# Patient Record
Sex: FEMALE | Race: WHITE | Employment: OTHER | URBAN - METROPOLITAN AREA
[De-identification: names, ages, dates, MRNs, and addresses within clinical notes are randomized per-mention and may not be internally consistent; named-entity substitution may affect disease eponyms.]

---

## 2017-01-12 ENCOUNTER — OFFICE VISIT (OUTPATIENT)
Dept: DERMATOLOGY | Facility: CLINIC | Age: 62
End: 2017-01-12
Payer: COMMERCIAL

## 2017-01-12 VITALS — SYSTOLIC BLOOD PRESSURE: 137 MMHG | HEIGHT: 69 IN | HEART RATE: 77 BPM | DIASTOLIC BLOOD PRESSURE: 80 MMHG

## 2017-01-12 DIAGNOSIS — L82.1 SK (SEBORRHEIC KERATOSIS): ICD-10-CM

## 2017-01-12 DIAGNOSIS — L81.4 LENTIGO: ICD-10-CM

## 2017-01-12 DIAGNOSIS — C44.719 BASAL CELL CARCINOMA OF LEFT LOWER EXTREMITY: Primary | ICD-10-CM

## 2017-01-12 DIAGNOSIS — Z85.828 HISTORY OF SKIN CANCER: ICD-10-CM

## 2017-01-12 PROCEDURE — 17313 MOHS 1 STAGE T/A/L: CPT | Performed by: DERMATOLOGY

## 2017-01-12 PROCEDURE — 11100 ZZHC BIOPSY SKIN/SUBQ/MUC MEM, SINGLE LESION: CPT | Mod: 59 | Performed by: DERMATOLOGY

## 2017-01-12 PROCEDURE — 99213 OFFICE O/P EST LOW 20 MIN: CPT | Mod: 25 | Performed by: DERMATOLOGY

## 2017-01-12 PROCEDURE — 88331 PATH CONSLTJ SURG 1 BLK 1SPC: CPT | Mod: 59 | Performed by: DERMATOLOGY

## 2017-01-12 NOTE — MR AVS SNAPSHOT
After Visit Summary   1/12/2017    Jolynn Haji    MRN: [id number]           Patient Information     Date Of Birth          1955        Visit Information        Provider Department      1/12/2017 1:00 PM Sheldon Rogers MD [facility]        Care Instructions          Wound Care Instructions     FOR SUPERFICIAL WOUNDS     [facility] [phone]    [facility] [phone]                       AFTER 24 HOURS YOU SHOULD REMOVE THE BANDAGE AND BEGIN DAILY DRESSING CHANGES AS FOLLOWS:     1) Remove Dressing.     2) Clean and dry the area with tap water using a Q-tip or sterile gauze pad.     3) Apply Vaseline, Aquaphor, Polysporin ointment or Bacitracin ointment over entire wound.  Do NOT use Neosporin ointment.     4) Cover the wound with a band-aid, or a sterile non-stick gauze pad and micropore paper tape      REPEAT THESE INSTRUCTIONS AT LEAST ONCE A DAY UNTIL THE WOUND HAS COMPLETELY HEALED.    It is an old wives tale that a wound heals better when it is exposed to air and allowed to dry out. The wound will heal faster with a better cosmetic result if it is kept moist with ointment and covered with a bandage.    **Do not let the wound dry out.**      Supplies Needed:      *Cotton tipped applicators (Q-tips)    *Polysporin Ointment or Bacitracin Ointment (NOT NEOSPORIN)    *Band-aids or non-stick gauze pads and micropore paper tape.      PATIENT INFORMATION:    During the healing process you will notice a number of changes. All wounds develop a small halo of redness surrounding the wound.  This means healing is occurring. Severe itching with extensive redness usually indicates sensitivity to the ointment or bandage tape used to dress the wound.  You should call our office if this develops.      Swelling  and/or discoloration around your surgical site is common, particularly when performed around the eye.    All wounds normally drain.  The  larger the wound the more drainage there will be.  After 7-10 days, you will notice the wound beginning to shrink and new skin will begin to grow.  The wound is healed when you can see skin has formed over the entire area.  A healed wound has a healthy, shiny look to the surface and is red to dark pink in color to normalize.  Wounds may take approximately 4-6 weeks to heal.  Larger wounds may take 6-8 weeks.  After the wound is healed you may discontinue dressing changes.    You may experience a sensation of tightness as your wound heals. This is normal and will gradually subside.    Your healed wound may be sensitive to temperature changes. This sensitivity improves with time, but if you re having a lot of discomfort, try to avoid temperature extremes.    Patients frequently experience itching after their wound appears to have healed because of the continue healing under the skin.  Plain Vaseline will help relieve the itching.        POSSIBLE COMPLICATIONS    BLEEDIN. Leave the bandage in place.  2. Use tightly rolled up gauze or a cloth to apply direct pressure over the bandage for 30  minutes.  3. Reapply pressure for an additional 30 minutes if necessary  4. Use additional gauze and tape to maintain pressure once the bleeding has stopped.  5.         Follow-ups after your visit        Who to contact     If you have questions or need follow up information about today's clinic visit or your schedule please contact [facility] directly at [phone].  Normal or non-critical lab and imaging results will be communicated to you by [employer], letter or phone within 4 business days after the clinic has received the results. If you do not hear from us within 7 days, please contact the clinic through [employer] or phone. If you have a critical or abnormal lab result, we will notify you by phone as soon as possible.  Submit refill requests through [employer] [place] or call your pharmacy and they will  "forward the refill request to us. Please allow 3 business days for your refill to be completed.          Additional Information About Your Visit        MyChart Information     [employer] lets you send messages to your doctor, view your test results, renew your prescriptions, schedule appointments and more. To sign up, go to [email or web address] . Click on \"Log in\" on the left side of the screen, which will take you to the Welcome page. Then click on \"Sign up Now\" on the right side of the page.     You will be asked to enter the access code listed below, as well as some personal information. Please follow the directions to create your username and password.     Your access code is: [email or web address]  Expires: 2017  1:20 PM     Your access code will  in 90 days. If you need help or a new code, please call your [place] clinic or [phone].        Care EveryWhere ID     This is your Care EveryWhere ID. This could be used by other organizations to access your [place] medical records  [id number]        Your Vitals Were     Pulse Height Last Period             77 1.753 m (5' 9\") 2001          Blood Pressure from Last 3 Encounters:   17 137/80   16 128/79   16 120/80    Weight from Last 3 Encounters:   16 74.299 kg (163 lb 12.8 oz)   06/11/15 71.668 kg (158 lb)   14 70.931 kg (156 lb 6 oz)              Today, you had the following     No orders found for display       Primary Care Provider Office Phone # Fax #    Helga Ibarra [phone]       [address] [address]        Thank you!     Thank you for choosing [facility]  for your care. Our goal is always to provide you with excellent care. Hearing back from our patients is one way we can continue to improve our services. Please take a few minutes to complete the written survey that you may receive in the mail after your visit " with us. Thank you!             Your Updated Medication List - Protect others around you: Learn how to safely use, store and throw away your medicines at www.disposemymeds.org.          This list is accurate as of: 1/12/17  1:20 PM.  Always use your most recent med list.                   Brand Name Dispense Instructions for use    albuterol 108 (90 BASE) MCG/ACT Inhaler    PROAIR HFA/PROVENTIL HFA/VENTOLIN HFA    1 Inhaler    Inhale 2 puffs into the lungs every 6 hours as needed for shortness of breath / dyspnea       fluticasone 110 MCG/ACT Inhaler    FLOVENT HFA    1 Inhaler    Inhale 2 puffs into the lungs 2 times daily 2 puffs       hydroquinone 4 % Crea     30 g    Apply to face q hs       lisinopril 2.5 MG tablet    PRINIVIL/Zestril    90 tablet    TAKE ONE TABLET BY MOUTH EVERY DAY       loratadine 10 MG capsule     30 capsule    Take 10 mg by mouth daily       metroNIDAZOLE 0.75 % cream    METROCREAM    45 g    APPLY TOPICALLY TWO TIMES A DAY TO FACE

## 2017-01-12 NOTE — PATIENT INSTRUCTIONS
Wound Care Instructions     FOR SUPERFICIAL WOUNDS     [facility] [phone]    [facility] [phone]                       AFTER 24 HOURS YOU SHOULD REMOVE THE BANDAGE AND BEGIN DAILY DRESSING CHANGES AS FOLLOWS:     1) Remove Dressing.     2) Clean and dry the area with tap water using a Q-tip or sterile gauze pad.     3) Apply Vaseline, Aquaphor, Polysporin ointment or Bacitracin ointment over entire wound.  Do NOT use Neosporin ointment.     4) Cover the wound with a band-aid, or a sterile non-stick gauze pad and micropore paper tape      REPEAT THESE INSTRUCTIONS AT LEAST ONCE A DAY UNTIL THE WOUND HAS COMPLETELY HEALED.    It is an old wives tale that a wound heals better when it is exposed to air and allowed to dry out. The wound will heal faster with a better cosmetic result if it is kept moist with ointment and covered with a bandage.    **Do not let the wound dry out.**      Supplies Needed:      *Cotton tipped applicators (Q-tips)    *Polysporin Ointment or Bacitracin Ointment (NOT NEOSPORIN)    *Band-aids or non-stick gauze pads and micropore paper tape.      PATIENT INFORMATION:    During the healing process you will notice a number of changes. All wounds develop a small halo of redness surrounding the wound.  This means healing is occurring. Severe itching with extensive redness usually indicates sensitivity to the ointment or bandage tape used to dress the wound.  You should call our office if this develops.      Swelling  and/or discoloration around your surgical site is common, particularly when performed around the eye.    All wounds normally drain.  The larger the wound the more drainage there will be.  After 7-10 days, you will notice the wound beginning to shrink and new skin will begin to grow.  The wound is healed when you can see skin has formed over the entire area.  A healed wound has a healthy, shiny look to the surface and is red to dark pink in color  to normalize.  Wounds may take approximately 4-6 weeks to heal.  Larger wounds may take 6-8 weeks.  After the wound is healed you may discontinue dressing changes.    You may experience a sensation of tightness as your wound heals. This is normal and will gradually subside.    Your healed wound may be sensitive to temperature changes. This sensitivity improves with time, but if you re having a lot of discomfort, try to avoid temperature extremes.    Patients frequently experience itching after their wound appears to have healed because of the continue healing under the skin.  Plain Vaseline will help relieve the itching.        POSSIBLE COMPLICATIONS    BLEEDIN. Leave the bandage in place.  2. Use tightly rolled up gauze or a cloth to apply direct pressure over the bandage for 30  minutes.  3. Reapply pressure for an additional 30 minutes if necessary  4. Use additional gauze and tape to maintain pressure once the bleeding has stopped.  5.

## 2017-01-12 NOTE — NURSING NOTE
"Initial /80 mmHg  Pulse 77  Ht 1.753 m (5' 9\")  LMP 06/28/2001 Estimated body mass index is 24.18 kg/(m^2) as calculated from the following:    Height as of this encounter: 1.753 m (5' 9\").    Weight as of 4/21/16: 74.299 kg (163 lb 12.8 oz). .      "

## 2017-01-12 NOTE — PROGRESS NOTES
Jolynn Haji is a 61 year old year old female patient here today for f/u hx of non-melanoma skin cancer.  She notes a bleeding spot on left shin.   .  Patient states this has been present for ?.  Patient reports the following symptoms:  bleeding.  Patient reports the following previous treatments none.  Patient reports the following modifying factors none.  Associated symptoms: none.  Patient has no other skin complaints today.  Remainder of the HPI, Meds, PMH, Allergies, FH, and SH was reviewed in chart.    Pertinent Hx:   Non-melanoma skin cancer    Past Medical History   Diagnosis Date     Abnormal glandular Papanicolaou smear of cervix      Hidradenitis      left  groin     Synovial cyst of popliteal space      Hypertension goal BP (blood pressure) < 140/90      Postmenopausal since 2008      OA (osteoarthritis) of knee      moderate     Shingles 2010     left      HSIL (high grade squamous intraepithelial lesion) on Pap smear of cervix 2000     Normal paps from  to /     Abnormal glandular Papanicolaou smear of cervix- ASCUS in 2003     ASC H-      Basal cell carcinoma of leg, right 2016     Basal cell carcinoma        Past Surgical History   Procedure Laterality Date     C nonspecific procedure       Colposcopy        Family History   Problem Relation Age of Onset     Thyroid Disease Mother      Graves disease     HEART DISEASE Father      MV problem with CHF  at 62     CANCER Maternal Uncle      type ??     CANCER Maternal Uncle      liver     DIABETES Daughter      Hypertension Mother      Hypertension Father      DIABETES Mother      type 2     HEART DISEASE Father 62      from MI     Cardiovascular Father      Blood Disease Mother       from leukemia at age 78       Social History     Social History     Marital Status:      Spouse Name: Zach     Number of Children: 2     Years of Education: 16     Occupational History     teacher - first  grade - retired       [employer]     Social History Main Topics     Smoking status: Never Smoker      Smokeless tobacco: Never Used     Alcohol Use: 12.0 oz/week      Comment: rarely      Drug Use: No      Comment: no herbal meds either     Sexual Activity:     Partners: Male     Birth Control/ Protection: Surgical      Comment: -was on DepoProvera since 2000-2/2008 -  had vasectomy     Other Topics Concern      Service No     Blood Transfusions No     Caffeine Concern No     Occupational Exposure No     Hobby Hazards No     Sleep Concern No     Stress Concern No     Weight Concern No     Special Diet No     Back Care No     Exercise Yes     regular      Bike Helmet No     Seat Belt Yes     always     Self-Exams Yes     SBE  encouraged monthly     Parent/Sibling W/ Cabg, Mi Or Angioplasty Before 65f 55m? No     Social History Narrative    calcium - 3 +dairy servings/day and/or calcium supplement daily     flex sig/colonoscopy -referred 11/05 - was postponed by [facility] - will pt another referral  3/14/06, didn't go - referred again 6/27/07 = normal.     sun precautions - discussed    mammogram - yearly     Td booster -1995, 2/14/05     pneumovax -at age 59-65    DEXA -normal 6/2005- recheck this year 2008 - referral given 2/2008    stool hemoccults - every year after age 40    ASA- start 81mg asa qday    mulvitamin - encouraged    Last menses about 7/08.         no hot flashes- 2005 - was on Depoprovera 2000- 2/2008        Outpatient Encounter Prescriptions as of 1/12/2017   Medication Sig Dispense Refill     lisinopril (PRINIVIL,ZESTRIL) 2.5 MG tablet TAKE ONE TABLET BY MOUTH EVERY DAY 90 tablet 1     hydroquinone 4 % CREA Apply to face q hs 30 g 0     loratadine 10 MG capsule Take 10 mg by mouth daily 30 capsule      albuterol (PROAIR HFA, PROVENTIL HFA, VENTOLIN HFA) 108 (90 BASE) MCG/ACT inhaler Inhale 2 puffs into the lungs every 6 hours as  "needed for shortness of breath / dyspnea 1 Inhaler 5     fluticasone (FLOVENT HFA) 110 MCG/ACT inhaler Inhale 2 puffs into the lungs 2 times daily 2 puffs 1 Inhaler 12     metroNIDAZOLE (METROCREAM) 0.75 % cream APPLY TOPICALLY TWO TIMES A DAY TO FACE 45 g 5     No facility-administered encounter medications on file as of 1/12/2017.             Review Of Systems  Skin: As above  Eyes: negative  Ears/Nose/Throat: negative  Respiratory: No shortness of breath, dyspnea on exertion, cough, or hemoptysis  Cardiovascular: negative  Gastrointestinal: negative  Genitourinary: negative  Musculoskeletal: negative  Neurologic: negative  Psychiatric: negative  Hematologic/Lymphatic/Immunologic: negative  Endocrine: negative      O:   NAD, WDWN, Alert & Oriented, Mood & Affect wnl, Vitals stable   Here today alone   /80 mmHg  Pulse 77  Ht 1.753 m (5' 9\")  LMP 06/28/2001   General appearance normal   Vitals stable   Alert, oriented and in no acute distress      Following lymph nodes palpated: Occipital, Cervical, Supraclavicular no lad   Stuck on papules and brown macules on trunk and ext    L shin with 1cm pink pearly papule         The remainder of the full exam was unremarkable; the following areas were examined:  conjunctiva/lids, oral mucosa, neck, peripheral vascular system, abdomen, lymph nodes, digits/nails, eccrine and apocrine glands, scalp/hair, face, neck, chest, abdomen, buttocks, back, RUE, LUE, RLE, LLE       Eyes: Conjunctivae/lids:Normal     ENT: Lips, buccal mucosa, tongue: normal    MSK:Normal    Cardiovascular: peripheral edema none    Pulm: Breathing Normal    Lymph Nodes: No Head and Neck Lymphadenopathy     Neuro/Psych: Orientation:Normal; Mood/Affect:Normal      MICRO:   L shin:Orthokeratosis of epidermis with a proliferation of nests of basaloid cells, with peripheral palisading and a haphazard arrangement in the center extending into the dermis, forming nodules.  The tumor cells have " hyperchromatic nuclei. Poor cytoplasm and intercellular bridging.    A/P:  1. L shin r/o basal cell carcinoma   TANGENTIAL BIOPSY IN HOUSE:  After consent, anesthesia with LEC and prep, tangential excision performed and dx above confirmed with frozen section histology.  No complications and routine wound care.  Patient told result basal cell carcinoma     2. Seborrheic keratosis, lentigo, hx of basal cell carcinoma   .      BENIGN LESIONS DISCUSSED WITH PATIENT:  I discussed the specifics of tumor, prognosis, and genetics of benign lesions.  I explained that treatment of these lesions would be purely cosmetic and not medically neccessary.  I discussed with patient different removal options including excision, cautery and /or laser.      Nature and genetics of benign skin lesions dicussed with patient.  Signs and Symptoms of skin cancer discussed with patient.  Patient encouraged to perform monthly skin exams.  UV precautions reviewed with patient.  Skin care regimen reviewed with patient: Eliminate harsh soaps, i.e. Dial, zest, irsih spring; Mild soaps such as Cetaphil or Dove sensitive skin, avoid hot or cold showers, aggressive use of emollients including vanicream, cetaphil or cerave discussed with patient.    Risks of non-melanoma skin cancer discussed with patient   Return to clinic 6 months      PROCEDURE NOTe  L shin basal cell carcinoma   MOHS:   Location    After PGACAC discussed with patient, decision for Mohs surgery was made. Indication for Mohs was Location. Patient confirmed the site with Dr. Rogers.  After anesthesia with LEC, the tumor was excised using standard Mohs technique in 1 stages(s).  CLEAR MARGINS OBTAINED and Final defect size was 1.3 cm.       REPAIR SECOND INTENT: We discussed the options for wound management in full with the patient including risks/benefits/possible outcomes. Decision made to allow the wound to heal by second intention. EBL minimal; complications none; wound care  routine.  The patient was discharged in good condition and will return in one month or prn for wound evaluation.

## 2017-03-09 ENCOUNTER — OFFICE VISIT (OUTPATIENT)
Dept: FAMILY MEDICINE | Facility: CLINIC | Age: 62
End: 2017-03-09
Payer: COMMERCIAL

## 2017-03-09 VITALS
HEART RATE: 79 BPM | TEMPERATURE: 98.1 F | SYSTOLIC BLOOD PRESSURE: 132 MMHG | HEIGHT: 69 IN | BODY MASS INDEX: 24.26 KG/M2 | DIASTOLIC BLOOD PRESSURE: 80 MMHG | WEIGHT: 163.8 LBS | OXYGEN SATURATION: 99 %

## 2017-03-09 DIAGNOSIS — J20.9 ACUTE BRONCHITIS, UNSPECIFIED ORGANISM: ICD-10-CM

## 2017-03-09 DIAGNOSIS — J01.01 ACUTE RECURRENT MAXILLARY SINUSITIS: Primary | ICD-10-CM

## 2017-03-09 DIAGNOSIS — I10 ESSENTIAL HYPERTENSION WITH GOAL BLOOD PRESSURE LESS THAN 140/90: ICD-10-CM

## 2017-03-09 DIAGNOSIS — H66.001 ACUTE SUPPURATIVE OTITIS MEDIA OF RIGHT EAR WITHOUT SPONTANEOUS RUPTURE OF TYMPANIC MEMBRANE, RECURRENCE NOT SPECIFIED: ICD-10-CM

## 2017-03-09 PROCEDURE — 99214 OFFICE O/P EST MOD 30 MIN: CPT | Performed by: FAMILY MEDICINE

## 2017-03-09 RX ORDER — LISINOPRIL 2.5 MG/1
2.5 TABLET ORAL DAILY
Qty: 90 TABLET | Refills: 1 | Status: CANCELLED | OUTPATIENT
Start: 2017-03-09

## 2017-03-09 RX ORDER — CEFPROZIL 500 MG/1
500 TABLET, FILM COATED ORAL 2 TIMES DAILY
Qty: 20 TABLET | Refills: 0 | Status: SHIPPED | OUTPATIENT
Start: 2017-03-09 | End: 2017-03-22

## 2017-03-09 RX ORDER — LISINOPRIL 5 MG/1
5 TABLET ORAL DAILY
Qty: 30 TABLET | Refills: 1 | Status: SHIPPED | OUTPATIENT
Start: 2017-03-09 | End: 2017-05-12

## 2017-03-09 RX ORDER — FLUTICASONE PROPIONATE 50 MCG
1-2 SPRAY, SUSPENSION (ML) NASAL DAILY
Qty: 1 BOTTLE | Refills: 11 | Status: SHIPPED | OUTPATIENT
Start: 2017-03-09 | End: 2019-12-11

## 2017-03-09 NOTE — NURSING NOTE
"Chief Complaint   Patient presents with     URI       Initial /86 (BP Location: Right arm, Patient Position: Chair, Cuff Size: Adult Regular)  Pulse 79  Temp 98.1  F (36.7  C) (Oral)  Ht 5' 9\" (1.753 m)  Wt 163 lb 12.8 oz (74.3 kg)  LMP 06/28/2001  SpO2 99%  BMI 24.19 kg/m2 Estimated body mass index is 24.19 kg/(m^2) as calculated from the following:    Height as of this encounter: 5' 9\" (1.753 m).    Weight as of this encounter: 163 lb 12.8 oz (74.3 kg).  Medication Reconciliation: complete   Liane Escobedo CMA    "

## 2017-03-09 NOTE — PROGRESS NOTES
SUBJECTIVE:                                                    Jolynn Haji is a 61 year old female who presents to clinic today for the following health issues:    Acute Illness   Acute illness concerns: URI - started with bad cold - stuffy nose, then cough.   Onset: x3 weeks    Fever: no    Chills/Sweats: YES- chills    Headache (location?): YES- frontal    Sinus Pressure:YES- tender and facial pain    Conjunctivitis:  no    Ear Pain: YES: right - pressure and pain- started yesterday.     Rhinorrhea: YES- green    Congestion: YES- chest and head    Sore Throat: YES- when sx first started      Cough: YES-productive of yellow sputum, productive of green sputum    Wheeze: no    Decreased Appetite: no    Nausea: no    Vomiting: no    Diarrhea:  no    Dysuria/Freq.: no    Fatigue/Achiness: YES- both    Sick/Strep Exposure: no     Therapies Tried and outcome: Mucinex - minimal relief, Robtiussin DM - helps with sleep, Tylenol, and Advil - helps with ear pain.        Problem list and histories reviewed & adjusted, as indicated.  Additional history: as documented    Reviewed and updated as needed this visit by clinical staff  Tobacco  Allergies  Meds  Med Hx  Surg Hx  Fam Hx  Soc Hx      Reviewed and updated as needed this visit by Provider       Patient Active Problem List   Diagnosis     Hypertension goal BP (blood pressure) < 140/90     Hidradenitis     CELIAC DISEASE- family hx of      Localized osteoarthritis of hand     Synovial cyst of popliteal space     DERMATOPHYTOSIS OF NAIL- toenails      Lipidosis     Osteopenia     Postmenopausal     NUMBNESS AND TINGLING OF FOOT - bilateral -mild      CARDIOVASCULAR SCREENING; LDL GOAL LESS THAN 160     Numbness of feet- distal feet R>L      Advanced directives, counseling/discussion     Stress incontinence, female - mild     Cough     Acute bronchitis- recurrent- ? related to mold in school building- pt has since retired and no further bronchitis     Rosacea      "History of migraine headaches- assoc. with nausea/vomiting - resolved around menopause - were  premenstrual      Dupuytren's disease of palm - right 4th digit flexor tendon     Sun-damaged skin     Seasonal allergic rhinitis     Basal cell carcinoma of leg, right       Current Outpatient Prescriptions   Medication Sig Dispense Refill     lisinopril (PRINIVIL,ZESTRIL) 2.5 MG tablet TAKE ONE TABLET BY MOUTH EVERY DAY 90 tablet 1     loratadine 10 MG capsule Take 10 mg by mouth daily 30 capsule      albuterol (PROAIR HFA, PROVENTIL HFA, VENTOLIN HFA) 108 (90 BASE) MCG/ACT inhaler Inhale 2 puffs into the lungs every 6 hours as needed for shortness of breath / dyspnea 1 Inhaler 5     fluticasone (FLOVENT HFA) 110 MCG/ACT inhaler Inhale 2 puffs into the lungs 2 times daily 2 puffs 1 Inhaler 12     metroNIDAZOLE (METROCREAM) 0.75 % cream APPLY TOPICALLY TWO TIMES A DAY TO FACE 45 g 5          Allergies   Allergen Reactions     Augmentin      itching, rash     Fosamax Diarrhea     Diarrhea, stomach cramps, jaw pain           ROS:   ROS: 12 point ROS neg other than the symptoms noted above.  Has noticed bp up recently.   BP Readings from Last 6 Encounters:   03/09/17 150/86   01/12/17 137/80   07/14/16 128/79   04/21/16 120/80   02/08/16 118/72   11/05/15 108/64   Pt hasn't been taking  Any decongestants in the last 2 weeks at all .       OBJECTIVE:                                                    /80  Pulse 79  Temp 98.1  F (36.7  C) (Oral)  Ht 5' 9\" (1.753 m)  Wt 163 lb 12.8 oz (74.3 kg)  LMP 06/28/2001  SpO2 99%  BMI 24.19 kg/m2  Body mass index is 24.19 kg/(m^2).   Initial bp = 152/86.   GENERAL: healthy, alert, well nourished, well hydrated, no distress  HENT: ear canals- normal; left TM is- normal; the right TM is dull, red, and bulging; Nose-congested; with bilateral maxillary sinus tenderness to palpation;  Mouth- no ulcers, no lesions  NECK: no tenderness, no adenopathy, no asymmetry, no masses, no " stiffness; thyroid- normal to palpation  RESP: lungs clear to auscultation - no rales, no rhonchi, no wheezes,  with deep breathing, but with cough has coarse moist rhonchi and wheezes  at the mid posterior fields that radiate throughout the lungs and don't clear with continued coughing.     CV: regular rates and rhythm, normal S1 S2, no S3 or S4 and no murmur, no click or rub -  ABDOMEN: soft, no tenderness, no  hepatosplenomegaly, no masses, normal bowel sounds  MS: extremities- no gross deformities noted, no edema    Diagnostic test results:  none      ASSESSMENT/PLAN:                                                        ICD-10-CM    1. Acute recurrent maxillary sinusitis J01.01 cefPROZIL (CEFZIL) 500 MG tablet     fluticasone (FLONASE) 50 MCG/ACT spray   2. Acute bronchitis, unspecified organism J20.9 cefPROZIL (CEFZIL) 500 MG tablet   3. Essential hypertension with goal blood pressure less than 140/90 - increased from previous- not controlled I10 lisinopril (PRINIVIL/ZESTRIL) 5 MG tablet   4. Acute suppurative otitis media of right ear without spontaneous rupture of tympanic membrane, recurrence not specified H66.001 antipyrine-benzocaine (AURODEX) 54-14 MG/ML SOLN otic solution     Will increase lisinopril to 5mg daily - take at night. Recheck bp with me again in 1month.   Please, call or return to clinic or go to the ER immediately if signs or symptoms worsen or fail to improve as anticipated.    See Patient Instructions.          Helga Ibarra MD    [facility] ([facility]]- [place]

## 2017-03-09 NOTE — PATIENT INSTRUCTIONS
Increase your lisinopril to 5mg daily and recheck bp with me next month as scheduled.                     Sinusitis           What is sinusitis?   Sinusitis is swollen, infected linings of the sinuses. The sinuses are hollow spaces in the bones of your face and skull. They connect with the nose through small openings. Like the nose, their linings make mucus.   How does it occur?   Sinusitis occurs when the sinus linings become infected. The passageways from the sinuses to the nose are very narrow. Swelling and mucus may block the passageways. This leads to pressure changes in the sinuses that can be painful.   A number of things can cause swelling and sinusitis. Most often it's allergens (things that cause allergies, like pollen and mold) and viruses, such as viruses that cause the common cold. Whether the cause is allergies or a virus, the sinus linings can swell. When swelling causes the sinus passageway to swell shut, bacteria, viruses, and even fungus can be trapped in the sinuses and cause a sinus infection.   If your nasal bones have been injured or are deformed, causing partial blockage of the sinus openings, you are more likely to get sinusitis.   What are the symptoms?   Symptoms include:   feeling of fullness or pressure in your head   a headache that is most painful when you first wake up in the morning or when you bend your head down or forward   pain above or below your eyes   aching in the upper jaw and teeth   runny or stuffy nose   cough, especially at night   fluid draining down the back of your throat (postnasal drainage)   sore throat in the morning or evening.   How is it diagnosed?   Your healthcare provider will ask about your symptoms and will examine you. You may have an X-ray to look for swelling, fluid, or small benign growths (polyps) in the sinuses.   How is it treated?   Decongestants may help. They may be nonprescription or prescription. They are available as liquids, pills, and nose  sprays.   Your healthcare provider may prescribe an antibiotic. In some cases you may need to take decongestants and antibiotics for several weeks.   You may need nonprescription medicine for pain, such as acetaminophen or ibuprofen. Check with your healthcare provider before you give any medicine that contains aspirin or salicylates to a child or teen. This includes medicines like baby aspirin, some cold medicines, and Pepto Bismol. Children and teens who take aspirin are at risk for a serious illness called Reye's syndrome. Ibuprofen is an NSAID. Nonsteroidal anti-inflammatory medicines (NSAIDs) may cause stomach bleeding and other problems. These risks increase with age. Read the label and take as directed. Unless recommended by your healthcare provider, do not take NSAIDs for more than 10 days for any reason.   If you have chronic or repeated sinus infections, allergies may be the cause. Your healthcare provider may prescribe antihistamine tablets or prescription nasal sprays (steroids or cromolyn) to treat the allergies.   If you have chronic, severe sinusitis that does not respond to treatment with medicines, surgery may be done. The surgeon can create an extra or enlarged passageway in the wall of the sinus cavity. This allows the sinuses to drain more easily through the nasal passages. This should help them stay free of infection.   How long will the effects last?   Symptoms may get better gradually over 3 to 10 days. Depending on what caused the sinusitis and how severe it is, it may last for days or weeks. The symptoms may come back if you do not finish all of your antibiotic.   How can I take care of myself?   Follow your healthcare provider's instructions.   If you are taking an antibiotic, take all of it as directed by your provider. If you stop taking the medicine when your symptoms are gone but before you have taken all of the medicine, symptoms may come back.   Avoid tobacco smoke.   If you have  allergies, take care to avoid the things you are allergic to, such as animal dander.   Add moisture to the air with a humidifier or a vaporizer, unless you have mold allergy (mold may grow in your vaporizer).   Inhale steam from a basin of hot water or shower to help open your sinuses and relieve pain.   Use saline nasal sprays to help wash out nasal passages and clear some mucus from the airways.   Use decongestants as directed on the label or by your provider.   If you are using a nonprescription nasal-spray decongestant, generally you should not use it for more than 3 days. After 3 days it may cause your symptoms to get worse. Ask your healthcare provider if it is OK for you to use a nasal spray decongestant longer than this.   Get plenty of rest.   Drink more fluids to keep the mucus as thin as possible so your sinuses can drain more easily.   Put warm compresses on painful areas.   Take antibiotics as prescribed. Use all of the medicine, even after you feel better. Some sinus infections require 2 to 4 weeks of antibiotic treatment.   See your healthcare provider if the pain lasts for several days or gets worse.   If the sinus areas above or below your eyes are swollen or bulging, see your healthcare provider right away. This symptom may mean that the infection is spreading. A spreading infection can affect other parts of your body--even the brain--and needs to be treated promptly.   How can I help prevent sinusitis?   Treat your colds and allergies promptly. Use decongestants as soon as you start having symptoms.   Do not smoke and stay away from secondhand smoke.   Drink lots of fluids to keep the mucus thin.   Humidify your home if the air is particularly dry.   If you have sinus infections often, consider having allergy tests.   If sinusitis continues to be a problem despite treatment, you might need an exam by an ear, nose, and throat doctor (called an ENT or otolaryngologist). The specialist will check for  polyps or a deformed bone that may be blocking your sinuses.     Published by "[employer]  This content is reviewed periodically and is subject to change as new health information becomes available. The information is intended to inform and educate and is not a replacement for medical evaluation, advice, diagnosis or treatment by a healthcare professional.   Developed by "[employer]   ? 2010 [facility] and/or its affiliates. All Rights Reserved.   Copyright   [employer] 2011  Adult Health Advisor                           Acute Bronchitis                  What is acute bronchitis?   Bronchitis is an infection of the air passages--that is, the tubes that connect the windpipe to the lungs. It causes swelling and irritation of the airways. With acute bronchitis you usually have a cough that produces phlegm and pain behind the breastbone when you breathe deeply or cough.   How does it occur?   Bronchitis often occurs with viral infections of the respiratory tract, such as colds and flu. Bronchitis may also be caused by bacterial infections. It may occur with childhood illnesses such as measles and whooping cough.   Attacks are most frequent during the winter or when the level of air pollution is high.   Infants, young children, older adults, smokers, and people with heart disease or lung disease (including asthma and allergies) are most likely to get acute bronchitis.   What are the symptoms?   Symptoms may include:   a deep cough that produces yellowish or greenish phlegm   pain behind the breastbone when you breathe deeply or cough   wheezing   feeling short of breath   fever   chills   headache   sore muscles.   How is it diagnosed?   Your healthcare provider will ask about your symptoms and examine you. You may have tests, such as:   a test of phlegm to look for bacteria   chest X-ray   blood tests.   How is it treated?   Acute bronchitis often does not require medical treatment. Resting at home  and drinking plenty of fluids to keep the mucus loose may be all you need to do to get better in a few days. If your symptoms are severe or you have other health problems (such as heart or lung disease or diabetes), you may need to take antibiotics.   How long will the effects last?   Most of the time acute bronchitis clears up in a few days. Your cough may slowly get better in 1 to 2 weeks.   It may take you longer to recover if:   You are a smoker.   You live in an area where air pollution is a problem.   You have a heart or lung disease.   You have any other continuing health problems.   How can I take care of myself?   You can help yourself by:   following the full treatment your healthcare provider recommends   using a vaporizer, humidifier, or steam from hot water to add moisture to the air   drinking plenty of liquids   taking cough medicine if recommended by your healthcare provider   resting in bed   taking aspirin or acetaminophen to reduce fever and relieve headache and muscle pain (Check with your healthcare provider before you give any medicine that contains aspirin or salicylates to a child or teen. This includes medicines like baby aspirin, some cold medicines, and Pepto Bismol. Children and teens who take aspirin are at risk for a serious illness called Reye's syndrome.)   eating healthy meals.   Call your healthcare provider if:   You have trouble breathing.   You have a fever of 101.5?F (38.6?C) or higher.   You cough up blood.   Your symptoms are getting worse instead of better.   You don't start to feel better after 3 days of treatment.   You have any symptoms that concern you.   How can I help prevent acute bronchitis?   To reduce your risk of getting a respiratory infection:   Do not smoke.   Wash your hands often, especially when you are around people with colds (upper respiratory infections).   If you have asthma or allergies, keep your symptoms under good control.   Get regular exercise.    Eat healthy foods.       Published by [employer].  This content is reviewed periodically and is subject to change as new health information becomes available. The information is intended to inform and educate and is not a replacement for medical evaluation, advice, diagnosis or treatment by a healthcare professional.   Developed by [employer].   ? 2010 [facility] and/or its affiliates. All Rights Reserved.   Copyright   [employer] 2011                   Thank you for choosing [facility]  for your Health Care. It was a pleasure seeing you at your visit today. Please contact us with any questions or concerns you may have.                   Helga Ibarra MD                                  To reach your [facility] care team after hours call:   [phone]    Our clinic hours are:     Monday- 7:30 am - 7:00 pm                             Tuesday through Friday- 7:30 am - 5:00 pm                                        Saturday- 8:00 am - 12:00 pm                  Phone:  [phone]    Our pharmacy hours are:     Monday  8:00 am to 7:00 pm      Tuesday through Friday 8:00am to 6:00pm                        Saturday - 9:00 am to 1:00 pm      Sunday : Closed.              Phone:  [phone]      There is also information available at our web site:  [email or web address]    If your provider ordered any lab tests and you do not receive the results within 10 business days, please call the clinic.    If you need a medication refill please contact your pharmacy.  Please allow 2 business days for your refill to be completed.    Our clinic offers telephone visits and e visits.  Please ask one of your team members to explain more.      Use [employer] (secure email communication and access to your chart) to send your primary care provider a message or make an appointment. Ask someone on your Team how to sign up for [employer].

## 2017-03-09 NOTE — MR AVS SNAPSHOT
After Visit Summary   3/9/2017    Jolynn Haji    MRN: [id number]           Patient Information     Date Of Birth          1955        Visit Information        Provider Department      3/9/2017 9:30 AM Helga Ibarra MD [facility] [place]        Today's Diagnoses     Acute recurrent maxillary sinusitis    -  1    Essential hypertension with goal blood pressure less than 140/90 - increased from previous- not controlled        Acute bronchitis, unspecified organism          Care Instructions    Increase your lisinopril to 5mg daily and recheck bp with me next month as scheduled.                     Sinusitis           What is sinusitis?   Sinusitis is swollen, infected linings of the sinuses. The sinuses are hollow spaces in the bones of your face and skull. They connect with the nose through small openings. Like the nose, their linings make mucus.   How does it occur?   Sinusitis occurs when the sinus linings become infected. The passageways from the sinuses to the nose are very narrow. Swelling and mucus may block the passageways. This leads to pressure changes in the sinuses that can be painful.   A number of things can cause swelling and sinusitis. Most often it's allergens (things that cause allergies, like pollen and mold) and viruses, such as viruses that cause the common cold. Whether the cause is allergies or a virus, the sinus linings can swell. When swelling causes the sinus passageway to swell shut, bacteria, viruses, and even fungus can be trapped in the sinuses and cause a sinus infection.   If your nasal bones have been injured or are deformed, causing partial blockage of the sinus openings, you are more likely to get sinusitis.   What are the symptoms?   Symptoms include:   feeling of fullness or pressure in your head   a headache that is most painful when you first wake up in the morning or when you bend your head down or forward   pain above or below your eyes    aching in the upper jaw and teeth   runny or stuffy nose   cough, especially at night   fluid draining down the back of your throat (postnasal drainage)   sore throat in the morning or evening.   How is it diagnosed?   Your healthcare provider will ask about your symptoms and will examine you. You may have an X-ray to look for swelling, fluid, or small benign growths (polyps) in the sinuses.   How is it treated?   Decongestants may help. They may be nonprescription or prescription. They are available as liquids, pills, and nose sprays.   Your healthcare provider may prescribe an antibiotic. In some cases you may need to take decongestants and antibiotics for several weeks.   You may need nonprescription medicine for pain, such as acetaminophen or ibuprofen. Check with your healthcare provider before you give any medicine that contains aspirin or salicylates to a child or teen. This includes medicines like baby aspirin, some cold medicines, and Pepto Bismol. Children and teens who take aspirin are at risk for a serious illness called Reye's syndrome. Ibuprofen is an NSAID. Nonsteroidal anti-inflammatory medicines (NSAIDs) may cause stomach bleeding and other problems. These risks increase with age. Read the label and take as directed. Unless recommended by your healthcare provider, do not take NSAIDs for more than 10 days for any reason.   If you have chronic or repeated sinus infections, allergies may be the cause. Your healthcare provider may prescribe antihistamine tablets or prescription nasal sprays (steroids or cromolyn) to treat the allergies.   If you have chronic, severe sinusitis that does not respond to treatment with medicines, surgery may be done. The surgeon can create an extra or enlarged passageway in the wall of the sinus cavity. This allows the sinuses to drain more easily through the nasal passages. This should help them stay free of infection.   How long will the effects last?   Symptoms may get  better gradually over 3 to 10 days. Depending on what caused the sinusitis and how severe it is, it may last for days or weeks. The symptoms may come back if you do not finish all of your antibiotic.   How can I take care of myself?   Follow your healthcare provider's instructions.   If you are taking an antibiotic, take all of it as directed by your provider. If you stop taking the medicine when your symptoms are gone but before you have taken all of the medicine, symptoms may come back.   Avoid tobacco smoke.   If you have allergies, take care to avoid the things you are allergic to, such as animal dander.   Add moisture to the air with a humidifier or a vaporizer, unless you have mold allergy (mold may grow in your vaporizer).   Inhale steam from a basin of hot water or shower to help open your sinuses and relieve pain.   Use saline nasal sprays to help wash out nasal passages and clear some mucus from the airways.   Use decongestants as directed on the label or by your provider.   If you are using a nonprescription nasal-spray decongestant, generally you should not use it for more than 3 days. After 3 days it may cause your symptoms to get worse. Ask your healthcare provider if it is OK for you to use a nasal spray decongestant longer than this.   Get plenty of rest.   Drink more fluids to keep the mucus as thin as possible so your sinuses can drain more easily.   Put warm compresses on painful areas.   Take antibiotics as prescribed. Use all of the medicine, even after you feel better. Some sinus infections require 2 to 4 weeks of antibiotic treatment.   See your healthcare provider if the pain lasts for several days or gets worse.   If the sinus areas above or below your eyes are swollen or bulging, see your healthcare provider right away. This symptom may mean that the infection is spreading. A spreading infection can affect other parts of your body--even the brain--and needs to be treated promptly.   How can  I help prevent sinusitis?   Treat your colds and allergies promptly. Use decongestants as soon as you start having symptoms.   Do not smoke and stay away from secondhand smoke.   Drink lots of fluids to keep the mucus thin.   Humidify your home if the air is particularly dry.   If you have sinus infections often, consider having allergy tests.   If sinusitis continues to be a problem despite treatment, you might need an exam by an ear, nose, and throat doctor (called an ENT or otolaryngologist). The specialist will check for polyps or a deformed bone that may be blocking your sinuses.     Published by [employer].  This content is reviewed periodically and is subject to change as new health information becomes available. The information is intended to inform and educate and is not a replacement for medical evaluation, advice, diagnosis or treatment by a healthcare professional.   Developed by [employer].   ? 2010 [employer] and/or its affiliates. All Rights Reserved.   Copyright   [employer] 2011  Adult Health Advisor                           Acute Bronchitis                  What is acute bronchitis?   Bronchitis is an infection of the air passages--that is, the tubes that connect the windpipe to the lungs. It causes swelling and irritation of the airways. With acute bronchitis you usually have a cough that produces phlegm and pain behind the breastbone when you breathe deeply or cough.   How does it occur?   Bronchitis often occurs with viral infections of the respiratory tract, such as colds and flu. Bronchitis may also be caused by bacterial infections. It may occur with childhood illnesses such as measles and whooping cough.   Attacks are most frequent during the winter or when the level of air pollution is high.   Infants, young children, older adults, smokers, and people with heart disease or lung disease (including asthma and allergies) are most likely to get acute bronchitis.   What are  the symptoms?   Symptoms may include:   a deep cough that produces yellowish or greenish phlegm   pain behind the breastbone when you breathe deeply or cough   wheezing   feeling short of breath   fever   chills   headache   sore muscles.   How is it diagnosed?   Your healthcare provider will ask about your symptoms and examine you. You may have tests, such as:   a test of phlegm to look for bacteria   chest X-ray   blood tests.   How is it treated?   Acute bronchitis often does not require medical treatment. Resting at home and drinking plenty of fluids to keep the mucus loose may be all you need to do to get better in a few days. If your symptoms are severe or you have other health problems (such as heart or lung disease or diabetes), you may need to take antibiotics.   How long will the effects last?   Most of the time acute bronchitis clears up in a few days. Your cough may slowly get better in 1 to 2 weeks.   It may take you longer to recover if:   You are a smoker.   You live in an area where air pollution is a problem.   You have a heart or lung disease.   You have any other continuing health problems.   How can I take care of myself?   You can help yourself by:   following the full treatment your healthcare provider recommends   using a vaporizer, humidifier, or steam from hot water to add moisture to the air   drinking plenty of liquids   taking cough medicine if recommended by your healthcare provider   resting in bed   taking aspirin or acetaminophen to reduce fever and relieve headache and muscle pain (Check with your healthcare provider before you give any medicine that contains aspirin or salicylates to a child or teen. This includes medicines like baby aspirin, some cold medicines, and Pepto Bismol. Children and teens who take aspirin are at risk for a serious illness called Reye's syndrome.)   eating healthy meals.   Call your healthcare provider if:   You have trouble breathing.   You have a fever  of 101.5?F (38.6?C) or higher.   You cough up blood.   Your symptoms are getting worse instead of better.   You don't start to feel better after 3 days of treatment.   You have any symptoms that concern you.   How can I help prevent acute bronchitis?   To reduce your risk of getting a respiratory infection:   Do not smoke.   Wash your hands often, especially when you are around people with colds (upper respiratory infections).   If you have asthma or allergies, keep your symptoms under good control.   Get regular exercise.   Eat healthy foods.       Published by [employer].  This content is reviewed periodically and is subject to change as new health information becomes available. The information is intended to inform and educate and is not a replacement for medical evaluation, advice, diagnosis or treatment by a healthcare professional.   Developed by [employer].   ? 2010 [employer] and/or its affiliates. All Rights Reserved.   Copyright   Clinical Reference Systems 2011                   Thank you for choosing [facility]  for your Health Care. It was a pleasure seeing you at your visit today. Please contact us with any questions or concerns you may have.                   Helga Ibarra MD                                  To reach your [facility] care team after hours call:   [phone]    Our clinic hours are:     Monday- 7:30 am - 7:00 pm                             Tuesday through Friday- 7:30 am - 5:00 pm                                        Saturday- 8:00 am - 12:00 pm                  Phone:  [phone]    Our pharmacy hours are:     Monday  8:00 am to 7:00 pm      Tuesday through Friday 8:00am to 6:00pm                        Saturday - 9:00 am to 1:00 pm      Sunday : Closed.              Phone:  [phone]      There is also information available at our web site:  [email or web address]    If your provider ordered any lab tests and you do not receive the  "results within 10 business days, please call the clinic.    If you need a medication refill please contact your pharmacy.  Please allow 2 business days for your refill to be completed.    Our clinic offers telephone visits and e visits.  Please ask one of your team members to explain more.      Use [employer] (secure email communication and access to your chart) to send your primary care provider a message or make an appointment. Ask someone on your Team how to sign up for [employer].                     Follow-ups after your visit        Your next 10 appointments already scheduled     Apr 27, 2017  8:15 AM CDT   [facility] with Helga [facility]barra [facility]   [facility]    [address] [place] [id number] [phone]              Who to contact     If you have questions or need follow up information about today's clinic visit or your schedule please contact [facility] directly at [phone].  Normal or non-critical lab and imaging results will be communicated to you by MyChart, letter or phone within 4 business days after the clinic has received the results. If you do not hear from us within 7 days, please contact the clinic through [employer] or phone. If you have a critical or abnormal lab result, we will notify you by phone as soon as possible.  Submit refill requests through [employer] or call your pharmacy and they will forward the refill request to us. Please allow 3 business days for your refill to be completed.          Additional Information About Your Visit        [employer] Information     [employer] lets you send messages to your doctor, view your test results, renew your prescriptions, schedule appointments and more. To sign up, go to [email or web address] . Click on \"Log in\" on the left side of the screen, which will take you to the Welcome page. Then click on \"Sign up Now\" on the right side of the page.     You will be asked to " "enter the access code listed below, as well as some personal information. Please follow the directions to create your username and password.     Your access code is: [email or web address]  Expires: 2017  1:20 PM     Your access code will  in 90 days. If you need help or a new code, please call your [facility] clinic or [phone].        Care EveryWhere ID     This is your Care EveryWhere ID. This could be used by other organizations to access your [facility] medical records  [id number]        Your Vitals Were     Pulse Temperature Height Last Period Pulse Oximetry BMI (Body Mass Index)    79 98.1  F (36.7  C) (Oral) 5' 9\" (1.753 m) 2001 99% 24.19 kg/m2       Blood Pressure from Last 3 Encounters:   17 132/80   17 137/80   16 128/79    Weight from Last 3 Encounters:   17 163 lb 12.8 oz (74.3 kg)   16 163 lb 12.8 oz (74.3 kg)   06/11/15 158 lb (71.7 kg)              Today, you had the following     No orders found for display         Today's Medication Changes          These changes are accurate as of: 3/9/17 10:14 AM.  If you have any questions, ask your nurse or doctor.               Start taking these medicines.        Dose/Directions    cefPROZIL 500 MG tablet   Commonly known as:  CEFZIL   Used for:  Acute recurrent maxillary sinusitis, Acute bronchitis, unspecified organism   Started by:  Helga Ibarra MD        Dose:  500 mg   Take 1 tablet (500 mg) by mouth 2 times daily   Quantity:  20 tablet   Refills:  0       fluticasone 50 MCG/ACT spray   Commonly known as:  FLONASE   Used for:  Acute recurrent maxillary sinusitis   Started by:  Helga Ibarra MD        Dose:  1-2 spray   Spray 1-2 sprays into both nostrils daily   Quantity:  1 Bottle   Refills:  11         These medicines have changed or have updated prescriptions.        Dose/Directions    * lisinopril 2.5 MG tablet   Commonly known as:  PRINIVIL/Zestril   This may have changed:  Another " medication with the same name was added. Make sure you understand how and when to take each.   Used for:  Hypertension goal BP (blood pressure) < 140/90   Changed by:  Helga Ibarra MD        TAKE ONE TABLET BY MOUTH EVERY DAY   Quantity:  90 tablet   Refills:  1       * lisinopril 5 MG tablet   Commonly known as:  PRINIVIL/ZESTRIL   This may have changed:  You were already taking a medication with the same name, and this prescription was added. Make sure you understand how and when to take each.   Used for:  Essential hypertension with goal blood pressure less than 140/90   Changed by:  Helga Ibarra MD        Dose:  5 mg   Take 1 tablet (5 mg) by mouth daily   Quantity:  30 tablet   Refills:  1       * Notice:  This list has 2 medication(s) that are the same as other medications prescribed for you. Read the directions carefully, and ask your doctor or other care provider to review them with you.         Where to get your medicines      These medications were sent to [facility] [place] [address]     Phone:  [phone]     cefPROZIL 500 MG tablet    fluticasone 50 MCG/ACT spray    lisinopril 5 MG tablet                Primary Care Provider Office Phone # Fax #    Helga Ibarra [phone]       [place] [id number]        Thank you!     Thank you for choosing [facility]  for your care. Our goal is always to provide you with excellent care. Hearing back from our patients is one way we can continue to improve our services. Please take a few minutes to complete the written survey that you may receive in the mail after your visit with us. Thank you!             Your Updated Medication List - Protect others around you: Learn how to safely use, store and throw away your medicines at www.disposemymeds.org.          This  list is accurate as of: 3/9/17 10:14 AM.  Always use your most recent med list.                   Brand Name Dispense Instructions for use    albuterol 108 (90 BASE) MCG/ACT Inhaler    PROAIR HFA/PROVENTIL HFA/VENTOLIN HFA    1 Inhaler    Inhale 2 puffs into the lungs every 6 hours as needed for shortness of breath / dyspnea       cefPROZIL 500 MG tablet    CEFZIL    20 tablet    Take 1 tablet (500 mg) by mouth 2 times daily       fluticasone 110 MCG/ACT Inhaler    FLOVENT HFA    1 Inhaler    Inhale 2 puffs into the lungs 2 times daily 2 puffs       fluticasone 50 MCG/ACT spray    FLONASE    1 Bottle    Spray 1-2 sprays into both nostrils daily       * lisinopril 2.5 MG tablet    PRINIVIL/Zestril    90 tablet    TAKE ONE TABLET BY MOUTH EVERY DAY       * lisinopril 5 MG tablet    PRINIVIL/ZESTRIL    30 tablet    Take 1 tablet (5 mg) by mouth daily       loratadine 10 MG capsule     30 capsule    Take 10 mg by mouth daily       metroNIDAZOLE 0.75 % cream    METROCREAM    45 g    APPLY TOPICALLY TWO TIMES A DAY TO FACE       * Notice:  This list has 2 medication(s) that are the same as other medications prescribed for you. Read the directions carefully, and ask your doctor or other care provider to review them with you.

## 2017-03-13 ENCOUNTER — OFFICE VISIT (OUTPATIENT)
Dept: FAMILY MEDICINE | Facility: CLINIC | Age: 62
End: 2017-03-13
Payer: COMMERCIAL

## 2017-03-13 ENCOUNTER — TELEPHONE (OUTPATIENT)
Dept: FAMILY MEDICINE | Facility: CLINIC | Age: 62
End: 2017-03-13

## 2017-03-13 VITALS
OXYGEN SATURATION: 97 % | SYSTOLIC BLOOD PRESSURE: 126 MMHG | BODY MASS INDEX: 24.26 KG/M2 | HEART RATE: 82 BPM | WEIGHT: 163.81 LBS | HEIGHT: 69 IN | DIASTOLIC BLOOD PRESSURE: 86 MMHG | TEMPERATURE: 97.7 F

## 2017-03-13 DIAGNOSIS — H65.191 ACUTE MUCOID OTITIS MEDIA OF RIGHT EAR: Primary | ICD-10-CM

## 2017-03-13 PROCEDURE — 99213 OFFICE O/P EST LOW 20 MIN: CPT | Performed by: PHYSICIAN ASSISTANT

## 2017-03-13 RX ORDER — AZITHROMYCIN 250 MG/1
TABLET, FILM COATED ORAL
Qty: 6 TABLET | Refills: 0 | Status: SHIPPED | OUTPATIENT
Start: 2017-03-13 | End: 2017-03-22

## 2017-03-13 NOTE — TELEPHONE ENCOUNTER
Acute Illness   Acute illness concerns: recent bronchitis, ear infection, seen 5 days ago again with [facility]  Onset: about 1.5 months    Fever: no    Chills/Sweats: no    Headache (location?): YES    Sinus Pressure:YES- post-nasal drainage    Conjunctivitis:  no    Ear Pain: YES- and plugged up ear right    Rhinorrhea: YES    Congestion: YES    Sore Throat: no     Cough: YES-productive of yellow sputum, productive of green sputum    Wheeze: no    Decreased Appetite: no    Nausea: no    Vomiting: no    Diarrhea:  no    Dysuria/Freq.: no    Fatigue/Achiness: YES- fatigue    Sick/Strep Exposure: no     Therapies Tried and outcome: cefprozil for 5 days, mucinex- minimally effective     Advised to continue medication and be seen today. Pt reports they feel miserable. Advised it can take some time for antibiotics to fully kick in. The patient indicates understanding of these issues and agrees with the plan.     Home Care Instructions for cold symptoms:  Take a pain reliever of choice for fever and discomfort.  Do not give Aspirin to child.  Avoid acetaminophen if liver disease is present.  Avoid ibuprofen if kidney disease or stomach problems exist or with pregnancy.  Follow the directions on the label.    Rest  Drink 6-8 glasses of liquids daily, especially warm liquids such as tea with lemon and honey.  Take a decongestant of choice for congestion (unless history of hypertension)  Take expectorant of choice for cough.    Use saline nose drops as needed for nasal congestion.    Use a vaporizer or humidifier to keep air moist, especially at night, and change the water daily.  If throat is sore, gargle several times a day with warm water.  Use frozen cough drops or hard candy, or sip warm chicken broth for additional relief.  Avoid smoking and exposure to second-hand smoke.    Please report the following to the clinic:  Persistent fever greater than 3 days or temperature of 105 degrees F  Nasal discharge greater than 10  days  Persistent earache, sinus pain, or yellow eye drainage  Productive cough or fever  Condition persists or worsens    Seek Emergency care Immediately:  Difficulty breathing for reasons other than nasal congestion  Severe chest pain.    Please let us know if you have any further questions.  You can contact our clinic at [phone]    Eileen Somers RN

## 2017-03-13 NOTE — MR AVS SNAPSHOT
"              After Visit Summary   3/13/2017    Jolynn Haji    MRN: [id number]           Patient Information     Date Of Birth          1955        Visit Information        Provider Department      3/13/2017 2:00 PM Theresa Poe PA-C [facility]        Today's Diagnoses     Acute mucoid otitis media of right ear    -  1       Follow-ups after your visit        Your next 10 appointments already scheduled     Apr 27, 2017  8:15 AM CDT   PHYSICAL with Helga Ibarra MD   [facility] ([facility]    [address] [place] [id number] [phone]              Who to contact     If you have questions or need follow up information about today's clinic visit or your schedule please contact [facility] directly at [phone].  Normal or non-critical lab and imaging results will be communicated to you by MyChart, letter or phone within 4 business days after the clinic has received the results. If you do not hear from us within 7 days, please contact the clinic through MyChart or phone. If you have a critical or abnormal lab result, we will notify you by phone as soon as possible.  Submit refill requests through [facility] or call your pharmacy and they will forward the refill request to us. Please allow 3 business days for your refill to be completed.          Additional Information About Your Visit        MyChart Information     [facility] lets you send messages to your doctor, view your test results, renew your prescriptions, schedule appointments and more. To sign up, go to [email or web address] . Click on \"Log in\" on the left side of the screen, which will take you to the Welcome page. Then click on \"Sign up Now\" on the right side of the page.     You will be asked to enter the access code listed below, as well as some personal information. Please follow the directions to create your username and password.     Your " "access code is: [email or web address]  Expires: 2017  2:20 PM     Your access code will  in 90 days. If you need help or a new code, please call your [facility] clinic or [phone].        Care EveryWhere ID     This is your Care EveryWhere ID. This could be used by other organizations to access your [facility] medical records  [id number]        Your Vitals Were     Pulse Temperature Height Last Period Pulse Oximetry BMI (Body Mass Index)    82 97.7  F (36.5  C) (Tympanic) 5' 9\" (1.753 m) 2001 97% 24.19 kg/m2       Blood Pressure from Last 3 Encounters:   17 126/86   17 132/80   17 137/80    Weight from Last 3 Encounters:   17 163 lb 13 oz (74.3 kg)   17 163 lb 12.8 oz (74.3 kg)   16 163 lb 12.8 oz (74.3 kg)              Today, you had the following     No orders found for display         Today's Medication Changes          These changes are accurate as of: 3/13/17  2:29 PM.  If you have any questions, ask your nurse or doctor.               Start taking these medicines.        Dose/Directions    azithromycin 250 MG tablet   Commonly known as:  ZITHROMAX   Used for:  Acute mucoid otitis media of right ear   Started by:  Theresa Poe PA-C        Two tablets first day, then one tablet daily for four days.   Quantity:  6 tablet   Refills:  0            Where to get your medicines      These medications were sent to [facility] [place] - [address] [address]     Phone:  [phone]     azithromycin 250 MG tablet                Primary Care Provider Office Phone # Fax #    Helga Ibarra [phone]       [address] [address]        Thank you!     Thank you for choosing [facility]  for your care. Our goal is always to provide you with excellent care. Hearing back from our patients is one way we can " continue to improve our services. Please take a few minutes to complete the written survey that you may receive in the mail after your visit with us. Thank you!             Your Updated Medication List - Protect others around you: Learn how to safely use, store and throw away your medicines at www.disposemymeds.org.          This list is accurate as of: 3/13/17  2:29 PM.  Always use your most recent med list.                   Brand Name Dispense Instructions for use    albuterol 108 (90 BASE) MCG/ACT Inhaler    PROAIR HFA/PROVENTIL HFA/VENTOLIN HFA    1 Inhaler    Inhale 2 puffs into the lungs every 6 hours as needed for shortness of breath / dyspnea       azithromycin 250 MG tablet    ZITHROMAX    6 tablet    Two tablets first day, then one tablet daily for four days.       cefPROZIL 500 MG tablet    CEFZIL    20 tablet    Take 1 tablet (500 mg) by mouth 2 times daily       fluticasone 110 MCG/ACT Inhaler    FLOVENT HFA    1 Inhaler    Inhale 2 puffs into the lungs 2 times daily 2 puffs       fluticasone 50 MCG/ACT spray    FLONASE    1 Bottle    Spray 1-2 sprays into both nostrils daily       * lisinopril 2.5 MG tablet    PRINIVIL/Zestril    90 tablet    TAKE ONE TABLET BY MOUTH EVERY DAY       * lisinopril 5 MG tablet    PRINIVIL/ZESTRIL    30 tablet    Take 1 tablet (5 mg) by mouth daily       loratadine 10 MG capsule     30 capsule    Take 10 mg by mouth daily       metroNIDAZOLE 0.75 % cream    METROCREAM    45 g    APPLY TOPICALLY TWO TIMES A DAY TO FACE       * Notice:  This list has 2 medication(s) that are the same as other medications prescribed for you. Read the directions carefully, and ask your doctor or other care provider to review them with you.

## 2017-03-13 NOTE — PROGRESS NOTES
SUBJECTIVE:                                                    Jolynn Haji is a 61 year old female who presents to clinic today for the following health issues:    Cough  Patient presents to clinic today with chief complaint of cough. Onset of symptoms was ~1 month ago. She was last seen on 3/9/17 by Dr. Ibarra for sinusitis, bronchitis, and otitis of the right. She was started on antipyrine-benzocaine drops, cefprozil 500 mg, and fluticasone nasal spray. Today she complains of no improvement of ear pain and pressure. Sinus symptoms have very mildly improved. Cough has been constant and consistent. States that right ear pain has been so severe she has been taking OTC ibuprofen, acetaminophen, and mucinex at the same time to try to better manage pain. Has also tried warm compress to manage pain.    Problem list and histories reviewed & adjusted, as indicated.  Additional history: as documented    Patient Active Problem List   Diagnosis     Essential hypertension with goal blood pressure less than 140/90     Hidradenitis     CELIAC DISEASE- family hx of      Localized osteoarthritis of hand     Synovial cyst of popliteal space     DERMATOPHYTOSIS OF NAIL- toenails      Lipidosis     Osteopenia     Postmenopausal     NUMBNESS AND TINGLING OF FOOT - bilateral -mild      CARDIOVASCULAR SCREENING; LDL GOAL LESS THAN 160     Numbness of feet- distal feet R>L      Advanced directives, counseling/discussion     Stress incontinence, female - mild     Cough     Acute bronchitis- recurrent- ? related to mold in school building- pt has since retired and no further bronchitis     Rosacea     History of migraine headaches- assoc. with nausea/vomiting - resolved around menopause - were  premenstrual      Dupuytren's disease of palm - right 4th digit flexor tendon     Sun-damaged skin     Seasonal allergic rhinitis     Basal cell carcinoma of leg, right     Past Surgical History   Procedure Laterality Date     C nonspecific  procedure  2000     Colposcopy       Social History   Substance Use Topics     Smoking status: Never Smoker     Smokeless tobacco: Never Used     Alcohol use 12.0 oz/week      Comment: rarely      Family History   Problem Relation Age of Onset     Thyroid Disease Mother      Graves disease     Hypertension Mother      DIABETES Mother      type 2     Blood Disease Mother       from leukemia at age 78     DIABETES Daughter      HEART DISEASE Father 62     MV problem with CHF  at 62/ from MI     Hypertension Father      Cardiovascular Father      CANCER Maternal Uncle      type ??     CANCER Maternal Uncle      liver         Current Outpatient Prescriptions   Medication Sig Dispense Refill     lisinopril (PRINIVIL/ZESTRIL) 5 MG tablet Take 1 tablet (5 mg) by mouth daily 30 tablet 1     cefPROZIL (CEFZIL) 500 MG tablet Take 1 tablet (500 mg) by mouth 2 times daily 20 tablet 0     fluticasone (FLONASE) 50 MCG/ACT spray Spray 1-2 sprays into both nostrils daily 1 Bottle 11     lisinopril (PRINIVIL,ZESTRIL) 2.5 MG tablet TAKE ONE TABLET BY MOUTH EVERY DAY 90 tablet 1     loratadine 10 MG capsule Take 10 mg by mouth daily 30 capsule      albuterol (PROAIR HFA, PROVENTIL HFA, VENTOLIN HFA) 108 (90 BASE) MCG/ACT inhaler Inhale 2 puffs into the lungs every 6 hours as needed for shortness of breath / dyspnea 1 Inhaler 5     fluticasone (FLOVENT HFA) 110 MCG/ACT inhaler Inhale 2 puffs into the lungs 2 times daily 2 puffs 1 Inhaler 12     metroNIDAZOLE (METROCREAM) 0.75 % cream APPLY TOPICALLY TWO TIMES A DAY TO FACE 45 g 5     Allergies   Allergen Reactions     Augmentin Itching     itching, rash     Fosamax Diarrhea     Diarrhea, stomach cramps, jaw pain        Reviewed and updated as needed this visit by clinical staff  Tobacco  Allergies  Meds       Reviewed and updated as needed this visit by Provider         ROS:  Constitutional, HEENT, cardiovascular, pulmonary, GI, , musculoskeletal, neuro, skin,  "endocrine and psych systems are negative, except as otherwise noted.    This document serves as a record of the services and decisions personally performed and made by Theresa Poe PA-C. It was created on her behalf by Kandi Lora, a trained medical scribe. The creation of this document is based the provider's statements to the medical scribe.  Kandi Lora, March 13, 2017 2:08 PM    OBJECTIVE:                                                    /86  Pulse 82  Temp 97.7  F (36.5  C) (Tympanic)  Ht 5' 9\" (1.753 m)  Wt 163 lb 13 oz (74.3 kg)  LMP 06/28/2001  SpO2 97%  BMI 24.19 kg/m2  Body mass index is 24.19 kg/(m^2).     GENERAL: healthy, alert and no distress  HENT: dull erythema w/ bullae of right TM and air fluid level evident, left canal clear, nose and mouth without ulcers or lesions  NECK: no adenopathy, no asymmetry, masses, or scars and thyroid normal to palpation  RESP: lungs clear to auscultation - no rales, rhonchi or wheezes  CV: regular rate and rhythm, normal S1 S2, no S3 or S4, no murmur, click or rub, no peripheral edema and peripheral pulses strong  NEURO: Normal strength and tone, mentation intact and speech normal  PSYCH: mentation appears normal, affect normal/bright    Diagnostic Test Results:  none      ASSESSMENT/PLAN:                                                    Jolynn was seen today for cough.    Diagnoses and all orders for this visit:    Acute mucoid otitis media of right ear  Advised patient to continue OTC mucinex, alternating acetaminophen/ ibuprofen, continue fluticasone nasal spray, and finish 10 day course of cefprozil. Begin taking azithromycin as prescribed for 5 days to improve ear symptoms due to presumtive mycoplasma involvement  -     azithromycin (ZITHROMAX) 250 MG tablet; Two tablets first day, then one tablet daily for four days.    The information in this document, created by the medical scribe for me, accurately reflects the services I personally performed and " the decisions made by me. I have reviewed and approved this document for accuracy prior to leaving the patient care area. Theresa Poe PA-C March 13, 2017 2:08 PM    Theresa Poe PA-C  [facility] [place]

## 2017-03-17 ENCOUNTER — HEALTH MAINTENANCE LETTER (OUTPATIENT)
Age: 62
End: 2017-03-17

## 2017-03-22 ENCOUNTER — OFFICE VISIT (OUTPATIENT)
Dept: FAMILY MEDICINE | Facility: CLINIC | Age: 62
End: 2017-03-22
Payer: COMMERCIAL

## 2017-03-22 ENCOUNTER — RADIANT APPOINTMENT (OUTPATIENT)
Dept: GENERAL RADIOLOGY | Facility: CLINIC | Age: 62
End: 2017-03-22
Attending: FAMILY MEDICINE
Payer: COMMERCIAL

## 2017-03-22 VITALS
TEMPERATURE: 98.3 F | SYSTOLIC BLOOD PRESSURE: 136 MMHG | HEART RATE: 89 BPM | HEIGHT: 69 IN | OXYGEN SATURATION: 98 % | BODY MASS INDEX: 24.14 KG/M2 | DIASTOLIC BLOOD PRESSURE: 70 MMHG | WEIGHT: 163 LBS

## 2017-03-22 DIAGNOSIS — J01.11 ACUTE RECURRENT FRONTAL SINUSITIS: ICD-10-CM

## 2017-03-22 DIAGNOSIS — J01.01 ACUTE RECURRENT MAXILLARY SINUSITIS: ICD-10-CM

## 2017-03-22 DIAGNOSIS — H65.191 ACUTE OTITIS MEDIA WITH EFFUSION OF RIGHT EAR: ICD-10-CM

## 2017-03-22 DIAGNOSIS — J01.11 ACUTE RECURRENT FRONTAL SINUSITIS: Primary | ICD-10-CM

## 2017-03-22 PROCEDURE — 70220 X-RAY EXAM OF SINUSES: CPT

## 2017-03-22 PROCEDURE — 99214 OFFICE O/P EST MOD 30 MIN: CPT | Performed by: FAMILY MEDICINE

## 2017-03-22 RX ORDER — LEVOFLOXACIN 500 MG/1
500 TABLET, FILM COATED ORAL DAILY
Qty: 21 TABLET | Refills: 0 | Status: SHIPPED | OUTPATIENT
Start: 2017-03-22 | End: 2017-04-24

## 2017-03-22 NOTE — MR AVS SNAPSHOT
After Visit Summary   3/22/2017    Jolynn Haji    MRN: [id number]           Patient Information     Date Of Birth          1955        Visit Information        Provider Department      3/22/2017 2:30 PM Helga Ibarra MD [facility]        Today's Diagnoses     Acute recurrent frontal sinusitis    -  1    Acute recurrent maxillary sinusitis        Acute otitis media with effusion of right ear          Care Instructions    Continue with flonase, but 2 sprays each nostril  twice daily for 21 days.  Will treat with levaquin 500mg daily for 21 days as well.    try eat one yogurt ( with active cultures) daily or oral probiotic otc such as Culturelle or Align to help restore your natural gut bacteria to hopefully prevent yeast infections and/or diarrhea sometimes assoc. with this antibiotic.       Consider sinus CT scan and/or ENT referral if symptoms not fully clearing in the next 2-3 weeks.  Ok for tylenol 650mg every 4 hours ( max tylenol dose = 3000mg in 24 hours) and Ibuprofen with food 4-200mg tabs every 8 hours - max 2400mg in a 24 hr period     See Patient Instructions                    Sinusitis           What is sinusitis?   Sinusitis is swollen, infected linings of the sinuses. The sinuses are hollow spaces in the bones of your face and skull. They connect with the nose through small openings. Like the nose, their linings make mucus.   How does it occur?   Sinusitis occurs when the sinus linings become infected. The passageways from the sinuses to the nose are very narrow. Swelling and mucus may block the passageways. This leads to pressure changes in the sinuses that can be painful.   A number of things can cause swelling and sinusitis. Most often it's allergens (things that cause allergies, like pollen and mold) and viruses, such as viruses that cause the common cold. Whether the cause is allergies or a virus, the sinus linings can swell. When swelling causes the  sinus passageway to swell shut, bacteria, viruses, and even fungus can be trapped in the sinuses and cause a sinus infection.   If your nasal bones have been injured or are deformed, causing partial blockage of the sinus openings, you are more likely to get sinusitis.   What are the symptoms?   Symptoms include:   feeling of fullness or pressure in your head   a headache that is most painful when you first wake up in the morning or when you bend your head down or forward   pain above or below your eyes   aching in the upper jaw and teeth   runny or stuffy nose   cough, especially at night   fluid draining down the back of your throat (postnasal drainage)   sore throat in the morning or evening.   How is it diagnosed?   Your healthcare provider will ask about your symptoms and will examine you. You may have an X-ray to look for swelling, fluid, or small benign growths (polyps) in the sinuses.   How is it treated?   Decongestants may help. They may be nonprescription or prescription. They are available as liquids, pills, and nose sprays.   Your healthcare provider may prescribe an antibiotic. In some cases you may need to take decongestants and antibiotics for several weeks.   You may need nonprescription medicine for pain, such as acetaminophen or ibuprofen. Check with your healthcare provider before you give any medicine that contains aspirin or salicylates to a child or teen. This includes medicines like baby aspirin, some cold medicines, and Pepto Bismol. Children and teens who take aspirin are at risk for a serious illness called Reye's syndrome. Ibuprofen is an NSAID. Nonsteroidal anti-inflammatory medicines (NSAIDs) may cause stomach bleeding and other problems. These risks increase with age. Read the label and take as directed. Unless recommended by your healthcare provider, do not take NSAIDs for more than 10 days for any reason.   If you have chronic or repeated sinus infections, allergies may be the cause.  Your healthcare provider may prescribe antihistamine tablets or prescription nasal sprays (steroids or cromolyn) to treat the allergies.   If you have chronic, severe sinusitis that does not respond to treatment with medicines, surgery may be done. The surgeon can create an extra or enlarged passageway in the wall of the sinus cavity. This allows the sinuses to drain more easily through the nasal passages. This should help them stay free of infection.   How long will the effects last?   Symptoms may get better gradually over 3 to 10 days. Depending on what caused the sinusitis and how severe it is, it may last for days or weeks. The symptoms may come back if you do not finish all of your antibiotic.   How can I take care of myself?   Follow your healthcare provider's instructions.   If you are taking an antibiotic, take all of it as directed by your provider. If you stop taking the medicine when your symptoms are gone but before you have taken all of the medicine, symptoms may come back.   Avoid tobacco smoke.   If you have allergies, take care to avoid the things you are allergic to, such as animal dander.   Add moisture to the air with a humidifier or a vaporizer, unless you have mold allergy (mold may grow in your vaporizer).   Inhale steam from a basin of hot water or shower to help open your sinuses and relieve pain.   Use saline nasal sprays to help wash out nasal passages and clear some mucus from the airways.   Use decongestants as directed on the label or by your provider.   If you are using a nonprescription nasal-spray decongestant, generally you should not use it for more than 3 days. After 3 days it may cause your symptoms to get worse. Ask your healthcare provider if it is OK for you to use a nasal spray decongestant longer than this.   Get plenty of rest.   Drink more fluids to keep the mucus as thin as possible so your sinuses can drain more easily.   Put warm compresses on painful areas.   Take  antibiotics as prescribed. Use all of the medicine, even after you feel better. Some sinus infections require 2 to 4 weeks of antibiotic treatment.   See your healthcare provider if the pain lasts for several days or gets worse.   If the sinus areas above or below your eyes are swollen or bulging, see your healthcare provider right away. This symptom may mean that the infection is spreading. A spreading infection can affect other parts of your body--even the brain--and needs to be treated promptly.   How can I help prevent sinusitis?   Treat your colds and allergies promptly. Use decongestants as soon as you start having symptoms.   Do not smoke and stay away from secondhand smoke.   Drink lots of fluids to keep the mucus thin.   Humidify your home if the air is particularly dry.   If you have sinus infections often, consider having allergy tests.   If sinusitis continues to be a problem despite treatment, you might need an exam by an ear, nose, and throat doctor (called an ENT or otolaryngologist). The specialist will check for polyps or a deformed bone that may be blocking your sinuses.     Published by [employer].  This content is reviewed periodically and is subject to change as new health information becomes available. The information is intended to inform and educate and is not a replacement for medical evaluation, advice, diagnosis or treatment by a healthcare professional.   Developed by [employer].   ? 2010 [employer] and/or its affiliates. All Rights Reserved.   Copyright   [employer] 2011  Adult Health Advisor                   Thank you for choosing [facility]  for your Health Care. It was a pleasure seeing you at your visit today. Please contact us with any questions or concerns you may have.                   Helga Ibarra MD                                  To reach your [facility] care team after hours call:   [phone]    Our clinic  hours are:     Monday- 7:30 am - 7:00 pm                             Tuesday through Friday- 7:30 am - 5:00 pm                                        Saturday- 8:00 am - 12:00 pm                  Phone:  [phone]    Our pharmacy hours are:     Monday  8:00 am to 7:00 pm      Tuesday through Friday 8:00am to 6:00pm                        Saturday - 9:00 am to 1:00 pm      Sunday : Closed.              Phone:  [phone]      There is also information available at our web site:  [email or web address]    If your provider ordered any lab tests and you do not receive the results within 10 business days, please call the clinic.    If you need a medication refill please contact your pharmacy.  Please allow 2 business days for your refill to be completed.    Our clinic offers telephone visits and e visits.  Please ask one of your team members to explain more.      Use [employer] (secure email communication and access to your chart) to send your primary care provider a message or make an appointment. Ask someone on your Team how to sign up for [employer].                     Follow-ups after your visit        Your next 10 appointments already scheduled     Apr 27, 2017  8:15 AM CDT   PHYSICAL with Helga Ibarra MD   [facility]    [address] [place] [id number] [phone]              Who to contact     If you have questions or need follow up information about today's clinic visit or your schedule please contact [facility] directly at [phone].  Normal or non-critical lab and imaging results will be communicated to you by MyChart, letter or phone within 4 business days after the clinic has received the results. If you do not hear from us within 7 days, please contact the clinic through MyChart or phone. If you have a critical or abnormal lab result, we will notify you by phone as soon as possible.  Submit refill requests  "through [employer] or call your pharmacy and they will forward the refill request to us. Please allow 3 business days for your refill to be completed.          Additional Information About Your Visit        payworkshart Information     [employer] lets you send messages to your doctor, view your test results, renew your prescriptions, schedule appointments and more. To sign up, go to [email or web address] . Click on \"Log in\" on the left side of the screen, which will take you to the Welcome page. Then click on \"Sign up Now\" on the right side of the page.     You will be asked to enter the access code listed below, as well as some personal information. Please follow the directions to create your username and password.     Your access code is: [email or web address]  Expires: 2017  2:20 PM     Your access code will  in 90 days. If you need help or a new code, please call your [facility] clinic or [phone].        Care EveryWhere ID     This is your Care EveryWhere ID. This could be used by other organizations to access your [facility] medical records  [id number]        Your Vitals Were     Pulse Temperature Height Last Period Pulse Oximetry BMI (Body Mass Index)    89 98.3  F (36.8  C) (Oral) 5' 9\" (1.753 m) 2001 98% 24.07 kg/m2       Blood Pressure from Last 3 Encounters:   17 136/70   17 126/86   17 132/80    Weight from Last 3 Encounters:   17 163 lb (73.9 kg)   17 163 lb 13 oz (74.3 kg)   17 163 lb 12.8 oz (74.3 kg)                 Today's Medication Changes          These changes are accurate as of: 3/22/17  3:33 PM.  If you have any questions, ask your nurse or doctor.               Start taking these medicines.        Dose/Directions    levofloxacin 500 MG tablet   Commonly known as:  LEVAQUIN   Used for:  Acute recurrent frontal sinusitis   Started by:  Helga Ibarra MD        Dose:  500 mg   Take 1 tablet (500 mg) by mouth daily   Quantity:  21 tablet   Refills:  0 "         Stop taking these medicines if you haven't already. Please contact your care team if you have questions.     azithromycin 250 MG tablet   Commonly known as:  ZITHROMAX   Stopped by:  Helga Ibarra MD           cefPROZIL 500 MG tablet   Commonly known as:  CEFZIL   Stopped by:  Helga Ibarra MD                Where to get your medicines      These medications were sent to [facility] [address] [facility]  [address] [place] [address]     Phone:  [phone]     levofloxacin 500 MG tablet                Primary Care Provider Office Phone # Fax #    Helga Ibarra [phone]       [address] [address]        Thank you!     Thank you for choosing [facility]  for your care. Our goal is always to provide you with excellent care. Hearing back from our patients is one way we can continue to improve our services. Please take a few minutes to complete the written survey that you may receive in the mail after your visit with us. Thank you!             Your Updated Medication List - Protect others around you: Learn how to safely use, store and throw away your medicines at www.disposemymeds.org.          This list is accurate as of: 3/22/17  3:33 PM.  Always use your most recent med list.                   Brand Name Dispense Instructions for use    albuterol 108 (90 BASE) MCG/ACT Inhaler    PROAIR HFA/PROVENTIL HFA/VENTOLIN HFA    1 Inhaler    Inhale 2 puffs into the lungs every 6 hours as needed for shortness of breath / dyspnea       fluticasone 110 MCG/ACT Inhaler    FLOVENT HFA    1 Inhaler    Inhale 2 puffs into the lungs 2 times daily 2 puffs       fluticasone 50 MCG/ACT spray    FLONASE    1 Bottle    Spray 1-2 sprays into both nostrils daily       levofloxacin 500 MG tablet    LEVAQUIN    21 tablet    Take 1 tablet (500 mg) by mouth daily        * lisinopril 2.5 MG tablet    PRINIVIL/Zestril    90 tablet    TAKE ONE TABLET BY MOUTH EVERY DAY       * lisinopril 5 MG tablet    PRINIVIL/ZESTRIL    30 tablet    Take 1 tablet (5 mg) by mouth daily       loratadine 10 MG capsule     30 capsule    Take 10 mg by mouth daily       metroNIDAZOLE 0.75 % cream    METROCREAM    45 g    APPLY TOPICALLY TWO TIMES A DAY TO FACE       * Notice:  This list has 2 medication(s) that are the same as other medications prescribed for you. Read the directions carefully, and ask your doctor or other care provider to review them with you.

## 2017-03-22 NOTE — PROGRESS NOTES
"  SUBJECTIVE:                                                    Jolynn Haji is a 61 year old female who presents to clinic today for the following health issues:    Acute Illness - Follow up   Acute illness concerns: seen on 03/13/17 for cough and right ear pain x1 month    Fever: no    Chills/Sweats: no    Headache (location?): YES- frontal    Sinus Pressure:YES- facial pain    Conjunctivitis:  no    Ear Pain: YES: right, feels plugged    Rhinorrhea: YES- clear    Congestion: no    Sore Throat: no     Cough: no - resolved fully.     Wheeze: no    Decreased Appetite: no    Nausea: YES off and on, from headache    Vomiting: no    Diarrhea:  no    Dysuria/Freq.: no    Fatigue/Achiness: YES- fatigue    Sick/Strep Exposure: no     Therapies Tried and outcome:for sinuses =  Cefzil (finished on 3/18) - didn't help, Azithromycin (finished on 03/16) - didn't help, Flonase - didn't help.      Still having signif. Frontal sinus pressure and getting some nausea with it as well and right ear plugged still.      Problem list and histories reviewed & adjusted, as indicated.  Additional history: as documented    Reviewed and updated as needed this visit by clinical staff  Tobacco  Allergies  Meds  Med Hx  Surg Hx  Fam Hx  Soc Hx      Reviewed and updated as needed this visit by Provider       ROS:   ROS: 12 point ROS neg other than the symptoms noted above.     OBJECTIVE:                                                    /70 (BP Location: Right arm, Patient Position: Chair, Cuff Size: Adult Regular)  Pulse 89  Temp 98.3  F (36.8  C) (Oral)  Ht 5' 9\" (1.753 m)  Wt 163 lb (73.9 kg)  LMP 06/28/2001  SpO2 98%  BMI 24.07 kg/m2  Body mass index is 24.07 kg/(m^2).   GENERAL: healthy, alert, well nourished, well hydrated, no distress  HENT: ear canals- normal; left TMs is normal; right TM is cloudy and bulging, but not red, Nose-congested with bilateral maxillary and frontal  sinus tenderness to palpation;  ; Mouth- " no ulcers, no lesions  NECK: no tenderness, no adenopathy, no asymmetry, no masses, no stiffness; thyroid- normal to palpation.   RESP: lungs clear to auscultation - no rales, no rhonchi, no wheezes  CV: regular rates and rhythm, normal S1 S2, no S3 or S4 and no murmur, no click or rub -  ABDOMEN: soft, no tenderness, no  hepatosplenomegaly, no masses, normal bowel sounds  MS: extremities- no gross deformities noted, no edema.     Diagnostic test results:  Sinus xrays = frontal sinus cloudiness and air fluid levels in maxillary sinuses as well.      ASSESSMENT/PLAN:                                                        ICD-10-CM    1. Acute recurrent frontal sinusitis J01.11 XR Sinus Complete G/E 3 Views     levofloxacin (LEVAQUIN) 500 MG tablet   2. Acute recurrent maxillary sinusitis J01.01 XR Sinus Complete G/E 3 Views     levofloxacin (LEVAQUIN) 500 MG tablet   3. Acute otitis media with effusion of right ear H65.191 levofloxacin (LEVAQUIN) 500 MG tablet        Continue with flonase, but 2 sprays each nostril  twice daily for 21 days.  Will treat with levaquin 500mg daily for 21 days as well.    try eat one yogurt ( with active cultures) daily or oral probiotic otc such as Culturelle or Align to help restore your natural gut bacteria to hopefully prevent yeast infections and/or diarrhea sometimes assoc. with this antibiotic.       Consider sinus CT scan and/or ENT referral if symptoms not fully clearing in the next 2-3 weeks.  Ok for tylenol 650mg every 4 hours ( max tylenol dose = 3000mg in 24 hours) and Ibuprofen with food 4-200mg tabs every 8 hours - max 2400mg in a 24 hr period     See Patient Instructions         Helga Ibarra MD    [facility]

## 2017-03-22 NOTE — PATIENT INSTRUCTIONS
Continue with flonase, but 2 sprays each nostril  twice daily for 21 days.  Will treat with levaquin 500mg daily for 21 days as well.    try eat one yogurt ( with active cultures) daily or oral probiotic otc such as Culturelle or Align to help restore your natural gut bacteria to hopefully prevent yeast infections and/or diarrhea sometimes assoc. with this antibiotic.       Consider sinus CT scan and/or ENT referral if symptoms not fully clearing in the next 2-3 weeks.  Ok for tylenol 650mg every 4 hours ( max tylenol dose = 3000mg in 24 hours) and Ibuprofen with food 4-200mg tabs every 8 hours - max 2400mg in a 24 hr period     See Patient Instructions                    Sinusitis           What is sinusitis?   Sinusitis is swollen, infected linings of the sinuses. The sinuses are hollow spaces in the bones of your face and skull. They connect with the nose through small openings. Like the nose, their linings make mucus.   How does it occur?   Sinusitis occurs when the sinus linings become infected. The passageways from the sinuses to the nose are very narrow. Swelling and mucus may block the passageways. This leads to pressure changes in the sinuses that can be painful.   A number of things can cause swelling and sinusitis. Most often it's allergens (things that cause allergies, like pollen and mold) and viruses, such as viruses that cause the common cold. Whether the cause is allergies or a virus, the sinus linings can swell. When swelling causes the sinus passageway to swell shut, bacteria, viruses, and even fungus can be trapped in the sinuses and cause a sinus infection.   If your nasal bones have been injured or are deformed, causing partial blockage of the sinus openings, you are more likely to get sinusitis.   What are the symptoms?   Symptoms include:   feeling of fullness or pressure in your head   a headache that is most painful when you first wake up in the morning or when you bend your head down or  forward   pain above or below your eyes   aching in the upper jaw and teeth   runny or stuffy nose   cough, especially at night   fluid draining down the back of your throat (postnasal drainage)   sore throat in the morning or evening.   How is it diagnosed?   Your healthcare provider will ask about your symptoms and will examine you. You may have an X-ray to look for swelling, fluid, or small benign growths (polyps) in the sinuses.   How is it treated?   Decongestants may help. They may be nonprescription or prescription. They are available as liquids, pills, and nose sprays.   Your healthcare provider may prescribe an antibiotic. In some cases you may need to take decongestants and antibiotics for several weeks.   You may need nonprescription medicine for pain, such as acetaminophen or ibuprofen. Check with your healthcare provider before you give any medicine that contains aspirin or salicylates to a child or teen. This includes medicines like baby aspirin, some cold medicines, and Pepto Bismol. Children and teens who take aspirin are at risk for a serious illness called Reye's syndrome. Ibuprofen is an NSAID. Nonsteroidal anti-inflammatory medicines (NSAIDs) may cause stomach bleeding and other problems. These risks increase with age. Read the label and take as directed. Unless recommended by your healthcare provider, do not take NSAIDs for more than 10 days for any reason.   If you have chronic or repeated sinus infections, allergies may be the cause. Your healthcare provider may prescribe antihistamine tablets or prescription nasal sprays (steroids or cromolyn) to treat the allergies.   If you have chronic, severe sinusitis that does not respond to treatment with medicines, surgery may be done. The surgeon can create an extra or enlarged passageway in the wall of the sinus cavity. This allows the sinuses to drain more easily through the nasal passages. This should help them stay free of infection.   How long  will the effects last?   Symptoms may get better gradually over 3 to 10 days. Depending on what caused the sinusitis and how severe it is, it may last for days or weeks. The symptoms may come back if you do not finish all of your antibiotic.   How can I take care of myself?   Follow your healthcare provider's instructions.   If you are taking an antibiotic, take all of it as directed by your provider. If you stop taking the medicine when your symptoms are gone but before you have taken all of the medicine, symptoms may come back.   Avoid tobacco smoke.   If you have allergies, take care to avoid the things you are allergic to, such as animal dander.   Add moisture to the air with a humidifier or a vaporizer, unless you have mold allergy (mold may grow in your vaporizer).   Inhale steam from a basin of hot water or shower to help open your sinuses and relieve pain.   Use saline nasal sprays to help wash out nasal passages and clear some mucus from the airways.   Use decongestants as directed on the label or by your provider.   If you are using a nonprescription nasal-spray decongestant, generally you should not use it for more than 3 days. After 3 days it may cause your symptoms to get worse. Ask your healthcare provider if it is OK for you to use a nasal spray decongestant longer than this.   Get plenty of rest.   Drink more fluids to keep the mucus as thin as possible so your sinuses can drain more easily.   Put warm compresses on painful areas.   Take antibiotics as prescribed. Use all of the medicine, even after you feel better. Some sinus infections require 2 to 4 weeks of antibiotic treatment.   See your healthcare provider if the pain lasts for several days or gets worse.   If the sinus areas above or below your eyes are swollen or bulging, see your healthcare provider right away. This symptom may mean that the infection is spreading. A spreading infection can affect other parts of your body--even the  brain--and needs to be treated promptly.   How can I help prevent sinusitis?   Treat your colds and allergies promptly. Use decongestants as soon as you start having symptoms.   Do not smoke and stay away from secondhand smoke.   Drink lots of fluids to keep the mucus thin.   Humidify your home if the air is particularly dry.   If you have sinus infections often, consider having allergy tests.   If sinusitis continues to be a problem despite treatment, you might need an exam by an ear, nose, and throat doctor (called an ENT or otolaryngologist). The specialist will check for polyps or a deformed bone that may be blocking your sinuses.     Published by [employer].  This content is reviewed periodically and is subject to change as new health information becomes available. The information is intended to inform and educate and is not a replacement for medical evaluation, advice, diagnosis or treatment by a healthcare professional.   Developed by [employer].   ? 2010 [facility] and/or its affiliates. All Rights Reserved.   Copyright   [employer] 2011  Adult Health Advisor                   Thank you for choosing [facility]  for your Health Care. It was a pleasure seeing you at your visit today. Please contact us with any questions or concerns you may have.                   Helga Ibarra MD                                  To reach your [facility] care team after hours call:   [phone]    Our clinic hours are:     Monday- 7:30 am - 7:00 pm                             Tuesday through Friday- 7:30 am - 5:00 pm                                        Saturday- 8:00 am - 12:00 pm                  Phone:  [phone]    Our pharmacy hours are:     Monday  8:00 am to 7:00 pm      Tuesday through Friday 8:00am to 6:00pm                        Saturday - 9:00 am to 1:00 pm      Sunday : Closed.              Phone:  [phone]      There is also information  available at our web site:  [email or web address]    If your provider ordered any lab tests and you do not receive the results within 10 business days, please call the clinic.    If you need a medication refill please contact your pharmacy.  Please allow 2 business days for your refill to be completed.    Our clinic offers telephone visits and e visits.  Please ask one of your team members to explain more.      Use [facility] (secure email communication and access to your chart) to send your primary care provider a message or make an appointment. Ask someone on your Team how to sign up for [facility].

## 2017-03-22 NOTE — NURSING NOTE
"Chief Complaint   Patient presents with     RECHECK     URI       Initial /70 (BP Location: Right arm, Patient Position: Chair, Cuff Size: Adult Regular)  Pulse 89  Temp 98.3  F (36.8  C) (Oral)  Ht 5' 9\" (1.753 m)  Wt 163 lb (73.9 kg)  LMP 06/28/2001  SpO2 98%  BMI 24.07 kg/m2 Estimated body mass index is 24.07 kg/(m^2) as calculated from the following:    Height as of this encounter: 5' 9\" (1.753 m).    Weight as of this encounter: 163 lb (73.9 kg).  Medication Reconciliation: complete   Liane Escobedo CMA  "

## 2017-04-10 ENCOUNTER — TELEPHONE (OUTPATIENT)
Dept: FAMILY MEDICINE | Facility: CLINIC | Age: 62
End: 2017-04-10

## 2017-04-10 NOTE — TELEPHONE ENCOUNTER
Reason for Call:  Medication question     Detailed comments: pt is sick with flu and nausea. She is wondering if she can discontinue her levofloxacin (LEVAQUIN) 500 MG tablet early. She is on day 18. Wants to know if that is enough.     Phone Number Patient can be reached at: Cell number on file:    Telephone Information:   Mobile [phone]       Best Time: anytime     Can we leave a detailed message on this number? YES    Call taken on 4/10/2017 at 12:38 PM by Indigo Agosto

## 2017-04-10 NOTE — TELEPHONE ENCOUNTER
Noted script is for 1 tablet daily for 21 days. Per LOV:   Dx:   Acute recurrent frontal sinusitis [J01.11]  - Primary       Acute recurrent maxillary sinusitis [J01.01]       Acute otitis media with effusion of right ear [H65.191]         Continue with flonase, but 2 sprays each nostril twice daily for 21 days. Will treat with levaquin 500mg daily for 21 days as well.   try eat one yogurt ( with active cultures) daily or oral probiotic otc such as Culturelle or Align to help restore your natural gut bacteria to hopefully prevent yeast infections and/or diarrhea sometimes assoc. with this antibiotic.      Consider sinus CT scan and/or ENT referral if symptoms not fully clearing in the next 2-3 weeks. Ok for tylenol 650mg every 4 hours ( max tylenol dose = 3000mg in 24 hours) and Ibuprofen with food 4-200mg tabs every 8 hours - max 2400mg in a 24 hr period     Pt started flu sx at 3am Saturday night, diarrhea, throwing up, fell down yesterday due to exhaustion. Family member was at their house and is positive for flu.  Pt reports sinus symptoms have been fine and much better.  Pt feels good today, drinking fluids and having jello and chicken broth.  Pt reports they don't want to take the last 3 days due to nausea and upset stomach already. Pt advised they have almost their complete dosing, and due to side effects and recent illness, this is ok, the medication will still be in the pts system for days afterward.     The patient indicates understanding of these issues and agrees with the plan.  Eileen Somers RN

## 2017-04-12 ENCOUNTER — OFFICE VISIT (OUTPATIENT)
Dept: FAMILY MEDICINE | Facility: CLINIC | Age: 62
End: 2017-04-12
Payer: COMMERCIAL

## 2017-04-12 ENCOUNTER — TELEPHONE (OUTPATIENT)
Dept: FAMILY MEDICINE | Facility: CLINIC | Age: 62
End: 2017-04-12

## 2017-04-12 VITALS
HEART RATE: 114 BPM | WEIGHT: 152.8 LBS | BODY MASS INDEX: 22.56 KG/M2 | DIASTOLIC BLOOD PRESSURE: 70 MMHG | TEMPERATURE: 97.6 F | OXYGEN SATURATION: 99 % | SYSTOLIC BLOOD PRESSURE: 110 MMHG

## 2017-04-12 DIAGNOSIS — R19.7 DIARRHEA, UNSPECIFIED TYPE: Primary | ICD-10-CM

## 2017-04-12 DIAGNOSIS — I10 ESSENTIAL HYPERTENSION WITH GOAL BLOOD PRESSURE LESS THAN 140/90: ICD-10-CM

## 2017-04-12 DIAGNOSIS — E86.0 DEHYDRATION: ICD-10-CM

## 2017-04-12 DIAGNOSIS — R55 SYNCOPE, UNSPECIFIED SYNCOPE TYPE: ICD-10-CM

## 2017-04-12 DIAGNOSIS — S06.0X9A CONCUSSION WITH LOSS OF CONSCIOUSNESS, UNSPECIFIED DURATION, INITIAL ENCOUNTER: ICD-10-CM

## 2017-04-12 PROCEDURE — 99214 OFFICE O/P EST MOD 30 MIN: CPT | Performed by: FAMILY MEDICINE

## 2017-04-12 NOTE — MR AVS SNAPSHOT
After Visit Summary   4/12/2017    Jolynn Haji    MRN: [id number]           Patient Information     Date Of Birth          1955        Visit Information        Provider Department      4/12/2017 2:30 PM Helga Ibarra MD [facility] [place]        Today's Diagnoses     Diarrhea, unspecified type    -  1    Syncope, unspecified syncope type        Concussion with loss of consciousness, unspecified duration, initial encounter        Dehydration          Care Instructions                   The 'BRAT' diet (bananas, rice, applesauce, toast (no butter)  is suggested, then progress to diet as tolerated as symptoms michelle. Please , call if bloody stools, persistent diarrhea, vomiting, fever or abdominal pain.      For really loose stools w/ abdominal cramping : try imodium AD per over the counter box directions.     For increased abdominal gas/bloating : try simethicone (generic for Gas-x) per over the counter box directions.     Please, call or return to clinic or go to the ER immediately if signs or symptoms worsen or fail to improve as anticipated.       Concussion  What is a concussion?   A concussion is an injury to the brain caused by a blow to the head. A concussion may cause you to become temporarily confused or disoriented, have memory loss (amnesia), or become unconscious.   Concussions are the most common head injuries in sports.   How does it occur?   A concussion occurs when a blow to the head causes shaking, jarring, stretching, swelling, or tearing of brain tissue and delicate nerve fibers.   What are the symptoms?   If you have had a concussion you may have any of the following symptoms:   headache   confusion   memory loss (amnesia)   loss of consciousness   sleepiness   nausea or vomiting   trouble thinking   dizziness   weakness   seizures   loss of balance   blurred vision   sensitivity to light   You may have these symptoms for several days, weeks, or longer after the  injury. This is called post-concussive syndrome.   If your neck hurts after a head injury, it is best to try not to move more than is necessary until it is checked by a healthcare provider. Anyone with a possibly serious neck injury should not move at all and an ambulance should be called.   How is it diagnosed?   Your healthcare provider will examine you and find out what happened. If you can't remember what happened, he or she may need to get this information from other people saw the accident. Your healthcare provider will:   do a neurologic exam   test your strength   test your memory   test your sensation, balance, and reflexes   check your vision   look at your eyes with a flashlight to see if your pupils are the same size   You may be tested again several times during the next hour to check for any worsening of brain function, which might occur if you have any bleeding or swelling in the brain.   Your provider may do a CT scan or an MRI of your head to be sure there is no damage to your brain. Depending on how your head injury occurred, you may have neck X-rays to check your spine.   How is it treated?   The treatment for a concussion is rest. This means you may need to miss school, work, or other activities. Exercising too soon will make your symptoms last longer and may cause more problems. Limit activities that require thinking and concentration, such as, working on a computer or playing video games. Your brain needs to rest.   Headache may be treated with a mild pain reliever, such as acetaminophen. Nausea may be treated with a prescription medicine. Clear liquids or bland foods may be helpful.   If you have had a concussion, you need to be watched by a friend or relative for 8 to 12 hours. You should be awakened and checked every 2 to 4 hours while sleeping. Symptoms to report to your healthcare provider include:   confusion   seizures   unequal pupil sizes   restlessness or irritability   trouble using  your legs or arms   worsening vomiting   headache that will not go away after taking acetaminophen (Tylenol)   garbled speech   bleeding from the ears or nose   decreasing alertness   unusual sleepiness or hard to wake up   a change in personality   If you are stable and recovering during the next 24 hours, you should rest for an another day or two. As your symptoms go away, you can begin to go back to your usual daily routine. However, you should stay away from any activities that would risk reinjury. A second concussion before the first one has healed could be very serious. Your healthcare provider will tell you when it is safe to return to sports and other activities.   How can I prevent a concussion?   It is very important to understand that receiving a second blow to the head before the first injury is fully healed can be fatal, even if the second injury seems minor.   Using proper equipment (such as helmets and seat belts) and following proper techniques in sports such as football and soccer are the best ways to prevent concussions.     Published by [employer].  This content is reviewed periodically and is subject to change as new health information becomes available. The information is intended to inform and educate and is not a replacement for medical evaluation, advice, diagnosis or treatment by a healthcare professional.   Written by Orion Aleman MD, and Tamanna Mark MD, for [employer].   ? 2010 [facility] and/or its affiliates. All Rights Reserved.   Copyright   Clinical Reference Systems 2011    Graduated return to play protocol after concussion  Rehabilitation stage  Functional exercise at each stage of rehabilitation  Objective of each stage    1. No activity Complete physical and cognitive rest Recovery   2. Light aerobic exercise Walking, swimming or stationary cycling keeping intensity <70 percent HR; no resistance training Increase HR   3. Sport-specific exercise Skating drills in ice  hockey, running drills in soccer; no head impact activities Add movement   4. Non-contact training drills Progression to more complex training drills, eg, passing drills in football and ice hockey; may start progressive resistance training Exercise, coordination, and cognitive load   5. Full contact practice Following medical clearance, participate in normal training activities Restore confidence and assess functional skills by coaching staff   6. Return to play Normal game play    Six-day return to play protocol. Each day the athlete makes a stepwise increase in functional activity, is evaluated for symptoms, and is allowed to progress to the next stage each successive day if asymptomatic.   Clin J Sport Med 2009; 19:185. Copyright   2009 [employer].      DIARRHEA, uncertain cause (Adult, Report Pending)    Diarrhea has several possible causes. Common  stomach flu  is caused by a virus. Food poisoning, bacteria or parasites are other causes for diarrhea. Only diarrhea caused by bacteria or parasites requires treatment with an antibiotic. Diarrhea from a virus or food poisoning improves with simple home treatment.  A stool sample is needed to make the diagnosis of an infection with bacteria or parasites. Up to three stool specimens may be required to diagnose This may take up to two days to get the result. It may be necessary to wait until the stool test is complete to make the diagnosis and select the best antibiotic to prescribe.  HOME CARE:    If symptoms are severe, rest at home for the next 24 hours or until you are feeling better.    You may use acetaminophen (Tylenol) or ibuprofen (Motrin, Advil) to control fever, unless another medicine was prescribed. [NOTE: If you have chronic liver or kidney disease or ever had a stomach ulcer or GI bleeding, talk with your doctor before using these medicines.] (Aspirin should never be used in anyone under 18 years of age who is ill with a fever. It may  cause severe liver damage.)    Avoid tobacco, caffeine and alcohol, which may worsen your symptoms.    If anti-diarrhea medicine was prescribed, take this only as directed. Sometimes anti-diarrhea medicine can make your condition worse if the cause is an infectious diarrhea. Therefore, anti-diarrhea medicine should not be taken for this condition unless advised by your doctor.  DURING THE FIRST 12-24 HOURS follow the diet below:    BEVERAGES: Sport drinks like Gatorade, soft drinks without caffeine; ginger ale, mineral water (plain or flavored), decaffeinated tea and coffee.    SOUPS: Clear broth, consommé and bouillon    DESSERTS: Plain gelatin (Jell-O), popsicles and fruit juice bars.  DURING THE NEXT 24 HOURS you may add the following to the above:    Hot cereal, plain toast, bread, rolls, crackers    Plain noodles, rice, mashed potatoes, chicken noodle or rice soup    Unsweetened canned fruit (avoid pineapple), bananas    Limit fat intake to less than 15 grams per day by avoiding margarine, butter, oils, mayonnaise, sauces, gravies, fried foods, peanut butter, meat, poultry and fish.    Limit fiber; avoid raw or cooked vegetables, fresh fruits (except bananas) and bran cereals.    Limit caffeine and chocolate. No spices or seasonings except salt.  DURING THE NEXT 24 HOURS  Gradually resume a normal diet, as you feel better and your symptoms lessen.  FOLLOW UP with your doctor or as advised if you are not improving over the next two days. If you were asked to bring a specimen from home, bring the sample on the day of collection. You may call in 2 days (or as directed) for the results.  GET PROMPT MEDICAL ATTENTION if any of the following occur:    Increasing abdominal pain or constant lower right abdominal pain    Continued vomiting (unable to keep liquids down)    Frequent diarrhea (more than 5 times a day)    Blood in vomit or stool (black or red color)    Reduced oral intake    Dark urine, reduced urine  output    Weakness, dizziness, fainting    Drowsiness, confusion, stiff neck or seizure    Fever of 100.4 F (38 C) oral or higher, not better with fever medication    New rash    [id number] Annmarie Hercules, [address] [place] [address], [place] [address]. All rights reserved. This information is not intended as a substitute for professional medical care. Always follow your healthcare professional's instructions.    Please, call or return to clinic or go to the ER immediately if signs or symptoms worsen or fail to improve as anticipated.                Thank you for choosing [facility]  for your Health Care. It was a pleasure seeing you at your visit today. Please contact us with any questions or concerns you may have.                   Helga Ibarra MD                                  To reach your [facility] care team after hours call:   [phone]    Our clinic hours are:     Monday- 7:30 am - 7:00 pm                             Tuesday through Friday- 7:30 am - 5:00 pm                                        Saturday- 8:00 am - 12:00 pm                  Phone:  [phone]    Our pharmacy hours are:     Monday  8:00 am to 7:00 pm      Tuesday through Friday 8:00am to 6:00pm                        Saturday - 9:00 am to 1:00 pm      Sunday : Closed.              Phone:  [phone]      There is also information available at our web site:  [email or web address]    If your provider ordered any lab tests and you do not receive the results within 10 business days, please call the clinic.    If you need a medication refill please contact your pharmacy.  Please allow 2 business days for your refill to be completed.    Our clinic offers telephone visits and e visits.  Please ask one of your team members to explain more.      Use [employer] (secure email communication and access to your chart) to send your primary care provider a message or make an appointment. Ask someone on your Team  "how to sign up for [employer].                       Follow-ups after your visit        Your next 10 appointments already scheduled     Apr 27, 2017  8:15 AM CDT   PHYSICAL with Helga Ibarra MD   [facility]    [address] [place] [id number]   [phone]              Future tests that were ordered for you today     Open Future Orders        Priority Expected Expires Ordered    Enteric Bacteria and Virus Panel by [employer]ON[employer]IO Stool Routine  4/12/2018 4/12/2017    Fecal Lactoferrin Routine  4/12/2018 4/12/2017    Giardia antigen Routine  4/12/2018 4/12/2017    Clostridium difficile toxin B PCR Routine 4/13/2017 5/12/2017 4/12/2017            Who to contact     If you have questions or need follow up information about today's clinic visit or your schedule please contact [facility] directly at [phone].  Normal or non-critical lab and imaging results will be communicated to you by MyChart, letter or phone within 4 business days after the clinic has received the results. If you do not hear from us within 7 days, please contact the clinic through [employer] or phone. If you have a critical or abnormal lab result, we will notify you by phone as soon as possible.  Submit refill requests through [employer] or call your pharmacy and they will forward the refill request to us. Please allow 3 business days for your refill to be completed.          Additional Information About Your Visit        [email or web address] Information     [employer] lets you send messages to your doctor, view your test results, renew your prescriptions, schedule appointments and more. To sign up, go to [email or web address] . Click on \"Log in\" on the left side of the screen, which will take you to the Welcome page. Then click on \"Sign up Now\" on the right side of the page.     You will be asked to enter the access code listed below, as well as some personal information. Please " follow the directions to create your username and password.     Your access code is: [email or web address]  Expires: 2017  3:30 PM     Your access code will  in 90 days. If you need help or a new code, please call your [place] clinic or [phone].        Care EveryWhere ID     This is your Care EveryWhere ID. This could be used by other organizations to access your [facility] medical records  [id number]        Your Vitals Were     Pulse Temperature Last Period Pulse Oximetry BMI (Body Mass Index)       114 97.6  F (36.4  C) (Oral) 2001 99% 22.56 kg/m2        Blood Pressure from Last 3 Encounters:   17 150/84   17 136/70   17 126/86    Weight from Last 3 Encounters:   17 152 lb 12.8 oz (69.3 kg)   17 163 lb (73.9 kg)   17 163 lb 13 oz (74.3 kg)               Primary Care Provider Office Phone # Fax #    Helga Ibarra [phone]       [address] [address]        Thank you!     Thank you for choosing [facility]  for your care. Our goal is always to provide you with excellent care. Hearing back from our patients is one way we can continue to improve our services. Please take a few minutes to complete the written survey that you may receive in the mail after your visit with us. Thank you!             Your Updated Medication List - Protect others around you: Learn how to safely use, store and throw away your medicines at www.disposemymeds.org.          This list is accurate as of: 17  3:35 PM.  Always use your most recent med list.                   Brand Name Dispense Instructions for use    albuterol 108 (90 BASE) MCG/ACT Inhaler    PROAIR HFA/PROVENTIL HFA/VENTOLIN HFA    1 Inhaler    Inhale 2 puffs into the lungs every 6 hours as needed for shortness of breath / dyspnea       fluticasone 110 MCG/ACT Inhaler    FLOVENT HFA    1 Inhaler    Inhale 2 puffs into the lungs 2 times  daily 2 puffs       fluticasone 50 MCG/ACT spray    FLONASE    1 Bottle    Spray 1-2 sprays into both nostrils daily       levofloxacin 500 MG tablet    LEVAQUIN    21 tablet    Take 1 tablet (500 mg) by mouth daily       * lisinopril 2.5 MG tablet    PRINIVIL/Zestril    90 tablet    TAKE ONE TABLET BY MOUTH EVERY DAY       * lisinopril 5 MG tablet    PRINIVIL/ZESTRIL    30 tablet    Take 1 tablet (5 mg) by mouth daily       loratadine 10 MG capsule     30 capsule    Take 10 mg by mouth daily       metroNIDAZOLE 0.75 % cream    METROCREAM    45 g    APPLY TOPICALLY TWO TIMES A DAY TO FACE       * Notice:  This list has 2 medication(s) that are the same as other medications prescribed for you. Read the directions carefully, and ask your doctor or other care provider to review them with you.

## 2017-04-12 NOTE — NURSING NOTE
"Chief Complaint   Patient presents with     Diarrhea       Initial /84 (BP Location: Right arm, Patient Position: Chair, Cuff Size: Adult Regular)  Pulse 114  Temp 97.6  F (36.4  C) (Oral)  Wt 152 lb 12.8 oz (69.3 kg)  LMP 06/28/2001  SpO2 99%  BMI 22.56 kg/m2 Estimated body mass index is 22.56 kg/(m^2) as calculated from the following:    Height as of 3/22/17: 5' 9\" (1.753 m).    Weight as of this encounter: 152 lb 12.8 oz (69.3 kg).  Medication Reconciliation: complete   Liane Escobedo CMA  "

## 2017-04-12 NOTE — LETTER
"             Clostridium Difficile Infection  What is Clostridium difficile infection?   Clostridium difficile is a type of bacteria that lives in the intestines. Healthy people can have some of these bacteria in their colon without getting sick. However, if you have too many clostridium difficile bacteria, they can damage the colon and cause a serious, even life threatening, infection.   These bacteria are also called C. difficile or C. diff.   How does it occur?   You may get a C. diff infection if you have been taking antibiotics for a while. Different antibiotics kill different kinds of bacteria. Antibiotics can upset the natural balance of \"good\" and \"bad\" bacteria in your gut. When an antibiotic kills 1 type of bacteria, this may let too many other bacteria grow in your gut. If you have too many C. difficile bacteria you may develop an infection.   You are also more likely to get a C. diff infection if:   You have a health problem that weakens your immune system, such as diabetes or cancer.   You are 65 or older.   You live in a long-term care facility.   You have a history of colon problems, such as colorectal cancer or colitis.   What are the symptoms?   The most common symptoms of C. diff infection are   watery diarrhea, which may be bloody   fever   loss of appetite   nausea   belly pain and tenderness.   How is it diagnosed?   The infection is diagnosed with tests of bowel movement samples. The tests can show the kind of bacteria causing the infection. Tests called cultures can show which antibiotics are best to treat the infection.   You may have the following tests to see if the infection is hurting your colon:   special X-rays, such as a CT scan of your belly   sigmoidoscopy or colonoscopy, which uses a thin, flexible lighted tube to look at the lining inside the colon.   How is it treated?   Even though antibiotics may have caused the C. diff infection, you will need more antibiotics to get rid of it. " C. diff infections are usually treated with metronidazole or vancomycin. However, some strains of C. difficile are getting resistant to these antibiotics. This means that the medicines no longer kill the bacteria. Your provider will use culture test results to find an antibiotic that will work.   If the lining of your colon has been badly damaged by the infection, you may need surgery to remove the injured part of the colon.   If you are hospitalized, you may need to stay by yourself in a single room. This is to help make sure the bacteria stay in your room and don't contaminate or infect other people.   What are the complications?   A C. diff infection can cause your body to lose too much fluid (dehydration). The bacteria can also cause:   colitis, which is inflammation of the colon   pseudomembranous colitis, which is damage to the lining of the colon.   How can I help take care of myself?   Follow your healthcare provider's recommendations carefully. Take your medicine exactly as it is prescribed. Do not take half doses and do not stop before you have taken all of the medicine.   If you have diarrhea:   Eat starchy foods, such as potatoes, bread, pasta, bananas, and boiled vegetables.   Avoid caffeine.   Drink apple juice or other nonacidic juices. Avoid citrus drinks.   Ask your provider if you should take acetaminophen for your fever and stomach pain.   If your diarrhea is bloody, don't take aspirin or other anti-inflammatory medicine, such as ibuprofen and naproxen. These medicines can cause stomach problems, as well as other problems. They can make the bleeding worse. These risks increase with age. If you are taking these medicines, read the label and take as directed. Unless recommended by your healthcare provider, do not take for more than 10 days for any reason.   Be sure you drink plenty of liquids, especially if you have diarrhea.   Clean your hands often. When you are at home, the people who live with  you should also clean their hands often.   If you are at home, you should call your healthcare provider right away if:   Your fever is getting higher.   Your fever stays at 100.5?F (38?C) or higher for more than 2 days after you start treatment.   Your diarrhea is getting more frequent.   You have more blood in your diarrhea.   Your stomach cramps and pain are getting worse instead of better.   You are too sick to drink enough liquids.   You are making very little urine and the urine is dark yellow.   If you have any questions about your symptoms, call your healthcare provider.   How can I prevent C. difficile infection?   If you have C. difficile, you can avoid passing it to others by cleaning your hands well with soap and water or an alcohol-based hand rub. Everyone who comes into your room must also clean their hands before and after seeing you. This includes doctors, nurses, other staff, and visitors.   If you are being treated at the hospital for C. diff infection, you may need to stay in your room. If you are allowed to leave your room, you should not go to common areas, such as the gift shop or cafeteria. You can go to other areas of the hospital for treatments and tests. Usually the staff caring for you will wear protective gowns and gloves, which they will take off before leaving your room. Visitors may also need to wear gloves and a gown over their clothing. The housekeeping staff must also use gowns and gloves when entering your room and should clean all surfaces in the hospital room, including the bedrails and the telephone.   If you do not have C. difficile but want to avoid getting it, you should:   Wash your hands every time you use the bathroom and every time before you have a snack or eat a meal.   Take antibiotics only when necessary and as prescribed by your healthcare provider.   If you are being treated at a hospital, make sure that all doctors, nurses, and other healthcare providers clean their  hands with soap and water or an alcohol-based hand rub before and after caring for you. If you do not see your providers clean their hands, ask them to do so.   If you are at high risk for infection, you may be given medicine that may help you have a healthy balance of bacteria in your colon.     Published by [employer].  This content is reviewed periodically and is subject to change as new health information becomes available. The information is intended to inform and educate and is not a replacement for medical evaluation, advice, diagnosis or treatment by a healthcare professional.   Developed by [employer].   ? 2010 [facility] and/or its affiliates. All Rights Reserved.   Copyright   Clinical Reference Systems 2011  Adult Health Advisor

## 2017-04-12 NOTE — TELEPHONE ENCOUNTER
Diarrhea     Onset: 3 days    Description:   Consistency of stool: watery and runny  Blood in stool: no   Number of loose stools in past 24 hours: 10    Progression of Symptoms:  improving    Accompanying Signs & Symptoms:  Fever: no   Nausea or vomiting; no   Abdominal pain: no   Episodes of constipation: no   Weight loss: no    History:   Ill contacts: no   Recent use of antibiotics: YES   Recent travels: no          Recent medication-new or changes(Rx or OTC): no     Precipitating factors:   Was on levaquin    Alleviating factors:   none  Pt had passed out on sunday     Therapies Tried and outcome:  None    Pt advised if any dizziness or increased diarrhea to go to ER.    If not pt to be seen here.    The patient indicates understanding of these issues and agrees with the plan.    Paula Bertrand RN    [facility]

## 2017-04-12 NOTE — PROGRESS NOTES
SUBJECTIVE:                                                    Jolynn Haji is a 61 year old female who presents to clinic today for the following health issues: here with her , Zach today.     Diarrhea     Onset: 3 days    Description:   Consistency of stool: watery and runny  Blood in stool: no   Number of loose stools in past 24 hours: 10    Progression of Symptoms: improving    Accompanying Signs & Symptoms:  Fever: no   Nausea or vomiting; no   Abdominal pain: no   Episodes of constipation: no   Weight loss: no    History:   Ill contacts: no   Recent use of antibiotics: YES - on Levaquin for 21 days starting on 3/22/2107.   Recent travels: no    Recent medication-new or changes(Rx or OTC): no     Precipitating factors:   Was on levaquin    Alleviating factors:   none  Pt had passed out on Sunday 4/9/2017 pm  after 10 diarrheal stools the previous 1.5 days.     Lost consciousness for 1 minute or so., hit her head on the carpet hit ight shoulder , then right temple area. Did vomit just after that when she got back to bed. Has had a mild headache since she hit her head  = back of head as well.     The next day , Monday, felt really weak , stayed in bed. Then yesterday, Tuesday 4/11/2017, started to have really watery stools again.      Stopped levaquin 3 days ago. Was taking a probiotic with it.  Ate yogurt with it as well.     Has been trying liquids - mostly water - by mouth.  Had some toast today.     Some cramping and gurgly feeling.     Has lost 11 pounds in the last 2 weeks.   Wt Readings from Last 5 Encounters:   04/12/17 152 lb 12.8 oz (69.3 kg)   03/22/17 163 lb (73.9 kg)   03/13/17 163 lb 13 oz (74.3 kg)   03/09/17 163 lb 12.8 oz (74.3 kg)   04/21/16 163 lb 12.8 oz (74.3 kg)            Therapies Tried and outcome:  None.     Patient Active Problem List   Diagnosis     Essential hypertension with goal blood pressure less than 140/90     Hidradenitis     CELIAC DISEASE- family hx of      Localized  osteoarthritis of hand     Synovial cyst of popliteal space     DERMATOPHYTOSIS OF NAIL- toenails      Lipidosis     Osteopenia     Postmenopausal     NUMBNESS AND TINGLING OF FOOT - bilateral -mild      CARDIOVASCULAR SCREENING; LDL GOAL LESS THAN 160     Numbness of feet- distal feet R>L      Advanced directives, counseling/discussion     Stress incontinence, female - mild     Cough     Acute bronchitis- recurrent- ? related to mold in school building- pt has since retired and no further bronchitis     Rosacea     History of migraine headaches- assoc. with nausea/vomiting - resolved around menopause - were  premenstrual      Dupuytren's disease of palm - right 4th digit flexor tendon     Sun-damaged skin     Seasonal allergic rhinitis     Basal cell carcinoma of leg, right       Current Outpatient Prescriptions   Medication Sig Dispense Refill     levofloxacin (LEVAQUIN) 500 MG tablet Take 1 tablet (500 mg) by mouth daily 21 tablet 0     lisinopril (PRINIVIL/ZESTRIL) 5 MG tablet Take 1 tablet (5 mg) by mouth daily 30 tablet 1     fluticasone (FLONASE) 50 MCG/ACT spray Spray 1-2 sprays into both nostrils daily 1 Bottle 11     lisinopril (PRINIVIL,ZESTRIL) 2.5 MG tablet TAKE ONE TABLET BY MOUTH EVERY DAY 90 tablet 1     loratadine 10 MG capsule Take 10 mg by mouth daily 30 capsule      albuterol (PROAIR HFA, PROVENTIL HFA, VENTOLIN HFA) 108 (90 BASE) MCG/ACT inhaler Inhale 2 puffs into the lungs every 6 hours as needed for shortness of breath / dyspnea 1 Inhaler 5     fluticasone (FLOVENT HFA) 110 MCG/ACT inhaler Inhale 2 puffs into the lungs 2 times daily 2 puffs 1 Inhaler 12     metroNIDAZOLE (METROCREAM) 0.75 % cream APPLY TOPICALLY TWO TIMES A DAY TO FACE 45 g 5          Allergies   Allergen Reactions     Augmentin Itching     itching, rash     Fosamax Diarrhea     Diarrhea, stomach cramps, jaw pain        Problem list and histories reviewed & adjusted, as indicated.  Additional history: as documented.      Reviewed and updated as needed this visit by clinical staff  Tobacco  Allergies  Meds  Med Hx  Surg Hx  Fam Hx  Soc Hx      Reviewed and updated as needed this visit by Provider         ROS:   ROS: 12 point ROS neg other than the symptoms noted above    OBJECTIVE:                                                    /84 (BP Location: Right arm, Patient Position: Chair, Cuff Size: Adult Regular)  Pulse 114  Temp 97.6  F (36.4  C) (Oral)  Wt 152 lb 12.8 oz (69.3 kg)  LMP 06/28/2001  SpO2 99%  BMI 22.56 kg/m2  Body mass index is 22.56 kg/(m^2).   GENERAL: healthy, alert, well nourished, well hydrated, no distress  HENT: ear canals- normal; TMs- normal; Nose- normal; Mouth- no ulcers, no lesions  NECK: no tenderness, no adenopathy, no asymmetry, no masses, no stiffness; thyroid- normal to palpation  RESP: lungs clear to auscultation - no rales, no rhonchi, no wheezes  CV: regular rates and rhythm, normal S1 S2, no S3 or S4 and no murmur, no click or rub -  ABDOMEN: soft, no tenderness, no  hepatosplenomegaly, no masses, mildly hyperactive bowel sounds.   MS: extremities- no gross deformities noted, no edema.     Diagnostic test results:  See [facility] orders.      ASSESSMENT/PLAN:                                                        ICD-10-CM    1. Diarrhea, unspecified type R19.7 Enteric Bacteria and Virus Panel by [facility] Stool     Fecal Lactoferrin     Giardia antigen     Clostridium difficile toxin B PCR   2. Syncope, unspecified syncope type R55    3. Concussion with loss of consciousness, unspecified duration, initial encounter S06.0X9A    4. Dehydration E86.0    5. Essential hypertension with goal blood pressure less than 140/90 I10      Rest, rest, rest.     See Patient Instructions. Now chronic sinusitis pain gone with the above levaquin.     See letters section for C diff information as well.     The 'BRAT' diet (bananas, rice, applesauce, toast (no butter)  is suggested, then progress to diet  as tolerated as symptoms michelle. Please , call if bloody stools, persistent diarrhea, vomiting, fever or abdominal pain.      For really loose stools w/ abdominal cramping : try imodium AD per over the counter box directions.     For increased abdominal gas/bloating : try simethicone (generic for Gas-x) per over the counter box directions.     Please, call or return to clinic or go to the ER immediately if signs or symptoms worsen or fail to improve as anticipated.             Helga Ibarra MD    [facility]

## 2017-04-12 NOTE — PATIENT INSTRUCTIONS
The 'BRAT' diet (bananas, rice, applesauce, toast (no butter)  is suggested, then progress to diet as tolerated as symptoms michelle. Please , call if bloody stools, persistent diarrhea, vomiting, fever or abdominal pain.      For really loose stools w/ abdominal cramping : try imodium AD per over the counter box directions.     For increased abdominal gas/bloating : try simethicone (generic for Gas-x) per over the counter box directions.     Please, call or return to clinic or go to the ER immediately if signs or symptoms worsen or fail to improve as anticipated.       Concussion  What is a concussion?   A concussion is an injury to the brain caused by a blow to the head. A concussion may cause you to become temporarily confused or disoriented, have memory loss (amnesia), or become unconscious.   Concussions are the most common head injuries in sports.   How does it occur?   A concussion occurs when a blow to the head causes shaking, jarring, stretching, swelling, or tearing of brain tissue and delicate nerve fibers.   What are the symptoms?   If you have had a concussion you may have any of the following symptoms:   headache   confusion   memory loss (amnesia)   loss of consciousness   sleepiness   nausea or vomiting   trouble thinking   dizziness   weakness   seizures   loss of balance   blurred vision   sensitivity to light   You may have these symptoms for several days, weeks, or longer after the injury. This is called post-concussive syndrome.   If your neck hurts after a head injury, it is best to try not to move more than is necessary until it is checked by a healthcare provider. Anyone with a possibly serious neck injury should not move at all and an ambulance should be called.   How is it diagnosed?   Your healthcare provider will examine you and find out what happened. If you can't remember what happened, he or she may need to get this information from other people saw the accident. Your  healthcare provider will:   do a neurologic exam   test your strength   test your memory   test your sensation, balance, and reflexes   check your vision   look at your eyes with a flashlight to see if your pupils are the same size   You may be tested again several times during the next hour to check for any worsening of brain function, which might occur if you have any bleeding or swelling in the brain.   Your provider may do a CT scan or an MRI of your head to be sure there is no damage to your brain. Depending on how your head injury occurred, you may have neck X-rays to check your spine.   How is it treated?   The treatment for a concussion is rest. This means you may need to miss school, work, or other activities. Exercising too soon will make your symptoms last longer and may cause more problems. Limit activities that require thinking and concentration, such as, working on a computer or playing video games. Your brain needs to rest.   Headache may be treated with a mild pain reliever, such as acetaminophen. Nausea may be treated with a prescription medicine. Clear liquids or bland foods may be helpful.   If you have had a concussion, you need to be watched by a friend or relative for 8 to 12 hours. You should be awakened and checked every 2 to 4 hours while sleeping. Symptoms to report to your healthcare provider include:   confusion   seizures   unequal pupil sizes   restlessness or irritability   trouble using your legs or arms   worsening vomiting   headache that will not go away after taking acetaminophen (Tylenol)   garbled speech   bleeding from the ears or nose   decreasing alertness   unusual sleepiness or hard to wake up   a change in personality   If you are stable and recovering during the next 24 hours, you should rest for an another day or two. As your symptoms go away, you can begin to go back to your usual daily routine. However, you should stay away from any activities that would risk reinjury.  A second concussion before the first one has healed could be very serious. Your healthcare provider will tell you when it is safe to return to sports and other activities.   How can I prevent a concussion?   It is very important to understand that receiving a second blow to the head before the first injury is fully healed can be fatal, even if the second injury seems minor.   Using proper equipment (such as helmets and seat belts) and following proper techniques in sports such as football and soccer are the best ways to prevent concussions.     Published by [employer].  This content is reviewed periodically and is subject to change as new health information becomes available. The information is intended to inform and educate and is not a replacement for medical evaluation, advice, diagnosis or treatment by a healthcare professional.   Written by Orion Aleman MD, and Tamanna Mark MD, for [employer].   ? 2010 [facility] and/or its affiliates. All Rights Reserved.   Copyright   [employer] 2011    Graduated return to play protocol after concussion  Rehabilitation stage  Functional exercise at each stage of rehabilitation  Objective of each stage    1. No activity Complete physical and cognitive rest Recovery   2. Light aerobic exercise Walking, swimming or stationary cycling keeping intensity <70 percent HR; no resistance training Increase HR   3. Sport-specific exercise Skating drills in ice hockey, running drills in soccer; no head impact activities Add movement   4. Non-contact training drills Progression to more complex training drills, eg, passing drills in football and ice hockey; may start progressive resistance training Exercise, coordination, and cognitive load   5. Full contact practice Following medical clearance, participate in normal training activities Restore confidence and assess functional skills by coaching staff   6. Return to play Normal game play    Six-day return to play  protocol. Each day the athlete makes a stepwise increase in functional activity, is evaluated for symptoms, and is allowed to progress to the next stage each successive day if asymptomatic.   Clin J Sport Med 2009; 19:185. Copyright   2009 [employer].      DIARRHEA, uncertain cause (Adult, Report Pending)    Diarrhea has several possible causes. Common  stomach flu  is caused by a virus. Food poisoning, bacteria or parasites are other causes for diarrhea. Only diarrhea caused by bacteria or parasites requires treatment with an antibiotic. Diarrhea from a virus or food poisoning improves with simple home treatment.  A stool sample is needed to make the diagnosis of an infection with bacteria or parasites. Up to three stool specimens may be required to diagnose This may take up to two days to get the result. It may be necessary to wait until the stool test is complete to make the diagnosis and select the best antibiotic to prescribe.  HOME CARE:    If symptoms are severe, rest at home for the next 24 hours or until you are feeling better.    You may use acetaminophen (Tylenol) or ibuprofen (Motrin, Advil) to control fever, unless another medicine was prescribed. [NOTE: If you have chronic liver or kidney disease or ever had a stomach ulcer or GI bleeding, talk with your doctor before using these medicines.] (Aspirin should never be used in anyone under 18 years of age who is ill with a fever. It may cause severe liver damage.)    Avoid tobacco, caffeine and alcohol, which may worsen your symptoms.    If anti-diarrhea medicine was prescribed, take this only as directed. Sometimes anti-diarrhea medicine can make your condition worse if the cause is an infectious diarrhea. Therefore, anti-diarrhea medicine should not be taken for this condition unless advised by your doctor.  DURING THE FIRST 12-24 HOURS follow the diet below:    BEVERAGES: Sport drinks like Gatorade, soft drinks without caffeine;  ginger ale, mineral water (plain or flavored), decaffeinated tea and coffee.    SOUPS: Clear broth, consommé and bouillon    DESSERTS: Plain gelatin (Jell-O), popsicles and fruit juice bars.  DURING THE NEXT 24 HOURS you may add the following to the above:    Hot cereal, plain toast, bread, rolls, crackers    Plain noodles, rice, mashed potatoes, chicken noodle or rice soup    Unsweetened canned fruit (avoid pineapple), bananas    Limit fat intake to less than 15 grams per day by avoiding margarine, butter, oils, mayonnaise, sauces, gravies, fried foods, peanut butter, meat, poultry and fish.    Limit fiber; avoid raw or cooked vegetables, fresh fruits (except bananas) and bran cereals.    Limit caffeine and chocolate. No spices or seasonings except salt.  DURING THE NEXT 24 HOURS  Gradually resume a normal diet, as you feel better and your symptoms lessen.  FOLLOW UP with your doctor or as advised if you are not improving over the next two days. If you were asked to bring a specimen from home, bring the sample on the day of collection. You may call in 2 days (or as directed) for the results.  GET PROMPT MEDICAL ATTENTION if any of the following occur:    Increasing abdominal pain or constant lower right abdominal pain    Continued vomiting (unable to keep liquids down)    Frequent diarrhea (more than 5 times a day)    Blood in vomit or stool (black or red color)    Reduced oral intake    Dark urine, reduced urine output    Weakness, dizziness, fainting    Drowsiness, confusion, stiff neck or seizure    Fever of 100.4 F (38 C) oral or higher, not better with fever medication    [employer]    [id number] [facility], [address] [place] [address], [place] [address]. All rights reserved. This information is not intended as a substitute for professional medical care. Always follow your healthcare professional's instructions.    Please, call or return to clinic or go to the ER immediately if signs or symptoms worsen or fail to  improve as anticipated.                Thank you for choosing [facility]  for your Health Care. It was a pleasure seeing you at your visit today. Please contact us with any questions or concerns you may have.                   Helga Ibarra MD                                  To reach your [facility] care team after hours call:   [phone]    Our clinic hours are:     Monday- 7:30 am - 7:00 pm                             Tuesday through Friday- 7:30 am - 5:00 pm                                        Saturday- 8:00 am - 12:00 pm                  Phone:  [phone]    Our pharmacy hours are:     Monday  8:00 am to 7:00 pm      Tuesday through Friday 8:00am to 6:00pm                        Saturday - 9:00 am to 1:00 pm      Sunday : Closed.              Phone:  [phone]      There is also information available at our web site:  [email or web address]    If your provider ordered any lab tests and you do not receive the results within 10 business days, please call the clinic.    If you need a medication refill please contact your pharmacy.  Please allow 2 business days for your refill to be completed.    Our clinic offers telephone visits and e visits.  Please ask one of your team members to explain more.      Use [email or web address] (secure email communication and access to your chart) to send your primary care provider a message or make an appointment. Ask someone on your Team how to sign up for [employer].

## 2017-04-14 ENCOUNTER — HOSPITAL ENCOUNTER (EMERGENCY)
Facility: CLINIC | Age: 62
Discharge: HOME | End: 2017-04-14
Attending: EMERGENCY MEDICINE | Admitting: EMERGENCY MEDICINE
Payer: COMMERCIAL

## 2017-04-14 ENCOUNTER — TELEPHONE (OUTPATIENT)
Dept: FAMILY MEDICINE | Facility: CLINIC | Age: 62
End: 2017-04-14

## 2017-04-14 ENCOUNTER — APPOINTMENT (OUTPATIENT)
Dept: CT IMAGING | Facility: CLINIC | Age: 62
End: 2017-04-14
Attending: EMERGENCY MEDICINE
Payer: COMMERCIAL

## 2017-04-14 VITALS
TEMPERATURE: 99.1 F | OXYGEN SATURATION: 99 % | HEART RATE: 83 BPM | RESPIRATION RATE: 16 BRPM | DIASTOLIC BLOOD PRESSURE: 94 MMHG | SYSTOLIC BLOOD PRESSURE: 141 MMHG

## 2017-04-14 DIAGNOSIS — S09.90XA CLOSED HEAD INJURY, INITIAL ENCOUNTER: ICD-10-CM

## 2017-04-14 PROCEDURE — 99284 EMERGENCY DEPT VISIT MOD MDM: CPT | Mod: 25

## 2017-04-14 PROCEDURE — 70450 CT HEAD/BRAIN W/O DYE: CPT

## 2017-04-14 ASSESSMENT — ENCOUNTER SYMPTOMS: HEADACHES: 1

## 2017-04-14 NOTE — TELEPHONE ENCOUNTER
Pt calling     Stating she has no longer had diarrhea and is much better  Her stools are formed now - RN advised pt that if stools are formed we do not need to get a sample     Pt then asked about her concussion - she has been have flashes of lights out of her peripheral site and been having on and off severe headaches the last few days ( hit her head on Sunday and LOC) she has been having increased nausea     Rn advised that she get a  and go to the ER     Patient stated an understanding and agreed with plan.    Shani Bunch RN, BSN  [facility]

## 2017-04-14 NOTE — ED NOTES
"Patient passed out on Sunday (5 days ago) when she had diarrhea and hit her head on the carpet.  Seen at the clinic on Wednesday for diarrhea and possible concussion.  Today with continued concussion symptoms, \"seeing some light from the side and it happened last night. A bright light that goes in a rectangular pattern around my face and nausea\". Dizzy if she gets up too fast. Headaches.  "

## 2017-04-14 NOTE — ED AVS SNAPSHOT
[facility] Emergency Department    [address]    [facility] [address]    Phone:  [phone]    Fax:  [phone]                                       Jolynn Haji   MRN: [id number]    Department:  [facility] Emergency Department   Date of Visit:  4/14/2017           After Visit Summary Signature Page     I have received my discharge instructions, and my questions have been answered. I have discussed any challenges I see with this plan with the nurse or doctor.    ..........................................................................................................................................  Patient/Patient Representative Signature      ..........................................................................................................................................  Patient Representative Print Name and Relationship to Patient    ..................................................               ................................................  Date                                            Time    ..........................................................................................................................................  Reviewed by Signature/Title    ...................................................              ..............................................  Date                                                            Time

## 2017-04-14 NOTE — DISCHARGE INSTRUCTIONS
Concussion Discharge Instructions:  You were seen today for symptoms of a concussion. The symptoms and severity of a concussion are variable, depending on the nature of your injury and your health status.  Symptoms may include: headache, confusion, nausea, vomiting, memory or concentration issues, and problems with sleep. You may feel dizzy, irritable, and tired. Children and teens may need help from their parents, teachers, coaches and others to monitor their symptoms and recovery.     Follow-up  It is very important you have follow up for your concussion to assess your recovery.  Please see your primary doctor within the next 5-7 days for re-evaluation. You may have also been referred to the Concussion  service who will contact you and arrange an appropriate follow up appointment if you do not have a primary provider or have other needs.  If you need assistance sooner, you may call them directly at [phone].Warning signs  Call your doctor or come back to the Emergency Department if you suddenly have any   of these symptoms:    Headaches that get worse    Feeling more and more drowsy    You keep repeating yourself    Strange behavior    Seizures    Repeat vomiting (throwing up)    Trouble walking    Growing confusion    Feeling more irritable    Neck pain that gets worse    Slurred speech    Weakness or numbness    Loss of consciousness    Fluid or blood coming from ears/nose          Self-care    Get lots of rest. Be sure to get enough sleep at night.  Take daytime naps or rest if you feel tired.    Limit physical activity and  thinking  activities. These can make symptoms worse.  - Physical activity includes gym, sports, weight training, running, exercise and heavy lifting.  - Thinking activities include homework, class work, job-related work, and screen time (phone, computer, tablet and TV use, especially video games)    Maintain a healthy diet and drink lots of fluids.      As symptoms improve,  you may slowly return to your daily activities. If symptoms get worse   or return, reduce your activities.     Know that it is normal to feel sad and frustrated when you do not feel right and are less active.    Going back to work    Your care team will tell you when to return to work based on your symptoms.   Limit the amount of work you do soon after your injury. This may speed healing.   It is important to get a lot of rest and take breaks if your symptoms get worse. You should also avoid physical activity as well as activities that require a lot of thinking or concentration until you see your doctor.  You may need shorter work days, a reduced workload and responsibilities.  Avoid heavy lifting, working with machinery, driving and working at heights until your symptoms resolve or you are cleared by a doctor.Returning to sports    Never return to play if you have any symptoms. A full recovery will reduce the chances of getting hurt again. Remember, it is better to miss one or two games than a whole season.     You should rest from all physical activity until you see your doctor. Generally, if all symptoms have completely cleared, your doctor can help guide you to slowly resume activities.  If symptoms return or worsen in any way, discontinue the activity and see your doctor*    *Important: If you are in an organized sport and under age 18, you will need written clearance by an appropriate healthcare provider prior to returning to sports; this will typically be your primary care and/or sports medicine physician.  Please make an appointment.    Going back to school    If you are still having symptoms, you may need extra help at school.    Tell your teachers and school nurse about your injury and symptoms. Ask them to watch for problems with learning, memory and concentration. Symptoms may get worse when you do schoolwork, and you may become more irritable.  You may need shorter school days, a reduced workload, and  to postpone school testing.  No driving or gym class (physical activity) until cleared by a doctor.

## 2017-04-14 NOTE — ED PROVIDER NOTES
"  History     Chief Complaint:  Head Injury    HPI   Jolynn Haji is a 61 year old female who presents to the emergency department today for evaluation of head injury. The patient suffered a syncopal episode on Sunday 4/9 late at night when she was going to the restroom. The  found the patient on the carpet in their closet. She had hit her head at this time on carpet but did not come into the ED. She has no other injuries except for some minor bruises. On Wednesday she went to see her PCP who diagnosed her with a concussion. Last night she started seeing some lights in her right visual field. These \"flickering light\" symptoms continued today so she called her PCP who recommended she come into the ED for a CT scan. Of note, the patient states she has lost 10 pounds recently. She had been having diarrhea ever since starting Levaquin but has not had any episodes since Wednesday.     Allergies:  Augmentin  Fosamax     Medications:    Levaquin  Lisinopril  Flonase  Loratadine  Albuterol   Flovent  Metronidazole     Past Medical History:    Abnormal glandular papanicolaou smear of cervix  Basal cell carcinoma  Hidradenitis  High grade squamous intraepithelial lesion   Hypertension   Osteoarthritis  Shingles  Synovial cyst      Past Surgical History:    Colposcopy     Family History:    Mother-thyroid disease, hypertension, type 2 diabetes, leukemia  Father-heart disease, hypertension  Daughter-diabetes     Social History:  The patient was accompanied to the ED by .  Smoking Status: never smoker  Alcohol Use: positive, rarely    Marital Status:   [2]    Review of Systems   Eyes: Positive for visual disturbance.   Neurological: Positive for syncope and headaches.   All other systems reviewed and are negative.    Physical Exam   Vitals:  Patient Vitals for the past 24 hrs:   BP Temp Temp src Pulse Heart Rate Resp SpO2   04/14/17 1349 (!) 141/94 99.1  F (37.3  C) Temporal 83 83 16 99 %      Physical " Exam  General: Patient is alert and cooperative.  HENT: No external sign of trauma.   Eyes: EOMI, PERRLA.  Normal conjunctiva.  Neck: Normal range of motion. No tenderness.   Cardiovascular: Normal rate.  Pulmonary/Chest: Effort normal.   Abdominal: There is no tenderness.        Musculoskeletal: Normal range of motion.  No tenderness or sign of trauma.   Neurological: Patient  is alert and oriented.  Skin: Skin is warm and dry. No bruising.   Psychiatric: Patient has a normal mood and affect. Normal behavior and judgement.    Emergency Department Course     Imaging:  Radiology findings were communicated with the patient who voiced understanding of the findings.    Head CT w/o contrast   IMPRESSION:   1. No evidence of acute trauma.   2. Mild generalized atrophy of the brain.         TIEN JOHNS MD      Emergency Department Course:  Nursing notes and vitals reviewed.  I performed an exam of the patient as documented above.   The patient was sent for imaging per above while in the emergency department, results above.   I discussed the treatment plan with the patient. They expressed understanding of this plan and consented to discharge. They will be discharged home with instructions for care and follow up. In addition, the patient will return to the emergency department if their symptoms persist, worsen, if new symptoms arise or if there is any concern.  All questions were answered.   I personally reviewed the imaging results with the patient and answered all related questions prior to discharge.    Impression & Plan      Medical Decision Making:  Jolynn Haji is a 61 year old female referred by nurse line for CT scan head. She suffered a syncopal event 5 days ago during which time she did apparently experience a mild head injury. She was seen in a clinic a couple days later and clinically diagnosed with a concussion. Due to continuing symptoms and new developmental of episodic vision changes, she contacted a nurse  line and was directed here. She has normal neurologic examination and cervical spine is cleared clinically. CT head without contrast shows no acute abnormalities. Reassurance is provided. She can be safely discharged home and follow up with primary clinic should symptoms not gradually resolve.     Diagnosis:    ICD-10-CM    1. Closed head injury, initial encounter S09.90XA      Disposition:   Discharge     Scribe Disclosure:  I, Froy Solis, am serving as a scribe at 2:10 PM on 4/14/2017 to document services personally performed by Frank Arriaga MD, based on my observations and the provider's statements to me.   4/14/2017   [facility] EMERGENCY DEPARTMENT       Frank Arriaga MD  04/15/17 1046

## 2017-04-14 NOTE — ED AVS SNAPSHOT
[facility] Emergency Department    [address]    [facility] [address]    Phone:  [phone]    Fax:  [phone]                                       Jolynn Haji   MRN: [id number]    Department:  [facility] Emergency Department   Date of Visit:  4/14/2017           Patient Information     Date Of Birth          1955        Your diagnoses for this visit were:     Closed head injury, initial encounter        You were seen by Frank Arriaga MD.      Follow-up Information     Follow up with Helga Ibarra MD.    Specialty:  Family Practice    Why:  As needed    Contact information:    [facility]  [address] [facility] [address] [facility] [address] [phone]          Discharge Instructions       Concussion Discharge Instructions:  You were seen today for symptoms of a concussion. The symptoms and severity of a concussion are variable, depending on the nature of your injury and your health status.  Symptoms may include: headache, confusion, nausea, vomiting, memory or concentration issues, and problems with sleep. You may feel dizzy, irritable, and tired. Children and teens may need help from their parents, teachers, coaches and others to monitor their symptoms and recovery.     Follow-up  It is very important you have follow up for your concussion to assess your recovery.  Please see your primary doctor within the next 5-7 days for re-evaluation. You may have also been referred to the Concussion  service who will contact you and arrange an appropriate follow up appointment if you do not have a primary provider or have other needs.  If you need assistance sooner, you may call them directly at [phone].Warning signs  Call your doctor or come back to the Emergency Department if you suddenly have any   of these symptoms:    Headaches that get worse    Feeling more and more drowsy    You keep repeating yourself    Strange  behavior    Seizures    Repeat vomiting (throwing up)    Trouble walking    Growing confusion    Feeling more irritable    Neck pain that gets worse    Slurred speech    Weakness or numbness    Loss of consciousness    Fluid or blood coming from ears/nose          Self-care    Get lots of rest. Be sure to get enough sleep at night.  Take daytime naps or rest if you feel tired.    Limit physical activity and  thinking  activities. These can make symptoms worse.  - Physical activity includes gym, sports, weight training, running, exercise and heavy lifting.  - Thinking activities include homework, class work, job-related work, and screen time (phone, computer, tablet and TV use, especially video games)    Maintain a healthy diet and drink lots of fluids.      As symptoms improve, you may slowly return to your daily activities. If symptoms get worse   or return, reduce your activities.     Know that it is normal to feel sad and frustrated when you do not feel right and are less active.    Going back to work    Your care team will tell you when to return to work based on your symptoms.   Limit the amount of work you do soon after your injury. This may speed healing.   It is important to get a lot of rest and take breaks if your symptoms get worse. You should also avoid physical activity as well as activities that require a lot of thinking or concentration until you see your doctor.  You may need shorter work days, a reduced workload and responsibilities.  Avoid heavy lifting, working with machinery, driving and working at heights until your symptoms resolve or you are cleared by a doctor.Returning to sports    Never return to play if you have any symptoms. A full recovery will reduce the chances of getting hurt again. Remember, it is better to miss one or two games than a whole season.     You should rest from all physical activity until you see your doctor. Generally, if all symptoms have completely cleared, your  doctor can help guide you to slowly resume activities.  If symptoms return or worsen in any way, discontinue the activity and see your doctor*    *Important: If you are in an organized sport and under age 18, you will need written clearance by an appropriate healthcare provider prior to returning to sports; this will typically be your primary care and/or sports medicine physician.  Please make an appointment.    Going back to school    If you are still having symptoms, you may need extra help at school.    Tell your teachers and school nurse about your injury and symptoms. Ask them to watch for problems with learning, memory and concentration. Symptoms may get worse when you do schoolwork, and you may become more irritable.  You may need shorter school days, a reduced workload, and to postpone school testing.  No driving or gym class (physical activity) until cleared by a doctor.      Future Appointments        Provider Department Dept Phone Center    4/27/2017 8:15 AM Helga Ibarra MD [facility] [phone]       24 Hour Appointment Hotline       To make an appointment at any [facility], call [phone] ([phone]). If you don't have a family doctor or clinic, we will help you find one. [place] clinics are conveniently located to serve the needs of you and your family.             Review of your medicines      Our records show that you are taking the medicines listed below. If these are incorrect, please call your family doctor or clinic.        Dose / Directions Last dose taken    albuterol 108 (90 BASE) MCG/ACT Inhaler   Commonly known as:  PROAIR HFA/PROVENTIL HFA/VENTOLIN HFA   Dose:  2 puff   Quantity:  1 Inhaler        Inhale 2 puffs into the lungs every 6 hours as needed for shortness of breath / dyspnea   Refills:  5        fluticasone 110 MCG/ACT Inhaler   Commonly known as:  FLOVENT HFA   Dose:  2 puff   Quantity:  1 Inhaler        Inhale 2 puffs into the lungs  2 times daily 2 puffs   Refills:  12        fluticasone 50 MCG/ACT spray   Commonly known as:  FLONASE   Dose:  1-2 spray   Quantity:  1 Bottle        Spray 1-2 sprays into both nostrils daily   Refills:  11        levofloxacin 500 MG tablet   Commonly known as:  LEVAQUIN   Dose:  500 mg   Quantity:  21 tablet        Take 1 tablet (500 mg) by mouth daily   Refills:  0        * lisinopril 2.5 MG tablet   Commonly known as:  PRINIVIL/Zestril   Quantity:  90 tablet        TAKE ONE TABLET BY MOUTH EVERY DAY   Refills:  1        * lisinopril 5 MG tablet   Commonly known as:  PRINIVIL/ZESTRIL   Dose:  5 mg   Quantity:  30 tablet        Take 1 tablet (5 mg) by mouth daily   Refills:  1        loratadine 10 MG capsule   Dose:  10 mg   Quantity:  30 capsule        Take 10 mg by mouth daily   Refills:  0        metroNIDAZOLE 0.75 % cream   Commonly known as:  METROCREAM   Quantity:  45 g        APPLY TOPICALLY TWO TIMES A DAY TO FACE   Refills:  5        * Notice:  This list has 2 medication(s) that are the same as other medications prescribed for you. Read the directions carefully, and ask your doctor or other care provider to review them with you.            Procedures and tests performed during your visit     Head CT w/o contrast      Orders Needing Specimen Collection     None      Pending Results     No orders found from 4/12/2017 to 4/15/2017.            Pending Culture Results     No orders found from 4/12/2017 to 4/15/2017.            Test Results From Your Hospital Stay        4/14/2017  2:40 PM      Narrative     CT SCAN OF THE HEAD WITHOUT CONTRAST   4/14/2017 2:37 PM     HISTORY: Trauma.    TECHNIQUE:  Axial images of the head and coronal reformations without  IV contrast material. Radiation dose for this scan was reduced using  automated exposure control, adjustment of the mA and/or kV according  to patient size, or iterative reconstruction technique.    COMPARISON: None.    FINDINGS:  There is mild generalized  atrophy of the brain. There is no  evidence of intracranial hemorrhage, mass, acute infarct or anomaly.     The visualized portions of the sinuses and mastoids appear normal.  There is no evidence of trauma.        Impression     IMPRESSION:  1. No evidence of acute trauma.  2. Mild generalized atrophy of the brain.      TIEN JOHNS MD                Clinical Quality Measure: Blood Pressure Screening     Your blood pressure was checked while you were in the emergency department today. The last reading we obtained was  BP: (!) 141/94 . Please read the guidelines below about what these numbers mean and what you should do about them.  If your systolic blood pressure (the top number) is less than 120 and your diastolic blood pressure (the bottom number) is less than 80, then your blood pressure is normal. There is nothing more that you need to do about it.  If your systolic blood pressure (the top number) is 120-139 or your diastolic blood pressure (the bottom number) is 80-89, your blood pressure may be higher than it should be. You should have your blood pressure rechecked within a year by a primary care provider.  If your systolic blood pressure (the top number) is 140 or greater or your diastolic blood pressure (the bottom number) is 90 or greater, you may have high blood pressure. High blood pressure is treatable, but if left untreated over time it can put you at risk for heart attack, stroke, or kidney failure. You should have your blood pressure rechecked by a primary care provider within the next 4 weeks.  If your provider in the emergency department today gave you specific instructions to follow-up with your doctor or provider even sooner than that, you should follow that instruction and not wait for up to 4 weeks for your follow-up visit.        Thank you for choosing [facility]       Thank you for choosing [facility] for your care. Our goal is always to provide you with excellent care. Hearing back from our  "patients is one way we can continue to improve our services. Please take a few minutes to complete the written survey that you may receive in the mail after you visit with us. Thank you!        [employer] Information     [employer] lets you send messages to your doctor, view your test results, renew your prescriptions, schedule appointments and more. To sign up, go to [email or web address] . Click on \"Log in\" on the left side of the screen, which will take you to the Welcome page. Then click on \"Sign up Now\" on the right side of the page.     You will be asked to enter the access code listed below, as well as some personal information. Please follow the directions to create your username and password.     Your access code is: [email or web address]  Expires: 2017  3:30 PM     Your access code will  in 90 days. If you need help or a new code, please call your [facility] clinic or [phone].        Care EveryWhere ID     This is your Care EveryWhere ID. This could be used by other organizations to access your [facility] medical records  [id number]        After Visit Summary       This is your record. Keep this with you and show to your community pharmacist(s) and doctor(s) at your next visit.                  "

## 2017-04-23 ENCOUNTER — TELEPHONE (OUTPATIENT)
Dept: FAMILY MEDICINE | Facility: CLINIC | Age: 62
End: 2017-04-23

## 2017-04-23 NOTE — TELEPHONE ENCOUNTER
Patient's spouse called stating that they would like a call back. Did not specify as to why.    Yuniel MARIE  Central Scheduling

## 2017-04-24 ENCOUNTER — OFFICE VISIT (OUTPATIENT)
Dept: FAMILY MEDICINE | Facility: CLINIC | Age: 62
End: 2017-04-24
Payer: COMMERCIAL

## 2017-04-24 VITALS
TEMPERATURE: 98 F | WEIGHT: 153 LBS | DIASTOLIC BLOOD PRESSURE: 88 MMHG | SYSTOLIC BLOOD PRESSURE: 150 MMHG | HEIGHT: 69 IN | BODY MASS INDEX: 22.66 KG/M2 | OXYGEN SATURATION: 98 % | HEART RATE: 99 BPM

## 2017-04-24 DIAGNOSIS — R19.7 DIARRHEA, UNSPECIFIED TYPE: ICD-10-CM

## 2017-04-24 DIAGNOSIS — Z51.81 MEDICATION MONITORING ENCOUNTER: ICD-10-CM

## 2017-04-24 DIAGNOSIS — R63.1 ALWAYS THIRSTY: ICD-10-CM

## 2017-04-24 DIAGNOSIS — S06.0X1D CONCUSSION, WITH LOSS OF CONSCIOUSNESS OF 30 MINUTES OR LESS, SUBSEQUENT ENCOUNTER: Primary | ICD-10-CM

## 2017-04-24 DIAGNOSIS — E86.0 DEHYDRATION: ICD-10-CM

## 2017-04-24 DIAGNOSIS — I10 ESSENTIAL HYPERTENSION WITH GOAL BLOOD PRESSURE LESS THAN 140/90: ICD-10-CM

## 2017-04-24 LAB
ALBUMIN UR-MCNC: NEGATIVE MG/DL
APPEARANCE UR: CLEAR
BILIRUB UR QL STRIP: NEGATIVE
COLOR UR AUTO: YELLOW
ERYTHROCYTE [DISTWIDTH] IN BLOOD BY AUTOMATED COUNT: 13.1 % (ref 10–15)
GLUCOSE BLD-MCNC: 100 MG/DL (ref 70–99)
GLUCOSE UR STRIP-MCNC: NEGATIVE MG/DL
HBA1C MFR BLD: 5.5 % (ref 4.3–6)
HCT VFR BLD AUTO: 40.3 % (ref 35–47)
HGB BLD-MCNC: 13.4 G/DL (ref 11.7–15.7)
HGB UR QL STRIP: NEGATIVE
KETONES UR STRIP-MCNC: NEGATIVE MG/DL
LEUKOCYTE ESTERASE UR QL STRIP: NEGATIVE
MCH RBC QN AUTO: 29.4 PG (ref 26.5–33)
MCHC RBC AUTO-ENTMCNC: 33.3 G/DL (ref 31.5–36.5)
MCV RBC AUTO: 88 FL (ref 78–100)
NITRATE UR QL: NEGATIVE
PH UR STRIP: 6.5 PH (ref 5–7)
PLATELET # BLD AUTO: 317 10E9/L (ref 150–450)
RBC # BLD AUTO: 4.56 10E12/L (ref 3.8–5.2)
SP GR UR STRIP: <=1.005 (ref 1–1.03)
URN SPEC COLLECT METH UR: NORMAL
UROBILINOGEN UR STRIP-ACNC: 0.2 EU/DL (ref 0.2–1)
WBC # BLD AUTO: 8.4 10E9/L (ref 4–11)

## 2017-04-24 PROCEDURE — 80053 COMPREHEN METABOLIC PANEL: CPT | Performed by: FAMILY MEDICINE

## 2017-04-24 PROCEDURE — 83036 HEMOGLOBIN GLYCOSYLATED A1C: CPT | Performed by: FAMILY MEDICINE

## 2017-04-24 PROCEDURE — 81003 URINALYSIS AUTO W/O SCOPE: CPT | Performed by: FAMILY MEDICINE

## 2017-04-24 PROCEDURE — 36415 COLL VENOUS BLD VENIPUNCTURE: CPT | Performed by: FAMILY MEDICINE

## 2017-04-24 PROCEDURE — 85027 COMPLETE CBC AUTOMATED: CPT | Performed by: FAMILY MEDICINE

## 2017-04-24 PROCEDURE — 82947 ASSAY GLUCOSE BLOOD QUANT: CPT | Mod: 59 | Performed by: FAMILY MEDICINE

## 2017-04-24 PROCEDURE — 99214 OFFICE O/P EST MOD 30 MIN: CPT | Performed by: FAMILY MEDICINE

## 2017-04-24 RX ORDER — ONDANSETRON 4 MG/1
4-8 TABLET, ORALLY DISINTEGRATING ORAL EVERY 8 HOURS PRN
Qty: 20 TABLET | Refills: 1 | Status: SHIPPED | OUTPATIENT
Start: 2017-04-24 | End: 2017-09-14

## 2017-04-24 NOTE — NURSING NOTE
"Chief Complaint   Patient presents with     LAB REQUEST       Initial /88  Pulse 99  Temp 98  F (36.7  C) (Oral)  Ht 5' 9\" (1.753 m)  Wt 153 lb (69.4 kg)  LMP 06/28/2001  SpO2 98%  BMI 22.59 kg/m2 Estimated body mass index is 22.59 kg/(m^2) as calculated from the following:    Height as of this encounter: 5' 9\" (1.753 m).    Weight as of this encounter: 153 lb (69.4 kg)..  BP completed using cuff size: rosangela Stokes MA  "

## 2017-04-24 NOTE — PATIENT INSTRUCTIONS
Labs pending -- will follow up with results by Wednessday.    Prescribed zofran for nausea.     Concussion specialist referral - they will call to schedule          [facility]                        To reach your care team during and after hours:   [phone]  To reach our pharmacy:        [phone]    Clinic Hours                        Our clinic hours are:    Monday   7:30 am to 7:00 pm                  Tuesday through Friday 7:30 am to 5:00 pm                             Saturday   8:00 am to 12:00 pm      Sunday   Closed      Pharmacy Hours                        Our pharmacy hours are:    Monday   8:30 am to 7:00 pm       Tuesday to Friday  8:30 am to 6:00 pm                       Saturday    9:00 am to 1:00 pm              Sunday    Closed              There is also information available at our web site:  [email or web address]    If your provider ordered any lab tests and you do not receive the results within 10 business days, please call the clinic.    If you need a medication refill please contact your pharmacy.  Please allow 2-3 business days for your refill to be completed.    Our clinic offers telephone visits and e visits.  Please ask one of your team members to explain more.      Use [employer] (secure email communication and access to your chart) to send your primary care provider a message or make an appointment. Ask someone on your Team how to sign up for [employer].  Immunizations                      Immunization History   Administered Date(s) Administered     Influenza (H1N1) 12/28/2009     Influenza (IIV3) 11/15/2001, 11/16/2004, 10/30/2009, 10/03/2012, 10/02/2013, 10/08/2014     Influenza Intranasal Vaccine 4 valent 10/25/2016     Pneumococcal 23 valent 10/23/2012     TD (ADULT, 7+) 04/11/1995, 02/14/2005     TDAP Vaccine (Boostrix) 10/11/2011     Zoster vaccine, live 10/11/2011        Health Maintenance                         Health Maintenance Due   Topic Date Due     Hepatitis  C Screening  08/18/1972     Discuss Advance Directive Planning  10/11/2016     Osteoporosis Screening (Dexa) - every 3 years   11/20/2016     Mammogram - yearly  01/14/2017     Basic Metabolic Lab - yearly  04/21/2017     Microalbumin Lab - yearly  04/21/2017     Wellness Visit with your Primary Provider - yearly  04/21/2017

## 2017-04-24 NOTE — PROGRESS NOTES
SUBJECTIVE:                                                    Jolynn Haji is a 61 year old female who presents to clinic today for the following health issues:    Pt has a concussion, day 15. Pt was on Levaquin for sinus infection. Prior to this, used a zpack. When pt started Levaquin, was thirsty and is continuing to be thirsty. Pt stopped taking this medication on April 9th.      Pt reports they know they were dehydrated and wonders if this is why they were so thirsty. Pt has headaches in the morning.      Advised pt to make sure they are getting well balanced meals and snacks, broths, foods, etc. Pt reported they have not had as much sodium, mainly water lately. Pt declined to be triaged, only wanted to report symptoms.      Pt wanted to see JV today, advised that they would not be able to be worked in today, then pt requested TS for later today. Advised pt to monitor symptoms, report any change in symptoms, go to UC or ER with any increase or change, The patient indicates understanding of these issues and agrees with the plan.     Reports dizziness started 15 minutes ago.    Reports that diarrhea has improved with a bland diet. Sinus infection symptoms are gone and Azalea notes increased fluid intake with increased urination - decreased sleep due to waking up to use the bathroom. CT scan at the hospital came back normal. Azalea reports constant thirst since Levaquin with no improvement. She notes upset stomach, nausea, headaches, difficulty with concentration and focus - worse in the morning with increased dizziness.     Glucose   Date Value Ref Range Status   04/21/2016 87 70 - 99 mg/dL Final     Hypertension -- lisinopril.     BP Readings from Last 3 Encounters:   04/24/17 150/88   04/14/17 (!) 141/94   04/12/17 110/70     Problem list and histories reviewed & adjusted, as indicated.  Additional history: as documented    Reviewed and updated as needed this visit by clinical staff  Tobacco  Allergies  Meds   Med Hx  Surg Hx  Fam Hx  Soc Hx      Reviewed and updated as needed this visit by Provider         Wt Readings from Last 4 Encounters:   04/24/17 153 lb (69.4 kg)   04/12/17 152 lb 12.8 oz (69.3 kg)   03/22/17 163 lb (73.9 kg)   03/13/17 163 lb 13 oz (74.3 kg)       Health Maintenance    Health Maintenance Due   Topic Date Due     HEPATITIS C SCREENING  08/18/1972     ADVANCE DIRECTIVE PLANNING Q5 YRS (NO INBASKET)  10/11/2016     DEXA Q3 YR  11/20/2016     MAMMO Q1 YR INBASKET MESSAGE  01/14/2017     BMP Q1 YR (NO INBASKET)  04/21/2017     MICROALBUMIN Q1 YEAR( NO INBASKET)  04/21/2017     WELLNESS VISIT Q1 YR (NO INBASKET)  04/21/2017       Current Problem List    Patient Active Problem List   Diagnosis     Essential hypertension with goal blood pressure less than 140/90     Hidradenitis     CELIAC DISEASE- family hx of      Localized osteoarthritis of hand     Synovial cyst of popliteal space     DERMATOPHYTOSIS OF NAIL- toenails      Lipidosis     Osteopenia     Postmenopausal     NUMBNESS AND TINGLING OF FOOT - bilateral -mild      CARDIOVASCULAR SCREENING; LDL GOAL LESS THAN 160     Numbness of feet- distal feet R>L      Advanced directives, counseling/discussion     Stress incontinence, female - mild     Cough     Acute bronchitis- recurrent- ? related to mold in school building- pt has since retired and no further bronchitis     Rosacea     History of migraine headaches- assoc. with nausea/vomiting - resolved around menopause - were  premenstrual      Dupuytren's disease of palm - right 4th digit flexor tendon     Sun-damaged skin     Seasonal allergic rhinitis     Basal cell carcinoma of leg, right     Concussion with loss of consciousness, unspecified duration, initial encounter       Past Medical History    Past Medical History:   Diagnosis Date     Abnormal glandular Papanicolaou smear of cervix      Abnormal glandular Papanicolaou smear of cervix- ASCUS in 2000 12/5/2003    ASC H- 2000     Basal cell  carcinoma      Basal cell carcinoma of leg, right 5/2016     Hidradenitis     left  groin     HSIL (high grade squamous intraepithelial lesion) on Pap smear of cervix 7/5/2000    Normal paps from 2001 to 2016/cc     Hypertension goal BP (blood pressure) < 140/90      OA (osteoarthritis) of knee     moderate     Postmenopausal since 7/2008      Shingles 7/23/2010    left      Synovial cyst of popliteal space        Past Surgical History    Past Surgical History:   Procedure Laterality Date     C NONSPECIFIC PROCEDURE  2000    Colposcopy       Current Medications    Current Outpatient Prescriptions   Medication Sig Dispense Refill     ondansetron (ZOFRAN ODT) 4 MG ODT tab Take 1-2 tablets (4-8 mg) by mouth every 8 hours as needed for nausea 20 tablet 1     lisinopril (PRINIVIL/ZESTRIL) 5 MG tablet Take 1 tablet (5 mg) by mouth daily 30 tablet 1     fluticasone (FLONASE) 50 MCG/ACT spray Spray 1-2 sprays into both nostrils daily 1 Bottle 11     loratadine 10 MG capsule Take 10 mg by mouth daily 30 capsule      albuterol (PROAIR HFA, PROVENTIL HFA, VENTOLIN HFA) 108 (90 BASE) MCG/ACT inhaler Inhale 2 puffs into the lungs every 6 hours as needed for shortness of breath / dyspnea 1 Inhaler 5     fluticasone (FLOVENT HFA) 110 MCG/ACT inhaler Inhale 2 puffs into the lungs 2 times daily 2 puffs 1 Inhaler 12     metroNIDAZOLE (METROCREAM) 0.75 % cream APPLY TOPICALLY TWO TIMES A DAY TO FACE 45 g 5     [DISCONTINUED] lisinopril (PRINIVIL,ZESTRIL) 2.5 MG tablet TAKE ONE TABLET BY MOUTH EVERY DAY 90 tablet 1       Allergies    Allergies   Allergen Reactions     Augmentin Itching     itching, rash     Fosamax Diarrhea     Diarrhea, stomach cramps, jaw pain        Immunizations    Immunization History   Administered Date(s) Administered     Influenza (H1N1) 12/28/2009     Influenza (IIV3) 11/15/2001, 11/16/2004, 10/30/2009, 10/03/2012, 10/02/2013, 10/08/2014     Influenza Intranasal Vaccine 4 valent 10/25/2016     Pneumococcal 23  valent 10/23/2012     TD (ADULT, 7+) 1995, 2005     TDAP Vaccine (Boostrix) 10/11/2011     Zoster vaccine, live 10/11/2011       Family History    Family History   Problem Relation Age of Onset     Thyroid Disease Mother      Graves disease     Hypertension Mother      DIABETES Mother      type 2     Blood Disease Mother       from leukemia at age 78     DIABETES Daughter      HEART DISEASE Father 62     MV problem with CHF  at 62/ from MI     Hypertension Father      Cardiovascular Father      CANCER Maternal Uncle      type ??     CANCER Maternal Uncle      liver       Social History    Social History     Social History     Marital status:      Spouse name: Zach     Number of children: 2     Years of education: 16     Occupational History     teacher - first grade - retired       [email or web address]     Social History Main Topics     Smoking status: Never Smoker     Smokeless tobacco: Never Used     Alcohol use 12.0 oz/week      Comment: 1 per month     Drug use: No      Comment: no herbal meds either     Sexual activity: Yes     Partners: Male     Birth control/ protection: Surgical      Comment: -was on DepoProvera since -2008 -  had vasectomy     Other Topics Concern      Service No     Blood Transfusions No     Caffeine Concern No     Occupational Exposure No     Hobby Hazards No     Sleep Concern No     Stress Concern No     Weight Concern No     Special Diet No     Back Care No     Exercise Yes     regular      Bike Helmet No     Seat Belt Yes     always     Self-Exams Yes     SBE  encouraged monthly     Parent/Sibling W/ Cabg, Mi Or Angioplasty Before 65f 55m? No     Social History Narrative    calcium - 3 +dairy servings/day and/or calcium supplement daily     flex sig/colonoscopy -referred  - was postponed by [facility] - will pt another referral  3/14/06, didn't go - referred again 07 =  "normal.     sun precautions - discussed    mammogram - yearly     Td booster -1995, 2/14/05     pneumovax -at age 59-65    DEXA -normal 6/2005- recheck this year 2008 - referral given 2/2008    stool hemoccults - every year after age 40    ASA- start 81mg asa qday    mulvitamin - encouraged    Last menses about 7/08.         no hot flashes- 2005 - was on Depoprovera 2000- 2/2008        All above reviewed and updated, all stable unless otherwise noted    Recent labs reviewed    ROS:  10 point ROS of systems including Constitutional, Eyes, Respiratory, Cardiovascular, Gastroenterology, Genitourinary, Integumentary, Muscularskeletal, Psychiatric were all negative except for pertinent positives noted in my HPI.    OBJECTIVE:                                                    /88  Pulse 99  Temp 98  F (36.7  C) (Oral)  Ht 5' 9\" (1.753 m)  Wt 153 lb (69.4 kg)  LMP 06/28/2001  SpO2 98%  BMI 22.59 kg/m2  Body mass index is 22.59 kg/(m^2).  GENERAL: healthy, alert and no distress  EYES: Eyes grossly normal to inspection, extraocular movements - intact, and PERRL  HENT: ear canals- normal; TMs- normal; Nose- normal; Mouth- no ulcers, no lesions  NECK: no tenderness, no adenopathy, no asymmetry, no masses, no stiffness; thyroid- normal to palpation  RESP: lungs clear to auscultation - no rales, no rhonchi, no wheezes  CV: regular rates and rhythm, normal S1 S2, no S3 or S4 and no murmur, no click or rub -  ABDOMEN: soft, no tenderness, no  hepatosplenomegaly, no masses, normal bowel sounds  MS: extremities- no gross deformities noted, no edema  SKIN: no suspicious lesions, no rashes  NEURO: mentation intact and speech normal  BACK: no CVA tenderness, no paralumbar tenderness  PSYCH: Alert and oriented times 3; speech- coherent , normal rate and volume; able to articulate logical thoughts, able to abstract reason, no tangential thoughts, no hallucinations or delusions, affect- normal    DIAGNOSTICS/PROCEDURES:      "                                                 Results for orders placed or performed in visit on 04/24/17 (from the past 24 hour(s))   Hemoglobin A1c   Result Value Ref Range    Hemoglobin A1C 5.5 4.3 - 6.0 %   CBC with platelets   Result Value Ref Range    WBC 8.4 4.0 - 11.0 10e9/L    RBC Count 4.56 3.8 - 5.2 10e12/L    Hemoglobin 13.4 11.7 - 15.7 g/dL    Hematocrit 40.3 35.0 - 47.0 %    MCV 88 78 - 100 fl    MCH 29.4 26.5 - 33.0 pg    MCHC 33.3 31.5 - 36.5 g/dL    RDW 13.1 10.0 - 15.0 %    Platelet Count 317 150 - 450 10e9/L   *UA reflex to Microscopic and Culture ([facility] (except [place] and [place])   Result Value Ref Range    Color Urine Yellow     Appearance Urine Clear     Glucose Urine Negative NEG mg/dL    Bilirubin Urine Negative NEG    Ketones Urine Negative NEG mg/dL    Specific Gravity Urine <=1.005 1.003 - 1.035    Blood Urine Negative NEG    pH Urine 6.5 5.0 - 7.0 pH    Protein Albumin Urine Negative NEG mg/dL    Urobilinogen Urine 0.2 0.2 - 1.0 EU/dL    Nitrite Urine Negative NEG    Leukocyte Esterase Urine Negative NEG    Source Midstream Urine    Glucose, whole blood   Result Value Ref Range    Glucose Whole Blood 100 (H) 70 - 99 mg/dL        ASSESSMENT/PLAN:                                                        ICD-10-CM    1. Concussion, with loss of consciousness of 30 minutes or less, subsequent encounter S06.0X1D Comprehensive metabolic panel     Hemoglobin A1c     CBC with platelets     ondansetron (ZOFRAN ODT) 4 MG ODT tab     CONCUSSION  REFERRAL   2. Always thirsty R63.1 Comprehensive metabolic panel     Hemoglobin A1c     CBC with platelets     Glucose, whole blood     CANCELED: Glucose by meter   3. Dehydration E86.0 Comprehensive metabolic panel     Hemoglobin A1c     CBC with platelets     ondansetron (ZOFRAN ODT) 4 MG ODT tab     *UA reflex to Microscopic and Culture ([facility] (except [place] and [place])   4. Diarrhea,  unspecified type R19.7    5. Essential hypertension with goal blood pressure less than 140/90 I10 Comprehensive metabolic panel   6. Medication monitoring encounter Z51.81 Comprehensive metabolic panel     Hemoglobin A1c     CBC with platelets     *UA reflex to Microscopic and Culture ([facility] (except [place] and [place])     Discussed treatment/modality options, including risk and benefits, she desires further health care maintenance, further labs, new medication(zofran) and referral(concussion specialist). All diagnosis above reviewed and noted above, otherwise stable.  [facility] orders for further details.  Follow up as needed.    Labs pending.     Consider PT, sports medicine, neurology referral.     Prescribed zofran.     Concussion specialist referral.     Health Maintenance Due   Topic Date Due     HEPATITIS C SCREENING  08/18/1972     ADVANCE DIRECTIVE PLANNING Q5 YRS (NO INBASKET)  10/11/2016     DEXA Q3 YR  11/20/2016     MAMMO Q1 YR INBASKET MESSAGE  01/14/2017     BMP Q1 YR (NO INBASKET)  04/21/2017     MICROALBUMIN Q1 YEAR( NO INBASKET)  04/21/2017     WELLNESS VISIT Q1 YR (NO INBASKET)  04/21/2017     See Patient Instructions    This document serves as a record of the services and decisions personally performed and made by Patrick Melgar MD [facility]. It was created on their behalf by Suzanne Sandy, a trained medical scribe. The creation of this document is based the provider's statements to the medical scribe. Suzanne Sandy April 24, 2017 3:35 PM              Patrick Melgar MD [address]  [address]  [phone] Fax

## 2017-04-24 NOTE — TELEPHONE ENCOUNTER
Pt has a concussion, day 15.  Pt was on Levaquin for sinus infection. Prior to this, used a zpack.  When pt started Levaquin, was thirsty and is continuing to be thirsty. Pt stopped taking this medication on April 9th.      Pt reports they know they were dehydrated and wonders if this is why they were so thirsty.  Pt has headaches in the morning.      Advised pt to make sure they are getting well balanced meals and snacks, broths, foods, etc. Pt reported they have not had as much sodium, mainly water lately.  Pt declined to be triaged, only wanted to report symptoms.     Pt wanted to see JV today, advised that they would not be able to be worked in today, then pt requested TS for later today.  Advised pt to monitor symptoms, report any change in symptoms, go to UC or ER with any increase or change, The patient indicates understanding of these issues and agrees with the plan.    Scheduled for 3:20.    Eileen Somers RN

## 2017-04-24 NOTE — MR AVS SNAPSHOT
After Visit Summary   4/24/2017    Jolynn Haji    MRN: [id number]           Patient Information     Date Of Birth          1955        Visit Information        Provider Department      4/24/2017 3:20 PM Patrick Melgar MD [employer]        Today's Diagnoses     Concussion, with loss of consciousness of 30 minutes or less, subsequent encounter    -  1    Dehydration        Diarrhea, unspecified type        Always thirsty        Essential hypertension with goal blood pressure less than 140/90        Medication monitoring encounter          Care Instructions    Labs pending -- will follow up with results by Wednessday.    Prescribed zofran for nausea.     Concussion specialist referral - they will call to schedule          [facility]                        To reach your care team during and after hours:   [phone]  To reach our pharmacy:        [phone]    Clinic Hours                        Our clinic hours are:    Monday   7:30 am to 7:00 pm                  Tuesday through Friday 7:30 am to 5:00 pm                             Saturday   8:00 am to 12:00 pm      Sunday   Closed      Pharmacy Hours                        Our pharmacy hours are:    Monday   8:30 am to 7:00 pm       Tuesday to Friday  8:30 am to 6:00 pm                       Saturday    9:00 am to 1:00 pm              Sunday    Closed              There is also information available at our web site:  [email or web address]    If your provider ordered any lab tests and you do not receive the results within 10 business days, please call the clinic.    If you need a medication refill please contact your pharmacy.  Please allow 2-3 business days for your refill to be completed.    Our clinic offers telephone visits and e visits.  Please ask one of your team members to explain more.      Use [email or web address] (secure email communication and access to your chart) to send your primary care provider a message or make  an appointment. Ask someone on your Team how to sign up for [employer].  Immunizations                      Immunization History   Administered Date(s) Administered     Influenza (H1N1) 12/28/2009     Influenza (IIV3) 11/15/2001, 11/16/2004, 10/30/2009, 10/03/2012, 10/02/2013, 10/08/2014     Influenza Intranasal Vaccine 4 valent 10/25/2016     Pneumococcal 23 valent 10/23/2012     TD (ADULT, 7+) 04/11/1995, 02/14/2005     TDAP Vaccine (Boostrix) 10/11/2011     Zoster vaccine, live 10/11/2011        Health Maintenance                         Health Maintenance Due   Topic Date Due     Hepatitis C Screening  08/18/1972     Discuss Advance Directive Planning  10/11/2016     Osteoporosis Screening (Dexa) - every 3 years   11/20/2016     Mammogram - yearly  01/14/2017     Basic Metabolic Lab - yearly  04/21/2017     Microalbumin Lab - yearly  04/21/2017     Wellness Visit with your Primary Provider - yearly  04/21/2017             Follow-ups after your visit        Additional Services     CONCUSSION  REFERRAL       [facility] is referring you to the Concussion  service at [facility].      The  Representative will assist you in the coordination of your concussion care as prescribed by your physician.    The  Representative will contact you within one business day, or you may contact the  Representative at [phone].    Referral Options:  Non-Sports related concussion management    Coverage of these services are subject to the terms and limitations of your health insurance plan.  Please call member services at your health plan with any benefit or coverage questions.     If X-rays, CT or MRI's have been performed, please contact the facility where they were done, to arrange for  prior to your scheduled appointment.  Please bring this referral request to your appointment and present it to your specialist.                  Who to  "contact     If you have questions or need follow up information about today's clinic visit or your schedule please contact [facility] directly at [phone].  Normal or non-critical lab and imaging results will be communicated to you by MyChart, letter or phone within 4 business days after the clinic has received the results. If you do not hear from us within 7 days, please contact the clinic through MyChart or phone. If you have a critical or abnormal lab result, we will notify you by phone as soon as possible.  Submit refill requests through [employer] or call your pharmacy and they will forward the refill request to us. Please allow 3 business days for your refill to be completed.          Additional Information About Your Visit        [employer] Information     [employer] lets you send messages to your doctor, view your test results, renew your prescriptions, schedule appointments and more. To sign up, go to [email or web address] . Click on \"Log in\" on the left side of the screen, which will take you to the Welcome page. Then click on \"Sign up Now\" on the right side of the page.     You will be asked to enter the access code listed below, as well as some personal information. Please follow the directions to create your username and password.     Your access code is: [email or web address]  Expires: 2017  3:30 PM     Your access code will  in 90 days. If you need help or a new code, please call your [facility] clinic or [phone].        Care EveryWhere ID     This is your Care EveryWhere ID. This could be used by other organizations to access your [facility] medical records  [id number]        Your Vitals Were     Pulse Temperature Height Last Period Pulse Oximetry BMI (Body Mass Index)    99 98  F (36.7  C) (Oral) 5' 9\" (1.753 m) 2001 98% 22.59 kg/m2       Blood Pressure from Last 3 Encounters:   17 150/88   17 (!) 141/94   17 110/70    Weight from Last 3 Encounters: "   04/24/17 153 lb (69.4 kg)   04/12/17 152 lb 12.8 oz (69.3 kg)   03/22/17 163 lb (73.9 kg)              We Performed the Following     *UA reflex to Microscopic and Culture ([facility] (except [place] and [place])     CBC with platelets     Comprehensive metabolic panel     CONCUSSION  REFERRAL     Glucose by meter     Hemoglobin A1c          Today's Medication Changes          These changes are accurate as of: 4/24/17  4:02 PM.  If you have any questions, ask your nurse or doctor.               Start taking these medicines.        Dose/Directions    ondansetron 4 MG ODT tab   Commonly known as:  ZOFRAN ODT   Used for:  Dehydration, Concussion, with loss of consciousness of 30 minutes or less, subsequent encounter   Started by:  Patrick Melgar MD        Dose:  4-8 mg   Take 1-2 tablets (4-8 mg) by mouth every 8 hours as needed for nausea   Quantity:  20 tablet   Refills:  1         These medicines have changed or have updated prescriptions.        Dose/Directions    lisinopril 5 MG tablet   Commonly known as:  PRINIVIL/ZESTRIL   This may have changed:  Another medication with the same name was removed. Continue taking this medication, and follow the directions you see here.   Used for:  Essential hypertension with goal blood pressure less than 140/90   Changed by:  Helga Ibarra MD        Dose:  5 mg   Take 1 tablet (5 mg) by mouth daily   Quantity:  30 tablet   Refills:  1            Where to get your medicines      These medications were sent to [facility] [address] [address]     Phone:  [phone]     ondansetron 4 MG ODT tab                Primary Care Provider Office Phone # Fax #    Helga Ibarra [phone]       [address] [address]        Thank you!     Thank you for choosing [facility]   for your care. Our goal is always to provide you with excellent care. Hearing back from our patients is one way we can continue to improve our services. Please take a few minutes to complete the written survey that you may receive in the mail after your visit with us. Thank you!             Your Updated Medication List - Protect others around you: Learn how to safely use, store and throw away your medicines at www.disposemymeds.org.          This list is accurate as of: 4/24/17  4:02 PM.  Always use your most recent med list.                   Brand Name Dispense Instructions for use    albuterol 108 (90 BASE) MCG/ACT Inhaler    PROAIR HFA/PROVENTIL HFA/VENTOLIN HFA    1 Inhaler    Inhale 2 puffs into the lungs every 6 hours as needed for shortness of breath / dyspnea       fluticasone 110 MCG/ACT Inhaler    FLOVENT HFA    1 Inhaler    Inhale 2 puffs into the lungs 2 times daily 2 puffs       fluticasone 50 MCG/ACT spray    FLONASE    1 Bottle    Spray 1-2 sprays into both nostrils daily       lisinopril 5 MG tablet    PRINIVIL/ZESTRIL    30 tablet    Take 1 tablet (5 mg) by mouth daily       loratadine 10 MG capsule     30 capsule    Take 10 mg by mouth daily       metroNIDAZOLE 0.75 % cream    METROCREAM    45 g    APPLY TOPICALLY TWO TIMES A DAY TO FACE       ondansetron 4 MG ODT tab    ZOFRAN ODT    20 tablet    Take 1-2 tablets (4-8 mg) by mouth every 8 hours as needed for nausea

## 2017-04-26 LAB
ALBUMIN SERPL-MCNC: 4.1 G/DL (ref 3.4–5)
ALP SERPL-CCNC: 79 U/L (ref 40–150)
ALT SERPL W P-5'-P-CCNC: 22 U/L (ref 0–50)
ANION GAP SERPL CALCULATED.3IONS-SCNC: 11 MMOL/L (ref 3–14)
AST SERPL W P-5'-P-CCNC: 14 U/L (ref 0–45)
BILIRUB SERPL-MCNC: 0.6 MG/DL (ref 0.2–1.3)
BUN SERPL-MCNC: 8 MG/DL (ref 7–30)
CALCIUM SERPL-MCNC: 9.1 MG/DL (ref 8.5–10.1)
CHLORIDE SERPL-SCNC: 106 MMOL/L (ref 94–109)
CO2 SERPL-SCNC: 24 MMOL/L (ref 20–32)
CREAT SERPL-MCNC: 0.61 MG/DL (ref 0.52–1.04)
GFR SERPL CREATININE-BSD FRML MDRD: NORMAL ML/MIN/1.7M2
GLUCOSE SERPL-MCNC: 90 MG/DL (ref 70–99)
POTASSIUM SERPL-SCNC: 3.9 MMOL/L (ref 3.4–5.3)
PROT SERPL-MCNC: 7.2 G/DL (ref 6.8–8.8)
SODIUM SERPL-SCNC: 141 MMOL/L (ref 133–144)

## 2017-04-29 ENCOUNTER — OFFICE VISIT (OUTPATIENT)
Dept: FAMILY MEDICINE | Facility: CLINIC | Age: 62
End: 2017-04-29
Payer: COMMERCIAL

## 2017-04-29 ENCOUNTER — TELEPHONE (OUTPATIENT)
Dept: FAMILY MEDICINE | Facility: CLINIC | Age: 62
End: 2017-04-29

## 2017-04-29 VITALS
DIASTOLIC BLOOD PRESSURE: 72 MMHG | WEIGHT: 150 LBS | HEART RATE: 125 BPM | SYSTOLIC BLOOD PRESSURE: 104 MMHG | BODY MASS INDEX: 22.22 KG/M2 | HEIGHT: 69 IN | OXYGEN SATURATION: 98 % | TEMPERATURE: 98.6 F

## 2017-04-29 DIAGNOSIS — G47.00 INSOMNIA, UNSPECIFIED TYPE: ICD-10-CM

## 2017-04-29 DIAGNOSIS — S06.0X9D CONCUSSION WITH LOSS OF CONSCIOUSNESS, UNSPECIFIED DURATION, SUBSEQUENT ENCOUNTER: Primary | ICD-10-CM

## 2017-04-29 PROCEDURE — 99213 OFFICE O/P EST LOW 20 MIN: CPT | Performed by: NURSE PRACTITIONER

## 2017-04-29 NOTE — MR AVS SNAPSHOT
After Visit Summary   4/29/2017    Jolynn Haji    MRN: [id number]           Patient Information     Date Of Birth          1955        Visit Information        Provider Department      4/29/2017 10:00 AM Lakeisha Pascual APRN CNP [facility]        Today's Diagnoses     Concussion with loss of consciousness, unspecified duration, subsequent encounter    -  1    Insomnia, unspecified type           Follow-ups after your visit        Your next 10 appointments already scheduled     May 05, 2017  2:15 PM CDT   (Arrive by 2:00 PM)   NEW CONCUSSION with FORTINO Edmond CNP   [facility] Concussion ([facility])    [address]  4th Floor  [facility] [address] [phone]              Who to contact     If you have questions or need follow up information about today's clinic visit or your schedule please contact [facility] directly at [phone].  Normal or non-critical lab and imaging results will be communicated to you by MyChart, letter or phone within 4 business days after the clinic has received the results. If you do not hear from us within 7 days, please contact the clinic through [employer] or phone. If you have a critical or abnormal lab result, we will notify you by phone as soon as possible.  Submit refill requests through [employer] or call your pharmacy and they will forward the refill request to us. Please allow 3 business days for your refill to be completed.          Additional Information About Your Visit        [employer] Information     [employer] gives you secure access to your electronic health record. If you see a primary care provider, you can also send messages to your care team and make appointments. If you have questions, please call your primary care clinic.  If you do not have a primary care provider, please call [phone] and they will assist you.        Care EveryWhere ID     This is your Care  "EveryWhere ID. This could be used by other organizations to access your [facility] medical records  [id number]        Your Vitals Were     Pulse Temperature Height Last Period Pulse Oximetry BMI (Body Mass Index)    125 98.6  F (37  C) (Oral) 5' 9\" (1.753 m) 06/28/2001 98% 22.15 kg/m2       Blood Pressure from Last 3 Encounters:   04/29/17 104/72   04/24/17 150/88   04/14/17 (!) 141/94    Weight from Last 3 Encounters:   04/29/17 150 lb (68 kg)   04/24/17 153 lb (69.4 kg)   04/12/17 152 lb 12.8 oz (69.3 kg)              Today, you had the following     No orders found for display         Today's Medication Changes          These changes are accurate as of: 4/29/17 11:59 PM.  If you have any questions, ask your nurse or doctor.               Start taking these medicines.        Dose/Directions    melatonin 5 MG Caps   Used for:  Insomnia, unspecified type   Started by:  Lakeisha Pascual APRN CNP        Dose:  5 mg   Take 5 mg by mouth daily as needed Take 4 hours prior to desired sleep   Refills:  0            Where to get your medicines      Some of these will need a paper prescription and others can be bought over the counter.  Ask your nurse if you have questions.     You don't need a prescription for these medications     melatonin 5 MG Caps                Primary Care Provider Office Phone # Fax #    Helga Ibarra [phone]       [address] [address]        Thank you!     Thank you for choosing [facility]  for your care. Our goal is always to provide you with excellent care. Hearing back from our patients is one way we can continue to improve our services. Please take a few minutes to complete the written survey that you may receive in the mail after your visit with us. Thank you!             Your Updated Medication List - Protect others around you: Learn how to safely use, store and throw away your medicines at " www.disposemymeds.org.          This list is accurate as of: 4/29/17 11:59 PM.  Always use your most recent med list.                   Brand Name Dispense Instructions for use    albuterol 108 (90 BASE) MCG/ACT Inhaler    PROAIR HFA/PROVENTIL HFA/VENTOLIN HFA    1 Inhaler    Inhale 2 puffs into the lungs every 6 hours as needed for shortness of breath / dyspnea       fluticasone 110 MCG/ACT Inhaler    FLOVENT HFA    1 Inhaler    Inhale 2 puffs into the lungs 2 times daily 2 puffs       fluticasone 50 MCG/ACT spray    FLONASE    1 Bottle    Spray 1-2 sprays into both nostrils daily       lisinopril 5 MG tablet    PRINIVIL/ZESTRIL    30 tablet    Take 1 tablet (5 mg) by mouth daily       loratadine 10 MG capsule     30 capsule    Take 10 mg by mouth daily       melatonin 5 MG Caps      Take 5 mg by mouth daily as needed Take 4 hours prior to desired sleep       metroNIDAZOLE 0.75 % cream    METROCREAM    45 g    APPLY TOPICALLY TWO TIMES A DAY TO FACE       ondansetron 4 MG ODT tab    ZOFRAN ODT    20 tablet    Take 1-2 tablets (4-8 mg) by mouth every 8 hours as needed for nausea

## 2017-04-29 NOTE — PROGRESS NOTES
SUBJECTIVE:                                                    Jolynn Haji is a 61 year old female who presents to clinic today for the following health issues:    Concern - sleep      Onset:  x3 weeks     Description:   Seen in clinic on 04/12  Was taking Levaquin and I developed diarrhea and I fell and ended up having a concussion   Sleeping problems started since   Before this I occasionally had a sleeping problem when i got very tired and exhausted , if I did not get enough rest I would feel nausea and vomit - this is happening every day I cannot sleep or night   At night I fall asleep for about 1 hour and half   Sometimes I get another hour or 2 by morning       Intensity: severe    Progression of Symptoms:  worsening    Accompanying Signs & Symptoms:  Nausea   Losing weight   No appetite   Headache - left side        Previous history of similar problem:     Yes only when i got really tired , last episode was 4-5 years ago     Precipitating factors:   Worsened by: none    Alleviating factors:  Improved by: none       Therapies Tried and outcome: melatonin - slept from 10 pm to 2.15 AM  ( last night)     Patient history of a concussion 20 days ago.   Patient has a follow-up with the concussion clinic on May 5th, 2017. Has not been able to sleep well.  Still has headaches, fatigue.  Many of her other symptoms have started to improve.  She believes her lasting symptoms are due to her inability to sleep.  She did take 1 tablet of melatonin last night - 10mg - she did sleep longer.      Problem list and histories reviewed & adjusted, as indicated.  Additional history: as documented    Patient Active Problem List   Diagnosis     Essential hypertension with goal blood pressure less than 140/90     Hidradenitis     CELIAC DISEASE- family hx of      Localized osteoarthritis of hand     Synovial cyst of popliteal space     DERMATOPHYTOSIS OF NAIL- toenails      Lipidosis     Osteopenia     Postmenopausal     NUMBNESS  AND TINGLING OF FOOT - bilateral -mild      CARDIOVASCULAR SCREENING; LDL GOAL LESS THAN 160     Numbness of feet- distal feet R>L      Advanced directives, counseling/discussion     Stress incontinence, female - mild     Cough     Acute bronchitis- recurrent- ? related to mold in school building- pt has since retired and no further bronchitis     Rosacea     History of migraine headaches- assoc. with nausea/vomiting - resolved around menopause - were  premenstrual      Dupuytren's disease of palm - right 4th digit flexor tendon     Sun-damaged skin     Seasonal allergic rhinitis     Basal cell carcinoma of leg, right     Concussion with loss of consciousness, unspecified duration, initial encounter     Past Surgical History:   Procedure Laterality Date     C NONSPECIFIC PROCEDURE      Colposcopy       Social History   Substance Use Topics     Smoking status: Never Smoker     Smokeless tobacco: Never Used     Alcohol use 12.0 oz/week      Comment: 1 per month     Family History   Problem Relation Age of Onset     Thyroid Disease Mother      Graves disease     Hypertension Mother      DIABETES Mother      type 2     Blood Disease Mother       from leukemia at age 78     DIABETES Daughter      HEART DISEASE Father 62     MV problem with CHF  at 62/ from MI     Hypertension Father      Cardiovascular Father      CANCER Maternal Uncle      type ??     CANCER Maternal Uncle      liver         Current Outpatient Prescriptions   Medication Sig Dispense Refill     melatonin 5 MG CAPS Take 5 mg by mouth daily as needed Take 4 hours prior to desired sleep       lisinopril (PRINIVIL/ZESTRIL) 5 MG tablet Take 1 tablet (5 mg) by mouth daily 30 tablet 1     fluticasone (FLONASE) 50 MCG/ACT spray Spray 1-2 sprays into both nostrils daily 1 Bottle 11     loratadine 10 MG capsule Take 10 mg by mouth daily 30 capsule      albuterol (PROAIR HFA, PROVENTIL HFA, VENTOLIN HFA) 108 (90 BASE) MCG/ACT inhaler Inhale  2 puffs into the lungs every 6 hours as needed for shortness of breath / dyspnea 1 Inhaler 5     fluticasone (FLOVENT HFA) 110 MCG/ACT inhaler Inhale 2 puffs into the lungs 2 times daily 2 puffs 1 Inhaler 12     metroNIDAZOLE (METROCREAM) 0.75 % cream APPLY TOPICALLY TWO TIMES A DAY TO FACE 45 g 5     ondansetron (ZOFRAN ODT) 4 MG ODT tab Take 1-2 tablets (4-8 mg) by mouth every 8 hours as needed for nausea 20 tablet 1     Allergies   Allergen Reactions     Augmentin Itching     itching, rash     Fosamax Diarrhea     Diarrhea, stomach cramps, jaw pain      Recent Labs   Lab Test  04/24/17   1604  04/21/16   1009   11/04/14   0853  10/28/13   0932   10/11/11   0941   A1C  5.5   --    --    --    --    --    --    LDL   --   108*   --   106  115   < >  104   HDL   --   76   --   70  68   < >  63   TRIG   --   59   --   77  73   < >  72   ALT  22  24   --   17   --    --   15   CR  0.61  0.66   < >  0.76  0.78   < >  0.74   GFRESTIMATED  >90  Non  GFR Calc    >90  Non  GFR Calc     < >  78  76   < >  81   GFRESTBLACK  >90   GFR Calc    >90   GFR Calc     < >  >90   GFR Calc    >90   < >  >90   POTASSIUM  3.9  4.0   < >  4.0  4.3   < >  4.9   TSH   --    --    --    --    --    --   1.48    < > = values in this interval not displayed.      BP Readings from Last 3 Encounters:   04/29/17 104/72   04/24/17 150/88   04/14/17 (!) 141/94    Wt Readings from Last 3 Encounters:   04/29/17 150 lb (68 kg)   04/24/17 153 lb (69.4 kg)   04/12/17 152 lb 12.8 oz (69.3 kg)         Labs reviewed in EPIC    Reviewed and updated as needed this visit by clinical staff  Tobacco  Allergies  Meds  Med Hx  Surg Hx  Fam Hx  Soc Hx      Reviewed and updated as needed this visit by Provider       ROS:  Constitutional, HEENT, cardiovascular, pulmonary, gi and gu systems are negative, except as otherwise noted.    OBJECTIVE:                                         "            /72  Pulse 125  Temp 98.6  F (37  C) (Oral)  Ht 5' 9\" (1.753 m)  Wt 150 lb (68 kg)  LMP 06/28/2001  SpO2 98%  BMI 22.15 kg/m2  Body mass index is 22.15 kg/(m^2).  GENERAL: healthy, alert and no distress  NECK: no adenopathy, no asymmetry, masses, or scars and thyroid normal to palpation  RESP: lungs clear to auscultation - no rales, rhonchi or wheezes  CV: regular rate and rhythm, normal S1 S2, no S3 or S4, no murmur, click or rub, no peripheral edema and peripheral pulses strong  ABDOMEN: soft, nontender, no hepatosplenomegaly, no masses and bowel sounds normal  MS: no gross musculoskeletal defects noted, no edema  NEURO: strength and tone- normal, sensory exam- grossly normal, mentation- intact, speech- normal, reflexes- symmetric. CN II-XII intact. Normal tone and strength in all four extremities. Normal sensation to light touch in all extremities  PSYCH: mentation appears normal, affect normal/bright  LYMPH: no cervical, supraclavicular, axillary, or inguinal adenopathy    Diagnostic Test Results:  No results found for this or any previous visit (from the past 24 hour(s)).     ASSESSMENT/PLAN:                                                    Jolynn was seen today for insomnia.    Diagnoses and all orders for this visit:    Concussion with loss of consciousness, unspecified duration, subsequent encounter    Insomnia, unspecified type  -     melatonin 5 MG CAPS; Take 5 mg by mouth daily as needed Take 4 hours prior to desired sleep      See Patient Instructions  Discussed following-up with concussion clinic for further evaluation and assistant with sleep. Discussed sleep hygeine.  Can continue with melatonin, max 10mg.  Discussed s/s requiring urgent evaluation.  Return to clinic if no improvement or symptoms worsen.  Patient and  verbalized understanding & agreed with plan of care.    FORTINO Vaughn, CNP  [facility]  "

## 2017-04-29 NOTE — NURSING NOTE
"Chief Complaint   Patient presents with     Insomnia       Initial /72  Pulse 125  Temp 98.6  F (37  C) (Oral)  Ht 5' 9\" (1.753 m)  Wt 150 lb (68 kg)  LMP 06/28/2001  SpO2 98%  BMI 22.15 kg/m2 Estimated body mass index is 22.15 kg/(m^2) as calculated from the following:    Height as of this encounter: 5' 9\" (1.753 m).    Weight as of this encounter: 150 lb (68 kg).  Medication Reconciliation: complete   Irene Serrano Medical Assistant      "

## 2017-05-01 NOTE — TELEPHONE ENCOUNTER
Called # [phone]    Left a non detailed VM     Shani Bunch RN, BSN  [facility]

## 2017-05-01 NOTE — TELEPHONE ENCOUNTER
Pt reports they did not sleep at all on Saturday night.  Pt was strung out with horrible headache. Last night pt took melatonin and one benadryl.  Pt then slept most of the night.    Pt is on a waiting list, they are not open M or Tuesday.    Pt is taking tylenol and ibuprofen at the same time per concussion provider.      Pt will call us back with any further symptoms or concerns.    The patient indicates understanding of these issues and agrees with the plan.  Eileen Somers RN

## 2017-05-01 NOTE — TELEPHONE ENCOUNTER
Please follow-up with patient Monday to see how she slept over the weekend.  Patient has appointment with the concussion clinic on Friday, could we see if she could get in any earlier?   I saw her on Saturday in the clinic.      rom Sign In Desk: [facility] - 4th Floor   4K      Department Address: [address] [place] [address]   Department Phone: [phone]     Thank you,  Lakeisha

## 2017-05-05 ENCOUNTER — OFFICE VISIT (OUTPATIENT)
Dept: SURGERY | Facility: CLINIC | Age: 62
End: 2017-05-05

## 2017-05-05 VITALS
BODY MASS INDEX: 23.04 KG/M2 | SYSTOLIC BLOOD PRESSURE: 128 MMHG | DIASTOLIC BLOOD PRESSURE: 83 MMHG | TEMPERATURE: 98.2 F | HEART RATE: 120 BPM | WEIGHT: 152 LBS | OXYGEN SATURATION: 99 % | HEIGHT: 68 IN

## 2017-05-05 DIAGNOSIS — S06.0X1A CONCUSSION, WITH LOSS OF CONSCIOUSNESS OF 30 MINUTES OR LESS, INITIAL ENCOUNTER: Primary | ICD-10-CM

## 2017-05-05 DIAGNOSIS — G47.00 INSOMNIA, UNSPECIFIED TYPE: ICD-10-CM

## 2017-05-05 RX ORDER — ACETAMINOPHEN 500 MG
1000 TABLET ORAL EVERY 6 HOURS PRN
Qty: 100 TABLET | Refills: 0 | Status: SHIPPED | OUTPATIENT
Start: 2017-05-05 | End: 2019-07-24

## 2017-05-05 RX ORDER — AMITRIPTYLINE HYDROCHLORIDE 10 MG/1
TABLET ORAL
Qty: 45 TABLET | Refills: 1 | Status: SHIPPED | OUTPATIENT
Start: 2017-05-05 | End: 2017-05-11

## 2017-05-05 RX ORDER — NAPROXEN SODIUM 220 MG
TABLET ORAL
Qty: 60 TABLET | COMMUNITY
Start: 2017-05-05 | End: 2017-09-14

## 2017-05-05 ASSESSMENT — PAIN SCALES - GENERAL: PAINLEVEL: NO PAIN (0)

## 2017-05-05 NOTE — PATIENT INSTRUCTIONS
"~ You WILL get better~    GENERAL ADVICE  ~~ Drink plenty of water throughout the day (8-10 glasses per day)  ~~ Avoid alcohol and caffeine  ~~ Write things down.  At home, at work, whenever there is something that you should remember, even it is simple.    HEADACHE  ~ Take your tylenol (1000 mg) three times a day as needed headache  ~ Take your aleve 1-2 tablets as needed for headache  Light sensitivities:   ~~ Avoid florescent lighting when possible  ~~ Always wear sunglasses outside and even wear them indoors if it is helpful.  ~~ No night driving  Sound sensitivities:  ~ Avoid crowded areas  ~ Avoid multiple conversations at the same time around you  ~ Avoid loud music or loud sounds    Continue with using the Calm lorena  Dont watch TV immediately before bed  Keep hydrated.  Continue eating your probiotic          Sleep Hygiene     What is it?    \"Sleep hygiene\" means having good sleep habits. Follow the tips below to sleep better at night.      Get on a schedule. Go to bed and get up at about the same time every day.    Listen to your body. Only try to sleep when you actually feel tired or sleepy.    Be patient. If you haven't been able to get to sleep after about 20 minutes or more, get up and do something calming or boring until you feel sleepy. Then, return to bed and try again.      Avoid caffeine (coffee, tea, cola drinks, chocolate and some medicines) for at least 4 to 6 hours before going to bed. We also suggest you don't use alcohol or nicotine (cigarettes) during this time. Both can make it harder for you to fall asleep and stay asleep.    Use your bed for sleeping only. That means no TV, computer or homework in bed!    Don't nap during the day. If you do nap, make sure it is for less than an hour and before 3 p.m.    Create sleep rituals that remind your body that it is time to sleep. Examples include breathing exercises, stretching, or reading a book.     Try a bath or shower before bed. Having a hot " "bath 1 to 2 hours before bedtime can help you feel sleepy.    Don't watch the clock. Checking the clock during the night can wake you up. It can also lead to negative thoughts such as \"I will never fall asleep.\"    Use a sleep diary. Track your sleep schedule to know your sleep patterns and to see where you can improve.    Get regular exercise. But try not to do heavy exercise in the 4 hours before bedtime.      Eat a healthy, balanced diet. Try eating a light, healthy snack before bed, but avoid eating a heavy meal.    Create the right sleeping area. A cool, dark, quiet room is best. If needed, try earplugs, fans and blackout curtains.      Keep your daytime routine the same even if you have a bad night sleep. Avoiding activities the next day can make it harder to sleep.          For informational purposes only. Not to replace the advice of your health care provider. Copyright   2013 [facility]. All rights reserved.    If you are still not sleeping any better by next week call me.   Or Abine message          "

## 2017-05-05 NOTE — MR AVS SNAPSHOT
"              After Visit Summary   5/5/2017    Jolynn Haji    MRN: [id number]           Patient Information     Date Of Birth          1955        Visit Information        Provider Department      5/5/2017 2:15 PM Magdalena Santana APRN CNP [facility]ussion        Today's Diagnoses     Concussion, with loss of consciousness of 30 minutes or less, initial encounter    -  1    Insomnia, unspecified type          Care Instructions    ~ You WILL get better~    GENERAL ADVICE  ~~ Drink plenty of water throughout the day (8-10 glasses per day)  ~~ Avoid alcohol and caffeine  ~~ Write things down.  At home, at work, whenever there is something that you should remember, even it is simple.    HEADACHE  ~ Take your tylenol (1000 mg) three times a day as needed headache  ~ Take your aleve 1-2 tablets as needed for headache  Light sensitivities:   ~~ Avoid florescent lighting when possible  ~~ Always wear sunglasses outside and even wear them indoors if it is helpful.  ~~ No night driving  Sound sensitivities:  ~ Avoid crowded areas  ~ Avoid multiple conversations at the same time around you  ~ Avoid loud music or loud sounds    Continue with using the Calm lorena  Dont watch TV immediately before bed  Keep hydrated.  Continue eating your probiotic          Sleep Hygiene     What is it?    \"Sleep hygiene\" means having good sleep habits. Follow the tips below to sleep better at night.      Get on a schedule. Go to bed and get up at about the same time every day.    Listen to your body. Only try to sleep when you actually feel tired or sleepy.    Be patient. If you haven't been able to get to sleep after about 20 minutes or more, get up and do something calming or boring until you feel sleepy. Then, return to bed and try again.      Avoid caffeine (coffee, tea, cola drinks, chocolate and some medicines) for at least 4 to 6 hours before going to bed. We also suggest you don't use alcohol or nicotine (cigarettes) during " "this time. Both can make it harder for you to fall asleep and stay asleep.    Use your bed for sleeping only. That means no TV, computer or homework in bed!    Don't nap during the day. If you do nap, make sure it is for less than an hour and before 3 p.m.    Create sleep rituals that remind your body that it is time to sleep. Examples include breathing exercises, stretching, or reading a book.     Try a bath or shower before bed. Having a hot bath 1 to 2 hours before bedtime can help you feel sleepy.    Don't watch the clock. Checking the clock during the night can wake you up. It can also lead to negative thoughts such as \"I will never fall asleep.\"    Use a sleep diary. Track your sleep schedule to know your sleep patterns and to see where you can improve.    Get regular exercise. But try not to do heavy exercise in the 4 hours before bedtime.      Eat a healthy, balanced diet. Try eating a light, healthy snack before bed, but avoid eating a heavy meal.    Create the right sleeping area. A cool, dark, quiet room is best. If needed, try earplugs, fans and blackout curtains.      Keep your daytime routine the same even if you have a bad night sleep. Avoiding activities the next day can make it harder to sleep.          For informational purposes only. Not to replace the advice of your health care provider. Copyright   2013 [facility]. All rights reserved.    If you are still not sleeping any better by next week call me.   Or Smartfieldt message                Follow-ups after your visit        Additional Services     CONCUSSION  REFERRAL       [facility] is referring you to the Concussion  service at [facility].      The  Representative will assist you in the coordination of your concussion care as prescribed by your physician.    The  Representative will contact you within one business day, or you may contact the  " Representative at [phone].    Referral Options:  Non-Sports related concussion management    Coverage of these services are subject to the terms and limitations of your health insurance plan.  Please call member services at your health plan with any benefit or coverage questions.     If X-rays, CT or MRI's have been performed, please contact the facility where they were done, to arrange for  prior to your scheduled appointment.  Please bring this referral request to your appointment and present it to your specialist.                  Follow-up notes from your care team     Return in about 4 weeks (around 6/2/2017).      Who to contact     Please call your clinic at [phone] to:    Ask questions about your health    Make or cancel appointments    Discuss your medicines    Learn about your test results    Speak to your doctor   If you have compliments or concerns about an experience at your clinic, or if you wish to file a complaint, please contact [facility] Patient Relations at [phone] or email us at [email or web address]         Additional Information About Your Visit        Zumper Information     Zumper gives you secure access to your electronic health record. If you see a primary care provider, you can also send messages to your care team and make appointments. If you have questions, please call your primary care clinic.  If you do not have a primary care provider, please call [phone] and they will assist you.      Zumper is an electronic gateway that provides easy, online access to your medical records. With Zumper, you can request a clinic appointment, read your test results, renew a prescription or communicate with your care team.     To access your existing account, please contact your [facility] or call [phone] for assistance.        Care EveryWhere ID     This is your Care EveryWhere ID. This could be  "used by other organizations to access your [facility] medical records  [id number]        Your Vitals Were     Pulse Temperature Height Last Period Pulse Oximetry BMI (Body Mass Index)    120 98.2  F (36.8  C) 5' 8.11\" 06/28/2001 99% 23.04 kg/m2       Blood Pressure from Last 3 Encounters:   05/05/17 128/83   04/29/17 104/72   04/24/17 150/88    Weight from Last 3 Encounters:   05/05/17 152 lb   04/29/17 150 lb   04/24/17 153 lb              We Performed the Following     CONCUSSION  REFERRAL          Today's Medication Changes          These changes are accurate as of: 5/5/17  4:02 PM.  If you have any questions, ask your nurse or doctor.               Start taking these medicines.        Dose/Directions    acetaminophen 500 MG tablet   Commonly known as:  TYLENOL   Used for:  Concussion, with loss of consciousness of 30 minutes or less, initial encounter   Started by:  Magdalena Santana APRN CNP        Dose:  1000 mg   Take 2 tablets (1,000 mg) by mouth every 6 hours as needed for mild pain   Quantity:  100 tablet   Refills:  0       amitriptyline 10 MG tablet   Commonly known as:  ELAVIL   Used for:  Concussion, with loss of consciousness of 30 minutes or less, initial encounter, Insomnia, unspecified type   Started by:  Magdalena Santana APRN CNP        Start at 1 tab at bedtime for 3 days, then take 2 tabs at bedtime   Quantity:  45 tablet   Refills:  1       naproxen sodium 220 MG tablet   Commonly known as:  ANAPROX   Used for:  Concussion, with loss of consciousness of 30 minutes or less, initial encounter   Started by:  Magdalena Santana APRN CNP        Take 1-2 tablets twice a day as needed for headache   Quantity:  60 tablet   Refills:  0            Where to get your medicines      These medications were sent to [facility] - [place], [place] - [address]  [address] [address]     Phone:  [phone]     acetaminophen 500 MG " tablet    amitriptyline 10 MG tablet         Some of these will need a paper prescription and others can be bought over the counter.  Ask your nurse if you have questions.     You don't need a prescription for these medications     naproxen sodium 220 MG tablet                Primary Care Provider Office Phone # Fax #    Helga Ibarra [phone]       [address] [address]        Thank you!     Thank you for choosing [facility]  for your care. Our goal is always to provide you with excellent care. Hearing back from our patients is one way we can continue to improve our services. Please take a few minutes to complete the written survey that you may receive in the mail after your visit with us. Thank you!             Your Updated Medication List - Protect others around you: Learn how to safely use, store and throw away your medicines at www.disposemymeds.org.          This list is accurate as of: 5/5/17  4:02 PM.  Always use your most recent med list.                   Brand Name Dispense Instructions for use    acetaminophen 500 MG tablet    TYLENOL    100 tablet    Take 2 tablets (1,000 mg) by mouth every 6 hours as needed for mild pain       albuterol 108 (90 BASE) MCG/ACT Inhaler    PROAIR HFA/PROVENTIL HFA/VENTOLIN HFA    1 Inhaler    Inhale 2 puffs into the lungs every 6 hours as needed for shortness of breath / dyspnea       amitriptyline 10 MG tablet    ELAVIL    45 tablet    Start at 1 tab at bedtime for 3 days, then take 2 tabs at bedtime       fluticasone 110 MCG/ACT Inhaler    FLOVENT HFA    1 Inhaler    Inhale 2 puffs into the lungs 2 times daily 2 puffs       fluticasone 50 MCG/ACT spray    FLONASE    1 Bottle    Spray 1-2 sprays into both nostrils daily       lisinopril 5 MG tablet    PRINIVIL/ZESTRIL    30 tablet    Take 1 tablet (5 mg) by mouth daily       loratadine 10 MG capsule     30 capsule    Take 10 mg by mouth  daily       melatonin 5 MG Caps      Take 5 mg by mouth daily as needed Take 4 hours prior to desired sleep       metroNIDAZOLE 0.75 % cream    METROCREAM    45 g    APPLY TOPICALLY TWO TIMES A DAY TO FACE       naproxen sodium 220 MG tablet    ANAPROX    60 tablet    Take 1-2 tablets twice a day as needed for headache       ondansetron 4 MG ODT tab    ZOFRAN ODT    20 tablet    Take 1-2 tablets (4-8 mg) by mouth every 8 hours as needed for nausea

## 2017-05-05 NOTE — LETTER
5/5/2017       RE: Jolynn Haji  [address] [place] [address]     Dear Colleague,    Thank you for referring your patient, Jolynn Haji, to the [facility] CONCUSSION at [facility]. Please see a copy of my visit note below.    [facility]cussion Clinic Admission  May 5, 2017        Assessment:  Jolynn Haji is a 61 year old yo female who presents for evaluation of a concussion which happened about a month ago.  The history is ear infection>>extended abx with ongoing diarrhea>> dehydration and syncope>> concussion>> ongoing GI issues, 10 lb weight loss, concussion symptoms worsened by gross insomnia    Plan:  Biggest concern of pt addressed: sleep  Symptoms most affecting pt: HA, Nausea, Insomnia  1. Dont watch tv right before bed  2. Do use the calm lorena right before bed  3. Continue melatonin  4. Take zofran for nausea  5. Start amitriptyline now.  10 mg Qhs,., increase dose to 20 mg qhs in 3 days.   6. Tylenol for HA  7. Return here in 3 weeks for re-eval, mychart if needs assistance earlier.      HPI  Time/date of injury:  4/9/17  Mechanism:  Fall from standing    Was walking, and had syncopal event.  Day 18 of abx after prior failed treatment for ear infection.  had horrible diarrhea, LOC 1-2 minutes.  Went to ED 4/14, CT neg     First couple weeks, slept ok, but now isnt sleeping much at all.  Several nights this week no sleep.  Sometimes sleeps very hard for about an hour, but then awake the rest of the night.  Historically has slept fine.  Goes to sleep, sleeps 8 hours.  No appetite, but started when was on abx, lost 11 lbs in 30 days.     HA nausea common although not currently (1-2 times per day).  HA not every day, when gets one is long and lasts for hours.  Patient has not been to therapy evaluation       Current details of HA:  Improved: no Typical Duration: up to 24 hours  Pre-injury frequency of headaches:  1/mo    Injury specifics:  1. Direct or indirect injury  --  2. Evidence of intracranial injury or skull fracture? --   3. Location of Impact: (Frontal, left temporal, right temporal, left parietal, right parietal, occipital, neck, indirect force)  4. Any retrograde amnesia and how long: no  5. Any Anterograde amnesia? No  6. LOC:  + 1-2 min    Concussion history  Prior concussion history:  No    Headache history  Prior HA history: when overtired get headache left temple, chronically, same place as HA now  Prior treatment for HA:  Hx of migraines: during menopause    Learning disability history-- (NONE of these unless otherwise noted)  Hx learning disabilities  Hx ADHD:  Hx other developmental disorder:    Mental health history (NONE of these unless otherwise noted)  Anxiety  Depression  Sleep disorder  Borderline   PTSD  Substance abuse  Bipolar    Past Medical History:   Diagnosis Date     Abnormal glandular Papanicolaou smear of cervix      Abnormal glandular Papanicolaou smear of cervix- ASCUS in 2000 12/5/2003    ASC H- 2000     Basal cell carcinoma      Basal cell carcinoma of leg, right 5/2016     Hidradenitis     left  groin     HSIL (high grade squamous intraepithelial lesion) on Pap smear of cervix 7/5/2000    Normal paps from 2001 to 2016/cc     Hypertension goal BP (blood pressure) < 140/90      OA (osteoarthritis) of knee     moderate     Postmenopausal since 7/2008      Shingles 7/23/2010    left      Synovial cyst of popliteal space        Patient Active Problem List   Diagnosis     Essential hypertension with goal blood pressure less than 140/90     Hidradenitis     CELIAC DISEASE- family hx of      Localized osteoarthritis of hand     Synovial cyst of popliteal space     DERMATOPHYTOSIS OF NAIL- toenails      Lipidosis     Osteopenia     Postmenopausal     NUMBNESS AND TINGLING OF FOOT - bilateral -mild      CARDIOVASCULAR SCREENING; LDL GOAL LESS THAN 160     Numbness of feet- distal feet R>L      Advanced directives, counseling/discussion     Stress  incontinence, female - mild     Cough     Acute bronchitis- recurrent- ? related to mold in school building- pt has since retired and no further bronchitis     Rosacea     History of migraine headaches- assoc. with nausea/vomiting - resolved around menopause - were  premenstrual      Dupuytren's disease of palm - right 4th digit flexor tendon     Sun-damaged skin     Seasonal allergic rhinitis     Basal cell carcinoma of leg, right     Concussion with loss of consciousness, unspecified duration, initial encounter         Pertinent social history:  Currently using alcohol: bottle of beer /month  Currently using nicotine: no  Currently using caffeine: 1 cup in am, prior to fall drank lots of diet coke  Currently Working: retired   Currently Living at and with: , the dog  Involved in what sports or activities: goes to [email or web address] , plays with grandson, lots of family and friends      Pertinent family history:  Was anyone in your family also injured: no  Family history of migraines?  no    Current medications:  Reconciled in chart today by clinic staff and reviewed by me.    REVIEW OF SYSTEMS:  Refer to DocFlowsheets:  Concussion symptoms  CONSTITUTIONAL:  see Concussion symptoms  EYES: see Concussion symptoms  ENT: see Concussion symptoms    RESPIRATORY: no shortness of breath, no cough  CARDIOVASCULAR:, no chest pain or pressure, + palpitations with anxiety  GASTROINTESTINAL: no nausea or vomiting, ++ occasional abdominal pain , + constipated  GENITOURINARY: no dysuria, frequency or urgency or hematuria  MUSCULOSKELETAL: no weakness, no pain  SKIN: no rashes, ecchymosis, abrasions or lacerations  NEUROLOGIC: no numbness or tingling of hands + chronic tingling of feet, no syncope, no tremors or weakness, no balance issues or gait disturbances  PSYCHIATRIC: see PHQ-9 and TIFFANY, Sleep: Not sleeping at all, none for 3 days    OBJECTIVE:   /83 (BP Location: Left arm, Patient Position: Chair, Cuff Size:  "Adult Regular)  Pulse 120  Temp 98.2  F (36.8  C)  Ht 5' 8.11\"  Wt 152 lb  LMP 06/28/2001  SpO2 99%  BMI 23.04 kg/m2    Wt Readings from Last 4 Encounters:   05/05/17 152 lb   04/29/17 150 lb   04/24/17 153 lb   04/12/17 152 lb 12.8 oz       EXAM:  GENERAL: alert, oriented to person, place, time  HEAD: atraumatic, normocephalic, trachea midline  EYES: PERRL, EOMI, corneas and conjunctivae clear  CHEST/PULMONARY: normal respiratory rate and rhythm   CARDIOVASCULAR: extremities warm with good peripheral pulses  GI: soft, non-tender, no guarding, no rebound tenderness and no tenderness to palpation  MUSCULOSKELETAL / EXTREMITIES: normal extremities,  SKIN: no rashes, laceration, ecchymosis, skin warm and dry.   PSYCHIATRIC: affect/mood normal, cooperative, normal judgement/insight and memory intact.  Anxious appearing.   Neuro:  Alert and oriented  Strength 5/5 extremities  Sensation 4/4 extremities    Shoulder shrug (C5):5/5  Bicep (C6):5/5  Tricep (C7):5/5  Neurologic/Visual:  TOAN: yes  EOMI: yes  Nystagmus: no  Painful eye movements: no  Convergence testing: Normal (</= 6 cm)  Coordination:       - Finger to Nose: normal       - Rapid Alternating Movements: normal  Balance Testing:       - Romberg: normal  Gait:  Walk in hallway at normal speed: Able   Cognitive:  Immediate object recall: 4/4  Recall 4 objects at 5 minutes:4/4  Reverse months of the year: Yes    Spell world backwards: Yes    Backwards number string:  Yes  With great delay    Time spent in one-on-one evaluation and discussion with patient regarding nature of problem, course, prior treatments, and therapeutic options, 75% of this 60 minute visit  was spent in counseling including this patients personal symptom triggers and education thereof.      Again, thank you for allowing me to participate in the care of your patient.      Sincerely,    FORTINO Edmond CNP      "

## 2017-05-05 NOTE — PROGRESS NOTES
[facility] Concussion Clinic Admission  May 5, 2017        Assessment:  Jolynn Haji is a 61 year old yo female who presents for evaluation of a concussion which happened about a month ago.  The history is ear infection>>extended abx with ongoing diarrhea>> dehydration and syncope>> concussion>> ongoing GI issues, 10 lb weight loss, concussion symptoms worsened by gross insomnia    Plan:  Biggest concern of pt addressed: sleep  Symptoms most affecting pt: HA, Nausea, Insomnia  1. Dont watch tv right before bed  2. Do use the calm lorena right before bed  3. Continue melatonin  4. Take zofran for nausea  5. Start amitriptyline now.  10 mg Qhs,., increase dose to 20 mg qhs in 3 days.   6. Tylenol for HA  7. Return here in 3 weeks for re-eval, mychart if needs assistance earlier.      HPI  Time/date of injury:  4/9/17  Mechanism:  Fall from standing    Was walking, and had syncopal event.  Day 18 of abx after prior failed treatment for ear infection.  had horrible diarrhea, LOC 1-2 minutes.  Went to ED 4/14, CT neg     First couple weeks, slept ok, but now isnt sleeping much at all.  Several nights this week no sleep.  Sometimes sleeps very hard for about an hour, but then awake the rest of the night.  Historically has slept fine.  Goes to sleep, sleeps 8 hours.  No appetite, but started when was on abx, lost 11 lbs in 30 days.     HA nausea common although not currently (1-2 times per day).  HA not every day, when gets one is long and lasts for hours.  Patient has not been to therapy evaluation       Current details of HA:  Improved: no Typical Duration: up to 24 hours  Pre-injury frequency of headaches:  1/mo    Injury specifics:  1. Direct or indirect injury --  2. Evidence of intracranial injury or skull fracture? --   3. Location of Impact: (Frontal, left temporal, right temporal, left parietal, right parietal, occipital, neck, indirect force)  4. Any retrograde amnesia and how long: no  5. Any Anterograde amnesia?  No  6. LOC:  + 1-2 min    Concussion history  Prior concussion history:  No    Headache history  Prior HA history: when overtired get headache left temple, chronically, same place as HA now  Prior treatment for HA:  Hx of migraines: during menopause    Learning disability history-- (NONE of these unless otherwise noted)  Hx learning disabilities  Hx ADHD:  Hx other developmental disorder:    Mental health history (NONE of these unless otherwise noted)  Anxiety  Depression  Sleep disorder  Borderline   PTSD  Substance abuse  Bipolar    Past Medical History:   Diagnosis Date     Abnormal glandular Papanicolaou smear of cervix      Abnormal glandular Papanicolaou smear of cervix- ASCUS in 2000 12/5/2003    ASC H- 2000     Basal cell carcinoma      Basal cell carcinoma of leg, right 5/2016     Hidradenitis     left  groin     HSIL (high grade squamous intraepithelial lesion) on Pap smear of cervix 7/5/2000    Normal paps from 2001 to 2016/cc     Hypertension goal BP (blood pressure) < 140/90      OA (osteoarthritis) of knee     moderate     Postmenopausal since 7/2008      Shingles 7/23/2010    left      Synovial cyst of popliteal space        Patient Active Problem List   Diagnosis     Essential hypertension with goal blood pressure less than 140/90     Hidradenitis     CELIAC DISEASE- family hx of      Localized osteoarthritis of hand     Synovial cyst of popliteal space     DERMATOPHYTOSIS OF NAIL- toenails      Lipidosis     Osteopenia     Postmenopausal     NUMBNESS AND TINGLING OF FOOT - bilateral -mild      CARDIOVASCULAR SCREENING; LDL GOAL LESS THAN 160     Numbness of feet- distal feet R>L      Advanced directives, counseling/discussion     Stress incontinence, female - mild     Cough     Acute bronchitis- recurrent- ? related to mold in school building- pt has since retired and no further bronchitis     Rosacea     History of migraine headaches- assoc. with nausea/vomiting - resolved around menopause - were   "premenstrual      Dupuytren's disease of palm - right 4th digit flexor tendon     Sun-damaged skin     Seasonal allergic rhinitis     Basal cell carcinoma of leg, right     Concussion with loss of consciousness, unspecified duration, initial encounter         Pertinent social history:  Currently using alcohol: bottle of beer /month  Currently using nicotine: no  Currently using caffeine: 1 cup in am, prior to fall drank lots of diet coke  Currently Working: retired   Currently Living at and with: , the dog  Involved in what sports or activities: goes to [employer] , plays with grandson, lots of family and friends      Pertinent family history:  Was anyone in your family also injured: no  Family history of migraines?  no    Current medications:  Reconciled in chart today by clinic staff and reviewed by me.    REVIEW OF SYSTEMS:  Refer to DocFlowsheets:  Concussion symptoms  CONSTITUTIONAL:  see Concussion symptoms  EYES: see Concussion symptoms  ENT: see Concussion symptoms    RESPIRATORY: no shortness of breath, no cough  CARDIOVASCULAR:, no chest pain or pressure, + palpitations with anxiety  GASTROINTESTINAL: no nausea or vomiting, ++ occasional abdominal pain , + constipated  GENITOURINARY: no dysuria, frequency or urgency or hematuria  MUSCULOSKELETAL: no weakness, no pain  SKIN: no rashes, ecchymosis, abrasions or lacerations  NEUROLOGIC: no numbness or tingling of hands + chronic tingling of feet, no syncope, no tremors or weakness, no balance issues or gait disturbances  PSYCHIATRIC: see PHQ-9 and TIFFANY, Sleep: Not sleeping at all, none for 3 days    OBJECTIVE:   /83 (BP Location: Left arm, Patient Position: Chair, Cuff Size: Adult Regular)  Pulse 120  Temp 98.2  F (36.8  C)  Ht 5' 8.11\"  Wt 152 lb  LMP 06/28/2001  SpO2 99%  BMI 23.04 kg/m2    Wt Readings from Last 4 Encounters:   05/05/17 152 lb   04/29/17 150 lb   04/24/17 153 lb   04/12/17 152 lb 12.8 oz       EXAM:  GENERAL: " alert, oriented to person, place, time  HEAD: atraumatic, normocephalic, trachea midline  EYES: PERRL, EOMI, corneas and conjunctivae clear  CHEST/PULMONARY: normal respiratory rate and rhythm   CARDIOVASCULAR: extremities warm with good peripheral pulses  GI: soft, non-tender, no guarding, no rebound tenderness and no tenderness to palpation  MUSCULOSKELETAL / EXTREMITIES: normal extremities,  SKIN: no rashes, laceration, ecchymosis, skin warm and dry.   PSYCHIATRIC: affect/mood normal, cooperative, normal judgement/insight and memory intact.  Anxious appearing.   Neuro:  Alert and oriented  Strength 5/5 extremities  Sensation 4/4 extremities    Shoulder shrug (C5):5/5  Bicep (C6):5/5  Tricep (C7):5/5  Neurologic/Visual:  TOAN: yes  EOMI: yes  Nystagmus: no  Painful eye movements: no  Convergence testing: Normal (</= 6 cm)  Coordination:       - Finger to Nose: normal       - Rapid Alternating Movements: normal  Balance Testing:       - Romberg: normal  Gait:  Walk in hallway at normal speed: Able   Cognitive:  Immediate object recall: 4/4  Recall 4 objects at 5 minutes:4/4  Reverse months of the year: Yes    Spell world backwards: Yes    Backwards number string:  Yes  With great delay    Time spent in one-on-one evaluation and discussion with patient regarding nature of problem, course, prior treatments, and therapeutic options, 75% of this 60 minute visit  was spent in counseling including this patients personal symptom triggers and education thereof.

## 2017-05-05 NOTE — NURSING NOTE
"No chief complaint on file.      Vitals:    05/05/17 1417   BP: 128/83   BP Location: Left arm   Patient Position: Chair   Cuff Size: Adult Regular   Pulse: 120   Temp: 98.2  F (36.8  C)   SpO2: 99%   Weight: 152 lb   Height: 5' 8.11\"       Body mass index is 23.04 kg/(m^2).    Celeste Herrera MA                          "

## 2017-05-06 ASSESSMENT — PATIENT HEALTH QUESTIONNAIRE - PHQ9: SUM OF ALL RESPONSES TO PHQ QUESTIONS 1-9: 15

## 2017-05-11 ENCOUNTER — OFFICE VISIT (OUTPATIENT)
Dept: FAMILY MEDICINE | Facility: CLINIC | Age: 62
End: 2017-05-11
Payer: COMMERCIAL

## 2017-05-11 ENCOUNTER — TELEPHONE (OUTPATIENT)
Dept: SURGERY | Facility: CLINIC | Age: 62
End: 2017-05-11

## 2017-05-11 VITALS
DIASTOLIC BLOOD PRESSURE: 84 MMHG | BODY MASS INDEX: 23.01 KG/M2 | TEMPERATURE: 98 F | SYSTOLIC BLOOD PRESSURE: 148 MMHG | WEIGHT: 151.8 LBS | OXYGEN SATURATION: 99 % | HEART RATE: 110 BPM

## 2017-05-11 DIAGNOSIS — Z12.31 VISIT FOR SCREENING MAMMOGRAM: ICD-10-CM

## 2017-05-11 DIAGNOSIS — F07.81 POSTCONCUSSION SYNDROME: Primary | ICD-10-CM

## 2017-05-11 DIAGNOSIS — F43.23 ADJUSTMENT DISORDER WITH MIXED ANXIETY AND DEPRESSED MOOD: ICD-10-CM

## 2017-05-11 DIAGNOSIS — S06.0X1A CONCUSSION, WITH LOSS OF CONSCIOUSNESS OF 30 MINUTES OR LESS, INITIAL ENCOUNTER: ICD-10-CM

## 2017-05-11 DIAGNOSIS — I10 ESSENTIAL HYPERTENSION WITH GOAL BLOOD PRESSURE LESS THAN 140/90: ICD-10-CM

## 2017-05-11 DIAGNOSIS — Z83.79 FAMILY HISTORY OF CELIAC DISEASE: ICD-10-CM

## 2017-05-11 DIAGNOSIS — Z78.0 ASYMPTOMATIC POSTMENOPAUSAL STATUS: ICD-10-CM

## 2017-05-11 DIAGNOSIS — S09.90XA CLOSED HEAD INJURY, INITIAL ENCOUNTER: ICD-10-CM

## 2017-05-11 DIAGNOSIS — R10.32 ABDOMINAL PAIN, LEFT LOWER QUADRANT: ICD-10-CM

## 2017-05-11 DIAGNOSIS — S06.0X9A CONCUSSION WITH LOSS OF CONSCIOUSNESS, UNSPECIFIED DURATION, INITIAL ENCOUNTER: ICD-10-CM

## 2017-05-11 DIAGNOSIS — K58.2 IRRITABLE BOWEL SYNDROME WITH BOTH CONSTIPATION AND DIARRHEA: ICD-10-CM

## 2017-05-11 DIAGNOSIS — Z11.59 NEED FOR HEPATITIS C SCREENING TEST: ICD-10-CM

## 2017-05-11 DIAGNOSIS — G47.00 INSOMNIA, UNSPECIFIED TYPE: ICD-10-CM

## 2017-05-11 LAB
ALBUMIN UR-MCNC: NEGATIVE MG/DL
APPEARANCE UR: CLEAR
BASOPHILS # BLD AUTO: 0 10E9/L (ref 0–0.2)
BASOPHILS NFR BLD AUTO: 0.5 %
BILIRUB UR QL STRIP: NEGATIVE
COLOR UR AUTO: YELLOW
DIFFERENTIAL METHOD BLD: NORMAL
EOSINOPHIL # BLD AUTO: 0.2 10E9/L (ref 0–0.7)
EOSINOPHIL NFR BLD AUTO: 2.1 %
ERYTHROCYTE [DISTWIDTH] IN BLOOD BY AUTOMATED COUNT: 13.6 % (ref 10–15)
GLUCOSE UR STRIP-MCNC: NEGATIVE MG/DL
HCT VFR BLD AUTO: 42.1 % (ref 35–47)
HGB BLD-MCNC: 14.1 G/DL (ref 11.7–15.7)
HGB UR QL STRIP: NEGATIVE
KETONES UR STRIP-MCNC: NEGATIVE MG/DL
LEUKOCYTE ESTERASE UR QL STRIP: NEGATIVE
LYMPHOCYTES # BLD AUTO: 1.7 10E9/L (ref 0.8–5.3)
LYMPHOCYTES NFR BLD AUTO: 19.4 %
MCH RBC QN AUTO: 29.7 PG (ref 26.5–33)
MCHC RBC AUTO-ENTMCNC: 33.5 G/DL (ref 31.5–36.5)
MCV RBC AUTO: 89 FL (ref 78–100)
MONOCYTES # BLD AUTO: 0.7 10E9/L (ref 0–1.3)
MONOCYTES NFR BLD AUTO: 8.5 %
NEUTROPHILS # BLD AUTO: 6 10E9/L (ref 1.6–8.3)
NEUTROPHILS NFR BLD AUTO: 69.5 %
NITRATE UR QL: NEGATIVE
PH UR STRIP: 7.5 PH (ref 5–7)
PLATELET # BLD AUTO: 315 10E9/L (ref 150–450)
RBC # BLD AUTO: 4.74 10E12/L (ref 3.8–5.2)
SP GR UR STRIP: 1.01 (ref 1–1.03)
URN SPEC COLLECT METH UR: ABNORMAL
UROBILINOGEN UR STRIP-ACNC: 0.2 EU/DL (ref 0.2–1)
WBC # BLD AUTO: 8.7 10E9/L (ref 4–11)

## 2017-05-11 PROCEDURE — 82043 UR ALBUMIN QUANTITATIVE: CPT | Performed by: FAMILY MEDICINE

## 2017-05-11 PROCEDURE — 99215 OFFICE O/P EST HI 40 MIN: CPT | Performed by: FAMILY MEDICINE

## 2017-05-11 PROCEDURE — 86803 HEPATITIS C AB TEST: CPT | Performed by: FAMILY MEDICINE

## 2017-05-11 PROCEDURE — 81003 URINALYSIS AUTO W/O SCOPE: CPT | Performed by: FAMILY MEDICINE

## 2017-05-11 PROCEDURE — 80053 COMPREHEN METABOLIC PANEL: CPT | Performed by: FAMILY MEDICINE

## 2017-05-11 PROCEDURE — 85025 COMPLETE CBC W/AUTO DIFF WBC: CPT | Performed by: FAMILY MEDICINE

## 2017-05-11 PROCEDURE — 36415 COLL VENOUS BLD VENIPUNCTURE: CPT | Performed by: FAMILY MEDICINE

## 2017-05-11 RX ORDER — AMITRIPTYLINE HYDROCHLORIDE 10 MG/1
TABLET ORAL
Qty: 60 TABLET | Refills: 1 | Status: SHIPPED | OUTPATIENT
Start: 2017-05-11 | End: 2017-05-19 | Stop reason: SINTOL

## 2017-05-11 NOTE — TELEPHONE ENCOUNTER
Please see message below     Please advise   Thank you     Shani Bunch RN, BSN  [facility]

## 2017-05-11 NOTE — PATIENT INSTRUCTIONS
Back off on your fiber intake right now - go down on your benefiber to 1 or 2x/day and see how you feel. If better, then add back your cheerios or other whole grain cereal.  Try over the counter Beano with meals to help digest vegetable and fruit fibers.  Start adding back in fruits and veggies in small amounts and just very gradually increase amounts.      Decrease culturelle to once a day.     Since Concussion clinic recommended not increasing amitryptiline to 30mg nightly,please call them to see what they would recommend for sleep.     Try senekot or generic senna laxative at night as needed if you get constipated. Avoid strong stimulant laxatives right now.     Look in pharmacy for breath-assure or mint-assure capsules  - also available through amazon.com - 2 capsules for stomach upset - has parsley oil in it.     Please, call or return to clinic or go to the ER immediately if signs or symptoms worsen or fail to improve as anticipated.     If your symptoms aren't improving or are worse, please call me [phone].                         Concussion  What is a concussion?   A concussion is an injury to the brain caused by a blow to the head. A concussion may cause you to become temporarily confused or disoriented, have memory loss (amnesia), or become unconscious.   Concussions are the most common head injuries in sports.   How does it occur?   A concussion occurs when a blow to the head causes shaking, jarring, stretching, swelling, or tearing of brain tissue and delicate nerve fibers.   What are the symptoms?   If you have had a concussion you may have any of the following symptoms:   headache   confusion   memory loss (amnesia)   loss of consciousness   sleepiness   nausea or vomiting   trouble thinking   dizziness   weakness   seizures   loss of balance   blurred vision   sensitivity to light   You may have these symptoms for several days, weeks, or longer after the injury. This is called post-concussive  syndrome.   If your neck hurts after a head injury, it is best to try not to move more than is necessary until it is checked by a healthcare provider. Anyone with a possibly serious neck injury should not move at all and an ambulance should be called.   How is it diagnosed?   Your healthcare provider will examine you and find out what happened. If you can't remember what happened, he or she may need to get this information from other people saw the accident. Your healthcare provider will:   do a neurologic exam   test your strength   test your memory   test your sensation, balance, and reflexes   check your vision   look at your eyes with a flashlight to see if your pupils are the same size   You may be tested again several times during the next hour to check for any worsening of brain function, which might occur if you have any bleeding or swelling in the brain.   Your provider may do a CT scan or an MRI of your head to be sure there is no damage to your brain. Depending on how your head injury occurred, you may have neck X-rays to check your spine.   How is it treated?   The treatment for a concussion is rest. This means you may need to miss school, work, or other activities. Exercising too soon will make your symptoms last longer and may cause more problems. Limit activities that require thinking and concentration, such as, working on a computer or playing video games. Your brain needs to rest.   Headache may be treated with a mild pain reliever, such as acetaminophen. Nausea may be treated with a prescription medicine. Clear liquids or bland foods may be helpful.   If you have had a concussion, you need to be watched by a friend or relative for 8 to 12 hours. You should be awakened and checked every 2 to 4 hours while sleeping. Symptoms to report to your healthcare provider include:   confusion   seizures   unequal pupil sizes   restlessness or irritability   trouble using your legs or arms   worsening vomiting    headache that will not go away after taking acetaminophen (Tylenol)   garbled speech   bleeding from the ears or nose   decreasing alertness   unusual sleepiness or hard to wake up   a change in personality   If you are stable and recovering during the next 24 hours, you should rest for an another day or two. As your symptoms go away, you can begin to go back to your usual daily routine. However, you should stay away from any activities that would risk reinjury. A second concussion before the first one has healed could be very serious. Your healthcare provider will tell you when it is safe to return to sports and other activities.   How can I prevent a concussion?   It is very important to understand that receiving a second blow to the head before the first injury is fully healed can be fatal, even if the second injury seems minor.   Using proper equipment (such as helmets and seat belts) and following proper techniques in sports such as football and soccer are the best ways to prevent concussions.     Published by [employer].  This content is reviewed periodically and is subject to change as new health information becomes available. The information is intended to inform and educate and is not a replacement for medical evaluation, advice, diagnosis or treatment by a healthcare professional.   Written by Orion Aleman MD, and Tamanna Mark MD, for [employer].   ? 2010 [facility] and/or its affiliates. All Rights Reserved.   Copyright   [employer] 2011    Graduated return to play protocol after concussion  Rehabilitation stage  Functional exercise at each stage of rehabilitation  Objective of each stage    1. No activity Complete physical and cognitive rest Recovery   2. Light aerobic exercise Walking, swimming or stationary cycling keeping intensity <70 percent HR; no resistance training Increase HR   3. Sport-specific exercise Skating drills in ice hockey, running drills in soccer; no head  impact activities Add movement   4. Non-contact training drills Progression to more complex training drills, eg, passing drills in football and ice hockey; may start progressive resistance training Exercise, coordination, and cognitive load   5. Full contact practice Following medical clearance, participate in normal training activities Restore confidence and assess functional skills by coaching staff   6. Return to play Normal game play    Six-day return to play protocol. Each day the athlete makes a stepwise increase in functional activity, is evaluated for symptoms, and is allowed to progress to the next stage each successive day if asymptomatic.   Clin J Sport Med 2009; 19:185. Copyright   2009 [employer].    Follow up with concussion clinic as scheduled in early June.     Please, call or return to clinic or go to the ER immediately if signs or symptoms worsen or fail to improve as anticipated.                Thank you for choosing [facility]  for your Health Care. It was a pleasure seeing you at your visit today. Please contact us with any questions or concerns you may have.                   Helga Ibarra MD                                  To reach your [facility] care team after hours call:   [phone]    Our clinic hours are:     Monday- 7:30 am - 7:00 pm                             Tuesday through Friday- 7:30 am - 5:00 pm                                        Saturday- 8:00 am - 12:00 pm                  Phone:  [phone]    Our pharmacy hours are:     Monday  8:00 am to 7:00 pm      Tuesday through Friday 8:00am to 6:00pm                        Saturday - 9:00 am to 1:00 pm      Sunday : Closed.              Phone:  [phone]      There is also information available at our web site:  [email or web address]    If your provider ordered any lab tests and you do not receive the results within 10 business days, please call the clinic.    If  you need a medication refill please contact your pharmacy.  Please allow 2 business days for your refill to be completed.    Our clinic offers telephone visits and e visits.  Please ask one of your team members to explain more.      Use [employer] (secure email communication and access to your chart) to send your primary care provider a message or make an appointment. Ask someone on your Team how to sign up for [employer].

## 2017-05-11 NOTE — MR AVS SNAPSHOT
After Visit Summary   5/11/2017    Jolynn Haji    MRN: [id number]           Patient Information     Date Of Birth          1955        Visit Information        Provider Department      5/11/2017 10:15 AM Helga Ibarra MD [facility]        Today's Diagnoses     Postconcussion syndrome    -  1    Irritable bowel syndrome with both constipation and diarrhea- since diarrheal illness mid 4/2017        Closed head injury, initial encounter        Abdominal pain, left lower quadrant        Visit for screening mammogram        Need for hepatitis C screening test        Essential hypertension with goal blood pressure less than 140/90        Asymptomatic postmenopausal status          Care Instructions    Back off on your fiber intake right now - go down on your benefiber to 1 or 2x/day and see how you feel. If better, then add back your cheerios or other whole grain cereal.  Try over the counter Beano with meals to help digest vegetable and fruit fibers.  Start adding back in fruits and veggies in small amounts and just very gradually increase amounts.      Decrease culturelle to once a day.     Since Concussion clinic recommended not increasing amitryptiline to 30mg nightly,please call them to see what they would recommend for sleep.     Try senekot or generic senna laxative at night as needed if you get constipated. Avoid strong stimulant laxatives right now.     Look in pharmacy for breath-assure or mint-assure capsules  - also available through amazon.com - 2 capsules for stomach upset - has parsley oil in it.     Please, call or return to clinic or go to the ER immediately if signs or symptoms worsen or fail to improve as anticipated.     If your symptoms aren't improving or are worse, please call me [phone].                         Concussion  What is a concussion?   A concussion is an injury to the brain caused by a blow to the head. A concussion may cause you to  become temporarily confused or disoriented, have memory loss (amnesia), or become unconscious.   Concussions are the most common head injuries in sports.   How does it occur?   A concussion occurs when a blow to the head causes shaking, jarring, stretching, swelling, or tearing of brain tissue and delicate nerve fibers.   What are the symptoms?   If you have had a concussion you may have any of the following symptoms:   headache   confusion   memory loss (amnesia)   loss of consciousness   sleepiness   nausea or vomiting   trouble thinking   dizziness   weakness   seizures   loss of balance   blurred vision   sensitivity to light   You may have these symptoms for several days, weeks, or longer after the injury. This is called post-concussive syndrome.   If your neck hurts after a head injury, it is best to try not to move more than is necessary until it is checked by a healthcare provider. Anyone with a possibly serious neck injury should not move at all and an ambulance should be called.   How is it diagnosed?   Your healthcare provider will examine you and find out what happened. If you can't remember what happened, he or she may need to get this information from other people saw the accident. Your healthcare provider will:   do a neurologic exam   test your strength   test your memory   test your sensation, balance, and reflexes   check your vision   look at your eyes with a flashlight to see if your pupils are the same size   You may be tested again several times during the next hour to check for any worsening of brain function, which might occur if you have any bleeding or swelling in the brain.   Your provider may do a CT scan or an MRI of your head to be sure there is no damage to your brain. Depending on how your head injury occurred, you may have neck X-rays to check your spine.   How is it treated?   The treatment for a concussion is rest. This means you may need to miss school, work, or other activities.  Exercising too soon will make your symptoms last longer and may cause more problems. Limit activities that require thinking and concentration, such as, working on a computer or playing video games. Your brain needs to rest.   Headache may be treated with a mild pain reliever, such as acetaminophen. Nausea may be treated with a prescription medicine. Clear liquids or bland foods may be helpful.   If you have had a concussion, you need to be watched by a friend or relative for 8 to 12 hours. You should be awakened and checked every 2 to 4 hours while sleeping. Symptoms to report to your healthcare provider include:   confusion   seizures   unequal pupil sizes   restlessness or irritability   trouble using your legs or arms   worsening vomiting   headache that will not go away after taking acetaminophen (Tylenol)   garbled speech   bleeding from the ears or nose   decreasing alertness   unusual sleepiness or hard to wake up   a change in personality   If you are stable and recovering during the next 24 hours, you should rest for an another day or two. As your symptoms go away, you can begin to go back to your usual daily routine. However, you should stay away from any activities that would risk reinjury. A second concussion before the first one has healed could be very serious. Your healthcare provider will tell you when it is safe to return to sports and other activities.   How can I prevent a concussion?   It is very important to understand that receiving a second blow to the head before the first injury is fully healed can be fatal, even if the second injury seems minor.   Using proper equipment (such as helmets and seat belts) and following proper techniques in sports such as football and soccer are the best ways to prevent concussions.     Published by [employer].  This content is reviewed periodically and is subject to change as new health information becomes available. The information is intended to inform and  educate and is not a replacement for medical evaluation, advice, diagnosis or treatment by a healthcare professional.   Written by Orion Aleman MD, and Tamanna Mark MD, for [facility].   ? 2010 [facility] and/or its affiliates. All Rights Reserved.   Copyright   Clinical Reference Systems 2011    Graduated return to play protocol after concussion  Rehabilitation stage  Functional exercise at each stage of rehabilitation  Objective of each stage    1. No activity Complete physical and cognitive rest Recovery   2. Light aerobic exercise Walking, swimming or stationary cycling keeping intensity <70 percent HR; no resistance training Increase HR   3. Sport-specific exercise Skating drills in ice hockey, running drills in soccer; no head impact activities Add movement   4. Non-contact training drills Progression to more complex training drills, eg, passing drills in football and ice hockey; may start progressive resistance training Exercise, coordination, and cognitive load   5. Full contact practice Following medical clearance, participate in normal training activities Restore confidence and assess functional skills by coaching staff   6. Return to play Normal game play    Six-day return to play protocol. Each day the athlete makes a stepwise increase in functional activity, is evaluated for symptoms, and is allowed to progress to the next stage each successive day if asymptomatic.   Clin J Sport Med 2009; 19:185. Copyright   2009 [employer].    Follow up with concussion clinic as scheduled in early June.     Please, call or return to clinic or go to the ER immediately if signs or symptoms worsen or fail to improve as anticipated.                Thank you for choosing [facility]  for your Health Care. It was a pleasure seeing you at your visit today. Please contact us with any questions or concerns you may have.                   Helga Ibarra MD                                   To reach your [facility] care team after hours call:   [phone]    Our clinic hours are:     Monday- 7:30 am - 7:00 pm                             Tuesday through Friday- 7:30 am - 5:00 pm                                        Saturday- 8:00 am - 12:00 pm                  Phone:  [phone]    Our pharmacy hours are:     Monday  8:00 am to 7:00 pm      Tuesday through Friday 8:00am to 6:00pm                        Saturday - 9:00 am to 1:00 pm      Sunday : Closed.              Phone:  [phone]      There is also information available at our web site:  [email or web address]    If your provider ordered any lab tests and you do not receive the results within 10 business days, please call the clinic.    If you need a medication refill please contact your pharmacy.  Please allow 2 business days for your refill to be completed.    Our clinic offers telephone visits and e visits.  Please ask one of your team members to explain more.      Use [employer] (secure email communication and access to your chart) to send your primary care provider a message or make an appointment. Ask someone on your Team how to sign up for [email or web address].                           Follow-ups after your visit        Your next 10 appointments already scheduled     Jun 09, 2017  1:30 PM CDT   (Arrive by 1:15 PM)   RETURN CONCUSSION with FORTINO Edmond CNP [facility]cussion ([facility])    [address] [place] [id number] [phone]              Who to contact     If you have questions or need follow up information about today's clinic visit or your schedule please contact [facility] directly at [phone].  Normal or non-critical lab and imaging results will be communicated to you by MyChart, letter or phone within 4 business days after the clinic has received the results. If you do not hear from us within 7 days, please contact the  clinic through [facility] or phone. If you have a critical or abnormal lab result, we will notify you by phone as soon as possible.  Submit refill requests through [employer]IA or call your pharmacy and they will forward the refill request to us. Please allow 3 business days for your refill to be completed.          Additional Information About Your Visit        [employer] System Information     [employer]IA gives you secure access to your electronic health record. If you see a primary care provider, you can also send messages to your care team and make appointments. If you have questions, please call your primary care clinic.  If you do not have a primary care provider, please call [phone] and they will assist you.        Care EveryWhere ID     This is your Care EveryWhere ID. This could be used by other organizations to access your [facility] medical records  [id number]        Your Vitals Were     Pulse Temperature Last Period Pulse Oximetry BMI (Body Mass Index)       110 98  F (36.7  C) (Oral) 06/28/2001 99% 23.01 kg/m2        Blood Pressure from Last 3 Encounters:   05/11/17 148/84   05/05/17 128/83   04/29/17 104/72    Weight from Last 3 Encounters:   05/11/17 151 lb 12.8 oz (68.9 kg)   05/05/17 152 lb (68.9 kg)   04/29/17 150 lb (68 kg)              Today, you had the following     No orders found for display         Today's Medication Changes          These changes are accurate as of: 5/11/17 12:17 PM.  If you have any questions, ask your nurse or doctor.               These medicines have changed or have updated prescriptions.        Dose/Directions    lisinopril 5 MG tablet   Commonly known as:  PRINIVIL/ZESTRIL   This may have changed:  how much to take   Used for:  Essential hypertension with goal blood pressure less than 140/90        Dose:  5 mg   Take 1 tablet (5 mg) by mouth daily   Quantity:  30 tablet   Refills:  1                Primary Care Provider Office Phone # Fax #    Helga Ibarra [phone]  [phone]       [facility] [address] [address]        Thank you!     Thank you for choosing [facility]  for your care. Our goal is always to provide you with excellent care. Hearing back from our patients is one way we can continue to improve our services. Please take a few minutes to complete the written survey that you may receive in the mail after your visit with us. Thank you!             Your Updated Medication List - Protect others around you: Learn how to safely use, store and throw away your medicines at www.disposemymeds.org.          This list is accurate as of: 5/11/17 12:17 PM.  Always use your most recent med list.                   Brand Name Dispense Instructions for use    acetaminophen 500 MG tablet    TYLENOL    100 tablet    Take 2 tablets (1,000 mg) by mouth every 6 hours as needed for mild pain       albuterol 108 (90 BASE) MCG/ACT Inhaler    PROAIR HFA/PROVENTIL HFA/VENTOLIN HFA    1 Inhaler    Inhale 2 puffs into the lungs every 6 hours as needed for shortness of breath / dyspnea       amitriptyline 10 MG tablet    ELAVIL    45 tablet    Start at 1 tab at bedtime for 3 days, then take 2 tabs at bedtime       fluticasone 110 MCG/ACT Inhaler    FLOVENT HFA    1 Inhaler    Inhale 2 puffs into the lungs 2 times daily 2 puffs       fluticasone 50 MCG/ACT spray    FLONASE    1 Bottle    Spray 1-2 sprays into both nostrils daily       lisinopril 5 MG tablet    PRINIVIL/ZESTRIL    30 tablet    Take 1 tablet (5 mg) by mouth daily       loratadine 10 MG capsule     30 capsule    Take 10 mg by mouth daily       melatonin 5 MG Caps      Take 5 mg by mouth daily as needed Take 4 hours prior to desired sleep       metroNIDAZOLE 0.75 % cream    METROCREAM    45 g    APPLY TOPICALLY TWO TIMES A DAY TO FACE       naproxen sodium 220 MG tablet    ANAPROX    60 tablet    Take 1-2 tablets twice a day as needed for headache       ondansetron 4 MG ODT tab     ZOFRAN ODT    20 tablet    Take 1-2 tablets (4-8 mg) by mouth every 8 hours as needed for nausea

## 2017-05-11 NOTE — PROGRESS NOTES
SUBJECTIVE:                                                    Jolynn Haji is a 61 year old female who presents to clinic today for the following health issues:intoday with her , Zach Haji.     ABDOMINAL PAIN     Onset: x1 month - getting worse - since starting to Take  culturelle and taking benefiber on Tuesday.     Description:   Character: Dull ache  Location: left lower quadrant - only when she needs to pass gas - doesn't hurt in the am's, but does in the evening.   Radiation: None    Intensity: moderate    Progression of Symptoms:  worsening    Accompanying Signs & Symptoms:  Fever/Chills?: no   Gas/Bloating: YES - pain in lower left abdomen  Nausea: no   Vomitting: no   Diarrhea?: YES- only once and it was induced  Constipation:YES  Dysuria or Hematuria: no    History:   Trauma: no   Previous similar pain: YES - most recently when having diarrhea  Previous tests done: none    Precipitating factors:   Does the pain change with:   Always comes late afternoon into the evening    Food: no      BM: no     Urination: no   Doesn't really have an appetite.  Forcing herself to eat. Eating bland foods - chicken noodle soup, mashed potatoes, toast, crackers.   Had beef and chicken and pork and peanut butter. Eating applesauce and bananas . Was eating yogurt until concussion specialist recommended no dairy.     Alleviating factors:  Gas-x helps.     Therapies Tried and outcome: Gas-x - unsure of how much relief it is providing. Also decreased dairy intake.    LMP:  not applicable   Had a concussion after falling in the bathroom after a diarrheal episode on 4/14/2017. Getting left temporal headaches now - has on and off for many years when she is overtired - pt not sleeping well since hitting her head on  4/14/2017 - - hit right occipital area when she fell in her bathroom.  Can't sleep well since the fall.  Not taking naps at all during the day.  Saw Dr. Patrick Melgar here in our office - on 4/24/2017 after being  "seen in the ED for the fall/concussion on 4/14/2017., then saw [facility] concussion specialist GABRIELLA , Magdalena Santana RN, CNP at [facility] on 5/5/2017- that note is not finished as yet- pt was rx'd Amitryptiline 10mg for sleep - still wasn't sleeping well - then increased to 2 -10mg tabs nightly - now falling asleep fairly well and sleeping from 4-6 hrs /night.      Also mentioned to the concussion specialist that her stomach has been gurgling a lot with a lot of gas since her 2 week episode of diarrhea last month - passes gas and feels a bit better.  Eliminated dairy and that didn't seem to help.  Diarrhea is gone. Hadn't been able to collect stool samples for diarrhea prior to that.  Now has hemorrhoids secondary to constipation on and off alternating with  the diarrhea.     Hasn't been able to exercise secondary to her headaches and postconcussion syndrome. That is very frustrating as well.  Tried to walk the dog longer and then felt really tired and had a headache . Has felt down and frustrated and a bit anxious with all the above- \"I feel like I'm a burden to my family.\"     Problem list and histories reviewed & adjusted, as indicated.  Additional history: as documented    Reviewed and updated as needed this visit by clinical staff  Tobacco  Allergies  Meds  Med Hx  Surg Hx  Fam Hx  Soc Hx      Reviewed and updated as needed this visit by Provider       Patient Active Problem List   Diagnosis     Essential hypertension with goal blood pressure less than 140/90     Hidradenitis     CELIAC DISEASE- family hx of      Localized osteoarthritis of hand     Synovial cyst of popliteal space     DERMATOPHYTOSIS OF NAIL- toenails      Lipidosis     Osteopenia     Postmenopausal     NUMBNESS AND TINGLING OF FOOT - bilateral -mild      CARDIOVASCULAR SCREENING; LDL GOAL LESS THAN 160     Numbness of feet- distal feet R>L      Advanced directives, counseling/discussion     Stress incontinence, female - mild     Cough     Acute " bronchitis- recurrent- ? related to mold in school building- pt has since retired and no further bronchitis     Rosacea     History of migraine headaches- assoc. with nausea/vomiting - resolved around menopause - were  premenstrual      Dupuytren's disease of palm - right 4th digit flexor tendon     Sun-damaged skin     Seasonal allergic rhinitis     Basal cell carcinoma of leg, right     Concussion with loss of consciousness, unspecified duration, initial encounter       Current Outpatient Prescriptions   Medication Sig Dispense Refill     amitriptyline (ELAVIL) 10 MG tablet Start at 1 tab at bedtime for 3 days, then take 2 tabs at bedtime 45 tablet 1     acetaminophen (TYLENOL) 500 MG tablet Take 2 tablets (1,000 mg) by mouth every 6 hours as needed for mild pain 100 tablet 0     naproxen sodium (ANAPROX) 220 MG tablet Take 1-2 tablets twice a day as needed for headache 60 tablet      melatonin 5 MG CAPS Take 5 mg by mouth daily as needed Take 4 hours prior to desired sleep       ondansetron (ZOFRAN ODT) 4 MG ODT tab Take 1-2 tablets (4-8 mg) by mouth every 8 hours as needed for nausea 20 tablet 1     lisinopril (PRINIVIL/ZESTRIL) 5 MG tablet Take 1 tablet (5 mg) by mouth daily (Patient taking differently: Take 10 mg by mouth daily ) 30 tablet 1     fluticasone (FLONASE) 50 MCG/ACT spray Spray 1-2 sprays into both nostrils daily 1 Bottle 11     loratadine 10 MG capsule Take 10 mg by mouth daily 30 capsule      albuterol (PROAIR HFA, PROVENTIL HFA, VENTOLIN HFA) 108 (90 BASE) MCG/ACT inhaler Inhale 2 puffs into the lungs every 6 hours as needed for shortness of breath / dyspnea 1 Inhaler 5     fluticasone (FLOVENT HFA) 110 MCG/ACT inhaler Inhale 2 puffs into the lungs 2 times daily 2 puffs 1 Inhaler 12     metroNIDAZOLE (METROCREAM) 0.75 % cream APPLY TOPICALLY TWO TIMES A DAY TO FACE 45 g 5          Allergies   Allergen Reactions     Augmentin Itching     itching, rash     Fosamax Diarrhea     Diarrhea, stomach  cramps, jaw pain           ROS:   ROS: 12 point ROS neg other than the symptoms noted above    OBJECTIVE:                                                    /84 (BP Location: Right arm, Patient Position: Chair, Cuff Size: Adult Regular)  Pulse 110  Temp 98  F (36.7  C) (Oral)  Wt 151 lb 12.8 oz (68.9 kg)  LMP 06/28/2001  SpO2 99%  BMI 23.01 kg/m2  Body mass index is 23.01 kg/(m^2).   GENERAL: healthy, alert, well nourished, well hydrated, no distress  HENT: ear canals- normal; TMs- normal; Nose- normal; Mouth- no ulcers, no lesions  NECK: no tenderness, no adenopathy, no asymmetry, no masses, no stiffness; thyroid- normal to palpation  RESP: lungs clear to auscultation - no rales, no rhonchi, no wheezes  CV: regular rates and rhythm, normal S1 S2, no S3 or S4 and no murmur, no click or rub -  ABDOMEN: soft, no tenderness, no  hepatosplenomegaly, no masses, normal bowel sounds  MS: extremities- no gross deformities noted, no edema  NEURO: strength and tone- normal, sensory exam- grossly normal, mentation- intact, speech- normal, reflexes- symmetric  Alert and oriented. No acute distress. Appears well-groomed and casually dressed. Affect is normal, not particularly depressed. In good humor and laughs appropriately. Not particularly anxious. No evidence of psychosis.   Diagnostic test results:  Results for orders placed or performed in visit on 05/11/17 (from the past 24 hour(s))   UA reflex to Microscopic   Result Value Ref Range    Color Urine Yellow     Appearance Urine Clear     Glucose Urine Negative NEG mg/dL    Bilirubin Urine Negative NEG    Ketones Urine Negative NEG mg/dL    Specific Gravity Urine 1.015 1.003 - 1.035    Blood Urine Negative NEG    pH Urine 7.5 (H) 5.0 - 7.0 pH    Protein Albumin Urine Negative NEG mg/dL    Urobilinogen Urine 0.2 0.2 - 1.0 EU/dL    Nitrite Urine Negative NEG    Leukocyte Esterase Urine Negative NEG    Source Midstream Urine    CBC with platelets differential    Result Value Ref Range    WBC 8.7 4.0 - 11.0 10e9/L    RBC Count 4.74 3.8 - 5.2 10e12/L    Hemoglobin 14.1 11.7 - 15.7 g/dL    Hematocrit 42.1 35.0 - 47.0 %    MCV 89 78 - 100 fl    MCH 29.7 26.5 - 33.0 pg    MCHC 33.5 31.5 - 36.5 g/dL    RDW 13.6 10.0 - 15.0 %    Platelet Count 315 150 - 450 10e9/L    Diff Method Automated Method     % Neutrophils 69.5 %    % Lymphocytes 19.4 %    % Monocytes 8.5 %    % Eosinophils 2.1 %    % Basophils 0.5 %    Absolute Neutrophil 6.0 1.6 - 8.3 10e9/L    Absolute Lymphocytes 1.7 0.8 - 5.3 10e9/L    Absolute Monocytes 0.7 0.0 - 1.3 10e9/L    Absolute Eosinophils 0.2 0.0 - 0.7 10e9/L    Absolute Basophils 0.0 0.0 - 0.2 10e9/L      Called pt on her cell phone  with the above results.   ASSESSMENT/PLAN:                                                        ICD-10-CM    1. Postconcussion syndrome F07.81    2. Concussion with loss of consciousness, unspecified duration, initial encounter S06.0X9A    3. Irritable bowel syndrome with both constipation and diarrhea- since diarrheal illness mid 4/2017 K58.2 GASTROENTEROLOGY ADULT REF CONSULT ONLY     Deamidated Gliadin Peptide Gaurang IgA IgG     ENDOMYSIAL ANTIBODY TITER     Gastrin     IgA     Tissue transglutaminase gaurang IgA and IgG     Vasoactive intestinal peptide   4. Closed head injury, initial encounter S09.90XA    5. Abdominal pain, left lower quadrant R10.32 UA reflex to Microscopic     CBC with platelets differential     GASTROENTEROLOGY ADULT REF CONSULT ONLY     CT Abdomen Pelvis w Contrast     Comprehensive metabolic panel     Deamidated Gliadin Peptide Gaurang IgA IgG     ENDOMYSIAL ANTIBODY TITER     Gastrin     IgA     Tissue transglutaminase gaurang IgA and IgG     Vasoactive intestinal peptide   6. Visit for screening mammogram Z12.31    7. Need for hepatitis C screening test Z11.59 Hepatitis C Screen Reflex to HCV RNA Quant and Genotype   8. Essential hypertension with goal blood pressure less than 140/90 I10 Albumin Random Urine  Quantitative     UA reflex to Microscopic   9. Asymptomatic postmenopausal status Z78.0    10. Adjustment disorder with mixed anxiety and depressed mood- mild at this time F43.23    11. Family history of celiac disease Z83.79 Deamidated Gliadin Peptide Gaurang IgA IgG     ENDOMYSIAL ANTIBODY TITER     Gastrin     IgA     Tissue transglutaminase gaurang IgA and IgG     Vasoactive intestinal peptide     Had lost 13 pounds since onset of diarrhea in early 4/2017. Wt now stable.     Per [facility]'s suggestion, will get celiac labs as well.   Wt Readings from Last 5 Encounters:   05/11/17 151 lb 12.8 oz (68.9 kg)   05/05/17 152 lb (68.9 kg)   04/29/17 150 lb (68 kg)   04/24/17 153 lb (69.4 kg)   04/12/17 152 lb 12.8 oz (69.3 kg)     Back off on your high fiber intake right now - pt went from a very low fiber diet to high fiber diet in a short period of time - go down on your benefiber to 1 or 2x/day and see how you feel. If better, then add back your cheerios or other whole grain cereal.  Try over the counter Beano with meals to help digest vegetable and fruit fibers.  Start adding back in fruits and veggies in small amounts and just very gradually increase amounts.      Decrease culturelle to once a day. Didn't discuss FODMAPS foods as yet today, will do so at next visit , if still having problems.     Since Concussion clinic recommended not increasing amitryptiline to 30mg nightly,please call them to see what they would recommend for your sleep.      Pt declines a colonoscopy right now secondary to nausea and feeling like she cannot tolerate the prep right now. She's due for 10 year colonoscopy 8/2007.  She will consider doing it sooner , if directed to by [facility].     CBC RESULTS:   Recent Labs   Lab Test  04/24/17   1604   WBC  8.4   RBC  4.56   HGB  13.4   HCT  40.3   MCV  88   MCH  29.4   MCHC  33.3   RDW  13.1   PLT  317     Spent 50 minutes on pt care today. All face to face time from 11:30am  to 12:19pm .  Greater  than 50% of time spent in coordination of care/counseling today re:   1. Postconcussion syndrome    2. Concussion with loss of consciousness, unspecified duration, initial encounter    3. Irritable bowel syndrome with both constipation and diarrhea- since diarrheal illness mid 4/2017    4. Closed head injury, initial encounter    5. Abdominal pain, left lower quadrant    6. Visit for screening mammogram    7. Need for hepatitis C screening test    8. Essential hypertension with goal blood pressure less than 140/90    9. Asymptomatic postmenopausal status    10. Adjustment disorder with mixed anxiety and depressed mood- mild at this time    11. Family history of celiac disease      Pt has had a hard time getting a hold of me/our clinic to talk with a physician or other provider when she needs to.  Gave pt my cell phone number to call , if needed.     We discussed her mild adjustment disorder symptoms, including anxiety and mild depression symptoms right now, roger since she's been really healthy her whole adult life. She declines any medication or counseling for this at this time, but will consider both. Discussed in detail the frustrating nature or both intestinal issues and concussion syndrome issues.  She declines referral for PT/OT and neuropsychology right now as well.     Please, call or return to clinic or go to the ER immediately if signs or symptoms worsen or fail to improve as anticipated.    See Patient Instructions. Follow up with me in          Helga Ibarra MD    [facility] [place]

## 2017-05-11 NOTE — TELEPHONE ENCOUNTER
Telephone follow up..  HA the same.  Still very anxious.   Sleep slightly improved although still super anxious about it.  Saw PCP for abdomen complaints.  Starting workup with CT. I noted she does have a family hx of celiac dz which may be worth reviewing between she and the PCP.    -- Increase amitriptyline to 30 mg q hs  -- continue the calm meditation be fore bed  -- Minimize drinking any fluids after suppertime  -- Exercise daily, (walking dog) even if it is only 5 or 10 minutes.  -- My chart me on Mon or Tues and lets see where we are at.    -Magdalena Santana

## 2017-05-11 NOTE — TELEPHONE ENCOUNTER
Reason for Call:  Medication or medication refill:    Do you use a [facility]?  Name of the pharmacy and phone number for the current request:  [facility] - [phone]    Name of the medication requested: lisinopril 5 MG     Other request:  Please keep it at 5 MG . Pt said she told JV it was 10 MG, but it's 5 MG    Can we leave a detailed message on this number? YES    Phone number patient can be reached at: Home number on file [phone] (home)    Best Time: any    Call taken on 5/11/2017 at 3:28 PM by Yazmin Da Silva

## 2017-05-11 NOTE — NURSING NOTE
"Chief Complaint   Patient presents with     GI Problem       Initial /84 (BP Location: Right arm, Patient Position: Chair, Cuff Size: Adult Regular)  Pulse 110  Temp 98  F (36.7  C) (Oral)  Wt 151 lb 12.8 oz (68.9 kg)  LMP 06/28/2001  SpO2 99%  BMI 23.01 kg/m2 Estimated body mass index is 23.01 kg/(m^2) as calculated from the following:    Height as of 5/5/17: 5' 8.11\" (1.73 m).    Weight as of this encounter: 151 lb 12.8 oz (68.9 kg).  Medication Reconciliation: complete   Liane Escobedo CMA  "

## 2017-05-12 LAB
ALBUMIN SERPL-MCNC: 4.4 G/DL (ref 3.4–5)
ALP SERPL-CCNC: 76 U/L (ref 40–150)
ALT SERPL W P-5'-P-CCNC: 20 U/L (ref 0–50)
ANION GAP SERPL CALCULATED.3IONS-SCNC: 4 MMOL/L (ref 3–14)
AST SERPL W P-5'-P-CCNC: 11 U/L (ref 0–45)
BILIRUB SERPL-MCNC: 0.5 MG/DL (ref 0.2–1.3)
BUN SERPL-MCNC: 8 MG/DL (ref 7–30)
CALCIUM SERPL-MCNC: 9.3 MG/DL (ref 8.5–10.1)
CHLORIDE SERPL-SCNC: 99 MMOL/L (ref 94–109)
CO2 SERPL-SCNC: 30 MMOL/L (ref 20–32)
CREAT SERPL-MCNC: 0.68 MG/DL (ref 0.52–1.04)
CREAT UR-MCNC: 39 MG/DL
GFR SERPL CREATININE-BSD FRML MDRD: 88 ML/MIN/1.7M2
GLUCOSE SERPL-MCNC: 100 MG/DL (ref 70–99)
HCV AB SERPL QL IA: NORMAL
MICROALBUMIN UR-MCNC: <5 MG/L
MICROALBUMIN/CREAT UR: NORMAL MG/G CR (ref 0–25)
POTASSIUM SERPL-SCNC: 4.2 MMOL/L (ref 3.4–5.3)
PROT SERPL-MCNC: 7.4 G/DL (ref 6.8–8.8)
SODIUM SERPL-SCNC: 133 MMOL/L (ref 133–144)

## 2017-05-12 NOTE — TELEPHONE ENCOUNTER
RN spoke with patient and she said is taking Lisinopril 5 mg per day.  She said she would be willing to increase dose to 10 mg but not until she is feeling better from her concussion.  She said she gets dizzy from the Lisinopril.  She said she would maybe be okay with increasing at the end of next week.    Note routed to PCP for review and advise.    MALIHA Walker, RN, PHN  [facility]

## 2017-05-12 NOTE — TELEPHONE ENCOUNTER
Please call pt to clarify - is she taking 5mg daily or 10mg daily?      bp was elevated a bit at last visit. Perhaps she should be taking 10mg daily.     BP Readings from Last 3 Encounters:   05/11/17 148/84   05/05/17 128/83   04/29/17 104/72

## 2017-05-14 RX ORDER — LISINOPRIL 5 MG/1
10 TABLET ORAL DAILY
Qty: 60 TABLET | Refills: 1 | Status: SHIPPED | OUTPATIENT
Start: 2017-05-14 | End: 2017-07-14

## 2017-05-15 ENCOUNTER — TRANSFERRED RECORDS (OUTPATIENT)
Dept: HEALTH INFORMATION MANAGEMENT | Facility: CLINIC | Age: 62
End: 2017-05-15

## 2017-05-15 DIAGNOSIS — Z53.9 ERRONEOUS ENCOUNTER--DISREGARD: Primary | ICD-10-CM

## 2017-05-15 NOTE — TELEPHONE ENCOUNTER
BP Readings from Last 3 Encounters:   05/11/17 148/84   05/05/17 128/83   04/29/17 104/72     Please have pt increase her dose of lisinopril to 10mg nightly at the end of next week, then please.   Recheck your blood pressure in our pharmacy in 1 week or sooner if needed.  Have pharmacy send me their note.    If getting too dizzy on the increased dose, then go back to 5mg daily and call to let us know.

## 2017-05-16 ENCOUNTER — TELEPHONE (OUTPATIENT)
Dept: SURGERY | Facility: CLINIC | Age: 62
End: 2017-05-16

## 2017-05-16 ENCOUNTER — HOSPITAL ENCOUNTER (OUTPATIENT)
Dept: CT IMAGING | Facility: CLINIC | Age: 62
Discharge: HOME | End: 2017-05-16
Attending: FAMILY MEDICINE | Admitting: FAMILY MEDICINE
Payer: COMMERCIAL

## 2017-05-16 DIAGNOSIS — F06.4 ANXIETY DISORDER DUE TO BRAIN INJURY: Primary | ICD-10-CM

## 2017-05-16 DIAGNOSIS — R10.32 ABDOMINAL PAIN, LEFT LOWER QUADRANT: ICD-10-CM

## 2017-05-16 PROCEDURE — 25000128 H RX IP 250 OP 636: Performed by: RADIOLOGY

## 2017-05-16 PROCEDURE — 74177 CT ABD & PELVIS W/CONTRAST: CPT

## 2017-05-16 RX ORDER — IOPAMIDOL 755 MG/ML
500 INJECTION, SOLUTION INTRAVASCULAR ONCE
Status: COMPLETED | OUTPATIENT
Start: 2017-05-16 | End: 2017-05-16

## 2017-05-16 RX ORDER — LORAZEPAM 1 MG/1
1-2 TABLET ORAL AT BEDTIME
Qty: 20 TABLET | Refills: 0 | Status: SHIPPED | OUTPATIENT
Start: 2017-05-16 | End: 2017-09-14

## 2017-05-16 RX ADMIN — IOPAMIDOL 75 ML: 755 INJECTION, SOLUTION INTRAVENOUS at 13:51

## 2017-05-16 RX ADMIN — SODIUM CHLORIDE 49 ML: 9 INJECTION, SOLUTION INTRAVENOUS at 13:51

## 2017-05-16 NOTE — TELEPHONE ENCOUNTER
RN spoke with Azalea and advised her of PCP note below.  She said her BP was 115/78 yesterday at Dr. Mitchell's office (eye doctor).  She said she will increase her Lisinopril to 10 mg at the end of this week and will recheck BP at the end of next week in the pharmacy.  RN advised her to contact us if she experiences increased dizziness with the increased dose or if she has questions or concerns.  She verbalized understanding.      Patient also asked if the lab was able to get in the tubes needed for the celiac tests that were ordered.      Plan: Note forwarded to PCP for review.  RN will check with lab and call patient back.    Paula Rosenbaum, MALIHA, RN, PHN  [facility]

## 2017-05-16 NOTE — TELEPHONE ENCOUNTER
----- Message from Yudith White RN sent at 5/16/2017  8:45 AM CDT -----  Regarding: Concussion Patient  Magdalena-    Patient would like you to call her regarding changing medications due to extreme thrist. She says that she is drinking 15-16 glasses of water a day.  She has had more sleepless nights.    She is hoping that you can call her back this morning.    Patients legal name is Jolynn but she goes by Azalea.  Please call home home first if it is morning.  If returning her call in the afternoon please call cell phone.    Thanks  Yudith

## 2017-05-16 NOTE — TELEPHONE ENCOUNTER
Spoke to Azalea after she has called the office requesting help. This is the third time we have talked since her appointment 10 days ago.  She is very stressed and anxious.  Not sleeping well still between 0-5 hrs more towards the 0 than the 5.  We have discussed sleep hygiene, using meditation prior to bed, melatonin without any changes in her sleep.  She also has very dry mouth, drinking lots of water and urinating clear.  I will put in an urgent referral to mental health now, as I think her anxiety is impeding our ability to treat her insomnia.     I would like to get her off the amitriptyline.  I discussed with Christopher Gibson PharmD.  Will stop amitriptyline now.  Start ativan q hs temporarily until she is able to follow up with psychiatry with new recommendations.    Magdalena Santana

## 2017-05-16 NOTE — TELEPHONE ENCOUNTER
Patient advised that lab does have tubes to draw Celiac labs and that she needs to fast for 12 hours prior to the tests.  Patient verbalized understanding.      Paula Rosenbaum, MALIHA, RN, PHN  [facility]

## 2017-05-17 ENCOUNTER — TELEPHONE (OUTPATIENT)
Dept: FAMILY MEDICINE | Facility: CLINIC | Age: 62
End: 2017-05-17

## 2017-05-17 NOTE — TELEPHONE ENCOUNTER
Pt has labs scheduled for 9:45 this Friday. Pt reports they slept 8 hours last night and feel great today.     Pt wondered if they could drink water before their labs. Triage noted yes, they can drink water. Pt also asked about recent CT results. These weren't reviewed yet, but noted that this was unremarkable and no obstructions noted.    Eileen Somers RN

## 2017-05-19 ENCOUNTER — MYC MEDICAL ADVICE (OUTPATIENT)
Dept: SURGERY | Facility: CLINIC | Age: 62
End: 2017-05-19

## 2017-05-19 DIAGNOSIS — S06.0X1A CONCUSSION, WITH LOSS OF CONSCIOUSNESS OF 30 MINUTES OR LESS, INITIAL ENCOUNTER: ICD-10-CM

## 2017-05-19 DIAGNOSIS — R10.32 ABDOMINAL PAIN, LEFT LOWER QUADRANT: ICD-10-CM

## 2017-05-19 DIAGNOSIS — F41.9 ANXIETY DISORDER, UNSPECIFIED TYPE: Primary | ICD-10-CM

## 2017-05-19 DIAGNOSIS — K58.2 IRRITABLE BOWEL SYNDROME WITH BOTH CONSTIPATION AND DIARRHEA: ICD-10-CM

## 2017-05-19 DIAGNOSIS — G47.00 INSOMNIA, UNSPECIFIED TYPE: ICD-10-CM

## 2017-05-19 PROCEDURE — 83516 IMMUNOASSAY NONANTIBODY: CPT | Mod: 59 | Performed by: FAMILY MEDICINE

## 2017-05-19 PROCEDURE — 83516 IMMUNOASSAY NONANTIBODY: CPT | Performed by: FAMILY MEDICINE

## 2017-05-19 PROCEDURE — 82941 ASSAY OF GASTRIN: CPT | Mod: 90 | Performed by: FAMILY MEDICINE

## 2017-05-19 PROCEDURE — 99000 SPECIMEN HANDLING OFFICE-LAB: CPT | Performed by: FAMILY MEDICINE

## 2017-05-19 PROCEDURE — 84586 ASSAY OF VIP: CPT | Mod: 90 | Performed by: FAMILY MEDICINE

## 2017-05-19 PROCEDURE — 36415 COLL VENOUS BLD VENIPUNCTURE: CPT | Performed by: FAMILY MEDICINE

## 2017-05-19 PROCEDURE — 82784 ASSAY IGA/IGD/IGG/IGM EACH: CPT | Performed by: FAMILY MEDICINE

## 2017-05-19 PROCEDURE — 86256 FLUORESCENT ANTIBODY TITER: CPT | Mod: 90 | Performed by: FAMILY MEDICINE

## 2017-05-19 RX ORDER — QUETIAPINE FUMARATE 25 MG/1
12.5-25 TABLET, FILM COATED ORAL AT BEDTIME
Qty: 45 TABLET | Refills: 1 | Status: SHIPPED | OUTPATIENT
Start: 2017-05-19 | End: 2017-05-23

## 2017-05-20 LAB — GASTRIN SERPL-MCNC: 24 PG/ML

## 2017-05-21 LAB
ENDOMYSIUM IGA TITR SER IF: NORMAL {TITER}
IGA SERPL-MCNC: 155 MG/DL (ref 70–380)

## 2017-05-22 ENCOUNTER — TELEPHONE (OUTPATIENT)
Dept: FAMILY MEDICINE | Facility: CLINIC | Age: 62
End: 2017-05-22

## 2017-05-22 LAB
GLIADIN IGA SER-ACNC: 1 U/ML
GLIADIN IGG SER-ACNC: 2 U/ML
TTG IGA SER-ACNC: NORMAL U/ML
TTG IGG SER-ACNC: NORMAL U/ML

## 2017-05-22 NOTE — TELEPHONE ENCOUNTER
Pt is on Lisinopril and increased to 10mg two nights ago.  Both of the last two nights after the pt has taken this, their face gets red, bridge of nose and cheeks, arms get red as well, warm feeling in stomach. This has noted to be going on previously as well, but not as extreme.     This redness is cleared up mostly by the morning. Pt denies SOB or itching, no rash, denies itchy throat, tongue swelling, trouble swallowing.  Pt denies palpitations, dizziness. Pt drinks plenty of water through the day.    [phone], can leave a detailed vm. Pt uses [facility] pharmacy.    Huddle with JV. Asked if this has increased since taking the Seroquel. Called pt and pt noted that they have noticed this symptom increasing since starting the Seroquel. Previous medications were Amitriptyline and lorazepam.    Advised pt to follow up per mychart to the provider who prescribed this medication.  Pt reported they are expecting a call today.  Advised they may need an adjustment of dose, hold or change of medication all together. Routing to prescriber per pts chart notes as well.     The patient indicates understanding of these issues and agrees with the plan.  Eileen Somers RN

## 2017-05-23 ENCOUNTER — MYC MEDICAL ADVICE (OUTPATIENT)
Dept: FAMILY MEDICINE | Facility: CLINIC | Age: 62
End: 2017-05-23

## 2017-05-23 ENCOUNTER — OFFICE VISIT (OUTPATIENT)
Dept: PSYCHIATRY | Facility: CLINIC | Age: 62
End: 2017-05-23
Attending: CLINICAL NURSE SPECIALIST
Payer: COMMERCIAL

## 2017-05-23 VITALS
DIASTOLIC BLOOD PRESSURE: 82 MMHG | HEART RATE: 97 BPM | WEIGHT: 150 LBS | SYSTOLIC BLOOD PRESSURE: 141 MMHG | BODY MASS INDEX: 22.73 KG/M2

## 2017-05-23 DIAGNOSIS — F41.1 GAD (GENERALIZED ANXIETY DISORDER): Primary | ICD-10-CM

## 2017-05-23 DIAGNOSIS — G47.00 INSOMNIA, UNSPECIFIED TYPE: ICD-10-CM

## 2017-05-23 PROCEDURE — 99212 OFFICE O/P EST SF 10 MIN: CPT | Mod: ZF

## 2017-05-23 RX ORDER — MIRTAZAPINE 15 MG/1
15 TABLET, FILM COATED ORAL AT BEDTIME
Qty: 30 TABLET | Refills: 0
Start: 2017-05-23 | End: 2017-05-25 | Stop reason: ALTCHOICE

## 2017-05-23 RX ORDER — ESCITALOPRAM OXALATE 10 MG/1
10 TABLET ORAL DAILY
Qty: 30 TABLET | Refills: 0
Start: 2017-05-23 | End: 2017-05-25

## 2017-05-23 NOTE — TELEPHONE ENCOUNTER
Forwarded to Dr. Ibarra.  Please review patient's Mychart message and advise.  Paula Rosenbaum, RN, BS, PHN

## 2017-05-23 NOTE — MR AVS SNAPSHOT
After Visit Summary   5/23/2017    Jolynn Haji    MRN: [id number]           Patient Information     Date Of Birth          1955        Visit Information        Provider Department      5/23/2017 10:45 AM Kim Brantley APRN CNS [facility]        Today's Diagnoses     TIFFANY (generalized anxiety disorder)    -  1    Insomnia, unspecified type           Follow-ups after your visit        Follow-up notes from your care team     Return in about 3 weeks (around 6/13/2017).      Your next 10 appointments already scheduled     Jun 14, 2017  9:15 AM CDT   Adult General Eval with FORTINO Reaves CNS   Psychiatry Clinic ([facility])    [address]  [address] [facility] [address] [phone]            Jul 05, 2017  6:00 PM CDT   (Arrive by 5:45 PM)   RETURN CONCUSSION with FORTINO Edmond [facility] ([facility])    [address] [facility]  [address] [facility] [address] [phone]              Who to contact     Please call your clinic at [phone] to:    Ask questions about your health    Make or cancel appointments    Discuss your medicines    Learn about your test results    Speak to your doctor   If you have compliments or concerns about an experience at your clinic, or if you wish to file a complaint, please contact [facility] Patient Relations at [phone] or email us at [email or web address]         Additional Information About Your Visit        MyChart Information     [employer] gives you secure access to your electronic health record. If you see a primary care provider, you can also send messages to your care team and make appointments. If you have questions, please call your primary care clinic.  If you do not have a primary care provider, please call [phone] and they will assist you.      [employer] is an electronic gateway  that provides easy, online access to your medical records. With [employer], you can request a clinic appointment, read your test results, renew a prescription or communicate with your care team.     To access your existing account, please contact your [facility] or call [phone] for assistance.        Care EveryWhere ID     This is your Care EveryWhere ID. This could be used by other organizations to access your [place] medical records  [id number]        Your Vitals Were     Pulse Last Period BMI (Body Mass Index)             97 06/28/2001 22.73 kg/m2          Blood Pressure from Last 3 Encounters:   06/02/17 157/84   06/01/17 138/80   05/23/17 141/82    Weight from Last 3 Encounters:   06/02/17 66.9 kg (147 lb 8 oz)   05/23/17 68 kg (150 lb)   05/11/17 68.9 kg (151 lb 12.8 oz)              Today, you had the following     No orders found for display         Today's Medication Changes          These changes are accurate as of: 5/23/17 11:59 PM.  If you have any questions, ask your nurse or doctor.               Start taking these medicines.        Dose/Directions    escitalopram 10 MG tablet   Commonly known as:  LEXAPRO   Used for:  TIFFANY (generalized anxiety disorder)   Started by:  Kim Brantley APRN CNS        Dose:  10 mg   Take 1 tablet (10 mg) by mouth daily   Quantity:  30 tablet   Refills:  0       mirtazapine 15 MG tablet   Commonly known as:  REMERON   Used for:  Insomnia, unspecified type   Started by:  Kim Brantley APRN CNS        Dose:  15 mg   Take 1 tablet (15 mg) by mouth At Bedtime   Quantity:  30 tablet   Refills:  0         Stop taking these medicines if you haven't already. Please contact your care team if you have questions.     melatonin 5 MG Caps   Stopped by:  Kim Brantley APRN CNS           QUEtiapine 25 MG tablet   Commonly known as:  SEROQUEL   Stopped by:  Kim Brantley APRN CNS                Where to get your medicines       Some of these will need a paper prescription and others can be bought over the counter.  Ask your nurse if you have questions.     You don't need a prescription for these medications     escitalopram 10 MG tablet    mirtazapine 15 MG tablet                Primary Care Provider Office Phone # Fax #    Helga Ibarra [phone]       [facility] [address] [facility] [address]        Thank you!     Thank you for choosing PSY[facility]ATRY CLINIC  for your care. Our goal is always to provide you with excellent care. Hearing back from our patients is one way we can continue to improve our services. Please take a few minutes to complete the written survey that you may receive in the mail after your visit with us. Thank you!             Your Updated Medication List - Protect others around you: Learn how to safely use, store and throw away your medicines at www.disposemymeds.org.          This list is accurate as of: 5/23/17 11:59 PM.  Always use your most recent med list.                   Brand Name Dispense Instructions for use    acetaminophen 500 MG tablet    TYLENOL    100 tablet    Take 2 tablets (1,000 mg) by mouth every 6 hours as needed for mild pain       albuterol 108 (90 BASE) MCG/ACT Inhaler    PROAIR HFA/PROVENTIL HFA/VENTOLIN HFA    1 Inhaler    Inhale 2 puffs into the lungs every 6 hours as needed for shortness of breath / dyspnea       escitalopram 10 MG tablet    LEXAPRO    30 tablet    Take 1 tablet (10 mg) by mouth daily       fluticasone 110 MCG/ACT Inhaler    FLOVENT HFA    1 Inhaler    Inhale 2 puffs into the lungs 2 times daily 2 puffs       fluticasone 50 MCG/ACT spray    FLONASE    1 Bottle    Spray 1-2 sprays into both nostrils daily       lisinopril 5 MG tablet    PRINIVIL/ZESTRIL    60 tablet    Take 2 tablets (10 mg) by mouth daily       loratadine 10 MG capsule     30 capsule    Take 10 mg by mouth daily       LORazepam 1 MG tablet    ATIVAN     20 tablet    Take 1-2 tablets (1-2 mg) by mouth At Bedtime       metroNIDAZOLE 0.75 % cream    METROCREAM    45 g    APPLY TOPICALLY TWO TIMES A DAY TO FACE       mirtazapine 15 MG tablet    REMERON    30 tablet    Take 1 tablet (15 mg) by mouth At Bedtime       naproxen sodium 220 MG tablet    ANAPROX    60 tablet    Take 1-2 tablets twice a day as needed for headache       ondansetron 4 MG ODT tab    ZOFRAN ODT    20 tablet    Take 1-2 tablets (4-8 mg) by mouth every 8 hours as needed for nausea

## 2017-05-23 NOTE — Clinical Note
Jose, I say Azalea today and started Lexapro and Mirtazapine. Had her stop the Seroquel.  Both patient and her  have been stressed with all the medication changes so hoping you will be ok with letting me manage for sleep and anxiety until things get better.  If possible we could talk by phone.  My direct office number is [phone]. I have most day but Monday between 12 and 1:15 open time. Kim Brantley CNS, APRN

## 2017-05-23 NOTE — NURSING NOTE
Chief Complaint   Patient presents with     Eval/Assessment     Reviewed allergies, smoking status, and pharmacy preference  Administered abuse screening questions   Obtained weight, blood pressure and heart rate   Darcy Patton LPN

## 2017-05-24 ENCOUNTER — MYC MEDICAL ADVICE (OUTPATIENT)
Dept: FAMILY MEDICINE | Facility: CLINIC | Age: 62
End: 2017-05-24

## 2017-05-24 ENCOUNTER — TELEPHONE (OUTPATIENT)
Dept: PSYCHIATRY | Facility: CLINIC | Age: 62
End: 2017-05-24

## 2017-05-24 DIAGNOSIS — F41.9 ANXIETY DISORDER, UNSPECIFIED TYPE: ICD-10-CM

## 2017-05-24 RX ORDER — QUETIAPINE FUMARATE 25 MG/1
50 TABLET, FILM COATED ORAL AT BEDTIME
Start: 2017-05-24 | End: 2017-05-25

## 2017-05-24 NOTE — TELEPHONE ENCOUNTER
TC to patient. Reviewed recommendations made yesterday and Seroquel. Patient would like to go back to Seroquel 50mg for sleep. Discussed that this is fine with me and that the goal is make her comfortable and get her better.  She will restart Seroquel 50mg at bedtime and also start Lexapro 10mg and try taking it with food. Discussed that it is possible that Seroquel might not work as well after awhile and at that point could consider retrial of Mirtazapine at 30mg or increase in Seroquel.  Reviewed importance of being patient and giving medication time.  Kim Brantley CNS, APRN

## 2017-05-24 NOTE — TELEPHONE ENCOUNTER
No lab results noted.  Please see My Chart message below and advise as appropriate.    Анна Peace RN  Triage [employer]

## 2017-05-25 ENCOUNTER — MYC MEDICAL ADVICE (OUTPATIENT)
Dept: FAMILY MEDICINE | Facility: CLINIC | Age: 62
End: 2017-05-25

## 2017-05-25 ENCOUNTER — CARE COORDINATION (OUTPATIENT)
Dept: PSYCHIATRY | Facility: CLINIC | Age: 62
End: 2017-05-25

## 2017-05-25 NOTE — PROGRESS NOTES
" Patient Call - Side Effects  Received: Today       Paula Mars, Sindy DIANE RN       Phone Number: [phone]                     Pritesh DiazJolynn peter is calling because she has been experiencing some side effects with the medications she took yesterday.  She said that she took her dose of Lexapro yesterday afternoon (later clarified that she took this in the morning) and starting experiencing nausea, shaking hands, blurred vision, and dizziness.  That evening she took her dose of Seroquel before bed and says she saw \"psychadelic animals.\"  When she woke up this morning she was still nauseous and had loose stool.  She says that her hands were clammy and her fingers kept \"spreading left and right.\"  She had a slight \"rocking\" in her body.  Her stomach also felt \"hot\" inside.       She is supposed to take the Lexapro again this morning (with breakfast) but doesn't want to until she speaks with Kim.  She did sleep about 8 and half hours last night.     Jolynn would like a call back as soon as possible at [phone].  It is ok to leave a detailed message.     ThanksPaula         "

## 2017-05-25 NOTE — TELEPHONE ENCOUNTER
She called back and said she does not have questions.  And she is having phone issues.  Arabella Jensen RN- Triage [employer]

## 2017-05-25 NOTE — PROGRESS NOTES
"Provider aware of call and MyChart messages.  Recommends that pt try reducing Lexapro to 5 mg daily.    Call to pt.      Yesterday  AM:  -took Lexapro 10 mg w/o food (first dose)  -felt nauseated and lightheaded  1:00 PM:   -stomach \"felt hot inside\"  -nauseated  -dizzy  -blurred vision  -hands shaky  Before bedtime:   -felt \"extremely relaxed\"  -felt as if she was floating  - then took Seroquel 50 mg   Bedtime:   -room was dark  -saw brightly colored creatures in her room (never occurred before)  -slept 8.5 hours w/o waking    Today:   -did not take Lexapro  -nausea continues  -had loose stool x1  -clammy  -rocking sensation as if she is on a boat  -shaky but feels this is d/t symptoms she has been experiencing    Current meds include:   -Lexapro 10 mg started yesterday  -Seroquel 50 mg qhs since 5/22 prior to this was taking different dose; feels it may be causing stomachache but was not able to say if this worsened with dose increase  -Lisinopril 10 mg qhs (dose increased from 5 mg on 5/20)  -Ativan - not using  -Beano  -GasX    Reports ongoing \"stomach issues\" of bloating and gas which she has been seeing PCP for and all testing has come back negative (celiac disease, parasites).  Is willing to try decreasing Lexapro to 5 mg daily and is encouraged to try taking w/ food to see if this helps with nausea.  Pt aware that provider would be calling her later today and requests call to her home phone [phone].  If pt does not answer with first call, as it only rings 4 times, asks that provider call back shortly after.    "

## 2017-05-26 ENCOUNTER — MYC MEDICAL ADVICE (OUTPATIENT)
Dept: PSYCHIATRY | Facility: CLINIC | Age: 62
End: 2017-05-26

## 2017-05-26 ENCOUNTER — TELEPHONE (OUTPATIENT)
Dept: SURGERY | Facility: CLINIC | Age: 62
End: 2017-05-26

## 2017-05-26 LAB — VIP SERPL-MCNC: NORMAL NG/L

## 2017-05-26 NOTE — TELEPHONE ENCOUNTER
Call to patient. Reviewed sleep last night and effect of Lexapro. Reports with 5 mg dose of Lexapro no side effects.  Only slept one and 1/2 hours last night. Additional anxiety since  was seen for medical appointment and has to have neuro work up.  Reviewed importance of trying not to let worries and anxiety feed symptoms and that sleep and anxiety will vary as she is on the medication and is expected.  Recommended that she continue Seroquel and Lexapro at current dose for longer trial and to avoid increase anxiety if medication changes are made.  She agrees with this plan and to have me call her next Tuesday.  Kim Brantley CNS, APRN

## 2017-05-30 ENCOUNTER — TELEPHONE (OUTPATIENT)
Dept: PSYCHIATRY | Facility: CLINIC | Age: 62
End: 2017-05-30

## 2017-05-30 DIAGNOSIS — F41.1 GAD (GENERALIZED ANXIETY DISORDER): ICD-10-CM

## 2017-05-30 DIAGNOSIS — G47.00 INSOMNIA, UNSPECIFIED TYPE: ICD-10-CM

## 2017-05-31 RX ORDER — ESCITALOPRAM OXALATE 10 MG/1
10 TABLET ORAL DAILY
Qty: 30 TABLET | Refills: 0
Start: 2017-05-23 | End: 2017-06-14

## 2017-06-01 ENCOUNTER — ALLIED HEALTH/NURSE VISIT (OUTPATIENT)
Dept: FAMILY MEDICINE | Facility: CLINIC | Age: 62
End: 2017-06-01
Payer: COMMERCIAL

## 2017-06-01 VITALS — SYSTOLIC BLOOD PRESSURE: 138 MMHG | DIASTOLIC BLOOD PRESSURE: 80 MMHG

## 2017-06-01 DIAGNOSIS — I10 ESSENTIAL HYPERTENSION WITH GOAL BLOOD PRESSURE LESS THAN 140/90: Primary | ICD-10-CM

## 2017-06-01 PROCEDURE — 99207 ZZC NO CHARGE NURSE ONLY: CPT | Performed by: FAMILY MEDICINE

## 2017-06-01 NOTE — NURSING NOTE
Jolynn Haji is enrolled/participating in the retail pharmacy Blood Pressure Goals Achievement Program (BPGAP).  Jolynn Haji was evaluated at [facility] on June 1, 2017 at which time her blood pressure was:    BP Readings from Last 3 Encounters:   06/01/17 138/80   05/11/17 148/84   05/05/17 128/83     Reviewed lifestyle modifications for blood pressure control and reduction: including making healthy food choices, managing weight, getting regular exercise, smoking cessation, reducing alcohol consumption, monitoring blood pressure regularly.     Jolynn Haji is not experiencing symptoms.    Follow-Up: BP is at goal of < 140/90mmHg (patient 18+ years of age with or without diabetes).  Recommended follow-up at pharmacy in 1 month to make certain still at goal    Recommendation to Provider: Continue current dosing on lisinopril.  Pt is very anxious at this time.      Jolynn Haji was evaluated for enrollment into the PGEN study today.    Patient eligible for enrollment:  No  Patient interested in enrollment:  No    Completed by: Thank you,  Pallavi Bustos [facility], Mgr [facility] [place] [phone]

## 2017-06-01 NOTE — MR AVS SNAPSHOT
After Visit Summary   6/1/2017    Jolynn Haji    MRN: [id number]           Patient Information     Date Of Birth          1955        Visit Information        Provider Department      6/1/2017 1:11 PM Helga Ibarra MD [facility]        Today's Diagnoses     Essential hypertension with goal blood pressure less than 140/90    -  1       Follow-ups after your visit        Your next 10 appointments already scheduled     Jun 02, 2017  2:15 PM CDT   (Arrive by 2:00 PM)   NEW CONCUSSION with FORTINO Edmond CNP [facility] ([facility])    [address] [address]   [phone]            Jun 09, 2017  1:30 PM CDT   (Arrive by 1:15 PM)   RETURN CONCUSSION with FORTINO Edmond CNP [facility] ([facility])    [address] [address]   [phone]            Jun 14, 2017  9:15 AM CDT   Adult General Eval with FORTINO Reaves CNS   Psychiatry Clinic ([facility])    [address]  [address] [facility] [address]   [phone]            Jun 30, 2017 12:45 PM CDT   (Arrive by 12:30 PM)   RETURN CONCUSSION with Thomas Mo NP   [facility] ([facility])    [address] [place] [address]   [phone]              Who to contact     If you have questions or need follow up information about today's clinic visit or your schedule please contact [facility] directly at [phone].  Normal or non-critical lab and imaging results will be communicated to you by MyChart, letter or phone within 4 business days after the clinic has received the results. If you do not hear from us within 7 days, please contact the clinic through MyChart or phone. If you have a critical or abnormal lab result, we  will notify you by phone as soon as possible.  Submit refill requests through [employer] or call your pharmacy and they will forward the refill request to us. Please allow 3 business days for your refill to be completed.          Additional Information About Your Visit        [employer] Information     [employer] gives you secure access to your electronic health record. If you see a primary care provider, you can also send messages to your care team and make appointments. If you have questions, please call your primary care clinic.  If you do not have a primary care provider, please call [phone] and they will assist you.        Care EveryWhere ID     This is your Care EveryWhere ID. This could be used by other organizations to access your [place] medical records  [id number]        Your Vitals Were     Last Period                   06/28/2001            Blood Pressure from Last 3 Encounters:   06/01/17 138/80   05/11/17 148/84   05/05/17 128/83    Weight from Last 3 Encounters:   05/11/17 151 lb 12.8 oz (68.9 kg)   05/05/17 152 lb (68.9 kg)   04/29/17 150 lb (68 kg)              Today, you had the following     No orders found for display       Primary Care Provider Office Phone # Fax #    Helga Ibarra [phone]       [address] [address]        Thank you!     Thank you for choosing [facility]  for your care. Our goal is always to provide you with excellent care. Hearing back from our patients is one way we can continue to improve our services. Please take a few minutes to complete the written survey that you may receive in the mail after your visit with us. Thank you!             Your Updated Medication List - Protect others around you: Learn how to safely use, store and throw away your medicines at www.disposemymeds.org.          This list is accurate as of: 6/1/17  1:27 PM.  Always use your most recent med list.                    Brand Name Dispense Instructions for use    acetaminophen 500 MG tablet    TYLENOL    100 tablet    Take 2 tablets (1,000 mg) by mouth every 6 hours as needed for mild pain       albuterol 108 (90 BASE) MCG/ACT Inhaler    PROAIR HFA/PROVENTIL HFA/VENTOLIN HFA    1 Inhaler    Inhale 2 puffs into the lungs every 6 hours as needed for shortness of breath / dyspnea       * escitalopram 10 MG tablet    LEXAPRO    30 tablet    Take 1 tablet (10 mg) by mouth daily       * escitalopram 10 MG tablet    LEXAPRO     Take 0.5 tablets (5 mg) by mouth daily       fluticasone 110 MCG/ACT Inhaler    FLOVENT HFA    1 Inhaler    Inhale 2 puffs into the lungs 2 times daily 2 puffs       fluticasone 50 MCG/ACT spray    FLONASE    1 Bottle    Spray 1-2 sprays into both nostrils daily       lisinopril 5 MG tablet    PRINIVIL/ZESTRIL    60 tablet    Take 2 tablets (10 mg) by mouth daily       loratadine 10 MG capsule     30 capsule    Take 10 mg by mouth daily       LORazepam 1 MG tablet    ATIVAN    20 tablet    Take 1-2 tablets (1-2 mg) by mouth At Bedtime       metroNIDAZOLE 0.75 % cream    METROCREAM    45 g    APPLY TOPICALLY TWO TIMES A DAY TO FACE       naproxen sodium 220 MG tablet    ANAPROX    60 tablet    Take 1-2 tablets twice a day as needed for headache       ondansetron 4 MG ODT tab    ZOFRAN ODT    20 tablet    Take 1-2 tablets (4-8 mg) by mouth every 8 hours as needed for nausea       QUEtiapine 25 MG tablet    SEROQUEL    60 tablet    Take 2 tablets (50 mg) by mouth At Bedtime       * Notice:  This list has 2 medication(s) that are the same as other medications prescribed for you. Read the directions carefully, and ask your doctor or other care provider to review them with you.

## 2017-06-02 ENCOUNTER — OFFICE VISIT (OUTPATIENT)
Dept: SURGERY | Facility: CLINIC | Age: 62
End: 2017-06-02

## 2017-06-02 VITALS
DIASTOLIC BLOOD PRESSURE: 84 MMHG | OXYGEN SATURATION: 96 % | HEIGHT: 68 IN | BODY MASS INDEX: 22.35 KG/M2 | WEIGHT: 147.5 LBS | HEART RATE: 84 BPM | SYSTOLIC BLOOD PRESSURE: 157 MMHG | TEMPERATURE: 98.7 F

## 2017-06-02 DIAGNOSIS — S06.0X0D CONCUSSION, WITHOUT LOSS OF CONSCIOUSNESS, SUBSEQUENT ENCOUNTER: Primary | ICD-10-CM

## 2017-06-02 RX ORDER — MIRTAZAPINE 15 MG/1
15 TABLET, FILM COATED ORAL AT BEDTIME
Start: 2017-06-02 | End: 2017-06-14 | Stop reason: DRUGHIGH

## 2017-06-02 RX ORDER — LISINOPRIL 10 MG/1
TABLET ORAL
COMMUNITY
Start: 2017-05-20 | End: 2017-06-02

## 2017-06-02 ASSESSMENT — PAIN SCALES - GENERAL: PAINLEVEL: MODERATE PAIN (4)

## 2017-06-02 NOTE — LETTER
"6/2/2017       RE: Jolynn Haji  [address] [place] [address]     Dear Colleague,    Thank you for referring your patient, Jolynn Haji, to the [facility] CONCUSSION at [facility]. Please see a copy of my visit note below.    [facility] Follow up June 2, 2017      Time/date of injury:  4/9/17  Mechanism:  Fall from standing    Assessment/Plan:  Pt presents today for follow up for concussion happened in April.  She may or may not be still having symptoms related to her TBI but her severe anxiety and panic disorder are dwarfing any more subtle deliniation between anxiety and concussion in symptom etiology.    She reports that she is so torn with worry and anxiety that she is almost non-functional, doesn't eat well, anxious about absolutely everything, doesn't leave the house much, doesn't walk her dog.  She wakes up at night with panic attacks, still sleeping 2.3-3 hours a night despite our medication adjustments we have done in conjunction with  Kim Brantley NP from the psychiatry clinic.      She was very teary throughout the interview.  Wringing her hands, turing to her  saying \"do you think I can do that?  I can't do that!\" pretty much regarding every thing we talked about.  The most problematic thing seems to be decision making of any sort.  In attempts to minimize this we came up with a daily schedule (below).  We also spoke at length about trying the gluten free diet.  This may be a critical component of getting through this spell which has now been almost 2 months and appears worse now than when I started seeing her.  100% of this visit was in education.  It was difficult getting her to leave as she was too scared to leave my presence.  I ended up walking her out to the waiting room and her  took her by hand to get her to leave.  I very much appreciate Kim's assistance with this case.  I would take myself out of the picture, and defer " "to the psychiatry specialists if I didn't think that would send her into a horrible tail spin.  I continue to talk to Kim through [employer] to assure we are giving her the same information as ANY contradictions are very hard for her.  I also spent time explaining how the directions we give WILL change, as her condition changes so she should try to do the things we most recently suggest not what was necessarily suggested 6 weeks ago.     Since pulling her out of activity has not helped, I now am encouraging her to get into a more active routine so that she is more mobile, and trying to do things even when she thinks she \"can't\".  \  Follow up with Kim is next week, and I will schedule her next appointment with me in 4 weeks. With great hope that any of this will help her, this is the directions I gave.    Try the gluten free diet-- PLEASE    CALM lorena-- HOME PAGE  (If you dont see the home page with the word Calm at the top, Push the back or close buttons until you do.  )    The \"breath\" button on the bottom left will take you to the breathing exercises    The \"meditation\" button on the bottom middle will take you to the meditation exercises.      Ignore ALL of the mediation exercises EXCEPT    7 Days of calm  21 Days of calm  7 Days of sleep    DAILY ROUTINE  1. When you wake up eat breakfast and then go walk your dog.   2. Do light housework during the day.  3. No TV the last hour of your day (9-10PM?), other times is ok  4. In the middle of the day when you are panicking do the Meditation exercises on Calm lorena  5. Before you go to bed do  \"7 days of sleep\" meditation on your Calm lorena  6. After the \"7 days of sleep\", then start he  Unguided \"Timed Meditation\" set the time for 8 hours and plug your phone in  7. When you wake up at night do the \"breathing\" exercise on your Calm lorena  Try to do it EVERY time you wake up worried or anxious    MEDICATIONS:  Start taking the Mirtazapine (15 mg) every night at the same " "time as the seroquel.   Take your full tablet of lexapro (10 mg) every morning when you wake up.     The doses on your medications will change as we see how effective they are        Magdalena Santana     Current medications:  Reconciled in chart today by clinic staff and reviewed by me.    REVIEW OF SYSTEMS:  Refer to DocFlowsheets:  Concussion symptoms  CONSTITUTIONAL:  see Concussion symptoms  EYES: see Concussion symptoms  ENT: see Concussion symptoms  PSYCHIATRIC: see PHQ-9 and TIFFANY, Sleep:    RESPIRATORY: no shortness of breath, no cough  CARDIOVASCULAR:, no chest pain or pressure or palpitations  GASTROINTESTINAL: no nausea or vomiting, or abdominal pain   MUSCULOSKELETAL: no weakness, no pain  NEUROLOGIC: no numbness or tingling of hands or feet, no syncope, no tremors or weakness, no balance issues or gait disturbances    OBJECTIVE:   /84 (BP Location: Left arm, Patient Position: Chair, Cuff Size: Adult Regular)  Pulse 84  Temp 98.7  F (37.1  C)  Ht 5' 8\"  Wt 147 lb 8 oz  LMP 06/28/2001  SpO2 96%  BMI 22.43 kg/m2    Wt Readings from Last 4 Encounters:   06/02/17 147 lb 8 oz   05/23/17 150 lb   05/11/17 151 lb 12.8 oz   05/05/17 152 lb       EXAM:  GENERAL: alert, oriented to person, place, time  HEAD: atraumatic, normocephalic, trachea midline  EYES: PERRL, EOMI, corneas and conjunctivae red  CHEST/ PULMONARY: normal respiratory rate and rhythm   CARDIOVASCULAR: Warm extremities with good pulses  GI: soft, non-tender, no guarding  MUSCULOSKELETAL / EXTREMITIES: normal extremities, no discernable edema on legs.   SKIN:  diaphoretic  PSYCHIATRIC: affect/mood normal, cooperative, normal judgement/insight and memory intact.  Anxious appearing.  Wringing hands, unable to follow conversation most of the time. Gets hung up on one thing and cant get past it.     Sincerely,    Magdalena Santana, FORTINO CNP  "

## 2017-06-02 NOTE — Clinical Note
Cancel appointment with Thomas.   Only schedule her with me from here forward Please put her in on July 5th at 6PM

## 2017-06-02 NOTE — PROGRESS NOTES
"[facility] Concussion Clinic Follow up June 2, 2017      Time/date of injury:  4/9/17  Mechanism:  Fall from standing    Assessment/Plan:  Pt presents today for follow up for concussion happened in April.  She may or may not be still having symptoms related to her TBI but her severe anxiety and panic disorder are dwarfing any more subtle deliniation between anxiety and concussion in symptom etiology.    She reports that she is so torn with worry and anxiety that she is almost non-functional, doesn't eat well, anxious about absolutely everything, doesn't leave the house much, doesn't walk her dog.  She wakes up at night with panic attacks, still sleeping 2.3-3 hours a night despite our medication adjustments we have done in conjunction with  Kim Brantley NP from the psychiatry clinic.      She was very teary throughout the interview.  Wringing her hands, turing to her  saying \"do you think I can do that?  I can't do that!\" pretty much regarding every thing we talked about.  The most problematic thing seems to be decision making of any sort.  In attempts to minimize this we came up with a daily schedule (below).  We also spoke at length about trying the gluten free diet.  This may be a critical component of getting through this spell which has now been almost 2 months and appears worse now than when I started seeing her.  100% of this visit was in education.  It was difficult getting her to leave as she was too scared to leave my presence.  I ended up walking her out to the waiting room and her  took her by hand to get her to leave.  I very much appreciate Kim's assistance with this case.  I would take myself out of the picture, and defer to the psychiatry specialists if I didn't think that would send her into a horrible tail spin.  I continue to talk to Kim through [employer] to assure we are giving her the same information as ANY contradictions are very hard for her.  I also spent time explaining how the " "directions we give WILL change, as her condition changes so she should try to do the things we most recently suggest not what was necessarily suggested 6 weeks ago.     Since pulling her out of activity has not helped, I now am encouraging her to get into a more active routine so that she is more mobile, and trying to do things even when she thinks she \"can't\".  \  Follow up with Kim is next week, and I will schedule her next appointment with me in 4 weeks. With great hope that any of this will help her, this is the directions I gave.    Try the gluten free diet-- PLEASE    CALM lorena-- HOME PAGE  (If you dont see the home page with the word Calm at the top, Push the back or close buttons until you do.  )    The \"breath\" button on the bottom left will take you to the breathing exercises    The \"meditation\" button on the bottom middle will take you to the meditation exercises.      Ignore ALL of the mediation exercises EXCEPT    7 Days of calm  21 Days of calm  7 Days of sleep    DAILY ROUTINE  1. When you wake up eat breakfast and then go walk your dog.   2. Do light housework during the day.  3. No TV the last hour of your day (9-10PM?), other times is ok  4. In the middle of the day when you are panicking do the Meditation exercises on Calm lorena  5. Before you go to bed do  \"7 days of sleep\" meditation on your Calm lorena  6. After the \"7 days of sleep\", then start he  Unguided \"Timed Meditation\" set the time for 8 hours and plug your phone in  7. When you wake up at night do the \"breathing\" exercise on your Calm lorena  Try to do it EVERY time you wake up worried or anxious    MEDICATIONS:  Start taking the Mirtazapine (15 mg) every night at the same time as the seroquel.   Take your full tablet of lexapro (10 mg) every morning when you wake up.     The doses on your medications will change as we see how effective they are        Magdalena Santana       Current medications:  Reconciled in chart today by clinic staff " "and reviewed by me.    REVIEW OF SYSTEMS:  Refer to DocFlowsheets:  Concussion symptoms  CONSTITUTIONAL:  see Concussion symptoms  EYES: see Concussion symptoms  ENT: see Concussion symptoms  PSYCHIATRIC: see PHQ-9 and TIFFANY, Sleep:    RESPIRATORY: no shortness of breath, no cough  CARDIOVASCULAR:, no chest pain or pressure or palpitations  GASTROINTESTINAL: no nausea or vomiting, or abdominal pain   MUSCULOSKELETAL: no weakness, no pain  NEUROLOGIC: no numbness or tingling of hands or feet, no syncope, no tremors or weakness, no balance issues or gait disturbances    OBJECTIVE:   /84 (BP Location: Left arm, Patient Position: Chair, Cuff Size: Adult Regular)  Pulse 84  Temp 98.7  F (37.1  C)  Ht 5' 8\"  Wt 147 lb 8 oz  LMP 06/28/2001  SpO2 96%  BMI 22.43 kg/m2    Wt Readings from Last 4 Encounters:   06/02/17 147 lb 8 oz   05/23/17 150 lb   05/11/17 151 lb 12.8 oz   05/05/17 152 lb       EXAM:  GENERAL: alert, oriented to person, place, time  HEAD: atraumatic, normocephalic, trachea midline  EYES: PERRL, EOMI, corneas and conjunctivae red  CHEST/ PULMONARY: normal respiratory rate and rhythm   CARDIOVASCULAR: Warm extremities with good pulses  GI: soft, non-tender, no guarding  MUSCULOSKELETAL / EXTREMITIES: normal extremities, no discernable edema on legs.   SKIN:  diaphoretic  PSYCHIATRIC: affect/mood normal, cooperative, normal judgement/insight and memory intact.  Anxious appearing.  Wringing hands, unable to follow conversation most of the time. Gets hung up on one thing and cant get past it.       "

## 2017-06-02 NOTE — NURSING NOTE
"No chief complaint on file.      Vitals:    06/02/17 1346   BP: 157/84   BP Location: Left arm   Patient Position: Chair   Cuff Size: Adult Regular   Pulse: 84   Temp: 98.7  F (37.1  C)   SpO2: 96%   Weight: 147 lb 8 oz   Height: 5' 8\"       Body mass index is 22.43 kg/(m^2).      Gabriella Ling                            "

## 2017-06-02 NOTE — MR AVS SNAPSHOT
"              After Visit Summary   6/2/2017    Jolynn Haji    MRN: [id number]           Patient Information     Date Of Birth          1955        Visit Information        Provider Department      6/2/2017 2:15 PM Magdalena Santana APRN CNP [facility] Concussion        Care Instructions    Try the gluten free diet-- PLEASE    CALM lorena-- HOME PAGE  (If you dont see the home page with the word Calm at the top, Push the back or close buttons until you do.  )    The \"breath\" button on the bottom left will take you to the breathing exercises    The \"meditation\" button on the bottom middle will take you to the meditation exercises.      Ignore ALL of the mediation exercises EXCEPT    7 Days of calm  21 Days of calm  7 Days of sleep    DAILY ROUTINE  1. When you wake up eat breakfast and then go walk your dog.   2. Do light housework during the day.  3. No TV the last hour of your day (9-10PM?), other times is ok  4. In the middle of the day when you are panicking do the Meditation exercises on Calm lorena  5. Before you go to bed do  \"7 days of sleep\" meditation on your Calm lorena  6. After the \"7 days of sleep\", then start he  Unguided \"Timed Meditation\" set the time for 8 hours and plug your phone in  7. When you wake up at night do the \"breathing\" exercise on your Calm lorena  Try to do it EVERY time you wake up worried or anxious    MEDICATIONS:  Start taking the Mirtazapine (15 mg) every night at the same time as the seroquel.   Take your full tablet of lexapro (10 mg) every morning when you wake up.     The doses on your medications will change as we see how effective they are                Follow-ups after your visit        Your next 10 appointments already scheduled     Jun 09, 2017  1:30 PM CDT   (Arrive by 1:15 PM)   RETURN CONCUSSION with FORTINO Edmond CNP [facility] ([facility])    [address] [place] [address] [phone]         "    Jun 14, 2017  9:15 AM CDT   Adult General Eval with FORTINO Reaves CNS   Psychiatry Clinic ([facility])    [address]  [address] [facility] [address] [phone]            Jun 30, 2017 12:45 PM CDT   (Arrive by 12:30 PM)   RETURN CONCUSSION with Thomas Mo NP   [facility] ([facility])    [address]  [address]  [facility] [address] [phone]              Who to contact     Please call your clinic at [phone] to:    Ask questions about your health    Make or cancel appointments    Discuss your medicines    Learn about your test results    Speak to your doctor   If you have compliments or concerns about an experience at your clinic, or if you wish to file a complaint, please contact [facility] Patient Relations at [phone] or email us at [email or web address]         Additional Information About Your Visit        [employer] Information     [employer] gives you secure access to your electronic health record. If you see a primary care provider, you can also send messages to your care team and make appointments. If you have questions, please call your primary care clinic.  If you do not have a primary care provider, please call [phone] and they will assist you.      [employer] is an electronic gateway that provides easy, online access to your medical records. With [employer], you can request a clinic appointment, read your test results, renew a prescription or communicate with your care team.     To access your existing account, please contact your [facility] or call [phone] for assistance.        Care EveryWhere ID     This is your Care EveryWhere ID. This could be used by other organizations to access your [facility] medical records  [id number]        Your Vitals Were     Pulse Temperature Height Last Period Pulse  "Oximetry BMI (Body Mass Index)    84 98.7  F (37.1  C) 5' 8\" 06/28/2001 96% 22.43 kg/m2       Blood Pressure from Last 3 Encounters:   06/02/17 157/84   06/01/17 138/80   05/23/17 141/82    Weight from Last 3 Encounters:   06/02/17 147 lb 8 oz   05/23/17 150 lb   05/11/17 151 lb 12.8 oz              Today, you had the following     No orders found for display         Today's Medication Changes          These changes are accurate as of: 6/2/17  2:51 PM.  If you have any questions, ask your nurse or doctor.               These medicines have changed or have updated prescriptions.        Dose/Directions    lisinopril 5 MG tablet   Commonly known as:  PRINIVIL/ZESTRIL   This may have changed:  Another medication with the same name was removed. Continue taking this medication, and follow the directions you see here.   Used for:  Essential hypertension with goal blood pressure less than 140/90   Changed by:  Helga Ibarra MD        Dose:  10 mg   Take 2 tablets (10 mg) by mouth daily   Quantity:  60 tablet   Refills:  1                Primary Care Provider Office Phone # Fax #    Helga Ibarra [phone]       [place] [id number]        Thank you!     Thank you for choosing [facility]  for your care. Our goal is always to provide you with excellent care. Hearing back from our patients is one way we can continue to improve our services. Please take a few minutes to complete the written survey that you may receive in the mail after your visit with us. Thank you!             Your Updated Medication List - Protect others around you: Learn how to safely use, store and throw away your medicines at www.disposemymeds.org.          This list is accurate as of: 6/2/17  2:51 PM.  Always use your most recent med list.                   Brand Name Dispense Instructions for use    acetaminophen 500 MG tablet    TYLENOL    100 tablet    Take 2 " tablets (1,000 mg) by mouth every 6 hours as needed for mild pain       albuterol 108 (90 BASE) MCG/ACT Inhaler    PROAIR HFA/PROVENTIL HFA/VENTOLIN HFA    1 Inhaler    Inhale 2 puffs into the lungs every 6 hours as needed for shortness of breath / dyspnea       escitalopram 10 MG tablet    LEXAPRO    30 tablet    Take 1 tablet (10 mg) by mouth daily       fluticasone 110 MCG/ACT Inhaler    FLOVENT HFA    1 Inhaler    Inhale 2 puffs into the lungs 2 times daily 2 puffs       fluticasone 50 MCG/ACT spray    FLONASE    1 Bottle    Spray 1-2 sprays into both nostrils daily       lisinopril 5 MG tablet    PRINIVIL/ZESTRIL    60 tablet    Take 2 tablets (10 mg) by mouth daily       loratadine 10 MG capsule     30 capsule    Take 10 mg by mouth daily       LORazepam 1 MG tablet    ATIVAN    20 tablet    Take 1-2 tablets (1-2 mg) by mouth At Bedtime       metroNIDAZOLE 0.75 % cream    METROCREAM    45 g    APPLY TOPICALLY TWO TIMES A DAY TO FACE       mirtazapine 15 MG tablet    REMERON     Take 1 tablet (15 mg) by mouth At Bedtime Will try adding to Seroquel at bedtime but may stop if not tolerated after the first night.       naproxen sodium 220 MG tablet    ANAPROX    60 tablet    Take 1-2 tablets twice a day as needed for headache       ondansetron 4 MG ODT tab    ZOFRAN ODT    20 tablet    Take 1-2 tablets (4-8 mg) by mouth every 8 hours as needed for nausea       QUEtiapine 25 MG tablet    SEROQUEL    60 tablet    Take 2 tablets (50 mg) by mouth At Bedtime

## 2017-06-02 NOTE — PATIENT INSTRUCTIONS
"Try the gluten free diet-- PLEASE    CALM lorena-- HOME PAGE  (If you dont see the home page with the word Calm at the top, Push the back or close buttons until you do.  )    The \"breath\" button on the bottom left will take you to the breathing exercises    The \"meditation\" button on the bottom middle will take you to the meditation exercises.      Ignore ALL of the mediation exercises EXCEPT    7 Days of calm  21 Days of calm  7 Days of sleep    DAILY ROUTINE  1. When you wake up eat breakfast and then go walk your dog.   2. Do light housework during the day.  3. No TV the last hour of your day (9-10PM?), other times is ok  4. In the middle of the day when you are panicking do the Meditation exercises on Calm lorena  5. Before you go to bed do  \"7 days of sleep\" meditation on your Calm lorena  6. After the \"7 days of sleep\", then start he  Unguided \"Timed Meditation\" set the time for 8 hours and plug your phone in  7. When you wake up at night do the \"breathing\" exercise on your Calm lorena  Try to do it EVERY time you wake up worried or anxious    MEDICATIONS:  Start taking the Mirtazapine (15 mg) every night at the same time as the seroquel.   Take your full tablet of lexapro (10 mg) every morning when you wake up.     The doses on your medications will change as we see how effective they are        "

## 2017-06-02 NOTE — TELEPHONE ENCOUNTER
Phone calls to patient on 5/30, 5/31/17 and 6/2/17 with summary of calls as follows: Sleep varied from 5/26 to 5/30 with range of a few hours to 6 hours.  No complaints of dizziness when getting up or faintness. New stress of health insurance letter reporting she had maxed out some coverage and also with her  getting multiple referrals at a medical appointment last week.  On 5/30/17 I recommended she increase Lexapro back to 10mg and she has not reported as severe side effects as the first time she took 10mg on 5/23/17.  She does report decreased appetite and further weight loss. We discussed that this is a normal side effect of Lexapro or could be anxiety but that I think it will improve over time.  Labs for GI work up were normal. She continues to have the same symptoms without worsening with medications except constipation attributed to Seroquel which is at 50mg.  On 6/2/17 she reports continued anxiety throughout the day and in the am feeling thick headed and can't get much done in the am.  Reports she goes to bed around 10:30pm and gets up at 7 am although wakes earlier and stays in bed.  During the day she is trying to do normal routine things - laundry, vacuum, meal prep, care of her dog but has some anxiety she is doing more than recommended for recovery from concussion. She will talk with that provider Magdalena Santana at appointment today and I recommended that she follow her advice on activity limitations.  In addition patient has noticed leg swelling and will talk with Magdalena about that.  If it appears due to Seroquel it is ok for Magdalena to change the dose or discontinue it.  I had discussed today retrial of Mirtazapine since she is still not sleeping more than a few hours generally, feels anxious through the night and has decreased appetite.  Azalea is concerned about constipation with Seroquel but also anxious about medication changes so I advised that unless Magdalena thinks that the water retention  is a problem and a result of Seroquel that Azalea, for now just add Mirtazapine 15mg and I will call her next Tuesday to check in on the effect and consider further increase in Mirtazapine and taper off Seroquel.  If she finds the addition of Mirtazapine too distressing or has intolerable side effects discussed that she should not take it again until we talk on 6/6/17.  Also discussed that if her insurance allows it would be best to see an therapist for optimal improvement in anxiety. I will look into therapist in our health system close to her home.  Kim Brantley CNS, APRN

## 2017-06-03 ASSESSMENT — PATIENT HEALTH QUESTIONNAIRE - PHQ9: SUM OF ALL RESPONSES TO PHQ QUESTIONS 1-9: 23

## 2017-06-06 ENCOUNTER — TELEPHONE (OUTPATIENT)
Dept: PSYCHIATRY | Facility: CLINIC | Age: 62
End: 2017-06-06

## 2017-06-06 NOTE — TELEPHONE ENCOUNTER
Call to patient to check on status.  Has had a couple panic attacks during the night. Taken Mirtazapine about 4 nights with Seroquel. Sleeps 10:30 pm to 12:30 am then wakes but falls back to sleep until about 3:30 or 4 am. When she wakes up at that time.   Sunday able to spend 3 hours at her daughter's and say had a friend over for awhile one day. It is a struggle to do all these and takes great effort.  Worried that her memory is worse, such as forgetting to turn on the dryer, if she is took her medications, difficutly forming thoughts.   Still worried about insurance and her .   Continue current medications at this time and I will check back on Thursday.  Kim Brantley CNS, APRN

## 2017-06-07 ASSESSMENT — PATIENT HEALTH QUESTIONNAIRE - PHQ9: SUM OF ALL RESPONSES TO PHQ QUESTIONS 1-9: 16

## 2017-06-08 ENCOUNTER — ALLIED HEALTH/NURSE VISIT (OUTPATIENT)
Dept: FAMILY MEDICINE | Facility: CLINIC | Age: 62
End: 2017-06-08
Payer: COMMERCIAL

## 2017-06-08 ENCOUNTER — TELEPHONE (OUTPATIENT)
Dept: PSYCHIATRY | Facility: CLINIC | Age: 62
End: 2017-06-08

## 2017-06-08 VITALS — DIASTOLIC BLOOD PRESSURE: 78 MMHG | SYSTOLIC BLOOD PRESSURE: 132 MMHG

## 2017-06-08 DIAGNOSIS — I10 ESSENTIAL HYPERTENSION WITH GOAL BLOOD PRESSURE LESS THAN 140/90: Primary | ICD-10-CM

## 2017-06-08 PROCEDURE — 99207 ZZC NO CHARGE NURSE ONLY: CPT | Performed by: FAMILY MEDICINE

## 2017-06-08 NOTE — PROGRESS NOTES
Jolynn Haji is enrolled/participating in the retail pharmacy Blood Pressure Goals Achievement Program (BPGAP).  Jolynn Haji was evaluated at [facility] on June 8, 2017 at which time her blood pressure was:    BP Readings from Last 3 Encounters:   06/08/17 132/78   06/02/17 157/84   06/01/17 138/80     Reviewed lifestyle modifications for blood pressure control and reduction: including making healthy food choices, managing weight, getting regular exercise, smoking cessation, reducing alcohol consumption, monitoring blood pressure regularly.     Jolynn Haji is not experiencing symptoms.    Follow-Up: BP is at goal of < 140/90mmHg (patient 18+ years of age with or without diabetes).  Recommended follow-up at pharmacy in 6 months.     Recommendation to Provider: None    Jolynn Haji was evaluated for enrollment into the PGEN study today.    Patient eligible for enrollment:  No  Patient interested in enrollment:  No    Completed by:   Jefferson Blackburn [facility] [place]  [phone]

## 2017-06-08 NOTE — TELEPHONE ENCOUNTER
Reports very anxious today, worse than on Tuesday.  Maybe due to health problems with her  who has swelling in his legs but he wants to put off to take care of her.   Friends have been over and she has to work hard to hold in her anxiety.  If she was feeling normal she would be happy and excited to see them.   Able to fall asleep right away, sleeps till  Midnight, falls back to sleep for at least a few hours.  Appetite is the same. Drinking more than  8 to 10 glasses of watering.   Walking her dog.    When asked if she is a nervous wreck 24/7 her  says it is up and down. She is better around people and able to put her game face on.   thinks that she is worse when idle.  Patient is not sure about this.  Reviewed with Azalea that she has been on Lexapro 10mg only a few weeks and addition of Mirtazapine since then.  Often there might be some increase in anxiety and sleep in the first few weeks of psychotropic medications. Encouraged her that things will get better and I realize how difficult it is for her to see that right now.  Encouraged that she not let her anxiety about things escalate. Imagine a balloon being blown up as the anxiety rises and stop her thoughts and image the balloon slowly losing air.  Agreed to check in with Azalea on Tuesday June 13.  She will be coming in on June 14th for an appointment.  Also checked in if she feels that she would be better if hospitalized to monitor anxiety and medication response.  This alarmed her and made her more anxious. Explained that I only wanted to make sure she was not thinking hospitalization was something she was considering.  Kim KHAN, APRN

## 2017-06-08 NOTE — MR AVS SNAPSHOT
After Visit Summary   6/8/2017    Jolynn Haji    MRN: [id number]           Patient Information     Date Of Birth          1955        Visit Information        Provider Department      6/8/2017 5:28 PM Helga Ibarra MD [facility]        Today's Diagnoses     Essential hypertension with goal blood pressure less than 140/90    -  1       Follow-ups after your visit        Your next 10 appointments already scheduled     Jun 14, 2017  9:15 AM CDT   Adult General Eval with FORTION Reaves CNS   Psychiatry Clinic ([facility])    [address]  [address] [facility] [id number] [phone]            Jul 05, 2017  6:00 PM CDT   (Arrive by 5:45 PM)   RETURN CONCUSSION with FORTINO Edmond [facility] ([facility])    [address]  [address] [facility] [id number] [phone]              Who to contact     If you have questions or need follow up information about today's clinic visit or your schedule please contact [facility] directly at [phone].  Normal or non-critical lab and imaging results will be communicated to you by MyChart, letter or phone within 4 business days after the clinic has received the results. If you do not hear from us within 7 days, please contact the clinic through MyChart or phone. If you have a critical or abnormal lab result, we will notify you by phone as soon as possible.  Submit refill requests through [employer] or call your pharmacy and they will forward the refill request to us. Please allow 3 business days for your refill to be completed.          Additional Information About Your Visit        MyChart Information     [employer] gives you secure access to your electronic health record. If you see a primary care provider, you can also send messages to your care team and make appointments. If you have questions,  please call your primary care clinic.  If you do not have a primary care provider, please call [phone] and they will assist you.        Care EveryWhere ID     This is your Care EveryWhere ID. This could be used by other organizations to access your [place] medical records  [id number]        Your Vitals Were     Last Period                   06/28/2001            Blood Pressure from Last 3 Encounters:   06/08/17 132/78   06/02/17 157/84   06/01/17 138/80    Weight from Last 3 Encounters:   06/02/17 147 lb 8 oz (66.9 kg)   05/11/17 151 lb 12.8 oz (68.9 kg)   05/05/17 152 lb (68.9 kg)              Today, you had the following     No orders found for display       Primary Care Provider Office Phone # Fax #    Helga Ibarra [phone]       [facility] [address] [place] [address]        Thank you!     Thank you for choosing [facility]  for your care. Our goal is always to provide you with excellent care. Hearing back from our patients is one way we can continue to improve our services. Please take a few minutes to complete the written survey that you may receive in the mail after your visit with us. Thank you!             Your Updated Medication List - Protect others around you: Learn how to safely use, store and throw away your medicines at www.disposemymeds.org.          This list is accurate as of: 6/8/17  5:30 PM.  Always use your most recent med list.                   Brand Name Dispense Instructions for use    acetaminophen 500 MG tablet    TYLENOL    100 tablet    Take 2 tablets (1,000 mg) by mouth every 6 hours as needed for mild pain       albuterol 108 (90 BASE) MCG/ACT Inhaler    PROAIR HFA/PROVENTIL HFA/VENTOLIN HFA    1 Inhaler    Inhale 2 puffs into the lungs every 6 hours as needed for shortness of breath / dyspnea       escitalopram 10 MG tablet    LEXAPRO    30 tablet    Take 1 tablet (10 mg) by mouth daily        fluticasone 110 MCG/ACT Inhaler    FLOVENT HFA    1 Inhaler    Inhale 2 puffs into the lungs 2 times daily 2 puffs       fluticasone 50 MCG/ACT spray    FLONASE    1 Bottle    Spray 1-2 sprays into both nostrils daily       lisinopril 5 MG tablet    PRINIVIL/ZESTRIL    60 tablet    Take 2 tablets (10 mg) by mouth daily       loratadine 10 MG capsule     30 capsule    Take 10 mg by mouth daily       LORazepam 1 MG tablet    ATIVAN    20 tablet    Take 1-2 tablets (1-2 mg) by mouth At Bedtime       metroNIDAZOLE 0.75 % cream    METROCREAM    45 g    APPLY TOPICALLY TWO TIMES A DAY TO FACE       mirtazapine 15 MG tablet    REMERON     Take 1 tablet (15 mg) by mouth At Bedtime Will try adding to Seroquel at bedtime but may stop if not tolerated after the first night.       naproxen sodium 220 MG tablet    ANAPROX    60 tablet    Take 1-2 tablets twice a day as needed for headache       ondansetron 4 MG ODT tab    ZOFRAN ODT    20 tablet    Take 1-2 tablets (4-8 mg) by mouth every 8 hours as needed for nausea       QUEtiapine 25 MG tablet    SEROQUEL    60 tablet    Take 2 tablets (50 mg) by mouth At Bedtime

## 2017-06-09 ENCOUNTER — TELEPHONE (OUTPATIENT)
Dept: SURGERY | Facility: CLINIC | Age: 62
End: 2017-06-09

## 2017-06-09 NOTE — TELEPHONE ENCOUNTER
"Having a good day today.  Yesterday was terrible.  Does ok with friends and family.  [place].      Happy can fall asleep but still wakes up at times in panic attack.  Knows she has to settle it down.  Gets up and sits in chair and does deep breathing and try to get mind off of the panic thing.    Still stressed about the calm lorena meditation, but does use the nature sounds to help sleep at night.    Walking dog Casper more, walking seems to calm her down.  She is doing the gluten free diet but still do disorganized in thought to do any cooking.      She was very stressed re conversation she had earlier in the week about inpatient hospitalization for her anxiety.  We discussed this at length and that she doesn't need to perseverate on this as it is currently a hypothetical situation.  Also, discussed that Kim and ANNY communicate regularly, and if we decide she needs to go to the hospital, she needs to trust us and go, that she does not need to know what her \"limits\" are or how \"too far out of control\" is defined, but that Kim and ANNY will tell her.    "

## 2017-06-13 ENCOUNTER — TELEPHONE (OUTPATIENT)
Dept: PSYCHIATRY | Facility: CLINIC | Age: 62
End: 2017-06-13

## 2017-06-14 ENCOUNTER — OFFICE VISIT (OUTPATIENT)
Dept: PSYCHIATRY | Facility: CLINIC | Age: 62
End: 2017-06-14
Attending: CLINICAL NURSE SPECIALIST
Payer: COMMERCIAL

## 2017-06-14 VITALS
SYSTOLIC BLOOD PRESSURE: 126 MMHG | DIASTOLIC BLOOD PRESSURE: 82 MMHG | HEART RATE: 103 BPM | WEIGHT: 142.4 LBS | BODY MASS INDEX: 21.65 KG/M2

## 2017-06-14 DIAGNOSIS — F41.9 ANXIETY DISORDER, UNSPECIFIED TYPE: ICD-10-CM

## 2017-06-14 DIAGNOSIS — F43.23 ADJUSTMENT DISORDER WITH MIXED ANXIETY AND DEPRESSED MOOD: ICD-10-CM

## 2017-06-14 DIAGNOSIS — Z79.899 ENCOUNTER FOR LONG-TERM (CURRENT) USE OF MEDICATIONS: ICD-10-CM

## 2017-06-14 DIAGNOSIS — F41.1 GAD (GENERALIZED ANXIETY DISORDER): Primary | ICD-10-CM

## 2017-06-14 PROCEDURE — 99212 OFFICE O/P EST SF 10 MIN: CPT | Mod: ZF

## 2017-06-14 RX ORDER — ESCITALOPRAM OXALATE 10 MG/1
10 TABLET ORAL DAILY
Qty: 30 TABLET | Refills: 5 | Status: SHIPPED | OUTPATIENT
Start: 2017-06-14 | End: 2017-10-25

## 2017-06-14 RX ORDER — QUETIAPINE FUMARATE 25 MG/1
50 TABLET, FILM COATED ORAL AT BEDTIME
Qty: 60 TABLET | Refills: 5 | Status: SHIPPED | OUTPATIENT
Start: 2017-06-14 | End: 2017-10-25

## 2017-06-14 RX ORDER — MIRTAZAPINE 30 MG/1
30 TABLET, FILM COATED ORAL AT BEDTIME
Qty: 30 TABLET | Refills: 5 | Status: SHIPPED | OUTPATIENT
Start: 2017-06-14 | End: 2017-10-25

## 2017-06-14 ASSESSMENT — PAIN SCALES - GENERAL: PAINLEVEL: NO PAIN (0)

## 2017-06-14 NOTE — MR AVS SNAPSHOT
After Visit Summary   6/14/2017    Jolynn Haji    MRN: [id number]           Patient Information     Date Of Birth          1955        Visit Information        Provider Department      6/14/2017 9:15 AM Kim Brantley APRN [facility]ry [facility]        Today's Diagnoses     TIFFANY (generalized anxiety disorder)    -  1    Adjustment disorder with mixed anxiety and depressed mood- mild at this time        Anxiety disorder, unspecified type        Encounter for long-term (current) use of medications           Follow-ups after your visit        Follow-up notes from your care team     Return in about 4 weeks (around 7/12/2017).      Your next 10 appointments already scheduled     Jul 05, 2017  6:00 PM CDT   (Arrive by 5:45 PM)   RETURN CONCUSSION with FORTINO Edmond [facility] ([facility] [place])    [address]  [address] [facility] [address] [phone]            Jul 25, 2017  8:45 AM CDT   Adult Med Follow UP with FORTINO Reaves CNS   Psychiatry Clinic ([facility])    [address]  [address] [facility] [id number] [phone]            Aug 07, 2017 10:00 AM CDT   New Visit with Ashley Redmond PsyD   [facility] [facility]    [address] [facility] [address] [phone]              Future tests that were ordered for you today     Open Future Orders        Priority Expected Expires Ordered    TSH Routine  6/15/2018 6/15/2017    T4 FREE Routine  6/15/2018 6/15/2017    Comprehensive metabolic panel Routine  6/15/2018 6/15/2017            Who to contact     Please call your clinic at [phone] to:    Ask questions about your health    Make or cancel appointments    Discuss your medicines    Learn about your test results    Speak to your doctor   If you have compliments or concerns about an experience at your clinic,  or if you wish to file a complaint, please contact [facility] Patient Relations at [phone] or email us at [email or web address]         Additional Information About Your Visit        [employer] Information     [employer] gives you secure access to your electronic health record. If you see a primary care provider, you can also send messages to your care team and make appointments. If you have questions, please call your primary care clinic.  If you do not have a primary care provider, please call [phone] and they will assist you.      [employer] is an electronic gateway that provides easy, online access to your medical records. With [employer], you can request a clinic appointment, read your test results, renew a prescription or communicate with your care team.     To access your existing account, please contact your [facility] or call [phone] for assistance.        Care EveryWhere ID     This is your Care EveryWhere ID. This could be used by other organizations to access your [place] medical records  [id number]        Your Vitals Were     Pulse Last Period BMI (Body Mass Index)             103 06/28/2001 21.65 kg/m2          Blood Pressure from Last 3 Encounters:   06/14/17 126/82   06/08/17 132/78   06/02/17 157/84    Weight from Last 3 Encounters:   06/14/17 64.6 kg (142 lb 6.4 oz)   06/02/17 66.9 kg (147 lb 8 oz)   05/23/17 68 kg (150 lb)                 Today's Medication Changes          These changes are accurate as of: 6/14/17 11:59 PM.  If you have any questions, ask your nurse or doctor.               These medicines have changed or have updated prescriptions.        Dose/Directions    mirtazapine 30 MG tablet   Commonly known as:  REMERON   This may have changed:    - medication strength  - how much to take  - additional instructions   Used for:  TIFFANY (generalized anxiety disorder), Adjustment disorder with mixed anxiety and depressed  mood   Changed by:  Kim Brantley APRN CNS        Dose:  30 mg   Take 1 tablet (30 mg) by mouth At Bedtime   Quantity:  30 tablet   Refills:  5            Where to get your medicines      These medications were sent to [facility] [address] [facility]  [address] [facility] [address]     Phone:  [phone]     escitalopram 10 MG tablet    mirtazapine 30 MG tablet    QUEtiapine 25 MG tablet                Primary Care Provider Office Phone # Fax #    Helga Ibarra [phone]       [facility] [address] [place] [address]        Thank you!     Thank you for choosing PSYCHIATRY CLINIC  for your care. Our goal is always to provide you with excellent care. Hearing back from our patients is one way we can continue to improve our services. Please take a few minutes to complete the written survey that you may receive in the mail after your visit with us. Thank you!             Your Updated Medication List - Protect others around you: Learn how to safely use, store and throw away your medicines at www.disposemymeds.org.          This list is accurate as of: 6/14/17 11:59 PM.  Always use your most recent med list.                   Brand Name Dispense Instructions for use    acetaminophen 500 MG tablet    TYLENOL    100 tablet    Take 2 tablets (1,000 mg) by mouth every 6 hours as needed for mild pain       albuterol 108 (90 BASE) MCG/ACT Inhaler    PROAIR HFA/PROVENTIL HFA/VENTOLIN HFA    1 Inhaler    Inhale 2 puffs into the lungs every 6 hours as needed for shortness of breath / dyspnea       escitalopram 10 MG tablet    LEXAPRO    30 tablet    Take 1 tablet (10 mg) by mouth daily       fluticasone 110 MCG/ACT Inhaler    FLOVENT HFA    1 Inhaler    Inhale 2 puffs into the lungs 2 times daily 2 puffs       fluticasone 50 MCG/ACT spray    FLONASE    1 Bottle    Spray 1-2 sprays into both nostrils daily        lisinopril 5 MG tablet    PRINIVIL/ZESTRIL    60 tablet    Take 2 tablets (10 mg) by mouth daily       loratadine 10 MG capsule     30 capsule    Take 10 mg by mouth daily       LORazepam 1 MG tablet    ATIVAN    20 tablet    Take 1-2 tablets (1-2 mg) by mouth At Bedtime       metroNIDAZOLE 0.75 % cream    METROCREAM    45 g    APPLY TOPICALLY TWO TIMES A DAY TO FACE       mirtazapine 30 MG tablet    REMERON    30 tablet    Take 1 tablet (30 mg) by mouth At Bedtime       naproxen sodium 220 MG tablet    ANAPROX    60 tablet    Take 1-2 tablets twice a day as needed for headache       ondansetron 4 MG ODT tab    ZOFRAN ODT    20 tablet    Take 1-2 tablets (4-8 mg) by mouth every 8 hours as needed for nausea       QUEtiapine 25 MG tablet    SEROQUEL    60 tablet    Take 2 tablets (50 mg) by mouth At Bedtime

## 2017-06-14 NOTE — NURSING NOTE
Chief Complaint   Patient presents with     Recheck Medication     TIFFANY (generalized anxiety disorder)      Reviewed allergies, smoking status, and pharmacy preference  Obtained weight, blood pressure and heart rate     Cuba Rowe LPN

## 2017-06-14 NOTE — PROGRESS NOTES
Outpatient Psychiatry Progress Note     Provider: FORTINO Reaves CNS  Date: 2017  Service:  Medication follow up with counseling.   Patient Identification: Jolynn Haji  : 1955   MRN: [id number]    Jolynn Haji is a 61 year old year old female who presents for ongoing psychiatric care.  Jolynn Haji was last seen in clinic on 17 for intake appointment   At that time,   Assessment & Plan      Assessment:   Jolynn Haji is a 61 year old female who presents for significant anxiety about her health after concussion and insomnia though pt reported some anxiety and insomnia prior to concussion when increased stress.  However, symptoms of anxiety seemed to be exacerbated significantly after concussion on 2017.  Pt had tried multiple medications in last 1 month for anxiety and sleep that did not seemed to help though pt reported one day improvement of insomnia with Seroquel 50 mg HS PRN last night.  We reviewed current medication and determined addition of Lexapro may be beneficial for the treatment of anxiety and Remeron for insomnia.  We discussed Lexapro may take up to 4 weeks to see improvement in anxiety.  Pt was ruminating and perseverant about her sleep and reassured that if she does not feel any improvement in insomnia, she should contact this writer in 3 days.  But will check in with her tomorrow via MyChart and phone calls (as MyChart increases anxiety).  We also discussed that Remeron may also be beneficial in increasing her appetite and anxiety.  It was also determined that Seroquel will be discontinued at this time due to possible cardiac and metabolic effects in the future.  Also encouraged pt to continue current medication regimen and avoid Ativan to avoid fall at night.     Diagnosis  Axis 1: general anxiety disorder vs anxiety due to medical condition, insomnia  Axis 2: deferred  Axis 3: See problem list in the medical record  Axis 4: recent health problem  Axis 5: 41-50   Serious symptoms, impaired functioning     Plan:    Medication: Start Lexapro 10 mg daily for anxiety and Remeron 15 mg HS for insomnia. Discontinue Seroquel. May use Lorazepam 1mg PRN  OTC Recommendations: Continue Tylenol as need for headache  Lab Orders:  none  Referrals: none  Release of Information: none  Future Treatment Considerations: per symptoms  Return for Follow Up: return in 3 weeks      ____________________________________________________________________________________________________________________________________________    06/14/2017   Patient is seen with her  and sister.  They report that she has the hardest times in the am.  Tough time at 7 am and hangs until the afternoon.   Azalea continues to be very concerned that she is decompensating and is not succeeding in getting better.  Discussed with her  and sister what patient's functioning was prior to head injury.  Her  reported that overall anxiety did not cause problems but sometimes they both worried about their children who have diabetes and when a daughter had a difficult pregnancy.  Azalea's sister noted though that their mother had Grave's disease and did not sleep much. There home growing up was always immaculate and things always done ahead of time. Her sister reports that Azalea is much like this - always hosts the family get togethers, keeps her house up with everything in place and organized and plans things out way ahead of time. We discussed that with the head injury for the first time in her life Azalea is not able to manage all these things and this escalates her anxiety.  Patient reports continued difficulty organizing cooking meals, making phone calls, using My Chart, shopping. Her  and sister report that she is able to do all these things but has great anticipatory anxiety. Azalea worries that her weight loss will cause her brain to shrink and that medication or nutritional deficiency has caused a dark  line on her right thumb that looks to me like it might be ink from a pen or marker.   She is worried that her insurance will not cover her medical care and they will spend all there hard earned savings on health care.  Her  is not worried about this and has encouraged Azalea not to worry about it.   Patient was able to acknowledge that she her thinking goes from one worry to another, almost as if can't feel relaxed or comforted by thinking realistically.  She is now able to use the anxiety lorena recommended by Magdalena Yee NP and does find this helpful.  Side effects of medication include: constipation. She is taking several mild stool softeners  Psychiatric Review of Systems:  The patient endorses symptoms of depression: In the last 2 weeks per PH 9 several days feeling depressed, feelings of failure. More than 1/2 days anhedonia, fidgety, thoughts of being better off dead, denies SI.  Nearly everyday sleep disturbance, fatigue, appetite problems, concentration problems  She  patient endorses symptoms of anxiety : see subjective  She endorses symptoms of reginald including none.    She endorses symptoms of psychosis including no psychotic symptoms.       Review of Medical Systems:  Sleep: getting on average 4 to 5 hours of sleep before waking, usually able to fall back to sleep.  Goes to bed around 10 pm and up at 7 am.  Energy: low  Concentration: continued difficulty especially with planning ahead and thinking through steps of an activity.  Appetite: continued weight lose but is not underweight. Has been on gluten free diet and  reports both of them have lost weight and meal planning is more difficult. She has not noticed improvement in GI symptoms, still taking beano and gas x.  GI Concerns: see appetite  Cardiac concerns: none  Neurological concerns: no new concerns  Other medical concerns: no new concerns  Current Substance Use:  Alcohol:not reviewed, no history of abuse or misuse  Other  drugs:none  Caffeine:not reviewed  Nicotine: none  Past Medical History:   Past Medical History:   Diagnosis Date     Abnormal glandular Papanicolaou smear of cervix      Abnormal glandular Papanicolaou smear of cervix- ASCUS in 2000 12/5/2003    ASC H- 2000     Basal cell carcinoma      Basal cell carcinoma of leg, right 5/2016     Hidradenitis     left  groin     HSIL (high grade squamous intraepithelial lesion) on Pap smear of cervix 7/5/2000    Normal paps from 2001 to 2016/cc     Hypertension goal BP (blood pressure) < 140/90      OA (osteoarthritis) of knee     moderate     Postmenopausal since 7/2008      Shingles 7/23/2010    left      Synovial cyst of popliteal space      Patient Active Problem List   Diagnosis     Essential hypertension with goal blood pressure less than 140/90     Hidradenitis     Localized osteoarthritis of hand     Synovial cyst of popliteal space     DERMATOPHYTOSIS OF NAIL- toenails      Lipidosis     Osteopenia     Postmenopausal     NUMBNESS AND TINGLING OF FOOT - bilateral -mild      CARDIOVASCULAR SCREENING; LDL GOAL LESS THAN 160     Numbness of feet- distal feet R>L      Advanced directives, counseling/discussion     Stress incontinence, female - mild     Cough     Acute bronchitis- recurrent- ? related to mold in school building- pt has since retired and no further bronchitis     Rosacea     History of migraine headaches- assoc. with nausea/vomiting - resolved around menopause - were  premenstrual      Dupuytren's disease of palm - right 4th digit flexor tendon     Sun-damaged skin     Seasonal allergic rhinitis     Basal cell carcinoma of leg, right     Concussion with loss of consciousness, unspecified duration, initial encounter     Adjustment disorder with mixed anxiety and depressed mood- mild at this time     Family history of celiac disease     TIFFANY (generalized anxiety disorder)     Insomnia       Allergies:   Allergies   Allergen Reactions     Augmentin Itching      itching, rash     Fosamax Diarrhea     Diarrhea, stomach cramps, jaw pain           Current Medications     Current Outpatient Prescriptions Ordered in [facility]   Medication Sig Dispense Refill     mirtazapine (REMERON) 15 MG tablet Take 1 tablet (15 mg) by mouth At Bedtime Will try adding to Seroquel at bedtime but may stop if not tolerated after the first night.       escitalopram (LEXAPRO) 10 MG tablet Take 1 tablet (10 mg) by mouth daily 30 tablet 0     QUEtiapine (SEROQUEL) 25 MG tablet Take 2 tablets (50 mg) by mouth At Bedtime 60 tablet 1     LORazepam (ATIVAN) 1 MG tablet Take 1-2 tablets (1-2 mg) by mouth At Bedtime 20 tablet 0     lisinopril (PRINIVIL/ZESTRIL) 5 MG tablet Take 2 tablets (10 mg) by mouth daily 60 tablet 1     acetaminophen (TYLENOL) 500 MG tablet Take 2 tablets (1,000 mg) by mouth every 6 hours as needed for mild pain 100 tablet 0     naproxen sodium (ANAPROX) 220 MG tablet Take 1-2 tablets twice a day as needed for headache 60 tablet      ondansetron (ZOFRAN ODT) 4 MG ODT tab Take 1-2 tablets (4-8 mg) by mouth every 8 hours as needed for nausea 20 tablet 1     fluticasone (FLONASE) 50 MCG/ACT spray Spray 1-2 sprays into both nostrils daily 1 Bottle 11     loratadine 10 MG capsule Take 10 mg by mouth daily 30 capsule      albuterol (PROAIR HFA, PROVENTIL HFA, VENTOLIN HFA) 108 (90 BASE) MCG/ACT inhaler Inhale 2 puffs into the lungs every 6 hours as needed for shortness of breath / dyspnea 1 Inhaler 5     fluticasone (FLOVENT HFA) 110 MCG/ACT inhaler Inhale 2 puffs into the lungs 2 times daily 2 puffs 1 Inhaler 12     metroNIDAZOLE (METROCREAM) 0.75 % cream APPLY TOPICALLY TWO TIMES A DAY TO FACE 45 g 5     No current [facility]-ordered facility-administered medications on file.         Mental Status Exam     Appearance:  Casually dressed and Well groomed  Behavior/relationship to examiner/demeanor:  Cooperative, nervous, at times distraught  Orientation: Oriented to person, place, time and  "situation  Psychomotor: normal  Speech Rate:  Normal  Speech Spontaneity:  Normal  Mood:  \"I'm not getting better\"  Affect:  Anxious/Nervous  Thought Process (Associations):  Perseverative  Thought Content:  no overt psychosis, denies suicidal ideation, intent or thoughts and patient does not appear to be responding to internal stimuli  Abnormal Perception:  Due to anxiety unable to see things realistically  Attention/Concentration:  Fair  Language:  Intact  Insight:  Adequate  Judgment:  Good      Results     Vital signs: /82  Pulse 103  Wt 64.6 kg (142 lb 6.4 oz)  LMP 06/28/2001  BMI 21.65 kg/m2    Laboratory Data:  no new data    Assessment & Plan      Jolynn Haji is seen today for follow up and reports she thinks she continues to decompensate with concerns about memory, cognition, weight loss due to decrease in appetite.  Azalea was accompanied by her sister and her . This was helpful to gain information about functioning prior to the head injury.  Although Azalea continues to have great difficulty with anxiety she has only been on Lexapro 10 mg for 2 weeks and Mirtazapine 15 mg for about 12 days.  I reviewed with patient and her family that at this point with these medications it is normal that there is only mild improvement and maybe periods of increased anxiety. I reviewed improvements I have noticed- side effects of medication have subsided except for constipation, she is sleeping better. She is able to engage in activities once they have started.   We did some work on changing her thoughts from negative to positive. For example - she is anxious that her family is not always available to her and that she is a burden to them.  In reality her family will be there for her if she needs them and she can easily contact them. Her family feels privileged, as she would feel toward them, to be able to help her through a difficult time.  Instead of fear that she will run out of medication if she doesn't " have at least 10 pills left, to know that she will and has in the past been able to get medications so that she does not run out at the last minute.   Discussed thinking in terms of thought stopping when she thinks of things with negative outcome and changing to a positive and realistic outcome. Acknowledged that this is a difficult task for her but will become easier as she works at it.    Diagnosis  Axis 1: TIFFANY, Adjustment disorder with mixed anxiety and depression  Axis 2: none  Axis 3: See problem list in the medical record    Plan:  Medication: Increase Mirtazapine to 30 mg, continue Lexapro 10 mg and Seroquel 50 mg  OTC Recommendations: none  Lab Orders:  Consider TSH,Free ST.  Referrals: Ashley Amor or other therapist at [facility]  Release of Information: none  Future Treatment Considerations:per response to increase in Mirtazapine. May consider further increase in Lexapro and or increase or decrease in Seroquel.  Return for Follow Up:4 weeks   The risks, benefits, alternatives and side effects have been discussed and are understood by the patient. The patient understands the risks of using street drugs or alcohol. There are no medical contraindications, the patient agrees to treatment, and has the capacity to do so. The patient understands to call 911 or come to the nearest ED if life threatening or urgent symptoms present.  Over 50% of this time was spent counseling the patient and/or coordinating care regarding review of social and occupational functioning.  In addition patient was counseled on health and wellness practices to augment medication treatment of symptoms. See note for details.    Kim Brantley, APRN CNS 6/14/2017

## 2017-06-19 ENCOUNTER — TELEPHONE (OUTPATIENT)
Dept: FAMILY MEDICINE | Facility: CLINIC | Age: 62
End: 2017-06-19

## 2017-06-19 ENCOUNTER — ALLIED HEALTH/NURSE VISIT (OUTPATIENT)
Dept: FAMILY MEDICINE | Facility: CLINIC | Age: 62
End: 2017-06-19
Payer: COMMERCIAL

## 2017-06-19 VITALS — DIASTOLIC BLOOD PRESSURE: 54 MMHG | SYSTOLIC BLOOD PRESSURE: 100 MMHG

## 2017-06-19 DIAGNOSIS — Z79.899 ENCOUNTER FOR LONG-TERM (CURRENT) USE OF MEDICATIONS: ICD-10-CM

## 2017-06-19 DIAGNOSIS — I10 ESSENTIAL HYPERTENSION WITH GOAL BLOOD PRESSURE LESS THAN 140/90: Primary | ICD-10-CM

## 2017-06-19 LAB
ALBUMIN SERPL-MCNC: 3.7 G/DL (ref 3.4–5)
ALP SERPL-CCNC: 60 U/L (ref 40–150)
ALT SERPL W P-5'-P-CCNC: 23 U/L (ref 0–50)
ANION GAP SERPL CALCULATED.3IONS-SCNC: 11 MMOL/L (ref 3–14)
AST SERPL W P-5'-P-CCNC: 12 U/L (ref 0–45)
BILIRUB SERPL-MCNC: 0.4 MG/DL (ref 0.2–1.3)
BUN SERPL-MCNC: 17 MG/DL (ref 7–30)
CALCIUM SERPL-MCNC: 8.9 MG/DL (ref 8.5–10.1)
CHLORIDE SERPL-SCNC: 106 MMOL/L (ref 94–109)
CO2 SERPL-SCNC: 23 MMOL/L (ref 20–32)
CREAT SERPL-MCNC: 0.83 MG/DL (ref 0.52–1.04)
GFR SERPL CREATININE-BSD FRML MDRD: 69 ML/MIN/1.7M2
GLUCOSE SERPL-MCNC: 123 MG/DL (ref 70–99)
POTASSIUM SERPL-SCNC: 4.1 MMOL/L (ref 3.4–5.3)
PROT SERPL-MCNC: 6.3 G/DL (ref 6.8–8.8)
SODIUM SERPL-SCNC: 140 MMOL/L (ref 133–144)
T4 FREE SERPL-MCNC: 1.09 NG/DL (ref 0.76–1.46)
TSH SERPL DL<=0.05 MIU/L-ACNC: 0.75 MU/L (ref 0.4–4)

## 2017-06-19 PROCEDURE — 84439 ASSAY OF FREE THYROXINE: CPT | Performed by: FAMILY MEDICINE

## 2017-06-19 PROCEDURE — 99207 ZZC NO CHARGE NURSE ONLY: CPT | Performed by: FAMILY MEDICINE

## 2017-06-19 PROCEDURE — 36415 COLL VENOUS BLD VENIPUNCTURE: CPT | Performed by: FAMILY MEDICINE

## 2017-06-19 PROCEDURE — 84443 ASSAY THYROID STIM HORMONE: CPT | Performed by: FAMILY MEDICINE

## 2017-06-19 PROCEDURE — 80053 COMPREHEN METABOLIC PANEL: CPT | Performed by: FAMILY MEDICINE

## 2017-06-19 NOTE — NURSING NOTE
Jolynn Haji is enrolled/participating in the retail pharmacy Blood Pressure Goals Achievement Program (BPGAP).  Jolynn Haji was evaluated at [facility] on June 19, 2017 at which time her blood pressure was:    BP Readings from Last 3 Encounters:   06/19/17 100/54   06/08/17 132/78   06/02/17 157/84     Reviewed lifestyle modifications for blood pressure control and reduction: including making healthy food choices, managing weight, getting regular exercise, smoking cessation, reducing alcohol consumption, monitoring blood pressure regularly.     Jolynn Haji is experiencing symtoms: (dizzy... Possibly orthostatic hypotension.  Described it as feeling lightheaded when stands up suddenly.)    Follow-Up: Recheck 1-2 weeks.  Dose lisinopril was doubled around 5/20/17.      Recommendation to Provider: Pt needs refill on lisinopril.  Please decide if pt should continue at 10 mg or go back to 5 mg.  Notice that BP is considerably higher at Dr Santana office when taken.  Pt admits that she is always feeling very anxious at that office.      Jolynn Haji was evaluated for enrollment into the PGEN study today.    Patient eligible for enrollment:  No  Patient interested in enrollment:  No    Completed by: Thank you,  Pallavi Bustos RPh, Mgr [facility] [place] [phone]

## 2017-06-19 NOTE — MR AVS SNAPSHOT
After Visit Summary   6/19/2017    Jolynn Haji    MRN: [id number]           Patient Information     Date Of Birth          1955        Visit Information        Provider Department      6/19/2017 4:23 PM Helga Ibarra MD [facility]        Today's Diagnoses     Essential hypertension with goal blood pressure less than 140/90    -  1       Follow-ups after your visit        Your next 10 appointments already scheduled     Jul 05, 2017  6:00 PM CDT   (Arrive by 5:45 PM)   RETURN CONCUSSION with FORTINO Edmond [facility] Concussion ([facility])    [address]  [address] [facility] [address]   [phone]            Jul 25, 2017  8:45 AM CDT   Adult Med Follow UP with FORTINO Reaves CNS   Psychiatry Clinic ([facility])    [address]  [address] [facility] [address]   [phone]            Aug 07, 2017 10:00 AM CDT   New Visit with Ashley Redmond PsyD   [facility] [place] ([facility])    [address] [address] [phone]              Who to contact     If you have questions or need follow up information about today's clinic visit or your schedule please contact [facility] directly at [phone].  Normal or non-critical lab and imaging results will be communicated to you by MyChart, letter or phone within 4 business days after the clinic has received the results. If you do not hear from us within 7 days, please contact the clinic through MyChart or phone. If you have a critical or abnormal lab result, we will notify you by phone as soon as possible.  Submit refill requests through [facility] or call your pharmacy and they will forward the refill request to us. Please allow 3 business days for your refill to be completed.          Additional Information About Your Visit        MyChart  Information     [facility] gives you secure access to your electronic health record. If you see a primary care provider, you can also send messages to your care team and make appointments. If you have questions, please call your primary care clinic.  If you do not have a primary care provider, please call [phone] and they will assist you.        Care EveryWhere ID     This is your Care EveryWhere ID. This could be used by other organizations to access your [facility] medical records  [id number]        Your Vitals Were     Last Period                   06/28/2001            Blood Pressure from Last 3 Encounters:   06/19/17 100/54   06/08/17 132/78   06/02/17 157/84    Weight from Last 3 Encounters:   06/02/17 147 lb 8 oz (66.9 kg)   05/11/17 151 lb 12.8 oz (68.9 kg)   05/05/17 152 lb (68.9 kg)              Today, you had the following     No orders found for display       Primary Care Provider Office Phone # Fax #    Helga Ibarra [phone]       [facility] [address] [place] [id number]        Thank you!     Thank you for choosing [facility]  for your care. Our goal is always to provide you with excellent care. Hearing back from our patients is one way we can continue to improve our services. Please take a few minutes to complete the written survey that you may receive in the mail after your visit with us. Thank you!             Your Updated Medication List - Protect others around you: Learn how to safely use, store and throw away your medicines at www.disposemymeds.org.          This list is accurate as of: 6/19/17  4:27 PM.  Always use your most recent med list.                   Brand Name Dispense Instructions for use    acetaminophen 500 MG tablet    TYLENOL    100 tablet    Take 2 tablets (1,000 mg) by mouth every 6 hours as needed for mild pain       albuterol 108 (90 BASE) MCG/ACT Inhaler    PROAIR HFA/PROVENTIL HFA/VENTOLIN HFA    1  Inhaler    Inhale 2 puffs into the lungs every 6 hours as needed for shortness of breath / dyspnea       escitalopram 10 MG tablet    LEXAPRO    30 tablet    Take 1 tablet (10 mg) by mouth daily       fluticasone 110 MCG/ACT Inhaler    FLOVENT HFA    1 Inhaler    Inhale 2 puffs into the lungs 2 times daily 2 puffs       fluticasone 50 MCG/ACT spray    FLONASE    1 Bottle    Spray 1-2 sprays into both nostrils daily       lisinopril 5 MG tablet    PRINIVIL/ZESTRIL    60 tablet    Take 2 tablets (10 mg) by mouth daily       loratadine 10 MG capsule     30 capsule    Take 10 mg by mouth daily       LORazepam 1 MG tablet    ATIVAN    20 tablet    Take 1-2 tablets (1-2 mg) by mouth At Bedtime       metroNIDAZOLE 0.75 % cream    METROCREAM    45 g    APPLY TOPICALLY TWO TIMES A DAY TO FACE       mirtazapine 30 MG tablet    REMERON    30 tablet    Take 1 tablet (30 mg) by mouth At Bedtime       naproxen sodium 220 MG tablet    ANAPROX    60 tablet    Take 1-2 tablets twice a day as needed for headache       ondansetron 4 MG ODT tab    ZOFRAN ODT    20 tablet    Take 1-2 tablets (4-8 mg) by mouth every 8 hours as needed for nausea       QUEtiapine 25 MG tablet    SEROQUEL    60 tablet    Take 2 tablets (50 mg) by mouth At Bedtime

## 2017-06-20 ENCOUNTER — TELEPHONE (OUTPATIENT)
Dept: PSYCHIATRY | Facility: CLINIC | Age: 62
End: 2017-06-20

## 2017-06-20 NOTE — TELEPHONE ENCOUNTER
T/C to patient. States she has been very busy with lots of things and working hard.  She is concerned that she is still gets anxious and and feels wound up during the day. Toward evening starts calming down and falls asleep almost right away. Wakes up a few times 2 to 3 times but falls back to sleep quickly.  Heaving breathing during the day and has tried the lorena and it doesn't always work. Rocking more during the day.   Appetite is about the same but able to eat more.    Has times she thinks that she would rather be dead but realizes this isn't really what she means, it is just due to the difficulty she has managing anxiety.  Agrees at this time to continue current medication and to have me call her on 6/27/17  Kim KHAN, APRN

## 2017-06-27 ENCOUNTER — TELEPHONE (OUTPATIENT)
Dept: PSYCHIATRY | Facility: CLINIC | Age: 62
End: 2017-06-27

## 2017-06-27 RX ORDER — ASPIRIN 81 MG
200 TABLET, DELAYED RELEASE (ENTERIC COATED) ORAL AT BEDTIME
Qty: 60 TABLET | Refills: 1 | COMMUNITY
Start: 2017-06-27 | End: 2017-09-14

## 2017-06-27 RX ORDER — SENNOSIDES 8.6 MG
1 TABLET ORAL
COMMUNITY
Start: 2017-06-27 | End: 2017-09-14

## 2017-06-27 NOTE — TELEPHONE ENCOUNTER
Phone call to Azalea to check on status. Notes improvement in many ways - more active, eating better, sleeping 6 hours.  During phone noted to much less anxious and less perseveration.  She notes she did have an extended period of feeling almost normal but in general continues to have a lot of anxiety.  More optimistic about insurance coverage.  Side effect noted of constipation. She is taking Colace at bedtime.  Has Senna but not using that.  Has bowel movement with colace about every other day.  Suggested she try Senna per directions on the supplement and see if this works better. Could also try taking Senna at night and colace in the am.  Will check in with her again on 7/18/17 when I am back from vacation. She is aware that if there is anything urgent she can call the clinic for recommendations.  Kim KHAN, APRN

## 2017-06-29 ENCOUNTER — ALLIED HEALTH/NURSE VISIT (OUTPATIENT)
Dept: FAMILY MEDICINE | Facility: CLINIC | Age: 62
End: 2017-06-29
Payer: COMMERCIAL

## 2017-06-29 VITALS — SYSTOLIC BLOOD PRESSURE: 134 MMHG | DIASTOLIC BLOOD PRESSURE: 78 MMHG

## 2017-06-29 DIAGNOSIS — I10 ESSENTIAL HYPERTENSION WITH GOAL BLOOD PRESSURE LESS THAN 140/90: Primary | ICD-10-CM

## 2017-06-29 PROCEDURE — 99207 ZZC NO CHARGE NURSE ONLY: CPT | Performed by: FAMILY MEDICINE

## 2017-06-29 NOTE — NURSING NOTE
Jolynn Haji is enrolled/participating in the retail pharmacy Blood Pressure Goals Achievement Program (BPGAP).  Jolynn Haji was evaluated at [facility] on June 29, 2017 at which time her blood pressure was:    BP Readings from Last 3 Encounters:   06/29/17 134/78   06/19/17 100/54   06/08/17 132/78     Reviewed lifestyle modifications for blood pressure control and reduction: including making healthy food choices, managing weight, getting regular exercise, smoking cessation, reducing alcohol consumption, monitoring blood pressure regularly.     Jolynn Haji is not experiencing symptoms.    Follow-Up: BP is at goal of < 140/90mmHg (patient 18+ years of age with or without diabetes).  Recommended follow-up at pharmacy in 6 months.     Recommendation to Provider: Pt has been taking only 5 mg lisinopril x 4 days.  She was experiencing a lot of dizziness when changing positions. She will continue doing 5 mg and recheck bp here in 2 weeks to verify still at goal.  We will then request a refill for 5 mg tablets please.    Jolynn Haji was evaluated for enrollment into the PGEN study today.    Patient eligible for enrollment:  No  Patient interested in enrollment:  No    Completed by: Thank you,  Pallavi Bustos okasna, Mgr [facility] [place] [phone]

## 2017-06-29 NOTE — MR AVS SNAPSHOT
After Visit Summary   6/29/2017    Jolynn Haji    MRN: [id number]           Patient Information     Date Of Birth          1955        Visit Information        Provider Department      6/29/2017 11:48 AM Helga Ibarra MD [facility]        Today's Diagnoses     Essential hypertension with goal blood pressure less than 140/90    -  1       Follow-ups after your visit        Your next 10 appointments already scheduled     Jul 05, 2017  6:00 PM CDT   (Arrive by 5:45 PM)   RETURN CONCUSSION with FORTINO Edmond [facility] ([facility])    [address]  [address] [facility] [address]   [phone]            Jul 25, 2017  8:45 AM CDT   Adult Med Follow UP with FORTINO Reaves CNS   Psychiatry Clinic ([facility])    [address]  [address] [facility] [address]   [phone]            Aug 07, 2017 10:00 AM CDT   New Visit with Ashley Redmond PsyD   [facility] [place] ([facility] [place])    [address] [address] [phone]              Who to contact     If you have questions or need follow up information about today's clinic visit or your schedule please contact [facility] directly at [phone].  Normal or non-critical lab and imaging results will be communicated to you by MyChart, letter or phone within 4 business days after the clinic has received the results. If you do not hear from us within 7 days, please contact the clinic through MyChart or phone. If you have a critical or abnormal lab result, we will notify you by phone as soon as possible.  Submit refill requests through [employer] or call your pharmacy and they will forward the refill request to us. Please allow 3 business days for your refill to be completed.          Additional Information About Your Visit        MyChart  Information     [employer]ito gives you secure access to your electronic health record. If you see a primary care provider, you can also send messages to your care team and make appointments. If you have questions, please call your primary care clinic.  If you do not have a primary care provider, please call [phone] and they will assist you.        Care EveryWhere ID     This is your Care EveryWhere ID. This could be used by other organizations to access your [place] medical records  [id number]        Your Vitals Were     Last Period                   06/28/2001            Blood Pressure from Last 3 Encounters:   06/29/17 134/78   06/19/17 100/54   06/08/17 132/78    Weight from Last 3 Encounters:   06/02/17 147 lb 8 oz (66.9 kg)   05/11/17 151 lb 12.8 oz (68.9 kg)   05/05/17 152 lb (68.9 kg)              Today, you had the following     No orders found for display       Primary Care Provider Office Phone # Fax #    Helga Ibarra [phone]       [facility] [address] [facility] [address] [facility] [address]        Equal Access to Services     [facility] FarmJOHN : Hadii aad ku hadasho Soomaali, waaxda luqadaha, qaybta kaalmada adeegyada, teri ly haymateon kaden herrera . So [facility] [phone].    ATENCIÓN: Si habla español, tiene a christensen disposición servicios gratuitos de asistencia lingüística. [facility] [phone].    We comply with applicable federal civil rights laws and [place] laws. We do not discriminate on the basis of race, color, national origin, age, disability sex, sexual orientation or gender identity.            Thank you!     Thank you for choosing [facility]  for your care. Our goal is always to provide you with excellent care. Hearing back from our patients is one way we can continue to improve our services. Please take a few minutes to complete the written survey that you may receive in the mail after your visit with us. Thank you!              Your Updated Medication List - Protect others around you: Learn how to safely use, store and throw away your medicines at www.disposemymeds.org.          This list is accurate as of: 6/29/17 11:50 AM.  Always use your most recent med list.                   Brand Name Dispense Instructions for use Diagnosis    acetaminophen 500 MG tablet    TYLENOL    100 tablet    Take 2 tablets (1,000 mg) by mouth every 6 hours as needed for mild pain    Concussion, with loss of consciousness of 30 minutes or less, initial encounter       albuterol 108 (90 BASE) MCG/ACT Inhaler    PROAIR HFA/PROVENTIL HFA/VENTOLIN HFA    1 Inhaler    Inhale 2 puffs into the lungs every 6 hours as needed for shortness of breath / dyspnea    Cough       docusate sodium 100 MG tablet    COLACE    60 tablet    Take 200 mg by mouth At Bedtime        escitalopram 10 MG tablet    LEXAPRO    30 tablet    Take 1 tablet (10 mg) by mouth daily    TIFFANY (generalized anxiety disorder)       fluticasone 110 MCG/ACT Inhaler    FLOVENT HFA    1 Inhaler    Inhale 2 puffs into the lungs 2 times daily 2 puffs    Cough       fluticasone 50 MCG/ACT spray    FLONASE    1 Bottle    Spray 1-2 sprays into both nostrils daily    Acute recurrent maxillary sinusitis       lisinopril 5 MG tablet    PRINIVIL/ZESTRIL    60 tablet    Take 2 tablets (10 mg) by mouth daily    Essential hypertension with goal blood pressure less than 140/90       loratadine 10 MG capsule     30 capsule    Take 10 mg by mouth daily    Seasonal allergic rhinitis       LORazepam 1 MG tablet    ATIVAN    20 tablet    Take 1-2 tablets (1-2 mg) by mouth At Bedtime    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       metroNIDAZOLE 0.75 % cream    METROCREAM    45 g    APPLY TOPICALLY TWO TIMES A DAY TO FACE    Rosacea       mirtazapine 30 MG tablet    REMERON    30 tablet    Take 1 tablet (30 mg) by mouth At Bedtime    TIFFANY (generalized anxiety disorder), Adjustment disorder with mixed anxiety and depressed mood        naproxen sodium 220 MG tablet    ANAPROX    60 tablet    Take 1-2 tablets twice a day as needed for headache    Concussion, with loss of consciousness of 30 minutes or less, initial encounter       ondansetron 4 MG ODT tab    ZOFRAN ODT    20 tablet    Take 1-2 tablets (4-8 mg) by mouth every 8 hours as needed for nausea    Dehydration, Concussion, with loss of consciousness of 30 minutes or less, subsequent encounter       QUEtiapine 25 MG tablet    SEROQUEL    60 tablet    Take 2 tablets (50 mg) by mouth At Bedtime    Anxiety disorder, unspecified type       sennosides 8.6 MG tablet    SENOKOT     Take 1 tablet by mouth nightly as needed for constipation

## 2017-07-05 ENCOUNTER — OFFICE VISIT (OUTPATIENT)
Dept: SURGERY | Facility: CLINIC | Age: 62
End: 2017-07-05

## 2017-07-05 VITALS
TEMPERATURE: 98.3 F | HEART RATE: 90 BPM | WEIGHT: 144.6 LBS | SYSTOLIC BLOOD PRESSURE: 120 MMHG | HEIGHT: 69 IN | OXYGEN SATURATION: 98 % | DIASTOLIC BLOOD PRESSURE: 79 MMHG | BODY MASS INDEX: 21.42 KG/M2

## 2017-07-05 DIAGNOSIS — S06.0X0S CONCUSSION WITHOUT LOSS OF CONSCIOUSNESS, SEQUELA (H): Primary | Chronic | ICD-10-CM

## 2017-07-05 ASSESSMENT — PAIN SCALES - GENERAL: PAINLEVEL: NO PAIN (0)

## 2017-07-05 NOTE — NURSING NOTE
"Chief Complaint   Patient presents with     RECHECK     concussion       Vitals:    07/05/17 1735   BP: 120/79   BP Location: Left arm   Patient Position: Chair   Cuff Size: Adult Regular   Pulse: 90   Temp: 98.3  F (36.8  C)   SpO2: 98%   Weight: 144 lb 9.6 oz   Height: 5' 8.5\"       Body mass index is 21.67 kg/(m^2).  Celeste Herrera MA                          "

## 2017-07-05 NOTE — LETTER
7/5/2017       RE: Jolynn Haji  [address] [place] [address]     Dear Colleague,    Thank you for referring your patient, Jolynn Haji, to the [facility] at [facility]. Please see a copy of my visit note below.    [facility] Follow up July 5, 2017    Assessment/Plan:  Jolynn Haji (Azalea) is a 61 year old yo female who presents for evaluation of a concussion.  She first saw me on May 5th.  We have been struggling with her chronic severe anxiety and insomnia, as well as GI issues (diarrhea, constipation) being exacerbated by a concussion she suffered on 4/9/17.  Her anxiety has been debilitating.  Ms Kim Small, CNS has been assisting us with her anxiety/insomnia, and her PCP Dr Ibarra has been managing her GI issues.      Overall she has made remarkable progress in the last month.  She is sucessfully spending time with friends, making dinner, babysitting her grandson (twice!).  She still is not comfortable driving, and finds ANY decision making anxiety provoking.  She is able to settle herself down, and is having fewer panic attacts per report.  She appears happy and proud of the progress she is making.     We discussed at length that feeling better does NOT mean starting to wean her medications and that all medication changes would be directed by Ms Small.  We also discussed that her residual concussion symptoms will likely dissipate  when her anxiety becomes more and more controlled: headache, fogginess, balance, sensitivity to noise.      At this time I will sign off her care and defer to Ms Small, her new therapist and her PCP Dr Ibarra  for any further management.  I did though emphasize that if she needed to come back to see me she is always welcome to do so for a recheck of her concussion issues.     AVS Instructions:  You are doing GREAT!!  Keep up with all the wonderful things you are doing for yourself.       --Continue all your meds as you are taking them now.  Any medication changes will come from Kim  -- Sleeping 6 hours a night is EXCELLENT!  -- If you get a headache, try some tylenol.  -- Keep up with your exercise and your socialization.  Friends are an important part of recovery.  -- Keep doing your Calm Cristhian.  It is working.\  -- Enjoy your time with Lb, he seems like a true blessing.  -- Not quite normal is OK.  Compare how you are feeling right now with how you felt 2 months ago.  You are a !  And improving rapidly!.    Call, or MyChart me any time you need to.  Know it may take me a couple days to get back to you.    You do not HAVE to see me any more, you are welcome to follow up with me if you feel it would help you.   Continue seeing Kim as long as directed by her.    I am excited you found a therapist.  Keep that appointment.      It has been a pleasure working with you.  Know that you are well and some anxiety is ok. You are now the manager of your health.  Keep up the good work!    HPI  Time/date of injury: 4/9/17  Mechanism: fall from syncope 2nd to dehydration.    Azalea has been seen weekly as well as multiple telephone contact made since her initial presentation.  Her condition worsened for the first few weeks and has started to improve since Ms Small has joined her care team.  She has not been to PT/OT, as this did not seem indicated based on her assessments.      She presents today  Excited and happy  Does calm cristhian, takes pills and falls asleep quickly.  Gets up at night but goes back to sleep fairly easily.  Most nights sleeps 6-7 hours per night. Eating better, gained a couple pounds.    At the beginning of the year was 162 lbs so improving.  [place], babysitting.  Having a few more headaches, start in back and wrap around.      August 7th have therapy appoitment scheduled  Still very anxeious with any change or adventure.    Anticipation much worse than the event.  Wants to know       Current medications:  Reconciled in chart today by clinic staff and reviewed by me.    REVIEW OF SYSTEMS:  Refer to DocFlowsheets:  Concussion symptoms  And HPI    OBJECTIVE:   LMP 06/28/2001    Wt Readings from Last 4 Encounters:   06/14/17 142 lb 6.4 oz   06/02/17 147 lb 8 oz   05/23/17 150 lb   05/11/17 151 lb 12.8 oz       EXAM:  GENERAL: alert, oriented to person, place, time.   PSYCHIATRIC: affect/mood normal, cooperative, normal judgement/insight and memory intact.  Anxious appearing.  Relaxed appearing   Neuro:  Alert and oriented  Strength 5/5 extremities    Neurologic/Visual:  TOAN: yes  EOMI: yes  Nystagmus: no  Painful eye movements: no  Convergence testing: Normal (</= 6 cm)    Coordination:       - Finger to Nose: normal       - Rapid Alternating Movements: normal    Balance Testing:       - Romberg: normal         Cognitive:  Immediate object recall: 4/4  Recall 4 objects at 5 minutes:3/4  Reverse months of the year: Yes    Spell world backwards: Yes   Backwards number string:  Yes     Time spent in one-on-one evaluation and discussion with patient regarding nature of problem, course, prior treatments, and therapeutic options, 75% of this 45 minute visit  was spent in counseling including this patients personal symptom triggers and education thereof.    Again, thank you for allowing me to participate in the care of your patient.      Sincerely,    FORTINO Edmond CNP

## 2017-07-05 NOTE — MR AVS SNAPSHOT
After Visit Summary   7/5/2017    Jolynn Haji    MRN: [id number]           Patient Information     Date Of Birth          1955        Visit Information        Provider Department      7/5/2017 6:00 PM Magdalena Santana APRN CNP [facility]        Care Instructions    You are doing GREAT!!  Keep up with all the wonderful things you are doing for yourself.      --Continue all your meds as you are taking them now.  Any medication changes will come from Kim  -- Sleeping 6 hours a night is EXCELLENT!  -- If you get a headache, try some tylenol.  -- Keep up with your exercise and your socialization.  Friends are an important part of recovery.  -- Keep doing your Calm Cristhian.  It is working.\  -- Enjoy your time with Lb, he seems like a true blessing.  -- Not quite normal is OK.  Compare how you are feeling right now with how you felt 2 months ago.  You are a !  And improving rapidly!.    Call, or MyChart me any time you need to.  Know it may take me a couple days to get back to you.    You do not HAVE to see me any more, you are welcome to follow up with me if you feel it would help you.   Continue seeing Kim as long as directed by her.    I am excited you found a therapist.  Keep that appointment.        It has been a pleasure working with you.  Know that you are well and some anxiety is ok. You are now the manager of your health.  Keep up the good work!          Follow-ups after your visit        Follow-up notes from your care team     Return if symptoms worsen or fail to improve.      Your next 10 appointments already scheduled     Jul 25, 2017  8:45 AM CDT   Adult Med Follow UP with FORTINO Reaves CNS   Psychiatry Clinic ([facility])    [address]  [address] [facility] [address]   [phone]            Aug 07, 2017 10:00 AM CDT   New Visit with Ashley Redmond PsyD   [facility] [place] ([facility]  "[place])    [address]  [place] [address] [phone]              Who to contact     Please call your clinic at [phone] to:    Ask questions about your health    Make or cancel appointments    Discuss your medicines    Learn about your test results    Speak to your doctor   If you have compliments or concerns about an experience at your clinic, or if you wish to file a complaint, please contact [facility] Patient Relations at [phone] or email us at [email or web address]         Additional Information About Your Visit        Huupy Information     [employer] gives you secure access to your electronic health record. If you see a primary care provider, you can also send messages to your care team and make appointments. If you have questions, please call your primary care clinic.  If you do not have a primary care provider, please call [phone] and they will assist you.      Huupy is an electronic gateway that provides easy, online access to your medical records. With Hu[employer]y, you can request a clinic appointment, read your test results, renew a prescription or communicate with your care team.     To access your existing account, please contact your [facility] Clinic or call [phone] for assistance.        Care EveryWhere ID     This is your Care EveryWhere ID. This could be used by other organizations to access your [place] medical records  [id number]        Your Vitals Were     Pulse Temperature Height Last Period Pulse Oximetry BMI (Body Mass Index)    90 98.3  F (36.8  C) 5' 8.5\" 06/28/2001 98% 21.67 kg/m2       Blood Pressure from Last 3 Encounters:   07/05/17 120/79   06/29/17 134/78   06/19/17 100/54    Weight from Last 3 Encounters:   07/05/17 144 lb 9.6 oz   06/14/17 142 lb 6.4 oz   06/02/17 147 lb 8 oz              Today, you had the following     No orders found for display       Primary Care " Provider Office Phone # Fax #    Helga Ibarra [phone]       [address] [address]        Equal Access to Services     CANDI AVILA : Jules edwards jauno Soemiliaali, waaxda luqadaha, qaybta kaalmada adeegyada, teri wangn behzaddimitry vu javier galvan. So [facility] [phone].    ATENCIÓN: Si habla español, tiene a christensen disposición servicios gratuitos de asistencia lingüística. Ll[facility] [phone].    We comply with applicable federal civil rights laws and [place] laws. We do not discriminate on the basis of race, color, national origin, age, disability sex, sexual orientation or gender identity.            Thank you!     Thank you for choosing [facility]  for your care. Our goal is always to provide you with excellent care. Hearing back from our patients is one way we can continue to improve our services. Please take a few minutes to complete the written survey that you may receive in the mail after your visit with us. Thank you!             Your Updated Medication List - Protect others around you: Learn how to safely use, store and throw away your medicines at www.disposemymeds.org.          This list is accurate as of: 7/5/17  6:37 PM.  Always use your most recent med list.                   Brand Name Dispense Instructions for use Diagnosis    acetaminophen 500 MG tablet    TYLENOL    100 tablet    Take 2 tablets (1,000 mg) by mouth every 6 hours as needed for mild pain    Concussion, with loss of consciousness of 30 minutes or less, initial encounter       albuterol 108 (90 BASE) MCG/ACT Inhaler    PROAIR HFA/PROVENTIL HFA/VENTOLIN HFA    1 Inhaler    Inhale 2 puffs into the lungs every 6 hours as needed for shortness of breath / dyspnea    Cough       docusate sodium 100 MG tablet    COLACE    60 tablet    Take 200 mg by mouth At Bedtime        escitalopram 10 MG tablet    LEXAPRO    30 tablet    Take 1 tablet (10 mg) by mouth  daily    TIFFANY (generalized anxiety disorder)       fluticasone 110 MCG/ACT Inhaler    FLOVENT HFA    1 Inhaler    Inhale 2 puffs into the lungs 2 times daily 2 puffs    Cough       fluticasone 50 MCG/ACT spray    FLONASE    1 Bottle    Spray 1-2 sprays into both nostrils daily    Acute recurrent maxillary sinusitis       lisinopril 5 MG tablet    PRINIVIL/ZESTRIL    60 tablet    Take 2 tablets (10 mg) by mouth daily    Essential hypertension with goal blood pressure less than 140/90       loratadine 10 MG capsule     30 capsule    Take 10 mg by mouth daily    Seasonal allergic rhinitis       LORazepam 1 MG tablet    ATIVAN    20 tablet    Take 1-2 tablets (1-2 mg) by mouth At Bedtime    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       metroNIDAZOLE 0.75 % cream    METROCREAM    45 g    APPLY TOPICALLY TWO TIMES A DAY TO FACE    Rosacea       mirtazapine 30 MG tablet    REMERON    30 tablet    Take 1 tablet (30 mg) by mouth At Bedtime    TIFFANY (generalized anxiety disorder), Adjustment disorder with mixed anxiety and depressed mood       naproxen sodium 220 MG tablet    ANAPROX    60 tablet    Take 1-2 tablets twice a day as needed for headache    Concussion, with loss of consciousness of 30 minutes or less, initial encounter       ondansetron 4 MG ODT tab    ZOFRAN ODT    20 tablet    Take 1-2 tablets (4-8 mg) by mouth every 8 hours as needed for nausea    Dehydration, Concussion, with loss of consciousness of 30 minutes or less, subsequent encounter       QUEtiapine 25 MG tablet    SEROQUEL    60 tablet    Take 2 tablets (50 mg) by mouth At Bedtime    Anxiety disorder, unspecified type       sennosides 8.6 MG tablet    SENOKOT     Take 1 tablet by mouth nightly as needed for constipation

## 2017-07-05 NOTE — PROGRESS NOTES
[facility] Follow up July 5, 2017    Assessment/Plan:  Jolynn Haji (Azalea) is a 61 year old yo female who presents for evaluation of a concussion.  She first saw me on May 5th.  We have been struggling with her chronic severe anxiety and insomnia, as well as GI issues (diarrhea, constipation) being exacerbated by a concussion she suffered on 4/9/17.  Her anxiety has been debilitating.  Ms Kim Small, CNS has been assisting us with her anxiety/insomnia, and her PCP Dr Ibarra has been managing her GI issues.      Overall she has made remarkable progress in the last month.  She is sucessfully spending time with friends, making dinner, babysitting her grandson (twice!).  She still is not comfortable driving, and finds ANY decision making anxiety provoking.  She is able to settle herself down, and is having fewer panic attacts per report.  She appears happy and proud of the progress she is making.     We discussed at length that feeling better does NOT mean starting to wean her medications and that all medication changes would be directed by Ms Mackenziealba.  We also discussed that her residual concussion symptoms will likely dissipate  when her anxiety becomes more and more controlled: headache, fogginess, balance, sensitivity to noise.      At this time I will sign off her care and defer to Ms Staci, her new therapist and her PCP Dr Ibarra  for any further management.  I did though emphasize that if she needed to come back to see me she is always welcome to do so for a recheck of her concussion issues.     AVS Instructions:    You are doing GREAT!!  Keep up with all the wonderful things you are doing for yourself.      --Continue all your meds as you are taking them now.  Any medication changes will come from Kim  -- Sleeping 6 hours a night is EXCELLENT!  -- If you get a headache, try some tylenol.  -- Keep up with your exercise and your socialization.  Friends are an important part of  recovery.  -- Keep doing your Calm Cristhian.  It is working.\  -- Enjoy your time with Lb, he seems like a true blessing.  -- Not quite normal is OK.  Compare how you are feeling right now with how you felt 2 months ago.  You are a !  And improving rapidly!.    Call, or MyChart me any time you need to.  Know it may take me a couple days to get back to you.    You do not HAVE to see me any more, you are welcome to follow up with me if you feel it would help you.   Continue seeing Kim as long as directed by her.    I am excited you found a therapist.  Keep that appointment.      It has been a pleasure working with you.  Know that you are well and some anxiety is ok. You are now the manager of your health.  Keep up the good work!      HPI  Time/date of injury: 4/9/17  Mechanism: fall from syncope 2nd to dehydration.    Azalea has been seen weekly as well as multiple telephone contact made since her initial presentation.  Her condition worsened for the first few weeks and has started to improve since Ms Small has joined her care team.  She has not been to PT/OT, as this did not seem indicated based on her assessments.      She presents today  Excited and happy  Does calm cristhian, takes pills and falls asleep quickly.  Gets up at night but goes back to sleep fairly easily.  Most nights sleeps 6-7 hours per night. Eating better, gained a couple pounds.    At the beginning of the year was 162 lbs so improving.  [place], babys[place].  Having a few more headaches, start in back and wrap around.      August 7th have therapy appoitment scheduled  Still very anxeious with any change or adventure.   Anticipation much worse than the event.  Wants to know       Current medications:  Reconciled in chart today by clinic staff and reviewed by me.    REVIEW OF SYSTEMS:  Refer to DocFlowsheets:  Concussion symptoms  And HPI    OBJECTIVE:   LMP 06/28/2001    Wt Readings from Last 4  Encounters:   06/14/17 142 lb 6.4 oz   06/02/17 147 lb 8 oz   05/23/17 150 lb   05/11/17 151 lb 12.8 oz       EXAM:  GENERAL: alert, oriented to person, place, time.   PSYCHIATRIC: affect/mood normal, cooperative, normal judgement/insight and memory intact.  Anxious appearing.  Relaxed appearing   Neuro:  Alert and oriented  Strength 5/5 extremities    Neurologic/Visual:  TOAN: yes  EOMI: yes  Nystagmus: no  Painful eye movements: no  Convergence testing: Normal (</= 6 cm)    Coordination:       - Finger to Nose: normal       - Rapid Alternating Movements: normal    Balance Testing:       - Romberg: normal         Cognitive:  Immediate object recall: 4/4  Recall 4 objects at 5 minutes:3/4  Reverse months of the year: Yes    Spell world backwards: Yes   Backwards number string:  Yes         Time spent in one-on-one evaluation and discussion with patient regarding nature of problem, course, prior treatments, and therapeutic options, 75% of this 45 minute visit  was spent in counseling including this patients personal symptom triggers and education thereof.

## 2017-07-05 NOTE — PATIENT INSTRUCTIONS
You are doing GREAT!!  Keep up with all the wonderful things you are doing for yourself.      --Continue all your meds as you are taking them now.  Any medication changes will come from Kim  -- Sleeping 6 hours a night is EXCELLENT!  -- If you get a headache, try some tylenol.  -- Keep up with your exercise and your socialization.  Friends are an important part of recovery.  -- Keep doing your Calm Cristhian.  It is working.\  -- Enjoy your time with Lb, he seems like a true blessing.  -- Not quite normal is OK.  Compare how you are feeling right now with how you felt 2 months ago.  You are a !  And improving rapidly!.    Call, or MyChart me any time you need to.  Know it may take me a couple days to get back to you.    You do not HAVE to see me any more, you are welcome to follow up with me if you feel it would help you.   Continue seeing Kim as long as directed by her.    I am excited you found a therapist.  Keep that appointment.        It has been a pleasure working with you.  Know that you are well and some anxiety is ok. You are now the manager of your health.  Keep up the good work!

## 2017-07-06 PROBLEM — S06.0X9A: Chronic | Status: ACTIVE | Noted: 2017-04-12

## 2017-07-06 PROBLEM — S06.0X0A CONCUSSION WITHOUT LOSS OF CONSCIOUSNESS: Chronic | Status: ACTIVE | Noted: 2017-04-12

## 2017-07-06 ASSESSMENT — PATIENT HEALTH QUESTIONNAIRE - PHQ9: SUM OF ALL RESPONSES TO PHQ QUESTIONS 1-9: 4

## 2017-07-13 ENCOUNTER — ALLIED HEALTH/NURSE VISIT (OUTPATIENT)
Dept: FAMILY MEDICINE | Facility: CLINIC | Age: 62
End: 2017-07-13

## 2017-07-13 ENCOUNTER — TELEPHONE (OUTPATIENT)
Dept: FAMILY MEDICINE | Facility: CLINIC | Age: 62
End: 2017-07-13

## 2017-07-13 VITALS — SYSTOLIC BLOOD PRESSURE: 126 MMHG | DIASTOLIC BLOOD PRESSURE: 64 MMHG

## 2017-07-13 DIAGNOSIS — I10 ESSENTIAL HYPERTENSION WITH GOAL BLOOD PRESSURE LESS THAN 140/90: ICD-10-CM

## 2017-07-13 DIAGNOSIS — I10 ESSENTIAL HYPERTENSION WITH GOAL BLOOD PRESSURE LESS THAN 140/90: Primary | ICD-10-CM

## 2017-07-13 PROCEDURE — 99207 ZZC NO CHARGE NURSE ONLY: CPT | Performed by: FAMILY MEDICINE

## 2017-07-13 NOTE — TELEPHONE ENCOUNTER
Pt has reduced her lisinopril dose to 5 mg on her own.  She was feeling dizzy on 10 mg.  Her last BP have been at goal at the 5 mg dose.  Pt is requesting a fill of   Lisinopril 5 mg daily.  We will continue to monitor her BP at the pharmacy    Thank you,  Pallavi Bustos [facility], Mgr [facility] [place] [phone]

## 2017-07-13 NOTE — MR AVS SNAPSHOT
After Visit Summary   7/13/2017    Jolynn Haji    MRN: [id number]           Patient Information     Date Of Birth          1955        Visit Information        Provider Department      7/13/2017 2:55 PM Helga Ibarra MD [facility]        Today's Diagnoses     Essential hypertension with goal blood pressure less than 140/90    -  1       Follow-ups after your visit        Your next 10 appointments already scheduled     Jul 25, 2017  8:45 AM CDT   Adult Med Follow UP with FORTINO Reaves CNS   Psychiatry Clinic ([facility])    [address]  [address] [facility] [address]   [phone]            Aug 07, 2017 10:00 AM CDT   New Visit with Ashley Redmond PsyD   [facility]    [address] [facility] [address] [phone]              Who to contact     If you have questions or need follow up information about today's clinic visit or your schedule please contact [facility] directly at [phone].  Normal or non-critical lab and imaging results will be communicated to you by MyChart, letter or phone within 4 business days after the clinic has received the results. If you do not hear from us within 7 days, please contact the clinic through [employer] or phone. If you have a critical or abnormal lab result, we will notify you by phone as soon as possible.  Submit refill requests through [employer] or call your pharmacy and they will forward the refill request to us. Please allow 3 business days for your refill to be completed.          Additional Information About Your Visit        MyChart Information     [employer] gives you secure access to your electronic health record. If you see a primary care provider, you can also send messages to your care team and make appointments. If you have questions, please call your primary care clinic.  If  you do not have a primary care provider, please call [phone] and they will assist you.        Care EveryWhere ID     This is your Care EveryWhere ID. This could be used by other organizations to access your [facility] medical records  [id number]        Your Vitals Were     Last Period                   06/28/2001            Blood Pressure from Last 3 Encounters:   07/13/17 126/64   07/05/17 120/79   06/29/17 134/78    Weight from Last 3 Encounters:   07/05/17 144 lb 9.6 oz (65.6 kg)   06/02/17 147 lb 8 oz (66.9 kg)   05/11/17 151 lb 12.8 oz (68.9 kg)              Today, you had the following     No orders found for display       Primary Care Provider Office Phone # Fax #    Helga Ibarra [phone]       [facility] [address] [facility] [address]        Equal Access to Services     CANDI AVILA : Hadii aad ku hadasho Soomaali, waaxda luqadaha, qaybta kaalmada adeegyada, waxay idiin hayaan kaden kheugenio herrera . So [facility] [phone].    ATENCIÓN: Si shamekala español, tiene a christensen disposición servicios gratuitos de asistencia lingüística. Llame al [phone].    We comply with applicable federal civil rights laws and [place] laws. We do not discriminate on the basis of race, color, national origin, age, disability sex, sexual orientation or gender identity.            Thank you!     Thank you for choosing [facility]  for your care. Our goal is always to provide you with excellent care. Hearing back from our patients is one way we can continue to improve our services. Please take a few minutes to complete the written survey that you may receive in the mail after your visit with us. Thank you!             Your Updated Medication List - Protect others around you: Learn how to safely use, store and throw away your medicines at www.disposemymeds.org.          This list is accurate as of: 7/13/17  3:05 PM.  Always use your most recent med list.                    Brand Name Dispense Instructions for use Diagnosis    acetaminophen 500 MG tablet    TYLENOL    100 tablet    Take 2 tablets (1,000 mg) by mouth every 6 hours as needed for mild pain    Concussion, with loss of consciousness of 30 minutes or less, initial encounter       albuterol 108 (90 BASE) MCG/ACT Inhaler    PROAIR HFA/PROVENTIL HFA/VENTOLIN HFA    1 Inhaler    Inhale 2 puffs into the lungs every 6 hours as needed for shortness of breath / dyspnea    Cough       docusate sodium 100 MG tablet    COLACE    60 tablet    Take 200 mg by mouth At Bedtime        escitalopram 10 MG tablet    LEXAPRO    30 tablet    Take 1 tablet (10 mg) by mouth daily    TIFFANY (generalized anxiety disorder)       fluticasone 110 MCG/ACT Inhaler    FLOVENT HFA    1 Inhaler    Inhale 2 puffs into the lungs 2 times daily 2 puffs    Cough       fluticasone 50 MCG/ACT spray    FLONASE    1 Bottle    Spray 1-2 sprays into both nostrils daily    Acute recurrent maxillary sinusitis       lisinopril 5 MG tablet    PRINIVIL/ZESTRIL    60 tablet    Take 2 tablets (10 mg) by mouth daily    Essential hypertension with goal blood pressure less than 140/90       loratadine 10 MG capsule     30 capsule    Take 10 mg by mouth daily    Seasonal allergic rhinitis       LORazepam 1 MG tablet    ATIVAN    20 tablet    Take 1-2 tablets (1-2 mg) by mouth At Bedtime    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       metroNIDAZOLE 0.75 % cream    METROCREAM    45 g    APPLY TOPICALLY TWO TIMES A DAY TO FACE    Rosacea       mirtazapine 30 MG tablet    REMERON    30 tablet    Take 1 tablet (30 mg) by mouth At Bedtime    TIFFANY (generalized anxiety disorder), Adjustment disorder with mixed anxiety and depressed mood       naproxen sodium 220 MG tablet    ANAPROX    60 tablet    Take 1-2 tablets twice a day as needed for headache    Concussion, with loss of consciousness of 30 minutes or less, initial encounter       ondansetron 4 MG ODT tab    ZOFRAN ODT    20  tablet    Take 1-2 tablets (4-8 mg) by mouth every 8 hours as needed for nausea    Dehydration, Concussion, with loss of consciousness of 30 minutes or less, subsequent encounter       QUEtiapine 25 MG tablet    SEROQUEL    60 tablet    Take 2 tablets (50 mg) by mouth At Bedtime    Anxiety disorder, unspecified type       sennosides 8.6 MG tablet    SENOKOT     Take 1 tablet by mouth nightly as needed for constipation

## 2017-07-13 NOTE — NURSING NOTE
"Jolynn Haji is enrolled/participating in the retail pharmacy Blood Pressure Goals Achievement Program (BPGAP).  Jolynn Haji was evaluated at [facility] on July 13, 2017 at which time her blood pressure was:    BP Readings from Last 3 Encounters:   07/13/17 126/64   07/05/17 120/79   06/29/17 134/78     Reviewed lifestyle modifications for blood pressure control and reduction: including making healthy food choices, managing weight, getting regular exercise, smoking cessation, reducing alcohol consumption, monitoring blood pressure regularly.     Jolynn Haji is not experiencing symptoms.    Follow-Up: BP is at goal of < 140/90mmHg (patient 18+ years of age with or without diabetes).  Recommended follow-up at pharmacy in 6 months.     Recommendation to Provider: Pt has been splitting 10 mg tabs for \"at least 2-3 weeks\"  She is feeling less dizzy and last 3 bp checks have been at goal.  She is requesting dose be reduced to 5 mg and new Rx for 5 mg be sent to [facility].  We will continue to monitor prn.    Jolynn Haji was evaluated for enrollment into the PGEN study today.    Patient eligible for enrollment:  No  Patient interested in enrollment:  No    Completed by: Thank you,  Pallavi Bustos RPh, Mgr [facility] [place] [phone]      "

## 2017-07-14 RX ORDER — LISINOPRIL 5 MG/1
5 TABLET ORAL DAILY
Qty: 90 TABLET | Refills: 1 | Status: SHIPPED | OUTPATIENT
Start: 2017-07-14 | End: 2017-09-14

## 2017-07-14 NOTE — TELEPHONE ENCOUNTER
Definitely ok to go back down to the 5mg lisinopril tabs as she's been at that dose  for at least 2-3 weeks per her report to pharmacy. New rx for that dose sent to pharmacy. Please inform patient.     BP Readings from Last 6 Encounters:   07/13/17 126/64   07/05/17 120/79   06/29/17 134/78   06/19/17 100/54   06/14/17 126/82   06/08/17 132/78

## 2017-07-25 ENCOUNTER — OFFICE VISIT (OUTPATIENT)
Dept: PSYCHIATRY | Facility: CLINIC | Age: 62
End: 2017-07-25
Attending: CLINICAL NURSE SPECIALIST
Payer: COMMERCIAL

## 2017-07-25 VITALS
HEART RATE: 93 BPM | DIASTOLIC BLOOD PRESSURE: 78 MMHG | SYSTOLIC BLOOD PRESSURE: 125 MMHG | BODY MASS INDEX: 21.94 KG/M2 | WEIGHT: 146.4 LBS

## 2017-07-25 DIAGNOSIS — F43.23 ADJUSTMENT DISORDER WITH MIXED ANXIETY AND DEPRESSED MOOD: Primary | ICD-10-CM

## 2017-07-25 DIAGNOSIS — G47.00 INSOMNIA, UNSPECIFIED TYPE: ICD-10-CM

## 2017-07-25 DIAGNOSIS — F41.1 GAD (GENERALIZED ANXIETY DISORDER): ICD-10-CM

## 2017-07-25 PROCEDURE — 99212 OFFICE O/P EST SF 10 MIN: CPT | Mod: ZF

## 2017-07-25 NOTE — PROGRESS NOTES
Outpatient Psychiatry Progress Note     Provider: FORTINO Reaves CNS  Date: 2017  Service:  Medication follow up with counseling.   Patient Identification: Jolynn Haji  : 1955   MRN: [id number]    Jolynn Haji is a 61 year old year old female who presents for ongoing psychiatric care.  Jolynn Haji was last seen in clinic on 17.   At that time,   Assessment & Plan       Jolynn Haji is seen today for follow up and reports she thinks she continues to decompensate with concerns about memory, cognition, weight loss due to decrease in appetite.  Azalea was accompanied by her sister and her . This was helpful to gain information about functioning prior to the head injury.  Although Azalea continues to have great difficulty with anxiety she has only been on Lexapro 10 mg for 2 weeks and Mirtazapine 15 mg for about 12 days.  I reviewed with patient and her family that at this point with these medications it is normal that there is only mild improvement and maybe periods of increased anxiety. I reviewed improvements I have noticed- side effects of medication have subsided except for constipation, she is sleeping better. She is able to engage in activities once they have started.   We did some work on changing her thoughts from negative to positive. For example - she is anxious that her family is not always available to her and that she is a burden to them.  In reality her family will be there for her if she needs them and she can easily contact them. Her family feels privileged, as she would feel toward them, to be able to help her through a difficult time.  Instead of fear that she will run out of medication if she doesn't have at least 10 pills left, to know that she will and has in the past been able to get medications so that she does not run out at the last minute.   Discussed thinking in terms of thought stopping when she thinks of things with negative outcome and changing to a  positive and realistic outcome. Acknowledged that this is a difficult task for her but will become easier as she works at it.     Diagnosis  Axis 1: TIFFANY, Adjustment disorder with mixed anxiety and depression  Axis 2: none  Axis 3: See problem list in the medical record     Plan:  Medication: Increase Mirtazapine to 30 mg, continue Lexapro 10 mg and Seroquel 50 mg  OTC Recommendations: none  Lab Orders:  Consider TSH,Free ST.  Referrals: Ashley Amor or other therapist at [facility]  Release of Information: none  Future Treatment Considerations:per response to increase in Mirtazapine. May consider further increase in Lexapro and or increase or decrease in Seroquel.  Return for Follow Up:4 weeks      ____________________________________________________________________________________________________________________________________________    07/25/2017  Today Jolynn is seen with her .  She reports her mood has improved a lot. Pt has been journaling on her achievements. She has been going out with friends and shopping. Driving driving since July 6th. Anxiety is still high when she is on a time schedule and doing new things or anticipating things. She has always had these things but is escalated after head injury.  Also able to cry again which she had not been able to do since April. Still does calm lorena regularly and find it very helpful.  Also saw concussion clinic on 7/5/2017 and was told she no longer needs to follow up with them unless problem occur as she seemed to improve significantly.    Side effects of medication include: maybe headaches that happen everyday, constipation with Seroquel but using stool softeners. Maybe hand tremors. Had shaking in left pinky before medication started.   Psychiatric Review of Systems:  The patient endorses symptoms of depression: In the last 2 weeks per PHQ 9 denies  She  patient endorses symptoms of anxiety : though improving, still high when on a time schedule  or doing new things.  She endorses symptoms of reginald including none.    She endorses symptoms of psychosis including no psychotic symptoms.       Review of Medical Systems:  Sleep: 7 hours a night  Energy: improving  Concentration: improving  Appetite: improved, GI is not as bad  GI Concerns: some constipation  Cardiac concerns: denies  Neurological concerns: no new concerns  Other medical concerns: no new concerns    Current Substance Use:  Alcohol:denies  Other drugs:denies  Caffeine:denies  Nicotine: denies    Past Medical History:   Past Medical History:   Diagnosis Date     Abnormal glandular Papanicolaou smear of cervix      Abnormal glandular Papanicolaou smear of cervix- ASCUS in 2000 12/5/2003    ASC H- 2000     Basal cell carcinoma      Basal cell carcinoma of leg, right 5/2016     Hidradenitis     left  groin     HSIL (high grade squamous intraepithelial lesion) on Pap smear of cervix 7/5/2000    Normal paps from 2001 to 2016/cc     Hypertension goal BP (blood pressure) < 140/90      OA (osteoarthritis) of knee     moderate     Postmenopausal since 7/2008      Shingles 7/23/2010    left      Synovial cyst of popliteal space      Patient Active Problem List   Diagnosis     Essential hypertension with goal blood pressure less than 140/90     Hidradenitis     Localized osteoarthritis of hand     Synovial cyst of popliteal space     DERMATOPHYTOSIS OF NAIL- toenails      Lipidosis     Osteopenia     Postmenopausal     NUMBNESS AND TINGLING OF FOOT - bilateral -mild      CARDIOVASCULAR SCREENING; LDL GOAL LESS THAN 160     Numbness of feet- distal feet R>L      Advanced directives, counseling/discussion     Stress incontinence, female - mild     Cough     Acute bronchitis- recurrent- ? related to mold in school building- pt has since retired and no further bronchitis     Rosacea     History of migraine headaches- assoc. with nausea/vomiting - resolved around menopause - were  premenstrual      Dupuytren's  disease of palm - right 4th digit flexor tendon     Sun-damaged skin     Seasonal allergic rhinitis     Basal cell carcinoma of leg, right     Concussion without loss of consciousness     Adjustment disorder with mixed anxiety and depressed mood- mild at this time     Family history of celiac disease     TIFFANY (generalized anxiety disorder)     Insomnia, unspecified type       Allergies:   Allergies   Allergen Reactions     Augmentin Itching     itching, rash     Fosamax Diarrhea     Diarrhea, stomach cramps, jaw pain           Current Medications     Current Outpatient Prescriptions Ordered in [facility]   Medication Sig Dispense Refill     lisinopril (PRINIVIL/ZESTRIL) 5 MG tablet Take 1 tablet (5 mg) by mouth daily 90 tablet 1     docusate sodium (COLACE) 100 MG tablet Take 200 mg by mouth At Bedtime 60 tablet 1     sennosides (SENOKOT) 8.6 MG tablet Take 1 tablet by mouth nightly as needed for constipation       escitalopram (LEXAPRO) 10 MG tablet Take 1 tablet (10 mg) by mouth daily 30 tablet 5     mirtazapine (REMERON) 30 MG tablet Take 1 tablet (30 mg) by mouth At Bedtime 30 tablet 5     QUEtiapine (SEROQUEL) 25 MG tablet Take 2 tablets (50 mg) by mouth At Bedtime 60 tablet 5     LORazepam (ATIVAN) 1 MG tablet Take 1-2 tablets (1-2 mg) by mouth At Bedtime 20 tablet 0     acetaminophen (TYLENOL) 500 MG tablet Take 2 tablets (1,000 mg) by mouth every 6 hours as needed for mild pain 100 tablet 0     naproxen sodium (ANAPROX) 220 MG tablet Take 1-2 tablets twice a day as needed for headache 60 tablet      ondansetron (ZOFRAN ODT) 4 MG ODT tab Take 1-2 tablets (4-8 mg) by mouth every 8 hours as needed for nausea 20 tablet 1     fluticasone (FLONASE) 50 MCG/ACT spray Spray 1-2 sprays into both nostrils daily 1 Bottle 11     loratadine 10 MG capsule Take 10 mg by mouth daily 30 capsule      albuterol (PROAIR HFA, PROVENTIL HFA, VENTOLIN HFA) 108 (90 BASE) MCG/ACT inhaler Inhale 2 puffs into the lungs every 6 hours as needed  "for shortness of breath / dyspnea 1 Inhaler 5     fluticasone (FLOVENT HFA) 110 MCG/ACT inhaler Inhale 2 puffs into the lungs 2 times daily 2 puffs 1 Inhaler 12     metroNIDAZOLE (METROCREAM) 0.75 % cream APPLY TOPICALLY TWO TIMES A DAY TO FACE 45 g 5     No current Epic-ordered facility-administered medications on file.         Mental Status Exam     Appearance:  Casually dressed and Adequately groomed  Behavior/relationship to examiner/demeanor:  Cooperative, Engaged, pleasant, but nervous at times  Orientation: Oriented to person, place, time and situation  Psychomotor: mild bilateral hand tremor  Speech Rate:  Normal  Speech Spontaneity:  Normal  Mood:  \"okay\"  Affect:  Occasional Anxious/Nervous, mostly mood congruent  Thought Process (Associations): less perseverative  Thought Content:  no overt psychosis, denies suicidal ideation, intent or thoughts and patient does not appear to be responding to internal stimuli  Abnormal Perception:  Though improved, some unrealistic thought of anxiety returning  Attention/Concentration:  Fair  Language:  Intact  Insight:  Adequate  Judgment:  Fair      Results     Vital signs: /78  Pulse 93  Wt 66.4 kg (146 lb 6.4 oz)  LMP 06/28/2001  BMI 21.94 kg/m2    Laboratory Data:  no new data available    Assessment & Plan      Jolynn Haji is seen today for follow up and reports significant improvement in her anxiety and sleep though she still occasionally perseverate on return of anxiety symptoms and not continuing improvement.  Pt is also asking what would be the plan of staying on medications.  Discussed with patient and her spouse that usually recommendation is to stay on the medication at least 6 months to 1 year.  Also discussed realistic tolerance development with anxiety through psychotherapy.    Diagnosis  Axis 1: TIFFANY, Adjustment disorder with mixed anxiety and depression  Axis 2: none  Axis 3: See problem list in the medical record    Plan:  Medication: Continue " current medications  OTC Recommendations: none  Lab Orders:  none  Referrals: has appointment with Ashleychristopher Amor 8/7/17.    Release of Information: none  Future Treatment Considerations:per symptoms  Return for Follow Up:3 months   The risks, benefits, alternatives and side effects have been discussed and are understood by the patient. The patient understands the risks of using street drugs or alcohol. There are no medical contraindications, the patient agrees to treatment, and has the capacity to do so. The patient understands to call 911 or come to the nearest ED if life threatening or urgent symptoms present.  Over 50% of this time was spent counseling the patient and/or coordinating care regarding review of social and occupational functioning.  In addition patient was counseled on health and wellness practices to augment medication treatment of symptoms. See note for details.      Jaycee Russo, Psychiatric/Mental Health Nurse Practitioner Student, acting as a scribe for FORTINO Reaves.     I , Kim Brantley, personally performed the entire clinical encounter as documented by FORTINO Tipton 7/25/2017

## 2017-07-25 NOTE — MR AVS SNAPSHOT
After Visit Summary   7/25/2017    Jolynn Haji    MRN: [id number]           Patient Information     Date Of Birth          1955        Visit Information        Provider Department      7/25/2017 8:45 AM Kim Brantley APRN [facility]        Today's Diagnoses     Adjustment disorder with mixed anxiety and depressed mood- mild at this time    -  1    TIFFANY (generalized anxiety disorder)        Insomnia, unspecified type          Care Instructions    Thank you for coming to the PSYCHIATRY CLINIC.    Lab Testing:  If you had lab testing today and your results are reassuring or normal they will be mailed to you or sent through [facility] within 7 days.   If the lab tests need quick action we will call you with the results.  The phone number we will call with results is # [phone] (home) . If this is not the best number please call our clinic and change the number.    Medication Refills:  If you need any refills please call your pharmacy and they will contact us. Our fax number for refills is [phone]. Please allow three business for refill processing.   If you need to  your refill at a new pharmacy, please contact the new pharmacy directly. The new pharmacy will help you get your medications transferred.     Scheduling:  If you have any concerns about today's visit or wish to schedule another appointment please call our office during normal business hours [phone] (8-5:00 M-F)    Contact Us:  Please call [phone] during business hours (8-5:00 M-F).  If after clinic hours, or on the weekend, please call  [phone].    Financial Assistance [phone]  [facility] [facility] [employer] [facility] [phone]  [facility] [phone]  Medical Records [phone]      MENTAL HEALTH CRISIS NUMBERS:  [facility]:   [facility] - [phone]   Crisis Residence [facility] - [facility] - [phone]   Walk-In Counseling Center [facility] - [phone]   COPE 24/7  [facility] Mobile Team for Adults - [[phone]]; Child - [[phone]]     Crisis Connection - [phone]     [facility] - [phone]   Walk-in counseling [facility] - [phone]   Walk-in counseling [facility] - [phone]   Crisis Residence [facility] - [phone]   Urgent Care Adult Mental Health:   --Drop-in, 24/7 crisis line, and [facility] [place] Mobile Team [[phone]]    CRISIS TEXT LINE: Text 512-847 from anywhere, anytime, any crisis 24/7;    OR SEE www.crisistextline.org     Poison Control Center - [phone]    CHILD: [facility] needs assessment team - [phone]     [facility] Lifeline - [phone]; or [employer] Lifeline - [phone]    If you have a medical emergency please call 911or go to the nearest ER.                    _____________________________________________    Again thank you for choosing PSYCHIATRY CLINIC and please let us know how we can best partner with you to improve you and your family's health.  You may be receiving a survey in the mail regarding this appointment. We would love to have your feedback, both positive and negative, so please fill out the survey and return it using the provided envolpe. The survey is done by an external company, so your answers are anonymous.             Follow-ups after your visit        Follow-up notes from your care team     Return in about 3 months (around 10/25/2017).      Your next 10 appointments already scheduled     Aug 07, 2017 10:00 AM CDT   New Visit with Ashley Redmond PsyD   [facility] [place] ([facility] [place])    [address]  [place] [address]   [phone]            Sep 14, 2017  9:00 AM CDT   PHYSICAL with Helga Ibarra MD   [facility]    [address] [id number] [phone]            Oct 25,  2017  9:15 AM CDT   Adult Med Follow UP with FORTINO Reaves CNS   Psychiatry Clinic ([facility])    [address]  [address] [facility] [id number] [phone]              Who to contact     Please call your clinic at [phone] to:    Ask questions about your health    Make or cancel appointments    Discuss your medicines    Learn about your test results    Speak to your doctor   If you have compliments or concerns about an experience at your clinic, or if you wish to file a complaint, please contact [facility] Patient Relations at [phone] or email us at [email or web address]         Additional Information About Your Visit        [email or web address] Information     [employer] gives you secure access to your electronic health record. If you see a primary care provider, you can also send messages to your care team and make appointments. If you have questions, please call your primary care clinic.  If you do not have a primary care provider, please call [phone] and they will assist you.      [employer] is an electronic gateway that provides easy, online access to your medical records. With [employer], you can request a clinic appointment, read your test results, renew a prescription or communicate with your care team.     To access your existing account, please contact your [facility] or call [phone] for assistance.        Care EveryWhere ID     This is your Care EveryWhere ID. This could be used by other organizations to access your [facility] medical records  [id number]        Your Vitals Were     Pulse Last Period BMI (Body Mass Index)             93 06/28/2001 21.94 kg/m2          Blood Pressure from Last 3 Encounters:   07/25/17 125/78   07/13/17 126/64   07/05/17 120/79    Weight from Last 3 Encounters:   07/25/17 66.4 kg (146 lb 6.4 oz)   07/05/17 65.6 kg (144 lb 9.6 oz)   06/14/17 64.6 kg  (142 lb 6.4 oz)              Today, you had the following     No orders found for display       Primary Care Provider Office Phone # Fax #    Helga Ibarra [phone]       [facility] [address] [place] [address]        Equal Access to Services     CANDI AVILA : Hadii aad ku hadasho Soomaali, waaxda luqadaha, qaybta kaalmada adeegyada, waxay idiin hayaan adeeg juan joseji lajaguar . So [facility] [phone].    ATENCIÓN: Si habla español, tiene a christensen disposición servicios gratuitos de asistencia lingüística. [facility] [phone].    We comply with applicable federal civil rights laws and [place] laws. We do not discriminate on the basis of race, color, national origin, age, disability sex, sexual orientation or gender identity.            Thank you!     Thank you for choosing PS[facility]INIC  for your care. Our goal is always to provide you with excellent care. Hearing back from our patients is one way we can continue to improve our services. Please take a few minutes to complete the written survey that you may receive in the mail after your visit with us. Thank you!             Your Updated Medication List - Protect others around you: Learn how to safely use, store and throw away your medicines at www.disposemymeds.org.          This list is accurate as of: 7/25/17  9:38 AM.  Always use your most recent med list.                   Brand Name Dispense Instructions for use Diagnosis    acetaminophen 500 MG tablet    TYLENOL    100 tablet    Take 2 tablets (1,000 mg) by mouth every 6 hours as needed for mild pain    Concussion, with loss of consciousness of 30 minutes or less, initial encounter       albuterol 108 (90 BASE) MCG/ACT Inhaler    PROAIR HFA/PROVENTIL HFA/VENTOLIN HFA    1 Inhaler    Inhale 2 puffs into the lungs every 6 hours as needed for shortness of breath / dyspnea    Cough       docusate sodium 100 MG tablet    COLACE    60 tablet    Take 200 mg by  mouth At Bedtime        escitalopram 10 MG tablet    LEXAPRO    30 tablet    Take 1 tablet (10 mg) by mouth daily    TIFFAYN (generalized anxiety disorder)       fluticasone 110 MCG/ACT Inhaler    FLOVENT HFA    1 Inhaler    Inhale 2 puffs into the lungs 2 times daily 2 puffs    Cough       fluticasone 50 MCG/ACT spray    FLONASE    1 Bottle    Spray 1-2 sprays into both nostrils daily    Acute recurrent maxillary sinusitis       lisinopril 5 MG tablet    PRINIVIL/ZESTRIL    90 tablet    Take 1 tablet (5 mg) by mouth daily    Essential hypertension with goal blood pressure less than 140/90       loratadine 10 MG capsule     30 capsule    Take 10 mg by mouth daily    Seasonal allergic rhinitis       LORazepam 1 MG tablet    ATIVAN    20 tablet    Take 1-2 tablets (1-2 mg) by mouth At Bedtime    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       metroNIDAZOLE 0.75 % cream    METROCREAM    45 g    APPLY TOPICALLY TWO TIMES A DAY TO FACE    Rosacea       mirtazapine 30 MG tablet    REMERON    30 tablet    Take 1 tablet (30 mg) by mouth At Bedtime    TIFFANY (generalized anxiety disorder), Adjustment disorder with mixed anxiety and depressed mood       naproxen sodium 220 MG tablet    ANAPROX    60 tablet    Take 1-2 tablets twice a day as needed for headache    Concussion, with loss of consciousness of 30 minutes or less, initial encounter       ondansetron 4 MG ODT tab    ZOFRAN ODT    20 tablet    Take 1-2 tablets (4-8 mg) by mouth every 8 hours as needed for nausea    Dehydration, Concussion, with loss of consciousness of 30 minutes or less, subsequent encounter       QUEtiapine 25 MG tablet    SEROQUEL    60 tablet    Take 2 tablets (50 mg) by mouth At Bedtime    Anxiety disorder, unspecified type       sennosides 8.6 MG tablet    SENOKOT     Take 1 tablet by mouth nightly as needed for constipation

## 2017-07-25 NOTE — NURSING NOTE
Chief Complaint   Patient presents with     Recheck Medication     TIFFANY    Reviewed allergies, smoking status, and pharmacy preference  Administered abuse screening questions   Obtained weight, blood pressure and heart rate

## 2017-07-25 NOTE — PATIENT INSTRUCTIONS
Thank you for coming to the PSYCHIATRY CLINIC.    Lab Testing:  If you had lab testing today and your results are reassuring or normal they will be mailed to you or sent through [employer] Thing within 7 days.   If the lab tests need quick action we will call you with the results.  The phone number we will call with results is # [phone] (home) . If this is not the best number please call our clinic and change the number.    Medication Refills:  If you need any refills please call your pharmacy and they will contact us. Our fax number for refills is [phone]. Please allow three business for refill processing.   If you need to  your refill at a new pharmacy, please contact the new pharmacy directly. The new pharmacy will help you get your medications transferred.     Scheduling:  If you have any concerns about today's visit or wish to schedule another appointment please call our office during normal business hours [phone] (8-5:00 M-F)    Contact Us:  Please call [phone] during business hours (8-5:00 M-F).  If after clinic hours, or on the weekend, please call  [phone].    Financial Assistance [phone]  [employer] Billing [phone]  [employer] Billing [phone]  Medical Records [phone]      MENTAL HEALTH CRISIS NUMBERS:  [facility]:   [facility] - [phone]   Crisis Residence [facility] - [phone]   Walk-In Counseling [facility] [phone]   COPE 24/7 [employer] Mobile Team for Adults - [[phone]]; Child - [[phone]]     Crisis Connection - [phone]     [facility]:   [facility] - [phone]   Walk-in counseling [facility] - [phone]   Walk-in counseling [facility] - [phone]   Crisis Residence [facility] - [phone]   Urgent Care Adult Mental Health:   --Drop-in, 24/7 crisis line, and [employer] Mobile Team [[phone]]    CRISIS TEXT  LINE: Text [phone] from anywhere, anytime, any crisis 24/7;    OR SEE [email or web address].org     Poison Control Center - [phone]    CHILD: [facility] needs assessment team - [phone]     [facility] - [phone]; or [employer] [facility] - [phone]    If you have a medical emergency please call 911or go to the nearest ER.                    _____________________________________________    Again thank you for choosing PSYCHIATRY CLINIC and please let us know how we can best partner with you to improve you and your family's health.  You may be receiving a survey in the mail regarding this appointment. We would love to have your feedback, both positive and negative, so please fill out the survey and return it using the provided envolpe. The survey is done by an external company, so your answers are anonymous.

## 2017-08-07 ENCOUNTER — OFFICE VISIT (OUTPATIENT)
Dept: PSYCHOLOGY | Facility: CLINIC | Age: 62
End: 2017-08-07
Payer: COMMERCIAL

## 2017-08-07 DIAGNOSIS — F43.22 ADJUSTMENT DISORDER WITH ANXIETY: Primary | ICD-10-CM

## 2017-08-07 PROCEDURE — 90791 PSYCH DIAGNOSTIC EVALUATION: CPT | Performed by: PSYCHOLOGIST

## 2017-08-07 ASSESSMENT — ANXIETY QUESTIONNAIRES
3. WORRYING TOO MUCH ABOUT DIFFERENT THINGS: NOT AT ALL
GAD7 TOTAL SCORE: 5
2. NOT BEING ABLE TO STOP OR CONTROL WORRYING: NOT AT ALL
6. BECOMING EASILY ANNOYED OR IRRITABLE: NOT AT ALL
7. FEELING AFRAID AS IF SOMETHING AWFUL MIGHT HAPPEN: NOT AT ALL
5. BEING SO RESTLESS THAT IT IS HARD TO SIT STILL: NOT AT ALL
1. FEELING NERVOUS, ANXIOUS, OR ON EDGE: NEARLY EVERY DAY
IF YOU CHECKED OFF ANY PROBLEMS ON THIS QUESTIONNAIRE, HOW DIFFICULT HAVE THESE PROBLEMS MADE IT FOR YOU TO DO YOUR WORK, TAKE CARE OF THINGS AT HOME, OR GET ALONG WITH OTHER PEOPLE: NOT DIFFICULT AT ALL

## 2017-08-07 ASSESSMENT — PATIENT HEALTH QUESTIONNAIRE - PHQ9
SUM OF ALL RESPONSES TO PHQ QUESTIONS 1-9: 1
5. POOR APPETITE OR OVEREATING: MORE THAN HALF THE DAYS

## 2017-08-07 NOTE — Clinical Note
Azalea has started counseling to learn ways to reduce anxiety and improve feeling calm and focused. Future visits are scheduled. Please contact me with questions or concerns, thank you!

## 2017-08-07 NOTE — PROGRESS NOTES
"                                                                                                                                                                        Adult Intake Structured Interview  Standard Diagnostic Assessment      CLIENT'S NAME: Jolynn Haji  MRN:   [id number]  :   1955  ACCT. NUMBER: [id number]  DATE OF SERVICE: 17      Identifying Information:  Client is a 61 year old, ,  female. Client was referred for counseling by BILL Horton. Client is currently retired. Client attended the session alone.       Client's Statement of Presenting Concern:  Client reports the reason for seeking therapy at this time as symptoms of depression and anxiety that followed a concussion in 2017. She reports she is \"about 90%\" of the way back to what she considered her normal baseline in functioning. Client reported that symptoms of depression have remitted, but symptoms of anxiety remain.  Client stated that her symptoms have resulted in the following functional impairments: childcare / parenting, management of the household and or completion of tasks and social interactions      History of Presenting Concern:  Client reports that these problem(s) began following her concussion in 2017. She reported no previous history of anxiety or depression. She reported that she was unable to sleep after she had her concussion, which generated a lot of anxiety about her health, what was \"really wrong with me,\" and whether she would ever recover fully. She acknowledged feeling very anxious about not being able to perform her usual day to day tasks after her concussion (e.g. Household tasks, babysitting her grandson, socializing, errands such as shopping, etc), and fearful that she would never recover or be \"normal\" again. She noted during interview that her psychiatrist postulated that \"I have always been anxious\" but client does not think her past anxiety was atypical nor did it " "create a functional impairment or distress. Client has attempted to resolve these concerns in the past through getting out of the house regularly, socializing regularly, using the Calm lorena on her phone, prayer/devotionals, medications, and talking to family/friends. Client reports that other professional(s) are involved in providing support / services. She is followed by Kim Brantley in psychiatry. She has been seen in the [facility], but has \"graduated\" from services there with the option to return if needed.       Social History:  Client reported she grew up in [place], [place]. They were the first born of 3 children. Parents were always . Both are . Client reported that her childhood was \"happy, both parents were supportive, nurturing, and caring\". Client described her current relationships with family of origin as \"very close\" and they spend a lot of time together.    Client reported a history of 1 committed relationships or marriages. Client has been  for several years. Client reported having 2 children. Client identified extensive stable and meaningful social connections. Client reported that she has not been involved with the legal system.  Client's highest education level was graduate school. Client did not identify any learning problems. There are no ethnic, cultural or Sikhism factors that may be relevant for therapy. Client identified her preferred language to be English. Client reported she does not need the assistance of an  or other support involved in therapy. Modifications will not be used to assist communication in therapy. Client did not serve in the .     Client reports family history includes Blood Disease in her mother; CANCER in her maternal uncle and maternal uncle; Cardiovascular in her father; DIABETES in her daughter and mother; HEART DISEASE (age of onset: 62) in her father; Hypertension in her father and mother; Thyroid Disease in her " mother.    Mental Health History:  Client reported no family history of mental health issues.  Client previously received the following mental health diagnosis: anxiety and depression following her concussion.  Client has received the following mental health services in the past: psychiatry.  Hospitalizations: None.  Client is currently receiving the following services: psychiatry.      Chemical Health History:  Client reported the following biological family members or relatives with chemical health issues: Brother reportedly used alcohol . Client has not received chemical dependency treatment in the past. Client is not currently receiving any chemical dependency treatment. Client reports no problems as a result of their drinking / drug use.      Client Reports:  Client denies using alcohol.  Client denies using tobacco.  Client denies using marijuana.  Client reports using caffeine 2 times per day and drinks 1 at a time. Client started using caffeine at age did not report.  Client denies using street drugs.  Client denies the non-medical use of prescription or over the counter drugs.    CAGE: None of the patient's responses to the CAGE screening were positive / Negative CAGE score   Based on the negative Cage-Aid score and clinical interview there  are not indications of drug or alcohol abuse.    Discussed the general effects of drugs and alcohol on health and well-being. Therapist gave client printed information about the effects of chemical use on her health and well being.      Significant Losses / Trauma / Abuse / Neglect Issues:  There are no indications or report of: significant losses, trauma, abuse or neglect.    Issues of possible neglect are not present.      Medical Issues:  Client has had a physical exam to rule out medical causes for current symptoms. Date of last physical exam was within the past year. Client was encouraged to follow up with PCP if symptoms were to develop. The client has a [facility]  "Primary Care Provider, who is named Helga Ibarra. The client has a psychiatrist whose name and location are: Kim Brantley, [facility]. Client reports no current medical concerns. The client reports the presence of chronic or episodic pain in the form of headaches. The pain level is mild and has a frequency of \"off and on\".. There are not significant nutritional concerns. Client reported that she lost weight after her concussion but her eating habits having improved.    Client reports current meds as:   Current Outpatient Prescriptions   Medication Sig     lisinopril (PRINIVIL/ZESTRIL) 5 MG tablet Take 1 tablet (5 mg) by mouth daily     docusate sodium (COLACE) 100 MG tablet Take 200 mg by mouth At Bedtime     sennosides (SENOKOT) 8.6 MG tablet Take 1 tablet by mouth nightly as needed for constipation     escitalopram (LEXAPRO) 10 MG tablet Take 1 tablet (10 mg) by mouth daily     mirtazapine (REMERON) 30 MG tablet Take 1 tablet (30 mg) by mouth At Bedtime     QUEtiapine (SEROQUEL) 25 MG tablet Take 2 tablets (50 mg) by mouth At Bedtime     LORazepam (ATIVAN) 1 MG tablet Take 1-2 tablets (1-2 mg) by mouth At Bedtime     acetaminophen (TYLENOL) 500 MG tablet Take 2 tablets (1,000 mg) by mouth every 6 hours as needed for mild pain     naproxen sodium (ANAPROX) 220 MG tablet Take 1-2 tablets twice a day as needed for headache     ondansetron (ZOFRAN ODT) 4 MG ODT tab Take 1-2 tablets (4-8 mg) by mouth every 8 hours as needed for nausea     fluticasone (FLONASE) 50 MCG/ACT spray Spray 1-2 sprays into both nostrils daily     loratadine 10 MG capsule Take 10 mg by mouth daily     albuterol (PROAIR HFA, PROVENTIL HFA, VENTOLIN HFA) 108 (90 BASE) MCG/ACT inhaler Inhale 2 puffs into the lungs every 6 hours as needed for shortness of breath / dyspnea     fluticasone (FLOVENT HFA) 110 MCG/ACT inhaler Inhale 2 puffs into the lungs 2 times daily 2 puffs     metroNIDAZOLE (METROCREAM) 0.75 % cream APPLY " TOPICALLY TWO TIMES A DAY TO FACE     No current facility-administered medications for this visit.        Client Allergies:  Allergies   Allergen Reactions     Augmentin Itching     itching, rash     Fosamax Diarrhea     Diarrhea, stomach cramps, jaw pain          Medical History:  Past Medical History:   Diagnosis Date     Abnormal glandular Papanicolaou smear of cervix      Abnormal glandular Papanicolaou smear of cervix- ASCUS in 2000 12/5/2003    ASC H- 2000     Basal cell carcinoma      Basal cell carcinoma of leg, right 5/2016     Hidradenitis     left  groin     HSIL (high grade squamous intraepithelial lesion) on Pap smear of cervix 7/5/2000    Normal paps from 2001 to 2016/cc     Hypertension goal BP (blood pressure) < 140/90      OA (osteoarthritis) of knee     moderate     Postmenopausal since 7/2008      Shingles 7/23/2010    left      Synovial cyst of popliteal space          Medication Adherence:  Client reports taking prescribed medications as prescribed. She reported that she would eventually like to discontinue her medications.     Client was provided recommendation to follow-up with prescribing physician.    Mental Status Assessment:  Appearance:   Appropriate   Eye Contact:   Good   Psychomotor Behavior: Normal   Attitude:   Cooperative  Pleasant   Orientation:   All  Speech   Rate / Production: Normal    Volume:  Normal   Mood:    Anxious   Affect:    Appropriate   Thought Content:  worry   Thought Form:  Coherent  Logical   Insight:    Good       Review of Symptoms:  Depression: Concentration  Seema:  No symptoms  Psychosis: No symptoms Auditory or Visual Hallucinations  Anxiety: Feeling anxious, Trouble relaxing  Panic:  No symptoms  Post Traumatic Stress Disorder: No symptoms  Obsessive Compulsive Disorder: No symptoms  Eating Disorder: No symptoms  Oppositional Defiant Disorder: No symptoms  ADD / ADHD: No symptoms  Conduct Disorder: No symptoms        Safety Issues and Plan for Safety and  Risk Management:  Client denies a history of suicidal ideation, suicide attempts, self-injurious behavior, homicidal ideation, homicidal behavior and and other safety concerns  Client denies current fears or concerns for personal safety.  Client denies current or recent suicidal ideation or behaviors.  Client denies current or recent homicidal ideation or behaviors.  Client denies current or recent self injurious behavior or ideation.  Client denies other safety concerns.  Client reports there are firearms in the house. The firearms are secured in a locked space.  A safety and risk management plan has not been developed at this time, however client was given the after-hours number / 911 should there be a change in any of these risk factors.    Client's Strengths and Limitations:  Client identified the following strengths or resources that will help her succeed in counseling: Denominational, commitment to health and well being, umm / spirituality, friends / good social support, family support and intelligence. Client identified the following supports: family, Church / spirituality and friends. Things that may interfere with the clients success in counseling include:none reported.        Diagnostic Criteria:  A. The development of emotional or behavioral symptoms in response to an identifiable stressor(s) occurring within 3 months of the onset of the stressor(s)  B. These symptoms or behaviors are clinically significant, as evidenced by one or both of the following:       - Marked distress that is out of proportion to the severity/intensity of the stressor (with consideration for external context & culture)  C. The stress-related disturbance does not meet criteria for another disorder & is not not an exacerbation of another mental disorder  D. The symptoms do not represent normal bereavement  E. Once the stressor or its consequences have terminated, the symptoms do not persist for more than an additional 6 months       *  Adjustment Disorder with Anxiety: The predominant manfestations are symptoms such as nervousness, worry, or jitteriness, or, in children separation anxiety from major attachment figures      Functional Status:  Client's symptoms have caused reduced functional status in the following areas: Activities of Daily Living - reduction in participation in household tasks, errands, and social outings  Social / Relational - isolation/avoidance      DSM5 Diagnoses: (Sustained by DSM5 Criteria Listed Above)  Diagnoses: Adjustment Disorders  309.24 (F43.22) With anxiety  Psychosocial & Contextual Factors: concussion 4/2017  WHODAS 2.0 (12 item)            This questionnaire asks about difficulties due to health conditions. Health conditions  include  disease or illnesses, other health problems that may be short or long lasting,  injuries, mental health or emotional problems, and problems with alcohol or drugs.                     Think back over the past 30 days and answer these questions, thinking about how much  difficulty you had doing the following activities. For each question, please [place] only  one response.    S1 Standing for long periods such as 30 minutes? None =         1   S2 Taking care of household responsibilities? None =         1   S3 Learning a new task, for example, learning how to get to a new place? None =         1   S4 How much of a problem do you have joining community activities (for example, festivals, Rastafari or other activities) in the same way as anyone else can? None =         1   S5 How much have you been emotionally affected by your health problems? Moderate =   3     In the past 30 days, how much difficulty did you have in:   S6 Concentrating on doing something for ten minutes? None =         1   S7 Walking a long distance such as a kilometer (or equivalent)? None =         1   S8 Washing your whole body? None =         1   S9 Getting dressed? None =         1   S10 Dealing with people you do  "not know? None =         1   S11 Maintaining a friendship? None =         1   S12 Your day to day work? None =         1     H1 Overall, in the past 30 days, how many days were these difficulties present? Record number of days    H2 In the past 30 days, for how many days were you totally unable to carry out your usual activities or work because of any health condition? Record number of days     H3 In the past 30 days, not counting the days that you were totally unable, for how many days did you cut back or reduce your usual activities or work because of any health condition? Record number of days      Attendance Agreement:  Client has signed Attendance Agreement:Yes      Preliminary Treatment Plan:  The client reports no currently identified Bahai, ethnic or cultural issues relevant to therapy.     services are not indicated.    Modifications to assist communication are not indicated.    The concerns identified by the client will be addressed in therapy.    Initial Treatment will focus on: Adjustment Difficulties related to: recovery from concussion.    As a preliminary treatment goal, client will develop coping/problem-solving skills to facilitate more adaptive adjustment, identify thinking patterns that increase anxiety, and learn strategies to calm herself and improve focus.    Homework for next visit: practice deep breathing at least 4 times daily; keep a journal of \"should\" statements, self-criticisms, and expectations for self, to be discussed next visit.    The focus of initial interventions will be to alleviate anxiety and teach relaxation strategies.    The client is receiving treatment / structured support from the following professional(s) / service and treatment. Collaboration will be initiated with: primary care physician and psychiatry.    Referral to another professional/service is not indicated at this time..    A Release of Information is not needed at this time.    Report to child / " adult protection services was NA.    Client will have access to their [facility]' medical record.    Ashley Redmond PsyD LP  August 7, 2017

## 2017-08-07 NOTE — MR AVS SNAPSHOT
MRN:[id number]                      After Visit Summary   8/7/2017    Jolynn Haji    MRN: [id number]           Visit Information        Provider Department      8/7/2017 10:00 AM Ashley Redmond PsyD [facility] [place] [facility] Generic      Your next 10 appointments already scheduled     Sep 05, 2017 12:00 PM CDT   Return Visit with Ashley Redmond PsyD   [facility] [place] ([facility] [place])    [address] [place], [place] [address] [facility] [address]   [phone]            Sep 14, 2017  9:00 AM CDT   PHYSICAL with Helga Ibarra MD   [facility] ([facility])    [address] [place] [id number] [phone]            Sep 25, 2017  1:00 PM CDT   Return Visit with Ashley Redmond PsyD   [facility] [place] ([facility] [place])    [address] [place], [place] [address] [facility] [address]   [phone]            Oct 09, 2017  1:00 PM CDT   Return Visit with Ashley Redmond PsyD   [facility] [place] ([facility] [place])    [address] [place] [address]   [phone]            Oct 25, 2017  9:15 AM CDT   Adult Med Follow UP with FORTINO Reaves CNS   Psychiatry Clinic ([facility])    [place] [id number]  [address] [facility] [id number] [phone]              MyChart Information     [employer] gives you secure access to your electronic health record. If you see a primary care provider, you can also send messages to your care team and make appointments. If you have questions, please call your primary care clinic.  If you do not have a primary care provider, please call [phone] and they will assist you.        Care EveryWhere ID     This is your Care EveryWhere ID. This could be used by other organizations to access your [place] medical records  [id number]        Equal Access to  Services     [facility]: Jules Bernardo, waapoorva lumaidaadaha, qaaurora kaalkalpana padilla, teri galvan. [facility] [phone].    ATENCIÓN: Si habla español, tiene a christensen disposición servicios gratuitos de asistencia lingüística. Llame al [phone].    We comply with applicable federal civil rights laws and [place] laws. We do not discriminate on the basis of race, color, national origin, age, disability sex, sexual orientation or gender identity.

## 2017-08-08 ASSESSMENT — ANXIETY QUESTIONNAIRES: GAD7 TOTAL SCORE: 5

## 2017-09-05 ENCOUNTER — OFFICE VISIT (OUTPATIENT)
Dept: PSYCHOLOGY | Facility: CLINIC | Age: 62
End: 2017-09-05
Payer: COMMERCIAL

## 2017-09-05 DIAGNOSIS — F43.22 ADJUSTMENT DISORDER WITH ANXIETY: Primary | ICD-10-CM

## 2017-09-05 PROCEDURE — 90834 PSYTX W PT 45 MINUTES: CPT | Performed by: PSYCHOLOGIST

## 2017-09-05 NOTE — MR AVS SNAPSHOT
MRN:[id number]                      After Visit Summary   9/5/2017    Jolynn Haji    MRN: [id number]           Visit Information        Provider Department      9/5/2017 12:00 PM Ashley Redmond PsyD [facility] Generic      Your next 10 appointments already scheduled     Sep 14, 2017  9:00 AM CDT   PHYSICAL with Helga Ibarra MD   [facility] ([facility])    [address] [place] [address] [phone]            Oct 09, 2017  1:00 PM CDT   Return Visit with Ashley Redmond PsyD   [facility] ([facility])    [address] [facility] [address] [phone]            Oct 25, 2017  9:15 AM CDT   Adult Med Follow UP with FORTINO Reaves CNS   Psychiatry Clinic ([facility])    [place] [id number] [facility] [address] [phone]              MyChart Information     [employer] gives you secure access to your electronic health record. If you see a primary care provider, you can also send messages to your care team and make appointments. If you have questions, please call your primary care clinic.  If you do not have a primary care provider, please call [phone] and they will assist you.        Care EveryWhere ID     This is your Care EveryWhere ID. This could be used by other organizations to access your [place] medical records  [id number]        Equal Access to Services     CANDI AVILA : Hadii aad ku hadasho Soomaali, waaxda luqadaha, qaybta kaalmada adeegyada, teri herrera . So [facility] [phone].    ATENCIÓN: Si habla español, tiene a christensen disposición servicios gratuitos de asistencia lingüística. Llame al [phone].    We comply with applicable federal civil rights laws and [place] laws. We do not discriminate on the basis of race, color, national  origin, age, disability sex, sexual orientation or gender identity.

## 2017-09-08 NOTE — PROGRESS NOTES
"                                             Progress Note    Client Name: Jolynn Haji  Date: 9/5/17         Service Type: Individual      Session Start Time: 12:00  Session End Time: 12:45      Session Length: 45     Session #: 1     Attendees: Client attended alone    Treatment Plan Last Reviewed: 9/5/17  PHQ-9 / TIFFANY-7 :      DATA      Progress Since Last Session (Related to Symptoms / Goals / Homework):   Symptoms: Improved    Homework: Achieved / completed to satisfaction      Episode of Care Goals: Satisfactory progress - ACTION (Actively working towards change); Intervened by reinforcing change plan / affirming steps taken. Client reported that she has been practicing deep breathing daily with good results. Also brought her though log to session for review.      Current / Ongoing Stressors and Concerns:   Client identified, through the use of thought logs, that anticipatory worry contributes to tension and disproportionate fear about \"never getting back to normal.\" Reported that she is feeling \"98% percent normal\" and felt proud of herself for mastering deep breathing strategies and challenging disproportionate worry thoughts. Reported that family has shared their observations that she is \"getting better.\" Acknowledged that it has been hard for her to accept the limitations that came along with recovering from her concussion because she has never experienced a major health problem before, and expected herself to recover much faster than she did. Client acknowledged feeling like a let down to others when she had to cancel plans or obligations, and had to rely more heavily on others for assistance in the weeks and months after her concussion.     Treatment Objective(s) Addressed in This Session:   identify triggers to anxiety  Reduce anxiety     Intervention:   CBT: provided psychoeducation about ways to adjust to recovery from a brain injury; assisted client in identifying and replacing anticipatory worry " "thoughts that increase anxiety and prevent participation in day to day activities  Solution Focused: coached client on relaxation and deep breathing strategies  Supportive therapy: provided active listening, support, and encouragement. Normalized the process of adjusting to the recovery period following a brain injury. Reflected on the gains client made by identifying disproportionate worry and setting more reasonable expectations for herself. Reinforced healthy behavioral choices.         ASSESSMENT: Current Emotional / Mental Status (status of significant symptoms):   Risk status (Self / Other harm or suicidal ideation)   Client denies current fears or concerns for personal safety.   Client denies current or recent suicidal ideation or behaviors.   Client denies current or recent homicidal ideation or behaviors.   Client denies current or recent self injurious behavior or ideation.   Client denies other safety concerns.   A safety and risk management plan has not been developed at this time, however client was given the after-hours number / 911 should there be a change in any of these risk factors.     Appearance:   Appropriate    Eye Contact:   Good    Psychomotor Behavior: Normal    Attitude:   Cooperative  Pleasant    Orientation:   All   Speech    Rate / Production: Normal     Volume:  Normal    Mood:    \"much better\" with some episodes of mild anxiety    Affect:    Appropriate  Bright    Thought Content:  worry    Thought Form:  Coherent  Logical    Insight:    Good      Medication Review:   No changes to current psychiatric medication(s)     Medication Compliance:   Yes     Changes in Health Issues:   None reported     Chemical Use Review:   Substance Use: Chemical use reviewed, no active concerns identified      Tobacco Use: No current tobacco use.       Collateral Reports Completed:   Not Applicable    PLAN: (Client Tasks / Therapist Tasks / Other)  Future appointments are scheduled. Continue to surface " and replace disproportionate and anticipatory worry thoughts that increase anxiety. Practice deep breathing at least 3x daily. Take breaks throughout the day to assess energy levels and give yourself permission to rest when needed.         Ashley Redmond PsyD LP                                                         ________________________________________________________________________    Treatment Plan    Client's Name: Jolynn Haji  YOB: 1955    Date: 9/5/17    DSM-V Diagnoses: Adjustment Disorders  309.24 (F43.22) With anxiety  Psychosocial / Contextual Factors: concussion 4/2017  WHODAS: 13    Referral / Collaboration:  Referral to another professional/service is not indicated at this time..    Anticipated number of session or this episode of care: reassess after 8 sessions      MeasurableTreatment Goal(s) related to diagnosis / functional impairment(s)  Goal 1: Client will learn and use strategies to manage anxiety more effectively    I will know I've met my goal when I'm back to my baseline.      Objective #A (Client Action)    Client will identify and replace thinking errors that contribute to worry and anxiety.  Status: New - Date: 9/5/17     Intervention(s)  Therapist will teach CBT skills to identify and replace worry thoughts.    Objective #B  Client will practice deep breathing strategies at least 3 times daily or as often as needed to reduce tension and worry.  Status: New - Date: 9/5/17     Intervention(s)  Therapist will teach deep breathing and relaxation strategies.    Objective #C  Client will identify warning signs of over-exertion and take breaks throughout the day to rest and relax.  Status: New - Date: 9/5/17     Intervention(s)  Therapist will assist client in identifying signs of over-exertion that lead to fatigue and discouragement.        Client has reviewed and agreed to the above plan.      Ashley Redmond PsyD LP  9/5/17

## 2017-09-14 ENCOUNTER — OFFICE VISIT (OUTPATIENT)
Dept: FAMILY MEDICINE | Facility: CLINIC | Age: 62
End: 2017-09-14
Payer: COMMERCIAL

## 2017-09-14 VITALS
WEIGHT: 150.6 LBS | BODY MASS INDEX: 22.82 KG/M2 | DIASTOLIC BLOOD PRESSURE: 80 MMHG | HEIGHT: 68 IN | OXYGEN SATURATION: 98 % | TEMPERATURE: 98.1 F | SYSTOLIC BLOOD PRESSURE: 118 MMHG | HEART RATE: 83 BPM

## 2017-09-14 DIAGNOSIS — K59.09 CHRONIC CONSTIPATION: ICD-10-CM

## 2017-09-14 DIAGNOSIS — I73.00 RAYNAUD'S PHENOMENON WITHOUT GANGRENE: ICD-10-CM

## 2017-09-14 DIAGNOSIS — Z78.0 ASYMPTOMATIC POSTMENOPAUSAL STATUS: ICD-10-CM

## 2017-09-14 DIAGNOSIS — I10 ESSENTIAL HYPERTENSION WITH GOAL BLOOD PRESSURE LESS THAN 140/90: ICD-10-CM

## 2017-09-14 DIAGNOSIS — R05.9 COUGH: ICD-10-CM

## 2017-09-14 DIAGNOSIS — S06.0X0D CONCUSSION WITHOUT LOSS OF CONSCIOUSNESS, SUBSEQUENT ENCOUNTER: Chronic | ICD-10-CM

## 2017-09-14 DIAGNOSIS — Z12.31 VISIT FOR SCREENING MAMMOGRAM: ICD-10-CM

## 2017-09-14 DIAGNOSIS — Z13.6 CARDIOVASCULAR SCREENING; LDL GOAL LESS THAN 160: ICD-10-CM

## 2017-09-14 DIAGNOSIS — Z00.01 ENCOUNTER FOR ROUTINE ADULT MEDICAL EXAM WITH ABNORMAL FINDINGS: Primary | ICD-10-CM

## 2017-09-14 DIAGNOSIS — Z23 NEED FOR PROPHYLACTIC VACCINATION AND INOCULATION AGAINST INFLUENZA: ICD-10-CM

## 2017-09-14 DIAGNOSIS — Z12.11 SCREEN FOR COLON CANCER: ICD-10-CM

## 2017-09-14 LAB
CHOLEST SERPL-MCNC: 207 MG/DL
ERYTHROCYTE [DISTWIDTH] IN BLOOD BY AUTOMATED COUNT: 13.3 % (ref 10–15)
GLUCOSE SERPL-MCNC: 89 MG/DL (ref 70–99)
HCT VFR BLD AUTO: 43 % (ref 35–47)
HDLC SERPL-MCNC: 85 MG/DL
HGB BLD-MCNC: 14.1 G/DL (ref 11.7–15.7)
LDLC SERPL CALC-MCNC: 103 MG/DL
MCH RBC QN AUTO: 30.5 PG (ref 26.5–33)
MCHC RBC AUTO-ENTMCNC: 32.8 G/DL (ref 31.5–36.5)
MCV RBC AUTO: 93 FL (ref 78–100)
NONHDLC SERPL-MCNC: 122 MG/DL
PLATELET # BLD AUTO: 236 10E9/L (ref 150–450)
RBC # BLD AUTO: 4.63 10E12/L (ref 3.8–5.2)
TRIGL SERPL-MCNC: 94 MG/DL
TSH SERPL DL<=0.005 MIU/L-ACNC: 1.26 MU/L (ref 0.4–4)
WBC # BLD AUTO: 7.3 10E9/L (ref 4–11)

## 2017-09-14 PROCEDURE — 84443 ASSAY THYROID STIM HORMONE: CPT | Performed by: FAMILY MEDICINE

## 2017-09-14 PROCEDURE — 36415 COLL VENOUS BLD VENIPUNCTURE: CPT | Performed by: FAMILY MEDICINE

## 2017-09-14 PROCEDURE — 80061 LIPID PANEL: CPT | Performed by: FAMILY MEDICINE

## 2017-09-14 PROCEDURE — 85027 COMPLETE CBC AUTOMATED: CPT | Performed by: FAMILY MEDICINE

## 2017-09-14 PROCEDURE — 99396 PREV VISIT EST AGE 40-64: CPT | Performed by: FAMILY MEDICINE

## 2017-09-14 PROCEDURE — 82947 ASSAY GLUCOSE BLOOD QUANT: CPT | Performed by: FAMILY MEDICINE

## 2017-09-14 RX ORDER — LISINOPRIL 5 MG/1
5 TABLET ORAL DAILY
Qty: 90 TABLET | Refills: 1 | Status: SHIPPED | OUTPATIENT
Start: 2017-09-14 | End: 2018-04-05

## 2017-09-14 RX ORDER — ALBUTEROL SULFATE 90 UG/1
2 AEROSOL, METERED RESPIRATORY (INHALATION) EVERY 6 HOURS PRN
Qty: 1 INHALER | Refills: 5 | Status: SHIPPED | OUTPATIENT
Start: 2017-09-14 | End: 2019-07-24

## 2017-09-14 ASSESSMENT — ANXIETY QUESTIONNAIRES
5. BEING SO RESTLESS THAT IT IS HARD TO SIT STILL: NOT AT ALL
3. WORRYING TOO MUCH ABOUT DIFFERENT THINGS: NOT AT ALL
7. FEELING AFRAID AS IF SOMETHING AWFUL MIGHT HAPPEN: NOT AT ALL
6. BECOMING EASILY ANNOYED OR IRRITABLE: NOT AT ALL
GAD7 TOTAL SCORE: 0
1. FEELING NERVOUS, ANXIOUS, OR ON EDGE: NOT AT ALL
2. NOT BEING ABLE TO STOP OR CONTROL WORRYING: NOT AT ALL

## 2017-09-14 ASSESSMENT — PATIENT HEALTH QUESTIONNAIRE - PHQ9
SUM OF ALL RESPONSES TO PHQ QUESTIONS 1-9: 0
5. POOR APPETITE OR OVEREATING: NOT AT ALL

## 2017-09-14 NOTE — NURSING NOTE
"Chief Complaint   Patient presents with     Physical       Initial /80 (BP Location: Left arm, Patient Position: Chair, Cuff Size: Adult Regular)  Pulse 83  Temp 98.1  F (36.7  C) (Oral)  Ht 5' 8.25\" (1.734 m)  Wt 150 lb 9.6 oz (68.3 kg)  LMP 06/28/2001  SpO2 98%  BMI 22.73 kg/m2 Estimated body mass index is 22.73 kg/(m^2) as calculated from the following:    Height as of this encounter: 5' 8.25\" (1.734 m).    Weight as of this encounter: 150 lb 9.6 oz (68.3 kg).  Medication Reconciliation: complete   Liane Escobedo CMA  "

## 2017-09-14 NOTE — MR AVS SNAPSHOT
After Visit Summary   9/14/2017    Jolynn Haji    MRN: [id number]           Patient Information     Date Of Birth          1955        Visit Information        Provider Department      9/14/2017 9:00 AM Helga Ibarra MD [facility] [place]        Today's Diagnoses     Encounter for routine adult medical exam with abnormal findings    -  1    Screen for colon cancer        Asymptomatic postmenopausal status        Visit for screening mammogram        Need for prophylactic vaccination and inoculation against influenza        Essential hypertension with goal blood pressure less than 140/90- well controlled today         CARDIOVASCULAR SCREENING; LDL GOAL LESS THAN 160        Essential hypertension with goal blood pressure less than 140/90 - now better controlled        Cough        Concussion without loss of consciousness, subsequent encounter        Chronic constipation        Raynaud's phenomenon without gangrene          Care Instructions       DAILY FIBER THERAPY MIX: for chronic constipation:     1/2 cup Konsyl (psyllium fiber)  from [employer] (if available - otherwise use citrucel 1/2 cup), 1 cup of oat bran, 1 cup of wheat bran and 1 cup of flax seed meal - ground - = mix it together in a container or zip-loc bag and take 1 teaspoon in 1 cup of warm water daily, , follow with 1 -8 oz of water.        Preventive Health Recommendations  Female Ages 50 - 64    Yearly exam: See your health care provider every year in order to  o Review health changes.   o Discuss preventive care.    o Review your medicines if your doctor has prescribed any.      Get a Pap test every three years (unless you have an abnormal result and your provider advises testing more often).    If you get Pap tests with HPV test, you only need to test every 5 years, unless you have an abnormal result.     You do not need a Pap test if your uterus was removed (hysterectomy) and you have not had cancer.    You  should be tested each year for STDs (sexually transmitted diseases) if you're at risk.     Have a mammogram every 1 to 2 years.    Have a colonoscopy at age 50, or have a yearly FIT test (stool test). These exams screen for colon cancer.      Have a cholesterol test every 5 years, or more often if advised.    Have a diabetes test (fasting glucose) every three years. If you are at risk for diabetes, you should have this test more often.     If you are at risk for osteoporosis (brittle bone disease), think about having a bone density scan (DEXA).    Shots: Get a flu shot each year. Get a tetanus shot every 10 years.    Nutrition:     Eat at least 5 servings of fruits and vegetables each day.    Eat whole-grain bread, whole-wheat pasta and brown rice instead of white grains and rice.    Talk to your provider about Calcium and Vitamin D.     Lifestyle    Exercise at least 150 minutes a week (30 minutes a day, 5 days a week). This will help you control your weight and prevent disease.    Limit alcohol to one drink per day.    No smoking.     Wear sunscreen to prevent skin cancer.     See your dentist every six months for an exam and cleaning.    See your eye doctor every 1 to 2 years.               Thank you for choosing [facility]  for your Health Care. It was a pleasure seeing you at your visit today. Please contact us with any questions or concerns you may have.                   Helga Ibarra MD                                  To reach your [facility] care team after hours call:   [phone]    Our clinic hours are:     Monday- 7:30 am - 7:00 pm                             Tuesday through Friday- 7:30 am - 5:00 pm                                        Saturday- 8:00 am - 12:00 pm                  Phone:  [phone]    Our pharmacy hours are:     Monday  8:00 am to 7:00 pm      Tuesday through Friday 8:00am to 6:00pm                        Saturday - 9:00 am to 1:00  pm      Sunday : Closed.              Phone:  [phone]      There is also information available at our web site:  [email or web address]    If your provider ordered any lab tests and you do not receive the results within 10 business days, please call the clinic.    If you need a medication refill please contact your pharmacy.  Please allow 2 business days for your refill to be completed.    Our clinic offers telephone visits and e visits.  Please ask one of your team members to explain more.      Use [employer] (secure email communication and access to your chart) to send your primary care provider a message or make an appointment. Ask someone on your Team how to sign up for [employer].                              Raynaud's Phenomenon  What is Raynaud's phenomenon?   Raynaud's phenomenon is a disorder of the small blood vessels that feed the skin. During an attack of Raynaud's, these blood vessels narrow briefly, limiting blood flow to the skin. The skin first turns white, then blue. Then the skin turns red as the vessels relax and blood flows again. Hands and feet are most commonly affected, but Raynaud's phenomenon can affect other areas such as the nose and ears.   Women between the ages of 15 and 50 are most often affected, but anyone can have the problem.   How does it occur?   For most people, an attack is usually triggered by exposure to cold or emotional stress. For example, reaching into a refrigerator may trigger an attack.   There are 2 forms of Raynaud's phenomenon. Most people who have Raynaud's phenomenon have the primary form (the milder version). Its cause is not known.   Secondary Raynaud's phenomenon is caused by another disease or condition and is a more serious disorder. Connective tissue diseases are the most common cause. Medical conditions that may cause secondary Raynaud's phenomenon include:   Scleroderma (a thickening and hardening of the skin and other body tissues). Raynaud's phenomenon is seen  in approximately 85 to 95% of people with scleroderma.   Systemic lupus erythematosus (a chronic inflammation of the skin and organ systems).   Rheumatoid arthritis (a chronic inflammation and swelling of tissue in the joints).   Blood flow reduction (problems that slow or stop blood flow in a vessel, such as inflammation and hardening of the arteries).   Pulmonary hypertension (high blood pressure in the lungs).   Sj?gren's syndrome (a disorder in which immune cells attack and destroy the glands that produce tears and saliva).   Polymyositis (a chronic disease that causes inflammation and weakness of the muscles).   Dermatomyositis (a form of polymyositis with skin symptoms).   Carpal tunnel syndrome (a painful disorder of the hand and wrist caused by pressure on a nerve in the wrist).   Some drugs may cause Raynaud's phenomenon. Examples of such drugs are beta blockers used to treat high blood pressure, ergotamine medicines used for migraine headaches, anticancer drugs, nonprescription cold medicines, and narcotics. Smoking can also cause Raynaud's phenomenon.   Injuries from frostbite, surgery, or some jobs may also cause Raynaud's phenomenon. Some workers in the plastics industry who are exposed to vinyl chloride develop a scleroderma-like illness and have Raynaud's phenomenon. Regular use of machinery such as chain saws and vibrating drills can hurt blood vessels.   What are the symptoms?   Symptoms include changes in skin color (white to blue to red) and skin temperature (the area feels cooler). It is common for the area to feel numb or prickly as the hands or feet warm up again and the blood flow returns.   How is it diagnosed?   Your healthcare provider will ask about your medical history and examine you. You may have blood tests. Depending on your history and exam, your provider may check for diseases or other conditions that cause secondary Raynaud's.   How is it treated?   Most healthcare providers  "recommend nondrug treatments and self-help measures first, as described below in the section on taking care of yourself.   Several kinds of medicines are sometimes used to treat severe Raynaud's symptoms. They all improve circulation. Types of drugs that might be prescribed are calcium channel blockers, alpha blockers, and vasodilators. Nitroglycerin paste, which is applied to the fingers, helps heal skin sores.   A drug may become less effective over time. Women of childbearing age should know that the medicines used to treat Raynaud's phenomenon might affect the baby. If you are pregnant or are trying to become pregnant, talk with your healthcare provider before taking these medicines.   If you are taking a medicine that seems to be causing Raynaud's phenomenon, let your healthcare provider know. You may need to change your medicine or dosage.   How long do the effects last?   Each episode of symptoms usually lasts for just a matter of minutes, but some may last more than an hour.   Raynaud's phenomenon cannot be cured, but most people are able to manage the symptoms.   How can I take care of myself?   Protect yourself from cold and keep all parts of your body warm. When you are outdoors in the winter, wear scarves, warm socks, boots, and mittens. (Mittens are better than gloves, which allow too much heat to escape.) Make sure your wrists are covered. Indoors, wear socks and comfortable shoes. When taking food out of the refrigerator or freezer, use mittens, oven mitts, or potholders. Avoid very cold air conditioning.   Guard against cuts, bruises, and other injuries to the areas affected by Raynaud's phenomenon.   If you smoke, quit. Smoking can further decrease blood flow to the fingers. In some cases smoking itself can cause Raynaud's.   You may be able to prevent or stop attacks using biofeedback, a technique in which you are taught to \"think\" your fingers or toes warm.   Control stress.   Exercise regularly, " according to your healthcare provider's recommendations.   Keep your regular follow-up appointments with your healthcare provider. See your healthcare provider sooner if you have questions or concerns.   For more information, you may wish to contact:   [employer] [place] of Arthritis and Musculoskeletal and Skin Diseases   Phone: 345-13-ZWSGQ ([phone]) (free of charge)   Web site: http://www.[facility].nih.gov               Thank you for choosing [facility]  for your Health Care. It was a pleasure seeing you at your visit today. Please contact us with any questions or concerns you may have.                   Helga Ibarra MD                                  To reach your [facility] care team after hours call:   [phone]    Our clinic hours are:     Monday- 7:30 am - 7:00 pm                             Tuesday through Friday- 7:30 am - 5:00 pm                                        Saturday- 8:00 am - 12:00 pm                  Phone:  [phone]    Our pharmacy hours are:     Monday  8:00 am to 7:00 pm      Tuesday through Friday 8:00am to 6:00pm                        Saturday - 9:00 am to 1:00 pm      Sunday : Closed.              Phone:  [phone]      There is also information available at our web site:  [email or web address]    If your provider ordered any lab tests and you do not receive the results within 10 business days, please call the clinic.    If you need a medication refill please contact your pharmacy.  Please allow 2 business days for your refill to be completed.    Our clinic offers telephone visits and e visits.  Please ask one of your team members to explain more.      Use [employer] (secure email communication and access to your chart) to send your primary care provider a message or make an appointment. Ask someone on your Team how to sign up for [employer].                       Follow-ups after your visit        Additional Services     GASTROENTEROLOGY  ADULT REF PROCEDURE ONLY       Last Lab Result: Creatinine (mg/dL)       Date                     Value                 06/19/2017               0.83             ----------  There is no height or weight on file to calculate BMI.     Needed:  No  Language:  English    Patient will be contacted to schedule procedure.     Please be aware that coverage of these services is subject to the terms and limitations of your health insurance plan.  Call member services at your health plan with any benefit or coverage questions.  Any procedures must be performed at a [place] facility OR coordinated by your clinic's referral office.    Please bring the following with you to your appointment:    (1) Any X-Rays, CTs or MRIs which have been performed.  Contact the facility where they were done to arrange for  prior to your scheduled appointment.    (2) List of current medications   (3) This referral request   (4) Any documents/labs given to you for this referral                  Your next 10 appointments already scheduled     Sep 19, 2017 11:40 AM CDT   MA SCREENING DIGITAL BILATERAL with [id number]   [facility] ([facility])    [address]  [facility] [address] [phone]           Do not use any powder, lotion or deodorant under your arms or on your breast. If you do, we will ask you to remove it before your exam.  Wear comfortable, two-piece clothing.  If you have any allergies, tell your care team.  Bring any previous mammograms from other facilities or have them mailed to the breast center. Three-dimensional (3D) mammograms are available at [place] locations in [facility], and [place]. [facility] locations include [place] [facility] in [place]. Benefits of 3D mammograms include: - Improved rate of cancer detection - Decreases your chance of having to go back for more tests, which means fewer: -  "\"False-positive\" results (This means that there is an abnormal area but it isn't cancer.) - Invasive testing procedures, such as a biopsy or surgery - Can provide clearer images of the breast if you have dense breast tissue. 3D mammography is an optional exam that anyone can have with a 2D mammogram. It doesn't replace or take the place of a 2D mammogram. 2D mammograms remain an effective screening test for all women.  Not all insurance companies cover the cost of a 3D mammogram. Check with your insurance.            Oct 09, 2017  1:00 PM CDT   Return Visit with Ashley Redmond PsyD   [facility] [place] ([facility] [place])    [address]  [place] [address] [phone]            Oct 25, 2017  9:15 AM CDT   Adult Med Follow UP with FORTINO Reaves CNS   [facility] ([facility])    [address] [facility] [address] [phone]              Future tests that were ordered for you today     Open Future Orders        Priority Expected Expires Ordered    DEXA HIP/PELVIS/SPINE - Future Routine  9/14/2018 9/14/2017    MA SCREENING DIGITAL BILAT - Future  (s+30) Routine  9/14/2018 9/14/2017    GASTROENTEROLOGY ADULT REF PROCEDURE ONLY Routine 1/3/2018 9/14/2018 9/14/2017            Who to contact     If you have questions or need follow up information about today's clinic visit or your schedule please contact [facility] directly at [phone].  Normal or non-critical lab and imaging results will be communicated to you by MyChart, letter or phone within 4 business days after the clinic has received the results. If you do not hear from us within 7 days, please contact the clinic through MyChart or phone. If you have a critical or abnormal lab result, we will notify you by phone as soon as possible.  Submit refill requests through [employer] or call your pharmacy and they will forward the " "refill request to us. Please allow 3 business days for your refill to be completed.          Additional Information About Your Visit        Cell[employer]re[employer]PT Information     [employer] gives you secure access to your electronic health record. If you see a primary care provider, you can also send messages to your care team and make appointments. If you have questions, please call your primary care clinic.  If you do not have a primary care provider, please call [phone] and they will assist you.        Care EveryWhere ID     This is your Care EveryWhere ID. This could be used by other organizations to access your [facility] medical records  [id number]        Your Vitals Were     Pulse Temperature Height Last Period Pulse Oximetry BMI (Body Mass Index)    83 98.1  F (36.7  C) (Oral) 5' 8.25\" (1.734 m) 06/28/2001 98% 22.73 kg/m2       Blood Pressure from Last 3 Encounters:   09/14/17 118/80   07/25/17 125/78   07/13/17 126/64    Weight from Last 3 Encounters:   09/14/17 150 lb 9.6 oz (68.3 kg)   07/25/17 146 lb 6.4 oz (66.4 kg)   07/05/17 144 lb 9.6 oz (65.6 kg)              We Performed the Following     CBC with platelets     Glucose     Lipid panel reflex to direct LDL     TSH with free T4 reflex          Today's Medication Changes          These changes are accurate as of: 9/14/17 10:00 AM.  If you have any questions, ask your nurse or doctor.               These medicines have changed or have updated prescriptions.        Dose/Directions    fluticasone 110 MCG/ACT Inhaler   Commonly known as:  FLOVENT HFA   This may have changed:    - when to take this  - reasons to take this  - additional instructions   Used for:  Cough        Dose:  2 puff   Inhale 2 puffs into the lungs 2 times daily 2 puffs   Quantity:  1 Inhaler   Refills:  12       fluticasone 50 MCG/ACT spray   Commonly known as:  FLONASE   This may have changed:    - when to take this  - reasons to take this   Used for:  Acute recurrent maxillary sinusitis        " Dose:  1-2 spray   Spray 1-2 sprays into both nostrils daily   Quantity:  1 Bottle   Refills:  11         Stop taking these medicines if you haven't already. Please contact your care team if you have questions.     docusate sodium 100 MG tablet   Commonly known as:  COLACE   Stopped by:  Helga Ibarra MD           loratadine 10 MG capsule   Stopped by:  Helga Ibarra MD           LORazepam 1 MG tablet   Commonly known as:  ATIVAN   Stopped by:  Helga Ibarra MD           naproxen sodium 220 MG tablet   Commonly known as:  ANAPROX   Stopped by:  Helga Ibarra MD           ondansetron 4 MG ODT tab   Commonly known as:  ZOFRAN ODT   Stopped by:  Helga Ibarra MD           sennosides 8.6 MG tablet   Commonly known as:  SENOKOT   Stopped by:  Helga Ibarra MD                Where to get your medicines      These medications were sent to [facility] [place] [id number]     Phone:  [phone]     albuterol 108 (90 BASE) MCG/ACT Inhaler    lisinopril 5 MG tablet                Primary Care Provider Office Phone # Fax #    Helga Ibarra [phone]       [address] [place] [address]        Equal Access to Services     [facility] AH: Hadii aad ku hadasho Soomaali, waaxda luqadaha, qaybta kaalmada adeegyada, teri ly haypj herrera . So [facility] [phone].    ATENCIÓN: Si habla español, tiene a christensen disposición servicios gratuitos de asistencia lingüística. Vanita al [phone].    We comply with applicable federal civil rights laws and [place] laws. We do not discriminate on the basis of race, color, national origin, age, disability sex, sexual orientation or gender identity.            Thank you!     Thank you for choosing [facility]  for your care. Our goal is always to provide you with excellent care.  Hearing back from our patients is one way we can continue to improve our services. Please take a few minutes to complete the written survey that you may receive in the mail after your visit with us. Thank you!             Your Updated Medication List - Protect others around you: Learn how to safely use, store and throw away your medicines at www.disposemymeds.org.          This list is accurate as of: 9/14/17 10:00 AM.  Always use your most recent med list.                   Brand Name Dispense Instructions for use Diagnosis    acetaminophen 500 MG tablet    TYLENOL    100 tablet    Take 2 tablets (1,000 mg) by mouth every 6 hours as needed for mild pain    Concussion, with loss of consciousness of 30 minutes or less, initial encounter       albuterol 108 (90 BASE) MCG/ACT Inhaler    PROAIR HFA/PROVENTIL HFA/VENTOLIN HFA    1 Inhaler    Inhale 2 puffs into the lungs every 6 hours as needed for shortness of breath / dyspnea    Cough       ALLEGRA ALLERGY PO      Take by mouth daily        escitalopram 10 MG tablet    LEXAPRO    30 tablet    Take 1 tablet (10 mg) by mouth daily    TIFFANY (generalized anxiety disorder)       fluticasone 110 MCG/ACT Inhaler    FLOVENT HFA    1 Inhaler    Inhale 2 puffs into the lungs 2 times daily 2 puffs    Cough       fluticasone 50 MCG/ACT spray    FLONASE    1 Bottle    Spray 1-2 sprays into both nostrils daily    Acute recurrent maxillary sinusitis       lisinopril 5 MG tablet    PRINIVIL/ZESTRIL    90 tablet    Take 1 tablet (5 mg) by mouth daily    Essential hypertension with goal blood pressure less than 140/90       metroNIDAZOLE 0.75 % cream    METROCREAM    45 g    APPLY TOPICALLY TWO TIMES A DAY TO FACE    Rosacea       mirtazapine 30 MG tablet    REMERON    30 tablet    Take 1 tablet (30 mg) by mouth At Bedtime    TIFFANY (generalized anxiety disorder), Adjustment disorder with mixed anxiety and depressed mood       QUEtiapine 25 MG tablet    SEROQUEL    60 tablet    Take 2  tablets (50 mg) by mouth At Bedtime    Anxiety disorder, unspecified type

## 2017-09-14 NOTE — PROGRESS NOTES
SUBJECTIVE:   CC: Jolynn Haji is an 62 year old woman who presents for preventive health visit.     Healthy Habits:    Do you get at least three servings of calcium containing foods daily (dairy, green leafy vegetables, etc.)? yes    Amount of exercise or daily activities, outside of work: 30 minutes per day of walking dog day and night    Problems taking medications regularly No    Medication side effects: No    Have you had an eye exam in the past two years? Yes    Do you see a dentist twice per year? Yes    Do you have sleep apnea, excessive snoring or daytime drowsiness?no      Hypertension Follow-up      Outpatient blood pressures are not being checked.    Low Salt Diet: no added salt    BP Readings from Last 5 Encounters:   09/14/17 118/80   07/13/17 126/64   07/05/17 120/79   06/29/17 134/78   06/19/17 100/54       Depression and Anxiety Follow-Up    Status since last visit: Improved - feeling so much better    Other associated symptoms:None    Complicating factors:     Significant life event: no     Current substance abuse: None    PHQ-9 SCORE 7/5/2017 8/7/2017 9/14/2017   Total Score - - -   Total Score 4 1 0   Some encounter information is confidential and restricted. Go to Review Flowsheets activity to see all data.     TIFFANY-7 SCORE 10/28/2013 8/7/2017 9/14/2017   Total Score 0 - -   Total Score - 5 0       PHQ-9  English  PHQ-9   Any Language  GAD7      Today's PHQ-2 Score:   PHQ-2 ( 1999 [employer]) 9/14/2017 5/5/2017   Q1: Little interest or pleasure in doing things 0 0   Q2: Feeling down, depressed or hopeless 0 1   PHQ-2 Score 0 1       Abuse: Current or Past(Physical, Sexual or Emotional)- No  Do you feel safe in your environment - Yes      Social History   Substance Use Topics     Smoking status: Never Smoker     Smokeless tobacco: Never Used     Alcohol use 12.0 oz/week      Comment: 1 per month     The patient does not drink >3 drinks per day nor >7 drinks per week.    Reviewed orders with patient.   Reviewed health maintenance and updated orders accordingly - Yes  BP Readings from Last 3 Encounters:   09/14/17 118/80   07/13/17 126/64   07/05/17 120/79    Wt Readings from Last 3 Encounters:   09/14/17 150 lb 9.6 oz (68.3 kg)   07/05/17 144 lb 9.6 oz (65.6 kg)   06/02/17 147 lb 8 oz (66.9 kg)                  Patient Active Problem List   Diagnosis     Essential hypertension with goal blood pressure less than 140/90     Hidradenitis     Localized osteoarthritis of hand     Synovial cyst of popliteal space     DERMATOPHYTOSIS OF NAIL- toenails      Lipidosis     Osteopenia     Postmenopausal     NUMBNESS AND TINGLING OF FOOT - bilateral -mild      CARDIOVASCULAR SCREENING; LDL GOAL LESS THAN 160     Numbness of feet- distal feet R>L      Advanced directives, counseling/discussion     Stress incontinence, female - mild     Cough     Acute bronchitis- recurrent- ? related to mold in school building- pt has since retired and no further bronchitis     Rosacea     History of migraine headaches- assoc. with nausea/vomiting - resolved around menopause - were  premenstrual      Dupuytren's disease of palm - right 4th digit flexor tendon     Sun-damaged skin     Seasonal allergic rhinitis     Basal cell carcinoma of leg, right     Concussion without loss of consciousness     Adjustment disorder with mixed anxiety and depressed mood- mild at this time     Family history of celiac disease     TIFFANY (generalized anxiety disorder)     Insomnia, unspecified type     Chronic constipation     Raynaud's phenomenon without gangrene     Past Surgical History:   Procedure Laterality Date     C NONSPECIFIC PROCEDURE  2000    Colposcopy       Social History   Substance Use Topics     Smoking status: Never Smoker     Smokeless tobacco: Never Used     Alcohol use 12.0 oz/week      Comment: 1 per month     Family History   Problem Relation Age of Onset     Thyroid Disease Mother      Graves disease     Hypertension Mother      DIABETES  Mother      type 2     Blood Disease Mother       from leukemia at age 78     DIABETES Daughter      HEART DISEASE Father 62     MV problem with CHF  at 62/ from MI     Hypertension Father      Cardiovascular Father      CANCER Maternal Uncle      type ??     CANCER Maternal Uncle      liver         Current Outpatient Prescriptions   Medication Sig Dispense Refill     Fexofenadine HCl (ALLEGRA ALLERGY PO) Take by mouth daily       lisinopril (PRINIVIL/ZESTRIL) 5 MG tablet Take 1 tablet (5 mg) by mouth daily 90 tablet 1     albuterol (PROAIR HFA/PROVENTIL HFA/VENTOLIN HFA) 108 (90 BASE) MCG/ACT Inhaler Inhale 2 puffs into the lungs every 6 hours as needed for shortness of breath / dyspnea 1 Inhaler 5     escitalopram (LEXAPRO) 10 MG tablet Take 1 tablet (10 mg) by mouth daily 30 tablet 5     mirtazapine (REMERON) 30 MG tablet Take 1 tablet (30 mg) by mouth At Bedtime 30 tablet 5     QUEtiapine (SEROQUEL) 25 MG tablet Take 2 tablets (50 mg) by mouth At Bedtime 60 tablet 5     acetaminophen (TYLENOL) 500 MG tablet Take 2 tablets (1,000 mg) by mouth every 6 hours as needed for mild pain 100 tablet 0     fluticasone (FLONASE) 50 MCG/ACT spray Spray 1-2 sprays into both nostrils daily (Patient taking differently: Spray 1-2 sprays into both nostrils daily as needed ) 1 Bottle 11     fluticasone (FLOVENT HFA) 110 MCG/ACT inhaler Inhale 2 puffs into the lungs 2 times daily 2 puffs (Patient taking differently: Inhale 2 puffs into the lungs 2 times daily as needed 2 puffs) 1 Inhaler 12     metroNIDAZOLE (METROCREAM) 0.75 % cream APPLY TOPICALLY TWO TIMES A DAY TO FACE 45 g 5     [DISCONTINUED] lisinopril (PRINIVIL/ZESTRIL) 5 MG tablet Take 1 tablet (5 mg) by mouth daily 90 tablet 1     [DISCONTINUED] albuterol (PROAIR HFA, PROVENTIL HFA, VENTOLIN HFA) 108 (90 BASE) MCG/ACT inhaler Inhale 2 puffs into the lungs every 6 hours as needed for shortness of breath / dyspnea 1 Inhaler 5     Allergies   Allergen  Reactions     Augmentin Itching     itching, rash     Fosamax Diarrhea     Diarrhea, stomach cramps, jaw pain      Recent Labs   Lab Test  06/19/17   1350  05/11/17   1225  04/24/17   1604  04/21/16   1009   11/04/14   0853  10/28/13   0932   10/11/11   0941   A1C   --    --   5.5   --    --    --    --    --    --    LDL   --    --    --   108*   --   106  115   < >  104   HDL   --    --    --   76   --   70  68   < >  63   TRIG   --    --    --   59   --   77  73   < >  72   ALT  23  20  22  24   --   17   --    --   15   CR  0.83  0.68  0.61  0.66   < >  0.76  0.78   < >  0.74   GFRESTIMATED  69  88  >90  Non  GFR Calc    >90  Non  GFR Calc     < >  78  76   < >  81   GFRESTBLACK  84  >90   GFR Calc    >90   GFR Calc    >90   GFR Calc     < >  >90   GFR Calc    >90   < >  >90   POTASSIUM  4.1  4.2  3.9  4.0   < >  4.0  4.3   < >  4.9   TSH  0.75   --    --    --    --    --    --    --   1.48    < > = values in this interval not displayed.        Mammogram: Patient over age 50, mutual decision to screen reflected in health maintenance.    Ma Screening Digital Bilateral    Result Date: 1/7/2016  Narrative: SCREENING MAMMOGRAM, BILATERAL, DIGITAL w/CAD - 1/7/2016 10:35 AM. BREAST SYMPTOMS: No current breast complaints. COMPARISON:  12/03/2014, 07/13/2011. BREAST DENSITY: Almost entirely fat. COMMENTS: The left mammogram is unremarkable. Small focal asymmetry deep in the mid right breast on the oblique view only. Additional diagnostic imaging recommended.        Ma Screening Digital Bilat - Future  (s+30)    Result Date: 12/3/2014  Narrative: SCREENING MAMMOGRAM, BILATERAL, DIGITAL w/CAD - 12/3/2014 11:43 AM. BREAST SYMPTOMS: No current breast complaints. COMPARISON:  11/25/2013, 01/13/2010. BREAST DENSITY: Almost entirely fat. COMMENTS: No findings of suspicion for malignancy.          Ma Screening Digital  Bilateral    Result Date: 11/26/2013  Narrative: SCREENING MAMMOGRAM, BILATERAL, DIGITAL w/CAD - November 25, 2013 BREAST SYMPTOMS: No current breast complaints. COMPARISON: 10/23/2012, 7/13/2011, 1/13/2010, 1/12/2009. BREAST DENSITY: Scattered fibroglandular densities. COMMENTS: No findings of suspicion for malignancy.            History of abnormal Pap smear: NO - age 30- 65 PAP every 3 years recommended    Lab Results   Component Value Date    PAP NIL 04/21/2016    PAP NIL 10/23/2012    PAP NIL 10/11/2011       Reviewed and updated as needed this visit by clinical staff  Tobacco  Allergies  Meds  Med Hx  Surg Hx  Fam Hx  Soc Hx        Reviewed and updated as needed this visit by Provider  Med Hx         ROS:  C: NEGATIVE for fever, chills, change in weight  I: NEGATIVE for worrisome rashes, moles or lesions  E: NEGATIVE for vision changes or irritation  ENT: NEGATIVE for ear, mouth and throat problems  R: NEGATIVE for significant cough or SOB  B: NEGATIVE for masses, tenderness or discharge  CV: NEGATIVE for chest pain, palpitations or peripheral edema  GI: NEGATIVE for nausea, abdominal pain, heartburn, or change in bowel habits  : NEGATIVE for unusual urinary or vaginal symptoms. No vaginal bleeding.  M: NEGATIVE for significant arthralgias or myalgia  N: NEGATIVE for weakness, dizziness or paresthesias  P: NEGATIVE for changes in mood or affect   Concussion symptoms nearly fully resolved.  98% better. Going to one more therapy session for her anxiety and breathing through her anxiety s/p her concussion with sleep issues and diarrheal episode.    Is back to her exercise regimen.  Sleeping really well with seroquel and remeron, Getting a little bit shakey ? With one of her meds with hands extended.  No problems with holding things ,writing , or doing fine detailed work.   Seeing another doctor for her anxiety in 10/2017.     OBJECTIVE:   /80 (BP Location: Left arm, Patient Position: Chair, Cuff  "Size: Adult Regular)  Pulse 83  Temp 98.1  F (36.7  C) (Oral)  Ht 5' 8.25\" (1.734 m)  Wt 150 lb 9.6 oz (68.3 kg)  LMP 06/28/2001  SpO2 98%  BMI 22.73 kg/m2  EXAM:  GENERAL APPEARANCE: healthy, alert and no distress.   EYES: Eyes grossly normal to inspection, PERRL and conjunctivae and sclerae normal  HENT: ear canals and TM's normal, nose and mouth without ulcers or lesions, oropharynx clear and oral mucous membranes moist  NECK: no adenopathy, no asymmetry, masses, or scars and thyroid normal to palpation  RESP: lungs clear to auscultation - no rales, rhonchi or wheezes  BREAST: normal without masses, tenderness or nipple discharge and no palpable axillary masses or adenopathy  CV: regular rate and rhythm, normal S1 S2, no S3 or S4, no murmur, click or rub, no peripheral edema and peripheral pulses strong  ABDOMEN: soft, nontender, no hepatosplenomegaly, no masses and bowel sounds normal  MS: no musculoskeletal defects are noted and gait is age appropriate without ataxia, Raynaud's phenomenon noted on bilateral feet - purplish color when cold and came back to pink with warming. Good DP /PT pulses bilaterally.   SKIN: no suspicious lesions or rashes  NEURO: Normal strength and tone, sensory exam grossly normal, mentation intact and speech normal  PSYCH: mentation appears normal and affect normal/bright    ASSESSMENT/PLAN:       ICD-10-CM    1. Encounter for routine adult medical exam with abnormal findings Z00.01    2. Screen for colon cancer Z12.11 GASTROENTEROLOGY ADULT REF PROCEDURE ONLY   3. Asymptomatic postmenopausal status Z78.0 DEXA HIP/PELVIS/SPINE - Future   4. Visit for screening mammogram Z12.31 MA SCREENING DIGITAL BILAT - Future  (s+30)   5. Need for prophylactic vaccination and inoculation against influenza Z23    6. Essential hypertension with goal blood pressure less than 140/90- well controlled today  I10 CBC with platelets   7. CARDIOVASCULAR SCREENING; LDL GOAL LESS THAN 160 Z13.6 Lipid " "panel reflex to direct LDL     TSH with free T4 reflex     Glucose   8. Essential hypertension with goal blood pressure less than 140/90 - now better controlled I10 lisinopril (PRINIVIL/ZESTRIL) 5 MG tablet   9. Cough R05 albuterol (PROAIR HFA/PROVENTIL HFA/VENTOLIN HFA) 108 (90 BASE) MCG/ACT Inhaler   10. Concussion without loss of consciousness, subsequent encounter S06.0X0D    11. Chronic constipation K59.09    12. Raynaud's phenomenon without gangrene I73.00        COUNSELING:   Reviewed preventive health counseling, as reflected in patient instructions         reports that she has never smoked. She has never used smokeless tobacco.    Estimated body mass index is 22.73 kg/(m^2) as calculated from the following:    Height as of this encounter: 5' 8.25\" (1.734 m).    Weight as of this encounter: 150 lb 9.6 oz (68.3 kg).     Recheck with me again in 6 months or sooner , if needed.         Counseling Resources:  ATP IV Guidelines  Pooled Cohorts Equation Calculator  Breast Cancer Risk Calculator  FRAX Risk Assessment  ICSI Preventive Guidelines  Dietary Guidelines for Americans, 2010  USDA's MyPlate  ASA Prophylaxis  Lung CA Screening    Helga Ibarra MD  [facility]  "

## 2017-09-14 NOTE — PATIENT INSTRUCTIONS
Recheck with me again in 6 months or sooner , if needed.       DAILY FIBER THERAPY MIX: for chronic constipation:     1/2 cup Konsyl (psyllium fiber)  from [employer] (if available - otherwise use citrucel 1/2 cup), 1 cup of oat bran, 1 cup of wheat bran and 1 cup of flax seed meal - ground - = mix it together in a container or zip-loc bag and take 1 teaspoon in 1 cup of warm water daily, , follow with 1 -8 oz of water.        Preventive Health Recommendations  Female Ages 50 - 64    Yearly exam: See your health care provider every year in order to  o Review health changes.   o Discuss preventive care.    o Review your medicines if your doctor has prescribed any.      Get a Pap test every three years (unless you have an abnormal result and your provider advises testing more often).    If you get Pap tests with HPV test, you only need to test every 5 years, unless you have an abnormal result.     You do not need a Pap test if your uterus was removed (hysterectomy) and you have not had cancer.    You should be tested each year for STDs (sexually transmitted diseases) if you're at risk.     Have a mammogram every 1 to 2 years.    Have a colonoscopy at age 50, or have a yearly FIT test (stool test). These exams screen for colon cancer.      Have a cholesterol test every 5 years, or more often if advised.    Have a diabetes test (fasting glucose) every three years. If you are at risk for diabetes, you should have this test more often.     If you are at risk for osteoporosis (brittle bone disease), think about having a bone density scan (DEXA).    Shots: Get a flu shot each year. Get a tetanus shot every 10 years.    Nutrition:     Eat at least 5 servings of fruits and vegetables each day.    Eat whole-grain bread, whole-wheat pasta and brown rice instead of white grains and rice.    Talk to your provider about Calcium and Vitamin D.     Lifestyle    Exercise at least 150 minutes a week (30 minutes a day, 5 days a week).  This will help you control your weight and prevent disease.    Limit alcohol to one drink per day.    No smoking.     Wear sunscreen to prevent skin cancer.     See your dentist every six months for an exam and cleaning.    See your eye doctor every 1 to 2 years.               Thank you for choosing [facility]  for your Health Care. It was a pleasure seeing you at your visit today. Please contact us with any questions or concerns you may have.                   Helga Ibarra MD                                  To reach your [facility] care team after hours call:   [phone]    Our clinic hours are:     Monday- 7:30 am - 7:00 pm                             Tuesday through Friday- 7:30 am - 5:00 pm                                        Saturday- 8:00 am - 12:00 pm                  Phone:  [phone]    Our pharmacy hours are:     Monday  8:00 am to 7:00 pm      Tuesday through Friday 8:00am to 6:00pm                        Saturday - 9:00 am to 1:00 pm      Sunday : Closed.              Phone:  [phone]      There is also information available at our web site:  [email or web address]    If your provider ordered any lab tests and you do not receive the results within 10 business days, please call the clinic.    If you need a medication refill please contact your pharmacy.  Please allow 2 business days for your refill to be completed.    Our clinic offers telephone visits and e visits.  Please ask one of your team members to explain more.      Use [employer] (secure email communication and access to your chart) to send your primary care provider a message or make an appointment. Ask someone on your Team how to sign up for [employer].                              Raynaud's Phenomenon  What is Raynaud's phenomenon?   Raynaud's phenomenon is a disorder of the small blood vessels that feed the skin. During an attack of Raynaud's, these blood vessels narrow briefly, limiting blood  flow to the skin. The skin first turns white, then blue. Then the skin turns red as the vessels relax and blood flows again. Hands and feet are most commonly affected, but Raynaud's phenomenon can affect other areas such as the nose and ears.   Women between the ages of 15 and 50 are most often affected, but anyone can have the problem.   How does it occur?   For most people, an attack is usually triggered by exposure to cold or emotional stress. For example, reaching into a refrigerator may trigger an attack.   There are 2 forms of Raynaud's phenomenon. Most people who have Raynaud's phenomenon have the primary form (the milder version). Its cause is not known.   Secondary Raynaud's phenomenon is caused by another disease or condition and is a more serious disorder. Connective tissue diseases are the most common cause. Medical conditions that may cause secondary Raynaud's phenomenon include:   Scleroderma (a thickening and hardening of the skin and other body tissues). Raynaud's phenomenon is seen in approximately 85 to 95% of people with scleroderma.   Systemic lupus erythematosus (a chronic inflammation of the skin and organ systems).   Rheumatoid arthritis (a chronic inflammation and swelling of tissue in the joints).   Blood flow reduction (problems that slow or stop blood flow in a vessel, such as inflammation and hardening of the arteries).   Pulmonary hypertension (high blood pressure in the lungs).   Sj?gren's syndrome (a disorder in which immune cells attack and destroy the glands that produce tears and saliva).   Polymyositis (a chronic disease that causes inflammation and weakness of the muscles).   Dermatomyositis (a form of polymyositis with skin symptoms).   Carpal tunnel syndrome (a painful disorder of the hand and wrist caused by pressure on a nerve in the wrist).   Some drugs may cause Raynaud's phenomenon. Examples of such drugs are beta blockers used to treat high blood pressure, ergotamine  medicines used for migraine headaches, anticancer drugs, nonprescription cold medicines, and narcotics. Smoking can also cause Raynaud's phenomenon.   Injuries from frostbite, surgery, or some jobs may also cause Raynaud's phenomenon. Some workers in the plastics industry who are exposed to vinyl chloride develop a scleroderma-like illness and have Raynaud's phenomenon. Regular use of machinery such as chain saws and vibrating drills can hurt blood vessels.   What are the symptoms?   Symptoms include changes in skin color (white to blue to red) and skin temperature (the area feels cooler). It is common for the area to feel numb or prickly as the hands or feet warm up again and the blood flow returns.   How is it diagnosed?   Your healthcare provider will ask about your medical history and examine you. You may have blood tests. Depending on your history and exam, your provider may check for diseases or other conditions that cause secondary Raynaud's.   How is it treated?   Most healthcare providers recommend nondrug treatments and self-help measures first, as described below in the section on taking care of yourself.   Several kinds of medicines are sometimes used to treat severe Raynaud's symptoms. They all improve circulation. Types of drugs that might be prescribed are calcium channel blockers, alpha blockers, and vasodilators. Nitroglycerin paste, which is applied to the fingers, helps heal skin sores.   A drug may become less effective over time. Women of childbearing age should know that the medicines used to treat Raynaud's phenomenon might affect the baby. If you are pregnant or are trying to become pregnant, talk with your healthcare provider before taking these medicines.   If you are taking a medicine that seems to be causing Raynaud's phenomenon, let your healthcare provider know. You may need to change your medicine or dosage.   How long do the effects last?   Each episode of symptoms usually lasts for  "just a matter of minutes, but some may last more than an hour.   Raynaud's phenomenon cannot be cured, but most people are able to manage the symptoms.   How can I take care of myself?   Protect yourself from cold and keep all parts of your body warm. When you are outdoors in the winter, wear scarves, warm socks, boots, and mittens. (Mittens are better than gloves, which allow too much heat to escape.) Make sure your wrists are covered. Indoors, wear socks and comfortable shoes. When taking food out of the refrigerator or freezer, use mittens, oven mitts, or potholders. Avoid very cold air conditioning.   Guard against cuts, bruises, and other injuries to the areas affected by Raynaud's phenomenon.   If you smoke, quit. Smoking can further decrease blood flow to the fingers. In some cases smoking itself can cause Raynaud's.   You may be able to prevent or stop attacks using biofeedback, a technique in which you are taught to \"think\" your fingers or toes warm.   Control stress.   Exercise regularly, according to your healthcare provider's recommendations.   Keep your regular follow-up appointments with your healthcare provider. See your healthcare provider sooner if you have questions or concerns.   For more information, you may wish to contact:   National Deputy of Arthritis and Musculoskeletal and Skin Diseases   Phone: [phone] ([phone]) (free of charge)   Web site: http://www.[facility].nih.gov               Thank you for choosing [facility]  for your Health Care. It was a pleasure seeing you at your visit today. Please contact us with any questions or concerns you may have.                   Helga Ibarra MD                                  To reach your [facility] care team after hours call:   [phone]    Our clinic hours are:     Monday- 7:30 am - 7:00 pm                             Tuesday through Friday- 7:30 am - 5:00 pm                                    "     Saturday- 8:00 am - 12:00 pm                  Phone:  [phone]    Our pharmacy hours are:     Monday  8:00 am to 7:00 pm      Tuesday through Friday 8:00am to 6:00pm                        Saturday - 9:00 am to 1:00 pm      Sunday : Closed.              Phone:  [phone]      There is also information available at our web site:  [email or web address]    If your provider ordered any lab tests and you do not receive the results within 10 business days, please call the clinic.    If you need a medication refill please contact your pharmacy.  Please allow 2 business days for your refill to be completed.    Our clinic offers telephone visits and e visits.  Please ask one of your team members to explain more.      Use [employer] (secure email communication and access to your chart) to send your primary care provider a message or make an appointment. Ask someone on your Team how to sign up for [employer].

## 2017-09-15 ASSESSMENT — ANXIETY QUESTIONNAIRES: GAD7 TOTAL SCORE: 0

## 2017-09-19 ENCOUNTER — HOSPITAL ENCOUNTER (OUTPATIENT)
Dept: MAMMOGRAPHY | Facility: CLINIC | Age: 62
Discharge: HOME | End: 2017-09-19
Attending: FAMILY MEDICINE | Admitting: FAMILY MEDICINE
Payer: COMMERCIAL

## 2017-09-19 DIAGNOSIS — Z12.31 VISIT FOR SCREENING MAMMOGRAM: ICD-10-CM

## 2017-09-19 PROCEDURE — G0202 SCR MAMMO BI INCL CAD: HCPCS

## 2017-10-05 ENCOUNTER — RADIANT APPOINTMENT (OUTPATIENT)
Dept: BONE DENSITY | Facility: CLINIC | Age: 62
End: 2017-10-05
Attending: FAMILY MEDICINE
Payer: COMMERCIAL

## 2017-10-05 DIAGNOSIS — Z78.0 ASYMPTOMATIC POSTMENOPAUSAL STATUS: ICD-10-CM

## 2017-10-05 PROCEDURE — 77080 DXA BONE DENSITY AXIAL: CPT | Performed by: INTERNAL MEDICINE

## 2017-10-09 ENCOUNTER — OFFICE VISIT (OUTPATIENT)
Dept: PSYCHOLOGY | Facility: CLINIC | Age: 62
End: 2017-10-09
Payer: COMMERCIAL

## 2017-10-09 DIAGNOSIS — F43.22 ADJUSTMENT DISORDER WITH ANXIETY: Primary | ICD-10-CM

## 2017-10-09 PROCEDURE — 90834 PSYTX W PT 45 MINUTES: CPT | Performed by: PSYCHOLOGIST

## 2017-10-09 NOTE — MR AVS SNAPSHOT
MRN:[id number]                      After Visit Summary   10/9/2017    Jolynn Haji    MRN: [id number]           Visit Information        Provider Department      10/9/2017 1:00 PM Ashley Redmond PsyD [facility] DISCHARGE      Your next 10 appointments already scheduled     Oct 25, 2017  9:15 AM CDT   Adult Med Follow UP with FORTINO Reaves CNS   Psychiatry Clinic ([facility])    [address]  [address] [facility] [id number] [phone]              MyChart Information     [employer] gives you secure access to your electronic health record. If you see a primary care provider, you can also send messages to your care team and make appointments. If you have questions, please call your primary care clinic.  If you do not have a primary care provider, please call [phone] and they will assist you.        Care EveryWhere ID     This is your Care EveryWhere ID. This could be used by other organizations to access your [place] medical records  [id number]        Equal Access to Services     CANDI AVILA : Hadii jimena edwards hadasho Soomaali, waaxda luqadaha, qaybta kaalmada adeegyabarry, teri herrera . So [facility] [phone].    ATENCIÓN: Si habla español, tiene a christensen disposición servicios gratuitos de asistencia lingüística. Llame al [phone].    We comply with applicable federal civil rights laws and [place] laws. We do not discriminate on the basis of race, color, national origin, age, disability, sex, sexual orientation, or gender identity.

## 2017-10-10 NOTE — PROGRESS NOTES
"                       Discharge Summary  Multiple Sessions    Client Name: Jolynn Haji MRN#: [id number] YOB: 1955    Discharge Date:   October 10, 2017      Service Type: Individual      Session Start Time: 1:00  Session End Time: 1:45      Session Length: 45     Session #: 3     Attendees: Client attended alone    Focus of Treatment Objective(s):  Client's presenting concerns included: Adjustment Difficulties related to: recovery from concussion  Stage of Change at time of Discharge: MAINTENANCE (Working to maintain change, with risk of relapse)    Medication Adherence:  Yes    Chemical Use:  NA    Assessment: Current Emotional / Mental Status (status of significant symptoms):    Risk status (Self / Other harm or suicidal ideation)  Client denies current fears or concerns for personal safety.  Client denies current or recent suicidal ideation or behaviors.  Client denies current or recent homicidal ideation or behaviors.  Client denies current or recent self injurious behavior or ideation.  Client denies other safety concerns.  A safety and risk management plan has not been developed at this time, however client was given the after-hours number should there be a change in any of these risk factors.    Appearance:   Appropriate   Eye Contact:   Good   Psychomotor Behavior: Normal   Attitude:   Cooperative  Pleasant   Orientation:   All  Speech   Rate / Production: Normal    Volume:  Normal   Mood:    Normal with transient periods of anxiety  Affect:    Appropriate  Bright   Thought Content:  episodes of worry   Thought Form:  Coherent  Logical   Insight:   Good     DSM5 Diagnoses: (Sustained by DSM5 Criteria Listed Above)  Diagnoses: Adjustment Disorders  309.24 (F43.22) With anxiety  Psychosocial & Contextual Factors: concussion 4/2017  WHODAS 2.0 (12 item) Score: 14    Reason for Discharge:  Client is satisfied with progress. Reported that she is \"98% back to normal\" and feels she has more reasonable " "expectations about her recovery from her concussion. Reported that she is more aware of thinking errors that increase anxiety and has is feeling confident with how she has been identifying and challenging those thinking errors. Also reported that she has been \"pacing myself\" better and allowing adequate times for rest when her body gives her signals that she is in need of a break.       Aftercare Plan:  Client may resume counseling services at any time in the future by calling the [facility] Intake Office, [phone].      Ashley Redmond PsyD LP  "

## 2017-10-13 ENCOUNTER — HEALTH MAINTENANCE LETTER (OUTPATIENT)
Age: 62
End: 2017-10-13

## 2017-10-25 ENCOUNTER — OFFICE VISIT (OUTPATIENT)
Dept: PSYCHIATRY | Facility: CLINIC | Age: 62
End: 2017-10-25
Attending: CLINICAL NURSE SPECIALIST
Payer: COMMERCIAL

## 2017-10-25 VITALS
DIASTOLIC BLOOD PRESSURE: 82 MMHG | BODY MASS INDEX: 24.09 KG/M2 | HEART RATE: 86 BPM | SYSTOLIC BLOOD PRESSURE: 163 MMHG | WEIGHT: 159.6 LBS

## 2017-10-25 DIAGNOSIS — F41.1 GAD (GENERALIZED ANXIETY DISORDER): ICD-10-CM

## 2017-10-25 DIAGNOSIS — F41.9 ANXIETY DISORDER, UNSPECIFIED TYPE: ICD-10-CM

## 2017-10-25 DIAGNOSIS — G47.00 INSOMNIA, UNSPECIFIED TYPE: Primary | ICD-10-CM

## 2017-10-25 DIAGNOSIS — F43.23 ADJUSTMENT DISORDER WITH MIXED ANXIETY AND DEPRESSED MOOD: ICD-10-CM

## 2017-10-25 PROCEDURE — 99212 OFFICE O/P EST SF 10 MIN: CPT | Mod: ZF

## 2017-10-25 RX ORDER — ESCITALOPRAM OXALATE 10 MG/1
10 TABLET ORAL DAILY
Qty: 90 TABLET | Refills: 1 | Status: SHIPPED | OUTPATIENT
Start: 2017-10-25 | End: 2018-04-25

## 2017-10-25 RX ORDER — QUETIAPINE FUMARATE 25 MG/1
50 TABLET, FILM COATED ORAL AT BEDTIME
Qty: 180 TABLET | Refills: 1 | Status: SHIPPED | OUTPATIENT
Start: 2017-10-25 | End: 2018-04-25

## 2017-10-25 RX ORDER — MIRTAZAPINE 30 MG/1
30 TABLET, FILM COATED ORAL AT BEDTIME
Qty: 90 TABLET | Refills: 1 | Status: SHIPPED | OUTPATIENT
Start: 2017-10-25 | End: 2018-04-25

## 2017-10-25 NOTE — MR AVS SNAPSHOT
After Visit Summary   10/25/2017    Jolynn Haji    MRN: [id number]           Patient Information     Date Of Birth          1955        Visit Information        Provider Department      10/25/2017 9:15 AM Kim Brantley APRN CNS Psychiatry Clinic        Today's Diagnoses     Insomnia, unspecified type    -  1    Anxiety disorder, unspecified type        TIFFANY (generalized anxiety disorder)        Adjustment disorder with mixed anxiety and depressed mood- mild at this time           Follow-ups after your visit        Your next 10 appointments already scheduled     Apr 25, 2018 10:15 AM CDT   Adult Med Follow UP with FORTINO Reaves CNS   Psychiatry Clinic ([facility])    [address]  [address] [facility] [id number] [phone]              Who to contact     Please call your clinic at [phone] to:    Ask questions about your health    Make or cancel appointments    Discuss your medicines    Learn about your test results    Speak to your doctor   If you have compliments or concerns about an experience at your clinic, or if you wish to file a complaint, please contact [facility] Patient Relations at [phone] or email us at [email or web address]         Additional Information About Your Visit        MyChart Information     [employer] gives you secure access to your electronic health record. If you see a primary care provider, you can also send messages to your care team and make appointments. If you have questions, please call your primary care clinic.  If you do not have a primary care provider, please call [phone] and they will assist you.      [employer] is an electronic gateway that provides easy, online access to your medical records. With [employer], you can request a clinic appointment, read your test results, renew a prescription or communicate with your care team.     To access your  existing account, please contact your [facility] or call [phone] for assistance.        Care EveryWhere ID     This is your Care EveryWhere ID. This could be used by other organizations to access your [facility] medical records  [id number]        Your Vitals Were     Pulse Last Period BMI (Body Mass Index)             86 06/28/2001 24.09 kg/m2          Blood Pressure from Last 3 Encounters:   10/25/17 163/82   09/14/17 118/80   07/25/17 125/78    Weight from Last 3 Encounters:   10/25/17 72.4 kg (159 lb 9.6 oz)   09/14/17 68.3 kg (150 lb 9.6 oz)   07/25/17 66.4 kg (146 lb 6.4 oz)              Today, you had the following     No orders found for display         Today's Medication Changes          These changes are accurate as of: 10/25/17 11:59 PM.  If you have any questions, ask your nurse or doctor.               These medicines have changed or have updated prescriptions.        Dose/Directions    fluticasone 110 MCG/ACT Inhaler   Commonly known as:  FLOVENT HFA   This may have changed:    - when to take this  - reasons to take this  - additional instructions   Used for:  Cough        Dose:  2 puff   Inhale 2 puffs into the lungs 2 times daily 2 puffs   Quantity:  1 Inhaler   Refills:  12       fluticasone 50 MCG/ACT spray   Commonly known as:  FLONASE   This may have changed:    - when to take this  - reasons to take this   Used for:  Acute recurrent maxillary sinusitis        Dose:  1-2 spray   Spray 1-2 sprays into both nostrils daily   Quantity:  1 Bottle   Refills:  11            Where to get your medicines      These medications were sent to [facility] - [address], [facility] [address]     Phone:  [phone]     escitalopram 10 MG tablet    mirtazapine 30 MG tablet    QUEtiapine 25 MG tablet                Primary Care Provider Office Phone # Fax #    Helga Ibarra [phone]  [phone]       [address] [place] [address]        Equal Access to Services     CANDI AVILA : Hadii aad grace hadnachoo Sosuleiman, waaxda luqadaha, qaybta kaalmada adejessika, teri olgain hayaaduncan wendimitry vu javier galvan. So [facility] [phone].    ATENCIÓN: Si habla español, tiene a christensen disposición servicios gratuitos de asistencia lingüística. Llame al [phone].    We comply with applicable federal civil rights laws and [place] laws. We do not discriminate on the basis of race, color, national origin, age, disability, sex, sexual orientation, or gender identity.            Thank you!     Thank you for choosing PSYCHIATRY CLINIC  for your care. Our goal is always to provide you with excellent care. Hearing back from our patients is one way we can continue to improve our services. Please take a few minutes to complete the written survey that you may receive in the mail after your visit with us. Thank you!             Your Updated Medication List - Protect others around you: Learn how to safely use, store and throw away your medicines at www.disposemymeds.org.          This list is accurate as of: 10/25/17 11:59 PM.  Always use your most recent med list.                   Brand Name Dispense Instructions for use Diagnosis    acetaminophen 500 MG tablet    TYLENOL    100 tablet    Take 2 tablets (1,000 mg) by mouth every 6 hours as needed for mild pain    Concussion, with loss of consciousness of 30 minutes or less, initial encounter       albuterol 108 (90 BASE) MCG/ACT Inhaler    PROAIR HFA/PROVENTIL HFA/VENTOLIN HFA    1 Inhaler    Inhale 2 puffs into the lungs every 6 hours as needed for shortness of breath / dyspnea    Cough       ALLEGRA ALLERGY PO      Take by mouth daily        escitalopram 10 MG tablet    LEXAPRO    90 tablet    Take 1 tablet (10 mg) by mouth daily    TIFFANY (generalized anxiety disorder)       fluticasone 110 MCG/ACT Inhaler    FLOVENT HFA    1 Inhaler    Inhale 2 puffs into the  lungs 2 times daily 2 puffs    Cough       fluticasone 50 MCG/ACT spray    FLONASE    1 Bottle    Spray 1-2 sprays into both nostrils daily    Acute recurrent maxillary sinusitis       lisinopril 5 MG tablet    PRINIVIL/ZESTRIL    90 tablet    Take 1 tablet (5 mg) by mouth daily    Essential hypertension with goal blood pressure less than 140/90       metroNIDAZOLE 0.75 % cream    METROCREAM    45 g    APPLY TOPICALLY TWO TIMES A DAY TO FACE    Rosacea       mirtazapine 30 MG tablet    REMERON    90 tablet    Take 1 tablet (30 mg) by mouth At Bedtime    TIFFANY (generalized anxiety disorder), Adjustment disorder with mixed anxiety and depressed mood       QUEtiapine 25 MG tablet    SEROQUEL    180 tablet    Take 2 tablets (50 mg) by mouth At Bedtime    Anxiety disorder, unspecified type

## 2017-10-25 NOTE — NURSING NOTE
Chief Complaint   Patient presents with     Recheck Medication      Adjustment disorder with mixed anxiety and depressed mood       Reviewed allergies, smoking status, and pharmacy preference  Administered abuse screening question  Obtained weight, blood pressure and heart rate

## 2017-10-25 NOTE — PROGRESS NOTES
Outpatient Psychiatry Progress Note     Provider: FORTINO Reaves CNS  Date: 10/25/2017  Service:  Medication follow up with counseling.   Patient Identification: Jolynn Haji  : 1955   MRN: [id number]    Jolynn Haji is a 62 year old year old female who presents for ongoing psychiatric care.  Jolynn Haji was last seen in clinic on 17.   At that time,   Assessment & Plan       Jolynn Haji is seen today for follow up and reports significant improvement in her anxiety and sleep though she still occasionally perseverate on return of anxiety symptoms and not continuing improvement.  Pt is also asking what would be the plan of staying on medications.  Discussed with patient and her spouse that usually recommendation is to stay on the medication at least 6 months to 1 year.  Also discussed realistic tolerance development with anxiety through psychotherapy.  Seeing friends and going out easily, no problems driving.      Diagnosis  Axis 1: TIFFANY, Adjustment disorder with mixed anxiety and depression  Axis 2: none  Axis 3: See problem list in the medical record     Plan:  Medication: Continue current medications  OTC Recommendations: none  Lab Orders:  none  Referrals: has appointment with Ashley Amor 17.    Release of Information: none  Future Treatment Considerations:per symptoms  Return for Follow Up:3 months      ____________________________________________________________________________________________________________________________________________    10/25/2017  Today Jolynn reports she is feeling very well. Feels that she has recovered from head injury mostly.  During yoga through community ed and the teacher is helping with balance.    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    Side effects of medication include: tinnitis mostly in the evening.  Psychiatric Review of Systems:  The patient endorses symptoms of depression: In the last 2 weeks per PHQ 9 score zero.  She   patient endorses symptoms of anxiety : improving significantly  She endorses symptoms of reginald including denies.    She endorses symptoms of psychosis including no psychotic symptoms.       Review of Medical Systems:  Sleep: sleeping 8 to 10 hours a day  Energy: stable  Concentration: improving  Appetite: improving  GI Concerns: no new concerns  Cardiac concerns: no new concerns  Neurological concerns: no new concerns  Other medical concerns: occasional bilateral ear ringing since started medication, but not too concerned as she had this before    Current Substance Use:  Alcohol:denies frequent use or Abuse.   Other drugs:denies  Caffeine:one cup of coffee  Nicotine: none    Past Medical History:   Past Medical History:   Diagnosis Date     Abnormal glandular Papanicolaou smear of cervix      Abnormal glandular Papanicolaou smear of cervix- ASCUS in 2000 12/5/2003    ASC H- 2000     Basal cell carcinoma      Basal cell carcinoma of leg, right 5/2016     Hidradenitis     left  groin     HSIL (high grade squamous intraepithelial lesion) on Pap smear of cervix 7/5/2000    Normal paps from 2001 to 2016/cc     Hypertension goal BP (blood pressure) < 140/90      OA (osteoarthritis) of knee     moderate     Postmenopausal since 7/2008      Shingles 7/23/2010    left      Synovial cyst of popliteal space      Patient Active Problem List   Diagnosis     Essential hypertension with goal blood pressure less than 140/90     Hidradenitis     Localized osteoarthritis of hand     Synovial cyst of popliteal space     DERMATOPHYTOSIS OF NAIL- toenails      Lipidosis     Osteopenia     Postmenopausal     NUMBNESS AND TINGLING OF FOOT - bilateral -mild      CARDIOVASCULAR SCREENING; LDL GOAL LESS THAN 160     Numbness of feet- distal feet R>L      Advanced directives, counseling/discussion     Stress incontinence, female - mild     Cough     Acute bronchitis- recurrent- ? related to mold in school building- pt has since retired and no  further bronchitis     Rosacea     History of migraine headaches- assoc. with nausea/vomiting - resolved around menopause - were  premenstrual      Dupuytren's disease of palm - right 4th digit flexor tendon     Sun-damaged skin     Seasonal allergic rhinitis     Basal cell carcinoma of leg, right     Concussion without loss of consciousness     Adjustment disorder with mixed anxiety and depressed mood- mild at this time     Family history of celiac disease     TIFFANY (generalized anxiety disorder)     Insomnia, unspecified type     Chronic constipation     Raynaud's phenomenon without gangrene       Allergies:   Allergies   Allergen Reactions     Augmentin Itching     itching, rash     Fosamax Diarrhea     Diarrhea, stomach cramps, jaw pain           Current Medications     Current [facility] in [facility]   Medication Sig Dispense Refill     Fexofenadine HCl (ALLEGRA ALLERGY PO) Take by mouth daily       lisinopril (PRINIVIL/ZESTRIL) 5 MG tablet Take 1 tablet (5 mg) by mouth daily 90 tablet 1     albuterol (PROAIR HFA/PROVENTIL HFA/VENTOLIN HFA) 108 (90 BASE) MCG/ACT Inhaler Inhale 2 puffs into the lungs every 6 hours as needed for shortness of breath / dyspnea 1 Inhaler 5     escitalopram (LEXAPRO) 10 MG tablet Take 1 tablet (10 mg) by mouth daily 30 tablet 5     mirtazapine (REMERON) 30 MG tablet Take 1 tablet (30 mg) by mouth At Bedtime 30 tablet 5     QUEtiapine (SEROQUEL) 25 MG tablet Take 2 tablets (50 mg) by mouth At Bedtime 60 tablet 5     acetaminophen (TYLENOL) 500 MG tablet Take 2 tablets (1,000 mg) by mouth every 6 hours as needed for mild pain 100 tablet 0     fluticasone (FLONASE) 50 MCG/ACT spray Spray 1-2 sprays into both nostrils daily (Patient taking differently: Spray 1-2 sprays into both nostrils daily as needed ) 1 Bottle 11     fluticasone (FLOVENT HFA) 110 MCG/ACT inhaler Inhale 2 puffs into the lungs 2 times daily 2 puffs (Patient taking differently: Inhale 2 puffs into the  "lungs 2 times daily as needed 2 puffs) 1 Inhaler 12     metroNIDAZOLE (METROCREAM) 0.75 % cream APPLY TOPICALLY TWO TIMES A DAY TO FACE 45 g 5     No current Epic-ordered facility-administered medications on file.         Mental Status Exam     Appearance:  Casually dressed and Well groomed  Behavior/relationship to examiner/demeanor:  Cooperative, Engaged and Pleasant  Orientation: Oriented to person, place, time and situation  Psychomotor: normal form  Speech Rate:  Normal  Speech Spontaneity:  Normal  Mood:  \"good\"  Affect:  Appropriate/mood-congruent  Thought Process (Associations):  Goal directed  Thought Content:  no overt psychosis, denies suicidal ideation, intent or thoughts and patient does not appear to be responding to internal stimuli  Abnormal Perception:  None  Attention/Concentration:  Normal  Language:  Intact  Insight:  Good  Judgment:  Fair      Results     Vital signs: /82  Pulse 86  Wt 72.4 kg (159 lb 9.6 oz)  LMP 06/28/2001  BMI 24.09 kg/m2    Laboratory Data:  elevated BP today, but reports increased stress of being lost coming to the clinic    Assessment & Plan      Jolynn Haji is seen today for follow up and reports significant improvement in anxiety and feels like prior to head injury.  Pt discussed about when possibly to wean off medications.  We discussed usually considering to wean off medications 6 months to 1 year after stabilization.  Since there are holidays, discussed if there's no change in symptoms, maybe to consider weaning off in Feb 2018.    Diagnosis  TIFFANY,   Adjustment disorder with mixed anxiety and depression    Plan:  Medication: until February continue current medications.  At that time decrease Seroquel to 25mg at bedtime but continue Lexapro 10mg and Mirtazapine 30mg.  OTC Recommendations: none  Lab Orders:  none  Referrals: none  Release of Information: none  Future Treatment Considerations: If tolerated decrease in Seroquel to 25mg then consider discontinuing " it in April  Return for Follow Up: 6 months   The risks, benefits, alternatives and side effects have been discussed and are understood by the patient. The patient understands the risks of using street drugs or alcohol. There are no medical contraindications, the patient agrees to treatment, and has the capacity to do so. The patient understands to call 911 or come to the nearest ED if life threatening or urgent symptoms present.  In addition time was spent counseling the patient and/or coordinating care regarding review of social and occupational functioning.  In addition patient was counseled on health and wellness practices to augment medication treatment of symptoms. See note for details.    Jaycee Russo, Psychiatric/Mental Health Nurse Practitioner Student, 10/25/2017'    Jaycee Russo, Psychiatric/Mental Health Nurse Practitioner Student, acting as a scribe for FORTINO Reaves.     I , Kim Brantley, personally performed the entire clinical encounter as documented by FORTINO Tipton 10/25/2017

## 2017-11-13 ENCOUNTER — ALLIED HEALTH/NURSE VISIT (OUTPATIENT)
Dept: FAMILY MEDICINE | Facility: CLINIC | Age: 62
End: 2017-11-13
Payer: COMMERCIAL

## 2017-11-13 VITALS — SYSTOLIC BLOOD PRESSURE: 126 MMHG | DIASTOLIC BLOOD PRESSURE: 70 MMHG

## 2017-11-13 DIAGNOSIS — I10 ESSENTIAL HYPERTENSION WITH GOAL BLOOD PRESSURE LESS THAN 140/90: Primary | ICD-10-CM

## 2017-11-13 PROCEDURE — 99207 ZZC NO CHARGE NURSE ONLY: CPT | Performed by: FAMILY MEDICINE

## 2017-11-13 NOTE — PROGRESS NOTES
Jolynn Haji is enrolled/participating in the retail pharmacy Blood Pressure Goals Achievement Program (BPGAP).  Jolynn Haji was evaluated at [facility] on November 13, 2017 at which time her blood pressure was:    BP Readings from Last 3 Encounters:   11/13/17 126/70   09/14/17 118/80   07/13/17 126/64     Reviewed lifestyle modifications for blood pressure control and reduction: including making healthy food choices, managing weight, getting regular exercise, smoking cessation, reducing alcohol consumption, monitoring blood pressure regularly.     Jolynn Haji is not experiencing symptoms.    Follow-Up: BP is at goal of < 140/90mmHg (patient 18+ years of age with or without diabetes).  Recommended follow-up at pharmacy in 6 months.     Recommendation to Provider: No changes    Jolynn Haji was evaluated for enrollment into the PGEN study today.    Patient eligible for enrollment:  No  Patient interested in enrollment:  No    Completed by:Thank you,  Pallavi Bustos RP, Mgr [facility] [place] [phone]

## 2017-11-13 NOTE — MR AVS SNAPSHOT
After Visit Summary   11/13/2017    Jolynn Haji    MRN: [id number]           Patient Information     Date Of Birth          1955        Visit Information        Provider Department      11/13/2017 3:14 PM Helga Ibarra MD [facility]        Today's Diagnoses     Essential hypertension with goal blood pressure less than 140/90    -  1       Follow-ups after your visit        Your next 10 appointments already scheduled     Feb 08, 2018 10:45 AM CST   [facility] with [facility]ldon Rogers [facility]   [facility]    [address] [id number] [phone]            Apr 25, 2018 10:15 AM CDT   Adult Med Follow UP with FORTINO Reaves CNS   Psychiatry Clinic ([facility])    [address] [place] [address] [phone]              Who to contact     If you have questions or need follow up information about today's clinic visit or your schedule please contact [facility] directly at [phone].  Normal or non-critical lab and imaging results will be communicated to you by [employer], letter or phone within 4 business days after the clinic has received the results. If you do not hear from us within 7 days, please contact the clinic through [employer] or phone. If you have a critical or abnormal lab result, we will notify you by phone as soon as possible.  Submit refill requests through [employer] or call your pharmacy and they will forward the refill request to us. Please allow 3 business days for your refill to be completed.          Additional Information About Your Visit        MyChart Information     [employer] gives you secure access to your electronic health record. If you see a primary care provider, you can also send messages to your care team and make appointments. If you have questions, please call your primary  care clinic.  If you do not have a primary care provider, please call [phone] and they will assist you.        Care EveryWhere ID     This is your Care EveryWhere ID. This could be used by other organizations to access your [facility] medical records  [id number]        Your Vitals Were     Last Period                   06/28/2001            Blood Pressure from Last 3 Encounters:   11/13/17 126/70   09/14/17 118/80   07/13/17 126/64    Weight from Last 3 Encounters:   09/14/17 150 lb 9.6 oz (68.3 kg)   07/05/17 144 lb 9.6 oz (65.6 kg)   06/02/17 147 lb 8 oz (66.9 kg)              Today, you had the following     No orders found for display         Today's Medication Changes          These changes are accurate as of: 11/13/17  3:19 PM.  If you have any questions, ask your nurse or doctor.               These medicines have changed or have updated prescriptions.        Dose/Directions    fluticasone 110 MCG/ACT Inhaler   Commonly known as:  FLOVENT HFA   This may have changed:    - when to take this  - reasons to take this  - additional instructions   Used for:  Cough        Dose:  2 puff   Inhale 2 puffs into the lungs 2 times daily 2 puffs   Quantity:  1 Inhaler   Refills:  12       fluticasone 50 MCG/ACT spray   Commonly known as:  FLONASE   This may have changed:    - when to take this  - reasons to take this   Used for:  Acute recurrent maxillary sinusitis        Dose:  1-2 spray   Spray 1-2 sprays into both nostrils daily   Quantity:  1 Bottle   Refills:  11                Primary Care Provider Office Phone # Fax #    Helgamony Ibarra [phone]       [facility] [facility] [address]        Equal Access to Services     [facility] AH: Hadii jimena Bernardo, warobertada luqadaha, qaybta kaalmada teri padilla. So [facility] [phone].    ATENCIÓN: Si habla español, tiene a christensen disposición servicios gratuitos de asistencia lingüística.  Vanita schuster [phone].    We comply with applicable federal civil rights laws and [place] laws. We do not discriminate on the basis of race, color, national origin, age, disability, sex, sexual orientation, or gender identity.            Thank you!     Thank you for choosing [facility]  for your care. Our goal is always to provide you with excellent care. Hearing back from our patients is one way we can continue to improve our services. Please take a few minutes to complete the written survey that you may receive in the mail after your visit with us. Thank you!             Your Updated Medication List - Protect others around you: Learn how to safely use, store and throw away your medicines at www.disposemymeds.org.          This list is accurate as of: 11/13/17  3:19 PM.  Always use your most recent med list.                   Brand Name Dispense Instructions for use Diagnosis    acetaminophen 500 MG tablet    TYLENOL    100 tablet    Take 2 tablets (1,000 mg) by mouth every 6 hours as needed for mild pain    Concussion, with loss of consciousness of 30 minutes or less, initial encounter       albuterol 108 (90 BASE) MCG/ACT Inhaler    PROAIR HFA/PROVENTIL HFA/VENTOLIN HFA    1 Inhaler    Inhale 2 puffs into the lungs every 6 hours as needed for shortness of breath / dyspnea    Cough       ALLEGRA ALLERGY PO      Take by mouth daily        escitalopram 10 MG tablet    LEXAPRO    90 tablet    Take 1 tablet (10 mg) by mouth daily    TIFFANY (generalized anxiety disorder)       fluticasone 110 MCG/ACT Inhaler    FLOVENT HFA    1 Inhaler    Inhale 2 puffs into the lungs 2 times daily 2 puffs    Cough       fluticasone 50 MCG/ACT spray    FLONASE    1 Bottle    Spray 1-2 sprays into both nostrils daily    Acute recurrent maxillary sinusitis       lisinopril 5 MG tablet    PRINIVIL/ZESTRIL    90 tablet    Take 1 tablet (5 mg) by mouth daily    Essential hypertension with goal blood pressure less than 140/90        metroNIDAZOLE 0.75 % cream    METROCREAM    45 g    APPLY TOPICALLY TWO TIMES A DAY TO FACE    Rosacea       mirtazapine 30 MG tablet    REMERON    90 tablet    Take 1 tablet (30 mg) by mouth At Bedtime    TIFFANY (generalized anxiety disorder), Adjustment disorder with mixed anxiety and depressed mood       QUEtiapine 25 MG tablet    SEROQUEL    180 tablet    Take 2 tablets (50 mg) by mouth At Bedtime    Anxiety disorder, unspecified type

## 2018-01-29 ENCOUNTER — RADIANT APPOINTMENT (OUTPATIENT)
Dept: GENERAL RADIOLOGY | Facility: CLINIC | Age: 63
End: 2018-01-29
Attending: FAMILY MEDICINE
Payer: COMMERCIAL

## 2018-01-29 ENCOUNTER — OFFICE VISIT (OUTPATIENT)
Dept: FAMILY MEDICINE | Facility: CLINIC | Age: 63
End: 2018-01-29
Payer: COMMERCIAL

## 2018-01-29 VITALS
SYSTOLIC BLOOD PRESSURE: 124 MMHG | WEIGHT: 159 LBS | OXYGEN SATURATION: 98 % | HEIGHT: 69 IN | BODY MASS INDEX: 23.55 KG/M2 | DIASTOLIC BLOOD PRESSURE: 72 MMHG | HEART RATE: 92 BPM | RESPIRATION RATE: 12 BRPM | TEMPERATURE: 98.2 F

## 2018-01-29 DIAGNOSIS — M17.5 OTHER SECONDARY OSTEOARTHRITIS OF LEFT KNEE: ICD-10-CM

## 2018-01-29 DIAGNOSIS — F43.23 ADJUSTMENT DISORDER WITH MIXED ANXIETY AND DEPRESSED MOOD: ICD-10-CM

## 2018-01-29 DIAGNOSIS — M25.562 ACUTE PAIN OF LEFT KNEE: Primary | ICD-10-CM

## 2018-01-29 DIAGNOSIS — M25.562 LEFT MEDIAL KNEE PAIN: ICD-10-CM

## 2018-01-29 DIAGNOSIS — M25.562 ACUTE PAIN OF LEFT KNEE: ICD-10-CM

## 2018-01-29 PROCEDURE — 99213 OFFICE O/P EST LOW 20 MIN: CPT | Performed by: FAMILY MEDICINE

## 2018-01-29 PROCEDURE — 73565 X-RAY EXAM OF KNEES: CPT | Mod: FY

## 2018-01-29 PROCEDURE — 73560 X-RAY EXAM OF KNEE 1 OR 2: CPT | Mod: LT

## 2018-01-29 NOTE — NURSING NOTE
"Chief Complaint   Patient presents with     Knee Pain     Left knee pain x 2 months       Initial /72  Pulse 92  Temp 98.2  F (36.8  C) (Tympanic)  Resp 12  Ht 5' 9\" (1.753 m)  Wt 159 lb (72.1 kg)  LMP 06/28/2001  SpO2 98%  Breastfeeding? No  BMI 23.48 kg/m2 Estimated body mass index is 23.48 kg/(m^2) as calculated from the following:    Height as of this encounter: 5' 9\" (1.753 m).    Weight as of this encounter: 159 lb (72.1 kg).  Medication Reconciliation: complete   Ernestine Bloedow LPN    "

## 2018-01-29 NOTE — MR AVS SNAPSHOT
After Visit Summary   1/29/2018    Jolynn Haji    MRN: [id number]           Patient Information     Date Of Birth          1955        Visit Information        Provider Department      1/29/2018 3:15 PM Helga Ibarra MD [facility] [place]        Today's Diagnoses     Adjustment disorder with mixed anxiety and depressed mood- mild at this time    -  1    Acute pain of left knee        Left medial knee pain        Other secondary osteoarthritis of left knee          Care Instructions                    Patellofemoral Pain Syndrome (Runner's Knee)             What is patellofemoral pain syndrome?   Patellofemoral pain syndrome is pain behind the kneecap. It may also be called patellofemoral disorder, patellar malalignment, runner's knee, and chondromalacia.   How does it occur?   Patellofemoral pain syndrome can occur from overuse of the knee in sports and activities such as running, walking, jumping, or bicycling.   The kneecap (patella) is attached to the large group of muscles in the thigh called the quadriceps. It is also attached to the shin bone by the patellar tendon. The kneecap fits into grooves in the end of the thigh bone (femur) called the femoral condyle. With repeated bending and straightening of the knee, you can irritate the inside surface of the kneecap and cause pain.   Patellofemoral pain syndrome also may result from the way your hips, legs, knees, or feet are aligned. For example, if you have wide hips or underdeveloped thigh muscles, or if you are knock-kneed You may also have this problem if your foot flattens too much when you walk or run (a condition called over-pronation).   What are the symptoms?   The main symptom is pain behind the kneecap. You may have pain when you walk, run, or sit for a long time. The pain is usually worse when you walk downhill or down stairs. Your knee may swell at times. You may feel or hear snapping, popping, or grinding in the  knee.   How is it diagnosed?   Your healthcare provider will review your symptoms and examine your knee. You will have knee X-rays. You may have an MRI to check for damage to the surface of the patella or femur or another injury.   How is it treated?   Treatment includes the following:   Put an ice pack, gel pack, or package of frozen vegetables, wrapped in a cloth on the area every 3 to 4 hours, for up to 20 minutes at a time.   Raise the knee on a pillow when you sit or lie down.   Take an anti-inflammatory medicine such as ibuprofen, or other medicine as directed by your provider. Nonsteroidal anti-inflammatory medicines (NSAIDs) may cause stomach bleeding and other problems. These risks increase with age. Read the label and take as directed. Unless recommended by your healthcare provider, do not take for more than 10 days.   Follow your provider's instructions for doing exercises to help you recover. Your healthcare provider will show you exercises to help decrease the pain behind your kneecap.   If you over-pronate, your healthcare provider may recommend shoe inserts, called orthotics. You can buy orthotics at a pharmacy or athletic shoe store or they can be custom-made.   Use an infrapatellar strap, a strap placed below the kneecap over the patellar tendon.   Wear a neoprene knee sleeve, which will give support to your knee and patella.   While you recover from your injury, you will need to change your sport or activity to one that does not make your condition worse. For example, you may need to bicycle or swim instead of run.   In cases of severe patellofemoral pain syndrome, surgery may be recommended.   How long will the effects last?   Patellofemoral pain often lasts a long time and can come back after symptoms were better for a while. Treatment requires proper rehabilitation exercises that are done regularly.   When can I return to my normal activities?   Everyone recovers from an injury at a different  rate. Return to your activities depends on how soon your knee recovers, not by how many days or weeks it has been since your injury has occurred. In general, the longer you have symptoms before you start treatment, the longer it will take to get better. The goal is to return you to your normal activities as soon as is safely possible. If you return too soon you may worsen your injury.   You may safely return to your normal activities when, starting from the top of the list and progressing to the end, each of the following is true:   Your injured knee can be fully straightened and bent without pain.   Your knee and leg have regained normal strength compared to the uninjured knee and leg.   You are able to walk, bend, and squat without pain.   How can I prevent runner's knee?   Runner's knee can best be prevented by strengthening your thigh muscles, particularly the inside part of this muscle group. It is also important to wear shoes that fit well and that have good arch supports.     Published by [employer].  This content is reviewed periodically and is subject to change as new health information becomes available. The information is intended to inform and educate and is not a replacement for medical evaluation, advice, diagnosis or treatment by a healthcare professional.   Written by Orion Aleman MD, for [employer]   ? 2010 [employer] and/or its affiliates. All Rights Reserved.   Copyright   [employer] Reference Systems 2011  Adult Health Advisor    Patellofemoral Pain Syndrome (Runner's Knee) Rehabilitation Exercises              You can do the hamstring stretch right away. When the pain in your knee has decreased, you can do the quadriceps stretch and start strengthening the thigh muscles using the rest of the exercises.   Standing hamstring stretch: Put the heel of the leg on your injured side on a stool about 15 inches high. Keep your leg straight. Lean forward, bending at the hips, until you feel a mild  stretch in the back of your thigh. Make sure you don't roll your shoulders or bend at the waist when doing this or you will stretch your lower back instead of your leg. Hold the stretch for 15 to 30 seconds. Repeat 3 times.   Quadriceps stretch: Stand an arm's length away from the wall with your injured leg farthest from the wall. Facing straight ahead, brace yourself by keeping one hand against the wall. With your other hand, grasp the ankle of your injured leg and pull your heel toward your buttocks. Don't arch or twist your back. Keep your knees together. Hold this stretch for 15 to 30 seconds.   Side-lying leg lift: Lie on your uninjured side. Tighten the front thigh muscles on your injured leg and lift that leg 8 to 10 inches away from the other leg. Keep the leg straight and lower it slowly. Do 3 sets of 10.   Quad sets: Sit on the floor with your injured leg straight and your other leg bent. Press the back of the knee of your injured leg against the floor by tightening the muscles on the top of your thigh. Hold this position 10 seconds. Relax. Do 3 sets of 10.   Straight leg raise: Lie on your back with your legs straight out in front of you. Bend the knee on your uninjured side and place the foot flat on the floor. Tighten the thigh muscle on your injured side and lift your leg about 8 inches off the floor. Keep your leg straight and your thigh muscle tight. Slowly lower your leg back down to the floor. Do 3 sets of 10.   Step-up: Stand with the foot of your injured leg on a support 3 to 5 inches high (like a small step or block of wood). Keep your other foot flat on the floor. Shift your weight onto the injured leg on the support. Straighten your injured leg as the other leg comes off the floor. Return to the starting position by bending your injured leg and slowly lowering your uninjured leg back to the floor. Do 3 sets of 10.   Wall squat with a ball: Stand with your back, shoulders, and head against a  wall. Look straight ahead. Keep your shoulders relaxed and your feet 3 feet from the wall and shoulder's width apart. Place a soccer or basketball-sized ball behind your back. Keeping your back against the wall, slowly squat down to a 45-degree angle. Your thighs will not yet be parallel to the floor. Hold this position for 10 seconds and then slowly slide back up the wall. Repeat 10 times. Build up to 3 sets of 10.   Knee stabilization: Wrap a piece of elastic tubing around the ankle of the uninjured leg. Tie a knot in the other end of the tubing and close it in a door.   0. Stand facing the door on the leg without tubing and bend your knee slightly, keeping your thigh muscles tight. While maintaining this position, move the leg with the tubing straight back behind you. Do 3 sets of 10.   0. Turn 90 degrees so the leg without tubing is closest to the door. Move the leg with tubing away from your body. Do 3 sets of 10.   0. Turn 90 degrees again so your back is to the door. Move the leg with tubing straight out in front of you. Do 3 sets of 10.   0. Turn your body 90 degrees again so the leg with tubing is closest to the door. Move the leg with tubing across your body. Do 3 sets of 10.   Hold onto a chair if you need help balancing. This exercise can be made even more challenging by standing on a pillow while you move the leg with tubing.   Resisted terminal knee extension: Make a loop from a piece of elastic tubing by tying a knot in both ends. Close both knots in a door. Step into the loop so the tubing is around the back of your injured leg. Lift the other foot off the ground. Hold onto a chair for balance, if needed. Bend the knee on the leg with tubing about 45 degrees. Slowly straighten your leg, keeping your thigh muscle tight as you do this. Do this 10 times. Do 3 sets. An easier way to do this is to stand on both legs for better support while you do the exercise.   Standing calf stretch: Stand facing a wall  with your hands on the wall at about eye level. Keep your injured leg back with your heel on the floor. Keep the other leg forward with the knee bent. Turn your back foot slightly inward (as if you were pigeon-toed). Slowly lean into the wall until you feel a stretch in the back of your calf. Hold the stretch for 15 to 30 seconds. Return to the starting position. Repeat 3 times. Do this exercise several times each day.   Clam exercise: Lie on your uninjured side with your hips and knees bent and feet together. Slowly raise your top leg toward the ceiling while keeping your heels touching each other. Hold for 2 seconds and lower slowly. Do 3 sets of 10 repetitions.   Iliotibial band stretch: Side-bending: Cross one leg in front of the other leg and lean in the opposite direction from the front leg. Reach the arm on the side of the back leg over your head while you do this. Hold this position for 15 to 30 seconds. Return to the starting position. Repeat 3 times and then switch legs and repeat the exercise.   Published by [employer].  This content is reviewed periodically and is subject to change as new health information becomes available. The information is intended to inform and educate and is not a replacement for medical evaluation, advice, diagnosis or treatment by a healthcare professional.   Written by Margarita George MS, PT, and Phoebe Michael PT, [facility], \[facility] [place] [facility]\"", for [facility].   ? 2010 [facility] and/or its affiliates. All Rights Reserved.   Copyright   Clinical Reference Systems 2011  Adult Health Advisor                                Thank you for choosing [facility]  for your Health Care. It was a pleasure seeing you at your visit today. Please contact us with any questions or concerns you may have.                   Helga Ibarra MD                                  To reach your [facility] care team after hours call:   [phone]    Our clinic hours are:      Monday- 7:30 am - 7:00 pm                             Tuesday through Friday- 7:30 am - 5:00 pm                                        Saturday- 8:00 am - 12:00 pm                  Phone:  [phone]    Our pharmacy hours are:     Monday  8:00 am to 7:00 pm      Tuesday through Friday 8:00am to 6:00pm                        Saturday - 9:00 am to 1:00 pm      Sunday : Closed.              Phone:  [phone]      There is also information available at our web site:  [email or web address]    If your provider ordered any lab tests and you do not receive the results within 10 business days, please call the clinic.    If you need a medication refill please contact your pharmacy.  Please allow 2 business days for your refill to be completed.    Our clinic offers telephone visits and e visits.  Please ask one of your team members to explain more.      Use SocialVoltt (secure email communication and access to your chart) to send your primary care provider a message or make an appointment. Ask someone on your Team how to sign up for SocialVoltt.                       Follow-ups after your visit        Additional Services     ORTHO  REFERRAL       [facility] is referring you to the Orthopedic  Services at [facility].       The  Representative will assist you in the coordination of your Orthopedic and Musculoskeletal Care as prescribed by your physician.    The  Representative will call you within 1 business day to help schedule your appointment, or you may contact the  Representative at:    All areas ~ [phone]     Type of Referral : Non Surgical       Timeframe requested: Within 2 weeks    Coverage of these services is subject to the terms and limitations of your health insurance plan.  Please call member services at your health plan with any benefit or coverage questions.      If X-rays, CT or MRI's have been performed, please contact the  facility where they were done to arrange for , prior to your scheduled appointment.  Please bring this referral request to your appointment and present it to your specialist.                  Your next 10 appointments already scheduled     Feb 08, 2018 10:45 AM CST   [facility]    [address] [facility] [address]   [phone]            Apr 25, 2018 10:15 AM CDT   Adult Med Follow UP with FORTINO Reaves CNS   [facility]iat[facility] Clinic ([facility])    [address] [facility] [address] [phone]              Who to contact     If you have questions or need follow up information about today's clinic visit or your schedule please contact [facility] directly at [phone].  Normal or non-critical lab and imaging results will be communicated to you by MyChart, letter or phone within 4 business days after the clinic has received the results. If you do not hear from us within 7 days, please contact the clinic through BitLithart or phone. If you have a critical or abnormal lab result, we will notify you by phone as soon as possible.  Submit refill requests through [employer]utterCal or call your pharmacy and they will forward the refill request to us. Please allow 3 business days for your refill to be completed.          Additional Information About Your Visit        [facility] Information     [employer] gives you secure access to your electronic health record. If you see a primary care provider, you can also send messages to your care team and make appointments. If you have questions, please call your primary care clinic.  If you do not have a primary care provider, please call [phone] and they will assist you.        Care EveryWhere ID     This is your Care EveryWhere ID. This could be used by other organizations to  "access your [place] medical records  [id number]        Your Vitals Were     Pulse Temperature Respirations Height Last Period Pulse Oximetry    92 98.2  F (36.8  C) (Tympanic) 12 5' 9\" (1.753 m) 06/28/2001 98%    Breastfeeding? BMI (Body Mass Index)                No 23.48 kg/m2           Blood Pressure from Last 3 Encounters:   01/29/18 124/72   11/13/17 126/70   10/25/17 163/82    Weight from Last 3 Encounters:   01/29/18 159 lb (72.1 kg)   10/25/17 159 lb 9.6 oz (72.4 kg)   09/14/17 150 lb 9.6 oz (68.3 kg)              We Performed the Following     DEPRESSION ACTION PLAN (DAP)     ORTHO  REFERRAL          Today's Medication Changes          These changes are accurate as of 1/29/18  4:29 PM.  If you have any questions, ask your nurse or doctor.               These medicines have changed or have updated prescriptions.        Dose/Directions    fluticasone 110 MCG/ACT Inhaler   Commonly known as:  FLOVENT HFA   This may have changed:    - when to take this  - reasons to take this  - additional instructions   Used for:  Cough        Dose:  2 puff   Inhale 2 puffs into the lungs 2 times daily 2 puffs   Quantity:  1 Inhaler   Refills:  12       fluticasone 50 MCG/ACT spray   Commonly known as:  FLONASE   This may have changed:    - when to take this  - reasons to take this   Used for:  Acute recurrent maxillary sinusitis        Dose:  1-2 spray   Spray 1-2 sprays into both nostrils daily   Quantity:  1 Bottle   Refills:  11                Primary Care Provider Office Phone # Fax #    Helgamony Ibarra [phone] [address] [facility] [address]        Equal Access to Services     JEZ AVILA AH: Jules Bernardo, waapoorva luqsean, qaybta kaalmada randy, teri galvan. So [facility] [phone].    ATENCIÓN: Si habla español, tiene a christensen disposición servicios gratuitos de asistencia lingüística. Llame al [phone].    We comply " with applicable federal civil rights laws and [place] laws. We do not discriminate on the basis of race, color, national origin, age, disability, sex, sexual orientation, or gender identity.            Thank you!     Thank you for choosing [facility]  for your care. Our goal is always to provide you with excellent care. Hearing back from our patients is one way we can continue to improve our services. Please take a few minutes to complete the written survey that you may receive in the mail after your visit with us. Thank you!             Your Updated Medication List - Protect others around you: Learn how to safely use, store and throw away your medicines at www.disposemymeds.org.          This list is accurate as of 1/29/18  4:29 PM.  Always use your most recent med list.                   Brand Name Dispense Instructions for use Diagnosis    acetaminophen 500 MG tablet    TYLENOL    100 tablet    Take 2 tablets (1,000 mg) by mouth every 6 hours as needed for mild pain    Concussion, with loss of consciousness of 30 minutes or less, initial encounter       albuterol 108 (90 BASE) MCG/ACT Inhaler    PROAIR HFA/PROVENTIL HFA/VENTOLIN HFA    1 Inhaler    Inhale 2 puffs into the lungs every 6 hours as needed for shortness of breath / dyspnea    Cough       ALLEGRA ALLERGY PO      Take by mouth daily        escitalopram 10 MG tablet    LEXAPRO    90 tablet    Take 1 tablet (10 mg) by mouth daily    TIFFANY (generalized anxiety disorder)       fluticasone 110 MCG/ACT Inhaler    FLOVENT HFA    1 Inhaler    Inhale 2 puffs into the lungs 2 times daily 2 puffs    Cough       fluticasone 50 MCG/ACT spray    FLONASE    1 Bottle    Spray 1-2 sprays into both nostrils daily    Acute recurrent maxillary sinusitis       lisinopril 5 MG tablet    PRINIVIL/ZESTRIL    90 tablet    Take 1 tablet (5 mg) by mouth daily    Essential hypertension with goal blood pressure less than 140/90       metroNIDAZOLE 0.75 % cream     METROCREAM    45 g    APPLY TOPICALLY TWO TIMES A DAY TO FACE    Rosacea       mirtazapine 30 MG tablet    REMERON    90 tablet    Take 1 tablet (30 mg) by mouth At Bedtime    TIFFANY (generalized anxiety disorder), Adjustment disorder with mixed anxiety and depressed mood       QUEtiapine 25 MG tablet    SEROQUEL    180 tablet    Take 2 tablets (50 mg) by mouth At Bedtime    Anxiety disorder, unspecified type

## 2018-01-29 NOTE — PROGRESS NOTES
SUBJECTIVE:                                                    Jolynn Haji is a 62 year old female who presents to clinic today for the following health issues:    Joint Pain    Onset: Nov 2017    Description:   Location: left knee- has a baker's cyst in the posterior, but now is having left anterior knee pain with some swelling in the joint area and patellar tendon area.   Character: Dull ache and tightness    Intensity: moderate    Progression of Symptoms: worse    No instability going up stairs or down stairs , but has more pain going down the stairs.     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 Always been a walker since then.     Has a english cocker spaniel who likes to be walked twice daily.     Accompanying Signs & Symptoms:  Other symptoms: weakness of left knee    History:   Previous similar pain: YES      Precipitating factors:   Trauma or overuse: no     Alleviating factors:  Improved by: ice and NSAID - Aleve    Therapies Tried and outcome: Aleve     Patient Active Problem List   Diagnosis     Essential hypertension with goal blood pressure less than 140/90     Hidradenitis     Localized osteoarthritis of hand     Synovial cyst of popliteal space     DERMATOPHYTOSIS OF NAIL- toenails      Lipidosis     Osteopenia     Postmenopausal     NUMBNESS AND TINGLING OF FOOT - bilateral -mild      CARDIOVASCULAR SCREENING; LDL GOAL LESS THAN 160     Numbness of feet- distal feet R>L      Advanced directives, counseling/discussion     Stress incontinence, female - mild     Cough     Acute bronchitis- recurrent- ? related to mold in school building- pt has since retired and no further bronchitis     Rosacea     History of migraine headaches- assoc. with nausea/vomiting - resolved around menopause - were  premenstrual      Dupuytren's disease of palm - right 4th digit flexor tendon     Sun-damaged skin     Seasonal  allergic rhinitis     Basal cell carcinoma of leg, right     Concussion without loss of consciousness     Adjustment disorder with mixed anxiety and depressed mood- mild at this time     Family history of celiac disease     TIFFANY (generalized anxiety disorder)     Insomnia, unspecified type     Chronic constipation     Raynaud's phenomenon without gangrene       Current Outpatient Prescriptions   Medication Sig Dispense Refill     QUEtiapine (SEROQUEL) 25 MG tablet Take 2 tablets (50 mg) by mouth At Bedtime 180 tablet 1     mirtazapine (REMERON) 30 MG tablet Take 1 tablet (30 mg) by mouth At Bedtime 90 tablet 1     escitalopram (LEXAPRO) 10 MG tablet Take 1 tablet (10 mg) by mouth daily 90 tablet 1     lisinopril (PRINIVIL/ZESTRIL) 5 MG tablet Take 1 tablet (5 mg) by mouth daily 90 tablet 1     Fexofenadine HCl (ALLEGRA ALLERGY PO) Take by mouth daily       albuterol (PROAIR HFA/PROVENTIL HFA/VENTOLIN HFA) 108 (90 BASE) MCG/ACT Inhaler Inhale 2 puffs into the lungs every 6 hours as needed for shortness of breath / dyspnea 1 Inhaler 5     acetaminophen (TYLENOL) 500 MG tablet Take 2 tablets (1,000 mg) by mouth every 6 hours as needed for mild pain 100 tablet 0     fluticasone (FLONASE) 50 MCG/ACT spray Spray 1-2 sprays into both nostrils daily (Patient taking differently: Spray 1-2 sprays into both nostrils daily as needed ) 1 Bottle 11     fluticasone (FLOVENT HFA) 110 MCG/ACT inhaler Inhale 2 puffs into the lungs 2 times daily 2 puffs (Patient taking differently: Inhale 2 puffs into the lungs 2 times daily as needed 2 puffs) 1 Inhaler 12     metroNIDAZOLE (METROCREAM) 0.75 % cream APPLY TOPICALLY TWO TIMES A DAY TO FACE 45 g 5          Allergies   Allergen Reactions     Augmentin Itching     itching, rash     Fosamax Diarrhea     Diarrhea, stomach cramps, jaw pain          Problem list and histories reviewed & adjusted, as indicated.  Additional history: as documented    Reviewed and updated as needed this visit by  "clinical staff       Reviewed and updated as needed this visit by Provider         ROS:   ROS: 12 point ROS neg other than the symptoms noted above.     OBJECTIVE:                                                    /72  Pulse 92  Temp 98.2  F (36.8  C) (Tympanic)  Resp 12  Ht 5' 9\" (1.753 m)  Wt 159 lb (72.1 kg)  LMP 06/28/2001  SpO2 98%  Breastfeeding? No  BMI 23.48 kg/m2  Body mass index is 23.48 kg/(m^2).   GENERAL: healthy, alert, well nourished, well hydrated, no distress  HENT: ear canals- normal; TMs- normal; Nose- normal; Mouth- no ulcers, no lesions  NECK: no tenderness, no adenopathy, no asymmetry, no masses, no stiffness; thyroid- normal to palpation  RESP: lungs clear to auscultation - no rales, no rhonchi, no wheezes  CV: regular rates and rhythm, normal S1 S2, no S3 or S4 and no murmur, no click or rub -  ABDOMEN: soft, no tenderness, no  hepatosplenomegaly, no masses, normal bowel sounds  MS: extremities- no gross deformities noted, no edema  Left knee: full range of motion ; no patellar crepitus or tenderness at all, no joint laxity, but positive medial joint line tenderness with mild left medial anterior knee effusion noted.     Diagnostic test results:  Medial knee compartment narrowing noted on the left - consistent with mild osteoarthritis      ASSESSMENT/PLAN:                                                        ICD-10-CM    1. Acute pain of left knee M25.562 XR Knee AP Standing Bilateral     CANCELED: XR Knee Bilateral 1/2 Views   2. Adjustment disorder with mixed anxiety and depressed mood- mild at this time F43.23 DEPRESSION ACTION PLAN (DAP)   3. Left medial knee pain M25.562 ORTHO  REFERRAL   4. Other secondary osteoarthritis of left knee M17.5 ORTHO  REFERRAL     Please, call or return to clinic or go to the ER immediately if signs or symptoms worsen or fail to improve as anticipated. Rest the affected painful area as much as possible.  Apply ice for " 15-20 minutes intermittently as needed and especially after any offending activity. Daily stretching.  As pain recedes, begin normal activities slowly as tolerated.  Consider Physical Therapy if symptoms not better with symptomatic care.     See Patient Instructions         Helga Ibarra MD    [facility]

## 2018-01-29 NOTE — PATIENT INSTRUCTIONS
Patellofemoral Pain Syndrome (Runner's Knee)             What is patellofemoral pain syndrome?   Patellofemoral pain syndrome is pain behind the kneecap. It may also be called patellofemoral disorder, patellar malalignment, runner's knee, and chondromalacia.   How does it occur?   Patellofemoral pain syndrome can occur from overuse of the knee in sports and activities such as running, walking, jumping, or bicycling.   The kneecap (patella) is attached to the large group of muscles in the thigh called the quadriceps. It is also attached to the shin bone by the patellar tendon. The kneecap fits into grooves in the end of the thigh bone (femur) called the femoral condyle. With repeated bending and straightening of the knee, you can irritate the inside surface of the kneecap and cause pain.   Patellofemoral pain syndrome also may result from the way your hips, legs, knees, or feet are aligned. For example, if you have wide hips or underdeveloped thigh muscles, or if you are knock-kneed You may also have this problem if your foot flattens too much when you walk or run (a condition called over-pronation).   What are the symptoms?   The main symptom is pain behind the kneecap. You may have pain when you walk, run, or sit for a long time. The pain is usually worse when you walk downhill or down stairs. Your knee may swell at times. You may feel or hear snapping, popping, or grinding in the knee.   How is it diagnosed?   Your healthcare provider will review your symptoms and examine your knee. You will have knee X-rays. You may have an MRI to check for damage to the surface of the patella or femur or another injury.   How is it treated?   Treatment includes the following:   Put an ice pack, gel pack, or package of frozen vegetables, wrapped in a cloth on the area every 3 to 4 hours, for up to 20 minutes at a time.   Raise the knee on a pillow when you sit or lie down.   Take an anti-inflammatory medicine such  as ibuprofen, or other medicine as directed by your provider. Nonsteroidal anti-inflammatory medicines (NSAIDs) may cause stomach bleeding and other problems. These risks increase with age. Read the label and take as directed. Unless recommended by your healthcare provider, do not take for more than 10 days.   Follow your provider's instructions for doing exercises to help you recover. Your healthcare provider will show you exercises to help decrease the pain behind your kneecap.   If you over-pronate, your healthcare provider may recommend shoe inserts, called orthotics. You can buy orthotics at a pharmacy or athletic shoe store or they can be custom-made.   Use an infrapatellar strap, a strap placed below the kneecap over the patellar tendon.   Wear a neoprene knee sleeve, which will give support to your knee and patella.   While you recover from your injury, you will need to change your sport or activity to one that does not make your condition worse. For example, you may need to bicycle or swim instead of run.   In cases of severe patellofemoral pain syndrome, surgery may be recommended.   How long will the effects last?   Patellofemoral pain often lasts a long time and can come back after symptoms were better for a while. Treatment requires proper rehabilitation exercises that are done regularly.   When can I return to my normal activities?   Everyone recovers from an injury at a different rate. Return to your activities depends on how soon your knee recovers, not by how many days or weeks it has been since your injury has occurred. In general, the longer you have symptoms before you start treatment, the longer it will take to get better. The goal is to return you to your normal activities as soon as is safely possible. If you return too soon you may worsen your injury.   You may safely return to your normal activities when, starting from the top of the list and progressing to the end, each of the following is  true:   Your injured knee can be fully straightened and bent without pain.   Your knee and leg have regained normal strength compared to the uninjured knee and leg.   You are able to walk, bend, and squat without pain.   How can I prevent runner's knee?   Runner's knee can best be prevented by strengthening your thigh muscles, particularly the inside part of this muscle group. It is also important to wear shoes that fit well and that have good arch supports.     Published by [employer].  This content is reviewed periodically and is subject to change as new health information becomes available. The information is intended to inform and educate and is not a replacement for medical evaluation, advice, diagnosis or treatment by a healthcare professional.   Written by Orion Aleman MD, for [employer]   ? 2010 [facility] and/or its affiliates. All Rights Reserved.   Copyright   [employer] Reference Systems 2011  Adult Health Advisor    Patellofemoral Pain Syndrome (Runner's Knee) Rehabilitation Exercises              You can do the hamstring stretch right away. When the pain in your knee has decreased, you can do the quadriceps stretch and start strengthening the thigh muscles using the rest of the exercises.   Standing hamstring stretch: Put the heel of the leg on your injured side on a stool about 15 inches high. Keep your leg straight. Lean forward, bending at the hips, until you feel a mild stretch in the back of your thigh. Make sure you don't roll your shoulders or bend at the waist when doing this or you will stretch your lower back instead of your leg. Hold the stretch for 15 to 30 seconds. Repeat 3 times.   Quadriceps stretch: Stand an arm's length away from the wall with your injured leg farthest from the wall. Facing straight ahead, brace yourself by keeping one hand against the wall. With your other hand, grasp the ankle of your injured leg and pull your heel toward your buttocks. Don't arch or twist your  back. Keep your knees together. Hold this stretch for 15 to 30 seconds.   Side-lying leg lift: Lie on your uninjured side. Tighten the front thigh muscles on your injured leg and lift that leg 8 to 10 inches away from the other leg. Keep the leg straight and lower it slowly. Do 3 sets of 10.   Quad sets: Sit on the floor with your injured leg straight and your other leg bent. Press the back of the knee of your injured leg against the floor by tightening the muscles on the top of your thigh. Hold this position 10 seconds. Relax. Do 3 sets of 10.   Straight leg raise: Lie on your back with your legs straight out in front of you. Bend the knee on your uninjured side and place the foot flat on the floor. Tighten the thigh muscle on your injured side and lift your leg about 8 inches off the floor. Keep your leg straight and your thigh muscle tight. Slowly lower your leg back down to the floor. Do 3 sets of 10.   Step-up: Stand with the foot of your injured leg on a support 3 to 5 inches high (like a small step or block of wood). Keep your other foot flat on the floor. Shift your weight onto the injured leg on the support. Straighten your injured leg as the other leg comes off the floor. Return to the starting position by bending your injured leg and slowly lowering your uninjured leg back to the floor. Do 3 sets of 10.   Wall squat with a ball: Stand with your back, shoulders, and head against a wall. Look straight ahead. Keep your shoulders relaxed and your feet 3 feet from the wall and shoulder's width apart. Place a soccer or basketball-sized ball behind your back. Keeping your back against the wall, slowly squat down to a 45-degree angle. Your thighs will not yet be parallel to the floor. Hold this position for 10 seconds and then slowly slide back up the wall. Repeat 10 times. Build up to 3 sets of 10.   Knee stabilization: Wrap a piece of elastic tubing around the ankle of the uninjured leg. Tie a knot in the other  end of the tubing and close it in a door.   0. Stand facing the door on the leg without tubing and bend your knee slightly, keeping your thigh muscles tight. While maintaining this position, move the leg with the tubing straight back behind you. Do 3 sets of 10.   0. Turn 90 degrees so the leg without tubing is closest to the door. Move the leg with tubing away from your body. Do 3 sets of 10.   0. Turn 90 degrees again so your back is to the door. Move the leg with tubing straight out in front of you. Do 3 sets of 10.   0. Turn your body 90 degrees again so the leg with tubing is closest to the door. Move the leg with tubing across your body. Do 3 sets of 10.   Hold onto a chair if you need help balancing. This exercise can be made even more challenging by standing on a pillow while you move the leg with tubing.   Resisted terminal knee extension: Make a loop from a piece of elastic tubing by tying a knot in both ends. Close both knots in a door. Step into the loop so the tubing is around the back of your injured leg. Lift the other foot off the ground. Hold onto a chair for balance, if needed. Bend the knee on the leg with tubing about 45 degrees. Slowly straighten your leg, keeping your thigh muscle tight as you do this. Do this 10 times. Do 3 sets. An easier way to do this is to stand on both legs for better support while you do the exercise.   Standing calf stretch: Stand facing a wall with your hands on the wall at about eye level. Keep your injured leg back with your heel on the floor. Keep the other leg forward with the knee bent. Turn your back foot slightly inward (as if you were pigeon-toed). Slowly lean into the wall until you feel a stretch in the back of your calf. Hold the stretch for 15 to 30 seconds. Return to the starting position. Repeat 3 times. Do this exercise several times each day.   Clam exercise: Lie on your uninjured side with your hips and knees bent and feet together. Slowly raise your  top leg toward the ceiling while keeping your heels touching each other. Hold for 2 seconds and lower slowly. Do 3 sets of 10 repetitions.   Iliotibial band stretch: Side-bending: Cross one leg in front of the other leg and lean in the opposite direction from the front leg. Reach the arm on the side of the back leg over your head while you do this. Hold this position for 15 to 30 seconds. Return to the starting position. Repeat 3 times and then switch legs and repeat the exercise.   Published by [employer].  This content is reviewed periodically and is subject to change as new health information becomes available. The information is intended to inform and educate and is not a replacement for medical evaluation, advice, diagnosis or treatment by a healthcare professional.   Written by Margarita George MS, PT, and Phoebe Michael PT, [facility], [facility], for [facility].   ? 2010 [facility] and/or its affiliates. All Rights Reserved.   Copyright   [employer] Reference Systems 2011  Adult Health Advisor                                Thank you for choosing [facility]  for your Health Care. It was a pleasure seeing you at your visit today. Please contact us with any questions or concerns you may have.                   Helga Ibarra MD                                  To reach your [facility] care team after hours call:   [phone]    Our clinic hours are:     Monday- 7:30 am - 7:00 pm                             Tuesday through Friday- 7:30 am - 5:00 pm                                        Saturday- 8:00 am - 12:00 pm                  Phone:  [phone]    Our pharmacy hours are:     Monday  8:00 am to 7:00 pm      Tuesday through Friday 8:00am to 6:00pm                        Saturday - 9:00 am to 1:00 pm      Sunday : Closed.              Phone:  [phone]      There is also information available at our web site:  [email or web address]    If your provider ordered any lab tests and  you do not receive the results within 10 business days, please call the clinic.    If you need a medication refill please contact your pharmacy.  Please allow 2 business days for your refill to be completed.    Our clinic offers telephone visits and e visits.  Please ask one of your team members to explain more.      Use [email or web address] (secure email communication and access to your chart) to send your primary care provider a message or make an appointment. Ask someone on your Team how to sign up for [email or web address].

## 2018-02-08 ENCOUNTER — OFFICE VISIT (OUTPATIENT)
Dept: DERMATOLOGY | Facility: CLINIC | Age: 63
End: 2018-02-08
Payer: COMMERCIAL

## 2018-02-08 VITALS — OXYGEN SATURATION: 98 % | SYSTOLIC BLOOD PRESSURE: 139 MMHG | DIASTOLIC BLOOD PRESSURE: 84 MMHG | HEART RATE: 84 BPM

## 2018-02-08 DIAGNOSIS — L82.1 SK (SEBORRHEIC KERATOSIS): ICD-10-CM

## 2018-02-08 DIAGNOSIS — D18.00 ANGIOMA: ICD-10-CM

## 2018-02-08 DIAGNOSIS — Z85.828 HISTORY OF SKIN CANCER: Primary | ICD-10-CM

## 2018-02-08 DIAGNOSIS — L81.4 LENTIGO: ICD-10-CM

## 2018-02-08 PROCEDURE — 99213 OFFICE O/P EST LOW 20 MIN: CPT | Performed by: DERMATOLOGY

## 2018-02-08 NOTE — NURSING NOTE
"Chief Complaint   Patient presents with     Skin Check       Initial /84  Pulse 84  LMP 06/28/2001  SpO2 98% Estimated body mass index is 23.48 kg/(m^2) as calculated from the following:    Height as of 1/29/18: 1.753 m (5' 9\").    Weight as of 1/29/18: 72.1 kg (159 lb).  Medication Reconciliation: complete    "

## 2018-02-08 NOTE — LETTER
2018         RE: Jolynn Haji  [address] [facility] [place] [address]        Dear Colleague,    Thank you for referring your patient, Jolynn Haji, to the [facility]. Please see a copy of my visit note below.    Jolynn Haji is a 62 year old year old female patient here today for f/u hjx of basal cell carcinoma,.  Today she notes spot on left arm.  .  Patient states this has been present for a while.  Patient reports the following symptoms:  none .  Patient reports the following previous treatments none.  Patient reports the following modifying factors none.  Associated symptoms: none.  Patient has no other skin complaints today.  Remainder of the HPI, Meds, PMH, Allergies, FH, and SH was reviewed in chart.    Pertinent Hx:   Non-melanoma skin cancer   Past Medical History:   Diagnosis Date     Abnormal glandular Papanicolaou smear of cervix      Abnormal glandular Papanicolaou smear of cervix- ASCUS in 2003    ASC H- 2000     Basal cell carcinoma      Basal cell carcinoma of leg, right 2016     Concussion without loss of consciousness 2017     Hidradenitis     left  groin     HSIL (high grade squamous intraepithelial lesion) on Pap smear of cervix 2000    Normal paps from  to /     Hypertension goal BP (blood pressure) < 140/90      OA (osteoarthritis) of knee     moderate     Postmenopausal since 2008      Shingles 2010    left      Synovial cyst of popliteal space        Past Surgical History:   Procedure Laterality Date     C NONSPECIFIC PROCEDURE      Colposcopy        Family History   Problem Relation Age of Onset     Thyroid Disease Mother      Graves disease     Hypertension Mother      DIABETES Mother      type 2     Blood Disease Mother       from leukemia at age 78     DIABETES Daughter      HEART DISEASE Father 62     MV problem with CHF  at 62/ from MI     Hypertension Father      Cardiovascular Father       CANCER Maternal Uncle      type ??     CANCER Maternal Uncle      liver       Social History     Social History     Marital status:      Spouse name: Zach     Number of children: 2     Years of education: 16     Occupational History     teacher first grade -retired at age 60      [employer]     Social History Main Topics     Smoking status: Never Smoker     Smokeless tobacco: Never Used     Alcohol use 12.0 oz/week      Comment: 1 per month     Drug use: No      Comment: no herbal meds either     Sexual activity: Yes     Partners: Male     Birth control/ protection: Surgical      Comment: -was on DepoProvera since 2000-2/2008 -  had vasectomy     Other Topics Concern      Service No     Blood Transfusions No     Caffeine Concern No     Occupational Exposure No     Hobby Hazards No     Sleep Concern No     Stress Concern No     Weight Concern No     Special Diet No     Back Care No     Exercise Yes     regular      Bike Helmet No     Seat Belt Yes     always     Self-Exams Yes     SBE  encouraged monthly     Parent/Sibling W/ Cabg, Mi Or Angioplasty Before 65f 55m? No     Social History Narrative    calcium - 3 +dairy servings/day and/or calcium supplement daily     flex sig/colonoscopy -referred 11/05 - was postponed by [place] [facility] - will pt another referral  3/14/06, didn't go - referred again 6/27/07 = normal.     sun precautions - discussed    mammogram - yearly     Td booster -1995, 2/14/05     pneumovax -at age 60-65    DEXA -normal 6/2005- recheck this year 2008 - referral given 2/2008    stool hemoccults - every year after age 40    ASA- start 81mg asa qday    mulvitamin - encouraged    Last menses about 7/08.         no hot flashes- 2005 - was on Depoprovera 2000- 2/2008        Outpatient Encounter Prescriptions as of 2/8/2018   Medication Sig Dispense Refill     QUEtiapine (SEROQUEL) 25 MG tablet Take 2 tablets (50 mg) by mouth At  Bedtime 180 tablet 1     mirtazapine (REMERON) 30 MG tablet Take 1 tablet (30 mg) by mouth At Bedtime 90 tablet 1     escitalopram (LEXAPRO) 10 MG tablet Take 1 tablet (10 mg) by mouth daily 90 tablet 1     Fexofenadine HCl (ALLEGRA ALLERGY PO) Take by mouth daily       lisinopril (PRINIVIL/ZESTRIL) 5 MG tablet Take 1 tablet (5 mg) by mouth daily 90 tablet 1     albuterol (PROAIR HFA/PROVENTIL HFA/VENTOLIN HFA) 108 (90 BASE) MCG/ACT Inhaler Inhale 2 puffs into the lungs every 6 hours as needed for shortness of breath / dyspnea 1 Inhaler 5     acetaminophen (TYLENOL) 500 MG tablet Take 2 tablets (1,000 mg) by mouth every 6 hours as needed for mild pain 100 tablet 0     fluticasone (FLONASE) 50 MCG/ACT spray Spray 1-2 sprays into both nostrils daily (Patient taking differently: Spray 1-2 sprays into both nostrils daily as needed ) 1 Bottle 11     fluticasone (FLOVENT HFA) 110 MCG/ACT inhaler Inhale 2 puffs into the lungs 2 times daily 2 puffs (Patient taking differently: Inhale 2 puffs into the lungs 2 times daily as needed 2 puffs) 1 Inhaler 12     metroNIDAZOLE (METROCREAM) 0.75 % cream APPLY TOPICALLY TWO TIMES A DAY TO FACE 45 g 5     No facility-administered encounter medications on file as of 2/8/2018.              Review Of Systems  Skin: As above  Eyes: negative  Ears/Nose/Throat: negative  Respiratory: No shortness of breath, dyspnea on exertion, cough, or hemoptysis  Cardiovascular: negative  Gastrointestinal: negative  Genitourinary: negative  Musculoskeletal: negative  Neurologic: negative  Psychiatric: negative  Hematologic/Lymphatic/Immunologic: negative  Endocrine: negative      O:   NAD, WDWN, Alert & Oriented, Mood & Affect wnl, Vitals stable   Here today alone   /84  Pulse 84  LMP 06/28/2001  SpO2 98%   General appearance normal   Vitals stable   Alert, oriented and in no acute distress      Following lymph nodes palpated: Occipital, Cervical, Supraclavicular no lad   Stuck on papules and  brown macules on trunk and ext    L arm inflamed seborrheic keratosis    Leg well healed   Red papules on trunk         The remainder of the full exam was unremarkable; the following areas were examined:  conjunctiva/lids, oral mucosa, neck, peripheral vascular system, abdomen, lymph nodes, digits/nails, eccrine and apocrine glands, scalp/hair, face, neck, chest, abdomen, buttocks, back, RUE, LUE, RLE, LLE       Eyes: Conjunctivae/lids:Normal     ENT: Lips, buccal mucosa, tongue: normal    MSK:Normal    Cardiovascular: peripheral edema none    Pulm: Breathing Normal    Lymph Nodes: No Head and Neck Lymphadenopathy     Neuro/Psych: Orientation:Normal; Mood/Affect:Normal      A/P:  1. Hx of non-melanoma skin cancer, seborrheic keratosis, letnigo, angioma    BENIGN LESIONS DISCUSSED WITH PATIENT:  I discussed the specifics of tumor, prognosis, and genetics of benign lesions.  I explained that treatment of these lesions would be purely cosmetic and not medically neccessary.  I discussed with patient different removal options including excision, cautery and /or laser.      Nature and genetics of benign skin lesions dicussed with patient.  Signs and Symptoms of skin cancer discussed with patient.  ABCDEs of melanoma reviewed with patient.  Patient encouraged to perform monthly skin exams.  UV precautions reviewed with patient.  Skin care regimen reviewed with patient: Eliminate harsh soaps, i.e. Dial, zest, irsih spring; Mild soaps such as Cetaphil or Dove sensitive skin, avoid hot or cold showers, aggressive use of emollients including vanicream, cetaphil or cerave discussed with patient.    Risks of non-melanoma skin cancer discussed with patient   Return to clinic 12 months      Again, thank you for allowing me to participate in the care of your patient.        Sincerely,        Sheldon Rogers MD

## 2018-02-08 NOTE — PROGRESS NOTES
Jolynn Haji is a 62 year old year old female patient here today for f/u hjx of basal cell carcinoma,.  Today she notes spot on left arm.  .  Patient states this has been present for a while.  Patient reports the following symptoms:  none .  Patient reports the following previous treatments none.  Patient reports the following modifying factors none.  Associated symptoms: none.  Patient has no other skin complaints today.  Remainder of the HPI, Meds, PMH, Allergies, FH, and SH was reviewed in chart.    Pertinent Hx:   Non-melanoma skin cancer   Past Medical History:   Diagnosis Date     Abnormal glandular Papanicolaou smear of cervix      Abnormal glandular Papanicolaou smear of cervix- ASCUS in 2003    ASC H-      Basal cell carcinoma      Basal cell carcinoma of leg, right 2016     Concussion without loss of consciousness 2017     Hidradenitis     left  groin     HSIL (high grade squamous intraepithelial lesion) on Pap smear of cervix 2000    Normal paps from  to /cc     Hypertension goal BP (blood pressure) < 140/90      OA (osteoarthritis) of knee     moderate     Postmenopausal since 2008      Shingles 2010    left      Synovial cyst of popliteal space        Past Surgical History:   Procedure Laterality Date     C NONSPECIFIC PROCEDURE      Colposcopy        Family History   Problem Relation Age of Onset     Thyroid Disease Mother      Graves disease     Hypertension Mother      DIABETES Mother      type 2     Blood Disease Mother       from leukemia at age 78     DIABETES Daughter      HEART DISEASE Father 62     MV problem with CHF  at 62/ from MI     Hypertension Father      Cardiovascular Father      CANCER Maternal Uncle      type ??     CANCER Maternal Uncle      liver       Social History     Social History     Marital status:      Spouse name: Zach     Number of children: 2     Years of education: 16     Occupational History      teacher first grade -retired at age 60      [employer]     Social History Main Topics     Smoking status: Never Smoker     Smokeless tobacco: Never Used     Alcohol use 12.0 oz/week      Comment: 1 per month     Drug use: No      Comment: no herbal meds either     Sexual activity: Yes     Partners: Male     Birth control/ protection: Surgical      Comment: -was on DepoProvera since 2000-2/2008 -  had vasectomy     Other Topics Concern      Service No     Blood Transfusions No     Caffeine Concern No     Occupational Exposure No     Hobby Hazards No     Sleep Concern No     Stress Concern No     Weight Concern No     Special Diet No     Back Care No     Exercise Yes     regular      Bike Helmet No     Seat Belt Yes     always     Self-Exams Yes     SBE  encouraged monthly     Parent/Sibling W/ Cabg, Mi Or Angioplasty Before 65f 55m? No     Social History Narrative    calcium - 3 +dairy servings/day and/or calcium supplement daily     flex sig/colonoscopy -referred 11/05 - was postponed by [place] [facility] - will pt another referral  3/14/06, didn't go - referred again 6/27/07 = normal.     sun precautions - discussed    mammogram - yearly     Td booster -1995, 2/14/05     pneumovax -at age 60-65    DEXA -normal 6/2005- recheck this year 2008 - referral given 2/2008    stool hemoccults - every year after age 40    ASA- start 81mg asa qday    mulvitamin - encouraged    Last menses about 7/08.         no hot flashes- 2005 - was on Depoprovera 2000- 2/2008        Outpatient Encounter Prescriptions as of 2/8/2018   Medication Sig Dispense Refill     QUEtiapine (SEROQUEL) 25 MG tablet Take 2 tablets (50 mg) by mouth At Bedtime 180 tablet 1     mirtazapine (REMERON) 30 MG tablet Take 1 tablet (30 mg) by mouth At Bedtime 90 tablet 1     escitalopram (LEXAPRO) 10 MG tablet Take 1 tablet (10 mg) by mouth daily 90 tablet 1     Fexofenadine HCl (ALLEGRA ALLERGY PO) Take  by mouth daily       lisinopril (PRINIVIL/ZESTRIL) 5 MG tablet Take 1 tablet (5 mg) by mouth daily 90 tablet 1     albuterol (PROAIR HFA/PROVENTIL HFA/VENTOLIN HFA) 108 (90 BASE) MCG/ACT Inhaler Inhale 2 puffs into the lungs every 6 hours as needed for shortness of breath / dyspnea 1 Inhaler 5     acetaminophen (TYLENOL) 500 MG tablet Take 2 tablets (1,000 mg) by mouth every 6 hours as needed for mild pain 100 tablet 0     fluticasone (FLONASE) 50 MCG/ACT spray Spray 1-2 sprays into both nostrils daily (Patient taking differently: Spray 1-2 sprays into both nostrils daily as needed ) 1 Bottle 11     fluticasone (FLOVENT HFA) 110 MCG/ACT inhaler Inhale 2 puffs into the lungs 2 times daily 2 puffs (Patient taking differently: Inhale 2 puffs into the lungs 2 times daily as needed 2 puffs) 1 Inhaler 12     metroNIDAZOLE (METROCREAM) 0.75 % cream APPLY TOPICALLY TWO TIMES A DAY TO FACE 45 g 5     No facility-administered encounter medications on file as of 2/8/2018.              Review Of Systems  Skin: As above  Eyes: negative  Ears/Nose/Throat: negative  Respiratory: No shortness of breath, dyspnea on exertion, cough, or hemoptysis  Cardiovascular: negative  Gastrointestinal: negative  Genitourinary: negative  Musculoskeletal: negative  Neurologic: negative  Psychiatric: negative  Hematologic/Lymphatic/Immunologic: negative  Endocrine: negative      O:   NAD, WDWN, Alert & Oriented, Mood & Affect wnl, Vitals stable   Here today alone   /84  Pulse 84  LMP 06/28/2001  SpO2 98%   General appearance normal   Vitals stable   Alert, oriented and in no acute distress      Following lymph nodes palpated: Occipital, Cervical, Supraclavicular no lad   Stuck on papules and brown macules on trunk and ext    L arm inflamed seborrheic keratosis    Leg well healed   Red papules on trunk         The remainder of the full exam was unremarkable; the following areas were examined:  conjunctiva/lids, oral mucosa, neck, peripheral  vascular system, abdomen, lymph nodes, digits/nails, eccrine and apocrine glands, scalp/hair, face, neck, chest, abdomen, buttocks, back, RUE, LUE, RLE, LLE       Eyes: Conjunctivae/lids:Normal     ENT: Lips, buccal mucosa, tongue: normal    MSK:Normal    Cardiovascular: peripheral edema none    Pulm: Breathing Normal    Lymph Nodes: No Head and Neck Lymphadenopathy     Neuro/Psych: Orientation:Normal; Mood/Affect:Normal      A/P:  1. Hx of non-melanoma skin cancer, seborrheic keratosis, letnigo, angioma    BENIGN LESIONS DISCUSSED WITH PATIENT:  I discussed the specifics of tumor, prognosis, and genetics of benign lesions.  I explained that treatment of these lesions would be purely cosmetic and not medically neccessary.  I discussed with patient different removal options including excision, cautery and /or laser.      Nature and genetics of benign skin lesions dicussed with patient.  Signs and Symptoms of skin cancer discussed with patient.  ABCDEs of melanoma reviewed with patient.  Patient encouraged to perform monthly skin exams.  UV precautions reviewed with patient.  Skin care regimen reviewed with patient: Eliminate harsh soaps, i.e. Dial, zest, irsih spring; Mild soaps such as Cetaphil or Dove sensitive skin, avoid hot or cold showers, aggressive use of emollients including vanicream, cetaphil or cerave discussed with patient.    Risks of non-melanoma skin cancer discussed with patient   Return to clinic 12 months

## 2018-02-08 NOTE — MR AVS SNAPSHOT
After Visit Summary   2/8/2018    Jolynn Haji    MRN: [id number]           Patient Information     Date Of Birth          1955        Visit Information        Provider Department      2/8/2018 10:45 AM Sheldon Rogers MD [facility]        Today's Diagnoses     History of skin cancer    -  1    Lentigo        SK (seborrheic keratosis)        Angioma           Follow-ups after your visit        Your next 10 appointments already scheduled     Feb 19, 2018 11:00 AM CST   New Visit with Jimmie De León MD   [facility] ([facility])    [address] [facility]  [address]  [facility] [address]   [phone]            Apr 25, 2018 10:15 AM CDT   Adult Med Follow UP with FORTINO Reaves CNS   [facility] ([facility])    [address] [address] [address] [facility] [address] [phone]              Who to contact     If you have questions or need follow up information about today's clinic visit or your schedule please contact [facility] directly at [phone].  Normal or non-critical lab and imaging results will be communicated to you by MyChart, letter or phone within 4 business days after the clinic has received the results. If you do not hear from us within 7 days, please contact the clinic through [employer] or phone. If you have a critical or abnormal lab result, we will notify you by phone as soon as possible.  Submit refill requests through [employer] or call your pharmacy and they will forward the refill request to us. Please allow 3 business days for your refill to be completed.          Additional Information About Your Visit        MyChart Information     [employer] gives you secure access to your electronic health record. If you see a primary care provider, you can also send messages to your care team and make appointments. If you have  questions, please call your primary care clinic.  If you do not have a primary care provider, please call [phone] and they will assist you.        Care EveryWhere ID     This is your Care EveryWhere ID. This could be used by other organizations to access your [facility] medical records  [id number]        Your Vitals Were     Pulse Last Period Pulse Oximetry             84 06/28/2001 98%          Blood Pressure from Last 3 Encounters:   02/08/18 139/84   01/29/18 124/72   11/13/17 126/70    Weight from Last 3 Encounters:   01/29/18 72.1 kg (159 lb)   09/14/17 68.3 kg (150 lb 9.6 oz)   07/05/17 65.6 kg (144 lb 9.6 oz)              Today, you had the following     No orders found for display         Today's Medication Changes          These changes are accurate as of 2/8/18 10:56 AM.  If you have any questions, ask your nurse or doctor.               These medicines have changed or have updated prescriptions.        Dose/Directions    fluticasone 110 MCG/ACT Inhaler   Commonly known as:  FLOVENT HFA   This may have changed:    - when to take this  - reasons to take this  - additional instructions   Used for:  Cough        Dose:  2 puff   Inhale 2 puffs into the lungs 2 times daily 2 puffs   Quantity:  1 Inhaler   Refills:  12       fluticasone 50 MCG/ACT spray   Commonly known as:  FLONASE   This may have changed:    - when to take this  - reasons to take this   Used for:  Acute recurrent maxillary sinusitis        Dose:  1-2 spray   Spray 1-2 sprays into both nostrils daily   Quantity:  1 Bottle   Refills:  11                Primary Care Provider Office Phone # Fax #    Helgamony Ibarra [phone] [address] [facility] [address]        Equal Access to Services     JEZ AVILA AH: Jules Bernardo, mary carvalho, teri donaldson. So [facility] [phone].    ATENCIÓN: Si habla español, tiene a christensen disposición  servicios gratuitos de asistencia lingüística. Vanita schuster [phone].    We comply with applicable federal civil rights laws and [place] laws. We do not discriminate on the basis of race, color, national origin, age, disability, sex, sexual orientation, or gender identity.            Thank you!     Thank you for choosing [facility]  for your care. Our goal is always to provide you with excellent care. Hearing back from our patients is one way we can continue to improve our services. Please take a few minutes to complete the written survey that you may receive in the mail after your visit with us. Thank you!             Your Updated Medication List - Protect others around you: Learn how to safely use, store and throw away your medicines at www.disposemymeds.org.          This list is accurate as of 2/8/18 10:56 AM.  Always use your most recent med list.                   Brand Name Dispense Instructions for use Diagnosis    acetaminophen 500 MG tablet    TYLENOL    100 tablet    Take 2 tablets (1,000 mg) by mouth every 6 hours as needed for mild pain    Concussion, with loss of consciousness of 30 minutes or less, initial encounter       albuterol 108 (90 BASE) MCG/ACT Inhaler    PROAIR HFA/PROVENTIL HFA/VENTOLIN HFA    1 Inhaler    Inhale 2 puffs into the lungs every 6 hours as needed for shortness of breath / dyspnea    Cough       ALLEGRA ALLERGY PO      Take by mouth daily        escitalopram 10 MG tablet    LEXAPRO    90 tablet    Take 1 tablet (10 mg) by mouth daily    TIFFANY (generalized anxiety disorder)       fluticasone 110 MCG/ACT Inhaler    FLOVENT HFA    1 Inhaler    Inhale 2 puffs into the lungs 2 times daily 2 puffs    Cough       fluticasone 50 MCG/ACT spray    FLONASE    1 Bottle    Spray 1-2 sprays into both nostrils daily    Acute recurrent maxillary sinusitis       lisinopril 5 MG tablet    PRINIVIL/ZESTRIL    90 tablet    Take 1 tablet (5 mg) by mouth daily    Essential  hypertension with goal blood pressure less than 140/90       metroNIDAZOLE 0.75 % cream    METROCREAM    45 g    APPLY TOPICALLY TWO TIMES A DAY TO FACE    Rosacea       mirtazapine 30 MG tablet    REMERON    90 tablet    Take 1 tablet (30 mg) by mouth At Bedtime    TIFFANY (generalized anxiety disorder), Adjustment disorder with mixed anxiety and depressed mood       QUEtiapine 25 MG tablet    SEROQUEL    180 tablet    Take 2 tablets (50 mg) by mouth At Bedtime    Anxiety disorder, unspecified type

## 2018-02-19 ENCOUNTER — OFFICE VISIT (OUTPATIENT)
Dept: ORTHOPEDICS | Facility: CLINIC | Age: 63
End: 2018-02-19
Payer: COMMERCIAL

## 2018-02-19 VITALS — RESPIRATION RATE: 12 BRPM | BODY MASS INDEX: 23.55 KG/M2 | WEIGHT: 159 LBS | HEIGHT: 69 IN

## 2018-02-19 DIAGNOSIS — M17.12 PRIMARY LOCALIZED OSTEOARTHROSIS OF LEFT LOWER LEG: Primary | ICD-10-CM

## 2018-02-19 PROCEDURE — 20610 DRAIN/INJ JOINT/BURSA W/O US: CPT | Mod: LT | Performed by: FAMILY MEDICINE

## 2018-02-19 PROCEDURE — 99243 OFF/OP CNSLTJ NEW/EST LOW 30: CPT | Mod: 25 | Performed by: FAMILY MEDICINE

## 2018-02-19 RX ORDER — METHYLPREDNISOLONE ACETATE 40 MG/ML
40 INJECTION, SUSPENSION INTRA-ARTICULAR; INTRALESIONAL; INTRAMUSCULAR; SOFT TISSUE ONCE
Qty: 1 ML | Refills: 0 | OUTPATIENT
Start: 2018-02-19 | End: 2018-02-19

## 2018-02-19 NOTE — MR AVS SNAPSHOT
After Visit Summary   2/19/2018    Jolynn Haji    MRN: [id number]           Patient Information     Date Of Birth          1955        Visit Information        Provider Department      2/19/2018 11:00 AM Jimmie De León MD [facility]        Today's Diagnoses     Primary localized osteoarthrosis of left lower leg    -  1       Follow-ups after your visit        Your next 10 appointments already scheduled     Apr 25, 2018 10:15 AM CDT   Adult Med Follow UP with FORTINO Reaves CNS   Psychiatry Clinic ([facility])    [address]  [address] [place] [address] [phone]              Who to contact     If you have questions or need follow up information about today's clinic visit or your schedule please contact [facility] directly at [phone].  Normal or non-critical lab and imaging results will be communicated to you by MyChart, letter or phone within 4 business days after the clinic has received the results. If you do not hear from us within 7 days, please contact the clinic through Mobilygenhart or phone. If you have a critical or abnormal lab result, we will notify you by phone as soon as possible.  Submit refill requests through One2start or call your pharmacy and they will forward the refill request to us. Please allow 3 business days for your refill to be completed.          Additional Information About Your Visit        MyChart Information     One2start gives you secure access to your electronic health record. If you see a primary care provider, you can also send messages to your care team and make appointments. If you have questions, please call your primary care clinic.  If you do not have a primary care provider, please call [phone] and they will assist you.        Care EveryWhere ID     This is your Care EveryWhere ID. This could be used by other organizations to access your  "[facility] medical records  [id number]        Your Vitals Were     Respirations Height Last Period BMI (Body Mass Index)          12 5' 9\" (1.753 m) 06/28/2001 23.48 kg/m2         Blood Pressure from Last 3 Encounters:   02/08/18 139/84   01/29/18 124/72   11/13/17 126/70    Weight from Last 3 Encounters:   02/19/18 159 lb (72.1 kg)   01/29/18 159 lb (72.1 kg)   09/14/17 150 lb 9.6 oz (68.3 kg)              We Performed the Following     DRAIN/INJECT LARGE JOINT/BURSA     METHYLPREDNISOLONE 40 MG INJ          Today's Medication Changes          These changes are accurate as of 2/19/18 12:04 PM.  If you have any questions, ask your nurse or doctor.               Start taking these medicines.        Dose/Directions    methylPREDNISolone acetate 40 MG/ML injection   Commonly known as:  DEPO-MEDROL   Used for:  Primary localized osteoarthrosis of left lower leg   Started by:  Jimmie De León MD        Dose:  40 mg   1 mL (40 mg) by INTRA-ARTICULAR route once for 1 dose   Quantity:  1 mL   Refills:  0         These medicines have changed or have updated prescriptions.        Dose/Directions    fluticasone 110 MCG/ACT Inhaler   Commonly known as:  FLOVENT HFA   This may have changed:    - when to take this  - reasons to take this  - additional instructions   Used for:  Cough        Dose:  2 puff   Inhale 2 puffs into the lungs 2 times daily 2 puffs   Quantity:  1 Inhaler   Refills:  12       fluticasone 50 MCG/ACT spray   Commonly known as:  FLONASE   This may have changed:    - when to take this  - reasons to take this   Used for:  Acute recurrent maxillary sinusitis        Dose:  1-2 spray   Spray 1-2 sprays into both nostrils daily   Quantity:  1 Bottle   Refills:  11            Where to get your medicines      Some of these will need a paper prescription and others can be bought over the counter.  Ask your nurse if you have questions.     You don't need a prescription for these medications     " methylPREDNISolone acetate 40 MG/ML injection                Primary Care Provider Office Phone # Fax #    Helga Ibarra [phone]       [address] [place] [address]        Equal Access to Services     CANDI AVILA : Hadhelen jimena ku jauno Soemiliaali, waaxda luqadaha, qaybta kaalmada adedimitryyada, teri wangn kaden vu javier galvan. So [facility] [phone].    ATENCIÓN: Si habla español, tiene a christensen disposición servicios gratuitos de asistencia lingüística. Llame al [phone].    We comply with applicable federal civil rights laws and [place] laws. We do not discriminate on the basis of race, color, national origin, age, disability, sex, sexual orientation, or gender identity.            Thank you!     Thank you for choosing [facility]  for your care. Our goal is always to provide you with excellent care. Hearing back from our patients is one way we can continue to improve our services. Please take a few minutes to complete the written survey that you may receive in the mail after your visit with us. Thank you!             Your Updated Medication List - Protect others around you: Learn how to safely use, store and throw away your medicines at www.disposemymeds.org.          This list is accurate as of 2/19/18 12:04 PM.  Always use your most recent med list.                   Brand Name Dispense Instructions for use Diagnosis    acetaminophen 500 MG tablet    TYLENOL    100 tablet    Take 2 tablets (1,000 mg) by mouth every 6 hours as needed for mild pain    Concussion, with loss of consciousness of 30 minutes or less, initial encounter       albuterol 108 (90 BASE) MCG/ACT Inhaler    PROAIR HFA/PROVENTIL HFA/VENTOLIN HFA    1 Inhaler    Inhale 2 puffs into the lungs every 6 hours as needed for shortness of breath / dyspnea    Cough       ALLEGRA ALLERGY PO      Take by mouth daily        escitalopram 10 MG tablet    LEXAPRO    90 tablet    Take 1 tablet  (10 mg) by mouth daily    TIFFANY (generalized anxiety disorder)       fluticasone 110 MCG/ACT Inhaler    FLOVENT HFA    1 Inhaler    Inhale 2 puffs into the lungs 2 times daily 2 puffs    Cough       fluticasone 50 MCG/ACT spray    FLONASE    1 Bottle    Spray 1-2 sprays into both nostrils daily    Acute recurrent maxillary sinusitis       lisinopril 5 MG tablet    PRINIVIL/ZESTRIL    90 tablet    Take 1 tablet (5 mg) by mouth daily    Essential hypertension with goal blood pressure less than 140/90       methylPREDNISolone acetate 40 MG/ML injection    DEPO-MEDROL    1 mL    1 mL (40 mg) by INTRA-ARTICULAR route once for 1 dose    Primary localized osteoarthrosis of left lower leg       metroNIDAZOLE 0.75 % cream    METROCREAM    45 g    APPLY TOPICALLY TWO TIMES A DAY TO FACE    Rosacea       mirtazapine 30 MG tablet    REMERON    90 tablet    Take 1 tablet (30 mg) by mouth At Bedtime    TIFFANY (generalized anxiety disorder), Adjustment disorder with mixed anxiety and depressed mood       QUEtiapine 25 MG tablet    SEROQUEL    180 tablet    Take 2 tablets (50 mg) by mouth At Bedtime    Anxiety disorder, unspecified type

## 2018-02-19 NOTE — PROGRESS NOTES
[facility]   [facility]   Clinic Visit s Feb 19, 2018    PCP: Helga Ibarra      Jolynn is a 62 year old female who is seen in consultation at the request of Dr. Ibarra for   Chief Complaint   Patient presents with     Musculoskeletal Problem     left knee pain       Injury: Patient reports insidious onset without acute precipitating event.     right-hand dominant    Location of Pain: left knee medial, nonradiating   Duration of Pain: 11/2017  - worsened after cross country skiing  Rating of Pain at worst: 6/10  Rating of Pain Currently: 1/10  Pain is better with: ice and pain medication: naproxen (NAPROSYN/ALEVE)   Pain is worse with: getting down to floor, walking  and running   Treatment so far consists of: rest, ice, exercises, 3 Aleve BID  Associated symptoms: feels tight knee, clicking no distal numbness or tingling; denies swelling or warmth  Recent imaging completed: X-rays completed see below.  Prior History of related problems: intermittent chronic knee pain    Social History: retired     Past Medical History:   Diagnosis Date     Abnormal glandular Papanicolaou smear of cervix      Abnormal glandular Papanicolaou smear of cervix- ASCUS in 2000 12/5/2003    ASC H- 2000     Basal cell carcinoma      Basal cell carcinoma of leg, right 5/2016     Concussion without loss of consciousness 4/12/2017     Hidradenitis     left  groin     HSIL (high grade squamous intraepithelial lesion) on Pap smear of cervix 7/5/2000    Normal paps from 2001 to 2016/cc     Hypertension goal BP (blood pressure) < 140/90      OA (osteoarthritis) of knee     moderate     Postmenopausal since 7/2008      Shingles 7/23/2010    left      Synovial cyst of popliteal space        Patient Active Problem List    Diagnosis Date Noted     CARDIOVASCULAR SCREENING; LDL GOAL LESS THAN 160 10/31/2010     Priority: High     Essential hypertension with goal blood pressure less than 140/90 07/16/2001     Priority: High      Other secondary osteoarthritis of left knee 01/29/2018     Priority: Medium     Chronic constipation 09/14/2017     Priority: Medium     Raynaud's phenomenon without gangrene 09/14/2017     Priority: Medium     Insomnia, unspecified type 07/25/2017     Priority: Medium     TFIFANY (generalized anxiety disorder) 05/23/2017     Priority: Medium     Adjustment disorder with mixed anxiety and depressed mood- mild at this time 05/11/2017     Priority: Medium     Family history of celiac disease 05/11/2017     Priority: Medium     Basal cell carcinoma of leg, right 05/01/2016     Priority: Medium     Sun-damaged skin 04/21/2016     Priority: Medium     Seasonal allergic rhinitis 04/21/2016     Priority: Medium     Dupuytren's disease of palm - right 4th digit flexor tendon 06/11/2015     Priority: Medium     History of migraine headaches- assoc. with nausea/vomiting - resolved around menopause - were  premenstrual  01/08/2014     Priority: Medium     Rosacea 05/01/2013     Priority: Medium     Numbness of feet- distal feet R>L  02/21/2011     Priority: Medium     NUMBNESS AND TINGLING OF FOOT - bilateral -mild  08/25/2010     Priority: Medium     Osteopenia 01/02/2009     Priority: Medium     Lipidosis 10/24/2006     Priority: Medium     Problem list name updated by automated process. Provider to review       Localized osteoarthritis of hand 11/01/2005     Priority: Medium     Problem list name updated by automated process. Provider to review       Stress incontinence, female - mild 10/23/2012     Priority: Low     Cough 10/23/2012     Priority: Low     Acute bronchitis- recurrent- ? related to mold in school building- pt has since retired and no further bronchitis 10/23/2012     Priority: Low     Advanced directives, counseling/discussion 10/11/2011     Priority: Low     Patient states has Advance Directive and will bring in a copy to clinic.           Postmenopausal      Priority: Low     DERMATOPHYTOSIS OF NAIL-  "toenails  2006     Priority: Low     trichophyton rubrum on culture -         Synovial cyst of popliteal space      Priority: Low     Hidradenitis 2003     Priority: Low       Family History   Problem Relation Age of Onset     Thyroid Disease Mother      Graves disease     Hypertension Mother      DIABETES Mother      type 2     Blood Disease Mother       from leukemia at age 78     DIABETES Daughter      HEART DISEASE Father 62     MV problem with CHF  at 62/ from MI     Hypertension Father      Cardiovascular Father      CANCER Maternal Uncle      type ??     CANCER Maternal Uncle      liver       Social History     Social History     Marital status:      Spouse name: Zach     Number of children: 2     Years of education: 16     Occupational History     teacher first grade -retired at age 60      [employer]     Social History Main Topics     Smoking status: Never Smoker     Smokeless tobacco: Never Used     Alcohol use 12.0 oz/week      Comment: 1 per month     Past Surgical History:   Procedure Laterality Date     C NONSPECIFIC PROCEDURE      Colposcopy         Review of Systems   Musculoskeletal: Positive for joint pain.   All other systems reviewed and are negative.        Physical Exam   Musculoskeletal:        Right knee: Normal.        Left knee: Lateral joint line tenderness noted.      Resp 12  Ht 5' 9\" (1.753 m)  Wt 159 lb (72.1 kg)  LMP 2001  BMI 23.48 kg/m2  Constitutional:well-developed, well-nourished, and in no distress.   Cardiovascular: Intact distal pulses.    Neurological: alert. Gait Normal:   Gait, station, stance, and balance appear normal for age  Skin: Skin is warm and dry.   Psychiatric: Mood and affect normal.   Respiratory: unlabored, speaks in full sentences  Lymph: no LAD, no lymphangitis          Left Knee Exam   Swelling: Mild  Effusion: Yes    Tenderness   The patient is " experiencing tenderness in the lateral joint line.    Range of Motion   Extension: 5  Flexion:     120    Tests   McMurrays:  Medial - Negative      Lateral - Negative  Lachman:  Anterior - Negative    Posterior - Negative  Drawer:       Anterior - Negative     Posterior - Negative  Varus:  Negative  Valgus: Negative  Pivot Shift: Negative  Patellar Apprehension: No    Right Knee Exam   Right knee exam is normal.    Tenderness   None    Range of Motion   Normal right knee ROM    Muscle Strength   Normal right knee strength          KNEE ONE-TWO VIEWS LEFT  1/29/2018 4:10 PM   KNEE ONE-TWO VIEWS LEFT  1/29/2018 4:10 PM      HISTORY: increased left knee pain and swelling; Acute pain of left  knee     COMPARISON: None.     FINDINGS: There is narrowing of the medial joint compartment of the  left knee. There appears to be a small knee joint effusion present.  Mild narrowing of the medial compartment of the right knee is also  noted..         IMPRESSION: Degenerative changes in both knees, left greater than  right with significant narrowing of the medial joint compartment of  the left knee.     JO SANZ MD  Assessment:    ICD-10-CM    1. Primary localized osteoarthrosis of left lower leg M17.12 METHYLPREDNISOLONE 40 MG INJ     methylPREDNISolone acetate (DEPO-MEDROL) 40 MG/ML injection     DRAIN/INJECT LARGE JOINT/BURSA     Reviewed nature of degenerative arthritis, discussed options including joint protection strategies and symptom management, including NSAIDS,  acetaminophen on a scheduled and/or as needed basis, joint injection with cortisone or hyaluronic acid derivatives, bracing, glucosamine, maintenance of overall knee stability through strengthening through physical therapy.  Pt opts for  symptom treatment, injection therapy and support for the affected area      Plan:  Has brace at home.  May call for PHYSICAL THERAPY  Advised to reduce aleve to no more than 4 daily;.       The benefits, risks, indications for  and alternatives to the procedure were discussed with the patient, including bleeding, infection, hyperglycemia, localized lipodystrophy and hypopigmentation. She elected to proceed, and informed consent was signed.    PROCEDURE:  JOINT INJECTION.         After a discussion of risks, benefits and side effects of procedure, informed patient consent was obtained, and form signed by patient and physician.       The left  knee was prepped with Chloroprep.    INJECTION:  Using 4 cc of 1% lidocaine mixed                           with 40 mg of DepoMedrol, the left knee joint was successfully injected                           without complication using a 22G needle with the patient lying supine and the injection administered using the superolateral or lateral patellar approach.  She tolerated the procedure well with minimal discomfort. Bandaid applied. Instructed to monitor for increased warmth, redness, pain or swelling which might indicate an infection.

## 2018-02-19 NOTE — LETTER
2/19/2018         RE: Jolynn Haji  [address] [place] [place] [address]        Dear Colleague,    Thank you for referring your patient, Jolynn Haji, to the [facility]. Please see a copy of my visit note below.    HPI   [facility]   Clinic Visit s Feb 19, 2018    PCP: Helga Ibarra      Jolynn is a 62 year old female who is seen in consultation at the request of Dr. Ibarra for   Chief Complaint   Patient presents with     Musculoskeletal Problem     left knee pain       Injury: Patient reports insidious onset without acute precipitating event.     right-hand dominant    Location of Pain: left knee medial, nonradiating   Duration of Pain: 11/2017  - worsened after cross country skiing  Rating of Pain at worst: 6/10  Rating of Pain Currently: 1/10  Pain is better with: ice and pain medication: naproxen (NAPROSYN/ALEVE)   Pain is worse with: getting down to floor, walking  and running   Treatment so far consists of: rest, ice, exercises, 3 Aleve BID  Associated symptoms: feels tight knee, clicking no distal numbness or tingling; denies swelling or warmth  Recent imaging completed: X-rays completed see below.  Prior History of related problems: intermittent chronic knee pain    Social History: retired     Past Medical History:   Diagnosis Date     Abnormal glandular Papanicolaou smear of cervix      Abnormal glandular Papanicolaou smear of cervix- ASCUS in 2000 12/5/2003    ASC H- 2000     Basal cell carcinoma      Basal cell carcinoma of leg, right 5/2016     Concussion without loss of consciousness 4/12/2017     Hidradenitis     left  groin     HSIL (high grade squamous intraepithelial lesion) on Pap smear of cervix 7/5/2000    Normal paps from 2001 to 2016/cc     Hypertension goal BP (blood pressure) < 140/90      OA (osteoarthritis) of knee     moderate     Postmenopausal since 7/2008      Shingles 7/23/2010    left      Synovial cyst of popliteal  space        Patient Active Problem List    Diagnosis Date Noted     CARDIOVASCULAR SCREENING; LDL GOAL LESS THAN 160 10/31/2010     Priority: High     Essential hypertension with goal blood pressure less than 140/90 07/16/2001     Priority: High     Other secondary osteoarthritis of left knee 01/29/2018     Priority: Medium     Chronic constipation 09/14/2017     Priority: Medium     Raynaud's phenomenon without gangrene 09/14/2017     Priority: Medium     Insomnia, unspecified type 07/25/2017     Priority: Medium     TIFFANY (generalized anxiety disorder) 05/23/2017     Priority: Medium     Adjustment disorder with mixed anxiety and depressed mood- mild at this time 05/11/2017     Priority: Medium     Family history of celiac disease 05/11/2017     Priority: Medium     Basal cell carcinoma of leg, right 05/01/2016     Priority: Medium     Sun-damaged skin 04/21/2016     Priority: Medium     Seasonal allergic rhinitis 04/21/2016     Priority: Medium     Dupuytren's disease of palm - right 4th digit flexor tendon 06/11/2015     Priority: Medium     History of migraine headaches- assoc. with nausea/vomiting - resolved around menopause - were  premenstrual  01/08/2014     Priority: Medium     Rosacea 05/01/2013     Priority: Medium     Numbness of feet- distal feet R>L  02/21/2011     Priority: Medium     NUMBNESS AND TINGLING OF FOOT - bilateral -mild  08/25/2010     Priority: Medium     Osteopenia 01/02/2009     Priority: Medium     Lipidosis 10/24/2006     Priority: Medium     Problem list name updated by automated process. Provider to review       Localized osteoarthritis of hand 11/01/2005     Priority: Medium     Problem list name updated by automated process. Provider to review       Stress incontinence, female - mild 10/23/2012     Priority: Low     Cough 10/23/2012     Priority: Low     Acute bronchitis- recurrent- ? related to mold in school building- pt has since retired and no further bronchitis 10/23/2012      "Priority: Low     Advanced directives, counseling/discussion 10/11/2011     Priority: Low     Patient states has Advance Directive and will bring in a copy to clinic.           Postmenopausal      Priority: Low     DERMATOPHYTOSIS OF NAIL- toenails  2006     Priority: Low     trichophyton rubrum on culture -         Synovial cyst of popliteal space      Priority: Low     Hidradenitis 2003     Priority: Low       Family History   Problem Relation Age of Onset     Thyroid Disease Mother      Graves disease     Hypertension Mother      DIABETES Mother      type 2     Blood Disease Mother       from leukemia at age 78     DIABETES Daughter      HEART DISEASE Father 62     MV problem with CHF  at 62/ from MI     Hypertension Father      Cardiovascular Father      CANCER Maternal Uncle      type ??     CANCER Maternal Uncle      liver       Social History     Social History     Marital status:      Spouse name: Zach     Number of children: 2     Years of education: 16     Occupational History     teacher first grade -retired at age 60      [employer]     Social History Main Topics     Smoking status: Never Smoker     Smokeless tobacco: Never Used     Alcohol use 12.0 oz/week      Comment: 1 per month     Past Surgical History:   Procedure Laterality Date     C NONSPECIFIC PROCEDURE      Colposcopy         Review of Systems   Musculoskeletal: Positive for joint pain.   All other systems reviewed and are negative.        Physical Exam   Musculoskeletal:        Right knee: Normal.        Left knee: Lateral joint line tenderness noted.      Resp 12  Ht 5' 9\" (1.753 m)  Wt 159 lb (72.1 kg)  LMP 2001  BMI 23.48 kg/m2  Constitutional:well-developed, well-nourished, and in no distress.   Cardiovascular: Intact distal pulses.    Neurological: alert. Gait Normal:   Gait, station, stance, and balance appear normal for age  Skin: " Skin is warm and dry.   Psychiatric: Mood and affect normal.   Respiratory: unlabored, speaks in full sentences  Lymph: no LAD, no lymphangitis          Left Knee Exam   Swelling: Mild  Effusion: Yes    Tenderness   The patient is experiencing tenderness in the lateral joint line.    Range of Motion   Extension: 5  Flexion:     120    Tests   McMurrays:  Medial - Negative      Lateral - Negative  Lachman:  Anterior - Negative    Posterior - Negative  Drawer:       Anterior - Negative     Posterior - Negative  Varus:  Negative  Valgus: Negative  Pivot Shift: Negative  Patellar Apprehension: No    Right Knee Exam   Right knee exam is normal.    Tenderness   None    Range of Motion   Normal right knee ROM    Muscle Strength   Normal right knee strength          KNEE ONE-TWO VIEWS LEFT  1/29/2018 4:10 PM   KNEE ONE-TWO VIEWS LEFT  1/29/2018 4:10 PM      HISTORY: increased left knee pain and swelling; Acute pain of left  knee     COMPARISON: None.     FINDINGS: There is narrowing of the medial joint compartment of the  left knee. There appears to be a small knee joint effusion present.  Mild narrowing of the medial compartment of the right knee is also  noted..         IMPRESSION: Degenerative changes in both knees, left greater than  right with significant narrowing of the medial joint compartment of  the left knee.     JO SANZ MD  Assessment:    ICD-10-CM    1. Primary localized osteoarthrosis of left lower leg M17.12 METHYLPREDNISOLONE 40 MG INJ     methylPREDNISolone acetate (DEPO-MEDROL) 40 MG/ML injection     DRAIN/INJECT LARGE JOINT/BURSA     Reviewed nature of degenerative arthritis, discussed options including joint protection strategies and symptom management, including NSAIDS,  acetaminophen on a scheduled and/or as needed basis, joint injection with cortisone or hyaluronic acid derivatives, bracing, glucosamine, maintenance of overall knee stability through strengthening through physical therapy.  Pt opts  for  symptom treatment, injection therapy and support for the affected area      Plan:  Has brace at home.  May call for PHYSICAL THERAPY  Advised to reduce aleve to no more than 4 daily;.       The benefits, risks, indications for and alternatives to the procedure were discussed with the patient, including bleeding, infection, hyperglycemia, localized lipodystrophy and hypopigmentation. She elected to proceed, and informed consent was signed.    PROCEDURE:  JOINT INJECTION.         After a discussion of risks, benefits and side effects of procedure, informed patient consent was obtained, and form signed by patient and physician.       The left  knee was prepped with Chloroprep.    INJECTION:  Using 4 cc of 1% lidocaine mixed                           with 40 mg of DepoMedrol, the left knee joint was successfully injected                           without complication using a 22G needle with the patient lying supine and the injection administered using the superolateral or lateral patellar approach.  She tolerated the procedure well with minimal discomfort. Bandaid applied. Instructed to monitor for increased warmth, redness, pain or swelling which might indicate an infection.        Again, thank you for allowing me to participate in the care of your patient.        Sincerely,        Jimmie De León MD

## 2018-04-05 DIAGNOSIS — I10 ESSENTIAL HYPERTENSION WITH GOAL BLOOD PRESSURE LESS THAN 140/90: ICD-10-CM

## 2018-04-05 RX ORDER — LISINOPRIL 5 MG/1
TABLET ORAL
Qty: 90 TABLET | Refills: 1 | Status: SHIPPED | OUTPATIENT
Start: 2018-04-05 | End: 2018-10-01

## 2018-04-05 NOTE — TELEPHONE ENCOUNTER
"Requested Prescriptions   Pending Prescriptions Disp Refills     lisinopril (PRINIVIL/ZESTRIL) 5 MG tablet [Pharmacy Med Name: LISINOPRIL 5MG TABS] 90 tablet 1     Sig: TAKE ONE TABLET BY MOUTH DAILY    Last Refill:   Medication Detail         Disp Refills Start End SCOTT     lisinopril (PRINIVIL/ZESTRIL) 5 MG tablet 90 tablet 1 9/14/2017  No     Sig: Take 1 tablet (5 mg) by mouth daily       ACE Inhibitors (Including Combos) Protocol Passed    4/5/2018 11:24 AM       Passed - Blood pressure under 140/90 in past 12 months    BP Readings from Last 3 Encounters:   02/08/18 139/84   01/29/18 124/72   11/13/17 126/70            Passed - Recent (12 mo) or future (30 days) visit within the authorizing provider's specialty    Patient had office visit in the last 12 months or has a visit in the next 30 days with authorizing provider or within the authorizing provider's specialty.  See \"Patient Info\" tab in inbasket, or \"Choose Columns\" in Meds & Orders section of the refill encounter.      LOV: 01/29/2018         Passed - Patient is age 18 or older       Passed - No active pregnancy on record       Passed - Normal serum creatinine on file in past 12 months    Recent Labs   Lab Test  06/19/17   1350   CR  0.83            Passed - Normal serum potassium on file in past 12 months    Recent Labs   Lab Test  06/19/17   1350   POTASSIUM  4.1            Passed - No positive pregnancy test in past 12 months        Refilled per RN Protocol.     Twila Hernandez RN  [place] [facility]      "

## 2018-04-25 ENCOUNTER — OFFICE VISIT (OUTPATIENT)
Dept: PSYCHIATRY | Facility: CLINIC | Age: 63
End: 2018-04-25
Attending: CLINICAL NURSE SPECIALIST
Payer: COMMERCIAL

## 2018-04-25 VITALS
BODY MASS INDEX: 26.02 KG/M2 | SYSTOLIC BLOOD PRESSURE: 146 MMHG | DIASTOLIC BLOOD PRESSURE: 82 MMHG | WEIGHT: 176.2 LBS | HEART RATE: 78 BPM

## 2018-04-25 DIAGNOSIS — F41.1 GAD (GENERALIZED ANXIETY DISORDER): ICD-10-CM

## 2018-04-25 DIAGNOSIS — F43.23 ADJUSTMENT DISORDER WITH MIXED ANXIETY AND DEPRESSED MOOD: ICD-10-CM

## 2018-04-25 PROCEDURE — G0463 HOSPITAL OUTPT CLINIC VISIT: HCPCS | Mod: ZF

## 2018-04-25 RX ORDER — ESCITALOPRAM OXALATE 10 MG/1
10 TABLET ORAL DAILY
Qty: 90 TABLET | Refills: 1 | Status: SHIPPED | OUTPATIENT
Start: 2018-04-25 | End: 2018-10-25

## 2018-04-25 RX ORDER — MIRTAZAPINE 30 MG/1
30 TABLET, FILM COATED ORAL AT BEDTIME
Qty: 90 TABLET | Refills: 1 | Status: SHIPPED | OUTPATIENT
Start: 2018-04-25 | End: 2018-10-25 | Stop reason: DRUGHIGH

## 2018-04-25 ASSESSMENT — PAIN SCALES - GENERAL: PAINLEVEL: NO PAIN (0)

## 2018-04-25 NOTE — NURSING NOTE
Chief Complaint   Patient presents with     Recheck Medication     anxiety disorder, insomnia

## 2018-04-25 NOTE — PATIENT INSTRUCTIONS
Thank you for coming to the PSYCHIATRY CLINIC.    Lab Testing:  If you had lab testing today and your results are reassuring or normal they will be mailed to you or sent through [facility] within 7 days.   If the lab tests need quick action we will call you with the results.  The phone number we will call with results is # [phone] (home) . If this is not the best number please call our clinic and change the number.    Medication Refills:  If you need any refills please call your pharmacy and they will contact us. Our fax number for refills is [phone]. Please allow three business for refill processing.   If you need to  your refill at a new pharmacy, please contact the new pharmacy directly. The new pharmacy will help you get your medications transferred.     Scheduling:  If you have any concerns about today's visit or wish to schedule another appointment please call our office during normal business hours [phone] (8-5:00 M-F)    Contact Us:  Please call [phone] during business hours (8-5:00 M-F).  If after clinic hours, or on the weekend, please call  [phone].    Financial Assistance [phone]  [email or web address] Billing [phone]  [facility] Billing [phone]  Medical Records [phone]      MENTAL HEALTH CRISIS NUMBERS:  [facility]:   [facility] - [phone]   Crisis Residence [facility] - [phone]   Walk-In Counseling [facility] [phone]   COPE 24/7 [facility] Mobile Team for Adults - [[phone]]; Child - [[phone]]     Crisis Connection - [phone]     [facility]:   [facility] - [phone]   Walk-in counseling [facility] - [phone]   Walk-in counseling [facility] - [phone]   Crisis Residence [facility] - [phone]   Urgent Care Adult Mental Health:   --Drop-in, 24/7 crisis line, and [place] [employer] Mobile Team [[phone]]    CRISIS TEXT  LINE: Text [phone] from anywhere, anytime, any crisis 24/7;    OR SEE www.crisistextline.org     Poison Control Center - [phone]    CHILD: [facility] needs assessment team - [phone]     [facility] - [phone]; or [facility] - [phone]    If you have a medical emergency please call 911or go to the nearest ER.                    _____________________________________________    Again thank you for choosing PSYCHIATRY CLINIC and please let us know how we can best partner with you to improve you and your family's health.  You may be receiving a survey in the mail regarding this appointment. We would love to have your feedback, both positive and negative, so please fill out the survey and return it using the provided envolpe. The survey is done by an external company, so your answers are anonymous.

## 2018-04-25 NOTE — PROGRESS NOTES
Outpatient Psychiatry Progress Note     Provider: FORTINO Reaves CNS  Date: 2018  Service:  Medication follow up with counseling.   Patient Identification: Jolynn Haji  : 1955   MRN: [id number]    Jolynn Haji is a 62 year old year old female who presents for ongoing psychiatric care.  Jolynn Haji was last seen in clinic on 10/25/17.   At that time,   Assessment & Plan       Jolynn Haji is seen today for follow up and reports significant improvement in anxiety and feels like prior to head injury.  Pt discussed about when possibly to wean off medications.  We discussed usually considering to wean off medications 6 months to 1 year after stabilization.  Since there are holidays, discussed if there's no change in symptoms, maybe to consider weaning off in 2018.     Diagnosis  TIFFANY,   Adjustment disorder with mixed anxiety and depression     Plan:  Medication: until February continue current medications.  At that time decrease Seroquel to 25mg at bedtime but continue Lexapro 10mg and Mirtazapine 30mg.  OTC Recommendations: none  Lab Orders:  none  Referrals: none  Release of Information: none  Future Treatment Considerations: If tolerated decrease in Seroquel to 25mg then consider discontinuing it in April  Return for Follow Up: 6 months      ____________________________________________________________________________________________________________________________________________    2018  Today Jolynn reports she is feeling very well. Feels back to herself and has been traveling. She cut back on Seroquel to 25mg in February. Has gained a lot weight but only in the last 2 months.  Thsi may correspond to knee injury.   Feels that she has recovered from the head injury completely. Balance is much better. Still taking Yoga weekly.   Side effects of medication include: constipation  Psychiatric Review of Systems:  Depression: In the last 2 weeks per PHQ 9 score of zero  Anxiety : only when  talking at her aunts   Semea na   Psychosis  na.   ADHD na    Review of Medical Systems:  Sleep: very good  Energy: good  Concentration: stable   Appetite: see subjective  GI Concerns: some constipation  Cardiac concerns: none  Neurological concerns: none  Other medical concerns: no new concerns  Current Substance Use:  Alcohol:denies frequent use  Other drugs:none  Caffeine:2 cups  Nicotine: none  Past Medical History:   Past Medical History:   Diagnosis Date     Abnormal glandular Papanicolaou smear of cervix      Abnormal glandular Papanicolaou smear of cervix- ASCUS in 2003    ASC H-      Basal cell carcinoma      Basal cell carcinoma of leg, right 2016     Concussion without loss of consciousness 2017     Hidradenitis     left  groin     HSIL (high grade squamous intraepithelial lesion) on Pap smear of cervix 2000    Normal paps from  to      Hypertension goal BP (blood pressure) < 140/90      OA (osteoarthritis) of knee     moderate     Postmenopausal since 2008      Shingles 2010    left      Synovial cyst of popliteal space      Patient Active Problem List   Diagnosis     Essential hypertension with goal blood pressure less than 140/90     Hidradenitis     Localized osteoarthritis of hand     Synovial cyst of popliteal space     DERMATOPHYTOSIS OF NAIL- toenails      Lipidosis     Osteopenia     Postmenopausal     NUMBNESS AND TINGLING OF FOOT - bilateral -mild      CARDIOVASCULAR SCREENING; LDL GOAL LESS THAN 160     Numbness of feet- distal feet R>L      Advanced directives, counseling/discussion     Stress incontinence, female - mild     Cough     Acute bronchitis- recurrent- ? related to mold in school building- pt has since retired and no further bronchitis     Rosacea     History of migraine headaches- assoc. with nausea/vomiting - resolved around menopause - were  premenstrual      Dupuytren's disease of palm - right 4th digit flexor tendon      Sun-damaged skin     Seasonal allergic rhinitis     Basal cell carcinoma of leg, right     Adjustment disorder with mixed anxiety and depressed mood- mild at this time     Family history of celiac disease     TIFFANY (generalized anxiety disorder)     Insomnia, unspecified type     Chronic constipation     Raynaud's phenomenon without gangrene     Other secondary osteoarthritis of left knee     Primary localized osteoarthrosis of left lower leg       Allergies:   Allergies   Allergen Reactions     Augmentin Itching     itching, rash     Fosamax Diarrhea     Diarrhea, stomach cramps, jaw pain           Current Medications     Current Outpatient Prescriptions Ordered in [facility]   Medication Sig Dispense Refill     acetaminophen (TYLENOL) 500 MG tablet Take 2 tablets (1,000 mg) by mouth every 6 hours as needed for mild pain 100 tablet 0     escitalopram (LEXAPRO) 10 MG tablet Take 1 tablet (10 mg) by mouth daily 90 tablet 1     lisinopril (PRINIVIL/ZESTRIL) 5 MG tablet TAKE ONE TABLET BY MOUTH DAILY 90 tablet 1     metroNIDAZOLE (METROCREAM) 0.75 % cream APPLY TOPICALLY TWO TIMES A DAY TO FACE 45 g 5     mirtazapine (REMERON) 30 MG tablet Take 1 tablet (30 mg) by mouth At Bedtime 90 tablet 1     QUEtiapine (SEROQUEL) 25 MG tablet Take 2 tablets (50 mg) by mouth At Bedtime 180 tablet 1     albuterol (PROAIR HFA/PROVENTIL HFA/VENTOLIN HFA) 108 (90 BASE) MCG/ACT Inhaler Inhale 2 puffs into the lungs every 6 hours as needed for shortness of breath / dyspnea (Patient not taking: Reported on 4/25/2018) 1 Inhaler 5     Fexofenadine HCl (ALLEGRA ALLERGY PO) Take by mouth daily       fluticasone (FLONASE) 50 MCG/ACT spray Spray 1-2 sprays into both nostrils daily (Patient not taking: Reported on 4/25/2018) 1 Bottle 11     fluticasone (FLOVENT HFA) 110 MCG/ACT inhaler Inhale 2 puffs into the lungs 2 times daily 2 puffs (Patient not taking: Reported on 4/25/2018) 1 Inhaler 12     No current [facility]-ordered facility-administered medications  "on file.         Mental Status Exam     Appearance:  Casually dressed and Well groomed  Behavior/relationship to examiner/demeanor:  Cooperative  Orientation: Oriented to person, place, time and situation  Psychomotor: normal form  Speech Rate:  Normal  Speech Spontaneity:  Normal  Mood:  \"good\"  Affect:  Appropriate/mood-congruent  Thought Process (Associations):  Logical and Goal directed  Thought Content:  no overt psychosis, denies suicidal ideation, intent or thoughts and patient does not appear to be responding to internal stimuli  Abnormal Perception:  None  Attention/Concentration:  Normal  Insight:  Good  Judgment:  Good      Results     Vital signs: /82  Pulse 78  Wt 79.9 kg (176 lb 3.2 oz)  LMP 06/28/2001  BMI 26.02 kg/m2    Laboratory Data:  none    Assessment & Plan      Jolynn Haji is seen today for follow up and reports her mood has been good.    Diagnosis  TIFFANY,   Adjustment disorder with mixed anxiety and depression      Plan:  Medication: Discontinue Seroquel and continue Lexapro 10mg and Mirtazapine 30mg  OTC Recommendations: none  Lab Orders:  none  Referrals: none  Release of Information: per symptoms  Future Treatment Considerations:will taper off Mirtazapine in 6 months if doing well  Return for Follow Up:6 months   The risks, benefits, alternatives and side effects have been discussed and are understood by the patient. The patient understands the risks of using street drugs or alcohol. There are no medical contraindications, the patient agrees to treatment, and has the capacity to do so. The patient understands to call 911 or come to the nearest ED if life threatening or urgent symptoms present.  In addition time was spent counseling the patient and/or coordinating care regarding review of social and occupational functioning.  In addition patient was counseled on health and wellness practices to augment medication treatment of symptoms. See note for details.    Kim Brantley, " APRN CNS 4/25/2018

## 2018-04-25 NOTE — MR AVS SNAPSHOT
After Visit Summary   4/25/2018    Jolynn Haji    MRN: [id number]           Patient Information     Date Of Birth          1955        Visit Information        Provider Department      4/25/2018 10:15 AM Kim Brantley APRN [facility]        Today's Diagnoses     TIFFANY (generalized anxiety disorder)        Adjustment disorder with mixed anxiety and depressed mood- mild at this time          Care Instructions    Thank you for coming to the PSYCHIATRY CLINIC.    Lab Testing:  If you had lab testing today and your results are reassuring or normal they will be mailed to you or sent through [employer] within 7 days.   If the lab tests need quick action we will call you with the results.  The phone number we will call with results is # [phone] (home) . If this is not the best number please call our clinic and change the number.    Medication Refills:  If you need any refills please call your pharmacy and they will contact us. Our fax number for refills is [phone]. Please allow three business for refill processing.   If you need to  your refill at a new pharmacy, please contact the new pharmacy directly. The new pharmacy will help you get your medications transferred.     Scheduling:  If you have any concerns about today's visit or wish to schedule another appointment please call our office during normal business hours [phone] (8-5:00 M-F)    Contact Us:  Please call [phone] during business hours (8-5:00 M-F).  If after clinic hours, or on the weekend, please call  [phone].    Financial Assistance [phone]  [facility] Billing [phone]  [facility] [phone]  Medical Records [phone]      MENTAL HEALTH CRISIS NUMBERS:  [facility]:   [facility] - [phone]   Crisis Residence [facility] - [phone]   Walk-In Counseling Center [facility] - [phone]   COPE 24/7 [place] Mobile Team for Adults -  [[phone]]; Child - [[phone]]     Crisis Connection - [phone]     [facility]:   [facility] - [phone]   Walk-in counseling [facility] - [phone]   Walk-in counseling [facility] - [phone]   Crisis Residence [facility] - [phone]   Urgent Care Adult Mental Health:   --Drop-in, 24/7 crisis line, and [place] Mobile Team [[phone]]    CRISIS TEXT LINE: Text [phone] from anywhere, anytime, any crisis 24/7;    OR SEE www.crisistextline.org     Poison Control Center - [phone]    CHILD: [facility] needs assessment team - [phone]     Trans Lifeline - 1-739.200.8972; or [facility] Lif[facility] - 1-412.414.8021    If you have a medical emergency please call 911or go to the nearest ER.                    _____________________________________________    Again thank you for choosing PSYCHIATRY CLINIC and please let us know how we can best partner with you to improve you and your family's health.  You may be receiving a survey in the mail regarding this appointment. We would love to have your feedback, both positive and negative, so please fill out the survey and return it using the provided envolpe. The survey is done by an external company, so your answers are anonymous.             Follow-ups after your visit        Follow-up notes from your care team     Return in about 6 months (around 10/25/2018).      Your next 10 appointments already scheduled     Oct 25, 2018 10:15 AM CDT   Adult Med Follow UP with FORTINO Reaves CNS   Psychiatry Clinic ([facility])    [address] [address] [phone]              Who to contact     Please call your clinic at [phone] to:    Ask questions about your health    Make or cancel appointments    Discuss your medicines    Learn about your test results    Speak to your doctor             Additional Information About Your Visit        [facility] Information     [facility] gives you secure access to your electronic health record. If you see a primary care provider, you can also send messages to your care team and make appointments. If you have questions, please call your primary care clinic.  If you do not have a primary care provider, please call [phone] and they will assist you.      [facility] is an electronic gateway that provides easy, online access to your medical records. With [facility], you can request a clinic appointment, read your test results, renew a prescription or communicate with your care team.     To access your existing account, please contact your [facility] or call [phone] for assistance.        Care EveryWhere ID     This is your Care EveryWhere ID. This could be used by other organizations to access your [place] medical records  [id number]        Your Vitals Were     Pulse Last Period BMI (Body Mass Index)             78 06/28/2001 26.02 kg/m2          Blood Pressure from Last 3 Encounters:   04/25/18 146/82   02/08/18 139/84   01/29/18 124/72    Weight from Last 3 Encounters:   04/25/18 79.9 kg (176 lb 3.2 oz)   02/19/18 72.1 kg (159 lb)   01/29/18 72.1 kg (159 lb)              Today, you had the following     No orders found for display         Where to get your medicines      These medications were sent to [facility] [place] [address]     Phone:  [phone]     escitalopram 10 MG tablet    mirtazapine 30 MG tablet          Primary Care Provider Office Phone # Fax #    Helga Ibarra [phone]       [address] [place] [address]        Equal Access to Services     CANDI AVLIA : Jules Bernardo, mary carvalho, teri donaldson. So [facility]  [phone].    ATENCIÓN: Si rico lopes, tiene a christensen disposición servicios gratuitos de asistencia lingüística. Vanita schuster [phone].    We comply with applicable federal civil rights laws and [place] laws. We do not discriminate on the basis of race, color, national origin, age, disability, sex, sexual orientation, or gender identity.            Thank you!     Thank you for choosing PSYCHIATRY CLINIC  for your care. Our goal is always to provide you with excellent care. Hearing back from our patients is one way we can continue to improve our services. Please take a few minutes to complete the written survey that you may receive in the mail after your visit with us. Thank you!             Your Updated Medication List - Protect others around you: Learn how to safely use, store and throw away your medicines at www.disposemymeds.org.          This list is accurate as of 4/25/18 10:38 AM.  Always use your most recent med list.                   Brand Name Dispense Instructions for use Diagnosis    acetaminophen 500 MG tablet    TYLENOL    100 tablet    Take 2 tablets (1,000 mg) by mouth every 6 hours as needed for mild pain    Concussion, with loss of consciousness of 30 minutes or less, initial encounter       albuterol 108 (90 Base) MCG/ACT Inhaler    PROAIR HFA/PROVENTIL HFA/VENTOLIN HFA    1 Inhaler    Inhale 2 puffs into the lungs every 6 hours as needed for shortness of breath / dyspnea    Cough       ALLEGRA ALLERGY PO      Take by mouth daily        escitalopram 10 MG tablet    LEXAPRO    90 tablet    Take 1 tablet (10 mg) by mouth daily    TIFFANY (generalized anxiety disorder)       fluticasone 110 MCG/ACT Inhaler    FLOVENT HFA    1 Inhaler    Inhale 2 puffs into the lungs 2 times daily 2 puffs    Cough       fluticasone 50 MCG/ACT spray    FLONASE    1 Bottle    Spray 1-2 sprays into both nostrils daily    Acute recurrent maxillary sinusitis       lisinopril 5 MG tablet    PRINIVIL/ZESTRIL    90 tablet     TAKE ONE TABLET BY MOUTH DAILY    Essential hypertension with goal blood pressure less than 140/90       metroNIDAZOLE 0.75 % cream    METROCREAM    45 g    APPLY TOPICALLY TWO TIMES A DAY TO FACE    Rosacea       mirtazapine 30 MG tablet    REMERON    90 tablet    Take 1 tablet (30 mg) by mouth At Bedtime    TIFFANY (generalized anxiety disorder), Adjustment disorder with mixed anxiety and depressed mood

## 2018-04-30 ENCOUNTER — ALLIED HEALTH/NURSE VISIT (OUTPATIENT)
Dept: FAMILY MEDICINE | Facility: CLINIC | Age: 63
End: 2018-04-30
Payer: COMMERCIAL

## 2018-04-30 VITALS — SYSTOLIC BLOOD PRESSURE: 102 MMHG | DIASTOLIC BLOOD PRESSURE: 70 MMHG

## 2018-04-30 DIAGNOSIS — I10 ESSENTIAL HYPERTENSION WITH GOAL BLOOD PRESSURE LESS THAN 140/90: Primary | ICD-10-CM

## 2018-04-30 PROCEDURE — 99207 ZZC NO CHARGE NURSE ONLY: CPT | Performed by: FAMILY MEDICINE

## 2018-04-30 NOTE — PROGRESS NOTES
Jolynn Haji is enrolled/participating in the retail pharmacy Blood Pressure Goals Achievement Program (BPGAP).  Jolynn Haji was evaluated at [facility] on April 30, 2018 at which time her blood pressure was:    BP Readings from Last 3 Encounters:   04/30/18 102/70   02/08/18 139/84   01/29/18 124/72     Reviewed lifestyle modifications for blood pressure control and reduction: including making healthy food choices, managing weight, getting regular exercise, smoking cessation, reducing alcohol consumption, monitoring blood pressure regularly.     Jolynn Haji is not experiencing symptoms.    Follow-Up: BP is at goal of < 140/90mmHg (patient 18+ years of age with or without diabetes).  Recommended follow-up at pharmacy in 6 months.     Recommendation to Provider: No change    Jolynn Haji was evaluated for enrollment into the PGEN study today.    Patient eligible for enrollment:  No  Patient interested in enrollment:  No    Completed by: Thank you,  Pallavi Bustos RP, Mgr [facility] [phone]

## 2018-04-30 NOTE — MR AVS SNAPSHOT
After Visit Summary   4/30/2018    Jolynn Haji    MRN: [id number]           Patient Information     Date Of Birth          1955        Visit Information        Provider Department      4/30/2018 12:03 PM Helga Ibarra MD [facility]        Today's Diagnoses     Essential hypertension with goal blood pressure less than 140/90    -  1       Follow-ups after your visit        Your next 10 appointments already scheduled     Oct 25, 2018 10:15 AM CDT   Adult Med Follow UP with FORTINO Reaves CNS   Psychiatry Clinic ([facility])    [address]  [address] [address] [phone]              Who to contact     If you have questions or need follow up information about today's clinic visit or your schedule please contact [facility] directly at [phone].  Normal or non-critical lab and imaging results will be communicated to you by MyChart, letter or phone within 4 business days after the clinic has received the results. If you do not hear from us within 7 days, please contact the clinic through [employer] or phone. If you have a critical or abnormal lab result, we will notify you by phone as soon as possible.  Submit refill requests through [employer] or call your pharmacy and they will forward the refill request to us. Please allow 3 business days for your refill to be completed.          Additional Information About Your Visit        MyChart Information     [employer] gives you secure access to your electronic health record. If you see a primary care provider, you can also send messages to your care team and make appointments. If you have questions, please call your primary care clinic.  If you do not have a primary care provider, please call [phone] and they will assist you.        Care EveryWhere ID     This is your Care EveryWhere ID. This could be used by other organizations to access  your [place] medical records  [id number]        Your Vitals Were     Last Period                   06/28/2001            Blood Pressure from Last 3 Encounters:   04/30/18 102/70   02/08/18 139/84   01/29/18 124/72    Weight from Last 3 Encounters:   02/19/18 159 lb (72.1 kg)   01/29/18 159 lb (72.1 kg)   09/14/17 150 lb 9.6 oz (68.3 kg)              Today, you had the following     No orders found for display       Primary Care Provider Office Phone # Fax #    Helga Ibarra [phone]       [address] [facility] [address]        Equal Access to Services     CANDI AVILA : Jules Bernardo, waapoorva carvalho, qaybta kaalmada randy, teri galvan. So [facility] [phone].    ATENCIÓN: Si habla español, tiene a christensen disposición servicios gratuitos de asistencia lingüística. Llame al [phone].    We comply with applicable federal civil rights laws and [place] laws. We do not discriminate on the basis of race, color, national origin, age, disability, sex, sexual orientation, or gender identity.            Thank you!     Thank you for choosing [facility]  for your care. Our goal is always to provide you with excellent care. Hearing back from our patients is one way we can continue to improve our services. Please take a few minutes to complete the written survey that you may receive in the mail after your visit with us. Thank you!             Your Updated Medication List - Protect others around you: Learn how to safely use, store and throw away your medicines at www.disposemymeds.org.          This list is accurate as of 4/30/18 12:04 PM.  Always use your most recent med list.                   Brand Name Dispense Instructions for use Diagnosis    acetaminophen 500 MG tablet    TYLENOL    100 tablet    Take 2 tablets (1,000 mg) by mouth every 6 hours as needed for mild pain    Concussion, with loss of consciousness of 30  minutes or less, initial encounter       albuterol 108 (90 Base) MCG/ACT Inhaler    PROAIR HFA/PROVENTIL HFA/VENTOLIN HFA    1 Inhaler    Inhale 2 puffs into the lungs every 6 hours as needed for shortness of breath / dyspnea    Cough       ALLEGRA ALLERGY PO      Take by mouth daily        escitalopram 10 MG tablet    LEXAPRO    90 tablet    Take 1 tablet (10 mg) by mouth daily    TIFFANY (generalized anxiety disorder)       fluticasone 110 MCG/ACT Inhaler    FLOVENT HFA    1 Inhaler    Inhale 2 puffs into the lungs 2 times daily 2 puffs    Cough       fluticasone 50 MCG/ACT spray    FLONASE    1 Bottle    Spray 1-2 sprays into both nostrils daily    Acute recurrent maxillary sinusitis       lisinopril 5 MG tablet    PRINIVIL/ZESTRIL    90 tablet    TAKE ONE TABLET BY MOUTH DAILY    Essential hypertension with goal blood pressure less than 140/90       metroNIDAZOLE 0.75 % cream    METROCREAM    45 g    APPLY TOPICALLY TWO TIMES A DAY TO FACE    Rosacea       mirtazapine 30 MG tablet    REMERON    90 tablet    Take 1 tablet (30 mg) by mouth At Bedtime    TIFFANY (generalized anxiety disorder), Adjustment disorder with mixed anxiety and depressed mood

## 2018-05-01 ENCOUNTER — TRANSFERRED RECORDS (OUTPATIENT)
Dept: HEALTH INFORMATION MANAGEMENT | Facility: CLINIC | Age: 63
End: 2018-05-01

## 2018-10-01 DIAGNOSIS — I10 ESSENTIAL HYPERTENSION WITH GOAL BLOOD PRESSURE LESS THAN 140/90: ICD-10-CM

## 2018-10-02 RX ORDER — LISINOPRIL 5 MG/1
TABLET ORAL
Qty: 30 TABLET | Refills: 0 | Status: SHIPPED | OUTPATIENT
Start: 2018-10-02 | End: 2018-10-04

## 2018-10-02 NOTE — TELEPHONE ENCOUNTER
Prescription approved per [facility] to the [facility] Refill Protocol.  #30 only. Due for physical and labs.      Paula Rosenbaum, MALIHA, RN, PHN  [facility]) [phone]

## 2018-10-02 NOTE — TELEPHONE ENCOUNTER
"Requested Prescriptions   Pending Prescriptions Disp Refills     lisinopril (PRINIVIL/ZESTRIL) 5 MG tablet [Pharmacy Med Name: LISINOPRIL 5MG TABS] 90 tablet 1        Last Written Prescription Date:  4.5.18  Last Fill Quantity: 90,  # refills: 1   Last office visit: 1/29/2018 with prescribing provider:     Future Office Visit:     Sig: TAKE ONE TABLET BY MOUTH DAILY    ACE Inhibitors (Including Combos) Protocol Failed    10/1/2018  8:36 PM       Failed - Normal serum creatinine on file in past 12 months    Recent Labs   Lab Test  06/19/17   1350   CR  0.83            Failed - Normal serum potassium on file in past 12 months    Recent Labs   Lab Test  06/19/17   1350   POTASSIUM  4.1            Passed - Blood pressure under 140/90 in past 12 months    BP Readings from Last 3 Encounters:   04/30/18 102/70   02/08/18 139/84   01/29/18 124/72                Passed - Recent (12 mo) or future (30 days) visit within the authorizing provider's specialty    Patient had office visit in the last 12 months or has a visit in the next 30 days with authorizing provider or within the authorizing provider's specialty.  See \"Patient Info\" tab in inbasket, or \"Choose Columns\" in Meds & Orders section of the refill encounter.           Passed - Patient is age 18 or older       Passed - No active pregnancy on record       Passed - No positive pregnancy test in past 12 months          "

## 2018-10-04 ENCOUNTER — TELEPHONE (OUTPATIENT)
Dept: FAMILY MEDICINE | Facility: CLINIC | Age: 63
End: 2018-10-04

## 2018-10-04 DIAGNOSIS — I10 ESSENTIAL HYPERTENSION WITH GOAL BLOOD PRESSURE LESS THAN 140/90: ICD-10-CM

## 2018-10-04 RX ORDER — LISINOPRIL 5 MG/1
5 TABLET ORAL DAILY
Qty: 30 TABLET | Refills: 0 | Status: SHIPPED | OUTPATIENT
Start: 2018-10-04 | End: 2018-11-30

## 2018-10-04 NOTE — TELEPHONE ENCOUNTER
Reason for Call:  Other prescription    Detailed comments: The patient stopped by the  saying she only got a 30 day prescription for her lisinopril. She says she can't wait until December to see Dr. Ibarra. She made an appointment for her physical/labs with Dr. Melgar on 11/30/2018. She needs enough lisinopril to get her to this visit. She uses the pharmacy next door here in [place].    Phone Number Patient can be reached at: Cell number on file:    Telephone Information:   Mobile [phone]     Best Time: Anytime    Can we leave a detailed message on this number? YES    Call taken on 10/4/2018 at 2:36 PM by Ida Carbajal

## 2018-10-04 NOTE — TELEPHONE ENCOUNTER
Another 30 day supply sent to pharmacy with note to profile until needed.      Paula Rosenbaum, BS, RN, PHN  [facility]) [phone]

## 2018-10-18 ENCOUNTER — HOSPITAL ENCOUNTER (OUTPATIENT)
Dept: MAMMOGRAPHY | Facility: CLINIC | Age: 63
Discharge: HOME | End: 2018-10-18
Attending: FAMILY MEDICINE | Admitting: FAMILY MEDICINE
Payer: COMMERCIAL

## 2018-10-18 DIAGNOSIS — Z12.31 VISIT FOR SCREENING MAMMOGRAM: ICD-10-CM

## 2018-10-18 PROCEDURE — 77063 BREAST TOMOSYNTHESIS BI: CPT

## 2018-10-23 ENCOUNTER — ALLIED HEALTH/NURSE VISIT (OUTPATIENT)
Dept: FAMILY MEDICINE | Facility: CLINIC | Age: 63
End: 2018-10-23
Payer: COMMERCIAL

## 2018-10-23 VITALS — SYSTOLIC BLOOD PRESSURE: 118 MMHG | DIASTOLIC BLOOD PRESSURE: 70 MMHG

## 2018-10-23 DIAGNOSIS — I10 ESSENTIAL HYPERTENSION WITH GOAL BLOOD PRESSURE LESS THAN 140/90: Primary | ICD-10-CM

## 2018-10-23 PROCEDURE — 99207 ZZC NO CHARGE NURSE ONLY: CPT | Performed by: FAMILY MEDICINE

## 2018-10-23 NOTE — PROGRESS NOTES
Jolynn Haji is enrolled/participating in the retail pharmacy Blood Pressure Goals Achievement Program (BPGAP).  Jolynn Haji was evaluated at [facility] on October 23, 2018 at which time her blood pressure was:    BP Readings from Last 3 Encounters:   10/23/18 118/70   04/30/18 102/70   02/08/18 139/84     Reviewed lifestyle modifications for blood pressure control and reduction: including making healthy food choices, managing weight, getting regular exercise, smoking cessation, reducing alcohol consumption, monitoring blood pressure regularly.     Jolynn Haji is not experiencing symptoms.    Follow-Up: BP is at goal of < 140/90mmHg (patient 18+ years of age with or without diabetes).  Recommended follow-up at pharmacy in 6 months.     Recommendation to Provider: NO change.    Jolynn Haji was evaluated for enrollment into the PGEN study today.    PLEASE INITIATE ENROLLMENT DISCUSSION WITH HTN PTS  1) Between 30-80 years old                                                                                                               2) BMI between 19-50                                                                                                        3) BP ?140/90 AND ?170/110 patients aged 30-59         BP ?150/90 AND ?170/110 non-diabetic patients aged 60-80       BP ?140/90 AND ?170/110 diabetic patients aged 60-80  4) Additional requirements for uncontrolled HTN patients:        Pt on only 1 class of medication  5) EXCLUDE patient if confirmation of:                  ? Cardiac disease                  ? Chronic Kidney Disease                  ? Pregnancy/Breastfeeding                  ? Secondary Hypertension/Pre-eclampsia                                 ? Vascular disease    Patient eligible for enrollment:  No  Patient interested in enrollment:  No    This note completed by: Thank you,  Pallavi Bustos RP, Mgr [facility] [place] [phone]

## 2018-10-23 NOTE — MR AVS SNAPSHOT
After Visit Summary   10/23/2018    Jolynn Haji    MRN: [id number]           Patient Information     Date Of Birth          1955        Visit Information        Provider Department      10/23/2018 3:21 PM Helga Ibarra MD [facility]        Today's Diagnoses     Essential hypertension with goal blood pressure less than 140/90    -  1       Follow-ups after your visit        Your next 10 appointments already scheduled     Oct 25, 2018 10:15 AM CDT   Adult Med Follow UP with FORTINO Reaves CNS   Psychiatry Clinic ([facility])    [address]  [address] [address]   [phone]            Nov 30, 2018  9:40 AM CST   PHYSICAL with Patrick Melgar MD   [facility] ([facility])    [address] [place] [id number] [phone]              Who to contact     If you have questions or need follow up information about today's clinic visit or your schedule please contact [facility] directly at [phone].  Normal or non-critical lab and imaging results will be communicated to you by [employer], letter or phone within 4 business days after the clinic has received the results. If you do not hear from us within 7 days, please contact the clinic through [employer] or phone. If you have a critical or abnormal lab result, we will notify you by phone as soon as possible.  Submit refill requests through [employer] or call your pharmacy and they will forward the refill request to us. Please allow 3 business days for your refill to be completed.          Additional Information About Your Visit        [employer] Information     [employer] gives you secure access to your electronic health record. If you see a primary care provider, you can also send messages to your care team and make appointments. If you have questions, please call your primary care clinic.  If you  do not have a primary care provider, please call [phone] and they will assist you.        Care EveryWhere ID     This is your Care EveryWhere ID. This could be used by other organizations to access your [place] medical records  [id number]        Your Vitals Were     Last Period                   06/28/2001            Blood Pressure from Last 3 Encounters:   10/23/18 118/70   04/30/18 102/70   02/08/18 139/84    Weight from Last 3 Encounters:   02/19/18 159 lb (72.1 kg)   01/29/18 159 lb (72.1 kg)   09/14/17 150 lb 9.6 oz (68.3 kg)              Today, you had the following     No orders found for display       Primary Care Provider Office Phone # Fax #    Helga Ibarra [phone]       [address] [facility] [address]        Equal Access to Services     [facility]: Hadii aad ku hadasho Soomaali, waaxda luqadaha, qaybta kaalmada adeegyada, teri espinozain haymateon kaden herrera . So [facility] [phone].    ATENCIÓN: Si habla español, tiene a christensen disposición servicios gratuitos de asistencia lingüística. Llame al [phone].    We comply with applicable federal civil rights laws and [place] laws. We do not discriminate on the basis of race, color, national origin, age, disability, sex, sexual orientation, or gender identity.            Thank you!     Thank you for choosing [facility]  for your care. Our goal is always to provide you with excellent care. Hearing back from our patients is one way we can continue to improve our services. Please take a few minutes to complete the written survey that you may receive in the mail after your visit with us. Thank you!             Your Updated Medication List - Protect others around you: Learn how to safely use, store and throw away your medicines at www.disposemymeds.org.          This list is accurate as of 10/23/18  3:22 PM.  Always use your most recent med list.                   Brand Name Dispense  Instructions for use Diagnosis    acetaminophen 500 MG tablet    TYLENOL    100 tablet    Take 2 tablets (1,000 mg) by mouth every 6 hours as needed for mild pain    Concussion, with loss of consciousness of 30 minutes or less, initial encounter       albuterol 108 (90 Base) MCG/ACT inhaler    PROAIR HFA/PROVENTIL HFA/VENTOLIN HFA    1 Inhaler    Inhale 2 puffs into the lungs every 6 hours as needed for shortness of breath / dyspnea    Cough       ALLEGRA ALLERGY PO      Take by mouth daily        escitalopram 10 MG tablet    LEXAPRO    90 tablet    Take 1 tablet (10 mg) by mouth daily    TIFFANY (generalized anxiety disorder)       fluticasone 110 MCG/ACT Inhaler    FLOVENT HFA    1 Inhaler    Inhale 2 puffs into the lungs 2 times daily 2 puffs    Cough       fluticasone 50 MCG/ACT spray    FLONASE    1 Bottle    Spray 1-2 sprays into both nostrils daily    Acute recurrent maxillary sinusitis       lisinopril 5 MG tablet    PRINIVIL/ZESTRIL    30 tablet    Take 1 tablet (5 mg) by mouth daily    Essential hypertension with goal blood pressure less than 140/90       metroNIDAZOLE 0.75 % cream    METROCREAM    45 g    APPLY TOPICALLY TWO TIMES A DAY TO FACE    Rosacea       mirtazapine 30 MG tablet    REMERON    90 tablet    Take 1 tablet (30 mg) by mouth At Bedtime    TIFFANY (generalized anxiety disorder), Adjustment disorder with mixed anxiety and depressed mood

## 2018-10-25 ENCOUNTER — OFFICE VISIT (OUTPATIENT)
Dept: PSYCHIATRY | Facility: CLINIC | Age: 63
End: 2018-10-25
Attending: CLINICAL NURSE SPECIALIST
Payer: COMMERCIAL

## 2018-10-25 VITALS
DIASTOLIC BLOOD PRESSURE: 81 MMHG | SYSTOLIC BLOOD PRESSURE: 144 MMHG | WEIGHT: 177.4 LBS | HEART RATE: 71 BPM | BODY MASS INDEX: 26.2 KG/M2

## 2018-10-25 DIAGNOSIS — F41.1 GAD (GENERALIZED ANXIETY DISORDER): Primary | ICD-10-CM

## 2018-10-25 DIAGNOSIS — F43.23 ADJUSTMENT DISORDER WITH MIXED ANXIETY AND DEPRESSED MOOD: ICD-10-CM

## 2018-10-25 DIAGNOSIS — G47.00 INSOMNIA, UNSPECIFIED TYPE: ICD-10-CM

## 2018-10-25 DIAGNOSIS — L71.9 ROSACEA: ICD-10-CM

## 2018-10-25 PROCEDURE — G0463 HOSPITAL OUTPT CLINIC VISIT: HCPCS | Mod: ZF

## 2018-10-25 RX ORDER — ESCITALOPRAM OXALATE 10 MG/1
10 TABLET ORAL DAILY
Qty: 90 TABLET | Refills: 3 | Status: SHIPPED | OUTPATIENT
Start: 2018-10-25 | End: 2019-10-10

## 2018-10-25 RX ORDER — MIRTAZAPINE 15 MG/1
15 TABLET, FILM COATED ORAL AT BEDTIME
Qty: 90 TABLET | Refills: 0 | Status: SHIPPED | OUTPATIENT
Start: 2018-10-25 | End: 2019-07-24

## 2018-10-25 ASSESSMENT — PATIENT HEALTH QUESTIONNAIRE - PHQ9: SUM OF ALL RESPONSES TO PHQ QUESTIONS 1-9: 0

## 2018-10-25 ASSESSMENT — PAIN SCALES - GENERAL: PAINLEVEL: NO PAIN (0)

## 2018-10-25 NOTE — MR AVS SNAPSHOT
After Visit Summary   10/25/2018    Jolynn Haji    MRN: [id number]           Patient Information     Date Of Birth          1955        Visit Information        Provider Department      10/25/2018 10:15 AM Kim Brantley APRN [facility]        Today's Diagnoses     TIFFANY (generalized anxiety disorder)    -  1    Insomnia, unspecified type        Adjustment disorder with mixed anxiety and depressed mood- mild at this time        Rosacea           Follow-ups after your visit        Your next 10 appointments already scheduled     Nov 30, 2018  9:40 AM CST   PHYSICAL with Patrick Melgar MD   [facility]    [address] [place] [id number] [phone]              Who to contact     Please call your clinic at [phone] to:    Ask questions about your health    Make or cancel appointments    Discuss your medicines    Learn about your test results    Speak to your doctor            Additional Information About Your Visit        [employer] Information     [facility] gives you secure access to your electronic health record. If you see a primary care provider, you can also send messages to your care team and make appointments. If you have questions, please call your primary care clinic.  If you do not have a primary care provider, please call [phone] and they will assist you.      [facility] is an electronic gateway that provides easy, online access to your medical records. With [facility], you can request a clinic appointment, read your test results, renew a prescription or communicate with your care team.     To access your existing account, please contact your [facility] or call [phone] for assistance.        Care EveryWhere ID     This is your Care EveryWhere ID. This could be used by other organizations to access your [place] medical records  [id number]        Your Vitals Were      Pulse Last Period BMI (Body Mass Index)             71 06/28/2001 26.2 kg/m2          Blood Pressure from Last 3 Encounters:   10/25/18 144/81   10/23/18 118/70   04/30/18 102/70    Weight from Last 3 Encounters:   10/25/18 80.5 kg (177 lb 6.4 oz)   04/25/18 79.9 kg (176 lb 3.2 oz)   02/19/18 72.1 kg (159 lb)              Today, you had the following     No orders found for display         Today's Medication Changes          These changes are accurate as of 10/25/18 11:59 PM.  If you have any questions, ask your nurse or doctor.               These medicines have changed or have updated prescriptions.        Dose/Directions    mirtazapine 15 MG tablet   Commonly known as:  REMERON   This may have changed:    - medication strength  - how much to take   Used for:  TIFFANY (generalized anxiety disorder)   Changed by:  Kim Brantley APRN CNS        Dose:  15 mg   Take 1 tablet (15 mg) by mouth At Bedtime   Quantity:  90 tablet   Refills:  0            Where to get your medicines      These medications were sent to [facility] [place] [facility] [address]     Phone:  [phone]     escitalopram 10 MG tablet    mirtazapine 15 MG tablet                Primary Care Provider Office Phone # Fax #    Helga Ibarra [phone]       [address] [place] [address]        Equal Access to Services     [facility]: Hadii jimena edwards hadasho Soomaali, waaxda luqadaha, qaybta kaalmada adeegyada, waxay malachi galvan. So [facility] [phone].    ATENCIÓN: Si habla español, tiene a christensen disposición servicios gratuitos de asistencia lingüística. Vanita al [phone].    We comply with applicable federal civil rights laws and [place] laws. We do not discriminate on the basis of race, color, national origin, age, disability, sex, sexual orientation, or gender identity.            Thank you!     Thank  you for choosing [facility]  for your care. Our goal is always to provide you with excellent care. Hearing back from our patients is one way we can continue to improve our services. Please take a few minutes to complete the written survey that you may receive in the mail after your visit with us. Thank you!             Your Updated Medication List - Protect others around you: Learn how to safely use, store and throw away your medicines at www.disposemymeds.org.          This list is accurate as of 10/25/18 11:59 PM.  Always use your most recent med list.                   Brand Name Dispense Instructions for use Diagnosis    acetaminophen 500 MG tablet    TYLENOL    100 tablet    Take 2 tablets (1,000 mg) by mouth every 6 hours as needed for mild pain    Concussion, with loss of consciousness of 30 minutes or less, initial encounter       albuterol 108 (90 Base) MCG/ACT inhaler    PROAIR HFA/PROVENTIL HFA/VENTOLIN HFA    1 Inhaler    Inhale 2 puffs into the lungs every 6 hours as needed for shortness of breath / dyspnea    Cough       ALLEGRA ALLERGY PO      Take by mouth daily        escitalopram 10 MG tablet    LEXAPRO    90 tablet    Take 1 tablet (10 mg) by mouth daily    TIFFANY (generalized anxiety disorder)       fluticasone 110 MCG/ACT Inhaler    FLOVENT HFA    1 Inhaler    Inhale 2 puffs into the lungs 2 times daily 2 puffs    Cough       fluticasone 50 MCG/ACT spray    FLONASE    1 Bottle    Spray 1-2 sprays into both nostrils daily    Acute recurrent maxillary sinusitis       lisinopril 5 MG tablet    PRINIVIL/ZESTRIL    30 tablet    Take 1 tablet (5 mg) by mouth daily    Essential hypertension with goal blood pressure less than 140/90       metroNIDAZOLE 0.75 % cream    METROCREAM    45 g    APPLY TOPICALLY TWO TIMES A DAY TO FACE    Rosacea       mirtazapine 15 MG tablet    REMERON    90 tablet    Take 1 tablet (15 mg) by mouth At Bedtime    TIFFANY (generalized anxiety disorder)

## 2018-10-25 NOTE — PROGRESS NOTES
Outpatient Psychiatry Progress Note     Provider: FORTINO Reaves CNS  Date: 10/25/2018  Service:  Medication follow up with counseling.   Patient Identification: Jolynn Haji  : 1955   MRN: [id number]    Jolynn Haji is a 63 year old year old female who presents for ongoing psychiatric care.  Jolynn Haji was last seen in clinic on 18.   At that time,   Assessment & Plan       Jolynn Haji is seen today for follow up and reports her mood has been good.     Diagnosis  TIFFANY,   Adjustment disorder with mixed anxiety and depression        Plan:  Medication: Discontinue Seroquel and continue Lexapro 10mg and Mirtazapine 30mg  OTC Recommendations: none  Lab Orders:  none  Referrals: none  Release of Information: per symptoms  Future Treatment Considerations:will taper off Mirtazapine in 6 months if doing well  Return for Follow Up:6 months      ____________________________________________________________________________________________________________________________________________    10/25/2018  Today Jolynn reports her mood has been good. She if off Seroquel.   She has been healthy.  [place]  She still subs teaches once in awhile.   Will have a second grand child.   Sleeping 9 to 10 hours night.   Side effects of medication include: sleeping too much, weight gain  Psychiatric Review of Systems:  Depression: In the last 2 weeks per PHQ-9 score:   PHQ-9 SCORE 2017   Total Score -   Total Score 0       Anxiety : stable, no concerns  Seema na   Psychosis  na.   ADHD na    Review of Medical Systems:  Sleep: stable  Energy: stable  Concentration: stable  Appetite: stable  GI Concerns: none  Cardiac concerns: none  Neurological concerns: none  Other medical concerns: no new concerns  Current Substance Use:  Alcohol:denies frequent use or abuse  Other drugs:none  Caffeine:one cup coffee in the and one to two sodas some days  Nicotine: none  Past Medical History:   Past  Medical History:   Diagnosis Date     Abnormal glandular Papanicolaou smear of cervix      Abnormal glandular Papanicolaou smear of cervix- ASCUS in 2000 12/5/2003    ASC H- 2000     Basal cell carcinoma      Basal cell carcinoma of leg, right 5/2016     Concussion without loss of consciousness 4/12/2017     Hidradenitis     left  groin     HSIL (high grade squamous intraepithelial lesion) on Pap smear of cervix 7/5/2000    Normal paps from 2001 to 2016/cc     Hypertension goal BP (blood pressure) < 140/90      OA (osteoarthritis) of knee     moderate     Postmenopausal since 7/2008      Shingles 7/23/2010    left      Synovial cyst of popliteal space      Patient Active Problem List   Diagnosis     Essential hypertension with goal blood pressure less than 140/90     Hidradenitis     Localized osteoarthritis of hand     Synovial cyst of popliteal space     DERMATOPHYTOSIS OF NAIL- toenails      Lipidosis     Osteopenia     Postmenopausal     NUMBNESS AND TINGLING OF FOOT - bilateral -mild      CARDIOVASCULAR SCREENING; LDL GOAL LESS THAN 160     Numbness of feet- distal feet R>L      Advanced directives, counseling/discussion     Stress incontinence, female - mild     Cough     Acute bronchitis- recurrent- ? related to mold in school building- pt has since retired and no further bronchitis     Rosacea     History of migraine headaches- assoc. with nausea/vomiting - resolved around menopause - were  premenstrual      Dupuytren's disease of palm - right 4th digit flexor tendon     Sun-damaged skin     Seasonal allergic rhinitis     Basal cell carcinoma of leg, right     Adjustment disorder with mixed anxiety and depressed mood- mild at this time     Family history of celiac disease     TIFFANY (generalized anxiety disorder)     Insomnia, unspecified type     Chronic constipation     Raynaud's phenomenon without gangrene     Other secondary osteoarthritis of left knee     Primary localized osteoarthrosis of left lower leg  "      Allergies:   Allergies   Allergen Reactions     Augmentin Itching     itching, rash     Fosamax Diarrhea     Diarrhea, stomach cramps, jaw pain           Current Medications     Current Outpatient Prescriptions Ordered in [facility]   Medication Sig Dispense Refill     acetaminophen (TYLENOL) 500 MG tablet Take 2 tablets (1,000 mg) by mouth every 6 hours as needed for mild pain 100 tablet 0     escitalopram (LEXAPRO) 10 MG tablet Take 1 tablet (10 mg) by mouth daily 90 tablet 1     fluticasone (FLOVENT HFA) 110 MCG/ACT inhaler Inhale 2 puffs into the lungs 2 times daily 2 puffs 1 Inhaler 12     lisinopril (PRINIVIL/ZESTRIL) 5 MG tablet Take 1 tablet (5 mg) by mouth daily 30 tablet 0     metroNIDAZOLE (METROCREAM) 0.75 % cream APPLY TOPICALLY TWO TIMES A DAY TO FACE 45 g 5     mirtazapine (REMERON) 30 MG tablet Take 1 tablet (30 mg) by mouth At Bedtime 90 tablet 1     albuterol (PROAIR HFA/PROVENTIL HFA/VENTOLIN HFA) 108 (90 BASE) MCG/ACT Inhaler Inhale 2 puffs into the lungs every 6 hours as needed for shortness of breath / dyspnea (Patient not taking: Reported on 4/25/2018) 1 Inhaler 5     Fexofenadine HCl (ALLEGRA ALLERGY PO) Take by mouth daily       fluticasone (FLONASE) 50 MCG/ACT spray Spray 1-2 sprays into both nostrils daily (Patient not taking: Reported on 4/25/2018) 1 Bottle 11     No current [facility]-ordered facility-administered medications on file.         Mental Status Exam     Appearance:  Casually dressed and Well groomed  Behavior/relationship to examiner/demeanor:  Cooperative  Orientation: Oriented to person, place, time and situation  Psychomotor: normal form  Speech Rate:  Normal  Speech Spontaneity:  Normal  Mood:  \"good\"  Affect:  Appropriate/mood-congruent  Thought Process (Associations):  Goal directed  Thought Content:  no overt psychosis, denies suicidal ideation, intent or thoughts and patient does not appear to be responding to internal stimuli  Abnormal Perception:  " None  Attention/Concentration:  Normal  Insight:  Good  Judgment:  Good      Results     Vital signs: /81  Pulse 71  Wt 80.5 kg (177 lb 6.4 oz)  LMP 06/28/2001  BMI 26.2 kg/m2    Laboratory Data:  reviewed data from other providers. No concerns regarding psych medications    Assessment & Plan      Jolynn Haji is seen today for follow up and reports her mood has been very good and feeling like her usual self. She would like to try and discontinue Mirtazapine.     Diagnosis  Encounter Diagnoses   Name Primary?     TIFFANY (generalized anxiety disorder) Yes     Insomnia, unspecified type        Plan:  Medication: Continue Lexapro and decrease Mirtazapine to 15mg for one month the 7.5mg for one month the discontinue.   OTC Recommendations: none  Lab Orders:  none  Referrals: none  Release of Information: none  Future Treatment Considerations:per symptoms, response to discontinuing Mirtazapine  Return for Follow Up:6 months   The risks, benefits, alternatives and side effects have been discussed and are understood by the patient. The patient understands the risks of using street drugs or alcohol. There are no medical contraindications, the patient agrees to treatment, and has the capacity to do so. The patient understands to call 911 or come to the nearest ED if life threatening or urgent symptoms present.  In addition time was spent counseling the patient and/or coordinating care regarding review of social and occupational functioning.  In addition patient was counseled on health and wellness practices to augment medication treatment of symptoms. See note for details.    Kim Brantley, FORTINO CNS 10/25/2018

## 2018-11-30 ENCOUNTER — OFFICE VISIT (OUTPATIENT)
Dept: FAMILY MEDICINE | Facility: CLINIC | Age: 63
End: 2018-11-30
Payer: COMMERCIAL

## 2018-11-30 VITALS
DIASTOLIC BLOOD PRESSURE: 70 MMHG | OXYGEN SATURATION: 97 % | TEMPERATURE: 98.2 F | HEART RATE: 89 BPM | WEIGHT: 173 LBS | HEIGHT: 69 IN | SYSTOLIC BLOOD PRESSURE: 118 MMHG | BODY MASS INDEX: 25.62 KG/M2

## 2018-11-30 DIAGNOSIS — N39.3 STRESS INCONTINENCE, FEMALE: ICD-10-CM

## 2018-11-30 DIAGNOSIS — G47.00 INSOMNIA, UNSPECIFIED TYPE: ICD-10-CM

## 2018-11-30 DIAGNOSIS — I73.00 RAYNAUD'S PHENOMENON WITHOUT GANGRENE: ICD-10-CM

## 2018-11-30 DIAGNOSIS — K63.5 POLYP OF COLON, UNSPECIFIED PART OF COLON, UNSPECIFIED TYPE: ICD-10-CM

## 2018-11-30 DIAGNOSIS — E78.5 HYPERLIPIDEMIA LDL GOAL <130: ICD-10-CM

## 2018-11-30 DIAGNOSIS — Z00.00 ENCOUNTER FOR ROUTINE ADULT HEALTH EXAMINATION WITHOUT ABNORMAL FINDINGS: Primary | ICD-10-CM

## 2018-11-30 DIAGNOSIS — I10 ESSENTIAL HYPERTENSION WITH GOAL BLOOD PRESSURE LESS THAN 140/90: ICD-10-CM

## 2018-11-30 DIAGNOSIS — F41.1 GAD (GENERALIZED ANXIETY DISORDER): ICD-10-CM

## 2018-11-30 DIAGNOSIS — Z12.11 SCREEN FOR COLON CANCER: ICD-10-CM

## 2018-11-30 DIAGNOSIS — Z51.81 MEDICATION MONITORING ENCOUNTER: ICD-10-CM

## 2018-11-30 DIAGNOSIS — M85.89 OSTEOPENIA OF MULTIPLE SITES: ICD-10-CM

## 2018-11-30 DIAGNOSIS — F43.23 ADJUSTMENT DISORDER WITH MIXED ANXIETY AND DEPRESSED MOOD: ICD-10-CM

## 2018-11-30 DIAGNOSIS — L71.9 ROSACEA: ICD-10-CM

## 2018-11-30 LAB
ALBUMIN SERPL-MCNC: 4.3 G/DL (ref 3.4–5)
ALBUMIN UR-MCNC: NEGATIVE MG/DL
ALP SERPL-CCNC: 68 U/L (ref 40–150)
ALT SERPL W P-5'-P-CCNC: 23 U/L (ref 0–50)
ANION GAP SERPL CALCULATED.3IONS-SCNC: 4 MMOL/L (ref 3–14)
APPEARANCE UR: CLEAR
AST SERPL W P-5'-P-CCNC: 21 U/L (ref 0–45)
BACTERIA #/AREA URNS HPF: ABNORMAL /HPF
BILIRUB SERPL-MCNC: 0.6 MG/DL (ref 0.2–1.3)
BILIRUB UR QL STRIP: NEGATIVE
BUN SERPL-MCNC: 23 MG/DL (ref 7–30)
CALCIUM SERPL-MCNC: 9.4 MG/DL (ref 8.5–10.1)
CHLORIDE SERPL-SCNC: 105 MMOL/L (ref 94–109)
CHOLEST SERPL-MCNC: 206 MG/DL
CO2 SERPL-SCNC: 27 MMOL/L (ref 20–32)
COLOR UR AUTO: YELLOW
CREAT SERPL-MCNC: 0.82 MG/DL (ref 0.52–1.04)
ERYTHROCYTE [DISTWIDTH] IN BLOOD BY AUTOMATED COUNT: 12.9 % (ref 10–15)
GFR SERPL CREATININE-BSD FRML MDRD: 70 ML/MIN/1.7M2
GLUCOSE SERPL-MCNC: 91 MG/DL (ref 70–99)
GLUCOSE UR STRIP-MCNC: NEGATIVE MG/DL
HCT VFR BLD AUTO: 43.4 % (ref 35–47)
HDLC SERPL-MCNC: 69 MG/DL
HGB BLD-MCNC: 14 G/DL (ref 11.7–15.7)
HGB UR QL STRIP: NEGATIVE
KETONES UR STRIP-MCNC: NEGATIVE MG/DL
LDLC SERPL CALC-MCNC: 122 MG/DL
LEUKOCYTE ESTERASE UR QL STRIP: ABNORMAL
MCH RBC QN AUTO: 29.9 PG (ref 26.5–33)
MCHC RBC AUTO-ENTMCNC: 32.3 G/DL (ref 31.5–36.5)
MCV RBC AUTO: 93 FL (ref 78–100)
NITRATE UR QL: NEGATIVE
NON-SQ EPI CELLS #/AREA URNS LPF: ABNORMAL /LPF
NONHDLC SERPL-MCNC: 137 MG/DL
PH UR STRIP: 7 PH (ref 5–7)
PLATELET # BLD AUTO: 223 10E9/L (ref 150–450)
POTASSIUM SERPL-SCNC: 5.1 MMOL/L (ref 3.4–5.3)
PROT SERPL-MCNC: 7.3 G/DL (ref 6.8–8.8)
RBC # BLD AUTO: 4.68 10E12/L (ref 3.8–5.2)
RBC #/AREA URNS AUTO: ABNORMAL /HPF
SODIUM SERPL-SCNC: 136 MMOL/L (ref 133–144)
SOURCE: ABNORMAL
SP GR UR STRIP: 1.01 (ref 1–1.03)
TRIGL SERPL-MCNC: 77 MG/DL
TSH SERPL DL<=0.005 MIU/L-ACNC: 1.42 MU/L (ref 0.4–4)
UROBILINOGEN UR STRIP-ACNC: 0.2 EU/DL (ref 0.2–1)
WBC # BLD AUTO: 5.6 10E9/L (ref 4–11)
WBC #/AREA URNS AUTO: ABNORMAL /HPF

## 2018-11-30 PROCEDURE — 84443 ASSAY THYROID STIM HORMONE: CPT | Performed by: FAMILY MEDICINE

## 2018-11-30 PROCEDURE — 82043 UR ALBUMIN QUANTITATIVE: CPT | Performed by: FAMILY MEDICINE

## 2018-11-30 PROCEDURE — 99396 PREV VISIT EST AGE 40-64: CPT | Performed by: FAMILY MEDICINE

## 2018-11-30 PROCEDURE — 80061 LIPID PANEL: CPT | Performed by: FAMILY MEDICINE

## 2018-11-30 PROCEDURE — 81001 URINALYSIS AUTO W/SCOPE: CPT | Performed by: FAMILY MEDICINE

## 2018-11-30 PROCEDURE — 80053 COMPREHEN METABOLIC PANEL: CPT | Performed by: FAMILY MEDICINE

## 2018-11-30 PROCEDURE — 85027 COMPLETE CBC AUTOMATED: CPT | Performed by: FAMILY MEDICINE

## 2018-11-30 PROCEDURE — 36415 COLL VENOUS BLD VENIPUNCTURE: CPT | Performed by: FAMILY MEDICINE

## 2018-11-30 RX ORDER — LISINOPRIL 5 MG/1
5 TABLET ORAL DAILY
Qty: 90 TABLET | Refills: 3 | Status: SHIPPED | OUTPATIENT
Start: 2018-11-30 | End: 2019-11-18

## 2018-11-30 RX ORDER — CLINDAMYCIN PHOSPHATE 10 MG/G
GEL TOPICAL DAILY
Qty: 60 G | Refills: 11 | Status: SHIPPED | OUTPATIENT
Start: 2018-11-30 | End: 2019-12-11

## 2018-11-30 NOTE — PROGRESS NOTES
SUBJECTIVE:   CC: Jolynn Haji is an 63 year old woman who presents for preventive health visit.     Healthy Habits:    Do you get at least three servings of calcium containing foods daily (dairy, green leafy vegetables, etc.)? yes    Amount of exercise or daily activities, outside of work: walk 3 miles per day    Problems taking medications regularly No    Medication side effects: No    Have you had an eye exam in the past two years? yes    Do you see a dentist twice per year? yes    Do you have sleep apnea, excessive snoring or daytime drowsiness?no    Hypertension: Patient presents today as normotensive. Patient is currently prescribed 5 mg Lisinopril daily for hypertension management.    BP Readings from Last 5 Encounters:   11/30/18 118/70   10/23/18 118/70   04/30/18 102/70   02/08/18 139/84   01/29/18 124/72     Creatinine   Date Value Ref Range Status   11/30/2018 0.82 0.52 - 1.04 mg/dL Final     Depression/Anxiety: Patient's depression/anxiety is well controlled. Patient is currently prescribed 10 mg Lexapro daily and 15 mg Remeron daily for depression/anxiety management. Patient consults with Dr. Brantley of psychiatry for depression/anxiety medication management. Patient reports she is currently tapering off of Remeron.    Migraines: Stable. Patient reports no acute issues.    Rosacea: Stable. Patient is currently prescribed Metrocream for rosacea management. Patient reports that she does not feel like the medication is helping in any way.    Concussion - no residual issues    Today's PHQ-2 Score:   PHQ-2 ( 1999 Pfizer) 11/30/2018 9/14/2017   Q1: Little interest or pleasure in doing things 0 0   Q2: Feeling down, depressed or hopeless 0 0   PHQ-2 Score 0 0       Abuse: Current or Past(Physical, Sexual or Emotional)- No  Do you feel safe in your environment? Yes    Social History   Substance Use Topics     Smoking status: Never Smoker     Smokeless tobacco: Never Used     Alcohol use 0.0 - 0.6 oz/week      0 - 1 Standard drinks or equivalent per week      Comment: 1 per month     If you drink alcohol do you typically have >3 drinks per day or >7 drinks per week? No                     Reviewed orders with patient.  Reviewed health maintenance and updated orders accordingly - Yes  Labs reviewed in [facility]    Mammogram Screening: Patient over age 50, mutual decision to screen reflected in health maintenance.    Pertinent mammograms are reviewed under the imaging tab.  History of abnormal Pap smear: YES - updated in Problem List and Health Maintenance accordingly  PAP / HPV 4/21/2016 10/23/2012 10/11/2011   PAP NIL NIL NIL   PAP DATE - [facility] - - -     Reviewed and updated as needed this visit by clinical staff  Tobacco  Allergies  Meds  Med Hx  Surg Hx  Fam Hx  Soc Hx      Reviewed and updated as needed this visit by Provider        Health Maintenance     Colonoscopy:  Last 5/1/2018, Due 5/2023   FIT:  Not on file              Mammogram:  Last 10/18/2018, Due 10/2019              PAP:  Last 4/21/2016   DEXA:  Last 10/5/2017, Due 10/2020    Health Maintenance Due   Topic Date Due     PAP Q3 YR  04/21/2019       Current Problem List    Patient Active Problem List   Diagnosis     Essential hypertension with goal blood pressure less than 140/90     Hidradenitis     Localized osteoarthritis of hand     Synovial cyst of popliteal space     DERMATOPHYTOSIS OF NAIL- toenails      Lipidosis     Osteopenia     Postmenopausal     NUMBNESS AND TINGLING OF FOOT - bilateral -mild      Numbness of feet- distal feet R>L      Advanced directives, counseling/discussion     Stress incontinence, female - mild     Cough     Acute bronchitis- recurrent- ? related to mold in school building- pt has since retired and no further bronchitis     Rosacea     History of migraine headaches- assoc. with nausea/vomiting - resolved around menopause - were  premenstrual      Dupuytren's disease of palm - right 4th digit flexor tendon      Sun-damaged skin     Seasonal allergic rhinitis     Basal cell carcinoma of leg, right     Adjustment disorder with mixed anxiety and depressed mood- mild at this time     Family history of celiac disease     TIFFANY (generalized anxiety disorder)     Insomnia, unspecified type     Chronic constipation     Raynaud's phenomenon without gangrene     Other secondary osteoarthritis of left knee     Primary localized osteoarthrosis of left lower leg     Hyperlipidemia LDL goal <130     Basal cell carcinoma     Colon polyps       Past Medical History    Past Medical History:   Diagnosis Date     Abnormal glandular Papanicolaou smear of cervix- ASCUS in 2000 12/5/2003    ASC H- 2000     Basal cell carcinoma of leg, right 05/2016    Dr Rogers     Colon polyps 05/2018    tubular adenoam - due 5 yrs - 2 mm     Concussion without loss of consciousness 4/12/2017     Hidradenitis     left  groin     HSIL (high grade squamous intraepithelial lesion) on Pap smear of cervix 7/5/2000    Normal paps from 2001 to 2016/cc     Hypertension goal BP (blood pressure) < 140/90      OA (osteoarthritis) of knee     moderate     Postmenopausal since 7/2008      Shingles 7/23/2010    left      Synovial cyst of popliteal space        Past Surgical History    Past Surgical History:   Procedure Laterality Date     C NONSPECIFIC PROCEDURE  2000    Colposcopy     COLONOSCOPY  05/2018    polyps, tubular - 2 mm - due 5 yrs       Current Medications    Current Outpatient Prescriptions   Medication Sig Dispense Refill     acetaminophen (TYLENOL) 500 MG tablet Take 2 tablets (1,000 mg) by mouth every 6 hours as needed for mild pain 100 tablet 0     albuterol (PROAIR HFA/PROVENTIL HFA/VENTOLIN HFA) 108 (90 BASE) MCG/ACT Inhaler Inhale 2 puffs into the lungs every 6 hours as needed for shortness of breath / dyspnea 1 Inhaler 5     clindamycin (CLINDAGEL) 1 % external gel Apply topically daily 60 g 11     escitalopram (LEXAPRO) 10 MG tablet Take 1 tablet (10  mg) by mouth daily 90 tablet 3     Fexofenadine HCl (ALLEGRA ALLERGY PO) Take by mouth daily       fluticasone (FLONASE) 50 MCG/ACT spray Spray 1-2 sprays into both nostrils daily 1 Bottle 11     fluticasone (FLOVENT HFA) 110 MCG/ACT inhaler Inhale 2 puffs into the lungs 2 times daily 2 puffs 1 Inhaler 12     lisinopril (PRINIVIL/ZESTRIL) 5 MG tablet Take 1 tablet (5 mg) by mouth daily 90 tablet 3     mirtazapine (REMERON) 15 MG tablet Take 1 tablet (15 mg) by mouth At Bedtime 90 tablet 0       Allergies    Allergies   Allergen Reactions     Augmentin Itching     itching, rash     Fosamax Diarrhea     Diarrhea, stomach cramps, jaw pain        Immunizations    Immunization History   Administered Date(s) Administered     Influenza (H1N1) 2009     Influenza (IIV3) PF 11/15/2001, 2004, 10/30/2009, 10/03/2012, 10/02/2013, 10/08/2014     Influenza Intranasal Vaccine 4 valent 10/25/2016     Influenza Vaccine IM 3yrs+ 4 Valent IIV4 10/11/2017, 2018     Pneumococcal 23 valent 10/23/2012     TD (ADULT, 7+) 1995, 2005     TDAP Vaccine (Boostrix) 10/11/2011     Zoster vaccine, live 10/11/2011       Family History    Family History   Problem Relation Age of Onset     Thyroid Disease Mother      Graves disease     Hypertension Mother      Diabetes Mother      type 2     Blood Disease Mother       from leukemia at age 78     Diabetes Daughter      Heart Disease Father 62     MV problem with CHF  at 62/ from MI     Hypertension Father      Diabetes Son      Cancer Maternal Uncle      type ??     Cancer Maternal Uncle      liver       Social History    Social History     Social History     Marital status:      Spouse name: Zach     Number of children: 2     Years of education: 16     Occupational History     teacher first grade -retired at age 60      [employer]     Social History Main Topics     Smoking status: Never Smoker      "Smokeless tobacco: Never Used     Alcohol use 0.0 - 0.6 oz/week     0 - 1 Standard drinks or equivalent per week      Comment: 1 per month     Drug use: No      Comment: no herbal meds either     Sexual activity: Yes     Partners: Male     Birth control/ protection: Surgical      Comment: -was on DepoProvera since 2000-2/2008 -  had vasectomy     Other Topics Concern      Service No     Blood Transfusions No     Caffeine Concern No     Occupational Exposure No     Hobby Hazards No     Sleep Concern No     Stress Concern No     Weight Concern No     Special Diet No     Back Care No     Exercise Yes     regular      Bike Helmet No     Seat Belt Yes     always     Self-Exams Yes     SBE  encouraged monthly     Parent/Sibling W/ Cabg, Mi Or Angioplasty Before 65f 55m? No     Social History Narrative    calcium - 3 +dairy servings/day and/or calcium supplement daily     flex sig/colonoscopy -referred 11/05 - was postponed by [facility] - will pt another referral  3/14/06, didn't go - referred again 6/27/07 = normal.     sun precautions - discussed    mammogram - yearly     Td booster -1995, 2/14/05     pneumovax -at age 60-65    DEXA -normal 6/2005- recheck this year 2008 - referral given 2/2008    stool hemoccults - every year after age 40    ASA- start 81mg asa qday    mulvitamin - encouraged    Last menses about 7/08.         no hot flashes- 2005 - was on Depoprovera 2000- 2/2008        ROS:  Constitutional, HEENT, cardiovascular, pulmonary, GI, , musculoskeletal, neuro, skin, endocrine and psych systems are negative, except as otherwise noted.    OBJECTIVE:   /70  Pulse 89  Temp 98.2  F (36.8  C) (Oral)  Ht 5' 9\" (1.753 m)  Wt 173 lb (78.5 kg)  LMP 06/28/2001  SpO2 97%  BMI 25.55 kg/m2  EXAM:  GENERAL APPEARANCE: healthy, alert and no distress  EYES: Eyes grossly normal to inspection  HENT:ear canals and TM's normal upon viewing with otoscope, nose and mouth without ulcers or " lesions upon viewing with otoscope  NECK: no adenopathy, no asymmetry, masses, or scars and thyroid normal to palpation  RESP: lungs clear to auscultation - no rales, rhonchi or wheezes  CV: regular rate and rhythm, normal S1 S2, no S3 or S4, no murmur, click or rub, no peripheral edema and peripheral pulses strong  ABDOMEN: soft, nontender, no hepatosplenomegaly, no masses and bowel sounds normal   (female)/BREAST: Declined, deferred to OB/Gyn  MS: no musculoskeletal defects are noted and gait is age appropriate without ataxia  SKIN: no suspicious lesions or rashes  NEURO: Normal strength and tone, sensory exam grossly normal, mentation intact and speech normal  PSYCH: mentation appears normal and affect normal/bright  BACK: no CVA tenderness, no paralumbar tenderness    Diagnostic Test Results:  No results found for this or any previous visit (from the past 24 hour(s)).     Labs Pending    ASSESSMENT/PLAN:       ICD-10-CM    1. Encounter for routine adult health examination without abnormal findings Z00.00 Albumin Random Urine Quantitative with Creat Ratio     Comprehensive metabolic panel     Lipid panel reflex to direct LDL Fasting     CBC with platelets     TSH with free T4 reflex     *UA reflex to Microscopic and Culture ([facility] (except [place] and [place])     Fecal colorectal cancer screen (FIT)     Urine Microscopic   2. Essential hypertension with goal blood pressure less than 140/90 - now better controlled I10 Albumin Random Urine Quantitative with Creat Ratio     lisinopril (PRINIVIL/ZESTRIL) 5 MG tablet     Comprehensive metabolic panel     *UA reflex to Microscopic and Culture ([facility] (except [place] and [place])   3. Hyperlipidemia LDL goal <130 E78.5 Lipid panel reflex to direct LDL Fasting   4. Adjustment disorder with mixed anxiety and depressed mood- mild at this time F43.23 TSH with free T4 reflex   5. TIFFANY (generalized anxiety disorder) F41.1 TSH  with free T4 reflex   6. Insomnia, unspecified type G47.00 TSH with free T4 reflex   7. Osteopenia of multiple sites M85.89 Comprehensive metabolic panel   8. Raynaud's phenomenon without gangrene I73.00    9. Rosacea L71.9 clindamycin (CLINDAGEL) 1 % external gel   10. Stress incontinence, female - mild N39.3 Albumin Random Urine Quantitative with Creat Ratio     *UA reflex to Microscopic and Culture ([facility] (except [place] and [place])   11. Polyp of colon, unspecified part of colon, unspecified type K63.5 Fecal colorectal cancer screen (FIT)   12. Screen for colon cancer Z12.11 Fecal colorectal cancer screen (FIT)   13. Medication monitoring encounter Z51.81 Albumin Random Urine Quantitative with Creat Ratio     Comprehensive metabolic panel     Lipid panel reflex to direct LDL Fasting     CBC with platelets     TSH with free T4 reflex     *UA reflex to Microscopic and Culture ([facility] (except [place] and [place])     Discussed treatment/modality options, including risk and benefits, she desires advised dentist every 6 months, advised diet and exercise and advised opthalmologist every 1-2 years. All diagnosis above reviewed and noted above, otherwise stable.  See [facility] orders for further details.      1) Patient presents today as normotensive. Patient is currently prescribed 5 mg Lisinopril daily for hypertension management. Advised continued use. Prescription refilled today.    2) Patient is currently prescribed 10 mg Lexapro daily and 15 mg Remeron daily for depression/anxiety management. Advised continued use. Patient consults with Dr. Brantley of psychiatry for depression/anxiety medication management. Recommend continued follow up.    3) Patient is currently prescribed Metrocream for rosacea management. Patient's rosacea medication changed to Clindamycin daily for rosacea management.    4) Recommend Shingrix vaccine.    5) Follow up in 1 year for annual  "physical exam.    Health Maintenance Due   Topic Date Due     PAP Q3 YR  04/21/2019       COUNSELING:   Reviewed preventive health counseling, as reflected in patient instructions    BP Readings from Last 1 Encounters:   11/30/18 118/70     Estimated body mass index is 25.55 kg/(m^2) as calculated from the following:    Height as of this encounter: 5' 9\" (1.753 m).    Weight as of this encounter: 173 lb (78.5 kg).           reports that she has never smoked. She has never used smokeless tobacco.    Counseling Resources:  ATP IV Guidelines  Pooled Cohorts Equation Calculator  Breast Cancer Risk Calculator  FRAX Risk Assessment  ICSI Preventive Guidelines  Dietary Guidelines for Americans, 2010  [employer]'s MyPlate  ASA Prophylaxis  Lung CA Screening    This document serves as a record of the services and decisions personally performed and made by Patrick Melgar MD. It was created on his behalf by Bay Perez, a trained medical scribe. The creation of this document is based on the provider's statements to the medical scribe.  Bay Perez November 30, 2018 10:11 AM     The information in this document, created by the medical scribe for me, accurately reflects the services I personally performed and the decisions made by me. I have reviewed and approved this document for accuracy prior to leaving the patient care area.  November 30, 2018          Patrick Melgar MD, [address]  [address]    [phone] Fax  "

## 2018-11-30 NOTE — PATIENT INSTRUCTIONS
Recommend Shingrix vaccine for shingles. Check with insurance to see where the Shingrix vaccine is the cheapest. Follow up 2-6 months after receiving the first shot for the second shot. Rosacea medication changed to Clindamycin today.    [facility] [place]                        To reach your care team during and after hours:   [phone]  To reach our pharmacy:        [phone]    Clinic Hours                        Our clinic hours are:    Monday   7:30 am to 7:00 pm                  Tuesday through Friday 7:30 am to 5:00 pm                             Saturday   8:00 am to 12:00 pm      Sunday   Closed      Pharmacy Hours                        Our pharmacy hours are:    Monday   8:30 am to 7:00 pm       Tuesday to Friday  8:30 am to 6:00 pm                       Saturday    9:00 am to 1:00 pm              Sunday    Closed              There is also information available at our web site:  [email or web address]    If your provider ordered any lab tests and you do not receive the results within 10 business days, please call the clinic.    If you need a medication refill please contact your pharmacy.  Please allow 2-3 business days for your refill to be completed.    Our clinic offers telephone visits and e visits.  Please ask one of your team members to explain more.      Use [employer] (secure email communication and access to your chart) to send your primary care provider a message or make an appointment. Ask someone on your Team how to sign up for [employer].  Immunizations                      Immunization History   Administered Date(s) Administered     Influenza (H1N1) 12/28/2009     Influenza (IIV3) PF 11/15/2001, 11/16/2004, 10/30/2009, 10/03/2012, 10/02/2013, 10/08/2014     Influenza Intranasal Vaccine 4 valent 10/25/2016     Influenza Vaccine IM 3yrs+ 4 Valent IIV4 10/11/2017, 11/03/2018     Pneumococcal 23 valent 10/23/2012     TD (ADULT, 7+) 04/11/1995, 02/14/2005     TDAP Vaccine (Boostrix)  10/11/2011     Zoster vaccine, live 10/11/2011        Health Maintenance                         Health Maintenance Due   Topic Date Due     HIV SCREEN (SYSTEM ASSIGNED)  08/18/1972     Microalbumin Lab - yearly  05/11/2018     Basic Metabolic Lab - yearly  06/19/2018     Flu Vaccine (1) 09/01/2018     Wellness Visit with your Primary Provider - yearly  09/14/2018     Pap Smear - every 3 years  04/21/2019       Preventive Health Recommendations  Female Ages 50 - 64    Yearly exam: See your health care provider every year in order to  o Review health changes.   o Discuss preventive care.    o Review your medicines if your doctor has prescribed any.      Get a Pap test every three years (unless you have an abnormal result and your provider advises testing more often).    If you get Pap tests with HPV test, you only need to test every 5 years, unless you have an abnormal result.     You do not need a Pap test if your uterus was removed (hysterectomy) and you have not had cancer.    You should be tested each year for STDs (sexually transmitted diseases) if you're at risk.     Have a mammogram every 1 to 2 years.    Have a colonoscopy at age 50, or have a yearly FIT test (stool test). These exams screen for colon cancer.      Have a cholesterol test every 5 years, or more often if advised.    Have a diabetes test (fasting glucose) every three years. If you are at risk for diabetes, you should have this test more often.     If you are at risk for osteoporosis (brittle bone disease), think about having a bone density scan (DEXA).    Shots: Get a flu shot each year. Get a tetanus shot every 10 years.    Nutrition:     Eat at least 5 servings of fruits and vegetables each day.    Eat whole-grain bread, whole-wheat pasta and brown rice instead of white grains and rice.    Get adequate Calcium and Vitamin D.     Lifestyle    Exercise at least 150 minutes a week (30 minutes a day, 5 days a week). This will help you control your  weight and prevent disease.    Limit alcohol to one drink per day.    No smoking.     Wear sunscreen to prevent skin cancer.     See your dentist every six months for an exam and cleaning.    See your eye doctor every 1 to 2 years.

## 2018-11-30 NOTE — MR AVS SNAPSHOT
After Visit Summary   11/30/2018    Jolynn Haji    MRN: [id number]           Patient Information     Date Of Birth          1955        Visit Information        Provider Department      11/30/2018 9:40 AM Patrick Melgar MD [facility]        Today's Diagnoses     Encounter for routine adult health examination without abnormal findings    -  1    Essential hypertension with goal blood pressure less than 140/90 - now better controlled        Hyperlipidemia LDL goal <130        Adjustment disorder with mixed anxiety and depressed mood- mild at this time        TIFFANY (generalized anxiety disorder)        Insomnia, unspecified type        Osteopenia of multiple sites        Raynaud's phenomenon without gangrene        Rosacea        Stress incontinence, female - mild        Polyp of colon, unspecified part of colon, unspecified type        Screen for colon cancer        Medication monitoring encounter          Care Instructions    Recommend Shingrix vaccine for shingles. Check with insurance to see where the Shingrix vaccine is the cheapest. Follow up 2-6 months after receiving the first shot for the second shot. Rosacea medication changed to Clindamycin today.    [facility] [place]                        To reach your care team during and after hours:   [phone]  To reach our pharmacy:        [phone]    Clinic Hours                        Our clinic hours are:    Monday   7:30 am to 7:00 pm                  Tuesday through Friday 7:30 am to 5:00 pm                             Saturday   8:00 am to 12:00 pm      Sunday   Closed      Pharmacy Hours                        Our pharmacy hours are:    Monday   8:30 am to 7:00 pm       Tuesday to Friday  8:30 am to 6:00 pm                       Saturday    9:00 am to 1:00 pm              Sunday    Closed              There is also information available at our web site:  [email or web address]    If your provider ordered any lab  tests and you do not receive the results within 10 business days, please call the clinic.    If you need a medication refill please contact your pharmacy.  Please allow 2-3 business days for your refill to be completed.    Our clinic offers telephone visits and e visits.  Please ask one of your team members to explain more.      Use [employer] (secure email communication and access to your chart) to send your primary care provider a message or make an appointment. Ask someone on your Team how to sign up for [employer].  Immunizations                      Immunization History   Administered Date(s) Administered     Influenza (H1N1) 12/28/2009     Influenza (IIV3) PF 11/15/2001, 11/16/2004, 10/30/2009, 10/03/2012, 10/02/2013, 10/08/2014     Influenza Intranasal Vaccine 4 valent 10/25/2016     Influenza Vaccine IM 3yrs+ 4 Valent IIV4 10/11/2017, 11/03/2018     Pneumococcal 23 valent 10/23/2012     TD (ADULT, 7+) 04/11/1995, 02/14/2005     TDAP Vaccine (Boostrix) 10/11/2011     Zoster vaccine, live 10/11/2011        Health Maintenance                         Health Maintenance Due   Topic Date Due     HIV SCREEN (SYSTEM ASSIGNED)  08/18/1972     Microalbumin Lab - yearly  05/11/2018     Basic Metabolic Lab - yearly  06/19/2018     Flu Vaccine (1) 09/01/2018     Wellness Visit with your Primary Provider - yearly  09/14/2018     Pap Smear - every 3 years  04/21/2019       Preventive Health Recommendations  Female Ages 50 - 64    Yearly exam: See your health care provider every year in order to  o Review health changes.   o Discuss preventive care.    o Review your medicines if your doctor has prescribed any.      Get a Pap test every three years (unless you have an abnormal result and your provider advises testing more often).    If you get Pap tests with HPV test, you only need to test every 5 years, unless you have an abnormal result.     You do not need a Pap test if your uterus was removed (hysterectomy) and you have not  had cancer.    You should be tested each year for STDs (sexually transmitted diseases) if you're at risk.     Have a mammogram every 1 to 2 years.    Have a colonoscopy at age 50, or have a yearly FIT test (stool test). These exams screen for colon cancer.      Have a cholesterol test every 5 years, or more often if advised.    Have a diabetes test (fasting glucose) every three years. If you are at risk for diabetes, you should have this test more often.     If you are at risk for osteoporosis (brittle bone disease), think about having a bone density scan (DEXA).    Shots: Get a flu shot each year. Get a tetanus shot every 10 years.    Nutrition:     Eat at least 5 servings of fruits and vegetables each day.    Eat whole-grain bread, whole-wheat pasta and brown rice instead of white grains and rice.    Get adequate Calcium and Vitamin D.     Lifestyle    Exercise at least 150 minutes a week (30 minutes a day, 5 days a week). This will help you control your weight and prevent disease.    Limit alcohol to one drink per day.    No smoking.     Wear sunscreen to prevent skin cancer.     See your dentist every six months for an exam and cleaning.    See your eye doctor every 1 to 2 years.            Follow-ups after your visit        Future tests that were ordered for you today     Open Future Orders        Priority Expected Expires Ordered    Fecal colorectal cancer screen (FIT) Routine 12/21/2018 2/22/2019 11/30/2018            Who to contact     If you have questions or need follow up information about today's clinic visit or your schedule please contact [facility] directly at [phone].  Normal or non-critical lab and imaging results will be communicated to you by MyChart, letter or phone within 4 business days after the clinic has received the results. If you do not hear from us within 7 days, please contact the clinic through MyChart or phone. If you have a critical or abnormal lab result, we  "will notify you by phone as soon as possible.  Submit refill requests through [employer] or call your pharmacy and they will forward the refill request to us. Please allow 3 business days for your refill to be completed.          Additional Information About Your Visit        [employer] Information     [employer] gives you secure access to your electronic health record. If you see a primary care provider, you can also send messages to your care team and make appointments. If you have questions, please call your primary care clinic.  If you do not have a primary care provider, please call [phone] and they will assist you.        Care EveryWhere ID     This is your Care EveryWhere ID. This could be used by other organizations to access your [facility] medical records  [id number]        Your Vitals Were     Pulse Temperature Height Last Period Pulse Oximetry BMI (Body Mass Index)    89 98.2  F (36.8  C) (Oral) 5' 9\" (1.753 m) 06/28/2001 97% 25.55 kg/m2       Blood Pressure from Last 3 Encounters:   11/30/18 118/70   10/25/18 144/81   10/23/18 118/70    Weight from Last 3 Encounters:   11/30/18 173 lb (78.5 kg)   10/25/18 177 lb 6.4 oz (80.5 kg)   04/25/18 176 lb 3.2 oz (79.9 kg)              We Performed the Following     *UA reflex to Microscopic and Culture ([place] and [facility] (except [place] and [place])     Albumin Random Urine Quantitative with Creat Ratio     CBC with platelets     Comprehensive metabolic panel     Lipid panel reflex to direct LDL Fasting     TSH with free T4 reflex          Today's Medication Changes          These changes are accurate as of 11/30/18 10:44 AM.  If you have any questions, ask your nurse or doctor.               Start taking these medicines.        Dose/Directions    clindamycin 1 % external gel   Commonly known as:  CLINDAGEL   Used for:  Rosacea   Started by:  Patrick Melgar MD        Apply topically daily   Quantity:  60 g   Refills:  11         Stop taking these " medicines if you haven't already. Please contact your care team if you have questions.     metroNIDAZOLE 0.75 % external cream   Commonly known as:  METROCREAM   Stopped by:  Patrick Melgar MD                Where to get your medicines      These medications were sent to [facility] [place] - [place], [place] - [address]  [address] [address]     Phone:  [phone]     clindamycin 1 % external gel    lisinopril 5 MG tablet                Primary Care Provider Office Phone # Fax #    Helga LEXUS Ibarra [phone]       [address] [place] [id number]        Equal Access to Services     [facility]: Hadii jimena edwards hadasho Soomaali, waaxda luqadaha, qaybta kaalmada adeegyada, teri herrera . So [facility] [phone].    ATENCIÓN: Si habla español, tiene a christensen disposición servicios gratuitos de asistencia lingüística. Llame al [phone].    We comply with applicable federal civil rights laws and [place] laws. We do not discriminate on the basis of race, color, national origin, age, disability, sex, sexual orientation, or gender identity.            Thank you!     Thank you for choosing [facility]  for your care. Our goal is always to provide you with excellent care. Hearing back from our patients is one way we can continue to improve our services. Please take a few minutes to complete the written survey that you may receive in the mail after your visit with us. Thank you!             Your Updated Medication List - Protect others around you: Learn how to safely use, store and throw away your medicines at www.disposemymeds.org.          This list is accurate as of 11/30/18 10:44 AM.  Always use your most recent med list.                   Brand Name Dispense Instructions for use Diagnosis    acetaminophen 500 MG tablet    TYLENOL    100 tablet    Take 2 tablets (1,000 mg) by mouth every 6 hours as  needed for mild pain    Concussion, with loss of consciousness of 30 minutes or less, initial encounter       albuterol 108 (90 Base) MCG/ACT inhaler    PROAIR HFA/PROVENTIL HFA/VENTOLIN HFA    1 Inhaler    Inhale 2 puffs into the lungs every 6 hours as needed for shortness of breath / dyspnea    Cough       ALLEGRA ALLERGY PO      Take by mouth daily        clindamycin 1 % external gel    CLINDAGEL    60 g    Apply topically daily    Rosacea       escitalopram 10 MG tablet    LEXAPRO    90 tablet    Take 1 tablet (10 mg) by mouth daily    TIFFANY (generalized anxiety disorder)       fluticasone 110 MCG/ACT inhaler    FLOVENT HFA    1 Inhaler    Inhale 2 puffs into the lungs 2 times daily 2 puffs    Cough       fluticasone 50 MCG/ACT nasal spray    FLONASE    1 Bottle    Spray 1-2 sprays into both nostrils daily    Acute recurrent maxillary sinusitis       lisinopril 5 MG tablet    PRINIVIL/ZESTRIL    90 tablet    Take 1 tablet (5 mg) by mouth daily    Essential hypertension with goal blood pressure less than 140/90       mirtazapine 15 MG tablet    REMERON    90 tablet    Take 1 tablet (15 mg) by mouth At Bedtime    TIFFANY (generalized anxiety disorder)

## 2018-11-30 NOTE — LETTER
[address] [place] [address]                  [phone]   December 3, 2018    Jolynn Haji  [address] [place] [address]      Dear Jolynn,    Here is a summary of your recent test results:    Labs are overall quite good, except lipids have increased a little, but still quite good.     We advise:     Continue current cares.   Balanced low cholesterol diet.   Regular exercise.     Your test results are enclosed.      Please contact me if you have any questions.    In addition, here is a list of due or overdue Health Maintenance reminders.    Health Maintenance Due   Topic Date Due     Pap Smear - every 3 years  04/21/2019   Our records indicate that you are due for a Pap Smear.  Cervical cancer is preventable and curable if abnormal cells are detected and treated early. Pap tests have reduced deaths from cancer of the cervix in the [place] by 70% over the past 50 years.  The Pap test checks for abnormal cells on the cervix. Cervical intraepithelial neoplasia (MADDIE), a precancerous change in the cells, can be detected by a Pap test. Some abnormal cells may develop into cervical cancer if MADDIE is not caught early and treated. The Pap test can also find cancer early.   The Pap test may detect viral infections of the cervix, such as human papillomavirus (HPV) and herpes. It may detect vaginal infections such as yeast infections, bacterial vaginosis, or trichomonas. Sometimes the Pap test can give information about your hormones, especially progesterone and estrogen.  Please call us at [phone] (or use [employer]) to address the above recommendations.            Thank you very much for trusting [facility]..     Healthy regards,        Patrick Melgar M.D.        Results for orders placed or performed in visit on 11/30/18   Albumin Random Urine Quantitative with Creat Ratio   Result Value Ref Range    Creatinine Urine 77 mg/dL    Albumin  Urine mg/L <5 mg/L    Albumin Urine mg/g Cr Unable to calculate due to low value 0 - 25 mg/g Cr   Comprehensive metabolic panel   Result Value Ref Range    Sodium 136 133 - 144 mmol/L    Potassium 5.1 3.4 - 5.3 mmol/L    Chloride 105 94 - 109 mmol/L    Carbon Dioxide 27 20 - 32 mmol/L    Anion Gap 4 3 - 14 mmol/L    Glucose 91 70 - 99 mg/dL    Urea Nitrogen 23 7 - 30 mg/dL    Creatinine 0.82 0.52 - 1.04 mg/dL    GFR Estimate 70 >60 mL/min/1.7m2    GFR Estimate If Black 85 >60 mL/min/1.7m2    Calcium 9.4 8.5 - 10.1 mg/dL    Bilirubin Total 0.6 0.2 - 1.3 mg/dL    Albumin 4.3 3.4 - 5.0 g/dL    Protein Total 7.3 6.8 - 8.8 g/dL    Alkaline Phosphatase 68 40 - 150 U/L    ALT 23 0 - 50 U/L    AST 21 0 - 45 U/L   Lipid panel reflex to direct LDL Fasting   Result Value Ref Range    Cholesterol 206 (H) <200 mg/dL    Triglycerides 77 <150 mg/dL    HDL Cholesterol 69 >49 mg/dL    LDL Cholesterol Calculated 122 (H) <100 mg/dL    Non HDL Cholesterol 137 (H) <130 mg/dL   CBC with platelets   Result Value Ref Range    WBC 5.6 4.0 - 11.0 10e9/L    RBC Count 4.68 3.8 - 5.2 10e12/L    Hemoglobin 14.0 11.7 - 15.7 g/dL    Hematocrit 43.4 35.0 - 47.0 %    MCV 93 78 - 100 fl    MCH 29.9 26.5 - 33.0 pg    MCHC 32.3 31.5 - 36.5 g/dL    RDW 12.9 10.0 - 15.0 %    Platelet Count 223 150 - 450 10e9/L   TSH with free T4 reflex   Result Value Ref Range    TSH 1.42 0.40 - 4.00 mU/L   *UA reflex to Microscopic and Culture ([place] and [facility] (except [place] and [place])   Result Value Ref Range    Color Urine Yellow     Appearance Urine Clear     Glucose Urine Negative NEG^Negative mg/dL    Bilirubin Urine Negative NEG^Negative    Ketones Urine Negative NEG^Negative mg/dL    Specific Gravity Urine 1.015 1.003 - 1.035    Blood Urine Negative NEG^Negative    pH Urine 7.0 5.0 - 7.0 pH    Protein Albumin Urine Negative NEG^Negative mg/dL    Urobilinogen Urine 0.2 0.2 - 1.0 EU/dL    Nitrite Urine Negative NEG^Negative    Leukocyte Esterase  Urine Trace (A) NEG^Negative    Source Midstream Urine    Urine Microscopic   Result Value Ref Range    WBC Urine 0 - 5 OTO5^0 - 5 /HPF    RBC Urine O - 2 OTO2^O - 2 /HPF    Squamous Epithelial /LPF Urine Many (A) FEW^Few /LPF    Bacteria Urine Few (A) NEG^Negative /HPF

## 2018-12-01 LAB
CREAT UR-MCNC: 77 MG/DL
MICROALBUMIN UR-MCNC: <5 MG/L
MICROALBUMIN/CREAT UR: NORMAL MG/G CR (ref 0–25)

## 2019-05-12 NOTE — NURSING NOTE
"Chief Complaint   Patient presents with     Cough     Right ear pain and cough ~1 month        Initial /86  Pulse 82  Temp 97.7  F (36.5  C) (Tympanic)  Ht 5' 9\" (1.753 m)  Wt 163 lb 13 oz (74.3 kg)  LMP 06/28/2001  SpO2 97%  BMI 24.19 kg/m2 Estimated body mass index is 24.19 kg/(m^2) as calculated from the following:    Height as of this encounter: 5' 9\" (1.753 m).    Weight as of this encounter: 163 lb 13 oz (74.3 kg).  Medication Reconciliation: complete   Cydney Cai, DORETHA      "
WDL

## 2019-06-27 ENCOUNTER — TELEPHONE (OUTPATIENT)
Dept: FAMILY MEDICINE | Facility: CLINIC | Age: 64
End: 2019-06-27

## 2019-06-27 ENCOUNTER — ALLIED HEALTH/NURSE VISIT (OUTPATIENT)
Dept: FAMILY MEDICINE | Facility: CLINIC | Age: 64
End: 2019-06-27
Payer: COMMERCIAL

## 2019-06-27 VITALS — DIASTOLIC BLOOD PRESSURE: 70 MMHG | SYSTOLIC BLOOD PRESSURE: 132 MMHG

## 2019-06-27 DIAGNOSIS — I10 ESSENTIAL HYPERTENSION WITH GOAL BLOOD PRESSURE LESS THAN 140/90: Primary | ICD-10-CM

## 2019-06-27 PROCEDURE — 99207 ZZC NO CHARGE NURSE ONLY: CPT | Performed by: FAMILY MEDICINE

## 2019-06-27 NOTE — TELEPHONE ENCOUNTER
Reason for Call:  Same Day Appointment, Requested Provider:  Helga Ibarra MD    PCP: Helga Ibarra    Reason for visit: pap smear    Duration of symptoms: Patient states she is due for it    Have you been treated for this in the past? n/a    Additional comments: Patient is requesting to be worked in anytime between July 18th - 31st for a pap smear.    Can we leave a detailed message on this number? YES    Phone number patient can be reached at: Cell number on file:    Telephone Information:   Mobile [phone]       Best Time: anytime    Call taken on 6/27/2019 at 2:29 PM by Georges GARCIA

## 2019-06-27 NOTE — TELEPHONE ENCOUNTER
Called patient and scheduled her for Wednesday, July 24th at 3:40 pm with Dr. Ibarra.    Liane Escobedo CMA

## 2019-06-27 NOTE — PROGRESS NOTES
Jolynn Haji was evaluated at [facility] on June 27, 2019 at which time her blood pressure was:    BP Readings from Last 3 Encounters:   06/27/19 132/70   11/30/18 118/70   10/23/18 118/70     Pulse Readings from Last 3 Encounters:   11/30/18 89   02/08/18 84   01/29/18 92       Reviewed lifestyle modifications for blood pressure control and reduction: including making healthy food choices, managing weight, getting regular exercise, smoking cessation, reducing alcohol consumption, monitoring blood pressure regularly.     Symptoms: None    BP Goal:< 140/90 mmHg    BP Assessment:  BP at goal    Potential Reasons for BP too high: NA - Not applicable    BP Follow-Up Plan: Recheck BP in 6 months at pharmacy    Recommendation to Provider: no change.    Note completed by: Pallavi Jackson, PharmD  [facility]  PH: [phone]

## 2019-07-24 ENCOUNTER — OFFICE VISIT (OUTPATIENT)
Dept: FAMILY MEDICINE | Facility: CLINIC | Age: 64
End: 2019-07-24
Payer: COMMERCIAL

## 2019-07-24 VITALS
OXYGEN SATURATION: 99 % | WEIGHT: 165 LBS | DIASTOLIC BLOOD PRESSURE: 71 MMHG | TEMPERATURE: 98.5 F | HEART RATE: 80 BPM | HEIGHT: 69 IN | BODY MASS INDEX: 24.44 KG/M2 | SYSTOLIC BLOOD PRESSURE: 116 MMHG

## 2019-07-24 DIAGNOSIS — Z12.31 VISIT FOR SCREENING MAMMOGRAM: ICD-10-CM

## 2019-07-24 DIAGNOSIS — Z87.898 HISTORY OF ABNORMAL MAMMOGRAM: ICD-10-CM

## 2019-07-24 DIAGNOSIS — N60.19 FIBROCYSTIC BREAST DISEASE (FCBD), UNSPECIFIED LATERALITY: ICD-10-CM

## 2019-07-24 DIAGNOSIS — Z01.419 ENCOUNTER FOR GYNECOLOGICAL EXAMINATION WITHOUT ABNORMAL FINDING: Primary | ICD-10-CM

## 2019-07-24 DIAGNOSIS — I10 ESSENTIAL HYPERTENSION WITH GOAL BLOOD PRESSURE LESS THAN 140/90: ICD-10-CM

## 2019-07-24 PROCEDURE — G0145 SCR C/V CYTO,THINLAYER,RESCR: HCPCS | Performed by: FAMILY MEDICINE

## 2019-07-24 PROCEDURE — 99396 PREV VISIT EST AGE 40-64: CPT | Performed by: FAMILY MEDICINE

## 2019-07-24 ASSESSMENT — MIFFLIN-ST. JEOR: SCORE: 1362.82

## 2019-07-24 NOTE — PATIENT INSTRUCTIONS
Please, call or return to clinic or go to the ER immediately if signs or symptoms worsen or fail to improve as anticipated.                Thank you for choosing [facility]  for your Health Care. It was a pleasure seeing you at your visit today. Please contact us with any questions or concerns you may have.                   Helga Ibarra MD                                  To reach your [facility] care team after hours call:   [phone]    Our clinic hours are:     Monday- 7:30 am - 7:00 pm                             Tuesday through Friday- 7:30 am - 5:00 pm                                        Saturday- 8:00 am - 12:00 pm                  Phone:  [phone]    Our pharmacy hours are:     Monday  8:00 am to 7:00 pm      Tuesday through Friday 8:00am to 6:00pm                        Saturday - 9:00 am to 1:00 pm      Sunday : Closed.              Phone:  [phone]      There is also information available at our web site:  [email or web address]    If your provider ordered any lab tests and you do not receive the results within 10 business days, please call the clinic.    If you need a medication refill please contact your pharmacy.  Please allow 2 business days for your refill to be completed.    Our clinic offers telephone visits and e visits.  Please ask one of your team members to explain more.      Use [employer] (secure email communication and access to your chart) to send your primary care provider a message or make an appointment. Ask someone on your Team how to sign up for [employer].

## 2019-07-24 NOTE — PROGRESS NOTES
SUBJECTIVE:                                                    Jolynn Haji is a 63 year old female who presents to clinic today for the following health issues:      Pap smear only today - last was 04/21/2016 - results NIL. Last Physical was 11/30/2018 with Dr Patrick Melgar, but pt didn't want him to do her breast or pelvic exam . Going on Medicare as of 8/1/2019.     Patient Active Problem List   Diagnosis     Essential hypertension with goal blood pressure less than 140/90     Hidradenitis     Localized osteoarthritis of hand     Synovial cyst of popliteal space     DERMATOPHYTOSIS OF NAIL- toenails      Lipidosis     Osteopenia     Postmenopausal     NUMBNESS AND TINGLING OF FOOT - bilateral -mild      Numbness of feet- distal feet R>L      Advanced directives, counseling/discussion     Stress incontinence, female - mild     Cough     Acute bronchitis- recurrent- ? related to mold in school building- pt has since retired and no further bronchitis     Rosacea     History of migraine headaches- assoc. with nausea/vomiting - resolved around menopause - were  premenstrual      Dupuytren's disease of palm - right 4th digit flexor tendon     Sun-damaged skin     Seasonal allergic rhinitis     Basal cell carcinoma of leg, right     Adjustment disorder with mixed anxiety and depressed mood- mild at this time     Family history of celiac disease     TIFFANY (generalized anxiety disorder)     Insomnia, unspecified type     Chronic constipation     Raynaud's phenomenon without gangrene     Other secondary osteoarthritis of left knee     Primary localized osteoarthrosis of left lower leg     Hyperlipidemia LDL goal <130     Basal cell carcinoma     Colon polyps       Current Outpatient Medications   Medication Sig Dispense Refill     clindamycin (CLINDAGEL) 1 % external gel Apply topically daily 60 g 11     escitalopram (LEXAPRO) 10 MG tablet Take 1 tablet (10 mg) by mouth daily 90 tablet 3     lisinopril (PRINIVIL/ZESTRIL) 5 MG  "tablet Take 1 tablet (5 mg) by mouth daily 90 tablet 3     fluticasone (FLONASE) 50 MCG/ACT spray Spray 1-2 sprays into both nostrils daily 1 Bottle 11          Allergies   Allergen Reactions     Augmentin Itching     itching, rash     Fosamax Diarrhea     Diarrhea, stomach cramps, jaw pain        Problem list and histories reviewed & adjusted, as indicated.  Additional history: as documented    Reviewed and updated as needed this visit by clinical staff  Tobacco  Allergies  Meds  Med Hx  Surg Hx  Fam Hx  Soc Hx      Reviewed and updated as needed this visit by Provider       Has been doing yoga and can do great balance poses and is off all her sleep meds.   She's been seeing a doctor down at the [facility] for her lexapro and would like to transfer that here.       ROS:   ROS: 12 point ROS neg other than the symptoms noted above    OBJECTIVE:                                                    /71 (BP Location: Left arm, Patient Position: Chair, Cuff Size: Adult Regular)   Pulse 80   Temp 98.5  F (36.9  C) (Oral)   Ht 1.753 m (5' 9\")   Wt 74.8 kg (165 lb)   LMP 06/28/2001   SpO2 99%   Breastfeeding? No   BMI 24.37 kg/m    Body mass index is 24.37 kg/m .   GENERAL: healthy, alert, well nourished, well hydrated, no distress  HENT: ear canals- normal; TMs- normal; Nose- normal; Mouth- no ulcers, no lesions  NECK: no tenderness, no adenopathy, no asymmetry, no masses, no stiffness; thyroid- normal to palpation  RESP: lungs clear to auscultation - no rales, no rhonchi, no wheezes  Breasts are symmetric.  No dominant, discrete, fixed  or suspicious masses are noted.  Mild symmetric fibrocystic densities are noted in both upper outer quadrants. No skin or nipple changes or axillary nodes. Self exam is taught and encouraged. Mammogram - is due and ordered today.   CV: regular rates and rhythm, normal S1 S2, no S3 or S4 and no murmur, no click or rub -  ABDOMEN: soft, no tenderness, no  hepatosplenomegaly, " no masses, normal bowel sounds  MS: extremities- no gross deformities noted, no edema  SKIN: no suspicious lesions, no rashes  - female: cervix- normal, adnexae- normal; uterus- normal, no masses, no discharge  PSYCH: Alert and oriented times 3; speech- coherent , normal rate and volume; able to articulate logical thoughts, able to abstract reason, no tangential thoughts, no hallucinations or delusions, affect- normal    Diagnostic test results:  Diagnostic Test Results:  Labs reviewed in Epic     ASSESSMENT/PLAN:                                                        ICD-10-CM    1. Encounter for gynecological examination without abnormal finding Z01.419 Pap imaged thin layer screen with HPV - recommended age 30 - 65 years (select HPV order below)     HPV High Risk Types DNA Cervical   2. Essential hypertension with goal blood pressure less than 140/90 I10    3. Visit for screening mammogram Z12.31 MA Screen Bilateral w/Ric   4. Fibrocystic breast disease (FCBD), unspecified laterality N60.19 MA Screen Bilateral w/Ric   5. History of abnormal mammogram Z87.898 MA Screen Bilateral w/Ric     Please, call or return to clinic or go to the ER immediately if signs or symptoms worsen or fail to improve as anticipated.   See Patient Instructions.  Return after new insurance /medicare starts in the next several months for full medicare wellness visit.  Return in about 6 months (around 1/24/2020) for Physical Exam, Lab Work.          Helga Ibarra MD    [facility]

## 2019-07-25 PROCEDURE — 87624 HPV HI-RISK TYP POOLED RSLT: CPT | Performed by: FAMILY MEDICINE

## 2019-07-30 LAB
COPATH REPORT: NORMAL
PAP: NORMAL

## 2019-08-01 LAB
FINAL DIAGNOSIS: NORMAL
HPV HR 12 DNA CVX QL NAA+PROBE: NEGATIVE
HPV16 DNA SPEC QL NAA+PROBE: NEGATIVE
HPV18 DNA SPEC QL NAA+PROBE: NEGATIVE
SPECIMEN DESCRIPTION: NORMAL
SPECIMEN SOURCE CVX/VAG CYTO: NORMAL

## 2019-09-27 ENCOUNTER — HEALTH MAINTENANCE LETTER (OUTPATIENT)
Age: 64
End: 2019-09-27

## 2019-10-08 ENCOUNTER — OFFICE VISIT (OUTPATIENT)
Dept: ORTHOPEDICS | Facility: CLINIC | Age: 64
End: 2019-10-08
Payer: MEDICARE

## 2019-10-08 VITALS
DIASTOLIC BLOOD PRESSURE: 80 MMHG | BODY MASS INDEX: 23.55 KG/M2 | HEIGHT: 69 IN | WEIGHT: 159 LBS | SYSTOLIC BLOOD PRESSURE: 130 MMHG

## 2019-10-08 DIAGNOSIS — M17.12 PRIMARY LOCALIZED OSTEOARTHROSIS OF LEFT LOWER LEG: Primary | ICD-10-CM

## 2019-10-08 PROCEDURE — 20610 DRAIN/INJ JOINT/BURSA W/O US: CPT | Mod: LT | Performed by: FAMILY MEDICINE

## 2019-10-08 PROCEDURE — 99213 OFFICE O/P EST LOW 20 MIN: CPT | Mod: 25 | Performed by: FAMILY MEDICINE

## 2019-10-08 RX ORDER — TRIAMCINOLONE ACETONIDE 40 MG/ML
40 INJECTION, SUSPENSION INTRA-ARTICULAR; INTRAMUSCULAR
Status: DISCONTINUED | OUTPATIENT
Start: 2019-10-08 | End: 2019-12-11

## 2019-10-08 RX ORDER — LIDOCAINE HYDROCHLORIDE 10 MG/ML
4 INJECTION, SOLUTION INFILTRATION; PERINEURAL
Status: DISCONTINUED | OUTPATIENT
Start: 2019-10-08 | End: 2019-12-11

## 2019-10-08 RX ADMIN — TRIAMCINOLONE ACETONIDE 40 MG: 40 INJECTION, SUSPENSION INTRA-ARTICULAR; INTRAMUSCULAR at 14:52

## 2019-10-08 RX ADMIN — LIDOCAINE HYDROCHLORIDE 4 ML: 10 INJECTION, SOLUTION INFILTRATION; PERINEURAL at 14:52

## 2019-10-08 ASSESSMENT — MIFFLIN-ST. JEOR: SCORE: 1330.6

## 2019-10-08 NOTE — LETTER
10/8/2019         RE: Jolynn Haji  [id number] [place] [address]        Dear Colleague,    Thank you for referring your patient, Jolynn Haji, to the [facility]. Please see a copy of my visit note below.    Musculoskeletal Problem        [facility]   Follow-up Visit s Oct 8, 2019    PCP: Helga Ibarra      Subjective:  Jolynn is a 64 year old female who is seen in follow up for evaluation of   Chief Complaint   Patient presents with     Left Knee - Pain     Her last visit was on 2/19/18.  Since that time, symptoms have returned. Jolynn Haji is accompanied today by self.     Patient had a left knee Cortisone injection on 2/19/18 that provided 100% relief, lasting for 12 month(s).   Patient has noticed improved symptoms with injection treatment.    Pain is located left knee, anterior , getting progressively worse.  Patient is using no aids.    Patient denies any new injuries.    Patient's past medical, surgical, social and family histories are reviewed today.    History from previous visit on 2/19/18  Injury: Patient reports insidious onset without acute precipitating event.     right-hand dominant    Location of Pain: left knee medial, nonradiating   Duration of Pain: 11/2017  - worsened after cross country skiing  Rating of Pain at worst: 6/10  Rating of Pain Currently: 1/10  Pain is better with: ice and pain medication: naproxen (NAPROSYN/ALEVE)   Pain is worse with: getting down to floor, walking  and running   Treatment so far consists of: rest, ice, exercises, 3 Aleve BID  Associated symptoms: feels tight knee, clicking no distal numbness or tingling; denies swelling or warmth  Recent imaging completed: X-rays completed see below.  Prior History of related problems: intermittent chronic knee pain    Social History: retired     Past Medical History:   Diagnosis Date     Abnormal glandular Papanicolaou smear of cervix- ASCUS in  2000 12/5/2003    ASC H- 2000     Basal cell carcinoma of leg, right 05/2016    Dr Rogers     Colon polyps 05/2018    tubular adenoam - due 5 yrs - 2 mm     Concussion without loss of consciousness 4/12/2017     Hidradenitis     left  groin     HSIL (high grade squamous intraepithelial lesion) on Pap smear of cervix 7/5/2000    Normal paps from 2001 to 2016/cc     Hypertension goal BP (blood pressure) < 140/90      OA (osteoarthritis) of knee     moderate     Postmenopausal since 7/2008      Shingles 7/23/2010    left      Synovial cyst of popliteal space        Patient Active Problem List    Diagnosis Date Noted     Essential hypertension with goal blood pressure less than 140/90 07/16/2001     Priority: High     Screening for cervical cancer      Priority: Medium     1999 NIL  2000 ASCUS-H.  >> Colpo: Neg  2001 NIL   2003 NIL pap, Neg HPV  [id number], 2016  NIL pap  7/25/19 NIL pap, Neg HPV    Criteria necessary to stop screening per ASCCP guidelines:  Older than 65 years  Three consecutive negative cytology results or two consecutive negative cotest results within the previous 10 years, with the most recent being performed within the last 5 years.   (Women with hx of MADDIE 2 or 3, or adenocarcinoma in situ should continue screening for full 20 years, even if this goes past age 65).         Hyperlipidemia LDL goal <130 11/30/2018     Priority: Medium     Basal cell carcinoma      Priority: Medium     Colon polyps 05/01/2018     Priority: Medium     Primary localized osteoarthrosis of left lower leg 02/19/2018     Priority: Medium     Other secondary osteoarthritis of left knee 01/29/2018     Priority: Medium     Chronic constipation 09/14/2017     Priority: Medium     Raynaud's phenomenon without gangrene 09/14/2017     Priority: Medium     Insomnia, unspecified type 07/25/2017     Priority: Medium     TIFFANY (generalized anxiety disorder) 05/23/2017     Priority: Medium     Adjustment disorder with mixed anxiety and  depressed mood- mild at this time 2017     Priority: Medium     Family history of celiac disease 2017     Priority: Medium     Basal cell carcinoma of leg, right 2016     Priority: Medium     Sun-damaged skin 2016     Priority: Medium     Seasonal allergic rhinitis 2016     Priority: Medium     Dupuytren's disease of palm - right 4th digit flexor tendon 2015     Priority: Medium     History of migraine headaches- assoc. with nausea/vomiting - resolved around menopause - were  premenstrual  2014     Priority: Medium     Rosacea 2013     Priority: Medium     Numbness of feet- distal feet R>L  2011     Priority: Medium     NUMBNESS AND TINGLING OF FOOT - bilateral -mild  2010     Priority: Medium     Osteopenia 2009     Priority: Medium     Lipidosis 10/24/2006     Priority: Medium     Problem list name updated by automated process. Provider to review       Localized osteoarthritis of hand 2005     Priority: Medium     Problem list name updated by automated process. Provider to review       Stress incontinence, female - mild 10/23/2012     Priority: Low     Cough 10/23/2012     Priority: Low     Acute bronchitis- recurrent- ? related to mold in school building- pt has since retired and no further bronchitis 10/23/2012     Priority: Low     Advanced directives, counseling/discussion 10/11/2011     Priority: Low     Patient states has Advance Directive and will bring in a copy to clinic.           Postmenopausal      Priority: Low     DERMATOPHYTOSIS OF NAIL- toenails  2006     Priority: Low     trichophyton rubrum on culture -         Synovial cyst of popliteal space      Priority: Low     Hidradenitis 2003     Priority: Low       Family History   Problem Relation Age of Onset     Thyroid Disease Mother         Graves disease     Hypertension Mother      Diabetes Mother         type 2     Blood Disease Mother          from  "leukemia at age 78     Diabetes Daughter         Juvenile Diabetes     Heart Disease Father 62        MV problem with CHF  at 62/ from MI     Hypertension Father      Diabetes Son         Juvenile Diabetes     Cancer Maternal Uncle         type ??     Cancer Maternal Uncle         liver       Social History     Social History     Marital status:      Spouse name: Zach     Number of children: 2     Years of education: 16     Occupational History     teacher first grade -retired at age 60      [employer] [address] [place] [address] [employer]     Social History Main Topics     Smoking status: Never Smoker     Smokeless tobacco: Never Used     Alcohol use 12.0 oz/week      Comment: 1 per month     Past Surgical History:   Procedure Laterality Date     C NONSPECIFIC PROCEDURE      Colposcopy     COLONOSCOPY  2018    polyps, tubular - 2 mm - due 5 yrs         Review of Systems   Musculoskeletal: Positive for joint pain.   All other systems reviewed and are negative.        Physical Exam Musculoskeletal:      Left knee: Lateral joint line tenderness noted.        /80   Ht 1.753 m (5' 9\")   Wt 72.1 kg (159 lb)   LMP 2001   BMI 23.48 kg/m     Constitutional:well-developed, well-nourished, and in no distress.   Cardiovascular: Intact distal pulses.    Neurological: alert. Gait Normal:   Gait, station, stance, and balance appear normal for age  Skin: Skin is warm and dry.   Psychiatric: Mood and affect normal.   Respiratory: unlabored, speaks in full sentences  Lymph: no LAD, no lymphangitis          Left Knee Exam   Swelling: Mild  Effusion: Yes    Tenderness   The patient is experiencing tenderness in the lateral joint line.    Range of Motion   Extension: 5  Flexion:     120    Tests   McMurrays:  Medial - Negative      Lateral - Negative  Lachman:  Anterior - Negative    Posterior - Negative  Drawer:       Anterior - Negative     Posterior - Negative  Varus:  " Negative  Valgus: Negative  Pivot Shift: Negative  Patellar Apprehension: No    Comments:  Mild bowing noted          X-ray images Previously done and independently reviewed by me in the office today with the patient. X-ray shows:   KNEE ONE-TWO VIEWS LEFT  1/29/2018 4:10 PM   KNEE ONE-TWO VIEWS LEFT  1/29/2018 4:10 PM      HISTORY: increased left knee pain and swelling; Acute pain of left  knee     COMPARISON: None.     FINDINGS: There is narrowing of the medial joint compartment of the  left knee. There appears to be a small knee joint effusion present.  Mild narrowing of the medial compartment of the right knee is also  noted..         IMPRESSION: Degenerative changes in both knees, left greater than  right with significant narrowing of the medial joint compartment of  the left knee.  JO SANZ MD        Assessment:    ICD-10-CM    1. Primary localized osteoarthrosis of left lower leg M17.12 Large Joint Injection/Arthocentesis: L knee joint     [facility]       Mildly progressive arthritis, following good results after injection over a year ago.  Clinically, she is developing some mild bowing.  She is eager to proceed with repeat injection.  She is willing to undergo physical therapy at this time.  Therefore, the following change in management is conducted: Repeat cortisone injection today, followed by the addition of physical therapy.      The benefits, risks, indications for and alternatives to the procedure were discussed with the patient, including bleeding, infection, hyperglycemia, localized lipodystrophy and hypopigmentation. She elected to proceed, and informed consent was signed.    Large Joint Injection/Arthocentesis: L knee joint  Date/Time: 10/8/2019 2:52 PM  Performed by: Jimmie De León MD  Authorized by: Jimmie De León MD     Indications:  Pain and osteoarthritis  Needle Size:  25 G  Guidance: landmark guided    Approach:  Superolateral  Location:   Knee      Medications:  4 mL lidocaine 1 %; 40 mg triamcinolone 40 MG/ML  Outcome:  Tolerated well, no immediate complications  Procedure discussed: discussed risks, benefits, and alternatives    Consent Given by:  Patient  Timeout: timeout called immediately prior to procedure    Prep: patient was prepped and draped in usual sterile fashion            Time spent in one-on-one evalution and discussion with patient regarding nature of problem, course, prior treatments, and therapeutic options, at least 50% of which was spent in counseling and coordination of care: 15 minutes, over and above time spent on injection.      Again, thank you for allowing me to participate in the care of your patient.        Sincerely,        Jimmie De León MD

## 2019-10-08 NOTE — PROGRESS NOTES
Musculoskeletal Problem        [facility]   Follow-up Visit s Oct 8, 2019    PCP: Helga Ibarra      Subjective:  Jolynn is a 64 year old female who is seen in follow up for evaluation of   Chief Complaint   Patient presents with     Left Knee - Pain     Her last visit was on 2/19/18.  Since that time, symptoms have returned. Jolynn Haji is accompanied today by self.     Patient had a left knee Cortisone injection on 2/19/18 that provided 100% relief, lasting for 12 month(s).   Patient has noticed improved symptoms with injection treatment.    Pain is located left knee, anterior , getting progressively worse.  Patient is using no aids.    Patient denies any new injuries.    Patient's past medical, surgical, social and family histories are reviewed today.    History from previous visit on 2/19/18  Injury: Patient reports insidious onset without acute precipitating event.     right-hand dominant    Location of Pain: left knee medial, nonradiating   Duration of Pain: 11/2017  - worsened after cross country skiing  Rating of Pain at worst: 6/10  Rating of Pain Currently: 1/10  Pain is better with: ice and pain medication: naproxen (NAPROSYN/ALEVE)   Pain is worse with: getting down to floor, walking  and running   Treatment so far consists of: rest, ice, exercises, 3 Aleve BID  Associated symptoms: feels tight knee, clicking no distal numbness or tingling; denies swelling or warmth  Recent imaging completed: X-rays completed see below.  Prior History of related problems: intermittent chronic knee pain    Social History: retired     Past Medical History:   Diagnosis Date     Abnormal glandular Papanicolaou smear of cervix- ASCUS in 2000 12/5/2003    ASC H- 2000     Basal cell carcinoma of leg, right 05/2016    Dr Rogers     Colon polyps 05/2018    tubular adenoam - due 5 yrs - 2 mm     Concussion without loss of consciousness 4/12/2017     Hidradenitis     left  groin     HSIL (high grade  squamous intraepithelial lesion) on Pap smear of cervix 7/5/2000    Normal paps from 2001 to 2016/cc     Hypertension goal BP (blood pressure) < 140/90      OA (osteoarthritis) of knee     moderate     Postmenopausal since 7/2008      Shingles 7/23/2010    left      Synovial cyst of popliteal space        Patient Active Problem List    Diagnosis Date Noted     Essential hypertension with goal blood pressure less than 140/90 07/16/2001     Priority: High     Screening for cervical cancer      Priority: Medium     1999 NIL  2000 ASCUS-H.  >> Colpo: Neg  2001 NIL   2003 NIL pap, Neg HPV  [id number], 2016  NIL pap  7/25/19 NIL pap, Neg HPV    Criteria necessary to stop screening per ASCCP guidelines:  Older than 65 years  Three consecutive negative cytology results or two consecutive negative cotest results within the previous 10 years, with the most recent being performed within the last 5 years.   (Women with hx of MADDIE 2 or 3, or adenocarcinoma in situ should continue screening for full 20 years, even if this goes past age 65).         Hyperlipidemia LDL goal <130 11/30/2018     Priority: Medium     Basal cell carcinoma      Priority: Medium     Colon polyps 05/01/2018     Priority: Medium     Primary localized osteoarthrosis of left lower leg 02/19/2018     Priority: Medium     Other secondary osteoarthritis of left knee 01/29/2018     Priority: Medium     Chronic constipation 09/14/2017     Priority: Medium     Raynaud's phenomenon without gangrene 09/14/2017     Priority: Medium     Insomnia, unspecified type 07/25/2017     Priority: Medium     TIFFANY (generalized anxiety disorder) 05/23/2017     Priority: Medium     Adjustment disorder with mixed anxiety and depressed mood- mild at this time 05/11/2017     Priority: Medium     Family history of celiac disease 05/11/2017     Priority: Medium     Basal cell carcinoma of leg, right 05/01/2016     Priority: Medium     Sun-damaged skin 04/21/2016     Priority: Medium      Seasonal allergic rhinitis 2016     Priority: Medium     Dupuytren's disease of palm - right 4th digit flexor tendon 2015     Priority: Medium     History of migraine headaches- assoc. with nausea/vomiting - resolved around menopause - were  premenstrual  2014     Priority: Medium     Rosacea 2013     Priority: Medium     Numbness of feet- distal feet R>L  2011     Priority: Medium     NUMBNESS AND TINGLING OF FOOT - bilateral -mild  2010     Priority: Medium     Osteopenia 2009     Priority: Medium     Lipidosis 10/24/2006     Priority: Medium     Problem list name updated by automated process. Provider to review       Localized osteoarthritis of hand 2005     Priority: Medium     Problem list name updated by automated process. Provider to review       Stress incontinence, female - mild 10/23/2012     Priority: Low     Cough 10/23/2012     Priority: Low     Acute bronchitis- recurrent- ? related to mold in school building- pt has since retired and no further bronchitis 10/23/2012     Priority: Low     Advanced directives, counseling/discussion 10/11/2011     Priority: Low     Patient states has Advance Directive and will bring in a copy to clinic.           Postmenopausal      Priority: Low     DERMATOPHYTOSIS OF NAIL- toenails  2006     Priority: Low     trichophyton rubrum on culture -         Synovial cyst of popliteal space      Priority: Low     Hidradenitis 2003     Priority: Low       Family History   Problem Relation Age of Onset     Thyroid Disease Mother         Graves disease     Hypertension Mother      Diabetes Mother         type 2     Blood Disease Mother          from leukemia at age 78     Diabetes Daughter         Juvenile Diabetes     Heart Disease Father 62        MV problem with CHF  at 62/ from MI     Hypertension Father      Diabetes Son         Juvenile Diabetes     Cancer Maternal Uncle         type ??      "Cancer Maternal Uncle         liver       Social History     Social History     Marital status:      Spouse name: Zach     Number of children: 2     Years of education: 16     Occupational History     teacher first grade -retired at age 60      [employer]     Social History Main Topics     Smoking status: Never Smoker     Smokeless tobacco: Never Used     Alcohol use 12.0 oz/week      Comment: 1 per month     Past Surgical History:   Procedure Laterality Date     C NONSPECIFIC PROCEDURE  2000    Colposcopy     COLONOSCOPY  05/2018    polyps, tubular - 2 mm - due 5 yrs         Review of Systems   Musculoskeletal: Positive for joint pain.   All other systems reviewed and are negative.        Physical Exam Musculoskeletal:      Left knee: Lateral joint line tenderness noted.        /80   Ht 1.753 m (5' 9\")   Wt 72.1 kg (159 lb)   LMP 06/28/2001   BMI 23.48 kg/m    Constitutional:well-developed, well-nourished, and in no distress.   Cardiovascular: Intact distal pulses.    Neurological: alert. Gait Normal:   Gait, station, stance, and balance appear normal for age  Skin: Skin is warm and dry.   Psychiatric: Mood and affect normal.   Respiratory: unlabored, speaks in full sentences  Lymph: no LAD, no lymphangitis          Left Knee Exam   Swelling: Mild  Effusion: Yes    Tenderness   The patient is experiencing tenderness in the lateral joint line.    Range of Motion   Extension: 5  Flexion:     120    Tests   McMurrays:  Medial - Negative      Lateral - Negative  Lachman:  Anterior - Negative    Posterior - Negative  Drawer:       Anterior - Negative     Posterior - Negative  Varus:  Negative  Valgus: Negative  Pivot Shift: Negative  Patellar Apprehension: No    Comments:  Mild bowing noted          X-ray images Previously done and independently reviewed by me in the office today with the patient. X-ray shows:   KNEE ONE-TWO VIEWS LEFT  1/29/2018 4:10 PM "   KNEE ONE-TWO VIEWS LEFT  1/29/2018 4:10 PM      HISTORY: increased left knee pain and swelling; Acute pain of left  knee     COMPARISON: None.     FINDINGS: There is narrowing of the medial joint compartment of the  left knee. There appears to be a small knee joint effusion present.  Mild narrowing of the medial compartment of the right knee is also  noted..         IMPRESSION: Degenerative changes in both knees, left greater than  right with significant narrowing of the medial joint compartment of  the left knee.  JO SANZ MD        Assessment:    ICD-10-CM    1. Primary localized osteoarthrosis of left lower leg M17.12 Large Joint Injection/Arthocentesis: L knee joint     [facility]       Mildly progressive arthritis, following good results after injection over a year ago.  Clinically, she is developing some mild bowing.  She is eager to proceed with repeat injection.  She is willing to undergo physical therapy at this time.  Therefore, the following change in management is conducted: Repeat cortisone injection today, followed by the addition of physical therapy.      The benefits, risks, indications for and alternatives to the procedure were discussed with the patient, including bleeding, infection, hyperglycemia, localized lipodystrophy and hypopigmentation. She elected to proceed, and informed consent was signed.    Large Joint Injection/Arthocentesis: L knee joint  Date/Time: 10/8/2019 2:52 PM  Performed by: Jimmie De León MD  Authorized by: Jimmie De León MD     Indications:  Pain and osteoarthritis  Needle Size:  25 G  Guidance: landmark guided    Approach:  Superolateral  Location:  Knee      Medications:  4 mL lidocaine 1 %; 40 mg triamcinolone 40 MG/ML  Outcome:  Tolerated well, no immediate complications  Procedure discussed: discussed risks, benefits, and alternatives    Consent Given by:  Patient  Timeout: timeout called immediately prior to  procedure    Prep: patient was prepped and draped in usual sterile fashion            Time spent in one-on-one evalution and discussion with patient regarding nature of problem, course, prior treatments, and therapeutic options, at least 50% of which was spent in counseling and coordination of care: 15 minutes, over and above time spent on injection.

## 2019-10-10 ENCOUNTER — TELEPHONE (OUTPATIENT)
Dept: FAMILY MEDICINE | Facility: CLINIC | Age: 64
End: 2019-10-10

## 2019-10-10 DIAGNOSIS — F41.1 GAD (GENERALIZED ANXIETY DISORDER): ICD-10-CM

## 2019-10-10 RX ORDER — ESCITALOPRAM OXALATE 10 MG/1
TABLET ORAL
Qty: 90 TABLET | Refills: 1 | Status: SHIPPED | OUTPATIENT
Start: 2019-10-10 | End: 2019-12-11

## 2019-10-10 ASSESSMENT — PATIENT HEALTH QUESTIONNAIRE - PHQ9
SUM OF ALL RESPONSES TO PHQ QUESTIONS 1-9: 0
5. POOR APPETITE OR OVEREATING: NOT AT ALL

## 2019-10-10 ASSESSMENT — ANXIETY QUESTIONNAIRES
1. FEELING NERVOUS, ANXIOUS, OR ON EDGE: NOT AT ALL
3. WORRYING TOO MUCH ABOUT DIFFERENT THINGS: NOT AT ALL
6. BECOMING EASILY ANNOYED OR IRRITABLE: NOT AT ALL
7. FEELING AFRAID AS IF SOMETHING AWFUL MIGHT HAPPEN: NOT AT ALL
2. NOT BEING ABLE TO STOP OR CONTROL WORRYING: NOT AT ALL
IF YOU CHECKED OFF ANY PROBLEMS ON THIS QUESTIONNAIRE, HOW DIFFICULT HAVE THESE PROBLEMS MADE IT FOR YOU TO DO YOUR WORK, TAKE CARE OF THINGS AT HOME, OR GET ALONG WITH OTHER PEOPLE: NOT DIFFICULT AT ALL
GAD7 TOTAL SCORE: 0
5. BEING SO RESTLESS THAT IT IS HARD TO SIT STILL: NOT AT ALL

## 2019-10-10 NOTE — TELEPHONE ENCOUNTER
"Requested Prescriptions   Pending Prescriptions Disp Refills     escitalopram (LEXAPRO) 10 MG tablet [Pharmacy Med Name: ESCITALOPRAM OXALATE 10MG TABS] 90 tablet 3     Sig: TAKE ONE TABLET BY MOUTH EVERY DAY       Last Refill:    Disp Refills Start End SCOTT   escitalopram (LEXAPRO) 10 MG tablet 90 tablet 3 10/25/2018  No   Sig - Route: Take 1 tablet (10 mg) by mouth daily - Oral     SSRIs Protocol Passed - 10/10/2019  3:01 PM        Passed - Recent (12 mo) or future (30 days) visit within the authorizing provider's specialty     Patient has had an office visit with the authorizing provider or a provider within the authorizing providers department within the previous 12 mos or has a future within next 30 days. See \"Patient Info\" tab in inbasket, or \"Choose Columns\" in Meds & Orders section of the refill encounter.      LOV: 07/24/2019          Passed - Medication is active on med list        Passed - Patient is age 18 or older        Passed - No active pregnancy on record        Passed - No positive pregnancy test in last 12 months        PHQ-9 SCORE 9/14/2017 10/25/2018 10/10/2019   PHQ-9 Total Score - - -   PHQ-9 Total Score 0 0 0     TIFFANY-7 SCORE 8/7/2017 9/14/2017 10/10/2019   Total Score - - -   Total Score 5 0 0     Refilled per RN Protocol.     Twila Hernandez RN  [place] [facility]    "

## 2019-10-11 ASSESSMENT — ANXIETY QUESTIONNAIRES: GAD7 TOTAL SCORE: 0

## 2019-10-21 ENCOUNTER — HOSPITAL ENCOUNTER (OUTPATIENT)
Dept: MAMMOGRAPHY | Facility: CLINIC | Age: 64
Discharge: HOME | End: 2019-10-21
Attending: FAMILY MEDICINE | Admitting: FAMILY MEDICINE
Payer: MEDICARE

## 2019-10-21 DIAGNOSIS — N60.19 FIBROCYSTIC BREAST DISEASE (FCBD), UNSPECIFIED LATERALITY: ICD-10-CM

## 2019-10-21 DIAGNOSIS — Z12.31 VISIT FOR SCREENING MAMMOGRAM: ICD-10-CM

## 2019-10-21 DIAGNOSIS — Z87.898 HISTORY OF ABNORMAL MAMMOGRAM: ICD-10-CM

## 2019-10-21 PROCEDURE — 77063 BREAST TOMOSYNTHESIS BI: CPT

## 2019-11-18 DIAGNOSIS — I10 ESSENTIAL HYPERTENSION WITH GOAL BLOOD PRESSURE LESS THAN 140/90: ICD-10-CM

## 2019-11-18 NOTE — TELEPHONE ENCOUNTER
"Requested Prescriptions   Pending Prescriptions Disp Refills     lisinopril (PRINIVIL/ZESTRIL) 5 MG tablet [Pharmacy Med Name: LISINOPRIL 5MG TABS]          Last Written Prescription Date:  11.30.18  Last Fill Quantity: 90 tablet,  # refills: 3   Last office visit: 7/25/2019 with prescribing provider:  Patrick Melgra MD         Future Office Visit:   Next 5 appointments (look out 90 days)    Dec 11, 2019 10:30 AM CST  PHYSICAL with Patrick Melgar MD  [facility] [address] [place] [address] [phone]            90 tablet 3     Sig: TAKE ONE TABLET BY MOUTH ONCE DAILY       ACE Inhibitors (Including Combos) Protocol Passed - 11/18/2019  8:39 AM        Passed - Blood pressure under 140/90 in past 12 months     BP Readings from Last 3 Encounters:   10/08/19 130/80   07/24/19 116/71   06/27/19 132/70                 Passed - Recent (12 mo) or future (30 days) visit within the authorizing provider's specialty     Patient has had an office visit with the authorizing provider or a provider within the authorizing providers department within the previous 12 mos or has a future within next 30 days. See \"Patient Info\" tab in inbasket, or \"Choose Columns\" in Meds & Orders section of the refill encounter.              Passed - Medication is active on med list        Passed - Patient is age 18 or older        Passed - No active pregnancy on record        Passed - Normal serum creatinine on file in past 12 months     Recent Labs   Lab Test 11/30/18  1048   CR 0.82             Passed - Normal serum potassium on file in past 12 months     Recent Labs   Lab Test 11/30/18  1048   POTASSIUM 5.1             Passed - No positive pregnancy test within past 12 months        "

## 2019-11-19 RX ORDER — LISINOPRIL 5 MG/1
TABLET ORAL
Qty: 90 TABLET | Refills: 0 | Status: SHIPPED | OUTPATIENT
Start: 2019-11-19 | End: 2019-12-11

## 2019-12-11 ENCOUNTER — OFFICE VISIT (OUTPATIENT)
Dept: FAMILY MEDICINE | Facility: CLINIC | Age: 64
End: 2019-12-11
Payer: MEDICARE

## 2019-12-11 VITALS
WEIGHT: 161 LBS | BODY MASS INDEX: 23.85 KG/M2 | HEART RATE: 90 BPM | OXYGEN SATURATION: 97 % | TEMPERATURE: 97.9 F | DIASTOLIC BLOOD PRESSURE: 84 MMHG | HEIGHT: 69 IN | SYSTOLIC BLOOD PRESSURE: 136 MMHG

## 2019-12-11 DIAGNOSIS — L71.9 ROSACEA: ICD-10-CM

## 2019-12-11 DIAGNOSIS — Z86.69 HISTORY OF MIGRAINE HEADACHES: ICD-10-CM

## 2019-12-11 DIAGNOSIS — N39.3 STRESS INCONTINENCE, FEMALE: ICD-10-CM

## 2019-12-11 DIAGNOSIS — I10 ESSENTIAL HYPERTENSION WITH GOAL BLOOD PRESSURE LESS THAN 140/90: ICD-10-CM

## 2019-12-11 DIAGNOSIS — K59.09 CHRONIC CONSTIPATION: ICD-10-CM

## 2019-12-11 DIAGNOSIS — Z23 NEED FOR PNEUMOCOCCAL VACCINATION: ICD-10-CM

## 2019-12-11 DIAGNOSIS — Z51.81 MEDICATION MONITORING ENCOUNTER: ICD-10-CM

## 2019-12-11 DIAGNOSIS — E78.5 HYPERLIPIDEMIA LDL GOAL <130: ICD-10-CM

## 2019-12-11 DIAGNOSIS — M15.0 PRIMARY OSTEOARTHRITIS INVOLVING MULTIPLE JOINTS: ICD-10-CM

## 2019-12-11 DIAGNOSIS — F43.23 ADJUSTMENT DISORDER WITH MIXED ANXIETY AND DEPRESSED MOOD: ICD-10-CM

## 2019-12-11 DIAGNOSIS — Z00.00 ENCOUNTER FOR ROUTINE ADULT HEALTH EXAMINATION WITHOUT ABNORMAL FINDINGS: Primary | ICD-10-CM

## 2019-12-11 DIAGNOSIS — Z00.00 ENCOUNTER FOR MEDICARE ANNUAL WELLNESS EXAM: ICD-10-CM

## 2019-12-11 DIAGNOSIS — F41.1 GAD (GENERALIZED ANXIETY DISORDER): ICD-10-CM

## 2019-12-11 DIAGNOSIS — Z85.828 HISTORY OF SKIN CANCER: ICD-10-CM

## 2019-12-11 DIAGNOSIS — K63.5 POLYP OF COLON, UNSPECIFIED PART OF COLON, UNSPECIFIED TYPE: ICD-10-CM

## 2019-12-11 DIAGNOSIS — M85.89 OSTEOPENIA OF MULTIPLE SITES: ICD-10-CM

## 2019-12-11 DIAGNOSIS — I73.00 RAYNAUD'S PHENOMENON WITHOUT GANGRENE: ICD-10-CM

## 2019-12-11 DIAGNOSIS — Z12.11 SCREEN FOR COLON CANCER: ICD-10-CM

## 2019-12-11 LAB
ALBUMIN UR-MCNC: NEGATIVE MG/DL
APPEARANCE UR: CLEAR
BILIRUB UR QL STRIP: NEGATIVE
COLOR UR AUTO: YELLOW
ERYTHROCYTE [DISTWIDTH] IN BLOOD BY AUTOMATED COUNT: 13.1 % (ref 10–15)
GLUCOSE UR STRIP-MCNC: NEGATIVE MG/DL
HCT VFR BLD AUTO: 43.2 % (ref 35–47)
HGB BLD-MCNC: 13.8 G/DL (ref 11.7–15.7)
HGB UR QL STRIP: NEGATIVE
KETONES UR STRIP-MCNC: NEGATIVE MG/DL
LEUKOCYTE ESTERASE UR QL STRIP: NEGATIVE
MCH RBC QN AUTO: 29.4 PG (ref 26.5–33)
MCHC RBC AUTO-ENTMCNC: 31.9 G/DL (ref 31.5–36.5)
MCV RBC AUTO: 92 FL (ref 78–100)
NITRATE UR QL: NEGATIVE
PH UR STRIP: 6 PH (ref 5–7)
PLATELET # BLD AUTO: 225 10E9/L (ref 150–450)
RBC # BLD AUTO: 4.7 10E12/L (ref 3.8–5.2)
SOURCE: NORMAL
SP GR UR STRIP: 1.02 (ref 1–1.03)
UROBILINOGEN UR STRIP-ACNC: 0.2 EU/DL (ref 0.2–1)
WBC # BLD AUTO: 6 10E9/L (ref 4–11)

## 2019-12-11 PROCEDURE — G0402 INITIAL PREVENTIVE EXAM: HCPCS | Performed by: FAMILY MEDICINE

## 2019-12-11 PROCEDURE — G0009 ADMIN PNEUMOCOCCAL VACCINE: HCPCS | Performed by: FAMILY MEDICINE

## 2019-12-11 PROCEDURE — 36415 COLL VENOUS BLD VENIPUNCTURE: CPT | Performed by: FAMILY MEDICINE

## 2019-12-11 PROCEDURE — 82043 UR ALBUMIN QUANTITATIVE: CPT | Performed by: FAMILY MEDICINE

## 2019-12-11 PROCEDURE — 82306 VITAMIN D 25 HYDROXY: CPT | Performed by: FAMILY MEDICINE

## 2019-12-11 PROCEDURE — 80061 LIPID PANEL: CPT | Performed by: FAMILY MEDICINE

## 2019-12-11 PROCEDURE — 85027 COMPLETE CBC AUTOMATED: CPT | Performed by: FAMILY MEDICINE

## 2019-12-11 PROCEDURE — 90670 PCV13 VACCINE IM: CPT | Performed by: FAMILY MEDICINE

## 2019-12-11 PROCEDURE — 80053 COMPREHEN METABOLIC PANEL: CPT | Performed by: FAMILY MEDICINE

## 2019-12-11 PROCEDURE — 81003 URINALYSIS AUTO W/O SCOPE: CPT | Performed by: FAMILY MEDICINE

## 2019-12-11 PROCEDURE — 84443 ASSAY THYROID STIM HORMONE: CPT | Performed by: FAMILY MEDICINE

## 2019-12-11 PROCEDURE — 82550 ASSAY OF CK (CPK): CPT | Performed by: FAMILY MEDICINE

## 2019-12-11 RX ORDER — CLINDAMYCIN PHOSPHATE 10 MG/G
GEL TOPICAL DAILY
Qty: 60 G | Refills: 11 | Status: SHIPPED | OUTPATIENT
Start: 2019-12-11 | End: 2021-01-13

## 2019-12-11 RX ORDER — ESCITALOPRAM OXALATE 10 MG/1
10 TABLET ORAL DAILY
Qty: 90 TABLET | Refills: 3 | Status: SHIPPED | OUTPATIENT
Start: 2019-12-11 | End: 2021-01-13

## 2019-12-11 RX ORDER — LISINOPRIL 5 MG/1
5 TABLET ORAL DAILY
Qty: 90 TABLET | Refills: 3 | Status: SHIPPED | OUTPATIENT
Start: 2019-12-11 | End: 2021-01-13

## 2019-12-11 ASSESSMENT — ACTIVITIES OF DAILY LIVING (ADL): CURRENT_FUNCTION: NO ASSISTANCE NEEDED

## 2019-12-11 ASSESSMENT — MIFFLIN-ST. JEOR: SCORE: 1339.67

## 2019-12-11 NOTE — PROGRESS NOTES
"SUBJECTIVE:   Jolynn Haji is a 64 year old female who presents for Preventive Visit.    Are you in the first 12 months of your Medicare coverage?  Yes,  Visual Acuity:  Right Eye: 20/50   Left Eye: 20/25  Both Eyes: 20/25    Healthy Habits:    In general, how would you rate your overall health?  Excellent    Frequency of exercise:  6-7 days/week    Duration of exercise:  15-30 minutes    Do you usually eat at least 4 servings of fruit and vegetables a day, include whole grains    & fiber and avoid regularly eating high fat or \"junk\" foods?  No    Taking medications regularly:  Yes    Barriers to taking medications:  None    Medication side effects:  None    Ability to successfully perform activities of daily living:  No assistance needed    Home Safety:  No safety concerns identified    Hearing Impairment:  No hearing concerns    In the past 6 months, have you been bothered by leaking of urine? Yes (when coughing)    In general, how would you rate your overall mental or emotional health?  Excellent      PHQ-2 Total Score:    Additional concerns today:  No    Hypertension: Patient presents today as normotensive. Patient is currently prescribed 5 mg Lisinopril daily for hypertension management.    BP Readings from Last 5 Encounters:   12/11/19 136/84   10/08/19 130/80   07/24/19 116/71   06/27/19 132/70   11/30/18 118/70     Creatinine   Date Value Ref Range Status   11/30/2018 0.82 0.52 - 1.04 mg/dL Final     Hyperlipidemia: Patient's hyperlipidemia is well controlled. Patient is not currently prescribed any medication for hyperlipidemia management.    Recent Labs   Lab Test 11/30/18  1048 09/14/17  1008  11/04/14  0853 10/28/13  0932   CHOL 206* 207*   < > 191 198   HDL 69 85   < > 70 68   * 103*   < > 106 115   TRIG 77 94   < > 77 73   CHOLHDLRATIO  --   --   --  2.7 2.9    < > = values in this interval not displayed.     Depression/Anxiety: Patient's depression/anxiety is well controlled. Patient is " currently prescribed 10 mg Lexapro daily for depression/anxiety management. Patient is interested in discontinuing medication.     Raynaud's Syndrome: Stable. No issues.     Osteoarthritis: Stable. No issues.     Constipation: Stable. No issues.     Hx of Skin Cancer: Stable. No issues. Patient reports she did not have a yearly skin exam but reports she will schedule one soon.     Migraines: Stable. Patient reports her migraines are much improved and she just occasionally has mild headaches.     Do you feel safe in your environment? Yes    Have you ever done Advance Care Planning? (For example, a Health Directive, POLST, or a discussion with a medical provider or your loved ones about your wishes): No, advance care planning information given to patient to review.  Advanced care planning was discussed at today's visit.    Fall risk  Fallen 2 or more times in the past year?: No  Any fall with injury in the past year?: No    Cognitive Screening   1) Repeat 3 items (Leader, Season, Table)    2) Clock draw: NORMAL  3) 3 item recall: Recalls 3 objects  Results: 3 items recalled: COGNITIVE IMPAIRMENT LESS LIKELY    Mini-CogTM Copyright S Jorje. Licensed by the author for use in [facility]; reprinted with permission (aurelio@[facility]). All rights reserved.      Do you have sleep apnea, excessive snoring or daytime drowsiness?: no    Reviewed and updated as needed this visit by clinical staff  Tobacco  Allergies  Meds  Med Hx  Surg Hx  Fam Hx  Soc Hx      Reviewed and updated as needed this visit by Provider        Social History     Tobacco Use     Smoking status: Never Smoker     Smokeless tobacco: Never Used   Substance Use Topics     Alcohol use: Yes     Alcohol/week: 0.0 - 1.0 standard drinks     Comment: 2 per month     If you drink alcohol do you typically have >3 drinks per day or >7 drinks per week? No    Alcohol Use 12/11/2019   Prescreen: >3 drinks/day or >7 drinks/week? No     Current providers  sharing in care for this patient include:   Patient Care Team:  Helga Ibarra MD as PCP - Kim Mittal APRN CNS as Nurse Practitioner (Clinical Nurse Specialist)  Helga Ibarra MD as Assigned PCP    The following health maintenance items are reviewed in Epic and correct as of today:  Health Maintenance   Topic Date Due     FALL RISK ASSESSMENT  08/18/2019     BMP  11/30/2019     MICROALBUMIN  11/30/2019     PNEUMOCOCCAL IMMUNIZATION 65+ LOW/MEDIUM RISK (1 of 2 - PCV13) 08/18/2019     MEDICARE ANNUAL WELLNESS VISIT  07/24/2020     DEXA  10/05/2020     MAMMO SCREENING  10/21/2020     DTAP/TDAP/TD IMMUNIZATION (4 - Td) 10/11/2021     COLONOSCOPY  05/01/2023     LIPID  11/30/2023     ADVANCE CARE PLANNING  12/11/2024     HEPATITIS C SCREENING  Completed     PHQ-2  Completed     INFLUENZA VACCINE  Completed     ZOSTER IMMUNIZATION  Completed     HIV SCREENING  Addressed     IPV IMMUNIZATION  Aged Out     MENINGITIS IMMUNIZATION  Aged Out     Health Maintenance     Colonoscopy:  Last 5/1/2018, Due 5/2023   FIT:  Ordered              Mammogram:  Last 10/21/2019, Due 10/2020              PAP:  Last 7/24/2019, Due 7/2024   DEXA:  Last 10/5/2017, Due 10/2020    Health Maintenance Due   Topic Date Due     FALL RISK ASSESSMENT  08/18/2019     BMP  11/30/2019     MICROALBUMIN  11/30/2019     PNEUMOCOCCAL IMMUNIZATION 65+ LOW/MEDIUM RISK (1 of 2 - PCV13) 08/18/2019       Current Problem List    Patient Active Problem List   Diagnosis     Essential hypertension with goal blood pressure less than 140/90     Hidradenitis     Localized osteoarthritis of hand     Synovial cyst of popliteal space     DERMATOPHYTOSIS OF NAIL- toenails      Lipidosis     Osteopenia     Postmenopausal     NUMBNESS AND TINGLING OF FOOT - bilateral -mild      Numbness of feet- distal feet R>L      Advanced directives, counseling/discussion     Stress incontinence, female - mild     Cough     Acute bronchitis- recurrent-  ? related to mold in school building- pt has since retired and no further bronchitis     Rosacea     History of migraine headaches- assoc. with nausea/vomiting - resolved around menopause - were  premenstrual      Dupuytren's disease of palm - right 4th digit flexor tendon     Sun-damaged skin     Seasonal allergic rhinitis     Basal cell carcinoma of leg, right     Adjustment disorder with mixed anxiety and depressed mood- mild at this time     Family history of celiac disease     TIFFANY (generalized anxiety disorder)     Insomnia, unspecified type     Chronic constipation     Raynaud's phenomenon without gangrene     Other secondary osteoarthritis of left knee     Primary localized osteoarthrosis of left lower leg     Hyperlipidemia LDL goal <130     Basal cell carcinoma     Colon polyps     Screening for cervical cancer       Past Medical History    Past Medical History:   Diagnosis Date     Abnormal glandular Papanicolaou smear of cervix- ASCUS in 2000 12/5/2003    ASC H- 2000     Basal cell carcinoma of leg, right 05/2016    Dr Rogers     Colon polyps 05/2018    tubular adenoam - due 5 yrs - 2 mm     Concussion without loss of consciousness 4/12/2017     Hidradenitis     left  groin     HSIL (high grade squamous intraepithelial lesion) on Pap smear of cervix 7/5/2000    Normal paps from 2001 to 2016/cc     Hypertension goal BP (blood pressure) < 140/90      OA (osteoarthritis) of knee     moderate     Postmenopausal since 7/2008      Shingles 7/23/2010    left      Synovial cyst of popliteal space        Past Surgical History    Past Surgical History:   Procedure Laterality Date     C NONSPECIFIC PROCEDURE  2000    Colposcopy     COLONOSCOPY  05/2018    polyps, tubular - 2 mm - due 5 yrs       Current Medications    Current Outpatient Medications   Medication Sig Dispense Refill     clindamycin (CLINDAGEL) 1 % external gel Apply topically daily 60 g 11     escitalopram (LEXAPRO) 10 MG tablet Take 1 tablet (10 mg)  by mouth daily 90 tablet 3     lisinopril (PRINIVIL/ZESTRIL) 5 MG tablet Take 1 tablet (5 mg) by mouth daily 90 tablet 3       Allergies    Allergies   Allergen Reactions     Fosamax Diarrhea     Diarrhea, stomach cramps, jaw pain      Augmentin Itching and Rash       Immunizations    Immunization History   Administered Date(s) Administered     Flu 65+ Years 10/10/2019     Influenza (H1N1) 2009     Influenza (High Dose) 3 valent vaccine 10/15/2019     Influenza (IIV3) PF 11/15/2001, 2004, 10/30/2009, 10/03/2012, 10/02/2013, 10/08/2014     Influenza Intranasal Vaccine 4 valent 10/25/2016     Influenza Vaccine IM > 6 months Valent IIV4 10/11/2017, 2018     Pneumo Conj 13-V (2010&after) 2019     Pneumococcal 23 valent 10/23/2012     TD (ADULT, 7+) 1995, 2005     TDAP Vaccine (Boostrix) 10/11/2011     Zoster vaccine recombinant adjuvanted (SHINGRIX) 2018, 2019     Zoster vaccine, live 10/11/2011       Family History    Family History   Problem Relation Age of Onset     Thyroid Disease Mother         Graves disease     Hypertension Mother      Diabetes Mother         type 2     Blood Disease Mother          from leukemia at age 78     Diabetes Daughter         Juvenile Diabetes     Heart Disease Father 62        MV problem with CHF  at 62/ from MI     Hypertension Father      Diabetes Son         Juvenile Diabetes     Cancer Maternal Uncle         type ??     Cancer Maternal Uncle         liver       Social History    Social History     Socioeconomic History     Marital status:      Spouse name: Zach     Number of children: 2     Years of education: 16     Highest education level: Not on file   Occupational History     Occupation: teacher first grade -retired at age 60     Comment: [employer]     Employer: [employer]   Social Needs     Financial resource strain: Not on file     Food insecurity:     Worry: Not on file      Inability: Not on file     Transportation needs:     Medical: Not on file     Non-medical: Not on file   Tobacco Use     Smoking status: Never Smoker     Smokeless tobacco: Never Used   Substance and Sexual Activity     Alcohol use: Yes     Alcohol/week: 0.0 - 1.0 standard drinks     Comment: 2 per month     Drug use: No     Comment: no herbal meds either     Sexual activity: Yes     Partners: Male     Birth control/protection: Surgical     Comment: -was on DepoProvera since 2000-2/2008 -  had vasectomy   Lifestyle     Physical activity:     Days per week: Not on file     Minutes per session: Not on file     Stress: Not on file   Relationships     Social connections:     Talks on phone: Not on file     Gets together: Not on file     Attends Sikhism service: Not on file     Active member of club or organization: Not on file     Attends meetings of clubs or organizations: Not on file     Relationship status: Not on file     Intimate partner violence:     Fear of current or ex partner: Not on file     Emotionally abused: Not on file     Physically abused: Not on file     Forced sexual activity: Not on file   Other Topics Concern      Service No     Blood Transfusions No     Caffeine Concern No     Occupational Exposure No     Hobby Hazards No     Sleep Concern No     Stress Concern No     Weight Concern No     Special Diet No     Back Care No     Exercise Yes     Comment: regular      Bike Helmet No     Seat Belt Yes     Comment: always     Self-Exams Yes     Comment: SBE  encouraged monthly     Parent/sibling w/ CABG, MI or angioplasty before 65F 55M? No   Social History Narrative    calcium - 3 +dairy servings/day and/or calcium supplement daily     flex sig/colonoscopy -referred 11/05 - was postponed by [facility] - will pt another referral  3/14/06, didn't go - referred again 6/27/07 = normal.     sun precautions - discussed    mammogram - yearly     Td booster -1995, 2/14/05      "pneumovax -at age 60-65    DEXA -normal 6/2005- recheck this year 2008 - referral given 2/2008    stool hemoccults - every year after age 40    ASA- start 81mg asa qday    mulvitamin - encouraged    Last menses about 7/08.         no hot flashes- 2005 - was on Depoprovera 2000- 2/2008        Review of Systems  Constitutional, HEENT, cardiovascular, pulmonary, GI, , musculoskeletal, neuro, skin, endocrine and psych systems are negative, except as otherwise noted.    OBJECTIVE:   /84   Pulse 90   Temp 97.9  F (36.6  C) (Oral)   Ht 1.753 m (5' 9\")   Wt 73 kg (161 lb)   LMP 06/28/2001   SpO2 97%   BMI 23.78 kg/m   Estimated body mass index is 23.78 kg/m  as calculated from the following:    Height as of this encounter: 1.753 m (5' 9\").    Weight as of this encounter: 73 kg (161 lb).  Physical Exam  GENERAL: healthy, alert and no distress  EYES: Eyes grossly normal to inspection  HENT:ear canals and TM's normal upon viewing with otoscope, nose and mouth without ulcers or lesions upon viewing with otoscope  NECK: no adenopathy, no asymmetry, masses, or scars and thyroid normal to palpation  RESP: lungs clear to auscultation - no rales, rhonchi or wheezes  CV: regular rate and rhythm, normal S1 S2, no S3 or S4, no murmur, click or rub, no peripheral edema and peripheral pulses strong  ABDOMEN: soft, nontender, no hepatosplenomegaly, no masses and bowel sounds normal  MS: no gross musculoskeletal defects noted, no edema  SKIN: no suspicious lesions or rashes, 1 cm scaly lesion on right lateral elbow  NEURO: Normal strength and tone, mentation intact and speech normal  PSYCH: mentation appears normal, affect normal/bright  BACK: no CVA tenderness, no paralumbar tenderness    Diagnostic Test Results:  Results for orders placed or performed in visit on 12/11/19 (from the past 24 hour(s))   CBC with platelets   Result Value Ref Range    WBC 6.0 4.0 - 11.0 10e9/L    RBC Count 4.70 3.8 - 5.2 10e12/L    Hemoglobin " 13.8 11.7 - 15.7 g/dL    Hematocrit 43.2 35.0 - 47.0 %    MCV 92 78 - 100 fl    MCH 29.4 26.5 - 33.0 pg    MCHC 31.9 31.5 - 36.5 g/dL    RDW 13.1 10.0 - 15.0 %    Platelet Count 225 150 - 450 10e9/L   UA reflex to Microscopic and Culture   Result Value Ref Range    Color Urine Yellow     Appearance Urine Clear     Glucose Urine Negative NEG^Negative mg/dL    Bilirubin Urine Negative NEG^Negative    Ketones Urine Negative NEG^Negative mg/dL    Specific Gravity Urine 1.020 1.003 - 1.035    Blood Urine Negative NEG^Negative    pH Urine 6.0 5.0 - 7.0 pH    Protein Albumin Urine Negative NEG^Negative mg/dL    Urobilinogen Urine 0.2 0.2 - 1.0 EU/dL    Nitrite Urine Negative NEG^Negative    Leukocyte Esterase Urine Negative NEG^Negative    Source Midstream Urine         Labs Pending    ASSESSMENT / PLAN:       ICD-10-CM    1. Encounter for routine adult health examination without abnormal findings Z00.00 Comprehensive metabolic panel     Lipid panel reflex to direct LDL Fasting     CBC with platelets     CK total     TSH with free T4 reflex     UA reflex to Microscopic and Culture     Albumin Random Urine Quantitative with Creat Ratio     Fecal colorectal cancer screen FIT     clindamycin (CLINDAGEL) 1 % external gel     escitalopram (LEXAPRO) 10 MG tablet     lisinopril (PRINIVIL/ZESTRIL) 5 MG tablet     Vitamin D Deficiency   2. Encounter for Medicare annual wellness exam Z00.00 Comprehensive metabolic panel     Lipid panel reflex to direct LDL Fasting     CBC with platelets     CK total     TSH with free T4 reflex     UA reflex to Microscopic and Culture     Albumin Random Urine Quantitative with Creat Ratio     Fecal colorectal cancer screen FIT     clindamycin (CLINDAGEL) 1 % external gel     escitalopram (LEXAPRO) 10 MG tablet     lisinopril (PRINIVIL/ZESTRIL) 5 MG tablet     Vitamin D Deficiency   3. Essential hypertension with goal blood pressure less than 140/90 I10 Comprehensive metabolic panel     UA reflex to  Microscopic and Culture     Albumin Random Urine Quantitative with Creat Ratio   4. Hyperlipidemia LDL goal <130 E78.5 Comprehensive metabolic panel     Lipid panel reflex to direct LDL Fasting     CK total   5. Primary osteoarthritis involving multiple joints M15.0    6. Raynaud's phenomenon without gangrene I73.00    7. Chronic constipation K59.09 TSH with free T4 reflex   8. TIFFANY (generalized anxiety disorder) F41.1 Comprehensive metabolic panel     TSH with free T4 reflex     escitalopram (LEXAPRO) 10 MG tablet   9. Adjustment disorder with mixed anxiety and depressed mood- mild at this time F43.23 Comprehensive metabolic panel     TSH with free T4 reflex     escitalopram (LEXAPRO) 10 MG tablet   10. Osteopenia of multiple sites M85.89 Comprehensive metabolic panel     Vitamin D Deficiency   11. Stress incontinence, female - mild N39.3 UA reflex to Microscopic and Culture     Albumin Random Urine Quantitative with Creat Ratio   12. History of skin cancer Z85.828    13. Rosacea L71.9 clindamycin (CLINDAGEL) 1 % external gel   14. History of migraine headaches- assoc. with nausea/vomiting - resolved around menopause - were  premenstrual  Z86.69    15. Polyp of colon, unspecified part of colon, unspecified type K63.5 Fecal colorectal cancer screen FIT   16. Screen for colon cancer Z12.11 Fecal colorectal cancer screen FIT   17. Medication monitoring encounter Z51.81 Comprehensive metabolic panel     Lipid panel reflex to direct LDL Fasting     CBC with platelets     CK total     TSH with free T4 reflex     UA reflex to Microscopic and Culture     Albumin Random Urine Quantitative with Creat Ratio     clindamycin (CLINDAGEL) 1 % external gel     escitalopram (LEXAPRO) 10 MG tablet     lisinopril (PRINIVIL/ZESTRIL) 5 MG tablet   18. Need for pneumococcal vaccination Z23 PNEUMOCOCCAL CONJ VACCINE 13 VALENT IM     Discussed treatment/modality options, including risk and benefits, she desires advised 1 multivitamin per  "day, advised calcium 2902-1869 mg/d and Vitamin D 800-1200 IU/d, advised dentist every 6 months, advised diet and exercise and advised opthalmologist every 1-2 years. All diagnosis above reviewed and noted above, otherwise stable.  See [facility] orders for further details.      1) Patient presents today as normotensive. Patient is currently prescribed 5 mg Lisinopril daily for hypertension management. Advised continued use.     2) Patient's hyperlipidemia is well controlled. Patient is not currently prescribed any medication for hyperlipidemia management.    3) Patient's depression/anxiety is well controlled. Patient is currently prescribed 10 mg Lexapro daily for depression/anxiety management. Patient is interested in discontinuing medication. Advised continued use of medication and will talk about discontinuing medication in spring.     4) Patient advised to call and schedule yearly skin examination.     5) Prescriptions refilled today.     6) Received Prevnar 13 vaccine today.     7) Follow up in 1 year for complete physical exam.     Health Maintenance Due   Topic Date Due     FALL RISK ASSESSMENT  08/18/2019     BMP  11/30/2019     MICROALBUMIN  11/30/2019     PNEUMOCOCCAL IMMUNIZATION 65+ LOW/MEDIUM RISK (1 of 2 - PCV13) 08/18/2019       COUNSELING:  Reviewed preventive health counseling, as reflected in patient instructions    Estimated body mass index is 23.78 kg/m  as calculated from the following:    Height as of this encounter: 1.753 m (5' 9\").    Weight as of this encounter: 73 kg (161 lb).     reports that she has never smoked. She has never used smokeless tobacco.    Appropriate preventive services were discussed with this patient, including applicable screening as appropriate for cardiovascular disease, diabetes, osteopenia/osteoporosis, and glaucoma.  As appropriate for age/gender, discussed screening for colorectal cancer, prostate cancer, breast cancer, and cervical cancer. Checklist reviewing " preventive services available has been given to the patient.    Reviewed patients plan of care and provided an AVS. The Basic Care Plan (routine screening as documented in Health Maintenance) for Jolynn meets the Care Plan requirement. This Care Plan has been established and reviewed with the Patient.    Counseling Resources:  ATP IV Guidelines  Pooled Cohorts Equation Calculator  Breast Cancer Risk Calculator  FRAX Risk Assessment  ICSI Preventive Guidelines  Dietary Guidelines for Americans, 2010  USDA's MyPlate  ASA Prophylaxis  Lung CA Screening    This document serves as a record of the services and decisions personally performed and made by Patrick Melgar MD. It was created on his behalf by Bay Perez, a trained medical scribe. The creation of this document is based on the provider's statements to the medical scribe.  Bay Perez December 11, 2019 10:54 AM     The information in this document, created by the medical scribe for me, accurately reflects the services I personally performed and the decisions made by me. I have reviewed and approved this document for accuracy prior to leaving the patient care area.  December 11, 2019          Patrick Melgar MD, FAAFP    [address]  [address] [phone] Fax

## 2019-12-12 LAB
ALBUMIN SERPL-MCNC: 4.2 G/DL (ref 3.4–5)
ALP SERPL-CCNC: 69 U/L (ref 40–150)
ALT SERPL W P-5'-P-CCNC: 20 U/L (ref 0–50)
ANION GAP SERPL CALCULATED.3IONS-SCNC: 8 MMOL/L (ref 3–14)
AST SERPL W P-5'-P-CCNC: 14 U/L (ref 0–45)
BILIRUB SERPL-MCNC: 0.6 MG/DL (ref 0.2–1.3)
BUN SERPL-MCNC: 18 MG/DL (ref 7–30)
CALCIUM SERPL-MCNC: 9.2 MG/DL (ref 8.5–10.1)
CHLORIDE SERPL-SCNC: 105 MMOL/L (ref 94–109)
CHOLEST SERPL-MCNC: 205 MG/DL
CK SERPL-CCNC: 108 U/L (ref 30–225)
CO2 SERPL-SCNC: 24 MMOL/L (ref 20–32)
CREAT SERPL-MCNC: 0.7 MG/DL (ref 0.52–1.04)
DEPRECATED CALCIDIOL+CALCIFEROL SERPL-MC: 29 UG/L (ref 20–75)
GFR SERPL CREATININE-BSD FRML MDRD: 90 ML/MIN/{1.73_M2}
GLUCOSE SERPL-MCNC: 85 MG/DL (ref 70–99)
HDLC SERPL-MCNC: 76 MG/DL
LDLC SERPL CALC-MCNC: 115 MG/DL
NONHDLC SERPL-MCNC: 129 MG/DL
POTASSIUM SERPL-SCNC: 4.8 MMOL/L (ref 3.4–5.3)
PROT SERPL-MCNC: 6.9 G/DL (ref 6.8–8.8)
SODIUM SERPL-SCNC: 137 MMOL/L (ref 133–144)
TRIGL SERPL-MCNC: 71 MG/DL
TSH SERPL DL<=0.005 MIU/L-ACNC: 0.96 MU/L (ref 0.4–4)

## 2019-12-13 LAB
CREAT UR-MCNC: 77 MG/DL
MICROALBUMIN UR-MCNC: 5 MG/L
MICROALBUMIN/CREAT UR: 6.73 MG/G CR (ref 0–25)

## 2020-09-11 ENCOUNTER — NURSE TRIAGE (OUTPATIENT)
Dept: FAMILY MEDICINE | Facility: CLINIC | Age: 65
End: 2020-09-11

## 2020-09-11 NOTE — TELEPHONE ENCOUNTER
"Patient calling stating she has been having low BP readings for the past 3-4 weeks. [place] and [place]headed so patient checked her BP and it was in the 80s/40's.  Patient got a new machine about a month ago and has been continuing to monitor her BP readings.    Patient is currently asymptomatic.  Advised patient to drink some water and lay down with feet elevated for 1-2 hours and if no improvement in BP, then needs to go to the ED for hypotension.  Patient stated understanding and was agreeable with plan.          Reason for Disposition    Systolic BP < 80 and NOT dizzy, lightheaded or weak (feels normal)    Additional Information    Negative: Systolic BP < 90 and feeling dizzy, lightheaded, or weak    Negative: Started suddenly after an allergic medicine, an allergic food, or bee sting    Negative: Shock suspected (e.g., cold/pale/clammy skin, too weak to stand, low BP, rapid pulse)    Negative: Difficult to awaken or acting confused  (e.g., disoriented, slurred speech)    Negative: Fainted    Negative: Chest pain    Negative: Bleeding (e.g., vomiting blood, rectal bleeding or tarry stools, severe vaginal bleeding)    Negative: Extra heart beats or heart is beating fast  (i.e., \"palpitations\")    Negative: Sounds like a life-threatening emergency to the triager    Negative: Abdominal pain    Negative: Major surgery in the past month    Negative: Fever > 100.5 F (38.1 C)    Negative: Drinking very little and has signs of dehydration (e.g., no urine > 12 hours, very dry mouth, very lightheaded)    Negative: Fall in systolic BP > 20 mm Hg from normal and feeling dizzy, lightheaded, or weak    Negative: Patient sounds very sick or weak to the triager    Negative: Systolic BP  while taking blood pressure medications and NOT dizzy, lightheaded or weak    Answer Assessment - Initial Assessment Questions  1. BLOOD PRESSURE: \"What is the blood " "pressure?\" \"Did you take at least two measurements 5 minutes apart?\"      83/47 at 3 pm  2. ONSET: \"When did you take your blood pressure?\"      3 pm, patient has been monitoring BP for the past month  3. HOW: \"How did you obtain the blood pressure?\" (e.g., visiting nurse, automatic home BP monitor)      Home BP monitor, patient usually takes BP on left  4. HISTORY: \"Do you have a history of low blood pressure?\" \"What is your blood pressure normally?\"      no  5. MEDICATIONS: \"Are you taking any medications for blood pressure?\" If yes: \"Have they been changed recently?\"      5 mg lisinopril  6. PULSE RATE: \"Do you know what your pulse rate is?\"       80-90's.  7. OTHER SYMPTOMS: \"Have you been sick recently?\" \"Have you had a recent injury?\"      No, but patient has lost maybe about 12 lbs  8. PREGNANCY: \"Is there any chance you are pregnant?\" \"When was your last menstrual period?\"      no    Protocols used: LOW BLOOD PRESSURE-A-OH    MALIHA DempseyN, RN  [employer] T[employer]    "

## 2020-10-22 ENCOUNTER — HOSPITAL ENCOUNTER (OUTPATIENT)
Dept: MAMMOGRAPHY | Facility: CLINIC | Age: 65
Discharge: HOME | End: 2020-10-22
Attending: FAMILY MEDICINE | Admitting: FAMILY MEDICINE
Payer: MEDICARE

## 2020-10-22 DIAGNOSIS — Z12.31 VISIT FOR SCREENING MAMMOGRAM: ICD-10-CM

## 2020-10-22 PROCEDURE — 77067 SCR MAMMO BI INCL CAD: CPT

## 2020-11-17 ENCOUNTER — OFFICE VISIT (OUTPATIENT)
Dept: DERMATOLOGY | Facility: CLINIC | Age: 65
End: 2020-11-17
Payer: MEDICARE

## 2020-11-17 VITALS — HEART RATE: 71 BPM | DIASTOLIC BLOOD PRESSURE: 84 MMHG | SYSTOLIC BLOOD PRESSURE: 145 MMHG | OXYGEN SATURATION: 98 %

## 2020-11-17 DIAGNOSIS — D48.5 NEOPLASM OF UNCERTAIN BEHAVIOR OF SKIN: Primary | ICD-10-CM

## 2020-11-17 DIAGNOSIS — L81.4 LENTIGO: ICD-10-CM

## 2020-11-17 DIAGNOSIS — L82.1 SEBORRHEIC KERATOSIS: ICD-10-CM

## 2020-11-17 DIAGNOSIS — Z85.828 HISTORY OF BASAL CELL CARCINOMA (BCC) OF SKIN: ICD-10-CM

## 2020-11-17 DIAGNOSIS — D22.9 NEVUS: ICD-10-CM

## 2020-11-17 DIAGNOSIS — D18.01 ANGIOMA OF SKIN: ICD-10-CM

## 2020-11-17 PROCEDURE — 88305 TISSUE EXAM BY PATHOLOGIST: CPT | Performed by: DERMATOLOGY

## 2020-11-17 PROCEDURE — 99214 OFFICE O/P EST MOD 30 MIN: CPT | Mod: 25 | Performed by: PHYSICIAN ASSISTANT

## 2020-11-17 PROCEDURE — 11103 TANGNTL BX SKIN EA SEP/ADDL: CPT | Performed by: PHYSICIAN ASSISTANT

## 2020-11-17 PROCEDURE — 11102 TANGNTL BX SKIN SINGLE LES: CPT | Performed by: PHYSICIAN ASSISTANT

## 2020-11-17 NOTE — PATIENT INSTRUCTIONS
Wound Care Instructions     FOR SUPERFICIAL WOUNDS     Left ankle superior  Left ankle inferior  Left scapula  Left elbow    [facility] [phone]    [facility] [phone]                       AFTER 24 HOURS YOU SHOULD REMOVE THE BANDAGE AND BEGIN DAILY DRESSING CHANGES AS FOLLOWS:     1) Remove Dressing.     2) Clean and dry the area with tap water using a Q-tip or sterile gauze pad.     3) Apply Vaseline, Aquaphor, Polysporin ointment or Bacitracin ointment over entire wound.  Do NOT use Neosporin ointment.     4) Cover the wound with a band-aid, or a sterile non-stick gauze pad and micropore paper tape      REPEAT THESE INSTRUCTIONS AT LEAST ONCE A DAY UNTIL THE WOUND HAS COMPLETELY HEALED.    It is an old wives tale that a wound heals better when it is exposed to air and allowed to dry out. The wound will heal faster with a better cosmetic result if it is kept moist with ointment and covered with a bandage.    **Do not let the wound dry out.**      Supplies Needed:      *Cotton tipped applicators (Q-tips)    *Polysporin Ointment or Bacitracin Ointment (NOT NEOSPORIN)    *Band-aids or non-stick gauze pads and micropore paper tape.      PATIENT INFORMATION:    During the healing process you will notice a number of changes. All wounds develop a small halo of redness surrounding the wound.  This means healing is occurring. Severe itching with extensive redness usually indicates sensitivity to the ointment or bandage tape used to dress the wound.  You should call our office if this develops.      Swelling  and/or discoloration around your surgical site is common, particularly when performed around the eye.    All wounds normally drain.  The larger the wound the more drainage there will be.  After 7-10 days, you will notice the wound beginning to shrink and new skin will begin to grow.  The wound is healed when you can see skin has formed over the entire area.  A healed wound has  a healthy, shiny look to the surface and is red to dark pink in color to normalize.  Wounds may take approximately 4-6 weeks to heal.  Larger wounds may take 6-8 weeks.  After the wound is healed you may discontinue dressing changes.    You may experience a sensation of tightness as your wound heals. This is normal and will gradually subside.    Your healed wound may be sensitive to temperature changes. This sensitivity improves with time, but if you re having a lot of discomfort, try to avoid temperature extremes.    Patients frequently experience itching after their wound appears to have healed because of the continue healing under the skin.  Plain Vaseline will help relieve the itching.        POSSIBLE COMPLICATIONS    BLEEDIN. Leave the bandage in place.  2. Use tightly rolled up gauze or a cloth to apply direct pressure over the bandage for 30  minutes.  3. Reapply pressure for an additional 30 minutes if necessary  4. Use additional gauze and tape to maintain pressure once the bleeding has stopped.      WOUND CARE INSTRUCTIONS   FOR CRYOSURGERY   Mid back  This area treated with liquid nitrogen should form a blister (areas treated may or may not blister-skin may just turn dark and slough off). You do not need to bandage the area unless a blister forms and breaks (which may be a few days). When the blister breaks, begin daily dressing changes as follows:  1) Clean and dry the area with tap water using clean Q-tip or sterile gauze pad.   2) Apply Polysporin ointment or Bacitracin ointment over entire wound. Do NOT use Neosporin ointment.   3) Cover the wound with a band-aid or sterile non-stick gauze pad and micropore paper tape.   REPEAT THESE INSTRUCTIONS AT LEAST ONCE A DAY UNTIL THE WOUND HAS COMPLETELY HEALED.   It is an old wives tale that a wound heals better when it is exposed to air and allowed to dry out. The wound will heal faster with a better cosmetic result if it is kept moist with ointment  and covered with a bandage.   Do not let the wound dry out.   IMPORTANT INFORMATION ON REVERSE SIDE   Supplies Needed:   *Cotton tipped applicators (Q-tips)   *Polysporin ointment or Bacitracin ointment (NOT NEOSPORIN)   *Band-aids, or non stick gauze pads and micropore paper tape   PATIENT INFORMATION   During the healing process you will notice a number of changes. All wounds develop a small halo of redness surrounding the wound. This means healing is occurring. Severe itching with extensive redness usually indicates sensitivity to the ointment or bandage tape used to dress the wound. You should call our office if this develops.   Swelling and/or discoloration around your surgical site is common, particularly when performed around the eye.   All wounds normally drain. The larger the wound the more drainage there will be. After 7-10 days, you will notice the wound beginning to shrink and new skin will begin to grow. The wound is healed when you can see skin has formed over the entire area. A healed wound has a healthy, shiny look to the surface and is red to dark pink in color to normalize. Wounds may take approximately 4-6 weeks to heal. Larger wounds may take 6-8 weeks. After the wound is healed you may discontinue dressing changes.   You may experience a sensation of tightness as your wound heals. This is normal and will gradually subside.   Your healed wound may be sensitive to temperature changes. This sensitivity improves with time, but if you re having a lot of discomfort, try to avoid temperature extremes.   Patients frequently experience itching after their wound appears to have healed because of the continue healing under the skin. Plain Vaseline will help relieve the itching.

## 2020-11-17 NOTE — PROGRESS NOTES
HPI:   Chief complaints: Jolynn Haji is a 65 year old female who presents for Full skin cancer screening to rule out skin cancer   Last Skin Exam: 2018 1st Baseline: no  Personal HX of Skin Cancer: yes multiple BCC   Personal HX of Malignant Melanoma: no   Family HX of Skin Cancer / Malignant Melanoma: no  Personal HX of Atypical Moles:   no  Risk factors: history of sun exposure and burns; history of blistering burns in the past; history of tanning bed use in the past  New / Changing lesions:yes one spot on the left elbow present for several months and is itchy  Social History: has 2 children and 2 grandchildren  On review of systems, there are no further skin complaints, patient is feeling otherwise well.  See patient intake sheet.  ROS of the following were done and are negative: Constitutional, Eyes, Ears, Nose,   Mouth, Throat, Cardiovascular, Respiratory, GI, Genitourinary, Musculoskeletal,   Psychiatric, Endocrine, Allergic/Immunologic.    PHYSICAL EXAM:   BP (!) 156/82   Pulse 71   LMP 06/28/2001   SpO2 98%   Skin exam performed as follows: Type 2 skin. Mood appropriate  Alert and Oriented X 3. Well developed, well nourished in no distress.  General appearance: Normal  Head including face: Normal  Eyes: conjunctiva and lids: Normal  Mouth: Lips, teeth, gums: Normal  Neck: Normal  Chest-breast/axillae: Normal  Back: Normal  Spleen and liver: Normal  Cardiovascular: Exam of peripheral vascular system by observation for swelling, varicosities, edema: Normal  Genitalia: groin, buttocks: Normal  Extremities: digits/nails (clubbing): Normal  Eccrine and Apocrine glands: Normal  Right upper extremity: Normal  Left upper extremity: Normal  Right lower extremity: Normal  Left lower extremity: Normal  Skin: Scalp and body hair: See below    Pt deferred exam of breasts, groin, buttocks: No    Other physical findings:  1. Multiple pigmented macules on extremities and trunk  2. Multiple pigmented macules on face,  trunk and extremities  3. Multiple vascular papules on trunk, arms and legs  4. Multiple scattered keratotic plaques  5. 10 mm pink scaly plaque on the left elbow  6. 5 mm pink papule on the left ankle superior  7. 4 mm pink papule on the left ankle inferior  8. 3 mm pink papule on the left scapula       Except as noted above, no other signs of skin cancer or melanoma.     ASSESSMENT/PLAN:   Benign Full skin cancer screening today. . Patient with history of multiple BCC  Advised on monthly self exams and 1 year  Patient Education: Appropriate brochures given.    1. Multiple benign appearing nevi on arms, legs and trunk. Discussed ABCDEs of melanoma and sunscreen.   2. Multiple lentigos on arms, legs and trunk. Advised benign, no treatment needed.  3. Multiple scattered angiomas. Advised benign, no treatment needed.   4. Seborrheic keratosis on arms, legs and trunk. Advised benign, no treatment needed.  5. R/o SCC on the left elbow, left ankle superior, left ankle inferior. Shave biopsy performed.  Area cleaned and anesthetized with 1% lidocaine with epinephrine.  Dermablade used to remove the lesion and sent to pathology. Bleeding was cauterized. Pt tolerated procedure well with no complications.   6. R/o BCC on the left scapula. Shave biopsy performed.  Area cleaned and anesthetized with 1% lidocaine with epinephrine.  Dermablade used to remove the lesion and sent to pathology. Bleeding was cauterized. Pt tolerated procedure well with no complications.   7. Azalea to follow up with Primary Care provider regarding elevated blood pressure.            Follow-up: yearly FSE/PRN sooner    1.) Patient was asked about new and changing moles. YES  2.) Patient received a complete physical skin examination: YES  3.) Patient was counseled to perform a monthly self skin examination: YES  Scribed By: Ligia Carbajal, MS, PA-C

## 2020-11-20 ENCOUNTER — TELEPHONE (OUTPATIENT)
Dept: DERMATOLOGY | Facility: CLINIC | Age: 65
End: 2020-11-20

## 2020-11-20 LAB — COPATH REPORT: NORMAL

## 2020-11-20 NOTE — TELEPHONE ENCOUNTER
----- Message from Ligia Carbajal PA-C sent at 11/20/2020  3:27 PM CST -----  Left elbow, left ankle superior SCIS, Left ankle inferior, left scapula BCC    Schedule excision (4 total)

## 2020-11-20 NOTE — LETTER
[address]  [address]  [phone]    11/23/2020       Jolynn Haji  [address]  [facility] [address]      Dear Jolynn:    You are scheduled for Mohs Surgery on: 1/7/21 @9:15am & 1/14/21 @9:15am.    Please check in at [facility], [address].     You don't need to arrive more than 5-10 minutes prior to your appointment time.     Be sure to eat a good breakfast and bathe and wash your hair prior to surgery.     If you are taking any anti-coagulants that are prescribed by your Doctor (such as Coumadin/Warfarin, Plavix, Aspirin, Ibuprofen), please continue taking them.     However, if you are taking anti-coagulants over the counter without a Doctor's order for a medical condition, please discontinue them 10 days prior to surgery.     Please wear loose comfortable clothing as it could possibly be 4-6 hours until your surgery is completed depending upon how many layers of tissue need to be removed.      Thank you,    JASBIR Rogers MD

## 2020-11-23 NOTE — TELEPHONE ENCOUNTER
Called and LM for patient to call back in regards to biopsy results x4.    LEXUS Xie-BSN-PHN  [facility]  [phone]

## 2020-11-23 NOTE — TELEPHONE ENCOUNTER
Called and spoke to patient.    Educated patient on biopsy results- SCIS x2 (mohs) + BCC x2 (mohs).    Educated patient on SCIS, BCC, mohs, scheduled mohs appointment x2 (per Dr. Rogers), and letter/packet sent.    Patient voiced understanding.    Anne-Marie RN-BSN-PHN  [facility]  [phone]

## 2020-11-23 NOTE — TELEPHONE ENCOUNTER
Azalea returned a call to derm, not able to reach Willow.     Azalea requests a return call on her cell phone: [phone]

## 2021-01-07 ENCOUNTER — OFFICE VISIT (OUTPATIENT)
Dept: DERMATOLOGY | Facility: CLINIC | Age: 66
End: 2021-01-07
Payer: MEDICARE

## 2021-01-07 VITALS — HEART RATE: 81 BPM | OXYGEN SATURATION: 99 % | DIASTOLIC BLOOD PRESSURE: 73 MMHG | SYSTOLIC BLOOD PRESSURE: 114 MMHG

## 2021-01-07 DIAGNOSIS — D04.72: ICD-10-CM

## 2021-01-07 DIAGNOSIS — C44.719 BASAL CELL CARCINOMA (BCC) OF SKIN OF LEFT ANKLE: Primary | ICD-10-CM

## 2021-01-07 PROCEDURE — 17311 MOHS 1 STAGE H/N/HF/G: CPT | Mod: 59 | Performed by: DERMATOLOGY

## 2021-01-07 PROCEDURE — 17311 MOHS 1 STAGE H/N/HF/G: CPT | Performed by: DERMATOLOGY

## 2021-01-07 NOTE — PROGRESS NOTES
Surgical Office Location:  [facility]  [address] [place] [id number]

## 2021-01-07 NOTE — NURSING NOTE
"Initial /73   Pulse 81   LMP 06/28/2001   SpO2 99%  Estimated body mass index is 23.78 kg/m  as calculated from the following:    Height as of 12/11/19: 1.753 m (5' 9\").    Weight as of 12/11/19: 73 kg (161 lb). .      "

## 2021-01-07 NOTE — LETTER
2021         RE: Jolynn Haji  [address] [place] [address]        Dear Colleague,    Thank you for referring your patient, Jolynn Haji, to the [facility]. Please see a copy of my visit note below.    Surgical Office Location:  [facility]  [address] [place] [address]      Jolynn Haji is an extremely pleasant 65 year old year old female patient here today for emo f squamous cell carcinoma in situ and basal cell carcinoma on left ankle. Patient has no other skin complaints today.  Remainder of the HPI, Meds, PMH, Allergies, FH, and SH was reviewed in chart.      Past Medical History:   Diagnosis Date     Abnormal glandular Papanicolaou smear of cervix- ASCUS in 2003    [facility]-      Basal cell carcinoma of leg, right 2016    Dr Rogers     Colon polyps 2018    tubular adenoam - due 5 yrs - 2 mm     Concussion without loss of consciousness 2017     Hidradenitis     left  groin     HSIL (high grade squamous intraepithelial lesion) on Pap smear of cervix 2000    Normal paps from  to /     Hypertension goal BP (blood pressure) < 140/90      OA (osteoarthritis) of knee     moderate     Postmenopausal since 2008      Shingles 2010    left      Synovial cyst of popliteal space        Past Surgical History:   Procedure Laterality Date     COLONOSCOPY  2018    polyps, tubular - 2 mm - due 5 yrs     ZZC NONSPECIFIC PROCEDURE      Colposcopy        Family History   Problem Relation Age of Onset     Thyroid Disease Mother         Graves disease     Hypertension Mother      Diabetes Mother         type 2     Blood Disease Mother          from leukemia at age 78     Diabetes Daughter         Juvenile Diabetes     Heart Disease Father 62        MV problem with CHF  at 62/ from MI     Hypertension Father      Diabetes Son         Juvenile Diabetes     Cancer Maternal Uncle          type ??     Cancer Maternal Uncle         liver       Social History     Socioeconomic History     Marital status:      Spouse name: Zach     Number of children: 2     Years of education: 16     Highest education level: Not on file   Occupational History     Occupation: teacher first grade -retired at age 60     Comment: [employer]     Employer: [employer]   Social Needs     Financial resource strain: Not on file     Food insecurity     Worry: Not on file     Inability: Not on file     Transportation needs     Medical: Not on file     Non-medical: Not on file   Tobacco Use     Smoking status: Never Smoker     Smokeless tobacco: Never Used   Substance and Sexual Activity     Alcohol use: Yes     Alcohol/week: 0.0 - 1.0 standard drinks     Comment: 2 per month     Drug use: No     Comment: no herbal meds either     Sexual activity: Yes     Partners: Male     Birth control/protection: Surgical     Comment: -was on DepoProvera since 2000-2/2008 -  had vasectomy   Lifestyle     Physical activity     Days per week: Not on file     Minutes per session: Not on file     Stress: Not on file   Relationships     Social connections     Talks on phone: Not on file     Gets together: Not on file     Attends Synagogue service: Not on file     Active member of club or organization: Not on file     Attends meetings of clubs or organizations: Not on file     Relationship status: Not on file     Intimate partner violence     Fear of current or ex partner: Not on file     Emotionally abused: Not on file     Physically abused: Not on file     Forced sexual activity: Not on file   Other Topics Concern      Service No     Blood Transfusions No     Caffeine Concern No     Occupational Exposure No     Hobby Hazards No     Sleep Concern No     Stress Concern No     Weight Concern No     Special Diet No     Back Care No     Exercise Yes     Comment: regular      Bike Helmet No     Seat  Belt Yes     Comment: always     Self-Exams Yes     Comment: SBE  encouraged monthly     Parent/sibling w/ CABG, MI or angioplasty before 65F 55M? No   Social History Narrative    calcium - 3 +dairy servings/day and/or calcium supplement daily     flex sig/colonoscopy -referred 11/05 - was postponed by [place] [facility] - will pt another referral  3/14/06, didn't go - referred again 6/27/07 = normal.     sun precautions - discussed    mammogram - yearly     Td booster -1995, 2/14/05     pneumovax -at age [age]    DEXA -normal 6/2005- recheck this year 2008 - referral given 2/2008    stool hemoccults - every year after age 40    ASA- start 81mg asa qday    mulvitamin - encouraged    Last menses about 7/08.         no hot flashes- 2005 - was on Depoprovera 2000- 2/2008        Outpatient Encounter Medications as of 1/7/2021   Medication Sig Dispense Refill     clindamycin (CLINDAGEL) 1 % external gel Apply topically daily 60 g 11     escitalopram (LEXAPRO) 10 MG tablet Take 1 tablet (10 mg) by mouth daily 90 tablet 3     lisinopril (PRINIVIL/ZESTRIL) 5 MG tablet Take 1 tablet (5 mg) by mouth daily 90 tablet 3     No facility-administered encounter medications on file as of 1/7/2021.              Review Of Systems  Skin: As above  Eyes: negative  Ears/Nose/Throat: negative  Respiratory: No shortness of breath, dyspnea on exertion, cough, or hemoptysis  Cardiovascular: negative  Gastrointestinal: negative  Genitourinary: negative  Musculoskeletal: negative  Neurologic: negative  Psychiatric: negative  Hematologic/Lymphatic/Immunologic: negative  Endocrine: negative      O:   NAD, WDWN, Alert & Oriented, Mood & Affect wnl, Vitals stable   Here today alone   /73   Pulse 81   LMP 06/28/2001   SpO2 99%    General appearance normal   Vitals stable   Alert, oriented and in no acute distress     L ankle superior ill-defined 7mm red scaly papule  L ankle inferior 5mm pink pearly papule       Eyes: Conjunctivae/lids:Normal      ENT: Lips, buccal mucosa, tongue: normal    MSK:Normal    Cardiovascular: peripheral edema none    Pulm: Breathing Normal    Neuro/Psych: Orientation:Alert and Orientedx3 ; Mood/Affect:normal       A/P:  1. L ankle superior squamous cell carcinoma in situ   MOHS:   Location and Ill-defined margins    The rationale for Mohs surgery was discussed with the patient and consent was obtained.  The risks and benefits as well as alternatives to therapy were discussed, in detail.  Specifically, the risks of infection, scarring, bleeding, prolonged wound healing, incomplete removal, allergy to anesthesia, nerve injury and recurrence were addressed.  Indication for Mohs was Location and Ill-defined margins. Prior to the procedure, the treatment site was clearly identified and, if available, confirmed with previous photos and confirmed by the patient   All components of the Universal Protocol/PAUSE rule were completed.  The Mohs surgeon operated in two distinct and integrated capacities as the surgeon and pathologist.      The area was prepped with Betasept.  A rim of normal appearing skin was marked circumferentially around the lesion.  The area was infiltrated with local anesthesia.  The tumor was first debulked to remove all clinically apparent tumor.  An incision following the standard Mohs approach was done and the specimen was oriented,mapped and placed in 1 block(s).  Each specimen was then chromacoded and processed in the Mohs laboratory using standard Mohs technique and submitted for frozen section histology.  Frozen section analysis showed no residual tumor but CLEAR MARGINS.      The tumor was excised using standard Mohs technique in 1 stages(s).  CLEAR MARGINS OBTAINED and Final defect size was 1.3 x 1.2 cm.     We discussed the options for wound management in full with the patient including risks/benefits/ possible outcomes.        REPAIR SECOND INTENT: We discussed the options for wound management in full with  the patient including risks/benefits/possible outcomes. Decision made to allow the wound to heal by second intention. EBL minimal; complications none; wound care routine.  The patient was discharged in good condition and will return in one month or prn for wound evaluation.    2. L ankle inf basal cell carcinoma   MOHS:   Location    The rationale for Mohs surgery was discussed with the patient and consent was obtained.  The risks and benefits as well as alternatives to therapy were discussed, in detail.  Specifically, the risks of infection, scarring, bleeding, prolonged wound healing, incomplete removal, allergy to anesthesia, nerve injury and recurrence were addressed.  Indication for Mohs was Location. Prior to the procedure, the treatment site was clearly identified and, if available, confirmed with previous photos and confirmed by the patient   All components of the Universal Protocol/PAUSE rule were completed.  The Mohs surgeon operated in two distinct and integrated capacities as the surgeon and pathologist.      The area was prepped with Betasept.  A rim of normal appearing skin was marked circumferentially around the lesion.  The area was infiltrated with local anesthesia.  The tumor was first debulked to remove all clinically apparent tumor.  An incision following the standard Mohs approach was done and the specimen was oriented,mapped and placed in 1 block(s).  Each specimen was then chromacoded and processed in the Mohs laboratory using standard Mohs technique and submitted for frozen section histology.  Frozen section analysis showed no residual tumor but CLEAR MARGINS.      The tumor was excised using standard Mohs technique in 1 stages(s).  CLEAR MARGINS OBTAINED and Final defect size was 1 cm.     We discussed the options for wound management in full with the patient including risks/benefits/ possible outcomes.        REPAIR SECOND INTENT: We discussed the options for wound management in full with  the patient including risks/benefits/possible outcomes. Decision made to allow the wound to heal by second intention. EBL minimal; complications none; wound care routine.  The patient was discharged in good condition and will return in one month or prn for wound evaluation.     It was a pleasure speaking to Jolynn Haji today.  Signs and Symptoms of skin cancer discussed with patient.  Patient encouraged to perform monthly skin exams.  UV precautions reviewed with patient.  Return to clinic next appt        Again, thank you for allowing me to participate in the care of your patient.        Sincerely,        Sheldon Rogers MD

## 2021-01-07 NOTE — PROGRESS NOTES
Jolynn Haji is an extremely pleasant 65 year old year old female patient here today for emo f squamous cell carcinoma in situ and basal cell carcinoma on left ankle. Patient has no other skin complaints today.  Remainder of the HPI, Meds, PMH, Allergies, FH, and SH was reviewed in chart.      Past Medical History:   Diagnosis Date     Abnormal glandular Papanicolaou smear of cervix- ASCUS in 2003    [facility]-      Basal cell carcinoma of leg, right 2016    Dr Rogers     Colon polyps 2018    tubular adenoam - due 5 yrs - 2 mm     Concussion without loss of consciousness 2017     Hidradenitis     left  groin     HSIL (high grade squamous intraepithelial lesion) on Pap smear of cervix 2000    Normal paps from  to /     Hypertension goal BP (blood pressure) < 140/90      OA (osteoarthritis) of knee     moderate     Postmenopausal since 2008      Shingles 2010    left      Synovial cyst of popliteal space        Past Surgical History:   Procedure Laterality Date     COLONOSCOPY  2018    polyps, tubular - 2 mm - due 5 yrs     ZZC NONSPECIFIC PROCEDURE      Colposcopy        Family History   Problem Relation Age of Onset     Thyroid Disease Mother         Graves disease     Hypertension Mother      Diabetes Mother         type 2     Blood Disease Mother          from leukemia at age 78     Diabetes Daughter         Juvenile Diabetes     Heart Disease Father 62        MV problem with CHF  at 62/ from MI     Hypertension Father      Diabetes Son         Juvenile Diabetes     Cancer Maternal Uncle         type ??     Cancer Maternal Uncle         liver       Social History     Socioeconomic History     Marital status:      Spouse name: Zach     Number of children: 2     Years of education: 16     Highest education level: Not on file   Occupational History     Occupation: teacher first grade -retired at age 60     Comment: [employer]      Employer: [employer]   Social Needs     Financial resource strain: Not on file     Food insecurity     Worry: Not on file     Inability: Not on file     Transportation needs     Medical: Not on file     Non-medical: Not on file   Tobacco Use     Smoking status: Never Smoker     Smokeless tobacco: Never Used   Substance and Sexual Activity     Alcohol use: Yes     Alcohol/week: 0.0 - 1.0 standard drinks     Comment: 2 per month     Drug use: No     Comment: no herbal meds either     Sexual activity: Yes     Partners: Male     Birth control/protection: Surgical     Comment: -was on DepoProvera since 2000-2/2008 -  had vasectomy   Lifestyle     Physical activity     Days per week: Not on file     Minutes per session: Not on file     Stress: Not on file   Relationships     Social connections     Talks on phone: Not on file     Gets together: Not on file     Attends Sabianist service: Not on file     Active member of club or organization: Not on file     Attends meetings of clubs or organizations: Not on file     Relationship status: Not on file     Intimate partner violence     Fear of current or ex partner: Not on file     Emotionally abused: Not on file     Physically abused: Not on file     Forced sexual activity: Not on file   Other Topics Concern      Service No     Blood Transfusions No     Caffeine Concern No     Occupational Exposure No     Hobby Hazards No     Sleep Concern No     Stress Concern No     Weight Concern No     Special Diet No     Back Care No     Exercise Yes     Comment: regular      Bike Helmet No     Seat Belt Yes     Comment: always     Self-Exams Yes     Comment: SBE  encouraged monthly     Parent/sibling w/ CABG, MI or angioplasty before 65F 55M? No   Social History Narrative    calcium - 3 +dairy servings/day and/or calcium supplement daily     flex sig/colonoscopy -referred 11/05 - was postponed by [facility] - will pt another referral  3/14/06,  didn't go - referred again 6/27/07 = normal.     sun precautions - discussed    mammogram - yearly     Td booster -1995, 2/14/05     pneumovax -at age [age]    DEXA -normal 6/2005- recheck this year 2008 - referral given 2/2008    stool hemoccults - every year after age 40    ASA- start 81mg asa qday    mulvitamin - encouraged    Last menses about 7/08.         no hot flashes- 2005 - was on Depoprovera 2000- 2/2008        Outpatient Encounter Medications as of 1/7/2021   Medication Sig Dispense Refill     clindamycin (CLINDAGEL) 1 % external gel Apply topically daily 60 g 11     escitalopram (LEXAPRO) 10 MG tablet Take 1 tablet (10 mg) by mouth daily 90 tablet 3     lisinopril (PRINIVIL/ZESTRIL) 5 MG tablet Take 1 tablet (5 mg) by mouth daily 90 tablet 3     No facility-administered encounter medications on file as of 1/7/2021.              Review Of Systems  Skin: As above  Eyes: negative  Ears/Nose/Throat: negative  Respiratory: No shortness of breath, dyspnea on exertion, cough, or hemoptysis  Cardiovascular: negative  Gastrointestinal: negative  Genitourinary: negative  Musculoskeletal: negative  Neurologic: negative  Psychiatric: negative  Hematologic/Lymphatic/Immunologic: negative  Endocrine: negative      O:   NAD, WDWN, Alert & Oriented, Mood & Affect wnl, Vitals stable   Here today alone   /73   Pulse 81   LMP 06/28/2001   SpO2 99%    General appearance normal   Vitals stable   Alert, oriented and in no acute distress     L ankle superior ill-defined 7mm red scaly papule  L ankle inferior 5mm pink pearly papule       Eyes: Conjunctivae/lids:Normal     ENT: Lips, buccal mucosa, tongue: normal    MSK:Normal    Cardiovascular: peripheral edema none    Pulm: Breathing Normal    Neuro/Psych: Orientation:Alert and Orientedx3 ; Mood/Affect:normal       A/P:  1. L ankle superior squamous cell carcinoma in situ   MOHS:   Location and Ill-defined margins    The rationale for Mohs surgery was discussed with  the patient and consent was obtained.  The risks and benefits as well as alternatives to therapy were discussed, in detail.  Specifically, the risks of infection, scarring, bleeding, prolonged wound healing, incomplete removal, allergy to anesthesia, nerve injury and recurrence were addressed.  Indication for Mohs was Location and Ill-defined margins. Prior to the procedure, the treatment site was clearly identified and, if available, confirmed with previous photos and confirmed by the patient   All components of the Universal Protocol/PAUSE rule were completed.  The Mohs surgeon operated in two distinct and integrated capacities as the surgeon and pathologist.      The area was prepped with Betasept.  A rim of normal appearing skin was marked circumferentially around the lesion.  The area was infiltrated with local anesthesia.  The tumor was first debulked to remove all clinically apparent tumor.  An incision following the standard Mohs approach was done and the specimen was oriented,mapped and placed in 1 block(s).  Each specimen was then chromacoded and processed in the Mohs laboratory using standard Mohs technique and submitted for frozen section histology.  Frozen section analysis showed no residual tumor but CLEAR MARGINS.      The tumor was excised using standard Mohs technique in 1 stages(s).  CLEAR MARGINS OBTAINED and Final defect size was 1.3 x 1.2 cm.     We discussed the options for wound management in full with the patient including risks/benefits/ possible outcomes.        REPAIR SECOND INTENT: We discussed the options for wound management in full with the patient including risks/benefits/possible outcomes. Decision made to allow the wound to heal by second intention. EBL minimal; complications none; wound care routine.  The patient was discharged in good condition and will return in one month or prn for wound evaluation.    2. L ankle inf basal cell carcinoma   MOHS:   Location    The rationale for  Mohs surgery was discussed with the patient and consent was obtained.  The risks and benefits as well as alternatives to therapy were discussed, in detail.  Specifically, the risks of infection, scarring, bleeding, prolonged wound healing, incomplete removal, allergy to anesthesia, nerve injury and recurrence were addressed.  Indication for Mohs was Location. Prior to the procedure, the treatment site was clearly identified and, if available, confirmed with previous photos and confirmed by the patient   All components of the Universal Protocol/PAUSE rule were completed.  The Mohs surgeon operated in two distinct and integrated capacities as the surgeon and pathologist.      The area was prepped with Betasept.  A rim of normal appearing skin was marked circumferentially around the lesion.  The area was infiltrated with local anesthesia.  The tumor was first debulked to remove all clinically apparent tumor.  An incision following the standard Mohs approach was done and the specimen was oriented,mapped and placed in 1 block(s).  Each specimen was then chromacoded and processed in the Mohs laboratory using standard Mohs technique and submitted for frozen section histology.  Frozen section analysis showed no residual tumor but CLEAR MARGINS.      The tumor was excised using standard Mohs technique in 1 stages(s).  CLEAR MARGINS OBTAINED and Final defect size was 1 cm.     We discussed the options for wound management in full with the patient including risks/benefits/ possible outcomes.        REPAIR SECOND INTENT: We discussed the options for wound management in full with the patient including risks/benefits/possible outcomes. Decision made to allow the wound to heal by second intention. EBL minimal; complications none; wound care routine.  The patient was discharged in good condition and will return in one month or prn for wound evaluation.     It was a pleasure speaking to Jolynn Haji today.  Signs and Symptoms of skin  cancer discussed with patient.  Patient encouraged to perform monthly skin exams.  UV precautions reviewed with patient.  Return to clinic next appt

## 2021-01-10 ASSESSMENT — ACTIVITIES OF DAILY LIVING (ADL): CURRENT_FUNCTION: NO ASSISTANCE NEEDED

## 2021-01-11 ASSESSMENT — ACTIVITIES OF DAILY LIVING (ADL): CURRENT_FUNCTION: NO ASSISTANCE NEEDED

## 2021-01-11 NOTE — PATIENT INSTRUCTIONS
Patient Education   Personalized Prevention Plan  You are due for the preventive services outlined below.  Your care team is available to assist you in scheduling these services.  If you have already completed any of these items, please share that information with your care team to update in your medical record.  Health Maintenance Due   Topic Date Due     ANNUAL REVIEW OF HM ORDERS  1955     Osteoporosis Screening  10/05/2020     Basic Metabolic Panel  12/11/2020     Kidney Microalbumin Urine Test  12/11/2020     FALL RISK ASSESSMENT  12/11/2020     Pneumococcal Vaccine (2 of 2 - PPSV23) 12/11/2020         Understanding Rotator Cuff Tendonitis    Tendons are tough tissues that connect muscles to bone. A group of 4 muscles and their tendons form a  cuff  around the head of the upper arm bone. This is called the rotator cuff. It connects the upper arm to the shoulder blade. It gives the shoulder joint stability and strength.  If tendons are injured or strained, they may become irritated and swollen (inflamed). This is called tendonitis. Rotator cuff tendonitis may cause shoulder pain and loss of function.  What causes rotator cuff tendonitis?  Tendonitis results when the rotator cuff tendons are injured or overworked. The most common cause of injury is repetitive overhead activities. These can be work-related activities such as reaching, pushing, or lifting. Or they can be sports-related activities such as throwing, swimming, or lifting weights.  Symptoms of rotator cuff tendonitis  Pain on the side of the upper arm is the most common symptom. Pain may get worse with overhead movements or when you raise the arm above shoulder level. It may also hurt to lie on the shoulder at night.  Treatment for rotator cuff tendonitis  Treatment may include the following:    Active rest. This lets the rotator cuff heal. Active rest means using your arm and shoulder, but avoiding activities that cause pain, such as reaching  overhead or sleeping on the shoulder.    Cold packs. Putting ice packs on the shoulder helps reduce swelling and relieve pain.    Pain medicines. Prescription or over-the-counter pain medicines can help relieve pain and swelling.    Arm and shoulder exercises. These help keep the shoulder joint mobile as it heals. They also help improve the strength of muscles around the joint.  Possible complications  It might be tempting to stop using your shoulder completely to avoid pain. But doing so may lead to a condition called  frozen shoulder.  To help prevent this, following instructions you are given for active rest and for doing exercises to help your shoulder heal.  When to call your healthcare provider  Call your healthcare provider right away if you have any of these:    Fever of 100.4 F (38 C) or higher, or as directed    Symptoms that don t get better, or get worse    New symptoms   Date Last Reviewed: 3/10/2016    9187-2813 [employer]. [address] [place] [address]. All rights reserved. This information is not intended as a substitute for professional medical care. Always follow your healthcare professional's instructions.           Patient Education     Exercises for Shoulder Flexibility: External Rotation    This stretch can help restore shoulder flexibility and relieve pain over time. When stretching, be sure to breathe deeply. Follow any special instructions from your healthcare provider or physical therapist:  1.  a doorway. Grasp the doorjamb with the hand on the frozen side. Your arm should be bent.  2. With the other hand, hold the elbow on the side with the involved frozen (stiff) shoulder firmly against your body.  3. Standing in the same spot, rotate your body away from the doorjamb. Stop when you feel the stretch in the shoulder. At first, try to hold the stretch for 5 seconds.  4. Work up to doing 3 sets of this stretch, 3 times a day. Work up to holding the stretch  for 30 to 60 seconds.  Note: Keep your arms as still as you can. Over time, rotate your body a little more to enhance the stretch. But be careful not to twist your back.  Frozen shoulder is another name for adhesive capsulitis, which causes restricted movement in the shoulder. If you have frozen shoulder, this stretch may cause discomfort, especially when you first get started. A few months may pass before you achieve the results you want. But once your shoulder heals, it rarely becomes frozen again. So stick to your stretching program. If you have any questions, be sure to ask your healthcare provider.  Date Last Reviewed: 3/1/2018    [id number] [employer]. [address] [place] [address], [place] [address]. All rights reserved. This information is not intended as a substitute for professional medical care. Always follow your healthcare professional's instructions.           Patient Education     Exercises for Shoulder Flexibility: Internal Rotation    This stretch can help restore shoulder flexibility and relieve pain over time. When stretching, be sure to breathe deeply. Follow any special instructions from your healthcare provider or physical therapist.  1. While seated, move the arm on the side you want to stretch toward the middle of your back. The palm of your hand should face out.  2. Cup your other hand under the hand that s behind your back. Gently push your cupped hand upward until you feel the stretch in the shoulder. Try to hold the stretch for 5 seconds.  3. Work up to doing 3 sets of this stretch, 3 times a day. Work up to holding the stretch for 30 to 60 seconds.  Note: Keep your back straight. It s OK if your hand can t reach the middle of your back. Instead, start the stretch with your hand as close as you can get it to the middle of your back.  Frozen shoulder  Frozen shoulder is another name for adhesive capsulitis. This causes restricted movement in the shoulder. If you have frozen  shoulder, this stretch may cause discomfort, especially when you first get started. A few months may pass before you achieve the results you want. But once your shoulder heals, it rarely becomes frozen again. So stick to your stretching program. If you have any questions, be sure to ask your healthcare provider.   Date Last Reviewed: 12/1/2017 2000-2019 [employer]. [address] [place] [address], [place] [address]. All rights reserved. This information is not intended as a substitute for professional medical care. Always follow your healthcare professional's instructions.           Patient Education     Exercises for Shoulder Flexibility: Adduction (Reaching Across)    This stretch can help restore shoulder flexibility and relieve pain over time. When stretching, be sure to breathe deeply. And follow any special instructions from your doctor or physical therapist:  1. Put the hand from the side you want to stretch on your opposite shoulder. Your elbow should point away from your body. Try to raise your elbow as close to shoulder height as you can.  2. With your other hand, push the raised elbow toward the opposite shoulder. Avoid turning your head. Stop when you feel the stretch. Try to hold the stretch for 5 seconds.  3. Work up to doing 3 sets of this stretch, 3 times a day. Work up to holding the stretch for 30 to 60 seconds.  Note: Be sure to push your elbow across your chest, not up toward your chin. Over time, try to push your elbow farther across your chest to enhance the stretch.  Frozen shoulder is another name for adhesive capsulitis, which causes restricted movement in the shoulder. If you have frozen shoulder, this stretch may cause discomfort, especially when you first get started. A few months may pass before you achieve the results you want. Once your shoulder heals, it rarely becomes frozen again. So stick to your stretching program. If you have any questions, be sure to ask your  doctor.  Date Last Reviewed: 3/1/2018    9500-7290 [employer]. [address] [place] [address]. All rights reserved. This information is not intended as a substitute for professional medical care. Always follow your healthcare professional's instructions.           Patient Education     Exercises for Shoulder Flexibility: Back Scratch    Improving your flexibility can reduce pain. Stretching exercises also can help increase your range of pain-free motion. Breathe normally when you exercise. Try to use smooth, fluid movements. Never force a stretch.  Note: Follow any special instructions you are given. If you feel pain, stop the exercise. If the pain continues after stopping, call your healthcare provider.    Stand straight, placing the back of your hand on the side you want to stretch flat against your lower back.    Throw one end of a towel over your shoulder. Grab it behind your back with your other hand.    Pull down gently on the towel with your front arm. Let your back arm slide up as high as is comfortable. You ll feel a stretch in your shoulder. Hold the stretch for a few seconds.    Repeat 3 to 5 times. Build up to holding each stretch for 30 to 60 seconds.  For your safety, check with your healthcare provider before starting an exercise program.  Date Last Reviewed: 3/1/2018    [id number] [employer]. [address] [place] [address]. All rights reserved. This information is not intended as a substitute for professional medical care. Always follow your healthcare professional's instructions.           Patient Education     Exercises for Shoulder Flexibility: Wall Walk    Improving your flexibility can reduce pain. Stretching exercises also can help increase your range of pain-free motion. Breathe normally when you exercise. Use smooth, fluid movements.  Note: Follow any special instructions you are given. If you feel pain, stop the exercise. If the pain  continues after stopping, call your healthcare provider:    Stand with your shoulder about 2 feet from the wall.    Raise your arm to shoulder level and gently  walk  your fingers up the wall as high as you can.    Hold for a few seconds. Then walk your fingers back down.    Repeat 3 times. Move closer to the wall as you repeat.    Build up to holding each stretch for 30 seconds.  Caution: Do this stretch only if your healthcare provider recommends it. Don t do it when you are first injured.  Date Last Reviewed: 3/1/2018    [id number] "[employer]". [address] [place] [address]. All rights reserved. This information is not intended as a substitute for professional medical care. Always follow your healthcare professional's instructions.           Patient Education     Shoulder Exercises: Biceps Curl    This exercise stretches and strengthens your shoulders and arms. Before starting, read through all the instructions. During the exercise, breathe normally and use smooth movements. Stop if you feel any pain. If pain persists, call your healthcare provider.  Here are the steps for the biceps curl:     Hold a ____ pound weight in each hand, with your palms facing your body. Tuck your arms close to your sides. Ask your physical therapist how much weight to use.     Bend your left elbow and raise the weight to your left shoulder. As you lower that weight, bend your right elbow and raise the weight to your right shoulder. Continue to alternate arms.    Repeat ____ times. Do ____ sets ____ times a day.     CAUTION: Keep your arms close to your body throughout the exercise. Keep your wrists straight.   Date Last Reviewed: 11/1/2017 2000-2019 "[employer]". [address] [place] [address]. All rights reserved. This information is not intended as a substitute for professional medical care. Always follow your healthcare professional's instructions.           Patient Education      Shoulder Exercises: Internal Rotation    Strengthening exercises help make your injured shoulder more stable. To warm up, do flexibility (stretching) exercises first. Your healthcare provider will tell you what size hand weights to use for the strengthening exercise below. If you don t have hand weights, try using cans of soup instead:    With knees bent, lie on a firm surface. Using the hand on the same side as your injured shoulder, grasp a weight. Bend that arm to a right angle (90 degrees).    Rest your elbow on the floor.    Keeping your elbow next to your side, lower your forearm toward the floor, away from your body. Don't lower your hand all the way to the floor.    Slowly return your forearm to your side. Repeat.    Work up to 5 to 15 lifts.  Note: Support your head and neck with a pillow.  Date Last Reviewed: 3/1/2018    3342-7969 [employer]. [address] [place] [address]. All rights reserved. This information is not intended as a substitute for professional medical care. Always follow your healthcare professional's instructions.           Patient Education     Shoulder Exercises: Shoulder Press    This exercise stretches and strengthens your shoulders. Before starting, read through all the instructions. During the exercise, breathe normally and use smooth movements. Stop if you feel any pain. If pain persists, call your healthcare provider.    Hold a ____ pound weight in each hand, elbows at shoulder level, palms facing forward. Arms to the side and slightly forward. Ask your physical therapist how much weight to use.     Raise one arm up until it s almost straight. Hold for a second. Lower the weight, extending the other arm up.    Repeat ____ times with each arm. Do ____ sets ____ times a day.  CAUTION: If you have shoulder problems, consult your healthcare provider before doing this exercise. Keep your head and body still during the exercise. Only your arms should move.    Date Last Reviewed: 11/1/2017 2000-2019 [employer]. [address] [place] [address]. All rights reserved. This information is not intended as a substitute for professional medical care. Always follow your healthcare professional's instructions.           Patient Education     Shoulder Exercises: Side Raise    This exercise stretches and strengthens your shoulders. Before starting, read through all the instructions. During the exercise, breathe normally and use smooth movements. Stop if you feel any pain. If pain persists, call your healthcare provider.    Stand straight, holding a ____ pound weight in each hand, arms at sides, feet shoulder-width apart. Ask your physical therapist or healthcare provider how much weight to use.     Slowly extend your arms up and out until weights are at shoulder level. Slowly return to starting position.    Repeat ____ times. Do ____ sets ____ times a day.     CAUTION: Don t swing the weights or raise weights above shoulder level.   Date Last Reviewed: 11/1/2017 2000-2019 [employer]. [address] [place] [address]. All rights reserved. This information is not intended as a substitute for professional medical care. Always follow your healthcare professional's instructions.           Patient Education     Shoulder Exercises: Triceps Press    This exercise stretches and strengthens your shoulders. Before starting, read through all the instructions. During the exercise, breathe normally and use smooth movements. Stop if you feel any pain. If pain persists, call your healthcare provider.    Grasp a ___ pound  weight in each hand. Raise one arm overhead. Hold that arm close to your ear. Bend your elbow and lower the weight behind your head, as far as you can, being careful not to hit your head. Ask your physical therapist or healthcare provider how much weight to use.     Slowly straighten your elbow, extending your arm upward.  "Return to starting position.    Repeat ____ times with each arm. Do ____ sets ____ times a day.  CAUTION: Keep your head still and neck straight. Keep your arm close to your ear. Don t arch your back.   Date Last Reviewed: 11/1/2017 2000-2019 [employer]. [address] [place] [address]. All rights reserved. This information is not intended as a substitute for professional medical care. Always follow your healthcare professional's instructions.           Patient Education     Shoulder Exercises: Wall Pushup    Strengthening exercises help make your injured shoulder more stable by making the muscles that support your shoulder stronger. To warm up, do flexibility (stretching) exercises first.  Here are steps for the wall pushup:     With feet and hands shoulder-width apart, place your palms on the wall, standing about an arm s length away.    Keeping your knees straight and heels on the floor, bend your elbows and lean forward as far as you comfortably can. Your elbows should be pointing downward. Then push away from the wall to the starting position. Repeat.    Work up to 15 wall pushups.     Note: Wear shoes that keep you from slipping.   Date Last Reviewed: 12/1/2017 2000-2019 [employer]. [address] [place] [address]. All rights reserved. This information is not intended as a substitute for professional medical care. Always follow your healthcare professional's instructions.           Patient Education     \"Reaching Up\" for Shoulder Flexibility    This stretch can help restore shoulder flexibility and relieve pain over time. When stretching, be sure to breathe deeply. And follow any special instructions from your healthcare provider or therapist.  4. Raise the hand on the side you want to stretch as high as you can. Then grasp a stable surface, such as a bookcase or a doorframe, with the same hand.  5. Keeping your arm straight, lower your body by bending " your knees. Stop when you feel the stretch in the shoulder. Try to hold the stretch for 5 seconds.  6. Work up to doing 3 sets of this stretch, 3 times a day. Work up to holding the stretch for 30 to 60 seconds.  Note: Your back should remain straight. To enhance the stretch over time, try to bend your knees lower. Or, raise your arm higher at the start of the stretch.  Frozen shoulder is another name for adhesive capsulitis. This causes restricted movement in the shoulder. If you have frozen shoulder, this stretch may cause mild pain, especially when you first get started. A few months may pass before you achieve the results you want. But once your shoulder heals, it rarely becomes frozen again. So stick to your stretching program. If you have any questions, ask your healthcare provider.  Date Last Reviewed: 2/1/2018 2000-2019 [employer]. [address] [place] [address]. All rights reserved. This information is not intended as a substitute for professional medical care. Always follow your healthcare professional's instructions.           Patient Education     Exercises for Shoulder Flexibility: Pendulum Exercise      Improving your flexibility can reduce pain. Stretching exercises also can help increase your range of pain-free motion. Breathe normally when you exercise. And try to use smooth, fluid movements.  Follow any special instructions you are given. If you feel pain, stop the exercise. If the pain continues after stopping, call your healthcare provider.  Here are the steps for the pendulum exercise:     Lean over with your good arm supported on a table or chair.    Relax the arm on the painful side, letting it hang straight down.    Slowly begin to swing the relaxed arm. Move it in a small [place], gradually making it bigger if you can. Then reverse the direction. Next, move it backward and forward. Finally, move it side to side.     Note: Spend about 5 minutes doing the exercise,  3 times a day. Change direction after 1 minute of motion.   Date Last Reviewed: 12/1/2017 2000-2019 [employer]. [address] [place] [address]. All rights reserved. This information is not intended as a substitute for professional medical care. Always follow your healthcare professional's instructions.           Patient Education     Shoulder Flexion (Strength)    1. Stand up straight with your feet slightly apart, and hold a hand weight in each hand. Your healthcare provider will tell you what size hand weights to use. Hold your arms down at your sides with your palms facing back, and the weights resting on the fronts of your thighs.  2. Look straight ahead. Slowly raise one arm straight up in front of you until the hand weight is in your line of sight. Keep your elbow straight but not locked. Hold for 1 second, then lower your arm. Repeat 5 times.  3. Repeat this exercise with the other arm.   4. Do this exercise 3 times a day, or as instructed.     Tip: Don t swing the weights. Use slow, controlled movements.   Date Last Reviewed: 5/1/2016 2000-2019 [employer]. [address] [place] [address]. All rights reserved. This information is not intended as a substitute for professional medical care. Always follow your healthcare professional's instructions.         Thank you so much or choosing [facility]  for your Health Care. It was a pleasure seeing you at your visit today! Please contact us with any questions or concerns you may have.                   Helga Ibarra MD                              To reach your [facility] care team after hours call:   [phone]    PLEASE NOTE OUR HOURS HAVE CHANGED secondary to COVID-19 coronavirus pandemic, as we are trying to minimize patient exposure to the virus,  which is now widespread in the [facility].  These hours may change with very little notice.  We  apologize for any inconvenience.       Our current clinic hours are:          Monday- Thursday   7:00am - 6:00pm  in person.      Friday  7:00am- 5:00pm                       Saturday and Sunday : Closed to in person and virtual visits        We have telephone and virtual visit times available between    7:00am - 6pm on Monday-Friday as well.                                                Phone:  [phone]      Our pharmacy hours:   Monday  9:00 am to 6:00 pm      Tuesday through Friday 9:00am to 5:00pm                        Saturday - 9:00 am to 12 noon       Sunday : Closed.              Phone:  [phone]    ###  Please note: at this time we are not accepting any walk-in visits. ###      There is also information available at our web site:  [email or web address]    If your provider ordered any lab tests and you do not receive the results within 10 business days, please call the clinic.    If you need a medication refill please contact your pharmacy.  Please allow 2 business days for your refill to be completed.    Our clinic offers telephone visits and e visits.  Please ask one of your team members to explain more.      Use [employer] (secure email communication and access to your chart) to send your primary care provider a message or make an appointment. Ask someone on your Team how to sign up for [employer].

## 2021-01-11 NOTE — PROGRESS NOTES
"SUBJECTIVE:   Jolynn Haji is a 65 year old female who presents for Preventive Visit and the following other medical problems:    1. Encounter for Medicare annual wellness exam    2. Encounter for routine adult health examination without abnormal findings    3. Rosacea - needs medication refill     4. Medication monitoring encounter    5. TIFFANY (generalized anxiety disorder)- well controlled currently - needs questionaire recheck & med refill     6. Adjustment disorder with mixed anxiety and depressed mood- mild at this time    7. Need for Tdap vaccination    8. Need for 23-polyvalent pneumococcal polysaccharide vaccine    9. Hyperlipidemia LDL goal <130- needs fasting labs     10. Essential hypertension with goal blood pressure less than 140/90 - well controlled currently - needs med refill     11. Rotator cuff syndrome, right- recent - needs stretches            Patient has been advised of split billing requirements and indicates understanding: Yes   Are you in the first 12 months of your Medicare coverage?  No    Healthy Habits:     In general, how would you rate your overall health?  Excellent    Frequency of exercise:  6-7 days/week    Duration of exercise:  30-45 minutes    Do you usually eat at least 4 servings of fruit and vegetables a day, include whole grains    & fiber and avoid regularly eating high fat or \"junk\" foods?  No    Taking medications regularly:  Yes    Medication side effects:  Lightheadedness    Ability to successfully perform activities of daily living:  No assistance needed    Home Safety:  No safety concerns identified    Hearing Impairment:  No hearing concerns    In the past 6 months, have you been bothered by leaking of urine? Yes    In general, how would you rate your overall mental or emotional health?  Excellent      PHQ-2 Total Score: 0    Additional concerns today:  No    Do you feel safe in your environment? Yes    Have you ever done Advance Care Planning? (For example, a Health " Directive, POLST, or a discussion with a medical provider or your loved ones about your wishes): No, advance care planning information given to patient to review.  Advanced care planning was discussed at today's visit.      Fall risk  Fallen 2 or more times in the past year?: No  Any fall with injury in the past year?: No    Cognitive Screening   1) Repeat 3 items (Leader, Season, Table)    2) Clock draw: NORMAL  3) 3 item recall: Recalls 3 objects  Results: 3 items recalled: COGNITIVE IMPAIRMENT LESS LIKELY    Mini-CogTM Copyright S Jorje. Licensed by the author for use in [facility]; reprinted with permission (aurelio@[facility]). All rights reserved.      Do you have sleep apnea, excessive snoring or daytime drowsiness?: no    Reviewed and updated as needed this visit by clinical staff  Tobacco  Allergies  Meds  Problems  Med Hx  Surg Hx  Fam Hx          Reviewed and updated as needed this visit by Provider    Meds             Social History     Tobacco Use     Smoking status: Never Smoker     Smokeless tobacco: Never Used   Substance Use Topics     Alcohol use: Yes     Alcohol/week: 0.0 - 1.0 standard drinks     Comment: 2 per month     If you drink alcohol do you typically have >3 drinks per day or >7 drinks per week? No    Alcohol Use 1/13/2021   Prescreen: >3 drinks/day or >7 drinks/week? -   Prescreen: >3 drinks/day or >7 drinks/week? No       Has been getting some headaches more since concussion about 3 years ago - feels better with walking dog in fresh air.     Getting a sore right shoulder - superior right - reaching behind her body to put her coat on or into the back seat to get something. No numbness, tingling, or weakness in upper or lower extremities. No bowel or bladder control problems.   Worse playing tennis with her dog and hitting him balls.  No falls     Hyperlipidemia Follow-Up- is fasting this am.       Are you regularly taking any medication or supplement to lower your  cholesterol?   No    Are you having muscle aches or other side effects that you think could be caused by your cholesterol lowering medication?  No     Recent Labs   Lab Test 12/11/19  1129 11/30/18  1048 11/04/14  0853 11/04/14  0853 10/28/13  0932   CHOL 205* 206*   < > 191 198   HDL 76 69   < > 70 68   * 122*   < > 106 115   TRIG 71 77   < > 77 73   CHOLHDLRATIO  --   --   --  2.7 2.9    < > = values in this interval not displayed.        Hypertension Follow-up:       Do you check your blood pressure regularly outside of the clinic? Yes - has been running 110's/70's at home the last 3-4 months.     Sometimes got low to the point when she would get lightheaded or dizzy.  Not bad as previous.      Are you following a low salt diet? No    Are your blood pressures ever more than 140 on the top number (systolic) OR more   than 90 on the bottom number (diastolic), for example 140/90? Yes  BP Readings from Last 3 Encounters:   01/14/21 123/75   01/13/21 126/82   01/07/21 114/73       Depression and Anxiety Follow-Up:     How are you doing with your depression since your last visit? No change    How are you doing with your anxiety since your last visit?  No change    Are you having other symptoms that might be associated with depression or anxiety? No    Have you had a significant life event? No     Do you have any concerns with your use of alcohol or other drugs? No    Social History     Tobacco Use     Smoking status: Never Smoker     Smokeless tobacco: Never Used   Substance Use Topics     Alcohol use: Yes     Alcohol/week: 0.0 - 1.0 standard drinks     Comment: 2 per month     Drug use: No     Comment: no herbal meds either     PHQ 10/25/2018 10/10/2019 1/13/2021   PHQ-9 Total Score 0 0 0   Q9: Thoughts of better off dead/self-harm past 2 weeks Not at all Not at all Not at all     TIFFANY-7 SCORE 10/10/2019 1/13/2021 1/13/2021   Total Score - - -   Total Score 0 0 0     Last PHQ-9 1/13/2021   1.  Little interest or  pleasure in doing things 0   2.  Feeling down, depressed, or hopeless 0   3.  Trouble falling or staying asleep, or sleeping too much 0   4.  Feeling tired or having little energy 0   5.  Poor appetite or overeating 0   6.  Feeling bad about yourself 0   7.  Trouble concentrating 0   8.  Moving slowly or restless 0   Q9: Thoughts of better off dead/self-harm past 2 weeks -   PHQ-9 Total Score -   Difficulty at work, home, or with people -     TIFFANY-7  1/13/2021   1. Feeling nervous, anxious, or on edge 0   2. Not being able to stop or control worrying 0   3. Worrying too much about different things 0   4. Trouble relaxing 0   5. Being so restless that it is hard to sit still 0   6. Becoming easily annoyed or irritable 0   7. Feeling afraid, as if something awful might happen 0   TIFFANY-7 Total Score 0   If you checked any problems, how difficult have they made it for you to do your work, take care of things at home, or get along with other people? Not difficult at all       Suicide Assessment Five-step Evaluation and Treatment (SAFE-T)    Current providers sharing in care for this patient include:   Patient Care Team:  Helga Ibarra MD as PCP - Kim Mittal APRN CNS as Nurse Practitioner (Clinical Nurse Specialist)  Jimmie De León MD as Assigned Musculoskeletal Provider  Sheldon Rogers MD as Assigned Surgical Provider  Patrick Melgar MD as Assigned PCP    The following health maintenance items are reviewed in Epic and correct as of today:  Health Maintenance   Topic Date Due     ANNUAL REVIEW OF HM ORDERS  1955     DEXA  10/05/2020     MAMMO SCREENING  10/22/2021     MEDICARE ANNUAL WELLNESS VISIT  01/13/2022     BMP  01/13/2022     MICROALBUMIN  01/13/2022     FALL RISK ASSESSMENT  01/13/2022     COLORECTAL CANCER SCREENING  05/01/2023     LIPID  01/13/2026     ADVANCE CARE PLANNING  01/13/2026     DTAP/TDAP/TD IMMUNIZATION (5 - Td) 01/13/2031     HEPATITIS C SCREENING   Completed     PHQ-2  Completed     INFLUENZA VACCINE  Completed     Pneumococcal Vaccine: 65+ Years  Completed     ZOSTER IMMUNIZATION  Completed     Pneumococcal Vaccine: Pediatrics (0 to 5 Years) and At-Risk Patients (6 to 64 Years)  Aged Out     IPV IMMUNIZATION  Aged Out     MENINGITIS IMMUNIZATION  Aged Out     HEPATITIS B IMMUNIZATION  Aged Out     Labs reviewed in EPIC  BP Readings from Last 3 Encounters:   01/14/21 123/75   01/13/21 126/82   01/07/21 114/73    Wt Readings from Last 3 Encounters:   01/13/21 70.8 kg (156 lb)   12/11/19 73 kg (161 lb)   10/08/19 72.1 kg (159 lb)                  Patient Active Problem List   Diagnosis     Essential hypertension with goal blood pressure less than 140/90     Hidradenitis     Localized osteoarthritis of hand     Synovial cyst of popliteal space     DERMATOPHYTOSIS OF NAIL- toenails      Lipidosis     Osteopenia     Postmenopausal     NUMBNESS AND TINGLING OF FOOT - bilateral -mild      Numbness of feet- distal feet R>L      Advanced directives, counseling/discussion     Stress incontinence, female - mild     Cough     Acute bronchitis- recurrent- ? related to mold in school building- pt has since retired and no further bronchitis     Rosacea     History of migraine headaches- assoc. with nausea/vomiting - resolved around menopause - were  premenstrual      Dupuytren's disease of palm - right 4th digit flexor tendon     Sun-damaged skin     Seasonal allergic rhinitis     Basal cell carcinoma of leg, right     Adjustment disorder with mixed anxiety and depressed mood- mild at this time     Family history of celiac disease     TIFFANY (generalized anxiety disorder)     Insomnia, unspecified type     Chronic constipation     Raynaud's phenomenon without gangrene     Other secondary osteoarthritis of left knee     Primary localized osteoarthrosis of left lower leg     Hyperlipidemia LDL goal <130     Basal cell carcinoma     Colon polyps     Screening for cervical cancer      Past Surgical History:   Procedure Laterality Date     COLONOSCOPY  2018    polyps, tubular - 2 mm - due 5 yrs     ZZC NONSPECIFIC PROCEDURE      Colposcopy       Social History     Tobacco Use     Smoking status: Never Smoker     Smokeless tobacco: Never Used   Substance Use Topics     Alcohol use: Yes     Alcohol/week: 0.0 - 1.0 standard drinks     Comment: 2 per month     Family History   Problem Relation Age of Onset     Thyroid Disease Mother         Graves disease     Hypertension Mother      Diabetes Mother         type 2     Blood Disease Mother          from leukemia at age 78     Diabetes Daughter         Juvenile Diabetes     Heart Disease Father 62        MV problem with CHF  at 62/ from MI     Hypertension Father      Diabetes Son         Juvenile Diabetes     Cancer Maternal Uncle         type ??     Cancer Maternal Uncle         liver         Current Outpatient Medications   Medication Sig Dispense Refill     clindamycin (CLINDAGEL) 1 % external gel Apply topically daily 60 g 11     escitalopram (LEXAPRO) 10 MG tablet Take 1 tablet (10 mg) by mouth daily 90 tablet 3     lisinopril (ZESTRIL) 5 MG tablet Take 1 tablet (5 mg) by mouth daily 90 tablet 3     Allergies   Allergen Reactions     Fosamax Diarrhea     Diarrhea, stomach cramps, jaw pain      Augmentin Itching and Rash     Recent Labs   Lab Test 21  0823 19  1129 18  1048 17  1604 17  1604   A1C  --   --   --   --  5.5   * 115* 122*   < >  --    HDL 75 76 69   < >  --    TRIG 65 71 77   < >  --    ALT 17 20 23   < > 22   CR 0.68 0.70 0.82   < > 0.61   GFRESTIMATED >90 90 70   < > >90  Non  GFR Calc     GFRESTBLACK >90 >90 85   < > >90   GFR Calc     POTASSIUM 4.1 4.8 5.1   < > 3.9   TSH 2.07 0.96 1.42   < >  --     < > = values in this interval not displayed.      Pneumonia Vaccine:Adults age 65+ who have not received previous Pneumovax (PPSV23) or  "PCV13 as an adult: Should first be given PCV13 AND then should be given PPSV23 6-12 months after PCV13  Mammogram Screening: Mammogram Screening: Patient over age 50, mutual decision to screen reflected in health maintenance.  History of abnormal Pap smear: NO - age 65 - see link Cervical Cytology Screening Guidelines    Review of Systems  Constitutional, HEENT, cardiovascular, pulmonary, gi and gu systems are negative, except as otherwise noted.    OBJECTIVE:   /82   Pulse 89   Temp 97  F (36.1  C)   Ht 1.721 m (5' 7.75\")   Wt 70.8 kg (156 lb)   LMP 06/28/2001   SpO2 98%   BMI 23.90 kg/m   Estimated body mass index is 23.9 kg/m  as calculated from the following:    Height as of this encounter: 1.721 m (5' 7.75\").    Weight as of this encounter: 70.8 kg (156 lb).  Physical Exam  GENERAL APPEARANCE: healthy, alert and no distress  EYES: Eyes grossly normal to inspection, PERRL and conjunctivae and sclerae normal  HENT: ear canals and TM's normal, nose and mouth without ulcers or lesions, oropharynx clear and oral mucous membranes moist  NECK: no adenopathy, no asymmetry, masses, or scars and thyroid normal to palpation  RESP: lungs clear to auscultation - no rales, rhonchi or wheezes  BREAST: normal without masses, tenderness or nipple discharge and no palpable axillary masses or adenopathy  CV: regular rate and rhythm, normal S1 S2, no S3 or S4, no murmur, click or rub, no peripheral edema and peripheral pulses strong  ABDOMEN: soft, nontender, no hepatosplenomegaly, no masses and bowel sounds normal  MS: no musculoskeletal defects are noted and gait is age appropriate without ataxia  SKIN: no suspicious lesions or rashes  NEURO: Normal strength and tone, sensory exam grossly normal, mentation intact and speech normal  PSYCH: mentation appears normal and affect normal/bright    Diagnostic Test Results:  Labs reviewed in Epic    ASSESSMENT / PLAN:       ICD-10-CM    1. Encounter for Medicare annual " wellness exam  Z00.00    2. Encounter for routine adult health examination without abnormal findings  Z00.00    3. Rosacea - needs medication refill   L71.9 clindamycin (CLINDAGEL) 1 % external gel     OFFICE/OUTPT VISIT,EST,LEVL IV   4. Medication monitoring encounter  Z51.81 escitalopram (LEXAPRO) 10 MG tablet     OFFICE/OUTPT VISIT,EST,LEVL IV   5. TIFFANY (generalized anxiety disorder)- well controlled currently - needs questionaire recheck & med refill   F41.1 escitalopram (LEXAPRO) 10 MG tablet     OFFICE/OUTPT VISIT,EST,LEVL IV   6. Adjustment disorder with mixed anxiety and depressed mood- mild at this time  F43.23 escitalopram (LEXAPRO) 10 MG tablet     OFFICE/OUTPT VISIT,EST,LEVL IV   7. Need for Tdap vaccination  Z23 TDAP VACCINE (Adacel, Boostrix)  [[id number]]     EA ADD'L VACCINE   8. Need for 23-polyvalent pneumococcal polysaccharide vaccine  Z23 Pneumococcal vaccine 23 valent PPSV23  (Pneumovax) [12342]     ADMIN MEDICARE: Pneumococcal Vaccine ()   9. Hyperlipidemia LDL goal <130- needs fasting labs   E78.5 Albumin Random Urine Quantitative with Creat Ratio     Comprehensive metabolic panel     Lipid panel reflex to direct LDL Fasting     OFFICE/OUTPT VISIT,EST,LEVL IV   10. Essential hypertension with goal blood pressure less than 140/90 - well controlled currently - needs med refill   I10 Albumin Random Urine Quantitative with Creat Ratio     CBC with platelets     Comprehensive metabolic panel     TSH with free T4 reflex     lisinopril (ZESTRIL) 5 MG tablet     OFFICE/OUTPT VISIT,EST,LEVL IV   11. Rotator cuff syndrome, right- recent - needs stretches   M75.101 OFFICE/OUTPT VISIT,EST,LEVL IV       Patient has been advised of split billing requirements and indicates understanding: Yes  COUNSELING:  Reviewed preventive health counseling, as reflected in patient instructions    Estimated body mass index is 23.9 kg/m  as calculated from the following:    Height as of this encounter: 1.721 m (5'  "7.75\").    Weight as of this encounter: 70.8 kg (156 lb).        She reports that she has never smoked. She has never used smokeless tobacco.      Appropriate preventive services were discussed with this patient, including applicable screening as appropriate for cardiovascular disease, diabetes, osteopenia/osteoporosis, and glaucoma.  As appropriate for age/gender, discussed screening for colorectal cancer, prostate cancer, breast cancer, and cervical cancer. Checklist reviewing preventive services available has been given to the patient.    Reviewed patients plan of care and provided an AVS. The Intermediate Care Plan ( asthma action plan, low back pain action plan, and migraine action plan) for Jolynn meets the Care Plan requirement. This Care Plan has been established and reviewed with the Patient.    Counseling Resources:  ATP IV Guidelines  Pooled Cohorts Equation Calculator  Breast Cancer Risk Calculator  Breast Cancer: Medication to Reduce Risk  FRAX Risk Assessment  ICSI Preventive Guidelines  Dietary Guidelines for Americans, 2010  USDA's MyPlate  ASA Prophylaxis  Lung CA Screening    Helga Ibarra MD  [facility]    Identified Health Risks:  "

## 2021-01-13 ENCOUNTER — OFFICE VISIT (OUTPATIENT)
Dept: FAMILY MEDICINE | Facility: CLINIC | Age: 66
End: 2021-01-13
Payer: MEDICARE

## 2021-01-13 VITALS
HEIGHT: 68 IN | HEART RATE: 89 BPM | SYSTOLIC BLOOD PRESSURE: 126 MMHG | BODY MASS INDEX: 23.64 KG/M2 | TEMPERATURE: 97 F | DIASTOLIC BLOOD PRESSURE: 82 MMHG | WEIGHT: 156 LBS | OXYGEN SATURATION: 98 %

## 2021-01-13 DIAGNOSIS — E78.5 HYPERLIPIDEMIA LDL GOAL <130: ICD-10-CM

## 2021-01-13 DIAGNOSIS — Z00.00 ENCOUNTER FOR ROUTINE ADULT HEALTH EXAMINATION WITHOUT ABNORMAL FINDINGS: ICD-10-CM

## 2021-01-13 DIAGNOSIS — M75.101 ROTATOR CUFF SYNDROME, RIGHT: ICD-10-CM

## 2021-01-13 DIAGNOSIS — Z51.81 MEDICATION MONITORING ENCOUNTER: ICD-10-CM

## 2021-01-13 DIAGNOSIS — Z00.00 ENCOUNTER FOR MEDICARE ANNUAL WELLNESS EXAM: Primary | ICD-10-CM

## 2021-01-13 DIAGNOSIS — Z23 NEED FOR TDAP VACCINATION: ICD-10-CM

## 2021-01-13 DIAGNOSIS — L71.9 ROSACEA: ICD-10-CM

## 2021-01-13 DIAGNOSIS — Z23 NEED FOR 23-POLYVALENT PNEUMOCOCCAL POLYSACCHARIDE VACCINE: ICD-10-CM

## 2021-01-13 DIAGNOSIS — I10 ESSENTIAL HYPERTENSION WITH GOAL BLOOD PRESSURE LESS THAN 140/90: ICD-10-CM

## 2021-01-13 DIAGNOSIS — F43.23 ADJUSTMENT DISORDER WITH MIXED ANXIETY AND DEPRESSED MOOD: ICD-10-CM

## 2021-01-13 DIAGNOSIS — F41.1 GAD (GENERALIZED ANXIETY DISORDER): ICD-10-CM

## 2021-01-13 LAB
ALBUMIN SERPL-MCNC: 3.8 G/DL (ref 3.4–5)
ALP SERPL-CCNC: 72 U/L (ref 40–150)
ALT SERPL W P-5'-P-CCNC: 17 U/L (ref 0–50)
ANION GAP SERPL CALCULATED.3IONS-SCNC: 4 MMOL/L (ref 3–14)
AST SERPL W P-5'-P-CCNC: 13 U/L (ref 0–45)
BILIRUB SERPL-MCNC: 0.8 MG/DL (ref 0.2–1.3)
BUN SERPL-MCNC: 18 MG/DL (ref 7–30)
CALCIUM SERPL-MCNC: 8.8 MG/DL (ref 8.5–10.1)
CHLORIDE SERPL-SCNC: 108 MMOL/L (ref 94–109)
CHOLEST SERPL-MCNC: 211 MG/DL
CO2 SERPL-SCNC: 28 MMOL/L (ref 20–32)
CREAT SERPL-MCNC: 0.68 MG/DL (ref 0.52–1.04)
CREAT UR-MCNC: 49 MG/DL
ERYTHROCYTE [DISTWIDTH] IN BLOOD BY AUTOMATED COUNT: 13.1 % (ref 10–15)
GFR SERPL CREATININE-BSD FRML MDRD: >90 ML/MIN/{1.73_M2}
GLUCOSE SERPL-MCNC: 91 MG/DL (ref 70–99)
HCT VFR BLD AUTO: 41.7 % (ref 35–47)
HDLC SERPL-MCNC: 75 MG/DL
HGB BLD-MCNC: 13.3 G/DL (ref 11.7–15.7)
LDLC SERPL CALC-MCNC: 123 MG/DL
MCH RBC QN AUTO: 29.3 PG (ref 26.5–33)
MCHC RBC AUTO-ENTMCNC: 31.9 G/DL (ref 31.5–36.5)
MCV RBC AUTO: 92 FL (ref 78–100)
MICROALBUMIN UR-MCNC: <5 MG/L
MICROALBUMIN/CREAT UR: NORMAL MG/G CR (ref 0–25)
NONHDLC SERPL-MCNC: 136 MG/DL
PLATELET # BLD AUTO: 219 10E9/L (ref 150–450)
POTASSIUM SERPL-SCNC: 4.1 MMOL/L (ref 3.4–5.3)
PROT SERPL-MCNC: 7.1 G/DL (ref 6.8–8.8)
RBC # BLD AUTO: 4.54 10E12/L (ref 3.8–5.2)
SODIUM SERPL-SCNC: 140 MMOL/L (ref 133–144)
TRIGL SERPL-MCNC: 65 MG/DL
TSH SERPL DL<=0.005 MIU/L-ACNC: 2.07 MU/L (ref 0.4–4)
WBC # BLD AUTO: 6.4 10E9/L (ref 4–11)

## 2021-01-13 PROCEDURE — 80061 LIPID PANEL: CPT | Performed by: FAMILY MEDICINE

## 2021-01-13 PROCEDURE — 80053 COMPREHEN METABOLIC PANEL: CPT | Performed by: FAMILY MEDICINE

## 2021-01-13 PROCEDURE — G0438 PPPS, INITIAL VISIT: HCPCS | Performed by: FAMILY MEDICINE

## 2021-01-13 PROCEDURE — G0009 ADMIN PNEUMOCOCCAL VACCINE: HCPCS | Mod: 59 | Performed by: FAMILY MEDICINE

## 2021-01-13 PROCEDURE — 84443 ASSAY THYROID STIM HORMONE: CPT | Performed by: FAMILY MEDICINE

## 2021-01-13 PROCEDURE — 85027 COMPLETE CBC AUTOMATED: CPT | Performed by: FAMILY MEDICINE

## 2021-01-13 PROCEDURE — 36415 COLL VENOUS BLD VENIPUNCTURE: CPT | Performed by: FAMILY MEDICINE

## 2021-01-13 PROCEDURE — 90732 PPSV23 VACC 2 YRS+ SUBQ/IM: CPT | Performed by: FAMILY MEDICINE

## 2021-01-13 PROCEDURE — 90471 IMMUNIZATION ADMIN: CPT | Performed by: FAMILY MEDICINE

## 2021-01-13 PROCEDURE — 82043 UR ALBUMIN QUANTITATIVE: CPT | Performed by: FAMILY MEDICINE

## 2021-01-13 PROCEDURE — 99214 OFFICE O/P EST MOD 30 MIN: CPT | Mod: 25 | Performed by: FAMILY MEDICINE

## 2021-01-13 PROCEDURE — 90715 TDAP VACCINE 7 YRS/> IM: CPT | Performed by: FAMILY MEDICINE

## 2021-01-13 RX ORDER — CLINDAMYCIN PHOSPHATE 10 MG/G
GEL TOPICAL DAILY
Qty: 60 G | Refills: 11 | Status: SHIPPED | OUTPATIENT
Start: 2021-01-13 | End: 2022-01-31

## 2021-01-13 RX ORDER — LISINOPRIL 5 MG/1
5 TABLET ORAL DAILY
Qty: 90 TABLET | Refills: 3 | Status: SHIPPED | OUTPATIENT
Start: 2021-01-13 | End: 2021-05-11

## 2021-01-13 RX ORDER — ESCITALOPRAM OXALATE 10 MG/1
10 TABLET ORAL DAILY
Qty: 90 TABLET | Refills: 3 | Status: SHIPPED | OUTPATIENT
Start: 2021-01-13 | End: 2022-01-12

## 2021-01-13 ASSESSMENT — ANXIETY QUESTIONNAIRES
5. BEING SO RESTLESS THAT IT IS HARD TO SIT STILL: NOT AT ALL
3. WORRYING TOO MUCH ABOUT DIFFERENT THINGS: NOT AT ALL
6. BECOMING EASILY ANNOYED OR IRRITABLE: NOT AT ALL
5. BEING SO RESTLESS THAT IT IS HARD TO SIT STILL: NOT AT ALL
2. NOT BEING ABLE TO STOP OR CONTROL WORRYING: NOT AT ALL
1. FEELING NERVOUS, ANXIOUS, OR ON EDGE: NOT AT ALL
6. BECOMING EASILY ANNOYED OR IRRITABLE: NOT AT ALL
GAD7 TOTAL SCORE: 0
GAD7 TOTAL SCORE: 0
IF YOU CHECKED OFF ANY PROBLEMS ON THIS QUESTIONNAIRE, HOW DIFFICULT HAVE THESE PROBLEMS MADE IT FOR YOU TO DO YOUR WORK, TAKE CARE OF THINGS AT HOME, OR GET ALONG WITH OTHER PEOPLE: NOT DIFFICULT AT ALL
1. FEELING NERVOUS, ANXIOUS, OR ON EDGE: NOT AT ALL
2. NOT BEING ABLE TO STOP OR CONTROL WORRYING: NOT AT ALL
IF YOU CHECKED OFF ANY PROBLEMS ON THIS QUESTIONNAIRE, HOW DIFFICULT HAVE THESE PROBLEMS MADE IT FOR YOU TO DO YOUR WORK, TAKE CARE OF THINGS AT HOME, OR GET ALONG WITH OTHER PEOPLE: NOT DIFFICULT AT ALL
3. WORRYING TOO MUCH ABOUT DIFFERENT THINGS: NOT AT ALL
7. FEELING AFRAID AS IF SOMETHING AWFUL MIGHT HAPPEN: NOT AT ALL
7. FEELING AFRAID AS IF SOMETHING AWFUL MIGHT HAPPEN: NOT AT ALL

## 2021-01-13 ASSESSMENT — PATIENT HEALTH QUESTIONNAIRE - PHQ9
SUM OF ALL RESPONSES TO PHQ QUESTIONS 1-9: 0
5. POOR APPETITE OR OVEREATING: NOT AT ALL
5. POOR APPETITE OR OVEREATING: NOT AT ALL

## 2021-01-13 ASSESSMENT — MIFFLIN-ST. JEOR: SCORE: 1292.14

## 2021-01-14 ENCOUNTER — OFFICE VISIT (OUTPATIENT)
Dept: DERMATOLOGY | Facility: CLINIC | Age: 66
End: 2021-01-14
Payer: MEDICARE

## 2021-01-14 VITALS — OXYGEN SATURATION: 99 % | SYSTOLIC BLOOD PRESSURE: 123 MMHG | HEART RATE: 73 BPM | DIASTOLIC BLOOD PRESSURE: 75 MMHG

## 2021-01-14 DIAGNOSIS — D04.62 SQUAMOUS CELL CARCINOMA IN SITU (SCCIS) OF SKIN OF LEFT ELBOW: ICD-10-CM

## 2021-01-14 DIAGNOSIS — C44.519 BASAL CELL CARCINOMA (BCC) OF BACK: Primary | ICD-10-CM

## 2021-01-14 PROCEDURE — 11602 EXC TR-EXT MAL+MARG 1.1-2 CM: CPT | Mod: 59 | Performed by: DERMATOLOGY

## 2021-01-14 PROCEDURE — 17313 MOHS 1 STAGE T/A/L: CPT | Performed by: DERMATOLOGY

## 2021-01-14 ASSESSMENT — ANXIETY QUESTIONNAIRES: GAD7 TOTAL SCORE: 0

## 2021-01-14 NOTE — PATIENT INSTRUCTIONS
Open Wound Care     for  left elbow and left scapula (upper back)    ? No strenuous activity for 48 hours    ? Take Tylenol as needed for discomfort.                                                .         ? Do not drink alcoholic beverages for 48 hours.    ? Keep the pressure bandage in place for 24 hours. If the bandage becomes blood tinged or loose, reinforce it with gauze and tape.        (Refer to the reverse side of this page for management of bleeding).    ? Remove bandage in 24 hours and begin wound care as follows:     1. Clean area with tap water using a Q tip or gauze pad, (shower / bathe normally)  2. Dry wound with Q tip or gauze pad  3. Apply Aquaphor, Vaseline, Polysporin or Bacitracin Ointment with a Q tip    Do NOT use Neosporin Ointment   4. Cover the wound with a band-aid or nonstick gauze pad and paper tape.  5. Repeat wound care once a day until wound is completely healed.    It is an old wives tale that a wound heals better when it is exposed to air and allowed to dry out. The wound will heal faster with a better cosmetic result if it is kept moist with ointment and covered with a bandage.  Do not let the wound dry out.    Supplies Needed:                Qtips or gauze pads                Vaseline, Aquaphor, Polysporin Ointment or Bacitracin Ointment (NOT NEOSPORIN)                Bandaids or nonstick gauze pads and paper tape    Wound care kits and brown paper tape are available for purchase at   the pharmacy.    BLEEDIN. Use tightly rolled up gauze or cloth to apply direct pressure over the bandage for 20   minutes.  2. Reapply pressure for an additional 20 minutes if necessary  3. Call the office or go to the nearest emergency room if pressure fails to stop the bleeding.  4. Use additional gauze and tape to maintain pressure once the bleeding has stopped.  5. Begin wound care 24 hours after surgery as directed.                WOUND HEALING    1. One week after surgery a pink / red  halo will form around the outside of the wound.   This is new skin.  2. The center of the wound will appear yellowish white and produce some drainage.  3. The pink halo will slowly migrate in toward the center of the wound until the wound is covered with new shiny pink skin.  4. There will be no more drainage when the wound is completely healed.  5. It will take six months to one year for the redness to fade.  6. The scar may be itchy, tight and sensitive to extreme temperatures for a year after the surgery.  7. Massaging the area several times a day for several minutes after the wound is completely healed will help the scar soften and normalize faster. Begin massage only after healing is complete.    In case of emergency call: Dr Rogers: [phone]     [facility]: [phone]    [facility]: [phone]

## 2021-01-14 NOTE — LETTER
2021         RE: Jolynn Haji  [address] [place] [address]        Dear Colleague,    Thank you for referring your patient, Jolynn Haji, to the [facility]. Please see a copy of my visit note below.    Surgical Office Location:  [facility]  [address] [place] [address]      Jolynn Haji is an extremely pleasant 65 year old year old female patient here today for evaluation and managment of squamous cell carcinoma in situ and basal cell carcinoma.  PRevious sites healing well.  Patient has no other skin complaints today.  Remainder of the HPI, Meds, PMH, Allergies, FH, and SH was reviewed in chart.      Past Medical History:   Diagnosis Date     Abnormal glandular Papanicolaou smear of cervix- ASCUS in 2003    [facility] H-      Basal cell carcinoma of leg, right 2016    Dr Rogers     Colon polyps 2018    tubular adenoam - due 5 yrs - 2 mm     Concussion without loss of consciousness 2017     Hidradenitis     left  groin     HSIL (high grade squamous intraepithelial lesion) on Pap smear of cervix 2000    Normal paps from  to /     Hypertension goal BP (blood pressure) < 140/90      OA (osteoarthritis) of knee     moderate     Postmenopausal since 2008      Shingles 2010    left      Squamous cell carcinoma of skin, unspecified      Synovial cyst of popliteal space        Past Surgical History:   Procedure Laterality Date     COLONOSCOPY  2018    polyps, tubular - 2 mm - due 5 yrs     ZZC NONSPECIFIC PROCEDURE      Colposcopy        Family History   Problem Relation Age of Onset     Thyroid Disease Mother         Graves disease     Hypertension Mother      Diabetes Mother         type 2     Blood Disease Mother          from leukemia at age 78     Diabetes Daughter         Juvenile Diabetes     Heart Disease Father 62        MV problem with CHF  at 62/ from MI      Hypertension Father      Diabetes Son         Juvenile Diabetes     Cancer Maternal Uncle         type ??     Cancer Maternal Uncle         liver       Social History     Socioeconomic History     Marital status:      Spouse name: Zach     Number of children: 2     Years of education: 16     Highest education level: Not on file   Occupational History     Occupation: teacher first grade -retired at age 60     Comment: [phone]     Employer: [employer]   Social Needs     Financial resource strain: Not on file     Food insecurity     Worry: Not on file     Inability: Not on file     Transportation needs     Medical: Not on file     Non-medical: Not on file   Tobacco Use     Smoking status: Never Smoker     Smokeless tobacco: Never Used   Substance and Sexual Activity     Alcohol use: Yes     Alcohol/week: 0.0 - 1.0 standard drinks     Comment: 2 per month     Drug use: No     Comment: no herbal meds either     Sexual activity: Yes     Partners: Male     Birth control/protection: Surgical     Comment: -was on DepoProvera since 2000-2/2008 -  had vasectomy   Lifestyle     Physical activity     Days per week: Not on file     Minutes per session: Not on file     Stress: Not on file   Relationships     Social connections     Talks on phone: Not on file     Gets together: Not on file     Attends Quaker service: Not on file     Active member of club or organization: Not on file     Attends meetings of clubs or organizations: Not on file     Relationship status: Not on file     Intimate partner violence     Fear of current or ex partner: Not on file     Emotionally abused: Not on file     Physically abused: Not on file     Forced sexual activity: Not on file   Other Topics Concern      Service No     Blood Transfusions No     Caffeine Concern No     Occupational Exposure No     Hobby Hazards No     Sleep Concern No     Stress Concern No     Weight Concern No      Special Diet No     Back Care No     Exercise Yes     Comment: regular      Bike Helmet No     Seat Belt Yes     Comment: always     Self-Exams Yes     Comment: SBE  encouraged monthly     Parent/sibling w/ CABG, MI or angioplasty before 65F 55M? No   Social History Narrative    [place] Bryson - going to be 6 yrs old spring 2021---needs walks every day. --Helga Ibarra MD      January 13, 2021        Outpatient Encounter Medications as of 1/14/2021   Medication Sig Dispense Refill     clindamycin (CLINDAGEL) 1 % external gel Apply topically daily 60 g 11     escitalopram (LEXAPRO) 10 MG tablet Take 1 tablet (10 mg) by mouth daily 90 tablet 3     lisinopril (ZESTRIL) 5 MG tablet Take 1 tablet (5 mg) by mouth daily 90 tablet 3     No facility-administered encounter medications on file as of 1/14/2021.              Review Of Systems  Skin: As above  Eyes: negative  Ears/Nose/Throat: negative  Respiratory: No shortness of breath, dyspnea on exertion, cough, or hemoptysis  Cardiovascular: negative  Gastrointestinal: negative  Genitourinary: negative  Musculoskeletal: negative  Neurologic: negative  Psychiatric: negative  Hematologic/Lymphatic/Immunologic: negative  Endocrine: negative      O:   NAD, WDWN, Alert & Oriented, Mood & Affect wnl, Vitals stable   Here today alone   /75   Pulse 73   LMP 06/28/2001   SpO2 99%   Breastfeeding No    General appearance normal   Vitals stable   Alert, oriented and in no acute distress      Following lymph nodes palpated: no antcuital    L elbow ill-deifned 1cm red scaly plaque    L scapula 5mm red macule       Eyes: Conjunctivae/lids:Normal     ENT: Lips, buccal mucosa, tongue: normal    MSK:Normal    Cardiovascular: peripheral edema none    Pulm: Breathing Normal    Neuro/Psych: Orientation:Alert and Orientedx3 ; Mood/Affect:normal       MICRO:   L scapula:Unremarkable epidermis with parallel bundles of cellular collagen within the superficial  dermis.  No concerning areas for malignancy.     A/P:  1. 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  The risks and benefits as well as alternatives to therapy were discussed, in detail.  Specifically, the risks of infection, scarring, bleeding, prolonged wound healing, incomplete removal, allergy to anesthesia, nerve injury and recurrence were addressed.  Indication for Mohs was Size, Location and Ill-defined margins. Prior to the procedure, the treatment site was clearly identified and, if available, confirmed with previous photos and confirmed by the patient   All components of the Universal Protocol/PAUSE rule were completed.  The Mohs surgeon operated in two distinct and integrated capacities as the surgeon and pathologist.      The area was prepped with Betasept.  A rim of normal appearing skin was marked circumferentially around the lesion.  The area was infiltrated with local anesthesia.  The tumor was first debulked to remove all clinically apparent tumor.  An incision following the standard Mohs approach was done and the specimen was oriented,mapped and placed in 1 block(s).  Each specimen was then chromacoded and processed in the Mohs laboratory using standard Mohs technique and submitted for frozen section histology.  Frozen section analysis showed no residual tumor but CLEAR MARGINS.      The tumor was excised using standard Mohs technique in 1 stages(s).  CLEAR MARGINS OBTAINED and Final defect size was 1.6 x 1.4 cm.     We discussed the options for wound management in full with the patient including risks/benefits/ possible outcomes.        REPAIR SECOND INTENT: We discussed the options for wound management in full with the patient including risks/benefits/possible outcomes. Decision made to allow the wound to heal by second intention. EBL minimal; complications  none; wound care routine.  The patient was discharged in good condition and will return in one month or prn for wound evaluation.    2. L scapula basal cell carcinoma   EXCISION OF BASAL CELL CARCINOMA, Margins confirmed with FROZEN SECTIONS AND Second intent: After thorough discussion of PGACAC, consent obtained, anesthesia and prep, the margins of the lesion were identified and an elliptical incision was made encompassing the lesion with 4mm margin. The incisions were made through the skin and down to and including the superficial dermis.  The lesion was removed en bloc and submitted for frozen section pathologic review. Clear margins obtained (1.1cm).    REPAIR SECOND INTENT: We discussed the options for wound management in full with the patient including risks/benefits/possible outcomes. Decision made to allow the wound to heal by second intention. EBL minimal; complications none; wound care routine.  The patient was discharged in good condition and will return in one month or prn for wound evaluation.       It was a pleasure speaking to Jolynn Haji today.  Signs and Symptoms of skin cancer discussed with patient.  Patient encouraged to perform monthly skin exams.  UV precautions reviewed with patient.  Risks of non-melanoma skin cancer discussed with patient   Return to clinic 6 months        Again, thank you for allowing me to participate in the care of your patient.        Sincerely,        Sheldon Rogers MD

## 2021-01-14 NOTE — PROGRESS NOTES
Surgical Office Location:  [facility]  [address] [place] [id number]

## 2021-01-14 NOTE — PROGRESS NOTES
Jolynn Haji is an extremely pleasant 65 year old year old female patient here today for evaluation and managment of squamous cell carcinoma in situ and basal cell carcinoma.  PRevious sites healing well.  Patient has no other skin complaints today.  Remainder of the HPI, Meds, PMH, Allergies, FH, and SH was reviewed in chart.      Past Medical History:   Diagnosis Date     Abnormal glandular Papanicolaou smear of cervix- ASCUS in 2003    [facility] H-      Basal cell carcinoma of leg, right 2016    Dr Rogers     Colon polyps 2018    tubular adenoam - due 5 yrs - 2 mm     Concussion without loss of consciousness 2017     Hidradenitis     left  groin     HSIL (high grade squamous intraepithelial lesion) on Pap smear of cervix 2000    Normal paps from  to /     Hypertension goal BP (blood pressure) < 140/90      OA (osteoarthritis) of knee     moderate     Postmenopausal since 2008      Shingles 2010    left      Squamous cell carcinoma of skin, unspecified      Synovial cyst of popliteal space        Past Surgical History:   Procedure Laterality Date     COLONOSCOPY  2018    polyps, tubular - 2 mm - due 5 yrs     ZZC NONSPECIFIC PROCEDURE      Colposcopy        Family History   Problem Relation Age of Onset     Thyroid Disease Mother         Graves disease     Hypertension Mother      Diabetes Mother         type 2     Blood Disease Mother          from leukemia at age 78     Diabetes Daughter         Juvenile Diabetes     Heart Disease Father 62        MV problem with CHF  at 62/ from MI     Hypertension Father      Diabetes Son         Juvenile Diabetes     Cancer Maternal Uncle         type ??     Cancer Maternal Uncle         liver       Social History     Socioeconomic History     Marital status:      Spouse name: Zach     Number of children: 2     Years of education: 16     Highest education level: Not on file   Occupational History      Occupation: teacher first grade -retired at age 60     Comment: [phone]     Employer: [employer]   Social Needs     Financial resource strain: Not on file     Food insecurity     Worry: Not on file     Inability: Not on file     Transportation needs     Medical: Not on file     Non-medical: Not on file   Tobacco Use     Smoking status: Never Smoker     Smokeless tobacco: Never Used   Substance and Sexual Activity     Alcohol use: Yes     Alcohol/week: 0.0 - 1.0 standard drinks     Comment: 2 per month     Drug use: No     Comment: no herbal meds either     Sexual activity: Yes     Partners: Male     Birth control/protection: Surgical     Comment: -was on DepoProvera since 2000-2/2008 -  had vasectomy   Lifestyle     Physical activity     Days per week: Not on file     Minutes per session: Not on file     Stress: Not on file   Relationships     Social connections     Talks on phone: Not on file     Gets together: Not on file     Attends Episcopalian service: Not on file     Active member of club or organization: Not on file     Attends meetings of clubs or organizations: Not on file     Relationship status: Not on file     Intimate partner violence     Fear of current or ex partner: Not on file     Emotionally abused: Not on file     Physically abused: Not on file     Forced sexual activity: Not on file   Other Topics Concern      Service No     Blood Transfusions No     Caffeine Concern No     Occupational Exposure No     Hobby Hazards No     Sleep Concern No     Stress Concern No     Weight Concern No     Special Diet No     Back Care No     Exercise Yes     Comment: regular      Bike Helmet No     Seat Belt Yes     Comment: always     Self-Exams Yes     Comment: SBE  encouraged monthly     Parent/sibling w/ CABG, MI or angioplasty before 65F 55M? No   Social History Narrative    [place] - [place] [employer] Spaniel - going to be 6 yrs old spring 2021---needs walks  every day. --Helga Ibarra MD      January 13, 2021        Outpatient Encounter Medications as of 1/14/2021   Medication Sig Dispense Refill     clindamycin (CLINDAGEL) 1 % external gel Apply topically daily 60 g 11     escitalopram (LEXAPRO) 10 MG tablet Take 1 tablet (10 mg) by mouth daily 90 tablet 3     lisinopril (ZESTRIL) 5 MG tablet Take 1 tablet (5 mg) by mouth daily 90 tablet 3     No facility-administered encounter medications on file as of 1/14/2021.              Review Of Systems  Skin: As above  Eyes: negative  Ears/Nose/Throat: negative  Respiratory: No shortness of breath, dyspnea on exertion, cough, or hemoptysis  Cardiovascular: negative  Gastrointestinal: negative  Genitourinary: negative  Musculoskeletal: negative  Neurologic: negative  Psychiatric: negative  Hematologic/Lymphatic/Immunologic: negative  Endocrine: negative      O:   NAD, WDWN, Alert & Oriented, Mood & Affect wnl, Vitals stable   Here today alone   /75   Pulse 73   LMP 06/28/2001   SpO2 99%   Breastfeeding No    General appearance normal   Vitals stable   Alert, oriented and in no acute distress      Following lymph nodes palpated: no antcuital    L elbow ill-deifned 1cm red scaly plaque    L scapula 5mm red macule       Eyes: Conjunctivae/lids:Normal     ENT: Lips, buccal mucosa, tongue: normal    MSK:Normal    Cardiovascular: peripheral edema none    Pulm: Breathing Normal    Neuro/Psych: Orientation:Alert and Orientedx3 ; Mood/Affect:normal       MICRO:   L scapula:Unremarkable epidermis with parallel bundles of cellular collagen within the superficial dermis.  No concerning areas for malignancy.     A/P:  1. 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  The risks and benefits as well as alternatives to therapy were discussed, in detail.  Specifically, the risks of infection,  scarring, bleeding, prolonged wound healing, incomplete removal, allergy to anesthesia, nerve injury and recurrence were addressed.  Indication for Mohs was Size, Location and Ill-defined margins. Prior to the procedure, the treatment site was clearly identified and, if available, confirmed with previous photos and confirmed by the patient   All components of the Universal Protocol/PAUSE rule were completed.  The Mohs surgeon operated in two distinct and integrated capacities as the surgeon and pathologist.      The area was prepped with Betasept.  A rim of normal appearing skin was marked circumferentially around the lesion.  The area was infiltrated with local anesthesia.  The tumor was first debulked to remove all clinically apparent tumor.  An incision following the standard Mohs approach was done and the specimen was oriented,mapped and placed in 1 block(s).  Each specimen was then chromacoded and processed in the Mohs laboratory using standard Mohs technique and submitted for frozen section histology.  Frozen section analysis showed no residual tumor but CLEAR MARGINS.      The tumor was excised using standard Mohs technique in 1 stages(s).  CLEAR MARGINS OBTAINED and Final defect size was 1.6 x 1.4 cm.     We discussed the options for wound management in full with the patient including risks/benefits/ possible outcomes.        REPAIR SECOND INTENT: We discussed the options for wound management in full with the patient including risks/benefits/possible outcomes. Decision made to allow the wound to heal by second intention. EBL minimal; complications none; wound care routine.  The patient was discharged in good condition and will return in one month or prn for wound evaluation.    2. L scapula basal cell carcinoma   EXCISION OF BASAL CELL CARCINOMA, Margins confirmed with FROZEN SECTIONS AND Second intent: After thorough discussion of PGACAC, consent obtained, anesthesia and prep, the margins of the lesion were  identified and an elliptical incision was made encompassing the lesion with 4mm margin. The incisions were made through the skin and down to and including the superficial dermis.  The lesion was removed en bloc and submitted for frozen section pathologic review. Clear margins obtained (1.1cm).    REPAIR SECOND INTENT: We discussed the options for wound management in full with the patient including risks/benefits/possible outcomes. Decision made to allow the wound to heal by second intention. EBL minimal; complications none; wound care routine.  The patient was discharged in good condition and will return in one month or prn for wound evaluation.       It was a pleasure speaking to Jolynn Haji today.  Signs and Symptoms of skin cancer discussed with patient.  Patient encouraged to perform monthly skin exams.  UV precautions reviewed with patient.  Risks of non-melanoma skin cancer discussed with patient   Return to clinic 6 months

## 2021-04-08 ENCOUNTER — DOCUMENTATION ONLY (OUTPATIENT)
Dept: OTHER | Facility: CLINIC | Age: 66
End: 2021-04-08

## 2021-05-10 DIAGNOSIS — I10 ESSENTIAL HYPERTENSION WITH GOAL BLOOD PRESSURE LESS THAN 140/90: ICD-10-CM

## 2021-05-11 RX ORDER — LISINOPRIL 5 MG/1
TABLET ORAL
Qty: 90 TABLET | Refills: 3 | Status: SHIPPED | OUTPATIENT
Start: 2021-05-11 | End: 2022-01-12

## 2021-09-24 ENCOUNTER — OFFICE VISIT (OUTPATIENT)
Dept: DERMATOLOGY | Facility: CLINIC | Age: 66
End: 2021-09-24
Payer: MEDICARE

## 2021-09-24 VITALS — DIASTOLIC BLOOD PRESSURE: 76 MMHG | SYSTOLIC BLOOD PRESSURE: 153 MMHG | OXYGEN SATURATION: 100 % | HEART RATE: 60 BPM

## 2021-09-24 DIAGNOSIS — D18.01 ANGIOMA OF SKIN: ICD-10-CM

## 2021-09-24 DIAGNOSIS — D22.9 NEVUS: Primary | ICD-10-CM

## 2021-09-24 DIAGNOSIS — D48.5 NEOPLASM OF UNCERTAIN BEHAVIOR OF SKIN: ICD-10-CM

## 2021-09-24 DIAGNOSIS — Z85.828 HISTORY OF BASAL CELL CARCINOMA (BCC): ICD-10-CM

## 2021-09-24 DIAGNOSIS — L82.1 SEBORRHEIC KERATOSIS: ICD-10-CM

## 2021-09-24 DIAGNOSIS — L81.4 LENTIGO: ICD-10-CM

## 2021-09-24 PROCEDURE — 99213 OFFICE O/P EST LOW 20 MIN: CPT | Mod: 25 | Performed by: PHYSICIAN ASSISTANT

## 2021-09-24 PROCEDURE — 11102 TANGNTL BX SKIN SINGLE LES: CPT | Performed by: PHYSICIAN ASSISTANT

## 2021-09-24 PROCEDURE — 88305 TISSUE EXAM BY PATHOLOGIST: CPT | Performed by: PATHOLOGY

## 2021-09-24 NOTE — PROGRESS NOTES
HPI:   Chief complaints: Jolynn Haji is a 66 year old female who presents for Full skin cancer screening to rule out skin cancer   Last Skin Exam: 1 year ago     1st Baseline: no  Personal HX of Skin Cancer: yes multiple BCC   Personal HX of Malignant Melanoma: no   Family HX of Skin Cancer / Malignant Melanoma: no  Personal HX of Atypical Moles:   no  Risk factors: history of sun exposure and burns; history of blistering burns in the past; history of tanning bed use in the past  New / Changing lesions:yes one spot on the left elbow present for several months and is itchy  Social History: has 2 children and 2 grandchildren  On review of systems, there are no further skin complaints, patient is feeling otherwise well.  See patient intake sheet.  ROS of the following were done and are negative: Constitutional, Eyes, Ears, Nose,   Mouth, Throat, Cardiovascular, Respiratory, GI, Genitourinary, Musculoskeletal,   Psychiatric, Endocrine, Allergic/Immunologic.    PHYSICAL EXAM:   BP (!) 153/76   Pulse 60   LMP 06/28/2001   SpO2 100%   Skin exam performed as follows: Type 2 skin. Mood appropriate  Alert and Oriented X 3. Well developed, well nourished in no distress.  General appearance: Normal  Head including face: Normal  Eyes: conjunctiva and lids: Normal  Mouth: Lips, teeth, gums: Normal  Neck: Normal  Chest-breast/axillae: Normal  Back: Normal  Spleen and liver: Normal  Cardiovascular: Exam of peripheral vascular system by observation for swelling, varicosities, edema: Normal  Genitalia: groin, buttocks: Normal  Extremities: digits/nails (clubbing): Normal  Eccrine and Apocrine glands: Normal  Right upper extremity: Normal  Left upper extremity: Normal  Right lower extremity: Normal  Left lower extremity: Normal  Skin: Scalp and body hair: See below    Pt deferred exam of breasts, groin, buttocks: No    Other physical findings:  1. Multiple pigmented macules on extremities and trunk  2. Multiple pigmented macules on  face, trunk and extremities  3. Multiple vascular papules on trunk, arms and legs  4. Multiple scattered keratotic plaques  5. 4 mm pink papule on the left shin        Except as noted above, no other signs of skin cancer or melanoma.     ASSESSMENT/PLAN:   Benign Full skin cancer screening today. . Patient with history of multiple BCC  Advised on monthly self exams and 1 year  Patient Education: Appropriate brochures given.    1. Multiple benign appearing nevi on arms, legs and trunk. Discussed ABCDEs of melanoma and sunscreen.   2. Multiple lentigos on arms, legs and trunk. Advised benign, no treatment needed.  3. Multiple scattered angiomas. Advised benign, no treatment needed.   4. Seborrheic keratosis on arms, legs and trunk. Advised benign, no treatment needed.  5. R/o SCC on the left shin. Shave biopsy performed.  Area cleaned and anesthetized with 1% lidocaine with epinephrine.  Dermablade used to remove the lesion and sent to pathology. Bleeding was cauterized. Pt tolerated procedure well with no complications.   6. [place] to follow up with Primary Care provider regarding elevated blood pressure.            Follow-up: yearly FSE/PRN sooner    1.) Patient was asked about new and changing moles. YES  2.) Patient received a complete physical skin examination: YES  3.) Patient was counseled to perform a monthly self skin examination: YES  Scribed By: Ligia Carbajal MS, PALEIGH ANN

## 2021-09-24 NOTE — LETTER
9/24/2021         RE: Jolynn Haji  [address] [facility] [place] [address]        Dear Colleague,    Thank you for referring your patient, Jolynn Haji, to the [facility]. Please see a copy of my visit note below.    HPI:   Chief complaints: Jolynn Haji is a 66 year old female who presents for Full skin cancer screening to rule out skin cancer   Last Skin Exam: 1 year ago     1st Baseline: no  Personal HX of Skin Cancer: yes multiple BCC   Personal HX of Malignant Melanoma: no   Family HX of Skin Cancer / Malignant Melanoma: no  Personal HX of Atypical Moles:   no  Risk factors: history of sun exposure and burns; history of blistering burns in the past; history of tanning bed use in the past  New / Changing lesions:yes one spot on the left elbow present for several months and is itchy  Social History: has 2 children and 2 grandchildren  On review of systems, there are no further skin complaints, patient is feeling otherwise well.  See patient intake sheet.  ROS of the following were done and are negative: Constitutional, Eyes, Ears, Nose,   Mouth, Throat, Cardiovascular, Respiratory, GI, Genitourinary, Musculoskeletal,   Psychiatric, Endocrine, Allergic/Immunologic.    PHYSICAL EXAM:   BP (!) 153/76   Pulse 60   LMP 06/28/2001   SpO2 100%   Skin exam performed as follows: Type 2 skin. Mood appropriate  Alert and Oriented X 3. Well developed, well nourished in no distress.  General appearance: Normal  Head including face: Normal  Eyes: conjunctiva and lids: Normal  Mouth: Lips, teeth, gums: Normal  Neck: Normal  Chest-breast/axillae: Normal  Back: Normal  Spleen and liver: Normal  Cardiovascular: Exam of peripheral vascular system by observation for swelling, varicosities, edema: Normal  Genitalia: groin, buttocks: Normal  Extremities: digits/nails (clubbing): Normal  Eccrine and Apocrine glands: Normal  Right upper extremity: Normal  Left upper extremity: Normal  Right lower  extremity: Normal  Left lower extremity: Normal  Skin: Scalp and body hair: See below    Pt deferred exam of breasts, groin, buttocks: No    Other physical findings:  1. Multiple pigmented macules on extremities and trunk  2. Multiple pigmented macules on face, trunk and extremities  3. Multiple vascular papules on trunk, arms and legs  4. Multiple scattered keratotic plaques  5. 4 mm pink papule on the left shin        Except as noted above, no other signs of skin cancer or melanoma.     ASSESSMENT/PLAN:   Benign Full skin cancer screening today. . Patient with history of multiple BCC  Advised on monthly self exams and 1 year  Patient Education: Appropriate brochures given.    1. Multiple benign appearing nevi on arms, legs and trunk. Discussed ABCDEs of melanoma and sunscreen.   2. Multiple lentigos on arms, legs and trunk. Advised benign, no treatment needed.  3. Multiple scattered angiomas. Advised benign, no treatment needed.   4. Seborrheic keratosis on arms, legs and trunk. Advised benign, no treatment needed.  5. R/o SCC on the left shin. Shave biopsy performed.  Area cleaned and anesthetized with 1% lidocaine with epinephrine.  Dermablade used to remove the lesion and sent to pathology. Bleeding was cauterized. Pt tolerated procedure well with no complications.   6. Azalea to follow up with Primary Care provider regarding elevated blood pressure.            Follow-up: yearly FSE/PRN sooner    1.) Patient was asked about new and changing moles. YES  2.) Patient received a complete physical skin examination: YES  3.) Patient was counseled to perform a monthly self skin examination: YES  Scribed By: Ligia Carbajal MS, PALEIGH ANN          Again, thank you for allowing me to participate in the care of your patient.        Sincerely,        Ligia Carbajal PA-C

## 2021-09-24 NOTE — PATIENT INSTRUCTIONS
Wound Care Instructions     FOR SUPERFICIAL WOUNDS     [facility] [phone]                 AFTER 24 HOURS YOU SHOULD REMOVE THE BANDAGE AND BEGIN DAILY DRESSING CHANGES AS FOLLOWS:     1) Remove Dressing.     2) Clean and dry the area with tap water using a Q-tip or sterile gauze pad.     3) Apply Vaseline, Aquaphor, Polysporin ointment or Bacitracin ointment over entire wound.  Do NOT use Neosporin ointment.     4) Cover the wound with a band-aid, or a sterile non-stick gauze pad and micropore paper tape    REPEAT THESE INSTRUCTIONS AT LEAST ONCE A DAY UNTIL THE WOUND HAS COMPLETELY HEALED.    It is an old wives tale that a wound heals better when it is exposed to air and allowed to dry out. The wound will heal faster with a better cosmetic result if it is kept moist with ointment and covered with a bandage.    **Do not let the wound dry out.**    Supplies Needed:      *Cotton tipped applicators (Q-tips)    *Vaseline, Aquaphor, Polysporin or Bacitracin Ointment (NOT NEOSPORIN)    *Band-aids or non-stick gauze pads and micropore paper tape.      PATIENT INFORMATION:    During the healing process you will notice a number of changes. All wounds develop a small halo of redness surrounding the wound.  This means healing is occurring. Severe itching with extensive redness usually indicates sensitivity to the ointment or bandage tape used to dress the wound.  You should call our office if this develops.      Swelling  and/or discoloration around your surgical site is common, particularly when performed around the eye.    All wounds normally drain.  The larger the wound the more drainage there will be.  After 7-10 days, you will notice the wound beginning to shrink and new skin will begin to grow.  The wound is healed when you can see skin has formed over the entire area.  A healed wound has a healthy, shiny look to the surface and is red to dark pink in color to normalize.  Wounds may take approximately  4-6 weeks to heal.  Larger wounds may take 6-8 weeks.  After the wound is healed you may discontinue dressing changes.    You may experience a sensation of tightness as your wound heals. This is normal and will gradually subside.    Your healed wound may be sensitive to temperature changes. This sensitivity improves with time, but if you re having a lot of discomfort, try to avoid temperature extremes.    Patients frequently experience itching after their wound appears to have healed because of the continue healing under the skin.  Plain Vaseline will help relieve the itching.      POSSIBLE COMPLICATIONS    BLEEDIN. Leave the bandage in place.  2. Use tightly rolled up gauze or a cloth to apply direct pressure over the bandage for 30  minutes.  3. Reapply pressure for an additional 30 minutes if necessary  4. Use additional gauze and tape to maintain pressure once the bleeding has stopped.

## 2021-09-28 ENCOUNTER — TELEPHONE (OUTPATIENT)
Dept: DERMATOLOGY | Facility: CLINIC | Age: 66
End: 2021-09-28

## 2021-09-28 LAB
PATH REPORT.COMMENTS IMP SPEC: NORMAL
PATH REPORT.COMMENTS IMP SPEC: NORMAL
PATH REPORT.FINAL DX SPEC: NORMAL
PATH REPORT.GROSS SPEC: NORMAL
PATH REPORT.MICROSCOPIC SPEC OTHER STN: NORMAL
PATH REPORT.RELEVANT HX SPEC: NORMAL

## 2021-09-28 NOTE — TELEPHONE ENCOUNTER
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

## 2021-09-28 NOTE — LETTER
[address]  [address]  [phone]    9/28/2021       Jolynn Haji  [address]  [place] [address]      Dear Jolynn:    You are scheduled for Mohs Surgery on: 12/16/21 @8:15am.    Please check in at [facility], [address].     You don't need to arrive more than 5-10 minutes prior to your appointment time.     Be sure to eat a good breakfast and bathe and wash your hair prior to surgery.     If you are taking any anti-coagulants that are prescribed by your Doctor (such as Coumadin/Warfarin, Plavix, Aspirin, Ibuprofen), please continue taking them.     However, if you are taking anti-coagulants over the counter without a Doctor's order for a medical condition, please discontinue them 10 days prior to surgery.     Please wear loose comfortable clothing as it could possibly be 4-6 hours until your surgery is completed depending upon how many layers of tissue need to be removed.      Thank you,    JASBIR Rogers MD

## 2021-09-28 NOTE — TELEPHONE ENCOUNTER
Called and spoke to patient.    Educated patient on biopsy results- SCCIS (mohs).    Educated patient on SCCIS, mohs, scheduled mohs appointment, and letter/packet sent.    Patient voiced understanding.    Anne-Marie RN-BSN-PHN  [facility]  [phone]

## 2021-10-23 ENCOUNTER — HEALTH MAINTENANCE LETTER (OUTPATIENT)
Age: 66
End: 2021-10-23

## 2021-11-09 ENCOUNTER — HOSPITAL ENCOUNTER (OUTPATIENT)
Dept: MAMMOGRAPHY | Facility: CLINIC | Age: 66
Discharge: HOME | End: 2021-11-09
Attending: FAMILY MEDICINE | Admitting: FAMILY MEDICINE
Payer: MEDICARE

## 2021-11-09 DIAGNOSIS — Z12.31 VISIT FOR SCREENING MAMMOGRAM: ICD-10-CM

## 2021-11-09 PROCEDURE — 77063 BREAST TOMOSYNTHESIS BI: CPT

## 2021-11-15 NOTE — RESULT ENCOUNTER NOTE
Your mammogram was normal.     Thank you so much for choosing [facility].  Please contact us with any questions that you may have.   We appreciate the opportunity to serve you now and look forward to supporting your healthcare needs for a long time to come!    Most Sincerely,     Helga Ibarra MD

## 2021-11-20 ENCOUNTER — TELEPHONE (OUTPATIENT)
Dept: FAMILY MEDICINE | Facility: CLINIC | Age: 66
End: 2021-11-20
Payer: MEDICARE

## 2021-11-20 NOTE — TELEPHONE ENCOUNTER
Reason for call:  Other   Patient called regarding (reason for call): call back  Additional comments:   Patient would like a call back asap. She is not sleeping. This has been going on for 3 weeks. The last time this happened it lasted a month.     Phone number to reach patient:  Cell number on file:    Telephone Information:   Mobile [phone]       Best Time:  Soonest convenience.    Can we leave a detailed message on this number?  YES    Travel screening: Not Applicable

## 2021-11-22 ENCOUNTER — VIRTUAL VISIT (OUTPATIENT)
Dept: FAMILY MEDICINE | Facility: CLINIC | Age: 66
End: 2021-11-22
Payer: MEDICARE

## 2021-11-22 DIAGNOSIS — F51.02 ADJUSTMENT INSOMNIA: ICD-10-CM

## 2021-11-22 DIAGNOSIS — F43.23 ADJUSTMENT DISORDER WITH MIXED ANXIETY AND DEPRESSED MOOD: Primary | ICD-10-CM

## 2021-11-22 PROCEDURE — 99214 OFFICE O/P EST MOD 30 MIN: CPT | Mod: 95 | Performed by: FAMILY MEDICINE

## 2021-11-22 RX ORDER — ARIPIPRAZOLE 5 MG/1
5 TABLET ORAL DAILY
Qty: 30 TABLET | Refills: 1 | Status: SHIPPED | OUTPATIENT
Start: 2021-11-22 | End: 2022-01-12

## 2021-11-22 ASSESSMENT — ANXIETY QUESTIONNAIRES
3. WORRYING TOO MUCH ABOUT DIFFERENT THINGS: NOT AT ALL
GAD7 TOTAL SCORE: 3
5. BEING SO RESTLESS THAT IT IS HARD TO SIT STILL: NOT AT ALL
IF YOU CHECKED OFF ANY PROBLEMS ON THIS QUESTIONNAIRE, HOW DIFFICULT HAVE THESE PROBLEMS MADE IT FOR YOU TO DO YOUR WORK, TAKE CARE OF THINGS AT HOME, OR GET ALONG WITH OTHER PEOPLE: NOT DIFFICULT AT ALL
7. FEELING AFRAID AS IF SOMETHING AWFUL MIGHT HAPPEN: NOT AT ALL
2. NOT BEING ABLE TO STOP OR CONTROL WORRYING: SEVERAL DAYS
6. BECOMING EASILY ANNOYED OR IRRITABLE: NOT AT ALL
1. FEELING NERVOUS, ANXIOUS, OR ON EDGE: MORE THAN HALF THE DAYS

## 2021-11-22 ASSESSMENT — PATIENT HEALTH QUESTIONNAIRE - PHQ9: 5. POOR APPETITE OR OVEREATING: NOT AT ALL

## 2021-11-22 NOTE — TELEPHONE ENCOUNTER
Huddled with MD MARCOS have pt ng VV at 4:30 today         Called #   Telephone Information:   Mobile [phone]   Advised pt on the information above     Patient stated an understanding and agreed with plan.      Shani Bunch RN, BSN  [facility]

## 2021-11-22 NOTE — TELEPHONE ENCOUNTER
Called # below     Pt stated that her anxiety has increased over the last few weeks and she is not sleeping as much as she did before has been decreasing the hours each night se is presently sleeping maybe 3- ZAHRA hours a night.     Has some family stressors going on too     Pt has been doing yoga breathing and doing the calm lorena.   Have taken tylenol for sleep -     Said she has tried sleepy time tea and melatonin and has not helped with keeping her asleep.     Pt is presently on lexapro 10 mg and would like to discuss to get something to help her sleep and fix her anxiety     She wants to be seen today as she has not slept much this weekend     Rn advised pt that MD MARCOS is booked - pt asked RN to please please ask her to be seen today     RN advises pt she will discuss with MARCOS NOEL     Patient stated an understanding and agreed with plan.    Shani Bunch RN, BSN  [facility]

## 2021-11-22 NOTE — PROGRESS NOTES
"Azalea is a 66 year old who is being evaluated via a billable telephone visit.      What phone number would you like to be contacted at? [phone]  How would you like to obtain your AVS? Franciscat    Assessment & Plan :       ICD-10-CM    1. Adjustment disorder with mixed anxiety and depressed mood- mild at this time  F43.23 ARIPiprazole (ABILIFY) 5 MG tablet   2. Adjustment insomnia  F51.02 ARIPiprazole (ABILIFY) 5 MG tablet        Try generic abilify 5mg at bedtime- 10pm to help with staying asleep and decreasing anxiety and depression. Take for 2-3 weeks or more to get you through your current stressors.     Try short , positive sleep specific mantra after awakening.      Ok to try benadryl 25mg or Unisom 1 tab otc  at bedtime tonight.  10pm - plan to sleep about 8 hours.      Ordering of each unique test  Prescription drug management  30 minutes spent on the date of the encounter doing chart review, history and exam, documentation and further activities per the note       sleepMEDICATIONS:  Continue current medications without change  Regular exercise  See Patient Instructions    Return in about 2 weeks (around 2021) for depression/anxiety & insomnia  follow up, as a telephone visit, with Dr. Ibarra.          Helga Ibarra MD  [facility] [place]    Subjective :   Azalea is a 66 year old who presents for the following health issues : mostly insomnia.     HPI insomnia started about 3 weeks ago. She's had a lot of emotional stress lately.    She has a friend who is moving to senior living.  , Zach, has had a bunch of doctor's appointments lately.  He's short of breath. He's had an echocardiogram. Has some fluid around one of his lungs. Neither patient nor her  have had COVID-19.   Malini , her daughter, has to have surgery - she has a mass in her abdomen under her  scar.  Her son , Lb is in school - age 6. Erasmo is 2.5yrs old and is a \"real pistol.\" " "    Falls asleep ok, but then awakens at 5am, then 4am to urinate. Doesn't turn any lights.  Does tend to look at the clock.     From Triage Note from this am:   \" Triaged 11/22/2021 -- 12:40p  Pt stated that her anxiety has increased over the last few weeks and she is not sleeping as much as she did before has been decreasing the hours each night se is presently sleeping maybe 3- ZAHRA hours a night.      Has some family stressors going on too.      Pt has been doing yoga breathing and doing the calm lorena.   Have taken tylenol for sleep -      Said she has tried sleepy time Chamomile tea and melatonin ( can't recall the dosage)  and did not help  with keeping her asleep.       Pt is presently on lexapro 10 mg and would like to discuss to get something to help her sleep and fix her anxiety      She wants to be seen today as she has not slept much this weekend      Rn advised pt that MD MARCOS is booked - pt asked RN to please please ask her to be seen today      RN advises pt she will discuss with MARCOS NOEL      Patient stated an understanding and agreed with plan.     Shani Bunch RN, BSN  [facility] Triage\"    A couple of years ago was on mirtazepine and seroquel for sleep and anxiety.  Her anxiety is about the same as several years ago. Has noticed more rapid heartbeats than previous.  Has the CALM lorena for her smartphone.      TSH   Date Value Ref Range Status   01/13/2021 2.07 0.40 - 4.00 mU/L Final         PHQ 10/10/2019 1/13/2021 11/22/2021   PHQ-9 Total Score 0 0 7   Q9: Thoughts of better off dead/self-harm past 2 weeks Not at all Not at all Not at all   Some encounter information is confidential and restricted. Go to Review Flowsheets activity to see all data.     TIFFANY-7 SCORE 1/13/2021 1/13/2021 11/22/2021   Total Score - - -   Total Score 0 0 3     Last PHQ-9 11/22/2021   1.  Little interest or pleasure in doing things 0   2.  Feeling down, depressed, or hopeless 0   3.  Trouble falling or " staying asleep, or sleeping too much 3   4.  Feeling tired or having little energy 3   5.  Poor appetite or overeating 1   6.  Feeling bad about yourself 0   7.  Trouble concentrating 0   8.  Moving slowly or restless 0   Q9: Thoughts of better off dead/self-harm past 2 weeks 0   PHQ-9 Total Score 7   Difficulty at work, home, or with people Not difficult at all     TIFFANY-7  11/22/2021   1. Feeling nervous, anxious, or on edge 2   2. Not being able to stop or control worrying 1   3. Worrying too much about different things 0   4. Trouble relaxing 0   5. Being so restless that it is hard to sit still 0   6. Becoming easily annoyed or irritable 0   7. Feeling afraid, as if something awful might happen 0   TIFFANY-7 Total Score 3   If you checked any problems, how difficult have they made it for you to do your work, take care of things at home, or get along with other people? Not difficult at all       Review of Systems : mirtazepine is a potential high risk for serotonin syndrome    Constitutional, HEENT, cardiovascular, pulmonary, GI, , musculoskeletal, neuro, skin, endocrine and psych systems are negative, except as otherwise noted.      Objective           Vitals:  No vitals were obtained today due to virtual visit.    Physical Exam   healthy, alert and no distress  PSYCH: Alert and oriented times 3; coherent speech, normal   rate and volume, able to articulate logical thoughts, able   to abstract reason, no tangential thoughts, no hallucinations   or delusions  Her affect is normal  RESP: No cough, no audible wheezing, able to talk in full sentences  Remainder of exam unable to be completed due to telephone visits    Office Visit on 09/24/2021   Component Date Value Ref Range Status     Case Report 09/24/2021    Final                    Value:Surgical Pathology Report                         Case: [id number]                                  Authorizing Provider:  Ligia Carbajal PA-C    Collected:            09/24/2021 10:33 AM          Ordering Location:     [facility]   Received:            09/24/2021 02:18 PM                                 [facility]                                                           Pathologist:           Rodrigo Taylor MD                                                      Specimen:    Skin, left shin                                                                             Final Diagnosis 09/24/2021    Final                    Value:This result contains rich text formatting which cannot be displayed here.     Clinical Information 09/24/2021    Final                    Value:This result contains rich text formatting which cannot be displayed here.     Gross Description 09/24/2021    Final                    Value:This result contains rich text formatting which cannot be displayed here.     Microscopic Description 09/24/2021    Final                    Value:This result contains rich text formatting which cannot be displayed here.     Performing Labs 09/24/2021    Final                    Value:This result contains rich text formatting which cannot be displayed here.               Phone call duration: 28 minutes

## 2021-11-22 NOTE — PATIENT INSTRUCTIONS
[facility]  [address] [place] [id number]  Office: [phone]   Fax:    [phone]     Try generic abilify 5mg at bedtime- 10pm to help with staying asleep and decreasing anxiety and depression. Take for 2-3 weeks or more to get you through your current stressors.     Try short , positive sleep specific mantra after awakening.      Ok to try benadryl 25mg or Unisom 1 tab otc  at bedtime tonight.  10pm - plan to sleep about 8 hours.      Please, call our clinic or go to the ER immediately if signs or symptoms worsen or fail to improve as anticipated.     Patient Education     Insomnia  Insomnia is repeated difficulty going to sleep or staying asleep, or both. Whether you have insomnia is not defined by a specific amount of sleep. Different people need different amounts of sleep, and you may need more or less sleep at different times of your life.  There are 3 major types of insomnia: short-term, chronic, and  other.  Short-term, or acute insomnia lasts less than 3 months. The symptoms are temporary and can be linked directly to a stressor, such as the death of a loved one, financial problems, or a new physical problem. Short-term insomnia stops when the stressor resolves or the person adapts to its presence.  Chronic insomnia occurs at least 3 times a week and lasts longer than 3 months. Chronic insomnia can occur when either the cause of the sleeping problem is not clear, or the insomnia does not get better when the stressor is resolved. A number of other criteria are also used to make the diagnosis of chronic insomnia.    Other insomnia  is the third type of insomnia-related sleep disorders. This description applies to people who have problems getting to sleep or staying asleep, but don't meet all of the factors that describe either short-term or chronic insomnia.    Many things cause insomnia. Different people may have different causes. It can be from an underlying medical  or psychological condition, or lifestyle. It can also be primary insomnia, which means no cause can be found.  Causes of insomnia include:    Chronic medical problems- heart disease, gastrointestinal problems, hormonal changes, breathing problems    Anxiety    Stress    Depression    Pain    Work schedule    Sleep apnea    Illegal drugs    Certain medicines  Many different medicines can affect your sleep, such as stimulants, caffeine, alcohol, some decongestants, and diet pills. Other medicines may include some types of blood pressure pills, steroids, asthma medicines, antihistamines, antidepressants, seizure medicines and statins. Not all of these will affect your sleep, and they shouldn t be stopped without talking to your doctor.  Symptoms of insomnia can include:    Lying awake for long periods at night before falling asleep    Waking up several times during the night    Waking up early in the morning and not being able to get back to sleep    Feeling tired and not refreshed by sleep    Not being able to function properly during the day and finding it hard to concentrate    Irritability    Tiredness and fatigue during the day  Home care    Review your medicines with your doctor or pharmacist to find out if they can cause insomnia. Not all medicines will affect your sleep, but they shouldn't be stopped without reviewing them with your doctor. There may be serious side effects and consequences from suddenly stopping your medicines. Not taking them may cause strokes, heart attacks, and many other problems.    Caffeine, smoking, and alcohol also affect sleep. Limit your daily use and don't use these before bedtime. Alcohol may make you sleepy at first, but as its effects wear off, you may awaken a few hours later and have trouble returning to sleep.    Don't exercise, eat or drink large amounts of liquid within 2 hours of your bedtime.    Improve your sleep habits. Have a fixed bed and wake-up time. Try to keep  noise, light and heat in your bedroom at a comfortable level. Try using earplugs or eyeshades if needed.     Don't watch TV in bed.    If you don't fall asleep within 30 minutes, try to relax by reading or listening to soft music.    Limit daytime napping to one 30 minute period, early in the day.    Get regular exercise. Find other ways to lessen your stress level.    If a medicine was prescribed to help reset your sleep patterns, take it as directed. Sleeping pills are intended for short-term use, only. If taken for too long, the effect wears off while the risk of physical addiction and psychological dependence increases.  Sleep diary  If the cause isn t obvious and it is not improving, try keeping a  sleep diary  for a couple of weeks. Include in it:    The time you go to bed    How long it takes to fall asleep    How many times you wake up    What time you wake up    Your meal times and what you eat    What time you drink alcohol and caffeine    Your exercise habits and times  Follow-up care  Follow up with your healthcare provider, or as advised. If an X-ray or CT scan was done, you will be notified if there is a change in the reading, especially if it affects treatment.  Call 911  Call 911 if any of these occur:    Trouble breathing    Confusion or trouble waking    Fainting or loss of consciousness    Rapid heart rate    New chest, arm, shoulder, neck or upper back pain    Trouble with speech or vision, weakness of an arm or leg    Trouble walking or talking, loss of balance, numbness or weakness in one side of your body, facial droop  When to seek medical advice  Call your healthcare provider right away if any of these occur:    Extreme restlessness or irritability    Confusion or hallucinations (seeing or hearing things that are not there)    Anxiety, depression    Several days without sleeping  StayWell last reviewed this educational content on 5/1/2018 2000-2021 [employer]. All rights  "reserved. This information is not intended as a substitute for professional medical care. Always follow your healthcare professional's instructions.           Patient Education     Tips for Sleep Hygiene  \"Sleep hygiene\" means having good sleep habits.Follow these tips to sleep better at night:     Get on a schedule. Go to bed and get up at about the same time every day.    Listen to your body. Only try to sleep when you actually feel tired or sleepy.    Be patient. If you haven't been able to get to sleep after about 30 minutes or more, get up and do something calming or boring until you feel sleepy. Then return to bed and try again.    Don't have caffeine (coffee, tea, cola drinks, chocolate and some medicines), alcohol or nicotine (cigarettes). These can make it harder for you to fall asleep and stay asleep.    Use your bed for sleeping only. That means no TV, computer or homework in bed, especially during the evening and before bedtime.    Don't nap during the day. If you must nap, make sure it is for less than 20 minutes.    Create sleep rituals that remind your body it is time to sleep. Examples include breathing exercises, stretching or reading a book.    Avoid all electronic media (smart phone, computer, tablet) within 2 hours of bed time. The \"blue light\" in these devices activates the part of the brain that keeps you awake.    Dim the lights at night.    Get early morning sources of light (walk in the sunshine) to help set sleep patterns at night.    Try a bath or shower before bed. Having a warm bath 1 to 2 hours before bedtime can help you feel sleepy. Hot baths can make you alert, so be mindful of the temperature.    Don't watch the clock. Checking the clock during the night can wake you up. It can also lead to negative thoughts such as, \"I will never fall asleep,\" which can increase anxiety and sleeplessness.    Use a sleep diary. Track your sleep schedule to know your sleep patterns and to see where " you can improve.    Get regular exercise every day. Try not to do heavy exercise in the 4 hours before bedtime.    Eat a healthy, balanced diet.    Try eating a light, healthy snack before bed, but avoid eating a heavy meal.    Create the right sleeping area. A cool, dark, quiet room is best. If needed, try earplugs, fans and blackout curtains.    Keep your daytime routine the same even if you have a bad night sleep. Avoiding activities the next day can make it harder to sleep.  For informational purposes only. Not to replace the advice of your health care provider.   Copyright   2013 [employer]. All rights reserved. [employer] 107415 - 01/16.           Patient Education     Treating Insomnia     Learning to relax before bedtime can improve your sleep.   Good sleeping habits are a key part of treatment. If needed, some medicines may help you sleep better at first. Making healthy lifestyle changes and learning to relax can improve your sleep. Treating insomnia takes commitment. But trust that your efforts will pay off. Be sure to talk with your healthcare provider before taking any medicine.  Healthy lifestyle  Your lifestyle affects your health and your sleep. Here are some healthy habits:    Keep a regular sleep schedule. Go to bed and get up at the same time each day.    Exercise regularly. It may help you reduce stress. Avoid strenuous exercise for 2 to 4 hours before bedtime.    Avoid or limit naps, especially in the late afternoon.    Use your bed only for sleep and sex.    Don t spend too much time in bed trying to fall asleep. If you can t fall asleep, get up and do something (no electronics) until you become tired and drowsy.    Avoid or limit caffeine and nicotine for up to 6 hours before bedtime. They can keep you awake at night.     Also avoid alcohol for at least 4 to 6 hours before bedtime. It may help you fall asleep at first. But you will have more awakenings during the night. And your  sleep will not be restful.  Before bedtime  To sleep better every night, try these tips:    Have a bedtime routine to let your body and mind know when it s time to sleep.    Think of going to bed as relaxing and enjoyable. Sleep will come sooner.    If your worries don t let you sleep, write them down in a diary. Then close it, and go to bed.    Make sure the room is not too hot or too cold. If it s not dark enough, an eye mask can help. If it s noisy, try using earplugs.    Don't eat a large meal just before bedtime.    Remove noises, bright lights, TVs, cell phones, and computers from your sleeping environment.    Use a comfortable mattress and pillow.  Learn to relax  Stress, anxiety, and body tension may keep you awake at night. To unwind before bedtime, try a warm bath, meditation, or yoga. Also try the following:    Deep breathing. Sit or lie back in a chair. Take a slow, deep breath. Hold it for 5 counts. Then breathe out slowly through your mouth. Keep doing this until you feel relaxed.    Progressive muscle relaxation. Tense and then relax the muscles in your body as you breathe deeply. Start with your feet and work up your body to your neck and face.  Cognitive Behavioral Therapy (CBT)  CBT is the most effective treatment for long-term insomnia. It tries to address the underlying causes of your sleep problems, including your habits and how you think about sleep.  Individual Therapy  Montez Mobley PsyD,   Insomnia   [facility]    [facility]: [phone]    [facility]: [phone]  [facility]  Visit [email or web address] or call [phone] to find a clinic close to you.  Suggested resources  The resources below may help you relax. This list is for information only. [facility] is not responsible for the quality of services or the actions of any person or organization. There may be a fee to use some of these  resources.  Insomnia treatment books  Zoë Mind by Bhumika Miller and Paula Bunn (2013)  Overcoming Insomnia by Lb Mina and Bhumika Miller (2008)  Quiet Your Mind and Get to Sleep: Solutions to Insomnia for Those with Depression, Anxiety or Chronic Pain by Bhumika Miller and Paula Bunn (2009)  No More Sleepless Nights by Kolton Figueroa and Ximena Graham (1996)  Say Zoë to Insomnia by Leland Fowler (2009)  The Insomnia Workbook by Shani Dee and Cy Campbell (2009)  The Insomnia Answer by Giovany Denny and Franklin Gardiner (2006)  Stress management and relaxation books  The Relaxation and Stress Reduction Workbook by Faye Krishnan, Zara Arroyo and Robbie Shelley (2008)  Stress Management Workbook: Techniques and Self-Assessment Procedures by Meli Salguero and Antwon Hoffman (1997)  A Mindfulness-Based Stress Reduction Workbook by Joce Bassett and Linda Rico (2010)  The Complete Stress Management Workbook by Florencio Judge, Rolly Castillo and Artur Marmolejo (1996)  Online programs  www.SHUTi.me (pronounced shut eye). There is a fee for this program.   www.sleepIO.com (pronounced sleep ee oh). There is a fee for this program.  Other online resources  Deep breathing and progressive muscle relaxation (PMR):    http://www.[employer]  Meditation:    www.fragrantheart.[employer]    www.theMESoftguidedMESoftmeditation-site.com  Guided imagery:    http://www.[employer]    http://VOZ.[employer]  (click on  Resource Library,  then choose  Meditation, Relaxation, Guided Imagery )  Apps for your mobile device:    Autogenic Training Progressive Muscle Relaxation by [employer]    Calm: Meditation and Sleep Stories by [employer]    [employer] Timer - Meditation Cristhian by [employer].  This is not a prescription and these resources are optional. You must pay for any costs when using these resources. Please ask your insurance carrier if you can be  reimbursed for these resources. If so, you are responsible for sending the needed details to your insurance carrier. These resources may also be tax deductible as medical expenses. Check with your .  Date Last Reviewed: 5/18/2018  Clinically reviewed by Montez Mobley PsyD, LP, [facility], Director of the Insomnia and Behavioral Sleep Health Program, [facility].  For informational purposes only. Not to replace the advice of your health care provider.  Copyright   2018 [facility]. All rights reserved.         Thank you so much or choosing [facility]  for your Health Care. It was a pleasure seeing you at your visit today! Please contact us with any questions or concerns you may have.                   Helga Ibarra MD                              To reach your [facility] care team after hours call:   [phone]    PLEASE NOTE OUR HOURS HAVE CHANGED secondary to COVID-19 coronavirus pandemic, as we are trying to minimize patient exposure to the virus,  which is now widespread in the Critical access hospital.  These hours may change with very little notice.  We apologize for any inconvenience.       Our current clinic hours are:          Monday- Thursday   7:00am - 6:00pm  in person.      Friday  7:00am- 5:00pm                       Saturday and Sunday : Closed to in person and virtual visits        We have telephone and virtual visit times available between    7:00am - 6pm on Monday-Friday as well.                                                Phone:  [phone]      Our pharmacy hours: Monday through Friday 9:00am to 5:00pm                        Saturday - 9:00 am to 12 noon       Sunday : Closed.              Phone:  [phone]              ###  Please note: at this time we are not accepting any walk-in visits. ###      There is also information available at our web site:  [email or web address]    If your provider ordered any lab tests  and you do not receive the results within 10 business days, please call the clinic.    If you need a medication refill please contact your pharmacy.  Please allow 2 business days for your refill to be completed.    Our clinic offers telephone visits and e visits.  Please ask one of your team members to explain more.      Use EcoSwarmhart (secure email communication and access to your chart) to send your primary care provider a message or make an appointment. Ask someone on your Team how to sign up for [employer]arm[employer].

## 2021-11-23 ASSESSMENT — PATIENT HEALTH QUESTIONNAIRE - PHQ9: SUM OF ALL RESPONSES TO PHQ QUESTIONS 1-9: 7

## 2021-11-23 ASSESSMENT — ANXIETY QUESTIONNAIRES: GAD7 TOTAL SCORE: 3

## 2021-12-13 NOTE — PROGRESS NOTES
Surgical Office Location:  [facility]  [address] [place] [id number]

## 2021-12-16 ENCOUNTER — OFFICE VISIT (OUTPATIENT)
Dept: DERMATOLOGY | Facility: CLINIC | Age: 66
End: 2021-12-16
Payer: MEDICARE

## 2021-12-16 VITALS — HEART RATE: 74 BPM | SYSTOLIC BLOOD PRESSURE: 144 MMHG | DIASTOLIC BLOOD PRESSURE: 77 MMHG | OXYGEN SATURATION: 99 %

## 2021-12-16 DIAGNOSIS — D04.72 SQUAMOUS CELL CARCINOMA IN SITU OF SKIN OF LEFT LOWER LEG: Primary | ICD-10-CM

## 2021-12-16 PROCEDURE — 17313 MOHS 1 STAGE T/A/L: CPT | Performed by: DERMATOLOGY

## 2021-12-16 PROCEDURE — 99207 PR NO CHARGE LOS: CPT | Performed by: DERMATOLOGY

## 2021-12-16 NOTE — LETTER
2021         RE: Jolynn Haji  [address] [place] [address]        Dear Colleague,    Thank you for referring your patient, Jolynn Haji, to the [facility]. Please see a copy of my visit note below.    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  Patient has no other skin complaints today.  Remainder of the HPI, Meds, PMH, Allergies, FH, and SH was reviewed in chart.      Past Medical History:   Diagnosis Date     Abnormal glandular Papanicolaou smear of cervix- ASCUS in 2003    [facility] H-      Basal cell carcinoma of leg, right 2016    Dr Rogers     Colon polyps 2018    tubular adenoam - due 5 yrs - 2 mm     Concussion without loss of consciousness 2017     Hidradenitis     left  groin     HSIL (high grade squamous intraepithelial lesion) on Pap smear of cervix 2000    Normal paps from  to /     Hypertension goal BP (blood pressure) < 140/90      OA (osteoarthritis) of knee     moderate     Postmenopausal since 2008      Shingles 2010    left      Squamous cell carcinoma of skin, unspecified      Synovial cyst of popliteal space        Past Surgical History:   Procedure Laterality Date     COLONOSCOPY  2018    polyps, tubular - 2 mm - due 5 yrs     ZZC NONSPECIFIC PROCEDURE      Colposcopy        Family History   Problem Relation Age of Onset     Thyroid Disease Mother         Graves disease     Hypertension Mother      Diabetes Mother         type 2     Blood Disease Mother          from leukemia at age 78     Diabetes Daughter         Juvenile Diabetes     Heart Disease Father 62        MV problem with CHF  at 62/ from MI     Hypertension Father      Diabetes Son          Juvenile Diabetes     Cancer Maternal Uncle         type ??     Cancer Maternal Uncle         liver       Social History     Socioeconomic History     Marital status:      Spouse name: Zach     Number of children: 2     Years of education: 16     Highest education level: Not on file   Occupational History     Occupation: teacher first grade -retired at age 60     Comment: [employer]     Employer: [employer]   Tobacco Use     Smoking status: Never Smoker     Smokeless tobacco: Never Used   Substance and Sexual Activity     Alcohol use: Yes     Alcohol/week: 0.0 - 1.0 standard drinks     Comment: 2 per month     Drug use: No     Comment: no herbal meds either     Sexual activity: Yes     Partners: Male     Birth control/protection: Surgical     Comment: -was on DepoProvera since 2000-2/2008 -  had vasectomy   Other Topics Concern      Service No     Blood Transfusions No     Caffeine Concern No     Occupational Exposure No     Hobby Hazards No     Sleep Concern No     Stress Concern No     Weight Concern No     Special Diet No     Back Care No     Exercise Yes     Comment: regular      Bike Helmet No     Seat Belt Yes     Comment: always     Self-Exams Yes     Comment: SBE  encouraged monthly     Parent/sibling w/ CABG, MI or angioplasty before 65F 55M? No   Social History Narrative    [place] - [place] Cocker Spaniel - going to be 7 yrs old spring 2021---needs walks every day. --Helga Ibarra MD      January 13, 2021      Social Determinants of Health     Financial Resource Strain: Not on file   Food Insecurity: Not on file   Transportation Needs: Not on file   Physical Activity: Not on file   Stress: Not on file   Social Connections: Not on file   Intimate Partner Violence: Not on file   Housing Stability: Not on file       Outpatient Encounter Medications as of 12/16/2021   Medication Sig Dispense Refill     ARIPiprazole (ABILIFY) 5 MG tablet Take  1 tablet (5 mg) by mouth daily 30 tablet 1     clindamycin (CLINDAGEL) 1 % external gel Apply topically daily 60 g 11     escitalopram (LEXAPRO) 10 MG tablet Take 1 tablet (10 mg) by mouth daily 90 tablet 3     lisinopril (ZESTRIL) 5 MG tablet TAKE ONE TABLET BY MOUTH ONCE DAILY 90 tablet 3     No facility-administered encounter medications on file as of 12/16/2021.             O:   NAD, WDWN, Alert & Oriented, Mood & Affect wnl, Vitals stable   Here today alone   BP (!) 144/77   Pulse 74   LMP 06/28/2001   SpO2 99%    General appearance normal   Vitals stable   Alert, oriented and in no acute distress      Following lymph nodes palpated: popliteal no lad     L ant shin 9mm ill-defined scaly papule       Eyes: Conjunctivae/lids:Normal     ENT: Lips, buccal mucosa, tongue: normal    MSK:Normal    Cardiovascular: peripheral edema none    Pulm: Breathing Normal    Neuro/Psych: Orientation:Alert and Orientedx3 ; Mood/Affect:normal       A/P:  1. L shin squamous cell carcinoma in situ   MOHS:   Location    The rationale for Mohs surgery was discussed with the patient and consent was obtained.  The risks and benefits as well as alternatives to therapy were discussed, in detail.  Specifically, the risks of infection, scarring, bleeding, prolonged wound healing, incomplete removal, allergy to anesthesia, nerve injury and recurrence were addressed.  Indication for Mohs was Location. Prior to the procedure, the treatment site was clearly identified and, if available, confirmed with previous photos and confirmed by the patient   All components of the Universal Protocol/PAUSE rule were completed.  The Mohs surgeon operated in two distinct and integrated capacities as the surgeon and pathologist.      The area was prepped with Betasept.  A rim of normal appearing skin was marked circumferentially around the lesion.  The area was infiltrated with local anesthesia.  The tumor was first debulked to remove all clinically apparent  tumor.  An incision following the standard Mohs approach was done and the specimen was oriented,mapped and placed in 1 block(s).  Each specimen was then chromacoded and processed in the Mohs laboratory using standard Mohs technique and submitted for frozen section histology.  Frozen section analysis showed no residual tumor but CLEAR MARGINS.      The tumor was excised using standard Mohs technique in 1 stages(s).  CLEAR MARGINS OBTAINED and Final defect size was 1.3x1.4cm.     We discussed the options for wound management in full with the patient including risks/benefits/ possible outcomes.        REPAIR SECOND INTENT: We discussed the options for wound management in full with the patient including risks/benefits/possible outcomes. Decision made to allow the wound to heal by second intention. Cautery was used for for hemostasis. EBL minimal; complications none; wound care routine.  The patient was discharged in good condition and will return in one month or prn for wound evaluation.  It was a pleasure speaking to Jolynn Haji today.  Previous clinic notes and pertinent laboratory tests were reviewed prior to Jolynn Haji's visit.  Nature and genetics of benign skin lesions dicussed with patient.  Signs and Symptoms of skin cancer discussed with patient.  Patient encouraged to perform monthly skin exams.  UV precautions reviewed with patient.  Risks of non-melanoma skin cancer discussed with patient   Return to clinic 6 months        Again, thank you for allowing me to participate in the care of your patient.        Sincerely,        Sheldon Rogers MD

## 2021-12-16 NOTE — PROGRESS NOTES
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  Patient has no other skin complaints today.  Remainder of the HPI, Meds, PMH, Allergies, FH, and SH was reviewed in chart.      Past Medical History:   Diagnosis Date     Abnormal glandular Papanicolaou smear of cervix- ASCUS in 2003    [facility]-      Basal cell carcinoma of leg, right 2016    Dr Rogers     Colon polyps 2018    tubular adenoam - due 5 yrs - 2 mm     Concussion without loss of consciousness 2017     Hidradenitis     left  groin     HSIL (high grade squamous intraepithelial lesion) on Pap smear of cervix 2000    Normal paps from  to /     Hypertension goal BP (blood pressure) < 140/90      OA (osteoarthritis) of knee     moderate     Postmenopausal since 2008      Shingles 2010    left      Squamous cell carcinoma of skin, unspecified      Synovial cyst of popliteal space        Past Surgical History:   Procedure Laterality Date     COLONOSCOPY  2018    polyps, tubular - 2 mm - due 5 yrs     ZZC NONSPECIFIC PROCEDURE      Colposcopy        Family History   Problem Relation Age of Onset     Thyroid Disease Mother         Graves disease     Hypertension Mother      Diabetes Mother         type 2     Blood Disease Mother          from leukemia at age 78     Diabetes Daughter         Juvenile Diabetes     Heart Disease Father 62        MV problem with CHF  at 62/ from MI     Hypertension Father      Diabetes Son         Juvenile Diabetes     Cancer Maternal Uncle         type ??     Cancer Maternal Uncle         liver       Social History     Socioeconomic History     Marital status:      Spouse name: Zach     Number of children: 2     Years of education: 16     Highest education level: Not on file   Occupational History     Occupation: teacher first grade  -retired at age 60     Comment: [employer]     Employer: [employer]   Tobacco Use     Smoking status: Never Smoker     Smokeless tobacco: Never Used   Substance and Sexual Activity     Alcohol use: Yes     Alcohol/week: 0.0 - 1.0 standard drinks     Comment: 2 per month     Drug use: No     Comment: no herbal meds either     Sexual activity: Yes     Partners: Male     Birth control/protection: Surgical     Comment: -was on DepoProvera since 2000-2/2008 -  had vasectomy   Other Topics Concern      Service No     Blood Transfusions No     Caffeine Concern No     Occupational Exposure No     Hobby Hazards No     Sleep Concern No     Stress Concern No     Weight Concern No     Special Diet No     Back Care No     Exercise Yes     Comment: regular      Bike Helmet No     Seat Belt Yes     Comment: always     Self-Exams Yes     Comment: SBE  encouraged monthly     Parent/sibling w/ CABG, MI or angioplasty before 65F 55M? No   Social History Narrative    Casper Bryson - going to be 6 yrs old spring 2021---needs walks every day. --Helga Ibarra MD      January 13, 2021      Social Determinants of Health     Financial Resource Strain: Not on file   Food Insecurity: Not on file   Transportation Needs: Not on file   Physical Activity: Not on file   Stress: Not on file   Social Connections: Not on file   Intimate Partner Violence: Not on file   Housing Stability: Not on file       Outpatient Encounter Medications as of 12/16/2021   Medication Sig Dispense Refill     ARIPiprazole (ABILIFY) 5 MG tablet Take 1 tablet (5 mg) by mouth daily 30 tablet 1     clindamycin (CLINDAGEL) 1 % external gel Apply topically daily 60 g 11     escitalopram (LEXAPRO) 10 MG tablet Take 1 tablet (10 mg) by mouth daily 90 tablet 3     lisinopril (ZESTRIL) 5 MG tablet TAKE ONE TABLET BY MOUTH ONCE DAILY 90 tablet 3     No facility-administered encounter medications on file  as of 12/16/2021.             O:   NAD, WDWN, Alert & Oriented, Mood & Affect wnl, Vitals stable   Here today alone   BP (!) 144/77   Pulse 74   LMP 06/28/2001   SpO2 99%    General appearance normal   Vitals stable   Alert, oriented and in no acute distress      Following lymph nodes palpated: popliteal no lad     L ant shin 9mm ill-defined scaly papule       Eyes: Conjunctivae/lids:Normal     ENT: Lips, buccal mucosa, tongue: normal    MSK:Normal    Cardiovascular: peripheral edema none    Pulm: Breathing Normal    Neuro/Psych: Orientation:Alert and Orientedx3 ; Mood/Affect:normal       A/P:  1. L shin squamous cell carcinoma in situ   MOHS:   Location    The rationale for Mohs surgery was discussed with the patient and consent was obtained.  The risks and benefits as well as alternatives to therapy were discussed, in detail.  Specifically, the risks of infection, scarring, bleeding, prolonged wound healing, incomplete removal, allergy to anesthesia, nerve injury and recurrence were addressed.  Indication for Mohs was Location. Prior to the procedure, the treatment site was clearly identified and, if available, confirmed with previous photos and confirmed by the patient   All components of the Universal Protocol/PAUSE rule were completed.  The Mohs surgeon operated in two distinct and integrated capacities as the surgeon and pathologist.      The area was prepped with Betasept.  A rim of normal appearing skin was marked circumferentially around the lesion.  The area was infiltrated with local anesthesia.  The tumor was first debulked to remove all clinically apparent tumor.  An incision following the standard Mohs approach was done and the specimen was oriented,mapped and placed in 1 block(s).  Each specimen was then chromacoded and processed in the Mohs laboratory using standard Mohs technique and submitted for frozen section histology.  Frozen section analysis showed no residual tumor but CLEAR MARGINS.       The tumor was excised using standard Mohs technique in 1 stages(s).  CLEAR MARGINS OBTAINED and Final defect size was 1.3x1.4cm.     We discussed the options for wound management in full with the patient including risks/benefits/ possible outcomes.        REPAIR SECOND INTENT: We discussed the options for wound management in full with the patient including risks/benefits/possible outcomes. Decision made to allow the wound to heal by second intention. Cautery was used for for hemostasis. EBL minimal; complications none; wound care routine.  The patient was discharged in good condition and will return in one month or prn for wound evaluation.  It was a pleasure speaking to Jolynn Haji today.  Previous clinic notes and pertinent laboratory tests were reviewed prior to Jolynn Haji's visit.  Nature and genetics of benign skin lesions dicussed with patient.  Signs and Symptoms of skin cancer discussed with patient.  Patient encouraged to perform monthly skin exams.  UV precautions reviewed with patient.  Risks of non-melanoma skin cancer discussed with patient   Return to clinic 6 months

## 2021-12-16 NOTE — PATIENT INSTRUCTIONS
Open Wound Care     for _____shin_________        ? No strenuous activity for 48 hours    ? Take Tylenol as needed for discomfort.                                                .         ? Do not drink alcoholic beverages for 48 hours.    ? Keep the pressure bandage in place for 24 hours. If the bandage becomes blood tinged or loose, reinforce it with gauze and tape.        (Refer to the reverse side of this page for management of bleeding).    ? Remove bandage in 24 hours and begin wound care as follows:     1. Clean area with tap water using a Q tip or gauze pad, (shower / bathe normally)  2. Dry wound with Q tip or gauze pad  3. Apply Aquaphor, Vaseline, Polysporin or Bacitracin Ointment with a Q tip    Do NOT use Neosporin Ointment *  4. Cover the wound with a band-aid or nonstick gauze pad and paper tape.  5. Repeat wound care once a day until wound is completely healed.    It is an old wives tale that a wound heals better when it is exposed to air and allowed to dry out. The wound will heal faster with a better cosmetic result if it is kept moist with ointment and covered with a bandage.  Do not let the wound dry out.      Supplies Needed:                Qtips or gauze pads                Polysporin or Bacitracin Ointment                Bandaids or nonstick gauze pads and paper tape    Wound care kits and brown paper tape are available for purchase at   the pharmacy.    BLEEDIN. Use tightly rolled up gauze or cloth to apply direct pressure over the bandage for 20   minutes.  2. Reapply pressure for an additional 20 minutes if necessary  3. Call the office or go to the nearest emergency room if pressure fails to stop the bleeding.  4. Use additional gauze and tape to maintain pressure once the bleeding has stopped.  5. Begin wound care 24 hours after surgery as directed.                  WOUND HEALING    1. One week after surgery a pink / red halo will form around the outside of the wound.   This is  new skin.  2. The center of the wound will appear yellowish white and produce some drainage.  3. The pink halo will slowly migrate in toward the center of the wound until the wound is covered with new shiny pink skin.  4. There will be no more drainage when the wound is completely healed.  5. It will take six months to one year for the redness to fade.  6. The scar may be itchy, tight and sensitive to extreme temperatures for a year after the surgery.  7. Massaging the area several times a day for several minutes after the wound is completely healed will help the scar soften and normalize faster. Begin massage only after healing is complete.      In case of emergency call: Dr Rogers: [phone]    [facility]: [phone]    [facility]:[phone]

## 2022-01-10 DIAGNOSIS — I10 ESSENTIAL HYPERTENSION WITH GOAL BLOOD PRESSURE LESS THAN 140/90: ICD-10-CM

## 2022-01-10 DIAGNOSIS — F41.1 GAD (GENERALIZED ANXIETY DISORDER): ICD-10-CM

## 2022-01-10 DIAGNOSIS — F51.02 ADJUSTMENT INSOMNIA: ICD-10-CM

## 2022-01-10 DIAGNOSIS — F43.23 ADJUSTMENT DISORDER WITH MIXED ANXIETY AND DEPRESSED MOOD: ICD-10-CM

## 2022-01-10 DIAGNOSIS — Z51.81 MEDICATION MONITORING ENCOUNTER: ICD-10-CM

## 2022-01-12 RX ORDER — ESCITALOPRAM OXALATE 10 MG/1
10 TABLET ORAL DAILY
Qty: 90 TABLET | Refills: 0 | Status: SHIPPED | OUTPATIENT
Start: 2022-01-12 | End: 2022-01-31

## 2022-01-12 RX ORDER — ARIPIPRAZOLE 5 MG/1
5 TABLET ORAL DAILY
Qty: 90 TABLET | Refills: 0 | Status: SHIPPED | OUTPATIENT
Start: 2022-01-12 | End: 2022-01-31

## 2022-01-12 RX ORDER — LISINOPRIL 5 MG/1
TABLET ORAL
Qty: 90 TABLET | Refills: 0 | Status: SHIPPED | OUTPATIENT
Start: 2022-01-12 | End: 2022-01-31 | Stop reason: DRUGHIGH

## 2022-01-13 NOTE — TELEPHONE ENCOUNTER
Routing refill request to provider for review/approval because:  Patient needs to be seen because:    Blood pressure out of protocol range

## 2022-01-13 NOTE — TELEPHONE ENCOUNTER
Appointments in Next Year    Jan 31, 2022  8:20 AM  PHYSICAL with Helga Ibarra MD  [facility] ([facility] [place] ) [phone]         Done x 1 each.

## 2022-01-31 ENCOUNTER — OFFICE VISIT (OUTPATIENT)
Dept: FAMILY MEDICINE | Facility: CLINIC | Age: 67
End: 2022-01-31
Payer: COMMERCIAL

## 2022-01-31 VITALS
WEIGHT: 151 LBS | TEMPERATURE: 97.8 F | HEART RATE: 89 BPM | SYSTOLIC BLOOD PRESSURE: 128 MMHG | HEIGHT: 68 IN | BODY MASS INDEX: 22.88 KG/M2 | DIASTOLIC BLOOD PRESSURE: 78 MMHG | OXYGEN SATURATION: 99 %

## 2022-01-31 DIAGNOSIS — G47.00 INSOMNIA, UNSPECIFIED TYPE: ICD-10-CM

## 2022-01-31 DIAGNOSIS — Z51.81 MEDICATION MONITORING ENCOUNTER: ICD-10-CM

## 2022-01-31 DIAGNOSIS — I10 ESSENTIAL HYPERTENSION WITH GOAL BLOOD PRESSURE LESS THAN 140/90: ICD-10-CM

## 2022-01-31 DIAGNOSIS — F41.1 GAD (GENERALIZED ANXIETY DISORDER): ICD-10-CM

## 2022-01-31 DIAGNOSIS — E55.9 VITAMIN D DEFICIENCY: ICD-10-CM

## 2022-01-31 DIAGNOSIS — Z00.00 ENCOUNTER FOR MEDICARE ANNUAL WELLNESS EXAM: Primary | ICD-10-CM

## 2022-01-31 DIAGNOSIS — L71.9 ROSACEA: ICD-10-CM

## 2022-01-31 DIAGNOSIS — F43.23 ADJUSTMENT DISORDER WITH MIXED ANXIETY AND DEPRESSED MOOD: ICD-10-CM

## 2022-01-31 DIAGNOSIS — E78.5 HYPERLIPIDEMIA LDL GOAL <130: ICD-10-CM

## 2022-01-31 DIAGNOSIS — I73.00 RAYNAUD'S PHENOMENON WITHOUT GANGRENE: ICD-10-CM

## 2022-01-31 DIAGNOSIS — Z78.0 ASYMPTOMATIC MENOPAUSE: ICD-10-CM

## 2022-01-31 DIAGNOSIS — R20.0 NUMBNESS AND TINGLING OF BOTH FEET: ICD-10-CM

## 2022-01-31 DIAGNOSIS — R20.2 NUMBNESS AND TINGLING OF BOTH FEET: ICD-10-CM

## 2022-01-31 DIAGNOSIS — F51.02 ADJUSTMENT INSOMNIA: ICD-10-CM

## 2022-01-31 DIAGNOSIS — L57.8 SUN-DAMAGED SKIN: ICD-10-CM

## 2022-01-31 LAB
ALBUMIN SERPL-MCNC: 4.3 G/DL (ref 3.4–5)
ALP SERPL-CCNC: 68 U/L (ref 40–150)
ALT SERPL W P-5'-P-CCNC: 19 U/L (ref 0–50)
ANION GAP SERPL CALCULATED.3IONS-SCNC: 5 MMOL/L (ref 3–14)
AST SERPL W P-5'-P-CCNC: 9 U/L (ref 0–45)
BILIRUB SERPL-MCNC: 0.6 MG/DL (ref 0.2–1.3)
BUN SERPL-MCNC: 15 MG/DL (ref 7–30)
CALCIUM SERPL-MCNC: 9.5 MG/DL (ref 8.5–10.1)
CHLORIDE BLD-SCNC: 107 MMOL/L (ref 94–109)
CHOLEST SERPL-MCNC: 208 MG/DL
CO2 SERPL-SCNC: 28 MMOL/L (ref 20–32)
CREAT SERPL-MCNC: 0.68 MG/DL (ref 0.52–1.04)
CREAT UR-MCNC: 69 MG/DL
ERYTHROCYTE [DISTWIDTH] IN BLOOD BY AUTOMATED COUNT: 13 % (ref 10–15)
FASTING STATUS PATIENT QL REPORTED: YES
GFR SERPL CREATININE-BSD FRML MDRD: >90 ML/MIN/1.73M2
GLUCOSE BLD-MCNC: 89 MG/DL (ref 70–99)
HCT VFR BLD AUTO: 42.1 % (ref 35–47)
HDLC SERPL-MCNC: 82 MG/DL
HGB BLD-MCNC: 13.8 G/DL (ref 11.7–15.7)
LDLC SERPL CALC-MCNC: 110 MG/DL
MCH RBC QN AUTO: 29.7 PG (ref 26.5–33)
MCHC RBC AUTO-ENTMCNC: 32.8 G/DL (ref 31.5–36.5)
MCV RBC AUTO: 91 FL (ref 78–100)
MICROALBUMIN UR-MCNC: 6 MG/L
MICROALBUMIN/CREAT UR: 8.7 MG/G CR (ref 0–25)
NONHDLC SERPL-MCNC: 126 MG/DL
PLATELET # BLD AUTO: 212 10E3/UL (ref 150–450)
POTASSIUM BLD-SCNC: 4.1 MMOL/L (ref 3.4–5.3)
PROT SERPL-MCNC: 7.1 G/DL (ref 6.8–8.8)
RBC # BLD AUTO: 4.64 10E6/UL (ref 3.8–5.2)
SODIUM SERPL-SCNC: 140 MMOL/L (ref 133–144)
TRIGL SERPL-MCNC: 81 MG/DL
TSH SERPL DL<=0.005 MIU/L-ACNC: 1.53 MU/L (ref 0.4–4)
WBC # BLD AUTO: 6.5 10E3/UL (ref 4–11)

## 2022-01-31 PROCEDURE — 99397 PER PM REEVAL EST PAT 65+ YR: CPT | Performed by: FAMILY MEDICINE

## 2022-01-31 PROCEDURE — 80053 COMPREHEN METABOLIC PANEL: CPT | Performed by: FAMILY MEDICINE

## 2022-01-31 PROCEDURE — 36415 COLL VENOUS BLD VENIPUNCTURE: CPT | Performed by: FAMILY MEDICINE

## 2022-01-31 PROCEDURE — 99214 OFFICE O/P EST MOD 30 MIN: CPT | Mod: 25 | Performed by: FAMILY MEDICINE

## 2022-01-31 PROCEDURE — 84443 ASSAY THYROID STIM HORMONE: CPT | Performed by: FAMILY MEDICINE

## 2022-01-31 PROCEDURE — 82306 VITAMIN D 25 HYDROXY: CPT | Performed by: FAMILY MEDICINE

## 2022-01-31 PROCEDURE — 80061 LIPID PANEL: CPT | Performed by: FAMILY MEDICINE

## 2022-01-31 PROCEDURE — 82043 UR ALBUMIN QUANTITATIVE: CPT | Performed by: FAMILY MEDICINE

## 2022-01-31 PROCEDURE — 85027 COMPLETE CBC AUTOMATED: CPT | Performed by: FAMILY MEDICINE

## 2022-01-31 PROCEDURE — 96127 BRIEF EMOTIONAL/BEHAV ASSMT: CPT | Performed by: FAMILY MEDICINE

## 2022-01-31 RX ORDER — LACTOBACILLUS RHAMNOSUS GG 10B CELL
1 CAPSULE ORAL DAILY
COMMUNITY

## 2022-01-31 RX ORDER — LISINOPRIL 2.5 MG/1
2.5 TABLET ORAL DAILY
Qty: 90 TABLET | Refills: 3 | Status: SHIPPED | OUTPATIENT
Start: 2022-01-31 | End: 2023-01-10

## 2022-01-31 RX ORDER — CLINDAMYCIN PHOSPHATE 10 MG/G
GEL TOPICAL DAILY
Qty: 60 G | Refills: 11 | Status: SHIPPED | OUTPATIENT
Start: 2022-01-31 | End: 2024-02-05 | Stop reason: ALTCHOICE

## 2022-01-31 RX ORDER — ESCITALOPRAM OXALATE 10 MG/1
10 TABLET ORAL DAILY
Qty: 90 TABLET | Refills: 1 | Status: SHIPPED | OUTPATIENT
Start: 2022-01-31 | End: 2022-04-25

## 2022-01-31 RX ORDER — ARIPIPRAZOLE 5 MG/1
5 TABLET ORAL DAILY
Qty: 90 TABLET | Refills: 3 | Status: SHIPPED | OUTPATIENT
Start: 2022-01-31 | End: 2022-03-30

## 2022-01-31 ASSESSMENT — ANXIETY QUESTIONNAIRES
7. FEELING AFRAID AS IF SOMETHING AWFUL MIGHT HAPPEN: NOT AT ALL
2. NOT BEING ABLE TO STOP OR CONTROL WORRYING: NOT AT ALL
4. TROUBLE RELAXING: NOT AT ALL
1. FEELING NERVOUS, ANXIOUS, OR ON EDGE: NOT AT ALL
GAD7 TOTAL SCORE: 0
5. BEING SO RESTLESS THAT IT IS HARD TO SIT STILL: NOT AT ALL
3. WORRYING TOO MUCH ABOUT DIFFERENT THINGS: NOT AT ALL
6. BECOMING EASILY ANNOYED OR IRRITABLE: NOT AT ALL
GAD7 TOTAL SCORE: 0
7. FEELING AFRAID AS IF SOMETHING AWFUL MIGHT HAPPEN: NOT AT ALL
GAD7 TOTAL SCORE: 0

## 2022-01-31 ASSESSMENT — ENCOUNTER SYMPTOMS
HEMATURIA: 0
EYE PAIN: 0
NERVOUS/ANXIOUS: 0
WEAKNESS: 0
PARESTHESIAS: 0
HEADACHES: 0
FREQUENCY: 0
DIARRHEA: 0
PALPITATIONS: 0
JOINT SWELLING: 0
SHORTNESS OF BREATH: 0
DIZZINESS: 1
NAUSEA: 0
CHILLS: 0
BREAST MASS: 0
ABDOMINAL PAIN: 0
COUGH: 0
HEMATOCHEZIA: 0
DYSURIA: 0
MYALGIAS: 0
ARTHRALGIAS: 0
SORE THROAT: 0
HEARTBURN: 0
CONSTIPATION: 0
FEVER: 0

## 2022-01-31 ASSESSMENT — PATIENT HEALTH QUESTIONNAIRE - PHQ9
SUM OF ALL RESPONSES TO PHQ QUESTIONS 1-9: 0
SUM OF ALL RESPONSES TO PHQ QUESTIONS 1-9: 0

## 2022-01-31 ASSESSMENT — MIFFLIN-ST. JEOR: SCORE: 1264.46

## 2022-01-31 ASSESSMENT — ACTIVITIES OF DAILY LIVING (ADL): CURRENT_FUNCTION: NO ASSISTANCE NEEDED
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
## 2022-01-31 NOTE — PATIENT INSTRUCTIONS
[facility]  [address] [place] [address]  Office: [phone]   Fax:    [phone]       Look for waterproof shoe covers on [employer] -can put your crampons over top of those.         Patient Education   Personalized Prevention Plan  You are due for the preventive services outlined below.  Your care team is available to assist you in scheduling these services.  If you have already completed any of these items, please share that information with your care team to update in your medical record.  Health Maintenance Due   Topic Date Due     ANNUAL REVIEW OF HM ORDERS  Never done     Osteoporosis Screening  10/05/2020     Flu Vaccine (1) 09/01/2021     Basic Metabolic Panel  01/13/2022     Kidney Microalbumin Urine Test  01/13/2022     FALL RISK ASSESSMENT  01/13/2022     Annual Wellness Visit  01/13/2022           Thank you so much or choosing [facility]  for your Health Care. It was a pleasure seeing you at your visit today! Please contact us with any questions or concerns you may have.                   Helga Ibarra MD                              To reach your [facility] care team after hours call:   [phone]    PLEASE NOTE OUR HOURS HAVE CHANGED secondary to COVID-19 coronavirus pandemic, as we are trying to minimize patient exposure to the virus,  which is now widespread in the [facility].  These hours may change with very little notice.  We apologize for any inconvenience.       Our current clinic hours are:          Monday- Thursday   7:00am - 6:00pm  in person.      Friday  7:00am- 5:00pm                       Saturday and Sunday : Closed to in person and virtual visits        We have telephone and virtual visit times available between    7:00am - 6pm on Monday-Friday as well.                                                Phone:  [phone]      Our pharmacy hours: Monday through Friday 8:00am to  5:00pm                        Saturday - 9:00 am to 12 noon       Sunday : Closed.              Phone:  [phone]              ###  Please note: at this time we are not accepting any walk-in visits. ###      There is also information available at our web site:  [email or web address]    If your provider ordered any lab tests and you do not receive the results within 10 business days, please call the clinic.    If you need a medication refill please contact your pharmacy.  Please allow 3 business days for your refill to be completed.    Our clinic offers telephone visits and e visits.  Please ask one of your team members to explain more.      Use [employer] (secure email communication and access to your chart) to send your primary care provider a message or make an appointment. Ask someone on your Team how to sign up for [employer].

## 2022-02-01 ASSESSMENT — ANXIETY QUESTIONNAIRES: GAD7 TOTAL SCORE: 0

## 2022-02-01 ASSESSMENT — PATIENT HEALTH QUESTIONNAIRE - PHQ9: SUM OF ALL RESPONSES TO PHQ QUESTIONS 1-9: 0

## 2022-02-05 LAB
DEPRECATED CALCIDIOL+CALCIFEROL SERPL-MC: <40 UG/L (ref 20–75)
VITAMIN D2 SERPL-MCNC: <5 UG/L
VITAMIN D3 SERPL-MCNC: 35 UG/L

## 2022-03-28 ENCOUNTER — TELEPHONE (OUTPATIENT)
Dept: FAMILY MEDICINE | Facility: CLINIC | Age: 67
End: 2022-03-28
Payer: COMMERCIAL

## 2022-03-28 DIAGNOSIS — F51.02 ADJUSTMENT INSOMNIA: ICD-10-CM

## 2022-03-28 DIAGNOSIS — F43.23 ADJUSTMENT DISORDER WITH MIXED ANXIETY AND DEPRESSED MOOD: ICD-10-CM

## 2022-03-28 NOTE — TELEPHONE ENCOUNTER
Patient is having increased anxiety, fatigue and weight loss.  Made appointment for her to see you on 4/25 but she is wondering if you could increase  the Abilify until she can be seen. Please call patient to advise       Melvina Donald

## 2022-03-30 RX ORDER — ARIPIPRAZOLE 5 MG/1
7.5 TABLET ORAL DAILY
Qty: 135 TABLET | Refills: 0 | Status: SHIPPED | OUTPATIENT
Start: 2022-03-30 | End: 2022-04-29

## 2022-03-31 NOTE — TELEPHONE ENCOUNTER
Have pt increase her abilify to 7.5 mg = 1 and 1/2 -5mg tabs daily to see how she feels. New rx sent to pharmacy. Please inform patient.

## 2022-03-31 NOTE — TELEPHONE ENCOUNTER
Called and spoke with patient.     Advised of Dr. Ibarra's message. Patient stated an understanding and agreed with plan.     Lacy Virk RN  [facility]

## 2022-04-25 ENCOUNTER — TRANSFERRED RECORDS (OUTPATIENT)
Dept: HEALTH INFORMATION MANAGEMENT | Facility: CLINIC | Age: 67
End: 2022-04-25

## 2022-04-25 ENCOUNTER — OFFICE VISIT (OUTPATIENT)
Dept: FAMILY MEDICINE | Facility: CLINIC | Age: 67
End: 2022-04-25
Payer: COMMERCIAL

## 2022-04-25 VITALS
BODY MASS INDEX: 20.61 KG/M2 | DIASTOLIC BLOOD PRESSURE: 80 MMHG | OXYGEN SATURATION: 99 % | HEIGHT: 68 IN | WEIGHT: 136 LBS | HEART RATE: 110 BPM | TEMPERATURE: 98.2 F | SYSTOLIC BLOOD PRESSURE: 140 MMHG

## 2022-04-25 DIAGNOSIS — E55.9 VITAMIN D DEFICIENCY: ICD-10-CM

## 2022-04-25 DIAGNOSIS — Z51.81 MEDICATION MONITORING ENCOUNTER: ICD-10-CM

## 2022-04-25 DIAGNOSIS — F41.1 GAD (GENERALIZED ANXIETY DISORDER): ICD-10-CM

## 2022-04-25 DIAGNOSIS — F33.1 MAJOR DEPRESSIVE DISORDER, RECURRENT EPISODE, MODERATE (H): Primary | ICD-10-CM

## 2022-04-25 DIAGNOSIS — F43.23 ADJUSTMENT DISORDER WITH MIXED ANXIETY AND DEPRESSED MOOD: ICD-10-CM

## 2022-04-25 LAB
BASOPHILS # BLD AUTO: 0.1 10E3/UL (ref 0–0.2)
BASOPHILS NFR BLD AUTO: 1 %
EOSINOPHIL # BLD AUTO: 0.2 10E3/UL (ref 0–0.7)
EOSINOPHIL NFR BLD AUTO: 2 %
ERYTHROCYTE [DISTWIDTH] IN BLOOD BY AUTOMATED COUNT: 12.9 % (ref 10–15)
HCT VFR BLD AUTO: 41.1 % (ref 35–47)
HGB BLD-MCNC: 13.6 G/DL (ref 11.7–15.7)
IMM GRANULOCYTES # BLD: 0 10E3/UL
IMM GRANULOCYTES NFR BLD: 0 %
LYMPHOCYTES # BLD AUTO: 1.4 10E3/UL (ref 0.8–5.3)
LYMPHOCYTES NFR BLD AUTO: 17 %
MCH RBC QN AUTO: 29.7 PG (ref 26.5–33)
MCHC RBC AUTO-ENTMCNC: 33.1 G/DL (ref 31.5–36.5)
MCV RBC AUTO: 90 FL (ref 78–100)
MONOCYTES # BLD AUTO: 0.6 10E3/UL (ref 0–1.3)
MONOCYTES NFR BLD AUTO: 8 %
NEUTROPHILS # BLD AUTO: 5.9 10E3/UL (ref 1.6–8.3)
NEUTROPHILS NFR BLD AUTO: 72 %
PLATELET # BLD AUTO: 252 10E3/UL (ref 150–450)
RBC # BLD AUTO: 4.58 10E6/UL (ref 3.8–5.2)
WBC # BLD AUTO: 8.1 10E3/UL (ref 4–11)

## 2022-04-25 PROCEDURE — 99207 E-CONSULT TO BEHAVIORAL HEALTH (ADULT OUTPT PROVIDER TO SPECIALIST WRITTEN QUESTION & RESPONSE): CPT | Performed by: FAMILY MEDICINE

## 2022-04-25 PROCEDURE — 99215 OFFICE O/P EST HI 40 MIN: CPT | Performed by: FAMILY MEDICINE

## 2022-04-25 PROCEDURE — 84443 ASSAY THYROID STIM HORMONE: CPT | Performed by: FAMILY MEDICINE

## 2022-04-25 PROCEDURE — 82306 VITAMIN D 25 HYDROXY: CPT | Performed by: FAMILY MEDICINE

## 2022-04-25 PROCEDURE — 96127 BRIEF EMOTIONAL/BEHAV ASSMT: CPT | Performed by: FAMILY MEDICINE

## 2022-04-25 PROCEDURE — 36415 COLL VENOUS BLD VENIPUNCTURE: CPT | Performed by: FAMILY MEDICINE

## 2022-04-25 PROCEDURE — 80053 COMPREHEN METABOLIC PANEL: CPT | Performed by: FAMILY MEDICINE

## 2022-04-25 PROCEDURE — 85025 COMPLETE CBC W/AUTO DIFF WBC: CPT | Performed by: FAMILY MEDICINE

## 2022-04-25 RX ORDER — ESCITALOPRAM OXALATE 10 MG/1
20 TABLET ORAL DAILY
Qty: 180 TABLET | Refills: 1 | Status: SHIPPED | OUTPATIENT
Start: 2022-04-25 | End: 2022-04-26

## 2022-04-25 ASSESSMENT — ANXIETY QUESTIONNAIRES
7. FEELING AFRAID AS IF SOMETHING AWFUL MIGHT HAPPEN: SEVERAL DAYS
GAD7 TOTAL SCORE: 11
7. FEELING AFRAID AS IF SOMETHING AWFUL MIGHT HAPPEN: SEVERAL DAYS
1. FEELING NERVOUS, ANXIOUS, OR ON EDGE: SEVERAL DAYS
2. NOT BEING ABLE TO STOP OR CONTROL WORRYING: MORE THAN HALF THE DAYS
4. TROUBLE RELAXING: NEARLY EVERY DAY
3. WORRYING TOO MUCH ABOUT DIFFERENT THINGS: MORE THAN HALF THE DAYS
5. BEING SO RESTLESS THAT IT IS HARD TO SIT STILL: MORE THAN HALF THE DAYS
6. BECOMING EASILY ANNOYED OR IRRITABLE: NOT AT ALL
GAD7 TOTAL SCORE: 11
GAD7 TOTAL SCORE: 11

## 2022-04-25 ASSESSMENT — PATIENT HEALTH QUESTIONNAIRE - PHQ9
SUM OF ALL RESPONSES TO PHQ QUESTIONS 1-9: 16
10. IF YOU CHECKED OFF ANY PROBLEMS, HOW DIFFICULT HAVE THESE PROBLEMS MADE IT FOR YOU TO DO YOUR WORK, TAKE CARE OF THINGS AT HOME, OR GET ALONG WITH OTHER PEOPLE: NOT DIFFICULT AT ALL
SUM OF ALL RESPONSES TO PHQ QUESTIONS 1-9: 16

## 2022-04-25 NOTE — PATIENT INSTRUCTIONS
" [facility]  [address] [place] [address]  Office: [phone]   Fax:    [phone]     Decrease abilify ( aripiprazole) from 7.5mg to 5mg nightly - down from 1.5 tabs just to 1 tablet nightly.     Have Malini help you with sound machine through [facility] to help you sleep through night.     Ok for 1 over the counter Unisom at bedtime, if still having problems sleeping.     Increase lexapro to 20mg daily.- take 2-10mg tabs daily.     Await e-consult results.     To get to the [facility] directly, call clinic: [phone] then press #2 to speak with your care team, otherwise your call routes to central scheduling/triage  and you may have a longer call wait time.    Say, \"I'm returning Dr. Ibarra's call.\"  Or you can also ask to speak to a [facility] Triage nurse .     To call Dr. Ibarra's assistant , Pallavi REDDY - call [phone]  - if she is at her desk, she will answer.  If she is not at her desk, you will need to call back.     Appointments in Next Year        May 09, 2022  4:00 PM  (Arrive by 3:50 PM)  Provider Visit with Helga Ibarra MD  [facility] ([facility] ) [phone]     Aug 05, 2022 10:45 AM  (Arrive by 10:30 AM)  Return Visit with Ligia Carbajal PA-C  [facility] ([facility] ) [phone]                                               "

## 2022-04-25 NOTE — PROGRESS NOTES
Assessment & Plan       ICD-10-CM    1. Major depressive disorder, recurrent episode, moderate (H)- with psychomotor retardation   F33.1 Adult Mental Health  Referral     Adult E-Consult to Behavioral Health (Outpt Provider to Specialist Written Question & Response)     CBC with Platelets & Differential     Comprehensive metabolic panel     TSH with free T4 reflex     CBC with Platelets & Differential     Comprehensive metabolic panel     TSH with free T4 reflex   2. Adjustment disorder with mixed anxiety and depressed mood- moderate at this time  F43.23 Adult Mental Health  Referral     Adult E-Consult to Behavioral Health (Outpt Provider to Specialist Written Question & Response)     CBC with Platelets & Differential     Comprehensive metabolic panel     CBC with Platelets & Differential     Comprehensive metabolic panel     DISCONTINUED: escitalopram (LEXAPRO) 10 MG tablet   3. TIFFANY (generalized anxiety disorder)- well controlled currently - needs questionaire recheck & med refill   F41.1 Adult Mental Health  Referral     Adult E-Consult to Behavioral Health (Outpt Provider to Specialist Written Question & Response)     CBC with Platelets & Differential     Comprehensive metabolic panel     TSH with free T4 reflex     CBC with Platelets & Differential     Comprehensive metabolic panel     TSH with free T4 reflex     DISCONTINUED: escitalopram (LEXAPRO) 10 MG tablet   4. Medication monitoring encounter  Z51.81 DISCONTINUED: escitalopram (LEXAPRO) 10 MG tablet   5. Vitamin D deficiency  E55.9 25 Hydroxyvitamin D2 and D3     25 Hydroxyvitamin D2 and D3      Please, call our clinic or go to the ER immediately if signs or symptoms worsen or fail to improve as anticipated.   Increase lexapro to 20mg daily.   Consider adding wellbutrin xl in 2-4 weeks or sooner.   Will decrease abilify from 7.5mg to 5mg daily.   Ordering of each unique test  Prescription drug management  58 minutes spent on  the date of the encounter doing chart review, history and exam, documentation and further activities per the note    Decrease abilify ( aripiprazole) from 7.5mg to 5mg nightly - down from 1.5 tabs just to 1 tablet nightly.     Have Malini help you with sound machine to help you sleep through night.     Ok for Unisom at bedtime, if still having problems sleeping.     Increase lexapro to 20mg daily.  Consider adding bupropion xr ( wellbutrin XL) once max dose on lexapro is achieved.     Return in about 2 weeks (around 5/9/2022) for depression/anxiety follow up, with Dr. Ibarra, in person.     Await e-consult results.          Depression Screening Follow Up    PHQ 4/25/2022   PHQ-9 Total Score 16   Q9: Thoughts of better off dead/self-harm past 2 weeks Not at all     Last PHQ-9 4/25/2022   1.  Little interest or pleasure in doing things 1   2.  Feeling down, depressed, or hopeless 1   3.  Trouble falling or staying asleep, or sleeping too much 3   4.  Feeling tired or having little energy 3   5.  Poor appetite or overeating 3   6.  Feeling bad about yourself 0   7.  Trouble concentrating 2   8.  Moving slowly or restless 3   Q9: Thoughts of better off dead/self-harm past 2 weeks 0   PHQ-9 Total Score 16   Difficulty at work, home, or with people -       Follow Up Actions Taken  Mental Health Referral placed  Follow up recommended: see above for e-consult      MEDICATIONS:   Orders Placed This Encounter   Medications     DISCONTD: escitalopram (LEXAPRO) 10 MG tablet     Sig: Take 2 tablets (20 mg) by mouth daily     Dispense:  180 tablet     Refill:  1          - Continue other medications without change  CONSULTATION/REFERRAL to e- consult and referrals for psychiatry and for counseling/therapy as well.     See Patient Instructions    Return in about 2 weeks (around 5/9/2022) for depression/anxiety follow up, with Dr. Ibarra, in person.            Helga Ibarra MD  [facility]  "[place]        Subjective :   Azalea is a 66 year old who presents for the following health issues  accompanied by her grown  daughter, Malini. .    History of Present Illness       Mental Health Follow-up:  Patient presents to follow-up on Depression & Anxiety.Patient's depression since last visit has been:  Worse  The patient is having other symptoms associated with depression.  Patient's anxiety since last visit has been:  Worse  The patient is having other symptoms associated with anxiety.  Any significant life events: health concerns  Patient is feeling anxious or having panic attacks.  Patient has no concerns about alcohol or drug use.       Today's PHQ-9         PHQ-9 Total Score: 16  PHQ-9 Q9 Thoughts of better off dead/self-harm past 2 weeks :   (P) Not at all    How difficult have these problems made it for you to do your work, take care of things at home, or get along with other people: Not difficult at all    Today's TIFFANY-7 Score: 11    Reason for visit:  Discuss changes in medication  Symptom onset:  More than a month  Symptoms include:  Anxiety, weight loss  Symptom intensity:  Severe  Symptom progression:  Worsening  Had these symptoms before:  Yes  Has tried/received treatment for these symptoms:  Yes  Previous treatment was successful:  Yes  Prior treatment description:  Medication  What makes it worse:  No  What makes it better:  Sleep    She eats 2-3 servings of fruits and vegetables daily.She consumes 0 sweetened beverage(s) daily.She exercises with enough effort to increase her heart rate 30 to 60 minutes per day.  She exercises with enough effort to increase her heart rate 7 days per week.   She is taking medications regularly.     20 lbs weight loss over the last 6 weeks - doesn't really have an appetite.  No nausea/vomiting or diarrhea. No melena or hematochezia. No abdominal pain.    Feeling detached from conversations , feels like thinking is \"foggy\" the last 6 weeks as well.     Has been under a " lot of stress -  has a mass on his lungs that was diagnosed about 6 weeks ago.  Has had 2 biopsies that have been done and inconclusive. Having bigger surgery coming up.       Had some similar feelings of depression and feelings of emotional detachment previously several years ago when she had a diarrheal illness for several weeks and then a concussion after a fall with post-concussion syndrome.     Falls asleep easily, but then awakens at about 0430 . Some nights she admits to not sleeping past 0130.      After increasing her abilify to 7.5mg from 5mg end of 3/30/2022 - mood seemed to be a bit worse.      Social History     Tobacco Use     Smoking status: Never Smoker     Smokeless tobacco: Never Used   Substance Use Topics     Alcohol use: Yes     Alcohol/week: 0.0 - 1.0 standard drinks     Comment: 2 per month     Drug use: No     Comment: no herbal meds either     PHQ 11/22/2021 1/31/2022 4/25/2022   PHQ-9 Total Score 7 0 16   Q9: Thoughts of better off dead/self-harm past 2 weeks Not at all Not at all Not at all   Some encounter information is confidential and restricted. Go to Review Flowsheets activity to see all data.     TIFFANY-7 SCORE 11/22/2021 1/31/2022 4/25/2022   Total Score - - -   Total Score - 0 (minimal anxiety) 11 (moderate anxiety)   Total Score 3 0 11     Last PHQ-9 4/25/2022   1.  Little interest or pleasure in doing things 1   2.  Feeling down, depressed, or hopeless 1   3.  Trouble falling or staying asleep, or sleeping too much 3   4.  Feeling tired or having little energy 3   5.  Poor appetite or overeating 3   6.  Feeling bad about yourself 0   7.  Trouble concentrating 2   8.  Moving slowly or restless 3   Q9: Thoughts of better off dead/self-harm past 2 weeks 0   PHQ-9 Total Score 16   Difficulty at work, home, or with people -     TIFFANY-7  4/25/2022   1. Feeling nervous, anxious, or on edge 1   2. Not being able to stop or control worrying 2   3. Worrying too much about different  things 2   4. Trouble relaxing 3   5. Being so restless that it is hard to sit still 2   6. Becoming easily annoyed or irritable 0   7. Feeling afraid, as if something awful might happen 1   TIFFANY-7 Total Score 11   If you checked any problems, how difficult have they made it for you to do your work, take care of things at home, or get along with other people? -       Suicide Assessment Five-step Evaluation and Treatment (SAFE-T)    Patient Active Problem List   Diagnosis     Essential hypertension with goal blood pressure less than 140/90     Hidradenitis     Localized osteoarthritis of hand     Synovial cyst of popliteal space     DERMATOPHYTOSIS OF NAIL- toenails      Lipidosis     Osteopenia     Postmenopausal     NUMBNESS AND TINGLING OF FOOT - bilateral -mild      Numbness of feet- distal feet R>L      Stress incontinence, female - mild     Cough     Acute bronchitis- recurrent- ? related to mold in school building- pt has since retired and no further bronchitis     Rosacea     History of migraine headaches- assoc. with nausea/vomiting - resolved around menopause - were  premenstrual      Dupuytren's disease of palm - right 4th digit flexor tendon     Sun-damaged skin     Seasonal allergic rhinitis     Basal cell carcinoma of leg, right     Adjustment disorder with mixed anxiety and depressed mood- mild at this time     Family history of celiac disease     TIFFANY (generalized anxiety disorder)     Insomnia, unspecified type     Chronic constipation     Raynaud's phenomenon without gangrene     Other secondary osteoarthritis of left knee     Primary localized osteoarthrosis of left lower leg     Hyperlipidemia LDL goal <130     Basal cell carcinoma     Colon polyps     Screening for cervical cancer     Adjustment insomnia       Current Outpatient Medications   Medication Sig Dispense Refill     clindamycin (CLINDAGEL) 1 % external gel Apply topically daily 60 g 11     doxylamine (UNISOM) 25 MG TABS tablet Take 25 mg  "by mouth At Bedtime Takes nightly       lactobacillus rhamnosus, GG, (CULTURELL) capsule Take 1 capsule by mouth 2 times daily       lisinopril (ZESTRIL) 2.5 MG tablet Take 1 tablet (2.5 mg) by mouth daily 90 tablet 3     ARIPiprazole (ABILIFY) 5 MG tablet Take 1 tablet (5 mg) by mouth daily 135 tablet 0     escitalopram (LEXAPRO) 20 MG tablet Take 1 tablet (20 mg) by mouth daily 90 tablet 1          Allergies   Allergen Reactions     Fosamax Diarrhea     Diarrhea, stomach cramps, jaw pain      Augmentin Itching and Rash            Review of Systems   Constitutional, HEENT, cardiovascular, pulmonary, GI, , musculoskeletal, neuro, skin, endocrine and psych systems are negative, except as otherwise noted.      Objective    BP (!) 140/80   Pulse 110   Temp 98.2  F (36.8  C)   Ht 1.721 m (5' 7.75\")   Wt 61.7 kg (136 lb)   LMP 06/28/2001   SpO2 99%   BMI 20.83 kg/m    There is no height or weight on file to calculate BMI.  Physical Exam :   GENERAL: healthy, alert and no distress  EYES: Eyes grossly normal to inspection, PERRL and conjunctivae and sclerae normal  HENT: ear canals and TM's normal, nose and mouth without ulcers or lesions  NECK: no adenopathy, no asymmetry, masses, or scars and thyroid normal to palpation  RESP: lungs clear to auscultation - no rales, rhonchi or wheezes  CV: regular rate and rhythm, normal S1 S2, no S3 or S4, no murmur, click or rub, no peripheral edema and peripheral pulses strong  ABDOMEN: soft, nontender, no hepatosplenomegaly, no masses and bowel sounds normal  MS: no gross musculoskeletal defects noted, no edema  SKIN: no suspicious lesions or rashes  NEURO: Normal strength and tone, mentation intact and speech normal  PSYCH: mentation appears normal, affect flat, very intermittently slightly  tearful, mildly fatigued,  judgement and insight mostly intact and appearance well groomed    Office Visit on 01/31/2022   Component Date Value Ref Range Status     Creatinine Urine " mg/dL 01/31/2022 69  mg/dL Final     Albumin Urine mg/L 01/31/2022 6  mg/L Final     Albumin Urine mg/g Cr 01/31/2022 8.70  0.00 - 25.00 mg/g Cr Final     25 OH Vitamin D2 01/31/2022 <5  ug/L Final     25 OH Vitamin D3 01/31/2022 35  ug/L Final     25 OH Vit D Total 01/31/2022 <40  20 - 75 ug/L Final    Season, race, dietary intake, and treatment affect the concentration of 25-hydroxy-Vitamin D. Values may decrease during winter months and increase during summer months. Values 20-29 ug/L may indicate Vitamin D insufficiency and values <20 ug/L may indicate Vitamin D deficiency.     WBC Count 01/31/2022 6.5  4.0 - 11.0 10e3/uL Final     RBC Count 01/31/2022 4.64  3.80 - 5.20 10e6/uL Final     Hemoglobin 01/31/2022 13.8  11.7 - 15.7 g/dL Final     Hematocrit 01/31/2022 42.1  35.0 - 47.0 % Final     MCV 01/31/2022 91  78 - 100 fL Final     MCH 01/31/2022 29.7  26.5 - 33.0 pg Final     MCHC 01/31/2022 32.8  31.5 - 36.5 g/dL Final     RDW 01/31/2022 13.0  10.0 - 15.0 % Final     Platelet Count 01/31/2022 212  150 - 450 10e3/uL Final     Sodium 01/31/2022 140  133 - 144 mmol/L Final     Potassium 01/31/2022 4.1  3.4 - 5.3 mmol/L Final     Chloride 01/31/2022 107  94 - 109 mmol/L Final     Carbon Dioxide (CO2) 01/31/2022 28  20 - 32 mmol/L Final     Anion Gap 01/31/2022 5  3 - 14 mmol/L Final     Urea Nitrogen 01/31/2022 15  7 - 30 mg/dL Final     Creatinine 01/31/2022 0.68  0.52 - 1.04 mg/dL Final     Calcium 01/31/2022 9.5  8.5 - 10.1 mg/dL Final     Glucose 01/31/2022 89  70 - 99 mg/dL Final     Alkaline Phosphatase 01/31/2022 68  40 - 150 U/L Final     AST 01/31/2022 9  0 - 45 U/L Final     ALT 01/31/2022 19  0 - 50 U/L Final     Protein Total 01/31/2022 7.1  6.8 - 8.8 g/dL Final     Albumin 01/31/2022 4.3  3.4 - 5.0 g/dL Final     Bilirubin Total 01/31/2022 0.6  0.2 - 1.3 mg/dL Final     GFR Estimate 01/31/2022 >90  >60 mL/min/1.73m2 Final    Effective December 21, 2021 eGFRcr in adults is calculated using the 2021  CKD-EPI creatinine equation which includes age and gender (Mercedes et al., NEJ, DOI: 10.1056/YPSVsb9373732)     Cholesterol 01/31/2022 208 (A) <200 mg/dL Final     Triglycerides 01/31/2022 81  <150 mg/dL Final     Direct Measure HDL 01/31/2022 82  >=50 mg/dL Final     LDL Cholesterol Calculated 01/31/2022 110 (A) <=100 mg/dL Final     Non HDL Cholesterol 01/31/2022 126  <130 mg/dL Final     Patient Fasting > 8hrs? 01/31/2022 Yes   Final     TSH 01/31/2022 1.53  0.40 - 4.00 mU/L Final            Decrease abilify ( aripiprazole) from 7.5mg to 5mg nightly - down from 1.5 tabs just to 1 tablet nightly.     Have Malini help you with sound machine to help you sleep through night.     Ok for Unisom at bedtime, if still having problems sleeping.     Increase lexapro to 20mg daily.  Consider adding bupropion xr ( wellbutrin XL) once max dose on lexapro is achieved.     Return in about 2 weeks (around 5/9/2022) for depression/anxiety follow up, with Dr. Ibarra, in person.     Await e-consult results.

## 2022-04-26 ENCOUNTER — E-CONSULT (OUTPATIENT)
Dept: BEHAVIORAL HEALTH | Facility: CLINIC | Age: 67
End: 2022-04-26
Payer: COMMERCIAL

## 2022-04-26 DIAGNOSIS — F43.23 ADJUSTMENT DISORDER WITH MIXED ANXIETY AND DEPRESSED MOOD: ICD-10-CM

## 2022-04-26 DIAGNOSIS — F41.1 GAD (GENERALIZED ANXIETY DISORDER): ICD-10-CM

## 2022-04-26 DIAGNOSIS — Z51.81 MEDICATION MONITORING ENCOUNTER: ICD-10-CM

## 2022-04-26 LAB
ALBUMIN SERPL-MCNC: 4 G/DL (ref 3.4–5)
ALP SERPL-CCNC: 64 U/L (ref 40–150)
ALT SERPL W P-5'-P-CCNC: 21 U/L (ref 0–50)
ANION GAP SERPL CALCULATED.3IONS-SCNC: 6 MMOL/L (ref 3–14)
AST SERPL W P-5'-P-CCNC: 11 U/L (ref 0–45)
BILIRUB SERPL-MCNC: 0.4 MG/DL (ref 0.2–1.3)
BUN SERPL-MCNC: 23 MG/DL (ref 7–30)
CALCIUM SERPL-MCNC: 9.3 MG/DL (ref 8.5–10.1)
CHLORIDE BLD-SCNC: 105 MMOL/L (ref 94–109)
CO2 SERPL-SCNC: 26 MMOL/L (ref 20–32)
CREAT SERPL-MCNC: 0.78 MG/DL (ref 0.52–1.04)
GFR SERPL CREATININE-BSD FRML MDRD: 83 ML/MIN/1.73M2
GLUCOSE BLD-MCNC: 104 MG/DL (ref 70–99)
POTASSIUM BLD-SCNC: 4.4 MMOL/L (ref 3.4–5.3)
PROT SERPL-MCNC: 7.1 G/DL (ref 6.8–8.8)
SODIUM SERPL-SCNC: 137 MMOL/L (ref 133–144)
TSH SERPL DL<=0.005 MIU/L-ACNC: 1.3 MU/L (ref 0.4–4)

## 2022-04-26 PROCEDURE — 99451 NTRPROF PH1/NTRNET/EHR 5/>: CPT | Performed by: PSYCHIATRY & NEUROLOGY

## 2022-04-26 RX ORDER — ESCITALOPRAM OXALATE 20 MG/1
20 TABLET ORAL DAILY
Qty: 90 TABLET | Refills: 1 | Status: SHIPPED | OUTPATIENT
Start: 2022-04-26 | End: 2022-05-12 | Stop reason: DRUGHIGH

## 2022-04-26 ASSESSMENT — PATIENT HEALTH QUESTIONNAIRE - PHQ9: SUM OF ALL RESPONSES TO PHQ QUESTIONS 1-9: 16

## 2022-04-26 ASSESSMENT — ANXIETY QUESTIONNAIRES: GAD7 TOTAL SCORE: 11

## 2022-04-26 NOTE — PROGRESS NOTES
"ALL SMARTFIELDS MUST BE COMPLETED FOR PATIENT CARE AND BILLING    4/26/2022     E-Consult has been accepted.    Interprofessional consultation requested by:  Helga Ibarra MD      Clinical Question/Purpose: MY CLINICAL QUESTION IS: medication change to help avoid \"foggy thinking\" and anhedonia with significantly blunted affect. pt with recurrent significant situational depression wtih blunted affect with psychomotor retardation and anxiety after 's dx of lung mass.      Patient assessment and information reviewed: Chart review    Recommendations: Before going into medications, I think it is important for the patient to consider therapy.  There is a clear correlation between her 's diagnosis and the worsening symptoms.  Prior to that, she was doing fairly well.  Therapy can be extremely helpful in situations like this and there are no medication side effects to worry about.  Often we faye with medications during an acute worsening of symptoms that are clearly correlated with the increase stress of a situation, instead of using therapy.  Most likely she will need both.  I also agree with the trial of increasing abilify.  I know that she feel felt like she did a little worse when increasing, but I would be curious if after going back down, if she feels that improved anything.  Abilify is an excellent adjunct for this, so would want to follow up on whether it was the abilify, or just the timing of the continued worsening of symptoms.    I agree with the increase of lexapro.  It is much better to max out a medication than add more meds.  If after around 4 weeks, this has not helped as much as we hoped (and also hopefully in therapy by then to see if this helps as well), then adding another medication makes sense.    I think there are 2 options to consider.  One is wellbutrin as you mentioned in your note.  This is does a good job for motivation and energy.  It is a very good antidepressant.  The " side effects can be increased anxiety, which is something to specifically watch for with this patient.  It can also cause insomnia, jitteriness and feeling shaky.  The XL formulation tends to help with this.  I think with abilify on board, there should be less concern for causing more anxiety, just something to watch for.  Wellbutrin can also lower the seizure threshold.  I did not see any past medical issues that would be a concern for this.  One can start wellbutrin XL at 150 mg and then monitor for efficacy and side effects.  After 4-6 weeks, it can be increased with a max of 450 mg.    Another option could be remeron.  This helps with sleep and usually causes weight gain (an added bonus with the weight loss and appetite loss).  The lower the dose, the more sleepy one is.  It would make sense to start at 7.5 mg at bedtime for a week and then increase to 15 mg.  We will want some of the norepinephrine help at the higher doses.  It may need to be increased above that to 30 mg to really get that different neurotransmitter.  Max dose is 45 mg.     Thank you for the referral.      The recommendations provided in this E-Consult are based on a review of clinical data pertinent to the clinical question presented, without a review of the patient's complete medical record or, the benefit of a comprehensive in-person or virtual patient evaluation. This consultation should not replace the clinical judgement and evaluation of the provider ordering this E-Consult. Any new clinical issues, or changes in patient status since the filing of this E-Consult will need to be taken into account when assessing these recommendations. Please contact me if you have further questions.    My total time spent reviewing clinical information and formulating assessment was 20 minutes.    Report sent automatically to requesting provider once signed.     Jimbo Quiroz MD

## 2022-04-26 NOTE — PROGRESS NOTES
Per pharmacy, insurance doesn't cover two 10 mg tablets. Changed to one 20 mg tablet.     Lacy Virk RN  [facility]

## 2022-04-29 ENCOUNTER — TELEPHONE (OUTPATIENT)
Dept: FAMILY MEDICINE | Facility: CLINIC | Age: 67
End: 2022-04-29
Payer: COMMERCIAL

## 2022-04-29 DIAGNOSIS — F43.23 ADJUSTMENT DISORDER WITH MIXED ANXIETY AND DEPRESSED MOOD: ICD-10-CM

## 2022-04-29 DIAGNOSIS — F51.02 ADJUSTMENT INSOMNIA: ICD-10-CM

## 2022-04-29 RX ORDER — ARIPIPRAZOLE 5 MG/1
5 TABLET ORAL DAILY
Qty: 135 TABLET | Refills: 0
Start: 2022-04-29 | End: 2022-05-24 | Stop reason: DRUGHIGH

## 2022-04-29 NOTE — TELEPHONE ENCOUNTER
See my chart message reply.  Re: e-consult from psychiatry Dr. Jimbo Quiroz - I included the below in that message:     Recommendations: Before going into medications, I think it is important for the patient to consider therapy.  There is a clear correlation between her 's diagnosis and the worsening symptoms.  Prior to that, she was doing fairly well.  Therapy can be extremely helpful in situations like this and there are no medication side effects to worry about.  Often we faye with medications during an acute worsening of symptoms that are clearly correlated with the increase stress of a situation, instead of using therapy.  Most likely she will need both.  I also agree with the trial of increasing abilify.  I know that she feel felt like she did a little worse when increasing, but I would be curious if after going back down, if she feels that improved anything.  Abilify is an excellent adjunct for this, so would want to follow up on whether it was the abilify, or just the timing of the continued worsening of symptoms.     I agree with the increase of lexapro.  It is much better to max out a medication than add more meds.  If after around 4 weeks, this has not helped as much as we hoped (and also hopefully in therapy by then to see if this helps as well), then adding another medication makes sense.     I think there are 2 options to consider.  One is wellbutrin as you mentioned in your note.  This is does a good job for motivation and energy.  It is a very good antidepressant.  The side effects can be increased anxiety, which is something to specifically watch for with this patient.  It can also cause insomnia, jitteriness and feeling shaky.  The XL formulation tends to help with this.  I think with abilify on board, there should be less concern for causing more anxiety, just something to watch for.  Wellbutrin can also lower the seizure threshold.  I did not see any past medical issues that would be a  concern for this.  One can start wellbutrin XL at 150 mg and then monitor for efficacy and side effects.  After 4-6 weeks, it can be increased with a max of 450 mg.     Another option could be remeron.  This helps with sleep and usually causes weight gain (an added bonus with the weight loss and appetite loss).  The lower the dose, the more sleepy one is.  It would make sense to start at 7.5 mg at bedtime for a week and then increase to 15 mg.  We will want some of the norepinephrine help at the higher doses.  It may need to be increased above that to 30 mg to really get that different neurotransmitter.  Max dose is 45 mg.

## 2022-05-03 LAB
DEPRECATED CALCIDIOL+CALCIFEROL SERPL-MC: <38 UG/L (ref 20–75)
VITAMIN D2 SERPL-MCNC: <5 UG/L
VITAMIN D3 SERPL-MCNC: 33 UG/L

## 2022-05-09 ENCOUNTER — OFFICE VISIT (OUTPATIENT)
Dept: FAMILY MEDICINE | Facility: CLINIC | Age: 67
End: 2022-05-09
Payer: COMMERCIAL

## 2022-05-09 VITALS
HEIGHT: 68 IN | DIASTOLIC BLOOD PRESSURE: 82 MMHG | HEART RATE: 102 BPM | TEMPERATURE: 98.5 F | BODY MASS INDEX: 20.61 KG/M2 | OXYGEN SATURATION: 99 % | WEIGHT: 136 LBS | SYSTOLIC BLOOD PRESSURE: 138 MMHG

## 2022-05-09 DIAGNOSIS — F43.23 ADJUSTMENT DISORDER WITH MIXED ANXIETY AND DEPRESSED MOOD: Primary | ICD-10-CM

## 2022-05-09 DIAGNOSIS — F51.02 ADJUSTMENT INSOMNIA: ICD-10-CM

## 2022-05-09 PROCEDURE — 99214 OFFICE O/P EST MOD 30 MIN: CPT | Performed by: FAMILY MEDICINE

## 2022-05-09 ASSESSMENT — ANXIETY QUESTIONNAIRES
3. WORRYING TOO MUCH ABOUT DIFFERENT THINGS: MORE THAN HALF THE DAYS
2. NOT BEING ABLE TO STOP OR CONTROL WORRYING: MORE THAN HALF THE DAYS
5. BEING SO RESTLESS THAT IT IS HARD TO SIT STILL: SEVERAL DAYS
1. FEELING NERVOUS, ANXIOUS, OR ON EDGE: MORE THAN HALF THE DAYS
7. FEELING AFRAID AS IF SOMETHING AWFUL MIGHT HAPPEN: NOT AT ALL
IF YOU CHECKED OFF ANY PROBLEMS ON THIS QUESTIONNAIRE, HOW DIFFICULT HAVE THESE PROBLEMS MADE IT FOR YOU TO DO YOUR WORK, TAKE CARE OF THINGS AT HOME, OR GET ALONG WITH OTHER PEOPLE: SOMEWHAT DIFFICULT
6. BECOMING EASILY ANNOYED OR IRRITABLE: NOT AT ALL
GAD7 TOTAL SCORE: 8

## 2022-05-09 ASSESSMENT — PATIENT HEALTH QUESTIONNAIRE - PHQ9
5. POOR APPETITE OR OVEREATING: SEVERAL DAYS
SUM OF ALL RESPONSES TO PHQ QUESTIONS 1-9: 12

## 2022-05-09 NOTE — PATIENT INSTRUCTIONS
" [facility]  [address] [place] [address]  Office: [phone]   Fax:    [phone]     If you awaken during the night, don't look at your clock or your phone.      \" I'm Happy, I'm Healthy, I'm Blessed and I'm Strong. \"  - It's time to fall asleep now. When I awaken I will awaken feeling rested.     Continue your current medications unless psychiatry tells you differently on 5/12/2022.      Return in about 2 weeks (around 5/23/2022) for depression/anxiety follow up, w/ Dr. SUE for 40 minute appointment, in person.     Thank you so much or choosing [facility]  for your Health Care. It was a pleasure seeing you at your visit today! Please contact us with any questions or concerns you may have.                   Helga Ibarra MD                              To reach your [facility] care team after hours call:   [phone]    PLEASE NOTE OUR HOURS HAVE CHANGED secondary to COVID-19 coronavirus pandemic, as we are trying to minimize patient exposure to the virus,  which is now widespread in the Pending [facility] to [facility].  These hours may change with very little notice.  We apologize for any inconvenience.       Our current clinic hours are:          Monday- Thursday   7:00am - 6:00pm  in person.      Friday  7:00am- 5:00pm                       Saturday and Sunday : Closed to in person and virtual visits        We have telephone and virtual visit times available between    7:00am - 6pm on Monday-Friday as well.                                                Phone:  [phone]      Our pharmacy hours: Monday through Friday 8:00am to 5:00pm                        Saturday - 9:00 am to 12 noon       Sunday : Closed.              Phone:  [phone]              ###  Please note: at this time we are not accepting any walk-in visits. ###      There is also information available at our web site:  [email or web address]    If your provider " ordered any lab tests and you do not receive the results within 10 business days, please call the clinic.    If you need a medication refill please contact your pharmacy.  Please allow 3 business days for your refill to be completed.    Our clinic offers telephone visits and e visits.  Please ask one of your team members to explain more.      Use [facility] (secure email communication and access to your chart) to send your primary care provider a message or make an appointment. Ask someone on your Team how to sign up for [facility].

## 2022-05-09 NOTE — PROGRESS NOTES
Assessment & Plan       ICD-10-CM    1. Adjustment disorder with mixed anxiety and depressed mood- mild-moderate  at this time  F43.23    2. Adjustment insomnia  F51.02      Continue Increased lexapro at 20mg daily. Await psychiatry's recommendations on 5/12/2022.  Continue to spend time  With grandchildren, with fresh air, exercise and being out of doors. .  Pt considering and may be open to doing some counseling in the near future - she's thinking about it and we discussed the benefits of that again today in detail.   Future Appointments 5/15/2022 - 11/11/2022              Date Visit Type Length Department Provider     5/23/2022  3:40 PM OFFICE VISIT 40 min [facility] Helga Ibarra MD              5/24/2022 10:30 AM [facility] RETURN 30 min [facility] Young, Kenneth Ray, PsyD              5/24/2022 11:00 AM [facility] ADULT PSYCHI[facility]RY RETURN 30 min [facility] Judy Pompa DO    Location Instructions:     [facility] has 2 buildings on its campus. Please visit us at [address] [place] [address] and enter the building with the numbers [id number] on it.               8/5/2022 10:45 AM RETURN 15 min [facility] Ligia Carbajal, PAPawelC    Location Instructions:     [address] [place] [id number]                  Prescription drug management  44 minutes spent on the date of the encounter doing chart review, history and exam, documentation and further activities per the note       Depression Screening Follow Up    PHQ 5/9/2022   PHQ-9 Total Score 12   Q9: Thoughts of better off dead/self-harm past 2 weeks Not at all     Last PHQ-9 5/9/2022   1.  Little interest or pleasure in doing things 1   2.  Feeling down, depressed, or hopeless 0   3.  Trouble falling or staying asleep, or sleeping too much 1   4.  Feeling tired or having little energy 3   5.  Poor appetite or overeating 2   6.  Feeling bad about yourself 0   7.  Trouble concentrating 2   8.  Moving  slowly or restless 3   Q9: Thoughts of better off dead/self-harm past 2 weeks 0   PHQ-9 Total Score 12   Difficulty at work, home, or with people Somewhat difficult       Follow Up Actions Taken  Crisis resource information provided in After Visit Summary  Mental Health Referral placed  see below and above.      MEDICATIONS:        - Continue other medications without change  Regular exercise  See Patient Instructions    Return in about 2 weeks (around 5/23/2022) for depression/anxiety follow up, w/ Dr. SUE for 40 minute appointment, in person.    Helga Ibarra MD  [facility]    Chanelle Tristan is a 66 year old who presents for the following health issues :   1. Adjustment disorder with mixed anxiety and depressed mood- mild-moderate  at this time    2. Adjustment insomnia          HPI     Depression and Anxiety Follow-Up:     How are you doing with your depression since your last visit? No change    How are you doing with your anxiety since your last visit?  Improved     Are you having other symptoms that might be associated with depression or anxiety? No    Have you had a significant life event? OTHER: 's cancer dx and treatment      Do you have any concerns with your use of alcohol or other drugs? No     She moved into another bedroom with rain noises on Radha and her Calm Cristhian.       Last visit on 4/26/2022 - we decreased her abilify from 7.5mg to 5mg daily and increased her lexapro from 10mg to 20mg.         Last week she went for a walk with her friend Patty and plans to go tomorrow again also.   Daughter Malini offered  to take her to yoga on Wednesday.      The mass in 's lungs has shrunk by 20% and surgery has been postponed and he is feeling much better and his cough is gone.  Is falling asleep well, but sometimes still awakens during the night.     She has an appointment for therapy and with psychiatry on 5/12/2022 - daughter will help her with virtual appt  set ups.      Future Appointments 5/9/2022 - 11/5/2022              Date Visit Type Length Department Provider     5/12/2022  3:00 PM [facility] NEW 30 min [facility] YoungAnthony PsyD              5/12/2022  3:30 PM [facility] ADULT PSYCHIATRY NEW 60 min [facility] Judy Pompa DO    Location Instructions:     [facility] has 2 buildings on its campus. Please visit us at [address] [place] [address] and enter the building with the numbers [id number] on it.               8/5/2022 10:45 AM RETURN 15 min [facility] DERMATOLOG[facility] Ligia Carbajal, PAPawelC    Location Instructions:     [address] [place] [phone]                        Social History     Tobacco Use     Smoking status: Never Smoker     Smokeless tobacco: Never Used   Substance Use Topics     Alcohol use: Yes     Alcohol/week: 0.0 - 1.0 standard drinks     Comment: 2 per month     Drug use: No     Comment: no herbal meds either     PHQ 11/22/2021 1/31/2022 4/25/2022   PHQ-9 Total Score 7 0 16   Q9: Thoughts of better off dead/self-harm past 2 weeks Not at all Not at all Not at all     TIFFANY-7 SCORE 11/22/2021 1/31/2022 4/25/2022   Total Score - - -   Total Score - 0 (minimal anxiety) 11 (moderate anxiety)   Total Score 3 0 11     Last PHQ-9 5/9/2022   1.  Little interest or pleasure in doing things 1   2.  Feeling down, depressed, or hopeless 0   3.  Trouble falling or staying asleep, or sleeping too much 1   4.  Feeling tired or having little energy 3   5.  Poor appetite or overeating 2   6.  Feeling bad about yourself 0   7.  Trouble concentrating 2   8.  Moving slowly or restless 3   Q9: Thoughts of better off dead/self-harm past 2 weeks 0   PHQ-9 Total Score 12   Difficulty at work, home, or with people Somewhat difficult     TIFFANY-7  5/12/2022   1. Feeling nervous, anxious, or on edge 1   2. Not being able to stop or control worrying 1   3. Worrying too much about different things 1   4. Trouble relaxing 0   5. Being  so restless that it is hard to sit still 3   6. Becoming easily annoyed or irritable 0   7. Feeling afraid, as if something awful might happen 0   TIFFANY-7 Total Score 6   If you checked any problems, how difficult have they made it for you to do your work, take care of things at home, or get along with other people? -       Suicide Assessment Five-step Evaluation and Treatment (SAFE-T)          Patient Active Problem List   Diagnosis     Essential hypertension with goal blood pressure less than 140/90     Hidradenitis     Localized osteoarthritis of hand     Synovial cyst of popliteal space     DERMATOPHYTOSIS OF NAIL- toenails      Lipidosis     Osteopenia     Postmenopausal     NUMBNESS AND TINGLING OF FOOT - bilateral -mild      Numbness of feet- distal feet R>L      Stress incontinence, female - mild     Cough     Acute bronchitis- recurrent- ? related to mold in school building- pt has since retired and no further bronchitis     Rosacea     History of migraine headaches- assoc. with nausea/vomiting - resolved around menopause - were  premenstrual      Dupuytren's disease of palm - right 4th digit flexor tendon     Sun-damaged skin     Seasonal allergic rhinitis     Basal cell carcinoma of leg, right     Adjustment disorder with mixed anxiety and depressed mood- mild-moderate  at this time     Family history of celiac disease     TIFFANY (generalized anxiety disorder)     Insomnia, unspecified type     Chronic constipation     Raynaud's phenomenon without gangrene     Other secondary osteoarthritis of left knee     Primary localized osteoarthrosis of left lower leg     Hyperlipidemia LDL goal <130     Basal cell carcinoma     Colon polyps     Screening for cervical cancer     Adjustment insomnia       Current Outpatient Medications   Medication Sig Dispense Refill     ARIPiprazole (ABILIFY) 5 MG tablet Take 1 tablet (5 mg) by mouth daily 135 tablet 0     clindamycin (CLINDAGEL) 1 % external gel Apply topically daily  "60 g 11     doxylamine (UNISOM) 25 MG TABS tablet Take 25 mg by mouth At Bedtime Takes nightly       lactobacillus rhamnosus, GG, (CULTURELL) capsule Take 1 capsule by mouth 2 times daily       lisinopril (ZESTRIL) 2.5 MG tablet Take 1 tablet (2.5 mg) by mouth daily 90 tablet 3     escitalopram (LEXAPRO) 10 MG tablet Take one tab (10 mg) by mouth WITH 5 mg tab for total dose of 15 mg daily. 30 tablet 0     escitalopram (LEXAPRO) 5 MG tablet Take one tab (5 mg) by mouth WITH 10 mg tab for total dose of 15 mg daily. 30 tablet 0          Allergies   Allergen Reactions     Fosamax Diarrhea     Diarrhea, stomach cramps, jaw pain      Augmentin Itching and Rash       Review of Systems :   Constitutional, HEENT, cardiovascular, pulmonary, GI, , musculoskeletal, neuro, skin, endocrine and psych systems are negative, except as otherwise noted.      Objective    /82 (BP Location: Left arm, Patient Position: Sitting, Cuff Size: Adult Regular)   Pulse 102   Temp 98.5  F (36.9  C)   Ht 1.721 m (5' 7.75\")   Wt 61.7 kg (136 lb)   LMP 06/28/2001   SpO2 99%   BMI 20.83 kg/m    Body mass index is 20.83 kg/m .  Physical Exam   GENERAL: healthy, alert and no distress  EYES: Eyes grossly normal to inspection, PERRL and conjunctivae and sclerae normal  NECK: no adenopathy, no asymmetry, masses, or scars   RESP: lungs clear to auscultation - no rales, rhonchi or wheezes  CV: regular rate and rhythm, normal S1 S2, no S3 or S4, no murmur, click or rub, no peripheral edema and peripheral pulses strong  MS: no gross musculoskeletal defects noted, no edema  NEURO: Normal strength and tone, mentation intact and speech normal  PSYCH: mentation appears still a bit slow over baseline, affect still depressed, but very slightly brighter than 2 weeks ago, smiling now a bit more than previous and making a bit better eye contact than last visit. Well groomed and casually dressed.  Seems a bit better rested.     Office Visit on 04/25/2022 "   Component Date Value Ref Range Status     Sodium 04/25/2022 137  133 - 144 mmol/L Final     Potassium 04/25/2022 4.4  3.4 - 5.3 mmol/L Final     Chloride 04/25/2022 105  94 - 109 mmol/L Final     Carbon Dioxide (CO2) 04/25/2022 26  20 - 32 mmol/L Final     Anion Gap 04/25/2022 6  3 - 14 mmol/L Final     Urea Nitrogen 04/25/2022 23  7 - 30 mg/dL Final     Creatinine 04/25/2022 0.78  0.52 - 1.04 mg/dL Final     Calcium 04/25/2022 9.3  8.5 - 10.1 mg/dL Final     Glucose 04/25/2022 104 (A) 70 - 99 mg/dL Final     Alkaline Phosphatase 04/25/2022 64  40 - 150 U/L Final     AST 04/25/2022 11  0 - 45 U/L Final     ALT 04/25/2022 21  0 - 50 U/L Final     Protein Total 04/25/2022 7.1  6.8 - 8.8 g/dL Final     Albumin 04/25/2022 4.0  3.4 - 5.0 g/dL Final     Bilirubin Total 04/25/2022 0.4  0.2 - 1.3 mg/dL Final     GFR Estimate 04/25/2022 83  >60 mL/min/1.73m2 Final    Effective December 21, 2021 eGFRcr in adults is calculated using the 2021 CKD-EPI creatinine equation which includes age and gender (Mercedes et al., NE, DOI: 10.1056/IOQNwx7459129)     TSH 04/25/2022 1.30  0.40 - 4.00 mU/L Final     25 [place] Vitamin D2 04/25/2022 <5  ug/L Final     25 OH Vitamin D3 04/25/2022 33  ug/L Final     25 OH Vit D Total 04/25/2022 <38  20 - 75 ug/L Final    Season, race, dietary intake, and treatment affect the concentration of 25-hydroxy-Vitamin D. Values may decrease during winter months and increase during summer months. Values 20-29 ug/L may indicate Vitamin D insufficiency and values <20 ug/L may indicate Vitamin D deficiency.     WBC Count 04/25/2022 8.1  4.0 - 11.0 10e3/uL Final     RBC Count 04/25/2022 4.58  3.80 - 5.20 10e6/uL Final     Hemoglobin 04/25/2022 13.6  11.7 - 15.7 g/dL Final     Hematocrit 04/25/2022 41.1  35.0 - 47.0 % Final     MCV 04/25/2022 90  78 - 100 fL Final     MCH 04/25/2022 29.7  26.5 - 33.0 pg Final     MCHC 04/25/2022 33.1  31.5 - 36.5 g/dL Final     RDW 04/25/2022 12.9  10.0 - 15.0 % Final     Platelet  Count 04/25/2022 252  150 - 450 10e3/uL Final     % Neutrophils 04/25/2022 72  % Final     % Lymphocytes 04/25/2022 17  % Final     % Monocytes 04/25/2022 8  % Final     % Eosinophils 04/25/2022 2  % Final     % Basophils 04/25/2022 1  % Final     % Immature Granulocytes 04/25/2022 0  % Final     Absolute Neutrophils 04/25/2022 5.9  1.6 - 8.3 10e3/uL Final     Absolute Lymphocytes 04/25/2022 1.4  0.8 - 5.3 10e3/uL Final     Absolute Monocytes 04/25/2022 0.6  0.0 - 1.3 10e3/uL Final     Absolute Eosinophils 04/25/2022 0.2  0.0 - 0.7 10e3/uL Final     Absolute Basophils 04/25/2022 0.1  0.0 - 0.2 10e3/uL Final     Absolute Immature Granulocytes 04/25/2022 0.0  <=0.4 10e3/uL Final

## 2022-05-10 ASSESSMENT — ANXIETY QUESTIONNAIRES: GAD7 TOTAL SCORE: 8

## 2022-05-12 ENCOUNTER — VIRTUAL VISIT (OUTPATIENT)
Dept: PSYCHOLOGY | Facility: CLINIC | Age: 67
End: 2022-05-12
Payer: COMMERCIAL

## 2022-05-12 ENCOUNTER — VIRTUAL VISIT (OUTPATIENT)
Dept: PSYCHIATRY | Facility: CLINIC | Age: 67
End: 2022-05-12
Attending: FAMILY MEDICINE
Payer: COMMERCIAL

## 2022-05-12 DIAGNOSIS — F41.1 GAD (GENERALIZED ANXIETY DISORDER): Primary | ICD-10-CM

## 2022-05-12 DIAGNOSIS — G47.00 INSOMNIA, UNSPECIFIED TYPE: ICD-10-CM

## 2022-05-12 PROCEDURE — 90791 PSYCH DIAGNOSTIC EVALUATION: CPT | Mod: 95 | Performed by: PSYCHOLOGIST

## 2022-05-12 PROCEDURE — 99204 OFFICE O/P NEW MOD 45 MIN: CPT | Mod: 95 | Performed by: PSYCHIATRY & NEUROLOGY

## 2022-05-12 RX ORDER — ESCITALOPRAM OXALATE 10 MG/1
TABLET ORAL
Qty: 30 TABLET | Refills: 0 | Status: SHIPPED | OUTPATIENT
Start: 2022-05-12 | End: 2022-06-23

## 2022-05-12 RX ORDER — ESCITALOPRAM OXALATE 5 MG/1
TABLET ORAL
Qty: 30 TABLET | Refills: 0 | Status: SHIPPED | OUTPATIENT
Start: 2022-05-12 | End: 2022-06-23

## 2022-05-12 ASSESSMENT — ANXIETY QUESTIONNAIRES
GAD7 TOTAL SCORE: 6
GAD7 TOTAL SCORE: 6
7. FEELING AFRAID AS IF SOMETHING AWFUL MIGHT HAPPEN: NOT AT ALL
2. NOT BEING ABLE TO STOP OR CONTROL WORRYING: SEVERAL DAYS
GAD7 TOTAL SCORE: 6
1. FEELING NERVOUS, ANXIOUS, OR ON EDGE: SEVERAL DAYS
8. IF YOU CHECKED OFF ANY PROBLEMS, HOW DIFFICULT HAVE THESE MADE IT FOR YOU TO DO YOUR WORK, TAKE CARE OF THINGS AT HOME, OR GET ALONG WITH OTHER PEOPLE?: SOMEWHAT DIFFICULT
7. FEELING AFRAID AS IF SOMETHING AWFUL MIGHT HAPPEN: NOT AT ALL
5. BEING SO RESTLESS THAT IT IS HARD TO SIT STILL: NEARLY EVERY DAY
4. TROUBLE RELAXING: NOT AT ALL
6. BECOMING EASILY ANNOYED OR IRRITABLE: NOT AT ALL
3. WORRYING TOO MUCH ABOUT DIFFERENT THINGS: SEVERAL DAYS

## 2022-05-12 ASSESSMENT — COLUMBIA-SUICIDE SEVERITY RATING SCALE - C-SSRS
1. HAVE YOU WISHED YOU WERE DEAD OR WISHED YOU COULD GO TO SLEEP AND NOT WAKE UP?: NO
ATTEMPT LIFETIME: NO
TOTAL  NUMBER OF ABORTED OR SELF INTERRUPTED ATTEMPTS LIFETIME: NO
2. HAVE YOU ACTUALLY HAD ANY THOUGHTS OF KILLING YOURSELF?: NO
TOTAL  NUMBER OF INTERRUPTED ATTEMPTS LIFETIME: NO
6. HAVE YOU EVER DONE ANYTHING, STARTED TO DO ANYTHING, OR PREPARED TO DO ANYTHING TO END YOUR LIFE?: NO

## 2022-05-12 NOTE — PATIENT INSTRUCTIONS
Treatment Plan:  Continue Abilify/aripiprazole 5 mg daily to augment Lexapro.  Decrease Lexapro/excitalopram to 15 mg daily for anxiety, mood and to see if some of what you have been experiencing as possible side effects improves.    Consider individual psychotherapy for additional support and ongoing development of nonpharmacologic coping skills and strategies.  Continue all other cares per primary care provider.   Continue all other medications as reviewed per electronic medical record today.   Safety plan reviewed. To the Emergency Department as needed or call after hours crisis line at [phone] or [phone]. [place] Crisis Text Line: Text [place] to 504158  or  Suicide LifeLine Chat: suicidepreBiogazelleline.org/chat  Schedule an appointment with me in 2 weeks or sooner as needed.  Call [facility] at [phone] to schedule.  Follow up with primary care provider as planned or sooner if needed for acute medical concerns.  Call the psychiatric nurse line with medication questions or concerns at [phone].  [employer] may be used to communicate with your provider, but this is not intended to be used for emergencies.    Patient Education:  Get Out of Your Mind & Into Your Life   By Grupo Lacy    The Happiness Trap: How to Stop Struggling and Start Living  By William Colindres    The Reality Slap: Finding Peace & Fulfillment When Life Hurts  By William Colindres    Things Might Go Terribly, Horribly Wrong: A Guide to Life Liberated from Anxiety  By Nakia Acevedo, PhD    Stress Less, Live More: How Acceptance and Commitment Therapy Can Help You Live a Busy Yet Balanced Life  By Jimbo Diamond    Finding Life Beyond Trauma: Using Acceptance and Commitment Therapy to Heal From Post-Traumatic Stress and Trauma-Related Problems  By Yesenia Bai and Jenna Howell    Other ACT Skills References     William Colindres on YouTube - Demonstrates several different skills to deal with difficult thoughts  "and feelings     Book:  \"A Liberated Mind: How to Pivot Toward What Matters\" by Grupo Lacy     Psychological flexibility: How love turns pain into purpose  Tedx Talk by Dr. Lacy discussing his struggle with anxiety and panic disorder which motivated him to develop ACT therapy (Acceptance and Commitment Therapy)     You Are Not Your Thoughts    Community Resources:    National Suicide Prevention Lifeline: 907.505.2595 (TTY: 811.813.7555). Call anytime for help.  (www.suicidepreventionlifeline.org)  National [place] [employer] Mental [employer]ness (www.bijan.org): 832.245.8374 or 916-408-6223.   Mental Health Association (www.mentalhealth.org): 580.597.4733 or 947-760-6288.  [place] Crisis Text Line: Text [place] to 251203  Suicide LifeLine Chat: suicidepre"[email or web address]line.org/chat  "

## 2022-05-12 NOTE — Clinical Note
Just FYI. No action needed.   Judy Pompa, DO Collaborative Care Psychiatry [facility]

## 2022-05-12 NOTE — PROGRESS NOTES
"[facility] Psychiatry Service  Provider Name:  Eran Landon     Credentials:  RONNIE Enrique    PATIENT'S NAME: Jolynn Haji  PREFERRED NAME: Azalea  PRONOUNS: she/her/hers     MRN: [id number]  : 1955  ADDRESS: [address] [place] [address]  ACCT. NUMBER:  [id number]  DATE OF SERVICE: 22  START TIME: 03:00pm  END TIME: 03:40pm  PREFERRED PHONE: [phone]  May we leave a program related message: Yes  SERVICE MODALITY:  Video Visit:      Provider verified identity through the following two step process.  Patient provided:  Patient     Telemedicine Visit: The patient's condition can be safely assessed and treated via synchronous audio and visual telemedicine encounter.      Reason for Telemedicine Visit: Services only offered telehealth    Originating Site (Patient Location): Patient's home    Distant Site (Provider Location): Provider Remote Setting- Home Office    Consent:  The patient/guardian has verbally consented to: the potential risks and benefits of telemedicine (video visit) versus in person care; bill my insurance or make self-payment for services provided; and responsibility for payment of non-covered services.     Patient would like the video invitation sent by:  My Chart    Mode of Communication:  Video Conference via Amwell    As the provider I attest to compliance with applicable laws and regulations related to telemedicine.    UNIVERSAL ADULT Mental Health DIAGNOSTIC ASSESSMENT    Identifying Information:  Patient is a 66 year old,   .  The pronoun use throughout this assessment reflects the patient's chosen pronoun.  Patient was referred for an assessment by  primary care clinic.  Patient attended the session alone.    Chief Complaint:   The reason for seeking services at this time is: \"Anxiety and depression\".    First appointment with patient in [facility] and was advised of the short-term, team based structure of the model including role of [facility] " "and provider. Patient indicated understanding of the model and agreed to proceed with services as described.    \"I'm hoping we can figure out what triggered this setback.\" She was diagnosed with anxiety and depression after a concussion a concussion in 2017. She had been sick at the time, got up one night and fell. She is uncertain where on her head she landed. She was on Lexapro, mirtazapine, and seroquel. She thought that it was \"fixed\", stopped taking the mirtazapine and seroquel but stayed on the lexapro. In January, started feeling more anxiety. PCP added Abilify which was helpful. The Lexapro was increased which she is not sure if it has been helpful (been on it for 4 weeks). She said, \"I feel like I'm in la-la land. I have a hard time thinking things through.\" Her anxiety and sleep is better but bothered by the slowed thinking.     The problem(s) began 2022.    Patient has attempted to resolve these concerns in the past through medications.    Social/Family History:  Patient reported they grew up in [place], [place].  They were raised by biological parents  . She had 1 younger brother and 1 younger sister. Parents were always together.  Patient reported that their childhood was \"my dad worked at  and mom stayed at home. I had a wonderful childhood.\" Patient described their current relationships with family of origin as: both parents are , and she has good relationship with her siblings.     The patient describes their cultural background as .  Cultural influences and impact on patient's life structure, values, norms, and healthcare: [place].  Contextual influences on patient's health include: Family Factors feels she is not being the grandmother she desires to be.    These factors will be addressed in the Preliminary Treatment plan. Patient identified their preferred language to be English. Patient reported they does not need the assistance of an  or other support " involved in therapy.     Patient reported had no significant delays in developmental tasks.   Patient's highest education level was graduate school  earning a masters in education.  Patient identified the following learning problems: none reported.  Modifications will not be used to assist communication in therapy. Patient reports they are not  able to understand written materials.    Patient reported the following relationship history:  Patient's current relationship status is  for 43 years.   Patient identified their sexual orientation as heterosexual.  Patient reported having 2 child(mateo), a daughter and son. Patient identified partner; friends as part of their support system.  Patient identified the quality of these relationships as stable and meaningful.      Patient's current living/housing situation involves staying in own home/apartment.  The immediate members of family and household include Zach, Allan, and they report that housing is stable.    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  Patient reports their finances are obtained through other. Patient does not identify finances as a current stressor.      Patient reported that they have not been involved with the legal system. Patient does not report being under probation/ parole/ jurisdiction. They are not under any current court jurisdiction. .    Patient's Strengths and Limitations:  Patient identified the following strengths or resources that will help them succeed in treatment: Buddhist / Taoist, commitment to health and well being, umm / spirituality, friends / good social support, family support, insight, intelligence, motivation, sense of humor, strong social skills and work ethic. Things that may interfere with the patient's success in treatment include: physical health concerns.     Assessments:  PHQ2:   PHQ-2 ( 1999 [employer]) 4/25/2022 4/25/2022 1/31/2022 1/31/2022 1/13/2021 1/13/2021 1/10/2021   Q1: Little  interest or pleasure in doing things - - 0 0 0 0 0   Q2: Feeling down, depressed or hopeless - - 0 0 0 0 0   PHQ-2 Score - - 0 0 0 0 0   PHQ-2 Total Score (12-17 Years)- Positive if 3 or more points; Administer PHQ-A if positive - - - - 0 0 0   Q1: Little interest or pleasure in doing things - - Not at all - - - Not at all   Q2: Feeling down, depressed or hopeless - - Not at all - - - Not at all   PHQ-2 Score Incomplete Incomplete 0 - - - 0     PHQ9:   PHQ-9 SCORE 10/25/2018 10/10/2019 1/13/2021 11/22/2021 1/31/2022 4/25/2022 5/9/2022   PHQ-9 Total Score - - - - - - -   PHQ-9 Total Score MyChart - - - - 0 16 (Moderately severe depression) -   PHQ-9 Total Score 0 0 0 7 0 16 12     GAD2: No flowsheet data found.  GAD7:   TIFFANY-7 SCORE 10/10/2019 1/13/2021 1/13/2021 11/22/2021 1/31/2022 4/25/2022 5/9/2022   Total Score - - - - - - -   Total Score - - - - 0 (minimal anxiety) 11 (moderate anxiety) -   Total Score 0 0 0 3 0 11 8     CAGE-AID:   CAGE-AID Total Score 6/6/2017 5/12/2022 5/12/2022   Total Score 0 0 0   Total Score MyChart - - 0 (A total score of 2 or greater is considered clinically significant)     PROMIS 10-Global Health (all questions and answers displayed):   PROMIS 10 5/12/2022 5/12/2022   In general, would you say your health is: - Good   In general, would you say your quality of life is: - Poor   In general, how would you rate your physical health? - Excellent   In general, how would you rate your mental health, including your mood and your ability to think? - Fair   In general, how would you rate your satisfaction with your social activities and relationships? - Good   In general, please rate how well you carry out your usual social activities and roles - Fair   To what extent are you able to carry out your everyday physical activities such as walking, climbing stairs, carrying groceries, or moving a chair? - Mostly   How often have you been bothered by emotional problems such as feeling anxious,  depressed or irritable? - Sometimes   How would you rate your fatigue on average? - Moderate   How would you rate your pain on average?   0 = No Pain  to  10 = Worst Imaginable Pain - 0   In general, would you say your health is: 3 3   In general, would you say your quality of life is: 1 1   In general, how would you rate your physical health? 5 5   In general, how would you rate your mental health, including your mood and your ability to think? 2 2   In general, how would you rate your satisfaction with your social activities and relationships? 3 3   In general, please rate how well you carry out your usual social activities and roles. (This includes activities at home, at work and in your community, and responsibilities as a parent, child, spouse, employee, friend, etc.) 2 2   To what extent are you able to carry out your everyday physical activities such as walking, climbing stairs, carrying groceries, or moving a chair? 4 4   In the past 7 days, how often have you been bothered by emotional problems such as feeling anxious, depressed, or irritable? 3 3   In the past 7 days, how would you rate your fatigue on average? 3 3   In the past 7 days, how would you rate your pain on average, where 0 means no pain, and 10 means worst imaginable pain? 0 0   Global Mental Health Score 9 9   Global Physical Health Score 17 17   PROMIS TOTAL - SUBSCORES 26 26   Some recent data might be hidden     PROMIS 10-Global Health (only subscores and total score):   PROMIS-10 Scores Only 5/12/2022 5/12/2022   Global Mental Health Score 9 9   Global Physical Health Score 17 17   PROMIS TOTAL - SUBSCORES 26 26     [place] Suicide Severity Rating Scale (Lifetime/Recent)  [place] Suicide Severity Rating (Lifetime/Recent) 5/12/2022   1. Wish to be Dead (Lifetime) 0   2. Non-Specific Active Suicidal Thoughts (Lifetime) 0   Actual Attempt (Lifetime) 0   Has subject engaged in non-suicidal self-injurious behavior? (Lifetime) 0   Interrupted  Attempts (Lifetime) 0   Aborted or Self-Interrupted Attempt (Lifetime) 0   Preparatory Acts or Behavior (Lifetime) 0   Calculated C-SSRS Risk Score (Lifetime/Recent) No Risk Indicated       Personal and Family Medical History:  Patient does report a family history of mental health concerns.  Patient reports family history includes Blood Disease in her mother; Cancer in her maternal uncle and maternal uncle; Diabetes in her daughter, mother, and son; Heart Disease (age of onset: 62) in her father; Hypertension in her father and mother; Thyroid Disease in her mother..     Patient does report Mental Health Diagnosis and/or Treatment.  Patient Patient reported the following previous diagnoses which include(s): an anxiety disorder; depression .  Patient reported symptoms began in 2017 after concussion.  Patient has received mental health services in the past:  other Medication from primary doctor.  Psychiatric Hospitalizations: none when   ,  ,  ,  ,  ,  ,  ,  ,  ,  ,  .  Patient denies a history of civil commitment.  Currently, patient none  receiving other mental health services.  These include none.         Patient has had a physical exam to rule out medical causes for current symptoms.  Date of last physical exam was within the past year. Client was encouraged to follow up with PCP if symptoms were to develop. The patient has a [facility] Primary Care Provider, who is named Helga Ibarra.  Patient reports no current medical concerns.  Patient denies any issues with pain..   There are not significant appetite / nutritional concerns / weight changes.   Patient does report a history of head injury / trauma / cognitive impairment.  04/2017 concussion    Patient reports current meds as:   Outpatient Medications Marked as Taking for the 5/12/22 encounter (Virtual Visit) with Anthony Landon PsyD   Medication Sig     ARIPiprazole (ABILIFY) 5 MG tablet Take 1 tablet (5 mg) by mouth daily     clindamycin  "(CLINDAGEL) 1 % external gel Apply topically daily     doxylamine (UNISOM) 25 MG TABS tablet Take 25 mg by mouth At Bedtime Takes nightly     escitalopram (LEXAPRO) 20 MG tablet Take 1 tablet (20 mg) by mouth daily     lactobacillus rhamnosus, GG, (CULTURELL) capsule Take 1 capsule by mouth 2 times daily     lisinopril (ZESTRIL) 2.5 MG tablet Take 1 tablet (2.5 mg) by mouth daily       Medication Adherence:  Patient reports taking.  taking prescribed medications as prescribed.    Patient Allergies:    Allergies   Allergen Reactions     Fosamax Diarrhea     Diarrhea, stomach cramps, jaw pain      Augmentin Itching and Rash       Medical History:    Past Medical History:   Diagnosis Date     Abnormal glandular Papanicolaou smear of cervix- ASCUS in 2000 12/5/2003    ASC H- 2000     Basal cell carcinoma of leg, right 05/2016    Dr Rogers     Colon polyps 05/2018    tubular adenoam - due 5 yrs - 2 mm     Concussion without loss of consciousness 4/12/2017     Hidradenitis     left  groin     HSIL (high grade squamous intraepithelial lesion) on Pap smear of cervix 7/5/2000    Normal paps from 2001 to 2016/cc     Hypertension goal BP (blood pressure) < 140/90      OA (osteoarthritis) of knee     moderate     Postmenopausal since 7/2008      Shingles 7/23/2010    left      Squamous cell carcinoma of skin, unspecified      Synovial cyst of popliteal space          Current Mental Status Exam:   Appearance:  Appropriate    Eye Contact:  Good   Psychomotor:  Normal       Gait / station:  no problem  Attitude / Demeanor: Cooperative   Speech      Rate / Production: Normal/ Responsive Slow (slight)      Volume:  Normal  volume      Language:  intact  Mood:   \"subdued\" \"I'm usually happy, energetic, enjoying life.\"  Affect:   Appropriate    Thought Content: Clear   Thought Process: Goal Directed  Logical       Associations: No loosening of associations  Insight:   Fair   Judgment:  Intact "   Orientation:  All  Attention/concentration: Good      Substance Use:  Patient did report a family history of substance use concerns; see medical history section for details.  Patient has not received chemical dependency treatment in the past.  Patient has not ever been to detox.      Patient is not currently receiving any chemical dependency treatment.           Substance History of use Age of first use Date of last use     Pattern and duration of use (include amounts and frequency)   Alcohol never used       REPORTS SUBSTANCE USE: N/A   Cannabis   never used     REPORTS SUBSTANCE USE: N/A     Amphetamines   never used     REPORTS SUBSTANCE USE: N/A   Cocaine/crack    never used       REPORTS SUBSTANCE USE: N/A   Hallucinogens never used         REPORTS SUBSTANCE USE: N/A   Inhalants never used         REPORTS SUBSTANCE USE: N/A   Heroin never used         REPORTS SUBSTANCE USE: N/A   Other Opiates never used     REPORTS SUBSTANCE USE: N/A   Benzodiazepine   never used     REPORTS SUBSTANCE USE: N/A   Barbiturates never used     REPORTS SUBSTANCE USE: N/A   Over the counter meds never used     REPORTS SUBSTANCE USE: N/A   Caffeine currently use 17   REPORTS SUBSTANCE USE: reports using substance 1 times per day and has 1 cup of coffee at a time.   Patient reports heaviest use is current use.   Nicotine  never used     REPORTS SUBSTANCE USE: N/A   Other substances not listed above:  Identify:  never used     REPORTS SUBSTANCE USE: N/A     Patient reported the following problems as a result of their substance use: no problems, not applicable.    Substance Use: No symptoms    Based on the negative CAGE score and clinical interview there  are not indications of drug or alcohol abuse.      Significant Losses / Trauma / Abuse / Neglect Issues:   Patient did not serve in the .  There are indications or report of significant loss, trauma, abuse or neglect issues related to: major medical problems concussion  "04/2017.  Concerns for possible neglect are not present.    Safety Assessment:   Patient denies current homicidal ideation and behaviors.  Patient denies current self-injurious ideation and behaviors.    Patient denied risk behaviors associated with substance use.  Patient denies any high risk behaviors associated with mental health symptoms.  Patient reports the following current concerns for their personal safety: None.  Patient reports there are not firearms in the house.        .    History of Safety Concerns:  Patient denied a history of homicidal ideation.     Patient denied a history of personal safety concerns.    Patient denied a history of assaultive behaviors.    Patient denied a history of sexual assault behaviors.     Patient denied a history of risk behaviors associated with substance use.  Patient denies any history of high risk behaviors associated with mental health symptoms.  Patient reports the following protective factors: dedication to family or friends; safe and stable environment; regular sleep; regular physical activity; sense of belonging; purpose; commitment to well being; sense of meaning; strong sense of self worth or esteem    Risk Plan:  See Recommendations for Safety and Risk Management Plan    Review of Symptoms per patient report:  Depression: No symptoms  Seema:  No Symptoms  Psychosis: No Symptoms  Anxiety: Excessive worry, Nervousness, Physical complaints, such as headaches, stomachaches, muscle tension, Sleep disturbance, Ruminations and Poor concentration  Panic:  No symptoms  Post Traumatic Stress Disorder:  No Symptoms   Eating Disorder: No Symptoms  ADD / ADHD:  No symptoms  Conduct Disorder: No symptoms  Autism Spectrum Disorder: No symptoms  Obsessive Compulsive Disorder: No Symptoms    Patient reports the following compulsive behaviors and treatment history:  .      Sleep: averages \"6 good hours.\" would prefer 8, wakes up early and difficulty falling back to sleep because " of worry.    Diagnostic Criteria:   Generalized Anxiety Disorder  A. Excessive anxiety and worry about a number of events or activities (such as work or school performance).   B. The person finds it difficult to control the worry.  C. Select 3 or more symptoms (required for diagnosis). Only one item is required in children.   - Restlessness or feeling keyed up or on edge.    - Being easily fatigued.    - Difficulty concentrating or mind going blank.    - Irritability.    - Muscle tension.    - Sleep disturbance (difficulty falling or staying asleep, or restless unsatisfying sleep).   D. The focus of the anxiety and worry is not confined to features of an Axis I disorder.  E. The anxiety, worry, or physical symptoms cause clinically significant distress or impairment in social, occupational, or other important areas of functioning.   F. The disturbance is not due to the direct physiological effects of a substance (e.g., a drug of abuse, a medication) or a general medical condition (e.g., hyperthyroidism) and does not occur exclusively during a Mood Disorder, a Psychotic Disorder, or a Pervasive Developmental Disorder.    Functional Status:  Patient reports the following functional impairments:  home life with , relationship(s) and self-care.     Nonprogrammatic care:  Patient is requesting basic services to address current mental health concerns.    Clinical Summary:  1. Reason for assessment: treatment recommendations  .  2. Psychosocial, Cultural and Contextual Factors: difficulty with emotions since head injury in 2017  .  3. Principal DSM5 Diagnoses  (Sustained by DSM5 Criteria Listed Above):   300.02 (F41.1) Generalized Anxiety Disorder.  4. Other Diagnoses that is relevant to services:   .  5. Provisional Diagnosis:   as evidenced by  .  6. Prognosis: Relieve Acute Symptoms.  7. Likely consequences of symptoms if not treated: further deterioration of functioning.  8. Client strengths include:  caring,  creative, educated, empathetic, goal-focused, good listener, insightful, motivated, open to learning, open to suggestions / feedback, support of family, friends and providers, supportive, wants to learn, willing to ask questions, willing to relate to others and work history .     Recommendations:     1. Plan for Safety and Risk Management:   Recommended that patient call 911 or go to the local ED should there be a change in any of these risk factors..          Report to child / adult protection services was NA.     2. Patient's identified mental health concerns with a cultural influence will be addressed by making reasonable accomodations at the request of the patient.     3. Initial Treatment will focus on:    Anxiety -  .     4. Resources/Service Plan:    services are not indicated.   Modifications to assist communication are not indicated.   Additional disability accommodations are not indicated.      5. Collaboration:   Collaboration / coordination of treatment will be initiated with the following  support professionals: psychiatry.      6.  Referrals:   The following referral(s) will be initiated: pending following collaboration with Dr. Pompa. Next Scheduled Appointment: pending.     A Release of Information has been obtained for the following: N/A.    7. FARIDA:    FARIDA:  Discussed the general effects of drugs and alcohol on health and well-being. Provider gave patient printed information about the effects of chemical use on their health and well being. Recommendations:  N/A .     8. Records:   These were reviewed at time of assessment.   Information in this assessment was obtained from the medical record and  provided by patient who is a fair historian.    Patient will have open access to their mental health medical record.        Provider Name/ Credentials:  Eran Landon PsyD, RONNIE  May 12, 2022

## 2022-05-12 NOTE — Clinical Note
Please call this patient to get them scheduled for a follow-up visit in 2 weeks. Please schedule with me and the [facility], Dr. Landon. Thanks!

## 2022-05-12 NOTE — PROGRESS NOTES
"Jolynn Haji is a 66 year old year old who is being evaluated via a billable video visit.      How would you like to obtain your AVS? MyChart  If you are dropped from the video visit, the video invite should be resent to: Text to cell phone: see Epic  Will anyone else be joining your video visit? No       Telemedicine Visit: The patient's condition can be safely assessed and treated via synchronous audio and visual telemedicine encounter.      Reason for Telemedicine Visit: Covid-19 Pandemic    Originating Site (Patient Location): Patient's home     Distant Site (Provider Location): Provider Remote Setting    Mode of Communication:  Video Conference via [employer]    As the provider I attest to compliance with applicable laws and regulations related to telemedicine.                                                             Outpatient Psychiatric Evaluation- Standard  Adult    Name:  Jolynn Haji  : 1955    Source of Referral:  Primary Care Provider: Helga Ibarra MD   Current Psychotherapist: None     Identifying Data:  Patient is a 66 year old,   White Choose not to answer female  who presents for initial visit with me.  Patient is currently retired. Patient attended the phone/video session alone. Patient prefers to be called: \"Azalea\"    Chief Complaint:  Patient presents with:  Consult      HPI:  Jolynn Haji is a 66 year old female with past history including anxiety, depression, insomnia, concussion in 2017 who presents today for psychiatric assessment.     Patient is here for psychiatric medication evaluation.  Anxiety and depression symptoms started after she suffered a concussion in 2017.  She really did not have major mental health concerns before that time.  Also had pretty bad insomnia.  She was started on Lexapro, quetiapine, mirtazapine to help with her symptoms.  Once she was sleeping better again mirtazapine and quetiapine were discontinued.  She was continued on Lexapro.  " "Unfortunately around January she started to have recurrence of some of her symptoms.  She was started on Abilify.  She is not sure why she is not restarted on mirtazapine or quetiapine since the combination of Lexapro was quite helpful.  I asked about weight gain but she was not sure and reports she is actually lost weight over the past several months, maybe 20 pounds.  She initially said she lost 20 pounds over the last 6 weeks.  I did inquire if the weight loss was because she gained weight on the previous medications but she was not sure.  She reports her biggest concern recently has been her slowed thinking and feeling quite numb.  She reports this has been going on for at least the past 6 weeks.  She says she last felt like herself 6 weeks ago.  Looks like her Lexapro dose was increased about 6 weeks ago.  Patient reports no known negative side effects from the Abilify.  Patient would like to feel back to herself again.  No acute safety concerns.  No SI.  No problematic drug or alcohol use.    Per [facility], Dr. Eran Landon, during today's team-based visit:  The reason for seeking services at this time is: \"Anxiety and depression\".     First appointment with patient in [facility] and was advised of the short-term, team based structure of the model including role of C and provider. Patient indicated understanding of the model and agreed to proceed with services as described.     \"I'm hoping we can figure out what triggered this setback.\" She was diagnosed with anxiety and depression after a concussion a concussion in 04/2017. She had been sick at the time, got up one night and fell. She is uncertain where on her head she landed. She was on Lexapro, mirtazapine, and seroquel. She thought that it was \"fixed\", stopped taking the mirtazapine and seroquel but stayed on the lexapro. In January, started feeling more anxiety. PCP added Abilify which was helpful. The Lexapro was increased which she is not sure if it has been helpful " "(been on it for 4 weeks). She said, \"I feel like I'm in [place]. I have a hard time thinking things through.\" Her anxiety and sleep is better but bothered by the slowed thinking.      The problem(s) began 4/1/2022.     Patient has attempted to resolve these concerns in the past through medications.    Past diagnoses include: Depression and anxiety  Current medications include: has a current medication list which includes the following prescription(s): aripiprazole, clindamycin, doxylamine, escitalopram, lactobacillus rhamnosus (gg), and lisinopril.   Medication side effects: Yes: Feeling too numb and slowed?  Current stressors include: Symptoms and see HPI above  Coping mechanisms and supports include: Family, Hobbies and Friends    Psychiatric Review of Symptoms Per [facility], Dr. Eran Landon, during today's team-based visit:  Depression:     No symptoms  Seema:             No Symptoms  Psychosis:       No Symptoms  Anxiety:           Excessive worry, Nervousness, Physical complaints, such as headaches, stomachaches, muscle tension, Sleep disturbance, Ruminations and Poor concentration  Panic:              No symptoms  Post Traumatic Stress Disorder:  No Symptoms   Eating Disorder:          No Symptoms  ADD / ADHD:              No symptoms  Conduct Disorder:       No symptoms  Autism Spectrum Disorder:     No symptoms  Obsessive Compulsive Disorder:       No Symptoms     Patient reports the following compulsive behaviors and treatment history:  .       Sleep: averages \"6 good hours.\" would prefer 8, wakes up early and difficulty falling back to sleep because of worry.    All other ROS negative.     PHQ-9 scores:   PHQ-9 SCORE 1/31/2022 4/25/2022 5/9/2022   PHQ-9 Total Score - - -   PHQ-9 Total Score MyChart 0 16 (Moderately severe depression) -   PHQ-9 Total Score 0 16 12   Some encounter information is confidential and restricted. Go to Review Flowsheets activity to see all data.       TIFFANY-7 scores:    TIFFANY-7 SCORE " "4/25/2022 5/9/2022 5/12/2022   Total Score - - -   Total Score 11 (moderate anxiety) - 6 (mild anxiety)   Total Score 11 8 6       Medical Review of Systems:  10 systems (general, cardiovascular, respiratory, eyes, ENT, endocrine, GI, , M/S, neurological) were reviewed. Most pertinent finding(s) is/are: Some chronic pains. The remaining systems are all unremarkable.    A 12-item WHODAS 2.0 assessment was not completed.    Psychiatric History:   Hospitalizations: None  History of Commitment? No   Past Treatment: medication(s) from physician / PCP  Suicide Attempts: No   Current Suicide Risk: Suicide Assessment Completed Today.  Self-injurious Behavior: Denies  Electroconvulsive Therapy (ECT) or Transcranial Magnetic Stimulation (TMS): No   GeneSight Genetic Testing: No     Past medication trials include but are not limited to:   Psych Meds at Intake:  lexapro 20 mg daily  abilify 5 mg daily     Past Psych Meds:  Amitriptyline  Quetiapine  Mirtazapine - stopped \"because I was sleeping through the night\"    Substance Use History:  Current Use of Drugs/Alcohol: Denies   Past Use of Drugs/Alcohol: No known history of problematic drug or alcohol use.  Patient reports no problems as a result of their drinking / drug use.   Patient has not received chemical dependency treatment in the past  Recovery Programming Involvement: Not Applicable    Tobacco use: No    Based on the clinical interview, there  are not indications of drug or alcohol abuse. Continue to monitor.   Discussed effect of substance use on overall health.     Past Medical History:  Past Medical History:   Diagnosis Date     Abnormal glandular Papanicolaou smear of cervix- ASCUS in 2000 12/5/2003    ASC H- 2000     Basal cell carcinoma of leg, right 05/2016    Dr Rogers     Colon polyps 05/2018    tubular adenoam - due 5 yrs - 2 mm     Concussion without loss of consciousness 4/12/2017     Hidradenitis     left  groin     HSIL (high grade squamous " intraepithelial lesion) on Pap smear of cervix 7/5/2000    Normal paps from 2001 to 2016/cc     Hypertension goal BP (blood pressure) < 140/90      OA (osteoarthritis) of knee     moderate     Postmenopausal since 7/2008      Shingles 7/23/2010    left      Squamous cell carcinoma of skin, unspecified      Synovial cyst of popliteal space       Surgery:   Past Surgical History:   Procedure Laterality Date     COLONOSCOPY  05/2018    polyps, tubular - 2 mm - due 5 yrs     Z NONSPECIFIC PROCEDURE  2000    Colposcopy     Food and Medicine Allergies:     Allergies   Allergen Reactions     Fosamax Diarrhea     Diarrhea, stomach cramps, jaw pain      Augmentin Itching and Rash     Seizures or Head Injury: No seizures but significant concussion 04/2017  Diet: No Restrictions  Exercise: Not discussed  Supplements: Reviewed per Electronic Medical Record Today    Current Medications:    Current Outpatient Medications:      ARIPiprazole (ABILIFY) 5 MG tablet, Take 1 tablet (5 mg) by mouth daily, Disp: 135 tablet, Rfl: 0     clindamycin (CLINDAGEL) 1 % external gel, Apply topically daily, Disp: 60 g, Rfl: 11     doxylamine (UNISOM) 25 MG TABS tablet, Take 25 mg by mouth At Bedtime Takes nightly, Disp: , Rfl:      escitalopram (LEXAPRO) 20 MG tablet, Take 1 tablet (20 mg) by mouth daily, Disp: 90 tablet, Rfl: 1     lactobacillus rhamnosus, GG, (CULTURELL) capsule, Take 1 capsule by mouth 2 times daily, Disp: , Rfl:      lisinopril (ZESTRIL) 2.5 MG tablet, Take 1 tablet (2.5 mg) by mouth daily, Disp: 90 tablet, Rfl: 3    Vital Signs:  None since this is a phone/video visit.     Labs:  Most recent laboratory results reviewed and the pertinent results include:   Office Visit on 04/25/2022   Component Date Value Ref Range Status     Sodium 04/25/2022 137  133 - 144 mmol/L Final     Potassium 04/25/2022 4.4  3.4 - 5.3 mmol/L Final     Chloride 04/25/2022 105  94 - 109 mmol/L Final     Carbon Dioxide (CO2) 04/25/2022 26  20 - 32  mmol/L Final     Anion Gap 04/25/2022 6  3 - 14 mmol/L Final     Urea Nitrogen 04/25/2022 23  7 - 30 mg/dL Final     Creatinine 04/25/2022 0.78  0.52 - 1.04 mg/dL Final     Calcium 04/25/2022 9.3  8.5 - 10.1 mg/dL Final     Glucose 04/25/2022 104 (A) 70 - 99 mg/dL Final     Alkaline Phosphatase 04/25/2022 64  40 - 150 U/L Final     AST 04/25/2022 11  0 - 45 U/L Final     ALT 04/25/2022 21  0 - 50 U/L Final     Protein Total 04/25/2022 7.1  6.8 - 8.8 g/dL Final     Albumin 04/25/2022 4.0  3.4 - 5.0 g/dL Final     Bilirubin Total 04/25/2022 0.4  0.2 - 1.3 mg/dL Final     GFR Estimate 04/25/2022 83  >60 mL/min/1.73m2 Final    Effective December 21, 2021 eGFRcr in adults is calculated using the 2021 CKD-EPI creatinine equation which includes age and gender (Mercedes et al., NEJ, DOI: 10.1056/AYHOwy9066249)     TSH 04/25/2022 1.30  0.40 - 4.00 mU/L Final     25 OH Vitamin D2 04/25/2022 <5  ug/L Final     25 OH Vitamin D3 04/25/2022 33  ug/L Final     25 OH Vit D Total 04/25/2022 <38  20 - 75 ug/L Final    Season, race, dietary intake, and treatment affect the concentration of 25-hydroxy-Vitamin D. Values may decrease during winter months and increase during summer months. Values 20-29 ug/L may indicate Vitamin D insufficiency and values <20 ug/L may indicate Vitamin D deficiency.     WBC Count 04/25/2022 8.1  4.0 - 11.0 10e3/uL Final     RBC Count 04/25/2022 4.58  3.80 - 5.20 10e6/uL Final     Hemoglobin 04/25/2022 13.6  11.7 - 15.7 g/dL Final     Hematocrit 04/25/2022 41.1  35.0 - 47.0 % Final     MCV 04/25/2022 90  78 - 100 fL Final     MCH 04/25/2022 29.7  26.5 - 33.0 pg Final     MCHC 04/25/2022 33.1  31.5 - 36.5 g/dL Final     RDW 04/25/2022 12.9  10.0 - 15.0 % Final     Platelet Count 04/25/2022 252  150 - 450 10e3/uL Final     % Neutrophils 04/25/2022 72  % Final     % Lymphocytes 04/25/2022 17  % Final     % Monocytes 04/25/2022 8  % Final     % Eosinophils 04/25/2022 2  % Final     % Basophils 04/25/2022 1  %  Final     % Immature Granulocytes 04/25/2022 0  % Final     Absolute Neutrophils 04/25/2022 5.9  1.6 - 8.3 10e3/uL Final     Absolute Lymphocytes 04/25/2022 1.4  0.8 - 5.3 10e3/uL Final     Absolute Monocytes 04/25/2022 0.6  0.0 - 1.3 10e3/uL Final     Absolute Eosinophils 04/25/2022 0.2  0.0 - 0.7 10e3/uL Final     Absolute Basophils 04/25/2022 0.1  0.0 - 0.2 10e3/uL Final     Absolute Immature Granulocytes 04/25/2022 0.0  <=0.4 10e3/uL Final   Office Visit on 01/31/2022   Component Date Value Ref Range Status     Creatinine Urine mg/dL 01/31/2022 69  mg/dL Final     Albumin Urine mg/L 01/31/2022 6  mg/L Final     Albumin Urine mg/g Cr 01/31/2022 8.70  0.00 - 25.00 mg/g Cr Final     25 OH Vitamin D2 01/31/2022 <5  ug/L Final     25 OH Vitamin D3 01/31/2022 35  ug/L Final     25 OH Vit D Total 01/31/2022 <40  20 - 75 ug/L Final    Season, race, dietary intake, and treatment affect the concentration of 25-hydroxy-Vitamin D. Values may decrease during winter months and increase during summer months. Values 20-29 ug/L may indicate Vitamin D insufficiency and values <20 ug/L may indicate Vitamin D deficiency.     WBC Count 01/31/2022 6.5  4.0 - 11.0 10e3/uL Final     RBC Count 01/31/2022 4.64  3.80 - 5.20 10e6/uL Final     Hemoglobin 01/31/2022 13.8  11.7 - 15.7 g/dL Final     Hematocrit 01/31/2022 42.1  35.0 - 47.0 % Final     MCV 01/31/2022 91  78 - 100 fL Final     MCH 01/31/2022 29.7  26.5 - 33.0 pg Final     MCHC 01/31/2022 32.8  31.5 - 36.5 g/dL Final     RDW 01/31/2022 13.0  10.0 - 15.0 % Final     Platelet Count 01/31/2022 212  150 - 450 10e3/uL Final     Sodium 01/31/2022 140  133 - 144 mmol/L Final     Potassium 01/31/2022 4.1  3.4 - 5.3 mmol/L Final     Chloride 01/31/2022 107  94 - 109 mmol/L Final     Carbon Dioxide (CO2) 01/31/2022 28  20 - 32 mmol/L Final     Anion Gap 01/31/2022 5  3 - 14 mmol/L Final     Urea Nitrogen 01/31/2022 15  7 - 30 mg/dL Final     Creatinine 01/31/2022 0.68  0.52 - 1.04 mg/dL  Final     Calcium 01/31/2022 9.5  8.5 - 10.1 mg/dL Final     Glucose 01/31/2022 89  70 - 99 mg/dL Final     Alkaline Phosphatase 01/31/2022 68  40 - 150 U/L Final     AST 01/31/2022 9  0 - 45 U/L Final     ALT 01/31/2022 19  0 - 50 U/L Final     Protein Total 01/31/2022 7.1  6.8 - 8.8 g/dL Final     Albumin 01/31/2022 4.3  3.4 - 5.0 g/dL Final     Bilirubin Total 01/31/2022 0.6  0.2 - 1.3 mg/dL Final     GFR Estimate 01/31/2022 >90  >60 mL/min/1.73m2 Final    Effective December 21, 2021 eGFRcr in adults is calculated using the 2021 CKD-EPI creatinine equation which includes age and gender (Mercedes et al., NEJ, DOI: 10.1056/AGSUzs8239458)     Cholesterol 01/31/2022 208 (A) <200 mg/dL Final     Triglycerides 01/31/2022 81  <150 mg/dL Final     Direct Measure HDL 01/31/2022 82  >=50 mg/dL Final     LDL Cholesterol Calculated 01/31/2022 110 (A) <=100 mg/dL Final     Non HDL Cholesterol 01/31/2022 126  <130 mg/dL Final     Patient Fasting > 8hrs? 01/31/2022 Yes   Final     TSH 01/31/2022 1.53  0.40 - 4.00 mU/L Final   Office Visit on 09/24/2021   Component Date Value Ref Range Status     Case Report 09/24/2021    Final                    Value:Surgical Pathology Report                         Case: [id number]                                  Authorizing Provider:  Ligia Carbajal PA-C    Collected:           09/24/2021 10:33 AM          Ordering Location:     [facility]   Received:            09/24/2021 02:18 PM                                 [facility]                                                           Pathologist:           Rodrigo Taylor MD                                                      Specimen:    Skin, left shin                                                                             Final Diagnosis 09/24/2021    Final                    Value:This result contains rich text formatting which cannot be displayed here.     Clinical Information 09/24/2021    Final           "          Value:This result contains rich text formatting which cannot be displayed here.     Gross Description 2021    Final                    Value:This result contains rich text formatting which cannot be displayed here.     Microscopic Description 2021    Final                    Value:This result contains rich text formatting which cannot be displayed here.     Performing Labs 2021    Final                    Value:This result contains rich text formatting which cannot be displayed here.     Most recent EKG from 2008 reviewed. QTc interval 429.      Family History:   Patient reported family history includes:   Family History   Problem Relation Age of Onset     Thyroid Disease Mother         Graves disease     Hypertension Mother      Diabetes Mother         type 2     Blood Disease Mother          from leukemia at age 78     Diabetes Daughter         Juvenile Diabetes     Heart Disease Father 62        MV problem with CHF  at 62/ from MI     Hypertension Father      Diabetes Son         Juvenile Diabetes     Cancer Maternal Uncle         type ??     Cancer Maternal Uncle         liver     Mental Illness History: Denies  Substance Abuse History: Denies  Suicide History: Denies  Medications: Unknown     Social History Per [facility], Dr. Eran Landon, during today's team-based visit:   Patient reported they grew up in [place], [place].  They were raised by biological parents  . She had 1 younger brother and 1 younger sister. Parents were always together.  Patient reported that their childhood was \"my dad worked at  and mom stayed at home. I had a wonderful childhood.\" Patient described their current relationships with family of origin as: both parents are , and she has good relationship with her siblings.      The patient describes their cultural background as .  Cultural influences and impact on patient's life structure, values, norms, and healthcare: " [place].  Contextual influences on patient's health include: Family Factors feels she is not being the grandmother she desires to be.    These factors will be addressed in the Preliminary Treatment plan. Patient identified their preferred language to be English. Patient reported they does not need the assistance of an  or other support involved in therapy.      Patient reported had no significant delays in developmental tasks.   Patient's highest education level was graduate school  earning a masters in education.  Patient identified the following learning problems: none reported.  Modifications will not be used to assist communication in therapy. Patient reports they are not  able to understand written materials.     Patient reported the following relationship history:  Patient's current relationship status is  for 43 years.   Patient identified their sexual orientation as heterosexual.  Patient reported having 2 child(mateo), a daughter and son. Patient identified partner; friends as part of their support system.  Patient identified the quality of these relationships as stable and meaningful.       Patient's current living/housing situation involves staying in own home/apartment.  The immediate members of family and household include Zach, Allan, and they report that housing is stable.     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  Patient reports their finances are obtained through other. Patient does not identify finances as a current stressor.      Firearms/Weapons Access: No: Patient denies   Service: No    Legal History:  No: Patient denies any legal history    Significant Losses / Trauma / Abuse / Neglect Issues:  There are indications or report of significant loss, trauma, abuse or neglect issues related to: Concussion in April 2017.   Issues of possible neglect are not present.     Comprehensive Examination (limited due to virtual visit format,  phone/video):  Vital Signs:  Vitals: There were no vitals taken for this visit.  General/Constitutional:  Appearance: awake, alert, adequately groomed, appeared stated age and no apparent distress  Attitude:  cooperative, pleasant  Eye Contact:  good  Musculoskeletal:  Muscle Strength and Tone: no gross abnormalities by observation  Psychomotor Behavior:  no evidence of tardive dyskinesia, dystonia, or tics  Gait and Station: normal, no gross abnormalities noted by observation  Psychiatric:  Speech: A little delayed/hesitant responses perhaps?  Dysarthria?  Overall coherent, normal volume, normal rate  Associations:  no loose associations  Thought Process:  logical, linear and goal oriented  Thought Content:  no evidence of suicidal ideation or homicidal ideation, no evidence of psychotic thought, no auditory hallucinations present and no visual hallucinations present  Mood:  anxious  Affect:  mood congruent  Insight:  fair  Judgment:  intact, adequate for safety  Impulse Control:  intact  Neurological:  Oriented to:  person, place, time, and situation  Attention Span and Concentration:  normal  Language: intact  Recent and Remote Memory: Possibly some deficits?  Not formally tested.  Could consider neuropsychological testing recommendation.  Fund of Knowledge: appropriate    Strengths and Opportunities Per [facility], Dr. Eran Landon, during today's team-based visit:   Patient identified the following strengths or resources that will help them succeed in treatment: Ep[employer]opalian / Mandaen, commitment to health and well being, umm / spirituality, friends / good social support, family support, insight, intelligence, motivation, sense of humor, strong social skills and work ethic. Things that may interfere with the patient's success in treatment include: physical health concerns.     Suicide Risk Assessment:  Today Jolynn Haji reports no suicidal ideation. Based on all available evidence including the factors cited above, Jolynn RESTREPO  Adithya does not appear to be at imminent risk for self-harm, does not meet criteria for a 72-hr hold, and therefore remains appropriate for ongoing outpatient level of care.  A thorough assessment of risk factors related to suicide and self-harm have been reviewed and are noted above. The patient convincingly denies acute suicidality on several occasions. Local community safety resources reviewed and printed for patient to use if needed. There was no deceit detected, and the patient presented in a manner that was believable.     DSM5  Diagnosis:  300.02 (F41.1) Generalized Anxiety Disorder   R/O Mood Disorder  Insomnia, unspecified    Medical Comorbidities Include:   Patient Active Problem List    Diagnosis Date Noted     Essential hypertension with goal blood pressure less than 140/90 07/16/2001     Priority: High     Adjustment insomnia 12/23/2021     Priority: Medium     Screening for cervical cancer      Priority: Medium     1999 NIL  2000 ASCUS-H.  >> Colpo: Neg  2001 NIL   2003 NIL pap, Neg HPV  [id number], 2016  NIL pap  7/25/19 NIL pap, Neg HPV    Criteria necessary to stop screening per ASCCP guidelines:  Older than 65 years  Three consecutive negative cytology results or two consecutive negative cotest results within the previous 10 years, with the most recent being performed within the last 5 years.   (Women with hx of MADDIE 2 or 3, or adenocarcinoma in situ should continue screening for full 20 years, even if this goes past age 65).         Hyperlipidemia LDL goal <130 11/30/2018     Priority: Medium     Basal cell carcinoma      Priority: Medium     Colon polyps 05/01/2018     Priority: Medium     Primary localized osteoarthrosis of left lower leg 02/19/2018     Priority: Medium     Other secondary osteoarthritis of left knee 01/29/2018     Priority: Medium     Chronic constipation 09/14/2017     Priority: Medium     Raynaud's phenomenon without gangrene 09/14/2017     Priority: Medium     Insomnia,  unspecified type 07/25/2017     Priority: Medium     TIFFANY (generalized anxiety disorder) 05/23/2017     Priority: Medium     Adjustment disorder with mixed anxiety and depressed mood- mild at this time 05/11/2017     Priority: Medium     Family history of celiac disease 05/11/2017     Priority: Medium     Basal cell carcinoma of leg, right 05/01/2016     Priority: Medium     Sun-damaged skin 04/21/2016     Priority: Medium     Seasonal allergic rhinitis 04/21/2016     Priority: Medium     Dupuytren's disease of palm - right 4th digit flexor tendon 06/11/2015     Priority: Medium     History of migraine headaches- assoc. with nausea/vomiting - resolved around menopause - were  premenstrual  01/08/2014     Priority: Medium     Rosacea 05/01/2013     Priority: Medium     Numbness of feet- distal feet R>L  02/21/2011     Priority: Medium     NUMBNESS AND TINGLING OF FOOT - bilateral -mild  08/25/2010     Priority: Medium     Osteopenia 01/02/2009     Priority: Medium     Lipidosis 10/24/2006     Priority: Medium     Problem list name updated by automated process. Provider to review       Localized osteoarthritis of hand 11/01/2005     Priority: Medium     Problem list name updated by automated process. Provider to review       Stress incontinence, female - mild 10/23/2012     Priority: Low     Cough 10/23/2012     Priority: Low     Acute bronchitis- recurrent- ? related to mold in school building- pt has since retired and no further bronchitis 10/23/2012     Priority: Low     Postmenopausal      Priority: Low     DERMATOPHYTOSIS OF NAIL- toenails  03/14/2006     Priority: Low     trichophyton rubrum on culture - 1/06        Synovial cyst of popliteal space      Priority: Low     Hidradenitis 12/05/2003     Priority: Low       Impression:  Jolynn Haji is a 66-year-old female with a history including anxiety, depression, insomnia status post concussion in April 2017.  No major mental health struggles prior to concussion in  2017.  Patient was started on Lexapro, quetiapine, mirtazapine to help with her symptoms.  Patient had been feeling much better on that medication combination and once she was sleeping well again quetiapine and mirtazapine were discontinued.  This past January the patient started to have some worsening symptoms and Abilify was added.  Patient has felt like her current medications of Lexapro and Abilify have been helpful but reports starting to feel really slowed and numb the past several weeks.  She also reported a weight loss of nearly 20 pounds over the last 6 weeks versus several months (patient was not very sure). I am wondering if she could be experiencing side effects of her increased Lexapro dose.  She is agreeable to decreasing her dose slightly to 15 mg daily.  Could also consider decreasing Abilify in the case it is not her Lexapro.  She also desires to sleep a little bit better than she has been.  Could consider restarting mirtazapine at bedtime for sleep and to also augment her Lexapro.  Could also be beneficial for patient to undergo cognitive screening to check in and see what her baseline is status post concussion.  No acute safety concerns.  No SI.  No problematic drug or alcohol use.    Medication side effects and alternatives reviewed. Health promotion activities recommended and reviewed today. All questions addressed. Education and counseling completed regarding risks and benefits of medications and psychotherapy options. Recommend therapy for additional support.     Treatment Plan:    Continue Abilify/aripiprazole 5 mg daily to augment Lexapro.    Decrease Lexapro/excitalopram to 15 mg daily for anxiety, mood and to see if some of what you have been experiencing as possible side effects improves.      Consider individual psychotherapy for additional support and ongoing development of nonpharmacologic coping skills and strategies.    Continue all other cares per primary care provider.     Continue  "all other medications as reviewed per electronic medical record today.     Safety plan reviewed. To the Emergency Department as needed or call after hours crisis line at [phone] or [phone]. [place] Crisis Text Line: Text [place] to 152713  or  Suicide LifeLine Chat: suicidepreventionlifeline.org/chat    Schedule an appointment with me in 2 weeks or sooner as needed.  Call [facility] at [phone] to schedule.    Follow up with primary care provider as planned or sooner if needed for acute medical concerns.    Call the psychiatric nurse line with medication questions or concerns at [phone].    Walk-inhart may be used to communicate with your provider, but this is not intended to be used for emergencies.    Patient Education:  Get Out of Your Mind & Into Your Life   By Grupo Lacy    The Happiness Trap: How to Stop Struggling and Start Living  By William Colindres    The Reality Slap: Finding Peace & Fulfillment When Life Hurts  By William Colindres    Things Might Go Terribly, Horribly Wrong: A Guide to Life Liberated from Anxiety  By Nakia Acevedo, PhD    Stress Less, Live More: How Acceptance and Commitment Therapy Can Help You Live a Busy Yet Balanced Life  By Jimbo Diamond    Finding Life Beyond Trauma: Using Acceptance and Commitment Therapy to Heal From Post-Traumatic Stress and Trauma-Related Problems  By Yesenia Bai and Jenna Howell    Other ACT Skills References     William Colindres on YouKorbit[employer] - Demonstrates several different skills to deal with difficult thoughts and feelings     Book:  \"A Liberated Mind: How to Pivot Toward What Matters\" by Grupo Lacy     Psychological flexibility: How love turns pain into purpose  Tedx Talk by Dr. Lacy discussing his struggle with anxiety and panic disorder which motivated him to develop ACT therapy (Acceptance and Commitment Therapy)     You Are Not Your Thoughts    Community Resources:    National Suicide Prevention Lifeline: " 967.110.8189 (TTY: 699.991.4118). Call anytime for help.  (www.suicidepreventionlifeline.org)  National [place] [employer] Mental Illness (www.bijan.org): 830.846.7221 or 112-602-9207.   Mental Health Association (www.mentalhealth.org): 862.430.2101 or 137-928-9093.  [place] Crisis Text Line: Text [place] to 680968  Suicide LifeLine Chat: suicideBridgeXs.org/chat    Administrative Billing:   Phone Call/Video Duration: 18 Minutes  Start: 3:52p  Stop: 4:10p    Patient Status:  Patient will continue to be seen for ongoing consultation and stabilization.    Signed:   Judy Pompa DO  [facility]    Disclaimer: This note consists of symbols derived from keyboarding, dictation and/or voice recognition software. As a result, there may be errors in the script that have gone undetected. Please consider this when interpreting information found in this chart.

## 2022-05-13 ASSESSMENT — ANXIETY QUESTIONNAIRES: GAD7 TOTAL SCORE: 6

## 2022-05-23 ENCOUNTER — OFFICE VISIT (OUTPATIENT)
Dept: FAMILY MEDICINE | Facility: CLINIC | Age: 67
End: 2022-05-23
Payer: COMMERCIAL

## 2022-05-23 VITALS
HEIGHT: 68 IN | WEIGHT: 134.1 LBS | SYSTOLIC BLOOD PRESSURE: 120 MMHG | HEART RATE: 100 BPM | RESPIRATION RATE: 16 BRPM | BODY MASS INDEX: 20.32 KG/M2 | DIASTOLIC BLOOD PRESSURE: 40 MMHG | OXYGEN SATURATION: 98 % | TEMPERATURE: 98.4 F

## 2022-05-23 DIAGNOSIS — R41.843 PSYCHOMOTOR RETARDATION: ICD-10-CM

## 2022-05-23 DIAGNOSIS — F43.23 ADJUSTMENT DISORDER WITH MIXED ANXIETY AND DEPRESSED MOOD: Primary | ICD-10-CM

## 2022-05-23 PROCEDURE — 99214 OFFICE O/P EST MOD 30 MIN: CPT | Performed by: FAMILY MEDICINE

## 2022-05-23 ASSESSMENT — ANXIETY QUESTIONNAIRES
GAD7 TOTAL SCORE: 6
3. WORRYING TOO MUCH ABOUT DIFFERENT THINGS: SEVERAL DAYS
8. IF YOU CHECKED OFF ANY PROBLEMS, HOW DIFFICULT HAVE THESE MADE IT FOR YOU TO DO YOUR WORK, TAKE CARE OF THINGS AT HOME, OR GET ALONG WITH OTHER PEOPLE?: SOMEWHAT DIFFICULT
GAD7 TOTAL SCORE: 6
2. NOT BEING ABLE TO STOP OR CONTROL WORRYING: MORE THAN HALF THE DAYS
5. BEING SO RESTLESS THAT IT IS HARD TO SIT STILL: SEVERAL DAYS
7. FEELING AFRAID AS IF SOMETHING AWFUL MIGHT HAPPEN: NOT AT ALL
GAD7 TOTAL SCORE: 6
1. FEELING NERVOUS, ANXIOUS, OR ON EDGE: SEVERAL DAYS
6. BECOMING EASILY ANNOYED OR IRRITABLE: NOT AT ALL
7. FEELING AFRAID AS IF SOMETHING AWFUL MIGHT HAPPEN: NOT AT ALL
4. TROUBLE RELAXING: SEVERAL DAYS

## 2022-05-23 ASSESSMENT — PATIENT HEALTH QUESTIONNAIRE - PHQ9
10. IF YOU CHECKED OFF ANY PROBLEMS, HOW DIFFICULT HAVE THESE PROBLEMS MADE IT FOR YOU TO DO YOUR WORK, TAKE CARE OF THINGS AT HOME, OR GET ALONG WITH OTHER PEOPLE: SOMEWHAT DIFFICULT
SUM OF ALL RESPONSES TO PHQ QUESTIONS 1-9: 9
SUM OF ALL RESPONSES TO PHQ QUESTIONS 1-9: 9

## 2022-05-23 NOTE — PROGRESS NOTES
Assessment & Plan :       ICD-10-CM    1. Adjustment disorder with mixed anxiety and depressed mood- mild-moderate  at this time  F43.23    2. Psychomotor retardation  F45.9         Ordering of each unique test  Prescription drug management  60 minutes spent on the date of the encounter doing chart review, history and exam, documentation and further activities per the note    Despite seeing psychiatry for her meds, she'd like to continue to come here as well for coordination and continuity of care. For her comfort and peace of mind, we will continue with that. l     MEDICATIONS:   No orders of the defined types were placed in this encounter.         - Continue other medications without change  Regular exercise  See Patient Instructions    Return in about 16 days (around 6/8/2022) for depression/anxiety follow up, w/ Dr. SUE for 40 minute appointment.           Helga Ibarra MD  [facility]      Subjective :   Azalea is a 66 year old who presents for the following health issues  accompanied by her daughter.Malini.     History of Present Illness       Mental Health Follow-up:  Patient presents to follow-up on Depression & Anxiety.Patient's depression since last visit has been:  Medium  The patient is not having other symptoms associated with depression.  Patient's anxiety since last visit has been:  Good  The patient is not having other symptoms associated with anxiety.  Any significant life events: health concerns  Patient is not feeling anxious or having panic attacks.  Patient has no concerns about alcohol or drug use.    She eats 2-3 servings of fruits and vegetables daily.She consumes 0 sweetened beverage(s) daily.She exercises with enough effort to increase her heart rate 10 to 19 minutes per day.  She exercises with enough effort to increase her heart rate 4 days per week.   She is taking medications regularly.    Today's PHQ-9         PHQ-9 Total Score: 9    PHQ-9 Q9 Thoughts  of better off dead/self-harm past 2 weeks :   Not at all    How difficult have these problems made it for you to do your work, take care of things at home, or get along with other people: Somewhat difficult  Today's TIFFANY-7 Score: 6         Social History     Tobacco Use     Smoking status: Never Smoker     Smokeless tobacco: Never Used   Substance Use Topics     Alcohol use: Yes     Alcohol/week: 0.0 - 1.0 standard drinks     Comment: 2 per month     Drug use: No     Comment: no herbal meds either     PHQ 4/25/2022 5/9/2022 5/23/2022   PHQ-9 Total Score 16 12 9   Q9: Thoughts of better off dead/self-harm past 2 weeks Not at all Not at all Not at all   Some encounter information is confidential and restricted. Go to Review Attunity activity to see all data.     TIFFANY-7 SCORE 5/9/2022 5/12/2022 5/23/2022   Total Score - - -   Total Score - 6 (mild anxiety) 6 (mild anxiety)   Total Score 8 6 6     Last PHQ-9 5/23/2022   1.  Little interest or pleasure in doing things 1   2.  Feeling down, depressed, or hopeless 0   3.  Trouble falling or staying asleep, or sleeping too much 1   4.  Feeling tired or having little energy 2   5.  Poor appetite or overeating 2   6.  Feeling bad about yourself 0   7.  Trouble concentrating 1   8.  Moving slowly or restless 2   Q9: Thoughts of better off dead/self-harm past 2 weeks 0   PHQ-9 Total Score 9   Difficulty at work, home, or with people -   Some encounter information is confidential and restricted. Go to Review Attunity activity to see all data.     TIFFANY-7  5/23/2022   1. Feeling nervous, anxious, or on edge 1   2. Not being able to stop or control worrying 2   3. Worrying too much about different things 1   4. Trouble relaxing 1   5. Being so restless that it is hard to sit still 1   6. Becoming easily annoyed or irritable 0   7. Feeling afraid, as if something awful might happen 0   TIFFANY-7 Total Score 6   If you checked any problems, how difficult have they made it for you to do  "your work, take care of things at home, or get along with other people? -     Feels her words and thoughts are not coming to her as quickly and hard to have conversations with others.  She usually has a really quick wit and is frustrated with not being able to socialize with her friends as easily as previously. Recently encountered that during a visit with friends at their home.  They are very supportive of her and recovery process from this episode of depression.       Pt seeing psychiatry - Dr. Enriqueta Pompa - had first meeting with her 5/12/2022 - reviewed that note.  Started with counseling with Dr. Eran Landon - [facility] on 5/12/2022 as well.  - reviewed his note as well.     Falls asleep well and stays asleep well until 4:30am - takes Unisom nightly just prior to 10pm - then sleeps soundly until 0430.  When she awakens, she usually awakens feeling rested.  Doesn't feel \"hungover\" in the am from the Unisom.      Weight loss from lack of appetite - stable - not gaining - has been eating 3 meals /day.  Trying to eat healthy food .  is now cooking for her and they are going grocery shopping together.   Wt Readings from Last 5 Encounters:   05/23/22 60.8 kg (134 lb 1.6 oz)   05/09/22 61.7 kg (136 lb)   04/25/22 61.7 kg (136 lb)   01/31/22 68.5 kg (151 lb)   01/13/21 70.8 kg (156 lb)     Body mass index is 20.54 kg/m .   Suicide Assessment Five-step Evaluation and Treatment (SAFE-T)    Recently saw ENT for right ear feeling plugged -was diagnosed with mild meniere's.   Patient Active Problem List   Diagnosis     Essential hypertension with goal blood pressure less than 140/90     Hidradenitis     Localized osteoarthritis of hand     Synovial cyst of popliteal space     DERMATOPHYTOSIS OF NAIL- toenails      Lipidosis     Osteopenia     Postmenopausal     NUMBNESS AND TINGLING OF FOOT - bilateral -mild      Numbness of feet- distal feet R>L      Stress incontinence, female - mild     Cough     Acute bronchitis- " recurrent- ? related to mold in school building- pt has since retired and no further bronchitis     Rosacea     History of migraine headaches- assoc. with nausea/vomiting - resolved around menopause - were  premenstrual      Dupuytren's disease of palm - right 4th digit flexor tendon     Sun-damaged skin     Seasonal allergic rhinitis     Basal cell carcinoma of leg, right     Adjustment disorder with mixed anxiety and depressed mood- mild-moderate  at this time     Family history of celiac disease     TIFFANY (generalized anxiety disorder)     Insomnia, unspecified type     Chronic constipation     Raynaud's phenomenon without gangrene     Other secondary osteoarthritis of left knee     Primary localized osteoarthrosis of left lower leg     Hyperlipidemia LDL goal <130     Basal cell carcinoma     Colon polyps     Screening for cervical cancer     Adjustment insomnia       Current Outpatient Medications: including med changes made today    Medication Sig Dispense Refill     clindamycin (CLINDAGEL) 1 % external gel Apply topically daily 60 g 11     doxylamine (UNISOM) 25 MG TABS tablet Take 25 mg by mouth At Bedtime Takes nightly       escitalopram (LEXAPRO) 10 MG tablet Take one tab (10 mg) by mouth WITH 5 mg tab for total dose of 15 mg daily. 30 tablet 0     escitalopram (LEXAPRO) 5 MG tablet Take one tab (5 mg) by mouth WITH 10 mg tab for total dose of 15 mg daily. 30 tablet 0     lactobacillus rhamnosus, GG, (CULTURELL) capsule Take 1 capsule by mouth 2 times daily       lisinopril (ZESTRIL) 2.5 MG tablet Take 1 tablet (2.5 mg) by mouth daily 90 tablet 3     ARIPiprazole (ABILIFY) 2 MG tablet Take 1 tablet (2 mg) by mouth daily 30 tablet 1          Allergies   Allergen Reactions     Fosamax Diarrhea     Diarrhea, stomach cramps, jaw pain      Augmentin Itching and Rash            Review of Systems :  Constitutional, HEENT, cardiovascular, pulmonary, GI, , musculoskeletal, neuro, skin, endocrine and psych systems  "are negative, except as otherwise noted.      Objective  :   /40   Pulse 100   Temp 98.4  F (36.9  C) (Tympanic)   Resp 16   Ht 1.721 m (5' 7.75\")   Wt 60.8 kg (134 lb 1.6 oz)   LMP 06/28/2001   SpO2 98%   BMI 20.54 kg/m    Body mass index is 20.54 kg/m .  Physical Exam   GENERAL: healthy, alert and no distress  EYES: Eyes grossly normal to inspection, PERRL and conjunctivae and sclerae normal  HENT: ear canals and TM's normal, nose and mouth without ulcers or lesions  RESP: lungs clear to auscultation - no rales, rhonchi or wheezes  SKIN: no suspicious lesions or rashes  PSYCH: affect = a bit flat, appearance well groomed and well dressed.  Hair and makeup done.      Office Visit on 04/25/2022   Component Date Value Ref Range Status     Sodium 04/25/2022 137  133 - 144 mmol/L Final     Potassium 04/25/2022 4.4  3.4 - 5.3 mmol/L Final     Chloride 04/25/2022 105  94 - 109 mmol/L Final     Carbon Dioxide (CO2) 04/25/2022 26  20 - 32 mmol/L Final     Anion Gap 04/25/2022 6  3 - 14 mmol/L Final     Urea Nitrogen 04/25/2022 23  7 - 30 mg/dL Final     Creatinine 04/25/2022 0.78  0.52 - 1.04 mg/dL Final     Calcium 04/25/2022 9.3  8.5 - 10.1 mg/dL Final     Glucose 04/25/2022 104 (A) 70 - 99 mg/dL Final     Alkaline Phosphatase 04/25/2022 64  40 - 150 U/L Final     AST 04/25/2022 11  0 - 45 U/L Final     ALT 04/25/2022 21  0 - 50 U/L Final     Protein Total 04/25/2022 7.1  6.8 - 8.8 g/dL Final     Albumin 04/25/2022 4.0  3.4 - 5.0 g/dL Final     Bilirubin Total 04/25/2022 0.4  0.2 - 1.3 mg/dL Final     GFR Estimate 04/25/2022 83  >60 mL/min/1.73m2 Final    Effective December 21, 2021 eGFRcr in adults is calculated using the 2021 CKD-EPI creatinine equation which includes age and gender (Mercedes et al., NEJ, DOI: 10.1056/JCLRtz4098785)     TSH 04/25/2022 1.30  0.40 - 4.00 mU/L Final     25 OH Vitamin D2 04/25/2022 <5  ug/L Final     25 OH Vitamin D3 04/25/2022 33  ug/L Final     25 OH Vit D Total 04/25/2022 " <38  20 - 75 ug/L Final    Season, race, dietary intake, and treatment affect the concentration of 25-hydroxy-Vitamin D. Values may decrease during winter months and increase during summer months. Values 20-29 ug/L may indicate Vitamin D insufficiency and values <20 ug/L may indicate Vitamin D deficiency.     WBC Count 04/25/2022 8.1  4.0 - 11.0 10e3/uL Final     RBC Count 04/25/2022 4.58  3.80 - 5.20 10e6/uL Final     Hemoglobin 04/25/2022 13.6  11.7 - 15.7 g/dL Final     Hematocrit 04/25/2022 41.1  35.0 - 47.0 % Final     MCV 04/25/2022 90  78 - 100 fL Final     MCH 04/25/2022 29.7  26.5 - 33.0 pg Final     MCHC 04/25/2022 33.1  31.5 - 36.5 g/dL Final     RDW 04/25/2022 12.9  10.0 - 15.0 % Final     Platelet Count 04/25/2022 252  150 - 450 10e3/uL Final     % Neutrophils 04/25/2022 72  % Final     % Lymphocytes 04/25/2022 17  % Final     % Monocytes 04/25/2022 8  % Final     % Eosinophils 04/25/2022 2  % Final     % Basophils 04/25/2022 1  % Final     % Immature Granulocytes 04/25/2022 0  % Final     Absolute Neutrophils 04/25/2022 5.9  1.6 - 8.3 10e3/uL Final     Absolute Lymphocytes 04/25/2022 1.4  0.8 - 5.3 10e3/uL Final     Absolute Monocytes 04/25/2022 0.6  0.0 - 1.3 10e3/uL Final     Absolute Eosinophils 04/25/2022 0.2  0.0 - 0.7 10e3/uL Final     Absolute Basophils 04/25/2022 0.1  0.0 - 0.2 10e3/uL Final     Absolute Immature Granulocytes 04/25/2022 0.0  <=0.4 10e3/uL Final             Answers for HPI/ROS submitted by the patient on 5/23/2022  If you checked off any problems, how difficult have these problems made it for you to do your work, take care of things at home, or get along with other people?: Somewhat difficult  PHQ9 TOTAL SCORE: 9  TIFFANY 7 TOTAL SCORE: 6

## 2022-05-23 NOTE — PATIENT INSTRUCTIONS
[facility]  [address] [place] [address]  Office: [phone]   Fax:    [phone]     Be patient with your brain chemistry - It will take a little while to heal.     Thank you so much or choosing [facility]  for your Health Care. It was a pleasure seeing you at your visit today! Please contact us with any questions or concerns you may have.                   Helga Ibarra MD                              To reach your [facility] care team after hours call:   [phone]    PLEASE NOTE OUR HOURS HAVE CHANGED secondary to COVID-19 coronavirus pandemic, as we are trying to minimize patient exposure to the virus,  which is now widespread in the [facility].  These hours may change with very little notice.  We apologize for any inconvenience.       Our current clinic hours are:          Monday- Thursday   7:00am - 6:00pm  in person.      Friday  7:00am- 5:00pm                       Saturday and Sunday : Closed to in person and virtual visits        We have telephone and virtual visit times available between    7:00am - 6pm on Monday-Friday as well.                                                Phone:  [phone]      Our pharmacy hours: Monday through Friday 8:00am to 5:00pm                        Saturday - 9:00 am to 12 noon       Sunday : Closed.              Phone:  [phone]              ###  Please note: at this time we are not accepting any walk-in visits. ###      There is also information available at our web site:  [email or web address]    If your provider ordered any lab tests and you do not receive the results within 10 business days, please call the clinic.    If you need a medication refill please contact your pharmacy.  Please allow 3 business days for your refill to be completed.    Our clinic offers telephone visits and e visits.  Please ask one of your team members to explain more.      Use [employer]  (secure email communication and access to your chart) to send your primary care provider a message or make an appointment. Ask someone on your Team how to sign up for MyChart.

## 2022-05-24 ENCOUNTER — VIRTUAL VISIT (OUTPATIENT)
Dept: PSYCHOLOGY | Facility: CLINIC | Age: 67
End: 2022-05-24
Payer: COMMERCIAL

## 2022-05-24 ENCOUNTER — VIRTUAL VISIT (OUTPATIENT)
Dept: PSYCHIATRY | Facility: CLINIC | Age: 67
End: 2022-05-24
Payer: COMMERCIAL

## 2022-05-24 DIAGNOSIS — G47.00 INSOMNIA, UNSPECIFIED TYPE: ICD-10-CM

## 2022-05-24 DIAGNOSIS — F39 MOOD DISORDER (H): Primary | ICD-10-CM

## 2022-05-24 DIAGNOSIS — F41.1 GAD (GENERALIZED ANXIETY DISORDER): Primary | ICD-10-CM

## 2022-05-24 PROCEDURE — 90832 PSYTX W PT 30 MINUTES: CPT | Mod: 95 | Performed by: PSYCHOLOGIST

## 2022-05-24 PROCEDURE — 99214 OFFICE O/P EST MOD 30 MIN: CPT | Mod: 95 | Performed by: PSYCHIATRY & NEUROLOGY

## 2022-05-24 RX ORDER — ARIPIPRAZOLE 2 MG/1
2 TABLET ORAL DAILY
Qty: 30 TABLET | Refills: 1 | Status: SHIPPED | OUTPATIENT
Start: 2022-05-24 | End: 2022-06-14 | Stop reason: SINTOL

## 2022-05-24 NOTE — PROGRESS NOTES
"Jolynn Haji is a 66 year old year old who is being evaluated via a billable video visit.      How would you like to obtain your AVS? MyChart  If you are dropped from the video visit, the video invite should be resent to: Text to cell phone: see Epic  Will anyone else be joining your video visit? No       Telemedicine Visit: The patient's condition can be safely assessed and treated via synchronous audio and visual telemedicine encounter.      Reason for Telemedicine Visit: Covid-19 Pandemic    Originating Site (Patient Location): Patient's home     Distant Site (Provider Location): Provider Remote Setting    Mode of Communication:  Video Conference via [employer]    As the provider I attest to compliance with applicable laws and regulations related to telemedicine.        Outpatient Psychiatric Progress Note    Name: Jolynn Haji   : 1955                    Primary Care Provider: Helga Ibarra MD   Therapist: None    PHQ-9 scores:  PHQ-9 SCORE 2022   PHQ-9 Total Score - - -   PHQ-9 Total Score MyChart 16 (Moderately severe depression) - 9 (Mild depression)   PHQ-9 Total Score 16 12 9   Some encounter information is confidential and restricted. Go to Review Flowsheets activity to see all data.       TIFFANY-7 scores:  TIFFANY-7 SCORE 2022   Total Score - - -   Total Score - 6 (mild anxiety) 6 (mild anxiety)   Total Score 8 6 6       Patient Identification:  Patient is a 66 year old,   White Choose not to answer female  who presents for return visit with me.  Patient is currently retired. Patient attended the phone/video session alone. Patient prefers to be called: \" Azalea\".    Interim History:  I last saw Jolynn Haji for outpatient psychiatry Consultation on 2022. During that appointment, we:      Continue Abilify/aripiprazole 5 mg daily to augment Lexapro.    Decrease Lexapro/excitalopram to 15 mg daily for anxiety, mood and to see if some of " "what you have been experiencing as possible side effects improves.      Consider individual psychotherapy for additional support and ongoing development of nonpharmacologic coping skills and strategies.    5/24: Pt overall with some improvement of her sxs since last visit.  Feeling a little less foggy and able to complete tasks a little better than before decreasing Lexapro dose.  However, still feeling a little fatigued, tired, and cognitively slowed.  No acute safety concerns.  No SI.  No problematic drug or alcohol use.    Per [facility], Dr. Eran Landon, during today's team-based visit:  Reported that she can focus \"a little longer than before\" and she feels she is \"staying in the present\" rather than worrying about the next thing to come. Her sleep is a little better. Instead of waking up at 1:30am, she sleeps until about 4:30am, uses the bathroom, and she can go back to sleep. She still feels tired during the day and estimated that it is about the same as before. She said that she is able to complete tasks. \"I won't win any races but I can get them done now.\" She noted that she had no concerns about her current medications.     Past Psychiatric Med Trials:  Psych Meds at Intake:  lexapro 20 mg daily  abilify 5 mg daily      Past Psych Meds:  Amitriptyline  Quetiapine  Mirtazapine - stopped \"because I was sleeping through the night\"    Psychiatric ROS:  Jolynn Haji reports mood has been: Slightly improved  Anxiety has been: Slightly improved  Sleep has been: Slightly improved  Seema sxs: None  Psychosis sxs: N/A  ADHD/ADD sxs: N/A  PTSD sxs: N/A  PHQ9 and GAD7 scores were reviewed today if completed.   Medication side effects: Yes: Feeling too numb and slowed?  Current stressors include: Symptoms and See HPI above  Coping mechanisms and supports include: Family, Hobbies and Friends    Current medications include:   Current Outpatient Medications   Medication Sig     ARIPiprazole (ABILIFY) 5 MG tablet Take 1 tablet (5 " mg) by mouth daily     clindamycin (CLINDAGEL) 1 % external gel Apply topically daily     doxylamine (UNISOM) 25 MG TABS tablet Take 25 mg by mouth At Bedtime Takes nightly     escitalopram (LEXAPRO) 10 MG tablet Take one tab (10 mg) by mouth WITH 5 mg tab for total dose of 15 mg daily.     escitalopram (LEXAPRO) 5 MG tablet Take one tab (5 mg) by mouth WITH 10 mg tab for total dose of 15 mg daily.     lactobacillus rhamnosus, GG, (CULTURELL) capsule Take 1 capsule by mouth 2 times daily     lisinopril (ZESTRIL) 2.5 MG tablet Take 1 tablet (2.5 mg) by mouth daily     No current facility-administered medications for this visit.       Past Medical/Surgical History:  Past Medical History:   Diagnosis Date     Abnormal glandular Papanicolaou smear of cervix- ASCUS in 2000 12/5/2003    ASC H- 2000     Basal cell carcinoma of leg, right 05/2016    Dr Rogers     Colon polyps 05/2018    tubular adenoam - due 5 yrs - 2 mm     Concussion without loss of consciousness 4/12/2017     Hidradenitis     left  groin     HSIL (high grade squamous intraepithelial lesion) on Pap smear of cervix 7/5/2000    Normal paps from 2001 to 2016/cc     Hypertension goal BP (blood pressure) < 140/90      OA (osteoarthritis) of knee     moderate     Postmenopausal since 7/2008      Shingles 7/23/2010    left      Squamous cell carcinoma of skin, unspecified      Synovial cyst of popliteal space       has a past medical history of Abnormal glandular Papanicolaou smear of cervix- ASCUS in 2000 (12/5/2003), Basal cell carcinoma of leg, right (05/2016), Colon polyps (05/2018), Concussion without loss of consciousness (4/12/2017), Hidradenitis, HSIL (high grade squamous intraepithelial lesion) on Pap smear of cervix (7/5/2000), Hypertension goal BP (blood pressure) < 140/90, OA (osteoarthritis) of knee, Postmenopausal (since 7/2008 ), Shingles (7/23/2010), Squamous cell carcinoma of skin, unspecified, and Synovial cyst of popliteal space.    She has  no past medical history of Malignant melanoma (H) or Squamous cell carcinoma.    Social History:  Reviewed. No changes to social history except as noted above in HPI.    Vital Signs:   None. This is phone/video visit.     Labs:  Most recent laboratory results reviewed and the pertinent results include:   TSH   Date Value Ref Range Status   04/25/2022 1.30 0.40 - 4.00 mU/L Final   01/13/2021 2.07 0.40 - 4.00 mU/L Final     Lab Results   Component Value Date    WBC 8.1 04/25/2022    WBC 6.4 01/13/2021     Lab Results   Component Value Date    RBC 4.58 04/25/2022    RBC 4.54 01/13/2021     Lab Results   Component Value Date    HGB 13.6 04/25/2022    HGB 13.3 01/13/2021     Lab Results   Component Value Date    HCT 41.1 04/25/2022    HCT 41.7 01/13/2021     No components found for: MCT  Lab Results   Component Value Date    MCV 90 04/25/2022    MCV 92 01/13/2021     Lab Results   Component Value Date    MCH 29.7 04/25/2022    MCH 29.3 01/13/2021     Lab Results   Component Value Date    MCHC 33.1 04/25/2022    MCHC 31.9 01/13/2021     Lab Results   Component Value Date    RDW 12.9 04/25/2022    RDW 13.1 01/13/2021     Lab Results   Component Value Date     04/25/2022     01/13/2021     Last Comprehensive Metabolic Panel:  Sodium   Date Value Ref Range Status   04/25/2022 137 133 - 144 mmol/L Final   01/13/2021 140 133 - 144 mmol/L Final     Potassium   Date Value Ref Range Status   04/25/2022 4.4 3.4 - 5.3 mmol/L Final   01/13/2021 4.1 3.4 - 5.3 mmol/L Final     Chloride   Date Value Ref Range Status   04/25/2022 105 94 - 109 mmol/L Final   01/13/2021 108 94 - 109 mmol/L Final     Carbon Dioxide   Date Value Ref Range Status   01/13/2021 28 20 - 32 mmol/L Final     Carbon Dioxide (CO2)   Date Value Ref Range Status   04/25/2022 26 20 - 32 mmol/L Final     Anion Gap   Date Value Ref Range Status   04/25/2022 6 3 - 14 mmol/L Final   01/13/2021 4 3 - 14 mmol/L Final     Glucose   Date Value Ref Range Status    04/25/2022 104 (H) 70 - 99 mg/dL Final   01/13/2021 91 70 - 99 mg/dL Final     Comment:     Fasting specimen     Urea Nitrogen   Date Value Ref Range Status   04/25/2022 23 7 - 30 mg/dL Final   01/13/2021 18 7 - 30 mg/dL Final     Creatinine   Date Value Ref Range Status   04/25/2022 0.78 0.52 - 1.04 mg/dL Final   01/13/2021 0.68 0.52 - 1.04 mg/dL Final     GFR Estimate   Date Value Ref Range Status   04/25/2022 83 >60 mL/min/1.73m2 Final     Comment:     Effective December 21, 2021 eGFRcr in adults is calculated using the 2021 CKD-EPI creatinine equation which includes age and gender (Mercedes et al., NE, DOI: 10.1056/AIZFav2957243)   01/13/2021 >90 >60 mL/min/[1.73_m2] Final     Comment:     Non  GFR Calc  Starting 12/18/2018, serum creatinine based estimated GFR (eGFR) will be   calculated using the Chronic Kidney Disease Epidemiology Collaboration   (CKD-EPI) equation.       Calcium   Date Value Ref Range Status   04/25/2022 9.3 8.5 - 10.1 mg/dL Final   01/13/2021 8.8 8.5 - 10.1 mg/dL Final     Bilirubin Total   Date Value Ref Range Status   04/25/2022 0.4 0.2 - 1.3 mg/dL Final   01/13/2021 0.8 0.2 - 1.3 mg/dL Final     Alkaline Phosphatase   Date Value Ref Range Status   04/25/2022 64 40 - 150 U/L Final   01/13/2021 72 40 - 150 U/L Final     ALT   Date Value Ref Range Status   04/25/2022 21 0 - 50 U/L Final   01/13/2021 17 0 - 50 U/L Final     AST   Date Value Ref Range Status   04/25/2022 11 0 - 45 U/L Final   01/13/2021 13 0 - 45 U/L Final     Review of Systems:  10 systems (general, cardiovascular, respiratory, eyes, ENT, endocrine, GI, , M/S, neurological) were reviewed. Most pertinent finding(s) is/are: Some chronic pains. The remaining systems are all unremarkable.    Mental Status Examination (limited as this is by phone/video):  Appearance: Awake, alert, appears stated age, no acute distress, well-groomed   Attitude:  cooperative, pleasant   Motor: Psychomotor slowing, not formally  tested  Oriented to:  person, place, time, and situation  Attention Span and Concentration:  normal  Speech:  A little delayed/hesitant responses perhaps?  Dysarthria?    Normal volume  Language: intact  Mood:  A little better  Affect:  intensity is flat  Associations:  no loose associations  Thought Process:  logical, linear and goal oriented  Thought Content:  no evidence of suicidal ideation or homicidal ideation, no evidence of psychotic thought, no auditory hallucinations present and no visual hallucinations present  Recent and Remote Memory:  Not formally assessed. Possibly some deficits? Could consider neuropsychological testing recommendation.  Fund of Knowledge: appropriate  Insight:  fair  Judgment:  intact, adequate for safety  Impulse Control:  intact    Suicide Risk Assessment:  Today Jolynn Haji reports no suicidal ideation. Based on all available evidence including the factors cited above, Jolynn Haji does not appear to be at imminent risk for self-harm, does not meet criteria for a 72-hr hold, and therefore remains appropriate for ongoing outpatient level of care.  A thorough assessment of risk factors related to suicide and self-harm have been reviewed and are noted above. The patient convincingly denies suicidality on several occasions. Local community safety resources printed and reviewed for patient to use if needed. There was no deceit detected, and the patient presented in a manner that was believable.     DSM5 Diagnosis:  300.02 (F41.1) Generalized Anxiety Disorder   R/O Mood Disorder  Insomnia, unspecified    Medical comorbidities include:   Patient Active Problem List    Diagnosis Date Noted     Essential hypertension with goal blood pressure less than 140/90 07/16/2001     Priority: High     Adjustment insomnia 12/23/2021     Priority: Medium     Screening for cervical cancer      Priority: Medium     1999 NIL  2000 ASCUS-H.  >> Colpo: Neg  2001 NIL   2003 NIL pap, Neg HPV  [id number], 2016   NIL pap  7/25/19 NIL pap, Neg HPV    Criteria necessary to stop screening per ASCCP guidelines:  Older than 65 years  Three consecutive negative cytology results or two consecutive negative cotest results within the previous 10 years, with the most recent being performed within the last 5 years.   (Women with hx of MADDIE 2 or 3, or adenocarcinoma in situ should continue screening for full 20 years, even if this goes past age 65).         Hyperlipidemia LDL goal <130 11/30/2018     Priority: Medium     Basal cell carcinoma      Priority: Medium     Colon polyps 05/01/2018     Priority: Medium     Primary localized osteoarthrosis of left lower leg 02/19/2018     Priority: Medium     Other secondary osteoarthritis of left knee 01/29/2018     Priority: Medium     Chronic constipation 09/14/2017     Priority: Medium     Raynaud's phenomenon without gangrene 09/14/2017     Priority: Medium     Insomnia, unspecified type 07/25/2017     Priority: Medium     TIFFANY (generalized anxiety disorder) 05/23/2017     Priority: Medium     Adjustment disorder with mixed anxiety and depressed mood- mild-moderate  at this time 05/11/2017     Priority: Medium     Family history of celiac disease 05/11/2017     Priority: Medium     Basal cell carcinoma of leg, right 05/01/2016     Priority: Medium     Sun-damaged skin 04/21/2016     Priority: Medium     Seasonal allergic rhinitis 04/21/2016     Priority: Medium     Dupuytren's disease of palm - right 4th digit flexor tendon 06/11/2015     Priority: Medium     History of migraine headaches- assoc. with nausea/vomiting - resolved around menopause - were  premenstrual  01/08/2014     Priority: Medium     Rosacea 05/01/2013     Priority: Medium     Numbness of feet- distal feet R>L  02/21/2011     Priority: Medium     NUMBNESS AND TINGLING OF FOOT - bilateral -mild  08/25/2010     Priority: Medium     Osteopenia 01/02/2009     Priority: Medium     Lipidosis 10/24/2006     Priority: Medium      Problem list name updated by automated process. Provider to review       Localized osteoarthritis of hand 11/01/2005     Priority: Medium     Problem list name updated by automated process. Provider to review       Stress incontinence, female - mild 10/23/2012     Priority: Low     Cough 10/23/2012     Priority: Low     Acute bronchitis- recurrent- ? related to mold in school building- pt has since retired and no further bronchitis 10/23/2012     Priority: Low     Postmenopausal      Priority: Low     DERMATOPHYTOSIS OF NAIL- toenails  03/14/2006     Priority: Low     trichophyton rubrum on culture - 1/06        Synovial cyst of popliteal space      Priority: Low     Hidradenitis 12/05/2003     Priority: Low       Psychosocial & Contextual Factors: see HPI above    Assessment:  From Intake, 5/12/2022:  Jolynn Haji is a 66-year-old female with a history including anxiety, depression, insomnia status post concussion in April 2017.  No major mental health struggles prior to concussion in 2017.  Patient was started on Lexapro, quetiapine, mirtazapine to help with her symptoms.  Patient had been feeling much better on that medication combination and once she was sleeping well again quetiapine and mirtazapine were discontinued.  This past January the patient started to have some worsening symptoms and Abilify was added.  Patient has felt like her current medications of Lexapro and Abilify have been helpful but reports starting to feel really slowed and numb the past several weeks.  She also reported a weight loss of nearly 20 pounds over the last 6 weeks versus several months (patient was not very sure). I am wondering if she could be experiencing side effects of her increased Lexapro dose.  She is agreeable to decreasing her dose slightly to 15 mg daily.  Could also consider decreasing Abilify in the case it is not her Lexapro.  She also desires to sleep a little bit better than she has been.  Could consider restarting  mirtazapine at bedtime for sleep and to also augment her Lexapro.  Could also be beneficial for patient to undergo cognitive screening to check in and see what her baseline is status post concussion.  No acute safety concerns.  No SI.  No problematic drug or alcohol use.    5/24/2022:  Patient with some slight improvements of possible medication related side effects after decreasing Lexapro.  I am wondering if there might be more robust improvements with decreasing Abilify/aripiprazole.  Possible drug-induced Parkinsonism?  She does seem to have delayed/slowed speech, masked face use.  Her gait and other movements unable to be observed but she does report other struggles that may be consistent with such a presentation.  We are going to decrease her Abilify down to 2 mg daily and I will see her back in just a few weeks.  We will likely discontinue her Abilify at her next visit.  I will message her primary care provider to get some collateral regarding previous baseline functioning prior to starting Abilify.  History of TBI could be confounding presentation.  Could consider neurology consult.    Medication side effects and alternatives were reviewed. Health promotion activities recommended and reviewed today. All questions addressed. Education and counseling completed regarding risks and benefits of medications and psychotherapy options. Recommend therapy for additional support.     Treatment Plan:    Continue Lexapro 15 mg daily for anxiety, mood.    Decrease Abilify/aripiprazole to 2 mg daily for mood and to augment Lexapro (and to see if some of what you have been experiencing as possible side effects improves).      Continue all other cares per primary care provider.     Continue all other medications as reviewed per electronic medical record today.     Safety plan reviewed. To the Emergency Department as needed or call after hours crisis line at [phone] or [phone]. [place] Crisis Text Line. Text [place] to  337460 or Suicide LifeLine Chat: suicidepreventionlifeline.org/chat    Consider individual psychotherapy for additional support and ongoing development of nonpharmacologic coping skills and strategies.    Schedule an appointment with me in 3 weeks or sooner as needed. Call [facility] at [phone] to schedule.    Follow up with primary care provider as planned or for acute medical concerns.    Call the psychiatric nurse line with medication questions or concerns at [phone].    [employer] may be used to communicate with your provider, but this is not intended to be used for emergencies.    Administrative Billing:   Phone Call/Video Duration: 13 Minutes  Start: 11:05a  Stop: 11:18a    Time spent with patient was 13 minutes and greater than 50% of time or 7 minutes was spent in counseling and coordination of care regarding above diagnoses and treatment plan.    Patient Status:  Patient will continue to be seen for ongoing consultation and stabilization.    Signed:   Judy Pompa DO  [facility]    Disclaimer: This note consists of symbols derived from keyboarding, dictation and/or voice recognition software. As a result, there may be errors in the script that have gone undetected. Please consider this when interpreting information found in this chart.

## 2022-05-24 NOTE — Clinical Note
Hi Dr. Ibarra -I was wondering if I might get some feedback from you regarding Azalea's baseline. So far she has presented with pretty delayed and slowed speech.  She is pretty flat in affect as well.  I know she has history of the TBI but I am also worried about possible drug-induced parkinsonism with Abilify use.  I am just wondering what her baseline might have been prior to TBI versus after TBI regarding some of these features?  I would really appreciate any information you can offer in this context.  Thanks!

## 2022-05-24 NOTE — Clinical Note
Please call this patient to get them scheduled for a follow-up visit in 3 weeks. Please schedule with me and the [facility]. Thanks!

## 2022-05-24 NOTE — PROGRESS NOTES
[facility] Psychiatry Service  May 24, 2022      Behavioral Health Clinician Progress Note    Patient Name: Jolynn Haji           Service Type:  Individual      Service Location:   [facility]     Session Start Time: 10:30am  Session End Time: 10:50am      Session Length: 16 - 37      Attendees: Patient     Service Modality:  Video Visit:      Provider verified identity through the following two step process.  Patient provided:  Patient is known previously to provider    Telemedicine Visit: The patient's condition can be safely assessed and treated via synchronous audio and visual telemedicine encounter.      Reason for Telemedicine Visit: Services only offered telehealth    Originating Site (Patient Location): Patient's home    Distant Site (Provider Location): Provider Remote Setting- Home Office    Consent:  The patient/guardian has verbally consented to: the potential risks and benefits of telemedicine (video visit) versus in person care; bill my insurance or make self-payment for services provided; and responsibility for payment of non-covered services.     Patient would like the video invitation sent by:  My Chart    Mode of Communication:  Video Conference via [facility]    As the provider I attest to compliance with applicable laws and regulations related to telemedicine.    Visit Activities (Refresh list every visit): [facility] Only    Diagnostic Assessment Date: 05/12/2022  Treatment Plan Review Date: 08/24/2022  See Flowsheets for today's PHQ-9 and TIFFANY-7 results  Previous PHQ-9:   PHQ-9 SCORE 4/25/2022 5/9/2022 5/23/2022   PHQ-9 Total Score - - -   PHQ-9 Total Score MyChart 16 (Moderately severe depression) - 9 (Mild depression)   PHQ-9 Total Score 16 12 9   Some encounter information is confidential and restricted. Go to Review Flowsheets activity to see all data.     Previous TIFFANY-7:   TIFFANY-7 SCORE 5/9/2022 5/12/2022 5/23/2022   Total Score - - -   Total Score - 6 (mild anxiety) 6 (mild  "anxiety)   Total Score 8 6 6       KARINA LEVEL:  KARINA Score (Last Two) 8/27/2010 2/22/2011   KARINA Raw Score 51 49   Activation Score 91.6 82.8   KARINA Level 4 4       DATA  Extended Session (60+ minutes): No  Interactive Complexity: No  Crisis: No  [facility] Patient: No    Treatment Objective(s) Addressed in This Session:  Target Behavior(s): anxiety    Anxiety: will develop more effective coping skills to manage anxiety symptoms    Current Stressors / Issues:  Reported that she can focus \"a little longer than before\" and she feels she is \"staying in the present\" rather than worrying about the next thing to come. Her sleep is a little better. Instead of waking up at 1:30am, she sleeps until about 4:30am, uses the bathroom, and she can go back to sleep. She still feels tired during the day and estimated that it is about the same as before. She said that she is able to complete tasks. \"I won't win any races but I can get them done now.\" She noted that she had no concerns about her current medications.      05/12/2022 (DA):  \"I'm hoping we can figure out what triggered this setback.\" She was diagnosed with anxiety and depression after a concussion a concussion in 04/2017. She had been sick at the time, got up one night and fell. She is uncertain where on her head she landed. She was on Lexapro, mirtazapine, and seroquel. She thought that it was \"fixed\", stopped taking the mirtazapine and seroquel but stayed on the lexapro. In January, started feeling more anxiety. PCP added Abilify which was helpful. The Lexapro was increased which she is not sure if it has been helpful (been on it for 4 weeks). She said, \"I feel like I'm in la-la land. I have a hard time thinking things through.\" Her anxiety and sleep is better but bothered by the slowed thinking.       Progress on Treatment Objective(s) / Homework:  Minimal progress - ACTION (Actively working towards change); Intervened by reinforcing change plan / affirming steps taken    Also " provided psychoeducation about behavioral health condition, symptoms, and treatment options    Care Plan review completed: Yes    Medication Review:  Changes to psychiatric medications, see updated Medication List in EPIC.     Medication Compliance:  Yes    Changes in Health Issues:   None reported    Chemical Use Review:   Substance Use: Chemical use reviewed, no active concerns identified      Tobacco Use: No current tobacco use.      Assessment: Current Emotional / Mental Status (status of significant symptoms):  Risk status (Self / Other harm or suicidal ideation)  Patient denies a history of suicidal ideation, suicide attempts, self-injurious behavior, homicidal ideation, homicidal behavior and and other safety concerns  Patient denies current fears or concerns for personal safety.  Patient denies current or recent suicidal ideation or behaviors.  Patient denies current or recent homicidal ideation or behaviors.  Patient denies current or recent self injurious behavior or ideation.  Patient denies other safety concerns.  A safety and risk management plan has not been developed at this time, however patient was encouraged to call [facility] should there be a change in any of these risk factors.    Appearance:   Appropriate   Eye Contact:   Good   Psychomotor Behavior: Normal   Attitude:   Cooperative   Orientation:   All  Speech   Rate / Production: Normal    Volume:  Normal   Mood:    Anxious  Normal  Affect:    Restricted   Thought Content:  Clear   Thought Form:  Coherent  Logical   Insight:    Fair     Diagnoses:  1. TIFFANY (generalized anxiety disorder)    2. Insomnia, unspecified type        Collateral Reports Completed:  Communicated with: Dr. Pompa    Plan: (Homework, other):  Patient was given information about behavioral services and encouraged to schedule a follow up appointment with the clinic [facility] in conjunction with next [facility] appointment.  She was also given information about mental health  symptoms and treatment options .  CD Recommendations: No indications of CD issues.  Eran Landon, PsyD, LP      ______________________________________________________________________    [facility] Psychiatry Service Treatment Plan    Patient's Name: Jolynn Haji  YOB: 1955    Date of Creation: May 24, 2022  Date Treatment Plan Last Reviewed/Revised: May 24, 2022    DSM5 Diagnoses: 300.02 (F41.1) Generalized Anxiety Disorder or 780.52 (G47.00) Insomnia Disorder   With non-sleep disorder mental comorbidity  Persistent    Psychosocial / Contextual Factors: family  PROMIS (reviewed every 90 days):   PROMIS 10-Global Health (only subscores and total score):   PROMIS-10 Scores Only 5/12/2022 5/12/2022   Global Mental Health Score 9 9   Global Physical Health Score 17 17   PROMIS TOTAL - SUBSCORES 26 26       Referral / Collaboration:  Referral to another professional/service is not indicated at this time..    Anticipated number of session for this episode of care: 5-6  Anticipation frequency of session: As determined by Dr. Pompa  Anticipated Duration of each session: 16-37 minutes  Treatment plan will be reviewed in 90 days or when goals have been changed.       MeasurableTreatment Goal(s) related to diagnosis / functional impairment(s)  Goal 1: Patient will work with providers to manage symptoms    I will know I've met my goal when I can finish my tasks.      Objective #A (Patient Action)  Patient will attend all appointments, take medication as prescribed.  Status: New - Date: 05/24/2022     Intervention(s)  [facility] will Monitor and assist in overcoming barriers to treatment adherence    Objective #B  Patient will consider all recommendations offered.  Status: New - Date: 05/24/2022      Intervention(s)  [facility] will educate patient on treatment options, clarify concerns, work with pt to overcome any resistance to compliance.      Patient has reviewed and agreed to the above  plan.      Anthony Landon PsyD  May 24, 2022

## 2022-05-24 NOTE — PATIENT INSTRUCTIONS
Treatment Plan:  Continue Lexapro 15 mg daily for anxiety, mood.  Decrease Abilify/aripiprazole to 2 mg daily for mood and to augment Lexapro (and to see if some of what you have been experiencing as possible side effects improves).    Continue all other cares per primary care provider.   Continue all other medications as reviewed per electronic medical record today.   Safety plan reviewed. To the Emergency Department as needed or call after hours crisis line at [phone] or [phone]. [place] Crisis Text Line. Text [place] to 350502 or Suicide LifeLine Chat: suicidepreventionlifeline.org/chat  Consider individual psychotherapy for additional support and ongoing development of nonpharmacologic coping skills and strategies.  Schedule an appointment with me in 3 weeks or sooner as needed. Call [facility] at [phone] to schedule.  Follow up with primary care provider as planned or for acute medical concerns.  Call the psychiatric nurse line with medication questions or concerns at [phone].  [employer] may be used to communicate with your provider, but this is not intended to be used for emergencies.

## 2022-06-08 ENCOUNTER — OFFICE VISIT (OUTPATIENT)
Dept: FAMILY MEDICINE | Facility: CLINIC | Age: 67
End: 2022-06-08
Payer: COMMERCIAL

## 2022-06-08 VITALS
OXYGEN SATURATION: 99 % | HEART RATE: 102 BPM | RESPIRATION RATE: 16 BRPM | HEIGHT: 68 IN | WEIGHT: 131 LBS | SYSTOLIC BLOOD PRESSURE: 138 MMHG | TEMPERATURE: 97.9 F | BODY MASS INDEX: 19.85 KG/M2 | DIASTOLIC BLOOD PRESSURE: 80 MMHG

## 2022-06-08 DIAGNOSIS — R41.843 PSYCHOMOTOR RETARDATION: ICD-10-CM

## 2022-06-08 DIAGNOSIS — F43.23 ADJUSTMENT DISORDER WITH MIXED ANXIETY AND DEPRESSED MOOD: Primary | ICD-10-CM

## 2022-06-08 DIAGNOSIS — F51.02 ADJUSTMENT INSOMNIA: ICD-10-CM

## 2022-06-08 DIAGNOSIS — F41.1 GAD (GENERALIZED ANXIETY DISORDER): ICD-10-CM

## 2022-06-08 PROCEDURE — 99214 OFFICE O/P EST MOD 30 MIN: CPT | Performed by: FAMILY MEDICINE

## 2022-06-08 ASSESSMENT — ANXIETY QUESTIONNAIRES
5. BEING SO RESTLESS THAT IT IS HARD TO SIT STILL: MORE THAN HALF THE DAYS
4. TROUBLE RELAXING: SEVERAL DAYS
8. IF YOU CHECKED OFF ANY PROBLEMS, HOW DIFFICULT HAVE THESE MADE IT FOR YOU TO DO YOUR WORK, TAKE CARE OF THINGS AT HOME, OR GET ALONG WITH OTHER PEOPLE?: VERY DIFFICULT
3. WORRYING TOO MUCH ABOUT DIFFERENT THINGS: SEVERAL DAYS
GAD7 TOTAL SCORE: 6
GAD7 TOTAL SCORE: 6
1. FEELING NERVOUS, ANXIOUS, OR ON EDGE: SEVERAL DAYS
2. NOT BEING ABLE TO STOP OR CONTROL WORRYING: SEVERAL DAYS
6. BECOMING EASILY ANNOYED OR IRRITABLE: NOT AT ALL
GAD7 TOTAL SCORE: 6
7. FEELING AFRAID AS IF SOMETHING AWFUL MIGHT HAPPEN: NOT AT ALL
7. FEELING AFRAID AS IF SOMETHING AWFUL MIGHT HAPPEN: NOT AT ALL

## 2022-06-08 ASSESSMENT — PATIENT HEALTH QUESTIONNAIRE - PHQ9
10. IF YOU CHECKED OFF ANY PROBLEMS, HOW DIFFICULT HAVE THESE PROBLEMS MADE IT FOR YOU TO DO YOUR WORK, TAKE CARE OF THINGS AT HOME, OR GET ALONG WITH OTHER PEOPLE: SOMEWHAT DIFFICULT
SUM OF ALL RESPONSES TO PHQ QUESTIONS 1-9: 7
SUM OF ALL RESPONSES TO PHQ QUESTIONS 1-9: 7

## 2022-06-08 NOTE — PATIENT INSTRUCTIONS
" [facility]  [address] [place] [address]  Office: [phone]   Fax:    [phone]     For foot cramps:     Stay well hydrated.  Try magnesium 250mg daily to help - can take at night.       For knee: ice in front and behind:     Go to lunch with your friends tomorrow.  Don't feel pressured to talk.  Just tell them at the beginning, that your glad to be with them and grateful for their support.   \"I'm very blessed to have great friends.\"  I might not talk very much, but I'm enjoying just being with you.     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     Stay patient.  You are getting better, even just a little bit every day.  Some days will be better than others.  Pay attention to your feelings of fatigue.  It's ok to rest and close your eyes for a while, when you need to.     Try to walk a little bit each day.  Enjoy some nature every day.          For knees:        Unless you re told otherwise, start by doing each exercise 5 to 10 times (5 to 10 reps) twice a day (2 sets). As you get stronger, slowly increase the number of reps and sets.  Stop any exercise that causes sharp or increased knee pain, dizziness, shortness of breath, or chest pain.  Leg and Knee Exercises: Leg Lunge     This exercise is designed to stretch and strengthen your knee. Before beginning, read through all the instructions. Start with a warm-up, which can help prevent muscle soreness and improve coordination so you do not fall. While exercising, breathe normally and use smooth movements. If you feel any pain, stop the exercise. If pain persists, call your healthcare provider.  After a brief warm-up, such as brisk walking for a few minutes, stand with your feet shoulder-width apart.  With your _______ foot, step out and lower yourself into a comfortable position. Keep your back straight and your feet pointing straight ahead. As you step, the " heel of the other foot lifts off the floor.  Return smoothly to your starting position.  Repeat ______ times. Do ______ sets a day.  Caution  Don t let your forward knee go past your toes.  Don t lunge so far that your rear knee touches the floor.  Keep knees in line with the second toe.   Date Last Reviewed: 9/3/2015    0256-3337 [employer]. [address] [place] [address]. All rights reserved. This information is not intended as a substitute for professional medical care. Always follow your healthcare professional's instructions.        Wall Squats    After you regain muscle control, it s time to build strength. This helps you put full weight on your leg. For best results, warm up and stretch before starting. If your injury is recent, wait until swelling and pain decrease before doing this exercise:  Lean against a wall with your feet hip-width apart. Your feet should be about 18 inches from the wall.  Slowly slide down to a near-sitting position. Don t let your knees go past 90 degrees.  Hold for 10 seconds, then slide back up.  Repeat 5 times.     CAUTION: Do this exercise only if your healthcare provider says it s OK.   Date Last Reviewed: 10/1/2015    7414-6487 [employer]. [address] [place] [address]. All rights reserved. This information is not intended as a substitute for professional medical care. Always follow your healthcare professional's instructions.        Knee Rehabilitation: Free Squat    Once you can bear weight on your leg without pain, start adding advanced exercises to your workout. Before you begin, talk with a physical therapist or certified athletic trainer. Learn how to use equipment the right way. Start slowly, and rest between each set. As you feel stronger, increase the number of sets.  Caution: Ask your healthcare provider if you re ready to do this exercise. If you do too much too soon, you could create new knee problems, or even  reinjure your knee.   Stand with your legs shoulder-width apart, feet flat, and toes pointed slightly out.  Keeping your back straight and heels on the floor, bend down from your knees. Don t bend past 90 degrees, or so far that it causes pain.  Keep your knees behind the plane of your toes.  Hold for 1 second(s). Then slowly rise back up.  Do 2 sets of 10 repetitions.  Date Last Reviewed: 6/1/2015 2000-2017 [employer]. [address] [place] [address]. All rights reserved. This information is not intended as a substitute for professional medical care. Always follow your healthcare professional's instructions.        Leg and Knee Exercises: Step-Ups    This exercise is designed to stretch and strengthen your knee. Before starting, read through all the instructions. While exercising, breathe normally and use smooth movements. If you feel any pain, stop the exercise. If pain persists, tell your healthcare provider.  After a brief warm-up, such as brisk walking for a few minutes, stand with one foot on a 3-inch to 5-inch support (such as a block of wood) and the other foot flat on the floor.  Shift your weight onto the foot on the block, straightening that knee and raising your other foot off the floor. Then slowly lower the foot until only the heel touches the floor.  Return to starting position.  Repeat ______ times on each side. Do ______ sets a day.  Don t lock your knees.  Keep your weight on the foot that s on the block-- don t push off from the floor.  Date Last Reviewed: 2/1/2018 2000-2018 [employer]. [address] [place] [address]. All rights reserved. This information is not intended as a substitute for professional medical care. Always follow your healthcare professional's instructions.           Patient Education     Gastrocnemius Stretch (Flexibility)    Stand facing a wall from 3 feet away. Take one step toward the wall with your right foot.  Place  both palms on the wall. Bend your right knee.  Lean forward, keeping the left leg straight and the left heel on the floor.  Hold for 30 to 60 seconds. Repeat 2 times, or as instructed.  Switch legs and repeat.  Date Last Reviewed: 3/10/2016    2261-1740 [employer]. [address] [place] [address]. All rights reserved. This information is not intended as a substitute for professional medical care. Always follow your healthcare professional's instructions.           Patient Education     Hamstring Stretch (Flexibility)    Sit on the floor with your right leg straight in front of you. Bend your left leg so the sole of your foot rests against the inside of your right thigh.  Reach toward your ankle. Keep your knee, neck, and back straight. Feel the stretch in the back of your thigh.  Hold for 30 to 60 seconds. Repeat 2 times, or as instructed.  Switch legs and repeat.  Repeat this exercise 3 times per day, or as instructed.     Tip: Don t bounce while you re stretching.   Date Last Reviewed: 3/10/2016    9647-9688 [employer]. [address] [place] [address]. All rights reserved. This information is not intended as a substitute for professional medical care. Always follow your healthcare professional's instructions.           Patient Education     Heel Slides    This exercise is for an injured right knee. Switch sides if the injury is to your left knee.  Sit on the floor with your legs straight in front of you.  Slide your right heel along the floor toward you, bending your knee and keeping your foot flexed.  Move it as close to you as you comfortably can. Hold for 5 to 10 seconds. Then slide your heel back.  Repeat 5 times, or as instructed.     Tip: If you re sitting on carpet, put a plastic bag under your heel to make it slide more easily. If you re sitting on a hard floor, put a small towel under your heel.   Date Last Reviewed: 3/10/2016    2689-6399 [employer]. [address] [place] [address]. All rights reserved. This information is not intended as a substitute for professional medical care. Always follow your healthcare professional's instructions.           Patient Education     Quadruped Hip Abduction (Strength)    These instructions are for your right leg. Switch sides for your left leg.  Get down on the floor on your hands and knees.  Lift your right leg up and out to the side. Keep the knee bent. Raise your leg as high as is comfortable. Hold for 5 seconds.  Slowly lower your leg back to the floor.  Repeat 10 times, or as instructed.  Date Last Reviewed: 5/1/2016 2000-2018 [employer]. [address] [place] [address]. All rights reserved. This information is not intended as a substitute for professional medical care. Always follow your healthcare professional's instructions.           Patient Education     Hip Adduction (Strength)    These instructions are for your right foot. Switch sides for your left foot.  Lie on your right side on the floor. Keep your right leg straight. Bend your left leg and put your left foot flat on the floor behind your right knee.  Raise your right leg as high as you comfortably can. Hold for 5 seconds, then lower it back down.  Repeat 10 times, or as instructed.  Switch legs and repeat.  Date Last Reviewed: 3/10/2016    9942-2040 [employer]. [address] [place] [address]. All rights reserved. This information is not intended as a substitute for professional medical care. Always follow your healthcare professional's instructions.           Patient Education     Hip Adductor Stretch (Flexibility)    Sit on the floor. Put the soles of your feet together so your knees are pointed outward.  Pull your heels in toward your groin, as close as is comfortable.  Put your hands on your knees, and gently push them closer to the floor.  Hold for 30 to 60 seconds.  Relax and  repeat 2 times, or as instructed.  Repeat this exercise 3 times a day, or as instructed.  Date Last Reviewed: 3/10/2016    3126-2202 [employer]. [address] [place] [address]. All rights reserved. This information is not intended as a substitute for professional medical care. Always follow your healthcare professional's instructions.           Patient Education     Hip Extension (Strength)    Lie on your back on the floor with your knees bent and feet flat on the floor. Put your arms at your side, palms flat on the floor.  Tighten your core muscles and keep them tight through the whole exercise.  Push down on your feet and raise your hips and lift your buttocks off the floor.  Don t arch your back.  Hold for 5 seconds.  Lower your buttocks back down to the floor.  Repeat 5 to 10 times, or as instructed.  Date Last Reviewed: 3/10/2016    4070-5504 [employer]. [address] [place] [address]. All rights reserved. This information is not intended as a substitute for professional medical care. Always follow your healthcare professional's instructions.      Thank you so much or choosing [facility]  for your Health Care. It was a pleasure seeing you at your visit today! Please contact us with any questions or concerns you may have.                   Helga Ibarra MD                              To reach your [facility] care team after hours call:   [phone]    PLEASE NOTE OUR HOURS HAVE CHANGED secondary to COVID-19 coronavirus pandemic, as we are trying to minimize patient exposure to the virus,  which is now widespread in the [facility].  These hours may change with very little notice.  We apologize for any inconvenience.       Our current clinic hours are:          Monday- Thursday   7:00am - 6:00pm  in person.      Friday  7:00am- 5:00pm                       Saturday and Sunday : Closed to in person and  virtual visits        We have telephone and virtual visit times available between    7:00am - 6pm on Monday-Friday as well.                                                Phone:  [phone]      Our pharmacy hours: Monday through Friday 8:00am to 5:00pm                        Saturday - 9:00 am to 12 noon       Sunday : Closed.              Phone:  [phone]              ###  Please note: at this time we are not accepting any walk-in visits. ###      There is also information available at our web site:  [email or web address]    If your provider ordered any lab tests and you do not receive the results within 10 business days, please call the clinic.    If you need a medication refill please contact your pharmacy.  Please allow 3 business days for your refill to be completed.    Our clinic offers telephone visits and e visits.  Please ask one of your team members to explain more.      Use [employer] (secure email communication and access to your chart) to send your primary care provider a message or make an appointment. Ask someone on your Team how to sign up for [employer].

## 2022-06-08 NOTE — PROGRESS NOTES
Assessment & Plan :       ICD-10-CM    1. Adjustment disorder with mixed anxiety and depressed mood- mild-moderate  at this time  F43.23    2. Adjustment insomnia  F51.02    3. TIFFANY (generalized anxiety disorder)  F41.1           41 minutes spent on the date of the encounter doing mostly counseling with patient, chart review, history and exam, documentation and further activities per the note       Pt prefers to continue to come see me for summary counseling and review of treatment and visits with her therapist and psychiatrist as I've been her primary MD since 2001 even though I'm not a trained therapist/counselor and I remind her of that at every visit.     Return in 12 days (on 6/20/2022) for depression/anxiety follow up, w/ Dr. SUE for 40 minute appointment.        Helga Ibarra MD  [facility]   Azalea is a 66 year old who presents for the following health issues  accompanied by her daughter.Malini,     History of Present Illness       Mental Health Follow-up:  Patient presents to follow-up on Depression & Anxiety.Patient's depression since last visit has been:  Medium  The patient is not having other symptoms associated with depression.  Patient's anxiety since last visit has been:  Medium  The patient is having other symptoms associated with anxiety.  Any significant life events: No  Patient is feeling anxious or having panic attacks.  Patient has no concerns about alcohol or drug use.    She eats 2-3 servings of fruits and vegetables daily.She exercises with enough effort to increase her heart rate 10 to 19 minutes per day.    She is taking medications regularly.    Today's PHQ-9         PHQ-9 Total Score: 7    PHQ-9 Q9 Thoughts of better off dead/self-harm past 2 weeks :   Not at all    How difficult have these problems made it for you to do your work, take care of things at home, or get along with other people: Somewhat difficult  Today's TIFFANY-7 Score: 6      Seeing her psychiatrist again on 6/14/2022.   Sleeping ok, but foot cramps during the night.     Afternoons are better mood-wise. For 30 min feels like she just wants to sleep.   Would like to feel better right now. Has a hard time being patient in her recovery from her severe depression.  Awakens in the morning feeling rested.  Tires out pretty easily.   Lies down  to try to clear her ears out and closes her eyes for a while, but doesn't sleep.  Does that 3x/day.to clear out her ears.    Seeing her therapist every 2 weeks as well.  Has been twice.  Will give it one more try.  Continue with that.  Tell your therapist what you'd like to talk about, if you can   Is still on the wait list for the [facility] clinic.        Left knee pain - mild Baker's cyst - slightly tight hamstring tendons.           Social History     Tobacco Use     Smoking status: Never Smoker     Smokeless tobacco: Never Used   Substance Use Topics     Alcohol use: Yes     Alcohol/week: 0.0 - 1.0 standard drinks     Comment: 2 per month     Drug use: No     Comment: no herbal meds either     PHQ 5/9/2022 5/23/2022 6/8/2022   PHQ-9 Total Score 12 9 7   Q9: Thoughts of better off dead/self-harm past 2 weeks Not at all Not at all Not at all   Some encounter information is confidential and restricted. Go to Review Flowsheets activity to see all data.     TIFFANY-7 SCORE 5/12/2022 5/23/2022 6/8/2022   Total Score - - -   Total Score 6 (mild anxiety) 6 (mild anxiety) 6 (mild anxiety)   Total Score 6 6 6     Last PHQ-9 6/8/2022   1.  Little interest or pleasure in doing things 1   2.  Feeling down, depressed, or hopeless 1   3.  Trouble falling or staying asleep, or sleeping too much 1   4.  Feeling tired or having little energy 1   5.  Poor appetite or overeating 1   6.  Feeling bad about yourself 0   7.  Trouble concentrating 1   8.  Moving slowly or restless 1   Q9: Thoughts of better off dead/self-harm past 2 weeks 0   PHQ-9 Total Score 7   Difficulty at  "work, home, or with people -   Some encounter information is confidential and restricted. Go to Review Flowsheets activity to see all data.     TIFFANY-7  6/8/2022   1. Feeling nervous, anxious, or on edge 1   2. Not being able to stop or control worrying 1   3. Worrying too much about different things 1   4. Trouble relaxing 1   5. Being so restless that it is hard to sit still 2   6. Becoming easily annoyed or irritable 0   7. Feeling afraid, as if something awful might happen 0   TIFFANY-7 Total Score 6   If you checked any problems, how difficult have they made it for you to do your work, take care of things at home, or get along with other people? -       Suicide Assessment Five-step Evaluation and Treatment (SAFE-T)        Review of Systems   Constitutional, HEENT, cardiovascular, pulmonary, GI, , musculoskeletal, neuro, skin, endocrine and psych systems are negative, except as otherwise noted.      Objective    /80   Pulse 102   Temp 97.9  F (36.6  C)   Resp 16   Ht 1.721 m (5' 7.75\")   Wt 59.4 kg (131 lb)   LMP 06/28/2001   SpO2 99%   BMI 20.07 kg/m    Body mass index is 20.07 kg/m .  Physical Exam :   GENERAL: healthy, alert and no distress  EYES: Eyes grossly normal to inspection, PERRL and conjunctivae and sclerae normal  HENT: ear canals and TM's normal, nose and mouth without ulcers or lesions  NECK: no adenopathy, no asymmetry, masses, or scars and thyroid normal to palpation  RESP: lungs clear to auscultation - no rales, rhonchi or wheezes  BREAST: normal without masses, tenderness or nipple discharge and no palpable axillary masses or adenopathy  CV: regular rate and rhythm, normal S1 S2, no S3 or S4, no murmur, click or rub, no peripheral edema and peripheral pulses strong  ABDOMEN: soft, nontender, no hepatosplenomegaly, no masses and bowel sounds normal  MS: no gross musculoskeletal defects noted, no edema  Left knee:there is a mild Baker's cyst - slightly tight hamstring tendons otherwise " Knees reveal full range of motion, no tenderness, masses, effusion or ligamentous instability.   SKIN: no suspicious lesions or rashes  NEURO: Normal strength and tone, mentation intact and speech normal  PSYCH: Alert and oriented. No acute distress. Appears well-groomed and casually dressed. Makeup is done naturally. Affect is still slightly blunted and depressed, but a bit more animated than previous.  In good humor and laughs appropriately. Not particularly anxious. No evidence of psychosis.   Baker's cyst left popliteal fossa and tight hamstrings.     Office Visit on 04/25/2022   Component Date Value Ref Range Status     Sodium 04/25/2022 137  133 - 144 mmol/L Final     Potassium 04/25/2022 4.4  3.4 - 5.3 mmol/L Final     Chloride 04/25/2022 105  94 - 109 mmol/L Final     Carbon Dioxide (CO2) 04/25/2022 26  20 - 32 mmol/L Final     Anion Gap 04/25/2022 6  3 - 14 mmol/L Final     Urea Nitrogen 04/25/2022 23  7 - 30 mg/dL Final     Creatinine 04/25/2022 0.78  0.52 - 1.04 mg/dL Final     Calcium 04/25/2022 9.3  8.5 - 10.1 mg/dL Final     Glucose 04/25/2022 104 (A) 70 - 99 mg/dL Final     Alkaline Phosphatase 04/25/2022 64  40 - 150 U/L Final     AST 04/25/2022 11  0 - 45 U/L Final     ALT 04/25/2022 21  0 - 50 U/L Final     Protein Total 04/25/2022 7.1  6.8 - 8.8 g/dL Final     Albumin 04/25/2022 4.0  3.4 - 5.0 g/dL Final     Bilirubin Total 04/25/2022 0.4  0.2 - 1.3 mg/dL Final     GFR Estimate 04/25/2022 83  >60 mL/min/1.73m2 Final    Effective December 21, 2021 eGFRcr in adults is calculated using the 2021 CKD-EPI creatinine equation which includes age and gender (Mercedes et al., NEJM, DOI: 10.1056/WRKKyi1626682)     TSH 04/25/2022 1.30  0.40 - 4.00 mU/L Final     25 OH Vitamin D2 04/25/2022 <5  ug/L Final     25 OH Vitamin D3 04/25/2022 33  ug/L Final     25 OH Vit D Total 04/25/2022 <38  20 - 75 ug/L Final    Season, race, dietary intake, and treatment affect the concentration of 25-hydroxy-Vitamin D. Values may  decrease during winter months and increase during summer months. Values 20-29 ug/L may indicate Vitamin D insufficiency and values <20 ug/L may indicate Vitamin D deficiency.     WBC Count 04/25/2022 8.1  4.0 - 11.0 10e3/uL Final     RBC Count 04/25/2022 4.58  3.80 - 5.20 10e6/uL Final     Hemoglobin 04/25/2022 13.6  11.7 - 15.7 g/dL Final     Hematocrit 04/25/2022 41.1  35.0 - 47.0 % Final     MCV 04/25/2022 90  78 - 100 fL Final     MCH 04/25/2022 29.7  26.5 - 33.0 pg Final     MCHC 04/25/2022 33.1  31.5 - 36.5 g/dL Final     RDW 04/25/2022 12.9  10.0 - 15.0 % Final     Platelet Count 04/25/2022 252  150 - 450 10e3/uL Final     % Neutrophils 04/25/2022 72  % Final     % Lymphocytes 04/25/2022 17  % Final     % Monocytes 04/25/2022 8  % Final     % Eosinophils 04/25/2022 2  % Final     % Basophils 04/25/2022 1  % Final     % Immature Granulocytes 04/25/2022 0  % Final     Absolute Neutrophils 04/25/2022 5.9  1.6 - 8.3 10e3/uL Final     Absolute Lymphocytes 04/25/2022 1.4  0.8 - 5.3 10e3/uL Final     Absolute Monocytes 04/25/2022 0.6  0.0 - 1.3 10e3/uL Final     Absolute Eosinophils 04/25/2022 0.2  0.0 - 0.7 10e3/uL Final     Absolute Basophils 04/25/2022 0.1  0.0 - 0.2 10e3/uL Final     Absolute Immature Granulocytes 04/25/2022 0.0  <=0.4 10e3/uL Final

## 2022-06-14 ENCOUNTER — VIRTUAL VISIT (OUTPATIENT)
Dept: PSYCHOLOGY | Facility: CLINIC | Age: 67
End: 2022-06-14
Payer: COMMERCIAL

## 2022-06-14 ENCOUNTER — VIRTUAL VISIT (OUTPATIENT)
Dept: PSYCHIATRY | Facility: CLINIC | Age: 67
End: 2022-06-14
Payer: COMMERCIAL

## 2022-06-14 DIAGNOSIS — G47.00 INSOMNIA, UNSPECIFIED TYPE: ICD-10-CM

## 2022-06-14 DIAGNOSIS — F39 MOOD DISORDER (H): Primary | ICD-10-CM

## 2022-06-14 DIAGNOSIS — F41.1 GAD (GENERALIZED ANXIETY DISORDER): Primary | ICD-10-CM

## 2022-06-14 DIAGNOSIS — Z87.820 HISTORY OF TRAUMATIC BRAIN INJURY: ICD-10-CM

## 2022-06-14 DIAGNOSIS — G21.19 DRUG-INDUCED PARKINSONISM (H): ICD-10-CM

## 2022-06-14 DIAGNOSIS — F41.1 GAD (GENERALIZED ANXIETY DISORDER): ICD-10-CM

## 2022-06-14 PROCEDURE — 99214 OFFICE O/P EST MOD 30 MIN: CPT | Mod: 25 | Performed by: PSYCHIATRY & NEUROLOGY

## 2022-06-14 PROCEDURE — 90832 PSYTX W PT 30 MINUTES: CPT | Mod: 95 | Performed by: PSYCHOLOGIST

## 2022-06-14 NOTE — PROGRESS NOTES
[facility] Psychiatry Service  June 14, 2022      Behavioral Health Clinician Progress Note    Patient Name: Jolynn Haji           Service Type:  Individual      Service Location:   [facility]     Session Start Time: 10:30am  Session End Time: 10:50am      Session Length: 16 - 37      Attendees: Patient     Service Modality:  Video Visit:      Provider verified identity through the following two step process.  Patient provided:  Patient is known previously to provider    Telemedicine Visit: The patient's condition can be safely assessed and treated via synchronous audio and visual telemedicine encounter.      Reason for Telemedicine Visit: Services only offered telehealth    Originating Site (Patient Location): Patient's home    Distant Site (Provider Location): Provider Remote Setting- Home Office    Consent:  The patient/guardian has verbally consented to: the potential risks and benefits of telemedicine (video visit) versus in person care; bill my insurance or make self-payment for services provided; and responsibility for payment of non-covered services.     Patient would like the video invitation sent by:  My Chart    Mode of Communication:  Video Conference via [facility]    As the provider I attest to compliance with applicable laws and regulations related to telemedicine.    Visit Activities (Refresh list every visit): [facility] Only    Diagnostic Assessment Date: 05/12/2022  Treatment Plan Review Date: 08/24/2022  See Flowsheets for today's PHQ-9 and TIFFANY-7 results  Previous PHQ-9:   PHQ-9 SCORE 5/9/2022 5/23/2022 6/8/2022   PHQ-9 Total Score - - -   PHQ-9 Total Score MyChart - 9 (Mild depression) 7 (Mild depression)   PHQ-9 Total Score 12 9 7   Some encounter information is confidential and restricted. Go to Review Flowsheets activity to see all data.     Previous TIFFANY-7:   TIFFANY-7 SCORE 5/12/2022 5/23/2022 6/8/2022   Total Score - - -   Total Score 6 (mild anxiety) 6 (mild anxiety) 6 (mild  "anxiety)   Total Score 6 6 6       KARINA LEVEL:  KARINA Score (Last Two) 8/27/2010 2/22/2011   KARINA Raw Score 51 49   Activation Score 91.6 82.8   KARINA Level 4 4       DATA  Extended Session (60+ minutes): No  Interactive Complexity: No  Crisis: No  [facility] Patient: No    Treatment Objective(s) Addressed in This Session:  Target Behavior(s): anxiety    Anxiety: will develop more effective coping skills to manage anxiety symptoms    Current Stressors / Issues:  Reported that she has not been able to notice a difference by lowering the Abilify. Reported that she is still feeling depressed and does not feel it has gotten any better/worse than when she first started with [facility]. She is waking up with cramps. She would like to know how long it will take for her to feel better and would like to know how much she needs to push herself to do things. She spoke about going to lunch with some friends and had some anticipation anxiety. She worried that she would not be able to stay focused on the conversation and participate. The anxiety did go away when she got there but she felt tired and had to leave about an hour later which is unusual as they usually have lunch for about 3 hours.       05/24/2022:  Reported that she can focus \"a little longer than before\" and she feels she is \"staying in the present\" rather than worrying about the next thing to come. Her sleep is a little better. Instead of waking up at 1:30am, she sleeps until about 4:30am, uses the bathroom, and she can go back to sleep. She still feels tired during the day and estimated that it is about the same as before. She said that she is able to complete tasks. \"I won't win any races but I can get them done now.\" She noted that she had no concerns about her current medications.      05/12/2022 (DA):  \"I'm hoping we can figure out what triggered this setback.\" She was diagnosed with anxiety and depression after a concussion a concussion in 04/2017. She had been sick at the " "time, got up one night and fell. She is uncertain where on her head she landed. She was on Lexapro, mirtazapine, and seroquel. She thought that it was \"fixed\", stopped taking the mirtazapine and seroquel but stayed on the lexapro. In January, started feeling more anxiety. PCP added Abilify which was helpful. The Lexapro was increased which she is not sure if it has been helpful (been on it for 4 weeks). She said, \"I feel like I'm in [place] land. I have a hard time thinking things through.\" Her anxiety and sleep is better but bothered by the slowed thinking.       Progress on Treatment Objective(s) / Homework:  Minimal progress - ACTION (Actively working towards change); Intervened by reinforcing change plan / affirming steps taken    Also provided psychoeducation about behavioral health condition, symptoms, and treatment options    Care Plan review completed: Yes    Medication Review:  Changes to psychiatric medications, see updated Medication List in EPIC.     Medication Compliance:  Yes    Changes in Health Issues:   None reported    Chemical Use Review:   Substance Use: Chemical use reviewed, no active concerns identified      Tobacco Use: No current tobacco use.      Assessment: Current Emotional / Mental Status (status of significant symptoms):  Risk status (Self / Other harm or suicidal ideation)  Patient denies a history of suicidal ideation, suicide attempts, self-injurious behavior, homicidal ideation, homicidal behavior and and other safety concerns  Patient denies current fears or concerns for personal safety.  Patient denies current or recent suicidal ideation or behaviors.  Patient denies current or recent homicidal ideation or behaviors.  Patient denies current or recent self injurious behavior or ideation.  Patient denies other safety concerns.  A safety and risk management plan has not been developed at this time, however patient was encouraged to call [facility] / 911 should there be a change in " any of these risk factors.    Appearance:   Appropriate   Eye Contact:   Good   Psychomotor Behavior: Normal   Attitude:   Cooperative   Orientation:   All  Speech   Rate / Production: Normal    Volume:  Normal   Mood:    Anxious  Normal  Affect:    Restricted   Thought Content:  Clear   Thought Form:  Coherent  Logical   Insight:    Fair     Diagnoses:  1. TIFFANY (generalized anxiety disorder)    2. Insomnia, unspecified type        Collateral Reports Completed:  Communicated with: Dr. Pompa    Plan: (Homework, other):  Patient was given information about behavioral services and encouraged to schedule a follow up appointment with the clinic [facility] in conjunction with next [facility] appointment.  She was also given information about mental health symptoms and treatment options .  CD Recommendations: No indications of CD issues.  Eran Landon PsyD, LP      ______________________________________________________________________    [facility] Psychiatry Service Treatment Plan    Patient's Name: Jolynn Haji  YOB: 1955    Date of Creation: May 24, 2022  Date Treatment Plan Last Reviewed/Revised: May 24, 2022    DSM5 Diagnoses: 300.02 (F41.1) Generalized Anxiety Disorder or 780.52 (G47.00) Insomnia Disorder   With non-sleep disorder mental comorbidity  Persistent    Psychosocial / Contextual Factors: family  PROMIS (reviewed every 90 days):   PROMIS 10-Global Health (only subscores and total score):   PROMIS-10 Scores Only 5/12/2022 5/12/2022   Global Mental Health Score 9 9   Global Physical Health Score 17 17   PROMIS TOTAL - SUBSCORES 26 26       Referral / Collaboration:  Referral to another professional/service is not indicated at this time..    Anticipated number of session for this episode of care: 5-6  Anticipation frequency of session: As determined by Dr. Pompa  Anticipated Duration of each session: 16-37 minutes  Treatment plan will be reviewed in 90 days or when goals have been  changed.       MeasurableTreatment Goal(s) related to diagnosis / functional impairment(s)  Goal 1: Patient will work with providers to manage symptoms    I will know I've met my goal when I can finish my tasks.      Objective #A (Patient Action)  Patient will attend all appointments, take medication as prescribed.  Status: New - Date: 05/24/2022     Intervention(s)  [facility] will Monitor and assist in overcoming barriers to treatment adherence    Objective #B  Patient will consider all recommendations offered.  Status: New - Date: 05/24/2022      Intervention(s)  [facility] will educate patient on treatment options, clarify concerns, work with pt to overcome any resistance to compliance.      Patient has reviewed and agreed to the above plan.      Anthony Landon PsyD  May 24, 2022

## 2022-06-14 NOTE — Clinical Note
Please call this patient to get them scheduled for a follow-up visit in 4-6 weeks. Please schedule with me and the [facility], Dr. Landon. Thanks!

## 2022-06-14 NOTE — Clinical Note
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  Hopefully her symptoms will resolve.  Could take weeks to months.  If they do not fully resolve she will need a neurology referral for ongoing management and treatment.  I am going to see her back in 4-6 weeks.  Let me know if you have questions or concerns.

## 2022-06-14 NOTE — PATIENT INSTRUCTIONS
Treatment Plan:  Continue Lexapro 15 mg daily for anxiety, mood.  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  We will monitor for symptom improvement.  If symptoms do not continue to improve/do not improve fully, we will place a neurology referral for ongoing monitoring/treatment.  Could consider low-dose methylphenidate augmentation of Lexapro if clinically indicated at a future visit.  If you have trouble sleeping, please message me and we could consider restarting mirtazapine at bedtime.  Would like to avoid quetiapine to prevent recurrence of drug-induced parkinsonism.  Continue all other cares per primary care provider.   Continue all other medications as reviewed per electronic medical record today.   Safety plan reviewed. To the Emergency Department as needed or call after hours crisis line at [phone] or [phone]. [place] Crisis Text Line. Text [place] to 080035 or Suicide LifeLine Chat: suicidepreventionlifeline.org/chat  Consider individual psychotherapy for additional support and ongoing development of nonpharmacologic coping skills and strategies.  Schedule an appointment with me in 4-6 weeks or sooner as needed. Call [facility] at [phone] to schedule.  Follow up with primary care provider as planned or for acute medical concerns.  Call the psychiatric nurse line with medication questions or concerns at [phone].  [employer] may be used to communicate with your provider, but this is not intended to be used for emergencies.

## 2022-06-14 NOTE — PROGRESS NOTES
"Jolynn Haji is a 66 year old year old who is being evaluated via a billable video visit.      How would you like to obtain your AVS? MyChart  If you are dropped from the video visit, the video invite should be resent to: Text to cell phone: see Epic  Will anyone else be joining your video visit? No     Telemedicine Visit: The patient's condition can be safely assessed and treated via synchronous audio and visual telemedicine encounter.      Reason for Telemedicine Visit: Covid-19 Pandemic    Originating Site (Patient Location): Patient's home     Distant Site (Provider Location): Provider Remote Setting    Mode of Communication:  Video Conference via iKnowl    As the provider I attest to compliance with applicable laws and regulations related to telemedicine.        Outpatient Psychiatric Progress Note    Name: Jolynn Haji   : 1955                    Primary Care Provider: Helga Ibarra MD   Therapist: None    PHQ-9 scores:  PHQ-9 SCORE 2022   PHQ-9 Total Score - - -   PHQ-9 Total Score MyChart - 9 (Mild depression) 7 (Mild depression)   PHQ-9 Total Score 12 9 7   Some encounter information is confidential and restricted. Go to Review Flowsheets activity to see all data.       TIFFANY-7 scores:  TIFFANY-7 SCORE 2022   Total Score - - -   Total Score 6 (mild anxiety) 6 (mild anxiety) 6 (mild anxiety)   Total Score 6 6 6       Patient Identification:  Patient is a 66 year old,   White Choose not to answer female  who presents for return visit with me.  Patient is currently retired. Patient attended the phone/video session with her daughter Malini rucker. Patient prefers to be called: \" Azalea\".    Interim History:  I last saw Jolynn Haji for outpatient psychiatry return visit on 2022. During that appointment, we:      Continue Abilify/aripiprazole 5 mg daily to augment Lexapro.    Decrease Lexapro/excitalopram to 15 mg daily for anxiety, mood and " to see if some of what you have been experiencing as possible side effects improves.      Consider individual psychotherapy for additional support and ongoing development of nonpharmacologic coping skills and strategies.    6/14: Patient overall feeling relatively the same since last visit.  However, after speaking with her and her daughter, it does sound like her speech has improved and some of her other symptoms that may have been related to Abilify have improved (stiffness, gait, etc).  I was able to get a little bit more history from patient's daughter today.  Before the concussion in 2017 the patient was incredibly social, running errands all the time, baking, seeing the grandkids, always on the phone, helping take care of everyone.  After the fall the patient did not want to get out of bed, did not want to see or talk to anyone, preferred to be locked in her room.  She stopped sleeping well at night.  When I asked about the patient's delayed/hesitant speech, they reported that has only been an issue over the past few-several months.  They also did notice that her energy has been decreased and she has seemed to move much more slowly and seemed a lot stiffer than usual.  Her face has also had much fewer expressions than usual.  A lot of the symptoms have seemed to improve over the last month since we decreased Abilify.  Mood and anxiety symptoms have seemed to remain relatively unchanged.  No acute safety concerns or SI.  No problematic drug or alcohol use.    Per [facility], Dr. Eran Landon, during today's team-based visit:  Reported that she has not been able to notice a difference by lowering the Abilify. Reported that she is still feeling depressed and does not feel it has gotten any better/worse than when she first started with [facility]. She is waking up with cramps. She would like to know how long it will take for her to feel better and would like to know how much she needs to push herself to do things. She spoke about  "going to lunch with some friends and had some anticipation anxiety. She worried that she would not be able to stay focused on the conversation and participate. The anxiety did go away when she got there but she felt tired and had to leave about an hour later which is unusual as they usually have lunch for about 3 hours.      Past Psychiatric Med Trials:  Psych Meds at Intake:  lexapro 20 mg daily  abilify 5 mg daily      Past Psych Meds:  Amitriptyline  Quetiapine  Mirtazapine - stopped \"because I was sleeping through the night\"    Psychiatric ROS:  Jolynn Haji reports mood has been: About the same since last visit  Anxiety has been: About the same since last visit  Sleep has been: About the same since last visit  Seema sxs: None  Psychosis sxs: N/A  ADHD/ADD sxs: N/A  PTSD sxs: N/A  PHQ9 and GAD7 scores were reviewed today if completed.   Medication side effects: Suspected drug-induced parkinsonism from Abilify  Current stressors include: Symptoms and See HPI above  Coping mechanisms and supports include: Family, Hobbies and Friends    Current medications include:   Current Outpatient Medications   Medication Sig     ARIPiprazole (ABILIFY) 2 MG tablet Take 1 tablet (2 mg) by mouth daily     clindamycin (CLINDAGEL) 1 % external gel Apply topically daily     doxylamine (UNISOM) 25 MG TABS tablet Take 25 mg by mouth At Bedtime Takes nightly     escitalopram (LEXAPRO) 10 MG tablet Take one tab (10 mg) by mouth WITH 5 mg tab for total dose of 15 mg daily.     escitalopram (LEXAPRO) 5 MG tablet Take one tab (5 mg) by mouth WITH 10 mg tab for total dose of 15 mg daily.     lactobacillus rhamnosus, GG, (CULTURELL) capsule Take 1 capsule by mouth 2 times daily     lisinopril (ZESTRIL) 2.5 MG tablet Take 1 tablet (2.5 mg) by mouth daily     No current facility-administered medications for this visit.       Past Medical/Surgical History:  Past Medical History:   Diagnosis Date     Abnormal glandular Papanicolaou smear of " cervix- ASCUS in 2000 12/5/2003    ASC H- 2000     Basal cell carcinoma of leg, right 05/2016    Dr Rogers     Colon polyps 05/2018    tubular adenoam - due 5 yrs - 2 mm     Concussion without loss of consciousness 4/12/2017     Hidradenitis     left  groin     HSIL (high grade squamous intraepithelial lesion) on Pap smear of cervix 7/5/2000    Normal paps from 2001 to 2016/cc     Hypertension goal BP (blood pressure) < 140/90      OA (osteoarthritis) of knee     moderate     Postmenopausal since 7/2008      Shingles 7/23/2010    left      Squamous cell carcinoma of skin, unspecified      Synovial cyst of popliteal space       has a past medical history of Abnormal glandular Papanicolaou smear of cervix- ASCUS in 2000 (12/5/2003), Basal cell carcinoma of leg, right (05/2016), Colon polyps (05/2018), Concussion without loss of consciousness (4/12/2017), Hidradenitis, HSIL (high grade squamous intraepithelial lesion) on Pap smear of cervix (7/5/2000), Hypertension goal BP (blood pressure) < 140/90, OA (osteoarthritis) of knee, Postmenopausal (since 7/2008 ), Shingles (7/23/2010), Squamous cell carcinoma of skin, unspecified, and Synovial cyst of popliteal space.    She has no past medical history of Malignant melanoma (H) or Squamous cell carcinoma.    Social History:  Reviewed. No changes to social history except as noted above in HPI.    Vital Signs:   None. This is phone/video visit.     Labs:  Most recent laboratory results reviewed and the pertinent results include:   TSH   Date Value Ref Range Status   04/25/2022 1.30 0.40 - 4.00 mU/L Final   01/13/2021 2.07 0.40 - 4.00 mU/L Final     Lab Results   Component Value Date    WBC 8.1 04/25/2022    WBC 6.4 01/13/2021     Lab Results   Component Value Date    RBC 4.58 04/25/2022    RBC 4.54 01/13/2021     Lab Results   Component Value Date    HGB 13.6 04/25/2022    HGB 13.3 01/13/2021     Lab Results   Component Value Date    HCT 41.1 04/25/2022    HCT 41.7  01/13/2021     No components found for: MCT  Lab Results   Component Value Date    MCV 90 04/25/2022    MCV 92 01/13/2021     Lab Results   Component Value Date    MCH 29.7 04/25/2022    MCH 29.3 01/13/2021     Lab Results   Component Value Date    MCHC 33.1 04/25/2022    MCHC 31.9 01/13/2021     Lab Results   Component Value Date    RDW 12.9 04/25/2022    RDW 13.1 01/13/2021     Lab Results   Component Value Date     04/25/2022     01/13/2021     Last Comprehensive Metabolic Panel:  Sodium   Date Value Ref Range Status   04/25/2022 137 133 - 144 mmol/L Final   01/13/2021 140 133 - 144 mmol/L Final     Potassium   Date Value Ref Range Status   04/25/2022 4.4 3.4 - 5.3 mmol/L Final   01/13/2021 4.1 3.4 - 5.3 mmol/L Final     Chloride   Date Value Ref Range Status   04/25/2022 105 94 - 109 mmol/L Final   01/13/2021 108 94 - 109 mmol/L Final     Carbon Dioxide   Date Value Ref Range Status   01/13/2021 28 20 - 32 mmol/L Final     Carbon Dioxide (CO2)   Date Value Ref Range Status   04/25/2022 26 20 - 32 mmol/L Final     Anion Gap   Date Value Ref Range Status   04/25/2022 6 3 - 14 mmol/L Final   01/13/2021 4 3 - 14 mmol/L Final     Glucose   Date Value Ref Range Status   04/25/2022 104 (H) 70 - 99 mg/dL Final   01/13/2021 91 70 - 99 mg/dL Final     Comment:     Fasting specimen     Urea Nitrogen   Date Value Ref Range Status   04/25/2022 23 7 - 30 mg/dL Final   01/13/2021 18 7 - 30 mg/dL Final     Creatinine   Date Value Ref Range Status   04/25/2022 0.78 0.52 - 1.04 mg/dL Final   01/13/2021 0.68 0.52 - 1.04 mg/dL Final     GFR Estimate   Date Value Ref Range Status   04/25/2022 83 >60 mL/min/1.73m2 Final     Comment:     Effective December 21, 2021 eGFRcr in adults is calculated using the 2021 CKD-EPI creatinine equation which includes age and gender (Mercedes et al., NEJM, DOI: 10.1056/FOTIaj5100170)   01/13/2021 >90 >60 mL/min/[1.73_m2] Final     Comment:     Non  GFR Calc  Starting  12/18/2018, serum creatinine based estimated GFR (eGFR) will be   calculated using the Chronic Kidney Disease Epidemiology Collaboration   (CKD-EPI) equation.       Calcium   Date Value Ref Range Status   04/25/2022 9.3 8.5 - 10.1 mg/dL Final   01/13/2021 8.8 8.5 - 10.1 mg/dL Final     Bilirubin Total   Date Value Ref Range Status   04/25/2022 0.4 0.2 - 1.3 mg/dL Final   01/13/2021 0.8 0.2 - 1.3 mg/dL Final     Alkaline Phosphatase   Date Value Ref Range Status   04/25/2022 64 40 - 150 U/L Final   01/13/2021 72 40 - 150 U/L Final     ALT   Date Value Ref Range Status   04/25/2022 21 0 - 50 U/L Final   01/13/2021 17 0 - 50 U/L Final     AST   Date Value Ref Range Status   04/25/2022 11 0 - 45 U/L Final   01/13/2021 13 0 - 45 U/L Final     Review of Systems:  10 systems (general, cardiovascular, respiratory, eyes, ENT, endocrine, GI, , M/S, neurological) were reviewed. Most pertinent finding(s) is/are: Some chronic pains, see HPI above. The remaining systems are all unremarkable.    Mental Status Examination (limited as this is by phone/video):  Appearance: Awake, alert, appears stated age, no acute distress, well-groomed   Attitude:  cooperative, pleasant   Motor: Psychomotor slowing, not formally tested  Oriented to:  person, place, time, and situation  Attention Span and Concentration:  normal  Speech:  A little delayed/hesitant responses perhaps, Normal volume  Language: intact  Mood: Depressed  Affect:  intensity is flat  Associations:  no loose associations  Thought Process:  logical, linear and goal oriented  Thought Content:  no evidence of suicidal ideation or homicidal ideation, no evidence of psychotic thought, no auditory hallucinations present and no visual hallucinations present  Recent and Remote Memory:  Not formally assessed. Possibly some deficits? Could consider neuropsychological testing recommendation.  Fund of Knowledge: appropriate  Insight:  fair  Judgment:  intact, adequate for  safety  Impulse Control:  intact    Suicide Risk Assessment:  Today Jolynn Haji reports no suicidal ideation. Based on all available evidence including the factors cited above, Jolynn Haji does not appear to be at imminent risk for self-harm, does not meet criteria for a 72-hr hold, and therefore remains appropriate for ongoing outpatient level of care.  A thorough assessment of risk factors related to suicide and self-harm have been reviewed and are noted above. The patient convincingly denies suicidality on several occasions. Local community safety resources printed and reviewed for patient to use if needed. There was no deceit detected, and the patient presented in a manner that was believable.     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  >> Colpo: Neg  2001 NIL   2003 NIL pap, Neg HPV  [id number], 2016  NIL pap  7/25/19 NIL pap, Neg HPV    Criteria necessary to stop screening per ASCCP guidelines:  Older than 65 years  Three consecutive negative cytology results or two consecutive negative cotest results within the previous 10 years, with the most recent being performed within the last 5 years.   (Women with hx of MADDIE 2 or 3, or adenocarcinoma in situ should continue screening for full 20 years, even if this goes past age 65).         Hyperlipidemia LDL goal <130 11/30/2018     Priority: Medium     Basal cell carcinoma      Priority: Medium     Colon polyps 05/01/2018     Priority: Medium     Primary localized osteoarthrosis of left lower leg 02/19/2018     Priority: Medium      Other secondary osteoarthritis of left knee 01/29/2018     Priority: Medium     Chronic constipation 09/14/2017     Priority: Medium     Raynaud's phenomenon without gangrene 09/14/2017     Priority: Medium     Insomnia, unspecified type 07/25/2017     Priority: Medium     TIFFANY (generalized anxiety disorder) 05/23/2017     Priority: Medium     Adjustment disorder with mixed anxiety and depressed mood- mild-moderate  at this time 05/11/2017     Priority: Medium     Family history of celiac disease 05/11/2017     Priority: Medium     Basal cell carcinoma of leg, right 05/01/2016     Priority: Medium     Sun-damaged skin 04/21/2016     Priority: Medium     Seasonal allergic rhinitis 04/21/2016     Priority: Medium     Dupuytren's disease of palm - right 4th digit flexor tendon 06/11/2015     Priority: Medium     History of migraine headaches- assoc. with nausea/vomiting - resolved around menopause - were  premenstrual  01/08/2014     Priority: Medium     Rosacea 05/01/2013     Priority: Medium     Numbness of feet- distal feet R>L  02/21/2011     Priority: Medium     NUMBNESS AND TINGLING OF FOOT - bilateral -mild  08/25/2010     Priority: Medium     Osteopenia 01/02/2009     Priority: Medium     Lipidosis 10/24/2006     Priority: Medium     Problem list name updated by automated process. Provider to review       Localized osteoarthritis of hand 11/01/2005     Priority: Medium     Problem list name updated by automated process. Provider to review       Stress incontinence, female - mild 10/23/2012     Priority: Low     Cough 10/23/2012     Priority: Low     Acute bronchitis- recurrent- ? related to mold in school building- pt has since retired and no further bronchitis 10/23/2012     Priority: Low     Postmenopausal      Priority: Low     DERMATOPHYTOSIS OF NAIL- toenails  03/14/2006     Priority: Low     trichophyton rubrum on culture - 1/06        Synovial cyst of popliteal space      Priority: Low     Hidradenitis  12/05/2003     Priority: Low       Psychosocial & Contextual Factors: see HPI above    Assessment:  From Intake, 5/12/2022:  Jolynn Haji is a 66-year-old female with a history including anxiety, depression, insomnia status post concussion in April 2017.  No major mental health struggles prior to concussion in 2017.  Patient was started on Lexapro, quetiapine, mirtazapine to help with her symptoms.  Patient had been feeling much better on that medication combination and once she was sleeping well again quetiapine and mirtazapine were discontinued.  This past January the patient started to have some worsening symptoms and Abilify was added.  Patient has felt like her current medications of Lexapro and Abilify have been helpful but reports starting to feel really slowed and numb the past several weeks.  She also reported a weight loss of nearly 20 pounds over the last 6 weeks versus several months (patient was not very sure). I am wondering if she could be experiencing side effects of her increased Lexapro dose.  She is agreeable to decreasing her dose slightly to 15 mg daily.  Could also consider decreasing Abilify in the case it is not her Lexapro.  She also desires to sleep a little bit better than she has been.  Could consider restarting mirtazapine at bedtime for sleep and to also augment her Lexapro.  Could also be beneficial for patient to undergo cognitive screening to check in and see what her baseline is status post concussion.  No acute safety concerns.  No SI.  No problematic drug or alcohol use.    5/24/2022:  Patient with some slight improvements of possible medication related side effects after decreasing Lexapro.  I am wondering if there might be more robust improvements with decreasing Abilify/aripiprazole.  Possible drug-induced Parkinsonism?  She does seem to have delayed/slowed speech, masked face use.  Her gait and other movements unable to be observed but she does report other struggles that may  be consistent with such a presentation.  We are going to decrease her Abilify down to 2 mg daily and I will see her back in just a few weeks.  We will likely discontinue her Abilify at her next visit.  I will message her primary care provider to get some collateral regarding previous baseline functioning prior to starting Abilify.  History of TBI could be confounding presentation.  Could consider neurology consult.    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  Instructed to stop Abilify today.  We will monitor for improvement of symptoms.  If symptoms do not improve may need to refer to neurology.  If symptoms start to improve we will consider low-dose methylphenidate augmentation of Lexapro next visit to help with low energy, mood, focus and concentration, cognition, s/p TBI if still clinically indicated.  We would discuss risks and benefits with patient and her daughter at that time.  No other acute safety concerns or SI.  No problematic drug or alcohol use.  If patient starts to struggle with sleep would restart mirtazapine at bedtime.    Medication side effects and alternatives were reviewed. Health promotion activities recommended and reviewed today. All questions addressed. Education and counseling completed regarding risks and benefits of medications and psychotherapy options. Recommend therapy for additional support.     Treatment Plan:    Continue Lexapro 15 mg daily for anxiety, mood.    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  We will monitor for symptom improvement.  If symptoms do not continue to improve/do not improve fully, we will place a neurology referral for ongoing monitoring/treatment.    Could consider low-dose methylphenidate augmentation of Lexapro if clinically indicated at a future visit.    If you have trouble sleeping, please message me and we could consider restarting mirtazapine at bedtime.  Would like to avoid  quetiapine to prevent recurrence of drug-induced parkinsonism.    Continue all other cares per primary care provider.     Continue all other medications as reviewed per electronic medical record today.     Safety plan reviewed. To the Emergency Department as needed or call after hours crisis line at [phone] or [phone]. [place] Crisis Text Line. Text [place] to 725792 or Suicide LifeLine Chat: suicidepreventionScanbuyline.org/chat    Consider individual psychotherapy for additional support and ongoing development of nonpharmacologic coping skills and strategies.    Schedule an appointment with me in 4-6 weeks or sooner as needed. Call [facility] at [phone] to schedule.    Follow up with primary care provider as planned or for acute medical concerns.    Call the psychiatric nurse line with medication questions or concerns at [phone].    [employer] may be used to communicate with your provider, but this is not intended to be used for emergencies.    Administrative Billing:   Phone Call/Video Duration: 16 Minutes  Start: 11:00a  Stop: 11:16a    Time spent with patient was 16 minutes and greater than 50% of time or 9 minutes was spent in counseling and coordination of care regarding above diagnoses and treatment plan.    Patient Status:  Patient will continue to be seen for ongoing consultation and stabilization.    Signed:   Judy Pompa DO  [facility]    Disclaimer: This note consists of symbols derived from keyboarding, dictation and/or voice recognition software. As a result, there may be errors in the script that have gone undetected. Please consider this when interpreting information found in this chart.

## 2022-06-20 ENCOUNTER — OFFICE VISIT (OUTPATIENT)
Dept: FAMILY MEDICINE | Facility: CLINIC | Age: 67
End: 2022-06-20
Payer: COMMERCIAL

## 2022-06-20 VITALS
OXYGEN SATURATION: 97 % | SYSTOLIC BLOOD PRESSURE: 118 MMHG | HEART RATE: 54 BPM | DIASTOLIC BLOOD PRESSURE: 76 MMHG | TEMPERATURE: 98.7 F | HEIGHT: 68 IN | WEIGHT: 131 LBS | BODY MASS INDEX: 19.85 KG/M2

## 2022-06-20 DIAGNOSIS — M17.12 PRIMARY OSTEOARTHRITIS OF LEFT KNEE: ICD-10-CM

## 2022-06-20 DIAGNOSIS — M71.22 BAKER'S CYST OF KNEE, LEFT: ICD-10-CM

## 2022-06-20 DIAGNOSIS — F43.23 ADJUSTMENT DISORDER WITH MIXED ANXIETY AND DEPRESSED MOOD: Primary | ICD-10-CM

## 2022-06-20 PROCEDURE — 99214 OFFICE O/P EST MOD 30 MIN: CPT | Performed by: FAMILY MEDICINE

## 2022-06-20 ASSESSMENT — PATIENT HEALTH QUESTIONNAIRE - PHQ9
5. POOR APPETITE OR OVEREATING: SEVERAL DAYS
SUM OF ALL RESPONSES TO PHQ QUESTIONS 1-9: 6

## 2022-06-20 ASSESSMENT — ANXIETY QUESTIONNAIRES
1. FEELING NERVOUS, ANXIOUS, OR ON EDGE: NOT AT ALL
3. WORRYING TOO MUCH ABOUT DIFFERENT THINGS: NOT AT ALL
IF YOU CHECKED OFF ANY PROBLEMS ON THIS QUESTIONNAIRE, HOW DIFFICULT HAVE THESE PROBLEMS MADE IT FOR YOU TO DO YOUR WORK, TAKE CARE OF THINGS AT HOME, OR GET ALONG WITH OTHER PEOPLE: NOT DIFFICULT AT ALL
7. FEELING AFRAID AS IF SOMETHING AWFUL MIGHT HAPPEN: NOT AT ALL
GAD7 TOTAL SCORE: 3
5. BEING SO RESTLESS THAT IT IS HARD TO SIT STILL: SEVERAL DAYS
2. NOT BEING ABLE TO STOP OR CONTROL WORRYING: SEVERAL DAYS
GAD7 TOTAL SCORE: 3
6. BECOMING EASILY ANNOYED OR IRRITABLE: NOT AT ALL

## 2022-06-20 NOTE — PROGRESS NOTES
Assessment & Plan       ICD-10-CM    1. Adjustment disorder with mixed anxiety and depressed mood- mild-moderate  at this time  F43.23 Adult Mental Health  Referral   2. Baker's cyst of knee, left  M71.22 Orthopedic  Referral   3. Primary osteoarthritis of left knee  M17.12 Orthopedic  Referral        Ordering of each unique test  Prescription drug management  37 minutes spent on the date of the encounter doing chart review, history and exam, documentation and further activities per the note     pt would like to stay with same dose of medication and below therapist.  Suggested Alison Montalvo as therapist. -  -Inspiring Change -  Appointments / How to Contact her as she'd like to see a [facility] -based therapist.     I see clients at my private office at [facility], [address] [place] [address]. Sessions are by appointment only, and limited to clients within [place].     Please contact me by telephone at [phone] . Confidential voice mail can be left for me 24 hours a day, and I'll return your call by my next business day.    MEDICATIONS:  Continue current medications without change  Regular exercise  See Patient Instructions    Return in about 3 weeks (around 7/11/2022) for depression/anxiety follow up, w/ Dr. SUE for 40 minute appointment, in person.    Helga Ibarra MD  [facility] :   Azalea is a 66 year old, presenting for the following health issues: Here with daughter Malini Anderson Medication    1. Adjustment disorder with mixed anxiety and depressed mood- mild-moderate  at this time    2. Baker's cyst of knee, left    3. Primary osteoarthritis of left knee        HPI is doing therapy with Anthony Landon.  Would like to see someone new.      Suggested Alison Montalvo as therapist. -  -Inspiring Change -  Appointments / How to Contact her     I see clients at my private  office at [facility], [address] [place] [address]. Sessions are by appointment only, and limited to clients within [place].     Please contact me by telephone at [phone] . Confidential voice mail can be left for me 24 hours a day, and I'll return your call by my next business day.      Depression and Anxiety Follow-Up    How are you doing with your depression since your last visit? Improved -     How are you doing with your anxiety since your last visit?  Stable    Are you having other symptoms that might be associated with depression or anxiety? No    Have you had a significant life event? No     Do you have any concerns with your use of alcohol or other drugs? No    Social History     Tobacco Use     Smoking status: Never Smoker     Smokeless tobacco: Never Used   Substance Use Topics     Alcohol use: Yes     Alcohol/week: 0.0 - 1.0 standard drinks     Comment: 2 per month     Drug use: No     Comment: no herbal meds either     PHQ 5/23/2022 6/8/2022 6/20/2022   PHQ-9 Total Score 9 7 6   Q9: Thoughts of better off dead/self-harm past 2 weeks Not at all Not at all Not at all     TIFFANY-7 SCORE 5/23/2022 6/8/2022 6/20/2022   Total Score - - -   Total Score 6 (mild anxiety) 6 (mild anxiety) -   Total Score 6 6 3     Not sleeping as well since stopping the ability and prior to that .   Is feeling like her thoughts are coming more quickly since   Last PHQ-9 6/20/2022   1.  Little interest or pleasure in doing things 1   2.  Feeling down, depressed, or hopeless 0   3.  Trouble falling or staying asleep, or sleeping too much 2   4.  Feeling tired or having little energy 1   5.  Poor appetite or overeating 1   6.  Feeling bad about yourself 0   7.  Trouble concentrating 1   8.  Moving slowly or restless 0   Q9: Thoughts of better off dead/self-harm past 2 weeks 0   PHQ-9 Total Score 6   Difficulty at work, home, or with people Not difficult at all     TIFFANY-7  6/20/2022   1.  Feeling nervous, anxious, or on edge 0   2. Not being able to stop or control worrying 1   3. Worrying too much about different things 0   4. Trouble relaxing 1   5. Being so restless that it is hard to sit still 1   6. Becoming easily annoyed or irritable 0   7. Feeling afraid, as if something awful might happen 0   TIFFANY-7 Total Score 3   If you checked any problems, how difficult have they made it for you to do your work, take care of things at home, or get along with other people? Not difficult at all       Suicide Assessment Five-step Evaluation and Treatment (SAFE-T)      How many servings of fruits and vegetables do you eat daily?  2-3    On average, how many sweetened beverages do you drink each day (Examples: soda, juice, sweet tea, etc.  Do NOT count diet or artificially sweetened beverages)?   0    How many days per week do you exercise enough to make your heart beat faster? N/a    How many minutes a day do you exercise enough to make your heart beat faster? n/a    How many days per week do you miss taking your medication? 0    Patient Active Problem List   Diagnosis     Essential hypertension with goal blood pressure less than 140/90     Hidradenitis     Localized osteoarthritis of hand     Synovial cyst of popliteal space     DERMATOPHYTOSIS OF NAIL- toenails      Lipidosis     Osteopenia     Postmenopausal     NUMBNESS AND TINGLING OF FOOT - bilateral -mild      Numbness of feet- distal feet R>L      Stress incontinence, female - mild     Cough     Acute bronchitis- recurrent- ? related to mold in school building- pt has since retired and no further bronchitis     Rosacea     History of migraine headaches- assoc. with nausea/vomiting - resolved around menopause - were  premenstrual      Dupuytren's disease of palm - right 4th digit flexor tendon     Sun-damaged skin     Seasonal allergic rhinitis     Basal cell carcinoma of leg, right     Adjustment disorder with mixed anxiety and depressed mood-  "mild-moderate  at this time     Family history of celiac disease     TIFFANY (generalized anxiety disorder)     Insomnia, unspecified type     Chronic constipation     Raynaud's phenomenon without gangrene     Other secondary osteoarthritis of left knee     Primary localized osteoarthrosis of left lower leg     Hyperlipidemia LDL goal <130     Basal cell carcinoma     Colon polyps     Screening for cervical cancer     Adjustment insomnia     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        Current Outpatient Medications   Medication Sig Dispense Refill     clindamycin (CLINDAGEL) 1 % external gel Apply topically daily 60 g 11     doxylamine (UNISOM) 25 MG TABS tablet Take 25 mg by mouth At Bedtime Takes nightly       lactobacillus rhamnosus, GG, (CULTURELL) capsule Take 1 capsule by mouth 2 times daily       lisinopril (ZESTRIL) 2.5 MG tablet Take 1 tablet (2.5 mg) by mouth daily 90 tablet 3     escitalopram (LEXAPRO) 10 MG tablet Take one tab (10 mg) by mouth WITH 5 mg tab for total dose of 15 mg daily. 30 tablet 0     escitalopram (LEXAPRO) 5 MG tablet Take one tab (5 mg) by mouth WITH 10 mg tab for total dose of 15 mg daily. 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      Objective    /76 (BP Location: Left arm, Patient Position: Chair, Cuff Size: Adult Regular)   Pulse 54   Temp 98.7  F (37.1  C) (Tympanic)   Ht 1.721 m (5' 7.75\")   Wt 59.4 kg (131 lb)   LMP 06/28/2001   SpO2 " 97%   Breastfeeding No   BMI 20.07 kg/m    Body mass index is 20.07 kg/m .  Physical Exam   GENERAL: healthy, alert and no distress  EYES: Eyes grossly normal to inspection, PERRL and conjunctivae and sclerae normal  HENT: ear canals and TM's normal, nose and mouth without ulcers or lesions  NECK: no adenopathy, no asymmetry, masses, or scars and thyroid normal to palpation  RESP: lungs clear to auscultation - no rales, rhonchi or wheezes  CV: regular rate and rhythm, normal S1 S2, no S3 or S4, no murmur, click or rub, no peripheral edema and peripheral pulses strong  ABDOMEN: soft, nontender, no hepatosplenomegaly, no masses and bowel sounds normal  MS: no gross musculoskeletal defects noted, no edema  SKIN: no suspicious lesions or rashes  NEURO: Normal strength and tone, mentation intact and speech normal  PSYCH: mentation appears improved and slightly brighter than previous. More fluid speech and easier word finding.     Office Visit on 04/25/2022   Component Date Value Ref Range Status     Sodium 04/25/2022 137  133 - 144 mmol/L Final     Potassium 04/25/2022 4.4  3.4 - 5.3 mmol/L Final     Chloride 04/25/2022 105  94 - 109 mmol/L Final     Carbon Dioxide (CO2) 04/25/2022 26  20 - 32 mmol/L Final     Anion Gap 04/25/2022 6  3 - 14 mmol/L Final     Urea Nitrogen 04/25/2022 23  7 - 30 mg/dL Final     Creatinine 04/25/2022 0.78  0.52 - 1.04 mg/dL Final     Calcium 04/25/2022 9.3  8.5 - 10.1 mg/dL Final     Glucose 04/25/2022 104 (A) 70 - 99 mg/dL Final     Alkaline Phosphatase 04/25/2022 64  40 - 150 U/L Final     AST 04/25/2022 11  0 - 45 U/L Final     ALT 04/25/2022 21  0 - 50 U/L Final     Protein Total 04/25/2022 7.1  6.8 - 8.8 g/dL Final     Albumin 04/25/2022 4.0  3.4 - 5.0 g/dL Final     Bilirubin Total 04/25/2022 0.4  0.2 - 1.3 mg/dL Final     GFR Estimate 04/25/2022 83  >60 mL/min/1.73m2 Final    Effective December 21, 2021 eGFRcr in adults is calculated using the 2021 CKD-EPI creatinine equation which  includes age and gender (Mercedes koroma al., [place], DOI: 10.1056/OHEVay0105671)     TSH 04/25/2022 1.30  0.40 - 4.00 mU/L Final     25 OH Vitamin D2 04/25/2022 <5  ug/L Final     25 OH Vitamin D3 04/25/2022 33  ug/L Final     25 OH Vit D Total 04/25/2022 <38  20 - 75 ug/L Final    Season, race, dietary intake, and treatment affect the concentration of 25-hydroxy-Vitamin D. Values may decrease during winter months and increase during summer months. Values 20-29 ug/L may indicate Vitamin D insufficiency and values <20 ug/L may indicate Vitamin D deficiency.     WBC Count 04/25/2022 8.1  4.0 - 11.0 10e3/uL Final     RBC Count 04/25/2022 4.58  3.80 - 5.20 10e6/uL Final     Hemoglobin 04/25/2022 13.6  11.7 - 15.7 g/dL Final     Hematocrit 04/25/2022 41.1  35.0 - 47.0 % Final     MCV 04/25/2022 90  78 - 100 fL Final     MCH 04/25/2022 29.7  26.5 - 33.0 pg Final     MCHC 04/25/2022 33.1  31.5 - 36.5 g/dL Final     RDW 04/25/2022 12.9  10.0 - 15.0 % Final     Platelet Count 04/25/2022 252  150 - 450 10e3/uL Final     % Neutrophils 04/25/2022 72  % Final     % Lymphocytes 04/25/2022 17  % Final     % Monocytes 04/25/2022 8  % Final     % Eosinophils 04/25/2022 2  % Final     % Basophils 04/25/2022 1  % Final     % Immature Granulocytes 04/25/2022 0  % Final     Absolute Neutrophils 04/25/2022 5.9  1.6 - 8.3 10e3/uL Final     Absolute Lymphocytes 04/25/2022 1.4  0.8 - 5.3 10e3/uL Final     Absolute Monocytes 04/25/2022 0.6  0.0 - 1.3 10e3/uL Final     Absolute Eosinophils 04/25/2022 0.2  0.0 - 0.7 10e3/uL Final     Absolute Basophils 04/25/2022 0.1  0.0 - 0.2 10e3/uL Final     Absolute Immature Granulocytes 04/25/2022 0.0  <=0.4 10e3/uL Final                   .  ..

## 2022-06-20 NOTE — PATIENT INSTRUCTIONS
[facility]  [address] [place] [address]  Office: [phone]   Fax:    [phone]     Pt will call Alison Montalvo to schedule appt or get on waitlist.     Dr. Ibarra will call Dr. Pompa re: change in therapist for Azalea.     For magnesium supplement - try Mylanta liquid (citrus formula) 5ml or 1 teaspoon about 1 hour prior to bedtime for leg cramps.     Do your knee exercises in bed instead of on the floor.     Thank you so much or choosing [facility]  for your Health Care. It was a pleasure seeing you at your visit today! Please contact us with any questions or concerns you may have.                   Helga Ibarra MD                              To reach your [facility] care team after hours call:   [phone]    PLEASE NOTE OUR HOURS HAVE CHANGED secondary to COVID-19 coronavirus pandemic, as we are trying to minimize patient exposure to the virus,  which is now widespread in the [facility].  These hours may change with very little notice.  We apologize for any inconvenience.       Our current clinic hours are:          Monday- Thursday   7:00am - 6:00pm  in person.      Friday  7:00am- 5:00pm                       Saturday and Sunday : Closed to in person and virtual visits        We have telephone and virtual visit times available between    7:00am - 6pm on Monday-Friday as well.                                                Phone:  [phone]      Our pharmacy hours: Monday through Friday 8:00am to 5:00pm                        Saturday - 9:00 am to 12 noon       Sunday : Closed.              Phone:  [phone]              ###  Please note: at this time we are not accepting any walk-in visits. ###      There is also information available at our web site:  [email or web address]    If your provider ordered any lab tests and you do not receive the results within 10 business days, please call the  clinic.    If you need a medication refill please contact your pharmacy.  Please allow 3 business days for your refill to be completed.    Our clinic offers telephone visits and e visits.  Please ask one of your team members to explain more.      Use [employer] (secure email communication and access to your chart) to send your primary care provider a message or make an appointment. Ask someone on your Team how to sign up for [facility].

## 2022-06-23 ENCOUNTER — TELEPHONE (OUTPATIENT)
Dept: PSYCHIATRY | Facility: CLINIC | Age: 67
End: 2022-06-23

## 2022-06-23 DIAGNOSIS — G47.00 INSOMNIA, UNSPECIFIED TYPE: Primary | ICD-10-CM

## 2022-06-23 RX ORDER — MIRTAZAPINE 15 MG/1
7.5-15 TABLET, FILM COATED ORAL AT BEDTIME
Qty: 90 TABLET | Refills: 0 | Status: SHIPPED | OUTPATIENT
Start: 2022-06-23 | End: 2022-07-28 | Stop reason: ALTCHOICE

## 2022-06-23 NOTE — TELEPHONE ENCOUNTER
Reason for call:  Other   Patient called regarding (reason for call): call back and returning call  Additional comments: pt advised she is returning triage's call, please connect with patient    Phone number to reach patient:  [phone]    Best Time:  asap    Can we leave a detailed message on this number?  YES    Travel screening: Not Applicable

## 2022-06-23 NOTE — TELEPHONE ENCOUNTER
Called patient back and she reported the following.    Reports sleeping about 3 hours and needing a sleep medication. Notes she is having trouble falling asleep. The reason she is concern is a few years ago she stopped sleeping for a months. She is on Unisom at this time which was recommended by her pcp. She is taking 25 mg at night.    Patient reported in the past years she was once on mirtazapine about 30 mg at bedtime.

## 2022-06-23 NOTE — TELEPHONE ENCOUNTER
We had discussed possibly adding this Rx at her last appt if she could not sleep. Rx sent to pharmacy. Please let pt know. Thanks!

## 2022-07-01 ENCOUNTER — OFFICE VISIT (OUTPATIENT)
Dept: ORTHOPEDICS | Facility: CLINIC | Age: 67
End: 2022-07-01
Attending: FAMILY MEDICINE
Payer: COMMERCIAL

## 2022-07-01 VITALS
SYSTOLIC BLOOD PRESSURE: 130 MMHG | HEIGHT: 68 IN | DIASTOLIC BLOOD PRESSURE: 74 MMHG | WEIGHT: 131.4 LBS | BODY MASS INDEX: 19.91 KG/M2

## 2022-07-01 DIAGNOSIS — M71.22 BAKER'S CYST OF KNEE, LEFT: ICD-10-CM

## 2022-07-01 DIAGNOSIS — M17.12 PRIMARY OSTEOARTHRITIS OF LEFT KNEE: ICD-10-CM

## 2022-07-01 PROCEDURE — 20610 DRAIN/INJ JOINT/BURSA W/O US: CPT | Mod: LT | Performed by: STUDENT IN AN ORGANIZED HEALTH CARE EDUCATION/TRAINING PROGRAM

## 2022-07-01 PROCEDURE — 99204 OFFICE O/P NEW MOD 45 MIN: CPT | Mod: 25 | Performed by: STUDENT IN AN ORGANIZED HEALTH CARE EDUCATION/TRAINING PROGRAM

## 2022-07-01 RX ORDER — LIDOCAINE HYDROCHLORIDE 10 MG/ML
3 INJECTION, SOLUTION INFILTRATION; PERINEURAL
Status: SHIPPED | OUTPATIENT
Start: 2022-07-01

## 2022-07-01 RX ORDER — LIDOCAINE HYDROCHLORIDE 10 MG/ML
4 INJECTION, SOLUTION INFILTRATION; PERINEURAL
Status: SHIPPED | OUTPATIENT
Start: 2022-07-01

## 2022-07-01 RX ORDER — TRIAMCINOLONE ACETONIDE 40 MG/ML
40 INJECTION, SUSPENSION INTRA-ARTICULAR; INTRAMUSCULAR
Status: DISCONTINUED | OUTPATIENT
Start: 2022-07-01 | End: 2022-12-27

## 2022-07-01 RX ADMIN — LIDOCAINE HYDROCHLORIDE 4 ML: 10 INJECTION, SOLUTION INFILTRATION; PERINEURAL at 11:20

## 2022-07-01 RX ADMIN — LIDOCAINE HYDROCHLORIDE 3 ML: 10 INJECTION, SOLUTION INFILTRATION; PERINEURAL at 11:20

## 2022-07-01 RX ADMIN — TRIAMCINOLONE ACETONIDE 40 MG: 40 INJECTION, SUSPENSION INTRA-ARTICULAR; INTRAMUSCULAR at 11:20

## 2022-07-01 ASSESSMENT — KOOS JR
BENDING TO THE FLOOR TO PICK UP OBJECT: SEVERE
STRAIGHTENING KNEE FULLY: MODERATE
GOING UP OR DOWN STAIRS: MODERATE
KOOS JR SCORING: 52.47
HOW SEVERE IS YOUR KNEE STIFFNESS AFTER FIRST WAKING IN MORNING: MILD
TWISING OR PIVOTING ON KNEE: SEVERE
STANDING UPRIGHT: MILD
RISING FROM SITTING: MODERATE

## 2022-07-01 NOTE — PROGRESS NOTES
[facility]  Orthopaedic Surgery Consultation by Giovany Quiroz M.D.    Jolynn Haji MRN# [id number]   Age: 66 year old YOB: 1955     Requesting physician: Helga Hughes     Background history:  DX:  1. Hyperlipidemia  2. Hypertension  3. Basal cell carcinoma  4. Osteopenia  5. Raynaud's  6. Generalized anxiety disorder    TREATMENTS:  1. None           History of Present Illness:   66 year old female who presents her clinic because of chronic left knee pain.  Is been present for approximately 1 month after walking with her dog.  No clear antecedent traumatic event.  Patient states that she has been struggling with some knee pain for many years but that it is currently much worse.  In the past she has received an intra-articular injection with cortisone which alleviated her pain significantly.  The pain is felt in the back and lateral side of the knee.  The knee appears to swell.  She endorses the presence of some clicking.  No giving way.  Patient reports night pain and some initiation stiffness and soreness.  To mitigate the pain she has tried ice, elevation, over-the-counter analgesics.  She has not recently seen a physical therapist nor has had any recent injections.  Patient denies the presence of significant hip or lower back pain.  No motor or sensory deficits of left lower extremity.    Social:   Occupation: retired  Living situation: lives with   Hobbies / Sports: walking dog    Smoking: No  Alcohol: No  Illicit drug use: No         Physical Exam:     EXAMINATION pertinent findings:   PSYCH: Pleasant, healthy-appearing, alert, oriented x3, cooperative. Normal mood and affect.  VITAL SIGNS: Last menstrual period 06/28/2001, not currently breastfeeding.  Reviewed nursing intake notes.   There is no height or weight on file to calculate BMI.  RESP: non labored breathing   ABD: benign, soft, non-tender, no acute peritoneal findings  SKIN: grossly  normal   LYMPHATIC: grossly normal, no adenopathy, no extremity edema  NEURO: grossly normal , no motor deficits  VASCULAR: satisfactory perfusion of all extremities   MUSCULOSKELETAL:   Alignment: Varus alignment of left lower extremity.  Gait: Antalgic gait over left lower extremity.    The left hip exhibits a full range of motion.  No pain upon rotations.  Lasegue's test is negative.  No tenderness to greater trochanter region.    L knee: -0-0 .  Bouncy but painless endpoint in extension.  Significant pain throughout entire range of motion.  Straight leg raise +. No redness, warmth or skin changes present. Effusion Yes. Ligamentously stable in both ML and AP direction.  +1 laxity in ML direction most likely due to substance loss.  Normal PF tracking without crepitus. Apprehension -. Rabot -. Meniscal provocation tests are sensitive.  There is recognizable tenderness to palpation over the joint line.     Left LE:   Thigh and leg compartments soft and compressible   +Quad/TA/GSC/FHL/EHL   SILT DP/SP/Lenora/Saph/Tib nerve distributions   Palpable dorsalis pedis pulse            Data:   All laboratory data reviewed  All imaging studies reviewed by me personally.    XR knee left 7/20/2022:  My interpretation: Moderate to severe osteoarthritic changes of the left knee most notable in medial compartment with significant joint space narrowing, presence of marginal osteophytes and sclerosis.  Moderate to severe osteoarthritic changes of the patellofemoral joint most notably in the lateral facet.  Lateral compartment mild osteoarthritic changes.          Assessment and Plan:   Assessment:  66-year-old female presenting with chronic left knee pain due to underlying severe osteoarthritic changes.  Nonoperative treatment measures have not been optimized yet.     Plan:  I had a long discussion with the patient regarding ongoing management options.  Reviewed surgical and nonsurgical treatments.  The non-surgical options  include activity modification, pain medication, PT, bracing and injection therapy.  We discussed that given her wear pattern I do not anticipate bracing would provide her significant relief.  As for surgery we discussed the option total knee replacement surgery.  It would be my recommendation to attempt to optimize nonoperative treatment at this point in time.  This would consist of a physical therapy referral for range of motion, strengthening and stretching exercises in setting of underlying osteoarthritis of the left knee as well as an intra-articular injection with a combination of lidocaine and cortisone.  Patient understands and agrees to the treatment plan set forth.  After obtaining informed consent the left knee was injected without complications.  We will follow-up with patient on an as-needed basis.  We discussed that depending on the time interval we could repeat injection or discuss replacement surgery when patient returns.     More information on joint replacements can be found on : [email or web address]    Thank you for your referral.    Giovany Quiroz MD, PhD     Adult Reconstruction  [facility] Department of Orthopaedic Surgery    Large Joint Injection/Arthocentesis: L knee joint    Date/Time: 7/1/2022 11:20 AM  Performed by: Giovany Quiroz MD  Authorized by: Giovany Quiroz MD     Indications:  Pain  Needle Size:  21 G  Guidance: landmark guided    Approach:  Anterolateral  Location:  Knee      Medications:  40 mg triamcinolone 40 MG/ML; 3 mL lidocaine 1 %; 4 mL lidocaine 1 %  Outcome:  Tolerated well, no immediate complications  Procedure discussed: discussed risks, benefits, and alternatives    Consent Given by:  Patient  Timeout: timeout called immediately prior to procedure    Prep: patient was prepped and draped in usual sterile fashion          DATA for DOCUMENTATION:         Past Medical History:      Patient Active Problem List   Diagnosis     Essential hypertension with goal blood pressure less than 140/90     Hidradenitis     Localized osteoarthritis of hand     Synovial cyst of popliteal space     DERMATOPHYTOSIS OF NAIL- toenails      Lipidosis     Osteopenia     Postmenopausal     NUMBNESS AND TINGLING OF FOOT - bilateral -mild      Numbness of feet- distal feet R>L      Stress incontinence, female - mild     Cough     Acute bronchitis- recurrent- ? related to mold in school building- pt has since retired and no further bronchitis     Rosacea     History of migraine headaches- assoc. with nausea/vomiting - resolved around menopause - were  premenstrual      Dupuytren's disease of palm - right 4th digit flexor tendon     Sun-damaged skin     Seasonal allergic rhinitis     Basal cell carcinoma of leg, right     Adjustment disorder with mixed anxiety and depressed mood- mild-moderate  at this time     Family history of celiac disease     TIFFANY (generalized anxiety disorder)     Insomnia, unspecified type     Chronic constipation     Raynaud's phenomenon without gangrene     Other secondary osteoarthritis of left knee     Primary localized osteoarthrosis of left lower leg     Hyperlipidemia LDL goal <130     Basal cell carcinoma     Colon polyps     Screening for cervical cancer     Adjustment insomnia     Past Medical History:   Diagnosis Date     Abnormal glandular Papanicolaou smear of cervix- ASCUS in 2000 12/5/2003    ASC H- 2000     Basal cell carcinoma of leg, right 05/2016    Dr Rogers     Colon polyps 05/2018    tubular adenoam - due 5 yrs - 2 mm     Concussion without loss of consciousness 4/12/2017     Hidradenitis     left  groin     HSIL (high grade squamous intraepithelial lesion) on Pap smear of cervix 7/5/2000    Normal paps from 2001 to 2016/cc     Hypertension goal BP (blood pressure) < 140/90      OA (osteoarthritis) of knee     moderate     Postmenopausal since 7/2008      Shingles  7/23/2010    left      Squamous cell carcinoma of skin, unspecified      Synovial cyst of popliteal space        Also see scanned health assessment forms.       Past Surgical History:     Past Surgical History:   Procedure Laterality Date     COLONOSCOPY  05/2018    polyps, tubular - 2 mm - due 5 yrs     ZZC NONSPECIFIC PROCEDURE  2000    Colposcopy            Social History:     Social History     Socioeconomic History     Marital status:      Spouse name: Zach     Number of children: 2     Years of education: 16     Highest education level: Not on file   Occupational History     Occupation: teacher first grade -retired at age 60     Comment: [employer]     Employer: [employer]   Tobacco Use     Smoking status: Never Smoker     Smokeless tobacco: Never Used   Substance and Sexual Activity     Alcohol use: Yes     Alcohol/week: 0.0 - 1.0 standard drinks     Comment: 2 per month     Drug use: No     Comment: no herbal meds either     Sexual activity: Yes     Partners: Male     Birth control/protection: Surgical     Comment: -was on DepoProvera since 2000-2/2008 -  had vasectomy   Other Topics Concern      Service No     Blood Transfusions No     Caffeine Concern No     Occupational Exposure No     Hobby Hazards No     Sleep Concern No     Stress Concern No     Weight Concern No     Special Diet No     Back Care No     Exercise Yes     Comment: regular      Bike Helmet No     Seat Belt Yes     Comment: always     Self-Exams Yes     Comment: SBE  encouraged monthly     Parent/sibling w/ CABG, MI or angioplasty before 65F 55M? No   Social History Narrative    [place] - Telugu Cocker Spaniel - going to be 6 yrs old spring 2021---needs walks every day. --Helga Ibarra MD      January 13, 2021      Social Determinants of Health     Financial Resource Strain: Not on file   Food Insecurity: Not on file   Transportation Needs: Not on file   Physical  Activity: Not on file   Stress: Not on file   Social Connections: Not on file   Intimate Partner Violence: Not on file   Housing Stability: Not on file            Family History:       Family History   Problem Relation Age of Onset     Thyroid Disease Mother         Graves disease     Hypertension Mother      Diabetes Mother         type 2     Blood Disease Mother          from leukemia at age 78     Diabetes Daughter         Juvenile Diabetes     Heart Disease Father 62        MV problem with CHF  at 62/ from MI     Hypertension Father      Diabetes Son         Juvenile Diabetes     Cancer Maternal Uncle         type ??     Cancer Maternal Uncle         liver            Medications:     Current Outpatient Medications   Medication Sig     clindamycin (CLINDAGEL) 1 % external gel Apply topically daily     doxylamine (UNISOM) 25 MG TABS tablet Take 25 mg by mouth At Bedtime Takes nightly     escitalopram (LEXAPRO) 10 MG tablet Take one tab (10 mg) by mouth WITH 5 mg tab for total dose of 15 mg daily.     escitalopram (LEXAPRO) 5 MG tablet Take one tab (5 mg) by mouth WITH 10 mg tab for total dose of 15 mg daily.     lactobacillus rhamnosus, GG, (CULTURELL) capsule Take 1 capsule by mouth 2 times daily     lisinopril (ZESTRIL) 2.5 MG tablet Take 1 tablet (2.5 mg) by mouth daily     mirtazapine (REMERON) 15 MG tablet Take 0.5-1 tablets (7.5-15 mg) by mouth At Bedtime     No current facility-administered medications for this visit.              Review of Systems:   A comprehensive 10 point review of systems (constitutional, ENT, cardiac, peripheral vascular, lymphatic, respiratory, GI, , Musculoskeletal, skin, Neurological) was performed and found to be negative except as described in this note.     See intake form completed by patient

## 2022-07-01 NOTE — PATIENT INSTRUCTIONS
1. Baker's cyst of knee, left    2. Primary osteoarthritis of left knee      Steroid injection of the left knee was performed today in clinic    - Would not soak in a hot tub, bath or swimming pool for 48 hours  - Ok to shower  - Ice today and only do your normal amounts of activity  - The lidocaine (what is giving you pain relief right now) will likely stop working in 1-2 hours.  You will then have pain again, similar to before you received the injection. The corticosteroid will not start working until approximately 1-2 weeks from now.  In a small percentage of people, cortisone can cause flushing/redness in the face. This usually lasts for 1-3 days and resolves. Cool compress and Ibuprofen/Tylenol can help if this happens.    Physical Therapy orders have been placed with [facility].  You can call [phone]  to schedule at your convenience.       Follow up with Dr. Quiroz as needed.    Call my office with any questions or concerns, [phone].

## 2022-07-01 NOTE — LETTER
7/1/2022         RE: Jolynn Haji  [address] [place] [address]        Dear Colleague,    Thank you for referring your patient, Jolynn Haji, to the [facility] [place]. Please see a copy of my visit note below.        [facility]  Orthopaedic Surgery Consultation by Giovany Quiroz M.D.    Jolynn Haji MRN# [id number]   Age: 66 year old YOB: 1955     Requesting physician: Helga Hughes     Background history:  DX:  1. Hyperlipidemia  2. Hypertension  3. Basal cell carcinoma  4. Osteopenia  5. Raynaud's  6. Generalized anxiety disorder    TREATMENTS:  1. None           History of Present Illness:   66 year old female who presents her clinic because of chronic left knee pain.  Is been present for approximately 1 month after walking with her dog.  No clear antecedent traumatic event.  Patient states that she has been struggling with some knee pain for many years but that it is currently much worse.  In the past she has received an intra-articular injection with cortisone which alleviated her pain significantly.  The pain is felt in the back and lateral side of the knee.  The knee appears to swell.  She endorses the presence of some clicking.  No giving way.  Patient reports night pain and some initiation stiffness and soreness.  To mitigate the pain she has tried ice, elevation, over-the-counter analgesics.  She has not recently seen a physical therapist nor has had any recent injections.  Patient denies the presence of significant hip or lower back pain.  No motor or sensory deficits of left lower extremity.    Social:   Occupation: retired  Living situation: lives with   Hobbies / Sports: walking dog    Smoking: No  Alcohol: No  Illicit drug use: No         Physical Exam:     EXAMINATION pertinent findings:   PSYCH: Pleasant, healthy-appearing, alert, oriented x3, cooperative. Normal mood and affect.  VITAL SIGNS:  Last menstrual period 06/28/2001, not currently breastfeeding.  Reviewed nursing intake notes.   There is no height or weight on file to calculate BMI.  RESP: non labored breathing   ABD: benign, soft, non-tender, no acute peritoneal findings  SKIN: grossly normal   LYMPHATIC: grossly normal, no adenopathy, no extremity edema  NEURO: grossly normal , no motor deficits  VASCULAR: satisfactory perfusion of all extremities   MUSCULOSKELETAL:   Alignment: Varus alignment of left lower extremity.  Gait: Antalgic gait over left lower extremity.    The left hip exhibits a full range of motion.  No pain upon rotations.  Lasegue's test is negative.  No tenderness to greater trochanter region.    L knee: -0-0 .  Bouncy but painless endpoint in extension.  Significant pain throughout entire range of motion.  Straight leg raise +. No redness, warmth or skin changes present. Effusion Yes. Ligamentously stable in both ML and AP direction.  +1 laxity in ML direction most likely due to substance loss.  Normal PF tracking without crepitus. Apprehension -. Rabot -. Meniscal provocation tests are sensitive.  There is recognizable tenderness to palpation over the joint line.     Left LE:   Thigh and leg compartments soft and compressible   +Quad/TA/GSC/FHL/EHL   SILT DP/SP/Lenora/Saph/Tib nerve distributions   Palpable dorsalis pedis pulse            Data:   All laboratory data reviewed  All imaging studies reviewed by me personally.    XR knee left 7/20/2022:  My interpretation: Moderate to severe osteoarthritic changes of the left knee most notable in medial compartment with significant joint space narrowing, presence of marginal osteophytes and sclerosis.  Moderate to severe osteoarthritic changes of the patellofemoral joint most notably in the lateral facet.  Lateral compartment mild osteoarthritic changes.          Assessment and Plan:   Assessment:  66-year-old female presenting with chronic left knee pain due to underlying  severe osteoarthritic changes.  Nonoperative treatment measures have not been optimized yet.     Plan:  I had a long discussion with the patient regarding ongoing management options.  Reviewed surgical and nonsurgical treatments.  The non-surgical options include activity modification, pain medication, PT, bracing and injection therapy.  We discussed that given her wear pattern I do not anticipate bracing would provide her significant relief.  As for surgery we discussed the option total knee replacement surgery.  It would be my recommendation to attempt to optimize nonoperative treatment at this point in time.  This would consist of a physical therapy referral for range of motion, strengthening and stretching exercises in setting of underlying osteoarthritis of the left knee as well as an intra-articular injection with a combination of lidocaine and cortisone.  Patient understands and agrees to the treatment plan set forth.  After obtaining informed consent the left knee was injected without complications.  We will follow-up with patient on an as-needed basis.  We discussed that depending on the time interval we could repeat injection or discuss replacement surgery when patient returns.     More information on joint replacements can be found on : [email or web address]    Thank you for your referral.    Giovany Quiroz MD, PhD     Adult Reconstruction  [facility] Department of Orthopaedic Surgery    Large Joint Injection/Arthocentesis: L knee joint    Date/Time: 7/1/2022 11:20 AM  Performed by: Giovany Quiroz MD  Authorized by: Giovany Quiroz MD     Indications:  Pain  Needle Size:  21 G  Guidance: landmark guided    Approach:  Anterolateral  Location:  Knee      Medications:  40 mg triamcinolone 40 MG/ML; 3 mL lidocaine 1 %; 4 mL lidocaine 1 %  Outcome:  Tolerated well, no immediate complications  Procedure discussed: discussed  risks, benefits, and alternatives    Consent Given by:  Patient  Timeout: timeout called immediately prior to procedure    Prep: patient was prepped and draped in usual sterile fashion          DATA for DOCUMENTATION:         Past Medical History:     Patient Active Problem List   Diagnosis     Essential hypertension with goal blood pressure less than 140/90     Hidradenitis     Localized osteoarthritis of hand     Synovial cyst of popliteal space     DERMATOPHYTOSIS OF NAIL- toenails      Lipidosis     Osteopenia     Postmenopausal     NUMBNESS AND TINGLING OF FOOT - bilateral -mild      Numbness of feet- distal feet R>L      Stress incontinence, female - mild     Cough     Acute bronchitis- recurrent- ? related to mold in school building- pt has since retired and no further bronchitis     Rosacea     History of migraine headaches- assoc. with nausea/vomiting - resolved around menopause - were  premenstrual      Dupuytren's disease of palm - right 4th digit flexor tendon     Sun-damaged skin     Seasonal allergic rhinitis     Basal cell carcinoma of leg, right     Adjustment disorder with mixed anxiety and depressed mood- mild-moderate  at this time     Family history of celiac disease     TIFFANY (generalized anxiety disorder)     Insomnia, unspecified type     Chronic constipation     Raynaud's phenomenon without gangrene     Other secondary osteoarthritis of left knee     Primary localized osteoarthrosis of left lower leg     Hyperlipidemia LDL goal <130     Basal cell carcinoma     Colon polyps     Screening for cervical cancer     Adjustment insomnia     Past Medical History:   Diagnosis Date     Abnormal glandular Papanicolaou smear of cervix- ASCUS in 2000 12/5/2003    ASC H- 2000     Basal cell carcinoma of leg, right 05/2016    Dr Rogers     Colon polyps 05/2018    tubular adenoam - due 5 yrs - 2 mm     Concussion without loss of consciousness 4/12/2017     Hidradenitis     left  groin     HSIL (high grade  squamous intraepithelial lesion) on Pap smear of cervix 7/5/2000    Normal paps from 2001 to 2016/cc     Hypertension goal BP (blood pressure) < 140/90      OA (osteoarthritis) of knee     moderate     Postmenopausal since 7/2008      Shingles 7/23/2010    left      Squamous cell carcinoma of skin, unspecified      Synovial cyst of popliteal space        Also see scanned health assessment forms.       Past Surgical History:     Past Surgical History:   Procedure Laterality Date     COLONOSCOPY  05/2018    polyps, tubular - 2 mm - due 5 yrs     ZZC NONSPECIFIC PROCEDURE  2000    Colposcopy            Social History:     Social History     Socioeconomic History     Marital status:      Spouse name: Zach     Number of children: 2     Years of education: 16     Highest education level: Not on file   Occupational History     Occupation: teacher first grade -retired at age 60     Comment: [employer] distric [phone]     Employer: [employer]   Tobacco Use     Smoking status: Never Smoker     Smokeless tobacco: Never Used   Substance and Sexual Activity     Alcohol use: Yes     Alcohol/week: 0.0 - 1.0 standard drinks     Comment: 2 per month     Drug use: No     Comment: no herbal meds either     Sexual activity: Yes     Partners: Male     Birth control/protection: Surgical     Comment: -was on DepoProvera since 2000-2/2008 -  had vasectomy   Other Topics Concern      Service No     Blood Transfusions No     Caffeine Concern No     Occupational Exposure No     Hobby Hazards No     Sleep Concern No     Stress Concern No     Weight Concern No     Special Diet No     Back Care No     Exercise Yes     Comment: regular      Bike Helmet No     Seat Belt Yes     Comment: always     Self-Exams Yes     Comment: SBE  encouraged monthly     Parent/sibling w/ CABG, MI or angioplasty before 65F 55M? No   Social History Narrative    Casper - Sami Cocker Spaniel - going to be 6 yrs old spring  ---needs walks every day. --Helga Ibarra MD      2021      Social Determinants of Health     Financial Resource Strain: Not on file   Food Insecurity: Not on file   Transportation Needs: Not on file   Physical Activity: Not on file   Stress: Not on file   Social Connections: Not on file   Intimate Partner Violence: Not on file   Housing Stability: Not on file            Family History:       Family History   Problem Relation Age of Onset     Thyroid Disease Mother         Graves disease     Hypertension Mother      Diabetes Mother         type 2     Blood Disease Mother          from leukemia at age 78     Diabetes Daughter         Juvenile Diabetes     Heart Disease Father 62        MV problem with CHF  at 62/ from MI     Hypertension Father      Diabetes Son         Juvenile Diabetes     Cancer Maternal Uncle         type ??     Cancer Maternal Uncle         liver            Medications:     Current Outpatient Medications   Medication Sig     clindamycin (CLINDAGEL) 1 % external gel Apply topically daily     doxylamine (UNISOM) 25 MG TABS tablet Take 25 mg by mouth At Bedtime Takes nightly     escitalopram (LEXAPRO) 10 MG tablet Take one tab (10 mg) by mouth WITH 5 mg tab for total dose of 15 mg daily.     escitalopram (LEXAPRO) 5 MG tablet Take one tab (5 mg) by mouth WITH 10 mg tab for total dose of 15 mg daily.     lactobacillus rhamnosus, GG, (CULTURELL) capsule Take 1 capsule by mouth 2 times daily     lisinopril (ZESTRIL) 2.5 MG tablet Take 1 tablet (2.5 mg) by mouth daily     mirtazapine (REMERON) 15 MG tablet Take 0.5-1 tablets (7.5-15 mg) by mouth At Bedtime     No current facility-administered medications for this visit.              Review of Systems:   A comprehensive 10 point review of systems (constitutional, ENT, cardiac, peripheral vascular, lymphatic, respiratory, GI, , Musculoskeletal, skin, Neurological) was performed and found to be negative  except as described in this note.     See intake form completed by patient        Again, thank you for allowing me to participate in the care of your patient.        Sincerely,        Giovany Quiroz MD

## 2022-07-11 ENCOUNTER — OFFICE VISIT (OUTPATIENT)
Dept: FAMILY MEDICINE | Facility: CLINIC | Age: 67
End: 2022-07-11
Payer: COMMERCIAL

## 2022-07-11 VITALS
DIASTOLIC BLOOD PRESSURE: 70 MMHG | BODY MASS INDEX: 19.55 KG/M2 | OXYGEN SATURATION: 97 % | SYSTOLIC BLOOD PRESSURE: 122 MMHG | WEIGHT: 129 LBS | TEMPERATURE: 98.5 F | HEART RATE: 99 BPM | HEIGHT: 68 IN

## 2022-07-11 DIAGNOSIS — R41.843 PSYCHOMOTOR RETARDATION: ICD-10-CM

## 2022-07-11 DIAGNOSIS — F43.23 ADJUSTMENT DISORDER WITH MIXED ANXIETY AND DEPRESSED MOOD: Primary | ICD-10-CM

## 2022-07-11 PROCEDURE — 99214 OFFICE O/P EST MOD 30 MIN: CPT | Performed by: FAMILY MEDICINE

## 2022-07-11 ASSESSMENT — ANXIETY QUESTIONNAIRES
GAD7 TOTAL SCORE: 5
3. WORRYING TOO MUCH ABOUT DIFFERENT THINGS: SEVERAL DAYS
6. BECOMING EASILY ANNOYED OR IRRITABLE: NOT AT ALL
4. TROUBLE RELAXING: NOT AT ALL
7. FEELING AFRAID AS IF SOMETHING AWFUL MIGHT HAPPEN: NOT AT ALL
GAD7 TOTAL SCORE: 5
5. BEING SO RESTLESS THAT IT IS HARD TO SIT STILL: SEVERAL DAYS
1. FEELING NERVOUS, ANXIOUS, OR ON EDGE: MORE THAN HALF THE DAYS
8. IF YOU CHECKED OFF ANY PROBLEMS, HOW DIFFICULT HAVE THESE MADE IT FOR YOU TO DO YOUR WORK, TAKE CARE OF THINGS AT HOME, OR GET ALONG WITH OTHER PEOPLE?: SOMEWHAT DIFFICULT
7. FEELING AFRAID AS IF SOMETHING AWFUL MIGHT HAPPEN: NOT AT ALL
GAD7 TOTAL SCORE: 5
2. NOT BEING ABLE TO STOP OR CONTROL WORRYING: SEVERAL DAYS

## 2022-07-11 ASSESSMENT — PATIENT HEALTH QUESTIONNAIRE - PHQ9
SUM OF ALL RESPONSES TO PHQ QUESTIONS 1-9: 4
10. IF YOU CHECKED OFF ANY PROBLEMS, HOW DIFFICULT HAVE THESE PROBLEMS MADE IT FOR YOU TO DO YOUR WORK, TAKE CARE OF THINGS AT HOME, OR GET ALONG WITH OTHER PEOPLE: SOMEWHAT DIFFICULT
SUM OF ALL RESPONSES TO PHQ QUESTIONS 1-9: 4

## 2022-07-11 NOTE — PROGRESS NOTES
Assessment & Plan       ICD-10-CM    1. Adjustment disorder with mixed anxiety and depressed mood- mild-moderate  at this time- improving steadily   F43.23    2. 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    Ordering of each unique test  Prescription drug management  24 minutes spent on the date of the encounter doing chart review, history and exam, documentation and further activities per the note       MEDICATIONS:   No orders of the defined types were placed in this encounter.         - Continue other medications without change  Regular exercise  See Patient Instructions    Return in 6 weeks (on 8/25/2022) for depression/anxiety follow up, w/ Dr. SUE for 40 minute appointment.        Helga Ibarra MD  [facility]    Chanelle Tristan is a 66 year old, presenting for the following health issues: Here today with daughter Malini Anderson Medication  taking 1/2 of a 15mg mirtazepine sleeps from 10pm to 1am, then awakens from restlessl egs   Taking Mylanta - 1 tsp nightly of magnesium for leg cramps. - no more cramps.     Saw Dr. Grajeda - for her left knee and baker's  -got a kenalog injection from him and is now walking the dog Casper again.     Going out to lunch with her girlfriends tomorrow.      Anxiety - some heart palpitations - just not bothering her much anymore.  Feels improving slowly, but surely, but not fast enough for her.     History of Present Illness       Mental Health Follow-up:  Patient presents to follow-up on Depression & Anxiety.Patient's depression since last visit has been:  No change  The patient is not having other symptoms associated with depression.  Patient's anxiety since last visit has been:  Worse  The patient is not having other symptoms associated with anxiety.  Any significant  life events: No  Patient is feeling anxious or having panic attacks.  Patient has no concerns about alcohol or drug use.    She eats 2-3 servings of fruits and vegetables daily.She consumes 0 sweetened beverage(s) daily.She exercises with enough effort to increase her heart rate 9 or less minutes per day.    She is taking medications regularly.    Today's PHQ-9         PHQ-9 Total Score: 4    PHQ-9 Q9 Thoughts of better off dead/self-harm past 2 weeks :   Not at all    How difficult have these problems made it for you to do your work, take care of things at home, or get along with other people: Somewhat difficult  Today's TIFFANY-7 Score: 5     PHQ 6/8/2022 6/20/2022 7/11/2022   PHQ-9 Total Score 7 6 4   Q9: Thoughts of better off dead/self-harm past 2 weeks Not at all Not at all Not at all   Some encounter information is confidential and restricted. Go to Review Flowsheets activity to see all data.     TIFFANY-7 SCORE 6/8/2022 6/20/2022 7/11/2022   Total Score - - -   Total Score 6 (mild anxiety) - 5 (mild anxiety)   Total Score 6 3 5       Patient Active Problem List   Diagnosis     Essential hypertension with goal blood pressure less than 140/90     Hidradenitis     Localized osteoarthritis of hand     Synovial cyst of popliteal space     DERMATOPHYTOSIS OF NAIL- toenails      Lipidosis     Osteopenia     Postmenopausal     NUMBNESS AND TINGLING OF FOOT - bilateral -mild      Numbness of feet- distal feet R>L      Stress incontinence, female - mild     Cough     Acute bronchitis- recurrent- ? related to mold in school building- pt has since retired and no further bronchitis     Rosacea     History of migraine headaches- assoc. with nausea/vomiting - resolved around menopause - were  premenstrual      Dupuytren's disease of palm - right 4th digit flexor tendon     Sun-damaged skin     Seasonal allergic rhinitis     Basal cell carcinoma of leg, right     Adjustment disorder with mixed anxiety and depressed mood-  mild-moderate  at this time     Family history of celiac disease     TIFFANY (generalized anxiety disorder)     Insomnia, unspecified type     Chronic constipation     Raynaud's phenomenon without gangrene     Other secondary osteoarthritis of left knee     Primary localized osteoarthrosis of left lower leg     Hyperlipidemia LDL goal <130     Basal cell carcinoma     Colon polyps     Screening for cervical cancer     Adjustment insomnia     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 30 tablet 0     escitalopram (LEXAPRO) 5 MG tablet Take one tab (5 mg) by mouth WITH 10 mg tab for total dose of 15 mg daily. 30 tablet 0     lactobacillus rhamnosus, GG, (CULTURELL) capsule Take 1 capsule by mouth 2 times daily       lisinopril (ZESTRIL) 2.5 MG tablet Take 1 tablet (2.5 mg) by mouth daily 90 tablet 3     venlafaxine (EFFEXOR XR) 37.5 MG 24 hr capsule Take 1 capsule (37.5 mg) by mouth daily 7 capsule 0     venlafaxine (EFFEXOR XR) 75 MG 24 hr capsule Take 1 capsule (75 mg) by mouth daily 30 capsule 0     zolpidem (AMBIEN) 5 MG tablet Take 0.5-1 tablets (2.5-5 mg) by mouth nightly as needed for sleep 14 tablet 0          Allergies   Allergen Reactions     Abilify [Aripiprazole] Other (See Comments)     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      Objective    /70 (BP Location: Left arm, Patient Position:  "Chair, Cuff Size: Adult Regular)   Pulse 99   Temp 98.5  F (36.9  C) (Tympanic)   Ht 1.715 m (5' 7.5\")   Wt 58.5 kg (129 lb)   LMP 06/28/2001   SpO2 97%   Breastfeeding No   BMI 19.91 kg/m    Body mass index is 19.91 kg/m .  Physical Exam   GENERAL: healthy, alert and no distress  EYES: Eyes grossly normal to inspection,and conjunctivae and sclerae normal  HENT:  nose and mouth without ulcers or lesions  RESP: lungs clear to auscultation - no rales, rhonchi or wheezes  CV: regular rate and rhythm, normal S1 S2, no S3 or S4, no murmur, click or rub, no peripheral edema and peripheral pulses strong  PSYCH: mentation appears normal, affect improved from previous and near  Normal/bright, continually improving over previous, but not quite what it was previously.      Office Visit on 04/25/2022   Component Date Value Ref Range Status     Sodium 04/25/2022 137  133 - 144 mmol/L Final     Potassium 04/25/2022 4.4  3.4 - 5.3 mmol/L Final     Chloride 04/25/2022 105  94 - 109 mmol/L Final     Carbon Dioxide (CO2) 04/25/2022 26  20 - 32 mmol/L Final     Anion Gap 04/25/2022 6  3 - 14 mmol/L Final     Urea Nitrogen 04/25/2022 23  7 - 30 mg/dL Final     Creatinine 04/25/2022 0.78  0.52 - 1.04 mg/dL Final     Calcium 04/25/2022 9.3  8.5 - 10.1 mg/dL Final     Glucose 04/25/2022 104 (A) 70 - 99 mg/dL Final     Alkaline Phosphatase 04/25/2022 64  40 - 150 U/L Final     AST 04/25/2022 11  0 - 45 U/L Final     ALT 04/25/2022 21  0 - 50 U/L Final     Protein Total 04/25/2022 7.1  6.8 - 8.8 g/dL Final     Albumin 04/25/2022 4.0  3.4 - 5.0 g/dL Final     Bilirubin Total 04/25/2022 0.4  0.2 - 1.3 mg/dL Final     GFR Estimate 04/25/2022 83  >60 mL/min/1.73m2 Final    Effective December 21, 2021 eGFRcr in adults is calculated using the 2021 CKD-EPI creatinine equation which includes age and gender (Mercedes et al., NEJM, DOI: 10.1056/WMOXdg0326201)     TSH 04/25/2022 1.30  0.40 - 4.00 mU/L Final     25 OH Vitamin D2 04/25/2022 <5  " ug/L Final     25 OH Vitamin D3 04/25/2022 33  ug/L Final     25 OH Vit D Total 04/25/2022 <38  20 - 75 ug/L Final    Season, race, dietary intake, and treatment affect the concentration of 25-hydroxy-Vitamin D. Values may decrease during winter months and increase during summer months. Values 20-29 ug/L may indicate Vitamin D insufficiency and values <20 ug/L may indicate Vitamin D deficiency.     WBC Count 04/25/2022 8.1  4.0 - 11.0 10e3/uL Final     RBC Count 04/25/2022 4.58  3.80 - 5.20 10e6/uL Final     Hemoglobin 04/25/2022 13.6  11.7 - 15.7 g/dL Final     Hematocrit 04/25/2022 41.1  35.0 - 47.0 % Final     MCV 04/25/2022 90  78 - 100 fL Final     MCH 04/25/2022 29.7  26.5 - 33.0 pg Final     MCHC 04/25/2022 33.1  31.5 - 36.5 g/dL Final     RDW 04/25/2022 12.9  10.0 - 15.0 % Final     Platelet Count 04/25/2022 252  150 - 450 10e3/uL Final     % Neutrophils 04/25/2022 72  % Final     % Lymphocytes 04/25/2022 17  % Final     % Monocytes 04/25/2022 8  % Final     % Eosinophils 04/25/2022 2  % Final     % Basophils 04/25/2022 1  % Final     % Immature Granulocytes 04/25/2022 0  % Final     Absolute Neutrophils 04/25/2022 5.9  1.6 - 8.3 10e3/uL Final     Absolute Lymphocytes 04/25/2022 1.4  0.8 - 5.3 10e3/uL Final     Absolute Monocytes 04/25/2022 0.6  0.0 - 1.3 10e3/uL Final     Absolute Eosinophils 04/25/2022 0.2  0.0 - 0.7 10e3/uL Final     Absolute Basophils 04/25/2022 0.1  0.0 - 0.2 10e3/uL Final     Absolute Immature Granulocytes 04/25/2022 0.0  <=0.4 10e3/uL Final               .  ..  Answers for HPI/ROS submitted by the patient on 7/11/2022  If you checked off any problems, how difficult have these problems made it for you to do your work, take care of things at home, or get along with other people?: Somewhat difficult  PHQ9 TOTAL SCORE: 4  TIFFANY 7 TOTAL SCORE: 5

## 2022-07-18 ENCOUNTER — THERAPY VISIT (OUTPATIENT)
Dept: PHYSICAL THERAPY | Facility: CLINIC | Age: 67
End: 2022-07-18
Payer: COMMERCIAL

## 2022-07-18 DIAGNOSIS — M25.562 CHRONIC PAIN OF LEFT KNEE: ICD-10-CM

## 2022-07-18 DIAGNOSIS — G89.29 CHRONIC PAIN OF LEFT KNEE: ICD-10-CM

## 2022-07-18 DIAGNOSIS — M17.12 PRIMARY OSTEOARTHRITIS OF LEFT KNEE: ICD-10-CM

## 2022-07-18 PROCEDURE — 97161 PT EVAL LOW COMPLEX 20 MIN: CPT | Mod: GP | Performed by: PHYSICAL THERAPIST

## 2022-07-18 PROCEDURE — 97110 THERAPEUTIC EXERCISES: CPT | Mod: GP | Performed by: PHYSICAL THERAPIST

## 2022-07-18 ASSESSMENT — ACTIVITIES OF DAILY LIVING (ADL)
STIFFNESS: I DO NOT HAVE THE SYMPTOM
SWELLING: I DO NOT HAVE THE SYMPTOM
HOW_WOULD_YOU_RATE_THE_CURRENT_FUNCTION_OF_YOUR_KNEE_DURING_YOUR_USUAL_DAILY_ACTIVITIES_ON_A_SCALE_FROM_0_TO_100_WITH_100_BEING_YOUR_LEVEL_OF_KNEE_FUNCTION_PRIOR_TO_YOUR_INJURY_AND_0_BEING_THE_INABILITY_TO_PERFORM_ANY_OF_YOUR_USUAL_DAILY_ACTIVITIES?: 100
WEAKNESS: I DO NOT HAVE THE SYMPTOM
KNEEL ON THE FRONT OF YOUR KNEE: ACTIVITY IS NOT DIFFICULT
LIMPING: I DO NOT HAVE THE SYMPTOM
WALK: ACTIVITY IS NOT DIFFICULT
PAIN: I DO NOT HAVE THE SYMPTOM
AS_A_RESULT_OF_YOUR_KNEE_INJURY,_HOW_WOULD_YOU_RATE_YOUR_CURRENT_LEVEL_OF_DAILY_ACTIVITY?: NORMAL
RISE FROM A CHAIR: ACTIVITY IS NOT DIFFICULT
SQUAT: ACTIVITY IS NOT DIFFICULT
KNEE_ACTIVITY_OF_DAILY_LIVING_SUM: 70
SIT WITH YOUR KNEE BENT: ACTIVITY IS NOT DIFFICULT
HOW_WOULD_YOU_RATE_THE_OVERALL_FUNCTION_OF_YOUR_KNEE_DURING_YOUR_USUAL_DAILY_ACTIVITIES?: NORMAL
GO DOWN STAIRS: ACTIVITY IS NOT DIFFICULT
KNEE_ACTIVITY_OF_DAILY_LIVING_SCORE: 100
RAW_SCORE: 70
STAND: ACTIVITY IS NOT DIFFICULT
GIVING WAY, BUCKLING OR SHIFTING OF KNEE: I DO NOT HAVE THE SYMPTOM
GO UP STAIRS: ACTIVITY IS NOT DIFFICULT

## 2022-07-18 NOTE — PROGRESS NOTES
[facility]    OUTPATIENT Physical Therapy ORTHOPEDIC EVALUATION  PLAN OF TREATMENT FOR OUTPATIENT REHABILITATION  (COMPLETE FOR INITIAL CLAIMS ONLY)  Patient's Last Name, First Name, M.I.  YOB: 1955  Jolynn Haji    Provider s Name:  [facility]   Medical Record No.  [id number]   Start of Care Date:  07/18/22   Onset Date:   07/01/22 (MD order)   Type:     _X__PT   ___OT Medical Diagnosis:    Encounter Diagnoses   Name Primary?    Primary osteoarthritis of left knee     Chronic pain of left knee         Treatment Diagnosis:  Left Knee OA        Goals:     07/18/22 0500   Body Part   Goals listed below are for Knee   Goal #1   Goal #1 ambulation   Previous Functional Level No restrictions   Current Functional Level Minutes patient can walk   Performance Level <30 minutes pain >5/10   STG Target Performance Minutes patient will be able to walk   Performance Level 30-60 minutes pain <3/10   Rationale for safe household ambulation;for safe outdoor household ambulation;for safe community ambulation;to promote a healthy and active lifestyle   Due Date 08/15/22    LTG Target Performance Hours patient will be able to walk   Performance Level Up to 2 hours with pain <3/10   Rationale for safe household ambulation;for safe outdoor household ambulation;for safe community ambulation;for safe work place ambulation;to promote a healthy and active lifestyle   Due Date 10/10/22       Therapy Frequency:  1x/week tapering to 1x/2 weeks  Predicted Duration of Therapy Intervention:  8 week    JENNY SEO, PT                 I CERTIFY THE NEED FOR THESE SERVICES FURNISHED UNDER        THIS PLAN OF TREATMENT AND WHILE UNDER MY CARE     (Physician attestation of this document indicates review and certification of the therapy plan).                     Certification Date From:   07/18/22   Certification Date To:  10/10/22    Referring Provider:  Giovany Quiroz    Initial Assessment        See Epic Evaluation SOC Date: 07/18/22

## 2022-07-18 NOTE — PROGRESS NOTES
Physical Therapy Initial Evaluation  Subjective:  The history is provided by the patient. No  was used.   Patient Health History  Jolynn Haji being seen for left knee .     Date of Onset: a long time ago.   Problem occurred: walking, over use and age    Pain is reported as 0/10 on pain scale.  General health as reported by patient is excellent.  Pertinent medical history includes: depression.      Other medical allergies details: augmenton .       Current medications:  Anti-depressants and sleep medication.    Current occupation is Retired .                     Therapist Generated HPI Evaluation  Problem details: The patient reports to therapy with a chief complaint of Left knee pain due to osteoarthrits. She reports initially injuring the knee while walking her dog, but that there wasn't a specific incident that caused the pain. Imaging revealed moderate to severe OA of the knee. She had an injection recently and reports that this significantly improved pain in the knee and she is no longer having pain. Pt does also have a Baker's cyst     Xray: 1.  Left knee: Moderate degenerative arthrosis, stable. This includes  medial compartment narrowing, patellofemoral compartment narrowing,  tricompartmental marginal osteophytosis. No fracture or joint  effusion. Bone demineralization.  2.  Right knee: Mild degenerative arthrosis. Bone demineralization. No  Fracture.  .         Type of problem:  Left knee.    This is a chronic condition.  Condition occurred with:  Repetition/overuse and degenerative joint disease.    Patient reports pain:  Anterior and posterior.  Pain is described as aching and sharp and is intermittent.  Pain is the same all the time.  Since onset symptoms are rapidly improving.  Associated symptoms:  Loss of motion/stiffness and loss of strength. Symptoms are exacerbated by descending stairs, standing, kneeling, ascending stairs, walking, bending/squatting and running  and relieved by  ice.  Special tests included:  X-ray.  Past treatment: injection. There was significant improvement following previous treatment.  Barriers include:  None as reported by patient.                        Objective:    Gait:  Lacking L TKE, minimal swing phase.   Gait Type:  Antalgic                                                           Knee Evaluation:  ROM:    AROM      Extension: Left: lacking 10 deg     Right:   Flexion: Left: 130 deg    Right:  PROM      Extension: Left: lacking 8 deg   Right:   Flexion: Left: 130 deg    Right:       Strength:     Extension:  Left: 4/5   Pain:      Right: 5-/5   Pain:  Flexion:  Left: 5/5   Pain:      Right: 5/5   Pain:    Quad Set Left: Fair    Pain:   Quad Set Right: Good    Pain:    Special Tests:     Left knee negative for the following special tests:  Meninscal and Patellar compression    Palpation:    Left knee tenderness present at:  IT Band; Popliteal and Patellar Inferior              General     ROS    Assessment/Plan:    Patient is a 66 year old female with left side knee complaints.    Patient has the following significant findings with corresponding treatment plan.                Diagnosis 1:  Left Knee OA  Pain -  hot/cold therapy, US, electric stimulation, manual therapy, splint/taping/bracing/orthotics and home program  Decreased ROM/flexibility - manual therapy and therapeutic exercise  Decreased joint mobility - manual therapy and therapeutic exercise  Decreased strength - therapeutic exercise and therapeutic activities  Inflammation - cold therapy, US and electric stimulation  Impaired gait - gait training  Impaired muscle performance - neuro re-education  Decreased function - therapeutic activities      Cumulative Therapy Evaluation is: Low complexity.    Previous and current functional limitations:  (See Goal Flow Sheet for this information)    Short term and Long term goals: (See Goal Flow Sheet for this information)     Communication ability:  Patient  appears to be able to clearly communicate and understand verbal and written communication and follow directions correctly.  Treatment Explanation - The following has been discussed with the patient:   RX ordered/plan of care  Anticipated outcomes  Possible risks and side effects  This patient would benefit from PT intervention to resume normal activities.   Rehab potential is good.    Frequency:  1 X month, once daily  Duration:  for 3-4 months   Discharge Plan:  Achieve all LTG.  Independent in home treatment program.  Reach maximal therapeutic benefit.    Please refer to the daily flowsheet for treatment today, total treatment time and time spent performing 1:1 timed codes.

## 2022-07-19 ENCOUNTER — TELEPHONE (OUTPATIENT)
Dept: PSYCHIATRY | Facility: CLINIC | Age: 67
End: 2022-07-19

## 2022-07-19 NOTE — TELEPHONE ENCOUNTER
Called patient and explained provider's directives and she verbalized that she will wait for her next appointment, weigh it out by distracting herself and engaging with family, friends and activities she loves. Patient will call back and connect as needed. She reported that the 20 mg Lexapro made her like a zombie when she previously took it. She is considering it, but will try the above first.

## 2022-07-19 NOTE — TELEPHONE ENCOUNTER
Pt could increase Lexapro to 20 mg if interested before her appt. Let me know if pt would like to change her Lexapro to 20 mg and I will update med list. I also agree with RN's other ideas for patient's anxiety since patient may not be interested in medication changes. If pt not interested in changing meds before appt RN can close encounter.  Thanks!

## 2022-07-19 NOTE — TELEPHONE ENCOUNTER
Called patient and she reported the following.  Patient reported last week that she was experiencing more anxiety, ans she will wake up and already anxious. I do not want to go on more medications. The Lexapro and at [facility] mirtazapine-both helps but not with the anxiety. I have had some stressful things happen. I have not tried walking out due to troubles with my knee-I am not driving. I have not driven for six week. Encouraged patient to take advantage of the beautiful summer days, do things she enjoys doing and engage with family and friends and that might help and she said okay.

## 2022-07-19 NOTE — TELEPHONE ENCOUNTER
Reason for call:  Other   Patient called regarding (reason for call): call back  Additional comments: pt is requesting a callback from her care team, she advised she is experiencing more anxiety than usual within the last week    Phone number to reach patient:  Cell number on file:    Telephone Information:   Mobile [phone]       Best Time:  asap    Can we leave a detailed message on this number?  YES    Travel screening: Not Applicable

## 2022-07-25 NOTE — TELEPHONE ENCOUNTER
Patient was instructed to call back if she wasn't feeling better. Patient is waking up every morning with panic attacks. Call patient at: [phone]

## 2022-07-25 NOTE — TELEPHONE ENCOUNTER
Please ask patient to use mirtazapine for a few nights.  She has an appointment with Dr. Pompa on Thursday, they can discuss other options then.    Thanks,  Jo Ann Vásquez MD  [facility]aborative [facility] Psychiatry  [facility]

## 2022-07-25 NOTE — TELEPHONE ENCOUNTER
Called patient and informed her of Dr. Vásquez's directives. She noted that she has been taking half of the 15 mg Mirtazapine. RN reminded patient that she can take up to one tablet, however, patient wants to know if it is okay with provider for her to take 1 tablet. She is very hesitant to take 1 tablet as she states she does not want to wake up and be like a zombie, however, she continues to wake up to panic attacks on the 0.5 tablet.

## 2022-07-26 NOTE — TELEPHONE ENCOUNTER
Ok for pt to take full tablet. Full tablet should not be much more sedating than the half tablet but could help more with the panic attacks.

## 2022-07-28 ENCOUNTER — VIRTUAL VISIT (OUTPATIENT)
Dept: PSYCHOLOGY | Facility: CLINIC | Age: 67
End: 2022-07-28
Payer: COMMERCIAL

## 2022-07-28 ENCOUNTER — VIRTUAL VISIT (OUTPATIENT)
Dept: PSYCHIATRY | Facility: CLINIC | Age: 67
End: 2022-07-28
Payer: COMMERCIAL

## 2022-07-28 DIAGNOSIS — G47.00 INSOMNIA, UNSPECIFIED TYPE: ICD-10-CM

## 2022-07-28 DIAGNOSIS — Z87.820 HISTORY OF TRAUMATIC BRAIN INJURY: ICD-10-CM

## 2022-07-28 DIAGNOSIS — F39 MOOD DISORDER (H): ICD-10-CM

## 2022-07-28 DIAGNOSIS — F41.1 GAD (GENERALIZED ANXIETY DISORDER): ICD-10-CM

## 2022-07-28 DIAGNOSIS — G47.00 INSOMNIA, UNSPECIFIED TYPE: Primary | ICD-10-CM

## 2022-07-28 DIAGNOSIS — F41.1 GAD (GENERALIZED ANXIETY DISORDER): Primary | ICD-10-CM

## 2022-07-28 DIAGNOSIS — G21.19 OTHER DRUG-INDUCED SECONDARY PARKINSONISM (H): ICD-10-CM

## 2022-07-28 PROCEDURE — 99214 OFFICE O/P EST MOD 30 MIN: CPT | Mod: 95 | Performed by: PSYCHIATRY & NEUROLOGY

## 2022-07-28 PROCEDURE — 90832 PSYTX W PT 30 MINUTES: CPT | Mod: 95 | Performed by: PSYCHOLOGIST

## 2022-07-28 RX ORDER — VENLAFAXINE HYDROCHLORIDE 37.5 MG/1
37.5 CAPSULE, EXTENDED RELEASE ORAL DAILY
Qty: 7 CAPSULE | Refills: 0 | Status: SHIPPED | OUTPATIENT
Start: 2022-07-28 | End: 2022-08-25

## 2022-07-28 RX ORDER — VENLAFAXINE HYDROCHLORIDE 75 MG/1
75 CAPSULE, EXTENDED RELEASE ORAL DAILY
Qty: 30 CAPSULE | Refills: 0 | Status: SHIPPED | OUTPATIENT
Start: 2022-08-03 | End: 2022-08-11

## 2022-07-28 RX ORDER — ZOLPIDEM TARTRATE 5 MG/1
2.5-5 TABLET ORAL
Qty: 14 TABLET | Refills: 0 | Status: SHIPPED | OUTPATIENT
Start: 2022-07-28 | End: 2022-08-11

## 2022-07-28 NOTE — PROGRESS NOTES
[facility]  July 28, 2022      Behavioral Health Clinician Progress Note    Patient Name: Jolynn Haji           Service Type:  Individual      Service Location:   [facility]     Session Start Time: 10:00 am  Session End Time: 10:20 am      Session Length: 16 - 37      Attendees: Patient     Service Modality:  Video Visit:      Provider verified identity through the following two step process.  Patient provided:  Patient is known previously to provider    Telemedicine Visit: The patient's condition can be safely assessed and treated via synchronous audio and visual telemedicine encounter.      Reason for Telemedicine Visit: Services only offered telehealth    Originating Site (Patient Location): Patient's home    Distant Site (Provider Location): Provider Remote Setting- Home Office    Consent:  The patient/guardian has verbally consented to: the potential risks and benefits of telemedicine (video visit) versus in person care; bill my insurance or make self-payment for services provided; and responsibility for payment of non-covered services.     Patient would like the video invitation sent by:  My Chart    Mode of Communication:  Video Conference via [facility]    As the provider I attest to compliance with applicable laws and regulations related to telemedicine.    Visit Activities (Refresh list every visit): [facility] Only    Diagnostic Assessment Date: 05/12/2022  Treatment Plan Review Date: 08/24/2022  See Flowsheets for today's PHQ-9 and TIFFANY-7 results  Previous PHQ-9:   PHQ-9 SCORE 6/8/2022 6/20/2022 7/11/2022   PHQ-9 Total Score - - -   PHQ-9 Total Score MyChart 7 (Mild depression) - 4 (Minimal depression)   PHQ-9 Total Score 7 6 4   Some encounter information is confidential and restricted. Go to Review Flowsheets activity to see all data.     Previous TIFFANY-7:   TIFFANY-7 SCORE 6/8/2022 6/20/2022 7/11/2022   Total Score - - -   Total Score 6 (mild anxiety) - 5 (mild  "anxiety)   Total Score 6 3 5       KARINA LEVEL:  KARINA Score (Last Two) 8/27/2010 2/22/2011   KARINA Raw Score 51 49   Activation Score 91.6 82.8   KARINA Level 4 4       DATA  Extended Session (60+ minutes): No  Interactive Complexity: No  Crisis: No  [facility] Patient: No    Treatment Objective(s) Addressed in This Session:  Target Behavior(s): anxiety    Anxiety: will develop more effective coping skills to manage anxiety symptoms    Current Stressors / Issues:  Reported that she was doing well until about 10 days ago when she started waking up with significant anxiety for several hours rating the intensity as 8/10. \"I've never had that before. I had to cancel coffee with my friend because I was so anxious.\" She described the anxiety as her heart racing and breathing heavy. She notices that the anxiety is better in the afternoon and gone by the evening. Mirtazapine helped initially for 2 days, but not as much now. The increase in Lexapro has been fine. She would like to know why she might be waking up with this kind of anxiety in the mornings and when it might end.       06/14/2022:  Reported that she has not been able to notice a difference by lowering the Abilify. Reported that she is still feeling depressed and does not feel it has gotten any better/worse than when she first started with CCPS. She is waking up with cramps. She would like to know how long it will take for her to feel better and would like to know how much she needs to push herself to do things. She spoke about going to lunch with some friends and had some anticipation anxiety. She worried that she would not be able to stay focused on the conversation and participate. The anxiety did go away when she got there but she felt tired and had to leave about an hour later which is unusual as they usually have lunch for about 3 hours.       05/24/2022:  Reported that she can focus \"a little longer than before\" and she feels she is \"staying in the present\" rather than " "worrying about the next thing to come. Her sleep is a little better. Instead of waking up at 1:30am, she sleeps until about 4:30am, uses the bathroom, and she can go back to sleep. She still feels tired during the day and estimated that it is about the same as before. She said that she is able to complete tasks. \"I won't win any races but I can get them done now.\" She noted that she had no concerns about her current medications.      05/12/2022 (DA):  \"I'm hoping we can figure out what triggered this setback.\" She was diagnosed with anxiety and depression after a concussion a concussion in 04/2017. She had been sick at the time, got up one night and fell. She is uncertain where on her head she landed. She was on Lexapro, mirtazapine, and seroquel. She thought that it was \"fixed\", stopped taking the mirtazapine and seroquel but stayed on the lexapro. In January, started feeling more anxiety. PCP added Abilify which was helpful. The Lexapro was increased which she is not sure if it has been helpful (been on it for 4 weeks). She said, \"I feel like I'm in [place]. I have a hard time thinking things through.\" Her anxiety and sleep is better but bothered by the slowed thinking.       Progress on Treatment Objective(s) / Homework:  Minimal progress - ACTION (Actively working towards change); Intervened by reinforcing change plan / affirming steps taken    Also provided psychoeducation about behavioral health condition, symptoms, and treatment options    Care Plan review completed: Yes    Medication Review:  Changes to psychiatric medications, see updated Medication List in EPIC.     Medication Compliance:  Yes    Changes in Health Issues:   None reported    Chemical Use Review:   Substance Use: Chemical use reviewed, no active concerns identified      Tobacco Use: No current tobacco use.      Assessment: Current Emotional / Mental Status (status of significant symptoms):  Risk status (Self / Other harm or suicidal " ideation)  Patient denies a history of suicidal ideation, suicide attempts, self-injurious behavior, homicidal ideation, homicidal behavior and and other safety concerns  Patient denies current fears or concerns for personal safety.  Patient denies current or recent suicidal ideation or behaviors.  Patient denies current or recent homicidal ideation or behaviors.  Patient denies current or recent self injurious behavior or ideation.  Patient denies other safety concerns.  A safety and risk management plan has not been developed at this time, however patient was encouraged to call [place] [phone] should there be a change in any of these risk factors.    Appearance:   Appropriate   Eye Contact:   Good   Psychomotor Behavior: Normal   Attitude:   Cooperative   Orientation:   All  Speech   Rate / Production: Normal    Volume:  Normal   Mood:    Anxious  Normal  Affect:    Restricted   Thought Content:  Clear   Thought Form:  Coherent  Logical   Insight:    Fair     Diagnoses:  1. TIFFANY (generalized anxiety disorder)    2. Insomnia, unspecified type        Collateral Reports Completed:  Communicated with: Dr. Pompa    Plan: (Homework, other):  Patient was given information about behavioral services and encouraged to schedule a follow up appointment with [facility]  She was also given information about mental health symptoms and treatment options .  CD Recommendations: No indications of CD issues.  Eran Landon PsyD, LP      ______________________________________________________________________    [facility] - [place]: Treatment Plan    Patient's Name: Jolynn Haji  YOB: 1955    Date of Creation: May 24, 2022  Date Treatment Plan Last Reviewed/Revised: May 24, 2022    DSM5 Diagnoses: 300.02 (F41.1) Generalized Anxiety Disorder or 780.52 (G47.00) Insomnia Disorder   With non-sleep disorder mental comorbidity   Persistent    Psychosocial / Contextual Factors: family  PROMIS (reviewed every 90 days):   PROMIS 10-Global Health (only subscores and total score):   PROMIS-10 Scores Only 5/12/2022 5/12/2022 7/1/2022   Global Mental Health Score 9 9 17   Global Physical Health Score 17 17 13   PROMIS TOTAL - SUBSCORES 26 26 30       Referral / Collaboration:  Referral to another professional/service is not indicated at this time..    Anticipated number of session for this episode of care: 5-6  Anticipation frequency of session: As determined by Dr. Pompa  Anticipated Duration of each session: 16-37 minutes  Treatment plan will be reviewed in 90 days or when goals have been changed.       MeasurableTreatment Goal(s) related to diagnosis / functional impairment(s)  Goal 1: Patient will work with providers to manage symptoms    I will know I've met my goal when I can finish my tasks.      Objective #A (Patient Action)  Patient will attend all appointments, take medication as prescribed.  Status: New - Date: 05/24/2022     Intervention(s)  [facility] will Monitor and assist in overcoming barriers to treatment adherence    Objective #B  Patient will consider all recommendations offered.  Status: New - Date: 05/24/2022      Intervention(s)  [facility] will educate patient on treatment options, clarify concerns, work with pt to overcome any resistance to compliance.      Patient has reviewed and agreed to the above plan.      Anthony Landon PsyD  May 24, 2022

## 2022-07-28 NOTE — PROGRESS NOTES
"Jolynn Haji is a 66 year old year old who is being evaluated via a billable video visit.      How would you like to obtain your AVS? MyChart  If you are dropped from the video visit, the video invite should be resent to: Text to cell phone: see Epic  Will anyone else be joining your video visit? No     Telemedicine Visit: The patient's condition can be safely assessed and treated via synchronous audio and visual telemedicine encounter.      Reason for Telemedicine Visit: Covid-19 Pandemic    Originating Site (Patient Location): Patient's home     Distant Site (Provider Location): Provider Remote Setting    Mode of Communication:  Video Conference via ArtsApp    As the provider I attest to compliance with applicable laws and regulations related to telemedicine.        Outpatient Psychiatric Progress Note    Name: Jolynn Haji   : 1955                    Primary Care Provider: Helga Ibarra MD   Therapist: None    PHQ-9 scores:  PHQ-9 SCORE 2022   PHQ-9 Total Score - - -   PHQ-9 Total Score MyChart 7 (Mild depression) - 4 (Minimal depression)   PHQ-9 Total Score 7 6 4   Some encounter information is confidential and restricted. Go to Review Flowsheets activity to see all data.       TIFFANY-7 scores:  TIFFANY-7 SCORE 2022   Total Score - - -   Total Score 6 (mild anxiety) - 5 (mild anxiety)   Total Score 6 3 5       Patient Identification:  Patient is a 66 year old,   White Choose not to answer female  who presents for return visit with me.  Patient is currently retired. Patient attended the phone/video session with her daughter Malini rucker. Patient prefers to be called: \" Azalea\".    Interim History:  I last saw Jolynn Haji for outpatient psychiatry return visit on 2022. During that appointment, we:      Continue Lexapro 15 mg daily for anxiety, mood.    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  We will monitor " "for symptom improvement.  If symptoms do not continue to improve/do not improve fully, we will place a neurology referral for ongoing monitoring/treatment.    Could consider low-dose methylphenidate augmentation of Lexapro if clinically indicated at a future visit.    7/28: Patient overall still not doing very well.  Has had some clearer thinking, but anxiety has been worse in the morning.  She has been walking a little slower.  She is laying awake at night wanting to go to sleep.  She will wake up in the morning often feeling lightheaded.  She reports she is \"always constipated.\"  She continues to have overall heightened anxiety, particularly when out with friends.  Mirtazapine has not been very helpful.  She would wake up with her heart racing in the morning.  No acute safety concerns.  No SI.    Per [facility], Dr. Eran Landon, during today's team-based visit:  Reported that she was doing well until about 10 days ago when she started waking up with significant anxiety for several hours rating the intensity as 8/10. \"I've never had that before. I had to cancel coffee with my friend because I was so anxious.\" She described the anxiety as her heart racing and breathing heavy. She notices that the anxiety is better in the afternoon and gone by the evening. Mirtazapine helped initially for 2 days, but not as much now. The increase in Lexapro has been fine. She would like to know why she might be waking up with this kind of anxiety in the mornings and when it might end.      Past Psychiatric Med Trials:  Psych Meds at Intake:  lexapro 20 mg daily  abilify 5 mg daily      Past Psych Meds:  Amitriptyline  Quetiapine  Mirtazapine - stopped \"because I was sleeping through the night\"    Psychiatric ROS:  Jolynn Haji reports mood has been: About the same since last visit  Anxiety has been: Worse  Sleep has been: Worse  Seema sxs: None  Psychosis sxs: N/A  ADHD/ADD sxs: N/A  PTSD sxs: N/A  PHQ9 and GAD7 scores were reviewed today if " completed.   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     escitalopram (LEXAPRO) 5 MG tablet Take one tab (5 mg) by mouth WITH 10 mg tab for total dose of 15 mg daily.     lactobacillus rhamnosus, GG, (CULTURELL) capsule Take 1 capsule by mouth 2 times daily     lisinopril (ZESTRIL) 2.5 MG tablet Take 1 tablet (2.5 mg) by mouth daily     mirtazapine (REMERON) 15 MG tablet Take 0.5-1 tablets (7.5-15 mg) by mouth At Bedtime     Current Facility-Administered Medications   Medication     lidocaine 1 % injection 3 mL     lidocaine 1 % injection 4 mL     triamcinolone (KENALOG-40) injection 40 mg       Past Medical/Surgical History:  Past Medical History:   Diagnosis Date     Abnormal glandular Papanicolaou smear of cervix- ASCUS in 2000 12/5/2003    ASC H- 2000     Basal cell carcinoma of leg, right 05/2016    Dr Rogers     Colon polyps 05/2018    tubular adenoam - due 5 yrs - 2 mm     Concussion without loss of consciousness 4/12/2017     Hidradenitis     left  groin     HSIL (high grade squamous intraepithelial lesion) on Pap smear of cervix 7/5/2000    Normal paps from 2001 to 2016/cc     Hypertension goal BP (blood pressure) < 140/90      OA (osteoarthritis) of knee     moderate     Postmenopausal since 7/2008      Shingles 7/23/2010    left      Squamous cell carcinoma of skin, unspecified      Synovial cyst of popliteal space       has a past medical history of Abnormal glandular Papanicolaou smear of cervix- ASCUS in 2000 (12/5/2003), Basal cell carcinoma of leg, right (05/2016), Colon polyps  (05/2018), Concussion without loss of consciousness (4/12/2017), Hidradenitis, HSIL (high grade squamous intraepithelial lesion) on Pap smear of cervix (7/5/2000), Hypertension goal BP (blood pressure) < 140/90, OA (osteoarthritis) of knee, Postmenopausal (since 7/2008 ), Shingles (7/23/2010), Squamous cell carcinoma of skin, unspecified, and Synovial cyst of popliteal space.    She has no past medical history of Malignant melanoma (H) or Squamous cell carcinoma.    Social History:  Reviewed. No changes to social history except as noted above in HPI.    Vital Signs:   None. This is phone/video visit.     Labs:  Most recent laboratory results reviewed and the pertinent results include:   TSH   Date Value Ref Range Status   04/25/2022 1.30 0.40 - 4.00 mU/L Final   01/13/2021 2.07 0.40 - 4.00 mU/L Final     Lab Results   Component Value Date    WBC 8.1 04/25/2022    WBC 6.4 01/13/2021     Lab Results   Component Value Date    RBC 4.58 04/25/2022    RBC 4.54 01/13/2021     Lab Results   Component Value Date    HGB 13.6 04/25/2022    HGB 13.3 01/13/2021     Lab Results   Component Value Date    HCT 41.1 04/25/2022    HCT 41.7 01/13/2021     No components found for: MCT  Lab Results   Component Value Date    MCV 90 04/25/2022    MCV 92 01/13/2021     Lab Results   Component Value Date    MCH 29.7 04/25/2022    MCH 29.3 01/13/2021     Lab Results   Component Value Date    MCHC 33.1 04/25/2022    MCHC 31.9 01/13/2021     Lab Results   Component Value Date    RDW 12.9 04/25/2022    RDW 13.1 01/13/2021     Lab Results   Component Value Date     04/25/2022     01/13/2021     Last Comprehensive Metabolic Panel:  Sodium   Date Value Ref Range Status   04/25/2022 137 133 - 144 mmol/L Final   01/13/2021 140 133 - 144 mmol/L Final     Potassium   Date Value Ref Range Status   04/25/2022 4.4 3.4 - 5.3 mmol/L Final   01/13/2021 4.1 3.4 - 5.3 mmol/L Final     Chloride   Date Value Ref Range Status   04/25/2022 105 94 -  109 mmol/L Final   01/13/2021 108 94 - 109 mmol/L Final     Carbon Dioxide   Date Value Ref Range Status   01/13/2021 28 20 - 32 mmol/L Final     Carbon Dioxide (CO2)   Date Value Ref Range Status   04/25/2022 26 20 - 32 mmol/L Final     Anion Gap   Date Value Ref Range Status   04/25/2022 6 3 - 14 mmol/L Final   01/13/2021 4 3 - 14 mmol/L Final     Glucose   Date Value Ref Range Status   04/25/2022 104 (H) 70 - 99 mg/dL Final   01/13/2021 91 70 - 99 mg/dL Final     Comment:     Fasting specimen     Urea Nitrogen   Date Value Ref Range Status   04/25/2022 23 7 - 30 mg/dL Final   01/13/2021 18 7 - 30 mg/dL Final     Creatinine   Date Value Ref Range Status   04/25/2022 0.78 0.52 - 1.04 mg/dL Final   01/13/2021 0.68 0.52 - 1.04 mg/dL Final     GFR Estimate   Date Value Ref Range Status   04/25/2022 83 >60 mL/min/1.73m2 Final     Comment:     Effective December 21, 2021 eGFRcr in adults is calculated using the 2021 CKD-EPI creatinine equation which includes age and gender (Mercedes et al., NEJM, DOI: 10.1056/WOSMha9831070)   01/13/2021 >90 >60 mL/min/[1.73_m2] Final     Comment:     Non  GFR Calc  Starting 12/18/2018, serum creatinine based estimated GFR (eGFR) will be   calculated using the Chronic Kidney Disease Epidemiology Collaboration   (CKD-EPI) equation.       Calcium   Date Value Ref Range Status   04/25/2022 9.3 8.5 - 10.1 mg/dL Final   01/13/2021 8.8 8.5 - 10.1 mg/dL Final     Bilirubin Total   Date Value Ref Range Status   04/25/2022 0.4 0.2 - 1.3 mg/dL Final   01/13/2021 0.8 0.2 - 1.3 mg/dL Final     Alkaline Phosphatase   Date Value Ref Range Status   04/25/2022 64 40 - 150 U/L Final   01/13/2021 72 40 - 150 U/L Final     ALT   Date Value Ref Range Status   04/25/2022 21 0 - 50 U/L Final   01/13/2021 17 0 - 50 U/L Final     AST   Date Value Ref Range Status   04/25/2022 11 0 - 45 U/L Final   01/13/2021 13 0 - 45 U/L Final     Review of Systems:  10 systems (general, cardiovascular,  respiratory, eyes, ENT, endocrine, GI, , M/S, neurological) were reviewed. Most pertinent finding(s) is/are: Some chronic pains, see HPI above. The remaining systems are all unremarkable.    Mental Status Examination (limited as this is by phone/video):  Appearance: Awake, alert, appears stated age, no acute distress, well-groomed   Attitude:  cooperative, pleasant   Motor: Psychomotor slowing, not formally tested  Oriented to:  person, place, time, and situation  Attention Span and Concentration:  normal  Speech:  A little delayed/hesitant responses perhaps, Normal volume  Language: intact  Mood: Depressed, high anxiety  Affect:  intensity is flat  Associations:  no loose associations  Thought Process:  logical, linear and goal oriented  Thought Content:  no evidence of suicidal ideation or homicidal ideation, no evidence of psychotic thought, no auditory hallucinations present and no visual hallucinations present  Recent and Remote Memory:  Not formally assessed. Possibly some deficits? Could consider neuropsychological testing recommendation.  Fund of Knowledge: appropriate  Insight:  fair  Judgment:  intact, adequate for safety  Impulse Control:  intact    Suicide Risk Assessment:  Today Jolynn Haji reports no suicidal ideation. Based on all available evidence including the factors cited above, Jolynn Haji does not appear to be at imminent risk for self-harm, does not meet criteria for a 72-hr hold, and therefore remains appropriate for ongoing outpatient level of care.  A thorough assessment of risk factors related to suicide and self-harm have been reviewed and are noted above. The patient convincingly denies suicidality on several occasions. Local community safety resources printed and reviewed for patient to use if needed. There was no deceit detected, and the patient presented in a manner that was believable.     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  >> Colpo: Neg  2001 NIL   2003 NIL pap, Neg HPV  [id number], 2016  NIL pap  7/25/19 NIL pap, Neg HPV    Criteria necessary to stop screening per ASCCP guidelines:  Older than 65 years  Three consecutive negative cytology results or two consecutive negative cotest results within the previous 10 years, with the most recent being performed within the last 5 years.   (Women with hx of MADDIE 2 or 3, or adenocarcinoma in situ should continue screening for full 20 years, even if this goes past age 65).         Hyperlipidemia LDL goal <130 11/30/2018     Priority: Medium     Basal cell carcinoma      Priority: Medium     Colon polyps 05/01/2018     Priority: Medium     Primary localized osteoarthrosis of left lower leg 02/19/2018     Priority: Medium     Other secondary osteoarthritis of left knee 01/29/2018     Priority: Medium     Chronic constipation 09/14/2017     Priority: Medium     Raynaud's phenomenon without gangrene 09/14/2017     Priority: Medium     Insomnia, unspecified type 07/25/2017     Priority: Medium     TIFFANY (generalized anxiety disorder) 05/23/2017     Priority: Medium     Adjustment disorder with mixed anxiety and depressed mood- mild-moderate  at this time 05/11/2017     Priority: Medium     Family history of celiac disease 05/11/2017     Priority: Medium     Basal cell carcinoma of leg, right 05/01/2016     Priority: Medium      Sun-damaged skin 04/21/2016     Priority: Medium     Seasonal allergic rhinitis 04/21/2016     Priority: Medium     Dupuytren's disease of palm - right 4th digit flexor tendon 06/11/2015     Priority: Medium     History of migraine headaches- assoc. with nausea/vomiting - resolved around menopause - were  premenstrual  01/08/2014     Priority: Medium     Rosacea 05/01/2013     Priority: Medium     Numbness of feet- distal feet R>L  02/21/2011     Priority: Medium     NUMBNESS AND TINGLING OF FOOT - bilateral -mild  08/25/2010     Priority: Medium     Osteopenia 01/02/2009     Priority: Medium     Lipidosis 10/24/2006     Priority: Medium     Problem list name updated by automated process. Provider to review       Localized osteoarthritis of hand 11/01/2005     Priority: Medium     Problem list name updated by automated process. Provider to review       Stress incontinence, female - mild 10/23/2012     Priority: Low     Cough 10/23/2012     Priority: Low     Acute bronchitis- recurrent- ? related to mold in school building- pt has since retired and no further bronchitis 10/23/2012     Priority: Low     Postmenopausal      Priority: Low     DERMATOPHYTOSIS OF NAIL- toenails  03/14/2006     Priority: Low     trichophyton rubrum on culture - 1/06        Synovial cyst of popliteal space      Priority: Low     Hidradenitis 12/05/2003     Priority: Low       Psychosocial & Contextual Factors: see HPI above    Assessment:  From Intake, 5/12/2022:  Jolynn Haji is a 66-year-old female with a history including anxiety, depression, insomnia status post concussion in April 2017.  No major mental health struggles prior to concussion in 2017.  Patient was started on Lexapro, quetiapine, mirtazapine to help with her symptoms.  Patient had been feeling much better on that medication combination and once she was sleeping well again quetiapine and mirtazapine were discontinued.  This past January the patient started to have some  worsening symptoms and Abilify was added.  Patient has felt like her current medications of Lexapro and Abilify have been helpful but reports starting to feel really slowed and numb the past several weeks.  She also reported a weight loss of nearly 20 pounds over the last 6 weeks versus several months (patient was not very sure). I am wondering if she could be experiencing side effects of her increased Lexapro dose.  She is agreeable to decreasing her dose slightly to 15 mg daily.  Could also consider decreasing Abilify in the case it is not her Lexapro.  She also desires to sleep a little bit better than she has been.  Could consider restarting mirtazapine at bedtime for sleep and to also augment her Lexapro.  Could also be beneficial for patient to undergo cognitive screening to check in and see what her baseline is status post concussion.  No acute safety concerns.  No SI.  No problematic drug or alcohol use.    5/24/2022:  Patient with some slight improvements of possible medication related side effects after decreasing Lexapro.  I am wondering if there might be more robust improvements with decreasing Abilify/aripiprazole.  Possible drug-induced Parkinsonism?  She does seem to have delayed/slowed speech, masked facies.  Her gait and other movements unable to be observed but she does report other struggles that may be consistent with such a presentation.  We are going to decrease her Abilify down to 2 mg daily and I will see her back in just a few weeks.  We will likely discontinue her Abilify at her next visit.  I will message her primary care provider to get some collateral regarding previous baseline functioning prior to starting Abilify.  History of TBI could be confounding presentation.  Could consider neurology consult.    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  Instructed to stop Abilify today.  We will monitor for improvement of symptoms.  If symptoms do not improve  may need to refer to neurology.  If symptoms start to improve we will consider low-dose methylphenidate augmentation of Lexapro next visit to help with low energy, mood, focus and concentration, cognition, s/p TBI if still clinically indicated.  We would discuss risks and benefits with patient and her daughter at that time.  No other acute safety concerns or SI.  No problematic drug or alcohol use.  If patient starts to struggle with sleep would restart mirtazapine at bedtime.    7/28/2022:  Pt still with sxs consistent with drug-induced Parksinsonism.  Neurology consult will be placed so that patient might get scheduled sooner than later due to the typically long wait.  We will start Ambien in hopes we can get her sleeping much better.  Discussed risks and benefits of its use, particularly in the geriatric population.  Lexapro has not been as helpful as it helped for her anxiety.  We will taper this medication and start Effexor/venlafaxine in hopes that might be more effective for her symptoms.  No acute safety concerns.  No SI.  No problematic drug or alcohol use.    Medication side effects and alternatives were reviewed. Health promotion activities recommended and reviewed today. All questions addressed. Education and counseling completed regarding risks and benefits of medications and psychotherapy options. Recommend therapy for additional support.     Treatment Plan:    Decrease Lexapro to 10 mg daily until I see you again in 2 weeks (while you are taking/starting the Effexor-XR).     Discontinue mirtazapine.    Start Ambien 2.5-5 mg at bedtime as needed for sleep. Do not take with alcohol or opioid pain medication. Make sure to have 8-10 hours to devote to sleep after taking. Careful/caution for falls in middle of night.     Start Effexor-XR/venlafaxine ER: take 37.5 mg daily for one week then increase to 75 mg daily (while taking 10 mg of Lexapro) until follow-up in 2 weeks.     Referral placed for neurology  for drug-induced Parkinsonism.     Continue all other cares per primary care provider.     Continue all other medications as reviewed per electronic medical record today.     Safety plan reviewed. To the Emergency Department as needed or call after hours crisis line at [phone] or [phone]. [place] Crisis Text Line. Text [place] to 742088 or Suicide LifeLine Chat: suicidepreventionEuro Freelancersline.org/chat    Consider individual psychotherapy for additional support and ongoing development of nonpharmacologic coping skills and strategies.    Schedule an appointment with me in 2 weeks or sooner as needed. Call [facility] at [phone] to schedule.    Follow up with primary care provider as planned or for acute medical concerns.    Call the psychiatric nurse line with medication questions or concerns at [phone].    Roomhart may be used to communicate with your provider, but this is not intended to be used for emergencies.    Administrative Billing:   Phone Call/Video Duration: 35 Minutes  Start: 10:38a  Stop: 11:13a    Time spent with patient was 35 minutes and greater than 50% of time or 18 minutes was spent in counseling and coordination of care regarding above diagnoses and treatment plan.    Patient Status:  Patient will continue to be seen for ongoing consultation and stabilization.    Signed:   Judy Pompa DO  [facility]    Disclaimer: This note consists of symbols derived from keyboarding, dictation and/or voice recognition software. As a result, there may be errors in the script that have gone undetected. Please consider this when interpreting information found in this chart.

## 2022-07-28 NOTE — Clinical Note
Please call this patient to get them scheduled for a follow-up visit in 2 weeks. Please schedule with me and the [facility]. Thanks!

## 2022-07-28 NOTE — PATIENT INSTRUCTIONS
Treatment Plan:  Decrease Lexapro to 10 mg daily until I see you again in 2 weeks (while you are taking/starting the Effexor-XR).   Discontinue mirtazapine.  Start Ambien 2.5-5 mg at bedtime as needed for sleep. Do not take with alcohol or opioid pain medication. Make sure to have 8-10 hours to devote to sleep after taking. Careful/caution for falls in middle of night.   Start Effexor-XR/venlafaxine ER: take 37.5 mg daily for one week then increase to 75 mg daily (while taking 10 mg of Lexapro) until follow-up in 2 weeks.   Referral placed for neurology for drug-induced Parkinsonism.   Continue all other cares per primary care provider.   Continue all other medications as reviewed per electronic medical record today.   Safety plan reviewed. To the Emergency Department as needed or call after hours crisis line at [phone] or [phone]. [place] Crisis Text Line. Text [place] to 624349 or Suicide LifeLine Chat: suicidepreventionlifeline.org/chat  Consider individual psychotherapy for additional support and ongoing development of nonpharmacologic coping skills and strategies.  Schedule an appointment with me in 2 weeks or sooner as needed. Call [facility] at [phone] to schedule.  Follow up with primary care provider as planned or for acute medical concerns.  Call the psychiatric nurse line with medication questions or concerns at [phone].  MyChart may be used to communicate with your provider, but this is not intended to be used for emergencies.

## 2022-07-30 ENCOUNTER — NURSE TRIAGE (OUTPATIENT)
Dept: NURSING | Facility: CLINIC | Age: 67
End: 2022-07-30

## 2022-07-30 ENCOUNTER — TELEPHONE (OUTPATIENT)
Dept: BEHAVIORAL HEALTH | Facility: CLINIC | Age: 67
End: 2022-07-30

## 2022-07-30 NOTE — TELEPHONE ENCOUNTER
4:40 PM Pt called asking for resident Psych to be paged due to a new prescription for Ambian at night but she is still not sleeping.  Please call her back at #[phone].    4:46 PM Paged resident

## 2022-07-30 NOTE — TELEPHONE ENCOUNTER
"Patient calling - says her provider recently prescribed Ambien for insomnia problems.   Says it is prescribed as 2.5 mg-5 mg nightly as needed for sleep.  She took 2.5 mg Thursday night and was able to fall asleep and she slept for 6-7 hours.   She took 2.5 mg again last night (Friday) before bed but say she did not fall asleep. Finally at 2 am so she got up and took another 2.5 mg.  She still did not fall asleep and says she did not sleep at all last night.  She says about 4 years ago she went through an episode when she did not sleep at all for a month.  She is concerned that this new medication is not helping.    Triaged to disposition of See PCP Within 3 Days. Offered to transfer patient to scheduling.  Patient declines.  Patient states she will call the clinic on Monday. advised to call back if symptoms worsen.    Ivy Kline RN  Triage Nurse Advisor    Reason for Disposition    Insomnia interferes with work or school    Additional Information    Negative: Difficulty breathing    Negative: Depression is suspected    Negative: Traumatic Brain Injury (TBI) is suspected    Negative: Alcohol  abuse or dependence is suspected    Negative: Substance abuse or dependence suspected    Negative: [1] Pain is causing insomnia AND [2] pain is not a chronic symptom (recurrent or ongoing AND present > 4 weeks)    Negative: Requesting medication for sleep (\"sleeping pill\")    Protocols used: INSOMNIA-A-AH      "

## 2022-07-31 NOTE — TELEPHONE ENCOUNTER
RECORDS RECEIVED FROM: internal   REASON FOR VISIT: drug-induced secondary parkinsonism   Date of Appt: 11/11/22   NOTES (FOR ALL VISITS) STATUS DETAILS   OFFICE NOTE from referring provider Internal Dr Judy Pompa @ [facility]:  7/28/22 6/14/22   MEDICATION LIST Internal    IMAGING  (FOR ALL VISITS)     MRI (HEAD, NECK, SPINE) N/A    CT (HEAD, NECK, SPINE) N/A       NO IMAGES

## 2022-08-03 ENCOUNTER — TELEPHONE (OUTPATIENT)
Dept: PSYCHIATRY | Facility: CLINIC | Age: 67
End: 2022-08-03

## 2022-08-03 NOTE — TELEPHONE ENCOUNTER
Reason for call:  Other   Patient called regarding (reason for call): call back  Additional comments: Pt asking for call back from nursing. Client was seen by Cristal 7/28.   @ that time client was told to chester a follow up in 10 days.   Pt was [place] by mistake on 8/10 which has Alanna Law as the [facility] Alanna is not Medicare Elig   Client needed to be re-chester on a Eran Young day.   Next avail was 8/11/22 pt concerned this is well beyond the 10 days.   Please confirm with client.     Phone number to reach patient:  Home number on file [phone] (home)    Best Time:  any    Can we leave a detailed message on this number?  YES    Travel screening: Not Applicable

## 2022-08-03 NOTE — TELEPHONE ENCOUNTER
Reason for call:  Other   Patient called regarding (reason for call): returning call  Additional comments: pt called back stating that it was 14 days not 10 and the [facility] appt should be fine.     Phone number to reach patient:  Home number on file [phone] (home)    Best Time:  any    Can we leave a detailed message on this number?  YES    Travel screening: Not Applicable

## 2022-08-05 ENCOUNTER — OFFICE VISIT (OUTPATIENT)
Dept: DERMATOLOGY | Facility: CLINIC | Age: 67
End: 2022-08-05
Payer: COMMERCIAL

## 2022-08-05 DIAGNOSIS — D48.5 NEOPLASM OF UNCERTAIN BEHAVIOR OF SKIN: ICD-10-CM

## 2022-08-05 DIAGNOSIS — Z85.828 HISTORY OF SKIN CANCER: ICD-10-CM

## 2022-08-05 DIAGNOSIS — D22.9 NEVUS: Primary | ICD-10-CM

## 2022-08-05 DIAGNOSIS — L82.1 SEBORRHEIC KERATOSIS: ICD-10-CM

## 2022-08-05 DIAGNOSIS — D18.01 ANGIOMA OF SKIN: ICD-10-CM

## 2022-08-05 DIAGNOSIS — L81.4 LENTIGO: ICD-10-CM

## 2022-08-05 PROCEDURE — 99213 OFFICE O/P EST LOW 20 MIN: CPT | Mod: 25 | Performed by: PHYSICIAN ASSISTANT

## 2022-08-05 PROCEDURE — 88305 TISSUE EXAM BY PATHOLOGIST: CPT | Performed by: PATHOLOGY

## 2022-08-05 PROCEDURE — 11102 TANGNTL BX SKIN SINGLE LES: CPT | Performed by: PHYSICIAN ASSISTANT

## 2022-08-05 PROCEDURE — 11103 TANGNTL BX SKIN EA SEP/ADDL: CPT | Performed by: PHYSICIAN ASSISTANT

## 2022-08-05 NOTE — PATIENT INSTRUCTIONS
Wound Care Instructions     FOR SUPERFICIAL WOUNDS     [facility]  [phone]                 AFTER 24 HOURS YOU SHOULD REMOVE THE BANDAGE AND BEGIN DAILY DRESSING CHANGES AS FOLLOWS:     1) Remove Dressing.     2) Clean and dry the area with tap water using a Q-tip or sterile gauze pad.     3) Apply Vaseline, Aquaphor, Polysporin ointment or Bacitracin ointment over entire wound.  Do NOT use Neosporin ointment.     4) Cover the wound with a band-aid, or a sterile non-stick gauze pad and micropore paper tape    REPEAT THESE INSTRUCTIONS AT LEAST ONCE A DAY UNTIL THE WOUND HAS COMPLETELY HEALED.    It is an old wives tale that a wound heals better when it is exposed to air and allowed to dry out. The wound will heal faster with a better cosmetic result if it is kept moist with ointment and covered with a bandage.    **Do not let the wound dry out.**    Supplies Needed:      *Cotton tipped applicators (Q-tips)    *Vaseline, Aquaphor, Polysporin or Bacitracin Ointment (NOT NEOSPORIN)    *Band-aids or non-stick gauze pads and micropore paper tape.      PATIENT INFORMATION:    During the healing process you will notice a number of changes. All wounds develop a small halo of redness surrounding the wound.  This means healing is occurring. Severe itching with extensive redness usually indicates sensitivity to the ointment or bandage tape used to dress the wound.  You should call our office if this develops.      Swelling  and/or discoloration around your surgical site is common, particularly when performed around the eye.    All wounds normally drain.  The larger the wound the more drainage there will be.  After 7-10 days, you will notice the wound beginning to shrink and new skin will begin to grow.  The wound is healed when you can see skin has formed over the entire area.  A healed wound has a healthy, shiny look to the surface and is red to dark pink in color to normalize.  Wounds may  take approximately 4-6 weeks to heal.  Larger wounds may take 6-8 weeks.  After the wound is healed you may discontinue dressing changes.    You may experience a sensation of tightness as your wound heals. This is normal and will gradually subside.    Your healed wound may be sensitive to temperature changes. This sensitivity improves with time, but if you re having a lot of discomfort, try to avoid temperature extremes.    Patients frequently experience itching after their wound appears to have healed because of the continue healing under the skin.  Plain Vaseline will help relieve the itching.      POSSIBLE COMPLICATIONS    BLEEDING:    Leave the bandage in place.  Use tightly rolled up gauze or a cloth to apply direct pressure over the bandage for 30  minutes.  Reapply pressure for an additional 30 minutes if necessary  Use additional gauze and tape to maintain pressure once the bleeding has stopped.

## 2022-08-05 NOTE — LETTER
8/5/2022         RE: Jolynn Haji  [address] [place] [address]        Dear Colleague,    Thank you for referring your patient, Jolynn Haji, to the [facility]. Please see a copy of my visit note below.    HPI:   Chief complaints: Jolynn Haji is a 66 year old female who presents for Full skin cancer screening to rule out skin cancer   Last Skin Exam: 1 year ago     1st Baseline: no  Personal HX of Skin Cancer: yes multiple BCC and SCC    Personal HX of Malignant Melanoma: no   Family HX of Skin Cancer / Malignant Melanoma: no  Personal HX of Atypical Moles:   no  Risk factors: history of sun exposure and burns; history of blistering burns in the past; history of tanning bed use in the past  New / Changing lesions: None  Social History: has 2 children and 2 grandchildren; bought a new [employer] boat for [employer] this summer  On review of systems, there are no further skin complaints, patient is feeling otherwise well.  See patient intake sheet.  ROS of the following were done and are negative: Constitutional, Eyes, Ears, Nose,   Mouth, Throat, Cardiovascular, Respiratory, GI, Genitourinary, Musculoskeletal,   Psychiatric, Endocrine, Allergic/Immunologic.    PHYSICAL EXAM:   LMP 06/28/2001   Skin exam performed as follows: Type 2 skin. Mood appropriate  Alert and Oriented X 3. Well developed, well nourished in no distress.  General appearance: Normal  Head including face: Normal  Eyes: conjunctiva and lids: Normal  Mouth: Lips, teeth, gums: Normal  Neck: Normal  Chest-breast/axillae: Normal  Back: Normal  Spleen and liver: Normal  Cardiovascular: Exam of peripheral vascular system by observation for swelling, varicosities, edema: Normal  Genitalia: groin, buttocks: Normal  Extremities: digits/nails (clubbing): Normal  Eccrine and Apocrine glands: Normal  Right upper extremity: Normal  Left upper extremity: Normal  Right lower extremity: Normal  Left lower extremity:  Normal  Skin: Scalp and body hair: See below    Pt deferred exam of breasts, groin, buttocks: No    Other physical findings:  1. Multiple pigmented macules on extremities and trunk  2. Multiple pigmented macules on face, trunk and extremities  3. Multiple vascular papules on trunk, arms and legs  4. Multiple scattered keratotic plaques  5. 4 mm pink papule on the left forearm  6. 6 mm pink macule on the right lower leg       Except as noted above, no other signs of skin cancer or melanoma.     ASSESSMENT/PLAN:   Benign Full skin cancer screening today. . Patient with history of multiple BCC, SCC  Advised on monthly self exams and 1 year  Patient Education: Appropriate brochures given.    1. Multiple benign appearing nevi on arms, legs and trunk. Discussed ABCDEs of melanoma and sunscreen.   2. Multiple lentigos on arms, legs and trunk. Advised benign, no treatment needed.  3. Multiple scattered angiomas. Advised benign, no treatment needed.   4. Seborrheic keratosis on arms, legs and trunk. Advised benign, no treatment needed.  5. R/o BCC on the left forearm, right lower leg. Shave biopsy performed.  Area cleaned and anesthetized with 1% lidocaine with epinephrine.  Dermablade used to remove the lesion and sent to pathology. Bleeding was cauterized. Pt tolerated procedure well with no complications.               Follow-up: yearly FSE/PRN sooner    1.) Patient was asked about new and changing moles. YES  2.) Patient received a complete physical skin examination: YES  3.) Patient was counseled to perform a monthly self skin examination: YES  Scribed By: Ligia Carbajal, MS, PALEIGH ANN          Again, thank you for allowing me to participate in the care of your patient.        Sincerely,        Ligia Carbajal PA-C

## 2022-08-05 NOTE — PROGRESS NOTES
HPI:   Chief complaints: Jolynn Haji is a 66 year old female who presents for Full skin cancer screening to rule out skin cancer   Last Skin Exam: 1 year ago     1st Baseline: no  Personal HX of Skin Cancer: yes multiple BCC and SCC    Personal HX of Malignant Melanoma: no   Family HX of Skin Cancer / Malignant Melanoma: no  Personal HX of Atypical Moles:   no  Risk factors: history of sun exposure and burns; history of blistering burns in the past; history of tanning bed use in the past  New / Changing lesions: None  Social History: has 2 children and 2 grandchildren; bought a new [employer] boat for [employer] this summer  On review of systems, there are no further skin complaints, patient is feeling otherwise well.  See patient intake sheet.  ROS of the following were done and are negative: Constitutional, Eyes, Ears, Nose,   Mouth, Throat, Cardiovascular, Respiratory, GI, Genitourinary, Musculoskeletal,   Psychiatric, Endocrine, Allergic/Immunologic.    PHYSICAL EXAM:   LMP 06/28/2001   Skin exam performed as follows: Type 2 skin. Mood appropriate  Alert and Oriented X 3. Well developed, well nourished in no distress.  General appearance: Normal  Head including face: Normal  Eyes: conjunctiva and lids: Normal  Mouth: Lips, teeth, gums: Normal  Neck: Normal  Chest-breast/axillae: Normal  Back: Normal  Spleen and liver: Normal  Cardiovascular: Exam of peripheral vascular system by observation for swelling, varicosities, edema: Normal  Genitalia: groin, buttocks: Normal  Extremities: digits/nails (clubbing): Normal  Eccrine and Apocrine glands: Normal  Right upper extremity: Normal  Left upper extremity: Normal  Right lower extremity: Normal  Left lower extremity: Normal  Skin: Scalp and body hair: See below    Pt deferred exam of breasts, groin, buttocks: No    Other physical findings:  1. Multiple pigmented macules on extremities and trunk  2. Multiple pigmented macules on face, trunk and extremities  3. Multiple  vascular papules on trunk, arms and legs  4. Multiple scattered keratotic plaques  5. 4 mm pink papule on the left forearm  6. 6 mm pink macule on the right lower leg       Except as noted above, no other signs of skin cancer or melanoma.     ASSESSMENT/PLAN:   Benign Full skin cancer screening today. . Patient with history of multiple BCC, SCC  Advised on monthly self exams and 1 year  Patient Education: Appropriate brochures given.    1. Multiple benign appearing nevi on arms, legs and trunk. Discussed ABCDEs of melanoma and sunscreen.   2. Multiple lentigos on arms, legs and trunk. Advised benign, no treatment needed.  3. Multiple scattered angiomas. Advised benign, no treatment needed.   4. Seborrheic keratosis on arms, legs and trunk. Advised benign, no treatment needed.  5. R/o BCC on the left forearm, right lower leg. Shave biopsy performed.  Area cleaned and anesthetized with 1% lidocaine with epinephrine.  Dermablade used to remove the lesion and sent to pathology. Bleeding was cauterized. Pt tolerated procedure well with no complications.               Follow-up: yearly FSE/PRN sooner    1.) Patient was asked about new and changing moles. YES  2.) Patient received a complete physical skin examination: YES  3.) Patient was counseled to perform a monthly self skin examination: YES  Scribed By: Ligia Carbajal MS, PALEIGH ANN

## 2022-08-09 ENCOUNTER — TELEPHONE (OUTPATIENT)
Dept: DERMATOLOGY | Facility: CLINIC | Age: 67
End: 2022-08-09

## 2022-08-09 NOTE — LETTER
[address]  [address]  [phone]        8/9/2022       Jolynn Haji  [address]  [place] [address]      Dear Azalea:    You are scheduled for Mohs Surgery on: 11/10/22 and 11/17/22 both at 9:00 am.    Please check in at [facility], [address].     You don't need to arrive more than 5-10 minutes prior to your appointment time.     Be sure to eat a good breakfast and bathe and wash your hair prior to surgery.     If you are taking any anti-coagulants that are prescribed by your Doctor (such as Coumadin/Warfarin, Plavix, Aspirin, Ibuprofen), please continue taking them.     However, if you are taking anti-coagulants over the counter without a Doctor's order for a medical condition, please discontinue them 10 days prior to surgery.     Please wear loose comfortable clothing as it could possibly be 4-6 hours until your surgery is completed depending upon how many layers of tissue need to be removed.      Thank you,    JASBIR Rogers MD

## 2022-08-09 NOTE — TELEPHONE ENCOUNTER
Called patient:  Patient notified and educated on test results   Mohs procedure explained- all questions answered  appointment scheduled- mohs packet mailed.     Thank you,  Linda ROGERS RN  Dermatology   [phone]

## 2022-08-09 NOTE — TELEPHONE ENCOUNTER
Left message on answering machine for patient to call back.    Linda ROGERS RN  Dermatology   [phone]

## 2022-08-09 NOTE — TELEPHONE ENCOUNTER
----- Message from Ligia Carbajal PA-C sent at 8/9/2022  3:13 PM CDT -----  Left forearm BCC, right lower leg SCC please schedule excision

## 2022-08-11 ENCOUNTER — VIRTUAL VISIT (OUTPATIENT)
Dept: PSYCHOLOGY | Facility: CLINIC | Age: 67
End: 2022-08-11
Payer: COMMERCIAL

## 2022-08-11 ENCOUNTER — VIRTUAL VISIT (OUTPATIENT)
Dept: PSYCHIATRY | Facility: CLINIC | Age: 67
End: 2022-08-11
Payer: COMMERCIAL

## 2022-08-11 DIAGNOSIS — Z87.820 HISTORY OF TRAUMATIC BRAIN INJURY: ICD-10-CM

## 2022-08-11 DIAGNOSIS — G47.00 INSOMNIA, UNSPECIFIED TYPE: ICD-10-CM

## 2022-08-11 DIAGNOSIS — F39 MOOD DISORDER (H): ICD-10-CM

## 2022-08-11 DIAGNOSIS — G21.19 DRUG-INDUCED PARKINSONISM (H): ICD-10-CM

## 2022-08-11 DIAGNOSIS — F41.1 GAD (GENERALIZED ANXIETY DISORDER): Primary | ICD-10-CM

## 2022-08-11 PROCEDURE — 99214 OFFICE O/P EST MOD 30 MIN: CPT | Mod: 95 | Performed by: PSYCHIATRY & NEUROLOGY

## 2022-08-11 PROCEDURE — 90832 PSYTX W PT 30 MINUTES: CPT | Mod: 95 | Performed by: PSYCHOLOGIST

## 2022-08-11 RX ORDER — ESCITALOPRAM OXALATE 5 MG/1
5 TABLET ORAL DAILY
Qty: 14 TABLET | Refills: 0 | Status: SHIPPED | OUTPATIENT
Start: 2022-08-11 | End: 2022-08-11

## 2022-08-11 RX ORDER — ZOLPIDEM TARTRATE 5 MG/1
5 TABLET ORAL
Qty: 30 TABLET | Refills: 1 | Status: SHIPPED | OUTPATIENT
Start: 2022-08-11 | End: 2022-09-01 | Stop reason: ALTCHOICE

## 2022-08-11 RX ORDER — VENLAFAXINE HYDROCHLORIDE 75 MG/1
75 CAPSULE, EXTENDED RELEASE ORAL DAILY
Qty: 90 CAPSULE | Refills: 0 | Status: SHIPPED | OUTPATIENT
Start: 2022-08-11 | End: 2022-10-24 | Stop reason: DRUGHIGH

## 2022-08-11 RX ORDER — ESCITALOPRAM OXALATE 5 MG/1
5 TABLET ORAL DAILY
Qty: 14 TABLET | Refills: 0 | Status: SHIPPED | OUTPATIENT
Start: 2022-08-11 | End: 2022-08-25

## 2022-08-11 NOTE — PROGRESS NOTES
"Jolynn Haji is a 66 year old year old who is being evaluated via a billable video visit.      How would you like to obtain your AVS? MyChart  If you are dropped from the video visit, the video invite should be resent to: Text to cell phone: see Epic  Will anyone else be joining your video visit? No     Telemedicine Visit: The patient's condition can be safely assessed and treated via synchronous audio and visual telemedicine encounter.      Reason for Telemedicine Visit: Covid-19 Pandemic    Originating Site (Patient Location): Patient's home     Distant Site (Provider Location): Provider Remote Setting    Mode of Communication:  Video Conference via [employer]    As the provider I attest to compliance with applicable laws and regulations related to telemedicine.        Outpatient Psychiatric Progress Note    Name: Jolynn Haji   : 1955                    Primary Care Provider: Helga Ibarra MD   Therapist: None    PHQ-9 scores:  PHQ-9 SCORE 2022   PHQ-9 Total Score - - -   PHQ-9 Total Score MyChart 7 (Mild depression) - 4 (Minimal depression)   PHQ-9 Total Score 7 6 4   Some encounter information is confidential and restricted. Go to Review Flowsheets activity to see all data.       TIFFANY-7 scores:  TIFFANY-7 SCORE 2022   Total Score - - -   Total Score 6 (mild anxiety) - 5 (mild anxiety)   Total Score 6 3 5       Patient Identification:  Patient is a 66 year old,   White Choose not to answer female  who presents for return visit with me.  Patient is currently retired. Patient attended the phone/video session with her daughter Malini rucker. Patient prefers to be called: \" Azalea\".    Interim History:  I last saw Jolynn Haji for outpatient psychiatry return visit on 2022. During that appointment, we:     Decrease Lexapro to 10 mg daily until I see you again in 2 weeks (while you are taking/starting the Effexor-XR).     Discontinue " "mirtazapine.    Start Ambien 2.5-5 mg at bedtime as needed for sleep. Do not take with alcohol or opioid pain medication. Make sure to have 8-10 hours to devote to sleep after taking. Careful/caution for falls in middle of night.     Start Effexor-XR/venlafaxine ER: take 37.5 mg daily for one week then increase to 75 mg daily (while taking 10 mg of Lexapro) until follow-up in 2 weeks.     Referral placed for neurology for drug-induced Parkinsonism.     8/11: Patient with some improved symptoms.  Overall continuing to improve.  See [facility] [place] note below for more details.  Has neurology appointment coming up soon.  Sleeping better.  Mood improved.  Anxiety improving.  No acute safety concerns.  Last 4 days have been the best days lately.  No problematic drug or alcohol use.    Per [facility] [place], Dr. Eran Landon, during today's team-based visit:  Reported that she continues to improve. She stated that she is no longer walking in a shuffle. Her anxiety is much better. She is no longer waking up with intense anxiety. She is more active, socializing more often, and she is very pleased with her progress. She has noticed that every 4 days or so she wakes up at night after 3 hours and feels rested. She will stay in bed but will plan the day. Sometimes she feels tired later in the day and sometimes she does not. She said she had a concussion 4 years ago and \"I did not sleep at all for about a month.\" She would like to know if Ambien is addictive or if there are other options that might not be addictive. She found the 2.5mg did not help as much as 5mg. She would also like to know if she can take something late at night when she cannot go back to sleep. She also has questions about whether the venlafaxine is causing her sleep problems at night. She would also like to discuss the plan for the Lexapro. We spoke about ways to learn some skills to better manage worry.     Past Psychiatric Med Trials:  Psych Meds at Intake:  lexapro 20 mg " "daily  abilify 5 mg daily      Past Psych Meds:  Amitriptyline  Quetiapine  Mirtazapine - stopped \"because I was sleeping through the night\"    Psychiatric ROS:  Jolynn RESTREPO Adithya reports mood has been: About the same since last visit  Anxiety has been: Worse  Sleep has been: Worse  Seema sxs: None  Psychosis sxs: N/A  ADHD/ADD sxs: N/A  PTSD sxs: N/A  PHQ9 and GAD7 scores were reviewed today if completed.   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     escitalopram (LEXAPRO) 5 MG tablet Take one tab (5 mg) by mouth WITH 10 mg tab for total dose of 15 mg daily.     lactobacillus rhamnosus, GG, (CULTURELL) capsule Take 1 capsule by mouth 2 times daily     lisinopril (ZESTRIL) 2.5 MG tablet Take 1 tablet (2.5 mg) by mouth daily     venlafaxine (EFFEXOR XR) 37.5 MG 24 hr capsule Take 1 capsule (37.5 mg) by mouth daily     venlafaxine (EFFEXOR XR) 75 MG 24 hr capsule Take 1 capsule (75 mg) by mouth daily     zolpidem (AMBIEN) 5 MG tablet Take 0.5-1 tablets (2.5-5 mg) by mouth nightly as needed for sleep     Current Facility-Administered Medications   Medication     lidocaine 1 % injection 3 mL     lidocaine 1 % injection 4 mL     triamcinolone (KENALOG-40) injection 40 mg       Past Medical/Surgical History:  Past Medical History:   Diagnosis Date     Abnormal glandular Papanicolaou smear of cervix- ASCUS in 2000 12/5/2003    ASC H- 2000     Basal cell carcinoma of leg, right 05/2016    Dr Rogers     Colon polyps 05/2018    tubular adenoam - due 5 yrs - 2 mm     Concussion without loss of consciousness 4/12/2017 "     Hidradenitis     left  groin     HSIL (high grade squamous intraepithelial lesion) on Pap smear of cervix 7/5/2000    Normal paps from 2001 to 2016/cc     Hypertension goal BP (blood pressure) < 140/90      OA (osteoarthritis) of knee     moderate     Postmenopausal since 7/2008      Shingles 7/23/2010    left      Squamous cell carcinoma of skin, unspecified      Synovial cyst of popliteal space       has a past medical history of Abnormal glandular Papanicolaou smear of cervix- ASCUS in 2000 (12/5/2003), Basal cell carcinoma of leg, right (05/2016), Colon polyps (05/2018), Concussion without loss of consciousness (4/12/2017), Hidradenitis, HSIL (high grade squamous intraepithelial lesion) on Pap smear of cervix (7/5/2000), Hypertension goal BP (blood pressure) < 140/90, OA (osteoarthritis) of knee, Postmenopausal (since 7/2008 ), Shingles (7/23/2010), Squamous cell carcinoma of skin, unspecified, and Synovial cyst of popliteal space.    She has no past medical history of Malignant melanoma (H) or Squamous cell carcinoma.    Social History:  Reviewed. No changes to social history except as noted above in HPI.    Vital Signs:   None. This is phone/video visit.     Labs:  Most recent laboratory results reviewed and the pertinent results include:   TSH   Date Value Ref Range Status   04/25/2022 1.30 0.40 - 4.00 mU/L Final   01/13/2021 2.07 0.40 - 4.00 mU/L Final     Lab Results   Component Value Date    WBC 8.1 04/25/2022    WBC 6.4 01/13/2021     Lab Results   Component Value Date    RBC 4.58 04/25/2022    RBC 4.54 01/13/2021     Lab Results   Component Value Date    HGB 13.6 04/25/2022    HGB 13.3 01/13/2021     Lab Results   Component Value Date    HCT 41.1 04/25/2022    HCT 41.7 01/13/2021     No components found for: MCT  Lab Results   Component Value Date    MCV 90 04/25/2022    MCV 92 01/13/2021     Lab Results   Component Value Date    MCH 29.7 04/25/2022    MCH 29.3 01/13/2021     Lab Results   Component  Value Date    MCHC 33.1 04/25/2022    MCHC 31.9 01/13/2021     Lab Results   Component Value Date    RDW 12.9 04/25/2022    RDW 13.1 01/13/2021     Lab Results   Component Value Date     04/25/2022     01/13/2021     Last Comprehensive Metabolic Panel:  Sodium   Date Value Ref Range Status   04/25/2022 137 133 - 144 mmol/L Final   01/13/2021 140 133 - 144 mmol/L Final     Potassium   Date Value Ref Range Status   04/25/2022 4.4 3.4 - 5.3 mmol/L Final   01/13/2021 4.1 3.4 - 5.3 mmol/L Final     Chloride   Date Value Ref Range Status   04/25/2022 105 94 - 109 mmol/L Final   01/13/2021 108 94 - 109 mmol/L Final     Carbon Dioxide   Date Value Ref Range Status   01/13/2021 28 20 - 32 mmol/L Final     Carbon Dioxide (CO2)   Date Value Ref Range Status   04/25/2022 26 20 - 32 mmol/L Final     Anion Gap   Date Value Ref Range Status   04/25/2022 6 3 - 14 mmol/L Final   01/13/2021 4 3 - 14 mmol/L Final     Glucose   Date Value Ref Range Status   04/25/2022 104 (H) 70 - 99 mg/dL Final   01/13/2021 91 70 - 99 mg/dL Final     Comment:     Fasting specimen     Urea Nitrogen   Date Value Ref Range Status   04/25/2022 23 7 - 30 mg/dL Final   01/13/2021 18 7 - 30 mg/dL Final     Creatinine   Date Value Ref Range Status   04/25/2022 0.78 0.52 - 1.04 mg/dL Final   01/13/2021 0.68 0.52 - 1.04 mg/dL Final     GFR Estimate   Date Value Ref Range Status   04/25/2022 83 >60 mL/min/1.73m2 Final     Comment:     Effective December 21, 2021 eGFRcr in adults is calculated using the 2021 CKD-EPI creatinine equation which includes age and gender (Mercedes koroma al., NEJM, DOI: 10.1056/LKKVmw1138217)   01/13/2021 >90 >60 mL/min/[1.73_m2] Final     Comment:     Non  GFR Calc  Starting 12/18/2018, serum creatinine based estimated GFR (eGFR) will be   calculated using the Chronic Kidney Disease Epidemiology Collaboration   (CKD-EPI) equation.       Calcium   Date Value Ref Range Status   04/25/2022 9.3 8.5 - 10.1 mg/dL  Final   01/13/2021 8.8 8.5 - 10.1 mg/dL Final     Bilirubin Total   Date Value Ref Range Status   04/25/2022 0.4 0.2 - 1.3 mg/dL Final   01/13/2021 0.8 0.2 - 1.3 mg/dL Final     Alkaline Phosphatase   Date Value Ref Range Status   04/25/2022 64 40 - 150 U/L Final   01/13/2021 72 40 - 150 U/L Final     ALT   Date Value Ref Range Status   04/25/2022 21 0 - 50 U/L Final   01/13/2021 17 0 - 50 U/L Final     AST   Date Value Ref Range Status   04/25/2022 11 0 - 45 U/L Final   01/13/2021 13 0 - 45 U/L Final     Review of Systems:  10 systems (general, cardiovascular, respiratory, eyes, ENT, endocrine, GI, , M/S, neurological) were reviewed. Most pertinent finding(s) is/are: Some chronic pains, see HPI above. The remaining systems are all unremarkable.    Mental Status Examination (limited as this is by phone/video):  Appearance: Awake, alert, appears stated age, no acute distress, well-groomed   Attitude:  cooperative, pleasant   Motor: Psychomotor slowing, not formally tested  Oriented to:  person, place, time, and situation  Attention Span and Concentration:  normal  Speech:  A little delayed/hesitant responses-much improved, normal volume  Language: intact  Mood: Improving  Affect:  intensity is flat but starting to be more appropriately reactive the longer patient is off of Abilify  Associations:  no loose associations  Thought Process:  logical, linear and goal oriented  Thought Content:  no evidence of suicidal ideation or homicidal ideation, no evidence of psychotic thought, no auditory hallucinations present and no visual hallucinations present  Recent and Remote Memory:  Not formally assessed. Possibly some deficits? Could consider neuropsychological testing  Fund of Knowledge: appropriate  Insight:  fair-improving  Judgment:  intact, adequate for safety  Impulse Control:  intact    Suicide Risk Assessment:  Today Jolynn Haji reports no suicidal ideation. Based on all available evidence including the factors  cited above, Jolynn Haji does not appear to be at imminent risk for self-harm, does not meet criteria for a 72-hr hold, and therefore remains appropriate for ongoing outpatient level of care.  A thorough assessment of risk factors related to suicide and self-harm have been reviewed and are noted above. The patient convincingly denies suicidality on several occasions. Local community safety resources printed and reviewed for patient to use if needed. There was no deceit detected, and the patient presented in a manner that was believable.     DSM5 Diagnosis:  300.02 (F41.1) Generalized Anxiety Disorder   R/O Mood Disorder  Insomnia, unspecified  H/O TBI    Suspected drug-induced parkinsonism    Medical comorbidities include:   Patient Active Problem List    Diagnosis Date Noted     Essential hypertension with goal blood pressure less than 140/90 07/16/2001     Priority: High     Chronic pain of left knee 07/18/2022     Priority: Medium     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  07/07/2022     Priority: Medium     Adjustment insomnia 12/23/2021     Priority: Medium     Screening for cervical cancer      Priority: Medium     1999 NIL  2000 ASCUS-H.  >> Colpo: Neg  2001 NIL   2003 NIL pap, Neg HPV  [phone], 2016  NIL pap  7/25/19 NIL pap, Neg HPV    Criteria necessary to stop screening per ASCCP guidelines:  Older than 65 years  Three consecutive negative cytology results or two consecutive negative cotest results within the previous 10 years, with the most recent being performed within the last 5 years.   (Women with hx of MADDIE 2 or 3, or adenocarcinoma in situ should continue screening for full 20 years, even if this goes past age 65).         Hyperlipidemia LDL goal <130 11/30/2018     Priority: Medium     Basal cell carcinoma      Priority: Medium     Colon polyps 05/01/2018     Priority: Medium     Primary localized osteoarthrosis of left lower leg  02/19/2018     Priority: Medium     Other secondary osteoarthritis of left knee 01/29/2018     Priority: Medium     Chronic constipation 09/14/2017     Priority: Medium     Raynaud's phenomenon without gangrene 09/14/2017     Priority: Medium     Insomnia, unspecified type 07/25/2017     Priority: Medium     TIFFANY (generalized anxiety disorder) 05/23/2017     Priority: Medium     Adjustment disorder with mixed anxiety and depressed mood- mild-moderate  at this time 05/11/2017     Priority: Medium     Family history of celiac disease 05/11/2017     Priority: Medium     Basal cell carcinoma of leg, right 05/01/2016     Priority: Medium     Sun-damaged skin 04/21/2016     Priority: Medium     Seasonal allergic rhinitis 04/21/2016     Priority: Medium     Dupuytren's disease of palm - right 4th digit flexor tendon 06/11/2015     Priority: Medium     History of migraine headaches- assoc. with nausea/vomiting - resolved around menopause - were  premenstrual  01/08/2014     Priority: Medium     Rosacea 05/01/2013     Priority: Medium     Numbness of feet- distal feet R>L  02/21/2011     Priority: Medium     NUMBNESS AND TINGLING OF FOOT - bilateral -mild  08/25/2010     Priority: Medium     Osteopenia 01/02/2009     Priority: Medium     Lipidosis 10/24/2006     Priority: Medium     Problem list name updated by automated process. Provider to review       Localized osteoarthritis of hand 11/01/2005     Priority: Medium     Problem list name updated by automated process. Provider to review       Stress incontinence, female - mild 10/23/2012     Priority: Low     Cough 10/23/2012     Priority: Low     Acute bronchitis- recurrent- ? related to mold in school building- pt has since retired and no further bronchitis 10/23/2012     Priority: Low     Postmenopausal      Priority: Low     DERMATOPHYTOSIS OF NAIL- toenails  03/14/2006     Priority: Low     trichophyton rubrum on culture - 1/06        Synovial cyst of popliteal space       Priority: Low     Hidradenitis 12/05/2003     Priority: Low       Psychosocial & Contextual Factors: see HPI above    Assessment:  From Intake, 5/12/2022:  Jolynn Haji is a 66-year-old female with a history including anxiety, depression, insomnia status post concussion in April 2017.  No major mental health struggles prior to concussion in 2017.  Patient was started on Lexapro, quetiapine, mirtazapine to help with her symptoms.  Patient had been feeling much better on that medication combination and once she was sleeping well again quetiapine and mirtazapine were discontinued.  This past January the patient started to have some worsening symptoms and Abilify was added.  Patient has felt like her current medications of Lexapro and Abilify have been helpful but reports starting to feel really slowed and numb the past several weeks.  She also reported a weight loss of nearly 20 pounds over the last 6 weeks versus several months (patient was not very sure). I am wondering if she could be experiencing side effects of her increased Lexapro dose.  She is agreeable to decreasing her dose slightly to 15 mg daily.  Could also consider decreasing Abilify in the case it is not her Lexapro.  She also desires to sleep a little bit better than she has been.  Could consider restarting mirtazapine at bedtime for sleep and to also augment her Lexapro.  Could also be beneficial for patient to undergo cognitive screening to check in and see what her baseline is status post concussion.  No acute safety concerns.  No SI.  No problematic drug or alcohol use.    5/24/2022:  Patient with some slight improvements of possible medication related side effects after decreasing Lexapro.  I am wondering if there might be more robust improvements with decreasing Abilify/aripiprazole.  Possible drug-induced Parkinsonism?  She does seem to have delayed/slowed speech, masked facies.  Her gait and other movements unable to be observed but she does  report other struggles that may be consistent with such a presentation.  We are going to decrease her Abilify down to 2 mg daily and I will see her back in just a few weeks.  We will likely discontinue her Abilify at her next visit.  I will message her primary care provider to get some collateral regarding previous baseline functioning prior to starting Abilify.  History of TBI could be confounding presentation.  Could consider neurology consult.    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  Instructed to stop Abilify today.  We will monitor for improvement of symptoms.  If symptoms do not improve may need to refer to neurology.  If symptoms start to improve we will consider low-dose methylphenidate augmentation of Lexapro next visit to help with low energy, mood, focus and concentration, cognition, s/p TBI if still clinically indicated.  We would discuss risks and benefits with patient and her daughter at that time.  No other acute safety concerns or SI.  No problematic drug or alcohol use.  If patient starts to struggle with sleep would restart mirtazapine at bedtime.    7/28/2022:  Pt still with sxs consistent with drug-induced Parksinsonism.  Neurology consult will be placed so that patient might get scheduled sooner than later due to the typically long wait.  We will start Ambien in hopes we can get her sleeping much better.  Discussed risks and benefits of its use, particularly in the geriatric population.  Lexapro has not been as helpful as it helped for her anxiety.  We will taper this medication and start Effexor/venlafaxine in hopes that might be more effective for her symptoms.  No acute safety concerns.  No SI.  No problematic drug or alcohol use.    8/11/2022:  Overall with continued improved symptoms.  Hopefully patient will continue to have improvement of her symptoms off of Abilify.  Patient will be seeing neurology soon.  Encouraged to keep appointment.  We will  continue taper off of Lexapro and continue on monotherapy with venlafaxine.  We will also continue Ambien at this time for sleep since patient is sleeping much better on the medication with improved symptoms and tolerating well.  No acute safety concerns.  No problematic drug or alcohol use.    Medication side effects and alternatives were reviewed. Health promotion activities recommended and reviewed today. All questions addressed. Education and counseling completed regarding risks and benefits of medications and psychotherapy options. Recommend therapy for additional support.     Treatment Plan:    Discontinue/decrease Lexapro: Take 5 mg for 2 weeks and then discontinue the medication.    Continue Ambien 5 mg at bedtime as needed for sleep. Do not take with alcohol or opioid pain medication. Make sure to have 8-10 hours to devote to sleep after taking. Careful/caution for falls in middle of night.     Continue Effexor-XR/venlafaxine ER 75 mg daily for depression and anxiety.    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     Continue all other cares per primary care provider.     Continue all other medications as reviewed per electronic medical record today.     Safety plan reviewed. To the Emergency Department as needed or call after hours crisis line at [phone] or [phone]. [place] Crisis Text Line. Text [place] to 666568 or Suicide LifeLine Chat: suicidepreventionlifeline.org/chat    Consider individual psychotherapy for additional support and ongoing development of nonpharmacologic coping skills and strategies.    Schedule an appointment with me in 4-6 weeks or sooner as needed. Call [facility] at [phone] to schedule.    Follow up with primary care provider as planned or for acute medical concerns.    Call the psychiatric nurse line with medication questions or concerns at [phone].    [employer] may be used to communicate with your provider, but  this is not intended to be used for emergencies.    Administrative Billing:   Phone Call/Video Duration: 35 Minutes  Start: 10:38a  Stop: 11:13a    Time spent with patient was 35 minutes and greater than 50% of time or 18 minutes was spent in counseling and coordination of care regarding above diagnoses and treatment plan.    Patient Status:  Patient will continue to be seen for ongoing consultation and stabilization.    Signed:   Judy Pompa DO  [facility]    Disclaimer: This note consists of symbols derived from keyboarding, dictation and/or voice recognition software. As a result, there may be errors in the script that have gone undetected. Please consider this when interpreting information found in this chart.

## 2022-08-11 NOTE — PROGRESS NOTES
[facility]  August 11, 2022      Behavioral Health Clinician Progress Note    Patient Name: Jolynn Haji           Service Type:  Individual      Service Location:   [facility]     Session Start Time: 01:00 pm  Session End Time: 01:25 pm      Session Length: 16 - 37      Attendees: Patient     Service Modality:  Video Visit:      Provider verified identity through the following two step process.  Patient provided:  Patient is known previously to provider    Telemedicine Visit: The patient's condition can be safely assessed and treated via synchronous audio and visual telemedicine encounter.      Reason for Telemedicine Visit: Services only offered telehealth    Originating Site (Patient Location): Patient's home    Distant Site (Provider Location): Provider Remote Setting- Home Office    Consent:  The patient/guardian has verbally consented to: the potential risks and benefits of telemedicine (video visit) versus in person care; bill my insurance or make self-payment for services provided; and responsibility for payment of non-covered services.     Patient would like the video invitation sent by:  My Chart    Mode of Communication:  Video Conference via [facility]    As the provider I attest to compliance with applicable laws and regulations related to telemedicine.    Visit Activities (Refresh list every visit): [facility] Only    Diagnostic Assessment Date: 05/12/2022  Treatment Plan Review Date: 08/24/2022  See Flowsheets for today's PHQ-9 and TIFFANY-7 results  Previous PHQ-9:   PHQ-9 SCORE 6/8/2022 6/20/2022 7/11/2022   PHQ-9 Total Score - - -   PHQ-9 Total Score MyChart 7 (Mild depression) - 4 (Minimal depression)   PHQ-9 Total Score 7 6 4   Some encounter information is confidential and restricted. Go to Review Flowsheets activity to see all data.     Previous TIFFANY-7:   TIFFANY-7 SCORE 6/8/2022 6/20/2022 7/11/2022   Total Score - - -   Total Score 6 (mild anxiety) - 5 (mild  "anxiety)   Total Score 6 3 5       KARINA LEVEL:  KARINA Score (Last Two) 8/27/2010 2/22/2011   KARINA Raw Score 51 49   Activation Score 91.6 82.8   KARINA Level 4 4       DATA  Extended Session (60+ minutes): No  Interactive Complexity: No  Crisis: No  [facility] Patient: No    Treatment Objective(s) Addressed in This Session:  Target Behavior(s): anxiety    Anxiety: will develop more effective coping skills to manage anxiety symptoms    Current Stressors / Issues:  Reported that she continues to improve. She stated that she is no longer walking in a shuffle. Her anxiety is much better. She is no longer waking up with intense anxiety. She is more active, socializing more often, and she is very pleased with her progress. She has noticed that every 4 days or so she wakes up at night after 3 hours and feels rested. She will stay in bed but will plan the day. Sometimes she feels tired later in the day and sometimes she does not. She said she had a concussion 4 years ago and \"I did not sleep at all for about a month.\" She would like to know if Ambien is addictive or if there are other options that might not be addictive. She found the 2.5mg did not help as much as 5mg. She would also like to know if she can take something late at night when she cannot go back to sleep. She also has questions about whether the venlafaxine is causing her sleep problems at night. She would also like to discuss the plan for the Lexapro. We spoke about ways to learn some skills to better manage worry.      07/28/2022:  Reported that she was doing well until about 10 days ago when she started waking up with significant anxiety for several hours rating the intensity as 8/10. \"I've never had that before. I had to cancel coffee with my friend because I was so anxious.\" She described the anxiety as her heart racing and breathing heavy. She notices that the anxiety is better in the afternoon and gone by the evening. Mirtazapine helped initially for 2 days, but not " "as much now. The increase in Lexapro has been fine. She would like to know why she might be waking up with this kind of anxiety in the mornings and when it might end.       06/14/2022:  Reported that she has not been able to notice a difference by lowering the Abilify. Reported that she is still feeling depressed and does not feel it has gotten any better/worse than when she first started with CCPS. She is waking up with cramps. She would like to know how long it will take for her to feel better and would like to know how much she needs to push herself to do things. She spoke about going to lunch with some friends and had some anticipation anxiety. She worried that she would not be able to stay focused on the conversation and participate. The anxiety did go away when she got there but she felt tired and had to leave about an hour later which is unusual as they usually have lunch for about 3 hours.       05/24/2022:  Reported that she can focus \"a little longer than before\" and she feels she is \"staying in the present\" rather than worrying about the next thing to come. Her sleep is a little better. Instead of waking up at 1:30am, she sleeps until about 4:30am, uses the bathroom, and she can go back to sleep. She still feels tired during the day and estimated that it is about the same as before. She said that she is able to complete tasks. \"I won't win any races but I can get them done now.\" She noted that she had no concerns about her current medications.      05/12/2022 (DAVID):  \"I'm hoping we can figure out what triggered this setback.\" She was diagnosed with anxiety and depression after a concussion a concussion in 04/2017. She had been sick at the time, got up one night and fell. She is uncertain where on her head she landed. She was on Lexapro, mirtazapine, and seroquel. She thought that it was \"fixed\", stopped taking the mirtazapine and seroquel but stayed on the lexapro. In January, started feeling more anxiety. " "PCP added Abilify which was helpful. The Lexapro was increased which she is not sure if it has been helpful (been on it for 4 weeks). She said, \"I feel like I'm in la-la land. I have a hard time thinking things through.\" Her anxiety and sleep is better but bothered by the slowed thinking.       Progress on Treatment Objective(s) / Homework:  Minimal progress - ACTION (Actively working towards change); Intervened by reinforcing change plan / affirming steps taken    Also provided psychoeducation about behavioral health condition, symptoms, and treatment options    Care Plan review completed: Yes    Medication Review:  Changes to psychiatric medications, see updated Medication List in EPIC.     Medication Compliance:  Yes    Changes in Health Issues:   None reported    Chemical Use Review:   Substance Use: Chemical use reviewed, no active concerns identified      Tobacco Use: No current tobacco use.      Assessment: Current Emotional / Mental Status (status of significant symptoms):  Risk status (Self / Other harm or suicidal ideation)  Patient denies a history of suicidal ideation, suicide attempts, self-injurious behavior, homicidal ideation, homicidal behavior and and other safety concerns  Patient denies current fears or concerns for personal safety.  Patient denies current or recent suicidal ideation or behaviors.  Patient denies current or recent homicidal ideation or behaviors.  Patient denies current or recent self injurious behavior or ideation.  Patient denies other safety concerns.  A safety and risk management plan has not been developed at this time, however patient was encouraged to call [facility] should there be a change in any of these risk factors.    Appearance:   Appropriate   Eye Contact:   Good   Psychomotor Behavior: Normal   Attitude:   Cooperative   Orientation:   All  Speech   Rate / Production: Normal    Volume:  Normal   Mood:    Anxious  Normal  Affect:    Restricted   Thought " Content:  Clear   Thought Form:  Coherent  Logical   Insight:    Fair     Diagnoses:  1. TIFFANY (generalized anxiety disorder)    2. Insomnia, unspecified type        Collateral Reports Completed:  Communicated with: Dr. Pompa    Plan: (Homework, other):  Patient was given information about behavioral services and encouraged to schedule a follow up appointment with the clinic [facility]  She was also given information about mental health symptoms and treatment options .  CD Recommendations: No indications of CD issues.  Eran Landon, Iva, LP      ______________________________________________________________________    [facility] [place]: Treatment Plan    Patient's Name: Jolynn Haji  YOB: 1955    Date of Creation: May 24, 2022  Date Treatment Plan Last Reviewed/Revised: May 24, 2022    DSM5 Diagnoses: 300.02 (F41.1) Generalized Anxiety Disorder or 780.52 (G47.00) Insomnia Disorder   With non-sleep disorder mental comorbidity  Persistent    Psychosocial / Contextual Factors: family  PROMIS (reviewed every 90 days):   PROMIS 10-Global Health (only subscores and total score):   PROMIS-10 Scores Only 5/12/2022 5/12/2022 7/1/2022   Global Mental Health Score 9 9 17   Global Physical Health Score 17 17 13   PROMIS TOTAL - SUBSCORES 26 26 30       Referral / Collaboration:  Referral to another professional/service is not indicated at this time..    Anticipated number of session for this episode of care: 5-6  Anticipation frequency of session: As determined by Dr. Pompa  Anticipated Duration of each session: 16-37 minutes  Treatment plan will be reviewed in 90 days or when goals have been changed.       MeasurableTreatment Goal(s) related to diagnosis / functional impairment(s)  Goal 1: Patient will work with providers to manage symptoms    I will know I've met my goal when I can finish my tasks.      Objective #A (Patient  Action)  Patient will attend all appointments, take medication as prescribed.  Status: New - Date: 05/24/2022     Intervention(s)  [facility] will Monitor and assist in overcoming barriers to treatment adherence    Objective #B  Patient will consider all recommendations offered.  Status: New - Date: 05/24/2022      Intervention(s)  [facility] will educate patient on treatment options, clarify concerns, work with pt to overcome any resistance to compliance.      Patient has reviewed and agreed to the above plan.      Anthony Landon PsyD  May 24, 2022

## 2022-08-16 ENCOUNTER — THERAPY VISIT (OUTPATIENT)
Dept: PHYSICAL THERAPY | Facility: CLINIC | Age: 67
End: 2022-08-16
Payer: COMMERCIAL

## 2022-08-16 DIAGNOSIS — G89.29 CHRONIC PAIN OF LEFT KNEE: Primary | ICD-10-CM

## 2022-08-16 DIAGNOSIS — M25.562 CHRONIC PAIN OF LEFT KNEE: Primary | ICD-10-CM

## 2022-08-16 PROCEDURE — 97110 THERAPEUTIC EXERCISES: CPT | Mod: GP | Performed by: PHYSICAL THERAPIST

## 2022-08-16 NOTE — PATIENT INSTRUCTIONS
Treatment Plan:  Discontinue/decrease Lexapro: Take 5 mg for 2 weeks and then discontinue the medication.  Continue Ambien 5 mg at bedtime as needed for sleep. Do not take with alcohol or opioid pain medication. Make sure to have 8-10 hours to devote to sleep after taking. Careful/caution for falls in middle of night.   Continue Effexor-XR/venlafaxine ER 75 mg daily for depression and anxiety.  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   Continue all other cares per primary care provider.   Continue all other medications as reviewed per electronic medical record today.   Safety plan reviewed. To the Emergency Department as needed or call after hours crisis line at [phone] or [phone]. [place] Crisis Text Line. Text [place] to 608507 or Suicide LifeLine Chat: suicidepreventionlifeline.org/chat  Consider individual psychotherapy for additional support and ongoing development of nonpharmacologic coping skills and strategies.  Schedule an appointment with me in 4-6 weeks or sooner as needed. Call [facility] at [phone] to schedule.  Follow up with primary care provider as planned or for acute medical concerns.  Call the psychiatric nurse line with medication questions or concerns at [phone].  MyChart may be used to communicate with your provider, but this is not intended to be used for emergencies.

## 2022-08-25 ENCOUNTER — OFFICE VISIT (OUTPATIENT)
Dept: FAMILY MEDICINE | Facility: CLINIC | Age: 67
End: 2022-08-25
Payer: COMMERCIAL

## 2022-08-25 VITALS
WEIGHT: 129.4 LBS | HEART RATE: 103 BPM | OXYGEN SATURATION: 98 % | BODY MASS INDEX: 19.61 KG/M2 | DIASTOLIC BLOOD PRESSURE: 60 MMHG | HEIGHT: 68 IN | TEMPERATURE: 98.6 F | SYSTOLIC BLOOD PRESSURE: 130 MMHG | RESPIRATION RATE: 16 BRPM

## 2022-08-25 DIAGNOSIS — I10 ESSENTIAL HYPERTENSION WITH GOAL BLOOD PRESSURE LESS THAN 140/90: Primary | ICD-10-CM

## 2022-08-25 DIAGNOSIS — R41.843 PSYCHOMOTOR RETARDATION: ICD-10-CM

## 2022-08-25 DIAGNOSIS — F51.02 ADJUSTMENT INSOMNIA: ICD-10-CM

## 2022-08-25 DIAGNOSIS — F43.23 ADJUSTMENT DISORDER WITH MIXED ANXIETY AND DEPRESSED MOOD: ICD-10-CM

## 2022-08-25 PROCEDURE — 99214 OFFICE O/P EST MOD 30 MIN: CPT | Performed by: FAMILY MEDICINE

## 2022-08-25 ASSESSMENT — PATIENT HEALTH QUESTIONNAIRE - PHQ9
SUM OF ALL RESPONSES TO PHQ QUESTIONS 1-9: 7
10. IF YOU CHECKED OFF ANY PROBLEMS, HOW DIFFICULT HAVE THESE PROBLEMS MADE IT FOR YOU TO DO YOUR WORK, TAKE CARE OF THINGS AT HOME, OR GET ALONG WITH OTHER PEOPLE: SOMEWHAT DIFFICULT
SUM OF ALL RESPONSES TO PHQ QUESTIONS 1-9: 7

## 2022-08-25 ASSESSMENT — ANXIETY QUESTIONNAIRES
7. FEELING AFRAID AS IF SOMETHING AWFUL MIGHT HAPPEN: NOT AT ALL
GAD7 TOTAL SCORE: 8
2. NOT BEING ABLE TO STOP OR CONTROL WORRYING: MORE THAN HALF THE DAYS
7. FEELING AFRAID AS IF SOMETHING AWFUL MIGHT HAPPEN: NOT AT ALL
6. BECOMING EASILY ANNOYED OR IRRITABLE: NOT AT ALL
4. TROUBLE RELAXING: SEVERAL DAYS
3. WORRYING TOO MUCH ABOUT DIFFERENT THINGS: SEVERAL DAYS
8. IF YOU CHECKED OFF ANY PROBLEMS, HOW DIFFICULT HAVE THESE MADE IT FOR YOU TO DO YOUR WORK, TAKE CARE OF THINGS AT HOME, OR GET ALONG WITH OTHER PEOPLE?: NOT DIFFICULT AT ALL
GAD7 TOTAL SCORE: 8
1. FEELING NERVOUS, ANXIOUS, OR ON EDGE: NEARLY EVERY DAY
IF YOU CHECKED OFF ANY PROBLEMS ON THIS QUESTIONNAIRE, HOW DIFFICULT HAVE THESE PROBLEMS MADE IT FOR YOU TO DO YOUR WORK, TAKE CARE OF THINGS AT HOME, OR GET ALONG WITH OTHER PEOPLE: NOT DIFFICULT AT ALL
5. BEING SO RESTLESS THAT IT IS HARD TO SIT STILL: SEVERAL DAYS
GAD7 TOTAL SCORE: 8

## 2022-08-25 NOTE — LETTER
[facility]  [address] [place] [id number] [phone]          August 25, 2022    Lizzetha L Adithya                                                                                                                     [id number] [facility] [id number]      Current Outpatient Medications   Medication Sig Dispense Refill     clindamycin (CLINDAGEL) 1 % external gel Apply topically daily 60 g 11     lactobacillus rhamnosus, GG, (CULTURELL) capsule Take 1 capsule by mouth daily       lisinopril (ZESTRIL) 2.5 MG tablet Take 1 tablet (2.5 mg) by mouth daily 90 tablet 3     venlafaxine (EFFEXOR XR) 75 MG 24 hr capsule Take 1 capsule (75 mg) by mouth daily 90 capsule 0     zolpidem (AMBIEN) 5 MG tablet Take 1 tablet (5 mg) by mouth nightly as needed for sleep 30 tablet 1        Allergies   Allergen Reactions     Abilify [Aripiprazole] Other (See Comments)     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 related to mold in school building- pt has since retired and no further bronchitis     Rosacea     History of migraine headaches- assoc. with nausea/vomiting - resolved around menopause - were  premenstrual      Dupuytren's disease of palm - right 4th digit flexor tendon     Sun-damaged skin     Seasonal allergic rhinitis     Basal cell carcinoma of leg, right     Adjustment disorder with mixed anxiety and depressed mood-  mild-moderate  at this time     Family history of celiac disease     TIFFANY (generalized anxiety disorder)     Insomnia, unspecified type     Chronic constipation     Raynaud's phenomenon without gangrene     Other secondary osteoarthritis of left knee     Primary localized osteoarthrosis of left lower leg     Hyperlipidemia LDL goal <130     Basal cell carcinoma     Colon polyps     Screening for cervical cancer     Adjustment insomnia     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      Chronic pain of left knee     Past Medical History:   Diagnosis Date     Abnormal glandular Papanicolaou smear of cervix- ASCUS in 2003    [facility] H-      Basal cell carcinoma of leg, right 2016    Dr Rogers     Colon polyps 2018    tubular adenoam - due 5 yrs - 2 mm     Concussion without loss of consciousness 2017     Hidradenitis     left  groin     HSIL (high grade squamous intraepithelial lesion) on Pap smear of cervix 2000    Normal paps from  to /     Hypertension goal BP (blood pressure) < 140/90      OA (osteoarthritis) of knee     moderate     Postmenopausal since 2008      Shingles 2010    left      Squamous cell carcinoma of skin, unspecified      Synovial cyst of popliteal space          Past Surgical History:   Procedure Laterality Date     COLONOSCOPY  2018    polyps, tubular - 2 mm - due 5 yrs     ZZC NONSPECIFIC PROCEDURE      Colposcopy     Family History   Problem Relation Age of Onset     Thyroid Disease Mother         Graves disease     Hypertension Mother      Diabetes Mother         type 2     Blood Disease Mother          from leukemia at age 78     Diabetes Daughter         Juvenile Diabetes     Heart Disease Father 62        MV problem with CHF  at 62/ from MI     Hypertension Father      Diabetes Son         Juvenile Diabetes     Cancer Maternal Uncle         type ??     Cancer  Maternal Uncle         liver       Immunization History   Administered Date(s) Administered     COVID-19,PF,Moderna 02/05/2021, 03/02/2021, 10/25/2021, 04/22/2022     Flu 65+ Years 10/10/2019, 10/05/2020     Influenza (H1N1) 12/28/2009     Influenza (High Dose) 3 valent vaccine 10/15/2019, 10/01/2020     Influenza (IIV3) PF 11/15/2001, 11/16/2004, 10/30/2009, 10/03/2012, 10/02/2013, 10/08/2014     Influenza Intranasal Vaccine 4 valent (FluMist) 10/25/2016     Influenza Vaccine IM > 6 months Valent IIV4 (Alfuria,Fluzone) 10/11/2017, 11/03/2018     Influenza, Quad, High Dose, Pf, 65yr+ (Fluzone HD) 10/01/2021, 10/01/2021     Pneumo Conj 13-V (2010&after) 12/11/2019     Pneumococcal 23 valent 10/23/2012, 01/13/2021     TD (ADULT, 7+) 04/11/1995, 02/14/2005     TDAP Vaccine (Boostrix) 10/11/2011     Tdap (Adacel,Boostrix) 01/13/2021     Zoster vaccine recombinant adjuvanted (SHINGRIX) 12/03/2018, 02/14/2019     Zoster vaccine, live 10/11/2011     Social History     Socioeconomic History     Marital status:      Spouse name: Zach     Number of children: 2     Years of education: 16     Highest education level: Not on file   Occupational History     Occupation: teacher first grade -retired at age 60     Comment: [employer]     Employer: [employer]   Tobacco Use     Smoking status: Never Smoker     Smokeless tobacco: Never Used   Substance and Sexual Activity     Alcohol use: Yes     Alcohol/week: 0.0 - 1.0 standard drinks     Comment: 2 per month     Drug use: No     Comment: no herbal meds either     Sexual activity: Yes     Partners: Male     Birth control/protection: Surgical     Comment: -was on DepoProvera since 2000-2/2008 -  had vasectomy   Other Topics Concern      Service No     Blood Transfusions No     Caffeine Concern No     Occupational Exposure No     Hobby Hazards No     Sleep Concern No     Stress Concern No     Weight Concern No     Special Diet  No     Back Care No     Exercise Yes     Comment: regular      Bike Helmet No     Seat Belt Yes     Comment: always     Self-Exams Yes     Comment: SBE  encouraged monthly     Parent/sibling w/ CABG, MI or angioplasty before 65F 55M? No   Social History Narrative    [place] Bryson - going to be 6 yrs old spring 2021---needs walks every day. --Helga Ibarra MD      January 13, 2021      Social Determinants of Health     Financial Resource Strain: Not on file   Food Insecurity: Not on file   Transportation Needs: Not on file   Physical Activity: Not on file   Stress: Not on file   Social Connections: Not on file   Intimate Partner Violence: Not on file   Housing Stability: Not on file

## 2022-08-25 NOTE — PROGRESS NOTES
Assessment & Plan       ICD-10-CM    1. Essential hypertension with goal blood pressure less than 140/90  I10    2. 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 Adjustment disorder with mixed anxiety and depressed mood- mild-moderate  at this time  F43.23    4. Adjustment insomnia  F51.02         Jury duty letter - illness excuse given to pt to excuse her for the next 8 months. Done.     Please,  continue your current medications and/or supplements and follow up as we discussed at your last visit.       Prescription drug management  44 minutes spent on the date of the encounter doing chart review, history and exam, documentation and further activities per the note       MEDICATIONS:   No orders of the defined types were placed in this encounter.         - Continue other medications without change  Work on weight loss  Regular exercise  See Patient Instructions    Return in 5 weeks (on 9/29/2022) for depression/anxiety follow up, w/ Dr. SUE for 40 minute appointment.         Helga Ibarra MD  [facility] :   Azalea is a 67 year old, with daughter , Malini,  presenting for the following health issues:  RECHECK (Mood)  Dr. Eran Landon is her therapist. - seeing him every 3-4 weeks, then sees Dr. Pompa after that.      Dr. Enriqueta Pompa, her psychiatrist, tapered her off lexapro and onto effexor. Last night was last dose of lexapro.  She feels a little overmedicated with the effexor =   She was getting some heart palpitations and having difficulty sleeping and Dr. Pompa rx'd ambien for her about 1 month ago. Falls asleep immediately.      She awakens about 0520-5516 through the night now. Feels a little tired during the day.  ambien makes her feel a little nauseated.  She;ll talk with Dr. Pompa about that next week.   She's walking 20 minute daily now.      Meeting with   Aletha next week - will discuss gettng off Ambien next week per Dr. Pompa's plan.      She's been getting out more for social activities.   is very supportive of her and continues to be so.      Has a doctor's appointment on Monday - shakiness, shuffling gait balance, etc. Not going away.  So pt is seeing a neurologist to ensure pt doesn't have drug induced Parkinson's related to abilify.  Pt has appt with [facility] in [place] on Monday 8/29/2022 .  Also appt with Neurology at [facility]  In November 2022.  Is much improved from 1 month ago per daughter.      Compared to 1 month ago - she feels like she can read the newspaper, Christian the dog has been home for a week from her son's and she's been walking him, her appetite has improved, she can get more done in the day - can clean one whole level of her home.      Wt Readings from Last 5 Encounters:   08/25/22 58.7 kg (129 lb 6.4 oz)   07/11/22 58.5 kg (129 lb)   07/01/22 59.6 kg (131 lb 6.4 oz)   06/20/22 59.4 kg (131 lb)   06/08/22 59.4 kg (131 lb)       Needs Jury duty excuse - done.          History of Present Illness       Mental Health Follow-up:  Patient presents to follow-up on Depression & Anxiety.Patient's depression since last visit has been:  Better  The patient is not having other symptoms associated with depression.  Patient's anxiety since last visit has been:  Worse  The patient is not having other symptoms associated with anxiety.  Any significant life events: No  Patient is feeling anxious or having panic attacks.  Patient has no concerns about alcohol or drug use.    She eats 2-3 servings of fruits and vegetables daily.She exercises with enough effort to increase her heart rate 10 to 19 minutes per day.  She exercises with enough effort to increase her heart rate 4 days per week.   She is taking medications regularly.    Today's PHQ-9         PHQ-9 Total Score: 7    PHQ-9 Q9 Thoughts of better off  dead/self-harm past 2 weeks :   Not at all    How difficult have these problems made it for you to do your work, take care of things at home, or get along with other people: Somewhat difficult  Today's TIFFANY-7 Score: 8           Social History     Tobacco Use     Smoking status: Never Smoker     Smokeless tobacco: Never Used   Substance Use Topics     Alcohol use: Yes     Alcohol/week: 0.0 - 1.0 standard drinks     Comment: 2 per month     Drug use: No     Comment: no herbal meds either     PHQ 6/20/2022 7/11/2022 8/25/2022   PHQ-9 Total Score 6 4 7   Q9: Thoughts of better off dead/self-harm past 2 weeks Not at all Not at all Not at all   Some encounter information is confidential and restricted. Go to Review [employer] activity to see all data.     TIFFANY-7 SCORE 6/20/2022 7/11/2022 8/25/2022   Total Score - - -   Total Score - 5 (mild anxiety) 8 (mild anxiety)   Total Score 3 5 8     Last PHQ-9 8/25/2022   1.  Little interest or pleasure in doing things 1   2.  Feeling down, depressed, or hopeless 0   3.  Trouble falling or staying asleep, or sleeping too much 3   4.  Feeling tired or having little energy 2   5.  Poor appetite or overeating 0   6.  Feeling bad about yourself 0   7.  Trouble concentrating 1   8.  Moving slowly or restless 0   Q9: Thoughts of better off dead/self-harm past 2 weeks 0   PHQ-9 Total Score 7   Difficulty at work, home, or with people -   Some encounter information is confidential and restricted. Go to [employer] activity to see all data.     TIFFANY-7  8/25/2022   1. Feeling nervous, anxious, or on edge 3   2. Not being able to stop or control worrying 2   3. Worrying too much about different things 1   4. Trouble relaxing 1   5. Being so restless that it is hard to sit still 1   6. Becoming easily annoyed or irritable 0   7. Feeling afraid, as if something awful might happen 0   TIFFANY-7 Total Score 8   If you checked any problems, how difficult have they made it for you to do your work,  take care of things at home, or get along with other people? Not difficult at all       Suicide Assessment Five-step Evaluation and Treatment (SAFE-T)        Review of Systems   Constitutional, HEENT, cardiovascular, pulmonary, GI, , musculoskeletal, neuro, skin, endocrine and psych systems are negative, except as otherwise noted.      Objective    LMP 06/28/2001   There is no height or weight on file to calculate BMI.  Physical Exam   GENERAL: healthy, alert and no distress  EYES: Eyes grossly normal to inspection, PERRL and conjunctivae and sclerae normal  HENT: ear canals and TM's normal, nose and mouth without ulcers or lesions  NECK: no adenopathy, no asymmetry, masses, or scars and thyroid normal to palpation  RESP: lungs clear to auscultation - no rales, rhonchi or wheezes  BREAST: normal without masses, tenderness or nipple discharge and no palpable axillary masses or adenopathy  CV: regular rate and rhythm, normal S1 S2, no S3 or S4, no murmur, click or rub, no peripheral edema and peripheral pulses strong  ABDOMEN: soft, nontender, no hepatosplenomegaly, no masses and bowel sounds normal  MS: no gross musculoskeletal defects noted, no edema  SKIN: no suspicious lesions or rashes  NEURO: Normal strength and tone, mentation intact and speech normal  PSYCH: mentation appears normal, affect normal/bright, Alert and oriented. No acute distress. Appears well-groomed and casually dressed. Affect is normal, only very mildly  depressed. In good humor and laughs appropriately. Not particularly anxious. No evidence of psychosis.     Office Visit on 08/05/2022   Component Date Value Ref Range Status     Case Report 08/05/2022    Final                    Value:Surgical Pathology Report                         Case: [id number]                                  Authorizing Provider:  Ligia Carbajal PA-C    Collected:           08/05/2022 10:44 AM          Ordering Location:     [facility]   Received:             08/05/2022 04:17 PM                                 [facility]                                                           Pathologist:           Rodrigo Taylor MD                                                      Specimens:   A) - Skin, left forearm                                                                             B) - Skin, right lower leg                                                                  Final Diagnosis 08/05/2022    Final                    Value:This result contains rich text formatting which cannot be displayed here.     Clinical Information 08/05/2022    Final                    Value:This result contains rich text formatting which cannot be displayed here.     Gross Description 08/05/2022    Final                    Value:This result contains rich text formatting which cannot be displayed here.     Microscopic Description 08/05/2022    Final                    Value:This result contains rich text formatting which cannot be displayed here.     Performing Labs 08/05/2022    Final                    Value:This result contains rich text formatting which cannot be displayed here.     No results found for any visits on 08/25/22.                .  ..

## 2022-08-25 NOTE — LETTER
[address] [address]  [phone]       August 25, 2022    Jolynn Haji  [address] [address]  [place] [address]    To Whom it May Concern:    The above patient is unable to attend Jury duty for the next 8 months due to a medical issue affecting her physical and mental health.  Please excuse her from jury duty until at least 4/25/2023.   Please contact me with questions or concerns.      Sincerely,           Helga Ibarra MD

## 2022-09-01 ENCOUNTER — VIRTUAL VISIT (OUTPATIENT)
Dept: BEHAVIORAL HEALTH | Facility: CLINIC | Age: 67
End: 2022-09-01
Payer: COMMERCIAL

## 2022-09-01 ENCOUNTER — VIRTUAL VISIT (OUTPATIENT)
Dept: PSYCHIATRY | Facility: CLINIC | Age: 67
End: 2022-09-01
Payer: COMMERCIAL

## 2022-09-01 ENCOUNTER — TELEPHONE (OUTPATIENT)
Dept: FAMILY MEDICINE | Facility: CLINIC | Age: 67
End: 2022-09-01

## 2022-09-01 DIAGNOSIS — G47.00 INSOMNIA, UNSPECIFIED TYPE: Primary | ICD-10-CM

## 2022-09-01 DIAGNOSIS — Z87.820 HISTORY OF TRAUMATIC BRAIN INJURY: ICD-10-CM

## 2022-09-01 DIAGNOSIS — G21.19 DRUG-INDUCED PARKINSONISM (H): ICD-10-CM

## 2022-09-01 DIAGNOSIS — R11.0 NAUSEA: ICD-10-CM

## 2022-09-01 DIAGNOSIS — F41.1 GAD (GENERALIZED ANXIETY DISORDER): ICD-10-CM

## 2022-09-01 DIAGNOSIS — F41.1 GENERALIZED ANXIETY DISORDER: Primary | ICD-10-CM

## 2022-09-01 PROCEDURE — 90832 PSYTX W PT 30 MINUTES: CPT | Mod: 95 | Performed by: SOCIAL WORKER

## 2022-09-01 PROCEDURE — 99214 OFFICE O/P EST MOD 30 MIN: CPT | Mod: 95 | Performed by: PSYCHIATRY & NEUROLOGY

## 2022-09-01 RX ORDER — ZOLPIDEM TARTRATE 6.25 MG/1
6.25 TABLET, FILM COATED, EXTENDED RELEASE ORAL
Qty: 30 TABLET | Refills: 1 | Status: SHIPPED | OUTPATIENT
Start: 2022-09-01 | End: 2022-09-07

## 2022-09-01 RX ORDER — ONDANSETRON 4 MG/1
4 TABLET, ORALLY DISINTEGRATING ORAL EVERY 8 HOURS PRN
Qty: 30 TABLET | Refills: 0 | Status: SHIPPED | OUTPATIENT
Start: 2022-09-01 | End: 2022-12-26

## 2022-09-01 NOTE — PROGRESS NOTES
[facility]  2022      Behavioral Health Clinician Progress Note    Patient Name: Jolynn Haji           Service Type:  Individual      Service Location:   [facility]     Session Start Time: 10:00 am  Session End Time: 10:31 am      Session Length: 16 - 37      Attendees: Patient     Service Modality:  Video Visit:      Provider verified identity through the following two step process.  Patient provided:  Patient photo, Patient  and Patient address    Telemedicine Visit: The patient's condition can be safely assessed and treated via synchronous audio and visual telemedicine encounter.      Reason for Telemedicine Visit: Services only offered telehealth    Originating Site (Patient Location): Patient's home    Distant Site (Provider Location): Provider Remote Setting- Home Office    Consent:  The patient/guardian has verbally consented to: the potential risks and benefits of telemedicine (video visit) versus in person care; bill my insurance or make self-payment for services provided; and responsibility for payment of non-covered services.     Patient would like the video invitation sent by:  My Chart    Mode of Communication:  Video Conference via [facility]    As the provider I attest to compliance with applicable laws and regulations related to telemedicine.    Visit Activities (Refresh list every visit): [facility] Only    Diagnostic Assessment Date: 2022  Treatment Plan Review Date: Completed 2022, next due by 2022  See Flowsheets for today's PHQ-9 and TIFFANY-7 results  Previous PHQ-9:   PHQ-9 SCORE 2022   PHQ-9 Total Score - - -   PHQ-9 Total Score MyChart - 4 (Minimal depression) 7 (Mild depression)   PHQ-9 Total Score 6 4 7   Some encounter information is confidential and restricted. Go to Review [employer] activity to see all data.     Previous TIFFANY-7:   TIFFANY-7 SCORE 2022   Total Score - - -  "  Total Score - 5 (mild anxiety) 8 (mild anxiety)   Total Score 3 5 8       KARINA LEVEL:  KARINA Score (Last Two) 8/27/2010 2/22/2011   KARINA Raw Score 51 49   Activation Score 91.6 82.8   KARINA Level 4 4       DATA  Extended Session (60+ minutes): No  Interactive Complexity: No  Crisis: No  [facility] Patient: No    Treatment Objective(s) Addressed in This Session:  Target Behavior(s): anxiety    Anxiety: will develop more effective coping skills to manage anxiety symptoms    Current Stressors / Issues:   Update: still not sleeping well, getting nauseous and worried it could be related to the Ambien or possibly the Effexor. Mood continues to improve but \"anxious to feel better.\" Has been spending time with friends and finds that helpful. Has moments of feeling \"thick headed\" during the day and worries is related to Ambien. Energy has increased during the day, able to accomplish more. Better concentrate and comprehending what she does read.  [facility] validated patient for her continued distress.  [facility] normalized patient needing to allow herself, her brain and her medication more time to adjust to all of these changes.  [facility] processed patient using kindness with herself and trying not to compare how she is doing now with how she was doing in the past.  [facility] discussed patient using the idea of pacing and focusing on doing something for a small period of time instead of feeling that she needs to accomplish the entire task.  [facility] also discussed patient allowing family to help and simplifying where she can.      Patient indicates she saw a neurologist this week in order to establish baseline.  She was told by that provider but there was nothing to worry about and not to have anxiety about her health.  Patient has follow-up in 3 months.  Patient is hopeful that the symptoms she was experiencing were related to side effects of Abilify and not something more.    SI: passive thoughts of wishing life would get better, how long can I live like this. " "Denies having a plan or intent. No prior attempts. Has people and things to live for. Able to redirect self and feels safe. Has supports she can reach out to and aware of crisis resources.    Tx: not in therapy, [facility] to place referral for [facility] and health psychologist    Sleep: able to fall asleep but then wakes up at 3 am and cant get back to sleep. Not noticing any anxious thoughts. Sometimes wakes up due to nausea and waits for it to pass.  [facility] used CBT I to review sleep practices and praised patient for having a healthy routine and relaxation time.  [facility] discussed safe space visualization and patient trying to use this when she wakes up to see if it assists with her getting back to sleep.  Appetite: improved, had lost 30 pounds and feels that is getting back to better place    Etoh: denies  Substance: denies  Nicotine: denies  Caffeine: 1 cup of coffee per day    Most Important: side effect of feeling nauseous and still not sleeping well, sometimes nausea that wakes up. Is there an Ambien slow release or something else to stop the nausea?    08/11/2022:  Reported that she continues to improve. She stated that she is no longer walking in a shuffle. Her anxiety is much better. She is no longer waking up with intense anxiety. She is more active, socializing more often, and she is very pleased with her progress. She has noticed that every 4 days or so she wakes up at night after 3 hours and feels rested. She will stay in bed but will plan the day. Sometimes she feels tired later in the day and sometimes she does not. She said she had a concussion 4 years ago and \"I did not sleep at all for about a month.\" She would like to know if Ambien is addictive or if there are other options that might not be addictive. She found the 2.5mg did not help as much as 5mg. She would also like to know if she can take something late at night when she cannot go back to sleep. She also has questions about whether the venlafaxine is causing " "her sleep problems at night. She would also like to discuss the plan for the Lexapro. We spoke about ways to learn some skills to better manage worry.      07/28/2022:  Reported that she was doing well until about 10 days ago when she started waking up with significant anxiety for several hours rating the intensity as 8/10. \"I've never had that before. I had to cancel coffee with my friend because I was so anxious.\" She described the anxiety as her heart racing and breathing heavy. She notices that the anxiety is better in the afternoon and gone by the evening. Mirtazapine helped initially for 2 days, but not as much now. The increase in Lexapro has been fine. She would like to know why she might be waking up with this kind of anxiety in the mornings and when it might end.       06/14/2022:  Reported that she has not been able to notice a difference by lowering the Abilify. Reported that she is still feeling depressed and does not feel it has gotten any better/worse than when she first started with CCPS. She is waking up with cramps. She would like to know how long it will take for her to feel better and would like to know how much she needs to push herself to do things. She spoke about going to lunch with some friends and had some anticipation anxiety. She worried that she would not be able to stay focused on the conversation and participate. The anxiety did go away when she got there but she felt tired and had to leave about an hour later which is unusual as they usually have lunch for about 3 hours.       05/24/2022:  Reported that she can focus \"a little longer than before\" and she feels she is \"staying in the present\" rather than worrying about the next thing to come. Her sleep is a little better. Instead of waking up at 1:30am, she sleeps until about 4:30am, uses the bathroom, and she can go back to sleep. She still feels tired during the day and estimated that it is about the same as before. She said that she " "is able to complete tasks. \"I won't win any races but I can get them done now.\" She noted that she had no concerns about her current medications.      05/12/2022 (DA):  \"I'm hoping we can figure out what triggered this setback.\" She was diagnosed with anxiety and depression after a concussion a concussion in 04/2017. She had been sick at the time, got up one night and fell. She is uncertain where on her head she landed. She was on Lexapro, mirtazapine, and seroquel. She thought that it was \"fixed\", stopped taking the mirtazapine and seroquel but stayed on the lexapro. In January, started feeling more anxiety. PCP added Abilify which was helpful. The Lexapro was increased which she is not sure if it has been helpful (been on it for 4 weeks). She said, \"I feel like I'm in [place]-la land. I have a hard time thinking things through.\" Her anxiety and sleep is better but bothered by the slowed thinking.       Progress on Treatment Objective(s) / Homework:  Minimal progress - ACTION (Actively working towards change); Intervened by reinforcing change plan / affirming steps taken    Also provided psychoeducation about behavioral health condition, symptoms, and treatment options    Care Plan review completed: Yes    Medication Review:  Changes to psychiatric medications, see updated Medication List in EPIC.     Medication Compliance:  Yes    Changes in Health Issues:   Yes: Sleep disturbance, Associated Psychological Distress    Chemical Use Review:   Substance Use: Chemical use reviewed, no active concerns identified      Tobacco Use: No current tobacco use.      Assessment: Current Emotional / Mental Status (status of significant symptoms):  Risk status (Self / Other harm or suicidal ideation)  Patient denies a history of suicidal ideation, suicide attempts, self-injurious behavior, homicidal ideation, homicidal behavior and and other safety concerns  Patient denies current fears or concerns for personal safety.  Patient " reports the following current or recent suicidal ideation or behaviors: Patient reports passive thoughts about questioning life but denies that she was suicidal.  Patient does feel safe.  Patient denies current or recent homicidal ideation or behaviors.  Patient denies current or recent self injurious behavior or ideation.  Patient denies other safety concerns.  A safety and risk management plan has not been developed at this time, however patient was encouraged to call [place] [phone] should there be a change in any of these risk factors.    Appearance:   Appropriate   Eye Contact:   Good   Psychomotor Behavior: Normal   Attitude:   Cooperative   Orientation:   All  Speech   Rate / Production: Normal    Volume:  Normal   Mood:    Anxious   Affect:    Appropriate   Thought Content:  Clear   Thought Form:  Coherent  Logical   Insight:    Fair     Diagnoses:  1. Generalized anxiety disorder      Collateral Reports Completed:  Communicated with: Dr. Pompa    Plan: (Homework, other):  Patient was given information about behavioral services and encouraged to schedule a follow up appointment [facility]  She was also given information about mental health symptoms and treatment options .  CD Recommendations: No indications of CD issues.      ______________________________________________________________________    [facility]: Treatment Plan    Patient's Name: Jolynn Haji  YOB: 1955    Date of Creation: May 24, 2022  Date Treatment Plan Last Reviewed/Revised: 09/01/2022    DSM5 Diagnoses: 300.02 (F41.1) Generalized Anxiety Disorder or 780.52 (G47.00) Insomnia Disorder   With non-sleep disorder mental comorbidity  Persistent    Psychosocial / Contextual Factors: family  PROMIS (reviewed every 90 days):   PROMIS 10-Global Health (only subscores and total score):   PROMIS-10 Scores Only 5/12/2022  5/12/2022 7/1/2022   Global Mental Health Score 9 9 17   Global Physical Health Score 17 17 13   PROMIS TOTAL - SUBSCORES 26 26 30       Referral / Collaboration:  The following referral(s) will be initiated: [facility] to place referral for either [facility] or health psychology services.    Anticipated number of session for this episode of care: 5-6  Anticipation frequency of session: As determined by Dr. Pompa  Anticipated Duration of each session: 16-37 minutes  Treatment plan will be reviewed in 90 days or when goals have been changed.       MeasurableTreatment Goal(s) related to diagnosis / functional impairment(s)  Goal 1: Patient will work with providers to manage symptoms    I will know I've met my goal when I can finish my tasks.      Objective #A (Patient Action)  Patient will attend all appointments, take medication as prescribed.  Status: Continued - Date(s):  9/01/2022    Intervention(s)  [facility] will Monitor and assist in overcoming barriers to treatment adherence    Objective #B  Patient will consider all recommendations offered.  Status: Continued - Date(s): 09/01/2022     Intervention(s)  [facility] will educate patient on treatment options, clarify concerns, work with pt to overcome any resistance to compliance.      Patient has reviewed and agreed to the above plan.      Pily Betts, DINESH  September 1, 2022

## 2022-09-01 NOTE — TELEPHONE ENCOUNTER
PRIOR AUTHORIZATION DENIED    Medication: Prior authorization needed for Zolpidem Tartrate ER 6.25mg tabs-[place] DENIED     Denial Date: 9/1/2022    Denial Rational: Insurance states that patient must tried/failed 2 other drugs:  Belsomra, Dayvigo, Doxepin (3 mg, 6 mg). Ramelteon, Temazepam (15 mg, 30 mg), Trazodone.         Appeal Information:

## 2022-09-01 NOTE — TELEPHONE ENCOUNTER
Central Prior Authorization Team   Phone: [phone]    PA Initiation    Medication: Prior authorization needed for Zolpidem Tartrate ER 6.25mg tabs  Insurance Company: [employer] [place] - Phone [phone] Fax [phone]  Pharmacy Filling the Rx: [facility] - [place], [place] - [address] [place] [address]  Filling Pharmacy Phone: [phone]  Filling Pharmacy Fax: [phone]  Start Date: 9/1/2022

## 2022-09-01 NOTE — TELEPHONE ENCOUNTER
Prior Authorization Retail Medication Request    Medication/Dose: Prior authorization needed for Zolpidem Tartrate ER 6.25mg tabs  ICD code (if different than what is on RX):    Previously Tried and Failed:    Rationale:      Insurance Name:  [facility]  Insurance ID:  [id number]      Pharmacy Information (if different than what is on RX)  Name:    Phone:

## 2022-09-01 NOTE — Clinical Note
Hi there! Pt seen today. Pt seen by neurology. They are monitoring for suspected mild PD at this point-no tx right now. I have referred for neuropsychological testing. Working on sleep right now. I/we may need to consider sleep med referral to r/o possibility of REM behavior sleep disorder if she does have PD (although not sure current line of tx would change much). Let me know if you have questions or concerns. Thanks!

## 2022-09-01 NOTE — Clinical Note
Please call this patient to get them scheduled for a follow-up visit in 3-4 weeks. Please schedule with me and the [facility]. Thanks!

## 2022-09-02 ENCOUNTER — MYC MEDICAL ADVICE (OUTPATIENT)
Dept: PSYCHIATRY | Facility: CLINIC | Age: 67
End: 2022-09-02

## 2022-09-02 DIAGNOSIS — G47.00 INSOMNIA, UNSPECIFIED TYPE: ICD-10-CM

## 2022-09-07 RX ORDER — ZOLPIDEM TARTRATE 6.25 MG/1
6.25 TABLET, FILM COATED, EXTENDED RELEASE ORAL
Qty: 30 TABLET | Refills: 1 | Status: SHIPPED | OUTPATIENT
Start: 2022-09-29 | End: 2022-10-03 | Stop reason: ALTCHOICE

## 2022-09-13 ENCOUNTER — THERAPY VISIT (OUTPATIENT)
Dept: PHYSICAL THERAPY | Facility: CLINIC | Age: 67
End: 2022-09-13
Payer: COMMERCIAL

## 2022-09-13 DIAGNOSIS — M25.562 CHRONIC PAIN OF LEFT KNEE: Primary | ICD-10-CM

## 2022-09-13 DIAGNOSIS — G89.29 CHRONIC PAIN OF LEFT KNEE: Primary | ICD-10-CM

## 2022-09-13 PROCEDURE — 97110 THERAPEUTIC EXERCISES: CPT | Mod: GP | Performed by: PHYSICAL THERAPIST

## 2022-09-13 ASSESSMENT — ACTIVITIES OF DAILY LIVING (ADL)
HOW_WOULD_YOU_RATE_THE_OVERALL_FUNCTION_OF_YOUR_KNEE_DURING_YOUR_USUAL_DAILY_ACTIVITIES?: NORMAL
PAIN: I DO NOT HAVE THE SYMPTOM
STIFFNESS: I DO NOT HAVE THE SYMPTOM
GIVING WAY, BUCKLING OR SHIFTING OF KNEE: I DO NOT HAVE THE SYMPTOM
KNEE_ACTIVITY_OF_DAILY_LIVING_SUM: 70
AS_A_RESULT_OF_YOUR_KNEE_INJURY,_HOW_WOULD_YOU_RATE_YOUR_CURRENT_LEVEL_OF_DAILY_ACTIVITY?: NORMAL
WALK: ACTIVITY IS NOT DIFFICULT
KNEEL ON THE FRONT OF YOUR KNEE: ACTIVITY IS NOT DIFFICULT
STAND: ACTIVITY IS NOT DIFFICULT
SIT WITH YOUR KNEE BENT: ACTIVITY IS NOT DIFFICULT
HOW_WOULD_YOU_RATE_THE_CURRENT_FUNCTION_OF_YOUR_KNEE_DURING_YOUR_USUAL_DAILY_ACTIVITIES_ON_A_SCALE_FROM_0_TO_100_WITH_100_BEING_YOUR_LEVEL_OF_KNEE_FUNCTION_PRIOR_TO_YOUR_INJURY_AND_0_BEING_THE_INABILITY_TO_PERFORM_ANY_OF_YOUR_USUAL_DAILY_ACTIVITIES?: 100
SQUAT: ACTIVITY IS NOT DIFFICULT
GO DOWN STAIRS: ACTIVITY IS NOT DIFFICULT
KNEE_ACTIVITY_OF_DAILY_LIVING_SCORE: 100
SWELLING: I DO NOT HAVE THE SYMPTOM
WEAKNESS: I DO NOT HAVE THE SYMPTOM
RAW_SCORE: 70
GO UP STAIRS: ACTIVITY IS NOT DIFFICULT
RISE FROM A CHAIR: ACTIVITY IS NOT DIFFICULT
LIMPING: I DO NOT HAVE THE SYMPTOM

## 2022-09-13 NOTE — PROGRESS NOTES
DISCHARGE REPORT    Progress reporting period is from IE to 9/13/22.       SUBJECTIVE  Subjective: Pt reports that she is walking more lately. Wlaking up to 20 minutes with minimal pain.  Pt has attended 3 visits of PT. Feels comfortable continuing with HEP independently at this time.    Current Pain level: 2/10.     Previous pain level was  5/10 Initial Pain level: 0/10.   Changes in function:  Yes (See Goal flowsheet attached for changes in current functional level)  Adverse reaction to treatment or activity: None    OBJECTIVE  Changes noted in objective findings:  Yes,   Knee extension lacking 10 deg, Knee flexion >120 deg.   Quad: 3+/5 fareed improved quad set from IE.         ASSESSMENT/PLAN  Updated problem list and treatment plan: Diagnosis 1: L knee OA  Pain -  hot/cold therapy, US, manual therapy, splint/taping/bracing/orthotics and home program  Decreased ROM/flexibility HEP  Decreased joint mobility HEP  Decreased strength -HEP  Inflammation - cold therapy  Impaired gait - home program  STG/LTGs have been met or progress has been made towards goals:  Yes (See Goal flow sheet completed today.)  Assessment of Progress: The patient has met all of their long term goals.  Self Management Plans:  Patient is independent in a home treatment program.  I have re-evaluated this patient and find that the nature, scope, duration and intensity of the therapy is appropriate for the medical condition of the patient.  Jolynn continues to require the following intervention to meet STG and LTG's:  PT intervention is no longer required to meet STG/LTG.    Recommendations:  This patient is ready to be discharged from therapy and continue their home treatment program.    Please refer to the daily flowsheet for treatment today, total treatment time and time spent performing 1:1 timed codes.

## 2022-09-27 ENCOUNTER — VIRTUAL VISIT (OUTPATIENT)
Dept: BEHAVIORAL HEALTH | Facility: CLINIC | Age: 67
End: 2022-09-27
Payer: COMMERCIAL

## 2022-09-27 DIAGNOSIS — F41.1 GENERALIZED ANXIETY DISORDER: ICD-10-CM

## 2022-09-27 PROCEDURE — 90791 PSYCH DIAGNOSTIC EVALUATION: CPT | Mod: 95 | Performed by: SOCIAL WORKER

## 2022-09-27 ASSESSMENT — ANXIETY QUESTIONNAIRES
GAD7 TOTAL SCORE: 9
2. NOT BEING ABLE TO STOP OR CONTROL WORRYING: NEARLY EVERY DAY
1. FEELING NERVOUS, ANXIOUS, OR ON EDGE: NEARLY EVERY DAY
IF YOU CHECKED OFF ANY PROBLEMS ON THIS QUESTIONNAIRE, HOW DIFFICULT HAVE THESE PROBLEMS MADE IT FOR YOU TO DO YOUR WORK, TAKE CARE OF THINGS AT HOME, OR GET ALONG WITH OTHER PEOPLE: VERY DIFFICULT
GAD7 TOTAL SCORE: 9
7. FEELING AFRAID AS IF SOMETHING AWFUL MIGHT HAPPEN: NOT AT ALL
7. FEELING AFRAID AS IF SOMETHING AWFUL MIGHT HAPPEN: NOT AT ALL
8. IF YOU CHECKED OFF ANY PROBLEMS, HOW DIFFICULT HAVE THESE MADE IT FOR YOU TO DO YOUR WORK, TAKE CARE OF THINGS AT HOME, OR GET ALONG WITH OTHER PEOPLE?: VERY DIFFICULT
3. WORRYING TOO MUCH ABOUT DIFFERENT THINGS: NEARLY EVERY DAY
4. TROUBLE RELAXING: NOT AT ALL
5. BEING SO RESTLESS THAT IT IS HARD TO SIT STILL: NOT AT ALL
6. BECOMING EASILY ANNOYED OR IRRITABLE: NOT AT ALL
GAD7 TOTAL SCORE: 9

## 2022-09-27 ASSESSMENT — COLUMBIA-SUICIDE SEVERITY RATING SCALE - C-SSRS
6. HAVE YOU EVER DONE ANYTHING, STARTED TO DO ANYTHING, OR PREPARED TO DO ANYTHING TO END YOUR LIFE?: NO
TOTAL  NUMBER OF INTERRUPTED ATTEMPTS LIFETIME: NO
1. HAVE YOU WISHED YOU WERE DEAD OR WISHED YOU COULD GO TO SLEEP AND NOT WAKE UP?: YES
ATTEMPT LIFETIME: NO
REASONS FOR IDEATION PAST MONTH: MOSTLY TO END OR STOP THE PAIN (YOU COULDN'T GO ON LIVING WITH THE PAIN OR HOW YOU WERE FEELING)
REASONS FOR IDEATION LIFETIME: MOSTLY TO END OR STOP THE PAIN (YOU COULDN'T GO ON LIVING WITH THE PAIN OR HOW YOU WERE FEELING)
2. HAVE YOU ACTUALLY HAD ANY THOUGHTS OF KILLING YOURSELF?: NO
TOTAL  NUMBER OF ABORTED OR SELF INTERRUPTED ATTEMPTS LIFETIME: NO
1. IN THE PAST MONTH, HAVE YOU WISHED YOU WERE DEAD OR WISHED YOU COULD GO TO SLEEP AND NOT WAKE UP?: NO

## 2022-09-27 ASSESSMENT — PATIENT HEALTH QUESTIONNAIRE - PHQ9
SUM OF ALL RESPONSES TO PHQ QUESTIONS 1-9: 6
10. IF YOU CHECKED OFF ANY PROBLEMS, HOW DIFFICULT HAVE THESE PROBLEMS MADE IT FOR YOU TO DO YOUR WORK, TAKE CARE OF THINGS AT HOME, OR GET ALONG WITH OTHER PEOPLE: VERY DIFFICULT
SUM OF ALL RESPONSES TO PHQ QUESTIONS 1-9: 6

## 2022-09-27 NOTE — PROGRESS NOTES
"    [facility]  Provider Name:  Erika Francisco     Credentials:  MSW LICSW    PATIENT'S NAME: Jolynn Haji  PREFERRED NAME: Azalea  PRONOUNS: she/her/hers     MRN: [id number]  : 1955  ADDRESS: [address] [place] [address]  ACCT. NUMBER:  [id number]  DATE OF SERVICE: 22  START TIME: 1:30pm  END TIME: 2:06pm  PREFERRED PHONE: [phone]  May we leave a program related message: Yes  SERVICE MODALITY:  Video Visit:      Provider verified identity through the following two step process.  Patient provided:  Patient photo and Patient     Telemedicine Visit: The patient's condition can be safely assessed and treated via synchronous audio and visual telemedicine encounter.      Reason for Telemedicine Visit: Patient has requested telehealth visit    Originating Site (Patient Location): Patient's home    Distant Site (Provider Location): [facility]    Consent:  The patient/guardian has verbally consented to: the potential risks and benefits of telemedicine (video visit) versus in person care; bill my insurance or make self-payment for services provided; and responsibility for payment of non-covered services.     Patient would like the video invitation sent by:  My Chart    Mode of Communication:  Video Conference via Amwell    As the provider I attest to compliance with applicable laws and regulations related to telemedicine.    UNIVERSAL ADULT Mental Health DIAGNOSTIC ASSESSMENT    Identifying Information:  Patient is a 67 year old,  individual. Patient was referred for an assessment by primary care clinic. Patient attended the session alone.    Chief Complaint:   The reason for seeking services at this time is: \"Anxiety and depression\".  The problem(s) began 2022.    Met with pt for initial assessment. Reports several years ago she had a significant head injury which resulted in her inability " to sleep. She reports anxiety followed shortly after. Since April of this year she has been experiencing more depressive sx. Her anxiety is mostly around wanting to feel better from her anxiety and not having positive results. Some of the depressive sx she has been having is poor appetite, lack of interest, struggle with focus, low energy, and has had on very rare occasions fleeting thoughts of wishing she were dead to end the pain of depression. Her anxiety sx often are in the form of nervousness, restlessness, and rumination. She used to have panic attacks each morning, however this has subsided for several months now. She has not attempted therapy before. She would like to gain skills relating to managing anxiety. She currently is working with Dr. Pompa for medications.     Patient has attempted to resolve these concerns in the past through meds.    Social/Family History:  Patient reported they grew up [place], [place]. They were raised by biological parents  . Parents were always together. Patient reported that their childhood was happy. She has a sister and brother.  Patient described their current relationships with family of origin as very close with siblings today.     The patient describes their cultural background as . Cultural influences and impact on patient's life structure, values, norms, and healthcare: [place]. Contextual influences on patient's health include: Individual Factors high anxiety associated with depressive sx, interfereing with ability to function as she would like. . These factors will be addressed in the Preliminary Treatment plan. Patient identified their preferred language to be English. Patient reported they does not need the assistance of an  or other support involved in therapy.     Patient reported had no significant delays in developmental tasks. Patient's highest education level was graduate school  . Patient identified the following learning problems: none  reported. Modifications will not be used to assist communication in therapy. Patient reports they are  able to understand written materials.    Patient's current relationship status is  for 43 years. Patient identified their sexual orientation as heterosexual. Patient reported having 2 child(mateo). Patient identified partner; friends as part of their support system. Patient identified the quality of these relationships as stable and meaningful,  .      Patient's current living/housing situation involves staying in own home/apartment.The immediate members of family and household include Zach, Allan, and they report that housing is stable.    Patient is currently retired. Patient reports their finances are obtained through other. Patient does not identify finances as a current stressor.      Patient reported that they have not been involved with the legal system. Patient does not report being under probation/ parole/ jurisdiction. They are not under any current court jurisdiction. .    Patient's Strengths and Limitations:  Patient identified the following strengths or resources that will help them succeed in treatment: umm / spirituality, friends / good social support, family support, insight and intelligence. Things that may interfere with the patient's success in treatment include: none identified.     Assessments:  The following assessments were completed by patient for this visit:  PHQ2:   PHQ-2 ( 1999 [employer]) 8/25/2022 8/11/2022 7/11/2022 6/8/2022 5/23/2022 5/12/2022 4/25/2022   Q1: Little interest or pleasure in doing things - 2 - - - 1 -   Q2: Feeling down, depressed or hopeless - 0 - - - 0 -   PHQ-2 Score - 2 - - - 1 -   PHQ-2 Total Score (12-17 Years)- Positive if 3 or more points; Administer PHQ-A if positive - - - - - - -   Q1: Little interest or pleasure in doing things - More than half the days - - - Several days -   Q2: Feeling down, depressed or hopeless - Not at all - - - Not at all -    PHQ-2 Score Incomplete 2 Incomplete Incomplete Incomplete 1 Incomplete     PHQ9:   PHQ-9 SCORE 5/9/2022 5/23/2022 6/8/2022 6/20/2022 7/11/2022 8/25/2022 9/27/2022   PHQ-9 Total Score - - - - - - -   PHQ-9 Total Score MyChart - 9 (Mild depression) 7 (Mild depression) - 4 (Minimal depression) 7 (Mild depression) 6 (Mild depression)   PHQ-9 Total Score 12 9 7 6 4 7 6   Some encounter information is confidential and restricted. Go to Review Flowsheets activity to see all data.     GAD2:   TIFFANY-2 5/12/2022 6/14/2022 7/23/2022 9/1/2022   Feeling nervous, anxious, or on edge 1 1 3 3   Not being able to stop or control worrying 2 1 2 1   TIFFANY-2 Total Score 3 2 5 4     GAD7:   TIFFANY-7 SCORE 5/12/2022 5/23/2022 6/8/2022 6/20/2022 7/11/2022 8/25/2022 9/27/2022   Total Score - - - - - - -   Total Score 6 (mild anxiety) 6 (mild anxiety) 6 (mild anxiety) - 5 (mild anxiety) 8 (mild anxiety) 9 (mild anxiety)   Total Score 6 6 6 3 5 8 9     CAGE-AID:   CAGE-AID Total Score 5/12/2022 5/12/2022   Total Score 0 0   Total Score MyChart - 0 (A total score of 2 or greater is considered clinically significant)   Some encounter information is confidential and restricted. Go to Review FlowsSiteBrand activity to see all data.     PROMIS 10-Global Health (all questions and answers displayed):   PROMIS 10 5/12/2022 5/12/2022 7/1/2022 9/27/2022 9/27/2022   In general, would you say your health is: - Good Very good - Very good   In general, would you say your quality of life is: - Poor Very good - Good   In general, how would you rate your physical health? - Excellent Very good - Excellent   In general, how would you rate your mental health, including your mood and your ability to think? - Fair Excellent - Fair   In general, how would you rate your satisfaction with your social activities and relationships? - Good Excellent - Very good   In general, please rate how well you carry out your usual social activities and roles - Fair Good - Fair   To what  extent are you able to carry out your everyday physical activities such as walking, climbing stairs, carrying groceries, or moving a chair? - Mostly A little - Mostly   How often have you been bothered by emotional problems such as feeling anxious, depressed or irritable? - Sometimes Sometimes - Always   How would you rate your fatigue on average? - Moderate None - Moderate   How would you rate your pain on average?   0 = No Pain  to  10 = Worst Imaginable Pain - 0 8 - 0   In general, would you say your health is: 3 3 4 4 4   In general, would you say your quality of life is: 1 1 4 3 3   In general, how would you rate your physical health? 5 5 4 5 5   In general, how would you rate your mental health, including your mood and your ability to think? 2 2 5 2 2   In general, how would you rate your satisfaction with your social activities and relationships? 3 3 5 4 4   In general, please rate how well you carry out your usual social activities and roles. (This includes activities at home, at work and in your community, and responsibilities as a parent, child, spouse, employee, friend, etc.) 2 2 3 2 2   To what extent are you able to carry out your everyday physical activities such as walking, climbing stairs, carrying groceries, or moving a chair? 4 4 2 4 4   In the past 7 days, how often have you been bothered by emotional problems such as feeling anxious, depressed, or irritable? 3 3 3 5 5   In the past 7 days, how would you rate your fatigue on average? 3 3 1 3 3   In the past 7 days, how would you rate your pain on average, where 0 means no pain, and 10 means worst imaginable pain? 0 0 8 0 0   Global Mental Health Score 9 9 17 10 10   Global Physical Health Score 17 17 13 17 17   PROMIS TOTAL - SUBSCORES 26 26 30 27 27   Some recent data might be hidden     PROMIS 10-Global Health (only subscores and total score):   PROMIS-10 Scores Only 5/12/2022 5/12/2022 7/1/2022 9/27/2022 9/27/2022   Global Mental Health Score 9  9 17 10 10   Global Physical Health Score 17 17 13 17 17   PROMIS TOTAL - SUBSCORES 26 26 30 27 27     [place] Suicide Severity Rating Scale (Lifetime/Recent)  [place] Suicide Severity Rating (Lifetime/Recent) 5/12/2022 9/27/2022   1. Wish to be Dead (Lifetime) 0 1   Wish to be Dead Description (Lifetime) - fleeting thought of wishing she were dead   1. Wish to be Dead (Past 1 Month) - 0   2. Non-Specific Active Suicidal Thoughts (Lifetime) 0 0   Most Severe Ideation Rating (Lifetime) - 1   Most Severe Ideation Rating (Past 1 Month) - 1   Frequency (Lifetime) - 1   Frequency (Past 1 Month) - 1   Duration (Lifetime) - 1   Duration (Past 1 Month) - 1   Controllability (Lifetime) - 1   Controllability (Past 1 Month) - 1   Deterrents (Lifetime) - 1   Deterrents (Past 1 Month) - 1   Reasons for Ideation (Lifetime) - 4   Reasons for Ideation (Past 1 Month) - 4   Actual Attempt (Lifetime) 0 0   Has subject engaged in non-suicidal self-injurious behavior? (Lifetime) 0 0   Interrupted Attempts (Lifetime) 0 0   Aborted or Self-Interrupted Attempt (Lifetime) 0 0   Preparatory Acts or Behavior (Lifetime) 0 0   Calculated C-SSRS Risk Score (Lifetime/Recent) No Risk Indicated No Risk Indicated       Personal and Family Medical History:  Patient does report a family history of mental health concerns.  Patient reports family history includes Blood Disease in her mother; Cancer in her maternal uncle and maternal uncle; Diabetes in her daughter, mother, and son; Heart Disease (age of onset: 62) in her father; Hypertension in her father and mother; Thyroid Disease in her mother..     Patient does report Mental Health Diagnosis and/or Treatment.  Patient Patient reported the following previous diagnoses which include(s): an anxiety disorder; depression .  Patient reported symptoms began several years ago.  Patient has received mental health services in the past:  other Medication from primary doctor.  Psychiatric Hospitalizations:  none.  Patient denies a history of civil commitment.  Currently, patient none  receiving other mental health services.  These include psychiatry with Dr. Pompa.  Next appointment: This month.         Patient has had a physical exam to rule out medical causes for current symptoms. Date of last physical exam was within the past year. Symptoms have developed since last physical exam and client was encouraged to follow up with PCP.  The patient has a [place] Primary Care Provider, who is named Helga Ibarra Patient reports no current medical concerns. Patient denies any issues with pain. There are not significant appetite / nutritional concerns / weight changes. Patient does report a history of head injury / trauma / cognitive impairment.      Patient reports current meds as:   Current Outpatient Medications   Medication     clindamycin (CLINDAGEL) 1 % external gel     lactobacillus rhamnosus, GG, (CULTURELL) capsule     lisinopril (ZESTRIL) 2.5 MG tablet     ondansetron (ZOFRAN ODT) 4 MG ODT tab     venlafaxine (EFFEXOR XR) 75 MG 24 hr capsule     [START ON 9/29/2022] zolpidem ER (AMBIEN CR) 6.25 MG CR tablet     Current Facility-Administered Medications   Medication     lidocaine 1 % injection 3 mL     lidocaine 1 % injection 4 mL     triamcinolone (KENALOG-40) injection 40 mg       Medication Adherence:  Patient reports taking.  taking prescribed medications as prescribed.    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-induced Parkinsonism      Fosamax Diarrhea     Diarrhea, stomach cramps, jaw pain      Augmentin Itching and Rash       Medical History:    Past Medical History:   Diagnosis Date     Abnormal glandular Papanicolaou smear of cervix- ASCUS in 2000 12/5/2003    ASC H- 2000     Basal cell carcinoma of leg, right 05/2016    Dr Rogers     Colon polyps 05/2018    tubular adenoam - due 5 yrs - 2 mm     Concussion without  loss of consciousness 4/12/2017     Hidradenitis     left  groin     HSIL (high grade squamous intraepithelial lesion) on Pap smear of cervix 7/5/2000    Normal paps from 2001 to 2016/cc     Hypertension goal BP (blood pressure) < 140/90      OA (osteoarthritis) of knee     moderate     Postmenopausal since 7/2008      Shingles 7/23/2010    left      Squamous cell carcinoma of skin, unspecified      Synovial cyst of popliteal space          Current Mental Status Exam:   Appearance:  Appropriate    Eye Contact:  Good   Psychomotor:  Normal       Gait / station:  no problem  Attitude / Demeanor: Cooperative   Speech      Rate / Production: Normal/ Responsive      Volume:  Normal  volume      Language:  intact  Mood:   Anxious  Depressed   Affect:   Appropriate    Thought Content: Clear   Thought Process: Coherent  Logical       Associations: No loosening of associations  Insight:   Fair   Judgment:  Intact   Orientation:  All  Attention/concentration: Fair      Substance Use:  Patient did report a family history of substance use concerns; see medical history section for details. Brother alcoholic Patient has not received chemical dependency treatment in the past.  Patient has not ever been to detox.      Patient is not currently receiving any chemical dependency treatment.           Substance History of use Age of first use Date of last use     Pattern and duration of use (include amounts and frequency)   Alcohol never used       REPORTS SUBSTANCE USE: N/A   Cannabis   never used     REPORTS SUBSTANCE USE: N/A     Amphetamines   never used     REPORTS SUBSTANCE USE: N/A   Cocaine/crack    never used       REPORTS SUBSTANCE USE: N/A   Hallucinogens never used         REPORTS SUBSTANCE USE: N/A   Inhalants never used         REPORTS SUBSTANCE USE: N/A   Heroin never used         REPORTS SUBSTANCE USE: N/A   Other Opiates never used     REPORTS SUBSTANCE USE: N/A   Benzodiazepine   never used     REPORTS SUBSTANCE USE:  N/A   Barbiturates never used     REPORTS SUBSTANCE USE: N/A   Over the counter meds never used     REPORTS SUBSTANCE USE: N/A   Caffeine currently use 17   REPORTS SUBSTANCE USE: reports using substance 1 times per day and has 1 cup of coffee at a time.   Patient reports heaviest use was years ago.   Nicotine  never used     REPORTS SUBSTANCE USE: N/A   Other substances not listed above:  Identify:  never used     REPORTS SUBSTANCE USE: N/A     Patient reported the following problems as a result of their substance use: no problems, not applicable.    Substance Use: No symptoms    Based on the negative CAGE score and clinical interview there  are not indications of drug or alcohol abuse.      Significant Losses / Trauma / Abuse / Neglect Issues:   Patient did not  serve in the .  There are indications or report of significant loss, trauma, abuse or neglect issues related to:  health scare 1 year ago.  Concerns for possible neglect are not present.     Safety Assessment:   Patient denies current homicidal ideation and behaviors.  Patient denies current self-injurious ideation and behaviors.    Patient denied risk behaviors associated with substance use.  Patient denies any high risk behaviors associated with mental health symptoms.  Patient reports the following current concerns for their personal safety: None.  Patient reports there are not firearms in the house.       There are no firearms in the home..    History of Safety Concerns:  Patient denied a history of homicidal ideation.     Patient denied a history of personal safety concerns.    Patient denied a history of assaultive behaviors.    Patient denied a history of sexual assault behaviors.     Patient denied a history of risk behaviors associated with substance use.  Patient denies any history of high risk behaviors associated with mental health symptoms.  Patient reports the following protective factors: dedication to family or friends; safe  and stable environment; regular sleep; regular physical activity; sense of belonging; purpose; commitment to well being; sense of meaning; strong sense of self worth or esteem    Risk Plan:  See Recommendations for Safety and Risk Management Plan    Review of Symptoms per patient report:  Depression: Change in sleep, Lack of interest, Change in energy level, Difficulties concentrating, Change in appetite, Ruminations, Feeling sad, down, or depressed and Frequent crying  Seema:  No Symptoms  Psychosis: No Symptoms  Anxiety: Excessive worry, Nervousness, Physical complaints, such as headaches, stomachaches, muscle tension, Sleep disturbance, Ruminations and Poor concentration  Panic:  Palpitations and Shortness of breath  Post Traumatic Stress Disorder:  No Symptoms   Eating Disorder: No Symptoms  ADD / ADHD:  No symptoms  Conduct Disorder: No symptoms  Autism Spectrum Disorder: No symptoms  Obsessive Compulsive Disorder: No Symptoms    Patient reports the following compulsive behaviors and treatment history: NA.      Diagnostic Criteria:   Generalized Anxiety Disorder  A. Excessive anxiety and worry about a number of events or activities (such as work or school performance).   B. The person finds it difficult to control the worry.  C. Select 3 or more symptoms (required for diagnosis). Only one item is required in children.   - Restlessness or feeling keyed up or on edge.    - Being easily fatigued.    - Difficulty concentrating or mind going blank.    - Irritability.    - Muscle tension.    - Sleep disturbance (difficulty falling or staying asleep, or restless unsatisfying sleep).   D. The focus of the anxiety and worry is not confined to features of an Axis I disorder.  E. The anxiety, worry, or physical symptoms cause clinically significant distress or impairment in social, occupational, or other important areas of functioning.   F. The disturbance is not due to the direct physiological effects of a substance  (e.g., a drug of abuse, a medication) or a general medical condition (e.g., hyperthyroidism) and does not occur exclusively during a Mood Disorder, a Psychotic Disorder, or a Pervasive Developmental Disorder.      Functional Status:  Patient reports the following functional impairments:  home life with family and social interactions.     Nonprogrammatic care:  Patient is requesting basic services to address current mental health concerns.    Clinical Summary:  1. Reason for assessment: increase in depressive and anxiety sx  .  2. Psychosocial, Cultural and Contextual Factors: Individual Factors high anxiety associated with depressive sx, interfereing with ability to function as she would like.  .  3. Principal DSM5 Diagnoses  (Sustained by DSM5 Criteria Listed Above):   300.02 (F41.1) Generalized Anxiety Disorder.  4. Other Diagnoses that is relevant to services:   NA.  5. Provisional Diagnosis:  296.21 (F32.0) Major Depressive Disorder, Single Episode, Mild _ as evidenced by See above .  6. Prognosis: Expect Improvement.  7. Likely consequences of symptoms if not treated: Continued or worsening of sx.  8. Client strengths include:  caring, educated, empathetic, goal-focused, insightful, intelligent and open to learning .     Recommendations:     1. Plan for Safety and Risk Management:   Safety and Risk: Recommended that patient call 911 or go to the local ED should there be a change in any of these risk factors..          Report to child / adult protection services was NA.     2. Patient's identified mental health concerns with a cultural influence will be addressed by therapy and medication management.     3. Initial Treatment will focus on:    Depressed Mood - see above  Anxiety - see above.     4. Resources/Service Plan:    services are not indicated.   Modifications to assist communication are not indicated.   Additional disability accommodations are not indicated.      5.  Collaboration:   Collaboration / coordination of treatment will be initiated with the following support professionals: primary care physician and psychiatry.      6.  Referrals:   The following referral(s) will be initiated: na. Next Scheduled Appointment: next week.     A Release of Information has been obtained for the following: na.     Emergency Contact in chart.     7. FARIDA:    FARIDA:  Discussed the general effects of drugs and alcohol on health and well-being. Provider gave patient printed information about the effects of chemical use on their health and well being. Recommendations:  abstain .     8. Records:   These were not available for review at time of assessment.   Information in this assessment was obtained from the medical record and provided by patient who is a good historian. Patient will have open access to their mental health medical record.        Provider Name/ Credentials:  Erika STARK [facility]  September 27, 2022          Answers for HPI/ROS submitted by the patient on 9/27/2022  If you checked off any problems, how difficult have these problems made it for you to do your work, take care of things at home, or get along with other people?: Very difficult  PHQ9 TOTAL SCORE: 6  TIFFANY 7 TOTAL SCORE: 9

## 2022-09-29 ENCOUNTER — OFFICE VISIT (OUTPATIENT)
Dept: FAMILY MEDICINE | Facility: CLINIC | Age: 67
End: 2022-09-29
Payer: COMMERCIAL

## 2022-09-29 VITALS
OXYGEN SATURATION: 99 % | HEART RATE: 103 BPM | HEIGHT: 68 IN | BODY MASS INDEX: 19.46 KG/M2 | TEMPERATURE: 97.6 F | SYSTOLIC BLOOD PRESSURE: 136 MMHG | RESPIRATION RATE: 16 BRPM | DIASTOLIC BLOOD PRESSURE: 78 MMHG | WEIGHT: 128.4 LBS

## 2022-09-29 DIAGNOSIS — F43.23 ADJUSTMENT DISORDER WITH MIXED ANXIETY AND DEPRESSED MOOD: ICD-10-CM

## 2022-09-29 DIAGNOSIS — R41.843: Primary | ICD-10-CM

## 2022-09-29 DIAGNOSIS — I10 ESSENTIAL HYPERTENSION WITH GOAL BLOOD PRESSURE LESS THAN 140/90: ICD-10-CM

## 2022-09-29 PROCEDURE — 99214 OFFICE O/P EST MOD 30 MIN: CPT | Performed by: FAMILY MEDICINE

## 2022-09-29 NOTE — PATIENT INSTRUCTIONS
[facility]  [address] [place] [address]  Office: [phone]   Fax:    [phone]     Return in 4 days (on 10/3/2022) for for knee injection with Dr Ibarra and in 1 month for follow up on mood and knee per patient request.     Ask Dr. Pompa about pharmocogenomics - or Genesight-like testing to perhaps aid in medication tailoring.     Try to get back to walking, if possible.  Try to get back to [employer] , if possible - for stationary bike or nonweightbearing exercise to help with your brain chemistry as well as sleep.      Thank you so much or choosing [facility]  for your Health Care. It was a pleasure seeing you at your visit today! Please contact us with any questions or concerns you may have.                   Helga Ibarra MD                              To reach your [facility] care team after hours call:   [phone]    PLEASE NOTE OUR HOURS HAVE CHANGED secondary to COVID-19 coronavirus pandemic, as we are trying to minimize patient exposure to the virus,  which is now widespread in the [facility].  These hours may change with very little notice.  We apologize for any inconvenience.       Our current clinic hours are:          Monday- Thursday   7:00am - 6:00pm  in person.      Friday  7:00am- 5:00pm                       Saturday and Sunday : Closed to in person and virtual visits        We have telephone and virtual visit times available between    7:00am - 6pm on Monday-Friday as well.                                                Phone:  [phone]      Our pharmacy hours: Monday through Friday 8:00am to 5:00pm                        Saturday - 9:00 am to 12 noon       Sunday : Closed.              Phone:  [phone]              ###  Please note: at this time we are not accepting any walk-in visits. ###      There is also information available at our web site:  [email or web address]    If  your provider ordered any lab tests and you do not receive the results within 10 business days, please call the clinic.    If you need a medication refill please contact your pharmacy.  Please allow 3 business days for your refill to be completed.    Our clinic offers telephone visits and e visits.  Please ask one of your team members to explain more.      Use [employer] (secure email communication and access to your chart) to send your primary care provider a message or make an appointment. Ask someone on your Team how to sign up for [employer].

## 2022-09-29 NOTE — PROGRESS NOTES
Assessment & Plan       ICD-10-CM    1. 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 Adjustment disorder with mixed anxiety and depressed mood- mild-moderate  at this time  F43.23       3. Essential hypertension with goal blood pressure less than 140/90  I10            Return in 4 days (on 10/3/2022) for for knee injection with Dr Ibarra and in 1 month for follow up on mood and knee per patient request.     Ask Dr. Pompa about pharmocogenomics - or Genesight-like testing to perhaps aid in medication tailoring.     Try to get back to walking, if possible.  Try to get back to [employer] , if possible - for stationary bike or nonweightbearing exercise to help with your brain chemistry as well as sleep.    Prescription drug management  42 minutes spent on the date of the encounter doing chart review, history and exam, documentation and further activities per the note         MEDICATIONS:        - Continue other medications without change  Regular exercise  See Patient Instructions    Return in 4 days (on 10/3/2022) for for knee injection with Dr Ibarra and in 1 month for follow up on mood and knee .           Helga Ibarra MD  [facility] [place]        Subjective :   Azalea is a 67 year old accompanied by her spouse, Zach, presenting for the following health issues:  Recheck Medication  recheck on depression and sleep.   Psychiatry changed her meds - is sleeping better.    Changed from lexapro to effexor xr 75mg 8/11/2022.   Getting very tired during the day - in the late afternoon especially.   Takes ambien still right before bed and falls asleep and stays asleep well, but does awaken just once to urinate and now able to get back to sleep.  Never awakens feeling rested. Feels a bit washed out during the day.  Would like more energy during the day.       Has appt with psychiatry on Monday  10/3/2022.    Feels a bit more cloudy/ slower with decision - making - even in the grocery store.  Feels her thoughts are a bit scattered and easily distracted - difficulty with task completion.       had a bit of health scare about a week ago. That ended up being fine and he's been cleared to return in 1 years.    Had a CT scan that showed lesions on pancreas and liver and lung  -lung lesions  showed some improvement after treating for fungal infection.  Pancreatic and liver biopsies.     Was walking her  Dog Casper regularly for exercise. left knee has really been hurting again.  Feels like it is  Time for another cortisone shot in her knee for her osteoarthritis. .  Last shot was in July - Dr. Quiroz.  - 7/1/2022.  Needs appt for this coming up - done.   Reviewed pt's xrays.     Got a therapist Erika Estrada - seeing her weekly either via video or by telephone.  Very happy about that.     Felt like mirtazepine worked well for her mood, but not as much for her sleep.       Went shopping at [employer] with her daughter , Malini and her granddaughter, Aggie,  and that was a lot of fun for her.  Went to [employer] in her neighborhood with 13-14 other women and that was really fun for her.  She was so happy that she went.  She's been seeing her friends a bit more and having more people over than previous.  She doesn't feel as comfortable driving - doesn't feel comfortable making split second decisions quite yet.           History of Present Illness       Mental Health Follow-up:  Patient presents to follow-up on Depression & Anxiety.Patient's depression since last visit has been:  Medium  The patient is not having other symptoms associated with depression.  Patient's anxiety since last visit has been:  No change  The patient is not having other symptoms associated with anxiety.  Any significant life events: health concerns  Patient is feeling anxious or having panic attacks.  Patient has no concerns about alcohol  or drug use.    She eats 2-3 servings of fruits and vegetables daily.She consumes 0 sweetened beverage(s) daily.She exercises with enough effort to increase her heart rate 10 to 19 minutes per day.  She exercises with enough effort to increase her heart rate 3 or less days per week.   She is taking medications regularly.    Today's PHQ-9         PHQ-9 Total Score: 7    PHQ-9 Q9 Thoughts of better off dead/self-harm past 2 weeks :   Not at all    How difficult have these problems made it for you to do your work, take care of things at home, or get along with other people: Somewhat difficult         Social History     Tobacco Use     Smoking status: Never Smoker     Smokeless tobacco: Never Used   Substance Use Topics     Alcohol use: Yes     Alcohol/week: 0.0 - 1.0 standard drinks     Comment: 2 per month     Drug use: No     Comment: no herbal meds either     TIFFANY-7 SCORE 7/11/2022 8/25/2022 9/27/2022   Total Score - - -   Total Score 5 (mild anxiety) 8 (mild anxiety) 9 (mild anxiety)   Total Score 5 8 9     PHQ 8/25/2022 9/27/2022 9/29/2022   PHQ-9 Total Score 7 6 7   Q9: Thoughts of better off dead/self-harm past 2 weeks Not at all Not at all Not at all   Some encounter information is confidential and restricted. Go to [employer] activity to see all data.     Last PHQ-9 9/29/2022   1.  Little interest or pleasure in doing things 1   2.  Feeling down, depressed, or hopeless 1   3.  Trouble falling or staying asleep, or sleeping too much 2   4.  Feeling tired or having little energy 3   5.  Poor appetite or overeating 0   6.  Feeling bad about yourself 0   7.  Trouble concentrating 0   8.  Moving slowly or restless 0   Q9: Thoughts of better off dead/self-harm past 2 weeks 0   PHQ-9 Total Score 7   Difficulty at work, home, or with people -   Some encounter information is confidential and restricted. Go to [employer] activity to see all data.     TIFFANY-7  9/27/2022   1. Feeling nervous, anxious, or on  edge 3   2. Not being able to stop or control worrying 3   3. Worrying too much about different things 3   4. Trouble relaxing 0   5. Being so restless that it is hard to sit still 0   6. Becoming easily annoyed or irritable 0   7. Feeling afraid, as if something awful might happen 0   TIFFANY-7 Total Score 9   If you checked any problems, how difficult have they made it for you to do your work, take care of things at home, or get along with other people? Very difficult     AST   Date Value Ref Range Status   04/25/2022 11 0 - 45 U/L Final   01/13/2021 13 0 - 45 U/L Final     Lab Results   Component Value Date    ALT 21 04/25/2022    ALT 17 01/13/2021       Patient Active Problem List   Diagnosis     Essential hypertension with goal blood pressure less than 140/90     Hidradenitis     Localized osteoarthritis of hand     Synovial cyst of popliteal space     DERMATOPHYTOSIS OF NAIL- toenails      Lipidosis     Osteopenia     Postmenopausal     NUMBNESS AND TINGLING OF FOOT - bilateral -mild      Numbness of feet- distal feet R>L      Stress incontinence, female - mild     Cough     Acute bronchitis- recurrent- ? related to mold in school building- pt has since retired and no further bronchitis     Rosacea     History of migraine headaches- assoc. with nausea/vomiting - resolved around menopause - were  premenstrual      Dupuytren's disease of palm - right 4th digit flexor tendon     Sun-damaged skin     Seasonal allergic rhinitis     Basal cell carcinoma of leg, right     Adjustment disorder with mixed anxiety and depressed mood- mild-moderate  at this time     Family history of celiac disease     Generalized anxiety disorder     Insomnia, unspecified type     Chronic constipation     Raynaud's phenomenon without gangrene     Other secondary osteoarthritis of left knee     Primary localized osteoarthrosis of left lower leg     Hyperlipidemia LDL goal <130     Basal cell carcinoma     Colon polyps     Screening for  "cervical cancer     Adjustment insomnia     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        Current Outpatient Medications   Medication Sig Dispense Refill     clindamycin (CLINDAGEL) 1 % external gel Apply topically daily 60 g 11     lactobacillus rhamnosus, GG, (CULTURELL) capsule Take 1 capsule by mouth daily       lisinopril (ZESTRIL) 2.5 MG tablet Take 1 tablet (2.5 mg) by mouth daily 90 tablet 3     ondansetron (ZOFRAN ODT) 4 MG ODT tab Take 1 tablet (4 mg) by mouth every 8 hours as needed for nausea 30 tablet 0     venlafaxine (EFFEXOR XR) 75 MG 24 hr capsule Take 1 capsule (75 mg) by mouth daily 90 capsule 0     venlafaxine (EFFEXOR XR) 37.5 MG 24 hr capsule Take 1 capsule (37.5 mg) by mouth daily Take WITH 75 mg cap for total daily dose 112.5 mg. 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      Objective    /78   Pulse 103   Temp 97.6  F (36.4  C) (Tympanic)   Resp 16   Ht 1.715 m (5' 7.5\")   Wt 58.2 kg (128 lb 6.4 oz)   LMP 06/28/2001   SpO2 99%   BMI 19.81 kg/m    Body mass index is 19.81 kg/m .  Physical Exam   GENERAL: healthy, alert and no distress  HENT: ear canals and TM's normal, nose and mouth without ulcers or lesions  NECK: no adenopathy, no asymmetry, masses, or scars and thyroid normal to palpation  RESP: lungs clear to auscultation - no rales, rhonchi or wheezes  CV: regular rate and rhythm, normal S1 S2, no S3 or S4, no murmur, click or " rub, no peripheral edema and peripheral pulses strong  ABDOMEN: soft, nontender, no hepatosplenomegaly, no masses and bowel sounds normal  MS: no gross musculoskeletal defects noted, no edema  SKIN: no suspicious lesions or rashes  NEURO: Normal strength and tone, mentation intact and speech normal  PSYCH: mentation appears improved , affect brighter than previous. Alert and oriented. No acute distress. Appears well-groomed and casually dressed. Affect is  not particularly depressed. In good humor and laughs appropriately. Not particularly anxious today . No evidence of psychosis.     Office Visit on 08/05/2022   Component Date Value Ref Range Status     Case Report 08/05/2022    Final                    Value:Surgical Pathology Report                         Case: [id number]                                  Authorizing Provider:  Ligia Carbajal PA-C    Collected:           08/05/2022 10:44 AM          Ordering Location:     [facility]   Received:            08/05/2022 04:17 PM                                 [facility]                                                           Pathologist:           Rodrigo Taylor MD                                                      Specimens:   A) - Skin, left forearm                                                                             B) - Skin, right lower leg                                                                  Final Diagnosis 08/05/2022    Final                    Value:This result contains rich text formatting which cannot be displayed here.     Clinical Information 08/05/2022    Final                    Value:This result contains rich text formatting which cannot be displayed here.     Gross Description 08/05/2022    Final                    Value:This result contains rich text formatting which cannot be displayed here.     Microscopic Description 08/05/2022    Final                    Value:This result contains rich text  formatting which cannot be displayed here.     Performing Labs 08/05/2022    Final                    Value:This result contains rich text formatting which cannot be displayed here.

## 2022-10-03 ENCOUNTER — VIRTUAL VISIT (OUTPATIENT)
Dept: PSYCHIATRY | Facility: CLINIC | Age: 67
End: 2022-10-03
Payer: COMMERCIAL

## 2022-10-03 ENCOUNTER — VIRTUAL VISIT (OUTPATIENT)
Dept: BEHAVIORAL HEALTH | Facility: CLINIC | Age: 67
End: 2022-10-03
Payer: COMMERCIAL

## 2022-10-03 ENCOUNTER — OFFICE VISIT (OUTPATIENT)
Dept: FAMILY MEDICINE | Facility: CLINIC | Age: 67
End: 2022-10-03
Payer: COMMERCIAL

## 2022-10-03 ENCOUNTER — TELEPHONE (OUTPATIENT)
Dept: PSYCHIATRY | Facility: CLINIC | Age: 67
End: 2022-10-03

## 2022-10-03 VITALS
DIASTOLIC BLOOD PRESSURE: 80 MMHG | BODY MASS INDEX: 19.4 KG/M2 | SYSTOLIC BLOOD PRESSURE: 128 MMHG | OXYGEN SATURATION: 96 % | WEIGHT: 128 LBS | HEART RATE: 100 BPM | TEMPERATURE: 97 F | HEIGHT: 68 IN

## 2022-10-03 DIAGNOSIS — M17.12 PRIMARY LOCALIZED OSTEOARTHROSIS OF LEFT LOWER LEG: Primary | ICD-10-CM

## 2022-10-03 DIAGNOSIS — G21.19 DRUG-INDUCED PARKINSONISM (H): ICD-10-CM

## 2022-10-03 DIAGNOSIS — F39 MOOD DISORDER (H): ICD-10-CM

## 2022-10-03 DIAGNOSIS — Z87.820 HISTORY OF TRAUMATIC BRAIN INJURY: ICD-10-CM

## 2022-10-03 DIAGNOSIS — F41.1 GAD (GENERALIZED ANXIETY DISORDER): ICD-10-CM

## 2022-10-03 DIAGNOSIS — G47.00 INSOMNIA, UNSPECIFIED TYPE: Primary | ICD-10-CM

## 2022-10-03 DIAGNOSIS — F41.1 GENERALIZED ANXIETY DISORDER: Primary | ICD-10-CM

## 2022-10-03 PROCEDURE — 99214 OFFICE O/P EST MOD 30 MIN: CPT | Mod: 25 | Performed by: FAMILY MEDICINE

## 2022-10-03 PROCEDURE — 99214 OFFICE O/P EST MOD 30 MIN: CPT | Mod: 95 | Performed by: PSYCHIATRY & NEUROLOGY

## 2022-10-03 PROCEDURE — 20610 DRAIN/INJ JOINT/BURSA W/O US: CPT | Performed by: FAMILY MEDICINE

## 2022-10-03 PROCEDURE — 90832 PSYTX W PT 30 MINUTES: CPT | Mod: 95 | Performed by: PSYCHOLOGIST

## 2022-10-03 RX ORDER — ZOLPIDEM TARTRATE 5 MG/1
2.5-5 TABLET ORAL
Qty: 30 TABLET | Refills: 1 | Status: SHIPPED | OUTPATIENT
Start: 2022-10-03 | End: 2022-10-24 | Stop reason: ALTCHOICE

## 2022-10-03 RX ORDER — TRIAMCINOLONE ACETONIDE 40 MG/ML
40 INJECTION, SUSPENSION INTRA-ARTICULAR; INTRAMUSCULAR ONCE
Status: COMPLETED | OUTPATIENT
Start: 2022-10-03 | End: 2024-08-14

## 2022-10-03 RX ORDER — VENLAFAXINE HYDROCHLORIDE 37.5 MG/1
37.5 CAPSULE, EXTENDED RELEASE ORAL DAILY
Qty: 90 CAPSULE | Refills: 0 | Status: SHIPPED | OUTPATIENT
Start: 2022-10-03 | End: 2022-10-24 | Stop reason: DRUGHIGH

## 2022-10-03 NOTE — PROGRESS NOTES
"Jolynn Haji is a 67 year old year old who is being evaluated via a billable video visit.    How would you like to obtain your AVS? MyChart  If you are dropped from the video visit, the video invite should be resent to: Text to cell phone: see Epic  Will anyone else be joining your video visit? No     Telemedicine Visit: The patient's condition can be safely assessed and treated via synchronous audio and visual telemedicine encounter.      Reason for Telemedicine Visit: Covid-19 Pandemic    Originating Site (Patient Location): Patient's home     Distant Site (Provider Location): Provider Remote Setting    Mode of Communication:  Video Conference via [employer]    As the provider I attest to compliance with applicable laws and regulations related to telemedicine.        Outpatient Psychiatric Progress Note    Name: Jolynn Haji   : 1955                    Primary Care Provider: Helga Ibarra MD   Therapist: Just started working with someone 2022    PHQ-9 scores:  PHQ-9 SCORE 2022   PHQ-9 Total Score - - -   PHQ-9 Total Score MyChart 7 (Mild depression) 6 (Mild depression) 7 (Mild depression)   PHQ-9 Total Score 7 6 7   Some encounter information is confidential and restricted. Go to Review Flowsheets activity to see all data.       TIFFANY-7 scores:  TIFFANY-7 SCORE 2022   Total Score - - -   Total Score 5 (mild anxiety) 8 (mild anxiety) 9 (mild anxiety)   Total Score 5 8 9       Patient Identification:  Patient is a 67 year old,   White Choose not to answer female  who presents for return visit with me.  Patient is currently retired. Patient attended the phone/video session with her daughter Malini today. Patient prefers to be called: \" Azalea\".    Interim History:  I last saw Jolynn Haji for outpatient psychiatry return visit on 2022. During that appointment, we:     Change Ambien to Ambien CR (extended release version) 6.25 mg at bedtime as " "needed for sleep. Do not take with alcohol or opioid pain medication. Make sure to have 8-10 hours to devote to sleep after taking. Careful/caution for falls in middle of night.     Continue Effexor-XR/venlafaxine ER 75 mg daily for depression and anxiety.  Can try taking at bedtime.    Tomorrow, 9/2, take at lunch time for 1 day    9/3 take at dinnertime for 1 day    Then, on 9/4, start taking your dose at bedtime.      Start ondansetron/Zofran 4 mg every 8 hrs as needed for nausea    Continue to follow with neurology as they recommend.      10/3: Patient struggling some still with morning anxiety, but definitely improved compared to August.  Symptoms started to get better starting in September.  It does seem like venlafaxine has been helpful.  Patient tolerating the medication better now that she is taking it at bedtime.  Nausea has been improved.  Sleeping from 10 PM until 5 AM.  Does feel a little foggy during the day and off balance at times.  Wondering if it might be from Ambien CR.  No acute safety concerns.  No falls.  No SI.  No problematic drug or alcohol use.    Per [facility], Dr. Eran Landon, during today's team-based visit:  Reported that she continues to wake up with anxiety. Approximately 3 days a week, she has an experience of \"hitting a wall,\" feeling more fatigued, and her mind is \"scattered\" such that she goes from job to job without finishing any of them. She spoke about how she has been continuing to engage in activities \"but it takes a lot of effort sometimes.\" She acknowledged that the anxiety does get better after she gets started. She is starting therapy soon and hopes to learn more skills to manage her anxiety better.     Past Psychiatric Med Trials:  Psych Meds at Intake:  lexapro 20 mg daily  abilify 5 mg daily      Past Psych Meds:  Amitriptyline  Quetiapine  Mirtazapine - stopped \"because I was sleeping through the night\"    Psychiatric ROS:  Jolynn Haji reports mood has been: Okay  Anxiety has " been: A little better since August but still some pretty bad/tough days  Sleep has been: Sleeping 10 PM until 5 AM, a little better on Ambien CR but maybe side effects?  Seema sxs: None  Psychosis sxs: N/A  ADHD/ADD sxs: N/A  PTSD sxs: N/A  PHQ9 and GAD7 scores were reviewed today if completed.   Medication side effects: Feeling foggy during the day and off balance from Ambien CR?  No falls.  Current stressors include: Symptoms and See HPI above  Coping mechanisms and supports include: Family, Hobbies and Friends    Current medications include:   Current Outpatient Medications   Medication Sig     clindamycin (CLINDAGEL) 1 % external gel Apply topically daily     lactobacillus rhamnosus, GG, (CULTURELL) capsule Take 1 capsule by mouth daily     lisinopril (ZESTRIL) 2.5 MG tablet Take 1 tablet (2.5 mg) by mouth daily     ondansetron (ZOFRAN ODT) 4 MG ODT tab Take 1 tablet (4 mg) by mouth every 8 hours as needed for nausea     venlafaxine (EFFEXOR XR) 75 MG 24 hr capsule Take 1 capsule (75 mg) by mouth daily     zolpidem ER (AMBIEN CR) 6.25 MG CR tablet Take 1 tablet (6.25 mg) by mouth nightly as needed for sleep     Current Facility-Administered Medications   Medication     lidocaine 1 % injection 3 mL     lidocaine 1 % injection 4 mL     triamcinolone (KENALOG-40) injection 40 mg       Past Medical/Surgical History:  Past Medical History:   Diagnosis Date     Abnormal glandular Papanicolaou smear of cervix- ASCUS in 2000 12/5/2003    ASC H- 2000     Basal cell carcinoma of leg, right 05/2016    Dr Rogers     Colon polyps 05/2018    tubular adenoam - due 5 yrs - 2 mm     Concussion without loss of consciousness 4/12/2017     Hidradenitis     left  groin     HSIL (high grade squamous intraepithelial lesion) on Pap smear of cervix 7/5/2000    Normal paps from 2001 to 2016/cc     Hypertension goal BP (blood pressure) < 140/90      OA (osteoarthritis) of knee     moderate     Postmenopausal since 7/2008      Shingles  7/23/2010    left      Squamous cell carcinoma of skin, unspecified      Synovial cyst of popliteal space       has a past medical history of Abnormal glandular Papanicolaou smear of cervix- ASCUS in 2000 (12/5/2003), Basal cell carcinoma of leg, right (05/2016), Colon polyps (05/2018), Concussion without loss of consciousness (4/12/2017), Hidradenitis, HSIL (high grade squamous intraepithelial lesion) on Pap smear of cervix (7/5/2000), Hypertension goal BP (blood pressure) < 140/90, OA (osteoarthritis) of knee, Postmenopausal (since 7/2008 ), Shingles (7/23/2010), Squamous cell carcinoma of skin, unspecified, and Synovial cyst of popliteal space.    She has no past medical history of Malignant melanoma (H) or Squamous cell carcinoma.    Social History:  Reviewed. No changes to social history except as noted above in HPI.    Vital Signs:   None. This is phone/video visit.     Labs:  Most recent laboratory results reviewed and the pertinent results include:   TSH   Date Value Ref Range Status   04/25/2022 1.30 0.40 - 4.00 mU/L Final   01/13/2021 2.07 0.40 - 4.00 mU/L Final     Lab Results   Component Value Date    WBC 8.1 04/25/2022    WBC 6.4 01/13/2021     Lab Results   Component Value Date    RBC 4.58 04/25/2022    RBC 4.54 01/13/2021     Lab Results   Component Value Date    HGB 13.6 04/25/2022    HGB 13.3 01/13/2021     Lab Results   Component Value Date    HCT 41.1 04/25/2022    HCT 41.7 01/13/2021     No components found for: MCT  Lab Results   Component Value Date    MCV 90 04/25/2022    MCV 92 01/13/2021     Lab Results   Component Value Date    MCH 29.7 04/25/2022    MCH 29.3 01/13/2021     Lab Results   Component Value Date    MCHC 33.1 04/25/2022    MCHC 31.9 01/13/2021     Lab Results   Component Value Date    RDW 12.9 04/25/2022    RDW 13.1 01/13/2021     Lab Results   Component Value Date     04/25/2022     01/13/2021     Last Comprehensive Metabolic Panel:  Sodium   Date Value Ref Range  Status   04/25/2022 137 133 - 144 mmol/L Final   01/13/2021 140 133 - 144 mmol/L Final     Potassium   Date Value Ref Range Status   04/25/2022 4.4 3.4 - 5.3 mmol/L Final   01/13/2021 4.1 3.4 - 5.3 mmol/L Final     Chloride   Date Value Ref Range Status   04/25/2022 105 94 - 109 mmol/L Final   01/13/2021 108 94 - 109 mmol/L Final     Carbon Dioxide   Date Value Ref Range Status   01/13/2021 28 20 - 32 mmol/L Final     Carbon Dioxide (CO2)   Date Value Ref Range Status   04/25/2022 26 20 - 32 mmol/L Final     Anion Gap   Date Value Ref Range Status   04/25/2022 6 3 - 14 mmol/L Final   01/13/2021 4 3 - 14 mmol/L Final     Glucose   Date Value Ref Range Status   04/25/2022 104 (H) 70 - 99 mg/dL Final   01/13/2021 91 70 - 99 mg/dL Final     Comment:     Fasting specimen     Urea Nitrogen   Date Value Ref Range Status   04/25/2022 23 7 - 30 mg/dL Final   01/13/2021 18 7 - 30 mg/dL Final     Creatinine   Date Value Ref Range Status   04/25/2022 0.78 0.52 - 1.04 mg/dL Final   01/13/2021 0.68 0.52 - 1.04 mg/dL Final     GFR Estimate   Date Value Ref Range Status   04/25/2022 83 >60 mL/min/1.73m2 Final     Comment:     Effective December 21, 2021 eGFRcr in adults is calculated using the 2021 CKD-EPI creatinine equation which includes age and gender (Mercedes et al., NEJM, DOI: 10.1056/ZKJSoh8247855)   01/13/2021 >90 >60 mL/min/[1.73_m2] Final     Comment:     Non  GFR Calc  Starting 12/18/2018, serum creatinine based estimated GFR (eGFR) will be   calculated using the Chronic Kidney Disease Epidemiology Collaboration   (CKD-EPI) equation.       Calcium   Date Value Ref Range Status   04/25/2022 9.3 8.5 - 10.1 mg/dL Final   01/13/2021 8.8 8.5 - 10.1 mg/dL Final     Bilirubin Total   Date Value Ref Range Status   04/25/2022 0.4 0.2 - 1.3 mg/dL Final   01/13/2021 0.8 0.2 - 1.3 mg/dL Final     Alkaline Phosphatase   Date Value Ref Range Status   04/25/2022 64 40 - 150 U/L Final   01/13/2021 72 40 - 150 U/L Final      ALT   Date Value Ref Range Status   04/25/2022 21 0 - 50 U/L Final   01/13/2021 17 0 - 50 U/L Final     AST   Date Value Ref Range Status   04/25/2022 11 0 - 45 U/L Final   01/13/2021 13 0 - 45 U/L Final     Review of Systems:  10 systems (general, cardiovascular, respiratory, eyes, ENT, endocrine, GI, , M/S, neurological) were reviewed. Most pertinent finding(s) is/are: Some chronic pains, see HPI above. The remaining systems are all unremarkable.    Mental Status Examination (limited as this is by phone/video):  Appearance: Awake, alert, appears stated age, no acute distress, well-groomed   Attitude:  cooperative, pleasant   Motor: Psychomotor slowing, not formally tested  Oriented to:  person, place, time, and situation  Attention Span and Concentration:  normal  Speech:  Still a little delayed/hesitant responses-much improved, normal volume  Language: intact  Mood: anxious  Affect: A little restricted but overall mood congruent  Associations:  no loose associations  Thought Process:  logical, linear and goal oriented  Thought Content:  no evidence of suicidal ideation or homicidal ideation, no evidence of psychotic thought, no auditory hallucinations present and no visual hallucinations present  Recent and Remote Memory:  Not formally assessed. Possibly some deficits? Could consider neuropsychological testing  Fund of Knowledge: appropriate  Insight:  fair-improving  Judgment:  intact, adequate for safety  Impulse Control:  intact    Suicide Risk Assessment:  Today Jolynn Haji reports no suicidal ideation. Based on all available evidence including the factors cited above, Jolynn Haji does not appear to be at imminent risk for self-harm, does not meet criteria for a 72-hr hold, and therefore remains appropriate for ongoing outpatient level of care.  A thorough assessment of risk factors related to suicide and self-harm have been reviewed and are noted above. The patient convincingly denies suicidality on  several occasions. Local community safety resources printed and reviewed for patient to use if needed. There was no deceit detected, and the patient presented in a manner that was believable.     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  >> Colpo: Neg  2001 NIL   2003 NIL pap, Neg HPV  [id number], 2016  NIL pap  7/25/19 NIL pap, Neg HPV    Criteria necessary to stop screening per ASCCP guidelines:  Older than 65 years  Three consecutive negative cytology results or two consecutive negative cotest results within the previous 10 years, with the most recent being performed within the last 5 years.   (Women with hx of MADDIE 2 or 3, or adenocarcinoma in situ should continue screening for full 20 years, even if this goes past age 65).         Hyperlipidemia LDL goal <130 11/30/2018     Priority: Medium     Basal cell carcinoma      Priority: Medium     Colon polyps 05/01/2018     Priority: Medium     Primary localized osteoarthrosis of left lower leg 02/19/2018     Priority: Medium     Other secondary osteoarthritis of left knee 01/29/2018     Priority: Medium     Chronic constipation 09/14/2017     Priority: Medium     Raynaud's phenomenon without gangrene 09/14/2017     Priority: Medium     Insomnia, unspecified type 07/25/2017     Priority: Medium     Generalized anxiety disorder 05/23/2017      Priority: Medium     Adjustment disorder with mixed anxiety and depressed mood- mild-moderate  at this time 05/11/2017     Priority: Medium     Family history of celiac disease 05/11/2017     Priority: Medium     Basal cell carcinoma of leg, right 05/01/2016     Priority: Medium     Sun-damaged skin 04/21/2016     Priority: Medium     Seasonal allergic rhinitis 04/21/2016     Priority: Medium     Dupuytren's disease of palm - right 4th digit flexor tendon 06/11/2015     Priority: Medium     History of migraine headaches- assoc. with nausea/vomiting - resolved around menopause - were  premenstrual  01/08/2014     Priority: Medium     Rosacea 05/01/2013     Priority: Medium     Numbness of feet- distal feet R>L  02/21/2011     Priority: Medium     NUMBNESS AND TINGLING OF FOOT - bilateral -mild  08/25/2010     Priority: Medium     Osteopenia 01/02/2009     Priority: Medium     Lipidosis 10/24/2006     Priority: Medium     Problem list name updated by automated process. Provider to review       Localized osteoarthritis of hand 11/01/2005     Priority: Medium     Problem list name updated by automated process. Provider to review       Stress incontinence, female - mild 10/23/2012     Priority: Low     Cough 10/23/2012     Priority: Low     Acute bronchitis- recurrent- ? related to mold in school building- pt has since retired and no further bronchitis 10/23/2012     Priority: Low     Postmenopausal      Priority: Low     DERMATOPHYTOSIS OF NAIL- toenails  03/14/2006     Priority: Low     trichophyton rubrum on culture - 1/06        Synovial cyst of popliteal space      Priority: Low     Hidradenitis 12/05/2003     Priority: Low       Psychosocial & Contextual Factors: see HPI above    Assessment:  From Intake, 5/12/2022:  Jolynn Haji is a 66-year-old female with a history including anxiety, depression, insomnia status post concussion in April 2017.  No major mental health struggles prior to concussion in 2017.  Patient  was started on Lexapro, quetiapine, mirtazapine to help with her symptoms.  Patient had been feeling much better on that medication combination and once she was sleeping well again quetiapine and mirtazapine were discontinued.  This past January the patient started to have some worsening symptoms and Abilify was added.  Patient has felt like her current medications of Lexapro and Abilify have been helpful but reports starting to feel really slowed and numb the past several weeks.  She also reported a weight loss of nearly 20 pounds over the last 6 weeks versus several months (patient was not very sure). I am wondering if she could be experiencing side effects of her increased Lexapro dose.  She is agreeable to decreasing her dose slightly to 15 mg daily.  Could also consider decreasing Abilify in the case it is not her Lexapro.  She also desires to sleep a little bit better than she has been.  Could consider restarting mirtazapine at bedtime for sleep and to also augment her Lexapro.  Could also be beneficial for patient to undergo cognitive screening to check in and see what her baseline is status post concussion.  No acute safety concerns.  No SI.  No problematic drug or alcohol use.    5/24/2022:  Patient with some slight improvements of possible medication related side effects after decreasing Lexapro.  I am wondering if there might be more robust improvements with decreasing Abilify/aripiprazole.  Possible drug-induced Parkinsonism?  She does seem to have delayed/slowed speech, masked facies.  Her gait and other movements unable to be observed but she does report other struggles that may be consistent with such a presentation.  We are going to decrease her Abilify down to 2 mg daily and I will see her back in just a few weeks.  We will likely discontinue her Abilify at her next visit.  I will message her primary care provider to get some collateral regarding previous baseline functioning prior to starting  Abilify.  History of TBI could be confounding presentation.  Could consider neurology consult.    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  Instructed to stop Abilify today.  We will monitor for improvement of symptoms.  If symptoms do not improve may need to refer to neurology.  If symptoms start to improve we will consider low-dose methylphenidate augmentation of Lexapro next visit to help with low energy, mood, focus and concentration, cognition, s/p TBI if still clinically indicated.  We would discuss risks and benefits with patient and her daughter at that time.  No other acute safety concerns or SI.  No problematic drug or alcohol use.  If patient starts to struggle with sleep would restart mirtazapine at bedtime.    7/28/2022:  Pt still with sxs consistent with drug-induced Parksinsonism.  Neurology consult will be placed so that patient might get scheduled sooner than later due to the typically long wait.  We will start Ambien in hopes we can get her sleeping much better.  Discussed risks and benefits of its use, particularly in the geriatric population.  Lexapro has not been as helpful as it helped for her anxiety.  We will taper this medication and start Effexor/venlafaxine in hopes that might be more effective for her symptoms.  No acute safety concerns.  No SI.  No problematic drug or alcohol use.    8/11/2022:  Overall with continued improved symptoms.  Hopefully patient will continue to have improvement of her symptoms off of Abilify.  Patient will be seeing neurology soon.  Encouraged to keep appointment.  We will continue taper off of Lexapro and continue on monotherapy with venlafaxine.  We will also continue Ambien at this time for sleep since patient is sleeping much better on the medication with improved symptoms and tolerating well.  No acute safety concerns.  No problematic drug or alcohol use.    9/1/2022:  Ongoing anxiety, still intense at times.  Still not  sleeping very well.  Could consider sleep evaluation, but patient quite overwhelmed right now with various referrals and doctors appointments.  Briseyda had been working quite well but not keeping her sleep now.  Still falling asleep relatively well.  We will transition to Ambien CR to see if that is more helpful.  We will continue to consider sleep evaluation to rule out possibility of REM behavior sleep disorder.  We will also transition venlafaxine to bedtime to see if that helps with any possible nausea.  Working with neurology to monitor for Parkinson's disease.  I do recommend neuropsychological testing due to ongoing possible cognitive difficulties/struggles.  No acute safety concerns.  No SI.  No problematic drug or alcohol use.    10/3/2022:  Patient overall with some ongoing anxiety but slowly improving since it got worse late August.  Recommend continuing to optimize Effexor-XR, especially since patient tolerating well.  No longer having any nausea now that dose is taken at bedtime.  Patient feeling a little foggy and off balance with Ambien CR and so we will switch back to Ambien immediate release.  No falls.  Recommend increasing Effexor-XR first and once well tolerated switching back to Ambien immediate release.  May need to continue to work on alternative options for sleep if does not do well back on Ambien immediate release.  No acute safety concerns.  No SI.  No problematic drug or alcohol use.  Encouraged to continue working with neurology.  Next appointment in November.    Medication side effects and alternatives were reviewed. Health promotion activities recommended and reviewed today. All questions addressed. Education and counseling completed regarding risks and benefits of medications and psychotherapy options. Recommend therapy for additional support.     Treatment Plan:    Change Ambien CR back to to Ambien IR (immediate release version) 2.5-5 mg at bedtime as needed for sleep. Do not take  with alcohol or opioid pain medication. Make sure to have 8-10 hours to devote to sleep after taking. Careful/caution for falls in middle of night.  Discussed taking a trial off the medication if able, and if your anxiety improved some.    Increase Effexor-XR/venlafaxine ER to 112.5 mg daily for depression and anxiety (take 75 mg cap WITH 37.5 mg cap). Can continue to take at bedtime.    Recommend making the change with Effexor-XR first, and once used to the new dose of Effexor-XR, then change your Ambien CR back to Ambien IR.    Continue ondansetron/Zofran 4 mg every 8 hrs as needed for nausea caused by Effexor-XR changes.    Continue to follow with neurology as they recommend.      Continue all other cares per primary care provider.     Continue all other medications as reviewed per electronic medical record today.     Safety plan reviewed. To the Emergency Department as needed or call after hours crisis line at [phone] or [phone]. [place] Crisis Text Line. Text [place] to 649109 or Suicide LifeLine Chat: suicidepreventionlifeline.org/chat    Consider individual psychotherapy for additional support and ongoing development of nonpharmacologic coping skills and strategies.    Schedule an appointment with me in 3-4 weeks or sooner as needed. Call [facility] at [phone] to schedule.    Follow up with primary care provider as planned or for acute medical concerns.    Call the psychiatric nurse line with medication questions or concerns at [phone].    [employer] may be used to communicate with your provider, but this is not intended to be used for emergencies.    Administrative Billing:   Phone Call/Video Duration: 20 Minutes  Start: 11:10a  Stop: 11:30a    Time spent with patient was 20 minutes and greater than 50% of time or 12 minutes was spent in counseling and coordination of care regarding above diagnoses and treatment plan.    Patient Status:  Patient will continue to be seen for  ongoing consultation and stabilization.    Signed:   Judy Pompa DO  [facility]    Disclaimer: This note consists of symbols derived from keyboarding, dictation and/or voice recognition software. As a result, there may be errors in the script that have gone undetected. Please consider this when interpreting information found in this chart.

## 2022-10-03 NOTE — PROGRESS NOTES
Assessment & Plan       ICD-10-CM    1. Primary localized osteoarthrosis of left lower leg- left knee  M17.12 DRAIN/INJECT LARGE JOINT/BURSA     lidocaine 1 % 4 mL     triamcinolone (KENALOG-40) injection 40 mg        Review of prior external note(s) from - Dr. Quiroz, orthopedics   Prescription drug management  35 minutes spent on the date of the encounter doing chart review, history and exam, documentation and further activities per the note    Rest the affected painful area as much as possible.  Apply ice for 15-20 minutes intermittently as needed and especially after any offending activity. Daily stretching.  As pain recedes, begin normal activities slowly as tolerated.  Consider Physical Therapy if symptoms not better with symptomatic care.     Pt aware that her pain may worsent.        MEDICATIONS:  Continue current medications without change  Regular exercise  See Patient Instructions    Return in 25 days (on 10/28/2022) for depression/anxiety follow up, w/ Dr. SUE for 40 minute appointment per patient request .    Future Appointments 10/3/2022 - 4/1/2023              Date Visit Type Length Department Provider     10/5/2022 10:00 AM [facility] RETURN 60 min [facility]    Location Instructions:     Your appointment is at [facility] located in the [facility] at [address] [place] [address]. Please check in at the  in [address].              10/24/2022 10:30 AM [facility] min [facility] BE[facility] Young, Kenneth Ray, PsyD    Location Instructions:     [facility] has 2 buildings on its campus. Please visit us at [address] [place] and enter the building with the numbers [id number] on it.               10/24/2022 11:00 AM [facility] ADULT PSYCHIATRY RETURN 30 min FZ PSYCHIATRY Judy Pompa DO    Location Instructions:     [facility] has 2 buildings on its campus. Please visit us at  [address] [place], [place] and enter the building with the numbers [id number] on it.               10/28/2022  1:00 PM RETURN KNEE 20 min [facility] BU ORTHO SURG Giovany Quiroz MD              10/28/2022  4:40 PM OFFICE VISIT 40 min  FAMILY PRACTICE Helga Ibarra MD    Location Instructions:     [facility] is located at [address], [address] Free parking is available; access the lot by turning north from [address] onto [facility] [address], then west onto [facility] [address] [facility].              11/10/2022  9:00 AM MOHS 15 min  DERMATOLOGY Sheldon Rogers MD    Location Instructions:     [address] [place] [id number]              11/17/2022  9:00 AM MOHS 15 min OX DERMATOLOGY Sheldon Rogers MD    Location Instructions:     [address] [place] [id number]              1/9/2023  3:00 PM ADULT PSYCHOTHERAPY NEW 60 min [facility] PSYCHOLOGY Paula Navarrete, PhD    Location Instructions:     Located in the Clinics and Surgery Center at [address] [address]. For parking options, enter the [facility]arrival plaza from [facility] and attendants can assist you based on your needs.  parking is available for those with limited mobility M-F from 7 a.m. to 5 p.m. Due to short staffing, we are unable to offer  to all patients/visitors. Visit [email or web address] for more details.  Self-parking:&nbsp;  West Lot: Located across from the main entrance, this is a convenient option for patients. Enter on [facility]. Parking attendants available most hours to assist.&nbsp;     [facility] Ramp: Enter at the [facility] SE entrance (one block north of the [facility] main entrance). Do not enter the ramp from [facility] - this entrance is not staffed and is further from the [facility] main entrance.              3/17/2023 12:30 PM [facility]MP NEUROPSYCH EVAL 240 min [facility] NEUROPSYCHOLOGY Kassie Ashton, PhD    Location  Instructions:     Located in the Clinics and Surgery Center at [address] [facility] [address]. For parking options, enter the [facility] /arrival plaza from [facility] and attendants can assist you based on your needs.  parking is available for those with limited mobility M-F from 7 a.m. to 5 p.m. Due to short staffing, we are unable to offer  to all patients/visitors. Visit [email or web address] for more details.  Self-parking:&nbsp;  West Lot: Located across from the main entrance, this is a convenient option for patients. Enter on [facility]. Parking attendants available most hours to assist.&nbsp;     [facility] Ramp: Enter at the [facility] SE entrance (one block north of the [facility] main entrance). Do not enter the ramp from [facility] - this entrance is not staffed and is further from the [facility] main entrance.                        Helga Ibarra MD  [facility] [place]        Subjective :   Azalea is a 67 year old accompanied by her , Zach, presenting for the following health issues:  steroid injection left knee      HPI     Steroid injection in left knee.  Last saw Dr. Giovany Quiroz , [facility] for same on 7/1/2022.  I recommended not doing another steroid injection for at least 3 months if not 6 months and following up with Dr. Quiroz.  We are doing  this injection today as pt wasn't able to get into Dr. Quiroz until end of this month.    Reviewed pt's visit from 7/1/2022 and xrays done at that time in detail with pt again today.    She may also be a good candidate for hyaluronate injection instead of steroids for next injection.  Pt would like to avoid a knee replacement surgery.     Patient Active Problem List   Diagnosis     Essential hypertension with goal blood pressure less than 140/90     Hidradenitis     Localized osteoarthritis of hand     Synovial cyst of popliteal space     DERMATOPHYTOSIS OF NAIL- toenails       Lipidosis     Osteopenia     Postmenopausal     NUMBNESS AND TINGLING OF FOOT - bilateral -mild      Numbness of feet- distal feet R>L      Stress incontinence, female - mild     Cough     Acute bronchitis- recurrent- ? related to mold in school building- pt has since retired and no further bronchitis     Rosacea     History of migraine headaches- assoc. with nausea/vomiting - resolved around menopause - were  premenstrual      Dupuytren's disease of palm - right 4th digit flexor tendon     Sun-damaged skin     Seasonal allergic rhinitis     Basal cell carcinoma of leg, right     Adjustment disorder with mixed anxiety and depressed mood- mild-moderate  at this time     Family history of celiac disease     Generalized anxiety disorder     Insomnia, unspecified type     Chronic constipation     Raynaud's phenomenon without gangrene     Other secondary osteoarthritis of left knee     Primary localized osteoarthrosis of left lower leg     Hyperlipidemia LDL goal <130     Basal cell carcinoma     Colon polyps     Screening for cervical cancer     Adjustment insomnia     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        Current Outpatient Medications   Medication Sig Dispense Refill     clindamycin (CLINDAGEL) 1 % external gel Apply topically daily 60 g 11     lactobacillus rhamnosus, GG, (CULTURELL) capsule Take 1 capsule by mouth daily       lisinopril (ZESTRIL) 2.5 MG tablet Take 1 tablet (2.5 mg) by mouth daily 90 tablet 3     ondansetron (ZOFRAN ODT) 4 MG ODT tab Take 1 tablet (4 mg) by mouth every 8 hours as needed for nausea 30 tablet 0     venlafaxine (EFFEXOR XR) 37.5 MG 24 hr capsule Take 1 capsule (37.5 mg) by mouth daily Take WITH 75 mg cap for total daily dose 112.5 mg. 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      Objective    /80   Pulse 100   Temp 97  F (36.1  C)   Ht 1.715 m (5' 7.5\")   Wt 58.1 kg (128 lb)   LMP 06/28/2001   SpO2 96%   BMI 19.75 kg/m    Body mass index is 19.75 kg/m .  Physical Exam :   GENERAL: healthy, alert and no distress  EYES: Eyes grossly normal to inspection, PERRL and conjunctivae and sclerae normal  HENT: ear canals and TM's normal, nose and mouth without ulcers or lesions  NECK: no adenopathy, no asymmetry, masses, or scars and thyroid normal to palpation  MS: no gross musculoskeletal defects noted, there is some joint enlargement to the left knee area without matt effusion. no edema  SKIN: no suspicious lesions or rashes  NEURO: Normal strength and tone, mentation intact and speech normal  PSYCH: mentation appears normal, affect normal/bright    Xray - Reviewed and interpreted by me.  Bilateral standing , lateral and sunrise views of knees from 7/1/2022 .     Last knee joint steroid injection done on 7/1/2022 by Dr Giovany Quiroz MD, Orthopedics  - at [facility].      Large Joint Injection/Arthocentesis: L knee joint     Date/Time: October 3, 2022 4:35p       Procedure discussed: discussed risks, benefits, and alternatives   - After risks of bleeding, infection, scarring, permanent disability, benefits and alternatives were discussed, written  informed consent was obtained for left knee joint steroid/lidocaine injection. This was witnessed by Pallavi Miramontes CMA  .  Pause for the cause was done for the above procedure with patient and Pallavi " DORETHA Miramontes   as well.   Consent Given by:  Patient  Timeout: timeout called immediately prior to procedure    Prep: patient was prepped with betadine and injection was done using sterile technique.     Performed by: Helga Ibarra MD    Authorized by: Helga Ibarra MD    Indications:  Pain secondary to osteoarthritis   Needle Size:  22  G , 1and 1/2 inches long   Guidance: landmark guided    Approach:  Anterolateral with left knee flexed in relaxed position at 90 degrees - no blood return   Location:  left Knee      Medications:  40 mg triamcinolone 40 MG/ML: 1  mL with lidocaine 1 %; 4 mL  = 5ml total   Outcome:  Tolerated well, no immediate complications  The area was then dressed with bacitracin ointment and a bandaid.

## 2022-10-03 NOTE — TELEPHONE ENCOUNTER
Reason for call:  Medication   If this is a refill request, has the caller requested the refill from the pharmacy already? Yes  Will the patient be using a [facility]? Yes     Name of the pharmacy and phone number for the current request: [facility] [place] - [place], [place] - [address] [place] [address] (Ph: [phone])    Name of the medication requested: venlafaxine (EFFEXOR XR) 37.5 MG 24 hr capsu    Other request:  Pt saw Aletha today but this med wasn't sent to the pharmacy and she is supposed to start it tonight.  Pt requests call back if there is a problem getting it filled    Phone number to reach patient:  [phone]    Best Time:  anytime    Can we leave a detailed message on this number?  YES    Travel screening: Not Applicable

## 2022-10-03 NOTE — PROGRESS NOTES
[facility] Psychiatry Services [facility]  October 3, 2022      Behavioral Health Clinician Progress Note    Patient Name: Jolynn Haji           Service Type:  Individual      Service Location:   [facility]     Session Start Time: 10:30 am  Session End Time: 11:00 am      Session Length: 16 - 37      Attendees: Patient     Service Modality:  Video Visit:      Provider verified identity through the following two step process.  Patient provided:  Patient is known previously to provider    Telemedicine Visit: The patient's condition can be safely assessed and treated via synchronous audio and visual telemedicine encounter.      Reason for Telemedicine Visit: Services only offered telehealth    Originating Site (Patient Location): Patient's home    Distant Site (Provider Location): Provider Remote Setting- Home Office    Consent:  The patient/guardian has verbally consented to: the potential risks and benefits of telemedicine (video visit) versus in person care; bill my insurance or make self-payment for services provided; and responsibility for payment of non-covered services.     Patient would like the video invitation sent by:  My Chart    Mode of Communication:  Video Conference via [facility]    As the provider I attest to compliance with applicable laws and regulations related to telemedicine.    Visit Activities (Refresh list every visit): [facility] Only    Diagnostic Assessment Date: 05/12/2022  Treatment Plan Review Date: 12/01/2022  See Flowsheets for today's PHQ-9 and TIFFANY-7 results  Previous PHQ-9:   PHQ-9 SCORE 8/25/2022 9/27/2022 9/29/2022   PHQ-9 Total Score - - -   PHQ-9 Total Score MyChart 7 (Mild depression) 6 (Mild depression) 7 (Mild depression)   PHQ-9 Total Score 7 6 7   Some encounter information is confidential and restricted. Go to Review Flowsheets activity to see all data.     Previous TIFFANY-7:   TIFFANY-7 SCORE 7/11/2022 8/25/2022 9/27/2022   Total Score - - -   Total Score 5 (mild  "anxiety) 8 (mild anxiety) 9 (mild anxiety)   Total Score 5 8 9       KARINA LEVEL:  KARINA Score (Last Two) 8/27/2010 2/22/2011   KARINA Raw Score 51 49   Activation Score 91.6 82.8   KARINA Level 4 4       DATA  Extended Session (60+ minutes): No  Interactive Complexity: No  Crisis: No  [facility] Patient: No    Treatment Objective(s) Addressed in This Session:  Target Behavior(s): anxiety    Anxiety: will develop more effective coping skills to manage anxiety symptoms    Current Stressors / Issues:  Reported that she continues to wake up with anxiety. Approximately 3 days a week, she has an experience of \"hitting a wall,\" feeling more fatigued, and her mind is \"scattered\" such that she goes from job to job without finishing any of them. She spoke about how she has been continuing to engage in activities \"but it takes a lot of effort sometimes.\" She acknowledged that the anxiety does get better after she gets started. She is starting therapy soon and hopes to learn more skills to manage her anxiety better.      09/01/2022 (Pily Betts, [facility]):  MH Update: still not sleeping well, getting nauseous and worried it could be related to the Ambien or possibly the Effexor. Mood continues to improve but \"anxious to feel better.\" Has been spending time with friends and finds that helpful. Has moments of feeling \"thick headed\" during the day and worries is related to Ambien. Energy has increased during the day, able to accomplish more. Better concentrate and comprehending what she does read.  [facility] validated patient for her continued distress.  [facility] normalized patient needing to allow herself, her brain and her medication more time to adjust to all of these changes.  [facility] processed patient using kindness with herself and trying not to compare how she is doing now with how she was doing in the past.  [facility] discussed patient using the idea of pacing and focusing on doing something for a small period of time instead of feeling that she needs to " accomplish the entire task.  [facility] also discussed patient allowing family to help and simplifying where she can.      Patient indicates she saw a neurologist this week in order to establish baseline.  She was told by that provider but there was nothing to worry about and not to have anxiety about her health.  Patient has follow-up in 3 months.  Patient is hopeful that the symptoms she was experiencing were related to side effects of Abilify and not something more.    SI: passive thoughts of wishing life would get better, how long can I live like this. Denies having a plan or intent. No prior attempts. Has people and things to live for. Able to redirect self and feels safe. Has supports she can reach out to and aware of crisis resources.    Tx: not in therapy, [facility] to place referral for [facility] and health psychologist    Sleep: able to fall asleep but then wakes up at 3 am and cant get back to sleep. Not noticing any anxious thoughts. Sometimes wakes up due to nausea and waits for it to pass.  [facility] used CBT I to review sleep practices and praised patient for having a healthy routine and relaxation time.  [facility] discussed safe space visualization and patient trying to use this when she wakes up to see if it assists with her getting back to sleep.  Appetite: improved, had lost 30 pounds and feels that is getting back to better place    Etoh: denies  Substance: denies  Nicotine: denies  Caffeine: 1 cup of coffee per day    Most Important: side effect of feeling nauseous and still not sleeping well, sometimes nausea that wakes up. Is there an Ambien slow release or something else to stop the nausea?      08/11/2022:  Reported that she continues to improve. She stated that she is no longer walking in a shuffle. Her anxiety is much better. She is no longer waking up with intense anxiety. She is more active, socializing more often, and she is very pleased with her progress. She has noticed that every 4 days or so she wakes up at night  "after 3 hours and feels rested. She will stay in bed but will plan the day. Sometimes she feels tired later in the day and sometimes she does not. She said she had a concussion 4 years ago and \"I did not sleep at all for about a month.\" She would like to know if Ambien is addictive or if there are other options that might not be addictive. She found the 2.5mg did not help as much as 5mg. She would also like to know if she can take something late at night when she cannot go back to sleep. She also has questions about whether the venlafaxine is causing her sleep problems at night. She would also like to discuss the plan for the Lexapro. We spoke about ways to learn some skills to better manage worry.      07/28/2022:  Reported that she was doing well until about 10 days ago when she started waking up with significant anxiety for several hours rating the intensity as 8/10. \"I've never had that before. I had to cancel coffee with my friend because I was so anxious.\" She described the anxiety as her heart racing and breathing heavy. She notices that the anxiety is better in the afternoon and gone by the evening. Mirtazapine helped initially for 2 days, but not as much now. The increase in Lexapro has been fine. She would like to know why she might be waking up with this kind of anxiety in the mornings and when it might end.       06/14/2022:  Reported that she has not been able to notice a difference by lowering the Abilify. Reported that she is still feeling depressed and does not feel it has gotten any better/worse than when she first started with [facility]PS. She is waking up with cramps. She would like to know how long it will take for her to feel better and would like to know how much she needs to push herself to do things. She spoke about going to lunch with some friends and had some anticipation anxiety. She worried that she would not be able to stay focused on the conversation and participate. The anxiety did go away " "when she got there but she felt tired and had to leave about an hour later which is unusual as they usually have lunch for about 3 hours.       05/24/2022:  Reported that she can focus \"a little longer than before\" and she feels she is \"staying in the present\" rather than worrying about the next thing to come. Her sleep is a little better. Instead of waking up at 1:30am, she sleeps until about 4:30am, uses the bathroom, and she can go back to sleep. She still feels tired during the day and estimated that it is about the same as before. She said that she is able to complete tasks. \"I won't win any races but I can get them done now.\" She noted that she had no concerns about her current medications.      05/12/2022 (DA):  \"I'm hoping we can figure out what triggered this setback.\" She was diagnosed with anxiety and depression after a concussion a concussion in 04/2017. She had been sick at the time, got up one night and fell. She is uncertain where on her head she landed. She was on Lexapro, mirtazapine, and seroquel. She thought that it was \"fixed\", stopped taking the mirtazapine and seroquel but stayed on the lexapro. In January, started feeling more anxiety. PCP added Abilify which was helpful. The Lexapro was increased which she is not sure if it has been helpful (been on it for 4 weeks). She said, \"I feel like I'm in [place]. I have a hard time thinking things through.\" Her anxiety and sleep is better but bothered by the slowed thinking.       Progress on Treatment Objective(s) / Homework:  Minimal progress - ACTION (Actively working towards change); Intervened by reinforcing change plan / affirming steps taken    Also provided psychoeducation about behavioral health condition, symptoms, and treatment options    Care Plan review completed: Yes    Medication Review:  Changes to psychiatric medications, see updated Medication List in EPIC.     Medication Compliance:  Yes    Changes in Health Issues:   Yes: Sleep " disturbance, Associated Psychological Distress    Chemical Use Review:   Substance Use: Chemical use reviewed, no active concerns identified      Tobacco Use: No current tobacco use.      Assessment: Current Emotional / Mental Status (status of significant symptoms):  Risk status (Self / Other harm or suicidal ideation)  Patient denies a history of suicidal ideation, suicide attempts, self-injurious behavior, homicidal ideation, homicidal behavior and and other safety concerns  Patient denies current fears or concerns for personal safety.  Patient reports the following current or recent suicidal ideation or behaviors: Patient reports passive thoughts about questioning life but denies that she was suicidal.  Patient does feel safe.  Patient denies current or recent homicidal ideation or behaviors.  Patient denies current or recent self injurious behavior or ideation.  Patient denies other safety concerns.  A safety and risk management plan has not been developed at this time, however patient was encouraged to call [place] [phone] should there be a change in any of these risk factors.    Appearance:   Appropriate   Eye Contact:   Good   Psychomotor Behavior: Normal   Attitude:   Cooperative   Orientation:   All  Speech   Rate / Production: Normal    Volume:  Normal   Mood:    Anxious   Affect:    Appropriate   Thought Content:  Clear   Thought Form:  Coherent  Logical   Insight:    Fair     Diagnoses:  1. Generalized anxiety disorder      Collateral Reports Completed:  Communicated with: Dr. Pompa    Plan: (Homework, other):  Patient was given information about behavioral services and encouraged to schedule a follow up appointment with the clinic [facility]  She was also given information about mental health symptoms and treatment options .  CD Recommendations: No indications of CD issues. Eran Landon PsyD,  LP      ______________________________________________________________________    [facility] Psychiatry Services - [place]: Treatment Plan    Patient's Name: Jolynn Haji  YOB: 1955    Date of Creation: May 24, 2022  Date Treatment Plan Last Reviewed/Revised: 09/01/2022    DSM5 Diagnoses: 300.02 (F41.1) Generalized Anxiety Disorder or 780.52 (G47.00) Insomnia Disorder   With non-sleep disorder mental comorbidity  Persistent    Psychosocial / Contextual Factors: family  PROMIS (reviewed every 90 days):   PROMIS 10-Global Health (only subscores and total score):   PROMIS-10 Scores Only 5/12/2022 5/12/2022 7/1/2022 9/27/2022 9/27/2022   Global Mental Health Score 9 9 17 10 10   Global Physical Health Score 17 17 13 17 17   PROMIS TOTAL - SUBSCORES 26 26 30 27 27       Referral / Collaboration:  The following referral(s) will be initiated: [facility] to place referral for either [facility] or health psychology services.    Anticipated number of session for this episode of care: 5-6  Anticipation frequency of session: As determined by Dr. Pompa  Anticipated Duration of each session: 16-37 minutes  Treatment plan will be reviewed in 90 days or when goals have been changed.       MeasurableTreatment Goal(s) related to diagnosis / functional impairment(s)  Goal 1: Patient will work with providers to manage symptoms    I will know I've met my goal when I can finish my tasks.      Objective #A (Patient Action)  Patient will attend all appointments, take medication as prescribed.  Status: Continued - Date(s):  9/01/2022    Intervention(s)  [facility] will Monitor and assist in overcoming barriers to treatment adherence    Objective #B  Patient will consider all recommendations offered.  Status: Continued - Date(s): 09/01/2022     Intervention(s)  [facility] will educate patient on treatment options, clarify concerns, work with pt to overcome any resistance to compliance.      Patient has reviewed and agreed to the  above plan.      Anthony Landon PsyD  10/03/2022

## 2022-10-03 NOTE — Clinical Note
Please call this patient to get them scheduled for a follow-up visit in 3 weeks. Please schedule with me and the [facility]. Thanks!

## 2022-10-03 NOTE — PATIENT INSTRUCTIONS
Treatment Plan:  Change Ambien CR back to to Ambien IR (immediate release version) 2.5-5 mg at bedtime as needed for sleep. Do not take with alcohol or opioid pain medication. Make sure to have 8-10 hours to devote to sleep after taking. Careful/caution for falls in middle of night.  Discussed taking a trial off the medication if able, and if your anxiety improved some.  Increase Effexor-XR/venlafaxine ER to 112.5 mg daily for depression and anxiety (take 75 mg cap WITH 37.5 mg cap). Can continue to take at bedtime.  Recommend making the change with Effexor-XR first, and once used to the new dose of Effexor-XR, then change your Ambien CR back to Ambien IR.  Continue ondansetron/Zofran 4 mg every 8 hrs as needed for nausea caused by Effexor-XR changes.  Continue to follow with neurology as they recommend.    Continue all other cares per primary care provider.   Continue all other medications as reviewed per electronic medical record today.   Safety plan reviewed. To the Emergency Department as needed or call after hours crisis line at [phone] or [phone]. [place] Crisis Text Line. Text [place] to 638221 or Suicide LifeLine Chat: suicidepreventionlifeline.org/chat  Consider individual psychotherapy for additional support and ongoing development of nonpharmacologic coping skills and strategies.  Schedule an appointment with me in 3-4 weeks or sooner as needed. Call [facility] at [phone] to schedule.  Follow up with primary care provider as planned or for acute medical concerns.  Call the psychiatric nurse line with medication questions or concerns at [phone].  MyChart may be used to communicate with your provider, but this is not intended to be used for emergencies.

## 2022-10-04 NOTE — PROGRESS NOTES
[facility]  October 5, 2022      Behavioral Health Clinician Progress Note    Patient Name: Jolynn Haji           Service Type:  Individual      Service Location:   MyChart / Email (patient reached)     Session Start Time: 9:59am  Session End Time: 10:55am      Session Length: 53 - 60      Attendees: Patient     Service Modality:  Video Visit:      Provider verified identity through the following two step process.  Patient provided:  Patient is known previously to provider    Telemedicine Visit: The patient's condition can be safely assessed and treated via synchronous audio and visual telemedicine encounter.      Reason for Telemedicine Visit: Patient has requested telehealth visit    Originating Site (Patient Location): Patient's home    Distant Site (Provider Location): [facility]    Consent:  The patient/guardian has verbally consented to: the potential risks and benefits of telemedicine (video visit) versus in person care; bill my insurance or make self-payment for services provided; and responsibility for payment of non-covered services.     Patient would like the video invitation sent by:  My Chart    Mode of Communication:  Video Conference via Amwell    As the provider I attest to compliance with applicable laws and regulations related to telemedicine.    Visit Activities (Refresh list every visit): [facility] Only    Diagnostic Assessment Date: 9/27/22  Treatment Plan Review Date: 10/5/22  See Flowsheets for today's PHQ-9 and TIFFANY-7 results  Previous PHQ-9:   PHQ-9 SCORE 8/25/2022 9/27/2022 9/29/2022   PHQ-9 Total Score - - -   PHQ-9 Total Score [facility] 7 (Mild depression) 6 (Mild depression) 7 (Mild depression)   PHQ-9 Total Score 7 6 7   Some encounter information is confidential and restricted. Go to Review Flowsheets activity to see all data.     Previous TIFFANY-7:   TIFFANY-7 SCORE 7/11/2022 8/25/2022 9/27/2022   Total  Score - - -   Total Score 5 (mild anxiety) 8 (mild anxiety) 9 (mild anxiety)   Total Score 5 8 9       KARINA LEVEL:  KARINA Score (Last Two) 8/27/2010 2/22/2011   KARINA Raw Score 51 49   Activation Score 91.6 82.8   KARINA Level 4 4       DATA  Extended Session (60+ minutes): No  Interactive Complexity: No  Crisis: No  [facility] Patient: No    Treatment Objective(s) Addressed in This Session:  Target Behavior(s): anxiety and depression    Depressed Mood: Increase interest, engagement, and pleasure in doing things  Decrease frequency and intensity of feeling down, depressed, hopeless  Improve quantity and quality of night time sleep / decrease daytime naps  Feel less tired and more energy during the day   Improve diet, appetite, mindful eating, and / or meal planning  Identify negative self-talk and behaviors: challenge core beliefs, myths, and actions  Improve concentration, focus, and mindfulness in daily activities   Feel less fidgety, restless or slow in daily activities / interpersonal interactions  Anxiety: will experience a reduction in anxiety, will develop more effective coping skills to manage anxiety symptoms, will develop healthy cognitive patterns and beliefs and will increase ability to function adaptively    Current Stressors / Issues:  Reports doing well today. She has been trying to remain social. She tends to have an initial panic stricken feeling when committing to something with others. Since last med adjustment appears more calmer. Discussed areas in her life which appear to cause most of her anxiety. One area is around situations she has little control over, such as the result of her mental health. Discussed focus on acceptance of current state of mental health and focus on things she does have control over such as active steps she is taking to help her mental health. Discussed deep breathing exercise as a way of calming self with highly anxious. Introduced progressive muscle relaxation. Briefly discussed  getting into a practice of meditation again. Finally, discussed finding frequent exercise as a part of anxiety. This is a bit more difficult as she has a bad knee which has been limiting for her. Pt is committed to trying out tech. Prior to next session.        9/27/22 DA  Met with pt for initial assessment. Reports several years ago she had a significant head injury which resulted in her inability to sleep. She reports anxiety followed shortly after. Since April of this year she has been experiencing more depressive sx. Her anxiety is mostly around wanting to feel better from her anxiety and not having positive results. Some of the depressive sx she has been having is poor appetite, lack of interest, struggle with focus, low energy, and has had on very rare occasions fleeting thoughts of wishing she were dead to end the pain of depression. Her anxiety sx often are in the form of nervousness, restlessness, and rumination. She used to have panic attacks each morning, however this has subsided for several months now. She has not attempted therapy before. She would like to gain skills relating to managing anxiety. She currently is working with Dr. Pompa for medications.     Progress on Treatment Objective(s) / Homework:  Minimal progress - PRECONTEMPLATION (Not seeing need for change); Intervened by educating the patient about the effects of current behavior on health.  Evoked information about reasons to continue behavior, express concern / recommendations, and explored any change talk    Motivational Interviewing    MI Intervention: Expressed Empathy/Understanding, Supported Autonomy, Collaboration, Evocation, Open-ended questions, Change talk (evoked) and Reframe     Change Talk Expressed by the Patient: Desire to change Ability to change Reasons to change Need to change    Provider Response to Change Talk: E - Evoked more info from patient about behavior change, A - Affirmed patient's thoughts, decisions, or  attempts at behavior change, R - Reflected patient's change talk and S - Summarized patient's change talk statements      Care Plan review completed: Yes    Medication Review:  No changes to current psychiatric medication(s)    Medication Compliance:  Yes    Changes in Health Issues:   None reported    Chemical Use Review:   Substance Use: Chemical use reviewed, no active concerns identified      Tobacco Use: No current tobacco use.      Assessment: Current Emotional / Mental Status (status of significant symptoms):  Risk status (Self / Other harm or suicidal ideation)  Patient denies a history of suicidal ideation, suicide attempts, self-injurious behavior, homicidal ideation, homicidal behavior and and other safety concerns  Patient denies current fears or concerns for personal safety.  Patient denies current or recent suicidal ideation or behaviors.  Patient denies current or recent homicidal ideation or behaviors.  Patient denies current or recent self injurious behavior or ideation.  Patient denies other safety concerns.  A safety and risk management plan has not been developed at this time, however patient was encouraged to call [place] [phone] should there be a change in any of these risk factors.    Appearance:   Appropriate   Eye Contact:   Good   Psychomotor Behavior: Normal   Attitude:   Cooperative   Orientation:   All  Speech   Rate / Production: Normal    Volume:  Normal   Mood:    Anxious  Depressed   Affect:    Appropriate   Thought Content:  Clear   Thought Form:  Coherent  Logical   Insight:    Good     Diagnoses:  1. Generalized anxiety disorder        Collateral Reports Completed:  Not Applicable    Plan: (Homework, other):  Patient was given information about behavioral services and encouraged to schedule a follow up appointment with the clinic [facility] in 1 week.  She was also given information about mental health symptoms and treatment options .  CD Recommendations: No indications of CD issues.   Erika Francisco MSW LICSW      ______________________________________________________________________    Integrated Primary Care Behavioral Health Treatment Plan    Patient's Name: Jolynn Haji  YOB: 1955    Date of Creation: 10/5/22  Date Treatment Plan Last Reviewed/Revised: 10/5/22    DSM5 Diagnoses: 300.02 (F41.1) Generalized Anxiety Disorder  Psychosocial / Contextual Factors: Individual Factors high anxiety associated with depressive sx, interfereing with ability to function as she would like.  .  PROMIS (reviewed every 90 days):     Referral / Collaboration:  Was/were discussed and patient will pursue. - individual therapy    Anticipated number of session for this episode of care: 5-8 sessions  Anticipation frequency of session: Weekly  Anticipated Duration of each session: 38-52 minutes  Treatment plan will be reviewed in 90 days or when goals have been changed.       MeasurableTreatment Goal(s) related to diagnosis / functional impairment(s)  Goal 1: Patient will work with provider to manage symptoms    I will know I've met my goal when when less anxious and feeling down.      Objective #A (Patient Action)    Patient will  attend all sessions.  Status: New - Date: 10/5/22     Intervention(s)  Therapist will teach educate patient on treatment options, clarify concerns, work with pt to overcome any resistance to compliance..    Objective #B  Patient will identify 3 fears / thoughts that contribute to feeling anxious.  Status: New - Date: 10/5/22     Intervention(s)  Therapist will educate patient on treatment options, clarify concerns, work with pt to overcome any resistance to compliance..    Objective #C  Patient will identify 3 initial signs or symptoms of anxiety.  Status: New - Date: 10/5/22     Intervention(s)  Therapist will educate patient on treatment options, clarify concerns, work with pt to overcome any resistance to compliance..        Patient has reviewed and agreed to the above  plan.      Erika Francisco, [facility]  October 5, 2022

## 2022-10-05 ENCOUNTER — VIRTUAL VISIT (OUTPATIENT)
Dept: BEHAVIORAL HEALTH | Facility: CLINIC | Age: 67
End: 2022-10-05
Payer: COMMERCIAL

## 2022-10-05 DIAGNOSIS — F41.1 GENERALIZED ANXIETY DISORDER: Primary | ICD-10-CM

## 2022-10-05 PROCEDURE — 90837 PSYTX W PT 60 MINUTES: CPT | Mod: 95 | Performed by: SOCIAL WORKER

## 2022-10-10 NOTE — PROGRESS NOTES
[facility]  October 14, 2022      Behavioral Health Clinician Progress Note    Patient Name: Jolynn Haji           Service Type:  Individual      Service Location:   MyChart / Email (patient reached)     Session Start Time: 9:59am  Session End Time: 10:50am      Session Length: 53 - 60      Attendees: Patient     Service Modality:  Video Visit:      Provider verified identity through the following two step process.  Patient provided:  Patient is known previously to provider    Telemedicine Visit: The patient's condition can be safely assessed and treated via synchronous audio and visual telemedicine encounter.      Reason for Telemedicine Visit: Patient has requested telehealth visit    Originating Site (Patient Location): Patient's home    Distant Site (Provider Location): [facility]    Consent:  The patient/guardian has verbally consented to: the potential risks and benefits of telemedicine (video visit) versus in person care; bill my insurance or make self-payment for services provided; and responsibility for payment of non-covered services.     Patient would like the video invitation sent by:  My Chart    Mode of Communication:  Video Conference via Amwell    As the provider I attest to compliance with applicable laws and regulations related to telemedicine.    Visit Activities (Refresh list every visit): [facility] Only    Diagnostic Assessment Date: 9/27/22  Treatment Plan Review Date: 10/5/22  See Flowsheets for today's PHQ-9 and TIFFANY-7 results  Previous PHQ-9:   PHQ-9 SCORE 8/25/2022 9/27/2022 9/29/2022   PHQ-9 Total Score - - -   PHQ-9 Total Score [facility] 7 (Mild depression) 6 (Mild depression) 7 (Mild depression)   PHQ-9 Total Score 7 6 7   Some encounter information is confidential and restricted. Go to Review Flowsheets activity to see all data.     Previous TIFFANY-7:   TIFFANY-7 SCORE 7/11/2022 8/25/2022 9/27/2022   Total  "Score - - -   Total Score 5 (mild anxiety) 8 (mild anxiety) 9 (mild anxiety)   Total Score 5 8 9       KARINA LEVEL:  KARINA Score (Last Two) 8/27/2010 2/22/2011   KARINA Raw Score 51 49   Activation Score 91.6 82.8   KARINA Level 4 4       DATA  Extended Session (60+ minutes): No  Interactive Complexity: No  Crisis: No  [facility] Patient: No    Treatment Objective(s) Addressed in This Session:  Target Behavior(s): anxiety and depression    Depressed Mood: Increase interest, engagement, and pleasure in doing things  Decrease frequency and intensity of feeling down, depressed, hopeless  Improve quantity and quality of night time sleep / decrease daytime naps  Feel less tired and more energy during the day   Improve diet, appetite, mindful eating, and / or meal planning  Identify negative self-talk and behaviors: challenge core beliefs, myths, and actions  Improve concentration, focus, and mindfulness in daily activities   Feel less fidgety, restless or slow in daily activities / interpersonal interactions  Anxiety: will experience a reduction in anxiety, will develop more effective coping skills to manage anxiety symptoms, will develop healthy cognitive patterns and beliefs and will increase ability to function adaptively    Current Stressors / Issues:  Met with pt this morning who is reporting anxiety has improved a bit since last visit. States she has been active in body scanning, incorporating meditation back into her life and use of imagery. Discussed continued engagement in anxiety provoking situations. Discussed attempting to observe anxiety in a non- mental and objective manner. States people around her have noticed an improvement in her sx however she continues to feel \"like a wreck inside.\" Finally, discussed examining situations in which she is attempting to have control over. Worked on identifying areas in which she has no or little control. Discussed attempts to let go. Does report some issues with sleep on fast " release Ambien. Pt has follow up appt with Dr. Pompa in just over a week. She has been taking long release Ambien which allows her to sleep longer throughout the night. Encouraged her to reach out to provider prior to appt if issues worsen.     10/5/22  Reports doing well today. She has been trying to remain social. She tends to have an initial panic stricken feeling when committing to something with others. Since last med adjustment appears more calmer. Discussed areas in her life which appear to cause most of her anxiety. One area is around situations she has little control over, such as the result of her mental health. Discussed focus on acceptance of current state of mental health and focus on things she does have control over such as active steps she is taking to help her mental health. Discussed deep breathing exercise as a way of calming self with highly anxious. Introduced progressive muscle relaxation. Briefly discussed getting into a practice of meditation again. Finally, discussed finding frequent exercise as a part of anxiety. This is a bit more difficult as she has a bad knee which has been limiting for her. Pt is committed to trying out tech. Prior to next session.        9/27/22 DA  Met with pt for initial assessment. Reports several years ago she had a significant head injury which resulted in her inability to sleep. She reports anxiety followed shortly after. Since April of this year she has been experiencing more depressive sx. Her anxiety is mostly around wanting to feel better from her anxiety and not having positive results. Some of the depressive sx she has been having is poor appetite, lack of interest, struggle with focus, low energy, and has had on very rare occasions fleeting thoughts of wishing she were dead to end the pain of depression. Her anxiety sx often are in the form of nervousness, restlessness, and rumination. She used to have panic attacks each morning, however this has subsided  for several months now. She has not attempted therapy before. She would like to gain skills relating to managing anxiety. She currently is working with Dr. Pompa for medications.     Progress on Treatment Objective(s) / Homework:  Minimal progress - PRECONTEMPLATION (Not seeing need for change); Intervened by educating the patient about the effects of current behavior on health.  Evoked information about reasons to continue behavior, express concern / recommendations, and explored any change talk    Motivational Interviewing    MI Intervention: Expressed Empathy/Understanding, Supported Autonomy, Collaboration, Evocation, Open-ended questions, Change talk (evoked) and Reframe     Change Talk Expressed by the Patient: Desire to change Ability to change Reasons to change Need to change    Provider Response to Change Talk: E - Evoked more info from patient about behavior change, A - Affirmed patient's thoughts, decisions, or attempts at behavior change, R - Reflected patient's change talk and S - Summarized patient's change talk statements      Care Plan review completed: Yes    Medication Review:  No changes to current psychiatric medication(s)    Medication Compliance:  Yes    Changes in Health Issues:   None reported    Chemical Use Review:   Substance Use: Chemical use reviewed, no active concerns identified      Tobacco Use: No current tobacco use.      Assessment: Current Emotional / Mental Status (status of significant symptoms):  Risk status (Self / Other harm or suicidal ideation)  Patient denies a history of suicidal ideation, suicide attempts, self-injurious behavior, homicidal ideation, homicidal behavior and and other safety concerns  Patient denies current fears or concerns for personal safety.  Patient denies current or recent suicidal ideation or behaviors.  Patient denies current or recent homicidal ideation or behaviors.  Patient denies current or recent self injurious behavior or ideation.  Patient  denies other safety concerns.  A safety and risk management plan has not been developed at this time, however patient was encouraged to call [place] [phone] should there be a change in any of these risk factors.    Appearance:   Appropriate   Eye Contact:   Good   Psychomotor Behavior: Normal   Attitude:   Cooperative   Orientation:   All  Speech   Rate / Production: Normal    Volume:  Normal   Mood:    Anxious  Depressed   Affect:    Appropriate   Thought Content:  Clear   Thought Form:  Coherent  Logical   Insight:    Good     Diagnoses:  1. Generalized anxiety disorder        Collateral Reports Completed:  Not Applicable    Plan: (Homework, other):  Patient was given information about behavioral services and encouraged to schedule a follow up appointment with the clinic [facility] in 1 week.  She was also given information about mental health symptoms and treatment options .  CD Recommendations: No indications of CD issues.  Erika Francisco MSW LICSW      ______________________________________________________________________    Integrated Primary Care Behavioral Health Treatment Plan    Patient's Name: Jolynn Haji  YOB: 1955    Date of Creation: 10/5/22  Date Treatment Plan Last Reviewed/Revised: 10/5/22    DSM5 Diagnoses: 300.02 (F41.1) Generalized Anxiety Disorder  Psychosocial / Contextual Factors: Individual Factors high anxiety associated with depressive sx, interfereing with ability to function as she would like.  .  PROMIS (reviewed every 90 days):     Referral / Collaboration:  Was/were discussed and patient will pursue. - individual therapy    Anticipated number of session for this episode of care: 5-8 sessions  Anticipation frequency of session: Weekly  Anticipated Duration of each session: 38-52 minutes  Treatment plan will be reviewed in 90 days or when goals have been changed.       MeasurableTreatment Goal(s) related to diagnosis / functional impairment(s)  Goal 1: Patient will work  with provider to manage symptoms    I will know I've met my goal when when less anxious and feeling down.      Objective #A (Patient Action)    Patient will  attend all sessions.  Status: New - Date: 10/5/22     Intervention(s)  Therapist will teach educate patient on treatment options, clarify concerns, work with pt to overcome any resistance to compliance..    Objective #B  Patient will identify 3 fears / thoughts that contribute to feeling anxious.  Status: New - Date: 10/5/22     Intervention(s)  Therapist will educate patient on treatment options, clarify concerns, work with pt to overcome any resistance to compliance..    Objective #C  Patient will identify 3 initial signs or symptoms of anxiety.  Status: New - Date: 10/5/22     Intervention(s)  Therapist will educate patient on treatment options, clarify concerns, work with pt to overcome any resistance to compliance..        Patient has reviewed and agreed to the above plan.      DINESH Whipple  October 14, 2022

## 2022-10-14 ENCOUNTER — VIRTUAL VISIT (OUTPATIENT)
Dept: BEHAVIORAL HEALTH | Facility: CLINIC | Age: 67
End: 2022-10-14
Payer: COMMERCIAL

## 2022-10-14 DIAGNOSIS — F41.1 GENERALIZED ANXIETY DISORDER: Primary | ICD-10-CM

## 2022-10-14 PROCEDURE — 90834 PSYTX W PT 45 MINUTES: CPT | Mod: 95 | Performed by: SOCIAL WORKER

## 2022-10-24 ENCOUNTER — VIRTUAL VISIT (OUTPATIENT)
Dept: BEHAVIORAL HEALTH | Facility: CLINIC | Age: 67
End: 2022-10-24
Payer: COMMERCIAL

## 2022-10-24 ENCOUNTER — VIRTUAL VISIT (OUTPATIENT)
Dept: PSYCHIATRY | Facility: CLINIC | Age: 67
End: 2022-10-24
Payer: COMMERCIAL

## 2022-10-24 DIAGNOSIS — G47.00 INSOMNIA, UNSPECIFIED TYPE: ICD-10-CM

## 2022-10-24 DIAGNOSIS — F39 MOOD DISORDER (H): ICD-10-CM

## 2022-10-24 DIAGNOSIS — Z87.820 HISTORY OF TRAUMATIC BRAIN INJURY: ICD-10-CM

## 2022-10-24 DIAGNOSIS — F41.1 GENERALIZED ANXIETY DISORDER: Primary | ICD-10-CM

## 2022-10-24 DIAGNOSIS — F41.1 GAD (GENERALIZED ANXIETY DISORDER): Primary | ICD-10-CM

## 2022-10-24 DIAGNOSIS — G21.19 OTHER DRUG-INDUCED SECONDARY PARKINSONISM (H): ICD-10-CM

## 2022-10-24 PROCEDURE — 99214 OFFICE O/P EST MOD 30 MIN: CPT | Mod: 95 | Performed by: PSYCHIATRY & NEUROLOGY

## 2022-10-24 PROCEDURE — 90832 PSYTX W PT 30 MINUTES: CPT | Mod: 95 | Performed by: PSYCHOLOGIST

## 2022-10-24 RX ORDER — ZOLPIDEM TARTRATE 6.25 MG/1
6.25 TABLET, FILM COATED, EXTENDED RELEASE ORAL
Qty: 90 TABLET | Refills: 0 | Status: SHIPPED | OUTPATIENT
Start: 2022-10-24 | End: 2023-01-19

## 2022-10-24 RX ORDER — VENLAFAXINE HYDROCHLORIDE 150 MG/1
150 CAPSULE, EXTENDED RELEASE ORAL DAILY
Qty: 90 CAPSULE | Refills: 0 | Status: SHIPPED | OUTPATIENT
Start: 2022-10-24 | End: 2023-01-19 | Stop reason: DRUGHIGH

## 2022-10-24 NOTE — PATIENT INSTRUCTIONS
Treatment Plan:  Continue Ambien CR 6.25 mg at bedtime as needed for sleep. Do not take with alcohol or opioid pain medication. Make sure to have 8-10 hours to devote to sleep after taking. Careful/caution for falls in middle of night. Discussed sleep medicine referral but declined at this time. Could consider in future.   Increase Effexor-XR/venlafaxine ER to 150 mg daily for depression and anxiety.   Continue ondansetron/Zofran 4 mg every 8 hrs as needed for nausea caused by Effexor-XR changes.  Continue to follow with neurology as they recommend.    Continue all other cares per primary care provider.   Continue all other medications as reviewed per electronic medical record today.   Safety plan reviewed. To the Emergency Department as needed or call after hours crisis line at [phone] or [phone]. [place] Crisis Text Line. Text [place] to 003723 or Suicide LifeLine Chat: suicidepreventionlifeline.org/chat  Consider individual psychotherapy for additional support and ongoing development of nonpharmacologic coping skills and strategies.  Schedule an appointment with me in 6 weeks or sooner as needed. Call [facility] at [phone] to schedule.  Follow up with primary care provider as planned or for acute medical concerns.  Call the psychiatric nurse line with medication questions or concerns at [phone].  MyChart may be used to communicate with your provider, but this is not intended to be used for emergencies.

## 2022-10-24 NOTE — Clinical Note
Please call this patient to get them scheduled for a follow-up visit in 6 weeks. Please schedule with me and the [facility] [place]. Thanks!

## 2022-10-24 NOTE — PROGRESS NOTES
[facility]  October 24, 2022      Behavioral Health Clinician Progress Note    Patient Name: Jolynn Haji           Service Type:  Individual      Service Location:   [facility]     Session Start Time: 10:35 am  Session End Time: 11:00 am      Session Length: 16 - 37      Attendees: Patient     Service Modality:  Video Visit:      Provider verified identity through the following two step process.  Patient provided:  Patient is known previously to provider    Telemedicine Visit: The patient's condition can be safely assessed and treated via synchronous audio and visual telemedicine encounter.      Reason for Telemedicine Visit: Services only offered telehealth    Originating Site (Patient Location): Patient's home    Distant Site (Provider Location): Provider Remote Setting- Home Office    Consent:  The patient/guardian has verbally consented to: the potential risks and benefits of telemedicine (video visit) versus in person care; bill my insurance or make self-payment for services provided; and responsibility for payment of non-covered services.     Patient would like the video invitation sent by:  My Chart    Mode of Communication:  Video Conference via [facility]    As the provider I attest to compliance with applicable laws and regulations related to telemedicine.    Visit Activities (Refresh list every visit): [facility] Only    Diagnostic Assessment Date: 05/12/2022  Treatment Plan Review Date: 12/01/2022  See Flowsheets for today's PHQ-9 and TIFFANY-7 results  Previous PHQ-9:   PHQ-9 SCORE 8/25/2022 9/27/2022 9/29/2022   PHQ-9 Total Score - - -   PHQ-9 Total Score MyChart 7 (Mild depression) 6 (Mild depression) 7 (Mild depression)   PHQ-9 Total Score 7 6 7   Some encounter information is confidential and restricted. Go to Review Flowsheets activity to see all data.     Previous TIFFANY-7:   TIFFANY-7 SCORE 7/11/2022 8/25/2022 9/27/2022   Total Score - - -   Total Score 5 (mild  "anxiety) 8 (mild anxiety) 9 (mild anxiety)   Total Score 5 8 9       KARINA LEVEL:  KARINA Score (Last Two) 8/27/2010 2/22/2011   KARINA Raw Score 51 49   Activation Score 91.6 82.8   KARINA Level 4 4       DATA  Extended Session (60+ minutes): No  Interactive Complexity: No  Crisis: No  [facility] Patient: No    Treatment Objective(s) Addressed in This Session:  Target Behavior(s): anxiety    Anxiety: will develop more effective coping skills to manage anxiety symptoms    Current Stressors / Issues:  Reported that going to the quick release Ambien was not helpful for her. She was falling asleep quickly but not staying asleep. She would wake up within about 3 hours and could not go back to sleep. She did that for 3 nights and then went back to the Ambien CR which was better because she could get about 6 hours that way. She is noticing her anxiety is still present. It is better but \"I don't think it's normal. I'm anxious about things that I shouldn't feel nervous about.\" She is working with Erika to continue learning skills to manage the anxiety but is open to medication adjustments if it will help even further.      10/03/2022:  Reported that she continues to wake up with anxiety. Approximately 3 days a week, she has an experience of \"hitting a wall,\" feeling more fatigued, and her mind is \"scattered\" such that she goes from job to job without finishing any of them. She spoke about how she has been continuing to engage in activities \"but it takes a lot of effort sometimes.\" She acknowledged that the anxiety does get better after she gets started. She is starting therapy soon and hopes to learn more skills to manage her anxiety better.      09/01/2022 (Pily Betts, [facility]):  MH Update: still not sleeping well, getting nauseous and worried it could be related to the Ambien or possibly the Effexor. Mood continues to improve but \"anxious to feel better.\" Has been spending time with friends and finds that helpful. Has moments of feeling " "\"thick headed\" during the day and worries is related to Ambien. Energy has increased during the day, able to accomplish more. Better concentrate and comprehending what she does read. [facility] validated patient for her continued distress.  [facility] normalized patient needing to allow herself, her brain and her medication more time to adjust to all of these changes.  [facility] processed patient using kindness with herself and trying not to compare how she is doing now with how she was doing in the past.  [facility] discussed patient using the idea of pacing and focusing on doing something for a small period of time instead of feeling that she needs to accomplish the entire task.  [facility] also discussed patient allowing family to help and simplifying where she can.      Patient indicates she saw a neurologist this week in order to establish baseline.  She was told by that provider but there was nothing to worry about and not to have anxiety about her health.  Patient has follow-up in 3 months.  Patient is hopeful that the symptoms she was experiencing were related to side effects of Abilify and not something more.    SI: passive thoughts of wishing life would get better, how long can I live like this. Denies having a plan or intent. No prior attempts. Has people and things to live for. Able to redirect self and feels safe. Has supports she can reach out to and aware of crisis resources.    Tx: not in therapy, [facility] to place referral for [facility] and health psychologist    Sleep: able to fall asleep but then wakes up at 3 am and cant get back to sleep. Not noticing any anxious thoughts. Sometimes wakes up due to nausea and waits for it to pass.  [facility] used CBT I to review sleep practices and praised patient for having a healthy routine and relaxation time.  [facility] discussed safe space visualization and patient trying to use this when she wakes up to see if it assists with her getting back to sleep.  Appetite: improved, had lost 30 pounds and feels that is " "getting back to better place    Etoh: denies  Substance: denies  Nicotine: denies  Caffeine: 1 cup of coffee per day    Most Important: side effect of feeling nauseous and still not sleeping well, sometimes nausea that wakes up. Is there an Ambien slow release or something else to stop the nausea?      08/11/2022:  Reported that she continues to improve. She stated that she is no longer walking in a shuffle. Her anxiety is much better. She is no longer waking up with intense anxiety. She is more active, socializing more often, and she is very pleased with her progress. She has noticed that every 4 days or so she wakes up at night after 3 hours and feels rested. She will stay in bed but will plan the day. Sometimes she feels tired later in the day and sometimes she does not. She said she had a concussion 4 years ago and \"I did not sleep at all for about a month.\" She would like to know if Ambien is addictive or if there are other options that might not be addictive. She found the 2.5mg did not help as much as 5mg. She would also like to know if she can take something late at night when she cannot go back to sleep. She also has questions about whether the venlafaxine is causing her sleep problems at night. She would also like to discuss the plan for the Lexapro. We spoke about ways to learn some skills to better manage worry.      07/28/2022:  Reported that she was doing well until about 10 days ago when she started waking up with significant anxiety for several hours rating the intensity as 8/10. \"I've never had that before. I had to cancel coffee with my friend because I was so anxious.\" She described the anxiety as her heart racing and breathing heavy. She notices that the anxiety is better in the afternoon and gone by the evening. Mirtazapine helped initially for 2 days, but not as much now. The increase in Lexapro has been fine. She would like to know why she might be waking up with this kind of anxiety in the " "mornings and when it might end.       06/14/2022:  Reported that she has not been able to notice a difference by lowering the Abilify. Reported that she is still feeling depressed and does not feel it has gotten any better/worse than when she first started with [facility]PS. She is waking up with cramps. She would like to know how long it will take for her to feel better and would like to know how much she needs to push herself to do things. She spoke about going to lunch with some friends and had some anticipation anxiety. She worried that she would not be able to stay focused on the conversation and participate. The anxiety did go away when she got there but she felt tired and had to leave about an hour later which is unusual as they usually have lunch for about 3 hours.       05/24/2022:  Reported that she can focus \"a little longer than before\" and she feels she is \"staying in the present\" rather than worrying about the next thing to come. Her sleep is a little better. Instead of waking up at 1:30am, she sleeps until about 4:30am, uses the bathroom, and she can go back to sleep. She still feels tired during the day and estimated that it is about the same as before. She said that she is able to complete tasks. \"I won't win any races but I can get them done now.\" She noted that she had no concerns about her current medications.      05/12/2022 (DA):  \"I'm hoping we can figure out what triggered this setback.\" She was diagnosed with anxiety and depression after a concussion a concussion in 04/2017. She had been sick at the time, got up one night and fell. She is uncertain where on her head she landed. She was on Lexapro, mirtazapine, and seroquel. She thought that it was \"fixed\", stopped taking the mirtazapine and seroquel but stayed on the lexapro. In January, started feeling more anxiety. PCP added Abilify which was helpful. The Lexapro was increased which she is not sure if it has been helpful (been on it for 4 weeks). " "She said, \"I feel like I'm in [place] land. I have a hard time thinking things through.\" Her anxiety and sleep is better but bothered by the slowed thinking.       Progress on Treatment Objective(s) / Homework:  Minimal progress - ACTION (Actively working towards change); Intervened by reinforcing change plan / affirming steps taken    Also provided psychoeducation about behavioral health condition, symptoms, and treatment options    Care Plan review completed: Yes    Medication Review:  Changes to psychiatric medications, see updated Medication List in EPIC.     Medication Compliance:  Yes    Changes in Health Issues:   Yes: Sleep disturbance, Associated Psychological Distress    Chemical Use Review:   Substance Use: Chemical use reviewed, no active concerns identified      Tobacco Use: No current tobacco use.      Assessment: Current Emotional / Mental Status (status of significant symptoms):  Risk status (Self / Other harm or suicidal ideation)  Patient denies a history of suicidal ideation, suicide attempts, self-injurious behavior, homicidal ideation, homicidal behavior and and other safety concerns  Patient denies current fears or concerns for personal safety.  Patient reports the following current or recent suicidal ideation or behaviors: Patient reports passive thoughts about questioning life but denies that she was suicidal.  Patient does feel safe.  Patient denies current or recent homicidal ideation or behaviors.  Patient denies current or recent self injurious behavior or ideation.  Patient denies other safety concerns.  A safety and risk management plan has not been developed at this time, however patient was encouraged to call [facility] should there be a change in any of these risk factors.    Appearance:   Appropriate   Eye Contact:   Good   Psychomotor Behavior: Normal   Attitude:   Cooperative   Orientation:   All  Speech   Rate / Production: Normal    Volume:  Normal   Mood:    Anxious "   Affect:    Appropriate   Thought Content:  Clear   Thought Form:  Coherent  Logical   Insight:    Fair     Diagnoses:  1. Generalized anxiety disorder    2. Insomnia, unspecified type    3. Mood disorder (H)      Collateral Reports Completed:  Communicated with: Dr. Pompa    Plan: (Homework, other):  Patient was given information about behavioral services and encouraged to schedule a follow up appointment with the clinic [facility]  She was also given information about mental health symptoms and treatment options .  CD Recommendations: No indications of CD issues. Eran Landon PsyD, LP      ______________________________________________________________________    [facility] - [place]: Treatment Plan    Patient's Name: Jolynn Haji  YOB: 1955    Date of Creation: May 24, 2022  Date Treatment Plan Last Reviewed/Revised: 09/01/2022    DSM5 Diagnoses: 300.02 (F41.1) Generalized Anxiety Disorder or 780.52 (G47.00) Insomnia Disorder   With non-sleep disorder mental comorbidity  Persistent    Psychosocial / Contextual Factors: family    PROMIS (reviewed every 90 days):   PROMIS 10-Global Health (only subscores and total score):   PROMIS-10 Scores Only 5/12/2022 5/12/2022 7/1/2022 9/27/2022 9/27/2022   Global Mental Health Score 9 9 17 10 10   Global Physical Health Score 17 17 13 17 17   PROMIS TOTAL - SUBSCORES 26 26 30 27 27       Referral / Collaboration:  The following referral(s) will be initiated: [facility] to place referral for either [facility] or health psychology services.    Anticipated number of session for this episode of care: 5-6  Anticipation frequency of session: As determined by Dr. Pompa  Anticipated Duration of each session: 16-37 minutes  Treatment plan will be reviewed in 90 days or when goals have been changed.       MeasurableTreatment Goal(s) related to diagnosis / functional impairment(s)  Goal 1: Patient will work  with providers to manage symptoms    I will know I've met my goal when I can finish my tasks.      Objective #A (Patient Action)  Patient will attend all appointments, take medication as prescribed.  Status: Continued - Date(s):  9/01/2022    Intervention(s)  [facility] will Monitor and assist in overcoming barriers to treatment adherence    Objective #B  Patient will consider all recommendations offered.  Status: Continued - Date(s): 09/01/2022     Intervention(s)  [facility] will educate patient on treatment options, clarify concerns, work with pt to overcome any resistance to compliance.      Patient has reviewed and agreed to the above plan.      Anthony Landon PsyD  10/24/2022

## 2022-10-24 NOTE — PROGRESS NOTES
"Telemedicine Visit: The patient's condition can be safely assessed and treated via synchronous audio and visual telemedicine encounter.      Reason for Telemedicine Visit: Patient has requested telehealth visit    Originating Site (Patient Location): Patient's home    Distant Location (provider location):  Off-site    Consent:  The patient/guardian has verbally consented to: the potential risks and benefits of telemedicine (video visit) versus in person care; bill my insurance or make self-payment for services provided; and responsibility for payment of non-covered services.     Mode of Communication:  Video Conference via [employer]    As the provider I attest to compliance with applicable laws and regulations related to telemedicine.        Outpatient Psychiatric Progress Note    Name: Jolynn Haji   : 1955                    Primary Care Provider: Helga Ibarra MD   Therapist[facility]    PHQ-9 scores:  PHQ-9 SCORE 2022   PHQ-9 Total Score - - -   PHQ-9 Total Score MyChart 7 (Mild depression) 6 (Mild depression) 7 (Mild depression)   PHQ-9 Total Score 7 6 7   Some encounter information is confidential and restricted. Go to Review Flowsheets activity to see all data.       TIFFANY-7 scores:  TIFFANY-7 SCORE 2022   Total Score - - -   Total Score 5 (mild anxiety) 8 (mild anxiety) 9 (mild anxiety)   Total Score 5 8 9       Patient Identification:  Patient is a 67 year old,   White Choose not to answer female  who presents for return visit with me.  Patient is currently retired. Patient attended the phone/video session with her daughter Malini rucker. Patient prefers to be called: \" Azalea\".    Interim History:  I last saw Jolynn Haji for outpatient psychiatry return visit on 10/03/2022. During that appointment, we:     Change Ambien CR back to to Ambien IR (immediate release version) 2.5-5 mg at bedtime as needed for sleep. Do not " "take with alcohol or opioid pain medication. Make sure to have 8-10 hours to devote to sleep after taking. Careful/caution for falls in middle of night.  Discussed taking a trial off the medication if able, and if your anxiety improved some.    Increase Effexor-XR/venlafaxine ER to 112.5 mg daily for depression and anxiety (take 75 mg cap WITH 37.5 mg cap). Can continue to take at bedtime.    Recommend making the change with Effexor-XR first, and once used to the new dose of Effexor-XR, then change your Ambien CR back to Ambien IR.    Continue ondansetron/Zofran 4 mg every 8 hrs as needed for nausea caused by Effexor-XR changes.    Continue to follow with neurology as they recommend.      10/24: Patient overall with some improved anxiety symptoms on increased dose of Effexor-XR to 112.5 mg daily.  Tolerating well with no major negative side effects.  Ambien immediate release not as helpful as extended release so patient switched back to extended release.  Sleeping about 6 hours a night and feeling fairly well rested in the morning.  Talked with [facility] about sleep today.  Denies acute safety concerns.  No SI.  No problematic drug or alcohol use.  See below note for additional details.    Per [facility], Dr. Eran Landon, during today's team-based visit:  Reported that going to the quick release Ambien was not helpful for her. She was falling asleep quickly but not staying asleep. She would wake up within about 3 hours and could not go back to sleep. She did that for 3 nights and then went back to the Ambien CR which was better because she could get about 6 hours that way. She is noticing her anxiety is still present. It is better but \"I don't think it's normal. I'm anxious about things that I shouldn't feel nervous about.\" She is working with Erika to continue learning skills to manage the anxiety but is open to medication adjustments if it will help even further.       Past Psychiatric Med Trials:  Psych Meds at Intake:  lexapro " "20 mg daily  abilify 5 mg daily      Past Psych Meds:  Amitriptyline  Quetiapine  Mirtazapine - stopped \"because I was sleeping through the night\"    Psychiatric ROS:  Jolynn Haji reports mood has been: Okay  Anxiety has been: A little better on increased dose of Effexor-XR  Sleep has been: See HPI above  Seema sxs: None  Psychosis sxs: N/A  ADHD/ADD sxs: N/A  PTSD sxs: N/A  PHQ9 and GAD7 scores were reviewed today if completed.   Medication side effects: Denies any major negative side effects  Current stressors include: Symptoms and See HPI above  Coping mechanisms and supports include: Family, Hobbies and Friends    Current medications include:   Current Outpatient Medications   Medication Sig     clindamycin (CLINDAGEL) 1 % external gel Apply topically daily     lactobacillus rhamnosus, GG, (CULTURELL) capsule Take 1 capsule by mouth daily     lisinopril (ZESTRIL) 2.5 MG tablet Take 1 tablet (2.5 mg) by mouth daily     ondansetron (ZOFRAN ODT) 4 MG ODT tab Take 1 tablet (4 mg) by mouth every 8 hours as needed for nausea     venlafaxine (EFFEXOR XR) 37.5 MG 24 hr capsule Take 1 capsule (37.5 mg) by mouth daily Take WITH 75 mg cap for total daily dose 112.5 mg.     venlafaxine (EFFEXOR XR) 75 MG 24 hr capsule Take 1 capsule (75 mg) by mouth daily     zolpidem (AMBIEN) 5 MG tablet Take 0.5-1 tablets (2.5-5 mg) by mouth nightly as needed for sleep     Current Facility-Administered Medications   Medication     lidocaine 1 % 4 mL     lidocaine 1 % injection 3 mL     lidocaine 1 % injection 4 mL     triamcinolone (KENALOG-40) injection 40 mg     triamcinolone (KENALOG-40) injection 40 mg       Past Medical/Surgical History:  Past Medical History:   Diagnosis Date     Abnormal glandular Papanicolaou smear of cervix- ASCUS in 2000 12/5/2003    ASC H- 2000     Basal cell carcinoma of leg, right 05/2016    Dr Rogers     Colon polyps 05/2018    tubular adenoam - due 5 yrs - 2 mm     Concussion without loss of consciousness " 4/12/2017     Hidradenitis     left  groin     HSIL (high grade squamous intraepithelial lesion) on Pap smear of cervix 7/5/2000    Normal paps from 2001 to 2016/cc     Hypertension goal BP (blood pressure) < 140/90      OA (osteoarthritis) of knee     moderate     Postmenopausal since 7/2008      Shingles 7/23/2010    left      Squamous cell carcinoma of skin, unspecified      Synovial cyst of popliteal space       has a past medical history of Abnormal glandular Papanicolaou smear of cervix- ASCUS in 2000 (12/5/2003), Basal cell carcinoma of leg, right (05/2016), Colon polyps (05/2018), Concussion without loss of consciousness (4/12/2017), Hidradenitis, HSIL (high grade squamous intraepithelial lesion) on Pap smear of cervix (7/5/2000), Hypertension goal BP (blood pressure) < 140/90, OA (osteoarthritis) of knee, Postmenopausal (since 7/2008 ), Shingles (7/23/2010), Squamous cell carcinoma of skin, unspecified, and Synovial cyst of popliteal space.    She has no past medical history of Malignant melanoma (H) or Squamous cell carcinoma.    Social History:  Reviewed. No changes to social history except as noted above in HPI.    Vital Signs:   None. This is phone/video visit.     Labs:  Most recent laboratory results reviewed and the pertinent results include:   TSH   Date Value Ref Range Status   04/25/2022 1.30 0.40 - 4.00 mU/L Final   01/13/2021 2.07 0.40 - 4.00 mU/L Final     Lab Results   Component Value Date    WBC 8.1 04/25/2022    WBC 6.4 01/13/2021     Lab Results   Component Value Date    RBC 4.58 04/25/2022    RBC 4.54 01/13/2021     Lab Results   Component Value Date    HGB 13.6 04/25/2022    HGB 13.3 01/13/2021     Lab Results   Component Value Date    HCT 41.1 04/25/2022    HCT 41.7 01/13/2021     No components found for: MCT  Lab Results   Component Value Date    MCV 90 04/25/2022    MCV 92 01/13/2021     Lab Results   Component Value Date    MCH 29.7 04/25/2022    MCH 29.3 01/13/2021     Lab Results    Component Value Date    MCHC 33.1 04/25/2022    MCHC 31.9 01/13/2021     Lab Results   Component Value Date    RDW 12.9 04/25/2022    RDW 13.1 01/13/2021     Lab Results   Component Value Date     04/25/2022     01/13/2021     Last Comprehensive Metabolic Panel:  Sodium   Date Value Ref Range Status   04/25/2022 137 133 - 144 mmol/L Final   01/13/2021 140 133 - 144 mmol/L Final     Potassium   Date Value Ref Range Status   04/25/2022 4.4 3.4 - 5.3 mmol/L Final   01/13/2021 4.1 3.4 - 5.3 mmol/L Final     Chloride   Date Value Ref Range Status   04/25/2022 105 94 - 109 mmol/L Final   01/13/2021 108 94 - 109 mmol/L Final     Carbon Dioxide   Date Value Ref Range Status   01/13/2021 28 20 - 32 mmol/L Final     Carbon Dioxide (CO2)   Date Value Ref Range Status   04/25/2022 26 20 - 32 mmol/L Final     Anion Gap   Date Value Ref Range Status   04/25/2022 6 3 - 14 mmol/L Final   01/13/2021 4 3 - 14 mmol/L Final     Glucose   Date Value Ref Range Status   04/25/2022 104 (H) 70 - 99 mg/dL Final   01/13/2021 91 70 - 99 mg/dL Final     Comment:     Fasting specimen     Urea Nitrogen   Date Value Ref Range Status   04/25/2022 23 7 - 30 mg/dL Final   01/13/2021 18 7 - 30 mg/dL Final     Creatinine   Date Value Ref Range Status   04/25/2022 0.78 0.52 - 1.04 mg/dL Final   01/13/2021 0.68 0.52 - 1.04 mg/dL Final     GFR Estimate   Date Value Ref Range Status   04/25/2022 83 >60 mL/min/1.73m2 Final     Comment:     Effective December 21, 2021 eGFRcr in adults is calculated using the 2021 CKD-EPI creatinine equation which includes age and gender (Mercedes koroma al., NEJM, DOI: 10.1056/AQPPic8485221)   01/13/2021 >90 >60 mL/min/[1.73_m2] Final     Comment:     Non  GFR Calc  Starting 12/18/2018, serum creatinine based estimated GFR (eGFR) will be   calculated using the Chronic Kidney Disease Epidemiology Collaboration   (CKD-EPI) equation.       Calcium   Date Value Ref Range Status   04/25/2022 9.3 8.5 -  10.1 mg/dL Final   01/13/2021 8.8 8.5 - 10.1 mg/dL Final     Bilirubin Total   Date Value Ref Range Status   04/25/2022 0.4 0.2 - 1.3 mg/dL Final   01/13/2021 0.8 0.2 - 1.3 mg/dL Final     Alkaline Phosphatase   Date Value Ref Range Status   04/25/2022 64 40 - 150 U/L Final   01/13/2021 72 40 - 150 U/L Final     ALT   Date Value Ref Range Status   04/25/2022 21 0 - 50 U/L Final   01/13/2021 17 0 - 50 U/L Final     AST   Date Value Ref Range Status   04/25/2022 11 0 - 45 U/L Final   01/13/2021 13 0 - 45 U/L Final     Review of Systems:  10 systems (general, cardiovascular, respiratory, eyes, ENT, endocrine, GI, , M/S, neurological) were reviewed. Most pertinent finding(s) is/are: Some chronic pains, see HPI above. The remaining systems are all unremarkable.    Mental Status Examination (limited as this is by phone/video):  Appearance: Awake, alert, appears stated age, no acute distress, well-groomed   Attitude:  cooperative, pleasant   Motor: Psychomotor slowing, not formally tested  Oriented to:  person, place, time, and situation  Attention Span and Concentration:  normal  Speech:  Still a little delayed/hesitant responses- improved from intake but still delayed/hesitant, normal volume  Language: intact  Mood: A little better  Affect: A little restricted still but overall mood congruent  Associations:  no loose associations  Thought Process:  logical, linear and goal oriented  Thought Content:  no evidence of suicidal ideation or homicidal ideation, no evidence of psychotic thought, no auditory hallucinations present and no visual hallucinations present  Recent and Remote Memory:  Not formally assessed. Possibly some deficits? Could consider neuropsychological testing  Fund of Knowledge: appropriate  Insight: Good  Judgment:  intact, adequate for safety  Impulse Control:  intact    Suicide Risk Assessment:  Today Jolynn LAURO Haji reports no suicidal ideation. Based on all available evidence including the factors cited  above, Jolynn Haji does not appear to be at imminent risk for self-harm, does not meet criteria for a 72-hr hold, and therefore remains appropriate for ongoing outpatient level of care.  A thorough assessment of risk factors related to suicide and self-harm have been reviewed and are noted above. The patient convincingly denies suicidality on several occasions. Local community safety resources reviewed for patient to use if needed. There was no deceit detected, and the patient presented in a manner that was believable.     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  >> Colpo: Neg  2001 NIL   2003 NIL pap, Neg HPV  1733-2218, 2016  NIL pap  7/25/19 NIL pap, Neg HPV    Criteria necessary to stop screening per ASCCP guidelines:  Older than 65 years  Three consecutive negative cytology results or two consecutive negative cotest results within the previous 10 years, with the most recent being performed within the last 5 years.   (Women with hx of MADDIE 2 or 3, or adenocarcinoma in situ should continue screening for full 20 years, even if this goes past age 65).         Hyperlipidemia LDL goal <130 11/30/2018     Priority: Medium     Basal cell carcinoma      Priority: Medium     Colon polyps 05/01/2018     Priority: Medium     Primary localized osteoarthrosis of left lower leg 02/19/2018      Priority: Medium     Other secondary osteoarthritis of left knee 01/29/2018     Priority: Medium     Chronic constipation 09/14/2017     Priority: Medium     Raynaud's phenomenon without gangrene 09/14/2017     Priority: Medium     Insomnia, unspecified type 07/25/2017     Priority: Medium     Generalized anxiety disorder 05/23/2017     Priority: Medium     Adjustment disorder with mixed anxiety and depressed mood- mild-moderate  at this time 05/11/2017     Priority: Medium     Family history of celiac disease 05/11/2017     Priority: Medium     Basal cell carcinoma of leg, right 05/01/2016     Priority: Medium     Sun-damaged skin 04/21/2016     Priority: Medium     Seasonal allergic rhinitis 04/21/2016     Priority: Medium     Dupuytren's disease of palm - right 4th digit flexor tendon 06/11/2015     Priority: Medium     History of migraine headaches- assoc. with nausea/vomiting - resolved around menopause - were  premenstrual  01/08/2014     Priority: Medium     Rosacea 05/01/2013     Priority: Medium     Numbness of feet- distal feet R>L  02/21/2011     Priority: Medium     NUMBNESS AND TINGLING OF FOOT - bilateral -mild  08/25/2010     Priority: Medium     Osteopenia 01/02/2009     Priority: Medium     Lipidosis 10/24/2006     Priority: Medium     Problem list name updated by automated process. Provider to review       Localized osteoarthritis of hand 11/01/2005     Priority: Medium     Problem list name updated by automated process. Provider to review       Stress incontinence, female - mild 10/23/2012     Priority: Low     Cough 10/23/2012     Priority: Low     Acute bronchitis- recurrent- ? related to mold in school building- pt has since retired and no further bronchitis 10/23/2012     Priority: Low     Postmenopausal      Priority: Low     DERMATOPHYTOSIS OF NAIL- toenails  03/14/2006     Priority: Low     trichophyton rubrum on culture - 1/06        Synovial cyst of popliteal space      Priority: Low      Dianelys 12/05/2003     Priority: Low       Psychosocial & Contextual Factors: see HPI above    Assessment:  From Intake, 5/12/2022:  Jolynn Haji is a 66-year-old female with a history including anxiety, depression, insomnia status post concussion in April 2017.  No major mental health struggles prior to concussion in 2017.  Patient was started on Lexapro, quetiapine, mirtazapine to help with her symptoms.  Patient had been feeling much better on that medication combination and once she was sleeping well again quetiapine and mirtazapine were discontinued.  This past January the patient started to have some worsening symptoms and Abilify was added.  Patient has felt like her current medications of Lexapro and Abilify have been helpful but reports starting to feel really slowed and numb the past several weeks.  She also reported a weight loss of nearly 20 pounds over the last 6 weeks versus several months (patient was not very sure). I am wondering if she could be experiencing side effects of her increased Lexapro dose.  She is agreeable to decreasing her dose slightly to 15 mg daily.  Could also consider decreasing Abilify in the case it is not her Lexapro.  She also desires to sleep a little bit better than she has been.  Could consider restarting mirtazapine at bedtime for sleep and to also augment her Lexapro.  Could also be beneficial for patient to undergo cognitive screening to check in and see what her baseline is status post concussion.  No acute safety concerns.  No SI.  No problematic drug or alcohol use.    5/24/2022:  Patient with some slight improvements of possible medication related side effects after decreasing Lexapro.  I am wondering if there might be more robust improvements with decreasing Abilify/aripiprazole.  Possible drug-induced Parkinsonism?  She does seem to have delayed/slowed speech, masked facies.  Her gait and other movements unable to be observed but she does report other struggles  that may be consistent with such a presentation.  We are going to decrease her Abilify down to 2 mg daily and I will see her back in just a few weeks.  We will likely discontinue her Abilify at her next visit.  I will message her primary care provider to get some collateral regarding previous baseline functioning prior to starting Abilify.  History of TBI could be confounding presentation.  Could consider neurology consult.    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  Instructed to stop Abilify today.  We will monitor for improvement of symptoms.  If symptoms do not improve may need to refer to neurology.  If symptoms start to improve we will consider low-dose methylphenidate augmentation of Lexapro next visit to help with low energy, mood, focus and concentration, cognition, s/p TBI if still clinically indicated.  We would discuss risks and benefits with patient and her daughter at that time.  No other acute safety concerns or SI.  No problematic drug or alcohol use.  If patient starts to struggle with sleep would restart mirtazapine at bedtime.    7/28/2022:  Pt still with sxs consistent with drug-induced Parksinsonism.  Neurology consult will be placed so that patient might get scheduled sooner than later due to the typically long wait.  We will start Ambien in hopes we can get her sleeping much better.  Discussed risks and benefits of its use, particularly in the geriatric population.  Lexapro has not been as helpful as it helped for her anxiety.  We will taper this medication and start Effexor/venlafaxine in hopes that might be more effective for her symptoms.  No acute safety concerns.  No SI.  No problematic drug or alcohol use.    8/11/2022:  Overall with continued improved symptoms.  Hopefully patient will continue to have improvement of her symptoms off of Abilify.  Patient will be seeing neurology soon.  Encouraged to keep appointment.  We will continue taper off of  Lexapro and continue on monotherapy with venlafaxine.  We will also continue Ambien at this time for sleep since patient is sleeping much better on the medication with improved symptoms and tolerating well.  No acute safety concerns.  No problematic drug or alcohol use.    9/1/2022:  Ongoing anxiety, still intense at times.  Still not sleeping very well.  Could consider sleep evaluation, but patient quite overwhelmed right now with various referrals and doctors appointments.  Briseyda had been working quite well but not keeping her sleep now.  Still falling asleep relatively well.  We will transition to Ambien CR to see if that is more helpful.  We will continue to consider sleep evaluation to rule out possibility of REM behavior sleep disorder.  We will also transition venlafaxine to bedtime to see if that helps with any possible nausea.  Working with neurology to monitor for Parkinson's disease.  I do recommend neuropsychological testing due to ongoing possible cognitive difficulties/struggles.  No acute safety concerns.  No SI.  No problematic drug or alcohol use.    10/3/2022:  Patient overall with some ongoing anxiety but slowly improving since it got worse late August.  Recommend continuing to optimize Effexor-XR, especially since patient tolerating well.  No longer having any nausea now that dose is taken at bedtime.  Patient feeling a little foggy and off balance with Ambien CR and so we will switch back to Ambien immediate release.  No falls.  Recommend increasing Effexor-XR first and once well tolerated switching back to Ambien immediate release.  May need to continue to work on alternative options for sleep if does not do well back on Ambien immediate release.  No acute safety concerns.  No SI.  No problematic drug or alcohol use.  Encouraged to continue working with neurology.  Next appointment in November.    10/24/2022:  Patient overall with some improved anxiety on increased dose of Effexor-XR.  Could  use additional improvement of anxiety and so we will continue to optimize therapy with Effexor-XR.  We will increase dose to 150 mg daily.  Tolerating well with no notable negative side effects.  Patient transitioned back to Ambien CR and is sleeping better on this compared to Ambien immediate release.  We will continue Ambien CR for sleep.  Discussed possibility of sleep medicine referral but patient declines at this time.  Could consider in the future.  No acute safety concerns.  No SI.  No problematic drug or alcohol use.  Encouraged to continue working with neurology.    Medication side effects and alternatives were reviewed. Health promotion activities recommended and reviewed today. All questions addressed. Education and counseling completed regarding risks and benefits of medications and psychotherapy options. Recommend therapy for additional support.     Treatment Plan:    Continue Ambien CR 6.25 mg at bedtime as needed for sleep. Do not take with alcohol or opioid pain medication. Make sure to have 8-10 hours to devote to sleep after taking. Careful/caution for falls in middle of night. Discussed sleep medicine referral but declined at this time. Could consider in future.     Increase Effexor-XR/venlafaxine ER to 150 mg daily for depression and anxiety.     Continue ondansetron/Zofran 4 mg every 8 hrs as needed for nausea caused by Effexor-XR changes.    Continue to follow with neurology as they recommend.      Continue all other cares per primary care provider.     Continue all other medications as reviewed per electronic medical record today.     Safety plan reviewed. To the Emergency Department as needed or call after hours crisis line at [phone] or [phone]. [place] Crisis Text Line. Text [place] to 959887 or Suicide LifeLine Chat: suicidepreventionlifeline.org/chat    Consider individual psychotherapy for additional support and ongoing development of nonpharmacologic coping skills and  strategies.    Schedule an appointment with me in 6 weeks or sooner as needed. Call [facility] at [phone] to schedule.    Follow up with primary care provider as planned or for acute medical concerns.    Call the psychiatric nurse line with medication questions or concerns at [phone].    [employer] may be used to communicate with your provider, but this is not intended to be used for emergencies.    Administrative Billing:   Phone Call/Video Duration: 16 Minutes  Start: 11:08a  Stop: 11:24a    Patient Status:  Patient will continue to be seen for ongoing consultation and stabilization.    Signed:   Judy Pompa DO  [facility]    Disclaimer: This note consists of symbols derived from keyboarding, dictation and/or voice recognition software. As a result, there may be errors in the script that have gone undetected. Please consider this when interpreting information found in this chart.

## 2022-10-28 ENCOUNTER — OFFICE VISIT (OUTPATIENT)
Dept: FAMILY MEDICINE | Facility: CLINIC | Age: 67
End: 2022-10-28
Payer: COMMERCIAL

## 2022-10-28 VITALS
HEART RATE: 110 BPM | BODY MASS INDEX: 19.46 KG/M2 | SYSTOLIC BLOOD PRESSURE: 128 MMHG | HEIGHT: 68 IN | WEIGHT: 128.4 LBS | TEMPERATURE: 98.4 F | RESPIRATION RATE: 16 BRPM | OXYGEN SATURATION: 99 % | DIASTOLIC BLOOD PRESSURE: 72 MMHG

## 2022-10-28 DIAGNOSIS — F41.1 GENERALIZED ANXIETY DISORDER: ICD-10-CM

## 2022-10-28 DIAGNOSIS — F51.02 ADJUSTMENT INSOMNIA: ICD-10-CM

## 2022-10-28 DIAGNOSIS — F43.23 ADJUSTMENT DISORDER WITH MIXED ANXIETY AND DEPRESSED MOOD: Primary | ICD-10-CM

## 2022-10-28 DIAGNOSIS — R41.843: ICD-10-CM

## 2022-10-28 PROCEDURE — 99214 OFFICE O/P EST MOD 30 MIN: CPT | Performed by: FAMILY MEDICINE

## 2022-10-28 ASSESSMENT — ANXIETY QUESTIONNAIRES
4. TROUBLE RELAXING: NOT AT ALL
3. WORRYING TOO MUCH ABOUT DIFFERENT THINGS: NEARLY EVERY DAY
GAD7 TOTAL SCORE: 7
GAD7 TOTAL SCORE: 7
6. BECOMING EASILY ANNOYED OR IRRITABLE: NOT AT ALL
7. FEELING AFRAID AS IF SOMETHING AWFUL MIGHT HAPPEN: NOT AT ALL
2. NOT BEING ABLE TO STOP OR CONTROL WORRYING: SEVERAL DAYS
GAD7 TOTAL SCORE: 7
7. FEELING AFRAID AS IF SOMETHING AWFUL MIGHT HAPPEN: NOT AT ALL
5. BEING SO RESTLESS THAT IT IS HARD TO SIT STILL: NOT AT ALL
8. IF YOU CHECKED OFF ANY PROBLEMS, HOW DIFFICULT HAVE THESE MADE IT FOR YOU TO DO YOUR WORK, TAKE CARE OF THINGS AT HOME, OR GET ALONG WITH OTHER PEOPLE?: SOMEWHAT DIFFICULT
IF YOU CHECKED OFF ANY PROBLEMS ON THIS QUESTIONNAIRE, HOW DIFFICULT HAVE THESE PROBLEMS MADE IT FOR YOU TO DO YOUR WORK, TAKE CARE OF THINGS AT HOME, OR GET ALONG WITH OTHER PEOPLE: SOMEWHAT DIFFICULT
1. FEELING NERVOUS, ANXIOUS, OR ON EDGE: NEARLY EVERY DAY

## 2022-10-28 ASSESSMENT — PATIENT HEALTH QUESTIONNAIRE - PHQ9
SUM OF ALL RESPONSES TO PHQ QUESTIONS 1-9: 5
SUM OF ALL RESPONSES TO PHQ QUESTIONS 1-9: 5

## 2022-10-28 NOTE — PATIENT INSTRUCTIONS
[facility]  [address] [place] [address]  Office: [phone]   Fax:    [phone]     Focus on the things that bring comfort and odessa and let the rest go.     Think about trimming down on the details of the holidays that cause you stress.      Future Appointments 10/28/2022 - 4/26/2023        Date Visit Type Length Department Provider     10/31/2022 10:30 AM [facility] RETURN 60 min CS BEHAVIORAL HEALTH Kiefer, Leah, LICSW    Location Instructions:     Your appointment is at [facility] located in the [facility] at [address] [place] [address]. Please check in at the  in [address].              11/10/2022  9:00 AM [facility] 15 min  DERMATOLOGY Sheldon Rogers MD    Location Instructions:     [address] [place] [id number]              11/16/2022  1:00 PM MA SCREENING BILATERAL W/ AVERY 15 min  BREAST CENTER [email or web address]    Location Instructions:     [facility] [address], [address] [place] [address]   Parking  Breast Center customers can park in the lot adjacent to the entrance to the [facility].  Entrance and check-in location  Enter at the front entrance, under the canopy. Take the stairs or elevator from the main entrance to the 2nd floor. Please check-in for your appointment at the desk in the [facility] Center waiting area.              11/17/2022  9:00 AM MOHS 15 min  DERMATOLOGY Sheldon Rogers MD    Location Instructions:     [address] [place] [id number]              12/9/2022 10:[facility] AM [facility] min [facility] BEHAVIORAL Young, Kenneth Ray, PsyD    Location Instructions:     [facility] has 2 buildings on its campus. Please visit us at [address] [place] and enter the building with the numbers [id number] on it.               12/9/2022 10:30 AM  [facility] ADULT PSYCHIATRY RETURN 30 min  PSYCHIATRY Judy Pompa DO    Location Instructions:     [facility] has 2 buildings on its campus. Please visit us at [address] [place], [place] and enter the building with the numbers [id number] on it.               1/9/2023  3:00 PM ADULT PSYCHOTHERAPY NEW 60 min [facility] Paula Navarrete, PhD    Location Instructions:     Located in the Clinics and [facility] Center at [address] [place] [address]. For parking options, enter the [facility] /arrival plaza from [facility] and attendants can assist you based on your needs.  parking is available for those with limited mobility M-F from 7 a.m. to 5 p.m. Due to short staffing, we are unable to offer  to all patients/visitors. Visit [email or web address] for more details.  Self-parking:&nbsp;  West Lot: Located across from the main entrance, this is a convenient option for patients. Enter on [facility]. Parking attendants available most hours to assist.&nbsp;     [facility] Ramp: Enter at the [facility] SE entrance (one block north of the [facility] main entrance). Do not enter the ramp from [facility] - this entrance is not staffed and is further from the [facility] main entrance.              3/8/2023  2:40 PM ANNUAL WELLNESS 40 min  FAMILY PRACTICE Helga Ibarra MD    Location Instructions:     [facility] is located at [address] [facility] [address], [address]. Free parking is available; access the lot by turning north from [address] onto [facility], then west onto [facility].              3/17/2023 12:30 PM P NEUROPSYCH EVAL 240 min [facility] NEUROPSY[facility]OLOGY Kassie Ashton, PhD    Location Instructions:     Located in the [facility] at [address] [place] [address]. For parking options, enter the [facility] /arrival plaza from [facility] and attendants can assist you based on your needs.  parking is  available for those with limited mobility M-F from 7 a.m. to 5 p.m. Due to short staffing, we are unable to offer  to all patients/visitors. Visit [email or web address] for more details.  Self-parking:&nbsp;  West Lot: Located across from the main entrance, this is a convenient option for patients. Enter on [facility]. Parking attendants available most hours to assist.&nbsp;     [facility] Ramp: Enter at the [facility] SE entrance (one block north of the [facility] main entrance). Do not enter the ramp from [facility] - this entrance is not staffed and is further from the [facility] main entrance.

## 2022-10-28 NOTE — PROGRESS NOTES
Assessment & Plan       ICD-10-CM    1. Adjustment disorder with mixed anxiety and depressed mood- mild-moderate  at this time  F43.23       2. Adjustment insomnia  F51.02       3. Generalized anxiety disorder  F41.1       4. 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  Continue to see psychiatry as well.     Ordering of each unique test  Prescription drug management  30 minutes spent on the date of the encounter doing chart review, history and exam, documentation and further activities per the note       MEDICATIONS:  Continue current medications without change  Regular exercise  See Patient Instructions    No follow-ups on file.    Helga Ibarra MD  [facility]    Chanelle Tristan is a 67 year old accompanied by her , Zach, , presenting for the following health issues:  No chief complaint on file.      History of Present Illness       Mental Health Follow-up:  Patient presents to follow-up on Depression & Anxiety.Patient's depression since last visit has been:  Medium  The patient is not having other symptoms associated with depression.  Patient's anxiety since last visit has been:  Medium  The patient is not having other symptoms associated with anxiety.  Any significant life events: No  Patient is feeling anxious or having panic attacks.  Patient has no concerns about alcohol or drug use.    She eats 2-3 servings of fruits and vegetables daily.She consumes 0 sweetened beverage(s) daily.She exercises with enough effort to increase her heart rate 20 to 29 minutes per day.  She exercises with enough effort to increase her heart rate 7 days per week.   She is taking medications regularly.    Today's PHQ-9         PHQ-9 Total Score: 5    PHQ-9 Q9 Thoughts of better off dead/self-harm past 2 weeks :   Not at  all      Today's TIFFANY-7 Score: 7       Depression and Anxiety Follow-Up:     How are you doing with your depression since your last visit? Improved     How are you doing with your anxiety since your last visit?  Just increased dose on medication too soon to tell per pt.      Are you having other symptoms that might be associated with depression or anxiety? Yes:  panic attacks     Have you had a significant life event? No     Do you have any concerns with your use of alcohol or other drugs? No     Psychiatry increased her dosage on her medication - now up to 150mg daily.  Recognizes her anxiety now and works through it.      No more difficulty falling asleep. Doesn't always sleep through the night. Walking her dog more and longer than previous. Doing better on the ambien CR 6.25mg /day.      Continuing to improve slowly but surely.      Social History     Tobacco Use     Smoking status: Never     Smokeless tobacco: Never   Substance Use Topics     Alcohol use: Yes     Alcohol/week: 0.0 - 1.0 standard drinks     Comment: 2 per month     Drug use: No     Comment: no herbal meds either     PHQ 8/25/2022 9/27/2022 9/29/2022   PHQ-9 Total Score 7 6 7   Q9: Thoughts of better off dead/self-harm past 2 weeks Not at all Not at all Not at all   Some encounter information is confidential and restricted. Go to Review Flowsheets activity to see all data.     TIFFANY-7 SCORE 7/11/2022 8/25/2022 9/27/2022   Total Score - - -   Total Score 5 (mild anxiety) 8 (mild anxiety) 9 (mild anxiety)   Total Score 5 8 9     Last PHQ-9 10/28/2022   1.  Little interest or pleasure in doing things 0   2.  Feeling down, depressed, or hopeless 1   3.  Trouble falling or staying asleep, or sleeping too much 3   4.  Feeling tired or having little energy 1   5.  Poor appetite or overeating 0   6.  Feeling bad about yourself 0   7.  Trouble concentrating 0   8.  Moving slowly or restless 0   Q9: Thoughts of better off dead/self-harm past 2 weeks 0   PHQ-9  Total Score 5   Difficulty at work, home, or with people -   Some encounter information is confidential and restricted. Go to Review Flowsheets activity to see all data.     TIFFANY-7  10/28/2022   1. Feeling nervous, anxious, or on edge 3   2. Not being able to stop or control worrying 1   3. Worrying too much about different things 3   4. Trouble relaxing 0   5. Being so restless that it is hard to sit still 0   6. Becoming easily annoyed or irritable 0   7. Feeling afraid, as if something awful might happen 0   TIFFANY-7 Total Score 7   If you checked any problems, how difficult have they made it for you to do your work, take care of things at home, or get along with other people? Somewhat difficult           Concern - knee pain left  .  Steroid shot after last visit helped quite a bit.  Now walking a mile , feels little tight later that night.  Feels better with movement.    Onset: ongoing for years   Description: pain when bending   Intensity: moderate  Progression of Symptoms:  worsening  Accompanying Signs & Symptoms: no   Previous history of similar problem: yes   Precipitating factors:        Worsened by: bending   Alleviating factors:        Improved by: tylenol   Therapies tried and outcome: tylenol helps if she remembers to take.     Patient Active Problem List   Diagnosis     Essential hypertension with goal blood pressure less than 140/90     Hidradenitis     Localized osteoarthritis of hand     Synovial cyst of popliteal space     DERMATOPHYTOSIS OF NAIL- toenails      Lipidosis     Osteopenia     Postmenopausal     NUMBNESS AND TINGLING OF FOOT - bilateral -mild      Numbness of feet- distal feet R>L      Stress incontinence, female - mild     Cough     Acute bronchitis- recurrent- ? related to mold in school building- pt has since retired and no further bronchitis     Rosacea     History of migraine headaches- assoc. with nausea/vomiting - resolved around menopause - were  premenstrual      Dupuytren's  "disease of palm - right 4th digit flexor tendon     Sun-damaged skin     Seasonal allergic rhinitis     Basal cell carcinoma of leg, right     Adjustment disorder with mixed anxiety and depressed mood- mild-moderate  at this time     Family history of celiac disease     Generalized anxiety disorder     Insomnia, unspecified type     Chronic constipation     Raynaud's phenomenon without gangrene     Other secondary osteoarthritis of left knee     Primary localized osteoarthrosis of left lower leg     Hyperlipidemia LDL goal <130     Basal cell carcinoma     Colon polyps     Screening for cervical cancer     Adjustment insomnia     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        Current Outpatient Medications   Medication Sig Dispense Refill     clindamycin (CLINDAGEL) 1 % external gel Apply topically daily 60 g 11     lactobacillus rhamnosus, GG, (CULTURELL) capsule Take 1 capsule by mouth daily       lisinopril (ZESTRIL) 2.5 MG tablet Take 1 tablet (2.5 mg) by mouth daily 90 tablet 3     ondansetron (ZOFRAN ODT) 4 MG ODT tab Take 1 tablet (4 mg) by mouth every 8 hours as needed for nausea 30 tablet 0     venlafaxine (EFFEXOR XR) 150 MG 24 hr capsule Take 1 capsule (150 mg) by mouth daily 90 capsule 0     zolpidem ER (AMBIEN CR) 6.25 MG CR tablet Take 1 tablet (6.25 mg) by mouth nightly as needed for sleep 90 tablet 0          Allergies   Allergen Reactions     Abilify [Aripiprazole] Other (See Comments)     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      Objective  :   /72   Pulse 110   Temp 98.4  F (36.9  C) (Tympanic)   Resp 16   Ht 1.715 m (5' 7.5\")   Wt 58.2 kg (128 lb 6.4 " oz)   LMP 06/28/2001   SpO2 99%   BMI 19.81 kg/m    There is no height or weight on file to calculate BMI.  Physical Exam   GENERAL: healthy, alert and no distress  EYES: Eyes grossly normal to inspection, PERRL and conjunctivae and sclerae normal  HENT: ear canals and TM's normal, nose and mouth without ulcers or lesions  RESP: lungs clear to auscultation - no rales, rhonchi or wheezes  MS: no gross musculoskeletal defects noted, no edema  SKIN: no suspicious lesions or rashes  NEURO: Normal strength and tone, mentation intact and speech normal  PSYCH: mentation appears normal, affect normal/bright, judgement and insight intact and appearance well groomed    Office Visit on 08/05/2022   Component Date Value Ref Range Status     Case Report 08/05/2022    Final                    Value:Surgical Pathology Report                         Case: [id number]                                  Authorizing Provider:  Ligia Carbajal PA-C    Collected:           08/05/2022 10:44 AM          Ordering Location:     [facility]   Received:            08/05/2022 04:17 PM                                 [facility]                                                           Pathologist:           Rodrigo Taylor MD                                                      Specimens:   A) - Skin, left forearm                                                                             B) - Skin, right lower leg                                                                  Final Diagnosis 08/05/2022    Final                    Value:This result contains rich text formatting which cannot be displayed here.     Clinical Information 08/05/2022    Final                    Value:This result contains rich text formatting which cannot be displayed here.     Gross Description 08/05/2022    Final                    Value:This result contains rich text formatting which cannot be displayed here.     Microscopic  Description 08/05/2022    Final                    Value:This result contains rich text formatting which cannot be displayed here.     Performing Labs 08/05/2022    Final                    Value:This result contains rich text formatting which cannot be displayed here.

## 2022-10-31 ENCOUNTER — VIRTUAL VISIT (OUTPATIENT)
Dept: BEHAVIORAL HEALTH | Facility: CLINIC | Age: 67
End: 2022-10-31
Payer: COMMERCIAL

## 2022-10-31 DIAGNOSIS — F41.1 GENERALIZED ANXIETY DISORDER: Primary | ICD-10-CM

## 2022-10-31 PROCEDURE — 90837 PSYTX W PT 60 MINUTES: CPT | Mod: 95 | Performed by: SOCIAL WORKER

## 2022-10-31 NOTE — PROGRESS NOTES
[facility]  October 31, 2022      Behavioral Health Clinician Progress Note    Patient Name: Jolynn Haji           Service Type:  Individual      Service Location:   MyChart / Email (patient reached)     Session Start Time: 10:26 am  Session End Time: 11:27am      Session Length: 53 - 60      Attendees: Patient     Service Modality:  Video Visit:      Provider verified identity through the following two step process.  Patient provided:  Patient is known previously to provider    Telemedicine Visit: The patient's condition can be safely assessed and treated via synchronous audio and visual telemedicine encounter.      Reason for Telemedicine Visit: Patient has requested telehealth visit    Originating Site (Patient Location): Patient's home    Distant Site (Provider Location): [facility]    Consent:  The patient/guardian has verbally consented to: the potential risks and benefits of telemedicine (video visit) versus in person care; bill my insurance or make self-payment for services provided; and responsibility for payment of non-covered services.     Patient would like the video invitation sent by:  My Chart    Mode of Communication:  Video Conference via [employer]    As the provider I attest to compliance with applicable laws and regulations related to telemedicine.    Visit Activities (Refresh list every visit): [facility] Only    Diagnostic Assessment Date: 9/27/22  Treatment Plan Review Date: 10/5/22  See Flowsheets for today's PHQ-9 and TIFFANY-7 results  Previous PHQ-9:   PHQ-9 SCORE 9/27/2022 9/29/2022 10/28/2022   PHQ-9 Total Score - - -   [facility] 6 (Mild depression) 7 (Mild depression) 5 (Mild depression)   PHQ-9 Total Score 6 7 5   Some encounter information is confidential and restricted. Go to Review Flowsheets activity to see all data.     Previous TIFFANY-7:   TIFFANY-7 SCORE 8/25/2022 9/27/2022 10/28/2022   Total  Score - - -   Total Score 8 (mild anxiety) 9 (mild anxiety) 7 (mild anxiety)   Total Score 8 9 7       KARINA LEVEL:  KARINA Score (Last Two) 8/27/2010 2/22/2011   KARINA Raw Score 51 49   Activation Score 91.6 82.8   KARINA Level 4 4       DATA  Extended Session (60+ minutes): No  Interactive Complexity: No  Crisis: No  [facility] Patient: No    Treatment Objective(s) Addressed in This Session:  Target Behavior(s): anxiety and depression    Depressed Mood: Increase interest, engagement, and pleasure in doing things  Decrease frequency and intensity of feeling down, depressed, hopeless  Improve quantity and quality of night time sleep / decrease daytime naps  Feel less tired and more energy during the day   Improve diet, appetite, mindful eating, and / or meal planning  Identify negative self-talk and behaviors: challenge core beliefs, myths, and actions  Improve concentration, focus, and mindfulness in daily activities   Feel less fidgety, restless or slow in daily activities / interpersonal interactions  Anxiety: will experience a reduction in anxiety, will develop more effective coping skills to manage anxiety symptoms, will develop healthy cognitive patterns and beliefs and will increase ability to function adaptively    Current Stressors / Issues:  Met with pt this morning. She states she is doing well today. Reports going to bed a bit later to possibly avoid such an early waking. Anxiety has been somewhat improved since last visit. Continues to have some physical sx such as chest tightening. Continues to have nervous feeling through the day. Continues to have anxiety when doing things socially. She has been writing a journal with a gratitude list. Exercising daily with walking the dog. Using the Calm lorena for meditation and imagery. She is working on acknowledging the anxiety and attempting to not  it. Unexpected events or plans tend to bring up a lot of anxiety. Discussed anxiety about planning for Thanksgiving. She is not  "feeling up to planning and doing the prep. She is having trouble with frustration with self. Discussed acceptance around the progress of her mental health and listening to family who are offering her a lot of support.     Pt brought up the reoccurring thoughts she has been having where she thinks to herself \"I wish I were dead.\" This thought comes automatically when she is feeling not up for doing something. States this is not a true feeling of hers but the thought continues to enter her mind. She is unnerved by this at times. She denies plan or intention. She is concerned it could be medication related as she noticed it start around the time of starting meds for anxiety. She describes these thoughts as fleeting and easily controlled. [facility] communicated this information with Dr. Pompa, pts psychiatrist. Dr. Pompa will touch base with pt via [employer].       10/14/22  Met with pt this morning who is reporting anxiety has improved a bit since last visit. States she has been active in body scanning, incorporating meditation back into her life and use of imagery. Discussed continued engagement in anxiety provoking situations. Discussed attempting to observe anxiety in a non- mental and objective manner. States people around her have noticed an improvement in her sx however she continues to feel \"like a wreck inside.\" Finally, discussed examining situations in which she is attempting to have control over. Worked on identifying areas in which she has no or little control. Discussed attempts to let go. Does report some issues with sleep on fast release Ambien. Pt has follow up appt with Dr. Pompa in just over a week. She has been taking long release Ambien which allows her to sleep longer throughout the night. Encouraged her to reach out to provider prior to appt if issues worsen.     10/5/22  Reports doing well today. She has been trying to remain social. She tends to have an initial panic stricken feeling when " committing to something with others. Since last med adjustment appears more calmer. Discussed areas in her life which appear to cause most of her anxiety. One area is around situations she has little control over, such as the result of her mental health. Discussed focus on acceptance of current state of mental health and focus on things she does have control over such as active steps she is taking to help her mental health. Discussed deep breathing exercise as a way of calming self with highly anxious. Introduced progressive muscle relaxation. Briefly discussed getting into a practice of meditation again. Finally, discussed finding frequent exercise as a part of anxiety. This is a bit more difficult as she has a bad knee which has been limiting for her. Pt is committed to trying out tech. Prior to next session.        9/27/22 DA  Met with pt for initial assessment. Reports several years ago she had a significant head injury which resulted in her inability to sleep. She reports anxiety followed shortly after. Since April of this year she has been experiencing more depressive sx. Her anxiety is mostly around wanting to feel better from her anxiety and not having positive results. Some of the depressive sx she has been having is poor appetite, lack of interest, struggle with focus, low energy, and has had on very rare occasions fleeting thoughts of wishing she were dead to end the pain of depression. Her anxiety sx often are in the form of nervousness, restlessness, and rumination. She used to have panic attacks each morning, however this has subsided for several months now. She has not attempted therapy before. She would like to gain skills relating to managing anxiety. She currently is working with Dr. Pompa for medications.     Progress on Treatment Objective(s) / Homework:  Minimal progress - PRECONTEMPLATION (Not seeing need for change); Intervened by educating the patient about the effects of current behavior on  health.  Evoked information about reasons to continue behavior, express concern / recommendations, and explored any change talk    Motivational Interviewing    MI Intervention: Expressed Empathy/Understanding, Supported Autonomy, Collaboration, Evocation, Open-ended questions, Change talk (evoked) and Reframe     Change Talk Expressed by the Patient: Desire to change Ability to change Reasons to change Need to change    Provider Response to Change Talk: E - Evoked more info from patient about behavior change, A - Affirmed patient's thoughts, decisions, or attempts at behavior change, R - Reflected patient's change talk and S - Summarized patient's change talk statements      Care Plan review completed: Yes    Medication Review:  No changes to current psychiatric medication(s)    Medication Compliance:  Yes    Changes in Health Issues:   None reported    Chemical Use Review:   Substance Use: Chemical use reviewed, no active concerns identified      Tobacco Use: No current tobacco use.      Assessment: Current Emotional / Mental Status (status of significant symptoms):  Risk status (Self / Other harm or suicidal ideation)  Patient denies a history of suicidal ideation, suicide attempts, self-injurious behavior, homicidal ideation, homicidal behavior and and other safety concerns  Patient denies current fears or concerns for personal safety.  Patient denies current or recent suicidal ideation or behaviors.  Patient denies current or recent homicidal ideation or behaviors.  Patient denies current or recent self injurious behavior or ideation.  Patient denies other safety concerns.  A safety and risk management plan has not been developed at this time, however patient was encouraged to call [facility] should there be a change in any of these risk factors.    Appearance:   Appropriate   Eye Contact:   Good   Psychomotor Behavior: Normal   Attitude:   Cooperative   Orientation:   All  Speech   Rate /  Production: Normal    Volume:  Normal   Mood:    Anxious  Depressed   Affect:    Appropriate   Thought Content:  Clear   Thought Form:  Coherent  Logical   Insight:    Good     Diagnoses:  1. Generalized anxiety disorder        Collateral Reports Completed:  Not Applicable    Plan: (Homework, other):  Patient was given information about behavioral services and encouraged to schedule a follow up appointment with the clinic [facility] in 1 week.  She was also given information about mental health symptoms and treatment options .  CD Recommendations: No indications of CD issues.  Erika Francisco MSW LICSW      ______________________________________________________________________    Integrated Primary Care Behavioral Health Treatment Plan    Patient's Name: Jolynn Haji  YOB: 1955    Date of Creation: 10/5/22  Date Treatment Plan Last Reviewed/Revised: 10/5/22    DSM5 Diagnoses: 300.02 (F41.1) Generalized Anxiety Disorder  Psychosocial / Contextual Factors: Individual Factors high anxiety associated with depressive sx, interfereing with ability to function as she would like.  .  PROMIS (reviewed every 90 days):     Referral / Collaboration:  Was/were discussed and patient will pursue. - individual therapy    Anticipated number of session for this episode of care: 5-8 sessions  Anticipation frequency of session: Weekly  Anticipated Duration of each session: 38-52 minutes  Treatment plan will be reviewed in 90 days or when goals have been changed.       MeasurableTreatment Goal(s) related to diagnosis / functional impairment(s)  Goal 1: Patient will work with provider to manage symptoms    I will know I've met my goal when when less anxious and feeling down.      Objective #A (Patient Action)    Patient will  attend all sessions.  Status: New - Date: 10/5/22     Intervention(s)  Therapist will teach educate patient on treatment options, clarify concerns, work with pt to overcome any resistance to  compliance..    Objective #B  Patient will identify 3 fears / thoughts that contribute to feeling anxious.  Status: New - Date: 10/5/22     Intervention(s)  Therapist will educate patient on treatment options, clarify concerns, work with pt to overcome any resistance to compliance..    Objective #C  Patient will identify 3 initial signs or symptoms of anxiety.  Status: New - Date: 10/5/22     Intervention(s)  Therapist will educate patient on treatment options, clarify concerns, work with pt to overcome any resistance to compliance..        Patient has reviewed and agreed to the above plan.      DINESH Whipple  October 31, 2022

## 2022-11-09 ENCOUNTER — VIRTUAL VISIT (OUTPATIENT)
Dept: BEHAVIORAL HEALTH | Facility: CLINIC | Age: 67
End: 2022-11-09
Payer: COMMERCIAL

## 2022-11-09 DIAGNOSIS — F41.1 GENERALIZED ANXIETY DISORDER: Primary | ICD-10-CM

## 2022-11-09 DIAGNOSIS — F39 MOOD DISORDER (H): ICD-10-CM

## 2022-11-09 DIAGNOSIS — G47.00 INSOMNIA, UNSPECIFIED TYPE: ICD-10-CM

## 2022-11-09 PROCEDURE — 90834 PSYTX W PT 45 MINUTES: CPT | Mod: 95 | Performed by: SOCIAL WORKER

## 2022-11-09 NOTE — PROGRESS NOTES
Surgical Office Location:  [facility]  [address] [place] [id number]

## 2022-11-09 NOTE — PROGRESS NOTES
[facility] Integrated Behavioral Health  November 9, 2022      Behavioral Health Clinician Progress Note    Patient Name: Jolynn Haji           Service Type:  Individual      Service Location:   MyChart / Email (patient reached)     Session Start Time: 10:29 am  Session End Time: 11:13am      Session Length: 38 - 52      Attendees: Patient     Service Modality:  Video Visit:      Provider verified identity through the following two step process.  Patient provided:  Patient is known previously to provider    Telemedicine Visit: The patient's condition can be safely assessed and treated via synchronous audio and visual telemedicine encounter.      Reason for Telemedicine Visit: Patient has requested telehealth visit    Originating Site (Patient Location): Patient's home    Distant Site (Provider Location): [facility]    Consent:  The patient/guardian has verbally consented to: the potential risks and benefits of telemedicine (video visit) versus in person care; bill my insurance or make self-payment for services provided; and responsibility for payment of non-covered services.     Patient would like the video invitation sent by:  My Chart    Mode of Communication:  Video Conference via [employer]    As the provider I attest to compliance with applicable laws and regulations related to telemedicine.    Visit Activities (Refresh list every visit): [facility] Only    Diagnostic Assessment Date: 9/27/22  Treatment Plan Review Date: 10/5/22  See Flowsheets for today's PHQ-9 and TIFFANY-7 results  Previous PHQ-9:   PHQ-9 SCORE 9/27/2022 9/29/2022 10/28/2022   PHQ-9 Total Score - - -   [facility] 6 (Mild depression) 7 (Mild depression) 5 (Mild depression)   PHQ-9 Total Score 6 7 5   Some encounter information is confidential and restricted. Go to Review Flowsheets activity to see all data.     Previous TIFFANY-7:   TIFFANY-7 SCORE 8/25/2022 9/27/2022 10/28/2022   Total  Score - - -   Total Score 8 (mild anxiety) 9 (mild anxiety) 7 (mild anxiety)   Total Score 8 9 7       KARINA LEVEL:  KARINA Score (Last Two) 8/27/2010 2/22/2011   KARINA Raw Score 51 49   Activation Score 91.6 82.8   KARINA Level 4 4       DATA  Extended Session (60+ minutes): No  Interactive Complexity: No  Crisis: No  [facility] Patient: No    Treatment Objective(s) Addressed in This Session:  Target Behavior(s): anxiety and depression    Depressed Mood: Increase interest, engagement, and pleasure in doing things  Decrease frequency and intensity of feeling down, depressed, hopeless  Improve quantity and quality of night time sleep / decrease daytime naps  Feel less tired and more energy during the day   Improve diet, appetite, mindful eating, and / or meal planning  Identify negative self-talk and behaviors: challenge core beliefs, myths, and actions  Improve concentration, focus, and mindfulness in daily activities   Feel less fidgety, restless or slow in daily activities / interpersonal interactions  Anxiety: will experience a reduction in anxiety, will develop more effective coping skills to manage anxiety symptoms, will develop healthy cognitive patterns and beliefs and will increase ability to function adaptively    Current Stressors / Issues:  Met with pt this morning. Report 6-7 hours of sleep which is an improvement from 3-4 hours. Reports the daily anxiety has greatly improved. States her anxiety is about half the amount that it was since last. She has been engaging socially. States she even has begun to attend [employer] again. She has been active in acknowledging the anxiety more objectively. She continues with her exercises. She has not had as much fog in the afternoon. Reinforced the need to continue practicing the tools she finds helpful from therapy. Normalized setbacks. Discussed SI from last session. Reports she has noticed them decreasing. Continues to feel the thoughts are highly unwanted and not matching how she  "feels. Denies plans or intentions.         10/31/22  Met with pt this morning. She states she is doing well today. Reports going to bed a bit later to possibly avoid such an early waking. Anxiety has been somewhat improved since last visit. Continues to have some physical sx such as chest tightening. Continues to have nervous feeling through the day. Continues to have anxiety when doing things socially. She has been writing a journal with a gratitude list. Exercising daily with walking the dog. Using the Calm lorena for meditation and imagery. She is working on acknowledging the anxiety and attempting to not  it. Unexpected events or plans tend to bring up a lot of anxiety. Discussed anxiety about planning for Thanksgiving. She is not feeling up to planning and doing the prep. She is having trouble with frustration with self. Discussed acceptance around the progress of her mental health and listening to family who are offering her a lot of support.     Pt brought up the reoccurring thoughts she has been having where she thinks to herself \"I wish I were dead.\" This thought comes automatically when she is feeling not up for doing something. States this is not a true feeling of hers but the thought continues to enter her mind. She is unnerved by this at times. She denies plan or intention. She is concerned it could be medication related as she noticed it start around the time of starting meds for anxiety. She describes these thoughts as fleeting and easily controlled. [facility] communicated this information with Dr. Pompa, pts psychiatrist. Dr. Pompa will touch base with pt via [email or web address].       10/14/22  Met with pt this morning who is reporting anxiety has improved a bit since last visit. States she has been active in body scanning, incorporating meditation back into her life and use of imagery. Discussed continued engagement in anxiety provoking situations. Discussed attempting to observe anxiety in a non- mental and " "objective manner. States people around her have noticed an improvement in her sx however she continues to feel \"like a wreck inside.\" Finally, discussed examining situations in which she is attempting to have control over. Worked on identifying areas in which she has no or little control. Discussed attempts to let go. Does report some issues with sleep on fast release Ambien. Pt has follow up appt with Dr. Pompa in just over a week. She has been taking long release Ambien which allows her to sleep longer throughout the night. Encouraged her to reach out to provider prior to appt if issues worsen.     10/5/22  Reports doing well today. She has been trying to remain social. She tends to have an initial panic stricken feeling when committing to something with others. Since last med adjustment appears more calmer. Discussed areas in her life which appear to cause most of her anxiety. One area is around situations she has little control over, such as the result of her mental health. Discussed focus on acceptance of current state of mental health and focus on things she does have control over such as active steps she is taking to help her mental health. Discussed deep breathing exercise as a way of calming self with highly anxious. Introduced progressive muscle relaxation. Briefly discussed getting into a practice of meditation again. Finally, discussed finding frequent exercise as a part of anxiety. This is a bit more difficult as she has a bad knee which has been limiting for her. Pt is committed to trying out tech. Prior to next session.        9/27/22 DA  Met with pt for initial assessment. Reports several years ago she had a significant head injury which resulted in her inability to sleep. She reports anxiety followed shortly after. Since April of this year she has been experiencing more depressive sx. Her anxiety is mostly around wanting to feel better from her anxiety and not having positive results. Some of the " depressive sx she has been having is poor appetite, lack of interest, struggle with focus, low energy, and has had on very rare occasions fleeting thoughts of wishing she were dead to end the pain of depression. Her anxiety sx often are in the form of nervousness, restlessness, and rumination. She used to have panic attacks each morning, however this has subsided for several months now. She has not attempted therapy before. She would like to gain skills relating to managing anxiety. She currently is working with Dr. Pompa for medications.     Progress on Treatment Objective(s) / Homework:  Minimal progress - PRECONTEMPLATION (Not seeing need for change); Intervened by educating the patient about the effects of current behavior on health.  Evoked information about reasons to continue behavior, express concern / recommendations, and explored any change talk    Motivational Interviewing    MI Intervention: Expressed Empathy/Understanding, Supported Autonomy, Collaboration, Evocation, Open-ended questions, Change talk (evoked) and Reframe     Change Talk Expressed by the Patient: Desire to change Ability to change Reasons to change Need to change    Provider Response to Change Talk: E - Evoked more info from patient about behavior change, A - Affirmed patient's thoughts, decisions, or attempts at behavior change, R - Reflected patient's change talk and S - Summarized patient's change talk statements      Care Plan review completed: Yes    Medication Review:  No changes to current psychiatric medication(s)    Medication Compliance:  Yes    Changes in Health Issues:   None reported    Chemical Use Review:   Substance Use: Chemical use reviewed, no active concerns identified      Tobacco Use: No current tobacco use.      Assessment: Current Emotional / Mental Status (status of significant symptoms):  Risk status (Self / Other harm or suicidal ideation)  Patient denies a history of suicidal ideation, suicide attempts,  self-injurious behavior, homicidal ideation, homicidal behavior and and other safety concerns  Patient denies current fears or concerns for personal safety.  Patient denies current or recent suicidal ideation or behaviors.  Patient denies current or recent homicidal ideation or behaviors.  Patient denies current or recent self injurious behavior or ideation.  Patient denies other safety concerns.  A safety and risk management plan has not been developed at this time, however patient was encouraged to call [place] [phone] should there be a change in any of these risk factors.    Appearance:   Appropriate   Eye Contact:   Good   Psychomotor Behavior: Normal   Attitude:   Cooperative   Orientation:   All  Speech   Rate / Production: Normal    Volume:  Normal   Mood:    Anxious  Depressed   Affect:    Appropriate   Thought Content:  Clear   Thought Form:  Coherent  Logical   Insight:    Good     Diagnoses:  1. Generalized anxiety disorder    2. Insomnia, unspecified type    3. Mood disorder (H)        Collateral Reports Completed:  Not Applicable    Plan: (Homework, other):  Patient was given information about behavioral services and encouraged to schedule a follow up appointment with the clinic [facility] in 1 week.  She was also given information about mental health symptoms and treatment options .  CD Recommendations: No indications of CD issues.  Erika Francisco [facility]      ______________________________________________________________________    Integrated Primary Care Behavioral Health Treatment Plan    Patient's Name: Jolynn Haji  YOB: 1955    Date of Creation: 10/5/22  Date Treatment Plan Last Reviewed/Revised: 10/5/22    DSM5 Diagnoses: 300.02 (F41.1) Generalized Anxiety Disorder  Psychosocial / Contextual Factors: Individual Factors high anxiety associated with depressive sx, interfereing with ability to function as she would like.  .  PROMIS (reviewed every 90 days):     Referral /  Collaboration:  Was/were discussed and patient will pursue. - individual therapy    Anticipated number of session for this episode of care: 5-8 sessions  Anticipation frequency of session: Weekly  Anticipated Duration of each session: 38-52 minutes  Treatment plan will be reviewed in 90 days or when goals have been changed.       MeasurableTreatment Goal(s) related to diagnosis / functional impairment(s)  Goal 1: Patient will work with provider to manage symptoms    I will know I've met my goal when when less anxious and feeling down.      Objective #A (Patient Action)    Patient will  attend all sessions.  Status: New - Date: 10/5/22     Intervention(s)  Therapist will teach educate patient on treatment options, clarify concerns, work with pt to overcome any resistance to compliance..    Objective #B  Patient will identify 3 fears / thoughts that contribute to feeling anxious.  Status: New - Date: 10/5/22     Intervention(s)  Therapist will educate patient on treatment options, clarify concerns, work with pt to overcome any resistance to compliance..    Objective #C  Patient will identify 3 initial signs or symptoms of anxiety.  Status: New - Date: 10/5/22     Intervention(s)  Therapist will educate patient on treatment options, clarify concerns, work with pt to overcome any resistance to compliance..        Patient has reviewed and agreed to the above plan.      DINESH Whipple  November 9, 2022

## 2022-11-10 ENCOUNTER — OFFICE VISIT (OUTPATIENT)
Dept: DERMATOLOGY | Facility: CLINIC | Age: 67
End: 2022-11-10
Payer: COMMERCIAL

## 2022-11-10 DIAGNOSIS — D04.71 SQUAMOUS CELL CARCINOMA IN SITU (SCCIS) OF SKIN OF RIGHT LOWER LEG: ICD-10-CM

## 2022-11-10 DIAGNOSIS — C44.619 BASAL CELL CARCINOMA (BCC) OF LEFT FOREARM: Primary | ICD-10-CM

## 2022-11-10 PROCEDURE — 88331 PATH CONSLTJ SURG 1 BLK 1SPC: CPT | Mod: 59 | Performed by: DERMATOLOGY

## 2022-11-10 PROCEDURE — 11602 EXC TR-EXT MAL+MARG 1.1-2 CM: CPT | Mod: 59 | Performed by: DERMATOLOGY

## 2022-11-10 PROCEDURE — 17313 MOHS 1 STAGE T/A/L: CPT | Performed by: DERMATOLOGY

## 2022-11-10 NOTE — PROGRESS NOTES
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  Patient has no other skin complaints today.  Remainder of the HPI, Meds, PMH, Allergies, FH, and SH was reviewed in chart.      Past Medical History:   Diagnosis Date     Abnormal glandular Papanicolaou smear of cervix- ASCUS in 2003    ASC H-      Basal cell carcinoma of leg, right 2016    Dr Rogers     Colon polyps 2018    tubular adenoam - due 5 yrs - 2 mm     Concussion without loss of consciousness 2017     Hidradenitis     left  groin     HSIL (high grade squamous intraepithelial lesion) on Pap smear of cervix 2000    Normal paps from  to /     Hypertension goal BP (blood pressure) < 140/90      OA (osteoarthritis) of knee     moderate     Postmenopausal since 2008      Shingles 2010    left      Squamous cell carcinoma of skin, unspecified      Synovial cyst of popliteal space        Past Surgical History:   Procedure Laterality Date     COLONOSCOPY  2018    polyps, tubular - 2 mm - due 5 yrs     ZZC NONSPECIFIC PROCEDURE      Colposcopy        Family History   Problem Relation Age of Onset     Thyroid Disease Mother         Graves disease     Hypertension Mother      Diabetes Mother         type 2     Blood Disease Mother          from leukemia at age 78     Diabetes Daughter         Juvenile Diabetes     Heart Disease Father 62        MV problem with CHF  at 62/ from MI     Hypertension Father      Diabetes Son         Juvenile Diabetes     Cancer Maternal Uncle         type ??     Cancer Maternal Uncle         liver       Social History     Socioeconomic History     Marital status:      Spouse name: Zach     Number of children: 2     Years of education: 16     Highest education level: Not on file   Occupational History      Occupation: teacher first grade -retired at age 60     Comment: [employer]     Employer: [employer]   Tobacco Use     Smoking status: Never     Smokeless tobacco: Never   Substance and Sexual Activity     Alcohol use: Yes     Alcohol/week: 0.0 - 1.0 standard drinks     Comment: 2 per month     Drug use: No     Comment: no herbal meds either     Sexual activity: Yes     Partners: Male     Birth control/protection: Surgical     Comment: -was on DepoProvera since 2000-2/2008 -  had vasectomy   Other Topics Concern      Service No     Blood Transfusions No     Caffeine Concern No     Occupational Exposure No     Hobby Hazards No     Sleep Concern No     Stress Concern No     Weight Concern No     Special Diet No     Back Care No     Exercise Yes     Comment: regular      Bike Helmet No     Seat Belt Yes     Comment: always     Self-Exams Yes     Comment: SBE  encouraged monthly     Parent/sibling w/ CABG, MI or angioplasty before 65F 55M? No   Social History Narrative    [place] - [employer] - going to be 6 yrs old spring 2021---needs walks every day. --Helga Ibarra MD      January 13, 2021      Social Determinants of Health     Financial Resource Strain: Not on file   Food Insecurity: Not on file   Transportation Needs: Not on file   Physical Activity: Not on file   Stress: Not on file   Social Connections: Not on file   Intimate Partner Violence: Not on file   Housing Stability: Not on file       Outpatient Encounter Medications as of 11/10/2022   Medication Sig Dispense Refill     clindamycin (CLINDAGEL) 1 % external gel Apply topically daily 60 g 11     lactobacillus rhamnosus, GG, (CULTURELL) capsule Take 1 capsule by mouth daily       lisinopril (ZESTRIL) 2.5 MG tablet Take 1 tablet (2.5 mg) by mouth daily 90 tablet 3     ondansetron (ZOFRAN ODT) 4 MG ODT tab Take 1 tablet (4 mg) by mouth every 8 hours as needed for nausea 30 tablet 0      venlafaxine (EFFEXOR XR) 150 MG 24 hr capsule Take 1 capsule (150 mg) by mouth daily 90 capsule 0     zolpidem ER (AMBIEN CR) 6.25 MG CR tablet Take 1 tablet (6.25 mg) by mouth nightly as needed for sleep 90 tablet 0     Facility-Administered Encounter Medications as of 11/10/2022   Medication Dose Route Frequency Provider Last Rate Last Admin     lidocaine 1 % 4 mL  4 mL INTRA-ARTICULAR Once Helga Ibarra MD         lidocaine 1 % injection 3 mL  3 mL   Giovany Quiroz MD   3 mL at 07/01/22 1120     lidocaine 1 % injection 4 mL  4 mL   Giovany Quiroz MD   4 mL at 07/01/22 1120     triamcinolone (KENALOG-40) injection 40 mg  40 mg INTRA-ARTICULAR Once Helga Ibarra MD         triamcinolone (KENALOG-40) injection 40 mg  40 mg   Giovany Quiroz MD   40 mg at 07/01/22 1120             O:   NAD, WDWN, Alert & Oriented, Mood & Affect wnl, Vitals stable   Here today alone   LMP 06/28/2001    General appearance normal   Vitals stable   Alert, oriented and in no acute distress     L forearm 4mm pink pearly papule   R shin 6mm scaly papule     Eyes: Conjunctivae/lids:Normal     ENT: Lips, buccal mucosa, tongue: normal    MSK:Normal    Cardiovascular: peripheral edema none    Pulm: Breathing Normal    Neuro/Psych: Orientation:Alert and Orientedx3 ; Mood/Affect:normal       MICRO:   L forearm (blue red):Unremarkable epidermis, dermis and superficial subcutis.  No concerning areas for malignancy.     A/P:  1. L forearm basal cell carcinoma   EXCISION OF BASAL CELL CARCINOMA, Margins confirmed with FROZEN SECTIONS AND Second intent: After thorough discussion of PGACAC, consent obtained, anesthesia and prep, the margins of the lesion were identified and an incision was made encompassing the lesion with 4mm margin. The incisions were made through the skin and down to and including the superficial subcutis.  The lesion was removed en bloc and submitted for frozen section pathologic review. Clear margins  obtained (1.2cm).    REPAIR SECOND INTENT: We discussed the options for wound management in full with the patient including risks/benefits/possible outcomes. Decision made to allow the wound to heal by second intention. EBL minimal; complications none; wound care routine.  The patient was discharged in good condition and will return in one month or prn for wound evaluation.   2. R espinoza squamous cell carcinoma in situ   MOHS:   Location    The rationale for Mohs surgery was discussed with the patient and consent was obtained.  The risks and benefits as well as alternatives to therapy were discussed, in detail.  Specifically, the risks of infection, scarring, bleeding, prolonged wound healing, incomplete removal, allergy to anesthesia, nerve injury and recurrence were addressed.  Indication for Mohs was Location. Prior to the procedure, the treatment site was clearly identified and, if available, confirmed with previous photos and confirmed by the patient   All components of the Universal Protocol/PAUSE rule were completed.  The Mohs surgeon operated in two distinct and integrated capacities as the surgeon and pathologist.      The area was prepped with Betasept.  A rim of normal appearing skin was marked circumferentially around the lesion.  The area was infiltrated with local anesthesia.  The tumor was first debulked to remove all clinically apparent tumor.  An incision following the standard Mohs approach was done and the specimen was oriented,mapped and placed in 1 block(s).  Each specimen was then chromacoded and processed in the Mohs laboratory using standard Mohs technique and submitted for frozen section histology.  Frozen section analysis showed no residual tumor but CLEAR MARGINS.      The tumor was excised using standard Mohs technique in 1 stages(s).  CLEAR MARGINS OBTAINED and Final defect size was 1.1 cm.     We discussed the options for wound management in full with the patient including risks/benefits/  possible outcomes.        REPAIR SECOND INTENT: We discussed the options for wound management in full with the patient including risks/benefits/possible outcomes. Decision made to allow the wound to heal by second intention. Cautery was used for for hemostasis. EBL minimal; complications none; wound care routine.  The patient was discharged in good condition and will return in one month or prn for wound evaluation.      1. Seborrheic keratosis, lentigo, angioma, dermal nevus  It was a pleasure speaking to Jolynn Haji today.  Previous clinic notes and pertinent laboratory tests were reviewed prior to Jolynn Haji's visit.  Patient encouraged to perform monthly skin exams.  UV precautions reviewed with patient.  Risks of non-melanoma skin cancer discussed with patient   Return to clinic 6 months

## 2022-11-10 NOTE — LETTER
11/10/2022         RE: Jolynn Haji  [address] [place] [address]        Dear Colleague,    Thank you for referring your patient, Jolynn Haji, to the [facility]. Please see a copy of my visit note below.    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  Patient has no other skin complaints today.  Remainder of the HPI, Meds, PMH, Allergies, FH, and SH was reviewed in chart.      Past Medical History:   Diagnosis Date     Abnormal glandular Papanicolaou smear of cervix- ASCUS in 2003    [facility]-      Basal cell carcinoma of leg, right 2016    Dr Rogers     Colon polyps 2018    tubular adenoam - due 5 yrs - 2 mm     Concussion without loss of consciousness 2017     Hidradenitis     left  groin     HSIL (high grade squamous intraepithelial lesion) on Pap smear of cervix 2000    Normal paps from  to /     Hypertension goal BP (blood pressure) < 140/90      OA (osteoarthritis) of knee     moderate     Postmenopausal since 2008      Shingles 2010    left      Squamous cell carcinoma of skin, unspecified      Synovial cyst of popliteal space        Past Surgical History:   Procedure Laterality Date     COLONOSCOPY  2018    polyps, tubular - 2 mm - due 5 yrs     ZZC NONSPECIFIC PROCEDURE      Colposcopy        Family History   Problem Relation Age of Onset     Thyroid Disease Mother         Graves disease     Hypertension Mother      Diabetes Mother         type 2     Blood Disease Mother          from leukemia at age 78     Diabetes Daughter         Juvenile Diabetes     Heart Disease Father 62        MV problem with CHF  at 62/ from MI      Hypertension Father      Diabetes Son         Juvenile Diabetes     Cancer Maternal Uncle         type ??     Cancer Maternal Uncle         liver       Social History     Socioeconomic History     Marital status:      Spouse name: Zach     Number of children: 2     Years of education: 16     Highest education level: Not on file   Occupational History     Occupation: teacher first grade -retired at age 60     Comment: [id number]     Employer: [employer]   Tobacco Use     Smoking status: Never     Smokeless tobacco: Never   Substance and Sexual Activity     Alcohol use: Yes     Alcohol/week: 0.0 - 1.0 standard drinks     Comment: 2 per month     Drug use: No     Comment: no herbal meds either     Sexual activity: Yes     Partners: Male     Birth control/protection: Surgical     Comment: -was on DepoProvera since 2000-2/2008 -  had vasectomy   Other Topics Concern      Service No     Blood Transfusions No     Caffeine Concern No     Occupational Exposure No     Hobby Hazards No     Sleep Concern No     Stress Concern No     Weight Concern No     Special Diet No     Back Care No     Exercise Yes     Comment: regular      Bike Helmet No     Seat Belt Yes     Comment: always     Self-Exams Yes     Comment: SBE  encouraged monthly     Parent/sibling w/ CABG, MI or angioplasty before 65F 55M? No   Social History Narrative    [place] - [place] C[employer] Spaniel - going to be 6 yrs old spring 2021---needs walks every day. --Helga Ibarra MD      January 13, 2021      Social Determinants of Health     Financial Resource Strain: Not on file   Food Insecurity: Not on file   Transportation Needs: Not on file   Physical Activity: Not on file   Stress: Not on file   Social Connections: Not on file   Intimate Partner Violence: Not on file   Housing Stability: Not on file       Outpatient Encounter Medications as of 11/10/2022   Medication Sig Dispense Refill      clindamycin (CLINDAGEL) 1 % external gel Apply topically daily 60 g 11     lactobacillus rhamnosus, GG, (CULTURELL) capsule Take 1 capsule by mouth daily       lisinopril (ZESTRIL) 2.5 MG tablet Take 1 tablet (2.5 mg) by mouth daily 90 tablet 3     ondansetron (ZOFRAN ODT) 4 MG ODT tab Take 1 tablet (4 mg) by mouth every 8 hours as needed for nausea 30 tablet 0     venlafaxine (EFFEXOR XR) 150 MG 24 hr capsule Take 1 capsule (150 mg) by mouth daily 90 capsule 0     zolpidem ER (AMBIEN CR) 6.25 MG CR tablet Take 1 tablet (6.25 mg) by mouth nightly as needed for sleep 90 tablet 0     Facility-Administered Encounter Medications as of 11/10/2022   Medication Dose Route Frequency Provider Last Rate Last Admin     lidocaine 1 % 4 mL  4 mL INTRA-ARTICULAR Once Helga Ibarra MD         lidocaine 1 % injection 3 mL  3 mL   Giovany Quiroz MD   3 mL at 07/01/22 1120     lidocaine 1 % injection 4 mL  4 mL   Giovany Quiroz MD   4 mL at 07/01/22 1120     triamcinolone (KENALOG-40) injection 40 mg  40 mg INTRA-ARTICULAR Once Helga Ibarra MD         triamcinolone (KENALOG-40) injection 40 mg  40 mg   Giovany Quiroz MD   40 mg at 07/01/22 1120             O:   NAD, WDWN, Alert & Oriented, Mood & Affect wnl, Vitals stable   Here today alone   LMP 06/28/2001    General appearance normal   Vitals stable   Alert, oriented and in no acute distress     L forearm 4mm pink pearly papule   R shin 6mm scaly papule     Eyes: Conjunctivae/lids:Normal     ENT: Lips, buccal mucosa, tongue: normal    MSK:Normal    Cardiovascular: peripheral edema none    Pulm: Breathing Normal    Neuro/Psych: Orientation:Alert and Orientedx3 ; Mood/Affect:normal       MICRO:   L forearm (blue red):Unremarkable epidermis, dermis and superficial subcutis.  No concerning areas for malignancy.     A/P:  1. L forearm basal cell carcinoma   EXCISION OF BASAL CELL CARCINOMA, Margins confirmed with FROZEN SECTIONS AND Second intent: After  thorough discussion of PGACAC, consent obtained, anesthesia and prep, the margins of the lesion were identified and an incision was made encompassing the lesion with 4mm margin. The incisions were made through the skin and down to and including the superficial subcutis.  The lesion was removed en bloc and submitted for frozen section pathologic review. Clear margins obtained (1.2cm).    REPAIR SECOND INTENT: We discussed the options for wound management in full with the patient including risks/benefits/possible outcomes. Decision made to allow the wound to heal by second intention. EBL minimal; complications none; wound care routine.  The patient was discharged in good condition and will return in one month or prn for wound evaluation.   2. R espinoza squamous cell carcinoma in situ   MOHS:   Location    The rationale for Mohs surgery was discussed with the patient and consent was obtained.  The risks and benefits as well as alternatives to therapy were discussed, in detail.  Specifically, the risks of infection, scarring, bleeding, prolonged wound healing, incomplete removal, allergy to anesthesia, nerve injury and recurrence were addressed.  Indication for Mohs was Location. Prior to the procedure, the treatment site was clearly identified and, if available, confirmed with previous photos and confirmed by the patient   All components of the Universal Protocol/PAUSE rule were completed.  The Mohs surgeon operated in two distinct and integrated capacities as the surgeon and pathologist.      The area was prepped with Betasept.  A rim of normal appearing skin was marked circumferentially around the lesion.  The area was infiltrated with local anesthesia.  The tumor was first debulked to remove all clinically apparent tumor.  An incision following the standard Mohs approach was done and the specimen was oriented,mapped and placed in 1 block(s).  Each specimen was then chromacoded and processed in the Mohs laboratory using  standard Mohs technique and submitted for frozen section histology.  Frozen section analysis showed no residual tumor but CLEAR MARGINS.      The tumor was excised using standard Mohs technique in 1 stages(s).  CLEAR MARGINS OBTAINED and Final defect size was 1.1 cm.     We discussed the options for wound management in full with the patient including risks/benefits/ possible outcomes.        REPAIR SECOND INTENT: We discussed the options for wound management in full with the patient including risks/benefits/possible outcomes. Decision made to allow the wound to heal by second intention. Cautery was used for for hemostasis. EBL minimal; complications none; wound care routine.  The patient was discharged in good condition and will return in one month or prn for wound evaluation.      1. Seborrheic keratosis, lentigo, angioma, dermal nevus  It was a pleasure speaking to Jolynn Haji today.  Previous clinic notes and pertinent laboratory tests were reviewed prior to Jolynn Haji's visit.  Patient encouraged to perform monthly skin exams.  UV precautions reviewed with patient.  Risks of non-melanoma skin cancer discussed with patient   Return to clinic 6 months        Again, thank you for allowing me to participate in the care of your patient.        Sincerely,        Sheldon Rogers MD

## 2022-11-10 NOTE — PATIENT INSTRUCTIONS
Open Wound Care     for ______________        No strenuous activity for 48 hours    Take Tylenol as needed for discomfort.                                                .         Do not drink alcoholic beverages for 48 hours.    Keep the pressure bandage in place for 24 hours. If the bandage becomes blood tinged or loose, reinforce it with gauze and tape.        (Refer to the reverse side of this page for management of bleeding).    Remove bandage in 24 hours and begin wound care as follows:     Clean area with tap water using a Q tip or gauze pad, (shower / bathe normally)  Dry wound with Q tip or gauze pad  Apply Aquaphor, Vaseline, Polysporin or Bacitracin Ointment with a Q tip  Do NOT use Neosporin Ointment *  Cover the wound with a band-aid or nonstick gauze pad and paper tape.  Repeat wound care once a day until wound is completely healed.    It is an old wives tale that a wound heals better when it is exposed to air and allowed to dry out. The wound will heal faster with a better cosmetic result if it is kept moist with ointment and covered with a bandage.  Do not let the wound dry out.      Supplies Needed:                Qtips or gauze pads                Polysporin or Bacitracin Ointment                Bandaids or nonstick gauze pads and paper tape    Wound care kits and brown paper tape are available for purchase at   the pharmacy.    BLEEDING:    Use tightly rolled up gauze or cloth to apply direct pressure over the bandage for 20   minutes.  Reapply pressure for an additional 20 minutes if necessary  Call the office or go to the nearest emergency room if pressure fails to stop the bleeding.  Use additional gauze and tape to maintain pressure once the bleeding has stopped.  Begin wound care 24 hours after surgery as directed.                  WOUND HEALING    One week after surgery a pink / red halo will form around the outside of the wound.   This is new skin.  The center of the wound will appear  yellowish white and produce some drainage.  The pink halo will slowly migrate in toward the center of the wound until the wound is covered with new shiny pink skin.  There will be no more drainage when the wound is completely healed.  It will take six months to one year for the redness to fade.  The scar may be itchy, tight and sensitive to extreme temperatures for a year after the surgery.  Massaging the area several times a day for several minutes after the wound is completely healed will help the scar soften and normalize faster. Begin massage only after healing is complete.      In case of emergency call: Dr Rogers: [phone]    [facility]: [phone]    [facility]:[phone]

## 2022-11-11 ENCOUNTER — PRE VISIT (OUTPATIENT)
Dept: NEUROLOGY | Facility: CLINIC | Age: 67
End: 2022-11-11

## 2022-11-14 NOTE — PROGRESS NOTES
[facility] Behavioral [facility]  November 15, 2022      Behavioral Health Clinician Progress Note    Patient Name: Jolynn Haji           Service Type:  Individual      Service Location:   MyChart / Email (patient reached)     Session Start Time: 12:30pm  Session End Time: 1:27pm      Session Length: 53 - 60      Attendees: Patient     Service Modality:  Video Visit:      Provider verified identity through the following two step process.  Patient provided:  Patient is known previously to provider    Telemedicine Visit: The patient's condition can be safely assessed and treated via synchronous audio and visual telemedicine encounter.      Reason for Telemedicine Visit: Patient has requested telehealth visit    Originating Site (Patient Location): Patient's home    Distant Site (Provider Location): [facility]    Consent:  The patient/guardian has verbally consented to: the potential risks and benefits of telemedicine (video visit) versus in person care; bill my insurance or make self-payment for services provided; and responsibility for payment of non-covered services.     Patient would like the video invitation sent by:  My Chart    Mode of Communication:  Video Conference via [employer]    As the provider I attest to compliance with applicable laws and regulations related to telemedicine.    Visit Activities (Refresh list every visit): [facility] Only    Diagnostic Assessment Date: 9/27/22  Treatment Plan Review Date: 10/5/22  See Flowsheets for today's PHQ-9 and TIFFANY-7 results  Previous PHQ-9:   PHQ-9 SCORE 9/27/2022 9/29/2022 10/28/2022   PHQ-9 Total Score - - -   PHQ-9 [facility] Score [facility] 6 (Mild depression) 7 (Mild depression) 5 (Mild depression)   PHQ-9 Total Score 6 7 5   Some encounter information is confidential and restricted. Go to Review Flowsheets activity to see all data.     Previous TIFFANY-7:   TIFFANY-7 SCORE 8/25/2022 9/27/2022 10/28/2022   Total  Score - - -   Total Score 8 (mild anxiety) 9 (mild anxiety) 7 (mild anxiety)   Total Score 8 9 7       KARINA LEVEL:  KARINA Score (Last Two) 8/27/2010 2/22/2011   KARINA Raw Score 51 49   Activation Score 91.6 82.8   KARINA Level 4 4       DATA  Extended Session (60+ minutes): No  Interactive Complexity: No  Crisis: No  [facility] Patient: No    Treatment Objective(s) Addressed in This Session:  Target Behavior(s): anxiety and depression    Depressed Mood: Increase interest, engagement, and pleasure in doing things  Decrease frequency and intensity of feeling down, depressed, hopeless  Improve quantity and quality of night time sleep / decrease daytime naps  Feel less tired and more energy during the day   Improve diet, appetite, mindful eating, and / or meal planning  Identify negative self-talk and behaviors: challenge core beliefs, myths, and actions  Improve concentration, focus, and mindfulness in daily activities   Feel less fidgety, restless or slow in daily activities / interpersonal interactions  Anxiety: will experience a reduction in anxiety, will develop more effective coping skills to manage anxiety symptoms, will develop healthy cognitive patterns and beliefs and will increase ability to function adaptively    Current Stressors / Issues:  Met with pt today for follow-up. She reports doing really well. She went out to dinner with family. She really enjoyed herself at dinner which is a difference from several months ago. She is really enjoying spending time with her grand children. On Sunday she drove on her own from the first time in a very long time. [facility] validated and praised pts efforts. Reports still is having some general anxiety through out the day. She is looking forward to a sleep over with her julio. Discussed finding ways to access the comforts of meditation on the go. Discussed the up coming holidays and the work she is doing to avoid negative impact on her mental health.      11/9/22  Met with pt this  "morning. Report 6-7 hours of sleep which is an improvement from 3-4 hours. Reports the daily anxiety has greatly improved. States her anxiety is about half the amount that it was since last. She has been engaging socially. States she even has begun to attend Christianity again. She has been active in acknowledging the anxiety more objectively. She continues with her exercises. She has not had as much fog in the afternoon. Reinforced the need to continue practicing the tools she finds helpful from therapy. Normalized setbacks. Discussed SI from last session. Reports she has noticed them decreasing. Continues to feel the thoughts are highly unwanted and not matching how she feels. Denies plans or intentions.         10/31/22  Met with pt this morning. She states she is doing well today. Reports going to bed a bit later to possibly avoid such an early waking. Anxiety has been somewhat improved since last visit. Continues to have some physical sx such as chest tightening. Continues to have nervous feeling through the day. Continues to have anxiety when doing things socially. She has been writing a journal with a gratitude list. Exercising daily with walking the dog. Using the Calm lorena for meditation and imagery. She is working on acknowledging the anxiety and attempting to not  it. Unexpected events or plans tend to bring up a lot of anxiety. Discussed anxiety about planning for Thanksgiving. She is not feeling up to planning and doing the prep. She is having trouble with frustration with self. Discussed acceptance around the progress of her mental health and listening to family who are offering her a lot of support.     Pt brought up the reoccurring thoughts she has been having where she thinks to herself \"I wish I were dead.\" This thought comes automatically when she is feeling not up for doing something. States this is not a true feeling of hers but the thought continues to enter her mind. She is unnerved by this at " "times. She denies plan or intention. She is concerned it could be medication related as she noticed it start around the time of starting meds for anxiety. She describes these thoughts as fleeting and easily controlled. [facility] communicated this information with Dr. Pompa, pts psychiatrist. Dr. Pompa will touch base with pt via [employer].       10/14/22  Met with pt this morning who is reporting anxiety has improved a bit since last visit. States she has been active in body scanning, incorporating meditation back into her life and use of imagery. Discussed continued engagement in anxiety provoking situations. Discussed attempting to observe anxiety in a non- mental and objective manner. States people around her have noticed an improvement in her sx however she continues to feel \"like a wreck inside.\" Finally, discussed examining situations in which she is attempting to have control over. Worked on identifying areas in which she has no or little control. Discussed attempts to let go. Does report some issues with sleep on fast release Ambien. Pt has follow up appt with Dr. Pompa in just over a week. She has been taking long release Ambien which allows her to sleep longer throughout the night. Encouraged her to reach out to provider prior to appt if issues worsen.     10/5/22  Reports doing well today. She has been trying to remain social. She tends to have an initial panic stricken feeling when committing to something with others. Since last med adjustment appears more calmer. Discussed areas in her life which appear to cause most of her anxiety. One area is around situations she has little control over, such as the result of her mental health. Discussed focus on acceptance of current state of mental health and focus on things she does have control over such as active steps she is taking to help her mental health. Discussed deep breathing exercise as a way of calming self with highly anxious. Introduced progressive " muscle relaxation. Briefly discussed getting into a practice of meditation again. Finally, discussed finding frequent exercise as a part of anxiety. This is a bit more difficult as she has a bad knee which has been limiting for her. Pt is committed to trying out tech. Prior to next session.        9/27/22 DA  Met with pt for initial assessment. Reports several years ago she had a significant head injury which resulted in her inability to sleep. She reports anxiety followed shortly after. Since April of this year she has been experiencing more depressive sx. Her anxiety is mostly around wanting to feel better from her anxiety and not having positive results. Some of the depressive sx she has been having is poor appetite, lack of interest, struggle with focus, low energy, and has had on very rare occasions fleeting thoughts of wishing she were dead to end the pain of depression. Her anxiety sx often are in the form of nervousness, restlessness, and rumination. She used to have panic attacks each morning, however this has subsided for several months now. She has not attempted therapy before. She would like to gain skills relating to managing anxiety. She currently is working with Dr. Pompa for medications.     Progress on Treatment Objective(s) / Homework:  Minimal progress - PRECONTEMPLATION (Not seeing need for change); Intervened by educating the patient about the effects of current behavior on health.  Evoked information about reasons to continue behavior, express concern / recommendations, and explored any change talk    Motivational Interviewing    MI Intervention: Expressed Empathy/Understanding, Supported Autonomy, Collaboration, Evocation, Open-ended questions, Change talk (evoked) and Reframe     Change Talk Expressed by the Patient: Desire to change Ability to change Reasons to change Need to change    Provider Response to Change Talk: E - Evoked more info from patient about behavior change, A - Affirmed  patient's thoughts, decisions, or attempts at behavior change, R - Reflected patient's change talk and S - Summarized patient's change talk statements      Care Plan review completed: Yes    Medication Review:  No changes to current psychiatric medication(s)    Medication Compliance:  Yes    Changes in Health Issues:   None reported    Chemical Use Review:   Substance Use: Chemical use reviewed, no active concerns identified      Tobacco Use: No current tobacco use.      Assessment: Current Emotional / Mental Status (status of significant symptoms):  Risk status (Self / Other harm or suicidal ideation)  Patient denies a history of suicidal ideation, suicide attempts, self-injurious behavior, homicidal ideation, homicidal behavior and and other safety concerns  Patient denies current fears or concerns for personal safety.  Patient denies current or recent suicidal ideation or behaviors.  Patient denies current or recent homicidal ideation or behaviors.  Patient denies current or recent self injurious behavior or ideation.  Patient denies other safety concerns.  A safety and risk management plan has not been developed at this time, however patient was encouraged to call [place] [phone] should there be a change in any of these risk factors.    Appearance:   Appropriate   Eye Contact:   Good   Psychomotor Behavior: Normal   Attitude:   Cooperative   Orientation:   All  Speech   Rate / Production: Normal    Volume:  Normal   Mood:    Anxious  Depressed   Affect:    Appropriate   Thought Content:  Clear   Thought Form:  Coherent  Logical   Insight:    Good     Diagnoses:  1. Generalized anxiety disorder    2. Insomnia, unspecified type    3. Mood disorder (H)        Collateral Reports Completed:  Not Applicable    Plan: (Homework, other):  Patient was given information about behavioral services and encouraged to schedule a follow up appointment with the clinic [facility] in 1 week.  She was also given information about  mental health symptoms and treatment options .  CD Recommendations: No indications of CD issues.  Erika Francisco MSW LICSW      ______________________________________________________________________    Integrated Primary Care Behavioral Health Treatment Plan    Patient's Name: Jolynn Haji  YOB: 1955    Date of Creation: 10/5/22  Date Treatment Plan Last Reviewed/Revised: 10/5/22    DSM5 Diagnoses: 300.02 (F41.1) Generalized Anxiety Disorder  Psychosocial / Contextual Factors: Individual Factors high anxiety associated with depressive sx, interfereing with ability to function as she would like.  .  PROMIS (reviewed every 90 days):     Referral / Collaboration:  Was/were discussed and patient will pursue. - individual therapy    Anticipated number of session for this episode of care: 5-8 sessions  Anticipation frequency of session: Weekly  Anticipated Duration of each session: 38-52 minutes  Treatment plan will be reviewed in 90 days or when goals have been changed.       MeasurableTreatment Goal(s) related to diagnosis / functional impairment(s)  Goal 1: Patient will work with provider to manage symptoms    I will know I've met my goal when when less anxious and feeling down.      Objective #A (Patient Action)    Patient will  attend all sessions.  Status: New - Date: 10/5/22     Intervention(s)  Therapist will teach educate patient on treatment options, clarify concerns, work with pt to overcome any resistance to compliance..    Objective #B  Patient will identify 3 fears / thoughts that contribute to feeling anxious.  Status: New - Date: 10/5/22     Intervention(s)  Therapist will educate patient on treatment options, clarify concerns, work with pt to overcome any resistance to compliance..    Objective #C  Patient will identify 3 initial signs or symptoms of anxiety.  Status: New - Date: 10/5/22     Intervention(s)  Therapist will educate patient on treatment options, clarify concerns, work with pt to  overcome any resistance to compliance..        Patient has reviewed and agreed to the above plan.      DINESH Whipple  November 15, 2022

## 2022-11-15 ENCOUNTER — VIRTUAL VISIT (OUTPATIENT)
Dept: BEHAVIORAL HEALTH | Facility: CLINIC | Age: 67
End: 2022-11-15
Payer: COMMERCIAL

## 2022-11-15 DIAGNOSIS — F39 MOOD DISORDER (H): ICD-10-CM

## 2022-11-15 DIAGNOSIS — G47.00 INSOMNIA, UNSPECIFIED TYPE: ICD-10-CM

## 2022-11-15 DIAGNOSIS — F41.1 GENERALIZED ANXIETY DISORDER: Primary | ICD-10-CM

## 2022-11-15 PROCEDURE — 90837 PSYTX W PT 60 MINUTES: CPT | Mod: 95 | Performed by: SOCIAL WORKER

## 2022-11-16 ENCOUNTER — HOSPITAL ENCOUNTER (OUTPATIENT)
Dept: MAMMOGRAPHY | Facility: CLINIC | Age: 67
Discharge: HOME | End: 2022-11-16
Attending: FAMILY MEDICINE | Admitting: FAMILY MEDICINE
Payer: COMMERCIAL

## 2022-11-16 DIAGNOSIS — Z12.31 VISIT FOR SCREENING MAMMOGRAM: ICD-10-CM

## 2022-11-16 PROCEDURE — 77067 SCR MAMMO BI INCL CAD: CPT

## 2022-11-18 NOTE — RESULT ENCOUNTER NOTE
Your mammogram was normal.     Thank you so much for choosing [facility].  Please contact us with any questions that you may have.   We appreciate the opportunity to serve you now and look forward to supporting your healthcare needs for a long time to come!    Most Sincerely,     Helga Ibarra MD

## 2022-11-28 NOTE — PROGRESS NOTES
[facility] Integrated Behavioral Health  November 29, 2022      Behavioral Health Clinician Progress Note    Patient Name: Jolynn Haji           Service Type:  Individual      Service Location:   MyChart / Email (patient reached)     Session Start Time: 10:00am  Session End Time: 10:53am      Session Length: 53 - 60      Attendees: Patient     Service Modality:  Video Visit:      Provider verified identity through the following two step process.  Patient provided:  Patient is known previously to provider    Telemedicine Visit: The patient's condition can be safely assessed and treated via synchronous audio and visual telemedicine encounter.      Reason for Telemedicine Visit: Patient has requested telehealth visit    Originating Site (Patient Location): Patient's home    Distant Site (Provider Location): [facility]    Consent:  The patient/guardian has verbally consented to: the potential risks and benefits of telemedicine (video visit) versus in person care; bill my insurance or make self-payment for services provided; and responsibility for payment of non-covered services.     Patient would like the video invitation sent by:  My Chart    Mode of Communication:  Video Conference via [employer]    As the provider I attest to compliance with applicable laws and regulations related to telemedicine.    Visit Activities (Refresh list every visit): [facility] Only    Diagnostic Assessment Date: 9/27/22  Treatment Plan Review Date: 10/5/22  See Flowsheets for today's PHQ-9 and TIFFANY-7 results  Previous PHQ-9:   PHQ-9 SCORE 9/27/2022 9/29/2022 10/28/2022   PHQ-9 Total Score - - -   PHQ-9 Total Score [facility] 6 (Mild depression) 7 (Mild depression) 5 (Mild depression)   PHQ-9 Total Score 6 7 5   Some encounter information is confidential and restricted. Go to Review Flowsheets activity to see all data.     Previous TIFFANY-7:   TIFFANY-7 SCORE 8/25/2022 9/27/2022 10/28/2022   Total  Score - - -   Total Score 8 (mild anxiety) 9 (mild anxiety) 7 (mild anxiety)   Total Score 8 9 7       KARINA LEVEL:  KARINA Score (Last Two) 8/27/2010 2/22/2011   KARINA Raw Score 51 49   Activation Score 91.6 82.8   KARINA Level 4 4       DATA  Extended Session (60+ minutes): No  Interactive Complexity: No  Crisis: No  [facility] Patient: No    Treatment Objective(s) Addressed in This Session:  Target Behavior(s): anxiety and depression    Depressed Mood: Increase interest, engagement, and pleasure in doing things  Decrease frequency and intensity of feeling down, depressed, hopeless  Improve quantity and quality of night time sleep / decrease daytime naps  Feel less tired and more energy during the day   Improve diet, appetite, mindful eating, and / or meal planning  Identify negative self-talk and behaviors: challenge core beliefs, myths, and actions  Improve concentration, focus, and mindfulness in daily activities   Feel less fidgety, restless or slow in daily activities / interpersonal interactions  Anxiety: will experience a reduction in anxiety, will develop more effective coping skills to manage anxiety symptoms, will develop healthy cognitive patterns and beliefs and will increase ability to function adaptively    Current Stressors / Issues:  Met with pt this morning. Reports some anxiety with logging on to the appt. Reports Thanksgiving went really well despite making changes to the normal traditions. States that she also had family over for her husbands birthday and cooked dinner for everyone. She has not done this in many months and she stated it felt really good to do. Reports some anxiety around [place] shopping. As an alternative she has been ordering gifts online this year. Sleep continues to improve. States she sleep 7 hours last night. She continues to attend to self talk. Discussed check ins with self to assess level of anxiety.       11/15/22  Met with pt today for follow-up. She reports doing really well. She  went out to dinner with family. She really enjoyed herself at dinner which is a difference from several months ago. She is really enjoying spending time with her grand children. On Sunday she drove on her own from the first time in a very long time. [facility] validated and praised pts efforts. Reports still is having some general anxiety through out the day. She is looking forward to a sleep over with her julio. Discussed finding ways to access the comforts of meditation on the go. Discussed the up coming holidays and the work she is doing to avoid negative impact on her mental health. Reports having headaches consistently and an afternoon slump/tiredness.     11/9/22  Met with pt this morning. Report 6-7 hours of sleep which is an improvement from 3-4 hours. Reports the daily anxiety has greatly improved. States her anxiety is about half the amount that it was since last. She has been engaging socially. States she even has begun to attend [employer] again. She has been active in acknowledging the anxiety more objectively. She continues with her exercises. She has not had as much fog in the afternoon. Reinforced the need to continue practicing the tools she finds helpful from therapy. Normalized setbacks. Discussed SI from last session. Reports she has noticed them decreasing. Continues to feel the thoughts are highly unwanted and not matching how she feels. Denies plans or intentions.         10/31/22  Met with pt this morning. She states she is doing well today. Reports going to bed a bit later to possibly avoid such an early waking. Anxiety has been somewhat improved since last visit. Continues to have some physical sx such as chest tightening. Continues to have nervous feeling through the day. Continues to have anxiety when doing things socially. She has been writing a journal with a gratitude list. Exercising daily with walking the dog. Using the Calm lorena for meditation and imagery. She is working on acknowledging  "the anxiety and attempting to not  it. Unexpected events or plans tend to bring up a lot of anxiety. Discussed anxiety about planning for Thanksgiving. She is not feeling up to planning and doing the prep. She is having trouble with frustration with self. Discussed acceptance around the progress of her mental health and listening to family who are offering her a lot of support.     Pt brought up the reoccurring thoughts she has been having where she thinks to herself \"I wish I were dead.\" This thought comes automatically when she is feeling not up for doing something. States this is not a true feeling of hers but the thought continues to enter her mind. She is unnerved by this at times. She denies plan or intention. She is concerned it could be medication related as she noticed it start around the time of starting meds for anxiety. She describes these thoughts as fleeting and easily controlled. [facility] communicated this information with Dr. Pompa, pts psychiatrist. Dr. Pompa will touch base with pt via [employer].       10/14/22  Met with pt this morning who is reporting anxiety has improved a bit since last visit. States she has been active in body scanning, incorporating meditation back into her life and use of imagery. Discussed continued engagement in anxiety provoking situations. Discussed attempting to observe anxiety in a non- mental and objective manner. States people around her have noticed an improvement in her sx however she continues to feel \"like a wreck inside.\" Finally, discussed examining situations in which she is attempting to have control over. Worked on identifying areas in which she has no or little control. Discussed attempts to let go. Does report some issues with sleep on fast release Ambien. Pt has follow up appt with Dr. Pompa in just over a week. She has been taking long release Ambien which allows her to sleep longer throughout the night. Encouraged her to reach out to provider prior " to appt if issues worsen.     10/5/22  Reports doing well today. She has been trying to remain social. She tends to have an initial panic stricken feeling when committing to something with others. Since last med adjustment appears more calmer. Discussed areas in her life which appear to cause most of her anxiety. One area is around situations she has little control over, such as the result of her mental health. Discussed focus on acceptance of current state of mental health and focus on things she does have control over such as active steps she is taking to help her mental health. Discussed deep breathing exercise as a way of calming self with highly anxious. Introduced progressive muscle relaxation. Briefly discussed getting into a practice of meditation again. Finally, discussed finding frequent exercise as a part of anxiety. This is a bit more difficult as she has a bad knee which has been limiting for her. Pt is committed to trying out tech. Prior to next session.        9/27/22 DA  Met with pt for initial assessment. Reports several years ago she had a significant head injury which resulted in her inability to sleep. She reports anxiety followed shortly after. Since April of this year she has been experiencing more depressive sx. Her anxiety is mostly around wanting to feel better from her anxiety and not having positive results. Some of the depressive sx she has been having is poor appetite, lack of interest, struggle with focus, low energy, and has had on very rare occasions fleeting thoughts of wishing she were dead to end the pain of depression. Her anxiety sx often are in the form of nervousness, restlessness, and rumination. She used to have panic attacks each morning, however this has subsided for several months now. She has not attempted therapy before. She would like to gain skills relating to managing anxiety. She currently is working with Dr. Pompa for medications.     Progress on Treatment  Objective(s) / Homework:  Minimal progress - PRECONTEMPLATION (Not seeing need for change); Intervened by educating the patient about the effects of current behavior on health.  Evoked information about reasons to continue behavior, express concern / recommendations, and explored any change talk    Motivational Interviewing    MI Intervention: Expressed Empathy/Understanding, Supported Autonomy, Collaboration, Evocation, Open-ended questions, Change talk (evoked) and Reframe     Change Talk Expressed by the Patient: Desire to change Ability to change Reasons to change Need to change    Provider Response to Change Talk: E - Evoked more info from patient about behavior change, A - Affirmed patient's thoughts, decisions, or attempts at behavior change, R - Reflected patient's change talk and S - Summarized patient's change talk statements      Care Plan review completed: Yes    Medication Review:  No changes to current psychiatric medication(s)    Medication Compliance:  Yes    Changes in Health Issues:   None reported    Chemical Use Review:   Substance Use: Chemical use reviewed, no active concerns identified      Tobacco Use: No current tobacco use.      Assessment: Current Emotional / Mental Status (status of significant symptoms):  Risk status (Self / Other harm or suicidal ideation)  Patient denies a history of suicidal ideation, suicide attempts, self-injurious behavior, homicidal ideation, homicidal behavior and and other safety concerns  Patient denies current fears or concerns for personal safety.  Patient denies current or recent suicidal ideation or behaviors.  Patient denies current or recent homicidal ideation or behaviors.  Patient denies current or recent self injurious behavior or ideation.  Patient denies other safety concerns.  A safety and risk management plan has not been developed at this time, however patient was encouraged to call [place] [phone] should there be a change in any of these risk  factors.    Appearance:   Appropriate   Eye Contact:   Good   Psychomotor Behavior: Normal   Attitude:   Cooperative   Orientation:   All  Speech   Rate / Production: Normal    Volume:  Normal   Mood:    Anxious  Depressed   Affect:    Appropriate   Thought Content:  Clear   Thought Form:  Coherent  Logical   Insight:    Good     Diagnoses:  1. Generalized anxiety disorder    2. Insomnia, unspecified type        Collateral Reports Completed:  Not Applicable    Plan: (Homework, other):  Patient was given information about behavioral services and encouraged to schedule a follow up appointment with the clinic [facility] in 1 week.  She was also given information about mental health symptoms and treatment options .  CD Recommendations: No indications of CD issues.  Erika Francisco MSW LICSW      ______________________________________________________________________    Integrated Primary Care Behavioral Health Treatment Plan    Patient's Name: Jolynn Haji  YOB: 1955    Date of Creation: 10/5/22  Date Treatment Plan Last Reviewed/Revised: 10/5/22    DSM5 Diagnoses: 300.02 (F41.1) Generalized Anxiety Disorder  Psychosocial / Contextual Factors: Individual Factors high anxiety associated with depressive sx, interfereing with ability to function as she would like.  .  PROMIS (reviewed every 90 days):     Referral / Collaboration:  Was/were discussed and patient will pursue. - individual therapy    Anticipated number of session for this episode of care: 5-8 sessions  Anticipation frequency of session: Weekly  Anticipated Duration of each session: 38-52 minutes  Treatment plan will be reviewed in 90 days or when goals have been changed.       MeasurableTreatment Goal(s) related to diagnosis / functional impairment(s)  Goal 1: Patient will work with provider to manage symptoms    I will know I've met my goal when when less anxious and feeling down.      Objective #A (Patient Action)    Patient will  attend all  sessions.  Status: New - Date: 10/5/22     Intervention(s)  Therapist will teach educate patient on treatment options, clarify concerns, work with pt to overcome any resistance to compliance..    Objective #B  Patient will identify 3 fears / thoughts that contribute to feeling anxious.  Status: New - Date: 10/5/22     Intervention(s)  Therapist will educate patient on treatment options, clarify concerns, work with pt to overcome any resistance to compliance..    Objective #C  Patient will identify 3 initial signs or symptoms of anxiety.  Status: New - Date: 10/5/22     Intervention(s)  Therapist will educate patient on treatment options, clarify concerns, work with pt to overcome any resistance to compliance..        Patient has reviewed and agreed to the above plan.      Erika Francisco, DINESH  November 29, 2022

## 2022-11-29 ENCOUNTER — VIRTUAL VISIT (OUTPATIENT)
Dept: BEHAVIORAL HEALTH | Facility: CLINIC | Age: 67
End: 2022-11-29
Payer: COMMERCIAL

## 2022-11-29 DIAGNOSIS — G47.00 INSOMNIA, UNSPECIFIED TYPE: ICD-10-CM

## 2022-11-29 DIAGNOSIS — F41.1 GENERALIZED ANXIETY DISORDER: Primary | ICD-10-CM

## 2022-11-29 PROCEDURE — 90837 PSYTX W PT 60 MINUTES: CPT | Mod: 95 | Performed by: SOCIAL WORKER

## 2022-12-06 ASSESSMENT — ANXIETY QUESTIONNAIRES
GAD7 TOTAL SCORE: 3
GAD7 TOTAL SCORE: 3
6. BECOMING EASILY ANNOYED OR IRRITABLE: NOT AT ALL
GAD7 TOTAL SCORE: 3
7. FEELING AFRAID AS IF SOMETHING AWFUL MIGHT HAPPEN: NOT AT ALL
IF YOU CHECKED OFF ANY PROBLEMS ON THIS QUESTIONNAIRE, HOW DIFFICULT HAVE THESE PROBLEMS MADE IT FOR YOU TO DO YOUR WORK, TAKE CARE OF THINGS AT HOME, OR GET ALONG WITH OTHER PEOPLE: SOMEWHAT DIFFICULT
1. FEELING NERVOUS, ANXIOUS, OR ON EDGE: SEVERAL DAYS
5. BEING SO RESTLESS THAT IT IS HARD TO SIT STILL: NOT AT ALL
4. TROUBLE RELAXING: NOT AT ALL
7. FEELING AFRAID AS IF SOMETHING AWFUL MIGHT HAPPEN: NOT AT ALL
3. WORRYING TOO MUCH ABOUT DIFFERENT THINGS: SEVERAL DAYS
IF YOU CHECKED OFF ANY PROBLEMS ON THIS QUESTIONNAIRE, HOW DIFFICULT HAVE THESE PROBLEMS MADE IT FOR YOU TO DO YOUR WORK, TAKE CARE OF THINGS AT HOME, OR GET ALONG WITH OTHER PEOPLE: SOMEWHAT DIFFICULT
8. IF YOU CHECKED OFF ANY PROBLEMS, HOW DIFFICULT HAVE THESE MADE IT FOR YOU TO DO YOUR WORK, TAKE CARE OF THINGS AT HOME, OR GET ALONG WITH OTHER PEOPLE?: SOMEWHAT DIFFICULT
4. TROUBLE RELAXING: NOT AT ALL
8. IF YOU CHECKED OFF ANY PROBLEMS, HOW DIFFICULT HAVE THESE MADE IT FOR YOU TO DO YOUR WORK, TAKE CARE OF THINGS AT HOME, OR GET ALONG WITH OTHER PEOPLE?: SOMEWHAT DIFFICULT
2. NOT BEING ABLE TO STOP OR CONTROL WORRYING: SEVERAL DAYS
3. WORRYING TOO MUCH ABOUT DIFFERENT THINGS: SEVERAL DAYS
2. NOT BEING ABLE TO STOP OR CONTROL WORRYING: SEVERAL DAYS
GAD7 TOTAL SCORE: 3
7. FEELING AFRAID AS IF SOMETHING AWFUL MIGHT HAPPEN: NOT AT ALL
6. BECOMING EASILY ANNOYED OR IRRITABLE: NOT AT ALL
5. BEING SO RESTLESS THAT IT IS HARD TO SIT STILL: NOT AT ALL
7. FEELING AFRAID AS IF SOMETHING AWFUL MIGHT HAPPEN: NOT AT ALL
GAD7 TOTAL SCORE: 3
1. FEELING NERVOUS, ANXIOUS, OR ON EDGE: SEVERAL DAYS
GAD7 TOTAL SCORE: 3

## 2022-12-06 NOTE — PROGRESS NOTES
[facility] Behavioral [facility]  December 7, 2022      Behavioral Health Clinician Progress Note    Patient Name: Jolynn Haji           Service Type:  Individual      Service Location:   MyChart / Email (patient reached)     Session Start Time: 10:30am  Session End Time: 11:14am      Session Length: 53 - 60      Attendees: Patient     Service Modality:  Video Visit:      Provider verified identity through the following two step process.  Patient provided:  Patient is known previously to provider    Telemedicine Visit: The patient's condition can be safely assessed and treated via synchronous audio and visual telemedicine encounter.      Reason for Telemedicine Visit: Patient has requested telehealth visit    Originating Site (Patient Location): Patient's home    Distant Site (Provider Location): [facility]    Consent:  The patient/guardian has verbally consented to: the potential risks and benefits of telemedicine (video visit) versus in person care; bill my insurance or make self-payment for services provided; and responsibility for payment of non-covered services.     Patient would like the video invitation sent by:  My Chart    Mode of Communication:  Video Conference via [employer]    As the provider I attest to compliance with applicable laws and regulations related to telemedicine.    Visit Activities (Refresh list every visit): [facility] Only    Diagnostic Assessment Date: 9/27/22  Treatment Plan Review Date: 10/5/22  See Flowsheets for today's PHQ-9 and TIFFANY-7 results  Previous PHQ-9:   PHQ-9 SCORE 9/27/2022 9/29/2022 10/28/2022   PHQ-9 Total Score - - -   PHQ[facility]9 [facility] Score [facility] 6 (Mild depression) 7 (Mild depression) 5 (Mild depression)   PHQ-9 Total Score 6 7 5   Some encounter information is confidential and restricted. Go to Review Flowsheets activity to see all data.     Previous TIFFANY-7:   TIFFANY-7 SCORE 8/25/2022 9/27/2022 10/28/2022   Total  Score - - -   Total Score 8 (mild anxiety) 9 (mild anxiety) 7 (mild anxiety)   Total Score 8 9 7       KARINA LEVEL:  KARINA Score (Last Two) 8/27/2010 2/22/2011   KARINA Raw Score 51 49   Activation Score 91.6 82.8   KARINA Level 4 4       DATA  Extended Session (60+ minutes): No  Interactive Complexity: No  Crisis: No  [facility] Patient: No    Treatment Objective(s) Addressed in This Session:  Target Behavior(s): anxiety and depression    Depressed Mood: Increase interest, engagement, and pleasure in doing things  Decrease frequency and intensity of feeling down, depressed, hopeless  Improve quantity and quality of night time sleep / decrease daytime naps  Feel less tired and more energy during the day   Improve diet, appetite, mindful eating, and / or meal planning  Identify negative self-talk and behaviors: challenge core beliefs, myths, and actions  Improve concentration, focus, and mindfulness in daily activities   Feel less fidgety, restless or slow in daily activities / interpersonal interactions  Anxiety: will experience a reduction in anxiety, will develop more effective coping skills to manage anxiety symptoms, will develop healthy cognitive patterns and beliefs and will increase ability to function adaptively    Current Stressors / Issues:  Met with pt this morning, reports she is pretty good. She continues to work on breathing techs. She reports around 5pm is when she is needing to use it. Tends to be more anxious around 4:30-5:30pm. Then anxiety resolves and she can proceed with her evening. Reports she has been socializing with her friends for lunch and [place] shopping. Reports some anxiety about in socializing but has seen a great amount of improvement. She has been attending [employer] frequently however a headache has been interfering. She wants to discuss headaches and a slump time in the afternoon with Dr. Pompa on Friday. Discussed inflexibility and working on allowing for change in her plans. Discussed rigidness  to her scheduling.      11/29/22  Met with pt this morning. Reports some anxiety with logging on to the appt. Reports Thanksgiving went really well despite making changes to the normal traditions. States that she also had family over for her husbands birthday and cooked dinner for everyone. She has not done this in many months and she stated it felt really good to do. Reports some anxiety around [place] shopping. As an alternative she has been ordering gifts online this year. Sleep continues to improve. States she sleep 7 hours last night. She continues to attend to self talk. Discussed check ins with self to assess level of anxiety.       11/15/22  Met with pt today for follow-up. She reports doing really well. She went out to dinner with family. She really enjoyed herself at dinner which is a difference from several months ago. She is really enjoying spending time with her grand children. On Sunday she drove on her own from the first time in a very long time. [facility] validated and praised pts efforts. Reports still is having some general anxiety through out the day. She is looking forward to a sleep over with her julio. Discussed finding ways to access the comforts of meditation on the go. Discussed the up coming holidays and the work she is doing to avoid negative impact on her mental health. Reports having headaches consistently and an afternoon slump/tiredness.     11/9/22  Met with pt this morning. Report 6-7 hours of sleep which is an improvement from 3-4 hours. Reports the daily anxiety has greatly improved. States her anxiety is about half the amount that it was since last. She has been engaging socially. States she even has begun to attend [employer] again. She has been active in acknowledging the anxiety more objectively. She continues with her exercises. She has not had as much fog in the afternoon. Reinforced the need to continue practicing the tools she finds helpful from therapy. Normalized setbacks.  "Discussed SI from last session. Reports she has noticed them decreasing. Continues to feel the thoughts are highly unwanted and not matching how she feels. Denies plans or intentions.         10/31/22  Met with pt this morning. She states she is doing well today. Reports going to bed a bit later to possibly avoid such an early waking. Anxiety has been somewhat improved since last visit. Continues to have some physical sx such as chest tightening. Continues to have nervous feeling through the day. Continues to have anxiety when doing things socially. She has been writing a journal with a gratitude list. Exercising daily with walking the dog. Using the Calm lorena for meditation and imagery. She is working on acknowledging the anxiety and attempting to not  it. Unexpected events or plans tend to bring up a lot of anxiety. Discussed anxiety about planning for Thanksgiving. She is not feeling up to planning and doing the prep. She is having trouble with frustration with self. Discussed acceptance around the progress of her mental health and listening to family who are offering her a lot of support.     Pt brought up the reoccurring thoughts she has been having where she thinks to herself \"I wish I were dead.\" This thought comes automatically when she is feeling not up for doing something. States this is not a true feeling of hers but the thought continues to enter her mind. She is unnerved by this at times. She denies plan or intention. She is concerned it could be medication related as she noticed it start around the time of starting meds for anxiety. She describes these thoughts as fleeting and easily controlled. [facility] communicated this information with Dr. Pompa, pts psychiatrist. Dr. Pompa will touch base with pt via [employer].       10/14/22  Met with pt this morning who is reporting anxiety has improved a bit since last visit. States she has been active in body scanning, incorporating meditation back into her life " "and use of imagery. Discussed continued engagement in anxiety provoking situations. Discussed attempting to observe anxiety in a non- mental and objective manner. States people around her have noticed an improvement in her sx however she continues to feel \"like a wreck inside.\" Finally, discussed examining situations in which she is attempting to have control over. Worked on identifying areas in which she has no or little control. Discussed attempts to let go. Does report some issues with sleep on fast release Ambien. Pt has follow up appt with Dr. Pompa in just over a week. She has been taking long release Ambien which allows her to sleep longer throughout the night. Encouraged her to reach out to provider prior to appt if issues worsen.     10/5/22  Reports doing well today. She has been trying to remain social. She tends to have an initial panic stricken feeling when committing to something with others. Since last med adjustment appears more calmer. Discussed areas in her life which appear to cause most of her anxiety. One area is around situations she has little control over, such as the result of her mental health. Discussed focus on acceptance of current state of mental health and focus on things she does have control over such as active steps she is taking to help her mental health. Discussed deep breathing exercise as a way of calming self with highly anxious. Introduced progressive muscle relaxation. Briefly discussed getting into a practice of meditation again. Finally, discussed finding frequent exercise as a part of anxiety. This is a bit more difficult as she has a bad knee which has been limiting for her. Pt is committed to trying out tech. Prior to next session.        9/27/22 DA  Met with pt for initial assessment. Reports several years ago she had a significant head injury which resulted in her inability to sleep. She reports anxiety followed shortly after. Since April of this year she has been " experiencing more depressive sx. Her anxiety is mostly around wanting to feel better from her anxiety and not having positive results. Some of the depressive sx she has been having is poor appetite, lack of interest, struggle with focus, low energy, and has had on very rare occasions fleeting thoughts of wishing she were dead to end the pain of depression. Her anxiety sx often are in the form of nervousness, restlessness, and rumination. She used to have panic attacks each morning, however this has subsided for several months now. She has not attempted therapy before. She would like to gain skills relating to managing anxiety. She currently is working with Dr. Pompa for medications.     Progress on Treatment Objective(s) / Homework:  Minimal progress - PRECONTEMPLATION (Not seeing need for change); Intervened by educating the patient about the effects of current behavior on health.  Evoked information about reasons to continue behavior, express concern / recommendations, and explored any change talk    Motivational Interviewing    MI Intervention: Expressed Empathy/Understanding, Supported Autonomy, Collaboration, Evocation, Open-ended questions, Change talk (evoked) and Reframe     Change Talk Expressed by the Patient: Desire to change Ability to change Reasons to change Need to change    Provider Response to Change Talk: E - Evoked more info from patient about behavior change, A - Affirmed patient's thoughts, decisions, or attempts at behavior change, R - Reflected patient's change talk and S - Summarized patient's change talk statements      Care Plan review completed: Yes    Medication Review:  No changes to current psychiatric medication(s)    Medication Compliance:  Yes    Changes in Health Issues:   None reported    Chemical Use Review:   Substance Use: Chemical use reviewed, no active concerns identified      Tobacco Use: No current tobacco use.      Assessment: Current Emotional / Mental Status (status of  significant symptoms):  Risk status (Self / Other harm or suicidal ideation)  Patient denies a history of suicidal ideation, suicide attempts, self-injurious behavior, homicidal ideation, homicidal behavior and and other safety concerns  Patient denies current fears or concerns for personal safety.  Patient denies current or recent suicidal ideation or behaviors.  Patient denies current or recent homicidal ideation or behaviors.  Patient denies current or recent self injurious behavior or ideation.  Patient denies other safety concerns.  A safety and risk management plan has not been developed at this time, however patient was encouraged to call [place] [phone] should there be a change in any of these risk factors.    Appearance:   Appropriate   Eye Contact:   Good   Psychomotor Behavior: Normal   Attitude:   Cooperative   Orientation:   All  Speech   Rate / Production: Normal    Volume:  Normal   Mood:    Anxious  Depressed   Affect:    Appropriate   Thought Content:  Clear   Thought Form:  Coherent  Logical   Insight:    Good     Diagnoses:  1. Generalized anxiety disorder    2. Insomnia, unspecified type        Collateral Reports Completed:  Not Applicable    Plan: (Homework, other):  Patient was given information about behavioral services and encouraged to schedule a follow up appointment with the clinic [facility] in 1 week.  She was also given information about mental health symptoms and treatment options .  CD Recommendations: No indications of CD issues.  Erika Francisco [facility]ICSW      ______________________________________________________________________    Integrated Primary Care Behavioral Health Treatment Plan    Patient's Name: Jolynn Haji  YOB: 1955    Date of Creation: 10/5/22  Date Treatment Plan Last Reviewed/Revised: 10/5/22    DSM5 Diagnoses: 300.02 (F41.1) Generalized Anxiety Disorder  Psychosocial / Contextual Factors: Individual Factors high anxiety associated with depressive sx,  interfereing with ability to function as she would like.  .  PROMIS (reviewed every 90 days):     Referral / Collaboration:  Was/were discussed and patient will pursue. - individual therapy    Anticipated number of session for this episode of care: 5-8 sessions  Anticipation frequency of session: Weekly  Anticipated Duration of each session: 38-52 minutes  Treatment plan will be reviewed in 90 days or when goals have been changed.       MeasurableTreatment Goal(s) related to diagnosis / functional impairment(s)  Goal 1: Patient will work with provider to manage symptoms    I will know I've met my goal when when less anxious and feeling down.      Objective #A (Patient Action)    Patient will  attend all sessions.  Status: New - Date: 10/5/22     Intervention(s)  Therapist will teach educate patient on treatment options, clarify concerns, work with pt to overcome any resistance to compliance..    Objective #B  Patient will identify 3 fears / thoughts that contribute to feeling anxious.  Status: New - Date: 10/5/22     Intervention(s)  Therapist will educate patient on treatment options, clarify concerns, work with pt to overcome any resistance to compliance..    Objective #C  Patient will identify 3 initial signs or symptoms of anxiety.  Status: New - Date: 10/5/22     Intervention(s)  Therapist will educate patient on treatment options, clarify concerns, work with pt to overcome any resistance to compliance..        Patient has reviewed and agreed to the above plan.      DINESH Whipple  December 7, 2022  Answers for HPI/ROS submitted by the patient on 12/6/2022  TIFFANY 7 TOTAL SCORE: 3

## 2022-12-07 ENCOUNTER — VIRTUAL VISIT (OUTPATIENT)
Dept: BEHAVIORAL HEALTH | Facility: CLINIC | Age: 67
End: 2022-12-07
Payer: COMMERCIAL

## 2022-12-07 DIAGNOSIS — F41.1 GENERALIZED ANXIETY DISORDER: Primary | ICD-10-CM

## 2022-12-07 DIAGNOSIS — G47.00 INSOMNIA, UNSPECIFIED TYPE: ICD-10-CM

## 2022-12-07 PROCEDURE — 90834 PSYTX W PT 45 MINUTES: CPT | Mod: 95 | Performed by: SOCIAL WORKER

## 2022-12-09 ENCOUNTER — VIRTUAL VISIT (OUTPATIENT)
Dept: BEHAVIORAL HEALTH | Facility: CLINIC | Age: 67
End: 2022-12-09
Payer: COMMERCIAL

## 2022-12-09 ENCOUNTER — VIRTUAL VISIT (OUTPATIENT)
Dept: PSYCHIATRY | Facility: CLINIC | Age: 67
End: 2022-12-09
Payer: COMMERCIAL

## 2022-12-09 DIAGNOSIS — Z87.820 HISTORY OF TRAUMATIC BRAIN INJURY: ICD-10-CM

## 2022-12-09 DIAGNOSIS — G47.00 INSOMNIA, UNSPECIFIED TYPE: ICD-10-CM

## 2022-12-09 DIAGNOSIS — F41.1 GENERALIZED ANXIETY DISORDER: Primary | ICD-10-CM

## 2022-12-09 DIAGNOSIS — F39 MOOD DISORDER (H): ICD-10-CM

## 2022-12-09 DIAGNOSIS — F41.1 GAD (GENERALIZED ANXIETY DISORDER): Primary | ICD-10-CM

## 2022-12-09 DIAGNOSIS — G21.19 DRUG-INDUCED PARKINSONISM (H): ICD-10-CM

## 2022-12-09 PROCEDURE — 90832 PSYTX W PT 30 MINUTES: CPT | Mod: 95 | Performed by: PSYCHOLOGIST

## 2022-12-09 PROCEDURE — 99214 OFFICE O/P EST MOD 30 MIN: CPT | Mod: 95 | Performed by: PSYCHIATRY & NEUROLOGY

## 2022-12-09 NOTE — PROGRESS NOTES
[facility] Psychiatry [facility]  December 9, 2022      Behavioral Health Clinician Progress Note    Patient Name: Jolynn Haji           Service Type:  Individual      Service Location:   [facility]     Session Start Time: 10:00 am  Session End Time: 10:20 am      Session Length: 16 - 37      Attendees: Patient     Service Modality:  Video Visit:      Provider verified identity through the following two step process.  Patient provided:  Patient is known previously to provider    Telemedicine Visit: The patient's condition can be safely assessed and treated via synchronous audio and visual telemedicine encounter.      Reason for Telemedicine Visit: Services only offered telehealth    Originating Site (Patient Location): Patient's home    Distant Site (Provider Location): Provider Remote Setting- Home Office    Consent:  The patient/guardian has verbally consented to: the potential risks and benefits of telemedicine (video visit) versus in person care; bill my insurance or make self-payment for services provided; and responsibility for payment of non-covered services.     Patient would like the video invitation sent by:  My Chart    Mode of Communication:  Video Conference via [facility]    As the provider I attest to compliance with applicable laws and regulations related to telemedicine.    Visit Activities (Refresh list every visit): [facility] [place] Only    Diagnostic Assessment Date: 05/12/2022  Treatment Plan Review Date: 12/01/2022  See Flowsheets for today's PHQ-9 and TIFFANY-7 results  Previous PHQ-9:   PHQ-9 SCORE 9/27/2022 9/29/2022 10/28/2022   PHQ-9 Total Score - - -   PHQ-9 Total Score MyChart 6 (Mild depression) 7 (Mild depression) 5 (Mild depression)   PHQ-9 Total Score 6 7 5   Some encounter information is confidential and restricted. Go to Review Flowsheets activity to see all data.     Previous TIFFANY-7:   TIFFANY-7 SCORE 10/28/2022 12/6/2022 12/6/2022   Total Score - - -   Total Score 7 (mild  "anxiety) - 3 (minimal anxiety)   Total Score 7 3 3       KARINA LEVEL:  KARINA Score (Last Two) 8/27/2010 2/22/2011   KARINA Raw Score 51 49   Activation Score 91.6 82.8   KARINA Level 4 4       DATA  Extended Session (60+ minutes): No  Interactive Complexity: No  Crisis: No  [facility] Patient: No    Treatment Objective(s) Addressed in This Session:  Target Behavior(s): anxiety    Anxiety: will develop more effective coping skills to manage anxiety symptoms    Current Stressors / Issues:  Reported that she has been doing well overall. She spoke about having her grandchildren over recently and was pleased that she was able to watch them for 6 hours unsupervised and found it very pleasurable. She has been having headaches and believes it might be from Effexor. She started having them over the past month and has them most days and she estimated that they are a 5/10 in pain/discomfort. Her sleep is still sleeping roughly 6-7 hours and wakes up rested. Late afternoon (4-5 pm) she is more fatigued. She usually takes a few minutes to sit down and use the Calm lorena and she feels she gets a \"second wind\" and has sufficient energy for the rest of the day. She has been meeting with Erika and enjoys their time together and finds it helpful for learning strategies to manage her thoughts.      11/07/2022 (Erika Mcneal, [facility]):  Met with pt this morning, reports she is pretty good. She continues to work on breathing techs. She reports around 5pm is when she is needing to use it. Tends to be more anxious around 4:30-5:30pm. Then anxiety resolves and she can proceed with her evening. Reports she has been socializing with her friends for lunch and [place] shopping. Reports some anxiety about in socializing but has seen a great amount of improvement. She has been attending [employer] frequently however a headache has been interfering. She wants to discuss headaches and a slump time in the afternoon with Dr. Pompa on Friday. Discussed inflexibility and " working on allowing for change in her plans. Discussed rigidness to her scheduling.         11/29/2022 (Erika Mcneal [facility]):  Met with pt this morning. Reports some anxiety with logging on to the appt. Reports Thanksgiving went really well despite making changes to the normal traditions. States that she also had family over for her husbands birthday and cooked dinner for everyone. She has not done this in many months and she stated it felt really good to do. Reports some anxiety around [place] shopping. As an alternative she has been ordering gifts online this year. Sleep continues to improve. States she sleep 7 hours last night. She continues to attend to self talk. Discussed check ins with self to assess level of anxiety.          11/15/2022 (Erika Mcneal [facility]):  Met with pt today for follow-up. She reports doing really well. She went out to dinner with family. She really enjoyed herself at dinner which is a difference from several months ago. She is really enjoying spending time with her grand children. On Sunday she drove on her own from the first time in a very long time. [facility] validated and praised pts efforts. Reports still is having some general anxiety through out the day. She is looking forward to a sleep over with her ganchuyren. Discussed finding ways to access the comforts of meditation on the go. Discussed the up coming holidays and the work she is doing to avoid negative impact on her mental health. Reports having headaches consistently and an afternoon slump/tiredness.        11/09/2022 (Erika Mcneal [facility]):  Met with pt this morning. Report 6-7 hours of sleep which is an improvement from 3-4 hours. Reports the daily anxiety has greatly improved. States her anxiety is about half the amount that it was since last. She has been engaging socially. States she even has begun to attend [employer] again. She has been active in acknowledging the anxiety more objectively. She continues with her exercises. She  "has not had as much fog in the afternoon. Reinforced the need to continue practicing the tools she finds helpful from therapy. Normalized setbacks. Discussed SI from last session. Reports she has noticed them decreasing. Continues to feel the thoughts are highly unwanted and not matching how she feels. Denies plans or intentions.            10/31/2022 (Erika Mcneal, [facility]):  Met with pt this morning. She states she is doing well today. Reports going to bed a bit later to possibly avoid such an early waking. Anxiety has been somewhat improved since last visit. Continues to have some physical sx such as chest tightening. Continues to have nervous feeling through the day. Continues to have anxiety when doing things socially. She has been writing a journal with a gratitude list. Exercising daily with walking the dog. Using the Calm lorena for meditation and imagery. She is working on acknowledging the anxiety and attempting to not  it. Unexpected events or plans tend to bring up a lot of anxiety. Discussed anxiety about planning for Thanksgiving. She is not feeling up to planning and doing the prep. She is having trouble with frustration with self. Discussed acceptance around the progress of her mental health and listening to family who are offering her a lot of support.      Pt brought up the reoccurring thoughts she has been having where she thinks to herself \"I wish I were dead.\" This thought comes automatically when she is feeling not up for doing something. States this is not a true feeling of hers but the thought continues to enter her mind. She is unnerved by this at times. She denies plan or intention. She is concerned it could be medication related as she noticed it start around the time of starting meds for anxiety. She describes these thoughts as fleeting and easily controlled. [facility] communicated this information with Dr. Pompa, pts psychiatrist. Dr. Pompa will touch base with pt via [employer].   " "      10/24/2022:  Reported that going to the quick release Ambien was not helpful for her. She was falling asleep quickly but not staying asleep. She would wake up within about 3 hours and could not go back to sleep. She did that for 3 nights and then went back to the Ambien CR which was better because she could get about 6 hours that way. She is noticing her anxiety is still present. It is better but \"I don't think it's normal. I'm anxious about things that I shouldn't feel nervous about.\" She is working with Erika to continue learning skills to manage the anxiety but is open to medication adjustments if it will help even further.      10/03/2022:  Reported that she continues to wake up with anxiety. Approximately 3 days a week, she has an experience of \"hitting a wall,\" feeling more fatigued, and her mind is \"scattered\" such that she goes from job to job without finishing any of them. She spoke about how she has been continuing to engage in activities \"but it takes a lot of effort sometimes.\" She acknowledged that the anxiety does get better after she gets started. She is starting therapy soon and hopes to learn more skills to manage her anxiety better.      09/01/2022 (Pily Betts, [facility]):  MH Update: still not sleeping well, getting nauseous and worried it could be related to the Ambien or possibly the Effexor. Mood continues to improve but \"anxious to feel better.\" Has been spending time with friends and finds that helpful. Has moments of feeling \"thick headed\" during the day and worries is related to Ambien. Energy has increased during the day, able to accomplish more. Better concentrate and comprehending what she does read. [facility] validated patient for her continued distress.  [facility] normalized patient needing to allow herself, her brain and her medication more time to adjust to all of these changes.  [facility] processed patient using kindness with herself and trying not to compare how she is doing now with how she " was doing in the past.  [facility] discussed patient using the idea of pacing and focusing on doing something for a small period of time instead of feeling that she needs to accomplish the entire task.  [facility] also discussed patient allowing family to help and simplifying where she can.      Patient indicates she saw a neurologist this week in order to establish baseline.  She was told by that provider but there was nothing to worry about and not to have anxiety about her health.  Patient has follow-up in 3 months.  Patient is hopeful that the symptoms she was experiencing were related to side effects of Abilify and not something more.    SI: passive thoughts of wishing life would get better, how long can I live like this. Denies having a plan or intent. No prior attempts. Has people and things to live for. Able to redirect self and feels safe. Has supports she can reach out to and aware of crisis resources.    Tx: not in therapy, [facility] to place referral for [facility] and health psychologist    Sleep: able to fall asleep but then wakes up at 3 am and cant get back to sleep. Not noticing any anxious thoughts. Sometimes wakes up due to nausea and waits for it to pass.  [facility] used CBT I to review sleep practices and praised patient for having a healthy routine and relaxation time.  [facility] discussed safe space visualization and patient trying to use this when she wakes up to see if it assists with her getting back to sleep.  Appetite: improved, had lost 30 pounds and feels that is getting back to better place    Etoh: denies  Substance: denies  Nicotine: denies  Caffeine: 1 cup of coffee per day    Most Important: side effect of feeling nauseous and still not sleeping well, sometimes nausea that wakes up. Is there an Ambien slow release or something else to stop the nausea?      08/11/2022:  Reported that she continues to improve. She stated that she is no longer walking in a shuffle. Her anxiety is much better. She is no longer waking up  "with intense anxiety. She is more active, socializing more often, and she is very pleased with her progress. She has noticed that every 4 days or so she wakes up at night after 3 hours and feels rested. She will stay in bed but will plan the day. Sometimes she feels tired later in the day and sometimes she does not. She said she had a concussion 4 years ago and \"I did not sleep at all for about a month.\" She would like to know if Ambien is addictive or if there are other options that might not be addictive. She found the 2.5mg did not help as much as 5mg. She would also like to know if she can take something late at night when she cannot go back to sleep. She also has questions about whether the venlafaxine is causing her sleep problems at night. She would also like to discuss the plan for the Lexapro. We spoke about ways to learn some skills to better manage worry.      07/28/2022:  Reported that she was doing well until about 10 days ago when she started waking up with significant anxiety for several hours rating the intensity as 8/10. \"I've never had that before. I had to cancel coffee with my friend because I was so anxious.\" She described the anxiety as her heart racing and breathing heavy. She notices that the anxiety is better in the afternoon and gone by the evening. Mirtazapine helped initially for 2 days, but not as much now. The increase in Lexapro has been fine. She would like to know why she might be waking up with this kind of anxiety in the mornings and when it might end.       06/14/2022:  Reported that she has not been able to notice a difference by lowering the Abilify. Reported that she is still feeling depressed and does not feel it has gotten any better/worse than when she first started with [facility]. She is waking up with cramps. She would like to know how long it will take for her to feel better and would like to know how much she needs to push herself to do things. She spoke about going to lunch " "with some friends and had some anticipation anxiety. She worried that she would not be able to stay focused on the conversation and participate. The anxiety did go away when she got there but she felt tired and had to leave about an hour later which is unusual as they usually have lunch for about 3 hours.       05/24/2022:  Reported that she can focus \"a little longer than before\" and she feels she is \"staying in the present\" rather than worrying about the next thing to come. Her sleep is a little better. Instead of waking up at 1:30am, she sleeps until about 4:30am, uses the bathroom, and she can go back to sleep. She still feels tired during the day and estimated that it is about the same as before. She said that she is able to complete tasks. \"I won't win any races but I can get them done now.\" She noted that she had no concerns about her current medications.      05/12/2022 (DA):  \"I'm hoping we can figure out what triggered this setback.\" She was diagnosed with anxiety and depression after a concussion a concussion in 04/2017. She had been sick at the time, got up one night and fell. She is uncertain where on her head she landed. She was on Lexapro, mirtazapine, and seroquel. She thought that it was \"fixed\", stopped taking the mirtazapine and seroquel but stayed on the lexapro. In January, started feeling more anxiety. PCP added Abilify which was helpful. The Lexapro was increased which she is not sure if it has been helpful (been on it for 4 weeks). She said, \"I feel like I'm in la-la land. I have a hard time thinking things through.\" Her anxiety and sleep is better but bothered by the slowed thinking.       Progress on Treatment Objective(s) / Homework:  Satisfactory progress - ACTION (Actively working towards change); Intervened by reinforcing change plan / affirming steps taken    Also provided psychoeducation about behavioral health condition, symptoms, and treatment options    Care Plan review completed: " Yes    Medication Review:  Changes to psychiatric medications, see updated Medication List in EPIC.     Medication Compliance:  Yes    Changes in Health Issues:   Yes: Sleep disturbance, Associated Psychological Distress    Chemical Use Review:   Substance Use: Chemical use reviewed, no active concerns identified      Tobacco Use: No current tobacco use.      Assessment: Current Emotional / Mental Status (status of significant symptoms):  Risk status (Self / Other harm or suicidal ideation)  Patient denies a history of suicidal ideation, suicide attempts, self-injurious behavior, homicidal ideation, homicidal behavior and and other safety concerns  Patient denies current fears or concerns for personal safety.  Patient reports the following current or recent suicidal ideation or behaviors: Patient reports passive thoughts about questioning life but denies that she was suicidal.  Patient does feel safe.  Patient denies current or recent homicidal ideation or behaviors.  Patient denies current or recent self injurious behavior or ideation.  Patient denies other safety concerns.  A safety and risk management plan has not been developed at this time, however patient was encouraged to call [place] [phone] should there be a change in any of these risk factors.    Appearance:   Appropriate   Eye Contact:   Good   Psychomotor Behavior: Normal   Attitude:   Cooperative   Orientation:   All  Speech   Rate / Production: Normal    Volume:  Normal   Mood:    Anxious   Affect:    Appropriate   Thought Content:  Clear   Thought Form:  Coherent  Logical   Insight:    Fair     Diagnoses:  1. Generalized anxiety disorder    2. Insomnia, unspecified type    3. Mood disorder (H)      Collateral Reports Completed:  Communicated with: Dr. Pompa    Plan: (Homework, other):  Patient was given information about behavioral services and encouraged to schedule a follow up appointment with the clinic [facility] in conjunction with next [facility]  appointment.  She was also given information about mental health symptoms and treatment options .  CD Recommendations: No indications of CD issues. Eran Landon, PsyD, LP      ______________________________________________________________________    [facility] Psychiatry Services [facility]: Treatment Plan    Patient's Name: Jolynn Haji  YOB: 1955    Date of Creation: May 24, 2022  Date Treatment Plan Last Reviewed/Revised: 12/09/2022    DSM5 Diagnoses: 300.02 (F41.1) Generalized Anxiety Disorder or 780.52 (G47.00) Insomnia Disorder   With non-sleep disorder mental comorbidity  Persistent    Psychosocial / Contextual Factors: family    PROMIS (reviewed every 90 days):   PROMIS 10-Global Health (only subscores and total score):   PROMIS-10 Scores Only 5/12/2022 5/12/2022 7/1/2022 9/27/2022 9/27/2022   Global Mental Health Score 9 9 17 10 10   Global Physical Health Score 17 17 13 17 17   PROMIS TOTAL - SUBSCORES 26 26 30 27 27       Referral / Collaboration:  The following referral(s) will be initiated: [facility] to place referral for either [facility] or health psychology services.    Anticipated number of session for this episode of care: 5-6  Anticipation frequency of session: As determined by Dr. Pompa  Anticipated Duration of each session: 16-37 minutes  Treatment plan will be reviewed in 90 days or when goals have been changed.       MeasurableTreatment Goal(s) related to diagnosis / functional impairment(s)  Goal 1: Patient will work with providers to manage symptoms    I will know I've met my goal when I can finish my tasks.      Objective #A (Patient Action)  Patient will attend all appointments, take medication as prescribed.  Status: Continued - Date(s):  12/09/2022    Intervention(s)  [facility] will Monitor and assist in overcoming barriers to treatment adherence    Objective #B  Patient will consider all recommendations offered.  Status: Continued - Date(s): 12/09/2022      Intervention(s)  [facility] will educate patient on treatment options, clarify concerns, work with pt to overcome any resistance to compliance.      Patient has reviewed and agreed to the above plan.      Anthony Landon PsyD  12/09/2022

## 2022-12-09 NOTE — PROGRESS NOTES
"Telemedicine Visit: The patient's condition can be safely assessed and treated via synchronous audio and visual telemedicine encounter.      Reason for Telemedicine Visit: Patient has requested telehealth visit    Originating Site (Patient Location): Patient's home    Distant Location (provider location):  Off-site    Consent:  The patient/guardian has verbally consented to: the potential risks and benefits of telemedicine (video visit) versus in person care; bill my insurance or make self-payment for services provided; and responsibility for payment of non-covered services.     Mode of Communication:  Video Conference via [employer]    As the provider I attest to compliance with applicable laws and regulations related to telemedicine.        Outpatient Psychiatric Progress Note    Name: Jolynn Haji   : 1955                    Primary Care Provider: Helga Ibarra MD   The[facility]    PHQ-9 scores:  PHQ-9 SCORE 2022 2022 10/28/2022   PHQ-9 Total Score - - -   PHQ-9 Total Score MyChart 6 (Mild depression) 7 (Mild depression) 5 (Mild depression)   PHQ-9 Total Score 6 7 5   Some encounter information is confidential and restricted. Go to Review Flowsheets activity to see all data.       TIFFANY-7 scores:  TIFFANY-7 SCORE 10/28/2022 2022 2022   Total Score - - -   Total Score 7 (mild anxiety) - 3 (minimal anxiety)   Total Score 7 3 3       Patient Identification:  Patient is a 67 year old,   White Choose not to answer female  who presents for return visit with me.  Patient is currently retired. Patient attended the phone/video session with her daughter Malini rucker. Patient prefers to be called: \" Azalea\".    Interim History:  I last saw Jolynn Haji for outpatient psychiatry return visit on 10/24/2022. During that appointment, we:     Continue Ambien CR 6.25 mg at bedtime as needed for sleep. Do not take with alcohol or opioid pain medication. Make sure to " "have 8-10 hours to devote to sleep after taking. Careful/caution for falls in middle of night. Discussed sleep medicine referral but declined at this time. Could consider in future.     Increase Effexor-XR/venlafaxine ER to 150 mg daily for depression and anxiety.     Continue ondansetron/Zofran 4 mg every 8 hrs as needed for nausea caused by Effexor-XR changes.    Continue to follow with neurology as they recommend.      Continue all other cares per primary care provider.     12/09: Patient overall doing well with recent dose increase of venlafaxine ER.  However, has been having some headaches from the medication.  Has been taking it at night.  Will start to have headaches mid to late afternoon.  So far it has not been too distressing.  Patient has been more active.  Sleeping okay.  Looking forward to the holidays.  Spending time with family.  Engaged in therapy with [facility].  No acute safety concerns.  No SI.  No problematic drug or alcohol use.    Per [facility], Dr. Eran Landon, during today's team-based visit:  Reported that she has been doing well overall. She spoke about having her grandchildren over recently and was pleased that she was able to watch them for 6 hours unsupervised and found it very pleasurable. She has been having headaches and believes it might be from Effexor. She started having them over the past month and has them most days and she estimated that they are a 5/10 in pain/discomfort. Her sleep is still sleeping roughly 6-7 hours and wakes up rested. Late afternoon (4-5 pm) she is more fatigued. She usually takes a few minutes to sit down and use the Calm lorena and she feels she gets a \"second wind\" and has sufficient energy for the rest of the day. She has been meeting with Erika and enjoys their time together and finds it helpful for learning strategies to manage her thoughts.    Past Psychiatric Med Trials:  Psych Meds at Intake:  lexapro 20 mg daily  abilify 5 mg daily      Past Psych " "Meds:  Amitriptyline  Quetiapine  Mirtazapine - stopped \"because I was sleeping through the night\"    Psychiatric ROS:  Jolynn RESTREPO Adithya reports mood has been: Better  Anxiety has been: A little better on increased dose of Effexor-XR  Sleep has been: See HPI above  Seema sxs: None  Psychosis sxs: N/A  ADHD/ADD sxs: N/A  PTSD sxs: N/A  PHQ9 and GAD7 scores were reviewed today if completed.   Medication side effects: Some fatigue and headaches late afternoon (patient takes medication at bedtime)  Current stressors include: Symptoms and See HPI above  Coping mechanisms and supports include: Family, Hobbies and Friends    Current medications include:   Current Outpatient Medications   Medication Sig     clindamycin (CLINDAGEL) 1 % external gel Apply topically daily     lactobacillus rhamnosus, GG, (CULTURELL) capsule Take 1 capsule by mouth daily     lisinopril (ZESTRIL) 2.5 MG tablet Take 1 tablet (2.5 mg) by mouth daily     ondansetron (ZOFRAN ODT) 4 MG ODT tab Take 1 tablet (4 mg) by mouth every 8 hours as needed for nausea     venlafaxine (EFFEXOR XR) 150 MG 24 hr capsule Take 1 capsule (150 mg) by mouth daily     zolpidem ER (AMBIEN CR) 6.25 MG CR tablet Take 1 tablet (6.25 mg) by mouth nightly as needed for sleep     Current Facility-Administered Medications   Medication     lidocaine 1 % 4 mL     lidocaine 1 % injection 3 mL     lidocaine 1 % injection 4 mL     triamcinolone (KENALOG-40) injection 40 mg     triamcinolone (KENALOG-40) injection 40 mg       Past Medical/Surgical History:  Past Medical History:   Diagnosis Date     Abnormal glandular Papanicolaou smear of cervix- ASCUS in 2000 12/5/2003    ASC H- 2000     Basal cell carcinoma of leg, right 05/2016    Dr Rogers     Colon polyps 05/2018    tubular adenoam - due 5 yrs - 2 mm     Concussion without loss of consciousness 4/12/2017     Hidradenitis     left  groin     HSIL (high grade squamous intraepithelial lesion) on Pap smear of cervix 7/5/2000    Normal " paps from 2001 to 2016/cc     Hypertension goal BP (blood pressure) < 140/90      OA (osteoarthritis) of knee     moderate     Postmenopausal since 7/2008      Shingles 7/23/2010    left      Squamous cell carcinoma of skin, unspecified      Synovial cyst of popliteal space       has a past medical history of Abnormal glandular Papanicolaou smear of cervix- ASCUS in 2000 (12/5/2003), Basal cell carcinoma of leg, right (05/2016), Colon polyps (05/2018), Concussion without loss of consciousness (4/12/2017), Hidradenitis, HSIL (high grade squamous intraepithelial lesion) on Pap smear of cervix (7/5/2000), Hypertension goal BP (blood pressure) < 140/90, OA (osteoarthritis) of knee, Postmenopausal (since 7/2008 ), Shingles (7/23/2010), Squamous cell carcinoma of skin, unspecified, and Synovial cyst of popliteal space.    She has no past medical history of Malignant melanoma (H) or Squamous cell carcinoma.    Social History:  Reviewed. No changes to social history except as noted above in HPI.    Vital Signs:   None. This is phone/video visit.     Labs:  Most recent laboratory results reviewed and the pertinent results include:   TSH   Date Value Ref Range Status   04/25/2022 1.30 0.40 - 4.00 mU/L Final   01/13/2021 2.07 0.40 - 4.00 mU/L Final     Lab Results   Component Value Date    WBC 8.1 04/25/2022    WBC 6.4 01/13/2021     Lab Results   Component Value Date    RBC 4.58 04/25/2022    RBC 4.54 01/13/2021     Lab Results   Component Value Date    HGB 13.6 04/25/2022    HGB 13.3 01/13/2021     Lab Results   Component Value Date    HCT 41.1 04/25/2022    HCT 41.7 01/13/2021     No components found for: MCT  Lab Results   Component Value Date    MCV 90 04/25/2022    MCV 92 01/13/2021     Lab Results   Component Value Date    MCH 29.7 04/25/2022    MCH 29.3 01/13/2021     Lab Results   Component Value Date    MCHC 33.1 04/25/2022    MCHC 31.9 01/13/2021     Lab Results   Component Value Date    RDW 12.9 04/25/2022    RDW  13.1 01/13/2021     Lab Results   Component Value Date     04/25/2022     01/13/2021     Last Comprehensive Metabolic Panel:  Sodium   Date Value Ref Range Status   04/25/2022 137 133 - 144 mmol/L Final   01/13/2021 140 133 - 144 mmol/L Final     Potassium   Date Value Ref Range Status   04/25/2022 4.4 3.4 - 5.3 mmol/L Final   01/13/2021 4.1 3.4 - 5.3 mmol/L Final     Chloride   Date Value Ref Range Status   04/25/2022 105 94 - 109 mmol/L Final   01/13/2021 108 94 - 109 mmol/L Final     Carbon Dioxide   Date Value Ref Range Status   01/13/2021 28 20 - 32 mmol/L Final     Carbon Dioxide (CO2)   Date Value Ref Range Status   04/25/2022 26 20 - 32 mmol/L Final     Anion Gap   Date Value Ref Range Status   04/25/2022 6 3 - 14 mmol/L Final   01/13/2021 4 3 - 14 mmol/L Final     Glucose   Date Value Ref Range Status   04/25/2022 104 (H) 70 - 99 mg/dL Final   01/13/2021 91 70 - 99 mg/dL Final     Comment:     Fasting specimen     Urea Nitrogen   Date Value Ref Range Status   04/25/2022 23 7 - 30 mg/dL Final   01/13/2021 18 7 - 30 mg/dL Final     Creatinine   Date Value Ref Range Status   04/25/2022 0.78 0.52 - 1.04 mg/dL Final   01/13/2021 0.68 0.52 - 1.04 mg/dL Final     GFR Estimate   Date Value Ref Range Status   04/25/2022 83 >60 mL/min/1.73m2 Final     Comment:     Effective December 21, 2021 eGFRcr in adults is calculated using the 2021 CKD-EPI creatinine equation which includes age and gender (Mercedes et al., NEJM, DOI: 10.1056/IQNRmq7055624)   01/13/2021 >90 >60 mL/min/[1.73_m2] Final     Comment:     Non  GFR Calc  Starting 12/18/2018, serum creatinine based estimated GFR (eGFR) will be   calculated using the Chronic Kidney Disease Epidemiology Collaboration   (CKD-EPI) equation.       Calcium   Date Value Ref Range Status   04/25/2022 9.3 8.5 - 10.1 mg/dL Final   01/13/2021 8.8 8.5 - 10.1 mg/dL Final     Bilirubin Total   Date Value Ref Range Status   04/25/2022 0.4 0.2 - 1.3 mg/dL  Final   01/13/2021 0.8 0.2 - 1.3 mg/dL Final     Alkaline Phosphatase   Date Value Ref Range Status   04/25/2022 64 40 - 150 U/L Final   01/13/2021 72 40 - 150 U/L Final     ALT   Date Value Ref Range Status   04/25/2022 21 0 - 50 U/L Final   01/13/2021 17 0 - 50 U/L Final     AST   Date Value Ref Range Status   04/25/2022 11 0 - 45 U/L Final   01/13/2021 13 0 - 45 U/L Final     Review of Systems:  10 systems (general, cardiovascular, respiratory, eyes, ENT, endocrine, GI, , M/S, neurological) were reviewed. Most pertinent finding(s) is/are: Some chronic pains, see HPI above. The remaining systems are all unremarkable.    Mental Status Examination (limited as this is by phone/video):  Appearance: Awake, alert, appears stated age, no acute distress, well-groomed   Attitude:  cooperative, pleasant   Motor: No gross abnormalities noted today.  Not formally tested  Oriented to:  person, place, time, and situation  Attention Span and Concentration:  normal  Speech:  Still a little delayed/hesitant responses-seems to continue to improve, normal volume  Language: intact  Mood: Pretty good  Affect: A little restricted still but overall mood congruent  Associations:  no loose associations  Thought Process:  logical, linear and goal oriented  Thought Content:  no evidence of suicidal ideation or homicidal ideation, no evidence of psychotic thought, no auditory hallucinations present and no visual hallucinations present  Recent and Remote Memory:  Not formally assessed. Possibly some deficits? Could consider neuropsychological testing in future  Fund of Knowledge: appropriate  Insight: Good  Judgment:  intact, adequate for safety  Impulse Control:  intact    Suicide Risk Assessment:  Today Jolynn Haji reports no suicidal ideation. Based on all available evidence including the factors cited above, Jolynn Haji does not appear to be at imminent risk for self-harm, does not meet criteria for a 72-hr hold, and therefore remains  appropriate for ongoing outpatient level of care.  A thorough assessment of risk factors related to suicide and self-harm have been reviewed and are noted above. The patient convincingly denies suicidality on several occasions. Local community safety resources reviewed for patient to use if needed. There was no deceit detected, and the patient presented in a manner that was believable.     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  >> Colpo: Neg  2001 NIL   2003 NIL pap, Neg HPV  [id number], 2016  NIL pap  7/25/19 NIL pap, Neg HPV    Criteria necessary to stop screening per ASCCP guidelines:  Older than 65 years  Three consecutive negative cytology results or two consecutive negative cotest results within the previous 10 years, with the most recent being performed within the last 5 years.   (Women with hx of MADDIE 2 or 3, or adenocarcinoma in situ should continue screening for full 20 years, even if this goes past age 65).         Hyperlipidemia LDL goal <130 11/30/2018     Priority: Medium     Basal cell carcinoma      Priority: Medium     Colon polyps 05/01/2018     Priority: Medium     Primary localized osteoarthrosis of left lower leg 02/19/2018     Priority: Medium     Other secondary osteoarthritis of left knee 01/29/2018     Priority: Medium     Chronic constipation 09/14/2017      Priority: Medium     Raynaud's phenomenon without gangrene 09/14/2017     Priority: Medium     Insomnia, unspecified type 07/25/2017     Priority: Medium     Generalized anxiety disorder 05/23/2017     Priority: Medium     Adjustment disorder with mixed anxiety and depressed mood- mild-moderate  at this time 05/11/2017     Priority: Medium     Family history of celiac disease 05/11/2017     Priority: Medium     Basal cell carcinoma of leg, right 05/01/2016     Priority: Medium     Sun-damaged skin 04/21/2016     Priority: Medium     Seasonal allergic rhinitis 04/21/2016     Priority: Medium     Dupuytren's disease of palm - right 4th digit flexor tendon 06/11/2015     Priority: Medium     History of migraine headaches- assoc. with nausea/vomiting - resolved around menopause - were  premenstrual  01/08/2014     Priority: Medium     Rosacea 05/01/2013     Priority: Medium     Numbness of feet- distal feet R>L  02/21/2011     Priority: Medium     NUMBNESS AND TINGLING OF FOOT - bilateral -mild  08/25/2010     Priority: Medium     Osteopenia 01/02/2009     Priority: Medium     Lipidosis 10/24/2006     Priority: Medium     Problem list name updated by automated process. Provider to review       Localized osteoarthritis of hand 11/01/2005     Priority: Medium     Problem list name updated by automated process. Provider to review       Stress incontinence, female - mild 10/23/2012     Priority: Low     Cough 10/23/2012     Priority: Low     Acute bronchitis- recurrent- ? related to mold in school building- pt has since retired and no further bronchitis 10/23/2012     Priority: Low     Postmenopausal      Priority: Low     DERMATOPHYTOSIS OF NAIL- toenails  03/14/2006     Priority: Low     trichophyton rubrum on culture - 1/06        Synovial cyst of popliteal space      Priority: Low     Hidradenitis 12/05/2003     Priority: Low       Psychosocial & Contextual Factors: see HPI above    Assessment:  From Intake,  5/12/2022:  Jolynn Haji is a 66-year-old female with a history including anxiety, depression, insomnia status post concussion in April 2017.  No major mental health struggles prior to concussion in 2017.  Patient was started on Lexapro, quetiapine, mirtazapine to help with her symptoms.  Patient had been feeling much better on that medication combination and once she was sleeping well again quetiapine and mirtazapine were discontinued.  This past January the patient started to have some worsening symptoms and Abilify was added.  Patient has felt like her current medications of Lexapro and Abilify have been helpful but reports starting to feel really slowed and numb the past several weeks.  She also reported a weight loss of nearly 20 pounds over the last 6 weeks versus several months (patient was not very sure). I am wondering if she could be experiencing side effects of her increased Lexapro dose.  She is agreeable to decreasing her dose slightly to 15 mg daily.  Could also consider decreasing Abilify in the case it is not her Lexapro.  She also desires to sleep a little bit better than she has been.  Could consider restarting mirtazapine at bedtime for sleep and to also augment her Lexapro.  Could also be beneficial for patient to undergo cognitive screening to check in and see what her baseline is status post concussion.  No acute safety concerns.  No SI.  No problematic drug or alcohol use.    5/24/2022:  Patient with some slight improvements of possible medication related side effects after decreasing Lexapro.  I am wondering if there might be more robust improvements with decreasing Abilify/aripiprazole.  Possible drug-induced Parkinsonism?  She does seem to have delayed/slowed speech, masked facies.  Her gait and other movements unable to be observed but she does report other struggles that may be consistent with such a presentation.  We are going to decrease her Abilify down to 2 mg daily and I will see her  back in just a few weeks.  We will likely discontinue her Abilify at her next visit.  I will message her primary care provider to get some collateral regarding previous baseline functioning prior to starting Abilify.  History of TBI could be confounding presentation.  Could consider neurology consult.    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  Instructed to stop Abilify today.  We will monitor for improvement of symptoms.  If symptoms do not improve may need to refer to neurology.  If symptoms start to improve we will consider low-dose methylphenidate augmentation of Lexapro next visit to help with low energy, mood, focus and concentration, cognition, s/p TBI if still clinically indicated.  We would discuss risks and benefits with patient and her daughter at that time.  No other acute safety concerns or SI.  No problematic drug or alcohol use.  If patient starts to struggle with sleep would restart mirtazapine at bedtime.    7/28/2022:  Pt still with sxs consistent with drug-induced Parksinsonism.  Neurology consult will be placed so that patient might get scheduled sooner than later due to the typically long wait.  We will start Ambien in hopes we can get her sleeping much better.  Discussed risks and benefits of its use, particularly in the geriatric population.  Lexapro has not been as helpful as it helped for her anxiety.  We will taper this medication and start Effexor/venlafaxine in hopes that might be more effective for her symptoms.  No acute safety concerns.  No SI.  No problematic drug or alcohol use.    8/11/2022:  Overall with continued improved symptoms.  Hopefully patient will continue to have improvement of her symptoms off of Abilify.  Patient will be seeing neurology soon.  Encouraged to keep appointment.  We will continue taper off of Lexapro and continue on monotherapy with venlafaxine.  We will also continue Ambien at this time for sleep since patient is  sleeping much better on the medication with improved symptoms and tolerating well.  No acute safety concerns.  No problematic drug or alcohol use.    9/1/2022:  Ongoing anxiety, still intense at times.  Still not sleeping very well.  Could consider sleep evaluation, but patient quite overwhelmed right now with various referrals and doctors appointments.  Briseyda had been working quite well but not keeping her sleep now.  Still falling asleep relatively well.  We will transition to Ambien CR to see if that is more helpful.  We will continue to consider sleep evaluation to rule out possibility of REM behavior sleep disorder.  We will also transition venlafaxine to bedtime to see if that helps with any possible nausea.  Working with neurology to monitor for Parkinson's disease.  I do recommend neuropsychological testing due to ongoing possible cognitive difficulties/struggles.  No acute safety concerns.  No SI.  No problematic drug or alcohol use.    10/3/2022:  Patient overall with some ongoing anxiety but slowly improving since it got worse late August.  Recommend continuing to optimize Effexor-XR, especially since patient tolerating well.  No longer having any nausea now that dose is taken at bedtime.  Patient feeling a little foggy and off balance with Ambien CR and so we will switch back to Ambien immediate release.  No falls.  Recommend increasing Effexor-XR first and once well tolerated switching back to Ambien immediate release.  May need to continue to work on alternative options for sleep if does not do well back on Ambien immediate release.  No acute safety concerns.  No SI.  No problematic drug or alcohol use.  Encouraged to continue working with neurology.  Next appointment in November.    10/24/2022:  Patient overall with some improved anxiety on increased dose of Effexor-XR.  Could use additional improvement of anxiety and so we will continue to optimize therapy with Effexor-XR.  We will increase dose to  150 mg daily.  Tolerating well with no notable negative side effects.  Patient transitioned back to Ambien CR and is sleeping better on this compared to Ambien immediate release.  We will continue Ambien CR for sleep.  Discussed possibility of sleep medicine referral but patient declines at this time.  Could consider in the future.  No acute safety concerns.  No SI.  No problematic drug or alcohol use.  Encouraged to continue working with neurology.    12/09/2022:  Patient overall doing fairly well.  Feels like increased dose of venlafaxine has been helpful.  I do wonder if she is having some subtle withdrawal symptoms late in the afternoon with headache and fatigue.  Discussed shifting her dose to the morning.  However, she reports she will try to drink more water and see if that is helpful before trying to make changes to her medication.  She feels like things are going well enough that she really does not want to change anything.  Encouraged to continue in therapy.  No acute safety concerns.  No SI.  No problematic drug or alcohol use.    Medication side effects and alternatives were reviewed. Health promotion activities recommended and reviewed today. All questions addressed. Education and counseling completed regarding risks and benefits of medications and psychotherapy options. Recommend therapy for additional support.     Treatment Plan:    Continue Ambien CR 6.25 mg at bedtime as needed for sleep. Do not take with alcohol or opioid pain medication. Make sure to have 8-10 hours to devote to sleep after taking. Careful/caution for falls in middle of night. Discussed sleep medicine referral previously but patient has declined-could consider in future.     Continue Effexor-XR/venlafaxine  mg daily for depression and anxiety.     Continue ondansetron/Zofran 4 mg every 8 hrs as needed for nausea caused by Effexor-XR changes.    Continue to follow with neurology as they recommend.      Continue all other  cares per primary care provider.     Continue all other medications as reviewed per electronic medical record today.     Safety plan reviewed. To the Emergency Department as needed or call after hours crisis line at [phone] or [phone]. [place] Crisis Text Line. Text [place] to 843978 or Suicide LifeLine Chat: suicidepreventionBrainloopline.org/chat    Consider individual psychotherapy for additional support and ongoing development of nonpharmacologic coping skills and strategies.    Schedule an appointment with me in 6-8 weeks or sooner as needed. Call [facility] at [phone] to schedule.    Follow up with primary care provider as planned or for acute medical concerns.    Call the psychiatric nurse line with medication questions or concerns at [phone].    [employer] may be used to communicate with your provider, but this is not intended to be used for emergencies.    Administrative Billing:   Phone Call/Video Duration: 19 Minutes  Start: 10:39a  Stop: 10:58a    Patient Status:  Patient will continue to be seen for ongoing consultation and stabilization.    Signed:   Judy Pompa DO  [facility]    Disclaimer: This note consists of symbols derived from keyboarding, dictation and/or voice recognition software. As a result, there may be errors in the script that have gone undetected. Please consider this when interpreting information found in this chart.

## 2022-12-09 NOTE — Clinical Note
Please call this patient to get them scheduled for a follow-up visit in 6-8 weeks (on a Thursday please). Please schedule with me and the C, DINESH Whipple (Thursdays). Thanks!

## 2022-12-13 NOTE — PROGRESS NOTES
[facility] Integrated Behavioral Health  December 14, 2022      Behavioral Health Clinician Progress Note    Patient Name: Jolynn Haji           Service Type:  Individual      Service Location:   MyChart / Email (patient reached)     Session Start Time: 9:00am  Session End Time: 10:01am      Session Length: 53 - 60      Attendees: Patient     Service Modality:  Video Visit:      Provider verified identity through the following two step process.  Patient provided:  Patient is known previously to provider    Telemedicine Visit: The patient's condition can be safely assessed and treated via synchronous audio and visual telemedicine encounter.      Reason for Telemedicine Visit: Patient has requested telehealth visit    Originating Site (Patient Location): Patient's home    Distant Site (Provider Location): [facility]    Consent:  The patient/guardian has verbally consented to: the potential risks and benefits of telemedicine (video visit) versus in person care; bill my insurance or make self-payment for services provided; and responsibility for payment of non-covered services.     Patient would like the video invitation sent by:  My Chart    Mode of Communication:  Video Conference via [employer]    As the provider I attest to compliance with applicable laws and regulations related to telemedicine.    Visit Activities (Refresh list every visit): [facility] Only    Diagnostic Assessment Date: 9/27/22  Treatment Plan Review Date: 10/5/22  See Flowsheets for today's PHQ-9 and TIFFANY-7 results  Previous PHQ-9:   PHQ-9 SCORE 9/27/2022 9/29/2022 10/28/2022   PHQ-9 Total Score - - -   PHQ[facility]9 [facility] Score [facility] 6 (Mild depression) 7 (Mild depression) 5 (Mild depression)   PHQ-9 Total Score 6 7 5   Some encounter information is confidential and restricted. Go to Review Flowsheets activity to see all data.     Previous TIFFANY-7:   TIFFANY-7 SCORE 10/28/2022 12/6/2022 12/6/2022   Total  Score - - -   Total Score 7 (mild anxiety) - 3 (minimal anxiety)   Total Score 7 3 3       KARINA LEVEL:  KARINA Score (Last Two) 8/27/2010 2/22/2011   KARINA Raw Score 51 49   Activation Score 91.6 82.8   KARINA Level 4 4       DATA  Extended Session (60+ minutes): No  Interactive Complexity: No  Crisis: No  [facility] Patient: No    Treatment Objective(s) Addressed in This Session:  Target Behavior(s): anxiety and depression    Depressed Mood: Increase interest, engagement, and pleasure in doing things  Decrease frequency and intensity of feeling down, depressed, hopeless  Improve quantity and quality of night time sleep / decrease daytime naps  Feel less tired and more energy during the day   Improve diet, appetite, mindful eating, and / or meal planning  Identify negative self-talk and behaviors: challenge core beliefs, myths, and actions  Improve concentration, focus, and mindfulness in daily activities   Feel less fidgety, restless or slow in daily activities / interpersonal interactions  Anxiety: will experience a reduction in anxiety, will develop more effective coping skills to manage anxiety symptoms, will develop healthy cognitive patterns and beliefs and will increase ability to function adaptively    Current Stressors / Issues:  Met with pt this morning. Continues to endorse daily anxiety but is feeling more in control of her responses to it. Discussed appt with Dr. Pompa. Reports she has been she has been drinking more water to help with headaches. Has noted some improvement with headaches. Continues to have afternoon slump. She has been using the Calm lorena prior to her anxiety that comes around 4:30. Discussed racing heart rate when anxious. Normalized her experience and discussed nature of anxiety and panic attacks. Discussed an event when she noticed she was having increased anxiety baking cookies for the holidays. Explored internal dialog she has with herself in those moments. She was able to continue to move through  with baking despite the anxiety. Reports she was able to go to [employer] this past Sunday and enjoyed self. Discussed knowing her limits and pushing self into more anxious situations. Worked on inner dialog in those events. Discussed staying in the present as much as possibe. Recommended some reading on Anxiety and Panic to gain more understanding of what is happing to her when feeling more anxious.       12/7/22  Met with pt this morning, reports she is pretty good. She continues to work on breathing techs. She reports around 5pm is when she is needing to use it. Tends to be more anxious around 4:30-5:30pm. Then anxiety resolves and she can proceed with her evening. Reports she has been socializing with her friends for lunch and [place] shopping. Reports some anxiety about in socializing but has seen a great amount of improvement. She has been attending [employer] frequently however a headache has been interfering. She wants to discuss headaches and a slump time in the afternoon with Dr. Pompa on Friday. Discussed inflexibility and working on allowing for change in her plans. Discussed rigidness to her scheduling.      11/29/22  Met with pt this morning. Reports some anxiety with logging on to the appt. Reports Thanksgiving went really well despite making changes to the normal traditions. States that she also had family over for her husbands birthday and cooked dinner for everyone. She has not done this in many months and she stated it felt really good to do. Reports some anxiety around [place] shopping. As an alternative she has been ordering gifts online this year. Sleep continues to improve. States she sleep 7 hours last night. She continues to attend to self talk. Discussed check ins with self to assess level of anxiety.       11/15/22  Met with pt today for follow-up. She reports doing really well. She went out to dinner with family. She really enjoyed herself at dinner which is a difference from several months  ago. She is really enjoying spending time with her grand children. On Sunday she drove on her own from the first time in a very long time. [facility] validated and praised pts efforts. Reports still is having some general anxiety through out the day. She is looking forward to a sleep over with her julio. Discussed finding ways to access the comforts of meditation on the go. Discussed the up coming holidays and the work she is doing to avoid negative impact on her mental health. Reports having headaches consistently and an afternoon slump/tiredness.     11/9/22  Met with pt this morning. Report 6-7 hours of sleep which is an improvement from 3-4 hours. Reports the daily anxiety has greatly improved. States her anxiety is about half the amount that it was since last. She has been engaging socially. States she even has begun to attend [employer] again. She has been active in acknowledging the anxiety more objectively. She continues with her exercises. She has not had as much fog in the afternoon. Reinforced the need to continue practicing the tools she finds helpful from therapy. Normalized setbacks. Discussed SI from last session. Reports she has noticed them decreasing. Continues to feel the thoughts are highly unwanted and not matching how she feels. Denies plans or intentions.         10/31/22  Met with pt this morning. She states she is doing well today. Reports going to bed a bit later to possibly avoid such an early waking. Anxiety has been somewhat improved since last visit. Continues to have some physical sx such as chest tightening. Continues to have nervous feeling through the day. Continues to have anxiety when doing things socially. She has been writing a journal with a gratitude list. Exercising daily with walking the dog. Using the Calm lorena for meditation and imagery. She is working on acknowledging the anxiety and attempting to not  it. Unexpected events or plans tend to bring up a lot of anxiety.  "Discussed anxiety about planning for Thanksgiving. She is not feeling up to planning and doing the prep. She is having trouble with frustration with self. Discussed acceptance around the progress of her mental health and listening to family who are offering her a lot of support.     Pt brought up the reoccurring thoughts she has been having where she thinks to herself \"I wish I were dead.\" This thought comes automatically when she is feeling not up for doing something. States this is not a true feeling of hers but the thought continues to enter her mind. She is unnerved by this at times. She denies plan or intention. She is concerned it could be medication related as she noticed it start around the time of starting meds for anxiety. She describes these thoughts as fleeting and easily controlled. [facility] communicated this information with Dr. Pompa, pts psychiatrist. Dr. Pompa will touch base with pt via [employer].       10/14/22  Met with pt this morning who is reporting anxiety has improved a bit since last visit. States she has been active in body scanning, incorporating meditation back into her life and use of imagery. Discussed continued engagement in anxiety provoking situations. Discussed attempting to observe anxiety in a non- mental and objective manner. States people around her have noticed an improvement in her sx however she continues to feel \"like a wreck inside.\" Finally, discussed examining situations in which she is attempting to have control over. Worked on identifying areas in which she has no or little control. Discussed attempts to let go. Does report some issues with sleep on fast release Ambien. Pt has follow up appt with Dr. Pompa in just over a week. She has been taking long release Ambien which allows her to sleep longer throughout the night. Encouraged her to reach out to provider prior to appt if issues worsen.     10/5/22  Reports doing well today. She has been trying to remain social. She " tends to have an initial panic stricken feeling when committing to something with others. Since last med adjustment appears more calmer. Discussed areas in her life which appear to cause most of her anxiety. One area is around situations she has little control over, such as the result of her mental health. Discussed focus on acceptance of current state of mental health and focus on things she does have control over such as active steps she is taking to help her mental health. Discussed deep breathing exercise as a way of calming self with highly anxious. Introduced progressive muscle relaxation. Briefly discussed getting into a practice of meditation again. Finally, discussed finding frequent exercise as a part of anxiety. This is a bit more difficult as she has a bad knee which has been limiting for her. Pt is committed to trying out tech. Prior to next session.        9/27/22 DA  Met with pt for initial assessment. Reports several years ago she had a significant head injury which resulted in her inability to sleep. She reports anxiety followed shortly after. Since April of this year she has been experiencing more depressive sx. Her anxiety is mostly around wanting to feel better from her anxiety and not having positive results. Some of the depressive sx she has been having is poor appetite, lack of interest, struggle with focus, low energy, and has had on very rare occasions fleeting thoughts of wishing she were dead to end the pain of depression. Her anxiety sx often are in the form of nervousness, restlessness, and rumination. She used to have panic attacks each morning, however this has subsided for several months now. She has not attempted therapy before. She would like to gain skills relating to managing anxiety. She currently is working with Dr. Pompa for medications.     Progress on Treatment Objective(s) / Homework:  Minimal progress - PRECONTEMPLATION (Not seeing need for change); Intervened by educating  the patient about the effects of current behavior on health.  Evoked information about reasons to continue behavior, express concern / recommendations, and explored any change talk    Motivational Interviewing    MI Intervention: Expressed Empathy/Understanding, Supported Autonomy, Collaboration, Evocation, Open-ended questions, Change talk (evoked) and Reframe     Change Talk Expressed by the Patient: Desire to change Ability to change Reasons to change Need to change    Provider Response to Change Talk: E - Evoked more info from patient about behavior change, A - Affirmed patient's thoughts, decisions, or attempts at behavior change, R - Reflected patient's change talk and S - Summarized patient's change talk statements      Care Plan review completed: Yes    Medication Review:  No changes to current psychiatric medication(s)    Medication Compliance:  Yes    Changes in Health Issues:   None reported    Chemical Use Review:   Substance Use: Chemical use reviewed, no active concerns identified      Tobacco Use: No current tobacco use.      Assessment: Current Emotional / Mental Status (status of significant symptoms):  Risk status (Self / Other harm or suicidal ideation)  Patient denies a history of suicidal ideation, suicide attempts, self-injurious behavior, homicidal ideation, homicidal behavior and and other safety concerns  Patient denies current fears or concerns for personal safety.  Patient denies current or recent suicidal ideation or behaviors.  Patient denies current or recent homicidal ideation or behaviors.  Patient denies current or recent self injurious behavior or ideation.  Patient denies other safety concerns.  A safety and risk management plan has not been developed at this time, however patient was encouraged to call [facility] / [facility] should there be a change in any of these risk factors.    Appearance:   Appropriate   Eye Contact:   Good   Psychomotor Behavior: Normal    Attitude:   Cooperative   Orientation:   All  Speech   Rate / Production: Normal    Volume:  Normal   Mood:    Anxious  Depressed   Affect:    Appropriate   Thought Content:  Clear   Thought Form:  Coherent  Logical   Insight:    Good     Diagnoses:  1. Generalized anxiety disorder    2. Insomnia, unspecified type    3. Mood disorder (H)        Collateral Reports Completed:  Not Applicable    Plan: (Homework, other):  Patient was given information about behavioral services and encouraged to schedule a follow up appointment with the clinic [facility] in 1 week.  She was also given information about mental health symptoms and treatment options .  CD Recommendations: No indications of CD issues.  Erika Francisco MSW LICSW      ______________________________________________________________________    Integrated Primary Care Behavioral Health Treatment Plan    Patient's Name: Jolynn Haji  YOB: 1955    Date of Creation: 10/5/22  Date Treatment Plan Last Reviewed/Revised: 10/5/22    DSM5 Diagnoses: 300.02 (F41.1) Generalized Anxiety Disorder  Psychosocial / Contextual Factors: Individual Factors high anxiety associated with depressive sx, interfereing with ability to function as she would like.  .  PROMIS (reviewed every 90 days):     Referral / Collaboration:  Was/were discussed and patient will pursue. - individual therapy    Anticipated number of session for this episode of care: 5-8 sessions  Anticipation frequency of session: Weekly  Anticipated Duration of each session: 38-52 minutes  Treatment plan will be reviewed in 90 days or when goals have been changed.       MeasurableTreatment Goal(s) related to diagnosis / functional impairment(s)  Goal 1: Patient will work with provider to manage symptoms    I will know I've met my goal when when less anxious and feeling down.      Objective #A (Patient Action)    Patient will  attend all sessions.  Status: New - Date: 10/5/22     Intervention(s)  Therapist will teach  educate patient on treatment options, clarify concerns, work with pt to overcome any resistance to compliance..    Objective #B  Patient will identify 3 fears / thoughts that contribute to feeling anxious.  Status: New - Date: 10/5/22     Intervention(s)  Therapist will educate patient on treatment options, clarify concerns, work with pt to overcome any resistance to compliance..    Objective #C  Patient will identify 3 initial signs or symptoms of anxiety.  Status: New - Date: 10/5/22     Intervention(s)  Therapist will educate patient on treatment options, clarify concerns, work with pt to overcome any resistance to compliance..        Patient has reviewed and agreed to the above plan.      JOESPH Whipple  December 14, 2022  Answers for HPI/ROS submitted by the patient on 12/6/2022  TIFFANY 7 TOTAL SCORE: 3

## 2022-12-14 ENCOUNTER — VIRTUAL VISIT (OUTPATIENT)
Dept: BEHAVIORAL HEALTH | Facility: CLINIC | Age: 67
End: 2022-12-14
Payer: COMMERCIAL

## 2022-12-14 DIAGNOSIS — G47.00 INSOMNIA, UNSPECIFIED TYPE: ICD-10-CM

## 2022-12-14 DIAGNOSIS — F39 MOOD DISORDER (H): ICD-10-CM

## 2022-12-14 DIAGNOSIS — F41.1 GENERALIZED ANXIETY DISORDER: Primary | ICD-10-CM

## 2022-12-14 PROCEDURE — 90837 PSYTX W PT 60 MINUTES: CPT | Mod: 95 | Performed by: SOCIAL WORKER

## 2022-12-14 NOTE — PATIENT INSTRUCTIONS
Treatment Plan:  Continue Ambien CR 6.25 mg at bedtime as needed for sleep. Do not take with alcohol or opioid pain medication. Make sure to have 8-10 hours to devote to sleep after taking. Careful/caution for falls in middle of night. Discussed sleep medicine referral previously but patient has declined-could consider in future.   Continue Effexor-XR/venlafaxine  mg daily for depression and anxiety.   Continue ondansetron/Zofran 4 mg every 8 hrs as needed for nausea caused by Effexor-XR changes.  Continue to follow with neurology as they recommend.    Continue all other cares per primary care provider.   Continue all other medications as reviewed per electronic medical record today.   Safety plan reviewed. To the Emergency Department as needed or call after hours crisis line at [phone] or [phone]. [place] Crisis Text Line. Text [place] to 199807 or Suicide LifeLine Chat: suicidepreventionlifeline.org/chat  Consider individual psychotherapy for additional support and ongoing development of nonpharmacologic coping skills and strategies.  Schedule an appointment with me in 6-8 weeks or sooner as needed. Call [facility] at [phone] to schedule.  Follow up with primary care provider as planned or for acute medical concerns.  Call the psychiatric nurse line with medication questions or concerns at [phone].  MyChart may be used to communicate with your provider, but this is not intended to be used for emergencies.

## 2022-12-15 ASSESSMENT — PATIENT HEALTH QUESTIONNAIRE - PHQ9
SUM OF ALL RESPONSES TO PHQ QUESTIONS 1-9: 4
SUM OF ALL RESPONSES TO PHQ QUESTIONS 1-9: 4

## 2022-12-16 ENCOUNTER — OFFICE VISIT (OUTPATIENT)
Dept: FAMILY MEDICINE | Facility: CLINIC | Age: 67
End: 2022-12-16
Payer: COMMERCIAL

## 2022-12-16 VITALS
RESPIRATION RATE: 16 BRPM | WEIGHT: 130.2 LBS | HEIGHT: 68 IN | BODY MASS INDEX: 19.73 KG/M2 | SYSTOLIC BLOOD PRESSURE: 130 MMHG | HEART RATE: 86 BPM | DIASTOLIC BLOOD PRESSURE: 76 MMHG | TEMPERATURE: 97.8 F

## 2022-12-16 DIAGNOSIS — F43.23 ADJUSTMENT DISORDER WITH MIXED ANXIETY AND DEPRESSED MOOD: Primary | ICD-10-CM

## 2022-12-16 DIAGNOSIS — F39 MOOD DISORDER (H): ICD-10-CM

## 2022-12-16 DIAGNOSIS — G21.19 DRUG-INDUCED PARKINSONISM (H): ICD-10-CM

## 2022-12-16 PROCEDURE — 99214 OFFICE O/P EST MOD 30 MIN: CPT | Performed by: FAMILY MEDICINE

## 2022-12-16 ASSESSMENT — PATIENT HEALTH QUESTIONNAIRE - PHQ9: SUM OF ALL RESPONSES TO PHQ QUESTIONS 1-9: 4

## 2022-12-16 NOTE — PROGRESS NOTES
Assessment & Plan       ICD-10-CM    1. Adjustment disorder with mixed anxiety and depressed mood- mild-moderate  at this time- much improved today - pt looking forward to the holidays   F43.23       2. Drug-induced parkinsonism (H)  G21.19       3. Mood disorder (H)  F39            Future Appointments 12/16/2022 - 6/14/2023      Date Visit Type Length Department Provider     12/20/2022 11:00 AM [facility] RETURN 60 min  BEHAVIORAL HEALTH Erika Francisco LICSW    Location Instructions:     Your appointment is at [facility] located in the [facility] at [address] [place] [address]. Please check in at the  in [address].              1/9/2023  3:00 PM ADULT PSYCHOTHERAPY NEW 60 min [facility] [place] [place] PSYCHOLOGY Paula Navarrete, PhD    Location Instructions:     Located in the [facility] at [address] [address]. For parking options, enter the [facility] /arrival plaza from [facility] and attendants can assist you based on your needs.  parking is available for those with limited mobility M-F from 7 a.m. to 5 p.m. Due to short staffing, we are unable to offer  to all patients/visitors. Visit [email or web address] for more details.  Self-parking:&nbsp;  West Lot: Located across from the main entrance, this is a convenient option for patients. Enter on [facility]. Parking attendants available most hours to assist.&nbsp;     [facility] Ramp: Enter at the [facility] SE entrance (one block north of the [facility] main entrance). Do not enter the ramp from [facility] - this entrance is not staffed and is further from the [facility] main entrance.              1/19/2023 10:[facility]URN 30 min  BEHAVIORAL HEALTH Erika Francisco LICSW    Location Instructions:     Your appointment is at [facility] located in the [facility] at [address] [place] [address]. Please check in at the  in [address].               1/19/2023 11:00 AM [facility] ADULT PSYCHIATRY RETURN 30 min [facility] Judy Pompa DO    Location Instructions:     [facility] has 2 buildings on its campus. Please visit us at [address], [place] and enter the building with the numbers [phone] on it.               2/2/2023  2:00 PM ANNUAL WELLNESS 40 min  FAMILY PRACTICE Helga Ibarra MD    Location Instructions:     [facility] is located at [address] [facility] [address], [address] Free parking is available; access the lot by turning north from [address] onto [facility], then west onto [facility].              3/17/2023 12:30 PM [facility] NEUROPSYCH EVAL 240 min [facility] NEUROPSYCHOLOGY Kassie Ashton, PhD    Location Instructions:     Located in the [facility] at [address] [facility] [address]. For parking options, enter the [facility] /arrival plaza from [facility] and attendants can assist you based on your needs.  parking is available for those with limited mobility M-F from 7 a.m. to 5 p.m. Due to short staffing, we are unable to offer  to all patients/visitors. Visit [email or web address] [place] [facility] for more details.  Self-parking:&nbsp;  West Lot: Located across from the main entrance, this is a convenient option for patients. Enter on [facility]. Parking attendants available most hours to assist.&nbsp;     [facility] Ramp: Enter at the [facility] SE entrance (one block north of the [facility] main entrance). Do not enter the ramp from [facility] - this entrance is not staffed and is further from the [facility] main entrance.              5/16/2023 10:30 AM RETURN 15 min [facility] Ligia Carbajal PA-C    Location Instructions:     [address] [place] [id number]                    Ordering of each unique test  Prescription drug management  28 minutes spent on the date of the encounter doing chart review, history and exam, documentation  and further activities per the note       MEDICATIONS:  Continue current medications without change  Regular exercise  See Patient Instructions    Return in 4 weeks (on 1/13/2023) for depression/anxiety follow up, w/ Dr. SUE for 40 minute appointment. per pt request.      Helga Ibarra MD  [facility]    Subjective :   Azalea is a 67 year old, presenting for the following health issues:  Recheck Medication  seeing her therapist Erika virtually once a week and feels she's a great fit.    Had a Saturday with her grandchildren ages 7 and almost 4 and did artwork all afternoon - did different projects all day.      History of Present Illness       Mental Health Follow-up:  Patient presents to follow-up on Depression & Anxiety.Patient's depression since last visit has been:  Good  The patient is not having other symptoms associated with depression.  Patient's anxiety since last visit has been:  Medium  The patient is having other symptoms associated with anxiety.  Any significant life events: No  Patient is feeling anxious or having panic attacks.  Patient has no concerns about alcohol or drug use.    She eats 2-3 servings of fruits and vegetables daily.She consumes 0 sweetened beverage(s) daily.She exercises with enough effort to increase her heart rate 10 to 19 minutes per day.    She is taking medications regularly.    Today's PHQ-9         PHQ-9 Total Score: 4    PHQ-9 Q9 Thoughts of better off dead/self-harm past 2 weeks :   Not at all       feels like concentration is improving and Dieudonne plans are coming together.    She 's given herself some jerry   Last PHQ-9 12/15/2022   1.  Little interest or pleasure in doing things 0   2.  Feeling down, depressed, or hopeless 0   3.  Trouble falling or staying asleep, or sleeping too much 3   4.  Feeling tired or having little energy 1   5.  Poor appetite or overeating 0   6.  Feeling bad about yourself 0   7.  Trouble concentrating 0   8.   Moving slowly or restless 0   Q9: Thoughts of better off dead/self-harm past 2 weeks 0   PHQ-9 Total Score 4   Difficulty at work, home, or with people -     TIFFANY-7  12/6/2022   1. Feeling nervous, anxious, or on edge 1   2. Not being able to stop or control worrying 1   3. Worrying too much about different things 1   4. Trouble relaxing 0   5. Being so restless that it is hard to sit still 0   6. Becoming easily annoyed or irritable 0   7. Feeling afraid, as if something awful might happen 0   TIFFANY-7 Total Score 3   If you checked any problems, how difficult have they made it for you to do your work, take care of things at home, or get along with other people? Somewhat difficult     Patient Active Problem List   Diagnosis     Essential hypertension with goal blood pressure less than 140/90     Hidradenitis     Localized osteoarthritis of hand     Synovial cyst of popliteal space     DERMATOPHYTOSIS OF NAIL- toenails      Lipidosis     Osteopenia     Postmenopausal     NUMBNESS AND TINGLING OF FOOT - bilateral -mild      Numbness of feet- distal feet R>L      Stress incontinence, female - mild     Cough     Acute bronchitis- recurrent- ? related to mold in school building- pt has since retired and no further bronchitis     Rosacea     History of migraine headaches- assoc. with nausea/vomiting - resolved around menopause - were  premenstrual      Dupuytren's disease of palm - right 4th digit flexor tendon     Sun-damaged skin     Seasonal allergic rhinitis     Basal cell carcinoma of leg, right     Adjustment disorder with mixed anxiety and depressed mood- mild-moderate  at this time     Family history of celiac disease     Generalized anxiety disorder     Insomnia, unspecified type     Chronic constipation     Raynaud's phenomenon without gangrene     Other secondary osteoarthritis of left knee     Primary localized osteoarthrosis of left lower leg     Hyperlipidemia LDL goal <130     Basal cell carcinoma     Colon  "polyps     Screening for cervical cancer     Adjustment insomnia     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        Current Outpatient Medications   Medication Sig Dispense Refill     clindamycin (CLINDAGEL) 1 % external gel Apply topically daily 60 g 11     lactobacillus rhamnosus, GG, (CULTURELL) capsule Take 1 capsule by mouth daily       lisinopril (ZESTRIL) 2.5 MG tablet Take 1 tablet (2.5 mg) by mouth daily 90 tablet 3     ondansetron (ZOFRAN ODT) 4 MG ODT tab Take 1 tablet (4 mg) by mouth every 8 hours as needed for nausea 30 tablet 0     venlafaxine (EFFEXOR XR) 150 MG 24 hr capsule Take 1 capsule (150 mg) by mouth daily 90 capsule 0     zolpidem ER (AMBIEN CR) 6.25 MG CR tablet Take 1 tablet (6.25 mg) by mouth nightly as needed for sleep 90 tablet 0          Allergies   Allergen Reactions     Abilify [Aripiprazole] Other (See Comments)     Suspected drug-induced Parkinsonism      Fosamax Diarrhea     Diarrhea, stomach cramps, jaw pain      Amoxicillin-Pot Clavulanate Itching and Rash     Augmentin Itching and Rash         Review of Systems   Constitutional, HEENT, cardiovascular, pulmonary, GI, , musculoskeletal, neuro, skin, endocrine and psych systems are negative, except as otherwise noted.      Objective    BP (!) 142/80   Pulse 86   Temp 97.8  F (36.6  C) (Tympanic)   Resp 16   Ht 1.715 m (5' 7.5\")   Wt 59.1 kg (130 lb 3.2 oz)   LMP 06/28/2001   BMI 20.09 kg/m    There is no height or weight on file to calculate BMI.  Physical Exam   GENERAL: healthy, alert and no distress  EYES: Eyes grossly normal to inspection, PERRL and conjunctivae and sclerae normal  HENT: normal cephalic/atraumatic, nose and mouth without ulcers or lesions and oral mucous membranes moist  NECK: no adenopathy, no asymmetry, masses, or scars and thyroid normal to palpation  RESP: lungs clear to auscultation - no rales, rhonchi or wheezes  CV: regular rate and " rhythm, normal S1 S2, no S3 or S4, no murmur, click or rub, no peripheral edema and peripheral pulses strong  MS: no gross musculoskeletal defects noted, no edema  SKIN: no suspicious lesions or rashes  NEURO: Normal strength and tone, mentation intact and speech normal  PSYCH: mentation appears normal, affect normal/bright    Office Visit on 08/05/2022   Component Date Value Ref Range Status     Case Report 08/05/2022    Final                    Value:Surgical Pathology Report                         Case: [id number]                                  Authorizing Provider:  Ligia Carbajal PA-C    Collected:           08/05/2022 10:44 AM          Ordering Location:     [facility]   Received:            08/05/2022 04:17 PM                                 [facility]                                                           Pathologist:           Rodrigo Taylor MD                                                      Specimens:   A) - Skin, left forearm                                                                             B) - Skin, right lower leg                                                                  Final Diagnosis 08/05/2022    Final                    Value:This result contains rich text formatting which cannot be displayed here.     Clinical Information 08/05/2022    Final                    Value:This result contains rich text formatting which cannot be displayed here.     Gross Description 08/05/2022    Final                    Value:This result contains rich text formatting which cannot be displayed here.     Microscopic Description 08/05/2022    Final                    Value:This result contains rich text formatting which cannot be displayed here.     Performing Labs 08/05/2022    Final                    Value:This result contains rich text formatting which cannot be displayed here.

## 2022-12-19 NOTE — PROGRESS NOTES
[facility]  December 20, 2022      Behavioral Health Clinician Progress Note    Patient Name: Jolynn Haji           Service Type:  Individual      Service Location:   MyChart / Email (patient reached)     Session Start Time: 10:58 am  Session End Time: 11:52 am      Session Length: 53 - 60      Attendees: Patient     Service Modality:  Video Visit:      Provider verified identity through the following two step process.  Patient provided:  Patient is known previously to provider    Telemedicine Visit: The patient's condition can be safely assessed and treated via synchronous audio and visual telemedicine encounter.      Reason for Telemedicine Visit: Patient has requested telehealth visit    Originating Site (Patient Location): Patient's home    Distant Site (Provider Location): [facility]    Consent:  The patient/guardian has verbally consented to: the potential risks and benefits of telemedicine (video visit) versus in person care; bill my insurance or make self-payment for services provided; and responsibility for payment of non-covered services.     Patient would like the video invitation sent by:  My Chart    Mode of Communication:  Video Conference via [employer]    As the provider I attest to compliance with applicable laws and regulations related to telemedicine.    Visit Activities (Refresh list every visit): [facility] Only    Diagnostic Assessment Date: 9/27/22  Treatment Plan Review Date: 1/5/23  See Flowsheets for today's PHQ-9 and TIFFANY-7 results  Previous PHQ-9:   PHQ-9 SCORE 9/29/2022 10/28/2022 12/15/2022   PHQ-9 Total Score - - -   PHQ-9 Total Score [facility] 7 (Mild depression) 5 (Mild depression) 4 (Minimal depression)   PHQ-9 Total Score 7 5 4   Some encounter information is confidential and restricted. Go to Review Flowsheets activity to see all data.     Previous TIFFANY-7:   TIFFANY-7 SCORE 10/28/2022 12/6/2022 12/6/2022  "  Total Score - - -   Total Score 7 (mild anxiety) - 3 (minimal anxiety)   Total Score 7 3 3       KARINA LEVEL:  KARINA Score (Last Two) 8/27/2010 2/22/2011   KARINA Raw Score 51 49   Activation Score 91.6 82.8   KARINA Level 4 4       DATA  Extended Session (60+ minutes): No  Interactive Complexity: No  Crisis: No  [facility] Patient: No    Treatment Objective(s) Addressed in This Session:  Target Behavior(s): anxiety and depression    Depressed Mood: Increase interest, engagement, and pleasure in doing things  Decrease frequency and intensity of feeling down, depressed, hopeless  Improve quantity and quality of night time sleep / decrease daytime naps  Feel less tired and more energy during the day   Improve diet, appetite, mindful eating, and / or meal planning  Identify negative self-talk and behaviors: challenge core beliefs, myths, and actions  Improve concentration, focus, and mindfulness in daily activities   Feel less fidgety, restless or slow in daily activities / interpersonal interactions  Anxiety: will experience a reduction in anxiety, will develop more effective coping skills to manage anxiety symptoms, will develop healthy cognitive patterns and beliefs and will increase ability to function adaptively    Current Stressors / Issues:  Says that she is doing pretty good with [place] right around the corner. She has been proactive in her planning to decrease stress and anxiety. Reports consistent 6.5 hours of sleep on average. She would like to ideally get 8-9 hours of sleep a night. Discussed talking with Dr. Pompa again about the benefits of a sleep study at her next appt. Pt continues to utilize her breathing and relaxation tech. Reports she feels she is \"maintaining\" in regards to progress with anxiety which is her goal for the holiday season. Discussed the importance of remaining active, socially engaged, and exploring anxiety provoking activities. She is reading a book recommended by [facility] and has been open to " concepts the book has been offered.     12/14/22  Met with pt this morning. Continues to endorse daily anxiety but is feeling more in control of her responses to it. Discussed appt with Dr. Pompa. Reports she has been she has been drinking more water to help with headaches. Has noted some improvement with headaches. Continues to have afternoon slump. She has been using the Calm lorena prior to her anxiety that comes around 4:30. Discussed racing heart rate when anxious. Normalized her experience and discussed nature of anxiety and panic attacks. Discussed an event when she noticed she was having increased anxiety baking cookies for the holidays. Explored internal dialog she has with herself in those moments. She was able to continue to move through with baking despite the anxiety. Reports she was able to go to [employer]ist this past Sunday and enjoyed self. Discussed knowing her limits and pushing self into more anxious situations. Worked on inner dialog in those events. Discussed staying in the present as much as possibe. Recommended some reading on Anxiety and Panic to gain more understanding of what is happing to her when feeling more anxious.       12/7/22  Met with pt this morning, reports she is pretty good. She continues to work on breathing techs. She reports around 5pm is when she is needing to use it. Tends to be more anxious around 4:30-5:30pm. Then anxiety resolves and she can proceed with her evening. Reports she has been socializing with her friends for lunch and [place] shopping. Reports some anxiety about in socializing but has seen a great amount of improvement. She has been attending Taoist frequently however a headache has been interfering. She wants to discuss headaches and a slump time in the afternoon with Dr. Pompa on Friday. Discussed inflexibility and working on allowing for change in her plans. Discussed rigidness to her scheduling.      11/29/22  Met with pt this morning. Reports some anxiety  with logging on to the appt. Reports Thanksgiving went really well despite making changes to the normal traditions. States that she also had family over for her husbands birthday and cooked dinner for everyone. She has not done this in many months and she stated it felt really good to do. Reports some anxiety around [place] shopping. As an alternative she has been ordering gifts online this year. Sleep continues to improve. States she sleep 7 hours last night. She continues to attend to self talk. Discussed check ins with self to assess level of anxiety.       11/15/22  Met with pt today for follow-up. She reports doing really well. She went out to dinner with family. She really enjoyed herself at dinner which is a difference from several months ago. She is really enjoying spending time with her grand children. On Sunday she drove on her own from the first time in a very long time. [facility] validated and praised pts efforts. Reports still is having some general anxiety through out the day. She is looking forward to a sleep over with her gannany. Discussed finding ways to access the comforts of meditation on the go. Discussed the up coming holidays and the work she is doing to avoid negative impact on her mental health. Reports having headaches consistently and an afternoon slump/tiredness.     11/9/22  Met with pt this morning. Report 6-7 hours of sleep which is an improvement from 3-4 hours. Reports the daily anxiety has greatly improved. States her anxiety is about half the amount that it was since last. She has been engaging socially. States she even has begun to attend Hinduism again. She has been active in acknowledging the anxiety more objectively. She continues with her exercises. She has not had as much fog in the afternoon. Reinforced the need to continue practicing the tools she finds helpful from therapy. Normalized setbacks. Discussed SI from last session. Reports she has noticed them decreasing.  "Continues to feel the thoughts are highly unwanted and not matching how she feels. Denies plans or intentions.         10/31/22  Met with pt this morning. She states she is doing well today. Reports going to bed a bit later to possibly avoid such an early waking. Anxiety has been somewhat improved since last visit. Continues to have some physical sx such as chest tightening. Continues to have nervous feeling through the day. Continues to have anxiety when doing things socially. She has been writing a journal with a gratitude list. Exercising daily with walking the dog. Using the Calm lorena for meditation and imagery. She is working on acknowledging the anxiety and attempting to not  it. Unexpected events or plans tend to bring up a lot of anxiety. Discussed anxiety about planning for Thanksgiving. She is not feeling up to planning and doing the prep. She is having trouble with frustration with self. Discussed acceptance around the progress of her mental health and listening to family who are offering her a lot of support.     Pt brought up the reoccurring thoughts she has been having where she thinks to herself \"I wish I were dead.\" This thought comes automatically when she is feeling not up for doing something. States this is not a true feeling of hers but the thought continues to enter her mind. She is unnerved by this at times. She denies plan or intention. She is concerned it could be medication related as she noticed it start around the time of starting meds for anxiety. She describes these thoughts as fleeting and easily controlled. [facility] communicated this information with Dr. Pompa, pts psychiatrist. Dr. Pompa will touch base with pt via [employer].       10/14/22  Met with pt this morning who is reporting anxiety has improved a bit since last visit. States she has been active in body scanning, incorporating meditation back into her life and use of imagery. Discussed continued engagement in anxiety provoking " "situations. Discussed attempting to observe anxiety in a non- mental and objective manner. States people around her have noticed an improvement in her sx however she continues to feel \"like a wreck inside.\" Finally, discussed examining situations in which she is attempting to have control over. Worked on identifying areas in which she has no or little control. Discussed attempts to let go. Does report some issues with sleep on fast release Ambien. Pt has follow up appt with Dr. Pompa in just over a week. She has been taking long release Ambien which allows her to sleep longer throughout the night. Encouraged her to reach out to provider prior to appt if issues worsen.     10/5/22  Reports doing well today. She has been trying to remain social. She tends to have an initial panic stricken feeling when committing to something with others. Since last med adjustment appears more calmer. Discussed areas in her life which appear to cause most of her anxiety. One area is around situations she has little control over, such as the result of her mental health. Discussed focus on acceptance of current state of mental health and focus on things she does have control over such as active steps she is taking to help her mental health. Discussed deep breathing exercise as a way of calming self with highly anxious. Introduced progressive muscle relaxation. Briefly discussed getting into a practice of meditation again. Finally, discussed finding frequent exercise as a part of anxiety. This is a bit more difficult as she has a bad knee which has been limiting for her. Pt is committed to trying out tech. Prior to next session.        9/27/22 DA  Met with pt for initial assessment. Reports several years ago she had a significant head injury which resulted in her inability to sleep. She reports anxiety followed shortly after. Since April of this year she has been experiencing more depressive sx. Her anxiety is mostly around wanting " to feel better from her anxiety and not having positive results. Some of the depressive sx she has been having is poor appetite, lack of interest, struggle with focus, low energy, and has had on very rare occasions fleeting thoughts of wishing she were dead to end the pain of depression. Her anxiety sx often are in the form of nervousness, restlessness, and rumination. She used to have panic attacks each morning, however this has subsided for several months now. She has not attempted therapy before. She would like to gain skills relating to managing anxiety. She currently is working with Dr. Pompa for medications.     Progress on Treatment Objective(s) / Homework:  Satisfactory progress - ACTION (Actively working towards change); Intervened by reinforcing change plan / affirming steps taken    Motivational Interviewing    MI Intervention: Expressed Empathy/Understanding, Supported Autonomy, Collaboration, Evocation, Open-ended questions, Change talk (evoked) and Reframe     Change Talk Expressed by the Patient: Desire to change Ability to change Reasons to change Need to change    Provider Response to Change Talk: E - Evoked more info from patient about behavior change, A - Affirmed patient's thoughts, decisions, or attempts at behavior change, R - Reflected patient's change talk and S - Summarized patient's change talk statements      Care Plan review completed: Yes    Medication Review:  No changes to current psychiatric medication(s)    Medication Compliance:  Yes    Changes in Health Issues:   None reported    Chemical Use Review:   Substance Use: Chemical use reviewed, no active concerns identified      Tobacco Use: No current tobacco use.      Assessment: Current Emotional / Mental Status (status of significant symptoms):  Risk status (Self / Other harm or suicidal ideation)  Patient denies a history of suicidal ideation, suicide attempts, self-injurious behavior, homicidal ideation, homicidal behavior and and  other safety concerns  Patient denies current fears or concerns for personal safety.  Patient denies current or recent suicidal ideation or behaviors.  Patient denies current or recent homicidal ideation or behaviors.  Patient denies current or recent self injurious behavior or ideation.  Patient denies other safety concerns.  A safety and risk management plan has not been developed at this time, however patient was encouraged to call [place] [phone] should there be a change in any of these risk factors.    Appearance:   Appropriate   Eye Contact:   Good   Psychomotor Behavior: Normal   Attitude:   Cooperative   Orientation:   All  Speech   Rate / Production: Normal    Volume:  Normal   Mood:    Anxious   Affect:    Appropriate   Thought Content:  Clear   Thought Form:  Coherent  Logical   Insight:    Good     Diagnoses:  1. Generalized anxiety disorder    2. Insomnia, unspecified type    3. Mood disorder (H)        Collateral Reports Completed:  Not Applicable    Plan: (Homework, other):  Patient was given information about behavioral services and encouraged to schedule a follow up appointment with the clinic [facility] in 1 week.  She was also given information about mental health symptoms and treatment options .  CD Recommendations: No indications of CD issues.  Erika Francisco [facility]      ______________________________________________________________________    Integrated Primary Care Behavioral Health Treatment Plan    Patient's Name: Jolynn Haji  YOB: 1955    Date of Creation: 10/5/22  Date Treatment Plan Last Reviewed/Revised: 10/5/22    DSM5 Diagnoses: 300.02 (F41.1) Generalized Anxiety Disorder  Psychosocial / Contextual Factors: Individual Factors high anxiety associated with depressive sx, interfereing with ability to function as she would like.  .  PROMIS (reviewed every 90 days):     Referral / Collaboration:  Was/were discussed and patient will pursue. - individual therapy    Anticipated  number of session for this episode of care: 5-8 sessions  Anticipation frequency of session: Weekly  Anticipated Duration of each session: 38-52 minutes  Treatment plan will be reviewed in 90 days or when goals have been changed.       MeasurableTreatment Goal(s) related to diagnosis / functional impairment(s)  Goal 1: Patient will work with provider to manage symptoms    I will know I've met my goal when when less anxious and feeling down.      Objective #A (Patient Action)    Patient will  attend all sessions.  Status: New - Date: 10/5/22     Intervention(s)  Therapist will teach educate patient on treatment options, clarify concerns, work with pt to overcome any resistance to compliance..    Objective #B  Patient will identify 3 fears / thoughts that contribute to feeling anxious.  Status: New - Date: 10/5/22     Intervention(s)  Therapist will educate patient on treatment options, clarify concerns, work with pt to overcome any resistance to compliance..    Objective #C  Patient will identify 3 initial signs or symptoms of anxiety.  Status: New - Date: 10/5/22     Intervention(s)  Therapist will educate patient on treatment options, clarify concerns, work with pt to overcome any resistance to compliance..        Patient has reviewed and agreed to the above plan.      DINESH Whipple  December 20, 2022  Answers for HPI/ROS submitted by the patient on 12/6/2022  TIFFANY 7 TOTAL SCORE: 3

## 2022-12-20 ENCOUNTER — VIRTUAL VISIT (OUTPATIENT)
Dept: BEHAVIORAL HEALTH | Facility: CLINIC | Age: 67
End: 2022-12-20
Payer: COMMERCIAL

## 2022-12-20 DIAGNOSIS — F41.1 GENERALIZED ANXIETY DISORDER: Primary | ICD-10-CM

## 2022-12-20 DIAGNOSIS — F39 MOOD DISORDER (H): ICD-10-CM

## 2022-12-20 DIAGNOSIS — G47.00 INSOMNIA, UNSPECIFIED TYPE: ICD-10-CM

## 2022-12-20 PROCEDURE — 90837 PSYTX W PT 60 MINUTES: CPT | Mod: 95 | Performed by: SOCIAL WORKER

## 2022-12-26 ENCOUNTER — TELEPHONE (OUTPATIENT)
Dept: FAMILY MEDICINE | Facility: CLINIC | Age: 67
End: 2022-12-26

## 2022-12-26 NOTE — TELEPHONE ENCOUNTER
COVID Positive/Requesting COVID treatment    Patient is positive for COVID and requesting treatment options.    Date of positive COVID test :  12/26 12:20pm  Home                                   Current COVID symptoms: fever or chills  Date COVID symptoms began: N/A    Message should be routed to clinic RN pool. Best phone number to use for call back:   [phone]

## 2022-12-26 NOTE — TELEPHONE ENCOUNTER
RN COVID TREATMENT VISIT  12/26/22    Jolynn Haji  67 year old  Current weight? 130lbs    Has the patient been seen by a primary care provider at an [facility] or [facility] within the past two years? Yes.   Have you been in close proximity to/do you have a known exposure to a person with a confirmed case of influenza? No.     Date of positive COVID test (PCR or at home)?  12/26/2022    Current COVID symptoms: cough, fatigue, sore throat and congestion or runny nose    Date COVID symptoms began: 12/25/2022    Do you have any of the following conditions that place you at risk of being very sick from COVID-19? 65 years or older and mental health conditions    Is patient eligible to continue? Yes, established patient, 12 years or older weighing at least 88.2 lbs, who has COVID symptoms that started in the past 5 days and is at risk for being very sick from COVID-19.       Have you received monoclonal antibodies or oral antiviral medications since testing positive to COVID-19? No    Are you currently hospitalized for COVID-19? No    Do you have a history of hepatitis? No    Are you currently pregnant or nursing? No    Do you have a clinically significant hypersensitivity to nirmatrelvir, ritonavir, or molnupiravir? No    Do you have any history of severe renal impairment (eGFR < 30mL/min)? No    Do you have any history of hepatic impairment or abnormalities (e.g. hepatic panel, ALT, AST, ALK Phos, bilirubin)? No    Have you had a coronary stent placed in the previous 6 months? No    Is patient eligible to continue?   Yes, patient meets all eligibility requirements for the RN COVID treatment (as denoted by all no responses above).     Current Outpatient Medications   Medication Sig Dispense Refill     clindamycin (CLINDAGEL) 1 % external gel Apply topically daily 60 g 11     lactobacillus rhamnosus, GG, (CULTURELL) capsule Take 1 capsule by mouth daily       lisinopril (ZESTRIL) 2.5 MG tablet  Take 1 tablet (2.5 mg) by mouth daily 90 tablet 3     ondansetron (ZOFRAN ODT) 4 MG ODT tab Take 1 tablet (4 mg) by mouth every 8 hours as needed for nausea 30 tablet 0     venlafaxine (EFFEXOR XR) 150 MG 24 hr capsule Take 1 capsule (150 mg) by mouth daily 90 capsule 0     zolpidem ER (AMBIEN CR) 6.25 MG CR tablet Take 1 tablet (6.25 mg) by mouth nightly as needed for sleep 90 tablet 0       Medications from List 1 of the standing order (on medications that exclude the use of Paxlovid) that patient is taking: NONE. Is patient taking [place]'s Wort? No  Is patient taking [place]'s Wort or any meds from List 1? No.   Medications from List 2 of the standing order (on meds that provider needs to adjust) that patient is taking: NONE. Is patient on any of the meds from List 2? No.   Medications from List 3 of standing order (on meds that a RN needs to adjust) that patient is taking: zolpidem (Ambien, Intermezzo, Edluar): Is patient taking as needed and less than daily? Yes, instructed patient to stop taking zolpidem while taking Paxlovid and restart zolpidem 3 days after the completion of Paxlovid. Is patient on any meds from List 3? Yes. Patient is on at least one med that needs a provider visit and will be transferred to a  at the end of this call. Shani Bunch RN     Pt stated that she cannot be without the Ambien as she would not be sleeping Rn sent pt to schedule with the covid team     Patient stated an understanding and agreed with plan.    Shani Bunch RN, BSN  [facility] Triage

## 2022-12-27 ENCOUNTER — VIRTUAL VISIT (OUTPATIENT)
Dept: URGENT CARE | Facility: CLINIC | Age: 67
End: 2022-12-27
Payer: COMMERCIAL

## 2022-12-27 DIAGNOSIS — U07.1 COVID: Primary | ICD-10-CM

## 2022-12-27 PROCEDURE — 99213 OFFICE O/P EST LOW 20 MIN: CPT | Mod: CS | Performed by: EMERGENCY MEDICINE

## 2022-12-27 NOTE — PROGRESS NOTES
"Video visit:   Start time: 11:00 AM   Stop time: 11:12 AM   Duration: 12 minutes   Patient location: Home   Provider location:  [facility] virtual provider (remote).    Platform used for video visit: Lexim       the patient has been notified of following:     \"This video visit will be conducted via a call between you and your physician/provider. We have found that certain health care needs can be provided without the need for a physical exam.  This service lets us provide the care you need with a short phone conversation.  If a prescription is necessary we can send it directly to your pharmacy.  If lab work is needed we can place an order for that and you can then stop by our lab to have the test done at a later time.    Video visits are billed at different rates depending on your insurance coverage. During this emergency period, for some insurers they may be billed the same as an in-person visit.  Please reach out to your insurance provider with any questions.    If during the course of the call the physician/provider feels a video visit is not appropriate, you will not be charged for this service.\"    Patient has given verbal consent for Telephone visit?  Yes    What phone number would you like to be contacted at?  [phone]    How would you like to obtain your AVS? MyChart    Subjective   CC: Jolynn Haji  is a 67 year old female who presents via phone visit today for the following health issues:   Chief Complaint   Patient presents with     Infection        COVID-19 Symptom Review  How many days ago did these symptoms start? 3    Are any of the following symptoms significant for you?    New or worsening difficulty breathing? No    Worsening cough? Yes, I am coughing up mucus.    Fever or chills? No    Headache: No    Sore throat: YES    Chest pain: No    Diarrhea: No    Body aches? No    What treatments has patient tried? Acetaminophen   Does patient live in a nursing home, group home, or shelter? " No  Does patient have a way to get food/medications during quarantined? Yes, I have a friend or family member who can help me. and Yes                       Reviewed and updated as needed this visit by Provider                  Review of Systems         Objective    Gen: Patient is alert, oriented  Gen: No acute distress on video visit.  Nondyspneic appearing speaking in full sentences.              Assessment/Plan:  Patient is a 67-year-old female who is on day 3 of COVID infection.  Symptoms are typical and generally mild including no shortness of breath.  Patient has history of hypertension, rainouts phenomena and severe anxiety.  She consents to taking paxlovid.  GFR is greater than 90.  No medication interaction concerns.        Ta Fournier MD

## 2022-12-30 ENCOUNTER — TELEPHONE (OUTPATIENT)
Dept: FAMILY MEDICINE | Facility: CLINIC | Age: 67
End: 2022-12-30

## 2022-12-30 NOTE — TELEPHONE ENCOUNTER
S-(situation): Patient calls with questions regarding Paxlovid.    B-(background): She tested positive for Covid earlier this week and was started on Paxlovid Tuesday. Patient states that she retested today and test was negative. She asks if she should continue Paxlovid or not.     A-(assessment): Patient is feeling a little better, throat is not as sore and able to talk today, but still not back to normal. She is having diarrhea, but believes this is from the Paxlovid. Otherwise, no other new symptoms present.     R-(recommendations): Advised patient to continue the Paxlovid, the negative test could be from the medication working to treat her symptoms. Discussed rebound Covid symptoms could possibly develop after completing Paxlovid and cause her to test positive again. If this happened, recommended patient restart isolation at home, but would not need to repeat Paxlovid. Also advised patient to continue to push fluids and could also add 1/2 strength Gatorade due to diarrhea. Patient verbalizes understanding and agrees with plan.     Margie Cai RN  [facility]

## 2023-01-02 PROBLEM — G21.19 DRUG-INDUCED PARKINSONISM (H): Status: ACTIVE | Noted: 2023-01-02

## 2023-01-02 PROBLEM — F39 MOOD DISORDER (H): Status: ACTIVE | Noted: 2023-01-02

## 2023-01-03 NOTE — PROGRESS NOTES
[facility] Integrated Behavioral Health  January 4, 2023      Behavioral Health Clinician Progress Note    Patient Name: Jolynn Haji           Service Type:  Individual      Service Location:   MyChart / Email (patient reached)     Session Start Time: 8:30 am  Session End Time: 9:09 am      Session Length: 38 - 52      Attendees: Patient     Service Modality:  Video Visit:      Provider verified identity through the following two step process.  Patient provided:  Patient is known previously to provider    Telemedicine Visit: The patient's condition can be safely assessed and treated via synchronous audio and visual telemedicine encounter.      Reason for Telemedicine Visit: Patient has requested telehealth visit    Originating Site (Patient Location): Patient's home    Distant Site (Provider Location): [facility]    Consent:  The patient/guardian has verbally consented to: the potential risks and benefits of telemedicine (video visit) versus in person care; bill my insurance or make self-payment for services provided; and responsibility for payment of non-covered services.     Patient would like the video invitation sent by:  My Chart    Mode of Communication:  Video Conference via [employer]    As the provider I attest to compliance with applicable laws and regulations related to telemedicine.    Visit Activities (Refresh list every visit): [facility] Only    Diagnostic Assessment Date: 9/27/22  Treatment Plan Review Date: 1/5/23  See Flowsheets for today's PHQ-9 and TIFFANY-7 results  Previous PHQ-9:   PHQ-9 SCORE 9/29/2022 10/28/2022 12/15/2022   PHQ-9 Total Score - - -   PHQ-9 Total Score [facility] 7 (Mild depression) 5 (Mild depression) 4 (Minimal depression)   PHQ-9 Total Score 7 5 4   Some encounter information is confidential and restricted. Go to Review Flowsheets activity to see all data.     Previous TIFFANY-7:   TIFFANY-7 SCORE 10/28/2022 12/6/2022 12/6/2022    Total Score - - -   Total Score 7 (mild anxiety) - 3 (minimal anxiety)   Total Score 7 3 3       KARINA LEVEL:  KARINA Score (Last Two) 8/27/2010 2/22/2011   KARINA Raw Score 51 49   Activation Score 91.6 82.8   KARINA Level 4 4       DATA  Extended Session (60+ minutes): No  Interactive Complexity: No  Crisis: No  [facility] Patient: No    Treatment Objective(s) Addressed in This Session:  Target Behavior(s): anxiety and depression    Depressed Mood: Increase interest, engagement, and pleasure in doing things  Decrease frequency and intensity of feeling down, depressed, hopeless  Improve quantity and quality of night time sleep / decrease daytime naps  Feel less tired and more energy during the day   Improve diet, appetite, mindful eating, and / or meal planning  Identify negative self-talk and behaviors: challenge core beliefs, myths, and actions  Improve concentration, focus, and mindfulness in daily activities   Feel less fidgety, restless or slow in daily activities / interpersonal interactions  Anxiety: will experience a reduction in anxiety, will develop more effective coping skills to manage anxiety symptoms, will develop healthy cognitive patterns and beliefs and will increase ability to function adaptively    Current Stressors / Issues:  Pt reports she has been doing pretty well despite having recent COVID. She enjoyed her holidays and was able to work through high anxiety when an unexpected situation arose. She has been feeling more motivated to get [place] decor put away. She has been sleeping roughly 7 hours at night. She notes feeling really well after sleeping that much. States increase in memory when she has better sleep. Pt continues to work on tech related to decreasing anxiety.     12/20/22  Says that she is doing pretty good with KAYAK right around the corner. She has been proactive in her planning to decrease stress and anxiety. Reports consistent 6.5 hours of sleep on average. She would like to ideally  "get 8-9 hours of sleep a night. Discussed talking with Dr. Pompa again about the benefits of a sleep study at her next appt. Pt continues to utilize her breathing and relaxation tech. Reports she feels she is \"maintaining\" in regards to progress with anxiety which is her goal for the holiday season. Discussed the importance of remaining active, socially engaged, and exploring anxiety provoking activities. She is reading a book recommended by [facility] and has been open to concepts the book has been offered.     12/14/22  Met with pt this morning. Continues to endorse daily anxiety but is feeling more in control of her responses to it. Discussed appt with Dr. Pompa. Reports she has been she has been drinking more water to help with headaches. Has noted some improvement with headaches. Continues to have afternoon slump. She has been using the Calm lorena prior to her anxiety that comes around 4:30. Discussed racing heart rate when anxious. Normalized her experience and discussed nature of anxiety and panic attacks. Discussed an event when she noticed she was having increased anxiety baking cookies for the holidays. Explored internal dialog she has with herself in those moments. She was able to continue to move through with baking despite the anxiety. Reports she was able to go to Buddhism this past Sunday and enjoyed self. Discussed knowing her limits and pushing self into more anxious situations. Worked on inner dialog in those events. Discussed staying in the present as much as possibe. Recommended some reading on Anxiety and Panic to gain more understanding of what is happing to her when feeling more anxious.       12/7/22  Met with pt this morning, reports she is pretty good. She continues to work on breathing techs. She reports around 5pm is when she is needing to use it. Tends to be more anxious around 4:30-5:30pm. Then anxiety resolves and she can proceed with her evening. Reports she has been socializing with her friends " for lunch and [place] shopping. Reports some anxiety about in socializing but has seen a great amount of improvement. She has been attending Shinto frequently however a headache has been interfering. She wants to discuss headaches and a slump time in the afternoon with Dr. Pompa on Friday. Discussed inflexibility and working on allowing for change in her plans. Discussed rigidness to her scheduling.      11/29/22  Met with pt this morning. Reports some anxiety with logging on to the appt. Reports Anettegiving went really well despite making changes to the normal traditions. States that she also had family over for her husbands birthday and cooked dinner for everyone. She has not done this in many months and she stated it felt really good to do. Reports some anxiety around [place] shopping. As an alternative she has been ordering gifts online this year. Sleep continues to improve. States she sleep 7 hours last night. She continues to attend to self talk. Discussed check ins with self to assess level of anxiety.       11/15/22  Met with pt today for follow-up. She reports doing really well. She went out to dinner with family. She really enjoyed herself at dinner which is a difference from several months ago. She is really enjoying spending time with her grand children. On Sunday she drove on her own from the first time in a very long time. [facility] validated and praised pts efforts. Reports still is having some general anxiety through out the day. She is looking forward to a sleep over with her julio. Discussed finding ways to access the comforts of meditation on the go. Discussed the up coming holidays and the work she is doing to avoid negative impact on her mental health. Reports having headaches consistently and an afternoon slump/tiredness.     11/9/22  Met with pt this morning. Report 6-7 hours of sleep which is an improvement from 3-4 hours. Reports the daily anxiety has greatly improved. States her  "anxiety is about half the amount that it was since last. She has been engaging socially. States she even has begun to attend Mu-ism again. She has been active in acknowledging the anxiety more objectively. She continues with her exercises. She has not had as much fog in the afternoon. Reinforced the need to continue practicing the tools she finds helpful from therapy. Normalized setbacks. Discussed SI from last session. Reports she has noticed them decreasing. Continues to feel the thoughts are highly unwanted and not matching how she feels. Denies plans or intentions.         10/31/22  Met with pt this morning. She states she is doing well today. Reports going to bed a bit later to possibly avoid such an early waking. Anxiety has been somewhat improved since last visit. Continues to have some physical sx such as chest tightening. Continues to have nervous feeling through the day. Continues to have anxiety when doing things socially. She has been writing a journal with a gratitude list. Exercising daily with walking the dog. Using the Calm lorena for meditation and imagery. She is working on acknowledging the anxiety and attempting to not  it. Unexpected events or plans tend to bring up a lot of anxiety. Discussed anxiety about planning for Thanksgiving. She is not feeling up to planning and doing the prep. She is having trouble with frustration with self. Discussed acceptance around the progress of her mental health and listening to family who are offering her a lot of support.     Pt brought up the reoccurring thoughts she has been having where she thinks to herself \"I wish I were dead.\" This thought comes automatically when she is feeling not up for doing something. States this is not a true feeling of hers but the thought continues to enter her mind. She is unnerved by this at times. She denies plan or intention. She is concerned it could be medication related as she noticed it start around the time of " "starting meds for anxiety. She describes these thoughts as fleeting and easily controlled. [facility] communicated this information with Dr. Pompa, pts psychiatrist. Dr. Pompa will touch base with pt via [email or web address].       10/14/22  Met with pt this morning who is reporting anxiety has improved a bit since last visit. States she has been active in body scanning, incorporating meditation back into her life and use of imagery. Discussed continued engagement in anxiety provoking situations. Discussed attempting to observe anxiety in a non- mental and objective manner. States people around her have noticed an improvement in her sx however she continues to feel \"like a wreck inside.\" Finally, discussed examining situations in which she is attempting to have control over. Worked on identifying areas in which she has no or little control. Discussed attempts to let go. Does report some issues with sleep on fast release Ambien. Pt has follow up appt with Dr. Pompa in just over a week. She has been taking long release Ambien which allows her to sleep longer throughout the night. Encouraged her to reach out to provider prior to appt if issues worsen.     10/5/22  Reports doing well today. She has been trying to remain social. She tends to have an initial panic stricken feeling when committing to something with others. Since last med adjustment appears more calmer. Discussed areas in her life which appear to cause most of her anxiety. One area is around situations she has little control over, such as the result of her mental health. Discussed focus on acceptance of current state of mental health and focus on things she does have control over such as active steps she is taking to help her mental health. Discussed deep breathing exercise as a way of calming self with highly anxious. Introduced progressive muscle relaxation. Briefly discussed getting into a practice of meditation again. Finally, discussed finding frequent exercise as a " part of anxiety. This is a bit more difficult as she has a bad knee which has been limiting for her. Pt is committed to trying out tech. Prior to next session.        9/27/22 DA  Met with pt for initial assessment. Reports several years ago she had a significant head injury which resulted in her inability to sleep. She reports anxiety followed shortly after. Since April of this year she has been experiencing more depressive sx. Her anxiety is mostly around wanting to feel better from her anxiety and not having positive results. Some of the depressive sx she has been having is poor appetite, lack of interest, struggle with focus, low energy, and has had on very rare occasions fleeting thoughts of wishing she were dead to end the pain of depression. Her anxiety sx often are in the form of nervousness, restlessness, and rumination. She used to have panic attacks each morning, however this has subsided for several months now. She has not attempted therapy before. She would like to gain skills relating to managing anxiety. She currently is working with Dr. Pompa for medications.     Progress on Treatment Objective(s) / Homework:  Satisfactory progress - ACTION (Actively working towards change); Intervened by reinforcing change plan / affirming steps taken    Motivational Interviewing    MI Intervention: Expressed Empathy/Understanding, Supported Autonomy, Collaboration, Evocation, Open-ended questions, Change talk (evoked) and Reframe     Change Talk Expressed by the Patient: Desire to change Ability to change Reasons to change Need to change    Provider Response to Change Talk: E - Evoked more info from patient about behavior change, A - Affirmed patient's thoughts, decisions, or attempts at behavior change, R - Reflected patient's change talk and S - Summarized patient's change talk statements      Care Plan review completed: Yes    Medication Review:  No changes to current psychiatric medication(s)    Medication  Compliance:  Yes    Changes in Health Issues:   None reported    Chemical Use Review:   Substance Use: Chemical use reviewed, no active concerns identified      Tobacco Use: No current tobacco use.      Assessment: Current Emotional / Mental Status (status of significant symptoms):  Risk status (Self / Other harm or suicidal ideation)  Patient denies a history of suicidal ideation, suicide attempts, self-injurious behavior, homicidal ideation, homicidal behavior and and other safety concerns  Patient denies current fears or concerns for personal safety.  Patient denies current or recent suicidal ideation or behaviors.  Patient denies current or recent homicidal ideation or behaviors.  Patient denies current or recent self injurious behavior or ideation.  Patient denies other safety concerns.  A safety and risk management plan has not been developed at this time, however patient was encouraged to call [place] [phone] should there be a change in any of these risk factors.    Appearance:   Appropriate   Eye Contact:   Good   Psychomotor Behavior: Normal   Attitude:   Cooperative   Orientation:   All  Speech   Rate / Production: Normal    Volume:  Normal   Mood:    Anxious   Affect:    Appropriate   Thought Content:  Clear   Thought Form:  Coherent  Logical   Insight:    Good     Diagnoses:  1. Generalized anxiety disorder    2. Insomnia, unspecified type    3. Mood disorder (H)        Collateral Reports Completed:  Not Applicable    Plan: (Homework, other):  Patient was given information about behavioral services and encouraged to schedule a follow up appointment with the clinic [facility] in 1 week.  She was also given information about mental health symptoms and treatment options .  CD Recommendations: No indications of CD issues.  Erika Francisco [facility]      ______________________________________________________________________    Integrated Primary Care Behavioral Health Treatment Plan    Patient's Name: Jolynn LAURO  Adithya  YOB: 1955    Date of Creation: 10/5/22  Date Treatment Plan Last Reviewed/Revised: 10/5/22    DSM5 Diagnoses: 300.02 (F41.1) Generalized Anxiety Disorder  Psychosocial / Contextual Factors: Individual Factors high anxiety associated with depressive sx, interfereing with ability to function as she would like.  .  PROMIS (reviewed every 90 days):     Referral / Collaboration:  Was/were discussed and patient will pursue. - individual therapy    Anticipated number of session for this episode of care: 5-8 sessions  Anticipation frequency of session: Weekly  Anticipated Duration of each session: 38-52 minutes  Treatment plan will be reviewed in 90 days or when goals have been changed.       MeasurableTreatment Goal(s) related to diagnosis / functional impairment(s)  Goal 1: Patient will work with provider to manage symptoms    I will know I've met my goal when when less anxious and feeling down.      Objective #A (Patient Action)    Patient will  attend all sessions.  Status: New - Date: 10/5/22     Intervention(s)  Therapist will teach educate patient on treatment options, clarify concerns, work with pt to overcome any resistance to compliance..    Objective #B  Patient will identify 3 fears / thoughts that contribute to feeling anxious.  Status: New - Date: 10/5/22     Intervention(s)  Therapist will educate patient on treatment options, clarify concerns, work with pt to overcome any resistance to compliance..    Objective #C  Patient will identify 3 initial signs or symptoms of anxiety.  Status: New - Date: 10/5/22     Intervention(s)  Therapist will educate patient on treatment options, clarify concerns, work with pt to overcome any resistance to compliance..        Patient has reviewed and agreed to the above plan.      Erika Francisco [facility]  January 4, 2023  Answers for HPI/ROS submitted by the patient on 12/6/2022  TIFFANY 7 TOTAL SCORE: 3

## 2023-01-04 ENCOUNTER — VIRTUAL VISIT (OUTPATIENT)
Dept: BEHAVIORAL HEALTH | Facility: CLINIC | Age: 68
End: 2023-01-04
Payer: COMMERCIAL

## 2023-01-04 DIAGNOSIS — G47.00 INSOMNIA, UNSPECIFIED TYPE: ICD-10-CM

## 2023-01-04 DIAGNOSIS — F39 MOOD DISORDER (H): ICD-10-CM

## 2023-01-04 DIAGNOSIS — F41.1 GENERALIZED ANXIETY DISORDER: Primary | ICD-10-CM

## 2023-01-04 PROCEDURE — 90834 PSYTX W PT 45 MINUTES: CPT | Mod: 95 | Performed by: SOCIAL WORKER

## 2023-01-11 ENCOUNTER — TELEPHONE (OUTPATIENT)
Dept: PSYCHIATRY | Facility: CLINIC | Age: 68
End: 2023-01-11

## 2023-01-11 NOTE — TELEPHONE ENCOUNTER
1. RN received refill request from [facility] for venlafaxine (EFFEXOR XR) 150 MG 24 hr capsule.     2. This medication was last prescribed on 10/24/22 for 90 capsules with 0 refills. Pt should not need a new prescription until around 01/24/23.     3. Therefore, RN sent reply back to pharmacy that refill request will be DENIED.     Paula Hernandez RN on 1/11/2023 at 1:51 PM

## 2023-01-12 ASSESSMENT — ANXIETY QUESTIONNAIRES
7. FEELING AFRAID AS IF SOMETHING AWFUL MIGHT HAPPEN: NOT AT ALL
1. FEELING NERVOUS, ANXIOUS, OR ON EDGE: NEARLY EVERY DAY
GAD7 TOTAL SCORE: 3
GAD7 TOTAL SCORE: 3
2. NOT BEING ABLE TO STOP OR CONTROL WORRYING: NOT AT ALL
7. FEELING AFRAID AS IF SOMETHING AWFUL MIGHT HAPPEN: NOT AT ALL
6. BECOMING EASILY ANNOYED OR IRRITABLE: NOT AT ALL
5. BEING SO RESTLESS THAT IT IS HARD TO SIT STILL: NOT AT ALL
4. TROUBLE RELAXING: NOT AT ALL
3. WORRYING TOO MUCH ABOUT DIFFERENT THINGS: NOT AT ALL

## 2023-01-13 ENCOUNTER — VIRTUAL VISIT (OUTPATIENT)
Dept: BEHAVIORAL HEALTH | Facility: CLINIC | Age: 68
End: 2023-01-13
Payer: COMMERCIAL

## 2023-01-13 ENCOUNTER — OFFICE VISIT (OUTPATIENT)
Dept: FAMILY MEDICINE | Facility: CLINIC | Age: 68
End: 2023-01-13
Payer: COMMERCIAL

## 2023-01-13 VITALS
TEMPERATURE: 97.7 F | RESPIRATION RATE: 16 BRPM | OXYGEN SATURATION: 100 % | HEIGHT: 68 IN | WEIGHT: 131.7 LBS | DIASTOLIC BLOOD PRESSURE: 76 MMHG | SYSTOLIC BLOOD PRESSURE: 126 MMHG | BODY MASS INDEX: 19.96 KG/M2 | HEART RATE: 97 BPM

## 2023-01-13 DIAGNOSIS — F39 MOOD DISORDER (H): ICD-10-CM

## 2023-01-13 DIAGNOSIS — F43.23 ADJUSTMENT DISORDER WITH MIXED ANXIETY AND DEPRESSED MOOD: ICD-10-CM

## 2023-01-13 DIAGNOSIS — R41.843: ICD-10-CM

## 2023-01-13 DIAGNOSIS — I10 ESSENTIAL HYPERTENSION WITH GOAL BLOOD PRESSURE LESS THAN 140/90: ICD-10-CM

## 2023-01-13 DIAGNOSIS — G47.00 INSOMNIA, UNSPECIFIED TYPE: ICD-10-CM

## 2023-01-13 DIAGNOSIS — G21.19 DRUG-INDUCED PARKINSONISM (H): ICD-10-CM

## 2023-01-13 DIAGNOSIS — F41.1 GENERALIZED ANXIETY DISORDER: Primary | ICD-10-CM

## 2023-01-13 PROCEDURE — 99214 OFFICE O/P EST MOD 30 MIN: CPT | Performed by: FAMILY MEDICINE

## 2023-01-13 PROCEDURE — 90837 PSYTX W PT 60 MINUTES: CPT | Mod: 95 | Performed by: SOCIAL WORKER

## 2023-01-13 RX ORDER — LISINOPRIL 5 MG/1
TABLET ORAL
COMMUNITY
Start: 2023-01-11 | End: 2023-07-12 | Stop reason: DRUGHIGH

## 2023-01-13 ASSESSMENT — ANXIETY QUESTIONNAIRES
IF YOU CHECKED OFF ANY PROBLEMS ON THIS QUESTIONNAIRE, HOW DIFFICULT HAVE THESE PROBLEMS MADE IT FOR YOU TO DO YOUR WORK, TAKE CARE OF THINGS AT HOME, OR GET ALONG WITH OTHER PEOPLE: SOMEWHAT DIFFICULT
7. FEELING AFRAID AS IF SOMETHING AWFUL MIGHT HAPPEN: NOT AT ALL
7. FEELING AFRAID AS IF SOMETHING AWFUL MIGHT HAPPEN: NOT AT ALL
8. IF YOU CHECKED OFF ANY PROBLEMS, HOW DIFFICULT HAVE THESE MADE IT FOR YOU TO DO YOUR WORK, TAKE CARE OF THINGS AT HOME, OR GET ALONG WITH OTHER PEOPLE?: SOMEWHAT DIFFICULT
GAD7 TOTAL SCORE: 1
2. NOT BEING ABLE TO STOP OR CONTROL WORRYING: NOT AT ALL
GAD7 TOTAL SCORE: 1
GAD7 TOTAL SCORE: 1
4. TROUBLE RELAXING: NOT AT ALL
1. FEELING NERVOUS, ANXIOUS, OR ON EDGE: SEVERAL DAYS
3. WORRYING TOO MUCH ABOUT DIFFERENT THINGS: NOT AT ALL
5. BEING SO RESTLESS THAT IT IS HARD TO SIT STILL: NOT AT ALL
6. BECOMING EASILY ANNOYED OR IRRITABLE: NOT AT ALL

## 2023-01-13 ASSESSMENT — PATIENT HEALTH QUESTIONNAIRE - PHQ9
SUM OF ALL RESPONSES TO PHQ QUESTIONS 1-9: 2
SUM OF ALL RESPONSES TO PHQ QUESTIONS 1-9: 2
10. IF YOU CHECKED OFF ANY PROBLEMS, HOW DIFFICULT HAVE THESE PROBLEMS MADE IT FOR YOU TO DO YOUR WORK, TAKE CARE OF THINGS AT HOME, OR GET ALONG WITH OTHER PEOPLE: NOT DIFFICULT AT ALL

## 2023-01-13 NOTE — PATIENT INSTRUCTIONS
[facility]  [address] [place] [id number]  Office: [phone]   Fax:    [phone]     No follow-ups on file.

## 2023-01-13 NOTE — PROGRESS NOTES
Assessment & Plan :       ICD-10-CM    1. Generalized anxiety disorder  F41.1       2. Adjustment disorder with mixed anxiety and depressed mood- mild-moderate  at this time  F43.23       3. Drug-induced parkinsonism (H)  G21.19       4. Mood disorder (H)  F39       5. 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 - much improved - essentially gone    R41.843         Pt declines any lab work at this time for her headaches.keep eye appt. Discuss headache with Dr. Pompa.    Not doing any exercise right now.   Discussed restarting her exercise for physical and mental health as well.      Please,  continue your current medications and/or supplements and follow up as we discussed at your last visit.   Keep appts for further counseling  And with psychiatry as well.      Return in 6 weeks (on 2/24/2023) for depression/anxiety follow up, in person, w/ Dr. SUE for 40 minute appointment. per patient request.  She derives a great deal of psychological comfort from these in person visit and states she would like to continue them.     Ordering of each unique test  Prescription drug management  35 minutes spent on the date of the encounter doing chart review, history and exam, documentation and further activities per the note       MEDICATIONS:  Continue current medications without change  Regular exercise  See Patient Instructions    Return in 6 weeks (on 2/24/2023) for depression/anxiety follow up, in person, w/ Dr. SUE for 40 minute appointment.   Future Appointments 1/13/2023 - 7/12/2023      Date Visit Type Length Department Provider     1/19/2023 10:30 AM [facility] RETURN 30 min  BEHAVIORAL HEALTH Erika Francisco, DINESH    Location Instructions:     Your appointment is at [facility] located in the [facility] [place] [address]. Please check in at the  in [address].              1/19/2023 11:00 AM [facility] 30 min FZ PSYCHIATRY Judy Pompa DO    Location Instructions:     [facility] has 2 buildings on its campus. Please visit us at [address] [facility] [address] [place], [place] and enter the building with the numbers [id number] on it.               1/23/2023 10:00 AM C RETURN 60 min CS BEHAVIORAL HEALTH Erika Francicso, LICSW    Location Instructions:     Your appointment is at [facility] located in the [facility] at [address] [place] [address]. Please check in at the  in [address].              2/2/2023  2:00 PM ANNUAL WELLNESS 40 min  FAMILY PRACTICE Helga Ibarra MD    Location Instructions:     [facility] is located at [address], [address] Free parking is available; access the lot by turning north from [address] onto [address], then west onto [facility] [address] [facility] [address].              3/17/2023 12:30 PM P NEUROPSYCH EVAL 240 min [facility] NEUROPSYCHOLOGY Kassie Ashton, PhD    Location Instructions:     Located in the [facility] at [address] [address]. For parking options, enter the [facility] /arrival plaza from [facility] and attendants can assist you based on your needs.  parking is available for those with limited mobility M-F from 7 a.m. to 5 p.m. Due to short staffing, we are unable to offer  to all patients/visitors. Visit [email or web address] for more details.  Self-parking:&nbsp;  West Lot: Located across from the main entrance, this is a convenient option for patients. Enter on [address]. Parking attendants available most hours to assist.&nbsp;     [facility] Ramp: Enter at the [address] [facility] [address] entrance (one block north of the [facility] main entrance). Do not enter the ramp from [facility] - this entrance is not staffed and is further from the [facility] main entrance.              5/16/2023 10:30 AM RETURN 15 min OX DERMATOLOGY  Ligia Carbajal PA-C    Location Instructions:     [address] [id number]                         Helga Ibarra MD  [facility] [place]    Subjective :   Azalea is a 67 year old, presenting for the following health issues:  Recheck Medication    Getting some bitemporal headaches. Sometimes she's hungry and will eat a little something and that helps. Taking tylenol takes them away.   Has been drinking water 6- 8oz  cups a day.   Discussed may need to increase that to stay well hydrated in the winter.  Has her yearly eye appointment coming up in 2/2023.     No numbness, tingling, or weakness in upper or lower extremities. No new bowel or bladder control problems. No changes in speech, swallowing, hearing, coordination  or vision.        Started driving again after about 6 months - started with sitting in her garage, then pulled out of garage, then out of driveway, then out to [place] [address], then drove to [place], then drove to [place] house and then grocery shopping.    Seeing Dr. Pompa on 1/19/2023 and her therapist on same day.  Has been seeing Erika , her therapist, weekly on video.   Video has been going well.      Reading a new book on anxiety - Recognize, Accept and allow these anxious feelings, --- has found it really helpful.       Had covid-19 just after Dieudonne, but has recovered well.          History of Present Illness       Mental Health Follow-up:  Patient presents to follow-up on Anxiety.    Patient's anxiety since last visit has been:  Better  The patient is not having other symptoms associated with anxiety.  Any significant life events: No  Patient is feeling anxious or having panic attacks.  Patient has no concerns about alcohol or drug use.    She eats 2-3 servings of fruits and vegetables daily.She consumes 0 sweetened beverage(s) daily.She exercises with enough effort to increase her heart rate 10 to 19 minutes per day.  She exercises with  enough effort to increase her heart rate 4 days per week.   She is taking medications regularly.    Today's PHQ-9         PHQ-9 Total Score: 2    PHQ-9 Q9 Thoughts of better off dead/self-harm past 2 weeks :   Not at all    How difficult have these problems made it for you to do your work, take care of things at home, or get along with other people: Not difficult at all        Social History     Tobacco Use     Smoking status: Never     Smokeless tobacco: Never   Substance Use Topics     Alcohol use: Yes     Alcohol/week: 0.0 - 1.0 standard drinks     Comment: 2 per month     Drug use: No     Comment: no herbal meds either     PHQ 10/28/2022 12/15/2022 1/13/2023   PHQ-9 Total Score 5 4 2   Q9: Thoughts of better off dead/self-harm past 2 weeks Not at all Not at all Not at all   Some encounter information is confidential and restricted. Go to Review Flowsheets activity to see all data.     TIFFANY-7 SCORE 12/6/2022 1/12/2023 1/13/2023   Total Score - - -   Total Score 3 (minimal anxiety) 3 (minimal anxiety) 1 (minimal anxiety)   Total Score 3 3 1     Last PHQ-9 1/13/2023   1.  Little interest or pleasure in doing things 0   2.  Feeling down, depressed, or hopeless 0   3.  Trouble falling or staying asleep, or sleeping too much 1   4.  Feeling tired or having little energy 1   5.  Poor appetite or overeating 0   6.  Feeling bad about yourself 0   7.  Trouble concentrating 0   8.  Moving slowly or restless 0   Q9: Thoughts of better off dead/self-harm past 2 weeks 0   PHQ-9 Total Score 2   Difficulty at work, home, or with people -   Some encounter information is confidential and restricted. Go to Review Flowsheets activity to see all data.     TIFFANY-7  1/13/2023   1. Feeling nervous, anxious, or on edge 1   2. Not being able to stop or control worrying 0   3. Worrying too much about different things 0   4. Trouble relaxing 0   5. Being so restless that it is hard to sit still 0   6. Becoming easily annoyed or irritable 0    7. Feeling afraid, as if something awful might happen 0   TIFFANY-7 Total Score 1   If you checked any problems, how difficult have they made it for you to do your work, take care of things at home, or get along with other people? Somewhat difficult       Suicide Assessment Five-step Evaluation and Treatment (SAFE-T)    Patient Active Problem List   Diagnosis     Essential hypertension with goal blood pressure less than 140/90     Hidradenitis     Localized osteoarthritis of hand     Synovial cyst of popliteal space     DERMATOPHYTOSIS OF NAIL- toenails      Lipidosis     Osteopenia     Postmenopausal     NUMBNESS AND TINGLING OF FOOT - bilateral -mild      Numbness of feet- distal feet R>L      Stress incontinence, female - mild     Cough     Acute bronchitis- recurrent- ? related to mold in school building- pt has since retired and no further bronchitis     Rosacea     History of migraine headaches- assoc. with nausea/vomiting - resolved around menopause - were  premenstrual      Dupuytren's disease of palm - right 4th digit flexor tendon     Sun-damaged skin     Seasonal allergic rhinitis     Basal cell carcinoma of leg, right     Adjustment disorder with mixed anxiety and depressed mood- mild-moderate  at this time     Family history of celiac disease     Generalized anxiety disorder     Insomnia, unspecified type     Chronic constipation     Raynaud's phenomenon without gangrene     Other secondary osteoarthritis of left knee     Primary localized osteoarthrosis of left lower leg     Hyperlipidemia LDL goal <130     Basal cell carcinoma     Colon polyps     Screening for cervical cancer     Adjustment insomnia     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 - much improved - essentially gone       Drug-induced parkinsonism (H)- Abilify - generic = ARIPiprazole( 5mg)      Mood disorder (H)       Current Outpatient Medications   Medication Sig Dispense Refill  "    clindamycin (CLINDAGEL) 1 % external gel Apply topically daily 60 g 11     lactobacillus rhamnosus, GG, (CULTURELL) capsule Take 1 capsule by mouth daily       lisinopril (ZESTRIL) 2.5 MG tablet TAKE 1 TABLET (2.5 MG) BY MOUTH DAILY 90 tablet 0     lisinopril (ZESTRIL) 5 MG tablet Takes 1/2 tablet to equal 2.5 mg (Patient not taking: Reported on 1/13/2023)       venlafaxine (EFFEXOR XR) 150 MG 24 hr capsule Take 1 capsule (150 mg) by mouth At Bedtime 90 capsule 1     zolpidem ER (AMBIEN CR) 6.25 MG CR tablet Take 1 tablet (6.25 mg) by mouth nightly as needed for sleep 90 tablet 0          Allergies   Allergen Reactions     Abilify [Aripiprazole] Other (See Comments)     Suspected drug-induced Parkinsonism      Fosamax Diarrhea     Diarrhea, stomach cramps, jaw pain      Amoxicillin-Pot Clavulanate Itching and Rash     Augmentin Itching and Rash       Review of Systems :   Constitutional, HEENT, cardiovascular, pulmonary, GI, , musculoskeletal, neuro, skin, endocrine and psych systems are negative, except as otherwise noted.      Objective  :   /76 (BP Location: Right arm, Patient Position: Sitting, Cuff Size: Adult Regular)   Pulse 97   Temp 97.7  F (36.5  C) (Tympanic)   Resp 16   Ht 1.715 m (5' 7.5\")   Wt 59.7 kg (131 lb 11.2 oz)   LMP 06/28/2001   SpO2 100%   BMI 20.32 kg/m    Body mass index is 20.32 kg/m .   Physical Exam :   GENERAL: healthy, alert and no distress  EYES: Eyes grossly normal to inspection, PERRL and conjunctivae and sclerae normal  HENT: normal cephalic/atraumatic, nose and mouth without ulcers or lesions, oropharynx clear and oral mucous membranes moist  NECK: no adenopathy, no asymmetry, masses, or scars and thyroid normal to palpation  RESP: lungs clear to auscultation - no rales, rhonchi or wheezes  CV: regular rate and rhythm, normal S1 S2, no S3 or S4, no murmur, click or rub, no peripheral edema and peripheral pulses strong  MS: no gross musculoskeletal defects noted, " no edema  SKIN: no suspicious lesions or rashes  NEURO: Normal strength and tone, mentation intact and speech normal  PSYCH: mentation appears normal, affect normal/bright   no bitemporal tenderness to palpation.   Office Visit on 08/05/2022   Component Date Value Ref Range Status     Case Report 08/05/2022    Final                    Value:Surgical Pathology Report                         Case: [id number]                                  Authorizing Provider:  Ligia Carbajal PA-C    Collected:           08/05/2022 10:44 AM          Ordering Location:     [facility]   Received:            08/05/2022 04:17 PM                                 [facility]                                                           Pathologist:           Rodrigo Taylor MD                                                      Specimens:   A) - Skin, left forearm                                                                             B) - Skin, right lower leg                                                                  Final Diagnosis 08/05/2022    Final                    Value:This result contains rich text formatting which cannot be displayed here.     Clinical Information 08/05/2022    Final                    Value:This result contains rich text formatting which cannot be displayed here.     Gross Description 08/05/2022    Final                    Value:This result contains rich text formatting which cannot be displayed here.     Microscopic Description 08/05/2022    Final                    Value:This result contains rich text formatting which cannot be displayed here.     Performing Labs 08/05/2022    Final                    Value:This result contains rich text formatting which cannot be displayed here.

## 2023-01-13 NOTE — PROGRESS NOTES
[facility] Integrated Behavioral Health  January 13, 2023      Behavioral Health Clinician Progress Note    Patient Name: Jolynn Haji           Service Type:  Individual      Service Location:   MyChart / Email (patient reached)     Session Start Time: 9:00 am  Session End Time: 9:55 am      Session Length: 53 - 60      Attendees: Patient     Service Modality:  Video Visit:      Provider verified identity through the following two step process.  Patient provided:  Patient is known previously to provider    Telemedicine Visit: The patient's condition can be safely assessed and treated via synchronous audio and visual telemedicine encounter.      Reason for Telemedicine Visit: Patient has requested telehealth visit    Originating Site (Patient Location): Patient's home    Distant Site (Provider Location): [facility]    Consent:  The patient/guardian has verbally consented to: the potential risks and benefits of telemedicine (video visit) versus in person care; bill my insurance or make self-payment for services provided; and responsibility for payment of non-covered services.     Patient would like the video invitation sent by:  My Chart    Mode of Communication:  Video Conference via [employer]    As the provider I attest to compliance with applicable laws and regulations related to telemedicine.    Visit Activities (Refresh list every visit): [facility] [place] Only    Diagnostic Assessment Date: 9/27/22  Treatment Plan Review Date: 4/13/23  See Flowsheets for today's PHQ-9 and TIFFANY-7 results  Previous PHQ-9:   PHQ-9 SCORE 9/29/2022 10/28/2022 12/15/2022   PHQ-9 Total Score - - -   PHQ-9 Total Score [facility] 7 (Mild depression) 5 (Mild depression) 4 (Minimal depression)   PHQ-9 Total Score 7 5 4   Some encounter information is confidential and restricted. Go to Review Flowsheets activity to see all data.     Previous TIFFANY-7:   TIFFANY-7 SCORE 12/6/2022 12/6/2022 1/12/2023    Total Score - - -   Total Score - 3 (minimal anxiety) 3 (minimal anxiety)   Total Score 3 3 3       KARINA LEVEL:  KARINA Score (Last Two) 8/27/2010 2/22/2011   KARINA Raw Score 51 49   Activation Score 91.6 82.8   KARINA Level 4 4       DATA  Extended Session (60+ minutes): No  Interactive Complexity: No  Crisis: No  [facility] Patient: No    Treatment Objective(s) Addressed in This Session:  Target Behavior(s): anxiety and depression    Depressed Mood: Increase interest, engagement, and pleasure in doing things  Decrease frequency and intensity of feeling down, depressed, hopeless  Improve quantity and quality of night time sleep / decrease daytime naps  Feel less tired and more energy during the day   Improve diet, appetite, mindful eating, and / or meal planning  Identify negative self-talk and behaviors: challenge core beliefs, myths, and actions  Improve concentration, focus, and mindfulness in daily activities   Feel less fidgety, restless or slow in daily activities / interpersonal interactions  Anxiety: will experience a reduction in anxiety, will develop more effective coping skills to manage anxiety symptoms, will develop healthy cognitive patterns and beliefs and will increase ability to function adaptively    Current Stressors / Issues:  Reports she is doing pretty well. Reports she was wanting to try and push herself in working towards driving the car again. Reports she sat in the car first and it went well, then she pushed herself gradually to the point of driving to see a friend 16 mi away. Reports experience went really well for her and she plans to continue to drive other places such as the doctor this afternoon. Endorses heightened anxiety at the time of driving but reports no panic attacks. She has been very pleased and plans to continue to explore other areas of her life to push self. Endorses continued headaches despite increasing fluid intake, drinking one Liquid VI electrolyte packets a day and eating when  "headache comes on. Discussed significant diminish is abrupt short thoughts of SI (hardly ever happening anymore). Discussed finding herself and finding things in which bring her enjoyment in life. Reports her grandchildren as being at the top of the list. She continues to play cards, attend [employer], garden, clean and has volunteered in the past. Pt reports she is feeling better but not herself since being on meds. She discussed being on meds and what her plan would be long term. Encouraged pt to discuss with PCP and Psychiatrist further. Normalized her feelings about feeling better and pondering if meds are needed anymore. Advised her to not stop any medications without MD approval. Pt expresses understanding and will not alter medications on her own.       1/4/23  Pt reports she has been doing pretty well despite having recent COVID. She enjoyed her holidays and was able to work through high anxiety when an unexpected situation arose. She has been feeling more motivated to get [place] decor put away. She has been sleeping roughly 7 hours at night. She notes feeling really well after sleeping that much. States increase in memory when she has better sleep. Pt continues to work on tech related to decreasing anxiety.     12/20/22  Says that she is doing pretty good with [place] right around the corner. She has been proactive in her planning to decrease stress and anxiety. Reports consistent 6.5 hours of sleep on average. She would like to ideally get 8-9 hours of sleep a night. Discussed talking with Dr. Pompa again about the benefits of a sleep study at her next appt. Pt continues to utilize her breathing and relaxation tech. Reports she feels she is \"maintaining\" in regards to progress with anxiety which is her goal for the holiday season. Discussed the importance of remaining active, socially engaged, and exploring anxiety provoking activities. She is reading a book recommended by [facility] [place] and has been open to " concepts the book has been offered.     12/14/22  Met with pt this morning. Continues to endorse daily anxiety but is feeling more in control of her responses to it. Discussed appt with Dr. Pompa. Reports she has been she has been drinking more water to help with headaches. Has noted some improvement with headaches. Continues to have afternoon slump. She has been using the Calm lorena prior to her anxiety that comes around 4:30. Discussed racing heart rate when anxious. Normalized her experience and discussed nature of anxiety and panic attacks. Discussed an event when she noticed she was having increased anxiety baking cookies for the holidays. Explored internal dialog she has with herself in those moments. She was able to continue to move through with baking despite the anxiety. Reports she was able to go to [employer] this past Sunday and enjoyed self. Discussed knowing her limits and pushing self into more anxious situations. Worked on inner dialog in those events. Discussed staying in the present as much as possibe. Recommended some reading on Anxiety and Panic to gain more understanding of what is happing to her when feeling more anxious.       12/7/22  Met with pt this morning, reports she is pretty good. She continues to work on breathing techs. She reports around 5pm is when she is needing to use it. Tends to be more anxious around 4:30-5:30pm. Then anxiety resolves and she can proceed with her evening. Reports she has been socializing with her friends for lunch and [place] shopping. Reports some anxiety about in socializing but has seen a great amount of improvement. She has been attending [employer] frequently however a headache has been interfering. She wants to discuss headaches and a slump time in the afternoon with Dr. Pompa on Friday. Discussed inflexibility and working on allowing for change in her plans. Discussed rigidness to her scheduling.      11/29/22  Met with pt this morning. Reports some anxiety  with logging on to the appt. Reports Thanksgiving went really well despite making changes to the normal traditions. States that she also had family over for her husbands birthday and cooked dinner for everyone. She has not done this in many months and she stated it felt really good to do. Reports some anxiety around [place] shopping. As an alternative she has been ordering gifts online this year. Sleep continues to improve. States she sleep 7 hours last night. She continues to attend to self talk. Discussed check ins with self to assess level of anxiety.       11/15/22  Met with pt today for follow-up. She reports doing really well. She went out to dinner with family. She really enjoyed herself at dinner which is a difference from several months ago. She is really enjoying spending time with her grand children. On Sunday she drove on her own from the first time in a very long time. [facility] validated and praised pts efforts. Reports still is having some general anxiety through out the day. She is looking forward to a sleep over with her gannany. Discussed finding ways to access the comforts of meditation on the go. Discussed the up coming holidays and the work she is doing to avoid negative impact on her mental health. Reports having headaches consistently and an afternoon slump/tiredness.     11/9/22  Met with pt this morning. Report 6-7 hours of sleep which is an improvement from 3-4 hours. Reports the daily anxiety has greatly improved. States her anxiety is about half the amount that it was since last. She has been engaging socially. States she even has begun to attend [employer] again. She has been active in acknowledging the anxiety more objectively. She continues with her exercises. She has not had as much fog in the afternoon. Reinforced the need to continue practicing the tools she finds helpful from therapy. Normalized setbacks. Discussed SI from last session. Reports she has noticed them decreasing.  "Continues to feel the thoughts are highly unwanted and not matching how she feels. Denies plans or intentions.         10/31/22  Met with pt this morning. She states she is doing well today. Reports going to bed a bit later to possibly avoid such an early waking. Anxiety has been somewhat improved since last visit. Continues to have some physical sx such as chest tightening. Continues to have nervous feeling through the day. Continues to have anxiety when doing things socially. She has been writing a journal with a gratitude list. Exercising daily with walking the dog. Using the Calm lorena for meditation and imagery. She is working on acknowledging the anxiety and attempting to not  it. Unexpected events or plans tend to bring up a lot of anxiety. Discussed anxiety about planning for Thanksgiving. She is not feeling up to planning and doing the prep. She is having trouble with frustration with self. Discussed acceptance around the progress of her mental health and listening to family who are offering her a lot of support.     Pt brought up the reoccurring thoughts she has been having where she thinks to herself \"I wish I were dead.\" This thought comes automatically when she is feeling not up for doing something. States this is not a true feeling of hers but the thought continues to enter her mind. She is unnerved by this at times. She denies plan or intention. She is concerned it could be medication related as she noticed it start around the time of starting meds for anxiety. She describes these thoughts as fleeting and easily controlled. [facility] communicated this information with Dr. Pompa, pts psychiatrist. Dr. Pompa will touch base with pt via [employer].       10/14/22  Met with pt this morning who is reporting anxiety has improved a bit since last visit. States she has been active in body scanning, incorporating meditation back into her life and use of imagery. Discussed continued engagement in anxiety provoking " "situations. Discussed attempting to observe anxiety in a non- mental and objective manner. States people around her have noticed an improvement in her sx however she continues to feel \"like a wreck inside.\" Finally, discussed examining situations in which she is attempting to have control over. Worked on identifying areas in which she has no or little control. Discussed attempts to let go. Does report some issues with sleep on fast release Ambien. Pt has follow up appt with Dr. Pompa in just over a week. She has been taking long release Ambien which allows her to sleep longer throughout the night. Encouraged her to reach out to provider prior to appt if issues worsen.     10/5/22  Reports doing well today. She has been trying to remain social. She tends to have an initial panic stricken feeling when committing to something with others. Since last med adjustment appears more calmer. Discussed areas in her life which appear to cause most of her anxiety. One area is around situations she has little control over, such as the result of her mental health. Discussed focus on acceptance of current state of mental health and focus on things she does have control over such as active steps she is taking to help her mental health. Discussed deep breathing exercise as a way of calming self with highly anxious. Introduced progressive muscle relaxation. Briefly discussed getting into a practice of meditation again. Finally, discussed finding frequent exercise as a part of anxiety. This is a bit more difficult as she has a bad knee which has been limiting for her. Pt is committed to trying out tech. Prior to next session.        9/27/22 DA  Met with pt for initial assessment. Reports several years ago she had a significant head injury which resulted in her inability to sleep. She reports anxiety followed shortly after. Since April of this year she has been experiencing more depressive sx. Her anxiety is mostly around wanting " to feel better from her anxiety and not having positive results. Some of the depressive sx she has been having is poor appetite, lack of interest, struggle with focus, low energy, and has had on very rare occasions fleeting thoughts of wishing she were dead to end the pain of depression. Her anxiety sx often are in the form of nervousness, restlessness, and rumination. She used to have panic attacks each morning, however this has subsided for several months now. She has not attempted therapy before. She would like to gain skills relating to managing anxiety. She currently is working with Dr. Pompa for medications.     Progress on Treatment Objective(s) / Homework:  Satisfactory progress - ACTION (Actively working towards change); Intervened by reinforcing change plan / affirming steps taken    Motivational Interviewing    MI Intervention: Expressed Empathy/Understanding, Supported Autonomy, Collaboration, Evocation, Open-ended questions, Change talk (evoked) and Reframe     Change Talk Expressed by the Patient: Desire to change Ability to change Reasons to change Need to change    Provider Response to Change Talk: E - Evoked more info from patient about behavior change, A - Affirmed patient's thoughts, decisions, or attempts at behavior change, R - Reflected patient's change talk and S - Summarized patient's change talk statements      Care Plan review completed: Yes    Medication Review:  No changes to current psychiatric medication(s)    Medication Compliance:  Yes    Changes in Health Issues:   None reported    Chemical Use Review:   Substance Use: Chemical use reviewed, no active concerns identified      Tobacco Use: No current tobacco use.      Assessment: Current Emotional / Mental Status (status of significant symptoms):  Risk status (Self / Other harm or suicidal ideation)  Patient denies a history of suicidal ideation, suicide attempts, self-injurious behavior, homicidal ideation, homicidal behavior and and  other safety concerns  Patient denies current fears or concerns for personal safety.  Patient denies current or recent suicidal ideation or behaviors.  Patient denies current or recent homicidal ideation or behaviors.  Patient denies current or recent self injurious behavior or ideation.  Patient denies other safety concerns.  A safety and risk management plan has not been developed at this time, however patient was encouraged to call [place] [phone] should there be a change in any of these risk factors.    Appearance:   Appropriate   Eye Contact:   Good   Psychomotor Behavior: Normal   Attitude:   Cooperative   Orientation:   All  Speech   Rate / Production: Normal    Volume:  Normal   Mood:    Anxious   Affect:    Appropriate   Thought Content:  Clear   Thought Form:  Coherent  Logical   Insight:    Good     Diagnoses:  1. Generalized anxiety disorder    2. Insomnia, unspecified type        Collateral Reports Completed:  Not Applicable    Plan: (Homework, other):  Patient was given information about behavioral services and encouraged to schedule a follow up appointment with the clinic [facility] in 1 week.  She was also given information about mental health symptoms and treatment options .  CD Recommendations: No indications of CD issues.  Erika Francisco [facility]      ______________________________________________________________________    Integrated Primary Care Behavioral Health Treatment Plan    Patient's Name: Jolynn Haji  YOB: 1955    Date of Creation: 10/5/22  Date Treatment Plan Last Reviewed/Revised: 1/13/23    DSM5 Diagnoses: 300.02 (F41.1) Generalized Anxiety Disorder  Psychosocial / Contextual Factors: Individual Factors high anxiety associated with depressive sx, interfereing with ability to function as she would like.  .  PROMIS (reviewed every 90 days):     Referral / Collaboration:  Was/were discussed and patient will pursue. - individual therapy    Anticipated number of session for this  episode of care: 5-8 sessions  Anticipation frequency of session: Weekly  Anticipated Duration of each session: 38-52 minutes  Treatment plan will be reviewed in 90 days or when goals have been changed.       MeasurableTreatment Goal(s) related to diagnosis / functional impairment(s)  Goal 1: Patient will work with provider to manage symptoms    I will know I've met my goal when when less anxious and feeling down.      Objective #A (Patient Action)    Patient will  attend all sessions.  Status: Continued - Date(s):1/13/23     Intervention(s)  Therapist will teach educate patient on treatment options, clarify concerns, work with pt to overcome any resistance to compliance..    Objective #B  Patient will identify 3 fears / thoughts that contribute to feeling anxious.  Status: Continued - Date(s):1/13/23     Intervention(s)  Therapist will educate patient on treatment options, clarify concerns, work with pt to overcome any resistance to compliance..    Objective #C  Patient will identify 3 initial signs or symptoms of anxiety.  Status: Continued - Date(s):1/13/23     Intervention(s)  Therapist will educate patient on treatment options, clarify concerns, work with pt to overcome any resistance to compliance..        Patient has reviewed and agreed to the above plan.      DINESH Whipple  January 13, 2023  Answers for HPI/ROS submitted by the patient on 12/6/2022  TIFFANY 7 TOTAL SCORE: 3

## 2023-01-14 ASSESSMENT — ANXIETY QUESTIONNAIRES: GAD7 TOTAL SCORE: 1

## 2023-01-18 NOTE — PROGRESS NOTES
[facility]  January 19, 2023      Behavioral Health Clinician Progress Note    Patient Name: Jolynn Haji           Service Type:  Individual      Service Location:   [facility]     Session Start Time: 10:30 am  Session End Time: 10:53 am      Session Length: 16 - 37      Attendees: Patient     Service Modality:  Video Visit:      Provider verified identity through the following two step process.  Patient provided:  Patient is known previously to provider    Telemedicine Visit: The patient's condition can be safely assessed and treated via synchronous audio and visual telemedicine encounter.      Reason for Telemedicine Visit: Services only offered telehealth    Originating Site (Patient Location): Patient's home    Distant Site (Provider Location): Provider Remote Setting- Home Office    Consent:  The patient/guardian has verbally consented to: the potential risks and benefits of telemedicine (video visit) versus in person care; bill my insurance or make self-payment for services provided; and responsibility for payment of non-covered services.     Patient would like the video invitation sent by:  My Chart    Mode of Communication:  Video Conference via [facility]    As the provider I attest to compliance with applicable laws and regulations related to telemedicine.    Visit Activities (Refresh list every visit): [facility] Only    Diagnostic Assessment Date: 05/12/2022  Treatment Plan Review Date: 12/01/2022  See Flowsheets for today's PHQ-9 and TIFFANY-7 results  Previous PHQ-9:   PHQ-9 SCORE 10/28/2022 12/15/2022 1/13/2023   PHQ-9 Total Score - - -   PHQ-9 Total Score MyChart 5 (Mild depression) 4 (Minimal depression) 2 (Minimal depression)   PHQ-9 Total Score 5 4 2   Some encounter information is confidential and restricted. Go to Review Flowsheets activity to see all data.     Previous TIFFANY-7:   TIFFANY-7 SCORE 12/6/2022 1/12/2023 1/13/2023   Total Score - - -   Total Score 3  (minimal anxiety) 3 (minimal anxiety) 1 (minimal anxiety)   Total Score 3 3 1       KARINA LEVEL:  KARINA Score (Last Two) 8/27/2010 2/22/2011   KARINA Raw Score 51 49   Activation Score 91.6 82.8   KARINA Level 4 4       DATA  Extended Session (60+ minutes): No  Interactive Complexity: No  Crisis: No  [facility] Patient: No    Treatment Objective(s) Addressed in This Session:  Target Behavior(s): anxiety    Anxiety: will develop more effective coping skills to manage anxiety symptoms    Current Stressors / Issues:  Reports she continues to explore driving more. It has been going really well. Denies anxiety sx associated with driving. Anxiety continues generally in the evening hours. Noticed distraction has been really helpful. Continues utilizing tech discussed in therapy. Has been working out more at work out facility with friends. Discussed changing perspectives when when feeling anxious. Continues to have headaches. Sleep continues to be better around 6.5-7hrs. Often wonders how she is suppose to feel on the medications, not feeling fully herself. Talked about possibly reducing medications taking. Reports more good days than bad days in the past 2-3 weeks. Discussed not having the same energy level she had before.          1/13/23 (Erika Mcneal, [facility])  Reports she is doing pretty well. Reports she was wanting to try and push herself in working towards driving the car again. Reports she sat in the car first and it went well, then she pushed herself gradually to the point of driving to see a friend 16 mi away. Reports experience went really well for her and she plans to continue to drive other places such as the doctor this afternoon. Endorses heightened anxiety at the time of driving but reports no panic attacks. She has been very pleased and plans to continue to explore other areas of her life to push self. Endorses continued headaches despite increasing fluid intake, drinking one Liquid VI electrolyte packets a day and eating  "when headache comes on. Discussed significant diminish is abrupt short thoughts of SI (hardly ever happening anymore). Discussed finding herself and finding things in which bring her enjoyment in life. Reports her grandchildren as being at the top of the list. She continues to play cards, attend [employer], garden, clean and has volunteered in the past. Pt reports she is feeling better but not herself since being on meds. She discussed being on meds and what her plan would be long term. Encouraged pt to discuss with PCP and Psychiatrist further. Normalized her feelings about feeling better and pondering if meds are needed anymore. Advised her to not stop any medications without MD approval. Pt expresses understanding and will not alter medications on her own.        1/4/23 (Erika Mcneal, [facility])  Pt reports she has been doing pretty well despite having recent COVID. She enjoyed her holidays and was able to work through high anxiety when an unexpected situation arose. She has been feeling more motivated to get [employer] decor put away. She has been sleeping roughly 7 hours at night. She notes feeling really well after sleeping that much. States increase in memory when she has better sleep. Pt continues to work on tech related to decreasing anxiety.      12/20/22 (Erika Mcneal, [facility])  Says that she is doing pretty good with [employer] right around the corner. She has been proactive in her planning to decrease stress and anxiety. Reports consistent 6.5 hours of sleep on average. She would like to ideally get 8-9 hours of sleep a night. Discussed talking with Dr. Pompa again about the benefits of a sleep study at her next appt. Pt continues to utilize her breathing and relaxation tech. Reports she feels she is \"maintaining\" in regards to progress with anxiety which is her goal for the holiday season. Discussed the importance of remaining active, socially engaged, and exploring anxiety provoking activities. She is reading a " "book recommended by [facility] and has been open to concepts the book has been offered.      12/14/22 (Erika Mcneal, [facility])  Met with pt this morning. Continues to endorse daily anxiety but is feeling more in control of her responses to it. Discussed appt with Dr. Pompa. Reports she has been she has been drinking more water to help with headaches. Has noted some improvement with headaches. Continues to have afternoon slump. She has been using the Calm lorena prior to her anxiety that comes around 4:30. Discussed racing heart rate when anxious. Normalized her experience and discussed nature of anxiety and panic attacks. Discussed an event when she noticed she was having increased anxiety baking cookies for the holidays. Explored internal dialog she has with herself in those moments. She was able to continue to move through with baking despite the anxiety. Reports she was able to go to [employer] this past Sunday and enjoyed self. Discussed knowing her limits and pushing self into more anxious situations. Worked on inner dialog in those events. Discussed staying in the present as much as possibe. Recommended some reading on Anxiety and Panic to gain more understanding of what is happing to her when feeling more anxious.     12/9/22 (Eran Landon PsyD, LP)  Reported that she has been doing well overall. She spoke about having her grandchildren over recently and was pleased that she was able to watch them for 6 hours unsupervised and found it very pleasurable. She has been having headaches and believes it might be from Effexor. She started having them over the past month and has them most days and she estimated that they are a 5/10 in pain/discomfort. Her sleep is still sleeping roughly 6-7 hours and wakes up rested. Late afternoon (4-5 pm) she is more fatigued. She usually takes a few minutes to sit down and use the Calm lorena and she feels she gets a \"second wind\" and has sufficient energy for the rest of the day. She has been meeting " with Erika and enjoys their time together and finds it helpful for learning strategies to manage her thoughts.      11/07/2022 (Erika Fredis, [facility]):  Met with pt this morning, reports she is pretty good. She continues to work on breathing techs. She reports around 5pm is when she is needing to use it. Tends to be more anxious around 4:30-5:30pm. Then anxiety resolves and she can proceed with her evening. Reports she has been socializing with her friends for lunch and [employer] shopping. Reports some anxiety about in socializing but has seen a great amount of improvement. She has been attending [employer] frequently however a headache has been interfering. She wants to discuss headaches and a slump time in the afternoon with Dr. Pompa on Friday. Discussed inflexibility and working on allowing for change in her plans. Discussed rigidness to her scheduling.         11/29/2022 (Erika Mcneal, [facility]):  Met with pt this morning. Reports some anxiety with logging on to the appt. Reports Nicole went really well despite making changes to the normal traditions. States that she also had family over for her husbands birthday and cooked dinner for everyone. She has not done this in many months and she stated it felt really good to do. Reports some anxiety around [employer] shopping. As an alternative she has been ordering gifts online this year. Sleep continues to improve. States she sleep 7 hours last night. She continues to attend to self talk. Discussed check ins with self to assess level of anxiety.          11/15/2022 (Erika Fredis, [facility]):  Met with pt today for follow-up. She reports doing really well. She went out to dinner with family. She really enjoyed herself at dinner which is a difference from several months ago. She is really enjoying spending time with her grand children. On Sunday she drove on her own from the first time in a very long time. [facility] validated and praised pts efforts. Reports still is having some  general anxiety through out the day. She is looking forward to a sleep over with her julio. Discussed finding ways to access the comforts of meditation on the go. Discussed the up coming holidays and the work she is doing to avoid negative impact on her mental health. Reports having headaches consistently and an afternoon slump/tiredness.        11/09/2022 (Erika Mcneal, [facility]):  Met with pt this morning. Report 6-7 hours of sleep which is an improvement from 3-4 hours. Reports the daily anxiety has greatly improved. States her anxiety is about half the amount that it was since last. She has been engaging socially. States she even has begun to attend [employer] again. She has been active in acknowledging the anxiety more objectively. She continues with her exercises. She has not had as much fog in the afternoon. Reinforced the need to continue practicing the tools she finds helpful from therapy. Normalized setbacks. Discussed SI from last session. Reports she has noticed them decreasing. Continues to feel the thoughts are highly unwanted and not matching how she feels. Denies plans or intentions.            10/31/2022 (Erika Mcneal, [facility]):  Met with pt this morning. She states she is doing well today. Reports going to bed a bit later to possibly avoid such an early waking. Anxiety has been somewhat improved since last visit. Continues to have some physical sx such as chest tightening. Continues to have nervous feeling through the day. Continues to have anxiety when doing things socially. She has been writing a journal with a gratitude list. Exercising daily with walking the dog. Using the Calm lorena for meditation and imagery. She is working on acknowledging the anxiety and attempting to not  it. Unexpected events or plans tend to bring up a lot of anxiety. Discussed anxiety about planning for Thanksgiving. She is not feeling up to planning and doing the prep. She is having trouble with frustration with self.  "Discussed acceptance around the progress of her mental health and listening to family who are offering her a lot of support.      Pt brought up the reoccurring thoughts she has been having where she thinks to herself \"I wish I were dead.\" This thought comes automatically when she is feeling not up for doing something. States this is not a true feeling of hers but the thought continues to enter her mind. She is unnerved by this at times. She denies plan or intention. She is concerned it could be medication related as she noticed it start around the time of starting meds for anxiety. She describes these thoughts as fleeting and easily controlled. [facility] communicated this information with Dr. Pompa, pts psychiatrist. Dr. Pompa will touch base with pt via Blip.         10/24/2022:  Reported that going to the quick release Ambien was not helpful for her. She was falling asleep quickly but not staying asleep. She would wake up within about 3 hours and could not go back to sleep. She did that for 3 nights and then went back to the Ambien CR which was better because she could get about 6 hours that way. She is noticing her anxiety is still present. It is better but \"I don't think it's normal. I'm anxious about things that I shouldn't feel nervous about.\" She is working with Erika to continue learning skills to manage the anxiety but is open to medication adjustments if it will help even further.      10/03/2022:  Reported that she continues to wake up with anxiety. Approximately 3 days a week, she has an experience of \"hitting a wall,\" feeling more fatigued, and her mind is \"scattered\" such that she goes from job to job without finishing any of them. She spoke about how she has been continuing to engage in activities \"but it takes a lot of effort sometimes.\" She acknowledged that the anxiety does get better after she gets started. She is starting therapy soon and hopes to learn more skills to manage her anxiety " "better.      09/01/2022 (Pily Betts, [facility]):  MH Update: still not sleeping well, getting nauseous and worried it could be related to the Ambien or possibly the Effexor. Mood continues to improve but \"anxious to feel better.\" Has been spending time with friends and finds that helpful. Has moments of feeling \"thick headed\" during the day and worries is related to Ambien. Energy has increased during the day, able to accomplish more. Better concentrate and comprehending what she does read. [facility] validated patient for her continued distress.  [facility] normalized patient needing to allow herself, her brain and her medication more time to adjust to all of these changes.  [facility] processed patient using kindness with herself and trying not to compare how she is doing now with how she was doing in the past.  [facility] discussed patient using the idea of pacing and focusing on doing something for a small period of time instead of feeling that she needs to accomplish the entire task.  [facility] also discussed patient allowing family to help and simplifying where she can.      Patient indicates she saw a neurologist this week in order to establish baseline.  She was told by that provider but there was nothing to worry about and not to have anxiety about her health.  Patient has follow-up in 3 months.  Patient is hopeful that the symptoms she was experiencing were related to side effects of Abilify and not something more.    SI: passive thoughts of wishing life would get better, how long can I live like this. Denies having a plan or intent. No prior attempts. Has people and things to live for. Able to redirect self and feels safe. Has supports she can reach out to and aware of crisis resources.    Tx: not in therapy, [facility] to place referral for [facility] and health psychologist    Sleep: able to fall asleep but then wakes up at 3 am and cant get back to sleep. Not noticing any anxious thoughts. Sometimes wakes up due to nausea and waits for it to pass.  " "[facility] used CBT I to review sleep practices and praised patient for having a healthy routine and relaxation time.  [facility] discussed safe space visualization and patient trying to use this when she wakes up to see if it assists with her getting back to sleep.  Appetite: improved, had lost 30 pounds and feels that is getting back to better place    Etoh: denies  Substance: denies  Nicotine: denies  Caffeine: 1 cup of coffee per day    Most Important: side effect of feeling nauseous and still not sleeping well, sometimes nausea that wakes up. Is there an Ambien slow release or something else to stop the nausea?      08/11/2022:  Reported that she continues to improve. She stated that she is no longer walking in a shuffle. Her anxiety is much better. She is no longer waking up with intense anxiety. She is more active, socializing more often, and she is very pleased with her progress. She has noticed that every 4 days or so she wakes up at night after 3 hours and feels rested. She will stay in bed but will plan the day. Sometimes she feels tired later in the day and sometimes she does not. She said she had a concussion 4 years ago and \"I did not sleep at all for about a month.\" She would like to know if Ambien is addictive or if there are other options that might not be addictive. She found the 2.5mg did not help as much as 5mg. She would also like to know if she can take something late at night when she cannot go back to sleep. She also has questions about whether the venlafaxine is causing her sleep problems at night. She would also like to discuss the plan for the Lexapro. We spoke about ways to learn some skills to better manage worry.      07/28/2022:  Reported that she was doing well until about 10 days ago when she started waking up with significant anxiety for several hours rating the intensity as 8/10. \"I've never had that before. I had to cancel coffee with my friend because I was so anxious.\" She described the " "anxiety as her heart racing and breathing heavy. She notices that the anxiety is better in the afternoon and gone by the evening. Mirtazapine helped initially for 2 days, but not as much now. The increase in Lexapro has been fine. She would like to know why she might be waking up with this kind of anxiety in the mornings and when it might end.       06/14/2022:  Reported that she has not been able to notice a difference by lowering the Abilify. Reported that she is still feeling depressed and does not feel it has gotten any better/worse than when she first started with CCPS. She is waking up with cramps. She would like to know how long it will take for her to feel better and would like to know how much she needs to push herself to do things. She spoke about going to lunch with some friends and had some anticipation anxiety. She worried that she would not be able to stay focused on the conversation and participate. The anxiety did go away when she got there but she felt tired and had to leave about an hour later which is unusual as they usually have lunch for about 3 hours.       05/24/2022:  Reported that she can focus \"a little longer than before\" and she feels she is \"staying in the present\" rather than worrying about the next thing to come. Her sleep is a little better. Instead of waking up at 1:30am, she sleeps until about 4:30am, uses the bathroom, and she can go back to sleep. She still feels tired during the day and estimated that it is about the same as before. She said that she is able to complete tasks. \"I won't win any races but I can get them done now.\" She noted that she had no concerns about her current medications.      05/12/2022 (DA):  \"I'm hoping we can figure out what triggered this setback.\" She was diagnosed with anxiety and depression after a concussion a concussion in 04/2017. She had been sick at the time, got up one night and fell. She is uncertain where on her head she landed. She was on " "Lexapro, mirtazapine, and seroquel. She thought that it was \"fixed\", stopped taking the mirtazapine and seroquel but stayed on the lexapro. In January, started feeling more anxiety. PCP added Abilify which was helpful. The Lexapro was increased which she is not sure if it has been helpful (been on it for 4 weeks). She said, \"I feel like I'm in [place] land. I have a hard time thinking things through.\" Her anxiety and sleep is better but bothered by the slowed thinking.       Progress on Treatment Objective(s) / Homework:  Satisfactory progress - ACTION (Actively working towards change); Intervened by reinforcing change plan / affirming steps taken    Also provided psychoeducation about behavioral health condition, symptoms, and treatment options    Care Plan review completed: Yes    Medication Review:  Changes to psychiatric medications, see updated Medication List in EPIC.     Medication Compliance:  Yes    Changes in Health Issues:   Yes: Sleep disturbance, Associated Psychological Distress    Chemical Use Review:   Substance Use: Chemical use reviewed, no active concerns identified      Tobacco Use: No current tobacco use.      Assessment: Current Emotional / Mental Status (status of significant symptoms):  Risk status (Self / Other harm or suicidal ideation)  Patient denies a history of suicidal ideation, suicide attempts, self-injurious behavior, homicidal ideation, homicidal behavior and and other safety concerns  Patient denies current fears or concerns for personal safety.  Patient reports the following current or recent suicidal ideation or behaviors: Patient reports passive thoughts about questioning life but denies that she was suicidal.  Patient does feel safe.  Patient denies current or recent homicidal ideation or behaviors.  Patient denies current or recent self injurious behavior or ideation.  Patient denies other safety concerns.  A safety and risk management plan has not been developed at this time, " however patient was encouraged to call [place] [phone] should there be a change in any of these risk factors.    Appearance:   Appropriate   Eye Contact:   Good   Psychomotor Behavior: Normal   Attitude:   Cooperative   Orientation:   All  Speech   Rate / Production: Normal    Volume:  Normal   Mood:    Anxious   Affect:    Appropriate   Thought Content:  Clear   Thought Form:  Coherent  Logical   Insight:    Fair     Diagnoses:  1. Generalized anxiety disorder    2. Insomnia, unspecified type    3. Mood disorder (H)      Collateral Reports Completed:  Communicated with: Dr. Pompa    Plan: (Homework, other):  Patient was given information about behavioral services and encouraged to schedule a follow up appointment with the clinic [facility] in conjunction with next [facility] appointment.  She was also given information about mental health symptoms and treatment options .  CD Recommendations: No indications of CD issues. Erika Francisco MSW LICSW      ______________________________________________________________________    [facility] [place]: Treatment Plan    Patient's Name: Jolynn Haji  YOB: 1955    Date of Creation: May 24, 2022  Date Treatment Plan Last Reviewed/Revised: 12/09/2022    DSM5 Diagnoses: 300.02 (F41.1) Generalized Anxiety Disorder or 780.52 (G47.00) Insomnia Disorder   With non-sleep disorder mental comorbidity  Persistent    Psychosocial / Contextual Factors: family    PROMIS (reviewed every 90 days):   PROMIS 10-Global Health (only subscores and total score):   PROMIS-10 Scores Only 5/12/2022 7/1/2022 9/27/2022 9/27/2022 12/26/2022 1/17/2023 1/17/2023   Global Mental Health Score 9 17 10 10 14 17 17   Global Physical Health Score 17 13 17 17 17 18 18   PROMIS TOTAL - SUBSCORES [id number] [phone]       Referral / Collaboration:  The following referral(s) will be initiated: [facility] [place] to place referral for either [facility] or health psychology  services.    Anticipated number of session for this episode of care: 5-6  Anticipation frequency of session: As determined by Dr. Pompa  Anticipated Duration of each session: 16-37 minutes  Treatment plan will be reviewed in 90 days or when goals have been changed.       MeasurableTreatment Goal(s) related to diagnosis / functional impairment(s)  Goal 1: Patient will work with providers to manage symptoms    I will know I've met my goal when I can finish my tasks.      Objective #A (Patient Action)  Patient will attend all appointments, take medication as prescribed.  Status: Continued - Date(s):  12/09/2022    Intervention(s)  [facility] will Monitor and assist in overcoming barriers to treatment adherence    Objective #B  Patient will consider all recommendations offered.  Status: Continued - Date(s): 12/09/2022     Intervention(s)  [facility] will educate patient on treatment options, clarify concerns, work with pt to overcome any resistance to compliance.      Patient has reviewed and agreed to the above plan.      DINESH Whipple  01/19/2023

## 2023-01-19 ENCOUNTER — VIRTUAL VISIT (OUTPATIENT)
Dept: PSYCHIATRY | Facility: CLINIC | Age: 68
End: 2023-01-19
Payer: COMMERCIAL

## 2023-01-19 ENCOUNTER — VIRTUAL VISIT (OUTPATIENT)
Dept: BEHAVIORAL HEALTH | Facility: CLINIC | Age: 68
End: 2023-01-19
Payer: COMMERCIAL

## 2023-01-19 DIAGNOSIS — Z87.820 HISTORY OF TRAUMATIC BRAIN INJURY: ICD-10-CM

## 2023-01-19 DIAGNOSIS — G47.00 INSOMNIA, UNSPECIFIED TYPE: ICD-10-CM

## 2023-01-19 DIAGNOSIS — F41.1 GENERALIZED ANXIETY DISORDER: Primary | ICD-10-CM

## 2023-01-19 DIAGNOSIS — G21.19 OTHER DRUG-INDUCED SECONDARY PARKINSONISM (H): ICD-10-CM

## 2023-01-19 DIAGNOSIS — F39 MOOD DISORDER (H): ICD-10-CM

## 2023-01-19 DIAGNOSIS — F41.1 GAD (GENERALIZED ANXIETY DISORDER): Primary | ICD-10-CM

## 2023-01-19 PROCEDURE — 99214 OFFICE O/P EST MOD 30 MIN: CPT | Mod: 95 | Performed by: PSYCHIATRY & NEUROLOGY

## 2023-01-19 PROCEDURE — 90832 PSYTX W PT 30 MINUTES: CPT | Mod: 95 | Performed by: SOCIAL WORKER

## 2023-01-19 RX ORDER — ZOLPIDEM TARTRATE 6.25 MG/1
6.25 TABLET, FILM COATED, EXTENDED RELEASE ORAL
Qty: 90 TABLET | Refills: 0 | Status: SHIPPED | OUTPATIENT
Start: 2023-01-19 | End: 2023-03-23

## 2023-01-19 RX ORDER — VENLAFAXINE HYDROCHLORIDE 150 MG/1
150 CAPSULE, EXTENDED RELEASE ORAL AT BEDTIME
Qty: 90 CAPSULE | Refills: 1 | Status: SHIPPED | OUTPATIENT
Start: 2023-01-19 | End: 2023-02-28 | Stop reason: DRUGHIGH

## 2023-01-19 RX ORDER — VENLAFAXINE HYDROCHLORIDE 75 MG/1
75 CAPSULE, EXTENDED RELEASE ORAL 2 TIMES DAILY
Qty: 180 CAPSULE | Refills: 1 | Status: SHIPPED | OUTPATIENT
Start: 2023-01-19 | End: 2023-01-19

## 2023-01-19 NOTE — Clinical Note
Please call this patient to get them scheduled for a follow-up visit in 6 weeks. Please schedule with me and the [facility], DINESH Whipple. Thanks!

## 2023-01-19 NOTE — PROGRESS NOTES
"Telemedicine Visit: The patient's condition can be safely assessed and treated via synchronous audio and visual telemedicine encounter.      Reason for Telemedicine Visit: Patient has requested telehealth visit    Originating Site (Patient Location): Patient's home    Distant Location (provider location):  Off-site    Consent:  The patient/guardian has verbally consented to: the potential risks and benefits of telemedicine (video visit) versus in person care; bill my insurance or make self-payment for services provided; and responsibility for payment of non-covered services.     Mode of Communication:  Video Conference via [employer]    As the provider I attest to compliance with applicable laws and regulations related to telemedicine.        Outpatient Psychiatric Progress Note    Name: Jolynn Haji   : 1955                    Primary Care Provider: Helga Ibarra MD   [facility]    PHQ-9 scores:  PHQ-9 SCORE 10/28/2022 12/15/2022 2023   PHQ-9 Total Score - - -   PHQ-9 Total Score MyChart 5 (Mild depression) 4 (Minimal depression) 2 (Minimal depression)   PHQ-9 Total Score 5 4 2   Some encounter information is confidential and restricted. Go to Review Flowsheets activity to see all data.       TIFFANY-7 scores:  TIFFAYN-7 SCORE 2022   Total Score - - -   Total Score 3 (minimal anxiety) 3 (minimal anxiety) 1 (minimal anxiety)   Total Score 3 3 1       Patient Identification:  Patient is a 67 year old,   White Choose not to answer female  who presents for return visit with me.  Patient is currently retired. Patient attended the phone/video session with her daughter Malini rucker. Patient prefers to be called: \" Azalea\".    Interim History:  I last saw Jolynn Haji for outpatient psychiatry return visit on 2022. During that appointment, we:     Continue Ambien CR 6.25 mg at bedtime as needed for sleep. Do not take with alcohol or opioid " "pain medication. Make sure to have 8-10 hours to devote to sleep after taking. Careful/caution for falls in middle of night. Discussed sleep medicine referral previously but patient has declined-could consider in future.     Continue Effexor-XR/venlafaxine  mg daily for depression and anxiety.     Continue ondansetron/Zofran 4 mg every 8 hrs as needed for nausea caused by Effexor-XR changes.    Continue to follow with neurology as they recommend.      Continue all other cares per primary care provider.     1/19: Patient overall continuing to improve.  See [facility] note below for additional details and more specifics.  Still having some mild headaches that she does not feel are causing enough distress to change her medication.  She is having a significant amount of improvement and would like to keep her medications the same.  She feels like she is making good progress.  No acute safety concerns.  No SI.  No problematic drug or alcohol use.    Per [facility], DINESH Whipple, during today's team-based visit:  Reports she continues to explore driving more. It has been going really well. Denies anxiety sx associated with driving. Anxiety continues generally in the evening hours. Noticed distraction has been really helpful. Continues utilizing tech discussed in therapy. Has been working out more at work out facility with friends. Discussed changing perspectives when when feeling anxious. Continues to have headaches. Sleep continues to be better around 6.5-7hrs. Often wonders how she is suppose to feel on the medications, not feeling fully herself. Talked about possibly reducing medications taking. Reports more good days than bad days in the past 2-3 weeks. Discussed not having the same energy level she had before.          Past Psychiatric Med Trials:  Psych Meds at Intake:  lexapro 20 mg daily  abilify 5 mg daily      Past Psych Meds:  Amitriptyline  Quetiapine  Mirtazapine - stopped \"because I was sleeping through the " "night\"    Psychiatric ROS:  Jolynn RESTREPO Adithya reports mood has been: Continuing to improve    Anxiety has been: Continuing to improve  Sleep has been: See HPI above  Seema sxs: None  Psychosis sxs: N/A  ADHD/ADD sxs: N/A  PTSD sxs: N/A  PHQ9 and GAD7 scores were reviewed today if completed.   Medication side effects: Some fatigue and headaches late afternoon (patient takes medication at bedtime)  Current stressors include: Symptoms and See HPI above  Coping mechanisms and supports include: Family, Hobbies and Friends    Current medications include:   Current Outpatient Medications   Medication Sig     clindamycin (CLINDAGEL) 1 % external gel Apply topically daily     lactobacillus rhamnosus, GG, (CULTURELL) capsule Take 1 capsule by mouth daily     lisinopril (ZESTRIL) 2.5 MG tablet TAKE 1 TABLET (2.5 MG) BY MOUTH DAILY     lisinopril (ZESTRIL) 5 MG tablet Takes 1/2 tablet to equal 2.5 mg (Patient not taking: Reported on 1/13/2023)     venlafaxine (EFFEXOR XR) 150 MG 24 hr capsule Take 1 capsule (150 mg) by mouth daily     zolpidem ER (AMBIEN CR) 6.25 MG CR tablet Take 1 tablet (6.25 mg) by mouth nightly as needed for sleep     Current Facility-Administered Medications   Medication     lidocaine 1 % 4 mL     lidocaine 1 % injection 3 mL     lidocaine 1 % injection 4 mL     triamcinolone (KENALOG-40) injection 40 mg       Past Medical/Surgical History:  Past Medical History:   Diagnosis Date     Abnormal glandular Papanicolaou smear of cervix- ASCUS in 2000 12/5/2003    ASC H- 2000     Basal cell carcinoma of leg, right 05/2016    Dr Rogers     Colon polyps 05/2018    tubular adenoam - due 5 yrs - 2 mm     Concussion without loss of consciousness 4/12/2017     Hidradenitis     left  groin     HSIL (high grade squamous intraepithelial lesion) on Pap smear of cervix 7/5/2000    Normal paps from 2001 to 2016/cc     Hypertension goal BP (blood pressure) < 140/90      OA (osteoarthritis) of knee     moderate     Postmenopausal " since 7/2008      Shingles 7/23/2010    left      Squamous cell carcinoma of skin, unspecified      Synovial cyst of popliteal space       has a past medical history of Abnormal glandular Papanicolaou smear of cervix- ASCUS in 2000 (12/5/2003), Basal cell carcinoma of leg, right (05/2016), Colon polyps (05/2018), Concussion without loss of consciousness (4/12/2017), Hidradenitis, HSIL (high grade squamous intraepithelial lesion) on Pap smear of cervix (7/5/2000), Hypertension goal BP (blood pressure) < 140/90, OA (osteoarthritis) of knee, Postmenopausal (since 7/2008 ), Shingles (7/23/2010), Squamous cell carcinoma of skin, unspecified, and Synovial cyst of popliteal space.    She has no past medical history of Malignant melanoma (H) or Squamous cell carcinoma.    Social History:  Reviewed. No changes to social history except as noted above in HPI.    Vital Signs:   None. This is phone/video visit.     Labs:  Most recent laboratory results reviewed and the pertinent results include:   TSH   Date Value Ref Range Status   04/25/2022 1.30 0.40 - 4.00 mU/L Final   01/13/2021 2.07 0.40 - 4.00 mU/L Final     Lab Results   Component Value Date    WBC 8.1 04/25/2022    WBC 6.4 01/13/2021     Lab Results   Component Value Date    RBC 4.58 04/25/2022    RBC 4.54 01/13/2021     Lab Results   Component Value Date    HGB 13.6 04/25/2022    HGB 13.3 01/13/2021     Lab Results   Component Value Date    HCT 41.1 04/25/2022    HCT 41.7 01/13/2021     No components found for: MCT  Lab Results   Component Value Date    MCV 90 04/25/2022    MCV 92 01/13/2021     Lab Results   Component Value Date    MCH 29.7 04/25/2022    MCH 29.3 01/13/2021     Lab Results   Component Value Date    MCHC 33.1 04/25/2022    MCHC 31.9 01/13/2021     Lab Results   Component Value Date    RDW 12.9 04/25/2022    RDW 13.1 01/13/2021     Lab Results   Component Value Date     04/25/2022     01/13/2021     Last Comprehensive Metabolic  Panel:  Sodium   Date Value Ref Range Status   04/25/2022 137 133 - 144 mmol/L Final   01/13/2021 140 133 - 144 mmol/L Final     Potassium   Date Value Ref Range Status   04/25/2022 4.4 3.4 - 5.3 mmol/L Final   01/13/2021 4.1 3.4 - 5.3 mmol/L Final     Chloride   Date Value Ref Range Status   04/25/2022 105 94 - 109 mmol/L Final   01/13/2021 108 94 - 109 mmol/L Final     Carbon Dioxide   Date Value Ref Range Status   01/13/2021 28 20 - 32 mmol/L Final     Carbon Dioxide (CO2)   Date Value Ref Range Status   04/25/2022 26 20 - 32 mmol/L Final     Anion Gap   Date Value Ref Range Status   04/25/2022 6 3 - 14 mmol/L Final   01/13/2021 4 3 - 14 mmol/L Final     Glucose   Date Value Ref Range Status   04/25/2022 104 (H) 70 - 99 mg/dL Final   01/13/2021 91 70 - 99 mg/dL Final     Comment:     Fasting specimen     Urea Nitrogen   Date Value Ref Range Status   04/25/2022 23 7 - 30 mg/dL Final   01/13/2021 18 7 - 30 mg/dL Final     Creatinine   Date Value Ref Range Status   04/25/2022 0.78 0.52 - 1.04 mg/dL Final   01/13/2021 0.68 0.52 - 1.04 mg/dL Final     GFR Estimate   Date Value Ref Range Status   04/25/2022 83 >60 mL/min/1.73m2 Final     Comment:     Effective December 21, 2021 eGFRcr in adults is calculated using the 2021 CKD-EPI creatinine equation which includes age and gender (Mercedes et al., NEJM, DOI: 10.1056/WDGAqb9637272)   01/13/2021 >90 >60 mL/min/[1.73_m2] Final     Comment:     Non  GFR Calc  Starting 12/18/2018, serum creatinine based estimated GFR (eGFR) will be   calculated using the Chronic Kidney Disease Epidemiology Collaboration   (CKD-EPI) equation.       Calcium   Date Value Ref Range Status   04/25/2022 9.3 8.5 - 10.1 mg/dL Final   01/13/2021 8.8 8.5 - 10.1 mg/dL Final     Bilirubin Total   Date Value Ref Range Status   04/25/2022 0.4 0.2 - 1.3 mg/dL Final   01/13/2021 0.8 0.2 - 1.3 mg/dL Final     Alkaline Phosphatase   Date Value Ref Range Status   04/25/2022 64 40 - 150 U/L Final    01/13/2021 72 40 - 150 U/L Final     ALT   Date Value Ref Range Status   04/25/2022 21 0 - 50 U/L Final   01/13/2021 17 0 - 50 U/L Final     AST   Date Value Ref Range Status   04/25/2022 11 0 - 45 U/L Final   01/13/2021 13 0 - 45 U/L Final     Review of Systems:  10 systems (general, cardiovascular, respiratory, eyes, ENT, endocrine, GI, , M/S, neurological) were reviewed. Most pertinent finding(s) is/are: Some chronic pains, see HPI above. The remaining systems are all unremarkable.    Mental Status Examination (limited as this is by phone/video):  Appearance: Awake, alert, appears stated age, no acute distress, well-groomed   Attitude:  cooperative, pleasant   Motor: No gross abnormalities noted today.  Not formally tested  Oriented to:  person, place, time, and situation  Attention Span and Concentration:  normal  Speech:  Still a little delayed/hesitant responses-seems to continue to improve, normal volume  Language: intact  Mood: Pretty good  Affect: A little restricted still but overall mood congruent  Associations:  no loose associations  Thought Process:  logical, linear and goal oriented  Thought Content:  no evidence of suicidal ideation or homicidal ideation, no evidence of psychotic thought, no auditory hallucinations present and no visual hallucinations present  Recent and Remote Memory:  Not formally assessed. Possibly some deficits? Could consider neuropsychological testing in future  Fund of Knowledge: appropriate  Insight: Good  Judgment:  intact, adequate for safety  Impulse Control:  intact    Suicide Risk Assessment:  Today Jolynn Haji reports no suicidal ideation. Based on all available evidence including the factors cited above, Jolynn Haji does not appear to be at imminent risk for self-harm, does not meet criteria for a 72-hr hold, and therefore remains appropriate for ongoing outpatient level of care.  A thorough assessment of risk factors related to suicide and self-harm have been  reviewed and are noted above. The patient convincingly denies suicidality on several occasions. Local community safety resources reviewed for patient to use if needed. There was no deceit detected, and the patient presented in a manner that was believable.     DSM5 Diagnosis:  300.02 (F41.1) Generalized Anxiety Disorder   R/O Mood Disorder  Insomnia, unspecified  H/O TBI    Suspected drug-induced parkinsonism  R/O Underlying Parkinson's Disease (pt working with neurology)    Medical comorbidities include:   Patient Active Problem List    Diagnosis Date Noted     Essential hypertension with goal blood pressure less than 140/90 07/16/2001     Priority: High     Drug-induced parkinsonism (H)- Abilify - generic = ARIPiprazole( 5mg)  01/02/2023     Priority: Medium     Mood disorder (H) 01/02/2023     Priority: Medium     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  07/07/2022     Priority: Medium     Adjustment insomnia 12/23/2021     Priority: Medium     Screening for cervical cancer      Priority: Medium     1999 NIL  2000 ASCUS-H.  >> Colpo: Neg  2001 NIL   2003 NIL pap, Neg HPV  [id number], 2016  NIL pap  7/25/19 NIL pap, Neg HPV    Criteria necessary to stop screening per ASCCP guidelines:  Older than 65 years  Three consecutive negative cytology results or two consecutive negative cotest results within the previous 10 years, with the most recent being performed within the last 5 years.   (Women with hx of MADDIE 2 or 3, or adenocarcinoma in situ should continue screening for full 20 years, even if this goes past age 65).         Hyperlipidemia LDL goal <130 11/30/2018     Priority: Medium     Basal cell carcinoma      Priority: Medium     Colon polyps 05/01/2018     Priority: Medium     Primary localized osteoarthrosis of left lower leg 02/19/2018     Priority: Medium     Other secondary osteoarthritis of left knee 01/29/2018     Priority: Medium     Chronic  constipation 09/14/2017     Priority: Medium     Raynaud's phenomenon without gangrene 09/14/2017     Priority: Medium     Insomnia, unspecified type 07/25/2017     Priority: Medium     Generalized anxiety disorder 05/23/2017     Priority: Medium     Adjustment disorder with mixed anxiety and depressed mood- mild-moderate  at this time 05/11/2017     Priority: Medium     Family history of celiac disease 05/11/2017     Priority: Medium     Basal cell carcinoma of leg, right 05/01/2016     Priority: Medium     Sun-damaged skin 04/21/2016     Priority: Medium     Seasonal allergic rhinitis 04/21/2016     Priority: Medium     Dupuytren's disease of palm - right 4th digit flexor tendon 06/11/2015     Priority: Medium     History of migraine headaches- assoc. with nausea/vomiting - resolved around menopause - were  premenstrual  01/08/2014     Priority: Medium     Rosacea 05/01/2013     Priority: Medium     Numbness of feet- distal feet R>L  02/21/2011     Priority: Medium     NUMBNESS AND TINGLING OF FOOT - bilateral -mild  08/25/2010     Priority: Medium     Osteopenia 01/02/2009     Priority: Medium     Lipidosis 10/24/2006     Priority: Medium     Problem list name updated by automated process. Provider to review       Localized osteoarthritis of hand 11/01/2005     Priority: Medium     Problem list name updated by automated process. Provider to review       Stress incontinence, female - mild 10/23/2012     Priority: Low     Cough 10/23/2012     Priority: Low     Acute bronchitis- recurrent- ? related to mold in school building- pt has since retired and no further bronchitis 10/23/2012     Priority: Low     Postmenopausal      Priority: Low     DERMATOPHYTOSIS OF NAIL- toenails  03/14/2006     Priority: Low     trichophyton rubrum on culture - 1/06        Synovial cyst of popliteal space      Priority: Low     Hidradenitis 12/05/2003     Priority: Low       Psychosocial & Contextual Factors: see HPI  above    Assessment:  From Intake, 5/12/2022:  Jolynn Haji is a 66-year-old female with a history including anxiety, depression, insomnia status post concussion in April 2017.  No major mental health struggles prior to concussion in 2017.  Patient was started on Lexapro, quetiapine, mirtazapine to help with her symptoms.  Patient had been feeling much better on that medication combination and once she was sleeping well again quetiapine and mirtazapine were discontinued.  This past January the patient started to have some worsening symptoms and Abilify was added.  Patient has felt like her current medications of Lexapro and Abilify have been helpful but reports starting to feel really slowed and numb the past several weeks.  She also reported a weight loss of nearly 20 pounds over the last 6 weeks versus several months (patient was not very sure). I am wondering if she could be experiencing side effects of her increased Lexapro dose.  She is agreeable to decreasing her dose slightly to 15 mg daily.  Could also consider decreasing Abilify in the case it is not her Lexapro.  She also desires to sleep a little bit better than she has been.  Could consider restarting mirtazapine at bedtime for sleep and to also augment her Lexapro.  Could also be beneficial for patient to undergo cognitive screening to check in and see what her baseline is status post concussion.  No acute safety concerns.  No SI.  No problematic drug or alcohol use.    5/24/2022:  Patient with some slight improvements of possible medication related side effects after decreasing Lexapro.  I am wondering if there might be more robust improvements with decreasing Abilify/aripiprazole.  Possible drug-induced Parkinsonism?  She does seem to have delayed/slowed speech, masked facies.  Her gait and other movements unable to be observed but she does report other struggles that may be consistent with such a presentation.  We are going to decrease her Abilify  down to 2 mg daily and I will see her back in just a few weeks.  We will likely discontinue her Abilify at her next visit.  I will message her primary care provider to get some collateral regarding previous baseline functioning prior to starting Abilify.  History of TBI could be confounding presentation.  Could consider neurology consult.    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  Instructed to stop Abilify today.  We will monitor for improvement of symptoms.  If symptoms do not improve may need to refer to neurology.  If symptoms start to improve we will consider low-dose methylphenidate augmentation of Lexapro next visit to help with low energy, mood, focus and concentration, cognition, s/p TBI if still clinically indicated.  We would discuss risks and benefits with patient and her daughter at that time.  No other acute safety concerns or SI.  No problematic drug or alcohol use.  If patient starts to struggle with sleep would restart mirtazapine at bedtime.    7/28/2022:  Pt still with sxs consistent with drug-induced Parksinsonism.  Neurology consult will be placed so that patient might get scheduled sooner than later due to the typically long wait.  We will start Ambien in hopes we can get her sleeping much better.  Discussed risks and benefits of its use, particularly in the geriatric population.  Lexapro has not been as helpful as it helped for her anxiety.  We will taper this medication and start Effexor/venlafaxine in hopes that might be more effective for her symptoms.  No acute safety concerns.  No SI.  No problematic drug or alcohol use.    8/11/2022:  Overall with continued improved symptoms.  Hopefully patient will continue to have improvement of her symptoms off of Abilify.  Patient will be seeing neurology soon.  Encouraged to keep appointment.  We will continue taper off of Lexapro and continue on monotherapy with venlafaxine.  We will also continue Ambien at this  time for sleep since patient is sleeping much better on the medication with improved symptoms and tolerating well.  No acute safety concerns.  No problematic drug or alcohol use.    9/1/2022:  Ongoing anxiety, still intense at times.  Still not sleeping very well.  Could consider sleep evaluation, but patient quite overwhelmed right now with various referrals and doctors appointments.  Briseyda had been working quite well but not keeping her sleep now.  Still falling asleep relatively well.  We will transition to Ambien CR to see if that is more helpful.  We will continue to consider sleep evaluation to rule out possibility of REM behavior sleep disorder.  We will also transition venlafaxine to bedtime to see if that helps with any possible nausea.  Working with neurology to monitor for Parkinson's disease.  I do recommend neuropsychological testing due to ongoing possible cognitive difficulties/struggles.  No acute safety concerns.  No SI.  No problematic drug or alcohol use.    10/3/2022:  Patient overall with some ongoing anxiety but slowly improving since it got worse late August.  Recommend continuing to optimize Effexor-XR, especially since patient tolerating well.  No longer having any nausea now that dose is taken at bedtime.  Patient feeling a little foggy and off balance with Ambien CR and so we will switch back to Ambien immediate release.  No falls.  Recommend increasing Effexor-XR first and once well tolerated switching back to Ambien immediate release.  May need to continue to work on alternative options for sleep if does not do well back on Ambien immediate release.  No acute safety concerns.  No SI.  No problematic drug or alcohol use.  Encouraged to continue working with neurology.  Next appointment in November.    10/24/2022:  Patient overall with some improved anxiety on increased dose of Effexor-XR.  Could use additional improvement of anxiety and so we will continue to optimize therapy with  Effexor-XR.  We will increase dose to 150 mg daily.  Tolerating well with no notable negative side effects.  Patient transitioned back to Ambien CR and is sleeping better on this compared to Ambien immediate release.  We will continue Ambien CR for sleep.  Discussed possibility of sleep medicine referral but patient declines at this time.  Could consider in the future.  No acute safety concerns.  No SI.  No problematic drug or alcohol use.  Encouraged to continue working with neurology.    12/09/2022:  Patient overall doing fairly well.  Feels like increased dose of venlafaxine has been helpful.  I do wonder if she is having some subtle withdrawal symptoms late in the afternoon with headache and fatigue.  Discussed shifting her dose to the morning.  However, she reports she will try to drink more water and see if that is helpful before trying to make changes to her medication.  She feels like things are going well enough that she really does not want to change anything.  Encouraged to continue in therapy.  No acute safety concerns.  No SI.  No problematic drug or alcohol use.    1/19/2023:  Patient overall doing well with ongoing improvements.  Patient even recently starting to drive again which is a big deal.  She continues in therapy and finds it helpful.  Discussed possible changes to her dosing to see if headaches might improve, along with fatigue.  Discussed possibly splitting her Effexor-XR dose as 75 mg twice daily versus 37 point 5 in the morning and 112.5 mg at bedtime.  Patient feels like she is in a good place right now and really does not want to make any medication changes if possible.  We will keep things the same for now.  Encouraged to drink lots of water.  No acute safety concerns.  No SI.  No problematic drug or alcohol use.    Medication side effects and alternatives were reviewed. Health promotion activities recommended and reviewed today. All questions addressed. Education and counseling  completed regarding risks and benefits of medications and psychotherapy options. Recommend therapy for additional support.     Treatment Plan:    Continue Ambien CR 6.25 mg at bedtime as needed for sleep. Do not take with alcohol or opioid pain medication. Make sure to have 8-10 hours to devote to sleep after taking. Careful/caution for falls in middle of night. Discussed sleep medicine referral previously but patient has declined-could consider in future.     Continue Effexor-XR/venlafaxine  mg daily for depression and anxiety.     Continue to follow with neurology as they recommend.      Continue all other cares per primary care provider.     Continue all other medications as reviewed per electronic medical record today.     Safety plan reviewed. To the Emergency Department as needed or call after hours crisis line at [phone] or [phone]. [place] Crisis Text Line. Text [place] to 636560 or Suicide LifeLine Chat: suicidepreventionAxikin Pharmaceuticalsline.org/chat    Continue individual psychotherapy for additional support and ongoing development of nonpharmacologic coping skills and strategies.    Schedule an appointment with me in 6 weeks or sooner as needed. Call [facility] at [phone] to schedule.    Follow up with primary care provider as planned or for acute medical concerns.    Call the psychiatric nurse line with medication questions or concerns at [phone].    [email or web address] may be used to communicate with your provider, but this is not intended to be used for emergencies.    Administrative Billing:   Phone Call/Video Duration: 16 Minutes  Start: 11:11a  Stop: 11:27a    Patient Status:  Patient will continue to be seen for ongoing consultation and stabilization.    Signed:   Judy Pompa DO  [facility]iat[facility]    Disclaimer: This note consists of symbols derived from keyboarding, dictation and/or voice recognition software. As a result, there may be errors in the script that have gone  undetected. Please consider this when interpreting information found in this chart.

## 2023-01-20 NOTE — PATIENT INSTRUCTIONS
Treatment Plan:  Continue Ambien CR 6.25 mg at bedtime as needed for sleep. Do not take with alcohol or opioid pain medication. Make sure to have 8-10 hours to devote to sleep after taking. Careful/caution for falls in middle of night. Discussed sleep medicine referral previously but patient has declined-could consider in future.   Continue Effexor-XR/venlafaxine  mg daily for depression and anxiety.   Continue to follow with neurology as they recommend.    Continue all other cares per primary care provider.   Continue all other medications as reviewed per electronic medical record today.   Safety plan reviewed. To the Emergency Department as needed or call after hours crisis line at [phone] or [phone]. [place] Crisis Text Line. Text [place] to 003672 or Suicide LifeLine Chat: suicidepreventionlifeline.org/chat  Continue individual psychotherapy for additional support and ongoing development of nonpharmacologic coping skills and strategies.  Schedule an appointment with me in 6 weeks or sooner as needed. Call [facility] at [phone] to schedule.  Follow up with primary care provider as planned or for acute medical concerns.  Call the psychiatric nurse line with medication questions or concerns at [phone].  MyChart may be used to communicate with your provider, but this is not intended to be used for emergencies.

## 2023-01-22 PROBLEM — R41.843: Status: ACTIVE | Noted: 2022-07-07

## 2023-01-23 ENCOUNTER — VIRTUAL VISIT (OUTPATIENT)
Dept: BEHAVIORAL HEALTH | Facility: CLINIC | Age: 68
End: 2023-01-23
Payer: COMMERCIAL

## 2023-01-23 DIAGNOSIS — G47.00 INSOMNIA, UNSPECIFIED TYPE: ICD-10-CM

## 2023-01-23 DIAGNOSIS — F39 MOOD DISORDER (H): ICD-10-CM

## 2023-01-23 DIAGNOSIS — F41.1 GENERALIZED ANXIETY DISORDER: Primary | ICD-10-CM

## 2023-01-23 PROCEDURE — 90834 PSYTX W PT 45 MINUTES: CPT | Mod: 95 | Performed by: SOCIAL WORKER

## 2023-01-23 NOTE — PROGRESS NOTES
[facility]  January 23, 2023      Behavioral Health Clinician Progress Note    Patient Name: Jolynn Haji           Service Type:  Individual      Service Location:   MyChart / Email (patient reached)     Session Start Time: 10:00 am  Session End Time: 10:45 am      Session Length: 53 - 60      Attendees: Patient     Service Modality:  Video Visit:      Provider verified identity through the following two step process.  Patient provided:  Patient is known previously to provider    Telemedicine Visit: The patient's condition can be safely assessed and treated via synchronous audio and visual telemedicine encounter.      Reason for Telemedicine Visit: Patient has requested telehealth visit    Originating Site (Patient Location): Patient's home    Distant Site (Provider Location): [facility]    Consent:  The patient/guardian has verbally consented to: the potential risks and benefits of telemedicine (video visit) versus in person care; bill my insurance or make self-payment for services provided; and responsibility for payment of non-covered services.     Patient would like the video invitation sent by:  My Chart    Mode of Communication:  Video Conference via Amwell    As the provider I attest to compliance with applicable laws and regulations related to telemedicine.    Visit Activities (Refresh list every visit): [place] [facility] Only    Diagnostic Assessment Date: 9/27/22  Treatment Plan Review Date: 4/13/23  See Flowsheets for today's PHQ-9 and TIFFANY-7 results  Previous PHQ-9:   PHQ-9 SCORE 10/28/2022 12/15/2022 1/13/2023   PHQ-9 Total Score - - -   [facility] 5 (Mild depression) 4 (Minimal depression) 2 (Minimal depression)   PHQ-9 Total Score 5 4 2   Some encounter information is confidential and restricted. Go to Review Flowsheets activity to see all data.     Previous TIFFANY-7:   TIFFANY-7 SCORE 12/6/2022 1/12/2023 1/13/2023    Total Score - - -   Total Score 3 (minimal anxiety) 3 (minimal anxiety) 1 (minimal anxiety)   Total Score 3 3 1       KARINA LEVEL:  KARINA Score (Last Two) 8/27/2010 2/22/2011   KARINA Raw Score 51 49   Activation Score 91.6 82.8   KARINA Level 4 4       DATA  Extended Session (60+ minutes): No  Interactive Complexity: No  Crisis: No  [facility] Patient: No    Treatment Objective(s) Addressed in This Session:  Target Behavior(s): anxiety and depression    Depressed Mood: Increase interest, engagement, and pleasure in doing things  Decrease frequency and intensity of feeling down, depressed, hopeless  Improve quantity and quality of night time sleep / decrease daytime naps  Feel less tired and more energy during the day   Improve diet, appetite, mindful eating, and / or meal planning  Identify negative self-talk and behaviors: challenge core beliefs, myths, and actions  Improve concentration, focus, and mindfulness in daily activities   Feel less fidgety, restless or slow in daily activities / interpersonal interactions  Anxiety: will experience a reduction in anxiety, will develop more effective coping skills to manage anxiety symptoms, will develop healthy cognitive patterns and beliefs and will increase ability to function adaptively    Current Stressors / Issues:  Reports she is doing really well. Reports she will be driving up to see her grandson. She is highly looking forward to this and making [employer] cards. Some anxiety with going in to the grocery store but not once she gets started shopping. Reports her ultimate goal would be to go to the mall alone with traffic as that has been causing anxiety. Headaches continue to be of concern to her. Discussed further and identified them coming following either a panic attack or after high anxiety. Discussed using sensory tech for elevating the anxiety and also for following headache such as heating pad for neck or eyes. Discussed her conversation with Dr. Pompa about possibly  changing meds to help with headaches. She reports relishing in current progress and is not wanting meds to change at this time. Headaches do interfere sometimes with daily life. She will be going to lunch with several college friends. This has caused a bit of anxiety in accepting the invite. Normalized experience for pt.     1/19/22 ([facility] appt Erika Francisco MSW LICSW)  Reports she continues to explore driving more. It has been going really well. Denies anxiety sx associated with driving. Anxiety continues generally in the evening hours. Noticed distraction has been really helpful. Continues utilizing tech discussed in therapy. Has been working out more at work out facility with friends. Discussed changing perspectives when when feeling anxious. Continues to have headaches. Sleep continues to be better around 6.5-7hrs. Often wonders how she is suppose to feel on the medications, not feeling fully herself. Talked about possibly reducing medications taking. Reports more good days than bad days in the past 2-3 weeks. Discussed not having the same energy level she had before.      1/13/23  Reports she is doing pretty well. Reports she was wanting to try and push herself in working towards driving the car again. Reports she sat in the car first and it went well, then she pushed herself gradually to the point of driving to see a friend 16 mi away. Reports experience went really well for her and she plans to continue to drive other places such as the doctor this afternoon. Endorses heightened anxiety at the time of driving but reports no panic attacks. She has been very pleased and plans to continue to explore other areas of her life to push self. Endorses continued headaches despite increasing fluid intake, drinking one Liquid VI electrolyte packets a day and eating when headache comes on. Discussed significant diminish is abrupt short thoughts of SI (hardly ever happening anymore). Discussed finding herself and finding  "things in which bring her enjoyment in life. Reports her grandchildren as being at the top of the list. She continues to play cards, attend J[employer]ism, garden, clean and has volunteered in the past. Pt reports she is feeling better but not herself since being on meds. She discussed being on meds and what her plan would be long term. Encouraged pt to discuss with PCP and Psychiatrist further. Normalized her feelings about feeling better and pondering if meds are needed anymore. Advised her to not stop any medications without MD approval. Pt expresses understanding and will not alter medications on her own.       1/4/23  Pt reports she has been doing pretty well despite having recent COVID. She enjoyed her holidays and was able to work through high anxiety when an unexpected situation arose. She has been feeling more motivated to get [employer] decor put away. She has been sleeping roughly 7 hours at night. She notes feeling really well after sleeping that much. States increase in memory when she has better sleep. Pt continues to work on tech related to decreasing anxiety.     12/20/22  Says that she is doing pretty good with [place] right around the corner. She has been proactive in her planning to decrease stress and anxiety. Reports consistent 6.5 hours of sleep on average. She would like to ideally get 8-9 hours of sleep a night. Discussed talking with Dr. Pompa again about the benefits of a sleep study at her next appt. Pt continues to utilize her breathing and relaxation tech. Reports she feels she is \"maintaining\" in regards to progress with anxiety which is her goal for the holiday season. Discussed the importance of remaining active, socially engaged, and exploring anxiety provoking activities. She is reading a book recommended by [facility] and has been open to concepts the book has been offered.     12/14/22  Met with pt this morning. Continues to endorse daily anxiety but is feeling more in control of her responses " to it. Discussed appt with Dr. Pompa. Reports she has been she has been drinking more water to help with headaches. Has noted some improvement with headaches. Continues to have afternoon slump. She has been using the Calm lorena prior to her anxiety that comes around 4:30. Discussed racing heart rate when anxious. Normalized her experience and discussed nature of anxiety and panic attacks. Discussed an event when she noticed she was having increased anxiety baking cookies for the holidays. Explored internal dialog she has with herself in those moments. She was able to continue to move through with baking despite the anxiety. Reports she was able to go to [employer] this past Sunday and enjoyed self. Discussed knowing her limits and pushing self into more anxious situations. Worked on inner dialog in those events. Discussed staying in the present as much as possibe. Recommended some reading on Anxiety and Panic to gain more understanding of what is happing to her when feeling more anxious.       12/7/22  Met with pt this morning, reports she is pretty good. She continues to work on breathing techs. She reports around 5pm is when she is needing to use it. Tends to be more anxious around 4:30-5:30pm. Then anxiety resolves and she can proceed with her evening. Reports she has been socializing with her friends for lunch and [employer] shopping. Reports some anxiety about in socializing but has seen a great amount of improvement. She has been attending [employer] frequently however a headache has been interfering. She wants to discuss headaches and a slump time in the afternoon with Dr. Pompa on Friday. Discussed inflexibility and working on allowing for change in her plans. Discussed rigidness to her scheduling.      11/29/22  Met with pt this morning. Reports some anxiety with logging on to the appt. Reports Thanksgiving went really well despite making changes to the normal traditions. States that she also had family over for her  husbands birthday and cooked dinner for everyone. She has not done this in many months and she stated it felt really good to do. Reports some anxiety around [employer] shopping. As an alternative she has been ordering gifts online this year. Sleep continues to improve. States she sleep 7 hours last night. She continues to attend to self talk. Discussed check ins with self to assess level of anxiety.       11/15/22  Met with pt today for follow-up. She reports doing really well. She went out to dinner with family. She really enjoyed herself at dinner which is a difference from several months ago. She is really enjoying spending time with her grand children. On Sunday she drove on her own from the first time in a very long time. [facility] validated and praised pts efforts. Reports still is having some general anxiety through out the day. She is looking forward to a sleep over with her julio. Discussed finding ways to access the comforts of meditation on the go. Discussed the up coming holidays and the work she is doing to avoid negative impact on her mental health. Reports having headaches consistently and an afternoon slump/tiredness.     11/9/22  Met with pt this morning. Report 6-7 hours of sleep which is an improvement from 3-4 hours. Reports the daily anxiety has greatly improved. States her anxiety is about half the amount that it was since last. She has been engaging socially. States she even has begun to attend [employer] again. She has been active in acknowledging the anxiety more objectively. She continues with her exercises. She has not had as much fog in the afternoon. Reinforced the need to continue practicing the tools she finds helpful from therapy. Normalized setbacks. Discussed SI from last session. Reports she has noticed them decreasing. Continues to feel the thoughts are highly unwanted and not matching how she feels. Denies plans or intentions.         10/31/22  Met with pt this morning. She states  "she is doing well today. Reports going to bed a bit later to possibly avoid such an early waking. Anxiety has been somewhat improved since last visit. Continues to have some physical sx such as chest tightening. Continues to have nervous feeling through the day. Continues to have anxiety when doing things socially. She has been writing a journal with a gratitude list. Exercising daily with walking the dog. Using the Calm lorena for meditation and imagery. She is working on acknowledging the anxiety and attempting to not  it. Unexpected events or plans tend to bring up a lot of anxiety. Discussed anxiety about planning for Thanksgiving. She is not feeling up to planning and doing the prep. She is having trouble with frustration with self. Discussed acceptance around the progress of her mental health and listening to family who are offering her a lot of support.     Pt brought up the reoccurring thoughts she has been having where she thinks to herself \"I wish I were dead.\" This thought comes automatically when she is feeling not up for doing something. States this is not a true feeling of hers but the thought continues to enter her mind. She is unnerved by this at times. She denies plan or intention. She is concerned it could be medication related as she noticed it start around the time of starting meds for anxiety. She describes these thoughts as fleeting and easily controlled. [facility] communicated this information with Dr. Pompa, pts psychiatrist. Dr. Pompa will touch base with pt via ScreenHits.       10/14/22  Met with pt this morning who is reporting anxiety has improved a bit since last visit. States she has been active in body scanning, incorporating meditation back into her life and use of imagery. Discussed continued engagement in anxiety provoking situations. Discussed attempting to observe anxiety in a non- mental and objective manner. States people around her have noticed an improvement in her sx however " "she continues to feel \"like a wreck inside.\" Finally, discussed examining situations in which she is attempting to have control over. Worked on identifying areas in which she has no or little control. Discussed attempts to let go. Does report some issues with sleep on fast release Ambien. Pt has follow up appt with Dr. Pompa in just over a week. She has been taking long release Ambien which allows her to sleep longer throughout the night. Encouraged her to reach out to provider prior to appt if issues worsen.     10/5/22  Reports doing well today. She has been trying to remain social. She tends to have an initial panic stricken feeling when committing to something with others. Since last med adjustment appears more calmer. Discussed areas in her life which appear to cause most of her anxiety. One area is around situations she has little control over, such as the result of her mental health. Discussed focus on acceptance of current state of mental health and focus on things she does have control over such as active steps she is taking to help her mental health. Discussed deep breathing exercise as a way of calming self with highly anxious. Introduced progressive muscle relaxation. Briefly discussed getting into a practice of meditation again. Finally, discussed finding frequent exercise as a part of anxiety. This is a bit more difficult as she has a bad knee which has been limiting for her. Pt is committed to trying out tech. Prior to next session.        9/27/22 DA  Met with pt for initial assessment. Reports several years ago she had a significant head injury which resulted in her inability to sleep. She reports anxiety followed shortly after. Since April of this year she has been experiencing more depressive sx. Her anxiety is mostly around wanting to feel better from her anxiety and not having positive results. Some of the depressive sx she has been having is poor appetite, lack of interest, struggle with focus, " low energy, and has had on very rare occasions fleeting thoughts of wishing she were dead to end the pain of depression. Her anxiety sx often are in the form of nervousness, restlessness, and rumination. She used to have panic attacks each morning, however this has subsided for several months now. She has not attempted therapy before. She would like to gain skills relating to managing anxiety. She currently is working with Dr. Pompa for medications.     Progress on Treatment Objective(s) / Homework:  Satisfactory progress - ACTION (Actively working towards change); Intervened by reinforcing change plan / affirming steps taken    Motivational Interviewing    MI Intervention: Expressed Empathy/Understanding, Supported Autonomy, Collaboration, Evocation, Open-ended questions, Change talk (evoked) and Reframe     Change Talk Expressed by the Patient: Desire to change Ability to change Reasons to change Need to change    Provider Response to Change Talk: E - Evoked more info from patient about behavior change, A - Affirmed patient's thoughts, decisions, or attempts at behavior change, R - Reflected patient's change talk and S - Summarized patient's change talk statements      Care Plan review completed: Yes    Medication Review:  No changes to current psychiatric medication(s)    Medication Compliance:  Yes    Changes in Health Issues:   None reported    Chemical Use Review:   Substance Use: Chemical use reviewed, no active concerns identified      Tobacco Use: No current tobacco use.      Assessment: Current Emotional / Mental Status (status of significant symptoms):  Risk status (Self / Other harm or suicidal ideation)  Patient denies a history of suicidal ideation, suicide attempts, self-injurious behavior, homicidal ideation, homicidal behavior and and other safety concerns  Patient denies current fears or concerns for personal safety.  Patient denies current or recent suicidal ideation or behaviors.  Patient denies  current or recent homicidal ideation or behaviors.  Patient denies current or recent self injurious behavior or ideation.  Patient denies other safety concerns.  A safety and risk management plan has not been developed at this time, however patient was encouraged to call [place] [phone] should there be a change in any of these risk factors.    Appearance:   Appropriate   Eye Contact:   Good   Psychomotor Behavior: Normal   Attitude:   Cooperative   Orientation:   All  Speech   Rate / Production: Normal    Volume:  Normal   Mood:    Anxious   Affect:    Appropriate   Thought Content:  Clear   Thought Form:  Coherent  Logical   Insight:    Good     Diagnoses:  1. Generalized anxiety disorder    2. Insomnia, unspecified type    3. Mood disorder (H)        Collateral Reports Completed:  Not Applicable    Plan: (Homework, other):  Patient was given information about behavioral services and encouraged to schedule a follow up appointment with the clinic [facility] in 1 week.  She was also given information about mental health symptoms and treatment options .  CD Recommendations: No indications of CD issues.  Erika Francisco MS LICSW      ______________________________________________________________________    Integrated Primary Care Behavioral Health Treatment Plan    Patient's Name: Jolynn Haji  YOB: 1955    Date of Creation: 10/5/22  Date Treatment Plan Last Reviewed/Revised: 1/13/23    DSM5 Diagnoses: 300.02 (F41.1) Generalized Anxiety Disorder  Psychosocial / Contextual Factors: Individual Factors high anxiety associated with depressive sx, interfereing with ability to function as she would like.  .  PROMIS (reviewed every 90 days):     Referral / Collaboration:  Was/were discussed and patient will pursue. - individual therapy    Anticipated number of session for this episode of care: 5-8 sessions  Anticipation frequency of session: Weekly  Anticipated Duration of each session: 38-52 minutes  Treatment plan  will be reviewed in 90 days or when goals have been changed.       MeasurableTreatment Goal(s) related to diagnosis / functional impairment(s)  Goal 1: Patient will work with provider to manage symptoms    I will know I've met my goal when when less anxious and feeling down.      Objective #A (Patient Action)    Patient will  attend all sessions.  Status: Continued - Date(s):1/13/23     Intervention(s)  Therapist will teach educate patient on treatment options, clarify concerns, work with pt to overcome any resistance to compliance..    Objective #B  Patient will identify 3 fears / thoughts that contribute to feeling anxious.  Status: Continued - Date(s):1/13/23     Intervention(s)  Therapist will educate patient on treatment options, clarify concerns, work with pt to overcome any resistance to compliance..    Objective #C  Patient will identify 3 initial signs or symptoms of anxiety.  Status: Continued - Date(s):1/13/23     Intervention(s)  Therapist will educate patient on treatment options, clarify concerns, work with pt to overcome any resistance to compliance..        Patient has reviewed and agreed to the above plan.      DINESH Whipple  January 23, 2023  Answers for HPI/ROS submitted by the patient on 12/6/2022  TIFFANY 7 TOTAL SCORE: 3

## 2023-01-31 ASSESSMENT — ENCOUNTER SYMPTOMS
DIARRHEA: 0
HEARTBURN: 0
DYSURIA: 0
CONSTIPATION: 0
BREAST MASS: 0
MYALGIAS: 0
JOINT SWELLING: 0
HEMATOCHEZIA: 0
HEADACHES: 1
DIZZINESS: 1
SHORTNESS OF BREATH: 0
COUGH: 0
PALPITATIONS: 0
WEAKNESS: 0
CHILLS: 0
PARESTHESIAS: 0
ARTHRALGIAS: 0
SORE THROAT: 0
EYE PAIN: 0
ABDOMINAL PAIN: 0
FEVER: 0
NAUSEA: 0
HEMATURIA: 0
FREQUENCY: 0
NERVOUS/ANXIOUS: 1

## 2023-01-31 ASSESSMENT — ACTIVITIES OF DAILY LIVING (ADL): CURRENT_FUNCTION: NO ASSISTANCE NEEDED

## 2023-01-31 NOTE — PROGRESS NOTES
[facility]  February 1, 2023      Behavioral Health Clinician Progress Note    Patient Name: Jolynn Haji           Service Type:  Individual      Service Location:   MyChart / Email (patient reached)     Session Start Time: 9:58 am  Session End Time: 10:43 am      Session Length: 38 - 52      Attendees: Patient     Service Modality:  Video Visit:      Provider verified identity through the following two step process.  Patient provided:  Patient is known previously to provider    Telemedicine Visit: The patient's condition can be safely assessed and treated via synchronous audio and visual telemedicine encounter.      Reason for Telemedicine Visit: Patient has requested telehealth visit    Originating Site (Patient Location): Patient's home    Distant Site (Provider Location): [facility]    Consent:  The patient/guardian has verbally consented to: the potential risks and benefits of telemedicine (video visit) versus in person care; bill my insurance or make self-payment for services provided; and responsibility for payment of non-covered services.     Patient would like the video invitation sent by:  My Chart    Mode of Communication:  Video Conference via [employer]    As the provider I attest to compliance with applicable laws and regulations related to telemedicine.    Visit Activities (Refresh list every visit): [facility] [place] Only    Diagnostic Assessment Date: 9/27/22  Treatment Plan Review Date: 4/13/23  See Flowsheets for today's PHQ-9 and TIFFANY-7 results  Previous PHQ-9:   PHQ-9 SCORE 10/28/2022 12/15/2022 1/13/2023   PHQ-9 Total Score - - -   PHQ-9 Total Score [facility] 5 (Mild depression) 4 (Minimal depression) 2 (Minimal depression)   PHQ-9 Total Score 5 4 2   Some encounter information is confidential and restricted. Go to Review Flowsheets activity to see all data.     Previous TIFFANY-7:   TIFFANY-7 SCORE 12/6/2022 1/12/2023 1/13/2023    Total Score - - -   Total Score 3 (minimal anxiety) 3 (minimal anxiety) 1 (minimal anxiety)   Total Score 3 3 1       KARINA LEVEL:  KARINA Score (Last Two) 8/27/2010 2/22/2011   KARINA Raw Score 51 49   Activation Score 91.6 82.8   KARINA Level 4 4       DATA  Extended Session (60+ minutes): No  Interactive Complexity: No  Crisis: No  [facility] Patient: No    Treatment Objective(s) Addressed in This Session:  Target Behavior(s): anxiety and depression    Depressed Mood: Increase interest, engagement, and pleasure in doing things  Decrease frequency and intensity of feeling down, depressed, hopeless  Improve quantity and quality of night time sleep / decrease daytime naps  Feel less tired and more energy during the day   Improve diet, appetite, mindful eating, and / or meal planning  Identify negative self-talk and behaviors: challenge core beliefs, myths, and actions  Improve concentration, focus, and mindfulness in daily activities   Feel less fidgety, restless or slow in daily activities / interpersonal interactions  Anxiety: will experience a reduction in anxiety, will develop more effective coping skills to manage anxiety symptoms, will develop healthy cognitive patterns and beliefs and will increase ability to function adaptively    Current Stressors / Issues:  Pt is reporting she is doing well. She has been out driving to do shopping and seeing friends. Continues to have improvement with anxiety each day. Continues to have headaches but is wanting to change medications until she gets back from trip to her sisters home. Having some anxiety about spending over night for the first time since COVID. Discussed steps to addressing anxiety at her sisters home and drive up. 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 Became dizzy and become worried about the physical sx she was experiencing. Anxiety grew from that point on. Discussed how she handled it in the moment with redirecting thoughts,  accepting anxiety and attempting some of the anxiety tech discussed. Pt will explore other situations in which anxiety tends to arise for next session. Pt has been keeping a journal of her daily experience and will reexamine journal to better understand anxiety. Pt listed several situations she knows brings anxiety.      Identified Triggers:  Feeling rushed  Not wanting to be late  Having others do responsibilities she once carried as her responsibility  Being flexible  Expectations of others  What do others see/think of her when experiencing panic attack    1/23/23  Reports she is doing really well. Reports she will be driving up to see her grandson. She is highly looking forward to this and making [employer] cards. Some anxiety with going in to the grocery store but not once she gets started shopping. Reports her ultimate goal would be to go to the mall alone with traffic as that has been causing anxiety. Headaches continue to be of concern to her. Discussed further and identified them coming following either a panic attack or after high anxiety. Discussed using sensory tech for elevating the anxiety and also for following headache such as heating pad for neck or eyes. Discussed her conversation with Dr. Pompa about possibly changing meds to help with headaches. She reports relishing in current progress and is not wanting meds to change at this time. Headaches do interfere sometimes with daily life. She will be going to lunch with several college friends. This has caused a bit of anxiety in accepting the invite. Normalized experience for pt.     1/19/22 ([facility] appt Erika Francisco MSW LICSW)  Reports she continues to explore driving more. It has been going really well. Denies anxiety sx associated with driving. Anxiety continues generally in the evening hours. Noticed distraction has been really helpful. Continues utilizing tech discussed in therapy. Has been working out more at work out facility with friends.  Discussed changing perspectives when when feeling anxious. Continues to have headaches. Sleep continues to be better around 6.5-7hrs. Often wonders how she is suppose to feel on the medications, not feeling fully herself. Talked about possibly reducing medications taking. Reports more good days than bad days in the past 2-3 weeks. Discussed not having the same energy level she had before.      1/13/23  Reports she is doing pretty well. Reports she was wanting to try and push herself in working towards driving the car again. Reports she sat in the car first and it went well, then she pushed herself gradually to the point of driving to see a friend 16 mi away. Reports experience went really well for her and she plans to continue to drive other places such as the doctor this afternoon. Endorses heightened anxiety at the time of driving but reports no panic attacks. She has been very pleased and plans to continue to explore other areas of her life to push self. Endorses continued headaches despite increasing fluid intake, drinking one Liquid VI electrolyte packets a day and eating when headache comes on. Discussed significant diminish is abrupt short thoughts of SI (hardly ever happening anymore). Discussed finding herself and finding things in which bring her enjoyment in life. Reports her grandchildren as being at the top of the list. She continues to play cards, attend Spiritism, garden, clean and has volunteered in the past. Pt reports she is feeling better but not herself since being on meds. She discussed being on meds and what her plan would be long term. Encouraged pt to discuss with PCP and Psychiatrist further. Normalized her feelings about feeling better and pondering if meds are needed anymore. Advised her to not stop any medications without MD approval. Pt expresses understanding and will not alter medications on her own.       1/4/23  Pt reports she has been doing pretty well despite having recent COVID. She  "enjoyed her holidays and was able to work through high anxiety when an unexpected situation arose. She has been feeling more motivated to get [place] decor put away. She has been sleeping roughly 7 hours at night. She notes feeling really well after sleeping that much. States increase in memory when she has better sleep. Pt continues to work on tech related to decreasing anxiety.     12/20/22  Says that she is doing pretty good with [place] right around the corner. She has been proactive in her planning to decrease stress and anxiety. Reports consistent 6.5 hours of sleep on average. She would like to ideally get 8-9 hours of sleep a night. Discussed talking with Dr. Pompa again about the benefits of a sleep study at her next appt. Pt continues to utilize her breathing and relaxation tech. Reports she feels she is \"maintaining\" in regards to progress with anxiety which is her goal for the holiday season. Discussed the importance of remaining active, socially engaged, and exploring anxiety provoking activities. She is reading a book recommended by [facility] and has been open to concepts the book has been offered.     12/14/22  Met with pt this morning. Continues to endorse daily anxiety but is feeling more in control of her responses to it. Discussed appt with Dr. Pompa. Reports she has been she has been drinking more water to help with headaches. Has noted some improvement with headaches. Continues to have afternoon slump. She has been using the Calm lorena prior to her anxiety that comes around 4:30. Discussed racing heart rate when anxious. Normalized her experience and discussed nature of anxiety and panic attacks. Discussed an event when she noticed she was having increased anxiety baking cookies for the holidays. Explored internal dialog she has with herself in those moments. She was able to continue to move through with baking despite the anxiety. Reports she was able to go to [employer] this past Sunday and enjoyed " self. Discussed knowing her limits and pushing self into more anxious situations. Worked on inner dialog in those events. Discussed staying in the present as much as possibe. Recommended some reading on Anxiety and Panic to gain more understanding of what is happing to her when feeling more anxious.       12/7/22  Met with pt this morning, reports she is pretty good. She continues to work on breathing techs. She reports around 5pm is when she is needing to use it. Tends to be more anxious around 4:30-5:30pm. Then anxiety resolves and she can proceed with her evening. Reports she has been socializing with her friends for lunch and [place] shopping. Reports some anxiety about in socializing but has seen a great amount of improvement. She has been attending [employer] frequently however a headache has been interfering. She wants to discuss headaches and a slump time in the afternoon with Dr. Pompa on Friday. Discussed inflexibility and working on allowing for change in her plans. Discussed rigidness to her scheduling.      11/29/22  Met with pt this morning. Reports some anxiety with logging on to the appt. Reports Thanksgiving went really well despite making changes to the normal traditions. States that she also had family over for her husbands birthday and cooked dinner for everyone. She has not done this in many months and she stated it felt really good to do. Reports some anxiety around [place] shopping. As an alternative she has been ordering gifts online this year. Sleep continues to improve. States she sleep 7 hours last night. She continues to attend to self talk. Discussed check ins with self to assess level of anxiety.       11/15/22  Met with pt today for follow-up. She reports doing really well. She went out to dinner with family. She really enjoyed herself at dinner which is a difference from several months ago. She is really enjoying spending time with her grand children. On Sunday she drove on her own  from the first time in a very long time. [facility] validated and praised pts efforts. Reports still is having some general anxiety through out the day. She is looking forward to a sleep over with her julio. Discussed finding ways to access the comforts of meditation on the go. Discussed the up coming holidays and the work she is doing to avoid negative impact on her mental health. Reports having headaches consistently and an afternoon slump/tiredness.     11/9/22  Met with pt this morning. Report 6-7 hours of sleep which is an improvement from 3-4 hours. Reports the daily anxiety has greatly improved. States her anxiety is about half the amount that it was since last. She has been engaging socially. States she even has begun to attend [employer]ism again. She has been active in acknowledging the anxiety more objectively. She continues with her exercises. She has not had as much fog in the afternoon. Reinforced the need to continue practicing the tools she finds helpful from therapy. Normalized setbacks. Discussed SI from last session. Reports she has noticed them decreasing. Continues to feel the thoughts are highly unwanted and not matching how she feels. Denies plans or intentions.         10/31/22  Met with pt this morning. She states she is doing well today. Reports going to bed a bit later to possibly avoid such an early waking. Anxiety has been somewhat improved since last visit. Continues to have some physical sx such as chest tightening. Continues to have nervous feeling through the day. Continues to have anxiety when doing things socially. She has been writing a journal with a gratitude list. Exercising daily with walking the dog. Using the Calm lorena for meditation and imagery. She is working on acknowledging the anxiety and attempting to not  it. Unexpected events or plans tend to bring up a lot of anxiety. Discussed anxiety about planning for Thanksgiving. She is not feeling up to planning and doing the  "prep. She is having trouble with frustration with self. Discussed acceptance around the progress of her mental health and listening to family who are offering her a lot of support.     Pt brought up the reoccurring thoughts she has been having where she thinks to herself \"I wish I were dead.\" This thought comes automatically when she is feeling not up for doing something. States this is not a true feeling of hers but the thought continues to enter her mind. She is unnerved by this at times. She denies plan or intention. She is concerned it could be medication related as she noticed it start around the time of starting meds for anxiety. She describes these thoughts as fleeting and easily controlled. [facility] communicated this information with Dr. Pompa, pts psychiatrist. Dr. Pompa will touch base with pt via [facility].       10/14/22  Met with pt this morning who is reporting anxiety has improved a bit since last visit. States she has been active in body scanning, incorporating meditation back into her life and use of imagery. Discussed continued engagement in anxiety provoking situations. Discussed attempting to observe anxiety in a non- mental and objective manner. States people around her have noticed an improvement in her sx however she continues to feel \"like a wreck inside.\" Finally, discussed examining situations in which she is attempting to have control over. Worked on identifying areas in which she has no or little control. Discussed attempts to let go. Does report some issues with sleep on fast release Ambien. Pt has follow up appt with Dr. Pompa in just over a week. She has been taking long release Ambien which allows her to sleep longer throughout the night. Encouraged her to reach out to provider prior to appt if issues worsen.     10/5/22  Reports doing well today. She has been trying to remain social. She tends to have an initial panic stricken feeling when committing to something with others. Since " last med adjustment appears more calmer. Discussed areas in her life which appear to cause most of her anxiety. One area is around situations she has little control over, such as the result of her mental health. Discussed focus on acceptance of current state of mental health and focus on things she does have control over such as active steps she is taking to help her mental health. Discussed deep breathing exercise as a way of calming self with highly anxious. Introduced progressive muscle relaxation. Briefly discussed getting into a practice of meditation again. Finally, discussed finding frequent exercise as a part of anxiety. This is a bit more difficult as she has a bad knee which has been limiting for her. Pt is committed to trying out tech. Prior to next session.        9/27/22 DA  Met with pt for initial assessment. Reports several years ago she had a significant head injury which resulted in her inability to sleep. She reports anxiety followed shortly after. Since April of this year she has been experiencing more depressive sx. Her anxiety is mostly around wanting to feel better from her anxiety and not having positive results. Some of the depressive sx she has been having is poor appetite, lack of interest, struggle with focus, low energy, and has had on very rare occasions fleeting thoughts of wishing she were dead to end the pain of depression. Her anxiety sx often are in the form of nervousness, restlessness, and rumination. She used to have panic attacks each morning, however this has subsided for several months now. She has not attempted therapy before. She would like to gain skills relating to managing anxiety. She currently is working with Dr. Pompa for medications.     Progress on Treatment Objective(s) / Homework:  Satisfactory progress - ACTION (Actively working towards change); Intervened by reinforcing change plan / affirming steps taken    Motivational Interviewing    MI Intervention: Expressed  Empathy/Understanding, Supported Autonomy, Collaboration, Evocation, Open-ended questions, Change talk (evoked) and Reframe     Change Talk Expressed by the Patient: Desire to change Ability to change Reasons to change Need to change    Provider Response to Change Talk: E - Evoked more info from patient about behavior change, A - Affirmed patient's thoughts, decisions, or attempts at behavior change, R - Reflected patient's change talk and S - Summarized patient's change talk statements      Care Plan review completed: Yes    Medication Review:  No changes to current psychiatric medication(s)    Medication Compliance:  Yes    Changes in Health Issues:   None reported    Chemical Use Review:   Substance Use: Chemical use reviewed, no active concerns identified      Tobacco Use: No current tobacco use.      Assessment: Current Emotional / Mental Status (status of significant symptoms):  Risk status (Self / Other harm or suicidal ideation)  Patient denies a history of suicidal ideation, suicide attempts, self-injurious behavior, homicidal ideation, homicidal behavior and and other safety concerns  Patient denies current fears or concerns for personal safety.  Patient denies current or recent suicidal ideation or behaviors.  Patient denies current or recent homicidal ideation or behaviors.  Patient denies current or recent self injurious behavior or ideation.  Patient denies other safety concerns.  A safety and risk management plan has not been developed at this time, however patient was encouraged to call [place] [phone] should there be a change in any of these risk factors.    Appearance:   Appropriate   Eye Contact:   Good   Psychomotor Behavior: Normal   Attitude:   Cooperative   Orientation:   All  Speech   Rate / Production: Normal    Volume:  Normal   Mood:    Anxious   Affect:    Appropriate   Thought Content:  Clear   Thought Form:  Coherent  Logical   Insight:    Good     Diagnoses:  1. Generalized anxiety  disorder    2. Insomnia, unspecified type    3. Mood disorder (H)        Collateral Reports Completed:  Not Applicable    Plan: (Homework, other):  Patient was given information about behavioral services and encouraged to schedule a follow up appointment with the clinic [facility] in 1 week.  She was also given information about mental health symptoms and treatment options .  CD Recommendations: No indications of CD issues.  Erika Francisco MSW LICSW      ______________________________________________________________________    Integrated Primary Care Behavioral Health Treatment Plan    Patient's Name: Jolynn Haji  YOB: 1955    Date of Creation: 10/5/22  Date Treatment Plan Last Reviewed/Revised: 1/13/23    DSM5 Diagnoses: 300.02 (F41.1) Generalized Anxiety Disorder  Psychosocial / Contextual Factors: Individual Factors high anxiety associated with depressive sx, interfereing with ability to function as she would like.  .  PROMIS (reviewed every 90 days):     Referral / Collaboration:  Was/were discussed and patient will pursue. - individual therapy    Anticipated number of session for this episode of care: 5-8 sessions  Anticipation frequency of session: Weekly  Anticipated Duration of each session: 38-52 minutes  Treatment plan will be reviewed in 90 days or when goals have been changed.       MeasurableTreatment Goal(s) related to diagnosis / functional impairment(s)  Goal 1: Patient will work with provider to manage symptoms    I will know I've met my goal when when less anxious and feeling down.      Objective #A (Patient Action)    Patient will  attend all sessions.  Status: Continued - Date(s):1/13/23     Intervention(s)  Therapist will teach educate patient on treatment options, clarify concerns, work with pt to overcome any resistance to compliance..    Objective #B  Patient will identify 3 fears / thoughts that contribute to feeling anxious.  Status: Continued - Date(s):1/13/23      Intervention(s)  Therapist will educate patient on treatment options, clarify concerns, work with pt to overcome any resistance to compliance..    Objective #C  Patient will identify 3 initial signs or symptoms of anxiety.  Status: Continued - Date(s):1/13/23     Intervention(s)  Therapist will educate patient on treatment options, clarify concerns, work with pt to overcome any resistance to compliance..        Patient has reviewed and agreed to the above plan.      DINESH Whipple  February 1, 2023  Answers for HPI/ROS submitted by the patient on 12/6/2022  TIFFANY 7 TOTAL SCORE: 3

## 2023-02-01 ENCOUNTER — VIRTUAL VISIT (OUTPATIENT)
Dept: BEHAVIORAL HEALTH | Facility: CLINIC | Age: 68
End: 2023-02-01
Payer: COMMERCIAL

## 2023-02-01 DIAGNOSIS — F41.1 GENERALIZED ANXIETY DISORDER: Primary | ICD-10-CM

## 2023-02-01 DIAGNOSIS — F39 MOOD DISORDER (H): ICD-10-CM

## 2023-02-01 DIAGNOSIS — G47.00 INSOMNIA, UNSPECIFIED TYPE: ICD-10-CM

## 2023-02-01 PROCEDURE — 90834 PSYTX W PT 45 MINUTES: CPT | Mod: 95 | Performed by: SOCIAL WORKER

## 2023-02-02 ENCOUNTER — OFFICE VISIT (OUTPATIENT)
Dept: FAMILY MEDICINE | Facility: CLINIC | Age: 68
End: 2023-02-02
Payer: COMMERCIAL

## 2023-02-02 VITALS
BODY MASS INDEX: 20.46 KG/M2 | TEMPERATURE: 97.8 F | OXYGEN SATURATION: 99 % | WEIGHT: 135 LBS | DIASTOLIC BLOOD PRESSURE: 68 MMHG | HEIGHT: 68 IN | RESPIRATION RATE: 16 BRPM | HEART RATE: 99 BPM | SYSTOLIC BLOOD PRESSURE: 126 MMHG

## 2023-02-02 DIAGNOSIS — I10 ESSENTIAL HYPERTENSION WITH GOAL BLOOD PRESSURE LESS THAN 140/90: ICD-10-CM

## 2023-02-02 DIAGNOSIS — L57.8 SUN-DAMAGED SKIN: ICD-10-CM

## 2023-02-02 DIAGNOSIS — Z00.00 ENCOUNTER FOR MEDICARE ANNUAL WELLNESS EXAM: Primary | ICD-10-CM

## 2023-02-02 DIAGNOSIS — Z78.0 ASYMPTOMATIC POSTMENOPAUSAL STATUS: ICD-10-CM

## 2023-02-02 DIAGNOSIS — M85.89 OSTEOPENIA OF MULTIPLE SITES: ICD-10-CM

## 2023-02-02 DIAGNOSIS — D12.6 ADENOMATOUS POLYP OF COLON, UNSPECIFIED PART OF COLON: ICD-10-CM

## 2023-02-02 DIAGNOSIS — E78.5 HYPERLIPIDEMIA LDL GOAL <130: ICD-10-CM

## 2023-02-02 DIAGNOSIS — G47.00 INSOMNIA, UNSPECIFIED TYPE: ICD-10-CM

## 2023-02-02 DIAGNOSIS — E55.9 VITAMIN D DEFICIENCY: ICD-10-CM

## 2023-02-02 DIAGNOSIS — Z12.11 SCREEN FOR COLON CANCER: ICD-10-CM

## 2023-02-02 PROCEDURE — 82570 ASSAY OF URINE CREATININE: CPT | Performed by: FAMILY MEDICINE

## 2023-02-02 PROCEDURE — 82043 UR ALBUMIN QUANTITATIVE: CPT | Performed by: FAMILY MEDICINE

## 2023-02-02 PROCEDURE — G0439 PPPS, SUBSEQ VISIT: HCPCS | Performed by: FAMILY MEDICINE

## 2023-02-02 PROCEDURE — 99214 OFFICE O/P EST MOD 30 MIN: CPT | Mod: 25 | Performed by: FAMILY MEDICINE

## 2023-02-02 ASSESSMENT — ANXIETY QUESTIONNAIRES
GAD7 TOTAL SCORE: 4
4. TROUBLE RELAXING: NOT AT ALL
2. NOT BEING ABLE TO STOP OR CONTROL WORRYING: SEVERAL DAYS
7. FEELING AFRAID AS IF SOMETHING AWFUL MIGHT HAPPEN: NOT AT ALL
5. BEING SO RESTLESS THAT IT IS HARD TO SIT STILL: NOT AT ALL
1. FEELING NERVOUS, ANXIOUS, OR ON EDGE: MORE THAN HALF THE DAYS
GAD7 TOTAL SCORE: 4
GAD7 TOTAL SCORE: 4
IF YOU CHECKED OFF ANY PROBLEMS ON THIS QUESTIONNAIRE, HOW DIFFICULT HAVE THESE PROBLEMS MADE IT FOR YOU TO DO YOUR WORK, TAKE CARE OF THINGS AT HOME, OR GET ALONG WITH OTHER PEOPLE: NOT DIFFICULT AT ALL
8. IF YOU CHECKED OFF ANY PROBLEMS, HOW DIFFICULT HAVE THESE MADE IT FOR YOU TO DO YOUR WORK, TAKE CARE OF THINGS AT HOME, OR GET ALONG WITH OTHER PEOPLE?: NOT DIFFICULT AT ALL
6. BECOMING EASILY ANNOYED OR IRRITABLE: NOT AT ALL
3. WORRYING TOO MUCH ABOUT DIFFERENT THINGS: SEVERAL DAYS
7. FEELING AFRAID AS IF SOMETHING AWFUL MIGHT HAPPEN: NOT AT ALL

## 2023-02-02 ASSESSMENT — ENCOUNTER SYMPTOMS
HEMATURIA: 0
BREAST MASS: 0
DIARRHEA: 0
HEADACHES: 1
NAUSEA: 0
PALPITATIONS: 0
CHILLS: 0
ABDOMINAL PAIN: 0
SHORTNESS OF BREATH: 0
NERVOUS/ANXIOUS: 1
WEAKNESS: 0
FEVER: 0
EYE PAIN: 0
DIZZINESS: 1
SORE THROAT: 0
COUGH: 0
HEMATOCHEZIA: 0
JOINT SWELLING: 0
DYSURIA: 0
PARESTHESIAS: 0
CONSTIPATION: 0
MYALGIAS: 0
HEARTBURN: 0
ARTHRALGIAS: 0
FREQUENCY: 0

## 2023-02-02 ASSESSMENT — PATIENT HEALTH QUESTIONNAIRE - PHQ9
SUM OF ALL RESPONSES TO PHQ QUESTIONS 1-9: 1
SUM OF ALL RESPONSES TO PHQ QUESTIONS 1-9: 1

## 2023-02-02 ASSESSMENT — ACTIVITIES OF DAILY LIVING (ADL): CURRENT_FUNCTION: NO ASSISTANCE NEEDED

## 2023-02-02 NOTE — LETTER
[facility] [place]  02/02/23  Patient: Jolynn Haji  YOB: 1955  Medical Record Number: [id number]                                                                                  Non-Opioid Controlled Substance Agreement    This is an agreement between you and your provider regarding safe and appropriate use of controlled substances prescribed by your care team. Controlled substances are?medicines that can cause physical and mental dependence (abuse).     There are strict laws about having and using these medicines. We here at [facility] are  committed to working with you in your efforts to get better. To support you in this work, we'll help you schedule regular office appointments for medicine refills. If we must cancel or change your appointment for any reason, we'll make sure you have enough medicine to last until your next appointment.     As a Provider, I will:     Listen carefully to your concerns while treating you with respect.     Recommend a treatment plan that I believe is in your best interest and may involve therapies other than medicine.      Talk with you often about the possible benefits and the risk of harm of any medicine that we prescribe for you.    Assess the safety of this medicine and check how well it works.      Provide a plan on how to taper (discontinue or go off) using this medicine if the decision is made to stop its use.      ::  As a Patient, I understand controlled substances:       Are prescribed by my care provider to help me function or work and manage my condition(s).?    Are strong medicines and can cause serious side effects.       Need to be taken exactly as prescribed.?Combining controlled substances with certain medicines or chemicals (such as illegal drugs, alcohol, sedatives, sleeping pills, and benzodiazepines) can be dangerous or even fatal.? If I stop taking my medicines suddenly, I may have severe withdrawal symptoms.     The risks,  benefits, and side effects of these medicine(s) were explained to me. I agree that:    1. I will take part in other treatments as advised by my care team. This may be psychiatry or counseling, physical therapy, behavioral therapy, group treatment or a referral to specialist.    2. I will keep all my appointments and understand this is part of the monitoring of controlled substances.?My care team may require an office visit for EVERY controlled substance refill. If I miss appointments or don t follow instructions, my care team may stop my medicine    3. I will take my medicines as prescribed. I will not change the dose or schedule unless my care team tells me to. There will be no refills if I run out early.      4. I may be asked to come to the clinic and complete a urine drug test or complete a pill count. If I don t give a urine sample or participate in a pill count, the care team may stop my medicine.    5. I will only receive controlled substance prescriptions from this clinic. If I am treated by another provider, I will tell them that I am taking controlled substances and that I have a treatment agreement with this provider. I will inform my [facility] care team within one business day if I am given a prescription for any controlled substance by another healthcare provider. My [facility] care team can contact other providers and pharmacists about my use of any medicines.    6. It is up to me to make sure that I don't run out of my medicines on weekends or holidays.?If my care team is willing to refill my prescription without a visit, I must request refills only during office hours. Refills may take up to 3 business days to process. I will use one pharmacy to fill all my controlled substance prescriptions. I will notify the clinic about any changes to my insurance or medicine availability.    7. I am responsible for my prescriptions. If the medicine/prescription is lost, stolen or destroyed, it will  not be replaced.?I also agree not to share controlled substance medicines with anyone.     8. I am aware I should not use any illegal or recreational drugs. I agree not to drink alcohol unless my care team says I can.     9. If I enroll in the [place] [facility] Cannabis program, I will tell my care team before my next refill.    10. I will tell my care team right away if I become pregnant, have a new medical problem treated outside of my regular clinic, or have a change in my medicines.     11. I understand that this medicine can affect my thinking, judgment and reaction time.? Alcohol and drugs affect the brain and body, which can affect the safety of my driving. Being under the influence of alcohol or drugs can affect my decision-making, behaviors, personal safety and the safety of others. Driving while impaired (DWI) can occur if a person is driving, operating or in physical control of a car, motorcycle, boat, snowmobile, ATV, motorbike, off-road vehicle or any other motor vehicle ([place] Statute 169A.20). I understand the risk if I choose to drive or operate any vehicle or machinery.    I understand that if I do not follow any of the conditions above, my prescriptions or treatment may be stopped or changed.   I agree that my provider, clinic care team and pharmacy may work with any city, state or federal law enforcement agency that investigates the misuse, sale or other diversion of my controlled medicine. I will allow my provider to discuss my care with, or share a copy of, this agreement with any other treating provider, pharmacy or emergency room where I receive care.     I have read this agreement and have asked questions about anything I did not understand.    ________________________________________________________  Patient Signature - Jolynn Haji     ___________________                   Date     ________________________________________________________  Provider Signature - Helga Ibarra MD        ___________________                   Date     ________________________________________________________  Witness Signature (required if provider not present while patient signing)          ___________________                   Date

## 2023-02-02 NOTE — PROGRESS NOTES
"SUBJECTIVE:   Azalea is a 67 year old who presents for Preventive Visit and the following other medical problems:      1. Encounter for Medicare annual wellness exam    2. Osteopenia    3. Asymptomatic postmenopausal status    4. Essential hypertension with goal blood pressure less than 140/90    5. Hyperlipidemia LDL goal <130    6. Sun-damaged skin    7. Insomnia, unspecified type    8. Vitamin D deficiency    9. Adenomatous polyp of colon, unspecified part of colon    10. Screen for colon cancer       Patient has been advised of split billing requirements and indicates understanding: Yes  Are you in the first 12 months of your Medicare coverage?  No    Healthy Habits:     In general, how would you rate your overall health?  Good    Frequency of exercise:  2-3 days/week    Duration of exercise:  15-30 minutes    Do you usually eat at least 4 servings of fruit and vegetables a day, include whole grains    & fiber and avoid regularly eating high fat or \"junk\" foods?  No    Taking medications regularly:  Yes    Medication side effects:  Lightheadedness and Other    Ability to successfully perform activities of daily living:  No assistance needed    Home Safety:  No safety concerns identified    Hearing Impairment:  No hearing concerns    In the past 6 months, have you been bothered by leaking of urine?  No    In general, how would you rate your overall mental or emotional health?  Fair      PHQ-2 Total Score: 0      Have you ever done Advance Care Planning? (For example, a Health Directive, POLST, or a discussion with a medical provider or your loved ones about your wishes): No, advance care planning information given to patient to review.  Advanced care planning was discussed at today's visit.       Fall risk  Fallen 2 or more times in the past year?: No  Any fall with injury in the past year?: No    Cognitive Screening   1) Repeat 3 items (Leader, Season, Table)    2) Clock draw: NORMAL  3) 3 item recall: Recalls 3 " objects  Results: 3 items recalled: COGNITIVE IMPAIRMENT LESS LIKELY    Mini-CogTM Copyright MALACHI Recinos. Licensed by the author for use in [facility]; reprinted with permission (aurelio@.[facility]). All rights reserved.      Do you have sleep apnea, excessive snoring or daytime drowsiness?: no    Reviewed and updated as needed this visit by clinical staff   Tobacco  Allergies  Meds  Problems  Med Hx  Surg Hx  Fam Hx          Reviewed and updated as needed this visit by Provider                 Social History     Tobacco Use     Smoking status: Never     Smokeless tobacco: Never   Substance Use Topics     Alcohol use: Yes     Alcohol/week: 0.0 - 1.0 standard drinks     Comment: 2 per month         Alcohol Use 1/31/2023   Prescreen: >3 drinks/day or >7 drinks/week? No   Prescreen: >3 drinks/day or >7 drinks/week? -       Still having some headaches. First noticed about 6 weeks ago.  Sometime awakens with them. Happens every 3-4 days.  Takes 1 tylenol 650mg pill and that totally takes the headache away. Last time she had her eyes checked was 1 year ago - has appt on 2/6/2023.    Doesn't get headaches when she gets more sleep. Hasn't been sleeping as well lately.  Has to get up to urinate sometimes.  Usually able to get back to sleep.      Hyperlipidemia Follow-Up: is not fasting today       Are you regularly taking any medication or supplement to lower your cholesterol?   No    Are you having muscle aches or other side effects that you think could be caused by your cholesterol lowering medication?  No     Recent Labs   Lab Test 01/31/22  0952 01/13/21  0823   CHOL 208* 211*   HDL 82 75   * 123*   TRIG 81 65         Hypertension Follow-up:       Do you check your blood pressure regularly outside of the clinic? Yes     Are you following a low salt diet? No    Are your blood pressures ever more than 140 on the top number (systolic) OR more   than 90 on the bottom number (diastolic), for example 140/90?  No    Creatinine   Date Value Ref Range Status   02/08/2023 0.62 0.51 - 0.95 mg/dL Final   01/13/2021 0.68 0.52 - 1.04 mg/dL Final      Depression and Anxiety Follow-Up    How are you doing with your depression since your last visit? Improved - seeing Dr. Enriqueta Pompa     How are you doing with your anxiety since your last visit?  No change    Are you having other symptoms that might be associated with depression or anxiety? Yes:  anxious    Have you had a significant life event? No     Do you have any concerns with your use of alcohol or other drugs? No    Social History     Tobacco Use     Smoking status: Never     Smokeless tobacco: Never   Substance Use Topics     Alcohol use: Yes     Alcohol/week: 0.0 - 1.0 standard drinks     Comment: 2 per month     Drug use: No     Comment: no herbal meds either     PHQ 12/15/2022 1/13/2023 2/2/2023   PHQ-9 Total Score 4 2 1   Q9: Thoughts of better off dead/self-harm past 2 weeks Not at all Not at all Not at all     TIFFANY-7 SCORE 1/12/2023 1/13/2023 2/2/2023   Total Score - - -   Total Score 3 (minimal anxiety) 1 (minimal anxiety) 4 (minimal anxiety)   Total Score 3 1 4     Last PHQ-9 2/2/2023   1.  Little interest or pleasure in doing things 0   2.  Feeling down, depressed, or hopeless 0   3.  Trouble falling or staying asleep, or sleeping too much 0   4.  Feeling tired or having little energy 1   5.  Poor appetite or overeating 0   6.  Feeling bad about yourself 0   7.  Trouble concentrating 0   8.  Moving slowly or restless 0   Q9: Thoughts of better off dead/self-harm past 2 weeks 0   PHQ-9 Total Score 1   Difficulty at work, home, or with people -     TIFFANY-7  2/2/2023   1. Feeling nervous, anxious, or on edge 2   2. Not being able to stop or control worrying 1   3. Worrying too much about different things 1   4. Trouble relaxing 0   5. Being so restless that it is hard to sit still 0   6. Becoming easily annoyed or irritable 0   7. Feeling afraid, as if something awful  might happen 0   TIFFANY-7 Total Score 4   If you checked any problems, how difficult have they made it for you to do your work, take care of things at home, or get along with other people? Not difficult at all       Suicide Assessment Five-step Evaluation and Treatment (SAFE-T)      Current providers sharing in care for this patient include:   Patient Care Team:  Helga Ibarra MD as PCP - Kim Mittal APRN CNS as Nurse Practitioner (Clinical Nurse Specialist)  Helga Ibarra MD as Assigned PCP  Ligia Carbajal PA-C as Physician Assistant (Dermatology)  Giovany Quiroz MD as Assigned Musculoskeletal Provider  Judy Pompa DO as Assigned Behavioral Health Provider  Sheldon Rogers MD as Assigned Surgical Provider    The following health maintenance items are reviewed in Epic and correct as of today:  Health Maintenance   Topic Date Due     DEXA  10/05/2020     COLORECTAL CANCER SCREENING  05/01/2023     PHQ-9  08/02/2023     MAMMO SCREENING  11/16/2023     MEDICARE ANNUAL WELLNESS VISIT  02/02/2024     MICROALBUMIN  02/02/2024     ANNUAL REVIEW OF HM ORDERS  02/02/2024     FALL RISK ASSESSMENT  02/02/2024     BMP  02/08/2024     ADVANCE CARE PLANNING  02/02/2028     LIPID  02/08/2028     DTAP/TDAP/TD IMMUNIZATION (5 - Td or Tdap) 01/13/2031     HEPATITIS C SCREENING  Completed     DEPRESSION ACTION PLAN  Completed     INFLUENZA VACCINE  Completed     Pneumococcal Vaccine: 65+ Years  Completed     ZOSTER IMMUNIZATION  Completed     COVID-19 Vaccine  Completed     IPV IMMUNIZATION  Aged Out     MENINGITIS IMMUNIZATION  Aged Out     Lab work is in process  Labs reviewed in EPIC  BP Readings from Last 3 Encounters:   02/02/23 126/68   01/13/23 126/76   12/16/22 130/76    Wt Readings from Last 3 Encounters:   02/02/23 61.2 kg (135 lb)   01/13/23 59.7 kg (131 lb 11.2 oz)   12/16/22 59.1 kg (130 lb 3.2 oz)                  Patient Active Problem List   Diagnosis      Essential hypertension with goal blood pressure less than 140/90     Hidradenitis     Localized osteoarthritis of hand     Synovial cyst of popliteal space     DERMATOPHYTOSIS OF NAIL- toenails      Lipidosis     Osteopenia     Postmenopausal     NUMBNESS AND TINGLING OF FOOT - bilateral -mild      Numbness of feet- distal feet R>L      Stress incontinence, female - mild     Cough     Acute bronchitis- recurrent- ? related to mold in school building- pt has since retired and no further bronchitis     Rosacea     History of migraine headaches- assoc. with nausea/vomiting - resolved around menopause - were  premenstrual      Dupuytren's disease of palm - right 4th digit flexor tendon     Sun-damaged skin     Seasonal allergic rhinitis     Basal cell carcinoma of leg, right     Adjustment disorder with mixed anxiety and depressed mood- mild-moderate  at this time     Family history of celiac disease     Generalized anxiety disorder     Insomnia, unspecified type     Chronic constipation     Raynaud's phenomenon without gangrene     Other secondary osteoarthritis of left knee     Primary localized osteoarthrosis of left lower leg     Hyperlipidemia LDL goal <130     Basal cell carcinoma     Colon polyps     Screening for cervical cancer     Adjustment insomnia     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 - much improved - essentially gone       Drug-induced parkinsonism (H)- Abilify - generic = ARIPiprazole( 5mg)      Mood disorder (H)     Past Surgical History:   Procedure Laterality Date     COLONOSCOPY  05/2018    polyps, tubular - 2 mm - due 5 yrs     ZZC NONSPECIFIC PROCEDURE  2000    Colposcopy       Social History     Tobacco Use     Smoking status: Never     Smokeless tobacco: Never   Substance Use Topics     Alcohol use: Yes     Alcohol/week: 0.0 - 1.0 standard drinks     Comment: 2 per month     Family History   Problem Relation Age of Onset      Thyroid Disease Mother         Graves disease     Hypertension Mother      Diabetes Mother         type 2     Blood Disease Mother          from leukemia at age 78     Diabetes Daughter         Juvenile Diabetes     Heart Disease Father 62        MV problem with CHF  at 62/ from MI     Hypertension Father      Diabetes Son         Juvenile Diabetes     Cancer Maternal Uncle         type ??     Cancer Maternal Uncle         liver         Current Outpatient Medications   Medication Sig Dispense Refill     clindamycin (CLINDAGEL) 1 % external gel Apply topically daily 60 g 11     lactobacillus rhamnosus, GG, (CULTURELL) capsule Take 1 capsule by mouth daily       lisinopril (ZESTRIL) 5 MG tablet Takes 1/2 tablet to equal 2.5 mg       venlafaxine (EFFEXOR XR) 150 MG 24 hr capsule Take 1 capsule (150 mg) by mouth At Bedtime 90 capsule 1     zolpidem ER (AMBIEN CR) 6.25 MG CR tablet Take 1 tablet (6.25 mg) by mouth nightly as needed for sleep 90 tablet 0     Allergies   Allergen Reactions     Abilify [Aripiprazole] Other (See Comments)     Suspected drug-induced Parkinsonism      Fosamax Diarrhea     Diarrhea, stomach cramps, jaw pain      Amoxicillin-Pot Clavulanate Itching and Rash     Augmentin Itching and Rash     Recent Labs   Lab Test 23  1035 22  1642 22  0952 22  0952 21  0823 19  1129 17  1225 17  1604   A1C  --   --   --   --   --   --   --  5.5   *  --   --  110* 123* 115*   < >  --    HDL 81  --   --  82 75 76   < >  --    TRIG 60  --   --  81 65 71   < >  --    ALT 19 21  --  19 17 20   < > 22   CR 0.62 0.78   < > 0.68 0.68 0.70   < > 0.61   GFRESTIMATED >90 83   < > >90 >90 90   < > >90  Non  GFR Calc     GFRESTBLACK  --   --   --   --  >90 >90   < > >90  African American GFR Calc     POTASSIUM 4.1 4.4   < > 4.1 4.1 4.8   < > 3.9   TSH 1.65 1.30   < > 1.53 2.07 0.96   < >  --     < > = values in this interval not  displayed.      Pneumonia Vaccine:For adults 65 years or older who do not have an immunocompromising condition, cerebrospinal fluid leak, or cochlear implant and want to receive PPSV23 ONLY: Administer 1 dose of PPSV23. Anyone who received any doses of PPSV23 before age 65 should receive 1 final dose of the vaccine at age 65 or older. Administer this last dose at least 5 years after the prior PPSV23 dose.  Mammogram Screening: Mammogram Screening: Recommended mammography every 1-2 years with patient discussion and risk factor consideration  History of abnormal Pap smear: NO - age 65 - see link Cervical Cytology Screening Guidelines    Breast CA Risk Assessment (FHS-7) 1/31/2022   Do you have a family history of breast, colon, or ovarian cancer? No / Unknown           Pertinent mammograms are reviewed under the imaging tab.    Review of Systems   Constitutional: Negative for chills and fever.   HENT: Negative for congestion, ear pain, hearing loss and sore throat.    Eyes: Negative for pain and visual disturbance.   Respiratory: Negative for cough and shortness of breath.    Cardiovascular: Negative for chest pain, palpitations and peripheral edema.   Gastrointestinal: Negative for abdominal pain, constipation, diarrhea, heartburn, hematochezia and nausea.   Breasts:  Negative for tenderness, breast mass and discharge.   Genitourinary: Negative for dysuria, frequency, genital sores, hematuria, pelvic pain, urgency, vaginal bleeding and vaginal discharge.   Musculoskeletal: Negative for arthralgias, joint swelling and myalgias.   Skin: Negative for rash.   Neurological: Positive for dizziness and headaches. Negative for weakness and paresthesias.   Psychiatric/Behavioral: Negative for mood changes. The patient is nervous/anxious.      Constitutional, HEENT, cardiovascular, pulmonary, GI, , musculoskeletal, neuro, skin, endocrine and psych systems are negative, except as otherwise noted.    OBJECTIVE:   /68    "Pulse 99   Temp 97.8  F (36.6  C)   Resp 16   Ht 1.715 m (5' 7.5\")   Wt 61.2 kg (135 lb)   LMP 06/28/2001   SpO2 99%   BMI 20.83 kg/m   Estimated body mass index is 20.83 kg/m  as calculated from the following:    Height as of this encounter: 1.715 m (5' 7.5\").    Weight as of this encounter: 61.2 kg (135 lb).  Physical Exam:   GENERAL APPEARANCE: healthy, alert and no distress  EYES: Eyes grossly normal to inspection, PERRL and conjunctivae and sclerae normal  HENT: ear canals and TM's normal, nose and mouth without ulcers or lesions, oropharynx clear and oral mucous membranes moist  NECK: no adenopathy, no asymmetry, masses, or scars and thyroid normal to palpation  RESP: lungs clear to auscultation - no rales, rhonchi or wheezes  BREAST: normal without masses, tenderness or nipple discharge and no palpable axillary masses or adenopathy  CV: regular rate and rhythm, normal S1 S2, no S3 or S4, no murmur, click or rub, no peripheral edema and peripheral pulses strong  ABDOMEN: soft, nontender, no hepatosplenomegaly, no masses and bowel sounds normal  MS: no musculoskeletal defects are noted and gait is age appropriate without ataxia  SKIN: no suspicious lesions or rashes, some whitish spots on several  toenails.   NEURO: Normal strength and tone, sensory exam grossly normal, mentation intact and speech normal  PSYCH: mentation appears normal and affect normal/bright    Diagnostic Test Results:  Labs reviewed in Epic    ASSESSMENT / PLAN:       ICD-10-CM    1. Encounter for Medicare annual wellness exam  Z00.00       2. Osteopenia  M85.80       3. Asymptomatic postmenopausal status  Z78.0 DEXA HIP/PELVIS/SPINE - Future      4. Essential hypertension with goal blood pressure less than 140/90  I10 Albumin Random Urine Quantitative with Creat Ratio     TSH with free T4 reflex     Comprehensive metabolic panel     CBC with platelets     Albumin Random Urine Quantitative with Creat Ratio     Comprehensive metabolic " panel (BMP + Alb, Alk Phos, ALT, AST, Total. Bili, TP)     TSH with free T4 reflex     CBC with platelets     CANCELED: TSH with free T4 reflex     CANCELED: Comprehensive metabolic panel     CANCELED: CBC with platelets      5. Hyperlipidemia LDL goal <130  E78.5 Albumin Random Urine Quantitative with Creat Ratio     Lipid panel reflex to direct LDL Fasting     Comprehensive metabolic panel     Albumin Random Urine Quantitative with Creat Ratio     Comprehensive metabolic panel (BMP + Alb, Alk Phos, ALT, AST, Total. Bili, TP)     CANCELED: Comprehensive metabolic panel      6. Sun-damaged skin  L57.8       7. Insomnia, unspecified type  G47.00       8. Vitamin D deficiency  E55.9 25 Hydroxyvitamin D2 and D3     25 Hydroxyvitamin D2 and D3     CANCELED: 25 Hydroxyvitamin D2 and D3      9. Adenomatous polyp of colon, unspecified part of colon  D12.6 Colonoscopy Screening  Referral      10. Screen for colon cancer  Z12.11 Colonoscopy Screening  Referral          Patient has been advised of split billing requirements and indicates understanding: Yes    Pt gets her ambien from psychiatry - Dr. Enriqueta Pompa.  Will have her do CSA.       COUNSELING:  Reviewed preventive health counseling, as reflected in patient instructions  Future Appointments 2/20/2023 - 8/19/2023      Date Visit Type Length Department Provider     [facility]23/2023 10:[facility]    Location Instructions:     Your appointment is at [facility] located in the [facility] at [address] [place] [address]. Please check in at the  in [address].              2/23/2023 10:30 [facility] RETURN 30 min [facility] PSYCHIATRY Judy Pompa DO    Location Instructions:     [facility] has 2 buildings on its campus. Please visit us at [address] [place] and enter the building with the numbers [phone] on  it.               2/24/2023  4:40 PM OFFICE VISIT 20 min  FAMILY PRACTICE Helga Ibarra MD    Location Instructions:     [facility] is located at [address], [address] Free parking is available; access the lot by turning north from [address] onto [facility] [facility], then west onto [facility].              3/3/2023 10:00 AM [facility] RETURN 60 min  BEHAVIORAL HEALTH Erika Francisco, [facility]    Location Instructions:     Your appointment is at [facility] located in the [facility] at [address] [place] [address]. Please check in at the  in [address].              3/17/2023 12:30 PM [facility] 240 min [facility] Kassie Ashton, PhD    Location Instructions:     Located in the [facility] at [address] [facility] [address]. For parking options, enter the [facility] /arrival plaza from [facility] and attendants can assist you based on your needs.  parking is available for those with limited mobility M-F from 7 a.m. to 5 p.m. Due to short staffing, we are unable to offer  to all patients/visitors. Visit [email or web address] for more details.  Self-parking:   [facility]: Located across from the main entrance, this is a convenient option for patients. Enter on [facility]. Parking attendants available most hours to assist.      [facility] Ramp: Enter at the [facility] SE entrance (one block north of the [facility] main entrance). Do not enter the ramp from [facility] - this entrance is not staffed and is further from the [facility] main entrance.              5/16/2023 10:30 AM RETURN 15 min [facility] Ligia Carbajal PA-C    Location Instructions:     [address] [place] [id number]                 Return in about 22 days (around 2/24/2023) for recheck mood with Dr. Ibarra per patient request , needs fasting lab only too .     She reports that  she has never smoked. She has never used smokeless tobacco.      Appropriate preventive services were discussed with this patient, including applicable screening as appropriate for cardiovascular disease, diabetes, osteopenia/osteoporosis, and glaucoma.  As appropriate for age/gender, discussed screening for colorectal cancer, prostate cancer, breast cancer, and cervical cancer. Checklist reviewing preventive services available has been given to the patient.    Reviewed patients plan of care and provided an AVS. The Complex Care Plan (for patients with higher acuity and needing more deliberate coordination of services) for Jolynn meets the Care Plan requirement. This Care Plan has been established and reviewed with the Patient.           Helga Ibarra MD  [facility]    Identified Health Risks:  Answers for HPI/ROS submitted by the patient on 2/2/2023  PHQ9 TOTAL SCORE: 1  TIFFANY 7 TOTAL SCORE: 4        The patient was counseled and encouraged to consider modifying their diet and eating habits. She was provided with information on recommended healthy diet options.  The patient was provided with suggestions to help her develop a healthy emotional lifestyle.

## 2023-02-02 NOTE — PATIENT INSTRUCTIONS
[facility]  [address] [place] [address]  Office: [phone]   Fax:    [phone]     To treat fungal toenails without a prescription :  wear socks or stockings try not to  go barefoot in your shoes ,  after bathing in the am, use 100% tea tree oil ( available at [employer]) on affected nails and then at night use Lolis-Sal Anti-fungal toenail treatment to affected nails as well.     It can take 6-9 months for them to fully go away.  1-2x/week For fungal toenail - take 1 tablespoon of bleach in 1 cup of warm water 1-2x/day - soak for 10 minutes - may take 3 months to fully clear.        Patient Education   Personalized Prevention Plan  You are due for the preventive services outlined below.  Your care team is available to assist you in scheduling these services.  If you have already completed any of these items, please share that information with your care team to update in your medical record.  Health Maintenance Due   Topic Date Due    Osteoporosis Screening  10/05/2020    Kidney Microalbumin Urine Test  01/31/2023    ANNUAL REVIEW OF HM ORDERS  01/31/2023    Annual Wellness Visit  01/31/2023        Patient Education   Personalized Prevention Plan  You are due for the preventive services outlined below.  Your care team is available to assist you in scheduling these services.  If you have already completed any of these items, please share that information with your care team to update in your medical record.  Health Maintenance Due   Topic Date Due    Osteoporosis Screening  10/05/2020    Kidney Microalbumin Urine Test  01/31/2023    Annual Wellness Visit  01/31/2023     Preventive Health Recommendations    See your health care provider every year to  Review health changes.   Discuss preventive care.    Review your medicines if your doctor has prescribed any.  You no longer need a yearly Pap test unless you've had an abnormal Pap test in the past 10 years. If you have  vaginal symptoms, such as bleeding or discharge, be sure to talk with your provider about a Pap test.  Every 1 to 2 years, have a mammogram.  If you are over 69, talk with your health care provider about whether or not you want to continue having screening mammograms.  Every 10 years, have a colonoscopy. Or, have a yearly FIT test (stool test). These exams will check for colon cancer.   Have a cholesterol test every 5 years, or more often if your doctor advises it.   Have a diabetes test (fasting glucose) every three years. If you are at risk for diabetes, you should have this test more often.   At age 65, have a bone density scan (DEXA) to check for osteoporosis (brittle bone disease).    Shots:  Get a flu shot each year.  Get a tetanus shot every 10 years.  Talk to your doctor about your pneumonia vaccines. There are now two you should receive - Pneumovax (PPSV 23) and Prevnar (PCV 13).  Talk to your pharmacist about the shingles vaccine.  Talk to your doctor about the hepatitis B vaccine.    Nutrition:   Eat at least 5 servings of fruits and vegetables each day.  Eat whole-grain bread, whole-wheat pasta and brown rice instead of white grains and rice.  Get adequate Calcium and Vitamin D.     Lifestyle  Exercise at least 150 minutes a week (30 minutes a day, 5 days a week). This will help you control your weight and prevent disease.  Limit alcohol to one drink per day.  No smoking.   Wear sunscreen to prevent skin cancer.   See your dentist twice a year for an exam and cleaning.  See your eye doctor every 1 to 2 years to screen for conditions such as glaucoma, macular degeneration and cataracts.    Personalized Prevention Plan  You are due for the preventive services outlined below.  Your care team is available to assist you in scheduling these services.  If you have already completed any of these items, please share that information with your care team to update in your medical record.  Health Maintenance   Topic  Date Due    DEXA  10/05/2020    MICROALBUMIN  01/31/2023    MEDICARE ANNUAL WELLNESS VISIT  01/31/2023    BMP  04/25/2023    COLORECTAL CANCER SCREENING  05/01/2023    PHQ-9  08/02/2023    MAMMO SCREENING  11/16/2023    ANNUAL REVIEW OF HM ORDERS  02/02/2024    FALL RISK ASSESSMENT  02/02/2024    LIPID  01/31/2027    ADVANCE CARE PLANNING  02/02/2028    DTAP/TDAP/TD IMMUNIZATION (5 - Td or Tdap) 01/13/2031    HEPATITIS C SCREENING  Completed    DEPRESSION ACTION PLAN  Completed    INFLUENZA VACCINE  Completed    Pneumococcal Vaccine: 65+ Years  Completed    ZOSTER IMMUNIZATION  Completed    COVID-19 Vaccine  Completed    IPV IMMUNIZATION  Aged Out    MENINGITIS IMMUNIZATION  Aged Out       Understanding USDA MyPlate  The USDA has guidelines to help you make healthy food choices. These are called MyPlate. MyPlate shows the food groups that make up healthy meals using the image of a place setting. Before you eat, think about the healthiest choices for what to put on your plate or in your cup or bowl. To learn more about building a healthy plate, visit www.choosemyplate.gov.    The food groups  Fruits. Any fruit or 100% fruit juice counts as part of the Fruit Group. Fruits may be fresh, canned, frozen, or dried, and may be whole, cut-up, or pureed. Make 1/2 of your plate fruits and vegetables.  Vegetables. Any vegetable or 100% vegetable juice counts as a member of the Vegetable Group. Vegetables may be fresh, frozen, canned, or dried. They can be served raw or cooked and may be whole, cut-up, or mashed. Make 1/2 of your plate fruits and vegetables.  Grains. All foods made from grains are part of the Grains Group. These include wheat, rice, oats, cornmeal, and barley. Grains are often used to make foods such as bread, pasta, oatmeal, cereal, tortillas, and grits. Grains should be no more than 1/4 of your plate. At least half of your grains should be whole grains.  Protein. This group includes meat, poultry, seafood,  beans and peas, eggs, processed soy products (such as tofu), nuts (including nut butters), and seeds. Make protein choices no more than 1/4 of your plate. Meat and poultry choices should be lean or low fat.  Dairy. The Dairy Group includes all fluid milk products and foods made from milk that contain calcium, such as yogurt and cheese. (Foods that have little calcium, such as cream, butter, and cream cheese, are not part of this group.) Most dairy choices should be low-fat or fat-free.  Oils. Oils aren't a food group, but they do contain essential nutrients. However it's important to watch your intake of oils. These are fats that are liquid at room temperature. They include canola, corn, olive, soybean, vegetable, and sunflower oil. Foods that are mainly oil include mayonnaise, certain salad dressings, and soft margarines. You likely already get your daily oil allowance from the foods you eat.  Things to limit  Eating healthy also means limiting these things in your diet:     Salt (sodium). Many processed foods have a lot of sodium. To keep sodium intake down, eat fresh vegetables, meats, poultry, and seafood when possible. Purchase low-sodium, reduced-sodium, or no-salt-added food products at the store. And don't add salt to your meals at home. Instead, season them with herbs and spices such as dill, oregano, cumin, and paprika. Or try adding flavor with lemon or lime zest and juice.  Saturated fat. Saturated fats are most often found in animal products such as beef, pork, and chicken. They are often solid at room temperature, such as butter. To reduce your saturated fat intake, choose leaner cuts of meat and poultry. And try healthier cooking methods such as grilling, broiling, roasting, or baking. For a simple lower-fat swap, use plain nonfat yogurt instead of mayonnaise when making potato salad or macaroni salad.  Added sugars. These are sugars added to foods. They are in foods such as ice cream, candy, soda,  fruit drinks, sports drinks, energy drinks, cookies, pastries, jams, and syrups. Cut down on added sugars by sharing sweet treats with a family member or friend. You can also choose fruit for dessert, and drink water or other unsweetened beverages.     [employer] last reviewed this educational content on 6/1/2020 2000-2021 [employer]. All rights reserved. This information is not intended as a substitute for professional medical care. Always follow your healthcare professional's instructions.        Your Health Risk Assessment indicates you feel you are not in good emotional health.    Recreation   Recreation is not limited to sports and team events. It includes any activity that provides relaxation, interest, enjoyment, and exercise. Recreation provides an outlet for physical, mental, and social energy. It can give a sense of worth and achievement. It can help you stay healthy.    Mental Exercise and Social Involvement  Mental and emotional health is as important as physical health. Keep in touch with friends and family. Stay as active as possible. Continue to learn and challenge yourself.   Things you can do to stay mentally active are:  Learn something new, like a foreign language or musical instrument.   Play SCRABBLE or do crossword puzzles. If you cannot find people to play these games with you at home, you can play them with others on your computer through the Internet.   Join a games club--anything from card games to chess or checkers or lawn bowling.   Start a new hobby.   Go back to school.   Volunteer.   Read.   Keep up with world events.      Thank you so much or choosing [facility]  for your Health Care. It was a pleasure seeing you at your visit today! Please contact us with any questions or concerns you may have.                   Helga Ibarra MD                              To reach your [facility] care team after hours  "call:   [phone] press #2 \"to speak with your care team\".  This will get you to our clinic instead of routing to [facility]  scheduling.     PLEASE NOTE OUR HOURS HAVE CHANGED secondary to COVID-19 coronavirus pandemic, as we are trying to minimize patient exposure to the virus,  which is now widespread in the state.  These hours may change with very little notice.  We apologize for any inconvenience.       Our current clinic hours are:          Monday- Thursday   7:00am - 6:00pm  in person.      Friday  7:00am- 5:00pm                       Saturday and Sunday : Closed to in person and virtual visits        We have telephone and virtual visit times available between    7:00am - 6pm on Monday-Friday as well.                                                Phone:  [phone]      Our pharmacy hours: Monday through Friday 8:00am to 5:00pm                        Saturday - 9:00 am to 12 noon       Sunday : Closed.              Phone:  [phone]              ###  Please note: at this time we are not accepting any walk-in visits. ###      There is also information available at our web site:  [email or web address]    If your provider ordered any lab tests and you do not receive the results within 10 business days, please call the clinic.    If you need a medication refill please contact your pharmacy.  Please allow 3 business days for your refill to be completed.    Our clinic offers telephone visits and e visits.  Please ask one of your team members to explain more.      Use [email or web address] (secure email communication and access to your chart) to send your primary care provider a message or make an appointment. Ask someone on your Team how to sign up for [email or web address].                "

## 2023-02-03 LAB
CREAT UR-MCNC: 54.1 MG/DL
MICROALBUMIN UR-MCNC: <12 MG/L
MICROALBUMIN/CREAT UR: NORMAL MG/G{CREAT}

## 2023-02-08 ENCOUNTER — LAB (OUTPATIENT)
Dept: LAB | Facility: CLINIC | Age: 68
End: 2023-02-08
Payer: COMMERCIAL

## 2023-02-08 DIAGNOSIS — E78.5 HYPERLIPIDEMIA LDL GOAL <130: ICD-10-CM

## 2023-02-08 DIAGNOSIS — E55.9 VITAMIN D DEFICIENCY: ICD-10-CM

## 2023-02-08 DIAGNOSIS — I10 ESSENTIAL HYPERTENSION WITH GOAL BLOOD PRESSURE LESS THAN 140/90: ICD-10-CM

## 2023-02-08 LAB
ALBUMIN SERPL BCG-MCNC: 4.5 G/DL (ref 3.5–5.2)
ALP SERPL-CCNC: 73 U/L (ref 35–104)
ALT SERPL W P-5'-P-CCNC: 19 U/L (ref 10–35)
ANION GAP SERPL CALCULATED.3IONS-SCNC: 13 MMOL/L (ref 7–15)
AST SERPL W P-5'-P-CCNC: 22 U/L (ref 10–35)
BILIRUB SERPL-MCNC: 0.5 MG/DL
BUN SERPL-MCNC: 16.2 MG/DL (ref 8–23)
CALCIUM SERPL-MCNC: 9.7 MG/DL (ref 8.8–10.2)
CHLORIDE SERPL-SCNC: 103 MMOL/L (ref 98–107)
CHOLEST SERPL-MCNC: 222 MG/DL
CREAT SERPL-MCNC: 0.62 MG/DL (ref 0.51–0.95)
DEPRECATED HCO3 PLAS-SCNC: 25 MMOL/L (ref 22–29)
ERYTHROCYTE [DISTWIDTH] IN BLOOD BY AUTOMATED COUNT: 13 % (ref 10–15)
GFR SERPL CREATININE-BSD FRML MDRD: >90 ML/MIN/1.73M2
GLUCOSE SERPL-MCNC: 95 MG/DL (ref 70–99)
HCT VFR BLD AUTO: 40.4 % (ref 35–47)
HDLC SERPL-MCNC: 81 MG/DL
HGB BLD-MCNC: 13.4 G/DL (ref 11.7–15.7)
LDLC SERPL CALC-MCNC: 129 MG/DL
MCH RBC QN AUTO: 30 PG (ref 26.5–33)
MCHC RBC AUTO-ENTMCNC: 33.2 G/DL (ref 31.5–36.5)
MCV RBC AUTO: 91 FL (ref 78–100)
NONHDLC SERPL-MCNC: 141 MG/DL
PLATELET # BLD AUTO: 261 10E3/UL (ref 150–450)
POTASSIUM SERPL-SCNC: 4.1 MMOL/L (ref 3.4–5.3)
PROT SERPL-MCNC: 6.9 G/DL (ref 6.4–8.3)
RBC # BLD AUTO: 4.46 10E6/UL (ref 3.8–5.2)
SODIUM SERPL-SCNC: 141 MMOL/L (ref 136–145)
TRIGL SERPL-MCNC: 60 MG/DL
TSH SERPL DL<=0.005 MIU/L-ACNC: 1.65 UIU/ML (ref 0.3–4.2)
WBC # BLD AUTO: 4.8 10E3/UL (ref 4–11)

## 2023-02-08 PROCEDURE — 80053 COMPREHEN METABOLIC PANEL: CPT

## 2023-02-08 PROCEDURE — 85027 COMPLETE CBC AUTOMATED: CPT

## 2023-02-08 PROCEDURE — 84443 ASSAY THYROID STIM HORMONE: CPT

## 2023-02-08 PROCEDURE — 82306 VITAMIN D 25 HYDROXY: CPT

## 2023-02-08 PROCEDURE — 80061 LIPID PANEL: CPT

## 2023-02-08 PROCEDURE — 36415 COLL VENOUS BLD VENIPUNCTURE: CPT

## 2023-02-10 ENCOUNTER — VIRTUAL VISIT (OUTPATIENT)
Dept: BEHAVIORAL HEALTH | Facility: CLINIC | Age: 68
End: 2023-02-10
Payer: COMMERCIAL

## 2023-02-10 DIAGNOSIS — F41.1 GENERALIZED ANXIETY DISORDER: Primary | ICD-10-CM

## 2023-02-10 DIAGNOSIS — F39 MOOD DISORDER (H): ICD-10-CM

## 2023-02-10 DIAGNOSIS — G47.00 INSOMNIA, UNSPECIFIED TYPE: ICD-10-CM

## 2023-02-10 PROCEDURE — 90837 PSYTX W PT 60 MINUTES: CPT | Mod: VID | Performed by: SOCIAL WORKER

## 2023-02-10 NOTE — PROGRESS NOTES
[facility]  February 10, 2023      Behavioral Health Clinician Progress Note    Patient Name: Jolynn Haji           Service Type:  Individual      Service Location:   MyChart / Email (patient reached)     Session Start Time: 11:00 am  Session End Time: 11:56 am      Session Length: 53 - 60      Attendees: Patient     Service Modality:  Video Visit:      Provider verified identity through the following two step process.  Patient provided:  Patient is known previously to provider    Telemedicine Visit: The patient's condition can be safely assessed and treated via synchronous audio and visual telemedicine encounter.      Reason for Telemedicine Visit: Patient has requested telehealth visit    Originating Site (Patient Location): Patient's home    Distant Site (Provider Location): [facility]    Consent:  The patient/guardian has verbally consented to: the potential risks and benefits of telemedicine (video visit) versus in person care; bill my insurance or make self-payment for services provided; and responsibility for payment of non-covered services.     Patient would like the video invitation sent by:  My Chart    Mode of Communication:  Video Conference via [employer]    As the provider I attest to compliance with applicable laws and regulations related to telemedicine.    Visit Activities (Refresh list every visit): [facility] Only    Diagnostic Assessment Date: 9/27/22  Treatment Plan Review Date: 4/13/23  See Flowsheets for today's PHQ-9 and TIFFANY-7 results  Previous PHQ-9:   PHQ-9 SCORE 12/15/2022 1/13/2023 2/2/2023   PHQ-9 Total Score - - -   PHQ-9 Total Score [facility] 4 (Minimal depression) 2 (Minimal depression) 1 (Minimal depression)   PHQ-9 Total Score 4 2 1   Some encounter information is confidential and restricted. Go to Review Flowsheets activity to see all data.     Previous TIFFANY-7:   TIFFANY-7 SCORE 1/12/2023 1/13/2023 2/2/2023  "  Total Score - - -   Total Score 3 (minimal anxiety) 1 (minimal anxiety) 4 (minimal anxiety)   Total Score 3 1 4       KARINA LEVEL:  KARINA Score (Last Two) 8/27/2010 2/22/2011   KARINA Raw Score 51 49   Activation Score 91.6 82.8   KARINA Level 4 4       DATA  Extended Session (60+ minutes): No  Interactive Complexity: No  Crisis: No  [facility] Patient: No    Treatment Objective(s) Addressed in This Session:  Target Behavior(s): anxiety and depression    Depressed Mood: Increase interest, engagement, and pleasure in doing things  Decrease frequency and intensity of feeling down, depressed, hopeless  Improve quantity and quality of night time sleep / decrease daytime naps  Feel less tired and more energy during the day   Improve diet, appetite, mindful eating, and / or meal planning  Identify negative self-talk and behaviors: challenge core beliefs, myths, and actions  Improve concentration, focus, and mindfulness in daily activities   Feel less fidgety, restless or slow in daily activities / interpersonal interactions  Anxiety: will experience a reduction in anxiety, will develop more effective coping skills to manage anxiety symptoms, will develop healthy cognitive patterns and beliefs and will increase ability to function adaptively    Current Stressors / Issues:  Pt reports she had such a good time with her trip to her [employer]. Much reduced anxious than expected (compared to home) and sleep was really good. Discussed implementing things that worked at [employer] for home. Continues to have some anxiety when going into store or before events. Reports panic attacks are less than previous week. Discussed triggers for anxiety. Discussed do expectations of self meet values.  ID values from next time and look at expectations for self. Pt asked \"What could I do better to be good enough\". Struggles with being good enough for self. Talked about list of things she wants to achieve in order to feel \"good enough\". One being able to go " to places with out wondering if she will be anxious.     Identified Triggers for anxiety and background thought behind it:  More anxious when tired: less bandwidth to utilize skills  Feeling going to be late  Making quick decisions  Technology   organizing big meal for company: what people expect of her  MD visits or waiting for visit: anticipations of events at MD, has not had poor experiences, husbands medical experience   What if something happens to Zach   Packing groceries and people are behind her  Sometimes going out to eat (much better): menu with too many options and having to make decisions  Holiday: anticipation and wanting it to be perfect for people/what ng people expect of her  Dealing with insurance problem: hassle and being out of her control   Being in the unknown about things: Slowness of recovery from anxiety, what ifs  Do people see I am anxious  Large crowd of people: leading up to and during (highest), people might be noticing her, dont want to stand   *Things that need to be done but are not done(floor needing to be washed, getting decorations down etc): likes list to check off (what to accomplish that week), Not accomplishing goal. Self placed expectations (high standards from growing up), perfectionism (mom perfectionism as a child), some self comparisons to others, taking on too much during a week, and expecting to be like old self all the time (compare self to old self).     2/1/23  Pt is reporting she is doing well. She has been out driving to do shopping and seeing friends. Continues to have improvement with anxiety each day. Continues to have headaches but is wanting to change medications until she gets back from trip to her sisters home. Having some anxiety about spending over night for the first time since COVID. Discussed steps to addressing anxiety at her sisters home and drive up. 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 Became dizzy and become  worried about the physical sx she was experiencing. Anxiety grew from that point on. Discussed how she handled it in the moment with redirecting thoughts, accepting anxiety and attempting some of the anxiety tech discussed. Pt will explore other situations in which anxiety tends to arise for next session. Pt has been keeping a journal of her daily experience and will reexamine journal to better understand anxiety. Pt listed several situations she knows brings anxiety.      Identified Triggers:  Feeling rushed  Not wanting to be late  Having others do responsibilities she once carried as her responsibility  Being flexible  Expectations of others  What do others see/think of her when experiencing panic attack    1/23/23  Reports she is doing really well. Reports she will be driving up to see her grandson. She is highly looking forward to this and making [email or web address] cards. Some anxiety with going in to the grocery store but not once she gets started shopping. Reports her ultimate goal would be to go to the mall alone with traffic as that has been causing anxiety. Headaches continue to be of concern to her. Discussed further and identified them coming following either a panic attack or after high anxiety. Discussed using sensory tech for elevating the anxiety and also for following headache such as heating pad for neck or eyes. Discussed her conversation with Dr. Pompa about possibly changing meds to help with headaches. She reports relishing in current progress and is not wanting meds to change at this time. Headaches do interfere sometimes with daily life. She will be going to lunch with several college friends. This has caused a bit of anxiety in accepting the invite. Normalized experience for pt.       Progress on Treatment Objective(s) / Homework:  Satisfactory progress - ACTION (Actively working towards change); Intervened by reinforcing change plan / affirming steps taken    Motivational Interviewing    MI  Intervention: Expressed Empathy/Understanding, Supported Autonomy, Collaboration, Evocation, Open-ended questions, Change talk (evoked) and Reframe     Change Talk Expressed by the Patient: Desire to change Ability to change Reasons to change Need to change    Provider Response to Change Talk: E - Evoked more info from patient about behavior change, A - Affirmed patient's thoughts, decisions, or attempts at behavior change, R - Reflected patient's change talk and S - Summarized patient's change talk statements      Care Plan review completed: Yes    Medication Review:  No changes to current psychiatric medication(s)    Medication Compliance:  Yes    Changes in Health Issues:   None reported    Chemical Use Review:   Substance Use: Chemical use reviewed, no active concerns identified      Tobacco Use: No current tobacco use.      Assessment: Current Emotional / Mental Status (status of significant symptoms):  Risk status (Self / Other harm or suicidal ideation)  Patient denies a history of suicidal ideation, suicide attempts, self-injurious behavior, homicidal ideation, homicidal behavior and and other safety concerns  Patient denies current fears or concerns for personal safety.  Patient denies current or recent suicidal ideation or behaviors.  Patient denies current or recent homicidal ideation or behaviors.  Patient denies current or recent self injurious behavior or ideation.  Patient denies other safety concerns.  A safety and risk management plan has not been developed at this time, however patient was encouraged to call [place] [phone] should there be a change in any of these risk factors.    Appearance:   Appropriate   Eye Contact:   Good   Psychomotor Behavior: Normal   Attitude:   Cooperative   Orientation:   All  Speech   Rate / Production: Normal    Volume:  Normal   Mood:    Anxious   Affect:    Appropriate   Thought Content:  Clear   Thought Form:  Coherent  Logical   Insight:    Good      Diagnoses:  1. Generalized anxiety disorder    2. Insomnia, unspecified type    3. Mood disorder (H)        Collateral Reports Completed:  Not Applicable    Plan: (Homework, other):  Patient was given information about behavioral services and encouraged to schedule a follow up appointment with the clinic [facility] in 1 week.  She was also given information about mental health symptoms and treatment options .  CD Recommendations: No indications of CD issues.  Erika Francisco MSW LICSW      ______________________________________________________________________    Integrated Primary Care Behavioral Health Treatment Plan    Patient's Name: Jolynn Haji  YOB: 1955    Date of Creation: 10/5/22  Date Treatment Plan Last Reviewed/Revised: 1/13/23    DSM5 Diagnoses: 300.02 (F41.1) Generalized Anxiety Disorder  Psychosocial / Contextual Factors: Individual Factors high anxiety associated with depressive sx, interfereing with ability to function as she would like.  .  PROMIS (reviewed every 90 days):     Referral / Collaboration:  Was/were discussed and patient will pursue. - individual therapy    Anticipated number of session for this episode of care: 5-8 sessions  Anticipation frequency of session: Weekly  Anticipated Duration of each session: 38-52 minutes  Treatment plan will be reviewed in 90 days or when goals have been changed.       MeasurableTreatment Goal(s) related to diagnosis / functional impairment(s)  Goal 1: Patient will work with provider to manage symptoms    I will know I've met my goal when when less anxious and feeling down.      Objective #A (Patient Action)    Patient will  attend all sessions.  Status: Continued - Date(s):1/13/23     Intervention(s)  Therapist will teach educate patient on treatment options, clarify concerns, work with pt to overcome any resistance to compliance..    Objective #B  Patient will identify 3 fears / thoughts that contribute to feeling anxious.  Status: Continued -  Date(s):1/13/23     Intervention(s)  Therapist will educate patient on treatment options, clarify concerns, work with pt to overcome any resistance to compliance..    Objective #C  Patient will identify 3 initial signs or symptoms of anxiety.  Status: Continued - Date(s):1/13/23     Intervention(s)  Therapist will educate patient on treatment options, clarify concerns, work with pt to overcome any resistance to compliance..        Patient has reviewed and agreed to the above plan.      DINESH Whipple  February 10, 2023  Answers for HPI/ROS submitted by the patient on 12/6/2022  TIFFANY 7 TOTAL SCORE: 3

## 2023-02-13 LAB
DEPRECATED CALCIDIOL+CALCIFEROL SERPL-MC: <32 UG/L (ref 20–75)
VITAMIN D2 SERPL-MCNC: <5 UG/L
VITAMIN D3 SERPL-MCNC: 27 UG/L

## 2023-02-15 NOTE — PROGRESS NOTES
[facility]  February 17, 2023      Behavioral Health Clinician Progress Note    Patient Name: Jolynn Haji           Service Type:  Individual      Service Location:   MyChart / Email (patient reached)     Session Start Time: 10:56 am  Session End Time: 11:51 am      Session Length: 53 - 60      Attendees: Patient     Service Modality:  Video Visit:      Provider verified identity through the following two step process.  Patient provided:  Patient is known previously to provider    Telemedicine Visit: The patient's condition can be safely assessed and treated via synchronous audio and visual telemedicine encounter.      Reason for Telemedicine Visit: Patient has requested telehealth visit    Originating Site (Patient Location): Patient's home    Distant Site (Provider Location): [facility]    Consent:  The patient/guardian has verbally consented to: the potential risks and benefits of telemedicine (video visit) versus in person care; bill my insurance or make self-payment for services provided; and responsibility for payment of non-covered services.     Patient would like the video invitation sent by:  My Chart    Mode of Communication:  Video Conference via Amwell    As the provider I attest to compliance with applicable laws and regulations related to telemedicine.    Visit Activities (Refresh list every visit): [facility] Only    Diagnostic Assessment Date: 9/27/22  Treatment Plan Review Date: 4/13/23  See Flowsheets for today's PHQ-9 and TIFFANY-7 results  Previous PHQ-9:   PHQ-9 SCORE 12/15/2022 1/13/2023 2/2/2023   PHQ-9 Total Score - - -   PHQ-9 Total Score [facility] 4 (Minimal depression) 2 (Minimal depression) 1 (Minimal depression)   PHQ-9 Total Score 4 2 1   Some encounter information is confidential and restricted. Go to Review Flowsheets activity to see all data.     Previous TIFFANY-7:   TIFFANY-7 SCORE 1/12/2023 1/13/2023 2/2/2023    Total Score - - -   Total Score 3 (minimal anxiety) 1 (minimal anxiety) 4 (minimal anxiety)   Total Score 3 1 4       KARINA LEVEL:  KARINA Score (Last Two) 8/27/2010 2/22/2011   KARINA Raw Score 51 49   Activation Score 91.6 82.8   KARINA Level 4 4       DATA  Extended Session (60+ minutes): No  Interactive Complexity: No  Crisis: No  [facility] Patient: No    Treatment Objective(s) Addressed in This Session:  Target Behavior(s): anxiety and depression    Depressed Mood: Increase interest, engagement, and pleasure in doing things  Decrease frequency and intensity of feeling down, depressed, hopeless  Improve quantity and quality of night time sleep / decrease daytime naps  Feel less tired and more energy during the day   Improve diet, appetite, mindful eating, and / or meal planning  Identify negative self-talk and behaviors: challenge core beliefs, myths, and actions  Improve concentration, focus, and mindfulness in daily activities   Feel less fidgety, restless or slow in daily activities / interpersonal interactions  Anxiety: will experience a reduction in anxiety, will develop more effective coping skills to manage anxiety symptoms, will develop healthy cognitive patterns and beliefs and will increase ability to function adaptively    Current Stressors / Issues:  Pt reports she is doing well. Reports anxiety has been better. Able to control it better with accepting and reognition of anxiety. Continues to utilize meditation and review of skills taught in therapy. Ice packs has been helping with headaches. Self talk has much improved and has gained more confidence. Anxiety in the evening have improved but still feeling the anxiety at times. She has been wanting to work exercise into her daily routine. Sleep has been also improve. ID values: umm, family, self respect responsibility and kindness.       2/10/23  Pt reports she had such a good time with her trip to her sisters house. Much reduced anxious than expected (compared to  "home) and sleep was really good. Discussed implementing things that worked at sisters for home. Continues to have some anxiety when going into store or before events. Reports panic attacks are less than previous week. Discussed triggers for anxiety. Discussed do expectations of self meet values.  ID values from next time and look at expectations for self. Pt asked \"What could I do better to be good enough\". Struggles with being good enough for self. Talked about list of things she wants to achieve in order to feel \"good enough\". One being able to go to places with out wondering if she will be anxious.     Identified Triggers for anxiety and background thought behind it:  More anxious when tired: less bandwidth to utilize skills  Feeling going to be late  Making quick decisions  Technology   organizing big meal for company: what people expect of her  MD visits or waiting for visit: anticipations of events at MD, has not had poor experiences, husbands medical experience   What if something happens to Zach   Packing groceries and people are behind her  Sometimes going out to eat (much better): menu with too many options and having to make decisions  Holiday: anticipation and wanting it to be perfect for people/what ng people expect of her  Dealing with insurance problem: hassle and being out of her control   Being in the unknown about things: Slowness of recovery from anxiety, what ifs  Do people see I am anxious  Large crowd of people: leading up to and during (highest), people might be noticing her, dont want to stand   *Things that need to be done but are not done(floor needing to be washed, getting decorations down etc): likes list to check off (what to accomplish that week), Not accomplishing goal. Self placed expectations (high standards from growing up), perfectionism (mom perfectionism as a child), some self comparisons to others, taking on too much during a week, and expecting to be like old self all the " time (compare self to old self).     2/1/23  Pt is reporting she is doing well. She has been out driving to do shopping and seeing friends. Continues to have improvement with anxiety each day. Continues to have headaches but is wanting to change medications until she gets back from trip to her sisters home. Having some anxiety about spending over night for the first time since COVID. Discussed steps to addressing anxiety at her sisters home and drive up. 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 Became dizzy and become worried about the physical sx she was experiencing. Anxiety grew from that point on. Discussed how she handled it in the moment with redirecting thoughts, accepting anxiety and attempting some of the anxiety tech discussed. Pt will explore other situations in which anxiety tends to arise for next session. Pt has been keeping a journal of her daily experience and will reexamine journal to better understand anxiety. Pt listed several situations she knows brings anxiety.      Identified Triggers:  Feeling rushed  Not wanting to be late  Having others do responsibilities she once carried as her responsibility  Being flexible  Expectations of others  What do others see/think of her when experiencing panic attack    1/23/23  Reports she is doing really well. Reports she will be driving up to see her grandson. She is highly looking forward to this and making [employer] cards. Some anxiety with going in to the grocery store but not once she gets started shopping. Reports her ultimate goal would be to go to the mall alone with traffic as that has been causing anxiety. Headaches continue to be of concern to her. Discussed further and identified them coming following either a panic attack or after high anxiety. Discussed using sensory tech for elevating the anxiety and also for following headache such as heating pad for neck or eyes. Discussed her conversation with Dr. Pompa  about possibly changing meds to help with headaches. She reports relishing in current progress and is not wanting meds to change at this time. Headaches do interfere sometimes with daily life. She will be going to lunch with several college friends. This has caused a bit of anxiety in accepting the invite. Normalized experience for pt.       Progress on Treatment Objective(s) / Homework:  Satisfactory progress - ACTION (Actively working towards change); Intervened by reinforcing change plan / affirming steps taken    Motivational Interviewing    MI Intervention: Expressed Empathy/Understanding, Supported Autonomy, Collaboration, Evocation, Open-ended questions, Change talk (evoked) and Reframe     Change Talk Expressed by the Patient: Desire to change Ability to change Reasons to change Need to change    Provider Response to Change Talk: E - Evoked more info from patient about behavior change, A - Affirmed patient's thoughts, decisions, or attempts at behavior change, R - Reflected patient's change talk and S - Summarized patient's change talk statements      Care Plan review completed: Yes    Medication Review:  No changes to current psychiatric medication(s)    Medication Compliance:  Yes    Changes in Health Issues:   None reported    Chemical Use Review:   Substance Use: Chemical use reviewed, no active concerns identified      Tobacco Use: No current tobacco use.      Assessment: Current Emotional / Mental Status (status of significant symptoms):  Risk status (Self / Other harm or suicidal ideation)  Patient denies a history of suicidal ideation, suicide attempts, self-injurious behavior, homicidal ideation, homicidal behavior and and other safety concerns  Patient denies current fears or concerns for personal safety.  Patient denies current or recent suicidal ideation or behaviors.  Patient denies current or recent homicidal ideation or behaviors.  Patient denies current or recent self injurious behavior or  ideation.  Patient denies other safety concerns.  A safety and risk management plan has not been developed at this time, however patient was encouraged to call [place] [phone] should there be a change in any of these risk factors.    Appearance:   Appropriate   Eye Contact:   Good   Psychomotor Behavior: Normal   Attitude:   Cooperative   Orientation:   All  Speech   Rate / Production: Normal    Volume:  Normal   Mood:    Anxious   Affect:    Appropriate   Thought Content:  Clear   Thought Form:  Coherent  Logical   Insight:    Good     Diagnoses:  1. Generalized anxiety disorder    2. Insomnia, unspecified type    3. Mood disorder (H)        Collateral Reports Completed:  Not Applicable    Plan: (Homework, other):  Patient was given information about behavioral services and encouraged to schedule a follow up appointment with the clinic [facility] in 1 week.  She was also given information about mental health symptoms and treatment options .  CD Recommendations: No indications of CD issues.  Erika Francisco MSW LICSW      ______________________________________________________________________    Integrated Primary Care Behavioral Health Treatment Plan    Patient's Name: Jolynn Haji  YOB: 1955    Date of Creation: 10/5/22  Date Treatment Plan Last Reviewed/Revised: 1/13/23    DSM5 Diagnoses: 300.02 (F41.1) Generalized Anxiety Disorder  Psychosocial / Contextual Factors: Individual Factors high anxiety associated with depressive sx, interfereing with ability to function as she would like.  .  PROMIS (reviewed every 90 days):     Referral / Collaboration:  Was/were discussed and patient will pursue. - individual therapy    Anticipated number of session for this episode of care: 5-8 sessions  Anticipation frequency of session: Weekly  Anticipated Duration of each session: 38-52 minutes  Treatment plan will be reviewed in 90 days or when goals have been changed.       MeasurableTreatment Goal(s) related to  diagnosis / functional impairment(s)  Goal 1: Patient will work with provider to manage symptoms    I will know I've met my goal when when less anxious and feeling down.      Objective #A (Patient Action)    Patient will  attend all sessions.  Status: Continued - Date(s):1/13/23     Intervention(s)  Therapist will teach educate patient on treatment options, clarify concerns, work with pt to overcome any resistance to compliance..    Objective #B  Patient will identify 3 fears / thoughts that contribute to feeling anxious.  Status: Continued - Date(s):1/13/23     Intervention(s)  Therapist will educate patient on treatment options, clarify concerns, work with pt to overcome any resistance to compliance..    Objective #C  Patient will identify 3 initial signs or symptoms of anxiety.  Status: Continued - Date(s):1/13/23     Intervention(s)  Therapist will educate patient on treatment options, clarify concerns, work with pt to overcome any resistance to compliance..        Patient has reviewed and agreed to the above plan.      DINESH Whipple  February 17, 2023  Answers for HPI/ROS submitted by the patient on 12/6/2022  TIFFANY 7 TOTAL SCORE: 3

## 2023-02-17 ENCOUNTER — VIRTUAL VISIT (OUTPATIENT)
Dept: BEHAVIORAL HEALTH | Facility: CLINIC | Age: 68
End: 2023-02-17
Payer: COMMERCIAL

## 2023-02-17 DIAGNOSIS — F41.1 GENERALIZED ANXIETY DISORDER: Primary | ICD-10-CM

## 2023-02-17 DIAGNOSIS — F39 MOOD DISORDER (H): ICD-10-CM

## 2023-02-17 DIAGNOSIS — G47.00 INSOMNIA, UNSPECIFIED TYPE: ICD-10-CM

## 2023-02-17 PROCEDURE — 90837 PSYTX W PT 60 MINUTES: CPT | Mod: VID | Performed by: SOCIAL WORKER

## 2023-02-21 NOTE — RESULT ENCOUNTER NOTE
Dear Jolynn,     All your recent labs are normal, improved, or pretty stable from previous.     Please, continue your current medications and/or supplements and follow up as we discussed at your last visit.     For additional lab test information, [email or web address] is an excellent reference.    Thank you so much for choosing [facility].  Please contact us with any questions that you may have.   We appreciate the opportunity to serve you now and look forward to supporting your healthcare needs for a long time to come!    Most Sincerely,     Helga Ibarra MD

## 2023-02-23 ASSESSMENT — ANXIETY QUESTIONNAIRES
6. BECOMING EASILY ANNOYED OR IRRITABLE: NOT AT ALL
GAD7 TOTAL SCORE: 4
GAD7 TOTAL SCORE: 4
1. FEELING NERVOUS, ANXIOUS, OR ON EDGE: NEARLY EVERY DAY
7. FEELING AFRAID AS IF SOMETHING AWFUL MIGHT HAPPEN: NOT AT ALL
3. WORRYING TOO MUCH ABOUT DIFFERENT THINGS: NOT AT ALL
2. NOT BEING ABLE TO STOP OR CONTROL WORRYING: SEVERAL DAYS
8. IF YOU CHECKED OFF ANY PROBLEMS, HOW DIFFICULT HAVE THESE MADE IT FOR YOU TO DO YOUR WORK, TAKE CARE OF THINGS AT HOME, OR GET ALONG WITH OTHER PEOPLE?: SOMEWHAT DIFFICULT
7. FEELING AFRAID AS IF SOMETHING AWFUL MIGHT HAPPEN: NOT AT ALL
GAD7 TOTAL SCORE: 4
IF YOU CHECKED OFF ANY PROBLEMS ON THIS QUESTIONNAIRE, HOW DIFFICULT HAVE THESE PROBLEMS MADE IT FOR YOU TO DO YOUR WORK, TAKE CARE OF THINGS AT HOME, OR GET ALONG WITH OTHER PEOPLE: SOMEWHAT DIFFICULT
5. BEING SO RESTLESS THAT IT IS HARD TO SIT STILL: NOT AT ALL
4. TROUBLE RELAXING: NOT AT ALL

## 2023-02-23 ASSESSMENT — PATIENT HEALTH QUESTIONNAIRE - PHQ9
SUM OF ALL RESPONSES TO PHQ QUESTIONS 1-9: 1
10. IF YOU CHECKED OFF ANY PROBLEMS, HOW DIFFICULT HAVE THESE PROBLEMS MADE IT FOR YOU TO DO YOUR WORK, TAKE CARE OF THINGS AT HOME, OR GET ALONG WITH OTHER PEOPLE: NOT DIFFICULT AT ALL
SUM OF ALL RESPONSES TO PHQ QUESTIONS 1-9: 1

## 2023-02-24 ENCOUNTER — OFFICE VISIT (OUTPATIENT)
Dept: FAMILY MEDICINE | Facility: CLINIC | Age: 68
End: 2023-02-24
Payer: COMMERCIAL

## 2023-02-24 VITALS
TEMPERATURE: 98.4 F | SYSTOLIC BLOOD PRESSURE: 132 MMHG | WEIGHT: 134.6 LBS | OXYGEN SATURATION: 100 % | HEIGHT: 68 IN | BODY MASS INDEX: 20.4 KG/M2 | DIASTOLIC BLOOD PRESSURE: 78 MMHG | RESPIRATION RATE: 16 BRPM | HEART RATE: 96 BPM

## 2023-02-24 DIAGNOSIS — F43.23 ADJUSTMENT DISORDER WITH MIXED ANXIETY AND DEPRESSED MOOD: Primary | ICD-10-CM

## 2023-02-24 DIAGNOSIS — G44.211 INTRACTABLE EPISODIC TENSION-TYPE HEADACHE: ICD-10-CM

## 2023-02-24 PROCEDURE — 99214 OFFICE O/P EST MOD 30 MIN: CPT | Performed by: FAMILY MEDICINE

## 2023-02-24 ASSESSMENT — ANXIETY QUESTIONNAIRES: GAD7 TOTAL SCORE: 4

## 2023-02-24 ASSESSMENT — PATIENT HEALTH QUESTIONNAIRE - PHQ9
SUM OF ALL RESPONSES TO PHQ QUESTIONS 1-9: 1
10. IF YOU CHECKED OFF ANY PROBLEMS, HOW DIFFICULT HAVE THESE PROBLEMS MADE IT FOR YOU TO DO YOUR WORK, TAKE CARE OF THINGS AT HOME, OR GET ALONG WITH OTHER PEOPLE: NOT DIFFICULT AT ALL

## 2023-02-24 NOTE — PROGRESS NOTES
Assessment & Plan       ICD-10-CM    1. Adjustment disorder with mixed anxiety and depressed mood- mild-moderate  at this time  F43.23       2. Intractable episodic tension-type headache  G44.211            Continue current meds.       Prescription drug management  35 minutes spent on the date of the encounter doing chart review, history and exam, documentation and further activities per the note       MEDICATIONS:  Continue current medications without change  Regular exercise  See Patient Instructions    Return in about 1 month (around 3/24/2023) for mood, bp, headaches , w/ Dr. SUE for 40 minute appointment.         Helga Ibarra MD  [facility] [place]        Subjective :   Azalea is a 67 year old, presenting for the following health issues:  Clinic Care Coordination - Follow-up  looking fwd to granddaughter's 4th birthday party.     Still having achey/squeezing  headaches - bitemporal- radiating to bioccipital. Sometimes better with eating. Sometimes not.  Thinks it may be related to ambien and/or the venlafaxine.  Awakens  with a headache 2-3 days a week.  Is starting to keep track of that.  Feels now that the headaches are causing enough distress to change her medication.  At her last virtual visit with Dr. Pompa on 1/19/2023, she was not having that feeling.  She's otherwise really enjoying her life right now.     Still getting a little overwhelmed with all the choices at the grocery store and when there are a lot options with social interactions.        She's currently taking ambien cr 6.25mg.   No numbness, tingling, or weakness in upper or lower extremities. No bowel or bladder control problems. No changes in speech, swallowing, hearing, coordination  or vision.      She's been sleeping 7 hours/night.  Next follow up with Dr. Pompa is scheduled for 3 weeks from now. Was supposed to be this week, but was rescheduled secondary to snow.       Having less anxiety than previous.   Getting more and more done each day. Not getting anxious with social activities anymore.  She's driving and feeling really comfortable with that , but staying off the interstates.      Still seeing her therapist Erika Francisco once a week and really enjoys that.      Has a neuropsych appointment coming up for evaluation secondary to the drug-induced parkinson's .  She called her insurance and it is covered.  I again strongly encouraged her to go and do this.      History of Present Illness       Mental Health Follow-up:  Patient presents to follow-up on Anxiety.    Patient's anxiety since last visit has been:  Medium  The patient is having other symptoms associated with anxiety.  Any significant life events: No  Patient is feeling anxious or having panic attacks.  Patient has no concerns about alcohol or drug use.    She eats 2-3 servings of fruits and vegetables daily.She consumes 0 sweetened beverage(s) daily.She exercises with enough effort to increase her heart rate 10 to 19 minutes per day.  She exercises with enough effort to increase her heart rate 5 days per week.   She is taking medications regularly.    Today's PHQ-9         PHQ-9 Total Score: 1    PHQ-9 Q9 Thoughts of better off dead/self-harm past 2 weeks :   Not at all    How difficult have these problems made it for you to do your work, take care of things at home, or get along with other people: Not difficult at all  Today's TIFFANY-7 Score: 4     Patient Active Problem List   Diagnosis     Essential hypertension with goal blood pressure less than 140/90     Hidradenitis     Localized osteoarthritis of hand     Synovial cyst of popliteal space     DERMATOPHYTOSIS OF NAIL- toenails      Lipidosis     Osteopenia     Postmenopausal     NUMBNESS AND TINGLING OF FOOT - bilateral -mild      Numbness of feet- distal feet R>L      Stress incontinence, female - mild     Cough     Acute bronchitis- recurrent- ? related to mold in school building- pt has since retired  and no further bronchitis     Rosacea     History of migraine headaches- assoc. with nausea/vomiting - resolved around menopause - were  premenstrual      Dupuytren's disease of palm - right 4th digit flexor tendon     Sun-damaged skin     Seasonal allergic rhinitis     Basal cell carcinoma of leg, right     Adjustment disorder with mixed anxiety and depressed mood- mild-moderate  at this time     Family history of celiac disease     Generalized anxiety disorder     Insomnia, unspecified type     Chronic constipation     Raynaud's phenomenon without gangrene     Other secondary osteoarthritis of left knee     Primary localized osteoarthrosis of left lower leg     Hyperlipidemia LDL goal <130     Basal cell carcinoma     Colon polyps     Screening for cervical cancer     Adjustment insomnia     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 - much improved - essentially gone       Drug-induced parkinsonism (H)- Abilify - generic = ARIPiprazole( 5mg)      Mood disorder (H)       Current Outpatient Medications   Medication Sig Dispense Refill     clindamycin (CLINDAGEL) 1 % external gel Apply topically daily 60 g 11     lactobacillus rhamnosus, GG, (CULTURELL) capsule Take 1 capsule by mouth daily Taking every other day       lisinopril (ZESTRIL) 5 MG tablet Takes 1/2 tablet to equal 2.5 mg       venlafaxine (EFFEXOR XR) 150 MG 24 hr capsule Take 1 capsule (150 mg) by mouth At Bedtime 90 capsule 1     zolpidem ER (AMBIEN CR) 6.25 MG CR tablet Take 1 tablet (6.25 mg) by mouth nightly as needed for sleep 90 tablet 0          Allergies   Allergen Reactions     Abilify [Aripiprazole] Other (See Comments)     Suspected drug-induced Parkinsonism      Fosamax Diarrhea     Diarrhea, stomach cramps, jaw pain      Amoxicillin-Pot Clavulanate Itching and Rash     Augmentin Itching and Rash         Review of Systems :   Constitutional, HEENT, cardiovascular, pulmonary, GI, ,  "musculoskeletal, neuro, skin, endocrine and psych systems are negative, except as otherwise noted.      Objective    /78   Pulse 96   Temp 98.4  F (36.9  C) (Tympanic)   Resp 16   Ht 1.715 m (5' 7.5\")   Wt 61.1 kg (134 lb 9.6 oz)   LMP 06/28/2001   SpO2 100%   BMI 20.77 kg/m    Body mass index is 20.77 kg/m .  Physical Exam :   GENERAL: healthy, alert and no distress  EYES: Eyes grossly normal to inspection, PERRL and conjunctivae and sclerae normal  HENT: ear canals and TM's normal, nose and mouth without ulcers or lesions, no temporal artery or occipital area tenderness.   NECK: no adenopathy, no asymmetry, masses, or scars and thyroid normal to palpation  RESP: lungs clear to auscultation - no rales, rhonchi or wheezes  CV: regular rate and rhythm, normal S1 S2, no S3 or S4, no murmur, click or rub, no peripheral edema and peripheral pulses strong  MS: no gross musculoskeletal defects noted, no edema  SKIN: no suspicious lesions or rashes  NEURO: Normal strength and tone, mentation intact and speech normal  PSYCH: mentation appears normal, affect normal/bright.     Lab on 02/08/2023   Component Date Value Ref Range Status     Cholesterol 02/08/2023 222 (H)  <200 mg/dL Final     Triglycerides 02/08/2023 60  <150 mg/dL Final     Direct Measure HDL 02/08/2023 81  >=50 mg/dL Final     LDL Cholesterol Calculated 02/08/2023 129 (H)  <=100 mg/dL Final     Non HDL Cholesterol 02/08/2023 141 (H)  <130 mg/dL Final     25 OH Vitamin D2 02/08/2023 <5  ug/L Final     25 OH Vitamin D3 02/08/2023 27  ug/L Final     25 OH Vit D Total 02/08/2023 <32  20 - 75 ug/L Final    Season, race, dietary intake, and treatment affect the concentration of 25-hydroxy-Vitamin D. Values may decrease during winter months and increase during summer months. Values 20-29 ug/L may indicate Vitamin D insufficiency and values <20 ug/L may indicate Vitamin D deficiency.     Sodium 02/08/2023 141  136 - 145 mmol/L Final     Potassium " 02/08/2023 4.1  3.4 - 5.3 mmol/L Final     Chloride 02/08/2023 103  98 - 107 mmol/L Final     Carbon Dioxide (CO2) 02/08/2023 25  22 - 29 mmol/L Final     Anion Gap 02/08/2023 13  7 - 15 mmol/L Final     Urea Nitrogen 02/08/2023 16.2  8.0 - 23.0 mg/dL Final     Creatinine 02/08/2023 0.62  0.51 - 0.95 mg/dL Final     Calcium 02/08/2023 9.7  8.8 - 10.2 mg/dL Final     Glucose 02/08/2023 95  70 - 99 mg/dL Final     Alkaline Phosphatase 02/08/2023 73  35 - 104 U/L Final     AST 02/08/2023 22  10 - 35 U/L Final     ALT 02/08/2023 19  10 - 35 U/L Final     Protein Total 02/08/2023 6.9  6.4 - 8.3 g/dL Final     Albumin 02/08/2023 4.5  3.5 - 5.2 g/dL Final     Bilirubin Total 02/08/2023 0.5  <=1.2 mg/dL Final     GFR Estimate 02/08/2023 >90  >60 mL/min/1.73m2 Final    eGFR calculated using 2021 CKD-EPI equation.     TSH 02/08/2023 1.65  0.30 - 4.20 uIU/mL Final     WBC Count 02/08/2023 4.8  4.0 - 11.0 10e3/uL Final     RBC Count 02/08/2023 4.46  3.80 - 5.20 10e6/uL Final     Hemoglobin 02/08/2023 13.4  11.7 - 15.7 g/dL Final     Hematocrit 02/08/2023 40.4  35.0 - 47.0 % Final     MCV 02/08/2023 91  78 - 100 fL Final     MCH 02/08/2023 30.0  26.5 - 33.0 pg Final     MCHC 02/08/2023 33.2  31.5 - 36.5 g/dL Final     RDW 02/08/2023 13.0  10.0 - 15.0 % Final     Platelet Count 02/08/2023 261  150 - 450 10e3/uL Final     Discussed the above.

## 2023-02-24 NOTE — PATIENT INSTRUCTIONS
" [facility]  [address] [place] [address]  Office: [phone]   Fax:    [phone]     Pt will call Dr. Pompa to let her know about increased headaches and effects on her days for medication adjustment(s).      Thank you so much or choosing [facility]  for your Health Care. It was a pleasure seeing you at your visit today! Please contact us with any questions or concerns you may have.                   Helga Ibarra MD                              To reach your [facility] [place] care team after hours call:   [phone] press #2 \"to speak with your care team\".  This will get you to our clinic instead of routing to central [facility]  scheduling.     PLEASE NOTE OUR HOURS HAVE CHANGED secondary to COVID-19 coronavirus pandemic, as we are trying to minimize patient exposure to the virus,  which is now widespread in the [facility].  These hours may change with very little notice.  We apologize for any inconvenience.       Our current clinic hours are:          Monday- Thursday   7:00am - 6:00pm  in person.      Friday  7:00am- 5:00pm                       Saturday and Sunday : Closed to in person and virtual visits        We have telephone and virtual visit times available between    7:00am - 6pm on Monday-Friday as well.                                                Phone:  [phone]      Our pharmacy hours: Monday through Friday 8:00am to 5:00pm                        Saturday - 9:00 am to 12 noon       Sunday : Closed.              Phone:  [phone]              ###  Please note: at this time we are not accepting any walk-in visits. ###      There is also information available at our web site:  [email or web address]    If your provider ordered any lab tests and you do not receive the results within 10 business days, please call the clinic.    If you need a medication refill please contact your pharmacy.  Please " allow 3 business days for your refill to be completed.    Our clinic offers telephone visits and e visits.  Please ask one of your team members to explain more.      Use MyChart (secure email communication and access to your chart) to send your primary care provider a message or make an appointment. Ask someone on your Team how to sign up for [employer].

## 2023-02-27 ENCOUNTER — MYC MEDICAL ADVICE (OUTPATIENT)
Dept: PSYCHIATRY | Facility: CLINIC | Age: 68
End: 2023-02-27
Payer: COMMERCIAL

## 2023-02-27 DIAGNOSIS — F41.1 GAD (GENERALIZED ANXIETY DISORDER): Primary | ICD-10-CM

## 2023-02-28 RX ORDER — VENLAFAXINE HYDROCHLORIDE 75 MG/1
CAPSULE, EXTENDED RELEASE ORAL
Qty: 90 CAPSULE | Refills: 1 | Status: SHIPPED | OUTPATIENT
Start: 2023-02-28 | End: 2023-03-09

## 2023-02-28 RX ORDER — VENLAFAXINE HYDROCHLORIDE 37.5 MG/1
CAPSULE, EXTENDED RELEASE ORAL
Qty: 90 CAPSULE | Refills: 1 | Status: SHIPPED | OUTPATIENT
Start: 2023-02-28 | End: 2023-03-09

## 2023-02-28 NOTE — TELEPHONE ENCOUNTER
Received directive from provider regarding medications. RN called patient and reviewed instructions in detail. Patient repeated instructions back correctly. RN will also send instructions to patient via [employer] for reinforcement. RN encourage her to reach out if she has any further questions or concerns. Patient agreed and was thankful.     Bhumika Cedillo RN on 2/28/2023 at 3:55 PM

## 2023-02-28 NOTE — CONFIDENTIAL NOTE
Decreased dose of Effexor-XR. New Rxs sent. I would like the pt to take 150 mg alternated by 112.5 mg every other day (so 150/112.5/150/112.5/150/112.5/etc) until she sees me for follow-up and we can see if there has been any improvement in her sxs. Thanks!

## 2023-02-28 NOTE — TELEPHONE ENCOUNTER
Received Mychart from patient regarding headaches and and concerns it may be related to medications. RN acknowledged receipt of message and that it would be forwarded to the provider. [employer] included.     Bhumika Cedillo RN on 2/28/2023 at 2:12 PM      Last visit:1/19/2023  Next visit: 3/9/2023    Last visit note 1/19/2023:    1/19: Patient overall continuing to improve.  See [facility] note below for additional details and more specifics.  Still having some mild headaches that she does not feel are causing enough distress to change her medication.  She is having a significant amount of improvement and would like to keep her medications the same.  She feels like she is making good progress.  No acute safety concerns.  No SI.  No problematic drug or alcohol use.     Per [facility], DINESH Whipple, during today's team-based visit:  Reports she continues to explore driving more. It has been going really well. Denies anxiety sx associated with driving. Anxiety continues generally in the evening hours. Noticed distraction has been really helpful. Continues utilizing tech discussed in therapy. Has been working out more at work out facility with friends. Discussed changing perspectives when when feeling anxious. Continues to have headaches. Sleep continues to be better around 6.5-7hrs. Often wonders how she is suppose to feel on the medications, not feeling fully herself. Talked about possibly reducing medications taking. Reports more good days than bad days in the past 2-3 weeks. Discussed not having the same energy level she had before    Treatment Plan:    Continue Ambien CR 6.25 mg at bedtime as needed for sleep. Do not take with alcohol or opioid pain medication. Make sure to have 8-10 hours to devote to sleep after taking. Careful/caution for falls in middle of night. Discussed sleep medicine referral previously but patient has declined-could consider in future.     Continue Effexor-XR/venlafaxine  mg daily  for depression and anxiety.     Continue to follow with neurology as they recommend.      Continue all other cares per primary care provider.     Continue all other medications as reviewed per electronic medical record today.     Safety plan reviewed. To the Emergency Department as needed or call after hours crisis line at [phone] or [phone]. [place] Crisis Text Line. Text [place] to 344181 or Suicide LifeLine Chat: suicidepreventionZephyrline.org/chat    Continue individual psychotherapy for additional support and ongoing development of nonpharmacologic coping skills and strategies.    Schedule an appointment with me in 6 weeks or sooner as needed.

## 2023-03-01 NOTE — PROGRESS NOTES
[facility] Integrated Behavioral Health  March 3, 2023      Behavioral Health Clinician Progress Note    Patient Name: Jolynn Haji           Service Type:  Individual      Service Location:   MyChart / Email (patient reached)     Session Start Time: 10:00 am  Session End Time: 10:43 am      Session Length: 38 - 52      Attendees: Patient     Service Modality:  Video Visit:      Provider verified identity through the following two step process.  Patient provided:  Patient is known previously to provider    Telemedicine Visit: The patient's condition can be safely assessed and treated via synchronous audio and visual telemedicine encounter.      Reason for Telemedicine Visit: Patient has requested telehealth visit    Originating Site (Patient Location): Patient's home    Distant Site (Provider Location): [facility] [place]    Consent:  The patient/guardian has verbally consented to: the potential risks and benefits of telemedicine (video visit) versus in person care; bill my insurance or make self-payment for services provided; and responsibility for payment of non-covered services.     Patient would like the video invitation sent by:  My Chart    Mode of Communication:  Video Conference via [employer]    As the provider I attest to compliance with applicable laws and regulations related to telemedicine.    Visit Activities (Refresh list every visit): [facility] Only    Diagnostic Assessment Date: 9/27/22  Treatment Plan Review Date: 4/13/23  See Flowsheets for today's PHQ-9 and TIFFANY-7 results  Previous PHQ-9:   PHQ-9 SCORE 1/13/2023 2/2/2023 2/23/2023   PHQ-9 Total Score - - -   PHQ-9 Total Score MyChart 2 (Minimal depression) 1 (Minimal depression) 1 (Minimal depression)   PHQ-9 Total Score 2 1 1   Some encounter information is confidential and restricted. Go to Review Flowsheets activity to see all data.     Previous TIFFANY-7:   TIFFANY-7 SCORE 1/13/2023 2/2/2023 2/23/2023  "  Total Score - - -   Total Score 1 (minimal anxiety) 4 (minimal anxiety) 4 (minimal anxiety)   Total Score 1 4 4       KARINA LEVEL:  KARINA Score (Last Two) 8/27/2010 2/22/2011   KARINA Raw Score 51 49   Activation Score 91.6 82.8   KARINA Level 4 4       DATA  Extended Session (60+ minutes): No  Interactive Complexity: No  Crisis: No  [facility] Patient: No    Treatment Objective(s) Addressed in This Session:  Target Behavior(s): anxiety and depression    Depressed Mood: Increase interest, engagement, and pleasure in doing things  Decrease frequency and intensity of feeling down, depressed, hopeless  Improve quantity and quality of night time sleep / decrease daytime naps  Feel less tired and more energy during the day   Improve diet, appetite, mindful eating, and / or meal planning  Identify negative self-talk and behaviors: challenge core beliefs, myths, and actions  Improve concentration, focus, and mindfulness in daily activities   Feel less fidgety, restless or slow in daily activities / interpersonal interactions  Anxiety: will experience a reduction in anxiety, will develop more effective coping skills to manage anxiety symptoms, will develop healthy cognitive patterns and beliefs and will increase ability to function adaptively    Current Stressors / Issues:  Reports doing well. States she has been in communication with MD about headaches. Has noticed on days when she has lower dose of med she has more anxiety. She has been examining her anxiety when going out socially. Does not feel it is associated with the socializing itself but rather \"will the anxiety come up.\" Discussed upcoming appt for neuro psych testing. Is hesitant but willing to do testing. May want to talk to MD about it prior appt. Fearful about Drug Induced Parkinson's.       2/17/23  Pt reports she is doing well. Reports anxiety has been better. Able to control it better with accepting and reognition of anxiety. Continues to utilize meditation and review of " "skills taught in therapy. Ice packs has been helping with headaches. Self talk has much improved and has gained more confidence. Anxiety in the evening have improved but still feeling the anxiety at times. She has been wanting to work exercise into her daily routine. Sleep has been also improve. ID values: umm, family, self respect responsibility and kindness.       2/10/23  Pt reports she had such a good time with her trip to her sisters house. Much reduced anxious than expected (compared to home) and sleep was really good. Discussed implementing things that worked at [employer] for home. Continues to have some anxiety when going into store or before events. Reports panic attacks are less than previous week. Discussed triggers for anxiety. Discussed do expectations of self meet values.  ID values from next time and look at expectations for self. Pt asked \"What could I do better to be good enough\". Struggles with being good enough for self. Talked about list of things she wants to achieve in order to feel \"good enough\". One being able to go to places with out wondering if she will be anxious.     Identified Triggers for anxiety and background thought behind it:  More anxious when tired: less bandwidth to utilize skills  Feeling going to be late  Making quick decisions  Technology   organizing big meal for company: what people expect of her  MD visits or waiting for visit: anticipations of events at MD, has not had poor experiences, husbands medical experience   What if something happens to Zach   Packing groceries and people are behind her  Sometimes going out to eat (much better): menu with too many options and having to make decisions  Holiday: anticipation and wanting it to be perfect for people/what ng people expect of her  Dealing with insurance problem: hassle and being out of her control   Being in the unknown about things: Slowness of recovery from anxiety, what ifs  Do people see I am anxious  Large crowd " of people: leading up to and during (highest), people might be noticing her, dont want to stand   *Things that need to be done but are not done(floor needing to be washed, getting decorations down etc): likes list to check off (what to accomplish that week), Not accomplishing goal. Self placed expectations (high standards from growing up), perfectionism (mom perfectionism as a child), some self comparisons to others, taking on too much during a week, and expecting to be like old self all the time (compare self to old self).     2/1/23  Pt is reporting she is doing well. She has been out driving to do shopping and seeing friends. Continues to have improvement with anxiety each day. Continues to have headaches but is wanting to change medications until she gets back from trip to her sisters home. Having some anxiety about spending over night for the first time since COVID. Discussed steps to addressing anxiety at her sisters home and drive up. 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 Became dizzy and become worried about the physical sx she was experiencing. Anxiety grew from that point on. Discussed how she handled it in the moment with redirecting thoughts, accepting anxiety and attempting some of the anxiety tech discussed. Pt will explore other situations in which anxiety tends to arise for next session. Pt has been keeping a journal of her daily experience and will reexamine journal to better understand anxiety. Pt listed several situations she knows brings anxiety.      Identified Triggers:  Feeling rushed  Not wanting to be late  Having others do responsibilities she once carried as her responsibility  Being flexible  Expectations of others  What do others see/think of her when experiencing panic attack    1/23/23  Reports she is doing really well. Reports she will be driving up to see her grandson. She is highly looking forward to this and making [employer] cards. Some  anxiety with going in to the grocery store but not once she gets started shopping. Reports her ultimate goal would be to go to the mall alone with traffic as that has been causing anxiety. Headaches continue to be of concern to her. Discussed further and identified them coming following either a panic attack or after high anxiety. Discussed using sensory tech for elevating the anxiety and also for following headache such as heating pad for neck or eyes. Discussed her conversation with Dr. Pompa about possibly changing meds to help with headaches. She reports relishing in current progress and is not wanting meds to change at this time. Headaches do interfere sometimes with daily life. She will be going to lunch with several college friends. This has caused a bit of anxiety in accepting the invite. Normalized experience for pt.       Progress on Treatment Objective(s) / Homework:  Satisfactory progress - ACTION (Actively working towards change); Intervened by reinforcing change plan / affirming steps taken    Motivational Interviewing    MI Intervention: Expressed Empathy/Understanding, Supported Autonomy, Collaboration, Evocation, Open-ended questions, Change talk (evoked) and Reframe     Change Talk Expressed by the Patient: Desire to change Ability to change Reasons to change Need to change    Provider Response to Change Talk: E - Evoked more info from patient about behavior change, A - Affirmed patient's thoughts, decisions, or attempts at behavior change, R - Reflected patient's change talk and S - Summarized patient's change talk statements      Care Plan review completed: Yes    Medication Review:  No changes to current psychiatric medication(s)    Medication Compliance:  Yes    Changes in Health Issues:   None reported    Chemical Use Review:   Substance Use: Chemical use reviewed, no active concerns identified      Tobacco Use: No current tobacco use.      Assessment: Current Emotional / Mental Status (status  of significant symptoms):  Risk status (Self / Other harm or suicidal ideation)  Patient denies a history of suicidal ideation, suicide attempts, self-injurious behavior, homicidal ideation, homicidal behavior and and other safety concerns  Patient denies current fears or concerns for personal safety.  Patient denies current or recent suicidal ideation or behaviors.  Patient denies current or recent homicidal ideation or behaviors.  Patient denies current or recent self injurious behavior or ideation.  Patient denies other safety concerns.  A safety and risk management plan has not been developed at this time, however patient was encouraged to call [place] [phone] should there be a change in any of these risk factors.    Appearance:   Appropriate   Eye Contact:   Good   Psychomotor Behavior: Normal   Attitude:   Cooperative   Orientation:   All  Speech   Rate / Production: Normal    Volume:  Normal   Mood:    Anxious   Affect:    Appropriate   Thought Content:  Clear   Thought Form:  Coherent  Logical   Insight:    Good     Diagnoses:  1. Generalized anxiety disorder    2. Insomnia, unspecified type    3. Mood disorder (H)        Collateral Reports Completed:  Not Applicable    Plan: (Homework, other):  Patient was given information about behavioral services and encouraged to schedule a follow up appointment with the clinic [facility] in 1 week.  She was also given information about mental health symptoms and treatment options .  CD Recommendations: No indications of CD issues.  Erika Francisco [facility]      ______________________________________________________________________    Integrated Primary Care Behavioral Health Treatment Plan    Patient's Name: Jolynn Haji  YOB: 1955    Date of Creation: 10/5/22  Date Treatment Plan Last Reviewed/Revised: 1/13/23    DSM5 Diagnoses: 300.02 (F41.1) Generalized Anxiety Disorder  Psychosocial / Contextual Factors: Individual Factors high anxiety associated with  depressive sx, interfereing with ability to function as she would like.  .  PROMIS (reviewed every 90 days):     Referral / Collaboration:  Was/were discussed and patient will pursue. - individual therapy    Anticipated number of session for this episode of care: 5-8 sessions  Anticipation frequency of session: Weekly  Anticipated Duration of each session: 38-52 minutes  Treatment plan will be reviewed in 90 days or when goals have been changed.       MeasurableTreatment Goal(s) related to diagnosis / functional impairment(s)  Goal 1: Patient will work with provider to manage symptoms    I will know I've met my goal when when less anxious and feeling down.      Objective #A (Patient Action)    Patient will  attend all sessions.  Status: Continued - Date(s):1/13/23     Intervention(s)  Therapist will teach educate patient on treatment options, clarify concerns, work with pt to overcome any resistance to compliance..    Objective #B  Patient will identify 3 fears / thoughts that contribute to feeling anxious.  Status: Continued - Date(s):1/13/23     Intervention(s)  Therapist will educate patient on treatment options, clarify concerns, work with pt to overcome any resistance to compliance..    Objective #C  Patient will identify 3 initial signs or symptoms of anxiety.  Status: Continued - Date(s):1/13/23     Intervention(s)  Therapist will educate patient on treatment options, clarify concerns, work with pt to overcome any resistance to compliance..        Patient has reviewed and agreed to the above plan.      DINESH Whipple  March 3, 2023  Answers for HPI/ROS submitted by the patient on 12/6/2022  TIFFANY 7 TOTAL SCORE: 3

## 2023-03-03 ENCOUNTER — VIRTUAL VISIT (OUTPATIENT)
Dept: BEHAVIORAL HEALTH | Facility: CLINIC | Age: 68
End: 2023-03-03
Payer: COMMERCIAL

## 2023-03-03 DIAGNOSIS — G47.00 INSOMNIA, UNSPECIFIED TYPE: ICD-10-CM

## 2023-03-03 DIAGNOSIS — F41.1 GENERALIZED ANXIETY DISORDER: Primary | ICD-10-CM

## 2023-03-03 DIAGNOSIS — F39 MOOD DISORDER (H): ICD-10-CM

## 2023-03-03 PROCEDURE — 90834 PSYTX W PT 45 MINUTES: CPT | Mod: VID | Performed by: SOCIAL WORKER

## 2023-03-08 ASSESSMENT — PATIENT HEALTH QUESTIONNAIRE - PHQ9
SUM OF ALL RESPONSES TO PHQ QUESTIONS 1-9: 5
SUM OF ALL RESPONSES TO PHQ QUESTIONS 1-9: 5
10. IF YOU CHECKED OFF ANY PROBLEMS, HOW DIFFICULT HAVE THESE PROBLEMS MADE IT FOR YOU TO DO YOUR WORK, TAKE CARE OF THINGS AT HOME, OR GET ALONG WITH OTHER PEOPLE: NOT DIFFICULT AT ALL

## 2023-03-08 NOTE — PROGRESS NOTES
[facility]  March 9, 2023      Behavioral Health Clinician Progress Note    Patient Name: Jolynn Haji           Service Type:  Individual      Service Location:   MyChart / Email (patient reached)     Session Start Time: 11:00 am  Session End Time: 11:27 am      Session Length: 16 - 37      Attendees: Patient     Service Modality:  Video Visit:      Provider verified identity through the following two step process.  Patient provided:  Patient is known previously to provider    Telemedicine Visit: The patient's condition can be safely assessed and treated via synchronous audio and visual telemedicine encounter.      Reason for Telemedicine Visit: Patient has requested telehealth visit    Originating Site (Patient Location): Patient's home    Distant Site (Provider Location): [facility]    Consent:  The patient/guardian has verbally consented to: the potential risks and benefits of telemedicine (video visit) versus in person care; bill my insurance or make self-payment for services provided; and responsibility for payment of non-covered services.     Patient would like the video invitation sent by:  My Chart    Mode of Communication:  Video Conference via [employer]    As the provider I attest to compliance with applicable laws and regulations related to telemedicine.    Visit Activities (Refresh list every visit): [facility] [place] Only    Diagnostic Assessment Date: 9/27/22  Treatment Plan Review Date: 4/13/23  See Flowsheets for today's PHQ-9 and TIFFANY-7 results  Previous PHQ-9:   PHQ-9 SCORE 1/13/2023 2/2/2023 2/23/2023   PHQ-9 Total Score - - -   PHQ[place]9 [place] [facility] [place] (Minimal depression) 1 (Minimal depression) 1 (Minimal depression)   PHQ-9 Total Score 2 1 1   Some encounter information is confidential and restricted. Go to Review Flowsheets activity to see all data.     Previous TIFFANY-7:   TIFFANY-7 SCORE 1/13/2023 2/2/2023 2/23/2023   Total Score  "- - -   Total Score 1 (minimal anxiety) 4 (minimal anxiety) 4 (minimal anxiety)   Total Score 1 4 4       KARINA LEVEL:  KARNIA Score (Last Two) 8/27/2010 2/22/2011   KARINA Raw Score 51 49   Activation Score 91.6 82.8   KARINA Level 4 4       DATA  Extended Session (60+ minutes): No  Interactive Complexity: No  Crisis: No  [facility] Patient: No    Treatment Objective(s) Addressed in This Session:  Target Behavior(s): anxiety and depression    Depressed Mood: Increase interest, engagement, and pleasure in doing things  Decrease frequency and intensity of feeling down, depressed, hopeless  Improve quantity and quality of night time sleep / decrease daytime naps  Feel less tired and more energy during the day   Improve diet, appetite, mindful eating, and / or meal planning  Identify negative self-talk and behaviors: challenge core beliefs, myths, and actions  Improve concentration, focus, and mindfulness in daily activities   Feel less fidgety, restless or slow in daily activities / interpersonal interactions  Anxiety: will experience a reduction in anxiety, will develop more effective coping skills to manage anxiety symptoms, will develop healthy cognitive patterns and beliefs and will increase ability to function adaptively    Current Stressors / Issues:  She has been more anxious on the different dosing of medications. She has also not noticed decrease in headaches. Discussed neck tension and using body scanning. She continues to feel a bit foggy in the afternoons.     3/3/23  Reports doing well. States she has been in communication with MD about headaches. Has noticed on days when she has lower dose of med she has more anxiety. She has been examining her anxiety when going out socially. Does not feel it is associated with the socializing itself but rather \"will the anxiety come up.\" Discussed upcoming appt for neuro psych testing. Is hesitant but willing to do testing. May want to talk to MD about it prior appt. Fearful about Drug " "Induced Parkinson's.       2/17/23  Pt reports she is doing well. Reports anxiety has been better. Able to control it better with accepting and reognition of anxiety. Continues to utilize meditation and review of skills taught in therapy. Ice packs has been helping with headaches. Self talk has much improved and has gained more confidence. Anxiety in the evening have improved but still feeling the anxiety at times. She has been wanting to work exercise into her daily routine. Sleep has been also improve. ID values: umm, family, self respect responsibility and kindness.       2/10/23  Pt reports she had such a good time with her trip to her sisters house. Much reduced anxious than expected (compared to home) and sleep was really good. Discussed implementing things that worked at [employer] for home. Continues to have some anxiety when going into store or before events. Reports panic attacks are less than previous week. Discussed triggers for anxiety. Discussed do expectations of self meet values.  ID values from next time and look at expectations for self. Pt asked \"What could I do better to be good enough\". Struggles with being good enough for self. Talked about list of things she wants to achieve in order to feel \"good enough\". One being able to go to places with out wondering if she will be anxious.     Identified Triggers for anxiety and background thought behind it:  More anxious when tired: less bandwidth to utilize skills  Feeling going to be late  Making quick decisions  Technology   organizing big meal for company: what people expect of her  MD visits or waiting for visit: anticipations of events at MD, has not had poor experiences, husbands medical experience   What if something happens to Zach   Packing groceries and people are behind her  Sometimes going out to eat (much better): menu with too many options and having to make decisions  Holiday: anticipation and wanting it to be perfect for people/what ng " people expect of her  Dealing with insurance problem: hassle and being out of her control   Being in the unknown about things: Slowness of recovery from anxiety, what ifs  Do people see I am anxious  Large crowd of people: leading up to and during (highest), people might be noticing her, dont want to stand   *Things that need to be done but are not done(floor needing to be washed, getting decorations down etc): likes list to check off (what to accomplish that week), Not accomplishing goal. Self placed expectations (high standards from growing up), perfectionism (mom perfectionism as a child), some self comparisons to others, taking on too much during a week, and expecting to be like old self all the time (compare self to old self).     2/1/23  Pt is reporting she is doing well. She has been out driving to do shopping and seeing friends. Continues to have improvement with anxiety each day. Continues to have headaches but is wanting to change medications until she gets back from trip to her sisters home. Having some anxiety about spending over night for the first time since COVID. Discussed steps to addressing anxiety at her sisters home and drive up. 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 Became dizzy and become worried about the physical sx she was experiencing. Anxiety grew from that point on. Discussed how she handled it in the moment with redirecting thoughts, accepting anxiety and attempting some of the anxiety tech discussed. Pt will explore other situations in which anxiety tends to arise for next session. Pt has been keeping a journal of her daily experience and will reexamine journal to better understand anxiety. Pt listed several situations she knows brings anxiety.      Identified Triggers:  Feeling rushed  Not wanting to be late  Having others do responsibilities she once carried as her responsibility  Being flexible  Expectations of others  What do others see/think  of her when experiencing panic attack    1/23/23  Reports she is doing really well. Reports she will be driving up to see her grandson. She is highly looking forward to this and making valentines cards. Some anxiety with going in to the grocery store but not once she gets started shopping. Reports her ultimate goal would be to go to the mall alone with traffic as that has been causing anxiety. Headaches continue to be of concern to her. Discussed further and identified them coming following either a panic attack or after high anxiety. Discussed using sensory tech for elevating the anxiety and also for following headache such as heating pad for neck or eyes. Discussed her conversation with Dr. Pompa about possibly changing meds to help with headaches. She reports relishing in current progress and is not wanting meds to change at this time. Headaches do interfere sometimes with daily life. She will be going to lunch with several college friends. This has caused a bit of anxiety in accepting the invite. Normalized experience for pt.       Progress on Treatment Objective(s) / Homework:  Satisfactory progress - ACTION (Actively working towards change); Intervened by reinforcing change plan / affirming steps taken    Motivational Interviewing    MI Intervention: Expressed Empathy/Understanding, Supported Autonomy, Collaboration, Evocation, Open-ended questions, Change talk (evoked) and Reframe     Change Talk Expressed by the Patient: Desire to change Ability to change Reasons to change Need to change    Provider Response to Change Talk: E - Evoked more info from patient about behavior change, A - Affirmed patient's thoughts, decisions, or attempts at behavior change, R - Reflected patient's change talk and S - Summarized patient's change talk statements      Care Plan review completed: Yes    Medication Review:  No changes to current psychiatric medication(s)    Medication Compliance:  Yes    Changes in Health  Issues:   None reported    Chemical Use Review:   Substance Use: Chemical use reviewed, no active concerns identified      Tobacco Use: No current tobacco use.      Assessment: Current Emotional / Mental Status (status of significant symptoms):  Risk status (Self / Other harm or suicidal ideation)  Patient denies a history of suicidal ideation, suicide attempts, self-injurious behavior, homicidal ideation, homicidal behavior and and other safety concerns  Patient denies current fears or concerns for personal safety.  Patient denies current or recent suicidal ideation or behaviors.  Patient denies current or recent homicidal ideation or behaviors.  Patient denies current or recent self injurious behavior or ideation.  Patient denies other safety concerns.  A safety and risk management plan has not been developed at this time, however patient was encouraged to call [place] [phone] should there be a change in any of these risk factors.    Appearance:   Appropriate   Eye Contact:   Good   Psychomotor Behavior: Normal   Attitude:   Cooperative   Orientation:   All  Speech   Rate / Production: Normal    Volume:  Normal   Mood:    Anxious   Affect:    Appropriate   Thought Content:  Clear   Thought Form:  Coherent  Logical   Insight:    Good     Diagnoses:  1. Generalized anxiety disorder    2. Insomnia, unspecified type    3. Mood disorder (H)        Collateral Reports Completed:  Not Applicable    Plan: (Homework, other):  Patient was given information about behavioral services and encouraged to schedule a follow up appointment with the clinic [facility] in 1 week.  She was also given information about mental health symptoms and treatment options .  CD Recommendations: No indications of CD issues.  Erika Francisco [facility] LICSW      ______________________________________________________________________    Integrated Primary Care Behavioral Health Treatment Plan    Patient's Name: Jolynn Haji  YOB: 1955    Date of  Creation: 10/5/22  Date Treatment Plan Last Reviewed/Revised: 1/13/23    DSM5 Diagnoses: 300.02 (F41.1) Generalized Anxiety Disorder  Psychosocial / Contextual Factors: Individual Factors high anxiety associated with depressive sx, interfereing with ability to function as she would like.  .  PROMIS (reviewed every 90 days):     Referral / Collaboration:  Was/were discussed and patient will pursue. - individual therapy    Anticipated number of session for this episode of care: 5-8 sessions  Anticipation frequency of session: Weekly  Anticipated Duration of each session: 38-52 minutes  Treatment plan will be reviewed in 90 days or when goals have been changed.       MeasurableTreatment Goal(s) related to diagnosis / functional impairment(s)  Goal 1: Patient will work with provider to manage symptoms    I will know I've met my goal when when less anxious and feeling down.      Objective #A (Patient Action)    Patient will  attend all sessions.  Status: Continued - Date(s):1/13/23     Intervention(s)  Therapist will teach educate patient on treatment options, clarify concerns, work with pt to overcome any resistance to compliance..    Objective #B  Patient will identify 3 fears / thoughts that contribute to feeling anxious.  Status: Continued - Date(s):1/13/23     Intervention(s)  Therapist will educate patient on treatment options, clarify concerns, work with pt to overcome any resistance to compliance..    Objective #C  Patient will identify 3 initial signs or symptoms of anxiety.  Status: Continued - Date(s):1/13/23     Intervention(s)  Therapist will educate patient on treatment options, clarify concerns, work with pt to overcome any resistance to compliance..        Patient has reviewed and agreed to the above plan.      DINESH Whipple  March 9, 2023  Answers for HPI/ROS submitted by the patient on 12/6/2022  TIFFANY 7 TOTAL SCORE: 3  Answers for HPI/ROS submitted by the patient on 3/8/2023  If you checked off any  problems, how difficult have these problems made it for you to do your work, take care of things at home, or get along with other people?: Not difficult at all  PHQ9 TOTAL SCORE: 5

## 2023-03-09 ENCOUNTER — VIRTUAL VISIT (OUTPATIENT)
Dept: PSYCHIATRY | Facility: CLINIC | Age: 68
End: 2023-03-09
Payer: COMMERCIAL

## 2023-03-09 ENCOUNTER — VIRTUAL VISIT (OUTPATIENT)
Dept: BEHAVIORAL HEALTH | Facility: CLINIC | Age: 68
End: 2023-03-09
Payer: COMMERCIAL

## 2023-03-09 DIAGNOSIS — F41.1 GENERALIZED ANXIETY DISORDER: Primary | ICD-10-CM

## 2023-03-09 DIAGNOSIS — F41.1 GAD (GENERALIZED ANXIETY DISORDER): Primary | ICD-10-CM

## 2023-03-09 DIAGNOSIS — R25.1 TREMOR: ICD-10-CM

## 2023-03-09 DIAGNOSIS — G47.00 INSOMNIA, UNSPECIFIED TYPE: ICD-10-CM

## 2023-03-09 DIAGNOSIS — F39 MOOD DISORDER (H): ICD-10-CM

## 2023-03-09 PROCEDURE — 99214 OFFICE O/P EST MOD 30 MIN: CPT | Mod: VID | Performed by: PSYCHIATRY & NEUROLOGY

## 2023-03-09 PROCEDURE — 90832 PSYTX W PT 30 MINUTES: CPT | Mod: VID | Performed by: SOCIAL WORKER

## 2023-03-09 RX ORDER — PROPRANOLOL HYDROCHLORIDE 10 MG/1
10 TABLET ORAL 2 TIMES DAILY
Qty: 60 TABLET | Refills: 1 | Status: SHIPPED | OUTPATIENT
Start: 2023-03-09 | End: 2023-03-23

## 2023-03-09 RX ORDER — VENLAFAXINE HYDROCHLORIDE 150 MG/1
150 CAPSULE, EXTENDED RELEASE ORAL DAILY
COMMUNITY
End: 2023-05-11

## 2023-03-09 NOTE — Clinical Note
Hi there! Just an FYI that I started low-dose propranolol for anxiety, headaches, and tremor. I know she is on lisinopril and so just starting low with propranolol to see if helpful at all and to ensure tolerability. Her vitals were checked recently and didn't look too soft if anything:) Let me know if you have concerns! -Judy

## 2023-03-09 NOTE — Clinical Note
Please call this patient to get them scheduled for a follow-up visit in 2 weeks. Please schedule with me and the [facility]. Thanks!

## 2023-03-09 NOTE — PROGRESS NOTES
"Telemedicine Visit: The patient's condition can be safely assessed and treated via synchronous audio and visual telemedicine encounter.      Reason for Telemedicine Visit: Patient has requested telehealth visit    Originating Site (Patient Location): Patient's home    Distant Location (provider location):  Off-site    Consent:  The patient/guardian has verbally consented to: the potential risks and benefits of telemedicine (video visit) versus in person care; bill my insurance or make self-payment for services provided; and responsibility for payment of non-covered services.     Mode of Communication:  Video Conference via [employer]    As the provider I attest to compliance with applicable laws and regulations related to telemedicine.        Outpatient Psychiatric Progress Note    Name: Jolynn Haji   : 1955                    Primary Care Provider: Helga Ibarra MD   [facility]    PHQ-9 scores:  PHQ-9 SCORE 2023 2023 3/8/2023   PHQ-9 Total Score - - -   PHQ-9 Total Score MyChart 1 (Minimal depression) 1 (Minimal depression) 5 (Mild depression)   PHQ-9 Total Score 1 1 5   Some encounter information is confidential and restricted. Go to Review Flowsheets activity to see all data.       TIFFANY-7 scores:  TIFFANY-7 SCORE 2023   Total Score - - -   Total Score 1 (minimal anxiety) 4 (minimal anxiety) 4 (minimal anxiety)   Total Score 1 4 4       Patient Identification:  Patient is a 67 year old,   White Choose not to answer female  who presents for return visit with me.  Patient is currently retired. Patient attended the phone/video session with her daughter Malini rucker. Patient prefers to be called: \" Azalea\".    Interim History:  I last saw Jolynn Haji for outpatient psychiatry return visit on 2023. During that appointment, we:     Continue Ambien CR 6.25 mg at bedtime as needed for sleep. Do not take with alcohol or opioid pain " "medication. Make sure to have 8-10 hours to devote to sleep after taking. Careful/caution for falls in middle of night. Discussed sleep medicine referral previously but patient has declined-could consider in future.     Continue Effexor-XR/venlafaxine  mg daily for depression and anxiety.     Continue to follow with neurology as they recommend.      Continue all other cares per primary care provider.     3/9: Since last visit we decreased Effexor-XR to 112.5 mg alternated by 150 mg every other day.  Unfortunately patient reports her headaches have not improved and she is now more anxious.  She is wondering if she can go back up on Effexor-XR.  No acute safety concerns.  No SI.  No problematic drug or alcohol use.    Per [facility], Erika Francisco [facility], during today's team-based visit:  She has been more anxious on the different dosing of medications. She has also not noticed decrease in headaches. Discussed neck tension and using body scanning. She continues to feel a bit foggy in the afternoons.       Past Psychiatric Med Trials:  Psych Meds at Intake:  lexapro 20 mg daily  abilify 5 mg daily      Past Psych Meds:  Amitriptyline  Quetiapine  Mirtazapine - stopped \"because I was sleeping through the night\"    Psychiatric ROS:  Jolynn Haji reports mood has been: A little worse on decreased dose of Effexor-XR  Anxiety has been: Worse on decreased dose of Effexor-XR  Sleep has been: See HPI above  Seema sxs: None  Psychosis sxs: N/A  ADHD/ADD sxs: N/A  PTSD sxs: N/A  PHQ9 and GAD7 scores were reviewed today if completed.   Medication side effects: Some fatigue and headaches late afternoon (patient takes medication at bedtime)  Current stressors include: Symptoms and See HPI above  Coping mechanisms and supports include: Family, Hobbies and Friends    Current medications include:   Current Outpatient Medications   Medication Sig     clindamycin (CLINDAGEL) 1 % external gel Apply topically daily     lactobacillus " rhamnosus, GG, (CULTURELL) capsule Take 1 capsule by mouth daily Taking every other day     lisinopril (ZESTRIL) 5 MG tablet Takes 1/2 tablet to equal 2.5 mg     venlafaxine (EFFEXOR XR) 37.5 MG 24 hr capsule Take one cap by mouth daily WITH 75 mg cap for total daily dose 112.5 mg.     venlafaxine (EFFEXOR XR) 75 MG 24 hr capsule Take one cap by mouth daily WITH 37.5 mg cap for total daily dose 112.5 mg.     zolpidem ER (AMBIEN CR) 6.25 MG CR tablet Take 1 tablet (6.25 mg) by mouth nightly as needed for sleep     Current Facility-Administered Medications   Medication     lidocaine 1 % 4 mL     lidocaine 1 % injection 3 mL     lidocaine 1 % injection 4 mL     triamcinolone (KENALOG-40) injection 40 mg       Past Medical/Surgical History:  Past Medical History:   Diagnosis Date     Abnormal glandular Papanicolaou smear of cervix- ASCUS in 2000 12/5/2003    ASC H- 2000     Basal cell carcinoma of leg, right 05/2016    Dr Rogers     Colon polyps 05/2018    tubular adenoam - due 5 yrs - 2 mm     Concussion without loss of consciousness 4/12/2017     Hidradenitis     left  groin     HSIL (high grade squamous intraepithelial lesion) on Pap smear of cervix 7/5/2000    Normal paps from 2001 to 2016/cc     Hypertension goal BP (blood pressure) < 140/90      OA (osteoarthritis) of knee     moderate     Postmenopausal since 7/2008      Shingles 7/23/2010    left      Squamous cell carcinoma of skin, unspecified      Synovial cyst of popliteal space       has a past medical history of Abnormal glandular Papanicolaou smear of cervix- ASCUS in 2000 (12/5/2003), Basal cell carcinoma of leg, right (05/2016), Colon polyps (05/2018), Concussion without loss of consciousness (4/12/2017), Hidradenitis, HSIL (high grade squamous intraepithelial lesion) on Pap smear of cervix (7/5/2000), Hypertension goal BP (blood pressure) < 140/90, OA (osteoarthritis) of knee, Postmenopausal (since 7/2008 ), Shingles (7/23/2010), Squamous cell  carcinoma of skin, unspecified, and Synovial cyst of popliteal space.    She has no past medical history of Malignant melanoma (H) or Squamous cell carcinoma.    Social History:  Reviewed. No changes to social history except as noted above in HPI.    Vital Signs:   None. This is phone/video visit.     Labs:  Most recent laboratory results reviewed and the pertinent results include:   TSH   Date Value Ref Range Status   02/08/2023 1.65 0.30 - 4.20 uIU/mL Final   04/25/2022 1.30 0.40 - 4.00 mU/L Final   01/13/2021 2.07 0.40 - 4.00 mU/L Final     Lab Results   Component Value Date    WBC 8.1 04/25/2022    WBC 6.4 01/13/2021     Lab Results   Component Value Date    RBC 4.58 04/25/2022    RBC 4.54 01/13/2021     Lab Results   Component Value Date    HGB 13.6 04/25/2022    HGB 13.3 01/13/2021     Lab Results   Component Value Date    HCT 41.1 04/25/2022    HCT 41.7 01/13/2021     No components found for: MCT  Lab Results   Component Value Date    MCV 90 04/25/2022    MCV 92 01/13/2021     Lab Results   Component Value Date    MCH 29.7 04/25/2022    MCH 29.3 01/13/2021     Lab Results   Component Value Date    MCHC 33.1 04/25/2022    MCHC 31.9 01/13/2021     Lab Results   Component Value Date    RDW 12.9 04/25/2022    RDW 13.1 01/13/2021     Lab Results   Component Value Date     04/25/2022     01/13/2021     Last Comprehensive Metabolic Panel:  Sodium   Date Value Ref Range Status   02/08/2023 141 136 - 145 mmol/L Final   01/13/2021 140 133 - 144 mmol/L Final     Potassium   Date Value Ref Range Status   02/08/2023 4.1 3.4 - 5.3 mmol/L Final   04/25/2022 4.4 3.4 - 5.3 mmol/L Final   01/13/2021 4.1 3.4 - 5.3 mmol/L Final     Chloride   Date Value Ref Range Status   02/08/2023 103 98 - 107 mmol/L Final   04/25/2022 105 94 - 109 mmol/L Final   01/13/2021 108 94 - 109 mmol/L Final     Carbon Dioxide   Date Value Ref Range Status   01/13/2021 28 20 - 32 mmol/L Final     Carbon Dioxide (CO2)   Date Value Ref  Range Status   02/08/2023 25 22 - 29 mmol/L Final   04/25/2022 26 20 - 32 mmol/L Final     Anion Gap   Date Value Ref Range Status   02/08/2023 13 7 - 15 mmol/L Final   04/25/2022 6 3 - 14 mmol/L Final   01/13/2021 4 3 - 14 mmol/L Final     Glucose   Date Value Ref Range Status   02/08/2023 95 70 - 99 mg/dL Final   04/25/2022 104 (H) 70 - 99 mg/dL Final   01/13/2021 91 70 - 99 mg/dL Final     Comment:     Fasting specimen     Urea Nitrogen   Date Value Ref Range Status   02/08/2023 16.2 8.0 - 23.0 mg/dL Final   04/25/2022 23 7 - 30 mg/dL Final   01/13/2021 18 7 - 30 mg/dL Final     Creatinine   Date Value Ref Range Status   02/08/2023 0.62 0.51 - 0.95 mg/dL Final   01/13/2021 0.68 0.52 - 1.04 mg/dL Final     GFR Estimate   Date Value Ref Range Status   02/08/2023 >90 >60 mL/min/1.73m2 Final     Comment:     eGFR calculated using 2021 CKD-EPI equation.   01/13/2021 >90 >60 mL/min/[1.73_m2] Final     Comment:     Non  GFR Calc  Starting 12/18/2018, serum creatinine based estimated GFR (eGFR) will be   calculated using the Chronic Kidney Disease Epidemiology Collaboration   (CKD-EPI) equation.       Calcium   Date Value Ref Range Status   02/08/2023 9.7 8.8 - 10.2 mg/dL Final   01/13/2021 8.8 8.5 - 10.1 mg/dL Final     Bilirubin Total   Date Value Ref Range Status   02/08/2023 0.5 <=1.2 mg/dL Final   01/13/2021 0.8 0.2 - 1.3 mg/dL Final     Alkaline Phosphatase   Date Value Ref Range Status   02/08/2023 73 35 - 104 U/L Final   01/13/2021 72 40 - 150 U/L Final     ALT   Date Value Ref Range Status   02/08/2023 19 10 - 35 U/L Final   01/13/2021 17 0 - 50 U/L Final     AST   Date Value Ref Range Status   02/08/2023 22 10 - 35 U/L Final   01/13/2021 13 0 - 45 U/L Final     Review of Systems:  10 systems (general, cardiovascular, respiratory, eyes, ENT, endocrine, GI, , M/S, neurological) were reviewed. Most pertinent finding(s) is/are: Some chronic pains, see HPI above. The remaining systems are all  unremarkable.    Mental Status Examination (limited as this is by phone/video):  Appearance: Awake, alert, appears stated age, no acute distress, well-groomed   Attitude:  cooperative, pleasant   Motor: No gross abnormalities noted today.  Not formally tested  Oriented to:  person, place, time, and situation  Attention Span and Concentration:  normal  Speech:  Still a little delayed/hesitant responses-seems to continue to improve, normal volume  Language: intact  Mood: More anxious  Affect: A little restricted still but overall mood congruent  Associations:  no loose associations  Thought Process:  logical, linear and goal oriented  Thought Content:  no evidence of suicidal ideation or homicidal ideation, no evidence of psychotic thought, no auditory hallucinations present and no visual hallucinations present  Recent and Remote Memory:  Not formally assessed. Possibly some deficits? Could consider neuropsychological testing in future  Fund of Knowledge: appropriate  Insight: Good  Judgment:  intact, adequate for safety  Impulse Control:  intact    Suicide Risk Assessment:  Today Jolynn Haji reports no suicidal ideation. Based on all available evidence including the factors cited above, Jolynn Haji does not appear to be at imminent risk for self-harm, does not meet criteria for a 72-hr hold, and therefore remains appropriate for ongoing outpatient level of care.  A thorough assessment of risk factors related to suicide and self-harm have been reviewed and are noted above. The patient convincingly denies suicidality on several occasions. Local community safety resources reviewed for patient to use if needed. There was no deceit detected, and the patient presented in a manner that was believable.     DSM5 Diagnosis:  300.02 (F41.1) Generalized Anxiety Disorder   R/O Mood Disorder  Insomnia, unspecified  H/O TBI    Suspected drug-induced parkinsonism  R/O Underlying Parkinson's Disease (pt working with  neurology)    Medical comorbidities include:   Patient Active Problem List    Diagnosis Date Noted     Essential hypertension with goal blood pressure less than 140/90 07/16/2001     Priority: High     Drug-induced parkinsonism (H)- Abilify - generic = ARIPiprazole( 5mg)  01/02/2023     Priority: Medium     Mood disorder (H) 01/02/2023     Priority: Medium     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 - much improved - essentially gone   07/07/2022     Priority: Medium     Adjustment insomnia 12/23/2021     Priority: Medium     Screening for cervical cancer      Priority: Medium     1999 NIL  2000 ASCUS-H.  >> Colpo: Neg  2001 NIL   2003 NIL pap, Neg HPV  [id number], 2016  NIL pap  7/25/19 NIL pap, Neg HPV    Criteria necessary to stop screening per ASCCP guidelines:  Older than 65 years  Three consecutive negative cytology results or two consecutive negative cotest results within the previous 10 years, with the most recent being performed within the last 5 years.   (Women with hx of MADDIE 2 or 3, or adenocarcinoma in situ should continue screening for full 20 years, even if this goes past age 65).         Hyperlipidemia LDL goal <130 11/30/2018     Priority: Medium     Basal cell carcinoma      Priority: Medium     Colon polyps 05/01/2018     Priority: Medium     Primary localized osteoarthrosis of left lower leg 02/19/2018     Priority: Medium     Other secondary osteoarthritis of left knee 01/29/2018     Priority: Medium     Chronic constipation 09/14/2017     Priority: Medium     Raynaud's phenomenon without gangrene 09/14/2017     Priority: Medium     Insomnia, unspecified type 07/25/2017     Priority: Medium     Generalized anxiety disorder 05/23/2017     Priority: Medium     Adjustment disorder with mixed anxiety and depressed mood- mild-moderate  at this time 05/11/2017     Priority: Medium     Family history of celiac disease 05/11/2017     Priority:  Medium     Basal cell carcinoma of leg, right 05/01/2016     Priority: Medium     Sun-damaged skin 04/21/2016     Priority: Medium     Seasonal allergic rhinitis 04/21/2016     Priority: Medium     Dupuytren's disease of palm - right 4th digit flexor tendon 06/11/2015     Priority: Medium     History of migraine headaches- assoc. with nausea/vomiting - resolved around menopause - were  premenstrual  01/08/2014     Priority: Medium     Rosacea 05/01/2013     Priority: Medium     Numbness of feet- distal feet R>L  02/21/2011     Priority: Medium     NUMBNESS AND TINGLING OF FOOT - bilateral -mild  08/25/2010     Priority: Medium     Osteopenia 01/02/2009     Priority: Medium     Lipidosis 10/24/2006     Priority: Medium     Problem list name updated by automated process. Provider to review       Localized osteoarthritis of hand 11/01/2005     Priority: Medium     Problem list name updated by automated process. Provider to review       Stress incontinence, female - mild 10/23/2012     Priority: Low     Cough 10/23/2012     Priority: Low     Acute bronchitis- recurrent- ? related to mold in school building- pt has since retired and no further bronchitis 10/23/2012     Priority: Low     Postmenopausal      Priority: Low     DERMATOPHYTOSIS OF NAIL- toenails  03/14/2006     Priority: Low     trichophyton rubrum on culture - 1/06        Synovial cyst of popliteal space      Priority: Low     Hidradenitis 12/05/2003     Priority: Low       Psychosocial & Contextual Factors: see HPI above    Assessment:  From Intake, 5/12/2022:  Jolynn Haji is a 66-year-old female with a history including anxiety, depression, insomnia status post concussion in April 2017.  No major mental health struggles prior to concussion in 2017.  Patient was started on Lexapro, quetiapine, mirtazapine to help with her symptoms.  Patient had been feeling much better on that medication combination and once she was sleeping well again quetiapine and  mirtazapine were discontinued.  This past January the patient started to have some worsening symptoms and Abilify was added.  Patient has felt like her current medications of Lexapro and Abilify have been helpful but reports starting to feel really slowed and numb the past several weeks.  She also reported a weight loss of nearly 20 pounds over the last 6 weeks versus several months (patient was not very sure). I am wondering if she could be experiencing side effects of her increased Lexapro dose.  She is agreeable to decreasing her dose slightly to 15 mg daily.  Could also consider decreasing Abilify in the case it is not her Lexapro.  She also desires to sleep a little bit better than she has been.  Could consider restarting mirtazapine at bedtime for sleep and to also augment her Lexapro.  Could also be beneficial for patient to undergo cognitive screening to check in and see what her baseline is status post concussion.  No acute safety concerns.  No SI.  No problematic drug or alcohol use.    5/24/2022:  Patient with some slight improvements of possible medication related side effects after decreasing Lexapro.  I am wondering if there might be more robust improvements with decreasing Abilify/aripiprazole.  Possible drug-induced Parkinsonism?  She does seem to have delayed/slowed speech, masked facies.  Her gait and other movements unable to be observed but she does report other struggles that may be consistent with such a presentation.  We are going to decrease her Abilify down to 2 mg daily and I will see her back in just a few weeks.  We will likely discontinue her Abilify at her next visit.  I will message her primary care provider to get some collateral regarding previous baseline functioning prior to starting Abilify.  History of TBI could be confounding presentation.  Could consider neurology consult.    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  Instructed to stop Abilify  today.  We will monitor for improvement of symptoms.  If symptoms do not improve may need to refer to neurology.  If symptoms start to improve we will consider low-dose methylphenidate augmentation of Lexapro next visit to help with low energy, mood, focus and concentration, cognition, s/p TBI if still clinically indicated.  We would discuss risks and benefits with patient and her daughter at that time.  No other acute safety concerns or SI.  No problematic drug or alcohol use.  If patient starts to struggle with sleep would restart mirtazapine at bedtime.    7/28/2022:  Pt still with sxs consistent with drug-induced Parksinsonism.  Neurology consult will be placed so that patient might get scheduled sooner than later due to the typically long wait.  We will start Ambien in hopes we can get her sleeping much better.  Discussed risks and benefits of its use, particularly in the geriatric population.  Lexapro has not been as helpful as it helped for her anxiety.  We will taper this medication and start Effexor/venlafaxine in hopes that might be more effective for her symptoms.  No acute safety concerns.  No SI.  No problematic drug or alcohol use.    8/11/2022:  Overall with continued improved symptoms.  Hopefully patient will continue to have improvement of her symptoms off of Abilify.  Patient will be seeing neurology soon.  Encouraged to keep appointment.  We will continue taper off of Lexapro and continue on monotherapy with venlafaxine.  We will also continue Ambien at this time for sleep since patient is sleeping much better on the medication with improved symptoms and tolerating well.  No acute safety concerns.  No problematic drug or alcohol use.    9/1/2022:  Ongoing anxiety, still intense at times.  Still not sleeping very well.  Could consider sleep evaluation, but patient quite overwhelmed right now with various referrals and doctors appointments.  Briseyda had been working quite well but not keeping her  sleep now.  Still falling asleep relatively well.  We will transition to Ambien CR to see if that is more helpful.  We will continue to consider sleep evaluation to rule out possibility of REM behavior sleep disorder.  We will also transition venlafaxine to bedtime to see if that helps with any possible nausea.  Working with neurology to monitor for Parkinson's disease.  I do recommend neuropsychological testing due to ongoing possible cognitive difficulties/struggles.  No acute safety concerns.  No SI.  No problematic drug or alcohol use.    10/3/2022:  Patient overall with some ongoing anxiety but slowly improving since it got worse late August.  Recommend continuing to optimize Effexor-XR, especially since patient tolerating well.  No longer having any nausea now that dose is taken at bedtime.  Patient feeling a little foggy and off balance with Ambien CR and so we will switch back to Ambien immediate release.  No falls.  Recommend increasing Effexor-XR first and once well tolerated switching back to Ambien immediate release.  May need to continue to work on alternative options for sleep if does not do well back on Ambien immediate release.  No acute safety concerns.  No SI.  No problematic drug or alcohol use.  Encouraged to continue working with neurology.  Next appointment in November.    10/24/2022:  Patient overall with some improved anxiety on increased dose of Effexor-XR.  Could use additional improvement of anxiety and so we will continue to optimize therapy with Effexor-XR.  We will increase dose to 150 mg daily.  Tolerating well with no notable negative side effects.  Patient transitioned back to Ambien CR and is sleeping better on this compared to Ambien immediate release.  We will continue Ambien CR for sleep.  Discussed possibility of sleep medicine referral but patient declines at this time.  Could consider in the future.  No acute safety concerns.  No SI.  No problematic drug or alcohol use.   Encouraged to continue working with neurology.    12/09/2022:  Patient overall doing fairly well.  Feels like increased dose of venlafaxine has been helpful.  I do wonder if she is having some subtle withdrawal symptoms late in the afternoon with headache and fatigue.  Discussed shifting her dose to the morning.  However, she reports she will try to drink more water and see if that is helpful before trying to make changes to her medication.  She feels like things are going well enough that she really does not want to change anything.  Encouraged to continue in therapy.  No acute safety concerns.  No SI.  No problematic drug or alcohol use.    1/19/2023:  Patient overall doing well with ongoing improvements.  Patient even recently starting to drive again which is a big deal.  She continues in therapy and finds it helpful.  Discussed possible changes to her dosing to see if headaches might improve, along with fatigue.  Discussed possibly splitting her Effexor-XR dose as 75 mg twice daily versus 37 point 5 in the morning and 112.5 mg at bedtime.  Patient feels like she is in a good place right now and really does not want to make any medication changes if possible.  We will keep things the same for now.  Encouraged to drink lots of water.  No acute safety concerns.  No SI.  No problematic drug or alcohol use.    3/9/2023:  Since last visit we decreased Effexor-XR just slightly.  Unfortunately her anxiety has worsened on decreased dose.  Headaches and fatigue did not improve at all.  Since anxiety worsened we will go back up to 150 mg daily.  Patient prefers this over a trial of further decreased dose.  We will start a trial with propranolol to see if this helps improve anxiety and headaches.  May also help with her tremor.  Discussed watching for feeling too lightheaded or dizzy.  Patient also on low-dose lisinopril.  Last vital signs blood pressure 132/78 and pulse 96 on 2/24/2023.  No acute safety concerns.  No SI.   No problematic drug or alcohol use.    Medication side effects and alternatives were reviewed. Health promotion activities recommended and reviewed today. All questions addressed. Education and counseling completed regarding risks and benefits of medications and psychotherapy options. Recommend therapy for additional support.     Treatment Plan:    Continue Ambien CR 6.25 mg at bedtime as needed for sleep. Do not take with alcohol or opioid pain medication. Make sure to have 8-10 hours to devote to sleep after taking. Careful/caution for falls in middle of night. Discussed sleep medicine referral previously but patient has declined-could consider in future.     Increase Effexor-XR/venlafaxine ER back to 150 mg daily for depression and anxiety.     Start propranolol 10 mg twice daily for anxiety, tremor, headaches.    Continue to follow with neurology as they recommend.      Continue all other cares per primary care provider.     Continue all other medications as reviewed per electronic medical record today.     Safety plan reviewed. To the Emergency Department as needed or call after hours crisis line at [phone] or [phone]. [place] Crisis Text Line. Text [place] to 926839 or Suicide LifeLine Chat: suicidepreventionlifeline.org/chat    Continue individual psychotherapy for additional support and ongoing development of nonpharmacologic coping skills and strategies.    Schedule an appointment with me in 2 weeks or sooner as needed. Call [facility] at [phone] to schedule.    Follow up with primary care provider as planned or for acute medical concerns.    Call the psychiatric nurse line with medication questions or concerns at [phone].    MyChart may be used to communicate with your provider, but this is not intended to be used for emergencies.    Administrative Billing:   Phone Call/Video Duration: 16 Minutes  Start: 11:11a  Stop: 11:27a    Patient Status:  Patient will continue to be  seen for ongoing consultation and stabilization.    Signed:   Judy Pompa DO  [facility]    Disclaimer: This note consists of symbols derived from keyboarding, dictation and/or voice recognition software. As a result, there may be errors in the script that have gone undetected. Please consider this when interpreting information found in this chart.

## 2023-03-09 NOTE — PATIENT INSTRUCTIONS
Treatment Plan:  Continue Ambien CR 6.25 mg at bedtime as needed for sleep. Do not take with alcohol or opioid pain medication. Make sure to have 8-10 hours to devote to sleep after taking. Careful/caution for falls in middle of night. Discussed sleep medicine referral previously but patient has declined-could consider in future.   Increase Effexor-XR/venlafaxine ER back to 150 mg daily for depression and anxiety.   Start propranolol 10 mg twice daily for anxiety, tremor, headaches.  Continue to follow with neurology as they recommend.    Continue all other cares per primary care provider.   Continue all other medications as reviewed per electronic medical record today.   Safety plan reviewed. To the Emergency Department as needed or call after hours crisis line at [phone] or [phone]. [place] Crisis Text Line. Text [place] to 306214 or Suicide LifeLine Chat: suicidepreventionlifeline.org/chat  Continue individual psychotherapy for additional support and ongoing development of nonpharmacologic coping skills and strategies.  Schedule an appointment with me in 2 weeks or sooner as needed. Call [facility] at [phone] to schedule.  Follow up with primary care provider as planned or for acute medical concerns.  Call the psychiatric nurse line with medication questions or concerns at [phone].  MyChart may be used to communicate with your provider, but this is not intended to be used for emergencies.

## 2023-03-13 NOTE — PROGRESS NOTES
[facility]  March 14, 2023      Behavioral Health Clinician Progress Note    Patient Name: Jolynn Haji           Service Type:  Individual      Service Location:   MyChart / Email (patient reached)     Session Start Time: 11:00 am  Session End Time: 11:34 am      Session Length: 16 - 37      Attendees: Patient     Service Modality:  Video Visit:      Provider verified identity through the following two step process.  Patient provided:  Patient is known previously to provider    Telemedicine Visit: The patient's condition can be safely assessed and treated via synchronous audio and visual telemedicine encounter.      Reason for Telemedicine Visit: Patient has requested telehealth visit    Originating Site (Patient Location): Patient's home    Distant Site (Provider Location): [facility]    Consent:  The patient/guardian has verbally consented to: the potential risks and benefits of telemedicine (video visit) versus in person care; bill my insurance or make self-payment for services provided; and responsibility for payment of non-covered services.     Patient would like the video invitation sent by:  My Chart    Mode of Communication:  Video Conference via [employer]    As the provider I attest to compliance with applicable laws and regulations related to telemedicine.    Visit Activities (Refresh list every visit): [facility]    Diagnostic Assessment Date: 9/27/22  Treatment Plan Review Date: 4/13/23  See Flowsheets for today's PHQ-9 and TIFFANY-7 results  Previous PHQ-9:   PHQ-9 SCORE 2/2/2023 2/23/2023 3/8/2023   PHQ-9 Total Score - - -   PHQ-9 Total Score [facility] 1 (Minimal depression) 1 (Minimal depression) 5 (Mild depression)   PHQ-9 Total Score 1 1 5   Some encounter information is confidential and restricted. Go to Review Flowsheets activity to see all data.     Previous TIFFANY-7:   TIFFANY-7 SCORE 1/13/2023 2/2/2023 2/23/2023   Total  Score - - -   Total Score 1 (minimal anxiety) 4 (minimal anxiety) 4 (minimal anxiety)   Total Score 1 4 4       KARINA LEVEL:  KARINA Score (Last Two) 8/27/2010 2/22/2011   KARINA Raw Score 51 49   Activation Score 91.6 82.8   KARINA Level 4 4       DATA  Extended Session (60+ minutes): No  Interactive Complexity: No  Crisis: No  [facility] Patient: No    Treatment Objective(s) Addressed in This Session:  Target Behavior(s): anxiety and depression    Depressed Mood: Increase interest, engagement, and pleasure in doing things  Decrease frequency and intensity of feeling down, depressed, hopeless  Improve quantity and quality of night time sleep / decrease daytime naps  Feel less tired and more energy during the day   Improve diet, appetite, mindful eating, and / or meal planning  Identify negative self-talk and behaviors: challenge core beliefs, myths, and actions  Improve concentration, focus, and mindfulness in daily activities   Feel less fidgety, restless or slow in daily activities / interpersonal interactions  Anxiety: will experience a reduction in anxiety, will develop more effective coping skills to manage anxiety symptoms, will develop healthy cognitive patterns and beliefs and will increase ability to function adaptively    Current Stressors / Issues:  Feels the propanol has been really helping with anxiety. She has been feeling like her self for the past 5 days. Continues to have several headaches. She is considering going back to yoga after looking as some neck stretches.     3/9/23 ([facility])  She has been more anxious on the different dosing of medications. She has also not noticed decrease in headaches. Discussed neck tension and using body scanning. She continues to feel a bit foggy in the afternoons.     3/3/23  Reports doing well. States she has been in communication with MD about headaches. Has noticed on days when she has lower dose of med she has more anxiety. She has been examining her anxiety when going out  "socially. Does not feel it is associated with the socializing itself but rather \"will the anxiety come up.\" Discussed upcoming appt for neuro psych testing. Is hesitant but willing to do testing. May want to talk to MD about it prior appt. Fearful about Drug Induced Parkinson's.       2/17/23  Pt reports she is doing well. Reports anxiety has been better. Able to control it better with accepting and reognition of anxiety. Continues to utilize meditation and review of skills taught in therapy. Ice packs has been helping with headaches. Self talk has much improved and has gained more confidence. Anxiety in the evening have improved but still feeling the anxiety at times. She has been wanting to work exercise into her daily routine. Sleep has been also improve. ID values: umm, family, self respect responsibility and kindness.       2/10/23  Pt reports she had such a good time with her trip to her sisters house. Much reduced anxious than expected (compared to home) and sleep was really good. Discussed implementing things that worked at [employer] for home. Continues to have some anxiety when going into store or before events. Reports panic attacks are less than previous week. Discussed triggers for anxiety. Discussed do expectations of self meet values.  ID values from next time and look at expectations for self. Pt asked \"What could I do better to be good enough\". Struggles with being good enough for self. Talked about list of things she wants to achieve in order to feel \"good enough\". One being able to go to places with out wondering if she will be anxious.     Identified Triggers for anxiety and background thought behind it:  More anxious when tired: less bandwidth to utilize skills  Feeling going to be late  Making quick decisions  Technology   organizing big meal for company: what people expect of her  MD visits or waiting for visit: anticipations of events at MD, has not had poor experiences, husbands medical " experience   What if something happens to Zach   Packing groceries and people are behind her  Sometimes going out to eat (much better): menu with too many options and having to make decisions  Holiday: anticipation and wanting it to be perfect for people/what ng people expect of her  Dealing with insurance problem: hassle and being out of her control   Being in the unknown about things: Slowness of recovery from anxiety, what ifs  Do people see I am anxious  Large crowd of people: leading up to and during (highest), people might be noticing her, dont want to stand   *Things that need to be done but are not done(floor needing to be washed, getting decorations down etc): likes list to check off (what to accomplish that week), Not accomplishing goal. Self placed expectations (high standards from growing up), perfectionism (mom perfectionism as a child), some self comparisons to others, taking on too much during a week, and expecting to be like old self all the time (compare self to old self).     2/1/23  Pt is reporting she is doing well. She has been out driving to do shopping and seeing friends. Continues to have improvement with anxiety each day. Continues to have headaches but is wanting to change medications until she gets back from trip to her sisters home. Having some anxiety about spending over night for the first time since COVID. Discussed steps to addressing anxiety at her sisters home and drive up. 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 Became dizzy and become worried about the physical sx she was experiencing. Anxiety grew from that point on. Discussed how she handled it in the moment with redirecting thoughts, accepting anxiety and attempting some of the anxiety tech discussed. Pt will explore other situations in which anxiety tends to arise for next session. Pt has been keeping a journal of her daily experience and will reexamine journal to better understand  anxiety. Pt listed several situations she knows brings anxiety.      Identified Triggers:  Feeling rushed  Not wanting to be late  Having others do responsibilities she once carried as her responsibility  Being flexible  Expectations of others  What do others see/think of her when experiencing panic attack    1/23/23  Reports she is doing really well. Reports she will be driving up to see her grandson. She is highly looking forward to this and making valentines cards. Some anxiety with going in to the grocery store but not once she gets started shopping. Reports her ultimate goal would be to go to the mall alone with traffic as that has been causing anxiety. Headaches continue to be of concern to her. Discussed further and identified them coming following either a panic attack or after high anxiety. Discussed using sensory tech for elevating the anxiety and also for following headache such as heating pad for neck or eyes. Discussed her conversation with Dr. Pompa about possibly changing meds to help with headaches. She reports relishing in current progress and is not wanting meds to change at this time. Headaches do interfere sometimes with daily life. She will be going to lunch with several college friends. This has caused a bit of anxiety in accepting the invite. Normalized experience for pt.       Progress on Treatment Objective(s) / Homework:  Satisfactory progress - ACTION (Actively working towards change); Intervened by reinforcing change plan / affirming steps taken    Motivational Interviewing    MI Intervention: Expressed Empathy/Understanding, Supported Autonomy, Collaboration, Evocation, Open-ended questions, Change talk (evoked) and Reframe     Change Talk Expressed by the Patient: Desire to change Ability to change Reasons to change Need to change    Provider Response to Change Talk: E - Evoked more info from patient about behavior change, A - Affirmed patient's thoughts, decisions, or attempts at  behavior change, R - Reflected patient's change talk and S - Summarized patient's change talk statements      Care Plan review completed: Yes    Medication Review:  No changes to current psychiatric medication(s)    Medication Compliance:  Yes    Changes in Health Issues:   None reported    Chemical Use Review:   Substance Use: Chemical use reviewed, no active concerns identified      Tobacco Use: No current tobacco use.      Assessment: Current Emotional / Mental Status (status of significant symptoms):  Risk status (Self / Other harm or suicidal ideation)  Patient denies a history of suicidal ideation, suicide attempts, self-injurious behavior, homicidal ideation, homicidal behavior and and other safety concerns  Patient denies current fears or concerns for personal safety.  Patient denies current or recent suicidal ideation or behaviors.  Patient denies current or recent homicidal ideation or behaviors.  Patient denies current or recent self injurious behavior or ideation.  Patient denies other safety concerns.  A safety and risk management plan has not been developed at this time, however patient was encouraged to call [place] [phone] should there be a change in any of these risk factors.    Appearance:   Appropriate   Eye Contact:   Good   Psychomotor Behavior: Normal   Attitude:   Cooperative   Orientation:   All  Speech   Rate / Production: Normal    Volume:  Normal   Mood:    Anxious   Affect:    Appropriate   Thought Content:  Clear   Thought Form:  Coherent  Logical   Insight:    Good     Diagnoses:  1. Generalized anxiety disorder    2. Insomnia, unspecified type    3. Mood disorder (H)        Collateral Reports Completed:  Not Applicable    Plan: (Homework, other):  Patient was given information about behavioral services and encouraged to schedule a follow up appointment with the clinic [facility] in 1 week.  She was also given information about mental health symptoms and treatment options .  RICKIE  Recommendations: No indications of CD issues.  Erika Francisco MSW LICSW      ______________________________________________________________________    Integrated Primary Care Behavioral Health Treatment Plan    Patient's Name: Jolynn Haji  YOB: 1955    Date of Creation: 10/5/22  Date Treatment Plan Last Reviewed/Revised: 1/13/23    DSM5 Diagnoses: 300.02 (F41.1) Generalized Anxiety Disorder  Psychosocial / Contextual Factors: Individual Factors high anxiety associated with depressive sx, interfereing with ability to function as she would like.  .  PROMIS (reviewed every 90 days):     Referral / Collaboration:  Was/were discussed and patient will pursue. - individual therapy    Anticipated number of session for this episode of care: 5-8 sessions  Anticipation frequency of session: Weekly  Anticipated Duration of each session: 38-52 minutes  Treatment plan will be reviewed in 90 days or when goals have been changed.       MeasurableTreatment Goal(s) related to diagnosis / functional impairment(s)  Goal 1: Patient will work with provider to manage symptoms    I will know I've met my goal when when less anxious and feeling down.      Objective #A (Patient Action)    Patient will  attend all sessions.  Status: Continued - Date(s):1/13/23     Intervention(s)  Therapist will teach educate patient on treatment options, clarify concerns, work with pt to overcome any resistance to compliance..    Objective #B  Patient will identify 3 fears / thoughts that contribute to feeling anxious.  Status: Continued - Date(s):1/13/23     Intervention(s)  Therapist will educate patient on treatment options, clarify concerns, work with pt to overcome any resistance to compliance..    Objective #C  Patient will identify 3 initial signs or symptoms of anxiety.  Status: Continued - Date(s):1/13/23     Intervention(s)  Therapist will educate patient on treatment options, clarify concerns, work with pt to overcome any resistance to  compliance..        Patient has reviewed and agreed to the above plan.      Erika Francisco, [facility]  March 14, 2023  Answers for HPI/ROS submitted by the patient on 12/6/2022  TIFFANY 7 TOTAL SCORE: 3  Answers for HPI/ROS submitted by the patient on 3/8/2023  If you checked off any problems, how difficult have these problems made it for you to do your work, take care of things at home, or get along with other people?: Not difficult at all  PHQ9 TOTAL SCORE: 5

## 2023-03-14 ENCOUNTER — VIRTUAL VISIT (OUTPATIENT)
Dept: BEHAVIORAL HEALTH | Facility: CLINIC | Age: 68
End: 2023-03-14
Payer: COMMERCIAL

## 2023-03-14 DIAGNOSIS — F41.1 GENERALIZED ANXIETY DISORDER: Primary | ICD-10-CM

## 2023-03-14 DIAGNOSIS — F39 MOOD DISORDER (H): ICD-10-CM

## 2023-03-14 DIAGNOSIS — G47.00 INSOMNIA, UNSPECIFIED TYPE: ICD-10-CM

## 2023-03-14 PROCEDURE — 90832 PSYTX W PT 30 MINUTES: CPT | Mod: VID | Performed by: SOCIAL WORKER

## 2023-03-17 ENCOUNTER — OFFICE VISIT (OUTPATIENT)
Dept: NEUROPSYCHOLOGY | Facility: CLINIC | Age: 68
End: 2023-03-17
Attending: PSYCHIATRY & NEUROLOGY
Payer: COMMERCIAL

## 2023-03-17 DIAGNOSIS — R41.3 COMPLAINTS OF MEMORY DISTURBANCE: Primary | ICD-10-CM

## 2023-03-17 DIAGNOSIS — F41.9 ANXIETY: ICD-10-CM

## 2023-03-17 DIAGNOSIS — G21.19 DRUG-INDUCED PARKINSONISM (H): ICD-10-CM

## 2023-03-17 PROCEDURE — 96138 PSYCL/NRPSYC TECH 1ST: CPT

## 2023-03-17 PROCEDURE — 96133 NRPSYC TST EVAL PHYS/QHP EA: CPT

## 2023-03-17 PROCEDURE — 90791 PSYCH DIAGNOSTIC EVALUATION: CPT

## 2023-03-17 PROCEDURE — 96139 PSYCL/NRPSYC TST TECH EA: CPT

## 2023-03-17 PROCEDURE — 96132 NRPSYC TST EVAL PHYS/QHP 1ST: CPT

## 2023-03-17 NOTE — NURSING NOTE
The patient was seen for neuropsychological evaluation at the request of Dr. Judy Pompa, for the purposes of diagnostic clarification and treatment planning.  193 minutes of test administration and scoring were provided by this writer, Danny Muller. Please see Dr. Kassie Ashton's report for a full interpretation of the findings.

## 2023-03-17 NOTE — LETTER
3/17/2023       RE: Jolynn Haji  [id number]  [place] [address]     Dear Colleague,    Thank you for referring your patient, Jolynn Haji, to the [facility]UR[facility] [place] at [facility]. Please see a copy of my visit note below.    NEUROPSYCHOLOGICAL EVALUATION  **CONFIDENTIAL**    **This is a medical document intended for communication with the referring provider. It is written in medical language and may contain abbreviations or verbiage that are unfamiliar. It may appear blunt or direct. This report and the results within are not intended to be interpreted in isolation without consultation with your medical provider. **      Name: Jolynn Haji Education: Master s degree (18 years)    (age): 1955 (67 years) ESTES: 3/17/2023   Referral: Judy Pompa DO                  Department of Psychiatry Handedness: Right  MRN ([facility]): [id number]     IDENTIFYING INFORMATION AND REASON FOR REFERRAL   Ms. 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 This evaluation was requested to provide a comprehensive assessment of her current neuropsychological status to inform treatment planning.     IMPRESSION  See below for relevant background information and detailed test results. See separate abstract for supporting documentation including a list of neuropsychological measures and test scores.    In the context of estimated average to high average premorbid intellectual abilities, Ms. Haji s current neuropsychological profile revealed variability in mental flexibility/set-shifting, with her best performance in the exceptionally high range. Variability was noted on tests of memory with her best performance within expectation. Specifically, encoding and retrieval of visual information were mildly impaired; encoding and retrieval of rote, unstructured verbal information (i.e., word list  with repetition) were low average, and encoding and retrieval of contextualized verbal information (i.e., short stories) were average. Recognition performances were within expectation across visual and verbal memory tests. There was no indication of rapid forgetting of information. Relative weaknesses (i.e., low average scores) were also observed on tests of confrontation naming and verbal reasoning, and there was some evidence of subtle difficulty with planning/organization. Her performance was within expectation on tests of basic auditory attention, working memory, verbal fluency, visuospatial processing, psychomotor processing speed, cognitive inhibition, and knowledge of health and safety. She demonstrated strengths on tasks of letter-cue verbal fluency and verbal set-shifting, with performances in the exceptionally high range. Assessment of current psychological and emotional status revealed minimal symptoms of depression and anxiety; however, she was observed to be mildly anxious throughout the assessment.    Overall, Ms. Haji exhibited mildly impaired scores on a test of visual learning and memory, with variability across tests of executive functioning, in the context of other well preserved cognitive abilities. These findings are nonspecific but are not suggestive of acquired neurologic disease or neurodegenerative process. While she did not report experiencing clinically significant levels of anxiety or depression at present, psychological symptoms, including anxiety, depression, stress, lack of refreshing sleep, and headaches may intermittently interfere with cognitive effectiveness, typically through interfering with normal attention and efficiency of information processing. These difficulties can compromise other aspects of cognition such as memory, divided attention, and organization. Continuing to address mental health symptoms may not only aid in continued improvement of her mood and daily functioning  but may also improve her daily cognitive efficiency.    RECOMMENDATIONS  1. Ms. Haji endorsed persistent anxiety symptoms since her episode of syncope in 2017. She is encouraged to continue regular follow-up with her psychiatrist and psychotherapist for management of her mood and anxiety symptoms.  2. Ms. Haji is encouraged to live a healthy lifestyle that promotes brain health including continuing appropriate exercise, eating healthy, and maintaining regular follow-up with her physicians for management of her physical and mental health conditions.   3. Ms. Haji is encouraged to utilize the following behavioral strategies to maximize cognitive functioning in her daily life:   -Eliminate distraction. Eliminating environmental distracters can facilitate attention and memory performance. For example, turning off music or the television while having a conversation, so that all of your attention is focused on the task at hand.  -Work on decreasing interference from intrusive thoughts. When intrusive thoughts begin to interfere with your thinking, do not concentrate on them. Instead, observe them passively and calmly redirect your attention back to task.   -Engage in calming activities that increase focus on the present. Incorporating mindfulness techniques such as meditation, relaxation, yoga, and moderate cardiovascular exercise, into your daily routine will help keep you present-oriented and consequently improve attention and memory.  -Pay mindful attention. You may find that you need to make a conscious effort to pay attention to conversations or when learning new information. When attention is not focused, it is difficult for the brain to learn new information. Thus, making a mindful effort to pay attention as you go through daily tasks will assist and facilitate memory recall later on.  -Allow for time. Take the time needed to learn and recall information. Some people tend to worry if they can't immediately recall  something. Instead, you should take time to express a thought or complete a task rather than be critical or harsh on yourself.  -Use external aids to increase structure in daily life. Ongoing use of compensatory strategies such as notes, lists, and a centralized calendar are supported. Tools such as smart phones with alarms and reminders are helpful in bringing structure to daily activities.   -Practice good sleep hygiene. Poor sleep can interfere with attention and memory. The sleep foundation has several recommendations for improving sleep quality including:  -Set a sleep schedule with a nightly routine and fixed wakeup time. Soft music, light stretching, reading and/or relaxation exercises (e.g., meditation, deep breathing) can be helpful to wind down before bed. Avoid bright lights and electronics at least 30 minutes before bed.  -Keep naps short and limited to the early afternoon.  -Avoid caffeine in the afternoon or evening. Avoid eating dinner late.  -Get daylight exposure (sunlight) during the day to encourage quality sleep at night  -Optimize your bedroom with a comfortable mattress, pillow, and bedding; use heavy curtains or an eye mask to prevent light from interrupting your sleep. Light smells, such as lavender, can induce a calmer state of mind and cultivate a positive space for sleep.    -For more information, a simple set of guidelines can be found here: https://www.sleepfoundation.org/sleep-topics/sleep-hygiene  4. The current evaluation will serve as a cognitive baseline against which results of future evaluations may be compared. No follow-up is currently indicated; however, Ms. Haji may be referred for a repeat neuropsychological evaluation if there is significant change in cognitive status in the future.     Thank you for the opportunity to participate in Ms. Haji s care.  These findings and recommendations were reviewed with the patient in a separate feedback session on 3/22/2023 and all her  questions were answered. If you have any questions regarding this evaluation, please do not hesitate to contact us.          Lakeisha Eldridge, Ph.D.  Neuropsychology Fellow      Kassie Ashton, Ph.D.,   Clinical Neuropsychologist    RELEVANT BACKGROUND INFORMATION AND SUPPORTING DOCUMENTATION  Gathered from a clinical interview with the patient and reviews of electronic medical record.    History of Presenting Problem(s)  Ms. 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 She reported that it takes her longer to plan and that her thinking is slowed, especially in the morning after taking Ambien to aid with sleep.  She reported mild age-related memory lapses and occasional word finding difficulty but denied other cognitive complaints. She is independent in her activities of daily living including managing medications, finances, meal preparation, driving, and basic self-care. She reported that she stopped driving from April 2022 to January 2023 due to feeling less confident in her driving abilities. She reported no instances of becoming lost while driving, close calls, or accidents immediately prior to or since returning to driving.     Physically, she reported feeling  off balance  at times, but this symptom is reportedly improving. She denied falls. She has tingling in her feet at times. She reported a persistent bilateral tremor in her pinky fingers. She believed the tremor has been improving since discontinuing Abilify, but noted that it is made worse by anxiety. She reported that her sense of smell is not as good as what it used to be when she was younger. She described a metallic taste at times. She reported a history of daily headaches, which reportedly have improved with the addition of propranolol for anxiety treatment; however, she reported a headache at the time of this evaluation. She did not report any other physical complaints or sensorimotor disturbances.     Emotionally, Ms. Brunson  reported that she was first diagnosed with anxiety and depression in the context of delayed recovery after a syncopal episode and hit to the head in 2017. However, she reported that since adding propranolol to her medication regimen last week, her anxiety has been well controlled. She is currently engaged in psychotherapy. Overall, she described feeling  happy, healthy, and blessed  and she denied experiencing symptoms consistent with depression. She reported that her  noticed that she was very quiet and she would talk in 1-word sentences when she was first diagnosed with medication-induced parkinsonism. However, she stated that she is more talkative now and she did not report other changes to her behavior or personality.     Neurodiagnostic Findings  ? Head CT w/o contrast (4/14/2017) IMPRESSION: 1.  No evidence of acute trauma. 2.  Mild generalized atrophy of the brain.  ? Brief cognitive screening administered during primary care visit with Dr. Ibarra (2/2/2023): MiniCog: Normal clock, 3/3 item recall.    Medical History (per EMR and patient report)  ? Drug-induced parkinsonism (Abilify-generic; aripiprazole)  ? Essential hypertension  ? Hyperlipidemia  ? Basal cell carcinoma  ? Colon polyps  ? Osteoarthrosis  ? Chronic constipation  ? Raynaud s phenomenon  ? Seasonal allergic rhinitis  ? Dupuytren s disease of palm-right 4th digit flexor tendon  ? Migraine headaches  ? Rosacea  ? Bilateral foot numbness and tingling  ? Lipidosis  ? Stress incontinence  ? Cough  ? Acute bronchitis  ? Synovial cyst of popliteal space  ? Hidradenitis  ? Syncopal episode in 2017 in the context of illness in which she hit her head on carpet  ? COVID-19 infection: December 2022; mild symptoms; treated with Paxlovid  ? No history of traumatic brain injury, stroke, or seizure.    Health Behaviors   ? Sleep: Reported persistent sleep dysfunction (2-3 hours at night) after her episode of syncope in 2017; her sleep has improved  to 7.5 hours of sleep nightly; prescribed zolpidem for sleep; feels rested upon awakening; reported no history of snoring, apneas, or dream enactment behaviors; denied naps; slept 8 hours the night before this examination.  ? Exercise: Exercising weekly with a friend.  ? Pain: Daily headaches, improved with propranolol; reported feeling as though a headache was coming on but otherwise denied pain at the time of this evaluation   ? Appetite/Weight: Good appetite; weight has remained stable.     Psychiatric History  ? Diagnosed with depression and anxiety in 2017; currently described herself as happy, healthy, and blessed; denied current concerns for depression; felt anxiety has significantly improved with starting propranolol last week.   ? Suicidality/ Self-harm: Denied current suicidal ideation and self-harm ideation; denied history of suicide attempts; listed her grandchildren as reasons to live.  ? Denied alteration of sensory perceptual processes or disordered thought.  ? Psychiatric treatment: Currently engaged in individual psychotherapy; currently prescribed propranolol and venlafaxine, which are managed by her psychiatrist     Substance Use History  ? Alcohol: Denied  ? Nicotine: Denied  ? Illicit substances: Denied  ? Problematic Substance Use: Denied    Current Medications   propranolol  lisinopril  venlafaxine  zolpidem    Educational, & Occupational History  ? Childhood behavioral / emotional / academic problems: Denied  ? Native Language: English  ? Education: Master s degree in education  ? Occupation:  for 37 years; retired approximately 9 years ago.    Psychosocial History  ? Marital:  (45 years)  ? Children: 2 adult children; 2 grandchildren  ? Housing: Living with her     Family History (per patient)  ? No known family history of Alzheimer s disease, Parkinson s disease, other dementias, or cerebrovascular disease.    RESULTS  See separate abstract for list of  neuropsychological measures and test scores. Descriptive ranges are based on American Academy of Clinical Neuropsychology (2020) consensus guidelines, or test manuals where appropriate. All standardized scores were adjusted for age. Some measures utilized additional adjustments for sex, race/ethnicity, and/or education where appropriate.    PRE-MORBID ABILITY: Premorbid abilities were estimated within the high average range based on single word reading ability and demographic factors including sex, ethnicity, education, occupation, and geographic region.   GENERAL COGNITIVE STATUS: Orientation was within expectation for time, place, general personal information, and basic cultural information. Within the practical domain, performance was within expectation on a measure of conceptual understanding of issues pertaining to health and safety. Her score was consistent with individuals functioning at a high level of independence in the community.  ATTENTION/EXECUTIVE FUNCTIONS: Immediate auditory attention and working memory performances were average. Verbal abstract reasoning performance was low average. Visuospatial reasoning through pattern recognition was average. Performance on a psychomotor test of set-shifting/cognitive flexibility was below average and contained one error. Performance on a verbal test of set-shifting/cognitive flexibility was exceptionally high with no errors. Response inhibition performance was average. Psychomotor processing speed ranged from average to high average. There were some indications of poor planning and inattention to design features in her copy of a complex figure.  LANGUAGE: Confrontation naming performance was low average and improved with cues. Letter-cue verbal fluency performance was exceptionally high. Semantic verbal fluency performance was average.   VISUOSPATIAL PROCESSING: Performance on a spatial perception task requiring judgement of angled lines was average. Copy of a  complex geometric figure was average for her age. Copy of an array of simple geometric shapes was within expectation.  LEARNING AND MEMORY: Initial encoding of a word list over multiple learning trials was low average. Delayed recall of the list was low average. Recognition of the word list was average with no false-positive errors. Initial encoding of contextualized verbal information in the form of short stories was average. Delayed recall performance was average. Recognition of story details was average. Encoding of visual information was below average with no benefit from repeated exposure. Delayed recall was also below average; however, she retained all of the visual information that she initially encoded. Recognition of visual information was average and without error.   PSYCHOLOGICAL AND BEHAVIORAL: On self-report measures, she reported minimal symptoms of depression and anxiety.   PERFORMANCE VALIDITY: Performance on neuropsychological tests is dependent upon a number of factors, including sufficient engagement and motivation, to reliably establish an examinee s level of cognitive functioning.  Based upon observations made throughout the evaluation, the patient did not appear to deliberately perform in a suboptimal manner and demonstrated adequate frustration tolerance on cognitively challenging tasks. Score on an imbedded index of performance validity was in the valid range. Overall, test results are believed to accurately represent the patient s current neurocognitive status.    BEHAVIORAL OBSERVATIONS  ? Alert, oriented to self, time, place, and circumstance; attentive and focused while undergoing testing  ? Appearance: Well-groomed, casually dressed  ? Gait/Posture: No abnormalities noted.  ? Sensorimotor: No abnormalities noted.  ? Behavior: Cooperative, pleasant, no behavioral abnormalities noted.  ? Speech: Rare paraphasia ( primary caretaker  instead of primary care physician;  propranolol  instead  of Paxlovid); otherwise fluent; normal rate, prosody, volume, and articulation; no conversational word finding difficulty.  ? Thought process: Logical, linear, and goal-directed.  ? Thought content: Logical and appropriate.   ? Affect: Broad, responsive, consistent with reported mood; good eye contact  ? Mood: Mildly anxious  ? Insight and Judgment: Intact  ? Approach to testing: Efficient; good frustration on cognitively demanding tasks.   ? Rapport: Established and maintained      Activities included in this evaluation: CPT Code #Units Time (min)   Psychiatric diagnostic interview 87702 [id number] --   Test evaluation services by professional; first hour. [id number]   Test evaluation services by professional, additional hour (+) [id number]    Test administration and scoring by technician, first 30 mins [id number] [phone]   Test administration and scoring by technician, additional 30 mins (+) [id number]    R41.3; F41.9; G21.19    LIZZETH HENRIQUEZ, PhD

## 2023-03-20 NOTE — PROGRESS NOTES
[facility]  March 23, 2023      Behavioral Health Clinician Progress Note    Patient Name: Jolynn Haji           Service Type:  Individual      Service Location:   MyChart / Email (patient reached)     Session Start Time: 10:30 am  Session End Time: 10:58 am      Session Length: 16 - 37      Attendees: Patient     Service Modality:  Video Visit:      Provider verified identity through the following two step process.  Patient provided:  Patient is known previously to provider    Telemedicine Visit: The patient's condition can be safely assessed and treated via synchronous audio and visual telemedicine encounter.      Reason for Telemedicine Visit: Patient has requested telehealth visit    Originating Site (Patient Location): Patient's home    Distant Site (Provider Location): [facility]    Consent:  The patient/guardian has verbally consented to: the potential risks and benefits of telemedicine (video visit) versus in person care; bill my insurance or make self-payment for services provided; and responsibility for payment of non-covered services.     Patient would like the video invitation sent by:  My Chart    Mode of Communication:  Video Conference via Amwell    As the provider I attest to compliance with applicable laws and regulations related to telemedicine.    Visit Activities (Refresh list every visit): [facility] Only    Diagnostic Assessment Date: 9/27/22  Treatment Plan Review Date: 4/13/23  See Flowsheets for today's PHQ-9 and TIFFANY-7 results  Previous PHQ-9:   PHQ-9 SCORE 2/2/2023 2/23/2023 3/8/2023   PHQ-9 Total Score - - -   PHQ-9 Total Score [facility] 1 (Minimal depression) 1 (Minimal depression) 5 (Mild depression)   PHQ-9 Total Score 1 1 5   Some encounter information is confidential and restricted. Go to Review Flowsheets activity to see all data.     Previous TIFFANY-7:   TIFFANY-7 SCORE 1/13/2023 2/2/2023 2/23/2023   Total  "Score - - -   Total Score 1 (minimal anxiety) 4 (minimal anxiety) 4 (minimal anxiety)   Total Score 1 4 4       KARINA LEVEL:  KARINA Score (Last Two) 8/27/2010 2/22/2011   KARINA Raw Score 51 49   Activation Score 91.6 82.8   KARINA Level 4 4       DATA  Extended Session (60+ minutes): No  Interactive Complexity: No  Crisis: No  [facility] Patient: No    Treatment Objective(s) Addressed in This Session:  Target Behavior(s): anxiety and depression    Depressed Mood: Increase interest, engagement, and pleasure in doing things  Decrease frequency and intensity of feeling down, depressed, hopeless  Improve quantity and quality of night time sleep / decrease daytime naps  Feel less tired and more energy during the day   Improve diet, appetite, mindful eating, and / or meal planning  Identify negative self-talk and behaviors: challenge core beliefs, myths, and actions  Improve concentration, focus, and mindfulness in daily activities   Feel less fidgety, restless or slow in daily activities / interpersonal interactions  Anxiety: will experience a reduction in anxiety, will develop more effective coping skills to manage anxiety symptoms, will develop healthy cognitive patterns and beliefs and will increase ability to function adaptively    Current Stressors / Issues:   update: Reports anxiety has decreased a lot on the propanol. Continues to have headaches but only about half the days. She has noticed a stronger correlation with headaches and anxiety. She completed Neuro Psych testing. Relieved she has no new dx. Planning on walking with dog. She is feeling more like her self, \"5 star days.\"    Stresses: Neuro psych testing and going to wake alone was stressful. Felt she managed very well.   Appetite: unremarkable  Sleep: unremarkable  Therapy: Continues to do weekly therapy. Park she worried a lot about testing and did not , lesson in not worrying about things that have not happened yet. Feeling very accomplished after attending a " "wake alone without her . She stated it went very well and anxiety was lower than normal and then expected. Discussed decision making in the moment. Discussed it can be difficult to make decisions in the moment. Continues with coping skills, ie meditation and breathing tch.   FARIDA: na  Preg: na  Most important: Wants to address doses of medications.     3/14/23  Feels the propanol has been really helping with anxiety. She has been feeling like her self for the past 5 days. Continues to have several headaches. She is considering going back to yoga after looking as some neck stretches.     3/9/23 ([facility])  She has been more anxious on the different dosing of medications. She has also not noticed decrease in headaches. Discussed neck tension and using body scanning. She continues to feel a bit foggy in the afternoons.     3/3/23  Reports doing well. States she has been in communication with MD about headaches. Has noticed on days when she has lower dose of med she has more anxiety. She has been examining her anxiety when going out socially. Does not feel it is associated with the socializing itself but rather \"will the anxiety come up.\" Discussed upcoming appt for neuro psych testing. Is hesitant but willing to do testing. May want to talk to MD about it prior appt. Fearful about Drug Induced Parkinson's.       2/17/23  Pt reports she is doing well. Reports anxiety has been better. Able to control it better with accepting and reognition of anxiety. Continues to utilize meditation and review of skills taught in therapy. Ice packs has been helping with headaches. Self talk has much improved and has gained more confidence. Anxiety in the evening have improved but still feeling the anxiety at times. She has been wanting to work exercise into her daily routine. Sleep has been also improve. ID values: umm, family, self respect responsibility and kindness.       2/10/23  Pt reports she had such a good time with her trip " "to her sisters house. Much reduced anxious than expected (compared to home) and sleep was really good. Discussed implementing things that worked at sisters for home. Continues to have some anxiety when going into store or before events. Reports panic attacks are less than previous week. Discussed triggers for anxiety. Discussed do expectations of self meet values.  ID values from next time and look at expectations for self. Pt asked \"What could I do better to be good enough\". Struggles with being good enough for self. Talked about list of things she wants to achieve in order to feel \"good enough\". One being able to go to places with out wondering if she will be anxious.     Identified Triggers for anxiety and background thought behind it:  More anxious when tired: less bandwidth to utilize skills  Feeling going to be late  Making quick decisions  Technology   organizing big meal for company: what people expect of her  MD visits or waiting for visit: anticipations of events at MD, has not had poor experiences, husbands medical experience   What if something happens to Zach   Packing groceries and people are behind her  Sometimes going out to eat (much better): menu with too many options and having to make decisions  Holiday: anticipation and wanting it to be perfect for people/what ng people expect of her  Dealing with insurance problem: hassle and being out of her control   Being in the unknown about things: Slowness of recovery from anxiety, what ifs  Do people see I am anxious  Large crowd of people: leading up to and during (highest), people might be noticing her, dont want to stand   *Things that need to be done but are not done(floor needing to be washed, getting decorations down etc): likes list to check off (what to accomplish that week), Not accomplishing goal. Self placed expectations (high standards from growing up), perfectionism (mom perfectionism as a child), some self comparisons to others, taking on " too much during a week, and expecting to be like old self all the time (compare self to old self).     2/1/23  Pt is reporting she is doing well. She has been out driving to do shopping and seeing friends. Continues to have improvement with anxiety each day. Continues to have headaches but is wanting to change medications until she gets back from trip to her sisters home. Having some anxiety about spending over night for the first time since COVID. Discussed steps to addressing anxiety at her sisters home and drive up. 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 Became dizzy and become worried about the physical sx she was experiencing. Anxiety grew from that point on. Discussed how she handled it in the moment with redirecting thoughts, accepting anxiety and attempting some of the anxiety tech discussed. Pt will explore other situations in which anxiety tends to arise for next session. Pt has been keeping a journal of her daily experience and will reexamine journal to better understand anxiety. Pt listed several situations she knows brings anxiety.      Identified Triggers:  Feeling rushed  Not wanting to be late  Having others do responsibilities she once carried as her responsibility  Being flexible  Expectations of others  What do others see/think of her when experiencing panic attack    1/23/23  Reports she is doing really well. Reports she will be driving up to see her grandson. She is highly looking forward to this and making [employer] cards. Some anxiety with going in to the grocery store but not once she gets started shopping. Reports her ultimate goal would be to go to the mall alone with traffic as that has been causing anxiety. Headaches continue to be of concern to her. Discussed further and identified them coming following either a panic attack or after high anxiety. Discussed using sensory tech for elevating the anxiety and also for following headache such as heating  pad for neck or eyes. Discussed her conversation with Dr. Pompa about possibly changing meds to help with headaches. She reports relishing in current progress and is not wanting meds to change at this time. Headaches do interfere sometimes with daily life. She will be going to lunch with several college friends. This has caused a bit of anxiety in accepting the invite. Normalized experience for pt.       Progress on Treatment Objective(s) / Homework:  Satisfactory progress - ACTION (Actively working towards change); Intervened by reinforcing change plan / affirming steps taken    Motivational Interviewing    MI Intervention: Expressed Empathy/Understanding, Supported Autonomy, Collaboration, Evocation, Open-ended questions, Change talk (evoked) and Reframe     Change Talk Expressed by the Patient: Desire to change Ability to change Reasons to change Need to change    Provider Response to Change Talk: E - Evoked more info from patient about behavior change, A - Affirmed patient's thoughts, decisions, or attempts at behavior change, R - Reflected patient's change talk and S - Summarized patient's change talk statements      Care Plan review completed: Yes    Medication Review:  No changes to current psychiatric medication(s)    Medication Compliance:  Yes    Changes in Health Issues:   None reported    Chemical Use Review:   Substance Use: Chemical use reviewed, no active concerns identified      Tobacco Use: No current tobacco use.      Assessment: Current Emotional / Mental Status (status of significant symptoms):  Risk status (Self / Other harm or suicidal ideation)  Patient denies a history of suicidal ideation, suicide attempts, self-injurious behavior, homicidal ideation, homicidal behavior and and other safety concerns  Patient denies current fears or concerns for personal safety.  Patient denies current or recent suicidal ideation or behaviors.  Patient denies current or recent homicidal ideation or  behaviors.  Patient denies current or recent self injurious behavior or ideation.  Patient denies other safety concerns.  A safety and risk management plan has not been developed at this time, however patient was encouraged to call [place] [phone] should there be a change in any of these risk factors.    Appearance:   Appropriate   Eye Contact:   Good   Psychomotor Behavior: Normal   Attitude:   Cooperative   Orientation:   All  Speech   Rate / Production: Normal    Volume:  Normal   Mood:    Anxious   Affect:    Appropriate   Thought Content:  Clear   Thought Form:  Coherent  Logical   Insight:    Good     Diagnoses:  1. Generalized anxiety disorder    2. Insomnia, unspecified type    3. Mood disorder (H)        Collateral Reports Completed:  Not Applicable    Plan: (Homework, other):  Patient was given information about behavioral services and encouraged to schedule a follow up appointment with the clinic [facility] in 1 week.  She was also given information about mental health symptoms and treatment options .  CD Recommendations: No indications of CD issues.  Erika Nolan [facility] LICSW      ______________________________________________________________________    Integrated Primary Care Behavioral Health Treatment Plan    Patient's Name: Jolynn Haji  YOB: 1955    Date of Creation: 10/5/22  Date Treatment Plan Last Reviewed/Revised: 1/13/23    DSM5 Diagnoses: 300.02 (F41.1) Generalized Anxiety Disorder  Psychosocial / Contextual Factors: Individual Factors high anxiety associated with depressive sx, interfereing with ability to function as she would like.  .  PROMIS (reviewed every 90 days):     Referral / Collaboration:  Was/were discussed and patient will pursue. - individual therapy    Anticipated number of session for this episode of care: 5-8 sessions  Anticipation frequency of session: Weekly  Anticipated Duration of each session: 38-52 minutes  Treatment plan will be reviewed in 90 days or when goals  have been changed.       MeasurableTreatment Goal(s) related to diagnosis / functional impairment(s)  Goal 1: Patient will work with provider to manage symptoms    I will know I've met my goal when when less anxious and feeling down.      Objective #A (Patient Action)    Patient will  attend all sessions.  Status: Continued - Date(s):1/13/23     Intervention(s)  Therapist will teach educate patient on treatment options, clarify concerns, work with pt to overcome any resistance to compliance..    Objective #B  Patient will identify 3 fears / thoughts that contribute to feeling anxious.  Status: Continued - Date(s):1/13/23     Intervention(s)  Therapist will educate patient on treatment options, clarify concerns, work with pt to overcome any resistance to compliance..    Objective #C  Patient will identify 3 initial signs or symptoms of anxiety.  Status: Continued - Date(s):1/13/23     Intervention(s)  Therapist will educate patient on treatment options, clarify concerns, work with pt to overcome any resistance to compliance..        Patient has reviewed and agreed to the above plan.      DINESH Whipple  March 23, 2023  Answers for HPI/ROS submitted by the patient on 12/6/2022  TIFFANY 7 TOTAL SCORE: 3  Answers for HPI/ROS submitted by the patient on 3/8/2023  If you checked off any problems, how difficult have these problems made it for you to do your work, take care of things at home, or get along with other people?: Not difficult at all  PHQ9 TOTAL SCORE: 5

## 2023-03-21 ASSESSMENT — ANXIETY QUESTIONNAIRES
8. IF YOU CHECKED OFF ANY PROBLEMS, HOW DIFFICULT HAVE THESE MADE IT FOR YOU TO DO YOUR WORK, TAKE CARE OF THINGS AT HOME, OR GET ALONG WITH OTHER PEOPLE?: NOT DIFFICULT AT ALL
GAD7 TOTAL SCORE: 1
5. BEING SO RESTLESS THAT IT IS HARD TO SIT STILL: NOT AT ALL
GAD7 TOTAL SCORE: 1
2. NOT BEING ABLE TO STOP OR CONTROL WORRYING: NOT AT ALL
3. WORRYING TOO MUCH ABOUT DIFFERENT THINGS: NOT AT ALL
IF YOU CHECKED OFF ANY PROBLEMS ON THIS QUESTIONNAIRE, HOW DIFFICULT HAVE THESE PROBLEMS MADE IT FOR YOU TO DO YOUR WORK, TAKE CARE OF THINGS AT HOME, OR GET ALONG WITH OTHER PEOPLE: NOT DIFFICULT AT ALL
4. TROUBLE RELAXING: NOT AT ALL
6. BECOMING EASILY ANNOYED OR IRRITABLE: NOT AT ALL
7. FEELING AFRAID AS IF SOMETHING AWFUL MIGHT HAPPEN: NOT AT ALL
1. FEELING NERVOUS, ANXIOUS, OR ON EDGE: SEVERAL DAYS
7. FEELING AFRAID AS IF SOMETHING AWFUL MIGHT HAPPEN: NOT AT ALL

## 2023-03-21 ASSESSMENT — PATIENT HEALTH QUESTIONNAIRE - PHQ9: SUM OF ALL RESPONSES TO PHQ QUESTIONS 1-9: 1

## 2023-03-22 NOTE — PROGRESS NOTES
"Provider: SHAWNA      Tech: [email or web address]      Patient Name: Jolynn Haji \"Azalea\"     : 53      MRN: [id number]      ESTES: 3/17/23      Age: 67      Education: 18      Ethnicity: C      Handedness: Right      Station: OP             NEUROPSYCHOLOGICAL TESTS RAW SCORE STANDARDIZED SCORE* DESCRIPTIVE RANGE**   PREMORBID ABILITY       WAIS-IV ACS Test of Premorbid Functioning (Estimated FSIQ) 56 SS= 113 High Average     Estimated FSIQ = 111 High Average          ATTENTION AND EXECUTIVE FUNCTIONS RAW SCORE STANDARDIZED SCORE* DESCRIPTIVE RANGE**   Wechsler Adult Intelligence Scale - 4th Edition (WAIS-IV)      Digit Span Forward 10 ss= 10 Average   Digit Span Backward 8 ss= 10 Average   Digit Span Sequencing 8 ss= 10 Average   Digit Span Total 26 ss= 10 Average   Coding 57 ss= 11 Average   Similarities 15 ss= 6 Low Average   Matrix Reasoning 13 ss= 10 Average   Trail Making Test (Time/errors)        Part A s,r,e 24/0 TS= 58 High Average   Part B s,r,e 152/1 TS= 31 Below Average   B/A Ratio 6.3   --   Stroop       Word e 96 TS= 41 Low Average   Color e 72 TS= 45 Average   Color/Word e 39 TS= 47 Average   Interference e -1 TS= 49 Average   Independent Living Scales (ILS)       Health and Safety 38 TS= 58 High   Sadie-Ontiveros Executive Function System (DKEFS) Verbal Fluency Test    Category Switching: Total Correct 20 ss= 19 Exceptionally High   Category Switching: Total Switching 19 ss= 18 Exceptionally High          LANGUAGE RAW SCORE STANDARDIZED SCORE* DESCRIPTIVE RANGE**   [place] Test s,r,e 54 TS= 41 Low Average   DKEFS Verbal Fluency Test     Letter Fluency Total 21-21-19 (61) ss= 18 Exceptionally High   Category Fluency Total 15-23 (38) ss= 11 Average          VISUOSPATIAL PROCESSING RAW SCORE STANDARDIZED SCORE* DESCRIPTIVE RANGE**   Raheel Complex Figure Test (RCFT) Copy 33 %ile= >16 WNL   Repeatable Battery for the Assessment of Neuropsychological Status (RBANS)   Line Orientation 17 %ile= 51-75 Average   Brief Visual " Memory Test - Revised (BVMT-R) Copy 12   WNL          LEARNING & MEMORY RAW SCORE STANDARDIZED SCORE* DESCRIPTIVE RANGE**   Wechsler Memory Scale-4th Edition (WMS-IV)      Logical Memory I 28 ss= 8 Average   Logical Memory II 18 ss= 10 Average   Logical Memory Recognition 18 %= 26-50 Average   Hammer Verbal Learning Test - Revised (HVLT-R; Form 1)     Total Trials 1-3 6-7-10 (23) TS= 42 Low Average   Delayed Recall 7 TS= 37 Low Average   Retention (%) 70% TS= 35 Below Average   Recognition Hits 11 --     Recognition False Alarms 0 --     Recognition Discriminability 11 TS= 52 Average   Brief Visual Memory Test - Revised (BVMT-R; Form 1)     Trials 1-3 4-4-4 (12) TS= 33 Below Average   Learning 0 TS= 30 Below Average   Delayed Recall 5 TS= 36 Below Average   Recognition Hits 6 %ile= >16 WNL   Recognition False Alarms 0 %ile= >16 WNL   Recognition Discriminability 6 %ile= >16 WNL          PSYCHOLOGICAL & BEHAVIORAL SYMPTOMS RAW SCORE STANDARDIZED SCORE* DESCRIPTIVE RANGE**   Watson Anxiety Inventory (RANJIT) 7 --  Minimal   Watson Depression Inventory - 2nd Edition (BDI-II) 3 --  Minimal          PERFORMANCE VALIDITY RAW SCORE   DESCRIPTIVE RANGE**   RDS 9   Valid Range          * All standardized scores are adjusted for age. Superscripts denote additional adjustment for (s)ex, (r)ace/ethnicity, (l)anguage, and/or (e)ducation.   ** Descriptive ranges are based on American Academy of Clinical Neuropsychology (2020) consensus guidelines, or test manuals where appropriate.    WNL = within normal limits. DC = discontinued due to patient s inability to complete the test.     Standardized scores: TS = T-score; mean = 50, standard deviation =10; z = z-score; mean = 0, standard deviation = 1; ss = scaled score; mean = 10, standard deviation = 3; MAS = [place] Older Adult Normative Study age adjusted scaled score; mean = 10, standard deviation = 3; SS = standard score; mean = 100, standard deviation = 15.

## 2023-03-22 NOTE — PROGRESS NOTES
NEUROPSYCHOLOGICAL EVALUATION  **CONFIDENTIAL**    **This is a medical document intended for communication with the referring provider. It is written in medical language and may contain abbreviations or verbiage that are unfamiliar. It may appear blunt or direct. This report and the results within are not intended to be interpreted in isolation without consultation with your medical provider. **      Name: Jolynn Haji Education: Master s degree (18 years)    (age): 1955 (67 years) ESTES: 3/17/2023   Referral: Judy Pompa DO                  Department of Psychiatry Handedness: Right  MRN ([facility]): [id number]     IDENTIFYING INFORMATION AND REASON FOR REFERRAL   Ms. 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 This evaluation was requested to provide a comprehensive assessment of her current neuropsychological status to inform treatment planning.     IMPRESSION  See below for relevant background information and detailed test results. See separate abstract for supporting documentation including a list of neuropsychological measures and test scores.    In the context of estimated average to high average premorbid intellectual abilities, Ms. Haji s current neuropsychological profile revealed variability in mental flexibility/set-shifting, with her best performance in the exceptionally high range. Variability was noted on tests of memory with her best performance within expectation. Specifically, encoding and retrieval of visual information were mildly impaired; encoding and retrieval of rote, unstructured verbal information (i.e., word list with repetition) were low average, and encoding and retrieval of contextualized verbal information (i.e., short stories) were average. Recognition performances were within expectation across visual and verbal memory tests. There was no indication of rapid forgetting of information. Relative weaknesses (i.e., low  average scores) were also observed on tests of confrontation naming and verbal reasoning, and there was some evidence of subtle difficulty with planning/organization. Her performance was within expectation on tests of basic auditory attention, working memory, verbal fluency, visuospatial processing, psychomotor processing speed, cognitive inhibition, and knowledge of health and safety. She demonstrated strengths on tasks of letter-cue verbal fluency and verbal set-shifting, with performances in the exceptionally high range. Assessment of current psychological and emotional status revealed minimal symptoms of depression and anxiety; however, she was observed to be mildly anxious throughout the assessment.    Overall, Ms. Haji exhibited mildly impaired scores on a test of visual learning and memory, with variability across tests of executive functioning, in the context of other well preserved cognitive abilities. These findings are nonspecific but are not suggestive of acquired neurologic disease or neurodegenerative process. While she did not report experiencing clinically significant levels of anxiety or depression at present, psychological symptoms, including anxiety, depression, stress, lack of refreshing sleep, and headaches may intermittently interfere with cognitive effectiveness, typically through interfering with normal attention and efficiency of information processing. These difficulties can compromise other aspects of cognition such as memory, divided attention, and organization. Continuing to address mental health symptoms may not only aid in continued improvement of her mood and daily functioning but may also improve her daily cognitive efficiency.    RECOMMENDATIONS  1. Ms. Haji endorsed persistent anxiety symptoms since her episode of syncope in 2017. She is encouraged to continue regular follow-up with her psychiatrist and psychotherapist for management of her mood and anxiety symptoms.  2. Ms. Haji  is encouraged to live a healthy lifestyle that promotes brain health including continuing appropriate exercise, eating healthy, and maintaining regular follow-up with her physicians for management of her physical and mental health conditions.   3. Ms. Haji is encouraged to utilize the following behavioral strategies to maximize cognitive functioning in her daily life:   -Eliminate distraction. Eliminating environmental distracters can facilitate attention and memory performance. For example, turning off music or the television while having a conversation, so that all of your attention is focused on the task at hand.  -Work on decreasing interference from intrusive thoughts. When intrusive thoughts begin to interfere with your thinking, do not concentrate on them. Instead, observe them passively and calmly redirect your attention back to task.   -Engage in calming activities that increase focus on the present. Incorporating mindfulness techniques such as meditation, relaxation, yoga, and moderate cardiovascular exercise, into your daily routine will help keep you present-oriented and consequently improve attention and memory.  -Pay mindful attention. You may find that you need to make a conscious effort to pay attention to conversations or when learning new information. When attention is not focused, it is difficult for the brain to learn new information. Thus, making a mindful effort to pay attention as you go through daily tasks will assist and facilitate memory recall later on.  -Allow for time. Take the time needed to learn and recall information. Some people tend to worry if they can't immediately recall something. Instead, you should take time to express a thought or complete a task rather than be critical or harsh on yourself.  -Use external aids to increase structure in daily life. Ongoing use of compensatory strategies such as notes, lists, and a centralized calendar are supported. Tools such as smart phones  with alarms and reminders are helpful in bringing structure to daily activities.   -Practice good sleep hygiene. Poor sleep can interfere with attention and memory. The sleep foundation has several recommendations for improving sleep quality including:  -Set a sleep schedule with a nightly routine and fixed wakeup time. Soft music, light stretching, reading and/or relaxation exercises (e.g., meditation, deep breathing) can be helpful to wind down before bed. Avoid bright lights and electronics at least 30 minutes before bed.  -Keep naps short and limited to the early afternoon.  -Avoid caffeine in the afternoon or evening. Avoid eating dinner late.  -Get daylight exposure (sunlight) during the day to encourage quality sleep at night  -Optimize your bedroom with a comfortable mattress, pillow, and bedding; use heavy curtains or an eye mask to prevent light from interrupting your sleep. Light smells, such as lavender, can induce a calmer state of mind and cultivate a positive space for sleep.    -For more information, a simple set of guidelines can be found here: https://www.sleepfoundation.org/sleep-topics/sleep-hygiene  4. The current evaluation will serve as a cognitive baseline against which results of future evaluations may be compared. No follow-up is currently indicated; however, Ms. Haji may be referred for a repeat neuropsychological evaluation if there is significant change in cognitive status in the future.     Thank you for the opportunity to participate in Ms. Haji s care.  These findings and recommendations were reviewed with the patient in a separate feedback session on 3/22/2023 and all her questions were answered. If you have any questions regarding this evaluation, please do not hesitate to contact us.          Lakeisha Eldridge, Ph.D.  Neuropsychology Fellow      Kassie Ashton, Ph.D.,   Clinical Neuropsychologist    RELEVANT BACKGROUND INFORMATION AND SUPPORTING DOCUMENTATION  Gathered from a  clinical interview with the patient and reviews of electronic medical record.    History of Presenting Problem(s)  Ms. 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 She reported that it takes her longer to plan and that her thinking is slowed, especially in the morning after taking Ambien to aid with sleep.  She reported mild age-related memory lapses and occasional word finding difficulty but denied other cognitive complaints. She is independent in her activities of daily living including managing medications, finances, meal preparation, driving, and basic self-care. She reported that she stopped driving from April 2022 to January 2023 due to feeling less confident in her driving abilities. She reported no instances of becoming lost while driving, close calls, or accidents immediately prior to or since returning to driving.     Physically, she reported feeling  off balance  at times, but this symptom is reportedly improving. She denied falls. She has tingling in her feet at times. She reported a persistent bilateral tremor in her pinky fingers. She believed the tremor has been improving since discontinuing Abilify, but noted that it is made worse by anxiety. She reported that her sense of smell is not as good as what it used to be when she was younger. She described a metallic taste at times. She reported a history of daily headaches, which reportedly have improved with the addition of propranolol for anxiety treatment; however, she reported a headache at the time of this evaluation. She did not report any other physical complaints or sensorimotor disturbances.     Emotionally, Ms. Brunson reported that she was first diagnosed with anxiety and depression in the context of delayed recovery after a syncopal episode and hit to the head in 2017. However, she reported that since adding propranolol to her medication regimen last week, her anxiety has been well controlled. She is currently engaged in  psychotherapy. Overall, she described feeling  happy, healthy, and blessed  and she denied experiencing symptoms consistent with depression. She reported that her  noticed that she was very quiet and she would talk in 1-word sentences when she was first diagnosed with medication-induced parkinsonism. However, she stated that she is more talkative now and she did not report other changes to her behavior or personality.     Neurodiagnostic Findings  ? Head CT w/o contrast (4/14/2017) IMPRESSION: 1.  No evidence of acute trauma. 2.  Mild generalized atrophy of the brain.  ? Brief cognitive screening administered during primary care visit with Dr. Ibarra (2/2/2023): MiniCog: Normal clock, 3/3 item recall.    Medical History (per EMR and patient report)  ? Drug-induced parkinsonism (Abilify-generic; aripiprazole)  ? Essential hypertension  ? Hyperlipidemia  ? Basal cell carcinoma  ? Colon polyps  ? Osteoarthrosis  ? Chronic constipation  ? Raynaud s phenomenon  ? Seasonal allergic rhinitis  ? Dupuytren s disease of palm-right 4th digit flexor tendon  ? Migraine headaches  ? Rosacea  ? Bilateral foot numbness and tingling  ? Lipidosis  ? Stress incontinence  ? Cough  ? Acute bronchitis  ? Synovial cyst of popliteal space  ? Hidradenitis  ? Syncopal episode in 2017 in the context of illness in which she hit her head on carpet  ? COVID-19 infection: December 2022; mild symptoms; treated with Paxlovid  ? No history of traumatic brain injury, stroke, or seizure.    Health Behaviors   ? Sleep: Reported persistent sleep dysfunction (2-3 hours at night) after her episode of syncope in 2017; her sleep has improved to 7.5 hours of sleep nightly; prescribed zolpidem for sleep; feels rested upon awakening; reported no history of snoring, apneas, or dream enactment behaviors; denied naps; slept 8 hours the night before this examination.  ? Exercise: Exercising weekly with a friend.  ? Pain: Daily headaches, improved with  propranolol; reported feeling as though a headache was coming on but otherwise denied pain at the time of this evaluation   ? Appetite/Weight: Good appetite; weight has remained stable.     Psychiatric History  ? Diagnosed with depression and anxiety in 2017; currently described herself as happy, healthy, and blessed; denied current concerns for depression; felt anxiety has significantly improved with starting propranolol last week.   ? Suicidality/ Self-harm: Denied current suicidal ideation and self-harm ideation; denied history of suicide attempts; listed her grandchildren as reasons to live.  ? Denied alteration of sensory perceptual processes or disordered thought.  ? Psychiatric treatment: Currently engaged in individual psychotherapy; currently prescribed propranolol and venlafaxine, which are managed by her psychiatrist     Substance Use History  ? Alcohol: Denied  ? Nicotine: Denied  ? Illicit substances: Denied  ? Problematic Substance Use: Denied    Current Medications   propranolol  lisinopril  venlafaxine  zolpidem    Educational, & Occupational History  ? Childhood behavioral / emotional / academic problems: Denied  ? Native Language: English  ? Education: Master s degree in education  ? Occupation:  for 37 years; retired approximately 9 years ago.    Psychosocial History  ? Marital:  (45 years)  ? Children: 2 adult children; 2 grandchildren  ? Housing: Living with her     Family History (per patient)  ? No known family history of Alzheimer s disease, Parkinson s disease, other dementias, or cerebrovascular disease.    RESULTS  See separate abstract for list of neuropsychological measures and test scores. Descriptive ranges are based on American Academy of Clinical Neuropsychology (2020) consensus guidelines, or test manuals where appropriate. All standardized scores were adjusted for age. Some measures utilized additional adjustments for sex, race/ethnicity, and/or  education where appropriate.    PRE-MORBID ABILITY: Premorbid abilities were estimated within the high average range based on single word reading ability and demographic factors including sex, ethnicity, education, occupation, and geographic region.   GENERAL COGNITIVE STATUS: Orientation was within expectation for time, place, general personal information, and basic cultural information. Within the practical domain, performance was within expectation on a measure of conceptual understanding of issues pertaining to health and safety. Her score was consistent with individuals functioning at a high level of independence in the community.  ATTENTION/EXECUTIVE FUNCTIONS: Immediate auditory attention and working memory performances were average. Verbal abstract reasoning performance was low average. Visuospatial reasoning through pattern recognition was average. Performance on a psychomotor test of set-shifting/cognitive flexibility was below average and contained one error. Performance on a verbal test of set-shifting/cognitive flexibility was exceptionally high with no errors. Response inhibition performance was average. Psychomotor processing speed ranged from average to high average. There were some indications of poor planning and inattention to design features in her copy of a complex figure.  LANGUAGE: Confrontation naming performance was low average and improved with cues. Letter-cue verbal fluency performance was exceptionally high. Semantic verbal fluency performance was average.   VISUOSPATIAL PROCESSING: Performance on a spatial perception task requiring judgement of angled lines was average. Copy of a complex geometric figure was average for her age. Copy of an array of simple geometric shapes was within expectation.  LEARNING AND MEMORY: Initial encoding of a word list over multiple learning trials was low average. Delayed recall of the list was low average. Recognition of the word list was average with no  false-positive errors. Initial encoding of contextualized verbal information in the form of short stories was average. Delayed recall performance was average. Recognition of story details was average. Encoding of visual information was below average with no benefit from repeated exposure. Delayed recall was also below average; however, she retained all of the visual information that she initially encoded. Recognition of visual information was average and without error.   PSYCHOLOGICAL AND BEHAVIORAL: On self-report measures, she reported minimal symptoms of depression and anxiety.   PERFORMANCE VALIDITY: Performance on neuropsychological tests is dependent upon a number of factors, including sufficient engagement and motivation, to reliably establish an examinee s level of cognitive functioning.  Based upon observations made throughout the evaluation, the patient did not appear to deliberately perform in a suboptimal manner and demonstrated adequate frustration tolerance on cognitively challenging tasks. Score on an imbedded index of performance validity was in the valid range. Overall, test results are believed to accurately represent the patient s current neurocognitive status.    BEHAVIORAL OBSERVATIONS  ? Alert, oriented to self, time, place, and circumstance; attentive and focused while undergoing testing  ? Appearance: Well-groomed, casually dressed  ? Gait/Posture: No abnormalities noted.  ? Sensorimotor: No abnormalities noted.  ? Behavior: Cooperative, pleasant, no behavioral abnormalities noted.  ? Speech: Rare paraphasia ( primary caretaker  instead of primary care physician;  propranolol  instead of Paxlovid); otherwise fluent; normal rate, prosody, volume, and articulation; no conversational word finding difficulty.  ? Thought process: Logical, linear, and goal-directed.  ? Thought content: Logical and appropriate.   ? Affect: Broad, responsive, consistent with reported mood; good eye contact  ? Mood:  Mildly anxious  ? Insight and Judgment: Intact  ? Approach to testing: Efficient; good frustration on cognitively demanding tasks.   ? Rapport: Established and maintained      Activities included in this evaluation: CPT Code #Units Time (min)   Psychiatric diagnostic interview 04151 1 --   Test evaluation services by professional; first hour. [id number]   Test evaluation services by professional, additional hour (+) [id number]    Test administration and scoring by technician, first 30 mins [id number] [phone]   Test administration and scoring by technician, additional 30 mins (+) [id number]    R41.3; F41.9; G21.19

## 2023-03-23 ENCOUNTER — VIRTUAL VISIT (OUTPATIENT)
Dept: BEHAVIORAL HEALTH | Facility: CLINIC | Age: 68
End: 2023-03-23
Payer: COMMERCIAL

## 2023-03-23 ENCOUNTER — VIRTUAL VISIT (OUTPATIENT)
Dept: PSYCHIATRY | Facility: CLINIC | Age: 68
End: 2023-03-23
Payer: COMMERCIAL

## 2023-03-23 DIAGNOSIS — G47.00 INSOMNIA, UNSPECIFIED TYPE: ICD-10-CM

## 2023-03-23 DIAGNOSIS — F39 MOOD DISORDER (H): ICD-10-CM

## 2023-03-23 DIAGNOSIS — R25.1 TREMOR: ICD-10-CM

## 2023-03-23 DIAGNOSIS — F41.1 GENERALIZED ANXIETY DISORDER: Primary | ICD-10-CM

## 2023-03-23 DIAGNOSIS — F41.1 GAD (GENERALIZED ANXIETY DISORDER): Primary | ICD-10-CM

## 2023-03-23 DIAGNOSIS — Z87.820 HISTORY OF TRAUMATIC BRAIN INJURY: ICD-10-CM

## 2023-03-23 PROCEDURE — 99214 OFFICE O/P EST MOD 30 MIN: CPT | Mod: VID | Performed by: PSYCHIATRY & NEUROLOGY

## 2023-03-23 PROCEDURE — 90832 PSYTX W PT 30 MINUTES: CPT | Mod: VID | Performed by: SOCIAL WORKER

## 2023-03-23 RX ORDER — ZOLPIDEM TARTRATE 6.25 MG/1
6.25 TABLET, FILM COATED, EXTENDED RELEASE ORAL
Qty: 90 TABLET | Refills: 1 | Status: SHIPPED | OUTPATIENT
Start: 2023-03-23 | End: 2023-04-27 | Stop reason: ALTCHOICE

## 2023-03-23 RX ORDER — PROPRANOLOL HYDROCHLORIDE 20 MG/1
20 TABLET ORAL 2 TIMES DAILY
Qty: 180 TABLET | Refills: 1 | Status: SHIPPED | OUTPATIENT
Start: 2023-03-23 | End: 2023-04-27

## 2023-03-23 ASSESSMENT — ANXIETY QUESTIONNAIRES
1. FEELING NERVOUS, ANXIOUS, OR ON EDGE: NEARLY EVERY DAY
6. BECOMING EASILY ANNOYED OR IRRITABLE: NOT AT ALL
IF YOU CHECKED OFF ANY PROBLEMS ON THIS QUESTIONNAIRE, HOW DIFFICULT HAVE THESE PROBLEMS MADE IT FOR YOU TO DO YOUR WORK, TAKE CARE OF THINGS AT HOME, OR GET ALONG WITH OTHER PEOPLE: SOMEWHAT DIFFICULT
GAD7 TOTAL SCORE: 5
8. IF YOU CHECKED OFF ANY PROBLEMS, HOW DIFFICULT HAVE THESE MADE IT FOR YOU TO DO YOUR WORK, TAKE CARE OF THINGS AT HOME, OR GET ALONG WITH OTHER PEOPLE?: SOMEWHAT DIFFICULT
7. FEELING AFRAID AS IF SOMETHING AWFUL MIGHT HAPPEN: NOT AT ALL
5. BEING SO RESTLESS THAT IT IS HARD TO SIT STILL: NOT AT ALL
7. FEELING AFRAID AS IF SOMETHING AWFUL MIGHT HAPPEN: NOT AT ALL
4. TROUBLE RELAXING: NOT AT ALL
3. WORRYING TOO MUCH ABOUT DIFFERENT THINGS: SEVERAL DAYS
2. NOT BEING ABLE TO STOP OR CONTROL WORRYING: SEVERAL DAYS

## 2023-03-23 ASSESSMENT — PATIENT HEALTH QUESTIONNAIRE - PHQ9
10. IF YOU CHECKED OFF ANY PROBLEMS, HOW DIFFICULT HAVE THESE PROBLEMS MADE IT FOR YOU TO DO YOUR WORK, TAKE CARE OF THINGS AT HOME, OR GET ALONG WITH OTHER PEOPLE: NOT DIFFICULT AT ALL
SUM OF ALL RESPONSES TO PHQ QUESTIONS 1-9: 1
SUM OF ALL RESPONSES TO PHQ QUESTIONS 1-9: 1

## 2023-03-23 NOTE — PROGRESS NOTES
"Telemedicine Visit: The patient's condition can be safely assessed and treated via synchronous audio and visual telemedicine encounter.      Reason for Telemedicine Visit: Patient has requested telehealth visit    Originating Site (Patient Location): Patient's home    Distant Location (provider location):  Off-site    Consent:  The patient/guardian has verbally consented to: the potential risks and benefits of telemedicine (video visit) versus in person care; bill my insurance or make self-payment for services provided; and responsibility for payment of non-covered services.     Mode of Communication:  Video Conference via [employer]    As the provider I attest to compliance with applicable laws and regulations related to telemedicine.        Outpatient Psychiatric Progress Note    Name: Jolynn Haji   : 1955                    Primary Care Provider: Helga Ibarra MD   The[facility]    PHQ-9 scores:  PHQ-9 SCORE 2023 3/8/2023 3/21/2023   PHQ-9 Total Score - - -   PHQ-9 Total Score MyChart 1 (Minimal depression) 5 (Mild depression) 1 (Minimal depression)   PHQ-9 Total Score 1 5 1   Some encounter information is confidential and restricted. Go to Review Flowsheets activity to see all data.       TIFFANY-7 scores:  TIFFANY-7 SCORE 2023 2023 3/21/2023   Total Score - - -   Total Score 4 (minimal anxiety) 4 (minimal anxiety) 1 (minimal anxiety)   Total Score 4 4 1       Patient Identification:  Patient is a 67 year old,   White Choose not to answer female  who presents for return visit with me.  Patient is currently retired. Patient attended the phone/video session with her daughter Malini rucker. Patient prefers to be called: \" Azalea\".    Interim History:  I last saw Jolynn Haji for outpatient psychiatry return visit on 3/9/2023. During that appointment, we:     Continue Ambien CR 6.25 mg at bedtime as needed for sleep. Do not take with alcohol or opioid pain " "medication. Make sure to have 8-10 hours to devote to sleep after taking. Careful/caution for falls in middle of night. Discussed sleep medicine referral previously but patient has declined-could consider in future.     Increase Effexor-XR/venlafaxine ER back to 150 mg daily for depression and anxiety.     Start propranolol 10 mg twice daily for anxiety, tremor, headaches.    Continue to follow with neurology as they recommend.      Continue all other cares per primary care provider.     3/23: Patient doing very well.  Propranolol has been quite helpful for her anxiety and slight tremor symptoms.  No side effects that she has noted.  Still has some anxiety and headaches, although decreased frequency.  She is wondering if increasing the dose could be helpful.  Completed neuropsychiatric testing.  No major concerns brought up through testing.  No acute safety concerns.  No SI.  No problematic drug or alcohol use.    Per [facility], [facility], during today's team-based visit:   update: Reports anxiety has decreased a lot on the propanol. Continues to have headaches but only about half the days. She has noticed a stronger correlation with headaches and anxiety. She completed Neuro Psych testing. Relieved she has no new dx. Planning on walking with dog. She is feeling more like her self, \"5 star days.\"    Stresses: Neuro psych testing and going to wake alone was stressful. Felt she managed very well.   Appetite: unremarkable  Sleep: unremarkable  Therapy: Continues to do weekly therapy. [facility] she worried a lot about testing and did not , lesson in not worrying about things that have not happened yet. Feeling very accomplished after attending a wake alone without her . She stated it went very well and anxiety was lower than normal and then expected. Discussed decision making in the moment. Discussed it can be difficult to make decisions in the moment. Continues with coping skills, ie meditation and breathing " "[facility].   FARIDA: na  Preg: na  Most important: Wants to address doses of medications.       Past Psychiatric Med Trials:  Psych Meds at Intake:  lexapro 20 mg daily  abilify 5 mg daily      Past Psych Meds:  Amitriptyline  Quetiapine  Mirtazapine - stopped \"because I was sleeping through the night\"    Psychiatric ROS:  Jolynn Haji reports mood has been: Improved  Anxiety has been: Improved, see HPI above  Sleep has been: Relatively stable  Seema sxs: None  Psychosis sxs: N/A  ADHD/ADD sxs: N/A  PTSD sxs: N/A  PHQ9 and GAD7 scores were reviewed today if completed.   Medication side effects: Denies propranolol; possibly headaches and fatigue from venlafaxine  Current stressors include: Symptoms and See HPI above  Coping mechanisms and supports include: Family, Hobbies and Friends    Current medications include:   Current Outpatient Medications   Medication Sig     clindamycin (CLINDAGEL) 1 % external gel Apply topically daily     lactobacillus rhamnosus, GG, (CULTURELL) capsule Take 1 capsule by mouth daily Taking every other day     lisinopril (ZESTRIL) 5 MG tablet Takes 1/2 tablet to equal 2.5 mg     propranolol (INDERAL) 10 MG tablet Take 1 tablet (10 mg) by mouth 2 times daily     venlafaxine (EFFEXOR XR) 150 MG 24 hr capsule Take 150 mg by mouth daily     zolpidem ER (AMBIEN CR) 6.25 MG CR tablet Take 1 tablet (6.25 mg) by mouth nightly as needed for sleep     Current Facility-Administered Medications   Medication     lidocaine 1 % 4 mL     lidocaine 1 % injection 3 mL     lidocaine 1 % injection 4 mL     triamcinolone (KENALOG-40) injection 40 mg       Past Medical/Surgical History:  Past Medical History:   Diagnosis Date     Abnormal glandular Papanicolaou smear of cervix- ASCUS in 2000 12/5/2003    ASC H- 2000     Basal cell carcinoma of leg, right 05/2016    Dr Rogers     Colon polyps 05/2018    tubular adenoam - due 5 yrs - 2 mm     Concussion without loss of consciousness 4/12/2017     Hidradenitis     left  " groin     HSIL (high grade squamous intraepithelial lesion) on Pap smear of cervix 7/5/2000    Normal paps from 2001 to 2016/cc     Hypertension goal BP (blood pressure) < 140/90      OA (osteoarthritis) of knee     moderate     Postmenopausal since 7/2008      Shingles 7/23/2010    left      Squamous cell carcinoma of skin, unspecified      Synovial cyst of popliteal space       has a past medical history of Abnormal glandular Papanicolaou smear of cervix- ASCUS in 2000 (12/5/2003), Basal cell carcinoma of leg, right (05/2016), Colon polyps (05/2018), Concussion without loss of consciousness (4/12/2017), Hidradenitis, HSIL (high grade squamous intraepithelial lesion) on Pap smear of cervix (7/5/2000), Hypertension goal BP (blood pressure) < 140/90, OA (osteoarthritis) of knee, Postmenopausal (since 7/2008 ), Shingles (7/23/2010), Squamous cell carcinoma of skin, unspecified, and Synovial cyst of popliteal space.    She has no past medical history of Malignant melanoma (H) or Squamous cell carcinoma.    Social History:  Reviewed. No changes to social history except as noted above in HPI.    Vital Signs:   None. This is phone/video visit.     Labs:  Most recent laboratory results reviewed and the pertinent results include:   TSH   Date Value Ref Range Status   02/08/2023 1.65 0.30 - 4.20 uIU/mL Final   04/25/2022 1.30 0.40 - 4.00 mU/L Final   01/13/2021 2.07 0.40 - 4.00 mU/L Final     Lab Results   Component Value Date    WBC 8.1 04/25/2022    WBC 6.4 01/13/2021     Lab Results   Component Value Date    RBC 4.58 04/25/2022    RBC 4.54 01/13/2021     Lab Results   Component Value Date    HGB 13.6 04/25/2022    HGB 13.3 01/13/2021     Lab Results   Component Value Date    HCT 41.1 04/25/2022    HCT 41.7 01/13/2021     No components found for: MCT  Lab Results   Component Value Date    MCV 90 04/25/2022    MCV 92 01/13/2021     Lab Results   Component Value Date    MCH 29.7 04/25/2022    MCH 29.3 01/13/2021     Lab  Results   Component Value Date    MCHC 33.1 04/25/2022    MCHC 31.9 01/13/2021     Lab Results   Component Value Date    RDW 12.9 04/25/2022    RDW 13.1 01/13/2021     Lab Results   Component Value Date     04/25/2022     01/13/2021     Last Comprehensive Metabolic Panel:  Sodium   Date Value Ref Range Status   02/08/2023 141 136 - 145 mmol/L Final   01/13/2021 140 133 - 144 mmol/L Final     Potassium   Date Value Ref Range Status   02/08/2023 4.1 3.4 - 5.3 mmol/L Final   04/25/2022 4.4 3.4 - 5.3 mmol/L Final   01/13/2021 4.1 3.4 - 5.3 mmol/L Final     Chloride   Date Value Ref Range Status   02/08/2023 103 98 - 107 mmol/L Final   04/25/2022 105 94 - 109 mmol/L Final   01/13/2021 108 94 - 109 mmol/L Final     Carbon Dioxide   Date Value Ref Range Status   01/13/2021 28 20 - 32 mmol/L Final     Carbon Dioxide (CO2)   Date Value Ref Range Status   02/08/2023 25 22 - 29 mmol/L Final   04/25/2022 26 20 - 32 mmol/L Final     Anion Gap   Date Value Ref Range Status   02/08/2023 13 7 - 15 mmol/L Final   04/25/2022 6 3 - 14 mmol/L Final   01/13/2021 4 3 - 14 mmol/L Final     Glucose   Date Value Ref Range Status   02/08/2023 95 70 - 99 mg/dL Final   04/25/2022 104 (H) 70 - 99 mg/dL Final   01/13/2021 91 70 - 99 mg/dL Final     Comment:     Fasting specimen     Urea Nitrogen   Date Value Ref Range Status   02/08/2023 16.2 8.0 - 23.0 mg/dL Final   04/25/2022 23 7 - 30 mg/dL Final   01/13/2021 18 7 - 30 mg/dL Final     Creatinine   Date Value Ref Range Status   02/08/2023 0.62 0.51 - 0.95 mg/dL Final   01/13/2021 0.68 0.52 - 1.04 mg/dL Final     GFR Estimate   Date Value Ref Range Status   02/08/2023 >90 >60 mL/min/1.73m2 Final     Comment:     eGFR calculated using 2021 CKD-EPI equation.   01/13/2021 >90 >60 mL/min/[1.73_m2] Final     Comment:     Non  GFR Calc  Starting 12/18/2018, serum creatinine based estimated GFR (eGFR) will be   calculated using the Chronic Kidney Disease Epidemiology  Collaboration   (CKD-EPI) equation.       Calcium   Date Value Ref Range Status   02/08/2023 9.7 8.8 - 10.2 mg/dL Final   01/13/2021 8.8 8.5 - 10.1 mg/dL Final     Bilirubin Total   Date Value Ref Range Status   02/08/2023 0.5 <=1.2 mg/dL Final   01/13/2021 0.8 0.2 - 1.3 mg/dL Final     Alkaline Phosphatase   Date Value Ref Range Status   02/08/2023 73 35 - 104 U/L Final   01/13/2021 72 40 - 150 U/L Final     ALT   Date Value Ref Range Status   02/08/2023 19 10 - 35 U/L Final   01/13/2021 17 0 - 50 U/L Final     AST   Date Value Ref Range Status   02/08/2023 22 10 - 35 U/L Final   01/13/2021 13 0 - 45 U/L Final     Review of Systems:  10 systems (general, cardiovascular, respiratory, eyes, ENT, endocrine, GI, , M/S, neurological) were reviewed. Most pertinent finding(s) is/are: Some chronic pains, see HPI above. The remaining systems are all unremarkable.    Mental Status Examination (limited as this is by phone/video):  Appearance: Awake, alert, appears stated age, no acute distress, well-groomed   Attitude:  cooperative, pleasant   Motor: No gross abnormalities noted today.  Not formally tested  Oriented to:  person, place, time, and situation  Attention Span and Concentration:  normal  Speech: Normal volume, normal rate, normal rhythm, coherent, no longer very delayed, no longer very hesitant  Language: intact  Mood: Better  Affect: Overall appropriate and mood congruent  Associations:  no loose associations  Thought Process:  logical, linear and goal oriented  Thought Content:  no evidence of suicidal ideation or homicidal ideation, no evidence of psychotic thought, no auditory hallucinations present and no visual hallucinations present  Recent and Remote Memory: Intact to interview.  Not formally assessed.   Fund of Knowledge: appropriate  Insight: Good  Judgment:  intact, adequate for safety  Impulse Control:  intact    Suicide Risk Assessment:  Today Lizzetha L Adithya reports no suicidal ideation. Based on all  available evidence including the factors cited above, Jolynn Haji does not appear to be at imminent risk for self-harm, does not meet criteria for a 72-hr hold, and therefore remains appropriate for ongoing outpatient level of care.  A thorough assessment of risk factors related to suicide and self-harm have been reviewed and are noted above. The patient convincingly denies suicidality on several occasions. Local community safety resources reviewed for patient to use if needed. There was no deceit detected, and the patient presented in a manner that was believable.     DSM5 Diagnosis:  300.02 (F41.1) Generalized Anxiety Disorder   R/O Mood Disorder  Insomnia, unspecified  H/O TBI    Suspected drug-induced parkinsonism  R/O Underlying Parkinson's Disease (pt working with neurology)    Medical comorbidities include:   Patient Active Problem List    Diagnosis Date Noted     Essential hypertension with goal blood pressure less than 140/90 07/16/2001     Priority: High     Drug-induced parkinsonism (H)- Abilify - generic = ARIPiprazole( 5mg)  01/02/2023     Priority: Medium     Mood disorder (H) 01/02/2023     Priority: Medium     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 - much improved - essentially gone   07/07/2022     Priority: Medium     Adjustment insomnia 12/23/2021     Priority: Medium     Screening for cervical cancer      Priority: Medium     1999 NIL  2000 ASCUS-H.  >> Colpo: Neg  2001 NIL   2003 NIL pap, Neg HPV  [id number], 2016  NIL pap  7/25/19 NIL pap, Neg HPV    Criteria necessary to stop screening per ASCCP guidelines:  Older than 65 years  Three consecutive negative cytology results or two consecutive negative cotest results within the previous 10 years, with the most recent being performed within the last 5 years.   (Women with hx of MADDIE 2 or 3, or adenocarcinoma in situ should continue screening for full 20 years, even if this goes past age  65).         Hyperlipidemia LDL goal <130 11/30/2018     Priority: Medium     Basal cell carcinoma      Priority: Medium     Colon polyps 05/01/2018     Priority: Medium     Primary localized osteoarthrosis of left lower leg 02/19/2018     Priority: Medium     Other secondary osteoarthritis of left knee 01/29/2018     Priority: Medium     Chronic constipation 09/14/2017     Priority: Medium     Raynaud's phenomenon without gangrene 09/14/2017     Priority: Medium     Insomnia, unspecified type 07/25/2017     Priority: Medium     Generalized anxiety disorder 05/23/2017     Priority: Medium     Adjustment disorder with mixed anxiety and depressed mood- mild-moderate  at this time 05/11/2017     Priority: Medium     Family history of celiac disease 05/11/2017     Priority: Medium     Basal cell carcinoma of leg, right 05/01/2016     Priority: Medium     Sun-damaged skin 04/21/2016     Priority: Medium     Seasonal allergic rhinitis 04/21/2016     Priority: Medium     Dupuytren's disease of palm - right 4th digit flexor tendon 06/11/2015     Priority: Medium     History of migraine headaches- assoc. with nausea/vomiting - resolved around menopause - were  premenstrual  01/08/2014     Priority: Medium     Rosacea 05/01/2013     Priority: Medium     Numbness of feet- distal feet R>L  02/21/2011     Priority: Medium     NUMBNESS AND TINGLING OF FOOT - bilateral -mild  08/25/2010     Priority: Medium     Osteopenia 01/02/2009     Priority: Medium     Lipidosis 10/24/2006     Priority: Medium     Problem list name updated by automated process. Provider to review       Localized osteoarthritis of hand 11/01/2005     Priority: Medium     Problem list name updated by automated process. Provider to review       Stress incontinence, female - mild 10/23/2012     Priority: Low     Cough 10/23/2012     Priority: Low     Acute bronchitis- recurrent- ? related to mold in school building- pt has since retired and no further bronchitis  10/23/2012     Priority: Low     Postmenopausal      Priority: Low     DERMATOPHYTOSIS OF NAIL- toenails  03/14/2006     Priority: Low     trichophyton rubrum on culture - 1/06        Synovial cyst of popliteal space      Priority: Low     Hidradenitis 12/05/2003     Priority: Low       Psychosocial & Contextual Factors: see HPI above    Assessment:  From Intake, 5/12/2022:  Jolynn Haji is a 66-year-old female with a history including anxiety, depression, insomnia status post concussion in April 2017.  No major mental health struggles prior to concussion in 2017.  Patient was started on Lexapro, quetiapine, mirtazapine to help with her symptoms.  Patient had been feeling much better on that medication combination and once she was sleeping well again quetiapine and mirtazapine were discontinued.  This past January the patient started to have some worsening symptoms and Abilify was added.  Patient has felt like her current medications of Lexapro and Abilify have been helpful but reports starting to feel really slowed and numb the past several weeks.  She also reported a weight loss of nearly 20 pounds over the last 6 weeks versus several months (patient was not very sure). I am wondering if she could be experiencing side effects of her increased Lexapro dose.  She is agreeable to decreasing her dose slightly to 15 mg daily.  Could also consider decreasing Abilify in the case it is not her Lexapro.  She also desires to sleep a little bit better than she has been.  Could consider restarting mirtazapine at bedtime for sleep and to also augment her Lexapro.  Could also be beneficial for patient to undergo cognitive screening to check in and see what her baseline is status post concussion.  No acute safety concerns.  No SI.  No problematic drug or alcohol use.    5/24/2022:  Patient with some slight improvements of possible medication related side effects after decreasing Lexapro.  I am wondering if there might be more  robust improvements with decreasing Abilify/aripiprazole.  Possible drug-induced Parkinsonism?  She does seem to have delayed/slowed speech, masked facies.  Her gait and other movements unable to be observed but she does report other struggles that may be consistent with such a presentation.  We are going to decrease her Abilify down to 2 mg daily and I will see her back in just a few weeks.  We will likely discontinue her Abilify at her next visit.  I will message her primary care provider to get some collateral regarding previous baseline functioning prior to starting Abilify.  History of TBI could be confounding presentation.  Could consider neurology consult.    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  Instructed to stop Abilify today.  We will monitor for improvement of symptoms.  If symptoms do not improve may need to refer to neurology.  If symptoms start to improve we will consider low-dose methylphenidate augmentation of Lexapro next visit to help with low energy, mood, focus and concentration, cognition, s/p TBI if still clinically indicated.  We would discuss risks and benefits with patient and her daughter at that time.  No other acute safety concerns or SI.  No problematic drug or alcohol use.  If patient starts to struggle with sleep would restart mirtazapine at bedtime.    7/28/2022:  Pt still with sxs consistent with drug-induced Parksinsonism.  Neurology consult will be placed so that patient might get scheduled sooner than later due to the typically long wait.  We will start Ambien in hopes we can get her sleeping much better.  Discussed risks and benefits of its use, particularly in the geriatric population.  Lexapro has not been as helpful as it helped for her anxiety.  We will taper this medication and start Effexor/venlafaxine in hopes that might be more effective for her symptoms.  No acute safety concerns.  No SI.  No problematic drug or alcohol  use.    8/11/2022:  Overall with continued improved symptoms.  Hopefully patient will continue to have improvement of her symptoms off of Abilify.  Patient will be seeing neurology soon.  Encouraged to keep appointment.  We will continue taper off of Lexapro and continue on monotherapy with venlafaxine.  We will also continue Ambien at this time for sleep since patient is sleeping much better on the medication with improved symptoms and tolerating well.  No acute safety concerns.  No problematic drug or alcohol use.    9/1/2022:  Ongoing anxiety, still intense at times.  Still not sleeping very well.  Could consider sleep evaluation, but patient quite overwhelmed right now with various referrals and doctors appointments.  Ambien had been working quite well but not keeping her sleep now.  Still falling asleep relatively well.  We will transition to Ambien CR to see if that is more helpful.  We will continue to consider sleep evaluation to rule out possibility of REM behavior sleep disorder.  We will also transition venlafaxine to bedtime to see if that helps with any possible nausea.  Working with neurology to monitor for Parkinson's disease.  I do recommend neuropsychological testing due to ongoing possible cognitive difficulties/struggles.  No acute safety concerns.  No SI.  No problematic drug or alcohol use.    10/3/2022:  Patient overall with some ongoing anxiety but slowly improving since it got worse late August.  Recommend continuing to optimize Effexor-XR, especially since patient tolerating well.  No longer having any nausea now that dose is taken at bedtime.  Patient feeling a little foggy and off balance with Ambien CR and so we will switch back to Ambien immediate release.  No falls.  Recommend increasing Effexor-XR first and once well tolerated switching back to Ambien immediate release.  May need to continue to work on alternative options for sleep if does not do well back on Ambien immediate release.   No acute safety concerns.  No SI.  No problematic drug or alcohol use.  Encouraged to continue working with neurology.  Next appointment in November.    10/24/2022:  Patient overall with some improved anxiety on increased dose of Effexor-XR.  Could use additional improvement of anxiety and so we will continue to optimize therapy with Effexor-XR.  We will increase dose to 150 mg daily.  Tolerating well with no notable negative side effects.  Patient transitioned back to Ambien CR and is sleeping better on this compared to Ambien immediate release.  We will continue Ambien CR for sleep.  Discussed possibility of sleep medicine referral but patient declines at this time.  Could consider in the future.  No acute safety concerns.  No SI.  No problematic drug or alcohol use.  Encouraged to continue working with neurology.    12/09/2022:  Patient overall doing fairly well.  Feels like increased dose of venlafaxine has been helpful.  I do wonder if she is having some subtle withdrawal symptoms late in the afternoon with headache and fatigue.  Discussed shifting her dose to the morning.  However, she reports she will try to drink more water and see if that is helpful before trying to make changes to her medication.  She feels like things are going well enough that she really does not want to change anything.  Encouraged to continue in therapy.  No acute safety concerns.  No SI.  No problematic drug or alcohol use.    1/19/2023:  Patient overall doing well with ongoing improvements.  Patient even recently starting to drive again which is a big deal.  She continues in therapy and finds it helpful.  Discussed possible changes to her dosing to see if headaches might improve, along with fatigue.  Discussed possibly splitting her Effexor-XR dose as 75 mg twice daily versus 37 point 5 in the morning and 112.5 mg at bedtime.  Patient feels like she is in a good place right now and really does not want to make any medication changes  if possible.  We will keep things the same for now.  Encouraged to drink lots of water.  No acute safety concerns.  No SI.  No problematic drug or alcohol use.    3/9/2023:  Since last visit we decreased Effexor-XR just slightly.  Unfortunately her anxiety has worsened on decreased dose.  Headaches and fatigue did not improve at all.  Since anxiety worsened we will go back up to 150 mg daily.  Patient prefers this over a trial of further decreased dose.  We will start a trial with propranolol to see if this helps improve anxiety and headaches.  May also help with her tremor.  Discussed watching for feeling too lightheaded or dizzy.  Patient also on low-dose lisinopril.  Last vital signs blood pressure 132/78 and pulse 96 on 2/24/2023.  No acute safety concerns.  No SI.  No problematic drug or alcohol use.    3/23/2023:  Patient overall doing very well.  Propranolol has been quite helpful.  Still having some anxiety and headaches so we will continue to optimize therapy with propranolol some.  We will increase to 20 mg twice daily.  Neuropsychiatric testing reviewed.  Some mild deficits but nothing indicating a neurocognitive disorder at this time.  This was reassuring to the patient.  Patient will review results with neurologist at upcoming appointment in May.  No acute safety concerns.  No SI.  No problematic drug or alcohol use.    Medication side effects and alternatives were reviewed. Health promotion activities recommended and reviewed today. All questions addressed. Education and counseling completed regarding risks and benefits of medications and psychotherapy options. Recommend therapy for additional support.     Treatment Plan:    Continue Ambien CR 6.25 mg at bedtime as needed for sleep. Do not take with alcohol or opioid pain medication. Make sure to have 8-10 hours to devote to sleep after taking. Careful/caution for falls in middle of night. Discussed sleep medicine referral previously but patient has  declined-could consider in future.     Continue Effexor-XR/venlafaxine  mg daily for depression and anxiety.     Increase propranolol to 20 mg twice daily for anxiety, tremor, headaches.    Continue to follow with neurology as they recommend.      Continue all other cares per primary care provider.     Continue all other medications as reviewed per electronic medical record today.     Safety plan reviewed. To the Emergency Department as needed or call after hours crisis line at [phone] or [phone]. [place] Crisis Text Line. Text [place] to 263572 or Suicide LifeLine Chat: suicidepreventionlifeline.org/chat    Continue individual psychotherapy for additional support and ongoing development of nonpharmacologic coping skills and strategies.    Schedule an appointment with me in 4-6 weeks or sooner as needed. Call [facility] at [phone] to schedule.    Follow up with primary care provider as planned or for acute medical concerns.    Call the psychiatric nurse line with medication questions or concerns at [phone].    [facility] may be used to communicate with your provider, but this is not intended to be used for emergencies.    Administrative Billing:   Phone Call/Video Duration: 13 Minutes  Start: 11:19a  Stop: 11:32a    Patient Status:  Patient will continue to be seen for ongoing consultation and stabilization.    Signed:   Judy Pompa DO  [facility]    Disclaimer: This note consists of symbols derived from keyboarding, dictation and/or voice recognition software. As a result, there may be errors in the script that have gone undetected. Please consider this when interpreting information found in this chart.

## 2023-03-23 NOTE — PATIENT INSTRUCTIONS
Treatment Plan:  Continue Ambien CR 6.25 mg at bedtime as needed for sleep. Do not take with alcohol or opioid pain medication. Make sure to have 8-10 hours to devote to sleep after taking. Careful/caution for falls in middle of night. Discussed sleep medicine referral previously but patient has declined-could consider in future.   Continue Effexor-XR/venlafaxine  mg daily for depression and anxiety.   Increase propranolol to 20 mg twice daily for anxiety, tremor, headaches.  Continue to follow with neurology as they recommend.    Continue all other cares per primary care provider.   Continue all other medications as reviewed per electronic medical record today.   Safety plan reviewed. To the Emergency Department as needed or call after hours crisis line at [phone] or [phone]. [place] Crisis Text Line. Text [place] to 688777 or Suicide LifeLine Chat: suicidepreventionlifeline.org/chat  Continue individual psychotherapy for additional support and ongoing development of nonpharmacologic coping skills and strategies.  Schedule an appointment with me in 4-6 weeks or sooner as needed. Call [facility] at [phone] to schedule.  Follow up with primary care provider as planned or for acute medical concerns.  Call the psychiatric nurse line with medication questions or concerns at [phone].  MyChart may be used to communicate with your provider, but this is not intended to be used for emergencies.

## 2023-03-23 NOTE — Clinical Note
Please call this patient to get them scheduled for a follow-up visit in 4-6 weeks. Please schedule with me and the [facility]. Thanks!

## 2023-03-24 ENCOUNTER — OFFICE VISIT (OUTPATIENT)
Dept: FAMILY MEDICINE | Facility: CLINIC | Age: 68
End: 2023-03-24
Payer: COMMERCIAL

## 2023-03-24 VITALS
HEART RATE: 84 BPM | SYSTOLIC BLOOD PRESSURE: 122 MMHG | HEIGHT: 68 IN | RESPIRATION RATE: 18 BRPM | OXYGEN SATURATION: 98 % | BODY MASS INDEX: 20.76 KG/M2 | TEMPERATURE: 98.6 F | DIASTOLIC BLOOD PRESSURE: 84 MMHG | WEIGHT: 137 LBS

## 2023-03-24 DIAGNOSIS — F43.23 ADJUSTMENT DISORDER WITH MIXED ANXIETY AND DEPRESSED MOOD: Primary | ICD-10-CM

## 2023-03-24 DIAGNOSIS — R41.843: ICD-10-CM

## 2023-03-24 PROCEDURE — 99214 OFFICE O/P EST MOD 30 MIN: CPT | Performed by: FAMILY MEDICINE

## 2023-03-24 ASSESSMENT — PATIENT HEALTH QUESTIONNAIRE - PHQ9
10. IF YOU CHECKED OFF ANY PROBLEMS, HOW DIFFICULT HAVE THESE PROBLEMS MADE IT FOR YOU TO DO YOUR WORK, TAKE CARE OF THINGS AT HOME, OR GET ALONG WITH OTHER PEOPLE: NOT DIFFICULT AT ALL
SUM OF ALL RESPONSES TO PHQ QUESTIONS 1-9: 1

## 2023-03-24 NOTE — PROGRESS NOTES
Assessment & Plan :       ICD-10-CM    1. Adjustment disorder with mixed anxiety and depressed mood- mild  at this time- improving steadily with medication and therapy   F43.23       2. 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 - much improved - essentially gone    R41.843             Pt almost back to baseline mood.    After pt is comfortable on newer dose of propranolol 20mg twice daily,   on a low stress night, Pt will try not to take the ambien.   She will expect a transition and perhaps not great sleep for several weeks during that time.     Return in 6 weeks (on 5/5/2023) for depression/anxiety follow up, w/ Dr. SUE for 40 minute appointment.     Prescription drug management  40 minutes spent by me on the date of the encounter doing chart review, history and exam, documentation and further activities per the note       MEDICATIONS:   No orders of the defined types were placed in this encounter.         - Continue other medications without change  Regular exercise  See Patient Instructions         Helga Ibarra MD  [facility] [place]        Subjective :   Azalea is a 67 year old, presenting for the following health issues:  Mental Health Problem  No flowsheet data found.  History of Present Illness       Mental Health Follow-up:  Patient presents to follow-up on Depression & Anxiety.Patient's depression since last visit has been:  Good  The patient is not having other symptoms associated with depression.  Patient's anxiety since last visit has been:  Better  The patient is having other symptoms associated with anxiety.  Any significant life events: No  Patient is feeling anxious or having panic attacks.  Patient has no concerns about alcohol or drug use.    Hypertension: She presents for follow up of hypertension.  She does check blood pressure  regularly outside of the clinic. Outpatient blood pressures have not been over  "140/90. She does not follow a low salt diet.     Headaches:   Since the patient's last clinic visit, headaches are: no change  The patient is getting headaches:  Daily  She is able to do normal daily activities when she has a migraine.  The patient is taking the following rescue/relief medications:  Tylenol   Patient states \"I get some relief\" from the rescue/relief medications.   The patient is taking the following medications to prevent migraines:  No medications to prevent migraines  In the past 4 weeks, the patient has gone to an Urgent Care or Emergency Room 0 times times due to headaches.    She eats 2-3 servings of fruits and vegetables daily.She consumes 0 sweetened beverage(s) daily.She exercises with enough effort to increase her heart rate 10 to 19 minutes per day.  She exercises with enough effort to increase her heart rate 4 days per week.   She is taking medications regularly.    Today's PHQ-9         PHQ-9 Total Score: 1    PHQ-9 Q9 Thoughts of better off dead/self-harm past 2 weeks :   Not at all    How difficult have these problems made it for you to do your work, take care of things at home, or get along with other people: Not difficult at all     PHQ 3/8/2023 3/21/2023 3/23/2023   PHQ-9 Total Score 5 1 1   Q9: Thoughts of better off dead/self-harm past 2 weeks Not at all Not at all Not at all   Some encounter information is confidential and restricted. Go to Review Flowsheets activity to see all data.     TIFFANY-7 SCORE 2/23/2023 3/21/2023 3/23/2023   Total Score - - -   Total Score 4 (minimal anxiety) 1 (minimal anxiety) 5 (mild anxiety)   Total Score 4 1 5     Discuss knee- left-  had a steroid injection back in October 2022, helped. Starting to feel sore again. Has had PT and seen orthopedics.     Pt did the neuropsych testing at the [facility] with Dr. Kassie Ashton and Danny Bone - testing .  Went over those findings with pt today. All very reassuring.    From the neuropsych testing " "summary:    \"Overall, Ms. Haji exhibited mildly impaired scores on a test of visual learning and memory, with variability across tests of executive functioning, in the context of other well preserved cognitive abilities. These findings are nonspecific but are not suggestive of acquired neurologic disease or neurodegenerative process. While she did not report experiencing clinically significant levels of anxiety or depression at present, psychological symptoms, including anxiety, depression, stress, lack of refreshing sleep, and headaches may intermittently interfere with cognitive effectiveness, typically through interfering with normal attention and efficiency of information processing. These difficulties can compromise other aspects of cognition such as memory, divided attention, and organization. Continuing to address mental health symptoms may not only aid in continued improvement of her mood and daily functioning but may also improve her daily cognitive efficiency.\"     This past week she had a wake to go to, their  in [place] passed away and pt went to the wake by herself and was very comfortable doing so. 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     Had some headaches  after Dr. Pompa from psychiatry adjusted her dose of venlafaxine and then also starting on propranolol 20mg po bid for her headaches, tremor, and anxiety.  Headaches are a bit better now , but will be  increasing the dose of propranolol to 20mg po bid starting tonight.    No shortness of breath with walking.        Seeing Erika her therapist once a week still.      She's looking forward [place] her [place]           Patient Active Problem List   Diagnosis     Essential hypertension with goal blood pressure less than 140/90     Hidradenitis     Localized osteoarthritis of hand     Synovial cyst of popliteal space "     DERMATOPHYTOSIS OF NAIL- toenails      Lipidosis     Osteopenia     Postmenopausal     NUMBNESS AND TINGLING OF FOOT - bilateral -mild      Numbness of feet- distal feet R>L      Stress incontinence, female - mild     Cough     Acute bronchitis- recurrent- ? related to mold in school building- pt has since retired and no further bronchitis     Rosacea     History of migraine headaches- assoc. with nausea/vomiting - resolved around menopause - were  premenstrual      Dupuytren's disease of palm - right 4th digit flexor tendon     Sun-damaged skin     Seasonal allergic rhinitis     Basal cell carcinoma of leg, right     Adjustment disorder with mixed anxiety and depressed mood- mild-moderate  at this time     Family history of celiac disease     Generalized anxiety disorder     Insomnia, unspecified type     Chronic constipation     Raynaud's phenomenon without gangrene     Other secondary osteoarthritis of left knee     Primary localized osteoarthrosis of left lower leg     Hyperlipidemia LDL goal <130     Basal cell carcinoma     Colon polyps     Screening for cervical cancer     Adjustment insomnia     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 - much improved - essentially gone       Drug-induced parkinsonism (H)- Abilify - generic = ARIPiprazole( 5mg)      Mood disorder (H)       Current Outpatient Medications   Medication Sig Dispense Refill     clindamycin (CLINDAGEL) 1 % external gel Apply topically daily 60 g 11     lactobacillus rhamnosus, GG, (CULTURELL) capsule Take 1 capsule by mouth daily Taking every other day       lisinopril (ZESTRIL) 5 MG tablet Takes 1/2 tablet to equal 2.5 mg       propranolol (INDERAL) 20 MG tablet Take 1 tablet (20 mg) by mouth 2 times daily 180 tablet 1     venlafaxine (EFFEXOR XR) 150 MG 24 hr capsule Take 150 mg by mouth daily       zolpidem ER (AMBIEN CR) 6.25 MG CR tablet Take 1 tablet (6.25 mg) by mouth nightly as  "needed for sleep 90 tablet 1          Allergies   Allergen Reactions     Abilify [Aripiprazole] Other (See Comments)     Suspected drug-induced Parkinsonism      Fosamax Diarrhea     Diarrhea, stomach cramps, jaw pain      Amoxicillin-Pot Clavulanate Itching and Rash     Augmentin Itching and Rash          Review of Systems   Constitutional, HEENT, cardiovascular, pulmonary, GI, , musculoskeletal, neuro, skin, endocrine and psych systems are negative, except as otherwise noted.      Objective    /84   Pulse 84   Temp 98.6  F (37  C)   Resp 18   Ht 1.715 m (5' 7.5\")   Wt 62.1 kg (137 lb)   LMP 06/28/2001   SpO2 98%   BMI 21.14 kg/m    Body mass index is 21.14 kg/m .  Physical Exam   GENERAL: healthy, alert and no distress  EYES: Eyes grossly normal to inspection, PERRL and conjunctivae and sclerae normal  RESP: lungs clear to auscultation - no rales, rhonchi or wheezes  CV: regular rate and rhythm, normal S1 S2, no S3 or S4, no murmur, click or rub, no peripheral edema and peripheral pulses strong  MS: no gross musculoskeletal defects noted, no edema  SKIN: no suspicious lesions or rashes  NEURO: Normal strength and tone, mentation intact and speech normal  PSYCH: mentation appears normal, affect normal/bright. Alert and oriented. No acute distress. Appears well-groomed and casually dressed. Affect is near normal, not particularly depressed. In good humor and laughs appropriately. Not particularly anxious. No evidence of psychosis.     Lab on 02/08/2023   Component Date Value Ref Range Status     Cholesterol 02/08/2023 222 (H)  <200 mg/dL Final     Triglycerides 02/08/2023 60  <150 mg/dL Final     Direct Measure HDL 02/08/2023 81  >=50 mg/dL Final     LDL Cholesterol Calculated 02/08/2023 129 (H)  <=100 mg/dL Final     Non HDL Cholesterol 02/08/2023 141 (H)  <130 mg/dL Final     25 OH Vitamin D2 02/08/2023 <5  ug/L Final     25 OH Vitamin D3 02/08/2023 27  ug/L Final     25 OH Vit D Total 02/08/2023 " <32  20 - 75 ug/L Final    Season, race, dietary intake, and treatment affect the concentration of 25-hydroxy-Vitamin D. Values may decrease during winter months and increase during summer months. Values 20-29 ug/L may indicate Vitamin D insufficiency and values <20 ug/L may indicate Vitamin D deficiency.     Sodium 02/08/2023 141  136 - 145 mmol/L Final     Potassium 02/08/2023 4.1  3.4 - 5.3 mmol/L Final     Chloride 02/08/2023 103  98 - 107 mmol/L Final     Carbon Dioxide (CO2) 02/08/2023 25  22 - 29 mmol/L Final     Anion Gap 02/08/2023 13  7 - 15 mmol/L Final     Urea Nitrogen 02/08/2023 16.2  8.0 - 23.0 mg/dL Final     Creatinine 02/08/2023 0.62  0.51 - 0.95 mg/dL Final     Calcium 02/08/2023 9.7  8.8 - 10.2 mg/dL Final     Glucose 02/08/2023 95  70 - 99 mg/dL Final     Alkaline Phosphatase 02/08/2023 73  35 - 104 U/L Final     AST 02/08/2023 22  10 - 35 U/L Final     ALT 02/08/2023 19  10 - 35 U/L Final     Protein Total 02/08/2023 6.9  6.4 - 8.3 g/dL Final     Albumin 02/08/2023 4.5  3.5 - 5.2 g/dL Final     Bilirubin Total 02/08/2023 0.5  <=1.2 mg/dL Final     GFR Estimate 02/08/2023 >90  >60 mL/min/1.73m2 Final    eGFR calculated using 2021 CKD-EPI equation.     TSH 02/08/2023 1.65  0.30 - 4.20 uIU/mL Final     WBC Count 02/08/2023 4.8  4.0 - 11.0 10e3/uL Final     RBC Count 02/08/2023 4.46  3.80 - 5.20 10e6/uL Final     Hemoglobin 02/08/2023 13.4  11.7 - 15.7 g/dL Final     Hematocrit 02/08/2023 40.4  35.0 - 47.0 % Final     MCV 02/08/2023 91  78 - 100 fL Final     MCH 02/08/2023 30.0  26.5 - 33.0 pg Final     MCHC 02/08/2023 33.2  31.5 - 36.5 g/dL Final     RDW 02/08/2023 13.0  10.0 - 15.0 % Final     Platelet Count 02/08/2023 261  150 - 450 10e3/uL Final

## 2023-03-24 NOTE — PATIENT INSTRUCTIONS
[facility]  [address] [place] [address]  Office: [phone]   Fax:    [phone]     Return in 6 weeks (on 5/5/2023) for depression/anxiety follow up, w/ Dr. SUE for 40 minute appointment.     Pt almost back to baseline mood.    After pt is comfortable on newer dose of propranolol 20mg twice daily,   on a low stress night, Pt will try not to take the ambien.   She will expect a transition and perhaps not great sleep for several weeks during that time.     Future Appointments 3/24/2023 - 9/20/2023        Date Visit Type Length Department Provider     3/31/2023 11:00 AM [facility] RETURN 60 min CS BEHAVIORAL HEALTH Erika Francisco LICSW    Location Instructions:     Your appointment is at [facility] located in the [facility] at [address] [place] [address]. Please check in at the  in [address].              4/4/2023  1:30 PM DX HIP/PELVIS/SPINE 30 min RI DEXA [id number]              4/27/2023 10:00 AM [facility] RETURN 30 min CS BEHAVIORAL HEALTH Erika Francisco LICSW    Location Instructions:     Your appointment is at [facility] located in the [facility] at [address] [place] [address]. Please check in at the  in [address].              4/27/2023 10:30 AM [facility] ADULT PSYCHIATRY RETURN 30 min [facility] PSYCHIATRY Judy Pompa DO    Location Instructions:     [facility] has 2 buildings on its campus. Please visit us at [address] [place] [address] and enter the building with the numbers [id number] on it.               5/5/2023  4:20 PM OFFICE VISIT 40 min [facility] Helga Ibarra MD    Location Instructions:     [facility] is located at [address], [address] Free parking is available; access the lot by turning north from [address] onto [address], then west onto [address].               5/16/2023 10:30 AM RETURN 15 min [facility] Ligia Carbajal, JACQUIE    Location Instructions:     [address] [id number]

## 2023-03-28 NOTE — PROGRESS NOTES
[facility] Integrated Behavioral Health  March 31, 2023      Behavioral Health Clinician Progress Note    Patient Name: Jolynn Haji           Service Type:  Individual      Service Location:   MyChart / Email (patient reached)     Session Start Time: 11:00 am  Session End Time: 11:45 am      Session Length: 38 - 52      Attendees: Patient     Service Modality:  Video Visit:      Provider verified identity through the following two step process.  Patient provided:  Patient is known previously to provider    Telemedicine Visit: The patient's condition can be safely assessed and treated via synchronous audio and visual telemedicine encounter.      Reason for Telemedicine Visit: Patient has requested telehealth visit    Originating Site (Patient Location): Patient's home    Distant Site (Provider Location): [facility]    Consent:  The patient/guardian has verbally consented to: the potential risks and benefits of telemedicine (video visit) versus in person care; bill my insurance or make self-payment for services provided; and responsibility for payment of non-covered services.     Patient would like the video invitation sent by:  My Chart    Mode of Communication:  Video Conference via [employer]    As the provider I attest to compliance with applicable laws and regulations related to telemedicine.    Visit Activities (Refresh list every visit): [facility] Only    Diagnostic Assessment Date: 9/27/22  Treatment Plan Review Date: 4/13/23  See Flowsheets for today's PHQ-9 and TIFFANY-7 results  Previous PHQ-9:   PHQ-9 SCORE 3/8/2023 3/21/2023 3/23/2023   PHQ-9 Total Score - - -   PHQ-9 Total Score [facility] 5 (Mild depression) 1 (Minimal depression) 1 (Minimal depression)   PHQ-9 Total Score 5 1 1   Some encounter information is confidential and restricted. Go to Review Flowsheets activity to see all data.     Previous TIFFANY-7:   TIFFANY-7 SCORE 2/23/2023 3/21/2023 3/23/2023  "  Total Score - - -   Total Score 4 (minimal anxiety) 1 (minimal anxiety) 5 (mild anxiety)   Total Score 4 1 5       KARINA LEVEL:  KARINA Score (Last Two) 8/27/2010 2/22/2011   KARINA Raw Score 51 49   Activation Score 91.6 82.8   KARINA Level 4 4       DATA  Extended Session (60+ minutes): No  Interactive Complexity: No  Crisis: No  [facility] Patient: No    Treatment Objective(s) Addressed in This Session:  Target Behavior(s): anxiety and depression    Depressed Mood: Increase interest, engagement, and pleasure in doing things  Decrease frequency and intensity of feeling down, depressed, hopeless  Improve quantity and quality of night time sleep / decrease daytime naps  Feel less tired and more energy during the day   Improve diet, appetite, mindful eating, and / or meal planning  Identify negative self-talk and behaviors: challenge core beliefs, myths, and actions  Improve concentration, focus, and mindfulness in daily activities   Feel less fidgety, restless or slow in daily activities / interpersonal interactions  Anxiety: will experience a reduction in anxiety, will develop more effective coping skills to manage anxiety symptoms, will develop healthy cognitive patterns and beliefs and will increase ability to function adaptively    Current Stressors / Issues:  Reports she is doing well. Anxiety has been pretty good but has headaches when anxious. Looking forward to going to theater with grandchildren. Medications have been very helpful. Discussed ABC of Anxiety. Discussed events of anxiety at its worse. Discussed the beliefs she has about anxiety events. Discussed consequences of event.     [facility]  [facility]: Reports anxiety has decreased a lot on the propanol. Continues to have headaches but only about half the days. She has noticed a stronger correlation with headaches and anxiety. She completed Neuro Psych testing. Relieved she has no new dx. Planning on walking with dog. She is feeling more like her self, \"5 star " "days.\"    Stresses: Neuro psych testing and going to wake alone was stressful. Felt she managed very well.   Appetite: unremarkable  Sleep: unremarkable  Therapy: Continues to do weekly therapy. [place] she worried a lot about testing and did not , lesson in not worrying about things that have not happened yet. Feeling very accomplished after attending a wake alone without her . She stated it went very well and anxiety was lower than normal and then expected. Discussed decision making in the moment. Discussed it can be difficult to make decisions in the moment. Continues with coping skills, ie meditation and breathing tch.   FARIDA: na  Preg: na  Most important: Wants to address doses of medications.     3/14/23  Feels the propanol has been really helping with anxiety. She has been feeling like her self for the past 5 days. Continues to have several headaches. She is considering going back to yoga after looking as some neck stretches.     3/9/23 ([facility])  She has been more anxious on the different dosing of medications. She has also not noticed decrease in headaches. Discussed neck tension and using body scanning. She continues to feel a bit foggy in the afternoons.     3/3/23  Reports doing well. States she has been in communication with MD about headaches. Has noticed on days when she has lower dose of med she has more anxiety. She has been examining her anxiety when going out socially. Does not feel it is associated with the socializing itself but rather \"will the anxiety come up.\" Discussed upcoming appt for neuro psych testing. Is hesitant but willing to do testing. May want to talk to MD about it prior appt. Fearful about Drug Induced Parkinson's.       2/17/23  Pt reports she is doing well. Reports anxiety has been better. Able to control it better with accepting and reognition of anxiety. Continues to utilize meditation and review of skills taught in therapy. Ice packs has been helping with headaches. Self " "talk has much improved and has gained more confidence. Anxiety in the evening have improved but still feeling the anxiety at times. She has been wanting to work exercise into her daily routine. Sleep has been also improve. ID values: umm, family, self respect responsibility and kindness.       2/10/23  Pt reports she had such a good time with her trip to her sisters house. Much reduced anxious than expected (compared to home) and sleep was really good. Discussed implementing things that worked at [employer] for home. Continues to have some anxiety when going into store or before events. Reports panic attacks are less than previous week. Discussed triggers for anxiety. Discussed do expectations of self meet values.  ID values from next time and look at expectations for self. Pt asked \"What could I do better to be good enough\". Struggles with being good enough for self. Talked about list of things she wants to achieve in order to feel \"good enough\". One being able to go to places with out wondering if she will be anxious.     Identified Triggers for anxiety and background thought behind it:  More anxious when tired: less bandwidth to utilize skills  Feeling going to be late  Making quick decisions  Technology   organizing big meal for company: what people expect of her  MD visits or waiting for visit: anticipations of events at MD, has not had poor experiences, husbands medical experience   What if something happens to Zach   Packing groceries and people are behind her  Sometimes going out to eat (much better): menu with too many options and having to make decisions  Holiday: anticipation and wanting it to be perfect for people/what ng people expect of her  Dealing with insurance problem: hassle and being out of her control   Being in the unknown about things: Slowness of recovery from anxiety, what ifs  Do people see I am anxious  Large crowd of people: leading up to and during (highest), people might be noticing " her, dont want to stand   *Things that need to be done but are not done(floor needing to be washed, getting decorations down etc): likes list to check off (what to accomplish that week), Not accomplishing goal. Self placed expectations (high standards from growing up), perfectionism (mom perfectionism as a child), some self comparisons to others, taking on too much during a week, and expecting to be like old self all the time (compare self to old self).     2/1/23  Pt is reporting she is doing well. She has been out driving to do shopping and seeing friends. Continues to have improvement with anxiety each day. Continues to have headaches but is wanting to change medications until she gets back from trip to her sisters home. Having some anxiety about spending over night for the first time since COVID. Discussed steps to addressing anxiety at her sisters home and drive up. 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 Became dizzy and become worried about the physical sx she was experiencing. Anxiety grew from that point on. Discussed how she handled it in the moment with redirecting thoughts, accepting anxiety and attempting some of the anxiety tech discussed. Pt will explore other situations in which anxiety tends to arise for next session. Pt has been keeping a journal of her daily experience and will reexamine journal to better understand anxiety. Pt listed several situations she knows brings anxiety.      Identified Triggers:  Feeling rushed  Not wanting to be late  Having others do responsibilities she once carried as her responsibility  Being flexible  Expectations of others  What do others see/think of her when experiencing panic attack    1/23/23  Reports she is doing really well. Reports she will be driving up to see her grandson. She is highly looking forward to this and making [employer] cards. Some anxiety with going in to the grocery store but not once she gets started  shopping. Reports her ultimate goal would be to go to the mall alone with traffic as that has been causing anxiety. Headaches continue to be of concern to her. Discussed further and identified them coming following either a panic attack or after high anxiety. Discussed using sensory tech for elevating the anxiety and also for following headache such as heating pad for neck or eyes. Discussed her conversation with Dr. Pompa about possibly changing meds to help with headaches. She reports relishing in current progress and is not wanting meds to change at this time. Headaches do interfere sometimes with daily life. She will be going to lunch with several college friends. This has caused a bit of anxiety in accepting the invite. Normalized experience for pt.       Progress on Treatment Objective(s) / Homework:  Satisfactory progress - ACTION (Actively working towards change); Intervened by reinforcing change plan / affirming steps taken    Motivational Interviewing    MI Intervention: Expressed Empathy/Understanding, Supported Autonomy, Collaboration, Evocation, Open-ended questions, Change talk (evoked) and Reframe     Change Talk Expressed by the Patient: Desire to change Ability to change Reasons to change Need to change    Provider Response to Change Talk: E - Evoked more info from patient about behavior change, A - Affirmed patient's thoughts, decisions, or attempts at behavior change, R - Reflected patient's change talk and S - Summarized patient's change talk statements      Care Plan review completed: Yes    Medication Review:  No changes to current psychiatric medication(s)    Medication Compliance:  Yes    Changes in Health Issues:   None reported    Chemical Use Review:   Substance Use: Chemical use reviewed, no active concerns identified      Tobacco Use: No current tobacco use.      Assessment: Current Emotional / Mental Status (status of significant symptoms):  Risk status (Self / Other harm or suicidal  ideation)  Patient denies a history of suicidal ideation, suicide attempts, self-injurious behavior, homicidal ideation, homicidal behavior and and other safety concerns  Patient denies current fears or concerns for personal safety.  Patient denies current or recent suicidal ideation or behaviors.  Patient denies current or recent homicidal ideation or behaviors.  Patient denies current or recent self injurious behavior or ideation.  Patient denies other safety concerns.  A safety and risk management plan has not been developed at this time, however patient was encouraged to call [place] [phone] should there be a change in any of these risk factors.    Appearance:   Appropriate   Eye Contact:   Good   Psychomotor Behavior: Normal   Attitude:   Cooperative   Orientation:   All  Speech   Rate / Production: Normal    Volume:  Normal   Mood:    Anxious   Affect:    Appropriate   Thought Content:  Clear   Thought Form:  Coherent  Logical   Insight:    Good     Diagnoses:  1. Generalized anxiety disorder    2. Insomnia, unspecified type    3. Mood disorder (H)        Collateral Reports Completed:  Not Applicable    Plan: (Homework, other):  Patient was given information about behavioral services and encouraged to schedule a follow up appointment with the clinic [facility] in 1 week.  She was also given information about mental health symptoms and treatment options .  CD Recommendations: No indications of CD issues.  Erika Francisco [facility]      ______________________________________________________________________    Integrated Primary Care Behavioral Health Treatment Plan    Patient's Name: Jolynn Haji  YOB: 1955    Date of Creation: 10/5/22  Date Treatment Plan Last Reviewed/Revised: 1/13/23    DSM5 Diagnoses: 300.02 (F41.1) Generalized Anxiety Disorder  Psychosocial / Contextual Factors: Individual Factors high anxiety associated with depressive sx, interfereing with ability to function as she would like.   .  PROMIS (reviewed every 90 days):     Referral / Collaboration:  Was/were discussed and patient will pursue. - individual therapy    Anticipated number of session for this episode of care: 5-8 sessions  Anticipation frequency of session: Weekly  Anticipated Duration of each session: 38-52 minutes  Treatment plan will be reviewed in 90 days or when goals have been changed.       MeasurableTreatment Goal(s) related to diagnosis / functional impairment(s)  Goal 1: Patient will work with provider to manage symptoms    I will know I've met my goal when when less anxious and feeling down.      Objective #A (Patient Action)    Patient will  attend all sessions.  Status: Continued - Date(s):1/13/23     Intervention(s)  Therapist will teach educate patient on treatment options, clarify concerns, work with pt to overcome any resistance to compliance..    Objective #B  Patient will identify 3 fears / thoughts that contribute to feeling anxious.  Status: Continued - Date(s):1/13/23     Intervention(s)  Therapist will educate patient on treatment options, clarify concerns, work with pt to overcome any resistance to compliance..    Objective #C  Patient will identify 3 initial signs or symptoms of anxiety.  Status: Continued - Date(s):1/13/23     Intervention(s)  Therapist will educate patient on treatment options, clarify concerns, work with pt to overcome any resistance to compliance..        Patient has reviewed and agreed to the above plan.      DINESH Whipple  March 31, 2023  Answers for HPI/ROS submitted by the patient on 12/6/2022  TIFFANY 7 TOTAL SCORE: 3  Answers for HPI/ROS submitted by the patient on 3/8/2023  If you checked off any problems, how difficult have these problems made it for you to do your work, take care of things at home, or get along with other people?: Not difficult at all  PHQ9 TOTAL SCORE: 5

## 2023-03-31 ENCOUNTER — VIRTUAL VISIT (OUTPATIENT)
Dept: BEHAVIORAL HEALTH | Facility: CLINIC | Age: 68
End: 2023-03-31
Payer: COMMERCIAL

## 2023-03-31 DIAGNOSIS — F41.1 GENERALIZED ANXIETY DISORDER: Primary | ICD-10-CM

## 2023-03-31 DIAGNOSIS — F39 MOOD DISORDER (H): ICD-10-CM

## 2023-03-31 DIAGNOSIS — G47.00 INSOMNIA, UNSPECIFIED TYPE: ICD-10-CM

## 2023-03-31 PROCEDURE — 90834 PSYTX W PT 45 MINUTES: CPT | Mod: VID | Performed by: SOCIAL WORKER

## 2023-04-04 ENCOUNTER — ANCILLARY PROCEDURE (OUTPATIENT)
Dept: BONE DENSITY | Facility: CLINIC | Age: 68
End: 2023-04-04
Payer: COMMERCIAL

## 2023-04-04 DIAGNOSIS — Z78.0 ASYMPTOMATIC POSTMENOPAUSAL STATUS: ICD-10-CM

## 2023-04-04 PROCEDURE — 77080 DXA BONE DENSITY AXIAL: CPT | Performed by: INTERNAL MEDICINE

## 2023-04-12 ENCOUNTER — VIRTUAL VISIT (OUTPATIENT)
Dept: BEHAVIORAL HEALTH | Facility: CLINIC | Age: 68
End: 2023-04-12
Payer: COMMERCIAL

## 2023-04-12 DIAGNOSIS — G47.00 INSOMNIA, UNSPECIFIED TYPE: ICD-10-CM

## 2023-04-12 DIAGNOSIS — F41.1 GENERALIZED ANXIETY DISORDER: Primary | ICD-10-CM

## 2023-04-12 PROCEDURE — 90837 PSYTX W PT 60 MINUTES: CPT | Mod: VID | Performed by: SOCIAL WORKER

## 2023-04-12 NOTE — PROGRESS NOTES
[facility] Integrated Behavioral Health  April 12, 2023      Behavioral Health Clinician Progress Note    Patient Name: Jolynn Haji           Service Type:  Individual      Service Location:   MyChart / Email (patient reached)     Session Start Time: 11:00 am  Session End Time: 11:53 am      Session Length: 53 - 60      Attendees: Patient     Service Modality:  Video Visit:      Provider verified identity through the following two step process.  Patient provided:  Patient is known previously to provider    Telemedicine Visit: The patient's condition can be safely assessed and treated via synchronous audio and visual telemedicine encounter.      Reason for Telemedicine Visit: Patient has requested telehealth visit    Originating Site (Patient Location): Patient's home    Distant Site (Provider Location): [facility]    Consent:  The patient/guardian has verbally consented to: the potential risks and benefits of telemedicine (video visit) versus in person care; bill my insurance or make self-payment for services provided; and responsibility for payment of non-covered services.     Patient would like the video invitation sent by:  My Chart    Mode of Communication:  Video Conference via [employer]    As the provider I attest to compliance with applicable laws and regulations related to telemedicine.    Visit Activities (Refresh list every visit): [facility] Only    Diagnostic Assessment Date: 9/27/22  Treatment Plan Review Date: 4/13/23  See Flowsheets for today's PHQ-9 and TIFFANY-7 results  Previous PHQ-9:       3/8/2023     4:27 PM 3/21/2023    11:20 AM 3/23/2023     5:30 PM   PHQ-9 SCORE   PHQ-9 Total Score MyChart 5 (Mild depression) 1 (Minimal depression) 1 (Minimal depression)   PHQ-9 Total Score 5 1 1     Previous TIFFANY-7:       2/23/2023     7:09 PM 3/21/2023    11:20 AM 3/23/2023     5:33 PM   TIFFANY-7 SCORE   Total Score 4 (minimal anxiety) 1 (minimal anxiety) 5  "(mild anxiety)   Total Score 4 1 5       KARINA LEVEL:      8/27/2010     3:00 PM 2/22/2011    11:00 AM   KARINA Score (Last Two)   KARINA Raw Score 51 49   Activation Score 91.6 82.8   KARINA Level 4 4       DATA  Extended Session (60+ minutes): No  Interactive Complexity: No  Crisis: No  [facility] Patient: No    Treatment Objective(s) Addressed in This Session:  Target Behavior(s): anxiety and depression    Depressed Mood: Increase interest, engagement, and pleasure in doing things  Decrease frequency and intensity of feeling down, depressed, hopeless  Improve quantity and quality of night time sleep / decrease daytime naps  Feel less tired and more energy during the day   Improve diet, appetite, mindful eating, and / or meal planning  Identify negative self-talk and behaviors: challenge core beliefs, myths, and actions  Improve concentration, focus, and mindfulness in daily activities   Feel less fidgety, restless or slow in daily activities / interpersonal interactions  Anxiety: will experience a reduction in anxiety, will develop more effective coping skills to manage anxiety symptoms, will develop healthy cognitive patterns and beliefs and will increase ability to function adaptively    Current Stressors / Issues:  Reports it she is doing well and had a great Easter with family. Discussed more about her anxiety (ABC), being self blaming (what ifs/should have) at nature but at other times unknown source. Discussed eliminating \"should' for vocabulary due to shame, self judgement, and regret associated with the word. She will continue to work on ABC and look at the use of should.     3/31/23  Reports she is doing well. Anxiety has been pretty good but has headaches when anxious. Looking forward to going to theater with grandchildren. Medications have been very helpful. Discussed ABC of Anxiety. Discussed events of anxiety at its worse. Discussed the beliefs she has about anxiety events. Discussed consequences of event.     3/23/23 " "[facility] update: Reports anxiety has decreased a lot on the propanol. Continues to have headaches but only about half the days. She has noticed a stronger correlation with headaches and anxiety. She completed Neuro Psych testing. Relieved she has no new dx. Planning on walking with dog. She is feeling more like her self, \"5 star days.\"    Stresses: Neuro psych testing and going to wake alone was stressful. Felt she managed very well.   Appetite: unremarkable  Sleep: unremarkable  Therapy: Continues to do weekly therapy. [place] she worried a lot about testing and did not , lesson in not worrying about things that have not happened yet. Feeling very accomplished after attending a wake alone without her . She stated it went very well and anxiety was lower than normal and then expected. Discussed decision making in the moment. Discussed it can be difficult to make decisions in the moment. Continues with coping skills, ie meditation and breathing tch.   FARIDA: na  Preg: na  Most important: Wants to address doses of medications.     3/14/23  Feels the propanol has been really helping with anxiety. She has been feeling like her self for the past 5 days. Continues to have several headaches. She is considering going back to yoga after looking as some neck stretches.     3/9/23 ([facility])  She has been more anxious on the different dosing of medications. She has also not noticed decrease in headaches. Discussed neck tension and using body scanning. She continues to feel a bit foggy in the afternoons.     3/3/23  Reports doing well. States she has been in communication with MD about headaches. Has noticed on days when she has lower dose of med she has more anxiety. She has been examining her anxiety when going out socially. Does not feel it is associated with the socializing itself but rather \"will the anxiety come up.\" Discussed upcoming appt for neuro psych testing. Is hesitant but willing to do testing. May want to talk " "to MD about it prior appt. Fearful about Drug Induced Parkinson's.       2/17/23  Pt reports she is doing well. Reports anxiety has been better. Able to control it better with accepting and reognition of anxiety. Continues to utilize meditation and review of skills taught in therapy. Ice packs has been helping with headaches. Self talk has much improved and has gained more confidence. Anxiety in the evening have improved but still feeling the anxiety at times. She has been wanting to work exercise into her daily routine. Sleep has been also improve. ID values: umm, family, self respect responsibility and kindness.       2/10/23  Pt reports she had such a good time with her trip to her sisters house. Much reduced anxious than expected (compared to home) and sleep was really good. Discussed implementing things that worked at [employer] for home. Continues to have some anxiety when going into store or before events. Reports panic attacks are less than previous week. Discussed triggers for anxiety. Discussed do expectations of self meet values.  ID values from next time and look at expectations for self. Pt asked \"What could I do better to be good enough\". Struggles with being good enough for self. Talked about list of things she wants to achieve in order to feel \"good enough\". One being able to go to places with out wondering if she will be anxious.     Identified Triggers for anxiety and background thought behind it:  More anxious when tired: less bandwidth to utilize skills  Feeling going to be late  Making quick decisions  Technology   organizing big meal for company: what people expect of her  MD visits or waiting for visit: anticipations of events at MD, has not had poor experiences, husbands medical experience   What if something happens to Zach   Packing groceries and people are behind her  Sometimes going out to eat (much better): menu with too many options and having to make decisions  Holiday: anticipation " and wanting it to be perfect for people/what ng people expect of her  Dealing with insurance problem: hassle and being out of her control   Being in the unknown about things: Slowness of recovery from anxiety, what ifs  Do people see I am anxious  Large crowd of people: leading up to and during (highest), people might be noticing her, dont want to stand   *Things that need to be done but are not done(floor needing to be washed, getting decorations down etc): likes list to check off (what to accomplish that week), Not accomplishing goal. Self placed expectations (high standards from growing up), perfectionism (mom perfectionism as a child), some self comparisons to others, taking on too much during a week, and expecting to be like old self all the time (compare self to old self).     2/1/23  Pt is reporting she is doing well. She has been out driving to do shopping and seeing friends. Continues to have improvement with anxiety each day. Continues to have headaches but is wanting to change medications until she gets back from trip to her sisters home. Having some anxiety about spending over night for the first time since COVID. Discussed steps to addressing anxiety at her sisters home and drive up. 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 Became dizzy and become worried about the physical sx she was experiencing. Anxiety grew from that point on. Discussed how she handled it in the moment with redirecting thoughts, accepting anxiety and attempting some of the anxiety tech discussed. Pt will explore other situations in which anxiety tends to arise for next session. Pt has been keeping a journal of her daily experience and will reexamine journal to better understand anxiety. Pt listed several situations she knows brings anxiety.      Identified Triggers:  Feeling rushed  Not wanting to be late  Having others do responsibilities she once carried as her responsibility  Being  flexible  Expectations of others  What do others see/think of her when experiencing panic attack    1/23/23  Reports she is doing really well. Reports she will be driving up to see her grandson. She is highly looking forward to this and making valentines cards. Some anxiety with going in to the grocery store but not once she gets started shopping. Reports her ultimate goal would be to go to the mall alone with traffic as that has been causing anxiety. Headaches continue to be of concern to her. Discussed further and identified them coming following either a panic attack or after high anxiety. Discussed using sensory tech for elevating the anxiety and also for following headache such as heating pad for neck or eyes. Discussed her conversation with Dr. Pompa about possibly changing meds to help with headaches. She reports relishing in current progress and is not wanting meds to change at this time. Headaches do interfere sometimes with daily life. She will be going to lunch with several college friends. This has caused a bit of anxiety in accepting the invite. Normalized experience for pt.       Progress on Treatment Objective(s) / Homework:  Satisfactory progress - ACTION (Actively working towards change); Intervened by reinforcing change plan / affirming steps taken    Motivational Interviewing    MI Intervention: Expressed Empathy/Understanding, Supported Autonomy, Collaboration, Evocation, Open-ended questions, Change talk (evoked) and Reframe     Change Talk Expressed by the Patient: Desire to change Ability to change Reasons to change Need to change    Provider Response to Change Talk: E - Evoked more info from patient about behavior change, A - Affirmed patient's thoughts, decisions, or attempts at behavior change, R - Reflected patient's change talk and S - Summarized patient's change talk statements      Care Plan review completed: Yes    Medication Review:  No changes to current psychiatric  medication(s)    Medication Compliance:  Yes    Changes in Health Issues:   None reported    Chemical Use Review:   Substance Use: Chemical use reviewed, no active concerns identified      Tobacco Use: No current tobacco use.      Assessment: Current Emotional / Mental Status (status of significant symptoms):  Risk status (Self / Other harm or suicidal ideation)  Patient denies a history of suicidal ideation, suicide attempts, self-injurious behavior, homicidal ideation, homicidal behavior and and other safety concerns  Patient denies current fears or concerns for personal safety.  Patient denies current or recent suicidal ideation or behaviors.  Patient denies current or recent homicidal ideation or behaviors.  Patient denies current or recent self injurious behavior or ideation.  Patient denies other safety concerns.  A safety and risk management plan has not been developed at this time, however patient was encouraged to call [place] [phone] should there be a change in any of these risk factors.    Appearance:   Appropriate   Eye Contact:   Good   Psychomotor Behavior: Normal   Attitude:   Cooperative   Orientation:   All  Speech   Rate / Production: Normal    Volume:  Normal   Mood:    Anxious   Affect:    Appropriate   Thought Content:  Clear   Thought Form:  Coherent  Logical   Insight:    Good     Diagnoses:  1. Generalized anxiety disorder    2. Insomnia, unspecified type        Collateral Reports Completed:  Not Applicable    Plan: (Homework, other):  Patient was given information about behavioral services and encouraged to schedule a follow up appointment with the clinic [facility] in 1 week.  She was also given information about mental health symptoms and treatment options .  CD Recommendations: No indications of CD issues.  Erika Francisco [facility] LICSW      ______________________________________________________________________    Integrated Primary Care Behavioral Health Treatment Plan    Patient's Name: Jolynn RESTREPO  Adithya  YOB: 1955    Date of Creation: 10/5/22  Date Treatment Plan Last Reviewed/Revised: 1/13/23    DSM5 Diagnoses: 300.02 (F41.1) Generalized Anxiety Disorder  Psychosocial / Contextual Factors: Individual Factors high anxiety associated with depressive sx, interfereing with ability to function as she would like.  .  PROMIS (reviewed every 90 days):     Referral / Collaboration:  Was/were discussed and patient will pursue. - individual therapy    Anticipated number of session for this episode of care: 5-8 sessions  Anticipation frequency of session: Weekly  Anticipated Duration of each session: 38-52 minutes  Treatment plan will be reviewed in 90 days or when goals have been changed.       MeasurableTreatment Goal(s) related to diagnosis / functional impairment(s)  Goal 1: Patient will work with provider to manage symptoms    I will know I've met my goal when when less anxious and feeling down.      Objective #A (Patient Action)    Patient will  attend all sessions.  Status: Continued - Date(s):1/13/23     Intervention(s)  Therapist will teach educate patient on treatment options, clarify concerns, work with pt to overcome any resistance to compliance..    Objective #B  Patient will identify 3 fears / thoughts that contribute to feeling anxious.  Status: Continued - Date(s):1/13/23     Intervention(s)  Therapist will educate patient on treatment options, clarify concerns, work with pt to overcome any resistance to compliance..    Objective #C  Patient will identify 3 initial signs or symptoms of anxiety.  Status: Continued - Date(s):1/13/23     Intervention(s)  Therapist will educate patient on treatment options, clarify concerns, work with pt to overcome any resistance to compliance..        Patient has reviewed and agreed to the above plan.      Erika Francisco [facility]  April 12, 2023  Answers for HPI/ROS submitted by the patient on 12/6/2022  TIFFANY 7 TOTAL SCORE: 3  Answers for HPI/ROS submitted by the  patient on 3/8/2023  If you checked off any problems, how difficult have these problems made it for you to do your work, take care of things at home, or get along with other people?: Not difficult at all  PHQ9 TOTAL SCORE: 5

## 2023-04-19 ENCOUNTER — VIRTUAL VISIT (OUTPATIENT)
Dept: BEHAVIORAL HEALTH | Facility: CLINIC | Age: 68
End: 2023-04-19
Payer: COMMERCIAL

## 2023-04-19 DIAGNOSIS — G47.00 INSOMNIA, UNSPECIFIED TYPE: ICD-10-CM

## 2023-04-19 DIAGNOSIS — F41.1 GENERALIZED ANXIETY DISORDER: Primary | ICD-10-CM

## 2023-04-19 PROCEDURE — 90834 PSYTX W PT 45 MINUTES: CPT | Mod: VID | Performed by: SOCIAL WORKER

## 2023-04-19 NOTE — PROGRESS NOTES
[facility] Integrated Behavioral Health  April 19, 2023      Behavioral Health Clinician Progress Note    Patient Name: Jolynn Haji           Service Type:  Individual      Service Location:   MyChart / Email (patient reached)     Session Start Time: 11:00 am  Session End Time: 11:38 am      Session Length: 38 - 52      Attendees: Patient     Service Modality:  Video Visit:      Provider verified identity through the following two step process.  Patient provided:  Patient is known previously to provider    Telemedicine Visit: The patient's condition can be safely assessed and treated via synchronous audio and visual telemedicine encounter.      Reason for Telemedicine Visit: Patient has requested telehealth visit    Originating Site (Patient Location): Patient's home    Distant Site (Provider Location): [facility]    Consent:  The patient/guardian has verbally consented to: the potential risks and benefits of telemedicine (video visit) versus in person care; bill my insurance or make self-payment for services provided; and responsibility for payment of non-covered services.     Patient would like the video invitation sent by:  My Chart    Mode of Communication:  Video Conference via [employer]    As the provider I attest to compliance with applicable laws and regulations related to telemedicine.    Visit Activities (Refresh list every visit): [facility] [place] Only    Diagnostic Assessment Date: 9/27/22  Treatment Plan Review Date: 4/13/23  See Flowsheets for today's PHQ-9 and TIFFANY-7 results  Previous PHQ-9:       3/8/2023     4:27 PM 3/21/2023    11:20 AM 3/23/2023     5:30 PM   PHQ-9 SCORE   PHQ-9 Total Score MyChart 5 (Mild depression) 1 (Minimal depression) 1 (Minimal depression)   PHQ-9 Total Score 5 1 1     Previous TIFFANY-7:       2/23/2023     7:09 PM 3/21/2023    11:20 AM 3/23/2023     5:33 PM   TIFFANY-7 SCORE   Total Score 4 (minimal anxiety) 1 (minimal anxiety) 5  "(mild anxiety)   Total Score 4 1 5       KARINA LEVEL:      8/27/2010     3:00 PM 2/22/2011    11:00 AM   KARINA Score (Last Two)   KARINA Raw Score 51 49   Activation Score 91.6 82.8   KARINA Level 4 4       DATA  Extended Session (60+ minutes): No  Interactive Complexity: No  Crisis: No  [facility] Patient: No    Treatment Objective(s) Addressed in This Session:  Target Behavior(s): anxiety and depression    Depressed Mood: Increase interest, engagement, and pleasure in doing things  Decrease frequency and intensity of feeling down, depressed, hopeless  Improve quantity and quality of night time sleep / decrease daytime naps  Feel less tired and more energy during the day   Improve diet, appetite, mindful eating, and / or meal planning  Identify negative self-talk and behaviors: challenge core beliefs, myths, and actions  Improve concentration, focus, and mindfulness in daily activities   Feel less fidgety, restless or slow in daily activities / interpersonal interactions  Anxiety: will experience a reduction in anxiety, will develop more effective coping skills to manage anxiety symptoms, will develop healthy cognitive patterns and beliefs and will increase ability to function adaptively    Current Stressors / Issues:  Reports managing her anxiety around renovation of [place] bathroom has been difficult. Discussed desire for control and letting go.Reports sister is coming for several days.  is having surgery tomorrow. She has been walking and exercising more.      4/12/23  Reports it she is doing well and had a great Easter with family. Discussed more about her anxiety (ABC), being self blaming (what ifs/should have) at nature but at other times unknown source. Discussed eliminating \"should' for vocabulary due to shame, self judgement, and regret associated with the word. She will continue to work on ABC and look at the use of should.     3/31/23  Reports she is doing well. Anxiety has been pretty good but has headaches " "when anxious. Looking forward to going to theater with grandchildren. Medications have been very helpful. Discussed ABC of Anxiety. Discussed events of anxiety at its worse. Discussed the beliefs she has about anxiety events. Discussed consequences of event.     3/23/23 [facility]  MH update: Reports anxiety has decreased a lot on the propanol. Continues to have headaches but only about half the days. She has noticed a stronger correlation with headaches and anxiety. She completed Neuro Psych testing. Relieved she has no new dx. Planning on walking with dog. She is feeling more like her self, \"5 star days.\"    Stresses: Neuro psych testing and going to wake alone was stressful. Felt she managed very well.   Appetite: unremarkable  Sleep: unremarkable  Therapy: Continues to do weekly therapy. [place] she worried a lot about testing and did not , lesson in not worrying about things that have not happened yet. Feeling very accomplished after attending a wake alone without her . She stated it went very well and anxiety was lower than normal and then expected. Discussed decision making in the moment. Discussed it can be difficult to make decisions in the moment. Continues with coping skills, ie meditation and breathing tch.   FARIDA: na  Preg: na  Most important: Wants to address doses of medications.     3/14/23  Feels the propanol has been really helping with anxiety. She has been feeling like her self for the past 5 days. Continues to have several headaches. She is considering going back to yoga after looking as some neck stretches.     3/9/23 ([facility])  She has been more anxious on the different dosing of medications. She has also not noticed decrease in headaches. Discussed neck tension and using body scanning. She continues to feel a bit foggy in the afternoons.     3/3/23  Reports doing well. States she has been in communication with MD about headaches. Has noticed on days when she has lower dose of med she has more " "anxiety. She has been examining her anxiety when going out socially. Does not feel it is associated with the socializing itself but rather \"will the anxiety come up.\" Discussed upcoming appt for neuro psych testing. Is hesitant but willing to do testing. May want to talk to MD about it prior appt. Fearful about Drug Induced Parkinson's.       2/17/23  Pt reports she is doing well. Reports anxiety has been better. Able to control it better with accepting and reognition of anxiety. Continues to utilize meditation and review of skills taught in therapy. Ice packs has been helping with headaches. Self talk has much improved and has gained more confidence. Anxiety in the evening have improved but still feeling the anxiety at times. She has been wanting to work exercise into her daily routine. Sleep has been also improve. ID values: umm, family, self respect responsibility and kindness.       2/10/23  Pt reports she had such a good time with her trip to her sisters house. Much reduced anxious than expected (compared to home) and sleep was really good. Discussed implementing things that worked at [employer] for home. Continues to have some anxiety when going into store or before events. Reports panic attacks are less than previous week. Discussed triggers for anxiety. Discussed do expectations of self meet values.  ID values from next time and look at expectations for self. Pt asked \"What could I do better to be good enough\". Struggles with being good enough for self. Talked about list of things she wants to achieve in order to feel \"good enough\". One being able to go to places with out wondering if she will be anxious.     Identified Triggers for anxiety and background thought behind it:  More anxious when tired: less bandwidth to utilize skills  Feeling going to be late  Making quick decisions  Technology   organizing big [employer] for company: what people expect of her  MD visits or waiting for visit: anticipations of events " at MD, has not had poor experiences, husbands medical experience   What if something happens to Zach   Packing groceries and people are behind her  Sometimes going out to eat (much better): menu with too many options and having to make decisions  Holiday: anticipation and wanting it to be perfect for people/what ng people expect of her  Dealing with insurance problem: hassle and being out of her control   Being in the unknown about things: Slowness of recovery from anxiety, what ifs  Do people see I am anxious  Large crowd of people: leading up to and during (highest), people might be noticing her, dont want to stand   *Things that need to be done but are not done(floor needing to be washed, getting decorations down etc): likes list to check off (what to accomplish that week), Not accomplishing goal. Self placed expectations (high standards from growing up), perfectionism (mom perfectionism as a child), some self comparisons to others, taking on too much during a week, and expecting to be like old self all the time (compare self to old self).     2/1/23  Pt is reporting she is doing well. She has been out driving to do shopping and seeing friends. Continues to have improvement with anxiety each day. Continues to have headaches but is wanting to change medications until she gets back from trip to her sisters home. Having some anxiety about spending over night for the first time since COVID. Discussed steps to addressing anxiety at her sisters home and drive up. 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 Became dizzy and become worried about the physical sx she was experiencing. Anxiety grew from that point on. Discussed how she handled it in the moment with redirecting thoughts, accepting anxiety and attempting some of the anxiety tech discussed. Pt will explore other situations in which anxiety tends to arise for next session. Pt has been keeping a journal of her daily experience  and will reexamine journal to better understand anxiety. Pt listed several situations she knows brings anxiety.      Identified Triggers:  Feeling rushed  Not wanting to be late  Having others do responsibilities she once carried as her responsibility  Being flexible  Expectations of others  What do others see/think of her when experiencing panic attack    1/23/23  Reports she is doing really well. Reports she will be driving up to see her grandson. She is highly looking forward to this and making valentines cards. Some anxiety with going in to the grocery store but not once she gets started shopping. Reports her ultimate goal would be to go to the mall alone with traffic as that has been causing anxiety. Headaches continue to be of concern to her. Discussed further and identified them coming following either a panic attack or after high anxiety. Discussed using sensory tech for elevating the anxiety and also for following headache such as heating pad for neck or eyes. Discussed her conversation with Dr. Pompa about possibly changing meds to help with headaches. She reports relishing in current progress and is not wanting meds to change at this time. Headaches do interfere sometimes with daily life. She will be going to lunch with several college friends. This has caused a bit of anxiety in accepting the invite. Normalized experience for pt.       Progress on Treatment Objective(s) / Homework:  Satisfactory progress - ACTION (Actively working towards change); Intervened by reinforcing change plan / affirming steps taken    Motivational Interviewing    MI Intervention: Expressed Empathy/Understanding, Supported Autonomy, Collaboration, Evocation, Open-ended questions, Change talk (evoked) and Reframe     Change Talk Expressed by the Patient: Desire to change Ability to change Reasons to change Need to change    Provider Response to Change Talk: E - Evoked more info from patient about behavior change, A - Affirmed  patient's thoughts, decisions, or attempts at behavior change, R - Reflected patient's change talk and S - Summarized patient's change talk statements      Care Plan review completed: Yes    Medication Review:  No changes to current psychiatric medication(s)    Medication Compliance:  Yes    Changes in Health Issues:   None reported    Chemical Use Review:   Substance Use: Chemical use reviewed, no active concerns identified      Tobacco Use: No current tobacco use.      Assessment: Current Emotional / Mental Status (status of significant symptoms):  Risk status (Self / Other harm or suicidal ideation)  Patient denies a history of suicidal ideation, suicide attempts, self-injurious behavior, homicidal ideation, homicidal behavior and and other safety concerns  Patient denies current fears or concerns for personal safety.  Patient denies current or recent suicidal ideation or behaviors.  Patient denies current or recent homicidal ideation or behaviors.  Patient denies current or recent self injurious behavior or ideation.  Patient denies other safety concerns.  A safety and risk management plan has not been developed at this time, however patient was encouraged to call [place] [phone] should there be a change in any of these risk factors.    Appearance:   Appropriate   Eye Contact:   Good   Psychomotor Behavior: Normal   Attitude:   Cooperative   Orientation:   All  Speech   Rate / Production: Normal    Volume:  Normal   Mood:    Anxious   Affect:    Appropriate   Thought Content:  Clear   Thought Form:  Coherent  Logical   Insight:    Good     Diagnoses:  1. Generalized anxiety disorder    2. Insomnia, unspecified type        Collateral Reports Completed:  Not Applicable    Plan: (Homework, other):  Patient was given information about behavioral services and encouraged to schedule a follow up appointment with the clinic [facility] in 1 week.  She was also given information about mental health symptoms and treatment  options .  CD Recommendations: No indications of CD issues.  Erika Francisco MSW LICSW      ______________________________________________________________________    Integrated Primary Care Behavioral Health Treatment Plan    Patient's Name: Jolynn Haji  YOB: 1955    Date of Creation: 10/5/22  Date Treatment Plan Last Reviewed/Revised: 1/13/23    DSM5 Diagnoses: 300.02 (F41.1) Generalized Anxiety Disorder  Psychosocial / Contextual Factors: Individual Factors high anxiety associated with depressive sx, interfereing with ability to function as she would like.  .  PROMIS (reviewed every 90 days):     Referral / Collaboration:  Was/were discussed and patient will pursue. - individual therapy    Anticipated number of session for this episode of care: 5-8 sessions  Anticipation frequency of session: Weekly  Anticipated Duration of each session: 38-52 minutes  Treatment plan will be reviewed in 90 days or when goals have been changed.       MeasurableTreatment Goal(s) related to diagnosis / functional impairment(s)  Goal 1: Patient will work with provider to manage symptoms    I will know I've met my goal when when less anxious and feeling down.      Objective #A (Patient Action)    Patient will  attend all sessions.  Status: Continued - Date(s):1/13/23     Intervention(s)  Therapist will teach educate patient on treatment options, clarify concerns, work with pt to overcome any resistance to compliance..    Objective #B  Patient will identify 3 fears / thoughts that contribute to feeling anxious.  Status: Continued - Date(s):1/13/23     Intervention(s)  Therapist will educate patient on treatment options, clarify concerns, work with pt to overcome any resistance to compliance..    Objective #C  Patient will identify 3 initial signs or symptoms of anxiety.  Status: Continued - Date(s):1/13/23     Intervention(s)  Therapist will educate patient on treatment options, clarify concerns, work with pt to overcome any  resistance to compliance..        Patient has reviewed and agreed to the above plan.      Erika Francisco, [facility]  April 19, 2023  Answers for HPI/ROS submitted by the patient on 12/6/2022  TIFFANY 7 TOTAL SCORE: 3  Answers for HPI/ROS submitted by the patient on 3/8/2023  If you checked off any problems, how difficult have these problems made it for you to do your work, take care of things at home, or get along with other people?: Not difficult at all  PHQ9 TOTAL SCORE: 5

## 2023-04-24 NOTE — PROGRESS NOTES
[facility] Psychiatry Services [facility]  April 27, 2023      Behavioral Health Clinician Progress Note    Patient Name: Jolynn Haji           Service Type:  Individual      Service Location:   MyChart / Email (patient reached)     Session Start Time: 10:00 am  Session End Time: 10:24 am      Session Length: 16 - 37      Attendees: Patient     Service Modality:  Video Visit:      Provider verified identity through the following two step process.  Patient provided:  Patient is known previously to provider    Telemedicine Visit: The patient's condition can be safely assessed and treated via synchronous audio and visual telemedicine encounter.      Reason for Telemedicine Visit: Patient has requested telehealth visit    Originating Site (Patient Location): Patient's home    Distant Site (Provider Location): [facility]    Consent:  The patient/guardian has verbally consented to: the potential risks and benefits of telemedicine (video visit) versus in person care; bill my insurance or make self-payment for services provided; and responsibility for payment of non-covered services.     Patient would like the video invitation sent by:  My Chart    Mode of Communication:  Video Conference via [facility]    As the provider I attest to compliance with applicable laws and regulations related to telemedicine.    Visit Activities (Refresh list every visit): [facility] Only    Diagnostic Assessment Date: 9/27/22  Treatment Plan Review Date: 7/27/23  See Flowsheets for today's PHQ-9 and TIFFANY-7 results  Previous PHQ-9:       3/21/2023    11:20 AM 3/23/2023     5:30 PM 4/26/2023    11:16 AM   PHQ-9 SCORE   PHQ-9 Total Score MyChart 1 (Minimal depression) 1 (Minimal depression) 1 (Minimal depression)   PHQ-9 Total Score 1 1 1     Previous TIFFANY-7:       2/23/2023     7:09 PM 3/21/2023    11:20 AM 3/23/2023     5:33 PM   TIFFANY-7 SCORE   Total Score 4 (minimal anxiety) 1 (minimal anxiety) 5 (mild  anxiety)   Total Score 4 1 5       KARINA LEVEL:      8/27/2010     3:00 PM 2/22/2011    11:00 AM   KARINA Score (Last Two)   KARINA Raw Score 51 49   Activation Score 91.6 82.8   KARINA Level 4 4       DATA  Extended Session (60+ minutes): No  Interactive Complexity: No  Crisis: No  [facility] Patient: No    Treatment Objective(s) Addressed in This Session:  Target Behavior(s): anxiety and depression    Depressed Mood: Increase interest, engagement, and pleasure in doing things  Decrease frequency and intensity of feeling down, depressed, hopeless  Improve quantity and quality of night time sleep / decrease daytime naps  Feel less tired and more energy during the day   Improve diet, appetite, mindful eating, and / or meal planning  Identify negative self-talk and behaviors: challenge core beliefs, myths, and actions  Improve concentration, focus, and mindfulness in daily activities   Feel less fidgety, restless or slow in daily activities / interpersonal interactions  Anxiety: will experience a reduction in anxiety, will develop more effective coping skills to manage anxiety symptoms, will develop healthy cognitive patterns and beliefs and will increase ability to function adaptively    Current Stressors / Issues:  MH update: doing well with heightened anxiety but not panic attacks.   Stresses: lake house renovations   Appetite: unremarkable  Sleep: unremarkable  Therapy: seeing therapist weekly, reports sister came for several days that was great. Making decision about traveling for family wedding.   FARIDA: na  Preg: na  Most important: continues to have headaches daily. Wants to discuss Ambien with MD/PCP suggested getting off completely. Would like to have Propanol for 10 mg to not have to cut.      4/19/23  Reports managing her anxiety around renovation of lake house bathroom has been difficult. Discussed desire for control and letting go.Reports sister is coming for several days.  is having surgery tomorrow. She has been  "walking and exercising more.      4/12/23  Reports it she is doing well and had a great Easter with family. Discussed more about her anxiety (ABC), being self blaming (what ifs/should have) at nature but at other times unknown source. Discussed eliminating \"should' for vocabulary due to shame, self judgement, and regret associated with the word. She will continue to work on ABC and look at the use of should.     3/31/23  Reports she is doing well. Anxiety has been pretty good but has headaches when anxious. Looking forward to going to theater with grandchildren. Medications have been very helpful. Discussed ABC of Anxiety. Discussed events of anxiety at its worse. Discussed the beliefs she has about anxiety events. Discussed consequences of event.     3/23/23 CCPS  MH update: Reports anxiety has decreased a lot on the propanol. Continues to have headaches but only about half the days. She has noticed a stronger correlation with headaches and anxiety. She completed Neuro Psych testing. Relieved she has no new dx. Planning on walking with dog. She is feeling more like her self, \"5 star days.\"    Stresses: Neuro psych testing and going to wake alone was stressful. Felt she managed very well.   Appetite: unremarkable  Sleep: unremarkable  Therapy: Continues to do weekly therapy. [place] she worried a lot about testing and did not , lesson in not worrying about things that have not happened yet. Feeling very accomplished after attending a wake alone without her . She stated it went very well and anxiety was lower than normal and then expected. Discussed decision making in the moment. Discussed it can be difficult to make decisions in the moment. Continues with coping skills, ie meditation and breathing tch.   FARIDA: na  Preg: na  Most important: Wants to address doses of medications.     3/14/23  Feels the propanol has been really helping with anxiety. She has been feeling like her self for the past 5 days. " "Continues to have several headaches. She is considering going back to yoga after looking as some neck stretches.     3/9/23 (CCPS)  She has been more anxious on the different dosing of medications. She has also not noticed decrease in headaches. Discussed neck tension and using body scanning. She continues to feel a bit foggy in the afternoons.     3/3/23  Reports doing well. States she has been in communication with MD about headaches. Has noticed on days when she has lower dose of med she has more anxiety. She has been examining her anxiety when going out socially. Does not feel it is associated with the socializing itself but rather \"will the anxiety come up.\" Discussed upcoming appt for neuro psych testing. Is hesitant but willing to do testing. May want to talk to MD about it prior appt. Fearful about Drug Induced Parkinson's.       2/17/23  Pt reports she is doing well. Reports anxiety has been better. Able to control it better with accepting and reognition of anxiety. Continues to utilize meditation and review of skills taught in therapy. Ice packs has been helping with headaches. Self talk has much improved and has gained more confidence. Anxiety in the evening have improved but still feeling the anxiety at times. She has been wanting to work exercise into her daily routine. Sleep has been also improve. ID values: umm, family, self respect responsibility and kindness.       2/10/23  Pt reports she had such a good time with her trip to her sisters house. Much reduced anxious than expected (compared to home) and sleep was really good. Discussed implementing things that worked at sisters for home. Continues to have some anxiety when going into store or before events. Reports panic attacks are less than previous week. Discussed triggers for anxiety. Discussed do expectations of self meet values.  ID values from next time and look at expectations for self. Pt asked \"What could I do better to be good enough\". " "Struggles with being good enough for self. Talked about list of things she wants to achieve in order to feel \"good enough\". One being able to go to places with out wondering if she will be anxious.     Identified Triggers for anxiety and background thought behind it:  More anxious when tired: less bandwidth to utilize skills  Feeling going to be late  Making quick decisions  Technology   organizing big meal for company: what people expect of her  MD visits or waiting for visit: anticipations of events at MD, has not had poor experiences, husbands medical experience   What if something happens to Zach   Packing groceries and people are behind her  Sometimes going out to eat (much better): menu with too many options and having to make decisions  Holiday: anticipation and wanting it to be perfect for people/what ng people expect of her  Dealing with insurance problem: hassle and being out of her control   Being in the unknown about things: Slowness of recovery from anxiety, what ifs  Do people see I am anxious  Large crowd of people: leading up to and during (highest), people might be noticing her, dont want to stand   *Things that need to be done but are not done(floor needing to be washed, getting decorations down etc): likes list to check off (what to accomplish that week), Not accomplishing goal. Self placed expectations (high standards from growing up), perfectionism (mom perfectionism as a child), some self comparisons to others, taking on too much during a week, and expecting to be like old self all the time (compare self to old self).     2/1/23  Pt is reporting she is doing well. She has been out driving to do shopping and seeing friends. Continues to have improvement with anxiety each day. Continues to have headaches but is wanting to change medications until she gets back from trip to her sisters home. Having some anxiety about spending over night for the first time since COVID. Discussed steps to " addressing anxiety at her sisters home and drive up. 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 Became dizzy and become worried about the physical sx she was experiencing. Anxiety grew from that point on. Discussed how she handled it in the moment with redirecting thoughts, accepting anxiety and attempting some of the anxiety tech discussed. Pt will explore other situations in which anxiety tends to arise for next session. Pt has been keeping a journal of her daily experience and will reexamine journal to better understand anxiety. Pt listed several situations she knows brings anxiety.      Identified Triggers:  Feeling rushed  Not wanting to be late  Having others do responsibilities she once carried as her responsibility  Being flexible  Expectations of others  What do others see/think of her when experiencing panic attack    1/23/23  Reports she is doing really well. Reports she will be driving up to see her grandson. She is highly looking forward to this and making [employer] cards. Some anxiety with going in to the grocery store but not once she gets started shopping. Reports her ultimate goal would be to go to the mall alone with traffic as that has been causing anxiety. Headaches continue to be of concern to her. Discussed further and identified them coming following either a panic attack or after high anxiety. Discussed using sensory tech for elevating the anxiety and also for following headache such as heating pad for neck or eyes. Discussed her conversation with Dr. Pompa about possibly changing meds to help with headaches. She reports relishing in current progress and is not wanting meds to change at this time. Headaches do interfere sometimes with daily life. She will be going to lunch with several college friends. This has caused a bit of anxiety in accepting the invite. Normalized experience for pt.       Progress on Treatment Objective(s) / Homework:  Satisfactory  progress - ACTION (Actively working towards change); Intervened by reinforcing change plan / affirming steps taken    Motivational Interviewing    MI Intervention: Expressed Empathy/Understanding, Supported Autonomy, Collaboration, Evocation, Open-ended questions, Change talk (evoked) and Reframe     Change Talk Expressed by the Patient: Desire to change Ability to change Reasons to change Need to change    Provider Response to Change Talk: E - Evoked more info from patient about behavior change, A - Affirmed patient's thoughts, decisions, or attempts at behavior change, R - Reflected patient's change talk and S - Summarized patient's change talk statements      Care Plan review completed: Yes    Medication Review:  No changes to current psychiatric medication(s)    Medication Compliance:  Yes    Changes in Health Issues:   None reported    Chemical Use Review:   Substance Use: Chemical use reviewed, no active concerns identified      Tobacco Use: No current tobacco use.      Assessment: Current Emotional / Mental Status (status of significant symptoms):  Risk status (Self / Other harm or suicidal ideation)  Patient denies a history of suicidal ideation, suicide attempts, self-injurious behavior, homicidal ideation, homicidal behavior and and other safety concerns  Patient denies current fears or concerns for personal safety.  Patient denies current or recent suicidal ideation or behaviors.  Patient denies current or recent homicidal ideation or behaviors.  Patient denies current or recent self injurious behavior or ideation.  Patient denies other safety concerns.  A safety and risk management plan has not been developed at this time, however patient was encouraged to call [facility] should there be a change in any of these risk factors.    Appearance:   Appropriate   Eye Contact:   Good   Psychomotor Behavior: Normal   Attitude:   Cooperative   Orientation:   All  Speech   Rate / Production: Normal     Volume:  Normal   Mood:    Anxious   Affect:    Appropriate   Thought Content:  Clear   Thought Form:  Coherent  Logical   Insight:    Good     Diagnoses:  1. Generalized anxiety disorder    2. Insomnia, unspecified type        Collateral Reports Completed:  Not Applicable    Plan: (Homework, other):  Patient was given information about behavioral services and encouraged to schedule a follow up appointment with the clinic [facility] in 1 week.  She was also given information about mental health symptoms and treatment options .  CD Recommendations: No indications of CD issues.  Erika Francisco MSW LICSW      ______________________________________________________________________    Integrated Primary Care Behavioral Health Treatment Plan    Patient's Name: Jolynn Haji  YOB: 1955    Date of Creation: 10/5/22  Date Treatment Plan Last Reviewed/Revised: 4/27/23    DSM5 Diagnoses: 300.02 (F41.1) Generalized Anxiety Disorder  Psychosocial / Contextual Factors: Individual Factors high anxiety associated with depressive sx, interfereing with ability to function as she would like.  .  PROMIS (reviewed every 90 days):     Referral / Collaboration:  Was/were discussed and patient will pursue. - individual therapy    Anticipated number of session for this episode of care: 5-8 sessions  Anticipation frequency of session: Weekly  Anticipated Duration of each session: 38-52 minutes  Treatment plan will be reviewed in 90 days or when goals have been changed.       MeasurableTreatment Goal(s) related to diagnosis / functional impairment(s)  Goal 1: Patient will work with provider to manage symptoms    I will know I've met my goal when when less anxious and feeling down.      Objective #A (Patient Action)    Patient will  attend all sessions.  Status: Continued - Date(s): 4/27/23    Intervention(s)  Therapist will teach educate patient on treatment options, clarify concerns, work with pt to overcome any resistance to  compliance..    Objective #B  Patient will identify 3 fears / thoughts that contribute to feeling anxious.  Status: Continued - Date(s): 4/27/23     Intervention(s)  Therapist will educate patient on treatment options, clarify concerns, work with pt to overcome any resistance to compliance..    Objective #C  Patient will identify 3 initial signs or symptoms of anxiety.  Status: Continued - Date(s): 4/27/23     Intervention(s)  Therapist will educate patient on treatment options, clarify concerns, work with pt to overcome any resistance to compliance..        Patient has reviewed and agreed to the above plan.      Erika Francisco [facility]  April 27, 2023  Answers for HPI/ROS submitted by the patient on 12/6/2022  TIFFANY 7 TOTAL SCORE: 3  Answers for HPI/ROS submitted by the patient on 3/8/2023  If you checked off any problems, how difficult have these problems made it for you to do your work, take care of things at home, or get along with other people?: Not difficult at all  PHQ9 TOTAL SCORE: 5  Answers for HPI/ROS submitted by the patient on 4/26/2023  If you checked off any problems, how difficult have these problems made it for you to do your work, take care of things at home, or get along with other people?: Somewhat difficult  PHQ9 TOTAL SCORE: 1

## 2023-04-26 ASSESSMENT — PATIENT HEALTH QUESTIONNAIRE - PHQ9
10. IF YOU CHECKED OFF ANY PROBLEMS, HOW DIFFICULT HAVE THESE PROBLEMS MADE IT FOR YOU TO DO YOUR WORK, TAKE CARE OF THINGS AT HOME, OR GET ALONG WITH OTHER PEOPLE: SOMEWHAT DIFFICULT
SUM OF ALL RESPONSES TO PHQ QUESTIONS 1-9: 1
SUM OF ALL RESPONSES TO PHQ QUESTIONS 1-9: 1

## 2023-04-27 ENCOUNTER — TELEPHONE (OUTPATIENT)
Dept: PSYCHIATRY | Facility: CLINIC | Age: 68
End: 2023-04-27
Payer: COMMERCIAL

## 2023-04-27 ENCOUNTER — VIRTUAL VISIT (OUTPATIENT)
Dept: PSYCHIATRY | Facility: CLINIC | Age: 68
End: 2023-04-27
Payer: COMMERCIAL

## 2023-04-27 ENCOUNTER — VIRTUAL VISIT (OUTPATIENT)
Dept: BEHAVIORAL HEALTH | Facility: CLINIC | Age: 68
End: 2023-04-27
Payer: COMMERCIAL

## 2023-04-27 DIAGNOSIS — F39 MOOD DISORDER (H): ICD-10-CM

## 2023-04-27 DIAGNOSIS — G47.00 INSOMNIA, UNSPECIFIED TYPE: ICD-10-CM

## 2023-04-27 DIAGNOSIS — F41.1 GENERALIZED ANXIETY DISORDER: Primary | ICD-10-CM

## 2023-04-27 DIAGNOSIS — F41.1 GAD (GENERALIZED ANXIETY DISORDER): Primary | ICD-10-CM

## 2023-04-27 DIAGNOSIS — R25.1 TREMOR: ICD-10-CM

## 2023-04-27 PROCEDURE — 99214 OFFICE O/P EST MOD 30 MIN: CPT | Mod: VID | Performed by: PSYCHIATRY & NEUROLOGY

## 2023-04-27 PROCEDURE — 90832 PSYTX W PT 30 MINUTES: CPT | Mod: VID | Performed by: SOCIAL WORKER

## 2023-04-27 RX ORDER — TRAZODONE HYDROCHLORIDE 50 MG/1
25-50 TABLET, FILM COATED ORAL
Qty: 30 TABLET | Refills: 1 | Status: SHIPPED | OUTPATIENT
Start: 2023-04-27 | End: 2023-05-11

## 2023-04-27 RX ORDER — PROPRANOLOL HYDROCHLORIDE 20 MG/1
20 TABLET ORAL AT BEDTIME
COMMUNITY
End: 2023-05-11

## 2023-04-27 RX ORDER — TRAZODONE HYDROCHLORIDE 50 MG/1
25-50 TABLET, FILM COATED ORAL
Qty: 30 TABLET | Refills: 1 | Status: SHIPPED | OUTPATIENT
Start: 2023-04-27 | End: 2023-04-27

## 2023-04-27 RX ORDER — PROPRANOLOL HYDROCHLORIDE 10 MG/1
10 TABLET ORAL DAILY
Qty: 90 TABLET | Refills: 3 | Status: SHIPPED | OUTPATIENT
Start: 2023-04-27 | End: 2023-05-11

## 2023-04-27 NOTE — PATIENT INSTRUCTIONS
Treatment Plan:  Disontinue Ambien CR   Start trazodone 25-50 mg at bedtime as needed for sleep  Continue Effexor-XR/venlafaxine  mg daily for depression and anxiety.   Continue propranolol 10 mg in AM and 20 mg at bedtime daily for anxiety, tremor, headaches.  Continue to follow with neurology as they recommend.    Continue all other cares per primary care provider.   Continue all other medications as reviewed per electronic medical record today.   Safety plan reviewed. To the Emergency Department as needed or call after hours crisis line at [phone] or [phone]. [place] Crisis Text Line. Text [place] to 041425 or Suicide LifeLine Chat: suicidepreAmerican Pet Care Corporationline.org/chat  Continue individual psychotherapy for additional support and ongoing development of nonpharmacologic coping skills and strategies.  Schedule an appointment with me in 2 weeks or sooner as needed. Call [facility] at [phone] to schedule.  Follow up with primary care provider as planned or for acute medical concerns.  Call the psychiatric nurse line with medication questions or concerns at [phone].  [employer] may be used to communicate with your provider, but this is not intended to be used for emergencies.  Patient Education   Collaborative Care Psychiatry Service  What to Expect  Here's what to expect from your Collaborative Care Psychiatry Service (CCPS).   About CCPS  CCPS means 2 people work together to help you get better. You'll meet with a behavioral health clinician and a psychiatric doctor. A behavioral health clinician helps people with mental health problems by talking with them. A psychiatric doctor helps people by giving them medicine.  How it works  At every visit, you'll see the behavioral health clinician (BHC) first. They'll talk with you about how you're doing and teach you how to feel better.   Then you'll see the psychiatric doctor. This doctor can help you deal with troubling thoughts and  "feelings by giving you medicine. They'll make sure you know the plan for your care.   CCPS usually takes 3 to 6 visits. If you need more visits, we may have you start seeing a different psychiatric doctor for ongoing care.  If you have any questions or concerns, we'll be glad to talk with you.  About visits  Be open  At your visits, please talk openly about your problems. It may feel hard, but it's the best way for us to help you.  Cancelling visits  If you can't come to your visit, please call us right away at [phone]. If you don't cancel at least 24 hours (1 full day) before your visit, that's \"late cancellation.\"  Being late to visits  Being very late is the same as not showing up. You will be a \"no show\" if:  Your appointment starts with a [facility], and you're more than 15 minutes late for a 30-minute (half hour) visit. This will also cancel your appointment with the psychiatric doctor.  Your appointment is with a psychiatric doctor only, and you're more than 15 minutes late for a 30-minute (half hour) visit.  Your appointment is with a psychiatric doctor only, and you're more than 30 minutes late for a 60-minute (full hour) visit.  If you cancel late or don't show up 2 times within 6 months, we may end your care.   Getting help between visits  If you need help between visits, you can call us Monday to Friday from 8 a.m. to 4:30 p.m. at [phone].  Emergency care  Call 911 or go to the nearest emergency department if your life or someone else's life is in danger.  Call 988 anytime to reach the national Suicide and Crisis hotline.  Medicine refills  To refill your medicine, call your pharmacy. You can also call [facility]'s [facility] at [phone], Monday to Friday, 8 a.m. to 4:30 p.m. It can take 1 to 3 business days to get a refill.   Forms, letters, and tests  You may have papers to fill out, like FMLA, short-term disability, and workability. We can help you with these forms at your " visits, but you must have an appointment. You may need more than 1 visit for this, to be in an intensive therapy program, or both.  Before we can give you medicine for ADHD, we may refer you to get tested for it or confirm it another way.  We may not be able to give you an emotional support animal letter.  We don't do mental health checks ordered by the court.   We don't do mental health testing, but we can refer you to get tested.   Thank you for choosing us for your care.  For informational purposes only. Not to replace the advice of your health care provider. Copyright   2022 [facility]. All rights reserved. [employer] [id number] - 12/22.

## 2023-04-27 NOTE — TELEPHONE ENCOUNTER
Resent the prescription to the  pharmacy. Provider had sent it in today 4/27/23 but it failed due to missing signature.     Darcy Justice RN on 4/27/2023 at 12:03 PM

## 2023-04-27 NOTE — PROGRESS NOTES
"Telemedicine Visit: The patient's condition can be safely assessed and treated via synchronous audio and visual telemedicine encounter.      Reason for Telemedicine Visit: Patient has requested telehealth visit    Originating Site (Patient Location): Patient's home    Distant Location (provider location):  Off-site    Consent:  The patient/guardian has verbally consented to: the potential risks and benefits of telemedicine (video visit) versus in person care; bill my insurance or make self-payment for services provided; and responsibility for payment of non-covered services.     Mode of Communication:  Video Conference via [employer]    As the provider I attest to compliance with applicable laws and regulations related to telemedicine.        Outpatient Psychiatric Progress Note    Name: Jolynn Haji   : 1955                    Primary Care Provider: Helga Ibarra MD   Therapist: Working with DINESH Whipple    PHQ-9 scores:      3/21/2023    11:20 AM 3/23/2023     5:30 PM 2023    11:16 AM   PHQ-9 SCORE   PHQ-9 Total Score MyChart 1 (Minimal depression) 1 (Minimal depression) 1 (Minimal depression)   PHQ-9 Total Score 1 1 1       TIFFANY-7 scores:      2023     7:09 PM 3/21/2023    11:20 AM 3/23/2023     5:33 PM   TIFFANY-7 SCORE   Total Score 4 (minimal anxiety) 1 (minimal anxiety) 5 (mild anxiety)   Total Score 4 1 5       Patient Identification:  Patient is a 67 year old,   White Choose not to answer female  who presents for return visit with me.  Patient is currently retired. Patient attended the phone/video session with her daughter Malini rucker. Patient prefers to be called: \" Azalea\".    Interim History:  I last saw Jolynn Haji for outpatient psychiatry return visit on 3/23/2023. During that appointment, we:     Continue Ambien CR 6.25 mg at bedtime as needed for sleep. Do not take with alcohol or opioid pain medication. Make sure to have 8-10 hours to devote to sleep after taking. " "Careful/caution for falls in middle of night. Discussed sleep medicine referral previously but patient has declined-could consider in future.     Continue Effexor-XR/venlafaxine  mg daily for depression and anxiety.     Increase propranolol to 20 mg twice daily for anxiety, tremor, headaches.    Continue to follow with neurology as they recommend.      Continue all other cares per primary care provider.     4/27: Patient overall with ongoing relative stability of mood and anxiety symptoms.  Propranolol has continued to be helpful.  Patient reports primary care provider would like her to try to get off of Ambien.  Patient agreeable to trying alternative options.  Still having headaches that she feels like have been more frequent lately.  No acute safety concerns.  No SI.  No problematic drug or alcohol use.    Per [facility], [facility], during today's team-based visit:  MH update: doing well with heightened anxiety but not panic attacks.   Stresses: [place] [employer] [place]   Appetite: unremarkable  Sleep: unremarkable  Therapy: seeing therapist weekly, reports sister came for several days that was great. Making decision about traveling for family wedding.   FARIDA: na  Preg: na  Most important: continues to have headaches daily. Wants to discuss Ambien with MD/PCP suggested getting off completely. Would like to have Propanol for 10 mg to not have to cut.      Past Psychiatric Med Trials:  Psych Meds at Intake:  lexapro 20 mg daily  abilify 5 mg daily      Past Psych Meds:  Amitriptyline  Quetiapine  Mirtazapine - stopped \"because I was sleeping through the night\"    Psychiatric ROS:  Jolynn Haji reports mood has been: relatively stable  Anxiety has been: Improved, see HPI above  Sleep has been: Relatively stable but see above HPI  Seema sxs: None  Psychosis sxs: N/A  ADHD/ADD sxs: N/A  PTSD sxs: N/A  PHQ9 and GAD7 scores were reviewed today if completed.   Medication side effects: Denies propranolol ASEs; " possibly headaches and fatigue from venlafaxine  Current stressors include: Symptoms and See HPI above  Coping mechanisms and supports include: Family, Hobbies and Friends    Current medications include:   Current Outpatient Medications   Medication Sig     clindamycin (CLINDAGEL) 1 % external gel Apply topically daily     lactobacillus rhamnosus, GG, (CULTURELL) capsule Take 1 capsule by mouth daily Taking every other day     lisinopril (ZESTRIL) 5 MG tablet Takes 1/2 tablet to equal 2.5 mg     propranolol (INDERAL) 20 MG tablet Take 1 tablet (20 mg) by mouth 2 times daily     venlafaxine (EFFEXOR XR) 150 MG 24 hr capsule Take 150 mg by mouth daily     zolpidem ER (AMBIEN CR) 6.25 MG CR tablet Take 1 tablet (6.25 mg) by mouth nightly as needed for sleep     Current Facility-Administered Medications   Medication     lidocaine 1 % 4 mL     lidocaine 1 % injection 3 mL     lidocaine 1 % injection 4 mL     triamcinolone (KENALOG-40) injection 40 mg       Past Medical/Surgical History:  Past Medical History:   Diagnosis Date     Abnormal glandular Papanicolaou smear of cervix- ASCUS in 2000 12/5/2003    ASC H- 2000     Basal cell carcinoma of leg, right 05/2016    Dr Rogers     Colon polyps 05/2018    tubular adenoam - due 5 yrs - 2 mm     Concussion without loss of consciousness 4/12/2017     Hidradenitis     left  groin     HSIL (high grade squamous intraepithelial lesion) on Pap smear of cervix 7/5/2000    Normal paps from 2001 to 2016/cc     Hypertension goal BP (blood pressure) < 140/90      OA (osteoarthritis) of knee     moderate     Postmenopausal since 7/2008      Shingles 7/23/2010    left      Squamous cell carcinoma of skin, unspecified      Synovial cyst of popliteal space       has a past medical history of Abnormal glandular Papanicolaou smear of cervix- ASCUS in 2000 (12/5/2003), Basal cell carcinoma of leg, right (05/2016), Colon polyps (05/2018), Concussion without loss of consciousness (4/12/2017),  Hidradenitis, HSIL (high grade squamous intraepithelial lesion) on Pap smear of cervix (7/5/2000), Hypertension goal BP (blood pressure) < 140/90, OA (osteoarthritis) of knee, Postmenopausal (since 7/2008 ), Shingles (7/23/2010), Squamous cell carcinoma of skin, unspecified, and Synovial cyst of popliteal space.    She has no past medical history of Malignant melanoma (H) or Squamous cell carcinoma.    Social History:  Reviewed. No changes to social history except as noted above in HPI.    Vital Signs:   None. This is phone/video visit.     Labs:  Most recent laboratory results reviewed and the pertinent results include:   TSH   Date Value Ref Range Status   02/08/2023 1.65 0.30 - 4.20 uIU/mL Final   04/25/2022 1.30 0.40 - 4.00 mU/L Final   01/13/2021 2.07 0.40 - 4.00 mU/L Final     Lab Results   Component Value Date    WBC 8.1 04/25/2022    WBC 6.4 01/13/2021     Lab Results   Component Value Date    RBC 4.58 04/25/2022    RBC 4.54 01/13/2021     Lab Results   Component Value Date    HGB 13.6 04/25/2022    HGB 13.3 01/13/2021     Lab Results   Component Value Date    HCT 41.1 04/25/2022    HCT 41.7 01/13/2021     No components found for: MCT  Lab Results   Component Value Date    MCV 90 04/25/2022    MCV 92 01/13/2021     Lab Results   Component Value Date    MCH 29.7 04/25/2022    MCH 29.3 01/13/2021     Lab Results   Component Value Date    MCHC 33.1 04/25/2022    MCHC 31.9 01/13/2021     Lab Results   Component Value Date    RDW 12.9 04/25/2022    RDW 13.1 01/13/2021     Lab Results   Component Value Date     04/25/2022     01/13/2021     Last Comprehensive Metabolic Panel:  Sodium   Date Value Ref Range Status   02/08/2023 141 136 - 145 mmol/L Final   01/13/2021 140 133 - 144 mmol/L Final     Potassium   Date Value Ref Range Status   02/08/2023 4.1 3.4 - 5.3 mmol/L Final   04/25/2022 4.4 3.4 - 5.3 mmol/L Final   01/13/2021 4.1 3.4 - 5.3 mmol/L Final     Chloride   Date Value Ref Range Status    02/08/2023 103 98 - 107 mmol/L Final   04/25/2022 105 94 - 109 mmol/L Final   01/13/2021 108 94 - 109 mmol/L Final     Carbon Dioxide   Date Value Ref Range Status   01/13/2021 28 20 - 32 mmol/L Final     Carbon Dioxide (CO2)   Date Value Ref Range Status   02/08/2023 25 22 - 29 mmol/L Final   04/25/2022 26 20 - 32 mmol/L Final     Anion Gap   Date Value Ref Range Status   02/08/2023 13 7 - 15 mmol/L Final   04/25/2022 6 3 - 14 mmol/L Final   01/13/2021 4 3 - 14 mmol/L Final     Glucose   Date Value Ref Range Status   02/08/2023 95 70 - 99 mg/dL Final   04/25/2022 104 (H) 70 - 99 mg/dL Final   01/13/2021 91 70 - 99 mg/dL Final     Comment:     Fasting specimen     Urea Nitrogen   Date Value Ref Range Status   02/08/2023 16.2 8.0 - 23.0 mg/dL Final   04/25/2022 23 7 - 30 mg/dL Final   01/13/2021 18 7 - 30 mg/dL Final     Creatinine   Date Value Ref Range Status   02/08/2023 0.62 0.51 - 0.95 mg/dL Final   01/13/2021 0.68 0.52 - 1.04 mg/dL Final     GFR Estimate   Date Value Ref Range Status   02/08/2023 >90 >60 mL/min/1.73m2 Final     Comment:     eGFR calculated using 2021 CKD-EPI equation.   01/13/2021 >90 >60 mL/min/[1.73_m2] Final     Comment:     Non  GFR Calc  Starting 12/18/2018, serum creatinine based estimated GFR (eGFR) will be   calculated using the Chronic Kidney Disease Epidemiology Collaboration   (CKD-EPI) equation.       Calcium   Date Value Ref Range Status   02/08/2023 9.7 8.8 - 10.2 mg/dL Final   01/13/2021 8.8 8.5 - 10.1 mg/dL Final     Bilirubin Total   Date Value Ref Range Status   02/08/2023 0.5 <=1.2 mg/dL Final   01/13/2021 0.8 0.2 - 1.3 mg/dL Final     Alkaline Phosphatase   Date Value Ref Range Status   02/08/2023 73 35 - 104 U/L Final   01/13/2021 72 40 - 150 U/L Final     ALT   Date Value Ref Range Status   02/08/2023 19 10 - 35 U/L Final   01/13/2021 17 0 - 50 U/L Final     AST   Date Value Ref Range Status   02/08/2023 22 10 - 35 U/L Final   01/13/2021 13 0 - 45 U/L  Final     Review of Systems:  10 systems (general, cardiovascular, respiratory, eyes, ENT, endocrine, GI, , M/S, neurological) were reviewed. Most pertinent finding(s) is/are: Some chronic pains, see HPI above. The remaining systems are all unremarkable.    Mental Status Examination (limited as this is by phone/video):  Appearance: Awake, alert, appears stated age, no acute distress, well-groomed   Attitude:  cooperative, pleasant   Motor: No gross abnormalities noted today.  Not formally tested  Oriented to:  person, place, time, and situation  Attention Span and Concentration:  normal  Speech: Normal volume, normal rate, normal rhythm, coherent, no longer very delayed, no longer very hesitant  Language: intact  Mood: Better  Affect: Overall appropriate and mood congruent  Associations:  no loose associations  Thought Process:  logical, linear and goal oriented  Thought Content:  no evidence of suicidal ideation or homicidal ideation, no evidence of psychotic thought, no auditory hallucinations present and no visual hallucinations present  Recent and Remote Memory: Intact to interview.  Not formally assessed.   Fund of Knowledge: appropriate  Insight: Good  Judgment:  intact, adequate for safety  Impulse Control:  intact    Suicide Risk Assessment:  Today Jolynn Haji reports no suicidal ideation. Based on all available evidence including the factors cited above, Jolynn Haji does not appear to be at imminent risk for self-harm, does not meet criteria for a 72-hr hold, and therefore remains appropriate for ongoing outpatient level of care.  A thorough assessment of risk factors related to suicide and self-harm have been reviewed and are noted above. The patient convincingly denies suicidality on several occasions. Local community safety resources reviewed for patient to use if needed. There was no deceit detected, and the patient presented in a manner that was believable.     DSM5 Diagnosis:  300.02 (F41.1)  Generalized Anxiety Disorder   R/O Mood Disorder  Insomnia, unspecified  H/O TBI    Suspected drug-induced parkinsonism  R/O Underlying Parkinson's Disease (pt working with neurology)    Medical comorbidities include:   Patient Active Problem List    Diagnosis Date Noted     Essential hypertension with goal blood pressure less than 140/90 07/16/2001     Priority: High     Drug-induced parkinsonism (H)- Abilify - generic = ARIPiprazole( 5mg)  01/02/2023     Priority: Medium     Mood disorder (H) 01/02/2023     Priority: Medium     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 - much improved - essentially gone   07/07/2022     Priority: Medium     Adjustment insomnia 12/23/2021     Priority: Medium     Screening for cervical cancer      Priority: Medium     1999 NIL  2000 ASCUS-H.  >> Colpo: Neg  2001 NIL   2003 NIL pap, Neg HPV  [id number], 2016  NIL pap  7/25/19 NIL pap, Neg HPV    Criteria necessary to stop screening per ASCCP guidelines:  Older than 65 years  Three consecutive negative cytology results or two consecutive negative cotest results within the previous 10 years, with the most recent being performed within the last 5 years.   (Women with hx of MADDIE 2 or 3, or adenocarcinoma in situ should continue screening for full 20 years, even if this goes past age 65).         Hyperlipidemia LDL goal <130 11/30/2018     Priority: Medium     Basal cell carcinoma      Priority: Medium     Colon polyps 05/01/2018     Priority: Medium     Primary localized osteoarthrosis of left lower leg 02/19/2018     Priority: Medium     Other secondary osteoarthritis of left knee 01/29/2018     Priority: Medium     Chronic constipation 09/14/2017     Priority: Medium     Raynaud's phenomenon without gangrene 09/14/2017     Priority: Medium     Insomnia, unspecified type 07/25/2017     Priority: Medium     Generalized anxiety disorder 05/23/2017     Priority: Medium     Adjustment  disorder with mixed anxiety and depressed mood- mild-moderate  at this time 05/11/2017     Priority: Medium     Family history of celiac disease 05/11/2017     Priority: Medium     Basal cell carcinoma of leg, right 05/01/2016     Priority: Medium     Sun-damaged skin 04/21/2016     Priority: Medium     Seasonal allergic rhinitis 04/21/2016     Priority: Medium     Dupuytren's disease of palm - right 4th digit flexor tendon 06/11/2015     Priority: Medium     History of migraine headaches- assoc. with nausea/vomiting - resolved around menopause - were  premenstrual  01/08/2014     Priority: Medium     Rosacea 05/01/2013     Priority: Medium     Numbness of feet- distal feet R>L  02/21/2011     Priority: Medium     NUMBNESS AND TINGLING OF FOOT - bilateral -mild  08/25/2010     Priority: Medium     Osteopenia 01/02/2009     Priority: Medium     Lipidosis 10/24/2006     Priority: Medium     Problem list name updated by automated process. Provider to review       Localized osteoarthritis of hand 11/01/2005     Priority: Medium     Problem list name updated by automated process. Provider to review       Stress incontinence, female - mild 10/23/2012     Priority: Low     Cough 10/23/2012     Priority: Low     Acute bronchitis- recurrent- ? related to mold in school building- pt has since retired and no further bronchitis 10/23/2012     Priority: Low     Postmenopausal      Priority: Low     DERMATOPHYTOSIS OF NAIL- toenails  03/14/2006     Priority: Low     trichophyton rubrum on culture - 1/06        Synovial cyst of popliteal space      Priority: Low     Hidradenitis 12/05/2003     Priority: Low       Psychosocial & Contextual Factors: see HPI above    Assessment:  From Intake, 5/12/2022:  Jolynn Haji is a 66-year-old female with a history including anxiety, depression, insomnia status post concussion in April 2017.  No major mental health struggles prior to concussion in 2017.  Patient was started on Lexapro,  quetiapine, mirtazapine to help with her symptoms.  Patient had been feeling much better on that medication combination and once she was sleeping well again quetiapine and mirtazapine were discontinued.  This past January the patient started to have some worsening symptoms and Abilify was added.  Patient has felt like her current medications of Lexapro and Abilify have been helpful but reports starting to feel really slowed and numb the past several weeks.  She also reported a weight loss of nearly 20 pounds over the last 6 weeks versus several months (patient was not very sure). I am wondering if she could be experiencing side effects of her increased Lexapro dose.  She is agreeable to decreasing her dose slightly to 15 mg daily.  Could also consider decreasing Abilify in the case it is not her Lexapro.  She also desires to sleep a little bit better than she has been.  Could consider restarting mirtazapine at bedtime for sleep and to also augment her Lexapro.  Could also be beneficial for patient to undergo cognitive screening to check in and see what her baseline is status post concussion.  No acute safety concerns.  No SI.  No problematic drug or alcohol use.    5/24/2022:  Patient with some slight improvements of possible medication related side effects after decreasing Lexapro.  I am wondering if there might be more robust improvements with decreasing Abilify/aripiprazole.  Possible drug-induced Parkinsonism?  She does seem to have delayed/slowed speech, masked facies.  Her gait and other movements unable to be observed but she does report other struggles that may be consistent with such a presentation.  We are going to decrease her Abilify down to 2 mg daily and I will see her back in just a few weeks.  We will likely discontinue her Abilify at her next visit.  I will message her primary care provider to get some collateral regarding previous baseline functioning prior to starting Abilify.  History of TBI  could be confounding presentation.  Could consider neurology consult.    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  Instructed to stop Abilify today.  We will monitor for improvement of symptoms.  If symptoms do not improve may need to refer to neurology.  If symptoms start to improve we will consider low-dose methylphenidate augmentation of Lexapro next visit to help with low energy, mood, focus and concentration, cognition, s/p TBI if still clinically indicated.  We would discuss risks and benefits with patient and her daughter at that time.  No other acute safety concerns or SI.  No problematic drug or alcohol use.  If patient starts to struggle with sleep would restart mirtazapine at bedtime.    7/28/2022:  Pt still with sxs consistent with drug-induced Parksinsonism.  Neurology consult will be placed so that patient might get scheduled sooner than later due to the typically long wait.  We will start Ambien in hopes we can get her sleeping much better.  Discussed risks and benefits of its use, particularly in the geriatric population.  Lexapro has not been as helpful as it helped for her anxiety.  We will taper this medication and start Effexor/venlafaxine in hopes that might be more effective for her symptoms.  No acute safety concerns.  No SI.  No problematic drug or alcohol use.    8/11/2022:  Overall with continued improved symptoms.  Hopefully patient will continue to have improvement of her symptoms off of Abilify.  Patient will be seeing neurology soon.  Encouraged to keep appointment.  We will continue taper off of Lexapro and continue on monotherapy with venlafaxine.  We will also continue Ambien at this time for sleep since patient is sleeping much better on the medication with improved symptoms and tolerating well.  No acute safety concerns.  No problematic drug or alcohol use.    9/1/2022:  Ongoing anxiety, still intense at times.  Still not sleeping very well.  Could  consider sleep evaluation, but patient quite overwhelmed right now with various referrals and doctors appointments.  Ambien had been working quite well but not keeping her sleep now.  Still falling asleep relatively well.  We will transition to Ambien CR to see if that is more helpful.  We will continue to consider sleep evaluation to rule out possibility of REM behavior sleep disorder.  We will also transition venlafaxine to bedtime to see if that helps with any possible nausea.  Working with neurology to monitor for Parkinson's disease.  I do recommend neuropsychological testing due to ongoing possible cognitive difficulties/struggles.  No acute safety concerns.  No SI.  No problematic drug or alcohol use.    10/3/2022:  Patient overall with some ongoing anxiety but slowly improving since it got worse late August.  Recommend continuing to optimize Effexor-XR, especially since patient tolerating well.  No longer having any nausea now that dose is taken at bedtime.  Patient feeling a little foggy and off balance with Ambien CR and so we will switch back to Ambien immediate release.  No falls.  Recommend increasing Effexor-XR first and once well tolerated switching back to Ambien immediate release.  May need to continue to work on alternative options for sleep if does not do well back on Ambien immediate release.  No acute safety concerns.  No SI.  No problematic drug or alcohol use.  Encouraged to continue working with neurology.  Next appointment in November.    10/24/2022:  Patient overall with some improved anxiety on increased dose of Effexor-XR.  Could use additional improvement of anxiety and so we will continue to optimize therapy with Effexor-XR.  We will increase dose to 150 mg daily.  Tolerating well with no notable negative side effects.  Patient transitioned back to Ambien CR and is sleeping better on this compared to Ambien immediate release.  We will continue Ambien CR for sleep.  Discussed possibility  of sleep medicine referral but patient declines at this time.  Could consider in the future.  No acute safety concerns.  No SI.  No problematic drug or alcohol use.  Encouraged to continue working with neurology.    12/09/2022:  Patient overall doing fairly well.  Feels like increased dose of venlafaxine has been helpful.  I do wonder if she is having some subtle withdrawal symptoms late in the afternoon with headache and fatigue.  Discussed shifting her dose to the morning.  However, she reports she will try to drink more water and see if that is helpful before trying to make changes to her medication.  She feels like things are going well enough that she really does not want to change anything.  Encouraged to continue in therapy.  No acute safety concerns.  No SI.  No problematic drug or alcohol use.    1/19/2023:  Patient overall doing well with ongoing improvements.  Patient even recently starting to drive again which is a big deal.  She continues in therapy and finds it helpful.  Discussed possible changes to her dosing to see if headaches might improve, along with fatigue.  Discussed possibly splitting her Effexor-XR dose as 75 mg twice daily versus 37 point 5 in the morning and 112.5 mg at bedtime.  Patient feels like she is in a good place right now and really does not want to make any medication changes if possible.  We will keep things the same for now.  Encouraged to drink lots of water.  No acute safety concerns.  No SI.  No problematic drug or alcohol use.    3/9/2023:  Since last visit we decreased Effexor-XR just slightly.  Unfortunately her anxiety has worsened on decreased dose.  Headaches and fatigue did not improve at all.  Since anxiety worsened we will go back up to 150 mg daily.  Patient prefers this over a trial of further decreased dose.  We will start a trial with propranolol to see if this helps improve anxiety and headaches.  May also help with her tremor.  Discussed watching for feeling  too lightheaded or dizzy.  Patient also on low-dose lisinopril.  Last vital signs blood pressure 132/78 and pulse 96 on 2/24/2023.  No acute safety concerns.  No SI.  No problematic drug or alcohol use.    3/23/2023:  Patient overall doing very well.  Propranolol has been quite helpful.  Still having some anxiety and headaches so we will continue to optimize therapy with propranolol some.  We will increase to 20 mg twice daily.  Neuropsychiatric testing reviewed.  Some mild deficits but nothing indicating a neurocognitive disorder at this time.  This was reassuring to the patient.  Patient will review results with neurologist at upcoming appointment in May.  No acute safety concerns.  No SI.  No problematic drug or alcohol use.    4/27/2023:  Patient overall with relative stability of mood and anxiety symptoms.  We will work to discontinue Ambien to see if we can manage on medication such as trazodone.  I still continue to worry about possibility of prodromal Parkinson's disease.  Patient reported her sister noted patient to be yelling and screaming in her sleep the other night.  This concerns me for possible REM sleep behavior disorder.  Could simply be related to antidepressant therapy-but given recent parkinsonism response to neuroleptic, symptoms should continue to be monitored closely.  Could again consider sleep medicine referral at some point in the future if needed.  Patient also continues to have headaches.  If patient remains stable would like to trial decreased dose of Effexor-XR to see if this helps improve headaches.  No acute safety concerns.  No SI.  No problematic drug or alcohol use.    Medication side effects and alternatives were reviewed. Health promotion activities recommended and reviewed today. All questions addressed. Education and counseling completed regarding risks and benefits of medications and psychotherapy options. Recommend therapy for additional support.     Treatment  Plan:    Disontinue Ambien CR     Start trazodone 25-50 mg at bedtime as needed for sleep    Continue Effexor-XR/venlafaxine  mg daily for depression and anxiety.     Continue propranolol 10 mg in AM and 20 mg at bedtime daily for anxiety, tremor, headaches.    Continue to follow with neurology as they recommend.      Continue all other cares per primary care provider.     Continue all other medications as reviewed per electronic medical record today.     Safety plan reviewed. To the Emergency Department as needed or call after hours crisis line at [phone] or [phone]. [place] Crisis Text Line. Text [place] to 847547 or Suicide LifeLine Chat: suicidepreventionBirthday Gorillaline.org/chat    Continue individual psychotherapy for additional support and ongoing development of nonpharmacologic coping skills and strategies.    Schedule an appointment with me in 2 weeks or sooner as needed. Call [facility] at [phone] to schedule.    Follow up with primary care provider as planned or for acute medical concerns.    Call the psychiatric nurse line with medication questions or concerns at [phone].    [employer] On[employer]et may be used to communicate with your provider, but this is not intended to be used for emergencies.    Administrative Billing:   Phone Call/Video Duration: 15 Minutes  Start: 10:26a  Stop: 10:41a    Patient Status:  Patient will continue to be seen for ongoing consultation and stabilization.    Signed:   Judy Pompa DO  [facility]    Disclaimer: This note consists of symbols derived from keyboarding, dictation and/or voice recognition software. As a result, there may be errors in the script that have gone undetected. Please consider this when interpreting information found in this chart.

## 2023-04-27 NOTE — TELEPHONE ENCOUNTER
4/27/23: Reason for call:  Medication   If this is a refill request, has the caller requested the refill from the pharmacy already? Yes  Will the patient be using a [facility]? Yes  Name of the pharmacy and phone number for the current request: [facility]    Name of the medication requested: traZODone (DESYREL) 50 MG tablet    Other request: Pharmacist called stating that they received the rx, but that it lacked electronic signature from provider. Asked that she resent.     Phone number to reach patient:  Other phone number:  [facility] [phone]    Best Time:  ASAP if any questions.    Can we leave a detailed message on this number?  Not Applicable    Travel screening: Not Applicable

## 2023-04-28 NOTE — PROGRESS NOTES
[facility] Integrated Behavioral Health  May 1, 2023      Behavioral Health Clinician Progress Note    Patient Name: Jolynn Haji           Service Type:  Individual      Service Location:   MyChart / Email (patient reached)     Session Start Time: 11:00 am  Session End Time: 11:48 am      Session Length: 38 - 52      Attendees: Patient     Service Modality:  Video Visit:      Provider verified identity through the following two step process.  Patient provided:  Patient is known previously to provider    Telemedicine Visit: The patient's condition can be safely assessed and treated via synchronous audio and visual telemedicine encounter.      Reason for Telemedicine Visit: Patient has requested telehealth visit    Originating Site (Patient Location): Patient's home    Distant Site (Provider Location): [facility]    Consent:  The patient/guardian has verbally consented to: the potential risks and benefits of telemedicine (video visit) versus in person care; bill my insurance or make self-payment for services provided; and responsibility for payment of non-covered services.     Patient would like the video invitation sent by:  My Chart    Mode of Communication:  Video Conference via Amwell    As the provider I attest to compliance with applicable laws and regulations related to telemedicine.    Visit Activities (Refresh list every visit): [facility] [place] Only    Diagnostic Assessment Date: 9/27/22  Treatment Plan Review Date: 7/27/23  See Flowsheets for today's PHQ-9 and TIFFANY-7 results  Previous PHQ-9:       3/21/2023    11:20 AM 3/23/2023     5:30 PM 4/26/2023    11:16 AM   PHQ-9 SCORE   PHQ-9 Total Score MyChart 1 (Minimal depression) 1 (Minimal depression) 1 (Minimal depression)   PHQ-9 Total Score 1 1 1     Previous TIFFANY-7:       2/23/2023     7:09 PM 3/21/2023    11:20 AM 3/23/2023     5:33 PM   TIFFANY-7 SCORE   Total Score 4 (minimal anxiety) 1 (minimal anxiety) 5  (mild anxiety)   Total Score 4 1 5       KARINA LEVEL:      8/27/2010     3:00 PM 2/22/2011    11:00 AM   KARINA Score (Last Two)   KARINA Raw Score 51 49   Activation Score 91.6 82.8   KARINA Level 4 4       DATA  Extended Session (60+ minutes): No  Interactive Complexity: No  Crisis: No  [facility] Patient: No    Treatment Objective(s) Addressed in This Session:  Target Behavior(s): anxiety and depression    Depressed Mood: Increase interest, engagement, and pleasure in doing things  Decrease frequency and intensity of feeling down, depressed, hopeless  Improve quantity and quality of night time sleep / decrease daytime naps  Feel less tired and more energy during the day   Improve diet, appetite, mindful eating, and / or meal planning  Identify negative self-talk and behaviors: challenge core beliefs, myths, and actions  Improve concentration, focus, and mindfulness in daily activities   Feel less fidgety, restless or slow in daily activities / interpersonal interactions  Anxiety: will experience a reduction in anxiety, will develop more effective coping skills to manage anxiety symptoms, will develop healthy cognitive patterns and beliefs and will increase ability to function adaptively    Current Stressors / Issues:  Reports they drove to the [employer] this weekend. Feels things are progressing well. Discussed switching medication for sleep to Trazodone. She has been sleeping the same on Ambien. She is taking a half pill and not having any grogginess. She has been engaged with friends and [employer]. Recommended Gifts of Imperfections and I Thought It Was Just Me by Darrick Arteaga. Also Decluttering Your Mind by CAT Melgar. Discussed having others notice positive changes in her. She struggles with having that spot light on her.       [facility]  MH update: doing well with heightened anxiety but not panic attacks.   Stresses: [employer] renovations   Appetite: unremarkable  Sleep: unremarkable  Therapy: seeing therapist weekly,  "reports sister came for several days that was great. Making decision about traveling for family wedding.   FARIDA: na  Preg: na  Most important: continues to have headaches daily. Wants to discuss Ambien with MD/PCP suggested getting off completely. Would like to have Propanol for 10 mg to not have to cut.      4/19/23  Reports managing her anxiety around [place] has been difficult. Discussed desire for control and letting go.Reports sister is coming for several days.  is having surgery tomorrow. She has been walking and exercising more.      4/12/23  Reports it she is doing well and had a great Easter with family. Discussed more about her anxiety (ABC), being self blaming (what ifs/should have) at nature but at other times unknown source. Discussed eliminating \"should' for vocabulary due to shame, self judgement, and regret associated with the word. She will continue to work on ABC and look at the use of should.     3/31/23  Reports she is doing well. Anxiety has been pretty good but has headaches when anxious. Looking forward to going to theater with grandchildren. Medications have been very helpful. Discussed ABC of Anxiety. Discussed events of anxiety at its worse. Discussed the beliefs she has about anxiety events. Discussed consequences of event.     3/23/23 [facility]  MH update: Reports anxiety has decreased a lot on the propanol. Continues to have headaches but only about half the days. She has noticed a stronger correlation with headaches and anxiety. She completed Neuro Psych testing. Relieved she has no new dx. Planning on walking with dog. She is feeling more like her self, \"5 star days.\"    Stresses: Neuro psych testing and going to wake alone was stressful. Felt she managed very well.   Appetite: unremarkable  Sleep: unremarkable  Therapy: Continues to do weekly therapy. [place] she worried a lot about testing and did not , lesson in not worrying about things that have not " "happened yet. Feeling very accomplished after attending a wake alone without her . She stated it went very well and anxiety was lower than normal and then expected. Discussed decision making in the moment. Discussed it can be difficult to make decisions in the moment. Continues with coping skills, ie meditation and breathing tch.   FARIDA: na  Preg: na  Most important: Wants to address doses of medications.     3/14/23  Feels the propanol has been really helping with anxiety. She has been feeling like her self for the past 5 days. Continues to have several headaches. She is considering going back to yoga after looking as some neck stretches.     3/9/23 ([facility])  She has been more anxious on the different dosing of medications. She has also not noticed decrease in headaches. Discussed neck tension and using body scanning. She continues to feel a bit foggy in the afternoons.     3/3/23  Reports doing well. States she has been in communication with MD about headaches. Has noticed on days when she has lower dose of med she has more anxiety. She has been examining her anxiety when going out socially. Does not feel it is associated with the socializing itself but rather \"will the anxiety come up.\" Discussed upcoming appt for neuro psych testing. Is hesitant but willing to do testing. May want to talk to MD about it prior appt. Fearful about Drug Induced Parkinson's.       2/17/23  Pt reports she is doing well. Reports anxiety has been better. Able to control it better with accepting and reognition of anxiety. Continues to utilize meditation and review of skills taught in therapy. Ice packs has been helping with headaches. Self talk has much improved and has gained more confidence. Anxiety in the evening have improved but still feeling the anxiety at times. She has been wanting to work exercise into her daily routine. Sleep has been also improve. ID values: umm, family, self respect responsibility and kindness.   " "    2/10/23  Pt reports she had such a good time with her trip to her sisters house. Much reduced anxious than expected (compared to home) and sleep was really good. Discussed implementing things that worked at sisters for home. Continues to have some anxiety when going into store or before events. Reports panic attacks are less than previous week. Discussed triggers for anxiety. Discussed do expectations of self meet values.  ID values from next time and look at expectations for self. Pt asked \"What could I do better to be good enough\". Struggles with being good enough for self. Talked about list of things she wants to achieve in order to feel \"good enough\". One being able to go to places with out wondering if she will be anxious.     Identified Triggers for anxiety and background thought behind it:  More anxious when tired: less bandwidth to utilize skills  Feeling going to be late  Making quick decisions  Technology   organizing big meal for company: what people expect of her  MD visits or waiting for visit: anticipations of events at MD, has not had poor experiences, husbands medical experience   What if something happens to Zach   Packing groceries and people are behind her  Sometimes going out to eat (much better): menu with too many options and having to make decisions  Holiday: anticipation and wanting it to be perfect for people/what ng people expect of her  Dealing with insurance problem: hassle and being out of her control   Being in the unknown about things: Slowness of recovery from anxiety, what ifs  Do people see I am anxious  Large crowd of people: leading up to and during (highest), people might be noticing her, dont want to stand   *Things that need to be done but are not done(floor needing to be washed, getting decorations down etc): likes list to check off (what to accomplish that week), Not accomplishing goal. Self placed expectations (high standards from growing up), perfectionism (mom " perfectionism as a child), some self comparisons to others, taking on too much during a week, and expecting to be like old self all the time (compare self to old self).     2/1/23  Pt is reporting she is doing well. She has been out driving to do shopping and seeing friends. Continues to have improvement with anxiety each day. Continues to have headaches but is wanting to change medications until she gets back from trip to her sisters home. Having some anxiety about spending over night for the first time since COVID. Discussed steps to addressing anxiety at her sisters home and drive up. 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 Became dizzy and become worried about the physical sx she was experiencing. Anxiety grew from that point on. Discussed how she handled it in the moment with redirecting thoughts, accepting anxiety and attempting some of the anxiety tech discussed. Pt will explore other situations in which anxiety tends to arise for next session. Pt has been keeping a journal of her daily experience and will reexamine journal to better understand anxiety. Pt listed several situations she knows brings anxiety.      Identified Triggers:  Feeling rushed  Not wanting to be late  Having others do responsibilities she once carried as her responsibility  Being flexible  Expectations of others  What do others see/think of her when experiencing panic attack    1/23/23  Reports she is doing really well. Reports she will be driving up to see her grandson. She is highly looking forward to this and making enrich-in cards. Some anxiety with going in to the grocery store but not once she gets started shopping. Reports her ultimate goal would be to go to the mall alone with traffic as that has been causing anxiety. Headaches continue to be of concern to her. Discussed further and identified them coming following either a panic attack or after high anxiety. Discussed using sensory tech for  elevating the anxiety and also for following headache such as heating pad for neck or eyes. Discussed her conversation with Dr. Pompa about possibly changing meds to help with headaches. She reports relishing in current progress and is not wanting meds to change at this time. Headaches do interfere sometimes with daily life. She will be going to lunch with several college friends. This has caused a bit of anxiety in accepting the invite. Normalized experience for pt.       Progress on Treatment Objective(s) / Homework:  Satisfactory progress - ACTION (Actively working towards change); Intervened by reinforcing change plan / affirming steps taken    Motivational Interviewing    MI Intervention: Expressed Empathy/Understanding, Supported Autonomy, Collaboration, Evocation, Open-ended questions, Change talk (evoked) and Reframe     Change Talk Expressed by the Patient: Desire to change Ability to change Reasons to change Need to change    Provider Response to Change Talk: E - Evoked more info from patient about behavior change, A - Affirmed patient's thoughts, decisions, or attempts at behavior change, R - Reflected patient's change talk and S - Summarized patient's change talk statements      Care Plan review completed: Yes    Medication Review:  No changes to current psychiatric medication(s)    Medication Compliance:  Yes    Changes in Health Issues:   None reported    Chemical Use Review:   Substance Use: Chemical use reviewed, no active concerns identified      Tobacco Use: No current tobacco use.      Assessment: Current Emotional / Mental Status (status of significant symptoms):  Risk status (Self / Other harm or suicidal ideation)  Patient denies a history of suicidal ideation, suicide attempts, self-injurious behavior, homicidal ideation, homicidal behavior and and other safety concerns  Patient denies current fears or concerns for personal safety.  Patient denies current or recent suicidal ideation or  behaviors.  Patient denies current or recent homicidal ideation or behaviors.  Patient denies current or recent self injurious behavior or ideation.  Patient denies other safety concerns.  A safety and risk management plan has not been developed at this time, however patient was encouraged to call [place] [phone] should there be a change in any of these risk factors.    Appearance:   Appropriate   Eye Contact:   Good   Psychomotor Behavior: Normal   Attitude:   Cooperative   Orientation:   All  Speech   Rate / Production: Normal    Volume:  Normal   Mood:    Anxious   Affect:    Appropriate   Thought Content:  Clear   Thought Form:  Coherent  Logical   Insight:    Good     Diagnoses:  1. Generalized anxiety disorder    2. Insomnia, unspecified type        Collateral Reports Completed:  Not Applicable    Plan: (Homework, other):  Patient was given information about behavioral services and encouraged to schedule a follow up appointment with the clinic [facility] in 1 week.  She was also given information about mental health symptoms and treatment options .  CD Recommendations: No indications of CD issues.  Erika Francisco [facility] [place]IC[place]      ______________________________________________________________________    Integrated Primary Care Behavioral Health Treatment Plan    Patient's Name: Jolynn Haji  YOB: 1955    Date of Creation: 10/5/22  Date Treatment Plan Last Reviewed/Revised: 4/27/23    DSM5 Diagnoses: 300.02 (F41.1) Generalized Anxiety Disorder  Psychosocial / Contextual Factors: Individual Factors high anxiety associated with depressive sx, interfereing with ability to function as she would like.  .  PROMIS (reviewed every 90 days):     Referral / Collaboration:  Was/were discussed and patient will pursue. - individual therapy    Anticipated number of session for this episode of care: 5-8 sessions  Anticipation frequency of session: Weekly  Anticipated Duration of each session: 38-52 minutes  Treatment  plan will be reviewed in 90 days or when goals have been changed.       MeasurableTreatment Goal(s) related to diagnosis / functional impairment(s)  Goal 1: Patient will work with provider to manage symptoms    I will know I've met my goal when when less anxious and feeling down.      Objective #A (Patient Action)    Patient will  attend all sessions.  Status: Continued - Date(s): 4/27/23    Intervention(s)  Therapist will teach educate patient on treatment options, clarify concerns, work with pt to overcome any resistance to compliance..    Objective #B  Patient will identify 3 fears / thoughts that contribute to feeling anxious.  Status: Continued - Date(s): 4/27/23     Intervention(s)  Therapist will educate patient on treatment options, clarify concerns, work with pt to overcome any resistance to compliance..    Objective #C  Patient will identify 3 initial signs or symptoms of anxiety.  Status: Continued - Date(s): 4/27/23     Intervention(s)  Therapist will educate patient on treatment options, clarify concerns, work with pt to overcome any resistance to compliance..        Patient has reviewed and agreed to the above plan.      Erika Francisco [facility]  May 1, 2023  Answers for HPI/ROS submitted by the patient on 12/6/2022  TIFFANY 7 TOTAL SCORE: 3  Answers for HPI/ROS submitted by the patient on 3/8/2023  If you checked off any problems, how difficult have these problems made it for you to do your work, take care of things at home, or get along with other people?: Not difficult at all  PHQ9 TOTAL SCORE: 5  Answers for HPI/ROS submitted by the patient on 4/26/2023  If you checked off any problems, how difficult have these problems made it for you to do your work, take care of things at home, or get along with other people?: Somewhat difficult  PHQ9 TOTAL SCORE: 1

## 2023-05-01 ENCOUNTER — VIRTUAL VISIT (OUTPATIENT)
Dept: BEHAVIORAL HEALTH | Facility: CLINIC | Age: 68
End: 2023-05-01
Payer: COMMERCIAL

## 2023-05-01 DIAGNOSIS — F41.1 GENERALIZED ANXIETY DISORDER: Primary | ICD-10-CM

## 2023-05-01 DIAGNOSIS — G47.00 INSOMNIA, UNSPECIFIED TYPE: ICD-10-CM

## 2023-05-01 PROCEDURE — 90834 PSYTX W PT 45 MINUTES: CPT | Mod: VID | Performed by: SOCIAL WORKER

## 2023-05-03 ASSESSMENT — PATIENT HEALTH QUESTIONNAIRE - PHQ9
SUM OF ALL RESPONSES TO PHQ QUESTIONS 1-9: 1
SUM OF ALL RESPONSES TO PHQ QUESTIONS 1-9: 1
10. IF YOU CHECKED OFF ANY PROBLEMS, HOW DIFFICULT HAVE THESE PROBLEMS MADE IT FOR YOU TO DO YOUR WORK, TAKE CARE OF THINGS AT HOME, OR GET ALONG WITH OTHER PEOPLE: NOT DIFFICULT AT ALL

## 2023-05-03 ASSESSMENT — ANXIETY QUESTIONNAIRES
4. TROUBLE RELAXING: NOT AT ALL
5. BEING SO RESTLESS THAT IT IS HARD TO SIT STILL: NOT AT ALL
GAD7 TOTAL SCORE: 3
GAD7 TOTAL SCORE: 3
IF YOU CHECKED OFF ANY PROBLEMS ON THIS QUESTIONNAIRE, HOW DIFFICULT HAVE THESE PROBLEMS MADE IT FOR YOU TO DO YOUR WORK, TAKE CARE OF THINGS AT HOME, OR GET ALONG WITH OTHER PEOPLE: NOT DIFFICULT AT ALL
GAD7 TOTAL SCORE: 3
7. FEELING AFRAID AS IF SOMETHING AWFUL MIGHT HAPPEN: NOT AT ALL
6. BECOMING EASILY ANNOYED OR IRRITABLE: NOT AT ALL
2. NOT BEING ABLE TO STOP OR CONTROL WORRYING: SEVERAL DAYS
3. WORRYING TOO MUCH ABOUT DIFFERENT THINGS: SEVERAL DAYS
7. FEELING AFRAID AS IF SOMETHING AWFUL MIGHT HAPPEN: NOT AT ALL
1. FEELING NERVOUS, ANXIOUS, OR ON EDGE: SEVERAL DAYS
8. IF YOU CHECKED OFF ANY PROBLEMS, HOW DIFFICULT HAVE THESE MADE IT FOR YOU TO DO YOUR WORK, TAKE CARE OF THINGS AT HOME, OR GET ALONG WITH OTHER PEOPLE?: NOT DIFFICULT AT ALL

## 2023-05-05 ENCOUNTER — OFFICE VISIT (OUTPATIENT)
Dept: FAMILY MEDICINE | Facility: CLINIC | Age: 68
End: 2023-05-05
Payer: COMMERCIAL

## 2023-05-05 VITALS
TEMPERATURE: 98 F | OXYGEN SATURATION: 100 % | WEIGHT: 141.3 LBS | HEART RATE: 76 BPM | HEIGHT: 68 IN | DIASTOLIC BLOOD PRESSURE: 78 MMHG | BODY MASS INDEX: 21.41 KG/M2 | SYSTOLIC BLOOD PRESSURE: 122 MMHG | RESPIRATION RATE: 16 BRPM

## 2023-05-05 DIAGNOSIS — Z12.11 SCREEN FOR COLON CANCER: ICD-10-CM

## 2023-05-05 DIAGNOSIS — F51.02 ADJUSTMENT INSOMNIA: ICD-10-CM

## 2023-05-05 DIAGNOSIS — R41.843: ICD-10-CM

## 2023-05-05 DIAGNOSIS — F43.23 ADJUSTMENT DISORDER WITH MIXED ANXIETY AND DEPRESSED MOOD: Primary | ICD-10-CM

## 2023-05-05 PROCEDURE — 99214 OFFICE O/P EST MOD 30 MIN: CPT | Performed by: FAMILY MEDICINE

## 2023-05-05 ASSESSMENT — ANXIETY QUESTIONNAIRES: GAD7 TOTAL SCORE: 3

## 2023-05-05 NOTE — PROGRESS NOTES
Assessment & Plan :       ICD-10-CM    1. Adjustment disorder with mixed anxiety and depressed mood- mild-moderate  at this time  F43.23       2. 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 - much improved - resolved as of 5/5/2023   R41.843       3. Adjustment insomnia - resolved as of 5/5/2023 - off of ambien with psychiatry's okay   F51.02       4. Screen for colon cancer  Z12.11 CANCELED: Colonoscopy Screening  Referral          Return in 2 months (on 7/12/2023) for depression/anxiety, w/ Dr. SUE for 40 minute appointment.   Future Appointments 5/5/2023 - 11/1/2023      Date Visit Type Length Department Provider     5/11/2023  1:00 PM [facility] [place] RETURN 30 min [facility]IOR[facility]    Location Instructions:     Your appointment is at [facility] located in the [facility] at [address] [place] [address]. Please check in at the  in [address].              5/11/2023  1:30 PM [facility] ADULT PSYCHIATRY RETURN 30 min [facility] PSYCHIATRY Judy Pompa DO    Location Instructions:     [facility] has 2 buildings on its campus. Please visit us at [address] [place], [place] and enter the building with the [phone] on it.               5/16/2023 10:30 AM RETURN 15 min [facility] D[facility]MATOLOG[facility] Ligia Carbajal, JACQUIE    Location Instructions:     [address] [place] [id number]              5/17/2023 11:00 AM [facility] [place] RETURN 60 min CS BEHAVIORAL HEALTH Erika Francisco, LICSW    Location Instructions:     Your appointment is at [facility] located in the [facility] at [address] [place] [address]. Please check in at the  in [address].                   increase her vitamin D to 2000iu daily.   Ordering of each unique test  Prescription drug management  35 minutes spent by me on the date of the encounter doing chart  review, history and exam, documentation and further activities per the note     Please,  continue your current medications and/or supplements and follow up as we discussed today.     MEDICATIONS:   No orders of the defined types were placed in this encounter.         - Continue other medications without change  Regular exercise  See Patient Instructions.          Helga Ibarra MD  [facility] Tristan is a 67 year old, presenting for the following health issues:  Recheck Medication        5/5/2023     4:29 PM   Additional Questions   Roomed by Marnie MCELROY LPN     History of Present Illness       Mental Health Follow-up:  Patient presents to follow-up on Depression & Anxiety.Patient's depression since last visit has been:  Good  The patient is not having other symptoms associated with depression.  Patient's anxiety since last visit has been:  Good  The patient is having other symptoms associated with anxiety.  Any significant life events: No  Patient is feeling anxious or having panic attacks.  Patient has no concerns about alcohol or drug use.    She eats 4 or more servings of fruits and vegetables daily.She consumes 0 sweetened beverage(s) daily.She exercises with enough effort to increase her heart rate 30 to 60 minutes per day.  She exercises with enough effort to increase her heart rate 5 days per week.   She is taking medications regularly.    Today's PHQ-9         PHQ-9 Total Score: 1    PHQ-9 Q9 Thoughts of better off dead/self-harm past 2 weeks :   Not at all    How difficult have these problems made it for you to do your work, take care of things at home, or get along with other people: Not difficult at all  Today's TIFFANY-7 Score: 3     Came off ambien- using trazodone instead per Dr. Pompa's instructions & prescriptions.  Her headaches are gone and anxiety is much better as well.      BP Readings from Last 6 Encounters:   05/05/23 122/78   03/24/23 122/84    02/24/23 132/78   02/02/23 126/68   01/13/23 126/76   12/16/22 130/76     No further anxiety driving at all.   Visiting with friends and family has become much easier.      Her  [place]c [place] and forgot to get an estimate on the renovations on their bathroom renovations there before demolition started.      Due for repeat colonoscopy from her tubular adenoma from 5/1/2018.   Has appt already with [facility].     Dexa scan : osteopenia of hips noted.   Vitamin D level was 27 on  2/8/2023 - will have pt increase her vitamin D to 2000iu daily.      Zach has new lesions on liver and pancreas = noncancerous on biopsy = precancerous cysts.  Will be having surgery upcoming.     Pt still seeing her therapist Erika and will continue to see her.      Patient Active Problem List   Diagnosis     Essential hypertension with goal blood pressure less than 140/90     Hidradenitis     Localized osteoarthritis of hand     Synovial cyst of popliteal space     DERMATOPHYTOSIS OF NAIL- toenails      Lipidosis     Osteopenia     Postmenopausal     NUMBNESS AND TINGLING OF FOOT - bilateral -mild      Numbness of feet- distal feet R>L      Stress incontinence, female - mild     Cough     Acute bronchitis- recurrent- ? related to mold in school building- pt has since retired and no further bronchitis     Rosacea     History of migraine headaches- assoc. with nausea/vomiting - resolved around menopause - were  premenstrual      Dupuytren's disease of palm - right 4th digit flexor tendon     Sun-damaged skin     Seasonal allergic rhinitis     Basal cell carcinoma of leg, right     Adjustment disorder with mixed anxiety and depressed mood- mild-moderate  at this time     Family history of celiac disease     Generalized anxiety disorder     Insomnia, unspecified type     Chronic constipation     Raynaud's phenomenon without gangrene     Other secondary osteoarthritis of left knee     Primary localized  "osteoarthrosis of left lower leg     Hyperlipidemia LDL goal <130     Basal cell carcinoma     Colon polyps     Screening for cervical cancer     Adjustment insomnia     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 - much improved - essentially gone       Drug-induced parkinsonism (H)- Abilify - generic = ARIPiprazole( 5mg)      Mood disorder (H)       Current Outpatient Medications   Medication Sig Dispense Refill     clindamycin (CLINDAGEL) 1 % external gel Apply topically daily 60 g 11     lactobacillus rhamnosus, GG, (CULTURELL) capsule Take 1 capsule by mouth daily Taking every other day       lisinopril (ZESTRIL) 5 MG tablet Takes 1/2 tablet to equal 2.5 mg       propranolol (INDERAL) 10 MG tablet Take 1 tablet (10 mg) by mouth daily 90 tablet 3     propranolol (INDERAL) 20 MG tablet Take 20 mg by mouth At Bedtime       traZODone (DESYREL) 50 MG tablet Take 0.5-1 tablets (25-50 mg) by mouth nightly as needed for sleep 30 tablet 1     venlafaxine (EFFEXOR XR) 150 MG 24 hr capsule Take 150 mg by mouth daily            Allergies   Allergen Reactions     Abilify [Aripiprazole] Other (See Comments)     Suspected drug-induced Parkinsonism      Fosamax Diarrhea     Diarrhea, stomach cramps, jaw pain      Amoxicillin-Pot Clavulanate Itching and Rash     Amoxicillin-Pot Clavulanate Itching and Rash               Review of Systems : Constitutional, HEENT, cardiovascular, pulmonary, GI, , musculoskeletal, neuro, skin, endocrine and psych systems are negative, except as otherwise noted.      Objective    /78   Pulse 76   Temp 98  F (36.7  C) (Tympanic)   Resp 16   Ht 1.715 m (5' 7.5\")   Wt 64.1 kg (141 lb 4.8 oz)   LMP 06/28/2001   SpO2 100%   BMI 21.80 kg/m    Body mass index is 21.8 kg/m .  Physical Exam :   GENERAL: healthy, alert and no distress  EYES: Eyes grossly normal to inspection, PERRL and conjunctivae and sclerae normal  HENT:nose and mouth " without ulcers or lesions  MS: no gross musculoskeletal defects noted, no edema  SKIN: no suspicious lesions or rashes  NEURO: Normal strength and tone, mentation intact and speech normal  PSYCH: mentation appears normal, affect normal/bright  Alert and oriented. No acute distress. Appears well-groomed and casually dressed. Affect is normal, not particularly depressed. In good humor and laughs appropriately. Not particularly anxious. No evidence of psychosis.     Last labs:   Lab on 02/08/2023   Component Date Value Ref Range Status     Cholesterol 02/08/2023 222 (H)  <200 mg/dL Final     Triglycerides 02/08/2023 60  <150 mg/dL Final     Direct Measure HDL 02/08/2023 81  >=50 mg/dL Final     LDL Cholesterol Calculated 02/08/2023 129 (H)  <=100 mg/dL Final     Non HDL Cholesterol 02/08/2023 141 (H)  <130 mg/dL Final     25 OH Vitamin D2 02/08/2023 <5  ug/L Final     25 OH Vitamin D3 02/08/2023 27  ug/L Final     25 OH Vit D Total 02/08/2023 <32  20 - 75 ug/L Final    Season, race, dietary intake, and treatment affect the concentration of 25-hydroxy-Vitamin D. Values may decrease during winter months and increase during summer months. Values 20-29 ug/L may indicate Vitamin D insufficiency and values <20 ug/L may indicate Vitamin D deficiency.     Sodium 02/08/2023 141  136 - 145 mmol/L Final     Potassium 02/08/2023 4.1  3.4 - 5.3 mmol/L Final     Chloride 02/08/2023 103  98 - 107 mmol/L Final     Carbon Dioxide (CO2) 02/08/2023 25  22 - 29 mmol/L Final     Anion Gap 02/08/2023 13  7 - 15 mmol/L Final     Urea Nitrogen 02/08/2023 16.2  8.0 - 23.0 mg/dL Final     Creatinine 02/08/2023 0.62  0.51 - 0.95 mg/dL Final     Calcium 02/08/2023 9.7  8.8 - 10.2 mg/dL Final     Glucose 02/08/2023 95  70 - 99 mg/dL Final     Alkaline Phosphatase 02/08/2023 73  35 - 104 U/L Final     AST 02/08/2023 22  10 - 35 U/L Final     ALT 02/08/2023 19  10 - 35 U/L Final     Protein Total 02/08/2023 6.9  6.4 - 8.3 g/dL Final     Albumin  02/08/2023 4.5  3.5 - 5.2 g/dL Final     Bilirubin Total 02/08/2023 0.5  <=1.2 mg/dL Final     GFR Estimate 02/08/2023 >90  >60 mL/min/1.73m2 Final    eGFR calculated using 2021 CKD-EPI equation.     TSH 02/08/2023 1.65  0.30 - 4.20 uIU/mL Final     WBC Count 02/08/2023 4.8  4.0 - 11.0 10e3/uL Final     RBC Count 02/08/2023 4.46  3.80 - 5.20 10e6/uL Final     Hemoglobin 02/08/2023 13.4  11.7 - 15.7 g/dL Final     Hematocrit 02/08/2023 40.4  35.0 - 47.0 % Final     MCV 02/08/2023 91  78 - 100 fL Final     MCH 02/08/2023 30.0  26.5 - 33.0 pg Final     MCHC 02/08/2023 33.2  31.5 - 36.5 g/dL Final     RDW 02/08/2023 13.0  10.0 - 15.0 % Final     Platelet Count 02/08/2023 261  150 - 450 10e3/uL Final

## 2023-05-08 NOTE — PROGRESS NOTES
[facility]  May 11, 2023      Behavioral Health Clinician Progress Note    Patient Name: Jolynn Haji           Service Type:  Individual      Service Location:   MyChart / Email (patient reached)     Session Start Time: 1:00pm   Session End Time: 1:30 pm      Session Length: 16 - 37      Attendees: Patient     Service Modality:  Video Visit:      Provider verified identity through the following two step process.  Patient provided:  Patient is known previously to provider    Telemedicine Visit: The patient's condition can be safely assessed and treated via synchronous audio and visual telemedicine encounter.      Reason for Telemedicine Visit: Patient has requested telehealth visit    Originating Site (Patient Location): Patient's home    Distant Site (Provider Location): [facility]    Consent:  The patient/guardian has verbally consented to: the potential risks and benefits of telemedicine (video visit) versus in person care; bill my insurance or make self-payment for services provided; and responsibility for payment of non-covered services.     Patient would like the video invitation sent by:  My Chart    Mode of Communication:  Video Conference via [employer]well    As the provider I attest to compliance with applicable laws and regulations related to telemedicine.    Visit Activities (Refresh list every visit): [facility] Only    Diagnostic Assessment Date: 9/27/22  Treatment Plan Review Date: 7/27/23  See Flowsheets for today's PHQ-9 and TIFFANY-7 results  Previous PHQ-9:       3/23/2023     5:30 PM 4/26/2023    11:16 AM 5/3/2023     1:18 PM   PHQ-9 SCORE   PHQ-9 Total Score MyChart 1 (Minimal depression) 1 (Minimal depression) 1 (Minimal depression)   PHQ-9 Total Score 1 1 1     Previous TIFFANY-7:       3/21/2023    11:20 AM 3/23/2023     5:33 PM 5/3/2023     1:18 PM   TIFFANY-7 SCORE   Total Score 1 (minimal anxiety) 5 (mild anxiety) 3 (minimal  anxiety)   Total Score 1 5 3       KARINA LEVEL:      8/27/2010     3:00 PM 2/22/2011    11:00 AM   KARINA Score (Last Two)   KARINA Raw Score 51 49   Activation Score 91.6 82.8   KARINA Level 4 4       DATA  Extended Session (60+ minutes): No  Interactive Complexity: No  Crisis: No  [facility] Patient: No    Treatment Objective(s) Addressed in This Session:  Target Behavior(s): anxiety and depression    Depressed Mood: Increase interest, engagement, and pleasure in doing things  Decrease frequency and intensity of feeling down, depressed, hopeless  Improve quantity and quality of night time sleep / decrease daytime naps  Feel less tired and more energy during the day   Improve diet, appetite, mindful eating, and / or meal planning  Identify negative self-talk and behaviors: challenge core beliefs, myths, and actions  Improve concentration, focus, and mindfulness in daily activities   Feel less fidgety, restless or slow in daily activities / interpersonal interactions  Anxiety: will experience a reduction in anxiety, will develop more effective coping skills to manage anxiety symptoms, will develop healthy cognitive patterns and beliefs and will increase ability to function adaptively    Current Stressors / Issues:   update: Reports feeling more clear headed on trazodone and off Ambien. She is taking half the pill at night. Headaches have been less and less sever. No change in sleep. She has been more active in doing things she would have done over a year ago. Not stressing about the cabin remodel. She has been spending time with grandchildren. Notes family has really noticed difference in her mental health. Reports reading recommended books by [facility]. Going to see friend struggling with mental health.   [place]  Appetite: unremarkable  Sleep: unremarkable  Therapy: weekly therapy  FARIDA: na  Preg: na  Most important: Does it matter if she alters the dose of trazodone from day to day.     5/1/23  Reports [place]. Feels things are progressing well. Discussed switching medication for sleep to Trazodone. She has been sleeping the same on Ambien. She is taking a half pill and not having any grogginess. She has been engaged with friends and [employer]. Recommended Gifts of Imperfections and I Thought It Was Just Me by Darrick Arteaga. Also Decluttering Your Mind by CAT Melgar. Discussed having others notice positive changes in her. She struggles with having that spot light on her.       [facility]  MH update: doing well with heightened anxiety but not panic attacks.   Stresses: [place]   Appetite: unremarkable  Sleep: unremarkable  Therapy: seeing therapist weekly, reports sister came for several days that was great. Making decision about traveling for family wedding.   FARIDA: na  Preg: na  Most important: continues to have headaches daily. Wants to discuss Ambien with MD/PCP suggested getting off completely. Would like to have Propanol for 10 mg to not have to cut.      4/19/23  Reports managing her anxiety around renovation of [employer] bathroom has been difficult. Discussed desire for control and letting go.Reports sister is coming for several days.  is having surgery tomorrow. She has been walking and exercising more.      4/12/23  Reports it she is doing well and had a great Easter with family. Discussed more about her anxiety (ABC), being self blaming (what ifs/should have) at nature but at other times unknown source. Discussed eliminating \"should' for vocabulary due to shame, self judgement, and regret associated with the word. She will continue to work on ABC and look at the use of should.     3/31/23  Reports she is doing well. Anxiety has been pretty good but has headaches when anxious. Looking forward to going to theater with grandchildren. Medications have been very helpful. Discussed ABC of Anxiety. Discussed events of anxiety at its worse. Discussed the beliefs she has about " "anxiety events. Discussed consequences of event.     3/23/23 [facility]  MH update: Reports anxiety has decreased a lot on the propanol. Continues to have headaches but only about half the days. She has noticed a stronger correlation with headaches and anxiety. She completed Neuro Psych testing. Relieved she has no new dx. Planning on walking with dog. She is feeling more like her self, \"5 star days.\"    Stresses: Neuro psych testing and going to wake alone was stressful. Felt she managed very well.   Appetite: unremarkable  Sleep: unremarkable  Therapy: Continues to do weekly therapy. [place] she worried a lot about testing and did not , lesson in not worrying about things that have not happened yet. Feeling very accomplished after attending a wake alone without her . She stated it went very well and anxiety was lower than normal and then expected. Discussed decision making in the moment. Discussed it can be difficult to make decisions in the moment. Continues with coping skills, ie meditation and breathing tch.   FARIDA: na  Preg: na  Most important: Wants to address doses of medications.     3/14/23  Feels the propanol has been really helping with anxiety. She has been feeling like her self for the past 5 days. Continues to have several headaches. She is considering going back to yoga after looking as some neck stretches.     [facility]  She has been more anxious on the different dosing of medications. She has also not noticed decrease in headaches. Discussed neck tension and using body scanning. She continues to feel a bit foggy in the afternoons.     3/3/23  Reports doing well. States she has been in communication with MD about headaches. Has noticed on days when she has lower dose of med she has more anxiety. She has been examining her anxiety when going out socially. Does not feel it is associated with the socializing itself but rather \"will the anxiety come up.\" Discussed upcoming appt for neuro psych " "testing. Is hesitant but willing to do testing. May want to talk to MD about it prior appt. Fearful about Drug Induced Parkinson's.       2/17/23  Pt reports she is doing well. Reports anxiety has been better. Able to control it better with accepting and reognition of anxiety. Continues to utilize meditation and review of skills taught in therapy. Ice packs has been helping with headaches. Self talk has much improved and has gained more confidence. Anxiety in the evening have improved but still feeling the anxiety at times. She has been wanting to work exercise into her daily routine. Sleep has been also improve. ID values: umm, family, self respect responsibility and kindness.       2/10/23  Pt reports she had such a good time with her trip to her sisters house. Much reduced anxious than expected (compared to home) and sleep was really good. Discussed implementing things that worked at sisters for home. Continues to have some anxiety when going into store or before events. Reports panic attacks are less than previous week. Discussed triggers for anxiety. Discussed do expectations of self meet values.  ID values from next time and look at expectations for self. Pt asked \"What could I do better to be good enough\". Struggles with being good enough for self. Talked about list of things she wants to achieve in order to feel \"good enough\". One being able to go to places with out wondering if she will be anxious.     Identified Triggers for anxiety and background thought behind it:  More anxious when tired: less bandwidth to utilize skills  Feeling going to be late  Making quick decisions  Technology   organizing big meal for company: what people expect of her  MD visits or waiting for visit: anticipations of events at MD, has not had poor experiences, husbands medical experience   What if something happens to Zach   Packing groceries and people are behind her  Sometimes going out to eat (much better): menu with too " many options and having to make decisions  Holiday: anticipation and wanting it to be perfect for people/what ng people expect of her  Dealing with insurance problem: hassle and being out of her control   Being in the unknown about things: Slowness of recovery from anxiety, what ifs  Do people see I am anxious  Large crowd of people: leading up to and during (highest), people might be noticing her, dont want to stand   *Things that need to be done but are not done(floor needing to be washed, getting decorations down etc): likes list to check off (what to accomplish that week), Not accomplishing goal. Self placed expectations (high standards from growing up), perfectionism (mom perfectionism as a child), some self comparisons to others, taking on too much during a week, and expecting to be like old self all the time (compare self to old self).     2/1/23  Pt is reporting she is doing well. She has been out driving to do shopping and seeing friends. Continues to have improvement with anxiety each day. Continues to have headaches but is wanting to change medications until she gets back from trip to her sisters home. Having some anxiety about spending over night for the first time since COVID. Discussed steps to addressing anxiety at her sisters home and drive up. 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 Became dizzy and become worried about the physical sx she was experiencing. Anxiety grew from that point on. Discussed how she handled it in the moment with redirecting thoughts, accepting anxiety and attempting some of the anxiety tech discussed. Pt will explore other situations in which anxiety tends to arise for next session. Pt has been keeping a journal of her daily experience and will reexamine journal to better understand anxiety. Pt listed several situations she knows brings anxiety.      Identified Triggers:  Feeling rushed  Not wanting to be late  Having others do  responsibilities she once carried as her responsibility  Being flexible  Expectations of others  What do others see/think of her when experiencing panic attack    1/23/23  Reports she is doing really well. Reports she will be driving up to see her grandson. She is highly looking forward to this and making valentines cards. Some anxiety with going in to the grocery store but not once she gets started shopping. Reports her ultimate goal would be to go to the mall alone with traffic as that has been causing anxiety. Headaches continue to be of concern to her. Discussed further and identified them coming following either a panic attack or after high anxiety. Discussed using sensory tech for elevating the anxiety and also for following headache such as heating pad for neck or eyes. Discussed her conversation with Dr. Pompa about possibly changing meds to help with headaches. She reports relishing in current progress and is not wanting meds to change at this time. Headaches do interfere sometimes with daily life. She will be going to lunch with several college friends. This has caused a bit of anxiety in accepting the invite. Normalized experience for pt.       Progress on Treatment Objective(s) / Homework:  Satisfactory progress - ACTION (Actively working towards change); Intervened by reinforcing change plan / affirming steps taken    Motivational Interviewing    MI Intervention: Expressed Empathy/Understanding, Supported Autonomy, Collaboration, Evocation, Open-ended questions, Change talk (evoked) and Reframe     Change Talk Expressed by the Patient: Desire to change Ability to change Reasons to change Need to change    Provider Response to Change Talk: E - Evoked more info from patient about behavior change, A - Affirmed patient's thoughts, decisions, or attempts at behavior change, R - Reflected patient's change talk and S - Summarized patient's change talk statements      Care Plan review completed:  Yes    Medication Review:  No changes to current psychiatric medication(s)    Medication Compliance:  Yes    Changes in Health Issues:   None reported    Chemical Use Review:   Substance Use: Chemical use reviewed, no active concerns identified      Tobacco Use: No current tobacco use.      Assessment: Current Emotional / Mental Status (status of significant symptoms):  Risk status (Self / Other harm or suicidal ideation)  Patient denies a history of suicidal ideation, suicide attempts, self-injurious behavior, homicidal ideation, homicidal behavior and and other safety concerns  Patient denies current fears or concerns for personal safety.  Patient denies current or recent suicidal ideation or behaviors.  Patient denies current or recent homicidal ideation or behaviors.  Patient denies current or recent self injurious behavior or ideation.  Patient denies other safety concerns.  A safety and risk management plan has not been developed at this time, however patient was encouraged to call [place] [phone] should there be a change in any of these risk factors.    Appearance:   Appropriate   Eye Contact:   Good   Psychomotor Behavior: Normal   Attitude:   Cooperative   Orientation:   All  Speech   Rate / Production: Normal    Volume:  Normal   Mood:    Anxious   Affect:    Appropriate   Thought Content:  Clear   Thought Form:  Coherent  Logical   Insight:    Good     Diagnoses:  1. Generalized anxiety disorder    2. Insomnia, unspecified type    3. Mood disorder (H)        Collateral Reports Completed:  Not Applicable    Plan: (Homework, other):  Patient was given information about behavioral services and encouraged to schedule a follow up appointment with the clinic [facility] in 1 week.  She was also given information about mental health symptoms and treatment options .  CD Recommendations: No indications of CD issues.  Erika STARK  LICSW      ______________________________________________________________________    Integrated Primary Care Behavioral Health Treatment Plan    Patient's Name: Jolynn Haji  YOB: 1955    Date of Creation: 10/5/22  Date Treatment Plan Last Reviewed/Revised: 4/27/23    DSM5 Diagnoses: 300.02 (F41.1) Generalized Anxiety Disorder  Psychosocial / Contextual Factors: Individual Factors high anxiety associated with depressive sx, interfereing with ability to function as she would like.  .  PROMIS (reviewed every 90 days):     Referral / Collaboration:  Was/were discussed and patient will pursue. - individual therapy    Anticipated number of session for this episode of care: 5-8 sessions  Anticipation frequency of session: Weekly  Anticipated Duration of each session: 38-52 minutes  Treatment plan will be reviewed in 90 days or when goals have been changed.       MeasurableTreatment Goal(s) related to diagnosis / functional impairment(s)  Goal 1: Patient will work with provider to manage symptoms    I will know I've met my goal when when less anxious and feeling down.      Objective #A (Patient Action)    Patient will  attend all sessions.  Status: Continued - Date(s): 4/27/23    Intervention(s)  Therapist will teach educate patient on treatment options, clarify concerns, work with pt to overcome any resistance to compliance..    Objective #B  Patient will identify 3 fears / thoughts that contribute to feeling anxious.  Status: Continued - Date(s): 4/27/23     Intervention(s)  Therapist will educate patient on treatment options, clarify concerns, work with pt to overcome any resistance to compliance..    Objective #C  Patient will identify 3 initial signs or symptoms of anxiety.  Status: Continued - Date(s): 4/27/23     Intervention(s)  Therapist will educate patient on treatment options, clarify concerns, work with pt to overcome any resistance to compliance..        Patient has reviewed and agreed to the  above plan.      Erika [facility]  May 11, 2023  Answers for HPI/ROS submitted by the patient on 12/6/2022  TIFFANY 7 TOTAL SCORE: 3  Answers for HPI/ROS submitted by the patient on 3/8/2023  If you checked off any problems, how difficult have these problems made it for you to do your work, take care of things at home, or get along with other people?: Not difficult at all  PHQ9 TOTAL SCORE: 5  Answers for HPI/ROS submitted by the patient on 4/26/2023  If you checked off any problems, how difficult have these problems made it for you to do your work, take care of things at home, or get along with other people?: Somewhat difficult  PHQ9 TOTAL SCORE: 1

## 2023-05-11 ENCOUNTER — VIRTUAL VISIT (OUTPATIENT)
Dept: BEHAVIORAL HEALTH | Facility: CLINIC | Age: 68
End: 2023-05-11
Payer: COMMERCIAL

## 2023-05-11 ENCOUNTER — VIRTUAL VISIT (OUTPATIENT)
Dept: PSYCHIATRY | Facility: CLINIC | Age: 68
End: 2023-05-11
Payer: COMMERCIAL

## 2023-05-11 DIAGNOSIS — F39 MOOD DISORDER (H): ICD-10-CM

## 2023-05-11 DIAGNOSIS — G47.00 INSOMNIA, UNSPECIFIED TYPE: ICD-10-CM

## 2023-05-11 DIAGNOSIS — R25.1 TREMOR: ICD-10-CM

## 2023-05-11 DIAGNOSIS — G21.19 DRUG-INDUCED PARKINSONISM (H): ICD-10-CM

## 2023-05-11 DIAGNOSIS — F41.1 GAD (GENERALIZED ANXIETY DISORDER): Primary | ICD-10-CM

## 2023-05-11 DIAGNOSIS — Z87.820 HISTORY OF TRAUMATIC BRAIN INJURY: ICD-10-CM

## 2023-05-11 DIAGNOSIS — F41.1 GENERALIZED ANXIETY DISORDER: Primary | ICD-10-CM

## 2023-05-11 PROCEDURE — 90832 PSYTX W PT 30 MINUTES: CPT | Mod: VID | Performed by: SOCIAL WORKER

## 2023-05-11 PROCEDURE — 99214 OFFICE O/P EST MOD 30 MIN: CPT | Mod: VID | Performed by: PSYCHIATRY & NEUROLOGY

## 2023-05-11 RX ORDER — VENLAFAXINE HYDROCHLORIDE 150 MG/1
150 CAPSULE, EXTENDED RELEASE ORAL DAILY
Qty: 90 CAPSULE | Refills: 1 | Status: SHIPPED | OUTPATIENT
Start: 2023-05-11 | End: 2023-07-11 | Stop reason: DRUGHIGH

## 2023-05-11 RX ORDER — TRAZODONE HYDROCHLORIDE 50 MG/1
25-50 TABLET, FILM COATED ORAL
Qty: 90 TABLET | Refills: 1 | Status: SHIPPED | OUTPATIENT
Start: 2023-05-11 | End: 2023-10-10

## 2023-05-11 RX ORDER — PROPRANOLOL HYDROCHLORIDE 20 MG/1
20 TABLET ORAL 2 TIMES DAILY
Qty: 180 TABLET | Refills: 1 | Status: SHIPPED | OUTPATIENT
Start: 2023-05-11 | End: 2023-09-27

## 2023-05-11 NOTE — PATIENT INSTRUCTIONS
Treatment Plan:   Continue trazodone 25-50 mg at bedtime as needed for sleep  Continue Effexor-XR/venlafaxine  mg daily for depression and anxiety.   Continue propranolol 20 mg twice daily for anxiety, tremor, headaches.  Continue to follow with neurology as they recommend.    Continue all other cares per primary care provider.   Continue all other medications as reviewed per electronic medical record today.   Safety plan reviewed. To the Emergency Department as needed or call after hours crisis line at [phone] or [phone]. [place] Crisis Text Line. Text [place] to 184818 or Suicide LifeLine Chat: suicidepreventionlifeline.org/chat  Continue individual psychotherapy for additional support and ongoing development of nonpharmacologic coping skills and strategies.  Schedule an appointment with me in 2 months or sooner as needed. Call [facility] at [phone] to schedule.  Follow up with primary care provider as planned or for acute medical concerns.  Call the psychiatric nurse line with medication questions or concerns at [phone].  [employer] may be used to communicate with your provider, but this is not intended to be used for emergencies.  Patient Education   Collaborative Care Psychiatry Service  What to Expect  Here's what to expect from your Collaborative Care Psychiatry Service (CCPS).   About CCPS  CCPS means 2 people work together to help you get better. You'll meet with a behavioral health clinician and a psychiatric doctor. A behavioral health clinician helps people with mental health problems by talking with them. A psychiatric doctor helps people by giving them medicine.  How it works  At every visit, you'll see the behavioral health clinician (BHC) first. They'll talk with you about how you're doing and teach you how to feel better.   Then you'll see the psychiatric doctor. This doctor can help you deal with troubling thoughts and feelings by giving you medicine. They'll  "make sure you know the plan for your care.   CCPS usually takes 3 to 6 visits. If you need more visits, we may have you start seeing a different psychiatric doctor for ongoing care.  If you have any questions or concerns, we'll be glad to talk with you.  About visits  Be open  At your visits, please talk openly about your problems. It may feel hard, but it's the best way for us to help you.  Cancelling visits  If you can't come to your visit, please call us right away at [phone]. If you don't cancel at least 24 hours (1 full day) before your visit, that's \"late cancellation.\"  Being late to visits  Being very late is the same as not showing up. You will be a \"no show\" if:  Your appointment starts with a C, and you're more than 15 minutes late for a 30-minute (half hour) visit. This will also cancel your appointment with the psychiatric doctor.  Your appointment is with a psychiatric doctor only, and you're more than 15 minutes late for a 30-minute (half hour) visit.  Your appointment is with a psychiatric doctor only, and you're more than 30 minutes late for a 60-minute (full hour) visit.  If you cancel late or don't show up 2 times within 6 months, we may end your care.   Getting help between visits  If you need help between visits, you can call us Monday to Friday from 8 a.m. to 4:30 p.m. at [phone].  Emergency care  Call 911 or go to the nearest emergency department if your life or someone else's life is in danger.  Call 988 anytime to reach the national Suicide and Crisis hotline.  Medicine refills  To refill your medicine, call your pharmacy. You can also call [facility]'s Behavioral Access at [phone], Monday to Friday, 8 a.m. to 4:30 p.m. It can take 1 to 3 business days to get a refill.   Forms, letters, and tests  You may have papers to fill out, like FMLA, short-term disability, and workability. We can help you with these forms at your visits, but you must have an appointment. " You may need more than 1 visit for this, to be in an intensive therapy program, or both.  Before we can give you medicine for ADHD, we may refer you to get tested for it or confirm it another way.  We may not be able to give you an emotional support animal letter.  We don't do mental health checks ordered by the court.   We don't do mental health testing, but we can refer you to get tested.   Thank you for choosing us for your care.  For informational purposes only. Not to replace the advice of your health care provider. Copyright   2022 [facility]. All rights reserved. [employer] [id number] - 12/22.

## 2023-05-11 NOTE — PROGRESS NOTES
"Telemedicine Visit: The patient's condition can be safely assessed and treated via synchronous audio and visual telemedicine encounter.      Reason for Telemedicine Visit: Patient has requested telehealth visit    Originating Site (Patient Location): Patient's home    Distant Location (provider location):  Off-site    Consent:  The patient/guardian has verbally consented to: the potential risks and benefits of telemedicine (video visit) versus in person care; bill my insurance or make self-payment for services provided; and responsibility for payment of non-covered services.     Mode of Communication:  Video Conference via LOGIC DEVICES    As the provider I attest to compliance with applicable laws and regulations related to telemedicine.        Outpatient Psychiatric Progress Note    Name: Jolynn Haji   : 1955                    Primary Care Provider: Helga Ibarra MD   Therapist: Working with DINESH Whipple    PHQ-9 scores:      3/23/2023     5:30 PM 2023    11:16 AM 5/3/2023     1:18 PM   PHQ-9 SCORE   PHQ-9 Total Score MyChart 1 (Minimal depression) 1 (Minimal depression) 1 (Minimal depression)   PHQ-9 Total Score 1 1 1       TIFFANY-7 scores:      3/21/2023    11:20 AM 3/23/2023     5:33 PM 5/3/2023     1:18 PM   TIFFANY-7 SCORE   Total Score 1 (minimal anxiety) 5 (mild anxiety) 3 (minimal anxiety)   Total Score 1 5 3       Patient Identification:  Patient is a 67 year old,   White Choose not to answer female  who presents for return visit with me.  Patient is currently retired. Patient attended the phone/video session with her daughter aMlini rucker. Patient prefers to be called: \" Azalea\".    Interim History:  I last saw Jolynn Haji for outpatient psychiatry return visit on 2023. During that appointment, we:     Continue Ambien CR 6.25 mg at bedtime as needed for sleep. Do not take with alcohol or opioid pain medication. Make sure to have 8-10 hours to devote to sleep after taking. " "Careful/caution for falls in middle of night. Discussed sleep medicine referral previously but patient has declined-could consider in future.     Continue Effexor-XR/venlafaxine  mg daily for depression and anxiety.     Increase propranolol to 20 mg twice daily for anxiety, tremor, headaches.    Continue to follow with neurology as they recommend.      Continue all other cares per primary care provider.     5/11: Patient overall doing very well.  Still some mild headaches but much improved since stopping Ambien and going back up on propranolol to 20 mg twice daily.  No acute safety concerns.  No SI.  No problematic drug or alcohol use    Per [facility], DINESH Whipple, during today's team-based visit:  MH update: Reports feeling more clear headed on trazodone and off Ambien. She is taking half the pill. Headaches have been less and less sever. No change in sleep. She has been more active in doing things she would have done over a year ago. Not stressing about the [employer] remod[employer]. She has been spending time with grandchildren. Notes family has really noticed difference in her. Reports reading recommended books by [facility]. Going to see friend struggling with mental health.   [place]  Appetite: unremarkable  Sleep: unremarkable  Therapy: weekly therapy  FARIDA: na  Preg: na  Most important: Does it matter if she alters the dose of trazodone.     Past Psychiatric Med Trials:  Psych Meds at Intake:  lexapro 20 mg daily  abilify 5 mg daily      Past Psych Meds:  Amitriptyline  Quetiapine  Mirtazapine - stopped \"because I was sleeping through the night\"    Psychiatric ROS:  Jolynn Haji reports mood has been: Stable  Anxiety has been: Improved/stable, see HPI above  Sleep has been: Stable, trazodone working well  Seema sxs: None  Psychosis sxs: N/A  ADHD/ADD sxs: N/A  PTSD sxs: N/A  PHQ9 and GAD7 scores were reviewed today if completed.   Medication side effects: Denies propranolol ASEs; possibly headaches and " fatigue from venlafaxine  Current stressors include: Symptoms and See HPI above  Coping mechanisms and supports include: Family, Hobbies and Friends    Current medications include:   Current Outpatient Medications   Medication Sig     propranolol (INDERAL) 20 MG tablet Take 1 tablet (20 mg) by mouth 2 times daily     traZODone (DESYREL) 50 MG tablet Take 0.5-1 tablets (25-50 mg) by mouth nightly as needed for sleep     venlafaxine (EFFEXOR XR) 150 MG 24 hr capsule Take 1 capsule (150 mg) by mouth daily     clindamycin (CLINDAGEL) 1 % external gel Apply topically daily     lactobacillus rhamnosus, GG, (CULTURELL) capsule Take 1 capsule by mouth daily Taking every other day     lisinopril (ZESTRIL) 5 MG tablet Takes 1/2 tablet to equal 2.5 mg     Current Facility-Administered Medications   Medication     lidocaine 1 % 4 mL     lidocaine 1 % injection 3 mL     lidocaine 1 % injection 4 mL     triamcinolone (KENALOG-40) injection 40 mg       Past Medical/Surgical History:  Past Medical History:   Diagnosis Date     Abnormal glandular Papanicolaou smear of cervix- ASCUS in 2000 12/5/2003    ASC H- 2000     Basal cell carcinoma of leg, right 05/2016    Dr Rogers     Colon polyps 05/2018    tubular adenoam - due 5 yrs - 2 mm     Concussion without loss of consciousness 4/12/2017     Hidradenitis     left  groin     HSIL (high grade squamous intraepithelial lesion) on Pap smear of cervix 7/5/2000    Normal paps from 2001 to 2016/cc     Hypertension goal BP (blood pressure) < 140/90      OA (osteoarthritis) of knee     moderate     Postmenopausal since 7/2008      Shingles 7/23/2010    left      Squamous cell carcinoma of skin, unspecified      Synovial cyst of popliteal space       has a past medical history of Abnormal glandular Papanicolaou smear of cervix- ASCUS in 2000 (12/5/2003), Basal cell carcinoma of leg, right (05/2016), Colon polyps (05/2018), Concussion without loss of consciousness (4/12/2017), Hidradenitis,  HSIL (high grade squamous intraepithelial lesion) on Pap smear of cervix (7/5/2000), Hypertension goal BP (blood pressure) < 140/90, OA (osteoarthritis) of knee, Postmenopausal (since 7/2008 ), Shingles (7/23/2010), Squamous cell carcinoma of skin, unspecified, and Synovial cyst of popliteal space.    She has no past medical history of Malignant melanoma (H) or Squamous cell carcinoma.    Social History:  Reviewed. No changes to social history except as noted above in HPI.    Vital Signs:   None. This is phone/video visit.     Labs:  Most recent laboratory results reviewed and the pertinent results include:   TSH   Date Value Ref Range Status   02/08/2023 1.65 0.30 - 4.20 uIU/mL Final   04/25/2022 1.30 0.40 - 4.00 mU/L Final   01/13/2021 2.07 0.40 - 4.00 mU/L Final     Lab Results   Component Value Date    WBC 8.1 04/25/2022    WBC 6.4 01/13/2021     Lab Results   Component Value Date    RBC 4.58 04/25/2022    RBC 4.54 01/13/2021     Lab Results   Component Value Date    HGB 13.6 04/25/2022    HGB 13.3 01/13/2021     Lab Results   Component Value Date    HCT 41.1 04/25/2022    HCT 41.7 01/13/2021     No components found for: MCT  Lab Results   Component Value Date    MCV 90 04/25/2022    MCV 92 01/13/2021     Lab Results   Component Value Date    MCH 29.7 04/25/2022    MCH 29.3 01/13/2021     Lab Results   Component Value Date    MCHC 33.1 04/25/2022    MCHC 31.9 01/13/2021     Lab Results   Component Value Date    RDW 12.9 04/25/2022    RDW 13.1 01/13/2021     Lab Results   Component Value Date     04/25/2022     01/13/2021     Last Comprehensive Metabolic Panel:  Sodium   Date Value Ref Range Status   02/08/2023 141 136 - 145 mmol/L Final   01/13/2021 140 133 - 144 mmol/L Final     Potassium   Date Value Ref Range Status   02/08/2023 4.1 3.4 - 5.3 mmol/L Final   04/25/2022 4.4 3.4 - 5.3 mmol/L Final   01/13/2021 4.1 3.4 - 5.3 mmol/L Final     Chloride   Date Value Ref Range Status   02/08/2023 103 98  - 107 mmol/L Final   04/25/2022 105 94 - 109 mmol/L Final   01/13/2021 108 94 - 109 mmol/L Final     Carbon Dioxide   Date Value Ref Range Status   01/13/2021 28 20 - 32 mmol/L Final     Carbon Dioxide (CO2)   Date Value Ref Range Status   02/08/2023 25 22 - 29 mmol/L Final   04/25/2022 26 20 - 32 mmol/L Final     Anion Gap   Date Value Ref Range Status   02/08/2023 13 7 - 15 mmol/L Final   04/25/2022 6 3 - 14 mmol/L Final   01/13/2021 4 3 - 14 mmol/L Final     Glucose   Date Value Ref Range Status   02/08/2023 95 70 - 99 mg/dL Final   04/25/2022 104 (H) 70 - 99 mg/dL Final   01/13/2021 91 70 - 99 mg/dL Final     Comment:     Fasting specimen     Urea Nitrogen   Date Value Ref Range Status   02/08/2023 16.2 8.0 - 23.0 mg/dL Final   04/25/2022 23 7 - 30 mg/dL Final   01/13/2021 18 7 - 30 mg/dL Final     Creatinine   Date Value Ref Range Status   02/08/2023 0.62 0.51 - 0.95 mg/dL Final   01/13/2021 0.68 0.52 - 1.04 mg/dL Final     GFR Estimate   Date Value Ref Range Status   02/08/2023 >90 >60 mL/min/1.73m2 Final     Comment:     eGFR calculated using 2021 CKD-EPI equation.   01/13/2021 >90 >60 mL/min/[1.73_m2] Final     Comment:     Non  GFR Calc  Starting 12/18/2018, serum creatinine based estimated GFR (eGFR) will be   calculated using the Chronic Kidney Disease Epidemiology Collaboration   (CKD-EPI) equation.       Calcium   Date Value Ref Range Status   02/08/2023 9.7 8.8 - 10.2 mg/dL Final   01/13/2021 8.8 8.5 - 10.1 mg/dL Final     Bilirubin Total   Date Value Ref Range Status   02/08/2023 0.5 <=1.2 mg/dL Final   01/13/2021 0.8 0.2 - 1.3 mg/dL Final     Alkaline Phosphatase   Date Value Ref Range Status   02/08/2023 73 35 - 104 U/L Final   01/13/2021 72 40 - 150 U/L Final     ALT   Date Value Ref Range Status   02/08/2023 19 10 - 35 U/L Final   01/13/2021 17 0 - 50 U/L Final     AST   Date Value Ref Range Status   02/08/2023 22 10 - 35 U/L Final   01/13/2021 13 0 - 45 U/L Final     Review of  Systems:  10 systems (general, cardiovascular, respiratory, eyes, ENT, endocrine, GI, , M/S, neurological) were reviewed. Most pertinent finding(s) is/are: Some chronic pains, see HPI above. The remaining systems are all unremarkable.    Mental Status Examination (limited as this is by phone/video):  Appearance: Awake, alert, appears stated age, no acute distress, well-groomed   Attitude:  cooperative, pleasant   Motor: No gross abnormalities noted today.  Not formally tested  Oriented to:  person, place, time, and situation  Attention Span and Concentration:  normal  Speech: Normal volume, normal rate, normal rhythm, coherent, no longer very delayed, no longer very hesitant  Language: intact  Mood: Really good  Affect: Overall appropriate and mood congruent  Associations:  no loose associations  Thought Process:  logical, linear and goal oriented  Thought Content:  no evidence of suicidal ideation or homicidal ideation, no evidence of psychotic thought, no auditory hallucinations present and no visual hallucinations present  Recent and Remote Memory: Intact to interview.  Not formally assessed.   Fund of Knowledge: appropriate  Insight: Good  Judgment:  intact, adequate for safety  Impulse Control:  intact    Suicide Risk Assessment:  Today Jolynn aHji reports no suicidal ideation. Based on all available evidence including the factors cited above, Jolynn Haji does not appear to be at imminent risk for self-harm, does not meet criteria for a 72-hr hold, and therefore remains appropriate for ongoing outpatient level of care.  A thorough assessment of risk factors related to suicide and self-harm have been reviewed and are noted above. The patient convincingly denies suicidality on several occasions. Local community safety resources reviewed for patient to use if needed. There was no deceit detected, and the patient presented in a manner that was believable.     DSM5 Diagnosis:  300.02 (F41.1) Generalized Anxiety  Disorder   R/O Mood Disorder  Insomnia, unspecified  H/O TBI    Suspected drug-induced parkinsonism - in remission  R/O Underlying Parkinson's Disease (pt working with neurology)    Medical comorbidities include:   Patient Active Problem List    Diagnosis Date Noted     Essential hypertension with goal blood pressure less than 140/90 07/16/2001     Priority: High     Drug-induced parkinsonism (H)- Abilify - generic = ARIPiprazole( 5mg)  01/02/2023     Priority: Medium     Mood disorder (H) 01/02/2023     Priority: Medium     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 - much improved - essentially gone   07/07/2022     Priority: Medium     Adjustment insomnia 12/23/2021     Priority: Medium     Screening for cervical cancer      Priority: Medium     1999 NIL  2000 ASCUS-H.  >> Colpo: Neg  2001 NIL   2003 NIL pap, Neg HPV  [id number], 2016  NIL pap  7/25/19 NIL pap, Neg HPV    Criteria necessary to stop screening per ASCCP guidelines:  Older than 65 years  Three consecutive negative cytology results or two consecutive negative cotest results within the previous 10 years, with the most recent being performed within the last 5 years.   (Women with hx of MADDIE 2 or 3, or adenocarcinoma in situ should continue screening for full 20 years, even if this goes past age 65).         Hyperlipidemia LDL goal <130 11/30/2018     Priority: Medium     Basal cell carcinoma      Priority: Medium     Colon polyps 05/01/2018     Priority: Medium     Primary localized osteoarthrosis of left lower leg 02/19/2018     Priority: Medium     Other secondary osteoarthritis of left knee 01/29/2018     Priority: Medium     Chronic constipation 09/14/2017     Priority: Medium     Raynaud's phenomenon without gangrene 09/14/2017     Priority: Medium     Insomnia, unspecified type 07/25/2017     Priority: Medium     Generalized anxiety disorder 05/23/2017     Priority: Medium     Adjustment  disorder with mixed anxiety and depressed mood- mild-moderate  at this time 05/11/2017     Priority: Medium     Family history of celiac disease 05/11/2017     Priority: Medium     Basal cell carcinoma of leg, right 05/01/2016     Priority: Medium     Sun-damaged skin 04/21/2016     Priority: Medium     Seasonal allergic rhinitis 04/21/2016     Priority: Medium     Dupuytren's disease of palm - right 4th digit flexor tendon 06/11/2015     Priority: Medium     History of migraine headaches- assoc. with nausea/vomiting - resolved around menopause - were  premenstrual  01/08/2014     Priority: Medium     Rosacea 05/01/2013     Priority: Medium     Numbness of feet- distal feet R>L  02/21/2011     Priority: Medium     NUMBNESS AND TINGLING OF FOOT - bilateral -mild  08/25/2010     Priority: Medium     Osteopenia 01/02/2009     Priority: Medium     Lipidosis 10/24/2006     Priority: Medium     Problem list name updated by automated process. Provider to review       Localized osteoarthritis of hand 11/01/2005     Priority: Medium     Problem list name updated by automated process. Provider to review       Stress incontinence, female - mild 10/23/2012     Priority: Low     Cough 10/23/2012     Priority: Low     Acute bronchitis- recurrent- ? related to mold in school building- pt has since retired and no further bronchitis 10/23/2012     Priority: Low     Postmenopausal      Priority: Low     DERMATOPHYTOSIS OF NAIL- toenails  03/14/2006     Priority: Low     trichophyton rubrum on culture - 1/06        Synovial cyst of popliteal space      Priority: Low     Hidradenitis 12/05/2003     Priority: Low       Psychosocial & Contextual Factors: see HPI above    Assessment:  From Intake, 5/12/2022:  Jolynn Haji is a 66-year-old female with a history including anxiety, depression, insomnia status post concussion in April 2017.  No major mental health struggles prior to concussion in 2017.  Patient was started on Lexapro,  quetiapine, mirtazapine to help with her symptoms.  Patient had been feeling much better on that medication combination and once she was sleeping well again quetiapine and mirtazapine were discontinued.  This past January the patient started to have some worsening symptoms and Abilify was added.  Patient has felt like her current medications of Lexapro and Abilify have been helpful but reports starting to feel really slowed and numb the past several weeks.  She also reported a weight loss of nearly 20 pounds over the last 6 weeks versus several months (patient was not very sure). I am wondering if she could be experiencing side effects of her increased Lexapro dose.  She is agreeable to decreasing her dose slightly to 15 mg daily.  Could also consider decreasing Abilify in the case it is not her Lexapro.  She also desires to sleep a little bit better than she has been.  Could consider restarting mirtazapine at bedtime for sleep and to also augment her Lexapro.  Could also be beneficial for patient to undergo cognitive screening to check in and see what her baseline is status post concussion.  No acute safety concerns.  No SI.  No problematic drug or alcohol use.    5/24/2022:  Patient with some slight improvements of possible medication related side effects after decreasing Lexapro.  I am wondering if there might be more robust improvements with decreasing Abilify/aripiprazole.  Possible drug-induced Parkinsonism?  She does seem to have delayed/slowed speech, masked facies.  Her gait and other movements unable to be observed but she does report other struggles that may be consistent with such a presentation.  We are going to decrease her Abilify down to 2 mg daily and I will see her back in just a few weeks.  We will likely discontinue her Abilify at her next visit.  I will message her primary care provider to get some collateral regarding previous baseline functioning prior to starting Abilify.  History of TBI  could be confounding presentation.  Could consider neurology consult.    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  Instructed to stop Abilify today.  We will monitor for improvement of symptoms.  If symptoms do not improve may need to refer to neurology.  If symptoms start to improve we will consider low-dose methylphenidate augmentation of Lexapro next visit to help with low energy, mood, focus and concentration, cognition, s/p TBI if still clinically indicated.  We would discuss risks and benefits with patient and her daughter at that time.  No other acute safety concerns or SI.  No problematic drug or alcohol use.  If patient starts to struggle with sleep would restart mirtazapine at bedtime.    7/28/2022:  Pt still with sxs consistent with drug-induced Parksinsonism.  Neurology consult will be placed so that patient might get scheduled sooner than later due to the typically long wait.  We will start Ambien in hopes we can get her sleeping much better.  Discussed risks and benefits of its use, particularly in the geriatric population.  Lexapro has not been as helpful as it helped for her anxiety.  We will taper this medication and start Effexor/venlafaxine in hopes that might be more effective for her symptoms.  No acute safety concerns.  No SI.  No problematic drug or alcohol use.    8/11/2022:  Overall with continued improved symptoms.  Hopefully patient will continue to have improvement of her symptoms off of Abilify.  Patient will be seeing neurology soon.  Encouraged to keep appointment.  We will continue taper off of Lexapro and continue on monotherapy with venlafaxine.  We will also continue Ambien at this time for sleep since patient is sleeping much better on the medication with improved symptoms and tolerating well.  No acute safety concerns.  No problematic drug or alcohol use.    9/1/2022:  Ongoing anxiety, still intense at times.  Still not sleeping very well.  Could  consider sleep evaluation, but patient quite overwhelmed right now with various referrals and doctors appointments.  Ambien had been working quite well but not keeping her sleep now.  Still falling asleep relatively well.  We will transition to Ambien CR to see if that is more helpful.  We will continue to consider sleep evaluation to rule out possibility of REM behavior sleep disorder.  We will also transition venlafaxine to bedtime to see if that helps with any possible nausea.  Working with neurology to monitor for Parkinson's disease.  I do recommend neuropsychological testing due to ongoing possible cognitive difficulties/struggles.  No acute safety concerns.  No SI.  No problematic drug or alcohol use.    10/3/2022:  Patient overall with some ongoing anxiety but slowly improving since it got worse late August.  Recommend continuing to optimize Effexor-XR, especially since patient tolerating well.  No longer having any nausea now that dose is taken at bedtime.  Patient feeling a little foggy and off balance with Ambien CR and so we will switch back to Ambien immediate release.  No falls.  Recommend increasing Effexor-XR first and once well tolerated switching back to Ambien immediate release.  May need to continue to work on alternative options for sleep if does not do well back on Ambien immediate release.  No acute safety concerns.  No SI.  No problematic drug or alcohol use.  Encouraged to continue working with neurology.  Next appointment in November.    10/24/2022:  Patient overall with some improved anxiety on increased dose of Effexor-XR.  Could use additional improvement of anxiety and so we will continue to optimize therapy with Effexor-XR.  We will increase dose to 150 mg daily.  Tolerating well with no notable negative side effects.  Patient transitioned back to Ambien CR and is sleeping better on this compared to Ambien immediate release.  We will continue Ambien CR for sleep.  Discussed possibility  of sleep medicine referral but patient declines at this time.  Could consider in the future.  No acute safety concerns.  No SI.  No problematic drug or alcohol use.  Encouraged to continue working with neurology.    12/09/2022:  Patient overall doing fairly well.  Feels like increased dose of venlafaxine has been helpful.  I do wonder if she is having some subtle withdrawal symptoms late in the afternoon with headache and fatigue.  Discussed shifting her dose to the morning.  However, she reports she will try to drink more water and see if that is helpful before trying to make changes to her medication.  She feels like things are going well enough that she really does not want to change anything.  Encouraged to continue in therapy.  No acute safety concerns.  No SI.  No problematic drug or alcohol use.    1/19/2023:  Patient overall doing well with ongoing improvements.  Patient even recently starting to drive again which is a big deal.  She continues in therapy and finds it helpful.  Discussed possible changes to her dosing to see if headaches might improve, along with fatigue.  Discussed possibly splitting her Effexor-XR dose as 75 mg twice daily versus 37 point 5 in the morning and 112.5 mg at bedtime.  Patient feels like she is in a good place right now and really does not want to make any medication changes if possible.  We will keep things the same for now.  Encouraged to drink lots of water.  No acute safety concerns.  No SI.  No problematic drug or alcohol use.    3/9/2023:  Since last visit we decreased Effexor-XR just slightly.  Unfortunately her anxiety has worsened on decreased dose.  Headaches and fatigue did not improve at all.  Since anxiety worsened we will go back up to 150 mg daily.  Patient prefers this over a trial of further decreased dose.  We will start a trial with propranolol to see if this helps improve anxiety and headaches.  May also help with her tremor.  Discussed watching for feeling  too lightheaded or dizzy.  Patient also on low-dose lisinopril.  Last vital signs blood pressure 132/78 and pulse 96 on 2/24/2023.  No acute safety concerns.  No SI.  No problematic drug or alcohol use.    3/23/2023:  Patient overall doing very well.  Propranolol has been quite helpful.  Still having some anxiety and headaches so we will continue to optimize therapy with propranolol some.  We will increase to 20 mg twice daily.  Neuropsychiatric testing reviewed.  Some mild deficits but nothing indicating a neurocognitive disorder at this time.  This was reassuring to the patient.  Patient will review results with neurologist at upcoming appointment in May.  No acute safety concerns.  No SI.  No problematic drug or alcohol use.    4/27/2023:  Patient overall with relative stability of mood and anxiety symptoms.  We will work to discontinue Ambien to see if we can manage on medication such as trazodone.  I still continue to worry about possibility of prodromal Parkinson's disease.  Patient reported her sister noted patient to be yelling and screaming in her sleep the other night.  This concerns me for possible REM sleep behavior disorder.  Could simply be related to antidepressant therapy-but given recent parkinsonism response to neuroleptic, symptoms should continue to be monitored closely.  Could again consider sleep medicine referral at some point in the future if needed.  Patient also continues to have headaches.  If patient remains stable would like to trial decreased dose of Effexor-XR to see if this helps improve headaches.  No acute safety concerns.  No SI.  No problematic drug or alcohol use.    5/11/2023:  Patient overall doing well.  Was able to replace Ambien with trazodone and still sleeping well.  Still some headaches but greatly improved off Ambien.  Went back to taking propranolol 20 mg twice daily and finds this most effective for her anxiety, tremor, etc.  Discussed we could consider lowering  venlafaxine ER in a couple of months if still doing really well and still having some headaches.  Patient has a neurology appointment tomorrow for check-in.  No acute safety concerns.  No SI.  No problematic drug or alcohol use.    Medication side effects and alternatives were reviewed. Health promotion activities recommended and reviewed today. All questions addressed. Education and counseling completed regarding risks and benefits of medications and psychotherapy options. Recommend therapy for additional support.     Treatment Plan:     Continue trazodone 25-50 mg at bedtime as needed for sleep    Continue Effexor-XR/venlafaxine  mg daily for depression and anxiety.     Continue propranolol 20 mg twice daily for anxiety, tremor, headaches.    Continue to follow with neurology as they recommend.      Continue all other cares per primary care provider.     Continue all other medications as reviewed per electronic medical record today.     Safety plan reviewed. To the Emergency Department as needed or call after hours crisis line at [phone] or [phone]. [place] Crisis Text Line. Text [place] to 880607 or Suicide LifeLine Chat: suicidepreventionlifeline.org/chat    Continue individual psychotherapy for additional support and ongoing development of nonpharmacologic coping skills and strategies.    Schedule an appointment with me in 2 months or sooner as needed. Call [facility] at [phone] to schedule.    Follow up with primary care provider as planned or for acute medical concerns.    Call the psychiatric nurse line with medication questions or concerns at [phone].    MyChart may be used to communicate with your provider, but this is not intended to be used for emergencies.    Administrative Billing:   Phone Call/Video Duration: 13 Minutes  Start: 1:35p  Stop: 1:48p    Patient Status:  Patient will continue to be seen for ongoing consultation and stabilization.    Signed:   Judy  Aletha,   [facility]    Disclaimer: This note consists of symbols derived from keyboarding, dictation and/or voice recognition software. As a result, there may be errors in the script that have gone undetected. Please consider this when interpreting information found in this chart.

## 2023-05-16 ENCOUNTER — OFFICE VISIT (OUTPATIENT)
Dept: DERMATOLOGY | Facility: CLINIC | Age: 68
End: 2023-05-16
Payer: COMMERCIAL

## 2023-05-16 DIAGNOSIS — L82.1 SEBORRHEIC KERATOSIS: ICD-10-CM

## 2023-05-16 DIAGNOSIS — Z85.828 HISTORY OF SKIN CANCER: ICD-10-CM

## 2023-05-16 DIAGNOSIS — D22.9 NEVUS: Primary | ICD-10-CM

## 2023-05-16 DIAGNOSIS — L81.4 LENTIGO: ICD-10-CM

## 2023-05-16 DIAGNOSIS — D48.5 NEOPLASM OF UNCERTAIN BEHAVIOR OF SKIN: ICD-10-CM

## 2023-05-16 DIAGNOSIS — D18.01 ANGIOMA OF SKIN: ICD-10-CM

## 2023-05-16 PROCEDURE — 11102 TANGNTL BX SKIN SINGLE LES: CPT | Performed by: PHYSICIAN ASSISTANT

## 2023-05-16 PROCEDURE — 88305 TISSUE EXAM BY PATHOLOGIST: CPT | Performed by: DERMATOLOGY

## 2023-05-16 PROCEDURE — 11103 TANGNTL BX SKIN EA SEP/ADDL: CPT | Performed by: PHYSICIAN ASSISTANT

## 2023-05-16 PROCEDURE — 99213 OFFICE O/P EST LOW 20 MIN: CPT | Mod: 25 | Performed by: PHYSICIAN ASSISTANT

## 2023-05-16 NOTE — LETTER
5/16/2023         RE: Jolynn Haji  [address] [place] [address]        Dear Colleague,    Thank you for referring your patient, Jolynn Haji, to the [facility]. Please see a copy of my visit note below.    HPI:   Chief complaints: Jolynn Haji is a 67 year old female who presents for Full skin cancer screening to rule out skin cancer   Last Skin Exam: 6 mo ago     1st Baseline: no  Personal HX of Skin Cancer: yes multiple BCC and SCC    Personal HX of Malignant Melanoma: no   Family HX of Skin Cancer / Malignant Melanoma: no  Personal HX of Atypical Moles:   no  Risk factors: history of sun exposure and burns; history of blistering burns in the past; history of tanning bed use in the past  New / Changing lesions: None  Social History: has 2 children and 2 grandchildren; getting ready to open cabin   On review of systems, there are no further skin complaints, patient is feeling otherwise well.  See patient intake sheet.  ROS of the following were done and are negative: Constitutional, Eyes, Ears, Nose,   Mouth, Throat, Cardiovascular, Respiratory, GI, Genitourinary, Musculoskeletal,   Psychiatric, Endocrine, Allergic/Immunologic.    PHYSICAL EXAM:   LMP 06/28/2001   Skin exam performed as follows: Type 2 skin. Mood appropriate  Alert and Oriented X 3. Well developed, well nourished in no distress.  General appearance: Normal  Head including face: Normal  Eyes: conjunctiva and lids: Normal  Mouth: Lips, teeth, gums: Normal  Neck: Normal  Chest-breast/axillae: Normal  Back: Normal  Spleen and liver: Normal  Cardiovascular: Exam of peripheral vascular system by observation for swelling, varicosities, edema: Normal  Genitalia: groin, buttocks: Normal  Extremities: digits/nails (clubbing): Normal  Eccrine and Apocrine glands: Normal  Right upper extremity: Normal  Left upper extremity: Normal  Right lower extremity: Normal  Left lower extremity: Normal  Skin: Scalp and body  hair: See below    Pt deferred exam of breasts, groin, buttocks: No    Other physical findings:  1. Multiple pigmented macules on extremities and trunk  2. Multiple pigmented macules on face, trunk and extremities  3. Multiple vascular papules on trunk, arms and legs  4. Multiple scattered keratotic plaques  5. 6 mm pink papule on the right lower leg  6. 7 mm irregular brown macule on the central back  7. 5 mm pink papule on the right medial chest       Except as noted above, no other signs of skin cancer or melanoma.     ASSESSMENT/PLAN:   Benign Full skin cancer screening today. . Patient with history of multiple BCC, SCC  Advised on monthly self exams and 1 year  Patient Education: Appropriate brochures given.    1. Multiple benign appearing nevi on arms, legs and trunk. Discussed ABCDEs of melanoma and sunscreen.   2. Multiple lentigos on arms, legs and trunk. Advised benign, no treatment needed.  3. Multiple scattered angiomas. Advised benign, no treatment needed.   4. Seborrheic keratosis on arms, legs and trunk. Advised benign, no treatment needed.  5. R/o SCC on the right lower leg, BCC on the right medial chest. Shave biopsy performed.  Area cleaned and anesthetized with 1% lidocaine with epinephrine.  Dermablade used to remove the lesion and sent to pathology. Bleeding was cauterized. Pt tolerated procedure well with no complications.   6. Atypical nevus on the central back. Shave biopsy performed.  Area cleaned and anesthetized with 1% lidocaine with epinephrine.  Dermablade used to remove the lesion and sent to pathology. Bleeding was cauterized. Pt tolerated procedure well with no complications.                 Follow-up: yearly FSE/PRN sooner    1.) Patient was asked about new and changing moles. YES  2.) Patient received a complete physical skin examination: YES  3.) Patient was counseled to perform a monthly self skin examination: YES  Scribed By: Ligia Carbajal, MS, PA-C          Again, thank you  for allowing me to participate in the care of your patient.        Sincerely,        Ligia Garcia PA-C

## 2023-05-16 NOTE — PROGRESS NOTES
HPI:   Chief complaints: Jolynn Haji is a 67 year old female who presents for Full skin cancer screening to rule out skin cancer   Last Skin Exam: 6 mo ago     1st Baseline: no  Personal HX of Skin Cancer: yes multiple BCC and SCC    Personal HX of Malignant Melanoma: no   Family HX of Skin Cancer / Malignant Melanoma: no  Personal HX of Atypical Moles:   no  Risk factors: history of sun exposure and burns; history of blistering burns in the past; history of tanning bed use in the past  New / Changing lesions: None  Social History: has 2 children and 2 grandchildren; getting ready to open cabin   On review of systems, there are no further skin complaints, patient is feeling otherwise well.  See patient intake sheet.  ROS of the following were done and are negative: Constitutional, Eyes, Ears, Nose,   Mouth, Throat, Cardiovascular, Respiratory, GI, Genitourinary, Musculoskeletal,   Psychiatric, Endocrine, Allergic/Immunologic.    PHYSICAL EXAM:   LMP 06/28/2001   Skin exam performed as follows: Type 2 skin. Mood appropriate  Alert and Oriented X 3. Well developed, well nourished in no distress.  General appearance: Normal  Head including face: Normal  Eyes: conjunctiva and lids: Normal  Mouth: Lips, teeth, gums: Normal  Neck: Normal  Chest-breast/axillae: Normal  Back: Normal  Spleen and liver: Normal  Cardiovascular: Exam of peripheral vascular system by observation for swelling, varicosities, edema: Normal  Genitalia: groin, buttocks: Normal  Extremities: digits/nails (clubbing): Normal  Eccrine and Apocrine glands: Normal  Right upper extremity: Normal  Left upper extremity: Normal  Right lower extremity: Normal  Left lower extremity: Normal  Skin: Scalp and body hair: See below    Pt deferred exam of breasts, groin, buttocks: No    Other physical findings:  1. Multiple pigmented macules on extremities and trunk  2. Multiple pigmented macules on face, trunk and extremities  3. Multiple vascular papules on trunk,  arms and legs  4. Multiple scattered keratotic plaques  5. 6 mm pink papule on the right lower leg  6. 7 mm irregular brown macule on the central back  7. 5 mm pink papule on the right medial chest       Except as noted above, no other signs of skin cancer or melanoma.     ASSESSMENT/PLAN:   Benign Full skin cancer screening today. . Patient with history of multiple BCC, SCC  Advised on monthly self exams and 1 year  Patient Education: Appropriate brochures given.    1. Multiple benign appearing nevi on arms, legs and trunk. Discussed ABCDEs of melanoma and sunscreen.   2. Multiple lentigos on arms, legs and trunk. Advised benign, no treatment needed.  3. Multiple scattered angiomas. Advised benign, no treatment needed.   4. Seborrheic keratosis on arms, legs and trunk. Advised benign, no treatment needed.  5. R/o SCC on the right lower leg, BCC on the right medial chest. Shave biopsy performed.  Area cleaned and anesthetized with 1% lidocaine with epinephrine.  Dermablade used to remove the lesion and sent to pathology. Bleeding was cauterized. Pt tolerated procedure well with no complications.   6. Atypical nevus on the central back. Shave biopsy performed.  Area cleaned and anesthetized with 1% lidocaine with epinephrine.  Dermablade used to remove the lesion and sent to pathology. Bleeding was cauterized. Pt tolerated procedure well with no complications.                 Follow-up: yearly FSE/PRN sooner    1.) Patient was asked about new and changing moles. YES  2.) Patient received a complete physical skin examination: YES  3.) Patient was counseled to perform a monthly self skin examination: YES  Scribed By: Ligia Carbajal, MS, JACQUIE

## 2023-05-16 NOTE — PATIENT INSTRUCTIONS
Wound Care Instructions     FOR SUPERFICIAL WOUNDS     [facility]  [phone]                 AFTER 24 HOURS YOU SHOULD REMOVE THE BANDAGE AND BEGIN DAILY DRESSING CHANGES AS FOLLOWS:     1) Remove Dressing.     2) Clean and dry the area with tap water using a Q-tip or sterile gauze pad.     3) Apply Vaseline, Aquaphor, Polysporin ointment or Bacitracin ointment over entire wound.  Do NOT use Neosporin ointment.     4) Cover the wound with a band-aid, or a sterile non-stick gauze pad and micropore paper tape    REPEAT THESE INSTRUCTIONS AT LEAST ONCE A DAY UNTIL THE WOUND HAS COMPLETELY HEALED.    It is an old wives tale that a wound heals better when it is exposed to air and allowed to dry out. The wound will heal faster with a better cosmetic result if it is kept moist with ointment and covered with a bandage.    **Do not let the wound dry out.**    Supplies Needed:      *Cotton tipped applicators (Q-tips)    *Vaseline, Aquaphor, Polysporin or Bacitracin Ointment (NOT NEOSPORIN)    *Band-aids or non-stick gauze pads and micropore paper tape.      PATIENT INFORMATION:    During the healing process you will notice a number of changes. All wounds develop a small halo of redness surrounding the wound.  This means healing is occurring. Severe itching with extensive redness usually indicates sensitivity to the ointment or bandage tape used to dress the wound.  You should call our office if this develops.      Swelling  and/or discoloration around your surgical site is common, particularly when performed around the eye.    All wounds normally drain.  The larger the wound the more drainage there will be.  After 7-10 days, you will notice the wound beginning to shrink and new skin will begin to grow.  The wound is healed when you can see skin has formed over the entire area.  A healed wound has a healthy, shiny look to the surface and is red to dark pink in color to normalize.  Wounds may  take approximately 4-6 weeks to heal.  Larger wounds may take 6-8 weeks.  After the wound is healed you may discontinue dressing changes.    You may experience a sensation of tightness as your wound heals. This is normal and will gradually subside.    Your healed wound may be sensitive to temperature changes. This sensitivity improves with time, but if you re having a lot of discomfort, try to avoid temperature extremes.    Patients frequently experience itching after their wound appears to have healed because of the continue healing under the skin.  Plain Vaseline will help relieve the itching.      POSSIBLE COMPLICATIONS    BLEEDING:    Leave the bandage in place.  Use tightly rolled up gauze or a cloth to apply direct pressure over the bandage for 30  minutes.  Reapply pressure for an additional 30 minutes if necessary  Use additional gauze and tape to maintain pressure once the bleeding has stopped.

## 2023-05-17 ENCOUNTER — VIRTUAL VISIT (OUTPATIENT)
Dept: BEHAVIORAL HEALTH | Facility: CLINIC | Age: 68
End: 2023-05-17
Payer: COMMERCIAL

## 2023-05-17 DIAGNOSIS — G47.00 INSOMNIA, UNSPECIFIED TYPE: ICD-10-CM

## 2023-05-17 DIAGNOSIS — F41.1 GENERALIZED ANXIETY DISORDER: Primary | ICD-10-CM

## 2023-05-17 DIAGNOSIS — F39 MOOD DISORDER (H): ICD-10-CM

## 2023-05-17 PROCEDURE — 90834 PSYTX W PT 45 MINUTES: CPT | Mod: VID | Performed by: SOCIAL WORKER

## 2023-05-18 ENCOUNTER — TELEPHONE (OUTPATIENT)
Dept: DERMATOLOGY | Facility: CLINIC | Age: 68
End: 2023-05-18
Payer: COMMERCIAL

## 2023-05-18 LAB
PATH REPORT.COMMENTS IMP SPEC: ABNORMAL
PATH REPORT.COMMENTS IMP SPEC: ABNORMAL
PATH REPORT.COMMENTS IMP SPEC: YES
PATH REPORT.FINAL DX SPEC: ABNORMAL
PATH REPORT.GROSS SPEC: ABNORMAL
PATH REPORT.MICROSCOPIC SPEC OTHER STN: ABNORMAL
PATH REPORT.RELEVANT HX SPEC: ABNORMAL

## 2023-05-18 NOTE — LETTER
[address]  [address]  [phone]        5/19/2023       Jolynn Haji  [address]  [place] [address]      Dear Jolynn:    You are scheduled for Mohs Surgery on: July 27th and August 3, 2023 at 8:15 AM  for two different sites.    Please check in at [facility], [address].     You don't need to arrive more than 5-10 minutes prior to your appointment time.     Be sure to eat a good breakfast and bathe and wash your hair prior to surgery.     If you are taking any anti-coagulants that are prescribed by your Doctor (such as Coumadin/Warfarin, Plavix, Aspirin, Ibuprofen), please continue taking them.     However, if you are taking anti-coagulants over the counter without a Doctor's order for a medical condition, please discontinue them 10 days prior to surgery.     Please wear loose comfortable clothing as it could possibly be 4-6 hours until your surgery is completed depending upon how many layers of tissue need to be removed.      Thank you,    JASBIR Rogers MD

## 2023-05-18 NOTE — TELEPHONE ENCOUNTER
----- Message from Ligia Carbajal PA-C sent at 5/18/2023  1:21 PM CDT -----  Right lower leg , right medial chest both SCIS please schedule excision   Central back moderately atypical nevus; appears completely excised no further treatment at this time.

## 2023-05-18 NOTE — TELEPHONE ENCOUNTER
Left message on answering machine for patient to call back.    Linda ROGERS RN  Dermatology   [phone]

## 2023-05-19 NOTE — CONFIDENTIAL NOTE
Called patient:  Patient notified and educated on test results   Mohs procedure explained- all questions answered  appointment scheduled- mohs letter sent via [employer].    Thank you,    Melvina BARAHONARN BSN  [facility]  [phone]

## 2023-05-30 NOTE — PROGRESS NOTES
[facility] Behavioral [facility]  May 31, 2023      Behavioral Health Clinician Progress Note    Patient Name: Jolynn Haji           Service Type:  Individual      Service Location:   MyChart / Email (patient reached)     Session Start Time: 11:00 am   Session End Time: 11:54 am      Session Length: 38 - 52      Attendees: Patient     Service Modality:  Video Visit:      Provider verified identity through the following two step process.  Patient provided:  Patient is known previously to provider    Telemedicine Visit: The patient's condition can be safely assessed and treated via synchronous audio and visual telemedicine encounter.      Reason for Telemedicine Visit: Patient has requested telehealth visit    Originating Site (Patient Location): Patient's home    Distant Site (Provider Location): [facility]    Consent:  The patient/guardian has verbally consented to: the potential risks and benefits of telemedicine (video visit) versus in person care; bill my insurance or make self-payment for services provided; and responsibility for payment of non-covered services.     Patient would like the video invitation sent by:  My Chart    Mode of Communication:  Video Conference via Amwell    As the provider I attest to compliance with applicable laws and regulations related to telemedicine.    Visit Activities (Refresh list every visit): [facility] Only    Diagnostic Assessment Date: 9/27/22  Treatment Plan Review Date: 7/27/23  See Flowsheets for today's PHQ-9 and TIFFANY-7 results  Previous PHQ-9:       3/23/2023     5:30 PM 4/26/2023    11:16 AM 5/3/2023     1:18 PM   PHQ-9 SCORE   PHQ-9 Total Score MyChart 1 (Minimal depression) 1 (Minimal depression) 1 (Minimal depression)   PHQ-9 Total Score 1 1 1     Previous TIFFANY-7:       3/21/2023    11:20 AM 3/23/2023     5:33 PM 5/3/2023     1:18 PM   TIFFANY-7 SCORE   Total Score 1 (minimal anxiety) 5 (mild anxiety) 3  (minimal anxiety)   Total Score 1 5 3       KARINA LEVEL:      8/27/2010     3:00 PM 2/22/2011    11:00 AM   KARINA Score (Last Two)   KARINA Raw Score 51 49   Activation Score 91.6 82.8   KARINA Level 4 4       DATA  Extended Session (60+ minutes): No  Interactive Complexity: No  Crisis: No  [facility] Patient: No    Treatment Objective(s) Addressed in This Session:  Target Behavior(s): anxiety and depression    Depressed Mood: Increase interest, engagement, and pleasure in doing things  Decrease frequency and intensity of feeling down, depressed, hopeless  Improve quantity and quality of night time sleep / decrease daytime naps  Feel less tired and more energy during the day   Improve diet, appetite, mindful eating, and / or meal planning  Identify negative self-talk and behaviors: challenge core beliefs, myths, and actions  Improve concentration, focus, and mindfulness in daily activities   Feel less fidgety, restless or slow in daily activities / interpersonal interactions  Anxiety: will experience a reduction in anxiety, will develop more effective coping skills to manage anxiety symptoms, will develop healthy cognitive patterns and beliefs and will increase ability to function adaptively    Current Stressors / Issues:  Reported she had a wonderful Memorial Day weekend with family at the [facility]. Reporting they have followed up with husbands pancreas. MDs have recommended removing it completely. They have to make many decisions with his health. Anxiety has been high as a result. They have time to make decisions but all options are not ideal. Discussed maintaining healthy skills for dealing with anxiety.       5/17/23  Went to see friend with high anxiety. She said conversation was good and she was able to help with relating to her. Discussed challenging self to drive further distances. This has been going really well for her. Discussed husbands doctors appt and decision to have surgery or not.      5/11/23 ([facility])  MH update:  Reports feeling more clear headed on trazodone and off Ambien. She is taking half the pill at night. Headaches have been less and less sever. No change in sleep. She has been more active in doing things she would have done over a year ago. Not stressing about the cabin remodel. She has been spending time with grandchildren. Notes family has really noticed difference in her mental health. Reports reading recommended books by [facility]. Going to see friend struggling with mental health.   [place]  Appetite: unremarkable  Sleep: unremarkable  Therapy: weekly therapy  FARIDA: na  Preg: na  Most important: Does it matter if she alters the dose of trazodone from day to day.     5/1/23  Reports they drove to the [employer] this weekend. Feels things are progressing well. Discussed switching medication for sleep to Trazodone. She has been sleeping the same on Ambien. She is taking a half pill and not having any grogginess. She has been engaged with friends and [employer]. Recommended Gifts of Imperfections and I Thought It Was Just Me by Darrick Arteaga. Also Decluttering Your Mind by CAT Melgar. Discussed having others notice positive changes in her. She struggles with having that spot light on her.       4/27/23 ([facility])  MH update: doing well with heightened anxiety but not panic attacks.   Stresses: lake [place] renovations   Appetite: unremarkable  Sleep: unremarkable  Therapy: seeing therapist weekly, reports sister came for several days that was great. Making decision about traveling for family wedding.   FARIDA: na  Preg: na  Most important: continues to have headaches daily. Wants to discuss Ambien with MD/PCP suggested getting off completely. Would like to have Propanol for 10 mg to not have to cut.      4/19/23  Reports managing her anxiety around renovation of [employer] bathroom has been difficult. Discussed desire for control and letting go.Reports sister is coming for several days.  is having surgery tomorrow.  "She has been walking and exercising more.      4/12/23  Reports it she is doing well and had a great Easter with family. Discussed more about her anxiety (ABC), being self blaming (what ifs/should have) at nature but at other times unknown source. Discussed eliminating \"should' for vocabulary due to shame, self judgement, and regret associated with the word. She will continue to work on ABC and look at the use of should.     3/31/23  Reports she is doing well. Anxiety has been pretty good but has headaches when anxious. Looking forward to going to theater with grandchildren. Medications have been very helpful. Discussed ABC of Anxiety. Discussed events of anxiety at its worse. Discussed the beliefs she has about anxiety events. Discussed consequences of event.     3/23/23 [facility]  MH update: Reports anxiety has decreased a lot on the propanol. Continues to have headaches but only about half the days. She has noticed a stronger correlation with headaches and anxiety. She completed Neuro Psych testing. Relieved she has no new dx. Planning on walking with dog. She is feeling more like her self, \"5 star days.\"    Stresses: Neuro psych testing and going to wake alone was stressful. Felt she managed very well.   Appetite: unremarkable  Sleep: unremarkable  Therapy: Continues to do weekly therapy. Ila she worried a lot about testing and did not , lesson in not worrying about things that have not happened yet. Feeling very accomplished after attending a wake alone without her . She stated it went very well and anxiety was lower than normal and then expected. Discussed decision making in the moment. Discussed it can be difficult to make decisions in the moment. Continues with coping skills, ie meditation and breathing tch.   FARIDA: na  Preg: na  Most important: Wants to address doses of medications.     3/14/23  Feels the propanol has been really helping with anxiety. She has been feeling like her self for the past 5 " "days. Continues to have several headaches. She is considering going back to yoga after looking as some neck stretches.     3/9/23 ([facility])  She has been more anxious on the different dosing of medications. She has also not noticed decrease in headaches. Discussed neck tension and using body scanning. She continues to feel a bit foggy in the afternoons.     3/3/23  Reports doing well. States she has been in communication with MD about headaches. Has noticed on days when she has lower dose of med she has more anxiety. She has been examining her anxiety when going out socially. Does not feel it is associated with the socializing itself but rather \"will the anxiety come up.\" Discussed upcoming appt for neuro psych testing. Is hesitant but willing to do testing. May want to talk to MD about it prior appt. Fearful about Drug Induced Parkinson's.       2/17/23  Pt reports she is doing well. Reports anxiety has been better. Able to control it better with accepting and reognition of anxiety. Continues to utilize meditation and review of skills taught in therapy. Ice packs has been helping with headaches. Self talk has much improved and has gained more confidence. Anxiety in the evening have improved but still feeling the anxiety at times. She has been wanting to work exercise into her daily routine. Sleep has been also improve. ID values: umm, family, self respect responsibility and kindness.       2/10/23  Pt reports she had such a good time with her trip to her sisters house. Much reduced anxious than expected (compared to home) and sleep was really good. Discussed implementing things that worked at sisters for home. Continues to have some anxiety when going into store or before events. Reports panic attacks are less than previous week. Discussed triggers for anxiety. Discussed do expectations of self meet values.  ID values from next time and look at expectations for self. Pt asked \"What could I do better to be good " "enough\". Struggles with being good enough for self. Talked about list of things she wants to achieve in order to feel \"good enough\". One being able to go to places with out wondering if she will be anxious.     Identified Triggers for anxiety and background thought behind it:  More anxious when tired: less bandwidth to utilize skills  Feeling going to be late  Making quick decisions  Technology   organizing big meal for company: what people expect of her  MD visits or waiting for visit: anticipations of events at MD, has not had poor experiences, husbands medical experience   What if something happens to Zach   Packing groceries and people are behind her  Sometimes going out to eat (much better): menu with too many options and having to make decisions  Holiday: anticipation and wanting it to be perfect for people/what ng people expect of her  Dealing with insurance problem: hassle and being out of her control   Being in the unknown about things: Slowness of recovery from anxiety, what ifs  Do people see I am anxious  Large crowd of people: leading up to and during (highest), people might be noticing her, dont want to stand   *Things that need to be done but are not done(floor needing to be washed, getting decorations down etc): likes list to check off (what to accomplish that week), Not accomplishing goal. Self placed expectations (high standards from growing up), perfectionism (mom perfectionism as a child), some self comparisons to others, taking on too much during a week, and expecting to be like old self all the time (compare self to old self).     2/1/23  Pt is reporting she is doing well. She has been out driving to do shopping and seeing friends. Continues to have improvement with anxiety each day. Continues to have headaches but is wanting to change medications until she gets back from trip to her sisters home. Having some anxiety about spending over night for the first time since COVID. Discussed steps to " addressing anxiety at her sisters home and drive up. 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 Became dizzy and become worried about the physical sx she was experiencing. Anxiety grew from that point on. Discussed how she handled it in the moment with redirecting thoughts, accepting anxiety and attempting some of the anxiety tech discussed. Pt will explore other situations in which anxiety tends to arise for next session. Pt has been keeping a journal of her daily experience and will reexamine journal to better understand anxiety. Pt listed several situations she knows brings anxiety.      Identified Triggers:  Feeling rushed  Not wanting to be late  Having others do responsibilities she once carried as her responsibility  Being flexible  Expectations of others  What do others see/think of her when experiencing panic attack    1/23/23  Reports she is doing really well. Reports she will be driving up to see her grandson. She is highly looking forward to this and making [employer] cards. Some anxiety with going in to the grocery store but not once she gets started shopping. Reports her ultimate goal would be to go to the mall alone with traffic as that has been causing anxiety. Headaches continue to be of concern to her. Discussed further and identified them coming following either a panic attack or after high anxiety. Discussed using sensory tech for elevating the anxiety and also for following headache such as heating pad for neck or eyes. Discussed her conversation with Dr. Pompa about possibly changing meds to help with headaches. She reports relishing in current progress and is not wanting meds to change at this time. Headaches do interfere sometimes with daily life. She will be going to lunch with several college friends. This has caused a bit of anxiety in accepting the invite. Normalized experience for pt.       Progress on Treatment Objective(s) / Homework:  Satisfactory  progress - ACTION (Actively working towards change); Intervened by reinforcing change plan / affirming steps taken    Motivational Interviewing    MI Intervention: Expressed Empathy/Understanding, Supported Autonomy, Collaboration, Evocation, Open-ended questions, Change talk (evoked) and Reframe     Change Talk Expressed by the Patient: Desire to change Ability to change Reasons to change Need to change    Provider Response to Change Talk: E - Evoked more info from patient about behavior change, A - Affirmed patient's thoughts, decisions, or attempts at behavior change, R - Reflected patient's change talk and S - Summarized patient's change talk statements      Care Plan review completed: Yes    Medication Review:  No changes to current psychiatric medication(s)    Medication Compliance:  Yes    Changes in Health Issues:   None reported    Chemical Use Review:   Substance Use: Chemical use reviewed, no active concerns identified      Tobacco Use: No current tobacco use.      Assessment: Current Emotional / Mental Status (status of significant symptoms):  Risk status (Self / Other harm or suicidal ideation)  Patient denies a history of suicidal ideation, suicide attempts, self-injurious behavior, homicidal ideation, homicidal behavior and and other safety concerns  Patient denies current fears or concerns for personal safety.  Patient denies current or recent suicidal ideation or behaviors.  Patient denies current or recent homicidal ideation or behaviors.  Patient denies current or recent self injurious behavior or ideation.  Patient denies other safety concerns.  A safety and risk management plan has not been developed at this time, however patient was encouraged to call [facility] should there be a change in any of these risk factors.    Appearance:   Appropriate   Eye Contact:   Good   Psychomotor Behavior: Normal   Attitude:   Cooperative   Orientation:   All  Speech   Rate / Production: Normal     Volume:  Normal   Mood:    Anxious   Affect:    Appropriate   Thought Content:  Clear   Thought Form:  Coherent  Logical   Insight:    Good     Diagnoses:  1. Generalized anxiety disorder    2. Insomnia, unspecified type        Collateral Reports Completed:  Not Applicable    Plan: (Homework, other):  Patient was given information about behavioral services and encouraged to schedule a follow up appointment with the clinic [facility] in 1 week.  She was also given information about mental health symptoms and treatment options .  CD Recommendations: No indications of CD issues.  Erika Francisco MSW LICSW      ______________________________________________________________________    Integrated Primary Care Behavioral Health Treatment Plan    Patient's Name: Jolynn Haji  YOB: 1955    Date of Creation: 10/5/22  Date Treatment Plan Last Reviewed/Revised: 4/27/23    DSM5 Diagnoses: 300.02 (F41.1) Generalized Anxiety Disorder  Psychosocial / Contextual Factors: Individual Factors high anxiety associated with depressive sx, interfereing with ability to function as she would like.  .  PROMIS (reviewed every 90 days):     Referral / Collaboration:  Was/were discussed and patient will pursue. - individual therapy    Anticipated number of session for this episode of care: 5-8 sessions  Anticipation frequency of session: Weekly  Anticipated Duration of each session: 38-52 minutes  Treatment plan will be reviewed in 90 days or when goals have been changed.       MeasurableTreatment Goal(s) related to diagnosis / functional impairment(s)  Goal 1: Patient will work with provider to manage symptoms    I will know I've met my goal when when less anxious and feeling down.      Objective #A (Patient Action)    Patient will  attend all sessions.  Status: Continued - Date(s): 4/27/23    Intervention(s)  Therapist will teach educate patient on treatment options, clarify concerns, work with pt to overcome any resistance to  compliance..    Objective #B  Patient will identify 3 fears / thoughts that contribute to feeling anxious.  Status: Continued - Date(s): 4/27/23     Intervention(s)  Therapist will educate patient on treatment options, clarify concerns, work with pt to overcome any resistance to compliance..    Objective #C  Patient will identify 3 initial signs or symptoms of anxiety.  Status: Continued - Date(s): 4/27/23     Intervention(s)  Therapist will educate patient on treatment options, clarify concerns, work with pt to overcome any resistance to compliance..        Patient has reviewed and agreed to the above plan.      Erika Francisco [facility]  May 31, 2023  Answers for HPI/ROS submitted by the patient on 12/6/2022  TIFFANY 7 TOTAL SCORE: 3  Answers for HPI/ROS submitted by the patient on 3/8/2023  If you checked off any problems, how difficult have these problems made it for you to do your work, take care of things at home, or get along with other people?: Not difficult at all  PHQ9 TOTAL SCORE: 5  Answers for HPI/ROS submitted by the patient on 4/26/2023  If you checked off any problems, how difficult have these problems made it for you to do your work, take care of things at home, or get along with other people?: Somewhat difficult  PHQ9 TOTAL SCORE: 1

## 2023-05-31 ENCOUNTER — VIRTUAL VISIT (OUTPATIENT)
Dept: BEHAVIORAL HEALTH | Facility: CLINIC | Age: 68
End: 2023-05-31
Payer: COMMERCIAL

## 2023-05-31 DIAGNOSIS — F41.1 GENERALIZED ANXIETY DISORDER: Primary | ICD-10-CM

## 2023-05-31 DIAGNOSIS — G47.00 INSOMNIA, UNSPECIFIED TYPE: ICD-10-CM

## 2023-05-31 PROCEDURE — 90837 PSYTX W PT 60 MINUTES: CPT | Mod: VID | Performed by: SOCIAL WORKER

## 2023-06-01 ENCOUNTER — TELEPHONE (OUTPATIENT)
Dept: GASTROENTEROLOGY | Facility: CLINIC | Age: 68
End: 2023-06-01
Payer: COMMERCIAL

## 2023-06-01 NOTE — TELEPHONE ENCOUNTER
Screening Questions  BLUE  KIND OF PREP RED  LOCATION [review exclusion criteria] GREEN  SEDATION TYPE        Yes Are you active on mychart?       [facility] Ordering/Referring Provider?        BCBS  Medicare What type of coverage do you have?      N Have you had a positive covid test in the last 14 days?     20.83 1. BMI  [BMI 40+ - review exclusion criteria& smart-phrase document]    Yes  2. Are you able to give consent for your medical care? [IF NO,RN REVIEW]          N  3. Are you taking any prescription pain medications on a routine schedule   (ex narcotics: oxycodone, roxicodone, oxycontin,  and percocet)? [RN Review]        NA  3a. EXTENDED PREP What kind of prescription?     N 4. Do you have any chemical dependencies such as alcohol, street drugs, or methadone?        **If yes 3- 5 , please schedule with MAC sedation.**          IF YES TO ANY 6 - 10 - HOSPITAL SETTING ONLY.     N 6.   Do you need assistance transferring?     N 7.   Have you had a heart or lung transplant?    N 8.   Are you currently on dialysis?   N 9.   Do you use daily home oxygen?   N 10. Do you take nitroglycerin?   10a. NA If yes, how often?     NA 11. Are you currently pregnant?    11a. NA If yes, how many weeks? [ Greater than 12 weeks, OR NEEDED]    N 12. Do you have Pulmonary Hypertension? *NEED PAC APPT AT [facility] w/ [facility]*     N 13. [review exclusion criteria]  Do you have any implantable devices in your body (pacemaker, defib, LVAD)?    N 14. In the past 6 months, have you had any heart related issues including cardiomyopathy or heart attack?     14a. N If yes, did it require cardiac stenting if so when?     N 15. Have you had a stroke or Transient ischemic attack (TIA - aka  mini stroke ) within 6 months?      N 16. Do you have mod to severe Obstructive Sleep Apnea?  [Hospital only]    N 17. Do you have SEVERE AND UNCONTROLLED asthma? *NEED PAC APPT AT [facility] w/MAC*     18.Do you take blood thinners?  No    N 19. Do you take any  "of the following medications?    NPhentermine    NOzempic    NWegovy (Semaglutide)      19a. If yes, \"Hold for 7 days before procedure.  Please consult your prescribing provider if you have questions about holding this medication.\"     N  20. Do you have chronic kidney disease?      N  21. Do you have a diagnosis of diabetes?      N  22. On a regular basis do you go 3-5 days between bowel movements?      23. Preferred [facility] for Pre Prescription         [facility] - [address]        - CLOSING REMINDERS -    You will receive a call from a Nurse to review instructions and health history.  This assessment must be completed prior to your procedure.  Failure to complete the Nurse assessment may result in the procedure being cancelled.      On the day of your procedure, please designatean adult(s) who can drive you home stay with you for the next 24 hours. The medicines used in the exam will make you sleepy. You will not be able to drive.      You cannot take public transportation, ride share services, or non-medical taxi service without a responsible caregiver.  Medical transport services are allowed with the requirement that a responsible caregiver will receive you at your destination.  We require that drivers and caregivers are confirmed prior to your procedure.      - SCHEDULING DETAILS -  N & NA Hospital Setting Required & If yes, what is the exclusion?   [facility]  Surgeon    8/25/23  Date of Procedure  Lower Endoscopy [Colonoscopy]  Type of Procedure Scheduled  [facility]- If you answer yes to questions #8, #20, #21 [  pts ]Which Colonoscopy Prep was Sent?   [facility] AGUIRRE Sedation Type     NO PAC / Pre-op Required                 "

## 2023-06-05 PROBLEM — F51.02 ADJUSTMENT INSOMNIA: Status: ACTIVE | Noted: 2021-12-23

## 2023-06-06 NOTE — PROGRESS NOTES
"  Outpatient Psychiatry Diagnostic Assessment     Provider:  FORTINO Reaves CNS 5/23/2017 10:43 AM  Patient Identification: Jolynn Haji  YOB: 1955   MRN: [id number]  Jolynn Haji is a 61 year old female  who presents for a 60 minute Diagnostic Assessment.   The patient s chief complaint is  I can't sleep\"  Patient was referred by Magdalena Santana at [facility] for urgent consult.  Jolynn is interviewed alone with a nurse practitioner student.  History of Present Illness      Pt had a series of PCP visit starting in March 2017 for sinusitis, bronchitis, otitis and was taking long course of several antibiotics which caused diarrhea and consequently, lost 10 lbs and also dizzy.  As a result, pt had a syncope episode with concussion.  Pt has been on different medications to deal with sleep and headache.    4/9/17: syncope, LOC x 1 minute.  Pt was found by  on the floor  4/12: seen by PCP for diarrhea, diagnosed with concussion after discussing syncope episode  4/13: started to see light on R visual field  4/14: WNL CT at ED as pt complained of visual change to PCP.  5/5: Seen by concussion clinic  5/11: seen by PCP for abdominal pain  5/16: Discontinue Amitriptyline and started Ativan 1-2 mg HS PRN  5/19: Seroquel 12.5 mg BID PRN for anxiety started  5/22: Seroquel 25-50 mg HS PRN started in addition to 12.5 mg BID.    Pt reports she started to take Seroquel 50 mg HS on 5/22/2017, but only tried PRN12.5 mg x3 (1afternoon dose on 5/19, 2 dose on 5/20) With HS Seroquel 50 mg, it helped for sleep \"for the first time in long time\", but day dose is giving dry mouth, unsure if it's working for anxiety.  Feels like \"floating away, tingling\" when on Seroquel.  Pt only took 1 dose of Ativan and this was given as sleeping augmentation; only take if waken up before 2:30 AM.  Pt is not currently taking Ativan.  Pt is also not taking Melatonin as she did not feel it was effective.  Reports she " does not like taking the medications in general.    Anxiety: Doesn't know how to identify anxiety, therefore, pt is having difficult time to determine if medicaiton is working for anxiety. After concussion, seemed to dwell on things more like almost ruminating though this was also present prior to concussion.  Difficulty with sleep, short memory and concentration is causing current anxiety.  When anxiety was high in the past, had problem with sleep as well.  Also reports it was very difficult to prepare for the visit today.  Noticed more difficulty and slower to get organized after concussion.  Driving down here today was also very anxiety causing.  Not easily reassured after concussion.  Reports tend to over-analyze prior to concussion. Filling forms has been difficult due to over-analysis prior to concussion, but it has been more difficult to do it after concussion.      Sleep: Never had problem with sleep prior to concussion.  After concussion, 6-7 hours/night with medication, then second night, 3.5 hours, then 1.5 hours, then will not sleep at all x 2 days.  Sleep was OK until 4/12 when she was diagnosed with concussion.  Doesn't feel diagnosis of concussion was difficult to accept, but feels insomnia had significant impact. Had occasional initial insomnia immediately after concussion and middle insomnia started with multiple medication change since the start of May.  Feels anxiety, bloating, gas is waking her up in the middle of the night.    Somatic concerns: Dry mouth started after series of antibiotics and exacerbated with 18th day of 21 days of Levaquin.  Headache improved, but with most new start of Seroquel, headache returned, but in different part of the head.  Prior to concussion, headache was in temporal, but after concussion, it's on occipital.  Pt is taking Tylenol x3/week and reports relief of headache after Tylenol administration.      Psychiatric Review of Systems:  The patient endorses symptoms  of depression per PHQ 9, 16: several days; depressed, more than half the days; low energy,self-failure, nearly daily:insomnia, low appetite, difficulty concentration, psychomotor retardation including loss of appetite, insomnia, psychomotor retardation, fatigue, feeling worthless, poor concentration, indecisiveness and weight change  Decreased (due to medication and diarrhea).   She  patient endorses symptoms of anxiety including fatigue, poor concentration and insomnia. GAD7: 18, very difficult, RANJIT: mildly: 6, moderately: 9, severely: 0  She endorses symptoms of reginald including no manic symptoms.    She endorses symptoms of psychosis including no psychotic symptoms.   She endorses symptoms of pathological gambling including none.   She endorses symptoms of an eating disorder including none.      The patient reports no current chemical use.    Chemical use history is described in detail in a separate section.    She reports no suicidal thoughts.    She reports no violent ideation.    Current treatment resources include none.   Other support workers include none.    Sleep assessment: varying sleep, reports anxiety hinders her to sleep, but also causes high anxiety about not sleeping    Past Psychiatric History   Previous providers:none, PCP  Past medication trials include Seroquel, Melatonin, Amitriptyline,   She has had no psychiatric hospitalizations.    Past psychotherapy trials include none.   She has had no treatment with ECT.    She has made no suicide attempts.    She  has no history of self-injurious behavior.    She has no history of violent behavior.    Chemical Use History      CAGE -AID completed and scanned to chart.  Scored 0 today.  Denies frequent use or abuse of alcohol  Cannabis denies  Other substance abuse:  Stimulants: denies, Hallucinogens: denies, Opiates: denies  Caffeine currently none in last 2 weeks, but used to drink 5 cups of coffee a day    Nicotine: denies  The patient has not had  treatment for chemical dependence.     Past Medical/Surgical History      The patient s primary care provider is as listed in the medical record.    Allergies are listed in the medical record.   Medications prescribed by other providers are as listed in the medical record.   The patient reports current physical symptoms including constipation, dry mouth and headache.    The patient s chronic medical conditions are as listed in the medical record and hypertension.    She reports a history of  Recent head injury on 4/9/2017  She reports a history of  Recent loss of consciousness with concussion on 4/9/2017  She reports no history of seizures.   She reports a history of  of other neurological concerns, occasional headache after concussion and there was a vision change x 1 on 4/13/2017.  Pt is followed by concussion clinic.    Patient Active Problem List   Diagnosis     Essential hypertension with goal blood pressure less than 140/90     Hidradenitis     Localized osteoarthritis of hand     Synovial cyst of popliteal space     DERMATOPHYTOSIS OF NAIL- toenails      Lipidosis     Osteopenia     Postmenopausal     NUMBNESS AND TINGLING OF FOOT - bilateral -mild      CARDIOVASCULAR SCREENING; LDL GOAL LESS THAN 160     Numbness of feet- distal feet R>L      Advanced directives, counseling/discussion     Stress incontinence, female - mild     Cough     Acute bronchitis- recurrent- ? related to mold in school building- pt has since retired and no further bronchitis     Rosacea     History of migraine headaches- assoc. with nausea/vomiting - resolved around menopause - were  premenstrual      Dupuytren's disease of palm - right 4th digit flexor tendon     Sun-damaged skin     Seasonal allergic rhinitis     Basal cell carcinoma of leg, right     Concussion with loss of consciousness, unspecified duration, initial encounter     Adjustment disorder with mixed anxiety and depressed mood- mild at this time     Family history of  celiac disease     Current Outpatient Prescriptions Ordered in [facility]   Medication Sig Dispense Refill     QUEtiapine (SEROQUEL) 25 MG tablet Take 0.5-1 tablets (12.5-25 mg) by mouth At Bedtime May also take 1/2 tab up to twice daily as needed for anxiety 45 tablet 1     LORazepam (ATIVAN) 1 MG tablet Take 1-2 tablets (1-2 mg) by mouth At Bedtime 20 tablet 0     lisinopril (PRINIVIL/ZESTRIL) 5 MG tablet Take 2 tablets (10 mg) by mouth daily 60 tablet 1     acetaminophen (TYLENOL) 500 MG tablet Take 2 tablets (1,000 mg) by mouth every 6 hours as needed for mild pain 100 tablet 0     naproxen sodium (ANAPROX) 220 MG tablet Take 1-2 tablets twice a day as needed for headache 60 tablet      melatonin 5 MG CAPS Take 5 mg by mouth daily as needed Take 4 hours prior to desired sleep       ondansetron (ZOFRAN ODT) 4 MG ODT tab Take 1-2 tablets (4-8 mg) by mouth every 8 hours as needed for nausea 20 tablet 1     fluticasone (FLONASE) 50 MCG/ACT spray Spray 1-2 sprays into both nostrils daily 1 Bottle 11     loratadine 10 MG capsule Take 10 mg by mouth daily 30 capsule      albuterol (PROAIR HFA, PROVENTIL HFA, VENTOLIN HFA) 108 (90 BASE) MCG/ACT inhaler Inhale 2 puffs into the lungs every 6 hours as needed for shortness of breath / dyspnea 1 Inhaler 5     fluticasone (FLOVENT HFA) 110 MCG/ACT inhaler Inhale 2 puffs into the lungs 2 times daily 2 puffs 1 Inhaler 12     metroNIDAZOLE (METROCREAM) 0.75 % cream APPLY TOPICALLY TWO TIMES A DAY TO FACE 45 g 5     No current [facility]-ordered facility-administered medications on file.          Family History      The patient denies a family mental health treatment .  Family medical history includes   Family History   Problem Relation Age of Onset     Thyroid Disease Mother      Graves disease     Hypertension Mother      DIABETES Mother      type 2     Blood Disease Mother       from leukemia at age 78     DIABETES Daughter      HEART DISEASE Father 62     MV problem with CHF   "at 62/ from MI     Hypertension Father      Cardiovascular Father      CANCER Maternal Uncle      type ??     CANCER Maternal Uncle      liver     Pt denies Diabetes with mother.  Pt reports hard to recall family medical hx after concussion.    Social History   The patient was raised in [place], [place].   She has 1 brother and 1 sister.  Pt is the oldest of 3. Parents were  for long time.  Trauma history includes none.   The patient is  x 40 years and has 2 adult children (33 daughter and 31 son).    Pt reports family is significant source of support for her.  She  lives in [place], [place].    She  completed high school and did not participate in special education classes. Post high school education includes bachelors and master's degree..  She is currently retired.   She has not had involvement with the legal system.   She has not served in the .   The patient reports the following spiritual and/or cultural history: none.  Finances are stable and basic needs are met.    Review of Systems     Pertinent items are noted in HPI.    Results      Pertinent findings on review include: Review of records with relevant information reported in the HPI.    VS: /82  Pulse 97  Wt 68 kg (150 lb)  LMP 2001  BMI 22.73 kg/m2    Mental Status Exam     Appearance:  Casually dressed and Well groomed  Behavior/relationship to examiner/demeanor:  Cooperative, Engaged and Pleasant  Motor activity/EPS:  Normal  Gait:  Normal  Speech rate:  Normal  Speech volume:  Normal  Speech coherence:  Normal  Speech spontaneity:  Normal  Mood (subjective report):  \"okay\"  Affect (objective appearance):  Appropriate/mood-congruent  Thought Process (Associations):  Goal directed and Perseverative  Thought process (Rate):  Slowed  Thought content:  no evidence of suicidal or homicidal ideation, no overt psychosis and denies suicidal ideation, intent or thoughts  Abnormal Perception:  None  Sensorium:  " Alert  Attention/Concentration:  Fair  Memory:  Short-term memory impaired  Language:  Intact  Fund of Knowledge/Intelligence:  Average  Insight:  Adequate  Judgment:  Adequate for safety      Assessment & Plan     Assessment:   Jolynn Haji is a 61 year old female who presents for significant anxiety about her health after concussion and insomnia though pt reported some anxiety and insomnia prior to concussion when increased stress.  However, symptoms of anxiety seemed to be exacerbated significantly after concussion on 4/9/2017.  Pt had tried multiple medications in last 1 month for anxiety and sleep that did not seemed to help though pt reported one day improvement of insomnia with Seroquel 50 mg HS PRN last night.  We reviewed current medication and determined addition of Lexapro may be beneficial for the treatment of anxiety and Remeron for insomnia.  We discussed Lexapro may take up to 4 weeks to see improvement in anxiety.  Pt was ruminating and perseverant about her sleep and reassured that if she does not feel any improvement in insomnia, she should contact this writer in 3 days.  But will check in with her tomorrow via MyChart and phone calls (as MyChart increases anxiety).  We also discussed that Remeron may also be beneficial in increasing her appetite and anxiety.  It was also determined that Seroquel will be discontinued at this time due to possible cardiac and metabolic effects in the future.  Also encouraged pt to continue current medication regimen and avoid Ativan to avoid fall at night.    Diagnosis  Axis 1: general anxiety disorder vs anxiety due to medical condition, insomnia  Axis 2: deferred  Axis 3: See problem list in the medical record  Axis 4: recent health problem  Axis 5: 41-50  Serious symptoms, impaired functioning    Plan:    Medication: Start Lexapro 10 mg daily for anxiety and Remeron 15 mg HS for insomnia. Discontinue Seroquel. May use Lorazepam 1mg PRN  OTC Recommendations:  Continue Tylenol as need for headache  Lab Orders:  none  Referrals: none  Release of Information: none  Future Treatment Considerations: per symptoms  Return for Follow Up: return in 3 weeks    The plan of care was reviewed and discussed with Jolynn Haji.  This included risks,benefits, efficacy, dose, side effects and length of treatment. she agreed with the plan and verbalized understanding.  Patient was given phone number and contact information for the clinic and encouraged to call if she has concerns prior to the follow up appointment.The patient understands to call 911 or come to the nearest ED if life threatening or urgent symptoms present. The patient understands the risks of using street drugs or alcohol.    Jaycee Russo, Psychiatric/Mental Health Nurse Practitioner Student, 5/23/2017    Kim FREITAS CNS  5/23/2017                                       Male

## 2023-06-13 ENCOUNTER — VIRTUAL VISIT (OUTPATIENT)
Dept: BEHAVIORAL HEALTH | Facility: CLINIC | Age: 68
End: 2023-06-13
Payer: COMMERCIAL

## 2023-06-13 DIAGNOSIS — F39 MOOD DISORDER (H): ICD-10-CM

## 2023-06-13 DIAGNOSIS — G47.00 INSOMNIA, UNSPECIFIED TYPE: ICD-10-CM

## 2023-06-13 DIAGNOSIS — F41.1 GENERALIZED ANXIETY DISORDER: Primary | ICD-10-CM

## 2023-06-13 PROCEDURE — 90834 PSYTX W PT 45 MINUTES: CPT | Mod: VID | Performed by: SOCIAL WORKER

## 2023-06-13 NOTE — PROGRESS NOTES
[facility] Behavioral [facility]  June 13, 2023      Behavioral Health Clinician Progress Note    Patient Name: Jolynn Haji           Service Type:  Individual      Service Location:   MyChart / Email (patient reached)     Session Start Time: 11:00 am   Session End Time: 11:42 am      Session Length: 38 - 52      Attendees: Patient     Service Modality:  Video Visit:      Provider verified identity through the following two step process.  Patient provided:  Patient is known previously to provider    Telemedicine Visit: The patient's condition can be safely assessed and treated via synchronous audio and visual telemedicine encounter.      Reason for Telemedicine Visit: Patient has requested telehealth visit    Originating Site (Patient Location): Patient's home    Distant Site (Provider Location): [facility]    Consent:  The patient/guardian has verbally consented to: the potential risks and benefits of telemedicine (video visit) versus in person care; bill my insurance or make self-payment for services provided; and responsibility for payment of non-covered services.     Patient would like the video invitation sent by:  My Chart    Mode of Communication:  Video Conference via Amwell    As the provider I attest to compliance with applicable laws and regulations related to telemedicine.    Visit Activities (Refresh list every visit): [facility] Only    Diagnostic Assessment Date: 9/27/22  Treatment Plan Review Date: 7/27/23  See Flowsheets for today's PHQ-9 and TIFFANY-7 results  Previous PHQ-9:       4/26/2023    11:16 AM 5/3/2023     1:18 PM 6/12/2023     8:47 PM   PHQ-9 SCORE   PHQ-9 Total Score MyChart 1 (Minimal depression) 1 (Minimal depression) 1 (Minimal depression)   PHQ-9 Total Score 1 1 1     Previous TIFFANY-7:       3/21/2023    11:20 AM 3/23/2023     5:33 PM 5/3/2023     1:18 PM   TIFFANY-7 SCORE   Total Score 1 (minimal anxiety) 5 (mild anxiety) 3  (minimal anxiety)   Total Score 1 5 3       KARINA LEVEL:      8/27/2010     3:00 PM 2/22/2011    11:00 AM   KARINA Score (Last Two)   KARINA Raw Score 51 49   Activation Score 91.6 82.8   KARINA Level 4 4       DATA  Extended Session (60+ minutes): No  Interactive Complexity: No  Crisis: No  H Patient: No    Treatment Objective(s) Addressed in This Session:  Target Behavior(s): anxiety and depression    Depressed Mood: Increase interest, engagement, and pleasure in doing things  Decrease frequency and intensity of feeling down, depressed, hopeless  Improve quantity and quality of night time sleep / decrease daytime naps  Feel less tired and more energy during the day   Improve diet, appetite, mindful eating, and / or meal planning  Identify negative self-talk and behaviors: challenge core beliefs, myths, and actions  Improve concentration, focus, and mindfulness in daily activities   Feel less fidgety, restless or slow in daily activities / interpersonal interactions  Anxiety: will experience a reduction in anxiety, will develop more effective coping skills to manage anxiety symptoms, will develop healthy cognitive patterns and beliefs and will increase ability to function adaptively    Current Stressors / Issues:  Reporting [place]. Went to graduation party over the weekend with many people. This went well with conversation. Did have some conversations that were uncomfortable with individuals who expressed concern over how she was a year ago. Discussed role with friend who is suffering with MH. Processed advantages and disadvantages of involving self further in her health care decisions.     5/31/23  Reported she had a [facility] [place] [facility] Reporting they have followed up with husbands pancreas. MDs have recommended removing it completely. They have to make many decisions with his health. Anxiety has been high  as a result. They have time to make decisions but all options are not ideal. Discussed maintaining healthy skills for dealing with anxiety.       5/17/23  Went to see friend with high anxiety. She said conversation was good and she was able to help with relating to her. Discussed challenging self to drive further distances. This has been going really well for her. Discussed husbands doctors appt and decision to have surgery or not.      5/11/23 ([facility])  MH update: Reports feeling more clear headed on trazodone and off Ambien. She is taking half the pill at night. Headaches have been less and less sever. No change in sleep. She has been more active in doing things she would have done over a year ago. Not stressing about the cabin remodel. She has been spending time with grandchildren. Notes family has really noticed difference in her mental health. Reports reading recommended books by [facility]. Going to see friend struggling with mental health.   [place]  Appetite: unremarkable  Sleep: unremarkable  Therapy: weekly therapy  FARIDA: na  Preg: na  Most important: Does it matter if she alters the dose of trazodone from day to day.     5/1/23  Reports they drove to the [employer] this weekend. Feels things are progressing well. Discussed switching medication for sleep to Trazodone. She has been sleeping the same on Ambien. She is taking a half pill and not having any grogginess. She has been engaged with friends and Rastafarian. Recommended Gifts of Imperfections and I Thought It Was Just Me by Darrick Arteaga. Also Decluttering Your Mind by CAT Melgar. Discussed having others notice positive changes in her. She struggles with having that spot light on her.       4/27/23 ([facility])  MH update: doing well with heightened anxiety but not panic attacks.   Stresses: [employer] renovations   Appetite: unremarkable  Sleep: unremarkable  Therapy: seeing therapist weekly, reports sister came for several days that was great. Making  "decision about traveling for family wedding.   FARIDA: na  Preg: na  Most important: continues to have headaches daily. Wants to discuss Ambien with MD/PCP suggested getting off completely. Would like to have Propanol for 10 mg to not have to cut.      4/19/23  Reports managing her anxiety around [place] has been difficult. Discussed desire for control and letting go.Reports sister is coming for several days.  is having surgery tomorrow. She has been walking and exercising more.      4/12/23  Reports it she is doing well and had a great Easter with family. Discussed more about her anxiety (ABC), being self blaming (what ifs/should have) at nature but at other times unknown source. Discussed eliminating \"should' for vocabulary due to shame, self judgement, and regret associated with the word. She will continue to work on ABC and look at the use of should.     3/31/23  Reports she is doing well. Anxiety has been pretty good but has headaches when anxious. Looking forward to going to theater with grandchildren. Medications have been very helpful. Discussed ABC of Anxiety. Discussed events of anxiety at its worse. Discussed the beliefs she has about anxiety events. Discussed consequences of event.     3/23/23 [facility]  MH update: Reports anxiety has decreased a lot on the propanol. Continues to have headaches but only about half the days. She has noticed a stronger correlation with headaches and anxiety. She completed Neuro Psych testing. Relieved she has no new dx. Planning on walking with dog. She is feeling more like her self, \"5 star days.\"    Stresses: Neuro psych testing and going to wake alone was stressful. Felt she managed very well.   Appetite: unremarkable  Sleep: unremarkable  Therapy: Continues to do weekly therapy. Alvarado she worried a lot about testing and did not , lesson in not worrying about things that have not happened yet. Feeling very accomplished after attending a wake " "alone without her . She stated it went very well and anxiety was lower than normal and then expected. Discussed decision making in the moment. Discussed it can be difficult to make decisions in the moment. Continues with coping skills, ie meditation and breathing tch.   FARIDA: na  Preg: na  Most important: Wants to address doses of medications.     3/14/23  Feels the propanol has been really helping with anxiety. She has been feeling like her self for the past 5 days. Continues to have several headaches. She is considering going back to yoga after looking as some neck stretches.     3/9/23 ([facility])  She has been more anxious on the different dosing of medications. She has also not noticed decrease in headaches. Discussed neck tension and using body scanning. She continues to feel a bit foggy in the afternoons.     3/3/23  Reports doing well. States she has been in communication with MD about headaches. Has noticed on days when she has lower dose of med she has more anxiety. She has been examining her anxiety when going out socially. Does not feel it is associated with the socializing itself but rather \"will the anxiety come up.\" Discussed upcoming appt for neuro psych testing. Is hesitant but willing to do testing. May want to talk to MD about it prior appt. Fearful about Drug Induced Parkinson's.       2/17/23  Pt reports she is doing well. Reports anxiety has been better. Able to control it better with accepting and reognition of anxiety. Continues to utilize meditation and review of skills taught in therapy. Ice packs has been helping with headaches. Self talk has much improved and has gained more confidence. Anxiety in the evening have improved but still feeling the anxiety at times. She has been wanting to work exercise into her daily routine. Sleep has been also improve. ID values: umm, family, self respect responsibility and kindness.       2/10/23  Pt reports she had such a good time with her trip to " "her sisters house. Much reduced anxious than expected (compared to home) and sleep was really good. Discussed implementing things that worked at sisters for home. Continues to have some anxiety when going into store or before events. Reports panic attacks are less than previous week. Discussed triggers for anxiety. Discussed do expectations of self meet values.  ID values from next time and look at expectations for self. Pt asked \"What could I do better to be good enough\". Struggles with being good enough for self. Talked about list of things she wants to achieve in order to feel \"good enough\". One being able to go to places with out wondering if she will be anxious.     Identified Triggers for anxiety and background thought behind it:  More anxious when tired: less bandwidth to utilize skills  Feeling going to be late  Making quick decisions  Technology   organizing big meal for company: what people expect of her  MD visits or waiting for visit: anticipations of events at MD, has not had poor experiences, husbands medical experience   What if something happens to [employer]   Packing groceries and people are behind her  Sometimes going out to eat (much better): menu with too many options and having to make decisions  Holiday: anticipation and wanting it to be perfect for people/what ng people expect of her  Dealing with insurance problem: hassle and being out of her control   Being in the unknown about things: Slowness of recovery from anxiety, what ifs  Do people see I am anxious  Large crowd of people: leading up to and during (highest), people might be noticing her, dont want to stand   *Things that need to be done but are not done(floor needing to be washed, getting decorations down etc): likes list to check off (what to accomplish that week), Not accomplishing goal. Self placed expectations (high standards from growing up), perfectionism (mom perfectionism as a child), some self comparisons to others, taking on " too much during a week, and expecting to be like old self all the time (compare self to old self).     2/1/23  Pt is reporting she is doing well. She has been out driving to do shopping and seeing friends. Continues to have improvement with anxiety each day. Continues to have headaches but is wanting to change medications until she gets back from trip to her sisters home. Having some anxiety about spending over night for the first time since COVID. Discussed steps to addressing anxiety at her sisters home and drive up. 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 Became dizzy and become worried about the physical sx she was experiencing. Anxiety grew from that point on. Discussed how she handled it in the moment with redirecting thoughts, accepting anxiety and attempting some of the anxiety tech discussed. Pt will explore other situations in which anxiety tends to arise for next session. Pt has been keeping a journal of her daily experience and will reexamine journal to better understand anxiety. Pt listed several situations she knows brings anxiety.      Identified Triggers:  Feeling rushed  Not wanting to be late  Having others do responsibilities she once carried as her responsibility  Being flexible  Expectations of others  What do others see/think of her when experiencing panic attack    1/23/23  Reports she is doing really well. Reports she will be driving up to see her grandson. She is highly looking forward to this and making [employer] cards. Some anxiety with going in to the grocery store but not once she gets started shopping. Reports her ultimate goal would be to go to the mall alone with traffic as that has been causing anxiety. Headaches continue to be of concern to her. Discussed further and identified them coming following either a panic attack or after high anxiety. Discussed using sensory tech for elevating the anxiety and also for following headache such as heating  pad for neck or eyes. Discussed her conversation with Dr. Pompa about possibly changing meds to help with headaches. She reports relishing in current progress and is not wanting meds to change at this time. Headaches do interfere sometimes with daily life. She will be going to lunch with several college friends. This has caused a bit of anxiety in accepting the invite. Normalized experience for pt.       Progress on Treatment Objective(s) / Homework:  Satisfactory progress - ACTION (Actively working towards change); Intervened by reinforcing change plan / affirming steps taken    Motivational Interviewing    MI Intervention: Expressed Empathy/Understanding, Supported Autonomy, Collaboration, Evocation, Open-ended questions, Change talk (evoked) and Reframe     Change Talk Expressed by the Patient: Desire to change Ability to change Reasons to change Need to change    Provider Response to Change Talk: E - Evoked more info from patient about behavior change, A - Affirmed patient's thoughts, decisions, or attempts at behavior change, R - Reflected patient's change talk and S - Summarized patient's change talk statements      Care Plan review completed: Yes    Medication Review:  No changes to current psychiatric medication(s)    Medication Compliance:  Yes    Changes in Health Issues:   None reported    Chemical Use Review:   Substance Use: Chemical use reviewed, no active concerns identified      Tobacco Use: No current tobacco use.      Assessment: Current Emotional / Mental Status (status of significant symptoms):  Risk status (Self / Other harm or suicidal ideation)  Patient denies a history of suicidal ideation, suicide attempts, self-injurious behavior, homicidal ideation, homicidal behavior and and other safety concerns  Patient denies current fears or concerns for personal safety.  Patient denies current or recent suicidal ideation or behaviors.  Patient denies current or recent homicidal ideation or  behaviors.  Patient denies current or recent self injurious behavior or ideation.  Patient denies other safety concerns.  A safety and risk management plan has not been developed at this time, however patient was encouraged to call [place] [phone] should there be a change in any of these risk factors.    Appearance:   Appropriate   Eye Contact:   Good   Psychomotor Behavior: Normal   Attitude:   Cooperative   Orientation:   All  Speech   Rate / Production: Normal    Volume:  Normal   Mood:    Anxious   Affect:    Appropriate   Thought Content:  Clear   Thought Form:  Coherent  Logical   Insight:    Good     Diagnoses:  1. Generalized anxiety disorder    2. Insomnia, unspecified type    3. Mood disorder (H)        Collateral Reports Completed:  Not Applicable    Plan: (Homework, other):  Patient was given information about behavioral services and encouraged to schedule a follow up appointment with the clinic [facility] in 1 week.  She was also given information about mental health symptoms and treatment options .  CD Recommendations: No indications of CD issues.  Erika Nolan [facility] LICSW      ______________________________________________________________________    Integrated Primary Care Behavioral Health Treatment Plan    Patient's Name: Jolynn Haji  YOB: 1955    Date of Creation: 10/5/22  Date Treatment Plan Last Reviewed/Revised: 4/27/23    DSM5 Diagnoses: 300.02 (F41.1) Generalized Anxiety Disorder  Psychosocial / Contextual Factors: Individual Factors high anxiety associated with depressive sx, interfereing with ability to function as she would like.  .  PROMIS (reviewed every 90 days):     Referral / Collaboration:  Was/were discussed and patient will pursue. - individual therapy    Anticipated number of session for this episode of care: 5-8 sessions  Anticipation frequency of session: Weekly  Anticipated Duration of each session: 38-52 minutes  Treatment plan will be reviewed in 90 days or when goals  have been changed.       MeasurableTreatment Goal(s) related to diagnosis / functional impairment(s)  Goal 1: Patient will work with provider to manage symptoms    I will know I've met my goal when when less anxious and feeling down.      Objective #A (Patient Action)    Patient will  attend all sessions.  Status: Continued - Date(s): 4/27/23    Intervention(s)  Therapist will teach educate patient on treatment options, clarify concerns, work with pt to overcome any resistance to compliance..    Objective #B  Patient will identify 3 fears / thoughts that contribute to feeling anxious.  Status: Continued - Date(s): 4/27/23     Intervention(s)  Therapist will educate patient on treatment options, clarify concerns, work with pt to overcome any resistance to compliance..    Objective #C  Patient will identify 3 initial signs or symptoms of anxiety.  Status: Continued - Date(s): 4/27/23     Intervention(s)  Therapist will educate patient on treatment options, clarify concerns, work with pt to overcome any resistance to compliance..        Patient has reviewed and agreed to the above plan.      Erika Francisco [facility]  June 13, 2023  Answers for HPI/ROS submitted by the patient on 12/6/2022  TIFFANY 7 TOTAL SCORE: 3  Answers for HPI/ROS submitted by the patient on 3/8/2023  If you checked off any problems, how difficult have these problems made it for you to do your work, take care of things at home, or get along with other people?: Not difficult at all  PHQ9 TOTAL SCORE: 5  Answers for HPI/ROS submitted by the patient on 4/26/2023  If you checked off any problems, how difficult have these problems made it for you to do your work, take care of things at home, or get along with other people?: Somewhat difficult  PHQ9 TOTAL SCORE: 1  Answers for HPI/ROS submitted by the patient on 6/12/2023  If you checked off any problems, how difficult have these problems made it for you to do your work, take care of things at home, or get along  with other people?: Not difficult at all  PHQ9 TOTAL SCORE: 1

## 2023-06-20 NOTE — PATIENT INSTRUCTIONS
Patient Education   Personalized Prevention Plan  You are due for the preventive services outlined below.  Your care team is available to assist you in scheduling these services.  If you have already completed any of these items, please share that information with your care team to update in your medical record.  Health Maintenance Due   Topic Date Due     FALL RISK ASSESSMENT  08/18/2019     Basic Metabolic Panel  11/30/2019     Kidney Microalbumin Urine Test  11/30/2019     Pneumococcal Vaccine (1 of 2 - PCV13) 08/18/2019     Preventive Health Recommendations    See your health care provider every year to    Review health changes.     Discuss preventive care.      Review your medicines if your doctor has prescribed any.    You no longer need a yearly Pap test unless you've had an abnormal Pap test in the past 10 years. If you have vaginal symptoms, such as bleeding or discharge, be sure to talk with your provider about a Pap test.    Every 1 to 2 years, have a mammogram.  If you are over 69, talk with your health care provider about whether or not you want to continue having screening mammograms.    Every 10 years, have a colonoscopy. Or, have a yearly FIT test (stool test). These exams will check for colon cancer.     Have a cholesterol test every 5 years, or more often if your doctor advises it.     Have a diabetes test (fasting glucose) every three years. If you are at risk for diabetes, you should have this test more often.     At age 65, have a bone density scan (DEXA) to check for osteoporosis (brittle bone disease).    Shots:    Get a flu shot each year.    Get a tetanus shot every 10 years.    Talk to your doctor about your pneumonia vaccines. There are now two you should receive - Pneumovax (PPSV 23) and Prevnar (PCV 13).    Talk to your pharmacist about the shingles vaccine.    Talk to your doctor about the hepatitis B vaccine.    Nutrition:     Eat at least 5 servings of fruits and vegetables each  day.    Eat whole-grain bread, whole-wheat pasta and brown rice instead of white grains and rice.    Get adequate Calcium and Vitamin D.     Lifestyle    Exercise at least 150 minutes a week (30 minutes a day, 5 days a week). This will help you control your weight and prevent disease.    Limit alcohol to one drink per day.    No smoking.     Wear sunscreen to prevent skin cancer.     See your dentist twice a year for an exam and cleaning.    See your eye doctor every 1 to 2 years to screen for conditions such as glaucoma, macular degeneration and cataracts.    Personalized Prevention Plan  You are due for the preventive services outlined below.  Your care team is available to assist you in scheduling these services.  If you have already completed any of these items, please share that information with your care team to update in your medical record.  Health Maintenance Due   Topic Date Due     FALL RISK ASSESSMENT  08/18/2019     Basic Metabolic Panel  11/30/2019     Kidney Microalbumin Urine Test  11/30/2019     Pneumococcal Vaccine (1 of 2 - PCV13) 08/18/2019         Split-Thickness Skin Graft Text: The defect edges were debeveled with a #15 scalpel blade.  Given the location of the defect, shape of the defect and the proximity to free margins a split thickness skin graft was deemed most appropriate.  Using a sterile surgical marker, the primary defect shape was transferred to the donor site. The split thickness graft was then harvested.  The skin graft was then placed in the primary defect and oriented appropriately.

## 2023-06-26 NOTE — PROGRESS NOTES
[facility]  June 27, 2023      Behavioral Health Clinician Progress Note    Patient Name: Jolynn Haji           Service Type:  Individual      Service Location:   MyChart / Email (patient reached)     Session Start Time: 1:30 pm   Session End Time: 2:12 pm      Session Length: 38 - 52      Attendees: Patient     Service Modality:  Video Visit:      Provider verified identity through the following two step process.  Patient provided:  Patient is known previously to provider    Telemedicine Visit: The patient's condition can be safely assessed and treated via synchronous audio and visual telemedicine encounter.      Reason for Telemedicine Visit: Patient has requested telehealth visit    Originating Site (Patient Location): Patient's home    Distant Site (Provider Location): [facility]    Consent:  The patient/guardian has verbally consented to: the potential risks and benefits of telemedicine (video visit) versus in person care; bill my insurance or make self-payment for services provided; and responsibility for payment of non-covered services.     Patient would like the video invitation sent by:  My Chart    Mode of Communication:  Video Conference via [employer]well    As the provider I attest to compliance with applicable laws and regulations related to telemedicine.    Visit Activities (Refresh list every visit): [facility] Only    Diagnostic Assessment Date: 9/27/22  Treatment Plan Review Date: 7/27/23  See Flowsheets for today's PHQ-9 and TIFFANY-7 results  Previous PHQ-9:       4/26/2023    11:16 AM 5/3/2023     1:18 PM 6/12/2023     8:47 PM   PHQ-9 SCORE   PHQ-9 Total Score MyChart 1 (Minimal depression) 1 (Minimal depression) 1 (Minimal depression)   PHQ-9 Total Score 1 1 1     Previous TIFFANY-7:       3/21/2023    11:20 AM 3/23/2023     5:33 PM 5/3/2023     1:18 PM   TIFFANY-7 SCORE   Total Score 1 (minimal anxiety) 5 (mild anxiety) 3  (minimal anxiety)   Total Score 1 5 3       KARINA LEVEL:      8/27/2010     3:00 PM 2/22/2011    11:00 AM   KARINA Score (Last Two)   KARINA Raw Score 51 49   Activation Score 91.6 82.8   KARINA Level 4 4       DATA  Extended Session (60+ minutes): No  Interactive Complexity: No  Crisis: No  H Patient: No    Treatment Objective(s) Addressed in This Session:  Target Behavior(s): anxiety and depression    Depressed Mood: Increase interest, engagement, and pleasure in doing things  Decrease frequency and intensity of feeling down, depressed, hopeless  Improve quantity and quality of night time sleep / decrease daytime naps  Feel less tired and more energy during the day   Improve diet, appetite, mindful eating, and / or meal planning  Identify negative self-talk and behaviors: challenge core beliefs, myths, and actions  Improve concentration, focus, and mindfulness in daily activities   Feel less fidgety, restless or slow in daily activities / interpersonal interactions  Anxiety: will experience a reduction in anxiety, will develop more effective coping skills to manage anxiety symptoms, will develop healthy cognitive patterns and beliefs and will increase ability to function adaptively    Current Stressors / Issues:  Reports several nights of 8-9hs a night. Feels more clear headed, better memory, and more energized. Discussed times of higher anxiety or racing thoughts in public settings. Discussed ways of lowering anxiety in those events.      6/13/23  Reporting [place]. Went to graduation party over the weekend with many people. This went well with conversation. Did have some conversations that were uncomfortable with individuals who expressed concern over how she was a year ago. Discussed role with friend who is suffering with MH. Processed advantages and disadvantages of involving self further in her health care decisions.     5/31/23  Reported she  had a wonderful Memorial Day weekend with family at the [facility]. Reporting they have followed up with husbands pancreas. MDs have recommended removing it completely. They have to make many decisions with his health. Anxiety has been high as a result. They have time to make decisions but all options are not ideal. Discussed maintaining healthy skills for dealing with anxiety.       5/17/23  Went to see friend with high anxiety. She said conversation was good and she was able to help with relating to her. Discussed challenging self to drive further distances. This has been going really well for her. Discussed husbands doctors appt and decision to have surgery or not.      [facility]  [facility] update: Reports feeling more clear headed on trazodone and off Ambien. She is taking half the pill at night. Headaches have been less and less sever. No change in sleep. She has been more active in doing things she would have done over a year ago. [facility] She has been spending time with grandchildren. Notes family has really noticed difference in her mental health. Reports reading recommended books by [facility]. Going to see friend struggling with mental health.   Stresses: [facility]  Appetite: unremarkable  Sleep: unremarkable  Therapy: weekly therapy  FARIDA: na  Preg: na  Most important: Does it matter if she alters the dose of trazodone from day to day.     5/1/23  Reports they drove to the [facility] [place] this weekend. Feels things are progressing well. Discussed switching medication for sleep to Trazodone. She has been sleeping the same on Ambien. She is taking a half pill and not having any grogginess. She has been engaged with friends and Taoist. Recommended Gifts of Imperfections and I Thought It Was Just Me by Darrick Arteaga. Also Decluttering Your Mind by CAT Melgar. Discussed having others notice positive changes in her. She struggles with having that spot light on her.       4/27/23 ([facility])  MH update:  "doing well with heightened anxiety but not panic attacks.   [place]   Appetite: unremarkable  Sleep: unremarkable  Therapy: seeing therapist weekly, reports sister came for several days that was great. Making decision about traveling for family wedding.   FARIDA: na  Preg: na  Most important: continues to have headaches daily. Wants to discuss Ambien with MD/PCP suggested getting off completely. Would like to have Propanol for 10 mg to not have to cut.      4/19/23  Reports managing her anxiety around [place] has been difficult. Discussed desire for control and letting go.Reports sister is coming for several days.  is having surgery tomorrow. She has been walking and exercising more.      4/12/23  Reports it she is doing well and had a great Easter with family. Discussed more about her anxiety (ABC), being self blaming (what ifs/should have) at nature but at other times unknown source. Discussed eliminating \"should' for vocabulary due to shame, self judgement, and regret associated with the word. She will continue to work on ABC and look at the use of should.     3/31/23  Reports she is doing well. Anxiety has been pretty good but has headaches when anxious. Looking forward to going to theater with grandchildren. Medications have been very helpful. Discussed ABC of Anxiety. Discussed events of anxiety at its worse. Discussed the beliefs she has about anxiety events. Discussed consequences of event.     [facility]  [facility]: Reports anxiety has decreased a lot on the propanol. Continues to have headaches but only about half the days. She has noticed a stronger correlation with headaches and anxiety. She completed Neuro Psych testing. Relieved she has no new dx. Planning on walking with dog. She is feeling more like her self, \"5 star days.\"    Stresses: Neuro psych testing and going to wake alone was stressful. Felt she managed very well.   Appetite: " "unremarkable  Sleep: unremarkable  Therapy: Continues to do weekly therapy. [place] she worried a lot about testing and did not , lesson in not worrying about things that have not happened yet. Feeling very accomplished after attending a wake alone without her . She stated it went very well and anxiety was lower than normal and then expected. Discussed decision making in the moment. Discussed it can be difficult to make decisions in the moment. Continues with coping skills, ie meditation and breathing tch.   FARIDA: na  Preg: na  Most important: Wants to address doses of medications.     3/14/23  Feels the propanol has been really helping with anxiety. She has been feeling like her self for the past 5 days. Continues to have several headaches. She is considering going back to yoga after looking as some neck stretches.     3/9/23 ([facility])  She has been more anxious on the different dosing of medications. She has also not noticed decrease in headaches. Discussed neck tension and using body scanning. She continues to feel a bit foggy in the afternoons.     3/3/23  Reports doing well. States she has been in communication with MD about headaches. Has noticed on days when she has lower dose of med she has more anxiety. She has been examining her anxiety when going out socially. Does not feel it is associated with the socializing itself but rather \"will the anxiety come up.\" Discussed upcoming appt for neuro psych testing. Is hesitant but willing to do testing. May want to talk to MD about it prior appt. Fearful about Drug Induced Parkinson's.       2/17/23  Pt reports she is doing well. Reports anxiety has been better. Able to control it better with accepting and reognition of anxiety. Continues to utilize meditation and review of skills taught in therapy. Ice packs has been helping with headaches. Self talk has much improved and has gained more confidence. Anxiety in the evening have improved but still feeling the " "anxiety at times. She has been wanting to work exercise into her daily routine. Sleep has been also improve. ID values: umm, family, self respect responsibility and kindness.       2/10/23  Pt reports she had such a good time with her trip to her sisters house. Much reduced anxious than expected (compared to home) and sleep was really good. Discussed implementing things that worked at sisters for home. Continues to have some anxiety when going into store or before events. Reports panic attacks are less than previous week. Discussed triggers for anxiety. Discussed do expectations of self meet values.  ID values from next time and look at expectations for self. Pt asked \"What could I do better to be good enough\". Struggles with being good enough for self. Talked about list of things she wants to achieve in order to feel \"good enough\". One being able to go to places with out wondering if she will be anxious.     Identified Triggers for anxiety and background thought behind it:  More anxious when tired: less bandwidth to utilize skills  Feeling going to be late  Making quick decisions  Technology   organizing big meal for company: what people expect of her  MD visits or waiting for visit: anticipations of events at MD, has not had poor experiences, husbands medical experience   What if something happens to Zach   Packing groceries and people are behind her  Sometimes going out to eat (much better): menu with too many options and having to make decisions  Holiday: anticipation and wanting it to be perfect for people/what ng people expect of her  Dealing with insurance problem: hassle and being out of her control   Being in the unknown about things: Slowness of recovery from anxiety, what ifs  Do people see I am anxious  Large crowd of people: leading up to and during (highest), people might be noticing her, dont want to stand   *Things that need to be done but are not done(floor needing to be washed, getting " decorations down etc): likes list to check off (what to accomplish that week), Not accomplishing goal. Self placed expectations (high standards from growing up), perfectionism (mom perfectionism as a child), some self comparisons to others, taking on too much during a week, and expecting to be like old self all the time (compare self to old self).     2/1/23  Pt is reporting she is doing well. She has been out driving to do shopping and seeing friends. Continues to have improvement with anxiety each day. Continues to have headaches but is wanting to change medications until she gets back from trip to her sisters home. Having some anxiety about spending over night for the first time since COVID. Discussed steps to addressing anxiety at her sisters home and drive up. 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 Became dizzy and become worried about the physical sx she was experiencing. Anxiety grew from that point on. Discussed how she handled it in the moment with redirecting thoughts, accepting anxiety and attempting some of the anxiety tech discussed. Pt will explore other situations in which anxiety tends to arise for next session. Pt has been keeping a journal of her daily experience and will reexamine journal to better understand anxiety. Pt listed several situations she knows brings anxiety.      Identified Triggers:  Feeling rushed  Not wanting to be late  Having others do responsibilities she once carried as her responsibility  Being flexible  Expectations of others  What do others see/think of her when experiencing panic attack    1/23/23  Reports she is doing really well. Reports she will be driving up to see her grandson. She is highly looking forward to this and making [employer] cards. Some anxiety with going in to the grocery store but not once she gets started shopping. Reports her ultimate goal would be to go to the mall alone with traffic as that has been causing  anxiety. Headaches continue to be of concern to her. Discussed further and identified them coming following either a panic attack or after high anxiety. Discussed using sensory tech for elevating the anxiety and also for following headache such as heating pad for neck or eyes. Discussed her conversation with Dr. Pompa about possibly changing meds to help with headaches. She reports relishing in current progress and is not wanting meds to change at this time. Headaches do interfere sometimes with daily life. She will be going to lunch with several college friends. This has caused a bit of anxiety in accepting the invite. Normalized experience for pt.       Progress on Treatment Objective(s) / Homework:  Satisfactory progress - ACTION (Actively working towards change); Intervened by reinforcing change plan / affirming steps taken    Motivational Interviewing    MI Intervention: Expressed Empathy/Understanding, Supported Autonomy, Collaboration, Evocation, Open-ended questions, Change talk (evoked) and Reframe     Change Talk Expressed by the Patient: Desire to change Ability to change Reasons to change Need to change    Provider Response to Change Talk: E - Evoked more info from patient about behavior change, A - Affirmed patient's thoughts, decisions, or attempts at behavior change, R - Reflected patient's change talk and S - Summarized patient's change talk statements      Care Plan review completed: Yes    Medication Review:  No changes to current psychiatric medication(s)    Medication Compliance:  Yes    Changes in Health Issues:   None reported    Chemical Use Review:   Substance Use: Chemical use reviewed, no active concerns identified      Tobacco Use: No current tobacco use.      Assessment: Current Emotional / Mental Status (status of significant symptoms):  Risk status (Self / Other harm or suicidal ideation)  Patient denies a history of suicidal ideation, suicide attempts, self-injurious behavior,  homicidal ideation, homicidal behavior and and other safety concerns  Patient denies current fears or concerns for personal safety.  Patient denies current or recent suicidal ideation or behaviors.  Patient denies current or recent homicidal ideation or behaviors.  Patient denies current or recent self injurious behavior or ideation.  Patient denies other safety concerns.  A safety and risk management plan has not been developed at this time, however patient was encouraged to call [place] [phone] should there be a change in any of these risk factors.    Appearance:   Appropriate   Eye Contact:   Good   Psychomotor Behavior: Normal   Attitude:   Cooperative   Orientation:   All  Speech   Rate / Production: Normal    Volume:  Normal   Mood:    Anxious   Affect:    Appropriate   Thought Content:  Clear   Thought Form:  Coherent  Logical   Insight:    Good     Diagnoses:  1. Generalized anxiety disorder    2. Insomnia, unspecified type    3. Mood disorder (H)        Collateral Reports Completed:  Not Applicable    Plan: (Homework, other):  Patient was given information about behavioral services and encouraged to schedule a follow up appointment with the clinic [facility] in 1 week.  She was also given information about mental health symptoms and treatment options .  CD Recommendations: No indications of CD issues.  Erika Mehtaefer [facility]      ______________________________________________________________________    Integrated Primary Care Behavioral Health Treatment Plan    Patient's Name: Jolynn Haji  YOB: 1955    Date of Creation: 10/5/22  Date Treatment Plan Last Reviewed/Revised: 4/27/23    DSM5 Diagnoses: 300.02 (F41.1) Generalized Anxiety Disorder  Psychosocial / Contextual Factors: Individual Factors high anxiety associated with depressive sx, interfereing with ability to function as she would like.  .  PROMIS (reviewed every 90 days):     Referral / Collaboration:  Was/were discussed and patient will  pursue. - individual therapy    Anticipated number of session for this episode of care: 5-8 sessions  Anticipation frequency of session: Weekly  Anticipated Duration of each session: 38-52 minutes  Treatment plan will be reviewed in 90 days or when goals have been changed.       MeasurableTreatment Goal(s) related to diagnosis / functional impairment(s)  Goal 1: Patient will work with provider to manage symptoms    I will know I've met my goal when when less anxious and feeling down.      Objective #A (Patient Action)    Patient will  attend all sessions.  Status: Continued - Date(s): 4/27/23    Intervention(s)  Therapist will teach educate patient on treatment options, clarify concerns, work with pt to overcome any resistance to compliance..    Objective #B  Patient will identify 3 fears / thoughts that contribute to feeling anxious.  Status: Continued - Date(s): 4/27/23     Intervention(s)  Therapist will educate patient on treatment options, clarify concerns, work with pt to overcome any resistance to compliance..    Objective #C  Patient will identify 3 initial signs or symptoms of anxiety.  Status: Continued - Date(s): 4/27/23     Intervention(s)  Therapist will educate patient on treatment options, clarify concerns, work with pt to overcome any resistance to compliance..        Patient has reviewed and agreed to the above plan.      Erika Francisco [facility]  June 27, 2023  Answers for HPI/ROS submitted by the patient on 12/6/2022  TIFFANY 7 TOTAL SCORE: 3  Answers for HPI/ROS submitted by the patient on 3/8/2023  If you checked off any problems, how difficult have these problems made it for you to do your work, take care of things at home, or get along with other people?: Not difficult at all  PHQ9 TOTAL SCORE: 5  Answers for HPI/ROS submitted by the patient on 4/26/2023  If you checked off any problems, how difficult have these problems made it for you to do your work, take care of things at home, or get along with  other people?: Somewhat difficult  PHQ9 TOTAL SCORE: 1  Answers for HPI/ROS submitted by the patient on 6/12/2023  If you checked off any problems, how difficult have these problems made it for you to do your work, take care of things at home, or get along with other people?: Not difficult at all  PHQ9 TOTAL SCORE: 1

## 2023-06-27 ENCOUNTER — VIRTUAL VISIT (OUTPATIENT)
Dept: BEHAVIORAL HEALTH | Facility: CLINIC | Age: 68
End: 2023-06-27
Payer: COMMERCIAL

## 2023-06-27 DIAGNOSIS — F41.1 GENERALIZED ANXIETY DISORDER: Primary | ICD-10-CM

## 2023-06-27 DIAGNOSIS — G47.00 INSOMNIA, UNSPECIFIED TYPE: ICD-10-CM

## 2023-06-27 DIAGNOSIS — F39 MOOD DISORDER (H): ICD-10-CM

## 2023-06-27 PROCEDURE — 90834 PSYTX W PT 45 MINUTES: CPT | Mod: VID | Performed by: SOCIAL WORKER

## 2023-07-06 NOTE — PROGRESS NOTES
[facility] Integrated Behavioral Health  July 10, 2023      Behavioral Health Clinician Progress Note    Patient Name: Jolynn Haji           Service Type:  Individual      Service Location:   MyChart / Email (patient reached)     Session Start Time: 10:00 am   Session End Time: 10:53 am      Session Length: 53 - 60      Attendees: Patient     Service Modality:  Video Visit:      Provider verified identity through the following two step process.  Patient provided:  Patient is known previously to provider    Telemedicine Visit: The patient's condition can be safely assessed and treated via synchronous audio and visual telemedicine encounter.      Reason for Telemedicine Visit: Patient has requested telehealth visit    Originating Site (Patient Location): Patient's home    Distant Site (Provider Location): [facility]    Consent:  The patient/guardian has verbally consented to: the potential risks and benefits of telemedicine (video visit) versus in person care; bill my insurance or make self-payment for services provided; and responsibility for payment of non-covered services.     Patient would like the video invitation sent by:  My Chart    Mode of Communication:  Video Conference via Amwell    As the provider I attest to compliance with applicable laws and regulations related to telemedicine.    Visit Activities (Refresh list every visit): [facility] Only    Diagnostic Assessment Date: 9/27/22  Treatment Plan Review Date: 7/27/23  See Flowsheets for today's PHQ-9 and TIFFANY-7 results  Previous PHQ-9:       4/26/2023    11:16 AM 5/3/2023     1:18 PM 6/12/2023     8:47 PM   PHQ-9 SCORE   PHQ-9 Total Score MyChart 1 (Minimal depression) 1 (Minimal depression) 1 (Minimal depression)   PHQ-9 Total Score 1 1 1     Previous TIFFANY-7:       3/21/2023    11:20 AM 3/23/2023     5:33 PM 5/3/2023     1:18 PM   TIFFANY-7 SCORE   Total Score 1 (minimal anxiety) 5 (mild anxiety) 3  "(minimal anxiety)   Total Score 1 5 3       KARINA LEVEL:      8/27/2010     3:00 PM 2/22/2011    11:00 AM   KARINA Score (Last Two)   KARINA Raw Score 51 49   Activation Score 91.6 82.8   KARINA Level 4 4       DATA  Extended Session (60+ minutes): No  Interactive Complexity: No  Crisis: No  [facility] Patient: No    Treatment Objective(s) Addressed in This Session:  Target Behavior(s): anxiety and depression    Depressed Mood: Increase interest, engagement, and pleasure in doing things  Decrease frequency and intensity of feeling down, depressed, hopeless  Improve quantity and quality of night time sleep / decrease daytime naps  Feel less tired and more energy during the day   Improve diet, appetite, mindful eating, and / or meal planning  Identify negative self-talk and behaviors: challenge core beliefs, myths, and actions  Improve concentration, focus, and mindfulness in daily activities   Feel less fidgety, restless or slow in daily activities / interpersonal interactions  Anxiety: will experience a reduction in anxiety, will develop more effective coping skills to manage anxiety symptoms, will develop healthy cognitive patterns and beliefs and will increase ability to function adaptively    Current Stressors / Issues:  Reports she had a really good time with family over the 4th of July holiday. Reports anxiety has been good. Reporting some heart palpitations and shallow of breath [place] Discussed utilizing family more to decrease some of her anxiety. She is not doing everything anymore and becoming more comfortable with asking for help. Sleep continues to improve, 7-8hrs. Denies feeling groggy or having a \"hang overs\" in the afternoon. Reduced headaches, tends to come when anxiety is higher. Feels headaches are much more manageable.     6/27/23  Reports several nights of 8-9hs a night. Feels more clear headed, better memory, and more energized. Discussed times of higher anxiety or racing thoughts " in public settings. Discussed ways of lowering anxiety in those events.      6/13/23  Reporting being more busy with jobs [place] and [place] has helped with her anxiety. Went to graduation party over the weekend with many people. This went well with conversation. Did have some conversations that were uncomfortable with individuals who expressed concern over how she was a year ago. Discussed role with friend who is suffering with MH. Processed advantages and disadvantages of involving self further in her health care decisions.     5/31/23  Reported she had a wonderful Memorial Day weekend with family at the [facility]. Reporting they have followed up with husbands pancreas. MDs have recommended removing it completely. They have to make many decisions with his health. Anxiety has been high as a result. They have time to make decisions but all options are not ideal. Discussed maintaining healthy skills for dealing with anxiety.       5/17/23  Went to see friend with high anxiety. She said conversation was good and she was able to help with relating to her. Discussed challenging self to drive further distances. This has been going really well for her. Discussed husbands doctors appt and decision to have surgery or not.      [facility]  MH update: Reports feeling more clear headed on trazodone and off Ambien. She is taking half the pill at night. Headaches have been less and less sever. No change in sleep. She has been more active in doing things she would have done over a year ago. Not stressing about the cabin remodel. She has been spending time with grandchildren. Notes family has really noticed difference in her mental health. Reports reading recommended books by [facility]. Going to see friend struggling with mental health.   [place]  Appetite: unremarkable  Sleep: unremarkable  Therapy: weekly therapy  FARIDA: na  Preg: na  Most important: Does it matter if she alters the dose of trazodone from day  "to day.     5/1/23  Reports they drove to the [employer] this weekend. Feels things are progressing well. Discussed switching medication for sleep to Trazodone. She has been sleeping the same on Ambien. She is taking a half pill and not having any grogginess. She has been engaged with friends and [employer]. Recommended Gifts of Imperfections and I Thought It Was Just Me by Darrick Arteaga. Also Decluttering Your Mind by CAT Melgar. Discussed having others notice positive changes in her. She struggles with having that spot light on her.       4/27/23 ([facility])  MH update: doing well with heightened anxiety but not panic attacks.   Stresses: [place]   Appetite: unremarkable  Sleep: unremarkable  Therapy: seeing therapist weekly, reports sister came for several days that was great. Making decision about traveling for family wedding.   FARIDA: na  Preg: na  Most important: continues to have headaches daily. Wants to discuss Ambien with MD/PCP suggested getting off completely. Would like to have Propanol for 10 mg to not have to cut.      4/19/23  Reports managing her anxiety around renovation of [employer] bathroom has been difficult. Discussed desire for control and letting go.Reports sister is coming for several days.  is having surgery tomorrow. She has been walking and exercising more.      4/12/23  Reports it she is doing well and had a great Easter with family. Discussed more about her anxiety (ABC), being self blaming (what ifs/should have) at nature but at other times unknown source. Discussed eliminating \"should' for vocabulary due to shame, self judgement, and regret associated with the word. She will continue to work on ABC and look at the use of should.     3/31/23  Reports she is doing well. Anxiety has been pretty good but has headaches when anxious. Looking forward to going to theater with grandchildren. Medications have been very helpful. Discussed ABC of Anxiety. Discussed events of anxiety at " "its worse. Discussed the beliefs she has about anxiety events. Discussed consequences of event.     [facility]  MH update: Reports anxiety has decreased a lot on the propanol. Continues to have headaches but only about half the days. She has noticed a stronger correlation with headaches and anxiety. She completed Neuro Psych testing. Relieved she has no new dx. Planning on walking with dog. She is feeling more like her self, \"5 star days.\"    Stresses: Neuro psych testing and going to wake alone was stressful. Felt she managed very well.   Appetite: unremarkable  Sleep: unremarkable  Therapy: Continues to do weekly therapy. [place] she worried a lot about testing and did not , lesson in not worrying about things that have not happened yet. Feeling very accomplished after attending a wake alone without her . She stated it went very well and anxiety was lower than normal and then expected. Discussed decision making in the moment. Discussed it can be difficult to make decisions in the moment. Continues with coping skills, ie meditation and breathing tch.   FARIDA: na  Preg: na  Most important: Wants to address doses of medications.     3/14/23  Feels the propanol has been really helping with anxiety. She has been feeling like her self for the past 5 days. Continues to have several headaches. She is considering going back to yoga after looking as some neck stretches.     [facility]  She has been more anxious on the different dosing of medications. She has also not noticed decrease in headaches. Discussed neck tension and using body scanning. She continues to feel a bit foggy in the afternoons.     3/3/23  Reports doing well. States she has been in communication with MD about headaches. Has noticed on days when she has lower dose of med she has more anxiety. She has been examining her anxiety when going out socially. Does not feel it is associated with the socializing itself but rather \"will the anxiety come " "up.\" Discussed upcoming appt for neuro psych testing. Is hesitant but willing to do testing. May want to talk to MD about it prior appt. Fearful about Drug Induced Parkinson's.       2/17/23  Pt reports she is doing well. Reports anxiety has been better. Able to control it better with accepting and reognition of anxiety. Continues to utilize meditation and review of skills taught in therapy. Ice packs has been helping with headaches. Self talk has much improved and has gained more confidence. Anxiety in the evening have improved but still feeling the anxiety at times. She has been wanting to work exercise into her daily routine. Sleep has been also improve. ID values: umm, family, self respect responsibility and kindness.       2/10/23  Pt reports she had such a good time with her trip to her sisters house. Much reduced anxious than expected (compared to home) and sleep was really good. Discussed implementing things that worked at [employer] for home. Continues to have some anxiety when going into store or before events. Reports panic attacks are less than previous week. Discussed triggers for anxiety. Discussed do expectations of self meet values.  ID values from next time and look at expectations for self. Pt asked \"What could I do better to be good enough\". Struggles with being good enough for self. Talked about list of things she wants to achieve in order to feel \"good enough\". One being able to go to places with out wondering if she will be anxious.     Identified Triggers for anxiety and background thought behind it:  More anxious when tired: less bandwidth to utilize skills  Feeling going to be late  Making quick decisions  Technology   organizing big meal for company: what people expect of her  MD visits or waiting for visit: anticipations of events at MD, has not had poor experiences, husbands medical experience   What if something happens to Zach   Packing groceries and people are behind her  Sometimes " going out to eat (much better): menu with too many options and having to make decisions  Holiday: anticipation and wanting it to be perfect for people/what ng people expect of her  Dealing with insurance problem: hassle and being out of her control   Being in the unknown about things: Slowness of recovery from anxiety, what ifs  Do people see I am anxious  Large crowd of people: leading up to and during (highest), people might be noticing her, dont want to stand   *Things that need to be done but are not done(floor needing to be washed, getting decorations down etc): likes list to check off (what to accomplish that week), Not accomplishing goal. Self placed expectations (high standards from growing up), perfectionism (mom perfectionism as a child), some self comparisons to others, taking on too much during a week, and expecting to be like old self all the time (compare self to old self).     2/1/23  Pt is reporting she is doing well. She has been out driving to do shopping and seeing friends. Continues to have improvement with anxiety each day. Continues to have headaches but is wanting to change medications until she gets back from trip to her sisters home. Having some anxiety about spending over night for the first time since COVID. Discussed steps to addressing anxiety at her sisters home and drive up. 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 Became dizzy and become worried about the physical sx she was experiencing. Anxiety grew from that point on. Discussed how she handled it in the moment with redirecting thoughts, accepting anxiety and attempting some of the anxiety tech discussed. Pt will explore other situations in which anxiety tends to arise for next session. Pt has been keeping a journal of her daily experience and will reexamine journal to better understand anxiety. Pt listed several situations she knows brings anxiety.      Identified Triggers:  Feeling rushed  Not  wanting to be late  Having others do responsibilities she once carried as her responsibility  Being flexible  Expectations of others  What do others see/think of her when experiencing panic attack    1/23/23  Reports she is doing really well. Reports she will be driving up to see her grandson. She is highly looking forward to this and making valentines cards. Some anxiety with going in to the grocery store but not once she gets started shopping. Reports her ultimate goal would be to go to the mall alone with traffic as that has been causing anxiety. Headaches continue to be of concern to her. Discussed further and identified them coming following either a panic attack or after high anxiety. Discussed using sensory tech for elevating the anxiety and also for following headache such as heating pad for neck or eyes. Discussed her conversation with Dr. Pompa about possibly changing meds to help with headaches. She reports relishing in current progress and is not wanting meds to change at this time. Headaches do interfere sometimes with daily life. She will be going to lunch with several college friends. This has caused a bit of anxiety in accepting the invite. Normalized experience for pt.       Progress on Treatment Objective(s) / Homework:  Satisfactory progress - ACTION (Actively working towards change); Intervened by reinforcing change plan / affirming steps taken    Motivational Interviewing    MI Intervention: Expressed Empathy/Understanding, Supported Autonomy, Collaboration, Evocation, Open-ended questions, Change talk (evoked) and Reframe     Change Talk Expressed by the Patient: Desire to change Ability to change Reasons to change Need to change    Provider Response to Change Talk: E - Evoked more info from patient about behavior change, A - Affirmed patient's thoughts, decisions, or attempts at behavior change, R - Reflected patient's change talk and S - Summarized patient's change talk statements      Care  Plan review completed: Yes    Medication Review:  No changes to current psychiatric medication(s)    Medication Compliance:  Yes    Changes in Health Issues:   None reported    Chemical Use Review:   Substance Use: Chemical use reviewed, no active concerns identified      Tobacco Use: No current tobacco use.      Assessment: Current Emotional / Mental Status (status of significant symptoms):  Risk status (Self / Other harm or suicidal ideation)  Patient denies a history of suicidal ideation, suicide attempts, self-injurious behavior, homicidal ideation, homicidal behavior and and other safety concerns  Patient denies current fears or concerns for personal safety.  Patient denies current or recent suicidal ideation or behaviors.  Patient denies current or recent homicidal ideation or behaviors.  Patient denies current or recent self injurious behavior or ideation.  Patient denies other safety concerns.  A safety and risk management plan has not been developed at this time, however patient was encouraged to call [place] [phone] should there be a change in any of these risk factors.    Appearance:   Appropriate   Eye Contact:   Good   Psychomotor Behavior: Normal   Attitude:   Cooperative   Orientation:   All  Speech   Rate / Production: Normal    Volume:  Normal   Mood:    Anxious   Affect:    Appropriate   Thought Content:  Clear   Thought Form:  Coherent  Logical   Insight:    Good     Diagnoses:  1. Generalized anxiety disorder    2. Insomnia, unspecified type    3. Mood disorder (H)        Collateral Reports Completed:  Not Applicable    Plan: (Homework, other):  Patient was given information about behavioral services and encouraged to schedule a follow up appointment with the clinic [facility] in 1 week.  She was also given information about mental health symptoms and treatment options .  CD Recommendations: No indications of CD issues.  Erika STARK  LICSW      ______________________________________________________________________    Integrated Primary Care Behavioral Health Treatment Plan    Patient's Name: Jolynn Haji  YOB: 1955    Date of Creation: 10/5/22  Date Treatment Plan Last Reviewed/Revised: 4/27/23    DSM5 Diagnoses: 300.02 (F41.1) Generalized Anxiety Disorder  Psychosocial / Contextual Factors: Individual Factors high anxiety associated with depressive sx, interfereing with ability to function as she would like.  .  PROMIS (reviewed every 90 days):     Referral / Collaboration:  Was/were discussed and patient will pursue. - individual therapy    Anticipated number of session for this episode of care: 5-8 sessions  Anticipation frequency of session: Weekly  Anticipated Duration of each session: 38-52 minutes  Treatment plan will be reviewed in 90 days or when goals have been changed.       MeasurableTreatment Goal(s) related to diagnosis / functional impairment(s)  Goal 1: Patient will work with provider to manage symptoms    I will know I've met my goal when when less anxious and feeling down.      Objective #A (Patient Action)    Patient will  attend all sessions.  Status: Continued - Date(s): 4/27/23    Intervention(s)  Therapist will teach educate patient on treatment options, clarify concerns, work with pt to overcome any resistance to compliance..    Objective #B  Patient will identify 3 fears / thoughts that contribute to feeling anxious.  Status: Continued - Date(s): 4/27/23     Intervention(s)  Therapist will educate patient on treatment options, clarify concerns, work with pt to overcome any resistance to compliance..    Objective #C  Patient will identify 3 initial signs or symptoms of anxiety.  Status: Continued - Date(s): 4/27/23     Intervention(s)  Therapist will educate patient on treatment options, clarify concerns, work with pt to overcome any resistance to compliance..        Patient has reviewed and agreed to the  above plan.      Erika Francisco [facility]  July 10, 2023  Answers for HPI/ROS submitted by the patient on 12/6/2022  TIFFANY 7 TOTAL SCORE: 3  Answers for HPI/ROS submitted by the patient on 3/8/2023  If you checked off any problems, how difficult have these problems made it for you to do your work, take care of things at home, or get along with other people?: Not difficult at all  PHQ9 TOTAL SCORE: 5  Answers for HPI/ROS submitted by the patient on 4/26/2023  If you checked off any problems, how difficult have these problems made it for you to do your work, take care of things at home, or get along with other people?: Somewhat difficult  PHQ9 TOTAL SCORE: 1  Answers for HPI/ROS submitted by the patient on 6/12/2023  If you checked off any problems, how difficult have these problems made it for you to do your work, take care of things at home, or get along with other people?: Not difficult at all  PHQ9 TOTAL SCORE: 1

## 2023-07-10 ENCOUNTER — VIRTUAL VISIT (OUTPATIENT)
Dept: BEHAVIORAL HEALTH | Facility: CLINIC | Age: 68
End: 2023-07-10
Payer: COMMERCIAL

## 2023-07-10 DIAGNOSIS — F39 MOOD DISORDER (H): ICD-10-CM

## 2023-07-10 DIAGNOSIS — G47.00 INSOMNIA, UNSPECIFIED TYPE: ICD-10-CM

## 2023-07-10 DIAGNOSIS — F41.1 GENERALIZED ANXIETY DISORDER: Primary | ICD-10-CM

## 2023-07-10 PROCEDURE — 90837 PSYTX W PT 60 MINUTES: CPT | Mod: VID | Performed by: SOCIAL WORKER

## 2023-07-10 ASSESSMENT — ANXIETY QUESTIONNAIRES
5. BEING SO RESTLESS THAT IT IS HARD TO SIT STILL: NOT AT ALL
6. BECOMING EASILY ANNOYED OR IRRITABLE: NOT AT ALL
2. NOT BEING ABLE TO STOP OR CONTROL WORRYING: NOT AT ALL
3. WORRYING TOO MUCH ABOUT DIFFERENT THINGS: SEVERAL DAYS
4. TROUBLE RELAXING: NOT AT ALL
GAD7 TOTAL SCORE: 2
7. FEELING AFRAID AS IF SOMETHING AWFUL MIGHT HAPPEN: NOT AT ALL
1. FEELING NERVOUS, ANXIOUS, OR ON EDGE: SEVERAL DAYS
IF YOU CHECKED OFF ANY PROBLEMS ON THIS QUESTIONNAIRE, HOW DIFFICULT HAVE THESE PROBLEMS MADE IT FOR YOU TO DO YOUR WORK, TAKE CARE OF THINGS AT HOME, OR GET ALONG WITH OTHER PEOPLE: NOT DIFFICULT AT ALL

## 2023-07-11 ENCOUNTER — VIRTUAL VISIT (OUTPATIENT)
Dept: BEHAVIORAL HEALTH | Facility: CLINIC | Age: 68
End: 2023-07-11
Payer: COMMERCIAL

## 2023-07-11 ENCOUNTER — VIRTUAL VISIT (OUTPATIENT)
Dept: PSYCHIATRY | Facility: CLINIC | Age: 68
End: 2023-07-11
Payer: COMMERCIAL

## 2023-07-11 DIAGNOSIS — F39 MOOD DISORDER (H): ICD-10-CM

## 2023-07-11 DIAGNOSIS — G47.00 INSOMNIA, UNSPECIFIED TYPE: ICD-10-CM

## 2023-07-11 DIAGNOSIS — Z87.820 HISTORY OF TRAUMATIC BRAIN INJURY: ICD-10-CM

## 2023-07-11 DIAGNOSIS — F41.1 GAD (GENERALIZED ANXIETY DISORDER): Primary | ICD-10-CM

## 2023-07-11 DIAGNOSIS — F41.1 GENERALIZED ANXIETY DISORDER: Primary | ICD-10-CM

## 2023-07-11 PROCEDURE — 90832 PSYTX W PT 30 MINUTES: CPT | Mod: VID | Performed by: PSYCHOLOGIST

## 2023-07-11 PROCEDURE — 99214 OFFICE O/P EST MOD 30 MIN: CPT | Mod: VID | Performed by: PSYCHIATRY & NEUROLOGY

## 2023-07-11 RX ORDER — VENLAFAXINE HYDROCHLORIDE 37.5 MG/1
CAPSULE, EXTENDED RELEASE ORAL
Qty: 90 CAPSULE | Refills: 1 | Status: SHIPPED | OUTPATIENT
Start: 2023-07-11 | End: 2024-01-09 | Stop reason: ALTCHOICE

## 2023-07-11 RX ORDER — VENLAFAXINE HYDROCHLORIDE 75 MG/1
CAPSULE, EXTENDED RELEASE ORAL
Qty: 90 CAPSULE | Refills: 1 | Status: SHIPPED | OUTPATIENT
Start: 2023-07-11 | End: 2023-12-22

## 2023-07-11 ASSESSMENT — ANXIETY QUESTIONNAIRES
6. BECOMING EASILY ANNOYED OR IRRITABLE: NOT AT ALL
5. BEING SO RESTLESS THAT IT IS HARD TO SIT STILL: NOT AT ALL
1. FEELING NERVOUS, ANXIOUS, OR ON EDGE: SEVERAL DAYS
3. WORRYING TOO MUCH ABOUT DIFFERENT THINGS: NOT AT ALL
4. TROUBLE RELAXING: NOT AT ALL
GAD7 TOTAL SCORE: 1
IF YOU CHECKED OFF ANY PROBLEMS ON THIS QUESTIONNAIRE, HOW DIFFICULT HAVE THESE PROBLEMS MADE IT FOR YOU TO DO YOUR WORK, TAKE CARE OF THINGS AT HOME, OR GET ALONG WITH OTHER PEOPLE: NOT DIFFICULT AT ALL
7. FEELING AFRAID AS IF SOMETHING AWFUL MIGHT HAPPEN: NOT AT ALL
GAD7 TOTAL SCORE: 1
2. NOT BEING ABLE TO STOP OR CONTROL WORRYING: NOT AT ALL

## 2023-07-11 ASSESSMENT — PAIN SCALES - GENERAL: PAINLEVEL: NO PAIN (0)

## 2023-07-11 NOTE — NURSING NOTE
Is the patient currently in the state of [place]? YES    Visit mode:VIDEO    If the visit is dropped, the patient can be reconnected by: VIDEO VISIT: Text to cell phone:   Telephone Information:   Mobile [phone]       Will anyone else be joining the visit? No  (If patient encounters technical issues they should call [phone])    How would you like to obtain your AVS? MyChart    Are changes needed to the allergy or medication list? NO    Rooming Documentation: Assigned questionnaire(s) completed . and Attendance Guidelines - Care team has reviewed attendance agreement with patient. Patient advised that two failed appointments within 6 months may lead to termination of current episode of care.      Reason for visit: BRET Chicas

## 2023-07-11 NOTE — PROGRESS NOTES
[facility] Collaborative Care Psychiatry Services [facility]  July 11, 2023      Behavioral Health Clinician Progress Note    Patient Name: Jolynn Haji           Service Type:  Individual      Service Location:   MyChart / Email (patient reached)     Session Start Time: 11:10 am  Session End Time: 11:30 am      Session Length: 16 - 37      Attendees: Patient     Service Modality:  Video Visit:      Provider verified identity through the following two step process.  Patient provided:  Patient is known previously to provider    Telemedicine Visit: The patient's condition can be safely assessed and treated via synchronous audio and visual telemedicine encounter.      Reason for Telemedicine Visit: Services only offered telehealth    Originating Site (Patient Location): Patient's home    Distant Site (Provider Location): [facility] MENTAL HEALTH & ADDI[facility]    Consent:  The patient/guardian has verbally consented to: the potential risks and benefits of telemedicine (video visit) versus in person care; bill my insurance or make self-payment for services provided; and responsibility for payment of non-covered services.     Patient would like the video invitation sent by:  My Chart    Mode of Communication:  Video Conference via [facility]    Distant Location (Provider):  On-site    As the provider I attest to compliance with applicable laws and regulations related to telemedicine.    Visit Activities (Refresh list every visit): [facility] Only    Diagnostic Assessment Date: 9/27/22  Treatment Plan Review Date: 7/27/23  See Flowsheets for today's PHQ-9 and TIFFANY-7 results  Previous PHQ-9:       4/26/2023    11:16 AM 5/3/2023     1:18 PM 6/12/2023     8:47 PM   PHQ-9 SCORE   PHQ-9 Total Score MyChart 1 (Minimal depression) 1 (Minimal depression) 1 (Minimal depression)   PHQ-9 Total Score 1 1 1     Previous TIFFANY-7:       3/23/2023     5:33 PM 5/3/2023     1:18 PM 7/10/2023    10:54 AM   TIFFANY-7 SCORE   Total Score 5  (mild anxiety) 3 (minimal anxiety) 2 (minimal anxiety)   Total Score 5 3 2       KARINA LEVEL:      8/27/2010     3:00 PM 2/22/2011    11:00 AM   KARINA Score (Last Two)   KARINA Raw Score 51 49   Activation Score 91.6 82.8   KARINA Level 4 4       DATA  Extended Session (60+ minutes): No  Interactive Complexity: No  Crisis: No  [facility] Patient: No    Treatment Objective(s) Addressed in This Session:  Target Behavior(s): anxiety and depression    Depressed Mood: Increase interest, engagement, and pleasure in doing things  Decrease frequency and intensity of feeling down, depressed, hopeless  Improve quantity and quality of night time sleep / decrease daytime naps  Feel less tired and more energy during the day   Improve diet, appetite, mindful eating, and / or meal planning  Identify negative self-talk and behaviors: challenge core beliefs, myths, and actions  Improve concentration, focus, and mindfulness in daily activities   Feel less fidgety, restless or slow in daily activities / interpersonal interactions  Anxiety: will experience a reduction in anxiety, will develop more effective coping skills to manage anxiety symptoms, will develop healthy cognitive patterns and beliefs and will increase ability to function adaptively    Current Stressors / Issues:  Reported that she is doing well. She is active remodeling her home, spending time with her family, volunteering at her [employer], and is very pleased with her progress. She reported that with trazodone, she is getting more sleep (7.5 hours). She tends to get a headache several days a week in the afternoons. She also feels fatigued several days a week but that tends to be when she over-exerts herself physically.       4/27/2023 (Erika Francisco, [facility]):  MH update: doing well with heightened anxiety but not panic attacks.   Stresses: [place] renovations   Appetite: unremarkable  Sleep: unremarkable  Therapy: seeing therapist weekly, reports sister came for several days that was  "great. Making decision about traveling for family wedding.   FARIDA: na  Preg: na  Most important: continues to have headaches daily. Wants to discuss Ambien with MD/PCP suggested getting off completely. Would like to have Propanol for 10 mg to not have to cut.        3/23/23 [facility]  MH update: Reports anxiety has decreased a lot on the propanol. Continues to have headaches but only about half the days. She has noticed a stronger correlation with headaches and anxiety. She completed Neuro Psych testing. Relieved she has no new dx. Planning on walking with dog. She is feeling more like her self, \"5 star days.\"    Stresses: Neuro psych testing and going to wake alone was stressful. Felt she managed very well.   Appetite: unremarkable  Sleep: unremarkable  Therapy: Continues to do weekly therapy. [place] she worried a lot about testing and did not , lesson in not worrying about things that have not happened yet. Feeling very accomplished after attending a wake alone without her . She stated it went very well and anxiety was lower than normal and then expected. Discussed decision making in the moment. Discussed it can be difficult to make decisions in the moment. Continues with coping skills, ie meditation and breathing tch.   FARIDA: na  Preg: na  Most important: Wants to address doses of medications.       Progress on Treatment Objective(s) / Homework:  Satisfactory progress - ACTION (Actively working towards change); Intervened by reinforcing change plan / affirming steps taken    Motivational Interviewing    MI Intervention: Expressed Empathy/Understanding, Supported Autonomy, Collaboration, Evocation, Open-ended questions, Change talk (evoked) and Reframe     Change Talk Expressed by the Patient: Desire to change Ability to change Reasons to change Need to change    Provider Response to Change Talk: E - Evoked more info from patient about behavior change, A - Affirmed patient's thoughts, decisions, or attempts at " behavior change, R - Reflected patient's change talk and S - Summarized patient's change talk statements      Care Plan review completed: Yes    Medication Review:  No changes to current psychiatric medication(s)    Medication Compliance:  Yes    Changes in Health Issues:   None reported    Chemical Use Review:   Substance Use: Chemical use reviewed, no active concerns identified      Tobacco Use: No current tobacco use.      Assessment: Current Emotional / Mental Status (status of significant symptoms):  Risk status (Self / Other harm or suicidal ideation)  Patient denies a history of suicidal ideation, suicide attempts, self-injurious behavior, homicidal ideation, homicidal behavior and and other safety concerns  Patient denies current fears or concerns for personal safety.  Patient denies current or recent suicidal ideation or behaviors.  Patient denies current or recent homicidal ideation or behaviors.  Patient denies current or recent self injurious behavior or ideation.  Patient denies other safety concerns.  A safety and risk management plan has not been developed at this time, however patient was encouraged to call [place] [phone] should there be a change in any of these risk factors.    Appearance:   Appropriate   Eye Contact:   Good   Psychomotor Behavior: Normal   Attitude:   Cooperative   Orientation:   All  Speech   Rate / Production: Normal    Volume:  Normal   Mood:    Anxious   Affect:    Appropriate   Thought Content:  Clear   Thought Form:  Coherent  Logical   Insight:    Good     Diagnoses:  1. Generalized anxiety disorder    2. Mood disorder (H)    3. Insomnia, unspecified type        Collateral Reports Completed:  Communicated with: Dr. Pompa    Plan: (Homework, other):  Patient was given information about behavioral services and encouraged to schedule a follow up appointment with the clinic [facility] in 1 week.  She was also given information about mental health symptoms and treatment options .   CD Recommendations: No indications of CD issues.  Eran Landon PsyD, LP      ______________________________________________________________________    [facility] Psychiatry Services - [place]: Treatment Plan    Patient's Name: Jolynn Haji  YOB: 1955    Date of Creation: October 5, 2022  Date Treatment Plan Last Reviewed/Revised: April 27, 2023    DSM5 Diagnoses: 300.02 (F41.1) Generalized Anxiety Disorder  Psychosocial / Contextual Factors: Individual Factors high anxiety associated with depressive sx, interfereing with ability to function as she would like.  .  PROMIS (reviewed every 90 days):   PROMIS 10-Global Health (only subscores and total score):       5/12/2022     2:26 PM 7/1/2022    11:00 AM 9/27/2022     1:05 PM 12/26/2022     1:53 PM 1/17/2023     8:04 PM 4/18/2023    11:16 AM 4/26/2023    11:19 AM   PROMIS-10 Scores Only   Global Mental Health Score 9    9 17 10    10 14 17    17 15 15    15   Global Physical Health Score 17    17 13 17    17 17 18    18 17 15    15   PROMIS TOTAL - SUBSCORES 26    26 30 27    27 31 35    35 32 30    30       Referral / Collaboration:  Was/were discussed and patient will pursue. - individual therapy    Anticipated number of session for this episode of care: 5-8 sessions  Anticipation frequency of session: Weekly  Anticipated Duration of each session: 38-52 minutes  Treatment plan will be reviewed in 90 days or when goals have been changed.       MeasurableTreatment Goal(s) related to diagnosis / functional impairment(s)  Goal 1: Patient will work with provider to manage symptoms    I will know I've met my goal when when less anxious and feeling down.      Objective #A (Patient Action)    Patient will  attend all sessions.  Status: Continued - Date(s): 4/27/23    Intervention(s)  Therapist will teach educate patient on treatment options, clarify concerns, work with pt to overcome any resistance to compliance..    Objective  #B  Patient will identify 3 fears / thoughts that contribute to feeling anxious.  Status: Continued - Date(s): 4/27/23     Intervention(s)  Therapist will educate patient on treatment options, clarify concerns, work with pt to overcome any resistance to compliance..    Objective #C  Patient will identify 3 initial signs or symptoms of anxiety.  Status: Continued - Date(s): 4/27/23     Intervention(s)  Therapist will educate patient on treatment options, clarify concerns, work with pt to overcome any resistance to compliance..        Patient has reviewed and agreed to the above plan.      Anthoyn Landon PsyD  07/11/2023

## 2023-07-11 NOTE — PATIENT INSTRUCTIONS
Treatment Plan:   Continue trazodone 25-50 mg at bedtime as needed for sleep  Decrease Effexor-XR/venlafaxine ER:   Week 1-2:  Take 150 mg two days, 112.5 mg one day, 150 mg two days, 112.5 mg one day, etc    Week 3-4:  Take 112.5 mg one day, 150 mg one day, 112.5 mg one day, etc    Week 5:  Take 112.5 mg daily moving forward    Continue propranolol 20 mg twice daily for anxiety, tremor, headaches.  Continue to follow with neurology as they recommend.    Continue all other cares per primary care provider.   Continue all other medications as reviewed per electronic medical record today.   Safety plan reviewed. To the Emergency Department as needed or call after hours crisis line at [phone] or [phone]. [place] Crisis Text Line. Text [place] to 683466 or Suicide LifeLine Chat: suicidepreventionMobiliBuyline.org/chat  Continue individual psychotherapy for additional support and ongoing development of nonpharmacologic coping skills and strategies.  Schedule an appointment with me in 2 months or sooner as needed. Call [facility] at [phone] to schedule.  Follow up with primary care provider as planned or for acute medical concerns.  Call the psychiatric nurse line with medication questions or concerns at [phone].  MyChart may be used to communicate with your provider, but this is not intended to be used for emergencies.  Patient Education   Collaborative Care Psychiatry Service  What to Expect  Here's what to expect from your Collaborative Care Psychiatry Service (CCPS).   About CCPS  CCPS means 2 people work together to help you get better. You'll meet with a behavioral health clinician and a psychiatric doctor. A behavioral health clinician helps people with mental health problems by talking with them. A psychiatric doctor helps people by giving them medicine.  How it works  At every visit, you'll see the behavioral health clinician (BHC) first. They'll talk with you about how you're doing  "and teach you how to feel better.   Then you'll see the psychiatric doctor. This doctor can help you deal with troubling thoughts and feelings by giving you medicine. They'll make sure you know the plan for your care.   CCPS usually takes 3 to 6 visits. If you need more visits, we may have you start seeing a different psychiatric doctor for ongoing care.  If you have any questions or concerns, we'll be glad to talk with you.  About visits  Be open  At your visits, please talk openly about your problems. It may feel hard, but it's the best way for us to help you.  Cancelling visits  If you can't come to your visit, please call us right away at [phone]. If you don't cancel at least 24 hours (1 full day) before your visit, that's \"late cancellation.\"  Being late to visits  Being very late is the same as not showing up. You will be a \"no show\" if:  Your appointment starts with a [facility], and you're more than 15 minutes late for a 30-minute (half hour) visit. This will also cancel your appointment with the psychiatric doctor.  Your appointment is with a psychiatric doctor only, and you're more than 15 minutes late for a 30-minute (half hour) visit.  Your appointment is with a psychiatric doctor only, and you're more than 30 minutes late for a 60-minute (full hour) visit.  If you cancel late or don't show up 2 times within 6 months, we may end your care.   Getting help between visits  If you need help between visits, you can call us Monday to Friday from 8 a.m. to 4:30 p.m. at [phone].  Emergency care  Call 911 or go to the nearest emergency department if your life or someone else's life is in danger.  Call 378 anytime to reach the national Suicide and Crisis hotline.  Medicine refills  To refill your medicine, call your pharmacy. You can also call [facility]'s Behavioral Access at [phone], Monday to Friday, 8 a.m. to 4:30 p.m. It can take 1 to 3 business days to get a refill.   Forms, letters, and " tests  You may have papers to fill out, like FMLA, short-term disability, and workability. We can help you with these forms at your visits, but you must have an appointment. You may need more than 1 visit for this, to be in an intensive therapy program, or both.  Before we can give you medicine for ADHD, we may refer you to get tested for it or confirm it another way.  We may not be able to give you an emotional support animal letter.  We don't do mental health checks ordered by the court.   We don't do mental health testing, but we can refer you to get tested.   Thank you for choosing us for your care.  For informational purposes only. Not to replace the advice of your health care provider. Copyright   2022 [facility]. All rights reserved. [employer] 966686 - 12/22.

## 2023-07-11 NOTE — PROGRESS NOTES
"Telemedicine Visit: The patient's condition can be safely assessed and treated via synchronous audio and visual telemedicine encounter.      Reason for Telemedicine Visit: Patient has requested telehealth visit    Originating Site (Patient Location): Patient's home    Distant Location (provider location): On-Site    Consent:  The patient/guardian has verbally consented to: the potential risks and benefits of telemedicine (video visit) versus in person care; bill my insurance or make self-payment for services provided; and responsibility for payment of non-covered services.     Mode of Communication:  Video Conference via [email or web address]    As the provider I attest to compliance with applicable laws and regulations related to telemedicine.        Outpatient Psychiatric Progress Note    Name: Jolynn Haji   : 1955                    Primary Care Provider: Helga Ibarra MD   Therapist: Working with DINESH Whipple    PHQ-9 scores:      2023    11:16 AM 5/3/2023     1:18 PM 2023     8:47 PM   PHQ-9 SCORE   PHQ-9 Total Score MyChart 1 (Minimal depression) 1 (Minimal depression) 1 (Minimal depression)   PHQ-9 Total Score 1 1 1       TIFFANY-7 scores:      3/23/2023     5:33 PM 5/3/2023     1:18 PM 7/10/2023    10:54 AM   TIFFANY-7 SCORE   Total Score 5 (mild anxiety) 3 (minimal anxiety) 2 (minimal anxiety)   Total Score 5 3 2       Patient Identification:  Patient is a 67 year old,   White Choose not to answer female  who presents for return visit with me.  Patient is currently retired. Patient attended the phone/video session alone today. Patient prefers to be called: \" Azalea\".    Interim History:  I last saw Jolynn Haji for outpatient psychiatry return visit on 2023. During that appointment, we:    Continue trazodone 25-50 mg at bedtime as needed for sleep    Continue Effexor-XR/venlafaxine  mg daily for depression and anxiety.     Continue propranolol 20 mg twice daily for anxiety, " "tremor, headaches.    Continue to follow with neurology as they recommend.      Continue all other cares per primary care provider.     7/11: Patient overall doing well.  Mood stable.  Anxiety stable and manageable.  Still having headaches and fatigue most days of the week.  Sleeping well with as needed trazodone.  Does not feel foggy during the day like she did with zolpidem.  No acute safety concerns.  No SI.  No problematic drug or alcohol use.  Continues in therapy.    Per [facility], Dr. Eran Landon, during today's team-based visit:  Reported that she is doing well. She is active remodeling her home, spending time with her family, volunteering at her [employer], and is very pleased with her progress. She reported that with trazodone, she is getting more sleep (7.5 hours). She tends to get a headache several days a week in the afternoons. She also feels fatigued several days a week but that tends to be when she over-exerts herself physically.     Past Psychiatric Med Trials:  Psych Meds at Intake:  lexapro 20 mg daily  abilify 5 mg daily      Past Psych Meds:  Amitriptyline  Quetiapine  Mirtazapine - stopped \"because I was sleeping through the night\"    Psychiatric ROS:  Jolynn Haji reports mood has been: Stable  Anxiety has been: Improved/stable, see HPI above  Sleep has been: Stable, trazodone working well  Seema sxs: None  Psychosis sxs: N/A  ADHD/ADD sxs: N/A  PTSD sxs: N/A  PHQ9 and GAD7 scores were reviewed today if completed.   Medication side effects: Denies propranolol ASEs;  headaches and fatigue from venlafaxine  Current stressors include: Symptoms and See HPI above  Coping mechanisms and supports include: Family, Hobbies and Friends    Current medications include:   Current Outpatient Medications   Medication Sig     clindamycin (CLINDAGEL) 1 % external gel Apply topically daily     lactobacillus rhamnosus, GG, (CULTURELL) capsule Take 1 capsule by mouth daily Taking every other day     lisinopril (ZESTRIL) 5 MG " tablet Takes 1/2 tablet to equal 2.5 mg     propranolol (INDERAL) 20 MG tablet Take 1 tablet (20 mg) by mouth 2 times daily     traZODone (DESYREL) 50 MG tablet Take 0.5-1 tablets (25-50 mg) by mouth nightly as needed for sleep     venlafaxine (EFFEXOR XR) 150 MG 24 hr capsule Take 1 capsule (150 mg) by mouth daily     Current Facility-Administered Medications   Medication     lidocaine 1 % 4 mL     lidocaine 1 % injection 3 mL     lidocaine 1 % injection 4 mL     triamcinolone (KENALOG-40) injection 40 mg       Past Medical/Surgical History:  Past Medical History:   Diagnosis Date     Abnormal glandular Papanicolaou smear of cervix- ASCUS in 2000 12/5/2003    ASC H- 2000     Basal cell carcinoma of leg, right 05/2016    Dr Rogers     Colon polyps 05/2018    tubular adenoam - due 5 yrs - 2 mm     Concussion without loss of consciousness 4/12/2017     Hidradenitis     left  groin     HSIL (high grade squamous intraepithelial lesion) on Pap smear of cervix 7/5/2000    Normal paps from 2001 to 2016/cc     Hypertension goal BP (blood pressure) < 140/90      OA (osteoarthritis) of knee     moderate     Postmenopausal since 7/2008      Shingles 7/23/2010    left      Squamous cell carcinoma of skin, unspecified      Synovial cyst of popliteal space       has a past medical history of Abnormal glandular Papanicolaou smear of cervix- ASCUS in 2000 (12/5/2003), Basal cell carcinoma of leg, right (05/2016), Colon polyps (05/2018), Concussion without loss of consciousness (4/12/2017), Hidradenitis, HSIL (high grade squamous intraepithelial lesion) on Pap smear of cervix (7/5/2000), Hypertension goal BP (blood pressure) < 140/90, OA (osteoarthritis) of knee, Postmenopausal (since 7/2008 ), Shingles (7/23/2010), Squamous cell carcinoma of skin, unspecified, and Synovial cyst of popliteal space.    She has no past medical history of Malignant melanoma (H) or Squamous cell carcinoma.    Social History:  Reviewed. No changes to  social history except as noted above in HPI.    Vital Signs:   None. This is phone/video visit.     Labs:  Most recent laboratory results reviewed and the pertinent results include:   TSH   Date Value Ref Range Status   02/08/2023 1.65 0.30 - 4.20 uIU/mL Final   04/25/2022 1.30 0.40 - 4.00 mU/L Final   01/13/2021 2.07 0.40 - 4.00 mU/L Final     Lab Results   Component Value Date    WBC 8.1 04/25/2022    WBC 6.4 01/13/2021     Lab Results   Component Value Date    RBC 4.58 04/25/2022    RBC 4.54 01/13/2021     Lab Results   Component Value Date    HGB 13.6 04/25/2022    HGB 13.3 01/13/2021     Lab Results   Component Value Date    HCT 41.1 04/25/2022    HCT 41.7 01/13/2021     No components found for: MCT  Lab Results   Component Value Date    MCV 90 04/25/2022    MCV 92 01/13/2021     Lab Results   Component Value Date    MCH 29.7 04/25/2022    MCH 29.3 01/13/2021     Lab Results   Component Value Date    MCHC 33.1 04/25/2022    MCHC 31.9 01/13/2021     Lab Results   Component Value Date    RDW 12.9 04/25/2022    RDW 13.1 01/13/2021     Lab Results   Component Value Date     04/25/2022     01/13/2021     Last Comprehensive Metabolic Panel:  Sodium   Date Value Ref Range Status   02/08/2023 141 136 - 145 mmol/L Final   01/13/2021 140 133 - 144 mmol/L Final     Potassium   Date Value Ref Range Status   02/08/2023 4.1 3.4 - 5.3 mmol/L Final   04/25/2022 4.4 3.4 - 5.3 mmol/L Final   01/13/2021 4.1 3.4 - 5.3 mmol/L Final     Chloride   Date Value Ref Range Status   02/08/2023 103 98 - 107 mmol/L Final   04/25/2022 105 94 - 109 mmol/L Final   01/13/2021 108 94 - 109 mmol/L Final     Carbon Dioxide   Date Value Ref Range Status   01/13/2021 28 20 - 32 mmol/L Final     Carbon Dioxide (CO2)   Date Value Ref Range Status   02/08/2023 25 22 - 29 mmol/L Final   04/25/2022 26 20 - 32 mmol/L Final     Anion Gap   Date Value Ref Range Status   02/08/2023 13 7 - 15 mmol/L Final   04/25/2022 6 3 - 14 mmol/L Final    01/13/2021 4 3 - 14 mmol/L Final     Glucose   Date Value Ref Range Status   02/08/2023 95 70 - 99 mg/dL Final   04/25/2022 104 (H) 70 - 99 mg/dL Final   01/13/2021 91 70 - 99 mg/dL Final     Comment:     Fasting specimen     Urea Nitrogen   Date Value Ref Range Status   02/08/2023 16.2 8.0 - 23.0 mg/dL Final   04/25/2022 23 7 - 30 mg/dL Final   01/13/2021 18 7 - 30 mg/dL Final     Creatinine   Date Value Ref Range Status   02/08/2023 0.62 0.51 - 0.95 mg/dL Final   01/13/2021 0.68 0.52 - 1.04 mg/dL Final     GFR Estimate   Date Value Ref Range Status   02/08/2023 >90 >60 mL/min/1.73m2 Final     Comment:     eGFR calculated using 2021 CKD-EPI equation.   01/13/2021 >90 >60 mL/min/[1.73_m2] Final     Comment:     Non  GFR Calc  Starting 12/18/2018, serum creatinine based estimated GFR (eGFR) will be   calculated using the Chronic Kidney Disease Epidemiology Collaboration   (CKD-EPI) equation.       Calcium   Date Value Ref Range Status   02/08/2023 9.7 8.8 - 10.2 mg/dL Final   01/13/2021 8.8 8.5 - 10.1 mg/dL Final     Bilirubin Total   Date Value Ref Range Status   02/08/2023 0.5 <=1.2 mg/dL Final   01/13/2021 0.8 0.2 - 1.3 mg/dL Final     Alkaline Phosphatase   Date Value Ref Range Status   02/08/2023 73 35 - 104 U/L Final   01/13/2021 72 40 - 150 U/L Final     ALT   Date Value Ref Range Status   02/08/2023 19 10 - 35 U/L Final   01/13/2021 17 0 - 50 U/L Final     AST   Date Value Ref Range Status   02/08/2023 22 10 - 35 U/L Final   01/13/2021 13 0 - 45 U/L Final     Review of Systems:  10 systems (general, cardiovascular, respiratory, eyes, ENT, endocrine, GI, , M/S, neurological) were reviewed. Most pertinent finding(s) is/are: Some chronic pains, see HPI above. The remaining systems are all unremarkable.    Mental Status Examination (limited as this is by phone/video):  Appearance: Awake, alert, appears stated age, no acute distress, well-groomed   Attitude:  cooperative, pleasant   Motor: No  gross abnormalities noted today.  Not formally tested  Oriented to:  person, place, time, and situation  Attention Span and Concentration:  normal  Speech: Normal volume, normal rate, normal rhythm, coherent, no longer very delayed, no longer very hesitant  Language: intact  Mood: good  Affect: Overall appropriate and mood congruent  Associations:  no loose associations  Thought Process:  logical, linear and goal oriented  Thought Content:  no evidence of suicidal ideation or homicidal ideation, no evidence of psychotic thought, no auditory hallucinations present and no visual hallucinations present  Recent and Remote Memory: Intact to interview.  Not formally assessed.   Fund of Knowledge: appropriate  Insight: Good  Judgment:  intact, adequate for safety  Impulse Control:  intact    Suicide Risk Assessment:  Today Jolynn Haji reports no suicidal ideation. Based on all available evidence including the factors cited above, Jolynn Haji does not appear to be at imminent risk for self-harm, does not meet criteria for a 72-hr hold, and therefore remains appropriate for ongoing outpatient level of care.  A thorough assessment of risk factors related to suicide and self-harm have been reviewed and are noted above. The patient convincingly denies suicidality on several occasions. Local community safety resources reviewed for patient to use if needed. There was no deceit detected, and the patient presented in a manner that was believable.     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  R/O Underlying Parkinson's Disease (pt working with neurology)    Medical comorbidities include:   Patient Active Problem List    Diagnosis Date Noted     Essential hypertension with goal blood pressure less than 140/90 07/16/2001     Priority: High     Drug-induced parkinsonism (H)- Abilify - generic = ARIPiprazole( 5mg)  01/02/2023      Priority: Medium     Mood disorder (H) 01/02/2023     Priority: Medium     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 - much improved - resolved as of 5/5/2023 07/07/2022     Priority: Medium     Adjustment insomnia - resolved as of 5/5/2023 - off of ambien with psychiatry's okay  12/23/2021     Priority: Medium     Screening for cervical cancer      Priority: Medium     1999 NIL  2000 ASCUS-H.  >> Colpo: Neg  2001 NIL   2003 NIL pap, Neg HPV  [id number], 2016  NIL pap  7/25/19 NIL pap, Neg HPV    Criteria necessary to stop screening per ASCCP guidelines:  Older than 65 years  Three consecutive negative cytology results or two consecutive negative cotest results within the previous 10 years, with the most recent being performed within the last 5 years.   (Women with hx of MADDIE 2 or 3, or adenocarcinoma in situ should continue screening for full 20 years, even if this goes past age 65).         Hyperlipidemia LDL goal <130 11/30/2018     Priority: Medium     Basal cell carcinoma      Priority: Medium     Colon polyps 05/01/2018     Priority: Medium     Primary localized osteoarthrosis of left lower leg 02/19/2018     Priority: Medium     Other secondary osteoarthritis of left knee 01/29/2018     Priority: Medium     Chronic constipation 09/14/2017     Priority: Medium     Raynaud's phenomenon without gangrene 09/14/2017     Priority: Medium     Insomnia, unspecified type 07/25/2017     Priority: Medium     Generalized anxiety disorder 05/23/2017     Priority: Medium     Adjustment disorder with mixed anxiety and depressed mood- mild-moderate  at this time 05/11/2017     Priority: Medium     Family history of celiac disease 05/11/2017     Priority: Medium     Basal cell carcinoma of leg, right 05/01/2016     Priority: Medium     Sun-damaged skin 04/21/2016     Priority: Medium     Seasonal allergic rhinitis 04/21/2016     Priority: Medium     Dupuytren's disease  of palm - right 4th digit flexor tendon 06/11/2015     Priority: Medium     History of migraine headaches- assoc. with nausea/vomiting - resolved around menopause - were  premenstrual  01/08/2014     Priority: Medium     Rosacea 05/01/2013     Priority: Medium     Numbness of feet- distal feet R>L  02/21/2011     Priority: Medium     NUMBNESS AND TINGLING OF FOOT - bilateral -mild  08/25/2010     Priority: Medium     Osteopenia 01/02/2009     Priority: Medium     Lipidosis 10/24/2006     Priority: Medium     Problem list name updated by automated process. Provider to review       Localized osteoarthritis of hand 11/01/2005     Priority: Medium     Problem list name updated by automated process. Provider to review       Stress incontinence, female - mild 10/23/2012     Priority: Low     Cough 10/23/2012     Priority: Low     Acute bronchitis- recurrent- ? related to mold in school building- pt has since retired and no further bronchitis 10/23/2012     Priority: Low     Postmenopausal      Priority: Low     DERMATOPHYTOSIS OF NAIL- toenails  03/14/2006     Priority: Low     trichophyton rubrum on culture - 1/06        Synovial cyst of popliteal space      Priority: Low     Hidradenitis 12/05/2003     Priority: Low       Psychosocial & Contextual Factors: see HPI above    Assessment:  From Intake, 5/12/2022:  Jolynn Haji is a 66-year-old female with a history including anxiety, depression, insomnia status post concussion in April 2017.  No major mental health struggles prior to concussion in 2017.  Patient was started on Lexapro, quetiapine, mirtazapine to help with her symptoms.  Patient had been feeling much better on that medication combination and once she was sleeping well again quetiapine and mirtazapine were discontinued.  This past January the patient started to have some worsening symptoms and Abilify was added.  Patient has felt like her current medications of Lexapro and Abilify have been helpful but reports  starting to feel really slowed and numb the past several weeks.  She also reported a weight loss of nearly 20 pounds over the last 6 weeks versus several months (patient was not very sure). I am wondering if she could be experiencing side effects of her increased Lexapro dose.  She is agreeable to decreasing her dose slightly to 15 mg daily.  Could also consider decreasing Abilify in the case it is not her Lexapro.  She also desires to sleep a little bit better than she has been.  Could consider restarting mirtazapine at bedtime for sleep and to also augment her Lexapro.  Could also be beneficial for patient to undergo cognitive screening to check in and see what her baseline is status post concussion.  No acute safety concerns.  No SI.  No problematic drug or alcohol use.    5/24/2022:  Patient with some slight improvements of possible medication related side effects after decreasing Lexapro.  I am wondering if there might be more robust improvements with decreasing Abilify/aripiprazole.  Possible drug-induced Parkinsonism?  She does seem to have delayed/slowed speech, masked facies.  Her gait and other movements unable to be observed but she does report other struggles that may be consistent with such a presentation.  We are going to decrease her Abilify down to 2 mg daily and I will see her back in just a few weeks.  We will likely discontinue her Abilify at her next visit.  I will message her primary care provider to get some collateral regarding previous baseline functioning prior to starting Abilify.  History of TBI could be confounding presentation.  Could consider neurology consult.    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  Instructed to stop Abilify today.  We will monitor for improvement of symptoms.  If symptoms do not improve may need to refer to neurology.  If symptoms start to improve we will consider low-dose methylphenidate augmentation of Lexapro next visit to  help with low energy, mood, focus and concentration, cognition, s/p TBI if still clinically indicated.  We would discuss risks and benefits with patient and her daughter at that time.  No other acute safety concerns or SI.  No problematic drug or alcohol use.  If patient starts to struggle with sleep would restart mirtazapine at bedtime.    7/28/2022:  Pt still with sxs consistent with drug-induced Parksinsonism.  Neurology consult will be placed so that patient might get scheduled sooner than later due to the typically long wait.  We will start Ambien in hopes we can get her sleeping much better.  Discussed risks and benefits of its use, particularly in the geriatric population.  Lexapro has not been as helpful as it helped for her anxiety.  We will taper this medication and start Effexor/venlafaxine in hopes that might be more effective for her symptoms.  No acute safety concerns.  No SI.  No problematic drug or alcohol use.    8/11/2022:  Overall with continued improved symptoms.  Hopefully patient will continue to have improvement of her symptoms off of Abilify.  Patient will be seeing neurology soon.  Encouraged to keep appointment.  We will continue taper off of Lexapro and continue on monotherapy with venlafaxine.  We will also continue Ambien at this time for sleep since patient is sleeping much better on the medication with improved symptoms and tolerating well.  No acute safety concerns.  No problematic drug or alcohol use.    9/1/2022:  Ongoing anxiety, still intense at times.  Still not sleeping very well.  Could consider sleep evaluation, but patient quite overwhelmed right now with various referrals and doctors appointments.  Fraciscolavon had been working quite well but not keeping her sleep now.  Still falling asleep relatively well.  We will transition to Ambien CR to see if that is more helpful.  We will continue to consider sleep evaluation to rule out possibility of REM behavior sleep disorder.  We will  also transition venlafaxine to bedtime to see if that helps with any possible nausea.  Working with neurology to monitor for Parkinson's disease.  I do recommend neuropsychological testing due to ongoing possible cognitive difficulties/struggles.  No acute safety concerns.  No SI.  No problematic drug or alcohol use.    10/3/2022:  Patient overall with some ongoing anxiety but slowly improving since it got worse late August.  Recommend continuing to optimize Effexor-XR, especially since patient tolerating well.  No longer having any nausea now that dose is taken at bedtime.  Patient feeling a little foggy and off balance with Ambien CR and so we will switch back to Ambien immediate release.  No falls.  Recommend increasing Effexor-XR first and once well tolerated switching back to Ambien immediate release.  May need to continue to work on alternative options for sleep if does not do well back on Ambien immediate release.  No acute safety concerns.  No SI.  No problematic drug or alcohol use.  Encouraged to continue working with neurology.  Next appointment in November.    10/24/2022:  Patient overall with some improved anxiety on increased dose of Effexor-XR.  Could use additional improvement of anxiety and so we will continue to optimize therapy with Effexor-XR.  We will increase dose to 150 mg daily.  Tolerating well with no notable negative side effects.  Patient transitioned back to Ambien CR and is sleeping better on this compared to Ambien immediate release.  We will continue Ambien CR for sleep.  Discussed possibility of sleep medicine referral but patient declines at this time.  Could consider in the future.  No acute safety concerns.  No SI.  No problematic drug or alcohol use.  Encouraged to continue working with neurology.    12/09/2022:  Patient overall doing fairly well.  Feels like increased dose of venlafaxine has been helpful.  I do wonder if she is having some subtle withdrawal symptoms late in the  afternoon with headache and fatigue.  Discussed shifting her dose to the morning.  However, she reports she will try to drink more water and see if that is helpful before trying to make changes to her medication.  She feels like things are going well enough that she really does not want to change anything.  Encouraged to continue in therapy.  No acute safety concerns.  No SI.  No problematic drug or alcohol use.    1/19/2023:  Patient overall doing well with ongoing improvements.  Patient even recently starting to drive again which is a big deal.  She continues in therapy and finds it helpful.  Discussed possible changes to her dosing to see if headaches might improve, along with fatigue.  Discussed possibly splitting her Effexor-XR dose as 75 mg twice daily versus 37 point 5 in the morning and 112.5 mg at bedtime.  Patient feels like she is in a good place right now and really does not want to make any medication changes if possible.  We will keep things the same for now.  Encouraged to drink lots of water.  No acute safety concerns.  No SI.  No problematic drug or alcohol use.    3/9/2023:  Since last visit we decreased Effexor-XR just slightly.  Unfortunately her anxiety has worsened on decreased dose.  Headaches and fatigue did not improve at all.  Since anxiety worsened we will go back up to 150 mg daily.  Patient prefers this over a trial of further decreased dose.  We will start a trial with propranolol to see if this helps improve anxiety and headaches.  May also help with her tremor.  Discussed watching for feeling too lightheaded or dizzy.  Patient also on low-dose lisinopril.  Last vital signs blood pressure 132/78 and pulse 96 on 2/24/2023.  No acute safety concerns.  No SI.  No problematic drug or alcohol use.    3/23/2023:  Patient overall doing very well.  Propranolol has been quite helpful.  Still having some anxiety and headaches so we will continue to optimize therapy with propranolol some.  We  will increase to 20 mg twice daily.  Neuropsychiatric testing reviewed.  Some mild deficits but nothing indicating a neurocognitive disorder at this time.  This was reassuring to the patient.  Patient will review results with neurologist at upcoming appointment in May.  No acute safety concerns.  No SI.  No problematic drug or alcohol use.    4/27/2023:  Patient overall with relative stability of mood and anxiety symptoms.  We will work to discontinue Ambien to see if we can manage on medication such as trazodone.  I still continue to worry about possibility of prodromal Parkinson's disease.  Patient reported her sister noted patient to be yelling and screaming in her sleep the other night.  This concerns me for possible REM sleep behavior disorder.  Could simply be related to antidepressant therapy-but given recent parkinsonism response to neuroleptic, symptoms should continue to be monitored closely.  Could again consider sleep medicine referral at some point in the future if needed.  Patient also continues to have headaches.  If patient remains stable would like to trial decreased dose of Effexor-XR to see if this helps improve headaches.  No acute safety concerns.  No SI.  No problematic drug or alcohol use.    5/11/2023:  Patient overall doing well.  Was able to replace Ambien with trazodone and still sleeping well.  Still some headaches but greatly improved off Ambien.  Went back to taking propranolol 20 mg twice daily and finds this most effective for her anxiety, tremor, etc.  Discussed we could consider lowering venlafaxine ER in a couple of months if still doing really well and still having some headaches.  Patient has a neurology appointment tomorrow for check-in.  No acute safety concerns.  No SI.  No problematic drug or alcohol use.    7/11/2023:  Patient overall doing well still.  Mood has remained stable.  Anxiety overall manageable and stable as well.  Unfortunately continues to have headaches and  feel fatigued.  Discussed I would like to move ahead with a trial of decreased venlafaxine.  Patient agreeable and will monitor mental health symptoms.  No acute safety concerns.  No SI.  No problematic drug or alcohol use.    Medication side effects and alternatives were reviewed. Health promotion activities recommended and reviewed today. All questions addressed. Education and counseling completed regarding risks and benefits of medications and psychotherapy options. Recommend therapy for additional support.     Treatment Plan:     Continue trazodone 25-50 mg at bedtime as needed for sleep    Decrease Effexor-XR/venlafaxine ER:   Week 1-2:  Take 150 mg two days, 112.5 mg one day, 150 mg two days, 112.5 mg one day, etc    Week 3-4:  Take 112.5 mg one day, 150 mg one day, 112.5 mg one day, etc    Week 5:  Take 112.5 mg daily moving forward      Continue propranolol 20 mg twice daily for anxiety, tremor, headaches.    Continue to follow with neurology as they recommend.      Continue all other cares per primary care provider.     Continue all other medications as reviewed per electronic medical record today.     Safety plan reviewed. To the Emergency Department as needed or call after hours crisis line at [phone] or [phone]. [place] Crisis Text Line. Text [place] to 700127 or Suicide LifeLine Chat: suicidepreventionlifeline.org/chat    Continue individual psychotherapy for additional support and ongoing development of nonpharmacologic coping skills and strategies.    Schedule an appointment with me in 2 months or sooner as needed. Call [facility] at [phone] to schedule.    Follow up with primary care provider as planned or for acute medical concerns.    Call the psychiatric nurse line with medication questions or concerns at [phone].    [employer] may be used to communicate with your provider, but this is not intended to be used for emergencies.    Administrative Billing:   Phone  Call/Video Duration: 12 Minutes  Start: 1:35p  Stop: 1:48p    Patient Status:  Patient will continue to be seen for ongoing consultation and stabilization.    Signed:   Judy Pompa DO  [facility]    Disclaimer: This note consists of symbols derived from keyboarding, dictation and/or voice recognition software. As a result, there may be errors in the script that have gone undetected. Please consider this when interpreting information found in this chart.

## 2023-07-12 ENCOUNTER — OFFICE VISIT (OUTPATIENT)
Dept: FAMILY MEDICINE | Facility: CLINIC | Age: 68
End: 2023-07-12
Payer: COMMERCIAL

## 2023-07-12 VITALS
OXYGEN SATURATION: 100 % | WEIGHT: 141 LBS | DIASTOLIC BLOOD PRESSURE: 78 MMHG | HEART RATE: 82 BPM | BODY MASS INDEX: 21.37 KG/M2 | SYSTOLIC BLOOD PRESSURE: 132 MMHG | RESPIRATION RATE: 16 BRPM | TEMPERATURE: 97 F | HEIGHT: 68 IN

## 2023-07-12 DIAGNOSIS — F51.02 ADJUSTMENT INSOMNIA: ICD-10-CM

## 2023-07-12 DIAGNOSIS — Z12.11 SCREEN FOR COLON CANCER: ICD-10-CM

## 2023-07-12 DIAGNOSIS — F41.1 GENERALIZED ANXIETY DISORDER: ICD-10-CM

## 2023-07-12 DIAGNOSIS — F43.23 ADJUSTMENT DISORDER WITH MIXED ANXIETY AND DEPRESSED MOOD: Primary | ICD-10-CM

## 2023-07-12 DIAGNOSIS — D04.9 SQUAMOUS CELL CARCINOMA IN SITU (SCCIS) OF SKIN: ICD-10-CM

## 2023-07-12 DIAGNOSIS — G21.19 DRUG-INDUCED PARKINSONISM (H): ICD-10-CM

## 2023-07-12 DIAGNOSIS — I10 ESSENTIAL HYPERTENSION WITH GOAL BLOOD PRESSURE LESS THAN 140/90: ICD-10-CM

## 2023-07-12 PROCEDURE — 99214 OFFICE O/P EST MOD 30 MIN: CPT | Performed by: FAMILY MEDICINE

## 2023-07-12 RX ORDER — LISINOPRIL 2.5 MG/1
2.5 TABLET ORAL DAILY
Qty: 90 TABLET | Refills: 1 | Status: SHIPPED | OUTPATIENT
Start: 2023-07-12 | End: 2023-12-06

## 2023-07-12 NOTE — PROGRESS NOTES
Assessment & Plan :       ICD-10-CM    1. Adjustment disorder with mixed anxiety and depressed mood- mild-moderate  at this time  F43.23       2. Essential hypertension with goal blood pressure less than 140/90  I10 lisinopril (ZESTRIL) 2.5 MG tablet      3. Screen for colon cancer- has colonoscopy scheduled for end of August 2023   Z12.11 CANCELED: Colonoscopy Screening  Referral      4. Adjustment insomnia - resolved as of 5/5/2023 - off of ambien with psychiatry's okay   F51.02       5. Drug-induced parkinsonism (H)- Abilify - generic = ARIPiprazole( 5mg) - RESOLVED OFF ABILIFY   G21.19       6. Generalized anxiety disorder  F41.1       7. 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 SUE for 40 minute appointment.     Future Appointments 7/12/2023 - 1/8/2024        Date Visit Type Length Department Provider     7/19/2023 10:30 AM [facility] RETURN 60 min  BEHAVIORAL HEALTH Erika Francisco, [facility]    Location Instructions:     Your appointment is at [facility] located in the [facility] at [address] [place] [place] [address]. Please check in at the  in [address].              7/27/2023  8:15 AM MOHS 15 min  DERMATOLOGY Sheldon Rogers MD    Location Instructions:     [address] [place] [id number]              8/3/2023  8:15 AM MOHS 15 min  DERMATOLOGY Sheldon Rogers MD    Location Instructions:     [address] [place] [id number]              9/27/2023  5:00 PM OFFICE VISIT 40 min  FAMILY PRACTICE Helga Ibarra MD    Location Instructions:     [facility] is located at [address], [address] Free parking is available; access the lot by turning north from [address] onto [address], then west onto  [address].              10/10/2023 11:30 AM [facility] RETURN 30 min [facility] Judy Pompa DO    Location Instructions:     [facility] has 2 buildings on its campus. Please visit us at [address] [place] [address] [address], [place], [place] and enter the building with the numbers [phone] on it.                     Pt declined covid-19 booster today  - will get at next visit.     Discussed sun precautions - see AVS.     37 minutes face to face time today.     Ordering of each unique test  Prescription drug management  39 minutes spent by me on the date of the encounter doing chart review, history and exam, documentation and further activities per the note       MEDICATIONS:  Continue current medications without change  Regular exercise  See Patient Instructions         Helga Ibarra MD  [facility] PRIOR PJ Tristan is a 67 year old, presenting for the following health issues:  Recheck Medication    1. Adjustment disorder with mixed anxiety and depressed mood- mild-moderate  at this time    2. Essential hypertension with goal blood pressure less than 140/90    3. Screen for colon cancer- has colonoscopy scheduled for end of August 2023     4. Adjustment insomnia - resolved as of 5/5/2023 - off of ambien with psychiatry's okay     5. Drug-induced parkinsonism (H)- Abilify - generic = ARIPiprazole( 5mg) - RESOLVED OFF ABILIFY             7/12/2023     5:09 PM   Additional Questions   Roomed by LILI BENAVIDEZ   Accompanied by SELF     History of Present Illness       Mental Health Follow-up:  Patient presents to follow-up on Depression & Anxiety.Patient's depression since last visit has been:  Good  The patient is not having other symptoms associated with depression.  Patient's anxiety since last visit has been:  Good  The patient is having other symptoms associated with anxiety.  Any significant life events: No  Patient is feeling anxious or having panic  attacks.  Patient has no concerns about alcohol or drug use.    She eats 4 or more servings of fruits and vegetables daily.She consumes 0 sweetened beverage(s) daily.She exercises with enough effort to increase her heart rate 20 to 29 minutes per day.  She exercises with enough effort to increase her heart rate 5 days per week.   She is taking medications regularly.     SLEEPING 8 HOURS A NIGHT ON TRAZODONE.  Awakens feeling rested.  Then feels tired at the end of the day after working more. She's walking her dog Casper 1.5 miles/day.    Last appointment with psychiatry was yesterday.  She is decreasing her venlaxafine - see below dosing.      Social History     Tobacco Use    Smoking status: Never    Smokeless tobacco: Never   Substance Use Topics    Alcohol use: Yes     Alcohol/week: 0.0 - 1.0 standard drinks of alcohol     Comment: 2 per month    Drug use: No     Comment: no herbal meds either         5/3/2023     1:18 PM 6/12/2023     8:47 PM 7/11/2023     7:37 PM   PHQ   PHQ-9 Total Score 1 1 1   Q9: Thoughts of better off dead/self-harm past 2 weeks Not at all Not at all Not at all         5/3/2023     1:18 PM 7/10/2023    10:54 AM 7/11/2023     7:38 PM   TIFFANY-7 SCORE   Total Score 3 (minimal anxiety) 2 (minimal anxiety) 1 (minimal anxiety)   Total Score 3 2 1         7/11/2023     7:37 PM   Last PHQ-9   1.  Little interest or pleasure in doing things 0   2.  Feeling down, depressed, or hopeless 0   3.  Trouble falling or staying asleep, or sleeping too much 0   4.  Feeling tired or having little energy 1   5.  Poor appetite or overeating 0   6.  Feeling bad about yourself 0   7.  Trouble concentrating 0   8.  Moving slowly or restless 0   Q9: Thoughts of better off dead/self-harm past 2 weeks 0   PHQ-9 Total Score 1         7/11/2023     7:38 PM   TIFFANY-7    1. Feeling nervous, anxious, or on edge 1   2. Not being able to stop or control worrying 0   3. Worrying too much about different things 0   4. Trouble  relaxing 0   5. Being so restless that it is hard to sit still 0   6. Becoming easily annoyed or irritable 0   7. Feeling afraid, as if something awful might happen 0   TIFFANY-7 Total Score 1   If you checked any problems, how difficult have they made it for you to do your work, take care of things at home, or get along with other people? Not difficult at all     Still seeing Erika, her therapist every other week and going well.     [place]  and she hasn't jumped in the lake for 2 years and did  so off their boat.    Still has some mild anxiety feelings that wax and wane especially in large or crowded places - had to leave [employer] the other day as it was really crowded and they had moved store items around.    Still getting some heart palpitations on and off when her anxiety flares.  Hasn't needed to take any prn propranolol .  Discussed that she can take 1 additional 20mg propranolol during the day for anxiety/heart palpitations, if needed.  No chest pain or discomfort with exertion , but gets a little SOB if she runs up the stairs, that comes and goes. Not consistent, just when she's more anxious.  Decreased her caffeine drinking to 1 cup of coffee in the morning.         HTN: pt prefers the 2.5mg tabs so she doesn't have to cut the 5mg in half. Please note dosage adjustment . See change in orders/med list.      Last Basic Metabolic Panel:  Lab Results   Component Value Date     02/08/2023    POTASSIUM 4.1 02/08/2023    CHLORIDE 103 02/08/2023    CO2 25 02/08/2023    ANIONGAP 13 02/08/2023    GLC 95 02/08/2023    BUN 16.2 02/08/2023    CR 0.62 02/08/2023    GFRESTIMATED >90 02/08/2023    OREN 9.7 02/08/2023           Creatinine   Date Value Ref Range Status   02/08/2023 0.62 0.51 - 0.95 mg/dL Final   01/13/2021 0.68 0.52 - 1.04 mg/dL Final      GFR Estimate   Date Value Ref Range Status   02/08/2023 >90 >60 mL/min/1.73m2 Final     Comment:     eGFR calculated using 2021 CKD-EPI  equation.   04/25/2022 83 >60 mL/min/1.73m2 Final     Comment:     Effective December 21, 2021 eGFRcr in adults is calculated using the 2021 CKD-EPI creatinine equation which includes age and gender (Mercedes koroma al., [facility], DOI: 10.1056/AVXJwg4518526)   01/31/2022 >90 >60 mL/min/1.73m2 Final     Comment:     Effective December 21, 2021 eGFRcr in adults is calculated using the 2021 CKD-EPI creatinine equation which includes age and gender (Mercedes et al., [facility], DOI: 10.1056/HWNVvn3349585)   01/13/2021 >90 >60 mL/min/[1.73_m2] Final     Comment:     Non  GFR Calc  Starting 12/18/2018, serum creatinine based estimated GFR (eGFR) will be   calculated using the Chronic Kidney Disease Epidemiology Collaboration   (CKD-EPI) equation.     12/11/2019 90 >60 mL/min/[1.73_m2] Final     Comment:     Non  GFR Calc  Starting 12/18/2018, serum creatinine based estimated GFR (eGFR) will be   calculated using the Chronic Kidney Disease Epidemiology Collaboration   (CKD-EPI) equation.     11/30/2018 70 >60 mL/min/1.7m2 Final     Comment:     Non  GFR Calc           Suicide Assessment Five-step Evaluation and Treatment (SAFE-T)        Patient Active Problem List   Diagnosis    Essential hypertension with goal blood pressure less than 140/90    Hidradenitis    Localized osteoarthritis of hand    Synovial cyst of popliteal space    DERMATOPHYTOSIS OF NAIL- toenails     Lipidosis    Osteopenia    Postmenopausal    NUMBNESS AND TINGLING OF FOOT - bilateral -mild     Numbness of feet- distal feet R>L     Stress incontinence, female - mild    Cough    Acute bronchitis- recurrent- ? related to mold in school building- pt has since retired and no further bronchitis    Rosacea    History of migraine headaches- assoc. with nausea/vomiting - resolved around menopause - were  premenstrual     Dupuytren's disease of palm - right 4th digit flexor tendon    Sun-damaged skin    Seasonal allergic rhinitis     Basal cell carcinoma of leg, right    Adjustment disorder with mixed anxiety and depressed mood- mild-moderate  at this time    Family history of celiac disease    Generalized anxiety disorder    Insomnia, unspecified type    Chronic constipation    Raynaud's phenomenon without gangrene    Other secondary osteoarthritis of left knee    Primary localized osteoarthrosis of left lower leg    Hyperlipidemia LDL goal <130    Basal cell carcinoma    Colon polyps    Screening for cervical cancer    Adjustment insomnia - resolved as of 5/5/2023 - off of ambien with psychiatry's okay     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 - much improved - resolved as of 5/5/2023     Drug-induced parkinsonism (H)- Abilify - generic = ARIPiprazole( 5mg)     Mood disorder (H)       Current Outpatient Medications   Medication Sig Dispense Refill    clindamycin (CLINDAGEL) 1 % external gel Apply topically daily 60 g 11    lactobacillus rhamnosus, GG, (CULTURELL) capsule Take 1 capsule by mouth daily Taking every other day      lisinopril (ZESTRIL) 5 MG tablet Takes 1/2 tablet to equal 2.5 mg      propranolol (INDERAL) 20 MG tablet Take 1 tablet (20 mg) by mouth 2 times daily 180 tablet 1    traZODone (DESYREL) 50 MG tablet Take 0.5-1 tablets (25-50 mg) by mouth nightly as needed for sleep 90 tablet 1    venlafaxine (EFFEXOR XR) 37.5 MG 24 hr capsule Take one cap by mouth WITH 75 mg cap for total daily dose 112.5 mg. 90 capsule 1    venlafaxine (EFFEXOR XR) 75 MG 24 hr capsule Take one cap by mouth WITH 37.5 mg cap for total daily dose 112.5 mg. 90 capsule 1          Allergies   Allergen Reactions    Abilify [Aripiprazole] Other (See Comments)     Suspected drug-induced Parkinsonism     Fosamax Diarrhea     Diarrhea, stomach cramps, jaw pain     Amoxicillin-Pot Clavulanate Itching and Rash    Amoxicillin-Pot Clavulanate Itching and Rash          Review of Systems   Constitutional,  "HEENT, cardiovascular, pulmonary, GI, , musculoskeletal, neuro, skin, endocrine and psych systems are negative, except as otherwise noted.      Objective  :   /78   Pulse 82   Temp 97  F (36.1  C)   Resp 16   Ht 1.715 m (5' 7.5\")   Wt 64 kg (141 lb)   LMP 06/28/2001   SpO2 100%   BMI 21.76 kg/m    Body mass index is 21.76 kg/m .  Physical Exam :   GENERAL: healthy, alert and no distress  EYES: Eyes grossly normal to inspection, PERRL and conjunctivae and sclerae normal  HENT: ear canals and TM's normal, nose and mouth without ulcers or lesions  MS: no gross musculoskeletal defects noted, no edema  SKIN: no suspicious lesions or rashes  NEURO: Normal strength and tone, mentation intact and speech normal  PSYCH: mentation appears normal, affect normal/bright    Office Visit on 05/16/2023   Component Date Value Ref Range Status    Case Report 05/16/2023    Final                    Value:Surgical Pathology Report                         Case: [id number]                                  Authorizing Provider:  Ligia Carbajal PA-C    Collected:           05/16/2023 10:41 AM          Ordering Location:     [facility]   Received:            05/16/2023 04:11 PM                                 [facility]                                                           Pathologist:           Zach Qiu MD                                                       Specimens:   A) - Skin, central back                                                                             B) - Skin, right lower leg                                                                          C) - Skin, right medial chest                                                              Final Diagnosis 05/16/2023    Final                    Value:This result contains rich text formatting which cannot be displayed here.    Clinical Information 05/16/2023    Final                    Value:This result contains rich " text formatting which cannot be displayed here.    Gross Description 05/16/2023    Final                    Value:This result contains rich text formatting which cannot be displayed here.    Microscopic Description 05/16/2023    Final                    Value:This result contains rich text formatting which cannot be displayed here.    MCRS 05/16/2023 Yes (A)  N/A Final    Performing Labs 05/16/2023    Final                    Value:This result contains rich text formatting which cannot be displayed here.     Final Diagnosis   A(1). Central back:  - Junctional dysplastic nevus with moderate atypia - (see description)      B(2). Right lower leg:  - Squamous cell carcinoma in situ - (see description)      C(3). Right medial chest:  - Squamous cell carcinoma in situ - (see description)         Discussed sun precautions in detail.

## 2023-07-12 NOTE — PATIENT INSTRUCTIONS
" [address] [address]  Office: [phone]   Fax:    [phone]       Let me know if your shortness of breath with exertion gets more consistent or worse.   Follow Dr. Pompa's schedule for decreasing your venlafaxine to hopefully help your headaches.      Patient Instructions    Tips for avoiding skin cancer and premature skin aging:  Wear sunscreen on face every day even in winter. UVA penetrates window glass and is just as strong in the winter as it is in the summer. Sunscreen with SPF > 30 is recommended.  Avoid mid-day sunshine (10 AM to 3 PM) if possible.  Never use a tanning bed. They are associated with much higher risks of skin cancer. There is no merit in getting \"a base tan\" before a warm weather vacation. All tanning damages the skin.  Wear a wide brimmed hat and sunglasses.  Wear  sun protective clothing such as [employer] ( local company out of [facility]- they guarantee their UPF 50+sun protection for the life of the garment -  or [employer] ( Sunbrella) that is stylish, comfortable and cool. Try their web site for sales.  If you smoke, stop. Smokers have higher rates of skin cancer and also have premature wrinkling.  Note that spray sunscreens are only for re-application, not for initial applications. Caution around kids who may breathe in the chemicals.  At the beach, it is recommended to use 2 ounces (one shot glass) of sunscreen and to reapply every 2 hours. This means an 8 ounce bottle or tube of sunscreen contains four applications.  Any tan indicates sun damage. Aim for ivory skin year round and you will have less trouble with your skin in years to come.    Chemical free sunscreens (better for sensitive skin) include  -Vanicream SPF 30 and 50+  - EltaMD tinted mineral sunscreen, SPF 41 (for face)  - Blue Lizard  -Neutrogena Pure and Free .               "

## 2023-07-17 NOTE — PROGRESS NOTES
[facility]  July 19, 2023      Behavioral Health Clinician Progress Note    Patient Name: Jolynn Haji           Service Type:  Individual      Service Location:   MyChart / Email (patient reached)     Session Start Time: 10:30 am   Session End Time: 11:12 am      Session Length: 38 - 52      Attendees: Patient     Service Modality:  Video Visit:      Provider verified identity through the following two step process.  Patient provided:  Patient is known previously to provider    Telemedicine Visit: The patient's condition can be safely assessed and treated via synchronous audio and visual telemedicine encounter.      Reason for Telemedicine Visit: Patient has requested telehealth visit    Originating Site (Patient Location): Patient's home    Distant Site (Provider Location): [facility] [place]    Consent:  The patient/guardian has verbally consented to: the potential risks and benefits of telemedicine (video visit) versus in person care; bill my insurance or make self-payment for services provided; and responsibility for payment of non-covered services.     Patient would like the video invitation sent by:  My Chart    Mode of Communication:  Video Conference via [employer]    As the provider I attest to compliance with applicable laws and regulations related to telemedicine.    Visit Activities (Refresh list every visit): [facility] Only    Diagnostic Assessment Date: 9/27/22  Treatment Plan Review Date: 7/27/23  See Flowsheets for today's PHQ-9 and TIFFANY-7 results  Previous PHQ-9:       5/3/2023     1:18 PM 6/12/2023     8:47 PM 7/11/2023     7:37 PM   PHQ-9 SCORE   PHQ-9 Total Score MyChart 1 (Minimal depression) 1 (Minimal depression) 1 (Minimal depression)   PHQ-9 Total Score 1 1 1     Previous TIFFANY-7:       5/3/2023     1:18 PM 7/10/2023    10:54 AM 7/11/2023     7:38 PM   TIFFANY-7 SCORE   Total Score 3 (minimal anxiety) 2 (minimal anxiety) 1  "(minimal anxiety)   Total Score 3 2 1       KARINA LEVEL:      8/27/2010     3:00 PM 2/22/2011    11:00 AM   KARINA Score (Last Two)   KARINA Raw Score 51 49   Activation Score 91.6 82.8   KARINA Level 4 4       DATA  Extended Session (60+ minutes): No  Interactive Complexity: No  Crisis: No  [facility] Patient: No    Treatment Objective(s) Addressed in This Session:  Target Behavior(s): anxiety and depression    Depressed Mood: Increase interest, engagement, and pleasure in doing things  Decrease frequency and intensity of feeling down, depressed, hopeless  Improve quantity and quality of night time sleep / decrease daytime naps  Feel less tired and more energy during the day   Improve diet, appetite, mindful eating, and / or meal planning  Identify negative self-talk and behaviors: challenge core beliefs, myths, and actions  Improve concentration, focus, and mindfulness in daily activities   Feel less fidgety, restless or slow in daily activities / interpersonal interactions  Anxiety: will experience a reduction in anxiety, will develop more effective coping skills to manage anxiety symptoms, will develop healthy cognitive patterns and beliefs and will increase ability to function adaptively    Current Stressors / Issues:  Reports doing well with recent med changes as a result of continued headaches. She has not noticed any changes in anxiety sx. Continues to sleep well. Discussed use of skills for anxiety.     7/10/23  Reports she had a really good time with family over the 4th of July holiday. Reports anxiety has been good. Reporting some heart palpitations and shallow of breath [place] Discussed utilizing family more to decrease some of her anxiety. She is not doing everything anymore and becoming more comfortable with asking for help. Sleep continues to improve, 7-8hrs. Denies feeling groggy or having a \"hang overs\" in the afternoon. Reduced headaches, tends to come when anxiety is higher. Feels " headaches are much more manageable.     6/27/23  Reports several nights of 8-9hs a night. Feels more clear headed, better memory, and more energized. Discussed times of higher anxiety or racing thoughts in public settings. Discussed ways of lowering anxiety in those events.      6/13/23  Reporting being more busy with jobs [place] and [place] has helped with her anxiety. Went to graduation party over the weekend with many people. This went well with conversation. Did have some conversations that were uncomfortable with individuals who expressed concern over how she was a year ago. Discussed role with friend who is suffering with MH. Processed advantages and disadvantages of involving self further in her health care decisions.     5/31/23  Reported she had a [place]. Reporting they have followed up with husbands pancreas. MDs have recommended removing it completely. They have to make many decisions with his health. Anxiety has been high as a result. They have time to make decisions but all options are not ideal. Discussed maintaining healthy skills for dealing with anxiety.       5/17/23  Went to see friend with high anxiety. She said conversation was good and she was able to help with relating to her. Discussed challenging self to drive further distances. This has been going really well for her. Discussed husbands doctors appt and decision to have surgery or not.      5/11/23 ([facility])  MH update: Reports feeling more clear headed on trazodone and off Ambien. She is taking half the pill at night. Headaches have been less and less sever. No change in sleep. She has been more active in doing things she would have done over a year ago. Not stressing about the cabin remodel. She has been spending time with grandchildren. Notes family has really noticed difference in her mental health. Reports reading recommended books by [facility]. Going to see friend struggling with  "mental health.   Stresses: [place]  Appetite: unremarkable  Sleep: unremarkable  Therapy: weekly therapy  FARIDA: na  Preg: na  Most important: Does it matter if she alters the dose of trazodone from day to day.     5/1/23  Reports they drove to the [place] this weekend. Feels things are progressing well. Discussed switching medication for sleep to Trazodone. She has been sleeping the same on Ambien. She is taking a half pill and not having any grogginess. She has been engaged with friends and [facility]. Recommended Gifts of Imperfections and I Thought It Was Just Me by Darrick Arteaga. Also Decluttering Your Mind by CAT Melgar. Discussed having others notice positive changes in her. She struggles with having that spot light on her.       [facility]  MH update: doing well with heightened anxiety but not panic attacks.   Stresses: [place] renovations   Appetite: unremarkable  Sleep: unremarkable  Therapy: seeing therapist weekly, reports sister came for several days that was great. Making decision about traveling for family wedding.   FARIDA: na  Preg: na  Most important: continues to have headaches daily. Wants to discuss Ambien with MD/PCP suggested getting off completely. Would like to have Propanol for 10 mg to not have to cut.      4/19/23  Reports managing her anxiety around renovation of [place] bathroom has been difficult. Discussed desire for control and letting go.Reports sister is coming for several days.  is having surgery tomorrow. She has been walking and exercising more.      4/12/23  Reports it she is doing well and had a great Easter with family. Discussed more about her anxiety (ABC), being self blaming (what ifs/should have) at nature but at other times unknown source. Discussed eliminating \"should' for vocabulary due to shame, self judgement, and regret associated with the word. She will continue to work on ABC and look at the use of should.     3/31/23  Reports she is doing well. " "Anxiety has been pretty good but has headaches when anxious. Looking forward to going to theater with grandchildren. Medications have been very helpful. Discussed ABC of Anxiety. Discussed events of anxiety at its worse. Discussed the beliefs she has about anxiety events. Discussed consequences of event.     3/23/23 [facility] update: Reports anxiety has decreased a lot on the propanol. Continues to have headaches but only about half the days. She has noticed a stronger correlation with headaches and anxiety. She completed Neuro Psych testing. Relieved she has no new dx. Planning on walking with dog. She is feeling more like her self, \"5 star days.\"    Stresses: Neuro psych testing and going to wake alone was stressful. Felt she managed very well.   Appetite: unremarkable  Sleep: unremarkable  Therapy: Continues to do weekly therapy. [place] she worried a lot about testing and did not , lesson in not worrying about things that have not happened yet. Feeling very accomplished after attending a wake alone without her . She stated it went very well and anxiety was lower than normal and then expected. Discussed decision making in the moment. Discussed it can be difficult to make decisions in the moment. Continues with coping skills, ie meditation and breathing tch.   FARIDA: na  Preg: na  Most important: Wants to address doses of medications.     3/14/23  Feels the propanol has been really helping with anxiety. She has been feeling like her self for the past 5 days. Continues to have several headaches. She is considering going back to yoga after looking as some neck stretches.     3/9/23 ([facility])  She has been more anxious on the different dosing of medications. She has also not noticed decrease in headaches. Discussed neck tension and using body scanning. She continues to feel a bit foggy in the afternoons.     3/3/23  Reports doing well. States she has been in communication with MD about headaches. Has noticed on days " "when she has lower dose of med she has more anxiety. She has been examining her anxiety when going out socially. Does not feel it is associated with the socializing itself but rather \"will the anxiety come up.\" Discussed upcoming appt for neuro psych testing. Is hesitant but willing to do testing. May want to talk to MD about it prior appt. Fearful about Drug Induced Parkinson's.       2/17/23  Pt reports she is doing well. Reports anxiety has been better. Able to control it better with accepting and reognition of anxiety. Continues to utilize meditation and review of skills taught in therapy. Ice packs has been helping with headaches. Self talk has much improved and has gained more confidence. Anxiety in the evening have improved but still feeling the anxiety at times. She has been wanting to work exercise into her daily routine. Sleep has been also improve. ID values: umm, family, self respect responsibility and kindness.       2/10/23  Pt reports she had such a good time with her trip to her sisters house. Much reduced anxious than expected (compared to home) and sleep was really good. Discussed implementing things that worked at [employer] for home. Continues to have some anxiety when going into store or before events. Reports panic attacks are less than previous week. Discussed triggers for anxiety. Discussed do expectations of self meet values.  ID values from next time and look at expectations for self. Pt asked \"What could I do better to be good enough\". Struggles with being good enough for self. Talked about list of things she wants to achieve in order to feel \"good enough\". One being able to go to places with out wondering if she will be anxious.     Identified Triggers for anxiety and background thought behind it:  More anxious when tired: less bandwidth to utilize skills  Feeling going to be late  Making quick decisions  Technology   organizing big meal for company: what people expect of her  MD visits or " waiting for visit: anticipations of events at MD, has not had poor experiences, husbands medical experience   What if something happens to Zach   Packing groceries and people are behind her  Sometimes going out to eat (much better): menu with too many options and having to make decisions  Holiday: anticipation and wanting it to be perfect for people/what ng people expect of her  Dealing with insurance problem: hassle and being out of her control   Being in the unknown about things: Slowness of recovery from anxiety, what ifs  Do people see I am anxious  Large crowd of people: leading up to and during (highest), people might be noticing her, dont want to stand   *Things that need to be done but are not done(floor needing to be washed, getting decorations down etc): likes list to check off (what to accomplish that week), Not accomplishing goal. Self placed expectations (high standards from growing up), perfectionism (mom perfectionism as a child), some self comparisons to others, taking on too much during a week, and expecting to be like old self all the time (compare self to old self).     2/1/23  Pt is reporting she is doing well. She has been out driving to do shopping and seeing friends. Continues to have improvement with anxiety each day. Continues to have headaches but is wanting to change medications until she gets back from trip to her sisters home. Having some anxiety about spending over night for the first time since COVID. Discussed steps to addressing anxiety at her sisters home and drive up. 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 Became dizzy and become worried about the physical sx she was experiencing. Anxiety grew from that point on. Discussed how she handled it in the moment with redirecting thoughts, accepting anxiety and attempting some of the anxiety tech discussed. Pt will explore other situations in which anxiety tends to arise for next session. Pt has been  keeping a journal of her daily experience and will reexamine journal to better understand anxiety. Pt listed several situations she knows brings anxiety.      Identified Triggers:  Feeling rushed  Not wanting to be late  Having others do responsibilities she once carried as her responsibility  Being flexible  Expectations of others  What do others see/think of her when experiencing panic attack    1/23/23  Reports she is doing really well. Reports she will be driving up to see her grandson. She is highly looking forward to this and making [employer] cards. Some anxiety with going in to the grocery store but not once she gets started shopping. Reports her ultimate goal would be to go to the mall alone with traffic as that has been causing anxiety. Headaches continue to be of concern to her. Discussed further and identified them coming following either a panic attack or after high anxiety. Discussed using sensory tech for elevating the anxiety and also for following headache such as heating pad for neck or eyes. Discussed her conversation with Dr. Pompa about possibly changing meds to help with headaches. She reports relishing in current progress and is not wanting meds to change at this time. Headaches do interfere sometimes with daily life. She will be going to lunch with several college friends. This has caused a bit of anxiety in accepting the invite. Normalized experience for pt.       Progress on Treatment Objective(s) / Homework:  Satisfactory progress - ACTION (Actively working towards change); Intervened by reinforcing change plan / affirming steps taken    Motivational Interviewing    MI Intervention: Expressed Empathy/Understanding, Supported Autonomy, Collaboration, Evocation, Open-ended questions, Change talk (evoked) and Reframe     Change Talk Expressed by the Patient: Desire to change Ability to change Reasons to change Need to change    Provider Response to Change Talk: E - Evoked more info from  patient about behavior change, A - Affirmed patient's thoughts, decisions, or attempts at behavior change, R - Reflected patient's change talk and S - Summarized patient's change talk statements      Care Plan review completed: Yes    Medication Review:  No changes to current psychiatric medication(s)    Medication Compliance:  Yes    Changes in Health Issues:   None reported    Chemical Use Review:   Substance Use: Chemical use reviewed, no active concerns identified      Tobacco Use: No current tobacco use.      Assessment: Current Emotional / Mental Status (status of significant symptoms):  Risk status (Self / Other harm or suicidal ideation)  Patient denies a history of suicidal ideation, suicide attempts, self-injurious behavior, homicidal ideation, homicidal behavior and and other safety concerns  Patient denies current fears or concerns for personal safety.  Patient denies current or recent suicidal ideation or behaviors.  Patient denies current or recent homicidal ideation or behaviors.  Patient denies current or recent self injurious behavior or ideation.  Patient denies other safety concerns.  A safety and risk management plan has not been developed at this time, however patient was encouraged to call [place] [phone] should there be a change in any of these risk factors.    Appearance:   Appropriate   Eye Contact:   Good   Psychomotor Behavior: Normal   Attitude:   Cooperative   Orientation:   All  Speech   Rate / Production: Normal    Volume:  Normal   Mood:    Anxious   Affect:    Appropriate   Thought Content:  Clear   Thought Form:  Coherent  Logical   Insight:    Good     Diagnoses:  1. Generalized anxiety disorder    2. Mood disorder (H)        Collateral Reports Completed:  Not Applicable    Plan: (Homework, other):  Patient was given information about behavioral services and encouraged to schedule a follow up appointment with the clinic [facility] in 1 week.  She was also given information about  mental health symptoms and treatment options .  CD Recommendations: No indications of CD issues.  Erika Francisco MSW LICSW      ______________________________________________________________________    Integrated Primary Care Behavioral Health Treatment Plan    Patient's Name: Jolynn Haji  YOB: 1955    Date of Creation: 10/5/22  Date Treatment Plan Last Reviewed/Revised: 4/27/23    DSM5 Diagnoses: 300.02 (F41.1) Generalized Anxiety Disorder  Psychosocial / Contextual Factors: Individual Factors high anxiety associated with depressive sx, interfereing with ability to function as she would like.  .  PROMIS (reviewed every 90 days):     Referral / Collaboration:  Was/were discussed and patient will pursue. - individual therapy    Anticipated number of session for this episode of care: 5-8 sessions  Anticipation frequency of session: Weekly  Anticipated Duration of each session: 38-52 minutes  Treatment plan will be reviewed in 90 days or when goals have been changed.       MeasurableTreatment Goal(s) related to diagnosis / functional impairment(s)  Goal 1: Patient will work with provider to manage symptoms    I will know I've met my goal when when less anxious and feeling down.      Objective #A (Patient Action)    Patient will  attend all sessions.  Status: Continued - Date(s): 4/27/23    Intervention(s)  Therapist will teach educate patient on treatment options, clarify concerns, work with pt to overcome any resistance to compliance..    Objective #B  Patient will identify 3 fears / thoughts that contribute to feeling anxious.  Status: Continued - Date(s): 4/27/23     Intervention(s)  Therapist will educate patient on treatment options, clarify concerns, work with pt to overcome any resistance to compliance..    Objective #C  Patient will identify 3 initial signs or symptoms of anxiety.  Status: Continued - Date(s): 4/27/23     Intervention(s)  Therapist will educate patient on treatment options, clarify  concerns, work with pt to overcome any resistance to compliance..        Patient has reviewed and agreed to the above plan.      Erika Francisco, [facility]  July 19, 2023  Answers for HPI/ROS submitted by the patient on 12/6/2022  TIFFANY 7 TOTAL SCORE: 3  Answers for HPI/ROS submitted by the patient on 3/8/2023  If you checked off any problems, how difficult have these problems made it for you to do your work, take care of things at home, or get along with other people?: Not difficult at all  PHQ9 TOTAL SCORE: 5  Answers for HPI/ROS submitted by the patient on 4/26/2023  If you checked off any problems, how difficult have these problems made it for you to do your work, take care of things at home, or get along with other people?: Somewhat difficult  PHQ9 TOTAL SCORE: 1  Answers for HPI/ROS submitted by the patient on 6/12/2023  If you checked off any problems, how difficult have these problems made it for you to do your work, take care of things at home, or get along with other people?: Not difficult at all  PHQ9 TOTAL SCORE: 1  Answers for HPI/ROS submitted by the patient on 7/18/2023  If you checked off any problems, how difficult have these problems made it for you to do your work, take care of things at home, or get along with other people?: Not difficult at all  PHQ9 TOTAL SCORE: 1

## 2023-07-19 ENCOUNTER — VIRTUAL VISIT (OUTPATIENT)
Dept: BEHAVIORAL HEALTH | Facility: CLINIC | Age: 68
End: 2023-07-19
Payer: COMMERCIAL

## 2023-07-19 DIAGNOSIS — F41.1 GENERALIZED ANXIETY DISORDER: Primary | ICD-10-CM

## 2023-07-19 DIAGNOSIS — F39 MOOD DISORDER (H): ICD-10-CM

## 2023-07-19 PROCEDURE — 90834 PSYTX W PT 45 MINUTES: CPT | Mod: VID | Performed by: SOCIAL WORKER

## 2023-07-25 NOTE — PROGRESS NOTES
Surgical Office Location:  [facility]  [address] [place] [id number]

## 2023-07-27 ENCOUNTER — OFFICE VISIT (OUTPATIENT)
Dept: DERMATOLOGY | Facility: CLINIC | Age: 68
End: 2023-07-27
Payer: COMMERCIAL

## 2023-07-27 VITALS — SYSTOLIC BLOOD PRESSURE: 110 MMHG | DIASTOLIC BLOOD PRESSURE: 69 MMHG

## 2023-07-27 DIAGNOSIS — D04.71 SQUAMOUS CELL CARCINOMA IN SITU (SCCIS) OF SKIN OF RIGHT LOWER LEG: Primary | ICD-10-CM

## 2023-07-27 DIAGNOSIS — D04.5 SQUAMOUS CELL CARCINOMA IN SITU (SCCIS) OF SKIN OF CHEST: ICD-10-CM

## 2023-07-27 PROCEDURE — 17313 MOHS 1 STAGE T/A/L: CPT | Mod: 59 | Performed by: DERMATOLOGY

## 2023-07-27 PROCEDURE — 17313 MOHS 1 STAGE T/A/L: CPT | Performed by: DERMATOLOGY

## 2023-07-27 ASSESSMENT — PAIN SCALES - GENERAL: PAINLEVEL: NO PAIN (0)

## 2023-07-27 NOTE — LETTER
2023         RE: Jolynn Haji  [address] [place] [address]        Dear Colleague,    Thank you for referring your patient, Jolynn Haji, to the [facility]. Please see a copy of my visit note below.    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  Patient has no other skin complaints today.  Remainder of the HPI, Meds, PMH, Allergies, FH, and SH was reviewed in chart.      Past Medical History:   Diagnosis Date     Abnormal glandular Papanicolaou smear of cervix- ASCUS in 2003    [facility] H-      Basal cell carcinoma of leg, right 2016    Dr Rogers     Colon polyps 2018    tubular adenoam - due 5 yrs - 2 mm     Concussion without loss of consciousness 2017     Hidradenitis     left  groin     HSIL (high grade squamous intraepithelial lesion) on Pap smear of cervix 2000    Normal paps from  to /     Hypertension goal BP (blood pressure) < 140/90      OA (osteoarthritis) of knee     moderate     Postmenopausal since 2008      Shingles 2010    left      Squamous cell carcinoma of skin, unspecified      Synovial cyst of popliteal space        Past Surgical History:   Procedure Laterality Date     COLONOSCOPY  2018    polyps, tubular - 2 mm - due 5 yrs     ZZC NONSPECIFIC PROCEDURE      Colposcopy        Family History   Problem Relation Age of Onset     Thyroid Disease Mother         Graves disease     Hypertension Mother      Diabetes Mother         type 2     Blood Disease Mother          from leukemia at age 78     Diabetes Daughter         Juvenile Diabetes     Heart Disease Father 62        MV problem with CHF  at 62/ from MI     Hypertension Father      Diabetes Son          Juvenile Diabetes     Cancer Maternal Uncle         type ??     Cancer Maternal Uncle         liver       Social History     Socioeconomic History     Marital status:      Spouse name: Zach     Number of children: 2     Years of education: 16     Highest education level: Not on file   Occupational History     Occupation: teacher first grade -retired at age 60     Comment: [employer]     Employer: [employer]   Tobacco Use     Smoking status: Never     Smokeless tobacco: Never   Substance and Sexual Activity     Alcohol use: Yes     Alcohol/week: 0.0 - 1.0 standard drinks of alcohol     Comment: 2 per month     Drug use: No     Comment: no herbal meds either     Sexual activity: Yes     Partners: Male     Birth control/protection: Surgical     Comment: -was on DepoProvera since 2000-2/2008 -  had vasectomy   Other Topics Concern      Service No     Blood Transfusions No     Caffeine Concern No     Occupational Exposure No     Hobby Hazards No     Sleep Concern No     Stress Concern No     Weight Concern No     Special Diet No     Back Care No     Exercise Yes     Comment: regular      Bike Helmet No     Seat Belt Yes     Comment: always     Self-Exams Yes     Comment: SBE  encouraged monthly     Parent/sibling w/ CABG, MI or angioplasty before 65F 55M? No   Social History Narrative    [place] - [place] Spaniel - going to be 6 yrs old spring 2021---needs walks every day. --Helga Ibarra MD      January 13, 2021      Social Determinants of Health     Financial Resource Strain: Not on file   Food Insecurity: Not on file   Transportation Needs: Not on file   Physical Activity: Not on file   Stress: Not on file   Social Connections: Not on file   Intimate Partner Violence: Not on file   Housing Stability: Not on file       Outpatient Encounter Medications as of 7/27/2023   Medication Sig Dispense Refill     clindamycin (CLINDAGEL) 1 % external gel  Apply topically daily 60 g 11     lactobacillus rhamnosus, GG, (CULTURELL) capsule Take 1 capsule by mouth daily Taking every other day       lisinopril (ZESTRIL) 2.5 MG tablet Take 1 tablet (2.5 mg) by mouth daily 90 tablet 1     propranolol (INDERAL) 20 MG tablet Take 1 tablet (20 mg) by mouth 2 times daily 180 tablet 1     traZODone (DESYREL) 50 MG tablet Take 0.5-1 tablets (25-50 mg) by mouth nightly as needed for sleep 90 tablet 1     venlafaxine (EFFEXOR XR) 37.5 MG 24 hr capsule Take one cap by mouth WITH 75 mg cap for total daily dose 112.5 mg. 90 capsule 1     venlafaxine (EFFEXOR XR) 75 MG 24 hr capsule Take one cap by mouth WITH 37.5 mg cap for total daily dose 112.5 mg. 90 capsule 1     Facility-Administered Encounter Medications as of 7/27/2023   Medication Dose Route Frequency Provider Last Rate Last Admin     lidocaine 1 % 4 mL  4 mL INTRA-ARTICULAR Once Helga Ibarra MD         lidocaine 1 % injection 3 mL  3 mL   Giovany Quiroz MD   3 mL at 07/01/22 1120     lidocaine 1 % injection 4 mL  4 mL   Giovany Quirzo MD   4 mL at 07/01/22 1120     triamcinolone (KENALOG-40) injection 40 mg  40 mg INTRA-ARTICULAR Once Helga Ibarra MD                 O:   NAD, WDWN, Alert & Oriented, Mood & Affect wnl, Vitals stable   Here today alone   /69   LMP 06/28/2001    General appearance normal   Vitals stable   Alert, oriented and in no acute distress     R SHIN ill-defined 6mm scaly papule   Chest ill-defined 6mm scaly papule       Eyes: Conjunctivae/lids:Normal     ENT: Lips, buccal mucosa, tongue: normal    MSK:Normal    Cardiovascular: peripheral edema none    Pulm: Breathing Normal    Neuro/Psych: Orientation:Alert and Orientedx3 ; Mood/Affect:normal       A/P:  R shin squamous cell carcinoma in situ   MOHS:   Location and Ill-defined margins    The rationale for Mohs surgery was discussed with the patient and consent was obtained.  The risks and benefits as well as  alternatives to therapy were discussed, in detail.  Specifically, the risks of infection, scarring, bleeding, prolonged wound healing, incomplete removal, allergy to anesthesia, nerve injury and recurrence were addressed.  Indication for Mohs was Location and Ill-defined margins. Prior to the procedure, the treatment site was clearly identified and, if available, confirmed with previous photos and confirmed by the patient   All components of the Universal Protocol/PAUSE rule were completed.  The Mohs surgeon operated in two distinct and integrated capacities as the surgeon and pathologist.      The area was prepped with Betasept.  A rim of normal appearing skin was marked circumferentially around the lesion.  The area was infiltrated with local anesthesia.  The tumor was first debulked to remove all clinically apparent tumor.  An incision following the standard Mohs approach was done and the specimen was oriented,mapped and placed in 1 block(s).  Each specimen was then chromacoded and processed in the Mohs laboratory using standard Mohs technique and submitted for frozen section histology.  Frozen section analysis showed no residual tumor but CLEAR MARGINS.      The tumor was excised using standard Mohs technique in 1 stages(s).  CLEAR MARGINS OBTAINED and Final defect size was 1.3 cm.     We discussed the options for wound management in full with the patient including risks/benefits/ possible outcomes.    .  Mcsi  2. Chest squamous cell carcinoma in situ   MOHS:   Size and Ill-defined margins    The rationale for Mohs surgery was discussed with the patient and consent was obtained.  The risks and benefits as well as alternatives to therapy were discussed, in detail.  Specifically, the risks of infection, scarring, bleeding, prolonged wound healing, incomplete removal, allergy to anesthesia, nerve injury and recurrence were addressed.  Indication for Mohs was Size and Ill-defined margins. Prior to the procedure, the  treatment site was clearly identified and, if available, confirmed with previous photos and confirmed by the patient   All components of the Universal Protocol/PAUSE rule were completed.  The Mohs surgeon operated in two distinct and integrated capacities as the surgeon and pathologist.      The area was prepped with Betasept.  A rim of normal appearing skin was marked circumferentially around the lesion.  The area was infiltrated with local anesthesia.  The tumor was first debulked to remove all clinically apparent tumor.  An incision following the standard Mohs approach was done and the specimen was oriented,mapped and placed in 1 block(s).  Each specimen was then chromacoded and processed in the Mohs laboratory using standard Mohs technique and submitted for frozen section histology.  Frozen section analysis showed no residual tumor but CLEAR MARGINS.      The tumor was excised using standard Mohs technique in 1 stages(s).  CLEAR MARGINS OBTAINED and Final defect size was 1.1 cm.     We discussed the options for wound management in full with the patient including risks/benefits/ possible outcomes.      REPAIR SECOND INTENT: We discussed the options for wound management in full with the patient including risks/benefits/possible outcomes. Decision made to allow the wound to heal by second intention. Cautery was used for for hemostasis. EBL minimal; complications none; wound care routine.  The patient was discharged in good condition and will return in one month or prn for wound evaluation.    It was a pleasure speaking to Jolynn Haji today.  Previous clinic notes and pertinent laboratory tests were reviewed prior to Jolynn Haji's visit.  Signs and Symptoms of skin cancer discussed with patient.  Patient encouraged to perform monthly skin exams.  UV precautions reviewed with patient.  Risks of non-melanoma skin cancer discussed with patient   Return to clinic 6 months        Again, thank you for allowing me to  participate in the care of your patient.        Sincerely,        Sheldon Rogers MD

## 2023-07-27 NOTE — PATIENT INSTRUCTIONS
Open Wound Care     for ______________        No strenuous activity for 48 hours    Take Tylenol as needed for discomfort.                                                .         Do not drink alcoholic beverages for 48 hours.    Keep the pressure bandage in place for 24 hours. If the bandage becomes blood tinged or loose, reinforce it with gauze and tape.        (Refer to the reverse side of this page for management of bleeding).    Remove bandage in 24 hours and begin wound care as follows:     Clean area with tap water using a Q tip or gauze pad, (shower / bathe normally)  Dry wound with Q tip or gauze pad  Apply Aquaphor, Vaseline, Polysporin or Bacitracin Ointment with a Q tip  Do NOT use Neosporin Ointment *  Cover the wound with a band-aid or nonstick gauze pad and paper tape.  Repeat wound care once a day until wound is completely healed.    It is an old wives tale that a wound heals better when it is exposed to air and allowed to dry out. The wound will heal faster with a better cosmetic result if it is kept moist with ointment and covered with a bandage.  Do not let the wound dry out.      Supplies Needed:                Qtips or gauze pads                Polysporin or Bacitracin Ointment                Bandaids or nonstick gauze pads and paper tape    Wound care kits and brown paper tape are available for purchase at   the pharmacy.    BLEEDING:    Use tightly rolled up gauze or cloth to apply direct pressure over the bandage for 20   minutes.  Reapply pressure for an additional 20 minutes if necessary  Call the office or go to the nearest emergency room if pressure fails to stop the bleeding.  Use additional gauze and tape to maintain pressure once the bleeding has stopped.  Begin wound care 24 hours after surgery as directed.                  WOUND HEALING    One week after surgery a pink / red halo will form around the outside of the wound.   This is new skin.  The center of the wound will appear  yellowish white and produce some drainage.  The pink halo will slowly migrate in toward the center of the wound until the wound is covered with new shiny pink skin.  There will be no more drainage when the wound is completely healed.  It will take six months to one year for the redness to fade.  The scar may be itchy, tight and sensitive to extreme temperatures for a year after the surgery.  Massaging the area several times a day for several minutes after the wound is completely healed will help the scar soften and normalize faster. Begin massage only after healing is complete.      In case of emergency call: Dr Rogers: [phone]    [facility]: [phone]    [facility]:[phone]     Patient Education       Proper skin care from [facility]:    -Eliminate harsh soaps as they strip the natural oils from the skin, often resulting in dry itchy skin ( i.e. Dial, Zest, Icelandic Spring)  -Use mild soaps such as Cetaphil or Dove Sensitive Skin in the shower. You do not need to use soap on arms, legs, and trunk every time you shower unless visibly soiled.   -Avoid hot or cold showers.  -After showering, lightly dry off and apply moisturizing within 2-3 minutes. This will help trap moisture in the skin.   -Aggressive use of a moisturizer at least 1-2 times a day to the entire body (including -Vanicream, Cetaphil, Aquaphor or Cerave) and moisturize hands after every washing.  -We recommend using moisturizers that come in a tub that needs to be scooped out, not a pump. This has more of an oil base. It will hold moisture in your skin much better than a water base moisturizer. The above recommended are non-pore clogging.      Wear a sunscreen with at least SPF 30 on your face, ears, neck and V of the chest daily. Wear sunscreen on other areas of the body if those areas are exposed to the sun throughout the day. Sunscreens can contain physical and/or chemical blockers. Physical blockers are less  likely to clog pores, these include zinc oxide and titanium dioxide. Reapply every two hour and after swimming.     Sunscreen examples: https://www.ewg.org/sunscreen/    UV radiation  UVA radiation remains constant throughout the day and throughout the year. It is a longer wavelength than UVB and therefore penetrates deeper into the skin leading to immediate and delayed tanning, photoaging, and skin cancer. 70-80% of UVA and UVB radiation occurs between the hours of 10am-2pm.  UVB radiation  UVB radiation causes the most harmful effects and is more significant during the summer months. However, snow and ice can reflect UVB radiation leading to skin damage during the winter months as well. UVB radiation is responsible for tanning, burning, inflammation, delayed erythema (pinkness), pigmentation (brown spots), and skin cancer.     I recommend self monthly full body exams and yearly full body exams with a dermatology provider. If you develop a new or changing lesion please follow up for examination. Most skin cancers are pink and scaly or pink and pearly. However, we do see blue/brown/black skin cancers.  Consider the ABCDEs of melanoma when giving yourself your monthly full body exam ( don't forget the groin, buttocks, feet, toes, etc). A-asymmetry, B-borders, C-color, D-diameter, E-elevation or evolving. If you see any of these changes please follow up in clinic. If you cannot see your back I recommend purchasing a hand held mirror to use with a larger wall mirror.       Checking for Skin Cancer  You can find cancer early by checking your skin each month. There are 3 kinds of skin cancer. They are melanoma, basal cell carcinoma, and squamous cell carcinoma. Doing monthly skin checks is the best way to find new marks or skin changes. Follow the instructions below for checking your skin.   The ABCDEs of checking moles for melanoma   Check your moles or growths for signs of melanoma using ABCDE:   Asymmetry: the sides  of the mole or growth don t match  Border: the edges are ragged, notched, or blurred  Color: the color within the mole or growth varies  Diameter: the mole or growth is larger than 6 mm (size of a pencil eraser)  Evolving: the size, shape, or color of the mole or growth is changing (evolving is not shown in the images below)    Checking for other types of skin cancer  Basal cell carcinoma or squamous cell carcinoma have symptoms such as:     A spot or mole that looks different from all other marks on your skin  Changes in how an area feels, such as itching, tenderness, or pain  Changes in the skin's surface, such as oozing, bleeding, or scaliness  A sore that does not heal  New swelling or redness beyond the border of a mole    Who s at risk?  Anyone can get skin cancer. But you are at greater risk if you have:   Fair skin, light-colored hair, or light-colored eyes  Many moles or abnormal moles on your skin  A history of sunburns from sunlight or tanning beds  A family history of skin cancer  A history of exposure to radiation or chemicals  A weakened immune system  If you have had skin cancer in the past, you are at risk for recurring skin cancer.   How to check your skin  Do your monthly skin checkups in front of a full-length mirror. Check all parts of your body, including your:   Head (ears, face, neck, and scalp)  Torso (front, back, and sides)  Arms (tops, undersides, upper, and lower armpits)  Hands (palms, backs, and fingers, including under the nails)  Buttocks and genitals  Legs (front, back, and sides)  Feet (tops, soles, toes, including under the nails, and between toes)  If you have a lot of moles, take digital photos of them each month. Make sure to take photos both up close and from a distance. These can help you see if any moles change over time.   Most skin changes are not cancer. But if you see any changes in your skin, call your doctor right away. Only he or she can diagnose a problem. If you have  skin cancer, seeing your doctor can be the first step toward getting the treatment that could save your life.   [employer] last reviewed this educational content on 4/1/2019 2000-2020 [employer]. [address] [place] [address], [place] [address]. All rights reserved. This information is not intended as a substitute for professional medical care. Always follow your healthcare professional's instructions.       When should I call my doctor?  If you are worsening or not improving, please, contact us or seek urgent care as noted below.     Who should I call with questions (adults)?  [facility] (adult and pediatric): [phone]  [facility] (adult): [phone]  [facility] ([facility] and [place]) [phone]  For urgent needs outside of business hours call the [facility] at [phone] and ask for the dermatology resident on call to be paged  If this is a medical emergency and you are unable to reach an ER, Call [phone]      If you need a prescription refill, please contact your pharmacy. Refills are approved or denied by our Physicians during normal business hours, Monday through Fridays  Per office policy, refills will not be granted if you have not been seen within the past year (or sooner depending on your child's condition)

## 2023-07-27 NOTE — PROGRESS NOTES
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  Patient has no other skin complaints today.  Remainder of the HPI, Meds, PMH, Allergies, FH, and SH was reviewed in chart.      Past Medical History:   Diagnosis Date    Abnormal glandular Papanicolaou smear of cervix- ASCUS in 2003    [facility]-     Basal cell carcinoma of leg, right 2016    Dr Rogers    Colon polyps 2018    tubular adenoam - due 5 yrs - 2 mm    Concussion without loss of consciousness 2017    Hidradenitis     left  groin    HSIL (high grade squamous intraepithelial lesion) on Pap smear of cervix 2000    Normal paps from  to /    Hypertension goal BP (blood pressure) < 140/90     OA (osteoarthritis) of knee     moderate    Postmenopausal since 2008     Shingles 2010    left     Squamous cell carcinoma of skin, unspecified     Synovial cyst of popliteal space        Past Surgical History:   Procedure Laterality Date    COLONOSCOPY  2018    polyps, tubular - 2 mm - due 5 yrs    ZZC NONSPECIFIC PROCEDURE      Colposcopy        Family History   Problem Relation Age of Onset    Thyroid Disease Mother         Graves disease    Hypertension Mother     Diabetes Mother         type 2    Blood Disease Mother          from leukemia at age 78    Diabetes Daughter         Juvenile Diabetes    Heart Disease Father 62        MV problem with CHF  at 62/ from MI    Hypertension Father     Diabetes Son         Juvenile Diabetes    Cancer Maternal Uncle         type ??    Cancer Maternal Uncle         liver       Social History     Socioeconomic History    Marital status:      Spouse name: Zach    Number of children: 2    Years of education: 16    Highest education level: Not on file   Occupational History    Occupation: teacher first grade -retired at age 60     Comment:  [employer]     Employer: [employer]   Tobacco Use    Smoking status: Never    Smokeless tobacco: Never   Substance and Sexual Activity    Alcohol use: Yes     Alcohol/week: 0.0 - 1.0 standard drinks of alcohol     Comment: 2 per month    Drug use: No     Comment: no herbal meds either    Sexual activity: Yes     Partners: Male     Birth control/protection: Surgical     Comment: -was on DepoProvera since 2000-2/2008 -  had vasectomy   Other Topics Concern     Service No    Blood Transfusions No    Caffeine Concern No    Occupational Exposure No    Hobby Hazards No    Sleep Concern No    Stress Concern No    Weight Concern No    Special Diet No    Back Care No    Exercise Yes     Comment: regular     Bike Helmet No    Seat Belt Yes     Comment: always    Self-Exams Yes     Comment: SBE  encouraged monthly    Parent/sibling w/ CABG, MI or angioplasty before 65F 55M? No   Social History Narrative    Casper Bryson - going to be 6 yrs old spring 2021---needs walks every day. --Helga Ibarra MD      January 13, 2021      Social Determinants of Health     Financial Resource Strain: Not on file   Food Insecurity: Not on file   Transportation Needs: Not on file   Physical Activity: Not on file   Stress: Not on file   Social Connections: Not on file   Intimate Partner Violence: Not on file   Housing Stability: Not on file       Outpatient Encounter Medications as of 7/27/2023   Medication Sig Dispense Refill    clindamycin (CLINDAGEL) 1 % external gel Apply topically daily 60 g 11    lactobacillus rhamnosus, GG, (CULTURELL) capsule Take 1 capsule by mouth daily Taking every other day      lisinopril (ZESTRIL) 2.5 MG tablet Take 1 tablet (2.5 mg) by mouth daily 90 tablet 1    propranolol (INDERAL) 20 MG tablet Take 1 tablet (20 mg) by mouth 2 times daily 180 tablet 1    traZODone (DESYREL) 50 MG tablet Take 0.5-1 tablets (25-50 mg) by mouth nightly  as needed for sleep 90 tablet 1    venlafaxine (EFFEXOR XR) 37.5 MG 24 hr capsule Take one cap by mouth WITH 75 mg cap for total daily dose 112.5 mg. 90 capsule 1    venlafaxine (EFFEXOR XR) 75 MG 24 hr capsule Take one cap by mouth WITH 37.5 mg cap for total daily dose 112.5 mg. 90 capsule 1     Facility-Administered Encounter Medications as of 7/27/2023   Medication Dose Route Frequency Provider Last Rate Last Admin    lidocaine 1 % 4 mL  4 mL INTRA-ARTICULAR Once Helga Ibarra MD        lidocaine 1 % injection 3 mL  3 mL   Giovany Quiroz MD   3 mL at 07/01/22 1120    lidocaine 1 % injection 4 mL  4 mL   Giovany Quiroz MD   4 mL at 07/01/22 1120    triamcinolone (KENALOG-40) injection 40 mg  40 mg INTRA-ARTICULAR Once Helga Ibarra MD                 O:   NAD, WDWN, Alert & Oriented, Mood & Affect wnl, Vitals stable   Here today alone   /68   LMP 06/28/2001    General appearance normal   Vitals stable   Alert, oriented and in no acute distress     R SHIN ill-defined 6mm scaly papule   Chest ill-defined 6mm scaly papule       Eyes: Conjunctivae/lids:Normal     ENT: Lips, buccal mucosa, tongue: normal    MSK:Normal    Cardiovascular: peripheral edema none    Pulm: Breathing Normal    Neuro/Psych: Orientation:Alert and Orientedx3 ; Mood/Affect:normal       A/P:  R shin squamous cell carcinoma in situ   MOHS:   Location and Ill-defined margins    The rationale for Mohs surgery was discussed with the patient and consent was obtained.  The risks and benefits as well as alternatives to therapy were discussed, in detail.  Specifically, the risks of infection, scarring, bleeding, prolonged wound healing, incomplete removal, allergy to anesthesia, nerve injury and recurrence were addressed.  Indication for Mohs was Location and Ill-defined margins. Prior to the procedure, the treatment site was clearly identified and, if available, confirmed with previous photos and confirmed by the  patient   All components of the Universal Protocol/PAUSE rule were completed.  The Mohs surgeon operated in two distinct and integrated capacities as the surgeon and pathologist.      The area was prepped with Betasept.  A rim of normal appearing skin was marked circumferentially around the lesion.  The area was infiltrated with local anesthesia.  The tumor was first debulked to remove all clinically apparent tumor.  An incision following the standard Mohs approach was done and the specimen was oriented,mapped and placed in 1 block(s).  Each specimen was then chromacoded and processed in the Mohs laboratory using standard Mohs technique and submitted for frozen section histology.  Frozen section analysis showed no residual tumor but CLEAR MARGINS.      The tumor was excised using standard Mohs technique in 1 stages(s).  CLEAR MARGINS OBTAINED and Final defect size was 1.3 cm.     We discussed the options for wound management in full with the patient including risks/benefits/ possible outcomes.    .  Mcsi  2. Chest squamous cell carcinoma in situ   MOHS:   Size and Ill-defined margins    The rationale for Mohs surgery was discussed with the patient and consent was obtained.  The risks and benefits as well as alternatives to therapy were discussed, in detail.  Specifically, the risks of infection, scarring, bleeding, prolonged wound healing, incomplete removal, allergy to anesthesia, nerve injury and recurrence were addressed.  Indication for Mohs was Size and Ill-defined margins. Prior to the procedure, the treatment site was clearly identified and, if available, confirmed with previous photos and confirmed by the patient   All components of the Universal Protocol/PAUSE rule were completed.  The Mohs surgeon operated in two distinct and integrated capacities as the surgeon and pathologist.      The area was prepped with Betasept.  A rim of normal appearing skin was marked circumferentially around the lesion.  The  area was infiltrated with local anesthesia.  The tumor was first debulked to remove all clinically apparent tumor.  An incision following the standard Mohs approach was done and the specimen was oriented,mapped and placed in 1 block(s).  Each specimen was then chromacoded and processed in the Mohs laboratory using standard Mohs technique and submitted for frozen section histology.  Frozen section analysis showed no residual tumor but CLEAR MARGINS.      The tumor was excised using standard Mohs technique in 1 stages(s).  CLEAR MARGINS OBTAINED and Final defect size was 1.1 cm.     We discussed the options for wound management in full with the patient including risks/benefits/ possible outcomes.      REPAIR SECOND INTENT: We discussed the options for wound management in full with the patient including risks/benefits/possible outcomes. Decision made to allow the wound to heal by second intention. Cautery was used for for hemostasis. EBL minimal; complications none; wound care routine.  The patient was discharged in good condition and will return in one month or prn for wound evaluation.    It was a pleasure speaking to Jolynn Haji today.  Previous clinic notes and pertinent laboratory tests were reviewed prior to Jolynn Haji's visit.  Signs and Symptoms of skin cancer discussed with patient.  Patient encouraged to perform monthly skin exams.  UV precautions reviewed with patient.  Risks of non-melanoma skin cancer discussed with patient   Return to clinic 6 months

## 2023-07-31 NOTE — PROGRESS NOTES
[facility]  August 2, 2023      Behavioral Health Clinician Progress Note    Patient Name: Jolynn Haji           Service Type:  Individual      Service Location:   MyChart / Email (patient reached)     Session Start Time: 3:00 pm   Session End Time: 3:40 pm      Session Length: 38 - 52      Attendees: Patient     Service Modality:  Video Visit:      Provider verified identity through the following two step process.  Patient provided:  Patient is known previously to provider    Telemedicine Visit: The patient's condition can be safely assessed and treated via synchronous audio and visual telemedicine encounter.      Reason for Telemedicine Visit: Patient has requested telehealth visit    Originating Site (Patient Location): Patient's home    Distant Site (Provider Location): [facility]    Consent:  The patient/guardian has verbally consented to: the potential risks and benefits of telemedicine (video visit) versus in person care; bill my insurance or make self-payment for services provided; and responsibility for payment of non-covered services.     Patient would like the video invitation sent by:  My Chart    Mode of Communication:  Video Conference via [employer]well    As the provider I attest to compliance with applicable laws and regulations related to telemedicine.    Visit Activities (Refresh list every visit): [facility] Only    Diagnostic Assessment Date: 9/27/22  Treatment Plan Review Date: 11/2/23  See Flowsheets for today's PHQ-9 and TIFFANY-7 results  Previous PHQ-9:       6/12/2023     8:47 PM 7/11/2023     7:37 PM 7/18/2023    11:30 AM   PHQ-9 SCORE   PHQ-9 Total Score MyChart 1 (Minimal depression) 1 (Minimal depression) 1 (Minimal depression)   PHQ-9 Total Score 1 1 1     Previous TIFFANY-7:       5/3/2023     1:18 PM 7/10/2023    10:54 AM 7/11/2023     7:38 PM   TIFFANY-7 SCORE   Total Score 3 (minimal anxiety) 2 (minimal anxiety) 1  (minimal anxiety)   Total Score 3 2 1       KARINA LEVEL:      8/27/2010     3:00 PM 2/22/2011    11:00 AM   KARINA Score (Last Two)   KARINA Raw Score 51 49   Activation Score 91.6 82.8   KARINA Level 4 4       DATA  Extended Session (60+ minutes): No  Interactive Complexity: No  Crisis: No  [facility] Patient: No    Treatment Objective(s) Addressed in This Session:  Target Behavior(s):  anxiety and depression    Depressed Mood: Increase interest, engagement, and pleasure in doing things  Decrease frequency and intensity of feeling down, depressed, hopeless  Improve quantity and quality of night time sleep / decrease daytime naps  Feel less tired and more energy during the day   Improve diet, appetite, mindful eating, and / or meal planning  Identify negative self-talk and behaviors: challenge core beliefs, myths, and actions  Improve concentration, focus, and mindfulness in daily activities   Feel less fidgety, restless or slow in daily activities / interpersonal interactions  Anxiety: will experience a reduction in anxiety, will develop more effective coping skills to manage anxiety symptoms, will develop healthy cognitive patterns and beliefs and will increase ability to function adaptively    Current Stressors / Issues:  Reporting spending time with grand children which is always refreshing to her. She has been volunteering for a summer school program. Reporting having a larger groups adults causes her anxiety/headaches. Discussed saying no as part of self care. She has been extending self more to engage in things she likes but has been noting more tiredness and anxiety.     7/19/23  Reports doing well with recent med changes as a result of continued headaches. She has not noticed any changes in anxiety sx. Continues to sleep well. Discussed use of skills for anxiety.     7/10/23  Reports she had a really good time with family over the 4th of July holiday. Reports anxiety has been good. Reporting some heart palpitations and  "shallow of breath [place] Discussed utilizing family more to decrease some of her anxiety. She is not doing everything anymore and becoming more comfortable with asking for help. Sleep continues to improve, 7-8hrs. Denies feeling groggy or having a \"hang overs\" in the afternoon. Reduced headaches, tends to come when anxiety is higher. Feels headaches are much more manageable.     6/27/23  Reports several nights of 8-9hs a night. Feels more clear headed, better memory, and more energized. Discussed times of higher anxiety or racing thoughts in public settings. Discussed ways of lowering anxiety in those events.      6/13/23  Reporting being more busy with jobs [place] and [place] has helped with her anxiety. Went to graduation party over the weekend with many people. This went well with conversation. Did have some conversations that were uncomfortable with individuals who expressed concern over how she was a year ago. Discussed role with friend who is suffering with MH. Processed advantages and disadvantages of involving self further in her health care decisions.     5/31/23  Reported she had a [place]. Reporting they have followed up with husbands pancreas. MDs have recommended removing it completely. They have to make many decisions with his health. Anxiety has been high as a result. They have time to make decisions but all options are not ideal. Discussed maintaining healthy skills for dealing with anxiety.       5/17/23  Went to see friend with high anxiety. She said conversation was good and she was able to help with relating to her. Discussed challenging self to drive further distances. This has been going really well for her. Discussed husbands doctors appt and decision to have surgery or not.      5/11/23 ([facility])  MH update: Reports feeling more clear headed on trazodone and off Ambien. She is taking half the pill at " night. Headaches have been less and less sever. No change in sleep. She has been more active in doing things she would have done over a year ago. Not stressing about the cabin remodel. She has been spending time with grandchildren. Notes family has really noticed difference in her mental health. Reports reading recommended books by [facility] [place]. Going to see friend struggling with mental health.   Stresses: [place] cabin  Appetite: unremarkable  Sleep: unremarkable  Therapy: weekly therapy  FARIDA: na  Preg: na  Most important: Does it matter if she alters the dose of trazodone from day to day.     5/1/23  Reports they drove to the [employer] this weekend. Feels things are progressing well. Discussed switching medication for sleep to Trazodone. She has been sleeping the same on Ambien. She is taking a half pill and not having any grogginess. She has been engaged with friends and Protestant. Recommended Gifts of Imperfections and I Thought It Was Just Me by Darrick Arteaga. Also Decluttering Your Mind by CAT Melgar. Discussed having others notice positive changes in her. She struggles with having that spot light on her.       4/27/23 ([place] [facility])   update: doing well with heightened anxiety but not panic attacks.   Stresses: lake house renovations   Appetite: unremarkable  Sleep: unremarkable  Therapy: seeing therapist weekly, reports sister came for several days that was great. Making decision about traveling for family wedding.   FRAIDA: na  Preg: na  Most important: continues to have headaches daily. Wants to discuss Ambien with MD/PCP suggested getting off completely. Would like to have Propanol for 10 mg to not have to cut.      4/19/23  Reports managing her anxiety around renovation of [employer] bathroom has been difficult. Discussed desire for control and letting go.Reports sister is coming for several days.  is having surgery tomorrow. She has been walking and exercising more.      4/12/23  Reports it she is doing well and had a  "great Easter with family. Discussed more about her anxiety (ABC), being self blaming (what ifs/should have) at nature but at other times unknown source. Discussed eliminating \"should' for vocabulary due to shame, self judgement, and regret associated with the word. She will continue to work on ABC and look at the use of should.     3/31/23  Reports she is doing well. Anxiety has been pretty good but has headaches when anxious. Looking forward to going to theater with grandchildren. Medications have been very helpful. Discussed ABC of Anxiety. Discussed events of anxiety at its worse. Discussed the beliefs she has about anxiety events. Discussed consequences of event.     3/23/23 [facility]  MH update: Reports anxiety has decreased a lot on the propanol. Continues to have headaches but only about half the days. She has noticed a stronger correlation with headaches and anxiety. She completed Neuro Psych testing. Relieved she has no new dx. Planning on walking with dog. She is feeling more like her self, \"5 star days.\"    Stresses: Neuro psych testing and going to wake alone was stressful. Felt she managed very well.   Appetite: unremarkable  Sleep: unremarkable  Therapy: Continues to do weekly therapy. [place] she worried a lot about testing and did not , lesson in not worrying about things that have not happened yet. Feeling very accomplished after attending a wake alone without her . She stated it went very well and anxiety was lower than normal and then expected. Discussed decision making in the moment. Discussed it can be difficult to make decisions in the moment. Continues with coping skills, ie meditation and breathing tch.   FARIDA: na  Preg: na  Most important: Wants to address doses of medications.     3/14/23  Feels the propanol has been really helping with anxiety. She has been feeling like her self for the past 5 days. Continues to have several headaches. She is considering going back to yoga after looking " "as some neck stretches.     3/9/23 ([facility])  She has been more anxious on the different dosing of medications. She has also not noticed decrease in headaches. Discussed neck tension and using body scanning. She continues to feel a bit foggy in the afternoons.     3/3/23  Reports doing well. States she has been in communication with MD about headaches. Has noticed on days when she has lower dose of med she has more anxiety. She has been examining her anxiety when going out socially. Does not feel it is associated with the socializing itself but rather \"will the anxiety come up.\" Discussed upcoming appt for neuro psych testing. Is hesitant but willing to do testing. May want to talk to MD about it prior appt. Fearful about Drug Induced Parkinson's.       2/17/23  Pt reports she is doing well. Reports anxiety has been better. Able to control it better with accepting and reognition of anxiety. Continues to utilize meditation and review of skills taught in therapy. Ice packs has been helping with headaches. Self talk has much improved and has gained more confidence. Anxiety in the evening have improved but still feeling the anxiety at times. She has been wanting to work exercise into her daily routine. Sleep has been also improve. ID values: umm, family, self respect responsibility and kindness.       2/10/23  Pt reports she had such a good time with her trip to her sisters house. Much reduced anxious than expected (compared to home) and sleep was really good. Discussed implementing things that worked at sisters for home. Continues to have some anxiety when going into store or before events. Reports panic attacks are less than previous week. Discussed triggers for anxiety. Discussed do expectations of self meet values.  ID values from next time and look at expectations for self. Pt asked \"What could I do better to be good enough\". Struggles with being good enough for self. Talked about list of things she wants to achieve " "in order to feel \"good enough\". One being able to go to places with out wondering if she will be anxious.     Identified Triggers for anxiety and background thought behind it:  More anxious when tired: less bandwidth to utilize skills  Feeling going to be late  Making quick decisions  Technology   organizing big meal for company: what people expect of her  MD visits or waiting for visit: anticipations of events at MD, has not had poor experiences, husbands medical experience   What if something happens to Zach   Packing groceries and people are behind her  Sometimes going out to eat (much better): menu with too many options and having to make decisions  Holiday: anticipation and wanting it to be perfect for people/what ng people expect of her  Dealing with insurance problem: hassle and being out of her control   Being in the unknown about things: Slowness of recovery from anxiety, what ifs  Do people see I am anxious  Large crowd of people: leading up to and during (highest), people might be noticing her, dont want to stand   *Things that need to be done but are not done(floor needing to be washed, getting decorations down etc): likes list to check off (what to accomplish that week), Not accomplishing goal. Self placed expectations (high standards from growing up), perfectionism (mom perfectionism as a child), some self comparisons to others, taking on too much during a week, and expecting to be like old self all the time (compare self to old self).     2/1/23  Pt is reporting she is doing well. She has been out driving to do shopping and seeing friends. Continues to have improvement with anxiety each day. Continues to have headaches but is wanting to change medications until she gets back from trip to her sisters home. Having some anxiety about spending over night for the first time since COVID. Discussed steps to addressing anxiety at her sisters home and drive up. Reports she had a panic attack while renewing " pass ports at the post office. Became dizzy and become worried about the physical sx she was experiencing. Anxiety grew from that point on. Discussed how she handled it in the moment with redirecting thoughts, accepting anxiety and attempting some of the anxiety tech discussed. Pt will explore other situations in which anxiety tends to arise for next session. Pt has been keeping a journal of her daily experience and will reexamine journal to better understand anxiety. Pt listed several situations she knows brings anxiety.      Identified Triggers:  Feeling rushed  Not wanting to be late  Having others do responsibilities she once carried as her responsibility  Being flexible  Expectations of others  What do others see/think of her when experiencing panic attack    1/23/23  Reports she is doing really well. Reports she will be driving up to see her grandson. She is highly looking forward to this and making Frequent Browser cards. Some anxiety with going in to the grocery store but not once she gets started shopping. Reports her ultimate goal would be to go to the mall alone with traffic as that has been causing anxiety. Headaches continue to be of concern to her. Discussed further and identified them coming following either a panic attack or after high anxiety. Discussed using sensory tech for elevating the anxiety and also for following headache such as heating pad for neck or eyes. Discussed her conversation with Dr. Pompa about possibly changing meds to help with headaches. She reports relishing in current progress and is not wanting meds to change at this time. Headaches do interfere sometimes with daily life. She will be going to lunch with several college friends. This has caused a bit of anxiety in accepting the invite. Normalized experience for pt.       Progress on Treatment Objective(s) / Homework:  Satisfactory progress - ACTION (Actively working towards change); Intervened by reinforcing change plan / affirming  steps taken    Motivational Interviewing    MI Intervention: Expressed Empathy/Understanding, Supported Autonomy, Collaboration, Evocation, Open-ended questions, Change talk (evoked) and Reframe     Change Talk Expressed by the Patient: Desire to change Ability to change Reasons to change Need to change    Provider Response to Change Talk: E - Evoked more info from patient about behavior change, A - Affirmed patient's thoughts, decisions, or attempts at behavior change, R - Reflected patient's change talk and S - Summarized patient's change talk statements      Care Plan review completed: Yes    Medication Review:  No changes to current psychiatric medication(s)    Medication Compliance:  Yes    Changes in Health Issues:   None reported    Chemical Use Review:   Substance Use: Chemical use reviewed, no active concerns identified      Tobacco Use: No current tobacco use.      Assessment: Current Emotional / Mental Status (status of significant symptoms):  Risk status (Self / Other harm or suicidal ideation)  Patient denies a history of suicidal ideation, suicide attempts, self-injurious behavior, homicidal ideation, homicidal behavior and and other safety concerns  Patient denies current fears or concerns for personal safety.  Patient denies current or recent suicidal ideation or behaviors.  Patient denies current or recent homicidal ideation or behaviors.  Patient denies current or recent self injurious behavior or ideation.  Patient denies other safety concerns.  A safety and risk management plan has not been developed at this time, however patient was encouraged to call [place] [phone] should there be a change in any of these risk factors.    Appearance:   Appropriate   Eye Contact:   Good   Psychomotor Behavior: Normal   Attitude:   Cooperative   Orientation:   All  Speech   Rate / Production: Normal    Volume:  Normal   Mood:    Anxious   Affect:    Appropriate   Thought Content:  Clear   Thought  Form:  Coherent  Logical   Insight:    Good     Diagnoses:  1. Generalized anxiety disorder    2. Mood disorder (H)    3. Insomnia, unspecified type        Collateral Reports Completed:  Not Applicable    Plan: (Homework, other):  Patient was given information about behavioral services and encouraged to schedule a follow up appointment with the clinic [facility] in 1 week.  She was also given information about mental health symptoms and treatment options .  CD Recommendations: No indications of CD issues.   Erika Francisco MSW LICSW      ______________________________________________________________________    Integrated Primary Care Behavioral Health Treatment Plan    Patient's Name: Jolynn Haji  YOB: 1955    Date of Creation: 10/5/22  Date Treatment Plan Last Reviewed/Revised: 8/2/23    DSM5 Diagnoses: 300.02 (F41.1) Generalized Anxiety Disorder  Psychosocial / Contextual Factors: Individual Factors high anxiety associated with depressive sx, interfereing with ability to function as she would like.  .  PROMIS (reviewed every 90 days):     Referral / Collaboration:  Was/were discussed and patient will pursue. - individual therapy    Anticipated number of session for this episode of care: 5-8 sessions  Anticipation frequency of session: Weekly  Anticipated Duration of each session: 38-52 minutes  Treatment plan will be reviewed in 90 days or when goals have been changed.       MeasurableTreatment Goal(s) related to diagnosis / functional impairment(s)  Goal 1: Patient will work with provider to manage symptoms    I will know I've met my goal when when less anxious and feeling down.      Objective #A (Patient Action)    Patient will   attend all sessions .  Status: Continued - Date(s): 8/2/23    Intervention(s)  Therapist will teach  educate patient on treatment options, clarify concerns, work with pt to overcome any resistance to compliance. .    Objective #B  Patient will identify 3 fears / thoughts that  contribute to feeling anxious.  Status: Continued - Date(s): 8/2/23    Intervention(s)  Therapist will  educate patient on treatment options, clarify concerns, work with pt to overcome any resistance to compliance. .    Objective #C  Patient will identify 3 initial signs or symptoms of anxiety.  Status: Continued - Date(s): 8/2/23    Intervention(s)  Therapist will  educate patient on treatment options, clarify concerns, work with pt to overcome any resistance to compliance. .        Patient has reviewed and agreed to the above plan.      Erika Francisco [facility]  August 2, 2023  Answers for HPI/ROS submitted by the patient on 12/6/2022  TIFFANY 7 TOTAL SCORE: 3  Answers for HPI/ROS submitted by the patient on 3/8/2023  If you checked off any problems, how difficult have these problems made it for you to do your work, take care of things at home, or get along with other people?: Not difficult at all  PHQ9 TOTAL SCORE: 5  Answers for HPI/ROS submitted by the patient on 4/26/2023  If you checked off any problems, how difficult have these problems made it for you to do your work, take care of things at home, or get along with other people?: Somewhat difficult  PHQ9 TOTAL SCORE: 1  Answers for HPI/ROS submitted by the patient on 6/12/2023  If you checked off any problems, how difficult have these problems made it for you to do your work, take care of things at home, or get along with other people?: Not difficult at all  PHQ9 TOTAL SCORE: 1  Answers for HPI/ROS submitted by the patient on 7/18/2023  If you checked off any problems, how difficult have these problems made it for you to do your work, take care of things at home, or get along with other people?: Not difficult at all  PHQ9 TOTAL SCORE: 1

## 2023-08-02 ENCOUNTER — VIRTUAL VISIT (OUTPATIENT)
Dept: BEHAVIORAL HEALTH | Facility: CLINIC | Age: 68
End: 2023-08-02
Payer: COMMERCIAL

## 2023-08-02 DIAGNOSIS — G47.00 INSOMNIA, UNSPECIFIED TYPE: ICD-10-CM

## 2023-08-02 DIAGNOSIS — F39 MOOD DISORDER (H): ICD-10-CM

## 2023-08-02 DIAGNOSIS — F41.1 GENERALIZED ANXIETY DISORDER: Primary | ICD-10-CM

## 2023-08-02 PROCEDURE — 90834 PSYTX W PT 45 MINUTES: CPT | Mod: VID | Performed by: SOCIAL WORKER

## 2023-08-09 RX ORDER — BISACODYL 5 MG/1
TABLET, DELAYED RELEASE ORAL
Qty: 4 TABLET | Refills: 0 | Status: SHIPPED | OUTPATIENT
Start: 2023-08-09 | End: 2023-09-27

## 2023-08-14 NOTE — PROGRESS NOTES
[facility] Integrated Behavioral Health  August 16, 2023      Behavioral Health Clinician Progress Note    Patient Name: Jolynn Haji           Service Type:  Individual      Service Location:   MyChart / Email (patient reached)     Session Start Time: 10:30 am   Session End Time: 11:16 am      Session Length: 38 - 52      Attendees: Patient     Service Modality:  Video Visit:      Provider verified identity through the following two step process.  Patient provided:  Patient is known previously to provider    Telemedicine Visit: The patient's condition can be safely assessed and treated via synchronous audio and visual telemedicine encounter.      Reason for Telemedicine Visit: Patient has requested telehealth visit    Originating Site (Patient Location): Patient's home    Distant Site (Provider Location): [facility]    Consent:  The patient/guardian has verbally consented to: the potential risks and benefits of telemedicine (video visit) versus in person care; bill my insurance or make self-payment for services provided; and responsibility for payment of non-covered services.     Patient would like the video invitation sent by:  My Chart    Mode of Communication:  Video Conference via [employer]well    As the provider I attest to compliance with applicable laws and regulations related to telemedicine.    Visit Activities (Refresh list every visit): [facility] [place] Only    Diagnostic Assessment Date: 9/27/22  Treatment Plan Review Date: 11/2/23  See Flowsheets for today's PHQ-9 and TIFFANY-7 results  Previous PHQ-9:       6/12/2023     8:47 PM 7/11/2023     7:37 PM 7/18/2023    11:30 AM   PHQ-9 SCORE   PHQ-9 Total Score MyChart 1 (Minimal depression) 1 (Minimal depression) 1 (Minimal depression)   PHQ-9 Total Score 1 1 1     Previous TIFFANY-7:       5/3/2023     1:18 PM 7/10/2023    10:54 AM 7/11/2023     7:38 PM   TIFFANY-7 SCORE   Total Score 3 (minimal anxiety) 2 (minimal anxiety)  1 (minimal anxiety)   Total Score 3 2 1       KARINA LEVEL:      8/27/2010     3:00 PM 2/22/2011    11:00 AM   KARINA Score (Last Two)   KARINA Raw Score 51 49   Activation Score 91.6 82.8   KARINA Level 4 4       DATA  Extended Session (60+ minutes): No  Interactive Complexity: No  Crisis: No  [facility] Patient: No    Treatment Objective(s) Addressed in This Session:  Target Behavior(s):  anxiety and depression    Depressed Mood: Increase interest, engagement, and pleasure in doing things  Decrease frequency and intensity of feeling down, depressed, hopeless  Improve quantity and quality of night time sleep / decrease daytime naps  Feel less tired and more energy during the day   Improve diet, appetite, mindful eating, and / or meal planning  Identify negative self-talk and behaviors: challenge core beliefs, myths, and actions  Improve concentration, focus, and mindfulness in daily activities   Feel less fidgety, restless or slow in daily activities / interpersonal interactions  Anxiety: will experience a reduction in anxiety, will develop more effective coping skills to manage anxiety symptoms, will develop healthy cognitive patterns and beliefs and will increase ability to function adaptively    Current Stressors / Issues:  Reports weaning off some of her anxiety medications in attempts to reduce headaches. Has been not been noticing much of a change in her anxiety. She has been working on increasing old activities she had been doing over a year ago prior experiencing high anxiety. Reports one panic attack related to making many decisions and a hectic situation. Attempted to do breathing tech to self sooth. Reports rapid heart rate lasted almost the rest of day despite use of multiple techs. Discussed nature of anxiety and educated on how panic attacks effect on overall system with in the body. Discussed being easy on self and avoiding self  of progress with anxiety.     8/2/23  Reporting spending time with grand children  "which is always refreshing to her. She has been volunteering for a summer school program. Reporting having a larger groups adults causes her anxiety/headaches. Discussed saying no as part of self care. She has been extending self more to engage in things she likes but has been noting more tiredness and anxiety.     7/19/23  Reports doing well with recent med changes as a result of continued headaches. She has not noticed any changes in anxiety sx. Continues to sleep well. Discussed use of skills for anxiety.     7/10/23  Reports she had a really good time with family over the 4th of July holiday. Reports anxiety has been good. Reporting some heart palpitations and shallow of breath [place] Discussed utilizing family more to decrease some of her anxiety. She is not doing everything anymore and becoming more comfortable with asking for help. Sleep continues to improve, 7-8hrs. Denies feeling groggy or having a \"hang overs\" in the afternoon. Reduced headaches, tends to come when anxiety is higher. Feels headaches are much more manageable.     6/27/23  Reports several nights of 8-9hs a night. Feels more clear headed, better memory, and more energized. Discussed times of higher anxiety or racing thoughts in public settings. Discussed ways of lowering anxiety in those events.      6/13/23  Reporting being more busy with jobs [place] and [place] has helped with her anxiety. Went to graduation party over the weekend with many people. This went well with conversation. Did have some conversations that were uncomfortable with individuals who expressed concern over how she was a year ago. Discussed role with friend who is suffering with MH. Processed advantages and disadvantages of involving self further in her health care decisions.     5/31/23  Reported she had a [place]. Reporting they have followed up with husbands pancreas. MDs " have recommended removing it completely. They have to make many decisions with his health. Anxiety has been high as a result. They have time to make decisions but all options are not ideal. Discussed maintaining healthy skills for dealing with anxiety.       5/17/23  Went to see friend with high anxiety. She said conversation was good and she was able to help with relating to her. Discussed challenging self to drive further distances. This has been going really well for her. Discussed husbands doctors appt and decision to have surgery or not.      5/11/23 ([facility])  MH update: Reports feeling more clear headed on trazodone and off Ambien. She is taking half the pill at night. Headaches have been less and less sever. No change in sleep. She has been more active in doing things she would have done over a year ago. Not stressing about the cabin remodel. She has been spending time with grandchildren. Notes family has really noticed difference in her mental health. Reports reading recommended books by [facility]. Going to see friend struggling with mental health.   Stresses: [place]  Appetite: unremarkable  Sleep: unremarkable  Therapy: weekly therapy  FARIDA: na  Preg: na  Most important: Does it matter if she alters the dose of trazodone from day to day.     5/1/23  Reports they drove to the [employer] this weekend. Feels things are progressing well. Discussed switching medication for sleep to Trazodone. She has been sleeping the same on Ambien. She is taking a half pill and not having any grogginess. She has been engaged with friends and Voodoo. Recommended Gifts of Imperfections and I Thought It Was Just Me by Darrick Arteaga. Also Decluttering Your Mind by CAT Melgar. Discussed having others notice positive changes in her. She struggles with having that spot light on her.       4/27/23 ([facility])  MH update: doing well with heightened anxiety but not panic attacks.   Stresses: [employer] renovations   Appetite: unremarkable  Sleep:  "unremarkable  Therapy: seeing therapist weekly, reports sister came for several days that was great. Making decision about traveling for family wedding.   FARIDA: na  Preg: na  Most important: continues to have headaches daily. Wants to discuss Ambien with MD/PCP suggested getting off completely. Would like to have Propanol for 10 mg to not have to cut.      4/19/23  Reports managing her anxiety around [place] has been difficult. Discussed desire for control and letting go.Reports sister is coming for several days.  is having surgery tomorrow. She has been walking and exercising more.      4/12/23  Reports it she is doing well and had a great Easter with family. Discussed more about her anxiety (ABC), being self blaming (what ifs/should have) at nature but at other times unknown source. Discussed eliminating \"should' for vocabulary due to shame, self judgement, and regret associated with the word. She will continue to work on ABC and look at the use of should.     3/31/23  Reports she is doing well. Anxiety has been pretty good but has headaches when anxious. Looking forward to going to theater with grandchildren. Medications have been very helpful. Discussed ABC of Anxiety. Discussed events of anxiety at its worse. Discussed the beliefs she has about anxiety events. Discussed consequences of event.     3/23/23 [facility]  MH update: Reports anxiety has decreased a lot on the propanol. Continues to have headaches but only about half the days. She has noticed a stronger correlation with headaches and anxiety. She completed Neuro Psych testing. Relieved she has no new dx. Planning on walking with dog. She is feeling more like her self, \"5 star days.\"    Stresses: Neuro psych testing and going to wake alone was stressful. Felt she managed very well.   Appetite: unremarkable  Sleep: unremarkable  Therapy: Continues to do weekly therapy. [place] she worried a lot about testing and did not , lesson " "in not worrying about things that have not happened yet. Feeling very accomplished after attending a wake alone without her . She stated it went very well and anxiety was lower than normal and then expected. Discussed decision making in the moment. Discussed it can be difficult to make decisions in the moment. Continues with coping skills, ie meditation and breathing tch.   FARIDA: na  Preg: na  Most important: Wants to address doses of medications.     3/14/23  Feels the propanol has been really helping with anxiety. She has been feeling like her self for the past 5 days. Continues to have several headaches. She is considering going back to yoga after looking as some neck stretches.     3/9/23 ([facility])  She has been more anxious on the different dosing of medications. She has also not noticed decrease in headaches. Discussed neck tension and using body scanning. She continues to feel a bit foggy in the afternoons.     3/3/23  Reports doing well. States she has been in communication with MD about headaches. Has noticed on days when she has lower dose of med she has more anxiety. She has been examining her anxiety when going out socially. Does not feel it is associated with the socializing itself but rather \"will the anxiety come up.\" Discussed upcoming appt for neuro psych testing. Is hesitant but willing to do testing. May want to talk to MD about it prior appt. Fearful about Drug Induced Parkinson's.       2/17/23  Pt reports she is doing well. Reports anxiety has been better. Able to control it better with accepting and reognition of anxiety. Continues to utilize meditation and review of skills taught in therapy. Ice packs has been helping with headaches. Self talk has much improved and has gained more confidence. Anxiety in the evening have improved but still feeling the anxiety at times. She has been wanting to work exercise into her daily routine. Sleep has been also improve. ID values: umm, family, self " "respect responsibility and kindness.       2/10/23  Pt reports she had such a good time with her trip to her sisters house. Much reduced anxious than expected (compared to home) and sleep was really good. Discussed implementing things that worked at sisters for home. Continues to have some anxiety when going into store or before events. Reports panic attacks are less than previous week. Discussed triggers for anxiety. Discussed do expectations of self meet values.  ID values from next time and look at expectations for self. Pt asked \"What could I do better to be good enough\". Struggles with being good enough for self. Talked about list of things she wants to achieve in order to feel \"good enough\". One being able to go to places with out wondering if she will be anxious.     Identified Triggers for anxiety and background thought behind it:  More anxious when tired: less bandwidth to utilize skills  Feeling going to be late  Making quick decisions  Technology   organizing big meal for company: what people expect of her  MD visits or waiting for visit: anticipations of events at MD, has not had poor experiences, husbands medical experience   What if something happens to [employer]   Packing groceries and people are behind her  Sometimes going out to eat (much better): menu with too many options and having to make decisions  Holiday: anticipation and wanting it to be perfect for people/what ng people expect of her  Dealing with insurance problem: hassle and being out of her control   Being in the unknown about things: Slowness of recovery from anxiety, what ifs  Do people see I am anxious  Large crowd of people: leading up to and during (highest), people might be noticing her, dont want to stand   *Things that need to be done but are not done(floor needing to be washed, getting decorations down etc): likes list to check off (what to accomplish that week), Not accomplishing goal. Self placed expectations (high standards from " growing up), perfectionism (mom perfectionism as a child), some self comparisons to others, taking on too much during a week, and expecting to be like old self all the time (compare self to old self).     2/1/23  Pt is reporting she is doing well. She has been out driving to do shopping and seeing friends. Continues to have improvement with anxiety each day. Continues to have headaches but is wanting to change medications until she gets back from trip to her sisters home. Having some anxiety about spending over night for the first time since COVID. Discussed steps to addressing anxiety at her sisters home and drive up. 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 Became dizzy and become worried about the physical sx she was experiencing. Anxiety grew from that point on. Discussed how she handled it in the moment with redirecting thoughts, accepting anxiety and attempting some of the anxiety tech discussed. Pt will explore other situations in which anxiety tends to arise for next session. Pt has been keeping a journal of her daily experience and will reexamine journal to better understand anxiety. Pt listed several situations she knows brings anxiety.      Identified Triggers:  Feeling rushed  Not wanting to be late  Having others do responsibilities she once carried as her responsibility  Being flexible  Expectations of others  What do others see/think of her when experiencing panic attack    1/23/23  Reports she is doing really well. Reports she will be driving up to see her grandson. She is highly looking forward to this and making val[employer] cards. Some anxiety with going in to the grocery store but not once she gets started shopping. Reports her ultimate goal would be to go to the mall alone with traffic as that has been causing anxiety. Headaches continue to be of concern to her. Discussed further and identified them coming following either a panic attack or after high anxiety.  Discussed using sensory tech for elevating the anxiety and also for following headache such as heating pad for neck or eyes. Discussed her conversation with Dr. Pompa about possibly changing meds to help with headaches. She reports relishing in current progress and is not wanting meds to change at this time. Headaches do interfere sometimes with daily life. She will be going to lunch with several college friends. This has caused a bit of anxiety in accepting the invite. Normalized experience for pt.       Progress on Treatment Objective(s) / Homework:  Satisfactory progress - ACTION (Actively working towards change); Intervened by reinforcing change plan / affirming steps taken    Motivational Interviewing    MI Intervention: Expressed Empathy/Understanding, Supported Autonomy, Collaboration, Evocation, Open-ended questions, Change talk (evoked) and Reframe     Change Talk Expressed by the Patient: Desire to change Ability to change Reasons to change Need to change    Provider Response to Change Talk: E - Evoked more info from patient about behavior change, A - Affirmed patient's thoughts, decisions, or attempts at behavior change, R - Reflected patient's change talk and S - Summarized patient's change talk statements      Care Plan review completed: Yes    Medication Review:  No changes to current psychiatric medication(s)    Medication Compliance:  Yes    Changes in Health Issues:   None reported    Chemical Use Review:   Substance Use: Chemical use reviewed, no active concerns identified      Tobacco Use: No current tobacco use.      Assessment: Current Emotional / Mental Status (status of significant symptoms):  Risk status (Self / Other harm or suicidal ideation)  Patient denies a history of suicidal ideation, suicide attempts, self-injurious behavior, homicidal ideation, homicidal behavior and and other safety concerns  Patient denies current fears or concerns for personal safety.  Patient denies current or  recent suicidal ideation or behaviors.  Patient denies current or recent homicidal ideation or behaviors.  Patient denies current or recent self injurious behavior or ideation.  Patient denies other safety concerns.  A safety and risk management plan has not been developed at this time, however patient was encouraged to call [place] [phone] should there be a change in any of these risk factors.    Appearance:   Appropriate   Eye Contact:   Good   Psychomotor Behavior: Normal   Attitude:   Cooperative   Orientation:   All  Speech   Rate / Production: Normal    Volume:  Normal   Mood:    Anxious   Affect:    Appropriate   Thought Content:  Clear   Thought Form:  Coherent  Logical   Insight:    Good     Diagnoses:  1. Generalized anxiety disorder    2. Mood disorder (H)    3. Insomnia, unspecified type          Collateral Reports Completed:  Not Applicable    Plan: (Homework, other):  Patient was given information about behavioral services and encouraged to schedule a follow up appointment with the clinic [facility] in 1 week.  She was also given information about mental health symptoms and treatment options .  CD Recommendations: No indications of CD issues.   Erika [facility]      ______________________________________________________________________    Integrated Primary Care Behavioral Health Treatment Plan    Patient's Name: Jolynn Haji  YOB: 1955    Date of Creation: 10/5/22  Date Treatment Plan Last Reviewed/Revised: 8/2/23    DSM5 Diagnoses: 300.02 (F41.1) Generalized Anxiety Disorder  Psychosocial / Contextual Factors: Individual Factors high anxiety associated with depressive sx, interfereing with ability to function as she would like.  .  PROMIS (reviewed every 90 days):     Referral / Collaboration:  Was/were discussed and patient will pursue. - individual therapy    Anticipated number of session for this episode of care: 5-8 sessions  Anticipation frequency of session: Weekly  Anticipated  Duration of each session: 38-52 minutes  Treatment plan will be reviewed in 90 days or when goals have been changed.       MeasurableTreatment Goal(s) related to diagnosis / functional impairment(s)  Goal 1: Patient will work with provider to manage symptoms    I will know I've met my goal when when less anxious and feeling down.      Objective #A (Patient Action)    Patient will   attend all sessions .  Status: Continued - Date(s): 8/2/23    Intervention(s)  Therapist will teach  educate patient on treatment options, clarify concerns, work with pt to overcome any resistance to compliance. .    Objective #B  Patient will identify 3 fears / thoughts that contribute to feeling anxious.  Status: Continued - Date(s): 8/2/23    Intervention(s)  Therapist will  educate patient on treatment options, clarify concerns, work with pt to overcome any resistance to compliance. .    Objective #C  Patient will identify 3 initial signs or symptoms of anxiety.  Status: Continued - Date(s): 8/2/23    Intervention(s)  Therapist will  educate patient on treatment options, clarify concerns, work with pt to overcome any resistance to compliance. .        Patient has reviewed and agreed to the above plan.      DINESH Whipple  August 16, 2023  Answers for HPI/ROS submitted by the patient on 12/6/2022  TIFFANY 7 TOTAL SCORE: 3  Answers for HPI/ROS submitted by the patient on 3/8/2023  If you checked off any problems, how difficult have these problems made it for you to do your work, take care of things at home, or get along with other people?: Not difficult at all  PHQ9 TOTAL SCORE: 5  Answers for HPI/ROS submitted by the patient on 4/26/2023  If you checked off any problems, how difficult have these problems made it for you to do your work, take care of things at home, or get along with other people?: Somewhat difficult  PHQ9 TOTAL SCORE: 1  Answers for HPI/ROS submitted by the patient on 6/12/2023  If you checked off any problems, how  difficult have these problems made it for you to do your work, take care of things at home, or get along with other people?: Not difficult at all  PHQ9 TOTAL SCORE: 1  Answers for HPI/ROS submitted by the patient on 7/18/2023  If you checked off any problems, how difficult have these problems made it for you to do your work, take care of things at home, or get along with other people?: Not difficult at all  PHQ9 TOTAL SCORE: 1

## 2023-08-16 ENCOUNTER — VIRTUAL VISIT (OUTPATIENT)
Dept: BEHAVIORAL HEALTH | Facility: CLINIC | Age: 68
End: 2023-08-16
Payer: COMMERCIAL

## 2023-08-16 DIAGNOSIS — F39 MOOD DISORDER (H): ICD-10-CM

## 2023-08-16 DIAGNOSIS — F41.1 GENERALIZED ANXIETY DISORDER: Primary | ICD-10-CM

## 2023-08-16 DIAGNOSIS — G47.00 INSOMNIA, UNSPECIFIED TYPE: ICD-10-CM

## 2023-08-16 PROCEDURE — 90834 PSYTX W PT 45 MINUTES: CPT | Mod: 95 | Performed by: SOCIAL WORKER

## 2023-08-22 NOTE — PATIENT INSTRUCTIONS
Open Wound Care     for ______________        ? No strenuous activity for 48 hours    ? Take Tylenol as needed for discomfort.                                                .         ? Do not drink alcoholic beverages for 48 hours.    ? Keep the pressure bandage in place for 24 hours. If the bandage becomes blood tinged or loose, reinforce it with gauze and tape.        (Refer to the reverse side of this page for management of bleeding).    ? Remove bandage in 24 hours and begin wound care as follows:     1. Clean area with tap water using a Q tip or gauze pad, (shower / bathe normally)  2. Dry wound with Q tip or gauze pad  3. Apply Aquaphor, Vaseline, Polysporin or Bacitracin Ointment with a Q tip    Do NOT use Neosporin Ointment *  4. Cover the wound with a band-aid or nonstick gauze pad and paper tape.  5. Repeat wound care once a day until wound is completely healed.    It is an old wives tale that a wound heals better when it is exposed to air and allowed to dry out. The wound will heal faster with a better cosmetic result if it is kept moist with ointment and covered with a bandage.  Do not let the wound dry out.      Supplies Needed:                Qtips or gauze pads                Polysporin or Bacitracin Ointment                Bandaids or nonstick gauze pads and paper tape    Wound care kits and brown paper tape are available for purchase at   the pharmacy.    BLEEDIN. Use tightly rolled up gauze or cloth to apply direct pressure over the bandage for 20   minutes.  2. Reapply pressure for an additional 20 minutes if necessary  3. Call the office or go to the nearest emergency room if pressure fails to stop the bleeding.  4. Use additional gauze and tape to maintain pressure once the bleeding has stopped.  5. Begin wound care 24 hours after surgery as directed.                  WOUND HEALING    1. One week after surgery a pink / red halo will form around the outside of the wound.   This is new  Problem: Discharge Planning  Goal: Discharge to home or other facility with appropriate resources  Outcome: Progressing  Flowsheets (Taken 8/22/2023 0822)  Discharge to home or other facility with appropriate resources:   Identify barriers to discharge with patient and caregiver   Arrange for needed discharge resources and transportation as appropriate   Identify discharge learning needs (meds, wound care, etc)     Problem: Skin/Tissue Integrity  Goal: Absence of new skin breakdown  Description: 1. Monitor for areas of redness and/or skin breakdown  2. Assess vascular access sites hourly  3. Every 4-6 hours minimum:  Change oxygen saturation probe site  4. Every 4-6 hours:  If on nasal continuous positive airway pressure, respiratory therapy assess nares and determine need for appliance change or resting period.   Outcome: Progressing     Problem: Safety - Adult  Goal: Free from fall injury  Outcome: Progressing     Problem: Cardiovascular - Adult  Goal: Maintains optimal cardiac output and hemodynamic stability  Outcome: Progressing  Flowsheets (Taken 8/22/2023 0822)  Maintains optimal cardiac output and hemodynamic stability:   Monitor blood pressure and heart rate   Monitor urine output and notify Licensed Independent Practitioner for values outside of normal range   Assess for signs of decreased cardiac output   Administer fluid and/or volume expanders as ordered  Goal: Absence of cardiac dysrhythmias or at baseline  Outcome: Progressing  Flowsheets (Taken 8/22/2023 4880)  Absence of cardiac dysrhythmias or at baseline:   Monitor cardiac rate and rhythm   Assess for signs of decreased cardiac output     Problem: Gastrointestinal - Adult  Goal: Minimal or absence of nausea and vomiting  Outcome: Progressing  Flowsheets (Taken 8/22/2023 9899)  Minimal or absence of nausea and vomiting:   Administer IV fluids as ordered to ensure adequate hydration   Maintain NPO status until nausea and vomiting are resolved skin.  2. The center of the wound will appear yellowish white and produce some drainage.  3. The pink halo will slowly migrate in toward the center of the wound until the wound is covered with new shiny pink skin.  4. There will be no more drainage when the wound is completely healed.  5. It will take six months to one year for the redness to fade.  6. The scar may be itchy, tight and sensitive to extreme temperatures for a year after the surgery.  7. Massaging the area several times a day for several minutes after the wound is completely healed will help the scar soften and normalize faster. Begin massage only after healing is complete.      In case of emergency call: Dr Rogers: [phone]     [facility]: [phone]    [facility]: [phone]       output, blood pressure (other measures as available)  Goal: Glucose maintained within prescribed range  Outcome: Progressing  Flowsheets (Taken 8/22/2023 3394)  Glucose maintained within prescribed range:   Monitor blood glucose as ordered   Assess for signs and symptoms of hyperglycemia and hypoglycemia   Administer ordered medications to maintain glucose within target range     Problem: Pain  Goal: Verbalizes/displays adequate comfort level or baseline comfort level  Outcome: Progressing  Flowsheets (Taken 8/22/2023 0757)  Verbalizes/displays adequate comfort level or baseline comfort level:   Encourage patient to monitor pain and request assistance   Assess pain using appropriate pain scale   Administer analgesics based on type and severity of pain and evaluate response     Problem: Nutrition Deficit:  Goal: Optimize nutritional status  Outcome: Progressing

## 2023-08-24 NOTE — PROGRESS NOTES
[facility] Integrated Behavioral Health  August 28, 2023      Behavioral Health Clinician Progress Note    Patient Name: Jolynn Haji           Service Type:  Individual      Service Location:   MyChart / Email (patient reached)     Session Start Time: 10:30 am   Session End Time: 11:02 am      Session Length: 38 - 52      Attendees: Patient     Service Modality:  Video Visit:      Provider verified identity through the following two step process.  Patient provided:  Patient is known previously to provider    Telemedicine Visit: The patient's condition can be safely assessed and treated via synchronous audio and visual telemedicine encounter.      Reason for Telemedicine Visit: Patient has requested telehealth visit    Originating Site (Patient Location): Patient's home    Distant Site (Provider Location): [facility]    Consent:  The patient/guardian has verbally consented to: the potential risks and benefits of telemedicine (video visit) versus in person care; bill my insurance or make self-payment for services provided; and responsibility for payment of non-covered services.     Patient would like the video invitation sent by:  My Chart    Mode of Communication:  Video Conference via [employer]well    As the provider I attest to compliance with applicable laws and regulations related to telemedicine.    Visit Activities (Refresh list every visit): [facility] Only    Diagnostic Assessment Date: 9/27/22  Treatment Plan Review Date: 11/2/23  See Flowsheets for today's PHQ-9 and TIFFANY-7 results  Previous PHQ-9:       6/12/2023     8:47 PM 7/11/2023     7:37 PM 7/18/2023    11:30 AM   PHQ-9 SCORE   PHQ-9 Total Score MyChart 1 (Minimal depression) 1 (Minimal depression) 1 (Minimal depression)   PHQ-9 Total Score 1 1 1     Previous TIFFANY-7:       5/3/2023     1:18 PM 7/10/2023    10:54 AM 7/11/2023     7:38 PM   TIFFANY-7 SCORE   Total Score 3 (minimal anxiety) 2 (minimal anxiety)  1 (minimal anxiety)   Total Score 3 2 1       KARINA LEVEL:      8/27/2010     3:00 PM 2/22/2011    11:00 AM   KARINA Score (Last Two)   KRAINA Raw Score 51 49   Activation Score 91.6 82.8   KARINA Level 4 4       DATA  Extended Session (60+ minutes): No  Interactive Complexity: No  Crisis: No  [facility] Patient: No    Treatment Objective(s) Addressed in This Session:  Target Behavior(s):  anxiety and depression    Depressed Mood: Increase interest, engagement, and pleasure in doing things  Decrease frequency and intensity of feeling down, depressed, hopeless  Improve quantity and quality of night time sleep / decrease daytime naps  Feel less tired and more energy during the day   Improve diet, appetite, mindful eating, and / or meal planning  Identify negative self-talk and behaviors: challenge core beliefs, myths, and actions  Improve concentration, focus, and mindfulness in daily activities   Feel less fidgety, restless or slow in daily activities / interpersonal interactions  Anxiety: will experience a reduction in anxiety, will develop more effective coping skills to manage anxiety symptoms, will develop healthy cognitive patterns and beliefs and will increase ability to function adaptively    Current Stressors / Issues:  Reporting more good days than bad days. Having more anxiety days since cutting back on medications. She has not been altering her behaviors in life. Preparing for Labor day weekend with family. She is having minimal anxiety. Reinforced skills and continued practice. States she has been reading recommended books with other suggestions for tx of anxiety. She has been enjoying trying different tech as well.     8/16/23  Reports weaning off some of her anxiety medications in attempts to reduce headaches. Has been not been noticing much of a change in her anxiety. She has been working on increasing old activities she had been doing over a year ago prior experiencing high anxiety. Reports one panic attack related to  making many decisions and a hectic situation. Attempted to do breathing tech to self sooth. Reports rapid heart rate lasted almost the rest of day despite use of multiple techs. Discussed nature of anxiety and educated on how panic attacks effect on overall system with in the body. Discussed being easy on self and avoiding self  of progress with anxiety.     8/2/23  Reporting spending time with grand children which is always refreshing to her. She has been volunteering for a summer school program. Reporting having a larger groups adults causes her anxiety/headaches. Discussed saying no as part of self care. She has been extending self more to engage in things she likes but has been noting more tiredness and anxiety.       Progress on Treatment Objective(s) / Homework:  Satisfactory progress - ACTION (Actively working towards change); Intervened by reinforcing change plan / affirming steps taken    Motivational Interviewing    MI Intervention: Expressed Empathy/Understanding, Supported Autonomy, Collaboration, Evocation, Open-ended questions, Change talk (evoked) and Reframe     Change Talk Expressed by the Patient: Desire to change Ability to change Reasons to change Need to change    Provider Response to Change Talk: E - Evoked more info from patient about behavior change, A - Affirmed patient's thoughts, decisions, or attempts at behavior change, R - Reflected patient's change talk and S - Summarized patient's change talk statements      Care Plan review completed: Yes    Medication Review:  No changes to current psychiatric medication(s)    Medication Compliance:  Yes    Changes in Health Issues:   None reported    Chemical Use Review:   Substance Use: Chemical use reviewed, no active concerns identified      Tobacco Use: No current tobacco use.      Assessment: Current Emotional / Mental Status (status of significant symptoms):  Risk status (Self / Other harm or suicidal ideation)  Patient denies a history  of suicidal ideation, suicide attempts, self-injurious behavior, homicidal ideation, homicidal behavior and and other safety concerns  Patient denies current fears or concerns for personal safety.  Patient denies current or recent suicidal ideation or behaviors.  Patient denies current or recent homicidal ideation or behaviors.  Patient denies current or recent self injurious behavior or ideation.  Patient denies other safety concerns.  A safety and risk management plan has not been developed at this time, however patient was encouraged to call [place] [phone] should there be a change in any of these risk factors.    Appearance:   Appropriate   Eye Contact:   Good   Psychomotor Behavior: Normal   Attitude:   Cooperative   Orientation:   All  Speech   Rate / Production: Normal    Volume:  Normal   Mood:    Anxious   Affect:    Appropriate   Thought Content:  Clear   Thought Form:  Coherent  Logical   Insight:    Good     Diagnoses:  1. Generalized anxiety disorder    2. Mood disorder (H)    3. Insomnia, unspecified type          Collateral Reports Completed:  Not Applicable    Plan: (Homework, other):  Patient was given information about behavioral services and encouraged to schedule a follow up appointment with the clinic [facility] in 1 week.  She was also given information about mental health symptoms and treatment options .  CD Recommendations: No indications of CD issues.   Erika Nolan [facility]      ______________________________________________________________________    Integrated Primary Care Behavioral Health Treatment Plan    Patient's Name: Jolynn Haji  YOB: 1955    Date of Creation: 10/5/22  Date Treatment Plan Last Reviewed/Revised: 8/2/23    DSM5 Diagnoses: 300.02 (F41.1) Generalized Anxiety Disorder  Psychosocial / Contextual Factors: Individual Factors high anxiety associated with depressive sx, interfereing with ability to function as she would like.  .  PROMIS (reviewed every 90 days):      Referral / Collaboration:  Was/were discussed and patient will pursue. - individual therapy    Anticipated number of session for this episode of care: 5-8 sessions  Anticipation frequency of session: Weekly  Anticipated Duration of each session: 38-52 minutes  Treatment plan will be reviewed in 90 days or when goals have been changed.       MeasurableTreatment Goal(s) related to diagnosis / functional impairment(s)  Goal 1: Patient will work with provider to manage symptoms    I will know I've met my goal when when less anxious and feeling down.      Objective #A (Patient Action)    Patient will   attend all sessions .  Status: Continued - Date(s): 8/2/23    Intervention(s)  Therapist will teach  educate patient on treatment options, clarify concerns, work with pt to overcome any resistance to compliance. .    Objective #B  Patient will identify 3 fears / thoughts that contribute to feeling anxious.  Status: Continued - Date(s): 8/2/23    Intervention(s)  Therapist will  educate patient on treatment options, clarify concerns, work with pt to overcome any resistance to compliance. .    Objective #C  Patient will identify 3 initial signs or symptoms of anxiety.  Status: Continued - Date(s): 8/2/23    Intervention(s)  Therapist will  educate patient on treatment options, clarify concerns, work with pt to overcome any resistance to compliance. .        Patient has reviewed and agreed to the above plan.      Erika Francisco [facility]  August 28, 2023  Answers for HPI/ROS submitted by the patient on 12/6/2022  TIFFANY 7 TOTAL SCORE: 3  Answers for HPI/ROS submitted by the patient on 3/8/2023  If you checked off any problems, how difficult have these problems made it for you to do your work, take care of things at home, or get along with other people?: Not difficult at all  PHQ9 TOTAL SCORE: 5  Answers for HPI/ROS submitted by the patient on 4/26/2023  If you checked off any problems, how difficult have these problems made it for  you to do your work, take care of things at home, or get along with other people?: Somewhat difficult  PHQ9 TOTAL SCORE: 1  Answers for HPI/ROS submitted by the patient on 6/12/2023  If you checked off any problems, how difficult have these problems made it for you to do your work, take care of things at home, or get along with other people?: Not difficult at all  PHQ9 TOTAL SCORE: 1  Answers for HPI/ROS submitted by the patient on 7/18/2023  If you checked off any problems, how difficult have these problems made it for you to do your work, take care of things at home, or get along with other people?: Not difficult at all  PHQ9 TOTAL SCORE: 1Answers submitted by the patient for this visit:  Patient Health Questionnaire (Submitted on 8/27/2023)  If you checked off any problems, how difficult have these problems made it for you to do your work, take care of things at home, or get along with other people?: Not difficult at all  PHQ9 TOTAL SCORE: 1

## 2023-08-25 ENCOUNTER — HOSPITAL ENCOUNTER (OUTPATIENT)
Facility: CLINIC | Age: 68
Discharge: HOME | End: 2023-08-25
Attending: INTERNAL MEDICINE | Admitting: INTERNAL MEDICINE
Payer: COMMERCIAL

## 2023-08-25 VITALS
RESPIRATION RATE: 16 BRPM | HEIGHT: 68 IN | OXYGEN SATURATION: 98 % | BODY MASS INDEX: 21.37 KG/M2 | HEART RATE: 70 BPM | WEIGHT: 141 LBS | SYSTOLIC BLOOD PRESSURE: 114 MMHG | DIASTOLIC BLOOD PRESSURE: 67 MMHG

## 2023-08-25 DIAGNOSIS — Z12.11 SPECIAL SCREENING FOR MALIGNANT NEOPLASMS, COLON: Primary | ICD-10-CM

## 2023-08-25 LAB — COLONOSCOPY: NORMAL

## 2023-08-25 PROCEDURE — G0500 MOD SEDAT ENDO SERVICE >5YRS: HCPCS | Performed by: INTERNAL MEDICINE

## 2023-08-25 PROCEDURE — G0105 COLORECTAL SCRN; HI RISK IND: HCPCS | Performed by: INTERNAL MEDICINE

## 2023-08-25 PROCEDURE — 250N000011 HC RX IP 250 OP 636: Performed by: INTERNAL MEDICINE

## 2023-08-25 PROCEDURE — 45378 DIAGNOSTIC COLONOSCOPY: CPT | Performed by: INTERNAL MEDICINE

## 2023-08-25 RX ORDER — SIMETHICONE 40MG/0.6ML
133 SUSPENSION, DROPS(FINAL DOSAGE FORM)(ML) ORAL
Status: DISCONTINUED | OUTPATIENT
Start: 2023-08-25 | End: 2023-08-25 | Stop reason: HOSPADM

## 2023-08-25 RX ORDER — ONDANSETRON 2 MG/ML
4 INJECTION INTRAMUSCULAR; INTRAVENOUS
Status: COMPLETED | OUTPATIENT
Start: 2023-08-25 | End: 2023-08-25

## 2023-08-25 RX ORDER — NALOXONE HYDROCHLORIDE 0.4 MG/ML
0.2 INJECTION, SOLUTION INTRAMUSCULAR; INTRAVENOUS; SUBCUTANEOUS
Status: DISCONTINUED | OUTPATIENT
Start: 2023-08-25 | End: 2023-08-25 | Stop reason: HOSPADM

## 2023-08-25 RX ORDER — LIDOCAINE 40 MG/G
CREAM TOPICAL
Status: DISCONTINUED | OUTPATIENT
Start: 2023-08-25 | End: 2023-08-25 | Stop reason: HOSPADM

## 2023-08-25 RX ORDER — PROCHLORPERAZINE MALEATE 5 MG
5 TABLET ORAL EVERY 6 HOURS PRN
Status: DISCONTINUED | OUTPATIENT
Start: 2023-08-25 | End: 2023-08-25 | Stop reason: HOSPADM

## 2023-08-25 RX ORDER — FLUMAZENIL 0.1 MG/ML
0.2 INJECTION, SOLUTION INTRAVENOUS
Status: DISCONTINUED | OUTPATIENT
Start: 2023-08-25 | End: 2023-08-25 | Stop reason: HOSPADM

## 2023-08-25 RX ORDER — ATROPINE SULFATE 0.1 MG/ML
1 INJECTION INTRAVENOUS
Status: DISCONTINUED | OUTPATIENT
Start: 2023-08-25 | End: 2023-08-25 | Stop reason: HOSPADM

## 2023-08-25 RX ORDER — DIPHENHYDRAMINE HYDROCHLORIDE 50 MG/ML
25-50 INJECTION INTRAMUSCULAR; INTRAVENOUS
Status: DISCONTINUED | OUTPATIENT
Start: 2023-08-25 | End: 2023-08-25 | Stop reason: HOSPADM

## 2023-08-25 RX ORDER — ONDANSETRON 4 MG/1
4 TABLET, ORALLY DISINTEGRATING ORAL EVERY 6 HOURS PRN
Status: DISCONTINUED | OUTPATIENT
Start: 2023-08-25 | End: 2023-08-25 | Stop reason: HOSPADM

## 2023-08-25 RX ORDER — EPINEPHRINE 1 MG/ML
0.1 INJECTION, SOLUTION INTRAMUSCULAR; SUBCUTANEOUS
Status: DISCONTINUED | OUTPATIENT
Start: 2023-08-25 | End: 2023-08-25 | Stop reason: HOSPADM

## 2023-08-25 RX ORDER — FENTANYL CITRATE 50 UG/ML
50-100 INJECTION, SOLUTION INTRAMUSCULAR; INTRAVENOUS EVERY 5 MIN PRN
Status: DISCONTINUED | OUTPATIENT
Start: 2023-08-25 | End: 2023-08-25 | Stop reason: HOSPADM

## 2023-08-25 RX ORDER — NALOXONE HYDROCHLORIDE 0.4 MG/ML
0.4 INJECTION, SOLUTION INTRAMUSCULAR; INTRAVENOUS; SUBCUTANEOUS
Status: DISCONTINUED | OUTPATIENT
Start: 2023-08-25 | End: 2023-08-25 | Stop reason: HOSPADM

## 2023-08-25 RX ORDER — ONDANSETRON 2 MG/ML
4 INJECTION INTRAMUSCULAR; INTRAVENOUS EVERY 6 HOURS PRN
Status: DISCONTINUED | OUTPATIENT
Start: 2023-08-25 | End: 2023-08-25 | Stop reason: HOSPADM

## 2023-08-25 RX ADMIN — MIDAZOLAM 2 MG: 1 INJECTION INTRAMUSCULAR; INTRAVENOUS at 09:52

## 2023-08-25 RX ADMIN — ONDANSETRON 4 MG: 2 INJECTION INTRAMUSCULAR; INTRAVENOUS at 09:47

## 2023-08-25 RX ADMIN — FENTANYL CITRATE 100 MCG: 50 INJECTION INTRAMUSCULAR; INTRAVENOUS at 09:52

## 2023-08-25 ASSESSMENT — ACTIVITIES OF DAILY LIVING (ADL): ADLS_ACUITY_SCORE: 33

## 2023-08-25 NOTE — H&P
Pre-Endoscopy History and Physical     Jolynn Haji MRN# [id number]   YOB: 1955 Age: 68 year old     Date of Procedure: 8/25/2023  Primary care provider: Helga Ibarra  Type of Endoscopy: Colonoscopy with possible biopsy, possible polypectomy  Reason for Procedure: polyp  Type of Anesthesia Anticipated: Conscious Sedation    HPI:    Jolynn is a 68 year old female who will be undergoing the above procedure.      A history and physical has been performed. The patient's medications and allergies have been reviewed. The risks and benefits of the procedure and the sedation options and risks were discussed with the patient.  All questions were answered and informed consent was obtained.      She denies a personal or family history of anesthesia complications or bleeding disorders.     Patient Active Problem List   Diagnosis    Essential hypertension with goal blood pressure less than 140/90    Hidradenitis    Localized osteoarthritis of hand    Synovial cyst of popliteal space    DERMATOPHYTOSIS OF NAIL- toenails     Lipidosis    Osteopenia    Postmenopausal    NUMBNESS AND TINGLING OF FOOT - bilateral -mild     Numbness of feet- distal feet R>L     Stress incontinence, female - mild    Cough    Acute bronchitis- recurrent- ? related to mold in school building- pt has since retired and no further bronchitis    Rosacea    History of migraine headaches- assoc. with nausea/vomiting - resolved around menopause - were  premenstrual     Dupuytren's disease of palm - right 4th digit flexor tendon    Sun-damaged skin    Seasonal allergic rhinitis    Basal cell carcinoma of leg, right    Adjustment disorder with mixed anxiety and depressed mood- mild-moderate  at this time    Family history of celiac disease    Generalized anxiety disorder    Insomnia, unspecified type    Chronic constipation    Raynaud's phenomenon without gangrene    Other secondary osteoarthritis of left knee    Primary localized  osteoarthrosis of left lower leg    Hyperlipidemia LDL goal <130    Basal cell carcinoma    Colon polyps    Screening for cervical cancer    Adjustment insomnia - resolved as of 2023 - off of ambien with psychiatry's okay     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 - much improved - resolved as of 2023     Drug-induced parkinsonism (H)- Abilify - generic = ARIPiprazole( 5mg) - RESOLVED OFF ABILIFY     Mood disorder (H)    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 - lower pretibial area         Past Medical History:   Diagnosis Date    Abnormal glandular Papanicolaou smear of cervix- ASCUS in 2003    ASC H-     Basal cell carcinoma of leg, right 2016    Dr Rogers    Colon polyps 2018    tubular adenoam - due 5 yrs - 2 mm    Concussion without loss of consciousness 2017    Hidradenitis     left  groin    HSIL (high grade squamous intraepithelial lesion) on Pap smear of cervix 2000    Normal paps from  to /cc    Hypertension goal BP (blood pressure) < 140/90     OA (osteoarthritis) of knee     moderate    Postmenopausal since 2008     Shingles 2010    left     Squamous cell carcinoma of skin, unspecified     Synovial cyst of popliteal space         Past Surgical History:   Procedure Laterality Date    COLONOSCOPY  2018    polyps, tubular - 2 mm - due 5 yrs    ZZC NONSPECIFIC PROCEDURE      Colposcopy       Social History     Tobacco Use    Smoking status: Never    Smokeless tobacco: Never   Substance Use Topics    Alcohol use: Yes     Alcohol/week: 0.0 - 1.0 standard drinks of alcohol     Comment: 2 per month       Family History   Problem Relation Age of Onset    Thyroid Disease Mother         Graves disease    Hypertension Mother     Diabetes Mother         type 2    Blood Disease Mother          from leukemia at age 78    Heart Disease Father 62         MV problem with CHF  at 62/ from MI    Hypertension Father     Diabetes Son         Juvenile Diabetes    Diabetes Daughter         Juvenile Diabetes    Cancer Maternal Uncle         type ??    Cancer Maternal Uncle         liver    Colon Cancer No family hx of        Prior to Admission medications    Medication Sig Start Date End Date Taking? Authorizing Provider   bisacodyl (DULCOLAX) 5 MG EC tablet Take 2 tablets at 3 pm the day before your procedure. If your procedure is before 11 am, take 2 additional tablets at 11 pm. If your procedure is after 11 am, take 2 additional tablets at 6 am. For additional instructions refer to your colonoscopy prep instructions. 23  Yes Yogesh Cedeño MD   lactobacillus rhamnosus, GG, (CULTURELL) capsule Take 1 capsule by mouth daily Taking every other day   Yes Reported, Patient   lisinopril (ZESTRIL) 2.5 MG tablet Take 1 tablet (2.5 mg) by mouth daily 23  Yes Helga Ibarra MD   polyethylene glycol (GOLYTELY) 236 g suspension The night before the exam at 6 pm drink an 8-ounce glass every 15 minutes until the jug is half empty. If you arrive before 11 AM: Drink the other half of the Golytely jug at 11 PM night before procedure. If you arrive after 11 AM: Drink the other half of the Golytely jug at 6 AM day of procedure. For additional instructions refer to your colonoscopy prep instructions. 23  Yes Yogesh Cedeño MD   propranolol (INDERAL) 20 MG tablet Take 1 tablet (20 mg) by mouth 2 times daily 23  Yes Judy Pompa DO   traZODone (DESYREL) 50 MG tablet Take 0.5-1 tablets (25-50 mg) by mouth nightly as needed for sleep 23  Yes Judy Pompa DO   venlafaxine (EFFEXOR XR) 37.5 MG 24 hr capsule Take one cap by mouth WITH 75 mg cap for total daily dose 112.5 mg. 23  Yes Judy Pompa DO   venlafaxine (EFFEXOR XR) 75 MG 24 hr capsule Take one cap by mouth WITH 37.5 mg cap for total daily dose 112.5 mg. 23  Yes  "Judy Pompa,    clindamycin (CLINDAGEL) 1 % external gel Apply topically daily 1/31/22   Helga Ibarra MD       Allergies   Allergen Reactions    Abilify [Aripiprazole] Other (See Comments)     Suspected drug-induced Parkinsonism     Fosamax Diarrhea     Diarrhea, stomach cramps, jaw pain     Amoxicillin-Pot Clavulanate Itching and Rash    Amoxicillin-Pot Clavulanate Itching and Rash        REVIEW OF SYSTEMS:   5 point ROS negative except as noted above in HPI, including Gen., Resp., CV, GI &  system review.    PHYSICAL EXAM:   Ht 1.727 m (5' 8\")   Wt 64 kg (141 lb)   LMP 06/28/2001   BMI 21.44 kg/m   Estimated body mass index is 21.44 kg/m  as calculated from the following:    Height as of this encounter: 1.727 m (5' 8\").    Weight as of this encounter: 64 kg (141 lb).   GENERAL APPEARANCE: alert, and oriented  MENTAL STATUS: alert  AIRWAY EXAM: Mallampatti Class I (visualization of the soft palate, fauces, uvula, anterior and posterior pillars)  RESP: lungs clear to auscultation - no rales, rhonchi or wheezes  CV: regular rates and rhythm  DIAGNOSTICS:    Not indicated    IMPRESSION   ASA Class 2 - Mild systemic disease    PLAN:   Plan for Colonoscopy with possible biopsy, possible polypectomy. We discussed the risks, benefits and alternatives and the patient wished to proceed.    The above has been forwarded to the consulting provider.      Signed Electronically by: Yogesh Cedeño MD  August 25, 2023          "

## 2023-08-28 ENCOUNTER — VIRTUAL VISIT (OUTPATIENT)
Dept: BEHAVIORAL HEALTH | Facility: CLINIC | Age: 68
End: 2023-08-28
Payer: COMMERCIAL

## 2023-08-28 DIAGNOSIS — F39 MOOD DISORDER (H): ICD-10-CM

## 2023-08-28 DIAGNOSIS — G47.00 INSOMNIA, UNSPECIFIED TYPE: ICD-10-CM

## 2023-08-28 DIAGNOSIS — F41.1 GENERALIZED ANXIETY DISORDER: Primary | ICD-10-CM

## 2023-08-28 PROCEDURE — 90832 PSYTX W PT 30 MINUTES: CPT | Mod: VID | Performed by: SOCIAL WORKER

## 2023-09-05 NOTE — PROGRESS NOTES
[facility]al [facility]  September 7, 2023      Behavioral Health Clinician Progress Note    Patient Name: Jolynn Haji           Service Type:  Individual      Service Location:   MyChart / Email (patient reached)     Session Start Time: 10:30 am   Session End Time: 11:15 am      Session Length: 38 - 52      Attendees: Patient     Service Modality:  Video Visit:      Provider verified identity through the following two step process.  Patient provided:  Patient is known previously to provider    Telemedicine Visit: The patient's condition can be safely assessed and treated via synchronous audio and visual telemedicine encounter.      Reason for Telemedicine Visit: Patient has requested telehealth visit    Originating Site (Patient Location): Patient's home    Distant Site (Provider Location): [facility]    Consent:  The patient/guardian has verbally consented to: the potential risks and benefits of telemedicine (video visit) versus in person care; bill my insurance or make self-payment for services provided; and responsibility for payment of non-covered services.     Patient would like the video invitation sent by:  My Chart    Mode of Communication:  Video Conference via Amwell    As the provider I attest to compliance with applicable laws and regulations related to telemedicine.    Visit Activities (Refresh list every visit): [facility] Only    Diagnostic Assessment Date: 9/27/22  Treatment Plan Review Date: 11/2/23  See Flowsheets for today's PHQ-9 and TIFFANY-7 results  Previous PHQ-9:       7/11/2023     7:37 PM 7/18/2023    11:30 AM 8/27/2023     6:46 PM   PHQ-9 SCORE   PHQ-9 Total Score MyChart 1 (Minimal depression) 1 (Minimal depression) 1 (Minimal depression)   PHQ-9 Total Score 1 1 1     Previous TIFFANY-7:       5/3/2023     1:18 PM 7/10/2023    10:54 AM 7/11/2023     7:38 PM   TIFFANY-7 SCORE   Total Score 3 (minimal anxiety) 2 (minimal  anxiety) 1 (minimal anxiety)   Total Score 3 2 1       KARINA LEVEL:      8/27/2010     3:00 PM 2/22/2011    11:00 AM   KARINA Score (Last Two)   KARINA Raw Score 51 49   Activation Score 91.6 82.8   KARINA Level 4 4       DATA  Extended Session (60+ minutes): No  Interactive Complexity: No  Crisis: No  [facility] Patient: No    Treatment Objective(s) Addressed in This Session:  Target Behavior(s):  anxiety and depression    Depressed Mood: Increase interest, engagement, and pleasure in doing things  Decrease frequency and intensity of feeling down, depressed, hopeless  Improve quantity and quality of night time sleep / decrease daytime naps  Feel less tired and more energy during the day   Improve diet, appetite, mindful eating, and / or meal planning  Identify negative self-talk and behaviors: challenge core beliefs, myths, and actions  Improve concentration, focus, and mindfulness in daily activities   Feel less fidgety, restless or slow in daily activities / interpersonal interactions  Anxiety: will experience a reduction in anxiety, will develop more effective coping skills to manage anxiety symptoms, will develop healthy cognitive patterns and beliefs and will increase ability to function adaptively    Current Stressors / Issues:  Reports finishing up summer projects. Minimal anxiety as of late. Had a more difficult day yesterday with anxiety but it had passed. She was having some feelings of brain fog also. Sleep continues to be very good. Headaches are less frequent/severe but do persist. Discussed desire to be off meds completely as ultimate goal. Nothing she plans to rush into.       8/21/23  Reporting more good days than bad days. Having more anxiety days since cutting back on medications. She has not been altering her behaviors in life. Preparing for Labor day weekend with family. She is having minimal anxiety. Reinforced skills and continued practice. States she has been reading recommended books with other suggestions  for tx of anxiety. She has been enjoying trying different tech as well.     8/16/23  Reports weaning off some of her anxiety medications in attempts to reduce headaches. Has been not been noticing much of a change in her anxiety. She has been working on increasing old activities she had been doing over a year ago prior experiencing high anxiety. Reports one panic attack related to making many decisions and a hectic situation. Attempted to do breathing tech to self sooth. Reports rapid heart rate lasted almost the rest of day despite use of multiple techs. Discussed nature of anxiety and educated on how panic attacks effect on overall system with in the body. Discussed being easy on self and avoiding self  of progress with anxiety.     8/2/23  Reporting spending time with grand children which is always refreshing to her. She has been volunteering for a summer school program. Reporting having a larger groups adults causes her anxiety/headaches. Discussed saying no as part of self care. She has been extending self more to engage in things she likes but has been noting more tiredness and anxiety.       Progress on Treatment Objective(s) / Homework:  Satisfactory progress - ACTION (Actively working towards change); Intervened by reinforcing change plan / affirming steps taken    Motivational Interviewing    MI Intervention: Expressed Empathy/Understanding, Supported Autonomy, Collaboration, Evocation, Open-ended questions, Change talk (evoked) and Reframe     Change Talk Expressed by the Patient: Desire to change Ability to change Reasons to change Need to change    Provider Response to Change Talk: E - Evoked more info from patient about behavior change, A - Affirmed patient's thoughts, decisions, or attempts at behavior change, R - Reflected patient's change talk and S - Summarized patient's change talk statements      Care Plan review completed: Yes    Medication Review:  No changes to current psychiatric  medication(s)    Medication Compliance:  Yes    Changes in Health Issues:   None reported    Chemical Use Review:   Substance Use: Chemical use reviewed, no active concerns identified      Tobacco Use: No current tobacco use.      Assessment: Current Emotional / Mental Status (status of significant symptoms):  Risk status (Self / Other harm or suicidal ideation)  Patient denies a history of suicidal ideation, suicide attempts, self-injurious behavior, homicidal ideation, homicidal behavior and and other safety concerns  Patient denies current fears or concerns for personal safety.  Patient denies current or recent suicidal ideation or behaviors.  Patient denies current or recent homicidal ideation or behaviors.  Patient denies current or recent self injurious behavior or ideation.  Patient denies other safety concerns.  A safety and risk management plan has not been developed at this time, however patient was encouraged to call [place] [phone] should there be a change in any of these risk factors.    Appearance:   Appropriate   Eye Contact:   Good   Psychomotor Behavior: Normal   Attitude:   Cooperative   Orientation:   All  Speech   Rate / Production: Normal    Volume:  Normal   Mood:    Anxious   Affect:    Appropriate   Thought Content:  Clear   Thought Form:  Coherent  Logical   Insight:    Good     Diagnoses:  1. Generalized anxiety disorder    2. Mood disorder (H)    3. Insomnia, unspecified type            Collateral Reports Completed:  Not Applicable    Plan: (Homework, other):  Patient was given information about behavioral services and encouraged to schedule a follow up appointment with the clinic [facility] in 1 week.  She was also given information about mental health symptoms and treatment options .  CD Recommendations: No indications of CD issues.   Erika STARK LICSW      ______________________________________________________________________    Integrated Primary Care Behavioral Health Treatment  Plan    Patient's Name: Jolynn Haji  YOB: 1955    Date of Creation: 10/5/22  Date Treatment Plan Last Reviewed/Revised: 8/2/23    DSM5 Diagnoses: 300.02 (F41.1) Generalized Anxiety Disorder  Psychosocial / Contextual Factors: Individual Factors high anxiety associated with depressive sx, interfereing with ability to function as she would like.  .  PROMIS (reviewed every 90 days):     Referral / Collaboration:  Was/were discussed and patient will pursue. - individual therapy    Anticipated number of session for this episode of care: 5-8 sessions  Anticipation frequency of session: Weekly  Anticipated Duration of each session: 38-52 minutes  Treatment plan will be reviewed in 90 days or when goals have been changed.       MeasurableTreatment Goal(s) related to diagnosis / functional impairment(s)  Goal 1: Patient will work with provider to manage symptoms    I will know I've met my goal when when less anxious and feeling down.      Objective #A (Patient Action)    Patient will   attend all sessions .  Status: Continued - Date(s): 8/2/23    Intervention(s)  Therapist will teach  educate patient on treatment options, clarify concerns, work with pt to overcome any resistance to compliance. .    Objective #B  Patient will identify 3 fears / thoughts that contribute to feeling anxious.  Status: Continued - Date(s): 8/2/23    Intervention(s)  Therapist will  educate patient on treatment options, clarify concerns, work with pt to overcome any resistance to compliance. .    Objective #C  Patient will identify 3 initial signs or symptoms of anxiety.  Status: Continued - Date(s): 8/2/23    Intervention(s)  Therapist will  educate patient on treatment options, clarify concerns, work with pt to overcome any resistance to compliance. .        Patient has reviewed and agreed to the above plan.      Erika Francisco, [facility]  September 7, 2023  Answers for HPI/ROS submitted by the patient on 12/6/2022  TIFFANY 7 TOTAL SCORE:  3  Answers for HPI/ROS submitted by the patient on 3/8/2023  If you checked off any problems, how difficult have these problems made it for you to do your work, take care of things at home, or get along with other people?: Not difficult at all  PHQ9 TOTAL SCORE: 5  Answers for HPI/ROS submitted by the patient on 4/26/2023  If you checked off any problems, how difficult have these problems made it for you to do your work, take care of things at home, or get along with other people?: Somewhat difficult  PHQ9 TOTAL SCORE: 1  Answers for HPI/ROS submitted by the patient on 6/12/2023  If you checked off any problems, how difficult have these problems made it for you to do your work, take care of things at home, or get along with other people?: Not difficult at all  PHQ9 TOTAL SCORE: 1  Answers for HPI/ROS submitted by the patient on 7/18/2023  If you checked off any problems, how difficult have these problems made it for you to do your work, take care of things at home, or get along with other people?: Not difficult at all  PHQ9 TOTAL SCORE: 1Answers submitted by the patient for this visit:  Patient Health Questionnaire (Submitted on 8/27/2023)  If you checked off any problems, how difficult have these problems made it for you to do your work, take care of things at home, or get along with other people?: Not difficult at all  PHQ9 TOTAL SCORE: 1

## 2023-09-07 ENCOUNTER — VIRTUAL VISIT (OUTPATIENT)
Dept: BEHAVIORAL HEALTH | Facility: CLINIC | Age: 68
End: 2023-09-07
Payer: COMMERCIAL

## 2023-09-07 DIAGNOSIS — G47.00 INSOMNIA, UNSPECIFIED TYPE: ICD-10-CM

## 2023-09-07 DIAGNOSIS — F39 MOOD DISORDER (H): ICD-10-CM

## 2023-09-07 DIAGNOSIS — F41.1 GENERALIZED ANXIETY DISORDER: Primary | ICD-10-CM

## 2023-09-07 PROCEDURE — 90834 PSYTX W PT 45 MINUTES: CPT | Mod: 95 | Performed by: SOCIAL WORKER

## 2023-09-18 ENCOUNTER — VIRTUAL VISIT (OUTPATIENT)
Dept: BEHAVIORAL HEALTH | Facility: CLINIC | Age: 68
End: 2023-09-18
Payer: COMMERCIAL

## 2023-09-18 DIAGNOSIS — F41.1 GENERALIZED ANXIETY DISORDER: Primary | ICD-10-CM

## 2023-09-18 DIAGNOSIS — G47.00 INSOMNIA, UNSPECIFIED TYPE: ICD-10-CM

## 2023-09-18 DIAGNOSIS — F39 MOOD DISORDER (H): ICD-10-CM

## 2023-09-18 PROCEDURE — 90832 PSYTX W PT 30 MINUTES: CPT | Mod: 95 | Performed by: SOCIAL WORKER

## 2023-09-18 NOTE — PROGRESS NOTES
[facility]  September 18, 2023      Behavioral Health Clinician Progress Note    Patient Name: Jolynn Haji           Service Type:  Individual      Service Location:   MyChart / Email (patient reached)     Session Start Time: 10:30 am   Session End Time: 10:58 am      Session Length: 16 - 37      Attendees: Patient     Service Modality:  Video Visit:      Provider verified identity through the following two step process.  Patient provided:  Patient is known previously to provider    Telemedicine Visit: The patient's condition can be safely assessed and treated via synchronous audio and visual telemedicine encounter.      Reason for Telemedicine Visit: Patient has requested telehealth visit    Originating Site (Patient Location): Patient's home    Distant Site (Provider Location): [facility]    Consent:  The patient/guardian has verbally consented to: the potential risks and benefits of telemedicine (video visit) versus in person care; bill my insurance or make self-payment for services provided; and responsibility for payment of non-covered services.     Patient would like the video invitation sent by:  My Chart    Mode of Communication:  Video Conference via Amwell    As the provider I attest to compliance with applicable laws and regulations related to telemedicine.    Visit Activities (Refresh list every visit): [facility] Only    Diagnostic Assessment Date: 9/27/22  Treatment Plan Review Date: 11/2/23  See Flowsheets for today's PHQ-9 and TIFFANY-7 results  Previous PHQ-9:       7/11/2023     7:37 PM 7/18/2023    11:30 AM 8/27/2023     6:46 PM   PHQ-9 SCORE   PHQ-9 Total Score MyChart 1 (Minimal depression) 1 (Minimal depression) 1 (Minimal depression)   PHQ-9 Total Score 1 1 1     Previous TIFFANY-7:       5/3/2023     1:18 PM 7/10/2023    10:54 AM 7/11/2023     7:38 PM   TIFFANY-7 SCORE   Total Score 3 (minimal anxiety) 2 (minimal  anxiety) 1 (minimal anxiety)   Total Score 3 2 1       KARINA LEVEL:      8/27/2010     3:00 PM 2/22/2011    11:00 AM   KARINA Score (Last Two)   KARINA Raw Score 51 49   Activation Score 91.6 82.8   KARINA Level 4 4       DATA  Extended Session (60+ minutes): No  Interactive Complexity: No  Crisis: No  [facility] Patient: No    Treatment Objective(s) Addressed in This Session:  Target Behavior(s):  anxiety and depression    Depressed Mood: Increase interest, engagement, and pleasure in doing things  Decrease frequency and intensity of feeling down, depressed, hopeless  Improve quantity and quality of night time sleep / decrease daytime naps  Feel less tired and more energy during the day   Improve diet, appetite, mindful eating, and / or meal planning  Identify negative self-talk and behaviors: challenge core beliefs, myths, and actions  Improve concentration, focus, and mindfulness in daily activities   Feel less fidgety, restless or slow in daily activities / interpersonal interactions  Anxiety: will experience a reduction in anxiety, will develop more effective coping skills to manage anxiety symptoms, will develop healthy cognitive patterns and beliefs and will increase ability to function adaptively    Current Stressors / Issues:  Reports she has been doing pretty well. Having more frequent anxiety but not to the point of not being able to keep using skills. Continues to have headaches. Reports with closing the cabin and end of the summer, causes anxiety to be higher. She has been entertaining more frequently as well. The holidays coming has been somewhat more anxiety provoking. Discussed being mindful of self care and living presently.     9/7/23  Reports finishing up summer projects. Minimal anxiety as of late. Had a more difficult day yesterday with anxiety but it had passed. She was having some feelings of brain fog also. Sleep continues to be very good. Headaches are less frequent/severe but do persist. Discussed desire to  be off meds completely as ultimate goal. Nothing she plans to rush into.       8/21/23  Reporting more good days than bad days. Having more anxiety days since cutting back on medications. She has not been altering her behaviors in life. Preparing for Labor day weekend with family. She is having minimal anxiety. Reinforced skills and continued practice. States she has been reading recommended books with other suggestions for tx of anxiety. She has been enjoying trying different tech as well.     8/16/23  Reports weaning off some of her anxiety medications in attempts to reduce headaches. Has been not been noticing much of a change in her anxiety. She has been working on increasing old activities she had been doing over a year ago prior experiencing high anxiety. Reports one panic attack related to making many decisions and a hectic situation. Attempted to do breathing tech to self sooth. Reports rapid heart rate lasted almost the rest of day despite use of multiple techs. Discussed nature of anxiety and educated on how panic attacks effect on overall system with in the body. Discussed being easy on self and avoiding self  of progress with anxiety.     8/2/23  Reporting spending time with grand children which is always refreshing to her. She has been volunteering for a summer school program. Reporting having a larger groups adults causes her anxiety/headaches. Discussed saying no as part of self care. She has been extending self more to engage in things she likes but has been noting more tiredness and anxiety.       Progress on Treatment Objective(s) / Homework:  Satisfactory progress - ACTION (Actively working towards change); Intervened by reinforcing change plan / affirming steps taken    Motivational Interviewing    MI Intervention: Expressed Empathy/Understanding, Supported Autonomy, Collaboration, Evocation, Open-ended questions, Change talk (evoked) and Reframe     Change Talk Expressed by the Patient:  Desire to change Ability to change Reasons to change Need to change    Provider Response to Change Talk: E - Evoked more info from patient about behavior change, A - Affirmed patient's thoughts, decisions, or attempts at behavior change, R - Reflected patient's change talk and S - Summarized patient's change talk statements      Care Plan review completed: Yes    Medication Review:  No changes to current psychiatric medication(s)    Medication Compliance:  Yes    Changes in Health Issues:   None reported    Chemical Use Review:   Substance Use: Chemical use reviewed, no active concerns identified      Tobacco Use: No current tobacco use.      Assessment: Current Emotional / Mental Status (status of significant symptoms):  Risk status (Self / Other harm or suicidal ideation)  Patient denies a history of suicidal ideation, suicide attempts, self-injurious behavior, homicidal ideation, homicidal behavior and and other safety concerns  Patient denies current fears or concerns for personal safety.  Patient denies current or recent suicidal ideation or behaviors.  Patient denies current or recent homicidal ideation or behaviors.  Patient denies current or recent self injurious behavior or ideation.  Patient denies other safety concerns.  A safety and risk management plan has not been developed at this time, however patient was encouraged to call [place] [phone] should there be a change in any of these risk factors.    Appearance:   Appropriate   Eye Contact:   Good   Psychomotor Behavior: Normal   Attitude:   Cooperative   Orientation:   All  Speech   Rate / Production: Normal    Volume:  Normal   Mood:    Anxious   Affect:    Appropriate   Thought Content:  Clear   Thought Form:  Coherent  Logical   Insight:    Good     Diagnoses:  1. Generalized anxiety disorder    2. Mood disorder (H)    3. Insomnia, unspecified type              Collateral Reports Completed:  Not Applicable    Plan: (Homework, other):  Patient was  given information about behavioral services and encouraged to schedule a follow up appointment with the clinic [facility] in 1 week.  She was also given information about mental health symptoms and treatment options .  CD Recommendations: No indications of CD issues.   Erika Francisco MSW LICSW      ______________________________________________________________________    Integrated Primary Care Behavioral Health Treatment Plan    Patient's Name: Jolynn Haji  YOB: 1955    Date of Creation: 10/5/22  Date Treatment Plan Last Reviewed/Revised: 8/2/23    DSM5 Diagnoses: 300.02 (F41.1) Generalized Anxiety Disorder  Psychosocial / Contextual Factors: Individual Factors high anxiety associated with depressive sx, interfereing with ability to function as she would like.  .  PROMIS (reviewed every 90 days):     Referral / Collaboration:  Was/were discussed and patient will pursue. - individual therapy    Anticipated number of session for this episode of care: 5-8 sessions  Anticipation frequency of session: Weekly  Anticipated Duration of each session: 38-52 minutes  Treatment plan will be reviewed in 90 days or when goals have been changed.       MeasurableTreatment Goal(s) related to diagnosis / functional impairment(s)  Goal 1: Patient will work with provider to manage symptoms    I will know I've met my goal when when less anxious and feeling down.      Objective #A (Patient Action)    Patient will   attend all sessions .  Status: Continued - Date(s): 8/2/23    Intervention(s)  Therapist will teach  educate patient on treatment options, clarify concerns, work with pt to overcome any resistance to compliance. .    Objective #B  Patient will identify 3 fears / thoughts that contribute to feeling anxious.  Status: Continued - Date(s): 8/2/23    Intervention(s)  Therapist will  educate patient on treatment options, clarify concerns, work with pt to overcome any resistance to compliance. .    Objective #C  Patient will  identify 3 initial signs or symptoms of anxiety.  Status: Continued - Date(s): 8/2/23    Intervention(s)  Therapist will  educate patient on treatment options, clarify concerns, work with pt to overcome any resistance to compliance. .        Patient has reviewed and agreed to the above plan.      Erika Francisco [facility]  September 18, 2023  Answers for HPI/ROS submitted by the patient on 12/6/2022  TIFFANY 7 TOTAL SCORE: 3  Answers for HPI/ROS submitted by the patient on 3/8/2023  If you checked off any problems, how difficult have these problems made it for you to do your work, take care of things at home, or get along with other people?: Not difficult at all  PHQ9 TOTAL SCORE: 5  Answers for HPI/ROS submitted by the patient on 4/26/2023  If you checked off any problems, how difficult have these problems made it for you to do your work, take care of things at home, or get along with other people?: Somewhat difficult  PHQ9 TOTAL SCORE: 1  Answers for HPI/ROS submitted by the patient on 6/12/2023  If you checked off any problems, how difficult have these problems made it for you to do your work, take care of things at home, or get along with other people?: Not difficult at all  PHQ9 TOTAL SCORE: 1  Answers for HPI/ROS submitted by the patient on 7/18/2023  If you checked off any problems, how difficult have these problems made it for you to do your work, take care of things at home, or get along with other people?: Not difficult at all  PHQ9 TOTAL SCORE: 1Answers submitted by the patient for this visit:  Patient Health Questionnaire (Submitted on 8/27/2023)  If you checked off any problems, how difficult have these problems made it for you to do your work, take care of things at home, or get along with other people?: Not difficult at all  PHQ9 TOTAL SCORE: 1

## 2023-09-22 NOTE — PROGRESS NOTES
[facility] Integrated Behavioral Health  September 26, 2023      Behavioral Health Clinician Progress Note    Patient Name: Jolynn Haji           Service Type:  Individual      Service Location:   MyChart / Email (patient reached)     Session Start Time: 10:00 am   Session End Time: 10:35 am      Session Length: 16 - 37      Attendees: Patient     Service Modality:  Video Visit:      Provider verified identity through the following two step process.  Patient provided:  Patient is known previously to provider    Telemedicine Visit: The patient's condition can be safely assessed and treated via synchronous audio and visual telemedicine encounter.      Reason for Telemedicine Visit: Patient has requested telehealth visit    Originating Site (Patient Location): Patient's home    Distant Site (Provider Location): [facility]    Consent:  The patient/guardian has verbally consented to: the potential risks and benefits of telemedicine (video visit) versus in person care; bill my insurance or make self-payment for services provided; and responsibility for payment of non-covered services.     Patient would like the video invitation sent by:  My Chart    Mode of Communication:  Video Conference via Amwell    As the provider I attest to compliance with applicable laws and regulations related to telemedicine.    Visit Activities (Refresh list every visit): [facility] Only    Diagnostic Assessment Date: 9/27/22  Treatment Plan Review Date: 11/2/23  See Flowsheets for today's PHQ-9 and TIFFANY-7 results  Previous PHQ-9:       7/11/2023     7:37 PM 7/18/2023    11:30 AM 8/27/2023     6:46 PM   PHQ-9 SCORE   PHQ-9 Total Score MyChart 1 (Minimal depression) 1 (Minimal depression) 1 (Minimal depression)   PHQ-9 Total Score 1 1 1     Previous TIFFANY-7:       5/3/2023     1:18 PM 7/10/2023    10:54 AM 7/11/2023     7:38 PM   TIFFANY-7 SCORE   Total Score 3 (minimal anxiety) 2 (minimal  anxiety) 1 (minimal anxiety)   Total Score 3 2 1       KARINA LEVEL:      8/27/2010     3:00 PM 2/22/2011    11:00 AM   KARINA Score (Last Two)   KARINA Raw Score 51 49   Activation Score 91.6 82.8   KARINA Level 4 4       DATA  Extended Session (60+ minutes): No  Interactive Complexity: No  Crisis: No  [facility] Patient: No    Treatment Objective(s) Addressed in This Session:  Target Behavior(s):  anxiety and depression    Depressed Mood: Increase interest, engagement, and pleasure in doing things  Decrease frequency and intensity of feeling down, depressed, hopeless  Improve quantity and quality of night time sleep / decrease daytime naps  Feel less tired and more energy during the day   Improve diet, appetite, mindful eating, and / or meal planning  Identify negative self-talk and behaviors: challenge core beliefs, myths, and actions  Improve concentration, focus, and mindfulness in daily activities   Feel less fidgety, restless or slow in daily activities / interpersonal interactions  Anxiety: will experience a reduction in anxiety, will develop more effective coping skills to manage anxiety symptoms, will develop healthy cognitive patterns and beliefs and will increase ability to function adaptively    Current Stressors / Issues:  Anxiety has been pretty good for the past week. Managing minor stressors better. Continues to have headaches frequently. Sleep continues to be good. Had one day of depressed mood but passed. Experienced some anxiety of possibly depressed mood coming on. Processed feels around this. Discussed self care and taking time for self.     9/18/23  Reports she has been doing pretty well. Having more frequent anxiety but not to the point of not being able to keep using skills. Continues to have headaches. Reports with closing the cabin and end of the summer, causes anxiety to be higher. She has been entertaining more frequently as well. The holidays coming has been somewhat more anxiety provoking. Discussed  being mindful of self care and living presently.     9/7/23  Reports finishing up summer projects. Minimal anxiety as of late. Had a more difficult day yesterday with anxiety but it had passed. She was having some feelings of brain fog also. Sleep continues to be very good. Headaches are less frequent/severe but do persist. Discussed desire to be off meds completely as ultimate goal. Nothing she plans to rush into.       8/21/23  Reporting more good days than bad days. Having more anxiety days since cutting back on medications. She has not been altering her behaviors in life. Preparing for Labor day weekend with family. She is having minimal anxiety. Reinforced skills and continued practice. States she has been reading recommended books with other suggestions for tx of anxiety. She has been enjoying trying different tech as well.     8/16/23  Reports weaning off some of her anxiety medications in attempts to reduce headaches. Has been not been noticing much of a change in her anxiety. She has been working on increasing old activities she had been doing over a year ago prior experiencing high anxiety. Reports one panic attack related to making many decisions and a hectic situation. Attempted to do breathing tech to self sooth. Reports rapid heart rate lasted almost the rest of day despite use of multiple techs. Discussed nature of anxiety and educated on how panic attacks effect on overall system with in the body. Discussed being easy on self and avoiding self  of progress with anxiety.     8/2/23  Reporting spending time with grand children which is always refreshing to her. She has been volunteering for a summer school program. Reporting having a larger groups adults causes her anxiety/headaches. Discussed saying no as part of self care. She has been extending self more to engage in things she likes but has been noting more tiredness and anxiety.       Progress on Treatment Objective(s) /  Homework:  Satisfactory progress - ACTION (Actively working towards change); Intervened by reinforcing change plan / affirming steps taken    Motivational Interviewing    MI Intervention: Expressed Empathy/Understanding, Supported Autonomy, Collaboration, Evocation, Open-ended questions, Change talk (evoked) and Reframe     Change Talk Expressed by the Patient: Desire to change Ability to change Reasons to change Need to change    Provider Response to Change Talk: E - Evoked more info from patient about behavior change, A - Affirmed patient's thoughts, decisions, or attempts at behavior change, R - Reflected patient's change talk and S - Summarized patient's change talk statements      Care Plan review completed: Yes    Medication Review:  No changes to current psychiatric medication(s)    Medication Compliance:  Yes    Changes in Health Issues:   None reported    Chemical Use Review:   Substance Use: Chemical use reviewed, no active concerns identified      Tobacco Use: No current tobacco use.      Assessment: Current Emotional / Mental Status (status of significant symptoms):  Risk status (Self / Other harm or suicidal ideation)  Patient denies a history of suicidal ideation, suicide attempts, self-injurious behavior, homicidal ideation, homicidal behavior and and other safety concerns  Patient denies current fears or concerns for personal safety.  Patient denies current or recent suicidal ideation or behaviors.  Patient denies current or recent homicidal ideation or behaviors.  Patient denies current or recent self injurious behavior or ideation.  Patient denies other safety concerns.  A safety and risk management plan has not been developed at this time, however patient was encouraged to call [facility] should there be a change in any of these risk factors.    Appearance:   Appropriate   Eye Contact:   Good   Psychomotor Behavior: Normal   Attitude:   Cooperative   Orientation:   All  Speech   Rate /  Production: Normal    Volume:  Normal   Mood:    Anxious   Affect:    Appropriate   Thought Content:  Clear   Thought Form:  Coherent  Logical   Insight:    Good     Diagnoses:  1. Generalized anxiety disorder    2. Mood disorder (H)    3. Insomnia, unspecified type          Collateral Reports Completed:  Not Applicable    Plan: (Homework, other):  Patient was given information about behavioral services and encouraged to schedule a follow up appointment with the clinic [facility] in 1 week.  She was also given information about mental health symptoms and treatment options .  CD Recommendations: No indications of CD issues.   Erika Francisco MSW LICSW      ______________________________________________________________________    Integrated Primary Care Behavioral Health Treatment Plan    Patient's Name: Jolynn Haji  YOB: 1955    Date of Creation: 10/5/22  Date Treatment Plan Last Reviewed/Revised: 8/2/23    DSM5 Diagnoses: 300.02 (F41.1) Generalized Anxiety Disorder  Psychosocial / Contextual Factors: Individual Factors high anxiety associated with depressive sx, interfereing with ability to function as she would like.  .  PROMIS (reviewed every 90 days):     Referral / Collaboration:  Was/were discussed and patient will pursue. - individual therapy    Anticipated number of session for this episode of care: 5-8 sessions  Anticipation frequency of session: Weekly  Anticipated Duration of each session: 38-52 minutes  Treatment plan will be reviewed in 90 days or when goals have been changed.       MeasurableTreatment Goal(s) related to diagnosis / functional impairment(s)  Goal 1: Patient will work with provider to manage symptoms    I will know I've met my goal when when less anxious and feeling down.      Objective #A (Patient Action)    Patient will   attend all sessions .  Status: Continued - Date(s): 8/2/23    Intervention(s)  Therapist will teach  educate patient on treatment options, clarify concerns, work  with pt to overcome any resistance to compliance. .    Objective #B  Patient will identify 3 fears / thoughts that contribute to feeling anxious.  Status: Continued - Date(s): 8/2/23    Intervention(s)  Therapist will  educate patient on treatment options, clarify concerns, work with pt to overcome any resistance to compliance. .    Objective #C  Patient will identify 3 initial signs or symptoms of anxiety.  Status: Continued - Date(s): 8/2/23    Intervention(s)  Therapist will  educate patient on treatment options, clarify concerns, work with pt to overcome any resistance to compliance. .        Patient has reviewed and agreed to the above plan.      Erika Francisco, [facility]  September 26, 2023  Answers for HPI/ROS submitted by the patient on 12/6/2022  TIFFANY 7 TOTAL SCORE: 3  Answers for HPI/ROS submitted by the patient on 3/8/2023  If you checked off any problems, how difficult have these problems made it for you to do your work, take care of things at home, or get along with other people?: Not difficult at all  PHQ9 TOTAL SCORE: 5  Answers for HPI/ROS submitted by the patient on 4/26/2023  If you checked off any problems, how difficult have these problems made it for you to do your work, take care of things at home, or get along with other people?: Somewhat difficult  PHQ9 TOTAL SCORE: 1  Answers for HPI/ROS submitted by the patient on 6/12/2023  If you checked off any problems, how difficult have these problems made it for you to do your work, take care of things at home, or get along with other people?: Not difficult at all  PHQ9 TOTAL SCORE: 1  Answers for HPI/ROS submitted by the patient on 7/18/2023  If you checked off any problems, how difficult have these problems made it for you to do your work, take care of things at home, or get along with other people?: Not difficult at all  PHQ9 TOTAL SCORE: 1Answers submitted by the patient for this visit:  Patient Health Questionnaire (Submitted on 8/27/2023)  If you  checked off any problems, how difficult have these problems made it for you to do your work, take care of things at home, or get along with other people?: Not difficult at all  PHQ9 TOTAL SCORE: 1

## 2023-09-25 ASSESSMENT — ANXIETY QUESTIONNAIRES
GAD7 TOTAL SCORE: 1
7. FEELING AFRAID AS IF SOMETHING AWFUL MIGHT HAPPEN: NOT AT ALL
1. FEELING NERVOUS, ANXIOUS, OR ON EDGE: SEVERAL DAYS
IF YOU CHECKED OFF ANY PROBLEMS ON THIS QUESTIONNAIRE, HOW DIFFICULT HAVE THESE PROBLEMS MADE IT FOR YOU TO DO YOUR WORK, TAKE CARE OF THINGS AT HOME, OR GET ALONG WITH OTHER PEOPLE: NOT DIFFICULT AT ALL
4. TROUBLE RELAXING: NOT AT ALL
2. NOT BEING ABLE TO STOP OR CONTROL WORRYING: NOT AT ALL
6. BECOMING EASILY ANNOYED OR IRRITABLE: NOT AT ALL
3. WORRYING TOO MUCH ABOUT DIFFERENT THINGS: NOT AT ALL
5. BEING SO RESTLESS THAT IT IS HARD TO SIT STILL: NOT AT ALL

## 2023-09-26 ENCOUNTER — VIRTUAL VISIT (OUTPATIENT)
Dept: BEHAVIORAL HEALTH | Facility: CLINIC | Age: 68
End: 2023-09-26
Payer: COMMERCIAL

## 2023-09-26 DIAGNOSIS — F41.1 GENERALIZED ANXIETY DISORDER: Primary | ICD-10-CM

## 2023-09-26 DIAGNOSIS — G47.00 INSOMNIA, UNSPECIFIED TYPE: ICD-10-CM

## 2023-09-26 DIAGNOSIS — F39 MOOD DISORDER (H): ICD-10-CM

## 2023-09-26 PROCEDURE — 90832 PSYTX W PT 30 MINUTES: CPT | Mod: 95 | Performed by: SOCIAL WORKER

## 2023-09-27 ENCOUNTER — OFFICE VISIT (OUTPATIENT)
Dept: FAMILY MEDICINE | Facility: CLINIC | Age: 68
End: 2023-09-27
Payer: COMMERCIAL

## 2023-09-27 VITALS
HEIGHT: 68 IN | BODY MASS INDEX: 21.82 KG/M2 | TEMPERATURE: 98.7 F | SYSTOLIC BLOOD PRESSURE: 110 MMHG | OXYGEN SATURATION: 98 % | WEIGHT: 144 LBS | DIASTOLIC BLOOD PRESSURE: 70 MMHG | RESPIRATION RATE: 16 BRPM | HEART RATE: 85 BPM

## 2023-09-27 DIAGNOSIS — I10 ESSENTIAL HYPERTENSION WITH GOAL BLOOD PRESSURE LESS THAN 140/90: ICD-10-CM

## 2023-09-27 DIAGNOSIS — E78.5 HYPERLIPIDEMIA LDL GOAL <130: ICD-10-CM

## 2023-09-27 DIAGNOSIS — Z23 NEEDS FLU SHOT: ICD-10-CM

## 2023-09-27 DIAGNOSIS — F41.1 GAD (GENERALIZED ANXIETY DISORDER): ICD-10-CM

## 2023-09-27 DIAGNOSIS — R60.0 BILATERAL LOWER EXTREMITY EDEMA: ICD-10-CM

## 2023-09-27 DIAGNOSIS — R25.1 TREMOR: ICD-10-CM

## 2023-09-27 DIAGNOSIS — F43.23 ADJUSTMENT DISORDER WITH MIXED ANXIETY AND DEPRESSED MOOD: Primary | ICD-10-CM

## 2023-09-27 PROCEDURE — G0008 ADMIN INFLUENZA VIRUS VAC: HCPCS | Performed by: FAMILY MEDICINE

## 2023-09-27 PROCEDURE — 90662 IIV NO PRSV INCREASED AG IM: CPT | Performed by: FAMILY MEDICINE

## 2023-09-27 PROCEDURE — 36415 COLL VENOUS BLD VENIPUNCTURE: CPT | Performed by: FAMILY MEDICINE

## 2023-09-27 PROCEDURE — 99215 OFFICE O/P EST HI 40 MIN: CPT | Mod: 25 | Performed by: FAMILY MEDICINE

## 2023-09-27 PROCEDURE — 80053 COMPREHEN METABOLIC PANEL: CPT | Performed by: FAMILY MEDICINE

## 2023-09-27 RX ORDER — PROPRANOLOL HYDROCHLORIDE 20 MG/1
20 TABLET ORAL 2 TIMES DAILY
Qty: 180 TABLET | Refills: 1 | Status: SHIPPED | OUTPATIENT
Start: 2023-09-27 | End: 2023-10-10 | Stop reason: DRUGHIGH

## 2023-09-27 ASSESSMENT — ANXIETY QUESTIONNAIRES
2. NOT BEING ABLE TO STOP OR CONTROL WORRYING: NOT AT ALL
5. BEING SO RESTLESS THAT IT IS HARD TO SIT STILL: NOT AT ALL
7. FEELING AFRAID AS IF SOMETHING AWFUL MIGHT HAPPEN: NOT AT ALL
3. WORRYING TOO MUCH ABOUT DIFFERENT THINGS: NOT AT ALL
1. FEELING NERVOUS, ANXIOUS, OR ON EDGE: SEVERAL DAYS
IF YOU CHECKED OFF ANY PROBLEMS ON THIS QUESTIONNAIRE, HOW DIFFICULT HAVE THESE PROBLEMS MADE IT FOR YOU TO DO YOUR WORK, TAKE CARE OF THINGS AT HOME, OR GET ALONG WITH OTHER PEOPLE: NOT DIFFICULT AT ALL
GAD7 TOTAL SCORE: 1
6. BECOMING EASILY ANNOYED OR IRRITABLE: NOT AT ALL
GAD7 TOTAL SCORE: 1
4. TROUBLE RELAXING: NOT AT ALL

## 2023-09-27 ASSESSMENT — PATIENT HEALTH QUESTIONNAIRE - PHQ9
SUM OF ALL RESPONSES TO PHQ QUESTIONS 1-9: 0
SUM OF ALL RESPONSES TO PHQ QUESTIONS 1-9: 0

## 2023-09-27 NOTE — PATIENT INSTRUCTIONS
[facility]  [address] [place] [address]  Office: [phone]   Fax:    [phone]     She will talk with Dr. Pompa at her appt next week to perhaps change/transition off the venlafaxine secpndary to her headaches.    Venous stasis and lower extremity edema: Restrict sodium to less than 2 grams daily = 2000mg daily.   1 teaspoon of table salt has 2300mg of sodium. - discussed also compression hose, need for walking for exercise, calf raises.      Continue to see your therapist - Erika regularly.      Recheck for your annual physical on 2/5/2024- Please come in fasting :  nothing to eat for at least 8 , preferably 12 hours ahead of appointment. Please do come in well hydrated though - ok to  have water, black coffee or plain tea, BUT NO creamers or sweeteners of any kind, please.      Ok to arrive at 0840 per Dr. LIND     Future Appointments 9/27/2023 - 3/25/2024        Date Visit Type Length Department Provider     10/10/2023 11:30 AM [facility]ULT [facility] RETURN 30 min [facility] Judy Pompa DO    Location Instructions:     [facility] has 2 buildings on its campus. Please visit us at [address], [place] and enter the building with the numbers [phone] on it.               10/12/2023 10:30 AM [facility] RETURN 60 min  BEHAVIORAL HEALTH Erika Francisco, [facility]    Location Instructions:     Your appointment is at [facility] located in the [facility] at [address] [place] [address]. Please check in at the  in [address].              12/6/2023  5:00 PM OFFICE VISIT 40 min  FAMILY PRACTICE Helga Ibarra MD    Location Instructions:     [facility] is located at [address], [address] Free parking is available; access the lot by turning north from [address] onto [address], then west onto [address].              2/5/2024  8:40  "AM ANNUAL WELLNESS 40 min  FAMILY PRACTICE Helga Ibarra MD    Location Instructions:     [facility] is located at [address], [address]. Free parking is available; access the lot by turning north from [address] onto [address], then west onto [address].              2/22/2024 11:15 AM RETURN 15 min [facility] BeautySheldon villanueva MD    Location Instructions:     [address] [place] [id number]                      Please, call our clinic or go to the ER immediately if signs or symptoms worsen or fail to improve as anticipated.     Thank you so much or choosing [facility]  for your Health Care. It was a pleasure seeing you at your visit today! Please contact us with any questions or concerns you may have.                   Helga Ibarra MD                              To reach your [facility] care team after hours call:   [phone] press #[phone] \"to speak with your care team\".  This will get you to our clinic instead of routing to central [facility]  scheduling.     PLEASE NOTE OUR HOURS HAVE CHANGED secondary to COVID-19 coronavirus pandemic, as we are trying to minimize patient exposure to the virus,  which is now widespread in the [facility].  These hours may change with very little notice.  We apologize for any inconvenience.       Our current clinic hours are:          Monday- Thursday   7:00am - 6:00pm  in person.      Friday  7:00am- 5:00pm                       Saturday and Sunday : Closed to in person and virtual visits        We have telephone and virtual visit times available between    7:00am - 6pm on Monday-Friday as well.                                                Phone:  [phone]      Our pharmacy hours: Monday through Friday 8:00am to 5:00pm                        Saturday - 9:00 am to 12 noon       Sunday : Closed.             "  Phone:  [phone]              ###  Please note: at this time we are not accepting any walk-in visits. ###      There is also information available at our web site:  [email or web address]    If your provider ordered any lab tests and you do not receive the results within 10 business days, please call the clinic.    If you need a medication refill please contact your pharmacy.  Please allow 3 business days for your refill to be completed.    Our clinic offers telephone visits and e visits.  Please ask one of your team members to explain more.      Use [facility] (secure email communication and access to your chart) to send your primary care provider a message or make an appointment. Ask someone on your Team how to sign up for [facility].

## 2023-09-27 NOTE — PROGRESS NOTES
Assessment & Plan :       ICD-10-CM    1. Adjustment disorder with mixed anxiety and depressed mood- in partial remission   at this time on Venlafaxine  F43.23 Comprehensive metabolic panel (BMP + Alb, Alk Phos, ALT, AST, Total. Bili, TP)     Comprehensive metabolic panel (BMP + Alb, Alk Phos, ALT, AST, Total. Bili, TP)      2. Essential hypertension with goal blood pressure less than 140/90  I10 Comprehensive metabolic panel (BMP + Alb, Alk Phos, ALT, AST, Total. Bili, TP)     Comprehensive metabolic panel (BMP + Alb, Alk Phos, ALT, AST, Total. Bili, TP)      3. Hyperlipidemia LDL goal <130  E78.5 Comprehensive metabolic panel (BMP + Alb, Alk Phos, ALT, AST, Total. Bili, TP)     Comprehensive metabolic panel (BMP + Alb, Alk Phos, ALT, AST, Total. Bili, TP)      4. Needs flu shot  Z23 INFLUENZA VACCINE 65+ (FLUZONE HD)      5. TIFFANY (generalized anxiety disorder)  F41.1 propranolol (INDERAL) 20 MG tablet      6. Tremor  R25.1 propranolol (INDERAL) 20 MG tablet      7. Bilateral lower extremity edema- trace pitting edema distal LE to mid pretibial areas Left > right  R60.0         She will talk with Dr. Pompa at her appt next week to perhaps change/transition off the venlafaxine secondary to her headaches.      Venous stasis and lower extremity edema: Restrict sodium to less than 2 grams daily = 2000mg daily.   1 teaspoon of table salt has 2300mg of sodium.  Look for hidden sodium in other foods - see AVS.     Return in about 10 weeks (around 12/6/2023) for depression, anxiety an recheck leg swelling , w/ Dr. SUE for 40 minute appointment, in person.    Future Appointments 9/27/2023 - 3/25/2024        Date Visit Type Length Department Provider     10/10/2023 11:30 AM [facility] ADULT PSYCHIATRY RETURN 30 min [facility] Judy Pompa DO    Location Instructions:     [facility] has 2 buildings on its campus. Please visit us at [address] [place], [place] [address] and enter the building with the  numbers [phone] on it.               10/12/2023 10:30 AM [facility] RETURN 60 min  BEHAVIORAL HEALTH Erika Francisco, DINESH    Location Instructions:     Your appointment is at [facility] located in the [facility] at [address] [place] [address]. Please check in at the  in [address].              12/6/2023  5:00 PM OFFICE VISIT 40 min [facility] Helga Ibarra MD    Location Instructions:     [facility] is located at [address], along [address] Free parking is available; access the lot by turning north from [address]  onto [address], then west onto [address].              2/5/2024  8:40 AM ANNUAL WELLNESS 40 min  FAMILY PRACTICE Helga Ibarra MD    Location Instructions:     [facility] is located at [address], [address] Free parking is available; access the lot by turning north from [address]  onto [address], then west onto [address].              2/22/2024 11:15 AM RETURN 15 min [facility] DERMATOLOGY Sheldon Rogers MD    Location Instructions:     [address] [id number]                      Continue to see your therapist - Erika regularly.            Ordering of each unique test  Prescription drug management  44 minutes spent by me on the date of the encounter doing chart review, history and exam, documentation and further activities per the note - all face to face time.                 Helga Ibarra MD  [facility]    Chanelle Tristan is a 68 year old, presenting for the following health issues:  Recheck Medication      9/27/2023     4:58 PM   Additional Questions   Roomed by christi voss       History of Present Illness       Mental Health Follow-up:  Patient presents to follow-up on Anxiety.    Patient's anxiety since last visit has been:  Good  The patient is having  "other symptoms associated with anxiety.  Any significant life events: No  Patient is feeling anxious or having panic attacks.  Patient has no concerns about alcohol or drug use.    Headaches:   Since the patient's last clinic visit, headaches are: worsened  The patient is getting headaches:  Several days a week  She is able to do normal daily activities when she has a migraine.  The patient is taking the following rescue/relief medications:  Tylenol   Patient states \"The relief is inconsistent\" from the rescue/relief medications.   The patient is taking the following medications to prevent migraines:  No medications to prevent migraines  In the past 4 weeks, the patient has gone to an Urgent Care or Emergency Room 0 times times due to headaches.    She eats 2-3 servings of fruits and vegetables daily.She consumes 0 sweetened beverage(s) daily.She exercises with enough effort to increase her heart rate 20 to 29 minutes per day.  She exercises with enough effort to increase her heart rate 3 or less days per week.   She is taking medications regularly.     Dr. Pompa, her psychiatrist has cut her meds back again secondary to her headaches.  This week HA's are better - feeling better. She last ate about 15 min ago.  She feels they are more anxiety related - tension-type headaches.  If she is stressed and/or has a lot to do, she'll get a headache most often at those times. Headaches have been less severe, but not necessarily less frequent on 112.5 mg daily compared with 150mg daily.      She has gained a little weight and feels better. Sleeping really well - about 8 hrs/night. Taking trazodone 25mg nightly for sleep- takes 1/2 of a 50mg tab.     Wt Readings from Last 5 Encounters:   09/27/23 65.3 kg (144 lb)   08/25/23 64 kg (141 lb)   07/12/23 64 kg (141 lb)   05/05/23 64.1 kg (141 lb 4.8 oz)   03/24/23 62.1 kg (137 lb)     Walking dog , Casper , almost daily.  She doesn't like to take Advil or tylenol - they make her " "feel like her head is heavy, she describes it as \"stone-headed.\"     No new symptoms to her headaches - chronic for years in her youth and hormone related and worse since being on the venlafaxine in general.  changes in speech, swallowing, hearing, coordination  or vision.  No numbness, tingling, or weakness in upper or lower extremities. No bowel or bladder control problems.     Her 's pancreatic lesions have been shrinking and he's feeling great - doing CT scans every 6 months.  Never had to do chemotherapy.       Social History     Tobacco Use    Smoking status: Never    Smokeless tobacco: Never   Vaping Use    Vaping Use: Never used   Substance Use Topics    Alcohol use: Yes     Alcohol/week: 0.0 - 1.0 standard drinks of alcohol     Comment: 2 per month    Drug use: No     Comment: no herbal meds either         7/18/2023    11:30 AM 8/27/2023     6:46 PM 9/27/2023     4:53 PM   PHQ   PHQ-9 Total Score 1 1 0   Q9: Thoughts of better off dead/self-harm past 2 weeks Not at all Not at all Not at all         7/11/2023     7:38 PM 9/25/2023     3:40 PM 9/27/2023     4:53 PM   TIFFANY-7 SCORE   Total Score 1 (minimal anxiety) 1 (minimal anxiety) 1 (minimal anxiety)   Total Score 1 1 1         9/27/2023     4:53 PM   Last PHQ-9   1.  Little interest or pleasure in doing things 0   2.  Feeling down, depressed, or hopeless 0   3.  Trouble falling or staying asleep, or sleeping too much 0   4.  Feeling tired or having little energy 0   5.  Poor appetite or overeating 0   6.  Feeling bad about yourself 0   7.  Trouble concentrating 0   8.  Moving slowly or restless 0   Q9: Thoughts of better off dead/self-harm past 2 weeks 0   PHQ-9 Total Score 0         9/27/2023     4:53 PM   TIFFANY-7    1. Feeling nervous, anxious, or on edge 1   2. Not being able to stop or control worrying 0   3. Worrying too much about different things 0   4. Trouble relaxing 0   5. Being so restless that it is hard to sit still 0   6. Becoming easily " "annoyed or irritable 0   7. Feeling afraid, as if something awful might happen 0   TIFFANY-7 Total Score 1   If you checked any problems, how difficult have they made it for you to do your work, take care of things at home, or get along with other people? Not difficult at all       Suicide Assessment Five-step Evaluation and Treatment (SAFE-T)      Review of Systems   Constitutional, HEENT, cardiovascular, pulmonary, GI, , musculoskeletal, neuro, skin, endocrine and psych systems are negative, except as otherwise noted.      Objective    /70   Pulse 85   Temp 98.7  F (37.1  C)   Resp 16   Ht 1.717 m (5' 7.6\")   Wt 65.3 kg (144 lb)   LMP 06/28/2001   SpO2 98%   BMI 22.16 kg/m    Body mass index is 22.16 kg/m .  Physical Exam   GENERAL: healthy, alert and no distress  HENT: ear canals and TM's normal, nose and mouth without ulcers or lesions  RESP: lungs clear to auscultation - no rales, rhonchi or wheezes  CV: regular rate and rhythm, normal S1 S2, no S3 or S4, no murmur, click or rub, no peripheral edema and peripheral pulses strong  ABDOMEN: soft, nontender, no hepatosplenomegaly, no masses and bowel sounds normal  MS: no gross musculoskeletal defects noted, trace pitting  edema left > right  - started about 2 weeks ago.    SKIN: no suspicious lesions or rashes- has some healing areas where dermatology has taken off some skin lesions. Mild venous stasis changes in her distal lower extremities - admits to eating a lot of pickles lately and salting her food/ corn on the cob a bit more.   NEURO: Normal strength and tone, mentation intact and speech normal  PSYCH: mentation appears normal, affect normal/bright; Alert and oriented. No acute distress. Appears well-groomed and casually dressed. Affect is normal, not particularly depressed. In good humor and laughs appropriately. Not particularly anxious. No evidence of psychosis.                       "

## 2023-09-28 LAB
ALBUMIN SERPL BCG-MCNC: 4.1 G/DL (ref 3.5–5.2)
ALP SERPL-CCNC: 83 U/L (ref 35–104)
ALT SERPL W P-5'-P-CCNC: 15 U/L (ref 0–50)
ANION GAP SERPL CALCULATED.3IONS-SCNC: 10 MMOL/L (ref 7–15)
AST SERPL W P-5'-P-CCNC: 19 U/L (ref 0–45)
BILIRUB SERPL-MCNC: 0.2 MG/DL
BUN SERPL-MCNC: 23.6 MG/DL (ref 8–23)
CALCIUM SERPL-MCNC: 9.7 MG/DL (ref 8.8–10.2)
CHLORIDE SERPL-SCNC: 104 MMOL/L (ref 98–107)
CREAT SERPL-MCNC: 0.78 MG/DL (ref 0.51–0.95)
EGFRCR SERPLBLD CKD-EPI 2021: 82 ML/MIN/1.73M2
GLUCOSE SERPL-MCNC: 97 MG/DL (ref 70–99)
HCO3 SERPL-SCNC: 26 MMOL/L (ref 22–29)
POTASSIUM SERPL-SCNC: 4.6 MMOL/L (ref 3.4–5.3)
PROT SERPL-MCNC: 6.7 G/DL (ref 6.4–8.3)
SODIUM SERPL-SCNC: 140 MMOL/L (ref 135–145)

## 2023-10-09 NOTE — PROGRESS NOTES
[facility]  October 12, 2023      Behavioral Health Clinician Progress Note    Patient Name: Jolynn Haji           Service Type:  Individual      Service Location:   MyChart / Email (patient reached)     Session Start Time: 10:30 am   Session End Time: 11:03 am      Session Length: 16 - 37      Attendees: Patient     Service Modality:  Video Visit:      Provider verified identity through the following two step process.  Patient provided:  Patient is known previously to provider    Telemedicine Visit: The patient's condition can be safely assessed and treated via synchronous audio and visual telemedicine encounter.      Reason for Telemedicine Visit: Patient has requested telehealth visit    Originating Site (Patient Location): Patient's home    Distant Site (Provider Location): [facility]    Consent:  The patient/guardian has verbally consented to: the potential risks and benefits of telemedicine (video visit) versus in person care; bill my insurance or make self-payment for services provided; and responsibility for payment of non-covered services.     Patient would like the video invitation sent by:  My Chart    Mode of Communication:  Video Conference via [employer]well    As the provider I attest to compliance with applicable laws and regulations related to telemedicine.    Visit Activities (Refresh list every visit): [facility] Only    Diagnostic Assessment Date: 9/27/22  Treatment Plan Review Date: 11/2/23  See Flowsheets for today's PHQ-9 and TIFFANY-7 results  Previous PHQ-9:       7/18/2023    11:30 AM 8/27/2023     6:46 PM 9/27/2023     4:53 PM   PHQ-9 SCORE   PHQ-9 Total Score MyChart 1 (Minimal depression) 1 (Minimal depression) 0   PHQ-9 Total Score 1 1 0     Previous TIFFANY-7:       7/11/2023     7:38 PM 9/25/2023     3:40 PM 9/27/2023     4:53 PM   TIFFANY-7 SCORE   Total Score 1 (minimal anxiety) 1 (minimal anxiety) 1 (minimal  anxiety)   Total Score 1 1 1       KARINA LEVEL:      8/27/2010     3:00 PM 2/22/2011    11:00 AM   KARINA Score (Last Two)   KARINA Raw Score 51 49   Activation Score 91.6 82.8   KARINA Level 4 4       DATA  Extended Session (60+ minutes): No  Interactive Complexity: No  Crisis: No  [facility] Patient: No    Treatment Objective(s) Addressed in This Session:  Target Behavior(s):  anxiety and depression    Depressed Mood: Increase interest, engagement, and pleasure in doing things  Decrease frequency and intensity of feeling down, depressed, hopeless  Improve quantity and quality of night time sleep / decrease daytime naps  Feel less tired and more energy during the day   Improve diet, appetite, mindful eating, and / or meal planning  Identify negative self-talk and behaviors: challenge core beliefs, myths, and actions  Improve concentration, focus, and mindfulness in daily activities   Feel less fidgety, restless or slow in daily activities / interpersonal interactions  Anxiety: will experience a reduction in anxiety, will develop more effective coping skills to manage anxiety symptoms, will develop healthy cognitive patterns and beliefs and will increase ability to function adaptively    Current Stressors / Issues:  Reports she is doing well. Discussed appts with providers going well. Has continued to have headaches. Does not want to make med changes around the holiday. Discussed planning a party for friends at her house.     9/26/23  Anxiety has been pretty good for the past week. Managing minor stressors better. Continues to have headaches frequently. Sleep continues to be good. Had one day of depressed mood but passed. Experienced some anxiety of possibly depressed mood coming on. Processed feels around this. Discussed self care and taking time for self.     9/18/23  Reports she has been doing pretty well. Having more frequent anxiety but not to the point of not being able to keep using skills. Continues to have headaches. Reports  with closing the cabin and end of the summer, causes anxiety to be higher. She has been entertaining more frequently as well. The holidays coming has been somewhat more anxiety provoking. Discussed being mindful of self care and living presently.     9/7/23  Reports finishing up summer projects. Minimal anxiety as of late. Had a more difficult day yesterday with anxiety but it had passed. She was having some feelings of brain fog also. Sleep continues to be very good. Headaches are less frequent/severe but do persist. Discussed desire to be off meds completely as ultimate goal. Nothing she plans to rush into.       8/21/23  Reporting more good days than bad days. Having more anxiety days since cutting back on medications. She has not been altering her behaviors in life. Preparing for Labor day weekend with family. She is having minimal anxiety. Reinforced skills and continued practice. States she has been reading recommended books with other suggestions for tx of anxiety. She has been enjoying trying different tech as well.     8/16/23  Reports weaning off some of her anxiety medications in attempts to reduce headaches. Has been not been noticing much of a change in her anxiety. She has been working on increasing old activities she had been doing over a year ago prior experiencing high anxiety. Reports one panic attack related to making many decisions and a hectic situation. Attempted to do breathing tech to self sooth. Reports rapid heart rate lasted almost the rest of day despite use of multiple techs. Discussed nature of anxiety and educated on how panic attacks effect on overall system with in the body. Discussed being easy on self and avoiding self  of progress with anxiety.     8/2/23  Reporting spending time with grand children which is always refreshing to her. She has been volunteering for a summer school program. Reporting having a larger groups adults causes her anxiety/headaches. Discussed saying no  as part of self care. She has been extending self more to engage in things she likes but has been noting more tiredness and anxiety.       Progress on Treatment Objective(s) / Homework:  Satisfactory progress - ACTION (Actively working towards change); Intervened by reinforcing change plan / affirming steps taken    Motivational Interviewing    MI Intervention: Expressed Empathy/Understanding, Supported Autonomy, Collaboration, Evocation, Open-ended questions, Change talk (evoked) and Reframe     Change Talk Expressed by the Patient: Desire to change Ability to change Reasons to change Need to change    Provider Response to Change Talk: E - Evoked more info from patient about behavior change, A - Affirmed patient's thoughts, decisions, or attempts at behavior change, R - Reflected patient's change talk and S - Summarized patient's change talk statements      Care Plan review completed: Yes    Medication Review:  No changes to current psychiatric medication(s)    Medication Compliance:  Yes    Changes in Health Issues:   None reported    Chemical Use Review:   Substance Use: Chemical use reviewed, no active concerns identified      Tobacco Use: No current tobacco use.      Assessment: Current Emotional / Mental Status (status of significant symptoms):  Risk status (Self / Other harm or suicidal ideation)  Patient denies a history of suicidal ideation, suicide attempts, self-injurious behavior, homicidal ideation, homicidal behavior and and other safety concerns  Patient denies current fears or concerns for personal safety.  Patient denies current or recent suicidal ideation or behaviors.  Patient denies current or recent homicidal ideation or behaviors.  Patient denies current or recent self injurious behavior or ideation.  Patient denies other safety concerns.  A safety and risk management plan has not been developed at this time, however patient was encouraged to call [facility] / [facility] should there be a change in  any of these risk factors.    Appearance:   Appropriate   Eye Contact:   Good   Psychomotor Behavior: Normal   Attitude:   Cooperative   Orientation:   All  Speech   Rate / Production: Normal    Volume:  Normal   Mood:    Anxious   Affect:    Appropriate   Thought Content:  Clear   Thought Form:  Coherent  Logical   Insight:    Good     Diagnoses:  1. Generalized anxiety disorder    2. Mood disorder (H24)    3. Insomnia, unspecified type            Collateral Reports Completed:  Not Applicable    Plan: (Homework, other):  Patient was given information about behavioral services and encouraged to schedule a follow up appointment with the clinic [facility] in 1 week.  She was also given information about mental health symptoms and treatment options .  CD Recommendations: No indications of CD issues.   Erika Francisco MSW LICSW      ______________________________________________________________________    Integrated Primary Care Behavioral Health Treatment Plan    Patient's Name: Jolynn Haji  YOB: 1955    Date of Creation: 10/5/22  Date Treatment Plan Last Reviewed/Revised: 8/2/23    DSM5 Diagnoses: 300.02 (F41.1) Generalized Anxiety Disorder  Psychosocial / Contextual Factors: Individual Factors high anxiety associated with depressive sx, interfereing with ability to function as she would like.  .  PROMIS (reviewed every 90 days):     Referral / Collaboration:  Was/were discussed and patient will pursue. - individual therapy    Anticipated number of session for this episode of care: 5-8 sessions  Anticipation frequency of session: Weekly  Anticipated Duration of each session: 38-52 minutes  Treatment plan will be reviewed in 90 days or when goals have been changed.       MeasurableTreatment Goal(s) related to diagnosis / functional impairment(s)  Goal 1: Patient will work with provider to manage symptoms    I will know I've met my goal when when less anxious and feeling down.      Objective #A (Patient  Action)    Patient will   attend all sessions .  Status: Continued - Date(s): 8/2/23    Intervention(s)  Therapist will teach  educate patient on treatment options, clarify concerns, work with pt to overcome any resistance to compliance. .    Objective #B  Patient will identify 3 fears / thoughts that contribute to feeling anxious.  Status: Continued - Date(s): 8/2/23    Intervention(s)  Therapist will  educate patient on treatment options, clarify concerns, work with pt to overcome any resistance to compliance. .    Objective #C  Patient will identify 3 initial signs or symptoms of anxiety.  Status: Continued - Date(s): 8/2/23    Intervention(s)  Therapist will  educate patient on treatment options, clarify concerns, work with pt to overcome any resistance to compliance. .        Patient has reviewed and agreed to the above plan.      Erika Francisco [facility]  October 12, 2023  Answers for HPI/ROS submitted by the patient on 12/6/2022  TIFFANY 7 TOTAL SCORE: 3  Answers for HPI/ROS submitted by the patient on 3/8/2023  If you checked off any problems, how difficult have these problems made it for you to do your work, take care of things at home, or get along with other people?: Not difficult at all  PHQ9 TOTAL SCORE: 5  Answers for HPI/ROS submitted by the patient on 4/26/2023  If you checked off any problems, how difficult have these problems made it for you to do your work, take care of things at home, or get along with other people?: Somewhat difficult  PHQ9 TOTAL SCORE: 1  Answers for HPI/ROS submitted by the patient on 6/12/2023  If you checked off any problems, how difficult have these problems made it for you to do your work, take care of things at home, or get along with other people?: Not difficult at all  PHQ9 TOTAL SCORE: 1  Answers for HPI/ROS submitted by the patient on 7/18/2023  If you checked off any problems, how difficult have these problems made it for you to do your work, take care of things at home, or  get along with other people?: Not difficult at all  PHQ9 TOTAL SCORE: 1Answers submitted by the patient for this visit:  Patient Health Questionnaire (Submitted on 8/27/2023)  If you checked off any problems, how difficult have these problems made it for you to do your work, take care of things at home, or get along with other people?: Not difficult at all  PHQ9 TOTAL SCORE: 1  Answers submitted by the patient for this visit:  Patient Health Questionnaire (Submitted on 10/11/2023)  PHQ9 TOTAL SCORE: 0

## 2023-10-10 ENCOUNTER — VIRTUAL VISIT (OUTPATIENT)
Dept: PSYCHIATRY | Facility: CLINIC | Age: 68
End: 2023-10-10
Payer: COMMERCIAL

## 2023-10-10 DIAGNOSIS — F41.1 GAD (GENERALIZED ANXIETY DISORDER): Primary | ICD-10-CM

## 2023-10-10 DIAGNOSIS — G47.00 INSOMNIA, UNSPECIFIED TYPE: ICD-10-CM

## 2023-10-10 DIAGNOSIS — Z87.820 HISTORY OF TRAUMATIC BRAIN INJURY: ICD-10-CM

## 2023-10-10 DIAGNOSIS — R25.1 TREMOR: ICD-10-CM

## 2023-10-10 PROCEDURE — 99214 OFFICE O/P EST MOD 30 MIN: CPT | Mod: 95 | Performed by: PSYCHIATRY & NEUROLOGY

## 2023-10-10 RX ORDER — TRAZODONE HYDROCHLORIDE 50 MG/1
25-50 TABLET, FILM COATED ORAL
Qty: 90 TABLET | Refills: 1 | Status: SHIPPED | OUTPATIENT
Start: 2023-10-10 | End: 2024-02-05

## 2023-10-10 RX ORDER — PROPRANOLOL HCL 60 MG
60 CAPSULE, EXTENDED RELEASE 24HR ORAL DAILY
Qty: 90 CAPSULE | Refills: 1 | Status: SHIPPED | OUTPATIENT
Start: 2023-10-10 | End: 2024-02-05

## 2023-10-10 NOTE — NURSING NOTE
Is the patient currently in the state of [place]? YES    Visit mode:VIDEO    If the visit is dropped, the patient can be reconnected by: VIDEO VISIT:  Send e-mail to at [email or web address]    Will anyone else be joining the visit? No  (If patient encounters technical issues they should call [phone])    How would you like to obtain your AVS? MyChart    Are changes needed to the allergy or medication list? No    Rooming Documentation: Patient will complete qnrs in mychart.    Reason for visit: RECHBRET Barron

## 2023-10-10 NOTE — PATIENT INSTRUCTIONS
Treatment Plan:   Continue trazodone 25-50 mg at bedtime as needed for sleep  Continue Effexor-XR/venlafaxine .5 mg for mood and anxiety. OK to trial decreasing down to 75 mg before next visit.   Discontinue propranolol IMMEDIATE RELEASE   Start propranolol EXTENDED RELEASE 60 mg ONCE daily for anxiety, tremor, headaches.  Continue all other cares per primary care provider.   Continue all other medications as reviewed per electronic medical record today.   Safety plan reviewed. To the Emergency Department as needed or call after hours crisis line at [phone] or [phone]. [place] Crisis Text Line. Text [place] to 141957 or Suicide LifeLine Chat: suicidepreventionlifeline.org/chat  Continue individual psychotherapy for additional support and ongoing development of nonpharmacologic coping skills and strategies.  Schedule an appointment with me in 3 months or sooner as needed. Call [facility] at [phone] to schedule.  Follow up with primary care provider as planned or for acute medical concerns.  Call the psychiatric nurse line with medication questions or concerns at [phone].  Pets are family toohart may be used to communicate with your provider, but this is not intended to be used for emergencies.

## 2023-10-10 NOTE — PROGRESS NOTES
"Telemedicine Visit: The patient's condition can be safely assessed and treated via synchronous audio and visual telemedicine encounter.      Reason for Telemedicine Visit: Patient has requested telehealth visit    Originating Site (Patient Location): Patient's home    Distant Location (provider location): On-Site    Consent:  The patient/guardian has verbally consented to: the potential risks and benefits of telemedicine (video visit) versus in person care; bill my insurance or make self-payment for services provided; and responsibility for payment of non-covered services.     Mode of Communication:  Video Conference via [employer]    As the provider I attest to compliance with applicable laws and regulations related to telemedicine.        Outpatient Psychiatric Progress Note    Name: Jolynn Haji   : 1955                    Primary Care Provider: Helga Ibarra MD   Therapist: Working with DINESH Whipple    PHQ-9 scores:      2023    11:30 AM 2023     6:46 PM 2023     4:53 PM   PHQ-9 SCORE   PHQ-9 Total Score MyChart 1 (Minimal depression) 1 (Minimal depression) 0   PHQ-9 Total Score 1 1 0       TIFFANY-7 scores:      2023     7:38 PM 2023     3:40 PM 2023     4:53 PM   TIFFANY-7 SCORE   Total Score 1 (minimal anxiety) 1 (minimal anxiety) 1 (minimal anxiety)   Total Score 1 1 1       Patient Identification:  Patient is a 68 year old,   White Choose not to answer female  who presents for return visit with me.  Patient is currently retired. Patient attended the phone/video session alone today. Patient prefers to be called: \" Azalea\".    Interim History:  I last saw Jolynn RESTREPO Adithya for outpatient psychiatry return visit on 2023. During that appointment, we:  Continue trazodone 25-50 mg at bedtime as needed for sleep  Decrease Effexor-XR/venlafaxine ER:   Week 1-2:  Take 150 mg two days, 112.5 mg one day, 150 mg two days, 112.5 mg one day, etc    Week 3-4:  Take 112.5 " "mg one day, 150 mg one day, 112.5 mg one day, etc    Week 5:  Take 112.5 mg daily moving forward    Continue propranolol 20 mg twice daily for anxiety, tremor, headaches.  Continue to follow with neurology as they recommend.    Continue all other cares per primary care provider.     10/10: Patient overall doing quite well.  Decreased venlafaxine going okay.  Some slight increased anxiety but managing it well.  Unfortunately fatigue and headaches have remained the same.  Summer was great. Grandkids had a good time.  Looking forward to the holidays.  Feels that propranolol has been quite helpful and wonders about a dose increase.  Tolerating well.  No acute safety concerns.  No SI.  No problematic drug or alcohol use.    Past Psychiatric Med Trials:  Psych Meds at Intake:  lexapro 20 mg daily  abilify 5 mg daily      Past Psych Meds:  Amitriptyline  Quetiapine  Mirtazapine - stopped \"because I was sleeping through the night\"    Psychiatric ROS:  Jolynn Haji reports mood has been: Stable  Anxiety has been: see HPI above  Sleep has been: Stable, trazodone working ok  Seema sxs: None  Psychosis sxs: N/A  ADHD/ADD sxs: N/A  PTSD sxs: N/A  PHQ9 and GAD7 scores were reviewed today if completed.   Medication side effects: Denies propranolol ASEs;  headaches and fatigue still from venlafaxine, even at decreased dose  Current stressors include: Symptoms and See HPI above  Coping mechanisms and supports include: Family, Hobbies and Friends, therapy    Current medications include:   Current Outpatient Medications   Medication Sig    clindamycin (CLINDAGEL) 1 % external gel Apply topically daily    lactobacillus rhamnosus, GG, (CULTURELL) capsule Take 1 capsule by mouth daily Taking every other day    lisinopril (ZESTRIL) 2.5 MG tablet Take 1 tablet (2.5 mg) by mouth daily    propranolol (INDERAL) 20 MG tablet Take 1 tablet (20 mg) by mouth 2 times daily    traZODone (DESYREL) 50 MG tablet Take 0.5-1 tablets (25-50 mg) by mouth " nightly as needed for sleep    venlafaxine (EFFEXOR XR) 37.5 MG 24 hr capsule Take one cap by mouth WITH 75 mg cap for total daily dose 112.5 mg.    venlafaxine (EFFEXOR XR) 75 MG 24 hr capsule Take one cap by mouth WITH 37.5 mg cap for total daily dose 112.5 mg.     Current Facility-Administered Medications   Medication    lidocaine 1 % 4 mL    lidocaine 1 % injection 3 mL    lidocaine 1 % injection 4 mL    triamcinolone (KENALOG-40) injection 40 mg       Past Medical/Surgical History:  Past Medical History:   Diagnosis Date    Abnormal glandular Papanicolaou smear of cervix- ASCUS in 2000 12/5/2003    ASC H- 2000    Basal cell carcinoma of leg, right 05/2016    Dr Rogers    Colon polyps 05/2018    tubular adenoam - due 5 yrs - 2 mm    Concussion without loss of consciousness 4/12/2017    Hidradenitis     left  groin    HSIL (high grade squamous intraepithelial lesion) on Pap smear of cervix 7/5/2000    Normal paps from 2001 to 2016/cc    Hypertension goal BP (blood pressure) < 140/90     OA (osteoarthritis) of knee     moderate    Postmenopausal since 7/2008     Shingles 7/23/2010    left     Squamous cell carcinoma of skin, unspecified     Synovial cyst of popliteal space       has a past medical history of Abnormal glandular Papanicolaou smear of cervix- ASCUS in 2000 (12/5/2003), Basal cell carcinoma of leg, right (05/2016), Colon polyps (05/2018), Concussion without loss of consciousness (4/12/2017), Hidradenitis, HSIL (high grade squamous intraepithelial lesion) on Pap smear of cervix (7/5/2000), Hypertension goal BP (blood pressure) < 140/90, OA (osteoarthritis) of knee, Postmenopausal (since 7/2008 ), Shingles (7/23/2010), Squamous cell carcinoma of skin, unspecified, and Synovial cyst of popliteal space.    She has no past medical history of Malignant melanoma (H) or Squamous cell carcinoma.    Social History:  Reviewed. No changes to social history except as noted above in HPI.    Vital Signs:   None.  This is phone/video visit.     Labs:  Most recent laboratory results reviewed and the pertinent results include:   TSH   Date Value Ref Range Status   02/08/2023 1.65 0.30 - 4.20 uIU/mL Final   04/25/2022 1.30 0.40 - 4.00 mU/L Final   01/13/2021 2.07 0.40 - 4.00 mU/L Final     Lab Results   Component Value Date    WBC 8.1 04/25/2022    WBC 6.4 01/13/2021     Lab Results   Component Value Date    RBC 4.58 04/25/2022    RBC 4.54 01/13/2021     Lab Results   Component Value Date    HGB 13.6 04/25/2022    HGB 13.3 01/13/2021     Lab Results   Component Value Date    HCT 41.1 04/25/2022    HCT 41.7 01/13/2021     No components found for: MCT  Lab Results   Component Value Date    MCV 90 04/25/2022    MCV 92 01/13/2021     Lab Results   Component Value Date    MCH 29.7 04/25/2022    MCH 29.3 01/13/2021     Lab Results   Component Value Date    MCHC 33.1 04/25/2022    MCHC 31.9 01/13/2021     Lab Results   Component Value Date    RDW 12.9 04/25/2022    RDW 13.1 01/13/2021     Lab Results   Component Value Date     04/25/2022     01/13/2021     Last Comprehensive Metabolic Panel:  Sodium   Date Value Ref Range Status   09/27/2023 140 135 - 145 mmol/L Final     Comment:     Reference intervals for this test were updated on 09/26/2023 to more accurately reflect our healthy population. There may be differences in the flagging of prior results with similar values performed with this method. Interpretation of those prior results can be made in the context of the updated reference intervals.    01/13/2021 140 133 - 144 mmol/L Final     Potassium   Date Value Ref Range Status   09/27/2023 4.6 3.4 - 5.3 mmol/L Final   04/25/2022 4.4 3.4 - 5.3 mmol/L Final   01/13/2021 4.1 3.4 - 5.3 mmol/L Final     Chloride   Date Value Ref Range Status   09/27/2023 104 98 - 107 mmol/L Final   04/25/2022 105 94 - 109 mmol/L Final   01/13/2021 108 94 - 109 mmol/L Final     Carbon Dioxide   Date Value Ref Range Status   01/13/2021 28 20  - 32 mmol/L Final     Carbon Dioxide (CO2)   Date Value Ref Range Status   09/27/2023 26 22 - 29 mmol/L Final   04/25/2022 26 20 - 32 mmol/L Final     Anion Gap   Date Value Ref Range Status   09/27/2023 10 7 - 15 mmol/L Final   04/25/2022 6 3 - 14 mmol/L Final   01/13/2021 4 3 - 14 mmol/L Final     Glucose   Date Value Ref Range Status   09/27/2023 97 70 - 99 mg/dL Final   04/25/2022 104 (H) 70 - 99 mg/dL Final   01/13/2021 91 70 - 99 mg/dL Final     Comment:     Fasting specimen     Urea Nitrogen   Date Value Ref Range Status   09/27/2023 23.6 (H) 8.0 - 23.0 mg/dL Final   04/25/2022 23 7 - 30 mg/dL Final   01/13/2021 18 7 - 30 mg/dL Final     Creatinine   Date Value Ref Range Status   09/27/2023 0.78 0.51 - 0.95 mg/dL Final   01/13/2021 0.68 0.52 - 1.04 mg/dL Final     GFR Estimate   Date Value Ref Range Status   09/27/2023 82 >60 mL/min/1.73m2 Final   01/13/2021 >90 >60 mL/min/[1.73_m2] Final     Comment:     Non  GFR Calc  Starting 12/18/2018, serum creatinine based estimated GFR (eGFR) will be   calculated using the Chronic Kidney Disease Epidemiology Collaboration   (CKD-EPI) equation.       Calcium   Date Value Ref Range Status   09/27/2023 9.7 8.8 - 10.2 mg/dL Final   01/13/2021 8.8 8.5 - 10.1 mg/dL Final     Bilirubin Total   Date Value Ref Range Status   09/27/2023 0.2 <=1.2 mg/dL Final   01/13/2021 0.8 0.2 - 1.3 mg/dL Final     Alkaline Phosphatase   Date Value Ref Range Status   09/27/2023 83 35 - 104 U/L Final   01/13/2021 72 40 - 150 U/L Final     ALT   Date Value Ref Range Status   09/27/2023 15 0 - 50 U/L Final     Comment:     Reference intervals for this test were updated on 6/12/2023 to more accurately reflect our healthy population. There may be differences in the flagging of prior results with similar values performed with this method. Interpretation of those prior results can be made in the context of the updated reference intervals.     01/13/2021 17 0 - 50 U/L Final     AST    Date Value Ref Range Status   09/27/2023 19 0 - 45 U/L Final     Comment:     Reference intervals for this test were updated on 6/12/2023 to more accurately reflect our healthy population. There may be differences in the flagging of prior results with similar values performed with this method. Interpretation of those prior results can be made in the context of the updated reference intervals.   01/13/2021 13 0 - 45 U/L Final     Review of Systems:  10 systems (general, cardiovascular, respiratory, eyes, ENT, endocrine, GI, , M/S, neurological) were reviewed. Most pertinent finding(s) is/are: Some chronic pains, see HPI above. The remaining systems are all unremarkable.    Mental Status Examination (limited as this is by phone/video):  Appearance: Awake, alert, appears stated age, no acute distress, well-groomed   Attitude:  cooperative, pleasant   Motor: No gross abnormalities noted today.  Not formally tested  Oriented to:  person, place, time, and situation  Attention Span and Concentration:  normal  Speech: Normal volume, normal rate, normal rhythm, coherent, no longer very delayed, no longer very hesitant  Language: intact  Mood: good  Affect: Overall appropriate and mood congruent  Associations:  no loose associations  Thought Process:  logical, linear and goal oriented  Thought Content:  no evidence of suicidal ideation or homicidal ideation, no evidence of psychotic thought, no auditory hallucinations present and no visual hallucinations present  Recent and Remote Memory: Intact to interview.  Not formally assessed.   Fund of Knowledge: appropriate  Insight: Good  Judgment:  intact, adequate for safety  Impulse Control:  intact    Suicide Risk Assessment:  Today Jolynn Haji reports no suicidal ideation. Based on all available evidence including the factors cited above, Jolynn Haji does not appear to be at imminent risk for self-harm, does not meet criteria for a 72-hr hold, and therefore remains  appropriate for ongoing outpatient level of care.  A thorough assessment of risk factors related to suicide and self-harm have been reviewed and are noted above. The patient convincingly denies suicidality on several occasions. Local community safety resources reviewed for patient to use if needed. There was no deceit detected, and the patient presented in a manner that was believable.     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 - lower pretibial area  07/12/2023     Priority: Medium    Drug-induced parkinsonism (H)- Abilify - generic = ARIPiprazole( 5mg) - RESOLVED OFF ABILIFY  01/02/2023     Priority: Medium    Mood disorder (H24) 01/02/2023     Priority: Medium    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 - much improved - resolved as of 5/5/2023 07/07/2022     Priority: Medium    Adjustment insomnia - resolved as of 5/5/2023 - off of ambien with psychiatry's okay  12/23/2021     Priority: Medium    Screening for cervical cancer      Priority: Medium     1999 NIL  2000 ASCUS-H.  >> Colpo: Neg  2001 NIL   2003 NIL pap, Neg HPV  [id number], 2016  NIL pap  7/25/19 NIL pap, Neg HPV    Criteria necessary to stop screening per ASCCP guidelines:  Older than 65 years  Three consecutive negative cytology results or two consecutive negative cotest  results within the previous 10 years, with the most recent being performed within the last 5 years.   (Women with hx of MADDIE 2 or 3, or adenocarcinoma in situ should continue screening for full 20 years, even if this goes past age 65).        Hyperlipidemia LDL goal <130 11/30/2018     Priority: Medium    Basal cell carcinoma      Priority: Medium    Colon polyps 05/01/2018     Priority: Medium    Primary localized osteoarthrosis of left lower leg 02/19/2018     Priority: Medium    Other secondary osteoarthritis of left knee 01/29/2018     Priority: Medium    Chronic constipation 09/14/2017     Priority: Medium    Raynaud's phenomenon without gangrene 09/14/2017     Priority: Medium    Insomnia, unspecified type 07/25/2017     Priority: Medium    TIFFANY (generalized anxiety disorder) 05/23/2017     Priority: Medium    Adjustment disorder with mixed anxiety and depressed mood- mild-moderate  at this time 05/11/2017     Priority: Medium    Family history of celiac disease 05/11/2017     Priority: Medium    Basal cell carcinoma of leg, right 05/01/2016     Priority: Medium    Sun-damaged skin 04/21/2016     Priority: Medium    Seasonal allergic rhinitis 04/21/2016     Priority: Medium    Dupuytren's disease of palm - right 4th digit flexor tendon 06/11/2015     Priority: Medium    History of migraine headaches- assoc. with nausea/vomiting - resolved around menopause - were  premenstrual  01/08/2014     Priority: Medium    Rosacea 05/01/2013     Priority: Medium    Numbness of feet- distal feet R>L  02/21/2011     Priority: Medium    NUMBNESS AND TINGLING OF FOOT - bilateral -mild  08/25/2010     Priority: Medium    Osteopenia 01/02/2009     Priority: Medium    Lipidosis 10/24/2006     Priority: Medium     Problem list name updated by automated process. Provider to review      Localized osteoarthritis of hand 11/01/2005     Priority: Medium     Problem list name updated by automated process. Provider to review      Stress  incontinence, female - mild 10/23/2012     Priority: Low    Cough 10/23/2012     Priority: Low    Acute bronchitis- recurrent- ? related to mold in school building- pt has since retired and no further bronchitis 10/23/2012     Priority: Low    Postmenopausal      Priority: Low    DERMATOPHYTOSIS OF NAIL- toenails  03/14/2006     Priority: Low     trichophyton rubrum on culture - 1/06       Synovial cyst of popliteal space      Priority: Low    Hidradenitis 12/05/2003     Priority: Low       Psychosocial & Contextual Factors: see HPI above    Assessment:  From Intake, 5/12/2022:  Jolynn Haji is a 66-year-old female with a history including anxiety, depression, insomnia status post concussion in April 2017.  No major mental health struggles prior to concussion in 2017.  Patient was started on Lexapro, quetiapine, mirtazapine to help with her symptoms.  Patient had been feeling much better on that medication combination and once she was sleeping well again quetiapine and mirtazapine were discontinued.  This past January the patient started to have some worsening symptoms and Abilify was added.  Patient has felt like her current medications of Lexapro and Abilify have been helpful but reports starting to feel really slowed and numb the past several weeks.  She also reported a weight loss of nearly 20 pounds over the last 6 weeks versus several months (patient was not very sure). I am wondering if she could be experiencing side effects of her increased Lexapro dose.  She is agreeable to decreasing her dose slightly to 15 mg daily.  Could also consider decreasing Abilify in the case it is not her Lexapro.  She also desires to sleep a little bit better than she has been.  Could consider restarting mirtazapine at bedtime for sleep and to also augment her Lexapro.  Could also be beneficial for patient to undergo cognitive screening to check in and see what her baseline is status post concussion.  No acute safety concerns.   No SI.  No problematic drug or alcohol use.    5/24/2022:  Patient with some slight improvements of possible medication related side effects after decreasing Lexapro.  I am wondering if there might be more robust improvements with decreasing Abilify/aripiprazole.  Possible drug-induced Parkinsonism?  She does seem to have delayed/slowed speech, masked facies.  Her gait and other movements unable to be observed but she does report other struggles that may be consistent with such a presentation.  We are going to decrease her Abilify down to 2 mg daily and I will see her back in just a few weeks.  We will likely discontinue her Abilify at her next visit.  I will message her primary care provider to get some collateral regarding previous baseline functioning prior to starting Abilify.  History of TBI could be confounding presentation.  Could consider neurology consult.    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  Instructed to stop Abilify today.  We will monitor for improvement of symptoms.  If symptoms do not improve may need to refer to neurology.  If symptoms start to improve we will consider low-dose methylphenidate augmentation of Lexapro next visit to help with low energy, mood, focus and concentration, cognition, s/p TBI if still clinically indicated.  We would discuss risks and benefits with patient and her daughter at that time.  No other acute safety concerns or SI.  No problematic drug or alcohol use.  If patient starts to struggle with sleep would restart mirtazapine at bedtime.    7/28/2022:  Pt still with sxs consistent with drug-induced Parksinsonism.  Neurology consult will be placed so that patient might get scheduled sooner than later due to the typically long wait.  We will start Ambien in hopes we can get her sleeping much better.  Discussed risks and benefits of its use, particularly in the geriatric population.  Lexapro has not been as helpful as it helped for her  anxiety.  We will taper this medication and start Effexor/venlafaxine in hopes that might be more effective for her symptoms.  No acute safety concerns.  No SI.  No problematic drug or alcohol use.    8/11/2022:  Overall with continued improved symptoms.  Hopefully patient will continue to have improvement of her symptoms off of Abilify.  Patient will be seeing neurology soon.  Encouraged to keep appointment.  We will continue taper off of Lexapro and continue on monotherapy with venlafaxine.  We will also continue Ambien at this time for sleep since patient is sleeping much better on the medication with improved symptoms and tolerating well.  No acute safety concerns.  No problematic drug or alcohol use.    9/1/2022:  Ongoing anxiety, still intense at times.  Still not sleeping very well.  Could consider sleep evaluation, but patient quite overwhelmed right now with various referrals and doctors appointments.  Amblavon had been working quite well but not keeping her sleep now.  Still falling asleep relatively well.  We will transition to Ambien CR to see if that is more helpful.  We will continue to consider sleep evaluation to rule out possibility of REM behavior sleep disorder.  We will also transition venlafaxine to bedtime to see if that helps with any possible nausea.  Working with neurology to monitor for Parkinson's disease.  I do recommend neuropsychological testing due to ongoing possible cognitive difficulties/struggles.  No acute safety concerns.  No SI.  No problematic drug or alcohol use.    10/3/2022:  Patient overall with some ongoing anxiety but slowly improving since it got worse late August.  Recommend continuing to optimize Effexor-XR, especially since patient tolerating well.  No longer having any nausea now that dose is taken at bedtime.  Patient feeling a little foggy and off balance with Ambien CR and so we will switch back to Ambien immediate release.  No falls.  Recommend increasing  Effexor-XR first and once well tolerated switching back to Ambien immediate release.  May need to continue to work on alternative options for sleep if does not do well back on Ambien immediate release.  No acute safety concerns.  No SI.  No problematic drug or alcohol use.  Encouraged to continue working with neurology.  Next appointment in November.    10/24/2022:  Patient overall with some improved anxiety on increased dose of Effexor-XR.  Could use additional improvement of anxiety and so we will continue to optimize therapy with Effexor-XR.  We will increase dose to 150 mg daily.  Tolerating well with no notable negative side effects.  Patient transitioned back to Ambien CR and is sleeping better on this compared to Ambien immediate release.  We will continue Ambien CR for sleep.  Discussed possibility of sleep medicine referral but patient declines at this time.  Could consider in the future.  No acute safety concerns.  No SI.  No problematic drug or alcohol use.  Encouraged to continue working with neurology.    12/09/2022:  Patient overall doing fairly well.  Feels like increased dose of venlafaxine has been helpful.  I do wonder if she is having some subtle withdrawal symptoms late in the afternoon with headache and fatigue.  Discussed shifting her dose to the morning.  However, she reports she will try to drink more water and see if that is helpful before trying to make changes to her medication.  She feels like things are going well enough that she really does not want to change anything.  Encouraged to continue in therapy.  No acute safety concerns.  No SI.  No problematic drug or alcohol use.    1/19/2023:  Patient overall doing well with ongoing improvements.  Patient even recently starting to drive again which is a big deal.  She continues in therapy and finds it helpful.  Discussed possible changes to her dosing to see if headaches might improve, along with fatigue.  Discussed possibly splitting her  Effexor-XR dose as 75 mg twice daily versus 37 point 5 in the morning and 112.5 mg at bedtime.  Patient feels like she is in a good place right now and really does not want to make any medication changes if possible.  We will keep things the same for now.  Encouraged to drink lots of water.  No acute safety concerns.  No SI.  No problematic drug or alcohol use.    3/9/2023:  Since last visit we decreased Effexor-XR just slightly.  Unfortunately her anxiety has worsened on decreased dose.  Headaches and fatigue did not improve at all.  Since anxiety worsened we will go back up to 150 mg daily.  Patient prefers this over a trial of further decreased dose.  We will start a trial with propranolol to see if this helps improve anxiety and headaches.  May also help with her tremor.  Discussed watching for feeling too lightheaded or dizzy.  Patient also on low-dose lisinopril.  Last vital signs blood pressure 132/78 and pulse 96 on 2/24/2023.  No acute safety concerns.  No SI.  No problematic drug or alcohol use.    3/23/2023:  Patient overall doing very well.  Propranolol has been quite helpful.  Still having some anxiety and headaches so we will continue to optimize therapy with propranolol some.  We will increase to 20 mg twice daily.  Neuropsychiatric testing reviewed.  Some mild deficits but nothing indicating a neurocognitive disorder at this time.  This was reassuring to the patient.  Patient will review results with neurologist at upcoming appointment in May.  No acute safety concerns.  No SI.  No problematic drug or alcohol use.    4/27/2023:  Patient overall with relative stability of mood and anxiety symptoms.  We will work to discontinue Ambien to see if we can manage on medication such as trazodone.  I still continue to worry about possibility of prodromal Parkinson's disease.  Patient reported her sister noted patient to be yelling and screaming in her sleep the other night.  This concerns me for possible REM  sleep behavior disorder.  Could simply be related to antidepressant therapy-but given recent parkinsonism response to neuroleptic, symptoms should continue to be monitored closely.  Could again consider sleep medicine referral at some point in the future if needed.  Patient also continues to have headaches.  If patient remains stable would like to trial decreased dose of Effexor-XR to see if this helps improve headaches.  No acute safety concerns.  No SI.  No problematic drug or alcohol use.    5/11/2023:  Patient overall doing well.  Was able to replace Ambien with trazodone and still sleeping well.  Still some headaches but greatly improved off Ambien.  Went back to taking propranolol 20 mg twice daily and finds this most effective for her anxiety, tremor, etc.  Discussed we could consider lowering venlafaxine ER in a couple of months if still doing really well and still having some headaches.  Patient has a neurology appointment tomorrow for check-in.  No acute safety concerns.  No SI.  No problematic drug or alcohol use.    7/11/2023:  Patient overall doing well still.  Mood has remained stable.  Anxiety overall manageable and stable as well.  Unfortunately continues to have headaches and feel fatigued.  Discussed I would like to move ahead with a trial of decreased venlafaxine.  Patient agreeable and will monitor mental health symptoms.  No acute safety concerns.  No SI.  No problematic drug or alcohol use.    10/10/2023:  Patient overall doing okay.  Unfortunately headaches and fatigue have persisted even with decreased dose of venlafaxine.  We will increase propranolol and changed to extended release so it is once a day to see if that is a little bit more helpful for anxiety and headaches.  Patient controlled decreased dose of venlafaxine and we may consider changing to something different after the holidays.  We would like to prevent any decompensation during the holiday season.  No acute safety concerns.   No SI.  No problematic drug or alcohol use.  Patient continues in therapy.    Medication side effects and alternatives were reviewed. Health promotion activities recommended and reviewed today. All questions addressed. Education and counseling completed regarding risks and benefits of medications and psychotherapy options. Recommend therapy for additional support.     Treatment Plan:   Continue trazodone 25-50 mg at bedtime as needed for sleep  Continue Effexor-XR/venlafaxine .5 mg for mood and anxiety. OK to trial decreasing down to 75 mg before next visit.   Discontinue propranolol IMMEDIATE RELEASE   Start propranolol EXTENDED RELEASE 60 mg ONCE daily for anxiety, tremor, headaches.  Continue all other cares per primary care provider.   Continue all other medications as reviewed per electronic medical record today.   Safety plan reviewed. To the Emergency Department as needed or call after hours crisis line at [phone] or [phone]. [place] Crisis Text Line. Text [place] to 008744 or Suicide LifeLine Chat: suicidepreventionBad Seed Entertainmentline.org/chat  Continue individual psychotherapy for additional support and ongoing development of nonpharmacologic coping skills and strategies.  Schedule an appointment with me in 3 months or sooner as needed. Call [facility] at [phone] to schedule.  Follow up with primary care provider as planned or for acute medical concerns.  Call the psychiatric nurse line with medication questions or concerns at [phone].  MyChart may be used to communicate with your provider, but this is not intended to be used for emergencies.    Administrative Billing:   Phone Call/Video Duration: 15 Minutes  Start: 11:28a  Stop: 11:43a    Patient Status:  Patient will continue to be seen for ongoing consultation and stabilization.    Signed:   Judy Pompa DO  [facility]    Disclaimer: This note consists of symbols derived from keyboarding, dictation and/or voice  recognition software. As a result, there may be errors in the script that have gone undetected. Please consider this when interpreting information found in this chart.

## 2023-10-11 ASSESSMENT — PATIENT HEALTH QUESTIONNAIRE - PHQ9
SUM OF ALL RESPONSES TO PHQ QUESTIONS 1-9: 0
SUM OF ALL RESPONSES TO PHQ QUESTIONS 1-9: 0

## 2023-10-12 ENCOUNTER — VIRTUAL VISIT (OUTPATIENT)
Dept: BEHAVIORAL HEALTH | Facility: CLINIC | Age: 68
End: 2023-10-12
Payer: COMMERCIAL

## 2023-10-12 DIAGNOSIS — F41.1 GENERALIZED ANXIETY DISORDER: Primary | ICD-10-CM

## 2023-10-12 DIAGNOSIS — G47.00 INSOMNIA, UNSPECIFIED TYPE: ICD-10-CM

## 2023-10-12 DIAGNOSIS — F39 MOOD DISORDER (H): ICD-10-CM

## 2023-10-12 PROCEDURE — 90832 PSYTX W PT 30 MINUTES: CPT | Mod: 95 | Performed by: SOCIAL WORKER

## 2023-10-17 ENCOUNTER — PATIENT OUTREACH (OUTPATIENT)
Dept: CARE COORDINATION | Facility: CLINIC | Age: 68
End: 2023-10-17
Payer: COMMERCIAL

## 2023-10-23 NOTE — PROGRESS NOTES
[facility] Behavioral [facility]  October 25, 2023      Behavioral Health Clinician Progress Note    Patient Name: Jolynn Haji           Service Type:  Individual      Service Location:   MyChart / Email (patient reached)     Session Start Time: 10:00 am   Session End Time: 10:44 am      Session Length: 38 - 52      Attendees: Patient     Service Modality:  Video Visit:      Provider verified identity through the following two step process.  Patient provided:  Patient is known previously to provider    Telemedicine Visit: The patient's condition can be safely assessed and treated via synchronous audio and visual telemedicine encounter.      Reason for Telemedicine Visit: Patient has requested telehealth visit    Originating Site (Patient Location): Patient's home    Distant Site (Provider Location): [facility]    Consent:  The patient/guardian has verbally consented to: the potential risks and benefits of telemedicine (video visit) versus in person care; bill my insurance or make self-payment for services provided; and responsibility for payment of non-covered services.     Patient would like the video invitation sent by:  My Chart    Mode of Communication:  Video Conference via [employer]    As the provider I attest to compliance with applicable laws and regulations related to telemedicine.    Visit Activities (Refresh list every visit): [facility] Only    Diagnostic Assessment Date: 9/27/22  Treatment Plan Review Date: 11/2/23  See Flowsheets for today's PHQ-9 and TIFFANY-7 results  Previous PHQ-9:       8/27/2023     6:46 PM 9/27/2023     4:53 PM 10/11/2023    11:51 AM   PHQ-9 SCORE   PHQ-9 Total Score [facility] 1 (Minimal depression) 0 0   PHQ-9 Total Score 1 0 0     Previous TIFFANY-7:       7/11/2023     7:38 PM 9/25/2023     3:40 PM 9/27/2023     4:53 PM   TIFFANY-7 SCORE   Total Score 1 (minimal anxiety) 1 (minimal anxiety) 1 (minimal anxiety)   Total Score 1 1  1       KARINA LEVEL:      8/27/2010     3:00 PM 2/22/2011    11:00 AM   KARINA Score (Last Two)   KARINA Raw Score 51 49   Activation Score 91.6 82.8   KARINA Level 4 4       DATA  Extended Session (60+ minutes): No  Interactive Complexity: No  Crisis: No  [facility] Patient: No    Treatment Objective(s) Addressed in This Session:  Target Behavior(s):  anxiety and depression    Depressed Mood: Increase interest, engagement, and pleasure in doing things  Decrease frequency and intensity of feeling down, depressed, hopeless  Improve quantity and quality of night time sleep / decrease daytime naps  Feel less tired and more energy during the day   Improve diet, appetite, mindful eating, and / or meal planning  Identify negative self-talk and behaviors: challenge core beliefs, myths, and actions  Improve concentration, focus, and mindfulness in daily activities   Feel less fidgety, restless or slow in daily activities / interpersonal interactions  Anxiety: will experience a reduction in anxiety, will develop more effective coping skills to manage anxiety symptoms, will develop healthy cognitive patterns and beliefs and will increase ability to function adaptively    Current Stressors / Issues:  Reports headaches have improved a lot. 1.5 wks without headaches. Reports having very little anxiety when hosting card club with friends. Overall feeling anxiety and mental health has been really good over the past few days.     10/12/23  Reports she is doing well. Discussed appts with providers going well. Has continued to have headaches. Does not want to make med changes around the holiday. Discussed planning a party for friends at her house.     9/26/23  Anxiety has been pretty good for the past week. Managing minor stressors better. Continues to have headaches frequently. Sleep continues to be good. Had one day of depressed mood but passed. Experienced some anxiety of possibly depressed mood coming on. Processed feels around this. Discussed  self care and taking time for self.     9/18/23  Reports she has been doing pretty well. Having more frequent anxiety but not to the point of not being able to keep using skills. Continues to have headaches. Reports with closing the cabin and end of the summer, causes anxiety to be higher. She has been entertaining more frequently as well. The holidays coming has been somewhat more anxiety provoking. Discussed being mindful of self care and living presently.     9/7/23  Reports finishing up summer projects. Minimal anxiety as of late. Had a more difficult day yesterday with anxiety but it had passed. She was having some feelings of brain fog also. Sleep continues to be very good. Headaches are less frequent/severe but do persist. Discussed desire to be off meds completely as ultimate goal. Nothing she plans to rush into.       8/21/23  Reporting more good days than bad days. Having more anxiety days since cutting back on medications. She has not been altering her behaviors in life. Preparing for Labor day weekend with family. She is having minimal anxiety. Reinforced skills and continued practice. States she has been reading recommended books with other suggestions for tx of anxiety. She has been enjoying trying different tech as well.     8/16/23  Reports weaning off some of her anxiety medications in attempts to reduce headaches. Has been not been noticing much of a change in her anxiety. She has been working on increasing old activities she had been doing over a year ago prior experiencing high anxiety. Reports one panic attack related to making many decisions and a hectic situation. Attempted to do breathing tech to self sooth. Reports rapid heart rate lasted almost the rest of day despite use of multiple techs. Discussed nature of anxiety and educated on how panic attacks effect on overall system with in the body. Discussed being easy on self and avoiding self  of progress with anxiety.      8/2/23  Reporting spending time with grand children which is always refreshing to her. She has been volunteering for a summer school program. Reporting having a larger groups adults causes her anxiety/headaches. Discussed saying no as part of self care. She has been extending self more to engage in things she likes but has been noting more tiredness and anxiety.       Progress on Treatment Objective(s) / Homework:  Satisfactory progress - ACTION (Actively working towards change); Intervened by reinforcing change plan / affirming steps taken    Motivational Interviewing    MI Intervention: Expressed Empathy/Understanding, Supported Autonomy, Collaboration, Evocation, Open-ended questions, Change talk (evoked) and Reframe     Change Talk Expressed by the Patient: Desire to change Ability to change Reasons to change Need to change    Provider Response to Change Talk: E - Evoked more info from patient about behavior change, A - Affirmed patient's thoughts, decisions, or attempts at behavior change, R - Reflected patient's change talk and S - Summarized patient's change talk statements      Care Plan review completed: Yes    Medication Review:  No changes to current psychiatric medication(s)    Medication Compliance:  Yes    Changes in Health Issues:   None reported    Chemical Use Review:   Substance Use: Chemical use reviewed, no active concerns identified      Tobacco Use: No current tobacco use.      Assessment: Current Emotional / Mental Status (status of significant symptoms):  Risk status (Self / Other harm or suicidal ideation)  Patient denies a history of suicidal ideation, suicide attempts, self-injurious behavior, homicidal ideation, homicidal behavior and and other safety concerns  Patient denies current fears or concerns for personal safety.  Patient denies current or recent suicidal ideation or behaviors.  Patient denies current or recent homicidal ideation or behaviors.  Patient denies current or  recent self injurious behavior or ideation.  Patient denies other safety concerns.  A safety and risk management plan has not been developed at this time, however patient was encouraged to call [place] [phone] should there be a change in any of these risk factors.    Appearance:   Appropriate   Eye Contact:   Good   Psychomotor Behavior: Normal   Attitude:   Cooperative   Orientation:   All  Speech   Rate / Production: Normal    Volume:  Normal   Mood:    Anxious   Affect:    Appropriate   Thought Content:  Clear   Thought Form:  Coherent  Logical   Insight:    Good     Diagnoses:  1. Generalized anxiety disorder    2. Mood disorder (H24)    3. Insomnia, unspecified type              Collateral Reports Completed:  Not Applicable    Plan: (Homework, other):  Patient was given information about behavioral services and encouraged to schedule a follow up appointment with the clinic [facility] in 1 week.  She was also given information about mental health symptoms and treatment options .  CD Recommendations: No indications of CD issues.   Erika Francisco MSW LICSW      ______________________________________________________________________    Integrated Primary Care Behavioral Health Treatment Plan    Patient's Name: Jolynn Haji  YOB: 1955    Date of Creation: 10/5/22  Date Treatment Plan Last Reviewed/Revised: 8/2/23    DSM5 Diagnoses: 300.02 (F41.1) Generalized Anxiety Disorder  Psychosocial / Contextual Factors: Individual Factors high anxiety associated with depressive sx, interfereing with ability to function as she would like.  .  PROMIS (reviewed every 90 days):     Referral / Collaboration:  Was/were discussed and patient will pursue. - individual therapy    Anticipated number of session for this episode of care: 5-8 sessions  Anticipation frequency of session: Weekly  Anticipated Duration of each session: 38-52 minutes  Treatment plan will be reviewed in 90 days or when goals have been changed.        MeasurableTreatment Goal(s) related to diagnosis / functional impairment(s)  Goal 1: Patient will work with provider to manage symptoms    I will know I've met my goal when when less anxious and feeling down.      Objective #A (Patient Action)    Patient will   attend all sessions .  Status: Continued - Date(s): 8/2/23    Intervention(s)  Therapist will teach  educate patient on treatment options, clarify concerns, work with pt to overcome any resistance to compliance. .    Objective #B  Patient will identify 3 fears / thoughts that contribute to feeling anxious.  Status: Continued - Date(s): 8/2/23    Intervention(s)  Therapist will  educate patient on treatment options, clarify concerns, work with pt to overcome any resistance to compliance. .    Objective #C  Patient will identify 3 initial signs or symptoms of anxiety.  Status: Continued - Date(s): 8/2/23    Intervention(s)  Therapist will  educate patient on treatment options, clarify concerns, work with pt to overcome any resistance to compliance. .        Patient has reviewed and agreed to the above plan.      Erika Francisco [facility]  October 25, 2023  Answers for HPI/ROS submitted by the patient on 12/6/2022  TIFFANY 7 TOTAL SCORE: 3  Answers for HPI/ROS submitted by the patient on 3/8/2023  If you checked off any problems, how difficult have these problems made it for you to do your work, take care of things at home, or get along with other people?: Not difficult at all  PHQ9 TOTAL SCORE: 5  Answers for HPI/ROS submitted by the patient on 4/26/2023  If you checked off any problems, how difficult have these problems made it for you to do your work, take care of things at home, or get along with other people?: Somewhat difficult  PHQ9 TOTAL SCORE: 1  Answers for HPI/ROS submitted by the patient on 6/12/2023  If you checked off any problems, how difficult have these problems made it for you to do your work, take care of things at home, or get along with other  people?: Not difficult at all  PHQ9 TOTAL SCORE: 1  Answers for HPI/ROS submitted by the patient on 7/18/2023  If you checked off any problems, how difficult have these problems made it for you to do your work, take care of things at home, or get along with other people?: Not difficult at all  PHQ9 TOTAL SCORE: 1Answers submitted by the patient for this visit:  Patient Health Questionnaire (Submitted on 8/27/2023)  If you checked off any problems, how difficult have these problems made it for you to do your work, take care of things at home, or get along with other people?: Not difficult at all  PHQ9 TOTAL SCORE: 1  Answers submitted by the patient for this visit:  Patient Health Questionnaire (Submitted on 10/11/2023)  PHQ9 TOTAL SCORE: 0

## 2023-10-25 ENCOUNTER — VIRTUAL VISIT (OUTPATIENT)
Dept: BEHAVIORAL HEALTH | Facility: CLINIC | Age: 68
End: 2023-10-25
Payer: COMMERCIAL

## 2023-10-25 DIAGNOSIS — F39 MOOD DISORDER (H): ICD-10-CM

## 2023-10-25 DIAGNOSIS — G47.00 INSOMNIA, UNSPECIFIED TYPE: ICD-10-CM

## 2023-10-25 DIAGNOSIS — F41.1 GENERALIZED ANXIETY DISORDER: Primary | ICD-10-CM

## 2023-10-25 PROCEDURE — 90834 PSYTX W PT 45 MINUTES: CPT | Mod: 95 | Performed by: SOCIAL WORKER

## 2023-10-31 ENCOUNTER — PATIENT OUTREACH (OUTPATIENT)
Dept: GASTROENTEROLOGY | Facility: CLINIC | Age: 68
End: 2023-10-31
Payer: COMMERCIAL

## 2023-11-06 NOTE — PROGRESS NOTES
[facility]  November 8, 2023      Behavioral Health Clinician Progress Note    Patient Name: Jolynn Haji           Service Type:  Individual      Service Location:   MyChart / Email (patient reached)     Session Start Time: 10:30 am   Session End Time: 11:14 am      Session Length: 38 - 52      Attendees: Patient     Service Modality:  Video Visit:      Provider verified identity through the following two step process.  Patient provided:  Patient is known previously to provider    Telemedicine Visit: The patient's condition can be safely assessed and treated via synchronous audio and visual telemedicine encounter.      Reason for Telemedicine Visit: Patient has requested telehealth visit    Originating Site (Patient Location): Patient's home    Distant Site (Provider Location): [facility]    Consent:  The patient/guardian has verbally consented to: the potential risks and benefits of telemedicine (video visit) versus in person care; bill my insurance or make self-payment for services provided; and responsibility for payment of non-covered services.     Patient would like the video invitation sent by:  My Chart    Mode of Communication:  Video Conference via [facility]    As the provider I attest to compliance with applicable laws and regulations related to telemedicine.    Visit Activities (Refresh list every visit): [facility] Only    Diagnostic Assessment Date: 9/27/22  Treatment Plan Review Date: 11/2/23  See Flowsheets for today's PHQ-9 and TIFFANY-7 results  Previous PHQ-9:       8/27/2023     6:46 PM 9/27/2023     4:53 PM 10/11/2023    11:51 AM   PHQ-9 SCORE   PHQ-9 Total Score [facility] 1 (Minimal depression) 0 0   PHQ-9 Total Score 1 0 0     Previous TIFFANY-7:       7/11/2023     7:38 PM 9/25/2023     3:40 PM 9/27/2023     4:53 PM   TIFFANY-7 SCORE   Total Score 1 (minimal anxiety) 1 (minimal anxiety) 1 (minimal anxiety)   Total Score 1 1  "1       KARINA LEVEL:      8/27/2010     3:00 PM 2/22/2011    11:00 AM   KARINA Score (Last Two)   KARINA Raw Score 51 49   Activation Score 91.6 82.8   KARINA Level 4 4       DATA  Extended Session (60+ minutes): No  Interactive Complexity: No  Crisis: No  [facility] Patient: No    Treatment Objective(s) Addressed in This Session:  Target Behavior(s):  anxiety and depression    Depressed Mood: Increase interest, engagement, and pleasure in doing things  Decrease frequency and intensity of feeling down, depressed, hopeless  Improve quantity and quality of night time sleep / decrease daytime naps  Feel less tired and more energy during the day   Improve diet, appetite, mindful eating, and / or meal planning  Identify negative self-talk and behaviors: challenge core beliefs, myths, and actions  Improve concentration, focus, and mindfulness in daily activities   Feel less fidgety, restless or slow in daily activities / interpersonal interactions  Anxiety: will experience a reduction in anxiety, will develop more effective coping skills to manage anxiety symptoms, will develop healthy cognitive patterns and beliefs and will increase ability to function adaptively    Current Stressors / Issues:  Reports doing \"really well\", reports possibly the best she has been since working with [facility]. She is feeling overall very healthy. Discussed time spent with grand children. Reports she will be seeing her brother later today who has struggled with drinking for many years. Discussed how she emotionally manages this relationship and setting boundaries.     10/25/23  Reports headaches have improved a lot. 1.5 wks without headaches. Reports having very little anxiety when hosting card club with friends. Overall feeling anxiety and mental health has been really good over the past few days.     10/12/23  Reports she is doing well. Discussed appts with providers going well. Has continued to have headaches. Does not want to make med changes around the " holiday. Discussed planning a party for friends at her house.     9/26/23  Anxiety has been pretty good for the past week. Managing minor stressors better. Continues to have headaches frequently. Sleep continues to be good. Had one day of depressed mood but passed. Experienced some anxiety of possibly depressed mood coming on. Processed feels around this. Discussed self care and taking time for self.     9/18/23  Reports she has been doing pretty well. Having more frequent anxiety but not to the point of not being able to keep using skills. Continues to have headaches. Reports with closing the cabin and end of the summer, causes anxiety to be higher. She has been entertaining more frequently as well. The holidays coming has been somewhat more anxiety provoking. Discussed being mindful of self care and living presently.     9/7/23  Reports finishing up summer projects. Minimal anxiety as of late. Had a more difficult day yesterday with anxiety but it had passed. She was having some feelings of brain fog also. Sleep continues to be very good. Headaches are less frequent/severe but do persist. Discussed desire to be off meds completely as ultimate goal. Nothing she plans to rush into.       8/21/23  Reporting more good days than bad days. Having more anxiety days since cutting back on medications. She has not been altering her behaviors in life. Preparing for Labor day weekend with family. She is having minimal anxiety. Reinforced skills and continued practice. States she has been reading recommended books with other suggestions for tx of anxiety. She has been enjoying trying different tech as well.     8/16/23  Reports weaning off some of her anxiety medications in attempts to reduce headaches. Has been not been noticing much of a change in her anxiety. She has been working on increasing old activities she had been doing over a year ago prior experiencing high anxiety. Reports one panic attack related to making many  decisions and a hectic situation. Attempted to do breathing tech to self sooth. Reports rapid heart rate lasted almost the rest of day despite use of multiple techs. Discussed nature of anxiety and educated on how panic attacks effect on overall system with in the body. Discussed being easy on self and avoiding self  of progress with anxiety.     8/2/23  Reporting spending time with grand children which is always refreshing to her. She has been volunteering for a summer school program. Reporting having a larger groups adults causes her anxiety/headaches. Discussed saying no as part of self care. She has been extending self more to engage in things she likes but has been noting more tiredness and anxiety.       Progress on Treatment Objective(s) / Homework:  Satisfactory progress - ACTION (Actively working towards change); Intervened by reinforcing change plan / affirming steps taken    Motivational Interviewing    MI Intervention: Expressed Empathy/Understanding, Supported Autonomy, Collaboration, Evocation, Open-ended questions, Change talk (evoked) and Reframe     Change Talk Expressed by the Patient: Desire to change Ability to change Reasons to change Need to change    Provider Response to Change Talk: E - Evoked more info from patient about behavior change, A - Affirmed patient's thoughts, decisions, or attempts at behavior change, R - Reflected patient's change talk and S - Summarized patient's change talk statements      Care Plan review completed: Yes    Medication Review:  No changes to current psychiatric medication(s)    Medication Compliance:  Yes    Changes in Health Issues:   None reported    Chemical Use Review:   Substance Use: Chemical use reviewed, no active concerns identified      Tobacco Use: No current tobacco use.      Assessment: Current Emotional / Mental Status (status of significant symptoms):  Risk status (Self / Other harm or suicidal ideation)  Patient denies a history of suicidal  ideation, suicide attempts, self-injurious behavior, homicidal ideation, homicidal behavior and and other safety concerns  Patient denies current fears or concerns for personal safety.  Patient denies current or recent suicidal ideation or behaviors.  Patient denies current or recent homicidal ideation or behaviors.  Patient denies current or recent self injurious behavior or ideation.  Patient denies other safety concerns.  A safety and risk management plan has not been developed at this time, however patient was encouraged to call [place] [phone] should there be a change in any of these risk factors.    Appearance:   Appropriate   Eye Contact:   Good   Psychomotor Behavior: Normal   Attitude:   Cooperative   Orientation:   All  Speech   Rate / Production: Normal    Volume:  Normal   Mood:    Anxious   Affect:    Appropriate   Thought Content:  Clear   Thought Form:  Coherent  Logical   Insight:    Good     Diagnoses:  1. Generalized anxiety disorder    2. Mood disorder (H24)    3. Insomnia, unspecified type                Collateral Reports Completed:  Not Applicable    Plan: (Homework, other):  Patient was given information about behavioral services and encouraged to schedule a follow up appointment with the clinic [facility] in 1 week.  She was also given information about mental health symptoms and treatment options .  CD Recommendations: No indications of CD issues.   Erika Francisco [facility]      ______________________________________________________________________    Integrated Primary Care Behavioral Health Treatment Plan    Patient's Name: Jolynn Haji  YOB: 1955    Date of Creation: 10/5/22  Date Treatment Plan Last Reviewed/Revised: 8/2/23    DSM5 Diagnoses: 300.02 (F41.1) Generalized Anxiety Disorder  Psychosocial / Contextual Factors: Individual Factors high anxiety associated with depressive sx, interfereing with ability to function as she would like.  .  PROMIS (reviewed every 90 days):      Referral / Collaboration:  Was/were discussed and patient will pursue. - individual therapy    Anticipated number of session for this episode of care: 5-8 sessions  Anticipation frequency of session: Weekly  Anticipated Duration of each session: 38-52 minutes  Treatment plan will be reviewed in 90 days or when goals have been changed.       MeasurableTreatment Goal(s) related to diagnosis / functional impairment(s)  Goal 1: Patient will work with provider to manage symptoms    I will know I've met my goal when when less anxious and feeling down.      Objective #A (Patient Action)    Patient will   attend all sessions .  Status: Continued - Date(s): 8/2/23    Intervention(s)  Therapist will teach  educate patient on treatment options, clarify concerns, work with pt to overcome any resistance to compliance. .    Objective #B  Patient will identify 3 fears / thoughts that contribute to feeling anxious.  Status: Continued - Date(s): 8/2/23    Intervention(s)  Therapist will  educate patient on treatment options, clarify concerns, work with pt to overcome any resistance to compliance. .    Objective #C  Patient will identify 3 initial signs or symptoms of anxiety.  Status: Continued - Date(s): 8/2/23    Intervention(s)  Therapist will  educate patient on treatment options, clarify concerns, work with pt to overcome any resistance to compliance. .        Patient has reviewed and agreed to the above plan.      DINESH Whipple  November 8, 2023  Answers for HPI/ROS submitted by the patient on 12/6/2022  TIFFANY 7 TOTAL SCORE: 3  Answers for HPI/ROS submitted by the patient on 3/8/2023  If you checked off any problems, how difficult have these problems made it for you to do your work, take care of things at home, or get along with other people?: Not difficult at all  PHQ9 TOTAL SCORE: 5  Answers for HPI/ROS submitted by the patient on 4/26/2023  If you checked off any problems, how difficult have these problems made it  for you to do your work, take care of things at home, or get along with other people?: Somewhat difficult  PHQ9 TOTAL SCORE: 1  Answers for HPI/ROS submitted by the patient on 6/12/2023  If you checked off any problems, how difficult have these problems made it for you to do your work, take care of things at home, or get along with other people?: Not difficult at all  PHQ9 TOTAL SCORE: 1  Answers for HPI/ROS submitted by the patient on 7/18/2023  If you checked off any problems, how difficult have these problems made it for you to do your work, take care of things at home, or get along with other people?: Not difficult at all  PHQ9 TOTAL SCORE: 1Answers submitted by the patient for this visit:  Patient Health Questionnaire (Submitted on 8/27/2023)  If you checked off any problems, how difficult have these problems made it for you to do your work, take care of things at home, or get along with other people?: Not difficult at all  PHQ9 TOTAL SCORE: 1  Answers submitted by the patient for this visit:  Patient Health Questionnaire (Submitted on 10/11/2023)  PHQ9 TOTAL SCORE: 0

## 2023-11-08 ENCOUNTER — VIRTUAL VISIT (OUTPATIENT)
Dept: BEHAVIORAL HEALTH | Facility: CLINIC | Age: 68
End: 2023-11-08
Payer: COMMERCIAL

## 2023-11-08 DIAGNOSIS — G47.00 INSOMNIA, UNSPECIFIED TYPE: ICD-10-CM

## 2023-11-08 DIAGNOSIS — F39 MOOD DISORDER (H): ICD-10-CM

## 2023-11-08 DIAGNOSIS — F41.1 GENERALIZED ANXIETY DISORDER: Primary | ICD-10-CM

## 2023-11-08 PROCEDURE — 90834 PSYTX W PT 45 MINUTES: CPT | Mod: 95 | Performed by: SOCIAL WORKER

## 2023-11-14 ENCOUNTER — PATIENT OUTREACH (OUTPATIENT)
Dept: CARE COORDINATION | Facility: CLINIC | Age: 68
End: 2023-11-14
Payer: COMMERCIAL

## 2023-11-16 NOTE — PROGRESS NOTES
[facility] Integrated Behavioral Health  November 20, 2023      Behavioral Health Clinician Progress Note    Patient Name: Jolynn Haji           Service Type:  Individual      Service Location:   MyChart / Email (patient reached)     Session Start Time: 10:00 am   Session End Time: 10:48  am      Session Length: 38 - 52      Attendees: Patient     Service Modality:  Video Visit:      Provider verified identity through the following two step process.  Patient provided:  Patient is known previously to provider    Telemedicine Visit: The patient's condition can be safely assessed and treated via synchronous audio and visual telemedicine encounter.      Reason for Telemedicine Visit: Patient has requested telehealth visit    Originating Site (Patient Location): Patient's home    Distant Site (Provider Location): [facility]    Consent:  The patient/guardian has verbally consented to: the potential risks and benefits of telemedicine (video visit) versus in person care; bill my insurance or make self-payment for services provided; and responsibility for payment of non-covered services.     Patient would like the video invitation sent by:  My Chart    Mode of Communication:  Video Conference via [employer]    As the provider I attest to compliance with applicable laws and regulations related to telemedicine.    Visit Activities (Refresh list every visit): [facility] Only    Diagnostic Assessment Date: 9/27/22  Treatment Plan Review Date: 2/20/24  See Flowsheets for today's PHQ-9 and TIFFANY-7 results  Previous PHQ-9:       8/27/2023     6:46 PM 9/27/2023     4:53 PM 10/11/2023    11:51 AM   PHQ-9 SCORE   PHQ-9 Total Score [facility] 1 (Minimal depression) 0 0   PHQ-9 Total Score 1 0 0     Previous TIFFANY-7:       7/11/2023     7:38 PM 9/25/2023     3:40 PM 9/27/2023     4:53 PM   TIFFANY-7 SCORE   Total Score 1 (minimal anxiety) 1 (minimal anxiety) 1 (minimal anxiety)   Total Score 1  "1 1       KARINA LEVEL:      2010     3:00 PM 2011    11:00 AM   KARINA Score (Last Two)   KARINA Raw Score 51 49   Activation Score 91.6 82.8   KARINA Level 4 4       DATA  Extended Session (60+ minutes): No  Interactive Complexity: No  Crisis: No  [facility] Patient: No    Treatment Objective(s) Addressed in This Session:  Target Behavior(s):  anxiety and depression    Depressed Mood: Increase interest, engagement, and pleasure in doing things  Decrease frequency and intensity of feeling down, depressed, hopeless  Improve quantity and quality of night time sleep / decrease daytime naps  Feel less tired and more energy during the day   Improve diet, appetite, mindful eating, and / or meal planning  Identify negative self-talk and behaviors: challenge core beliefs, myths, and actions  Improve concentration, focus, and mindfulness in daily activities   Feel less fidgety, restless or slow in daily activities / interpersonal interactions  Anxiety: will experience a reduction in anxiety, will develop more effective coping skills to manage anxiety symptoms, will develop healthy cognitive patterns and beliefs and will increase ability to function adaptively    Current Stressors / Issues:  Reports she has been doing really well. States she is ready for Thanksgiving. Reports a more stressful week last week with family . Had some anxiety with the amount people being present. The large group and noise was a bit more difficult for her. Discussed anticipation with anxiety and how it effects her overall. Discussed recovery from high anxiety events.    23  Reports doing \"really well\", reports possibly the best she has been since working with [facility]. She is feeling overall very healthy. Discussed time spent with grand children. Reports she will be seeing her brother later today who has struggled with drinking for many years. Discussed how she emotionally manages this relationship and setting boundaries.     10/25/23  Reports " headaches have improved a lot. 1.5 wks without headaches. Reports having very little anxiety when hosting card club with friends. Overall feeling anxiety and mental health has been really good over the past few days.     10/12/23  Reports she is doing well. Discussed appts with providers going well. Has continued to have headaches. Does not want to make med changes around the holiday. Discussed planning a party for friends at her house.     9/26/23  Anxiety has been pretty good for the past week. Managing minor stressors better. Continues to have headaches frequently. Sleep continues to be good. Had one day of depressed mood but passed. Experienced some anxiety of possibly depressed mood coming on. Processed feels around this. Discussed self care and taking time for self.     9/18/23  Reports she has been doing pretty well. Having more frequent anxiety but not to the point of not being able to keep using skills. Continues to have headaches. Reports with closing the cabin and end of the summer, causes anxiety to be higher. She has been entertaining more frequently as well. The holidays coming has been somewhat more anxiety provoking. Discussed being mindful of self care and living presently.     9/7/23  Reports finishing up summer projects. Minimal anxiety as of late. Had a more difficult day yesterday with anxiety but it had passed. She was having some feelings of brain fog also. Sleep continues to be very good. Headaches are less frequent/severe but do persist. Discussed desire to be off meds completely as ultimate goal. Nothing she plans to rush into.       8/21/23  Reporting more good days than bad days. Having more anxiety days since cutting back on medications. She has not been altering her behaviors in life. Preparing for Labor day weekend with family. She is having minimal anxiety. Reinforced skills and continued practice. States she has been reading recommended books with other suggestions for tx of anxiety.  She has been enjoying trying different tech as well.     8/16/23  Reports weaning off some of her anxiety medications in attempts to reduce headaches. Has been not been noticing much of a change in her anxiety. She has been working on increasing old activities she had been doing over a year ago prior experiencing high anxiety. Reports one panic attack related to making many decisions and a hectic situation. Attempted to do breathing tech to self sooth. Reports rapid heart rate lasted almost the rest of day despite use of multiple techs. Discussed nature of anxiety and educated on how panic attacks effect on overall system with in the body. Discussed being easy on self and avoiding self  of progress with anxiety.     8/2/23  Reporting spending time with grand children which is always refreshing to her. She has been volunteering for a summer school program. Reporting having a larger groups adults causes her anxiety/headaches. Discussed saying no as part of self care. She has been extending self more to engage in things she likes but has been noting more tiredness and anxiety.       Progress on Treatment Objective(s) / Homework:  Satisfactory progress - ACTION (Actively working towards change); Intervened by reinforcing change plan / affirming steps taken    Motivational Interviewing    MI Intervention: Expressed Empathy/Understanding, Supported Autonomy, Collaboration, Evocation, Open-ended questions, Change talk (evoked) and Reframe     Change Talk Expressed by the Patient: Desire to change Ability to change Reasons to change Need to change    Provider Response to Change Talk: E - Evoked more info from patient about behavior change, A - Affirmed patient's thoughts, decisions, or attempts at behavior change, R - Reflected patient's change talk and S - Summarized patient's change talk statements      Care Plan review completed: Yes    Medication Review:  No changes to current psychiatric  medication(s)    Medication Compliance:  Yes    Changes in Health Issues:   None reported    Chemical Use Review:   Substance Use: Chemical use reviewed, no active concerns identified      Tobacco Use: No current tobacco use.      Assessment: Current Emotional / Mental Status (status of significant symptoms):  Risk status (Self / Other harm or suicidal ideation)  Patient denies a history of suicidal ideation, suicide attempts, self-injurious behavior, homicidal ideation, homicidal behavior and and other safety concerns  Patient denies current fears or concerns for personal safety.  Patient denies current or recent suicidal ideation or behaviors.  Patient denies current or recent homicidal ideation or behaviors.  Patient denies current or recent self injurious behavior or ideation.  Patient denies other safety concerns.  A safety and risk management plan has not been developed at this time, however patient was encouraged to call [place] [phone] should there be a change in any of these risk factors.    Appearance:   Appropriate   Eye Contact:   Good   Psychomotor Behavior: Normal   Attitude:   Cooperative   Orientation:   All  Speech   Rate / Production: Normal    Volume:  Normal   Mood:    Anxious   Affect:    Appropriate   Thought Content:  Clear   Thought Form:  Coherent  Logical   Insight:    Good     Diagnoses:  1. Generalized anxiety disorder    2. Mood disorder (H24)    3. Insomnia, unspecified type        Collateral Reports Completed:  Not Applicable    Plan: (Homework, other):  Patient was given information about behavioral services and encouraged to schedule a follow up appointment with the clinic [facility] in 1 week.  She was also given information about mental health symptoms and treatment options .  CD Recommendations: No indications of CD issues.   Erika STARK [facility]      ______________________________________________________________________    Integrated Primary Care Behavioral Health Treatment  Plan    Patient's Name: Jolynn Haji  YOB: 1955    Date of Creation: 10/5/22  Date Treatment Plan Last Reviewed/Revised: 11/20/23    DSM5 Diagnoses: 300.02 (F41.1) Generalized Anxiety Disorder  Psychosocial / Contextual Factors: Individual Factors high anxiety associated with depressive sx, interfereing with ability to function as she would like.  .  PROMIS (reviewed every 90 days):     Referral / Collaboration:  Was/were discussed and patient will pursue. - individual therapy    Anticipated number of session for this episode of care: 5-8 sessions  Anticipation frequency of session: Weekly  Anticipated Duration of each session: 38-52 minutes  Treatment plan will be reviewed in 90 days or when goals have been changed.       MeasurableTreatment Goal(s) related to diagnosis / functional impairment(s)  Goal 1: Patient will work with provider to manage symptoms    I will know I've met my goal when when less anxious and feeling down.      Objective #A (Patient Action)    Patient will   attend all sessions .  Status: Continued - Date(s): 11/20/23    Intervention(s)  Therapist will teach  educate patient on treatment options, clarify concerns, work with pt to overcome any resistance to compliance. .    Objective #B  Patient will identify 3 fears / thoughts that contribute to feeling anxious.  Status: Continued - Date(s): 11/20/23    Intervention(s)  Therapist will  educate patient on treatment options, clarify concerns, work with pt to overcome any resistance to compliance. .    Objective #C  Patient will identify 3 initial signs or symptoms of anxiety.  Status: Continued - Date(s): 11/20/23    Intervention(s)  Therapist will  educate patient on treatment options, clarify concerns, work with pt to overcome any resistance to compliance. .        Patient has reviewed and agreed to the above plan.      Erika Francisco, [facility]  November 20, 2023  Answers for HPI/ROS submitted by the patient on 12/6/2022  TIFFANY 7 TOTAL  SCORE: 3  Answers for HPI/ROS submitted by the patient on 3/8/2023  If you checked off any problems, how difficult have these problems made it for you to do your work, take care of things at home, or get along with other people?: Not difficult at all  PHQ9 TOTAL SCORE: 5  Answers for HPI/ROS submitted by the patient on 4/26/2023  If you checked off any problems, how difficult have these problems made it for you to do your work, take care of things at home, or get along with other people?: Somewhat difficult  PHQ9 TOTAL SCORE: 1  Answers for HPI/ROS submitted by the patient on 6/12/2023  If you checked off any problems, how difficult have these problems made it for you to do your work, take care of things at home, or get along with other people?: Not difficult at all  PHQ9 TOTAL SCORE: 1  Answers for HPI/ROS submitted by the patient on 7/18/2023  If you checked off any problems, how difficult have these problems made it for you to do your work, take care of things at home, or get along with other people?: Not difficult at all  PHQ9 TOTAL SCORE: 1Answers submitted by the patient for this visit:  Patient Health Questionnaire (Submitted on 8/27/2023)  If you checked off any problems, how difficult have these problems made it for you to do your work, take care of things at home, or get along with other people?: Not difficult at all  PHQ9 TOTAL SCORE: 1  Answers submitted by the patient for this visit:  Patient Health Questionnaire (Submitted on 10/11/2023)  PHQ9 TOTAL SCORE: 0    Answers submitted by the patient for this visit:  Patient Health Questionnaire (Submitted on 11/20/2023)  If you checked off any problems, how difficult have these problems made it for you to do your work, take care of things at home, or get along with other people?: Not difficult at all  PHQ9 TOTAL SCORE: 0

## 2023-11-20 ENCOUNTER — VIRTUAL VISIT (OUTPATIENT)
Dept: BEHAVIORAL HEALTH | Facility: CLINIC | Age: 68
End: 2023-11-20
Payer: COMMERCIAL

## 2023-11-20 DIAGNOSIS — F41.1 GENERALIZED ANXIETY DISORDER: Primary | ICD-10-CM

## 2023-11-20 DIAGNOSIS — G47.00 INSOMNIA, UNSPECIFIED TYPE: ICD-10-CM

## 2023-11-20 DIAGNOSIS — F39 MOOD DISORDER (H): ICD-10-CM

## 2023-11-20 PROCEDURE — 90834 PSYTX W PT 45 MINUTES: CPT | Mod: 95 | Performed by: SOCIAL WORKER

## 2023-11-20 ASSESSMENT — PATIENT HEALTH QUESTIONNAIRE - PHQ9
SUM OF ALL RESPONSES TO PHQ QUESTIONS 1-9: 0
SUM OF ALL RESPONSES TO PHQ QUESTIONS 1-9: 0
10. IF YOU CHECKED OFF ANY PROBLEMS, HOW DIFFICULT HAVE THESE PROBLEMS MADE IT FOR YOU TO DO YOUR WORK, TAKE CARE OF THINGS AT HOME, OR GET ALONG WITH OTHER PEOPLE: NOT DIFFICULT AT ALL

## 2023-12-01 NOTE — PROGRESS NOTES
[facility]  December 4, 2023      Behavioral Health Clinician Progress Note    Patient Name: Jolynn Haji           Service Type:  Individual      Service Location:   MyChart / Email (patient reached)     Session Start Time: 10:00 am   Session End Time: 10:32  am      Session Length: 16 - 37      Attendees: Patient     Service Modality:  Video Visit:      Provider verified identity through the following two step process.  Patient provided:  Patient is known previously to provider    Telemedicine Visit: The patient's condition can be safely assessed and treated via synchronous audio and visual telemedicine encounter.      Reason for Telemedicine Visit: Patient has requested telehealth visit    Originating Site (Patient Location): Patient's home    Distant Site (Provider Location): [facility]    Consent:  The patient/guardian has verbally consented to: the potential risks and benefits of telemedicine (video visit) versus in person care; bill my insurance or make self-payment for services provided; and responsibility for payment of non-covered services.     Patient would like the video invitation sent by:  My Chart    Mode of Communication:  Video Conference via [facility]    As the provider I attest to compliance with applicable laws and regulations related to telemedicine.    Visit Activities (Refresh list every visit): [facility] Only    Diagnostic Assessment Date: 9/27/22  Treatment Plan Review Date: 2/20/24  See Flowsheets for today's PHQ-9 and TIFFANY-7 results  Previous PHQ-9:       9/27/2023     4:53 PM 10/11/2023    11:51 AM 11/20/2023     9:51 AM   PHQ-9 SCORE   PHQ-9 Total Score MyChart 0 0 0   PHQ-9 Total Score 0 0 0     Previous TIFFANY-7:       7/11/2023     7:38 PM 9/25/2023     3:40 PM 9/27/2023     4:53 PM   TIFFANY-7 SCORE   Total Score 1 (minimal anxiety) 1 (minimal anxiety) 1 (minimal anxiety)   Total Score 1 1 1       KARINA  LEVEL:      2010     3:00 PM 2011    11:00 AM   KARINA Score (Last Two)   KARINA Raw Score 51 49   Activation Score 91.6 82.8   KARINA Level 4 4       DATA  Extended Session (60+ minutes): No  Interactive Complexity: No  Crisis: No  [facility] Patient: No    Treatment Objective(s) Addressed in This Session:  Target Behavior(s):  anxiety and depression    Depressed Mood: Increase interest, engagement, and pleasure in doing things  Decrease frequency and intensity of feeling down, depressed, hopeless  Improve quantity and quality of night time sleep / decrease daytime naps  Feel less tired and more energy during the day   Improve diet, appetite, mindful eating, and / or meal planning  Identify negative self-talk and behaviors: challenge core beliefs, myths, and actions  Improve concentration, focus, and mindfulness in daily activities   Feel less fidgety, restless or slow in daily activities / interpersonal interactions  Anxiety: will experience a reduction in anxiety, will develop more effective coping skills to manage anxiety symptoms, will develop healthy cognitive patterns and beliefs and will increase ability to function adaptively    Current Stressors / Issues:  She had a wonderful Thanksgiving. Went to the [employer] [place] [employer] this weekend with minimal anxiety. Reports an additional death in the family of a close cousin. Feeling more prepared for this . Has had some anticipation of anxiety due to last . Discussed preparation and acceptance for the event. Feeling more prepared this year with Dieudonne.     23  Reports she has been doing really well. States she is ready for Thanksgiving. Reports a more stressful week last week with family . Had some anxiety with the amount people being present. The large group and noise was a bit more difficult for her. Discussed anticipation with anxiety and how it effects her overall. Discussed recovery from high anxiety events.    23  Reports doing  "\"really well\", reports possibly the best she has been since working with [facility]. She is feeling overall very healthy. Discussed time spent with grand children. Reports she will be seeing her brother later today who has struggled with drinking for many years. Discussed how she emotionally manages this relationship and setting boundaries.     10/25/23  Reports headaches have improved a lot. 1.5 wks without headaches. Reports having very little anxiety when hosting card club with friends. Overall feeling anxiety and mental health has been really good over the past few days.     10/12/23  Reports she is doing well. Discussed appts with providers going well. Has continued to have headaches. Does not want to make med changes around the holiday. Discussed planning a party for friends at her house.     9/26/23  Anxiety has been pretty good for the past week. Managing minor stressors better. Continues to have headaches frequently. Sleep continues to be good. Had one day of depressed mood but passed. Experienced some anxiety of possibly depressed mood coming on. Processed feels around this. Discussed self care and taking time for self.     9/18/23  Reports she has been doing pretty well. Having more frequent anxiety but not to the point of not being able to keep using skills. Continues to have headaches. Reports with closing the cabin and end of the summer, causes anxiety to be higher. She has been entertaining more frequently as well. The holidays coming has been somewhat more anxiety provoking. Discussed being mindful of self care and living presently.     9/7/23  Reports finishing up summer projects. Minimal anxiety as of late. Had a more difficult day yesterday with anxiety but it had passed. She was having some feelings of brain fog also. Sleep continues to be very good. Headaches are less frequent/severe but do persist. Discussed desire to be off meds completely as ultimate goal. Nothing she plans to rush into.   "     8/21/23  Reporting more good days than bad days. Having more anxiety days since cutting back on medications. She has not been altering her behaviors in life. Preparing for Labor day weekend with family. She is having minimal anxiety. Reinforced skills and continued practice. States she has been reading recommended books with other suggestions for tx of anxiety. She has been enjoying trying different tech as well.     8/16/23  Reports weaning off some of her anxiety medications in attempts to reduce headaches. Has been not been noticing much of a change in her anxiety. She has been working on increasing old activities she had been doing over a year ago prior experiencing high anxiety. Reports one panic attack related to making many decisions and a hectic situation. Attempted to do breathing tech to self sooth. Reports rapid heart rate lasted almost the rest of day despite use of multiple techs. Discussed nature of anxiety and educated on how panic attacks effect on overall system with in the body. Discussed being easy on self and avoiding self  of progress with anxiety.     8/2/23  Reporting spending time with grand children which is always refreshing to her. She has been volunteering for a summer school program. Reporting having a larger groups adults causes her anxiety/headaches. Discussed saying no as part of self care. She has been extending self more to engage in things she likes but has been noting more tiredness and anxiety.       Progress on Treatment Objective(s) / Homework:  Satisfactory progress - ACTION (Actively working towards change); Intervened by reinforcing change plan / affirming steps taken    Motivational Interviewing    MI Intervention: Expressed Empathy/Understanding, Supported Autonomy, Collaboration, Evocation, Open-ended questions, Change talk (evoked) and Reframe     Change Talk Expressed by the Patient: Desire to change Ability to change Reasons to change Need to  change    Provider Response to Change Talk: E - Evoked more info from patient about behavior change, A - Affirmed patient's thoughts, decisions, or attempts at behavior change, R - Reflected patient's change talk and S - Summarized patient's change talk statements      Care Plan review completed: Yes    Medication Review:  No changes to current psychiatric medication(s)    Medication Compliance:  Yes    Changes in Health Issues:   None reported    Chemical Use Review:   Substance Use: Chemical use reviewed, no active concerns identified      Tobacco Use: No current tobacco use.      Assessment: Current Emotional / Mental Status (status of significant symptoms):  Risk status (Self / Other harm or suicidal ideation)  Patient denies a history of suicidal ideation, suicide attempts, self-injurious behavior, homicidal ideation, homicidal behavior and and other safety concerns  Patient denies current fears or concerns for personal safety.  Patient denies current or recent suicidal ideation or behaviors.  Patient denies current or recent homicidal ideation or behaviors.  Patient denies current or recent self injurious behavior or ideation.  Patient denies other safety concerns.  A safety and risk management plan has not been developed at this time, however patient was encouraged to call [place] [phone] should there be a change in any of these risk factors.    Appearance:   Appropriate   Eye Contact:   Good   Psychomotor Behavior: Normal   Attitude:   Cooperative   Orientation:   All  Speech   Rate / Production: Normal    Volume:  Normal   Mood:    Anxious   Affect:    Appropriate   Thought Content:  Clear   Thought Form:  Coherent  Logical   Insight:    Good     Diagnoses:  1. Generalized anxiety disorder    2. Mood disorder (H24)    3. Insomnia, unspecified type          Collateral Reports Completed:  Not Applicable    Plan: (Homework, other):  Patient was given information about behavioral services and encouraged to  schedule a follow up appointment with the clinic [facility] in 1 week.  She was also given information about mental health symptoms and treatment options .  CD Recommendations: No indications of CD issues.   Erika Francisco MSW LICSW      ______________________________________________________________________    Integrated Primary Care Behavioral Health Treatment Plan    Patient's Name: Jolynn Haji  YOB: 1955    Date of Creation: 10/5/22  Date Treatment Plan Last Reviewed/Revised: 11/20/23    DSM5 Diagnoses: 300.02 (F41.1) Generalized Anxiety Disorder  Psychosocial / Contextual Factors: Individual Factors high anxiety associated with depressive sx, interfereing with ability to function as she would like.  .  PROMIS (reviewed every 90 days):     Referral / Collaboration:  Was/were discussed and patient will pursue. - individual therapy    Anticipated number of session for this episode of care: 5-8 sessions  Anticipation frequency of session: Weekly  Anticipated Duration of each session: 38-52 minutes  Treatment plan will be reviewed in 90 days or when goals have been changed.       MeasurableTreatment Goal(s) related to diagnosis / functional impairment(s)  Goal 1: Patient will work with provider to manage symptoms    I will know I've met my goal when when less anxious and feeling down.      Objective #A (Patient Action)    Patient will   attend all sessions .  Status: Continued - Date(s): 11/20/23    Intervention(s)  Therapist will teach  educate patient on treatment options, clarify concerns, work with pt to overcome any resistance to compliance. .    Objective #B  Patient will identify 3 fears / thoughts that contribute to feeling anxious.  Status: Continued - Date(s): 11/20/23    Intervention(s)  Therapist will  educate patient on treatment options, clarify concerns, work with pt to overcome any resistance to compliance. .    Objective #C  Patient will identify 3 initial signs or symptoms of anxiety.  Status:  Continued - Date(s): 11/20/23    Intervention(s)  Therapist will  educate patient on treatment options, clarify concerns, work with pt to overcome any resistance to compliance. .        Patient has reviewed and agreed to the above plan.      DINESH Whipple  December 4, 2023  Answers for HPI/ROS submitted by the patient on 12/6/2022  TIFFANY 7 TOTAL SCORE: 3  Answers for HPI/ROS submitted by the patient on 3/8/2023  If you checked off any problems, how difficult have these problems made it for you to do your work, take care of things at home, or get along with other people?: Not difficult at all  PHQ9 TOTAL SCORE: 5  Answers for HPI/ROS submitted by the patient on 4/26/2023  If you checked off any problems, how difficult have these problems made it for you to do your work, take care of things at home, or get along with other people?: Somewhat difficult  PHQ9 TOTAL SCORE: 1  Answers for HPI/ROS submitted by the patient on 6/12/2023  If you checked off any problems, how difficult have these problems made it for you to do your work, take care of things at home, or get along with other people?: Not difficult at all  PHQ9 TOTAL SCORE: 1  Answers for HPI/ROS submitted by the patient on 7/18/2023  If you checked off any problems, how difficult have these problems made it for you to do your work, take care of things at home, or get along with other people?: Not difficult at all  PHQ9 TOTAL SCORE: 1Answers submitted by the patient for this visit:  Patient Health Questionnaire (Submitted on 8/27/2023)  If you checked off any problems, how difficult have these problems made it for you to do your work, take care of things at home, or get along with other people?: Not difficult at all  PHQ9 TOTAL SCORE: 1  Answers submitted by the patient for this visit:  Patient Health Questionnaire (Submitted on 10/11/2023)  PHQ9 TOTAL SCORE: 0    Answers submitted by the patient for this visit:  Patient Health Questionnaire (Submitted on  11/20/2023)  If you checked off any problems, how difficult have these problems made it for you to do your work, take care of things at home, or get along with other people?: Not difficult at all  PHQ9 TOTAL SCORE: 0

## 2023-12-04 ENCOUNTER — VIRTUAL VISIT (OUTPATIENT)
Dept: BEHAVIORAL HEALTH | Facility: CLINIC | Age: 68
End: 2023-12-04
Payer: COMMERCIAL

## 2023-12-04 DIAGNOSIS — F39 MOOD DISORDER (H): ICD-10-CM

## 2023-12-04 DIAGNOSIS — F41.1 GENERALIZED ANXIETY DISORDER: Primary | ICD-10-CM

## 2023-12-04 DIAGNOSIS — G47.00 INSOMNIA, UNSPECIFIED TYPE: ICD-10-CM

## 2023-12-04 PROCEDURE — 90832 PSYTX W PT 30 MINUTES: CPT | Mod: 95 | Performed by: SOCIAL WORKER

## 2023-12-04 ASSESSMENT — ANXIETY QUESTIONNAIRES
7. FEELING AFRAID AS IF SOMETHING AWFUL MIGHT HAPPEN: NOT AT ALL
6. BECOMING EASILY ANNOYED OR IRRITABLE: NOT AT ALL
1. FEELING NERVOUS, ANXIOUS, OR ON EDGE: SEVERAL DAYS
GAD7 TOTAL SCORE: 1
GAD7 TOTAL SCORE: 1
3. WORRYING TOO MUCH ABOUT DIFFERENT THINGS: NOT AT ALL
5. BEING SO RESTLESS THAT IT IS HARD TO SIT STILL: NOT AT ALL
4. TROUBLE RELAXING: NOT AT ALL
2. NOT BEING ABLE TO STOP OR CONTROL WORRYING: NOT AT ALL
7. FEELING AFRAID AS IF SOMETHING AWFUL MIGHT HAPPEN: NOT AT ALL

## 2023-12-06 ENCOUNTER — OFFICE VISIT (OUTPATIENT)
Dept: FAMILY MEDICINE | Facility: CLINIC | Age: 68
End: 2023-12-06
Payer: COMMERCIAL

## 2023-12-06 VITALS
RESPIRATION RATE: 16 BRPM | SYSTOLIC BLOOD PRESSURE: 110 MMHG | HEIGHT: 68 IN | DIASTOLIC BLOOD PRESSURE: 70 MMHG | OXYGEN SATURATION: 99 % | TEMPERATURE: 98.2 F | WEIGHT: 147 LBS | BODY MASS INDEX: 22.28 KG/M2 | HEART RATE: 83 BPM

## 2023-12-06 DIAGNOSIS — F43.23 ADJUSTMENT DISORDER WITH MIXED ANXIETY AND DEPRESSED MOOD: ICD-10-CM

## 2023-12-06 DIAGNOSIS — M17.5 OTHER SECONDARY OSTEOARTHRITIS OF LEFT KNEE: ICD-10-CM

## 2023-12-06 DIAGNOSIS — F51.02 ADJUSTMENT INSOMNIA: ICD-10-CM

## 2023-12-06 DIAGNOSIS — Z12.31 VISIT FOR SCREENING MAMMOGRAM: ICD-10-CM

## 2023-12-06 DIAGNOSIS — I10 ESSENTIAL HYPERTENSION WITH GOAL BLOOD PRESSURE LESS THAN 140/90: ICD-10-CM

## 2023-12-06 DIAGNOSIS — F33.1 MAJOR DEPRESSIVE DISORDER, RECURRENT EPISODE, MODERATE (H): Primary | ICD-10-CM

## 2023-12-06 DIAGNOSIS — L71.9 ROSACEA: ICD-10-CM

## 2023-12-06 PROCEDURE — 20610 DRAIN/INJ JOINT/BURSA W/O US: CPT | Performed by: FAMILY MEDICINE

## 2023-12-06 PROCEDURE — 99214 OFFICE O/P EST MOD 30 MIN: CPT | Mod: 25 | Performed by: FAMILY MEDICINE

## 2023-12-06 RX ORDER — TRIAMCINOLONE ACETONIDE 40 MG/ML
40 INJECTION, SUSPENSION INTRA-ARTICULAR; INTRAMUSCULAR ONCE
Status: COMPLETED | OUTPATIENT
Start: 2023-12-06 | End: 2023-12-06

## 2023-12-06 RX ORDER — LISINOPRIL 2.5 MG/1
2.5 TABLET ORAL DAILY
Qty: 90 TABLET | Refills: 3 | Status: SHIPPED | OUTPATIENT
Start: 2023-12-06 | End: 2024-02-05

## 2023-12-06 RX ORDER — RESPIRATORY SYNCYTIAL VIRUS VACCINE 120MCG/0.5
0.5 KIT INTRAMUSCULAR ONCE
Qty: 1 EACH | Refills: 0 | Status: CANCELLED | OUTPATIENT
Start: 2023-12-06 | End: 2023-12-06

## 2023-12-06 RX ORDER — CLINDAMYCIN PHOSPHATE 10 UG/ML
LOTION TOPICAL 2 TIMES DAILY
Qty: 60 ML | Refills: 3 | Status: SHIPPED | OUTPATIENT
Start: 2023-12-06

## 2023-12-06 RX ORDER — LIDOCAINE HYDROCHLORIDE AND EPINEPHRINE 10; 10 MG/ML; UG/ML
4 INJECTION, SOLUTION INFILTRATION; PERINEURAL ONCE
Status: DISCONTINUED | OUTPATIENT
Start: 2023-12-06 | End: 2023-12-06

## 2023-12-06 RX ADMIN — TRIAMCINOLONE ACETONIDE 40 MG: 40 INJECTION, SUSPENSION INTRA-ARTICULAR; INTRAMUSCULAR at 18:30

## 2023-12-06 ASSESSMENT — ANXIETY QUESTIONNAIRES
GAD7 TOTAL SCORE: 1
GAD7 TOTAL SCORE: 1
2. NOT BEING ABLE TO STOP OR CONTROL WORRYING: NOT AT ALL
1. FEELING NERVOUS, ANXIOUS, OR ON EDGE: SEVERAL DAYS
7. FEELING AFRAID AS IF SOMETHING AWFUL MIGHT HAPPEN: NOT AT ALL
GAD7 TOTAL SCORE: 1
4. TROUBLE RELAXING: NOT AT ALL
5. BEING SO RESTLESS THAT IT IS HARD TO SIT STILL: NOT AT ALL
3. WORRYING TOO MUCH ABOUT DIFFERENT THINGS: NOT AT ALL
7. FEELING AFRAID AS IF SOMETHING AWFUL MIGHT HAPPEN: NOT AT ALL
6. BECOMING EASILY ANNOYED OR IRRITABLE: NOT AT ALL

## 2023-12-06 NOTE — PROGRESS NOTES
Assessment & Plan :       ICD-10-CM    1. Major depressive disorder, recurrent episode, moderate (H)  F33.1       2. Adjustment disorder with mixed anxiety and depressed mood- mild-moderate  at this time  F43.23       3. Adjustment insomnia - resolved as of 5/5/2023 - off of ambien with psychiatry's okay   F51.02       4. Rosacea - needs medication refill - desires to change from clindamycin 1% gel to cream  L71.9 clindamycin (CLEOCIN T) 1 % external lotion      5. Essential hypertension with goal blood pressure less than 140/90  I10 lisinopril (ZESTRIL) 2.5 MG tablet      6. Other secondary osteoarthritis of left knee-Primary osteoarthritis of left knee- pt desires left knee joint injection for pain- last one 10/2022  M17.5 DRAIN/INJECT LARGE JOINT/BURSA     triamcinolone (KENALOG-40) injection 40 mg     lidocaine 1 % 4 mL      7. Visit for screening mammogram  Z12.31 MA Screening Bilateral w/ Ric     CANCELED: MA Screening Bilateral w/ Ric          Future Appointments 12/6/2023 - 6/3/2024        Date Visit Type Length Department Provider     12/20/2023 10:00 AM C RETURN 60 min  BEHAVIORAL HEALTH Erika Francisco, LICSW    Location Instructions:     Your appointment is at [facility] located in the [facility] at [address] [place] [address]. Please check in at the  in [address].              1/9/2024 11:30 AM [facility] ADULT PSYCHIATRY RETURN 30 min [facility] Judy Pompa DO    Location Instructions:     [facility] has 2 buildings on its campus. Please visit us at [address], [place] and enter the building with the numbers [phone] on it.               2/5/2024  8:40 AM ANNUAL WELLNESS 40 min  FAMILY PRACTICE Helga Ibarra MD    Location Instructions:     [facility] is located at [address], [address] Free parking is available; access the lot by turning north from  [address] onto [address], then west onto [address].              2/22/2024 11:15 AM RETURN 15 min [facility] CampSheldon villanueva MD    Location Instructions:     [address] [id number]                  Continue with current plan for meds and therapy.   Return in 2 months (on 2/5/2024) for Wellness/Preventative Visit, depression, anxiety, blood pressure.       Ordering of each unique test  Prescription drug management  55 minutes spent by me on the date of the encounter doing chart review, history and exam, documentation and further activities per the note       MEDICATIONS:   Orders Placed This Encounter   Medications    clindamycin (CLEOCIN T) 1 % external lotion     Sig: Apply topically 2 times daily     Dispense:  60 mL     Refill:  3    lisinopril (ZESTRIL) 2.5 MG tablet     Sig: Take 1 tablet (2.5 mg) by mouth daily     Dispense:  90 tablet     Refill:  3     pt prefers the 2.5mg tabs so she doesn't have to cut the 5mg in half    triamcinolone (KENALOG-40) injection 40 mg    DISCONTD: lidocaine 1% with EPINEPHrine 1:100,000 injection 4 mL    lidocaine 1 % 4 mL          - Continue other medications without change  Regular exercise  See Patient Instructions    Helga Ibarra MD  [facility]    Chanelle Tristan is a 68 year old, presenting for the following health issues:  Recheck Medication and injection in left knee      12/6/2023     5:04 PM   Additional Questions   Roomed by christi voss       History of Present Illness       Mental Health Follow-up:  Patient presents to follow-up on Anxiety.    Patient's anxiety since last visit has been:  Good  The patient is not having other symptoms associated with anxiety.  Any significant life events: grief or loss  Patient is feeling anxious or having panic attacks.  Patient has no concerns about alcohol or drug use.    She eats 2-3 servings of fruits and vegetables daily.She consumes 0  sweetened beverage(s) daily.She exercises with enough effort to increase her heart rate 20 to 29 minutes per day.    She is taking medications regularly.     Getting some bad headaches still.  Tries to do deep breathing and mindfulness with the calm lorena or meditation,    Last saw psychiatry - Dr. Pompa 10/10/2023 via video.  Increased her propranolol.     Seeing Erika, her therapist every 2 weeks.  Can't wait for the holidays now. Gets to have her young grandchildren Lb , age 8 and Erasmo, age 4.         Injection in left knee- here for steroid injection to her left knee for osteoarthritis pain.  Reviewed xrays from 7/1/2022 = EXAM: KNEE STANDING AP BILATERAL SUNRISE BILATERAL LATERAL LEFT  DATE/TIME: 7/1/2022 10:55 AM      INDICATION: Left-sided knee pain.   COMPARISON: None.                                                                      IMPRESSION:  1.  Left knee: Moderate degenerative arthrosis, stable. This includes  medial compartment narrowing, patellofemoral compartment narrowing,  tricompartmental marginal osteophytosis. No fracture or joint  effusion. Bone demineralization.  2.  Right knee: Mild degenerative arthrosis. Bone demineralization. No  fracture.     TALA CASTANEDA MD     Discussed with pt here recent increase in pain in the left knee from her osteoarthritis and After risks of bleeding, infection, scarring, permanent disability, tendon and /or ligament rupture, benefits and alternatives were discussed, written  informed consent was obtained for triamcinolone injection and lidocaine injection  to the left knee joint.. This was witnessed by FORTINO Haley CNP  .  Pause for the cause was done for the above procedure with patient  as well.      Review of Systems       Constitutional, HEENT, cardiovascular, pulmonary, GI, , musculoskeletal, neuro, skin, endocrine and psych systems are negative, except as otherwise noted.      Objective    /70   Pulse 83   Temp 98.2  F (36.8  C)    "Resp 16   Ht 1.715 m (5' 7.5\")   Wt 66.7 kg (147 lb)   LMP 06/28/2001   SpO2 99%   BMI 22.68 kg/m    Body mass index is 22.68 kg/m .  Physical Exam   GENERAL: healthy, alert and no distress  EYES: Eyes grossly normal to inspection, PERRL and conjunctivae and sclerae normal  HENT: ear canals and TM's normal, nose and mouth without ulcers or lesions  NECK: no adenopathy, no asymmetry, masses, or scars and thyroid normal to palpation  RESP: lungs clear to auscultation - no rales, rhonchi or wheezes  CV: regular rate and rhythm, normal S1 S2, no S3 or S4, no murmur, click or rub, no peripheral edema and peripheral pulses strong  ABDOMEN: soft, nontender, no hepatosplenomegaly, no masses and bowel sounds normal  MS: no gross musculoskeletal defects noted, no edema  SKIN: no suspicious lesions or rashes  NEURO: Normal strength and tone, mentation intact and speech normal  PSYCH: mentation appears normal, affect normal/bright; Alert and oriented. No acute distress. Appears well-groomed and casually dressed. Affect is normal, not particularly depressed. In good humor and laughs appropriately. Not particularly anxious. No evidence of psychosis.     Office Visit on 09/27/2023   Component Date Value Ref Range Status    Sodium 09/27/2023 140  135 - 145 mmol/L Final    Reference intervals for this test were updated on 09/26/2023 to more accurately reflect our healthy population. There may be differences in the flagging of prior results with similar values performed with this method. Interpretation of those prior results can be made in the context of the updated reference intervals.     Potassium 09/27/2023 4.6  3.4 - 5.3 mmol/L Final    Carbon Dioxide (CO2) 09/27/2023 26  22 - 29 mmol/L Final    Anion Gap 09/27/2023 10  7 - 15 mmol/L Final    Urea Nitrogen 09/27/2023 23.6 (H)  8.0 - 23.0 mg/dL Final    Creatinine 09/27/2023 0.78  0.51 - 0.95 mg/dL Final    GFR Estimate 09/27/2023 82  >60 mL/min/1.73m2 Final    Calcium " 09/27/2023 9.7  8.8 - 10.2 mg/dL Final    Chloride 09/27/2023 104  98 - 107 mmol/L Final    Glucose 09/27/2023 97  70 - 99 mg/dL Final    Alkaline Phosphatase 09/27/2023 83  35 - 104 U/L Final    AST 09/27/2023 19  0 - 45 U/L Final    Reference intervals for this test were updated on 6/12/2023 to more accurately reflect our healthy population. There may be differences in the flagging of prior results with similar values performed with this method. Interpretation of those prior results can be made in the context of the updated reference intervals.    ALT 09/27/2023 15  0 - 50 U/L Final    Reference intervals for this test were updated on 6/12/2023 to more accurately reflect our healthy population. There may be differences in the flagging of prior results with similar values performed with this method. Interpretation of those prior results can be made in the context of the updated reference intervals.      Protein Total 09/27/2023 6.7  6.4 - 8.3 g/dL Final    Albumin 09/27/2023 4.1  3.5 - 5.2 g/dL Final    Bilirubin Total 09/27/2023 0.2  <=1.2 mg/dL Final           Left knee injection. : after prepping area with betadine swabs x 3 and isopropyl alcohol scrub, the most tender point on the left lateral knee joint was injected using a inferolateral apprroach retropatellar in the usual sterile fashion with 0.5cc on Kenalog 40mg/cc and 1.5 cc of 1% lidocaine without epinephrine. Pt tolerated procedure very well and was starting to have some relief of her symptoms before leaving the clinic. No significant bleeding at all. covered with a bandaid.

## 2023-12-07 NOTE — PATIENT INSTRUCTIONS
" [facility]  [address] [place] [address]  Office: [phone]   Fax:    [phone]       Please, call our clinic or go to the ER immediately if signs or symptoms worsen or fail to improve as anticipated.     Thank you so much or choosing [facility]  for your Health Care. It was a pleasure seeing you at your visit today! Please contact us with any questions or concerns you may have.                   Helga Ibarra MD                              To reach your [facility] care team after hours call:   [phone] press #[phone] \"to speak with your care team\".  This will get you to our clinic instead of routing to central [facility]  scheduling.     PLEASE NOTE OUR HOURS HAVE CHANGED secondary to COVID-19 coronavirus pandemic, as we are trying to minimize patient exposure to the virus,  which is now widespread in the [facility].  These hours may change with very little notice.  We apologize for any inconvenience.       Our current clinic hours are:          Monday- Thursday   7:00am - 6:00pm  in person.      Friday  7:00am- 5:00pm                       Saturday and Sunday : Closed to in person and virtual visits        We have telephone and virtual visit times available between    7:00am - 6pm on Monday-Friday as well.                                                Phone:  [phone]      Our pharmacy hours: Monday through Friday 8:00am to 5:00pm                        Saturday - 9:00 am to 12 noon       Sunday : Closed.              Phone:  [phone]              ###  Please note: at this time we are not accepting any walk-in visits. ###      There is also information available at our web site:  [email or web address]    If your provider ordered any lab tests and you do not receive the results within 10 business days, please call the clinic.    If you need a medication refill please contact your pharmacy.  Please allow " 3 business days for your refill to be completed.    Our clinic offers telephone visits and e visits.  Please ask one of your team members to explain more.      Use iWantoohart (secure email communication and access to your chart) to send your primary care provider a message or make an appointment. Ask someone on your Team how to sign up for iWantoohart.                Return in 2 months (on 2/5/2024) for Wellness/Preventative Visit, depression, anxiety, blood pressure.     Future Appointments 12/6/2023 - 6/3/2024        Date Visit Type Length Department Provider     12/20/2023 10:00 AM C RETURN 60 min CS BEHAVIORAL HEALTH Erika Francisco, JOESPHSW    Location Instructions:     Your appointment is at [facility] located in the [facility] at [address] [place] [address]. Please check in at the  in [address].              1/9/2024 11:30 AM CCPS ADULT PSYCHIATRY RETURN 30 min FZ PSYCHIATRY Judy Pompa DO    Location Instructions:     [facility] has 2 buildings on its campus. Please visit us at [address], [place] and enter the building with the numbers [phone] on it.               2/5/2024  8:40 AM ANNUAL WELLNESS 40 min RV FAMILY PRACTICE Helga Ibarra MD    Location Instructions:     [facility] is located at [address], [address] Free parking is available; access the lot by turning north from [address] onto [address], then west onto [address].              2/22/2024 11:15 AM RETURN 15 min OX DERMATOLOGY Sheldon Rogers MD    Location Instructions:     [address] [address]

## 2023-12-18 NOTE — PROGRESS NOTES
[facility] Integrated Behavioral Health  December 20, 2023      Behavioral Health Clinician Progress Note    Patient Name: Jolynn Haji           Service Type:  Individual      Service Location:   MyChart / Email (patient reached)     Session Start Time: 10:00 am   Session End Time: 10:53 am      Session Length: 53 - 60      Attendees: Patient     Service Modality:  Video Visit:      Provider verified identity through the following two step process.  Patient provided:  Patient is known previously to provider    Telemedicine Visit: The patient's condition can be safely assessed and treated via synchronous audio and visual telemedicine encounter.      Reason for Telemedicine Visit: Patient has requested telehealth visit    Originating Site (Patient Location): Patient's home    Distant Site (Provider Location): [facility]    Consent:  The patient/guardian has verbally consented to: the potential risks and benefits of telemedicine (video visit) versus in person care; bill my insurance or make self-payment for services provided; and responsibility for payment of non-covered services.     Patient would like the video invitation sent by:  My Chart    Mode of Communication:  Video Conference via Amwell    As the provider I attest to compliance with applicable laws and regulations related to telemedicine.    Visit Activities (Refresh list every visit): [facility] Only    Diagnostic Assessment Date: 9/27/22  Treatment Plan Review Date: 2/20/24  See Flowsheets for today's PHQ-9 and TIFFANY-7 results  Previous PHQ-9:       10/11/2023    11:51 AM 11/20/2023     9:51 AM 12/6/2023     4:54 PM   PHQ-9 SCORE   PHQ-9 Total Score MyChart 0 0 0   PHQ-9 Total Score 0 0 0     Previous TFIFANY-7:       9/27/2023     4:53 PM 12/4/2023     7:25 PM 12/6/2023     4:55 PM   TIFFANY-7 SCORE   Total Score 1 (minimal anxiety) 1 (minimal anxiety) 1 (minimal anxiety)   Total Score 1 1 1       KARINA  LEVEL:      2010     3:00 PM 2011    11:00 AM   KARINA Score (Last Two)   KARINA Raw Score 51 49   Activation Score 91.6 82.8   KARINA Level 4 4       DATA  Extended Session (60+ minutes): No  Interactive Complexity: No  Crisis: No  [facility] Patient: No    Treatment Objective(s) Addressed in This Session:  Target Behavior(s):  anxiety and depression    Depressed Mood: Increase interest, engagement, and pleasure in doing things  Decrease frequency and intensity of feeling down, depressed, hopeless  Improve quantity and quality of night time sleep / decrease daytime naps  Feel less tired and more energy during the day   Improve diet, appetite, mindful eating, and / or meal planning  Identify negative self-talk and behaviors: challenge core beliefs, myths, and actions  Improve concentration, focus, and mindfulness in daily activities   Feel less fidgety, restless or slow in daily activities / interpersonal interactions  Anxiety: will experience a reduction in anxiety, will develop more effective coping skills to manage anxiety symptoms, will develop healthy cognitive patterns and beliefs and will increase ability to function adaptively    Current Stressors / Issues:  Reports doing a lot of holiday planning. Reports having another death of a family friend by suicide. This has been difficult for her and family to process. Reports some increase in anxiety with everything going on. Reports having increase in headaches as a result. Has been religiously doing meditation. Feeling the anxiety is miss placed and will come up out of the blue. Discussed self care and watching the amount of things she is taking on for her responsibility.     23  She had a wonderful Thanksgiving. Went to the crowded [employer] this weekend with minimal anxiety. Reports an additional death in the family of a close cousin. Feeling more prepared for this . Has had some anticipation of anxiety due to last . Discussed preparation and  "acceptance for the event. Feeling more prepared this year with Dieudonne.     23  Reports she has been doing really well. States she is ready for Thanksgiving. Reports a more stressful week last week with family . Had some anxiety with the amount people being present. The large group and noise was a bit more difficult for her. Discussed anticipation with anxiety and how it effects her overall. Discussed recovery from high anxiety events.    23  Reports doing \"really well\", reports possibly the best she has been since working with [facility]. She is feeling overall very healthy. Discussed time spent with grand children. Reports she will be seeing her brother later today who has struggled with drinking for many years. Discussed how she emotionally manages this relationship and setting boundaries.     10/25/23  Reports headaches have improved a lot. 1.5 wks without headaches. Reports having very little anxiety when hosting card club with friends. Overall feeling anxiety and mental health has been really good over the past few days.     10/12/23  Reports she is doing well. Discussed appts with providers going well. Has continued to have headaches. Does not want to make med changes around the holiday. Discussed planning a party for friends at her house.     23  Anxiety has been pretty good for the past week. Managing minor stressors better. Continues to have headaches frequently. Sleep continues to be good. Had one day of depressed mood but passed. Experienced some anxiety of possibly depressed mood coming on. Processed feels around this. Discussed self care and taking time for self.     23  Reports she has been doing pretty well. Having more frequent anxiety but not to the point of not being able to keep using skills. Continues to have headaches. Reports with closing the cabin and end of the summer, causes anxiety to be higher. She has been entertaining more frequently as well. The holidays coming " has been somewhat more anxiety provoking. Discussed being mindful of self care and living presently.     9/7/23  Reports finishing up summer projects. Minimal anxiety as of late. Had a more difficult day yesterday with anxiety but it had passed. She was having some feelings of brain fog also. Sleep continues to be very good. Headaches are less frequent/severe but do persist. Discussed desire to be off meds completely as ultimate goal. Nothing she plans to rush into.       8/21/23  Reporting more good days than bad days. Having more anxiety days since cutting back on medications. She has not been altering her behaviors in life. Preparing for Labor day weekend with family. She is having minimal anxiety. Reinforced skills and continued practice. States she has been reading recommended books with other suggestions for tx of anxiety. She has been enjoying trying different tech as well.     8/16/23  Reports weaning off some of her anxiety medications in attempts to reduce headaches. Has been not been noticing much of a change in her anxiety. She has been working on increasing old activities she had been doing over a year ago prior experiencing high anxiety. Reports one panic attack related to making many decisions and a hectic situation. Attempted to do breathing tech to self sooth. Reports rapid heart rate lasted almost the rest of day despite use of multiple techs. Discussed nature of anxiety and educated on how panic attacks effect on overall system with in the body. Discussed being easy on self and avoiding self  of progress with anxiety.     8/2/23  Reporting spending time with grand children which is always refreshing to her. She has been volunteering for a summer school program. Reporting having a larger groups adults causes her anxiety/headaches. Discussed saying no as part of self care. She has been extending self more to engage in things she likes but has been noting more tiredness and anxiety.        Progress on Treatment Objective(s) / Homework:  Satisfactory progress - ACTION (Actively working towards change); Intervened by reinforcing change plan / affirming steps taken    Motivational Interviewing    MI Intervention: Expressed Empathy/Understanding, Supported Autonomy, Collaboration, Evocation, Open-ended questions, Change talk (evoked) and Reframe     Change Talk Expressed by the Patient: Desire to change Ability to change Reasons to change Need to change    Provider Response to Change Talk: E - Evoked more info from patient about behavior change, A - Affirmed patient's thoughts, decisions, or attempts at behavior change, R - Reflected patient's change talk and S - Summarized patient's change talk statements      Care Plan review completed: Yes    Medication Review:  No changes to current psychiatric medication(s)    Medication Compliance:  Yes    Changes in Health Issues:   None reported    Chemical Use Review:   Substance Use: Chemical use reviewed, no active concerns identified      Tobacco Use: No current tobacco use.      Assessment: Current Emotional / Mental Status (status of significant symptoms):  Risk status (Self / Other harm or suicidal ideation)  Patient denies a history of suicidal ideation, suicide attempts, self-injurious behavior, homicidal ideation, homicidal behavior and and other safety concerns  Patient denies current fears or concerns for personal safety.  Patient denies current or recent suicidal ideation or behaviors.  Patient denies current or recent homicidal ideation or behaviors.  Patient denies current or recent self injurious behavior or ideation.  Patient denies other safety concerns.  A safety and risk management plan has not been developed at this time, however patient was encouraged to call [facility] / [facility] should there be a change in any of these risk factors.    Appearance:   Appropriate   Eye Contact:   Good   Psychomotor Behavior: Normal    Attitude:   Cooperative   Orientation:   All  Speech   Rate / Production: Normal    Volume:  Normal   Mood:    Anxious   Affect:    Appropriate   Thought Content:  Clear   Thought Form:  Coherent  Logical   Insight:    Good     Diagnoses:  1. Generalized anxiety disorder    2. Mood disorder (H24)    3. Insomnia, unspecified type            Collateral Reports Completed:  Not Applicable    Plan: (Homework, other):  Patient was given information about behavioral services and encouraged to schedule a follow up appointment with the clinic [facility] in 1 week.  She was also given information about mental health symptoms and treatment options .  CD Recommendations: No indications of CD issues.   Erika Francisco MSW LICSW      ______________________________________________________________________    Integrated Primary Care Behavioral Health Treatment Plan    Patient's Name: Jolynn Haji  YOB: 1955    Date of Creation: 10/5/22  Date Treatment Plan Last Reviewed/Revised: 11/20/23    DSM5 Diagnoses: 300.02 (F41.1) Generalized Anxiety Disorder  Psychosocial / Contextual Factors: Individual Factors high anxiety associated with depressive sx, interfereing with ability to function as she would like.  .  PROMIS (reviewed every 90 days):     Referral / Collaboration:  Was/were discussed and patient will pursue. - individual therapy    Anticipated number of session for this episode of care: 5-8 sessions  Anticipation frequency of session: Weekly  Anticipated Duration of each session: 38-52 minutes  Treatment plan will be reviewed in 90 days or when goals have been changed.       MeasurableTreatment Goal(s) related to diagnosis / functional impairment(s)  Goal 1: Patient will work with provider to manage symptoms    I will know I've met my goal when when less anxious and feeling down.      Objective #A (Patient Action)    Patient will   attend all sessions .  Status: Continued - Date(s): 11/20/23    Intervention(s)  Therapist  will teach  educate patient on treatment options, clarify concerns, work with pt to overcome any resistance to compliance. .    Objective #B  Patient will identify 3 fears / thoughts that contribute to feeling anxious.  Status: Continued - Date(s): 11/20/23    Intervention(s)  Therapist will  educate patient on treatment options, clarify concerns, work with pt to overcome any resistance to compliance. .    Objective #C  Patient will identify 3 initial signs or symptoms of anxiety.  Status: Continued - Date(s): 11/20/23    Intervention(s)  Therapist will  educate patient on treatment options, clarify concerns, work with pt to overcome any resistance to compliance. .        Patient has reviewed and agreed to the above plan.      Erika Francisco, [facility]  December 20, 2023  Answers for HPI/ROS submitted by the patient on 12/6/2022  TIFFANY 7 TOTAL SCORE: 3  Answers for HPI/ROS submitted by the patient on 3/8/2023  If you checked off any problems, how difficult have these problems made it for you to do your work, take care of things at home, or get along with other people?: Not difficult at all  PHQ9 TOTAL SCORE: 5  Answers for HPI/ROS submitted by the patient on 4/26/2023  If you checked off any problems, how difficult have these problems made it for you to do your work, take care of things at home, or get along with other people?: Somewhat difficult  PHQ9 TOTAL SCORE: 1  Answers for HPI/ROS submitted by the patient on 6/12/2023  If you checked off any problems, how difficult have these problems made it for you to do your work, take care of things at home, or get along with other people?: Not difficult at all  PHQ9 TOTAL SCORE: 1  Answers for HPI/ROS submitted by the patient on 7/18/2023  If you checked off any problems, how difficult have these problems made it for you to do your work, take care of things at home, or get along with other people?: Not difficult at all  PHQ9 TOTAL SCORE: 1Answers submitted by the patient for this  visit:  Patient Health Questionnaire (Submitted on 8/27/2023)  If you checked off any problems, how difficult have these problems made it for you to do your work, take care of things at home, or get along with other people?: Not difficult at all  PHQ9 TOTAL SCORE: 1  Answers submitted by the patient for this visit:  Patient Health Questionnaire (Submitted on 10/11/2023)  PHQ9 TOTAL SCORE: 0    Answers submitted by the patient for this visit:  Patient Health Questionnaire (Submitted on 11/20/2023)  If you checked off any problems, how difficult have these problems made it for you to do your work, take care of things at home, or get along with other people?: Not difficult at all  PHQ9 TOTAL SCORE: 0

## 2023-12-20 ENCOUNTER — VIRTUAL VISIT (OUTPATIENT)
Dept: BEHAVIORAL HEALTH | Facility: CLINIC | Age: 68
End: 2023-12-20
Payer: COMMERCIAL

## 2023-12-20 DIAGNOSIS — F41.1 GENERALIZED ANXIETY DISORDER: Primary | ICD-10-CM

## 2023-12-20 DIAGNOSIS — F39 MOOD DISORDER (H): ICD-10-CM

## 2023-12-20 DIAGNOSIS — G47.00 INSOMNIA, UNSPECIFIED TYPE: ICD-10-CM

## 2023-12-20 PROCEDURE — 90837 PSYTX W PT 60 MINUTES: CPT | Mod: 95 | Performed by: SOCIAL WORKER

## 2023-12-22 ENCOUNTER — MYC MEDICAL ADVICE (OUTPATIENT)
Dept: PSYCHIATRY | Facility: CLINIC | Age: 68
End: 2023-12-22
Payer: COMMERCIAL

## 2023-12-22 DIAGNOSIS — F41.1 GAD (GENERALIZED ANXIETY DISORDER): ICD-10-CM

## 2023-12-22 RX ORDER — VENLAFAXINE HYDROCHLORIDE 75 MG/1
CAPSULE, EXTENDED RELEASE ORAL
Qty: 90 CAPSULE | Refills: 0 | Status: SHIPPED | OUTPATIENT
Start: 2023-12-22 | End: 2024-01-09

## 2023-12-22 NOTE — TELEPHONE ENCOUNTER
Reason for call:  Medication   If this is a refill request, has the caller requested the refill from the pharmacy already? Yes  Will the patient be using a [facility]? Yes  Name of the pharmacy and phone number for the current request: [facility] [phone]    Name of the medication requested: Venlofaxine 75mg    Other request: Pt stated the pharmacy has called her a couple times in regards to this medication. Pt has 4 doses left     Phone number to reach patient:  Cell number on file:    Telephone Information:   Mobile [phone]       Best Time:  any    Can we leave a detailed message on this number?  YES    Travel screening: Not Applicable

## 2023-12-22 NOTE — TELEPHONE ENCOUNTER
Date of Last Office Visit: 10/10/2023  Date of Next Office Visit: 1/9/2024  No shows since last visit: none  Cancellations since last visit: none    Medication requested: venlafaxine (EFFEXOR XR) 75 MG 24 hr capsule  Date last ordered: 7/11/2023 Qty: 90 Refills: 1     Review of [place] ?: No, this medication is not monitored by the [place]-     RN did call the pharmacy and the last of the refills from 7/11/2023 was sold on 9/25/2023 and there are no remaining refills on file.     Lapse in medication adherence greater than 5 days?: NO, the patient reports only having 4 tablets remaining  If yes, call patient and gather details: n/a  Medication refill request verified as identical to current order?: Yes  Result of Last DAM, VPA, Li+ Level, CBC, or Carbamazepine Level (at or since last visit):  NO new lab work completed since last appointment 10/10/203    Last visit treatment plan:   10/10/2023:  Patient overall doing okay.  Unfortunately headaches and fatigue have persisted even with decreased dose of venlafaxine.  We will increase propranolol and changed to extended release so it is once a day to see if that is a little bit more helpful for anxiety and headaches.  Patient controlled decreased dose of venlafaxine and we may consider changing to something different after the holidays.  We would like to prevent any decompensation during the holiday season.  No acute safety concerns.  No SI.  No problematic drug or alcohol use.  Patient continues in therapy.     Medication side effects and alternatives were reviewed. Health promotion activities recommended and reviewed today. All questions addressed. Education and counseling completed regarding risks and benefits of medications and psychotherapy options. Recommend therapy for additional support.      Treatment Plan:   Continue trazodone 25-50 mg at bedtime as needed for sleep  Continue Effexor-XR/venlafaxine .5 mg for mood and anxiety. OK to trial decreasing down to 75  mg before next visit.   Discontinue propranolol IMMEDIATE RELEASE   Start propranolol EXTENDED RELEASE 60 mg ONCE daily for anxiety, tremor, headaches.  Continue all other cares per primary care provider.   Continue all other medications as reviewed per electronic medical record today.   Safety plan reviewed. To the Emergency Department as needed or call after hours crisis line at [phone] or [phone]. [place] Crisis Text Line. Text [place] to 824281 or Suicide LifeLine Chat: suicidepreventionlifeline.org/chat  Continue individual psychotherapy for additional support and ongoing development of nonpharmacologic coping skills and strategies.  Schedule an appointment with me in 3 months or sooner as needed. Call [facility] at [phone] to schedule.  Follow up with primary care provider as planned or for acute medical concerns.  Call the psychiatric nurse line with medication questions or concerns at [phone].  MedServehart may be used to communicate with your provider, but this is not intended to be used for emergencies    []Medication refilled per  Medication Refill in Ambulatory Care  policy.  [x]Medication unable to be refilled by RN due to criteria not met as indicated below:    []Eligibility - not seen in the last year   []Supervision - no future appointment   []Compliance - no shows, cancellations or lapse in therapy   []Verification - order discrepancy   []Controlled medication   []Medication not included in policy   [x]90-day supply request   []Other    A 90-day refill is pended for provider review    Delaney Lo RN on 12/22/2023 at 10:58 AM

## 2024-01-02 NOTE — PROGRESS NOTES
[facility]  January 3, 2024      Behavioral Health Clinician Progress Note    Patient Name: Jolynn Haji           Service Type:  Individual      Service Location:   MyChart / Email (patient reached)     Session Start Time: 10:00 am   Session End Time: 10:46 am      Session Length: 38 - 52      Attendees: Patient     Service Modality:  Video Visit:      Provider verified identity through the following two step process.  Patient provided:  Patient is known previously to provider    Telemedicine Visit: The patient's condition can be safely assessed and treated via synchronous audio and visual telemedicine encounter.      Reason for Telemedicine Visit: Patient has requested telehealth visit    Originating Site (Patient Location): Patient's home    Distant Site (Provider Location): [facility]    Consent:  The patient/guardian has verbally consented to: the potential risks and benefits of telemedicine (video visit) versus in person care; bill my insurance or make self-payment for services provided; and responsibility for payment of non-covered services.     Patient would like the video invitation sent by:  My Chart    Mode of Communication:  Video Conference via [employer]    As the provider I attest to compliance with applicable laws and regulations related to telemedicine.    Visit Activities (Refresh list every visit): [facility] Only    Diagnostic Assessment Date: 9/27/22  Treatment Plan Review Date: 2/20/24  See Flowsheets for today's PHQ-9 and TIFFANY-7 results  Previous PHQ-9:       10/11/2023    11:51 AM 11/20/2023     9:51 AM 12/6/2023     4:54 PM   PHQ-9 SCORE   PHQ-9 Total Score MyChart 0 0 0   PHQ-9 Total Score 0 0 0     Previous TIFFANY-7:       9/27/2023     4:53 PM 12/4/2023     7:25 PM 12/6/2023     4:55 PM   TIFFANY-7 SCORE   Total Score 1 (minimal anxiety) 1 (minimal anxiety) 1 (minimal anxiety)   Total Score 1 1 1       KARINA  LEVEL:      8/27/2010     3:00 PM 2/22/2011    11:00 AM   KARINA Score (Last Two)   KARINA Raw Score 51 49   Activation Score 91.6 82.8   KARINA Level 4 4       DATA  Extended Session (60+ minutes): No  Interactive Complexity: No  Crisis: No  [facility] Patient: No    Treatment Objective(s) Addressed in This Session:  Target Behavior(s):  anxiety and depression    Depressed Mood: Increase interest, engagement, and pleasure in doing things  Decrease frequency and intensity of feeling down, depressed, hopeless  Improve quantity and quality of night time sleep / decrease daytime naps  Feel less tired and more energy during the day   Improve diet, appetite, mindful eating, and / or meal planning  Identify negative self-talk and behaviors: challenge core beliefs, myths, and actions  Improve concentration, focus, and mindfulness in daily activities   Feel less fidgety, restless or slow in daily activities / interpersonal interactions  Anxiety: will experience a reduction in anxiety, will develop more effective coping skills to manage anxiety symptoms, will develop healthy cognitive patterns and beliefs and will increase ability to function adaptively    Current Stressors / Issues:  Reports having a wonderful [place] and New Years with family. Reports continued headaches with not knowing the cause. Anxiety has been pretty good but has noted some increased heart beats. She does not know if the racing heart is always associated with anxiety. Will be talking with psychiatry about possible med change. Pt is fearful of worsening her mental health to improve the headaches. Discussed being open with psychiatry on desires and wants when it comes to medications, even if it means just asking for direction with changes. Pt has a hard time expressing self at times with providers, tends to be more agreeable in appts.     12/20/23  Reports doing a lot of holiday planning. Reports having another death of a family friend by suicide. This has been  "difficult for her and family to process. Reports some increase in anxiety with everything going on. Reports having increase in headaches as a result. Has been religiously doing meditation. Feeling the anxiety is miss placed and will come up out of the blue. Discussed self care and watching the amount of things she is taking on for her responsibility.     23  She had a wonderful Thanksgiving. Went to the crowded [place] [employer] this weekend with minimal anxiety. Reports an additional death in the family of a close cousin. Feeling more prepared for this . Has had some anticipation of anxiety due to last . Discussed preparation and acceptance for the event. Feeling more prepared this year with [place].     23  Reports she has been doing really well. States she is ready for Thanksgiving. Reports a more stressful week last week with family . Had some anxiety with the amount people being present. The large group and noise was a bit more difficult for her. Discussed anticipation with anxiety and how it effects her overall. Discussed recovery from high anxiety events.    23  Reports doing \"really well\", reports possibly the best she has been since working with [facility]. She is feeling overall very healthy. Discussed time spent with grand children. Reports she will be seeing her brother later today who has struggled with drinking for many years. Discussed how she emotionally manages this relationship and setting boundaries.     10/25/23  Reports headaches have improved a lot. 1.5 wks without headaches. Reports having very little anxiety when hosting card club with friends. Overall feeling anxiety and mental health has been really good over the past few days.     10/12/23  Reports she is doing well. Discussed appts with providers going well. Has continued to have headaches. Does not want to make med changes around the holiday. Discussed planning a party for friends at her house. "     9/26/23  Anxiety has been pretty good for the past week. Managing minor stressors better. Continues to have headaches frequently. Sleep continues to be good. Had one day of depressed mood but passed. Experienced some anxiety of possibly depressed mood coming on. Processed feels around this. Discussed self care and taking time for self.     9/18/23  Reports she has been doing pretty well. Having more frequent anxiety but not to the point of not being able to keep using skills. Continues to have headaches. Reports with closing the cabin and end of the summer, causes anxiety to be higher. She has been entertaining more frequently as well. The holidays coming has been somewhat more anxiety provoking. Discussed being mindful of self care and living presently.     9/7/23  Reports finishing up summer projects. Minimal anxiety as of late. Had a more difficult day yesterday with anxiety but it had passed. She was having some feelings of brain fog also. Sleep continues to be very good. Headaches are less frequent/severe but do persist. Discussed desire to be off meds completely as ultimate goal. Nothing she plans to rush into.       8/21/23  Reporting more good days than bad days. Having more anxiety days since cutting back on medications. She has not been altering her behaviors in life. Preparing for Labor day weekend with family. She is having minimal anxiety. Reinforced skills and continued practice. States she has been reading recommended books with other suggestions for tx of anxiety. She has been enjoying trying different tech as well.     8/16/23  Reports weaning off some of her anxiety medications in attempts to reduce headaches. Has been not been noticing much of a change in her anxiety. She has been working on increasing old activities she had been doing over a year ago prior experiencing high anxiety. Reports one panic attack related to making many decisions and a hectic situation. Attempted to do breathing  tech to self sooth. Reports rapid heart rate lasted almost the rest of day despite use of multiple techs. Discussed nature of anxiety and educated on how panic attacks effect on overall system with in the body. Discussed being easy on self and avoiding self  of progress with anxiety.     8/2/23  Reporting spending time with grand children which is always refreshing to her. She has been volunteering for a summer school program. Reporting having a larger groups adults causes her anxiety/headaches. Discussed saying no as part of self care. She has been extending self more to engage in things she likes but has been noting more tiredness and anxiety.       Progress on Treatment Objective(s) / Homework:  Satisfactory progress - ACTION (Actively working towards change); Intervened by reinforcing change plan / affirming steps taken    Motivational Interviewing    MI Intervention: Expressed Empathy/Understanding, Supported Autonomy, Collaboration, Evocation, Open-ended questions, Change talk (evoked) and Reframe     Change Talk Expressed by the Patient: Desire to change Ability to change Reasons to change Need to change    Provider Response to Change Talk: E - Evoked more info from patient about behavior change, A - Affirmed patient's thoughts, decisions, or attempts at behavior change, R - Reflected patient's change talk and S - Summarized patient's change talk statements      Care Plan review completed: Yes    Medication Review:  No changes to current psychiatric medication(s)    Medication Compliance:  Yes    Changes in Health Issues:   None reported    Chemical Use Review:   Substance Use: Chemical use reviewed, no active concerns identified      Tobacco Use: No current tobacco use.      Assessment: Current Emotional / Mental Status (status of significant symptoms):  Risk status (Self / Other harm or suicidal ideation)  Patient denies a history of suicidal ideation, suicide attempts, self-injurious behavior,  homicidal ideation, homicidal behavior and and other safety concerns  Patient denies current fears or concerns for personal safety.  Patient denies current or recent suicidal ideation or behaviors.  Patient denies current or recent homicidal ideation or behaviors.  Patient denies current or recent self injurious behavior or ideation.  Patient denies other safety concerns.  A safety and risk management plan has not been developed at this time, however patient was encouraged to call [place] [phone] should there be a change in any of these risk factors.    Appearance:   Appropriate   Eye Contact:   Good   Psychomotor Behavior: Normal   Attitude:   Cooperative   Orientation:   All  Speech   Rate / Production: Normal    Volume:  Normal   Mood:    Anxious   Affect:    Appropriate   Thought Content:  Clear   Thought Form:  Coherent  Logical   Insight:    Good     Diagnoses:  1. Generalized anxiety disorder    2. Insomnia, unspecified type    3. Mood disorder (H24)              Collateral Reports Completed:  Not Applicable    Plan: (Homework, other):  Patient was given information about behavioral services and encouraged to schedule a follow up appointment with the clinic [facility] in 1 week.  She was also given information about mental health symptoms and treatment options .  CD Recommendations: No indications of CD issues.   Erika Nolan [facility]      ______________________________________________________________________    Integrated Primary Care Behavioral Health Treatment Plan    Patient's Name: Jolynn Haji  YOB: 1955    Date of Creation: 10/5/22  Date Treatment Plan Last Reviewed/Revised: 11/20/23    DSM5 Diagnoses: 300.02 (F41.1) Generalized Anxiety Disorder  Psychosocial / Contextual Factors: Individual Factors high anxiety associated with depressive sx, interfereing with ability to function as she would like.  .  PROMIS (reviewed every 90 days):     Referral / Collaboration:  Was/were discussed and  patient will pursue. - individual therapy    Anticipated number of session for this episode of care: 5-8 sessions  Anticipation frequency of session: Weekly  Anticipated Duration of each session: 38-52 minutes  Treatment plan will be reviewed in 90 days or when goals have been changed.       MeasurableTreatment Goal(s) related to diagnosis / functional impairment(s)  Goal 1: Patient will work with provider to manage symptoms    I will know I've met my goal when when less anxious and feeling down.      Objective #A (Patient Action)    Patient will   attend all sessions .  Status: Continued - Date(s): 11/20/23    Intervention(s)  Therapist will teach  educate patient on treatment options, clarify concerns, work with pt to overcome any resistance to compliance. .    Objective #B  Patient will identify 3 fears / thoughts that contribute to feeling anxious.  Status: Continued - Date(s): 11/20/23    Intervention(s)  Therapist will  educate patient on treatment options, clarify concerns, work with pt to overcome any resistance to compliance. .    Objective #C  Patient will identify 3 initial signs or symptoms of anxiety.  Status: Continued - Date(s): 11/20/23    Intervention(s)  Therapist will  educate patient on treatment options, clarify concerns, work with pt to overcome any resistance to compliance. .        Patient has reviewed and agreed to the above plan.      Erika Francisco [facility]  January 3, 2024  Answers for HPI/ROS submitted by the patient on 12/6/2022  TIFFANY 7 TOTAL SCORE: 3  Answers for HPI/ROS submitted by the patient on 3/8/2023  If you checked off any problems, how difficult have these problems made it for you to do your work, take care of things at home, or get along with other people?: Not difficult at all  PHQ9 TOTAL SCORE: 5  Answers for HPI/ROS submitted by the patient on 4/26/2023  If you checked off any problems, how difficult have these problems made it for you to do your work, take care of things at  home, or get along with other people?: Somewhat difficult  PHQ9 TOTAL SCORE: 1  Answers for HPI/ROS submitted by the patient on 6/12/2023  If you checked off any problems, how difficult have these problems made it for you to do your work, take care of things at home, or get along with other people?: Not difficult at all  PHQ9 TOTAL SCORE: 1  Answers for HPI/ROS submitted by the patient on 7/18/2023  If you checked off any problems, how difficult have these problems made it for you to do your work, take care of things at home, or get along with other people?: Not difficult at all  PHQ9 TOTAL SCORE: 1Answers submitted by the patient for this visit:  Patient Health Questionnaire (Submitted on 8/27/2023)  If you checked off any problems, how difficult have these problems made it for you to do your work, take care of things at home, or get along with other people?: Not difficult at all  PHQ9 TOTAL SCORE: 1  Answers submitted by the patient for this visit:  Patient Health Questionnaire (Submitted on 10/11/2023)  PHQ9 TOTAL SCORE: 0    Answers submitted by the patient for this visit:  Patient Health Questionnaire (Submitted on 11/20/2023)  If you checked off any problems, how difficult have these problems made it for you to do your work, take care of things at home, or get along with other people?: Not difficult at all  PHQ9 TOTAL SCORE: 0

## 2024-01-03 ENCOUNTER — VIRTUAL VISIT (OUTPATIENT)
Dept: BEHAVIORAL HEALTH | Facility: CLINIC | Age: 69
End: 2024-01-03
Payer: COMMERCIAL

## 2024-01-03 DIAGNOSIS — G47.00 INSOMNIA, UNSPECIFIED TYPE: ICD-10-CM

## 2024-01-03 DIAGNOSIS — F41.1 GENERALIZED ANXIETY DISORDER: Primary | ICD-10-CM

## 2024-01-03 DIAGNOSIS — F39 MOOD DISORDER (H): ICD-10-CM

## 2024-01-03 PROCEDURE — 90834 PSYTX W PT 45 MINUTES: CPT | Mod: 95 | Performed by: SOCIAL WORKER

## 2024-01-09 ENCOUNTER — VIRTUAL VISIT (OUTPATIENT)
Dept: PSYCHIATRY | Facility: CLINIC | Age: 69
End: 2024-01-09
Payer: COMMERCIAL

## 2024-01-09 DIAGNOSIS — Z87.820 HISTORY OF TRAUMATIC BRAIN INJURY: ICD-10-CM

## 2024-01-09 DIAGNOSIS — G21.19 DRUG-INDUCED PARKINSONISM (H): ICD-10-CM

## 2024-01-09 DIAGNOSIS — G47.00 INSOMNIA, UNSPECIFIED TYPE: ICD-10-CM

## 2024-01-09 DIAGNOSIS — F41.1 GAD (GENERALIZED ANXIETY DISORDER): Primary | ICD-10-CM

## 2024-01-09 PROCEDURE — 99214 OFFICE O/P EST MOD 30 MIN: CPT | Mod: 95 | Performed by: PSYCHIATRY & NEUROLOGY

## 2024-01-09 RX ORDER — VENLAFAXINE HYDROCHLORIDE 75 MG/1
75 CAPSULE, EXTENDED RELEASE ORAL DAILY
Qty: 90 CAPSULE | Refills: 1 | Status: SHIPPED | OUTPATIENT
Start: 2024-01-09 | End: 2024-02-28

## 2024-01-09 NOTE — PROGRESS NOTES
"Telemedicine Visit: The patient's condition can be safely assessed and treated via synchronous audio and visual telemedicine encounter.      Reason for Telemedicine Visit: Patient has requested telehealth visit    Originating Site (Patient Location): Patient's home    Distant Location (provider location): Off-Site    Consent:  The patient/guardian has verbally consented to: the potential risks and benefits of telemedicine (video visit) versus in person care; bill my insurance or make self-payment for services provided; and responsibility for payment of non-covered services.     Mode of Communication:  Video Conference via [place]    As the provider I attest to compliance with applicable laws and regulations related to telemedicine.        Outpatient Psychiatric Progress Note    Name: Jolynn Haji   : 1955                    Primary Care Provider: Helga Ibarra MD   Therapist: Working with DINESH Whipple    PHQ-9 scores:      10/11/2023    11:51 AM 2023     9:51 AM 2023     4:54 PM   PHQ-9 SCORE   PHQ-9 Total Score MyChart 0 0 0   PHQ-9 Total Score 0 0 0       TIFFANY-7 scores:      2023     4:53 PM 2023     7:25 PM 2023     4:55 PM   TIFFANY-7 SCORE   Total Score 1 (minimal anxiety) 1 (minimal anxiety) 1 (minimal anxiety)   Total Score 1 1 1       Patient Identification:  Patient is a 68 year old,   White Choose not to answer female  who presents for return visit with me.  Patient is currently retired. Patient attended the phone/video session alone today. Patient prefers to be called: \" Azalea\".    Interim History:  I last saw Jolynn Haji for outpatient psychiatry return visit on 10/10/2023. During that appointment, we:  Continue trazodone 25-50 mg at bedtime as needed for sleep  Continue Effexor-XR/venlafaxine .5 mg for mood and anxiety. OK to trial decreasing down to 75 mg before next visit.   Discontinue propranolol IMMEDIATE RELEASE   Start propranolol " "EXTENDED RELEASE 60 mg ONCE daily for anxiety, tremor, headaches.  Continue all other cares per primary care provider.     1/9: Patient overall doing quite well.  Had a nice holiday season celebrating with family.  Overall tolerating medications well.  Feels even more calm on extended release propranolol.  Still having headaches.  Continues on 112.5 mg of venlafaxine and has not yet trialed 75 mg.  No acute safety concern.  No SI.  No problematic drug or alcohol use.    Past Psychiatric Med Trials:  Psych Meds at Intake:  lexapro 20 mg daily  abilify 5 mg daily      Past Psych Meds:  Amitriptyline  Quetiapine  Mirtazapine - stopped \"because I was sleeping through the night\" hallucinations     Psychiatric ROS:  Jolynn Haji reports mood has been: Stable  Anxiety has been: Stable, manageable  Sleep has been: Stable, trazodone working well  Seema sxs: None  Psychosis sxs: N/A  ADHD/ADD sxs: N/A  PTSD sxs: N/A  PHQ9 and GAD7 scores were reviewed today if completed.   Medication side effects: Denies propranolol ASEs;  headaches still from venlafaxine  Current stressors include: Symptoms and See HPI above  Coping mechanisms and supports include: Family, Hobbies and Friends, therapy    Current medications include:   Current Outpatient Medications   Medication Sig    clindamycin (CLEOCIN T) 1 % external lotion Apply topically 2 times daily    clindamycin (CLINDAGEL) 1 % external gel Apply topically daily    lactobacillus rhamnosus, GG, (CULTURELL) capsule Take 1 capsule by mouth daily Taking every other day    lisinopril (ZESTRIL) 2.5 MG tablet Take 1 tablet (2.5 mg) by mouth daily    propranolol ER (INDERAL LA) 60 MG 24 hr capsule Take 1 capsule (60 mg) by mouth daily    traZODone (DESYREL) 50 MG tablet Take 0.5-1 tablets (25-50 mg) by mouth nightly as needed for sleep    venlafaxine (EFFEXOR XR) 37.5 MG 24 hr capsule Take one cap by mouth WITH 75 mg cap for total daily dose 112.5 mg.    venlafaxine (EFFEXOR XR) 75 MG 24 hr " capsule Take one cap by mouth WITH 37.5 mg cap for total daily dose 112.5 mg.     Current Facility-Administered Medications   Medication    lidocaine 1 % 4 mL    lidocaine 1 % injection 3 mL    lidocaine 1 % injection 4 mL    triamcinolone (KENALOG-40) injection 40 mg       Past Medical/Surgical History:  Past Medical History:   Diagnosis Date    Abnormal glandular Papanicolaou smear of cervix- ASCUS in 2000 12/5/2003    ASC H- 2000    Basal cell carcinoma of leg, right 05/2016    Dr Rogers    Colon polyps 05/2018    tubular adenoam - due 5 yrs - 2 mm    Concussion without loss of consciousness 4/12/2017    Hidradenitis     left  groin    HSIL (high grade squamous intraepithelial lesion) on Pap smear of cervix 7/5/2000    Normal paps from 2001 to 2016/cc    Hypertension goal BP (blood pressure) < 140/90     OA (osteoarthritis) of knee     moderate    Postmenopausal since 7/2008     Shingles 7/23/2010    left     Squamous cell carcinoma of skin, unspecified     Synovial cyst of popliteal space       has a past medical history of Abnormal glandular Papanicolaou smear of cervix- ASCUS in 2000 (12/5/2003), Basal cell carcinoma of leg, right (05/2016), Colon polyps (05/2018), Concussion without loss of consciousness (4/12/2017), Hidradenitis, HSIL (high grade squamous intraepithelial lesion) on Pap smear of cervix (7/5/2000), Hypertension goal BP (blood pressure) < 140/90, OA (osteoarthritis) of knee, Postmenopausal (since 7/2008 ), Shingles (7/23/2010), Squamous cell carcinoma of skin, unspecified, and Synovial cyst of popliteal space.    She has no past medical history of Malignant melanoma (H) or Squamous cell carcinoma.    Social History:  Reviewed. No changes to social history except as noted above in HPI.    Vital Signs:   None. This is phone/video visit.     Labs:  Most recent laboratory results reviewed and the pertinent results include:   TSH   Date Value Ref Range Status   02/08/2023 1.65 0.30 - 4.20 uIU/mL  Final   04/25/2022 1.30 0.40 - 4.00 mU/L Final   01/13/2021 2.07 0.40 - 4.00 mU/L Final     Lab Results   Component Value Date    WBC 8.1 04/25/2022    WBC 6.4 01/13/2021     Lab Results   Component Value Date    RBC 4.58 04/25/2022    RBC 4.54 01/13/2021     Lab Results   Component Value Date    HGB 13.6 04/25/2022    HGB 13.3 01/13/2021     Lab Results   Component Value Date    HCT 41.1 04/25/2022    HCT 41.7 01/13/2021     No components found for: MCT  Lab Results   Component Value Date    MCV 90 04/25/2022    MCV 92 01/13/2021     Lab Results   Component Value Date    MCH 29.7 04/25/2022    MCH 29.3 01/13/2021     Lab Results   Component Value Date    MCHC 33.1 04/25/2022    MCHC 31.9 01/13/2021     Lab Results   Component Value Date    RDW 12.9 04/25/2022    RDW 13.1 01/13/2021     Lab Results   Component Value Date     04/25/2022     01/13/2021     Last Comprehensive Metabolic Panel:  Sodium   Date Value Ref Range Status   09/27/2023 140 135 - 145 mmol/L Final     Comment:     Reference intervals for this test were updated on 09/26/2023 to more accurately reflect our healthy population. There may be differences in the flagging of prior results with similar values performed with this method. Interpretation of those prior results can be made in the context of the updated reference intervals.    01/13/2021 140 133 - 144 mmol/L Final     Potassium   Date Value Ref Range Status   09/27/2023 4.6 3.4 - 5.3 mmol/L Final   04/25/2022 4.4 3.4 - 5.3 mmol/L Final   01/13/2021 4.1 3.4 - 5.3 mmol/L Final     Chloride   Date Value Ref Range Status   09/27/2023 104 98 - 107 mmol/L Final   04/25/2022 105 94 - 109 mmol/L Final   01/13/2021 108 94 - 109 mmol/L Final     Carbon Dioxide   Date Value Ref Range Status   01/13/2021 28 20 - 32 mmol/L Final     Carbon Dioxide (CO2)   Date Value Ref Range Status   09/27/2023 26 22 - 29 mmol/L Final   04/25/2022 26 20 - 32 mmol/L Final     Anion Gap   Date Value Ref Range  Status   09/27/2023 10 7 - 15 mmol/L Final   04/25/2022 6 3 - 14 mmol/L Final   01/13/2021 4 3 - 14 mmol/L Final     Glucose   Date Value Ref Range Status   09/27/2023 97 70 - 99 mg/dL Final   04/25/2022 104 (H) 70 - 99 mg/dL Final   01/13/2021 91 70 - 99 mg/dL Final     Comment:     Fasting specimen     Urea Nitrogen   Date Value Ref Range Status   09/27/2023 23.6 (H) 8.0 - 23.0 mg/dL Final   04/25/2022 23 7 - 30 mg/dL Final   01/13/2021 18 7 - 30 mg/dL Final     Creatinine   Date Value Ref Range Status   09/27/2023 0.78 0.51 - 0.95 mg/dL Final   01/13/2021 0.68 0.52 - 1.04 mg/dL Final     GFR Estimate   Date Value Ref Range Status   09/27/2023 82 >60 mL/min/1.73m2 Final   01/13/2021 >90 >60 mL/min/[1.73_m2] Final     Comment:     Non  GFR Calc  Starting 12/18/2018, serum creatinine based estimated GFR (eGFR) will be   calculated using the Chronic Kidney Disease Epidemiology Collaboration   (CKD-EPI) equation.       Calcium   Date Value Ref Range Status   09/27/2023 9.7 8.8 - 10.2 mg/dL Final   01/13/2021 8.8 8.5 - 10.1 mg/dL Final     Bilirubin Total   Date Value Ref Range Status   09/27/2023 0.2 <=1.2 mg/dL Final   01/13/2021 0.8 0.2 - 1.3 mg/dL Final     Alkaline Phosphatase   Date Value Ref Range Status   09/27/2023 83 35 - 104 U/L Final   01/13/2021 72 40 - 150 U/L Final     ALT   Date Value Ref Range Status   09/27/2023 15 0 - 50 U/L Final     Comment:     Reference intervals for this test were updated on 6/12/2023 to more accurately reflect our healthy population. There may be differences in the flagging of prior results with similar values performed with this method. Interpretation of those prior results can be made in the context of the updated reference intervals.     01/13/2021 17 0 - 50 U/L Final     AST   Date Value Ref Range Status   09/27/2023 19 0 - 45 U/L Final     Comment:     Reference intervals for this test were updated on 6/12/2023 to more accurately reflect our healthy  population. There may be differences in the flagging of prior results with similar values performed with this method. Interpretation of those prior results can be made in the context of the updated reference intervals.   01/13/2021 13 0 - 45 U/L Final     Review of Systems:  10 systems (general, cardiovascular, respiratory, eyes, ENT, endocrine, GI, , M/S, neurological) were reviewed. Most pertinent finding(s) is/are: Some chronic pains, see HPI above. The remaining systems are all unremarkable.    Mental Status Examination (limited as this is by phone/video):  Appearance: Awake, alert, appears stated age, no acute distress, well-groomed   Attitude:  cooperative, pleasant   Motor: No gross abnormalities noted today.  Not formally tested  Oriented to:  person, place, time, and situation  Attention Span and Concentration:  normal  Speech: Normal volume, normal rate, normal rhythm, coherent  Language: intact  Mood: good  Affect: Overall appropriate and mood congruent  Associations:  no loose associations  Thought Process:  logical, linear and goal oriented  Thought Content:  no evidence of suicidal ideation or homicidal ideation, no evidence of psychotic thought, no auditory hallucinations present and no visual hallucinations present  Recent and Remote Memory: Intact to interview.  Not formally assessed.   Fund of Knowledge: appropriate  Insight: Good  Judgment:  intact, adequate for safety  Impulse Control:  intact    Suicide Risk Assessment:  Today Jolynn Haji reports no suicidal ideation. Based on all available evidence including the factors cited above, Jolynn Haji does not appear to be at imminent risk for self-harm, does not meet criteria for a 72-hr hold, and therefore remains appropriate for ongoing outpatient level of care.  A thorough assessment of risk factors related to suicide and self-harm have been reviewed and are noted above. The patient convincingly denies suicidality on several occasions. Local  community safety resources reviewed for patient to use if needed. There was no deceit detected, and the patient presented in a manner that was believable.     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  R/O Underlying Parkinson's Disease (pt working with neurology)    Medical comorbidities include:   Patient Active Problem List    Diagnosis Date Noted    Essential hypertension with goal blood pressure less than 140/90 07/16/2001     Priority: High    Major depressive disorder, recurrent episode, moderate (H) 12/06/2023     Priority: Medium    Bilateral lower extremity edema- trace pitting edema distal LE to mid pretibial areas Left > right 09/27/2023     Priority: Medium    Tremor 09/27/2023     Priority: Medium    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 - lower pretibial area  07/12/2023     Priority: Medium    Drug-induced parkinsonism (H)- Abilify - generic = ARIPiprazole( 5mg) - RESOLVED OFF ABILIFY  01/02/2023     Priority: Medium    Mood disorder (H24) 01/02/2023     Priority: Medium    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 - much improved - resolved as of 5/5/2023 07/07/2022     Priority: Medium    Adjustment insomnia - resolved as of 5/5/2023 - off of ambien with psychiatry's okay  12/23/2021     Priority: Medium    Screening for cervical cancer      Priority: Medium     1999 NIL  2000 ASCUS-H.  >> Colpo: Neg  2001 NIL   2003 NIL pap, Neg HPV  [id number], 2016  NIL pap  7/25/19 NIL pap, Neg HPV    Criteria necessary to stop screening per ASCCP guidelines:  Older than 65 years  Three consecutive negative cytology results or two consecutive negative cotest results within the previous 10 years, with the most recent being performed within the last 5 years.   (Women with hx of MADDIE 2 or 3, or  adenocarcinoma in situ should continue screening for full 20 years, even if this goes past age 65).        Hyperlipidemia LDL goal <130 11/30/2018     Priority: Medium    Basal cell carcinoma      Priority: Medium    Colon polyps 05/01/2018     Priority: Medium    Primary localized osteoarthrosis of left lower leg 02/19/2018     Priority: Medium    Other secondary osteoarthritis of left knee 01/29/2018     Priority: Medium    Chronic constipation 09/14/2017     Priority: Medium    Raynaud's phenomenon without gangrene 09/14/2017     Priority: Medium    Insomnia, unspecified type 07/25/2017     Priority: Medium    TIFFANY (generalized anxiety disorder) 05/23/2017     Priority: Medium    Adjustment disorder with mixed anxiety and depressed mood- mild-moderate  at this time 05/11/2017     Priority: Medium    Family history of celiac disease 05/11/2017     Priority: Medium    Basal cell carcinoma of leg, right 05/01/2016     Priority: Medium    Sun-damaged skin 04/21/2016     Priority: Medium    Seasonal allergic rhinitis 04/21/2016     Priority: Medium    Dupuytren's disease of palm - right 4th digit flexor tendon 06/11/2015     Priority: Medium    History of migraine headaches- assoc. with nausea/vomiting - resolved around menopause - were  premenstrual  01/08/2014     Priority: Medium    Rosacea 05/01/2013     Priority: Medium    Numbness of feet- distal feet R>L  02/21/2011     Priority: Medium    NUMBNESS AND TINGLING OF FOOT - bilateral -mild  08/25/2010     Priority: Medium    Osteopenia 01/02/2009     Priority: Medium    Lipidosis 10/24/2006     Priority: Medium     Problem list name updated by automated process. Provider to review      Localized osteoarthritis of hand 11/01/2005     Priority: Medium     Problem list name updated by automated process. Provider to review      Stress incontinence, female - mild 10/23/2012     Priority: Low    Cough 10/23/2012     Priority: Low    Acute bronchitis- recurrent- ? related  to mold in school building- pt has since retired and no further bronchitis 10/23/2012     Priority: Low    Postmenopausal      Priority: Low    DERMATOPHYTOSIS OF NAIL- toenails  03/14/2006     Priority: Low     trichophyton rubrum on culture - 1/06       Synovial cyst of popliteal space      Priority: Low    Hidradenitis 12/05/2003     Priority: Low       Psychosocial & Contextual Factors: see HPI above    Assessment:  From Intake, 5/12/2022:  Jolynn Haji is a 66-year-old female with a history including anxiety, depression, insomnia status post concussion in April 2017.  No major mental health struggles prior to concussion in 2017.  Patient was started on Lexapro, quetiapine, mirtazapine to help with her symptoms.  Patient had been feeling much better on that medication combination and once she was sleeping well again quetiapine and mirtazapine were discontinued.  This past January the patient started to have some worsening symptoms and Abilify was added.  Patient has felt like her current medications of Lexapro and Abilify have been helpful but reports starting to feel really slowed and numb the past several weeks.  She also reported a weight loss of nearly 20 pounds over the last 6 weeks versus several months (patient was not very sure). I am wondering if she could be experiencing side effects of her increased Lexapro dose.  She is agreeable to decreasing her dose slightly to 15 mg daily.  Could also consider decreasing Abilify in the case it is not her Lexapro.  She also desires to sleep a little bit better than she has been.  Could consider restarting mirtazapine at bedtime for sleep and to also augment her Lexapro.  Could also be beneficial for patient to undergo cognitive screening to check in and see what her baseline is status post concussion.  No acute safety concerns.  No SI.  No problematic drug or alcohol use.    5/24/2022:  Patient with some slight improvements of possible medication related side  effects after decreasing Lexapro.  I am wondering if there might be more robust improvements with decreasing Abilify/aripiprazole.  Possible drug-induced Parkinsonism?  She does seem to have delayed/slowed speech, masked facies.  Her gait and other movements unable to be observed but she does report other struggles that may be consistent with such a presentation.  We are going to decrease her Abilify down to 2 mg daily and I will see her back in just a few weeks.  We will likely discontinue her Abilify at her next visit.  I will message her primary care provider to get some collateral regarding previous baseline functioning prior to starting Abilify.  History of TBI could be confounding presentation.  Could consider neurology consult.    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  Instructed to stop Abilify today.  We will monitor for improvement of symptoms.  If symptoms do not improve may need to refer to neurology.  If symptoms start to improve we will consider low-dose methylphenidate augmentation of Lexapro next visit to help with low energy, mood, focus and concentration, cognition, s/p TBI if still clinically indicated.  We would discuss risks and benefits with patient and her daughter at that time.  No other acute safety concerns or SI.  No problematic drug or alcohol use.  If patient starts to struggle with sleep would restart mirtazapine at bedtime.    7/28/2022:  Pt still with sxs consistent with drug-induced Parksinsonism.  Neurology consult will be placed so that patient might get scheduled sooner than later due to the typically long wait.  We will start Ambien in hopes we can get her sleeping much better.  Discussed risks and benefits of its use, particularly in the geriatric population.  Lexapro has not been as helpful as it helped for her anxiety.  We will taper this medication and start Effexor/venlafaxine in hopes that might be more effective for her symptoms.  No  acute safety concerns.  No SI.  No problematic drug or alcohol use.    8/11/2022:  Overall with continued improved symptoms.  Hopefully patient will continue to have improvement of her symptoms off of Abilify.  Patient will be seeing neurology soon.  Encouraged to keep appointment.  We will continue taper off of Lexapro and continue on monotherapy with venlafaxine.  We will also continue Ambien at this time for sleep since patient is sleeping much better on the medication with improved symptoms and tolerating well.  No acute safety concerns.  No problematic drug or alcohol use.    9/1/2022:  Ongoing anxiety, still intense at times.  Still not sleeping very well.  Could consider sleep evaluation, but patient quite overwhelmed right now with various referrals and doctors appointments.  Ambien had been working quite well but not keeping her sleep now.  Still falling asleep relatively well.  We will transition to Ambien CR to see if that is more helpful.  We will continue to consider sleep evaluation to rule out possibility of REM behavior sleep disorder.  We will also transition venlafaxine to bedtime to see if that helps with any possible nausea.  Working with neurology to monitor for Parkinson's disease.  I do recommend neuropsychological testing due to ongoing possible cognitive difficulties/struggles.  No acute safety concerns.  No SI.  No problematic drug or alcohol use.    10/3/2022:  Patient overall with some ongoing anxiety but slowly improving since it got worse late August.  Recommend continuing to optimize Effexor-XR, especially since patient tolerating well.  No longer having any nausea now that dose is taken at bedtime.  Patient feeling a little foggy and off balance with Ambien CR and so we will switch back to Ambien immediate release.  No falls.  Recommend increasing Effexor-XR first and once well tolerated switching back to Ambien immediate release.  May need to continue to work on alternative options  for sleep if does not do well back on Ambien immediate release.  No acute safety concerns.  No SI.  No problematic drug or alcohol use.  Encouraged to continue working with neurology.  Next appointment in November.    10/24/2022:  Patient overall with some improved anxiety on increased dose of Effexor-XR.  Could use additional improvement of anxiety and so we will continue to optimize therapy with Effexor-XR.  We will increase dose to 150 mg daily.  Tolerating well with no notable negative side effects.  Patient transitioned back to Ambien CR and is sleeping better on this compared to Ambien immediate release.  We will continue Ambien CR for sleep.  Discussed possibility of sleep medicine referral but patient declines at this time.  Could consider in the future.  No acute safety concerns.  No SI.  No problematic drug or alcohol use.  Encouraged to continue working with neurology.    12/09/2022:  Patient overall doing fairly well.  Feels like increased dose of venlafaxine has been helpful.  I do wonder if she is having some subtle withdrawal symptoms late in the afternoon with headache and fatigue.  Discussed shifting her dose to the morning.  However, she reports she will try to drink more water and see if that is helpful before trying to make changes to her medication.  She feels like things are going well enough that she really does not want to change anything.  Encouraged to continue in therapy.  No acute safety concerns.  No SI.  No problematic drug or alcohol use.    1/19/2023:  Patient overall doing well with ongoing improvements.  Patient even recently starting to drive again which is a big deal.  She continues in therapy and finds it helpful.  Discussed possible changes to her dosing to see if headaches might improve, along with fatigue.  Discussed possibly splitting her Effexor-XR dose as 75 mg twice daily versus 37 point 5 in the morning and 112.5 mg at bedtime.  Patient feels like she is in a good place  right now and really does not want to make any medication changes if possible.  We will keep things the same for now.  Encouraged to drink lots of water.  No acute safety concerns.  No SI.  No problematic drug or alcohol use.    3/9/2023:  Since last visit we decreased Effexor-XR just slightly.  Unfortunately her anxiety has worsened on decreased dose.  Headaches and fatigue did not improve at all.  Since anxiety worsened we will go back up to 150 mg daily.  Patient prefers this over a trial of further decreased dose.  We will start a trial with propranolol to see if this helps improve anxiety and headaches.  May also help with her tremor.  Discussed watching for feeling too lightheaded or dizzy.  Patient also on low-dose lisinopril.  Last vital signs blood pressure 132/78 and pulse 96 on 2/24/2023.  No acute safety concerns.  No SI.  No problematic drug or alcohol use.    3/23/2023:  Patient overall doing very well.  Propranolol has been quite helpful.  Still having some anxiety and headaches so we will continue to optimize therapy with propranolol some.  We will increase to 20 mg twice daily.  Neuropsychiatric testing reviewed.  Some mild deficits but nothing indicating a neurocognitive disorder at this time.  This was reassuring to the patient.  Patient will review results with neurologist at upcoming appointment in May.  No acute safety concerns.  No SI.  No problematic drug or alcohol use.    4/27/2023:  Patient overall with relative stability of mood and anxiety symptoms.  We will work to discontinue Ambien to see if we can manage on medication such as trazodone.  I still continue to worry about possibility of prodromal Parkinson's disease.  Patient reported her sister noted patient to be yelling and screaming in her sleep the other night.  This concerns me for possible REM sleep behavior disorder.  Could simply be related to antidepressant therapy-but given recent parkinsonism response to neuroleptic,  symptoms should continue to be monitored closely.  Could again consider sleep medicine referral at some point in the future if needed.  Patient also continues to have headaches.  If patient remains stable would like to trial decreased dose of Effexor-XR to see if this helps improve headaches.  No acute safety concerns.  No SI.  No problematic drug or alcohol use.    5/11/2023:  Patient overall doing well.  Was able to replace Ambien with trazodone and still sleeping well.  Still some headaches but greatly improved off Ambien.  Went back to taking propranolol 20 mg twice daily and finds this most effective for her anxiety, tremor, etc.  Discussed we could consider lowering venlafaxine ER in a couple of months if still doing really well and still having some headaches.  Patient has a neurology appointment tomorrow for check-in.  No acute safety concerns.  No SI.  No problematic drug or alcohol use.    7/11/2023:  Patient overall doing well still.  Mood has remained stable.  Anxiety overall manageable and stable as well.  Unfortunately continues to have headaches and feel fatigued.  Discussed I would like to move ahead with a trial of decreased venlafaxine.  Patient agreeable and will monitor mental health symptoms.  No acute safety concerns.  No SI.  No problematic drug or alcohol use.    10/10/2023:  Patient overall doing okay.  Unfortunately headaches and fatigue have persisted even with decreased dose of venlafaxine.  We will increase propranolol and changed to extended release so it is once a day to see if that is a little bit more helpful for anxiety and headaches.  Patient controlled decreased dose of venlafaxine and we may consider changing to something different after the holidays.  We would like to prevent any decompensation during the holiday season.  No acute safety concerns.  No SI.  No problematic drug or alcohol use.  Patient continues in therapy.    1/9/2024:  Patient overall doing relatively well.   Still getting headaches from venlafaxine so we will decrease the dose further to 75 mg daily to see if helpful without worsening mental health symptoms.  No acute safety concerns.  No SI.  No problematic drug or alcohol use.    Medication side effects and alternatives were reviewed. Health promotion activities recommended and reviewed today. All questions addressed. Education and counseling completed regarding risks and benefits of medications and psychotherapy options. Recommend therapy for additional support.     Treatment Plan:   Continue trazodone 25-50 mg at bedtime as needed for sleep  Decrease Effexor-XR/venlafaxine ER to 75 mg daily for anxiety to see if headaches improve. Please reach out if anxiety worsens significantly on decreased dose.    Continue propranolol EXTENDED RELEASE 60 mg ONCE daily for anxiety, tremor, headaches.  Continue all other cares per primary care provider.   Continue all other medications as reviewed per electronic medical record today.   Safety plan reviewed. To the Emergency Department as needed or call after hours crisis line at [phone] or [phone]. [place] Crisis Text Line. Text [place] to 871080 or Suicide LifeLine Chat: suicidepreventionlifeline.org/chat  Continue individual psychotherapy for additional support and ongoing development of nonpharmacologic coping skills and strategies.  Schedule an appointment with me in 6 weeks or sooner as needed. Call [facility] at [phone] to schedule.  Follow up with primary care provider as planned or for acute medical concerns.  Call the psychiatric nurse line with medication questions or concerns at [phone].  MyChart may be used to communicate with your provider, but this is not intended to be used for emergencies.    Administrative Billing:   Phone Call/Video Duration: 15 Minutes  Start: 11:33a  Stop: 11:48a    Patient Status:  Patient will continue to be seen for ongoing consultation and  stabilization.    Signed:   Judy Pompa DO  [facility]    Disclaimer: This note consists of symbols derived from keyboarding, dictation and/or voice recognition software. As a result, there may be errors in the script that have gone undetected. Please consider this when interpreting information found in this chart.

## 2024-01-09 NOTE — PATIENT INSTRUCTIONS
Treatment Plan:   Continue trazodone 25-50 mg at bedtime as needed for sleep  Decrease Effexor-XR/venlafaxine ER to 75 mg daily for anxiety to see if headaches improve. Please reach out if anxiety worsens significantly on decreased dose.    Continue propranolol EXTENDED RELEASE 60 mg ONCE daily for anxiety, tremor, headaches.  Continue all other cares per primary care provider.   Continue all other medications as reviewed per electronic medical record today.   Safety plan reviewed. To the Emergency Department as needed or call after hours crisis line at [phone] or [phone]. [place] Crisis Text Line. Text [place] to 980993 or Suicide LifeLine Chat: suicideprevention[place] Healthline.org/chat  Continue individual psychotherapy for additional support and ongoing development of nonpharmacologic coping skills and strategies.  Schedule an appointment with me in 6 weeks or sooner as needed. Call [facility] at [phone] to schedule.  Follow up with primary care provider as planned or for acute medical concerns.  Call the psychiatric nurse line with medication questions or concerns at [phone].  NERIhart may be used to communicate with your provider, but this is not intended to be used for emergencies.    Patient Education   Collaborative Care Psychiatry Service  What to Expect  Here's what to expect from your Collaborative Care Psychiatry Service (CCPS).   About CCPS  CCPS means 2 people work together to help you get better. You'll meet with a behavioral health clinician and a psychiatric doctor. A behavioral health clinician helps people with mental health problems by talking with them. A psychiatric doctor helps people by giving them medicine.  How it works  At every visit, you'll see the behavioral health clinician (BHC) first. They'll talk with you about how you're doing and teach you how to feel better.   Then you'll see the psychiatric doctor. This doctor can help you deal with troubling thoughts  "and feelings by giving you medicine. They'll make sure you know the plan for your care.   CCPS usually takes 3 to 6 visits. If you need more visits, we may have you start seeing a different psychiatric doctor for ongoing care.  If you have any questions or concerns, we'll be glad to talk with you.  About visits  Be open  At your visits, please talk openly about your problems. It may feel hard, but it's the best way for us to help you.  Cancelling visits  If you can't come to your visit, please call us right away at [phone]. If you don't cancel at least 24 hours (1 full day) before your visit, that's \"late cancellation.\"  Being late to visits  Being very late is the same as not showing up. You will be a \"no show\" if:  Your appointment starts with a [facility], and you're more than 15 minutes late for a 30-minute (half hour) visit. This will also cancel your appointment with the psychiatric doctor.  Your appointment is with a psychiatric doctor only, and you're more than 15 minutes late for a 30-minute (half hour) visit.  Your appointment is with a psychiatric doctor only, and you're more than 30 minutes late for a 60-minute (full hour) visit.  If you cancel late or don't show up 2 times within 6 months, we may end your care.   Getting help between visits  If you need help between visits, you can call us Monday to Friday from 8 a.m. to 4:30 p.m. at [phone].  Emergency care  Call 911 or go to the nearest emergency department if your life or someone else's life is in danger.  Call 988 anytime to reach the national Suicide and Crisis hotline.  Medicine refills  To refill your medicine, call your pharmacy. You can also call [facility] at [phone], Monday to Friday, 8 a.m. to 4:30 p.m. It can take 1 to 3 business days to get a refill.   Forms, letters, and tests  You may have papers to fill out, like FMLA, short-term disability, and workability. We can help you with these forms at " your visits, but you must have an appointment. You may need more than 1 visit for this, to be in an intensive therapy program, or both.  Before we can give you medicine for ADHD, we may refer you to get tested for it or confirm it another way.  We may not be able to give you an emotional support animal letter.  We don't do mental health checks ordered by the court.   We don't do mental health testing, but we can refer you to get tested.   Thank you for choosing us for your care.  For informational purposes only. Not to replace the advice of your health care provider. Copyright   2022 [employer]. All rights reserved. [employer] 670066 - 12/22.

## 2024-01-09 NOTE — NURSING NOTE
Is the patient currently in the state of [place]? YES    Visit mode:VIDEO    If the visit is dropped, the patient can be reconnected by: VIDEO VISIT: Text to cell phone:   Telephone Information:   Mobile [phone]       Will anyone else be joining the visit? NO  (If patient encounters technical issues they should call [phone] :[phone])    How would you like to obtain your AVS? MyChart    Are changes needed to the allergy or medication list? Pt stated no changes to allergies and Pt stated no med changes    Reason for visit: SHANNAN QUEEN

## 2024-01-09 NOTE — PROGRESS NOTES
"Virtual Visit Details    Type of service:  Video Visit   Video Start Time: {video visit start/end time for provider to select:018866}  Video End Time:{video visit start/end time for provider to select:903895}    Originating Location (pt. Location): {video visit patient location:680576::\"Home\"}  {PROVIDER LOCATION On-site should be selected for visits conducted from your clinic location or adjoining [facility] hospital, academic office, or other nearby [facility] building. Off-site should be selected for all other provider locations, including home:803374}  Distant Location (provider location):  {virtual location provider:367683}  Platform used for Video Visit: {Virtual Visit Platforms:539632::\"[employer]\"}  "

## 2024-01-10 ENCOUNTER — HOSPITAL ENCOUNTER (OUTPATIENT)
Dept: MAMMOGRAPHY | Facility: CLINIC | Age: 69
Discharge: HOME | End: 2024-01-10
Attending: FAMILY MEDICINE | Admitting: FAMILY MEDICINE
Payer: COMMERCIAL

## 2024-01-10 DIAGNOSIS — Z12.31 VISIT FOR SCREENING MAMMOGRAM: ICD-10-CM

## 2024-01-10 PROCEDURE — 77063 BREAST TOMOSYNTHESIS BI: CPT

## 2024-01-16 NOTE — PROGRESS NOTES
[facility]  January 18, 2024      Behavioral Health Clinician Progress Note    Patient Name: Jolynn Haji           Service Type:  Individual      Service Location:   MyChart / Email (patient reached)     Session Start Time: 10:00 am   Session End Time: 10:49 am      Session Length: 38 - 52      Attendees: Patient     Service Modality:  Video Visit:      Provider verified identity through the following two step process.  Patient provided:  Patient is known previously to provider    Telemedicine Visit: The patient's condition can be safely assessed and treated via synchronous audio and visual telemedicine encounter.      Reason for Telemedicine Visit: Patient has requested telehealth visit    Originating Site (Patient Location): Patient's home    Distant Site (Provider Location): [facility]    Consent:  The patient/guardian has verbally consented to: the potential risks and benefits of telemedicine (video visit) versus in person care; bill my insurance or make self-payment for services provided; and responsibility for payment of non-covered services.     Patient would like the video invitation sent by:  My Chart    Mode of Communication:  Video Conference via [facility]    As the provider I attest to compliance with applicable laws and regulations related to telemedicine.    Visit Activities (Refresh list every visit): [facility] Only    Diagnostic Assessment Date: 9/27/22  Treatment Plan Review Date: 2/20/24  See Flowsheets for today's PHQ-9 and TIFFANY-7 results  Previous PHQ-9:       10/11/2023    11:51 AM 11/20/2023     9:51 AM 12/6/2023     4:54 PM   PHQ-9 SCORE   PHQ-9 Total Score MyChart 0 0 0   PHQ-9 Total Score 0 0 0     Previous TIFFANY-7:       9/27/2023     4:53 PM 12/4/2023     7:25 PM 12/6/2023     4:55 PM   TIFFANY-7 SCORE   Total Score 1 (minimal anxiety) 1 (minimal anxiety) 1 (minimal anxiety)   Total Score 1 1 1       KARINA  LEVEL:      8/27/2010     3:00 PM 2/22/2011    11:00 AM   KARINA Score (Last Two)   KARINA Raw Score 51 49   Activation Score 91.6 82.8   KARINA Level 4 4       DATA  Extended Session (60+ minutes): No  Interactive Complexity: No  Crisis: No  [facility] Patient: No    Treatment Objective(s) Addressed in This Session:  Target Behavior(s):  anxiety and depression    Depressed Mood: Increase interest, engagement, and pleasure in doing things  Decrease frequency and intensity of feeling down, depressed, hopeless  Improve quantity and quality of night time sleep / decrease daytime naps  Feel less tired and more energy during the day   Improve diet, appetite, mindful eating, and / or meal planning  Identify negative self-talk and behaviors: challenge core beliefs, myths, and actions  Improve concentration, focus, and mindfulness in daily activities   Feel less fidgety, restless or slow in daily activities / interpersonal interactions  Anxiety: will experience a reduction in anxiety, will develop more effective coping skills to manage anxiety symptoms, will develop healthy cognitive patterns and beliefs and will increase ability to function adaptively    Current Stressors / Issues:  Reports decrease in headaches with a decrease in medications. Anxiety appears to be stable. Reports  is having MRI to check lesions on pancreas. Some anxiety around this happening. Discussed taking things as they come and life in the day. Discussed desire to take vacation this winter but not planned at this time.      1/3/24  Reports having a wonderful [place] and New Years with family. Reports continued headaches with not knowing the cause. Anxiety has been pretty good but has noted some increased heart beats. She does not know if the racing heart is always associated with anxiety. Will be talking with psychiatry about possible med change. Pt is fearful of worsening her mental health to improve the headaches. Discussed being open with psychiatry on  "desires and wants when it comes to medications, even if it means just asking for direction with changes. Pt has a hard time expressing self at times with providers, tends to be more agreeable in appts.     23  Reports doing a lot of holiday planning. Reports having another death of a family friend by suicide. This has been difficult for her and family to process. Reports some increase in anxiety with everything going on. Reports having increase in headaches as a result. Has been religiously doing meditation. Feeling the anxiety is miss placed and will come up out of the blue. Discussed self care and watching the amount of things she is taking on for her responsibility.     23  She had a wonderful Thanksgiving. Went to the crowded [employer] this weekend with minimal anxiety. Reports an additional death in the family of a close cousin. Feeling more prepared for this . Has had some anticipation of anxiety due to last . Discussed preparation and acceptance for the event. Feeling more prepared this year with [place].     23  Reports she has been doing really well. States she is ready for Thanksgiving. Reports a more stressful week last week with family . Had some anxiety with the amount people being present. The large group and noise was a bit more difficult for her. Discussed anticipation with anxiety and how it effects her overall. Discussed recovery from high anxiety events.    23  Reports doing \"really well\", reports possibly the best she has been since working with [facility] [place]. She is feeling overall very healthy. Discussed time spent with grand children. Reports she will be seeing her brother later today who has struggled with drinking for many years. Discussed how she emotionally manages this relationship and setting boundaries.     10/25/23  Reports headaches have improved a lot. 1.5 wks without headaches. Reports having very little anxiety when hosting card club with " friends. Overall feeling anxiety and mental health has been really good over the past few days.     10/12/23  Reports she is doing well. Discussed appts with providers going well. Has continued to have headaches. Does not want to make med changes around the holiday. Discussed planning a party for friends at her house.     9/26/23  Anxiety has been pretty good for the past week. Managing minor stressors better. Continues to have headaches frequently. Sleep continues to be good. Had one day of depressed mood but passed. Experienced some anxiety of possibly depressed mood coming on. Processed feels around this. Discussed self care and taking time for self.     9/18/23  Reports she has been doing pretty well. Having more frequent anxiety but not to the point of not being able to keep using skills. Continues to have headaches. Reports with closing the cabin and end of the summer, causes anxiety to be higher. She has been entertaining more frequently as well. The holidays coming has been somewhat more anxiety provoking. Discussed being mindful of self care and living presently.     9/7/23  Reports finishing up summer projects. Minimal anxiety as of late. Had a more difficult day yesterday with anxiety but it had passed. She was having some feelings of brain fog also. Sleep continues to be very good. Headaches are less frequent/severe but do persist. Discussed desire to be off meds completely as ultimate goal. Nothing she plans to rush into.       8/21/23  Reporting more good days than bad days. Having more anxiety days since cutting back on medications. She has not been altering her behaviors in life. Preparing for Labor day weekend with family. She is having minimal anxiety. Reinforced skills and continued practice. States she has been reading recommended books with other suggestions for tx of anxiety. She has been enjoying trying different tech as well.     8/16/23  Reports weaning off some of her anxiety medications in  attempts to reduce headaches. Has been not been noticing much of a change in her anxiety. She has been working on increasing old activities she had been doing over a year ago prior experiencing high anxiety. Reports one panic attack related to making many decisions and a hectic situation. Attempted to do breathing tech to self sooth. Reports rapid heart rate lasted almost the rest of day despite use of multiple techs. Discussed nature of anxiety and educated on how panic attacks effect on overall system with in the body. Discussed being easy on self and avoiding self  of progress with anxiety.     8/2/23  Reporting spending time with grand children which is always refreshing to her. She has been volunteering for a summer school program. Reporting having a larger groups adults causes her anxiety/headaches. Discussed saying no as part of self care. She has been extending self more to engage in things she likes but has been noting more tiredness and anxiety.       Progress on Treatment Objective(s) / Homework:  Satisfactory progress - ACTION (Actively working towards change); Intervened by reinforcing change plan / affirming steps taken    Motivational Interviewing    MI Intervention: Expressed Empathy/Understanding, Supported Autonomy, Collaboration, Evocation, Open-ended questions, Change talk (evoked) and Reframe     Change Talk Expressed by the Patient: Desire to change Ability to change Reasons to change Need to change    Provider Response to Change Talk: E - Evoked more info from patient about behavior change, A - Affirmed patient's thoughts, decisions, or attempts at behavior change, R - Reflected patient's change talk and S - Summarized patient's change talk statements      Care Plan review completed: Yes    Medication Review:  No changes to current psychiatric medication(s)    Medication Compliance:  Yes    Changes in Health Issues:   None reported    Chemical Use Review:   Substance Use: Chemical use  reviewed, no active concerns identified      Tobacco Use: No current tobacco use.      Assessment: Current Emotional / Mental Status (status of significant symptoms):  Risk status (Self / Other harm or suicidal ideation)  Patient denies a history of suicidal ideation, suicide attempts, self-injurious behavior, homicidal ideation, homicidal behavior and and other safety concerns  Patient denies current fears or concerns for personal safety.  Patient denies current or recent suicidal ideation or behaviors.  Patient denies current or recent homicidal ideation or behaviors.  Patient denies current or recent self injurious behavior or ideation.  Patient denies other safety concerns.  A safety and risk management plan has not been developed at this time, however patient was encouraged to call [place] [phone] should there be a change in any of these risk factors.    Appearance:   Appropriate   Eye Contact:   Good   Psychomotor Behavior: Normal   Attitude:   Cooperative   Orientation:   All  Speech   Rate / Production: Normal    Volume:  Normal   Mood:    Anxious   Affect:    Appropriate   Thought Content:  Clear   Thought Form:  Coherent  Logical   Insight:    Good     Diagnoses:  1. Generalized anxiety disorder    2. Insomnia, unspecified type              Collateral Reports Completed:  Not Applicable    Plan: (Homework, other):  Patient was given information about behavioral services and encouraged to schedule a follow up appointment with the clinic [facility] in 1 week.  She was also given information about mental health symptoms and treatment options .  CD Recommendations: No indications of CD issues.   Erika Francisco [facility] [place] LICSW      ______________________________________________________________________    Integrated Primary Care Behavioral Health Treatment Plan    Patient's Name: Jolynn Haji  YOB: 1955    Date of Creation: 10/5/22  Date Treatment Plan Last Reviewed/Revised: 11/20/23    DSM5 Diagnoses: 300.02  (F41.1) Generalized Anxiety Disorder  Psychosocial / Contextual Factors: Individual Factors high anxiety associated with depressive sx, interfereing with ability to function as she would like.  .  PROMIS (reviewed every 90 days):     Referral / Collaboration:  Was/were discussed and patient will pursue. - individual therapy    Anticipated number of session for this episode of care: 5-8 sessions  Anticipation frequency of session: Weekly  Anticipated Duration of each session: 38-52 minutes  Treatment plan will be reviewed in 90 days or when goals have been changed.       MeasurableTreatment Goal(s) related to diagnosis / functional impairment(s)  Goal 1: Patient will work with provider to manage symptoms    I will know I've met my goal when when less anxious and feeling down.      Objective #A (Patient Action)    Patient will   attend all sessions .  Status: Continued - Date(s): 11/20/23    Intervention(s)  Therapist will teach  educate patient on treatment options, clarify concerns, work with pt to overcome any resistance to compliance. .    Objective #B  Patient will identify 3 fears / thoughts that contribute to feeling anxious.  Status: Continued - Date(s): 11/20/23    Intervention(s)  Therapist will  educate patient on treatment options, clarify concerns, work with pt to overcome any resistance to compliance. .    Objective #C  Patient will identify 3 initial signs or symptoms of anxiety.  Status: Continued - Date(s): 11/20/23    Intervention(s)  Therapist will  educate patient on treatment options, clarify concerns, work with pt to overcome any resistance to compliance. .        Patient has reviewed and agreed to the above plan.      DINESH Whipple  January 18, 2024  Answers for HPI/ROS submitted by the patient on 12/6/2022  TIFFANY 7 TOTAL SCORE: 3  Answers for HPI/ROS submitted by the patient on 3/8/2023  If you checked off any problems, how difficult have these problems made it for you to do your work, take  care of things at home, or get along with other people?: Not difficult at all  PHQ9 TOTAL SCORE: 5  Answers for HPI/ROS submitted by the patient on 4/26/2023  If you checked off any problems, how difficult have these problems made it for you to do your work, take care of things at home, or get along with other people?: Somewhat difficult  PHQ9 TOTAL SCORE: 1  Answers for HPI/ROS submitted by the patient on 6/12/2023  If you checked off any problems, how difficult have these problems made it for you to do your work, take care of things at home, or get along with other people?: Not difficult at all  PHQ9 TOTAL SCORE: 1  Answers for HPI/ROS submitted by the patient on 7/18/2023  If you checked off any problems, how difficult have these problems made it for you to do your work, take care of things at home, or get along with other people?: Not difficult at all  PHQ9 TOTAL SCORE: 1Answers submitted by the patient for this visit:  Patient Health Questionnaire (Submitted on 8/27/2023)  If you checked off any problems, how difficult have these problems made it for you to do your work, take care of things at home, or get along with other people?: Not difficult at all  PHQ9 TOTAL SCORE: 1  Answers submitted by the patient for this visit:  Patient Health Questionnaire (Submitted on 10/11/2023)  PHQ9 TOTAL SCORE: 0    Answers submitted by the patient for this visit:  Patient Health Questionnaire (Submitted on 11/20/2023)  If you checked off any problems, how difficult have these problems made it for you to do your work, take care of things at home, or get along with other people?: Not difficult at all  PHQ9 TOTAL SCORE: 0    Answers submitted by the patient for this visit:  Patient Health Questionnaire (Submitted on 1/17/2024)  If you checked off any problems, how difficult have these problems made it for you to do your work, take care of things at home, or get along with other people?: Not difficult at all  PHQ9 TOTAL SCORE:  1

## 2024-01-18 ENCOUNTER — VIRTUAL VISIT (OUTPATIENT)
Dept: BEHAVIORAL HEALTH | Facility: CLINIC | Age: 69
End: 2024-01-18
Payer: COMMERCIAL

## 2024-01-18 DIAGNOSIS — F41.1 GENERALIZED ANXIETY DISORDER: Primary | ICD-10-CM

## 2024-01-18 DIAGNOSIS — G47.00 INSOMNIA, UNSPECIFIED TYPE: ICD-10-CM

## 2024-01-18 PROCEDURE — 90834 PSYTX W PT 45 MINUTES: CPT | Mod: 95 | Performed by: SOCIAL WORKER

## 2024-02-04 SDOH — HEALTH STABILITY: PHYSICAL HEALTH: ON AVERAGE, HOW MANY DAYS PER WEEK DO YOU ENGAGE IN MODERATE TO STRENUOUS EXERCISE (LIKE A BRISK WALK)?: 4 DAYS

## 2024-02-04 SDOH — HEALTH STABILITY: PHYSICAL HEALTH: ON AVERAGE, HOW MANY MINUTES DO YOU ENGAGE IN EXERCISE AT THIS LEVEL?: 40 MIN

## 2024-02-04 ASSESSMENT — PATIENT HEALTH QUESTIONNAIRE - PHQ9
SUM OF ALL RESPONSES TO PHQ QUESTIONS 1-9: 0
SUM OF ALL RESPONSES TO PHQ QUESTIONS 1-9: 0

## 2024-02-04 ASSESSMENT — ANXIETY QUESTIONNAIRES
5. BEING SO RESTLESS THAT IT IS HARD TO SIT STILL: NOT AT ALL
6. BECOMING EASILY ANNOYED OR IRRITABLE: NOT AT ALL
GAD7 TOTAL SCORE: 2
2. NOT BEING ABLE TO STOP OR CONTROL WORRYING: NOT AT ALL
7. FEELING AFRAID AS IF SOMETHING AWFUL MIGHT HAPPEN: NOT AT ALL
1. FEELING NERVOUS, ANXIOUS, OR ON EDGE: MORE THAN HALF THE DAYS
GAD7 TOTAL SCORE: 2
4. TROUBLE RELAXING: NOT AT ALL
8. IF YOU CHECKED OFF ANY PROBLEMS, HOW DIFFICULT HAVE THESE MADE IT FOR YOU TO DO YOUR WORK, TAKE CARE OF THINGS AT HOME, OR GET ALONG WITH OTHER PEOPLE?: NOT DIFFICULT AT ALL
3. WORRYING TOO MUCH ABOUT DIFFERENT THINGS: NOT AT ALL
7. FEELING AFRAID AS IF SOMETHING AWFUL MIGHT HAPPEN: NOT AT ALL
GAD7 TOTAL SCORE: 2
IF YOU CHECKED OFF ANY PROBLEMS ON THIS QUESTIONNAIRE, HOW DIFFICULT HAVE THESE PROBLEMS MADE IT FOR YOU TO DO YOUR WORK, TAKE CARE OF THINGS AT HOME, OR GET ALONG WITH OTHER PEOPLE: NOT DIFFICULT AT ALL

## 2024-02-04 ASSESSMENT — SOCIAL DETERMINANTS OF HEALTH (SDOH): HOW OFTEN DO YOU GET TOGETHER WITH FRIENDS OR RELATIVES?: THREE TIMES A WEEK

## 2024-02-05 ENCOUNTER — OFFICE VISIT (OUTPATIENT)
Dept: FAMILY MEDICINE | Facility: CLINIC | Age: 69
End: 2024-02-05
Payer: COMMERCIAL

## 2024-02-05 VITALS
HEIGHT: 68 IN | DIASTOLIC BLOOD PRESSURE: 86 MMHG | RESPIRATION RATE: 16 BRPM | HEART RATE: 72 BPM | SYSTOLIC BLOOD PRESSURE: 136 MMHG | WEIGHT: 149 LBS | TEMPERATURE: 97.1 F | BODY MASS INDEX: 22.58 KG/M2 | OXYGEN SATURATION: 100 %

## 2024-02-05 DIAGNOSIS — G21.19 DRUG-INDUCED PARKINSONISM (H): ICD-10-CM

## 2024-02-05 DIAGNOSIS — F33.0 MAJOR DEPRESSIVE DISORDER, RECURRENT, MILD (H): ICD-10-CM

## 2024-02-05 DIAGNOSIS — E78.5 HYPERLIPIDEMIA LDL GOAL <130: ICD-10-CM

## 2024-02-05 DIAGNOSIS — M85.80 OSTEOPENIA, UNSPECIFIED LOCATION: ICD-10-CM

## 2024-02-05 DIAGNOSIS — F41.1 GAD (GENERALIZED ANXIETY DISORDER): ICD-10-CM

## 2024-02-05 DIAGNOSIS — Z00.00 ENCOUNTER FOR MEDICARE ANNUAL WELLNESS EXAM: Primary | ICD-10-CM

## 2024-02-05 DIAGNOSIS — F43.23 ADJUSTMENT DISORDER WITH MIXED ANXIETY AND DEPRESSED MOOD: ICD-10-CM

## 2024-02-05 DIAGNOSIS — R25.1 TREMOR: ICD-10-CM

## 2024-02-05 DIAGNOSIS — I10 ESSENTIAL HYPERTENSION WITH GOAL BLOOD PRESSURE LESS THAN 140/90: ICD-10-CM

## 2024-02-05 DIAGNOSIS — F51.02 ADJUSTMENT INSOMNIA: ICD-10-CM

## 2024-02-05 DIAGNOSIS — R00.2 PALPITATIONS: ICD-10-CM

## 2024-02-05 DIAGNOSIS — R60.0 BILATERAL LOWER EXTREMITY EDEMA: ICD-10-CM

## 2024-02-05 DIAGNOSIS — G47.00 INSOMNIA, UNSPECIFIED TYPE: ICD-10-CM

## 2024-02-05 PROBLEM — F33.1 MAJOR DEPRESSIVE DISORDER, RECURRENT EPISODE, MODERATE (H): Status: ACTIVE | Noted: 2024-02-05

## 2024-02-05 PROBLEM — F33.1 MAJOR DEPRESSIVE DISORDER, RECURRENT EPISODE, MODERATE (H): Status: RESOLVED | Noted: 2024-02-05 | Resolved: 2024-02-05

## 2024-02-05 LAB
ALBUMIN SERPL BCG-MCNC: 4.4 G/DL (ref 3.5–5.2)
ALP SERPL-CCNC: 75 U/L (ref 40–150)
ALT SERPL W P-5'-P-CCNC: 16 U/L (ref 0–50)
ANION GAP SERPL CALCULATED.3IONS-SCNC: 9 MMOL/L (ref 7–15)
AST SERPL W P-5'-P-CCNC: 19 U/L (ref 0–45)
BILIRUB SERPL-MCNC: 0.7 MG/DL
BUN SERPL-MCNC: 17.4 MG/DL (ref 8–23)
CALCIUM SERPL-MCNC: 9.9 MG/DL (ref 8.8–10.2)
CHLORIDE SERPL-SCNC: 102 MMOL/L (ref 98–107)
CHOLEST SERPL-MCNC: 203 MG/DL
CREAT SERPL-MCNC: 0.68 MG/DL (ref 0.51–0.95)
CREAT UR-MCNC: 52.4 MG/DL
DEPRECATED HCO3 PLAS-SCNC: 27 MMOL/L (ref 22–29)
EGFRCR SERPLBLD CKD-EPI 2021: >90 ML/MIN/1.73M2
ERYTHROCYTE [DISTWIDTH] IN BLOOD BY AUTOMATED COUNT: 13.1 % (ref 10–15)
FASTING STATUS PATIENT QL REPORTED: YES
GLUCOSE SERPL-MCNC: 92 MG/DL (ref 70–99)
HCT VFR BLD AUTO: 40.8 % (ref 35–47)
HDLC SERPL-MCNC: 74 MG/DL
HGB BLD-MCNC: 13.1 G/DL (ref 11.7–15.7)
LDLC SERPL CALC-MCNC: 116 MG/DL
MCH RBC QN AUTO: 29.5 PG (ref 26.5–33)
MCHC RBC AUTO-ENTMCNC: 32.1 G/DL (ref 31.5–36.5)
MCV RBC AUTO: 92 FL (ref 78–100)
MICROALBUMIN UR-MCNC: <12 MG/L
MICROALBUMIN/CREAT UR: NORMAL MG/G{CREAT}
NONHDLC SERPL-MCNC: 129 MG/DL
PLATELET # BLD AUTO: 233 10E3/UL (ref 150–450)
POTASSIUM SERPL-SCNC: 4.7 MMOL/L (ref 3.4–5.3)
PROT SERPL-MCNC: 7.2 G/DL (ref 6.4–8.3)
RBC # BLD AUTO: 4.44 10E6/UL (ref 3.8–5.2)
SODIUM SERPL-SCNC: 138 MMOL/L (ref 135–145)
TRIGL SERPL-MCNC: 67 MG/DL
TSH SERPL DL<=0.005 MIU/L-ACNC: 1.26 UIU/ML (ref 0.3–4.2)
WBC # BLD AUTO: 8.7 10E3/UL (ref 4–11)

## 2024-02-05 PROCEDURE — 82570 ASSAY OF URINE CREATININE: CPT | Performed by: FAMILY MEDICINE

## 2024-02-05 PROCEDURE — 82306 VITAMIN D 25 HYDROXY: CPT | Performed by: FAMILY MEDICINE

## 2024-02-05 PROCEDURE — 84443 ASSAY THYROID STIM HORMONE: CPT | Performed by: FAMILY MEDICINE

## 2024-02-05 PROCEDURE — 80061 LIPID PANEL: CPT | Performed by: FAMILY MEDICINE

## 2024-02-05 PROCEDURE — 93242 EXT ECG>48HR<7D RECORDING: CPT | Performed by: FAMILY MEDICINE

## 2024-02-05 PROCEDURE — 80053 COMPREHEN METABOLIC PANEL: CPT | Performed by: FAMILY MEDICINE

## 2024-02-05 PROCEDURE — 82043 UR ALBUMIN QUANTITATIVE: CPT | Performed by: FAMILY MEDICINE

## 2024-02-05 PROCEDURE — 85027 COMPLETE CBC AUTOMATED: CPT | Performed by: FAMILY MEDICINE

## 2024-02-05 PROCEDURE — 99214 OFFICE O/P EST MOD 30 MIN: CPT | Mod: 25 | Performed by: FAMILY MEDICINE

## 2024-02-05 PROCEDURE — G0439 PPPS, SUBSEQ VISIT: HCPCS | Performed by: FAMILY MEDICINE

## 2024-02-05 PROCEDURE — 36415 COLL VENOUS BLD VENIPUNCTURE: CPT | Performed by: FAMILY MEDICINE

## 2024-02-05 RX ORDER — LISINOPRIL 5 MG/1
5 TABLET ORAL DAILY
Qty: 90 TABLET | Refills: 3 | Status: SHIPPED | OUTPATIENT
Start: 2024-02-05 | End: 2024-04-25

## 2024-02-05 RX ORDER — PROPRANOLOL HCL 60 MG
60 CAPSULE, EXTENDED RELEASE 24HR ORAL DAILY
Qty: 90 CAPSULE | Refills: 3 | Status: SHIPPED | OUTPATIENT
Start: 2024-02-05

## 2024-02-05 RX ORDER — TRAZODONE HYDROCHLORIDE 50 MG/1
25-50 TABLET, FILM COATED ORAL
Qty: 90 TABLET | Refills: 3 | Status: SHIPPED | OUTPATIENT
Start: 2024-02-05 | End: 2024-06-26

## 2024-02-05 NOTE — PATIENT INSTRUCTIONS
"Eating Healthy Foods: Care Instructions  With every meal, you can make healthy food choices. Try to eat a variety of fruits, vegetables, whole grains, lean proteins, and low-fat dairy products. This can help you get the right balance of nutrients, including vitamins and minerals. Small changes add up over time. You can start by adding one healthy food to your meals each day.    Try to make half your plate fruits and vegetables, one-fourth whole grains, and one-fourth lean proteins. Try including dairy with your meals.   Eat more fruits and vegetables. Try to have them with most meals and snacks.   Foods for healthy eating    Fruits    These can be fresh, frozen, canned, or dried.  Try to choose whole fruit rather than fruit juice.  Eat a variety of colors.    Vegetables    These can be fresh, frozen, canned, or dried.  Beans, peas, and lentils count too.    Whole grains    Choose whole-grain breads, cereals, and noodles.  Try brown rice.    Lean proteins    These can include lean meat, poultry, fish, and eggs.  You can also have tofu, beans, peas, lentils, nuts, and seeds.    Dairy    Try milk, yogurt, and cheese.  Choose low-fat or fat-free when you can.  If you need to, use lactose-free milk or fortified plant-based milk products, such as soy milk.    Water    Drink water when you're thirsty.  Limit sugar-sweetened drinks, including soda, fruit drinks, and sports drinks.  Where can you learn more?  Go to https://www.WHATT.net/patiented  Enter T756 in the search box to learn more about \"Eating Healthy Foods: Care Instructions.\"  Current as of: February 28, 2023               Content Version: 13.8    0330-0462 [employer].   Care instructions adapted under license by your healthcare professional. If you have questions about a medical condition or this instruction, always ask your healthcare professional. [employer] disclaims any warranty or liability for your use of this " information.      Nutrition for Older Adults: Care Instructions  Overview     Good nutrition is important at any age. But it is especially important for older adults. Eating a healthy diet helps keep your body strong. And it can help lower your risk for disease.  As you get older, your body needs more of certain nutrients. These include vitamin B12, calcium, and vitamin D. But it may be harder for you to get these and other important nutrients. This could be for many reasons. You may not feel as hungry as you used to. Or you could have problems with your teeth or mouth that make it hard to chew. Or you may not enjoy planning and preparing meals, especially if you live alone.  Talk with your doctor if you want help getting the most nutrition from what you eat. The doctor may have you work with a dietitian to help you plan meals.  Follow-up care is a key part of your treatment and safety. Be sure to make and go to all appointments, and call your doctor if you are having problems. It's also a good idea to know your test results and keep a list of the medicines you take.  How can you care for yourself at home?  To stay healthy  Eat a variety of foods. The more you vary the foods you eat, the more vitamins, minerals, and other nutrients you get.  Take a multivitamin every day. Choose one with about 100% of the daily value (DV) for vitamins and minerals. Do not take more than 100% of the daily value for any vitamin or mineral unless your doctor tells you to. Talk with your doctor if you are not sure which multivitamin is right for you.  Eat lots of fruits and vegetables. Fresh, frozen, or no-salt canned vegetables and fruits in their own juice or light syrup are good choices.  Include foods that are high in vitamin B12 in your diet. Good choices are fortified breakfast cereal, nonfat or low-fat milk and other dairy products, meat, poultry, fish, and eggs.  Get enough calcium and vitamin D. Good choices include nonfat or  low-fat milk, cheese, and yogurt. Other good options are tofu, orange juice with added calcium, and some leafy green vegetables, such as osiris greens and kale. If you don't use milk products, talk to your doctor about calcium and vitamin D supplements.  Eat protein foods every day. Good choices include lean meat, fish, poultry, eggs, and cheese. Other good options are cooked beans, peanut butter, and nuts and seeds.  Choose whole grains for half of the grains you eat. Look for 100% whole wheat bread, whole-grain cereals, brown rice, and other whole grains.  If you have constipation  Eat high-fiber foods every day. These include fruits, vegetables, cooked dried beans, and whole grains.  Drink plenty of fluids. If you have kidney, heart, or liver disease and have to limit fluids, talk with your doctor before you increase the amount of fluids you drink.  Ask your doctor if stool softeners may help keep your bowels regular.  If you have mouth problems that make chewing hard  Pick canned or cooked fruits and vegetables. These are often softer.  Chop or shred meat, poultry, and fish. Add sauce or gravy to the meat to help keep it moist.  Pick other protein foods that are soft. These include cheese, peanut butter, cooked beans, cottage cheese, and eggs.  If you have trouble shopping for yourself  Ask a local food store to deliver groceries to your home.  Contact a volunteer center and ask for help.  Ask a family member or neighbor to help you.  If you have trouble preparing meals  If you are able, take a cooking class.  Use a microwave oven to cook TV dinners and other frozen or prepared foods.  Take part in group meal programs. You can find these through senior citizen programs.  Have meals brought to your home. Your community may offer programs that deliver meals, such as Meals on Wheels.  If your appetite is poor  Try eating smaller amounts of food more often. For example, eat 4 or 5 small meals a day instead of 1 or  "2 large meals.  Eat with family and friends. Or take part in group meal programs offered through volunteer programs. Eating with others may help your appetite. And it helps you be more social.  Ask your doctor if your medicines could cause appetite or taste problems. If so, ask about changing medicines.  Add spices and herbs to increase the flavor of food.  If you think you are depressed, ask your doctor for help. Depression can affect your appetite. And it can make it hard to do everyday activities like grocery shopping and making meals. Treatment can help.  When should you call for help?  Watch closely for changes in your health, and be sure to contact your doctor if you have any problems.  Where can you learn more?  Go to https://www.EVRST.net/patiented  Enter L643 in the search box to learn more about \"Nutrition for Older Adults: Care Instructions.\"  Current as of: February 26, 2023               Content Version: 13.8    1635-9413 [employer].   Care instructions adapted under license by your healthcare professional. If you have questions about a medical condition or this instruction, always ask your healthcare professional. [employer] disclaims any warranty or liability for your use of this information.      Preventing Falls: Care Instructions  Injuries and health problems such as trouble walking or poor eyesight can increase your risk of falling. So can some medicines. But there are things you can do to help prevent falls. You can exercise to get stronger. You can also arrange your home to make it safer.    Talk to your doctor about the medicines you take. Ask if any of them increase the risk of falls and whether they can be changed or stopped.   Try to exercise regularly. It can help improve your strength and balance. This can help lower your risk of falling.     Practice fall safety and prevention.    Wear low-heeled shoes that fit well and give your feet good support. Talk to " "your doctor if you have foot problems that make this hard.  Carry a cellphone or wear a medical alert device that you can use to call for help.  Use stepladders instead of chairs to reach high objects. Don't climb if you're at risk for falls. Ask for help, if needed.  Wear the correct eyeglasses, if you need them.    Make your home safer.    Remove rugs, cords, clutter, and furniture from walkways.  Keep your house well lit. Use night-lights in hallways and bathrooms.  Install and use sturdy handrails on stairways.  Wear nonskid footwear, even inside. Don't walk barefoot or in socks without shoes.    Be safe outside.    Use handrails, curb cuts, and ramps whenever possible.  Keep your hands free by using a shoulder bag or backpack.  Try to walk in well-lit areas. Watch out for uneven ground, changes in pavement, and debris.  Be careful in the winter. Walk on the grass or gravel when sidewalks are slippery. Use de-icer on steps and walkways. Add non-slip devices to shoes.    Put grab bars and nonskid mats in your shower or tub and near the toilet. Try to use a shower chair or bath bench when bathing.   Get into a tub or shower by putting in your weaker leg first. Get out with your strong side first. Have a phone or medical alert device in the bathroom with you.   Where can you learn more?  Go to https://www.in[employer].net/patiented  Enter G117 in the search box to learn more about \"Preventing Falls: Care Instructions.\"  Current as of: July 18, 2023               Content Version: 13.8    8714-5247 [employer].   Care instructions adapted under license by your healthcare professional. If you have questions about a medical condition or this instruction, always ask your healthcare professional. [employer] disclaims any warranty or liability for your use of this information.      Preventive Care Advice   This is general advice given by our system to help you stay healthy. However, your care team " may have specific advice just for you. Please talk to your care team about your preventive care needs.  Nutrition  Eat 5 or more servings of fruits and vegetables each day.  Try wheat bread, brown rice and whole grain pasta (instead of white bread, rice, and pasta).  Get enough calcium and vitamin D. Check the label on foods and aim for 100% of the RDA (recommended daily allowance).  Lifestyle  Exercise at least 150 minutes each week  (30 minutes a day, 5 days a week).  Do muscle strengthening activities 2 days a week. These help control your weight and prevent disease.  No smoking.  Wear sunscreen to prevent skin cancer.  Have a dental exam and cleaning every 6 months.  Yearly exams  See your health care team every year to talk about:  Any changes in your health.  Any medicines your care team has prescribed.  Preventive care, family planning, and ways to prevent chronic diseases.  Shots (vaccines)   HPV shots (up to age 26), if you've never had them before.  Hepatitis B shots (up to age 59), if you've never had them before.  COVID-19 shot: Get this shot when it's due.  Flu shot: Get a flu shot every year.  Tetanus shot: Get a tetanus shot every 10 years.  Pneumococcal, hepatitis A, and RSV shots: Ask your care team if you need these based on your risk.  Shingles shot (for age 50 and up)  General health tests  Diabetes screening:  Starting at age 35, Get screened for diabetes at least every 3 years.  If you are younger than age 35, ask your care team if you should be screened for diabetes.  Cholesterol test: At age 39, start having a cholesterol test every 5 years, or more often if advised.  Bone density scan (DEXA): At age 50, ask your care team if you should have this scan for osteoporosis (brittle bones).  Hepatitis C: Get tested at least once in your life.  STIs (sexually transmitted infections)  Before age 24: Ask your care team if you should be screened for STIs.  After age 24: Get screened for STIs if you're  at risk. You are at risk for STIs (including HIV) if:  You are sexually active with more than one person.  You don't use condoms every time.  You or a partner was diagnosed with a sexually transmitted infection.  If you are at risk for HIV, ask about PrEP medicine to prevent HIV.  Get tested for HIV at least once in your life, whether you are at risk for HIV or not.  Cancer screening tests  Cervical cancer screening: If you have a cervix, begin getting regular cervical cancer screening tests starting at age 21.  Breast cancer scan (mammogram): If you've ever had breasts, begin having regular mammograms starting at age 40. This is a scan to check for breast cancer.  Colon cancer screening: It is important to start screening for colon cancer at age 45.  Have a colonoscopy test every 10 years (or more often if you're at risk) Or, ask your provider about stool tests like a FIT test every year or Cologuard test every 3 years.  To learn more about your testing options, visit:   [email or web address].  For help making a decision, visit:   [email or web address].RedRover/lj36862.  Prostate cancer screening test: If you have a prostate, ask your care team if a prostate cancer screening test (PSA) at age 55 is right for you.  Lung cancer screening: If you are a current or former smoker ages 50 to 80, ask your care team if ongoing lung cancer screenings are right for you.  For informational purposes only. Not to replace the advice of your health care provider. Copyright   2023 [facility]. All rights reserved. Clinically reviewed by the [facility]. [employer] [id number] - REV 01/24.    Discussed need for resistance training to help keep bones strong and decrease age -related muscle loss, decreasing insulin resistance and increasing basal metabolic rate to help burn more calories at rest and with exertion.     Clinical References    Resistance Training With Free Weights:  Exercises  Introduction  Here are some examples of exercises for resistance training. Start each exercise slowly. Ease off the exercise if you start to have pain.  Your doctor or physical therapist will tell you when you can start these exercises and which ones will work best for you.  How to do the exercises  Chest fly    Lie on a bench or exercise ball, and hold the weights straight up over your chest. Do not lock your elbows. You can keep them slightly bent if that is comfortable for you.  Slowly lower your arms, keeping them extended, until the weights are level with your chest, or slightly lower.  Slowly raise your arms until you are in the starting position.  Repeat 8 to 12 times.  Rest for a minute, and repeat the exercise.  Arm raise to the side (elbows bent)    Stand with your feet shoulder-width apart and your knees slightly bent. Or sit up straight in a chair.  Hold a 1- to 2-pound weight in each hand. The weight may be a dumbbell, a can of food, or a filled water bottle.  Bend your elbows 90 degrees while keeping them at your sides. With your palms facing in, hold the weights straight in front of you.  Slowly lift the weights and your elbows out to the sides to shoulder level, keeping your elbows bent. Keep your shoulders down and relaxed as you lift. If you find that you are shrugging your shoulders up toward your ears, your weights may be too heavy. Try using lighter weights (or even no weights).  Slowly lower the weights and your elbows until your elbows are back at your sides.  Repeat 8 to 12 times.  Biceps curls    Sit leaning forward with your legs slightly spread and your left hand on your left thigh.  Hold the weight in your right hand, and place your right elbow on your right thigh.  Slowly curl the weight up and toward your chest.  Slowly lower the weight to the original position.  Repeat 8 to 12 times.  Rest for a minute, and repeat the exercise.  Do the same exercise with your other  arm.  Follow-up care is a key part of your treatment and safety. Be sure to make and go to all appointments, and call your doctor if you are having problems. It's also a good idea to know your test results and keep a list of the medicines you take.  Current as of: June 6, 2023               Content Version: 13.8    8195-2107 [employer].   Care instructions adapted under license by your healthcare professional. If you have questions about a medical condition or this instruction, always ask your healthcare professional. [employer] disclaims any warranty or liability for your use of this information.        Resistance Training With Surgical Tubing: Exercises  Introduction  Here are some examples of exercises for resistance training. Start each exercise slowly. Ease off the exercise if you start to have pain.  Your doctor or physical therapist will tell you when you can start these exercises and which ones will work best for you.  How to do the exercises  Side pull    Sit or stand up straight. Grasp an exercise band with your hands about shoulder-width apart.  Raise both arms overhead, palms of your hands facing forward.  Slowly pull one arm down and to the side, bending your elbow and stretching the band until your elbow is at shoulder height. Hold for 1 to 2 seconds.  Slowly return to the starting position with your arms straight up.  Repeat with the other arm.  Repeat 8 to 12 times with each arm.  Overhead pull    Sit or stand up straight. Grasp an exercise band with your hands about shoulder-width apart.  Raise both arms overhead, palms of your hands facing forward.  Slowly pull your hands apart, stretching the band. Hold for 1 to 2 seconds.  Slowly return to the starting position with your arms straight up.  Repeat 8 to 12 times.  Up-down pull    Sit or stand up straight. Grasp an exercise band with your hands about shoulder width apart.  Raise both arms overhead.  Bend your elbows until  they are at shoulder height, with the stretched band either behind or in front of your head. Hold for 1 to 2 seconds.  Slowly return to the starting position with your arms straight up.  Repeat 8 to 12 times.  Chest-level pull    Sit or stand up straight. Grasp an exercise band with your hands about shoulder-width apart.  Raise your arms to chest level and bend your elbows.  Slowly pull your hands apart and your shoulder blades together, stretching the band. Hold for 1 to 2 seconds. Try to keep your hands up at your chest level, and do not pull your shoulders up toward your ears.  Slowly return to your starting position.  Repeat 8 to 12 times.  Hip-level pull    Stand or sit up straight in a chair without arms. Grasp an exercise band with your hands about shoulder-width apart.  Hold your hands at the level of your hips, or near your lap if you are sitting down.  Slowly pull your hands apart, stretching the band. Hold for 1 or 2 seconds.  Slowly return to your starting position.  Repeat 8 to 12 times.  Follow-up care is a key part of your treatment and safety. Be sure to make and go to all appointments, and call your doctor if you are having problems. It's also a good idea to know your test results and keep a list of the medicines you take.  Current as of: June 6, 2023               Content Version: 13.8    9201-4466 [employer].   Care instructions adapted under license by your healthcare professional. If you have questions about a medical condition or this instruction, always ask your healthcare professional. [employer] disclaims any warranty or liability for your use of this information.         Fitness: Increasing Your Core Stability (02:03)  Your health professional recommends that you watch this short online health video.  Learn how to keep your core strong and prevent injury with two simple exercises.  Purpose:  Shows belly tightening and bridging to improve core stability.  Emphasizes how a strong core helps prevent injury.  Goal:  The user will learn belly tightening and bridging to improve core stability.      How to watch the video     Scan the QR code   OR Visit the website    [email or web address]   Current as of: July 18, 2023               Content Version: 13.8    4686-3599 [employer].   Care instructions adapted under license by your healthcare professional. If you have questions about a medical condition or this instruction, always ask your healthcare professional. [employer] disclaims any warranty or liability for your use of this information.          Muscle Conditioning: Exercises  Introduction  Here are some examples of exercises for muscle conditioning. Start each exercise slowly. Ease off the exercise if you start to have pain.  Your doctor or physical therapist will tell you when you can start these exercises and which ones will work best for you.  How to do the exercises  Wall push-ups    When you can do this exercise against a wall comfortably (without your muscles feeling tired), you can try it against a counter. Start with 5 repetitions again and work up to 8 to 12. You can then slowly progress to the end of a couch or a sturdy chair, and finally to the floor.  Stand facing a wall, about 12 to 18 inches away.  Place your hands on the wall at shoulder height.  Slowly bend your elbows and bring your face toward the wall, moving your hips and shoulders forward together.  Push slowly back to the starting position.  Start with 5 repetitions and work up to 8 to 12.  Rest for a minute, and repeat the exercise.    Side-lying leg lift    If this exercise becomes easy, you can add a light weight around your ankle or tie an elastic resistance band to both ankles.  Lie on your side, with your legs extended. Keep your hips straight up and down during this exercise. Do not let your top hip rock toward the back. Support your head  with your hand, and place the other hand on the floor near your waist.  Slowly raise your upper leg until it is about in line with your shoulder. Keep your toes pointed forward.  Slowly lower your leg to the starting position.  Repeat 8 to 12 times.  Rest for a minute, and repeat the exercise.  Turn to your other side and do the same exercise with your other leg.  Shallow standing knee bends    Stand with your hands lightly resting on a counter or chair in front of you with your feet shoulder-width apart.  Slowly bend your knees so that you squat down just like you were going to sit in a chair. Make sure your knees do not go in front of your toes.  Lower yourself about 6 inches. Your heels should remain on the floor at all times.  Rise slowly to a standing position.  Repeat 8 to 12 times.  Rest for a minute, and repeat the exercise.  Follow-up care is a key part of your treatment and safety. Be sure to make and go to all appointments, and call your doctor if you are having problems. It's also a good idea to know your test results and keep a list of the medicines you take.  Current as of: June 6, 2023               Content Version: 13.8    1058-8157 [employer].   Care instructions adapted under license by your healthcare professional. If you have questions about a medical condition or this instruction, always ask your healthcare professional. [employer] disclaims any warranty or liability for your use of this information.         How to Do the Wall Sit Exercise (00:46)  Your health professional recommends that you watch this short online health video.  Learn how to do the wall sit exercise to increase strength and stability in your core and your legs.  Purpose:  Demonstrates the steps required to perform the wall sit exercise to increase strength and stability in the core and legs.  Goal:  The user will learn how to do the wall sit exercise to increase strength and stability in the core  "and legs.      How to watch the video     Scan the QR code   OR Visit the website    [email or web address]   Current as of: July 18, 2023               Content Version: 13.8    7129-0029 [employer].   Care instructions adapted under license by your healthcare professional. If you have questions about a medical condition or this instruction, always ask your healthcare professional. [employer] disclaims any warranty or liability for your use of this information.       Thank you so much or choosing [facility]  for your Health Care. It was a pleasure seeing you at your visit today! Please contact us with any questions or concerns you may have.                   Helga Ibarra MD                              To reach your [facility] care team after hours call:   [phone] press #2 \"to speak with your care team\".  This will get you to our clinic instead of routing to central [facility]  scheduling.     PLEASE NOTE OUR HOURS HAVE CHANGED secondary to COVID-19 coronavirus pandemic, as we are trying to minimize patient exposure to the virus,  which is now widespread in the [facility].  These hours may change with very little notice.  We apologize for any inconvenience.       Our current clinic hours are:          Monday- Thursday   7:00am - 6:00pm  in person.      Friday  7:00am- 5:00pm                       Saturday and Sunday : Closed to in person and virtual visits        We have telephone and virtual visit times available between    7:00am - 6pm on Monday-Friday as well.                                                Phone:  [phone]      Our pharmacy hours: Monday through Friday 8:00am to 5:00pm                        Saturday - 9:00 am to 12 noon       Sunday : Closed.              Phone:  [phone]              ###  Please note: at this time we are not accepting any walk-in visits. " ###      There is also information available at our web site:  [email or web address]    If your provider ordered any lab tests and you do not receive the results within 10 business days, please call the clinic.    If you need a medication refill please contact your pharmacy.  Please allow 3 business days for your refill to be completed.    Our clinic offers telephone visits and e visits.  Please ask one of your team members to explain more.      Use [employer] (secure email communication and access to your chart) to send your primary care provider a message or make an appointment. Ask someone on your Team how to sign up for [employer].

## 2024-02-05 NOTE — PROGRESS NOTES
Jolynn Haji arrived here on 2/5/2024 10:05 AM for 3-7 Days  Zio monitor placement per ordering provider Stacey for the diagnosis palpatations.  Patient s skin was prepped per protocol.  Zio monitor was placed.  Instructions were reviewed with and given to the patient.  Patient verbalized understanding of wear, troubleshooting and monitor return instructions.   Answers submitted by the patient for this visit:  Patient Health Questionnaire (Submitted on 2/4/2024)  PHQ9 TOTAL SCORE: 0  TIFFANY-7 (Submitted on 2/4/2024)  TIFFANY 7 TOTAL SCORE: 2

## 2024-02-05 NOTE — PROGRESS NOTES
Preventive Care Visit  [facility] [place]  Helgamony Ibarra MD, Family Medicine  Feb 5, 2024    Assessment & Plan       ICD-10-CM    1. Encounter for Medicare annual wellness exam  Z00.00 REVIEW OF HEALTH MAINTENANCE PROTOCOL ORDERS      2. Major depressive disorder, recurrent, mild (H24)- in partial remission on medications  F33.0       3. Adjustment disorder with mixed anxiety and depressed mood- mild  at this time -in partial remission on medications  F43.23       4. Essential hypertension with goal blood pressure less than 140/90  I10 Albumin Random Urine Quantitative with Creat Ratio     PRIMARY CARE FOLLOW-UP SCHEDULING     lisinopril (ZESTRIL) 5 MG tablet     TSH with free T4 reflex     Comprehensive metabolic panel     CBC with platelets      5. TIFFANY (generalized anxiety disorder)  F41.1 propranolol ER (INDERAL LA) 60 MG 24 hr capsule      6. Tremor  R25.1 propranolol ER (INDERAL LA) 60 MG 24 hr capsule      7. Insomnia, unspecified type  G47.00 traZODone (DESYREL) 50 MG tablet      8. Adjustment insomnia - resolved as of 5/5/2023 - off of ambien with psychiatry's okay   F51.02       9. Bilateral lower extremity edema- trace pitting edema distal LE to mid pretibial areas Left > right  R60.0       10. Drug-induced parkinsonism (H)- Abilify - generic = ARIPiprazole( 5mg) - RESOLVED OFF ABILIFY   G21.19       11. Palpitations  R00.2 ZIO PATCH 3-7 DAYS (additional cost to patient)     ZIO PATCH 3-7 DAYS APPLICATION     TSH with free T4 reflex      12. Osteopenia, unspecified location  M85.80 25 Hydroxyvitamin D2 and D3      13. Hyperlipidemia LDL goal <130  E78.5 PRIMARY CARE FOLLOW-UP SCHEDULING     Lipid panel reflex to direct LDL Fasting          Discussed need for resistance training to help keep bones strong and decrease age -related muscle loss, decreasing insulin resistance and increasing basal metabolic rate to help burn more calories at rest and with exertion.     Discussed $80  charge for ZioPatch monitor  placement and pt agrees to that today.     Ordering of each unique test  Prescription drug management  32 minutes spent by me on the date of the encounter doing chart review, history and exam, documentation and further activities per the note      Counseling:   Appropriate preventive services were discussed with this patient, including applicable screening as appropriate for fall prevention, nutrition, physical activity, Tobacco-use cessation, weight loss and cognition.  Checklist reviewing preventive services available has been given to the patient.  Reviewed patient's diet, addressing concerns and/or questions.     Patient has been advised of split billing requirements and indicates understanding: Yes    MEDICATIONS:   Orders Placed This Encounter   Medications    lisinopril (ZESTRIL) 5 MG tablet     Sig: Take 1 tablet (5 mg) by mouth daily     Dispense:  90 tablet     Refill:  3     Please note dosage change - ### Profile Rx: patient will contact pharmacy when needed ###    propranolol ER (INDERAL LA) 60 MG 24 hr capsule     Sig: Take 1 capsule (60 mg) by mouth daily     Dispense:  90 capsule     Refill:  3     ### Profile Rx: patient will contact pharmacy when needed ###    traZODone (DESYREL) 50 MG tablet     Sig: Take 0.5-1 tablets (25-50 mg) by mouth nightly as needed for sleep     Dispense:  90 tablet     Refill:  3     ### Profile Rx: patient will contact pharmacy when needed ###          - Continue other medications without change  Work on weight loss  Regular exercise  See Patient Instructions    Chanelle Tristan is a 68 year old, presenting for the followin. Encounter for Medicare annual wellness exam    2. Major depressive disorder, recurrent, mild (H24)- in partial remission on medications    3. Adjustment disorder with mixed anxiety and depressed mood- mild  at this time -in partial remission on medications    4. Essential hypertension with goal blood pressure less  than 140/90    5. TIFFANY (generalized anxiety disorder)    6. Tremor    7. Insomnia, unspecified type    8. Adjustment insomnia - resolved as of 5/5/2023 - off of ambien with psychiatry's okay     9. Bilateral lower extremity edema- trace pitting edema distal LE to mid pretibial areas Left > right    10. Drug-induced parkinsonism (H)- Abilify - generic = ARIPiprazole( 5mg) - RESOLVED OFF ABILIFY     11. Palpitations    12. Osteopenia, unspecified location    13. Hyperlipidemia LDL goal <130        Physical        2/5/2024     8:46 AM   Additional Questions   Roomed by christi voss         2/5/2024     8:46 AM   Patient Reported Additional Medications   Patient reports taking the following new medications none         Health Care Directive  Patient does not have a Health Care Directive or Living Will: Patient states has Advance Directive and will bring in a copy to clinic.    HPI      Hypertension Follow-up - 136/86 today - will increase to 5mg daily for a little better control.      Do you check your blood pressure regularly outside of the clinic? Yes 127/70 sometimes.   Are you following a low salt diet? No  Are your blood pressures ever more than 140 on the top number (systolic) OR more   than 90 on the bottom number (diastolic), for example 140/90? No    Depression and Anxiety Follow-Up- much better than 9 months ago - have been seeing patient almost monthly and psychiatry Dr. Pompa as well as doing counseling with her therapist every other week to every 3 weeks now. Was going weekly for a while.    How are you doing with your depression since your last visit? No change  How are you doing with your anxiety since your last visit?  No change  Are you having other symptoms that might be associated with depression or anxiety? Yes:  rapid heart beat a little bit when she's a little apprehensive or reading something concerning on the internet.    Happens once daily or so.  ? Happening with exercise once in a while.  Starts with  heavy breathing, then feels like heart is beating too fast, then will go for a walk.seems to be worse with stress.  Gets a little chest discomfort when it happens.  Discussed doing a ZioPatch Monitor.  Has limited her caffeine to 1 cup/day.    Have you had a significant life event? Grief or Loss -several elderly family members have passed in the last couple of months.   Do you have any concerns with your use of alcohol or other drugs? No    Social History     Tobacco Use    Smoking status: Never    Smokeless tobacco: Never   Vaping Use    Vaping Use: Never used   Substance Use Topics    Alcohol use: Yes     Alcohol/week: 0.0 - 1.0 standard drinks of alcohol     Comment: 2 per month    Drug use: No     Comment: no herbal meds either         12/6/2023     4:54 PM 1/17/2024     4:27 PM 2/4/2024     5:22 PM   PHQ   PHQ-9 Total Score 0 1 0   Q9: Thoughts of better off dead/self-harm past 2 weeks Not at all Not at all Not at all         12/4/2023     7:25 PM 12/6/2023     4:55 PM 2/4/2024     5:23 PM   TIFFANY-7 SCORE   Total Score 1 (minimal anxiety) 1 (minimal anxiety) 2 (minimal anxiety)   Total Score 1 1 2         2/4/2024     5:22 PM   Last PHQ-9   1.  Little interest or pleasure in doing things 0   2.  Feeling down, depressed, or hopeless 0   3.  Trouble falling or staying asleep, or sleeping too much 0   4.  Feeling tired or having little energy 0   5.  Poor appetite or overeating 0   6.  Feeling bad about yourself 0   7.  Trouble concentrating 0   8.  Moving slowly or restless 0   Q9: Thoughts of better off dead/self-harm past 2 weeks 0   PHQ-9 Total Score 0         2/4/2024     5:23 PM   TIFFANY-7    1. Feeling nervous, anxious, or on edge 2   2. Not being able to stop or control worrying 0   3. Worrying too much about different things 0   4. Trouble relaxing 0   5. Being so restless that it is hard to sit still 0   6. Becoming easily annoyed or irritable 0   7. Feeling afraid, as if something awful might happen 0    TIFFANY-7 Total Score 2   If you checked any problems, how difficult have they made it for you to do your work, take care of things at home, or get along with other people? Not difficult at all       Suicide Assessment Five-step Evaluation and Treatment (SAFE-T)    Aunt Piper  recently at age 99 - passed away recently.         2024   General Health   How would you rate your overall physical health? Excellent   Feel stress (tense, anxious, or unable to sleep) Not at all         2024   Nutrition   Diet: Regular (no restrictions)         2024   Exercise   Days per week of moderate/strenous exercise 4 days   Average minutes spent exercising at this level 40 min         2024   Social Factors   Frequency of gathering with friends or relatives Three times a week   Worry food won't last until get money to buy more No   Food not last or not have enough money for food? No   Do you have housing?  Yes   Are you worried about losing your housing? No   Lack of transportation? No   Unable to get utilities (heat,electricity)? No         2024   Fall Risk   Fallen 2 or more times in the past year? No   Trouble with walking or balance? Yes           2024   Activities of Daily Living- Home Safety   Needs help with the following daily activites None of the above   Safety concerns in the home None of the above         2024   Dental   Dentist two times every year? Yes         2024   Hearing Screening   Hearing concerns? None of the above         2024   Driving Risk Screening   Patient/family members have concerns about driving No         2024   General Alertness/Fatigue Screening   Have you been more tired than usual lately? No         2024   Urinary Incontinence Screening   Bothered by leaking urine in past 6 months No         2024   TB Screening   Were you born outside of [place]?  No       Today's PHQ-9 Score:       2024     5:22 PM   PHQ-9 SCORE   PHQ-9 Total Score MyChart 0   PHQ-9  "Total Score 0         2/4/2024   Substance Use   Alcohol more than 3/day or more than 7/wk No   Do you have a current opioid prescription? No   How severe/bad is pain from 1 to 10? 4/10   Do you use any other substances recreationally? No     Social History     Tobacco Use    Smoking status: Never    Smokeless tobacco: Never   Vaping Use    Vaping Use: Never used   Substance Use Topics    Alcohol use: Yes     Alcohol/week: 0.0 - 1.0 standard drinks of alcohol     Comment: 2 per month    Drug use: No     Comment: no herbal meds either            No data to display                 Annual screen by mutual decision with patient      History of abnormal Pap smear: NO - age 64 - see link Cervical Cytology Screening Guidelines        Latest Ref Rng & Units 7/25/2019     2:06 PM 7/24/2019     3:40 PM 4/21/2016    12:00 AM   PAP / HPV   PAP (Historical)   NIL  NIL    HPV 16 DNA NEG^Negative Negative      HPV 18 DNA NEG^Negative Negative      Other HR HPV NEG^Negative Negative        The 10-year ASCVD risk score (Luca MAS, et al., 2019) is: 12.1%    Values used to calculate the score:      Age: 68 years      Sex: Female      Is Non- : No      Diabetic: No      Tobacco smoker: No      Systolic Blood Pressure: 136 mmHg      Is BP treated: Yes      HDL Cholesterol: 81 mg/dL      Total Cholesterol: 222 mg/dL    Last DEXA scan on 4/4/2023.  \"Low Bone Density (osteopenia).  Degenerative changes at the lumbar spine may falsely elevate results.      There has been no significant change in bone density of the lumbar spine. There has been significant decrease in bone density of the hip(s).      Recommendations include ensuring adequate Calcium and Vitamin D.     The current NOF Guidelines recommend treatment for patients with prior hip or vertebral fracture, T-score -2.5 or below, or 10 year risk of any major osteoporotic fracture 20% or greater, or 10 year risk of hip fracture 3% or greater as calculated using " "the FRAX calculator (www.shef.ac.uk/FRAX or you can google \"FRAX\").    This patient's risks based on available information, with the use of FRAX, are 9.5 % for major osteoporotic fracture and 1.6 % for hip fracture.   Based on these guidelines, treatment (in addition to calcium and vitamin D) is not recommended for this patient, after ruling out other causes of osteoporosis.\"        Reviewed and updated as needed this visit by Provider     Meds                Past Medical History:   Diagnosis Date    Abnormal glandular Papanicolaou smear of cervix- ASCUS in 2003    ASC H-     Basal cell carcinoma of leg, right 2016    Dr Rogers    Colon polyps 2018    tubular adenoam - due 5 yrs - 2 mm    Concussion without loss of consciousness 2017    Hidradenitis     left  groin    HSIL (high grade squamous intraepithelial lesion) on Pap smear of cervix 2000    Normal paps from  to /    Hypertension goal BP (blood pressure) < 140/90     OA (osteoarthritis) of knee     moderate    Postmenopausal since 2008     Shingles 2010    left     Squamous cell carcinoma of skin, unspecified     Synovial cyst of popliteal space      Past Surgical History:   Procedure Laterality Date    COLONOSCOPY  2018    polyps, tubular - 2 mm - due 5 yrs    COLONOSCOPY N/A 2023    Procedure: Colonoscopy;  Surgeon: Yogesh Cedeño MD;  Location: [facility] NONSPECIFIC PROCEDURE      Colposcopy     OB History    Para Term  AB Living   4 3 2 0 1 2   SAB IAB Ectopic Multiple Live Births   1 0 0 0 0      # Outcome Date GA Lbr Skyler/2nd Weight Sex Delivery Anes PTL Lv   4 SAB            3 Para            2 Term            1 Term               Obstetric Comments   FMP 2008   Children Robbie : 1986 and Malini : 1983- both type 1 diabetics from childhood      Lab work is in process  Labs reviewed in EPIC  BP Readings from Last 3 Encounters:   24 136/86   23 " 110/70   09/27/23 110/70    Wt Readings from Last 3 Encounters:   02/05/24 67.6 kg (149 lb)   12/06/23 66.7 kg (147 lb)   09/27/23 65.3 kg (144 lb)                  Patient Active Problem List   Diagnosis    Essential hypertension with goal blood pressure less than 140/90    Hidradenitis    Localized osteoarthritis of hand    Synovial cyst of popliteal space    DERMATOPHYTOSIS OF NAIL- toenails     Lipidosis    Osteopenia    Postmenopausal    NUMBNESS AND TINGLING OF FOOT - bilateral -mild     Numbness of feet- distal feet R>L     Stress incontinence, female - mild    Cough    Acute bronchitis- recurrent- ? related to mold in school building- pt has since retired and no further bronchitis    Rosacea    History of migraine headaches- assoc. with nausea/vomiting - resolved around menopause - were  premenstrual     Dupuytren's disease of palm - right 4th digit flexor tendon    Sun-damaged skin    Seasonal allergic rhinitis    Basal cell carcinoma of leg, right    Adjustment disorder with mixed anxiety and depressed mood- mild  at this time    Family history of celiac disease    TIFFANY (generalized anxiety disorder)    Insomnia, unspecified type    Chronic constipation    Raynaud's phenomenon without gangrene    Other secondary osteoarthritis of left knee    Primary localized osteoarthrosis of left lower leg    Hyperlipidemia LDL goal <130    Basal cell carcinoma    Colon polyps    Screening for cervical cancer    Adjustment insomnia - resolved as of 5/5/2023 - off of ambien with psychiatry's okay     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 - much improved - resolved as of 5/5/2023     Drug-induced parkinsonism (H)- Abilify - generic = ARIPiprazole( 5mg) - RESOLVED OFF ABILIFY     Mood disorder (H24)    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 - lower pretibial area     Bilateral lower extremity edema- trace pitting  edema distal LE to mid pretibial areas Left > right    Tremor    Major depressive disorder, recurrent, mild (H24)    Major depressive disorder, recurrent episode, moderate (H)     Past Surgical History:   Procedure Laterality Date    COLONOSCOPY  2018    polyps, tubular - 2 mm - due 5 yrs    COLONOSCOPY N/A 2023    Procedure: Colonoscopy;  Surgeon: Yogesh Cedeño MD;  Location: [facility] NONSPECIFIC PROCEDURE      Colposcopy       Social History     Tobacco Use    Smoking status: Never    Smokeless tobacco: Never   Substance Use Topics    Alcohol use: Yes     Alcohol/week: 0.0 - 1.0 standard drinks of alcohol     Comment: 2 per month     Family History   Problem Relation Age of Onset    Thyroid Disease Mother         Graves disease    Hypertension Mother     Diabetes Mother         type 2    Blood Disease Mother          from leukemia at age 78    Heart Disease Father 62        MV problem with CHF  at 62/ from MI    Hypertension Father     Diabetes Son         Juvenile Diabetes    Diabetes Daughter         Juvenile Diabetes    Cancer Maternal Uncle         type ??    Cancer Maternal Uncle         liver    Colon Cancer No family hx of          Current Outpatient Medications   Medication Sig Dispense Refill    clindamycin (CLEOCIN T) 1 % external lotion Apply topically 2 times daily 60 mL 3    lactobacillus rhamnosus, GG, (CULTURELL) capsule Take 1 capsule by mouth daily Taking every other day      lisinopril (ZESTRIL) 5 MG tablet Take 1 tablet (5 mg) by mouth daily 90 tablet 3    propranolol ER (INDERAL LA) 60 MG 24 hr capsule Take 1 capsule (60 mg) by mouth daily 90 capsule 3    traZODone (DESYREL) 50 MG tablet Take 0.5-1 tablets (25-50 mg) by mouth nightly as needed for sleep 90 tablet 3    venlafaxine (EFFEXOR XR) 75 MG 24 hr capsule Take 1 capsule (75 mg) by mouth daily 90 capsule 1     Allergies   Allergen Reactions    Abilify [Aripiprazole] Other (See Comments)      Suspected drug-induced Parkinsonism     Fosamax Diarrhea     Diarrhea, stomach cramps, jaw pain     Amoxicillin-Pot Clavulanate Itching and Rash    Amoxicillin-Pot Clavulanate Itching and Rash     Recent Labs   Lab Test 09/27/23  1739 02/08/23  1035 04/25/22  1642 01/31/22  0952 01/31/22  0952 01/13/21  0823 12/11/19  1129 05/11/17  1225 04/24/17  1604   A1C  --   --   --   --   --   --   --   --  5.5   LDL  --  129*  --   --  110* 123* 115*   < >  --    HDL  --  81  --   --  82 75 76   < >  --    TRIG  --  60  --   --  81 65 71   < >  --    ALT 15 19 21   < > 19 17 20   < > 22   CR 0.78 0.62 0.78   < > 0.68 0.68 0.70   < > 0.61   GFRESTIMATED 82 >90 83   < > >90 >90 90   < > >90  Non  GFR Calc     GFRESTBLACK  --   --   --   --   --  >90 >90   < > >90  African American GFR Calc     POTASSIUM 4.6 4.1 4.4   < > 4.1 4.1 4.8   < > 3.9   TSH  --  1.65 1.30   < > 1.53 2.07 0.96   < >  --     < > = values in this interval not displayed.      Current providers sharing in care for this patient include:  Patient Care Team:  Helga Ibarra MD as PCP - Kim Mittal APRN CNS as Nurse Practitioner (Clinical Nurse Specialist)  Helga Ibarra MD as Assigned PCP  Ligia Carbajal PA-C as Physician Assistant (Dermatology)  Judy Pompa DO as Assigned Behavioral Health Provider  Sheldon Rogers MD as Assigned Surgical Provider    The following health maintenance items are reviewed in Epic and correct as of today:  Health Maintenance   Topic Date Due    MICROALBUMIN  02/02/2024    COVID-19 Vaccine (7 - 2023-24 season) 03/21/2024 (Originally 11/25/2023)    PHQ-9  08/05/2024    BMP  09/27/2024    MAMMO SCREENING  01/10/2025    MEDICARE ANNUAL WELLNESS VISIT  02/05/2025    ANNUAL REVIEW OF HM ORDERS  02/05/2025    FALL RISK ASSESSMENT  02/05/2025    DEXA  04/04/2026    GLUCOSE  09/27/2026    LIPID  02/08/2028    ADVANCE CARE PLANNING  02/20/2028    COLORECTAL CANCER  "SCREENING  08/25/2028    DTAP/TDAP/TD IMMUNIZATION (3 - Td or Tdap) 01/13/2031    HEPATITIS C SCREENING  Completed    DEPRESSION ACTION PLAN  Completed    INFLUENZA VACCINE  Completed    Pneumococcal Vaccine: 65+ Years  Completed    ZOSTER IMMUNIZATION  Completed    RSV VACCINE (Pregnancy & 60+)  Completed    IPV IMMUNIZATION  Aged Out    HPV IMMUNIZATION  Aged Out    MENINGITIS IMMUNIZATION  Aged Out    RSV MONOCLONAL ANTIBODY  Aged Out     Review of Systems    Review of Systems  Constitutional, HEENT, cardiovascular, pulmonary, GI, , musculoskeletal, neuro, skin, endocrine and psych systems are negative, except as otherwise noted.     Objective    Exam  /86   Pulse 72   Temp 97.1  F (36.2  C)   Resp 16   Ht 1.715 m (5' 7.5\")   Wt 67.6 kg (149 lb)   LMP 06/28/2001   SpO2 100%   BMI 22.99 kg/m     Estimated body mass index is 22.99 kg/m  as calculated from the following:    Height as of this encounter: 1.715 m (5' 7.5\").    Weight as of this encounter: 67.6 kg (149 lb).    Physical Exam  GENERAL: alert and no distress  EYES: Eyes grossly normal to inspection, PERRL and conjunctivae and sclerae normal  HENT: ear canals and TM's normal, nose and mouth without ulcers or lesions  NECK: no adenopathy, no asymmetry, masses, or scars  RESP: lungs clear to auscultation - no rales, rhonchi or wheezes  BREAST: normal without masses, tenderness or nipple discharge and no palpable axillary masses or adenopathy  CV: regular rate and rhythm, normal S1 S2, no S3 or S4, no murmur, click or rub, no peripheral edema  ABDOMEN: soft, nontender, no hepatosplenomegaly, no masses and bowel sounds normal  MS: no gross musculoskeletal defects noted, no edema  SKIN: no suspicious lesions or rashes  NEURO: Normal strength and tone, mentation intact and speech normal  PSYCH: mentation appears normal, affect normal/bright  LYMPH: no cervical, supraclavicular, axillary, or inguinal adenopathy         2/5/2024   Mini Cog   Clock " Draw Score 2 Normal    0 Abnormal   3 Item Recall 3 objects recalled    3 objects recalled   Mini Cog Total Score 5    3        Signed Electronically by:       Helga Ibarra MD    Answers submitted by the patient for this visit:  Patient Health Questionnaire (Submitted on 2/4/2024)  PHQ9 TOTAL SCORE: 0  TIFFANY-7 (Submitted on 2/4/2024)  TIFFANY 7 TOTAL SCORE: 2

## 2024-02-06 NOTE — PROGRESS NOTES
[facility]  February 7, 2024      Behavioral Health Clinician Progress Note    Patient Name: Jolynn Haji           Service Type:  Individual      Service Location:   MyChart / Email (patient reached)     Session Start Time: 10:30 am   Session End Time: 11:24 am      Session Length: 53 - 60      Attendees: Patient     Service Modality:  Video Visit:      Provider verified identity through the following two step process.  Patient provided:  Patient is known previously to provider    Telemedicine Visit: The patient's condition can be safely assessed and treated via synchronous audio and visual telemedicine encounter.      Reason for Telemedicine Visit: Patient has requested telehealth visit    Originating Site (Patient Location): Patient's home    Distant Site (Provider Location): [facility]    Consent:  The patient/guardian has verbally consented to: the potential risks and benefits of telemedicine (video visit) versus in person care; bill my insurance or make self-payment for services provided; and responsibility for payment of non-covered services.     Patient would like the video invitation sent by:  My Chart    Mode of Communication:  Video Conference via Amwell    As the provider I attest to compliance with applicable laws and regulations related to telemedicine.    Visit Activities (Refresh list every visit): [facility] Only    Diagnostic Assessment Date: 9/27/22  Treatment Plan Review Date: 2/20/24  See Flowsheets for today's PHQ-9 and TIFFANY-7 results  Previous PHQ-9:       12/6/2023     4:54 PM 1/17/2024     4:27 PM 2/4/2024     5:22 PM   PHQ-9 SCORE   PHQ-9 Total Score MyChart 0 1 (Minimal depression) 0   PHQ-9 Total Score 0 1 0     Previous TIFFANY-7:       12/4/2023     7:25 PM 12/6/2023     4:55 PM 2/4/2024     5:23 PM   TIFFANY-7 SCORE   Total Score 1 (minimal anxiety) 1 (minimal anxiety) 2 (minimal anxiety)   Total Score 1 1 2        KARINA LEVEL:      8/27/2010     3:00 PM 2/22/2011    11:00 AM   KARINA Score (Last Two)   KARINA Raw Score 51 49   Activation Score 91.6 82.8   KARINA Level 4 4       DATA  Extended Session (60+ minutes): No  Interactive Complexity: No  Crisis: No  [facility] Patient: No    Treatment Objective(s) Addressed in This Session:  Target Behavior(s):  anxiety and depression    Depressed Mood: Increase interest, engagement, and pleasure in doing things  Decrease frequency and intensity of feeling down, depressed, hopeless  Improve quantity and quality of night time sleep / decrease daytime naps  Feel less tired and more energy during the day   Improve diet, appetite, mindful eating, and / or meal planning  Identify negative self-talk and behaviors: challenge core beliefs, myths, and actions  Improve concentration, focus, and mindfulness in daily activities   Feel less fidgety, restless or slow in daily activities / interpersonal interactions  Anxiety: will experience a reduction in anxiety, will develop more effective coping skills to manage anxiety symptoms, will develop healthy cognitive patterns and beliefs and will increase ability to function adaptively    Current Stressors / Issues:  Planning a trip to [place] with dtr and her family. Reports doing well with decrease in medication. Reported minimal headaches as a result. Feels anxiety was higher at the initial decrease of med but feeling regulated presently. Discussed normalizing anxiety as part of life. Reports MRI went well for .     1/17/24  Reports decrease in headaches with a decrease in medications. Anxiety appears to be stable. Reports  is having MRI to check lesions on pancreas. Some anxiety around this happening. Discussed taking things as they come and life in the day. Discussed desire to take vacation this winter but not planned at this time.      1/3/24  Reports having a wonderful [place] and New Years with family. Reports continued headaches with not  knowing the cause. Anxiety has been pretty good but has noted some increased heart beats. She does not know if the racing heart is always associated with anxiety. Will be talking with psychiatry about possible med change. Pt is fearful of worsening her mental health to improve the headaches. Discussed being open with psychiatry on desires and wants when it comes to medications, even if it means just asking for direction with changes. Pt has a hard time expressing self at times with providers, tends to be more agreeable in appts.       Progress on Treatment Objective(s) / Homework:  Satisfactory progress - ACTION (Actively working towards change); Intervened by reinforcing change plan / affirming steps taken    Motivational Interviewing    MI Intervention: Expressed Empathy/Understanding, Supported Autonomy, Collaboration, Evocation, Open-ended questions, Change talk (evoked) and Reframe     Change Talk Expressed by the Patient: Desire to change Ability to change Reasons to change Need to change    Provider Response to Change Talk: E - Evoked more info from patient about behavior change, A - Affirmed patient's thoughts, decisions, or attempts at behavior change, R - Reflected patient's change talk and S - Summarized patient's change talk statements      Care Plan review completed: Yes    Medication Review:  No changes to current psychiatric medication(s)    Medication Compliance:  Yes    Changes in Health Issues:   None reported    Chemical Use Review:   Substance Use: Chemical use reviewed, no active concerns identified      Tobacco Use: No current tobacco use.      Assessment: Current Emotional / Mental Status (status of significant symptoms):  Risk status (Self / Other harm or suicidal ideation)  Patient denies a history of suicidal ideation, suicide attempts, self-injurious behavior, homicidal ideation, homicidal behavior and and other safety concerns  Patient denies current fears or concerns for personal  safety.  Patient denies current or recent suicidal ideation or behaviors.  Patient denies current or recent homicidal ideation or behaviors.  Patient denies current or recent self injurious behavior or ideation.  Patient denies other safety concerns.  A safety and risk management plan has not been developed at this time, however patient was encouraged to call [place] [phone] should there be a change in any of these risk factors.    Appearance:   Appropriate   Eye Contact:   Good   Psychomotor Behavior: Normal   Attitude:   Cooperative   Orientation:   All  Speech   Rate / Production: Normal    Volume:  Normal   Mood:    Anxious   Affect:    Appropriate   Thought Content:  Clear   Thought Form:  Coherent  Logical   Insight:    Good     Diagnoses:  1. Generalized anxiety disorder    2. Insomnia, unspecified type              Collateral Reports Completed:  Not Applicable    Plan: (Homework, other):  Patient was given information about behavioral services and encouraged to schedule a follow up appointment with the clinic [facility] in 1 week.  She was also given information about mental health symptoms and treatment options .  CD Recommendations: No indications of CD issues.   Erika Francisco [facility]      ______________________________________________________________________    Integrated Primary Care Behavioral Health Treatment Plan    Patient's Name: Jolynn Haji  YOB: 1955    Date of Creation: 10/5/22  Date Treatment Plan Last Reviewed/Revised: 11/20/23    DSM5 Diagnoses: 300.02 (F41.1) Generalized Anxiety Disorder  Psychosocial / Contextual Factors: Individual Factors high anxiety associated with depressive sx, interfereing with ability to function as she would like.  .  PROMIS (reviewed every 90 days):     Referral / Collaboration:  Was/were discussed and patient will pursue. - individual therapy    Anticipated number of session for this episode of care: 5-8 sessions  Anticipation frequency of session:  Weekly  Anticipated Duration of each session: 38-52 minutes  Treatment plan will be reviewed in 90 days or when goals have been changed.       MeasurableTreatment Goal(s) related to diagnosis / functional impairment(s)  Goal 1: Patient will work with provider to manage symptoms    I will know I've met my goal when when less anxious and feeling down.      Objective #A (Patient Action)    Patient will   attend all sessions .  Status: Continued - Date(s): 11/20/23    Intervention(s)  Therapist will teach  educate patient on treatment options, clarify concerns, work with pt to overcome any resistance to compliance. .    Objective #B  Patient will identify 3 fears / thoughts that contribute to feeling anxious.  Status: Continued - Date(s): 11/20/23    Intervention(s)  Therapist will  educate patient on treatment options, clarify concerns, work with pt to overcome any resistance to compliance. .    Objective #C  Patient will identify 3 initial signs or symptoms of anxiety.  Status: Continued - Date(s): 11/20/23    Intervention(s)  Therapist will  educate patient on treatment options, clarify concerns, work with pt to overcome any resistance to compliance. .        Patient has reviewed and agreed to the above plan.      DINESH Whipple  February 7, 2024  Answers for HPI/ROS submitted by the patient on 12/6/2022  TIFFANY 7 TOTAL SCORE: 3  Answers for HPI/ROS submitted by the patient on 3/8/2023  If you checked off any problems, how difficult have these problems made it for you to do your work, take care of things at home, or get along with other people?: Not difficult at all  PHQ9 TOTAL SCORE: 5  Answers for HPI/ROS submitted by the patient on 4/26/2023  If you checked off any problems, how difficult have these problems made it for you to do your work, take care of things at home, or get along with other people?: Somewhat difficult  PHQ9 TOTAL SCORE: 1  Answers for HPI/ROS submitted by the patient on 6/12/2023  If you  checked off any problems, how difficult have these problems made it for you to do your work, take care of things at home, or get along with other people?: Not difficult at all  PHQ9 TOTAL SCORE: 1  Answers for HPI/ROS submitted by the patient on 7/18/2023  If you checked off any problems, how difficult have these problems made it for you to do your work, take care of things at home, or get along with other people?: Not difficult at all  PHQ9 TOTAL SCORE: 1Answers submitted by the patient for this visit:  Patient Health Questionnaire (Submitted on 8/27/2023)  If you checked off any problems, how difficult have these problems made it for you to do your work, take care of things at home, or get along with other people?: Not difficult at all  PHQ9 TOTAL SCORE: 1  Answers submitted by the patient for this visit:  Patient Health Questionnaire (Submitted on 10/11/2023)  PHQ9 TOTAL SCORE: 0    Answers submitted by the patient for this visit:  Patient Health Questionnaire (Submitted on 11/20/2023)  If you checked off any problems, how difficult have these problems made it for you to do your work, take care of things at home, or get along with other people?: Not difficult at all  PHQ9 TOTAL SCORE: 0    Answers submitted by the patient for this visit:  Patient Health Questionnaire (Submitted on 1/17/2024)  If you checked off any problems, how difficult have these problems made it for you to do your work, take care of things at home, or get along with other people?: Not difficult at all  PHQ9 TOTAL SCORE: 1

## 2024-02-07 ENCOUNTER — VIRTUAL VISIT (OUTPATIENT)
Dept: BEHAVIORAL HEALTH | Facility: CLINIC | Age: 69
End: 2024-02-07
Payer: COMMERCIAL

## 2024-02-07 DIAGNOSIS — F41.1 GENERALIZED ANXIETY DISORDER: Primary | ICD-10-CM

## 2024-02-07 DIAGNOSIS — G47.00 INSOMNIA, UNSPECIFIED TYPE: ICD-10-CM

## 2024-02-07 LAB
DEPRECATED CALCIDIOL+CALCIFEROL SERPL-MC: <37 UG/L (ref 20–75)
VITAMIN D2 SERPL-MCNC: <5 UG/L
VITAMIN D3 SERPL-MCNC: 32 UG/L

## 2024-02-07 PROCEDURE — 90837 PSYTX W PT 60 MINUTES: CPT | Mod: 95 | Performed by: SOCIAL WORKER

## 2024-02-20 NOTE — PROGRESS NOTES
[facility]  February 21, 2024      Behavioral Health Clinician Progress Note    Patient Name: Jolynn Haji           Service Type:  Individual      Service Location:   MyChart / Email (patient reached)     Session Start Time: 10:00 am   Session End Time: 10:54 am      Session Length: 53 - 60      Attendees: Patient     Service Modality:  Video Visit:      Provider verified identity through the following two step process.  Patient provided:  Patient is known previously to provider    Telemedicine Visit: The patient's condition can be safely assessed and treated via synchronous audio and visual telemedicine encounter.      Reason for Telemedicine Visit: Patient has requested telehealth visit    Originating Site (Patient Location): Patient's home    Distant Site (Provider Location): [facility]    Consent:  The patient/guardian has verbally consented to: the potential risks and benefits of telemedicine (video visit) versus in person care; bill my insurance or make self-payment for services provided; and responsibility for payment of non-covered services.     Patient would like the video invitation sent by:  My Chart    Mode of Communication:  Video Conference via Amwell    As the provider I attest to compliance with applicable laws and regulations related to telemedicine.    Visit Activities (Refresh list every visit): [facility] Only    Diagnostic Assessment Date: 9/27/22  Treatment Plan Review Date: 2/20/24  See Flowsheets for today's PHQ-9 and TIFFANY-7 results  Previous PHQ-9:       12/6/2023     4:54 PM 1/17/2024     4:27 PM 2/4/2024     5:22 PM   PHQ-9 SCORE   PHQ-9 Total Score MyChart 0 1 (Minimal depression) 0   PHQ-9 Total Score 0 1 0     Previous TIFFANY-7:       12/4/2023     7:25 PM 12/6/2023     4:55 PM 2/4/2024     5:23 PM   TIFFANY-7 SCORE   Total Score 1 (minimal anxiety) 1 (minimal anxiety) 2 (minimal anxiety)   Total Score 1 1 2        KARINA LEVEL:      8/27/2010     3:00 PM 2/22/2011    11:00 AM   KARINA Score (Last Two)   KARINA Raw Score 51 49   Activation Score 91.6 82.8   KARINA Level 4 4       DATA  Extended Session (60+ minutes): No  Interactive Complexity: No  Crisis: No  [facility] Patient: No    Treatment Objective(s) Addressed in This Session:  Target Behavior(s):  anxiety and depression    Depressed Mood: Increase interest, engagement, and pleasure in doing things  Decrease frequency and intensity of feeling down, depressed, hopeless  Improve quantity and quality of night time sleep / decrease daytime naps  Feel less tired and more energy during the day   Improve diet, appetite, mindful eating, and / or meal planning  Identify negative self-talk and behaviors: challenge core beliefs, myths, and actions  Improve concentration, focus, and mindfulness in daily activities   Feel less fidgety, restless or slow in daily activities / interpersonal interactions  Anxiety: will experience a reduction in anxiety, will develop more effective coping skills to manage anxiety symptoms, will develop healthy cognitive patterns and beliefs and will increase ability to function adaptively    Current Stressors / Issues:  Reports doing remodeling at the cabin. Trip for [place] has been scheduled for end of March. Reports some anxiety every day and discussed normalizing anxiety. Has been having increased headaches this past week. Denies any increase in stress or anxiety this past week. Discussed continued use of anxiety skills. Discussed journaling becoming a bit stagnant and discussed using prompts.       2/7/24  Planning a trip to [place] with dtr and her family. Reports doing well with decrease in medication. Reported minimal headaches as a result. Feels anxiety was higher at the initial decrease of med but feeling regulated presently. Discussed normalizing anxiety as part of life. Reports MRI went well for .     1/17/24  Reports decrease in headaches with a decrease  in medications. Anxiety appears to be stable. Reports  is having MRI to check lesions on pancreas. Some anxiety around this happening. Discussed taking things as they come and life in the day. Discussed desire to take vacation this winter but not planned at this time.      1/3/24  Reports having a wonderful [place] and New Years with family. Reports continued headaches with not knowing the cause. Anxiety has been pretty good but has noted some increased heart beats. She does not know if the racing heart is always associated with anxiety. Will be talking with psychiatry about possible med change. Pt is fearful of worsening her mental health to improve the headaches. Discussed being open with psychiatry on desires and wants when it comes to medications, even if it means just asking for direction with changes. Pt has a hard time expressing self at times with providers, tends to be more agreeable in appts.       Progress on Treatment Objective(s) / Homework:  Satisfactory progress - ACTION (Actively working towards change); Intervened by reinforcing change plan / affirming steps taken    Motivational Interviewing    MI Intervention: Expressed Empathy/Understanding, Supported Autonomy, Collaboration, Evocation, Open-ended questions, Change talk (evoked) and Reframe     Change Talk Expressed by the Patient: Desire to change Ability to change Reasons to change Need to change    Provider Response to Change Talk: E - Evoked more info from patient about behavior change, A - Affirmed patient's thoughts, decisions, or attempts at behavior change, R - Reflected patient's change talk and S - Summarized patient's change talk statements      Care Plan review completed: Yes    Medication Review:  No changes to current psychiatric medication(s)    Medication Compliance:  Yes    Changes in Health Issues:   None reported    Chemical Use Review:   Substance Use: Chemical use reviewed, no active concerns identified      Tobacco  Use: No current tobacco use.      Assessment: Current Emotional / Mental Status (status of significant symptoms):  Risk status (Self / Other harm or suicidal ideation)  Patient denies a history of suicidal ideation, suicide attempts, self-injurious behavior, homicidal ideation, homicidal behavior and and other safety concerns  Patient denies current fears or concerns for personal safety.  Patient denies current or recent suicidal ideation or behaviors.  Patient denies current or recent homicidal ideation or behaviors.  Patient denies current or recent self injurious behavior or ideation.  Patient denies other safety concerns.  A safety and risk management plan has not been developed at this time, however patient was encouraged to call [place] [phone] should there be a change in any of these risk factors.    Appearance:   Appropriate   Eye Contact:   Good   Psychomotor Behavior: Normal   Attitude:   Cooperative   Orientation:   All  Speech   Rate / Production: Normal    Volume:  Normal   Mood:    Anxious   Affect:    Appropriate   Thought Content:  Clear   Thought Form:  Coherent  Logical   Insight:    Good     Diagnoses:  1. Generalized anxiety disorder    2. Insomnia, unspecified type              Collateral Reports Completed:  Not Applicable    Plan: (Homework, other):  Patient was given information about behavioral services and encouraged to schedule a follow up appointment with the clinic [facility] in 1 week.  She was also given information about mental health symptoms and treatment options .  CD Recommendations: No indications of CD issues.   Erika Francisco [facility]      ______________________________________________________________________    Integrated Primary Care Behavioral Health Treatment Plan    Patient's Name: Jolynn Haji  YOB: 1955    Date of Creation: 10/5/22  Date Treatment Plan Last Reviewed/Revised: 2/21/24    DSM5 Diagnoses: 300.02 (F41.1) Generalized Anxiety Disorder  Psychosocial /  Contextual Factors: Individual Factors high anxiety associated with depressive sx, interfereing with ability to function as she would like.  .  PROMIS (reviewed every 90 days):     Referral / Collaboration:  Was/were discussed and patient will pursue. - individual therapy    Anticipated number of session for this episode of care: 5-8 sessions  Anticipation frequency of session: Weekly  Anticipated Duration of each session: 38-52 minutes  Treatment plan will be reviewed in 90 days or when goals have been changed.       MeasurableTreatment Goal(s) related to diagnosis / functional impairment(s)  Goal 1: Patient will work with provider to manage symptoms    I will know I've met my goal when when less anxious and feeling down.      Objective #A (Patient Action)    Patient will   attend all sessions .  Status: Continued - Date(s): 2/21/24    Intervention(s)  Therapist will teach  educate patient on treatment options, clarify concerns, work with pt to overcome any resistance to compliance. .    Objective #B  Patient will identify 3 fears / thoughts that contribute to feeling anxious.  Status: Continued - Date(s): 2/21/24    Intervention(s)  Therapist will  educate patient on treatment options, clarify concerns, work with pt to overcome any resistance to compliance. .    Objective #C  Patient will identify 3 initial signs or symptoms of anxiety.  Status: Continued - Date(s): 2/21/24    Intervention(s)  Therapist will  educate patient on treatment options, clarify concerns, work with pt to overcome any resistance to compliance. .        Patient has reviewed and agreed to the above plan.      Erika Francisco [facility]  February 21, 2024  Answers for HPI/ROS submitted by the patient on 12/6/2022  TIFFANY 7 TOTAL SCORE: 3  Answers for HPI/ROS submitted by the patient on 3/8/2023  If you checked off any problems, how difficult have these problems made it for you to do your work, take care of things at home, or get along with other  people?: Not difficult at all  PHQ9 TOTAL SCORE: 5  Answers for HPI/ROS submitted by the patient on 4/26/2023  If you checked off any problems, how difficult have these problems made it for you to do your work, take care of things at home, or get along with other people?: Somewhat difficult  PHQ9 TOTAL SCORE: 1  Answers for HPI/ROS submitted by the patient on 6/12/2023  If you checked off any problems, how difficult have these problems made it for you to do your work, take care of things at home, or get along with other people?: Not difficult at all  PHQ9 TOTAL SCORE: 1  Answers for HPI/ROS submitted by the patient on 7/18/2023  If you checked off any problems, how difficult have these problems made it for you to do your work, take care of things at home, or get along with other people?: Not difficult at all  PHQ9 TOTAL SCORE: 1Answers submitted by the patient for this visit:  Patient Health Questionnaire (Submitted on 8/27/2023)  If you checked off any problems, how difficult have these problems made it for you to do your work, take care of things at home, or get along with other people?: Not difficult at all  PHQ9 TOTAL SCORE: 1  Answers submitted by the patient for this visit:  Patient Health Questionnaire (Submitted on 10/11/2023)  PHQ9 TOTAL SCORE: 0    Answers submitted by the patient for this visit:  Patient Health Questionnaire (Submitted on 11/20/2023)  If you checked off any problems, how difficult have these problems made it for you to do your work, take care of things at home, or get along with other people?: Not difficult at all  PHQ9 TOTAL SCORE: 0    Answers submitted by the patient for this visit:  Patient Health Questionnaire (Submitted on 1/17/2024)  If you checked off any problems, how difficult have these problems made it for you to do your work, take care of things at home, or get along with other people?: Not difficult at all  PHQ9 TOTAL SCORE: 1

## 2024-02-21 ENCOUNTER — VIRTUAL VISIT (OUTPATIENT)
Dept: BEHAVIORAL HEALTH | Facility: CLINIC | Age: 69
End: 2024-02-21
Payer: COMMERCIAL

## 2024-02-21 DIAGNOSIS — F41.1 GENERALIZED ANXIETY DISORDER: Primary | ICD-10-CM

## 2024-02-21 DIAGNOSIS — G47.00 INSOMNIA, UNSPECIFIED TYPE: ICD-10-CM

## 2024-02-21 PROCEDURE — 90837 PSYTX W PT 60 MINUTES: CPT | Mod: 95 | Performed by: SOCIAL WORKER

## 2024-02-22 ENCOUNTER — OFFICE VISIT (OUTPATIENT)
Dept: DERMATOLOGY | Facility: CLINIC | Age: 69
End: 2024-02-22
Payer: COMMERCIAL

## 2024-02-22 DIAGNOSIS — L82.1 SEBORRHEIC KERATOSES: ICD-10-CM

## 2024-02-22 DIAGNOSIS — L81.4 LENTIGO: ICD-10-CM

## 2024-02-22 DIAGNOSIS — D18.01 ANGIOMA OF SKIN: ICD-10-CM

## 2024-02-22 DIAGNOSIS — Z85.828 HISTORY OF SKIN CANCER: ICD-10-CM

## 2024-02-22 DIAGNOSIS — D23.9 DERMAL NEVUS: Primary | ICD-10-CM

## 2024-02-22 DIAGNOSIS — L82.0 INFLAMED SEBORRHEIC KERATOSIS: ICD-10-CM

## 2024-02-22 PROCEDURE — 17110 DESTRUCTION B9 LES UP TO 14: CPT | Performed by: DERMATOLOGY

## 2024-02-22 PROCEDURE — 99213 OFFICE O/P EST LOW 20 MIN: CPT | Mod: 25 | Performed by: DERMATOLOGY

## 2024-02-22 PROCEDURE — 93244 EXT ECG>48HR<7D REV&INTERPJ: CPT | Performed by: INTERNAL MEDICINE

## 2024-02-22 NOTE — LETTER
2024         RE: Jolynn Haji  [id number] [place] [address]        Dear Colleague,    Thank you for referring your patient, Jolynn Haji, to the [facility]. Please see a copy of my visit note below.    Jolynn Haji is an extremely pleasant 68 year old year old female patient here today for hx of non-melanoma skin cancer.  Patient has no other skin complaints today.  Remainder of the HPI, Meds, PMH, Allergies, FH, and SH was reviewed in chart.      Past Medical History:   Diagnosis Date     Abnormal glandular Papanicolaou smear of cervix- ASCUS in 2003    ASC H-      Basal cell carcinoma of leg, right 2016    Dr Rogers     Colon polyps 2018    tubular adenoam - due 5 yrs - 2 mm     Concussion without loss of consciousness 2017     Hidradenitis     left  groin     HSIL (high grade squamous intraepithelial lesion) on Pap smear of cervix 2000    Normal paps from  to /     Hypertension goal BP (blood pressure) < 140/90      OA (osteoarthritis) of knee     moderate     Postmenopausal since 2008      Shingles 2010    left      Squamous cell carcinoma of skin, unspecified      Synovial cyst of popliteal space        Past Surgical History:   Procedure Laterality Date     COLONOSCOPY  2018    polyps, tubular - 2 mm - due 5 yrs     COLONOSCOPY N/A 2023    Procedure: Colonoscopy;  Surgeon: Yogesh Cedeño MD;  Location: [facility] NONSPECIFIC PROCEDURE      Colposcopy        Family History   Problem Relation Age of Onset     Thyroid Disease Mother         Graves disease     Hypertension Mother      Diabetes Mother         type 2     Blood Disease Mother          from leukemia at age 78     Heart Disease Father 62        MV problem with CHF  at 62/ from MI     Hypertension Father      Diabetes Son         Juvenile Diabetes     Diabetes Daughter         Juvenile Diabetes     Cancer Maternal  Uncle         type ??     Cancer Maternal Uncle         liver     Colon Cancer No family hx of        Social History     Socioeconomic History     Marital status:      Spouse name: Zach     Number of children: 2     Years of education: 16     Highest education level: Not on file   Occupational History     Occupation: teacher first grade -retired at age 60     Comment: [id number]     Employer: [employer]   Tobacco Use     Smoking status: Never     Smokeless tobacco: Never   Vaping Use     Vaping Use: Never used   Substance and Sexual Activity     Alcohol use: Yes     Alcohol/week: 0.0 - 1.0 standard drinks of alcohol     Comment: 2 per month     Drug use: No     Comment: no herbal meds either     Sexual activity: Yes     Partners: Male     Birth control/protection: Surgical     Comment: -was on DepoProvera since 2000-2/2008 -  had vasectomy   Other Topics Concern      Service No     Blood Transfusions No     Caffeine Concern No     Occupational Exposure No     Hobby Hazards No     Sleep Concern No     Stress Concern No     Weight Concern No     Special Diet No     Back Care No     Exercise Yes     Comment: regular      Bike Helmet No     Seat Belt Yes     Comment: always     Self-Exams Yes     Comment: SBE  encouraged monthly     Parent/sibling w/ CABG, MI or angioplasty before 65F 55M? No   Social History Narrative    [place] - [place] Ramon Bryson - going to be 6 yrs old spring 2021---needs walks every day. --Helga Ibarra MD      January 13, 2021      Social Determinants of Health     Financial Resource Strain: Low Risk  (2/4/2024)    Financial Resource Strain      Within the past 12 months, have you or your family members you live with been unable to get utilities (heat, electricity) when it was really needed?: No   Food Insecurity: Low Risk  (2/4/2024)    Food Insecurity      Within the past 12 months, did you worry that your food would run  out before you got money to buy more?: No      Within the past 12 months, did the food you bought just not last and you didn t have money to get more?: No   Transportation Needs: Low Risk  (2/4/2024)    Transportation Needs      Within the past 12 months, has lack of transportation kept you from medical appointments, getting your medicines, non-medical meetings or appointments, work, or from getting things that you need?: No   Physical Activity: Sufficiently Active (2/4/2024)    Exercise Vital Sign      Days of Exercise per Week: 4 days      Minutes of Exercise per Session: 40 min   Stress: No Stress Concern Present (2/4/2024)    [employer] [employer] - Occupational Stress Questionnaire      Feeling of Stress : Not at all   Social Connections: Unknown (2/4/2024)    Social Connection and Isolation Panel [NHANES]      Frequency of Communication with Friends and Family: Not on file      Frequency of Social Gatherings with Friends and Family: Three times a week      Attends Adventist Services: Not on file      Active Member of Clubs or Organizations: Not on file      Attends Club or Organization Meetings: Not on file      Marital Status: Not on file   Interpersonal Safety: Low Risk  (2/5/2024)    Interpersonal Safety      Do you feel physically and emotionally safe where you currently live?: Yes      Within the past 12 months, have you been hit, slapped, kicked or otherwise physically hurt by someone?: No      Within the past 12 months, have you been humiliated or emotionally abused in other ways by your partner or ex-partner?: No   Housing Stability: Low Risk  (2/4/2024)    Housing Stability      Do you have housing? : Yes      Are you worried about losing your housing?: No       Outpatient Encounter Medications as of 2/22/2024   Medication Sig Dispense Refill     clindamycin (CLEOCIN T) 1 % external lotion Apply topically 2 times daily 60 mL 3     lactobacillus rhamnosus, GG, (CULTURELL) capsule  Take 1 capsule by mouth daily Taking every other day       lisinopril (ZESTRIL) 5 MG tablet Take 1 tablet (5 mg) by mouth daily 90 tablet 3     propranolol ER (INDERAL LA) 60 MG 24 hr capsule Take 1 capsule (60 mg) by mouth daily 90 capsule 3     traZODone (DESYREL) 50 MG tablet Take 0.5-1 tablets (25-50 mg) by mouth nightly as needed for sleep 90 tablet 3     venlafaxine (EFFEXOR XR) 75 MG 24 hr capsule Take 1 capsule (75 mg) by mouth daily 90 capsule 1     Facility-Administered Encounter Medications as of 2/22/2024   Medication Dose Route Frequency Provider Last Rate Last Admin     lidocaine 1 % 4 mL  4 mL INTRA-ARTICULAR Once Helga Ibarra MD         lidocaine 1 % injection 3 mL  3 mL   Giovany Quiroz MD   3 mL at 07/01/22 1120     lidocaine 1 % injection 4 mL  4 mL   Giovany Quiroz MD   4 mL at 07/01/22 1120     triamcinolone (KENALOG-40) injection 40 mg  40 mg INTRA-ARTICULAR Once Helga Ibarra MD                 O:   NAD, WDWN, Alert & Oriented, Mood & Affect wnl, Vitals stable   General appearance normal   Vitals stable   Alert, oriented and in no acute distress     L thigh and L upper back inflamed seborrheic keratosis    Stuck on papules and brown macules on trunk and ext   Red papules on trunk  Flesh colored papules on trunk     The remainder of the full exam was normal; the following areas were examined:  conjunctiva/lids, , neck, peripheral vascular system, abdomen, lymph nodes, digits/nails, eccrine and apocrine glands, scalp/hair, face, neck, chest, abdomen, buttocks, back, RUE, LUE, RLE, LLE       Eyes: Conjunctivae/lids:Normal     ENT: Lips, buccal mucosa, tongue: normal    MSK:Normal    Cardiovascular: peripheral edema none    Pulm: Breathing Normal    Lymph Nodes: No Head and Neck Lymphadenopathy     Neuro/Psych: Orientation:Alert and Orientedx3 ; Mood/Affect:normal       A/P:  1. Seborrheic keratosis, lentigo, angioma, dermal nevus, hx of non-melanoma skin cancer   2.  Inflamed seborrheic keratosis   L thigh and back   LN2:  Treated with LN2 for 5s for 1-2 cycles. Warned risks of blistering, pain, pigment change, scarring, and incomplete resolution.  Advised patient to return if lesions do not completely resolve.  Wound care sheet given.  It was a pleasure speaking to Jolynn Haji today.  Previous clinic notes and pertinent laboratory tests were reviewed prior to Jolynn Haji's visit.  Nature and genetics of benign skin lesions dicussed with patient.  Signs and Symptoms of skin cancer discussed with patient.  Patient encouraged to perform monthly skin exams.  UV precautions reviewed with patient.  Risks of non-melanoma skin cancer discussed with patient   Return to clinic 12 months      Again, thank you for allowing me to participate in the care of your patient.        Sincerely,        Sheldon Rogers MD

## 2024-02-22 NOTE — PROGRESS NOTES
Jolynn Haji is an extremely pleasant 68 year old year old female patient here today for hx of non-melanoma skin cancer.  Patient has no other skin complaints today.  Remainder of the HPI, Meds, PMH, Allergies, FH, and SH was reviewed in chart.      Past Medical History:   Diagnosis Date    Abnormal glandular Papanicolaou smear of cervix- ASCUS in 2003    ASC H- 2000    Basal cell carcinoma of leg, right 2016    Dr Rogers    Colon polyps 2018    tubular adenoam - due 5 yrs - 2 mm    Concussion without loss of consciousness 2017    Hidradenitis     left  groin    HSIL (high grade squamous intraepithelial lesion) on Pap smear of cervix 2000    Normal paps from  to /    Hypertension goal BP (blood pressure) < 140/90     OA (osteoarthritis) of knee     moderate    Postmenopausal since 2008     Shingles 2010    left     Squamous cell carcinoma of skin, unspecified     Synovial cyst of popliteal space        Past Surgical History:   Procedure Laterality Date    COLONOSCOPY  2018    polyps, tubular - 2 mm - due 5 yrs    COLONOSCOPY N/A 2023    Procedure: Colonoscopy;  Surgeon: Yogesh Cedeño MD;  Location:  GI    [facility] NONSPECIFIC PROCEDURE      Colposcopy        Family History   Problem Relation Age of Onset    Thyroid Disease Mother         Graves disease    Hypertension Mother     Diabetes Mother         type 2    Blood Disease Mother          from leukemia at age 78    Heart Disease Father 62        MV problem with CHF  at 62/ from MI    Hypertension Father     Diabetes Son         Juvenile Diabetes    Diabetes Daughter         Juvenile Diabetes    Cancer Maternal Uncle         type ??    Cancer Maternal Uncle         liver    Colon Cancer No family hx of        Social History     Socioeconomic History    Marital status:      Spouse name: Zach    Number of children: 2    Years of education: 16    Highest education level: Not on file    Occupational History    Occupation: teacher first grade -retired at age 60     Comment: [phone]     Employer: [employer]   Tobacco Use    Smoking status: Never    Smokeless tobacco: Never   Vaping Use    Vaping Use: Never used   Substance and Sexual Activity    Alcohol use: Yes     Alcohol/week: 0.0 - 1.0 standard drinks of alcohol     Comment: 2 per month    Drug use: No     Comment: no herbal meds either    Sexual activity: Yes     Partners: Male     Birth control/protection: Surgical     Comment: -was on DepoProvera since 2000-2/2008 -  had vasectomy   Other Topics Concern     Service No    Blood Transfusions No    Caffeine Concern No    Occupational Exposure No    Hobby Hazards No    Sleep Concern No    Stress Concern No    Weight Concern No    Special Diet No    Back Care No    Exercise Yes     Comment: regular     Bike Helmet No    Seat Belt Yes     Comment: always    Self-Exams Yes     Comment: SBE  encouraged monthly    Parent/sibling w/ CABG, MI or angioplasty before 65F 55M? No   Social History Narrative    [place] - [place] Ramon Bryson - going to be 6 yrs old spring 2021---needs walks every day. --Helga Ibarra MD      January 13, 2021      Social Determinants of Health     Financial Resource Strain: Low Risk  (2/4/2024)    Financial Resource Strain     Within the past 12 months, have you or your family members you live with been unable to get utilities (heat, electricity) when it was really needed?: No   Food Insecurity: Low Risk  (2/4/2024)    Food Insecurity     Within the past 12 months, did you worry that your food would run out before you got money to buy more?: No     Within the past 12 months, did the food you bought just not last and you didn t have money to get more?: No   Transportation Needs: Low Risk  (2/4/2024)    Transportation Needs     Within the past 12 months, has lack of transportation kept you from medical  appointments, getting your medicines, non-medical meetings or appointments, work, or from getting things that you need?: No   Physical Activity: Sufficiently Active (2/4/2024)    Exercise Vital Sign     Days of Exercise per Week: 4 days     Minutes of Exercise per Session: 40 min   Stress: No Stress Concern Present (2/4/2024)    [place] [employer] - Occupational Stress Questionnaire     Feeling of Stress : Not at all   Social Connections: Unknown (2/4/2024)    Social Connection and Isolation Panel [NHANES]     Frequency of Communication with Friends and Family: Not on file     Frequency of Social Gatherings with Friends and Family: Three times a week     Attends Rastafari Services: Not on file     Active Member of Clubs or Organizations: Not on file     Attends Club or Organization Meetings: Not on file     Marital Status: Not on file   Interpersonal Safety: Low Risk  (2/5/2024)    Interpersonal Safety     Do you feel physically and emotionally safe where you currently live?: Yes     Within the past 12 months, have you been hit, slapped, kicked or otherwise physically hurt by someone?: No     Within the past 12 months, have you been humiliated or emotionally abused in other ways by your partner or ex-partner?: No   Housing Stability: Low Risk  (2/4/2024)    Housing Stability     Do you have housing? : Yes     Are you worried about losing your housing?: No       Outpatient Encounter Medications as of 2/22/2024   Medication Sig Dispense Refill    clindamycin (CLEOCIN T) 1 % external lotion Apply topically 2 times daily 60 mL 3    lactobacillus rhamnosus, GG, (CULTURELL) capsule Take 1 capsule by mouth daily Taking every other day      lisinopril (ZESTRIL) 5 MG tablet Take 1 tablet (5 mg) by mouth daily 90 tablet 3    propranolol ER (INDERAL LA) 60 MG 24 hr capsule Take 1 capsule (60 mg) by mouth daily 90 capsule 3    traZODone (DESYREL) 50 MG tablet Take 0.5-1 tablets (25-50 mg) by mouth nightly  as needed for sleep 90 tablet 3    venlafaxine (EFFEXOR XR) 75 MG 24 hr capsule Take 1 capsule (75 mg) by mouth daily 90 capsule 1     Facility-Administered Encounter Medications as of 2/22/2024   Medication Dose Route Frequency Provider Last Rate Last Admin    lidocaine 1 % 4 mL  4 mL INTRA-ARTICULAR Once Helga Ibarra MD        lidocaine 1 % injection 3 mL  3 mL   Giovany Quiroz MD   3 mL at 07/01/22 1120    lidocaine 1 % injection 4 mL  4 mL   Giovany Quiroz MD   4 mL at 07/01/22 1120    triamcinolone (KENALOG-40) injection 40 mg  40 mg INTRA-ARTICULAR Once Helga Ibarra MD                 O:   NAD, WDWN, Alert & Oriented, Mood & Affect wnl, Vitals stable   General appearance normal   Vitals stable   Alert, oriented and in no acute distress     L thigh and L upper back inflamed seborrheic keratosis    Stuck on papules and brown macules on trunk and ext   Red papules on trunk  Flesh colored papules on trunk     The remainder of the full exam was normal; the following areas were examined:  conjunctiva/lids, , neck, peripheral vascular system, abdomen, lymph nodes, digits/nails, eccrine and apocrine glands, scalp/hair, face, neck, chest, abdomen, buttocks, back, RUE, LUE, RLE, LLE       Eyes: Conjunctivae/lids:Normal     ENT: Lips, buccal mucosa, tongue: normal    MSK:Normal    Cardiovascular: peripheral edema none    Pulm: Breathing Normal    Lymph Nodes: No Head and Neck Lymphadenopathy     Neuro/Psych: Orientation:Alert and Orientedx3 ; Mood/Affect:normal       A/P:  1. Seborrheic keratosis, lentigo, angioma, dermal nevus, hx of non-melanoma skin cancer   2. Inflamed seborrheic keratosis   L thigh and back   LN2:  Treated with LN2 for 5s for 1-2 cycles. Warned risks of blistering, pain, pigment change, scarring, and incomplete resolution.  Advised patient to return if lesions do not completely resolve.  Wound care sheet given.  It was a pleasure speaking to Jolynn Haji today.  Previous  clinic notes and pertinent laboratory tests were reviewed prior to Jolynn Haji's visit.  Nature and genetics of benign skin lesions dicussed with patient.  Signs and Symptoms of skin cancer discussed with patient.  Patient encouraged to perform monthly skin exams.  UV precautions reviewed with patient.  Risks of non-melanoma skin cancer discussed with patient   Return to clinic 12 months

## 2024-02-25 ASSESSMENT — ANXIETY QUESTIONNAIRES
8. IF YOU CHECKED OFF ANY PROBLEMS, HOW DIFFICULT HAVE THESE MADE IT FOR YOU TO DO YOUR WORK, TAKE CARE OF THINGS AT HOME, OR GET ALONG WITH OTHER PEOPLE?: NOT DIFFICULT AT ALL
4. TROUBLE RELAXING: NOT AT ALL
3. WORRYING TOO MUCH ABOUT DIFFERENT THINGS: NOT AT ALL
GAD7 TOTAL SCORE: 3
7. FEELING AFRAID AS IF SOMETHING AWFUL MIGHT HAPPEN: NOT AT ALL
6. BECOMING EASILY ANNOYED OR IRRITABLE: NOT AT ALL
GAD7 TOTAL SCORE: 3
1. FEELING NERVOUS, ANXIOUS, OR ON EDGE: NEARLY EVERY DAY
5. BEING SO RESTLESS THAT IT IS HARD TO SIT STILL: NOT AT ALL
7. FEELING AFRAID AS IF SOMETHING AWFUL MIGHT HAPPEN: NOT AT ALL
2. NOT BEING ABLE TO STOP OR CONTROL WORRYING: NOT AT ALL
GAD7 TOTAL SCORE: 3
IF YOU CHECKED OFF ANY PROBLEMS ON THIS QUESTIONNAIRE, HOW DIFFICULT HAVE THESE PROBLEMS MADE IT FOR YOU TO DO YOUR WORK, TAKE CARE OF THINGS AT HOME, OR GET ALONG WITH OTHER PEOPLE: NOT DIFFICULT AT ALL

## 2024-02-25 ASSESSMENT — PATIENT HEALTH QUESTIONNAIRE - PHQ9
SUM OF ALL RESPONSES TO PHQ QUESTIONS 1-9: 0
SUM OF ALL RESPONSES TO PHQ QUESTIONS 1-9: 0

## 2024-02-26 NOTE — PROGRESS NOTES
"    [facility] - [place]      PATIENT'S NAME: Jolynn Haji  PREFERRED NAME: Azalea  PRONOUNS: she/her/hers     MRN: [id number]  : 1955  ADDRESS: [address] [place] [address]  ACCT. NUMBER:  [id number]  DATE OF SERVICE: 24  START TIME: 10AM  END TIME: 1025AM  PREFERRED PHONE: [phone]  May we leave a program related message: Yes  EMERGENCY CONTACT: was obtained Zach Haji (Spouse)   [phone] (Mobile  .  SERVICE MODALITY:  Video Visit:      Provider verified identity through the following two step process.  Patient provided:  Patient photo and Patient     Telemedicine Visit: The patient's condition can be safely assessed and treated via synchronous audio and visual telemedicine encounter.      Reason for Telemedicine Visit: Services only offered telehealth    Originating Site (Patient Location): Patient's home    Distant Site (Provider Location): Provider Remote Setting- Home Office    Consent:  The patient/guardian has verbally consented to: the potential risks and benefits of telemedicine (video visit) versus in person care; bill my insurance or make self-payment for services provided; and responsibility for payment of non-covered services.     Patient would like the video invitation sent by:  My Chart    Mode of Communication:  Video Conference via Amwell    Distant Location (Provider):  Off-site    As the provider I attest to compliance with applicable laws and regulations related to telemedicine.    UNIVERSAL ADULT Mental Health DIAGNOSTIC ASSESSMENT    Identifying Information:  Patient is a 68 year old,   individual.  Patient was referred for an assessment by current Behavioral Health Provider.  Patient attended the session alone.    Chief Complaint:   The reason for seeking services at this time is: \"Medication adjustment\".  The problem(s) began years ago    Patient has attempted to resolve these concerns in the past " through previous med mgmt and therapy .    Social/Family History:  Patient reported they grew up [place], [place]. They were raised by biological parents  . Parents were always together. Patient reported that their childhood was happy. She has a sister and brother.  Patient described their current relationships with family of origin as very close with siblings today     The patient describes their cultural background as .  Cultural influences and impact on patient's life structure, values, norms, and healthcare: English, L[place], [place]ian,    [place].  Contextual influences on patient's health include: N/a.    These factors will be addressed in the Preliminary Treatment plan. Patient identified their preferred language to be English. Patient reported they does not need the assistance of an  or other support involved in therapy.     Patient reported had no significant delays in developmental tasks.   Patient's highest education level was graduate school  .  Patient identified the following learning problems: none reported.  Modifications will not be used to assist communication in therapy.  Patient reports they are  able to understand written materials.    Patient reported the following relationship history .  Patient's current relationship status is  for 46 years.   Patient identified their sexual orientation as heterosexual.  Patient reported having 2 child(mateo) 2 grandchildren. Patient identified adult child; friends; therapist; spouse as part of their support system.  Patient identified the quality of these relationships as stable and meaningful,  .      Patient's current living/housing situation involves staying in own home/apartment.  The immediate members of family and household include Shayla Winn,  and they report that housing is stable.    Patient is currently retired.  Patient reports their finances are obtained through other. Patient does not identify finances as a  current stressor.  Retired teacher    Patient reported that they have not been involved with the legal system.    . Patient does not report being under probation/ parole/ jurisdiction. .    Patient's Strengths and Limitations:  Patient identified the following strengths or resources that will help them succeed in treatment: umm / spirituality, friends / good social support, insight, intelligence, and strong social skills. Things that may interfere with the patient's success in treatment include: none identified.     Assessments:  The following assessments were completed by patient for this visit:  PHQ2:       5/3/2023     1:18 PM 3/21/2023    11:20 AM 2/23/2023     7:15 PM 1/31/2023     3:21 PM 1/12/2023     4:28 PM 12/15/2022     9:12 PM 12/8/2022     9:38 AM   PHQ-2 ( 1999 [employer])   Q1: Little interest or pleasure in doing things   0 0 0  0   Q2: Feeling down, depressed or hopeless   0 0 0  0   PHQ-2 Score   0 0 0  0   Q1: Little interest or pleasure in doing things   Not at all Not at all Not at all  Not at all   Q2: Feeling down, depressed or hopeless   Not at all Not at all Not at all  Not at all   PHQ-2 Score Incomplete Incomplete 0 0 0 Incomplete 0     PHQ9:       9/27/2023     4:53 PM 10/11/2023    11:51 AM 11/20/2023     9:51 AM 12/6/2023     4:54 PM 1/17/2024     4:27 PM 2/4/2024     5:22 PM 2/25/2024     1:21 PM   PHQ-9 SCORE   PHQ-9 Total Score MyChart 0 0 0 0 1 (Minimal depression) 0 0   PHQ-9 Total Score 0 0 0 0 1 0 0     GAD2:       9/17/2023     7:30 PM 10/8/2023     5:51 PM 11/5/2023     3:03 PM 12/19/2023    10:17 AM 1/8/2024    11:48 AM 2/18/2024    12:57 PM 2/25/2024     2:08 PM   TIFFANY-2   Feeling nervous, anxious, or on edge 2 2 1 3 1 1 2   Not being able to stop or control worrying 0 0 0 0 0 0 0   TIFFANY-2 Total Score 2 2 1 3 1 1 2     GAD7:       7/11/2023     7:38 PM 9/25/2023     3:40 PM 9/27/2023     4:53 PM 12/4/2023     7:25 PM 12/6/2023     4:55 PM 2/4/2024     5:23 PM 2/25/2024     1:22  PM   TIFFANY-7 SCORE   Total Score 1 (minimal anxiety) 1 (minimal anxiety) 1 (minimal anxiety) 1 (minimal anxiety) 1 (minimal anxiety) 2 (minimal anxiety) 3 (minimal anxiety)   Total Score 1 1 1 1 1 2 3     CAGE-AID:       6/6/2017    12:05 PM 5/12/2022     2:26 PM   CAGE-AID Total Score   Total Score 0 0    0   Total Score MyChart  0 (A total score of 2 or greater is considered clinically significant)     PROMIS 10-Global Health (only subscores and total score):       4/18/2023    11:16 AM 4/26/2023    11:19 AM 8/1/2023     7:57 PM 10/8/2023     6:00 PM 1/8/2024    11:52 AM 1/15/2024     7:25 PM 2/25/2024     2:04 PM   PROMIS-10 Scores Only   Global Mental Health Score 15 15    15 14 17 17 17 17   Global Physical Health Score 17 15    15 17 16 19 16 17   PROMIS TOTAL - SUBSCORES 32 30    30 31 33 36 33 34     [place] Suicide Severity Rating Scale (Lifetime/Recent)      5/12/2022    10:07 PM 9/27/2022     2:16 PM 2/28/2024    10:11 AM   [place] Suicide Severity Rating (Lifetime/Recent)   Q1 Wish to be Dead (Lifetime) N Y Y   Wish to be Dead Description (Lifetime)  fleeting thought of wishing she were dead    1. Wish to be Dead (Past 1 Month)  N N   Q2 Non-Specific Active Suicidal Thoughts (Lifetime) N N N   Most Severe Ideation Rating (Lifetime)  1 1   Most Severe Ideation Rating (Past 1 Month)  1 1   Frequency (Lifetime)  1 1   Frequency (Past 1 Month)  1    Duration (Lifetime)  1 1   Duration (Past 1 Month)  1    Controllability (Lifetime)  1    Controllability (Past 1 Month)  1    Deterrents (Lifetime)  1    Deterrents (Past 1 Month)  1    Reasons for Ideation (Lifetime)  4 5   Reasons for Ideation (Past 1 Month)  4 0   Actual Attempt (Lifetime) N N N   Has subject engaged in non-suicidal self-injurious behavior? (Lifetime) N N N   Interrupted Attempts (Lifetime) N N N   Aborted or Self-Interrupted Attempt (Lifetime) N N N   Preparatory Acts or Behavior (Lifetime) N N N   Calculated C-SSRS Risk Score  (Lifetime/Recent) No Risk Indicated No Risk Indicated No Risk Indicated       Personal and Family Medical History:  Patient does report a family history of mental health concerns.  Patient reports family history includes Blood Disease in her mother; Cancer in her maternal uncle and maternal uncle; Diabetes in her daughter, mother, and son; Heart Disease (age of onset: 62) in her father; Hypertension in her father and mother; Thyroid Disease in her mother..     Patient does report Mental Health Diagnosis and/or Treatment.  Patient reported the following previous diagnoses which include(s): an anxiety disorder; depression .  Patient reported symptoms began several years ago.  Patient has received mental health services in the past:  therapy; psychiatry  .  Psychiatric Hospitalizations: none   Patient denies a history of civil commitment.      Currently, patient none  is receiving other mental health services.  These include psychotherapy with [facility] Erika MONTIEL .       Patient has had a physical exam to rule out medical causes for current symptoms.  Date of last physical exam was within the past year. Client was encouraged to follow up with PCP if symptoms were to develop. The patient has a [place] Primary Care Provider, who is named Helga Ibarra.  Patient reports no current medical concerns.  Patient denies any issues with pain..   There are significant appetite / nutritional concerns / weight changes.   Patient does report a history of head injury / trauma / cognitive impairment.      Patient reports current meds as:   Outpatient Medications Marked as Taking for the 2/28/24 encounter (Appointment) with Mica George [facility]   Medication Sig    propranolol ER (INDERAL LA) 60 MG 24 hr capsule Take 1 capsule (60 mg) by mouth daily    traZODone (DESYREL) 50 MG tablet Take 0.5-1 tablets (25-50 mg) by mouth nightly as needed for sleep    venlafaxine (EFFEXOR XR) 75 MG 24 hr capsule Take 1 capsule (75 mg) by mouth  daily     Current Facility-Administered Medications for the 2/28/24 encounter (Appointment) with Mica George [facility]   Medication    lidocaine 1 % 4 mL    lidocaine 1 % injection 3 mL    lidocaine 1 % injection 4 mL    triamcinolone (KENALOG-40) injection 40 mg       Medication Adherence:  Patient reports taking.  taking prescribed medications as prescribed.    Patient Allergies:    Allergies   Allergen Reactions    Abilify [Aripiprazole] Other (See Comments)     Suspected drug-induced Parkinsonism     Fosamax Diarrhea     Diarrhea, stomach cramps, jaw pain     Amoxicillin-Pot Clavulanate Itching and Rash    Amoxicillin-Pot Clavulanate Itching and Rash       Medical History:    Past Medical History:   Diagnosis Date    Abnormal glandular Papanicolaou smear of cervix- ASCUS in 2000 12/5/2003    ASC H- 2000    Basal cell carcinoma of leg, right 05/2016    Dr Rogers    Colon polyps 05/2018    tubular adenoam - due 5 yrs - 2 mm    Concussion without loss of consciousness 4/12/2017    Hidradenitis     left  groin    HSIL (high grade squamous intraepithelial lesion) on Pap smear of cervix 7/5/2000    Normal paps from 2001 to 2016/cc    Hypertension goal BP (blood pressure) < 140/90     OA (osteoarthritis) of knee     moderate    Postmenopausal since 7/2008     Shingles 7/23/2010    left     Squamous cell carcinoma of skin, unspecified     Synovial cyst of popliteal space          Current Mental Status Exam:   Appearance:  Appropriate    Eye Contact:  Good   Psychomotor:  Normal       Gait / station:  no problem  Attitude / Demeanor: Cooperative   Speech      Rate / Production: Normal/ Responsive      Volume:  Normal  volume      Language:  intact  Mood:   Anxious   Affect:   Appropriate    Thought Content: Clear   Thought Process: Coherent  Goal Directed       Associations: No loosening of associations  Insight:   Good   Judgment:  Intact   Orientation:  Person Place Time Situation  All  Attention/concentration: Good    Substance Use:   Patient did report a family history of substance use concerns; see medical history section for details. Notes brother is an alcoholic  Patient has not received chemical dependency treatment in the past.  Patient has not ever been to detox.      Patient is not currently receiving any chemical dependency treatment.           Substance History of use Age of first use Date of last use     Pattern and duration of use (include amounts and frequency)   Alcohol never used       REPORTS SUBSTANCE USE: N/A   Cannabis   never used     REPORTS SUBSTANCE USE: N/A     Amphetamines   never used     REPORTS SUBSTANCE USE: N/A   Cocaine/crack    never used       REPORTS SUBSTANCE USE: N/A   Hallucinogens never used         REPORTS SUBSTANCE USE: N/A   Inhalants never used         REPORTS SUBSTANCE USE: N/A   Heroin never used         REPORTS SUBSTANCE USE: N/A   Other Opiates never used     REPORTS SUBSTANCE USE: N/A   Benzodiazepine   never used     REPORTS SUBSTANCE USE: N/A   Barbiturates never used     REPORTS SUBSTANCE USE: N/A   Over the counter meds never used     REPORTS SUBSTANCE USE: N/A   Caffeine used in the past 15   REPORTS SUBSTANCE USE: N/A   Nicotine  never used     REPORTS SUBSTANCE USE: N/A   Other substances not listed above:  Identify:  never used     REPORTS SUBSTANCE USE: N/A     Patient reported the following problems as a result of their substance use: no problems, not applicable.    Substance Use: No symptoms    Based on the negative CAGE score and clinical interview there  are not indications of drug or alcohol abuse.    Significant Losses / Trauma / Abuse / Neglect Issues:   Patient did not  serve in the .  There are indications or report of significant loss, trauma, abuse or neglect issues related to: are no indications and client denies any losses, trauma, abuse, or neglect concerns. major medical problems concussion 04/2017.   Concerns for  possible neglect are not present.     Safety Assessment:   Patient denies current homicidal ideation and behaviors.  Patient denies current self-injurious ideation and behaviors.    Patient denied risk behaviors associated with substance use.   Patient denies any high risk behaviors associated with mental health symptoms.  Patient reports the following current concerns for their personal safety: None.  Patient reports there are not firearms in the house.       .    History of Safety Concerns:  Patient denied a history of homicidal ideation.     Patient denied a history of personal safety concerns.    Patient denied a history of assaultive behaviors.    Patient denied a history of sexual assault behaviors.     Patient denied a history of risk behaviors associated with substance use.  Patient denies any history of high risk behaviors associated with mental health symptoms.  Patient reports the following protective factors: forward or future oriented thinking; dedication to family or friends; safe and stable environment; regular sleep; effectively controls impulses; regular physical activity; sense of belonging; purpose; secure attachment; help seeking behaviors when distressed; abstinence from substances; adherence with prescribed medication; living with other people; daily obligations; structured day; effective problem solving skills; commitment to well being; sense of meaning; positive social skills; healthy fear of risky behaviors or pain; financial stability; strong sense of self worth or esteem; sense of personal control or determination; access to a variety of clinical interventions and pets    Risk Plan:  See Recommendations for Safety and Risk Management Plan    Review of Symptoms per patient report:   Depression: Change in sleep and Feeling sad, down, or depressed  Seema:  No Symptoms  Psychosis: No Symptoms  Anxiety: Excessive worry, Nervousness, Physical complaints, such as headaches, stomachaches, muscle  tension, Ruminations, and Poor concentration  Panic:  Palpitations and Tremors  Post Traumatic Stress Disorder:  No Symptoms   Eating Disorder: No Symptoms  ADD / ADHD:  No symptoms  Conduct Disorder: No symptoms  Autism Spectrum Disorder: No symptoms  Obsessive Compulsive Disorder: No Symptoms    Current Stressors / Issues:   update:  Reduced effexor since last visit.  On going well.  Brain fog gone.  Stresses:  Headache pain is reduced.  Appetite: Denies  Sleep: Trazadone works, no problem  Outpatient Provider updates: [facility] Erika OCHOA  Uses CALM lorena daily with afternoon anxiety  SI/SIB/HI:  Denies past attempts.  Previous passive ideation  FARIDA:  Denies  Side effects/compliance:  N/a  Interventions:  [facility] engaged in completing DA with psychoeducation with treatment planning  Most important: Med changes are great.    Does homework and books to read.    Uses CALM lorena  5/4/3/2/1  May I skills  Journaling  Putting thoughts in box, for another day  Word lorena  Making choices can be difficult sets limits    Patient reports the following compulsive behaviors and treatment history:  n/a .      Diagnostic Criteria:   Generalized Anxiety Disorder  A. Excessive anxiety and worry about a number of events or activities (such as work or school performance).   B. The person finds it difficult to control the worry.  C. Select 3 or more symptoms (required for diagnosis). Only one item is required in children.   - Restlessness or feeling keyed up or on edge.    - Being easily fatigued.    - Difficulty concentrating or mind going blank.    - Irritability.    - Muscle tension.    - Sleep disturbance (difficulty falling or staying asleep, or restless unsatisfying sleep).   D. The focus of the anxiety and worry is not confined to features of an Axis I disorder.  E. The anxiety, worry, or physical symptoms cause clinically significant distress or impairment in social, occupational, or other important areas of functioning.   F. The disturbance  is not due to the direct physiological effects of a substance (e.g., a drug of abuse, a medication) or a general medical condition (e.g., hyperthyroidism) and does not occur exclusively during a Mood Disorder, a Psychotic Disorder, or a Pervasive Developmental Disorder.    Functional Status:  Patient reports the following functional impairments:  chronic disease management, management of the household and or completion of tasks, relationship(s), and self-care.     Nonprogrammatic care:  Patient is requesting basic services to address current mental health concerns.    Clinical Summary:  1. Psychosocial, Cultural and Contextual Factors: hx of TBI  .  2. Principal DSM5 Diagnoses  (Sustained by DSM5 Criteria Listed Above):   300.02 (F41.1) Generalized Anxiety Disorder.  3. Other Diagnoses that is relevant to services:   N/a  4. Provisional Diagnosis:  N/a  5. Prognosis: Relieve Acute Symptoms.  6. Likely consequences of symptoms if not treated: decompensation of MH.  7. Client strengths include:  educated, empathetic, goal-focused, has a previous history of therapy, insightful, and intelligent .     Recommendations:     1. Plan for Safety and Risk Management:   Safety and Risk: Recommended that patient call 911 or go to the local ED should there be a change in any of these risk factors..          Report to child / adult protection services was NA.     2. Patient's identified mental health concerns with a cultural influence will be addressed by per Pt request .     3. Initial Treatment will focus on:    Depressed Mood - .  Anxiety - . .     4. Resources/Service Plan:    services are not indicated.   Modifications to assist communication are not indicated.   Additional disability accommodations are not indicated.      5. Collaboration:   Collaboration / coordination of treatment will be initiated with the following  support professionals: psychiatry.      6.  Referrals:   The following referral(s) will be  initiated: Psychiatry.       A Release of Information has been obtained for the following:  n/a .     Clinical Substantiation/medical necessity for the above recommendations:  Pt has a hx of depression, anxiety and insomnia symptoms that are impacting daily functioning in daily living and social settings. Through receiving support through CCPS model for medication and [facility] checking on use of coping skills and therapy to help combat these symptoms may provide Pt with relief. Pt reports that they are struggling to manage depressive and anxiety symptoms and again CCPS model can assist with providing coping skills, following up that pt is using these skills, safety plan or other interventions along with medication to have the best impact to manage symptoms and provide relief. At this time pt's symptoms are able to be managed with OP services and pt will be referred to a higher level of care if there are abrupt changes in presentation or risk of harm  .    7. FARIDA:    FARIDA:  Discussed the general effects of drugs and alcohol on health and well-being. Provider gave patient printed information about the  effects of chemical use on their health and well being. Recommendations:  n/a .     8. Records:   These were reviewed at time of assessment.   Information in this assessment was obtained from the medical record and  provided by patient who is a good historian.    Patient will have open access to their mental health medical record.    9.   Interactive Complexity: No    10. Safety Plan:  Patient denied any current/recent/lifetime history of suicidal ideation and/or behaviors.  No safety plan indicated at this time.     Provider Name/ Credentials:  Mica George MA [facility]  February 26, 2024

## 2024-02-28 ENCOUNTER — VIRTUAL VISIT (OUTPATIENT)
Dept: BEHAVIORAL HEALTH | Facility: CLINIC | Age: 69
End: 2024-02-28
Payer: COMMERCIAL

## 2024-02-28 ENCOUNTER — VIRTUAL VISIT (OUTPATIENT)
Dept: PSYCHIATRY | Facility: CLINIC | Age: 69
End: 2024-02-28
Payer: COMMERCIAL

## 2024-02-28 DIAGNOSIS — F41.1 GENERALIZED ANXIETY DISORDER: Primary | ICD-10-CM

## 2024-02-28 DIAGNOSIS — Z87.820 HISTORY OF TRAUMATIC BRAIN INJURY: ICD-10-CM

## 2024-02-28 DIAGNOSIS — F41.1 GAD (GENERALIZED ANXIETY DISORDER): Primary | ICD-10-CM

## 2024-02-28 DIAGNOSIS — G47.00 INSOMNIA, UNSPECIFIED TYPE: ICD-10-CM

## 2024-02-28 PROCEDURE — 90791 PSYCH DIAGNOSTIC EVALUATION: CPT | Mod: 95 | Performed by: COUNSELOR

## 2024-02-28 PROCEDURE — 99214 OFFICE O/P EST MOD 30 MIN: CPT | Mod: 95 | Performed by: PSYCHIATRY & NEUROLOGY

## 2024-02-28 RX ORDER — VENLAFAXINE HYDROCHLORIDE 75 MG/1
75 CAPSULE, EXTENDED RELEASE ORAL DAILY
Qty: 90 CAPSULE | Refills: 3 | Status: SHIPPED | OUTPATIENT
Start: 2024-02-28 | End: 2024-06-26

## 2024-02-28 ASSESSMENT — COLUMBIA-SUICIDE SEVERITY RATING SCALE - C-SSRS
REASONS FOR IDEATION LIFETIME: COMPLETELY TO END OR STOP THE PAIN (YOU COULDN'T GO ON LIVING WITH THE PAIN OR HOW YOU WERE FEELING)
ATTEMPT LIFETIME: NO
TOTAL  NUMBER OF INTERRUPTED ATTEMPTS LIFETIME: NO
6. HAVE YOU EVER DONE ANYTHING, STARTED TO DO ANYTHING, OR PREPARED TO DO ANYTHING TO END YOUR LIFE?: NO
1. HAVE YOU WISHED YOU WERE DEAD OR WISHED YOU COULD GO TO SLEEP AND NOT WAKE UP?: YES
1. IN THE PAST MONTH, HAVE YOU WISHED YOU WERE DEAD OR WISHED YOU COULD GO TO SLEEP AND NOT WAKE UP?: NO
REASONS FOR IDEATION PAST MONTH: DOES NOT APPLY
TOTAL  NUMBER OF ABORTED OR SELF INTERRUPTED ATTEMPTS LIFETIME: NO
2. HAVE YOU ACTUALLY HAD ANY THOUGHTS OF KILLING YOURSELF?: NO

## 2024-02-28 NOTE — PROGRESS NOTES
"Virtual Visit Details    Type of service:  Video Visit   Video Start Time: {video visit start/end time for provider to select:381096}  Video End Time:{video visit start/end time for provider to select:499675}    Originating Location (pt. Location): {video visit patient location:482747::\"Home\"}  {PROVIDER LOCATION On-site should be selected for visits conducted from your clinic location or adjoining [facility] hospital, academic office, or other nearby [facility] building. Off-site should be selected for all other provider locations, including home:011662}  Distant Location (provider location):  {virtual location provider:173330}  Platform used for Video Visit: {Virtual Visit Platforms:041182::\"MusicAll\"}  "

## 2024-02-28 NOTE — NURSING NOTE
Is the patient currently in the state of [place]? YES    Visit mode:VIDEO    If the visit is dropped, the patient can be reconnected by: VIDEO VISIT: Text to cell phone:   Telephone Information:   Mobile [phone]       Will anyone else be joining the visit? NO  (If patient encounters technical issues they should call [phone])    How would you like to obtain your AVS? MyChart    Are changes needed to the allergy or medication list? Pt stated no changes to allergies and Pt stated no med changes    Reason for visit: SHANNAN QUEEN

## 2024-02-28 NOTE — PATIENT INSTRUCTIONS
Treatment Plan:   Continue trazodone 25-50 mg at bedtime as needed for sleep  Continue Effexor-XR/venlafaxine ER 75 mg daily for anxiety.  Continue propranolol EXTENDED RELEASE 60 mg ONCE daily for anxiety, tremor, headaches.  Continue all other cares per primary care provider.   Continue all other medications as reviewed per electronic medical record today.   Safety plan reviewed. To the Emergency Department as needed or call after hours crisis line at [phone] or [phone]. [place] Crisis Text Line. Text [place] to 018599 or Suicide LifeLine Chat: suicidepreventionlifeline.org/chat  Continue individual psychotherapy for additional support and ongoing development of nonpharmacologic coping skills and strategies.  Schedule an appointment with me in 4 months or sooner as needed. Call [facility] at [phone] to schedule.  Follow up with primary care provider as planned or for acute medical concerns.  Call the psychiatric nurse line with medication questions or concerns at [phone].  [employer] may be used to communicate with your provider, but this is not intended to be used for emergencies.      Patient Education   Collaborative Care Psychiatry Service  What to Expect  Here's what to expect from your Collaborative Care Psychiatry Service (CCPS).   About CCPS  CCPS means 2 people work together to help you get better. You'll meet with a behavioral health clinician and a psychiatric doctor. A behavioral health clinician helps people with mental health problems by talking with them. A psychiatric doctor helps people by giving them medicine.  How it works  At every visit, you'll see the behavioral health clinician (BHC) first. They'll talk with you about how you're doing and teach you how to feel better.   Then you'll see the psychiatric doctor. This doctor can help you deal with troubling thoughts and feelings by giving you medicine. They'll make sure you know the plan for your care.   CCPS  "usually takes 3 to 6 visits. If you need more visits, we may have you start seeing a different psychiatric doctor for ongoing care.  If you have any questions or concerns, we'll be glad to talk with you.  About visits  Be open  At your visits, please talk openly about your problems. It may feel hard, but it's the best way for us to help you.  Cancelling visits  If you can't come to your visit, please call us right away at [phone]. If you don't cancel at least 24 hours (1 full day) before your visit, that's \"late cancellation.\"  Being late to visits  Being very late is the same as not showing up. You will be a \"no show\" if:  Your appointment starts with a [facility], and you're more than 15 minutes late for a 30-minute (half hour) visit. This will also cancel your appointment with the psychiatric doctor.  Your appointment is with a psychiatric doctor only, and you're more than 15 minutes late for a 30-minute (half hour) visit.  Your appointment is with a psychiatric doctor only, and you're more than 30 minutes late for a 60-minute (full hour) visit.  If you cancel late or don't show up 2 times within 6 months, we may end your care.   Getting help between visits  If you need help between visits, you can call us Monday to Friday from 8 a.m. to 4:30 p.m. at [phone].  Emergency care  Call 911 or go to the nearest emergency department if your life or someone else's life is in danger.  Call 988 anytime to reach the national Suicide and Crisis hotline.  Medicine refills  To refill your medicine, call your pharmacy. You can also call [facility]'s Behavioral Access at [phone], Monday to Friday, 8 a.m. to 4:30 p.m. It can take 1 to 3 business days to get a refill.   Forms, letters, and tests  You may have papers to fill out, like FMLA, short-term disability, and workability. We can help you with these forms at your visits, but you must have an appointment. You may need more than 1 visit for this, to be in " an intensive therapy program, or both.  Before we can give you medicine for ADHD, we may refer you to get tested for it or confirm it another way.  We may not be able to give you an emotional support animal letter.  We don't do mental health checks ordered by the court.   We don't do mental health testing, but we can refer you to get tested.   Thank you for choosing us for your care.  For informational purposes only. Not to replace the advice of your health care provider. Copyright   2022 [facility]. All rights reserved. [employer] [id number] - 12/22.

## 2024-02-28 NOTE — PROGRESS NOTES
"Telemedicine Visit: The patient's condition can be safely assessed and treated via synchronous audio and visual telemedicine encounter.      Reason for Telemedicine Visit: Patient has requested telehealth visit    Originating Site (Patient Location): Patient's home    Distant Location (provider location): Off-Site    Consent:  The patient/guardian has verbally consented to: the potential risks and benefits of telemedicine (video visit) versus in person care; bill my insurance or make self-payment for services provided; and responsibility for payment of non-covered services.     Mode of Communication:  Video Conference via [employer]    As the provider I attest to compliance with applicable laws and regulations related to telemedicine.        Outpatient Psychiatric Progress Note    Name: Jolynn Haji   : 1955                    Primary Care Provider: Helga Ibarra MD   Therapist: Working with DINESH Whipple (therapist may be out early May- maybe out for 3 months)    PHQ-9 scores:      2024     4:27 PM 2024     5:22 PM 2024     1:21 PM   PHQ-9 SCORE   PHQ-9 Total Score MyChart 1 (Minimal depression) 0 0   PHQ-9 Total Score 1 0 0       TIFFANY-7 scores:      2023     4:55 PM 2024     5:23 PM 2024     1:22 PM   TIFFANY-7 SCORE   Total Score 1 (minimal anxiety) 2 (minimal anxiety) 3 (minimal anxiety)   Total Score 1 2 3       Patient Identification:  Patient is a 68 year old,   White Choose not to answer female  who presents for return visit with me.  Patient is currently retired. Patient attended the phone/video session alone today. Patient prefers to be called: \" Azalea\".    Interim History:  I last saw Jolynn Haji for outpatient psychiatry return visit on 2024. During that appointment, we:  Continue trazodone 25-50 mg at bedtime as needed for sleep  Decrease Effexor-XR/venlafaxine ER to 75 mg daily for anxiety to see if headaches improve. Please reach out if " "anxiety worsens significantly on decreased dose.    Continue propranolol EXTENDED RELEASE 60 mg ONCE daily for anxiety, tremor, headaches.  Continue all other cares per primary care provider.     2/28: Patient overall doing quite well.  Brain fog is gone and headaches are drastically improved since decreasing venlafaxine to 75 mg daily.  Still some anxiety patient experiences throughout the day but very manageable and easily able to distract self.  Reports the anxiety does not interfere with patient going about day-to-day activities.  No mood symptoms.  Will be traveling to [place] late March.  Will be going with family.  Working on the kitchen remodeling her cabin.  No acute safety concerns.  No SI.  No problematic drug or alcohol use.  Continues in psychotherapy every couple of weeks.    Per [facility], [facility], during today's team-based visit:  Current Stressors / Issues:  MH update:  Reduced effexor since last visit.  On going well.  Brain fog gone.  Stresses:  Headache pain is reduced.  Appetite: Denies  Sleep: Trazadone works, no problem  Outpatient Provider updates: [facility] Erika RAINObdulia  Uses CALM lorena daily with afternoon anxiety  SI/SIB/HI:  Denies past attempts.  Previous passive ideation  FARIDA:  Denies  Side effects/compliance:  N/a  Interventions:  [facility] engaged in completing DA with psychoeducation with treatment planning  Most important: Med changes are great.    Past Psychiatric Med Trials:  Psych Meds at Intake:  lexapro 20 mg daily  abilify 5 mg daily      Past Psych Meds:  Amitriptyline  Quetiapine  Mirtazapine - stopped \"because I was sleeping through the night;\" hallucinations     Psychiatric ROS:  Jolynn Haji reports mood has been: Stable  Anxiety has been: Manageable  Sleep has been: Stable, trazodone working well  Seema sxs: None  Psychosis sxs: N/A  ADHD/ADD sxs: N/A  PTSD sxs: N/A  PHQ9 and GAD7 scores were reviewed today if completed.   Medication side effects: Denies propranolol ASEs;  headaches " from venlafaxine drastically improved  Current stressors include: Symptoms and See HPI above  Coping mechanisms and supports include: Family, Hobbies and Friends, therapy    Current medications include:   Current Outpatient Medications   Medication Sig    clindamycin (CLEOCIN T) 1 % external lotion Apply topically 2 times daily    lactobacillus rhamnosus, GG, (CULTURELL) capsule Take 1 capsule by mouth daily Taking every other day    lisinopril (ZESTRIL) 5 MG tablet Take 1 tablet (5 mg) by mouth daily    propranolol ER (INDERAL LA) 60 MG 24 hr capsule Take 1 capsule (60 mg) by mouth daily    traZODone (DESYREL) 50 MG tablet Take 0.5-1 tablets (25-50 mg) by mouth nightly as needed for sleep    venlafaxine (EFFEXOR XR) 75 MG 24 hr capsule Take 1 capsule (75 mg) by mouth daily     Current Facility-Administered Medications   Medication    lidocaine 1 % 4 mL    lidocaine 1 % injection 3 mL    lidocaine 1 % injection 4 mL    triamcinolone (KENALOG-40) injection 40 mg       Past Medical/Surgical History:  Past Medical History:   Diagnosis Date    Abnormal glandular Papanicolaou smear of cervix- ASCUS in 2000 12/5/2003    ASC H- 2000    Basal cell carcinoma of leg, right 05/2016    Dr Rogers    Colon polyps 05/2018    tubular adenoam - due 5 yrs - 2 mm    Concussion without loss of consciousness 4/12/2017    Hidradenitis     left  groin    HSIL (high grade squamous intraepithelial lesion) on Pap smear of cervix 7/5/2000    Normal paps from 2001 to 2016/cc    Hypertension goal BP (blood pressure) < 140/90     OA (osteoarthritis) of knee     moderate    Postmenopausal since 7/2008     Shingles 7/23/2010    left     Squamous cell carcinoma of skin, unspecified     Synovial cyst of popliteal space       has a past medical history of Abnormal glandular Papanicolaou smear of cervix- ASCUS in 2000 (12/5/2003), Basal cell carcinoma of leg, right (05/2016), Colon polyps (05/2018), Concussion without loss of consciousness  (4/12/2017), Hidradenitis, HSIL (high grade squamous intraepithelial lesion) on Pap smear of cervix (7/5/2000), Hypertension goal BP (blood pressure) < 140/90, OA (osteoarthritis) of knee, Postmenopausal (since 7/2008 ), Shingles (7/23/2010), Squamous cell carcinoma of skin, unspecified, and Synovial cyst of popliteal space.    She has no past medical history of Malignant melanoma (H) or Squamous cell carcinoma.    Social History:  Reviewed. No changes to social history except as noted above in HPI.    Vital Signs:   None. This is phone/video visit.     Labs:  Most recent laboratory results reviewed and the pertinent results include:   TSH   Date Value Ref Range Status   02/05/2024 1.26 0.30 - 4.20 uIU/mL Final   04/25/2022 1.30 0.40 - 4.00 mU/L Final   01/13/2021 2.07 0.40 - 4.00 mU/L Final     Lab Results   Component Value Date    WBC 8.1 04/25/2022    WBC 6.4 01/13/2021     Lab Results   Component Value Date    RBC 4.58 04/25/2022    RBC 4.54 01/13/2021     Lab Results   Component Value Date    HGB 13.6 04/25/2022    HGB 13.3 01/13/2021     Lab Results   Component Value Date    HCT 41.1 04/25/2022    HCT 41.7 01/13/2021     No components found for: MCT  Lab Results   Component Value Date    MCV 90 04/25/2022    MCV 92 01/13/2021     Lab Results   Component Value Date    MCH 29.7 04/25/2022    MCH 29.3 01/13/2021     Lab Results   Component Value Date    MCHC 33.1 04/25/2022    MCHC 31.9 01/13/2021     Lab Results   Component Value Date    RDW 12.9 04/25/2022    RDW 13.1 01/13/2021     Lab Results   Component Value Date     04/25/2022     01/13/2021     Last Comprehensive Metabolic Panel:  Sodium   Date Value Ref Range Status   02/05/2024 138 135 - 145 mmol/L Final     Comment:     Reference intervals for this test were updated on 09/26/2023 to more accurately reflect our healthy population. There may be differences in the flagging of prior results with similar values performed with this method.  Interpretation of those prior results can be made in the context of the updated reference intervals.    01/13/2021 140 133 - 144 mmol/L Final     Potassium   Date Value Ref Range Status   02/05/2024 4.7 3.4 - 5.3 mmol/L Final   04/25/2022 4.4 3.4 - 5.3 mmol/L Final   01/13/2021 4.1 3.4 - 5.3 mmol/L Final     Chloride   Date Value Ref Range Status   02/05/2024 102 98 - 107 mmol/L Final   04/25/2022 105 94 - 109 mmol/L Final   01/13/2021 108 94 - 109 mmol/L Final     Carbon Dioxide   Date Value Ref Range Status   01/13/2021 28 20 - 32 mmol/L Final     Carbon Dioxide (CO2)   Date Value Ref Range Status   02/05/2024 27 22 - 29 mmol/L Final   04/25/2022 26 20 - 32 mmol/L Final     Anion Gap   Date Value Ref Range Status   02/05/2024 9 7 - 15 mmol/L Final   04/25/2022 6 3 - 14 mmol/L Final   01/13/2021 4 3 - 14 mmol/L Final     Glucose   Date Value Ref Range Status   02/05/2024 92 70 - 99 mg/dL Final   04/25/2022 104 (H) 70 - 99 mg/dL Final   01/13/2021 91 70 - 99 mg/dL Final     Comment:     Fasting specimen     Urea Nitrogen   Date Value Ref Range Status   02/05/2024 17.4 8.0 - 23.0 mg/dL Final   04/25/2022 23 7 - 30 mg/dL Final   01/13/2021 18 7 - 30 mg/dL Final     Creatinine   Date Value Ref Range Status   02/05/2024 0.68 0.51 - 0.95 mg/dL Final   01/13/2021 0.68 0.52 - 1.04 mg/dL Final     GFR Estimate   Date Value Ref Range Status   02/05/2024 >90 >60 mL/min/1.73m2 Final   01/13/2021 >90 >60 mL/min/[1.73_m2] Final     Comment:     Non  GFR Calc  Starting 12/18/2018, serum creatinine based estimated GFR (eGFR) will be   calculated using the Chronic Kidney Disease Epidemiology Collaboration   (CKD-EPI) equation.       Calcium   Date Value Ref Range Status   02/05/2024 9.9 8.8 - 10.2 mg/dL Final   01/13/2021 8.8 8.5 - 10.1 mg/dL Final     Bilirubin Total   Date Value Ref Range Status   02/05/2024 0.7 <=1.2 mg/dL Final   01/13/2021 0.8 0.2 - 1.3 mg/dL Final     Alkaline Phosphatase   Date Value Ref  Range Status   02/05/2024 75 40 - 150 U/L Final     Comment:     Reference intervals for this test were updated on 11/14/2023 to more accurately reflect our healthy population. There may be differences in the flagging of prior results with similar values performed with this method. Interpretation of those prior results can be made in the context of the updated reference intervals.   01/13/2021 72 40 - 150 U/L Final     ALT   Date Value Ref Range Status   02/05/2024 16 0 - 50 U/L Final     Comment:     Reference intervals for this test were updated on 6/12/2023 to more accurately reflect our healthy population. There may be differences in the flagging of prior results with similar values performed with this method. Interpretation of those prior results can be made in the context of the updated reference intervals.     01/13/2021 17 0 - 50 U/L Final     AST   Date Value Ref Range Status   02/05/2024 19 0 - 45 U/L Final     Comment:     Reference intervals for this test were updated on 6/12/2023 to more accurately reflect our healthy population. There may be differences in the flagging of prior results with similar values performed with this method. Interpretation of those prior results can be made in the context of the updated reference intervals.   01/13/2021 13 0 - 45 U/L Final     Review of Systems:  10 systems (general, cardiovascular, respiratory, eyes, ENT, endocrine, GI, , M/S, neurological) were reviewed. Most pertinent finding(s) is/are: Some chronic pains, see HPI above. The remaining systems are all unremarkable.    Mental Status Examination (limited as this is by phone/video):  Appearance: Awake, alert, appears stated age, no acute distress, well-groomed   Attitude:  cooperative, pleasant   Motor: No gross abnormalities noted today.  Not formally tested  Oriented to:  person, place, time, and situation  Attention Span and Concentration:  normal  Speech: Normal volume, normal rate, normal rhythm,  coherent  Language: intact  Mood: Pretty good  Affect: Overall appropriate and mood congruent  Associations:  no loose associations  Thought Process:  logical, linear and goal oriented  Thought Content:  no evidence of suicidal ideation or homicidal ideation, no evidence of psychotic thought, no auditory hallucinations present and no visual hallucinations present  Recent and Remote Memory: Intact to interview.  Not formally assessed.   Fund of Knowledge: appropriate  Insight: Good  Judgment:  intact, adequate for safety  Impulse Control:  intact    Suicide Risk Assessment:  Today Jolynn Haji reports no suicidal ideation. Based on all available evidence including the factors cited above, Jolynn Haji does not appear to be at imminent risk for self-harm, does not meet criteria for a 72-hr hold, and therefore remains appropriate for ongoing outpatient level of care.  A thorough assessment of risk factors related to suicide and self-harm have been reviewed and are noted above. The patient convincingly denies suicidality on several occasions. Local community safety resources reviewed for patient to use if needed. There was no deceit detected, and the patient presented in a manner that was believable.     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  R/O Underlying Parkinson's Disease (pt working with neurology)    Medical comorbidities include:   Patient Active Problem List    Diagnosis Date Noted    Essential hypertension with goal blood pressure less than 140/90 07/16/2001     Priority: High    Major depressive disorder, recurrent, mild (H24) 12/06/2023     Priority: Medium    Bilateral lower extremity edema- trace pitting edema distal LE to mid pretibial areas Left > right 09/27/2023     Priority: Medium    Tremor 09/27/2023     Priority: Medium    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  and right lower leg - lower pretibial area  07/12/2023     Priority: Medium    Drug-induced parkinsonism (H)- Abilify - generic = ARIPiprazole( 5mg) - RESOLVED OFF ABILIFY  01/02/2023     Priority: Medium    Mood disorder (H24) 01/02/2023     Priority: Medium    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 - much improved - resolved as of 5/5/2023 07/07/2022     Priority: Medium    Adjustment insomnia - resolved as of 5/5/2023 - off of ambien with psychiatry's okay  12/23/2021     Priority: Medium    Screening for cervical cancer      Priority: Medium     1999 NIL  2000 ASCUS-H.  >> Colpo: Neg  2001 NIL   2003 NIL pap, Neg HPV  [id number], 2016  NIL pap  7/25/19 NIL pap, Neg HPV    Criteria necessary to stop screening per ASCCP guidelines:  Older than 65 years  Three consecutive negative cytology results or two consecutive negative cotest results within the previous 10 years, with the most recent being performed within the last 5 years.   (Women with hx of MADDIE 2 or 3, or adenocarcinoma in situ should continue screening for full 20 years, even if this goes past age 65).        Hyperlipidemia LDL goal <130 11/30/2018     Priority: Medium    Basal cell carcinoma      Priority: Medium    Colon polyps 05/01/2018     Priority: Medium    Primary localized osteoarthrosis of left lower leg 02/19/2018     Priority: Medium    Other secondary osteoarthritis of left knee 01/29/2018     Priority: Medium    Chronic constipation 09/14/2017     Priority: Medium    Raynaud's phenomenon without gangrene 09/14/2017     Priority: Medium    Insomnia, unspecified type 07/25/2017     Priority: Medium    TIFFANY (generalized anxiety disorder) 05/23/2017     Priority: Medium    Adjustment disorder with mixed anxiety and depressed mood- mild  at this time 05/11/2017     Priority: Medium    Family history of celiac disease 05/11/2017     Priority: Medium    Basal cell carcinoma of leg, right  05/01/2016     Priority: Medium    Sun-damaged skin 04/21/2016     Priority: Medium    Seasonal allergic rhinitis 04/21/2016     Priority: Medium    Dupuytren's disease of palm - right 4th digit flexor tendon 06/11/2015     Priority: Medium    History of migraine headaches- assoc. with nausea/vomiting - resolved around menopause - were  premenstrual  01/08/2014     Priority: Medium    Rosacea 05/01/2013     Priority: Medium    Numbness of feet- distal feet R>L  02/21/2011     Priority: Medium    NUMBNESS AND TINGLING OF FOOT - bilateral -mild  08/25/2010     Priority: Medium    Osteopenia 01/02/2009     Priority: Medium    Lipidosis 10/24/2006     Priority: Medium     Problem list name updated by automated process. Provider to review      Localized osteoarthritis of hand 11/01/2005     Priority: Medium     Problem list name updated by automated process. Provider to review      Stress incontinence, female - mild 10/23/2012     Priority: Low    Cough 10/23/2012     Priority: Low    Acute bronchitis- recurrent- ? related to mold in school building- pt has since retired and no further bronchitis 10/23/2012     Priority: Low    Postmenopausal      Priority: Low    DERMATOPHYTOSIS OF NAIL- toenails  03/14/2006     Priority: Low     trichophyton rubrum on culture - 1/06       Synovial cyst of popliteal space      Priority: Low    Hidradenitis 12/05/2003     Priority: Low       Psychosocial & Contextual Factors: see HPI above    Assessment:  From Intake, 5/12/2022:  Jolynn Haji is a 66-year-old female with a history including anxiety, depression, insomnia status post concussion in April 2017.  No major mental health struggles prior to concussion in 2017.  Patient was started on Lexapro, quetiapine, mirtazapine to help with her symptoms.  Patient had been feeling much better on that medication combination and once she was sleeping well again quetiapine and mirtazapine were discontinued.  This past January the patient started  to have some worsening symptoms and Abilify was added.  Patient has felt like her current medications of Lexapro and Abilify have been helpful but reports starting to feel really slowed and numb the past several weeks.  She also reported a weight loss of nearly 20 pounds over the last 6 weeks versus several months (patient was not very sure). I am wondering if she could be experiencing side effects of her increased Lexapro dose.  She is agreeable to decreasing her dose slightly to 15 mg daily.  Could also consider decreasing Abilify in the case it is not her Lexapro.  She also desires to sleep a little bit better than she has been.  Could consider restarting mirtazapine at bedtime for sleep and to also augment her Lexapro.  Could also be beneficial for patient to undergo cognitive screening to check in and see what her baseline is status post concussion.  No acute safety concerns.  No SI.  No problematic drug or alcohol use.    5/24/2022:  Patient with some slight improvements of possible medication related side effects after decreasing Lexapro.  I am wondering if there might be more robust improvements with decreasing Abilify/aripiprazole.  Possible drug-induced Parkinsonism?  She does seem to have delayed/slowed speech, masked facies.  Her gait and other movements unable to be observed but she does report other struggles that may be consistent with such a presentation.  We are going to decrease her Abilify down to 2 mg daily and I will see her back in just a few weeks.  We will likely discontinue her Abilify at her next visit.  I will message her primary care provider to get some collateral regarding previous baseline functioning prior to starting Abilify.  History of TBI could be confounding presentation.  Could consider neurology consult.    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  Instructed to stop Abilify today.  We will monitor for improvement of symptoms.  If symptoms do  not improve may need to refer to neurology.  If symptoms start to improve we will consider low-dose methylphenidate augmentation of Lexapro next visit to help with low energy, mood, focus and concentration, cognition, s/p TBI if still clinically indicated.  We would discuss risks and benefits with patient and her daughter at that time.  No other acute safety concerns or SI.  No problematic drug or alcohol use.  If patient starts to struggle with sleep would restart mirtazapine at bedtime.    7/28/2022:  Pt still with sxs consistent with drug-induced Parksinsonism.  Neurology consult will be placed so that patient might get scheduled sooner than later due to the typically long wait.  We will start Ambien in hopes we can get her sleeping much better.  Discussed risks and benefits of its use, particularly in the geriatric population.  Lexapro has not been as helpful as it helped for her anxiety.  We will taper this medication and start Effexor/venlafaxine in hopes that might be more effective for her symptoms.  No acute safety concerns.  No SI.  No problematic drug or alcohol use.    8/11/2022:  Overall with continued improved symptoms.  Hopefully patient will continue to have improvement of her symptoms off of Abilify.  Patient will be seeing neurology soon.  Encouraged to keep appointment.  We will continue taper off of Lexapro and continue on monotherapy with venlafaxine.  We will also continue Ambien at this time for sleep since patient is sleeping much better on the medication with improved symptoms and tolerating well.  No acute safety concerns.  No problematic drug or alcohol use.    9/1/2022:  Ongoing anxiety, still intense at times.  Still not sleeping very well.  Could consider sleep evaluation, but patient quite overwhelmed right now with various referrals and doctors appointments.  Briseyda had been working quite well but not keeping her sleep now.  Still falling asleep relatively well.  We will transition to  Ambien CR to see if that is more helpful.  We will continue to consider sleep evaluation to rule out possibility of REM behavior sleep disorder.  We will also transition venlafaxine to bedtime to see if that helps with any possible nausea.  Working with neurology to monitor for Parkinson's disease.  I do recommend neuropsychological testing due to ongoing possible cognitive difficulties/struggles.  No acute safety concerns.  No SI.  No problematic drug or alcohol use.    10/3/2022:  Patient overall with some ongoing anxiety but slowly improving since it got worse late August.  Recommend continuing to optimize Effexor-XR, especially since patient tolerating well.  No longer having any nausea now that dose is taken at bedtime.  Patient feeling a little foggy and off balance with Ambien CR and so we will switch back to Ambien immediate release.  No falls.  Recommend increasing Effexor-XR first and once well tolerated switching back to Ambien immediate release.  May need to continue to work on alternative options for sleep if does not do well back on Ambien immediate release.  No acute safety concerns.  No SI.  No problematic drug or alcohol use.  Encouraged to continue working with neurology.  Next appointment in November.    10/24/2022:  Patient overall with some improved anxiety on increased dose of Effexor-XR.  Could use additional improvement of anxiety and so we will continue to optimize therapy with Effexor-XR.  We will increase dose to 150 mg daily.  Tolerating well with no notable negative side effects.  Patient transitioned back to Ambien CR and is sleeping better on this compared to Ambien immediate release.  We will continue Ambien CR for sleep.  Discussed possibility of sleep medicine referral but patient declines at this time.  Could consider in the future.  No acute safety concerns.  No SI.  No problematic drug or alcohol use.  Encouraged to continue working with neurology.    12/09/2022:  Patient overall  doing fairly well.  Feels like increased dose of venlafaxine has been helpful.  I do wonder if she is having some subtle withdrawal symptoms late in the afternoon with headache and fatigue.  Discussed shifting her dose to the morning.  However, she reports she will try to drink more water and see if that is helpful before trying to make changes to her medication.  She feels like things are going well enough that she really does not want to change anything.  Encouraged to continue in therapy.  No acute safety concerns.  No SI.  No problematic drug or alcohol use.    1/19/2023:  Patient overall doing well with ongoing improvements.  Patient even recently starting to drive again which is a big deal.  She continues in therapy and finds it helpful.  Discussed possible changes to her dosing to see if headaches might improve, along with fatigue.  Discussed possibly splitting her Effexor-XR dose as 75 mg twice daily versus 37 point 5 in the morning and 112.5 mg at bedtime.  Patient feels like she is in a good place right now and really does not want to make any medication changes if possible.  We will keep things the same for now.  Encouraged to drink lots of water.  No acute safety concerns.  No SI.  No problematic drug or alcohol use.    3/9/2023:  Since last visit we decreased Effexor-XR just slightly.  Unfortunately her anxiety has worsened on decreased dose.  Headaches and fatigue did not improve at all.  Since anxiety worsened we will go back up to 150 mg daily.  Patient prefers this over a trial of further decreased dose.  We will start a trial with propranolol to see if this helps improve anxiety and headaches.  May also help with her tremor.  Discussed watching for feeling too lightheaded or dizzy.  Patient also on low-dose lisinopril.  Last vital signs blood pressure 132/78 and pulse 96 on 2/24/2023.  No acute safety concerns.  No SI.  No problematic drug or alcohol use.    3/23/2023:  Patient overall doing very  well.  Propranolol has been quite helpful.  Still having some anxiety and headaches so we will continue to optimize therapy with propranolol some.  We will increase to 20 mg twice daily.  Neuropsychiatric testing reviewed.  Some mild deficits but nothing indicating a neurocognitive disorder at this time.  This was reassuring to the patient.  Patient will review results with neurologist at upcoming appointment in May.  No acute safety concerns.  No SI.  No problematic drug or alcohol use.    4/27/2023:  Patient overall with relative stability of mood and anxiety symptoms.  We will work to discontinue Ambien to see if we can manage on medication such as trazodone.  I still continue to worry about possibility of prodromal Parkinson's disease.  Patient reported her sister noted patient to be yelling and screaming in her sleep the other night.  This concerns me for possible REM sleep behavior disorder.  Could simply be related to antidepressant therapy-but given recent parkinsonism response to neuroleptic, symptoms should continue to be monitored closely.  Could again consider sleep medicine referral at some point in the future if needed.  Patient also continues to have headaches.  If patient remains stable would like to trial decreased dose of Effexor-XR to see if this helps improve headaches.  No acute safety concerns.  No SI.  No problematic drug or alcohol use.    5/11/2023:  Patient overall doing well.  Was able to replace Ambien with trazodone and still sleeping well.  Still some headaches but greatly improved off Ambien.  Went back to taking propranolol 20 mg twice daily and finds this most effective for her anxiety, tremor, etc.  Discussed we could consider lowering venlafaxine ER in a couple of months if still doing really well and still having some headaches.  Patient has a neurology appointment tomorrow for check-in.  No acute safety concerns.  No SI.  No problematic drug or alcohol use.    7/11/2023:  Patient  overall doing well still.  Mood has remained stable.  Anxiety overall manageable and stable as well.  Unfortunately continues to have headaches and feel fatigued.  Discussed I would like to move ahead with a trial of decreased venlafaxine.  Patient agreeable and will monitor mental health symptoms.  No acute safety concerns.  No SI.  No problematic drug or alcohol use.    10/10/2023:  Patient overall doing okay.  Unfortunately headaches and fatigue have persisted even with decreased dose of venlafaxine.  We will increase propranolol and changed to extended release so it is once a day to see if that is a little bit more helpful for anxiety and headaches.  Patient controlled decreased dose of venlafaxine and we may consider changing to something different after the holidays.  We would like to prevent any decompensation during the holiday season.  No acute safety concerns.  No SI.  No problematic drug or alcohol use.  Patient continues in therapy.    1/9/2024:  Patient overall doing relatively well.  Still getting headaches from venlafaxine so we will decrease the dose further to 75 mg daily to see if helpful without worsening mental health symptoms.  No acute safety concerns.  No SI.  No problematic drug or alcohol use.    2/28/2024:  Patient doing relatively well.  Still some anxiety but manageable.  Anxiety does not currently interfere with patient's ability to function or go about daily activities.  Easily able to distract self.  No major mood symptoms at this time.  Side effects from venlafaxine have drastically improved on lower dose.  If mental health symptoms/anxiety were to worsen in the future, would strongly consider transition to sertraline due to negative side effects on higher doses of venlafaxine.  No acute safety concerns.  No SI.  No problematic drug or alcohol use.    Medication side effects and alternatives were reviewed. Health promotion activities recommended and reviewed today. All questions  addressed. Education and counseling completed regarding risks and benefits of medications and psychotherapy options. Recommend therapy for additional support.     Treatment Plan:   Continue trazodone 25-50 mg at bedtime as needed for sleep  Continue Effexor-XR/venlafaxine ER 75 mg daily for anxiety.  Continue propranolol EXTENDED RELEASE 60 mg ONCE daily for anxiety, tremor, headaches.  Continue all other cares per primary care provider.   Continue all other medications as reviewed per electronic medical record today.   Safety plan reviewed. To the Emergency Department as needed or call after hours crisis line at [phone] or [phone]. [place] Crisis Text Line. Text [place] to 600023 or Suicide LifeLine Chat: suicidepreVilant Systemsline.org/chat  Continue individual psychotherapy for additional support and ongoing development of nonpharmacologic coping skills and strategies.  Schedule an appointment with me in 4 months or sooner as needed. Call [facility] at [phone] to schedule.  Follow up with primary care provider as planned or for acute medical concerns.  Call the psychiatric nurse line with medication questions or concerns at [phone].  [employer] may be used to communicate with your provider, but this is not intended to be used for emergencies.    Administrative Billing:   Phone Call/Video Duration: 20 Minutes  Start: 10:30a  Stop: 10:50a    Patient Status:  Patient will continue to be seen for ongoing consultation and stabilization.    Signed:   Judy Pompa DO  [facility]    Disclaimer: This note consists of symbols derived from keyboarding, dictation and/or voice recognition software. As a result, there may be errors in the script that have gone undetected. Please consider this when interpreting information found in this chart.

## 2024-03-04 NOTE — RESULT ENCOUNTER NOTE
Dear Azalea,     I was looking over some previous lab results and noted that I hadn't sent you a note on them as yet.     Thank you for your patience in getting my comments on your recent results to you.  My sincerest apologies in not getting these to you sooner.     All your last labs that I ordered are normal, improved, or pretty stable from previous.     Please, continue your current medications and/or supplements and follow up as we discussed at your last visit.     For additional lab test information, labtestsonline.org is an excellent reference.    Thank you so much for choosing [facility].  Please contact us with any questions that you may have.   We appreciate the opportunity to serve you now and look forward to supporting your healthcare needs for a long time to come!    Most Sincerely,     Helga Ibarra MD

## 2024-03-04 NOTE — PROGRESS NOTES
[facility] Integrated Behavioral Health  March 6, 2024      Behavioral Health Clinician Progress Note    Patient Name: Jolynn Haji           Service Type:  Individual      Service Location:   MyChart / Email (patient reached)     Session Start Time: 10:27 am   Session End Time: 11:00 am      Session Length: 16 - 37      Attendees: Patient     Service Modality:  Video Visit:      Provider verified identity through the following two step process.  Patient provided:  Patient is known previously to provider    Telemedicine Visit: The patient's condition can be safely assessed and treated via synchronous audio and visual telemedicine encounter.      Reason for Telemedicine Visit: Patient has requested telehealth visit    Originating Site (Patient Location): Patient's home    Distant Site (Provider Location): [facility]    Consent:  The patient/guardian has verbally consented to: the potential risks and benefits of telemedicine (video visit) versus in person care; bill my insurance or make self-payment for services provided; and responsibility for payment of non-covered services.     Patient would like the video invitation sent by:  My Chart    Mode of Communication:  Video Conference via Amwell    As the provider I attest to compliance with applicable laws and regulations related to telemedicine.    Visit Activities (Refresh list every visit): [facility] Only    Diagnostic Assessment Date: 2/28/24 (updated)  Treatment Plan Review Date: 2/20/24  See Flowsheets for today's PHQ-9 and TIFFNAY-7 results  Previous PHQ-9:       1/17/2024     4:27 PM 2/4/2024     5:22 PM 2/25/2024     1:21 PM   PHQ-9 SCORE   PHQ-9 Total Score MyChart 1 (Minimal depression) 0 0   PHQ-9 Total Score 1 0 0     Previous TIFFANY-7:       12/6/2023     4:55 PM 2/4/2024     5:23 PM 2/25/2024     1:22 PM   TIFFANY-7 SCORE   Total Score 1 (minimal anxiety) 2 (minimal anxiety) 3 (minimal anxiety)   Total Score  1 2 3       KARINA LEVEL:      8/27/2010     3:00 PM 2/22/2011    11:00 AM   KARINA Score (Last Two)   KARINA Raw Score 51 49   Activation Score 91.6 82.8   KARINA Level 4 4       DATA  Extended Session (60+ minutes): No  Interactive Complexity: No  Crisis: No  [facility] Patient: No    Treatment Objective(s) Addressed in This Session:  Target Behavior(s):  anxiety and depression    Depressed Mood: Increase interest, engagement, and pleasure in doing things  Decrease frequency and intensity of feeling down, depressed, hopeless  Improve quantity and quality of night time sleep / decrease daytime naps  Feel less tired and more energy during the day   Improve diet, appetite, mindful eating, and / or meal planning  Identify negative self-talk and behaviors: challenge core beliefs, myths, and actions  Improve concentration, focus, and mindfulness in daily activities   Feel less fidgety, restless or slow in daily activities / interpersonal interactions  Anxiety: will experience a reduction in anxiety, will develop more effective coping skills to manage anxiety symptoms, will develop healthy cognitive patterns and beliefs and will increase ability to function adaptively    Current Stressors / Issues:  Reports close friend lost her  to cancer this past week. Has found it emotionally difficult when supporting friend during this difficult time. [facility] treatment house next door has been causing additional stress near her home. Reports added anxiety around the how unpredictable of one of the resident has been. Discussed last visit with Dr. Pompa and is feeling comfortable with plan for medication. Discussed therapist leave in several months. Plan to continue work with pt upon return. Pt would like to see [facility] in the interim every 2-3 weeks.         2/21/24  Reports doing remodeling at the cabin. Trip for [place] has been scheduled for end of March. Reports some anxiety every day and discussed normalizing anxiety. Has been having increased  headaches this past week. Denies any increase in stress or anxiety this past week. Discussed continued use of anxiety skills. Discussed journaling becoming a bit stagnant and discussed using prompts.       2/7/24  Planning a trip to [place] with dtr and her family. Reports doing well with decrease in medication. Reported minimal headaches as a result. Feels anxiety was higher at the initial decrease of med but feeling regulated presently. Discussed normalizing anxiety as part of life. Reports MRI went well for .     1/17/24  Reports decrease in headaches with a decrease in medications. Anxiety appears to be stable. Reports  is having MRI to check lesions on pancreas. Some anxiety around this happening. Discussed taking things as they come and life in the day. Discussed desire to take vacation this winter but not planned at this time.      1/3/24  Reports having a wonderful Dieudonne and New Years with family. Reports continued headaches with not knowing the cause. Anxiety has been pretty good but has noted some increased heart beats. She does not know if the racing heart is always associated with anxiety. Will be talking with psychiatry about possible med change. Pt is fearful of worsening her mental health to improve the headaches. Discussed being open with psychiatry on desires and wants when it comes to medications, even if it means just asking for direction with changes. Pt has a hard time expressing self at times with providers, tends to be more agreeable in appts.       Progress on Treatment Objective(s) / Homework:  Satisfactory progress - ACTION (Actively working towards change); Intervened by reinforcing change plan / affirming steps taken    Motivational Interviewing    MI Intervention: Expressed Empathy/Understanding, Supported Autonomy, Collaboration, Evocation, Open-ended questions, Change talk (evoked) and Reframe     Change Talk Expressed by the Patient: Desire to change Ability to change  Reasons to change Need to change    Provider Response to Change Talk: E - Evoked more info from patient about behavior change, A - Affirmed patient's thoughts, decisions, or attempts at behavior change, R - Reflected patient's change talk and S - Summarized patient's change talk statements      Care Plan review completed: Yes    Medication Review:  No changes to current psychiatric medication(s)    Medication Compliance:  Yes    Changes in Health Issues:   None reported    Chemical Use Review:   Substance Use: Chemical use reviewed, no active concerns identified      Tobacco Use: No current tobacco use.      Assessment: Current Emotional / Mental Status (status of significant symptoms):  Risk status (Self / Other harm or suicidal ideation)  Patient denies a history of suicidal ideation, suicide attempts, self-injurious behavior, homicidal ideation, homicidal behavior and and other safety concerns  Patient denies current fears or concerns for personal safety.  Patient denies current or recent suicidal ideation or behaviors.  Patient denies current or recent homicidal ideation or behaviors.  Patient denies current or recent self injurious behavior or ideation.  Patient denies other safety concerns.  A safety and risk management plan has not been developed at this time, however patient was encouraged to call [place] [phone] should there be a change in any of these risk factors.    Appearance:   Appropriate   Eye Contact:   Good   Psychomotor Behavior: Normal   Attitude:   Cooperative   Orientation:   All  Speech   Rate / Production: Normal    Volume:  Normal   Mood:    Anxious   Affect:    Appropriate   Thought Content:  Clear   Thought Form:  Coherent  Logical   Insight:    Good     Diagnoses:  1. Generalized anxiety disorder    2. Insomnia, unspecified type              Collateral Reports Completed:  Not Applicable    Plan: (Homework, other):  Patient was given information about behavioral services and encouraged  to schedule a follow up appointment with the clinic [facility] in 1 week.  She was also given information about mental health symptoms and treatment options .  CD Recommendations: No indications of CD issues.   Erika Francisco MSW LICSW      ______________________________________________________________________    Integrated Primary Care Behavioral Health Treatment Plan    Patient's Name: Jolynn Haji  YOB: 1955    Date of Creation: 10/5/22  Date Treatment Plan Last Reviewed/Revised: 2/21/24    DSM5 Diagnoses: 300.02 (F41.1) Generalized Anxiety Disorder  Psychosocial / Contextual Factors: Individual Factors high anxiety associated with depressive sx, interfereing with ability to function as she would like.  .  PROMIS (reviewed every 90 days):     Referral / Collaboration:  Was/were discussed and patient will pursue. - individual therapy    Anticipated number of session for this episode of care: 5-8 sessions  Anticipation frequency of session: Weekly  Anticipated Duration of each session: 38-52 minutes  Treatment plan will be reviewed in 90 days or when goals have been changed.       MeasurableTreatment Goal(s) related to diagnosis / functional impairment(s)  Goal 1: Patient will work with provider to manage symptoms    I will know I've met my goal when when less anxious and feeling down.      Objective #A (Patient Action)    Patient will   attend all sessions .  Status: Continued - Date(s): 2/21/24    Intervention(s)  Therapist will teach  educate patient on treatment options, clarify concerns, work with pt to overcome any resistance to compliance. .    Objective #B  Patient will identify 3 fears / thoughts that contribute to feeling anxious.  Status: Continued - Date(s): 2/21/24    Intervention(s)  Therapist will  educate patient on treatment options, clarify concerns, work with pt to overcome any resistance to compliance. .    Objective #C  Patient will identify 3 initial signs or symptoms of anxiety.  Status:  Continued - Date(s): 2/21/24    Intervention(s)  Therapist will  educate patient on treatment options, clarify concerns, work with pt to overcome any resistance to compliance. .        Patient has reviewed and agreed to the above plan.      DINESH Whipple  March 6, 2024      Answers submitted by the patient for this visit:  Patient Health Questionnaire (Submitted on 3/6/2024)  If you checked off any problems, how difficult have these problems made it for you to do your work, take care of things at home, or get along with other people?: Not difficult at all  PHQ9 TOTAL SCORE: 1

## 2024-03-06 ENCOUNTER — VIRTUAL VISIT (OUTPATIENT)
Dept: BEHAVIORAL HEALTH | Facility: CLINIC | Age: 69
End: 2024-03-06
Payer: COMMERCIAL

## 2024-03-06 DIAGNOSIS — G47.00 INSOMNIA, UNSPECIFIED TYPE: ICD-10-CM

## 2024-03-06 DIAGNOSIS — F41.1 GENERALIZED ANXIETY DISORDER: Primary | ICD-10-CM

## 2024-03-06 PROCEDURE — 90832 PSYTX W PT 30 MINUTES: CPT | Mod: 95 | Performed by: SOCIAL WORKER

## 2024-03-18 NOTE — PROGRESS NOTES
[facility] Integrated Behavioral Health  March 20, 2024      Behavioral Health Clinician Progress Note    Patient Name: Jolynn Haji           Service Type:  Individual      Service Location:   MyChart / Email (patient reached)     Session Start Time: 10:00 am   Session End Time: 10:58 am      Session Length: 53 - 60      Attendees: Patient     Service Modality:  Video Visit:      Provider verified identity through the following two step process.  Patient provided:  Patient is known previously to provider    Telemedicine Visit: The patient's condition can be safely assessed and treated via synchronous audio and visual telemedicine encounter.      Reason for Telemedicine Visit: Patient has requested telehealth visit    Originating Site (Patient Location): Patient's home    Distant Site (Provider Location): [facility]    Consent:  The patient/guardian has verbally consented to: the potential risks and benefits of telemedicine (video visit) versus in person care; bill my insurance or make self-payment for services provided; and responsibility for payment of non-covered services.     Patient would like the video invitation sent by:  My Chart    Mode of Communication:  Video Conference via Amwell    As the provider I attest to compliance with applicable laws and regulations related to telemedicine.    Visit Activities (Refresh list every visit): [facility] [place] Only    Diagnostic Assessment Date: 2/28/24 (updated)  Treatment Plan Review Date: 5/21/24  See Flowsheets for today's PHQ-9 and TIFFANY-7 results  Previous PHQ-9:       2/4/2024     5:22 PM 2/25/2024     1:21 PM 3/6/2024    10:18 AM   PHQ-9 SCORE   PHQ-9 Total Score MyChart 0 0 1 (Minimal depression)   PHQ-9 Total Score 0 0 1     Previous TIFFANY-7:       12/6/2023     4:55 PM 2/4/2024     5:23 PM 2/25/2024     1:22 PM   TIFFANY-7 SCORE   Total Score 1 (minimal anxiety) 2 (minimal anxiety) 3 (minimal anxiety)   Total Score  1 2 3       KARINA LEVEL:      8/27/2010     3:00 PM 2/22/2011    11:00 AM   KARINA Score (Last Two)   KARINA Raw Score 51 49   Activation Score 91.6 82.8   KARINA Level 4 4       DATA  Extended Session (60+ minutes): No  Interactive Complexity: No  Crisis: No  [facility] Patient: No    Treatment Objective(s) Addressed in This Session:  Target Behavior(s):  anxiety and depression    Depressed Mood: Increase interest, engagement, and pleasure in doing things  Decrease frequency and intensity of feeling down, depressed, hopeless  Improve quantity and quality of night time sleep / decrease daytime naps  Feel less tired and more energy during the day   Improve diet, appetite, mindful eating, and / or meal planning  Identify negative self-talk and behaviors: challenge core beliefs, myths, and actions  Improve concentration, focus, and mindfulness in daily activities   Feel less fidgety, restless or slow in daily activities / interpersonal interactions  Anxiety: will experience a reduction in anxiety, will develop more effective coping skills to manage anxiety symptoms, will develop healthy cognitive patterns and beliefs and will increase ability to function adaptively    Current Stressors / Issues:  Will be flying to [place] on Monday for family trip. Is having some anxiety about packing for the trip. Discussed skills to use in events leading to trip and while on her trip with family. For the most part she is excited about spending time with her family. Also reports going to womens basketball tournament despite wanting back out due to fear of noise level. Reports having a meeting with neighbors about MICD treatment facility. No real resolve occurred at meeting but does feel supported by neighbors.      3/6/24  Reports close friend lost her  to cancer this past week. Has found it emotionally difficult when supporting friend during this difficult time. [facility] treatment house next door has been causing additional stress near her home. Reports  added anxiety around the how unpredictable of one of the resident has been. Discussed last visit with Dr. Pompa and is feeling comfortable with plan for medication. Discussed therapist leave in several months. Plan to continue work with pt upon return. Pt would like to see [facility] in the interim every 2-3 weeks.         2/21/24  Reports doing remodeling at the cabin. Trip for [place] has been scheduled for end of March. Reports some anxiety every day and discussed normalizing anxiety. Has been having increased headaches this past week. Denies any increase in stress or anxiety this past week. Discussed continued use of anxiety skills. Discussed journaling becoming a bit stagnant and discussed using prompts.       Progress on Treatment Objective(s) / Homework:  Satisfactory progress - ACTION (Actively working towards change); Intervened by reinforcing change plan / affirming steps taken    Motivational Interviewing    MI Intervention: Expressed Empathy/Understanding, Supported Autonomy, Collaboration, Evocation, Open-ended questions, Change talk (evoked) and Reframe     Change Talk Expressed by the Patient: Desire to change Ability to change Reasons to change Need to change    Provider Response to Change Talk: E - Evoked more info from patient about behavior change, A - Affirmed patient's thoughts, decisions, or attempts at behavior change, R - Reflected patient's change talk and S - Summarized patient's change talk statements      Care Plan review completed: Yes    Medication Review:  No changes to current psychiatric medication(s)    Medication Compliance:  Yes    Changes in Health Issues:   None reported    Chemical Use Review:   Substance Use: Chemical use reviewed, no active concerns identified      Tobacco Use: No current tobacco use.      Assessment: Current Emotional / Mental Status (status of significant symptoms):  Risk status (Self / Other harm or suicidal ideation)  Patient denies a history of suicidal ideation,  suicide attempts, self-injurious behavior, homicidal ideation, homicidal behavior and and other safety concerns  Patient denies current fears or concerns for personal safety.  Patient denies current or recent suicidal ideation or behaviors.  Patient denies current or recent homicidal ideation or behaviors.  Patient denies current or recent self injurious behavior or ideation.  Patient denies other safety concerns.  A safety and risk management plan has not been developed at this time, however patient was encouraged to call [place] [phone] should there be a change in any of these risk factors.    Appearance:   Appropriate   Eye Contact:   Good   Psychomotor Behavior: Normal   Attitude:   Cooperative   Orientation:   All  Speech   Rate / Production: Normal    Volume:  Normal   Mood:    Anxious   Affect:    Appropriate   Thought Content:  Clear   Thought Form:  Coherent  Logical   Insight:    Good     Diagnoses:  1. Generalized anxiety disorder    2. Insomnia, unspecified type              Collateral Reports Completed:  Not Applicable    Plan: (Homework, other):  Patient was given information about behavioral services and encouraged to schedule a follow up appointment with the clinic [facility] in 1 week.  She was also given information about mental health symptoms and treatment options .  CD Recommendations: No indications of CD issues.   Erika Nolan [facility]      ______________________________________________________________________    Integrated Primary Care Behavioral Health Treatment Plan    Patient's Name: Jolynn Haji  YOB: 1955    Date of Creation: 10/5/22  Date Treatment Plan Last Reviewed/Revised: 2/21/24    DSM5 Diagnoses: 300.02 (F41.1) Generalized Anxiety Disorder  Psychosocial / Contextual Factors: Individual Factors high anxiety associated with depressive sx, interfereing with ability to function as she would like.  .  PROMIS (reviewed every 90 days):     Referral / Collaboration:  Was/were  discussed and patient will pursue. - individual therapy    Anticipated number of session for this episode of care: 5-8 sessions  Anticipation frequency of session: Weekly  Anticipated Duration of each session: 38-52 minutes  Treatment plan will be reviewed in 90 days or when goals have been changed.       MeasurableTreatment Goal(s) related to diagnosis / functional impairment(s)  Goal 1: Patient will work with provider to manage symptoms    I will know I've met my goal when when less anxious and feeling down.      Objective #A (Patient Action)    Patient will   attend all sessions .  Status: Continued - Date(s): 2/21/24    Intervention(s)  Therapist will teach  educate patient on treatment options, clarify concerns, work with pt to overcome any resistance to compliance. .    Objective #B  Patient will identify 3 fears / thoughts that contribute to feeling anxious.  Status: Continued - Date(s): 2/21/24    Intervention(s)  Therapist will  educate patient on treatment options, clarify concerns, work with pt to overcome any resistance to compliance. .    Objective #C  Patient will identify 3 initial signs or symptoms of anxiety.  Status: Continued - Date(s): 2/21/24    Intervention(s)  Therapist will  educate patient on treatment options, clarify concerns, work with pt to overcome any resistance to compliance. .        Patient has reviewed and agreed to the above plan.      Erika Francisco [facility]  March 20, 2024      Answers submitted by the patient for this visit:  Patient Health Questionnaire (Submitted on 3/6/2024)  If you checked off any problems, how difficult have these problems made it for you to do your work, take care of things at home, or get along with other people?: Not difficult at all  PHQ9 TOTAL SCORE: 1

## 2024-03-20 ENCOUNTER — VIRTUAL VISIT (OUTPATIENT)
Dept: BEHAVIORAL HEALTH | Facility: CLINIC | Age: 69
End: 2024-03-20
Payer: COMMERCIAL

## 2024-03-20 DIAGNOSIS — F41.1 GENERALIZED ANXIETY DISORDER: Primary | ICD-10-CM

## 2024-03-20 DIAGNOSIS — G47.00 INSOMNIA, UNSPECIFIED TYPE: ICD-10-CM

## 2024-03-20 PROCEDURE — 90837 PSYTX W PT 60 MINUTES: CPT | Mod: 95 | Performed by: SOCIAL WORKER

## 2024-03-27 NOTE — PROGRESS NOTES
[facility] Integrated Behavioral Health  April 4, 2024      Behavioral Health Clinician Progress Note    Patient Name: Jolynn Haji           Service Type:  Individual      Service Location:   MyChart / Email (patient reached)     Session Start Time: 9:50 am   Session End Time: 10:42 am      Session Length: 38 - 52      Attendees: Patient     Service Modality:  Video Visit:      Provider verified identity through the following two step process.  Patient provided:  Patient is known previously to provider    Telemedicine Visit: The patient's condition can be safely assessed and treated via synchronous audio and visual telemedicine encounter.      Reason for Telemedicine Visit: Patient has requested telehealth visit    Originating Site (Patient Location): Patient's home    Distant Site (Provider Location): [facility]    Consent:  The patient/guardian has verbally consented to: the potential risks and benefits of telemedicine (video visit) versus in person care; bill my insurance or make self-payment for services provided; and responsibility for payment of non-covered services.     Patient would like the video invitation sent by:  My Chart    Mode of Communication:  Video Conference via Amwell    As the provider I attest to compliance with applicable laws and regulations related to telemedicine.    Visit Activities (Refresh list every visit): [facility] Only    Diagnostic Assessment Date: 2/28/24 (updated)  Treatment Plan Review Date: 5/21/24  See Flowsheets for today's PHQ-9 and TIFFANY-7 results  Previous PHQ-9:       2/4/2024     5:22 PM 2/25/2024     1:21 PM 3/6/2024    10:18 AM   PHQ-9 SCORE   PHQ-9 Total Score MyChart 0 0 1 (Minimal depression)   PHQ-9 Total Score 0 0 1     Previous TIFFANY-7:       12/6/2023     4:55 PM 2/4/2024     5:23 PM 2/25/2024     1:22 PM   TIFFANY-7 SCORE   Total Score 1 (minimal anxiety) 2 (minimal anxiety) 3 (minimal anxiety)   Total Score 1  2 3       KARINA LEVEL:      8/27/2010     3:00 PM 2/22/2011    11:00 AM   KARINA Score (Last Two)   KARINA Raw Score 51 49   Activation Score 91.6 82.8   KARINA Level 4 4       DATA  Extended Session (60+ minutes): No  Interactive Complexity: No  Crisis: No  H Patient: No    Treatment Objective(s) Addressed in This Session:  Target Behavior(s):  anxiety and depression    Depressed Mood: Increase interest, engagement, and pleasure in doing things  Decrease frequency and intensity of feeling down, depressed, hopeless  Improve quantity and quality of night time sleep / decrease daytime naps  Feel less tired and more energy during the day   Improve diet, appetite, mindful eating, and / or meal planning  Identify negative self-talk and behaviors: challenge core beliefs, myths, and actions  Improve concentration, focus, and mindfulness in daily activities   Feel less fidgety, restless or slow in daily activities / interpersonal interactions  Anxiety: will experience a reduction in anxiety, will develop more effective coping skills to manage anxiety symptoms, will develop healthy cognitive patterns and beliefs and will increase ability to function adaptively    Current Stressors / Issues:  Reports trip to [place] was very nice with family. Reports high anxiety leading up to the trip. She really worked on being in the present while on vacation. Reports more headaches while on the trip. Reports difficulty with PCP being on a medical leave. Discussed prospective change as changes can be challenging for her. Discussed several reading options to look at perspective changing and managing uncomfortable situations.      3/20/24  Will be flying to [place] on Monday for family trip. Is having some anxiety about packing for the trip. Discussed skills to use in events leading to trip and while on her trip with family. For the most part she is excited about spending time with her family. Also reports going to womens basketball tournament despite wanting  back out due to fear of noise level. Reports having a meeting with neighbors about MICD treatment facility. No real resolve occurred at meeting but does feel supported by neighbors.      3/6/24  Reports close friend lost her  to cancer this past week. Has found it emotionally difficult when supporting friend during this difficult time. MICD treatment house next door has been causing additional stress near her home. Reports added anxiety around the how unpredictable of one of the resident has been. Discussed last visit with Dr. Pompa and is feeling comfortable with plan for medication. Discussed therapist leave in several months. Plan to continue work with pt upon return. Pt would like to see [facility] in the interim every 2-3 weeks.         2/21/24  Reports doing remodeling at the cabin. Trip for [place] has been scheduled for end of March. Reports some anxiety every day and discussed normalizing anxiety. Has been having increased headaches this past week. Denies any increase in stress or anxiety this past week. Discussed continued use of anxiety skills. Discussed journaling becoming a bit stagnant and discussed using prompts.       Progress on Treatment Objective(s) / Homework:  Satisfactory progress - ACTION (Actively working towards change); Intervened by reinforcing change plan / affirming steps taken    Motivational Interviewing    MI Intervention: Expressed Empathy/Understanding, Supported Autonomy, Collaboration, Evocation, Open-ended questions, Change talk (evoked) and Reframe     Change Talk Expressed by the Patient: Desire to change Ability to change Reasons to change Need to change    Provider Response to Change Talk: E - Evoked more info from patient about behavior change, A - Affirmed patient's thoughts, decisions, or attempts at behavior change, R - Reflected patient's change talk and S - Summarized patient's change talk statements      Care Plan review completed: Yes    Medication Review:  No changes to  current psychiatric medication(s)    Medication Compliance:  Yes    Changes in Health Issues:   None reported    Chemical Use Review:   Substance Use: Chemical use reviewed, no active concerns identified      Tobacco Use: No current tobacco use.      Assessment: Current Emotional / Mental Status (status of significant symptoms):  Risk status (Self / Other harm or suicidal ideation)  Patient denies a history of suicidal ideation, suicide attempts, self-injurious behavior, homicidal ideation, homicidal behavior and and other safety concerns  Patient denies current fears or concerns for personal safety.  Patient denies current or recent suicidal ideation or behaviors.  Patient denies current or recent homicidal ideation or behaviors.  Patient denies current or recent self injurious behavior or ideation.  Patient denies other safety concerns.  A safety and risk management plan has not been developed at this time, however patient was encouraged to call [place] [phone] should there be a change in any of these risk factors.    Appearance:   Appropriate   Eye Contact:   Good   Psychomotor Behavior: Normal   Attitude:   Cooperative   Orientation:   All  Speech   Rate / Production: Normal    Volume:  Normal   Mood:    Anxious   Affect:    Appropriate   Thought Content:  Clear   Thought Form:  Coherent  Logical   Insight:    Good     Diagnoses:  1. Generalized anxiety disorder    2. Insomnia, unspecified type              Collateral Reports Completed:  Not Applicable    Plan: (Homework, other):  Patient was given information about behavioral services and encouraged to schedule a follow up appointment with the clinic [facility] in 1 week.  She was also given information about mental health symptoms and treatment options .  CD Recommendations: No indications of CD issues.   Erika STARK [facility]      ______________________________________________________________________    Integrated Primary Care Behavioral Health Treatment  Plan    Patient's Name: Jolynn Haji  YOB: 1955    Date of Creation: 10/5/22  Date Treatment Plan Last Reviewed/Revised: 2/21/24    DSM5 Diagnoses: 300.02 (F41.1) Generalized Anxiety Disorder  Psychosocial / Contextual Factors: Individual Factors high anxiety associated with depressive sx, interfereing with ability to function as she would like.  .  PROMIS (reviewed every 90 days):     Referral / Collaboration:  Was/were discussed and patient will pursue. - individual therapy    Anticipated number of session for this episode of care: 5-8 sessions  Anticipation frequency of session: Weekly  Anticipated Duration of each session: 38-52 minutes  Treatment plan will be reviewed in 90 days or when goals have been changed.       MeasurableTreatment Goal(s) related to diagnosis / functional impairment(s)  Goal 1: Patient will work with provider to manage symptoms    I will know I've met my goal when when less anxious and feeling down.      Objective #A (Patient Action)    Patient will   attend all sessions .  Status: Continued - Date(s): 2/21/24    Intervention(s)  Therapist will teach  educate patient on treatment options, clarify concerns, work with pt to overcome any resistance to compliance. .    Objective #B  Patient will identify 3 fears / thoughts that contribute to feeling anxious.  Status: Continued - Date(s): 2/21/24    Intervention(s)  Therapist will  educate patient on treatment options, clarify concerns, work with pt to overcome any resistance to compliance. .    Objective #C  Patient will identify 3 initial signs or symptoms of anxiety.  Status: Continued - Date(s): 2/21/24    Intervention(s)  Therapist will  educate patient on treatment options, clarify concerns, work with pt to overcome any resistance to compliance. .        Patient has reviewed and agreed to the above plan.      Erika Francisco, [facility]  April 4, 2024      Answers submitted by the patient for this visit:  Patient Health  Questionnaire (Submitted on 3/6/2024)  If you checked off any problems, how difficult have these problems made it for you to do your work, take care of things at home, or get along with other people?: Not difficult at all  PHQ9 TOTAL SCORE: 1

## 2024-04-04 ENCOUNTER — VIRTUAL VISIT (OUTPATIENT)
Dept: BEHAVIORAL HEALTH | Facility: CLINIC | Age: 69
End: 2024-04-04
Payer: COMMERCIAL

## 2024-04-04 DIAGNOSIS — F41.1 GENERALIZED ANXIETY DISORDER: Primary | ICD-10-CM

## 2024-04-04 DIAGNOSIS — G47.00 INSOMNIA, UNSPECIFIED TYPE: ICD-10-CM

## 2024-04-04 PROCEDURE — 90834 PSYTX W PT 45 MINUTES: CPT | Mod: 95 | Performed by: SOCIAL WORKER

## 2024-04-17 NOTE — PROGRESS NOTES
[facility]  April 18, 2024      Behavioral Health Clinician Progress Note    Patient Name: Jolynn Haji           Service Type:  Individual      Service Location:   MyChart / Email (patient reached)     Session Start Time: 9:58 am   Session End Time: 10:37 am      Session Length: 38 - 52      Attendees: Patient     Service Modality:  Video Visit:      Provider verified identity through the following two step process.  Patient provided:  Patient is known previously to provider    Telemedicine Visit: The patient's condition can be safely assessed and treated via synchronous audio and visual telemedicine encounter.      Reason for Telemedicine Visit: Patient has requested telehealth visit    Originating Site (Patient Location): Patient's home    Distant Site (Provider Location): [facility]    Consent:  The patient/guardian has verbally consented to: the potential risks and benefits of telemedicine (video visit) versus in person care; bill my insurance or make self-payment for services provided; and responsibility for payment of non-covered services.     Patient would like the video invitation sent by:  My Chart    Mode of Communication:  Video Conference via [employer]well    As the provider I attest to compliance with applicable laws and regulations related to telemedicine.    Visit Activities (Refresh list every visit): [facility] Only    Diagnostic Assessment Date: 2/28/24 (updated)  Treatment Plan Review Date: 5/21/24  See Flowsheets for today's PHQ-9 and TIFFANY-7 results  Previous PHQ-9:       2/4/2024     5:22 PM 2/25/2024     1:21 PM 3/6/2024    10:18 AM   PHQ-9 SCORE   PHQ-9 Total Score MyChart 0 0 1 (Minimal depression)   PHQ-9 Total Score 0 0 1     Previous TIFFANY-7:       12/6/2023     4:55 PM 2/4/2024     5:23 PM 2/25/2024     1:22 PM   TIFFANY-7 SCORE   Total Score 1 (minimal anxiety) 2 (minimal anxiety) 3 (minimal anxiety)   Total Score 1  2 3       KARINA LEVEL:      8/27/2010     3:00 PM 2/22/2011    11:00 AM   KARINA Score (Last Two)   KARINA Raw Score 51 49   Activation Score 91.6 82.8   KARINA Level 4 4       DATA  Extended Session (60+ minutes): No  Interactive Complexity: No  Crisis: No  H Patient: No    Treatment Objective(s) Addressed in This Session:  Target Behavior(s):  anxiety and depression    Depressed Mood: Increase interest, engagement, and pleasure in doing things  Decrease frequency and intensity of feeling down, depressed, hopeless  Improve quantity and quality of night time sleep / decrease daytime naps  Feel less tired and more energy during the day   Improve diet, appetite, mindful eating, and / or meal planning  Identify negative self-talk and behaviors: challenge core beliefs, myths, and actions  Improve concentration, focus, and mindfulness in daily activities   Feel less fidgety, restless or slow in daily activities / interpersonal interactions  Anxiety: will experience a reduction in anxiety, will develop more effective coping skills to manage anxiety symptoms, will develop healthy cognitive patterns and beliefs and will increase ability to function adaptively    Current Stressors / Issues:  Reports doing pretty well overall. She has been planning on summer events and spending time with grand children. Discussed  use of lemon drops when anxious. Discussed more recent discussions with others about their own struggles with MH. This has been very normalizing for her. Pt can have a tendency of being hard on self for the need for therapy and medications. Reports she was able to read some suggested material. She has found several take away's she really enjoys and will put into practice.     4/4/24  Reports trip to [place] was very nice with family. Reports high anxiety leading up to the trip. She really worked on being in the present while on vacation. Reports more headaches while on the trip. Reports difficulty with PCP being on a medical  leave. Discussed prospective change as changes can be challenging for her. Discussed several reading options to look at perspective changing and managing uncomfortable situations.        Progress on Treatment Objective(s) / Homework:  Satisfactory progress - ACTION (Actively working towards change); Intervened by reinforcing change plan / affirming steps taken    Motivational Interviewing    MI Intervention: Expressed Empathy/Understanding, Supported Autonomy, Collaboration, Evocation, Open-ended questions, Change talk (evoked) and Reframe     Change Talk Expressed by the Patient: Desire to change Ability to change Reasons to change Need to change    Provider Response to Change Talk: E - Evoked more info from patient about behavior change, A - Affirmed patient's thoughts, decisions, or attempts at behavior change, R - Reflected patient's change talk and S - Summarized patient's change talk statements      Care Plan review completed: Yes    Medication Review:  No changes to current psychiatric medication(s)    Medication Compliance:  Yes    Changes in Health Issues:   None reported    Chemical Use Review:   Substance Use: Chemical use reviewed, no active concerns identified      Tobacco Use: No current tobacco use.      Assessment: Current Emotional / Mental Status (status of significant symptoms):  Risk status (Self / Other harm or suicidal ideation)  Patient denies a history of suicidal ideation, suicide attempts, self-injurious behavior, homicidal ideation, homicidal behavior and and other safety concerns  Patient denies current fears or concerns for personal safety.  Patient denies current or recent suicidal ideation or behaviors.  Patient denies current or recent homicidal ideation or behaviors.  Patient denies current or recent self injurious behavior or ideation.  Patient denies other safety concerns.  A safety and risk management plan has not been developed at this time, however patient was encouraged to call  [place] [phone] should there be a change in any of these risk factors.    Appearance:   Appropriate   Eye Contact:   Good   Psychomotor Behavior: Normal   Attitude:   Cooperative   Orientation:   All  Speech   Rate / Production: Normal    Volume:  Normal   Mood:    Anxious   Affect:    Appropriate   Thought Content:  Clear   Thought Form:  Coherent  Logical   Insight:    Good     Diagnoses:  1. Generalized anxiety disorder    2. Insomnia, unspecified type              Collateral Reports Completed:  Not Applicable    Plan: (Homework, other):  Patient was given information about behavioral services and encouraged to schedule a follow up appointment with the clinic [facility] in 1 week.  She was also given information about mental health symptoms and treatment options .  CD Recommendations: No indications of CD issues.   Erika Francisco MSW LICSW      ______________________________________________________________________    Integrated Primary Care Behavioral Health Treatment Plan    Patient's Name: Jolynn Haji  YOB: 1955    Date of Creation: 10/5/22  Date Treatment Plan Last Reviewed/Revised: 2/21/24    DSM5 Diagnoses: 300.02 (F41.1) Generalized Anxiety Disorder  Psychosocial / Contextual Factors: Individual Factors high anxiety associated with depressive sx, interfereing with ability to function as she would like.  .  PROMIS (reviewed every 90 days):     Referral / Collaboration:  Was/were discussed and patient will pursue. - individual therapy    Anticipated number of session for this episode of care: 5-8 sessions  Anticipation frequency of session: Weekly  Anticipated Duration of each session: 38-52 minutes  Treatment plan will be reviewed in 90 days or when goals have been changed.       MeasurableTreatment Goal(s) related to diagnosis / functional impairment(s)  Goal 1: Patient will work with provider to manage symptoms    I will know I've met my goal when when less anxious and feeling down.      Objective  #A (Patient Action)    Patient will   attend all sessions .  Status: Continued - Date(s): 2/21/24    Intervention(s)  Therapist will teach  educate patient on treatment options, clarify concerns, work with pt to overcome any resistance to compliance. .    Objective #B  Patient will identify 3 fears / thoughts that contribute to feeling anxious.  Status: Continued - Date(s): 2/21/24    Intervention(s)  Therapist will  educate patient on treatment options, clarify concerns, work with pt to overcome any resistance to compliance. .    Objective #C  Patient will identify 3 initial signs or symptoms of anxiety.  Status: Continued - Date(s): 2/21/24    Intervention(s)  Therapist will  educate patient on treatment options, clarify concerns, work with pt to overcome any resistance to compliance. .        Patient has reviewed and agreed to the above plan.      Erika Francisco [facility]  April 18, 2024      Answers submitted by the patient for this visit:  Patient Health Questionnaire (Submitted on 3/6/2024)  If you checked off any problems, how difficult have these problems made it for you to do your work, take care of things at home, or get along with other people?: Not difficult at all  PHQ9 TOTAL SCORE: 1    Answers submitted by the patient for this visit:  Patient Health Questionnaire (Submitted on 4/18/2024)  If you checked off any problems, how difficult have these problems made it for you to do your work, take care of things at home, or get along with other people?: Not difficult at all  PHQ9 TOTAL SCORE: 0

## 2024-04-18 ENCOUNTER — VIRTUAL VISIT (OUTPATIENT)
Dept: BEHAVIORAL HEALTH | Facility: CLINIC | Age: 69
End: 2024-04-18
Payer: COMMERCIAL

## 2024-04-18 DIAGNOSIS — F41.1 GENERALIZED ANXIETY DISORDER: Primary | ICD-10-CM

## 2024-04-18 DIAGNOSIS — G47.00 INSOMNIA, UNSPECIFIED TYPE: ICD-10-CM

## 2024-04-18 PROCEDURE — 90834 PSYTX W PT 45 MINUTES: CPT | Mod: 95 | Performed by: SOCIAL WORKER

## 2024-04-23 ASSESSMENT — ANXIETY QUESTIONNAIRES
7. FEELING AFRAID AS IF SOMETHING AWFUL MIGHT HAPPEN: NOT AT ALL
1. FEELING NERVOUS, ANXIOUS, OR ON EDGE: MORE THAN HALF THE DAYS
4. TROUBLE RELAXING: NOT AT ALL
GAD7 TOTAL SCORE: 2
GAD7 TOTAL SCORE: 2
3. WORRYING TOO MUCH ABOUT DIFFERENT THINGS: NOT AT ALL
2. NOT BEING ABLE TO STOP OR CONTROL WORRYING: NOT AT ALL
6. BECOMING EASILY ANNOYED OR IRRITABLE: NOT AT ALL
5. BEING SO RESTLESS THAT IT IS HARD TO SIT STILL: NOT AT ALL
7. FEELING AFRAID AS IF SOMETHING AWFUL MIGHT HAPPEN: NOT AT ALL

## 2024-04-25 ENCOUNTER — OFFICE VISIT (OUTPATIENT)
Dept: FAMILY MEDICINE | Facility: CLINIC | Age: 69
End: 2024-04-25
Payer: COMMERCIAL

## 2024-04-25 VITALS
SYSTOLIC BLOOD PRESSURE: 118 MMHG | WEIGHT: 149 LBS | RESPIRATION RATE: 16 BRPM | HEART RATE: 68 BPM | TEMPERATURE: 97.5 F | OXYGEN SATURATION: 97 % | DIASTOLIC BLOOD PRESSURE: 80 MMHG | HEIGHT: 68 IN | BODY MASS INDEX: 22.58 KG/M2

## 2024-04-25 DIAGNOSIS — G47.00 INSOMNIA, UNSPECIFIED TYPE: ICD-10-CM

## 2024-04-25 DIAGNOSIS — I10 ESSENTIAL HYPERTENSION WITH GOAL BLOOD PRESSURE LESS THAN 140/90: Primary | ICD-10-CM

## 2024-04-25 DIAGNOSIS — F43.23 ADJUSTMENT DISORDER WITH MIXED ANXIETY AND DEPRESSED MOOD: ICD-10-CM

## 2024-04-25 DIAGNOSIS — R51.9 CHRONIC NONINTRACTABLE HEADACHE, UNSPECIFIED HEADACHE TYPE: ICD-10-CM

## 2024-04-25 DIAGNOSIS — F51.02 ADJUSTMENT INSOMNIA: ICD-10-CM

## 2024-04-25 DIAGNOSIS — F33.0 MAJOR DEPRESSIVE DISORDER, RECURRENT, MILD (H): ICD-10-CM

## 2024-04-25 DIAGNOSIS — F41.1 GAD (GENERALIZED ANXIETY DISORDER): ICD-10-CM

## 2024-04-25 DIAGNOSIS — Z51.81 MEDICATION MONITORING ENCOUNTER: ICD-10-CM

## 2024-04-25 DIAGNOSIS — R25.1 TREMOR: ICD-10-CM

## 2024-04-25 DIAGNOSIS — G89.29 CHRONIC NONINTRACTABLE HEADACHE, UNSPECIFIED HEADACHE TYPE: ICD-10-CM

## 2024-04-25 PROCEDURE — 99214 OFFICE O/P EST MOD 30 MIN: CPT | Performed by: FAMILY MEDICINE

## 2024-04-25 RX ORDER — LISINOPRIL 2.5 MG/1
2.5 TABLET ORAL DAILY
Qty: 90 TABLET | Refills: 3 | Status: SHIPPED | OUTPATIENT
Start: 2024-04-25 | End: 2024-08-14 | Stop reason: SINTOL

## 2024-04-25 ASSESSMENT — ANXIETY QUESTIONNAIRES
7. FEELING AFRAID AS IF SOMETHING AWFUL MIGHT HAPPEN: NOT AT ALL
5. BEING SO RESTLESS THAT IT IS HARD TO SIT STILL: NOT AT ALL
1. FEELING NERVOUS, ANXIOUS, OR ON EDGE: NOT AT ALL
6. BECOMING EASILY ANNOYED OR IRRITABLE: NOT AT ALL
3. WORRYING TOO MUCH ABOUT DIFFERENT THINGS: NOT AT ALL
2. NOT BEING ABLE TO STOP OR CONTROL WORRYING: NOT AT ALL
GAD7 TOTAL SCORE: 0
8. IF YOU CHECKED OFF ANY PROBLEMS, HOW DIFFICULT HAVE THESE MADE IT FOR YOU TO DO YOUR WORK, TAKE CARE OF THINGS AT HOME, OR GET ALONG WITH OTHER PEOPLE?: NOT DIFFICULT AT ALL
IF YOU CHECKED OFF ANY PROBLEMS ON THIS QUESTIONNAIRE, HOW DIFFICULT HAVE THESE PROBLEMS MADE IT FOR YOU TO DO YOUR WORK, TAKE CARE OF THINGS AT HOME, OR GET ALONG WITH OTHER PEOPLE: NOT DIFFICULT AT ALL
4. TROUBLE RELAXING: NOT AT ALL
GAD7 TOTAL SCORE: 0
GAD7 TOTAL SCORE: 0
7. FEELING AFRAID AS IF SOMETHING AWFUL MIGHT HAPPEN: NOT AT ALL

## 2024-04-25 ASSESSMENT — PATIENT HEALTH QUESTIONNAIRE - PHQ9
10. IF YOU CHECKED OFF ANY PROBLEMS, HOW DIFFICULT HAVE THESE PROBLEMS MADE IT FOR YOU TO DO YOUR WORK, TAKE CARE OF THINGS AT HOME, OR GET ALONG WITH OTHER PEOPLE: NOT DIFFICULT AT ALL
SUM OF ALL RESPONSES TO PHQ QUESTIONS 1-9: 0
SUM OF ALL RESPONSES TO PHQ QUESTIONS 1-9: 0

## 2024-04-25 NOTE — PROGRESS NOTES
Assessment & Plan     Essential hypertension with goal blood pressure less than 140/90    - lisinopril (ZESTRIL) 2.5 MG tablet  Dispense: 90 tablet; Refill: 3    Major depressive disorder, recurrent, mild (H24)- in partial remission on medications      Adjustment disorder with mixed anxiety and depressed mood- mild  at this time -in partial remission on medications      TIFFANY (generalized anxiety disorder)      Insomnia, unspecified type      Adjustment insomnia - resolved as of 5/5/2023 - off of ambien with psychiatry's okay       Chronic nonintractable headache, unspecified headache type      Tremor      Medication monitoring encounter    Prescription drug management    30 minutes spent by me on the date of the encounter doing chart review, history and exam, documentation and further activities per the note    Regular exercise    Plan:    1) Medications: reviewed, decrease lisinopril to 2.5 mg has noted some BP's into 80's / 50's, may need to stop    2) Labs: reviewed from 2/5/2024    3) Immunizations: reviewed    4) Imaging/Diagnostics: HCM UTD    5) Consults: Psychology / Psychiatry (Erika Francisco, Judy Pompa, ), Derm - Dr Rogers    Chanelle Tristan is a 68 year old, presenting for the following health issues:  Recheck Medication        4/25/2024     1:54 PM   Additional Questions   Roomed by Orquidea     History of Present Illness       Mental Health Follow-up:  Patient presents to follow-up on Anxiety.    Patient's anxiety since last visit has been:  Better  The patient is having other symptoms associated with anxiety.  Any significant life events: No  Patient is feeling anxious or having panic attacks.  Patient has no concerns about alcohol or drug use.    Reason for visit:  Low blood pressure, medication update    She eats 4 or more servings of fruits and vegetables daily.She consumes 0 sweetened beverage(s) daily.She exercises with enough effort to increase her heart rate 20 to 29 minutes per day.  She  exercises with enough effort to increase her heart rate 4 days per week.   She is taking medications regularly.     Depression / Anxiety / Adjustment - doing well - psychology / psychiatry        3/6/2024    10:18 AM 4/18/2024     9:49 AM 4/25/2024     1:55 PM   PHQ   PHQ-9 Total Score 1 0 0   Q9: Thoughts of better off dead/self-harm past 2 weeks Not at all Not at all Not at all           2/25/2024     1:22 PM 4/23/2024     3:26 PM 4/25/2024     1:56 PM   TIFFANY-7 SCORE   Total Score 3 (minimal anxiety) 2 (minimal anxiety) 0 (minimal anxiety)   Total Score 3 2 0     Hypertension    BP Readings from Last 3 Encounters:   04/25/24 118/80   02/05/24 136/86   12/06/23 110/70     Creatinine   Date Value Ref Range Status   02/05/2024 0.68 0.51 - 0.95 mg/dL Final   01/13/2021 0.68 0.52 - 1.04 mg/dL Final     GFR Estimate   Date Value Ref Range Status   02/05/2024 >90 >60 mL/min/1.73m2 Final   01/13/2021 >90 >60 mL/min/[1.73_m2] Final     Comment:     Non  GFR Calc  Starting 12/18/2018, serum creatinine based estimated GFR (eGFR) will be   calculated using the Chronic Kidney Disease Epidemiology Collaboration   (CKD-EPI) equation.       Headaches - improving    Tremor - fines - improved    Patient Active Problem List   Diagnosis    Essential hypertension with goal blood pressure less than 140/90    Hidradenitis    Localized osteoarthritis of hand    Synovial cyst of popliteal space    DERMATOPHYTOSIS OF NAIL- toenails     Lipidosis    Osteopenia    Postmenopausal    NUMBNESS AND TINGLING OF FOOT - bilateral -mild     Numbness of feet- distal feet R>L     Stress incontinence, female - mild    Cough    Acute bronchitis- recurrent- ? related to mold in school building- pt has since retired and no further bronchitis    Rosacea    History of migraine headaches- assoc. with nausea/vomiting - resolved around menopause - were  premenstrual     Dupuytren's disease of palm - right 4th digit flexor tendon    Sun-damaged  skin    Seasonal allergic rhinitis    Basal cell carcinoma of leg, right    Adjustment disorder with mixed anxiety and depressed mood- mild  at this time    Family history of celiac disease    TIFFANY (generalized anxiety disorder)    Insomnia, unspecified type    Chronic constipation    Raynaud's phenomenon without gangrene    Other secondary osteoarthritis of left knee    Primary localized osteoarthrosis of left lower leg    Hyperlipidemia LDL goal <130    Basal cell carcinoma    Colon polyps    Screening for cervical cancer    Adjustment insomnia - resolved as of 5/5/2023 - off of ambien with psychiatry's okay     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 - much improved - resolved as of 5/5/2023     Drug-induced parkinsonism (H)- Abilify - generic = ARIPiprazole( 5mg) - RESOLVED OFF ABILIFY     Mood disorder (H24)    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 - lower pretibial area     Bilateral lower extremity edema- trace pitting edema distal LE to mid pretibial areas Left > right    Tremor    Major depressive disorder, recurrent, mild (H24)       Past Medical History:   Diagnosis Date    Abnormal glandular Papanicolaou smear of cervix- ASCUS in 2000 12/5/2003    ASC H- 2000    Basal cell carcinoma of leg, right 05/2016    Dr Rogers    Colon polyps 05/2018    tubular adenoam - due 5 yrs - 2 mm    Concussion without loss of consciousness 4/12/2017    Hidradenitis     left  groin    HSIL (high grade squamous intraepithelial lesion) on Pap smear of cervix 7/5/2000    Normal paps from 2001 to 2016/cc    Hypertension goal BP (blood pressure) < 140/90     OA (osteoarthritis) of knee     moderate    Postmenopausal since 7/2008     Shingles 7/23/2010    left     Squamous cell carcinoma of skin, unspecified     Synovial cyst of popliteal space        Past Surgical History:   Procedure Laterality Date    COLONOSCOPY   2018    polyps, tubular - 2 mm - due 5 yrs    COLONOSCOPY N/A 2023    Procedure: Colonoscopy;  Surgeon: Yogesh Cedeño MD;  Location: [facility] NONSPECIFIC PROCEDURE      Colposcopy       Current Outpatient Medications   Medication Sig Dispense Refill    lisinopril (ZESTRIL) 2.5 MG tablet Take 1 tablet (2.5 mg) by mouth daily 90 tablet 3    propranolol ER (INDERAL LA) 60 MG 24 hr capsule Take 1 capsule (60 mg) by mouth daily 90 capsule 3    venlafaxine (EFFEXOR XR) 75 MG 24 hr capsule Take 1 capsule (75 mg) by mouth daily 90 capsule 3    clindamycin (CLEOCIN T) 1 % external lotion Apply topically 2 times daily 60 mL 3    lactobacillus rhamnosus, GG, (CULTURELL) capsule Take 1 capsule by mouth daily Taking every other day      traZODone (DESYREL) 50 MG tablet Take 0.5-1 tablets (25-50 mg) by mouth nightly as needed for sleep 90 tablet 3       Allergies   Allergen Reactions    Abilify [Aripiprazole] Other (See Comments)     Suspected drug-induced Parkinsonism     Fosamax Diarrhea     Diarrhea, stomach cramps, jaw pain     Amoxicillin-Pot Clavulanate Itching and Rash    Amoxicillin-Pot Clavulanate Itching and Rash       Family History   Problem Relation Age of Onset    Thyroid Disease Mother         Graves disease    Hypertension Mother     Diabetes Mother         type 2    Blood Disease Mother          from leukemia at age 78    Heart Disease Father 62        MV problem with CHF  at 62/ from MI    Hypertension Father     Diabetes Son         Juvenile Diabetes    Diabetes Daughter         Juvenile Diabetes    Cancer Maternal Uncle         type ??    Cancer Maternal Uncle         liver    Colon Cancer No family hx of        Social History     Socioeconomic History    Marital status:      Spouse name: Zach    Number of children: 2    Years of education: 16    Highest education level: None   Occupational History    Occupation: teacher first grade -retired at age 60      Comment: [employer]     Employer: [employer]   Tobacco Use    Smoking status: Never    Smokeless tobacco: Never   Vaping Use    Vaping status: Never Used   Substance and Sexual Activity    Alcohol use: Yes     Alcohol/week: 0.0 - 1.0 standard drinks of alcohol     Comment: 2 per month    Drug use: No     Comment: no herbal meds either    Sexual activity: Yes     Partners: Male     Birth control/protection: Surgical     Comment: -was on DepoProvera since 2000-2/2008 -  had vasectomy   Other Topics Concern     Service No    Blood Transfusions No    Caffeine Concern No    Occupational Exposure No    Hobby Hazards No    Sleep Concern No    Stress Concern No    Weight Concern No    Special Diet No    Back Care No    Exercise Yes     Comment: regular     Bike Helmet No    Seat Belt Yes     Comment: always    Self-Exams Yes     Comment: SBE  encouraged monthly    Parent/sibling w/ CABG, MI or angioplasty before 65F 55M? No   Social History Narrative    [place] - [employer] - going to be 6 yrs old spring 2021---needs walks every day. --Helga Ibarra MD      January 13, 2021      Social Determinants of Health     Financial Resource Strain: Low Risk  (2/4/2024)    Financial Resource Strain     Within the past 12 months, have you or your family members you live with been unable to get utilities (heat, electricity) when it was really needed?: No   Food Insecurity: Low Risk  (2/4/2024)    Food Insecurity     Within the past 12 months, did you worry that your food would run out before you got money to buy more?: No     Within the past 12 months, did the food you bought just not last and you didn t have money to get more?: No   Transportation Needs: Low Risk  (2/4/2024)    Transportation Needs     Within the past 12 months, has lack of transportation kept you from medical appointments, getting your medicines, non-medical meetings or appointments, work, or  "from getting things that you need?: No   Physical Activity: Sufficiently Active (2/4/2024)    Exercise Vital Sign     Days of Exercise per Week: 4 days     Minutes of Exercise per Session: 40 min   Stress: No Stress Concern Present (2/4/2024)    [employer] [place] [employer] - Occupational Stress Questionnaire     Feeling of Stress : Not at all   Social Connections: Unknown (2/4/2024)    Social Connection and Isolation Panel [NHANES]     Frequency of Social Gatherings with Friends and Family: Three times a week   Interpersonal Safety: Low Risk  (2/5/2024)    Interpersonal Safety     Do you feel physically and emotionally safe where you currently live?: Yes     Within the past 12 months, have you been hit, slapped, kicked or otherwise physically hurt by someone?: No     Within the past 12 months, have you been humiliated or emotionally abused in other ways by your partner or ex-partner?: No   Housing Stability: Low Risk  (2/4/2024)    Housing Stability     Do you have housing? : Yes     Are you worried about losing your housing?: No     Review of Systems  CONSTITUTIONAL: NEGATIVE for fever, chills, change in weight  INTEGUMENTARY/SKIN: NEGATIVE for worrisome rashes, moles or lesions  EYES: NEGATIVE for vision changes or irritation  ENT/MOUTH: NEGATIVE for ear, mouth and throat problems  RESP: NEGATIVE for significant cough or SOB  CV: NEGATIVE for chest pain, palpitations or peripheral edema  GI: NEGATIVE for nausea, abdominal pain, heartburn, or change in bowel habits  : NEGATIVE for frequency, dysuria, or hematuria  MUSCULOSKELETAL: NEGATIVE for significant arthralgias or myalgia  NEURO: NEGATIVE for weakness, dizziness or paresthesias  ENDOCRINE: NEGATIVE for temperature intolerance, skin/hair changes  HEME: NEGATIVE for bleeding problems  PSYCHIATRIC: NEGATIVE for changes in mood or affect      Objective    /80   Pulse 68   Temp 97.5  F (36.4  C) (Tympanic)   Resp 16   Ht 1.715 m (5' 7.5\")  "  Wt 67.6 kg (149 lb)   LMP 06/28/2001   SpO2 97%   BMI 22.99 kg/m    Body mass index is 22.99 kg/m .    Physical Exam   GENERAL: alert and no distress  EYES: Eyes grossly normal to inspection, PERRL and conjunctivae and sclerae normal  HENT: ear canals and TM's normal, nose and mouth without ulcers or lesions  NECK: no adenopathy, no asymmetry, masses, or scars  RESP: lungs clear to auscultation - no rales, rhonchi or wheezes  CV: regular rate and rhythm, normal S1 S2, no S3 or S4, no murmur, click or rub, no peripheral edema  ABDOMEN: soft, nontender, no hepatosplenomegaly, no masses and bowel sounds normal  MS: no gross musculoskeletal defects noted, no edema  SKIN: no suspicious lesions or rashes  NEURO: Normal strength and tone, mentation intact and speech normal  PSYCH: mentation appears normal, affect normal/bright    Reviewed labs / years      Signed Electronically by:              Patrick Melgar MD, FAAFP     [facility]  [address] [place] [id number]  [email or web address]   Office: [phone]  Fax: [phone]

## 2024-04-26 ASSESSMENT — ANXIETY QUESTIONNAIRES: GAD7 TOTAL SCORE: 0

## 2024-04-30 NOTE — PROGRESS NOTES
[facility] Integrated Behavioral Health  May 1, 2024      Behavioral Health Clinician Progress Note    Patient Name: Jolynn Haji           Service Type:  Individual      Service Location:   MyChart / Email (patient reached)     Session Start Time: 10:00 am   Session End Time: 10:45 am      Session Length: 38 - 52      Attendees: Patient     Service Modality:  Video Visit:      Provider verified identity through the following two step process.  Patient provided:  Patient is known previously to provider    Telemedicine Visit: The patient's condition can be safely assessed and treated via synchronous audio and visual telemedicine encounter.      Reason for Telemedicine Visit: Patient has requested telehealth visit    Originating Site (Patient Location): Patient's home    Distant Site (Provider Location): [facility]    Consent:  The patient/guardian has verbally consented to: the potential risks and benefits of telemedicine (video visit) versus in person care; bill my insurance or make self-payment for services provided; and responsibility for payment of non-covered services.     Patient would like the video invitation sent by:  My Chart    Mode of Communication:  Video Conference via Amwell    As the provider I attest to compliance with applicable laws and regulations related to telemedicine.    Visit Activities (Refresh list every visit): [facility] Only    Diagnostic Assessment Date: 2/28/24 (updated)  Treatment Plan Review Date: 5/21/24  See Flowsheets for today's PHQ-9 and TIFFANY-7 results  Previous PHQ-9:       3/6/2024    10:18 AM 4/18/2024     9:49 AM 4/25/2024     1:55 PM   PHQ-9 SCORE   PHQ-9 Total Score MyChart 1 (Minimal depression) 0 0   PHQ-9 Total Score 1 0 0     Previous TIFFANY-7:       2/25/2024     1:22 PM 4/23/2024     3:26 PM 4/25/2024     1:56 PM   TIFFANY-7 SCORE   Total Score 3 (minimal anxiety) 2 (minimal anxiety) 0 (minimal anxiety)   Total Score 3  2 0       KARINA LEVEL:      8/27/2010     3:00 PM 2/22/2011    11:00 AM   KARINA Score (Last Two)   KARINA Raw Score 51 49   Activation Score 91.6 82.8   KARINA Level 4 4       DATA  Extended Session (60+ minutes): No  Interactive Complexity: No  Crisis: No  [facility] Patient: No    Treatment Objective(s) Addressed in This Session:  Target Behavior(s):  anxiety and depression    Depressed Mood: Increase interest, engagement, and pleasure in doing things  Decrease frequency and intensity of feeling down, depressed, hopeless  Improve quantity and quality of night time sleep / decrease daytime naps  Feel less tired and more energy during the day   Improve diet, appetite, mindful eating, and / or meal planning  Identify negative self-talk and behaviors: challenge core beliefs, myths, and actions  Improve concentration, focus, and mindfulness in daily activities   Feel less fidgety, restless or slow in daily activities / interpersonal interactions  Anxiety: will experience a reduction in anxiety, will develop more effective coping skills to manage anxiety symptoms, will develop healthy cognitive patterns and beliefs and will increase ability to function adaptively    Current Stressors / Issues:  Working on getting cabin back together again. Discussed working on expectations for self. Discussed attempts to go with the flow in unplanned situations. Brought up concern for leg cramping. Pt questions if it can be anxiety related. Discussed talking further with PCP. Reports overall feeling anxiety has been good. She has been utilizing skills discussed in therapy and feeling she has been mastering some of them.     4/18/24  Reports doing pretty well overall. She has been planning on summer events and spending time with grand children. Discussed  use of lemon drops when anxious. Discussed more recent discussions with others about their own struggles with MH. This has been very normalizing for her. Pt can have a tendency of being hard on self for  the need for therapy and medications. Reports she was able to read some suggested material. She has found several take away's she really enjoys and will put into practice.     4/4/24  Reports trip to [place] was very nice with family. Reports high anxiety leading up to the trip. She really worked on being in the present while on vacation. Reports more headaches while on the trip. Reports difficulty with PCP being on a medical leave. Discussed prospective change as changes can be challenging for her. Discussed several reading options to look at perspective changing and managing uncomfortable situations.        Progress on Treatment Objective(s) / Homework:  Satisfactory progress - ACTION (Actively working towards change); Intervened by reinforcing change plan / affirming steps taken    Motivational Interviewing    MI Intervention: Expressed Empathy/Understanding, Supported Autonomy, Collaboration, Evocation, Open-ended questions, Change talk (evoked) and Reframe     Change Talk Expressed by the Patient: Desire to change Ability to change Reasons to change Need to change    Provider Response to Change Talk: E - Evoked more info from patient about behavior change, A - Affirmed patient's thoughts, decisions, or attempts at behavior change, R - Reflected patient's change talk and S - Summarized patient's change talk statements      Care Plan review completed: Yes    Medication Review:  No changes to current psychiatric medication(s)    Medication Compliance:  Yes    Changes in Health Issues:   None reported    Chemical Use Review:   Substance Use: Chemical use reviewed, no active concerns identified      Tobacco Use: No current tobacco use.      Assessment: Current Emotional / Mental Status (status of significant symptoms):  Risk status (Self / Other harm or suicidal ideation)  Patient denies a history of suicidal ideation, suicide attempts, self-injurious behavior, homicidal ideation, homicidal behavior and and other safety  concerns  Patient denies current fears or concerns for personal safety.  Patient denies current or recent suicidal ideation or behaviors.  Patient denies current or recent homicidal ideation or behaviors.  Patient denies current or recent self injurious behavior or ideation.  Patient denies other safety concerns.  A safety and risk management plan has not been developed at this time, however patient was encouraged to call [place] [phone] should there be a change in any of these risk factors.    Appearance:   Appropriate   Eye Contact:   Good   Psychomotor Behavior: Normal   Attitude:   Cooperative   Orientation:   All  Speech   Rate / Production: Normal    Volume:  Normal   Mood:    Anxious   Affect:    Appropriate   Thought Content:  Clear   Thought Form:  Coherent  Logical   Insight:    Good     Diagnoses:  1. Generalized anxiety disorder    2. Insomnia, unspecified type              Collateral Reports Completed:  Not Applicable    Plan: (Homework, other):  Patient was given information about behavioral services and encouraged to schedule a follow up appointment with the clinic [facility] in 1 week.  She was also given information about mental health symptoms and treatment options .  CD Recommendations: No indications of CD issues.   Erika Francisco [facility]      ______________________________________________________________________    Integrated Primary Care Behavioral Health Treatment Plan    Patient's Name: Jolynn Haji  YOB: 1955    Date of Creation: 10/5/22  Date Treatment Plan Last Reviewed/Revised: 2/21/24    DSM5 Diagnoses: 300.02 (F41.1) Generalized Anxiety Disorder  Psychosocial / Contextual Factors: Individual Factors high anxiety associated with depressive sx, interfereing with ability to function as she would like.  .  PROMIS (reviewed every 90 days):     Referral / Collaboration:  Was/were discussed and patient will pursue. - individual therapy    Anticipated number of session for this  episode of care: 5-8 sessions  Anticipation frequency of session: Weekly  Anticipated Duration of each session: 38-52 minutes  Treatment plan will be reviewed in 90 days or when goals have been changed.       MeasurableTreatment Goal(s) related to diagnosis / functional impairment(s)  Goal 1: Patient will work with provider to manage symptoms    I will know I've met my goal when when less anxious and feeling down.      Objective #A (Patient Action)    Patient will   attend all sessions .  Status: Continued - Date(s): 2/21/24    Intervention(s)  Therapist will teach  educate patient on treatment options, clarify concerns, work with pt to overcome any resistance to compliance. .    Objective #B  Patient will identify 3 fears / thoughts that contribute to feeling anxious.  Status: Continued - Date(s): 2/21/24    Intervention(s)  Therapist will  educate patient on treatment options, clarify concerns, work with pt to overcome any resistance to compliance. .    Objective #C  Patient will identify 3 initial signs or symptoms of anxiety.  Status: Continued - Date(s): 2/21/24    Intervention(s)  Therapist will  educate patient on treatment options, clarify concerns, work with pt to overcome any resistance to compliance. .        Patient has reviewed and agreed to the above plan.      Erika Francisco [facility]  May 1, 2024      Answers submitted by the patient for this visit:  Patient Health Questionnaire (Submitted on 3/6/2024)  If you checked off any problems, how difficult have these problems made it for you to do your work, take care of things at home, or get along with other people?: Not difficult at all  PHQ9 TOTAL SCORE: 1    Answers submitted by the patient for this visit:  Patient Health Questionnaire (Submitted on 4/18/2024)  If you checked off any problems, how difficult have these problems made it for you to do your work, take care of things at home, or get along with other people?: Not difficult at all  PHQ9 TOTAL  SCORE: 0

## 2024-05-01 ENCOUNTER — VIRTUAL VISIT (OUTPATIENT)
Dept: BEHAVIORAL HEALTH | Facility: CLINIC | Age: 69
End: 2024-05-01
Payer: COMMERCIAL

## 2024-05-01 DIAGNOSIS — F41.1 GENERALIZED ANXIETY DISORDER: Primary | ICD-10-CM

## 2024-05-01 DIAGNOSIS — G47.00 INSOMNIA, UNSPECIFIED TYPE: ICD-10-CM

## 2024-05-01 PROCEDURE — 90834 PSYTX W PT 45 MINUTES: CPT | Mod: 95 | Performed by: SOCIAL WORKER

## 2024-05-10 NOTE — PROGRESS NOTES
[facility] Integrated Behavioral Health  May 13, 2024      Behavioral Health Clinician Progress Note    Patient Name: Jolynn Haji           Service Type:  Individual      Service Location:   MyChart / Email (patient reached)     Session Start Time: 9:55 am   Session End Time: 10:50 am      Session Length: 53 - 60      Attendees: Patient     Service Modality:  Video Visit:      Provider verified identity through the following two step process.  Patient provided:  Patient is known previously to provider    Telemedicine Visit: The patient's condition can be safely assessed and treated via synchronous audio and visual telemedicine encounter.      Reason for Telemedicine Visit: Patient has requested telehealth visit    Originating Site (Patient Location): Patient's home    Distant Site (Provider Location): [facility]    Consent:  The patient/guardian has verbally consented to: the potential risks and benefits of telemedicine (video visit) versus in person care; bill my insurance or make self-payment for services provided; and responsibility for payment of non-covered services.     Patient would like the video invitation sent by:  My Chart    Mode of Communication:  Video Conference via Amwell    As the provider I attest to compliance with applicable laws and regulations related to telemedicine.    Visit Activities (Refresh list every visit): [facility] Only    Diagnostic Assessment Date: 2/28/24 (updated)  Treatment Plan Review Date: 5/21/24  See Flowsheets for today's PHQ-9 and TIFFANY-7 results  Previous PHQ-9:       3/6/2024    10:18 AM 4/18/2024     9:49 AM 4/25/2024     1:55 PM   PHQ-9 SCORE   PHQ-9 Total Score MyChart 1 (Minimal depression) 0 0   PHQ-9 Total Score 1 0 0     Previous TIFFANY-7:       2/25/2024     1:22 PM 4/23/2024     3:26 PM 4/25/2024     1:56 PM   TIFFANY-7 SCORE   Total Score 3 (minimal anxiety) 2 (minimal anxiety) 0 (minimal anxiety)   Total Score 3  2 0       KARINA LEVEL:      8/27/2010     3:00 PM 2/22/2011    11:00 AM   KARINA Score (Last Two)   KARINA Raw Score 51 49   Activation Score 91.6 82.8   KARINA Level 4 4       DATA  Extended Session (60+ minutes): No  Interactive Complexity: No  Crisis: No  [facility] Patient: No    Treatment Objective(s) Addressed in This Session:  Target Behavior(s):  anxiety and depression    Depressed Mood: Increase interest, engagement, and pleasure in doing things  Decrease frequency and intensity of feeling down, depressed, hopeless  Improve quantity and quality of night time sleep / decrease daytime naps  Feel less tired and more energy during the day   Improve diet, appetite, mindful eating, and / or meal planning  Identify negative self-talk and behaviors: challenge core beliefs, myths, and actions  Improve concentration, focus, and mindfulness in daily activities   Feel less fidgety, restless or slow in daily activities / interpersonal interactions  Anxiety: will experience a reduction in anxiety, will develop more effective coping skills to manage anxiety symptoms, will develop healthy cognitive patterns and beliefs and will increase ability to function adaptively    Current Stressors / Issues:  Reports she had a wonderful mothers day with kids. Continues to see her friend who is having mental health struggles. Struggles with her own emotions she has after seeing friend. Processed feelings of frustration and guilt. Discussed nature of present anxiety. Feels she has anxiety daily, normalized her experience. Discussed anxiety more in down times when she has more head space to think about things. She continues engage in skills for anxiety. Continues to have headaches which is frustrating to her. Discussed fears of making med changes. Encouraged her to have conversation further with MD. Discussed skills she has learned and how these can be very beneficial when making medication changes. Continues to weigh pros and cons of changing meds to  hopefully improve headaches.      5/1/24  Working on getting cabin back together again. Discussed working on expectations for self. Discussed attempts to go with the flow in unplanned situations. Brought up concern for leg cramping. Pt questions if it can be anxiety related. Discussed talking further with PCP. Reports overall feeling anxiety has been good. She has been utilizing skills discussed in therapy and feeling she has been mastering some of them.     4/18/24  Reports doing pretty well overall. She has been planning on summer events and spending time with grand children. Discussed  use of lemon drops when anxious. Discussed more recent discussions with others about their own struggles with MH. This has been very normalizing for her. Pt can have a tendency of being hard on self for the need for therapy and medications. Reports she was able to read some suggested material. She has found several take away's she really enjoys and will put into practice.     4/4/24  Reports trip to [place] was very nice with family. Reports high anxiety leading up to the trip. She really worked on being in the present while on vacation. Reports more headaches while on the trip. Reports difficulty with PCP being on a medical leave. Discussed prospective change as changes can be challenging for her. Discussed several reading options to look at perspective changing and managing uncomfortable situations.        Progress on Treatment Objective(s) / Homework:  Satisfactory progress - ACTION (Actively working towards change); Intervened by reinforcing change plan / affirming steps taken    Motivational Interviewing    MI Intervention: Expressed Empathy/Understanding, Supported Autonomy, Collaboration, Evocation, Open-ended questions, Change talk (evoked) and Reframe     Change Talk Expressed by the Patient: Desire to change Ability to change Reasons to change Need to change    Provider Response to Change Talk: E - Evoked more info from  patient about behavior change, A - Affirmed patient's thoughts, decisions, or attempts at behavior change, R - Reflected patient's change talk and S - Summarized patient's change talk statements      Care Plan review completed: Yes    Medication Review:  No changes to current psychiatric medication(s)    Medication Compliance:  Yes    Changes in Health Issues:   None reported    Chemical Use Review:   Substance Use: Chemical use reviewed, no active concerns identified      Tobacco Use: No current tobacco use.      Assessment: Current Emotional / Mental Status (status of significant symptoms):  Risk status (Self / Other harm or suicidal ideation)  Patient denies a history of suicidal ideation, suicide attempts, self-injurious behavior, homicidal ideation, homicidal behavior and and other safety concerns  Patient denies current fears or concerns for personal safety.  Patient denies current or recent suicidal ideation or behaviors.  Patient denies current or recent homicidal ideation or behaviors.  Patient denies current or recent self injurious behavior or ideation.  Patient denies other safety concerns.  A safety and risk management plan has not been developed at this time, however patient was encouraged to call [place] [phone] should there be a change in any of these risk factors.    Appearance:   Appropriate   Eye Contact:   Good   Psychomotor Behavior: Normal   Attitude:   Cooperative   Orientation:   All  Speech   Rate / Production: Normal    Volume:  Normal   Mood:    Anxious   Affect:    Appropriate   Thought Content:  Clear   Thought Form:  Coherent  Logical   Insight:    Good     Diagnoses:  1. Generalized anxiety disorder    2. Insomnia, unspecified type                Collateral Reports Completed:  Not Applicable    Plan: (Homework, other):  Patient was given information about behavioral services and encouraged to schedule a follow up appointment with the clinic [facility] in 1 week.  She was also given  information about mental health symptoms and treatment options .  CD Recommendations: No indications of CD issues.   Erika Francisco MSW LICSW      ______________________________________________________________________    Integrated Primary Care Behavioral Health Treatment Plan    Patient's Name: Jolynn Haji  YOB: 1955    Date of Creation: 10/5/22  Date Treatment Plan Last Reviewed/Revised: 2/21/24    DSM5 Diagnoses: 300.02 (F41.1) Generalized Anxiety Disorder  Psychosocial / Contextual Factors: Individual Factors high anxiety associated with depressive sx, interfereing with ability to function as she would like.  .  PROMIS (reviewed every 90 days):     Referral / Collaboration:  Was/were discussed and patient will pursue. - individual therapy    Anticipated number of session for this episode of care: 5-8 sessions  Anticipation frequency of session: Weekly  Anticipated Duration of each session: 38-52 minutes  Treatment plan will be reviewed in 90 days or when goals have been changed.       MeasurableTreatment Goal(s) related to diagnosis / functional impairment(s)  Goal 1: Patient will work with provider to manage symptoms    I will know I've met my goal when when less anxious and feeling down.      Objective #A (Patient Action)    Patient will   attend all sessions .  Status: Continued - Date(s): 2/21/24    Intervention(s)  Therapist will teach  educate patient on treatment options, clarify concerns, work with pt to overcome any resistance to compliance. .    Objective #B  Patient will identify 3 fears / thoughts that contribute to feeling anxious.  Status: Continued - Date(s): 2/21/24    Intervention(s)  Therapist will  educate patient on treatment options, clarify concerns, work with pt to overcome any resistance to compliance. .    Objective #C  Patient will identify 3 initial signs or symptoms of anxiety.  Status: Continued - Date(s): 2/21/24    Intervention(s)  Therapist will  educate patient on  treatment options, clarify concerns, work with pt to overcome any resistance to compliance. .        Patient has reviewed and agreed to the above plan.      Erika Francisco [facility]  May 13, 2024      Answers submitted by the patient for this visit:  Patient Health Questionnaire (Submitted on 3/6/2024)  If you checked off any problems, how difficult have these problems made it for you to do your work, take care of things at home, or get along with other people?: Not difficult at all  PHQ9 TOTAL SCORE: 1    Answers submitted by the patient for this visit:  Patient Health Questionnaire (Submitted on 4/18/2024)  If you checked off any problems, how difficult have these problems made it for you to do your work, take care of things at home, or get along with other people?: Not difficult at all  PHQ9 TOTAL SCORE: 0

## 2024-05-13 ENCOUNTER — VIRTUAL VISIT (OUTPATIENT)
Dept: BEHAVIORAL HEALTH | Facility: CLINIC | Age: 69
End: 2024-05-13
Payer: COMMERCIAL

## 2024-05-13 DIAGNOSIS — G47.00 INSOMNIA, UNSPECIFIED TYPE: ICD-10-CM

## 2024-05-13 DIAGNOSIS — F41.1 GENERALIZED ANXIETY DISORDER: Primary | ICD-10-CM

## 2024-05-13 PROCEDURE — 90837 PSYTX W PT 60 MINUTES: CPT | Mod: 95 | Performed by: SOCIAL WORKER

## 2024-05-22 NOTE — PROGRESS NOTES
[facility] Integrated Behavioral Health  May 28, 2024      Behavioral Health Clinician Progress Note    Patient Name: Jolynn Haji           Service Type:  Individual      Service Location:   MyChart / Email (patient reached)     Session Start Time: 2:00 pm   Session End Time: 2:46 pm      Session Length: 38 - 52      Attendees: Patient     Service Modality:  Video Visit:      Provider verified identity through the following two step process.  Patient provided:  Patient is known previously to provider    Telemedicine Visit: The patient's condition can be safely assessed and treated via synchronous audio and visual telemedicine encounter.      Reason for Telemedicine Visit: Patient has requested telehealth visit    Originating Site (Patient Location): Patient's home    Distant Site (Provider Location): [facility]    Consent:  The patient/guardian has verbally consented to: the potential risks and benefits of telemedicine (video visit) versus in person care; bill my insurance or make self-payment for services provided; and responsibility for payment of non-covered services.     Patient would like the video invitation sent by:  My Chart    Mode of Communication:  Video Conference via Amwell    As the provider I attest to compliance with applicable laws and regulations related to telemedicine.    Visit Activities (Refresh list every visit): [facility] Only    Diagnostic Assessment Date: 2/28/24 (updated)  Treatment Plan Review Date: 8/28/24  See Flowsheets for today's PHQ-9 and TIFFANY-7 results  Previous PHQ-9:       3/6/2024    10:18 AM 4/18/2024     9:49 AM 4/25/2024     1:55 PM   PHQ-9 SCORE   PHQ-9 Total Score MyChart 1 (Minimal depression) 0 0   PHQ-9 Total Score 1 0 0     Previous TIFFANY-7:       2/25/2024     1:22 PM 4/23/2024     3:26 PM 4/25/2024     1:56 PM   TIFFANY-7 SCORE   Total Score 3 (minimal anxiety) 2 (minimal anxiety) 0 (minimal anxiety)   Total Score 3  2 0       KARINA LEVEL:      8/27/2010     3:00 PM 2/22/2011    11:00 AM   KARINA Score (Last Two)   KARINA Raw Score 51 49   Activation Score 91.6 82.8   KARINA Level 4 4       DATA  Extended Session (60+ minutes): No  Interactive Complexity: No  Crisis: No  [facility] Patient: No    Treatment Objective(s) Addressed in This Session:  Target Behavior(s):  anxiety and depression    Depressed Mood: Increase interest, engagement, and pleasure in doing things  Decrease frequency and intensity of feeling down, depressed, hopeless  Improve quantity and quality of night time sleep / decrease daytime naps  Feel less tired and more energy during the day   Improve diet, appetite, mindful eating, and / or meal planning  Identify negative self-talk and behaviors: challenge core beliefs, myths, and actions  Improve concentration, focus, and mindfulness in daily activities   Feel less fidgety, restless or slow in daily activities / interpersonal interactions  Anxiety: will experience a reduction in anxiety, will develop more effective coping skills to manage anxiety symptoms, will develop healthy cognitive patterns and beliefs and will increase ability to function adaptively    Current Stressors / Issues:  [place] overall. Reports some high anxiety with kids getting into a fight. Was able to manage getting them back together after some conversation. She has not had anxiety like this in a while. Discussed taking time to herself to recover and processing emotions involved. She went to see friend who suffers significantly from MH. Feels the interaction was better than it has been. Continues to approach relationship with empathy as it has been difficult with her friends slow progress.       5/13/24  Reports she had a wonderful mothers day with kids. Continues to see her friend who is having mental health struggles. Struggles with her own emotions she has after seeing friend. Processed  feelings of frustration and guilt. Discussed nature of present anxiety. Feels she has anxiety daily, normalized her experience. Discussed anxiety more in down times when she has more head space to think about things. She continues engage in skills for anxiety. Continues to have headaches which is frustrating to her. Discussed fears of making med changes. Encouraged her to have conversation further with MD. Discussed skills she has learned and how these can be very beneficial when making medication changes. Continues to weigh pros and cons of changing meds to hopefully improve headaches.      Progress on Treatment Objective(s) / Homework:  Satisfactory progress - ACTION (Actively working towards change); Intervened by reinforcing change plan / affirming steps taken    Motivational Interviewing    MI Intervention: Expressed Empathy/Understanding, Supported Autonomy, Collaboration, Evocation, Open-ended questions, Change talk (evoked) and Reframe     Change Talk Expressed by the Patient: Desire to change Ability to change Reasons to change Need to change    Provider Response to Change Talk: E - Evoked more info from patient about behavior change, A - Affirmed patient's thoughts, decisions, or attempts at behavior change, R - Reflected patient's change talk and S - Summarized patient's change talk statements      Care Plan review completed: Yes    Medication Review:  No changes to current psychiatric medication(s)    Medication Compliance:  Yes    Changes in Health Issues:   None reported    Chemical Use Review:   Substance Use: Chemical use reviewed, no active concerns identified      Tobacco Use: No current tobacco use.      Assessment: Current Emotional / Mental Status (status of significant symptoms):  Risk status (Self / Other harm or suicidal ideation)  Patient denies a history of suicidal ideation, suicide attempts, self-injurious behavior, homicidal ideation, homicidal behavior and and other safety  concerns  Patient denies current fears or concerns for personal safety.  Patient denies current or recent suicidal ideation or behaviors.  Patient denies current or recent homicidal ideation or behaviors.  Patient denies current or recent self injurious behavior or ideation.  Patient denies other safety concerns.  A safety and risk management plan has not been developed at this time, however patient was encouraged to call [place] [phone] should there be a change in any of these risk factors.    Appearance:   Appropriate   Eye Contact:   Good   Psychomotor Behavior: Normal   Attitude:   Cooperative   Orientation:   All  Speech   Rate / Production: Normal    Volume:  Normal   Mood:    Anxious   Affect:    Appropriate   Thought Content:  Clear   Thought Form:  Coherent  Logical   Insight:    Good     Diagnoses:  1. Generalized anxiety disorder    2. Insomnia, unspecified type                Collateral Reports Completed:  Not Applicable    Plan: (Homework, other):  Patient was given information about behavioral services and encouraged to schedule a follow up appointment with the clinic [facility] in 1 week.  She was also given information about mental health symptoms and treatment options .  CD Recommendations: No indications of CD issues.   Erika Francisco [facility]      ______________________________________________________________________    Integrated Primary Care Behavioral Health Treatment Plan    Patient's Name: Jolynn Haji  YOB: 1955    Date of Creation: 10/5/22  Date Treatment Plan Last Reviewed/Revised: 5/28/24    DSM5 Diagnoses: 300.02 (F41.1) Generalized Anxiety Disorder  Psychosocial / Contextual Factors: Individual Factors high anxiety associated with depressive sx, interfereing with ability to function as she would like.  .  PROMIS (reviewed every 90 days):     Referral / Collaboration:  Was/were discussed and patient will pursue. - individual therapy    Anticipated number of session for this  episode of care: 5-8 sessions  Anticipation frequency of session: Weekly  Anticipated Duration of each session: 38-52 minutes  Treatment plan will be reviewed in 90 days or when goals have been changed.       MeasurableTreatment Goal(s) related to diagnosis / functional impairment(s)  Goal 1: Patient will work with provider to manage symptoms    I will know I've met my goal when when less anxious and feeling down.      Objective #A (Patient Action)    Patient will   attend all sessions .  Status: Continued - Date(s): 5/28/24    Intervention(s)  Therapist will teach  educate patient on treatment options, clarify concerns, work with pt to overcome any resistance to compliance. .    Objective #B  Patient will identify 3 fears / thoughts that contribute to feeling anxious.  Status: Continued - Date(s): 5/28/24    Intervention(s)  Therapist will  educate patient on treatment options, clarify concerns, work with pt to overcome any resistance to compliance. .    Objective #C  Patient will identify 3 initial signs or symptoms of anxiety.  Status: Continued - Date(s): 5/28/24    Intervention(s)  Therapist will  educate patient on treatment options, clarify concerns, work with pt to overcome any resistance to compliance. .        Patient has reviewed and agreed to the above plan.      Erika Francisco [facility]  May 28, 2024      Answers submitted by the patient for this visit:  Patient Health Questionnaire (Submitted on 3/6/2024)  If you checked off any problems, how difficult have these problems made it for you to do your work, take care of things at home, or get along with other people?: Not difficult at all  PHQ9 TOTAL SCORE: 1    Answers submitted by the patient for this visit:  Patient Health Questionnaire (Submitted on 4/18/2024)  If you checked off any problems, how difficult have these problems made it for you to do your work, take care of things at home, or get along with other people?: Not difficult at all  PHQ9 TOTAL  SCORE: 0    Answers submitted by the patient for this visit:  Patient Health Questionnaire (Submitted on 5/27/2024)  PHQ9 TOTAL SCORE: 0

## 2024-05-27 ASSESSMENT — PATIENT HEALTH QUESTIONNAIRE - PHQ9
SUM OF ALL RESPONSES TO PHQ QUESTIONS 1-9: 0
SUM OF ALL RESPONSES TO PHQ QUESTIONS 1-9: 0

## 2024-05-28 ENCOUNTER — VIRTUAL VISIT (OUTPATIENT)
Dept: BEHAVIORAL HEALTH | Facility: CLINIC | Age: 69
End: 2024-05-28
Payer: COMMERCIAL

## 2024-05-28 DIAGNOSIS — F41.1 GENERALIZED ANXIETY DISORDER: Primary | ICD-10-CM

## 2024-05-28 DIAGNOSIS — G47.00 INSOMNIA, UNSPECIFIED TYPE: ICD-10-CM

## 2024-05-28 PROCEDURE — 90834 PSYTX W PT 45 MINUTES: CPT | Mod: 95 | Performed by: SOCIAL WORKER

## 2024-06-11 ENCOUNTER — VIRTUAL VISIT (OUTPATIENT)
Dept: BEHAVIORAL HEALTH | Facility: CLINIC | Age: 69
End: 2024-06-11
Payer: COMMERCIAL

## 2024-06-11 DIAGNOSIS — F41.1 GENERALIZED ANXIETY DISORDER: Primary | ICD-10-CM

## 2024-06-11 PROCEDURE — 90834 PSYTX W PT 45 MINUTES: CPT | Mod: 95 | Performed by: COUNSELOR

## 2024-06-11 ASSESSMENT — PATIENT HEALTH QUESTIONNAIRE - PHQ9
SUM OF ALL RESPONSES TO PHQ QUESTIONS 1-9: 2
10. IF YOU CHECKED OFF ANY PROBLEMS, HOW DIFFICULT HAVE THESE PROBLEMS MADE IT FOR YOU TO DO YOUR WORK, TAKE CARE OF THINGS AT HOME, OR GET ALONG WITH OTHER PEOPLE: NOT DIFFICULT AT ALL
SUM OF ALL RESPONSES TO PHQ QUESTIONS 1-9: 2

## 2024-06-11 NOTE — PROGRESS NOTES
[facility]  June 11, 2024      Behavioral Health Clinician Progress Note    Patient Name: Jolynn Haji           Service Type:  Individual      Service Location:   MyChart / Email (patient reached)     Session Start Time: 1025am   Session End Time: 1107am      Session Length: 38 - 52      Attendees: Patient     Service Modality:  Video Visit:      Provider verified identity through the following two step process.  Patient provided:  Patient is known previously to provider    Telemedicine Visit: The patient's condition can be safely assessed and treated via synchronous audio and visual telemedicine encounter.      Reason for Telemedicine Visit: Patient has requested telehealth visit    Originating Site (Patient Location): Patient's home    Distant Site (Provider Location): Provider Remote Setting- Home Office    Consent:  The patient/guardian has verbally consented to: the potential risks and benefits of telemedicine (video visit) versus in person care; bill my insurance or make self-payment for services provided; and responsibility for payment of non-covered services.     Patient would like the video invitation sent by:  My Chart    Mode of Communication:  Video Conference via Amwell    As the provider I attest to compliance with applicable laws and regulations related to telemedicine.    Visit Activities (Refresh list every visit): [facility] Only    Diagnostic Assessment Date: 2/28/24 (updated)  Treatment Plan Review Date: 8/28/24  See Flowsheets for today's PHQ-9 and TIFFANY-7 results  Previous PHQ-9:       4/18/2024     9:49 AM 4/25/2024     1:55 PM 5/27/2024     6:57 PM   PHQ-9 SCORE   PHQ-9 Total Score MyChart 0 0 0   PHQ-9 Total Score 0 0 0     Previous TIFFANY-7:       2/25/2024     1:22 PM 4/23/2024     3:26 PM 4/25/2024     1:56 PM   TIFFANY-7 SCORE   Total Score 3 (minimal anxiety) 2 (minimal anxiety) 0 (minimal anxiety)   Total Score 3 2 0       KARINA LEVEL:       8/27/2010     3:00 PM 2/22/2011    11:00 AM   KARINA Score (Last Two)   KARINA Raw Score 51 49   Activation Score 91.6 82.8   KARINA Level 4 4       DATA  Extended Session (60+ minutes): No  Interactive Complexity: No  Crisis: No  [facility] Patient: No    Treatment Objective(s) Addressed in This Session:  Target Behavior(s):  anxiety and depression    Depressed Mood: Decrease frequency and intensity of feeling down, depressed, hopeless  Identify negative self-talk and behaviors: challenge core beliefs, myths, and actions  Anxiety: will experience a reduction in anxiety, will develop more effective coping skills to manage anxiety symptoms, will develop healthy cognitive patterns and beliefs, and will increase ability to function adaptively    Current Stressors / Issues:  6/11/2024  Pt says that things have gone well. She checked in and started the visit she said that she didn't finish the questions and she was able to manage this. When pt has anxiety the first thing they want to do is go outside. 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 Pt has read a lot of books Erika provided to pt. She says that her and her PCP agree that there is a bit more to get back to where she was. Head aches are regular for pt.     Pt says that checking in with pt to see how they are doing would be helpful for future visits.       5/28/2024  [place] which was [place] overall. Reports some high anxiety with kids getting into a fight. Was able to manage getting them back together after some conversation. She has not had anxiety like this in a while. Discussed taking time to herself to recover and processing emotions involved. She went to see friend who suffers significantly from MH. Feels the interaction was better than it has been. Continues to approach relationship with empathy as it has been difficult with her friends slow progress.       5/13/24  Reports she had a wonderful mothers day with kids. Continues to  see her friend who is having mental health struggles. Struggles with her own emotions she has after seeing friend. Processed feelings of frustration and guilt. Discussed nature of present anxiety. Feels she has anxiety daily, normalized her experience. Discussed anxiety more in down times when she has more head space to think about things. She continues engage in skills for anxiety. Continues to have headaches which is frustrating to her. Discussed fears of making med changes. Encouraged her to have conversation further with MD. Discussed skills she has learned and how these can be very beneficial when making medication changes. Continues to weigh pros and cons of changing meds to hopefully improve headaches.      Progress on Treatment Objective(s) / Homework:  Satisfactory progress - ACTION (Actively working towards change); Intervened by reinforcing change plan / affirming steps taken    CBT: Patient states that she has learned a lot of different skills and read a lot of different books that Erika has taught and provided to her.  She states that she is able to notice warning signs and triggers that anxiety is present.  Patient expresses that while she is out walking that she feels at peace and very calm.  Patient is open to learning a lot of different skills that [facility] is able to provide.    [facility] explores with patient what may be helpful for future visits while this [facility] is covering for Erika while she is out of the office.  [facility] provides a lot of psychoeducation about anxiety and nature and a lot of different tools and techniques to help manage anxiety (art, walking, mindfulness, digging in the dirt).  [facility] will provide these different tools for MyChart.    Motivational Interviewing    MI Intervention: Expressed Empathy/Understanding, Supported Autonomy, Collaboration, Evocation, Open-ended questions, Change talk (evoked) and Reframe     Change Talk Expressed by the Patient: Desire to change Ability to change Reasons to  change Need to change    Provider Response to Change Talk: E - Evoked more info from patient about behavior change, A - Affirmed patient's thoughts, decisions, or attempts at behavior change, R - Reflected patient's change talk and S - Summarized patient's change talk statements      Care Plan review completed: Yes    Medication Review:  No changes to current psychiatric medication(s)    Medication Compliance:  Yes    Changes in Health Issues:   None reported    Chemical Use Review:   Substance Use: Chemical use reviewed, no active concerns identified      Tobacco Use: No current tobacco use.      Assessment: Current Emotional / Mental Status (status of significant symptoms):  Risk status (Self / Other harm or suicidal ideation)  Patient denies a history of suicidal ideation, suicide attempts, self-injurious behavior, homicidal ideation, homicidal behavior and and other safety concerns  Patient denies current fears or concerns for personal safety.  Patient denies current or recent suicidal ideation or behaviors.  Patient denies current or recent homicidal ideation or behaviors.  Patient denies current or recent self injurious behavior or ideation.  Patient denies other safety concerns.  A safety and risk management plan has not been developed at this time, however patient was encouraged to call [place] [phone] should there be a change in any of these risk factors.    Appearance:   Appropriate , well groomed  Eye Contact:   Good   Psychomotor Behavior: Normal , seated  Attitude:   Cooperative   Orientation:   All  Speech   Rate / Production: Normal    Volume:  Normal   Mood:    Anxious   Affect:    Appropriate   Thought Content:  Clear   Thought Form:  Coherent  Logical   Insight:    Good     Diagnoses:  1. Generalized anxiety disorder        Collateral Reports Completed:  Not Applicable    Plan: (Homework, other):  Patient was given information about behavioral services and encouraged to schedule a follow up  appointment with the clinic [facility]  in 4 weeks .  She was also given information about mental health symptoms and treatment options .  CD Recommendations: No indications of CD issues.  Gina Freeman, DINESH, [facility]      ______________________________________________________________________    Integrated Primary Care Behavioral Health Treatment Plan    Patient's Name: Jolynn Haji  YOB: 1955    Date of Creation: 10/5/22  Date Treatment Plan Last Reviewed/Revised: 5/28/24    DSM5 Diagnoses: 300.02 (F41.1) Generalized Anxiety Disorder  Psychosocial / Contextual Factors: Individual Factors high anxiety associated with depressive sx, interfereing with ability to function as she would like.  .  PROMIS (reviewed every 90 days):     Referral / Collaboration:  Was/were discussed and patient will pursue. - individual therapy    Anticipated number of session for this episode of care: 5-8 sessions  Anticipation frequency of session: Weekly  Anticipated Duration of each session: 38-52 minutes  Treatment plan will be reviewed in 90 days or when goals have been changed.       MeasurableTreatment Goal(s) related to diagnosis / functional impairment(s)  Goal 1: Patient will work with provider to manage symptoms    I will know I've met my goal when when less anxious and feeling down.      Objective #A (Patient Action)    Patient will   attend all sessions .  Status: Continued - Date(s): 5/28/24    Intervention(s)  Therapist will teach  educate patient on treatment options, clarify concerns, work with pt to overcome any resistance to compliance. .    Objective #B  Patient will identify 3 fears / thoughts that contribute to feeling anxious.  Status: Continued - Date(s): 5/28/24    Intervention(s)  Therapist will  educate patient on treatment options, clarify concerns, work with pt to overcome any resistance to compliance. .    Objective #C  Patient will identify 3 initial signs or symptoms of anxiety.  Status: Continued -  Date(s): 5/28/24    Intervention(s)  Therapist will  educate patient on treatment options, clarify concerns, work with pt to overcome any resistance to compliance. .        Patient has reviewed and agreed to the above plan.      Erika STARK [facility]  May 28, 2024

## 2024-06-21 NOTE — PROGRESS NOTES
[facility]  June 26, 2024      Behavioral Health Clinician Progress Note    Patient Name: Jolynn Haji           Service Type:  Individual      Service Location:   [facility] / Email (patient reached)     Session Start Time: 10am  Session End Time: 1034am      Session Length: 16 - 37      Attendees: Client     Service Modality:  Video Visit:      Provider verified identity through the following two step process.  Patient provided:  Patient is known previously to provider    Telemedicine Visit: The patient's condition can be safely assessed and treated via synchronous audio and visual telemedicine encounter.      Reason for Telemedicine Visit: Services only offered telehealth    Originating Site (Patient Location): Patient's home    Distant Site (Provider Location): Provider Remote Setting- Home Office    Consent:  The patient/guardian has verbally consented to: the potential risks and benefits of telemedicine (video visit) versus in person care; bill my insurance or make self-payment for services provided; and responsibility for payment of non-covered services.     Patient would like the video invitation sent by:  My Chart    Mode of Communication:  Video Conference via [employer]    Distant Location (Provider):  Off-site    As the provider I attest to compliance with applicable laws and regulations related to telemedicine.    Visit Activities (Refresh list every visit): [facility] Only    Diagnostic Assessment Date: 2/28/24 Mica George MA [facility]  Treatment Plan Review Date: 6/26/24  See Flowsheets for today's PHQ-9 and TIFFANY-7 results  Previous PHQ-9:       5/27/2024     6:57 PM 6/11/2024    10:14 AM 6/26/2024     9:39 AM   PHQ-9 SCORE   PHQ-9 Total Score MyChart 0 2 (Minimal depression) 1 (Minimal depression)   PHQ-9 Total Score 0 2 1     Previous TIFFANY-7:       2/25/2024     1:22 PM 4/23/2024     3:26 PM 4/25/2024     1:56 PM   TIFFANY-7 SCORE   Total Score 3 (minimal anxiety) 2  (minimal anxiety) 0 (minimal anxiety)   Total Score 3 2 0     DATA  Extended Session (60+ minutes): No  Interactive Complexity: No  Crisis: No  [facility] Patient: No    Treatment Objective(s) Addressed in This Session:  Target Behavior(s): disease management/lifestyle changes reducing anxiety    Anxiety: will experience a reduction in anxiety, will develop more effective coping skills to manage anxiety symptoms, and will develop healthy cognitive patterns and beliefs    Current Stressors / Issues:  MH update:  Going really well.  Have on going headaches.  I feels manageable however.  Way less brain fog.    Stresses:  Had to put in new furnaces and air conditioners.   Appetite: N/a   Sleep:  N/a  Outpatient Provider updates: Erika Mcmahon leave temporarily Nathalie MONTIEL [facility]  SI/SIB/HI: N/a  FARIDA: Denies  Side effects/compliance: N/a  Interventions:  [facility] discussed anxiety mgmt and TIPP skills/decision paralysis.   Most important:  Doing well!    Warning signs: not energy, not being social, not having odessa    2/28   update:  Reduced effexor since last visit.  On going well.  Brain fog gone.  Stresses:  Headache pain is reduced.  Appetite: Denies  Sleep: Trazadone works, no problem  Outpatient Provider updates: [facility] Erika OCHOA  Uses CALM lorena daily with afternoon anxiety  SI/SIB/HI:  Denies past attempts.  Previous passive ideation  FARIDA:  Denies  Side effects/compliance:  N/a  Interventions:  [facility] engaged in completing DA with psychoeducation with treatment planning  Most important: Med changes are great.    Does homework and books to read.    Uses CALM lorena  5/4/3/2/1  May I skills  Journaling  Putting thoughts in box, for another day  Word lorena  Making choices can be difficult sets limits      Progress on Treatment Objective(s) / Homework:  New Objective established this session - CONTEMPLATION (Considering change and yet undecided); Intervened by assessing the negative and positive thinking (ambivalence) about behavior  change    Motivational Interviewing    MI Intervention: Co-Developed Goal: reducing anxiety and Expressed Empathy/Understanding     Change Talk Expressed by the Patient: Desire to change Ability to change    Provider Response to Change Talk: E - Evoked more info from patient about behavior change and A - Affirmed patient's thoughts, decisions, or attempts at behavior change    Also provided psychoeducation about behavioral health condition, symptoms, and treatment options    Assessments completed prior to visit:  The following assessments were completed by patient for this visit:  PHQ2:       5/3/2023     1:18 PM 3/21/2023    11:20 AM 2/23/2023     7:15 PM 1/31/2023     3:21 PM 1/12/2023     4:28 PM 12/15/2022     9:12 PM 12/8/2022     9:38 AM   PHQ-2 ( 1999 [employer])   Q1: Little interest or pleasure in doing things   0 0 0  0   Q2: Feeling down, depressed or hopeless   0 0 0  0   PHQ-2 Score   0 0 0  0   Q1: Little interest or pleasure in doing things   Not at all Not at all Not at all  Not at all   Q2: Feeling down, depressed or hopeless   Not at all Not at all Not at all  Not at all   PHQ-2 Score Incomplete Incomplete 0 0 0 Incomplete 0     GAD2:       1/8/2024    11:48 AM 2/18/2024    12:57 PM 2/25/2024     2:08 PM 4/2/2024     8:20 PM 5/9/2024     6:47 PM 6/9/2024     8:47 PM 6/23/2024     7:59 PM   TIFFANY-2   Feeling nervous, anxious, or on edge 1 1 2 1 1 1 1   Not being able to stop or control worrying 0 0 0 0 0 0 0   TIFFANY-2 Total Score 1 1 2 1 1 1 1    1       Care Plan review completed: Yes    Medication Review:  Changes to psychiatric medications, see updated Medication List in EPIC.     Medication Compliance:  Yes    Changes in Health Issues:   None reported    Chemical Use Review:   Substance Use: Chemical use reviewed, no active concerns identified      Tobacco Use: No current tobacco use.      Assessment: Current Emotional / Mental Status (status of significant symptoms):  Risk status (Self / Other harm or  suicidal ideation)  Patient denies a history of suicidal ideation, suicide attempts, self-injurious behavior, homicidal ideation, homicidal behavior, and and other safety concerns  Patient denies current fears or concerns for personal safety.  Patient denies current or recent suicidal ideation or behaviors.  Patient denies current or recent homicidal ideation or behaviors.  Patient denies current or recent self injurious behavior or ideation.  Patient denies other safety concerns.  A safety and risk management plan has not been developed at this time, however patient was encouraged to call [place] [phone] should there be a change in any of these risk factors.    Appearance:   Appropriate   Eye Contact:   Good   Psychomotor Behavior: Normal   Attitude:   Cooperative   Orientation:   All  Speech   Rate / Production: Normal    Volume:  Normal   Mood:    Normal  Affect:    Appropriate   Thought Content:  Clear   Thought Form:  Coherent  Logical   Insight:    Good     Diagnoses:  1. Generalized anxiety disorder        Collateral Reports Completed:  Communicated with: Dr Pompa    Plan: (Homework, other):  Patient was given information about behavioral services and encouraged to schedule a follow up appointment with the clinic [facility] as needed.  She was also given information about mental health symptoms and treatment options .  CD Recommendations: No indications of CD issues.       Mica George, [facility]    ______________________________________________________________________    Integrated Primary Care Behavioral Health Treatment Plan    Patient's Name: Jolynn Haji  YOB: 1955    Date of Creation: 6/26/24  Date Treatment Plan Last Reviewed/Revised: 6/26/24    DSM5 Diagnoses: 300.02 (F41.1) Generalized Anxiety Disorder  Psychosocial / Contextual Factors: interpersonal   PROMIS (reviewed every 90 days):    PROMIS-10 Scores      10/8/2023     6:00 PM 1/8/2024    11:52 AM 1/15/2024     7:25 PM 2/25/2024     2:04  PM 5/27/2024     7:01 PM 6/9/2024     8:48 PM 6/23/2024     8:01 PM   PROMIS-10 Scores Only   Global Mental Health Score 17 17 17 17 17 17 17    17   Global Physical Health Score 16 19 16 17 19 19 19    19   PROMIS TOTAL - SUBSCORES 33 36 33 34 36 36 36    36         Referral / Collaboration:  Referral to another professional/service is not indicated at this time..    Anticipated number of session for this episode of care: 5-6  Anticipation frequency of session: Monthly  Anticipated Duration of each session: 16-37 minutes  Treatment plan will be reviewed in 90 days or when goals have been changed.       MeasurableTreatment Goal(s) related to diagnosis / functional impairment(s)  Goal 1: Patient will reduce sx of anxiety    I will know I've met my goal when I can stay in the moment and not worry.      Objective #A (Patient Action)    Patient will use thought-stopping strategy daily to reduce intrusive thoughts.  Status: New - Date: 6/26/24      Intervention(s)  Therapist will provide support through CBT, MI, Acceptance and Commitment Therapy, Dialectic Behavioral Therapy and problem solving model to explore and overcome barriers.        Patient has reviewed and agreed to the above plan.      NARESH Mena  June 26, 2024

## 2024-06-26 ENCOUNTER — VIRTUAL VISIT (OUTPATIENT)
Dept: PSYCHIATRY | Facility: CLINIC | Age: 69
End: 2024-06-26
Payer: COMMERCIAL

## 2024-06-26 ENCOUNTER — VIRTUAL VISIT (OUTPATIENT)
Dept: BEHAVIORAL HEALTH | Facility: CLINIC | Age: 69
End: 2024-06-26
Payer: COMMERCIAL

## 2024-06-26 DIAGNOSIS — F41.1 GENERALIZED ANXIETY DISORDER: Primary | ICD-10-CM

## 2024-06-26 DIAGNOSIS — G47.00 INSOMNIA, UNSPECIFIED TYPE: ICD-10-CM

## 2024-06-26 DIAGNOSIS — F41.1 GAD (GENERALIZED ANXIETY DISORDER): Primary | ICD-10-CM

## 2024-06-26 PROCEDURE — 90832 PSYTX W PT 30 MINUTES: CPT | Mod: 95 | Performed by: COUNSELOR

## 2024-06-26 PROCEDURE — 99214 OFFICE O/P EST MOD 30 MIN: CPT | Mod: 95 | Performed by: PSYCHIATRY & NEUROLOGY

## 2024-06-26 PROCEDURE — G2211 COMPLEX E/M VISIT ADD ON: HCPCS | Mod: 95 | Performed by: PSYCHIATRY & NEUROLOGY

## 2024-06-26 RX ORDER — TRAZODONE HYDROCHLORIDE 50 MG/1
25-50 TABLET, FILM COATED ORAL
Qty: 90 TABLET | Refills: 3 | Status: SHIPPED | OUTPATIENT
Start: 2024-06-26 | End: 2024-09-26

## 2024-06-26 RX ORDER — VENLAFAXINE HYDROCHLORIDE 75 MG/1
75 CAPSULE, EXTENDED RELEASE ORAL DAILY
Qty: 90 CAPSULE | Refills: 3 | Status: SHIPPED | OUTPATIENT
Start: 2024-06-26

## 2024-06-26 ASSESSMENT — PAIN SCALES - GENERAL: PAINLEVEL: NO PAIN (0)

## 2024-06-26 NOTE — NURSING NOTE
Is the patient currently in the state of [place]? YES    Visit mode:VIDEO    If the visit is dropped, the patient can be reconnected by: VIDEO VISIT: Send to e-mail at: [email or web address]    Will anyone else be joining the visit? NO  (If patient encounters technical issues they should call [phone]    How would you like to obtain your AVS? MyChart    Are changes needed to the allergy or medication list? Pt stated no changes to allergies and Pt stated no med changes    Are refills needed on medications prescribed by this physician? NO    Reason for visit: SHANNAN QUEEN

## 2024-06-26 NOTE — PROGRESS NOTES
"Telemedicine Visit: The patient's condition can be safely assessed and treated via synchronous audio and visual telemedicine encounter.      Reason for Telemedicine Visit: Patient has requested telehealth visit    Originating Site (Patient Location): Patient's home    Distant Location (provider location): Off-Site    Consent:  The patient/guardian has verbally consented to: the potential risks and benefits of telemedicine (video visit) versus in person care; bill my insurance or make self-payment for services provided; and responsibility for payment of non-covered services.     Mode of Communication:  Video Conference via Veruta    As the provider I attest to compliance with applicable laws and regulations related to telemedicine.        Outpatient Psychiatric Progress Note    Name: Jolynn Haji   : 1955                    Primary Care Provider: Helga Ibarra MD   Therapist: Working with DINESH Whipple (therapist may be out early May- maybe out for 3 months)    PHQ-9 scores:      2024     6:57 PM 2024    10:14 AM 2024     9:39 AM   PHQ-9 SCORE   PHQ-9 Total Score MyChart 0 2 (Minimal depression) 1 (Minimal depression)   PHQ-9 Total Score 0 2 1       TIFFANY-7 scores:      2024     1:22 PM 2024     3:26 PM 2024     1:56 PM   TIFFANY-7 SCORE   Total Score 3 (minimal anxiety) 2 (minimal anxiety) 0 (minimal anxiety)   Total Score 3 2 0       Patient Identification:  Patient is a 68 year old,   White Choose not to answer female  who presents for return visit with me.  Patient is currently retired. Patient attended the phone/video session alone today. Patient prefers to be called: \" Azalea\".    Interim History:  I last saw Jolynn Haji for outpatient psychiatry return visit on 2024. During that appointment, we:  Continue trazodone 25-50 mg at bedtime as needed for sleep  Continue Effexor-XR/venlafaxine ER 75 mg daily for anxiety.  Continue propranolol EXTENDED " "RELEASE 60 mg ONCE daily for anxiety, tremor, headaches.  Continue all other cares per primary care provider.     6/26: Patient overall doing quite well.  Has continued to remain engaged with family members and activities since last visit.  Feels like brain fog is much improved.  Some dizziness.  She is continuing to monitor her blood pressures.  Taking medication as prescribed.  No acute safety concerns.  No SI.  No problematic drug or alcohol use.    Per [facility], M[facility], during today's team-based visit:  Current Stressors / Issues:   update:  Going really well.  Have on going headaches.  I feels manageable however.  Way less brain fog.    Stresses:  Had to put in new furnaces and air conditioners.   Appetite: N/a   Sleep:  N/a  Outpatient Provider updates: Erika Mcmahon leave temporarily Nathalie MONTIEL [facility]  SI/SIB/HI: N/a  FARIDA: Denies  Side effects/compliance: N/a  Interventions:  [facility] discussed anxiety mgmt and TIPP skills/decision paralysis.   Most important:  Doing well!    Past Psychiatric Med Trials:  Psych Meds at Intake:  lexapro 20 mg daily  abilify 5 mg daily      Past Psych Meds:  Amitriptyline  Quetiapine  Mirtazapine - stopped \"because I was sleeping through the night;\" hallucinations     Psychiatric ROS:  Jolynn Haji reports mood has been: Stable  Anxiety has been: Manageable  Sleep has been: Stable, trazodone working well  Seema sxs: None  Psychosis sxs: N/A  ADHD/ADD sxs: N/A  PTSD sxs: N/A  PHQ9 and GAD7 scores were reviewed today if completed.   Medication side effects: headaches? Dizziness?  Current stressors include: Symptoms and See HPI above  Coping mechanisms and supports include: Family, Hobbies and Friends, therapy    Current medications include:   Current Outpatient Medications   Medication Sig Dispense Refill    clindamycin (CLEOCIN T) 1 % external lotion Apply topically 2 times daily 60 mL 3    lactobacillus rhamnosus, GG, (CULTURELL) capsule Take 1 capsule by mouth daily Taking " every other day      lisinopril (ZESTRIL) 2.5 MG tablet Take 1 tablet (2.5 mg) by mouth daily 90 tablet 3    propranolol ER (INDERAL LA) 60 MG 24 hr capsule Take 1 capsule (60 mg) by mouth daily 90 capsule 3    traZODone (DESYREL) 50 MG tablet Take 0.5-1 tablets (25-50 mg) by mouth nightly as needed for sleep 90 tablet 3    venlafaxine (EFFEXOR XR) 75 MG 24 hr capsule Take 1 capsule (75 mg) by mouth daily 90 capsule 3     Current Facility-Administered Medications   Medication Dose Route Frequency Provider Last Rate Last Admin    lidocaine 1 % 4 mL  4 mL INTRA-ARTICULAR Once Helga Ibarra MD        lidocaine 1 % injection 3 mL  3 mL   Giovany Quiroz MD   3 mL at 07/01/22 1120    lidocaine 1 % injection 4 mL  4 mL   Giovany Quiroz MD   4 mL at 07/01/22 1120    triamcinolone (KENALOG-40) injection 40 mg  40 mg INTRA-ARTICULAR Once Helga Ibarra MD           Past Medical/Surgical History:  Past Medical History:   Diagnosis Date    Abnormal glandular Papanicolaou smear of cervix- ASCUS in 2000 12/5/2003    ASC H- 2000    Basal cell carcinoma of leg, right 05/2016    Dr Rogers    Colon polyps 05/2018    tubular adenoam - due 5 yrs - 2 mm    Concussion without loss of consciousness 4/12/2017    Hidradenitis     left  groin    HSIL (high grade squamous intraepithelial lesion) on Pap smear of cervix 7/5/2000    Normal paps from 2001 to 2016/cc    Hypertension goal BP (blood pressure) < 140/90     OA (osteoarthritis) of knee     moderate    Postmenopausal since 7/2008     Shingles 7/23/2010    left     Squamous cell carcinoma of skin, unspecified     Synovial cyst of popliteal space       has a past medical history of Abnormal glandular Papanicolaou smear of cervix- ASCUS in 2000 (12/5/2003), Basal cell carcinoma of leg, right (05/2016), Colon polyps (05/2018), Concussion without loss of consciousness (4/12/2017), Hidradenitis, HSIL (high grade squamous intraepithelial lesion) on Pap smear of  cervix (7/5/2000), Hypertension goal BP (blood pressure) < 140/90, OA (osteoarthritis) of knee, Postmenopausal (since 7/2008 ), Shingles (7/23/2010), Squamous cell carcinoma of skin, unspecified, and Synovial cyst of popliteal space.    She has no past medical history of Malignant melanoma (H) or Squamous cell carcinoma.    Social History:  Reviewed. No changes to social history except as noted above in HPI.    Vital Signs:   None. This is phone/video visit.     Labs:  Most recent laboratory results reviewed and the pertinent results include:   TSH   Date Value Ref Range Status   02/05/2024 1.26 0.30 - 4.20 uIU/mL Final   04/25/2022 1.30 0.40 - 4.00 mU/L Final   01/13/2021 2.07 0.40 - 4.00 mU/L Final     Lab Results   Component Value Date    WBC 8.1 04/25/2022    WBC 6.4 01/13/2021     Lab Results   Component Value Date    RBC 4.58 04/25/2022    RBC 4.54 01/13/2021     Lab Results   Component Value Date    HGB 13.6 04/25/2022    HGB 13.3 01/13/2021     Lab Results   Component Value Date    HCT 41.1 04/25/2022    HCT 41.7 01/13/2021     No components found for: MCT  Lab Results   Component Value Date    MCV 90 04/25/2022    MCV 92 01/13/2021     Lab Results   Component Value Date    MCH 29.7 04/25/2022    MCH 29.3 01/13/2021     Lab Results   Component Value Date    MCHC 33.1 04/25/2022    MCHC 31.9 01/13/2021     Lab Results   Component Value Date    RDW 12.9 04/25/2022    RDW 13.1 01/13/2021     Lab Results   Component Value Date     04/25/2022     01/13/2021     Last Comprehensive Metabolic Panel:  Sodium   Date Value Ref Range Status   02/05/2024 138 135 - 145 mmol/L Final     Comment:     Reference intervals for this test were updated on 09/26/2023 to more accurately reflect our healthy population. There may be differences in the flagging of prior results with similar values performed with this method. Interpretation of those prior results can be made in the context of the updated reference  intervals.    01/13/2021 140 133 - 144 mmol/L Final     Potassium   Date Value Ref Range Status   02/05/2024 4.7 3.4 - 5.3 mmol/L Final   04/25/2022 4.4 3.4 - 5.3 mmol/L Final   01/13/2021 4.1 3.4 - 5.3 mmol/L Final     Chloride   Date Value Ref Range Status   02/05/2024 102 98 - 107 mmol/L Final   04/25/2022 105 94 - 109 mmol/L Final   01/13/2021 108 94 - 109 mmol/L Final     Carbon Dioxide   Date Value Ref Range Status   01/13/2021 28 20 - 32 mmol/L Final     Carbon Dioxide (CO2)   Date Value Ref Range Status   02/05/2024 27 22 - 29 mmol/L Final   04/25/2022 26 20 - 32 mmol/L Final     Anion Gap   Date Value Ref Range Status   02/05/2024 9 7 - 15 mmol/L Final   04/25/2022 6 3 - 14 mmol/L Final   01/13/2021 4 3 - 14 mmol/L Final     Glucose   Date Value Ref Range Status   02/05/2024 92 70 - 99 mg/dL Final   04/25/2022 104 (H) 70 - 99 mg/dL Final   01/13/2021 91 70 - 99 mg/dL Final     Comment:     Fasting specimen     Urea Nitrogen   Date Value Ref Range Status   02/05/2024 17.4 8.0 - 23.0 mg/dL Final   04/25/2022 23 7 - 30 mg/dL Final   01/13/2021 18 7 - 30 mg/dL Final     Creatinine   Date Value Ref Range Status   02/05/2024 0.68 0.51 - 0.95 mg/dL Final   01/13/2021 0.68 0.52 - 1.04 mg/dL Final     GFR Estimate   Date Value Ref Range Status   02/05/2024 >90 >60 mL/min/1.73m2 Final   01/13/2021 >90 >60 mL/min/[1.73_m2] Final     Comment:     Non  GFR Calc  Starting 12/18/2018, serum creatinine based estimated GFR (eGFR) will be   calculated using the Chronic Kidney Disease Epidemiology Collaboration   (CKD-EPI) equation.       Calcium   Date Value Ref Range Status   02/05/2024 9.9 8.8 - 10.2 mg/dL Final   01/13/2021 8.8 8.5 - 10.1 mg/dL Final     Bilirubin Total   Date Value Ref Range Status   02/05/2024 0.7 <=1.2 mg/dL Final   01/13/2021 0.8 0.2 - 1.3 mg/dL Final     Alkaline Phosphatase   Date Value Ref Range Status   02/05/2024 75 40 - 150 U/L Final     Comment:     Reference intervals for this  test were updated on 11/14/2023 to more accurately reflect our healthy population. There may be differences in the flagging of prior results with similar values performed with this method. Interpretation of those prior results can be made in the context of the updated reference intervals.   01/13/2021 72 40 - 150 U/L Final     ALT   Date Value Ref Range Status   02/05/2024 16 0 - 50 U/L Final     Comment:     Reference intervals for this test were updated on 6/12/2023 to more accurately reflect our healthy population. There may be differences in the flagging of prior results with similar values performed with this method. Interpretation of those prior results can be made in the context of the updated reference intervals.     01/13/2021 17 0 - 50 U/L Final     AST   Date Value Ref Range Status   02/05/2024 19 0 - 45 U/L Final     Comment:     Reference intervals for this test were updated on 6/12/2023 to more accurately reflect our healthy population. There may be differences in the flagging of prior results with similar values performed with this method. Interpretation of those prior results can be made in the context of the updated reference intervals.   01/13/2021 13 0 - 45 U/L Final     Review of Systems:  10 systems (general, cardiovascular, respiratory, eyes, ENT, endocrine, GI, , M/S, neurological) were reviewed. Most pertinent finding(s) is/are: Some chronic pains, see HPI above. The remaining systems are all unremarkable.    Mental Status Examination (limited as this is by phone/video):  Appearance: Awake, alert, appears stated age, no acute distress, well-groomed   Attitude:  cooperative, pleasant   Motor: No gross abnormalities noted today.  Not formally tested  Oriented to:  person, place, time, and situation  Attention Span and Concentration:  normal  Speech: Normal volume, normal rate, normal rhythm, coherent  Language: intact  Mood: Pretty good  Affect: Overall appropriate and mood  congruent  Associations:  no loose associations  Thought Process:  logical, linear and goal oriented  Thought Content:  no evidence of suicidal ideation or homicidal ideation, no evidence of psychotic thought, no auditory hallucinations present and no visual hallucinations present  Recent and Remote Memory: Intact to interview.  Not formally assessed.   Fund of Knowledge: appropriate  Insight: Good  Judgment:  intact, adequate for safety  Impulse Control:  intact    Suicide Risk Assessment:  Today Jolynn Haji reports no suicidal ideation. Based on all available evidence including the factors cited above, Jolynn Haji does not appear to be at imminent risk for self-harm, does not meet criteria for a 72-hr hold, and therefore remains appropriate for ongoing outpatient level of care.  A thorough assessment of risk factors related to suicide and self-harm have been reviewed and are noted above. The patient convincingly denies suicidality on several occasions. Local community safety resources reviewed for patient to use if needed. There was no deceit detected, and the patient presented in a manner that was believable.     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  R/O Underlying Parkinson's Disease (pt has seen neurology - continuing to monitor)    Medical comorbidities include:   Patient Active Problem List    Diagnosis Date Noted    Essential hypertension with goal blood pressure less than 140/90 07/16/2001     Priority: High    Major depressive disorder, recurrent, mild (H24) 12/06/2023     Priority: Medium    Bilateral lower extremity edema- trace pitting edema distal LE to mid pretibial areas Left > right 09/27/2023     Priority: Medium    Tremor 09/27/2023     Priority: Medium    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 - lower pretibial area  07/12/2023      Priority: Medium    Drug-induced parkinsonism (H)- Abilify - generic = ARIPiprazole( 5mg) - RESOLVED OFF ABILIFY  01/02/2023     Priority: Medium    Mood disorder (H24) 01/02/2023     Priority: Medium    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 - much improved - resolved as of 5/5/2023 07/07/2022     Priority: Medium    Adjustment insomnia - resolved as of 5/5/2023 - off of ambien with psychiatry's okay  12/23/2021     Priority: Medium    Screening for cervical cancer      Priority: Medium     1999 NIL  2000 ASCUS-H.  >> Colpo: Neg  2001 NIL   2003 NIL pap, Neg HPV  [id number], 2016  NIL pap  7/25/19 NIL pap, Neg HPV    Criteria necessary to stop screening per ASCCP guidelines:  Older than 65 years  Three consecutive negative cytology results or two consecutive negative cotest results within the previous 10 years, with the most recent being performed within the last 5 years.   (Women with hx of MADDIE 2 or 3, or adenocarcinoma in situ should continue screening for full 20 years, even if this goes past age 65).        Hyperlipidemia LDL goal <130 11/30/2018     Priority: Medium    Basal cell carcinoma      Priority: Medium    Colon polyps 05/01/2018     Priority: Medium    Primary localized osteoarthrosis of left lower leg 02/19/2018     Priority: Medium    Other secondary osteoarthritis of left knee 01/29/2018     Priority: Medium    Chronic constipation 09/14/2017     Priority: Medium    Raynaud's phenomenon without gangrene 09/14/2017     Priority: Medium    Insomnia, unspecified type 07/25/2017     Priority: Medium    TIFFANY (generalized anxiety disorder) 05/23/2017     Priority: Medium    Adjustment disorder with mixed anxiety and depressed mood- mild  at this time 05/11/2017     Priority: Medium    Family history of celiac disease 05/11/2017     Priority: Medium    Basal cell carcinoma of leg, right 05/01/2016     Priority: Medium    Sun-damaged skin  04/21/2016     Priority: Medium    Seasonal allergic rhinitis 04/21/2016     Priority: Medium    Dupuytren's disease of palm - right 4th digit flexor tendon 06/11/2015     Priority: Medium    History of migraine headaches- assoc. with nausea/vomiting - resolved around menopause - were  premenstrual  01/08/2014     Priority: Medium    Rosacea 05/01/2013     Priority: Medium    Numbness of feet- distal feet R>L  02/21/2011     Priority: Medium    NUMBNESS AND TINGLING OF FOOT - bilateral -mild  08/25/2010     Priority: Medium    Osteopenia 01/02/2009     Priority: Medium    Lipidosis 10/24/2006     Priority: Medium     Problem list name updated by automated process. Provider to review      Localized osteoarthritis of hand 11/01/2005     Priority: Medium     Problem list name updated by automated process. Provider to review      Stress incontinence, female - mild 10/23/2012     Priority: Low    Cough 10/23/2012     Priority: Low    Acute bronchitis- recurrent- ? related to mold in school building- pt has since retired and no further bronchitis 10/23/2012     Priority: Low    Postmenopausal      Priority: Low    DERMATOPHYTOSIS OF NAIL- toenails  03/14/2006     Priority: Low     trichophyton rubrum on culture - 1/06       Synovial cyst of popliteal space      Priority: Low    Hidradenitis 12/05/2003     Priority: Low       Psychosocial & Contextual Factors: see HPI above    Assessment:  From Intake, 5/12/2022:  Jolynn Haji is a 66-year-old female with a history including anxiety, depression, insomnia status post concussion in April 2017.  No major mental health struggles prior to concussion in 2017.  Patient was started on Lexapro, quetiapine, mirtazapine to help with her symptoms.  Patient had been feeling much better on that medication combination and once she was sleeping well again quetiapine and mirtazapine were discontinued.  This past January the patient started to have some worsening symptoms and Abilify was  added.  Patient has felt like her current medications of Lexapro and Abilify have been helpful but reports starting to feel really slowed and numb the past several weeks.  She also reported a weight loss of nearly 20 pounds over the last 6 weeks versus several months (patient was not very sure). I am wondering if she could be experiencing side effects of her increased Lexapro dose.  She is agreeable to decreasing her dose slightly to 15 mg daily.  Could also consider decreasing Abilify in the case it is not her Lexapro.  She also desires to sleep a little bit better than she has been.  Could consider restarting mirtazapine at bedtime for sleep and to also augment her Lexapro.  Could also be beneficial for patient to undergo cognitive screening to check in and see what her baseline is status post concussion.  No acute safety concerns.  No SI.  No problematic drug or alcohol use.    5/24/2022:  Patient with some slight improvements of possible medication related side effects after decreasing Lexapro.  I am wondering if there might be more robust improvements with decreasing Abilify/aripiprazole.  Possible drug-induced Parkinsonism?  She does seem to have delayed/slowed speech, masked facies.  Her gait and other movements unable to be observed but she does report other struggles that may be consistent with such a presentation.  We are going to decrease her Abilify down to 2 mg daily and I will see her back in just a few weeks.  We will likely discontinue her Abilify at her next visit.  I will message her primary care provider to get some collateral regarding previous baseline functioning prior to starting Abilify.  History of TBI could be confounding presentation.  Could consider neurology consult.    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  Instructed to stop Abilify today.  We will monitor for improvement of symptoms.  If symptoms do not improve may need to refer to neurology.  If  symptoms start to improve we will consider low-dose methylphenidate augmentation of Lexapro next visit to help with low energy, mood, focus and concentration, cognition, s/p TBI if still clinically indicated.  We would discuss risks and benefits with patient and her daughter at that time.  No other acute safety concerns or SI.  No problematic drug or alcohol use.  If patient starts to struggle with sleep would restart mirtazapine at bedtime.    7/28/2022:  Pt still with sxs consistent with drug-induced Parksinsonism.  Neurology consult will be placed so that patient might get scheduled sooner than later due to the typically long wait.  We will start Ambien in hopes we can get her sleeping much better.  Discussed risks and benefits of its use, particularly in the geriatric population.  Lexapro has not been as helpful as it helped for her anxiety.  We will taper this medication and start Effexor/venlafaxine in hopes that might be more effective for her symptoms.  No acute safety concerns.  No SI.  No problematic drug or alcohol use.    8/11/2022:  Overall with continued improved symptoms.  Hopefully patient will continue to have improvement of her symptoms off of Abilify.  Patient will be seeing neurology soon.  Encouraged to keep appointment.  We will continue taper off of Lexapro and continue on monotherapy with venlafaxine.  We will also continue Ambien at this time for sleep since patient is sleeping much better on the medication with improved symptoms and tolerating well.  No acute safety concerns.  No problematic drug or alcohol use.    9/1/2022:  Ongoing anxiety, still intense at times.  Still not sleeping very well.  Could consider sleep evaluation, but patient quite overwhelmed right now with various referrals and doctors appointments.  Briseyda had been working quite well but not keeping her sleep now.  Still falling asleep relatively well.  We will transition to Fraciscoien CR to see if that is more helpful.  We  will continue to consider sleep evaluation to rule out possibility of REM behavior sleep disorder.  We will also transition venlafaxine to bedtime to see if that helps with any possible nausea.  Working with neurology to monitor for Parkinson's disease.  I do recommend neuropsychological testing due to ongoing possible cognitive difficulties/struggles.  No acute safety concerns.  No SI.  No problematic drug or alcohol use.    10/3/2022:  Patient overall with some ongoing anxiety but slowly improving since it got worse late August.  Recommend continuing to optimize Effexor-XR, especially since patient tolerating well.  No longer having any nausea now that dose is taken at bedtime.  Patient feeling a little foggy and off balance with Ambien CR and so we will switch back to Ambien immediate release.  No falls.  Recommend increasing Effexor-XR first and once well tolerated switching back to Ambien immediate release.  May need to continue to work on alternative options for sleep if does not do well back on Ambien immediate release.  No acute safety concerns.  No SI.  No problematic drug or alcohol use.  Encouraged to continue working with neurology.  Next appointment in November.    10/24/2022:  Patient overall with some improved anxiety on increased dose of Effexor-XR.  Could use additional improvement of anxiety and so we will continue to optimize therapy with Effexor-XR.  We will increase dose to 150 mg daily.  Tolerating well with no notable negative side effects.  Patient transitioned back to Ambien CR and is sleeping better on this compared to Ambien immediate release.  We will continue Ambien CR for sleep.  Discussed possibility of sleep medicine referral but patient declines at this time.  Could consider in the future.  No acute safety concerns.  No SI.  No problematic drug or alcohol use.  Encouraged to continue working with neurology.    12/09/2022:  Patient overall doing fairly well.  Feels like increased dose  of venlafaxine has been helpful.  I do wonder if she is having some subtle withdrawal symptoms late in the afternoon with headache and fatigue.  Discussed shifting her dose to the morning.  However, she reports she will try to drink more water and see if that is helpful before trying to make changes to her medication.  She feels like things are going well enough that she really does not want to change anything.  Encouraged to continue in therapy.  No acute safety concerns.  No SI.  No problematic drug or alcohol use.    1/19/2023:  Patient overall doing well with ongoing improvements.  Patient even recently starting to drive again which is a big deal.  She continues in therapy and finds it helpful.  Discussed possible changes to her dosing to see if headaches might improve, along with fatigue.  Discussed possibly splitting her Effexor-XR dose as 75 mg twice daily versus 37 point 5 in the morning and 112.5 mg at bedtime.  Patient feels like she is in a good place right now and really does not want to make any medication changes if possible.  We will keep things the same for now.  Encouraged to drink lots of water.  No acute safety concerns.  No SI.  No problematic drug or alcohol use.    3/9/2023:  Since last visit we decreased Effexor-XR just slightly.  Unfortunately her anxiety has worsened on decreased dose.  Headaches and fatigue did not improve at all.  Since anxiety worsened we will go back up to 150 mg daily.  Patient prefers this over a trial of further decreased dose.  We will start a trial with propranolol to see if this helps improve anxiety and headaches.  May also help with her tremor.  Discussed watching for feeling too lightheaded or dizzy.  Patient also on low-dose lisinopril.  Last vital signs blood pressure 132/78 and pulse 96 on 2/24/2023.  No acute safety concerns.  No SI.  No problematic drug or alcohol use.    3/23/2023:  Patient overall doing very well.  Propranolol has been quite helpful.   Still having some anxiety and headaches so we will continue to optimize therapy with propranolol some.  We will increase to 20 mg twice daily.  Neuropsychiatric testing reviewed.  Some mild deficits but nothing indicating a neurocognitive disorder at this time.  This was reassuring to the patient.  Patient will review results with neurologist at upcoming appointment in May.  No acute safety concerns.  No SI.  No problematic drug or alcohol use.    4/27/2023:  Patient overall with relative stability of mood and anxiety symptoms.  We will work to discontinue Ambien to see if we can manage on medication such as trazodone.  I still continue to worry about possibility of prodromal Parkinson's disease.  Patient reported her sister noted patient to be yelling and screaming in her sleep the other night.  This concerns me for possible REM sleep behavior disorder.  Could simply be related to antidepressant therapy-but given recent parkinsonism response to neuroleptic, symptoms should continue to be monitored closely.  Could again consider sleep medicine referral at some point in the future if needed.  Patient also continues to have headaches.  If patient remains stable would like to trial decreased dose of Effexor-XR to see if this helps improve headaches.  No acute safety concerns.  No SI.  No problematic drug or alcohol use.    5/11/2023:  Patient overall doing well.  Was able to replace Ambien with trazodone and still sleeping well.  Still some headaches but greatly improved off Ambien.  Went back to taking propranolol 20 mg twice daily and finds this most effective for her anxiety, tremor, etc.  Discussed we could consider lowering venlafaxine ER in a couple of months if still doing really well and still having some headaches.  Patient has a neurology appointment tomorrow for check-in.  No acute safety concerns.  No SI.  No problematic drug or alcohol use.    7/11/2023:  Patient overall doing well still.  Mood has  remained stable.  Anxiety overall manageable and stable as well.  Unfortunately continues to have headaches and feel fatigued.  Discussed I would like to move ahead with a trial of decreased venlafaxine.  Patient agreeable and will monitor mental health symptoms.  No acute safety concerns.  No SI.  No problematic drug or alcohol use.    10/10/2023:  Patient overall doing okay.  Unfortunately headaches and fatigue have persisted even with decreased dose of venlafaxine.  We will increase propranolol and changed to extended release so it is once a day to see if that is a little bit more helpful for anxiety and headaches.  Patient controlled decreased dose of venlafaxine and we may consider changing to something different after the holidays.  We would like to prevent any decompensation during the holiday season.  No acute safety concerns.  No SI.  No problematic drug or alcohol use.  Patient continues in therapy.    1/9/2024:  Patient overall doing relatively well.  Still getting headaches from venlafaxine so we will decrease the dose further to 75 mg daily to see if helpful without worsening mental health symptoms.  No acute safety concerns.  No SI.  No problematic drug or alcohol use.    2/28/2024:  Patient doing relatively well.  Still some anxiety but manageable.  Anxiety does not currently interfere with patient's ability to function or go about daily activities.  Easily able to distract self.  No major mood symptoms at this time.  Side effects from venlafaxine have drastically improved on lower dose.  If mental health symptoms/anxiety were to worsen in the future, would strongly consider transition to sertraline due to negative side effects on higher doses of venlafaxine.  No acute safety concerns.  No SI.  No problematic drug or alcohol use.    6/26/2024:  Patient overall doing very well.  Mood has been stable.  Anxiety manageable.  Does use Tylenol twice a day every day for headaches.  Discussed possibility of  some rebound headaches.  Resources sent to patient via [employer].  We will continue to monitor dizziness.  Blood pressures can be a little low at times.  We discussed trazodone can cause dizziness and orthostatic hypotension (blood pressure drops upon standing).  Patient wonders if it is more related to her lisinopril.  Patient encouraged to document blood pressures and present to primary care provider for ongoing evaluation.  No acute safety concerns.  No SI.  No problematic drug or alcohol use.  No medication changes today.    Medication side effects and alternatives were reviewed. Health promotion activities recommended and reviewed today. All questions addressed. Education and counseling completed regarding risks and benefits of medications and psychotherapy options. Recommend therapy for additional support.     Treatment Plan:   Continue trazodone 25-50 mg at bedtime as needed for sleep  Continue Effexor-XR/venlafaxine ER 75 mg daily for anxiety.  Continue propranolol EXTENDED RELEASE 60 mg ONCE daily for anxiety, tremor, headaches.  Continue all other cares per primary care provider.   Continue all other medications as reviewed per electronic medical record today.   Safety plan reviewed. To the Emergency Department as needed or call after hours crisis line at [phone] or [phone]. [place] Crisis Text Line. Text [place] to 791588 or Suicide LifeLine Chat: suicidepreventionlifeline.org/chat  Continue individual psychotherapy for additional support and ongoing development of nonpharmacologic coping skills and strategies.  Schedule an appointment with me in 3 months or sooner as needed. Call [facility] at [phone] to schedule.  Follow up with primary care provider as planned or for acute medical concerns.  Call the psychiatric nurse line with medication questions or concerns at [phone].  [employer] may be used to communicate with your provider, but this is not intended to be used for  emergencies.    Administrative Billing:   Phone Call/Video Duration: 21 Minutes  Start: 10:38a  Stop: 10:59a    Patient Status:  Continuous care patient.     Signed:   Judy Pompa DO  [facility]    Disclaimer: This note consists of symbols derived from keyboarding, dictation and/or voice recognition software. As a result, there may be errors in the script that have gone undetected. Please consider this when interpreting information found in this chart.

## 2024-06-26 NOTE — PROGRESS NOTES
"Virtual Visit Details    Type of service:  Video Visit   Video Start Time: {video visit start/end time for provider to select:992067}  Video End Time:{video visit start/end time for provider to select:088988}    Originating Location (pt. Location): {video visit patient location:248781::\"Home\"}  {PROVIDER LOCATION On-site should be selected for visits conducted from your clinic location or adjoining [facility] hospital, academic office, or other nearby [facility] building. Off-site should be selected for all other provider locations, including home:194075}  Distant Location (provider location):  {virtual location provider:963246}  Platform used for Video Visit: {Virtual Visit Platforms:227166::\"[employer]\"}  "

## 2024-06-26 NOTE — PATIENT INSTRUCTIONS
Treatment Plan:   Continue trazodone 25-50 mg at bedtime as needed for sleep  Continue Effexor-XR/venlafaxine ER 75 mg daily for anxiety.  Continue propranolol EXTENDED RELEASE 60 mg ONCE daily for anxiety, tremor, headaches.  Continue all other cares per primary care provider.   Continue all other medications as reviewed per electronic medical record today.   Safety plan reviewed. To the Emergency Department as needed or call after hours crisis line at [phone] or [phone]. [place] Crisis Text Line. Text [place] to 281789 or Suicide LifeLine Chat: suicidepreventionlifeline.org/chat  Continue individual psychotherapy for additional support and ongoing development of nonpharmacologic coping skills and strategies.  Schedule an appointment with me in 3 months or sooner as needed. Call [facility] at [phone] to schedule.  Follow up with primary care provider as planned or for acute medical concerns.  Call the psychiatric nurse line with medication questions or concerns at [phone].  [employer] may be used to communicate with your provider, but this is not intended to be used for emergencies.    Patient Education   Collaborative Care Psychiatry Service  What to Expect  Here's what to expect from your Collaborative Care Psychiatry Service (CCPS).   About CCPS  CCPS means 2 people work together to help you get better. You'll meet with a behavioral health clinician and a psychiatric doctor. A behavioral health clinician helps people with mental health problems by talking with them. A psychiatric doctor helps people by giving them medicine.  How it works  At every visit, you'll see the behavioral health clinician (BHC) first. They'll talk with you about how you're doing and teach you how to feel better.   Then you'll see the psychiatric doctor. This doctor can help you deal with troubling thoughts and feelings by giving you medicine. They'll make sure you know the plan for your care.   CCPS  "usually takes 3 to 6 visits. If you need more visits, we may have you start seeing a different psychiatric doctor for ongoing care.  If you have any questions or concerns, we'll be glad to talk with you.  About visits  Be open  At your visits, please talk openly about your problems. It may feel hard, but it's the best way for us to help you.  Cancelling visits  If you can't come to your visit, please call us right away at [phone]. If you don't cancel at least 24 hours (1 full day) before your visit, that's \"late cancellation.\"  Being late to visits  Being very late is the same as not showing up. You will be a \"no show\" if:  Your appointment starts with a [facility], and you're more than 15 minutes late for a 30-minute (half hour) visit. This will also cancel your appointment with the psychiatric doctor.  Your appointment is with a psychiatric doctor only, and you're more than 15 minutes late for a 30-minute (half hour) visit.  Your appointment is with a psychiatric doctor only, and you're more than 30 minutes late for a 60-minute (full hour) visit.  If you cancel late or don't show up 2 times within 6 months, we may end your care.   Getting help between visits  If you need help between visits, you can call us Monday to Friday from 8 a.m. to 4:30 p.m. at [phone].  Emergency care  Call 911 or go to the nearest emergency department if your life or someone else's life is in danger.  Call 988 anytime to reach the national Suicide and Crisis hotline.  Medicine refills  To refill your medicine, call your pharmacy. You can also call [facility] at [phone], Monday to Friday, 8 a.m. to 4:30 p.m. It can take 1 to 3 business days to get a refill.   Forms, letters, and tests  You may have papers to fill out, like FMLA, short-term disability, and workability. We can help you with these forms at your visits, but you must have an appointment. You may need more than 1 visit for this, to be in " an intensive therapy program, or both.  Before we can give you medicine for ADHD, we may refer you to get tested for it or confirm it another way.  We may not be able to give you an emotional support animal letter.  We don't do mental health checks ordered by the court.   We don't do mental health testing, but we can refer you to get tested.   Thank you for choosing us for your care.  For informational purposes only. Not to replace the advice of your health care provider. Copyright   2022 [facility]. All rights reserved. [employer] [id number] - 12/22.

## 2024-07-08 ENCOUNTER — VIRTUAL VISIT (OUTPATIENT)
Dept: BEHAVIORAL HEALTH | Facility: CLINIC | Age: 69
End: 2024-07-08
Payer: COMMERCIAL

## 2024-07-08 DIAGNOSIS — F41.1 GENERALIZED ANXIETY DISORDER: Primary | ICD-10-CM

## 2024-07-08 PROCEDURE — 90832 PSYTX W PT 30 MINUTES: CPT | Mod: 95 | Performed by: COUNSELOR

## 2024-07-08 NOTE — PROGRESS NOTES
[facility] Care Psychiatry Services [facility]  July 8, 2024      Behavioral Health Clinician Progress Note    Patient Name: Jolynn Haji           Service Type:  Individual      Service Location:   MyChart / Email (patient reached)     Session Start Time: 1229pm   Session End Time: 106pm       Session Length: 16 - 37      Attendees: Patient     Service Modality:  Video Visit:      Provider verified identity through the following two step process.  Patient provided:  Patient is known previously to provider    Telemedicine Visit: The patient's condition can be safely assessed and treated via synchronous audio and visual telemedicine encounter.      Reason for Telemedicine Visit: Patient has requested telehealth visit    Originating Site (Patient Location): Patient's home    Distant Site (Provider Location): Provider Remote Setting- Home Office    Consent:  The patient/guardian has verbally consented to: the potential risks and benefits of telemedicine (video visit) versus in person care; bill my insurance or make self-payment for services provided; and responsibility for payment of non-covered services.     Patient would like the video invitation sent by:  My Chart    Mode of Communication:  Video Conference via [employer]    As the provider I attest to compliance with applicable laws and regulations related to telemedicine.    Visit Activities (Refresh list every visit): [facility] Only    Diagnostic Assessment Date: 2/28/24 (updated)  Treatment Plan Review Date: 8/28/24  See Flowsheets for today's PHQ-9 and TIFFANY-7 results  Previous PHQ-9:       5/27/2024     6:57 PM 6/11/2024    10:14 AM 6/26/2024     9:39 AM   PHQ-9 SCORE   PHQ-9 Total Score MyChart 0 2 (Minimal depression) 1 (Minimal depression)   PHQ-9 Total Score 0 2 1     Previous TIFFANY-7:       2/25/2024     1:22 PM 4/23/2024     3:26 PM 4/25/2024     1:56 PM   TIFFANY-7 SCORE   Total Score 3 (minimal anxiety) 2 (minimal anxiety) 0 (minimal anxiety)    Total Score 3 2 0       KARINA LEVEL:      8/27/2010     3:00 PM 2/22/2011    11:00 AM   KARINA Score (Last Two)   KARINA Raw Score 51 49   Activation Score 91.6 82.8   KARINA Level 4 4       DATA  Extended Session (60+ minutes): No  Interactive Complexity: No  Crisis: No  [facility] Patient: No    Treatment Objective(s) Addressed in This Session:  Target Behavior(s):  anxiety    Anxiety: will experience a reduction in anxiety and will develop more effective coping skills to manage anxiety symptoms    Current Stressors / Issues:  7/8/2024  Pt gained insight that they jump from thought to thought. She used a lorena to practice deep breathing when feeling anxious. Unexpected events coming up invoke anxiety for pt. Pt used self talk with family members dropped in unexpectedly. She recognizes that anxiety won't last forever. Pt has talked with others about how they experience anxiety and knowing that they aren't along helps provide comfort.     6/11/2024  Pt says that things have gone well. She checked in and started the visit she said that she didn't finish the questions and she was able to manage this. When pt has anxiety the first thing they want to do is go outside. 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 Pt has read a lot of books Erika provided to pt. She says that her and her PCP agree that there is a bit more to get back to where she was. Head aches are regular for pt.     Pt says that checking in with pt to see how they are doing would be helpful for future visits.     5/28/2024  [place]. Reports some high anxiety with kids getting into a fight. Was able to manage getting them back together after some conversation. She has not had anxiety like this in a while. Discussed taking time to herself to recover and processing emotions involved. She went to see friend who suffers significantly from MH. Feels the interaction was better than it has been. Continues to  approach relationship with empathy as it has been difficult with her friends slow progress.       5/13/24  Reports she had a wonderful mothers day with kids. Continues to see her friend who is having mental health struggles. Struggles with her own emotions she has after seeing friend. Processed feelings of frustration and guilt. Discussed nature of present anxiety. Feels she has anxiety daily, normalized her experience. Discussed anxiety more in down times when she has more head space to think about things. She continues engage in skills for anxiety. Continues to have headaches which is frustrating to her. Discussed fears of making med changes. Encouraged her to have conversation further with MD. Discussed skills she has learned and how these can be very beneficial when making medication changes. Continues to weigh pros and cons of changing meds to hopefully improve headaches.      Progress on Treatment Objective(s) / Homework:  Satisfactory progress - ACTION (Actively working towards change); Intervened by reinforcing change plan / affirming steps taken    CBT: Azalea talks about situations that occurred that cause her to feel anxious. She explains that they were both when things came out of the blue she was not expecting. Pt was able to recognize warning signs and use self talk to slow down and do some deep breathing.   ACT: [facility] celebrates with pt that they were able to recognize warning signs, practice deep breathing and self talk. [facility] provides pt with information about ACT skills to promote flexibility, a movie theatre metaphor and to explore values and what they mean in situations.     Motivational Interviewing    MI Intervention: Expressed Empathy/Understanding, Supported Autonomy, Collaboration, Evocation, Open-ended questions, Change talk (evoked) and Reframe     Change Talk Expressed by the Patient: Desire to change Ability to change Reasons to change Need to change    Provider Response to Change Talk: E -  Evoked more info from patient about behavior change, A - Affirmed patient's thoughts, decisions, or attempts at behavior change, R - Reflected patient's change talk and S - Summarized patient's change talk statements      Care Plan review completed: Yes    Medication Review:  No changes to current psychiatric medication(s)    Medication Compliance:  Yes    Changes in Health Issues:   None reported    Chemical Use Review:   Substance Use: Chemical use reviewed, no active concerns identified      Tobacco Use: No current tobacco use.      Assessment: Current Emotional / Mental Status (status of significant symptoms):  Risk status (Self / Other harm or suicidal ideation)  Patient denies a history of suicidal ideation, suicide attempts, self-injurious behavior, homicidal ideation, homicidal behavior and and other safety concerns  Patient denies current fears or concerns for personal safety.  Patient denies current or recent suicidal ideation or behaviors.  Patient denies current or recent homicidal ideation or behaviors.  Patient denies current or recent self injurious behavior or ideation.  Patient denies other safety concerns.  A safety and risk management plan has not been developed at this time, however patient was encouraged to call [place] [phone] should there be a change in any of these risk factors.    Appearance:   Appropriate , well groomed  Eye Contact:   Good   Psychomotor Behavior: Normal   Attitude:   Cooperative   Orientation:   All  Speech   Rate / Production: Normal    Volume:  Normal   Mood:    Anxious   Affect:    Appropriate   Thought Content:  Clear   Thought Form:  Coherent  Logical   Insight:    Good , continuing to improve    Diagnoses:  1. Generalized anxiety disorder        Collateral Reports Completed:  Not Applicable    Plan: (Homework, other):  Patient was given information about behavioral services and encouraged to schedule a follow up appointment with the clinic [facility]  in 2 weeks .  She  was also given information about mental health symptoms and treatment options .  CD Recommendations: No indications of CD issues.  Gina Freeman, [facility]      ______________________________________________________________________    Integrated Primary Care Behavioral Health Treatment Plan    Patient's Name: Jolynn Haji  YOB: 1955    Date of Creation: 10/5/22  Date Treatment Plan Last Reviewed/Revised: 5/28/24    DSM5 Diagnoses: 300.02 (F41.1) Generalized Anxiety Disorder  Psychosocial / Contextual Factors: Individual Factors high anxiety associated with depressive sx, interfereing with ability to function as she would like.  .  PROMIS (reviewed every 90 days):     Referral / Collaboration:  Was/were discussed and patient will pursue. - individual therapy    Anticipated number of session for this episode of care: 5-8 sessions  Anticipation frequency of session: Weekly  Anticipated Duration of each session: 38-52 minutes  Treatment plan will be reviewed in 90 days or when goals have been changed.       MeasurableTreatment Goal(s) related to diagnosis / functional impairment(s)  Goal 1: Patient will work with provider to manage symptoms    I will know I've met my goal when when less anxious and feeling down.      Objective #A (Patient Action)    Patient will   attend all sessions .  Status: Continued - Date(s): 5/28/24    Intervention(s)  Therapist will teach  educate patient on treatment options, clarify concerns, work with pt to overcome any resistance to compliance. .    Objective #B  Patient will identify 3 fears / thoughts that contribute to feeling anxious.  Status: Continued - Date(s): 5/28/24    Intervention(s)  Therapist will  educate patient on treatment options, clarify concerns, work with pt to overcome any resistance to compliance. .    Objective #C  Patient will identify 3 initial signs or symptoms of anxiety.  Status: Continued - Date(s): 5/28/24    Intervention(s)  Therapist will   educate patient on treatment options, clarify concerns, work with pt to overcome any resistance to compliance. .        Patient has reviewed and agreed to the above plan.      Erika STARK [facility]  May 28, 2024

## 2024-07-22 NOTE — PROGRESS NOTES
[facility] Care Psychiatry Services [facility]  July 24, 2024      Behavioral Health Clinician Progress Note    Patient Name: Jolynn Haji           Service Type:  Individual      Service Location:   MyChart / Email (patient reached)     Session Start Time: 1231pm   Session End Time: 108pm       Session Length: 16 - 37      Attendees: Patient     Service Modality:  Video Visit:      Provider verified identity through the following two step process.  Patient provided:  Patient photo and Patient is known previously to provider    Telemedicine Visit: The patient's condition can be safely assessed and treated via synchronous audio and visual telemedicine encounter.      Reason for Telemedicine Visit: Patient has requested telehealth visit    Originating Site (Patient Location): Patient's home    Distant Site (Provider Location): [facility] Clinics: [facility] , On Site    Consent:  The patient/guardian has verbally consented to: the potential risks and benefits of telemedicine (video visit) versus in person care; bill my insurance or make self-payment for services provided; and responsibility for payment of non-covered services.     Patient would like the video invitation sent by:  My Chart    Mode of Communication:  Video Conference via [facility]    As the provider I attest to compliance with applicable laws and regulations related to telemedicine.    Visit Activities (Refresh list every visit): [facility] Only    Diagnostic Assessment Date: 2/28/24 (updated)  Treatment Plan Review Date: 8/28/24  See Flowsheets for today's PHQ-9 and TIFFANY-7 results  Previous PHQ-9:       5/27/2024     6:57 PM 6/11/2024    10:14 AM 6/26/2024     9:39 AM   PHQ-9 SCORE   PHQ-9 Total Score MyChart 0 2 (Minimal depression) 1 (Minimal depression)   PHQ-9 Total Score 0 2 1     Previous TIFFANY-7:       2/25/2024     1:22 PM 4/23/2024     3:26 PM 4/25/2024     1:56 PM   TIFFANY-7 SCORE   Total Score 3 (minimal anxiety) 2  (minimal anxiety) 0 (minimal anxiety)   Total Score 3 2 0       KARINA LEVEL:      8/27/2010     3:00 PM 2/22/2011    11:00 AM   KARINA Score (Last Two)   KARINA Raw Score 51 49   Activation Score 91.6 82.8   KARINA Level 4 4       DATA  Extended Session (60+ minutes): No  Interactive Complexity: No  Crisis: No  [facility] Patient: No    Treatment Objective(s) Addressed in This Session:  Target Behavior(s):  anxiety    Anxiety: will experience a reduction in anxiety and will develop more effective coping skills to manage anxiety symptoms    Current Stressors / Issues:  7/24/2024  Pt reports that she just found out a bit before the visit that a family member passed away, she is still processing this loss. She has been working on her to-do list for the week and that lists help to manage stress and there relief of crossing things out. Pt has continued to try to be in the present and will use self talk if she gets off track.   [facility] provides pr with a sounds lorena Atmopshere and talks about relaxing sounds. [facility] provides psychoeducation to pt that meditating in her chair and feeling relaxed and calm is because her brain has made this association. [facility] recommends pt when she is feeling anxious close eyes and imagine being in the room sitting in the chair and the feel, sounds she hears, things she sees and smells.       7/8/2024  Pt gained insight that they jump from thought to thought. She used a lorena to practice deep breathing when feeling anxious. Unexpected events coming up invoke anxiety for pt. Pt used self talk with family members dropped in unexpectedly. She recognizes that anxiety won't last forever. Pt has talked with others about how they experience anxiety and knowing that they aren't alone helps provide comfort.     6/11/2024  Pt says that things have gone well. She checked in and started the visit she said that she didn't finish the questions and she was able to manage this. When pt has anxiety the first thing they want to do is go  outside. 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 Pt has read a lot of books Erika provided to pt. She says that her and her PCP agree that there is a bit more to get back to where she was. Head aches are regular for pt.     Pt says that checking in with pt to see how they are doing would be helpful for future visits.     5/28/2024  [place]. Reports some high anxiety with kids getting into a fight. Was able to manage getting them back together after some conversation. She has not had anxiety like this in a while. Discussed taking time to herself to recover and processing emotions involved. She went to see friend who suffers significantly from MH. Feels the interaction was better than it has been. Continues to approach relationship with empathy as it has been difficult with her friends slow progress.       5/13/24  Reports she had a wonderful mothers day with kids. Continues to see her friend who is having mental health struggles. Struggles with her own emotions she has after seeing friend. Processed feelings of frustration and guilt. Discussed nature of present anxiety. Feels she has anxiety daily, normalized her experience. Discussed anxiety more in down times when she has more head space to think about things. She continues engage in skills for anxiety. Continues to have headaches which is frustrating to her. Discussed fears of making med changes. Encouraged her to have conversation further with MD. Discussed skills she has learned and how these can be very beneficial when making medication changes. Continues to weigh pros and cons of changing meds to hopefully improve headaches.      Progress on Treatment Objective(s) / Homework:  Satisfactory progress - ACTION (Actively working towards change); Intervened by reinforcing change plan / affirming steps taken    CBT: She talks about skills she is using to manage anxiety and keep it low and be in  the present. She says with today's visit incase she needs it she has a lemon drop close.     CBT: [facility] provides psychoeducation about the brain's associations and ways to use guided meditations for relaxation.     Motivational Interviewing    MI Intervention: Expressed Empathy/Understanding, Supported Autonomy, Collaboration, Evocation, Open-ended questions, Change talk (evoked) and Reframe     Change Talk Expressed by the Patient: Desire to change Ability to change Reasons to change Need to change    Provider Response to Change Talk: E - Evoked more info from patient about behavior change, A - Affirmed patient's thoughts, decisions, or attempts at behavior change, R - Reflected patient's change talk and S - Summarized patient's change talk statements      Care Plan review completed: Yes    Medication Review:  No changes to current psychiatric medication(s)    Medication Compliance:  Yes    Changes in Health Issues:   None reported    Chemical Use Review:   Substance Use: Chemical use reviewed, no active concerns identified      Tobacco Use: No current tobacco use.      Assessment: Current Emotional / Mental Status (status of significant symptoms):  Risk status (Self / Other harm or suicidal ideation)  Patient denies a history of suicidal ideation, suicide attempts, self-injurious behavior, homicidal ideation, homicidal behavior and and other safety concerns  Patient denies current fears or concerns for personal safety.  Patient denies current or recent suicidal ideation or behaviors.  Patient denies current or recent homicidal ideation or behaviors.  Patient denies current or recent self injurious behavior or ideation.  Patient denies other safety concerns.  A safety and risk management plan has not been developed at this time, however patient was encouraged to call [place] [phone] should there be a change in any of these risk factors.    Appearance:   Appropriate   Eye Contact:   Good   Psychomotor  Behavior: Normal   Attitude:   Cooperative   Orientation:   All  Speech   Rate / Production: Normal    Volume:  Normal   Mood:    Anxious , improving  Affect:    Appropriate   Thought Content:  Clear   Thought Form:  Coherent  Logical   Insight:    Good , continuing to improve    Diagnoses:  1. Generalized anxiety disorder        Collateral Reports Completed:  Not Applicable    Plan: (Homework, other):  Patient was given information about behavioral services and encouraged to schedule a follow up appointment with the clinic [facility]  in 3 weeks .  She was also given information about mental health symptoms and treatment options .  CD Recommendations: No indications of CD issues.  Gina Freeman, [facility], [facility]      ______________________________________________________________________    Integrated Primary Care Behavioral Health Treatment Plan    Patient's Name: Jolynn Haji  YOB: 1955    Date of Creation: 10/5/22  Date Treatment Plan Last Reviewed/Revised: 5/28/24    DSM5 Diagnoses: 300.02 (F41.1) Generalized Anxiety Disorder  Psychosocial / Contextual Factors: Individual Factors high anxiety associated with depressive sx, interfereing with ability to function as she would like.  .  PROMIS (reviewed every 90 days):     Referral / Collaboration:  Was/were discussed and patient will pursue. - individual therapy    Anticipated number of session for this episode of care: 5-8 sessions  Anticipation frequency of session: Weekly  Anticipated Duration of each session: 38-52 minutes  Treatment plan will be reviewed in 90 days or when goals have been changed.       MeasurableTreatment Goal(s) related to diagnosis / functional impairment(s)  Goal 1: Patient will work with provider to manage symptoms    I will know I've met my goal when when less anxious and feeling down.      Objective #A (Patient Action)    Patient will   attend all sessions .  Status: Continued - Date(s): 5/28/24    Intervention(s)  Therapist will  teach  educate patient on treatment options, clarify concerns, work with pt to overcome any resistance to compliance. .    Objective #B  Patient will identify 3 fears / thoughts that contribute to feeling anxious.  Status: Continued - Date(s): 5/28/24    Intervention(s)  Therapist will  educate patient on treatment options, clarify concerns, work with pt to overcome any resistance to compliance. .    Objective #C  Patient will identify 3 initial signs or symptoms of anxiety.  Status: Continued - Date(s): 5/28/24    Intervention(s)  Therapist will  educate patient on treatment options, clarify concerns, work with pt to overcome any resistance to compliance. .        Patient has reviewed and agreed to the above plan.      Erika Francisco [facility]  May 28, 2024

## 2024-07-24 ENCOUNTER — VIRTUAL VISIT (OUTPATIENT)
Dept: BEHAVIORAL HEALTH | Facility: CLINIC | Age: 69
End: 2024-07-24
Payer: COMMERCIAL

## 2024-07-24 DIAGNOSIS — F41.1 GENERALIZED ANXIETY DISORDER: Primary | ICD-10-CM

## 2024-07-24 PROCEDURE — 90832 PSYTX W PT 30 MINUTES: CPT | Mod: 95 | Performed by: COUNSELOR

## 2024-07-29 NOTE — PROGRESS NOTES
[facility] Psychiatry Services [facility]  July 31, 2024      Behavioral Health Clinician Progress Note    Patient Name: Jolynn Haji           Service Type:  Individual      Service Location:   MyChart / Email (patient reached)     Session Start Time: 230pm  Session End Time: 258pm      Session Length: 16 - 37      Attendees: Client     Service Modality:  Video Visit:      Provider verified identity through the following two step process.  Patient provided:  Patient is known previously to provider    Telemedicine Visit: The patient's condition can be safely assessed and treated via synchronous audio and visual telemedicine encounter.      Reason for Telemedicine Visit: Services only offered telehealth    Originating Site (Patient Location): Patient's home    Distant Site (Provider Location): Provider Remote Setting- Home Office    Consent:  The patient/guardian has verbally consented to: the potential risks and benefits of telemedicine (video visit) versus in person care; bill my insurance or make self-payment for services provided; and responsibility for payment of non-covered services.     Patient would like the video invitation sent by:  My Chart    Mode of Communication:  Video Conference via [facility]    Distant Location (Provider):  Off-site    As the provider I attest to compliance with applicable laws and regulations related to telemedicine.    Visit Activities (Refresh list every visit): [facility] Only    Diagnostic Assessment Date: 2/28/24 Mica George MA [facility]  Treatment Plan Review Date: 6/26/24  See Flowsheets for today's PHQ-9 and TIFFANY-7 results  Previous PHQ-9:       6/11/2024    10:14 AM 6/26/2024     9:39 AM 7/31/2024     2:18 PM   PHQ-9 SCORE   PHQ-9 Total Score MyChart 2 (Minimal depression) 1 (Minimal depression) 2 (Minimal depression)   PHQ-9 Total Score 2 1 2     Previous TIFFANY-7:       2/25/2024     1:22 PM 4/23/2024     3:26 PM 4/25/2024     1:56 PM   TIFFANY-7 SCORE   Total Score 3  (minimal anxiety) 2 (minimal anxiety) 0 (minimal anxiety)   Total Score 3 2 0     DATA  Extended Session (60+ minutes): No  Interactive Complexity: No  Crisis: No  [facility] Patient: No    Treatment Objective(s) Addressed in This Session:  Target Behavior(s): disease management/lifestyle changes reducing anxiety    Anxiety: will experience a reduction in anxiety, will develop more effective coping skills to manage anxiety symptoms, and will develop healthy cognitive patterns and beliefs    Current Stressors / Issues:  7/31 [facility] only  Staying busy birthday parties  VBS next week  Being highly social  Son might have tick bite sx- high anxiety, calling him numerous times.  Discussed catatrophization/behaviors  Discussed people pleasing    6/29   update:  Going really well.  Have on going headaches.  I feels manageable however.  Way less brain fog.    Stresses:  Had to put in new furnaces and air conditioners.   Appetite: N/a   Sleep:  N/a  Outpatient Provider updates: Erika Mcmahon leave temporarily Nathalie MONTIEL [facility]  SI/SIB/HI: N/a  FARIDA: Denies  Side effects/compliance: N/a  Interventions:  [facility] discussed anxiety mgmt and TIPP skills/decision paralysis.   Most important:  Doing well!    Warning signs: not energy, not being social, not having odessa    2/28   update:  Reduced effexor since last visit.  On going well.  Brain fog gone.  Stresses:  Headache pain is reduced.  Appetite: Denies  Sleep: Trazadone works, no problem  Outpatient Provider updates: [facility] Erika OCHOA  Uses CALM lorena daily with afternoon anxiety  SI/SIB/HI:  Denies past attempts.  Previous passive ideation  FARIDA:  Denies  Side effects/compliance:  N/a  Interventions:  [facility] engaged in completing DA with psychoeducation with treatment planning  Most important: Med changes are great.    Does homework and books to read.    Uses CALM lorena  5/4/3/2/1  May I skills  Journaling  Putting thoughts in box, for another day  Word lorena  Making choices can be difficult sets  limits      Progress on Treatment Objective(s) / Homework:  New Objective established this session - CONTEMPLATION (Considering change and yet undecided); Intervened by assessing the negative and positive thinking (ambivalence) about behavior change    Motivational Interviewing    MI Intervention: Co-Developed Goal: reducing anxiety and Expressed Empathy/Understanding     Change Talk Expressed by the Patient: Desire to change Ability to change    Provider Response to Change Talk: E - Evoked more info from patient about behavior change and A - Affirmed patient's thoughts, decisions, or attempts at behavior change    Also provided psychoeducation about behavioral health condition, symptoms, and treatment options    Assessments completed prior to visit:  The following assessments were completed by patient for this visit:  PHQ2:       5/3/2023     1:18 PM 3/21/2023    11:20 AM 2/23/2023     7:15 PM 1/31/2023     3:21 PM 1/12/2023     4:28 PM 12/15/2022     9:12 PM 12/8/2022     9:38 AM   PHQ-2 ( 1999 [employer])   Q1: Little interest or pleasure in doing things   0 0 0  0   Q2: Feeling down, depressed or hopeless   0 0 0  0   PHQ-2 Score   0 0 0  0   Q1: Little interest or pleasure in doing things   Not at all Not at all Not at all  Not at all   Q2: Feeling down, depressed or hopeless   Not at all Not at all Not at all  Not at all   PHQ-2 Score Incomplete Incomplete 0 0 0 Incomplete 0     GAD2:       2/25/2024     2:08 PM 4/2/2024     8:20 PM 5/9/2024     6:47 PM 6/9/2024     8:47 PM 6/23/2024     7:59 PM 7/21/2024     8:03 PM 7/29/2024    11:49 AM   TIFFANY-2   Feeling nervous, anxious, or on edge 2 1 1 1 1 1 1   Not being able to stop or control worrying 0 0 0 0 0 0 0   TIFFANY-2 Total Score 2 1 1 1 1    1 1 1       Care Plan review completed: Yes    Medication Review:  Changes to psychiatric medications, see updated Medication List in EPIC.     Medication Compliance:  Yes    Changes in Health Issues:   None reported    Chemical  Use Review:   Substance Use: Chemical use reviewed, no active concerns identified      Tobacco Use: No current tobacco use.      Assessment: Current Emotional / Mental Status (status of significant symptoms):  Risk status (Self / Other harm or suicidal ideation)  Patient denies a history of suicidal ideation, suicide attempts, self-injurious behavior, homicidal ideation, homicidal behavior, and and other safety concerns  Patient denies current fears or concerns for personal safety.  Patient denies current or recent suicidal ideation or behaviors.  Patient denies current or recent homicidal ideation or behaviors.  Patient denies current or recent self injurious behavior or ideation.  Patient denies other safety concerns.  A safety and risk management plan has not been developed at this time, however patient was encouraged to call [place] [phone] should there be a change in any of these risk factors.    Appearance:   Appropriate   Eye Contact:   Good   Psychomotor Behavior: Normal   Attitude:   Cooperative   Orientation:   All  Speech   Rate / Production: Normal    Volume:  Normal   Mood:    Normal  Affect:    Appropriate   Thought Content:  Clear   Thought Form:  Coherent  Logical   Insight:    Good     Diagnoses:  1. Generalized anxiety disorder          Collateral Reports Completed:  Communicated with: Dr Pompa    Plan: (Homework, other):  Patient was given information about behavioral services and encouraged to schedule a follow up appointment with the clinic [facility] as needed.  She was also given information about mental health symptoms and treatment options .  CD Recommendations: No indications of CD issues.       Mica George [facility]    ______________________________________________________________________    Integrated Primary Care Behavioral Health Treatment Plan    Patient's Name: Jolynn Haji  YOB: 1955    Date of Creation: 6/26/24  Date Treatment Plan Last Reviewed/Revised: 6/26/24    DSM5  Diagnoses: 300.02 (F41.1) Generalized Anxiety Disorder  Psychosocial / Contextual Factors: interpersonal   PROMIS (reviewed every 90 days):    PROMIS-10 Scores      10/8/2023     6:00 PM 1/8/2024    11:52 AM 1/15/2024     7:25 PM 2/25/2024     2:04 PM 5/27/2024     7:01 PM 6/9/2024     8:48 PM 6/23/2024     8:01 PM   PROMIS-10 Scores Only   Global Mental Health Score 17 17 17 17 17 17 17    17   Global Physical Health Score 16 19 16 17 19 19 19    19   PROMIS TOTAL - SUBSCORES 33 36 33 34 36 36 36    36         Referral / Collaboration:  Referral to another professional/service is not indicated at this time..    Anticipated number of session for this episode of care: 5-6  Anticipation frequency of session: Monthly  Anticipated Duration of each session: 16-37 minutes  Treatment plan will be reviewed in 90 days or when goals have been changed.       MeasurableTreatment Goal(s) related to diagnosis / functional impairment(s)  Goal 1: Patient will reduce sx of anxiety    I will know I've met my goal when I can stay in the moment and not worry.      Objective #A (Patient Action)    Patient will use thought-stopping strategy daily to reduce intrusive thoughts.  Status: New - Date: 6/26/24      Intervention(s)  Therapist will provide support through CBT, MI, Acceptance and Commitment Therapy, Dialectic Behavioral Therapy and problem solving model to explore and overcome barriers.        Patient has reviewed and agreed to the above plan.      NARESH Mena  July 31, 2024

## 2024-07-31 ENCOUNTER — VIRTUAL VISIT (OUTPATIENT)
Dept: BEHAVIORAL HEALTH | Facility: CLINIC | Age: 69
End: 2024-07-31
Payer: COMMERCIAL

## 2024-07-31 DIAGNOSIS — F41.1 GENERALIZED ANXIETY DISORDER: Primary | ICD-10-CM

## 2024-07-31 PROCEDURE — 90832 PSYTX W PT 30 MINUTES: CPT | Mod: 95 | Performed by: COUNSELOR

## 2024-08-12 ENCOUNTER — VIRTUAL VISIT (OUTPATIENT)
Dept: BEHAVIORAL HEALTH | Facility: CLINIC | Age: 69
End: 2024-08-12
Payer: COMMERCIAL

## 2024-08-12 DIAGNOSIS — F41.1 GENERALIZED ANXIETY DISORDER: Primary | ICD-10-CM

## 2024-08-12 PROCEDURE — 90832 PSYTX W PT 30 MINUTES: CPT | Mod: 95 | Performed by: COUNSELOR

## 2024-08-12 ASSESSMENT — ANXIETY QUESTIONNAIRES
6. BECOMING EASILY ANNOYED OR IRRITABLE: NOT AT ALL
5. BEING SO RESTLESS THAT IT IS HARD TO SIT STILL: NOT AT ALL
4. TROUBLE RELAXING: NOT AT ALL
IF YOU CHECKED OFF ANY PROBLEMS ON THIS QUESTIONNAIRE, HOW DIFFICULT HAVE THESE PROBLEMS MADE IT FOR YOU TO DO YOUR WORK, TAKE CARE OF THINGS AT HOME, OR GET ALONG WITH OTHER PEOPLE: NOT DIFFICULT AT ALL
7. FEELING AFRAID AS IF SOMETHING AWFUL MIGHT HAPPEN: NOT AT ALL
2. NOT BEING ABLE TO STOP OR CONTROL WORRYING: NOT AT ALL
3. WORRYING TOO MUCH ABOUT DIFFERENT THINGS: NOT AT ALL
8. IF YOU CHECKED OFF ANY PROBLEMS, HOW DIFFICULT HAVE THESE MADE IT FOR YOU TO DO YOUR WORK, TAKE CARE OF THINGS AT HOME, OR GET ALONG WITH OTHER PEOPLE?: NOT DIFFICULT AT ALL
GAD7 TOTAL SCORE: 1
1. FEELING NERVOUS, ANXIOUS, OR ON EDGE: SEVERAL DAYS
GAD7 TOTAL SCORE: 1
7. FEELING AFRAID AS IF SOMETHING AWFUL MIGHT HAPPEN: NOT AT ALL

## 2024-08-12 NOTE — PROGRESS NOTES
[facility]  August 12, 2024       Behavioral Health Clinician Progress Note    Patient Name: Jolynn Haji           Service Type:  Individual      Service Location:   MyChart / Email (patient reached)     Session Start Time: 1232pm   Session End Time: 108pm       Session Length: 16 - 37      Attendees: Patient     Service Modality:  Video Visit:      Provider verified identity through the following two step process.  Patient provided:  Patient photo and Patient is known previously to provider    Telemedicine Visit: The patient's condition can be safely assessed and treated via synchronous audio and visual telemedicine encounter.      Reason for Telemedicine Visit: Patient has requested telehealth visit    Originating Site (Patient Location): Patient's home    Distant Site (Provider Location): Provider Remote Setting- Home Office    Consent:  The patient/guardian has verbally consented to: the potential risks and benefits of telemedicine (video visit) versus in person care; bill my insurance or make self-payment for services provided; and responsibility for payment of non-covered services.     Patient would like the video invitation sent by:  My Chart    Mode of Communication:  Video Conference via [facility]    As the provider I attest to compliance with applicable laws and regulations related to telemedicine.    Visit Activities (Refresh list every visit): [facility] Only    Diagnostic Assessment Date: 2/28/24 (updated)  Treatment Plan Review Date: 8/28/24  See Flowsheets for today's PHQ-9 and TIFFANY-7 results  Previous PHQ-9:       6/26/2024     9:39 AM 7/31/2024     2:18 PM 8/11/2024    11:54 AM   PHQ-9 SCORE   PHQ-9 Total Score [facility] 1 (Minimal depression) 2 (Minimal depression) 1 (Minimal depression)   PHQ-9 Total Score 1 2 1     Previous TIFFANY-7:       2/25/2024     1:22 PM 4/23/2024     3:26 PM 4/25/2024     1:56 PM   TIFFANY-7 SCORE   Total Score 3 (minimal anxiety) 2  "(minimal anxiety) 0 (minimal anxiety)   Total Score 3 2 0       KARINA LEVEL:      8/27/2010     3:00 PM 2/22/2011    11:00 AM   KARINA Score (Last Two)   KARINA Raw Score 51 49   Activation Score 91.6 82.8   KARINA Level 4 4       DATA  Extended Session (60+ minutes): No  Interactive Complexity: No  Crisis: No  [facility] Patient: No    Treatment Objective(s) Addressed in This Session:  Target Behavior(s):  anxiety    Anxiety: will experience a reduction in anxiety and will develop more effective coping skills to manage anxiety symptoms    Current Stressors / Issues:  8/12/2024  She says that \"anxiety is unpredictable\" and she likes to be in control of things. In a stressful situation with her  she reports she experienced no anxiety. She has thoughts about what if's related to picking up her grandmother's car \"[employer]\". Pt expresses that she is not sure why she has so many what if's and that she still plans to  the car. [facility] explains that the anxiety is there because she cares and values the car and memories she has attached to her grandma, if she care or it didn't matter it would be a brief thought and leave her mind. Pt was able to be present for VBS and didn't have any anxiety. [facility] explains that this may be due to being present and in the moment where we are not worried about the past or future. [place]a is aware of anxiety triggers and upon recognizing them she will sit down relaxing and use the Calm lorena. She woke up with a head ache and has been some mornings. To help cope with this, [facility] encourages her to do a body scan and to use PMR without causing pain or injury and any area that experiences pain to move on. [facility] demonstrates this with pt. When she asks about ways to help with tightness in her chest, [facility] recommends to roll shoulders back and do a power pose to help since with anxiety we will tend to pull inward.     [facility] explains that Erika will be back in 2 weeks and will talk with pt about plans to continue " therapy and to reach out to [facility] and this [facility] before this if she needs anything.       7/24/2024  Pt reports that she just found out a bit before the visit that a family member passed away, she is still processing this loss. She has been working on her to-do list for the week and that lists help to manage stress and there relief of crossing things out. Pt has continued to try to be in the present and will use self talk if she gets off track.   [facility] provides pr with a sounds lorena Atmopshere and talks about relaxing sounds. [facility] provides psychoeducation to pt that meditating in her chair and feeling relaxed and calm is because her brain has made this association. [facility] recommends pt when she is feeling anxious close eyes and imagine being in the room sitting in the chair and the feel, sounds she hears, things she sees and smells.       7/8/2024  Pt gained insight that they jump from thought to thought. She used a lorena to practice deep breathing when feeling anxious. Unexpected events coming up invoke anxiety for pt. Pt used self talk with family members dropped in unexpectedly. She recognizes that anxiety won't last forever. Pt has talked with others about how they experience anxiety and knowing that they aren't alone helps provide comfort.     6/11/2024  Pt says that things have gone well. She checked in and started the visit she said that she didn't finish the questions and she was able to manage this. When pt has anxiety the first thing they want to do is go outside. 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 Pt has read a lot of books Erika provided to pt. She says that her and her PCP agree that there is a bit more to get back to where she was. Head aches are regular for pt.     Pt says that checking in with pt to see how they are doing would be helpful for future visits.     5/28/2024  [place]. Reports some high anxiety with kids getting into  a fight. Was able to manage getting them back together after some conversation. She has not had anxiety like this in a while. Discussed taking time to herself to recover and processing emotions involved. She went to see friend who suffers significantly from MH. Feels the interaction was better than it has been. Continues to approach relationship with empathy as it has been difficult with her friends slow progress.       5/13/24  Reports she had a wonderful mothers day with kids. Continues to see her friend who is having mental health struggles. Struggles with her own emotions she has after seeing friend. Processed feelings of frustration and guilt. Discussed nature of present anxiety. Feels she has anxiety daily, normalized her experience. Discussed anxiety more in down times when she has more head space to think about things. She continues engage in skills for anxiety. Continues to have headaches which is frustrating to her. Discussed fears of making med changes. Encouraged her to have conversation further with MD. Discussed skills she has learned and how these can be very beneficial when making medication changes. Continues to weigh pros and cons of changing meds to hopefully improve headaches.      Progress on Treatment Objective(s) / Homework:  Satisfactory progress - ACTION (Actively working towards change); Intervened by reinforcing change plan / affirming steps taken    CBT: Pt was able to be present and in the moment and not feel anxious. She is able to recognize and identify anxiety warning signs and use skills to cope. Pt practices skills taught during today's visit with [facility].     CBT: [facility] provides psychoeducation about trauma/anxiety and how the brain responds and predispositions and genetics can play a role. [facility] provides psychoeducation about values and how they reflect in our daily life. [facility] demonstrates how to practice PMR (not to use if you have chronic pain or hurt), belly breathing and the power pose  of putting your hands on your hips to open up your chest.     Motivational Interviewing    MI Intervention: Expressed Empathy/Understanding, Supported Autonomy, Collaboration, Evocation, Open-ended questions, Change talk (evoked) and Reframe     Change Talk Expressed by the Patient: Desire to change Ability to change Reasons to change Need to change Taking steps    Provider Response to Change Talk: E - Evoked more info from patient about behavior change, A - Affirmed patient's thoughts, decisions, or attempts at behavior change, R - Reflected patient's change talk and S - Summarized patient's change talk statements      Care Plan review completed: Yes    Medication Review:  No changes to current psychiatric medication(s)    Medication Compliance:  Yes    Changes in Health Issues:   None reported    Chemical Use Review:   Substance Use: Chemical use reviewed, no active concerns identified      Tobacco Use: No current tobacco use.      Assessment: Current Emotional / Mental Status (status of significant symptoms):  Risk status (Self / Other harm or suicidal ideation)  Patient denies a history of suicidal ideation, suicide attempts, self-injurious behavior, homicidal ideation, homicidal behavior and and other safety concerns  Patient denies current fears or concerns for personal safety.  Patient denies current or recent suicidal ideation or behaviors.  Patient denies current or recent homicidal ideation or behaviors.  Patient denies current or recent self injurious behavior or ideation.  Patient denies other safety concerns.  A safety and risk management plan has not been developed at this time, however patient was encouraged to call [facility] should there be a change in any of these risk factors.    Appearance:   Appropriate , well groomed  Eye Contact:   Good   Psychomotor Behavior: Normal   Attitude:   Cooperative   Orientation:   All  Speech   Rate / Production: Normal    Volume:  Normal    Mood:    Anxious , continuing to improving  Affect:    Appropriate   Thought Content:  Clear   Thought Form:  Coherent  Logical   Insight:    Good , continuing to gain insights    Diagnoses:  1. Generalized anxiety disorder        Collateral Reports Completed:  Not Applicable    Plan: (Homework, other):  Patient was given information about behavioral services and encouraged to schedule a follow up appointment with the clinic [facility]  when Erika returns .  She was also given information about mental health symptoms and treatment options .  CD Recommendations: No indications of CD issues.  Gina Freeman, LICSW, [facility]      ______________________________________________________________________    Integrated Primary Care Behavioral Health Treatment Plan    Patient's Name: Jolynn Haji  YOB: 1955    Date of Creation: 10/5/22  Date Treatment Plan Last Reviewed/Revised: 5/28/24    DSM5 Diagnoses: 300.02 (F41.1) Generalized Anxiety Disorder  Psychosocial / Contextual Factors: Individual Factors high anxiety associated with depressive sx, interfereing with ability to function as she would like.  .  PROMIS (reviewed every 90 days):     Referral / Collaboration:  Was/were discussed and patient will pursue. - individual therapy    Anticipated number of session for this episode of care: 5-8 sessions  Anticipation frequency of session: Weekly  Anticipated Duration of each session: 38-52 minutes  Treatment plan will be reviewed in 90 days or when goals have been changed.       MeasurableTreatment Goal(s) related to diagnosis / functional impairment(s)  Goal 1: Patient will work with provider to manage symptoms    I will know I've met my goal when when less anxious and feeling down.      Objective #A (Patient Action)    Patient will   attend all sessions .  Status: Continued - Date(s): 5/28/24    Intervention(s)  Therapist will teach  educate patient on treatment options, clarify concerns, work with pt to overcome any  resistance to compliance. .    Objective #B  Patient will identify 3 fears / thoughts that contribute to feeling anxious.  Status: Continued - Date(s): 5/28/24    Intervention(s)  Therapist will  educate patient on treatment options, clarify concerns, work with pt to overcome any resistance to compliance. .    Objective #C  Patient will identify 3 initial signs or symptoms of anxiety.  Status: Continued - Date(s): 5/28/24    Intervention(s)  Therapist will  educate patient on treatment options, clarify concerns, work with pt to overcome any resistance to compliance. .        Patient has reviewed and agreed to the above plan.      Erika Francisco [facility]  May 28, 2024

## 2024-08-14 ENCOUNTER — ANCILLARY PROCEDURE (OUTPATIENT)
Dept: GENERAL RADIOLOGY | Facility: CLINIC | Age: 69
End: 2024-08-14
Attending: FAMILY MEDICINE
Payer: COMMERCIAL

## 2024-08-14 ENCOUNTER — OFFICE VISIT (OUTPATIENT)
Dept: FAMILY MEDICINE | Facility: CLINIC | Age: 69
End: 2024-08-14
Payer: COMMERCIAL

## 2024-08-14 VITALS
DIASTOLIC BLOOD PRESSURE: 68 MMHG | RESPIRATION RATE: 16 BRPM | SYSTOLIC BLOOD PRESSURE: 108 MMHG | OXYGEN SATURATION: 98 % | HEART RATE: 89 BPM | WEIGHT: 149 LBS | TEMPERATURE: 98.6 F | HEIGHT: 68 IN | BODY MASS INDEX: 22.58 KG/M2

## 2024-08-14 DIAGNOSIS — M25.562 CHRONIC PAIN OF LEFT KNEE: ICD-10-CM

## 2024-08-14 DIAGNOSIS — G89.29 CHRONIC RIGHT SHOULDER PAIN: ICD-10-CM

## 2024-08-14 DIAGNOSIS — F43.23 ADJUSTMENT DISORDER WITH MIXED ANXIETY AND DEPRESSED MOOD: ICD-10-CM

## 2024-08-14 DIAGNOSIS — G89.29 CHRONIC PAIN OF LEFT KNEE: ICD-10-CM

## 2024-08-14 DIAGNOSIS — I10 ESSENTIAL HYPERTENSION WITH GOAL BLOOD PRESSURE LESS THAN 140/90: ICD-10-CM

## 2024-08-14 DIAGNOSIS — M25.511 CHRONIC RIGHT SHOULDER PAIN: ICD-10-CM

## 2024-08-14 DIAGNOSIS — F51.02 ADJUSTMENT INSOMNIA: ICD-10-CM

## 2024-08-14 DIAGNOSIS — M17.12 PRIMARY OSTEOARTHRITIS OF LEFT KNEE: Primary | ICD-10-CM

## 2024-08-14 DIAGNOSIS — M17.12 PRIMARY OSTEOARTHRITIS OF LEFT KNEE: ICD-10-CM

## 2024-08-14 PROCEDURE — 73560 X-RAY EXAM OF KNEE 1 OR 2: CPT | Mod: TC | Performed by: RADIOLOGY

## 2024-08-14 PROCEDURE — 20610 DRAIN/INJ JOINT/BURSA W/O US: CPT | Performed by: FAMILY MEDICINE

## 2024-08-14 PROCEDURE — 99214 OFFICE O/P EST MOD 30 MIN: CPT | Mod: 25 | Performed by: FAMILY MEDICINE

## 2024-08-14 PROCEDURE — 73564 X-RAY EXAM KNEE 4 OR MORE: CPT | Mod: TC | Performed by: RADIOLOGY

## 2024-08-14 RX ORDER — TRIAMCINOLONE ACETONIDE 40 MG/ML
40 INJECTION, SUSPENSION INTRA-ARTICULAR; INTRAMUSCULAR ONCE
Status: COMPLETED | OUTPATIENT
Start: 2024-08-14 | End: 2024-08-14

## 2024-08-14 RX ADMIN — TRIAMCINOLONE ACETONIDE 40 MG: 40 INJECTION, SUSPENSION INTRA-ARTICULAR; INTRAMUSCULAR at 17:41

## 2024-08-14 RX ADMIN — TRIAMCINOLONE ACETONIDE 40 MG: 40 INJECTION, SUSPENSION INTRA-ARTICULAR; INTRAMUSCULAR at 17:38

## 2024-08-14 NOTE — PATIENT INSTRUCTIONS
".   [facility]  [address] [place] [address]  Office: [phone]   Fax:    [phone]     Please, call our clinic or go to the ER immediately if signs or symptoms worsen or fail to improve as anticipated.     Thank you so much or choosing [facility]  for your Health Care. It was a pleasure seeing you at your visit today! Please contact us with any questions or concerns you may have.                   Helga Ibarra MD                              To reach your [facility] care team after hours call:   [phone] press #2 \"to speak with your care team\".  This will get you to our clinic instead of routing to central [facility]  scheduling.     PLEASE NOTE OUR HOURS HAVE CHANGED secondary to COVID-19 coronavirus pandemic, as we are trying to minimize patient exposure to the virus,  which is now widespread in the [facility].  These hours may change with very little notice.  We apologize for any inconvenience.       Our current clinic hours are:          Monday- Thursday   7:00am - 6:00pm  in person.      Friday  7:00am- 5:00pm                       Saturday and Sunday : Closed to in person and virtual visits        We have telephone and virtual visit times available between    7:00am - 6pm on Monday-Friday as well.                                                Phone:  [phone]      Our pharmacy hours: Monday through Friday 8:00am to 5:00pm                        Saturday - 9:00 am to 12 noon       Sunday : Closed.              Phone:  [phone]              ###  Please note: at this time we are not accepting any walk-in visits. ###      There is also information available at our web site:  [email or web address]    If your provider ordered any lab tests and you do not receive the results within 10 business days, please call the clinic.    If you need a medication refill please contact your pharmacy.  Please " allow 3 business days for your refill to be completed.    Our clinic offers telephone visits and e visits.  Please ask one of your team members to explain more.      Use MyChart (secure email communication and access to your chart) to send your primary care provider a message or make an appointment. Ask someone on your Team how to sign up for [facility].

## 2024-08-14 NOTE — PROGRESS NOTES
[facility]  [address] [place] [address]  Office: [phone]   Fax:    [phone]      Assessment & Plan :      ICD-10-CM    1. Primary osteoarthritis of left knee  M17.12 XR Knee Standing 2 Vw Bilat and 2 Vw Left     triamcinolone (KENALOG-40) injection 40 mg     lidocaine 1 % 1 mL     DRAIN/INJECT LARGE JOINT/BURSA      2. Chronic pain of left knee  M25.562 XR Knee Standing 2 Vw Bilat and 2 Vw Left    G89.29 triamcinolone (KENALOG-40) injection 40 mg     lidocaine 1 % 1 mL     DRAIN/INJECT LARGE JOINT/BURSA      3. Adjustment disorder with mixed anxiety and depressed mood- mild  at this time  F43.23       4. Adjustment insomnia - resolved as of 5/5/2023 - off of ambien with psychiatry's okay   F51.02       5. Chronic right shoulder pain- mild clicking feeling - not really a pain, more slight discomfort- no known injury  M25.511     G89.29       6. Essential hypertension with goal blood pressure less than 140/90- getting some symptomatic hypotension episodes on 2.5mg lisinopril daily  I10            Go ahead and stop your lisinopril secondary to low blood pressures.    Stop your trazodone and see how you feel.     Please, call our clinic or go to the ER immediately if signs or symptoms worsen or fail to improve as anticipated.     Return in about 2 months (around 10/14/2024) for depression, anxiety,  and knee pain , blood pressure, in person, w/ Dr. SUE for 40 min appt.     Keep your next appointment with Dr. Pompa for your mood in 9/2024.   Ordering of each unique test  Prescription drug management  35  minutes spent by me on the date of the encounter doing chart review, history and exam, documentation and further activities per the note.  5 minutes spent in addition to that time on joint injection, including obtaining verbal informed consent.             BMI:   Estimated body mass index is 22.99 kg/m  as calculated from the following:    Height as of this encounter:  "1.715 m (5' 7.5\").    Weight as of this encounter: 67.6 kg (149 lb).       MEDICATIONS:   Orders Placed This Encounter   Medications    triamcinolone (KENALOG-40) injection 40 mg    lidocaine 1 % 1 mL          - Continue other medications without change  Work on weight loss  Regular exercise  See Patient Instructions    Return in about 2 months (around 10/14/2024) for depression, anxiety,  and knee pain , blood pressure, in person, w/ Dr. SUE for 40 min appt.    Future Appointments 8/14/2024 - 2/10/2025        Date Visit Type Length Department Provider     [facility]26/2024  2:30 PM [facility] Mica George LP[facility]    Location Instructions:     [facility] has 2 buildings on its campus. Please visit us at [address] [place] [address] and enter the building with the numbers [id number] on it.               9/26/2024 10:00 AM [facility] White, Nicole, LPCC    Location Instructions:     [facility] has 2 buildings on its campus. Please visit us at [address] [place] [address] and enter the building with the numbers [id number] on it.               9/26/2024 10:30 AM [facility] ADULT PSYCHIATRY RETURN 30 min [facility] PSYCHIATRY Judy Pompa DO    Location Instructions:     [facility] has 2 buildings on its campus. Please visit us at [address] [place] [address] and enter the building with the numbers [id number] on it.               10/14/2024  4:00 PM OFFICE VISIT 40 min  FAMILY PRACTICE Helga Ibarra MD    Location Instructions:     [facility] is located at [address] [place] [address], along [address] Free parking is available; access the lot by turning north from [address] onto [address], then west onto [address].                            Helga Ibarra MD  [facility]        Chanelle Moreaujhoana LAURO Haji 68 year old female , presenting for " "the following health issues:  Recheck Medication    1. Primary osteoarthritis of left knee    2. Chronic pain of left knee    3. Adjustment disorder with mixed anxiety and depressed mood- mild  at this time    4. Adjustment insomnia - resolved as of 5/5/2023 - off of ambien with psychiatry's okay     5. Chronic right shoulder pain- mild clicking feeling - not really a pain, more slight discomfort- no known injury    6. Essential hypertension with goal blood pressure less than 140/90- getting some symptomatic hypotension episodes on 2.5mg lisinopril daily           History of Present Illness       Mental Health Follow-up:  Patient presents to follow-up on Anxiety.    Patient's anxiety since last visit has been:  Good  The patient is having other symptoms associated with anxiety.  Any significant life events: No  Patient is feeling anxious or having panic attacks.  Patient has no concerns about alcohol or drug use.    Headaches:   Since the patient's last clinic visit, headaches are: no change  The patient is getting headaches:  Every day  She is able to do normal daily activities when she has a migraine.  The patient is taking the following rescue/relief medications:  Tylenol   Patient states \"I get some relief\" from the rescue/relief medications.   The patient is taking the following medications to prevent migraines:  No medications to prevent migraines  In the past 4 weeks, the patient has gone to an Urgent Care or Emergency Room 0 times times due to headaches.    Reason for visit:  Amxiety and depression check up.  Knee pain    She eats 2-3 servings of fruits and vegetables daily.She consumes 0 sweetened beverage(s) daily.She exercises with enough effort to increase her heart rate 20 to 29 minutes per day.  She exercises with enough effort to increase her heart rate 4 days per week.   She is taking medications regularly.     Hypertension Follow-up: taking lisinopril  2.5mg - bp's have come down /52-68     Do " you check your blood pressure regularly outside of the clinic? Yes   Are you following a low salt diet? Yes  Are your blood pressures ever more than 140 on the top number (systolic) OR more   than 90 on the bottom number (diastolic), for example 140/90? No  Discussed stopping the lisinopril again.     Wt Readings from Last 5 Encounters:   08/14/24 67.6 kg (149 lb)   04/25/24 67.6 kg (149 lb)   02/05/24 67.6 kg (149 lb)   12/06/23 66.7 kg (147 lb)   09/27/23 65.3 kg (144 lb)         Knee Pain-continues to bother her, typically gets injections done for this in left Knee .  Last one was 12/6/2023.  Knee pain is limiting your activities.  Has signif. osteoarthritis left knee.   Last xrays were  7/1/2022:IMPRESSION:  1.  Left knee: Moderate degenerative arthrosis, stable. This includes  medial compartment narrowing, patellofemoral compartment narrowing,  tricompartmental marginal osteophytosis. No fracture or joint  effusion. Bone demineralization.  2.  Right knee: Mild degenerative arthrosis. Bone demineralization. No  fracture.    Would like to get repeat xrays and get another injection today.     Going to sleep at 10:05pm and asleep by 10:06pm.  Sometimes gets up at around 0300 to pee, then gets up around 7278-7295 and awakens feeling rested.  Just taking 1/2 of a 50 mg trazodone tablet and will try to stop that now.       Patient Active Problem List   Diagnosis    Essential hypertension with goal blood pressure less than 140/90    Hidradenitis    Localized osteoarthritis of hand    Synovial cyst of popliteal space    DERMATOPHYTOSIS OF NAIL- toenails     Lipidosis    Osteopenia    Postmenopausal    NUMBNESS AND TINGLING OF FOOT - bilateral -mild     Numbness of feet- distal feet R>L     Stress incontinence, female - mild    Cough    Acute bronchitis- recurrent- ? related to mold in school building- pt has since retired and no further bronchitis    Rosacea    History of migraine headaches- assoc. with nausea/vomiting  - resolved around menopause - were  premenstrual     Dupuytren's disease of palm - right 4th digit flexor tendon    Sun-damaged skin    Seasonal allergic rhinitis    Basal cell carcinoma of leg, right    Adjustment disorder with mixed anxiety and depressed mood- mild  at this time    Family history of celiac disease    TIFFANY (generalized anxiety disorder)    Insomnia, unspecified type    Chronic constipation    Raynaud's phenomenon without gangrene    Other secondary osteoarthritis of left knee    Primary localized osteoarthrosis of left lower leg    Hyperlipidemia LDL goal <130    Basal cell carcinoma    Colon polyps    Screening for cervical cancer    Adjustment insomnia - resolved as of 5/5/2023 - off of ambien with psychiatry's okay     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 - much improved - resolved as of 5/5/2023     Drug-induced parkinsonism (H)- Abilify - generic = ARIPiprazole( 5mg) - RESOLVED OFF ABILIFY     Mood disorder (H24)    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 - lower pretibial area     Bilateral lower extremity edema- trace pitting edema distal LE to mid pretibial areas Left > right    Tremor    Major depressive disorder, recurrent, mild (H24)       Current Outpatient Medications   Medication Sig Dispense Refill    clindamycin (CLEOCIN T) 1 % external lotion Apply topically 2 times daily 60 mL 3    lactobacillus rhamnosus, GG, (CULTURELL) capsule Take 1 capsule by mouth daily Taking every other day      lisinopril (ZESTRIL) 2.5 MG tablet Take 1 tablet (2.5 mg) by mouth daily 90 tablet 3    propranolol ER (INDERAL LA) 60 MG 24 hr capsule Take 1 capsule (60 mg) by mouth daily 90 capsule 3    traZODone (DESYREL) 50 MG tablet Take 0.5-1 tablets (25-50 mg) by mouth nightly as needed for sleep 90 tablet 3    venlafaxine (EFFEXOR XR) 75 MG 24 hr capsule Take 1 capsule (75 mg) by mouth daily 90  "capsule 3          Allergies   Allergen Reactions    Abilify [Aripiprazole] Other (See Comments)     Suspected drug-induced Parkinsonism     Fosamax Diarrhea     Diarrhea, stomach cramps, jaw pain     Amoxicillin-Pot Clavulanate Itching and Rash    Amoxicillin-Pot Clavulanate Itching and Rash          Review of Systems :   Constitutional, HEENT, cardiovascular, pulmonary, GI, , musculoskeletal, neuro, skin, endocrine and psych systems are negative, except as otherwise noted.      Objective    /68   Pulse 89   Temp 98.6  F (37  C) (Tympanic)   Resp 16   Ht 1.715 m (5' 7.5\")   Wt 67.6 kg (149 lb)   LMP 06/28/2001   SpO2 98%   BMI 22.99 kg/m    Body mass index is 22.99 kg/m .  Physical Exam   GENERAL: alert and no distress  EYES: Eyes grossly normal to inspection, PERRL and conjunctivae and sclerae normal  HENT: ear canals and TM's normal, nose and mouth without ulcers or lesions  NECK: no adenopathy, no asymmetry, masses, or scars  RESP: lungs clear to auscultation - no rales, rhonchi or wheezes  CV: regular rate and rhythm, normal S1 S2, no S3 or S4, no murmur, click or rub, no peripheral edema  ABDOMEN: soft, nontender, no hepatosplenomegaly, no masses and bowel sounds normal  MS: no gross musculoskeletal defects noted, no edema, pt has full range of motion of the shoulder at this time.   SKIN: no suspicious lesions or rashes  NEURO: Normal strength and tone, mentation intact and speech normal  PSYCH: mentation appears normal, affect normal/bright  Exam of left knee shows: mild joint enlargement - no acute effusion. Knees reveal near full range of motion - mildly limited full extension on the left compared with the right , mild medial left joint line  tenderness, no masses, no matt effusion or ligamentous instability.     Xray - Reviewed and interpreted by me.  Mild increase in medial joint osteoarthritis bilaterally compared with 2022 images. ? Mild increase in retropatellar osteoarthritis as well. " Some mild spurring left distal medial femoral condyle and left medial superior tibial condyle.      Knee joint injection:   After risks of bleeding, infection, ligament/tendon rupture, and loss of limb were fully discussed and questions answered, verbal consent was obtained. using sterile technique the knee was prepped with isopropyl alcohol and betadine for  a medial infrapatellar approach. The knee joint was flexed and Steroid kenalog 40  mg (400mg/10ml = 40mg/ml) = 1 ml and 4 ml  plain 1% Lidocaine was then injected via a medial infrapatellar approach.and the needle withdrawn.  The procedure was well tolerated with little to no discomfort per patient report.  The patient is asked to continue to rest the knee for a few more days before resuming regular activities.  It may be more painful for the first 1-2 days.  Watch for fever, redness, or increased swelling or persistent pain in knee. Call or return to clinic prn if such symptoms occur or the knee fails to improve as anticipated.  Knee pain improved after injection.

## 2024-08-26 ENCOUNTER — VIRTUAL VISIT (OUTPATIENT)
Dept: BEHAVIORAL HEALTH | Facility: CLINIC | Age: 69
End: 2024-08-26
Payer: COMMERCIAL

## 2024-08-26 DIAGNOSIS — F41.1 GENERALIZED ANXIETY DISORDER: Primary | ICD-10-CM

## 2024-08-26 PROCEDURE — 90832 PSYTX W PT 30 MINUTES: CPT | Mod: 95 | Performed by: COUNSELOR

## 2024-08-26 NOTE — PROGRESS NOTES
[facility] Psychiatry Services [facility]  August 26, 2024      Behavioral Health Clinician Progress Note    Patient Name: Jolynn Haji           Service Type:  Individual      Service Location:   MyChart / Email (patient reached)     Session Start Time: 225pm  Session End Time: 257pm      Session Length: 16 - 37      Attendees: Client     Service Modality:  Video Visit:      Provider verified identity through the following two step process.  Patient provided:  Patient is known previously to provider    Telemedicine Visit: The patient's condition can be safely assessed and treated via synchronous audio and visual telemedicine encounter.      Reason for Telemedicine Visit: Services only offered telehealth    Originating Site (Patient Location): Patient's home    Distant Site (Provider Location): Provider Remote Setting- Home Office    Consent:  The patient/guardian has verbally consented to: the potential risks and benefits of telemedicine (video visit) versus in person care; bill my insurance or make self-payment for services provided; and responsibility for payment of non-covered services.     Patient would like the video invitation sent by:  My Chart    Mode of Communication:  Video Conference via Amwell    Distant Location (Provider):  Off-site    As the provider I attest to compliance with applicable laws and regulations related to telemedicine.    Visit Activities (Refresh list every visit): [facility] Only    Diagnostic Assessment Date: 2/28/24 Mica George MA [facility]  Treatment Plan Review Date: 6/26/24  See Flowsheets for today's PHQ-9 and TIFFANY-7 results  Previous PHQ-9:       7/31/2024     2:18 PM 8/11/2024    11:54 AM 8/13/2024     3:14 PM   PHQ-9 SCORE   PHQ-9 Total Score MyChart 2 (Minimal depression) 1 (Minimal depression) 1 (Minimal depression)   PHQ-9 Total Score 2 1 1     Previous TIFFANY-7:       4/23/2024     3:26 PM 4/25/2024     1:56 PM 8/12/2024     2:54 PM   TIFFANY-7 SCORE   Total Score 2  (minimal anxiety) 0 (minimal anxiety) 1 (minimal anxiety)   Total Score 2 0 1     DATA  Extended Session (60+ minutes): No  Interactive Complexity: No  Crisis: No  [facility] Patient: No    Treatment Objective(s) Addressed in This Session:  Target Behavior(s): disease management/lifestyle changes reducing anxiety    Anxiety: will experience a reduction in anxiety, will develop more effective coping skills to manage anxiety symptoms, and will develop healthy cognitive patterns and beliefs    Current Stressors / Issues:  8/26 [facility] only  VBS was successful   Attended family wedding  Friends coming to town  Discussed anxiety mgmt during transitions  Discussed path of anxiety tx      7/31 [facility] only  Staying busy birthday parties  VBS next week  Being highly social  Son might have tick bite sx- high anxiety, calling him numerous times.  Discussed catatrophization/behaviors  Discussed people pleasing    6/29   update:  Going really well.  Have on going headaches.  I feels manageable however.  Way less brain fog.    Stresses:  Had to put in new furnaces and air conditioners.   Appetite: N/a   Sleep:  N/a  Outpatient Provider updates: Erika Mcmahon leave temporarily Nathalie MONTIEL [facility]  SI/SIB/HI: N/a  FARIDA: Denies  Side effects/compliance: N/a  Interventions:  [facility] discussed anxiety mgmt and TIPP skills/decision paralysis.   Most important:  Doing well!    Warning signs: not energy, not being social, not having odessa    2/28   update:  Reduced effexor since last visit.  On going well.  Brain fog gone.  Stresses:  Headache pain is reduced.  Appetite: Denies  Sleep: Trazadone works, no problem  Outpatient Provider updates: [facility] Erika OCHOA  Uses CALM lorena daily with afternoon anxiety  SI/SIB/HI:  Denies past attempts.  Previous passive ideation  FARIDA:  Denies  Side effects/compliance:  N/a  Interventions:  [facility] engaged in completing DA with psychoeducation with treatment planning  Most important: Med changes are great.    Does homework and books  to read.    Uses CALM lorena  5/4/3/2/1  May I skills  Journaling  Putting thoughts in box, for another day  Word lorena  Making choices can be difficult sets limits      Progress on Treatment Objective(s) / Homework:  New Objective established this session - CONTEMPLATION (Considering change and yet undecided); Intervened by assessing the negative and positive thinking (ambivalence) about behavior change    Motivational Interviewing    MI Intervention: Co-Developed Goal: reducing anxiety and Expressed Empathy/Understanding     Change Talk Expressed by the Patient: Desire to change Ability to change    Provider Response to Change Talk: E - Evoked more info from patient about behavior change and A - Affirmed patient's thoughts, decisions, or attempts at behavior change    Also provided psychoeducation about behavioral health condition, symptoms, and treatment options    Assessments completed prior to visit:  The following assessments were completed by patient for this visit:  PHQ2:       5/3/2023     1:18 PM 3/21/2023    11:20 AM 2/23/2023     7:15 PM 1/31/2023     3:21 PM 1/12/2023     4:28 PM 12/15/2022     9:12 PM 12/8/2022     9:38 AM   PHQ-2 ( 1999 [employer])   Q1: Little interest or pleasure in doing things   0 0 0  0   Q2: Feeling down, depressed or hopeless   0 0 0  0   PHQ-2 Score   0 0 0  0   Q1: Little interest or pleasure in doing things   Not at all Not at all Not at all  Not at all   Q2: Feeling down, depressed or hopeless   Not at all Not at all Not at all  Not at all   PHQ-2 Score Incomplete Incomplete 0 0 0 Incomplete 0     GAD2:       2/25/2024     2:08 PM 4/2/2024     8:20 PM 5/9/2024     6:47 PM 6/9/2024     8:47 PM 6/23/2024     7:59 PM 7/21/2024     8:03 PM 7/29/2024    11:49 AM   TIFFANY-2   Feeling nervous, anxious, or on edge 2 1 1 1 1 1 1   Not being able to stop or control worrying 0 0 0 0 0 0 0   TIFFANY-2 Total Score 2 1 1 1 1    1 1 1       Care Plan review completed: Yes    Medication Review:  Changes  to psychiatric medications, see updated Medication List in EPIC.     Medication Compliance:  Yes    Changes in Health Issues:   None reported    Chemical Use Review:   Substance Use: Chemical use reviewed, no active concerns identified      Tobacco Use: No current tobacco use.      Assessment: Current Emotional / Mental Status (status of significant symptoms):  Risk status (Self / Other harm or suicidal ideation)  Patient denies a history of suicidal ideation, suicide attempts, self-injurious behavior, homicidal ideation, homicidal behavior, and and other safety concerns  Patient denies current fears or concerns for personal safety.  Patient denies current or recent suicidal ideation or behaviors.  Patient denies current or recent homicidal ideation or behaviors.  Patient denies current or recent self injurious behavior or ideation.  Patient denies other safety concerns.  A safety and risk management plan has not been developed at this time, however patient was encouraged to call [place] [phone] should there be a change in any of these risk factors.    Appearance:   Appropriate   Eye Contact:   Good   Psychomotor Behavior: Normal   Attitude:   Cooperative   Orientation:   All  Speech   Rate / Production: Normal    Volume:  Normal   Mood:    Normal  Affect:    Appropriate   Thought Content:  Clear   Thought Form:  Coherent  Logical   Insight:    Good     Diagnoses:  1. Generalized anxiety disorder            Collateral Reports Completed:  Communicated with: Dr Pompa    Plan: (Homework, other):  Patient was given information about behavioral services and encouraged to schedule a follow up appointment with the clinic [facility] as needed.  She was also given information about mental health symptoms and treatment options .  CD Recommendations: No indications of CD issues.       Mica George [facility]    ______________________________________________________________________    Integrated Primary Care Behavioral Health Treatment  Plan    Patient's Name: Jolynn Haji  YOB: 1955    Date of Creation: 6/26/24  Date Treatment Plan Last Reviewed/Revised: 6/26/24    DSM5 Diagnoses: 300.02 (F41.1) Generalized Anxiety Disorder  Psychosocial / Contextual Factors: interpersonal   PROMIS (reviewed every 90 days):    PROMIS-10 Scores      10/8/2023     6:00 PM 1/8/2024    11:52 AM 1/15/2024     7:25 PM 2/25/2024     2:04 PM 5/27/2024     7:01 PM 6/9/2024     8:48 PM 6/23/2024     8:01 PM   PROMIS-10 Scores Only   Global Mental Health Score 17 17 17 17 17 17 17    17   Global Physical Health Score 16 19 16 17 19 19 19    19   PROMIS TOTAL - SUBSCORES 33 36 33 34 36 36 36    36         Referral / Collaboration:  Referral to another professional/service is not indicated at this time..    Anticipated number of session for this episode of care: 5-6  Anticipation frequency of session: Monthly  Anticipated Duration of each session: 16-37 minutes  Treatment plan will be reviewed in 90 days or when goals have been changed.       MeasurableTreatment Goal(s) related to diagnosis / functional impairment(s)  Goal 1: Patient will reduce sx of anxiety    I will know I've met my goal when I can stay in the moment and not worry.      Objective #A (Patient Action)    Patient will use thought-stopping strategy daily to reduce intrusive thoughts.  Status: New - Date: 6/26/24      Intervention(s)  Therapist will provide support through CBT, MI, Acceptance and Commitment Therapy, Dialectic Behavioral Therapy and problem solving model to explore and overcome barriers.        Patient has reviewed and agreed to the above plan.      Mica George, NARESH  August 26, 2024

## 2024-09-09 NOTE — PROGRESS NOTES
[facility]  September 11, 2024      Behavioral Health Clinician Progress Note    Patient Name: Jolynn Haji           Service Type:  Individual      Service Location:   MyChart / Email (patient reached)     Session Start Time: 12:30 pm   Session End Time: 1:20 pm      Session Length: 38 - 52      Attendees: Patient     Service Modality:  Video Visit:      Provider verified identity through the following two step process.  Patient provided:  Patient is known previously to provider    Telemedicine Visit: The patient's condition can be safely assessed and treated via synchronous audio and visual telemedicine encounter.      Reason for Telemedicine Visit: Patient has requested telehealth visit    Originating Site (Patient Location): Patient's home    Distant Site (Provider Location): [facility]    Consent:  The patient/guardian has verbally consented to: the potential risks and benefits of telemedicine (video visit) versus in person care; bill my insurance or make self-payment for services provided; and responsibility for payment of non-covered services.     Patient would like the video invitation sent by:  My Chart    Mode of Communication:  Video Conference via Amwell    As the provider I attest to compliance with applicable laws and regulations related to telemedicine.    Visit Activities (Refresh list every visit): [facility] Only    Diagnostic Assessment Date: 2/28/24 (updated)  Treatment Plan Review Date: 12/11/24  See Flowsheets for today's PHQ-9 and TIFFANY-7 results  Previous PHQ-9:       7/31/2024     2:18 PM 8/11/2024    11:54 AM 8/13/2024     3:14 PM   PHQ-9 SCORE   PHQ-9 Total Score MyChart 2 (Minimal depression) 1 (Minimal depression) 1 (Minimal depression)   PHQ-9 Total Score 2 1 1     Previous TIFFANY-7:       4/23/2024     3:26 PM 4/25/2024     1:56 PM 8/12/2024     2:54 PM   TIFFANY-7 SCORE   Total Score 2 (minimal anxiety) 0  "(minimal anxiety) 1 (minimal anxiety)   Total Score 2 0 1       KARINA LEVEL:      8/27/2010     3:00 PM 2/22/2011    11:00 AM   KARINA Score (Last Two)   KARINA Raw Score 51 49   Activation Score 91.6 82.8   KARINA Level 4 4       DATA  Extended Session (60+ minutes): No  Interactive Complexity: No  Crisis: No  [facility] Patient: No    Treatment Objective(s) Addressed in This Session:  Target Behavior(s):  anxiety and depression    Depressed Mood: Increase interest, engagement, and pleasure in doing things  Decrease frequency and intensity of feeling down, depressed, hopeless  Improve quantity and quality of night time sleep / decrease daytime naps  Feel less tired and more energy during the day   Improve diet, appetite, mindful eating, and / or meal planning  Identify negative self-talk and behaviors: challenge core beliefs, myths, and actions  Improve concentration, focus, and mindfulness in daily activities   Feel less fidgety, restless or slow in daily activities / interpersonal interactions  Anxiety: will experience a reduction in anxiety, will develop more effective coping skills to manage anxiety symptoms, will develop healthy cognitive patterns and beliefs and will increase ability to function adaptively    Current Stressors / Issues:  Pt reports having a down day, several days to be exact. Discussed fears around what the meaning of \"a down day\" means to her. Pt is fearful when she has those days that she is going backwards. Discussed expectations for progress with mental health and more specifically anxiety. Pt states having more difficult time with physical sx following a busier day or more anxious day. Continues to have strong headaches following anxiety events or difficult days. Pt is looking for permission to say no to things, however provided her with analogy of putting on ones own oxygen mask first. Discussed taking on responsibility for others \"good time or disappointment\". Finally concluded with measuring progress " differently, looking at perspectives, progress is not linear, and allowing bad days to just be an off day rather than a defining curser on overall progress.        Progress on Treatment Objective(s) / Homework:  Satisfactory progress - ACTION (Actively working towards change); Intervened by reinforcing change plan / affirming steps taken    Motivational Interviewing    MI Intervention: Expressed Empathy/Understanding, Supported Autonomy, Collaboration, Evocation, Open-ended questions, Change talk (evoked) and Reframe     Change Talk Expressed by the Patient: Desire to change Ability to change Reasons to change Need to change    Provider Response to Change Talk: E - Evoked more info from patient about behavior change, A - Affirmed patient's thoughts, decisions, or attempts at behavior change, R - Reflected patient's change talk and S - Summarized patient's change talk statements      Care Plan review completed: Yes    Medication Review:  No changes to current psychiatric medication(s)    Medication Compliance:  Yes    Changes in Health Issues:   None reported    Chemical Use Review:   Substance Use: Chemical use reviewed, no active concerns identified      Tobacco Use: No current tobacco use.      Assessment: Current Emotional / Mental Status (status of significant symptoms):  Risk status (Self / Other harm or suicidal ideation)  Patient denies a history of suicidal ideation, suicide attempts, self-injurious behavior, homicidal ideation, homicidal behavior and and other safety concerns  Patient denies current fears or concerns for personal safety.  Patient denies current or recent suicidal ideation or behaviors.  Patient denies current or recent homicidal ideation or behaviors.  Patient denies current or recent self injurious behavior or ideation.  Patient denies other safety concerns.  A safety and risk management plan has not been developed at this time, however patient was encouraged to call [facility]  should there be a change in any of these risk factors.    Appearance:   Appropriate   Eye Contact:   Good   Psychomotor Behavior: Normal   Attitude:   Cooperative   Orientation:   All  Speech   Rate / Production: Normal    Volume:  Normal   Mood:    Anxious   Affect:    Appropriate   Thought Content:  Clear   Thought Form:  Coherent  Logical   Insight:    Good     Diagnoses:  1. Generalized anxiety disorder    2. Mood disorder (H24)        Collateral Reports Completed:  Not Applicable    Plan: (Homework, other):  Patient was given information about behavioral services and encouraged to schedule a follow up appointment with the clinic [facility] [place] in 1 week.  She was also given information about mental health symptoms and treatment options .  CD Recommendations: No indications of CD issues.   Erika Francisco MSW LICSW      ______________________________________________________________________    Integrated Primary Care Behavioral Health Treatment Plan    Patient's Name: Jolynn Haji  YOB: 1955    Date of Creation: 10/5/22  Date Treatment Plan Last Reviewed/Revised: 5/28/24    DSM5 Diagnoses: 300.02 (F41.1) Generalized Anxiety Disorder  Psychosocial / Contextual Factors: Individual Factors high anxiety associated with depressive sx, interfereing with ability to function as she would like.  .  PROMIS (reviewed every 90 days):     Referral / Collaboration:  Was/were discussed and patient will pursue. - individual therapy    Anticipated number of session for this episode of care: 5-8 sessions  Anticipation frequency of session: Weekly  Anticipated Duration of each session: 38-52 minutes  Treatment plan will be reviewed in 90 days or when goals have been changed.       MeasurableTreatment Goal(s) related to diagnosis / functional impairment(s)  Goal 1: Patient will work with provider to manage symptoms    I will know I've met my goal when when less anxious and feeling down.      Objective #A (Patient Action)    Patient  will   attend all sessions .  Status: Continued - Date(s): 9/11/24    Intervention(s)  Therapist will teach  educate patient on treatment options, clarify concerns, work with pt to overcome any resistance to compliance. .    Objective #B  Patient will identify 3 fears / thoughts that contribute to feeling anxious.  Status: Continued - Date(s): 9/11/24    Intervention(s)  Therapist will  educate patient on treatment options, clarify concerns, work with pt to overcome any resistance to compliance. .    Objective #C  Patient will identify 3 initial signs or symptoms of anxiety.  Status: Continued - Date(s): 9/11/24    Intervention(s)  Therapist will  educate patient on treatment options, clarify concerns, work with pt to overcome any resistance to compliance. .        Patient has reviewed and agreed to the above plan.      JOESPH Whipple  September 11, 2024      Answers submitted by the patient for this visit:  Patient Health Questionnaire (Submitted on 3/6/2024)  If you checked off any problems, how difficult have these problems made it for you to do your work, take care of things at home, or get along with other people?: Not difficult at all  PHQ9 TOTAL SCORE: 1    Answers submitted by the patient for this visit:  Patient Health Questionnaire (Submitted on 4/18/2024)  If you checked off any problems, how difficult have these problems made it for you to do your work, take care of things at home, or get along with other people?: Not difficult at all  PHQ9 TOTAL SCORE: 0    Answers submitted by the patient for this visit:  Patient Health Questionnaire (Submitted on 5/27/2024)  PHQ9 TOTAL SCORE: 0

## 2024-09-11 ENCOUNTER — VIRTUAL VISIT (OUTPATIENT)
Dept: BEHAVIORAL HEALTH | Facility: CLINIC | Age: 69
End: 2024-09-11
Payer: COMMERCIAL

## 2024-09-11 DIAGNOSIS — F41.1 GENERALIZED ANXIETY DISORDER: Primary | ICD-10-CM

## 2024-09-11 DIAGNOSIS — F39 MOOD DISORDER (H): ICD-10-CM

## 2024-09-11 PROCEDURE — 90834 PSYTX W PT 45 MINUTES: CPT | Mod: 95 | Performed by: SOCIAL WORKER

## 2024-09-23 NOTE — PROGRESS NOTES
[facility]  September 25, 2024      Behavioral Health Clinician Progress Note    Patient Name: Jolynn Haji           Service Type:  Individual      Service Location:   MyChart / Email (patient reached)     Session Start Time: 1:55 pm   Session End Time: 2:28 pm      Session Length: 16 - 37      Attendees: Patient     Service Modality:  Video Visit:      Provider verified identity through the following two step process.  Patient provided:  Patient is known previously to provider    Telemedicine Visit: The patient's condition can be safely assessed and treated via synchronous audio and visual telemedicine encounter.      Reason for Telemedicine Visit: Patient has requested telehealth visit    Originating Site (Patient Location): Patient's home    Distant Site (Provider Location): [facility]    Consent:  The patient/guardian has verbally consented to: the potential risks and benefits of telemedicine (video visit) versus in person care; bill my insurance or make self-payment for services provided; and responsibility for payment of non-covered services.     Patient would like the video invitation sent by:  My Chart    Mode of Communication:  Video Conference via Amwell    As the provider I attest to compliance with applicable laws and regulations related to telemedicine.    Visit Activities (Refresh list every visit): [facility] Only    Diagnostic Assessment Date: 2/28/24 (updated)  Treatment Plan Review Date: 12/11/24  See Flowsheets for today's PHQ-9 and TIFFANY-7 results  Previous PHQ-9:       7/31/2024     2:18 PM 8/11/2024    11:54 AM 8/13/2024     3:14 PM   PHQ-9 SCORE   PHQ-9 Total Score MyChart 2 (Minimal depression) 1 (Minimal depression) 1 (Minimal depression)   PHQ-9 Total Score 2 1 1     Previous TIFFANY-7:       4/23/2024     3:26 PM 4/25/2024     1:56 PM 8/12/2024     2:54 PM   TIFFANY-7 SCORE   Total Score 2 (minimal anxiety) 0  "(minimal anxiety) 1 (minimal anxiety)   Total Score 2 0 1       KARINA LEVEL:      8/27/2010     3:00 PM 2/22/2011    11:00 AM   KARINA Score (Last Two)   KARINA Raw Score 51 49   Activation Score 91.6 82.8   KARINA Level 4 4       DATA  Extended Session (60+ minutes): No  Interactive Complexity: No  Crisis: No  [facility] Patient: No    Treatment Objective(s) Addressed in This Session:  Target Behavior(s):  anxiety and depression    Depressed Mood: Increase interest, engagement, and pleasure in doing things  Decrease frequency and intensity of feeling down, depressed, hopeless  Improve quantity and quality of night time sleep / decrease daytime naps  Feel less tired and more energy during the day   Improve diet, appetite, mindful eating, and / or meal planning  Identify negative self-talk and behaviors: challenge core beliefs, myths, and actions  Improve concentration, focus, and mindfulness in daily activities   Feel less fidgety, restless or slow in daily activities / interpersonal interactions  Anxiety: will experience a reduction in anxiety, will develop more effective coping skills to manage anxiety symptoms, will develop healthy cognitive patterns and beliefs and will increase ability to function adaptively    Current Stressors / Issues:  Reports doing ok. Reports anxiety while at [employer] gathering today. Reports having a good time however having anxiety and desire to go home. When she has the physical sx it causes some panic. Uses lemon drops to help with anxiety in the moment. Discussed physical sx of anxiety leading to severe headaches. Discussed thinking in the moment. Sleeping well with a week of being off trazodone. Not feeling \"thick headed in the afternoon.\" Feeling good about progress. Discussed staying in the present moment when anxiety is higher.          Progress on Treatment Objective(s) / Homework:  Satisfactory progress - ACTION (Actively working towards change); Intervened by reinforcing change plan / affirming " steps taken    Motivational Interviewing    MI Intervention: Expressed Empathy/Understanding, Supported Autonomy, Collaboration, Evocation, Open-ended questions, Change talk (evoked) and Reframe     Change Talk Expressed by the Patient: Desire to change Ability to change Reasons to change Need to change    Provider Response to Change Talk: E - Evoked more info from patient about behavior change, A - Affirmed patient's thoughts, decisions, or attempts at behavior change, R - Reflected patient's change talk and S - Summarized patient's change talk statements      Care Plan review completed: Yes    Medication Review:  No changes to current psychiatric medication(s)    Medication Compliance:  Yes    Changes in Health Issues:   None reported    Chemical Use Review:   Substance Use: Chemical use reviewed, no active concerns identified      Tobacco Use: No current tobacco use.      Assessment: Current Emotional / Mental Status (status of significant symptoms):  Risk status (Self / Other harm or suicidal ideation)  Patient denies a history of suicidal ideation, suicide attempts, self-injurious behavior, homicidal ideation, homicidal behavior and and other safety concerns  Patient denies current fears or concerns for personal safety.  Patient denies current or recent suicidal ideation or behaviors.  Patient denies current or recent homicidal ideation or behaviors.  Patient denies current or recent self injurious behavior or ideation.  Patient denies other safety concerns.  A safety and risk management plan has not been developed at this time, however patient was encouraged to call [place] [phone] should there be a change in any of these risk factors.    Appearance:   Appropriate   Eye Contact:   Good   Psychomotor Behavior: Normal   Attitude:   Cooperative   Orientation:   All  Speech   Rate / Production: Normal    Volume:  Normal   Mood:    Anxious   Affect:    Appropriate   Thought Content:  Clear   Thought  Form:  Coherent  Logical   Insight:    Good     Diagnoses:  1. Generalized anxiety disorder    2. Mood disorder (H24)    3. Insomnia, unspecified type        Collateral Reports Completed:  Not Applicable    Plan: (Homework, other):  Patient was given information about behavioral services and encouraged to schedule a follow up appointment with the clinic [facility] in 1 week.  She was also given information about mental health symptoms and treatment options .  CD Recommendations: No indications of CD issues.   Erika Francisco MSW LICSW      ______________________________________________________________________    Integrated Primary Care Behavioral Health Treatment Plan    Patient's Name: Jolynn Haji  YOB: 1955    Date of Creation: 10/5/22  Date Treatment Plan Last Reviewed/Revised: 5/28/24    DSM5 Diagnoses: 300.02 (F41.1) Generalized Anxiety Disorder  Psychosocial / Contextual Factors: Individual Factors high anxiety associated with depressive sx, interfereing with ability to function as she would like.  .  PROMIS (reviewed every 90 days):     Referral / Collaboration:  Was/were discussed and patient will pursue. - individual therapy    Anticipated number of session for this episode of care: 5-8 sessions  Anticipation frequency of session: Weekly  Anticipated Duration of each session: 38-52 minutes  Treatment plan will be reviewed in 90 days or when goals have been changed.       MeasurableTreatment Goal(s) related to diagnosis / functional impairment(s)  Goal 1: Patient will work with provider to manage symptoms    I will know I've met my goal when when less anxious and feeling down.      Objective #A (Patient Action)    Patient will   attend all sessions .  Status: Continued - Date(s): 9/11/24    Intervention(s)  Therapist will teach  educate patient on treatment options, clarify concerns, work with pt to overcome any resistance to compliance. .    Objective #B  Patient will identify 3 fears / thoughts that  contribute to feeling anxious.  Status: Continued - Date(s): 9/11/24    Intervention(s)  Therapist will  educate patient on treatment options, clarify concerns, work with pt to overcome any resistance to compliance. .    Objective #C  Patient will identify 3 initial signs or symptoms of anxiety.  Status: Continued - Date(s): 9/11/24    Intervention(s)  Therapist will  educate patient on treatment options, clarify concerns, work with pt to overcome any resistance to compliance. .        Patient has reviewed and agreed to the above plan.      DINESH Whipple  September 25, 2024      Answers submitted by the patient for this visit:  Patient Health Questionnaire (Submitted on 3/6/2024)  If you checked off any problems, how difficult have these problems made it for you to do your work, take care of things at home, or get along with other people?: Not difficult at all  PHQ9 TOTAL SCORE: 1    Answers submitted by the patient for this visit:  Patient Health Questionnaire (Submitted on 4/18/2024)  If you checked off any problems, how difficult have these problems made it for you to do your work, take care of things at home, or get along with other people?: Not difficult at all  PHQ9 TOTAL SCORE: 0    Answers submitted by the patient for this visit:  Patient Health Questionnaire (Submitted on 5/27/2024)  PHQ9 TOTAL SCORE: 0    Answers submitted by the patient for this visit:  Patient Health Questionnaire (Submitted on 9/24/2024)  PHQ9 TOTAL SCORE: 1

## 2024-09-24 ASSESSMENT — PATIENT HEALTH QUESTIONNAIRE - PHQ9
SUM OF ALL RESPONSES TO PHQ QUESTIONS 1-9: 1
SUM OF ALL RESPONSES TO PHQ QUESTIONS 1-9: 1

## 2024-09-25 ENCOUNTER — VIRTUAL VISIT (OUTPATIENT)
Dept: BEHAVIORAL HEALTH | Facility: CLINIC | Age: 69
End: 2024-09-25
Payer: COMMERCIAL

## 2024-09-25 DIAGNOSIS — F39 MOOD DISORDER (H): ICD-10-CM

## 2024-09-25 DIAGNOSIS — F41.1 GENERALIZED ANXIETY DISORDER: Primary | ICD-10-CM

## 2024-09-25 DIAGNOSIS — G47.00 INSOMNIA, UNSPECIFIED TYPE: ICD-10-CM

## 2024-09-25 PROCEDURE — 90832 PSYTX W PT 30 MINUTES: CPT | Mod: 95 | Performed by: SOCIAL WORKER

## 2024-09-25 ASSESSMENT — PATIENT HEALTH QUESTIONNAIRE - PHQ9
SUM OF ALL RESPONSES TO PHQ QUESTIONS 1-9: 1
SUM OF ALL RESPONSES TO PHQ QUESTIONS 1-9: 1

## 2024-09-25 NOTE — PROGRESS NOTES
[facility]  September 26, 2024      Behavioral Health Clinician Progress Note    Patient Name: Jolynn Haji           Service Type:  Individual      Service Location:   [facility] / Email (patient reached)     Session Start Time: 10am  Session End Time: 1029am      Session Length: 16 - 37      Attendees: Client     Service Modality:  Video Visit:      Provider verified identity through the following two step process.  Patient provided:  Patient is known previously to provider    Telemedicine Visit: The patient's condition can be safely assessed and treated via synchronous audio and visual telemedicine encounter.      Reason for Telemedicine Visit: Services only offered telehealth    Originating Site (Patient Location): Patient's home    Distant Site (Provider Location): Provider Remote Setting- Home Office    Consent:  The patient/guardian has verbally consented to: the potential risks and benefits of telemedicine (video visit) versus in person care; bill my insurance or make self-payment for services provided; and responsibility for payment of non-covered services.     Patient would like the video invitation sent by:  My Chart    Mode of Communication:  Video Conference via [employer]well    Distant Location (Provider):  Off-site    As the provider I attest to compliance with applicable laws and regulations related to telemedicine.    Visit Activities (Refresh list every visit): [facility] Only    Diagnostic Assessment Date: 2/28/24 Mica George MA [facility]  Treatment Plan Review Date: 9/26/24  See Flowsheets for today's PHQ-9 and TIFFANY-7 results  Previous PHQ-9:       8/13/2024     3:14 PM 9/24/2024     5:44 PM 9/25/2024     2:40 PM   PHQ-9 SCORE   PHQ-9 Total Score MyChart 1 (Minimal depression) 1 (Minimal depression) 1 (Minimal depression)   PHQ-9 Total Score 1 1 1     Previous TIFFANY-7:       4/23/2024     3:26 PM 4/25/2024     1:56 PM 8/12/2024     2:54 PM   TIFFANY-7 SCORE   Total Score  2 (minimal anxiety) 0 (minimal anxiety) 1 (minimal anxiety)   Total Score 2 0 1     DATA  Extended Session (60+ minutes): No  Interactive Complexity: No  Crisis: No  [facility] Patient: No    Treatment Objective(s) Addressed in This Session:  Target Behavior(s): disease management/lifestyle changes reducing anxiety    Anxiety: will experience a reduction in anxiety, will develop more effective coping skills to manage anxiety symptoms, and will develop healthy cognitive patterns and beliefs    Current Stressors / Issues:   update:  having some headaches.  Went off trazodone.  Sleeping well without it.  Not as foggy.    Been off about a week.  Company at [employer].  Social [place] events.  Grandkids back in school.  Granddaughter tough time in school/swimming.  Interested in doing more volunteer work  Stresses:  n/a  Appetite: n/a    Sleep: Doing well.  Outpatient Provider updates: Erika MONTIEL  SI/SIB/HI: n/a  FARIDA: n/a  Side effects/compliance:  Interventions:  [facility] engaged in support around anxiety mgmt  Most important:  Doing well    8/26 [facility] only  VBS was successful   Attended family wedding  Friends coming to town  Discussed anxiety mgmt during transitions  Discussed path of anxiety tx    7/31 [facility] only  Staying busy birthday parties  VBS next week  Being highly social  Son might have tick bite sx- high anxiety, calling him numerous times.  Discussed catatrophization/behaviors  Discussed people pleasing    6/29   update:  Going really well.  Have on going headaches.  I feels manageable however.  Way less brain fog.    Stresses:  Had to put in new furnaces and air conditioners.   Appetite: N/a   Sleep:  N/a  Outpatient Provider updates: Erika RAIN-Joel leave temporarily Nathalie MONTIEL [facility]  SI/SIB/HI: N/a  Side effects/compliance: N/a  Interventions:  [facility] discussed anxiety mgmt and TIPP skills/decision paralysis.   Most important:  Doing well!  Warning signs: not energy, not being social, not having odessa    2/28   update:   Reduced effexor since last visit.  On going well.  Brain fog gone.  Stresses:  Headache pain is reduced.  Appetite: Denies  Sleep: Trazadone works, no problem  Outpatient Provider updates: [facility] Erika OCHOA  Uses CALM lorena daily with afternoon anxiety  SI/SIB/HI:  Denies past attempts.  Previous passive ideation  FARIDA:  Denies  Side effects/compliance:  N/a  Interventions:  [facility] engaged in completing DA with psychoeducation with treatment planning  Most important: Med changes are great.    Does homework and books to read.    Uses CALM lorena  5/4/3/2/1  May I skills  Journaling  Putting thoughts in box, for another day  Word lorena  Making choices can be difficult sets limits    Progress on Treatment Objective(s) / Homework:  New Objective established this session - CONTEMPLATION (Considering change and yet undecided); Intervened by assessing the negative and positive thinking (ambivalence) about behavior change    Motivational Interviewing    MI Intervention: Co-Developed Goal: reducing anxiety and Expressed Empathy/Understanding     Change Talk Expressed by the Patient: Desire to change Ability to change    Provider Response to Change Talk: E - Evoked more info from patient about behavior change and A - Affirmed patient's thoughts, decisions, or attempts at behavior change    Also provided psychoeducation about behavioral health condition, symptoms, and treatment options    Assessments completed prior to visit:  The following assessments were completed by patient for this visit:  PHQ2:       5/3/2023     1:18 PM 3/21/2023    11:20 AM 2/23/2023     7:15 PM 1/31/2023     3:21 PM 1/12/2023     4:28 PM 12/15/2022     9:12 PM 12/8/2022     9:38 AM   PHQ-2 ( 1999 [employer])   Q1: Little interest or pleasure in doing things   0 0 0  0   Q2: Feeling down, depressed or hopeless   0 0 0  0   PHQ-2 Score   0 0 0  0   Q1: Little interest or pleasure in doing things   Not at all Not at all Not at all  Not at all   Q2: Feeling down, depressed or  hopeless   Not at all Not at all Not at all  Not at all   PHQ-2 Score Incomplete Incomplete 0 0 0 Incomplete 0     GAD2:       5/9/2024     6:47 PM 6/9/2024     8:47 PM 6/23/2024     7:59 PM 7/21/2024     8:03 PM 7/29/2024    11:49 AM 9/8/2024     6:03 PM 9/23/2024     3:00 PM   TIFFANY-2   Feeling nervous, anxious, or on edge 1 1 1 1 1 1 1   Not being able to stop or control worrying 0 0 0 0 0 0 0   TIFFANY-2 Total Score 1 1 1 1 1 1 1 1    1       Care Plan review completed: Yes    Medication Review:  Changes to psychiatric medications, see updated Medication List in EPIC.     Medication Compliance:  Yes    Changes in Health Issues:   None reported    Chemical Use Review:   Substance Use: Chemical use reviewed, no active concerns identified      Tobacco Use: No current tobacco use.      Assessment: Current Emotional / Mental Status (status of significant symptoms):  Risk status (Self / Other harm or suicidal ideation)  Patient denies a history of suicidal ideation, suicide attempts, self-injurious behavior, homicidal ideation, homicidal behavior, and and other safety concerns  Patient denies current fears or concerns for personal safety.  Patient denies current or recent suicidal ideation or behaviors.  Patient denies current or recent homicidal ideation or behaviors.  Patient denies current or recent self injurious behavior or ideation.  Patient denies other safety concerns.  A safety and risk management plan has not been developed at this time, however patient was encouraged to call [place] [phone] should there be a change in any of these risk factors.    Appearance:   Appropriate   Eye Contact:   Good   Psychomotor Behavior: Normal   Attitude:   Cooperative   Orientation:   All  Speech   Rate / Production: Normal    Volume:  Normal   Mood:    Normal  Affect:    Appropriate   Thought Content:  Clear   Thought Form:  Coherent  Logical   Insight:    Good     Diagnoses:  1. Generalized anxiety disorder               Collateral Reports Completed:  Communicated with: Dr Pompa    Plan: (Homework, other):  Patient was given information about behavioral services and encouraged to schedule a follow up appointment with the clinic [facility] as needed.  She was also given information about mental health symptoms and treatment options .  CD Recommendations: No indications of CD issues.       Mica Ariel, [facility]    ______________________________________________________________________    Integrated Primary Care Behavioral Health Treatment Plan    Patient's Name: Jolynn Haji  YOB: 1955    Date of Creation: 6/26/24  Date Treatment Plan Last Reviewed/Revised: 9/26/24    DSM5 Diagnoses: 300.02 (F41.1) Generalized Anxiety Disorder  Psychosocial / Contextual Factors: interpersonal   PROMIS (reviewed every 90 days):    PROMIS-10 Scores      1/8/2024    11:52 AM 1/15/2024     7:25 PM 2/25/2024     2:04 PM 5/27/2024     7:01 PM 6/9/2024     8:48 PM 6/23/2024     8:01 PM 9/23/2024     9:33 AM   PROMIS-10 Scores Only   Global Mental Health Score 17 17 17 17 17 17    17 17    17    17   Global Physical Health Score 19 16 17 19 19 19    19 18    18    18   PROMIS TOTAL - SUBSCORES 36 33 34 36 36 36    36 35    35    35         Referral / Collaboration:  Referral to another professional/service is not indicated at this time..    Anticipated number of session for this episode of care: 5-6  Anticipation frequency of session: Monthly  Anticipated Duration of each session: 16-37 minutes  Treatment plan will be reviewed in 90 days or when goals have been changed.       MeasurableTreatment Goal(s) related to diagnosis / functional impairment(s)  Goal 1: Patient will reduce sx of anxiety    I will know I've met my goal when I can stay in the moment and not worry.      Objective #A (Patient Action)    Patient will use thought-stopping strategy daily to reduce intrusive thoughts.  Status: Continued - Date(s):9/26/24      Intervention(s)  Therapist will provide support through CBT, MI, Acceptance and Commitment Therapy, Dialectic Behavioral Therapy and problem solving model to explore and overcome barriers.        Patient has reviewed and agreed to the above plan.      Mica George, [facility]  September 26, 2024

## 2024-09-26 ENCOUNTER — VIRTUAL VISIT (OUTPATIENT)
Dept: PSYCHIATRY | Facility: CLINIC | Age: 69
End: 2024-09-26
Payer: COMMERCIAL

## 2024-09-26 ENCOUNTER — VIRTUAL VISIT (OUTPATIENT)
Dept: BEHAVIORAL HEALTH | Facility: CLINIC | Age: 69
End: 2024-09-26
Payer: COMMERCIAL

## 2024-09-26 VITALS — HEIGHT: 68 IN | WEIGHT: 147 LBS | BODY MASS INDEX: 22.28 KG/M2

## 2024-09-26 DIAGNOSIS — F41.1 GENERALIZED ANXIETY DISORDER: Primary | ICD-10-CM

## 2024-09-26 DIAGNOSIS — G47.00 INSOMNIA, UNSPECIFIED TYPE: ICD-10-CM

## 2024-09-26 DIAGNOSIS — F41.1 GAD (GENERALIZED ANXIETY DISORDER): Primary | ICD-10-CM

## 2024-09-26 PROCEDURE — 90832 PSYTX W PT 30 MINUTES: CPT | Mod: 95 | Performed by: COUNSELOR

## 2024-09-26 PROCEDURE — G2211 COMPLEX E/M VISIT ADD ON: HCPCS | Mod: 95 | Performed by: PSYCHIATRY & NEUROLOGY

## 2024-09-26 PROCEDURE — 99214 OFFICE O/P EST MOD 30 MIN: CPT | Mod: 95 | Performed by: PSYCHIATRY & NEUROLOGY

## 2024-09-26 RX ORDER — TRAZODONE HYDROCHLORIDE 50 MG/1
25-50 TABLET, FILM COATED ORAL
Qty: 90 TABLET | Refills: 3 | Status: SHIPPED | OUTPATIENT
Start: 2024-09-26

## 2024-09-26 ASSESSMENT — PAIN SCALES - GENERAL: PAINLEVEL: MODERATE PAIN (5)

## 2024-09-26 NOTE — PROGRESS NOTES
"Virtual Visit Details    Type of service:  Video Visit   Video Start Time: {video visit start/end time for provider to select:613361}  Video End Time:{video visit start/end time for provider to select:964919}    Originating Location (pt. Location): {video visit patient location:940725::\"Home\"}  {PROVIDER LOCATION On-site should be selected for visits conducted from your clinic location or adjoining [facility] hospital, academic office, or other nearby [facility] building. Off-site should be selected for all other provider locations, including home:340760}  Distant Location (provider location):  {virtual location provider:286625}  Platform used for Video Visit: {Virtual Visit Platforms:369618::\"[employer]\"}  "

## 2024-09-26 NOTE — PROGRESS NOTES
"Telemedicine Visit: The patient's condition can be safely assessed and treated via synchronous audio and visual telemedicine encounter.      Reason for Telemedicine Visit: Patient has requested telehealth visit    Originating Site (Patient Location): Patient's home    Distant Location (provider location): Off-Site    Consent:  The patient/guardian has verbally consented to: the potential risks and benefits of telemedicine (video visit) versus in person care; bill my insurance or make self-payment for services provided; and responsibility for payment of non-covered services.     Mode of Communication:  Video Conference via [employer]    As the provider I attest to compliance with applicable laws and regulations related to telemedicine.        Outpatient Psychiatric Progress Note    Name: Jolynn Haji   : 1955                    Primary Care Provider: Helga Ibarra MD   Therapist: Working with DINESH Whipple (therapist may be out early May- maybe out for 3 months)    PHQ-9 scores:      2024     3:14 PM 2024     5:44 PM 2024     2:40 PM   PHQ-9 SCORE   PHQ-9 Total Score MyChart 1 (Minimal depression) 1 (Minimal depression) 1 (Minimal depression)   PHQ-9 Total Score 1 1 1       TIFFANY-7 scores:      2024     3:26 PM 2024     1:56 PM 2024     2:54 PM   TIFFANY-7 SCORE   Total Score 2 (minimal anxiety) 0 (minimal anxiety) 1 (minimal anxiety)   Total Score 2 0 1       Patient Identification:  Patient is a 69 year old,   White Choose not to answer female  who presents for return visit with me.  Patient is currently retired. Patient attended the phone/video session alone today. Patient prefers to be called: \" Azalea\".    Interim History:  I last saw Jolynn Haji for outpatient psychiatry return visit on 2024. During that appointment, we:  Continue trazodone 25-50 mg at bedtime as needed for sleep  Continue Effexor-XR/venlafaxine ER 75 mg daily for anxiety.  Continue " "propranolol EXTENDED RELEASE 60 mg ONCE daily for anxiety, tremor, headaches.  Continue all other cares per primary care provider.     9/26: Patient overall doing fairly well.  Still some anxiety but managing okay and does not feel it is interfering with functioning at this time.  Still some ongoing headaches but does not feel it is related to Effexor.  Off trazodone for 1 week.  Sleeping well.  No acute safety concerns.  No SI.  No problematic drug or alcohol use.  Continues in psychotherapy.    Per [facility], [facility], during today's team-based visit:  MH update:  having some headaches.  Went off trazodone.  Sleeping well without it.  Not as foggy.    Been off about a week[place]  Social Religious events.  Grandkids back in school.  Granddaughter tough time in school/swimming.  Interested in doing more volunteer work  Stresses:  n/a  Appetite: n/a    Sleep: Doing well.  Outpatient Provider updates: Erika MONTIEL  SI/SIB/HI: n/a  FARIDA: n/a  Side effects/compliance:  Interventions:  [facility] engaged in support around anxiety mgmt  Most important:  Doing well    Past Psychiatric Med Trials:  Psych Meds at Intake:  lexapro 20 mg daily  abilify 5 mg daily      Past Psych Meds:  Amitriptyline  Quetiapine  Mirtazapine - stopped \"because I was sleeping through the night;\" hallucinations     Psychiatric ROS:  Jolynn Haji reports mood has been: Stable  Anxiety has been: Manageable  Sleep has been: Sleeping well without trazodone  Seema sxs: None  Psychosis sxs: N/A  ADHD/ADD sxs: N/A  PTSD sxs: N/A  PHQ9 and GAD7 scores were reviewed today if completed.   Medication side effects: headaches?   Current stressors include: Symptoms and See HPI above  Coping mechanisms and supports include: Family, Hobbies and Friends, therapy    Current medications include:   Current Outpatient Medications   Medication Sig Dispense Refill    clindamycin (CLEOCIN T) 1 % external lotion Apply topically 2 times daily 60 mL 3    " lactobacillus rhamnosus, GG, (CULTURELL) capsule Take 1 capsule by mouth daily Taking every other day      propranolol ER (INDERAL LA) 60 MG 24 hr capsule Take 1 capsule (60 mg) by mouth daily 90 capsule 3    traZODone (DESYREL) 50 MG tablet Take 0.5-1 tablets (25-50 mg) by mouth nightly as needed for sleep 90 tablet 3    venlafaxine (EFFEXOR XR) 75 MG 24 hr capsule Take 1 capsule (75 mg) by mouth daily 90 capsule 3     Current Facility-Administered Medications   Medication Dose Route Frequency Provider Last Rate Last Admin    lidocaine 1 % 4 mL  4 mL INTRA-ARTICULAR Once Helga Ibarra MD        lidocaine 1 % injection 3 mL  3 mL   Giovany Quiorz MD   3 mL at 07/01/22 1120    lidocaine 1 % injection 4 mL  4 mL   Giovany Quiroz MD   4 mL at 07/01/22 1120       Past Medical/Surgical History:  Past Medical History:   Diagnosis Date    Abnormal glandular Papanicolaou smear of cervix- ASCUS in 2000 12/5/2003    ASC H- 2000    Basal cell carcinoma of leg, right 05/2016    Dr Rogers    Colon polyps 05/2018    tubular adenoam - due 5 yrs - 2 mm    Concussion without loss of consciousness 4/12/2017    Hidradenitis     left  groin    HSIL (high grade squamous intraepithelial lesion) on Pap smear of cervix 7/5/2000    Normal paps from 2001 to 2016/cc    Hypertension goal BP (blood pressure) < 140/90     OA (osteoarthritis) of knee     moderate    Postmenopausal since 7/2008     Shingles 7/23/2010    left     Squamous cell carcinoma of skin, unspecified     Synovial cyst of popliteal space       has a past medical history of Abnormal glandular Papanicolaou smear of cervix- ASCUS in 2000 (12/5/2003), Basal cell carcinoma of leg, right (05/2016), Colon polyps (05/2018), Concussion without loss of consciousness (4/12/2017), Hidradenitis, HSIL (high grade squamous intraepithelial lesion) on Pap smear of cervix (7/5/2000), Hypertension goal BP (blood pressure) < 140/90, OA (osteoarthritis) of knee, Postmenopausal  (since 7/2008 ), Shingles (7/23/2010), Squamous cell carcinoma of skin, unspecified, and Synovial cyst of popliteal space.    She has no past medical history of Malignant melanoma (H) or Squamous cell carcinoma.    Social History:  Reviewed. No changes to social history except as noted above in HPI.    Vital Signs:   None. This is phone/video visit.     Labs:  Most recent laboratory results reviewed and the pertinent results include:   TSH   Date Value Ref Range Status   02/05/2024 1.26 0.30 - 4.20 uIU/mL Final   04/25/2022 1.30 0.40 - 4.00 mU/L Final   01/13/2021 2.07 0.40 - 4.00 mU/L Final     Lab Results   Component Value Date    WBC 8.1 04/25/2022    WBC 6.4 01/13/2021     Lab Results   Component Value Date    RBC 4.58 04/25/2022    RBC 4.54 01/13/2021     Lab Results   Component Value Date    HGB 13.6 04/25/2022    HGB 13.3 01/13/2021     Lab Results   Component Value Date    HCT 41.1 04/25/2022    HCT 41.7 01/13/2021     No components found for: MCT  Lab Results   Component Value Date    MCV 90 04/25/2022    MCV 92 01/13/2021     Lab Results   Component Value Date    MCH 29.7 04/25/2022    MCH 29.3 01/13/2021     Lab Results   Component Value Date    MCHC 33.1 04/25/2022    MCHC 31.9 01/13/2021     Lab Results   Component Value Date    RDW 12.9 04/25/2022    RDW 13.1 01/13/2021     Lab Results   Component Value Date     04/25/2022     01/13/2021     Last Comprehensive Metabolic Panel:  Sodium   Date Value Ref Range Status   02/05/2024 138 135 - 145 mmol/L Final     Comment:     Reference intervals for this test were updated on 09/26/2023 to more accurately reflect our healthy population. There may be differences in the flagging of prior results with similar values performed with this method. Interpretation of those prior results can be made in the context of the updated reference intervals.    01/13/2021 140 133 - 144 mmol/L Final     Potassium   Date Value Ref Range Status   02/05/2024 4.7 3.4 -  5.3 mmol/L Final   04/25/2022 4.4 3.4 - 5.3 mmol/L Final   01/13/2021 4.1 3.4 - 5.3 mmol/L Final     Chloride   Date Value Ref Range Status   02/05/2024 102 98 - 107 mmol/L Final   04/25/2022 105 94 - 109 mmol/L Final   01/13/2021 108 94 - 109 mmol/L Final     Carbon Dioxide   Date Value Ref Range Status   01/13/2021 28 20 - 32 mmol/L Final     Carbon Dioxide (CO2)   Date Value Ref Range Status   02/05/2024 27 22 - 29 mmol/L Final   04/25/2022 26 20 - 32 mmol/L Final     Anion Gap   Date Value Ref Range Status   02/05/2024 9 7 - 15 mmol/L Final   04/25/2022 6 3 - 14 mmol/L Final   01/13/2021 4 3 - 14 mmol/L Final     Glucose   Date Value Ref Range Status   02/05/2024 92 70 - 99 mg/dL Final   04/25/2022 104 (H) 70 - 99 mg/dL Final   01/13/2021 91 70 - 99 mg/dL Final     Comment:     Fasting specimen     Urea Nitrogen   Date Value Ref Range Status   02/05/2024 17.4 8.0 - 23.0 mg/dL Final   04/25/2022 23 7 - 30 mg/dL Final   01/13/2021 18 7 - 30 mg/dL Final     Creatinine   Date Value Ref Range Status   02/05/2024 0.68 0.51 - 0.95 mg/dL Final   01/13/2021 0.68 0.52 - 1.04 mg/dL Final     GFR Estimate   Date Value Ref Range Status   02/05/2024 >90 >60 mL/min/1.73m2 Final   01/13/2021 >90 >60 mL/min/[1.73_m2] Final     Comment:     Non  GFR Calc  Starting 12/18/2018, serum creatinine based estimated GFR (eGFR) will be   calculated using the Chronic Kidney Disease Epidemiology Collaboration   (CKD-EPI) equation.       Calcium   Date Value Ref Range Status   02/05/2024 9.9 8.8 - 10.2 mg/dL Final   01/13/2021 8.8 8.5 - 10.1 mg/dL Final     Bilirubin Total   Date Value Ref Range Status   02/05/2024 0.7 <=1.2 mg/dL Final   01/13/2021 0.8 0.2 - 1.3 mg/dL Final     Alkaline Phosphatase   Date Value Ref Range Status   02/05/2024 75 40 - 150 U/L Final     Comment:     Reference intervals for this test were updated on 11/14/2023 to more accurately reflect our healthy population. There may be differences in the  flagging of prior results with similar values performed with this method. Interpretation of those prior results can be made in the context of the updated reference intervals.   01/13/2021 72 40 - 150 U/L Final     ALT   Date Value Ref Range Status   02/05/2024 16 0 - 50 U/L Final     Comment:     Reference intervals for this test were updated on 6/12/2023 to more accurately reflect our healthy population. There may be differences in the flagging of prior results with similar values performed with this method. Interpretation of those prior results can be made in the context of the updated reference intervals.     01/13/2021 17 0 - 50 U/L Final     AST   Date Value Ref Range Status   02/05/2024 19 0 - 45 U/L Final     Comment:     Reference intervals for this test were updated on 6/12/2023 to more accurately reflect our healthy population. There may be differences in the flagging of prior results with similar values performed with this method. Interpretation of those prior results can be made in the context of the updated reference intervals.   01/13/2021 13 0 - 45 U/L Final     Review of Systems:  10 systems (general, cardiovascular, respiratory, eyes, ENT, endocrine, GI, , M/S, neurological) were reviewed. Most pertinent finding(s) is/are: Some chronic pains, intermittent mild headaches, see HPI above. The remaining systems are all unremarkable.    Mental Status Examination (limited as this is by phone/video):  Appearance: Awake, alert, appears stated age, no acute distress, well-groomed   Attitude:  cooperative, pleasant   Motor: No gross abnormalities noted today.  Not formally tested  Oriented to:  person, place, time, and situation  Attention Span and Concentration:  normal  Speech: Normal volume, normal rate, normal rhythm, coherent  Language: intact  Mood: Pretty good  Affect: Overall appropriate and mood congruent  Associations:  no loose associations  Thought Process:  logical, linear and goal  oriented  Thought Content:  no evidence of suicidal ideation or homicidal ideation, no evidence of psychotic thought, no auditory hallucinations present and no visual hallucinations present  Recent and Remote Memory: Intact to interview.  Not formally assessed.   Fund of Knowledge: appropriate  Insight: Good  Judgment:  intact, adequate for safety  Impulse Control:  intact    Suicide Risk Assessment:  Today Jolynn Haji reports no suicidal ideation. Based on all available evidence including the factors cited above, Jolynn Haji does not appear to be at imminent risk for self-harm, does not meet criteria for a 72-hr hold, and therefore remains appropriate for ongoing outpatient level of care.  A thorough assessment of risk factors related to suicide and self-harm have been reviewed and are noted above. The patient convincingly denies suicidality on several occasions. Local community safety resources reviewed for patient to use if needed. There was no deceit detected, and the patient presented in a manner that was believable.     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  R/O Underlying Parkinson's Disease (pt has seen neurology - continuing to monitor)    Medical comorbidities include:   Patient Active Problem List    Diagnosis Date Noted    Essential hypertension with goal blood pressure less than 140/90- getting some symptomatic hypotension episodes on 2.5mg lisinopril daily 07/16/2001     Priority: High    Major depressive disorder, recurrent, mild (H24) 12/06/2023     Priority: Medium    Bilateral lower extremity edema- trace pitting edema distal LE to mid pretibial areas Left > right 09/27/2023     Priority: Medium    Tremor 09/27/2023     Priority: Medium    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 - lower pretibial area  07/12/2023     Priority: Medium     Drug-induced parkinsonism (H)- Abilify - generic = ARIPiprazole( 5mg) - RESOLVED OFF ABILIFY  01/02/2023     Priority: Medium    Mood disorder (H24) 01/02/2023     Priority: Medium    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 - much improved - resolved as of 5/5/2023 07/07/2022     Priority: Medium    Adjustment insomnia - resolved as of 5/5/2023 - off of ambien with psychiatry's okay  12/23/2021     Priority: Medium    Screening for cervical cancer      Priority: Medium     1999 NIL  2000 ASCUS-H.  >> Colpo: Neg  2001 NIL   2003 NIL pap, Neg HPV  [id number], 2016  NIL pap  7/25/19 NIL pap, Neg HPV    Criteria necessary to stop screening per ASCCP guidelines:  Older than 65 years  Three consecutive negative cytology results or two consecutive negative cotest results within the previous 10 years, with the most recent being performed within the last 5 years.   (Women with hx of MADDIE 2 or 3, or adenocarcinoma in situ should continue screening for full 20 years, even if this goes past age 65).        Hyperlipidemia LDL goal <130 11/30/2018     Priority: Medium    Basal cell carcinoma      Priority: Medium    Colon polyps 05/01/2018     Priority: Medium    Primary localized osteoarthrosis of left lower leg 02/19/2018     Priority: Medium    Other secondary osteoarthritis of left knee 01/29/2018     Priority: Medium    Chronic constipation 09/14/2017     Priority: Medium    Raynaud's phenomenon without gangrene 09/14/2017     Priority: Medium    Insomnia, unspecified type 07/25/2017     Priority: Medium    TIFFANY (generalized anxiety disorder) 05/23/2017     Priority: Medium    Adjustment disorder with mixed anxiety and depressed mood- mild  at this time 05/11/2017     Priority: Medium    Family history of celiac disease 05/11/2017     Priority: Medium    Basal cell carcinoma of leg, right 05/01/2016     Priority: Medium    Sun-damaged skin 04/21/2016     Priority:  Medium    Seasonal allergic rhinitis 04/21/2016     Priority: Medium    Dupuytren's disease of palm - right 4th digit flexor tendon 06/11/2015     Priority: Medium    History of migraine headaches- assoc. with nausea/vomiting - resolved around menopause - were  premenstrual  01/08/2014     Priority: Medium    Rosacea 05/01/2013     Priority: Medium    Numbness of feet- distal feet R>L  02/21/2011     Priority: Medium    NUMBNESS AND TINGLING OF FOOT - bilateral -mild  08/25/2010     Priority: Medium    Osteopenia 01/02/2009     Priority: Medium    Lipidosis 10/24/2006     Priority: Medium     Problem list name updated by automated process. Provider to review      Localized osteoarthritis of hand 11/01/2005     Priority: Medium     Problem list name updated by automated process. Provider to review      Stress incontinence, female - mild 10/23/2012     Priority: Low    Cough 10/23/2012     Priority: Low    Acute bronchitis- recurrent- ? related to mold in school building- pt has since retired and no further bronchitis 10/23/2012     Priority: Low    Postmenopausal      Priority: Low    DERMATOPHYTOSIS OF NAIL- toenails  03/14/2006     Priority: Low     trichophyton rubrum on culture - 1/06       Synovial cyst of popliteal space      Priority: Low    Hidradenitis 12/05/2003     Priority: Low       Psychosocial & Contextual Factors: see HPI above    Assessment:  From Intake, 5/12/2022:  Jolynn Haji is a 66-year-old female with a history including anxiety, depression, insomnia status post concussion in April 2017.  No major mental health struggles prior to concussion in 2017.  Patient was started on Lexapro, quetiapine, mirtazapine to help with her symptoms.  Patient had been feeling much better on that medication combination and once she was sleeping well again quetiapine and mirtazapine were discontinued.  This past January the patient started to have some worsening symptoms and Abilify was added.  Patient has felt  like her current medications of Lexapro and Abilify have been helpful but reports starting to feel really slowed and numb the past several weeks.  She also reported a weight loss of nearly 20 pounds over the last 6 weeks versus several months (patient was not very sure). I am wondering if she could be experiencing side effects of her increased Lexapro dose.  She is agreeable to decreasing her dose slightly to 15 mg daily.  Could also consider decreasing Abilify in the case it is not her Lexapro.  She also desires to sleep a little bit better than she has been.  Could consider restarting mirtazapine at bedtime for sleep and to also augment her Lexapro.  Could also be beneficial for patient to undergo cognitive screening to check in and see what her baseline is status post concussion.  No acute safety concerns.  No SI.  No problematic drug or alcohol use.    5/24/2022:  Patient with some slight improvements of possible medication related side effects after decreasing Lexapro.  I am wondering if there might be more robust improvements with decreasing Abilify/aripiprazole.  Possible drug-induced Parkinsonism?  She does seem to have delayed/slowed speech, masked facies.  Her gait and other movements unable to be observed but she does report other struggles that may be consistent with such a presentation.  We are going to decrease her Abilify down to 2 mg daily and I will see her back in just a few weeks.  We will likely discontinue her Abilify at her next visit.  I will message her primary care provider to get some collateral regarding previous baseline functioning prior to starting Abilify.  History of TBI could be confounding presentation.  Could consider neurology consult.    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  Instructed to stop Abilify today.  We will monitor for improvement of symptoms.  If symptoms do not improve may need to refer to neurology.  If symptoms start to improve  we will consider low-dose methylphenidate augmentation of Lexapro next visit to help with low energy, mood, focus and concentration, cognition, s/p TBI if still clinically indicated.  We would discuss risks and benefits with patient and her daughter at that time.  No other acute safety concerns or SI.  No problematic drug or alcohol use.  If patient starts to struggle with sleep would restart mirtazapine at bedtime.    7/28/2022:  Pt still with sxs consistent with drug-induced Parksinsonism.  Neurology consult will be placed so that patient might get scheduled sooner than later due to the typically long wait.  We will start Ambien in hopes we can get her sleeping much better.  Discussed risks and benefits of its use, particularly in the geriatric population.  Lexapro has not been as helpful as it helped for her anxiety.  We will taper this medication and start Effexor/venlafaxine in hopes that might be more effective for her symptoms.  No acute safety concerns.  No SI.  No problematic drug or alcohol use.    8/11/2022:  Overall with continued improved symptoms.  Hopefully patient will continue to have improvement of her symptoms off of Abilify.  Patient will be seeing neurology soon.  Encouraged to keep appointment.  We will continue taper off of Lexapro and continue on monotherapy with venlafaxine.  We will also continue Ambien at this time for sleep since patient is sleeping much better on the medication with improved symptoms and tolerating well.  No acute safety concerns.  No problematic drug or alcohol use.    9/1/2022:  Ongoing anxiety, still intense at times.  Still not sleeping very well.  Could consider sleep evaluation, but patient quite overwhelmed right now with various referrals and doctors appointments.  Briseyda had been working quite well but not keeping her sleep now.  Still falling asleep relatively well.  We will transition to Ambien CR to see if that is more helpful.  We will continue to consider  sleep evaluation to rule out possibility of REM behavior sleep disorder.  We will also transition venlafaxine to bedtime to see if that helps with any possible nausea.  Working with neurology to monitor for Parkinson's disease.  I do recommend neuropsychological testing due to ongoing possible cognitive difficulties/struggles.  No acute safety concerns.  No SI.  No problematic drug or alcohol use.    10/3/2022:  Patient overall with some ongoing anxiety but slowly improving since it got worse late August.  Recommend continuing to optimize Effexor-XR, especially since patient tolerating well.  No longer having any nausea now that dose is taken at bedtime.  Patient feeling a little foggy and off balance with Ambien CR and so we will switch back to Ambien immediate release.  No falls.  Recommend increasing Effexor-XR first and once well tolerated switching back to Ambien immediate release.  May need to continue to work on alternative options for sleep if does not do well back on Ambien immediate release.  No acute safety concerns.  No SI.  No problematic drug or alcohol use.  Encouraged to continue working with neurology.  Next appointment in November.    10/24/2022:  Patient overall with some improved anxiety on increased dose of Effexor-XR.  Could use additional improvement of anxiety and so we will continue to optimize therapy with Effexor-XR.  We will increase dose to 150 mg daily.  Tolerating well with no notable negative side effects.  Patient transitioned back to Ambien CR and is sleeping better on this compared to Ambien immediate release.  We will continue Ambien CR for sleep.  Discussed possibility of sleep medicine referral but patient declines at this time.  Could consider in the future.  No acute safety concerns.  No SI.  No problematic drug or alcohol use.  Encouraged to continue working with neurology.    12/09/2022:  Patient overall doing fairly well.  Feels like increased dose of venlafaxine has been  helpful.  I do wonder if she is having some subtle withdrawal symptoms late in the afternoon with headache and fatigue.  Discussed shifting her dose to the morning.  However, she reports she will try to drink more water and see if that is helpful before trying to make changes to her medication.  She feels like things are going well enough that she really does not want to change anything.  Encouraged to continue in therapy.  No acute safety concerns.  No SI.  No problematic drug or alcohol use.    1/19/2023:  Patient overall doing well with ongoing improvements.  Patient even recently starting to drive again which is a big deal.  She continues in therapy and finds it helpful.  Discussed possible changes to her dosing to see if headaches might improve, along with fatigue.  Discussed possibly splitting her Effexor-XR dose as 75 mg twice daily versus 37 point 5 in the morning and 112.5 mg at bedtime.  Patient feels like she is in a good place right now and really does not want to make any medication changes if possible.  We will keep things the same for now.  Encouraged to drink lots of water.  No acute safety concerns.  No SI.  No problematic drug or alcohol use.    3/9/2023:  Since last visit we decreased Effexor-XR just slightly.  Unfortunately her anxiety has worsened on decreased dose.  Headaches and fatigue did not improve at all.  Since anxiety worsened we will go back up to 150 mg daily.  Patient prefers this over a trial of further decreased dose.  We will start a trial with propranolol to see if this helps improve anxiety and headaches.  May also help with her tremor.  Discussed watching for feeling too lightheaded or dizzy.  Patient also on low-dose lisinopril.  Last vital signs blood pressure 132/78 and pulse 96 on 2/24/2023.  No acute safety concerns.  No SI.  No problematic drug or alcohol use.    3/23/2023:  Patient overall doing very well.  Propranolol has been quite helpful.  Still having some anxiety  and headaches so we will continue to optimize therapy with propranolol some.  We will increase to 20 mg twice daily.  Neuropsychiatric testing reviewed.  Some mild deficits but nothing indicating a neurocognitive disorder at this time.  This was reassuring to the patient.  Patient will review results with neurologist at upcoming appointment in May.  No acute safety concerns.  No SI.  No problematic drug or alcohol use.    4/27/2023:  Patient overall with relative stability of mood and anxiety symptoms.  We will work to discontinue Ambien to see if we can manage on medication such as trazodone.  I still continue to worry about possibility of prodromal Parkinson's disease.  Patient reported her sister noted patient to be yelling and screaming in her sleep the other night.  This concerns me for possible REM sleep behavior disorder.  Could simply be related to antidepressant therapy-but given recent parkinsonism response to neuroleptic, symptoms should continue to be monitored closely.  Could again consider sleep medicine referral at some point in the future if needed.  Patient also continues to have headaches.  If patient remains stable would like to trial decreased dose of Effexor-XR to see if this helps improve headaches.  No acute safety concerns.  No SI.  No problematic drug or alcohol use.    5/11/2023:  Patient overall doing well.  Was able to replace Ambien with trazodone and still sleeping well.  Still some headaches but greatly improved off Ambien.  Went back to taking propranolol 20 mg twice daily and finds this most effective for her anxiety, tremor, etc.  Discussed we could consider lowering venlafaxine ER in a couple of months if still doing really well and still having some headaches.  Patient has a neurology appointment tomorrow for check-in.  No acute safety concerns.  No SI.  No problematic drug or alcohol use.    7/11/2023:  Patient overall doing well still.  Mood has remained stable.  Anxiety overall  manageable and stable as well.  Unfortunately continues to have headaches and feel fatigued.  Discussed I would like to move ahead with a trial of decreased venlafaxine.  Patient agreeable and will monitor mental health symptoms.  No acute safety concerns.  No SI.  No problematic drug or alcohol use.    10/10/2023:  Patient overall doing okay.  Unfortunately headaches and fatigue have persisted even with decreased dose of venlafaxine.  We will increase propranolol and changed to extended release so it is once a day to see if that is a little bit more helpful for anxiety and headaches.  Patient controlled decreased dose of venlafaxine and we may consider changing to something different after the holidays.  We would like to prevent any decompensation during the holiday season.  No acute safety concerns.  No SI.  No problematic drug or alcohol use.  Patient continues in therapy.    1/9/2024:  Patient overall doing relatively well.  Still getting headaches from venlafaxine so we will decrease the dose further to 75 mg daily to see if helpful without worsening mental health symptoms.  No acute safety concerns.  No SI.  No problematic drug or alcohol use.    2/28/2024:  Patient doing relatively well.  Still some anxiety but manageable.  Anxiety does not currently interfere with patient's ability to function or go about daily activities.  Easily able to distract self.  No major mood symptoms at this time.  Side effects from venlafaxine have drastically improved on lower dose.  If mental health symptoms/anxiety were to worsen in the future, would strongly consider transition to sertraline due to negative side effects on higher doses of venlafaxine.  No acute safety concerns.  No SI.  No problematic drug or alcohol use.    6/26/2024:  Patient overall doing very well.  Mood has been stable.  Anxiety manageable.  Does use Tylenol twice a day every day for headaches.  Discussed possibility of some rebound headaches.  Resources  sent to patient via [employer].  We will continue to monitor dizziness.  Blood pressures can be a little low at times.  We discussed trazodone can cause dizziness and orthostatic hypotension (blood pressure drops upon standing).  Patient wonders if it is more related to her lisinopril.  Patient encouraged to document blood pressures and present to primary care provider for ongoing evaluation.  No acute safety concerns.  No SI.  No problematic drug or alcohol use.  No medication changes today.    9/26/2024:  Patient overall doing well.  Relatively stable.  Managing anxiety symptoms well.  Sleeping well without nightly trazodone.  Discussed patient could consider massage therapy or acupuncture to see if it improves headaches.  Patient prefers not to change Effexor at this time.  Discussed patient could also talk with primary care provider about possible neurology referral for chronic headaches.  No acute safety concerns.  No SI.  No problematic drug or alcohol use.  No med changes today.    Medication side effects and alternatives were reviewed. Health promotion activities recommended and reviewed today. All questions addressed. Education and counseling completed regarding risks and benefits of medications and psychotherapy options. Recommend therapy for additional support.     Treatment Plan:   Continue trazodone 25-50 mg at bedtime as needed for sleep  Continue Effexor-XR/venlafaxine ER 75 mg daily for anxiety.  Continue propranolol EXTENDED RELEASE 60 mg ONCE daily for anxiety, tremor, headaches.  Continue all other cares per primary care provider.   Continue all other medications as reviewed per electronic medical record today.   Safety plan reviewed. To the Emergency Department as needed or call after hours crisis line at [phone] or [phone]. [place] Crisis Text Line. Text [place] to 994891 or Suicide LifeLine Chat: suicidepreventionlifeline.org/chat  Continue individual psychotherapy for additional support  and ongoing development of nonpharmacologic coping skills and strategies.  Schedule an appointment with me in 3 months or sooner as needed. Call [facility] at [phone] to schedule.  Follow up with primary care provider as planned or for acute medical concerns.  Call the psychiatric nurse line with medication questions or concerns at [phone].  MyChart may be used to communicate with your provider, but this is not intended to be used for emergencies.    Administrative Billing:   Phone Call/Video Duration: 20 Minutes  Start: 10:30a  Stop: 10:50a    The longitudinal plan of care for the diagnosis(es)/condition(s) as documented were addressed during this visit. Due to the added complexity in care, I will continue to support Az[place]a in the subsequent management and with ongoing continuity of care.    Patient Status:  Continuous care patient.     Signed:   Judy Pompa DO  [facility]    Disclaimer: This note consists of symbols derived from keyboarding, dictation and/or voice recognition software. As a result, there may be errors in the script that have gone undetected. Please consider this when interpreting information found in this chart.

## 2024-09-26 NOTE — NURSING NOTE
Current patient location: [id number] [place] [address] [place] [address]    Is the patient currently in the state of [place]? YES    Visit mode:VIDEO    If the visit is dropped, the patient can be reconnected by: VIDEO VISIT: Text to cell phone:   Telephone Information:   Mobile [phone]       Will anyone else be joining the visit? NO  (If patient encounters technical issues they should call [phone]    How would you like to obtain your AVS? MyChart    Are changes needed to the allergy or medication list? No    Are refills needed on medications prescribed by this physician? YES    Rooming Documentation:  Questionnaire(s) completed    Reason for visit: RECHECK    Brooklyn QUEEN

## 2024-09-26 NOTE — PATIENT INSTRUCTIONS
Treatment Plan:   Continue trazodone 25-50 mg at bedtime as needed for sleep  Continue Effexor-XR/venlafaxine ER 75 mg daily for anxiety.  Continue propranolol EXTENDED RELEASE 60 mg ONCE daily for anxiety, tremor, headaches.  Continue all other cares per primary care provider.   Continue all other medications as reviewed per electronic medical record today.   Safety plan reviewed. To the Emergency Department as needed or call after hours crisis line at [phone] or [phone]. [place] Crisis Text Line. Text [place] to 491440 or Suicide LifeLine Chat: suicidepreventionlifeline.org/chat  Continue individual psychotherapy for additional support and ongoing development of nonpharmacologic coping skills and strategies.  Schedule an appointment with me in 3 months or sooner as needed. Call [facility] at [phone] to schedule.  Follow up with primary care provider as planned or for acute medical concerns.  Call the psychiatric nurse line with medication questions or concerns at [phone].  [employer] may be used to communicate with your provider, but this is not intended to be used for emergencies.    Patient Education   Collaborative Care Psychiatry Service  What to Expect  Here's what to expect from your Collaborative Care Psychiatry Service (CCPS).   About CCPS  CCPS means 2 people work together to help you get better. You'll meet with a behavioral health clinician and a psychiatric doctor. A behavioral health clinician helps people with mental health problems by talking with them. A psychiatric doctor helps people by giving them medicine.  How it works  At every visit, you'll see the behavioral health clinician (BHC) first. They'll talk with you about how you're doing and teach you how to feel better.   Then you'll see the psychiatric doctor. This doctor can help you deal with troubling thoughts and feelings by giving you medicine. They'll make sure you know the plan for your care.   CCPS usually  "takes 3 to 6 visits. If you need more visits, we may have you start seeing a different psychiatric doctor for ongoing care.  If you have any questions or concerns, we'll be glad to talk with you.  About visits  Be open  At your visits, please talk openly about your problems. It may feel hard, but it's the best way for us to help you.  Cancelling visits  If you can't come to your visit, please call us right away at [phone]. If you don't cancel at least 24 hours (1 full day) before your visit, that's \"late cancellation.\"  Being late to visits  Being very late is the same as not showing up. You will be a \"no show\" if:  Your appointment starts with a [facility], and you're more than 15 minutes late for a 30-minute (half hour) visit. This will also cancel your appointment with the psychiatric doctor.  Your appointment is with a psychiatric doctor only, and you're more than 15 minutes late for a 30-minute (half hour) visit.  Your appointment is with a psychiatric doctor only, and you're more than 30 minutes late for a 60-minute (full hour) visit.  If you cancel late or don't show up 2 times within 6 months, we may end your care.   Getting help between visits  If you need help between visits, you can call us Monday to Friday from 8 a.m. to 4:30 p.m. at [phone].  Emergency care  Call 911 or go to the nearest emergency department if your life or someone else's life is in danger.  Call 988 anytime to reach the national Suicide and Crisis hotline.  Medicine refills  To refill your medicine, call your pharmacy. You can also call [facility]'s Behavioral Access at [phone], Monday to Friday, 8 a.m. to 4:30 p.m. It can take 1 to 3 business days to get a refill.   Forms, letters, and tests  You may have papers to fill out, like FMLA, short-term disability, and workability. We can help you with these forms at your visits, but you must have an appointment. You may need more than 1 visit for this, to be in an " intensive therapy program, or both.  Before we can give you medicine for ADHD, we may refer you to get tested for it or confirm it another way.  We may not be able to give you an emotional support animal letter.  We don't do mental health checks ordered by the court.   We don't do mental health testing, but we can refer you to get tested.   Thank you for choosing us for your care.  For informational purposes only. Not to replace the advice of your health care provider. Copyright   2022 [facility]. All rights reserved. [email or web address] [id number] - 12/22.

## 2024-10-07 NOTE — PROGRESS NOTES
[facility] Integrated Behavioral Health  October 9, 2024      Behavioral Health Clinician Progress Note    Patient Name: Jolynn Haji           Service Type:  Individual      Service Location:   MyChart / Email (patient reached)     Session Start Time: 9:21 am   Session End Time: 9:59 am      Session Length: 38 - 52      Attendees: Patient     Service Modality:  Video Visit:      Provider verified identity through the following two step process.  Patient provided:  Patient is known previously to provider    Telemedicine Visit: The patient's condition can be safely assessed and treated via synchronous audio and visual telemedicine encounter.      Reason for Telemedicine Visit: Patient has requested telehealth visit    Originating Site (Patient Location): Patient's home    Distant Site (Provider Location): [facility]    Consent:  The patient/guardian has verbally consented to: the potential risks and benefits of telemedicine (video visit) versus in person care; bill my insurance or make self-payment for services provided; and responsibility for payment of non-covered services.     Patient would like the video invitation sent by:  My Chart    Mode of Communication:  Video Conference via [employer]    As the provider I attest to compliance with applicable laws and regulations related to telemedicine.    Visit Activities (Refresh list every visit): [facility] Only    Diagnostic Assessment Date: 2/28/24 (updated)  Treatment Plan Review Date: 12/11/24  See Flowsheets for today's PHQ-9 and TIFFANY-7 results  Previous PHQ-9:       8/13/2024     3:14 PM 9/24/2024     5:44 PM 9/25/2024     2:40 PM   PHQ-9 SCORE   PHQ-9 Total Score MyChart 1 (Minimal depression) 1 (Minimal depression) 1 (Minimal depression)   PHQ-9 Total Score 1 1 1     Previous TIFFANY-7:       4/23/2024     3:26 PM 4/25/2024     1:56 PM 8/12/2024     2:54 PM   TIFFANY-7 SCORE   Total Score 2 (minimal anxiety) 0  (minimal anxiety) 1 (minimal anxiety)   Total Score 2 0 1       KARINA LEVEL:      8/27/2010     3:00 PM 2/22/2011    11:00 AM   KARINA Score (Last Two)   KARINA Raw Score 51 49   Activation Score 91.6 82.8   KARINA Level 4 4       DATA  Extended Session (60+ minutes): No  Interactive Complexity: No  Crisis: No  [facility] Patient: No    Treatment Objective(s) Addressed in This Session:  Target Behavior(s):  anxiety and depression    Depressed Mood: Increase interest, engagement, and pleasure in doing things  Decrease frequency and intensity of feeling down, depressed, hopeless  Improve quantity and quality of night time sleep / decrease daytime naps  Feel less tired and more energy during the day   Improve diet, appetite, mindful eating, and / or meal planning  Identify negative self-talk and behaviors: challenge core beliefs, myths, and actions  Improve concentration, focus, and mindfulness in daily activities   Feel less fidgety, restless or slow in daily activities / interpersonal interactions  Anxiety: will experience a reduction in anxiety, will develop more effective coping skills to manage anxiety symptoms, will develop healthy cognitive patterns and beliefs and will increase ability to function adaptively    Current Stressors / Issues:  Reports following cutting back and stopping trazodone, having increase anxiety. She restarted taking half a pill at night again. Feels more tired in the AM with taking the med.Had been experiencing some feelings of being a bit more down. Has been having a steady increase in anxiety for about 3 weeks or so. She has had increased external factors causing more anxiety. Discussed making decision on going to [employer]. Talked through allowing permission to not go. Pt attending to emotions around of not going. Discussed tendencies with inflexibility. Talked through dealing with set backs with attempting to be more flexible. Discussed exposure to experiences that cause discomfort.     "    9/25/24  Reports doing ok. Reports anxiety while at [employer] gathering today. Reports having a good time however having anxiety and desire to go home. When she has the physical sx it causes some panic. Uses lemon drops to help with anxiety in the moment. Discussed physical sx of anxiety leading to severe headaches. Discussed thinking in the moment. Sleeping well with a week of being off trazodone. Not feeling \"thick headed in the afternoon.\" Feeling good about progress. Discussed staying in the present moment when anxiety is higher.          Progress on Treatment Objective(s) / Homework:  Satisfactory progress - ACTION (Actively working towards change); Intervened by reinforcing change plan / affirming steps taken    Motivational Interviewing    MI Intervention: Expressed Empathy/Understanding, Supported Autonomy, Collaboration, Evocation, Open-ended questions, Change talk (evoked) and Reframe     Change Talk Expressed by the Patient: Desire to change Ability to change Reasons to change Need to change    Provider Response to Change Talk: E - Evoked more info from patient about behavior change, A - Affirmed patient's thoughts, decisions, or attempts at behavior change, R - Reflected patient's change talk and S - Summarized patient's change talk statements      Care Plan review completed: Yes    Medication Review:  No changes to current psychiatric medication(s)    Medication Compliance:  Yes    Changes in Health Issues:   None reported    Chemical Use Review:   Substance Use: Chemical use reviewed, no active concerns identified      Tobacco Use: No current tobacco use.      Assessment: Current Emotional / Mental Status (status of significant symptoms):  Risk status (Self / Other harm or suicidal ideation)  Patient denies a history of suicidal ideation, suicide attempts, self-injurious behavior, homicidal ideation, homicidal behavior and and other safety concerns  Patient denies current fears or concerns for " personal safety.  Patient denies current or recent suicidal ideation or behaviors.  Patient denies current or recent homicidal ideation or behaviors.  Patient denies current or recent self injurious behavior or ideation.  Patient denies other safety concerns.  A safety and risk management plan has not been developed at this time, however patient was encouraged to call [place] [phone] should there be a change in any of these risk factors.    Appearance:   Appropriate   Eye Contact:   Good   Psychomotor Behavior: Normal   Attitude:   Cooperative   Orientation:   All  Speech   Rate / Production: Normal    Volume:  Normal   Mood:    Anxious   Affect:    Appropriate   Thought Content:  Clear   Thought Form:  Coherent  Logical   Insight:    Good     Diagnoses:  1. Generalized anxiety disorder    2. Mood disorder (H)    3. Insomnia, unspecified type        Collateral Reports Completed:  Not Applicable    Plan: (Homework, other):  Patient was given information about behavioral services and encouraged to schedule a follow up appointment with the clinic [facility] in 1 week.  She was also given information about mental health symptoms and treatment options .  CD Recommendations: No indications of CD issues.   Erika Francisco [facility]      ______________________________________________________________________    Integrated Primary Care Behavioral Health Treatment Plan    Patient's Name: Jolynn Haji  YOB: 1955    Date of Creation: 10/5/22  Date Treatment Plan Last Reviewed/Revised: 5/28/24    DSM5 Diagnoses: 300.02 (F41.1) Generalized Anxiety Disorder  Psychosocial / Contextual Factors: Individual Factors high anxiety associated with depressive sx, interfereing with ability to function as she would like.  .  PROMIS (reviewed every 90 days):     Referral / Collaboration:  Was/were discussed and patient will pursue. - individual therapy    Anticipated number of session for this episode of care: 5-8 sessions  Anticipation  frequency of session: Weekly  Anticipated Duration of each session: 38-52 minutes  Treatment plan will be reviewed in 90 days or when goals have been changed.       MeasurableTreatment Goal(s) related to diagnosis / functional impairment(s)  Goal 1: Patient will work with provider to manage symptoms    I will know I've met my goal when when less anxious and feeling down.      Objective #A (Patient Action)    Patient will   attend all sessions .  Status: Continued - Date(s): 9/11/24    Intervention(s)  Therapist will teach  educate patient on treatment options, clarify concerns, work with pt to overcome any resistance to compliance. .    Objective #B  Patient will identify 3 fears / thoughts that contribute to feeling anxious.  Status: Continued - Date(s): 9/11/24    Intervention(s)  Therapist will  educate patient on treatment options, clarify concerns, work with pt to overcome any resistance to compliance. .    Objective #C  Patient will identify 3 initial signs or symptoms of anxiety.  Status: Continued - Date(s): 9/11/24    Intervention(s)  Therapist will  educate patient on treatment options, clarify concerns, work with pt to overcome any resistance to compliance. .        Patient has reviewed and agreed to the above plan.      DINESH Whipple  October 9, 2024      Answers submitted by the patient for this visit:  Patient Health Questionnaire (Submitted on 3/6/2024)  If you checked off any problems, how difficult have these problems made it for you to do your work, take care of things at home, or get along with other people?: Not difficult at all  PHQ9 TOTAL SCORE: 1    Answers submitted by the patient for this visit:  Patient Health Questionnaire (Submitted on 4/18/2024)  If you checked off any problems, how difficult have these problems made it for you to do your work, take care of things at home, or get along with other people?: Not difficult at all  PHQ9 TOTAL SCORE: 0    Answers submitted by the patient  for this visit:  Patient Health Questionnaire (Submitted on 5/27/2024)  PHQ9 TOTAL SCORE: 0    Answers submitted by the patient for this visit:  Patient Health Questionnaire (Submitted on 9/24/2024)  PHQ9 TOTAL SCORE: 1

## 2024-10-08 ASSESSMENT — PATIENT HEALTH QUESTIONNAIRE - PHQ9
SUM OF ALL RESPONSES TO PHQ QUESTIONS 1-9: 1
SUM OF ALL RESPONSES TO PHQ QUESTIONS 1-9: 1

## 2024-10-09 ENCOUNTER — VIRTUAL VISIT (OUTPATIENT)
Dept: BEHAVIORAL HEALTH | Facility: CLINIC | Age: 69
End: 2024-10-09
Payer: COMMERCIAL

## 2024-10-09 DIAGNOSIS — F39 MOOD DISORDER (H): ICD-10-CM

## 2024-10-09 DIAGNOSIS — F41.1 GENERALIZED ANXIETY DISORDER: Primary | ICD-10-CM

## 2024-10-09 DIAGNOSIS — G47.00 INSOMNIA, UNSPECIFIED TYPE: ICD-10-CM

## 2024-10-09 PROCEDURE — 90834 PSYTX W PT 45 MINUTES: CPT | Mod: 95 | Performed by: SOCIAL WORKER

## 2024-10-12 ASSESSMENT — ANXIETY QUESTIONNAIRES
7. FEELING AFRAID AS IF SOMETHING AWFUL MIGHT HAPPEN: NOT AT ALL
5. BEING SO RESTLESS THAT IT IS HARD TO SIT STILL: NOT AT ALL
7. FEELING AFRAID AS IF SOMETHING AWFUL MIGHT HAPPEN: NOT AT ALL
1. FEELING NERVOUS, ANXIOUS, OR ON EDGE: SEVERAL DAYS
6. BECOMING EASILY ANNOYED OR IRRITABLE: NOT AT ALL
2. NOT BEING ABLE TO STOP OR CONTROL WORRYING: NOT AT ALL
4. TROUBLE RELAXING: NOT AT ALL
GAD7 TOTAL SCORE: 1
GAD7 TOTAL SCORE: 1
3. WORRYING TOO MUCH ABOUT DIFFERENT THINGS: NOT AT ALL

## 2024-10-14 ENCOUNTER — OFFICE VISIT (OUTPATIENT)
Dept: FAMILY MEDICINE | Facility: CLINIC | Age: 69
End: 2024-10-14
Payer: COMMERCIAL

## 2024-10-14 VITALS
BODY MASS INDEX: 22.1 KG/M2 | HEART RATE: 81 BPM | SYSTOLIC BLOOD PRESSURE: 110 MMHG | OXYGEN SATURATION: 99 % | DIASTOLIC BLOOD PRESSURE: 78 MMHG | TEMPERATURE: 97.9 F | HEIGHT: 68 IN | RESPIRATION RATE: 18 BRPM | WEIGHT: 145.8 LBS

## 2024-10-14 DIAGNOSIS — I95.1 ORTHOSTATIC HYPOTENSION: ICD-10-CM

## 2024-10-14 DIAGNOSIS — F51.02 ADJUSTMENT INSOMNIA: ICD-10-CM

## 2024-10-14 DIAGNOSIS — F43.23 ADJUSTMENT DISORDER WITH MIXED ANXIETY AND DEPRESSED MOOD: ICD-10-CM

## 2024-10-14 DIAGNOSIS — I10 ESSENTIAL HYPERTENSION WITH GOAL BLOOD PRESSURE LESS THAN 140/90: ICD-10-CM

## 2024-10-14 DIAGNOSIS — R51.9 CHRONIC DAILY HEADACHE: Primary | ICD-10-CM

## 2024-10-14 PROCEDURE — G2211 COMPLEX E/M VISIT ADD ON: HCPCS | Performed by: FAMILY MEDICINE

## 2024-10-14 PROCEDURE — 99214 OFFICE O/P EST MOD 30 MIN: CPT | Performed by: FAMILY MEDICINE

## 2024-10-14 ASSESSMENT — PATIENT HEALTH QUESTIONNAIRE - PHQ9
SUM OF ALL RESPONSES TO PHQ QUESTIONS 1-9: 1
SUM OF ALL RESPONSES TO PHQ QUESTIONS 1-9: 1

## 2024-10-14 ASSESSMENT — ANXIETY QUESTIONNAIRES
6. BECOMING EASILY ANNOYED OR IRRITABLE: NOT AT ALL
GAD7 TOTAL SCORE: 3
8. IF YOU CHECKED OFF ANY PROBLEMS, HOW DIFFICULT HAVE THESE MADE IT FOR YOU TO DO YOUR WORK, TAKE CARE OF THINGS AT HOME, OR GET ALONG WITH OTHER PEOPLE?: NOT DIFFICULT AT ALL
2. NOT BEING ABLE TO STOP OR CONTROL WORRYING: NOT AT ALL
4. TROUBLE RELAXING: NOT AT ALL
GAD7 TOTAL SCORE: 3
1. FEELING NERVOUS, ANXIOUS, OR ON EDGE: NEARLY EVERY DAY
5. BEING SO RESTLESS THAT IT IS HARD TO SIT STILL: NOT AT ALL
IF YOU CHECKED OFF ANY PROBLEMS ON THIS QUESTIONNAIRE, HOW DIFFICULT HAVE THESE PROBLEMS MADE IT FOR YOU TO DO YOUR WORK, TAKE CARE OF THINGS AT HOME, OR GET ALONG WITH OTHER PEOPLE: NOT DIFFICULT AT ALL
7. FEELING AFRAID AS IF SOMETHING AWFUL MIGHT HAPPEN: NOT AT ALL
3. WORRYING TOO MUCH ABOUT DIFFERENT THINGS: NOT AT ALL
7. FEELING AFRAID AS IF SOMETHING AWFUL MIGHT HAPPEN: NOT AT ALL
GAD7 TOTAL SCORE: 3

## 2024-10-14 NOTE — PATIENT INSTRUCTIONS
" [facility]  [address] [place] [address]  Office: [phone]   Fax:    [phone]      Boost - supplemental Oxygen  - comes in Spearmint or Peppermint - go for the 10L bottles to see if those help your daily headaches. Can get from [employer].     Ask Dr. Pompa if you can taper down or off  your Effexor XR (venlafaxine) secondary to your daily headaches.     Discussed need for resistance training to help keep bones strong and decrease age -related muscle loss, decreasing insulin resistance and increasing basal metabolic rate to help burn more calories at rest and with exertion.     Look at YouTube for free exercises for basic weight training for biceps, triceps, deltoids, low back, hips, hamstrings, quadriceps, and calf muscles to help strengthen your bones as well as the below exercises.     Thank you so much or choosing [facility]  for your Health Care. It was a pleasure seeing you at your visit today! Please contact us with any questions or concerns you may have.                   Helga Ibarra MD                              To reach your [facility] care team after hours call:   [phone] press #2 \"to speak with your care team\".  This will get you to our clinic instead of routing to central [facility]  scheduling.     PLEASE NOTE OUR HOURS HAVE CHANGED secondary to COVID-19 coronavirus pandemic, as we are trying to minimize patient exposure to the virus,  which is now widespread in the [facility].  These hours may change with very little notice.  We apologize for any inconvenience.       Our current clinic hours are:          Monday- Thursday   7:00am - 6:00pm  in person.      Friday  7:00am- 5:00pm                       Saturday and Sunday : Closed to in person and virtual visits        We have telephone and virtual visit times available between    7:00am - 6pm on Monday-Friday as well.                "                                 Phone:  [phone]      Our pharmacy hours: Monday through Friday 8:00am to 5:00pm                        Saturday - 9:00 am to 12 noon       Sunday : Closed.              Phone:  [phone]              ###  Please note: at this time we are not accepting any walk-in visits. ###      There is also information available at our web site:  [email or web address]    If your provider ordered any lab tests and you do not receive the results within 10 business days, please call the clinic.    If you need a medication refill please contact your pharmacy.  Please allow 3 business days for your refill to be completed.    Our clinic offers telephone visits and e visits.  Please ask one of your team members to explain more.      Use [employer] (secure email communication and access to your chart) to send your primary care provider a message or make an appointment. Ask someone on your Team how to sign up for [employer].

## 2024-10-14 NOTE — PROGRESS NOTES
Assessment & Plan :       ICD-10-CM    1. Chronic daily headache- ? due to venlafaxine vs. stress/anxiety vs. other - seemed to start when pt started venlafaxine 1.5 yrs ago  R51.9       2. Adjustment insomnia - resolved as of 5/5/2023 - off of ambien with psychiatry's okay   F51.02       3. Adjustment disorder with mixed anxiety and depressed mood- mild  at this time- gets some social and other anxiety still  F43.23       4. Orthostatic hypotension- resolved off of lisinopril 2.5mg daily and doing daily 2.25 mile walks with her dog  I95.1       5. Essential hypertension with goal blood pressure less than 140/90 - then Orthostatic hypotension- resolved off of lisinopril 2.5mg daily and doing daily 2.25 mile walks with her dog  I10            Future Appointments 10/14/2024 - 4/12/2025        Date Visit Type Length Department Provider     10/23/2024  2:00 PM [facility] RETURN 30 min [facility] Kiefer, Leah, LICSW              11/6/2024  2:00 PM [facility] RETURN 30 min [facility]              12/12/2024 11:20 AM OFFICE VISIT 40 min  FAMILY PRACTICE Helga Ibarra MD    Location Instructions:     [facility] is located at [address] [place] [address], along [address] Free parking is available; access the lot by turning north from [address] onto [address], then west onto [facility].              1/2/2025 11:00 AM [facility] RETURN 30 min [facility] White, Nicole, LPCC    Location Instructions:     [facility] has 2 buildings on its campus. Please visit us at [address] [place], [place] and enter the building with the numbers [phone] on it.               1/2/2025 11:30 AM [facility] 30 min [facility] Judy Pompa DO    Location Instructions:     [facility] has 2 buildings on its campus. Please visit us [facility] [address] [place] and enter the building with the  numbers [phone] on it.               2/6/2025 10:40 AM ANNUAL WELLNESS 40 min  FAMILY PRACTICE Helga Ibarra MD    Location Instructions:     [facility] is located at [address], along [address]. Free parking is available; access the lot by turning north from [address] onto [facility] [address], then west onto [facility].              2/24/2025 10:15 AM RETURN DERMATOLOGY 15 min [facility] Sheldon Rogers MD    Location Instructions:     The medical center is located at [address]. (between [address] in [place], [place] miles north of [place]).                    MEDICATIONS:  Continue current medications without change- consider tapering off the Effexor to see if that is causing your headaches - only do this if Dr. Pompa agrees.    Regular exercise  See Patient Instructions.     Boost - supplemental Oxygen  - comes in Spearmint or Peppermint - go for the 10L bottles to see if those help your daily headaches. Can get from [employer].     Ask Dr. Pompa if you can taper down or off  your Effexor XR (venlafaxine) secondary to your daily headaches.     Discussed need for resistance training to help keep bones strong and decrease age -related muscle loss, decreasing insulin resistance and increasing basal metabolic rate to help burn more calories at rest and with exertion.     Return in about 8 weeks (around 12/12/2024) for anxiety, depression, w/ Dr. SUE for 40 min appt, then 2/6/2025 for annual wellness visit.   Future Appointments 10/14/2024 - 4/12/2025        Date Visit Type Length Department Provider     10/23/2024  2:00 PM [facility] RETURN 30 min [facility] Kiefer, Leah, LICSW              11/6/2024  2:00 PM [facility] RETURN 30 min [facility] Kiefer, Leah, LICSW              12/12/2024 11:20 AM OFFICE VISIT 40 min  FAMILY Helga Briceno MD    Location Instructions:     [facility]  [place] is located at [address], [address] Free parking is available; access the lot by turning north from [address]  onto [facility] [address] [facility], then west onto [facility].              1/2/2025 11:00 AM [facility] Mica George LPCC    Location Instructions:     [facility] has 2 buildings on its campus. Please visit us at [address] [place], [place] and enter the building with the numbers [phone] on it.               1/2/2025 11:30 AM [facility] 30 min [facility] Judy Pompa DO    Location Instructions:     [facility] has 2 buildings on its campus. Please visit us at [address] [place], [place] and enter the building with the numbers [phone] on it.               2/6/2025 10:40 AM ANNUAL WELLNESS 40 min  FAMILY PRACTICE Helga Ibarra MD    Location Instructions:     [facility] is located at [address], along [address]. Free parking is available; access the lot by turning north from [address]  onto [facility], then west onto [facility].              2/24/2025 10:15 AM RETURN DERMATOLOGY 15 min [place] [facility] Sheldon Rogers MD    Location Instructions:     The medical center is located at [address]. (between [address] in [place], four miles north of [place]).                                  Helga Ibarra MD        Subjective :   Azalea is a 69 year old, very well known to me, presenting for the following health issues:  Mental Health Problem (Recheck mood)  and the following other medical problems:      1. Chronic daily headache- ? due to venlafaxine vs. stress/anxiety vs. other - seemed to start when pt started venlafaxine 1.5 yrs ago    2. Adjustment insomnia - resolved as of 5/5/2023 - off of ambien with psychiatry's okay     3. Adjustment disorder with mixed anxiety and depressed  "mood- mild  at this time- gets some social and other anxiety still    4. Orthostatic hypotension- resolved off of lisinopril 2.5mg daily and doing daily 2.25 mile walks with her dog    5. Essential hypertension with goal blood pressure less than 140/90 - then Orthostatic hypotension- resolved off of lisinopril 2.5mg daily and doing daily 2.25 mile walks with her dog            10/14/2024    11:15 AM   Additional Questions   Roomed by karolina alejo   Accompanied by self     History of Present Illness       Mental Health Follow-up:  Patient presents to follow-up on Anxiety.    Patient's anxiety since last visit has been:  Medium  The patient is having other symptoms associated with anxiety.  Any significant life events: No  Patient is feeling anxious or having panic attacks.  Patient has no concerns about alcohol or drug use.    Headaches:   Since the patient's last clinic visit, headaches are: no change  The patient is getting headaches:  Most days  She is able to do normal daily activities when she has a migraine.  The patient is taking the following rescue/relief medications:  Tylenol   Patient states \"I get some relief\" from the rescue/relief medications.   The patient is taking the following medications to prevent migraines:  No medications to prevent migraines  In the past 4 weeks, the patient has gone to an Urgent Care or Emergency Room 0 times times due to headaches.    She eats 2-3 servings of fruits and vegetables daily.She consumes 0 sweetened beverage(s) daily.She exercises with enough effort to increase her heart rate 20 to 29 minutes per day.  She exercises with enough effort to increase her heart rate 5 days per week.   She is taking medications regularly.     Still having daily headaches on the venlafaxine - seemingly triggered by anxiety. She's carrying lemon drops in her pockets and /or purse for her anxiety and the lemon drops  help her headaches as well.  HA's haven't worsened at all. Better with 2 " tylenol , but not fully resolved. There are sometimes during the day that she doesn't have a headache. Has a daily cup of coffee and sometimes a Fresca in the afternoon.       Tapered off her trazodone for sleep. Now falling asleep and staying asleep well for about 8 hours.  Awakening feeling rested in the am's.      Anxiety : saw Dr. Pompa, her psychiatrist via video visit on 9/26/2024. Continuing to see Erika Francisco [facility], for therapy every other week.   How are you doing with your anxiety since your last visit? No change  Are you having other symptoms that might be associated with anxiety? Yes:  some  headaches, but not throwing up  Have you had a significant life event? No   Are you feeling depressed? No  Do you have any concerns with your use of alcohol or other drugs? No  [place].   If she has a lot going on, she feels increased anxiety , especially if she's been running around doing things for others.   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     Blood Pressure - stopped her lisinopril 2.5mg secondary to lower BP's with some orthostatic hypotension lightheaded feelings.  BP's have been great in the 110's/70's since then.  Prior to stopping was 96/56. She's been off lisinopril since 7/2024.  She's walking her dog , Casper, daily for 2 miles.       Social History     Tobacco Use    Smoking status: Never    Smokeless tobacco: Never   Vaping Use    Vaping status: Never Used   Substance Use Topics    Alcohol use: Yes     Alcohol/week: 0.0 - 1.0 standard drinks of alcohol     Comment: 2 per month    Drug use: No     Comment: no herbal meds either         8/12/2024     2:54 PM 10/12/2024     5:12 PM 10/14/2024    11:11 AM   TIFFANY-7 SCORE   Total Score 1 (minimal anxiety) 1 (minimal anxiety) 3 (minimal anxiety)   Total Score 1 1 3         9/25/2024     2:40 PM 10/8/2024     4:07 PM 10/14/2024    11:10 AM   PHQ   PHQ-9 Total Score 1 1 1   Q9: Thoughts of better off  dead/self-harm past 2 weeks Not at all Not at all Not at all         10/14/2024    11:10 AM   Last PHQ-9   1.  Little interest or pleasure in doing things 0   2.  Feeling down, depressed, or hopeless 0   3.  Trouble falling or staying asleep, or sleeping too much 0   4.  Feeling tired or having little energy 1   5.  Poor appetite or overeating 0   6.  Feeling bad about yourself 0   7.  Trouble concentrating 0   8.  Moving slowly or restless 0   Q9: Thoughts of better off dead/self-harm past 2 weeks 0   PHQ-9 Total Score 1         10/14/2024    11:11 AM   TIFFANY-7    1. Feeling nervous, anxious, or on edge 3   2. Not being able to stop or control worrying 0   3. Worrying too much about different things 0   4. Trouble relaxing 0   5. Being so restless that it is hard to sit still 0   6. Becoming easily annoyed or irritable 0   7. Feeling afraid, as if something awful might happen 0   TIFFANY-7 Total Score 3   If you checked any problems, how difficult have they made it for you to do your work, take care of things at home, or get along with other people? Not difficult at all     How many servings of fruits and vegetables do you eat daily?  2-3  On average, how many sweetened beverages do you drink each day (Examples: soda, juice, sweet tea, etc.  Do NOT count diet or artificially sweetened beverages)?   0  How many days per week do you exercise enough to make your heart beat faster? 3 or less  How many minutes a day do you exercise enough to make your heart beat faster? 30 - 60  How many days per week do you miss taking your medication? 3  What makes it hard for you to take your medications?   Nothing  I take my meds daily.     Review of Systems:   Constitutional, HEENT, cardiovascular, pulmonary, GI, , musculoskeletal, neuro, skin, endocrine and psych systems are negative, except as otherwise noted.      Objective    /78 (BP Location: Right arm, Patient Position: Chair, Cuff Size: Adult Regular)   Pulse 81   Temp  "97.9  F (36.6  C) (Tympanic)   Resp 18   Ht 1.727 m (5' 8\")   Wt 66.1 kg (145 lb 12.8 oz)   LMP 06/28/2001   SpO2 99%   BMI 22.17 kg/m    Body mass index is 22.17 kg/m .  Physical Exam :   GENERAL: alert and no distress  EYES: Eyes grossly normal to inspection, PERRL and conjunctivae and sclerae normal  HENT: ear canals and TM's normal, nose and mouth without ulcers or lesions  NECK: no adenopathy, no asymmetry, masses, or scars  RESP: lungs clear to auscultation - no rales, rhonchi or wheezes  CV: regular rate and rhythm, normal S1 S2, no S3 or S4, no murmur, click or rub, no peripheral edema  ABDOMEN: soft, nontender, no hepatosplenomegaly, no masses and bowel sounds normal  MS: no gross musculoskeletal defects noted, no edema  SKIN: no suspicious lesions or rashes  NEURO: Normal strength and tone, mentation intact and speech normal  PSYCH: mentation appears normal, affect normal/bright. Alert and oriented. No acute distress. Appears well-groomed and casually dressed. Affect is normal, not particularly depressed. In good humor and laughs appropriately. Mildly anxious. No evidence of psychosis.     Office Visit on 02/05/2024   Component Date Value Ref Range Status    Creatinine Urine mg/dL 02/05/2024 52.4  mg/dL Final    The reference ranges have not been established in urine creatinine. The results should be integrated into the clinical context for interpretation.    Albumin Urine mg/L 02/05/2024 <12.0  mg/L Final    The reference ranges have not been established in urine albumin. The results should be integrated into the clinical context for interpretation.    Albumin Urine mg/g Cr 02/05/2024    Final    Unable to calculate, urine albumin and/or urine creatinine is outside detectable limits.  Microalbuminuria is defined as an albumin:creatinine ratio of 17 to 299 for males and 25 to 299 for females. A ratio of albumin:creatinine of 300 or higher is indicative of overt proteinuria.  Due to biologic " variability, positive results should be confirmed by a second, first-morning random or 24-hour timed urine specimen. If there is discrepancy, a third specimen is recommended. When 2 out of 3 results are in the microalbuminuria range, this is evidence for incipient nephropathy and warrants increased efforts at glucose control, blood pressure control, and institution of therapy with an angiotensin-converting-enzyme (ACE) inhibitor (if the patient can tolerate it).      TSH 02/05/2024 1.26  0.30 - 4.20 uIU/mL Final    Cholesterol 02/05/2024 203 (H)  <200 mg/dL Final    Triglycerides 02/05/2024 67  <150 mg/dL Final    Direct Measure HDL 02/05/2024 74  >=50 mg/dL Final    LDL Cholesterol Calculated 02/05/2024 116 (H)  <=100 mg/dL Final    Non HDL Cholesterol 02/05/2024 129  <130 mg/dL Final    Patient Fasting > 8hrs? 02/05/2024 Yes   Final    Sodium 02/05/2024 138  135 - 145 mmol/L Final    Reference intervals for this test were updated on 09/26/2023 to more accurately reflect our healthy population. There may be differences in the flagging of prior results with similar values performed with this method. Interpretation of those prior results can be made in the context of the updated reference intervals.     Potassium 02/05/2024 4.7  3.4 - 5.3 mmol/L Final    Carbon Dioxide (CO2) 02/05/2024 27  22 - 29 mmol/L Final    Anion Gap 02/05/2024 9  7 - 15 mmol/L Final    Urea Nitrogen 02/05/2024 17.4  8.0 - 23.0 mg/dL Final    Creatinine 02/05/2024 0.68  0.51 - 0.95 mg/dL Final    GFR Estimate 02/05/2024 >90  >60 mL/min/1.73m2 Final    Calcium 02/05/2024 9.9  8.8 - 10.2 mg/dL Final    Chloride 02/05/2024 102  98 - 107 mmol/L Final    Glucose 02/05/2024 92  70 - 99 mg/dL Final    Alkaline Phosphatase 02/05/2024 75  40 - 150 U/L Final    Reference intervals for this test were updated on 11/14/2023 to more accurately reflect our healthy population. There may be differences in the flagging of prior results with similar values  performed with this method. Interpretation of those prior results can be made in the context of the updated reference intervals.    AST 02/05/2024 19  0 - 45 U/L Final    Reference intervals for this test were updated on 6/12/2023 to more accurately reflect our healthy population. There may be differences in the flagging of prior results with similar values performed with this method. Interpretation of those prior results can be made in the context of the updated reference intervals.    ALT 02/05/2024 16  0 - 50 U/L Final    Reference intervals for this test were updated on 6/12/2023 to more accurately reflect our healthy population. There may be differences in the flagging of prior results with similar values performed with this method. Interpretation of those prior results can be made in the context of the updated reference intervals.      Protein Total 02/05/2024 7.2  6.4 - 8.3 g/dL Final    Albumin 02/05/2024 4.4  3.5 - 5.2 g/dL Final    Bilirubin Total 02/05/2024 0.7  <=1.2 mg/dL Final    WBC Count 02/05/2024 8.7  4.0 - 11.0 10e3/uL Final    RBC Count 02/05/2024 4.44  3.80 - 5.20 10e6/uL Final    Hemoglobin 02/05/2024 13.1  11.7 - 15.7 g/dL Final    Hematocrit 02/05/2024 40.8  35.0 - 47.0 % Final    MCV 02/05/2024 92  78 - 100 fL Final    MCH 02/05/2024 29.5  26.5 - 33.0 pg Final    MCHC 02/05/2024 32.1  31.5 - 36.5 g/dL Final    RDW 02/05/2024 13.1  10.0 - 15.0 % Final    Platelet Count 02/05/2024 233  150 - 450 10e3/uL Final    25 OH Vitamin D2 02/05/2024 <5  ug/L Final    25 OH Vitamin D3 02/05/2024 32  ug/L Final    25 OH Vit D Total 02/05/2024 <37  20 - 75 ug/L Final    Season, race, dietary intake, and treatment affect the concentration of 25-hydroxy-Vitamin D. Values may decrease during winter months and increase during summer months. Values 20-29 ug/L may indicate Vitamin D insufficiency and values <20 ug/L may indicate Vitamin D deficiency.           Signed Electronically by:      Helga DOMINGUEZ  MD [place]

## 2024-10-15 ASSESSMENT — ANXIETY QUESTIONNAIRES: GAD7 TOTAL SCORE: 3

## 2024-10-21 NOTE — PROGRESS NOTES
[facility] Integrated Behavioral Health  October 23, 2024      Behavioral Health Clinician Progress Note    Patient Name: Jolynn Haji           Service Type:  Individual      Service Location:   MyChart / Email (patient reached)     Session Start Time: 2:00 pm   Session End Time: 2:36 pm      Session Length: 16 - 37      Attendees: Patient     Service Modality:  Video Visit:      Provider verified identity through the following two step process.  Patient provided:  Patient is known previously to provider    Telemedicine Visit: The patient's condition can be safely assessed and treated via synchronous audio and visual telemedicine encounter.      Reason for Telemedicine Visit: Patient has requested telehealth visit    Originating Site (Patient Location): Patient's home    Distant Site (Provider Location): [facility]    Consent:  The patient/guardian has verbally consented to: the potential risks and benefits of telemedicine (video visit) versus in person care; bill my insurance or make self-payment for services provided; and responsibility for payment of non-covered services.     Patient would like the video invitation sent by:  My Chart    Mode of Communication:  Video Conference via Amwell    As the provider I attest to compliance with applicable laws and regulations related to telemedicine.    Visit Activities (Refresh list every visit): [facility] Only    Diagnostic Assessment Date: 2/28/24 (updated)  Treatment Plan Review Date: 12/11/24  See Flowsheets for today's PHQ-9 and TIFFANY-7 results  Previous PHQ-9:       9/25/2024     2:40 PM 10/8/2024     4:07 PM 10/14/2024    11:10 AM   PHQ-9 SCORE   PHQ-9 Total Score MyChart 1 (Minimal depression) 1 (Minimal depression) 1 (Minimal depression)   PHQ-9 Total Score 1 1 1     Previous TIFFANY-7:       8/12/2024     2:54 PM 10/12/2024     5:12 PM 10/14/2024    11:11 AM   TIFFANY-7 SCORE   Total Score 1 (minimal anxiety) 1  (minimal anxiety) 3 (minimal anxiety)   Total Score 1 1 3       KARINA LEVEL:      8/27/2010     3:00 PM 2/22/2011    11:00 AM   KARINA Score (Last Two)   KARINA Raw Score 51 49   Activation Score 91.6 82.8   KARINA Level 4 4       DATA  Extended Session (60+ minutes): No  Interactive Complexity: No  Crisis: No  [facility] Patient: No    Treatment Objective(s) Addressed in This Session:  Target Behavior(s):  anxiety and depression    Depressed Mood: Increase interest, engagement, and pleasure in doing things  Decrease frequency and intensity of feeling down, depressed, hopeless  Improve quantity and quality of night time sleep / decrease daytime naps  Feel less tired and more energy during the day   Improve diet, appetite, mindful eating, and / or meal planning  Identify negative self-talk and behaviors: challenge core beliefs, myths, and actions  Improve concentration, focus, and mindfulness in daily activities   Feel less fidgety, restless or slow in daily activities / interpersonal interactions  Anxiety: will experience a reduction in anxiety, will develop more effective coping skills to manage anxiety symptoms, will develop healthy cognitive patterns and beliefs and will increase ability to function adaptively    Current Stressors / Issues:  Has been busy getting everything picked up from the summer. Reports doing pretty good overall. Having increase in headache. She is at a point where she wants to make a med. Reports she is going to call psychiatrist to make appt for a change in meds. Taking small does of trazodone at night with minimal impacts on anxiety. Sleeping good at night. She is having worry about future holiday events.        10/9/24  Reports following cutting back and stopping trazodone, having increase anxiety. She restarted taking half a pill at night again. Feels more tired in the AM with taking the med.Had been experiencing some feelings of being a bit more down. Has been having a steady increase in anxiety for  "about 3 weeks or so. She has had increased external factors causing more anxiety. Discussed making decision on going to Buddhist group. Talked through allowing permission to not go. Pt attending to emotions around of not going. Discussed tendencies with inflexibility. Talked through dealing with set backs with attempting to be more flexible. Discussed exposure to experiences that cause discomfort.        9/25/24  Reports doing ok. Reports anxiety while at Buddhist gathering today. Reports having a good time however having anxiety and desire to go home. When she has the physical sx it causes some panic. Uses lemon drops to help with anxiety in the moment. Discussed physical sx of anxiety leading to severe headaches. Discussed thinking in the moment. Sleeping well with a week of being off trazodone. Not feeling \"thick headed in the afternoon.\" Feeling good about progress. Discussed staying in the present moment when anxiety is higher.          Progress on Treatment Objective(s) / Homework:  Satisfactory progress - ACTION (Actively working towards change); Intervened by reinforcing change plan / affirming steps taken    Motivational Interviewing    MI Intervention: Expressed Empathy/Understanding, Supported Autonomy, Collaboration, Evocation, Open-ended questions, Change talk (evoked) and Reframe     Change Talk Expressed by the Patient: Desire to change Ability to change Reasons to change Need to change    Provider Response to Change Talk: E - Evoked more info from patient about behavior change, A - Affirmed patient's thoughts, decisions, or attempts at behavior change, R - Reflected patient's change talk and S - Summarized patient's change talk statements      Care Plan review completed: Yes    Medication Review:  No changes to current psychiatric medication(s)    Medication Compliance:  Yes    Changes in Health Issues:   None reported    Chemical Use Review:   Substance Use: Chemical use reviewed, no active concerns " identified      Tobacco Use: No current tobacco use.      Assessment: Current Emotional / Mental Status (status of significant symptoms):  Risk status (Self / Other harm or suicidal ideation)  Patient denies a history of suicidal ideation, suicide attempts, self-injurious behavior, homicidal ideation, homicidal behavior and and other safety concerns  Patient denies current fears or concerns for personal safety.  Patient denies current or recent suicidal ideation or behaviors.  Patient denies current or recent homicidal ideation or behaviors.  Patient denies current or recent self injurious behavior or ideation.  Patient denies other safety concerns.  A safety and risk management plan has not been developed at this time, however patient was encouraged to call [place] [phone] should there be a change in any of these risk factors.    Appearance:   Appropriate   Eye Contact:   Good   Psychomotor Behavior: Normal   Attitude:   Cooperative   Orientation:   All  Speech   Rate / Production: Normal    Volume:  Normal   Mood:    Anxious   Affect:    Appropriate   Thought Content:  Clear   Thought Form:  Coherent  Logical   Insight:    Good     Diagnoses:  1. Generalized anxiety disorder    2. Insomnia, unspecified type        Collateral Reports Completed:  Not Applicable    Plan: (Homework, other):  Patient was given information about behavioral services and encouraged to schedule a follow up appointment with the clinic [facility] [place] in 1 week.  She was also given information about mental health symptoms and treatment options .  CD Recommendations: No indications of CD issues.   Erika Francisco [facility] LICSW      ______________________________________________________________________    Integrated Primary Care Behavioral Health Treatment Plan    Patient's Name: Jolynn Haji  YOB: 1955    Date of Creation: 10/5/22  Date Treatment Plan Last Reviewed/Revised: 5/28/24    DSM5 Diagnoses: 300.02 (F41.1) Generalized Anxiety  Disorder  Psychosocial / Contextual Factors: Individual Factors high anxiety associated with depressive sx, interfereing with ability to function as she would like.  .  PROMIS (reviewed every 90 days):     Referral / Collaboration:  Was/were discussed and patient will pursue. - individual therapy    Anticipated number of session for this episode of care: 5-8 sessions  Anticipation frequency of session: Weekly  Anticipated Duration of each session: 38-52 minutes  Treatment plan will be reviewed in 90 days or when goals have been changed.       MeasurableTreatment Goal(s) related to diagnosis / functional impairment(s)  Goal 1: Patient will work with provider to manage symptoms    I will know I've met my goal when when less anxious and feeling down.      Objective #A (Patient Action)    Patient will   attend all sessions .  Status: Continued - Date(s): 9/11/24    Intervention(s)  Therapist will teach  educate patient on treatment options, clarify concerns, work with pt to overcome any resistance to compliance. .    Objective #B  Patient will identify 3 fears / thoughts that contribute to feeling anxious.  Status: Continued - Date(s): 9/11/24    Intervention(s)  Therapist will  educate patient on treatment options, clarify concerns, work with pt to overcome any resistance to compliance. .    Objective #C  Patient will identify 3 initial signs or symptoms of anxiety.  Status: Continued - Date(s): 9/11/24    Intervention(s)  Therapist will  educate patient on treatment options, clarify concerns, work with pt to overcome any resistance to compliance. .        Patient has reviewed and agreed to the above plan.      DINESH Whipple  October 23, 2024      Answers submitted by the patient for this visit:  Patient Health Questionnaire (Submitted on 3/6/2024)  If you checked off any problems, how difficult have these problems made it for you to do your work, take care of things at home, or get along with other people?: Not  difficult at all  PHQ9 TOTAL SCORE: 1    Answers submitted by the patient for this visit:  Patient Health Questionnaire (Submitted on 4/18/2024)  If you checked off any problems, how difficult have these problems made it for you to do your work, take care of things at home, or get along with other people?: Not difficult at all  PHQ9 TOTAL SCORE: 0    Answers submitted by the patient for this visit:  Patient Health Questionnaire (Submitted on 5/27/2024)  PHQ9 TOTAL SCORE: 0    Answers submitted by the patient for this visit:  Patient Health Questionnaire (Submitted on 9/24/2024)  PHQ9 TOTAL SCORE: 1

## 2024-10-23 ENCOUNTER — VIRTUAL VISIT (OUTPATIENT)
Dept: BEHAVIORAL HEALTH | Facility: CLINIC | Age: 69
End: 2024-10-23
Payer: COMMERCIAL

## 2024-10-23 DIAGNOSIS — G47.00 INSOMNIA, UNSPECIFIED TYPE: ICD-10-CM

## 2024-10-23 DIAGNOSIS — F41.1 GENERALIZED ANXIETY DISORDER: Primary | ICD-10-CM

## 2024-10-23 PROCEDURE — 90832 PSYTX W PT 30 MINUTES: CPT | Mod: 95 | Performed by: SOCIAL WORKER

## 2024-11-04 NOTE — PROGRESS NOTES
[facility] Integrated Behavioral Health  November 6, 2024      Behavioral Health Clinician Progress Note    Patient Name: Jolynn Haji           Service Type:  Individual      Service Location:   MyChart / Email (patient reached)     Session Start Time: 2:00 pm   Session End Time: 2:33 pm      Session Length: 16 - 37      Attendees: Patient     Service Modality:  Video Visit:      Provider verified identity through the following two step process.  Patient provided:  Patient is known previously to provider    Telemedicine Visit: The patient's condition can be safely assessed and treated via synchronous audio and visual telemedicine encounter.      Reason for Telemedicine Visit: Patient has requested telehealth visit    Originating Site (Patient Location): Patient's home    Distant Site (Provider Location): [facility]    Consent:  The patient/guardian has verbally consented to: the potential risks and benefits of telemedicine (video visit) versus in person care; bill my insurance or make self-payment for services provided; and responsibility for payment of non-covered services.     Patient would like the video invitation sent by:  My Chart    Mode of Communication:  Video Conference via Amwell    As the provider I attest to compliance with applicable laws and regulations related to telemedicine.    Visit Activities (Refresh list every visit): [facility] Only    Diagnostic Assessment Date: 2/28/24 (updated)  Treatment Plan Review Date: 12/11/24  See Flowsheets for today's PHQ-9 and TIFFANY-7 results  Previous PHQ-9:       9/25/2024     2:40 PM 10/8/2024     4:07 PM 10/14/2024    11:10 AM   PHQ-9 SCORE   PHQ-9 Total Score MyChart 1 (Minimal depression) 1 (Minimal depression) 1 (Minimal depression)   PHQ-9 Total Score 1 1 1     Previous TIFFANY-7:       8/12/2024     2:54 PM 10/12/2024     5:12 PM 10/14/2024    11:11 AM   TIFFANY-7 SCORE   Total Score 1 (minimal anxiety) 1  (minimal anxiety) 3 (minimal anxiety)   Total Score 1 1 3       KARINA LEVEL:      8/27/2010     3:00 PM 2/22/2011    11:00 AM   KARINA Score (Last Two)   KARINA Raw Score 51 49   Activation Score 91.6 82.8   KARINA Level 4 4       DATA  Extended Session (60+ minutes): No  Interactive Complexity: No  Crisis: No  [facility] Patient: No    Treatment Objective(s) Addressed in This Session:  Target Behavior(s):  anxiety and depression    Depressed Mood: Increase interest, engagement, and pleasure in doing things  Decrease frequency and intensity of feeling down, depressed, hopeless  Improve quantity and quality of night time sleep / decrease daytime naps  Feel less tired and more energy during the day   Improve diet, appetite, mindful eating, and / or meal planning  Identify negative self-talk and behaviors: challenge core beliefs, myths, and actions  Improve concentration, focus, and mindfulness in daily activities   Feel less fidgety, restless or slow in daily activities / interpersonal interactions  Anxiety: will experience a reduction in anxiety, will develop more effective coping skills to manage anxiety symptoms, will develop healthy cognitive patterns and beliefs and will increase ability to function adaptively    Current Stressors / Issues:  Discussed continued good sleep with small dose of trazodone. Continues to have significant headaches. Has not spoke with Dr. Pompa yet but has intentions to. Discussed PCPs recommendations for Acupuncture. Discussed some sx of anxiety with voice trembling and discussing ways of working through this. Reporting anxiety continues to be present but feeling it is manageable. Finds most difficult times are when she has more time on her hand and less busy. Continued work on making life more manageable with not over commitment to things.         10/23/24  Has been busy getting everything picked up from the summer. Reports doing pretty good overall. Having increase in headache. She is at a point where  "she wants to make a med. Reports she is going to call psychiatrist to make appt for a change in meds. Taking small does of trazodone at night with minimal impacts on anxiety. Sleeping good at night. She is having worry about future holiday events.        10/9/24  Reports following cutting back and stopping trazodone, having increase anxiety. She restarted taking half a pill at night again. Feels more tired in the AM with taking the med.Had been experiencing some feelings of being a bit more down. Has been having a steady increase in anxiety for about 3 weeks or so. She has had increased external factors causing more anxiety. Discussed making decision on going to Spiritism group. Talked through allowing permission to not go. Pt attending to emotions around of not going. Discussed tendencies with inflexibility. Talked through dealing with set backs with attempting to be more flexible. Discussed exposure to experiences that cause discomfort.        9/25/24  Reports doing ok. Reports anxiety while at [employer] gathering today. Reports having a good time however having anxiety and desire to go home. When she has the physical sx it causes some panic. Uses lemon drops to help with anxiety in the moment. Discussed physical sx of anxiety leading to severe headaches. Discussed thinking in the moment. Sleeping well with a week of being off trazodone. Not feeling \"thick headed in the afternoon.\" Feeling good about progress. Discussed staying in the present moment when anxiety is higher.          Progress on Treatment Objective(s) / Homework:  Satisfactory progress - ACTION (Actively working towards change); Intervened by reinforcing change plan / affirming steps taken    Motivational Interviewing    MI Intervention: Expressed Empathy/Understanding, Supported Autonomy, Collaboration, Evocation, Open-ended questions, Change talk (evoked) and Reframe     Change Talk Expressed by the Patient: Desire to change Ability to change Reasons to " change Need to change    Provider Response to Change Talk: E - Evoked more info from patient about behavior change, A - Affirmed patient's thoughts, decisions, or attempts at behavior change, R - Reflected patient's change talk and S - Summarized patient's change talk statements      Care Plan review completed: Yes    Medication Review:  No changes to current psychiatric medication(s)    Medication Compliance:  Yes    Changes in Health Issues:   None reported    Chemical Use Review:   Substance Use: Chemical use reviewed, no active concerns identified      Tobacco Use: No current tobacco use.      Assessment: Current Emotional / Mental Status (status of significant symptoms):  Risk status (Self / Other harm or suicidal ideation)  Patient denies a history of suicidal ideation, suicide attempts, self-injurious behavior, homicidal ideation, homicidal behavior and and other safety concerns  Patient denies current fears or concerns for personal safety.  Patient denies current or recent suicidal ideation or behaviors.  Patient denies current or recent homicidal ideation or behaviors.  Patient denies current or recent self injurious behavior or ideation.  Patient denies other safety concerns.  A safety and risk management plan has not been developed at this time, however patient was encouraged to call [place] [phone] should there be a change in any of these risk factors.    Appearance:   Appropriate   Eye Contact:   Good   Psychomotor Behavior: Normal   Attitude:   Cooperative   Orientation:   All  Speech   Rate / Production: Normal    Volume:  Normal   Mood:    Anxious   Affect:    Appropriate   Thought Content:  Clear   Thought Form:  Coherent  Logical   Insight:    Good     Diagnoses:  1. Generalized anxiety disorder    2. Insomnia, unspecified type        Collateral Reports Completed:  Not Applicable    Plan: (Homework, other):  Patient was given information about behavioral services and encouraged to schedule a  follow up appointment with the clinic [facility] in 1 week.  She was also given information about mental health symptoms and treatment options .  CD Recommendations: No indications of CD issues.   Erika Francisco MSW LICSW      ______________________________________________________________________    Integrated Primary Care Behavioral Health Treatment Plan    Patient's Name: Jolynn Haji  YOB: 1955    Date of Creation: 10/5/22  Date Treatment Plan Last Reviewed/Revised: 5/28/24    DSM5 Diagnoses: 300.02 (F41.1) Generalized Anxiety Disorder  Psychosocial / Contextual Factors: Individual Factors high anxiety associated with depressive sx, interfereing with ability to function as she would like.  .  PROMIS (reviewed every 90 days):     Referral / Collaboration:  Was/were discussed and patient will pursue. - individual therapy    Anticipated number of session for this episode of care: 5-8 sessions  Anticipation frequency of session: Weekly  Anticipated Duration of each session: 38-52 minutes  Treatment plan will be reviewed in 90 days or when goals have been changed.       MeasurableTreatment Goal(s) related to diagnosis / functional impairment(s)  Goal 1: Patient will work with provider to manage symptoms    I will know I've met my goal when when less anxious and feeling down.      Objective #A (Patient Action)    Patient will   attend all sessions .  Status: Continued - Date(s): 9/11/24    Intervention(s)  Therapist will teach  educate patient on treatment options, clarify concerns, work with pt to overcome any resistance to compliance. .    Objective #B  Patient will identify 3 fears / thoughts that contribute to feeling anxious.  Status: Continued - Date(s): 9/11/24    Intervention(s)  Therapist will  educate patient on treatment options, clarify concerns, work with pt to overcome any resistance to compliance. .    Objective #C  Patient will identify 3 initial signs or symptoms of anxiety.  Status: Continued -  Date(s): 9/11/24    Intervention(s)  Therapist will  educate patient on treatment options, clarify concerns, work with pt to overcome any resistance to compliance. .        Patient has reviewed and agreed to the above plan.      DINESH Whipple  November 6, 2024      Answers submitted by the patient for this visit:  Patient Health Questionnaire (Submitted on 3/6/2024)  If you checked off any problems, how difficult have these problems made it for you to do your work, take care of things at home, or get along with other people?: Not difficult at all  PHQ9 TOTAL SCORE: 1    Answers submitted by the patient for this visit:  Patient Health Questionnaire (Submitted on 4/18/2024)  If you checked off any problems, how difficult have these problems made it for you to do your work, take care of things at home, or get along with other people?: Not difficult at all  PHQ9 TOTAL SCORE: 0    Answers submitted by the patient for this visit:  Patient Health Questionnaire (Submitted on 5/27/2024)  PHQ9 TOTAL SCORE: 0    Answers submitted by the patient for this visit:  Patient Health Questionnaire (Submitted on 9/24/2024)  PHQ9 TOTAL SCORE: 1

## 2024-11-06 ENCOUNTER — VIRTUAL VISIT (OUTPATIENT)
Dept: BEHAVIORAL HEALTH | Facility: CLINIC | Age: 69
End: 2024-11-06
Payer: COMMERCIAL

## 2024-11-06 DIAGNOSIS — G47.00 INSOMNIA, UNSPECIFIED TYPE: ICD-10-CM

## 2024-11-06 DIAGNOSIS — F41.1 GENERALIZED ANXIETY DISORDER: Primary | ICD-10-CM

## 2024-11-06 PROCEDURE — 90832 PSYTX W PT 30 MINUTES: CPT | Mod: 95 | Performed by: SOCIAL WORKER

## 2024-11-18 NOTE — PROGRESS NOTES
[facility] Integrated Behavioral Health  November 19, 2024      Behavioral Health Clinician Progress Note    Patient Name: Jolynn Haji           Service Type:  Individual      Service Location:   MyChart / Email (patient reached)     Session Start Time: 9:26 am   Session End Time: 10:00 am      Session Length: 16 - 37      Attendees: Patient     Service Modality:  Video Visit:      Provider verified identity through the following two step process.  Patient provided:  Patient is known previously to provider    Telemedicine Visit: The patient's condition can be safely assessed and treated via synchronous audio and visual telemedicine encounter.      Reason for Telemedicine Visit: Patient has requested telehealth visit    Originating Site (Patient Location): Patient's home    Distant Site (Provider Location): [facility]    Consent:  The patient/guardian has verbally consented to: the potential risks and benefits of telemedicine (video visit) versus in person care; bill my insurance or make self-payment for services provided; and responsibility for payment of non-covered services.     Patient would like the video invitation sent by:  My Chart    Mode of Communication:  Video Conference via [facility]    As the provider I attest to compliance with applicable laws and regulations related to telemedicine.    Visit Activities (Refresh list every visit): [facility] Only    Diagnostic Assessment Date: 2/28/24 (updated)  Treatment Plan Review Date: 12/11/24  See Flowsheets for today's PHQ-9 and TIFFANY-7 results  Previous PHQ-9:       10/8/2024     4:07 PM 10/14/2024    11:10 AM 11/18/2024     9:16 AM   PHQ-9 SCORE   PHQ-9 Total Score MyChart 1 (Minimal depression) 1 (Minimal depression) 1 (Minimal depression)   PHQ-9 Total Score 1 1 1        Patient-reported     Previous TIFFANY-7:       8/12/2024     2:54 PM 10/12/2024     5:12 PM 10/14/2024    11:11 AM   TIFFANY-7 SCORE   Total  Score 1 (minimal anxiety) 1 (minimal anxiety) 3 (minimal anxiety)   Total Score 1 1 3       KARINA LEVEL:      8/27/2010     3:00 PM 2/22/2011    11:00 AM   KARINA Score (Last Two)   KARINA Raw Score 51 49   Activation Score 91.6 82.8   KARINA Level 4 4       DATA  Extended Session (60+ minutes): No  Interactive Complexity: No  Crisis: No  [facility] Patient: No    Treatment Objective(s) Addressed in This Session:  Target Behavior(s):  anxiety and depression    Depressed Mood: Increase interest, engagement, and pleasure in doing things  Decrease frequency and intensity of feeling down, depressed, hopeless  Improve quantity and quality of night time sleep / decrease daytime naps  Feel less tired and more energy during the day   Improve diet, appetite, mindful eating, and / or meal planning  Identify negative self-talk and behaviors: challenge core beliefs, myths, and actions  Improve concentration, focus, and mindfulness in daily activities   Feel less fidgety, restless or slow in daily activities / interpersonal interactions  Anxiety: will experience a reduction in anxiety, will develop more effective coping skills to manage anxiety symptoms, will develop healthy cognitive patterns and beliefs and will increase ability to function adaptively    Current Stressors / Issues:  Planning Thanksgiving with family. Dtr will be doing the main work of cooking and will not be having it at her home. This elevates a lot of stress and anxiety for her. Continues to have headaches but have been unpredictable. Has not reached out to Dr. Pompa because felt the headaches were improving but then will have a day of significant headaches. Discussed conversation with PCP about GeneSight testing for medications. Recommended talking with Dr. Pompa about this as well. Discussed dealing unpredictability of anxiety.     11/6/24  Discussed continued good sleep with small dose of trazodone. Continues to have significant headaches. Has not spoke with Dr. Pompa yet  but has intentions to. Discussed PCPs recommendations for Acupuncture. Discussed some sx of anxiety with voice trembling and discussing ways of working through this. Reporting anxiety continues to be present but feeling it is manageable. Finds most difficult times are when she has more time on her hand and less busy. Continued work on making life more manageable with not over commitment to things.         Progress on Treatment Objective(s) / Homework:  Satisfactory progress - ACTION (Actively working towards change); Intervened by reinforcing change plan / affirming steps taken    Motivational Interviewing    MI Intervention: Expressed Empathy/Understanding, Supported Autonomy, Collaboration, Evocation, Open-ended questions, Change talk (evoked) and Reframe     Change Talk Expressed by the Patient: Desire to change Ability to change Reasons to change Need to change    Provider Response to Change Talk: E - Evoked more info from patient about behavior change, A - Affirmed patient's thoughts, decisions, or attempts at behavior change, R - Reflected patient's change talk and S - Summarized patient's change talk statements      Care Plan review completed: Yes    Medication Review:  No changes to current psychiatric medication(s)    Medication Compliance:  Yes    Changes in Health Issues:   None reported    Chemical Use Review:   Substance Use: Chemical use reviewed, no active concerns identified      Tobacco Use: No current tobacco use.      Assessment: Current Emotional / Mental Status (status of significant symptoms):  Risk status (Self / Other harm or suicidal ideation)  Patient denies a history of suicidal ideation, suicide attempts, self-injurious behavior, homicidal ideation, homicidal behavior and and other safety concerns  Patient denies current fears or concerns for personal safety.  Patient denies current or recent suicidal ideation or behaviors.  Patient denies current or recent homicidal ideation or  behaviors.  Patient denies current or recent self injurious behavior or ideation.  Patient denies other safety concerns.  A safety and risk management plan has not been developed at this time, however patient was encouraged to call [place] [phone] should there be a change in any of these risk factors.    Appearance:   Appropriate   Eye Contact:   Good   Psychomotor Behavior: Normal   Attitude:   Cooperative   Orientation:   All  Speech   Rate / Production: Normal    Volume:  Normal   Mood:    Anxious   Affect:    Appropriate   Thought Content:  Clear   Thought Form:  Coherent  Logical   Insight:    Good     Diagnoses:  1. Generalized anxiety disorder    2. Insomnia, unspecified type        Collateral Reports Completed:  Not Applicable    Plan: (Homework, other):  Patient was given information about behavioral services and encouraged to schedule a follow up appointment with the clinic [facility] in 1 week.  She was also given information about mental health symptoms and treatment options .  CD Recommendations: No indications of CD issues.   Erika Francisco MSW LICSW      ______________________________________________________________________    Integrated Primary Care Behavioral Health Treatment Plan    Patient's Name: Jolynn Haji  YOB: 1955    Date of Creation: 10/5/22  Date Treatment Plan Last Reviewed/Revised: 5/28/24    DSM5 Diagnoses: 300.02 (F41.1) Generalized Anxiety Disorder  Psychosocial / Contextual Factors: Individual Factors high anxiety associated with depressive sx, interfereing with ability to function as she would like.  .  PROMIS (reviewed every 90 days):     Referral / Collaboration:  Was/were discussed and patient will pursue. - individual therapy    Anticipated number of session for this episode of care: 5-8 sessions  Anticipation frequency of session: Weekly  Anticipated Duration of each session: 38-52 minutes  Treatment plan will be reviewed in 90 days or when goals have been changed.        MeasurableTreatment Goal(s) related to diagnosis / functional impairment(s)  Goal 1: Patient will work with provider to manage symptoms    I will know I've met my goal when when less anxious and feeling down.      Objective #A (Patient Action)    Patient will   attend all sessions .  Status: Continued - Date(s): 9/11/24    Intervention(s)  Therapist will teach  educate patient on treatment options, clarify concerns, work with pt to overcome any resistance to compliance. .    Objective #B  Patient will identify 3 fears / thoughts that contribute to feeling anxious.  Status: Continued - Date(s): 9/11/24    Intervention(s)  Therapist will  educate patient on treatment options, clarify concerns, work with pt to overcome any resistance to compliance. .    Objective #C  Patient will identify 3 initial signs or symptoms of anxiety.  Status: Continued - Date(s): 9/11/24    Intervention(s)  Therapist will  educate patient on treatment options, clarify concerns, work with pt to overcome any resistance to compliance. .        Patient has reviewed and agreed to the above plan.      JOESPH Whipple  November 19, 2024      Answers submitted by the patient for this visit:  Patient Health Questionnaire (Submitted on 3/6/2024)  If you checked off any problems, how difficult have these problems made it for you to do your work, take care of things at home, or get along with other people?: Not difficult at all  PHQ9 TOTAL SCORE: 1    Answers submitted by the patient for this visit:  Patient Health Questionnaire (Submitted on 4/18/2024)  If you checked off any problems, how difficult have these problems made it for you to do your work, take care of things at home, or get along with other people?: Not difficult at all  PHQ9 TOTAL SCORE: 0    Answers submitted by the patient for this visit:  Patient Health Questionnaire (Submitted on 5/27/2024)  PHQ9 TOTAL SCORE: 0    Answers submitted by the patient for this visit:  Patient  Health Questionnaire (Submitted on 9/24/2024)  PHQ9 TOTAL SCORE: 1      Answers submitted by the patient for this visit:  Patient Health Questionnaire (Submitted on 11/18/2024)  If you checked off any problems, how difficult have these problems made it for you to do your work, take care of things at home, or get along with other people?: Not difficult at all  PHQ9 TOTAL SCORE: 1

## 2024-11-19 ENCOUNTER — VIRTUAL VISIT (OUTPATIENT)
Dept: BEHAVIORAL HEALTH | Facility: CLINIC | Age: 69
End: 2024-11-19
Payer: COMMERCIAL

## 2024-11-19 DIAGNOSIS — F41.1 GENERALIZED ANXIETY DISORDER: Primary | ICD-10-CM

## 2024-11-19 DIAGNOSIS — G47.00 INSOMNIA, UNSPECIFIED TYPE: ICD-10-CM

## 2024-11-19 PROCEDURE — 90832 PSYTX W PT 30 MINUTES: CPT | Mod: 95 | Performed by: SOCIAL WORKER

## 2024-11-19 NOTE — PROGRESS NOTES
[facility] [place]    Behavioral Health Clinician Progress Note   Mental Health & Addiction Services      Wednesday November 20, 2024    Patient Name: Jolynn Haji       Service Type:  Individual   Service Location:  [facility] / Email (patient reached)   Visit Start Time: 1030am  Visit End Time:  10:56 AM   Session Length: 16 - 37    Attendees: Client   Service Modality: Video Visit:      Provider verified identity through the following two step process.  Patient provided:  Patient is known previously to provider    Telemedicine Visit: The patient's condition can be safely assessed and treated via synchronous audio and visual telemedicine encounter.      Reason for Telemedicine Visit: Services only offered telehealth    Originating Site (Patient Location): Patient's home    Distant Site (Provider Location): Provider Remote Setting- Home Office    Consent:  The patient/guardian has verbally consented to: the potential risks and benefits of telemedicine (video visit) versus in person care; bill my insurance or make self-payment for services provided; and responsibility for payment of non-covered services.     Patient would like the video invitation sent by:  My Chart    Mode of Communication:  Video Conference via [facility]    Distant Location (Provider):  Off-site    As the provider I attest to compliance with applicable laws and regulations related to telemedicine.   Visit number: 7    [facility] Visit Activities (Refresh list every visit): [facility] [employer]     [place] Diagnostic Assessment: 2/28/24 Mica George MA [facility]  Treatment Plan Review Date: 11/20/24      DATA:    Extended Session (60+ minutes): No   Interactive Complexity: No   Crisis: No   [facility] Patient: No     Assessments completed prior to visit:   PHQ9:       8/11/2024    11:54 AM 8/13/2024     3:14 PM 9/24/2024     5:44 PM 9/25/2024     2:40 PM 10/8/2024     4:07 PM 10/14/2024    11:10 AM 11/18/2024     9:16 AM   PHQ-9  "SCORE   PHQ-9 Total Score [place] 1 (Minimal depression) 1 (Minimal depression) 1 (Minimal depression) 1 (Minimal depression) 1 (Minimal depression) 1 (Minimal depression) 1 (Minimal depression)   PHQ-9 Total Score 1 1 1 1 1 1 1        Patient-reported     GAD7:       2/4/2024     5:23 PM 2/25/2024     1:22 PM 4/23/2024     3:26 PM 4/25/2024     1:56 PM 8/12/2024     2:54 PM 10/12/2024     5:12 PM 10/14/2024    11:11 AM   TIFFANY-7 SCORE   Total Score 2 (minimal anxiety) 3 (minimal anxiety) 2 (minimal anxiety) 0 (minimal anxiety) 1 (minimal anxiety) 1 (minimal anxiety) 3 (minimal anxiety)   Total Score 2 3 2 0 1 1 3     \"     Reason for Visit/Presenting Concern:  Anxiety    Current Stressors / Issues:  MH update:  Was changing Trazodone dose around was dosing half and 1/4 for the last 3 weeks and things feel off.  Doesn't feel like mood is as good but does feel more clear.  On going headache questions with effexor.   Stresses:  daughter is hosting thanksgiving.  Appetite: n/a  Sleep: n/a  Outpatient Provider updates: Erkia MONTIEL  SI/SIB/HI: n/a  Side effects/compliance:  Interventions:  [facility] encouraged a headache log to better track information  Most important:  Wonders about genesight testing, wonders if this would be appropriate.  Trazdone dosing 1/4 or 1/2.  Wonders about effexor and headaches.     9/26   update:  having some headaches.  Went off trazodone.  Sleeping well without it.  Not as foggy.    Been off about a week.  Company at [employer].  Social [employer] events.  Grandkids back in school.  Granddaughter tough time in school/swimming.  Interested in doing more volunteer work  Stresses:  n/a  Appetite: n/a    Sleep: Doing well.  Outpatient Provider updates: Erika MONTIEL  SI/SIB/HI: n/a  Side effects/compliance:  Interventions:  [facility] engaged in support around anxiety mgmt  Most important:  Doing well     8/26 [facility] only  VBS was successful   Attended family wedding  Friends coming to town  Discussed anxiety mgmt " during transitions  Discussed path of anxiety tx     7/31 [facility] only  Staying busy birthday parties  VBS next week  Being highly social  Son might have tick bite sx- high anxiety, calling him numerous times.  Discussed catatrophization/behaviors  Discussed people pleasing     6/29   update:  Going really well.  Have on going headaches.  I feels manageable however.  Way less brain fog.    Stresses:  Had to put in new furnaces and air conditioners.   Appetite: N/a   Sleep:  N/a  Outpatient Provider updates: Erika RAIN-Mat leave temporarily Nathalie MONTIEL [facility]  SI/SIB/HI: N/a  Side effects/compliance: N/a  Interventions:  [facility] discussed anxiety mgmt and TIPP skills/decision paralysis.   Most important:  Doing well!  Warning signs: not energy, not being social, not having odessa     2/28   update:  Reduced effexor since last visit.  On going well.  Brain fog gone.  Stresses:  Headache pain is reduced.  Appetite: Denies  Sleep: Trazadone works, no problem  Outpatient Provider updates: [facility] Erika DON  Uses CALM lorena daily with afternoon anxiety  SI/SIB/HI:  Denies past attempts.  Previous passive ideation  FARIDA:  Denies  Side effects/compliance:  N/a  Interventions:  [facility] engaged in completing DA with psychoeducation with treatment planning  Most important: Med changes are great.     Does homework and books to read.    Uses CALM lorena  5/4/3/2/1  May I skills  Journaling  Putting thoughts in box, for another day  Word lorena  Making choices can be difficult sets limits    Therapeutic Interventions:  Acceptance and Commitment Therapy (ACT): Worked with patient to identify values and the ways in which their behaviors are inconsistent with those values. Worked with patient to identify behaviors they could engage in to live in a manner that is more consistent with their values.    Response to treatment interventions:   Patient was receptive to interventions utilized.  Patient was engaged in the therapy process.       Progress on Treatment  Objective(s) / Homework:   New Objective established this session - CONTEMPLATION (Considering change and yet undecided); Intervened by assessing the negative and positive thinking (ambivalence) about behavior change     Medication Review:   Changes to psychiatric medications, see updated Medication List in EPIC.      Medication Compliance:   Yes     Chemical Use Review:  Substance Use: Chemical use reviewed, no active concerns identified      Tobacco Use: No current tobacco use.       Assessment: Current Emotional / Mental Status (status of significant symptoms):    Risk status (Self / Other harm or suicidal ideation)   Patient denies a history of suicidal ideation, suicide attempts, self-injurious behavior, homicidal ideation, homicidal behavior, and and other safety concerns   Patient denies current fears or concerns for personal safety.   Patient denies current or recent suicidal ideation or behaviors.   Patient denies current or recent homicidal ideation or behaviors.   Patient denies current or recent self injurious behavior or ideation.   Patient denies other safety concerns.   Recommended that patient call 911 or go to the local ED should there be a change in any of these risk factors      ASSESSMENT:   Mental Status:     Appearance:   Appropriate    Eye Contact:   Good    Psychomotor Behavior: Normal    Attitude:   Cooperative    Orientation:   All   Speech Rate / Production: Normal    Volume:   Normal    Mood:    Normal   Affect:    Appropriate    Thought Content:  Clear    Thought Form:  Coherent  Logical    Insight:    Good          Diagnoses:   Generalized anxiety disorder    Collateral Reports Completed:   Communicated with: Dr chatman        Plan: (Homework, other):   Patient was provided No indications of CD issues  Patient was given information about behavioral services and encouraged to schedule a follow up appointment with the clinic [facility] as needed.         Mica George, [facility],  [facility]     _____________________________________________________________________________________________________________________________________                                              Individual Treatment Plan    Patient's Name: Jolynn Haji   YOB: 1955  Date of Creation: 11/20/24  Date Treatment Plan Last Reviewed/Revised: 11/20/24    DSM5 Diagnoses: 300.02 (F41.1) Generalized Anxiety Disorder  Psychosocial / Contextual Factors: Interpersonal Concerns  PROMIS (reviewed every 90 days):   The following assessments were completed by patient for this visit:  PROMIS 10-Global Health (only subscores and total score):       1/15/2024     7:25 PM 2/25/2024     2:04 PM 5/27/2024     7:01 PM 6/9/2024     8:48 PM 6/23/2024     8:01 PM 9/23/2024     9:33 AM 10/8/2024     4:11 PM   PROMIS-10 Scores Only   Global Mental Health Score 17 17 17 17 17    17 17    17    17 18   Global Physical Health Score 16 17 19 19 19    19 18    18    18 17   PROMIS TOTAL - SUBSCORES 33 34 36 36 36    36 35    35    35 35        Referral / Collaboration:  Referral to another professional/service is not indicated at this time..    Anticipated number of session for this episode of care:  6-9 sessions  Anticipation frequency of session: Monthly  Anticipated Duration of each session: 16-37 minutes  Treatment plan will be reviewed in 90 days or when goals have been changed.       MeasurableTreatment Goal(s) related to diagnosis / functional impairment(s)  Goal 1: Patient will learn and implement coping skills that result in a reduction of anxiety and nervousness.    I will know I've met my goal when I have reduced my anxiety.      Status: New - Date: 11/20/24      Intervention(s)  [facility] will Motivational Interviewing (MI): Validate patient's thoughts, feelings and experience. Express respect for patient's autonomy in decision making.  Ask open-ended questions to invite patient's self-reflection and self-direction around change and what  is important for them in working towards their goals.      Patient has reviewed and agreed to the above plan.     Written by  Mica George LPCC, [facility]

## 2024-11-19 NOTE — PROGRESS NOTES
"Telemedicine Visit: The patient's condition can be safely assessed and treated via synchronous audio and visual telemedicine encounter.      Reason for Telemedicine Visit: Patient has requested telehealth visit    Originating Site (Patient Location): Patient's home    Distant Location (provider location): Off-Site    Consent:  The patient/guardian has verbally consented to: the potential risks and benefits of telemedicine (video visit) versus in person care; bill my insurance or make self-payment for services provided; and responsibility for payment of non-covered services.     Mode of Communication:  Video Conference via [employer]    As the provider I attest to compliance with applicable laws and regulations related to telemedicine.        Outpatient Psychiatric Progress Note    Name: Jolynn RESTREPO Adithya   : 1955                    Primary Care Provider: Helga Ibarra MD   Therapist: Working with DINESH Whipple (therapist may be out early May- maybe out for 3 months)    PHQ-9 scores:      10/8/2024     4:07 PM 10/14/2024    11:10 AM 2024     9:16 AM   PHQ-9 SCORE   PHQ-9 Total Score MyChart 1 (Minimal depression) 1 (Minimal depression) 1 (Minimal depression)   PHQ-9 Total Score 1 1 1        Patient-reported       TIFFANY-7 scores:      2024     2:54 PM 10/12/2024     5:12 PM 10/14/2024    11:11 AM   TIFFANY-7 SCORE   Total Score 1 (minimal anxiety) 1 (minimal anxiety) 3 (minimal anxiety)   Total Score 1 1 3       Patient Identification:  Patient is a 69 year old,   White Choose not to answer female  who presents for return visit with me.  Patient is currently retired. Patient attended the phone/video session alone today. Patient prefers to be called: \" Azalea\".    Interim History:  I last saw Jolynn RESTREPO Adithya for outpatient psychiatry return visit on 2024. During that appointment, we:  Continue trazodone 25-50 mg at bedtime as needed for sleep  Continue Effexor-XR/venlafaxine ER 75 mg " "daily for anxiety.  Continue propranolol EXTENDED RELEASE 60 mg ONCE daily for anxiety, tremor, headaches.  Continue all other cares per primary care provider.     11/20: Pt feels like she stopped trazodone too soon/abruptly. Was having some brain fog from half tablet of trazodone and so now just taking 1/4 of a 50 mg tablet.  Sleeping well. Still having headaches but not daily. Looking forward to the holidays. No acute safety concerns. No SI. No problematic drug or alcohol use. See [facility] note below for additional details.      Per [facility], [facility], during today's team-based visit:  MH update:  Was changing Trazodone dose around was dosing half and 1/4 for the last 3 weeks and things feel off.  Doesn't feel like mood is as good but does feel more clear.  On going headache questions with effexor.   Stresses:  daughter is hosting thanksgiving.  Appetite: n/a  Sleep: n/a  Outpatient Provider updates: Erika MONTIEL  SI/SIB/HI: n/a  Side effects/compliance:  Interventions:  [facility] encouraged a headache log to better track information  Most important:  Wonders about genesight testing, wonders if this would be appropriate.  Trazdone dosing 1/4 or 1/2.  Wonders about effexor and headaches.     Past Psychiatric Med Trials:  Psych Meds at Intake:  lexapro 20 mg daily  abilify 5 mg daily      Past Psych Meds:  Amitriptyline  Quetiapine  Mirtazapine - stopped \"because I was sleeping through the night;\" hallucinations     Psychiatric ROS:  Jolynn Haji reports mood has been: relatively stable  Anxiety has been: Manageable  Sleep has been: Sleeping ok  Seema sxs: None  Psychosis sxs: N/A  ADHD/ADD sxs: N/A  PTSD sxs: N/A  PHQ9 and GAD7 scores were reviewed today if completed.   Medication side effects: headaches?   Current stressors include: Symptoms and See HPI above  Coping mechanisms and supports include: Family, Hobbies and Friends, therapy    Current medications include:   Current Outpatient Medications   Medication Sig " Dispense Refill    clindamycin (CLEOCIN T) 1 % external lotion Apply topically 2 times daily 60 mL 3    lactobacillus rhamnosus, GG, (CULTURELL) capsule Take 1 capsule by mouth daily Taking every other day      propranolol ER (INDERAL LA) 60 MG 24 hr capsule Take 1 capsule (60 mg) by mouth daily 90 capsule 3    venlafaxine (EFFEXOR XR) 75 MG 24 hr capsule Take 1 capsule (75 mg) by mouth daily 90 capsule 3     Current Facility-Administered Medications   Medication Dose Route Frequency Provider Last Rate Last Admin    lidocaine 1 % 4 mL  4 mL INTRA-ARTICULAR Once Helga Ibarra MD        lidocaine 1 % injection 3 mL  3 mL   Giovany Quiroz MD   3 mL at 07/01/22 1120    lidocaine 1 % injection 4 mL  4 mL   Giovany Quiroz MD   4 mL at 07/01/22 1120       Past Medical/Surgical History:  Past Medical History:   Diagnosis Date    Abnormal glandular Papanicolaou smear of cervix- ASCUS in 2000 12/5/2003    ASC H- 2000    Basal cell carcinoma of leg, right 05/2016    Dr Rogers    Colon polyps 05/2018    tubular adenoam - due 5 yrs - 2 mm    Concussion without loss of consciousness 4/12/2017    Hidradenitis     left  groin    HSIL (high grade squamous intraepithelial lesion) on Pap smear of cervix 7/5/2000    Normal paps from 2001 to 2016/cc    Hypertension goal BP (blood pressure) < 140/90     OA (osteoarthritis) of knee     moderate    Postmenopausal since 7/2008     Shingles 7/23/2010    left     Squamous cell carcinoma of skin, unspecified     Synovial cyst of popliteal space       has a past medical history of Abnormal glandular Papanicolaou smear of cervix- ASCUS in 2000 (12/5/2003), Basal cell carcinoma of leg, right (05/2016), Colon polyps (05/2018), Concussion without loss of consciousness (4/12/2017), Hidradenitis, HSIL (high grade squamous intraepithelial lesion) on Pap smear of cervix (7/5/2000), Hypertension goal BP (blood pressure) < 140/90, OA (osteoarthritis) of knee, Postmenopausal (since  7/2008 ), Shingles (7/23/2010), Squamous cell carcinoma of skin, unspecified, and Synovial cyst of popliteal space.    She has no past medical history of Malignant melanoma (H) or Squamous cell carcinoma.    Social History:  Reviewed. No changes to social history except as noted above in HPI.    Vital Signs:   None. This is phone/video visit.     Labs:  Most recent laboratory results reviewed and the pertinent results include:   TSH   Date Value Ref Range Status   02/05/2024 1.26 0.30 - 4.20 uIU/mL Final   04/25/2022 1.30 0.40 - 4.00 mU/L Final   01/13/2021 2.07 0.40 - 4.00 mU/L Final     Lab Results   Component Value Date    WBC 8.1 04/25/2022    WBC 6.4 01/13/2021     Lab Results   Component Value Date    RBC 4.58 04/25/2022    RBC 4.54 01/13/2021     Lab Results   Component Value Date    HGB 13.6 04/25/2022    HGB 13.3 01/13/2021     Lab Results   Component Value Date    HCT 41.1 04/25/2022    HCT 41.7 01/13/2021     No components found for: MCT  Lab Results   Component Value Date    MCV 90 04/25/2022    MCV 92 01/13/2021     Lab Results   Component Value Date    MCH 29.7 04/25/2022    MCH 29.3 01/13/2021     Lab Results   Component Value Date    MCHC 33.1 04/25/2022    MCHC 31.9 01/13/2021     Lab Results   Component Value Date    RDW 12.9 04/25/2022    RDW 13.1 01/13/2021     Lab Results   Component Value Date     04/25/2022     01/13/2021     Last Comprehensive Metabolic Panel:  Sodium   Date Value Ref Range Status   02/05/2024 138 135 - 145 mmol/L Final     Comment:     Reference intervals for this test were updated on 09/26/2023 to more accurately reflect our healthy population. There may be differences in the flagging of prior results with similar values performed with this method. Interpretation of those prior results can be made in the context of the updated reference intervals.    01/13/2021 140 133 - 144 mmol/L Final     Potassium   Date Value Ref Range Status   02/05/2024 4.7 3.4 - 5.3  mmol/L Final   04/25/2022 4.4 3.4 - 5.3 mmol/L Final   01/13/2021 4.1 3.4 - 5.3 mmol/L Final     Chloride   Date Value Ref Range Status   02/05/2024 102 98 - 107 mmol/L Final   04/25/2022 105 94 - 109 mmol/L Final   01/13/2021 108 94 - 109 mmol/L Final     Carbon Dioxide   Date Value Ref Range Status   01/13/2021 28 20 - 32 mmol/L Final     Carbon Dioxide (CO2)   Date Value Ref Range Status   02/05/2024 27 22 - 29 mmol/L Final   04/25/2022 26 20 - 32 mmol/L Final     Anion Gap   Date Value Ref Range Status   02/05/2024 9 7 - 15 mmol/L Final   04/25/2022 6 3 - 14 mmol/L Final   01/13/2021 4 3 - 14 mmol/L Final     Glucose   Date Value Ref Range Status   02/05/2024 92 70 - 99 mg/dL Final   04/25/2022 104 (H) 70 - 99 mg/dL Final   01/13/2021 91 70 - 99 mg/dL Final     Comment:     Fasting specimen     Urea Nitrogen   Date Value Ref Range Status   02/05/2024 17.4 8.0 - 23.0 mg/dL Final   04/25/2022 23 7 - 30 mg/dL Final   01/13/2021 18 7 - 30 mg/dL Final     Creatinine   Date Value Ref Range Status   02/05/2024 0.68 0.51 - 0.95 mg/dL Final   01/13/2021 0.68 0.52 - 1.04 mg/dL Final     GFR Estimate   Date Value Ref Range Status   02/05/2024 >90 >60 mL/min/1.73m2 Final   01/13/2021 >90 >60 mL/min/[1.73_m2] Final     Comment:     Non  GFR Calc  Starting 12/18/2018, serum creatinine based estimated GFR (eGFR) will be   calculated using the Chronic Kidney Disease Epidemiology Collaboration   (CKD-EPI) equation.       Calcium   Date Value Ref Range Status   02/05/2024 9.9 8.8 - 10.2 mg/dL Final   01/13/2021 8.8 8.5 - 10.1 mg/dL Final     Bilirubin Total   Date Value Ref Range Status   02/05/2024 0.7 <=1.2 mg/dL Final   01/13/2021 0.8 0.2 - 1.3 mg/dL Final     Alkaline Phosphatase   Date Value Ref Range Status   02/05/2024 75 40 - 150 U/L Final     Comment:     Reference intervals for this test were updated on 11/14/2023 to more accurately reflect our healthy population. There may be differences in the flagging  of prior results with similar values performed with this method. Interpretation of those prior results can be made in the context of the updated reference intervals.   01/13/2021 72 40 - 150 U/L Final     ALT   Date Value Ref Range Status   02/05/2024 16 0 - 50 U/L Final     Comment:     Reference intervals for this test were updated on 6/12/2023 to more accurately reflect our healthy population. There may be differences in the flagging of prior results with similar values performed with this method. Interpretation of those prior results can be made in the context of the updated reference intervals.     01/13/2021 17 0 - 50 U/L Final     AST   Date Value Ref Range Status   02/05/2024 19 0 - 45 U/L Final     Comment:     Reference intervals for this test were updated on 6/12/2023 to more accurately reflect our healthy population. There may be differences in the flagging of prior results with similar values performed with this method. Interpretation of those prior results can be made in the context of the updated reference intervals.   01/13/2021 13 0 - 45 U/L Final     Review of Systems:  10 systems (general, cardiovascular, respiratory, eyes, ENT, endocrine, GI, , M/S, neurological) were reviewed. Most pertinent finding(s) is/are: Some chronic pains, intermittent mild headaches, see HPI above. The remaining systems are all unremarkable.    Mental Status Examination (limited as this is by phone/video):  Appearance: Awake, alert, appears stated age, no acute distress, well-groomed   Attitude:  cooperative, pleasant   Motor: No gross abnormalities noted today.  Not formally tested  Oriented to:  person, place, time, and situation  Attention Span and Concentration:  normal  Speech: Normal volume, normal rate, normal rhythm, coherent  Language: intact  Mood: Pretty good  Affect: Overall appropriate and mood congruent  Associations:  no loose associations  Thought Process:  logical, linear and goal oriented  Thought  Content:  no evidence of suicidal ideation or homicidal ideation, no evidence of psychotic thought, no auditory hallucinations present and no visual hallucinations present  Recent and Remote Memory: Intact to interview.  Not formally assessed.   Fund of Knowledge: appropriate  Insight: Good  Judgment:  intact, adequate for safety  Impulse Control:  intact    Suicide Risk Assessment:  Today Jolynn Haji reports no suicidal ideation. Based on all available evidence including the factors cited above, Jolynn Haji does not appear to be at imminent risk for self-harm, does not meet criteria for a 72-hr hold, and therefore remains appropriate for ongoing outpatient level of care.  A thorough assessment of risk factors related to suicide and self-harm have been reviewed and are noted above. The patient convincingly denies suicidality on several occasions. Local community safety resources reviewed for patient to use if needed. There was no deceit detected, and the patient presented in a manner that was believable.     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  R/O Underlying Parkinson's Disease (pt has seen neurology - continuing to monitor)    Medical comorbidities include:   Patient Active Problem List    Diagnosis Date Noted    Essential hypertension with goal blood pressure less than 140/90 - then Orthostatic hypotension- resolved off of lisinopril 2.5mg daily and doing daily 2.25 mile walks with her dog 07/16/2001     Priority: High    Orthostatic hypotension- resolved off of lisinopril 2.5mg daily and doing daily 2.25 mile walks with her dog 10/14/2024     Priority: Medium    Major depressive disorder, recurrent, mild (H) 12/06/2023     Priority: Medium    Bilateral lower extremity edema- trace pitting edema distal LE to mid pretibial areas Left > right 09/27/2023     Priority: Medium    Tremor 09/27/2023      Priority: Medium    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 - lower pretibial area  07/12/2023     Priority: Medium    Drug-induced parkinsonism (H)- Abilify - generic = ARIPiprazole( 5mg) - RESOLVED OFF ABILIFY  01/02/2023     Priority: Medium    Mood disorder (H) 01/02/2023     Priority: Medium    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 - much improved - resolved as of 5/5/2023 07/07/2022     Priority: Medium    Adjustment insomnia - resolved as of 5/5/2023 - off of ambien with psychiatry's okay  12/23/2021     Priority: Medium    Screening for cervical cancer      Priority: Medium     1999 NIL  2000 ASCUS-H.  >> Colpo: Neg  2001 NIL   2003 NIL pap, Neg HPV  [id number], 2016  NIL pap  7/25/19 NIL pap, Neg HPV    Criteria necessary to stop screening per ASCCP guidelines:  Older than 65 years  Three consecutive negative cytology results or two consecutive negative cotest results within the previous 10 years, with the most recent being performed within the last 5 years.   (Women with hx of MADDIE 2 or 3, or adenocarcinoma in situ should continue screening for full 20 years, even if this goes past age 65).        Hyperlipidemia LDL goal <130 11/30/2018     Priority: Medium    Basal cell carcinoma      Priority: Medium    Colon polyps 05/01/2018     Priority: Medium    Primary localized osteoarthrosis of left lower leg 02/19/2018     Priority: Medium    Other secondary osteoarthritis of left knee 01/29/2018     Priority: Medium    Chronic constipation 09/14/2017     Priority: Medium    Raynaud's phenomenon without gangrene 09/14/2017     Priority: Medium    Insomnia, unspecified type 07/25/2017     Priority: Medium    TIFFANY (generalized anxiety disorder) 05/23/2017     Priority: Medium    Adjustment disorder with mixed anxiety and depressed mood- mild  at this time 05/11/2017     Priority: Medium    Family history of  celiac disease 05/11/2017     Priority: Medium    Basal cell carcinoma of leg, right 05/01/2016     Priority: Medium    Sun-damaged skin 04/21/2016     Priority: Medium    Seasonal allergic rhinitis 04/21/2016     Priority: Medium    Dupuytren's disease of palm - right 4th digit flexor tendon 06/11/2015     Priority: Medium    History of migraine headaches- assoc. with nausea/vomiting - resolved around menopause - were  premenstrual  01/08/2014     Priority: Medium    Rosacea 05/01/2013     Priority: Medium    Numbness of feet- distal feet R>L  02/21/2011     Priority: Medium    NUMBNESS AND TINGLING OF FOOT - bilateral -mild  08/25/2010     Priority: Medium    Osteopenia 01/02/2009     Priority: Medium    Lipidosis 10/24/2006     Priority: Medium     Problem list name updated by automated process. Provider to review      Localized osteoarthritis of hand 11/01/2005     Priority: Medium     Problem list name updated by automated process. Provider to review      Stress incontinence, female - mild 10/23/2012     Priority: Low    Cough 10/23/2012     Priority: Low    Acute bronchitis- recurrent- ? related to mold in school building- pt has since retired and no further bronchitis 10/23/2012     Priority: Low    Postmenopausal      Priority: Low    DERMATOPHYTOSIS OF NAIL- toenails  03/14/2006     Priority: Low     trichophyton rubrum on culture - 1/06       Synovial cyst of popliteal space      Priority: Low    Hidradenitis 12/05/2003     Priority: Low       Psychosocial & Contextual Factors: see HPI above    Assessment:  From Intake, 5/12/2022:  Jolynn Haji is a 66-year-old female with a history including anxiety, depression, insomnia status post concussion in April 2017.  No major mental health struggles prior to concussion in 2017.  Patient was started on Lexapro, quetiapine, mirtazapine to help with her symptoms.  Patient had been feeling much better on that medication combination and once she was sleeping well again  quetiapine and mirtazapine were discontinued.  This past January the patient started to have some worsening symptoms and Abilify was added.  Patient has felt like her current medications of Lexapro and Abilify have been helpful but reports starting to feel really slowed and numb the past several weeks.  She also reported a weight loss of nearly 20 pounds over the last 6 weeks versus several months (patient was not very sure). I am wondering if she could be experiencing side effects of her increased Lexapro dose.  She is agreeable to decreasing her dose slightly to 15 mg daily.  Could also consider decreasing Abilify in the case it is not her Lexapro.  She also desires to sleep a little bit better than she has been.  Could consider restarting mirtazapine at bedtime for sleep and to also augment her Lexapro.  Could also be beneficial for patient to undergo cognitive screening to check in and see what her baseline is status post concussion.  No acute safety concerns.  No SI.  No problematic drug or alcohol use.    5/24/2022:  Patient with some slight improvements of possible medication related side effects after decreasing Lexapro.  I am wondering if there might be more robust improvements with decreasing Abilify/aripiprazole.  Possible drug-induced Parkinsonism?  She does seem to have delayed/slowed speech, masked facies.  Her gait and other movements unable to be observed but she does report other struggles that may be consistent with such a presentation.  We are going to decrease her Abilify down to 2 mg daily and I will see her back in just a few weeks.  We will likely discontinue her Abilify at her next visit.  I will message her primary care provider to get some collateral regarding previous baseline functioning prior to starting Abilify.  History of TBI could be confounding presentation.  Could consider neurology consult.    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  Instructed  to stop Abilify today.  We will monitor for improvement of symptoms.  If symptoms do not improve may need to refer to neurology.  If symptoms start to improve we will consider low-dose methylphenidate augmentation of Lexapro next visit to help with low energy, mood, focus and concentration, cognition, s/p TBI if still clinically indicated.  We would discuss risks and benefits with patient and her daughter at that time.  No other acute safety concerns or SI.  No problematic drug or alcohol use.  If patient starts to struggle with sleep would restart mirtazapine at bedtime.    7/28/2022:  Pt still with sxs consistent with drug-induced Parksinsonism.  Neurology consult will be placed so that patient might get scheduled sooner than later due to the typically long wait.  We will start Ambien in hopes we can get her sleeping much better.  Discussed risks and benefits of its use, particularly in the geriatric population.  Lexapro has not been as helpful as it helped for her anxiety.  We will taper this medication and start Effexor/venlafaxine in hopes that might be more effective for her symptoms.  No acute safety concerns.  No SI.  No problematic drug or alcohol use.    8/11/2022:  Overall with continued improved symptoms.  Hopefully patient will continue to have improvement of her symptoms off of Abilify.  Patient will be seeing neurology soon.  Encouraged to keep appointment.  We will continue taper off of Lexapro and continue on monotherapy with venlafaxine.  We will also continue Ambien at this time for sleep since patient is sleeping much better on the medication with improved symptoms and tolerating well.  No acute safety concerns.  No problematic drug or alcohol use.    9/1/2022:  Ongoing anxiety, still intense at times.  Still not sleeping very well.  Could consider sleep evaluation, but patient quite overwhelmed right now with various referrals and doctors appointments.  Briseyda had been working quite well but not  keeping her sleep now.  Still falling asleep relatively well.  We will transition to Ambien CR to see if that is more helpful.  We will continue to consider sleep evaluation to rule out possibility of REM behavior sleep disorder.  We will also transition venlafaxine to bedtime to see if that helps with any possible nausea.  Working with neurology to monitor for Parkinson's disease.  I do recommend neuropsychological testing due to ongoing possible cognitive difficulties/struggles.  No acute safety concerns.  No SI.  No problematic drug or alcohol use.    10/3/2022:  Patient overall with some ongoing anxiety but slowly improving since it got worse late August.  Recommend continuing to optimize Effexor-XR, especially since patient tolerating well.  No longer having any nausea now that dose is taken at bedtime.  Patient feeling a little foggy and off balance with Ambien CR and so we will switch back to Ambien immediate release.  No falls.  Recommend increasing Effexor-XR first and once well tolerated switching back to Ambien immediate release.  May need to continue to work on alternative options for sleep if does not do well back on Ambien immediate release.  No acute safety concerns.  No SI.  No problematic drug or alcohol use.  Encouraged to continue working with neurology.  Next appointment in November.    10/24/2022:  Patient overall with some improved anxiety on increased dose of Effexor-XR.  Could use additional improvement of anxiety and so we will continue to optimize therapy with Effexor-XR.  We will increase dose to 150 mg daily.  Tolerating well with no notable negative side effects.  Patient transitioned back to Ambien CR and is sleeping better on this compared to Ambien immediate release.  We will continue Ambien CR for sleep.  Discussed possibility of sleep medicine referral but patient declines at this time.  Could consider in the future.  No acute safety concerns.  No SI.  No problematic drug or alcohol  use.  Encouraged to continue working with neurology.    12/09/2022:  Patient overall doing fairly well.  Feels like increased dose of venlafaxine has been helpful.  I do wonder if she is having some subtle withdrawal symptoms late in the afternoon with headache and fatigue.  Discussed shifting her dose to the morning.  However, she reports she will try to drink more water and see if that is helpful before trying to make changes to her medication.  She feels like things are going well enough that she really does not want to change anything.  Encouraged to continue in therapy.  No acute safety concerns.  No SI.  No problematic drug or alcohol use.    1/19/2023:  Patient overall doing well with ongoing improvements.  Patient even recently starting to drive again which is a big deal.  She continues in therapy and finds it helpful.  Discussed possible changes to her dosing to see if headaches might improve, along with fatigue.  Discussed possibly splitting her Effexor-XR dose as 75 mg twice daily versus 37 point 5 in the morning and 112.5 mg at bedtime.  Patient feels like she is in a good place right now and really does not want to make any medication changes if possible.  We will keep things the same for now.  Encouraged to drink lots of water.  No acute safety concerns.  No SI.  No problematic drug or alcohol use.    3/9/2023:  Since last visit we decreased Effexor-XR just slightly.  Unfortunately her anxiety has worsened on decreased dose.  Headaches and fatigue did not improve at all.  Since anxiety worsened we will go back up to 150 mg daily.  Patient prefers this over a trial of further decreased dose.  We will start a trial with propranolol to see if this helps improve anxiety and headaches.  May also help with her tremor.  Discussed watching for feeling too lightheaded or dizzy.  Patient also on low-dose lisinopril.  Last vital signs blood pressure 132/78 and pulse 96 on 2/24/2023.  No acute safety concerns.  No  SI.  No problematic drug or alcohol use.    3/23/2023:  Patient overall doing very well.  Propranolol has been quite helpful.  Still having some anxiety and headaches so we will continue to optimize therapy with propranolol some.  We will increase to 20 mg twice daily.  Neuropsychiatric testing reviewed.  Some mild deficits but nothing indicating a neurocognitive disorder at this time.  This was reassuring to the patient.  Patient will review results with neurologist at upcoming appointment in May.  No acute safety concerns.  No SI.  No problematic drug or alcohol use.    4/27/2023:  Patient overall with relative stability of mood and anxiety symptoms.  We will work to discontinue Ambien to see if we can manage on medication such as trazodone.  I still continue to worry about possibility of prodromal Parkinson's disease.  Patient reported her sister noted patient to be yelling and screaming in her sleep the other night.  This concerns me for possible REM sleep behavior disorder.  Could simply be related to antidepressant therapy-but given recent parkinsonism response to neuroleptic, symptoms should continue to be monitored closely.  Could again consider sleep medicine referral at some point in the future if needed.  Patient also continues to have headaches.  If patient remains stable would like to trial decreased dose of Effexor-XR to see if this helps improve headaches.  No acute safety concerns.  No SI.  No problematic drug or alcohol use.    5/11/2023:  Patient overall doing well.  Was able to replace Ambien with trazodone and still sleeping well.  Still some headaches but greatly improved off Ambien.  Went back to taking propranolol 20 mg twice daily and finds this most effective for her anxiety, tremor, etc.  Discussed we could consider lowering venlafaxine ER in a couple of months if still doing really well and still having some headaches.  Patient has a neurology appointment tomorrow for check-in.  No acute  safety concerns.  No SI.  No problematic drug or alcohol use.    7/11/2023:  Patient overall doing well still.  Mood has remained stable.  Anxiety overall manageable and stable as well.  Unfortunately continues to have headaches and feel fatigued.  Discussed I would like to move ahead with a trial of decreased venlafaxine.  Patient agreeable and will monitor mental health symptoms.  No acute safety concerns.  No SI.  No problematic drug or alcohol use.    10/10/2023:  Patient overall doing okay.  Unfortunately headaches and fatigue have persisted even with decreased dose of venlafaxine.  We will increase propranolol and changed to extended release so it is once a day to see if that is a little bit more helpful for anxiety and headaches.  Patient controlled decreased dose of venlafaxine and we may consider changing to something different after the holidays.  We would like to prevent any decompensation during the holiday season.  No acute safety concerns.  No SI.  No problematic drug or alcohol use.  Patient continues in therapy.    1/9/2024:  Patient overall doing relatively well.  Still getting headaches from venlafaxine so we will decrease the dose further to 75 mg daily to see if helpful without worsening mental health symptoms.  No acute safety concerns.  No SI.  No problematic drug or alcohol use.    2/28/2024:  Patient doing relatively well.  Still some anxiety but manageable.  Anxiety does not currently interfere with patient's ability to function or go about daily activities.  Easily able to distract self.  No major mood symptoms at this time.  Side effects from venlafaxine have drastically improved on lower dose.  If mental health symptoms/anxiety were to worsen in the future, would strongly consider transition to sertraline due to negative side effects on higher doses of venlafaxine.  No acute safety concerns.  No SI.  No problematic drug or alcohol use.    6/26/2024:  Patient overall doing very well.  Mood  has been stable.  Anxiety manageable.  Does use Tylenol twice a day every day for headaches.  Discussed possibility of some rebound headaches.  Resources sent to patient via [employer].  We will continue to monitor dizziness.  Blood pressures can be a little low at times.  We discussed trazodone can cause dizziness and orthostatic hypotension (blood pressure drops upon standing).  Patient wonders if it is more related to her lisinopril.  Patient encouraged to document blood pressures and present to primary care provider for ongoing evaluation.  No acute safety concerns.  No SI.  No problematic drug or alcohol use.  No medication changes today.    9/26/2024:  Patient overall doing well.  Relatively stable.  Managing anxiety symptoms well.  Sleeping well without nightly trazodone.  Discussed patient could consider massage therapy or acupuncture to see if it improves headaches.  Patient prefers not to change Effexor at this time.  Discussed patient could also talk with primary care provider about possible neurology referral for chronic headaches.  No acute safety concerns.  No SI.  No problematic drug or alcohol use.  No med changes today.    11/20/2024:  Pt doing ok - still some ongoing anxiety and headaches - possibly from Effexor. Pt will consider transitioning off Effexor-XR at next appt 1/2 (after the holidays). Could start fluoxetine 10 mg daily for 5 days (with effexor-xr 75) then decrease Effexor-XR to 37.5 mg (and increase fluoxetine to 20 mg daily) for 7 days then stop Effexor-xr and either continue of fluoxetine or trial a new medication (duloxetine?). No acute safety concerns. No SI. No problematic drug or alcohol use.      Medication side effects and alternatives were reviewed. Health promotion activities recommended and reviewed today. All questions addressed. Education and counseling completed regarding risks and benefits of medications and psychotherapy options. Recommend therapy for additional support.      Treatment Plan:   Continue trazodone 12.5-50 mg at bedtime as needed for sleep  Continue Effexor-XR/venlafaxine ER 75 mg daily for anxiety.  Continue propranolol EXTENDED RELEASE 60 mg ONCE daily for anxiety, tremor, headaches.  Continue all other cares per primary care provider.   Continue all other medications as reviewed per electronic medical record today.   Safety plan reviewed. To the Emergency Department as needed or call after hours crisis line at [phone] or [phone]. [place] Crisis Text Line. Text [place] to 129278 or Suicide LifeLine Chat: suicidepreventionBlue Sky Biotechline.org/chat  Continue individual psychotherapy for additional support and ongoing development of nonpharmacologic coping skills and strategies.  Schedule an appointment with me in 6-8 weeks or sooner as needed. Call [facility] at [phone] to schedule.  Follow up with primary care provider as planned or for acute medical concerns.  Call the psychiatric nurse line with medication questions or concerns at [phone].  [employer] may be used to communicate with your provider, but this is not intended to be used for emergencies.    Administrative Billing:   Phone Call/Video Duration: 19 Minutes  Start: 11:04a  Stop: 11:23a    The longitudinal plan of care for the diagnosis(es)/condition(s) as documented were addressed during this visit. Due to the added complexity in care, I will continue to support [place]a in the subsequent management and with ongoing continuity of care.    Patient Status:  Continuous care patient.     Signed:   Judy Pompa DO  [facility]    Disclaimer: This note consists of symbols derived from keyboarding, dictation and/or voice recognition software. As a result, there may be errors in the script that have gone undetected. Please consider this when interpreting information found in this chart.

## 2024-11-20 ENCOUNTER — VIRTUAL VISIT (OUTPATIENT)
Dept: BEHAVIORAL HEALTH | Facility: CLINIC | Age: 69
End: 2024-11-20
Payer: COMMERCIAL

## 2024-11-20 ENCOUNTER — VIRTUAL VISIT (OUTPATIENT)
Dept: PSYCHIATRY | Facility: CLINIC | Age: 69
End: 2024-11-20
Payer: COMMERCIAL

## 2024-11-20 DIAGNOSIS — F41.1 GAD (GENERALIZED ANXIETY DISORDER): Primary | ICD-10-CM

## 2024-11-20 DIAGNOSIS — Z87.820 HISTORY OF TRAUMATIC BRAIN INJURY: ICD-10-CM

## 2024-11-20 DIAGNOSIS — G47.00 INSOMNIA, UNSPECIFIED TYPE: ICD-10-CM

## 2024-11-20 DIAGNOSIS — F41.1 GENERALIZED ANXIETY DISORDER: Primary | ICD-10-CM

## 2024-11-20 PROCEDURE — 90832 PSYTX W PT 30 MINUTES: CPT | Mod: 95 | Performed by: COUNSELOR

## 2024-11-20 RX ORDER — TRAZODONE HYDROCHLORIDE 50 MG/1
25-50 TABLET, FILM COATED ORAL
Qty: 90 TABLET | Refills: 1 | Status: SHIPPED | OUTPATIENT
Start: 2024-11-20

## 2024-11-20 ASSESSMENT — PAIN SCALES - GENERAL: PAINLEVEL_OUTOF10: NO PAIN (0)

## 2024-11-20 NOTE — PATIENT INSTRUCTIONS
Treatment Plan:   Continue trazodone 12.5-50 mg at bedtime as needed for sleep  Continue Effexor-XR/venlafaxine ER 75 mg daily for anxiety.  Continue propranolol EXTENDED RELEASE 60 mg ONCE daily for anxiety, tremor, headaches.  Continue all other cares per primary care provider.   Continue all other medications as reviewed per electronic medical record today.   Safety plan reviewed. To the Emergency Department as needed or call after hours crisis line at [phone] or [phone]. [place] Crisis Text Line. Text [place] to 048748 or Suicide LifeLine Chat: suicidepreventionlifeline.org/chat  Continue individual psychotherapy for additional support and ongoing development of nonpharmacologic coping skills and strategies.  Schedule an appointment with me in 6-8 weeks or sooner as needed. Call [facility] at [phone] to schedule.  Follow up with primary care provider as planned or for acute medical concerns.  Call the psychiatric nurse line with medication questions or concerns at [phone].  [employer] may be used to communicate with your provider, but this is not intended to be used for emergencies.

## 2024-11-20 NOTE — NURSING NOTE
Is the patient currently in the state of [place]? YES    Current patient location: [id number]  [facility] [address]    Visit mode:VIDEO    If the visit is dropped, the patient can be reconnected by: VIDEO VISIT: Text to cell phone:   Telephone Information:   Mobile [phone]       Will anyone else be joining the visit? No  (If patient encounters technical issues they should call [phone])    Are changes needed to the allergy or medication list? Yes Pt is now taking trazodone 50mg cut in half - 25mg or less total.     Are refills needed on medications prescribed by this physician? No    Rooming Documentation: Questionnaire(s) completed.    Reason for visit: RECHECK     BRET Tomas

## 2024-12-02 ASSESSMENT — ANXIETY QUESTIONNAIRES
GAD7 TOTAL SCORE: 2
5. BEING SO RESTLESS THAT IT IS HARD TO SIT STILL: NOT AT ALL
GAD7 TOTAL SCORE: 2
IF YOU CHECKED OFF ANY PROBLEMS ON THIS QUESTIONNAIRE, HOW DIFFICULT HAVE THESE PROBLEMS MADE IT FOR YOU TO DO YOUR WORK, TAKE CARE OF THINGS AT HOME, OR GET ALONG WITH OTHER PEOPLE: NOT DIFFICULT AT ALL
8. IF YOU CHECKED OFF ANY PROBLEMS, HOW DIFFICULT HAVE THESE MADE IT FOR YOU TO DO YOUR WORK, TAKE CARE OF THINGS AT HOME, OR GET ALONG WITH OTHER PEOPLE?: NOT DIFFICULT AT ALL
1. FEELING NERVOUS, ANXIOUS, OR ON EDGE: SEVERAL DAYS
2. NOT BEING ABLE TO STOP OR CONTROL WORRYING: NOT AT ALL
4. TROUBLE RELAXING: SEVERAL DAYS
GAD7 TOTAL SCORE: 2
6. BECOMING EASILY ANNOYED OR IRRITABLE: NOT AT ALL
7. FEELING AFRAID AS IF SOMETHING AWFUL MIGHT HAPPEN: NOT AT ALL
3. WORRYING TOO MUCH ABOUT DIFFERENT THINGS: NOT AT ALL
7. FEELING AFRAID AS IF SOMETHING AWFUL MIGHT HAPPEN: NOT AT ALL

## 2024-12-02 ASSESSMENT — PATIENT HEALTH QUESTIONNAIRE - PHQ9
10. IF YOU CHECKED OFF ANY PROBLEMS, HOW DIFFICULT HAVE THESE PROBLEMS MADE IT FOR YOU TO DO YOUR WORK, TAKE CARE OF THINGS AT HOME, OR GET ALONG WITH OTHER PEOPLE: SOMEWHAT DIFFICULT
SUM OF ALL RESPONSES TO PHQ QUESTIONS 1-9: 2
SUM OF ALL RESPONSES TO PHQ QUESTIONS 1-9: 2

## 2024-12-02 NOTE — PROGRESS NOTES
[facility] Integrated Behavioral Health  December 3, 2024      Behavioral Health Clinician Progress Note    Patient Name: Jolynn Haji           Service Type:  Individual      Service Location:   MyChart / Email (patient reached)     Session Start Time: 9:30 am   Session End Time: 10:00 am      Session Length: 16 - 37      Attendees: Patient     Service Modality:  Video Visit:      Provider verified identity through the following two step process.  Patient provided:  Patient is known previously to provider    Telemedicine Visit: The patient's condition can be safely assessed and treated via synchronous audio and visual telemedicine encounter.      Reason for Telemedicine Visit: Patient has requested telehealth visit    Originating Site (Patient Location): Patient's home    Distant Site (Provider Location): [facility]    Consent:  The patient/guardian has verbally consented to: the potential risks and benefits of telemedicine (video visit) versus in person care; bill my insurance or make self-payment for services provided; and responsibility for payment of non-covered services.     Patient would like the video invitation sent by:  My Chart    Mode of Communication:  Video Conference via [employer]    As the provider I attest to compliance with applicable laws and regulations related to telemedicine.    Visit Activities (Refresh list every visit): [facility] [place] Only    Diagnostic Assessment Date: 2/28/24 (updated)  Treatment Plan Review Date: 12/11/24  See Flowsheets for today's PHQ-9 and TIFFANY-7 results  Previous PHQ-9:       10/14/2024    11:10 AM 11/18/2024     9:16 AM 12/2/2024     5:16 PM   PHQ-9 SCORE   PHQ-9 Total Score [facility] 1 (Minimal depression) 1 (Minimal depression) 2 (Minimal depression)   PHQ-9 Total Score 1 1  2        Patient-reported     Previous TIFFANY-7:       10/12/2024     5:12 PM 10/14/2024    11:11 AM 12/2/2024     5:19 PM   TIFFANY-7 SCORE   Total  Score 1 (minimal anxiety) 3 (minimal anxiety) 2 (minimal anxiety)   Total Score 1 3 2        Patient-reported       KARINA LEVEL:      8/27/2010     3:00 PM 2/22/2011    11:00 AM   KARINA Score (Last Two)   KARINA Raw Score 51 49   Activation Score 91.6 82.8   KARINA Level 4 4       DATA  Extended Session (60+ minutes): No  Interactive Complexity: No  Crisis: No  H Patient: No    Treatment Objective(s) Addressed in This Session:  Target Behavior(s):  anxiety and depression    Depressed Mood: Increase interest, engagement, and pleasure in doing things  Decrease frequency and intensity of feeling down, depressed, hopeless  Improve quantity and quality of night time sleep / decrease daytime naps  Feel less tired and more energy during the day   Improve diet, appetite, mindful eating, and / or meal planning  Identify negative self-talk and behaviors: challenge core beliefs, myths, and actions  Improve concentration, focus, and mindfulness in daily activities   Feel less fidgety, restless or slow in daily activities / interpersonal interactions  Anxiety: will experience a reduction in anxiety, will develop more effective coping skills to manage anxiety symptoms, will develop healthy cognitive patterns and beliefs and will increase ability to function adaptively    Current Stressors / Issues:  Reports Thanksgiving was really good. Discussed triggers for anxiety, one being the Christmas holiday approaching. Discussed a learning opportunity of not doing certain things to better her overall wellbeing. This is uncomfortable but discussed how the discomfort will pass. Pt got an acupuncture mat from [facility] and it has been really calming and helps with relaxing. Reports this helps elevate headaches as well for a little bit. Discussed meeting with Dr. Pompa regarding medications. Going to revisit possible medication changes in January. Discussed increased  fatigue despite having a good nights sleep. Discussed increasing exercise as she has  decreased significantly with changes in weather. Pt reports on progress with letting others know when she feels activities are too much for her at that time. This is becoming more natural.            Progress on Treatment Objective(s) / Homework:  Satisfactory progress - ACTION (Actively working towards change); Intervened by reinforcing change plan / affirming steps taken    Motivational Interviewing    MI Intervention: Expressed Empathy/Understanding, Supported Autonomy, Collaboration, Evocation, Open-ended questions, Change talk (evoked) and Reframe     Change Talk Expressed by the Patient: Desire to change Ability to change Reasons to change Need to change    Provider Response to Change Talk: E - Evoked more info from patient about behavior change, A - Affirmed patient's thoughts, decisions, or attempts at behavior change, R - Reflected patient's change talk and S - Summarized patient's change talk statements      Care Plan review completed: Yes    Medication Review:  No changes to current psychiatric medication(s)    Medication Compliance:  Yes    Changes in Health Issues:   None reported    Chemical Use Review:   Substance Use: Chemical use reviewed, no active concerns identified      Tobacco Use: No current tobacco use.      Assessment: Current Emotional / Mental Status (status of significant symptoms):  Risk status (Self / Other harm or suicidal ideation)  Patient denies a history of suicidal ideation, suicide attempts, self-injurious behavior, homicidal ideation, homicidal behavior and and other safety concerns  Patient denies current fears or concerns for personal safety.  Patient denies current or recent suicidal ideation or behaviors.  Patient denies current or recent homicidal ideation or behaviors.  Patient denies current or recent self injurious behavior or ideation.  Patient denies other safety concerns.  A safety and risk management plan has not been developed at this time, however patient was  encouraged to call [place] [phone] should there be a change in any of these risk factors.    Appearance:   Appropriate   Eye Contact:   Good   Psychomotor Behavior: Normal   Attitude:   Cooperative   Orientation:   All  Speech   Rate / Production: Normal    Volume:  Normal   Mood:    Anxious   Affect:    Appropriate   Thought Content:  Clear   Thought Form:  Coherent  Logical   Insight:    Good     Diagnoses:  1. Insomnia, unspecified type    2. Generalized anxiety disorder        Collateral Reports Completed:  Not Applicable    Plan: (Homework, other):  Patient was given information about behavioral services and encouraged to schedule a follow up appointment with the clinic [facility] in 1 week.  She was also given information about mental health symptoms and treatment options .  CD Recommendations: No indications of CD issues.   Erika Francisco MSW LICSW      ______________________________________________________________________    Integrated Primary Care Behavioral Health Treatment Plan    Patient's Name: Jolynn Haji  YOB: 1955    Date of Creation: 10/5/22  Date Treatment Plan Last Reviewed/Revised: 5/28/24    DSM5 Diagnoses: 300.02 (F41.1) Generalized Anxiety Disorder  Psychosocial / Contextual Factors: Individual Factors high anxiety associated with depressive sx, interfereing with ability to function as she would like.  .  PROMIS (reviewed every 90 days):     Referral / Collaboration:  Was/were discussed and patient will pursue. - individual therapy    Anticipated number of session for this episode of care: 5-8 sessions  Anticipation frequency of session: Weekly  Anticipated Duration of each session: 38-52 minutes  Treatment plan will be reviewed in 90 days or when goals have been changed.       MeasurableTreatment Goal(s) related to diagnosis / functional impairment(s)  Goal 1: Patient will work with provider to manage symptoms    I will know I've met my goal when when less anxious and feeling down.       Objective #A (Patient Action)    Patient will   attend all sessions .  Status: Continued - Date(s): 9/11/24    Intervention(s)  Therapist will teach  educate patient on treatment options, clarify concerns, work with pt to overcome any resistance to compliance. .    Objective #B  Patient will identify 3 fears / thoughts that contribute to feeling anxious.  Status: Continued - Date(s): 9/11/24    Intervention(s)  Therapist will  educate patient on treatment options, clarify concerns, work with pt to overcome any resistance to compliance. .    Objective #C  Patient will identify 3 initial signs or symptoms of anxiety.  Status: Continued - Date(s): 9/11/24    Intervention(s)  Therapist will  educate patient on treatment options, clarify concerns, work with pt to overcome any resistance to compliance. .        Patient has reviewed and agreed to the above plan.      Erika Francisco [facility]  November 19, 2024      Answers submitted by the patient for this visit:  Patient Health Questionnaire (Submitted on 12/2/2024)  If you checked off any problems, how difficult have these problems made it for you to do your work, take care of things at home, or get along with other people?: Somewhat difficult  PHQ9 TOTAL SCORE: 2  Patient Health Questionnaire (G7) (Submitted on 12/2/2024)  TIFFANY 7 TOTAL SCORE: 2

## 2024-12-03 ENCOUNTER — VIRTUAL VISIT (OUTPATIENT)
Dept: BEHAVIORAL HEALTH | Facility: CLINIC | Age: 69
End: 2024-12-03
Payer: COMMERCIAL

## 2024-12-03 DIAGNOSIS — F41.1 GENERALIZED ANXIETY DISORDER: ICD-10-CM

## 2024-12-03 DIAGNOSIS — G47.00 INSOMNIA, UNSPECIFIED TYPE: Primary | ICD-10-CM

## 2024-12-03 PROCEDURE — 90832 PSYTX W PT 30 MINUTES: CPT | Mod: 95 | Performed by: SOCIAL WORKER

## 2024-12-10 NOTE — PROGRESS NOTES
[facility] Integrated Behavioral Health  December 16, 2024      Behavioral Health Clinician Progress Note    Patient Name: Jolynn Haji           Service Type:  Individual      Service Location:   MyChart / Email (patient reached)     Session Start Time: 1:58 pm   Session End Time: 2:40 pm      Session Length: 38 - 52      Attendees: Patient     Service Modality:  Video Visit:      Provider verified identity through the following two step process.  Patient provided:  Patient is known previously to provider    Telemedicine Visit: The patient's condition can be safely assessed and treated via synchronous audio and visual telemedicine encounter.      Reason for Telemedicine Visit: Patient has requested telehealth visit    Originating Site (Patient Location): Patient's home    Distant Site (Provider Location): [facility]    Consent:  The patient/guardian has verbally consented to: the potential risks and benefits of telemedicine (video visit) versus in person care; bill my insurance or make self-payment for services provided; and responsibility for payment of non-covered services.     Patient would like the video invitation sent by:  My Chart    Mode of Communication:  Video Conference via Amwell    As the provider I attest to compliance with applicable laws and regulations related to telemedicine.    Visit Activities (Refresh list every visit): [facility] Only    Diagnostic Assessment Date: 2/28/24 (updated)  Treatment Plan Review Date: 12/11/24  See Flowsheets for today's PHQ-9 and TIFFANY-7 results  Previous PHQ-9:       10/14/2024    11:10 AM 11/18/2024     9:16 AM 12/2/2024     5:16 PM   PHQ-9 SCORE   PHQ-9 Total Score [facility] 1 (Minimal depression) 1 (Minimal depression) 2 (Minimal depression)   PHQ-9 Total Score 1 1  2        Patient-reported     Previous TIFFANY-7:       10/12/2024     5:12 PM 10/14/2024    11:11 AM 12/2/2024     5:19 PM   TIFFANY-7 SCORE   Total  Score 1 (minimal anxiety) 3 (minimal anxiety) 2 (minimal anxiety)   Total Score 1 3 2        Patient-reported       KARINA LEVEL:      8/27/2010     3:00 PM 2/22/2011    11:00 AM   KARINA Score (Last Two)   KARINA Raw Score 51 49   Activation Score 91.6 82.8   KARINA Level 4 4       DATA  Extended Session (60+ minutes): No  Interactive Complexity: No  Crisis: No  [facility] Patient: No    Treatment Objective(s) Addressed in This Session:  Target Behavior(s):  anxiety and depression    Depressed Mood: Increase interest, engagement, and pleasure in doing things  Decrease frequency and intensity of feeling down, depressed, hopeless  Improve quantity and quality of night time sleep / decrease daytime naps  Feel less tired and more energy during the day   Improve diet, appetite, mindful eating, and / or meal planning  Identify negative self-talk and behaviors: challenge core beliefs, myths, and actions  Improve concentration, focus, and mindfulness in daily activities   Feel less fidgety, restless or slow in daily activities / interpersonal interactions  Anxiety: will experience a reduction in anxiety, will develop more effective coping skills to manage anxiety symptoms, will develop healthy cognitive patterns and beliefs and will increase ability to function adaptively    Current Stressors / Issues:  Report having more a much more difficult time with her mental health since last visit. Anxiety was very high, sleep was worse and had a difficult time eating. She also was struggling with isolation. Reports she went to PCP to get look at meds and they increased her dose. Feeling some improvement with eating, better sleep, less heart palpitations and reduced headaches. Continues to work on voicing needs with friends and family.  She has been very open with family and friends about her current struggles. Discussed provider leaving [facility]. Made referral for [facility] today for long term therapy.     12/3  Reports Thanksgiving was really good. Discussed  triggers for anxiety, one being the [place] holiday approaching. Discussed a learning opportunity of not doing certain things to better her overall wellbeing. This is uncomfortable but discussed how the discomfort will pass. Pt got an acupuncture mat from [facility] and it has been really calming and helps with relaxing. Reports this helps elevate headaches as well for a little bit. Discussed meeting with Dr. Pompa regarding medications. Going to revisit possible medication changes in January. Discussed increased  fatigue despite having a good nights sleep. Discussed increasing exercise as she has decreased significantly with changes in weather. Pt reports on progress with letting others know when she feels activities are too much for her at that time. This is becoming more natural.            Progress on Treatment Objective(s) / Homework:  Satisfactory progress - ACTION (Actively working towards change); Intervened by reinforcing change plan / affirming steps taken    Motivational Interviewing    MI Intervention: Expressed Empathy/Understanding, Supported Autonomy, Collaboration, Evocation, Open-ended questions, Change talk (evoked) and Reframe     Change Talk Expressed by the Patient: Desire to change Ability to change Reasons to change Need to change    Provider Response to Change Talk: E - Evoked more info from patient about behavior change, A - Affirmed patient's thoughts, decisions, or attempts at behavior change, R - Reflected patient's change talk and S - Summarized patient's change talk statements      Care Plan review completed: Yes    Medication Review:  No changes to current psychiatric medication(s)    Medication Compliance:  Yes    Changes in Health Issues:   None reported    Chemical Use Review:   Substance Use: Chemical use reviewed, no active concerns identified      Tobacco Use: No current tobacco use.      Assessment: Current Emotional / Mental Status (status of significant symptoms):  Risk status  (Self / Other harm or suicidal ideation)  Patient denies a history of suicidal ideation, suicide attempts, self-injurious behavior, homicidal ideation, homicidal behavior and and other safety concerns  Patient denies current fears or concerns for personal safety.  Patient denies current or recent suicidal ideation or behaviors.  Patient denies current or recent homicidal ideation or behaviors.  Patient denies current or recent self injurious behavior or ideation.  Patient denies other safety concerns.  A safety and risk management plan has not been developed at this time, however patient was encouraged to call [place] [phone] should there be a change in any of these risk factors.    Appearance:   Appropriate   Eye Contact:   Good   Psychomotor Behavior: Normal   Attitude:   Cooperative   Orientation:   All  Speech   Rate / Production: Normal    Volume:  Normal   Mood:    Anxious   Affect:    Appropriate   Thought Content:  Clear   Thought Form:  Coherent  Logical   Insight:    Good     Diagnoses:  1. Generalized anxiety disorder    2. Insomnia, unspecified type        Collateral Reports Completed:  Not Applicable    Plan: (Homework, other):  Patient was given information about behavioral services and encouraged to schedule a follow up appointment with the clinic [facility] in 1 week.  She was also given information about mental health symptoms and treatment options .  CD Recommendations: No indications of CD issues.   Erika Mathiser [facility]      ______________________________________________________________________    Integrated Primary Care Behavioral Health Treatment Plan    Patient's Name: Jolynn Haji  YOB: 1955    Date of Creation: 10/5/22  Date Treatment Plan Last Reviewed/Revised: 5/28/24    DSM5 Diagnoses: 300.02 (F41.1) Generalized Anxiety Disorder  Psychosocial / Contextual Factors: Individual Factors high anxiety associated with depressive sx, interfereing with ability to function as she would  like.  .  PROMIS (reviewed every 90 days):     Referral / Collaboration:  Was/were discussed and patient will pursue. - individual therapy    Anticipated number of session for this episode of care: 5-8 sessions  Anticipation frequency of session: Weekly  Anticipated Duration of each session: 38-52 minutes  Treatment plan will be reviewed in 90 days or when goals have been changed.       MeasurableTreatment Goal(s) related to diagnosis / functional impairment(s)  Goal 1: Patient will work with provider to manage symptoms    I will know I've met my goal when when less anxious and feeling down.      Objective #A (Patient Action)    Patient will   attend all sessions .  Status: Continued - Date(s): 9/11/24    Intervention(s)  Therapist will teach  educate patient on treatment options, clarify concerns, work with pt to overcome any resistance to compliance. .    Objective #B  Patient will identify 3 fears / thoughts that contribute to feeling anxious.  Status: Continued - Date(s): 9/11/24    Intervention(s)  Therapist will  educate patient on treatment options, clarify concerns, work with pt to overcome any resistance to compliance. .    Objective #C  Patient will identify 3 initial signs or symptoms of anxiety.  Status: Continued - Date(s): 9/11/24    Intervention(s)  Therapist will  educate patient on treatment options, clarify concerns, work with pt to overcome any resistance to compliance. .        Patient has reviewed and agreed to the above plan.      DINESH Whipple  December 16, 2024      Answers submitted by the patient for this visit:  Patient Health Questionnaire (Submitted on 12/2/2024)  If you checked off any problems, how difficult have these problems made it for you to do your work, take care of things at home, or get along with other people?: Somewhat difficult  PHQ9 TOTAL SCORE: 2  Patient Health Questionnaire (G7) (Submitted on 12/2/2024)  TIFFANY 7 TOTAL SCORE: 2

## 2024-12-11 ENCOUNTER — PATIENT OUTREACH (OUTPATIENT)
Dept: CARE COORDINATION | Facility: CLINIC | Age: 69
End: 2024-12-11
Payer: COMMERCIAL

## 2024-12-12 ENCOUNTER — OFFICE VISIT (OUTPATIENT)
Dept: FAMILY MEDICINE | Facility: CLINIC | Age: 69
End: 2024-12-12
Payer: COMMERCIAL

## 2024-12-12 VITALS
BODY MASS INDEX: 21.79 KG/M2 | HEART RATE: 81 BPM | WEIGHT: 143.8 LBS | TEMPERATURE: 97.7 F | DIASTOLIC BLOOD PRESSURE: 68 MMHG | SYSTOLIC BLOOD PRESSURE: 118 MMHG | OXYGEN SATURATION: 99 % | HEIGHT: 68 IN | RESPIRATION RATE: 20 BRPM

## 2024-12-12 DIAGNOSIS — F41.1 GAD (GENERALIZED ANXIETY DISORDER): ICD-10-CM

## 2024-12-12 DIAGNOSIS — Z12.31 VISIT FOR SCREENING MAMMOGRAM: ICD-10-CM

## 2024-12-12 DIAGNOSIS — F33.0 MAJOR DEPRESSIVE DISORDER, RECURRENT, MILD (H): Primary | ICD-10-CM

## 2024-12-12 DIAGNOSIS — F43.23 ADJUSTMENT DISORDER WITH MIXED ANXIETY AND DEPRESSED MOOD: ICD-10-CM

## 2024-12-12 RX ORDER — PROPRANOLOL HYDROCHLORIDE 10 MG/1
10 TABLET ORAL EVERY 8 HOURS PRN
Qty: 90 TABLET | Refills: 3 | Status: SHIPPED | OUTPATIENT
Start: 2024-12-12

## 2024-12-12 RX ORDER — VENLAFAXINE HYDROCHLORIDE 37.5 MG/1
37.5 CAPSULE, EXTENDED RELEASE ORAL DAILY
Qty: 90 CAPSULE | Refills: 1 | Status: SHIPPED | OUTPATIENT
Start: 2024-12-12

## 2024-12-12 RX ORDER — VENLAFAXINE HYDROCHLORIDE 75 MG/1
75 CAPSULE, EXTENDED RELEASE ORAL DAILY
Qty: 90 CAPSULE | Refills: 1 | Status: SHIPPED | OUTPATIENT
Start: 2024-12-12

## 2024-12-12 ASSESSMENT — ANXIETY QUESTIONNAIRES
1. FEELING NERVOUS, ANXIOUS, OR ON EDGE: NEARLY EVERY DAY
GAD7 TOTAL SCORE: 5
6. BECOMING EASILY ANNOYED OR IRRITABLE: NOT AT ALL
GAD7 TOTAL SCORE: 5
IF YOU CHECKED OFF ANY PROBLEMS ON THIS QUESTIONNAIRE, HOW DIFFICULT HAVE THESE PROBLEMS MADE IT FOR YOU TO DO YOUR WORK, TAKE CARE OF THINGS AT HOME, OR GET ALONG WITH OTHER PEOPLE: SOMEWHAT DIFFICULT
2. NOT BEING ABLE TO STOP OR CONTROL WORRYING: NOT AT ALL
7. FEELING AFRAID AS IF SOMETHING AWFUL MIGHT HAPPEN: MORE THAN HALF THE DAYS
3. WORRYING TOO MUCH ABOUT DIFFERENT THINGS: NOT AT ALL
5. BEING SO RESTLESS THAT IT IS HARD TO SIT STILL: NOT AT ALL

## 2024-12-12 ASSESSMENT — PATIENT HEALTH QUESTIONNAIRE - PHQ9
5. POOR APPETITE OR OVEREATING: NOT AT ALL
SUM OF ALL RESPONSES TO PHQ QUESTIONS 1-9: 2

## 2024-12-12 NOTE — PATIENT INSTRUCTIONS
[facility]  [address] [place] [address]  Office: [phone]   Fax:    [phone]       If you start to feel increased anxiety or panic feelings - take your propranolol and /or your lemon drop and do the square breathing pattern - or other rhythmic breathing pattern to help calm your body and your thoughts.     Return in about 1 week (around 12/19/2024) for depression, anxiety, w/ Dr. V for 40 min appt.    Future Appointments 12/12/2024 - 6/10/2025        Date Visit Type Length Department Provider     12/16/2024  2:00 PM [facility] RETURN 30 min [facility] Kiefer, Leah, LICSW              12/19/2024 10:40 AM OFFICE VISIT 40 min  FAMILY PRACTICE Helga Ibarra MD    Location Instructions:     [facility] is located at [address], [address] Free parking is available; access the lot by turning north from [place] [address] onto [address], then west onto [address].              1/2/2025 11:00 AM [facility] RETURN 30 min [facility] White, Nicole, LPCC    Location Instructions:     [facility] has 2 buildings on its campus. Please visit us at [address] [place] [address] and enter the building with the numbers [id number] on it.               1/2/2025 11:[facility] min [facility] Judy Pompa DO    Location Instructions:     [facility] has 2 buildings on its campus. Please visit us at [address] [place] [address] and enter the building with the numbers [id number] on it.               1/3/2025  1:00 PM [facility] RETURN [facility] min [facility] Kiefer, Leah, LICSW              2/6/2025 10:40 AM ANNUAL WELLNESS 40 min  FAMILY Helga Briceno MD    Location Instructions:     [facility] is located at [address], [address] Free parking is available; access the lot by turning  "[address] from [address] onto [address], then west onto [address].              3/4/2025 10:45 AM RETURN DERMATOLOGY 15 min [facility]MAT[facility] Ligia Carbajal PA-C    Location Instructions:     [address] [id number]                     Thank you so much or choosing [facility]  for your Health Care. It was a pleasure seeing you at your visit today! Please contact us with any questions or concerns you may have.                   Helga Ibarra MD                              To reach your [facility] care team after hours call:   [phone] press #2 \"to speak with your care team\".  This will get you to our clinic instead of routing to central [facility]  scheduling.     PLEASE NOTE OUR HOURS HAVE CHANGED secondary to COVID-19 coronavirus pandemic, as we are trying to minimize patient exposure to the virus,  which is now widespread in the [facility].  These hours may change with very little notice.  We apologize for any inconvenience.       Our current clinic hours are:          Monday- Thursday   7:00am - 6:00pm  in person.      Friday  7:00am- 5:00pm                       Saturday and Sunday : Closed to in person and virtual visits        We have telephone and virtual visit times available between    7:00am - 6pm on Monday-Friday as well.                                                Phone:  [phone]      Our pharmacy hours: Monday through Friday 8:00am to 5:00pm                        Saturday - 9:00 am to 12 noon       Sunday : Closed.              Phone:  [phone]              ###  Please note: at this time we are not accepting any walk-in visits. ###      There is also information available at our web site:  [email or web address]    If your provider ordered any lab tests and you do not receive the results within 10 business days, please call the clinic.    If you need a medication refill please contact your " pharmacy.  Please allow 3 business days for your refill to be completed.    Our clinic offers telephone visits and e visits.  Please ask one of your team members to explain more.      Use [facility] (secure email communication and access to your chart) to send your primary care provider a message or make an appointment. Ask someone on your Team how to sign up for [facility].

## 2024-12-12 NOTE — PROGRESS NOTES
[facility]  [address] [place] [id number]  Office: [phone]   Fax:    [phone]       Assessment & Plan :       ICD-10-CM    1. Major depressive disorder, recurrent, mild (H)- to moderate  F33.0 venlafaxine (EFFEXOR XR) 37.5 MG 24 hr capsule     propranolol (INDERAL) 10 MG tablet      2. TIFFANY (generalized anxiety disorder) - mild-moderate  F41.1 venlafaxine (EFFEXOR XR) 75 MG 24 hr capsule      3. Adjustment disorder with mixed anxiety and depressed mood- mild  at this time  F43.23       4. Visit for screening mammogram  Z12.31 MA Screening Bilateral w/ Ric        Will increase pt's venlafaxine ack up to 112.5mg daily - 1-75mg capsule together with 1-37.5mg capsule daily -from 75mg daily.  Pt wasn't comfortable increasing her dosage to 150mg daily     We'll increase her trazodone from 1/4 of a 50mg tablet back up to to 1/2 to 1 -50mg tablet nightly for sleep.     Continue propranolol LA 60mg daily for her benign tremor and overall anxiety level  and will add in propranolol 10mg IR tabs 1 tab every 8 hrs prn. Panic /severe anxiety attack .  She will continue the lemon drop candy prn anxiety as well.     Pt will check with her therapist, Erika on 12/16/2024 at their next appt to see if she would recommend another therapist at her current location so pt could maintain that location for her therapy as well as Dr. Enriqueta Pompa as her psychiatrist.  Keep 1/2/2025 appt with Dr. Pompa.      Try circular or square breathing pattern that we did together today for anxiety/panic relief.     Return in about 1 week (around 12/19/2024) for depression, anxiety, w/ Dr. SUE for 40 min appt. Per patient request.  She is aware this may be too soon to see improvement in mood after making medication changes above.     Future Appointments 12/12/2024 - 6/10/2025        Date Visit Type Length Department Provider     12/16/2024  2:00 PM [facility] RETURN 30 min [facility]               12/19/2024 10:40 AM OFFICE VISIT 40 min [facility] Helga Ibarra MD    Location Instructions:     [facility] is located at [address] [place] [address], along [address] Free parking is available; access the lot by turning north from [place] [address] onto [facility], then west onto [facility].              1[facility]20[facility] Mica George LPCC    Location Instructions:     [facility] has 2 buildings on its campus. Please visit us at [address], [place] and enter the building with the numbers [id number] on it.               1/2/2025 11:30 AM [facility] RETURN 30 min [facility] Judy Pompa DO    Location Instructions:     [facility] has 2 buildings on its campus. Please visit us at [address] [place] [address] and enter the building with the numbers [phone] on it.               1/3/2025  1:00 PM C RETURN 30 min [facility] Erika Francisco, LICSW              2/6/2025 10:40 AM ANNUAL WELLNESS 40 min  FAMILY PRACTICE Helga Ibarra MD    Location Instructions:     [facility] is located at [address] [place] [address], along [address] Free parking is available; access the lot by turning north from [place] [address] onto [facility], then west onto [facility].              3/4/2025 10:45 AM RETURN DERMATOLOGY 15 min  DERMATOLOGY Ligia Carbajal PA-C    Location Instructions:     [address] [place] [id number]                   Please, call our clinic or go to the ER immediately if signs or symptoms worsen or fail to improve as anticipated.     MEDICATIONS:   Orders Placed This Encounter   Medications    venlafaxine (EFFEXOR XR) 37.5 MG 24 hr capsule     Sig: Take 1 capsule (37.5 mg) by mouth daily. Together with  the 75mg capsule daily =112.5mg daily     Dispense:  90 capsule     Refill:   1    venlafaxine (EFFEXOR XR) 75 MG 24 hr capsule     Sig: Take 1 capsule (75 mg) by mouth daily. Together with the 37.5mg capsule daily =112.5mg daily     Dispense:  90 capsule     Refill:  1    propranolol (INDERAL) 10 MG tablet     Sig: Take 1 tablet (10 mg) by mouth every 8 hours as needed (panic or severe anxiety feelings).     Dispense:  90 tablet     Refill:  3          - Continue other medications without change  Regular exercise  See Patient Instructions  55 minutes spent by me on the date of the encounter doing chart review, history and exam, documentation, counseling,  and further activities per the note. 45 minutes on face to face time.        Helga Ibarra MD        Chanelle Tristan is a 69 year old, presenting for the following health issues:  Mental health follow up - not doing well the last 3 weeks.   and the following other medical problems:      1. Major depressive disorder, recurrent, mild (H)- to moderate    2. TIFFANY (generalized anxiety disorder) - mild-moderate    3. Adjustment disorder with mixed anxiety and depressed mood- mild  at this time    4. Visit for screening mammogram            12/12/2024    11:03 AM   Additional Questions   Roomed by karolina alejo   Accompanied by self     History of Present Illness       Mental Health Follow-up:  Patient presents to follow-up on Depression & Anxiety.Patient's depression since last visit has been:  Medium  The patient is not having other symptoms associated with depression.  Patient's anxiety since last visit has been:  Better  The patient is not having other symptoms associated with anxiety.  Any significant life events: No  Patient is feeling anxious or having panic attacks.  Patient has no concerns about alcohol or drug use.    Headaches:   Since the patient's last clinic visit, headaches are: no change  The patient is getting headaches:  Stress headaches daily  She is able to do normal daily activities when she has a migraine.  The  "patient is taking the following rescue/relief medications:  Tylenol   Patient states \"The relief is inconsistent\" from the rescue/relief medications.   The patient is taking the following medications to prevent migraines:  No medications to prevent migraines  In the past 4 weeks, the patient has gone to an Urgent Care or Emergency Room 0 times times due to headaches.    She eats 2-3 servings of fruits and vegetables daily.She consumes 0 sweetened beverage(s) daily.She exercises with enough effort to increase her heart rate 9 or less minutes per day.  She exercises with enough effort to increase her heart rate 6 days per week.   She is taking medications regularly.     Depression and Anxiety :   How are you doing with your depression since your last visit? Worsened feeling overwhelmed with Dieudonne coming up. Started to experience worsening in her symptoms about 3 weeks ago.   How are you doing with your anxiety since your last visit?  Worsened   Are you having other symptoms that might be associated with depression or anxiety? Yes  just in the last couple of weeks I felt down, I just take on more than I can handle.  Have you had a significant life event? Dieudonne is coming and I want everything perfect.   Do you have any concerns with your use of alcohol or other drugs? No  Sees Dr. Pompa - her psychiatrist in about 2 weeks.   Last saw her therapist - Erika Francisco , 2 weeks ago on 12/3/2024.    Erika is leaving her current practice and her last appointment will be on 12/16/2024. With pt.      Pt self-decreased her trazodone from 1/2 of a 50mg tablet to 1/4 of a 50mg to see if she didn't need it any more and has found that she does.      Her therapist suggested sucking on a lemon candy if she is feeling anxious and pt is doing that today. - says it helps her anxiety.     Pt is supposed to go to the [employer] to hear the [employer] choir and is nervous about being in a crowd of people tonight, but still wants to go. "     Her daughter , Malini , did Thanksgiving day and will be [employer] day dinner this year.  She face-times patient in the evenings.      Pt states that she wasn't entirely truthful on her phq9 and TIFFANY-7 questionnaires done in the last week prior to today.      Social History     Tobacco Use    Smoking status: Never     Passive exposure: Never    Smokeless tobacco: Never   Vaping Use    Vaping status: Never Used   Substance Use Topics    Alcohol use: Yes     Alcohol/week: 0.0 - 1.0 standard drinks of alcohol     Comment: 2 per month    Drug use: No     Comment: no herbal meds either         12/2/2024     5:16 PM 12/10/2024     5:27 PM 12/12/2024     1:09 PM   PHQ   PHQ-9 Total Score 2  2  12   Q9: Thoughts of better off dead/self-harm past 2 weeks Not at all  Not at all  Not at all       Patient-reported         10/14/2024    11:11 AM 12/2/2024     5:19 PM 12/12/2024     1:09 PM   TIFFANY-7 SCORE   Total Score 3 (minimal anxiety) 2 (minimal anxiety)    Total Score 3 2  5       Patient-reported         12/12/2024     1:09 PM   Last PHQ-9   1.  Little interest or pleasure in doing things 2   2.  Feeling down, depressed, or hopeless 2   3.  Trouble falling or staying asleep, or sleeping too much 3   4.  Feeling tired or having little energy 2   5.  Poor appetite or overeating 1   6.  Feeling bad about yourself 0   7.  Trouble concentrating 0   8.  Moving slowly or restless 2   Q9: Thoughts of better off dead/self-harm past 2 weeks 0   PHQ-9 Total Score 12   Difficulty at work, home, or with people Somewhat difficult         12/12/2024     1:09 PM   TIFFANY-7    1. Feeling nervous, anxious, or on edge 3   2. Not being able to stop or control worrying 0   3. Worrying too much about different things 0   4. Trouble relaxing 0   5. Being so restless that it is hard to sit still 0   6. Becoming easily annoyed or irritable 0   7. Feeling afraid, as if something awful might happen 2   TIFFANY-7 Total Score 5   If you checked any problems,  how difficult have they made it for you to do your work, take care of things at home, or get along with other people? Somewhat difficult         Patient Active Problem List   Diagnosis    Essential hypertension with goal blood pressure less than 140/90 - then Orthostatic hypotension- resolved off of lisinopril 2.5mg daily and doing daily 2.25 mile walks with her dog    Hidradenitis    Localized osteoarthritis of hand    Synovial cyst of popliteal space    DERMATOPHYTOSIS OF NAIL- toenails     Lipidosis    Osteopenia    Postmenopausal    NUMBNESS AND TINGLING OF FOOT - bilateral -mild     Numbness of feet- distal feet R>L     Stress incontinence, female - mild    Cough    Acute bronchitis- recurrent- ? related to mold in school building- pt has since retired and no further bronchitis    Rosacea    History of migraine headaches- assoc. with nausea/vomiting - resolved around menopause - were  premenstrual     Dupuytren's disease of palm - right 4th digit flexor tendon    Sun-damaged skin    Seasonal allergic rhinitis    Basal cell carcinoma of leg, right    Adjustment disorder with mixed anxiety and depressed mood- mild  at this time    Family history of celiac disease    TIFFANY (generalized anxiety disorder)    Insomnia, unspecified type    Chronic constipation    Raynaud's phenomenon without gangrene    Other secondary osteoarthritis of left knee    Primary localized osteoarthrosis of left lower leg    Hyperlipidemia LDL goal <130    Basal cell carcinoma    Colon polyps    Screening for cervical cancer    Adjustment insomnia - resolved as of 5/5/2023 - off of ambien with psychiatry's okay     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 - much improved - resolved as of 5/5/2023     Drug-induced parkinsonism (H)- Abilify - generic = ARIPiprazole( 5mg) - RESOLVED OFF ABILIFY     Mood disorder (H)    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  "and right lower leg - lower pretibial area     Bilateral lower extremity edema- trace pitting edema distal LE to mid pretibial areas Left > right    Tremor    Major depressive disorder, recurrent, mild (H)    Orthostatic hypotension- resolved off of lisinopril 2.5mg daily and doing daily 2.25 mile walks with her dog       Current Outpatient Medications   Medication Sig Dispense Refill    clindamycin (CLEOCIN T) 1 % external lotion Apply topically 2 times daily 60 mL 3    lactobacillus rhamnosus, GG, (CULTURELL) capsule Take 1 capsule by mouth daily Taking every other day      propranolol (INDERAL) 10 MG tablet Take 1 tablet (10 mg) by mouth every 8 hours as needed (panic or severe anxiety feelings). 90 tablet 3    propranolol ER (INDERAL LA) 60 MG 24 hr capsule Take 1 capsule (60 mg) by mouth daily 90 capsule 3    traZODone (DESYREL) 50 MG tablet Take 0.5-1 tablets (25-50 mg) by mouth nightly as needed for sleep. 90 tablet 1    venlafaxine (EFFEXOR XR) 37.5 MG 24 hr capsule Take 1 capsule (37.5 mg) by mouth daily. Together with  the 75mg capsule daily =112.5mg daily 90 capsule 1    venlafaxine (EFFEXOR XR) 75 MG 24 hr capsule Take 1 capsule (75 mg) by mouth daily. Together with the 37.5mg capsule daily =112.5mg daily 90 capsule 1          Allergies   Allergen Reactions    Abilify [Aripiprazole] Other (See Comments)     Suspected drug-induced Parkinsonism     Fosamax Diarrhea     Diarrhea, stomach cramps, jaw pain     Amoxicillin-Pot Clavulanate Itching and Rash    Amoxicillin-Pot Clavulanate Itching and Rash              Review of Systems  Constitutional, HEENT, cardiovascular, pulmonary, GI, , musculoskeletal, neuro, skin, endocrine and psych systems are negative, except as otherwise noted.      Objective    /68   Pulse 81   Temp 97.7  F (36.5  C) (Tympanic)   Resp 20   Ht 1.726 m (5' 7.95\")   Wt 65.2 kg (143 lb 12.8 oz)   LMP 06/28/2001   SpO2 99%   BMI 21.89 kg/m    Body mass index is 21.89 " kg/m .  Physical Exam   GENERAL: alert and no distress  EYES: Eyes grossly normal to inspection, conjunctivae and sclerae normal  HENT:  nose and mouth without visible ulcers or lesions  NECK:   no visible asymmetry, masses, or scars  RESP: lungs audibly to auscultation - no rales, rhonchi or wheezes  CV:  no peripheral edema  MS: no gross musculoskeletal defects noted, no edema  NEURO: Normal strength and tone, mentation intact and speech normal  PSYCH: Alert and oriented. No acute distress. Appears well-groomed and casually dressed. Affect is a little blunted and depressed over her baseline, but not as depressed as she was 2.5 years ago when her depression was quite severe. Speech and responses are a bit slower than usual.  .Overall appears slightly depressed, but on and off is In good humor and laughs appropriately. Mildly  anxious. No evidence of psychosis.     Office Visit on 02/05/2024   Component Date Value Ref Range Status    Creatinine Urine mg/dL 02/05/2024 52.4  mg/dL Final    The reference ranges have not been established in urine creatinine. The results should be integrated into the clinical context for interpretation.    Albumin Urine mg/L 02/05/2024 <12.0  mg/L Final    The reference ranges have not been established in urine albumin. The results should be integrated into the clinical context for interpretation.    Albumin Urine mg/g Cr 02/05/2024    Final    Unable to calculate, urine albumin and/or urine creatinine is outside detectable limits.  Microalbuminuria is defined as an albumin:creatinine ratio of 17 to 299 for males and 25 to 299 for females. A ratio of albumin:creatinine of 300 or higher is indicative of overt proteinuria.  Due to biologic variability, positive results should be confirmed by a second, first-morning random or 24-hour timed urine specimen. If there is discrepancy, a third specimen is recommended. When 2 out of 3 results are in the microalbuminuria range, this is evidence for  incipient nephropathy and warrants increased efforts at glucose control, blood pressure control, and institution of therapy with an angiotensin-converting-enzyme (ACE) inhibitor (if the patient can tolerate it).      TSH 02/05/2024 1.26  0.30 - 4.20 uIU/mL Final    Cholesterol 02/05/2024 203 (H)  <200 mg/dL Final    Triglycerides 02/05/2024 67  <150 mg/dL Final    Direct Measure HDL 02/05/2024 74  >=50 mg/dL Final    LDL Cholesterol Calculated 02/05/2024 116 (H)  <=100 mg/dL Final    Non HDL Cholesterol 02/05/2024 129  <130 mg/dL Final    Patient Fasting > 8hrs? 02/05/2024 Yes   Final    Sodium 02/05/2024 138  135 - 145 mmol/L Final    Reference intervals for this test were updated on 09/26/2023 to more accurately reflect our healthy population. There may be differences in the flagging of prior results with similar values performed with this method. Interpretation of those prior results can be made in the context of the updated reference intervals.     Potassium 02/05/2024 4.7  3.4 - 5.3 mmol/L Final    Carbon Dioxide (CO2) 02/05/2024 27  22 - 29 mmol/L Final    Anion Gap 02/05/2024 9  7 - 15 mmol/L Final    Urea Nitrogen 02/05/2024 17.4  8.0 - 23.0 mg/dL Final    Creatinine 02/05/2024 0.68  0.51 - 0.95 mg/dL Final    GFR Estimate 02/05/2024 >90  >60 mL/min/1.73m2 Final    Calcium 02/05/2024 9.9  8.8 - 10.2 mg/dL Final    Chloride 02/05/2024 102  98 - 107 mmol/L Final    Glucose 02/05/2024 92  70 - 99 mg/dL Final    Alkaline Phosphatase 02/05/2024 75  40 - 150 U/L Final    Reference intervals for this test were updated on 11/14/2023 to more accurately reflect our healthy population. There may be differences in the flagging of prior results with similar values performed with this method. Interpretation of those prior results can be made in the context of the updated reference intervals.    AST 02/05/2024 19  0 - 45 U/L Final    Reference intervals for this test were updated on 6/12/2023 to more accurately reflect our  healthy population. There may be differences in the flagging of prior results with similar values performed with this method. Interpretation of those prior results can be made in the context of the updated reference intervals.    ALT 02/05/2024 16  0 - 50 U/L Final    Reference intervals for this test were updated on 6/12/2023 to more accurately reflect our healthy population. There may be differences in the flagging of prior results with similar values performed with this method. Interpretation of those prior results can be made in the context of the updated reference intervals.      Protein Total 02/05/2024 7.2  6.4 - 8.3 g/dL Final    Albumin 02/05/2024 4.4  3.5 - 5.2 g/dL Final    Bilirubin Total 02/05/2024 0.7  <=1.2 mg/dL Final    WBC Count 02/05/2024 8.7  4.0 - 11.0 10e3/uL Final    RBC Count 02/05/2024 4.44  3.80 - 5.20 10e6/uL Final    Hemoglobin 02/05/2024 13.1  11.7 - 15.7 g/dL Final    Hematocrit 02/05/2024 40.8  35.0 - 47.0 % Final    MCV 02/05/2024 92  78 - 100 fL Final    MCH 02/05/2024 29.5  26.5 - 33.0 pg Final    MCHC 02/05/2024 32.1  31.5 - 36.5 g/dL Final    RDW 02/05/2024 13.1  10.0 - 15.0 % Final    Platelet Count 02/05/2024 233  150 - 450 10e3/uL Final    25 [place] Vitamin D2 02/05/2024 <5  ug/L Final    25 OH Vitamin D3 02/05/2024 32  ug/L Final    25 [place] Vit D Total 02/05/2024 <37  20 - 75 ug/L Final    Season, race, dietary intake, and treatment affect the concentration of 25-hydroxy-Vitamin D. Values may decrease during winter months and increase during summer months. Values 20-29 ug/L may indicate Vitamin D insufficiency and values <20 ug/L may indicate Vitamin D deficiency.           Signed Electronically by: Helga Ibarra MD

## 2024-12-16 ENCOUNTER — VIRTUAL VISIT (OUTPATIENT)
Dept: BEHAVIORAL HEALTH | Facility: CLINIC | Age: 69
End: 2024-12-16
Payer: COMMERCIAL

## 2024-12-16 DIAGNOSIS — G47.00 INSOMNIA, UNSPECIFIED TYPE: ICD-10-CM

## 2024-12-16 DIAGNOSIS — F41.1 GENERALIZED ANXIETY DISORDER: Primary | ICD-10-CM

## 2024-12-16 PROCEDURE — 90834 PSYTX W PT 45 MINUTES: CPT | Mod: 95 | Performed by: SOCIAL WORKER

## 2024-12-17 ASSESSMENT — ANXIETY QUESTIONNAIRES
7. FEELING AFRAID AS IF SOMETHING AWFUL MIGHT HAPPEN: SEVERAL DAYS
5. BEING SO RESTLESS THAT IT IS HARD TO SIT STILL: NOT AT ALL
GAD7 TOTAL SCORE: 8
4. TROUBLE RELAXING: SEVERAL DAYS
GAD7 TOTAL SCORE: 8
1. FEELING NERVOUS, ANXIOUS, OR ON EDGE: NEARLY EVERY DAY
8. IF YOU CHECKED OFF ANY PROBLEMS, HOW DIFFICULT HAVE THESE MADE IT FOR YOU TO DO YOUR WORK, TAKE CARE OF THINGS AT HOME, OR GET ALONG WITH OTHER PEOPLE?: VERY DIFFICULT
GAD7 TOTAL SCORE: 8
7. FEELING AFRAID AS IF SOMETHING AWFUL MIGHT HAPPEN: SEVERAL DAYS
6. BECOMING EASILY ANNOYED OR IRRITABLE: NOT AT ALL
3. WORRYING TOO MUCH ABOUT DIFFERENT THINGS: MORE THAN HALF THE DAYS
2. NOT BEING ABLE TO STOP OR CONTROL WORRYING: SEVERAL DAYS
IF YOU CHECKED OFF ANY PROBLEMS ON THIS QUESTIONNAIRE, HOW DIFFICULT HAVE THESE PROBLEMS MADE IT FOR YOU TO DO YOUR WORK, TAKE CARE OF THINGS AT HOME, OR GET ALONG WITH OTHER PEOPLE: VERY DIFFICULT

## 2024-12-17 ASSESSMENT — PATIENT HEALTH QUESTIONNAIRE - PHQ9
SUM OF ALL RESPONSES TO PHQ QUESTIONS 1-9: 10
10. IF YOU CHECKED OFF ANY PROBLEMS, HOW DIFFICULT HAVE THESE PROBLEMS MADE IT FOR YOU TO DO YOUR WORK, TAKE CARE OF THINGS AT HOME, OR GET ALONG WITH OTHER PEOPLE: VERY DIFFICULT
SUM OF ALL RESPONSES TO PHQ QUESTIONS 1-9: 10

## 2024-12-17 NOTE — PROGRESS NOTES
[facility]  December 30, 2024      Behavioral Health Clinician Progress Note    Patient Name: Jolynn Haji           Service Type:  Individual      Service Location:   MyChart / Email (patient reached)     Session Start Time: 1:30 pm   Session End Time: 2:00 pm      Session Length: 16 - 37      Attendees: Patient     Service Modality:  Video Visit:      Provider verified identity through the following two step process.  Patient provided:  Patient is known previously to provider    Telemedicine Visit: The patient's condition can be safely assessed and treated via synchronous audio and visual telemedicine encounter.      Reason for Telemedicine Visit: Patient has requested telehealth visit    Originating Site (Patient Location): Patient's home    Distant Site (Provider Location): [facility]    Consent:  The patient/guardian has verbally consented to: the potential risks and benefits of telemedicine (video visit) versus in person care; bill my insurance or make self-payment for services provided; and responsibility for payment of non-covered services.     Patient would like the video invitation sent by:  My Chart    Mode of Communication:  Video Conference via [employer]    As the provider I attest to compliance with applicable laws and regulations related to telemedicine.    Visit Activities (Refresh list every visit): [facility] [place] Only    Diagnostic Assessment Date: 2/28/24 (updated)  Treatment Plan Review Date: 3/30/25  See Flowsheets for today's PHQ-9 and TIFFANY-7 results  Previous PHQ-9:       12/2/2024     5:16 PM 12/10/2024     5:27 PM 12/12/2024     1:09 PM   PHQ-9 SCORE   PHQ-9 Total Score MyChart 2 (Minimal depression) 2 (Minimal depression)    PHQ-9 Total Score 2  2  12       Patient-reported     Previous TIFFANY-7:       10/14/2024    11:11 AM 12/2/2024     5:19 PM 12/12/2024     1:09 PM   TIFFANY-7 SCORE   Total Score 3 (minimal anxiety)  "2 (minimal anxiety)    Total Score 3 2  5       Patient-reported       KARINA LEVEL:      8/27/2010     3:00 PM 2/22/2011    11:00 AM   KARINA Score (Last Two)   KARINA Raw Score 51 49   Activation Score 91.6 82.8   KARINA Level 4 4       DATA  Extended Session (60+ minutes): No  Interactive Complexity: No  Crisis: No  [facility] Patient: No    Treatment Objective(s) Addressed in This Session:  Target Behavior(s):  anxiety and depression    Depressed Mood: Increase interest, engagement, and pleasure in doing things  Decrease frequency and intensity of feeling down, depressed, hopeless  Improve quantity and quality of night time sleep / decrease daytime naps  Feel less tired and more energy during the day   Improve diet, appetite, mindful eating, and / or meal planning  Identify negative self-talk and behaviors: challenge core beliefs, myths, and actions  Improve concentration, focus, and mindfulness in daily activities   Feel less fidgety, restless or slow in daily activities / interpersonal interactions  Anxiety: will experience a reduction in anxiety, will develop more effective coping skills to manage anxiety symptoms, will develop healthy cognitive patterns and beliefs and will increase ability to function adaptively    Current Stressors / Issues:  Reports making appt for new therapist but appt is really far out. Pt is very anxious about this but provider will look into more closely. Has been sleeping ok which has been of concern to her. Was able to enjoy the holiday despite higher anxiety. Having a bit of a \"let down.\" Following the holidays. Discussed attending to her skills and tech for her anxiety.      12/16  Report having more a much more difficult time with her mental health since last visit. Anxiety was very high, sleep was worse and had a difficult time eating. She also was struggling with isolation. Reports she went to PCP to get look at meds and they increased her dose. Feeling some improvement with eating, better " sleep, less heart palpitations and reduced headaches. Continues to work on voicing needs with friends and family.  She has been very open with family and friends about her current struggles. Discussed provider leaving [facility]. Made referral for [facility] today for long term therapy.     12/3  Reports Thanksgiving was really good. Discussed triggers for anxiety, one being the [place] holiday approaching. Discussed a learning opportunity of not doing certain things to better her overall wellbeing. This is uncomfortable but discussed how the discomfort will pass. Pt got an acupuncture mat from [facility][note 3] and it has been really calming and helps with relaxing. Reports this helps elevate headaches as well for a little bit. Discussed meeting with Dr. Pompa regarding medications. Going to revisit possible medication changes in January. Discussed increased  fatigue despite having a good nights sleep. Discussed increasing exercise as she has decreased significantly with changes in weather. Pt reports on progress with letting others know when she feels activities are too much for her at that time. This is becoming more natural.            Progress on Treatment Objective(s) / Homework:  Satisfactory progress - ACTION (Actively working towards change); Intervened by reinforcing change plan / affirming steps taken    Motivational Interviewing    MI Intervention: Expressed Empathy/Understanding, Supported Autonomy, Collaboration, Evocation, Open-ended questions, Change talk (evoked) and Reframe     Change Talk Expressed by the Patient: Desire to change Ability to change Reasons to change Need to change    Provider Response to Change Talk: E - Evoked more info from patient about behavior change, A - Affirmed patient's thoughts, decisions, or attempts at behavior change, R - Reflected patient's change talk and S - Summarized patient's change talk statements      Care Plan review completed: Yes    Medication Review:  No changes to current  psychiatric medication(s)    Medication Compliance:  Yes    Changes in Health Issues:   None reported    Chemical Use Review:   Substance Use: Chemical use reviewed, no active concerns identified      Tobacco Use: No current tobacco use.      Assessment: Current Emotional / Mental Status (status of significant symptoms):  Risk status (Self / Other harm or suicidal ideation)  Patient denies a history of suicidal ideation, suicide attempts, self-injurious behavior, homicidal ideation, homicidal behavior and and other safety concerns  Patient denies current fears or concerns for personal safety.  Patient denies current or recent suicidal ideation or behaviors.  Patient denies current or recent homicidal ideation or behaviors.  Patient denies current or recent self injurious behavior or ideation.  Patient denies other safety concerns.  A safety and risk management plan has not been developed at this time, however patient was encouraged to call [place] [phone] should there be a change in any of these risk factors.    Appearance:   Appropriate   Eye Contact:   Good   Psychomotor Behavior: Normal   Attitude:   Cooperative   Orientation:   All  Speech   Rate / Production: Normal    Volume:  Normal   Mood:    Anxious   Affect:    Appropriate   Thought Content:  Clear   Thought Form:  Coherent  Logical   Insight:    Good     Diagnoses:  1. Insomnia, unspecified type    2. Generalized anxiety disorder    3. Mood disorder (H)        Collateral Reports Completed:  Not Applicable    Plan: (Homework, other):  Patient was given information about behavioral services and encouraged to schedule a follow up appointment with the clinic [facility] in 1 week.  She was also given information about mental health symptoms and treatment options .  CD Recommendations: No indications of CD issues.   Erika Francisco MSEssentia HealthSW      ______________________________________________________________________    Integrated Primary Care Behavioral Health  Treatment Plan    Patient's Name: Jolynn Haji  YOB: 1955    Date of Creation: 10/5/22  Date Treatment Plan Last Reviewed/Revised: 12/30/24    DSM5 Diagnoses: 300.02 (F41.1) Generalized Anxiety Disorder  Psychosocial / Contextual Factors: Individual Factors high anxiety associated with depressive sx, interfereing with ability to function as she would like.  .  PROMIS (reviewed every 90 days):     Referral / Collaboration:  Was/were discussed and patient will pursue. - individual therapy    Anticipated number of session for this episode of care: 5-8 sessions  Anticipation frequency of session: Weekly  Anticipated Duration of each session: 38-52 minutes  Treatment plan will be reviewed in 90 days or when goals have been changed.       MeasurableTreatment Goal(s) related to diagnosis / functional impairment(s)  Goal 1: Patient will work with provider to manage symptoms    I will know I've met my goal when when less anxious and feeling down.      Objective #A (Patient Action)    Patient will   attend all sessions .  Status: Continued - Date(s): 12/30/24    Intervention(s)  Therapist will teach  educate patient on treatment options, clarify concerns, work with pt to overcome any resistance to compliance. .    Objective #B  Patient will identify 3 fears / thoughts that contribute to feeling anxious.  Status: Continued - Date(s): 12/30/24    Intervention(s)  Therapist will  educate patient on treatment options, clarify concerns, work with pt to overcome any resistance to compliance. .    Objective #C  Patient will identify 3 initial signs or symptoms of anxiety.  Status: Continued - Date(s): 12/30/24    Intervention(s)  Therapist will  educate patient on treatment options, clarify concerns, work with pt to overcome any resistance to compliance. .        Patient has reviewed and agreed to the above plan.      Erika Francisco [facility]  December 30, 2024      Answers submitted by the patient for this visit:  Patient  Health Questionnaire (Submitted on 12/2/2024)  If you checked off any problems, how difficult have these problems made it for you to do your work, take care of things at home, or get along with other people?: Somewhat difficult  PHQ9 TOTAL SCORE: 2  Patient Health Questionnaire (G7) (Submitted on 12/2/2024)  TIFFANY 7 TOTAL SCORE: 2

## 2024-12-19 ENCOUNTER — TELEPHONE (OUTPATIENT)
Dept: FAMILY MEDICINE | Facility: CLINIC | Age: 69
End: 2024-12-19

## 2024-12-19 ENCOUNTER — OFFICE VISIT (OUTPATIENT)
Dept: FAMILY MEDICINE | Facility: CLINIC | Age: 69
End: 2024-12-19
Payer: COMMERCIAL

## 2024-12-19 VITALS
TEMPERATURE: 97.1 F | HEART RATE: 93 BPM | HEIGHT: 67 IN | WEIGHT: 140 LBS | DIASTOLIC BLOOD PRESSURE: 86 MMHG | RESPIRATION RATE: 16 BRPM | SYSTOLIC BLOOD PRESSURE: 120 MMHG | BODY MASS INDEX: 21.97 KG/M2 | OXYGEN SATURATION: 99 %

## 2024-12-19 DIAGNOSIS — F43.23 ADJUSTMENT DISORDER WITH MIXED ANXIETY AND DEPRESSED MOOD: ICD-10-CM

## 2024-12-19 DIAGNOSIS — F41.9 ANXIETY: ICD-10-CM

## 2024-12-19 DIAGNOSIS — F51.02 ADJUSTMENT INSOMNIA: ICD-10-CM

## 2024-12-19 DIAGNOSIS — F33.1 MODERATE RECURRENT MAJOR DEPRESSION (H): Primary | ICD-10-CM

## 2024-12-19 PROBLEM — E08.49 DIABETES DUE TO UNDRL CONDITION W OTH DIABETIC NEURO COMP (H): Status: ACTIVE | Noted: 2024-12-19

## 2024-12-19 NOTE — TELEPHONE ENCOUNTER
Dear Dr. Pompa,     Our mutual patient, Jolynn Haji (Cindy), has had some increase in her anxiety and depression symptoms the last several weeks.  We've increased her venlafaxine XR from 75mg to 112.5mg last week per pt request.  She is seeing you for follow up on 1/2/2025, but her last visit with her highly effective therapist, Erika Francisco is on 12/30/2025 and pt is having increased anxiety re: obtaining a new therapist.      The patient is feeling overwhelmed with the thought of finding a new therapist and is wondering if you would make some recommendations within and/or outside the [facility] [employer]  system for a good fit for her.   She is open to both in person and on-line counseling/therapy.       I have placed a new referral for her for individual counseling and therapy and suggested also the psychologytoday.com website to aid in her search, but she voiced significant feelings of overwhelm with that suggestion.      Thank you so much for your care.     Sincerely,  Helga Ibarra MD

## 2024-12-19 NOTE — PROGRESS NOTES
Assessment & Plan :       ICD-10-CM    1. Moderate recurrent major depression (H)  F33.1 REVIEW OF HEALTH MAINTENANCE PROTOCOL ORDERS     Adult Mental Health  Referral      2. Adjustment disorder with mixed anxiety and depressed mood- mild  at this time  F43.23 Adult Mental Health  Referral      3. Adjustment insomnia - resolved as of 5/5/2023 - off of ambien with psychiatry's okay  F51.02 Adult Mental Health  Referral      4. Anxiety  F41.9 Adult Mental Health  Referral        Sent a telephone encounter to Dr. Pompa re: pt's increased symptoms and anxiety with finding a new therapist.  See that encounter from today.       Return in about 4 weeks (around 1/16/2025) for depression, anxiety, w/ Dr. SUE for 40 min appt, in person. Pt will keep appts with MsObdulia Francisco and Dr. Pompa below.     Pt will prioritize her sleep for a better mood for the following day.      Future Appointments 12/19/2024 - 6/17/2025        Date Visit Type Length Department Provider     12/30/2024  1:30 PM C RETURN 30 min [facility] Erika Francisco, JOESPHSW              [facility]/2025 [facility] Mica George LPCC    Location Instructions:     [facility] has 2 buildings on its campus. Please visit us at [address] [place] [address] and enter the building with the numbers [id number] on it.               1/2/2025 11:30 AM [facility] ADULT PSYCHIATRY RETURN 30 min [facility] Judy Pompa DO    Location Instructions:     [facility] has 2 buildings on its campus. Please visit us at [address] [place] [address] and enter the building with the numbers [id number] on it.               1/9/2025 10:40 AM OFFICE VISIT 20 min  FAMILY PRACTICE Helga Ibarra MD    Location Instructions:     [facility] is located at [address] [place] [address], along [address] Free parking is available; access the lot by  turning north from [facility] [address]  onto [facility], then west onto [facility] [facility].              2/6/2025 10:40 AM ANNUAL WELLNESS 40 min  FAMILY PRACTICE Helga Ibarra MD    Location Instructions:     [facility] is located at [address] [facility] [address], along [address] Free parking is available; access the lot by turning north from [place] [address] onto [facility], then west onto [address] [facility].              2/27/2025  1:30 PM MA SCREENING BILATERAL W/ AVERY 15 min  BREAST CENTER [email or web address]    Location Instructions:     [facility] Building [address], [address] [place] [address]  Parking Breast Center customers can park in the lot adjacent to the entrance to the [facility].  Entrance and check-in location Enter at the front entrance, under the canopy. Take the stairs or elevator from the main entrance to the 2nd floor. Please check-in for your appointment at the desk in the Breast Center waiting area.  This appointment is in a hospital-based location.&nbsp; Before your visit, you may want to check with your insurance company for coverage and referral options, including cost differences between services provided in different clinic settings.&nbsp; For more information visit this link on the [employer] [place] Website:&nbsp; [email or web address]              3/4/2025 10:45 AM RETURN DERMATOLOGY 15 min [facility] Ligia Carbajal PA-C    Location Instructions:     [address] [id number]                      MEDICATIONS:   No orders of the defined types were placed in this encounter.         - Continue other medications without change  Regular exercise  See Patient Instructions       Helga Ibarra MD      Chanelle Tristan is a 69 year old, presenting for the following health issues:  Recheck Medication  and the following other medical  "problems:      1. Moderate recurrent major depression (H)    2. Adjustment disorder with mixed anxiety and depressed mood- mild  at this time    3. Adjustment insomnia - resolved as of 5/5/2023 - off of ambien with psychiatry's okay    4. Anxiety            12/19/2024    10:17 AM   Additional Questions   Roomed by Orquidea COYNE   Accompanied by self     HPI       Depression and Anxiety - night before last - did not sleep well at all and yesterday was not a good day , mood-wise.   Felt more flat and her  noticed too.  Depression feelings were a little worse as was her anxiety yesterday.    How are you doing with your depression since your last visit? Improved from yesterday  How are you doing with your anxiety since your last visit?  Improved from yesterday  Are you having other symptoms that might be associated with depression or anxiety? No  Have you had a significant life event? No   Do you have any concerns with your use of alcohol or other drugs? No    On 12/12/2024 - we increased her effexor xr from 75mg daily to 112.5mg per patient request as she felt her depression and anxiety were worsening without provocative or palliative factors and her affect was visibly worsened at that time .   No nausea/vomiting or diarrhea. . No headaches like she had last time on dose > 75mg of venlafaxine.   Sucking on lemon drops for anxiety per her therapists recommendation  and focusing on anxiety reducing techniques.  - breathing techniques and doing the CALM lorena - recognizing the early signs and taking time out for herself to mitigate those on her own.     Her therapist Erika Francisco is leaving [facility]  and pt is \"very anxious\" - voiced that specifically - about finding a new therapist.    She called into [facility] scheduling and the earliest she can get in for therapy with a different provider is 3/2025, which is not acceptable for this patient's psych/therapy needs.         Social History     Tobacco Use    " Smoking status: Never     Passive exposure: Never    Smokeless tobacco: Never   Vaping Use    Vaping status: Never Used   Substance Use Topics    Alcohol use: Yes     Alcohol/week: 0.0 - 1.0 standard drinks of alcohol     Comment: 2 per month    Drug use: No     Comment: no herbal meds either         12/10/2024     5:27 PM 12/12/2024     1:09 PM 12/17/2024     2:23 PM   PHQ   PHQ-9 Total Score 2  12 10    Q9: Thoughts of better off dead/self-harm past 2 weeks Not at all Not at all Not at all       Patient-reported         12/2/2024     5:19 PM 12/12/2024     1:09 PM 12/17/2024     2:25 PM   TIFFANY-7 SCORE   Total Score 2 (minimal anxiety)  8 (mild anxiety)   Total Score 2  5 8        Patient-reported         12/17/2024     2:23 PM   Last PHQ-9   1.  Little interest or pleasure in doing things 2   2.  Feeling down, depressed, or hopeless 2   3.  Trouble falling or staying asleep, or sleeping too much 0   4.  Feeling tired or having little energy 2   5.  Poor appetite or overeating 2   6.  Feeling bad about yourself 1   7.  Trouble concentrating 1   8.  Moving slowly or restless 0   Q9: Thoughts of better off dead/self-harm past 2 weeks 0   PHQ-9 Total Score 10        Patient-reported         12/17/2024     2:25 PM   TIFFANY-7    1. Feeling nervous, anxious, or on edge 3   2. Not being able to stop or control worrying 1   3. Worrying too much about different things 2   4. Trouble relaxing 1   5. Being so restless that it is hard to sit still 0   6. Becoming easily annoyed or irritable 0   7. Feeling afraid, as if something awful might happen 1   TIFFANY-7 Total Score 8    If you checked any problems, how difficult have they made it for you to do your work, take care of things at home, or get along with other people? Very difficult       Patient-reported       Suicide Assessment Five-step Evaluation and Treatment (SAFE-T)      Patient Active Problem List   Diagnosis    Essential hypertension with goal blood pressure less than  140/90 - then Orthostatic hypotension- resolved off of lisinopril 2.5mg daily and doing daily 2.25 mile walks with her dog    Hidradenitis    Localized osteoarthritis of hand    Synovial cyst of popliteal space    DERMATOPHYTOSIS OF NAIL- toenails     Lipidosis    Osteopenia    Postmenopausal    NUMBNESS AND TINGLING OF FOOT - bilateral -mild     Numbness of feet- distal feet R>L     Stress incontinence, female - mild    Cough    Acute bronchitis- recurrent- ? related to mold in school building- pt has since retired and no further bronchitis    Rosacea    History of migraine headaches- assoc. with nausea/vomiting - resolved around menopause - were  premenstrual     Dupuytren's disease of palm - right 4th digit flexor tendon    Sun-damaged skin    Seasonal allergic rhinitis    Basal cell carcinoma of leg, right    Adjustment disorder with mixed anxiety and depressed mood- mild  at this time    Family history of celiac disease    TIFFANY (generalized anxiety disorder)    Insomnia, unspecified type    Chronic constipation    Raynaud's phenomenon without gangrene    Other secondary osteoarthritis of left knee    Primary localized osteoarthrosis of left lower leg    Hyperlipidemia LDL goal <130    Basal cell carcinoma    Colon polyps    Screening for cervical cancer    Adjustment insomnia - resolved as of 5/5/2023 - off of ambien with psychiatry's okay     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 - much improved - resolved as of 5/5/2023     Drug-induced parkinsonism (H)- Abilify - generic = ARIPiprazole( 5mg) - RESOLVED OFF ABILIFY     Mood disorder (H)    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 - lower pretibial area     Bilateral lower extremity edema- trace pitting edema distal LE to mid pretibial areas Left > right    Tremor    Major depressive disorder, recurrent, mild (H)    Orthostatic hypotension- resolved off  "of lisinopril 2.5mg daily and doing daily 2.25 mile walks with her dog    Moderate recurrent major depression (H)       Current Outpatient Medications   Medication Sig Dispense Refill    lactobacillus rhamnosus, GG, (CULTURELL) capsule Take 1 capsule by mouth daily Taking every other day      propranolol (INDERAL) 10 MG tablet Take 1 tablet (10 mg) by mouth every 8 hours as needed (panic or severe anxiety feelings). 90 tablet 3    propranolol ER (INDERAL LA) 60 MG 24 hr capsule Take 1 capsule (60 mg) by mouth daily 90 capsule 3    traZODone (DESYREL) 50 MG tablet Take 0.5-1 tablets (25-50 mg) by mouth nightly as needed for sleep. 90 tablet 1    venlafaxine (EFFEXOR XR) 37.5 MG 24 hr capsule Take 1 capsule (37.5 mg) by mouth daily. Together with  the 75mg capsule daily =112.5mg daily 90 capsule 1    venlafaxine (EFFEXOR XR) 75 MG 24 hr capsule Take 1 capsule (75 mg) by mouth daily. Together with the 37.5mg capsule daily =112.5mg daily 90 capsule 1    clindamycin (CLEOCIN T) 1 % external lotion Apply topically 2 times daily 60 mL 3          Allergies   Allergen Reactions    Abilify [Aripiprazole] Other (See Comments)     Suspected drug-induced Parkinsonism     Fosamax Diarrhea     Diarrhea, stomach cramps, jaw pain     Amoxicillin-Pot Clavulanate Itching and Rash    Amoxicillin-Pot Clavulanate Itching and Rash              Review of Systems  Constitutional, HEENT, cardiovascular, pulmonary, GI, , musculoskeletal, neuro, skin, endocrine and psych systems are negative, except as otherwise noted.      Objective    /86   Pulse 93   Temp 97.1  F (36.2  C) (Tympanic)   Resp 16   Ht 1.702 m (5' 7\")   Wt 63.5 kg (140 lb)   LMP 06/28/2001   SpO2 99%   BMI 21.93 kg/m    Body mass index is 21.93 kg/m .  Physical Exam   GENERAL: alert and no distress  EYES: Eyes grossly normal to inspection, PERRL and conjunctivae and sclerae normal  HENT: ear canals and TM's normal, nose and mouth without ulcers or lesions  NECK: " no adenopathy, no asymmetry, masses, or scars  RESP: lungs clear to auscultation - no rales, rhonchi or wheezes  CV: regular rate and rhythm, normal S1 S2, no S3 or S4, no murmur, click or rub, no peripheral edema  ABDOMEN: soft, nontender, no hepatosplenomegaly, no masses and bowel sounds normal  MS: no gross musculoskeletal defects noted, no edema  SKIN: no suspicious lesions or rashes  NEURO: Normal strength and tone, mentation intact and speech normal  PSYCH: Alert and oriented. No acute distress. Appears well-groomed and casually dressed. Affect is much brighter this week compared with last week, only mildly   depressed today.  Less flat affect and  In better/good humor today and laughs more appropriately and frequently today - closer to her baseline affect which is smiley and positive . modearately anxious. No evidence of psychosis.     Office Visit on 02/05/2024   Component Date Value Ref Range Status    Creatinine Urine mg/dL 02/05/2024 52.4  mg/dL Final    The reference ranges have not been established in urine creatinine. The results should be integrated into the clinical context for interpretation.    Albumin Urine mg/L 02/05/2024 <12.0  mg/L Final    The reference ranges have not been established in urine albumin. The results should be integrated into the clinical context for interpretation.    Albumin Urine mg/g Cr 02/05/2024    Final    Unable to calculate, urine albumin and/or urine creatinine is outside detectable limits.  Microalbuminuria is defined as an albumin:creatinine ratio of 17 to 299 for males and 25 to 299 for females. A ratio of albumin:creatinine of 300 or higher is indicative of overt proteinuria.  Due to biologic variability, positive results should be confirmed by a second, first-morning random or 24-hour timed urine specimen. If there is discrepancy, a third specimen is recommended. When 2 out of 3 results are in the microalbuminuria range, this is evidence for incipient nephropathy  and warrants increased efforts at glucose control, blood pressure control, and institution of therapy with an angiotensin-converting-enzyme (ACE) inhibitor (if the patient can tolerate it).      TSH 02/05/2024 1.26  0.30 - 4.20 uIU/mL Final    Cholesterol 02/05/2024 203 (H)  <200 mg/dL Final    Triglycerides 02/05/2024 67  <150 mg/dL Final    Direct Measure HDL 02/05/2024 74  >=50 mg/dL Final    LDL Cholesterol Calculated 02/05/2024 116 (H)  <=100 mg/dL Final    Non HDL Cholesterol 02/05/2024 129  <130 mg/dL Final    Patient Fasting > 8hrs? 02/05/2024 Yes   Final    Sodium 02/05/2024 138  135 - 145 mmol/L Final    Reference intervals for this test were updated on 09/26/2023 to more accurately reflect our healthy population. There may be differences in the flagging of prior results with similar values performed with this method. Interpretation of those prior results can be made in the context of the updated reference intervals.     Potassium 02/05/2024 4.7  3.4 - 5.3 mmol/L Final    Carbon Dioxide (CO2) 02/05/2024 27  22 - 29 mmol/L Final    Anion Gap 02/05/2024 9  7 - 15 mmol/L Final    Urea Nitrogen 02/05/2024 17.4  8.0 - 23.0 mg/dL Final    Creatinine 02/05/2024 0.68  0.51 - 0.95 mg/dL Final    GFR Estimate 02/05/2024 >90  >60 mL/min/1.73m2 Final    Calcium 02/05/2024 9.9  8.8 - 10.2 mg/dL Final    Chloride 02/05/2024 102  98 - 107 mmol/L Final    Glucose 02/05/2024 92  70 - 99 mg/dL Final    Alkaline Phosphatase 02/05/2024 75  40 - 150 U/L Final    Reference intervals for this test were updated on 11/14/2023 to more accurately reflect our healthy population. There may be differences in the flagging of prior results with similar values performed with this method. Interpretation of those prior results can be made in the context of the updated reference intervals.    AST 02/05/2024 19  0 - 45 U/L Final    Reference intervals for this test were updated on 6/12/2023 to more accurately reflect our healthy population.  There may be differences in the flagging of prior results with similar values performed with this method. Interpretation of those prior results can be made in the context of the updated reference intervals.    ALT 02/05/2024 16  0 - 50 U/L Final    Reference intervals for this test were updated on 6/12/2023 to more accurately reflect our healthy population. There may be differences in the flagging of prior results with similar values performed with this method. Interpretation of those prior results can be made in the context of the updated reference intervals.      Protein Total 02/05/2024 7.2  6.4 - 8.3 g/dL Final    Albumin 02/05/2024 4.4  3.5 - 5.2 g/dL Final    Bilirubin Total 02/05/2024 0.7  <=1.2 mg/dL Final    WBC Count 02/05/2024 8.7  4.0 - 11.0 10e3/uL Final    RBC Count 02/05/2024 4.44  3.80 - 5.20 10e6/uL Final    Hemoglobin 02/05/2024 13.1  11.7 - 15.7 g/dL Final    Hematocrit 02/05/2024 40.8  35.0 - 47.0 % Final    MCV 02/05/2024 92  78 - 100 fL Final    MCH 02/05/2024 29.5  26.5 - 33.0 pg Final    MCHC 02/05/2024 32.1  31.5 - 36.5 g/dL Final    RDW 02/05/2024 13.1  10.0 - 15.0 % Final    Platelet Count 02/05/2024 233  150 - 450 10e3/uL Final    25 OH Vitamin D2 02/05/2024 <5  ug/L Final    25 OH Vitamin D3 02/05/2024 32  ug/L Final    25 OH Vit D Total 02/05/2024 <37  20 - 75 ug/L Final    Season, race, dietary intake, and treatment affect the concentration of 25-hydroxy-Vitamin D. Values may decrease during winter months and increase during summer months. Values 20-29 ug/L may indicate Vitamin D insufficiency and values <20 ug/L may indicate Vitamin D deficiency.           Signed Electronically by: Helga Ibarra MD  Answers submitted by the patient for this visit:  Patient Health Questionnaire (Submitted on 12/17/2024)  If you checked off any problems, how difficult have these problems made it for you to do your work, take care of things at home, or get along with other people?: Very  difficult  PHQ9 TOTAL SCORE: 10  Patient Health Questionnaire (G7) (Submitted on 12/17/2024)  TIFFANY 7 TOTAL SCORE: 8

## 2024-12-19 NOTE — PATIENT INSTRUCTIONS
" [facility]  [address] [place] [address]  Office: [phone]   Fax:    [phone]       Mental health referral placed. Counseling coverage is very insurance driven. An intake person will call pt , but they  can narrow down possible choices by going to [email or web address].  In the search field type in therapists or counselors in the the [place] metro area and a bunch of therapists choices will come up with their pictures, backrounds, education and their areas of expertise.       Ask Dr. Pompa about recommendations for a new therapist as well.  Call her office or Placester message her re: that.  Do that today or tomorrow so you can have a plan for therapy for after Erika Francisco.     Sent a telephone encounter to Dr. Pompa re: pt's increased symptoms and anxiety with finding a new therapist.  See that encounter from today.       Pt will prioritize her sleep for a better mood for the following day.      Thank you so much or choosing [facility]  for your Health Care. It was a pleasure seeing you at your visit today! Please contact us with any questions or concerns you may have.                   Helga Ibarra MD                              To reach your [facility] care team after hours call:   [phone] press #[phone] \"to speak with your care team\".  This will get you to our clinic instead of routing to central [facility]  scheduling.     PLEASE NOTE OUR HOURS HAVE CHANGED secondary to COVID-19 coronavirus pandemic, as we are trying to minimize patient exposure to the virus,  which is now widespread in the [facility].  These hours may change with very little notice.  We apologize for any inconvenience.       Our current clinic hours are:          Monday- Thursday   7:00am - 6:00pm  in person.      Friday  7:00am- 5:00pm                       Saturday and Sunday : Closed to in person and virtual visits        We " have telephone and virtual visit times available between    7:00am - 6pm on Monday-Friday as well.                                                Phone:  [phone]      Our pharmacy hours: Monday through Friday 8:00am to 5:00pm                        Saturday - 9:00 am to 12 noon       Sunday : Closed.              Phone:  [phone]              ###  Please note: at this time we are not accepting any walk-in visits. ###      There is also information available at our web site:  [email or web address]    If your provider ordered any lab tests and you do not receive the results within 10 business days, please call the clinic.    If you need a medication refill please contact your pharmacy.  Please allow 3 business days for your refill to be completed.    Our clinic offers telephone visits and e visits.  Please ask one of your team members to explain more.      Use [facility] (secure email communication and access to your chart) to send your primary care provider a message or make an appointment. Ask someone on your Team how to sign up for [employer].

## 2024-12-30 ENCOUNTER — VIRTUAL VISIT (OUTPATIENT)
Dept: BEHAVIORAL HEALTH | Facility: CLINIC | Age: 69
End: 2024-12-30
Payer: COMMERCIAL

## 2024-12-30 DIAGNOSIS — G47.00 INSOMNIA, UNSPECIFIED TYPE: Primary | ICD-10-CM

## 2024-12-30 DIAGNOSIS — F39 MOOD DISORDER (H): ICD-10-CM

## 2024-12-30 DIAGNOSIS — F41.1 GENERALIZED ANXIETY DISORDER: ICD-10-CM

## 2024-12-30 PROCEDURE — 90832 PSYTX W PT 30 MINUTES: CPT | Mod: 95 | Performed by: SOCIAL WORKER

## 2024-12-31 NOTE — PROGRESS NOTES
[facility] - [place]    Behavioral Health Clinician Progress Note   Mental Health & Addiction Services      January 2 2025    Patient Name: Jolynn Haji       Service Type:  Individual   Service Location:  [facility] / Email (patient reached)   Visit Start Time: 1059am  Visit End Time:  1124am   Session Length: 16 - 37    Attendees: Client and daughter Malini  and  karina  Service Modality: Video Visit:      Provider verified identity through the following two step process.  Patient provided:  Patient is known previously to provider    Telemedicine Visit: The patient's condition can be safely assessed and treated via synchronous audio and visual telemedicine encounter.      Reason for Telemedicine Visit: Services only offered telehealth    Originating Site (Patient Location): Patient's home    Distant Site (Provider Location): Provider Remote Setting- Home Office    Consent:  The patient/guardian has verbally consented to: the potential risks and benefits of telemedicine (video visit) versus in person care; bill my insurance or make self-payment for services provided; and responsibility for payment of non-covered services.     Patient would like the video invitation sent by:  My Chart    Mode of Communication:  Video Conference via Amwell    Distant Location (Provider):  Off-site    As the provider I attest to compliance with applicable laws and regulations related to telemedicine.   Visit number: 8    [facility] Visit Activities (Refresh list every visit): [facility]     [place] Diagnostic Assessment: 2/28/24 Mica George MA [facility]  Treatment Plan Review Date: 11/20/24      DATA:    Extended Session (60+ minutes): No   Interactive Complexity: No   Crisis: No   [facility] Patient: No     Assessments completed prior to visit:   PHQ9:       10/8/2024     4:07 PM 10/14/2024    11:10 AM 11/18/2024     9:16 AM 12/2/2024     5:16 PM 12/10/2024     5:27 PM 12/12/2024     1:09 PM  12/17/2024     2:23 PM   PHQ-9 SCORE   PHQ-9 Total Score [place] 1 (Minimal depression) 1 (Minimal depression) 1 (Minimal depression) 2 (Minimal depression) 2 (Minimal depression)  10 (Moderate depression)   PHQ-9 Total Score 1 1 1  2  2  12 10        Patient-reported     GAD7:       4/25/2024     1:56 PM 8/12/2024     2:54 PM 10/12/2024     5:12 PM 10/14/2024    11:11 AM 12/2/2024     5:19 PM 12/12/2024     1:09 PM 12/17/2024     2:25 PM   TIFFANY-7 SCORE   Total Score 0 (minimal anxiety) 1 (minimal anxiety) 1 (minimal anxiety) 3 (minimal anxiety) 2 (minimal anxiety)  8 (mild anxiety)   Total Score 0 1 1 3 2  5 8        Patient-reported       Reason for Visit/Presenting Concern:  Anxiety    Current Stressors / Issues:  MH update:  Balance issues much worse.  Feels like mind is not working word finding.  Withdrawn and apprehensive of social setting.  Frustrated with self.  More isolative.  Not driving for 2 weeks.  Headaches are increased.  More bad days then good days.   Stresses:  Xmas went well, didn't enjoy  Appetite: n/a  Sleep: Fatigued,  anxiety at night, not sleeping as the best    Outpatient Provider updates: 1/15 [facility] Mica Valero  SI/SIB/HI:  passive ideation, no plan/intent.  No previous attempts.  Future and goal oriented.  Help seeking.    Side effects/compliance:  Interventions:  using calm lorena.   [facility] engaged in discussion thought patterns of anxiety and others viewing her differently with word finding.  Discussed giving self jerry.  Most important:  Changes made by PCP ANAMARIA.  Effexor increased.  Feels worse.    11/20   update:  Was changing Trazodone dose around was dosing half and 1/4 for the last 3 weeks and things feel off.  Doesn't feel like mood is as good but does feel more clear.  On going headache questions with effexor.   Stresses:  daughter is hosting thanksgiving.  Appetite: n/a  Sleep: n/a  Outpatient Provider updates: Erika MONTIEL  SI/SIB/HI: n/a  Side effects/compliance:  Interventions:   [facility] encouraged a headache log to better track information  Most important:  Wonders about genesight testing, wonders if this would be appropriate.  Trazdone dosing 1/4 or 1/2.  Wonders about effexor and headaches.     9/26   update:  having some headaches.  Went off trazodone.  Sleeping well without it.  Not as foggy.    Been off about a week[place]  Social Sikh events.  Grandkids back in school.  Granddaughter tough time in school/swimming.  Interested in doing more volunteer work  Stresses:  n/a  Appetite: n/a    Sleep: Doing well.  Outpatient Provider updates: Erika MONTIEL  SI/SIB/HI: n/a  Side effects/compliance:  Interventions:  [facility] engaged in support around anxiety mgmt  Most important:  Doing well     8/26 [facility] only  VBS was successful   Attended family wedding  Friends coming to town  Discussed anxiety mgmt during transitions  Discussed path of anxiety tx     7/31 [facility] only  Staying busy birthday parties  VBS next week  Being highly social  Son might have tick bite sx- high anxiety, calling him numerous times.  Discussed catatrophization/behaviors  Discussed people pleasing     6/29   update:  Going really well.  Have on going headaches.  I feels manageable however.  Way less brain fog.    Stresses:  Had to put in new furnaces and air conditioners.   Appetite: N/a   Sleep:  N/a  Outpatient Provider updates: Erika RAIN-Mat leave temporarily Nathalie MONTIEL [facility]  SI/SIB/HI: N/a  Side effects/compliance: N/a  Interventions:  [facility] discussed anxiety mgmt and TIPP skills/decision paralysis.   Most important:  Doing well!  Warning signs: not energy, not being social, not having odessa     2/28   update:  Reduced effexor since last visit.  On going well.  Brain fog gone.  Stresses:  Headache pain is reduced.  Appetite: Denies  Sleep: Trazadone works, no problem  Outpatient Provider updates: [facility] Erika OCHOA  Uses CALM lorena daily with afternoon anxiety  SI/SIB/HI:  Denies past attempts.  Previous passive  ideation  FARIDA:  Denies  Side effects/compliance:  N/a  Interventions:  [facility] engaged in completing DA with psychoeducation with treatment planning  Most important: Med changes are great.     Does homework and books to read.    Uses CALM lorena  5/4/3/2/1  May I skills  Journaling  Putting thoughts in box, for another day  Word lorena  Making choices can be difficult sets limits    Therapeutic Interventions:  Acceptance and Commitment Therapy (ACT): Worked with patient to identify values and the ways in which their behaviors are inconsistent with those values. Worked with patient to identify behaviors they could engage in to live in a manner that is more consistent with their values.    Response to treatment interventions:   Patient was receptive to interventions utilized.  Patient was engaged in the therapy process.       Progress on Treatment Objective(s) / Homework:   New Objective established this session - CONTEMPLATION (Considering change and yet undecided); Intervened by assessing the negative and positive thinking (ambivalence) about behavior change     Medication Review:   Changes to psychiatric medications, see updated Medication List in EPIC.      Medication Compliance:   Yes     Chemical Use Review:  Substance Use: Chemical use reviewed, no active concerns identified      Tobacco Use: No current tobacco use.       Assessment: Current Emotional / Mental Status (status of significant symptoms):    Risk status (Self / Other harm or suicidal ideation)   Patient denies a history of suicidal ideation, suicide attempts, self-injurious behavior, homicidal ideation, homicidal behavior, and and other safety concerns   Patient denies current fears or concerns for personal safety.   Patient denies current or recent suicidal ideation or behaviors.   Patient denies current or recent homicidal ideation or behaviors.   Patient denies current or recent self injurious behavior or ideation.   Patient denies other safety concerns.    Recommended that patient call 911 or go to the local ED should there be a change in any of these risk factors      ASSESSMENT:   Mental Status:     Appearance:   Appropriate    Eye Contact:   Good    Psychomotor Behavior: Normal    Attitude:   Cooperative    Orientation:   All   Speech Rate / Production: Normal    Volume:   Normal    Mood:    Normal   Affect:    Appropriate    Thought Content:  Clear    Thought Form:  Coherent  Logical    Insight:    Good          Diagnoses:   Generalized anxiety disorder    Collateral Reports Completed:   Communicated with: Dr chatman        Plan: (Homework, other):   Patient was provided No indications of CD issues  Patient was given information about behavioral services and encouraged to schedule a follow up appointment with the clinic [facility] as needed.         Mica George [facility], [facility]     _____________________________________________________________________________________________________________________________________                                              Individual Treatment Plan    Patient's Name: Jolynn Haji   YOB: 1955  Date of Creation: 11/20/24  Date Treatment Plan Last Reviewed/Revised: 11/20/24    DSM5 Diagnoses: 300.02 (F41.1) Generalized Anxiety Disorder  Psychosocial / Contextual Factors: Interpersonal Concerns  PROMIS (reviewed every 90 days):   The following assessments were completed by patient for this visit:  PROMIS 10-Global Health (only subscores and total score):       2/25/2024     2:04 PM 5/27/2024     7:01 PM 6/9/2024     8:48 PM 6/23/2024     8:01 PM 9/23/2024     9:33 AM 10/8/2024     4:11 PM 12/28/2024     3:39 PM   PROMIS-10 Scores Only   Global Mental Health Score 17 17 17 17    17 17    17    17 18 12    Global Physical Health Score 17 19 19 19    19 18    18    18 17 12    PROMIS TOTAL - SUBSCORES 34 36 36 36    36 35    35    35 35 24        Patient-reported        Referral / Collaboration:  Referral to another  professional/service is not indicated at this time..    Anticipated number of session for this episode of care:  6-9 sessions  Anticipation frequency of session: Monthly  Anticipated Duration of each session: 16-37 minutes  Treatment plan will be reviewed in 90 days or when goals have been changed.       MeasurableTreatment Goal(s) related to diagnosis / functional impairment(s)  Goal 1: Patient will learn and implement coping skills that result in a reduction of anxiety and nervousness.    I will know I've met my goal when I have reduced my anxiety.      Status: New - Date: 11/20/24      Intervention(s)  [facility] will Motivational Interviewing (MI): Validate patient's thoughts, feelings and experience. Express respect for patient's autonomy in decision making.  Ask open-ended questions to invite patient's self-reflection and self-direction around change and what is important for them in working towards their goals.      Patient has reviewed and agreed to the above plan.     Written by  Mica George [facility], [facility]

## 2025-01-01 ASSESSMENT — PATIENT HEALTH QUESTIONNAIRE - PHQ9
SUM OF ALL RESPONSES TO PHQ QUESTIONS 1-9: 8
SUM OF ALL RESPONSES TO PHQ QUESTIONS 1-9: 8
10. IF YOU CHECKED OFF ANY PROBLEMS, HOW DIFFICULT HAVE THESE PROBLEMS MADE IT FOR YOU TO DO YOUR WORK, TAKE CARE OF THINGS AT HOME, OR GET ALONG WITH OTHER PEOPLE: NOT DIFFICULT AT ALL

## 2025-01-02 ENCOUNTER — VIRTUAL VISIT (OUTPATIENT)
Dept: PSYCHIATRY | Facility: CLINIC | Age: 70
End: 2025-01-02
Payer: COMMERCIAL

## 2025-01-02 ENCOUNTER — VIRTUAL VISIT (OUTPATIENT)
Dept: BEHAVIORAL HEALTH | Facility: CLINIC | Age: 70
End: 2025-01-02
Payer: COMMERCIAL

## 2025-01-02 VITALS — HEIGHT: 70 IN | WEIGHT: 141 LBS | BODY MASS INDEX: 20.19 KG/M2

## 2025-01-02 DIAGNOSIS — F41.1 GENERALIZED ANXIETY DISORDER: Primary | ICD-10-CM

## 2025-01-02 DIAGNOSIS — R51.9 NONINTRACTABLE HEADACHE, UNSPECIFIED CHRONICITY PATTERN, UNSPECIFIED HEADACHE TYPE: ICD-10-CM

## 2025-01-02 DIAGNOSIS — R25.1 TREMOR: ICD-10-CM

## 2025-01-02 DIAGNOSIS — F41.1 GAD (GENERALIZED ANXIETY DISORDER): Primary | ICD-10-CM

## 2025-01-02 DIAGNOSIS — R42 DIZZINESS: ICD-10-CM

## 2025-01-02 DIAGNOSIS — R41.89 COGNITIVE CHANGES: ICD-10-CM

## 2025-01-02 DIAGNOSIS — Z87.820 HISTORY OF TRAUMATIC BRAIN INJURY: ICD-10-CM

## 2025-01-02 DIAGNOSIS — F39 MOOD DISORDER (H): ICD-10-CM

## 2025-01-02 ASSESSMENT — PAIN SCALES - GENERAL: PAINLEVEL_OUTOF10: NO PAIN (0)

## 2025-01-02 NOTE — PROGRESS NOTES
"Virtual Visit Details    Type of service:  Video Visit   Video Start Time: {video visit start/end time for provider to select:579104}  Video End Time:{video visit start/end time for provider to select:882713}    Originating Location (pt. Location): {video visit patient location:325327::\"Home\"}  {PROVIDER LOCATION On-site should be selected for visits conducted from your clinic location or adjoining [facility] hospital, academic office, or other nearby [facility] building. Off-site should be selected for all other provider locations, including home:751907}  Distant Location (provider location):  {virtual location provider:597934}  Platform used for Video Visit: {Virtual Visit Platforms:046078::\"[employer]\"}  "

## 2025-01-02 NOTE — PROGRESS NOTES
"Telemedicine Visit: The patient's condition can be safely assessed and treated via synchronous audio and visual telemedicine encounter.      Reason for Telemedicine Visit: Patient has requested telehealth visit    Originating Site (Patient Location): Patient's home    Distant Location (provider location): Off-Site    Consent:  The patient/guardian has verbally consented to: the potential risks and benefits of telemedicine (video visit) versus in person care; bill my insurance or make self-payment for services provided; and responsibility for payment of non-covered services.     Mode of Communication:  Video Conference via [place]    As the provider I attest to compliance with applicable laws and regulations related to telemedicine.        Outpatient Psychiatric Progress Note    Name: Jolynn Haji   : 1955                    Primary Care Provider: Helga Ibarra MD   Therapist: DINESH Campbell    PHQ-9 scores:      2024     1:09 PM 2024     2:23 PM 2025    11:51 AM   PHQ-9 SCORE   PHQ-9 Total Score MyChart  10 (Moderate depression) 8 (Mild depression)   PHQ-9 Total Score 12 10  8        Patient-reported       TIFFANY-7 scores:      2024     5:19 PM 2024     1:09 PM 2024     2:25 PM   TIFFANY-7 SCORE   Total Score 2 (minimal anxiety)  8 (mild anxiety)   Total Score 2  5 8        Patient-reported       Patient Identification:  Patient is a 69 year old,   White Choose not to answer female  who presents for return visit with me.  Patient is currently retired. Patient attended the phone/video session alone today. Patient prefers to be called: \" Azalea\".    Interim History:  I last saw Jolynn RESTREPO Adithya for outpatient psychiatry return visit on 2024. During that appointment, we:  Continue trazodone 12.5-50 mg at bedtime as needed for sleep  Continue Effexor-XR/venlafaxine ER 75 mg daily for anxiety.  Continue propranolol EXTENDED RELEASE 60 mg ONCE daily for " anxiety, tremor, headaches.  Continue all other cares per primary care provider.     1/2: Patient with worsening anxiety symptoms.  Mood also seems more down.  Frustration with worsening symptoms and decreased functioning.  Primary care provider did increase venlafaxine to 2 112.5 mg since patient's last visit with us.  Headaches are worse.  Balance struggles.  Worsening tremor.  Does report to this prescriber that she is sleeping well despite some of the worsening symptoms.  Taking 25 mg of trazodone at bedtime.  No acute safety concerns.  No SI.  No problematic drug or alcohol use.  See [facility] note below for additional details.    Per [facility], [facility], during today's team-based visit:  MH update:  Balance issues much worse.  Feels like mind is not working word finding.  Withdrawn and apprehensive of social setting.  Frustrated with self.  More isolative.  Not driving for 2 weeks.  Headaches are increased.  More bad days then good days.   Stresses:  Xmas went well, didn't enjoy  Appetite: n/a  Sleep: Fatigued,  anxiety at night, not sleeping as the best    Outpatient Provider updates: 1/15 [facility] Mica Valero  SI/SIB/HI:  passive ideation, no plan/intent.  No previous attempts.  Future and goal oriented.  Help seeking.    Side effects/compliance:  Interventions:  using calm lorena.   [facility] engaged in discussion thought patterns of anxiety and others viewing her differently with word finding.  Discussed giving self jerry.  Most important:  Changes made by PCP ANAMARIA.  Effexor increased.  Feels worse.    Per neurology note 5/2023:  Your neurologic exam and recent testing looks reassuring in terms of not having evidence of an emerging neurologic condition.     Feel free to follow up with me any time in the future if there is increase in tremor, increased stiffness/slowness of movement, or any other concerns.     Call  with questions.     Silverio Amato MD     Past Psychiatric Med Trials:  Psych Meds at  "Intake:  lexapro 20 mg daily  abilify 5 mg daily      Past Psych Meds:  Amitriptyline  Quetiapine  Mirtazapine - stopped \"because I was sleeping through the night;\" hallucinations     Psychiatric ROS:  Jolynn Haji reports mood has been: See HPI above  Anxiety has been: See HPI above  Sleep has been: Sleeping ok with trazodone  Seema sxs: None  Psychosis sxs: N/A  ADHD/ADD sxs: N/A  PTSD sxs: N/A  PHQ9 and GAD7 scores were reviewed today if completed.   Medication side effects: headaches?  Dizziness?  Tremor?  Current stressors include: Symptoms and See HPI above  Coping mechanisms and supports include: Family, Hobbies and Friends, therapy    Current medications include:   Current Outpatient Medications   Medication Sig Dispense Refill    clindamycin (CLEOCIN T) 1 % external lotion Apply topically 2 times daily 60 mL 3    lactobacillus rhamnosus, GG, (CULTURELL) capsule Take 1 capsule by mouth daily Taking every other day      propranolol (INDERAL) 10 MG tablet Take 1 tablet (10 mg) by mouth every 8 hours as needed (panic or severe anxiety feelings). 90 tablet 3    propranolol ER (INDERAL LA) 60 MG 24 hr capsule Take 1 capsule (60 mg) by mouth daily 90 capsule 3    traZODone (DESYREL) 50 MG tablet Take 0.5-1 tablets (25-50 mg) by mouth nightly as needed for sleep. 90 tablet 1    venlafaxine (EFFEXOR XR) 37.5 MG 24 hr capsule Take 1 capsule (37.5 mg) by mouth daily. Together with  the 75mg capsule daily =112.5mg daily 90 capsule 1    venlafaxine (EFFEXOR XR) 75 MG 24 hr capsule Take 1 capsule (75 mg) by mouth daily. Together with the 37.5mg capsule daily =112.5mg daily 90 capsule 1     Current Facility-Administered Medications   Medication Dose Route Frequency Provider Last Rate Last Admin    lidocaine 1 % 4 mL  4 mL INTRA-ARTICULAR Once Helga Ibarra MD        lidocaine 1 % injection 3 mL  3 mL   Giovany Quiroz MD   3 mL at 07/01/22 1120    lidocaine 1 % injection 4 mL  4 mL   Giovany Quiroz MD   4 " mL at 07/01/22 1120       Past Medical/Surgical History:  Past Medical History:   Diagnosis Date    Abnormal glandular Papanicolaou smear of cervix- ASCUS in 2000 12/5/2003    ASC H- 2000    Basal cell carcinoma of leg, right 05/2016    Dr Rogers    Colon polyps 05/2018    tubular adenoam - due 5 yrs - 2 mm    Concussion without loss of consciousness 4/12/2017    Hidradenitis     left  groin    HSIL (high grade squamous intraepithelial lesion) on Pap smear of cervix 7/5/2000    Normal paps from 2001 to 2016/cc    Hypertension goal BP (blood pressure) < 140/90     OA (osteoarthritis) of knee     moderate    Postmenopausal since 7/2008     Shingles 7/23/2010    left     Squamous cell carcinoma of skin, unspecified     Synovial cyst of popliteal space       has a past medical history of Abnormal glandular Papanicolaou smear of cervix- ASCUS in 2000 (12/5/2003), Basal cell carcinoma of leg, right (05/2016), Colon polyps (05/2018), Concussion without loss of consciousness (4/12/2017), Hidradenitis, HSIL (high grade squamous intraepithelial lesion) on Pap smear of cervix (7/5/2000), Hypertension goal BP (blood pressure) < 140/90, OA (osteoarthritis) of knee, Postmenopausal (since 7/2008 ), Shingles (7/23/2010), Squamous cell carcinoma of skin, unspecified, and Synovial cyst of popliteal space.    She has no past medical history of Malignant melanoma (H) or Squamous cell carcinoma.    Social History:  Reviewed. No changes to social history except as noted above in HPI.    Vital Signs:   None. This is phone/video visit.     Labs:  Most recent laboratory results reviewed and the pertinent results include:   TSH   Date Value Ref Range Status   02/05/2024 1.26 0.30 - 4.20 uIU/mL Final   04/25/2022 1.30 0.40 - 4.00 mU/L Final   01/13/2021 2.07 0.40 - 4.00 mU/L Final     Lab Results   Component Value Date    WBC 8.1 04/25/2022    WBC 6.4 01/13/2021     Lab Results   Component Value Date    RBC 4.58 04/25/2022    RBC 4.54  01/13/2021     Lab Results   Component Value Date    HGB 13.6 04/25/2022    HGB 13.3 01/13/2021     Lab Results   Component Value Date    HCT 41.1 04/25/2022    HCT 41.7 01/13/2021     No components found for: MCT  Lab Results   Component Value Date    MCV 90 04/25/2022    MCV 92 01/13/2021     Lab Results   Component Value Date    MCH 29.7 04/25/2022    MCH 29.3 01/13/2021     Lab Results   Component Value Date    MCHC 33.1 04/25/2022    MCHC 31.9 01/13/2021     Lab Results   Component Value Date    RDW 12.9 04/25/2022    RDW 13.1 01/13/2021     Lab Results   Component Value Date     04/25/2022     01/13/2021     Last Comprehensive Metabolic Panel:  Sodium   Date Value Ref Range Status   02/05/2024 138 135 - 145 mmol/L Final     Comment:     Reference intervals for this test were updated on 09/26/2023 to more accurately reflect our healthy population. There may be differences in the flagging of prior results with similar values performed with this method. Interpretation of those prior results can be made in the context of the updated reference intervals.    01/13/2021 140 133 - 144 mmol/L Final     Potassium   Date Value Ref Range Status   02/05/2024 4.7 3.4 - 5.3 mmol/L Final   04/25/2022 4.4 3.4 - 5.3 mmol/L Final   01/13/2021 4.1 3.4 - 5.3 mmol/L Final     Chloride   Date Value Ref Range Status   02/05/2024 102 98 - 107 mmol/L Final   04/25/2022 105 94 - 109 mmol/L Final   01/13/2021 108 94 - 109 mmol/L Final     Carbon Dioxide   Date Value Ref Range Status   01/13/2021 28 20 - 32 mmol/L Final     Carbon Dioxide (CO2)   Date Value Ref Range Status   02/05/2024 27 22 - 29 mmol/L Final   04/25/2022 26 20 - 32 mmol/L Final     Anion Gap   Date Value Ref Range Status   02/05/2024 9 7 - 15 mmol/L Final   04/25/2022 6 3 - 14 mmol/L Final   01/13/2021 4 3 - 14 mmol/L Final     Glucose   Date Value Ref Range Status   02/05/2024 92 70 - 99 mg/dL Final   04/25/2022 104 (H) 70 - 99 mg/dL Final   01/13/2021 91  70 - 99 mg/dL Final     Comment:     Fasting specimen     Urea Nitrogen   Date Value Ref Range Status   02/05/2024 17.4 8.0 - 23.0 mg/dL Final   04/25/2022 23 7 - 30 mg/dL Final   01/13/2021 18 7 - 30 mg/dL Final     Creatinine   Date Value Ref Range Status   02/05/2024 0.68 0.51 - 0.95 mg/dL Final   01/13/2021 0.68 0.52 - 1.04 mg/dL Final     GFR Estimate   Date Value Ref Range Status   02/05/2024 >90 >60 mL/min/1.73m2 Final   01/13/2021 >90 >60 mL/min/[1.73_m2] Final     Comment:     Non  GFR Calc  Starting 12/18/2018, serum creatinine based estimated GFR (eGFR) will be   calculated using the Chronic Kidney Disease Epidemiology Collaboration   (CKD-EPI) equation.       Calcium   Date Value Ref Range Status   02/05/2024 9.9 8.8 - 10.2 mg/dL Final   01/13/2021 8.8 8.5 - 10.1 mg/dL Final     Bilirubin Total   Date Value Ref Range Status   02/05/2024 0.7 <=1.2 mg/dL Final   01/13/2021 0.8 0.2 - 1.3 mg/dL Final     Alkaline Phosphatase   Date Value Ref Range Status   02/05/2024 75 40 - 150 U/L Final     Comment:     Reference intervals for this test were updated on 11/14/2023 to more accurately reflect our healthy population. There may be differences in the flagging of prior results with similar values performed with this method. Interpretation of those prior results can be made in the context of the updated reference intervals.   01/13/2021 72 40 - 150 U/L Final     ALT   Date Value Ref Range Status   02/05/2024 16 0 - 50 U/L Final     Comment:     Reference intervals for this test were updated on 6/12/2023 to more accurately reflect our healthy population. There may be differences in the flagging of prior results with similar values performed with this method. Interpretation of those prior results can be made in the context of the updated reference intervals.     01/13/2021 17 0 - 50 U/L Final     AST   Date Value Ref Range Status   02/05/2024 19 0 - 45 U/L Final     Comment:     Reference intervals  for this test were updated on 6/12/2023 to more accurately reflect our healthy population. There may be differences in the flagging of prior results with similar values performed with this method. Interpretation of those prior results can be made in the context of the updated reference intervals.   01/13/2021 13 0 - 45 U/L Final     Review of Systems:  10 systems (general, cardiovascular, respiratory, eyes, ENT, endocrine, GI, , M/S, neurological) were reviewed. Most pertinent finding(s) is/are: Some chronic pains, intermittent headaches, see HPI above. The remaining systems are all unremarkable.    Mental Status Examination (limited as this is by phone/video):  Appearance: Awake, alert, appears stated age, no acute distress, well-groomed   Attitude:  cooperative, pleasant   Motor: No gross abnormalities noted today.  Not formally tested  Oriented to:  person, place, time, and situation  Attention Span and Concentration: Attention seems intact, concentration struggles noted but not formally tested  Speech: Normal volume, some hesitation noted, possible word-finding difficulties?  Language: possible word-finding difficulties?  Mood: very anxious  Affect: Mood congruent  Associations:  no loose associations  Thought Process: Overall linear and logical  Thought Content:  no evidence of suicidal ideation or homicidal ideation, some mild paranoia?, no auditory hallucinations present and no visual hallucinations present  Recent and Remote Memory: Seems to be struggling more with some short-term memory -not formally tested  Fund of Knowledge: appropriate  Insight: Good  Judgment:  intact, adequate for safety  Impulse Control:  intact    Suicide Risk Assessment:  Today Jolynn Haji reports no suicidal ideation. Based on all available evidence including the factors cited above, Jolynn Haji does not appear to be at imminent risk for self-harm, does not meet criteria for a 72-hr hold, and therefore remains appropriate for  ongoing outpatient level of care.  A thorough assessment of risk factors related to suicide and self-harm have been reviewed and are noted above. The patient convincingly denies suicidality on several occasions. Local community safety resources reviewed for patient to use if needed. There was no deceit detected, and the patient presented in a manner that was believable.     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  R/O Underlying Parkinson's Disease (pt has seen neurology - continuing to monitor)    Medical comorbidities include:   Patient Active Problem List    Diagnosis Date Noted    Essential hypertension with goal blood pressure less than 140/90 - then Orthostatic hypotension- resolved off of lisinopril 2.5mg daily and doing daily 2.25 mile walks with her dog 07/16/2001     Priority: High    Moderate recurrent major depression (H) 12/19/2024     Priority: Medium    Orthostatic hypotension- resolved off of lisinopril 2.5mg daily and doing daily 2.25 mile walks with her dog 10/14/2024     Priority: Medium    Major depressive disorder, recurrent, mild (H) 12/06/2023     Priority: Medium    Bilateral lower extremity edema- trace pitting edema distal LE to mid pretibial areas Left > right 09/27/2023     Priority: Medium    Tremor 09/27/2023     Priority: Medium    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 - lower pretibial area  07/12/2023     Priority: Medium    Drug-induced parkinsonism (H)- Abilify - generic = ARIPiprazole( 5mg) - RESOLVED OFF ABILIFY  01/02/2023     Priority: Medium    Mood disorder (H) 01/02/2023     Priority: Medium    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 - much improved - resolved as of 5/5/2023 07/07/2022     Priority: Medium    Adjustment insomnia -  resolved as of 5/5/2023 - off of ambien with psychiatry's okay  12/23/2021     Priority: Medium    Screening for cervical cancer      Priority: Medium     1999 NIL  2000 ASCUS-H.  >> Colpo: Neg  2001 NIL   2003 NIL pap, Neg HPV  [id number], 2016  NIL pap  7/25/19 NIL pap, Neg HPV    Criteria necessary to stop screening per ASCCP guidelines:  Older than 65 years  Three consecutive negative cytology results or two consecutive negative cotest results within the previous 10 years, with the most recent being performed within the last 5 years.   (Women with hx of MADDIE 2 or 3, or adenocarcinoma in situ should continue screening for full 20 years, even if this goes past age 65).        Hyperlipidemia LDL goal <130 11/30/2018     Priority: Medium    Basal cell carcinoma      Priority: Medium    Colon polyps 05/01/2018     Priority: Medium    Primary localized osteoarthrosis of left lower leg 02/19/2018     Priority: Medium    Other secondary osteoarthritis of left knee 01/29/2018     Priority: Medium    Chronic constipation 09/14/2017     Priority: Medium    Raynaud's phenomenon without gangrene 09/14/2017     Priority: Medium    Insomnia, unspecified type 07/25/2017     Priority: Medium    TIFFANY (generalized anxiety disorder) 05/23/2017     Priority: Medium    Adjustment disorder with mixed anxiety and depressed mood- mild  at this time 05/11/2017     Priority: Medium    Family history of celiac disease 05/11/2017     Priority: Medium    Basal cell carcinoma of leg, right 05/01/2016     Priority: Medium    Sun-damaged skin 04/21/2016     Priority: Medium    Seasonal allergic rhinitis 04/21/2016     Priority: Medium    Dupuytren's disease of palm - right 4th digit flexor tendon 06/11/2015     Priority: Medium    History of migraine headaches- assoc. with nausea/vomiting - resolved around menopause - were  premenstrual  01/08/2014     Priority: Medium    Rosacea 05/01/2013     Priority: Medium    Numbness of feet- distal feet R>L   02/21/2011     Priority: Medium    NUMBNESS AND TINGLING OF FOOT - bilateral -mild  08/25/2010     Priority: Medium    Osteopenia 01/02/2009     Priority: Medium    Lipidosis 10/24/2006     Priority: Medium     Problem list name updated by automated process. Provider to review      Localized osteoarthritis of hand 11/01/2005     Priority: Medium     Problem list name updated by automated process. Provider to review      Stress incontinence, female - mild 10/23/2012     Priority: Low    Cough 10/23/2012     Priority: Low    Acute bronchitis- recurrent- ? related to mold in school building- pt has since retired and no further bronchitis 10/23/2012     Priority: Low    Postmenopausal      Priority: Low    DERMATOPHYTOSIS OF NAIL- toenails  03/14/2006     Priority: Low     trichophyton rubrum on culture - 1/06       Synovial cyst of popliteal space      Priority: Low    Hidradenitis 12/05/2003     Priority: Low       Psychosocial & Contextual Factors: see HPI above    Assessment:  From Intake, 5/12/2022:  Jolynn Haji is a 66-year-old female with a history including anxiety, depression, insomnia status post concussion in April 2017.  No major mental health struggles prior to concussion in 2017.  Patient was started on Lexapro, quetiapine, mirtazapine to help with her symptoms.  Patient had been feeling much better on that medication combination and once she was sleeping well again quetiapine and mirtazapine were discontinued.  This past January the patient started to have some worsening symptoms and Abilify was added.  Patient has felt like her current medications of Lexapro and Abilify have been helpful but reports starting to feel really slowed and numb the past several weeks.  She also reported a weight loss of nearly 20 pounds over the last 6 weeks versus several months (patient was not very sure). I am wondering if she could be experiencing side effects of her increased Lexapro dose.  She is agreeable to decreasing  her dose slightly to 15 mg daily.  Could also consider decreasing Abilify in the case it is not her Lexapro.  She also desires to sleep a little bit better than she has been.  Could consider restarting mirtazapine at bedtime for sleep and to also augment her Lexapro.  Could also be beneficial for patient to undergo cognitive screening to check in and see what her baseline is status post concussion.  No acute safety concerns.  No SI.  No problematic drug or alcohol use.    5/24/2022:  Patient with some slight improvements of possible medication related side effects after decreasing Lexapro.  I am wondering if there might be more robust improvements with decreasing Abilify/aripiprazole.  Possible drug-induced Parkinsonism?  She does seem to have delayed/slowed speech, masked facies.  Her gait and other movements unable to be observed but she does report other struggles that may be consistent with such a presentation.  We are going to decrease her Abilify down to 2 mg daily and I will see her back in just a few weeks.  We will likely discontinue her Abilify at her next visit.  I will message her primary care provider to get some collateral regarding previous baseline functioning prior to starting Abilify.  History of TBI could be confounding presentation.  Could consider neurology consult.    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  Instructed to stop Abilify today.  We will monitor for improvement of symptoms.  If symptoms do not improve may need to refer to neurology.  If symptoms start to improve we will consider low-dose methylphenidate augmentation of Lexapro next visit to help with low energy, mood, focus and concentration, cognition, s/p TBI if still clinically indicated.  We would discuss risks and benefits with patient and her daughter at that time.  No other acute safety concerns or SI.  No problematic drug or alcohol use.  If patient starts to struggle with sleep would  restart mirtazapine at bedtime.    7/28/2022:  Pt still with sxs consistent with drug-induced Parksinsonism.  Neurology consult will be placed so that patient might get scheduled sooner than later due to the typically long wait.  We will start Ambien in hopes we can get her sleeping much better.  Discussed risks and benefits of its use, particularly in the geriatric population.  Lexapro has not been as helpful as it helped for her anxiety.  We will taper this medication and start Effexor/venlafaxine in hopes that might be more effective for her symptoms.  No acute safety concerns.  No SI.  No problematic drug or alcohol use.    8/11/2022:  Overall with continued improved symptoms.  Hopefully patient will continue to have improvement of her symptoms off of Abilify.  Patient will be seeing neurology soon.  Encouraged to keep appointment.  We will continue taper off of Lexapro and continue on monotherapy with venlafaxine.  We will also continue Ambien at this time for sleep since patient is sleeping much better on the medication with improved symptoms and tolerating well.  No acute safety concerns.  No problematic drug or alcohol use.    9/1/2022:  Ongoing anxiety, still intense at times.  Still not sleeping very well.  Could consider sleep evaluation, but patient quite overwhelmed right now with various referrals and doctors appointments.  Briseyda had been working quite well but not keeping her sleep now.  Still falling asleep relatively well.  We will transition to Ambien CR to see if that is more helpful.  We will continue to consider sleep evaluation to rule out possibility of REM behavior sleep disorder.  We will also transition venlafaxine to bedtime to see if that helps with any possible nausea.  Working with neurology to monitor for Parkinson's disease.  I do recommend neuropsychological testing due to ongoing possible cognitive difficulties/struggles.  No acute safety concerns.  No SI.  No problematic drug or  alcohol use.    10/3/2022:  Patient overall with some ongoing anxiety but slowly improving since it got worse late August.  Recommend continuing to optimize Effexor-XR, especially since patient tolerating well.  No longer having any nausea now that dose is taken at bedtime.  Patient feeling a little foggy and off balance with Ambien CR and so we will switch back to Ambien immediate release.  No falls.  Recommend increasing Effexor-XR first and once well tolerated switching back to Ambien immediate release.  May need to continue to work on alternative options for sleep if does not do well back on Ambien immediate release.  No acute safety concerns.  No SI.  No problematic drug or alcohol use.  Encouraged to continue working with neurology.  Next appointment in November.    10/24/2022:  Patient overall with some improved anxiety on increased dose of Effexor-XR.  Could use additional improvement of anxiety and so we will continue to optimize therapy with Effexor-XR.  We will increase dose to 150 mg daily.  Tolerating well with no notable negative side effects.  Patient transitioned back to Ambien CR and is sleeping better on this compared to Ambien immediate release.  We will continue Ambien CR for sleep.  Discussed possibility of sleep medicine referral but patient declines at this time.  Could consider in the future.  No acute safety concerns.  No SI.  No problematic drug or alcohol use.  Encouraged to continue working with neurology.    12/09/2022:  Patient overall doing fairly well.  Feels like increased dose of venlafaxine has been helpful.  I do wonder if she is having some subtle withdrawal symptoms late in the afternoon with headache and fatigue.  Discussed shifting her dose to the morning.  However, she reports she will try to drink more water and see if that is helpful before trying to make changes to her medication.  She feels like things are going well enough that she really does not want to change  anything.  Encouraged to continue in therapy.  No acute safety concerns.  No SI.  No problematic drug or alcohol use.    1/19/2023:  Patient overall doing well with ongoing improvements.  Patient even recently starting to drive again which is a big deal.  She continues in therapy and finds it helpful.  Discussed possible changes to her dosing to see if headaches might improve, along with fatigue.  Discussed possibly splitting her Effexor-XR dose as 75 mg twice daily versus 37 point 5 in the morning and 112.5 mg at bedtime.  Patient feels like she is in a good place right now and really does not want to make any medication changes if possible.  We will keep things the same for now.  Encouraged to drink lots of water.  No acute safety concerns.  No SI.  No problematic drug or alcohol use.    3/9/2023:  Since last visit we decreased Effexor-XR just slightly.  Unfortunately her anxiety has worsened on decreased dose.  Headaches and fatigue did not improve at all.  Since anxiety worsened we will go back up to 150 mg daily.  Patient prefers this over a trial of further decreased dose.  We will start a trial with propranolol to see if this helps improve anxiety and headaches.  May also help with her tremor.  Discussed watching for feeling too lightheaded or dizzy.  Patient also on low-dose lisinopril.  Last vital signs blood pressure 132/78 and pulse 96 on 2/24/2023.  No acute safety concerns.  No SI.  No problematic drug or alcohol use.    3/23/2023:  Patient overall doing very well.  Propranolol has been quite helpful.  Still having some anxiety and headaches so we will continue to optimize therapy with propranolol some.  We will increase to 20 mg twice daily.  Neuropsychiatric testing reviewed.  Some mild deficits but nothing indicating a neurocognitive disorder at this time.  This was reassuring to the patient.  Patient will review results with neurologist at upcoming appointment in May.  No acute safety concerns.  No  SI.  No problematic drug or alcohol use.    4/27/2023:  Patient overall with relative stability of mood and anxiety symptoms.  We will work to discontinue Ambien to see if we can manage on medication such as trazodone.  I still continue to worry about possibility of prodromal Parkinson's disease.  Patient reported her sister noted patient to be yelling and screaming in her sleep the other night.  This concerns me for possible REM sleep behavior disorder.  Could simply be related to antidepressant therapy-but given recent parkinsonism response to neuroleptic, symptoms should continue to be monitored closely.  Could again consider sleep medicine referral at some point in the future if needed.  Patient also continues to have headaches.  If patient remains stable would like to trial decreased dose of Effexor-XR to see if this helps improve headaches.  No acute safety concerns.  No SI.  No problematic drug or alcohol use.    5/11/2023:  Patient overall doing well.  Was able to replace Ambien with trazodone and still sleeping well.  Still some headaches but greatly improved off Ambien.  Went back to taking propranolol 20 mg twice daily and finds this most effective for her anxiety, tremor, etc.  Discussed we could consider lowering venlafaxine ER in a couple of months if still doing really well and still having some headaches.  Patient has a neurology appointment tomorrow for check-in.  No acute safety concerns.  No SI.  No problematic drug or alcohol use.    7/11/2023:  Patient overall doing well still.  Mood has remained stable.  Anxiety overall manageable and stable as well.  Unfortunately continues to have headaches and feel fatigued.  Discussed I would like to move ahead with a trial of decreased venlafaxine.  Patient agreeable and will monitor mental health symptoms.  No acute safety concerns.  No SI.  No problematic drug or alcohol use.    10/10/2023:  Patient overall doing okay.  Unfortunately headaches and  fatigue have persisted even with decreased dose of venlafaxine.  We will increase propranolol and changed to extended release so it is once a day to see if that is a little bit more helpful for anxiety and headaches.  Patient controlled decreased dose of venlafaxine and we may consider changing to something different after the holidays.  We would like to prevent any decompensation during the holiday season.  No acute safety concerns.  No SI.  No problematic drug or alcohol use.  Patient continues in therapy.    1/9/2024:  Patient overall doing relatively well.  Still getting headaches from venlafaxine so we will decrease the dose further to 75 mg daily to see if helpful without worsening mental health symptoms.  No acute safety concerns.  No SI.  No problematic drug or alcohol use.    2/28/2024:  Patient doing relatively well.  Still some anxiety but manageable.  Anxiety does not currently interfere with patient's ability to function or go about daily activities.  Easily able to distract self.  No major mood symptoms at this time.  Side effects from venlafaxine have drastically improved on lower dose.  If mental health symptoms/anxiety were to worsen in the future, would strongly consider transition to sertraline due to negative side effects on higher doses of venlafaxine.  No acute safety concerns.  No SI.  No problematic drug or alcohol use.    6/26/2024:  Patient overall doing very well.  Mood has been stable.  Anxiety manageable.  Does use Tylenol twice a day every day for headaches.  Discussed possibility of some rebound headaches.  Resources sent to patient via [employer].  We will continue to monitor dizziness.  Blood pressures can be a little low at times.  We discussed trazodone can cause dizziness and orthostatic hypotension (blood pressure drops upon standing).  Patient wonders if it is more related to her lisinopril.  Patient encouraged to document blood pressures and present to primary care provider for  ongoing evaluation.  No acute safety concerns.  No SI.  No problematic drug or alcohol use.  No medication changes today.    9/26/2024:  Patient overall doing well.  Relatively stable.  Managing anxiety symptoms well.  Sleeping well without nightly trazodone.  Discussed patient could consider massage therapy or acupuncture to see if it improves headaches.  Patient prefers not to change Effexor at this time.  Discussed patient could also talk with primary care provider about possible neurology referral for chronic headaches.  No acute safety concerns.  No SI.  No problematic drug or alcohol use.  No med changes today.    11/20/2024:  Pt doing ok - still some ongoing anxiety and headaches - possibly from Effexor. Pt will consider transitioning off Effexor-XR at next appt 1/2 (after the holidays). Could start fluoxetine 10 mg daily for 5 days (with effexor-xr 75) then decrease Effexor-XR to 37.5 mg (and increase fluoxetine to 20 mg daily) for 7 days then stop Effexor-xr and either continue of fluoxetine or trial a new medication (duloxetine?). No acute safety concerns. No SI. No problematic drug or alcohol use.      1/2/2025:  Patient unfortunately with some worsening symptoms.  Worsening chronic headaches.  Worsening tremor.  Ongoing balance struggles that are perhaps worse.  Struggling more with memory.  Anxiety greatly increased.  Patient agreeable to transitioning off venlafaxine and onto sertraline/Zoloft.  Patient also encouraged to follow up with neurology.  CT scan of head/brain ordered without contrast due to ongoing/worsening headaches and cognitive concerns and balance issues.  Last CT scan of head completed in 2017.  No acute safety concerns.  No SI.  No problematic drug or alcohol use.    Medication side effects and alternatives were reviewed. Health promotion activities recommended and reviewed today. All questions addressed. Education and counseling completed regarding risks and benefits of medications  and psychotherapy options. Recommend therapy for additional support.     Treatment Plan:   Continue trazodone 12.5-50 mg at bedtime as needed for sleep  Continue propranolol EXTENDED RELEASE 60 mg ONCE daily for anxiety, tremor, headaches.  Make the following medication changes:  Days 1-5  Decrease venlafaxine/Effexor-XR to 75 mg daily  Start sertraline/Zoloft 25 mg daily  Days 6-15  Decrease venlafaxine/Effexor-XR to 37.5 mg daily  Increase sertraline to 50 mg daily  Days 16-25  Decrease venlafaxine/Effexor-XR to 37.5 mg once EVERY OTHER day  Increase sertraline/Zoloft to 75 mg daily  Days 26 and on  STOP venlafaxine/Effexor-XR   Increase sertraline/Zoloft to 100 mg daily as tolerated (or ok to stay at 75 mg dose)  Schedule follow-up with you neurologist, Dr. Amato. Can call [phone] to get scheduled.   Schedule and complete CT scan of head/brain. Someone from CT/imaging department should call to get you scheduled for a CT scan of your brain.   Continue all other cares per primary care provider.   Continue all other medications as reviewed per electronic medical record today.   Safety plan reviewed. To the Emergency Department as needed or call after hours crisis line at [phone] or [phone]. [place] Crisis Text Line. Text [place] to 664100 or Suicide LifeLine Chat: suicidepreventionlifeline.org/chat  Continue individual psychotherapy for additional support and ongoing development of nonpharmacologic coping skills and strategies.  Schedule an appointment with me in 2-3 weeks or sooner as needed. Call [facility] at [phone] to schedule.  Follow up with primary care provider as planned or for acute medical concerns.  Call the psychiatric nurse line with medication questions or concerns at [phone].  MyChart may be used to communicate with your provider, but this is not intended to be used for emergencies.    Administrative Billing:   Phone Call/Video Duration: 28 Minutes  Start:  12:38p  Stop: 12:06p    The longitudinal plan of care for the diagnosis(es)/condition(s) as documented were addressed during this visit. Due to the added complexity in care, I will continue to support [place] in the subsequent management and with ongoing continuity of care.    Patient Status:  Continuous care patient.     Signed:   Judy Pompa DO  [facility]    Disclaimer: This note consists of symbols derived from keyboarding, dictation and/or voice recognition software. As a result, there may be errors in the script that have gone undetected. Please consider this when interpreting information found in this chart.

## 2025-01-02 NOTE — NURSING NOTE
Current patient location: [address]  [facility] [address]    Is the patient currently in the state of [place]? YES    Visit mode:VIDEO    If the visit is dropped, the patient can be reconnected by:VIDEO VISIT: Text to cell phone:   Telephone Information:   Mobile [phone]       Will anyone else be joining the visit? Daughter with patient.  might join    (If patient encounters technical issues they should call [phone])    Are changes needed to the allergy or medication list? No    Are refills needed on medications prescribed by this physician? Discuss with provider    Rooming Documentation:  Questionnaire(s) completed    Reason for visit: RECHECK    [place] QUEEN

## 2025-01-02 NOTE — PATIENT INSTRUCTIONS
Treatment Plan:   Continue trazodone 12.5-50 mg at bedtime as needed for sleep  Continue propranolol EXTENDED RELEASE 60 mg ONCE daily for anxiety, tremor, headaches.  Make the following medication changes:  Days 1-5  Decrease venlafaxine/Effexor-XR to 75 mg daily  Start sertraline/Zoloft 25 mg daily  Days 6-15  Decrease venlafaxine/Effexor-XR to 37.5 mg daily  Increase sertraline to 50 mg daily  Days 16-25  Decrease venlafaxine/Effexor-XR to 37.5 mg once EVERY OTHER day  Increase sertraline/Zoloft to 75 mg daily  Days 26 and on  STOP venlafaxine/Effexor-XR   Increase sertraline/Zoloft to 100 mg daily as tolerated (or ok to stay at 75 mg dose)  Schedule follow-up with you neurologist, Dr. Amato. Can call [phone] to get scheduled.   Schedule and complete CT scan of head/brain. Someone from CT/imaging department should call to get you scheduled for a CT scan of your brain.   Continue all other cares per primary care provider.   Continue all other medications as reviewed per electronic medical record today.   Safety plan reviewed. To the Emergency Department as needed or call after hours crisis line at [phone] or [phone]. [place] Crisis Text Line. Text [place] to 940130 or Suicide LifeLine Chat: suicidepreventionlifeline.org/chat  Continue individual psychotherapy for additional support and ongoing development of nonpharmacologic coping skills and strategies.  Schedule an appointment with me in 2-3 weeks or sooner as needed. Call [facility] at [phone] to schedule.  Follow up with primary care provider as planned or for acute medical concerns.  Call the psychiatric nurse line with medication questions or concerns at [phone].  MyChart may be used to communicate with your provider, but this is not intended to be used for emergencies.

## 2025-01-06 ASSESSMENT — ANXIETY QUESTIONNAIRES
7. FEELING AFRAID AS IF SOMETHING AWFUL MIGHT HAPPEN: NOT AT ALL
IF YOU CHECKED OFF ANY PROBLEMS ON THIS QUESTIONNAIRE, HOW DIFFICULT HAVE THESE PROBLEMS MADE IT FOR YOU TO DO YOUR WORK, TAKE CARE OF THINGS AT HOME, OR GET ALONG WITH OTHER PEOPLE: SOMEWHAT DIFFICULT
1. FEELING NERVOUS, ANXIOUS, OR ON EDGE: NEARLY EVERY DAY
5. BEING SO RESTLESS THAT IT IS HARD TO SIT STILL: NOT AT ALL
2. NOT BEING ABLE TO STOP OR CONTROL WORRYING: MORE THAN HALF THE DAYS
7. FEELING AFRAID AS IF SOMETHING AWFUL MIGHT HAPPEN: NOT AT ALL
6. BECOMING EASILY ANNOYED OR IRRITABLE: NOT AT ALL
3. WORRYING TOO MUCH ABOUT DIFFERENT THINGS: NEARLY EVERY DAY
8. IF YOU CHECKED OFF ANY PROBLEMS, HOW DIFFICULT HAVE THESE MADE IT FOR YOU TO DO YOUR WORK, TAKE CARE OF THINGS AT HOME, OR GET ALONG WITH OTHER PEOPLE?: SOMEWHAT DIFFICULT
GAD7 TOTAL SCORE: 9
GAD7 TOTAL SCORE: 9
4. TROUBLE RELAXING: SEVERAL DAYS

## 2025-01-08 ASSESSMENT — ANXIETY QUESTIONNAIRES
IF YOU CHECKED OFF ANY PROBLEMS ON THIS QUESTIONNAIRE, HOW DIFFICULT HAVE THESE PROBLEMS MADE IT FOR YOU TO DO YOUR WORK, TAKE CARE OF THINGS AT HOME, OR GET ALONG WITH OTHER PEOPLE: NOT DIFFICULT AT ALL
5. BEING SO RESTLESS THAT IT IS HARD TO SIT STILL: NOT AT ALL
GAD7 TOTAL SCORE: 6
GAD7 TOTAL SCORE: 6
7. FEELING AFRAID AS IF SOMETHING AWFUL MIGHT HAPPEN: NOT AT ALL
7. FEELING AFRAID AS IF SOMETHING AWFUL MIGHT HAPPEN: NOT AT ALL
1. FEELING NERVOUS, ANXIOUS, OR ON EDGE: NEARLY EVERY DAY
6. BECOMING EASILY ANNOYED OR IRRITABLE: NOT AT ALL
GAD7 TOTAL SCORE: 6
3. WORRYING TOO MUCH ABOUT DIFFERENT THINGS: SEVERAL DAYS
8. IF YOU CHECKED OFF ANY PROBLEMS, HOW DIFFICULT HAVE THESE MADE IT FOR YOU TO DO YOUR WORK, TAKE CARE OF THINGS AT HOME, OR GET ALONG WITH OTHER PEOPLE?: NOT DIFFICULT AT ALL
4. TROUBLE RELAXING: SEVERAL DAYS
2. NOT BEING ABLE TO STOP OR CONTROL WORRYING: SEVERAL DAYS

## 2025-01-08 ASSESSMENT — PATIENT HEALTH QUESTIONNAIRE - PHQ9
SUM OF ALL RESPONSES TO PHQ QUESTIONS 1-9: 11
SUM OF ALL RESPONSES TO PHQ QUESTIONS 1-9: 11
10. IF YOU CHECKED OFF ANY PROBLEMS, HOW DIFFICULT HAVE THESE PROBLEMS MADE IT FOR YOU TO DO YOUR WORK, TAKE CARE OF THINGS AT HOME, OR GET ALONG WITH OTHER PEOPLE: VERY DIFFICULT

## 2025-01-09 ENCOUNTER — OFFICE VISIT (OUTPATIENT)
Dept: FAMILY MEDICINE | Facility: CLINIC | Age: 70
End: 2025-01-09
Payer: COMMERCIAL

## 2025-01-09 VITALS
HEART RATE: 86 BPM | WEIGHT: 138 LBS | OXYGEN SATURATION: 99 % | TEMPERATURE: 98.4 F | SYSTOLIC BLOOD PRESSURE: 136 MMHG | HEIGHT: 69 IN | RESPIRATION RATE: 18 BRPM | DIASTOLIC BLOOD PRESSURE: 64 MMHG | BODY MASS INDEX: 20.44 KG/M2

## 2025-01-09 DIAGNOSIS — I10 ESSENTIAL HYPERTENSION WITH GOAL BLOOD PRESSURE LESS THAN 140/90: ICD-10-CM

## 2025-01-09 DIAGNOSIS — F33.1 MAJOR DEPRESSIVE DISORDER, RECURRENT EPISODE, MODERATE (H): ICD-10-CM

## 2025-01-09 DIAGNOSIS — F41.1 GAD (GENERALIZED ANXIETY DISORDER): Primary | ICD-10-CM

## 2025-01-09 DIAGNOSIS — I95.1 ORTHOSTATIC HYPOTENSION: ICD-10-CM

## 2025-01-09 DIAGNOSIS — F43.23 ADJUSTMENT DISORDER WITH MIXED ANXIETY AND DEPRESSED MOOD: ICD-10-CM

## 2025-01-09 DIAGNOSIS — F51.02 ADJUSTMENT INSOMNIA: ICD-10-CM

## 2025-01-09 LAB
ANION GAP SERPL CALCULATED.3IONS-SCNC: 11 MMOL/L (ref 7–15)
BUN SERPL-MCNC: 22.3 MG/DL (ref 8–23)
CALCIUM SERPL-MCNC: 9.7 MG/DL (ref 8.8–10.4)
CHLORIDE SERPL-SCNC: 101 MMOL/L (ref 98–107)
CREAT SERPL-MCNC: 0.72 MG/DL (ref 0.51–0.95)
CREAT UR-MCNC: 127 MG/DL
EGFRCR SERPLBLD CKD-EPI 2021: 89 ML/MIN/1.73M2
GLUCOSE SERPL-MCNC: 99 MG/DL (ref 70–99)
HCO3 SERPL-SCNC: 26 MMOL/L (ref 22–29)
MICROALBUMIN UR-MCNC: <12 MG/L
MICROALBUMIN/CREAT UR: NORMAL MG/G{CREAT}
POTASSIUM SERPL-SCNC: 4.4 MMOL/L (ref 3.4–5.3)
SODIUM SERPL-SCNC: 138 MMOL/L (ref 135–145)

## 2025-01-09 PROCEDURE — 80048 BASIC METABOLIC PNL TOTAL CA: CPT | Performed by: FAMILY MEDICINE

## 2025-01-09 PROCEDURE — 82043 UR ALBUMIN QUANTITATIVE: CPT | Performed by: FAMILY MEDICINE

## 2025-01-09 PROCEDURE — 82570 ASSAY OF URINE CREATININE: CPT | Performed by: FAMILY MEDICINE

## 2025-01-09 PROCEDURE — 36415 COLL VENOUS BLD VENIPUNCTURE: CPT | Performed by: FAMILY MEDICINE

## 2025-01-09 PROCEDURE — 99215 OFFICE O/P EST HI 40 MIN: CPT | Performed by: FAMILY MEDICINE

## 2025-01-09 PROCEDURE — 96127 BRIEF EMOTIONAL/BEHAV ASSMT: CPT | Performed by: FAMILY MEDICINE

## 2025-01-09 PROCEDURE — G2211 COMPLEX E/M VISIT ADD ON: HCPCS | Performed by: FAMILY MEDICINE

## 2025-01-09 RX ORDER — LISINOPRIL 2.5 MG/1
2.5 TABLET ORAL DAILY PRN
Qty: 90 TABLET | Refills: 3 | Status: SHIPPED | OUTPATIENT
Start: 2025-01-09

## 2025-01-09 ASSESSMENT — COLUMBIA-SUICIDE SEVERITY RATING SCALE - C-SSRS
6. HAVE YOU EVER DONE ANYTHING, STARTED TO DO ANYTHING, OR PREPARED TO DO ANYTHING TO END YOUR LIFE?: NO
2. IN THE PAST MONTH, HAVE YOU ACTUALLY HAD ANY THOUGHTS OF KILLING YOURSELF?: NO
1. WITHIN THE PAST MONTH, HAVE YOU WISHED YOU WERE DEAD OR WISHED YOU COULD GO TO SLEEP AND NOT WAKE UP?: YES

## 2025-01-09 ASSESSMENT — ANXIETY QUESTIONNAIRES: GAD7 TOTAL SCORE: 6

## 2025-01-09 NOTE — PROGRESS NOTES
Assessment & Plan :       ICD-10-CM    1. TIFFANY (generalized anxiety disorder)  F41.1       2. Essential hypertension with goal blood pressure less than 140/90  I10 BASIC METABOLIC PANEL     Albumin Random Urine Quantitative with Creat Ratio     lisinopril (ZESTRIL) 2.5 MG tablet     BASIC METABOLIC PANEL     Albumin Random Urine Quantitative with Creat Ratio      3. Major depressive disorder, recurrent episode, moderate (H)  F33.1       4. Adjustment insomnia - resolved as of 5/5/2023 - off of ambien with psychiatry's okay   F51.02       5. Adjustment disorder with mixed anxiety and depressed mood- mild  at this time  F43.23       6. Orthostatic hypotension- resolved off of lisinopril 2.5mg daily and doing daily 2.25 mile walks with her dog  I95.1           Seeing new therapist , Mica, on 1/15/2025.  Feels good about current plan with ramping up on dosage.  Ok to take tylenol or Ibuprofen for headache, if needed.  Dr. Pompa will be working with Mica re: her treatment plan.     Discussed today staying in the present.    If you have a headache and/or take your blood pressure and it is >135/85, wait 10 minutes and recheck your blood pressure again and if still > 135/85  then take 1 lisinopril 2.5mg tablet.       Return in about 4 weeks (around 2/6/2025) for Wellness/Preventative Visit, depression, anxiety.     Future Appointments 1/9/2025 - 7/8/2025        Date Visit Type Length Department Provider     1/15/2025  4:00 PM ADULT PSYCHOTHERAPY RETURN 60 min [facility] Mica Poole LICSW              [facility]21/2025 11:[facility] 30 min  PSYCHIATRY Judy Pompa DO    Location Instructions:     [facility] has 2 buildings on its campus. Please visit us at [address] [place] [address] [place] and enter the building with the numbers [phone] on it.               1/22/2025 10:00 AM ADULT PSYCHOTHERAPY RETURN 60 min [facility] Mica Poole LICSW              2/6/2025 10:40 AM  ANNUAL WELLNESS 40 min  FAMILY PRACTICE Helga Ibarra MD    Location Instructions:     [facility] is located at [address] [place] [address], along [address] Free parking is available; access the lot by turning north from [address] onto [facility], then west onto [facility].              2/27/2025  1:30 PM MA SCREENING BILATERAL W/ AVERY 15 min [facility] [email or web address]    Location Instructions:     [facility] [address], [address] [place] [address]  Parking Breast Center customers can park in the lot adjacent to the entrance to the [facility].  Entrance and check-in location Enter at the front entrance, under the canopy. Take the stairs or elevator from the main entrance to the 2nd floor. Please check-in for your appointment at the desk in the Breast Center waiting area.  This appointment is in a hospital-based location.&nbsp; Before your visit, you may want to check with your insurance company for coverage and referral options, including cost differences between services provided in different clinic settings.&nbsp; For more information visit this link on the [facility] Website:&nbsp; [email or web address]              3/4/2025 10:45 AM RETURN DERMATOLOGY 15 min OX DERMATOLOGY Ligia Carbajal PA-C    Location Instructions:     [address] [id number]              3/6/2025  1:40 PM CT HEAD WO 20 min RH CT SCAN RSCC [id number]    Location Instructions:     [facility] [address] [place] [address]  Parking Imaging customers can park either in the lot to the right side of the driveway (opposite the parking ramp) as you approach the Specialty Care Center, or in the parking ramp.  Entrance and check-in location Enter at the front entrance (pictured to the right). As you enter the lobby,  the Imaging Center is on the first floor, just past the elevators. Please check-in for your appointment at the desk in the Imaging Center waiting area, [address]  This appointment is in a hospital-based location.&nbsp; Before your visit, you may want to check with your insurance company for coverage and referral options, including cost differences between services provided in different clinic settings.&nbsp; For more information visit this link on the [facility] Website:&nbsp; [email or web address]                      Depression Screening Follow Up:         1/8/2025    11:26 AM   PHQ   PHQ-9 Total Score 11    Q9: Thoughts of better off dead/self-harm past 2 weeks Several days   F/U: Thoughts of suicide or self-harm No   F/U: Safety concerns No       Patient-reported     MEDICATIONS:   Orders Placed This Encounter   Medications    lisinopril (ZESTRIL) 2.5 MG tablet     Sig: Take 1 tablet (2.5 mg) by mouth daily as needed (BP >135/85).     Dispense:  90 tablet     Refill:  3          - Continue other medications without change  Regular exercise  See Patient Instructions    46 minutes spent by me on the date of the encounter doing chart review, history and exam, documentation and further activities per the note .  44 minutes on face to face time.      Helga Ibarra MD            Chanelle Tristan is a 69 year old, presenting for the following health issues:  MH Follow Up  and the following other medical problems:      1. TIFFANY (generalized anxiety disorder)    2. Essential hypertension with goal blood pressure less than 140/90    3. Major depressive disorder, recurrent episode, moderate (H)    4. Adjustment insomnia - resolved as of 5/5/2023 - off of ambien with psychiatry's okay     5. Adjustment disorder with mixed anxiety and depressed mood- mild  at this time    6. Orthostatic hypotension- resolved off of lisinopril 2.5mg daily and doing daily 2.25 mile walks with her dog            1/9/2025     "10:24 AM   Additional Questions   Roomed by karolina alejo   Accompanied by self     History of Present Illness       Mental Health Follow-up:  Patient presents to follow-up on Depression & Anxiety.Patient's depression since last visit has been:  Medium  The patient is having other symptoms associated with depression.  Patient's anxiety since last visit has been:  Medium  The patient is having other symptoms associated with anxiety.  Any significant life events: No  Patient is feeling anxious or having panic attacks.  Patient has no concerns about alcohol or drug use.    She eats 2-3 servings of fruits and vegetables daily.She consumes 0 sweetened beverage(s) daily.She exercises with enough effort to increase her heart rate 10 to 19 minutes per day.  She exercises with enough effort to increase her heart rate 4 days per week.   She is taking medications regularly.       Depression and Anxiety - having some nausea, diarrhea, and headaches with sertraline change from venlafaxine.  Started on 25mg sertraline 1/2 of a 50mg tablet.   Had a couple of days, \"when she was pretty clear.\"  Mon-Wed . Today is Thursday.     How are you doing with your depression since your last visit? Worsened   How are you doing with your anxiety since your last visit?  No change  Are you having other symptoms that might be associated with depression or anxiety? Yes:  my head feels like cotton . Having  with for support with detox of medication  Have you had a significant life event? No   Do you have any concerns with your use of alcohol or other drugs? No    Has been isolating self a little bit more at home. Worried about driving.  Hasn't left the house on her own in several weeks. Sleep is on and off - some nights sleep = normal 10pm to 5am sleeping through the night.  Sometimes awakens during the night to urinate, then goes back to sleep.   Sometimes in the afternoons does a meditation on the CALM lorena.     Had some very passive thoughts " "of \"not waking up\" last week.  No active suicidal thoughts or plan at all.  Seeing therapist weekly.         Social History     Tobacco Use    Smoking status: Never     Passive exposure: Never    Smokeless tobacco: Never   Vaping Use    Vaping status: Never Used   Substance Use Topics    Alcohol use: Yes     Alcohol/week: 0.0 - 1.0 standard drinks of alcohol     Comment: 2 per month    Drug use: No     Comment: no herbal meds either         12/17/2024     2:23 PM 1/1/2025    11:51 AM 1/8/2025    11:26 AM   PHQ   PHQ-9 Total Score 10  8  11    Q9: Thoughts of better off dead/self-harm past 2 weeks Not at all Several days Several days   F/U: Thoughts of suicide or self-harm  No No   F/U: Safety concerns  No No       Patient-reported         12/17/2024     2:25 PM 1/6/2025     8:49 AM 1/8/2025    11:28 AM   TIFFANY-7 SCORE   Total Score 8 (mild anxiety) 9 (mild anxiety) 6 (mild anxiety)   Total Score 8  9  6        Patient-reported         1/8/2025    11:26 AM   Last PHQ-9   1.  Little interest or pleasure in doing things 3   2.  Feeling down, depressed, or hopeless 1   3.  Trouble falling or staying asleep, or sleeping too much 0   4.  Feeling tired or having little energy 2   5.  Poor appetite or overeating 1   6.  Feeling bad about yourself 0   7.  Trouble concentrating 2   8.  Moving slowly or restless 1   Q9: Thoughts of better off dead/self-harm past 2 weeks 1   PHQ-9 Total Score 11    In the past two weeks have you had thoughts of suicide or self harm? No   Do you have concerns about your personal safety or the safety of others? No       Patient-reported         1/8/2025    11:28 AM   TIFFANY-7    1. Feeling nervous, anxious, or on edge 3   2. Not being able to stop or control worrying 1   3. Worrying too much about different things 1   4. Trouble relaxing 1   5. Being so restless that it is hard to sit still 0   6. Becoming easily annoyed or irritable 0   7. Feeling afraid, as if something awful might happen 0   TIFFANY-7 " "Total Score 6    If you checked any problems, how difficult have they made it for you to do your work, take care of things at home, or get along with other people? Not difficult at all       Patient-reported         1/9/2025    11:25 AM   C-SSRS (Brief [place])   Within the last month, have you wished you were dead or wished you could go to sleep and not wake up? Yes   Within the last month, have you had any actual thoughts of killing yourself? No   Within the last month, have you ever done anything, started to do anything, or prepared to do anything to end your life? No       Follow Up Actions Taken  Crisis resource information provided in the After Visit Summary  Scheduled appointment with [facility]     Discussed the following ways the patient can remain in a safe environment:  be around others- they do not have guns in the home. Pt states she would never harm herself. \"Have too much to live for with the kids.\"  - her children and grandchildren.     Patient Active Problem List   Diagnosis    Essential hypertension with goal blood pressure less than 140/90 - then Orthostatic hypotension- resolved off of lisinopril 2.5mg daily and doing daily 2.25 mile walks with her dog    Hidradenitis    Localized osteoarthritis of hand    Synovial cyst of popliteal space    DERMATOPHYTOSIS OF NAIL- toenails     Lipidosis    Osteopenia    Postmenopausal    NUMBNESS AND TINGLING OF FOOT - bilateral -mild     Numbness of feet- distal feet R>L     Stress incontinence, female - mild    Cough    Acute bronchitis- recurrent- ? related to mold in school building- pt has since retired and no further bronchitis    Rosacea    History of migraine headaches- assoc. with nausea/vomiting - resolved around menopause - were  premenstrual     Dupuytren's disease of palm - right 4th digit flexor tendon    Sun-damaged skin    Seasonal allergic rhinitis    Basal cell carcinoma of leg, right    Adjustment disorder with mixed anxiety and depressed mood- " mild  at this time    Family history of celiac disease    TIFFANY (generalized anxiety disorder)    Insomnia, unspecified type    Chronic constipation    Raynaud's phenomenon without gangrene    Other secondary osteoarthritis of left knee    Primary localized osteoarthrosis of left lower leg    Hyperlipidemia LDL goal <130    Basal cell carcinoma    Colon polyps    Screening for cervical cancer    Adjustment insomnia - resolved as of 5/5/2023 - off of ambien with psychiatry's okay     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 - much improved - resolved as of 5/5/2023     Drug-induced parkinsonism (H)- Abilify - generic = ARIPiprazole( 5mg) - RESOLVED OFF ABILIFY     Mood disorder    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 - lower pretibial area     Bilateral lower extremity edema- trace pitting edema distal LE to mid pretibial areas Left > right    Tremor    Major depressive disorder, recurrent, mild    Orthostatic hypotension- resolved off of lisinopril 2.5mg daily and doing daily 2.25 mile walks with her dog    Moderate recurrent major depression (H)       Current Outpatient Medications   Medication Sig Dispense Refill    clindamycin (CLEOCIN T) 1 % external lotion Apply topically 2 times daily 60 mL 3    lactobacillus rhamnosus, GG, (CULTURELL) capsule Take 1 capsule by mouth daily Taking every other day      lisinopril (ZESTRIL) 2.5 MG tablet Take 1 tablet (2.5 mg) by mouth daily as needed (BP >135/85). 90 tablet 3    traZODone (DESYREL) 50 MG tablet Take 0.5-1 tablets (25-50 mg) by mouth nightly as needed for sleep. 90 tablet 1    propranolol ER (INDERAL LA) 60 MG 24 hr capsule TAKE ONE CAPSULE BY MOUTH ONCE DAILY 90 capsule 3    sertraline (ZOLOFT) 100 MG tablet Take 1 tablet (100 mg) by mouth daily. 90 tablet 0          Allergies   Allergen Reactions    Abilify [Aripiprazole] Other (See Comments)      "Suspected drug-induced Parkinsonism     Fosamax Diarrhea     Diarrhea, stomach cramps, jaw pain     Amoxicillin-Pot Clavulanate Itching and Rash    Amoxicillin-Pot Clavulanate Itching and Rash              Review of Systems  Constitutional, HEENT, cardiovascular, pulmonary, GI, , musculoskeletal, neuro, skin, endocrine and psych systems are negative, except as otherwise noted.      Objective    BP (!) 148/92   Pulse 86   Temp 98.4  F (36.9  C) (Tympanic)   Resp 18   Ht 1.765 m (5' 9.49\")   Wt 62.6 kg (138 lb)   LMP 06/28/2001   SpO2 99%   BMI 20.09 kg/m    Body mass index is 20.09 kg/m .  Physical Exam   GENERAL: alert and no distress  EYES: Eyes grossly normal to inspection, PERRL and conjunctivae and sclerae normal  HENT: ear canals and TM's normal, nose and mouth without ulcers or lesions  NECK: no adenopathy, no asymmetry, masses, or scars  RESP: lungs clear to auscultation - no rales, rhonchi or wheezes  CV: regular rate and rhythm, normal S1 S2, no S3 or S4, no murmur, click or rub, no peripheral edema  ABDOMEN: soft, nontender, no hepatosplenomegaly, no masses and bowel sounds normal  MS: no gross musculoskeletal defects noted, no edema  SKIN: no suspicious lesions or rashes  NEURO: Normal strength and tone, mentation intact and speech normal  PSYCH: mentation appears slightly duller than normal, affect slightly blunted today, but Alert and oriented. No acute distress. Appears well-groomed and casually dressed. Affect is slightly  depressed. In good humor though and laughs appropriately. Not particularly anxious. No evidence of psychosis.     Office Visit on 02/05/2024   Component Date Value Ref Range Status    Creatinine Urine mg/dL 02/05/2024 52.4  mg/dL Final    The reference ranges have not been established in urine creatinine. The results should be integrated into the clinical context for interpretation.    Albumin Urine mg/L 02/05/2024 <12.0  mg/L Final    The reference ranges have not been " established in urine albumin. The results should be integrated into the clinical context for interpretation.    Albumin Urine mg/g Cr 02/05/2024    Final    Unable to calculate, urine albumin and/or urine creatinine is outside detectable limits.  Microalbuminuria is defined as an albumin:creatinine ratio of 17 to 299 for males and 25 to 299 for females. A ratio of albumin:creatinine of 300 or higher is indicative of overt proteinuria.  Due to biologic variability, positive results should be confirmed by a second, first-morning random or 24-hour timed urine specimen. If there is discrepancy, a third specimen is recommended. When 2 out of 3 results are in the microalbuminuria range, this is evidence for incipient nephropathy and warrants increased efforts at glucose control, blood pressure control, and institution of therapy with an angiotensin-converting-enzyme (ACE) inhibitor (if the patient can tolerate it).      TSH 02/05/2024 1.26  0.30 - 4.20 uIU/mL Final    Cholesterol 02/05/2024 203 (H)  <200 mg/dL Final    Triglycerides 02/05/2024 67  <150 mg/dL Final    Direct Measure HDL 02/05/2024 74  >=50 mg/dL Final    LDL Cholesterol Calculated 02/05/2024 116 (H)  <=100 mg/dL Final    Non HDL Cholesterol 02/05/2024 129  <130 mg/dL Final    Patient Fasting > 8hrs? 02/05/2024 Yes   Final    Sodium 02/05/2024 138  135 - 145 mmol/L Final    Reference intervals for this test were updated on 09/26/2023 to more accurately reflect our healthy population. There may be differences in the flagging of prior results with similar values performed with this method. Interpretation of those prior results can be made in the context of the updated reference intervals.     Potassium 02/05/2024 4.7  3.4 - 5.3 mmol/L Final    Carbon Dioxide (CO2) 02/05/2024 27  22 - 29 mmol/L Final    Anion Gap 02/05/2024 9  7 - 15 mmol/L Final    Urea Nitrogen 02/05/2024 17.4  8.0 - 23.0 mg/dL Final    Creatinine 02/05/2024 0.68  0.51 - 0.95 mg/dL Final     GFR Estimate 02/05/2024 >90  >60 mL/min/1.73m2 Final    Calcium 02/05/2024 9.9  8.8 - 10.2 mg/dL Final    Chloride 02/05/2024 102  98 - 107 mmol/L Final    Glucose 02/05/2024 92  70 - 99 mg/dL Final    Alkaline Phosphatase 02/05/2024 75  40 - 150 U/L Final    Reference intervals for this test were updated on 11/14/2023 to more accurately reflect our healthy population. There may be differences in the flagging of prior results with similar values performed with this method. Interpretation of those prior results can be made in the context of the updated reference intervals.    AST 02/05/2024 19  0 - 45 U/L Final    Reference intervals for this test were updated on 6/12/2023 to more accurately reflect our healthy population. There may be differences in the flagging of prior results with similar values performed with this method. Interpretation of those prior results can be made in the context of the updated reference intervals.    ALT 02/05/2024 16  0 - 50 U/L Final    Reference intervals for this test were updated on 6/12/2023 to more accurately reflect our healthy population. There may be differences in the flagging of prior results with similar values performed with this method. Interpretation of those prior results can be made in the context of the updated reference intervals.      Protein Total 02/05/2024 7.2  6.4 - 8.3 g/dL Final    Albumin 02/05/2024 4.4  3.5 - 5.2 g/dL Final    Bilirubin Total 02/05/2024 0.7  <=1.2 mg/dL Final    WBC Count 02/05/2024 8.7  4.0 - 11.0 10e3/uL Final    RBC Count 02/05/2024 4.44  3.80 - 5.20 10e6/uL Final    Hemoglobin 02/05/2024 13.1  11.7 - 15.7 g/dL Final    Hematocrit 02/05/2024 40.8  35.0 - 47.0 % Final    MCV 02/05/2024 92  78 - 100 fL Final    MCH 02/05/2024 29.5  26.5 - 33.0 pg Final    MCHC 02/05/2024 32.1  31.5 - 36.5 g/dL Final    RDW 02/05/2024 13.1  10.0 - 15.0 % Final    Platelet Count 02/05/2024 233  150 - 450 10e3/uL Final    25 OH Vitamin D2 02/05/2024 <5  ug/L  Final    25 OH Vitamin D3 02/05/2024 32  ug/L Final    25 OH Vit D Total 02/05/2024 <37  20 - 75 ug/L Final    Season, race, dietary intake, and treatment affect the concentration of 25-hydroxy-Vitamin D. Values may decrease during winter months and increase during summer months. Values 20-29 ug/L may indicate Vitamin D insufficiency and values <20 ug/L may indicate Vitamin D deficiency.           Signed Electronically by: Helga Ibarra MD

## 2025-01-15 ENCOUNTER — VIRTUAL VISIT (OUTPATIENT)
Dept: PSYCHOLOGY | Facility: CLINIC | Age: 70
End: 2025-01-15
Payer: COMMERCIAL

## 2025-01-15 DIAGNOSIS — F41.1 GENERALIZED ANXIETY DISORDER: ICD-10-CM

## 2025-01-15 DIAGNOSIS — G47.00 INSOMNIA, UNSPECIFIED TYPE: ICD-10-CM

## 2025-01-15 PROCEDURE — 90837 PSYTX W PT 60 MINUTES: CPT | Mod: 95

## 2025-01-15 NOTE — PROGRESS NOTES
[facility]                                     Progress Note    Patient Name: Jolynn Haji  Date: 1/15/2025         Service Type: Individual      Session Start Time: 4:00 pm  Session End Time: 4:53 pm     Session Length: 53 min    Session #: 1 (previous therapy with Erika RAIN)    Attendees: Client attended alone    Service Modality:  Video Visit:      Provider verified identity through the following two step process.  Patient provided:  Patient address    Telemedicine Visit: The patient's condition can be safely assessed and treated via synchronous audio and visual telemedicine encounter.      Reason for Telemedicine Visit: Patient convenience (e.g. access to timely appointments / distance to available provider)    Originating Site (Patient Location): Patient's home    Distant Site (Provider Location): Provider Remote Setting- Home Office    Consent:  The patient/guardian has verbally consented to: the potential risks and benefits of telemedicine (video visit) versus in person care; bill my insurance or make self-payment for services provided; and responsibility for payment of non-covered services.     Patient would like the video invitation sent by:  My Chart    Mode of Communication:  [facility]    Distant Location (Provider):  Off-site    As the provider I attest to compliance with applicable laws and regulations related to telemedicine.    DATA  Interactive Complexity: No  Crisis: No        Progress Since Last Session (Related to Symptoms / Goals / Homework):   Symptoms: No change first session    Homework: Did not complete      Episode of Care Goals: No improvement - PRECONTEMPLATION (Not seeing need for change); Intervened by educating the patient about the effects of current behavior on health.  Evoked information about reasons to continue behavior, express concern / recommendations, and explored any change talk     Current / Ongoing Stressors and Concerns:   Current  functioning, cognitive questions, overwhelm     Treatment Objective(s) Addressed in This Session:   Identify stressors       Intervention:   Interpersonal Therapy: provided active listening and validation of experience with stressors. Surfaced areas of odessa in life. Utilized rapport building    Assessments completed prior to visit:  The following assessments were completed by patient for this visit:  PHQ2:       5/3/2023     1:18 PM 3/21/2023    11:20 AM 2/23/2023     7:15 PM 1/31/2023     3:21 PM 1/12/2023     4:28 PM 12/15/2022     9:12 PM 12/8/2022     9:38 AM   PHQ-2 ( 1999 [employer])   Q1: Little interest or pleasure in doing things   0 0 0  0   Q2: Feeling down, depressed or hopeless   0 0 0  0   PHQ-2 Score   0 0 0  0   Q1: Little interest or pleasure in doing things   Not at all Not at all Not at all  Not at all   Q2: Feeling down, depressed or hopeless   Not at all Not at all Not at all  Not at all   PHQ-2 Score Incomplete Incomplete 0 0 0 Incomplete 0     PHQ9:       11/18/2024     9:16 AM 12/2/2024     5:16 PM 12/10/2024     5:27 PM 12/12/2024     1:09 PM 12/17/2024     2:23 PM 1/1/2025    11:51 AM 1/8/2025    11:26 AM   PHQ-9 SCORE   PHQ-9 Total Score MyChart 1 (Minimal depression) 2 (Minimal depression) 2 (Minimal depression)  10 (Moderate depression) 8 (Mild depression) 11 (Moderate depression)   PHQ-9 Total Score 1  2  2  12 10  8  11        Patient-reported     GAD2:       7/29/2024    11:49 AM 9/8/2024     6:03 PM 9/23/2024     3:00 PM 10/8/2024     4:08 PM 11/18/2024     9:17 AM 12/2/2024     5:19 PM 12/28/2024     3:34 PM   TIFFANY-2   Feeling nervous, anxious, or on edge 1 1 1 1 1 2 3   Not being able to stop or control worrying 0 0 0 0 0 1 2   TIFFANY-2 Total Score 1 1 1    1 1 1  3  5        Patient-reported     GAD7:       10/12/2024     5:12 PM 10/14/2024    11:11 AM 12/2/2024     5:19 PM 12/12/2024     1:09 PM 12/17/2024     2:25 PM 1/6/2025     8:49 AM 1/8/2025    11:28 AM   TIFFNAY-7 SCORE   Total Score 1  (minimal anxiety) 3 (minimal anxiety) 2 (minimal anxiety)  8 (mild anxiety) 9 (mild anxiety) 6 (mild anxiety)   Total Score 1 3 2  5 8  9  6        Patient-reported     CAGE-AID:       6/6/2017    12:05 PM 5/12/2022     2:26 PM   CAGE-AID Total Score   Total Score  0    0   Total Score MyChart  0 (A total score of 2 or greater is considered clinically significant)       Information is confidential and restricted. Go to Review Flowsheets to unlock data.     PROMIS 10-Global Health (only subscores and total score):       2/25/2024     2:04 PM 5/27/2024     7:01 PM 6/9/2024     8:48 PM 6/23/2024     8:01 PM 9/23/2024     9:33 AM 10/8/2024     4:11 PM 12/28/2024     3:39 PM   PROMIS-10 Scores Only   Global Mental Health Score 17 17 17 17    17 17    17    17 18 12    Global Physical Health Score 17 19 19 19    19 18    18    18 17 12    PROMIS TOTAL - SUBSCORES 34 36 36 36    36 35    35    35 35 24        Patient-reported     [place] Suicide Severity Rating Scale (Lifetime/Recent)      5/12/2022    10:07 PM 9/27/2022     2:16 PM 2/28/2024    10:11 AM   [place] Suicide Severity Rating (Lifetime/Recent)   Q1 Wish to be Dead (Lifetime) N Y Y   Wish to be Dead Description (Lifetime)  fleeting thought of wishing she were dead    1. Wish to be Dead (Past 1 Month)  N N   Q2 Non-Specific Active Suicidal Thoughts (Lifetime) N N N   Most Severe Ideation Rating (Lifetime)  1 1   Most Severe Ideation Rating (Past 1 Month)  1 1   Frequency (Lifetime)  1 1   Frequency (Past 1 Month)  1    Duration (Lifetime)  1 1   Duration (Past 1 Month)  1    Controllability (Lifetime)  1    Controllability (Past 1 Month)  1    Deterrents (Lifetime)  1    Deterrents (Past 1 Month)  1    Reasons for Ideation (Lifetime)  4 5   Reasons for Ideation (Past 1 Month)  4 0   Actual Attempt (Lifetime) N N N   Has subject engaged in non-suicidal self-injurious behavior? (Lifetime) N N N   Interrupted Attempts (Lifetime) N N N   Aborted or Self-Interrupted  Attempt (Lifetime) N N N   Preparatory Acts or Behavior (Lifetime) N N N   Calculated C-SSRS Risk Score (Lifetime/Recent) No Risk Indicated No Risk Indicated No Risk Indicated         ASSESSMENT: Current Emotional / Mental Status (status of significant symptoms):   Risk status (Self / Other harm or suicidal ideation)   Patient denies current fears or concerns for personal safety.   Patient denies current or recent suicidal ideation or behaviors.   Patient denies current or recent homicidal ideation or behaviors.   Patient denies current or recent self injurious behavior or ideation.   Patient denies other safety concerns.   Patient reports there has been no change in risk factors since their last session.     Patient reports there has been no change in protective factors since their last session.     Recommended that patient call 911 or go to the local ED should there be a change in any of these risk factors     Appearance:   Appropriate    Eye Contact:   Fair    Psychomotor Behavior: Normal  Restless    Attitude:   Cooperative    Orientation:   All   Speech    Rate / Production: Hyperverbal  Normal     Volume:  Normal    Mood:    Anxious  Normal   Affect:    Appropriate    Thought Content:  Clear    Thought Form:  Coherent  Logical  Circumstantial   Insight:    Fair      Medication Review:   No changes to current psychiatric medication(s)     Medication Compliance:   Yes     Changes in Health Issues:   None reported     Chemical Use Review:   Substance Use: Chemical use reviewed, no active concerns identified      Tobacco Use: No current tobacco use.      Diagnosis:  1. Insomnia, unspecified type    2. Generalized anxiety disorder        Collateral Reports Completed:   Not Applicable    PLAN: (Patient Tasks / Therapist Tasks / Other)  Attend to mindfulness grounding practices        DINESH Campbell  1/15/24                                                          ______________________________________________________________________

## 2025-01-21 ENCOUNTER — VIRTUAL VISIT (OUTPATIENT)
Dept: PSYCHIATRY | Facility: CLINIC | Age: 70
End: 2025-01-21
Payer: COMMERCIAL

## 2025-01-21 DIAGNOSIS — F39 MOOD DISORDER: ICD-10-CM

## 2025-01-21 DIAGNOSIS — G47.00 INSOMNIA, UNSPECIFIED TYPE: ICD-10-CM

## 2025-01-21 DIAGNOSIS — F41.1 GAD (GENERALIZED ANXIETY DISORDER): Primary | ICD-10-CM

## 2025-01-21 PROCEDURE — G2211 COMPLEX E/M VISIT ADD ON: HCPCS | Performed by: PSYCHIATRY & NEUROLOGY

## 2025-01-21 PROCEDURE — 98006 SYNCH AUDIO-VIDEO EST MOD 30: CPT | Performed by: PSYCHIATRY & NEUROLOGY

## 2025-01-21 RX ORDER — SERTRALINE HYDROCHLORIDE 100 MG/1
100 TABLET, FILM COATED ORAL DAILY
Qty: 90 TABLET | Refills: 0 | Status: SHIPPED | OUTPATIENT
Start: 2025-01-21

## 2025-01-21 ASSESSMENT — PAIN SCALES - GENERAL: PAINLEVEL_OUTOF10: NO PAIN (0)

## 2025-01-21 NOTE — PROGRESS NOTES
"Virtual Visit Details    Type of service:  Video Visit   Video Start Time: {video visit start/end time for provider to select:384911}  Video End Time:{video visit start/end time for provider to select:586109}    Originating Location (pt. Location): {video visit patient location:616921::\"Home\"}  {PROVIDER LOCATION On-site should be selected for visits conducted from your clinic location or adjoining [facility] hospital, academic office, or other nearby [facility] building. Off-site should be selected for all other provider locations, including home:936541}  Distant Location (provider location):  {virtual location provider:902069}  Platform used for Video Visit: {Virtual Visit Platforms:063866::\"Clear Books\"}  "

## 2025-01-21 NOTE — PATIENT INSTRUCTIONS
Treatment Plan:   Continue trazodone 12.5-50 mg at bedtime as needed for sleep  Continue propranolol EXTENDED RELEASE 60 mg ONCE daily for anxiety, tremor, headaches.  Make the following medication changes:  Days 1-5  Decrease venlafaxine/Effexor-XR to 75 mg daily  Start sertraline/Zoloft 25 mg daily  Days 6-15  Decrease venlafaxine/Effexor-XR to 37.5 mg daily  Increase sertraline to 50 mg daily  Days 16-25  Decrease venlafaxine/Effexor-XR to 37.5 mg once EVERY OTHER day  Increase sertraline/Zoloft to 75 mg daily  Days 26 and on  STOP venlafaxine/Effexor-XR   Increase sertraline/Zoloft to 100 mg daily as tolerated (or ok to stay at 75 mg dose)  Schedule follow-up with you neurologist, Dr. Amato. Can call [phone] to get scheduled.   Schedule and complete CT scan of head/brain. Someone from CT/imaging department should call to get you scheduled for a CT scan of your brain.   Continue all other cares per primary care provider.   Continue all other medications as reviewed per electronic medical record today.   Safety plan reviewed. To the Emergency Department as needed or call after hours crisis line at [phone] or [phone]. [place] Crisis Text Line. Text [place] to 006781 or Suicide LifeLine Chat: suicidepreventionlifeline.org/chat  Continue individual psychotherapy for additional support and ongoing development of nonpharmacologic coping skills and strategies.  Schedule an appointment with me in 4-6 weeks or sooner as needed. Call [facility] at [phone] to schedule.  Follow up with primary care provider as planned or for acute medical concerns.  Call the psychiatric nurse line with medication questions or concerns at [phone].  MyChart may be used to communicate with your provider, but this is not intended to be used for emergencies.

## 2025-01-21 NOTE — NURSING NOTE
Current patient location: [address] [place] [address] [address]  [facility] [address]    Is the patient currently in the state of [place]? YES    Visit mode: VIDEO    If the visit is dropped, the patient can be reconnected by:VIDEO VISIT: Text to cell phone:   Telephone Information:   Mobile [phone]    and VIDEO VISIT: Send to e-mail at: [email or web address]    Will anyone else be joining the visit? NO  (If patient encounters technical issues they should call [phone] :[phone])    Are changes needed to the allergy or medication list? No    Are refills needed on medications prescribed by this physician? Discuss with provider    Rooming Documentation:  Questionnaire(s) completed    Reason for visit: RECHECK    Rosa QUEEN

## 2025-01-21 NOTE — PROGRESS NOTES
"Telemedicine Visit: The patient's condition can be safely assessed and treated via synchronous audio and visual telemedicine encounter.      Reason for Telemedicine Visit: Patient has requested telehealth visit    Originating Site (Patient Location): Patient's home    Distant Location (provider location): Off-Site    Consent:  The patient/guardian has verbally consented to: the potential risks and benefits of telemedicine (video visit) versus in person care; bill my insurance or make self-payment for services provided; and responsibility for payment of non-covered services.     Mode of Communication:  Video Conference via [employer]    As the provider I attest to compliance with applicable laws and regulations related to telemedicine.        Outpatient Psychiatric Progress Note    Name: Jolynn Haji   : 1955                    Primary Care Provider: Helga Ibarra MD   Therapist: DINESH Campbell    PHQ-9 scores:      2024     2:23 PM 2025    11:51 AM 2025    11:26 AM   PHQ-9 SCORE   PHQ-9 Total Score MyChart 10 (Moderate depression) 8 (Mild depression) 11 (Moderate depression)   PHQ-9 Total Score 10  8  11        Patient-reported       TIFFANY-7 scores:      2024     2:25 PM 2025     8:49 AM 2025    11:28 AM   TIFFANY-7 SCORE   Total Score 8 (mild anxiety) 9 (mild anxiety) 6 (mild anxiety)   Total Score 8  9  6        Patient-reported       Patient Identification:  Patient is a 69 year old,   White Choose not to answer female  who presents for return visit with me.  Patient is currently retired. Patient attended the phone/video session with daughter aMlini. Patient prefers to be called: \" Azalea\".    Interim History:  I last saw Jolynn Haji for outpatient psychiatry return visit on 2025 . During that appointment, we:  Continue trazodone 12.5-50 mg at bedtime as needed for sleep  Continue propranolol EXTENDED RELEASE 60 mg ONCE daily for anxiety, tremor, " "headaches.  Make the following medication changes:  Days 1-5  Decrease venlafaxine/Effexor-XR to 75 mg daily  Start sertraline/Zoloft 25 mg daily  Days 6-15  Decrease venlafaxine/Effexor-XR to 37.5 mg daily  Increase sertraline to 50 mg daily  Days 16-25  Decrease venlafaxine/Effexor-XR to 37.5 mg once EVERY OTHER day  Increase sertraline/Zoloft to 75 mg daily  Days 26 and on  STOP venlafaxine/Effexor-XR   Increase sertraline/Zoloft to 100 mg daily as tolerated (or ok to stay at 75 mg dose)  Schedule follow-up with you neurologist, Dr. Amato. Can call [phone] to get scheduled.   Schedule and complete CT scan of head/brain. Someone from CT/imaging department should call to get you scheduled for a CT scan of your brain.     1/21: Patient overall with some \"better days\" compared to how she had been feeling.  Still not where she would like to be due to ongoing high anxiety.  Sleeping well.  Continues on trazodone 25 mg at bedtime.  Is currently on day 20 of the above cross taper-titration plan.  Patient is scheduled with neurologist in April.  Family remains supportive.  No major negative side effects from medication changes at this point.  Couple bouts of loose stools and a little bit of mild nausea at that is currently quite manageable.  No acute safety concerns.  No suicidality.  No problematic drug or alcohol use.    Per neurology note 5/2023:  Your neurologic exam and recent testing looks reassuring in terms of not having evidence of an emerging neurologic condition.     Feel free to follow up with me any time in the future if there is increase in tremor, increased stiffness/slowness of movement, or any other concerns.     Call  with questions.     Silverio Amato MD     Past Psychiatric Med Trials:  Psych Meds at Intake:  lexapro 20 mg daily  abilify 5 mg daily      Past Psych Meds:  Amitriptyline  Quetiapine  Mirtazapine - stopped \"because I was sleeping through the night;\" hallucinations "     Psychiatric ROS:  Jolynn Haji reports mood has been: See HPI above  Anxiety has been: See HPI above  Sleep has been: Sleeping ok with trazodone  Seema sxs: None  Psychosis sxs: N/A  ADHD/ADD sxs: N/A  PTSD sxs: N/A  PHQ9 and GAD7 scores were reviewed today if completed.   Medication side effects: Mild nausea-manageable  Current stressors include: Symptoms and See HPI above  Coping mechanisms and supports include: Family, Hobbies and Friends, therapy    Current medications include:   Current Outpatient Medications   Medication Sig Dispense Refill    clindamycin (CLEOCIN T) 1 % external lotion Apply topically 2 times daily 60 mL 3    lactobacillus rhamnosus, GG, (CULTURELL) capsule Take 1 capsule by mouth daily Taking every other day      lisinopril (ZESTRIL) 2.5 MG tablet Take 1 tablet (2.5 mg) by mouth daily as needed (BP >135/85). 90 tablet 3    propranolol (INDERAL) 10 MG tablet Take 1 tablet (10 mg) by mouth every 8 hours as needed (panic or severe anxiety feelings). 90 tablet 3    propranolol ER (INDERAL LA) 60 MG 24 hr capsule Take 1 capsule (60 mg) by mouth daily 90 capsule 3    sertraline (ZOLOFT) 50 MG tablet Take 0.5 tablets (25 mg) by mouth daily for 5 days, THEN 1 tablet (50 mg) daily for 10 days, THEN 1.5 tablets (75 mg) daily for 10 days, THEN 2 tablets (100 mg) daily. 88 tablet 0    traZODone (DESYREL) 50 MG tablet Take 0.5-1 tablets (25-50 mg) by mouth nightly as needed for sleep. 90 tablet 1     Current Facility-Administered Medications   Medication Dose Route Frequency Provider Last Rate Last Admin    lidocaine 1 % 4 mL  4 mL INTRA-ARTICULAR Once Helga Ibarra MD        lidocaine 1 % injection 3 mL  3 mL   Giovany Quiroz MD   3 mL at 07/01/22 1120    lidocaine 1 % injection 4 mL  4 mL   Giovany Quiroz MD   4 mL at 07/01/22 1120       Past Medical/Surgical History:  Past Medical History:   Diagnosis Date    Abnormal glandular Papanicolaou smear of cervix- ASCUS in 2000 12/5/2003     ASC H- 2000    Basal cell carcinoma of leg, right 05/2016    Dr Rogers    Colon polyps 05/2018    tubular adenoam - due 5 yrs - 2 mm    Concussion without loss of consciousness 4/12/2017    Hidradenitis     left  groin    HSIL (high grade squamous intraepithelial lesion) on Pap smear of cervix 7/5/2000    Normal paps from 2001 to 2016/cc    Hypertension goal BP (blood pressure) < 140/90     OA (osteoarthritis) of knee     moderate    Postmenopausal since 7/2008     Shingles 7/23/2010    left     Squamous cell carcinoma of skin, unspecified     Synovial cyst of popliteal space       has a past medical history of Abnormal glandular Papanicolaou smear of cervix- ASCUS in 2000 (12/5/2003), Basal cell carcinoma of leg, right (05/2016), Colon polyps (05/2018), Concussion without loss of consciousness (4/12/2017), Hidradenitis, HSIL (high grade squamous intraepithelial lesion) on Pap smear of cervix (7/5/2000), Hypertension goal BP (blood pressure) < 140/90, OA (osteoarthritis) of knee, Postmenopausal (since 7/2008 ), Shingles (7/23/2010), Squamous cell carcinoma of skin, unspecified, and Synovial cyst of popliteal space.    She has no past medical history of Malignant melanoma (H) or Squamous cell carcinoma.    Social History:  Reviewed. No changes to social history except as noted above in HPI.    Vital Signs:   None. This is phone/video visit.     Labs:  Most recent laboratory results reviewed and the pertinent results include:   TSH   Date Value Ref Range Status   02/05/2024 1.26 0.30 - 4.20 uIU/mL Final   04/25/2022 1.30 0.40 - 4.00 mU/L Final   01/13/2021 2.07 0.40 - 4.00 mU/L Final     Lab Results   Component Value Date    WBC 8.1 04/25/2022    WBC 6.4 01/13/2021     Lab Results   Component Value Date    RBC 4.58 04/25/2022    RBC 4.54 01/13/2021     Lab Results   Component Value Date    HGB 13.6 04/25/2022    HGB 13.3 01/13/2021     Lab Results   Component Value Date    HCT 41.1 04/25/2022    HCT 41.7  01/13/2021     No components found for: MCT  Lab Results   Component Value Date    MCV 90 04/25/2022    MCV 92 01/13/2021     Lab Results   Component Value Date    MCH 29.7 04/25/2022    MCH 29.3 01/13/2021     Lab Results   Component Value Date    MCHC 33.1 04/25/2022    MCHC 31.9 01/13/2021     Lab Results   Component Value Date    RDW 12.9 04/25/2022    RDW 13.1 01/13/2021     Lab Results   Component Value Date     04/25/2022     01/13/2021     Last Comprehensive Metabolic Panel:  Sodium   Date Value Ref Range Status   01/09/2025 138 135 - 145 mmol/L Final   01/13/2021 140 133 - 144 mmol/L Final     Potassium   Date Value Ref Range Status   01/09/2025 4.4 3.4 - 5.3 mmol/L Final   04/25/2022 4.4 3.4 - 5.3 mmol/L Final   01/13/2021 4.1 3.4 - 5.3 mmol/L Final     Chloride   Date Value Ref Range Status   01/09/2025 101 98 - 107 mmol/L Final   04/25/2022 105 94 - 109 mmol/L Final   01/13/2021 108 94 - 109 mmol/L Final     Carbon Dioxide   Date Value Ref Range Status   01/13/2021 28 20 - 32 mmol/L Final     Carbon Dioxide (CO2)   Date Value Ref Range Status   01/09/2025 26 22 - 29 mmol/L Final   04/25/2022 26 20 - 32 mmol/L Final     Anion Gap   Date Value Ref Range Status   01/09/2025 11 7 - 15 mmol/L Final   04/25/2022 6 3 - 14 mmol/L Final   01/13/2021 4 3 - 14 mmol/L Final     Glucose   Date Value Ref Range Status   01/09/2025 99 70 - 99 mg/dL Final   04/25/2022 104 (H) 70 - 99 mg/dL Final   01/13/2021 91 70 - 99 mg/dL Final     Comment:     Fasting specimen     Urea Nitrogen   Date Value Ref Range Status   01/09/2025 22.3 8.0 - 23.0 mg/dL Final   04/25/2022 23 7 - 30 mg/dL Final   01/13/2021 18 7 - 30 mg/dL Final     Creatinine   Date Value Ref Range Status   01/09/2025 0.72 0.51 - 0.95 mg/dL Final   01/13/2021 0.68 0.52 - 1.04 mg/dL Final     GFR Estimate   Date Value Ref Range Status   01/09/2025 89 >60 mL/min/1.73m2 Final     Comment:     eGFR calculated using 2021 CKD-EPI equation.   01/13/2021  >90 >60 mL/min/[1.73_m2] Final     Comment:     Non  GFR Calc  Starting 12/18/2018, serum creatinine based estimated GFR (eGFR) will be   calculated using the Chronic Kidney Disease Epidemiology Collaboration   (CKD-EPI) equation.       Calcium   Date Value Ref Range Status   01/09/2025 9.7 8.8 - 10.4 mg/dL Final     Comment:     Reference intervals for this test were updated on 7/16/2024 to reflect our healthy population more accurately. There may be differences in the flagging of prior results with similar values performed with this method. Those prior results can be interpreted in the context of the updated reference intervals.   01/13/2021 8.8 8.5 - 10.1 mg/dL Final     Bilirubin Total   Date Value Ref Range Status   02/05/2024 0.7 <=1.2 mg/dL Final   01/13/2021 0.8 0.2 - 1.3 mg/dL Final     Alkaline Phosphatase   Date Value Ref Range Status   02/05/2024 75 40 - 150 U/L Final     Comment:     Reference intervals for this test were updated on 11/14/2023 to more accurately reflect our healthy population. There may be differences in the flagging of prior results with similar values performed with this method. Interpretation of those prior results can be made in the context of the updated reference intervals.   01/13/2021 72 40 - 150 U/L Final     ALT   Date Value Ref Range Status   02/05/2024 16 0 - 50 U/L Final     Comment:     Reference intervals for this test were updated on 6/12/2023 to more accurately reflect our healthy population. There may be differences in the flagging of prior results with similar values performed with this method. Interpretation of those prior results can be made in the context of the updated reference intervals.     01/13/2021 17 0 - 50 U/L Final     AST   Date Value Ref Range Status   02/05/2024 19 0 - 45 U/L Final     Comment:     Reference intervals for this test were updated on 6/12/2023 to more accurately reflect our healthy population. There may be differences in  the flagging of prior results with similar values performed with this method. Interpretation of those prior results can be made in the context of the updated reference intervals.   01/13/2021 13 0 - 45 U/L Final     Review of Systems:  10 systems (general, cardiovascular, respiratory, eyes, ENT, endocrine, GI, , M/S, neurological) were reviewed. Most pertinent finding(s) is/are: Some chronic pains, intermittent headaches, see HPI above. The remaining systems are all unremarkable.    Mental Status Examination (limited as this is by phone/video):  Appearance: Awake, alert, appears stated age, no acute distress, well-groomed   Attitude:  cooperative, pleasant   Motor: No gross abnormalities noted today.  Not formally tested  Oriented to:  person, place, time, and situation  Attention Span and Concentration: Attention seems intact, concentration struggles noted but not formally tested  Speech: Normal volume, some hesitation noted, possible word-finding difficulties?  Language: possible word-finding difficulties?  Mood: A little better  Affect: Mood congruent  Associations:  no loose associations  Thought Process: Overall linear and logical  Thought Content:  no evidence of suicidal ideation or homicidal ideation, no auditory hallucinations present and no visual hallucinations present  Recent and Remote Memory: Seems to be struggling more with some short-term memory -not formally tested  Fund of Knowledge: appropriate  Insight: Fair-good  Judgment:  intact, adequate for safety  Impulse Control:  intact    Suicide Risk Assessment:  Today Jolynn Haji reports no suicidal ideation. Based on all available evidence including the factors cited above, Jolynn Haji does not appear to be at imminent risk for self-harm, does not meet criteria for a 72-hr hold, and therefore remains appropriate for ongoing outpatient level of care.  A thorough assessment of risk factors related to suicide and self-harm have been reviewed and are  noted above. The patient convincingly denies suicidality on several occasions. Local community safety resources reviewed for patient to use if needed. There was no deceit detected, and the patient presented in a manner that was believable.     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  R/O Underlying Parkinson's Disease (pt has seen neurology - continuing to monitor)    Medical comorbidities include:   Patient Active Problem List    Diagnosis Date Noted    Essential hypertension with goal blood pressure less than 140/90 - then Orthostatic hypotension- resolved off of lisinopril 2.5mg daily and doing daily 2.25 mile walks with her dog 07/16/2001     Priority: High    Moderate recurrent major depression (H) 12/19/2024     Priority: Medium    Orthostatic hypotension- resolved off of lisinopril 2.5mg daily and doing daily 2.25 mile walks with her dog 10/14/2024     Priority: Medium    Major depressive disorder, recurrent, mild 12/06/2023     Priority: Medium    Bilateral lower extremity edema- trace pitting edema distal LE to mid pretibial areas Left > right 09/27/2023     Priority: Medium    Tremor 09/27/2023     Priority: Medium    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 - lower pretibial area  07/12/2023     Priority: Medium    Drug-induced parkinsonism (H)- Abilify - generic = ARIPiprazole( 5mg) - RESOLVED OFF ABILIFY  01/02/2023     Priority: Medium    Mood disorder 01/02/2023     Priority: Medium    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 - much improved - resolved as of 5/5/2023 07/07/2022     Priority: Medium    Adjustment insomnia - resolved as of 5/5/2023 - off of ambien with psychiatry's okay  12/23/2021     Priority: Medium    Screening for cervical cancer       Priority: Medium     1999 NIL  2000 ASCUS-H.  >> Colpo: Neg  2001 NIL   2003 NIL pap, Neg HPV  [id number], 2016  NIL pap  7/25/19 NIL pap, Neg HPV    Criteria necessary to stop screening per ASCCP guidelines:  Older than 65 years  Three consecutive negative cytology results or two consecutive negative cotest results within the previous 10 years, with the most recent being performed within the last 5 years.   (Women with hx of MADDIE 2 or 3, or adenocarcinoma in situ should continue screening for full 20 years, even if this goes past age 65).        Hyperlipidemia LDL goal <130 11/30/2018     Priority: Medium    Basal cell carcinoma      Priority: Medium    Colon polyps 05/01/2018     Priority: Medium    Primary localized osteoarthrosis of left lower leg 02/19/2018     Priority: Medium    Other secondary osteoarthritis of left knee 01/29/2018     Priority: Medium    Chronic constipation 09/14/2017     Priority: Medium    Raynaud's phenomenon without gangrene 09/14/2017     Priority: Medium    Insomnia, unspecified type 07/25/2017     Priority: Medium    TIFFANY (generalized anxiety disorder) 05/23/2017     Priority: Medium    Adjustment disorder with mixed anxiety and depressed mood- mild  at this time 05/11/2017     Priority: Medium    Family history of celiac disease 05/11/2017     Priority: Medium    Basal cell carcinoma of leg, right 05/01/2016     Priority: Medium    Sun-damaged skin 04/21/2016     Priority: Medium    Seasonal allergic rhinitis 04/21/2016     Priority: Medium    Dupuytren's disease of palm - right 4th digit flexor tendon 06/11/2015     Priority: Medium    History of migraine headaches- assoc. with nausea/vomiting - resolved around menopause - were  premenstrual  01/08/2014     Priority: Medium    Rosacea 05/01/2013     Priority: Medium    Numbness of feet- distal feet R>L  02/21/2011     Priority: Medium    NUMBNESS AND TINGLING OF FOOT - bilateral -mild  08/25/2010     Priority: Medium    Osteopenia  01/02/2009     Priority: Medium    Lipidosis 10/24/2006     Priority: Medium     Problem list name updated by automated process. Provider to review      Localized osteoarthritis of hand 11/01/2005     Priority: Medium     Problem list name updated by automated process. Provider to review      Stress incontinence, female - mild 10/23/2012     Priority: Low    Cough 10/23/2012     Priority: Low    Acute bronchitis- recurrent- ? related to mold in school building- pt has since retired and no further bronchitis 10/23/2012     Priority: Low    Postmenopausal      Priority: Low    DERMATOPHYTOSIS OF NAIL- toenails  03/14/2006     Priority: Low     trichophyton rubrum on culture - 1/06       Synovial cyst of popliteal space      Priority: Low    Hidradenitis 12/05/2003     Priority: Low       Psychosocial & Contextual Factors: see HPI above    Assessment:  From Intake, 5/12/2022:  Jolynn Haji is a 66-year-old female with a history including anxiety, depression, insomnia status post concussion in April 2017.  No major mental health struggles prior to concussion in 2017.  Patient was started on Lexapro, quetiapine, mirtazapine to help with her symptoms.  Patient had been feeling much better on that medication combination and once she was sleeping well again quetiapine and mirtazapine were discontinued.  This past January the patient started to have some worsening symptoms and Abilify was added.  Patient has felt like her current medications of Lexapro and Abilify have been helpful but reports starting to feel really slowed and numb the past several weeks.  She also reported a weight loss of nearly 20 pounds over the last 6 weeks versus several months (patient was not very sure). I am wondering if she could be experiencing side effects of her increased Lexapro dose.  She is agreeable to decreasing her dose slightly to 15 mg daily.  Could also consider decreasing Abilify in the case it is not her Lexapro.  She also desires to  sleep a little bit better than she has been.  Could consider restarting mirtazapine at bedtime for sleep and to also augment her Lexapro.  Could also be beneficial for patient to undergo cognitive screening to check in and see what her baseline is status post concussion.  No acute safety concerns.  No SI.  No problematic drug or alcohol use.    5/24/2022:  Patient with some slight improvements of possible medication related side effects after decreasing Lexapro.  I am wondering if there might be more robust improvements with decreasing Abilify/aripiprazole.  Possible drug-induced Parkinsonism?  She does seem to have delayed/slowed speech, masked facies.  Her gait and other movements unable to be observed but she does report other struggles that may be consistent with such a presentation.  We are going to decrease her Abilify down to 2 mg daily and I will see her back in just a few weeks.  We will likely discontinue her Abilify at her next visit.  I will message her primary care provider to get some collateral regarding previous baseline functioning prior to starting Abilify.  History of TBI could be confounding presentation.  Could consider neurology consult.    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  Instructed to stop Abilify today.  We will monitor for improvement of symptoms.  If symptoms do not improve may need to refer to neurology.  If symptoms start to improve we will consider low-dose methylphenidate augmentation of Lexapro next visit to help with low energy, mood, focus and concentration, cognition, s/p TBI if still clinically indicated.  We would discuss risks and benefits with patient and her daughter at that time.  No other acute safety concerns or SI.  No problematic drug or alcohol use.  If patient starts to struggle with sleep would restart mirtazapine at bedtime.    7/28/2022:  Pt still with sxs consistent with drug-induced Parksinsonism.  Neurology consult will  be placed so that patient might get scheduled sooner than later due to the typically long wait.  We will start Ambien in hopes we can get her sleeping much better.  Discussed risks and benefits of its use, particularly in the geriatric population.  Lexapro has not been as helpful as it helped for her anxiety.  We will taper this medication and start Effexor/venlafaxine in hopes that might be more effective for her symptoms.  No acute safety concerns.  No SI.  No problematic drug or alcohol use.    8/11/2022:  Overall with continued improved symptoms.  Hopefully patient will continue to have improvement of her symptoms off of Abilify.  Patient will be seeing neurology soon.  Encouraged to keep appointment.  We will continue taper off of Lexapro and continue on monotherapy with venlafaxine.  We will also continue Ambien at this time for sleep since patient is sleeping much better on the medication with improved symptoms and tolerating well.  No acute safety concerns.  No problematic drug or alcohol use.    9/1/2022:  Ongoing anxiety, still intense at times.  Still not sleeping very well.  Could consider sleep evaluation, but patient quite overwhelmed right now with various referrals and doctors appointments.  Briseyda had been working quite well but not keeping her sleep now.  Still falling asleep relatively well.  We will transition to Ambien CR to see if that is more helpful.  We will continue to consider sleep evaluation to rule out possibility of REM behavior sleep disorder.  We will also transition venlafaxine to bedtime to see if that helps with any possible nausea.  Working with neurology to monitor for Parkinson's disease.  I do recommend neuropsychological testing due to ongoing possible cognitive difficulties/struggles.  No acute safety concerns.  No SI.  No problematic drug or alcohol use.    10/3/2022:  Patient overall with some ongoing anxiety but slowly improving since it got worse late August.  Recommend  continuing to optimize Effexor-XR, especially since patient tolerating well.  No longer having any nausea now that dose is taken at bedtime.  Patient feeling a little foggy and off balance with Ambien CR and so we will switch back to Ambien immediate release.  No falls.  Recommend increasing Effexor-XR first and once well tolerated switching back to Ambien immediate release.  May need to continue to work on alternative options for sleep if does not do well back on Ambien immediate release.  No acute safety concerns.  No SI.  No problematic drug or alcohol use.  Encouraged to continue working with neurology.  Next appointment in November.    10/24/2022:  Patient overall with some improved anxiety on increased dose of Effexor-XR.  Could use additional improvement of anxiety and so we will continue to optimize therapy with Effexor-XR.  We will increase dose to 150 mg daily.  Tolerating well with no notable negative side effects.  Patient transitioned back to Ambien CR and is sleeping better on this compared to Ambien immediate release.  We will continue Ambien CR for sleep.  Discussed possibility of sleep medicine referral but patient declines at this time.  Could consider in the future.  No acute safety concerns.  No SI.  No problematic drug or alcohol use.  Encouraged to continue working with neurology.    12/09/2022:  Patient overall doing fairly well.  Feels like increased dose of venlafaxine has been helpful.  I do wonder if she is having some subtle withdrawal symptoms late in the afternoon with headache and fatigue.  Discussed shifting her dose to the morning.  However, she reports she will try to drink more water and see if that is helpful before trying to make changes to her medication.  She feels like things are going well enough that she really does not want to change anything.  Encouraged to continue in therapy.  No acute safety concerns.  No SI.  No problematic drug or alcohol use.    1/19/2023:  Patient  overall doing well with ongoing improvements.  Patient even recently starting to drive again which is a big deal.  She continues in therapy and finds it helpful.  Discussed possible changes to her dosing to see if headaches might improve, along with fatigue.  Discussed possibly splitting her Effexor-XR dose as 75 mg twice daily versus 37 point 5 in the morning and 112.5 mg at bedtime.  Patient feels like she is in a good place right now and really does not want to make any medication changes if possible.  We will keep things the same for now.  Encouraged to drink lots of water.  No acute safety concerns.  No SI.  No problematic drug or alcohol use.    3/9/2023:  Since last visit we decreased Effexor-XR just slightly.  Unfortunately her anxiety has worsened on decreased dose.  Headaches and fatigue did not improve at all.  Since anxiety worsened we will go back up to 150 mg daily.  Patient prefers this over a trial of further decreased dose.  We will start a trial with propranolol to see if this helps improve anxiety and headaches.  May also help with her tremor.  Discussed watching for feeling too lightheaded or dizzy.  Patient also on low-dose lisinopril.  Last vital signs blood pressure 132/78 and pulse 96 on 2/24/2023.  No acute safety concerns.  No SI.  No problematic drug or alcohol use.    3/23/2023:  Patient overall doing very well.  Propranolol has been quite helpful.  Still having some anxiety and headaches so we will continue to optimize therapy with propranolol some.  We will increase to 20 mg twice daily.  Neuropsychiatric testing reviewed.  Some mild deficits but nothing indicating a neurocognitive disorder at this time.  This was reassuring to the patient.  Patient will review results with neurologist at upcoming appointment in May.  No acute safety concerns.  No SI.  No problematic drug or alcohol use.    4/27/2023:  Patient overall with relative stability of mood and anxiety symptoms.  We will work  to discontinue Ambien to see if we can manage on medication such as trazodone.  I still continue to worry about possibility of prodromal Parkinson's disease.  Patient reported her sister noted patient to be yelling and screaming in her sleep the other night.  This concerns me for possible REM sleep behavior disorder.  Could simply be related to antidepressant therapy-but given recent parkinsonism response to neuroleptic, symptoms should continue to be monitored closely.  Could again consider sleep medicine referral at some point in the future if needed.  Patient also continues to have headaches.  If patient remains stable would like to trial decreased dose of Effexor-XR to see if this helps improve headaches.  No acute safety concerns.  No SI.  No problematic drug or alcohol use.    5/11/2023:  Patient overall doing well.  Was able to replace Ambien with trazodone and still sleeping well.  Still some headaches but greatly improved off Ambien.  Went back to taking propranolol 20 mg twice daily and finds this most effective for her anxiety, tremor, etc.  Discussed we could consider lowering venlafaxine ER in a couple of months if still doing really well and still having some headaches.  Patient has a neurology appointment tomorrow for check-in.  No acute safety concerns.  No SI.  No problematic drug or alcohol use.    7/11/2023:  Patient overall doing well still.  Mood has remained stable.  Anxiety overall manageable and stable as well.  Unfortunately continues to have headaches and feel fatigued.  Discussed I would like to move ahead with a trial of decreased venlafaxine.  Patient agreeable and will monitor mental health symptoms.  No acute safety concerns.  No SI.  No problematic drug or alcohol use.    10/10/2023:  Patient overall doing okay.  Unfortunately headaches and fatigue have persisted even with decreased dose of venlafaxine.  We will increase propranolol and changed to extended release so it is once a day  to see if that is a little bit more helpful for anxiety and headaches.  Patient controlled decreased dose of venlafaxine and we may consider changing to something different after the holidays.  We would like to prevent any decompensation during the holiday season.  No acute safety concerns.  No SI.  No problematic drug or alcohol use.  Patient continues in therapy.    1/9/2024:  Patient overall doing relatively well.  Still getting headaches from venlafaxine so we will decrease the dose further to 75 mg daily to see if helpful without worsening mental health symptoms.  No acute safety concerns.  No SI.  No problematic drug or alcohol use.    2/28/2024:  Patient doing relatively well.  Still some anxiety but manageable.  Anxiety does not currently interfere with patient's ability to function or go about daily activities.  Easily able to distract self.  No major mood symptoms at this time.  Side effects from venlafaxine have drastically improved on lower dose.  If mental health symptoms/anxiety were to worsen in the future, would strongly consider transition to sertraline due to negative side effects on higher doses of venlafaxine.  No acute safety concerns.  No SI.  No problematic drug or alcohol use.    6/26/2024:  Patient overall doing very well.  Mood has been stable.  Anxiety manageable.  Does use Tylenol twice a day every day for headaches.  Discussed possibility of some rebound headaches.  Resources sent to patient via [employer].  We will continue to monitor dizziness.  Blood pressures can be a little low at times.  We discussed trazodone can cause dizziness and orthostatic hypotension (blood pressure drops upon standing).  Patient wonders if it is more related to her lisinopril.  Patient encouraged to document blood pressures and present to primary care provider for ongoing evaluation.  No acute safety concerns.  No SI.  No problematic drug or alcohol use.  No medication changes today.    9/26/2024:  Patient  overall doing well.  Relatively stable.  Managing anxiety symptoms well.  Sleeping well without nightly trazodone.  Discussed patient could consider massage therapy or acupuncture to see if it improves headaches.  Patient prefers not to change Effexor at this time.  Discussed patient could also talk with primary care provider about possible neurology referral for chronic headaches.  No acute safety concerns.  No SI.  No problematic drug or alcohol use.  No med changes today.    11/20/2024:  Pt doing ok - still some ongoing anxiety and headaches - possibly from Effexor. Pt will consider transitioning off Effexor-XR at next appt 1/2 (after the holidays). Could start fluoxetine 10 mg daily for 5 days (with effexor-xr 75) then decrease Effexor-XR to 37.5 mg (and increase fluoxetine to 20 mg daily) for 7 days then stop Effexor-xr and either continue of fluoxetine or trial a new medication (duloxetine?). No acute safety concerns. No SI. No problematic drug or alcohol use.      1/2/2025:  Patient unfortunately with some worsening symptoms.  Worsening chronic headaches.  Worsening tremor.  Ongoing balance struggles that are perhaps worse.  Struggling more with memory.  Anxiety greatly increased.  Patient agreeable to transitioning off venlafaxine and onto sertraline/Zoloft.  Patient also encouraged to follow up with neurology.  CT scan of head/brain ordered without contrast due to ongoing/worsening headaches and cognitive concerns and balance issues.  Last CT scan of head completed in 2017.  No acute safety concerns.  No SI.  No problematic drug or alcohol use.    1/21/2025:  Overall doing quite well with transition to sertraline.  Patient will continue with the cross taper-titration.  It does seem she is possibly having some improvement since starting sertraline already.  Patient and daughter will continue to monitor for side effects.  No acute safety concerns.  No suicidality.  No problematic drug or alcohol use.  Started  with new psychotherapist and feels it is a good connection.    Medication side effects and alternatives were reviewed. Health promotion activities recommended and reviewed today. All questions addressed. Education and counseling completed regarding risks and benefits of medications and psychotherapy options. Recommend therapy for additional support.     Treatment Plan:   Continue trazodone 12.5-50 mg at bedtime as needed for sleep  Continue propranolol EXTENDED RELEASE 60 mg ONCE daily for anxiety, tremor, headaches.  Make the following medication changes:  Days 1-5  Decrease venlafaxine/Effexor-XR to 75 mg daily  Start sertraline/Zoloft 25 mg daily  Days 6-15  Decrease venlafaxine/Effexor-XR to 37.5 mg daily  Increase sertraline to 50 mg daily  Days 16-25  Decrease venlafaxine/Effexor-XR to 37.5 mg once EVERY OTHER day  Increase sertraline/Zoloft to 75 mg daily  Days 26 and on  STOP venlafaxine/Effexor-XR   Increase sertraline/Zoloft to 100 mg daily as tolerated (or ok to stay at 75 mg dose)  Schedule follow-up with you neurologist, Dr. Amato. Can call [phone] to get scheduled.   Schedule and complete CT scan of head/brain. Someone from CT/imaging department should call to get you scheduled for a CT scan of your brain.   Continue all other cares per primary care provider.   Continue all other medications as reviewed per electronic medical record today.   Safety plan reviewed. To the Emergency Department as needed or call after hours crisis line at [phone] or [phone]. [place] Crisis Text Line. Text [place] to 926547 or Suicide LifeLine Chat: suicidepreventionlifeline.org/chat  Continue individual psychotherapy for additional support and ongoing development of nonpharmacologic coping skills and strategies.  Schedule an appointment with me in 4-6 weeks or sooner as needed. Call [facility] at [phone] to schedule.  Follow up with primary care provider as planned or for acute medical  concerns.  Call the psychiatric nurse line with medication questions or concerns at [phone].  [facility] may be used to communicate with your provider, but this is not intended to be used for emergencies.    Administrative Billing:   Phone Call/Video Duration: 23 Minutes  Start: 11:42a  Stop: 12:05a    The longitudinal plan of care for the diagnosis(es)/condition(s) as documented were addressed during this visit. Due to the added complexity in care, I will continue to support Azalea in the subsequent management and with ongoing continuity of care.    Patient Status:  Continuous care patient.     Signed:   Judy Pompa DO  [facility]    Disclaimer: This note consists of symbols derived from keyboarding, dictation and/or voice recognition software. As a result, there may be errors in the script that have gone undetected. Please consider this when interpreting information found in this chart.

## 2025-01-22 ENCOUNTER — VIRTUAL VISIT (OUTPATIENT)
Dept: PSYCHOLOGY | Facility: CLINIC | Age: 70
End: 2025-01-22
Payer: COMMERCIAL

## 2025-01-22 DIAGNOSIS — F41.1 GENERALIZED ANXIETY DISORDER: Primary | ICD-10-CM

## 2025-01-22 PROCEDURE — 90837 PSYTX W PT 60 MINUTES: CPT | Mod: 95

## 2025-01-22 NOTE — PROGRESS NOTES
[facility]                                     Progress Note    Patient Name: Jolynn Haji  Date: 1/22/2025         Service Type: Individual      Session Start Time: 10:00 am  Session End Time: 10:53 am     Session Length: 53 min    Session #: 2 (previous therapy with Erika RAIN)    Attendees: Client attended alone    Service Modality:  Video Visit:      Provider verified identity through the following two step process.  Patient provided:  Patient address    Telemedicine Visit: The patient's condition can be safely assessed and treated via synchronous audio and visual telemedicine encounter.      Reason for Telemedicine Visit: Patient convenience (e.g. access to timely appointments / distance to available provider)    Originating Site (Patient Location): Patient's home    Distant Site (Provider Location): Provider Remote Setting- Home Office    Consent:  The patient/guardian has verbally consented to: the potential risks and benefits of telemedicine (video visit) versus in person care; bill my insurance or make self-payment for services provided; and responsibility for payment of non-covered services.     Patient would like the video invitation sent by:  My Chart    Mode of Communication:  Video Conference via [facility]    Distant Location (Provider):  Off-site    As the provider I attest to compliance with applicable laws and regulations related to telemedicine.    DATA  Interactive Complexity: No  Crisis: No        Progress Since Last Session (Related to Symptoms / Goals / Homework):   Symptoms: No change anxiety    Homework: Achieved / completed to satisfaction      Episode of Care Goals: Minimal progress - CONTEMPLATION (Considering change and yet undecided); Intervened by assessing the negative and positive thinking (ambivalence) about behavior change     Current / Ongoing Stressors and Concerns:   Current functioning, cognitive questions, overwhelm. Transitioning medications, concerned about the  process-final transition off Venlafaxine     Treatment Objective(s) Addressed in This Session:   Sleep hygiene  Grounding skills     Intervention:   Interpersonal Therapy: provided active listening and validation of experience with stressors. Utilized rapport building, discussed grounding skills, sleep hygiene strategies. GLAD gratitude practice.    Assessments completed prior to visit:  The following assessments were completed by patient for this visit:  PHQ2:       5/3/2023     1:18 PM 3/21/2023    11:20 AM 2/23/2023     7:15 PM 1/31/2023     3:21 PM 1/12/2023     4:28 PM 12/15/2022     9:12 PM 12/8/2022     9:38 AM   PHQ-2 ( 1999 [employer])   Q1: Little interest or pleasure in doing things   0 0 0  0   Q2: Feeling down, depressed or hopeless   0 0 0  0   PHQ-2 Score   0 0 0  0   Q1: Little interest or pleasure in doing things   Not at all Not at all Not at all  Not at all   Q2: Feeling down, depressed or hopeless   Not at all Not at all Not at all  Not at all   PHQ-2 Score Incomplete Incomplete 0 0 0 Incomplete 0     PHQ9:       11/18/2024     9:16 AM 12/2/2024     5:16 PM 12/10/2024     5:27 PM 12/12/2024     1:09 PM 12/17/2024     2:23 PM 1/1/2025    11:51 AM 1/8/2025    11:26 AM   PHQ-9 SCORE   PHQ-9 Total Score MyChart 1 (Minimal depression) 2 (Minimal depression) 2 (Minimal depression)  10 (Moderate depression) 8 (Mild depression) 11 (Moderate depression)   PHQ-9 Total Score 1  2  2  12 10  8  11        Patient-reported     GAD2:       7/29/2024    11:49 AM 9/8/2024     6:03 PM 9/23/2024     3:00 PM 10/8/2024     4:08 PM 11/18/2024     9:17 AM 12/2/2024     5:19 PM 12/28/2024     3:34 PM   TIFFANY-2   Feeling nervous, anxious, or on edge 1 1 1 1 1 2 3   Not being able to stop or control worrying 0 0 0 0 0 1 2   TIFFANY-2 Total Score 1 1 1    1 1 1  3  5        Patient-reported     GAD7:       10/12/2024     5:12 PM 10/14/2024    11:11 AM 12/2/2024     5:19 PM 12/12/2024     1:09 PM 12/17/2024     2:25 PM 1/6/2025      8:49 AM 1/8/2025    11:28 AM   TIFFANY-7 SCORE   Total Score 1 (minimal anxiety) 3 (minimal anxiety) 2 (minimal anxiety)  8 (mild anxiety) 9 (mild anxiety) 6 (mild anxiety)   Total Score 1 3 2  5 8  9  6        Patient-reported     CAGE-AID:       6/6/2017    12:05 PM 5/12/2022     2:26 PM   CAGE-AID Total Score   Total Score  0    0   Total Score MyChart  0 (A total score of 2 or greater is considered clinically significant)       Information is confidential and restricted. Go to Review Flowsheets to unlock data.     PROMIS 10-Global Health (only subscores and total score):       2/25/2024     2:04 PM 5/27/2024     7:01 PM 6/9/2024     8:48 PM 6/23/2024     8:01 PM 9/23/2024     9:33 AM 10/8/2024     4:11 PM 12/28/2024     3:39 PM   PROMIS-10 Scores Only   Global Mental Health Score 17 17 17 17    17 17    17    17 18 12    Global Physical Health Score 17 19 19 19    19 18    18    18 17 12    PROMIS TOTAL - SUBSCORES 34 36 36 36    36 35    35    35 35 24        Patient-reported     [place] Suicide Severity Rating Scale (Lifetime/Recent)      5/12/2022    10:07 PM 9/27/2022     2:16 PM 2/28/2024    10:11 AM   [place] Suicide Severity Rating (Lifetime/Recent)   1. Wish to be Dead (Lifetime) N Y Y   Wish to be Dead Description (Lifetime)  fleeting thought of wishing she were dead    1. Wish to be Dead (Past 1 Month)  N N   2. Non-Specific Active Suicidal Thoughts (Lifetime) N N N   Most Severe Ideation Rating (Lifetime)  1 1   Most Severe Ideation Rating (Past 1 Month)  1 1   Frequency (Lifetime)  1 1   Frequency (Past 1 Month)  1    Duration (Lifetime)  1 1   Duration (Past 1 Month)  1    Controllability (Lifetime)  1    Controllability (Past 1 Month)  1    Deterrents (Lifetime)  1    Deterrents (Past 1 Month)  1    Reasons for Ideation (Lifetime)  4 5   Reasons for Ideation (Past 1 Month)  4 0   Actual Attempt (Lifetime) N N N   Has subject engaged in non-suicidal self-injurious behavior? (Lifetime) N N N    Interrupted Attempts (Lifetime) N N N   Aborted or Self-Interrupted Attempt (Lifetime) N N N   Preparatory Acts or Behavior (Lifetime) N N N   Calculated C-SSRS Risk Score (Lifetime/Recent) No Risk Indicated No Risk Indicated No Risk Indicated         ASSESSMENT: Current Emotional / Mental Status (status of significant symptoms):   Risk status (Self / Other harm or suicidal ideation)   Patient denies current fears or concerns for personal safety.   Patient denies current or recent suicidal ideation or behaviors.   Patient denies current or recent homicidal ideation or behaviors.   Patient denies current or recent self injurious behavior or ideation.   Patient denies other safety concerns.   Patient reports there has been no change in risk factors since their last session.     Patient reports there has been no change in protective factors since their last session.     Recommended that patient call 911 or go to the local ED should there be a change in any of these risk factors     Appearance:   Appropriate    Eye Contact:   Fair    Psychomotor Behavior: Normal  Restless    Attitude:   Cooperative    Orientation:   All   Speech    Rate / Production: Hyperverbal  Normal     Volume:  Normal    Mood:    Anxious  Normal   Affect:    Appropriate    Thought Content:  Clear    Thought Form:  Coherent  Logical  Circumstantial   Insight:    Fair      Medication Review:   No changes to current psychiatric medication(s)     Medication Compliance:   Yes     Changes in Health Issues:   None reported     Chemical Use Review:   Substance Use: Chemical use reviewed, no active concerns identified      Tobacco Use: No current tobacco use.      Diagnosis:  1. Generalized anxiety disorder      Collateral Reports Completed:   Not Applicable    PLAN: (Patient Tasks / Therapist Tasks / Other)  Attend to mindfulness grounding practices, GLAD gratitude        Mica Tang, [facility]  1/22/25                                                          ______________________________________________________________________

## 2025-01-22 NOTE — PROGRESS NOTES
Airway  Date/Time: 1/22/2025 1:55 PM  Urgency: elective    Airway not difficult    General Information and Staff    Patient location during procedure: OR  Anesthesiologist: Xi Arrington MD  Performed: anesthesiologist   Performed by: Xi Arrington MD  Authorized by: Xi Arrington MD      Indications and Patient Condition  Indications for airway management: anesthesia  Spontaneous Ventilation: absent  Sedation level: deep  Preoxygenated: yes  Patient position: sniffing  Mask difficulty assessment: 1 - vent by mask    Final Airway Details  Final airway type: endotracheal airway      Successful airway: ETT  Cuffed: yes   Successful intubation technique: Video laryngoscopy  Endotracheal tube insertion site: oral  Blade: Chelly  Blade size: #4  ETT size (mm): 7.5    Cormack-Lehane Classification: grade I - full view of glottis  Placement verified by: capnometry   Measured from: lips  ETT to lips (cm): 23  Number of attempts at approach: 1  Number of other approaches attempted: 0       [facility]  May 17, 2023      Behavioral Health Clinician Progress Note    Patient Name: Jolynn Haji           Service Type:  Individual      Service Location:   MyChart / Email (patient reached)     Session Start Time: 12:00 pm   Session End Time: 12:40 pm      Session Length: 38 - 52      Attendees: Patient     Service Modality:  Video Visit:      Provider verified identity through the following two step process.  Patient provided:  Patient is known previously to provider    Telemedicine Visit: The patient's condition can be safely assessed and treated via synchronous audio and visual telemedicine encounter.      Reason for Telemedicine Visit: Patient has requested telehealth visit    Originating Site (Patient Location): Patient's home    Distant Site (Provider Location): [facility]    Consent:  The patient/guardian has verbally consented to: the potential risks and benefits of telemedicine (video visit) versus in person care; bill my insurance or make self-payment for services provided; and responsibility for payment of non-covered services.     Patient would like the video invitation sent by:  My Chart    Mode of Communication:  Video Conference via [employer]well    As the provider I attest to compliance with applicable laws and regulations related to telemedicine.    Visit Activities (Refresh list every visit): [facility] Only    Diagnostic Assessment Date: 9/27/22  Treatment Plan Review Date: 7/27/23  See Flowsheets for today's PHQ-9 and TIFFANY-7 results  Previous PHQ-9:       3/23/2023     5:30 PM 4/26/2023    11:16 AM 5/3/2023     1:18 PM   PHQ-9 SCORE   PHQ-9 Total Score MyChart 1 (Minimal depression) 1 (Minimal depression) 1 (Minimal depression)   PHQ-9 Total Score 1 1 1     Previous TIFFANY-7:       3/21/2023    11:20 AM 3/23/2023     5:33 PM 5/3/2023     1:18 PM   TIFFANY-7 SCORE   Total Score 1 (minimal anxiety) 5 (mild anxiety) 3  (minimal anxiety)   Total Score 1 5 3       KARINA LEVEL:      8/27/2010     3:00 PM 2/22/2011    11:00 AM   KARINA Score (Last Two)   KARINA Raw Score 51 49   Activation Score 91.6 82.8   KARINA Level 4 4       DATA  Extended Session (60+ minutes): No  Interactive Complexity: No  Crisis: No  [facility] Patient: No    Treatment Objective(s) Addressed in This Session:  Target Behavior(s): anxiety and depression    Depressed Mood: Increase interest, engagement, and pleasure in doing things  Decrease frequency and intensity of feeling down, depressed, hopeless  Improve quantity and quality of night time sleep / decrease daytime naps  Feel less tired and more energy during the day   Improve diet, appetite, mindful eating, and / or meal planning  Identify negative self-talk and behaviors: challenge core beliefs, myths, and actions  Improve concentration, focus, and mindfulness in daily activities   Feel less fidgety, restless or slow in daily activities / interpersonal interactions  Anxiety: will experience a reduction in anxiety, will develop more effective coping skills to manage anxiety symptoms, will develop healthy cognitive patterns and beliefs and will increase ability to function adaptively    Current Stressors / Issues:  Went to see friend with high anxiety. She said conversation was good and she was able to help with relating to her. Discussed challenging self to drive further distances. This has been going really well for her. Discussed husbands doctors appt and decision to have surgery or not.      5/11/23 ([facility])  MH update: Reports feeling more clear headed on trazodone and off Ambien. She is taking half the pill at night. Headaches have been less and less sever. No change in sleep. She has been more active in doing things she would have done over a year ago. Not stressing about the cabin remodel. She has been spending time with grandchildren. Notes family has really noticed difference in her mental health. Reports reading  "recommended books by [facility]. Going to see friend struggling with mental health.   [place]  Appetite: unremarkable  Sleep: unremarkable  Therapy: weekly therapy  FARIDA: na  Preg: na  Most important: Does it matter if she alters the dose of trazodone from day to day.     5/1/23  [place]orts [place] [employer] [place]. Feels things are progressing well. Discussed switching medication for sleep to Trazodone. She has been sleeping the same on Ambien. She is taking a half pill and not having any grogginess. She has been engaged with friends and [employer]. Recommended Gifts of Imperfections and I Thought It Was Just Me by Darrick Arteaga. Also Decluttering Your Mind by CAT Melgar. Discussed having others notice positive changes in her. She struggles with having that spot light on her.       4/27/23 ([facility])   update: doing well with heightened anxiety but not panic attacks.   Stresses: [place] house renovations   Appetite: unremarkable  Sleep: unremarkable  Therapy: seeing therapist weekly, reports sister came for several days that was great. Making decision about traveling for family wedding.   FARIDA: na  Preg: na  Most important: continues to have headaches daily. Wants to discuss Ambien with MD/PCP suggested getting off completely. Would like to have Propanol for 10 mg to not have to cut.      4/19/23  Reports managing her anxiety around renovation of [place] bathroom has been difficult. Discussed desire for control and letting go.Reports sister is coming for several days.  is having surgery tomorrow. She has been walking and exercising more.      4/12/23  Reports it she is doing well and had a great Easter with family. Discussed more about her anxiety (ABC), being self blaming (what ifs/should have) at nature but at other times unknown source. Discussed eliminating \"should' for vocabulary due to shame, self judgement, and regret associated with the word. She will continue to work on ABC and look at the " "use of should.     3/31/23  Reports she is doing well. Anxiety has been pretty good but has headaches when anxious. Looking forward to going to theater with grandchildren. Medications have been very helpful. Discussed ABC of Anxiety. Discussed events of anxiety at its worse. Discussed the beliefs she has about anxiety events. Discussed consequences of event.     [facility]  [facility] update: Reports anxiety has decreased a lot on the propanol. Continues to have headaches but only about half the days. She has noticed a stronger correlation with headaches and anxiety. She completed Neuro Psych testing. Relieved she has no new dx. Planning on walking with dog. She is feeling more like her self, \"5 star days.\"    Stresses: Neuro psych testing and going to wake alone was stressful. Felt she managed very well.   Appetite: unremarkable  Sleep: unremarkable  Therapy: Continues to do weekly therapy. [place] she worried a lot about testing and did not , lesson in not worrying about things that have not happened yet. Feeling very accomplished after attending a wake alone without her . She stated it went very well and anxiety was lower than normal and then expected. Discussed decision making in the moment. Discussed it can be difficult to make decisions in the moment. Continues with coping skills, ie meditation and breathing tch.   FARIDA: na  Preg: na  Most important: Wants to address doses of medications.     3/14/23  Feels the propanol has been really helping with anxiety. She has been feeling like her self for the past 5 days. Continues to have several headaches. She is considering going back to yoga after looking as some neck stretches.     3/9/23 ([facility])  She has been more anxious on the different dosing of medications. She has also not noticed decrease in headaches. Discussed neck tension and using body scanning. She continues to feel a bit foggy in the afternoons.     3/3/23  Reports doing well. States she has been in " "communication with MD about headaches. Has noticed on days when she has lower dose of med she has more anxiety. She has been examining her anxiety when going out socially. Does not feel it is associated with the socializing itself but rather \"will the anxiety come up.\" Discussed upcoming appt for neuro psych testing. Is hesitant but willing to do testing. May want to talk to MD about it prior appt. Fearful about Drug Induced Parkinson's.       2/17/23  Pt reports she is doing well. Reports anxiety has been better. Able to control it better with accepting and reognition of anxiety. Continues to utilize meditation and review of skills taught in therapy. Ice packs has been helping with headaches. Self talk has much improved and has gained more confidence. Anxiety in the evening have improved but still feeling the anxiety at times. She has been wanting to work exercise into her daily routine. Sleep has been also improve. ID values: umm, family, self respect responsibility and kindness.       2/10/23  Pt reports she had such a good time with her trip to her sisters house. Much reduced anxious than expected (compared to home) and sleep was really good. Discussed implementing things that worked at [employer] for home. Continues to have some anxiety when going into store or before events. Reports panic attacks are less than previous week. Discussed triggers for anxiety. Discussed do expectations of self meet values.  ID values from next time and look at expectations for self. Pt asked \"What could I do better to be good enough\". Struggles with being good enough for self. Talked about list of things she wants to achieve in order to feel \"good enough\". One being able to go to places with out wondering if she will be anxious.     Identified Triggers for anxiety and background thought behind it:  More anxious when tired: less bandwidth to utilize skills  Feeling going to be late  Making quick decisions  Technology   organizing " big meal for company: what people expect of her  MD visits or waiting for visit: anticipations of events at MD, has not had poor experiences, husbands medical experience   What if something happens to Zach   Packing groceries and people are behind her  Sometimes going out to eat (much better): menu with too many options and having to make decisions  Holiday: anticipation and wanting it to be perfect for people/what ng people expect of her  Dealing with insurance problem: hassle and being out of her control   Being in the unknown about things: Slowness of recovery from anxiety, what ifs  Do people see I am anxious  Large crowd of people: leading up to and during (highest), people might be noticing her, dont want to stand   *Things that need to be done but are not done(floor needing to be washed, getting decorations down etc): likes list to check off (what to accomplish that week), Not accomplishing goal. Self placed expectations (high standards from growing up), perfectionism (mom perfectionism as a child), some self comparisons to others, taking on too much during a week, and expecting to be like old self all the time (compare self to old self).     2/1/23  Pt is reporting she is doing well. She has been out driving to do shopping and seeing friends. Continues to have improvement with anxiety each day. Continues to have headaches but is wanting to change medications until she gets back from trip to her sisters home. Having some anxiety about spending over night for the first time since COVID. Discussed steps to addressing anxiety at her sisters home and drive up. 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 Became dizzy and become worried about the physical sx she was experiencing. Anxiety grew from that point on. Discussed how she handled it in the moment with redirecting thoughts, accepting anxiety and attempting some of the anxiety tech discussed. Pt will explore other situations  in which anxiety tends to arise for next session. Pt has been keeping a journal of her daily experience and will reexamine journal to better understand anxiety. Pt listed several situations she knows brings anxiety.      Identified Triggers:  Feeling rushed  Not wanting to be late  Having others do responsibilities she once carried as her responsibility  Being flexible  Expectations of others  What do others see/think of her when experiencing panic attack    1/23/23  Reports she is doing really well. Reports she will be driving up to see her grandson. She is highly looking forward to this and making [employer] cards. Some anxiety with going in to the grocery store but not once she gets started shopping. Reports her ultimate goal would be to go to the mall alone with traffic as that has been causing anxiety. Headaches continue to be of concern to her. Discussed further and identified them coming following either a panic attack or after high anxiety. Discussed using sensory tech for elevating the anxiety and also for following headache such as heating pad for neck or eyes. Discussed her conversation with Dr. Pompa about possibly changing meds to help with headaches. She reports relishing in current progress and is not wanting meds to change at this time. Headaches do interfere sometimes with daily life. She will be going to lunch with several college friends. This has caused a bit of anxiety in accepting the invite. Normalized experience for pt.       Progress on Treatment Objective(s) / Homework:  Satisfactory progress - ACTION (Actively working towards change); Intervened by reinforcing change plan / affirming steps taken    Motivational Interviewing    MI Intervention: Expressed Empathy/Understanding, Supported Autonomy, Collaboration, Evocation, Open-ended questions, Change talk (evoked) and Reframe     Change Talk Expressed by the Patient: Desire to change Ability to change Reasons to change Need to  change    Provider Response to Change Talk: E - Evoked more info from patient about behavior change, A - Affirmed patient's thoughts, decisions, or attempts at behavior change, R - Reflected patient's change talk and S - Summarized patient's change talk statements      Care Plan review completed: Yes    Medication Review:  No changes to current psychiatric medication(s)    Medication Compliance:  Yes    Changes in Health Issues:   None reported    Chemical Use Review:   Substance Use: Chemical use reviewed, no active concerns identified      Tobacco Use: No current tobacco use.      Assessment: Current Emotional / Mental Status (status of significant symptoms):  Risk status (Self / Other harm or suicidal ideation)  Patient denies a history of suicidal ideation, suicide attempts, self-injurious behavior, homicidal ideation, homicidal behavior and and other safety concerns  Patient denies current fears or concerns for personal safety.  Patient denies current or recent suicidal ideation or behaviors.  Patient denies current or recent homicidal ideation or behaviors.  Patient denies current or recent self injurious behavior or ideation.  Patient denies other safety concerns.  A safety and risk management plan has not been developed at this time, however patient was encouraged to call [place] [phone] should there be a change in any of these risk factors.    Appearance:   Appropriate   Eye Contact:   Good   Psychomotor Behavior: Normal   Attitude:   Cooperative   Orientation:   All  Speech   Rate / Production: Normal    Volume:  Normal   Mood:    Anxious   Affect:    Appropriate   Thought Content:  Clear   Thought Form:  Coherent  Logical   Insight:    Good     Diagnoses:  1. Generalized anxiety disorder    2. Insomnia, unspecified type    3. Mood disorder (H)        Collateral Reports Completed:  Not Applicable    Plan: (Homework, other):  Patient was given information about behavioral services and encouraged to  schedule a follow up appointment with the clinic [facility] in 1 week.  She was also given information about mental health symptoms and treatment options .  CD Recommendations: No indications of CD issues.  Erika Francisco MSW LICSW      ______________________________________________________________________    Integrated Primary Care Behavioral Health Treatment Plan    Patient's Name: Jolynn Haji  YOB: 1955    Date of Creation: 10/5/22  Date Treatment Plan Last Reviewed/Revised: 4/27/23    DSM5 Diagnoses: 300.02 (F41.1) Generalized Anxiety Disorder  Psychosocial / Contextual Factors: Individual Factors high anxiety associated with depressive sx, interfereing with ability to function as she would like.  .  PROMIS (reviewed every 90 days):     Referral / Collaboration:  Was/were discussed and patient will pursue. - individual therapy    Anticipated number of session for this episode of care: 5-8 sessions  Anticipation frequency of session: Weekly  Anticipated Duration of each session: 38-52 minutes  Treatment plan will be reviewed in 90 days or when goals have been changed.       MeasurableTreatment Goal(s) related to diagnosis / functional impairment(s)  Goal 1: Patient will work with provider to manage symptoms    I will know I've met my goal when when less anxious and feeling down.      Objective #A (Patient Action)    Patient will  attend all sessions.  Status: Continued - Date(s): 4/27/23    Intervention(s)  Therapist will teach educate patient on treatment options, clarify concerns, work with pt to overcome any resistance to compliance..    Objective #B  Patient will identify 3 fears / thoughts that contribute to feeling anxious.  Status: Continued - Date(s): 4/27/23     Intervention(s)  Therapist will educate patient on treatment options, clarify concerns, work with pt to overcome any resistance to compliance..    Objective #C  Patient will identify 3 initial signs or symptoms of anxiety.  Status: Continued  - Date(s): 4/27/23     Intervention(s)  Therapist will educate patient on treatment options, clarify concerns, work with pt to overcome any resistance to compliance..        Patient has reviewed and agreed to the above plan.      DINESH Whipple  May 17, 2023  Answers for HPI/ROS submitted by the patient on 12/6/2022  TIFFANY 7 TOTAL SCORE: 3  Answers for HPI/ROS submitted by the patient on 3/8/2023  If you checked off any problems, how difficult have these problems made it for you to do your work, take care of things at home, or get along with other people?: Not difficult at all  PHQ9 TOTAL SCORE: 5  Answers for HPI/ROS submitted by the patient on 4/26/2023  If you checked off any problems, how difficult have these problems made it for you to do your work, take care of things at home, or get along with other people?: Somewhat difficult  PHQ9 TOTAL SCORE: 1

## 2025-01-28 ASSESSMENT — ANXIETY QUESTIONNAIRES
GAD7 TOTAL SCORE: 5
GAD7 TOTAL SCORE: 5
1. FEELING NERVOUS, ANXIOUS, OR ON EDGE: MORE THAN HALF THE DAYS
IF YOU CHECKED OFF ANY PROBLEMS ON THIS QUESTIONNAIRE, HOW DIFFICULT HAVE THESE PROBLEMS MADE IT FOR YOU TO DO YOUR WORK, TAKE CARE OF THINGS AT HOME, OR GET ALONG WITH OTHER PEOPLE: SOMEWHAT DIFFICULT
5. BEING SO RESTLESS THAT IT IS HARD TO SIT STILL: NOT AT ALL
6. BECOMING EASILY ANNOYED OR IRRITABLE: NOT AT ALL
8. IF YOU CHECKED OFF ANY PROBLEMS, HOW DIFFICULT HAVE THESE MADE IT FOR YOU TO DO YOUR WORK, TAKE CARE OF THINGS AT HOME, OR GET ALONG WITH OTHER PEOPLE?: SOMEWHAT DIFFICULT
2. NOT BEING ABLE TO STOP OR CONTROL WORRYING: SEVERAL DAYS
7. FEELING AFRAID AS IF SOMETHING AWFUL MIGHT HAPPEN: SEVERAL DAYS
4. TROUBLE RELAXING: NOT AT ALL
3. WORRYING TOO MUCH ABOUT DIFFERENT THINGS: SEVERAL DAYS

## 2025-01-29 ENCOUNTER — VIRTUAL VISIT (OUTPATIENT)
Dept: PSYCHOLOGY | Facility: CLINIC | Age: 70
End: 2025-01-29
Payer: COMMERCIAL

## 2025-01-29 DIAGNOSIS — F41.1 GENERALIZED ANXIETY DISORDER: Primary | ICD-10-CM

## 2025-01-29 PROCEDURE — 90837 PSYTX W PT 60 MINUTES: CPT | Mod: 95

## 2025-01-29 NOTE — PROGRESS NOTES
[facility]                                     Progress Note    Patient Name: Jolynn Haji  Date: 1/29/2025         Service Type: Individual      Session Start Time: 1:00 pm  Session End Time: 1:53 pm     Session Length: 53 min    Session #: 3 (previous therapy with Erika RAIN)    Attendees: Client attended alone    Service Modality:  Video Visit:      Provider verified identity through the following two step process.  Patient provided:  Patient address    Telemedicine Visit: The patient's condition can be safely assessed and treated via synchronous audio and visual telemedicine encounter.      Reason for Telemedicine Visit: Patient convenience (e.g. access to timely appointments / distance to available provider)    Originating Site (Patient Location): Patient's home    Distant Site (Provider Location): Provider Remote Setting- Home Office    Consent:  The patient/guardian has verbally consented to: the potential risks and benefits of telemedicine (video visit) versus in person care; bill my insurance or make self-payment for services provided; and responsibility for payment of non-covered services.     Patient would like the video invitation sent by:  My Chart    [facility] of [facility]:  [facility]    Distant Location (Provider):  Off-site    As the provider I attest to compliance with applicable laws and regulations related to telemedicine.    DATA  Interactive Complexity: No  Crisis: No        Progress Since Last Session (Related to Symptoms / Goals / Homework):   Symptoms: No change anxiety    Homework: Achieved / completed to satisfaction      Episode of Care Goals: Minimal progress - CONTEMPLATION (Considering change and yet undecided); Intervened by assessing the negative and positive thinking (ambivalence) about behavior change     Current / Ongoing Stressors and Concerns:   Current functioning, cognitive questions, overwhelm. Final transition off Venlafaxine     Treatment  Objective(s) Addressed in This Session:   Sleep hygiene  Grounding skills  Gratitude practice  Anxiety triggers     Intervention:   Interpersonal Therapy: provided active listening and validation of experience with stressors. Utilized rapport building, reinforced grounding skills, sleep hygiene strategies,GLAD gratitude practice. Surfaced situations triggering anxiety    Assessments completed prior to visit:  The following assessments were completed by patient for this visit:  PHQ2:       5/3/2023     1:18 PM 3/21/2023    11:20 AM 2/23/2023     7:15 PM 1/31/2023     3:21 PM 1/12/2023     4:28 PM 12/15/2022     9:12 PM 12/8/2022     9:38 AM   PHQ-2 ( 1999 [employer])   Q1: Little interest or pleasure in doing things   0 0 0  0   Q2: Feeling down, depressed or hopeless   0 0 0  0   PHQ-2 Score   0 0 0  0   Q1: Little interest or pleasure in doing things   Not at all Not at all Not at all  Not at all   Q2: Feeling down, depressed or hopeless   Not at all Not at all Not at all  Not at all   PHQ-2 Score Incomplete Incomplete 0 0 0 Incomplete 0     PHQ9:       11/18/2024     9:16 AM 12/2/2024     5:16 PM 12/10/2024     5:27 PM 12/12/2024     1:09 PM 12/17/2024     2:23 PM 1/1/2025    11:51 AM 1/8/2025    11:26 AM   PHQ-9 SCORE   PHQ-9 Total Score MyChart 1 (Minimal depression) 2 (Minimal depression) 2 (Minimal depression)  10 (Moderate depression) 8 (Mild depression) 11 (Moderate depression)   PHQ-9 Total Score 1  2  2  12 10  8  11        Patient-reported     GAD2:       9/8/2024     6:03 PM 9/23/2024     3:00 PM 10/8/2024     4:08 PM 11/18/2024     9:17 AM 12/2/2024     5:19 PM 12/28/2024     3:34 PM 1/28/2025     3:51 PM   TIFFANY-2   Feeling nervous, anxious, or on edge 1 1 1 1 2 3 3   Not being able to stop or control worrying 0 0 0 0 1 2 1   TIFFANY-2 Total Score 1 1    1 1 1  3  5  4        Patient-reported     GAD7:       10/14/2024    11:11 AM 12/2/2024     5:19 PM 12/12/2024     1:09 PM 12/17/2024     2:25 PM 1/6/2025      8:49 AM 1/8/2025    11:28 AM 1/28/2025     3:51 PM   TIFFANY-7 SCORE   Total Score 3 (minimal anxiety) 2 (minimal anxiety)  8 (mild anxiety) 9 (mild anxiety) 6 (mild anxiety) 5 (mild anxiety)   Total Score 3 2  5 8  9  6  5        Patient-reported     CAGE-AID:       6/6/2017    12:05 PM 5/12/2022     2:26 PM   CAGE-AID Total Score   Total Score  0    0   Total Score MyChart  0 (A total score of 2 or greater is considered clinically significant)       Information is confidential and restricted. Go to Review Flowsheets to unlock data.     PROMIS 10-Global Health (only subscores and total score):       2/25/2024     2:04 PM 5/27/2024     7:01 PM 6/9/2024     8:48 PM 6/23/2024     8:01 PM 9/23/2024     9:33 AM 10/8/2024     4:11 PM 12/28/2024     3:39 PM   PROMIS-10 Scores Only   Global Mental Health Score 17 17 17 17    17 17    17    17 18 12    Global Physical Health Score 17 19 19 19    19 18    18    18 17 12    PROMIS TOTAL - SUBSCORES 34 36 36 36    36 35    35    35 35 24        Patient-reported     [place] Suicide Severity Rating Scale (Lifetime/Recent)      5/12/2022    10:07 PM 9/27/2022     2:16 PM 2/28/2024    10:11 AM   [place] Suicide Severity Rating (Lifetime/Recent)   1. Wish to be Dead (Lifetime) N Y Y   Wish to be Dead Description (Lifetime)  fleeting thought of wishing she were dead    1. Wish to be Dead (Past 1 Month)  N N   2. Non-Specific Active Suicidal Thoughts (Lifetime) N N N   Most Severe Ideation Rating (Lifetime)  1 1   Most Severe Ideation Rating (Past 1 Month)  1 1   Frequency (Lifetime)  1 1   Frequency (Past 1 Month)  1    Duration (Lifetime)  1 1   Duration (Past 1 Month)  1    Controllability (Lifetime)  1    Controllability (Past 1 Month)  1    Deterrents (Lifetime)  1    Deterrents (Past 1 Month)  1    Reasons for Ideation (Lifetime)  4 5   Reasons for Ideation (Past 1 Month)  4 0   Actual Attempt (Lifetime) N N N   Has subject engaged in non-suicidal self-injurious behavior?  (Lifetime) N N N   Interrupted Attempts (Lifetime) N N N   Aborted or Self-Interrupted Attempt (Lifetime) N N N   Preparatory Acts or Behavior (Lifetime) N N N   Calculated C-SSRS Risk Score (Lifetime/Recent) No Risk Indicated No Risk Indicated No Risk Indicated         ASSESSMENT: Current Emotional / Mental Status (status of significant symptoms):   Risk status (Self / Other harm or suicidal ideation)   Patient denies current fears or concerns for personal safety.   Patient denies current or recent suicidal ideation or behaviors.   Patient denies current or recent homicidal ideation or behaviors.   Patient denies current or recent self injurious behavior or ideation.   Patient denies other safety concerns.   Patient reports there has been no change in risk factors since their last session.     Patient reports there has been no change in protective factors since their last session.     Recommended that patient call 911 or go to the local ED should there be a change in any of these risk factors     Appearance:   Appropriate    Eye Contact:   Fair    Psychomotor Behavior: Normal  Restless    Attitude:   Cooperative    Orientation:   All   Speech    Rate / Production: Hyperverbal  Normal     Volume:  Normal    Mood:    Anxious  Normal   Affect:    Appropriate    Thought Content:  Clear    Thought Form:  Coherent  Logical  Circumstantial   Insight:    Fair      Medication Review:   No changes to current psychiatric medication(s)     Medication Compliance:   Yes     Changes in Health Issues:   None reported     Chemical Use Review:   Substance Use: Chemical use reviewed, no active concerns identified      Tobacco Use: No current tobacco use.      Diagnosis:  1. Generalized anxiety disorder      Collateral Reports Completed:   Not Applicable    PLAN: (Patient Tasks / Therapist Tasks / Other)  Attend to mindfulness grounding practices, GLAD gratitude, sleep hygiene, notice anxiety triggers        DINESH Campbell   1/22/25                                                         ______________________________________________________________________

## 2025-01-31 SDOH — HEALTH STABILITY: PHYSICAL HEALTH: ON AVERAGE, HOW MANY MINUTES DO YOU ENGAGE IN EXERCISE AT THIS LEVEL?: 30 MIN

## 2025-01-31 SDOH — HEALTH STABILITY: PHYSICAL HEALTH: ON AVERAGE, HOW MANY DAYS PER WEEK DO YOU ENGAGE IN MODERATE TO STRENUOUS EXERCISE (LIKE A BRISK WALK)?: 2 DAYS

## 2025-01-31 ASSESSMENT — SOCIAL DETERMINANTS OF HEALTH (SDOH): HOW OFTEN DO YOU GET TOGETHER WITH FRIENDS OR RELATIVES?: TWICE A WEEK

## 2025-02-05 DIAGNOSIS — R25.1 TREMOR: ICD-10-CM

## 2025-02-05 DIAGNOSIS — F41.1 GAD (GENERALIZED ANXIETY DISORDER): ICD-10-CM

## 2025-02-05 RX ORDER — PROPRANOLOL HYDROCHLORIDE 60 MG/1
60 CAPSULE, EXTENDED RELEASE ORAL DAILY
Qty: 90 CAPSULE | Refills: 3 | Status: SHIPPED | OUTPATIENT
Start: 2025-02-05

## 2025-02-05 ASSESSMENT — PATIENT HEALTH QUESTIONNAIRE - PHQ9
SUM OF ALL RESPONSES TO PHQ QUESTIONS 1-9: 7
10. IF YOU CHECKED OFF ANY PROBLEMS, HOW DIFFICULT HAVE THESE PROBLEMS MADE IT FOR YOU TO DO YOUR WORK, TAKE CARE OF THINGS AT HOME, OR GET ALONG WITH OTHER PEOPLE: VERY DIFFICULT
SUM OF ALL RESPONSES TO PHQ QUESTIONS 1-9: 7

## 2025-02-05 NOTE — TELEPHONE ENCOUNTER
Called #   Telephone Information:   Mobile [phone]     Pt is taking the 60 mg propranolol  daily     Removed the 10 mg off med list as requested by pt         Shani Bunch RN, BSN  [facility]

## 2025-02-06 ENCOUNTER — OFFICE VISIT (OUTPATIENT)
Dept: FAMILY MEDICINE | Facility: CLINIC | Age: 70
End: 2025-02-06
Payer: COMMERCIAL

## 2025-02-06 VITALS
HEIGHT: 69 IN | HEART RATE: 75 BPM | SYSTOLIC BLOOD PRESSURE: 136 MMHG | WEIGHT: 135.7 LBS | OXYGEN SATURATION: 97 % | RESPIRATION RATE: 16 BRPM | TEMPERATURE: 97.5 F | DIASTOLIC BLOOD PRESSURE: 80 MMHG | BODY MASS INDEX: 20.1 KG/M2

## 2025-02-06 DIAGNOSIS — Z00.00 ENCOUNTER FOR MEDICARE ANNUAL WELLNESS EXAM: Primary | ICD-10-CM

## 2025-02-06 DIAGNOSIS — F33.1 MAJOR DEPRESSIVE DISORDER, RECURRENT EPISODE, MODERATE (H): ICD-10-CM

## 2025-02-06 DIAGNOSIS — E78.5 HYPERLIPIDEMIA LDL GOAL <130: ICD-10-CM

## 2025-02-06 DIAGNOSIS — L71.9 ROSACEA: ICD-10-CM

## 2025-02-06 DIAGNOSIS — I10 ESSENTIAL HYPERTENSION WITH GOAL BLOOD PRESSURE LESS THAN 140/90: ICD-10-CM

## 2025-02-06 DIAGNOSIS — F43.23 ADJUSTMENT DISORDER WITH MIXED ANXIETY AND DEPRESSED MOOD: ICD-10-CM

## 2025-02-06 DIAGNOSIS — Z78.0 ASYMPTOMATIC MENOPAUSE: ICD-10-CM

## 2025-02-06 DIAGNOSIS — F41.1 GAD (GENERALIZED ANXIETY DISORDER): ICD-10-CM

## 2025-02-06 DIAGNOSIS — E55.9 VITAMIN D DEFICIENCY: ICD-10-CM

## 2025-02-06 DIAGNOSIS — R51.9 CHRONIC DAILY HEADACHE: ICD-10-CM

## 2025-02-06 PROBLEM — D04.9 SQUAMOUS CELL CARCINOMA IN SITU (SCCIS) OF SKIN: Status: ACTIVE | Noted: 2023-07-12

## 2025-02-06 LAB
ALBUMIN SERPL BCG-MCNC: 4.5 G/DL (ref 3.5–5.2)
ALP SERPL-CCNC: 77 U/L (ref 40–150)
ALT SERPL W P-5'-P-CCNC: 21 U/L (ref 0–50)
AST SERPL W P-5'-P-CCNC: 22 U/L (ref 0–45)
BILIRUB DIRECT SERPL-MCNC: <0.2 MG/DL (ref 0–0.3)
BILIRUB SERPL-MCNC: 0.5 MG/DL
CHOLEST SERPL-MCNC: 219 MG/DL
ERYTHROCYTE [DISTWIDTH] IN BLOOD BY AUTOMATED COUNT: 13 % (ref 10–15)
FASTING STATUS PATIENT QL REPORTED: NO
HCT VFR BLD AUTO: 41.3 % (ref 35–47)
HDLC SERPL-MCNC: 74 MG/DL
HGB BLD-MCNC: 13.9 G/DL (ref 11.7–15.7)
LDLC SERPL CALC-MCNC: 129 MG/DL
MCH RBC QN AUTO: 30.5 PG (ref 26.5–33)
MCHC RBC AUTO-ENTMCNC: 33.7 G/DL (ref 31.5–36.5)
MCV RBC AUTO: 91 FL (ref 78–100)
NONHDLC SERPL-MCNC: 145 MG/DL
PLATELET # BLD AUTO: 233 10E3/UL (ref 150–450)
PROT SERPL-MCNC: 7.1 G/DL (ref 6.4–8.3)
RBC # BLD AUTO: 4.56 10E6/UL (ref 3.8–5.2)
TRIGL SERPL-MCNC: 81 MG/DL
TSH SERPL DL<=0.005 MIU/L-ACNC: 1.49 UIU/ML (ref 0.3–4.2)
WBC # BLD AUTO: 6.8 10E3/UL (ref 4–11)

## 2025-02-06 RX ORDER — CLINDAMYCIN PHOSPHATE 10 UG/ML
LOTION TOPICAL 2 TIMES DAILY
Qty: 60 ML | Refills: 3 | Status: SHIPPED | OUTPATIENT
Start: 2025-02-06

## 2025-02-06 NOTE — PROGRESS NOTES
Preventive Care Visit  [facility] [place]  Helga Ibarra MD, Family Medicine  Feb 6, 2025      Assessment & Plan       ICD-10-CM    1. Encounter for Medicare annual wellness exam  Z00.00 PRIMARY CARE FOLLOW-UP SCHEDULING      2. Major depressive disorder, recurrent episode, moderate (H)  F33.1 CBC with platelets     CBC with platelets      3. Essential hypertension with goal blood pressure less than 140/90 - then Orthostatic hypotension- resolved off of lisinopril 2.5mg daily and doing daily 2.25 mile walks with her dog  I10 TSH with free T4 reflex     CBC with platelets     Albumin Random Urine Quantitative with Creat Ratio     TSH with free T4 reflex     CBC with platelets      4. Adjustment disorder with mixed anxiety and depressed mood- mild  at this time  F43.23 TSH with free T4 reflex     TSH with free T4 reflex      5. Hyperlipidemia LDL goal <130  E78.5 Lipid panel reflex to direct LDL Non-fasting     Albumin Random Urine Quantitative with Creat Ratio     Hepatic panel (Albumin, ALT, AST, Bili, Alk Phos, TP)     Lipid panel reflex to direct LDL Non-fasting     Hepatic panel (Albumin, ALT, AST, Bili, Alk Phos, TP)      6. TIFFANY (generalized anxiety disorder)  F41.1       7. Chronic daily headache- much improved with medicatin change off of venlafaxine  R51.9 CBC with platelets     CBC with platelets      8. Rosacea - needs medication refill - desires to change from clindamycin 1% gel to cream  L71.9 clindamycin (CLEOCIN T) 1 % external lotion      9. Asymptomatic menopause  Z78.0 DX Bone Density      10. Vitamin D deficiency  E55.9 25 Hydroxyvitamin D2 and D3     25 Hydroxyvitamin D2 and D3        Return in about 27 days (around 3/5/2025) for depression, anxiety, w/ Dr. SUE for 40 min appt, in person. - appt made. Pt requests to be seen in person by me in approx 1 month for update on her mental health.      Patient has been advised of split billing requirements and indicates  understanding: Yes    Please, call our clinic or go to the ER immediately if signs or symptoms worsen or fail to improve as anticipated.     Continue lisinopril 2.5mg prn.       Continue current medications.     Counseling  Appropriate preventive services were addressed with this patient via screening, questionnaire, or discussion as appropriate for fall prevention, nutrition, physical activity, Tobacco-use cessation, social engagement, weight loss and cognition.  Checklist reviewing preventive services available has been given to the patient.  Reviewed patient's diet, addressing concerns and/or questions.   She is at risk for lack of exercise and has been provided with information to increase physical activity for the benefit of her well-being.   She is at risk for psychosocial distress and has been provided with information to reduce risk.   The patient's PHQ-9 score is consistent with mild depression. She was provided with information regarding depression.   I have reviewed Opioid Use Disorder and Substance Use Disorder risk factors and made any needed referrals.     Return in about 27 days (around 3/5/2025) for depression, anxiety, w/  V for 40 min appt, in person.     Future Appointments 2/6/2025 - 8/5/2025        Date Visit Type Length Department Provider     2/17/2025  4:00 PM ADULT PSYCHOTHERAPY RETURN 60 min FCC Mica Poole, LICDHAVAL              2/21/2025  2:30 PM [facility] ADULT PSYCHIATRY RETURN 30 min [facility] PSYCHIATRY Judy Pompa DO    Location Instructions:     [facility] has 2 buildings on its campus. Please visit us at [address] [place] [address] and enter the building with the numbers [id number] on it.               2/27/2025  1:30 PM MA SCREENING BILATERAL W/ AVERY 15 min  BREAST CENTER [id number]    Location Instructions:     [facility] Building [address], [address] [place] [address]  Parking Breast  Center customers can park in the lot adjacent to the entrance to the [facility].  Entrance and check-in location Enter at the front entrance, under the canopy. Take the stairs or elevator from the main entrance to the 2nd floor. Please check-in for your appointment at the desk in the Breast Center waiting area.  This appointment is in a hospital-based location.&nbsp; Before your visit, you may want to check with your insurance company for coverage and referral options, including cost differences between services provided in different clinic settings.&nbsp; For more information visit this link on the [employer] Website:&nbsp; [email or web address]              3/3/2025  2:00 PM ADULT PSYCHOTHERAPY RETURN 60 min [facility] Mica Poole LICSW              3/4/2025 10:45 AM RETURN DERMATOLOGY 15 min OX DERMATOLOGY Ligia Carbajal PA-C    Location Instructions:     [address] [place] [id number]              3/6/2025  1:40 PM CT HEAD WO 20 min RH CT SCAN [facility] [id number]    Location Instructions:     [facility] [address] [place] [address]  Parking Imaging customers can park either in the lot to the right side of the driveway (opposite the parking ramp) as you approach the Specialty Care Center, or in the parking ramp.  Entrance and check-in location Enter at the front entrance (pictured to the right). As you enter the lobby, the Imaging Center is on the first floor, just past the elevators. Please check-in for your appointment at the desk in the Imaging Center waiting area, [address]  This appointment is in a hospital-based location.&nbsp; Before your visit, you may want to check with your insurance company for coverage and referral options, including cost differences between services provided in different clinic settings.&nbsp; For more information visit this link on the [employer] Website:&nbsp;  [email or web address]              3/24/2025  1:00 PM ADULT PSYCHOTHERAPY RETURN 60 min [facility]                      MEDICATIONS:   Orders Placed This Encounter   Medications    clindamycin (CLEOCIN T) 1 % external lotion     Sig: Apply topically 2 times daily. For acne flares     Dispense:  60 mL     Refill:  3          - Continue other medications without change  Regular exercise  See Patient Instructions.         Helga Ibarra MD      Chanelle Tristan is a 69 year old, presenting for the following:  Physical  and the following other medical problems:      1. Encounter for Medicare annual wellness exam    2. Major depressive disorder, recurrent episode, moderate (H)    3. Essential hypertension with goal blood pressure less than 140/90 - then Orthostatic hypotension- resolved off of lisinopril 2.5mg daily and doing daily 2.25 mile walks with her dog    4. Adjustment disorder with mixed anxiety and depressed mood- mild  at this time    5. Hyperlipidemia LDL goal <130- recheck labs    6. TIFFANY (generalized anxiety disorder)    7. Chronic daily headache- much improved with medicatin change off of venlafaxine    8. Rosacea - needs medication refill - desires to change from clindamycin 1% gel to cream    9. Asymptomatic menopause    10. Vitamin D deficiency- recheck labs            2/6/2025    10:16 AM   Additional Questions   Roomed by Carito COYNE   Accompanied by Self         HPI    Hypertension Follow-up: taking lisinopril 2.5mg as needed for BP >140/90.  Hasn't need to take it lately.   Checks BP 3-4x/week up to daily.      Do you check your blood pressure regularly outside of the clinic? Yes - 111-133/68-78   Are you following a low salt diet? No  Are your blood pressures ever more than 140 on the top number (systolic) OR more   than 90 on the bottom number (diastolic), for example 140/90? Yes      Depression and Anxiety : Saw Dr. Pompa , her psychiatrist , recently and had a med  change.  No more headaches on a daily basis as with new med - sertraline - now up to 100mg daily and trazodone 25-50mg at night for sleep.  Seeing new therapist - Mica now - really likes her.   Has follow up scheduled with both of them.     How are you doing with your depression since your last visit? No change  How are you doing with your anxiety since your last visit?  No change  Are you having other symptoms that might be associated with depression or anxiety? Yes:     Have you had a significant life event? No   Do you have any concerns with your use of alcohol or other drugs? No    Social History     Tobacco Use    Smoking status: Never     Passive exposure: Never    Smokeless tobacco: Never   Vaping Use    Vaping status: Never Used   Substance Use Topics    Alcohol use: Yes     Alcohol/week: 0.0 - 1.0 standard drinks of alcohol     Comment: 2 per month    Drug use: No     Comment: no herbal meds either         1/1/2025    11:51 AM 1/8/2025    11:26 AM 2/5/2025    11:26 AM   PHQ   PHQ-9 Total Score 8  11  7    Q9: Thoughts of better off dead/self-harm past 2 weeks Several days Several days Not at all   F/U: Thoughts of suicide or self-harm No No    F/U: Safety concerns No No        Patient-reported         1/6/2025     8:49 AM 1/8/2025    11:28 AM 1/28/2025     3:51 PM   TIFFANY-7 SCORE   Total Score 9 (mild anxiety) 6 (mild anxiety) 5 (mild anxiety)   Total Score 9  6  5        Patient-reported         2/5/2025    11:26 AM   Last PHQ-9   1.  Little interest or pleasure in doing things 2   2.  Feeling down, depressed, or hopeless 1   3.  Trouble falling or staying asleep, or sleeping too much 0   4.  Feeling tired or having little energy 1   5.  Poor appetite or overeating 2   6.  Feeling bad about yourself 0   7.  Trouble concentrating 1   8.  Moving slowly or restless 0   Q9: Thoughts of better off dead/self-harm past 2 weeks 0   PHQ-9 Total Score 7        Patient-reported         1/28/2025     3:51 PM   TIFFANY-7   "  1. Feeling nervous, anxious, or on edge 2   2. Not being able to stop or control worrying 1   3. Worrying too much about different things 1   4. Trouble relaxing 0   5. Being so restless that it is hard to sit still 0   6. Becoming easily annoyed or irritable 0   7. Feeling afraid, as if something awful might happen 1   TIFFANY-7 Total Score 5    If you checked any problems, how difficult have they made it for you to do your work, take care of things at home, or get along with other people? Somewhat difficult       Patient-reported         Health Care Directive\"   Patient does not have a Health Care Directive: Discussed advance care planning with patient; however, patient declined at this time.      1/31/2025   General Health   How would you rate your overall physical health? (!) FAIR   Feel stress (tense, anxious, or unable to sleep) Very much   (!) STRESS CONCERN      1/31/2025   Nutrition   Diet: Regular (no restrictions)         1/31/2025   Exercise   Days per week of moderate/strenous exercise 2 days   Average minutes spent exercising at this level 30 min   (!) EXERCISE CONCERN      1/31/2025   Social Factors   Frequency of gathering with friends or relatives Twice a week   Worry food won't last until get money to buy more No   Food not last or not have enough money for food? No   Do you have housing? (Housing is defined as stable permanent housing and does not include staying ouside in a car, in a tent, in an abandoned building, in an overnight shelter, or couch-surfing.) Yes   Are you worried about losing your housing? No   Lack of transportation? No   Unable to get utilities (heat,electricity)? No         1/31/2025   Fall Risk   Fallen 2 or more times in the past year? No   Trouble with walking or balance? No          1/31/2025   Activities of Daily Living- Home Safety   Needs help with the following daily activites None of the above   Safety concerns in the home None of the above         1/31/2025   Dental "   Dentist two times every year? Yes         1/31/2025   Hearing Screening   Hearing concerns? None of the above         1/31/2025   Driving Risk Screening   Patient/family members have concerns about driving No         1/31/2025   General Alertness/Fatigue Screening   Have you been more tired than usual lately? No         1/31/2025   Urinary Incontinence Screening   Bothered by leaking urine in past 6 months No         1/31/2025   TB Screening   Were you born outside of the [place]? No       Today's PHQ-9 Score:       2/5/2025    11:26 AM   PHQ-9 SCORE   PHQ-9 Total Score MyChart 7 (Mild depression)   PHQ-9 Total Score 7        Patient-reported         1/31/2025   Substance Use   Alcohol more than 3/day or more than 7/wk Not Applicable   Do you have a current opioid prescription? (!) YES   How severe/bad is pain from 1 to 10? 0/10 (No Pain)   Do you use any other substances recreationally? No       Social History     Tobacco Use    Smoking status: Never     Passive exposure: Never    Smokeless tobacco: Never   Vaping Use    Vaping status: Never Used   Substance Use Topics    Alcohol use: Yes     Alcohol/week: 0.0 - 1.0 standard drinks of alcohol     Comment: 2 per month    Drug use: No     Comment: no herbal meds either           1/10/2024   LAST FHS-7 RESULTS   1st degree relative breast or ovarian cancer No   Any relative bilateral breast cancer No   Any male have breast cancer No   Any ONE woman have BOTH breast AND ovarian cancer No   Any woman with breast cancer before 50yrs No   2 or more relatives with breast AND/OR ovarian cancer No   2 or more relatives with breast AND/OR bowel cancer No     Mammogram Screening - Mammogram every 1-2 years updated in Health Maintenance based on mutual decision making      History of abnormal Pap smear: No - age 65 or older with adequate negative prior screening test results (3 consecutive negative cytology results, 2 consecutive negative cotesting results, or 2 consecutive  negative HrHPV test results within 10 years, with the most recent test occurring within the recommended screening interval for the test used)        Latest Ref Rng & Units 2019     2:06 PM 2019     3:40 PM 2016    12:00 AM   PAP / HPV   PAP (Historical)   NIL  NIL    HPV 16 DNA NEG^Negative Negative      HPV 18 DNA NEG^Negative Negative      Other HR HPV NEG^Negative Negative        ASCVD Risk   The 10-year ASCVD risk score (Luca DK, et al., 2019) is: 13.4%    Values used to calculate the score:      Age: 69 years      Sex: Female      Is Non- : No      Diabetic: No      Tobacco smoker: No      Systolic Blood Pressure: 136 mmHg      Is BP treated: Yes      HDL Cholesterol: 74 mg/dL      Total Cholesterol: 203 mg/dL    Due for recheck DEXA scan after 2025 - ordered - to recheck her osteopenia.     Reviewed and updated as needed this visit by Provider                    Past Medical History:   Diagnosis Date    Abnormal glandular Papanicolaou smear of cervix- ASCUS in 2003    ASC H- 2000    Basal cell carcinoma of leg, right 2016    Dr Rogers    Colon polyps 2018    tubular adenoam - due 5 yrs - 2 mm    Concussion without loss of consciousness 2017    Hidradenitis     left  groin    HSIL (high grade squamous intraepithelial lesion) on Pap smear of cervix 2000    Normal paps from  to /    Hypertension goal BP (blood pressure) < 140/90     OA (osteoarthritis) of knee     moderate    Postmenopausal since 2008     Shingles 2010    left     Squamous cell carcinoma of skin, unspecified     Synovial cyst of popliteal space      Past Surgical History:   Procedure Laterality Date    COLONOSCOPY  2018    polyps, tubular - 2 mm - due 5 yrs    COLONOSCOPY N/A 2023    Procedure: Colonoscopy;  Surgeon: Yogesh Cedeño MD;  Location: [facility] NONSPECIFIC PROCEDURE      Colposcopy     OB History    Para Term   AB Living   4 3 2 0 1 2   SAB IAB Ectopic Multiple Live Births   1 0 0 0 0      # Outcome Date GA Lbr Skyler/2nd Weight Sex Type Anes PTL Lv   4 SAB            3 Para            2 Term            1 Term               Obstetric Comments   [facility]    Children Robbie : 1986 and Malini : 1983- both type 1 diabetics from childhood      Lab work is in process  Labs reviewed in EPIC  BP Readings from Last 3 Encounters:   25 136/80   25 136/64   24 120/86    Wt Readings from Last 3 Encounters:   25 61.6 kg (135 lb 11.2 oz)   25 62.6 kg (138 lb)   25 64 kg (141 lb)                  Patient Active Problem List   Diagnosis    Essential hypertension with goal blood pressure less than 140/90 - then Orthostatic hypotension- resolved off of lisinopril 2.5mg daily and doing daily 2.25 mile walks with her dog    Hidradenitis    Localized osteoarthritis of hand    Synovial cyst of popliteal space    DERMATOPHYTOSIS OF NAIL- toenails     Lipidosis    Osteopenia    Postmenopausal    NUMBNESS AND TINGLING OF FOOT - bilateral -mild     Numbness of feet- distal feet R>L     Stress incontinence, female - mild    Cough    Acute bronchitis- recurrent- ? related to mold in school building- pt has since retired and no further bronchitis    Rosacea    History of migraine headaches- assoc. with nausea/vomiting - resolved around menopause - were  premenstrual     Dupuytren's disease of palm - right 4th digit flexor tendon    Sun-damaged skin    Seasonal allergic rhinitis    Basal cell carcinoma of leg, right    Adjustment disorder with mixed anxiety and depressed mood- mild  at this time    Family history of celiac disease    TIFFANY (generalized anxiety disorder)    Insomnia, unspecified type    Chronic constipation    Raynaud's phenomenon without gangrene    Other secondary osteoarthritis of left knee    Primary localized osteoarthrosis of left lower leg    Hyperlipidemia LDL goal <130     Basal cell carcinoma    Colon polyps    Screening for cervical cancer    Adjustment insomnia - resolved as of 2023 - off of ambien with psychiatry's okay     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 - much improved - resolved as of 2023     Drug-induced parkinsonism (H)- Abilify - generic = ARIPiprazole( 5mg) - RESOLVED OFF ABILIFY     Mood disorder    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 - lower pretibial area- has follow up with Dermatology 3/4/2025    Bilateral lower extremity edema- trace pitting edema distal LE to mid pretibial areas Left > right    Tremor    Major depressive disorder, recurrent, mild    Orthostatic hypotension- resolved off of lisinopril 2.5mg daily and doing daily 2.25 mile walks with her dog    Moderate recurrent major depression (H)     Past Surgical History:   Procedure Laterality Date    COLONOSCOPY  2018    polyps, tubular - 2 mm - due 5 yrs    COLONOSCOPY N/A 2023    Procedure: Colonoscopy;  Surgeon: Yogesh Cedeño MD;  Location: [facility] NONSPECIFIC PROCEDURE      Colposcopy       Social History     Tobacco Use    Smoking status: Never     Passive exposure: Never    Smokeless tobacco: Never   Substance Use Topics    Alcohol use: Yes     Alcohol/week: 0.0 - 1.0 standard drinks of alcohol     Comment: 2 per month     Family History   Problem Relation Age of Onset    Thyroid Disease Mother         Graves disease    Hypertension Mother     Diabetes Mother         type 2    Blood Disease Mother          from leukemia at age 78    Heart Disease Father 62        MV problem with CHF  at 62/ from MI    Hypertension Father     Diabetes Son         Juvenile Diabetes    Diabetes Daughter         Juvenile Diabetes    Cancer Maternal Uncle         type ??    Cancer Maternal Uncle         liver    Colon Cancer No family hx of          Current  Outpatient Medications   Medication Sig Dispense Refill    clindamycin (CLEOCIN T) 1 % external lotion Apply topically 2 times daily. For acne flares 60 mL 3    lactobacillus rhamnosus, GG, (CULTURELL) capsule Take 1 capsule by mouth daily Taking every other day      lisinopril (ZESTRIL) 2.5 MG tablet Take 1 tablet (2.5 mg) by mouth daily as needed (BP >135/85). 90 tablet 3    propranolol ER (INDERAL LA) 60 MG 24 hr capsule TAKE ONE CAPSULE BY MOUTH ONCE DAILY 90 capsule 3    sertraline (ZOLOFT) 100 MG tablet Take 1 tablet (100 mg) by mouth daily. 90 tablet 0    traZODone (DESYREL) 50 MG tablet Take 0.5-1 tablets (25-50 mg) by mouth nightly as needed for sleep. 90 tablet 1     Allergies   Allergen Reactions    Abilify [Aripiprazole] Other (See Comments)     Suspected drug-induced Parkinsonism     Fosamax Diarrhea     Diarrhea, stomach cramps, jaw pain     Amoxicillin-Pot Clavulanate Itching and Rash    Amoxicillin-Pot Clavulanate Itching and Rash     Recent Labs   Lab Test 01/09/25  1202 02/05/24  1033 09/27/23  1739 02/08/23  1035 04/25/22  1642 01/31/22  0952 01/13/21  0823 12/11/19  1129 05/11/17  1225 04/24/17  1604   A1C  --   --   --   --   --   --   --   --   --  5.5   LDL  --  116*  --  129*  --  110* 123* 115*   < >  --    HDL  --  74  --  81  --  82 75 76   < >  --    TRIG  --  67  --  60  --  81 65 71   < >  --    ALT  --  16 15 19   < > 19 17 20   < > 22   CR 0.72 0.68 0.78 0.62   < > 0.68 0.68 0.70   < > 0.61   GFRESTIMATED 89 >90 82 >90   < > >90 >90 90   < > >90  Non  GFR Calc     GFRESTBLACK  --   --   --   --   --   --  >90 >90   < > >90  African American GFR Calc     POTASSIUM 4.4 4.7 4.6 4.1   < > 4.1 4.1 4.8   < > 3.9   TSH  --  1.26  --  1.65   < > 1.53 2.07 0.96   < >  --     < > = values in this interval not displayed.      Current providers sharing in care for this patient include:  Patient Care Team:  Helga Ibarra MD as PCP - Kim Mittal APRN CNS  "as Nurse Practitioner (Clinical Nurse Specialist)  Helga Ibarra MD as Assigned PCP  Ligia Carbajal PA-C as Physician Assistant (Dermatology)  Sheldon Rogers MD as Assigned Surgical Provider  Judy Pompa DO as Assigned Behavioral Health Provider    The following health maintenance items are reviewed in Epic and correct as of today:  Health Maintenance   Topic Date Due    MAMMO SCREENING  01/10/2025    MEDICARE ANNUAL WELLNESS VISIT  02/05/2025    COVID-19 Vaccine (8 - 2024-25 season) 03/04/2025    PHQ-9  08/06/2025    ANNUAL REVIEW OF HM ORDERS  12/19/2025    BMP  01/09/2026    MICROALBUMIN  01/09/2026    FALL RISK ASSESSMENT  02/06/2026    DEXA  04/04/2026    GLUCOSE  01/09/2028    COLORECTAL CANCER SCREENING  08/25/2028    LIPID  02/05/2029    ADVANCE CARE PLANNING  02/06/2030    DTAP/TDAP/TD IMMUNIZATION (3 - Td or Tdap) 01/13/2031    HEPATITIS C SCREENING  Completed    DEPRESSION ACTION PLAN  Completed    INFLUENZA VACCINE  Completed    Pneumococcal Vaccine: 50+ Years  Completed    ZOSTER IMMUNIZATION  Completed    RSV VACCINE  Completed    HPV IMMUNIZATION  Aged Out    MENINGITIS IMMUNIZATION  Aged Out         Review of Systems  Constitutional, HEENT, cardiovascular, pulmonary, GI, , musculoskeletal, neuro, skin, endocrine and psych systems are negative, except as otherwise noted.     Objective    Exam  /80   Pulse 75   Temp 97.5  F (36.4  C) (Tympanic)   Resp 16   Ht 1.765 m (5' 9.49\")   Wt 61.6 kg (135 lb 11.2 oz)   LMP 06/28/2001   SpO2 97%   BMI 19.76 kg/m     Estimated body mass index is 19.76 kg/m  as calculated from the following:    Height as of this encounter: 1.765 m (5' 9.49\").    Weight as of this encounter: 61.6 kg (135 lb 11.2 oz).    Physical Exam:   GENERAL: alert and no distress  EYES: Eyes grossly normal to inspection, PERRL and conjunctivae and sclerae normal  HENT: ear canals and TM's normal, nose and mouth without ulcers or lesions  NECK: no " adenopathy, no asymmetry, masses, or scars  RESP: lungs clear to auscultation - no rales, rhonchi or wheezes  BREAST: normal without masses, tenderness or nipple discharge and no palpable axillary masses or adenopathy  CV: regular rate and rhythm, normal S1 S2, no S3 or S4, no murmur, click or rub, no peripheral edema  ABDOMEN: soft, nontender, no hepatosplenomegaly, no masses and bowel sounds normal  MS: no gross musculoskeletal defects noted, no edema  SKIN: no suspicious lesions or rashes  NEURO: Normal strength and tone, mentation intact and speech normal  PSYCH: mentation appears normal, affect slightly blunted. Alert and oriented. No acute distress. Appears well-groomed and casually dressed. Affect is mildly  depressed. In good humor thought and laughs appropriately. Mildly anxious. No evidence of psychosis.     Office Visit on 01/09/2025   Component Date Value Ref Range Status    Sodium 01/09/2025 138  135 - 145 mmol/L Final    Potassium 01/09/2025 4.4  3.4 - 5.3 mmol/L Final    Chloride 01/09/2025 101  98 - 107 mmol/L Final    Carbon Dioxide (CO2) 01/09/2025 26  22 - 29 mmol/L Final    Anion Gap 01/09/2025 11  7 - 15 mmol/L Final    Urea Nitrogen 01/09/2025 22.3  8.0 - 23.0 mg/dL Final    Creatinine 01/09/2025 0.72  0.51 - 0.95 mg/dL Final    GFR Estimate 01/09/2025 89  >60 mL/min/1.73m2 Final    eGFR calculated using 2021 CKD-EPI equation.    Calcium 01/09/2025 9.7  8.8 - 10.4 mg/dL Final    Reference intervals for this test were updated on 7/16/2024 to reflect our healthy population more accurately. There may be differences in the flagging of prior results with similar values performed with this method. Those prior results can be interpreted in the context of the updated reference intervals.    Glucose 01/09/2025 99  70 - 99 mg/dL Final    Creatinine Urine mg/dL 01/09/2025 127.0  mg/dL Final    The reference ranges have not been established in urine creatinine. The results should be integrated into the  clinical context for interpretation.    Albumin Urine mg/L 01/09/2025 <12.0  mg/L Final    The reference ranges have not been established in urine albumin. The results should be integrated into the clinical context for interpretation.    Albumin Urine mg/g Cr 01/09/2025    Final    Unable to calculate, urine albumin and/or urine creatinine is outside detectable limits.  Microalbuminuria is defined as an albumin:creatinine ratio of 17 to 299 for males and 25 to 299 for females. A ratio of albumin:creatinine of 300 or higher is indicative of overt proteinuria.  Due to biologic variability, positive results should be confirmed by a second, first-morning random or 24-hour timed urine specimen. If there is discrepancy, a third specimen is recommended. When 2 out of 3 results are in the microalbuminuria range, this is evidence for incipient nephropathy and warrants increased efforts at glucose control, blood pressure control, and institution of therapy with an angiotensin-converting-enzyme (ACE) inhibitor (if the patient can tolerate it).       Discussed above with pt today.All of your labs are normal, improved, or pretty stable from previous.     Please,  continue your current medications and/or supplements and follow up as we discussed at your last visit.            2/6/2025   Mini Cog   Mini-Cog Not Completed (choose reason) Patient declines     Pt declines secondary to her current depression and brain fog feelings.        Signed Electronically by: Helga Ibarra MD

## 2025-02-06 NOTE — PATIENT INSTRUCTIONS
Patient Education   Preventive Care Advice   This is general advice given by our system to help you stay healthy. However, your care team may have specific advice just for you. Please talk to your care team about your preventive care needs.  Nutrition  Eat 5 or more servings of fruits and vegetables each day.  Try wheat bread, brown rice and whole grain pasta (instead of white bread, rice, and pasta).  Get enough calcium and vitamin D. Check the label on foods and aim for 100% of the RDA (recommended daily allowance).  Lifestyle  Exercise at least 150 minutes each week  (30 minutes a day, 5 days a week).  Do muscle strengthening activities 2 days a week. These help control your weight and prevent disease.  No smoking.  Wear sunscreen to prevent skin cancer.  Have a dental exam and cleaning every 6 months.  Yearly exams  See your health care team every year to talk about:  Any changes in your health.  Any medicines your care team has prescribed.  Preventive care, family planning, and ways to prevent chronic diseases.  Shots (vaccines)   HPV shots (up to age 26), if you've never had them before.  Hepatitis B shots (up to age 59), if you've never had them before.  COVID-19 shot: Get this shot when it's due.  Flu shot: Get a flu shot every year.  Tetanus shot: Get a tetanus shot every 10 years.  Pneumococcal, hepatitis A, and RSV shots: Ask your care team if you need these based on your risk.  Shingles shot (for age 50 and up)  General health tests  Diabetes screening:  Starting at age 35, Get screened for diabetes at least every 3 years.  If you are younger than age 35, ask your care team if you should be screened for diabetes.  Cholesterol test: At age 39, start having a cholesterol test every 5 years, or more often if advised.  Bone density scan (DEXA): At age 50, ask your care team if you should have this scan for osteoporosis (brittle bones).  Hepatitis C: Get tested at least once in your life.  STIs (sexually  transmitted infections)  Before age 24: Ask your care team if you should be screened for STIs.  After age 24: Get screened for STIs if you're at risk. You are at risk for STIs (including HIV) if:  You are sexually active with more than one person.  You don't use condoms every time.  You or a partner was diagnosed with a sexually transmitted infection.  If you are at risk for HIV, ask about PrEP medicine to prevent HIV.  Get tested for HIV at least once in your life, whether you are at risk for HIV or not.  Cancer screening tests  Cervical cancer screening: If you have a cervix, begin getting regular cervical cancer screening tests starting at age 21.  Breast cancer scan (mammogram): If you've ever had breasts, begin having regular mammograms starting at age 40. This is a scan to check for breast cancer.  Colon cancer screening: It is important to start screening for colon cancer at age 45.  Have a colonoscopy test every 10 years (or more often if you're at risk) Or, ask your provider about stool tests like a FIT test every year or Cologuard test every 3 years.  To learn more about your testing options, visit:   .  For help making a decision, visit:   https://bit.ly/ev01269.  Prostate cancer screening test: If you have a prostate, ask your care team if a prostate cancer screening test (PSA) at age 55 is right for you.  Lung cancer screening: If you are a current or former smoker ages 50 to 80, ask your care team if ongoing lung cancer screenings are right for you.  For informational purposes only. Not to replace the advice of your health care provider. Copyright   2023 [facility]. All rights reserved. Clinically reviewed by the [facility] Transitions Program. [employer] [id number] - REV 01/24.  Eating Healthy Foods: Care Instructions  With every meal, you can make healthy food choices. Try to eat a variety of fruits, vegetables, whole grains, lean proteins, and low-fat dairy products. This can help  "you get the right balance of nutrients, including vitamins and minerals. Small changes add up over time. You can start by adding one healthy food to your meals each day.    Try to make half your plate fruits and vegetables, one-fourth whole grains, and one-fourth lean proteins. Try including dairy with your meals.   Eat more fruits and vegetables. Try to have them with most meals and snacks.   Foods for healthy eating        Fruits   These can be fresh, frozen, canned, or dried.  Try to choose whole fruit rather than fruit juice.  Eat a variety of colors.        Vegetables   These can be fresh, frozen, canned, or dried.  Beans, peas, and lentils count too.        Whole grains   Choose whole-grain breads, cereals, and noodles.  Try brown rice.        Lean proteins   These can include lean meat, poultry, fish, and eggs.  You can also have tofu, beans, peas, lentils, nuts, and seeds.        Dairy   Try milk, yogurt, and cheese.  Choose low-fat or fat-free when you can.  If you need to, use lactose-free milk or fortified plant-based milk products, such as soy milk.        Water   Drink water when you're thirsty.  Limit sugar-sweetened drinks, including soda, fruit drinks, and sports drinks.  Where can you learn more?  Go to https://www.[employer].net/patiented  Enter T756 in the search box to learn more about \"Eating Healthy Foods: Care Instructions.\"  Current as of: September 20, 2023  Content Version: 14.3    2024 [employer].   Care instructions adapted under license by your healthcare professional. If you have questions about a medical condition or this instruction, always ask your healthcare professional. [employer] disclaims any warranty or liability for your use of this information.    Nutrition for Older Adults: Care Instructions  Overview     Good nutrition is important at any age. But it is especially important for older adults. Eating healthy foods helps keep your body strong. And it " can help lower your risk for disease.  As you get older, your body needs more of certain nutrients. These include vitamin B12, calcium, and vitamin D. But it may be harder for you to get these and other important nutrients. This could be for many reasons. You may not feel as hungry as you used to. Or you could have problems with your teeth or mouth that make it hard to chew. Or you may not enjoy planning and preparing meals, especially if you live alone.  Talk with your doctor if you want help getting the most nutrition from what you eat. They may have you work with a dietitian to help you plan meals.  Follow-up care is a key part of your treatment and safety. Be sure to make and go to all appointments, and call your doctor if you are having problems. It's also a good idea to know your test results and keep a list of the medicines you take.  How can you care for yourself at home?  To stay healthy  Eat a variety of foods. The more you vary the foods you eat, the more vitamins, minerals, and other nutrients you get.  Ask your doctor if you should take a multivitamin. Choose one with about 100% of the daily value (DV) for vitamins and minerals. Do not take more than 100% of the daily value for any vitamin or mineral unless your doctor tells you to. Talk with your doctor if you are not sure which multivitamin is right for you.  Try to eat lots of fruits and vegetables. Fresh or frozen vegetables and fruits are healthy choices. Choose canned vegetables that have no salt added and fruits that are canned in their own juice or light syrup.  Include foods that are high in vitamin B12 in your diet. Good choices are fortified breakfast cereal, nonfat or low-fat milk and other dairy products, meat, poultry, fish, and eggs.  Get enough calcium and vitamin D. Good choices include nonfat or low-fat milk, cheese, and yogurt. Other good options are tofu, orange juice with added calcium, and some leafy green vegetables, such as  osiris greens and kale. If you don't use milk products, talk to your doctor about calcium and vitamin D supplements.  Try to eat protein foods every day. Good choices include lean meat, fish, poultry, eggs, and cheese. Other good options are cooked beans, peanut butter, and nuts and seeds.  Choose whole grains for half of the grains you eat. Look for 100% whole wheat bread, whole-grain cereals, brown rice, and other whole grains.  If you have constipation  Eat high-fiber foods every day if you can. These include fruits, vegetables, cooked dried beans, and whole grains.  Drink plenty of fluids. If you have kidney, heart, or liver disease and have to limit fluids, talk with your doctor before you increase the amount of fluids you drink.  Ask your doctor if stool softeners may help keep your bowels regular.  If you have mouth problems that make chewing hard  Pick canned or cooked fruits and vegetables. These are often softer.  Chop or shred meat, poultry, and fish. Add sauce or gravy to the meat to help keep it moist.  Pick other protein foods that are soft. These include cheese, peanut butter, cooked beans, cottage cheese, and eggs.  If you have trouble shopping for yourself  Ask a local food store to deliver groceries to your home.  Contact your local area agency on aging and ask about resources that can help.  Ask a family member or neighbor to help you.  If you have trouble preparing meals  Try easier cooking methods such as using a slow cooker or microwave oven.  Let the grocery store do some of the work for you. Look for precut, washed, and ready-to-eat foods.  Take part in group meal programs. You can find these through senior citizen programs.  Have meals brought to your home. Your community may offer programs that deliver meals, such as Meals on Wheels. Or you could use an online meal delivery service.  If you are able, take a cooking class.  If your appetite is poor  Try to eat meals on a regular schedule.  "It may help to eat smaller meals more often throughout the day.  If you can, eat some meals with other people. You could ask family or friends to eat with you. Or you could take part in group meal programs offered in your community.  Ask your doctor if your medicines could cause appetite or taste problems. If so, ask about changing medicines.  Add spices and herbs to increase the flavor of food.  If you think you are depressed, ask your doctor for help. Depression can affect your appetite. And it can make it hard to do everyday activities like grocery shopping and making meals. Treatment can help.  When should you call for help?  Watch closely for changes in your health, and be sure to contact your doctor if you have any problems.  Where can you learn more?  Go to https://www.[employer].net/patiented  Enter L643 in the search box to learn more about \"Nutrition for Older Adults: Care Instructions.\"  Current as of: September 25, 2023  Content Version: 14.3    2024 [employer].   Care instructions adapted under license by your healthcare professional. If you have questions about a medical condition or this instruction, always ask your healthcare professional. [employer] disclaims any warranty or liability for your use of this information.    Learning About Stress  What is stress?     Stress is your body's response to a hard situation. Your body can have a physical, emotional, or mental response. Stress is a fact of life for most people, and it affects everyone differently. What causes stress for you may not be stressful for someone else.  A lot of things can cause stress. You may feel stress when you go on a job interview, take a test, or run a race. This kind of short-term stress is normal and even useful. It can help you if you need to work hard or react quickly. For example, stress can help you finish an important job on time.  Long-term stress is caused by ongoing stressful situations or " events. Examples of long-term stress include long-term health problems, ongoing problems at work, or conflicts in your family. Long-term stress can harm your health.  How does stress affect your health?  When you are stressed, your body responds as though you are in danger. It makes hormones that speed up your heart, make you breathe faster, and give you a burst of energy. This is called the fight-or-flight stress response. If the stress is over quickly, your body goes back to normal and no harm is done.  But if stress happens too often or lasts too long, it can have bad effects. Long-term stress can make you more likely to get sick, and it can make symptoms of some diseases worse. If you tense up when you are stressed, you may develop neck, shoulder, or low back pain. Stress is linked to high blood pressure and heart disease.  Stress also harms your emotional health. It can make you butts, tense, or depressed. Your relationships may suffer, and you may not do well at work or school.  What can you do to manage stress?  You can try these things to help manage stress:   Do something active. Exercise or activity can help reduce stress. Walking is a great way to get started. Even everyday activities such as housecleaning or yard work can help.  Try yoga or su chi. These techniques combine exercise and meditation. You may need some training at first to learn them.  Do something you enjoy. For example, listen to music or go to a movie. Practice your hobby or do volunteer work.  Meditate. This can help you relax, because you are not worrying about what happened before or what may happen in the future.  Do guided imagery. Imagine yourself in any setting that helps you feel calm. You can use online videos, books, or a teacher to guide you.  Do breathing exercises. For example:  From a standing position, bend forward from the waist with your knees slightly bent. Let your arms dangle close to the floor.  Breathe in slowly and  "deeply as you return to a standing position. Roll up slowly and lift your head last.  Hold your breath for just a few seconds in the standing position.  Breathe out slowly and bend forward from the waist.  Let your feelings out. Talk, laugh, cry, and express anger when you need to. Talking with supportive friends or family, a counselor, or a umm leader about your feelings is a healthy way to relieve stress. Avoid discussing your feelings with people who make you feel worse.  Write. It may help to write about things that are bothering you. This helps you find out how much stress you feel and what is causing it. When you know this, you can find better ways to cope.  What can you do to prevent stress?  You might try some of these things to help prevent stress:  Manage your time. This helps you find time to do the things you want and need to do.  Get enough sleep. Your body recovers from the stresses of the day while you are sleeping.  Get support. Your family, friends, and community can make a difference in how you experience stress.  Limit your news feed. Avoid or limit time on social media or news that may make you feel stressed.  Do something active. Exercise or activity can help reduce stress. Walking is a great way to get started.  Where can you learn more?  Go to https://www.[employer].net/patiented  Enter N032 in the search box to learn more about \"Learning About Stress.\"  Current as of: October 24, 2023  Content Version: 14.3    2024 [employer]   Care instructions adapted under license by your healthcare professional. If you have questions about a medical condition or this instruction, always ask your healthcare professional. [employer] disclaims any warranty or liability for your use of this information.    Learning About Depression Screening  What is depression screening?  Depression screening is a way to see if you have depression symptoms. It may be done by a doctor or counselor. " "It's often part of a routine checkup. That's because your mental health is just as important as your physical health.  Depression is a mental health condition that affects how you feel, think, and act. You may:  Have less energy.  Lose interest in your daily activities.  Feel sad and grouchy for a long time.  Depression is very common. It affects people of all ages.  Many things can lead to depression. Some people become depressed after they have a stroke or find out they have a major illness like cancer or heart disease. The death of a loved one or a breakup may lead to depression. It can run in families. Most experts believe that a combination of inherited genes and stressful life events can cause it.  What happens during screening?  You may be asked to fill out a form about your depression symptoms. You and the doctor will discuss your answers. The doctor may ask you more questions to learn more about how you think, act, and feel.  What happens after screening?  If you have symptoms of depression, your doctor will talk to you about your options.  Doctors usually treat depression with medicines or counseling. Often, combining the two works best. Many people don't get help because they think that they'll get over the depression on their own. But people with depression may not get better unless they get treatment.  The cause of depression is not well understood. There may be many factors involved. But if you have depression, it's not your fault.  A serious symptom of depression is thinking about death or suicide. If you or someone you care about talks about this or about feeling hopeless, get help right away.  It's important to know that depression can be treated. Medicine, counseling, and self-care may help.  Where can you learn more?  Go to https://www.healthwise.net/patiented  Enter T185 in the search box to learn more about \"Learning About Depression Screening.\"  Current as of: July 31, 2024  Content Version: " 14.3    2024 [employer].   Care instructions adapted under license by your healthcare professional. If you have questions about a medical condition or this instruction, always ask your healthcare professional. [employer] disclaims any warranty or liability for your use of this information.    Chronic Pain: Care Instructions  Your Care Instructions     Chronic pain is pain that lasts a long time (months or even years) and may or may not have a clear cause. It is different from acute pain, which usually does have a clear cause--like an injury or illness--and gets better over time. Chronic pain:  Lasts over time but may vary from day to day.  Does not go away despite efforts to end it.  May disrupt your sleep and lead to fatigue.  May cause depression or anxiety.  May make your muscles tense, causing more pain.  Can disrupt your work, hobbies, home life, and relationships with friends and family.  Chronic pain is a very real condition. It is not just in your head. Treatment can help and usually includes several methods used together, such as medicines, physical therapy, exercise, and other treatments. Learning how to relax and changing negative thought patterns can also help you cope.  Chronic pain is complex. Taking an active role in your treatment will help you better manage your pain. Tell your doctor if you have trouble dealing with your pain. You may have to try several things before you find what works best for you.  Follow-up care is a key part of your treatment and safety. Be sure to make and go to all appointments, and call your doctor if you are having problems. It's also a good idea to know your test results and keep a list of the medicines you take.  How can you care for yourself at home?  Pace yourself. Break up large jobs into smaller tasks. Save harder tasks for days when you have less pain, or go back and forth between hard tasks and easier ones. Take rest breaks.  Relax, and  reduce stress. Relaxation techniques such as deep breathing or meditation can help.  Keep moving. Gentle, daily exercise can help reduce pain over the long run. Try low- or no-impact exercises such as walking, swimming, and stationary biking. Do stretches to stay flexible.  Try heat, cold packs, and massage.  Get enough sleep. Chronic pain can make you tired and drain your energy. Talk with your doctor if you have trouble sleeping because of pain.  Think positive. Your thoughts can affect your pain level. Do things that you enjoy to distract yourself when you have pain instead of focusing on the pain. See a movie, read a book, listen to music, or spend time with a friend.  If you think you are depressed, talk to your doctor about treatment.  Keep a daily pain diary. Record how your moods, thoughts, sleep patterns, activities, and medicine affect your pain. You may find that your pain is worse during or after certain activities or when you are feeling a certain emotion. Having a record of your pain can help you and your doctor find the best ways to treat your pain.  Take pain medicines exactly as directed.  If the doctor gave you a prescription medicine for pain, take it as prescribed.  If you are not taking a prescription pain medicine, ask your doctor if you can take an over-the-counter medicine.  Reducing constipation caused by pain medicine  Talk to your doctor about a laxative. If a laxative doesn't work, your doctor may suggest a prescription medicine.  Include fruits, vegetables, beans, and whole grains in your diet each day. These foods are high in fiber.  If your doctor recommends it, get more exercise. Walking is a good choice. Bit by bit, increase the amount you walk every day. Try for at least 30 minutes on most days of the week.  Schedule time each day for a bowel movement. A daily routine may help. Take your time and do not strain when having a bowel movement.  When should you call for help?   Call your  "doctor now or seek immediate medical care if:    Your pain gets worse or is out of control.     You feel down or blue, or you do not enjoy things like you once did. You may be depressed, which is common in people with chronic pain. Depression can be treated.     You have vomiting or cramps for more than 2 hours.   Watch closely for changes in your health, and be sure to contact your doctor if:    You cannot sleep because of pain.     You are very worried or anxious about your pain.     You have trouble taking your pain medicine.     You have any concerns about your pain medicine.     You have trouble with bowel movements, such as:  No bowel movement in 3 days.  Blood in the anal area, in your stool, or on the toilet paper.  Diarrhea for more than 24 hours.   Where can you learn more?  Go to https://www.[employer].net/patiented  Enter N004 in the search box to learn more about \"Chronic Pain: Care Instructions.\"  Current as of: July 31, 2024  Content Version: 14.3    2024 [employer]   Care instructions adapted under license by your healthcare professional. If you have questions about a medical condition or this instruction, always ask your healthcare professional. [employer] disclaims any warranty or liability for your use of this information.       "

## 2025-02-09 LAB
DEPRECATED CALCIDIOL+CALCIFEROL SERPL-MC: <35 UG/L (ref 20–75)
VITAMIN D2 SERPL-MCNC: <5 UG/L
VITAMIN D3 SERPL-MCNC: 30 UG/L

## 2025-02-17 ENCOUNTER — VIRTUAL VISIT (OUTPATIENT)
Facility: CLINIC | Age: 70
End: 2025-02-17
Payer: COMMERCIAL

## 2025-02-17 DIAGNOSIS — F41.1 GENERALIZED ANXIETY DISORDER: Primary | ICD-10-CM

## 2025-02-17 PROCEDURE — 90837 PSYTX W PT 60 MINUTES: CPT | Mod: 95

## 2025-02-17 NOTE — PROGRESS NOTES
[facility]                                     Progress Note    Patient Name: Jolynn Haji  Date: 2/17/2025         Service Type: Individual      Session Start Time: 4:00 pm  Session End Time: 4:53 pm     Session Length: 53 min    Session #: 4 (previous therapy with Erika RAIN)    Attendees: Client attended alone    Service Modality:  Video Visit:      Provider verified identity through the following two step process.  Patient provided:  Patient address    Telemedicine Visit: The patient's condition can be safely assessed and treated via synchronous audio and visual telemedicine encounter.      Reason for Telemedicine Visit: Patient convenience (e.g. access to timely appointments / distance to available provider)    Originating Site (Patient Location): Patient's home    Distant Site (Provider Location): Provider Remote Setting- Home Office    Consent:  The patient/guardian has verbally consented to: the potential risks and benefits of telemedicine (video visit) versus in person care; bill my insurance or make self-payment for services provided; and responsibility for payment of non-covered services.     Patient would like the video invitation sent by:  My Chart    Mode of Communication:  Video Conference via [facility]    Distant Location (Provider):  Off-site    As the provider I attest to compliance with applicable laws and regulations related to telemedicine.    DATA  Interactive Complexity: No  Crisis: No  Extended Session (53+ minutes): PROLONGED SERVICE IN THE OUTPATIENT SETTING REQUIRING DIRECT (FACE-TO-FACE) PATIENT CONTACT BEYOND THE USUAL SERVICE:    - Patient's presenting concerns require more intensive intervention than could be completed within the usual service  Interactive Complexity: No  Crisis: No      Progress Since Last Session (Related to Symptoms / Goals / Homework):   Symptoms: No change anxiety    Homework: Achieved / completed to satisfaction      Episode of Care Goals: Minimal  progress - CONTEMPLATION (Considering change and yet undecided); Intervened by assessing the negative and positive thinking (ambivalence) about behavior change     Current / Ongoing Stressors and Concerns:   Current functioning, cognitive questions, overwhelm. Final transition off Venlafaxine. Medical complexity related to current medication     Treatment Objective(s) Addressed in This Session:   Sleep hygiene  Grounding skills, mindfulness  Gratitude practice  Anxiety triggers     Intervention:   Interpersonal Therapy: provided active listening and validation of experience with stressors. Utilized rapport building, reinforced grounding skills, sleep hygiene strategies,GLAD gratitude practice. Surfaced situations triggering anxiety   Leaves on a Stream mindfulness practice    Assessments completed prior to visit:  The following assessments were completed by patient for this visit:  PHQ2:       5/3/2023     1:18 PM 3/21/2023    11:20 AM 2/23/2023     7:15 PM 1/31/2023     3:21 PM 1/12/2023     4:28 PM 12/15/2022     9:12 PM 12/8/2022     9:38 AM   PHQ-2 ( 1999 [employer])   Q1: Little interest or pleasure in doing things   0 0 0  0   Q2: Feeling down, depressed or hopeless   0 0 0  0   PHQ-2 Score   0 0 0  0   Q1: Little interest or pleasure in doing things   Not at all Not at all Not at all  Not at all   Q2: Feeling down, depressed or hopeless   Not at all Not at all Not at all  Not at all   PHQ-2 Score Incomplete Incomplete 0 0 0 Incomplete 0     PHQ9:       12/2/2024     5:16 PM 12/10/2024     5:27 PM 12/12/2024     1:09 PM 12/17/2024     2:23 PM 1/1/2025    11:51 AM 1/8/2025    11:26 AM 2/5/2025    11:26 AM   PHQ-9 SCORE   PHQ-9 Total Score MyChart 2 (Minimal depression) 2 (Minimal depression)  10 (Moderate depression) 8 (Mild depression) 11 (Moderate depression) 7 (Mild depression)   PHQ-9 Total Score 2  2  12 10  8  11  7        Patient-reported     GAD2:       9/8/2024     6:03 PM 9/23/2024     3:00 PM 10/8/2024      4:08 PM 11/18/2024     9:17 AM 12/2/2024     5:19 PM 12/28/2024     3:34 PM 1/28/2025     3:51 PM   TIFFANY-2   Feeling nervous, anxious, or on edge 1 1 1 1 2 3 3   Not being able to stop or control worrying 0 0 0 0 1 2 1   TIFFANY-2 Total Score 1 1    1 1 1  3  5  4        Patient-reported     GAD7:       10/14/2024    11:11 AM 12/2/2024     5:19 PM 12/12/2024     1:09 PM 12/17/2024     2:25 PM 1/6/2025     8:49 AM 1/8/2025    11:28 AM 1/28/2025     3:51 PM   TIFFANY-7 SCORE   Total Score 3 (minimal anxiety) 2 (minimal anxiety)  8 (mild anxiety) 9 (mild anxiety) 6 (mild anxiety) 5 (mild anxiety)   Total Score 3 2  5 8  9  6  5        Patient-reported     CAGE-AID:       6/6/2017    12:05 PM 5/12/2022     2:26 PM   CAGE-AID Total Score   Total Score  0    0   Total Score MyChart  0 (A total score of 2 or greater is considered clinically significant)       Information is confidential and restricted. Go to Review Flowsheets to unlock data.     PROMIS 10-Global Health (only subscores and total score):       2/25/2024     2:04 PM 5/27/2024     7:01 PM 6/9/2024     8:48 PM 6/23/2024     8:01 PM 9/23/2024     9:33 AM 10/8/2024     4:11 PM 12/28/2024     3:39 PM   PROMIS-10 Scores Only   Global Mental Health Score 17 17 17 17    17 17    17    17 18 12    Global Physical Health Score 17 19 19 19    19 18    18    18 17 12    PROMIS TOTAL - SUBSCORES 34 36 36 36    36 35    35    35 35 24        Patient-reported     Lamb Suicide Severity Rating Scale (Lifetime/Recent)      5/12/2022    10:07 PM 9/27/2022     2:16 PM 2/28/2024    10:11 AM   Lamb Suicide Severity Rating (Lifetime/Recent)   1. Wish to be Dead (Lifetime) N Y Y   Wish to be Dead Description (Lifetime)  fleeting thought of wishing she were dead    1. Wish to be Dead (Past 1 Month)  N N   2. Non-Specific Active Suicidal Thoughts (Lifetime) N N N   Most Severe Ideation Rating (Lifetime)  1 1   Most Severe Ideation Rating (Past 1 Month)  1 1   Frequency (Lifetime)   1 1   Frequency (Past 1 Month)  1    Duration (Lifetime)  1 1   Duration (Past 1 Month)  1    Controllability (Lifetime)  1    Controllability (Past 1 Month)  1    Deterrents (Lifetime)  1    Deterrents (Past 1 Month)  1    Reasons for Ideation (Lifetime)  4 5   Reasons for Ideation (Past 1 Month)  4 0   Actual Attempt (Lifetime) N N N   Has subject engaged in non-suicidal self-injurious behavior? (Lifetime) N N N   Interrupted Attempts (Lifetime) N N N   Aborted or Self-Interrupted Attempt (Lifetime) N N N   Preparatory Acts or Behavior (Lifetime) N N N   Calculated C-SSRS Risk Score (Lifetime/Recent) No Risk Indicated No Risk Indicated No Risk Indicated         ASSESSMENT: Current Emotional / Mental Status (status of significant symptoms):   Risk status (Self / Other harm or suicidal ideation)   Patient denies current fears or concerns for personal safety.   Patient denies current or recent suicidal ideation or behaviors.   Patient denies current or recent homicidal ideation or behaviors.   Patient denies current or recent self injurious behavior or ideation.   Patient denies other safety concerns.   Patient reports there has been no change in risk factors since their last session.     Patient reports there has been no change in protective factors since their last session.     Recommended that patient call 911 or go to the local ED should there be a change in any of these risk factors     Appearance:   Appropriate    Eye Contact:   Fair    Psychomotor Behavior: Normal  Restless    Attitude:   Cooperative    Orientation:   All   Speech    Rate / Production: Hyperverbal  Normal     Volume:  Normal    Mood:    Anxious  Normal   Affect:    Appropriate    Thought Content:  Clear    Thought Form:  Coherent  Logical  Circumstantial   Insight:    Fair      Medication Review:   No changes to current psychiatric medication(s)     Medication Compliance:   Yes     Changes in Health Issues:   None reported     Chemical Use  Review:   Substance Use: Chemical use reviewed, no active concerns identified      Tobacco Use: No current tobacco use.      Diagnosis:  1. Generalized anxiety disorder      Collateral Reports Completed:   Not Applicable    PLAN: (Patient Tasks / Therapist Tasks / Other)  Attend to mindfulness grounding practices, GLAD gratitude, sleep hygiene, notice anxiety triggers, leaves on a stream.         Mica Tang, LICSW  2/17/25                                                         ______________________________________________________________________

## 2025-02-18 ASSESSMENT — ANXIETY QUESTIONNAIRES
8. IF YOU CHECKED OFF ANY PROBLEMS, HOW DIFFICULT HAVE THESE MADE IT FOR YOU TO DO YOUR WORK, TAKE CARE OF THINGS AT HOME, OR GET ALONG WITH OTHER PEOPLE?: SOMEWHAT DIFFICULT
GAD7 TOTAL SCORE: 7
GAD7 TOTAL SCORE: 7
IF YOU CHECKED OFF ANY PROBLEMS ON THIS QUESTIONNAIRE, HOW DIFFICULT HAVE THESE PROBLEMS MADE IT FOR YOU TO DO YOUR WORK, TAKE CARE OF THINGS AT HOME, OR GET ALONG WITH OTHER PEOPLE: SOMEWHAT DIFFICULT
2. NOT BEING ABLE TO STOP OR CONTROL WORRYING: MORE THAN HALF THE DAYS
7. FEELING AFRAID AS IF SOMETHING AWFUL MIGHT HAPPEN: NOT AT ALL
3. WORRYING TOO MUCH ABOUT DIFFERENT THINGS: SEVERAL DAYS
4. TROUBLE RELAXING: SEVERAL DAYS
6. BECOMING EASILY ANNOYED OR IRRITABLE: NOT AT ALL
1. FEELING NERVOUS, ANXIOUS, OR ON EDGE: MORE THAN HALF THE DAYS
5. BEING SO RESTLESS THAT IT IS HARD TO SIT STILL: SEVERAL DAYS

## 2025-02-21 ENCOUNTER — VIRTUAL VISIT (OUTPATIENT)
Dept: PSYCHIATRY | Facility: CLINIC | Age: 70
End: 2025-02-21
Payer: COMMERCIAL

## 2025-02-21 DIAGNOSIS — F41.1 GAD (GENERALIZED ANXIETY DISORDER): Primary | ICD-10-CM

## 2025-02-21 DIAGNOSIS — G47.00 INSOMNIA, UNSPECIFIED TYPE: ICD-10-CM

## 2025-02-21 DIAGNOSIS — F39 MOOD DISORDER: ICD-10-CM

## 2025-02-21 DIAGNOSIS — Z87.820 HISTORY OF TRAUMATIC BRAIN INJURY: ICD-10-CM

## 2025-02-21 PROCEDURE — 98006 SYNCH AUDIO-VIDEO EST MOD 30: CPT | Performed by: PSYCHIATRY & NEUROLOGY

## 2025-02-21 PROCEDURE — G2211 COMPLEX E/M VISIT ADD ON: HCPCS | Performed by: PSYCHIATRY & NEUROLOGY

## 2025-02-21 NOTE — PROGRESS NOTES
"Virtual Visit Details    Type of service:  Video Visit   Video Start Time: {video visit start/end time for provider to select:347637}  Video End Time:{video visit start/end time for provider to select:989927}    Originating Location (pt. Location): {video visit patient location:522951::\"Home\"}  {PROVIDER LOCATION On-site should be selected for visits conducted from your clinic location or adjoining [facility] hospital, academic office, or other nearby [facility] building. Off-site should be selected for all other provider locations, including home:236675}  Distant Location (provider location):  {virtual location provider:490588}  Platform used for Video Visit: {Virtual Visit Platforms:304085::\"[employer]rol[employer]hers\"}  "

## 2025-02-21 NOTE — PROGRESS NOTES
"Telemedicine Visit: The patient's condition can be safely assessed and treated via synchronous audio and visual telemedicine encounter.      Reason for Telemedicine Visit: Patient has requested telehealth visit    Originating Site (Patient Location): Patient's home    Distant Location (provider location): On-Site    Consent:  The patient/guardian has verbally consented to: the potential risks and benefits of telemedicine (video visit) versus in person care; bill my insurance or make self-payment for services provided; and responsibility for payment of non-covered services.     Mode of Communication:  Video Conference via [employer]    As the provider I attest to compliance with applicable laws and regulations related to telemedicine.        Outpatient Psychiatric Progress Note    Name: Jolynn Haji   : 1955                    Primary Care Provider: Helga Ibarra MD   Therapist: DINESH Campbell    PHQ-9 scores:      2025    11:51 AM 2025    11:26 AM 2025    11:26 AM   PHQ-9 SCORE   PHQ-9 Total Score MyChart 8 (Mild depression) 11 (Moderate depression) 7 (Mild depression)   PHQ-9 Total Score 8  11  7        Patient-reported       TIFFANY-7 scores:      2025    11:28 AM 2025     3:51 PM 2025     3:52 PM   TIFFANY-7 SCORE   Total Score 6 (mild anxiety) 5 (mild anxiety) 7 (mild anxiety)   Total Score 6  5  7        Patient-reported       Patient Identification:  Patient is a 69 year old,   White Choose not to answer female  who presents for return visit with me.  Patient is currently retired. Patient attended the phone/video session with daughter Malini. Patient prefers to be called: \" Azalea\".    Interim History:  I last saw Jolynn Haji for outpatient psychiatry return visit on 2025 . During that appointment, we:  Continue trazodone 12.5-50 mg at bedtime as needed for sleep  Continue propranolol EXTENDED RELEASE 60 mg ONCE daily for anxiety, tremor, headaches.  Make " "the following medication changes:  Days 1-5  Decrease venlafaxine/Effexor-XR to 75 mg daily  Start sertraline/Zoloft 25 mg daily  Days 6-15  Decrease venlafaxine/Effexor-XR to 37.5 mg daily  Increase sertraline to 50 mg daily  Days 16-25  Decrease venlafaxine/Effexor-XR to 37.5 mg once EVERY OTHER day  Increase sertraline/Zoloft to 75 mg daily  Days 26 and on  STOP venlafaxine/Effexor-XR   Increase sertraline/Zoloft to 100 mg daily as tolerated (or ok to stay at 75 mg dose)  Schedule follow-up with you neurologist, Dr. Amato. Can call [phone] to get scheduled.   Schedule and complete CT scan of head/brain. Someone from CT/imaging department should call to get you scheduled for a CT scan of your brain.     2/21: Patient has now been off venlafaxine for three weeks and on sertraline 100 mg daily.  Headaches much improved and occurring only every few days.  Much more tolerable headaches as well.  Has had a handful of loose stools.  Continuing to monitor.  Does notice some possible improvement since being on sertraline.  The first few days starting the medication seemed to be the most significant.  Patient does report having more good days on sertraline than what she felt like she was having on the venlafaxine.  Afternoons tend to be the most difficult time of the day.  Feels \"glazed over.\"  Not feel that way in the morning tend to be the best when she feels like she can really relax and kick back.  Still not driving due to anxiety.  No acute safety concerns.  No SI.  No problematic drug or alcohol use.  Still taking trazodone at bedtime.  Continues on propranolol.    Per [facility], [facility], during today's team-based visit:  No [facility] available today     Past Psychiatric Med Trials:  Psych Meds at Intake:  lexapro 20 mg daily  abilify 5 mg daily      Past Psych Meds:  Amitriptyline  Quetiapine  Mirtazapine - stopped \"because I was sleeping through the night;\" hallucinations     Psychiatric ROS:  Jolynn Haji " reports mood has been: See HPI above  Anxiety has been: See HPI above  Sleep has been: Sleeping ok with trazodone  Seema sxs: None  Psychosis sxs: N/A  ADHD/ADD sxs: N/A  PTSD sxs: N/A  PHQ9 and GAD7 scores were reviewed today if completed.   Medication side effects: See HPI above  Current stressors include: Symptoms and See HPI above  Coping mechanisms and supports include: Family, Hobbies and Friends, therapy    Current medications include:   Current Outpatient Medications   Medication Sig Dispense Refill    clindamycin (CLEOCIN T) 1 % external lotion Apply topically 2 times daily. For acne flares 60 mL 3    lactobacillus rhamnosus, GG, (CULTURELL) capsule Take 1 capsule by mouth daily Taking every other day      lisinopril (ZESTRIL) 2.5 MG tablet Take 1 tablet (2.5 mg) by mouth daily as needed (BP >135/85). 90 tablet 3    propranolol ER (INDERAL LA) 60 MG 24 hr capsule TAKE ONE CAPSULE BY MOUTH ONCE DAILY 90 capsule 3    sertraline (ZOLOFT) 100 MG tablet Take 1 tablet (100 mg) by mouth daily. 90 tablet 0    traZODone (DESYREL) 50 MG tablet Take 0.5-1 tablets (25-50 mg) by mouth nightly as needed for sleep. 90 tablet 1     Current Facility-Administered Medications   Medication Dose Route Frequency Provider Last Rate Last Admin    lidocaine 1 % 4 mL  4 mL INTRA-ARTICULAR Once Helga Ibarra MD           Past Medical/Surgical History:  Past Medical History:   Diagnosis Date    Abnormal glandular Papanicolaou smear of cervix- ASCUS in 2000 12/5/2003    ASC H- 2000    Basal cell carcinoma of leg, right 05/2016    Dr Rogers    Colon polyps 05/2018    tubular adenoam - due 5 yrs - 2 mm    Concussion without loss of consciousness 4/12/2017    Hidradenitis     left  groin    HSIL (high grade squamous intraepithelial lesion) on Pap smear of cervix 7/5/2000    Normal paps from 2001 to 2016/cc    Hypertension goal BP (blood pressure) < 140/90     OA (osteoarthritis) of knee     moderate    Postmenopausal since  7/2008     Shingles 7/23/2010    left     Squamous cell carcinoma of skin, unspecified     Synovial cyst of popliteal space       has a past medical history of Abnormal glandular Papanicolaou smear of cervix- ASCUS in 2000 (12/5/2003), Basal cell carcinoma of leg, right (05/2016), Colon polyps (05/2018), Concussion without loss of consciousness (4/12/2017), Hidradenitis, HSIL (high grade squamous intraepithelial lesion) on Pap smear of cervix (7/5/2000), Hypertension goal BP (blood pressure) < 140/90, OA (osteoarthritis) of knee, Postmenopausal (since 7/2008 ), Shingles (7/23/2010), Squamous cell carcinoma of skin, unspecified, and Synovial cyst of popliteal space.    She has no past medical history of Malignant melanoma (H) or Squamous cell carcinoma.    Social History:  Reviewed. No changes to social history except as noted above in HPI.    Vital Signs:   None. This is phone/video visit.     Labs:  Most recent laboratory results reviewed and the pertinent results include:   TSH   Date Value Ref Range Status   02/06/2025 1.49 0.30 - 4.20 uIU/mL Final   04/25/2022 1.30 0.40 - 4.00 mU/L Final   01/13/2021 2.07 0.40 - 4.00 mU/L Final     Lab Results   Component Value Date    WBC 8.1 04/25/2022    WBC 6.4 01/13/2021     Lab Results   Component Value Date    RBC 4.58 04/25/2022    RBC 4.54 01/13/2021     Lab Results   Component Value Date    HGB 13.6 04/25/2022    HGB 13.3 01/13/2021     Lab Results   Component Value Date    HCT 41.1 04/25/2022    HCT 41.7 01/13/2021     No components found for: MCT  Lab Results   Component Value Date    MCV 90 04/25/2022    MCV 92 01/13/2021     Lab Results   Component Value Date    MCH 29.7 04/25/2022    MCH 29.3 01/13/2021     Lab Results   Component Value Date    MCHC 33.1 04/25/2022    MCHC 31.9 01/13/2021     Lab Results   Component Value Date    RDW 12.9 04/25/2022    RDW 13.1 01/13/2021     Lab Results   Component Value Date     04/25/2022     01/13/2021     Last  Comprehensive Metabolic Panel:  Sodium   Date Value Ref Range Status   01/09/2025 138 135 - 145 mmol/L Final   01/13/2021 140 133 - 144 mmol/L Final     Potassium   Date Value Ref Range Status   01/09/2025 4.4 3.4 - 5.3 mmol/L Final   04/25/2022 4.4 3.4 - 5.3 mmol/L Final   01/13/2021 4.1 3.4 - 5.3 mmol/L Final     Chloride   Date Value Ref Range Status   01/09/2025 101 98 - 107 mmol/L Final   04/25/2022 105 94 - 109 mmol/L Final   01/13/2021 108 94 - 109 mmol/L Final     Carbon Dioxide   Date Value Ref Range Status   01/13/2021 28 20 - 32 mmol/L Final     Carbon Dioxide (CO2)   Date Value Ref Range Status   01/09/2025 26 22 - 29 mmol/L Final   04/25/2022 26 20 - 32 mmol/L Final     Anion Gap   Date Value Ref Range Status   01/09/2025 11 7 - 15 mmol/L Final   04/25/2022 6 3 - 14 mmol/L Final   01/13/2021 4 3 - 14 mmol/L Final     Glucose   Date Value Ref Range Status   01/09/2025 99 70 - 99 mg/dL Final   04/25/2022 104 (H) 70 - 99 mg/dL Final   01/13/2021 91 70 - 99 mg/dL Final     Comment:     Fasting specimen     Urea Nitrogen   Date Value Ref Range Status   01/09/2025 22.3 8.0 - 23.0 mg/dL Final   04/25/2022 23 7 - 30 mg/dL Final   01/13/2021 18 7 - 30 mg/dL Final     Creatinine   Date Value Ref Range Status   01/09/2025 0.72 0.51 - 0.95 mg/dL Final   01/13/2021 0.68 0.52 - 1.04 mg/dL Final     GFR Estimate   Date Value Ref Range Status   01/09/2025 89 >60 mL/min/1.73m2 Final     Comment:     eGFR calculated using 2021 CKD-EPI equation.   01/13/2021 >90 >60 mL/min/[1.73_m2] Final     Comment:     Non  GFR Calc  Starting 12/18/2018, serum creatinine based estimated GFR (eGFR) will be   calculated using the Chronic Kidney Disease Epidemiology Collaboration   (CKD-EPI) equation.       Calcium   Date Value Ref Range Status   01/09/2025 9.7 8.8 - 10.4 mg/dL Final     Comment:     Reference intervals for this test were updated on 7/16/2024 to reflect our healthy population more accurately. There may be  differences in the flagging of prior results with similar values performed with this method. Those prior results can be interpreted in the context of the updated reference intervals.   01/13/2021 8.8 8.5 - 10.1 mg/dL Final     Bilirubin Total   Date Value Ref Range Status   02/06/2025 0.5 <=1.2 mg/dL Final   01/13/2021 0.8 0.2 - 1.3 mg/dL Final     Alkaline Phosphatase   Date Value Ref Range Status   02/06/2025 77 40 - 150 U/L Final   01/13/2021 72 40 - 150 U/L Final     ALT   Date Value Ref Range Status   02/06/2025 21 0 - 50 U/L Final   01/13/2021 17 0 - 50 U/L Final     AST   Date Value Ref Range Status   02/06/2025 22 0 - 45 U/L Final   01/13/2021 13 0 - 45 U/L Final     Review of Systems:  10 systems (general, cardiovascular, respiratory, eyes, ENT, endocrine, GI, , M/S, neurological) were reviewed. Most pertinent finding(s) is/are: Some chronic pains, intermittent headaches, see HPI above. The remaining systems are all unremarkable.    Mental Status Examination (limited as this is by phone/video):  Appearance: Awake, alert, appears stated age, no acute distress, well-groomed   Attitude:  cooperative, pleasant   Motor: No gross abnormalities noted today.  Not formally tested  Oriented to:  person, place, time, and situation  Attention Span and Concentration: Attention seems intact, concentration struggles noted but not formally tested  Speech: Normal volume, some hesitation noted, possible word-finding difficulties?  Language: possible word-finding difficulties?  Mood: Still quite anxious  Affect: Mood congruent  Associations:  no loose associations  Thought Process: Overall linear and logical  Thought Content:  no evidence of suicidal ideation or homicidal ideation, no auditory hallucinations present and no visual hallucinations present  Recent and Remote Memory: Seems to be struggling more with some short-term memory -not formally tested  Fund of Knowledge: appropriate  Insight: Fair-good  Judgment:   intact, adequate for safety  Impulse Control:  intact    Suicide Risk Assessment:  Today Jolynn Haji reports no suicidal ideation. Based on all available evidence including the factors cited above, Jolynn Haji does not appear to be at imminent risk for self-harm, does not meet criteria for a 72-hr hold, and therefore remains appropriate for ongoing outpatient level of care.  A thorough assessment of risk factors related to suicide and self-harm have been reviewed and are noted above. The patient convincingly denies suicidality on several occasions. Local community safety resources reviewed for patient to use if needed. There was no deceit detected, and the patient presented in a manner that was believable.     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  R/O Underlying Parkinson's Disease (pt has seen neurology - continuing to monitor)    Medical comorbidities include:   Patient Active Problem List    Diagnosis Date Noted    Essential hypertension with goal blood pressure less than 140/90 - then Orthostatic hypotension- resolved off of lisinopril 2.5mg daily and doing daily 2.25 mile walks with her dog 07/16/2001     Priority: High    Moderate recurrent major depression (H) 12/19/2024     Priority: Medium    Orthostatic hypotension- resolved off of lisinopril 2.5mg daily and doing daily 2.25 mile walks with her dog 10/14/2024     Priority: Medium    Major depressive disorder, recurrent, mild 12/06/2023     Priority: Medium    Bilateral lower extremity edema- trace pitting edema distal LE to mid pretibial areas Left > right 09/27/2023     Priority: Medium    Tremor 09/27/2023     Priority: Medium    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 - lower pretibial area- has follow up with Dermatology 3/4/2025 07/12/2023     Priority: Medium     Drug-induced parkinsonism (H)- Abilify - generic = ARIPiprazole( 5mg) - RESOLVED OFF ABILIFY  01/02/2023     Priority: Medium    Mood disorder 01/02/2023     Priority: Medium    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 - much improved - resolved as of 5/5/2023 07/07/2022     Priority: Medium    Adjustment insomnia - resolved as of 5/5/2023 - off of ambien with psychiatry's okay  12/23/2021     Priority: Medium    Screening for cervical cancer      Priority: Medium     1999 NIL  2000 ASCUS-H.  >> Colpo: Neg  2001 NIL   2003 NIL pap, Neg HPV  [id number], 2016  NIL pap  7/25/19 NIL pap, Neg HPV    Criteria necessary to stop screening per ASCCP guidelines:  Older than 65 years  Three consecutive negative cytology results or two consecutive negative cotest results within the previous 10 years, with the most recent being performed within the last 5 years.   (Women with hx of MADDIE 2 or 3, or adenocarcinoma in situ should continue screening for full 20 years, even if this goes past age 65).        Hyperlipidemia LDL goal <130 11/30/2018     Priority: Medium    Basal cell carcinoma      Priority: Medium    Colon polyps 05/01/2018     Priority: Medium    Primary localized osteoarthrosis of left lower leg 02/19/2018     Priority: Medium    Other secondary osteoarthritis of left knee 01/29/2018     Priority: Medium    Chronic constipation 09/14/2017     Priority: Medium    Raynaud's phenomenon without gangrene 09/14/2017     Priority: Medium    Insomnia, unspecified type 07/25/2017     Priority: Medium    TIFFANY (generalized anxiety disorder) 05/23/2017     Priority: Medium    Adjustment disorder with mixed anxiety and depressed mood- mild  at this time 05/11/2017     Priority: Medium    Family history of celiac disease 05/11/2017     Priority: Medium    Basal cell carcinoma of leg, right 05/01/2016     Priority: Medium    Sun-damaged skin 04/21/2016     Priority: Medium     Seasonal allergic rhinitis 04/21/2016     Priority: Medium    Dupuytren's disease of palm - right 4th digit flexor tendon 06/11/2015     Priority: Medium    History of migraine headaches- assoc. with nausea/vomiting - resolved around menopause - were  premenstrual  01/08/2014     Priority: Medium    Rosacea 05/01/2013     Priority: Medium    Numbness of feet- distal feet R>L  02/21/2011     Priority: Medium    NUMBNESS AND TINGLING OF FOOT - bilateral -mild  08/25/2010     Priority: Medium    Osteopenia 01/02/2009     Priority: Medium    Lipidosis 10/24/2006     Priority: Medium     Problem list name updated by automated process. Provider to review      Localized osteoarthritis of hand 11/01/2005     Priority: Medium     Problem list name updated by automated process. Provider to review      Stress incontinence, female - mild 10/23/2012     Priority: Low    Cough 10/23/2012     Priority: Low    Acute bronchitis- recurrent- ? related to mold in school building- pt has since retired and no further bronchitis 10/23/2012     Priority: Low    Postmenopausal      Priority: Low    DERMATOPHYTOSIS OF NAIL- toenails  03/14/2006     Priority: Low     trichophyton rubrum on culture - 1/06       Synovial cyst of popliteal space      Priority: Low    Hidradenitis 12/05/2003     Priority: Low       Psychosocial & Contextual Factors: see HPI above    Assessment:  From Intake, 5/12/2022:  Jolynn Haji is a 66-year-old female with a history including anxiety, depression, insomnia status post concussion in April 2017.  No major mental health struggles prior to concussion in 2017.  Patient was started on Lexapro, quetiapine, mirtazapine to help with her symptoms.  Patient had been feeling much better on that medication combination and once she was sleeping well again quetiapine and mirtazapine were discontinued.  This past January the patient started to have some worsening symptoms and Abilify was added.  Patient has felt like her  current medications of Lexapro and Abilify have been helpful but reports starting to feel really slowed and numb the past several weeks.  She also reported a weight loss of nearly 20 pounds over the last 6 weeks versus several months (patient was not very sure). I am wondering if she could be experiencing side effects of her increased Lexapro dose.  She is agreeable to decreasing her dose slightly to 15 mg daily.  Could also consider decreasing Abilify in the case it is not her Lexapro.  She also desires to sleep a little bit better than she has been.  Could consider restarting mirtazapine at bedtime for sleep and to also augment her Lexapro.  Could also be beneficial for patient to undergo cognitive screening to check in and see what her baseline is status post concussion.  No acute safety concerns.  No SI.  No problematic drug or alcohol use.    5/24/2022:  Patient with some slight improvements of possible medication related side effects after decreasing Lexapro.  I am wondering if there might be more robust improvements with decreasing Abilify/aripiprazole.  Possible drug-induced Parkinsonism?  She does seem to have delayed/slowed speech, masked facies.  Her gait and other movements unable to be observed but she does report other struggles that may be consistent with such a presentation.  We are going to decrease her Abilify down to 2 mg daily and I will see her back in just a few weeks.  We will likely discontinue her Abilify at her next visit.  I will message her primary care provider to get some collateral regarding previous baseline functioning prior to starting Abilify.  History of TBI could be confounding presentation.  Could consider neurology consult.    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  Instructed to stop Abilify today.  We will monitor for improvement of symptoms.  If symptoms do not improve may need to refer to neurology.  If symptoms start to improve we will  consider low-dose methylphenidate augmentation of Lexapro next visit to help with low energy, mood, focus and concentration, cognition, s/p TBI if still clinically indicated.  We would discuss risks and benefits with patient and her daughter at that time.  No other acute safety concerns or SI.  No problematic drug or alcohol use.  If patient starts to struggle with sleep would restart mirtazapine at bedtime.    7/28/2022:  Pt still with sxs consistent with drug-induced Parksinsonism.  Neurology consult will be placed so that patient might get scheduled sooner than later due to the typically long wait.  We will start Ambien in hopes we can get her sleeping much better.  Discussed risks and benefits of its use, particularly in the geriatric population.  Lexapro has not been as helpful as it helped for her anxiety.  We will taper this medication and start Effexor/venlafaxine in hopes that might be more effective for her symptoms.  No acute safety concerns.  No SI.  No problematic drug or alcohol use.    8/11/2022:  Overall with continued improved symptoms.  Hopefully patient will continue to have improvement of her symptoms off of Abilify.  Patient will be seeing neurology soon.  Encouraged to keep appointment.  We will continue taper off of Lexapro and continue on monotherapy with venlafaxine.  We will also continue Ambien at this time for sleep since patient is sleeping much better on the medication with improved symptoms and tolerating well.  No acute safety concerns.  No problematic drug or alcohol use.    9/1/2022:  Ongoing anxiety, still intense at times.  Still not sleeping very well.  Could consider sleep evaluation, but patient quite overwhelmed right now with various referrals and doctors appointments.  Briseyda had been working quite well but not keeping her sleep now.  Still falling asleep relatively well.  We will transition to Ambien CR to see if that is more helpful.  We will continue to consider sleep  evaluation to rule out possibility of REM behavior sleep disorder.  We will also transition venlafaxine to bedtime to see if that helps with any possible nausea.  Working with neurology to monitor for Parkinson's disease.  I do recommend neuropsychological testing due to ongoing possible cognitive difficulties/struggles.  No acute safety concerns.  No SI.  No problematic drug or alcohol use.    10/3/2022:  Patient overall with some ongoing anxiety but slowly improving since it got worse late August.  Recommend continuing to optimize Effexor-XR, especially since patient tolerating well.  No longer having any nausea now that dose is taken at bedtime.  Patient feeling a little foggy and off balance with Ambien CR and so we will switch back to Ambien immediate release.  No falls.  Recommend increasing Effexor-XR first and once well tolerated switching back to Ambien immediate release.  May need to continue to work on alternative options for sleep if does not do well back on Ambien immediate release.  No acute safety concerns.  No SI.  No problematic drug or alcohol use.  Encouraged to continue working with neurology.  Next appointment in November.    10/24/2022:  Patient overall with some improved anxiety on increased dose of Effexor-XR.  Could use additional improvement of anxiety and so we will continue to optimize therapy with Effexor-XR.  We will increase dose to 150 mg daily.  Tolerating well with no notable negative side effects.  Patient transitioned back to Ambien CR and is sleeping better on this compared to Ambien immediate release.  We will continue Ambien CR for sleep.  Discussed possibility of sleep medicine referral but patient declines at this time.  Could consider in the future.  No acute safety concerns.  No SI.  No problematic drug or alcohol use.  Encouraged to continue working with neurology.    12/09/2022:  Patient overall doing fairly well.  Feels like increased dose of venlafaxine has been  helpful.  I do wonder if she is having some subtle withdrawal symptoms late in the afternoon with headache and fatigue.  Discussed shifting her dose to the morning.  However, she reports she will try to drink more water and see if that is helpful before trying to make changes to her medication.  She feels like things are going well enough that she really does not want to change anything.  Encouraged to continue in therapy.  No acute safety concerns.  No SI.  No problematic drug or alcohol use.    1/19/2023:  Patient overall doing well with ongoing improvements.  Patient even recently starting to drive again which is a big deal.  She continues in therapy and finds it helpful.  Discussed possible changes to her dosing to see if headaches might improve, along with fatigue.  Discussed possibly splitting her Effexor-XR dose as 75 mg twice daily versus 37 point 5 in the morning and 112.5 mg at bedtime.  Patient feels like she is in a good place right now and really does not want to make any medication changes if possible.  We will keep things the same for now.  Encouraged to drink lots of water.  No acute safety concerns.  No SI.  No problematic drug or alcohol use.    3/9/2023:  Since last visit we decreased Effexor-XR just slightly.  Unfortunately her anxiety has worsened on decreased dose.  Headaches and fatigue did not improve at all.  Since anxiety worsened we will go back up to 150 mg daily.  Patient prefers this over a trial of further decreased dose.  We will start a trial with propranolol to see if this helps improve anxiety and headaches.  May also help with her tremor.  Discussed watching for feeling too lightheaded or dizzy.  Patient also on low-dose lisinopril.  Last vital signs blood pressure 132/78 and pulse 96 on 2/24/2023.  No acute safety concerns.  No SI.  No problematic drug or alcohol use.    3/23/2023:  Patient overall doing very well.  Propranolol has been quite helpful.  Still having some anxiety  and headaches so we will continue to optimize therapy with propranolol some.  We will increase to 20 mg twice daily.  Neuropsychiatric testing reviewed.  Some mild deficits but nothing indicating a neurocognitive disorder at this time.  This was reassuring to the patient.  Patient will review results with neurologist at upcoming appointment in May.  No acute safety concerns.  No SI.  No problematic drug or alcohol use.    4/27/2023:  Patient overall with relative stability of mood and anxiety symptoms.  We will work to discontinue Ambien to see if we can manage on medication such as trazodone.  I still continue to worry about possibility of prodromal Parkinson's disease.  Patient reported her sister noted patient to be yelling and screaming in her sleep the other night.  This concerns me for possible REM sleep behavior disorder.  Could simply be related to antidepressant therapy-but given recent parkinsonism response to neuroleptic, symptoms should continue to be monitored closely.  Could again consider sleep medicine referral at some point in the future if needed.  Patient also continues to have headaches.  If patient remains stable would like to trial decreased dose of Effexor-XR to see if this helps improve headaches.  No acute safety concerns.  No SI.  No problematic drug or alcohol use.    5/11/2023:  Patient overall doing well.  Was able to replace Ambien with trazodone and still sleeping well.  Still some headaches but greatly improved off Ambien.  Went back to taking propranolol 20 mg twice daily and finds this most effective for her anxiety, tremor, etc.  Discussed we could consider lowering venlafaxine ER in a couple of months if still doing really well and still having some headaches.  Patient has a neurology appointment tomorrow for check-in.  No acute safety concerns.  No SI.  No problematic drug or alcohol use.    7/11/2023:  Patient overall doing well still.  Mood has remained stable.  Anxiety overall  manageable and stable as well.  Unfortunately continues to have headaches and feel fatigued.  Discussed I would like to move ahead with a trial of decreased venlafaxine.  Patient agreeable and will monitor mental health symptoms.  No acute safety concerns.  No SI.  No problematic drug or alcohol use.    10/10/2023:  Patient overall doing okay.  Unfortunately headaches and fatigue have persisted even with decreased dose of venlafaxine.  We will increase propranolol and changed to extended release so it is once a day to see if that is a little bit more helpful for anxiety and headaches.  Patient controlled decreased dose of venlafaxine and we may consider changing to something different after the holidays.  We would like to prevent any decompensation during the holiday season.  No acute safety concerns.  No SI.  No problematic drug or alcohol use.  Patient continues in therapy.    1/9/2024:  Patient overall doing relatively well.  Still getting headaches from venlafaxine so we will decrease the dose further to 75 mg daily to see if helpful without worsening mental health symptoms.  No acute safety concerns.  No SI.  No problematic drug or alcohol use.    2/28/2024:  Patient doing relatively well.  Still some anxiety but manageable.  Anxiety does not currently interfere with patient's ability to function or go about daily activities.  Easily able to distract self.  No major mood symptoms at this time.  Side effects from venlafaxine have drastically improved on lower dose.  If mental health symptoms/anxiety were to worsen in the future, would strongly consider transition to sertraline due to negative side effects on higher doses of venlafaxine.  No acute safety concerns.  No SI.  No problematic drug or alcohol use.    6/26/2024:  Patient overall doing very well.  Mood has been stable.  Anxiety manageable.  Does use Tylenol twice a day every day for headaches.  Discussed possibility of some rebound headaches.  Resources  sent to patient via [employer].  We will continue to monitor dizziness.  Blood pressures can be a little low at times.  We discussed trazodone can cause dizziness and orthostatic hypotension (blood pressure drops upon standing).  Patient wonders if it is more related to her lisinopril.  Patient encouraged to document blood pressures and present to primary care provider for ongoing evaluation.  No acute safety concerns.  No SI.  No problematic drug or alcohol use.  No medication changes today.    9/26/2024:  Patient overall doing well.  Relatively stable.  Managing anxiety symptoms well.  Sleeping well without nightly trazodone.  Discussed patient could consider massage therapy or acupuncture to see if it improves headaches.  Patient prefers not to change Effexor at this time.  Discussed patient could also talk with primary care provider about possible neurology referral for chronic headaches.  No acute safety concerns.  No SI.  No problematic drug or alcohol use.  No med changes today.    11/20/2024:  Pt doing ok - still some ongoing anxiety and headaches - possibly from Effexor. Pt will consider transitioning off Effexor-XR at next appt 1/2 (after the holidays). Could start fluoxetine 10 mg daily for 5 days (with effexor-xr 75) then decrease Effexor-XR to 37.5 mg (and increase fluoxetine to 20 mg daily) for 7 days then stop Effexor-xr and either continue of fluoxetine or trial a new medication (duloxetine?). No acute safety concerns. No SI. No problematic drug or alcohol use.      1/2/2025:  Patient unfortunately with some worsening symptoms.  Worsening chronic headaches.  Worsening tremor.  Ongoing balance struggles that are perhaps worse.  Struggling more with memory.  Anxiety greatly increased.  Patient agreeable to transitioning off venlafaxine and onto sertraline/Zoloft.  Patient also encouraged to follow up with neurology.  CT scan of head/brain ordered without contrast due to ongoing/worsening headaches and  cognitive concerns and balance issues.  Last CT scan of head completed in 2017.  No acute safety concerns.  No SI.  No problematic drug or alcohol use.    1/21/2025:  Overall doing quite well with transition to sertraline.  Patient will continue with the cross taper-titration.  It does seem she is possibly having some improvement since starting sertraline already.  Patient and daughter will continue to monitor for side effects.  No acute safety concerns.  No suicidality.  No problematic drug or alcohol use.  Started with new psychotherapist and feels it is a good connection.    2/21/2025:  Patient overall with ongoing anxiety.  We will give sertraline 100 mg daily more time to see if mild side effects resolve and to see if any additional symptoms improve.  Patient will be seen back after her upcoming neurology appointment that is scheduled for early April. Pt's CT scan of brain W/O contrast is scheduled for March.  Patient may consider trying to taper off trazodone to see if that helps improve her afternoons.  We talked about distractions and continuing to try to stay busy in the afternoon.  Historically this has been helpful.  No acute safety concerns.  No SI.  No problematic drug or alcohol use.  Continues in psychotherapy.    Medication side effects and alternatives were reviewed. Health promotion activities recommended and reviewed today. All questions addressed. Education and counseling completed regarding risks and benefits of medications and psychotherapy options. Recommend therapy for additional support.     Treatment Plan:   Continue trazodone 12.5-50 mg at bedtime as needed for sleep.  Discussed it is okay if you would like to trial discontinuation to see how you feel and sleep off the medication.  Continue propranolol EXTENDED RELEASE 60 mg ONCE daily for anxiety, tremor, headaches.  Continue sertraline/Zoloft 100 mg daily for anxiety.   Keep upcoming follow-up with your neurologist, Dr. Amato. Can call  [phone] to get scheduled.   Complete scheduled CT scan of head/brain.   Continue all other cares per primary care provider.   Continue all other medications as reviewed per electronic medical record today.   Safety plan reviewed. To the Emergency Department as needed or call after hours crisis line at [phone] or [phone]. [place] Crisis Text Line. Text [place] to 829786 or Suicide LifeLine Chat: suicidepreventionFundersClubline.org/chat  Continue individual psychotherapy for additional support and ongoing development of nonpharmacologic coping skills and strategies.  Schedule an appointment with me in 8 weeks or sooner as needed. Call [facility] at [phone] to schedule.  Follow up with primary care provider as planned or for acute medical concerns.  Call the psychiatric nurse line with medication questions or concerns at [phone].  [place] may be used to communicate with your provider, but this is not intended to be used for emergencies.    Administrative Billing:   Phone Call/Video Duration: 32 Minutes  Start: 2:25p  Stop: 2:57p    The longitudinal plan of care for the diagnosis(es)/condition(s) as documented were addressed during this visit. Due to the added complexity in care, I will continue to support [place] in the subsequent management and with ongoing continuity of care.    Patient Status:  Continuous care patient.     Signed:   Judy Pompa DO  [facility]    Disclaimer: This note consists of symbols derived from keyboarding, dictation and/or voice recognition software. As a result, there may be errors in the script that have gone undetected. Please consider this when interpreting information found in this chart.

## 2025-02-21 NOTE — PATIENT INSTRUCTIONS
Treatment Plan:   Continue trazodone 12.5-50 mg at bedtime as needed for sleep. Discussed it is okay if you would like to trial discontinuation to see how you feel and sleep off the medication.  Continue propranolol EXTENDED RELEASE 60 mg ONCE daily for anxiety, tremor, headaches.  Continue sertraline/Zoloft 100 mg daily for anxiety.   Keep upcoming follow-up with your neurologist, Dr. Amato. Can call [phone] to get scheduled.   Complete scheduled CT scan of head/brain.   Continue all other cares per primary care provider.   Continue all other medications as reviewed per electronic medical record today.   Safety plan reviewed. To the Emergency Department as needed or call after hours crisis line at [phone] or [phone]. [place] Crisis Text Line. Text [place] to 133640 or Suicide LifeLine Chat: suicidepreventioneÃ‡iftline.org/chat  Continue individual psychotherapy for additional support and ongoing development of nonpharmacologic coping skills and strategies.  Schedule an appointment with me in 8 weeks or sooner as needed. Call [facility] at [phone] to schedule.  Follow up with primary care provider as planned or for acute medical concerns.  Call the psychiatric nurse line with medication questions or concerns at [phone].  MyChart may be used to communicate with your provider, but this is not intended to be used for emergencies.    Patient Education   Collaborative Care Psychiatry Service  What to Expect  Here's what to expect from your Collaborative Care Psychiatry Service (CCPS).   About CCPS  CCPS means 2 people work together to help you get better. You'll meet with a behavioral health clinician and a psychiatric doctor. A behavioral health clinician helps people with mental health problems by talking with them. A psychiatric doctor helps people by giving them medicine.  How it works  At every visit, you'll see the behavioral health clinician (BHC) first. They'll talk with you  "about how you're doing and teach you how to feel better.   Then you'll see the psychiatric doctor. This doctor can help you deal with troubling thoughts and feelings by giving you medicine. They'll make sure you know the plan for your care.   CCPS usually takes 3 to 6 visits. If you need more visits, we may have you start seeing a different psychiatric doctor for ongoing care.  If you have any questions or concerns, we'll be glad to talk with you.  About visits  Be open  At your visits, please talk openly about your problems. It may feel hard, but it's the best way for us to help you.  Cancelling visits  If you can't come to your visit, please call us right away at [phone]. If you don't cancel at least 24 hours (1 full day) before your visit, that's \"late cancellation.\"  Being late to visits  Being very late is the same as not showing up. You will be a \"no show\" if:  Your appointment starts with a [facility], and you're more than 15 minutes late for a 30-minute (half hour) visit. This will also cancel your appointment with the psychiatric doctor.  Your appointment is with a psychiatric doctor only, and you're more than 15 minutes late for a 30-minute (half hour) visit.  Your appointment is with a psychiatric doctor only, and you're more than 30 minutes late for a 60-minute (full hour) visit.  If you cancel late or don't show up 2 times within 6 months, we may end your care.   Getting help between visits  If you need help between visits, you can call us Monday to Friday from 8 a.m. to 4:30 p.m. at [phone].  Emergency care  Call 911 or go to the nearest emergency department if your life or someone else's life is in danger.  Call 848 anytime to reach the national Suicide and Crisis hotline.  Medicine refills  To refill your medicine, call your pharmacy. You can also call [facility]'s Behavioral [facility] at [phone], Monday to Friday, 8 a.m. to 4:30 p.m. It can take 1 to 3 business days to get a " refill.   Forms, letters, and tests  You may have papers to fill out, like FMLA, short-term disability, and workability. We can help you with these forms at your visits, but you must have an appointment. You may need more than 1 visit for this, to be in an intensive therapy program, or both.  Before we can give you medicine for ADHD, we may refer you to get tested for it or confirm it another way.  We may not be able to give you an emotional support animal letter.  We don't do mental health checks ordered by the court.   We don't do mental health testing, but we can refer you to get tested.   Thank you for choosing us for your care.  For informational purposes only. Not to replace the advice of your health care provider. Copyright   2022 [facility]. All rights reserved. 10-20 [employer] 957150 - Rev 11/24.

## 2025-02-21 NOTE — NURSING NOTE
Current patient location: [id number]  [place] [place] [facility] [place] [facility] [address]    Is the patient currently in the state of [place]? YES    Visit mode: VIDEO    If the visit is dropped, the patient can be reconnected by:VIDEO VISIT: Text to cell phone:   Telephone Information:   Mobile [phone]       Will anyone else be joining the visit? NO  (If patient encounters technical issues they should call [phone])    Are changes needed to the allergy or medication list? Pt stated no changes to allergies and Pt stated no med changes    Are refills needed on medications prescribed by this physician? Discuss with provider    Rooming Documentation:  Questionnaire(s) completed    Reason for visit: SHANNAN QUEEN

## 2025-02-23 NOTE — PROGRESS NOTES
History     Chief Complaint   Patient presents with    Alcohol Intoxication    Abdominal Pain     HPI  Josefina Lincoln is a 30 year old female with a history of mood disorder NOS who presents after significant ETOH intoxication this evening, THC use, with additional cocaine use immediately prior to arrival. She developed abdominal pain prior to arrival prompting caller to call 911.  Patient has been drinking alcohol for the past 8 hours (and declining water) per report by her cousin who is at bedside, and was called by patient's brother to pick patient up.    Social history notable for patient is currently going through a divorce.    Of note she has a history of delayed emergence from general anesthesia with agitation.    ROS negative for fever, chills, or diarrhea.    Allergies:  Allergies   Allergen Reactions    Sulfamethoxazole-Trimethoprim Hives    Sertraline Hives    Varenicline Hives    Oxcarbazepine Rash     Problem List:    Patient Active Problem List    Diagnosis Date Noted    Encounter for triage in pregnant patient 10/09/2018     Priority: Medium    Obesity 12/02/2015     Priority: Medium    Contraception 01/23/2014     Priority: Medium    Mood disorder 01/07/2014     Priority: Medium    Interstitial cystitis 09/20/2013     Priority: Medium    ASCUS on Pap smear 04/19/2013     Priority: Medium    Nicotine use disorder 04/03/2013     Priority: Medium      Past Medical History:    Past Medical History:   Diagnosis Date    Abnormal cytological findings in specimens from other organs, systems and tissues     Chronic interstitial cystitis without hematuria     Encounter for insertion of intrauterine contraceptive device     Encounter for supervision of normal first pregnancy     Nicotine dependence, uncomplicated      Past Surgical History:    Past Surgical History:   Procedure Laterality Date    APPENDECTOMY OPEN      2008    CYSTOSCOPY      2012    OTHER SURGICAL HISTORY      11/12/2013,,PB VAGINAL  "Jolynn Haji is a 67 year old year old who is being evaluated via a billable video visit.    How would you like to obtain your AVS? MyChart  If you are dropped from the video visit, the video invite should be resent to: Text to cell phone: see Epic  Will anyone else be joining your video visit? No     Telemedicine Visit: The patient's condition can be safely assessed and treated via synchronous audio and visual telemedicine encounter.      Reason for Telemedicine Visit: Covid-19 Pandemic    Originating Site (Patient Location): Patient's home     Distant Site (Provider Location): Provider Remote Setting    Mode of Communication:  Video Conference via [employer]    As the provider I attest to compliance with applicable laws and regulations related to telemedicine.        Outpatient Psychiatric Progress Note    Name: Jolynn Haji   : 1955                    Primary Care Provider: Helga Ibarra MD   Therapist: None    PHQ-9 scores:  PHQ-9 SCORE 2022   PHQ-9 Total Score - - -   PHQ-9 Total Score MyChart - 4 (Minimal depression) 7 (Mild depression)   PHQ-9 Total Score 6 4 7   Some encounter information is confidential and restricted. Go to Review Flowsheets activity to see all data.       TIFFANY-7 scores:  TIFFANY-7 SCORE 2022   Total Score - - -   Total Score - 5 (mild anxiety) 8 (mild anxiety)   Total Score 3 5 8       Patient Identification:  Patient is a 67 year old,   White Choose not to answer female  who presents for return visit with me.  Patient is currently retired. Patient attended the phone/video session with her daughter Malini rucker. Patient prefers to be called: \" Azalea\".    Interim History:  I last saw Jolynn Haji for outpatient psychiatry return visit on 2022. During that appointment, we:     Discontinue/decrease Lexapro: Take 5 mg for 2 weeks and then discontinue the medication.    Continue Ambien 5 mg at bedtime as needed for sleep. " "Do not take with alcohol or opioid pain medication. Make sure to have 8-10 hours to devote to sleep after taking. Careful/caution for falls in middle of night.     Continue Effexor-XR/venlafaxine ER 75 mg daily for depression and anxiety.    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     9/1: Overall doing okay.  Still feeling quite anxious at times and sleep has been a little bit more disrupted lately again.  Has been feeling intermittently nauseous.  There does not seem to be a particular time or reason.  Sometimes at night sometimes in the mornings or afternoon.  Neurologist at recent appointment suspects patient did have drug-induced parkinsonism and that if she does have underlying Parkinson's disease symptoms/disease currently mild and there would be no interventions at this time.  Patient continues to feel quite foggy and easily overwhelmed.  No acute safety concerns.  No SI.  No problematic drug or alcohol use.    Per [facility], DINESH Kiran, during today's team-based visit:   Update: still not sleeping well, getting nauseous and worried it could be related to the Ambien or possibly the Effexor. Mood continues to improve but \"anxious to feel better.\" Has been spending time with friends and finds that helpful. Has moments of feeling \"thick headed\" during the day and worries is related to Ambien. Energy has increased during the day, able to accomplish more. Better concentrate and comprehending what she does read.  [facility] validated patient for her continued distress.  [facility] normalized patient needing to allow herself, her brain and her medication more time to adjust to all of these changes.  [facility] processed patient using kindness with herself and trying not to compare how she is doing now with how she was doing in the past.  [facility] discussed patient using the idea of pacing and focusing on doing something for a small period of time instead of feeling that she needs to accomplish the " DELIVERY UFM ONLY     Family History:    Family History   Problem Relation Age of Onset    Other - See Comments Father         blood clot disorder, possible factor V/Stroke    Heart Disease Father         Heart Disease    Thyroid Disease Mother         Thyroid Disease    Other - See Comments Sister         factor V     Social History:  Marital Status:  Single [1]  Social History     Tobacco Use    Smoking status: Every Day     Current packs/day: 0.30     Types: Vaping Device, Cigarettes    Smokeless tobacco: Never   Substance Use Topics    Alcohol use: Yes    Drug use: Unknown     Types: Other     Comment: Drug use: No      Medications:    cloNIDine (CATAPRES) 0.1 MG tablet  levonorgestrel-ethinyl estradiol (NORDETTE) 0.15-30 MG-MCG per tablet  OXcarbazepine (TRILEPTAL) 300 MG tablet      Review of Systems    Physical Exam   BP: (!) 164/151  Pulse: 108  Temp: (!) 96.7  F (35.9  C)  Resp: 20    Physical Exam  Exam:  Constitutional: slurred speech, intermittently verbally redirectable, non-toxic appearing  Head: Normocephalic.  Neck: Supple  Cardiovascular: Tachycardic with regular rhythm  Respiratory: CTAB  Gastrointestinal: Abdomen soft, non-distended  Musculoskeletal: extremities normal- no gross deformities noted, moving all 4 extremities  Skin: no suspicious lesions or rashes  Psychiatric: Dysphoric affect, slurred speech    ED Course        Procedures              EKG Interpretation:      Interpreted by Dallas Vance MD  Time reviewed: 2:15 am  Symptoms at time of EKG: Abdominal Pain, ETOH Intoxication   Rhythm: normal sinus   Rate: normal  Axis: normal  Ectopy: none  Conduction: normal  ST Segments/ T Waves: No ST-T wave changes  Q Waves: none  Comparison to prior: She does have borderline prolonged Qtc on my review, machine read is 511 ms, otherwise compared to EKG 7/8/2022 appears unchanged from prior.  Clinical Impression: Sinus tachycardia, borderline QTc prolongation, otherwise normal EKG    None          Results for orders placed or performed during the hospital encounter of 02/23/25 (from the past 24 hours)   Extra Tube ([place] Draw)    Narrative    The following orders were created for panel order Extra Tube ([place] Draw).  Procedure                               Abnormality         Status                     ---------                               -----------         ------                     Extra Blue Top Tube[[id number]]                              In process                 Extra Red Top Tube[[id number]]                               In process                 Extra Green Top (Lithium...[[id number]]                      In process                 Extra Purple Top Tube[[id number]]                            In process                 Extra Green Top (Lithium...[[id number]]                      In process                   Please view results for these tests on the individual orders.       Medications   sodium chloride 0.9% BOLUS 1,000 mL (1,000 mLs Intravenous $New Bag 2/23/25 0213)   OLANZapine (zyPREXA) IV injection 5 mg (5 mg Intravenous $Given 2/23/25 0209)   LORazepam (ATIVAN) injection 1 mg (1 mg Intravenous $Given 2/23/25 0212)     Assessments & Plan (with Medical Decision Making)     Josefina Lincoln is a 30 year old female who presents clinically intoxicated with EtOH and sympathomimetic intoxicants after she reportedly was drinking EtOH for the past 8 hours.  Vitals notable for tachycardia to 110 bpm, hypertension to 164 systolic.  She is protecting her airway, she appears uncomfortable however nontoxic.    Given her abdominal pain and tachycardia and intoxication, given Olanzapine 5 mg IV and Ativan 1 mg IV with improvement in her symptoms.  She is now resting comfortably with normal respirations on ETCO2 monitoring.  Given her intoxication and complaints of severe abdominal pain, I did consider the possibility of ACS in the setting of cocaine intoxication.  We obtained an EKG which appears similar  "entire task.  [facility] also discussed patient allowing family to help and simplifying where she can.       Patient indicates she saw a neurologist this week in order to establish baseline.  She was told by that provider but there was nothing to worry about and not to have anxiety about her health.  Patient has follow-up in 3 months.  Patient is hopeful that the symptoms she was experiencing were related to side effects of Abilify and not something more.     SI: passive thoughts of wishing life would get better, how long can I live like this. Denies having a plan or intent. No prior attempts. Has people and things to live for. Able to redirect self and feels safe. Has supports she can reach out to and aware of crisis resources.     Tx: not in therapy, [facility] to place referral for [facility] and health psychologist     Sleep: able to fall asleep but then wakes up at 3 am and cant get back to sleep. Not noticing any anxious thoughts. Sometimes wakes up due to nausea and waits for it to pass.  [facility] used CBT I to review sleep practices and praised patient for having a healthy routine and relaxation time.  [facility] discussed safe space visualization and patient trying to use this when she wakes up to see if it assists with her getting back to sleep.  Appetite: improved, had lost 30 pounds and feels that is getting back to better place     Etoh: denies  Substance: denies  Nicotine: denies  Caffeine: 1 cup of coffee per day     Most Important: side effect of feeling nauseous and still not sleeping well, sometimes nausea that wakes up. Is there an Ambien slow release or something else to stop the nausea?     Past Psychiatric Med Trials:  Psych Meds at Intake:  lexapro 20 mg daily  abilify 5 mg daily      Past Psych Meds:  Amitriptyline  Quetiapine  Mirtazapine - stopped \"because I was sleeping through the night\"    Psychiatric ROS:  Jolynn Haji reports mood has been: About the same since last visit  Anxiety has been: Worse  Sleep has been: " Worse  Seema sxs: None  Psychosis sxs: N/A  ADHD/ADD sxs: N/A  PTSD sxs: N/A  PHQ9 and GAD7 scores were reviewed today if completed.   Medication side effects: Suspected drug-induced parkinsonism from Abilify  Current stressors include: Symptoms and See HPI above  Coping mechanisms and supports include: Family, Hobbies and Friends    Current medications include:   Current Outpatient Medications   Medication Sig     clindamycin (CLINDAGEL) 1 % external gel Apply topically daily     lactobacillus rhamnosus, GG, (CULTURELL) capsule Take 1 capsule by mouth daily     lisinopril (ZESTRIL) 2.5 MG tablet Take 1 tablet (2.5 mg) by mouth daily     venlafaxine (EFFEXOR XR) 75 MG 24 hr capsule Take 1 capsule (75 mg) by mouth daily     zolpidem (AMBIEN) 5 MG tablet Take 1 tablet (5 mg) by mouth nightly as needed for sleep     Current Facility-Administered Medications   Medication     lidocaine 1 % injection 3 mL     lidocaine 1 % injection 4 mL     triamcinolone (KENALOG-40) injection 40 mg       Past Medical/Surgical History:  Past Medical History:   Diagnosis Date     Abnormal glandular Papanicolaou smear of cervix- ASCUS in 2000 12/5/2003    ASC H- 2000     Basal cell carcinoma of leg, right 05/2016    Dr Rogers     Colon polyps 05/2018    tubular adenoam - due 5 yrs - 2 mm     Concussion without loss of consciousness 4/12/2017     Hidradenitis     left  groin     HSIL (high grade squamous intraepithelial lesion) on Pap smear of cervix 7/5/2000    Normal paps from 2001 to 2016/cc     Hypertension goal BP (blood pressure) < 140/90      OA (osteoarthritis) of knee     moderate     Postmenopausal since 7/2008      Shingles 7/23/2010    left      Squamous cell carcinoma of skin, unspecified      Synovial cyst of popliteal space       has a past medical history of Abnormal glandular Papanicolaou smear of cervix- ASCUS in 2000 (12/5/2003), Basal cell carcinoma of leg, right (05/2016), Colon polyps (05/2018), Concussion without  to prior without any ST-T wave findings concerning for acute ischemia.  Machine read notes QTc prolongation, however clinically does not appear to have a prolonged QTc to me.  We obtain broad set of labs which did reveal hypokalemia will replete with magnesium IV and potassium IV.  Serum magnesium reported as normal, however this lab assay is often unreliable and will give empiric repletion.  Given 2 L of IV crystalloids.  Anticipate lactate elevation is secondary to EtOH intoxication.    Will monitor while she clears clinically, counseled with patient's cousin and patient that at times significant intoxication and parenteral medications for agitation may require intubation for airway protection however patient is protecting her airway clinically at this time.  UDS returned positive for cocaine and THC. Patient cleared appropriately and was able to ambulate to commode with assistance. Continues to clear appropriately. Local mental health crisis response team contacted, will see patient if she is willing to engage in local resources, RN team will discuss when patient more alert.  Patient monitored clinically and cleared appropriately, once lucid she was offered mental health resources, and she is welcomed to return with any concerns. Signed out to Dr. Castro at change of shift.    I have reviewed the nursing notes.    I have reviewed the findings, diagnosis, plan and need for follow up with the patient.     Medical Decision Making  The patient's presentation was of high complexity (an acute health issue posing potential threat to life or bodily function).    The patient's evaluation involved:  review of external note(s) from 1 sources (note from [facility] 9/2024 does reveal she has presented in the past with chest pain and had negative workup including negative troponin, did have tachycardia at this time as well)  ordering and/or review of 3+ test(s) in this encounter (serum EtOH level 0.05, lactate 3, EKG obtained and  loss of consciousness (4/12/2017), Hidradenitis, HSIL (high grade squamous intraepithelial lesion) on Pap smear of cervix (7/5/2000), Hypertension goal BP (blood pressure) < 140/90, OA (osteoarthritis) of knee, Postmenopausal (since 7/2008 ), Shingles (7/23/2010), Squamous cell carcinoma of skin, unspecified, and Synovial cyst of popliteal space.    She has no past medical history of Malignant melanoma (H) or Squamous cell carcinoma.    Social History:  Reviewed. No changes to social history except as noted above in HPI.    Vital Signs:   None. This is phone/video visit.     Labs:  Most recent laboratory results reviewed and the pertinent results include:   TSH   Date Value Ref Range Status   04/25/2022 1.30 0.40 - 4.00 mU/L Final   01/13/2021 2.07 0.40 - 4.00 mU/L Final     Lab Results   Component Value Date    WBC 8.1 04/25/2022    WBC 6.4 01/13/2021     Lab Results   Component Value Date    RBC 4.58 04/25/2022    RBC 4.54 01/13/2021     Lab Results   Component Value Date    HGB 13.6 04/25/2022    HGB 13.3 01/13/2021     Lab Results   Component Value Date    HCT 41.1 04/25/2022    HCT 41.7 01/13/2021     No components found for: MCT  Lab Results   Component Value Date    MCV 90 04/25/2022    MCV 92 01/13/2021     Lab Results   Component Value Date    MCH 29.7 04/25/2022    MCH 29.3 01/13/2021     Lab Results   Component Value Date    MCHC 33.1 04/25/2022    MCHC 31.9 01/13/2021     Lab Results   Component Value Date    RDW 12.9 04/25/2022    RDW 13.1 01/13/2021     Lab Results   Component Value Date     04/25/2022     01/13/2021     Last Comprehensive Metabolic Panel:  Sodium   Date Value Ref Range Status   04/25/2022 137 133 - 144 mmol/L Final   01/13/2021 140 133 - 144 mmol/L Final     Potassium   Date Value Ref Range Status   04/25/2022 4.4 3.4 - 5.3 mmol/L Final   01/13/2021 4.1 3.4 - 5.3 mmol/L Final     Chloride   Date Value Ref Range Status   04/25/2022 105 94 - 109 mmol/L Final   01/13/2021  non-ischemic)    The patient's management necessitated high risk (a parenteral controlled substance) and high risk (drug therapy requiring intensive monitoring (given IV Ativan, requires end-tidal CO2 monitoring given her intoxication and requiring parenteral benzodiazepines)).    Current Discharge Medication List        Final diagnoses:   Altered mental status, unspecified altered mental status type   Adjustment disorder with depressed mood       2/23/2025   [facility]     Dallas Vance MD  02/23/25 0608       Dallas Vance MD  02/23/25 0658     108 94 - 109 mmol/L Final     Carbon Dioxide   Date Value Ref Range Status   01/13/2021 28 20 - 32 mmol/L Final     Carbon Dioxide (CO2)   Date Value Ref Range Status   04/25/2022 26 20 - 32 mmol/L Final     Anion Gap   Date Value Ref Range Status   04/25/2022 6 3 - 14 mmol/L Final   01/13/2021 4 3 - 14 mmol/L Final     Glucose   Date Value Ref Range Status   04/25/2022 104 (H) 70 - 99 mg/dL Final   01/13/2021 91 70 - 99 mg/dL Final     Comment:     Fasting specimen     Urea Nitrogen   Date Value Ref Range Status   04/25/2022 23 7 - 30 mg/dL Final   01/13/2021 18 7 - 30 mg/dL Final     Creatinine   Date Value Ref Range Status   04/25/2022 0.78 0.52 - 1.04 mg/dL Final   01/13/2021 0.68 0.52 - 1.04 mg/dL Final     GFR Estimate   Date Value Ref Range Status   04/25/2022 83 >60 mL/min/1.73m2 Final     Comment:     Effective December 21, 2021 eGFRcr in adults is calculated using the 2021 CKD-EPI creatinine equation which includes age and gender (Mercedes et al., NEJ, DOI: 10.1056/MEVYvj1580898)   01/13/2021 >90 >60 mL/min/[1.73_m2] Final     Comment:     Non  GFR Calc  Starting 12/18/2018, serum creatinine based estimated GFR (eGFR) will be   calculated using the Chronic Kidney Disease Epidemiology Collaboration   (CKD-EPI) equation.       Calcium   Date Value Ref Range Status   04/25/2022 9.3 8.5 - 10.1 mg/dL Final   01/13/2021 8.8 8.5 - 10.1 mg/dL Final     Bilirubin Total   Date Value Ref Range Status   04/25/2022 0.4 0.2 - 1.3 mg/dL Final   01/13/2021 0.8 0.2 - 1.3 mg/dL Final     Alkaline Phosphatase   Date Value Ref Range Status   04/25/2022 64 40 - 150 U/L Final   01/13/2021 72 40 - 150 U/L Final     ALT   Date Value Ref Range Status   04/25/2022 21 0 - 50 U/L Final   01/13/2021 17 0 - 50 U/L Final     AST   Date Value Ref Range Status   04/25/2022 11 0 - 45 U/L Final   01/13/2021 13 0 - 45 U/L Final     Review of Systems:  10 systems (general, cardiovascular, respiratory, eyes, ENT, endocrine, GI,  , M/S, neurological) were reviewed. Most pertinent finding(s) is/are: Some chronic pains, see HPI above. The remaining systems are all unremarkable.    Mental Status Examination (limited as this is by phone/video):  Appearance: Awake, alert, appears stated age, no acute distress, well-groomed   Attitude:  cooperative, pleasant   Motor: Psychomotor slowing, not formally tested  Oriented to:  person, place, time, and situation  Attention Span and Concentration:  normal  Speech:  Still a little delayed/hesitant responses-much improved, normal volume  Language: intact  Mood: anxious  Affect:  intensity is flat but starting to be more appropriately reactive the longer patient is off of Abilify  Associations:  no loose associations  Thought Process:  logical, linear and goal oriented  Thought Content:  no evidence of suicidal ideation or homicidal ideation, no evidence of psychotic thought, no auditory hallucinations present and no visual hallucinations present  Recent and Remote Memory:  Not formally assessed. Possibly some deficits? Could consider neuropsychological testing  Fund of Knowledge: appropriate  Insight:  fair-improving  Judgment:  intact, adequate for safety  Impulse Control:  intact    Suicide Risk Assessment:  Today Jolynn Haji reports no suicidal ideation. Based on all available evidence including the factors cited above, Jolynn Haji does not appear to be at imminent risk for self-harm, does not meet criteria for a 72-hr hold, and therefore remains appropriate for ongoing outpatient level of care.  A thorough assessment of risk factors related to suicide and self-harm have been reviewed and are noted above. The patient convincingly denies suicidality on several occasions. Local community safety resources printed and reviewed for patient to use if needed. There was no deceit detected, and the patient presented in a manner that was believable.     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  >> Colpo: Neg  2001 NIL   2003 NIL pap, Neg HPV  [id number], 2016  NIL pap  7/25/19 NIL pap, Neg HPV    Criteria necessary to stop screening per ASCCP guidelines:  Older than 65 years  Three consecutive negative cytology results or two consecutive negative cotest results within the previous 10 years, with the most recent being performed within the last 5 years.   (Women with hx of MADDIE 2 or 3, or adenocarcinoma in situ should continue screening for full 20 years, even if this goes past age 65).         Hyperlipidemia LDL goal <130 11/30/2018     Priority: Medium     Basal cell carcinoma      Priority: Medium     Colon polyps 05/01/2018     Priority: Medium     Primary localized osteoarthrosis of left lower leg 02/19/2018     Priority: Medium     Other secondary osteoarthritis of left knee 01/29/2018     Priority: Medium     Chronic constipation 09/14/2017     Priority: Medium     Raynaud's phenomenon without gangrene 09/14/2017     Priority: Medium     Insomnia, unspecified type 07/25/2017     Priority: Medium     TIFFANY (generalized anxiety disorder) 05/23/2017     Priority: Medium     Adjustment disorder with mixed anxiety and depressed mood- mild-moderate  at this time 05/11/2017     Priority: Medium     Family history of celiac disease  05/11/2017     Priority: Medium     Basal cell carcinoma of leg, right 05/01/2016     Priority: Medium     Sun-damaged skin 04/21/2016     Priority: Medium     Seasonal allergic rhinitis 04/21/2016     Priority: Medium     Dupuytren's disease of palm - right 4th digit flexor tendon 06/11/2015     Priority: Medium     History of migraine headaches- assoc. with nausea/vomiting - resolved around menopause - were  premenstrual  01/08/2014     Priority: Medium     Rosacea 05/01/2013     Priority: Medium     Numbness of feet- distal feet R>L  02/21/2011     Priority: Medium     NUMBNESS AND TINGLING OF FOOT - bilateral -mild  08/25/2010     Priority: Medium     Osteopenia 01/02/2009     Priority: Medium     Lipidosis 10/24/2006     Priority: Medium     Problem list name updated by automated process. Provider to review       Localized osteoarthritis of hand 11/01/2005     Priority: Medium     Problem list name updated by automated process. Provider to review       Stress incontinence, female - mild 10/23/2012     Priority: Low     Cough 10/23/2012     Priority: Low     Acute bronchitis- recurrent- ? related to mold in school building- pt has since retired and no further bronchitis 10/23/2012     Priority: Low     Postmenopausal      Priority: Low     DERMATOPHYTOSIS OF NAIL- toenails  03/14/2006     Priority: Low     trichophyton rubrum on culture - 1/06        Synovial cyst of popliteal space      Priority: Low     Hidradenitis 12/05/2003     Priority: Low       Psychosocial & Contextual Factors: see HPI above    Assessment:  From Intake, 5/12/2022:  Jolynn Haji is a 66-year-old female with a history including anxiety, depression, insomnia status post concussion in April 2017.  No major mental health struggles prior to concussion in 2017.  Patient was started on Lexapro, quetiapine, mirtazapine to help with her symptoms.  Patient had been feeling much better on that medication combination and once she was sleeping well  again quetiapine and mirtazapine were discontinued.  This past January the patient started to have some worsening symptoms and Abilify was added.  Patient has felt like her current medications of Lexapro and Abilify have been helpful but reports starting to feel really slowed and numb the past several weeks.  She also reported a weight loss of nearly 20 pounds over the last 6 weeks versus several months (patient was not very sure). I am wondering if she could be experiencing side effects of her increased Lexapro dose.  She is agreeable to decreasing her dose slightly to 15 mg daily.  Could also consider decreasing Abilify in the case it is not her Lexapro.  She also desires to sleep a little bit better than she has been.  Could consider restarting mirtazapine at bedtime for sleep and to also augment her Lexapro.  Could also be beneficial for patient to undergo cognitive screening to check in and see what her baseline is status post concussion.  No acute safety concerns.  No SI.  No problematic drug or alcohol use.    5/24/2022:  Patient with some slight improvements of possible medication related side effects after decreasing Lexapro.  I am wondering if there might be more robust improvements with decreasing Abilify/aripiprazole.  Possible drug-induced Parkinsonism?  She does seem to have delayed/slowed speech, masked facies.  Her gait and other movements unable to be observed but she does report other struggles that may be consistent with such a presentation.  We are going to decrease her Abilify down to 2 mg daily and I will see her back in just a few weeks.  We will likely discontinue her Abilify at her next visit.  I will message her primary care provider to get some collateral regarding previous baseline functioning prior to starting Abilify.  History of TBI could be confounding presentation.  Could consider neurology consult.    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   Instructed to stop Abilify today.  We will monitor for improvement of symptoms.  If symptoms do not improve may need to refer to neurology.  If symptoms start to improve we will consider low-dose methylphenidate augmentation of Lexapro next visit to help with low energy, mood, focus and concentration, cognition, s/p TBI if still clinically indicated.  We would discuss risks and benefits with patient and her daughter at that time.  No other acute safety concerns or SI.  No problematic drug or alcohol use.  If patient starts to struggle with sleep would restart mirtazapine at bedtime.    7/28/2022:  Pt still with sxs consistent with drug-induced Parksinsonism.  Neurology consult will be placed so that patient might get scheduled sooner than later due to the typically long wait.  We will start Ambien in hopes we can get her sleeping much better.  Discussed risks and benefits of its use, particularly in the geriatric population.  Lexapro has not been as helpful as it helped for her anxiety.  We will taper this medication and start Effexor/venlafaxine in hopes that might be more effective for her symptoms.  No acute safety concerns.  No SI.  No problematic drug or alcohol use.    8/11/2022:  Overall with continued improved symptoms.  Hopefully patient will continue to have improvement of her symptoms off of Abilify.  Patient will be seeing neurology soon.  Encouraged to keep appointment.  We will continue taper off of Lexapro and continue on monotherapy with venlafaxine.  We will also continue Ambien at this time for sleep since patient is sleeping much better on the medication with improved symptoms and tolerating well.  No acute safety concerns.  No problematic drug or alcohol use.    9/1/2022:  Ongoing anxiety, still intense at times.  Still not sleeping very well.  Could consider sleep evaluation, but patient quite overwhelmed right now with various referrals and doctors appointments.  Briseyda had been working quite  well but not keeping her sleep now.  Still falling asleep relatively well.  We will transition to Ambien CR to see if that is more helpful.  We will continue to consider sleep evaluation to rule out possibility of REM behavior sleep disorder.  We will also transition venlafaxine to bedtime to see if that helps with any possible nausea.  Working with neurology to monitor for Parkinson's disease.  I do recommend neuropsychological testing due to ongoing possible cognitive difficulties/struggles.  No acute safety concerns.  No SI.  No problematic drug or alcohol use.    Medication side effects and alternatives were reviewed. Health promotion activities recommended and reviewed today. All questions addressed. Education and counseling completed regarding risks and benefits of medications and psychotherapy options. Recommend therapy for additional support.     Treatment Plan:    Change Ambien to Ambien CR (extended release version) 6.25 mg at bedtime as needed for sleep. Do not take with alcohol or opioid pain medication. Make sure to have 8-10 hours to devote to sleep after taking. Careful/caution for falls in middle of night.     Continue Effexor-XR/venlafaxine ER 75 mg daily for depression and anxiety.  Can try taking at bedtime.    Tomorrow, 9/2, take at lunch time for 1 day    9/3 take at dinnertime for 1 day    Then, on 9/4, start taking your dose at bedtime.      Start ondansetron/Zofran 4 mg every 8 hrs as needed for nausea    Continue to follow with neurology as they recommend.      Continue all other cares per primary care provider.     Continue all other medications as reviewed per electronic medical record today.     Safety plan reviewed. To the Emergency Department as needed or call after hours crisis line at [phone] or [phone]. [place] Crisis Text Line. Text [place] to 956592 or Suicide LifeLine Chat: suicidepreventionlifeline.org/chat    Consider individual psychotherapy for additional support and  ongoing development of nonpharmacologic coping skills and strategies.    Schedule an appointment with me in 3-4 weeks or sooner as needed. Call [facility] at [phone] to schedule.    Follow up with primary care provider as planned or for acute medical concerns.    Call the psychiatric nurse line with medication questions or concerns at [phone].    MyChart may be used to communicate with your provider, but this is not intended to be used for emergencies.    Administrative Billing:   Phone Call/Video Duration: 25 Minutes  Start: 10:42a  Stop: 11:07a    Time spent with patient was 25 minutes and greater than 50% of time or 13 minutes was spent in counseling and coordination of care regarding above diagnoses and treatment plan.    Patient Status:  Patient will continue to be seen for ongoing consultation and stabilization.    Signed:   Judy Pompa DO  [facility]    Disclaimer: This note consists of symbols derived from keyboarding, dictation and/or voice recognition software. As a result, there may be errors in the script that have gone undetected. Please consider this when interpreting information found in this chart.

## 2025-02-27 ENCOUNTER — HOSPITAL ENCOUNTER (OUTPATIENT)
Dept: MAMMOGRAPHY | Facility: CLINIC | Age: 70
Discharge: HOME | End: 2025-02-27
Attending: FAMILY MEDICINE
Payer: COMMERCIAL

## 2025-02-27 DIAGNOSIS — Z12.31 VISIT FOR SCREENING MAMMOGRAM: ICD-10-CM

## 2025-02-27 PROCEDURE — 77063 BREAST TOMOSYNTHESIS BI: CPT

## 2025-02-28 ASSESSMENT — ANXIETY QUESTIONNAIRES
7. FEELING AFRAID AS IF SOMETHING AWFUL MIGHT HAPPEN: NOT AT ALL
1. FEELING NERVOUS, ANXIOUS, OR ON EDGE: MORE THAN HALF THE DAYS
5. BEING SO RESTLESS THAT IT IS HARD TO SIT STILL: SEVERAL DAYS
8. IF YOU CHECKED OFF ANY PROBLEMS, HOW DIFFICULT HAVE THESE MADE IT FOR YOU TO DO YOUR WORK, TAKE CARE OF THINGS AT HOME, OR GET ALONG WITH OTHER PEOPLE?: SOMEWHAT DIFFICULT
7. FEELING AFRAID AS IF SOMETHING AWFUL MIGHT HAPPEN: NOT AT ALL
GAD7 TOTAL SCORE: 7
IF YOU CHECKED OFF ANY PROBLEMS ON THIS QUESTIONNAIRE, HOW DIFFICULT HAVE THESE PROBLEMS MADE IT FOR YOU TO DO YOUR WORK, TAKE CARE OF THINGS AT HOME, OR GET ALONG WITH OTHER PEOPLE: SOMEWHAT DIFFICULT
4. TROUBLE RELAXING: SEVERAL DAYS
2. NOT BEING ABLE TO STOP OR CONTROL WORRYING: MORE THAN HALF THE DAYS
6. BECOMING EASILY ANNOYED OR IRRITABLE: NOT AT ALL
3. WORRYING TOO MUCH ABOUT DIFFERENT THINGS: SEVERAL DAYS

## 2025-03-01 ASSESSMENT — PATIENT HEALTH QUESTIONNAIRE - PHQ9
SUM OF ALL RESPONSES TO PHQ QUESTIONS 1-9: 6
SUM OF ALL RESPONSES TO PHQ QUESTIONS 1-9: 6
10. IF YOU CHECKED OFF ANY PROBLEMS, HOW DIFFICULT HAVE THESE PROBLEMS MADE IT FOR YOU TO DO YOUR WORK, TAKE CARE OF THINGS AT HOME, OR GET ALONG WITH OTHER PEOPLE: SOMEWHAT DIFFICULT

## 2025-03-03 ENCOUNTER — VIRTUAL VISIT (OUTPATIENT)
Facility: CLINIC | Age: 70
End: 2025-03-03
Payer: COMMERCIAL

## 2025-03-03 DIAGNOSIS — F41.1 GENERALIZED ANXIETY DISORDER: Primary | ICD-10-CM

## 2025-03-03 PROCEDURE — 90834 PSYTX W PT 45 MINUTES: CPT | Mod: 95

## 2025-03-03 NOTE — PROGRESS NOTES
[facility]                                     Progress Note    Patient Name: Jolynn Haji  Date: 3/3/2025         Service Type: Individual      Session Start Time: 2:00 pm  Session End Time: 2:39 pm     Session Length: 39 min    Session #: 5 (previous therapy with Erika RAIN)    Attendees: Client attended alone    Service Modality:  Video Visit:      Provider verified identity through the following two step process.  Patient provided:  Patient address    Telemedicine Visit: The patient's condition can be safely assessed and treated via synchronous audio and visual telemedicine encounter.      Reason for Telemedicine Visit: Patient convenience (e.g. access to timely appointments / distance to available provider)    Originating Site (Patient Location): Patient's home    Distant Site (Provider Location): Provider Remote Setting- Home Office    Consent:  The patient/guardian has verbally consented to: the potential risks and benefits of telemedicine (video visit) versus in person care; bill my insurance or make self-payment for services provided; and responsibility for payment of non-covered services.     Patient would like the video invitation sent by:  My Chart    Mode of Communication:  [facility] Conference [facility]    Distant Location (Provider):  Off-site    As the provider I attest to compliance with applicable laws and regulations related to telemedicine.    DATA  Interactive Complexity: No  Crisis: No      Progress Since Last Session (Related to Symptoms / Goals / Homework):   Symptoms: No change anxiety    Homework: Achieved / completed to satisfaction      Episode of Care Goals: Satisfactory progress - ACTION (Actively working towards change); Intervened by reinforcing change plan / affirming steps taken     Current / Ongoing Stressors and Concerns:   Off Venlafaxene. Current functioning, cognitive questions, overwhelm. Tired of feeling anxious     Treatment Objective(s) Addressed in This  Session:   Sleep hygiene  Grounding skills, mindfulness  Gratitude practice  Anxiety triggers     Intervention:   Interpersonal Therapy: provided active listening and validation of experience with stressors. Utilized rapport building, reinforced grounding skills, sleep hygiene strategies,GLAD gratitude practice.   Reinforced Leaves on a Stream mindfulness practice. Provided psychoeducation on panic, neural pathways.     Assessments completed prior to visit:  The following assessments were completed by patient for this visit:  PHQ2:       5/3/2023     1:18 PM 3/21/2023    11:20 AM 2/23/2023     7:15 PM 1/31/2023     3:21 PM 1/12/2023     4:28 PM 12/15/2022     9:12 PM 12/8/2022     9:38 AM   PHQ-2 ( 1999 [employer])   Q1: Little interest or pleasure in doing things   0 0 0  0   Q2: Feeling down, depressed or hopeless   0 0 0  0   PHQ-2 Score   0 0 0  0   Q1: Little interest or pleasure in doing things   Not at all Not at all Not at all  Not at all   Q2: Feeling down, depressed or hopeless   Not at all Not at all Not at all  Not at all   PHQ-2 Score Incomplete Incomplete 0 0 0 Incomplete 0     PHQ9:       12/10/2024     5:27 PM 12/12/2024     1:09 PM 12/17/2024     2:23 PM 1/1/2025    11:51 AM 1/8/2025    11:26 AM 2/5/2025    11:26 AM 3/1/2025     4:54 PM   PHQ-9 SCORE   PHQ-9 Total Score MyChart 2 (Minimal depression)  10 (Moderate depression) 8 (Mild depression) 11 (Moderate depression) 7 (Mild depression) 6 (Mild depression)   PHQ-9 Total Score 2  12 10  8  11  7  6        Patient-reported     GAD2:       10/8/2024     4:08 PM 11/18/2024     9:17 AM 12/2/2024     5:19 PM 12/28/2024     3:34 PM 1/28/2025     3:51 PM 2/18/2025     3:52 PM 3/2/2025    12:35 PM   TIFFANY-2   Feeling nervous, anxious, or on edge 1 1 2 3 3 2 3   Not being able to stop or control worrying 0 0 1 2 1 2 1   TIFFANY-2 Total Score 1 1  3  5  4  4  4        Patient-reported     GAD7:       12/12/2024     1:09 PM 12/17/2024     2:25 PM 1/6/2025     8:49 AM  1/8/2025    11:28 AM 1/28/2025     3:51 PM 2/18/2025     3:52 PM 2/28/2025     8:30 PM   TIFFANY-7 SCORE   Total Score  8 (mild anxiety) 9 (mild anxiety) 6 (mild anxiety) 5 (mild anxiety) 7 (mild anxiety) 7 (mild anxiety)   Total Score 5 8  9  6  5  7  7        Patient-reported     CAGE-AID:       6/6/2017    12:05 PM 5/12/2022     2:26 PM   CAGE-AID Total Score   Total Score  0    0   Total Score MyChart  0 (A total score of 2 or greater is considered clinically significant)       Information is confidential and restricted. Go to Review Flowsheets to unlock data.     PROMIS 10-Global Health (only subscores and total score):       2/25/2024     2:04 PM 5/27/2024     7:01 PM 6/9/2024     8:48 PM 6/23/2024     8:01 PM 9/23/2024     9:33 AM 10/8/2024     4:11 PM 12/28/2024     3:39 PM   PROMIS-10 Scores Only   Global Mental Health Score 17 17 17 17    17 17    17    17 18 12    Global Physical Health Score 17 19 19 19    19 18    18    18 17 12    PROMIS TOTAL - SUBSCORES 34 36 36 36    36 35    35    35 35 24        Patient-reported     [place] Suicide Severity Rating Scale (Lifetime/Recent)      5/12/2022    10:07 PM 9/27/2022     2:16 PM 2/28/2024    10:11 AM   [place] Suicide Severity Rating (Lifetime/Recent)   1. Wish to be Dead (Lifetime) N Y Y   Wish to be Dead Description (Lifetime)  fleeting thought of wishing she were dead    1. Wish to be Dead (Past 1 Month)  N N   2. Non-Specific Active Suicidal Thoughts (Lifetime) N N N   Most Severe Ideation Rating (Lifetime)  1 1   Most Severe Ideation Rating (Past 1 Month)  1 1   Frequency (Lifetime)  1 1   Frequency (Past 1 Month)  1    Duration (Lifetime)  1 1   Duration (Past 1 Month)  1    Controllability (Lifetime)  1    Controllability (Past 1 Month)  1    Deterrents (Lifetime)  1    Deterrents (Past 1 Month)  1    Reasons for Ideation (Lifetime)  4 5   Reasons for Ideation (Past 1 Month)  4 0   Actual Attempt (Lifetime) N N N   Has subject engaged in non-suicidal  self-injurious behavior? (Lifetime) N N N   Interrupted Attempts (Lifetime) N N N   Aborted or Self-Interrupted Attempt (Lifetime) N N N   Preparatory Acts or Behavior (Lifetime) N N N   Calculated C-SSRS Risk Score (Lifetime/Recent) No Risk Indicated No Risk Indicated No Risk Indicated         ASSESSMENT: Current Emotional / Mental Status (status of significant symptoms):   Risk status (Self / Other harm or suicidal ideation)   Patient denies current fears or concerns for personal safety.   Patient denies current or recent suicidal ideation or behaviors.   Patient denies current or recent homicidal ideation or behaviors.   Patient denies current or recent self injurious behavior or ideation.   Patient denies other safety concerns.   Patient reports there has been no change in risk factors since their last session.     Patient reports there has been no change in protective factors since their last session.     Recommended that patient call 911 or go to the local ED should there be a change in any of these risk factors     Appearance:   Appropriate    Eye Contact:   Good    Psychomotor Behavior: Normal  Restless    Attitude:   Cooperative    Orientation:   All   Speech    Rate / Production: Impoverished  Normal     Volume:  Normal    Mood:    Anxious  Normal   Affect:    Appropriate    Thought Content:  Clear    Thought Form:  Coherent  Logical  Circumstantial   Insight:    Fair      Medication Review:   No changes to current psychiatric medication(s) move off Venlafaxine complete     Medication Compliance:   Yes     Changes in Health Issues:   None reported     Chemical Use Review:   Substance Use: Chemical use reviewed, no active concerns identified      Tobacco Use: No current tobacco use.      Diagnosis:  1. Generalized anxiety disorder      Collateral Reports Completed:   Not Applicable    PLAN: (Patient Tasks / Therapist Tasks / Other)  Continue  mindfulness grounding practices, GLAD gratitude, sleep hygiene,  notice anxiety triggers, leaves on a stream, little or big deal.     Mica Tang, JOESPH  3/3/25                                                            Individual Treatment Plan    Patient's Name: Jolynn Haji  YOB: 1955    Date of Creation: 3/325  Date Treatment Plan Last Reviewed/Revised:     DSM5 Diagnoses: 300.02 (F41.1) Generalized Anxiety Disorder  Psychosocial / Contextual Factors: aging, panic  PROMIS (reviewed every 90 days): see above    Referral / Collaboration:  Referral to another professional/service is not indicated at this time..    Anticipated number of session for this episode of care:  tbd  Anticipation frequency of session: Every other week  Anticipated Duration of each session: 38-52 minutes  Treatment plan will be reviewed in 90 days or when goals have been changed.       MeasurableTreatment Goal(s) related to diagnosis / functional impairment(s)  Goal 1: Patient will learn and practice skills to address anxiety    I will know I've met my goal when tbc.      Objective #A (Patient Action)    Patient will  : .  Sleep hygiene  Grounding skills, mindfulness  Gratitude practice    Status: New - Date: 3/3/25      Intervention(s)  Therapist will teach  sleep hygiene, grounding skills, mindfulness, gratitude practices and assign homework .      Patient has reviewed and agreed to the above plan.      Mica Tang, [facility]  March 3, 2025                                           ______________________________________________________________________

## 2025-03-04 ENCOUNTER — OFFICE VISIT (OUTPATIENT)
Dept: DERMATOLOGY | Facility: CLINIC | Age: 70
End: 2025-03-04
Payer: COMMERCIAL

## 2025-03-04 DIAGNOSIS — Z85.828 HISTORY OF SCC (SQUAMOUS CELL CARCINOMA) OF SKIN: ICD-10-CM

## 2025-03-04 DIAGNOSIS — D22.9 NEVUS: Primary | ICD-10-CM

## 2025-03-04 DIAGNOSIS — D48.5 NEOPLASM OF UNCERTAIN BEHAVIOR OF SKIN: ICD-10-CM

## 2025-03-04 DIAGNOSIS — L81.4 LENTIGO: ICD-10-CM

## 2025-03-04 DIAGNOSIS — L82.1 SEBORRHEIC KERATOSIS: ICD-10-CM

## 2025-03-04 DIAGNOSIS — D18.01 ANGIOMA OF SKIN: ICD-10-CM

## 2025-03-04 DIAGNOSIS — Z85.828 HISTORY OF BASAL CELL CARCINOMA (BCC): ICD-10-CM

## 2025-03-04 PROCEDURE — 11102 TANGNTL BX SKIN SINGLE LES: CPT | Performed by: PHYSICIAN ASSISTANT

## 2025-03-04 PROCEDURE — 99213 OFFICE O/P EST LOW 20 MIN: CPT | Mod: 25 | Performed by: PHYSICIAN ASSISTANT

## 2025-03-04 PROCEDURE — 11103 TANGNTL BX SKIN EA SEP/ADDL: CPT | Performed by: PHYSICIAN ASSISTANT

## 2025-03-04 NOTE — PROGRESS NOTES
HPI:   Chief complaints: Jolynn Haji is a 69 year old female who presents for Full skin cancer screening to rule out skin cancer   Last Skin Exam: 1 year ago     1st Baseline: no  Personal HX of Skin Cancer: yes multiple BCC and SCC    Personal HX of Malignant Melanoma: no   Family HX of Skin Cancer / Malignant Melanoma: no  Personal HX of Atypical Moles:   no  Risk factors: history of sun exposure and burns; history of blistering burns in the past; history of tanning bed use in the past  New / Changing lesions: None  Social History: has 2 children and 2 grandchildren; getting ready to open cabin   On review of systems, there are no further skin complaints, patient is feeling otherwise well.  See patient intake sheet.  ROS of the following were done and are negative: Constitutional, Eyes, Ears, Nose,   Mouth, Throat, Cardiovascular, Respiratory, GI, Genitourinary, Musculoskeletal,   Psychiatric, Endocrine, Allergic/Immunologic.    PHYSICAL EXAM:   LMP 06/28/2001   Skin exam performed as follows: Type 2 skin. Mood appropriate  Alert and Oriented X 3. Well developed, well nourished in no distress.  General appearance: Normal  Head including face: Normal  Eyes: conjunctiva and lids: Normal  Mouth: Lips, teeth, gums: Normal  Neck: Normal  Chest-breast/axillae: Normal  Back: Normal  Spleen and liver: Normal  Cardiovascular: Exam of peripheral vascular system by observation for swelling, varicosities, edema: Normal  Genitalia: groin, buttocks: Normal  Extremities: digits/nails (clubbing): Normal  Eccrine and Apocrine glands: Normal  Right upper extremity: Normal  Left upper extremity: Normal  Right lower extremity: Normal  Left lower extremity: Normal  Skin: Scalp and body hair: See below    Pt deferred exam of breasts, groin, buttocks: No    Other physical findings:  1. Multiple pigmented macules on extremities and trunk  2. Multiple pigmented macules on face, trunk and extremities  3. Multiple vascular papules on trunk,  arms and legs  4. Multiple scattered keratotic plaques  5. 6 mm pink scaly papule on the left forearm  6. 4 mm pink macule on the left upper arm  7. 3 mm pearly papule on the left ala       Except as noted above, no other signs of skin cancer or melanoma.     ASSESSMENT/PLAN:   Benign Full skin cancer screening today. . Patient with history of multiple BCC, SCC  Advised on monthly self exams and 1 year  Patient Education: Appropriate brochures given.    Multiple benign appearing nevi on arms, legs and trunk. Discussed ABCDEs of melanoma and sunscreen.   Multiple lentigos on arms, legs and trunk. Advised benign, no treatment needed.  Multiple scattered angiomas. Advised benign, no treatment needed.   Seborrheic keratosis on arms, legs and trunk. Advised benign, no treatment needed.  R/o BCC on the left forearm, left upper arm, left ala. Shave biopsy performed.  Area cleaned and anesthetized with 1% lidocaine with epinephrine.  Dermablade used to remove the lesion and sent to pathology. Bleeding was cauterized. Pt tolerated procedure well with no complications.               Follow-up: yearly FSE/PRN sooner    1.) Patient was asked about new and changing moles. YES  2.) Patient received a complete physical skin examination: YES  3.) Patient was counseled to perform a monthly self skin examination: YES  Scribed By: Ligia Carbajal MS, PALEIGH ANN

## 2025-03-04 NOTE — LETTER
3/4/2025      Jolynn Haji  [address] [place] [address]      Dear Colleague,    Thank you for referring your patient, Jolynn Haji, to the [facility]. Please see a copy of my visit note below.    HPI:   Chief complaints: Jolynn Haji is a 69 year old female who presents for Full skin cancer screening to rule out skin cancer   Last Skin Exam: 1 year ago     1st Baseline: no  Personal HX of Skin Cancer: yes multiple BCC and SCC    Personal HX of Malignant Melanoma: no   Family HX of Skin Cancer / Malignant Melanoma: no  Personal HX of Atypical Moles:   no  Risk factors: history of sun exposure and burns; history of blistering burns in the past; history of tanning bed use in the past  New / Changing lesions: None  Social History: has 2 children and 2 grandchildren; getting ready to open cabin   On review of systems, there are no further skin complaints, patient is feeling otherwise well.  See patient intake sheet.  ROS of the following were done and are negative: Constitutional, Eyes, Ears, Nose,   Mouth, Throat, Cardiovascular, Respiratory, GI, Genitourinary, Musculoskeletal,   Psychiatric, Endocrine, Allergic/Immunologic.    PHYSICAL EXAM:   LMP 06/28/2001   Skin exam performed as follows: Type 2 skin. Mood appropriate  Alert and Oriented X 3. Well developed, well nourished in no distress.  General appearance: Normal  Head including face: Normal  Eyes: conjunctiva and lids: Normal  Mouth: Lips, teeth, gums: Normal  Neck: Normal  Chest-breast/axillae: Normal  Back: Normal  Spleen and liver: Normal  Cardiovascular: Exam of peripheral vascular system by observation for swelling, varicosities, edema: Normal  Genitalia: groin, buttocks: Normal  Extremities: digits/nails (clubbing): Normal  Eccrine and Apocrine glands: Normal  Right upper extremity: Normal  Left upper extremity: Normal  Right lower extremity: Normal  Left lower extremity: Normal  Skin: Scalp and body hair: See  below    Pt deferred exam of breasts, groin, buttocks: No    Other physical findings:  1. Multiple pigmented macules on extremities and trunk  2. Multiple pigmented macules on face, trunk and extremities  3. Multiple vascular papules on trunk, arms and legs  4. Multiple scattered keratotic plaques  5. 6 mm pink scaly papule on the left forearm  6. 4 mm pink macule on the left upper arm  7. 3 mm pearly papule on the left ala       Except as noted above, no other signs of skin cancer or melanoma.     ASSESSMENT/PLAN:   Benign Full skin cancer screening today. . Patient with history of multiple BCC, SCC  Advised on monthly self exams and 1 year  Patient Education: Appropriate brochures given.    Multiple benign appearing nevi on arms, legs and trunk. Discussed ABCDEs of melanoma and sunscreen.   Multiple lentigos on arms, legs and trunk. Advised benign, no treatment needed.  Multiple scattered angiomas. Advised benign, no treatment needed.   Seborrheic keratosis on arms, legs and trunk. Advised benign, no treatment needed.  R/o BCC on the left forearm, left upper arm, left ala. Shave biopsy performed.  Area cleaned and anesthetized with 1% lidocaine with epinephrine.  Dermablade used to remove the lesion and sent to pathology. Bleeding was cauterized. Pt tolerated procedure well with no complications.               Follow-up: yearly FSE/PRN sooner    1.) Patient was asked about new and changing moles. YES  2.) Patient received a complete physical skin examination: YES  3.) Patient was counseled to perform a monthly self skin examination: YES  Scribed By: Ligia Carbajal, MS, PAPawelC        Again, thank you for allowing me to participate in the care of your patient.        Sincerely,        Ligia Carbajal PA-C    Electronically signed

## 2025-03-04 NOTE — PATIENT INSTRUCTIONS
Wound Care Instructions     FOR SUPERFICIAL WOUNDS     [facility]  [phone]                 AFTER 24 HOURS YOU SHOULD REMOVE THE BANDAGE AND BEGIN DAILY DRESSING CHANGES AS FOLLOWS:     1) Remove Dressing.     2) Clean and dry the area with tap water using a Q-tip or sterile gauze pad.     3) Apply Vaseline, Aquaphor, Polysporin ointment or Bacitracin ointment over entire wound.  Do NOT use Neosporin ointment.     4) Cover the wound with a band-aid, or a sterile non-stick gauze pad and micropore paper tape    REPEAT THESE INSTRUCTIONS AT LEAST ONCE A DAY UNTIL THE WOUND HAS COMPLETELY HEALED.    It is an old wives tale that a wound heals better when it is exposed to air and allowed to dry out. The wound will heal faster with a better cosmetic result if it is kept moist with ointment and covered with a bandage.    **Do not let the wound dry out.**    Supplies Needed:      *Cotton tipped applicators (Q-tips)    *Vaseline, Aquaphor, Polysporin or Bacitracin Ointment (NOT NEOSPORIN)    *Band-aids or non-stick gauze pads and micropore paper tape.      PATIENT INFORMATION:    During the healing process you will notice a number of changes. All wounds develop a small halo of redness surrounding the wound.  This means healing is occurring. Severe itching with extensive redness usually indicates sensitivity to the ointment or bandage tape used to dress the wound.  You should call our office if this develops.      Swelling  and/or discoloration around your surgical site is common, particularly when performed around the eye.    All wounds normally drain.  The larger the wound the more drainage there will be.  After 7-10 days, you will notice the wound beginning to shrink and new skin will begin to grow.  The wound is healed when you can see skin has formed over the entire area.  A healed wound has a healthy, shiny look to the surface and is red to dark pink in color to normalize.  Wounds may  take approximately 4-6 weeks to heal.  Larger wounds may take 6-8 weeks.  After the wound is healed you may discontinue dressing changes.    You may experience a sensation of tightness as your wound heals. This is normal and will gradually subside.    Your healed wound may be sensitive to temperature changes. This sensitivity improves with time, but if you re having a lot of discomfort, try to avoid temperature extremes.    Patients frequently experience itching after their wound appears to have healed because of the continue healing under the skin.  Plain Vaseline will help relieve the itching.      POSSIBLE COMPLICATIONS    BLEEDING:    Leave the bandage in place.  Use tightly rolled up gauze or a cloth to apply direct pressure over the bandage for 30  minutes.  Reapply pressure for an additional 30 minutes if necessary  Use additional gauze and tape to maintain pressure once the bleeding has stopped.

## 2025-03-05 ENCOUNTER — OFFICE VISIT (OUTPATIENT)
Dept: FAMILY MEDICINE | Facility: CLINIC | Age: 70
End: 2025-03-05
Payer: COMMERCIAL

## 2025-03-05 VITALS
WEIGHT: 133 LBS | DIASTOLIC BLOOD PRESSURE: 74 MMHG | HEIGHT: 69 IN | HEART RATE: 70 BPM | BODY MASS INDEX: 19.7 KG/M2 | RESPIRATION RATE: 16 BRPM | SYSTOLIC BLOOD PRESSURE: 128 MMHG | TEMPERATURE: 97.6 F | OXYGEN SATURATION: 97 %

## 2025-03-05 DIAGNOSIS — I10 ESSENTIAL HYPERTENSION WITH GOAL BLOOD PRESSURE LESS THAN 140/90: ICD-10-CM

## 2025-03-05 DIAGNOSIS — M17.12 PRIMARY LOCALIZED OSTEOARTHROSIS OF LEFT LOWER LEG: ICD-10-CM

## 2025-03-05 DIAGNOSIS — R41.843: ICD-10-CM

## 2025-03-05 DIAGNOSIS — F43.23 ADJUSTMENT DISORDER WITH MIXED ANXIETY AND DEPRESSED MOOD: Primary | ICD-10-CM

## 2025-03-05 DIAGNOSIS — I95.1 ORTHOSTATIC HYPOTENSION: ICD-10-CM

## 2025-03-05 DIAGNOSIS — F51.02 ADJUSTMENT INSOMNIA: ICD-10-CM

## 2025-03-05 DIAGNOSIS — F41.1 GAD (GENERALIZED ANXIETY DISORDER): ICD-10-CM

## 2025-03-05 RX ORDER — TRIAMCINOLONE ACETONIDE 40 MG/ML
40 INJECTION, SUSPENSION INTRA-ARTICULAR; INTRAMUSCULAR ONCE
Status: COMPLETED | OUTPATIENT
Start: 2025-03-05 | End: 2025-03-05

## 2025-03-05 RX ADMIN — TRIAMCINOLONE ACETONIDE 40 MG: 40 INJECTION, SUSPENSION INTRA-ARTICULAR; INTRAMUSCULAR at 17:00

## 2025-03-05 NOTE — PROGRESS NOTES
[facility]  [address] [place] [address]  Office: [phone]   Fax:    [phone]       Assessment & Plan :       ICD-10-CM    1. Adjustment disorder with mixed anxiety and depressed mood- mild  at this time  F43.23       2. TIFFANY (generalized anxiety disorder)  F41.1       3. 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 - much improved -mostly  resolved as of 5/5/2023 - slight presence noted 3/5/2025  R41.843       4. Primary localized osteoarthrosis of left lower leg  M17.12 triamcinolone (KENALOG-40) injection 40 mg      5. Essential hypertension with goal blood pressure less than 140/90 - then Orthostatic hypotension- resolved off of lisinopril 2.5mg daily and doing daily 2.25 mile walks with her dog  I10       6. Adjustment insomnia - resolved as of 5/5/2023 - off of ambien with psychiatry's okay   F51.02       7. Orthostatic hypotension- discussed maintaining hydration to help maintain blood pressure  I95.1             Don't take your blood pressure medication unless your BP is > 135/85.    Increase your hydration - caffeine-free liquids - 4-6 eight oz glasses of water daily = try for a minimum of 48 oz. To keep your blood pressure and prevent orthostatic hypotension ( too low a BP when standing = dizziness.).      Assessment & Plan  Primary localized osteoarthrosis of left lower leg  - Osteoarthritis in the left knee with previous steroid injections. Mild spurring noted on the left distal medial femoral condyle.  - Administer triamcinolone injection to the left knee. Follow-up appointment scheduled for April 15, 2025.  - Risks and side effects: Discussed potential risks of infection, bleeding, bruising, increased pain post-injection, and ligament or tendon rupture.    Essential hypertension with goal blood pressure less than 140/90  - Blood pressure readings at home are significantly lower than in-office readings,  "indicating possible white coat hypertension.  - Continue monitoring blood pressure at home. Do not take lisinopril unless blood pressure is greater than 135/85. Increase hydration with caffeine-free fluids.    Adjustment disorder with mixed anxiety and depressed mood  - Mood has been up and down due to recent family deaths. Current medication, sertraline 100 mg daily, is helping but not fully effective yet.  - Continue with current medication regimen. Follow-up with Dr. Pompa in April.    Orthostatic hypotension  - Noted low blood pressure readings at home, causing dizziness.  - Increase hydration to prevent orthostatic hypotension. Monitor for symptoms.    The longitudinal plan of care for the diagnosis(es)/condition(s) as documented were addressed during this visit. Due to the added complexity in care, I will continue to support Azalea in the subsequent management and with ongoing continuity of care.    \"Consent was obtained from the patient to use an AI scribe/documentation tool in the creation of this note.This note/dictation was performed and completed with the assistance of voice recognition software and an AI scribe. It may contain inadvertant transcription  errors,  omissions and/or  inadvertent word substitution.\" --Helga Ibarra MD        MEDICATIONS:   Orders Placed This Encounter   Medications    triamcinolone (KENALOG-40) injection 40 mg          - Continue other medications without change  Regular exercise  See Patient Instructions         Subjective   Azalea is a 69 year old, presenting for the following health issues:  Recheck Medication  and the following other medical problems:      1. Adjustment disorder with mixed anxiety and depressed mood- mild  at this time    2. TIFFANY (generalized anxiety disorder)    3. 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 - much improved -mostly  resolved as of 5/5/2023 - slight presence noted 3/5/2025  "   4. Primary localized osteoarthrosis of left lower leg    5. Essential hypertension with goal blood pressure less than 140/90 - then Orthostatic hypotension- resolved off of lisinopril 2.5mg daily and doing daily 2.25 mile walks with her dog    6. Adjustment insomnia - resolved as of 5/5/2023 - off of ambien with psychiatry's okay     7. Orthostatic hypotension- discussed maintaining hydration to help maintain blood pressure            3/5/2025     4:20 PM   Additional Questions   Roomed by Bruna DIANE CMA     History of Present Illness       Mental Health Follow-up:  Patient presents to follow-up on Depression & Anxiety.Patient's depression since last visit has been:  Medium  The patient is not having other symptoms associated with depression.  Patient's anxiety since last visit has been:  Medium  The patient is having other symptoms associated with anxiety.  Any significant life events: No  Patient is feeling anxious or having panic attacks.  Patient has no concerns about alcohol or drug use.    She eats 2-3 servings of fruits and vegetables daily.She consumes 0 sweetened beverage(s) daily.She exercises with enough effort to increase her heart rate 10 to 19 minutes per day.  She exercises with enough effort to increase her heart rate 3 or less days per week.   She is taking medications regularly.   Azalea Haji, 69 years    Osteoarthritis in Knees  Azalea has osteoarthritis in her knees, with the left knee causing more pain. She had a steroid injection in the left knee on August 14, 2024, which helped a lot. She is trying to walk more even though it hurts, describing the pain as a dull ache.    Basal Cell Skin Cancer  Azalea had basal cell skin cancer on her right leg. She had a skin check at the cancer clinic, and a biopsy was done on a spot in the fold of her nose the day before this visit.    Mood and Depression  Azalea's mood has been up and down due to family issues, including the deaths of relatives her age. She  takes sertraline 100 mg daily, which helps her think more clearly than her previous medication, venlafaxine. She last saw her psychiatrist, Dr. Pompa, on February 21, 2025, and is waiting for possible changes to her medication.    Energy Levels and Fatigue  Azalea feels tired, especially after shoveling heavy, wet snow. Her energy levels go up and down, and she feels worn out after physical activity.    Blood Pressure Concerns  Azalea checks her blood pressure at home, and it varies a lot. On February 21, 2025, it was very low at 72/43, making her dizzy. She hasn't taken lisinopril since deciding to use it only when her blood pressure is over 135/85.    Sensory Overload  Azalea felt overwhelmed by the noise and activity at a recent third-grade basketball tournament she attended for her grandson, Lb.    Therapy and Mental Health Support  Azalea is seeing a new therapist, Mica, after her previous therapist, Erika, moved away. She is getting used to Mica but likes her sense of humor.    Headaches  Azalea has frequent headaches, so her psychiatrist ordered a CT scan of her head to check for any issues.      1/28/2025     3:51 PM 2/18/2025     3:52 PM 2/28/2025     8:30 PM   TIFFANY-7 SCORE   Total Score 5 (mild anxiety) 7 (mild anxiety) 7 (mild anxiety)   Total Score 5  7  7        Patient-reported            1/8/2025    11:26 AM 2/5/2025    11:26 AM 3/1/2025     4:54 PM   PHQ   PHQ-9 Total Score 11  7  6    Q9: Thoughts of better off dead/self-harm past 2 weeks Several days Not at all Not at all   F/U: Thoughts of suicide or self-harm No     F/U: Safety concerns No         Patient-reported      Hypertension Follow-up:     Do you check your blood pressure regularly outside of the clinic? Yes   Are you following a low salt diet? Yes  Are your blood pressures ever more than 140 on the top number (systolic) OR more   than 90 on the bottom number (diastolic), for example 140/90? Yes        Patient Active Problem List    Diagnosis    Essential hypertension with goal blood pressure less than 140/90 - then Orthostatic hypotension- resolved off of lisinopril 2.5mg daily and doing daily 2.25 mile walks with her dog    Hidradenitis    Localized osteoarthritis of hand    Synovial cyst of popliteal space    DERMATOPHYTOSIS OF NAIL- toenails     Lipidosis    Osteopenia    Postmenopausal    NUMBNESS AND TINGLING OF FOOT - bilateral -mild     Numbness of feet- distal feet R>L     Stress incontinence, female - mild    Cough    Acute bronchitis- recurrent- ? related to mold in school building- pt has since retired and no further bronchitis    Rosacea    History of migraine headaches- assoc. with nausea/vomiting - resolved around menopause - were  premenstrual     Dupuytren's disease of palm - right 4th digit flexor tendon    Sun-damaged skin    Seasonal allergic rhinitis    Basal cell carcinoma of leg, right    Adjustment disorder with mixed anxiety and depressed mood- mild  at this time    Family history of celiac disease    TIFFANY (generalized anxiety disorder)    Insomnia, unspecified type    Chronic constipation    Raynaud's phenomenon without gangrene    Other secondary osteoarthritis of left knee    Primary localized osteoarthrosis of left lower leg    Hyperlipidemia LDL goal <130    Basal cell carcinoma    Colon polyps    Screening for cervical cancer    Adjustment insomnia - resolved as of 5/5/2023 - off of ambien with psychiatry's okay     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 - much improved -mostly  resolved as of 5/5/2023 - slight presence noted 3/5/2025    Drug-induced parkinsonism (H)- Abilify - generic = ARIPiprazole( 5mg) - RESOLVED OFF ABILIFY     Mood disorder    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 - lower pretibial area- has follow up with Dermatology 3/4/2025    Bilateral lower extremity edema- trace pitting edema  "distal LE to mid pretibial areas Left > right    Tremor    Major depressive disorder, recurrent, mild    Orthostatic hypotension- resolved off of lisinopril 2.5mg daily and doing daily 2.25 mile walks with her dog    Moderate recurrent major depression (H)       Current Outpatient Medications   Medication Sig Dispense Refill    clindamycin (CLEOCIN T) 1 % external lotion Apply topically 2 times daily. For acne flares 60 mL 3    lactobacillus rhamnosus, GG, (CULTURELL) capsule Take 1 capsule by mouth daily Taking every other day      lisinopril (ZESTRIL) 2.5 MG tablet Take 1 tablet (2.5 mg) by mouth daily as needed (BP >135/85). 90 tablet 3    propranolol ER (INDERAL LA) 60 MG 24 hr capsule TAKE ONE CAPSULE BY MOUTH ONCE DAILY 90 capsule 3    sertraline (ZOLOFT) 100 MG tablet Take 1 tablet (100 mg) by mouth daily. 90 tablet 0    traZODone (DESYREL) 50 MG tablet Take 0.5-1 tablets (25-50 mg) by mouth nightly as needed for sleep. 90 tablet 1          Allergies   Allergen Reactions    Abilify [Aripiprazole] Other (See Comments)     Suspected drug-induced Parkinsonism     Fosamax Diarrhea     Diarrhea, stomach cramps, jaw pain     Amoxicillin-Pot Clavulanate Itching and Rash    Amoxicillin-Pot Clavulanate Itching and Rash              Review of Systems  Constitutional, HEENT, cardiovascular, pulmonary, GI, , musculoskeletal, neuro, skin, endocrine and psych systems are negative, except as otherwise noted.      Objective    /74 (BP Location: Left arm, Patient Position: Sitting, Cuff Size: Adult Regular)   Pulse 70   Temp 97.6  F (36.4  C) (Tympanic)   Resp 16   Ht 1.765 m (5' 9.49\")   Wt 60.3 kg (133 lb)   LMP 06/28/2001   SpO2 97%   BMI 19.36 kg/m    Body mass index is 19.36 kg/m .  Physical Exam :   GENERAL: alert and no distress  EYES: Eyes grossly normal to inspection, PERRL and conjunctivae and sclerae normal  HENT: ear canals and TM's normal, nose and mouth without ulcers or lesions  NECK: no " adenopathy, no asymmetry, masses, or scars  RESP: lungs clear to auscultation - no rales, rhonchi or wheezes  CV: regular rate and rhythm, normal S1 S2, no S3 or S4, no murmur, click or rub, no peripheral edema  MS: no gross musculoskeletal defects noted, no edema  SKIN: no suspicious lesions or rashes  NEURO: Normal strength and tone, mentation intact and speech normal  PSYCH: mentation appears normal, affect normal/bright    Results    - Biopsy of three spots on the skin performed on March 4, 2025.  - CT scan of the head scheduled for March 6, 2025.  - Blood pressure readings at home:  - March 3, 2025: 95/56  - February 26, 2025: 91/54  - February 22, 2025: 101/57  - February 21, 2025: 72/43 at 9:20 PM  - Blood pressure reading in office:  - March 5, 2025: 160/94 initially, improved to 128/74 after rest  - X-rays from August 14, 2024, show mild spurring on the left distal medial femoral condyle and limited space behind the patella.       Knee joint injection:   After risks of bleeding, infection, ligament/tendon rupture, and loss of limb were fully discussed and questions answered, verbal and written informed consent was obtained and witnessed by Sarina OCHOA CMA.  Using sterile technique the knee was prepped with isopropyl alcohol and betadine for  a medial infrapatellar approach. The knee joint was flexed and Steroid kenalog 40  mg (400mg/10ml = 40mg/ml) = 1 ml and 4 ml  plain 1% Lidocaine was then injected via a medial infrapatellar approach.and the needle withdrawn and injection site dressed with a bandaid.     The procedure was well tolerated with little to no discomfort per patient report.  The patient is asked to continue to rest the knee for a few more days before resuming regular activities.  It may be more painful for the first 1-2 days.  Watch for fever, redness, or increased swelling or persistent pain in knee. Call or return to clinic prn if such symptoms occur or the knee fails to improve as anticipated.   Knee pain improved after injection.       See AVS.     Signed Electronically by: Helga Ibarra MD

## 2025-03-05 NOTE — PATIENT INSTRUCTIONS
[facility]  [address] [place] [id number]  Office: [phone]   Fax:    [phone]

## 2025-03-06 ENCOUNTER — HOSPITAL ENCOUNTER (OUTPATIENT)
Dept: CT IMAGING | Facility: CLINIC | Age: 70
Discharge: HOME | End: 2025-03-06
Attending: PSYCHIATRY & NEUROLOGY
Payer: COMMERCIAL

## 2025-03-06 DIAGNOSIS — R41.89 COGNITIVE CHANGES: ICD-10-CM

## 2025-03-06 DIAGNOSIS — R51.9 NONINTRACTABLE HEADACHE, UNSPECIFIED CHRONICITY PATTERN, UNSPECIFIED HEADACHE TYPE: ICD-10-CM

## 2025-03-06 DIAGNOSIS — R42 DIZZINESS: ICD-10-CM

## 2025-03-06 DIAGNOSIS — R25.1 TREMOR: ICD-10-CM

## 2025-03-06 PROCEDURE — 70450 CT HEAD/BRAIN W/O DYE: CPT

## 2025-03-10 ENCOUNTER — TELEPHONE (OUTPATIENT)
Dept: DERMATOLOGY | Facility: CLINIC | Age: 70
End: 2025-03-10
Payer: COMMERCIAL

## 2025-03-10 ENCOUNTER — MYC MEDICAL ADVICE (OUTPATIENT)
Dept: PSYCHIATRY | Facility: CLINIC | Age: 70
End: 2025-03-10
Payer: COMMERCIAL

## 2025-03-10 DIAGNOSIS — D48.5 NEOPLASM OF UNCERTAIN BEHAVIOR OF SKIN: Primary | ICD-10-CM

## 2025-03-10 NOTE — TELEPHONE ENCOUNTER
----- Message from Ligia Garcia sent at 3/10/2025 10:10 AM CDT -----  Left forearm SCCIS, left ala BCC please schedule excision   Left upper arm superficial BCC option for imiquimod M-F x 6 weeks vs excision

## 2025-03-10 NOTE — TELEPHONE ENCOUNTER
Called patient:  Patient notified and educated on test results   Mohs procedure explained- all questions answered  appointment scheduled- mohs packet mailed.     Thank you,  Linda ROGERS RN  Dermatology   [phone]

## 2025-03-10 NOTE — LETTER
[facility]  [address]  [address]  [phone]        3/10/2025       Jolynn Haji  [address]  [place] [address]      Dear Jolynn:    You are scheduled for Mohs Surgery on: 4/24/25 at 9:00 am, 5/1/24 at 9:00 am and 5/22/25 at 8:30 am.    Please check in at [facility], [address].     You don't need to arrive more than 5-10 minutes prior to your appointment time.     Be sure to eat a good breakfast and bathe and wash your hair prior to surgery.     If you are taking any anti-coagulants that are prescribed by your Doctor (such as Coumadin/Warfarin, Plavix, Aspirin, Ibuprofen), please continue taking them.     However, if you are taking anti-coagulants over the counter without a Doctor's order for a medical condition, please discontinue them 10 days prior to surgery.     Please wear loose comfortable clothing as it could possibly be 4-6 hours until your surgery is completed depending upon how many layers of tissue need to be removed.      Please bring  with you if this is above your neck.     If you need any mobility assistance (getting on the exam chair or toilet) please bring a caregiver, family member, or staff member to assist you. We are not equipped to transfer patients.      Thank you,    JASBIR Rogers MD

## 2025-03-24 ENCOUNTER — MYC MEDICAL ADVICE (OUTPATIENT)
Dept: PSYCHIATRY | Facility: CLINIC | Age: 70
End: 2025-03-24

## 2025-03-24 ENCOUNTER — VIRTUAL VISIT (OUTPATIENT)
Facility: CLINIC | Age: 70
End: 2025-03-24
Payer: COMMERCIAL

## 2025-03-24 DIAGNOSIS — F41.1 GENERALIZED ANXIETY DISORDER: Primary | ICD-10-CM

## 2025-03-24 DIAGNOSIS — G47.00 INSOMNIA, UNSPECIFIED TYPE: ICD-10-CM

## 2025-03-24 PROCEDURE — 90837 PSYTX W PT 60 MINUTES: CPT | Mod: 95

## 2025-03-24 NOTE — PROGRESS NOTES
[facility]                                     Progress Note    Patient Name: Jolynn Haji  Date: 3/24/2025         Service Type: Individual      Session Start Time: 1:00 pm  Session End Time: 1:53 pm     Session Length: 53 min    Session #: 6 (previous therapy with Erika RAIN)    Attendees: Client attended alone    Service Modality:  Video Visit:      Provider verified identity through the following two step process.  Patient provided:  Patient address    Telemedicine Visit: The patient's condition can be safely assessed and treated via synchronous audio and visual telemedicine encounter.      Reason for Telemedicine Visit: Patient convenience (e.g. access to timely appointments / distance to available provider)    Originating Site (Patient Location): Patient's home    Distant Site (Provider Location): Provider Remote Setting- Home Office    Consent:  The patient/guardian has verbally consented to: the potential risks and benefits of telemedicine (video visit) versus in person care; bill my insurance or make self-payment for services provided; and responsibility for payment of non-covered services.     Patient would like the video invitation sent by:  My Chart    Mode of [facility]:  [facility]    Distant Location (Provider):  Off-site    As the provider I attest to compliance with applicable laws and regulations related to telemedicine.    DATA  Interactive Complexity: No  Crisis: No      Progress Since Last Session (Related to Symptoms / Goals / Homework):   Symptoms: No change anxiety    Homework: Achieved / completed to satisfaction      Episode of Care Goals: Satisfactory progress - ACTION (Actively working towards change); Intervened by reinforcing change plan / affirming steps taken     Current / Ongoing Stressors and Concerns:   Off Venlafaxene. Current functioning, cognitive questions, overwhelm. Tired of feeling anxious     Treatment Objective(s) Addressed in This  Session:   Sleep hygiene  Grounding skills, mindfulness  Gratitude practice  Anxiety triggers     Intervention:  Interpersonal Therapy: provided active listening and validation of experience with stressors. Utilized rapport building, reinforced grounding skills, sleep hygiene strategies, GLAD gratitude practice , Leaves on a Stream mindfulness practice.   DBT: Radical acceptance and Check the Facts psychoeducation.    Assessments completed prior to visit:  The following assessments were completed by patient for this visit:  PHQ2:       5/3/2023     1:18 PM 3/21/2023    11:20 AM 2/23/2023     7:15 PM 1/31/2023     3:21 PM 1/12/2023     4:28 PM 12/15/2022     9:12 PM 12/8/2022     9:38 AM   PHQ-2 ( 1999 Pfizer)   Q1: Little interest or pleasure in doing things   0 0 0  0   Q2: Feeling down, depressed or hopeless   0 0 0  0   PHQ-2 Score   0 0 0  0   Q1: Little interest or pleasure in doing things   Not at all Not at all Not at all  Not at all   Q2: Feeling down, depressed or hopeless   Not at all Not at all Not at all  Not at all   PHQ-2 Score Incomplete Incomplete 0 0 0 Incomplete 0     PHQ9:       12/10/2024     5:27 PM 12/12/2024     1:09 PM 12/17/2024     2:23 PM 1/1/2025    11:51 AM 1/8/2025    11:26 AM 2/5/2025    11:26 AM 3/1/2025     4:54 PM   PHQ-9 SCORE   PHQ-9 Total Score MyChart 2 (Minimal depression)  10 (Moderate depression) 8 (Mild depression) 11 (Moderate depression) 7 (Mild depression) 6 (Mild depression)   PHQ-9 Total Score 2  12 10  8  11  7  6        Patient-reported     GAD2:       10/8/2024     4:08 PM 11/18/2024     9:17 AM 12/2/2024     5:19 PM 12/28/2024     3:34 PM 1/28/2025     3:51 PM 2/18/2025     3:52 PM 3/2/2025    12:35 PM   TIFFANY-2   Feeling nervous, anxious, or on edge 1 1 2 3 3 2 3   Not being able to stop or control worrying 0 0 1 2 1 2 1   TIFFANY-2 Total Score 1 1  3  5  4  4  4        Patient-reported     GAD7:       12/12/2024     1:09 PM 12/17/2024     2:25 PM 1/6/2025     8:49 AM  1/8/2025    11:28 AM 1/28/2025     3:51 PM 2/18/2025     3:52 PM 2/28/2025     8:30 PM   TIFFANY-7 SCORE   Total Score  8 (mild anxiety) 9 (mild anxiety) 6 (mild anxiety) 5 (mild anxiety) 7 (mild anxiety) 7 (mild anxiety)   Total Score 5 8  9  6  5  7  7        Patient-reported     CAGE-AID:       6/6/2017    12:05 PM 5/12/2022     2:26 PM   CAGE-AID Total Score   Total Score  0    0   Total Score MyChart  0 (A total score of 2 or greater is considered clinically significant)       Information is confidential and restricted. Go to Review Flowsheets to unlock data.     PROMIS 10-Global Health (only subscores and total score):       2/25/2024     2:04 PM 5/27/2024     7:01 PM 6/9/2024     8:48 PM 6/23/2024     8:01 PM 9/23/2024     9:33 AM 10/8/2024     4:11 PM 12/28/2024     3:39 PM   PROMIS-10 Scores Only   Global Mental Health Score 17 17 17 17    17 17    17    17 18 12    Global Physical Health Score 17 19 19 19    19 18    18    18 17 12    PROMIS TOTAL - SUBSCORES 34 36 36 36    36 35    35    35 35 24        Patient-reported     [place] Suicide Severity Rating Scale (Lifetime/Recent)      5/12/2022    10:07 PM 9/27/2022     2:16 PM 2/28/2024    10:11 AM   [place] Suicide Severity Rating (Lifetime/Recent)   1. Wish to be Dead (Lifetime) N Y Y   Wish to be Dead Description (Lifetime)  fleeting thought of wishing she were dead    1. Wish to be Dead (Past 1 Month)  N N   2. Non-Specific Active Suicidal Thoughts (Lifetime) N N N   Most Severe Ideation Rating (Lifetime)  1 1   Most Severe Ideation Rating (Past 1 Month)  1 1   Frequency (Lifetime)  1 1   Frequency (Past 1 Month)  1    Duration (Lifetime)  1 1   Duration (Past 1 Month)  1    Controllability (Lifetime)  1    Controllability (Past 1 Month)  1    Deterrents (Lifetime)  1    Deterrents (Past 1 Month)  1    Reasons for Ideation (Lifetime)  4 5   Reasons for Ideation (Past 1 Month)  4 0   Actual Attempt (Lifetime) N N N   Has subject engaged in non-suicidal  self-injurious behavior? (Lifetime) N N N   Interrupted Attempts (Lifetime) N N N   Aborted or Self-Interrupted Attempt (Lifetime) N N N   Preparatory Acts or Behavior (Lifetime) N N N   Calculated C-SSRS Risk Score (Lifetime/Recent) No Risk Indicated No Risk Indicated No Risk Indicated         ASSESSMENT: Current Emotional / Mental Status (status of significant symptoms):   Risk status (Self / Other harm or suicidal ideation)   Patient denies current fears or concerns for personal safety.   Patient denies current or recent suicidal ideation or behaviors.   Patient denies current or recent homicidal ideation or behaviors.   Patient denies current or recent self injurious behavior or ideation.   Patient denies other safety concerns.   Patient reports there has been no change in risk factors since their last session.     Patient reports there has been no change in protective factors since their last session.     Recommended that patient call 911 or go to the local ED should there be a change in any of these risk factors     Appearance:   Appropriate    Eye Contact:   Good    Psychomotor Behavior: Normal  Restless    Attitude:   Cooperative    Orientation:   All   Speech    Rate / Production: Impoverished  Normal     Volume:  Normal    Mood:    Anxious  Normal   Affect:    Appropriate    Thought Content:  Clear    Thought Form:  Coherent  Logical  Circumstantial   Insight:    Fair      Medication Review:   No changes to current psychiatric medication(s) move off Venlafaxine complete     Medication Compliance:   Yes     Changes in Health Issues:   None reported     Chemical Use Review:   Substance Use: Chemical use reviewed, no active concerns identified      Tobacco Use: No current tobacco use.      Diagnosis:  1. Generalized anxiety disorder    2. Insomnia, unspecified type      Collateral Reports Completed:   Not Applicable    PLAN: (Patient Tasks / Therapist Tasks / Other)  Continue  mindfulness grounding practices,  GLAD gratitude, sleep hygiene, notice anxiety triggers, leaves on a stream, little or big deal. Check the facts, radical acceptance    DINESH Campbell  3/24/25                                                            Individual Treatment Plan    Patient's Name: Jolynn Haji  YOB: 1955    Date of Creation: 3/325  Date Treatment Plan Last Reviewed/Revised:     DSM5 Diagnoses: 300.02 (F41.1) Generalized Anxiety Disorder  Psychosocial / Contextual Factors: aging, panic  PROMIS (reviewed every 90 days): see above    Referral / Collaboration:  Referral to another professional/service is not indicated at this time..    Anticipated number of session for this episode of care:  tbd  Anticipation frequency of session: Every other week  Anticipated Duration of each session: 38-52 minutes  Treatment plan will be reviewed in 90 days or when goals have been changed.       MeasurableTreatment Goal(s) related to diagnosis / functional impairment(s)  Goal 1: Patient will learn and practice skills to address anxiety    I will know I've met my goal when tbc.      Objective #A (Patient Action)    Patient will  : .  Sleep hygiene  Grounding skills, mindfulness  Gratitude practice    Status: New - Date: 3/3/25      Intervention(s)  Therapist will teach  sleep hygiene, grounding skills, mindfulness, gratitude practices and assign homework .      Patient has reviewed and agreed to the above plan.      DINESH Campbell  March 3, 2025                                           ______________________________________________________________________

## 2025-04-06 ASSESSMENT — ANXIETY QUESTIONNAIRES
6. BECOMING EASILY ANNOYED OR IRRITABLE: NOT AT ALL
GAD7 TOTAL SCORE: 6
2. NOT BEING ABLE TO STOP OR CONTROL WORRYING: SEVERAL DAYS
GAD7 TOTAL SCORE: 6
7. FEELING AFRAID AS IF SOMETHING AWFUL MIGHT HAPPEN: SEVERAL DAYS
5. BEING SO RESTLESS THAT IT IS HARD TO SIT STILL: NOT AT ALL
3. WORRYING TOO MUCH ABOUT DIFFERENT THINGS: SEVERAL DAYS
GAD7 TOTAL SCORE: 6
1. FEELING NERVOUS, ANXIOUS, OR ON EDGE: NEARLY EVERY DAY
4. TROUBLE RELAXING: NOT AT ALL
7. FEELING AFRAID AS IF SOMETHING AWFUL MIGHT HAPPEN: SEVERAL DAYS
IF YOU CHECKED OFF ANY PROBLEMS ON THIS QUESTIONNAIRE, HOW DIFFICULT HAVE THESE PROBLEMS MADE IT FOR YOU TO DO YOUR WORK, TAKE CARE OF THINGS AT HOME, OR GET ALONG WITH OTHER PEOPLE: SOMEWHAT DIFFICULT
8. IF YOU CHECKED OFF ANY PROBLEMS, HOW DIFFICULT HAVE THESE MADE IT FOR YOU TO DO YOUR WORK, TAKE CARE OF THINGS AT HOME, OR GET ALONG WITH OTHER PEOPLE?: SOMEWHAT DIFFICULT

## 2025-04-06 ASSESSMENT — PATIENT HEALTH QUESTIONNAIRE - PHQ9
SUM OF ALL RESPONSES TO PHQ QUESTIONS 1-9: 9
10. IF YOU CHECKED OFF ANY PROBLEMS, HOW DIFFICULT HAVE THESE PROBLEMS MADE IT FOR YOU TO DO YOUR WORK, TAKE CARE OF THINGS AT HOME, OR GET ALONG WITH OTHER PEOPLE: SOMEWHAT DIFFICULT
SUM OF ALL RESPONSES TO PHQ QUESTIONS 1-9: 9

## 2025-04-07 ENCOUNTER — VIRTUAL VISIT (OUTPATIENT)
Facility: CLINIC | Age: 70
End: 2025-04-07
Payer: COMMERCIAL

## 2025-04-07 DIAGNOSIS — F41.1 GENERALIZED ANXIETY DISORDER: Primary | ICD-10-CM

## 2025-04-07 PROCEDURE — 90837 PSYTX W PT 60 MINUTES: CPT | Mod: 95

## 2025-04-07 NOTE — PROGRESS NOTES
[facility]                                     Progress Note    Patient Name: Jolynn Haji  Date: 4/7/2025         Service Type: Individual      Session Start Time: 1:00 pm  Session End Time: 1:53 pm     Session Length: 53 min    Session #: 6 (previous therapy with Erika RAIN)    Attendees: Client attended alone    Service Modality:  Video Visit:      Provider verified identity through the following two step process.  Patient provided:  Patient address    Telemedicine Visit: The patient's condition can be safely assessed and treated via synchronous audio and visual telemedicine encounter.      Reason for Telemedicine Visit: Patient convenience (e.g. access to timely appointments / distance to available provider)    Originating Site (Patient Location): Patient's home    Distant Site (Provider Location): Provider Remote Setting- Home Office    Consent:  The patient/guardian has verbally consented to: the potential risks and benefits of telemedicine (video visit) versus in person care; bill my insurance or make self-payment for services provided; and responsibility for payment of non-covered services.     Patient would like the video invitation sent by:  My Chart    Mode of Communication:  [facility]    Distant Location (Provider):  Off-site    As the provider I attest to compliance with applicable laws and regulations related to telemedicine.    DATA  Interactive Complexity: No  Crisis: No      Progress Since Last Session (Related to Symptoms / Goals / Homework):   Symptoms: No change anxiety    Homework: Achieved / completed to satisfaction      Episode of Care Goals: Satisfactory progress - ACTION (Actively working towards change); Intervened by reinforcing change plan / affirming steps taken     Current / Ongoing Stressors and Concerns:  MRI/CAT scan inconclusive; neurologist suggest focus on addressing anxiety/depression. Off Venlafaxene. Current functioning, cognitive questions,  overwhelm. Tired of feeling anxious     Treatment Objective(s) Addressed in This Session:   Sleep hygiene  Grounding skills, mindfulness  Gratitude practice  Anxiety triggers  Panic psychoed     Intervention:  Interpersonal Therapy: provided active listening and validation of experience with stressors. Utilized rapport building, reinforced grounding skills, sleep hygiene strategies, GLAD gratitude practice , Leaves on a Stream mindfulness practice. Panic, mindfulness based stress reduction, progressive muscle relaxation,   DBT: Radical acceptance and Check the Facts psychoeducation.    Assessments completed prior to visit:  The following assessments were completed by patient for this visit:  PHQ2:       5/3/2023     1:18 PM 3/21/2023    11:20 AM 2/23/2023     7:15 PM 1/31/2023     3:21 PM 1/12/2023     4:28 PM 12/15/2022     9:12 PM 12/8/2022     9:38 AM   PHQ-2 ( 1999 Pfizer)   Q1: Little interest or pleasure in doing things   0 0 0  0   Q2: Feeling down, depressed or hopeless   0 0 0  0   PHQ-2 Score   0 0 0  0   Q1: Little interest or pleasure in doing things   Not at all Not at all Not at all  Not at all   Q2: Feeling down, depressed or hopeless   Not at all Not at all Not at all  Not at all   PHQ-2 Score Incomplete Incomplete 0 0 0 Incomplete 0     PHQ9:       12/12/2024     1:09 PM 12/17/2024     2:23 PM 1/1/2025    11:51 AM 1/8/2025    11:26 AM 2/5/2025    11:26 AM 3/1/2025     4:54 PM 4/6/2025     1:01 PM   PHQ-9 SCORE   PHQ-9 Total Score MyChart  10 (Moderate depression) 8 (Mild depression) 11 (Moderate depression) 7 (Mild depression) 6 (Mild depression) 9 (Mild depression)   PHQ-9 Total Score 12 10  8  11  7  6  9        Patient-reported     GAD2:       11/18/2024     9:17 AM 12/2/2024     5:19 PM 12/28/2024     3:34 PM 1/28/2025     3:51 PM 2/18/2025     3:52 PM 3/2/2025    12:35 PM 4/6/2025     1:06 PM   TIFFANY-2   Feeling nervous, anxious, or on edge 1 2 3 3 2 3 3   Not being able to stop or control  worrying 0 1 2 1 2 1 1   TIFFANY-2 Total Score 1  3  5  4  4  4  4        Patient-reported     GAD7:       12/17/2024     2:25 PM 1/6/2025     8:49 AM 1/8/2025    11:28 AM 1/28/2025     3:51 PM 2/18/2025     3:52 PM 2/28/2025     8:30 PM 4/6/2025     1:06 PM   TIFFANY-7 SCORE   Total Score 8 (mild anxiety) 9 (mild anxiety) 6 (mild anxiety) 5 (mild anxiety) 7 (mild anxiety) 7 (mild anxiety) 6 (mild anxiety)   Total Score 8  9  6  5  7  7  6        Patient-reported     CAGE-AID:       6/6/2017    12:05 PM 5/12/2022     2:26 PM   CAGE-AID Total Score   Total Score  0    0   Total Score MyChart  0 (A total score of 2 or greater is considered clinically significant)       Information is confidential and restricted. Go to Review Flowsheets to unlock data.     PROMIS 10-Global Health (only subscores and total score):       6/9/2024     8:48 PM 6/23/2024     8:01 PM 9/23/2024     9:33 AM 10/8/2024     4:11 PM 12/28/2024     3:39 PM 4/2/2025     1:09 PM 4/6/2025     1:17 PM   PROMIS-10 Scores Only   Global Mental Health Score 17 17    17 17    17    17 18 12  11  13    Global Physical Health Score 19 19    19 18    18    18 17 12  15  14    PROMIS TOTAL - SUBSCORES 36 36    36 35    35    35 35 24  26  27        Patient-reported     [place] Suicide Severity Rating Scale (Lifetime/Recent)      5/12/2022    10:07 PM 9/27/2022     2:16 PM 2/28/2024    10:11 AM   [place] Suicide Severity Rating (Lifetime/Recent)   1. Wish to be Dead (Lifetime) N Y Y   Wish to be Dead Description (Lifetime)  fleeting thought of wishing she were dead    1. Wish to be Dead (Past 1 Month)  N N   2. Non-Specific Active Suicidal Thoughts (Lifetime) N N N   Most Severe Ideation Rating (Lifetime)  1 1   Most Severe Ideation Rating (Past 1 Month)  1 1   Frequency (Lifetime)  1 1   Frequency (Past 1 Month)  1    Duration (Lifetime)  1 1   Duration (Past 1 Month)  1    Controllability (Lifetime)  1    Controllability (Past 1 Month)  1    Deterrents (Lifetime)  1     Deterrents (Past 1 Month)  1    Reasons for Ideation (Lifetime)  4 5   Reasons for Ideation (Past 1 Month)  4 0   Actual Attempt (Lifetime) N N N   Has subject engaged in non-suicidal self-injurious behavior? (Lifetime) N N N   Interrupted Attempts (Lifetime) N N N   Aborted or Self-Interrupted Attempt (Lifetime) N N N   Preparatory Acts or Behavior (Lifetime) N N N   Calculated C-SSRS Risk Score (Lifetime/Recent) No Risk Indicated No Risk Indicated No Risk Indicated         ASSESSMENT: Current Emotional / Mental Status (status of significant symptoms):   Risk status (Self / Other harm or suicidal ideation)   Patient denies current fears or concerns for personal safety.   Patient denies current or recent suicidal ideation or behaviors.   Patient denies current or recent homicidal ideation or behaviors.   Patient denies current or recent self injurious behavior or ideation.   Patient denies other safety concerns.   Patient reports there has been no change in risk factors since their last session.     Patient reports there has been no change in protective factors since their last session.     Recommended that patient call 911 or go to the local ED should there be a change in any of these risk factors     Appearance:   Appropriate    Eye Contact:   Good    Psychomotor Behavior: Normal  Restless    Attitude:   Cooperative    Orientation:   All   Speech    Rate / Production: Impoverished  Normal     Volume:  Normal    Mood:    Anxious  Normal   Affect:    Appropriate    Thought Content:  Clear    Thought Form:  Coherent  Logical  Circumstantial   Insight:    Fair      Medication Review:   No changes to current psychiatric medication(s) move off Venlafaxine complete     Medication Compliance:   Yes     Changes in Health Issues:   None reported     Chemical Use Review:   Substance Use: Chemical use reviewed, no active concerns identified      Tobacco Use: No current tobacco use.      Diagnosis:  1. Generalized anxiety  disorder        Collateral Reports Completed:   Not Applicable    PLAN: (Patient Tasks / Therapist Tasks / Other)  Continue  mindfulness grounding practices, GLAD gratitude, sleep hygiene, notice anxiety triggers, leaves on a stream, little or big deal. Check the facts, radical acceptance, use mindfulness, body scan, am meditation, panic skills    DINESH Campbell  4/7/25                                                            Individual Treatment Plan    Patient's Name: Jolynn Haji  YOB: 1955    Date of Creation: 3/325  Date Treatment Plan Last Reviewed/Revised:     DSM5 Diagnoses: 300.02 (F41.1) Generalized Anxiety Disorder  Psychosocial / Contextual Factors: aging, panic  PROMIS (reviewed every 90 days): see above    Referral / Collaboration:  Referral to another professional/service is not indicated at this time..    Anticipated number of session for this episode of care:  tbd  Anticipation frequency of session: Every other week  Anticipated Duration of each session: 38-52 minutes  Treatment plan will be reviewed in 90 days or when goals have been changed.       MeasurableTreatment Goal(s) related to diagnosis / functional impairment(s)  Goal 1: Patient will learn and practice skills to address anxiety    I will know I've met my goal when tbc.      Objective #A (Patient Action)    Patient will  : .  Sleep hygiene  Grounding skills, mindfulness  Gratitude practice    Status: New - Date: 3/3/25      Intervention(s)  Therapist will teach  sleep hygiene, grounding skills, mindfulness, gratitude practices and assign homework .      Patient has reviewed and agreed to the above plan.      DINESH Campbell  March 3, 2025                                           ______________________________________________________________________

## 2025-04-08 ENCOUNTER — MYC REFILL (OUTPATIENT)
Dept: PSYCHIATRY | Facility: CLINIC | Age: 70
End: 2025-04-08
Payer: COMMERCIAL

## 2025-04-08 DIAGNOSIS — F39 MOOD DISORDER: ICD-10-CM

## 2025-04-08 DIAGNOSIS — F41.1 GAD (GENERALIZED ANXIETY DISORDER): ICD-10-CM

## 2025-04-08 RX ORDER — SERTRALINE HYDROCHLORIDE 100 MG/1
100 TABLET, FILM COATED ORAL DAILY
Qty: 90 TABLET | Refills: 0 | OUTPATIENT
Start: 2025-04-08

## 2025-04-08 NOTE — TELEPHONE ENCOUNTER
RN received a refill request through Epic Rx Auth basket for Zoloft 100 mg tablet. This refill request was DENIED for the following reasons:    1.  This medication is to soon to refill. The patient had a 90 day prescription for this medication written 1/21/25 that should last the patient until approximately 4/21/25.  The patient has a appointment with Dr. Pompa this upcoming Friday and this medication can be refilled at the appointment.      Mina Patrick RN on 4/8/2025 at 3:57 PM

## 2025-04-08 NOTE — TELEPHONE ENCOUNTER
1) Reviewed refill request(s) from    [facility] - [place], [place] - [address] [place] [address]      2) Any Controlled Substance(s)? No    3) Refill(s) requested for:     - sertraline (ZOLOFT) 100 MG tablet  Date last ordered: 01/21/2025 Qty: 90 Refills: 0   request to be addressed at appointment on 04/11/2025          4) Action taken: contacted patient via via voicemail message at [phone], Pzda  message for pt to inform her that we received a refill request for her Zoloft and to remind her that she has an appointment coming up in a couple days when this will be filled. I put a refill reminder on the appointment schedule notes stating this needs to be filled at this appointment., and routed encounter back to RN Pool for review; not within LPN/MA Scope of Practice to deny.    Анна Morse MA on 4/8/2025 at 3:25 PM

## 2025-04-13 ASSESSMENT — PATIENT HEALTH QUESTIONNAIRE - PHQ9
SUM OF ALL RESPONSES TO PHQ QUESTIONS 1-9: 4
SUM OF ALL RESPONSES TO PHQ QUESTIONS 1-9: 4
10. IF YOU CHECKED OFF ANY PROBLEMS, HOW DIFFICULT HAVE THESE PROBLEMS MADE IT FOR YOU TO DO YOUR WORK, TAKE CARE OF THINGS AT HOME, OR GET ALONG WITH OTHER PEOPLE: VERY DIFFICULT

## 2025-04-15 ENCOUNTER — OFFICE VISIT (OUTPATIENT)
Dept: FAMILY MEDICINE | Facility: CLINIC | Age: 70
End: 2025-04-15
Payer: COMMERCIAL

## 2025-04-15 VITALS
WEIGHT: 125 LBS | DIASTOLIC BLOOD PRESSURE: 80 MMHG | RESPIRATION RATE: 18 BRPM | TEMPERATURE: 98.5 F | OXYGEN SATURATION: 100 % | HEART RATE: 75 BPM | HEIGHT: 67 IN | BODY MASS INDEX: 19.62 KG/M2 | SYSTOLIC BLOOD PRESSURE: 122 MMHG

## 2025-04-15 DIAGNOSIS — I10 ESSENTIAL HYPERTENSION WITH GOAL BLOOD PRESSURE LESS THAN 140/90: ICD-10-CM

## 2025-04-15 DIAGNOSIS — R25.1 TREMOR: ICD-10-CM

## 2025-04-15 DIAGNOSIS — F33.1 MODERATE RECURRENT MAJOR DEPRESSION (H): ICD-10-CM

## 2025-04-15 DIAGNOSIS — F41.9 ANXIETY: Primary | ICD-10-CM

## 2025-04-15 DIAGNOSIS — F43.23 ADJUSTMENT DISORDER WITH MIXED ANXIETY AND DEPRESSED MOOD: ICD-10-CM

## 2025-04-15 DIAGNOSIS — G47.00 INSOMNIA, UNSPECIFIED TYPE: ICD-10-CM

## 2025-04-15 DIAGNOSIS — G21.19 DRUG-INDUCED PARKINSONISM: ICD-10-CM

## 2025-04-15 DIAGNOSIS — R41.843: ICD-10-CM

## 2025-04-15 DIAGNOSIS — F51.02 ADJUSTMENT INSOMNIA: ICD-10-CM

## 2025-04-15 DIAGNOSIS — R51.9 CHRONIC DAILY HEADACHE: ICD-10-CM

## 2025-04-15 PROCEDURE — 90480 ADMN SARSCOV2 VAC 1/ONLY CMP: CPT | Performed by: FAMILY MEDICINE

## 2025-04-15 PROCEDURE — G2211 COMPLEX E/M VISIT ADD ON: HCPCS | Performed by: FAMILY MEDICINE

## 2025-04-15 PROCEDURE — 3074F SYST BP LT 130 MM HG: CPT | Performed by: FAMILY MEDICINE

## 2025-04-15 PROCEDURE — 91320 SARSCV2 VAC 30MCG TRS-SUC IM: CPT | Performed by: FAMILY MEDICINE

## 2025-04-15 PROCEDURE — 99214 OFFICE O/P EST MOD 30 MIN: CPT | Performed by: FAMILY MEDICINE

## 2025-04-15 PROCEDURE — 96127 BRIEF EMOTIONAL/BEHAV ASSMT: CPT | Performed by: FAMILY MEDICINE

## 2025-04-15 PROCEDURE — 3079F DIAST BP 80-89 MM HG: CPT | Performed by: FAMILY MEDICINE

## 2025-04-15 RX ORDER — TRAZODONE HYDROCHLORIDE 50 MG/1
25-50 TABLET ORAL
Qty: 90 TABLET | Refills: 1 | Status: SHIPPED | OUTPATIENT
Start: 2025-04-15

## 2025-04-15 NOTE — PATIENT INSTRUCTIONS
" [facility]  [address] [place] [address]  Office: [phone]   Fax:    [phone]     Return in about 7 weeks (around 6/3/2025) for depression, anxiety and headaches , w/ Dr. SUE for 40 min appt.    Based on our discussion, I have outlined the following instructions for you:      - Keep taking the same amount of Zoloft as you are now as directed by Dr. Enriqueta Pompa psychiatry.   - Keep going to your therapy sessions with Mica Malave and Mica George.  - Continue taking your current medications as prescribed.  - Take the TV out of your bedroom to help you sleep better.    Thank you again for your visit, and we look forward to supporting you in your journey to better health.     Thank you so much or choosing [facility]  for your Health Care. It was a pleasure seeing you at your visit today! Please contact us with any questions or concerns you may have.                   Helga Ibarra MD                              To reach your [facility] care team after hours call:   [phone] press #[phone] \"to speak with your care team\".  This will get you to our clinic instead of routing to central [facility]  scheduling.     PLEASE NOTE OUR HOURS HAVE CHANGED secondary to COVID-19 coronavirus pandemic, as we are trying to minimize patient exposure to the virus,  which is now widespread in the [facility].  These hours may change with very little notice.  We apologize for any inconvenience.       Our current clinic hours are:          Monday- Thursday   7:00am - 6:00pm  in person.      Friday  7:00am- 5:00pm                       Saturday and Sunday : Closed to in person and virtual visits        We have telephone and virtual visit times available between    7:00am - 6pm on Monday-Friday as well.                                                Phone:  [phone]      Our pharmacy hours: Monday through Friday 8:00am to " 5:00pm                        Saturday - 9:00 am to 12 noon       Sunday : Closed.              Phone:  [phone]              ###  Please note: at this time we are not accepting any walk-in visits. ###      There is also information available at our web site:  [email or web address]    If your provider ordered any lab tests and you do not receive the results within 10 business days, please call the clinic.    If you need a medication refill please contact your pharmacy.  Please allow 3 business days for your refill to be completed.    Our clinic offers telephone visits and e visits.  Please ask one of your team members to explain more.      Use [employer] (secure email communication and access to your chart) to send your primary care provider a message or make an appointment. Ask someone on your Team how to sign up for [email or web address].

## 2025-04-15 NOTE — PROGRESS NOTES
[facility]  [address] [place] [address]  Office: [phone]   Fax:    [phone]       Assessment & Plan       ICD-10-CM    1. Anxiety  F41.9       2. Adjustment disorder with mixed anxiety and depressed mood- mild  at this time  F43.23       3. Adjustment insomnia - resolved as of 5/5/2023 - off of ambien with psychiatry's okay   F51.02       4. 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 - much improved -mostly  resolved as of 5/5/2023 - slight presence noted 3/5/2025  R41.843       5. Drug-induced parkinsonism (H)- Abilify - generic = ARIPiprazole( 5mg) - RESOLVED OFF ABILIFY   G21.19       6. Moderate recurrent major depression (H)  F33.1       7. Essential hypertension with goal blood pressure less than 140/90 - then Orthostatic hypotension- resolved off of lisinopril 2.5mg daily and doing daily 2.25 mile walks with her dog  I10       8. Tremor  R25.1       9. Insomnia, unspecified type  G47.00 traZODone (DESYREL) 50 MG tablet      10. Chronic daily headache- since depression started 2+ years ago - offered  referral to see Dr. Denisa Mcdonough- [facility] - pt will think about it  R51.9       Return in about 7 weeks (around 6/3/2025) for depression, anxiety and headaches , w/ Dr. SUE for 40 min appt.     Assessment & Plan  - Anxiety: Anxiety related to social situations and fear of judgment. Improved with increased Zoloft dosage. Continue current Zoloft dosage. Consider minimizing exposure to negative energy sources.    - Adjustment disorder with mixed anxiety and depressed mood: Symptoms likely exacerbated by poorly controlled depression and anxiety. Continue therapy sessions with Mica Malave and Mica George. Maintain current medication regimen.    - Adjustment insomnia: Improved sleep with increased Zoloft dosage. Continue current Zoloft dosage. Remove TV from the bedroom to improve sleep hygiene.    -  "Moderate recurrent major depression (H): Depression contributing to cognitive difficulties and headaches. Continue current Zoloft dosage. Maintain therapy sessions.    - Essential hypertension with goal blood pressure less than 140/90: Blood pressure well-controlled at 122/80. Continue current medication regimen.    - Tremor: Tremor appears stable. Continue current medication regimen.    - Insomnia, unspecified type: Improved with increased Zoloft dosage. Continue current Zoloft dosage. Remove TV from the bedroom to improve sleep hygiene.    MEDICATIONS:   Orders Placed This Encounter   Medications    traZODone (DESYREL) 50 MG tablet     Sig: Take 0.5-1 tablets (25-50 mg) by mouth nightly as needed for sleep.     Dispense:  90 tablet     Refill:  1     ### Profile Rx: patient will contact pharmacy when needed ###          - Continue other medications without change  Regular exercise  See Patient Instructions.     36 minutes spent by me on the date of the encounter doing chart review, history and exam, documentation and further activities per the note      The longitudinal plan of care for the diagnosis(es)/condition(s) as documented were addressed during this visit. Due to the added complexity in care, I will continue to support Azalea in the subsequent management and with ongoing continuity of care.    \"Consent was obtained from the patient to use an AI scribe/documentation tool in the creation of this note.This note/dictation was performed and completed with the assistance of voice recognition software and an AI scribe. It may contain inadvertant transcription  errors,  omissions and/or  inadvertent word substitution.\" --Helga Ibarra MD           Subjective   Azalea is a 69 year old, presenting for the following health issues:  Headache, Depression, and Anxiety  and the following other medical problems:      1. Anxiety    2. Adjustment disorder with mixed anxiety and depressed mood- mild  at this time  " "  3. Adjustment insomnia - resolved as of 5/5/2023 - off of ambien with psychiatry's okay     4. 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 - much improved -mostly  resolved as of 5/5/2023 - slight presence noted 3/5/2025    5. Drug-induced parkinsonism (H)- Abilify - generic = ARIPiprazole( 5mg) - RESOLVED OFF ABILIFY     6. Moderate recurrent major depression (H)    7. Essential hypertension with goal blood pressure less than 140/90 - then Orthostatic hypotension- resolved off of lisinopril 2.5mg daily and doing daily 2.25 mile walks with her dog    8. Tremor    9. Insomnia, unspecified type    10. Chronic daily headache- since depression started 2+ years ago - offered  referral to see Dr. Denisa Mcdonough- [facility] - pt will think about it            4/15/2025     3:03 PM   Additional Questions   Roomed by Theresa LOGAN     History of Present Illness       Mental Health Follow-up:  Patient presents to follow-up on Depression & Anxiety.Patient's depression since last visit has been:  No change  The patient is not having other symptoms associated with depression.  Patient's anxiety since last visit has been:  No change  The patient is not having other symptoms associated with anxiety.  Any significant life events: No  Patient is feeling anxious or having panic attacks.  Patient has no concerns about alcohol or drug use.    Hypertension: She presents for follow up of hypertension.  She does check blood pressure  regularly outside of the clinic. Outpatient blood pressures have not been over 140/90. She does not follow a low salt diet.     Headaches:   Since the patient's last clinic visit, headaches are: no change  The patient is getting headaches:  Daily  She is able to do normal daily activities when she has a migraine.  The patient is taking the following rescue/relief medications:  Tylenol   Patient states \"I get some relief\" from the rescue/relief medications. "   The patient is taking the following medications to prevent migraines:  No medications to prevent migraines  In the past 4 weeks, the patient has gone to an Urgent Care or Emergency Room 0 times times due to headaches.    She eats 2-3 servings of fruits and vegetables daily.She consumes 0 sweetened beverage(s) daily.She exercises with enough effort to increase her heart rate 30 to 60 minutes per day.  She exercises with enough effort to increase her heart rate 3 or less days per week.   She is taking medications regularly.   Azalea Haji, 69 years    Memory Issues: Azalea experiences memory lapses, like forgetting why she went to the basement but eventually recalling. She met with neurologist Dr. Silverio Garcia on April 4, 2025, who found no immediate evidence of normal pressure hydrocephalus. Her cognitive difficulties are likely related to poorly controlled depression and anxiety. CT scans, including one on March 6, 2025, showed no acute intracranial abnormalities but a dilated ventricular system similar to 2017, suggesting normal pressure hydrocephalus. Azalea is not willing to undergo further neurological testing.    Headaches: Azalea has headaches linked to poorly controlled depression. She increased her Zoloft dosage three days ago, leading to significant improvement in symptoms, including better sleep without sleeping pills.    Anxiety: Azalea's anxiety, worsened by social situations, is partly due to concerns about memory lapses. She managed a recent social event without an anxiety attack.    Depression: Azalea's depression is linked to headaches and memory issues. She feels sad during bad weather. The recent Zoloft dosage increase improved her mood and sleep. She continues regular therapy.    Dizziness: Azalea feels dizzy, especially when getting out of the car. She suspects lisinopril might be causing it, though she's unsure.    Tremor: Azalea's tremor remains unchanged.    Social Interactions: Azalea is engaging  more in social activities, like having lunch with friends, which she previously avoided due to anxiety. She had a good time and plans future gatherings.  History of Present Illness-  Azalea Haji, 69 years    Memory Issues: Azalea experiences memory lapses, like forgetting why she went to the basement but eventually recalling. She met with neurologist Dr. Silverio Garica on April 4, 2025, who found no immediate evidence of normal pressure hydrocephalus. Her cognitive difficulties are likely related to poorly controlled depression and anxiety. CT scans, including one on March 6, 2025, showed no acute intracranial abnormalities but a dilated ventricular system similar to 2017, suggesting normal pressure hydrocephalus. Azalea is not willing to undergo further neurological testing.    Headaches: Azalea has headaches linked to poorly controlled depression. She increased her Zoloft dosage three days ago, leading to significant improvement in symptoms, including better sleep without sleeping pills.    Anxiety: Azalea's anxiety, worsened by social situations, is partly due to concerns about memory lapses. She managed a recent social event ,  lunch with her girlfriends together, without an anxiety attack.    Depression: Azalea's depression is linked to headaches and memory issues. She feels sad during bad weather. The recent Zoloft dosage increase improved her mood and sleep. She continues regular therapy.    Dizziness: Azalea feels dizzy occasionally , especially when getting out of the car. She hasn't been taking  lisinopril recently at all .    Tremor: Azalea's benign essential fine tremor remains unchanged.    Social Interactions: Azalea is engaging more in social activities, like having lunch with friends, which she previously avoided due to anxiety. She had a good time and plans future gatherings.        4/6/2025     1:01 PM 4/10/2025     8:43 AM 4/13/2025     5:39 PM   PHQ   PHQ-9 Total Score 9  12  4    Q9: Thoughts of better  off dead/self-harm past 2 weeks Not at all Not at all Not at all       Patient-reported          2/18/2025     3:52 PM 2/28/2025     8:30 PM 4/6/2025     1:06 PM   TIFFANY-7 SCORE   Total Score 7 (mild anxiety) 7 (mild anxiety) 6 (mild anxiety)   Total Score 7  7  6        Patient-reported        Patient Active Problem List   Diagnosis    Essential hypertension with goal blood pressure less than 140/90 - then Orthostatic hypotension- resolved off of lisinopril 2.5mg daily and doing daily 2.25 mile walks with her dog    Hidradenitis    Localized osteoarthritis of hand    Synovial cyst of popliteal space    DERMATOPHYTOSIS OF NAIL- toenails     Lipidosis    Osteopenia    Postmenopausal    NUMBNESS AND TINGLING OF FOOT - bilateral -mild     Numbness of feet- distal feet R>L     Stress incontinence, female - mild    Cough    Acute bronchitis- recurrent- ? related to mold in school building- pt has since retired and no further bronchitis    Rosacea    History of migraine headaches- assoc. with nausea/vomiting - resolved around menopause - were  premenstrual     Dupuytren's disease of palm - right 4th digit flexor tendon    Sun-damaged skin    Seasonal allergic rhinitis    Basal cell carcinoma of leg, right    Adjustment disorder with mixed anxiety and depressed mood- mild  at this time    Family history of celiac disease    TIFFANY (generalized anxiety disorder)    Insomnia, unspecified type    Chronic constipation    Raynaud's phenomenon without gangrene    Other secondary osteoarthritis of left knee    Primary localized osteoarthrosis of left lower leg    Hyperlipidemia LDL goal <130    Basal cell carcinoma    Colon polyps    Screening for cervical cancer    Adjustment insomnia - resolved as of 5/5/2023 - off of ambien with psychiatry's okay     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 - much improved -mostly  resolved as of 5/5/2023 - slight presence noted 3/5/2025     Drug-induced parkinsonism (H)- Abilify - generic = ARIPiprazole( 5mg) - RESOLVED OFF ABILIFY     Mood disorder    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 Denisa Mcdonough- [facility] - pt will think about it       Current Outpatient Medications   Medication Sig Dispense Refill    lisinopril (ZESTRIL) 2.5 MG tablet Take 1 tablet (2.5 mg) by mouth daily as needed (BP >135/85). 90 tablet 3    propranolol ER (INDERAL LA) 60 MG 24 hr capsule TAKE ONE CAPSULE BY MOUTH ONCE DAILY 90 capsule 3    sertraline (ZOLOFT) 100 MG tablet Take 1 tablet (100 mg) by mouth daily. Take WITH 25 mg tab for total daily dose 125 mg. 90 tablet 1    sertraline (ZOLOFT) 25 MG tablet Take 1 tablet (25 mg) by mouth daily. Take WITH 100 mg tab for total daily dose 125 mg. 90 tablet 1    traZODone (DESYREL) 50 MG tablet Take 0.5-1 tablets (25-50 mg) by mouth nightly as needed for sleep. 90 tablet 1    clindamycin (CLEOCIN T) 1 % external lotion Apply topically 2 times daily. For acne flares 60 mL 3    lactobacillus rhamnosus, GG, (CULTURELL) capsule Take 1 capsule by mouth daily Taking every other day            Allergies   Allergen Reactions    Abilify [Aripiprazole] Other (See Comments)     Suspected drug-induced Parkinsonism     Fosamax Diarrhea     Diarrhea, stomach cramps, jaw pain     Amoxicillin-Pot Clavulanate Itching and Rash    Amoxicillin-Pot Clavulanate Itching and Rash         Review of Systems  Constitutional, HEENT,  "cardiovascular, pulmonary, GI, , musculoskeletal, neuro, skin, endocrine and psych systems are negative, except as otherwise noted.      Objective    /80   Pulse 75   Temp 98.5  F (36.9  C)   Resp 18   Ht 1.702 m (5' 7\")   Wt 56.7 kg (125 lb)   LMP 06/28/2001   SpO2 100%   BMI 19.58 kg/m    Body mass index is 19.58 kg/m .  Physical Exam   GENERAL: alert and no distress  EYES: Eyes grossly normal to inspection,  and conjunctivae and sclerae normal  HENT:  nose and mouth without ulcers or lesions  NECK: no asymmetry, masses, or scars  RESP: lungs audibly clear, no audible wheezes  MS: no gross musculoskeletal defects noted   SKIN: no visible suspicious lesions or rashes  NEURO: Normal strength and tone, mentation intact and speech normal  PSYCH: mentation appears normal, affect just not quite her baseline, just under her normal/bright, Alert and oriented. No acute distress. Appears well-groomed and casually dressed. Affect is very mildly depressed.and subdued when compared with her baseline.  In good humor and laughs appropriately. Very mildly  anxious. No evidence of psychosis.     Office Visit on 03/04/2025   Component Date Value Ref Range Status    Case Report 03/04/2025    Final                    Value:Surgical Pathology Report                         Case: [id number]                                  Authorizing Provider:  Ligia Carbajal PA-C    Collected:           03/04/2025 10:50 AM          Ordering Location:     [facility]   Received:            03/04/2025 11:17 AM                                 [facility]                                                           Pathologist:           Zach Qiu MD                                                       Specimens:   A) - Skin, left forearm                                                                             B) - Skin, left upper arm                                                                       " "    C) - Skin, left ala                                                                        Final Diagnosis 03/04/2025    Final                    Value:A. Left forearm:  - Squamous cell carcinoma in situ - (see description)     B. Left upper arm:  - Basal cell carcinoma, superficial type - (see description)     C. Left ala:  - Basal cell carcinoma, nodular type - (see description)        Clinical Information 03/04/2025    Final                    Value:The patient is a 69 year old female.       Gross Description 03/04/2025    Final                    Value:A(1). Skin, left forearm:  The specimen is received in formalin with proper patient identification, labeled \"L forearm\".  The specimen consists of a 0.7 x 0.5 cm white-tan skin shave remarkable for a 0.4 x 0.4 cm brown-tan lesion.  The subcutaneous surface is inked black, and the specimen is serially sectioned and entirely submitted in cassette A1.   B(2). Skin, left upper arm:  The specimen is received in formalin with proper patient identification, labeled \"L upper arm\".  The specimen consists of a 0.7 x 0.5 cm white-tan skin shave remarkable for a 0.1 cm brown-tan lesion.  The subcutaneous surface is inked black, and the specimen is serially sectioned and entirely submitted in cassette B1.   C(3). Skin, left ala:  The specimen is received in formalin with proper patient identification, labeled \"L nasal ala\".  The specimen consists of a 0.5 x 0.2 cm white-tan skin shave remarkable for a 0.1 cm brown-tan lesion.  The subcutaneous surface is inked black, and the specimen is bisected                           and entirely submitted in cassette C1.       Microscopic Description 03/04/2025    Final                    Value:A. The specimen exhibits epidermal hyperplasia with full-thickness keratinocyte atypia and suprabasal mitoses.    B. The specimen exhibits a tumor of atypical basaloid epithelium arranged as buds off the epidermal undersurface with fibrotic " stroma and clefting artifact.   C. The specimen exhibits small buds of BerEP4 positive epithelium emanating from the epidermal undersurface and a dermal keyhole sign suggestive of tumor which has dropped out of the specimen. These features are highly suggestive of basal cell carcinoma.      MCRS 03/04/2025 Yes (A)  N/A Final    Performing Labs 03/04/2025    Final                    Value:The technical component of this testing was completed at [facility].    Stain controls for all stains resulted within this report have been reviewed and show appropriate reactivity.              Signed Electronically by: Helga Ibarra MD

## 2025-04-17 ENCOUNTER — VIRTUAL VISIT (OUTPATIENT)
Facility: CLINIC | Age: 70
End: 2025-04-17
Payer: COMMERCIAL

## 2025-04-17 DIAGNOSIS — F41.1 GENERALIZED ANXIETY DISORDER: Primary | ICD-10-CM

## 2025-04-17 NOTE — PROGRESS NOTES
[facility]                                     Progress Note    Patient Name: Jolynn Haji  Date: 4/17/2025         Service Type: Individual      Session Start Time: 12:10 pm  Session End Time: 12:30 pm     Session Length: 20 min    Session #: 7 (previous therapy with Erika RAIN)    Attendees: Client attended alone    Service Modality:  Video Visit:      Provider verified identity through the following two step process.  Patient provided:  Patient address    Telemedicine Visit: The patient's condition can be safely assessed and treated via synchronous audio and visual telemedicine encounter.      Reason for Telemedicine Visit: Patient convenience (e.g. access to timely appointments / distance to available provider)    Originating Site (Patient Location): Patient's home    Distant Site (Provider Location): Provider Remote Setting- Home Office    Consent:  The patient/guardian has verbally consented to: the potential risks and benefits of telemedicine (video visit) versus in person care; bill my insurance or make self-payment for services provided; and responsibility for payment of non-covered services.     Patient would like the video invitation sent by:  My Chart    Mode of Communication:  [facility] Conference [facility]    Distant Location (Provider):  Off-site    As the provider I attest to compliance with applicable laws and regulations related to telemedicine.    DATA  Interactive Complexity: No  Crisis: No      Progress Since Last Session (Related to Symptoms / Goals / Homework):   Symptoms: Improving anxiety and energy    Homework: Achieved / completed to satisfaction      Episode of Care Goals: Satisfactory progress - ACTION (Actively working towards change); Intervened by reinforcing change plan / affirming steps taken     Current / Ongoing Stressors and Concerns:  Meds increase/change, seeing improvement.  MRI/CAT scan inconclusive; neurologist suggest focus on addressing anxiety/depression.  Off Venlafaxene. Current functioning, cognitive questions, overwhelm. Tired of feeling anxious     Treatment Objective(s) Addressed in This Session:   Sleep hygiene  Grounding skills, mindfulness  Gratitude practice  Anxiety triggers  Panic psychoed     Intervention:  Interpersonal Therapy: provided active listening and validation of experience with stressors. Utilized rapport building, reinforced grounding skills, sleep hygiene strategies, GLAD gratitude practice , Leaves on a Stream mindfulness practice, panic, mindfulness based stress reduction, progressive muscle relaxation,   DBT: Radical acceptance and Check the Facts psychoeducation reinforcement.    Assessments completed prior to visit:  The following assessments were completed by patient for this visit:  PHQ2:       5/3/2023     1:18 PM 3/21/2023    11:20 AM 2/23/2023     7:15 PM 1/31/2023     3:21 PM 1/12/2023     4:28 PM 12/15/2022     9:12 PM 12/8/2022     9:38 AM   PHQ-2 ( 1999 [employer])   Q1: Little interest or pleasure in doing things   0 0 0  0   Q2: Feeling down, depressed or hopeless   0 0 0  0   PHQ-2 Score   0 0 0  0   Q1: Little interest or pleasure in doing things   Not at all Not at all Not at all  Not at all   Q2: Feeling down, depressed or hopeless   Not at all Not at all Not at all  Not at all   PHQ-2 Score Incomplete Incomplete 0 0 0 Incomplete 0     PHQ9:       1/1/2025    11:51 AM 1/8/2025    11:26 AM 2/5/2025    11:26 AM 3/1/2025     4:54 PM 4/6/2025     1:01 PM 4/10/2025     8:43 AM 4/13/2025     5:39 PM   PHQ-9 SCORE   PHQ-9 Total Score MyChart 8 (Mild depression) 11 (Moderate depression) 7 (Mild depression) 6 (Mild depression) 9 (Mild depression) 12 (Moderate depression) 4 (Minimal depression)   PHQ-9 Total Score 8  11  7  6  9  12  4        Patient-reported     GAD2:       11/18/2024     9:17 AM 12/2/2024     5:19 PM 12/28/2024     3:34 PM 1/28/2025     3:51 PM 2/18/2025     3:52 PM 3/2/2025    12:35 PM 4/6/2025     1:06 PM   TIFFANY-2    Feeling nervous, anxious, or on edge 1 2 3 3 2 3 3   Not being able to stop or control worrying 0 1 2 1 2 1 1   TIFFANY-2 Total Score 1  3  5  4  4  4  4        Patient-reported     GAD7:       12/17/2024     2:25 PM 1/6/2025     8:49 AM 1/8/2025    11:28 AM 1/28/2025     3:51 PM 2/18/2025     3:52 PM 2/28/2025     8:30 PM 4/6/2025     1:06 PM   TIFFANY-7 SCORE   Total Score 8 (mild anxiety) 9 (mild anxiety) 6 (mild anxiety) 5 (mild anxiety) 7 (mild anxiety) 7 (mild anxiety) 6 (mild anxiety)   Total Score 8  9  6  5  7  7  6        Patient-reported     CAGE-AID:       6/6/2017    12:05 PM 5/12/2022     2:26 PM   CAGE-AID Total Score   Total Score  0    0   Total Score MyChart  0 (A total score of 2 or greater is considered clinically significant)       Information is confidential and restricted. Go to Review Flowsheets to unlock data.     PROMIS 10-Global Health (only subscores and total score):       6/9/2024     8:48 PM 6/23/2024     8:01 PM 9/23/2024     9:33 AM 10/8/2024     4:11 PM 12/28/2024     3:39 PM 4/2/2025     1:09 PM 4/6/2025     1:17 PM   PROMIS-10 Scores Only   Global Mental Health Score 17 17    17 17    17    17 18 12  11  13    Global Physical Health Score 19 19    19 18    18    18 17 12  15  14    PROMIS TOTAL - SUBSCORES 36 36    36 35    35    35 35 24  26  27        Patient-reported     [place] Suicide Severity Rating Scale (Lifetime/Recent)      5/12/2022    10:07 PM 9/27/2022     2:16 PM 2/28/2024    10:11 AM   [place] Suicide Severity Rating (Lifetime/Recent)   1. Wish to be Dead (Lifetime) N Y Y   Wish to be Dead Description (Lifetime)  fleeting thought of wishing she were dead    1. Wish to be Dead (Past 1 Month)  N N   2. Non-Specific Active Suicidal Thoughts (Lifetime) N N N   Most Severe Ideation Rating (Lifetime)  1 1   Most Severe Ideation Rating (Past 1 Month)  1 1   Frequency (Lifetime)  1 1   Frequency (Past 1 Month)  1    Duration (Lifetime)  1 1   Duration (Past 1 Month)  1     Controllability (Lifetime)  1    Controllability (Past 1 Month)  1    Deterrents (Lifetime)  1    Deterrents (Past 1 Month)  1    Reasons for Ideation (Lifetime)  4 5   Reasons for Ideation (Past 1 Month)  4 0   Actual Attempt (Lifetime) N N N   Has subject engaged in non-suicidal self-injurious behavior? (Lifetime) N N N   Interrupted Attempts (Lifetime) N N N   Aborted or Self-Interrupted Attempt (Lifetime) N N N   Preparatory Acts or Behavior (Lifetime) N N N   Calculated C-SSRS Risk Score (Lifetime/Recent) No Risk Indicated No Risk Indicated No Risk Indicated         ASSESSMENT: Current Emotional / Mental Status (status of significant symptoms):   Risk status (Self / Other harm or suicidal ideation)   Patient denies current fears or concerns for personal safety.   Patient denies current or recent suicidal ideation or behaviors.   Patient denies current or recent homicidal ideation or behaviors.   Patient denies current or recent self injurious behavior or ideation.   Patient denies other safety concerns.   Patient reports there has been no change in risk factors since their last session.     Patient reports there has been no change in protective factors since their last session.     Recommended that patient call 911 or go to the local ED should there be a change in any of these risk factors     Appearance:   Appropriate    Eye Contact:   Good    Psychomotor Behavior: Normal  Restless    Attitude:   Cooperative    Orientation:   All   Speech    Rate / Production: Impoverished  Normal     Volume:  Normal    Mood:    Anxious  Normal   Affect:    Appropriate    Thought Content:  Clear    Thought Form:  Coherent  Logical  Circumstantial   Insight:    Fair      Medication Review:   Changes to psychiatric medications, see updated Medication List in EPIC.  move off Venlafaxine complete     Medication Compliance:   Yes     Changes in Health Issues:   None reported     Chemical Use Review:   Substance Use: Chemical use  reviewed, no active concerns identified      Tobacco Use: No current tobacco use.      Diagnosis:  1. Generalized anxiety disorder        Collateral Reports Completed:   Not Applicable    PLAN: (Patient Tasks / Therapist Tasks / Other)  Continue  mindfulness grounding practices, GLAD gratitude, sleep hygiene, notice anxiety triggers, leaves on a stream, little or big deal. Check the facts, radical acceptance, use mindfulness, body scan, am meditation, panic skills    DINESH Campbell  4/17/25                                                            Individual Treatment Plan    Patient's Name: Jolynn Haji  YOB: 1955    Date of Creation: 3/325  Date Treatment Plan Last Reviewed/Revised:     DSM5 Diagnoses: 300.02 (F41.1) Generalized Anxiety Disorder  Psychosocial / Contextual Factors: aging, panic  PROMIS (reviewed every 90 days): see above    Referral / Collaboration:  Referral to another professional/service is not indicated at this time..    Anticipated number of session for this episode of care:  tbd  Anticipation frequency of session: Every other week  Anticipated Duration of each session: 38-52 minutes  Treatment plan will be reviewed in 90 days or when goals have been changed.       MeasurableTreatment Goal(s) related to diagnosis / functional impairment(s)  Goal 1: Patient will learn and practice skills to address anxiety    I will know I've met my goal when tbc.      Objective #A (Patient Action)    Patient will  : .  Sleep hygiene  Grounding skills, mindfulness  Gratitude practice  Breathing skills  Anxiety triggers  Panic psychoed    Status: New - Date: 3/3/25      Intervention(s)  Therapist will teach  sleep hygiene, grounding skills, mindfulness, gratitude practices and assign homework .      Patient has reviewed and agreed to the above plan.      DINESH Campbell  March 3, 2025                                            ______________________________________________________________________

## 2025-04-21 ENCOUNTER — VIRTUAL VISIT (OUTPATIENT)
Facility: CLINIC | Age: 70
End: 2025-04-21
Payer: COMMERCIAL

## 2025-04-21 DIAGNOSIS — F41.1 GENERALIZED ANXIETY DISORDER: Primary | ICD-10-CM

## 2025-04-21 PROCEDURE — 90834 PSYTX W PT 45 MINUTES: CPT | Mod: 95

## 2025-04-21 NOTE — PROGRESS NOTES
[facility]                                     Progress Note    Patient Name: Jolynn Haji  Date: 4/21/2025         Service Type: Individual      Session Start Time: 1:05 pm  Session End Time: 1:45 pm     Session Length: 40 min    Session #: 7 (previous therapy with Erika RAIN)    Attendees: Client attended alone    Service Modality:  Video Visit:      Provider verified identity through the following two step process.  Patient provided:  Patient address    Telemedicine Visit: The patient's condition can be safely assessed and treated via synchronous audio and visual telemedicine encounter.      Reason for Telemedicine Visit: Patient convenience (e.g. access to timely appointments / distance to available provider)    Originating Site (Patient Location): Patient's home    Distant Site (Provider Location): Provider Remote Setting- Home Office    Consent:  The patient/guardian has verbally consented to: the potential risks and benefits of telemedicine (video visit) versus in person care; bill my insurance or make self-payment for services provided; and responsibility for payment of non-covered services.     Patient would like the video invitation sent by:  My Chart    Mode of Communication:  Video Conference via [facility]    Distant Location (Provider):  Off-site    As the provider I attest to compliance with applicable laws and regulations related to telemedicine.    DATA  Interactive Complexity: No  Crisis: No      Progress Since Last Session (Related to Symptoms / Goals / Homework):   Symptoms: Improving anxiety and energy    Homework: Achieved / completed to satisfaction      Episode of Care Goals: Satisfactory progress - ACTION (Actively working towards change); Intervened by reinforcing change plan / affirming steps taken     Current / Ongoing Stressors and Concerns:  Meds increase/change, seeing improvement.  MRI/CAT scan inconclusive; neurologist suggests focus on addressing anxiety/depression. Off  Venlafaxene. Current functioning, cognitive questions, overwhelm. Tired of feeling anxious     Treatment Objective(s) Addressed in This Session:   Sleep hygiene  Grounding skills, mindfulness  Gratitude practice  Anxiety triggers  Panic psychoed     Intervention:  Interpersonal Therapy: provided active listening and validation of experience with stressors. Utilized rapport building, reinforced grounding skills, sleep hygiene strategies, GLAD gratitude practice , Leaves on a Stream mindfulness practice, panic, mindfulness based stress reduction, progressive muscle relaxation,   DBT: Radical acceptance and Check the Facts psychoeducation reinforcement.    Assessments completed prior to visit:  The following assessments were completed by patient for this visit:  PHQ2:       5/3/2023     1:18 PM 3/21/2023    11:20 AM 2/23/2023     7:15 PM 1/31/2023     3:21 PM 1/12/2023     4:28 PM 12/15/2022     9:12 PM 12/8/2022     9:38 AM   PHQ-2 ( 1999 [employer])   Q1: Little interest or pleasure in doing things   0 0 0  0   Q2: Feeling down, depressed or hopeless   0 0 0  0   PHQ-2 Score   0 0 0  0   Q1: Little interest or pleasure in doing things   Not at all Not at all Not at all  Not at all   Q2: Feeling down, depressed or hopeless   Not at all Not at all Not at all  Not at all   PHQ-2 Score Incomplete Incomplete 0 0 0 Incomplete 0     PHQ9:       1/1/2025    11:51 AM 1/8/2025    11:26 AM 2/5/2025    11:26 AM 3/1/2025     4:54 PM 4/6/2025     1:01 PM 4/10/2025     8:43 AM 4/13/2025     5:39 PM   PHQ-9 SCORE   PHQ-9 Total Score MyChart 8 (Mild depression) 11 (Moderate depression) 7 (Mild depression) 6 (Mild depression) 9 (Mild depression) 12 (Moderate depression) 4 (Minimal depression)   PHQ-9 Total Score 8  11  7  6  9  12  4        Patient-reported     GAD2:       11/18/2024     9:17 AM 12/2/2024     5:19 PM 12/28/2024     3:34 PM 1/28/2025     3:51 PM 2/18/2025     3:52 PM 3/2/2025    12:35 PM 4/6/2025     1:06 PM   TIFFANY-2    Feeling nervous, anxious, or on edge 1 2 3 3 2 3 3   Not being able to stop or control worrying 0 1 2 1 2 1 1   TIFFANY-2 Total Score 1  3  5  4  4  4  4        Patient-reported     GAD7:       12/17/2024     2:25 PM 1/6/2025     8:49 AM 1/8/2025    11:28 AM 1/28/2025     3:51 PM 2/18/2025     3:52 PM 2/28/2025     8:30 PM 4/6/2025     1:06 PM   TIFFANY-7 SCORE   Total Score 8 (mild anxiety) 9 (mild anxiety) 6 (mild anxiety) 5 (mild anxiety) 7 (mild anxiety) 7 (mild anxiety) 6 (mild anxiety)   Total Score 8  9  6  5  7  7  6        Patient-reported     CAGE-AID:       6/6/2017    12:05 PM 5/12/2022     2:26 PM   CAGE-AID Total Score   Total Score  0    0   Total Score MyChart  0 (A total score of 2 or greater is considered clinically significant)       Information is confidential and restricted. Go to Review Flowsheets to unlock data.     PROMIS 10-Global Health (only subscores and total score):       6/9/2024     8:48 PM 6/23/2024     8:01 PM 9/23/2024     9:33 AM 10/8/2024     4:11 PM 12/28/2024     3:39 PM 4/2/2025     1:09 PM 4/6/2025     1:17 PM   PROMIS-10 Scores Only   Global Mental Health Score 17 17    17 17    17    17 18 12  11  13    Global Physical Health Score 19 19    19 18    18    18 17 12  15  14    PROMIS TOTAL - SUBSCORES 36 36    36 35    35    35 35 24  26  27        Patient-reported     [place] Suicide Severity Rating Scale (Lifetime/Recent)      5/12/2022    10:07 PM 9/27/2022     2:16 PM 2/28/2024    10:11 AM   [place] Suicide Severity Rating (Lifetime/Recent)   1. Wish to be Dead (Lifetime) N Y Y   Wish to be Dead Description (Lifetime)  fleeting thought of wishing she were dead    1. Wish to be Dead (Past 1 Month)  N N   2. Non-Specific Active Suicidal Thoughts (Lifetime) N N N   Most Severe Ideation Rating (Lifetime)  1 1   Most Severe Ideation Rating (Past 1 Month)  1 1   Frequency (Lifetime)  1 1   Frequency (Past 1 Month)  1    Duration (Lifetime)  1 1   Duration (Past 1 Month)  1     Controllability (Lifetime)  1    Controllability (Past 1 Month)  1    Deterrents (Lifetime)  1    Deterrents (Past 1 Month)  1    Reasons for Ideation (Lifetime)  4 5   Reasons for Ideation (Past 1 Month)  4 0   Actual Attempt (Lifetime) N N N   Has subject engaged in non-suicidal self-injurious behavior? (Lifetime) N N N   Interrupted Attempts (Lifetime) N N N   Aborted or Self-Interrupted Attempt (Lifetime) N N N   Preparatory Acts or Behavior (Lifetime) N N N   Calculated C-SSRS Risk Score (Lifetime/Recent) No Risk Indicated No Risk Indicated No Risk Indicated         ASSESSMENT: Current Emotional / Mental Status (status of significant symptoms):   Risk status (Self / Other harm or suicidal ideation)   Patient denies current fears or concerns for personal safety.   Patient denies current or recent suicidal ideation or behaviors.   Patient denies current or recent homicidal ideation or behaviors.   Patient denies current or recent self injurious behavior or ideation.   Patient denies other safety concerns.   Patient reports there has been no change in risk factors since their last session.     Patient reports there has been no change in protective factors since their last session.     Recommended that patient call 911 or go to the local ED should there be a change in any of these risk factors     Appearance:   Appropriate    Eye Contact:   Good    Psychomotor Behavior: Normal  Restless    Attitude:   Cooperative    Orientation:   All   Speech    Rate / Production: Impoverished  Normal     Volume:  Normal    Mood:    Anxious  Normal   Affect:    Appropriate    Thought Content:  Clear    Thought Form:  Coherent  Logical  Circumstantial   Insight:    Fair      Medication Review:   Changes to psychiatric medications, see updated Medication List in EPIC.  move off Venlafaxine complete     Medication Compliance:   Yes     Changes in Health Issues:   None reported     Chemical Use Review:   Substance Use: Chemical use  reviewed, no active concerns identified      Tobacco Use: No current tobacco use.      Diagnosis:  1. Generalized anxiety disorder        Collateral Reports Completed:   Not Applicable    PLAN: (Patient Tasks / Therapist Tasks / Other)  Continue  mindfulness grounding practices, GLAD gratitude, sleep hygiene, notice anxiety triggers, leaves on a stream, little or big deal. Check the facts, radical acceptance, use mindfulness, body scan, am meditation, panic skills    DINESH Campbell  4/21/25                                                            Individual Treatment Plan    Patient's Name: Jolynn Haji  YOB: 1955    Date of Creation: 3/325  Date Treatment Plan Last Reviewed/Revised:     DSM5 Diagnoses: 300.02 (F41.1) Generalized Anxiety Disorder  Psychosocial / Contextual Factors: aging, panic  PROMIS (reviewed every 90 days): see above    Referral / Collaboration:  Referral to another professional/service is not indicated at this time..    Anticipated number of session for this episode of care:  tbd  Anticipation frequency of session: Every other week  Anticipated Duration of each session: 38-52 minutes  Treatment plan will be reviewed in 90 days or when goals have been changed.       MeasurableTreatment Goal(s) related to diagnosis / functional impairment(s)  Goal 1: Patient will learn and practice skills to address anxiety    I will know I've met my goal when tbc.      Objective #A (Patient Action)    Patient will  : .  Sleep hygiene  Grounding skills, mindfulness  Gratitude practice  Breathing skills  Anxiety triggers  Panic psychoed    Status: New - Date: 3/3/25      Intervention(s)  Therapist will teach  sleep hygiene, grounding skills, mindfulness, gratitude practices and assign homework .      Patient has reviewed and agreed to the above plan.      DINESH Campbell  March 3, 2025                                            ______________________________________________________________________

## 2025-04-24 ENCOUNTER — OFFICE VISIT (OUTPATIENT)
Dept: DERMATOLOGY | Facility: CLINIC | Age: 70
End: 2025-04-24
Payer: COMMERCIAL

## 2025-04-24 DIAGNOSIS — D04.62 SQUAMOUS CELL CARCINOMA IN SITU (SCCIS) OF SKIN OF LEFT FOREARM: Primary | ICD-10-CM

## 2025-04-24 DIAGNOSIS — C44.619 BASAL CELL CARCINOMA (BCC) OF LEFT UPPER ARM: ICD-10-CM

## 2025-04-24 NOTE — PATIENT INSTRUCTIONS
Open Wound Care     For left arm wounds      No strenuous activity for 48 hours    Take Tylenol as needed for discomfort.                                                .         Do not drink alcoholic beverages for 48 hours.    Keep the pressure bandage in place for 24 hours. If the bandage becomes blood tinged or loose, reinforce it with gauze and tape.        (Refer to the reverse side of this page for management of bleeding).    Remove bandage in 24 hours and begin wound care as follows:     Clean area with tap water using a Q tip or gauze pad, (shower / bathe normally)  Dry wound with Q tip or gauze pad  Apply Aquaphor, Vaseline, Polysporin or Bacitracin Ointment with a Q tip  Do NOT use Neosporin Ointment *  Cover the wound with a band-aid or nonstick gauze pad and paper tape.  Repeat wound care once a day until wound is completely healed.    It is an old wives tale that a wound heals better when it is exposed to air and allowed to dry out. The wound will heal faster with a better cosmetic result if it is kept moist with ointment and covered with a bandage.  Do not let the wound dry out.      Supplies Needed:                Qtips or gauze pads                Polysporin or Bacitracin Ointment                Bandaids or nonstick gauze pads and paper tape    Wound care kits and brown paper tape are available for purchase at   the pharmacy.    BLEEDING:    Use tightly rolled up gauze or cloth to apply direct pressure over the bandage for 20   minutes.  Reapply pressure for an additional 20 minutes if necessary  Call the office or go to the nearest emergency room if pressure fails to stop the bleeding.  Use additional gauze and tape to maintain pressure once the bleeding has stopped.  Begin wound care 24 hours after surgery as directed.                  WOUND HEALING    One week after surgery a pink / red halo will form around the outside of the wound.   This is new skin.  The center of the wound will appear  yellowish white and produce some drainage.  The pink halo will slowly migrate in toward the center of the wound until the wound is covered with new shiny pink skin.  There will be no more drainage when the wound is completely healed.  It will take six months to one year for the redness to fade.  The scar may be itchy, tight and sensitive to extreme temperatures for a year after the surgery.  Massaging the area several times a day for several minutes after the wound is completely healed will help the scar soften and normalize faster. Begin massage only after healing is complete.      In case of emergency call: Dr Rogers: [phone]    [facility]: [phone]    [facility]:[phone]

## 2025-04-24 NOTE — PROGRESS NOTES
Jolynn Haji is an extremely pleasant 69 year old year old female patient here today for evaluation and managment of squamous cell carcinoma in situ and basal cell carcinoma.  Patient has no other skin complaints today.  Remainder of the HPI, Meds, PMH, Allergies, FH, and SH was reviewed in chart.      Past Medical History:   Diagnosis Date    Abnormal glandular Papanicolaou smear of cervix- ASCUS in 2003    ASC H- 2000    Basal cell carcinoma of leg, right 2016    Dr Rogers    Colon polyps 2018    tubular adenoam - due 5 yrs - 2 mm    Concussion without loss of consciousness 2017    Hidradenitis     left  groin    HSIL (high grade squamous intraepithelial lesion) on Pap smear of cervix 2000    Normal paps from  to /    Hypertension goal BP (blood pressure) < 140/90     OA (osteoarthritis) of knee     moderate    Postmenopausal since 2008     Shingles 2010    left     Squamous cell carcinoma of skin, unspecified     Synovial cyst of popliteal space        Past Surgical History:   Procedure Laterality Date    COLONOSCOPY  2018    polyps, tubular - 2 mm - due 5 yrs    COLONOSCOPY N/A 2023    Procedure: Colonoscopy;  Surgeon: Yogesh Cedeño MD;  Location: [facility] NONSPECIFIC PROCEDURE      Colposcopy        Family History   Problem Relation Age of Onset    Thyroid Disease Mother         Graves disease    Hypertension Mother     Diabetes Mother         type 2    Blood Disease Mother          from leukemia at age 78    Heart Disease Father 62        MV problem with CHF  at 62/ from MI    Hypertension Father     Diabetes Son         Juvenile Diabetes    Diabetes Daughter         Juvenile Diabetes    Cancer Maternal Uncle         type ??    Cancer Maternal Uncle         liver    Colon Cancer No family hx of        Social History     Socioeconomic History    Marital status:      Spouse name: Zach    Number of children: 2    Years of  education: 16    Highest education level: Not on file   Occupational History    Occupation: teacher first grade -retired at age 60     Comment: [employer]     Employer: [employer]   Tobacco Use    Smoking status: Never     Passive exposure: Never    Smokeless tobacco: Never   Vaping Use    Vaping status: Never Used   Substance and Sexual Activity    Alcohol use: Yes     Alcohol/week: 0.0 - 1.0 standard drinks of alcohol     Comment: 2 per month    Drug use: No     Comment: no herbal meds either    Sexual activity: Yes     Partners: Male     Birth control/protection: Surgical     Comment: -was on DepoProvera since 2000-2/2008 -  had vasectomy   Other Topics Concern     Service No    Blood Transfusions No    Caffeine Concern No    Occupational Exposure No    Hobby Hazards No    Sleep Concern No    Stress Concern No    Weight Concern No    Special Diet No    Back Care No    Exercise Yes     Comment: regular     Bike Helmet No    Seat Belt Yes     Comment: always    Self-Exams Yes     Comment: SBE  encouraged monthly    Parent/sibling w/ CABG, MI or angioplasty before 65F 55M? No   Social History Narrative    [place] - [place] Cocker Braydon - going to be 6 yrs old spring 2021---needs walks every day. --Helga Ibarra MD      January 13, 2021      Social Drivers of Health     Financial Resource Strain: Low Risk  (1/31/2025)    Financial Resource Strain     Within the past 12 months, have you or your family members you live with been unable to get utilities (heat, electricity) when it was really needed?: No   Food Insecurity: Low Risk  (1/31/2025)    Food Insecurity     Within the past 12 months, did you worry that your food would run out before you got money to buy more?: No     Within the past 12 months, did the food you bought just not last and you didn t have money to get more?: No   Transportation Needs: Low Risk  (1/31/2025)    Transportation Needs      Within the past 12 months, has lack of transportation kept you from medical appointments, getting your medicines, non-medical meetings or appointments, work, or from getting things that you need?: No   Physical Activity: Insufficiently Active (1/31/2025)    Exercise Vital Sign     Days of Exercise per Week: 2 days     Minutes of Exercise per Session: 30 min   Stress: Stress Concern Present (1/31/2025)    [employer] [place] [employer] - Occupational Stress Questionnaire     Feeling of Stress : Very much   Social Connections: Unknown (1/31/2025)    Social Connection and Isolation Panel [NHANES]     Frequency of Communication with Friends and Family: Not on file     Frequency of Social Gatherings with Friends and Family: Twice a week     Attends Taoism Services: Not on file     Active Member of Clubs or Organizations: Not on file     Attends Club or Organization Meetings: Not on file     Marital Status: Not on file   Interpersonal Safety: Low Risk  (2/6/2025)    Interpersonal Safety     Do you feel physically and emotionally safe where you currently live?: Yes     Within the past 12 months, have you been hit, slapped, kicked or otherwise physically hurt by someone?: No     Within the past 12 months, have you been humiliated or emotionally abused in other ways by your partner or ex-partner?: No   Housing Stability: Low Risk  (1/31/2025)    Housing Stability     Do you have housing? : Yes     Are you worried about losing your housing?: No       Outpatient Encounter Medications as of 4/24/2025   Medication Sig Dispense Refill    clindamycin (CLEOCIN T) 1 % external lotion Apply topically 2 times daily. For acne flares 60 mL 3    lactobacillus rhamnosus, GG, (CULTURELL) capsule Take 1 capsule by mouth daily Taking every other day      lisinopril (ZESTRIL) 2.5 MG tablet Take 1 tablet (2.5 mg) by mouth daily as needed (BP >135/85). 90 tablet 3    propranolol ER (INDERAL LA) 60 MG 24 hr capsule TAKE ONE CAPSULE  BY MOUTH ONCE DAILY 90 capsule 3    sertraline (ZOLOFT) 100 MG tablet Take 1 tablet (100 mg) by mouth daily. Take WITH 25 mg tab for total daily dose 125 mg. 90 tablet 1    sertraline (ZOLOFT) 25 MG tablet Take 1 tablet (25 mg) by mouth daily. Take WITH 100 mg tab for total daily dose 125 mg. 90 tablet 1    traZODone (DESYREL) 50 MG tablet Take 0.5-1 tablets (25-50 mg) by mouth nightly as needed for sleep. 90 tablet 1     Facility-Administered Encounter Medications as of 4/24/2025   Medication Dose Route Frequency Provider Last Rate Last Admin    lidocaine 1 % 4 mL  4 mL Other Once         lidocaine 1 % 4 mL  4 mL INTRA-ARTICULAR Once Helga Ibarra MD                 O:   NAD, WDWN, Alert & Oriented, Mood & Affect wnl, Vitals stable   General appearance normal   Vitals stable   Alert, oriented and in no acute distress     L forearm 6mm scaly papule   L arm 6mm scaly papule       Eyes: Conjunctivae/lids:Normal     ENT: Lips, mucosa: normal    MSK:Normal    Cardiovascular: peripheral edema none    Pulm: Breathing Normal    Neuro/Psych: Orientation:Alert and Orientedx3 ; Mood/Affect:normal       MICRO:   L arm (green red):Unremarkable epidermis, dermis and superficial subcutis.  No concerning areas for malignancy.     A/P:  L forearm squamous cell carcinoma in situ   MOHS:   Size    The rationale for Mohs surgery was discussed with the patient and consent was obtained.  The risks and benefits as well as alternatives to therapy were discussed, in detail.  Specifically, the risks of infection, scarring, bleeding, prolonged wound healing, incomplete removal, allergy to anesthesia, nerve injury and recurrence were addressed.  Indication for Mohs was Size. Prior to the procedure, the treatment site was clearly identified and, if available, confirmed with previous photos and confirmed by the patient   All components of the Universal Protocol/PAUSE rule were completed.  The Mohs surgeon operated in two distinct and  integrated capacities as the surgeon and pathologist.      The area was prepped with Betasept.  A rim of normal appearing skin was marked circumferentially around the lesion.  The area was infiltrated with local anesthesia.  The tumor was first debulked to remove all clinically apparent tumor.  An incision following the standard Mohs approach was done and the specimen was oriented,mapped and placed in 1 block(s).  Each specimen was then chromacoded and processed in the Mohs laboratory using standard Mohs technique and submitted for frozen section histology.  Frozen section analysis showed no residual tumor but CLEAR MARGINS.      The tumor was excised using standard Mohs technique in 1 stages(s).  CLEAR MARGINS OBTAINED and Final defect size was 1.1 cm.     We discussed the options for wound management in full with the patient including risks/benefits/ possible outcomes.      REPAIR SECOND INTENT: We discussed the options for wound management in full with the patient including risks/benefits/possible outcomes. Decision made to allow the wound to heal by second intention. Cautery was used for for hemostasis. EBL minimal; complications none; wound care routine.  The patient was discharged in good condition and will return in one month or prn for wound evaluation.  2. L arm basal cell carcinoma   EXCISION OF BASAL CELL CARCINOMA, Margins confirmed with FROZEN SECTIONS AND Second intent: After thorough discussion of PGACAC, consent obtained, anesthesia and prep, the margins of the lesion were identified and an incision was made encompassing the lesion with 3mm margin. The incisions were made through the skin and down to and including the superficial subcutis.  The lesion was removed en bloc and submitted for frozen section pathologic review. Clear margins obtained (1.2cm).    REPAIR SECOND INTENT: We discussed the options for wound management in full with the patient including risks/benefits/possible outcomes. Decision  made to allow the wound to heal by second intention. EBL minimal; complications none; wound care routine.  The patient was discharged in good condition and will return in one month or prn for wound evaluation.   It was a pleasure speaking to Jolynn Haji today.  Previous clinic notes and pertinent laboratory tests were reviewed prior to Jolynn Haji's visit.  Patient encouraged to perform monthly skin exams.  UV precautions reviewed with patient.  Risks of non-melanoma skin cancer discussed with patient   Return to clinic next appt

## 2025-04-24 NOTE — LETTER
2025      Jolynn Haji  [address] [facility] [place] [address]      Dear Colleague,    Thank you for referring your patient, Jolynn Haji, to the [facility]. Please see a copy of my visit note below.    Surgical Office Location:  [facility]  [address] [place] [id number]      Jolynn Haji is an extremely pleasant 69 year old year old female patient here today for evaluation and managment of squamous cell carcinoma in situ and basal cell carcinoma.  Patient has no other skin complaints today.  Remainder of the HPI, Meds, PMH, Allergies, FH, and SH was reviewed in chart.      Past Medical History:   Diagnosis Date     Abnormal glandular Papanicolaou smear of cervix- ASCUS in 2003    ASC H-      Basal cell carcinoma of leg, right 2016    Dr Rogers     Colon polyps 2018    tubular adenoam - due 5 yrs - 2 mm     Concussion without loss of consciousness 2017     Hidradenitis     left  groin     HSIL (high grade squamous intraepithelial lesion) on Pap smear of cervix 2000    Normal paps from  to /     Hypertension goal BP (blood pressure) < 140/90      OA (osteoarthritis) of knee     moderate     Postmenopausal since 2008      Shingles 2010    left      Squamous cell carcinoma of skin, unspecified      Synovial cyst of popliteal space        Past Surgical History:   Procedure Laterality Date     COLONOSCOPY  2018    polyps, tubular - 2 mm - due 5 yrs     COLONOSCOPY N/A 2023    Procedure: Colonoscopy;  Surgeon: Yogesh Cedeño MD;  [facility]: [facility] NONSPECIFIC PROCEDURE      Colposcopy        Family History   Problem Relation Age of Onset     Thyroid Disease Mother         Graves disease     Hypertension Mother      Diabetes Mother         type 2     Blood Disease Mother          from leukemia at age 78     Heart Disease Father 62        MV problem with CHF  at 62/ from  MI     Hypertension Father      Diabetes Son         Juvenile Diabetes     Diabetes Daughter         Juvenile Diabetes     Cancer Maternal Uncle         type ??     Cancer Maternal Uncle         liver     Colon Cancer No family hx of        Social History     Socioeconomic History     Marital status:      Spouse name: Zach     Number of children: 2     Years of education: 16     Highest education level: Not on file   Occupational History     Occupation: teacher first grade -retired at age 60     Comment: [employer]     Employer: [employer]   Tobacco Use     Smoking status: Never     Passive exposure: Never     Smokeless tobacco: Never   Vaping Use     Vaping status: Never Used   Substance and Sexual Activity     Alcohol use: Yes     Alcohol/week: 0.0 - 1.0 standard drinks of alcohol     Comment: 2 per month     Drug use: No     Comment: no herbal meds either     Sexual activity: Yes     Partners: Male     Birth control/protection: Surgical     Comment: -was on DepoProvera since 2000-2/2008 -  had vasectomy   Other Topics Concern      Service No     Blood Transfusions No     Caffeine Concern No     Occupational Exposure No     Hobby Hazards No     Sleep Concern No     Stress Concern No     Weight Concern No     Special Diet No     Back Care No     Exercise Yes     Comment: regular      Bike Helmet No     Seat Belt Yes     Comment: always     Self-Exams Yes     Comment: SBE  encouraged monthly     Parent/sibling w/ CABG, MI or angioplasty before 65F 55M? No   Social History Narrative    [place] - [place] Cocker Spaniel - going to be 6 yrs old spring 2021---needs walks every day. --Helga Ibarra MD      January 13, 2021      Social Drivers of Health     Financial Resource Strain: Low Risk  (1/31/2025)    Financial Resource Strain      Within the past 12 months, have you or your family members you live with been unable to get utilities (heat,  electricity) when it was really needed?: No   Food Insecurity: Low Risk  (1/31/2025)    Food Insecurity      Within the past 12 months, did you worry that your food would run out before you got money to buy more?: No      Within the past 12 months, did the food you bought just not last and you didn t have money to get more?: No   Transportation Needs: Low Risk  (1/31/2025)    Transportation Needs      Within the past 12 months, has lack of transportation kept you from medical appointments, getting your medicines, non-medical meetings or appointments, work, or from getting things that you need?: No   Physical Activity: Insufficiently Active (1/31/2025)    Exercise Vital Sign      Days of Exercise per Week: 2 days      Minutes of Exercise per Session: 30 min   Stress: Stress Concern Present (1/31/2025)    [employer] [employer] - Occupational Stress Questionnaire      Feeling of Stress : Very much   Social Connections: Unknown (1/31/2025)    Social Connection and Isolation Panel [NHANES]      Frequency of Communication with Friends and Family: Not on file      Frequency of Social Gatherings with Friends and Family: Twice a week      Attends Bahai Services: Not on file      Active Member of Clubs or Organizations: Not on file      Attends Club or Organization Meetings: Not on file      Marital Status: Not on file   Interpersonal Safety: Low Risk  (2/6/2025)    Interpersonal Safety      Do you feel physically and emotionally safe where you currently live?: Yes      Within the past 12 months, have you been hit, slapped, kicked or otherwise physically hurt by someone?: No      Within the past 12 months, have you been humiliated or emotionally abused in other ways by your partner or ex-partner?: No   Housing Stability: Low Risk  (1/31/2025)    Housing Stability      Do you have housing? : Yes      Are you worried about losing your housing?: No       Outpatient Encounter Medications as of 4/24/2025    Medication Sig Dispense Refill     clindamycin (CLEOCIN T) 1 % external lotion Apply topically 2 times daily. For acne flares 60 mL 3     lactobacillus rhamnosus, GG, (CULTURELL) capsule Take 1 capsule by mouth daily Taking every other day       lisinopril (ZESTRIL) 2.5 MG tablet Take 1 tablet (2.5 mg) by mouth daily as needed (BP >135/85). 90 tablet 3     propranolol ER (INDERAL LA) 60 MG 24 hr capsule TAKE ONE CAPSULE BY MOUTH ONCE DAILY 90 capsule 3     sertraline (ZOLOFT) 100 MG tablet Take 1 tablet (100 mg) by mouth daily. Take WITH 25 mg tab for total daily dose 125 mg. 90 tablet 1     sertraline (ZOLOFT) 25 MG tablet Take 1 tablet (25 mg) by mouth daily. Take WITH 100 mg tab for total daily dose 125 mg. 90 tablet 1     traZODone (DESYREL) 50 MG tablet Take 0.5-1 tablets (25-50 mg) by mouth nightly as needed for sleep. 90 tablet 1     Facility-Administered Encounter Medications as of 4/24/2025   Medication Dose Route Frequency Provider Last Rate Last Admin     lidocaine 1 % 4 mL  4 mL Other Once          lidocaine 1 % 4 mL  4 mL INTRA-ARTICULAR Once Helga Ibarra MD                 O:   NAD, WDWN, Alert & Oriented, Mood & Affect wnl, Vitals stable   General appearance normal   Vitals stable   Alert, oriented and in no acute distress     L forearm 6mm scaly papule   L arm 6mm scaly papule       Eyes: Conjunctivae/lids:Normal     ENT: Lips, mucosa: normal    MSK:Normal    Cardiovascular: peripheral edema none    Pulm: Breathing Normal    Neuro/Psych: Orientation:Alert and Orientedx3 ; Mood/Affect:normal       MICRO:   L arm (green red):Unremarkable epidermis, dermis and superficial subcutis.  No concerning areas for malignancy.     A/P:  L forearm squamous cell carcinoma in situ   MOHS:   Size    The rationale for Mohs surgery was discussed with the patient and consent was obtained.  The risks and benefits as well as alternatives to therapy were discussed, in detail.  Specifically, the risks of  infection, scarring, bleeding, prolonged wound healing, incomplete removal, allergy to anesthesia, nerve injury and recurrence were addressed.  Indication for Mohs was Size. Prior to the procedure, the treatment site was clearly identified and, if available, confirmed with previous photos and confirmed by the patient   All components of the Universal Protocol/PAUSE rule were completed.  The Mohs surgeon operated in two distinct and integrated capacities as the surgeon and pathologist.      The area was prepped with Betasept.  A rim of normal appearing skin was marked circumferentially around the lesion.  The area was infiltrated with local anesthesia.  The tumor was first debulked to remove all clinically apparent tumor.  An incision following the standard Mohs approach was done and the specimen was oriented,mapped and placed in 1 block(s).  Each specimen was then chromacoded and processed in the Mohs laboratory using standard Mohs technique and submitted for frozen section histology.  Frozen section analysis showed no residual tumor but CLEAR MARGINS.      The tumor was excised using standard Mohs technique in 1 stages(s).  CLEAR MARGINS OBTAINED and Final defect size was 1.1 cm.     We discussed the options for wound management in full with the patient including risks/benefits/ possible outcomes.      REPAIR SECOND INTENT: We discussed the options for wound management in full with the patient including risks/benefits/possible outcomes. Decision made to allow the wound to heal by second intention. Cautery was used for for hemostasis. EBL minimal; complications none; wound care routine.  The patient was discharged in good condition and will return in one month or prn for wound evaluation.  2. L arm basal cell carcinoma   EXCISION OF BASAL CELL CARCINOMA, Margins confirmed with FROZEN SECTIONS AND Second intent: After thorough discussion of PGACAC, consent obtained, anesthesia and prep, the margins of the lesion were  identified and an incision was made encompassing the lesion with 3mm margin. The incisions were made through the skin and down to and including the superficial subcutis.  The lesion was removed en bloc and submitted for frozen section pathologic review. Clear margins obtained (1.2cm).    REPAIR SECOND INTENT: We discussed the options for wound management in full with the patient including risks/benefits/possible outcomes. Decision made to allow the wound to heal by second intention. EBL minimal; complications none; wound care routine.  The patient was discharged in good condition and will return in one month or prn for wound evaluation.   It was a pleasure speaking to Jolynn Haji today.  Previous clinic notes and pertinent laboratory tests were reviewed prior to Jolynn Haji's visit.  Patient encouraged to perform monthly skin exams.  UV precautions reviewed with patient.  Risks of non-melanoma skin cancer discussed with patient   Return to clinic next appt      Again, thank you for allowing me to participate in the care of your patient.        Sincerely,        Sheldon Rogers MD    Electronically signed

## 2025-05-06 ENCOUNTER — VIRTUAL VISIT (OUTPATIENT)
Facility: CLINIC | Age: 70
End: 2025-05-06
Payer: COMMERCIAL

## 2025-05-06 DIAGNOSIS — F41.1 GENERALIZED ANXIETY DISORDER: Primary | ICD-10-CM

## 2025-05-06 PROCEDURE — 90834 PSYTX W PT 45 MINUTES: CPT | Mod: 95

## 2025-05-06 NOTE — PROGRESS NOTES
[facility]                                     Progress Note    Patient Name: Jolynn Haji  Date: 5/6/2025         Service Type: Individual      Session Start Time: 1:05 pm  Session End Time: 1:45 pm     Session Length: 40 min    Session #: 8 (previous therapy with Erika RAIN)    Attendees: Client attended alone    Service Modality:  Video Visit:      Provider verified identity through the following two step process.  Patient provided:  Patient address    Telemedicine Visit: The patient's condition can be safely assessed and treated via synchronous audio and visual telemedicine encounter.      Reason for Telemedicine Visit: Patient convenience (e.g. access to timely appointments / distance to available provider)    Originating Site (Patient Location): Patient's home    Distant Site (Provider Location): Provider Remote Setting- Home Office    Consent:  The patient/guardian has verbally consented to: the potential risks and benefits of telemedicine (video visit) versus in person care; bill my insurance or make self-payment for services provided; and responsibility for payment of non-covered services.     Patient would like the video invitation sent by:  My Chart    Mode of Communication:  [facility]    Distant Location (Provider):  Off-site    As the provider I attest to compliance with applicable laws and regulations related to telemedicine.    DATA  Interactive Complexity: No  Crisis: No      Progress Since Last Session (Related to Symptoms / Goals / Homework):   Symptoms: Improving anxiety and energy    Homework: Achieved / completed to satisfaction      Episode of Care Goals: Satisfactory progress - ACTION (Actively working towards change); Intervened by reinforcing change plan / affirming steps taken     Current / Ongoing Stressors and Concerns:  Meds increase/change, seeing improvement.  Irritability when feeling out of control. MRI/CAT scan inconclusive; neurologist suggests  focus on addressing anxiety/depression. Off Venlafaxene. Current functioning, cognitive questions, overwhelm. Tired of feeling anxious     Treatment Objective(s) Addressed in This Session:   Sleep hygiene  Grounding skills, mindfulness  Gratitude practice  Anxiety triggers  Panic psychoed     Intervention:  Interpersonal Therapy: provided active listening and validation of experience with stressors. Utilized rapport building, reinforced grounding skills, sleep hygiene strategies, GLAD gratitude practice , Leaves on a Stream mindfulness practice, panic, mindfulness based stress reduction, progressive muscle relaxation. Introduced NURIS communication skill, practiced in session.  DBT: Radical acceptance and Check the Facts psychoeducation reinforcement.    Assessments completed prior to visit:  The following assessments were completed by patient for this visit:  PHQ2:       5/3/2023     1:18 PM 3/21/2023    11:20 AM 2/23/2023     7:15 PM 1/31/2023     3:21 PM 1/12/2023     4:28 PM 12/15/2022     9:12 PM 12/8/2022     9:38 AM   PHQ-2 ( 1999 [employer])   Q1: Little interest or pleasure in doing things   0 0 0  0   Q2: Feeling down, depressed or hopeless   0 0 0  0   PHQ-2 Score   0 0 0  0   Q1: Little interest or pleasure in doing things   Not at all Not at all Not at all  Not at all   Q2: Feeling down, depressed or hopeless   Not at all Not at all Not at all  Not at all   PHQ-2 Score Incomplete Incomplete 0 0 0 Incomplete 0     PHQ9:       1/1/2025    11:51 AM 1/8/2025    11:26 AM 2/5/2025    11:26 AM 3/1/2025     4:54 PM 4/6/2025     1:01 PM 4/10/2025     8:43 AM 4/13/2025     5:39 PM   PHQ-9 SCORE   PHQ-9 Total Score MyChart 8 (Mild depression) 11 (Moderate depression) 7 (Mild depression) 6 (Mild depression) 9 (Mild depression) 12 (Moderate depression) 4 (Minimal depression)   PHQ-9 Total Score 8  11  7  6  9  12  4        Patient-reported     GAD2:       11/18/2024     9:17 AM 12/2/2024     5:19 PM 12/28/2024      3:34 PM 1/28/2025     3:51 PM 2/18/2025     3:52 PM 3/2/2025    12:35 PM 4/6/2025     1:06 PM   TIFFANY-2   Feeling nervous, anxious, or on edge 1 2 3 3 2 3 3   Not being able to stop or control worrying 0 1 2 1 2 1 1   TIFFANY-2 Total Score 1  3  5  4  4  4  4        Patient-reported     GAD7:       12/17/2024     2:25 PM 1/6/2025     8:49 AM 1/8/2025    11:28 AM 1/28/2025     3:51 PM 2/18/2025     3:52 PM 2/28/2025     8:30 PM 4/6/2025     1:06 PM   TIFFANY-7 SCORE   Total Score 8 (mild anxiety) 9 (mild anxiety) 6 (mild anxiety) 5 (mild anxiety) 7 (mild anxiety) 7 (mild anxiety) 6 (mild anxiety)   Total Score 8  9  6  5  7  7  6        Patient-reported     CAGE-AID:       6/6/2017    12:05 PM 5/12/2022     2:26 PM   CAGE-AID Total Score   Total Score  0    0   Total Score MyChart  0 (A total score of 2 or greater is considered clinically significant)       Information is confidential and restricted. Go to Review Flowsheets to unlock data.     PROMIS 10-Global Health (only subscores and total score):       6/9/2024     8:48 PM 6/23/2024     8:01 PM 9/23/2024     9:33 AM 10/8/2024     4:11 PM 12/28/2024     3:39 PM 4/2/2025     1:09 PM 4/6/2025     1:17 PM   PROMIS-10 Scores Only   Global Mental Health Score 17 17    17 17    17    17 18 12  11  13    Global Physical Health Score 19 19    19 18    18    18 17 12  15  14    PROMIS TOTAL - SUBSCORES 36 36    36 35    35    35 35 24  26  27        Patient-reported     [place] Suicide Severity Rating Scale (Lifetime/Recent)      5/12/2022    10:07 PM 9/27/2022     2:16 PM 2/28/2024    10:11 AM   [place] Suicide Severity Rating (Lifetime/Recent)   1. Wish to be Dead (Lifetime) N Y Y   Wish to be Dead Description (Lifetime)  fleeting thought of wishing she were dead    1. Wish to be Dead (Past 1 Month)  N N   2. Non-Specific Active Suicidal Thoughts (Lifetime) N N N   Most Severe Ideation Rating (Lifetime)  1 1   Most Severe Ideation Rating (Past 1 Month)  1 1   Frequency  (Lifetime)  1 1   Frequency (Past 1 Month)  1    Duration (Lifetime)  1 1   Duration (Past 1 Month)  1    Controllability (Lifetime)  1    Controllability (Past 1 Month)  1    Deterrents (Lifetime)  1    Deterrents (Past 1 Month)  1    Reasons for Ideation (Lifetime)  4 5   Reasons for Ideation (Past 1 Month)  4 0   Actual Attempt (Lifetime) N N N   Has subject engaged in non-suicidal self-injurious behavior? (Lifetime) N N N   Interrupted Attempts (Lifetime) N N N   Aborted or Self-Interrupted Attempt (Lifetime) N N N   Preparatory Acts or Behavior (Lifetime) N N N   Calculated C-SSRS Risk Score (Lifetime/Recent) No Risk Indicated No Risk Indicated No Risk Indicated         ASSESSMENT: Current Emotional / Mental Status (status of significant symptoms):   Risk status (Self / Other harm or suicidal ideation)   Patient denies current fears or concerns for personal safety.   Patient denies current or recent suicidal ideation or behaviors.   Patient denies current or recent homicidal ideation or behaviors.   Patient denies current or recent self injurious behavior or ideation.   Patient denies other safety concerns.   Patient reports there has been no change in risk factors since their last session.     Patient reports there has been no change in protective factors since their last session.     Recommended that patient call 911 or go to the local ED should there be a change in any of these risk factors     Appearance:   Appropriate    Eye Contact:   Good    Psychomotor Behavior: Normal  Restless    Attitude:   Cooperative    Orientation:   All   Speech    Rate / Production: Impoverished  Normal     Volume:  Normal    Mood:    Anxious  Normal   Affect:    Appropriate    Thought Content:  Clear    Thought Form:  Coherent  Logical  Circumstantial   Insight:    Fair      Medication Review:   Changes to psychiatric medications, see updated Medication List in EPIC.  move off Venlafaxine complete     Medication  Compliance:   Yes     Changes in Health Issues:   None reported     Chemical Use Review:   Substance Use: Chemical use reviewed, no active concerns identified      Tobacco Use: No current tobacco use.      Diagnosis:  1. Generalized anxiety disorder        Collateral Reports Completed:   Not Applicable    PLAN: (Patient Tasks / Therapist Tasks / Other)  Continue  mindfulness grounding practices, GLAD gratitude, sleep hygiene, notice anxiety triggers, leaves on a stream, little or big deal. Check the facts, radical acceptance, use mindfulness, body scan, am meditation, panic skills    DINESH Campbell  5/6/25                                                            Individual Treatment Plan    Patient's Name: Jolynn Haji  YOB: 1955    Date of Creation: 3/325  Date Treatment Plan Last Reviewed/Revised:     DSM5 Diagnoses: 300.02 (F41.1) Generalized Anxiety Disorder  Psychosocial / Contextual Factors: aging, panic  PROMIS (reviewed every 90 days): see above    Referral / Collaboration:  Referral to another professional/service is not indicated at this time..    Anticipated number of session for this episode of care:  tbd  Anticipation frequency of session: Every other week  Anticipated Duration of each session: 38-52 minutes  Treatment plan will be reviewed in 90 days or when goals have been changed.       MeasurableTreatment Goal(s) related to diagnosis / functional impairment(s)  Goal 1: Patient will learn and practice skills to address anxiety    I will know I've met my goal when tbc.      Objective #A (Patient Action)    Patient will  : .  Sleep hygiene  Grounding skills, mindfulness  Gratitude practice  Breathing skills  Anxiety triggers  Panic psychoed    Status: New - Date: 3/3/25      Intervention(s)  Therapist will teach  sleep hygiene, grounding skills, mindfulness, gratitude practices and assign homework .      Patient has reviewed and agreed to the above plan.      Mica Tang  LICSW  March 3, 2025                                           ______________________________________________________________________

## 2025-05-08 ASSESSMENT — ANXIETY QUESTIONNAIRES
5. BEING SO RESTLESS THAT IT IS HARD TO SIT STILL: NOT AT ALL
3. WORRYING TOO MUCH ABOUT DIFFERENT THINGS: NOT AT ALL
4. TROUBLE RELAXING: NOT AT ALL
1. FEELING NERVOUS, ANXIOUS, OR ON EDGE: NEARLY EVERY DAY
1. FEELING NERVOUS, ANXIOUS, OR ON EDGE: NEARLY EVERY DAY
IF YOU CHECKED OFF ANY PROBLEMS ON THIS QUESTIONNAIRE, HOW DIFFICULT HAVE THESE PROBLEMS MADE IT FOR YOU TO DO YOUR WORK, TAKE CARE OF THINGS AT HOME, OR GET ALONG WITH OTHER PEOPLE: SOMEWHAT DIFFICULT
GAD7 TOTAL SCORE: 3
6. BECOMING EASILY ANNOYED OR IRRITABLE: NOT AT ALL
7. FEELING AFRAID AS IF SOMETHING AWFUL MIGHT HAPPEN: NOT AT ALL
2. NOT BEING ABLE TO STOP OR CONTROL WORRYING: NOT AT ALL
5. BEING SO RESTLESS THAT IT IS HARD TO SIT STILL: NOT AT ALL
6. BECOMING EASILY ANNOYED OR IRRITABLE: NOT AT ALL
IF YOU CHECKED OFF ANY PROBLEMS ON THIS QUESTIONNAIRE, HOW DIFFICULT HAVE THESE PROBLEMS MADE IT FOR YOU TO DO YOUR WORK, TAKE CARE OF THINGS AT HOME, OR GET ALONG WITH OTHER PEOPLE: SOMEWHAT DIFFICULT
GAD7 TOTAL SCORE: 3
3. WORRYING TOO MUCH ABOUT DIFFERENT THINGS: NOT AT ALL
7. FEELING AFRAID AS IF SOMETHING AWFUL MIGHT HAPPEN: NOT AT ALL
4. TROUBLE RELAXING: NOT AT ALL
8. IF YOU CHECKED OFF ANY PROBLEMS, HOW DIFFICULT HAVE THESE MADE IT FOR YOU TO DO YOUR WORK, TAKE CARE OF THINGS AT HOME, OR GET ALONG WITH OTHER PEOPLE?: SOMEWHAT DIFFICULT
GAD7 TOTAL SCORE: 3
8. IF YOU CHECKED OFF ANY PROBLEMS, HOW DIFFICULT HAVE THESE MADE IT FOR YOU TO DO YOUR WORK, TAKE CARE OF THINGS AT HOME, OR GET ALONG WITH OTHER PEOPLE?: SOMEWHAT DIFFICULT
2. NOT BEING ABLE TO STOP OR CONTROL WORRYING: NOT AT ALL
GAD7 TOTAL SCORE: 3

## 2025-05-12 NOTE — PROGRESS NOTES
[facility] Progress Note   Mental Health & Addiction Services      5/13/25    Patient Name: Jolynn Haji       Service Type:  Individual   Service Location:  boarding passhart / Email (patient reached)   Visit Start Time: 230pm  Visit End Time:  258pm   Session Length: 16 - 37    Attendees: Client   Service Modality: Video Visit:      Provider verified identity through the following two step process.  Patient provided:  Patient is known previously to provider    Telemedicine Visit: The patient's condition can be safely assessed and treated via synchronous audio and visual telemedicine encounter.      Reason for Telemedicine Visit: Services only offered telehealth    Originating Site (Patient Location): Patient's home    Distant Site (Provider Location): Provider Remote Setting- Home Office    Consent:  The patient/guardian has verbally consented to: the potential risks and benefits of telemedicine (video visit) versus in person care; bill my insurance or make self-payment for services provided; and responsibility for payment of non-covered services.     Patient would like the video invitation sent by:  My Chart    Mode of Communication:  [facility]    Distant Location (Provider):  Off-site    As the provider I attest to compliance with applicable laws and regulations related to telemedicine.   Visit number: 10    [facility] Visit Activities (Refresh list every visit): [facility] [employer]     [place] Diagnostic Assessment: 2/28/24 Mica George MA [facility]  Treatment Plan Review Date: 4/11/25      DATA:    Extended Session (60+ minutes): No   Interactive Complexity: No   Crisis: No   [facility] Patient: No     Assessments completed prior to visit:   PHQ9:       1/1/2025    11:51 AM 1/8/2025    11:26 AM 2/5/2025    11:26 AM 3/1/2025     4:54 PM 4/6/2025     1:01 PM 4/10/2025     8:43 AM 4/13/2025     5:39 PM   PHQ-9 SCORE   PHQ-9 Total Score  MyChart 8 (Mild depression) 11 (Moderate depression) 7 (Mild depression) 6 (Mild depression) 9 (Mild depression) 12 (Moderate depression) 4 (Minimal depression)   PHQ-9 Total Score 8  11  7  6  9  12  4        Patient-reported     GAD7:       1/6/2025     8:49 AM 1/8/2025    11:28 AM 1/28/2025     3:51 PM 2/18/2025     3:52 PM 2/28/2025     8:30 PM 4/6/2025     1:06 PM 5/8/2025     4:40 PM   TIFFANY-7 SCORE   Total Score 9 (mild anxiety) 6 (mild anxiety) 5 (mild anxiety) 7 (mild anxiety) 7 (mild anxiety) 6 (mild anxiety) 3 (minimal anxiety)   Total Score 9  6  5  7  7  6  3        Patient-reported       Reason for Visit/Presenting Concern:  Anxiety    Current Stressors / Issues:  MH update:  Less groggy.  Anxiety and mood about the same.  Son is getting . He is proposing on Friday.  Stresses:  thinking about moving- mobility/health reasons- going to meet with a relator to discuss options.  Has yet to make it to the cabin.  Appetite: n/a  Sleep:  not taking trazodone, stopped about a month ago.  Not needed.  Sleeping well.  Wakes up to urinate, but getting enough sleep.  Outpatient Provider updates: Mica Valero [facility]  SI/SIB/HI:  Denies n/a  Side effects/compliance:  Interventions:  [facility] engaged in conversation about anxiety mgmt and breaking tasks down  Most important:  afternoon feeling crappy often.  Headache, shallower breathing, ears plugged up, fatigue.  Feeling this way more often or not.  Fine 5am-afternoon, then not great, better after dinner to bedtime. Headaches worse.    4/11  MH update:  several panic attacks during the day, stress on the brain, fatigue after panic.  Mood is feeling better.  Saw Neuro- no further testing.  Making easter plans  Stresses: leaving the house is causing anxiety  Appetite: n/a  Sleep: Fatigue  Outpatient Provider updates: Mica Valero [facility]  SI/SIB/HI:  Denies current  Side effects/compliance:  Interventions:  [facility] reviewed [address] and anxiety skills.  Most important:   fatigue/ low energy.  Panic thought out the day    1/2   update:  Balance issues much worse.  Feels like mind is not working word finding.  Withdrawn and apprehensive of social setting.  Frustrated with self.  More isolative.  Not driving for 2 weeks.  Headaches are increased.  More bad days then good days.   Stresses:  Xmas went well, didn't enjoy  Appetite: n/a  Sleep: Fatigued,  anxiety at night, not sleeping as the best    Outpatient Provider updates: 1/15 [facility] Mica Valero  SI/SIB/HI:  passive ideation, no plan/intent.  No previous attempts.  Future and goal oriented.  Help seeking.    Side effects/compliance:  Interventions:  using calm lorena.   [facility] engaged in discussion thought patterns of anxiety and others viewing her differently with word finding.  Discussed giving self jerry.  Most important:  Changes made by PCP ANAMARIA.  Effexor increased.  Feels worse.    11/20   update:  Was changing Trazodone dose around was dosing half and 1/4 for the last 3 weeks and things feel off.  Doesn't feel like mood is as good but does feel more clear.  On going headache questions with effexor.   Stresses:  daughter is hosting thanksgiving.  Appetite: n/a  Sleep: n/a  Outpatient Provider updates: Erika MONTIEL  SI/SIB/HI: n/a  Side effects/compliance:  Interventions:  [facility] encouraged a headache log to better track information  Most important:  Wonders about genesight testing, wonders if this would be appropriate.  Trazdone dosing 1/4 or 1/2.  Wonders about effexor and headaches.     9/26   update:  having some headaches.  Went off trazodone.  Sleeping well without it.  Not as foggy.    Been off about a week.  Company at [employer].  Social [employer] events.  Grandkids back in school.  Granddaughter tough time in school/swimming.  Interested in doing more volunteer work  Stresses:  n/a  Appetite: n/a    Sleep: Doing well.  Outpatient Provider updates: Erika MONTIEL  SI/SIB/HI: n/a  Side effects/compliance:  Interventions:  [facility]  engaged in support around anxiety mgmt  Most important:  Doing well     8/26 [facility] only  VBS was successful   Attended family wedding  Friends coming to town  Discussed anxiety mgmt during transitions  Discussed path of anxiety tx     7/31 [facility] only  Staying busy birthday parties  VBS next week  Being highly social  Son might have tick bite sx- high anxiety, calling him numerous times.  Discussed catatrophization/behaviors  Discussed people pleasing     6/29   update:  Going really well.  Have on going headaches.  I feels manageable however.  Way less brain fog.    Stresses:  Had to put in new furnaces and air conditioners.   Appetite: N/a   Sleep:  N/a  Outpatient Provider updates: Erika RAIN-Mat leave temporarily Nathalie MONTIEL [facility]  SI/SIB/HI: N/a  Side effects/compliance: N/a  Interventions:  [facility] discussed anxiety mgmt and TIPP skills/decision paralysis.   Most important:  Doing well!  Warning signs: not energy, not being social, not having odessa     2/28   update:  Reduced effexor since last visit.  On going well.  Brain fog gone.  Stresses:  Headache pain is reduced.  Appetite: Denies  Sleep: Trazadone works, no problem  Outpatient Provider updates: [facility] Erika RAINObdulia  Uses CALM lorena daily with afternoon anxiety  SI/SIB/HI:  Denies past attempts.  Previous passive ideation  FARIDA:  Denies  Side effects/compliance:  N/a  Interventions:  [facility] engaged in completing DA with psychoeducation with treatment planning  Most important: Med changes are great.     Does homework and books to read.    Uses CALM lorena  5/4/3/2/1  May I skills  Journaling  Putting thoughts in box, for another day  Word lorena  Making choices can be difficult sets limits    Therapeutic Interventions:  Acceptance and Commitment Therapy (ACT): Worked with patient to identify values and the ways in which their behaviors are inconsistent with those values. Worked with patient to identify behaviors they could engage in to live in a manner that is more consistent with  their values.    Response to treatment interventions:   Patient was receptive to interventions utilized.  Patient was engaged in the therapy process.       Progress on Treatment Objective(s) / Homework:   Satisfactory progress - PREPARATION (Decided to change - considering how); Intervened by negotiating a change plan and determining options / strategies for behavior change, identifying triggers, exploring social supports, and working towards setting a date to begin behavior change     Medication Review:   Changes to psychiatric medications, see updated Medication List in EPIC.      Medication Compliance:   Yes     Chemical Use Review:  Substance Use: Chemical use reviewed, no active concerns identified      Tobacco Use: No current tobacco use.       Assessment: Current Emotional / Mental Status (status of significant symptoms):    Risk status (Self / Other harm or suicidal ideation)   Patient denies a history of suicidal ideation, suicide attempts, self-injurious behavior, homicidal ideation, homicidal behavior, and and other safety concerns   Patient denies current fears or concerns for personal safety.   Patient denies current or recent suicidal ideation or behaviors.   Patient denies current or recent homicidal ideation or behaviors.   Patient denies current or recent self injurious behavior or ideation.   Patient denies other safety concerns.   Recommended that patient call 911 or go to the local ED should there be a change in any of these risk factors      ASSESSMENT:   Mental Status:     Appearance:   Appropriate    Eye Contact:   Good    Psychomotor Behavior: Normal    Attitude:   Cooperative    Orientation:   All   Speech Rate / Production: Normal    Volume:   Normal    Mood:    Normal   Affect:    Appropriate    Thought Content:  Clear    Thought Form:  Coherent  Logical    Insight:    Good          Diagnoses:      Generalized anxiety disorder  Mood disorder    Collateral Reports Completed:   Communicated  with: Dr chatman       Plan: (Homework, other):   Patient was provided No indications of CD issues  Patient was given information about behavioral services and encouraged to schedule a follow up appointment with the clinic [facility] as needed.         Mica George [facility], [facility]     _____________________________________________________________________________________________________________________________________                                              Individual Treatment Plan    Patient's Name: Jolynn Haji   YOB: 1955  Date of Creation: 11/20/24  Date Treatment Plan Last Reviewed/Revised: 4/11/25    DSM5 Diagnoses: 300.02 (F41.1) Generalized Anxiety Disorder  Psychosocial / Contextual Factors: Interpersonal Concerns  PROMIS (reviewed every 90 days):   The following assessments were completed by patient for this visit:  PROMIS 10-Global Health (only subscores and total score):       6/9/2024     8:48 PM 6/23/2024     8:01 PM 9/23/2024     9:33 AM 10/8/2024     4:11 PM 12/28/2024     3:39 PM 4/2/2025     1:09 PM 4/6/2025     1:17 PM   PROMIS-10 Scores Only   Global Mental Health Score 17 17    17 17    17    17 18 12  11  13    Global Physical Health Score 19 19    19 18    18    18 17 12  15  14    PROMIS TOTAL - SUBSCORES 36 36    36 35    35    35 35 24  26  27        Patient-reported        Referral / Collaboration:  Referral to another professional/service is not indicated at this time..    Anticipated number of session for this episode of care:  6-9 sessions  Anticipation frequency of session: Monthly  Anticipated Duration of each session: 16-37 minutes  Treatment plan will be reviewed in 90 days or when goals have been changed.       MeasurableTreatment Goal(s) related to diagnosis / functional impairment(s)  Goal 1: Patient will learn and implement coping skills that result in a reduction of anxiety and nervousness.    I will know I've met my goal when I have reduced my anxiety.      Status:  Continued - Date(s):4/11/25     Intervention(s)  [facility] will Motivational Interviewing (MI): Validate patient's thoughts, feelings and experience. Express respect for patient's autonomy in decision making.  Ask open-ended questions to invite patient's self-reflection and self-direction around change and what is important for them in working towards their goals.      Patient has reviewed and agreed to the above plan.     Written by  Mica George [facility], [facility]

## 2025-05-13 ENCOUNTER — VIRTUAL VISIT (OUTPATIENT)
Dept: PSYCHIATRY | Facility: CLINIC | Age: 70
End: 2025-05-13
Payer: COMMERCIAL

## 2025-05-13 ENCOUNTER — VIRTUAL VISIT (OUTPATIENT)
Dept: BEHAVIORAL HEALTH | Facility: CLINIC | Age: 70
End: 2025-05-13
Payer: COMMERCIAL

## 2025-05-13 VITALS — WEIGHT: 125 LBS | BODY MASS INDEX: 19.58 KG/M2

## 2025-05-13 DIAGNOSIS — F39 MOOD DISORDER: ICD-10-CM

## 2025-05-13 DIAGNOSIS — F41.1 GAD (GENERALIZED ANXIETY DISORDER): ICD-10-CM

## 2025-05-13 DIAGNOSIS — Z79.899 ENCOUNTER FOR LONG-TERM (CURRENT) USE OF MEDICATIONS: Primary | ICD-10-CM

## 2025-05-13 DIAGNOSIS — F41.1 GENERALIZED ANXIETY DISORDER: Primary | ICD-10-CM

## 2025-05-13 DIAGNOSIS — G44.219 EPISODIC TENSION-TYPE HEADACHE, NOT INTRACTABLE: ICD-10-CM

## 2025-05-13 PROCEDURE — 98006 SYNCH AUDIO-VIDEO EST MOD 30: CPT | Performed by: PSYCHIATRY & NEUROLOGY

## 2025-05-13 PROCEDURE — 90832 PSYTX W PT 30 MINUTES: CPT | Mod: 95 | Performed by: COUNSELOR

## 2025-05-13 PROCEDURE — G2211 COMPLEX E/M VISIT ADD ON: HCPCS | Performed by: PSYCHIATRY & NEUROLOGY

## 2025-05-13 PROCEDURE — 1125F AMNT PAIN NOTED PAIN PRSNT: CPT | Performed by: PSYCHIATRY & NEUROLOGY

## 2025-05-13 RX ORDER — SERTRALINE HYDROCHLORIDE 100 MG/1
100 TABLET, FILM COATED ORAL DAILY
Qty: 90 TABLET | Refills: 1 | Status: SHIPPED | OUTPATIENT
Start: 2025-05-13

## 2025-05-13 ASSESSMENT — PAIN SCALES - GENERAL: PAINLEVEL_OUTOF10: MODERATE PAIN (5)

## 2025-05-13 NOTE — PATIENT INSTRUCTIONS
Treatment Plan:   Continue trazodone 12.5-50 mg at bedtime as needed for sleep.    Continue propranolol EXTENDED RELEASE 60 mg ONCE daily for anxiety, tremor, headaches.  Increase sertraline/Zoloft to 150 mg daily for anxiety and mood.   Follow-up as needed with your neurologist, Dr. Amato. Can call [phone] to get scheduled or with neurological concerns.   Continue all other cares per primary care provider.   Continue all other medications as reviewed per electronic medical record today.   Safety plan reviewed. To the Emergency Department as needed or call after hours crisis line at [phone] or [phone]. [place] Crisis Text Line. Text [place] to 282988 or Suicide LifeLine Chat: suicidepreventionlifeline.org/chat  Continue individual psychotherapy for additional support and ongoing development of nonpharmacologic coping skills and strategies.  Schedule an appointment with me in 4-6 weeks or sooner as needed. Call [facility] at [phone] to schedule.  Follow up with primary care provider as planned or for acute medical concerns.  Call the psychiatric nurse line with medication questions or concerns at [phone].  MyChart may be used to communicate with your provider, but this is not intended to be used for emergencies.    Patient Education   Collaborative Care Psychiatry Service  What to Expect  Here's what to expect from your Collaborative Care Psychiatry Service (CCPS).   About CCPS  CCPS means 2 people work together to help you get better. You'll meet with a behavioral health clinician and a psychiatric doctor. A behavioral health clinician helps people with mental health problems by talking with them. A psychiatric doctor helps people by giving them medicine.  How it works  At every visit, you'll see the behavioral health clinician (BHC) first. They'll talk with you about how you're doing and teach you how to feel better.   Then you'll see the psychiatric doctor. This doctor can  "help you deal with troubling thoughts and feelings by giving you medicine. They'll make sure you know the plan for your care.   CCPS usually takes 3 to 6 visits. If you need more visits, we may have you start seeing a different psychiatric doctor for ongoing care.  If you have any questions or concerns, we'll be glad to talk with you.  About visits  Be open  At your visits, please talk openly about your problems. It may feel hard, but it's the best way for us to help you.  Cancelling visits  If you can't come to your visit, please call us right away at [phone]. If you don't cancel at least 24 hours (1 full day) before your visit, that's \"late cancellation.\"  Being late to visits  Being very late is the same as not showing up. You will be a \"no show\" if:  Your appointment starts with a [facility], and you're more than 15 minutes late for a 30-minute (half hour) visit. This will also cancel your appointment with the psychiatric doctor.  Your appointment is with a psychiatric doctor only, and you're more than 15 minutes late for a 30-minute (half hour) visit.  Your appointment is with a psychiatric doctor only, and you're more than 30 minutes late for a 60-minute (full hour) visit.  If you cancel late or don't show up 2 times within 6 months, we may end your care.   Getting help between visits  If you need help between visits, you can call us Monday to Friday from 8 a.m. to 4:30 p.m. at [phone].  Emergency care  Call 911 or go to the nearest emergency department if your life or someone else's life is in danger.  Call 988 anytime to reach the national Suicide and Crisis hotline.  Medicine refills  To refill your medicine, call your pharmacy. You can also call [facility] at [phone], Monday to Friday, 8 a.m. to 4:30 p.m. It can take 1 to 3 business days to get a refill.   Forms, letters, and tests  You may have papers to fill out, like FMLA, short-term disability, and workability. " We can help you with these forms at your visits, but you must have an appointment. You may need more than 1 visit for this, to be in an intensive therapy program, or both.  Before we can give you medicine for ADHD, we may refer you to get tested for it or confirm it another way.  We may not be able to give you an emotional support animal letter.  We don't do mental health checks ordered by the court.   We don't do mental health testing, but we can refer you to get tested.   Thank you for choosing us for your care.  For informational purposes only. Not to replace the advice of your health care provider. Copyright   2022 [facility]. All rights reserved. [employer] 224040 - Rev 11/24.

## 2025-05-13 NOTE — Clinical Note
Hi there! Saw Azalea today. I have concerns regarding her decline since about Jan this year. She had been relatively stable for awhile leading up to then. Pt with notably worsening anxiety, tremor, word finding trouble, headaches, memory when seeing me Jan 2nd and since then. That is when we decided to change effexor to sertraline. Really no improvements since then. Also a 25 pound weight loss since last fall. She did follow-up with neurology and they weren't too concerned about much. Didn't really seem interested in investigating/treating headaches (I still wonder about NPH and abnormal head CT) and also Azalea is declining rec for repeat neuropsych testing. Maybe she will agree to see Dr. Mcdonough? I have encouraged her to consider getting this done again since it can offer info about memory and possibly influence drug/tx options. Pt and  are hopeful to be able to get a second opinion on at [place] on her case. Just wanted to keep you updated.  Thanks! -Juyd

## 2025-05-13 NOTE — PROGRESS NOTES
"Telemedicine Visit: The patient's condition can be safely assessed and treated via synchronous audio and visual telemedicine encounter.      Reason for Telemedicine Visit: Patient has requested telehealth visit    Originating Site (Patient Location): Patient's home    Distant Location (provider location): Off-Site    Consent:  The patient/guardian has verbally consented to: the potential risks and benefits of telemedicine (video visit) versus in person care; bill my insurance or make self-payment for services provided; and responsibility for payment of non-covered services.     Mode of Communication:  Video Conference via [employer]    As the provider I attest to compliance with applicable laws and regulations related to telemedicine.        Outpatient Psychiatric Progress Note    Name: Jolynn Haji   : 1955                    Primary Care Provider: Helga Ibarra MD   Therapist: DINESH Campbell    PHQ-9 scores:      2025     1:01 PM 4/10/2025     8:43 AM 2025     5:39 PM   PHQ-9 SCORE   PHQ-9 Total Score MyChart 9 (Mild depression) 12 (Moderate depression) 4 (Minimal depression)   PHQ-9 Total Score 9  12  4        Patient-reported       TIFFANY-7 scores:      2025     8:30 PM 2025     1:06 PM 2025     4:40 PM   TIFFANY-7 SCORE   Total Score 7 (mild anxiety) 6 (mild anxiety) 3 (minimal anxiety)   Total Score 7  6  3        Patient-reported       Patient Identification:  Patient is a 69 year old,   White Choose not to answer female  who presents for return visit with me.  Patient is currently retired. Patient attended the phone/video session with  Zach. Patient prefers to be called: \" Azalea\".    Interim History:  I last saw Jolynn Haji for outpatient psychiatry return visit on 2025 . During that appointment, we:  Continue trazodone 12.5-50 mg at bedtime as needed for sleep.  Discussed it is okay if you would like to trial discontinuation for at least 1-2 weeks " "to see how you feel and sleep off the medication.  Continue propranolol EXTENDED RELEASE 60 mg ONCE daily for anxiety, tremor, headaches.  Increase sertraline/Zoloft to 125 mg daily for anxiety and mood.   Follow-up as needed with your neurologist, Dr. Amato. Can call [phone] to get scheduled or with neurological concerns.     5/13: Patient overall with ongoing high anxiety.  Ongoing chronic headaches.  Weight loss of about 25 pounds in the last 8 months without effort to be losing weight.  High fatigue ongoing.  Often feeling like her ears are plugged up.  Did stop trazodone about a month ago.  Sleeping okay without trazodone.  Does feel like she gets better and \"deeper\" sleep when taking trazodone.  Symptoms of fatigue, headaches, etc. did not change after discontinuing trazodone for the past 1 month.  Patient and  interested in second opinion at [facility].  Patient still not driving due to anxiety.  Does feel like increase sertraline dose to 125 mg helped reduce racing heart sensation.  Patient reports consistency/adherence with propranolol extended release dosing.  No acute safety concerns.  No acute suicidality.  No problematic drug or alcohol use.  See [facility] note below for additional details.    Per [facility], Mica George, [facility], during today's team-based visit:  Current Stressors / Issues:  MH update:  Less groggy.  Anxiety and mood about the same.  Son is getting . He is proposing on Friday.  Stresses:  thinking about moving- mobility/health reasons- going to meet with a relator to discuss options.  Has yet to make it to the cabin.  Appetite: n/a  Sleep:  not taking trazodone, stopped about a month ago.  Not needed.  Sleeping well.  Wakes up to urinate, but getting enough sleep.  Outpatient Provider updates: Mica [facility]  SI/SIB/HI:  Denies n/a  Side effects/compliance:  Interventions:  [facility] engaged in conversation about anxiety mgmt and breaking tasks down  Most important:  afternoon " "feeling crappy often.  Headache, shallower breathing, ears plugged up, fatigue.  Feeling this way more often or not.  Fine 5am-afternoon, then not great, better after dinner to bedtime. Headaches worse.    Past Psychiatric Med Trials:  Psych Meds at Intake:  lexapro 20 mg daily  abilify 5 mg daily      Past Psych Meds:  Amitriptyline  Quetiapine  Mirtazapine - stopped \"because I was sleeping through the night;\" hallucinations     Psychiatric ROS:  Jolynn Haji reports mood has been: See HPI above  Anxiety has been: See HPI above  Sleep has been: Sleeping ok with trazodone  Seema sxs: None  Psychosis sxs: N/A  ADHD/ADD sxs: N/A  PTSD sxs: N/A  PHQ9 and GAD7 scores were reviewed today if completed.   Medication side effects: See HPI above  Current stressors include: Symptoms and See HPI above  Coping mechanisms and supports include: Family, Hobbies and Friends, therapy    Current medications include:   Current Outpatient Medications   Medication Sig Dispense Refill    clindamycin (CLEOCIN T) 1 % external lotion Apply topically 2 times daily. For acne flares 60 mL 3    lisinopril (ZESTRIL) 2.5 MG tablet Take 1 tablet (2.5 mg) by mouth daily as needed (BP >135/85). 90 tablet 3    propranolol ER (INDERAL LA) 60 MG 24 hr capsule TAKE ONE CAPSULE BY MOUTH ONCE DAILY 90 capsule 3    sertraline (ZOLOFT) 100 MG tablet Take 1 tablet (100 mg) by mouth daily. Take WITH 25 mg tab for total daily dose 125 mg. 90 tablet 1    sertraline (ZOLOFT) 25 MG tablet Take 1 tablet (25 mg) by mouth daily. Take WITH 100 mg tab for total daily dose 125 mg. 90 tablet 1    traZODone (DESYREL) 50 MG tablet Take 0.5-1 tablets (25-50 mg) by mouth nightly as needed for sleep. 90 tablet 1    lactobacillus rhamnosus, GG, (CULTURELL) capsule Take 1 capsule by mouth daily Taking every other day       Current Facility-Administered Medications   Medication Dose Route Frequency Provider Last Rate Last Admin    lidocaine 1 % 4 mL  4 mL Other Once         " lidocaine 1 % 4 mL  4 mL INTRA-ARTICULAR Once Helga Ibarra MD           Past Medical/Surgical History:  Past Medical History:   Diagnosis Date    Abnormal glandular Papanicolaou smear of cervix- ASCUS in 2000 12/5/2003    ASC H- 2000    Basal cell carcinoma of leg, right 05/2016    Dr Rogers    Colon polyps 05/2018    tubular adenoam - due 5 yrs - 2 mm    Concussion without loss of consciousness 4/12/2017    Hidradenitis     left  groin    HSIL (high grade squamous intraepithelial lesion) on Pap smear of cervix 7/5/2000    Normal paps from 2001 to 2016/cc    Hypertension goal BP (blood pressure) < 140/90     OA (osteoarthritis) of knee     moderate    Postmenopausal since 7/2008     Shingles 7/23/2010    left     Squamous cell carcinoma of skin, unspecified     Synovial cyst of popliteal space       has a past medical history of Abnormal glandular Papanicolaou smear of cervix- ASCUS in 2000 (12/5/2003), Basal cell carcinoma of leg, right (05/2016), Colon polyps (05/2018), Concussion without loss of consciousness (4/12/2017), Hidradenitis, HSIL (high grade squamous intraepithelial lesion) on Pap smear of cervix (7/5/2000), Hypertension goal BP (blood pressure) < 140/90, OA (osteoarthritis) of knee, Postmenopausal (since 7/2008 ), Shingles (7/23/2010), Squamous cell carcinoma of skin, unspecified, and Synovial cyst of popliteal space.    She has no past medical history of Malignant melanoma (H) or Squamous cell carcinoma.    Social History:  Reviewed. No changes to social history except as noted above in HPI.    Vital Signs:   None. This is phone/video visit.     Vitals taken on 4/15/2025:  Blood pressure 122/80  Pulse 75  Weight 125 pounds    Imaging:  EXAM: CT HEAD W/O CONTRAST  LOCATION: [facility]  DATE: 3/6/2025  IMPRESSION:  1.  No acute intracranial abnormality.  2.  Similar dilated ventricular system which is greater than expected for parenchymal volume loss. Suggest  "correlation with symptoms of normal pressure hydrocephalus.      Narrative & Impression   CT SCAN OF THE HEAD WITHOUT CONTRAST   4/14/2017 2:37 PM      IMPRESSION:  1. No evidence of acute trauma.  2. Mild generalized atrophy of the brain.        TIEN JOHNS MD  Labs:  Most recent laboratory results reviewed and the pertinent results include:   TSH   Date Value Ref Range Status   02/06/2025 1.49 0.30 - 4.20 uIU/mL Final   04/25/2022 1.30 0.40 - 4.00 mU/L Final   01/13/2021 2.07 0.40 - 4.00 mU/L Final     Lab Results   Component Value Date    WBC 6.8 02/06/2025    WBC 6.4 01/13/2021     Lab Results   Component Value Date    RBC 4.56 02/06/2025    RBC 4.54 01/13/2021     Lab Results   Component Value Date    HGB 13.9 02/06/2025    HGB 13.3 01/13/2021     Lab Results   Component Value Date    HCT 41.3 02/06/2025    HCT 41.7 01/13/2021     No components found for: \"MCT\"  Lab Results   Component Value Date    MCV 91 02/06/2025    MCV 92 01/13/2021     Lab Results   Component Value Date    MCH 30.5 02/06/2025    MCH 29.3 01/13/2021     Lab Results   Component Value Date    MCHC 33.7 02/06/2025    MCHC 31.9 01/13/2021     Lab Results   Component Value Date    RDW 13.0 02/06/2025    RDW 13.1 01/13/2021     Lab Results   Component Value Date     02/06/2025     01/13/2021         Last Comprehensive Metabolic Panel:  Sodium   Date Value Ref Range Status   01/09/2025 138 135 - 145 mmol/L Final   01/13/2021 140 133 - 144 mmol/L Final     Potassium   Date Value Ref Range Status   01/09/2025 4.4 3.4 - 5.3 mmol/L Final   04/25/2022 4.4 3.4 - 5.3 mmol/L Final   01/13/2021 4.1 3.4 - 5.3 mmol/L Final     Chloride   Date Value Ref Range Status   01/09/2025 101 98 - 107 mmol/L Final   04/25/2022 105 94 - 109 mmol/L Final   01/13/2021 108 94 - 109 mmol/L Final     Carbon Dioxide   Date Value Ref Range Status   01/13/2021 28 20 - 32 mmol/L Final     Carbon Dioxide (CO2)   Date Value Ref Range Status   01/09/2025 26 22 - " 29 mmol/L Final   04/25/2022 26 20 - 32 mmol/L Final     Anion Gap   Date Value Ref Range Status   01/09/2025 11 7 - 15 mmol/L Final   04/25/2022 6 3 - 14 mmol/L Final   01/13/2021 4 3 - 14 mmol/L Final     Glucose   Date Value Ref Range Status   01/09/2025 99 70 - 99 mg/dL Final   04/25/2022 104 (H) 70 - 99 mg/dL Final   01/13/2021 91 70 - 99 mg/dL Final     Comment:     Fasting specimen     Urea Nitrogen   Date Value Ref Range Status   01/09/2025 22.3 8.0 - 23.0 mg/dL Final   04/25/2022 23 7 - 30 mg/dL Final   01/13/2021 18 7 - 30 mg/dL Final     Creatinine   Date Value Ref Range Status   01/09/2025 0.72 0.51 - 0.95 mg/dL Final   01/13/2021 0.68 0.52 - 1.04 mg/dL Final     GFR Estimate   Date Value Ref Range Status   01/09/2025 89 >60 mL/min/1.73m2 Final     Comment:     eGFR calculated using 2021 CKD-EPI equation.   01/13/2021 >90 >60 mL/min/[1.73_m2] Final     Comment:     Non  GFR Calc  Starting 12/18/2018, serum creatinine based estimated GFR (eGFR) will be   calculated using the Chronic Kidney Disease Epidemiology Collaboration   (CKD-EPI) equation.       Calcium   Date Value Ref Range Status   01/09/2025 9.7 8.8 - 10.4 mg/dL Final     Comment:     Reference intervals for this test were updated on 7/16/2024 to reflect our healthy population more accurately. There may be differences in the flagging of prior results with similar values performed with this method. Those prior results can be interpreted in the context of the updated reference intervals.   01/13/2021 8.8 8.5 - 10.1 mg/dL Final     Bilirubin Total   Date Value Ref Range Status   02/06/2025 0.5 <=1.2 mg/dL Final   01/13/2021 0.8 0.2 - 1.3 mg/dL Final     Alkaline Phosphatase   Date Value Ref Range Status   02/06/2025 77 40 - 150 U/L Final   01/13/2021 72 40 - 150 U/L Final     ALT   Date Value Ref Range Status   02/06/2025 21 0 - 50 U/L Final   01/13/2021 17 0 - 50 U/L Final     AST   Date Value Ref Range Status   02/06/2025 22 0 -  45 U/L Final   01/13/2021 13 0 - 45 U/L Final     Review of Systems:  10 systems (general, cardiovascular, respiratory, eyes, ENT, endocrine, GI, , M/S, neurological) were reviewed. Most pertinent finding(s) is/are: Some chronic pains, headaches, fatigue see HPI above. The remaining systems are all unremarkable.    Mental Status Examination (limited as this is by phone/video):  Appearance: Awake, alert, appears stated age, no acute distress, well-groomed   Attitude:  cooperative, pleasant   Motor: No gross abnormalities noted today. Difficult to assess via video.  Not formally tested  Oriented to:  person, place, time, and situation  Attention Span and Concentration: Attention seems intact, concentration struggles noted but not formally tested  Speech: Normal volume, some hesitation noted, word-finding difficulties  Language: word-finding difficulties?  Mood: anxious  Affect: Mood congruent  Associations:  no loose associations  Thought Process: Overall linear and logical  Thought Content:  no evidence of suicidal ideation or homicidal ideation, no auditory hallucinations present and no visual hallucinations present  Recent and Remote Memory: Seems to be struggling more with some short-term memory -not formally tested  Fund of Knowledge: appropriate  Insight: Fair-good  Judgment:  intact, adequate for safety  Impulse Control:  intact    Suicide Risk Assessment:  Today Jolynn Haji reports no suicidal ideation. Based on all available evidence including the factors cited above, Jolynn Haji does not appear to be at imminent risk for self-harm, does not meet criteria for a 72-hr hold, and therefore remains appropriate for ongoing outpatient level of care.  A thorough assessment of risk factors related to suicide and self-harm have been reviewed and are noted above. The patient convincingly denies suicidality on several occasions. Local community safety resources reviewed for patient to use if needed. There was no  deceit detected, and the patient presented in a manner that was believable.     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 - lower pretibial area- has follow up with Dermatology 3/4/2025 07/12/2023     Priority: Medium    Drug-induced parkinsonism (H)- Abilify - generic = ARIPiprazole( 5mg) - RESOLVED OFF ABILIFY  01/02/2023     Priority: Medium    Mood disorder 01/02/2023     Priority: Medium    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 - much improved -mostly  resolved as of 5/5/2023 - slight  presence noted 3/5/2025 07/07/2022     Priority: Medium    Adjustment insomnia - resolved as of 5/5/2023 - off of ambien with psychiatry's okay  12/23/2021     Priority: Medium    Screening for cervical cancer      Priority: Medium     1999 NIL  2000 ASCUS-H.  >> Colpo: Neg  2001 NIL   2003 NIL pap, Neg HPV  [id number], 2016  NIL pap  7/25/19 NIL pap, Neg HPV    Criteria necessary to stop screening per ASCCP guidelines:  Older than 65 years  Three consecutive negative cytology results or two consecutive negative cotest results within the previous 10 years, with the most recent being performed within the last 5 years.   (Women with hx of MADDIE 2 or 3, or adenocarcinoma in situ should continue screening for full 20 years, even if this goes past age 65).        Hyperlipidemia LDL goal <130 11/30/2018     Priority: Medium    Basal cell carcinoma      Priority: Medium    Colon polyps 05/01/2018     Priority: Medium    Primary localized osteoarthrosis of left lower leg 02/19/2018     Priority: Medium    Other secondary osteoarthritis of left knee 01/29/2018     Priority: Medium    Chronic constipation 09/14/2017     Priority: Medium    Raynaud's phenomenon without gangrene 09/14/2017     Priority: Medium    Insomnia, unspecified type 07/25/2017     Priority: Medium    TIFFANY (generalized anxiety disorder) 05/23/2017     Priority: Medium    Adjustment disorder with mixed anxiety and depressed mood- mild  at this time 05/11/2017     Priority: Medium    Family history of celiac disease 05/11/2017     Priority: Medium    Basal cell carcinoma of leg, right 05/01/2016     Priority: Medium    Sun-damaged skin 04/21/2016     Priority: Medium    Seasonal allergic rhinitis 04/21/2016     Priority: Medium    Dupuytren's disease of palm - right 4th digit flexor tendon 06/11/2015     Priority: Medium    History of migraine headaches- assoc. with nausea/vomiting - resolved around menopause - were  premenstrual  01/08/2014     Priority:  Medium    Rosacea 05/01/2013     Priority: Medium    Numbness of feet- distal feet R>L  02/21/2011     Priority: Medium    NUMBNESS AND TINGLING OF FOOT - bilateral -mild  08/25/2010     Priority: Medium    Osteopenia 01/02/2009     Priority: Medium    Lipidosis 10/24/2006     Priority: Medium     Problem list name updated by automated process. Provider to review      Localized osteoarthritis of hand 11/01/2005     Priority: Medium     Problem list name updated by automated process. Provider to review      Stress incontinence, female - mild 10/23/2012     Priority: Low    Cough 10/23/2012     Priority: Low    Acute bronchitis- recurrent- ? related to mold in school building- pt has since retired and no further bronchitis 10/23/2012     Priority: Low    Postmenopausal      Priority: Low    DERMATOPHYTOSIS OF NAIL- toenails  03/14/2006     Priority: Low     trichophyton rubrum on culture - 1/06       Synovial cyst of popliteal space      Priority: Low    Hidradenitis 12/05/2003     Priority: Low       Psychosocial & Contextual Factors: see HPI above    Assessment:  From Intake, 5/12/2022:  Jolynn Haji is a 66-year-old female with a history including anxiety, depression, insomnia status post concussion in April 2017.  No major mental health struggles prior to concussion in 2017.  Patient was started on Lexapro, quetiapine, mirtazapine to help with her symptoms.  Patient had been feeling much better on that medication combination and once she was sleeping well again quetiapine and mirtazapine were discontinued.  This past January the patient started to have some worsening symptoms and Abilify was added.  Patient has felt like her current medications of Lexapro and Abilify have been helpful but reports starting to feel really slowed and numb the past several weeks.  She also reported a weight loss of nearly 20 pounds over the last 6 weeks versus several months (patient was not very sure). I am wondering if she could be  experiencing side effects of her increased Lexapro dose.  She is agreeable to decreasing her dose slightly to 15 mg daily.  Could also consider decreasing Abilify in the case it is not her Lexapro.  She also desires to sleep a little bit better than she has been.  Could consider restarting mirtazapine at bedtime for sleep and to also augment her Lexapro.  Could also be beneficial for patient to undergo cognitive screening to check in and see what her baseline is status post concussion.  No acute safety concerns.  No SI.  No problematic drug or alcohol use.    5/24/2022:  Patient with some slight improvements of possible medication related side effects after decreasing Lexapro.  I am wondering if there might be more robust improvements with decreasing Abilify/aripiprazole.  Possible drug-induced Parkinsonism?  She does seem to have delayed/slowed speech, masked facies.  Her gait and other movements unable to be observed but she does report other struggles that may be consistent with such a presentation.  We are going to decrease her Abilify down to 2 mg daily and I will see her back in just a few weeks.  We will likely discontinue her Abilify at her next visit.  I will message her primary care provider to get some collateral regarding previous baseline functioning prior to starting Abilify.  History of TBI could be confounding presentation.  Could consider neurology consult.    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  Instructed to stop Abilify today.  We will monitor for improvement of symptoms.  If symptoms do not improve may need to refer to neurology.  If symptoms start to improve we will consider low-dose methylphenidate augmentation of Lexapro next visit to help with low energy, mood, focus and concentration, cognition, s/p TBI if still clinically indicated.  We would discuss risks and benefits with patient and her daughter at that time.  No other acute safety concerns or SI.   No problematic drug or alcohol use.  If patient starts to struggle with sleep would restart mirtazapine at bedtime.    7/28/2022:  Pt still with sxs consistent with drug-induced Parksinsonism.  Neurology consult will be placed so that patient might get scheduled sooner than later due to the typically long wait.  We will start Ambien in hopes we can get her sleeping much better.  Discussed risks and benefits of its use, particularly in the geriatric population.  Lexapro has not been as helpful as it helped for her anxiety.  We will taper this medication and start Effexor/venlafaxine in hopes that might be more effective for her symptoms.  No acute safety concerns.  No SI.  No problematic drug or alcohol use.    8/11/2022:  Overall with continued improved symptoms.  Hopefully patient will continue to have improvement of her symptoms off of Abilify.  Patient will be seeing neurology soon.  Encouraged to keep appointment.  We will continue taper off of Lexapro and continue on monotherapy with venlafaxine.  We will also continue Ambien at this time for sleep since patient is sleeping much better on the medication with improved symptoms and tolerating well.  No acute safety concerns.  No problematic drug or alcohol use.    9/1/2022:  Ongoing anxiety, still intense at times.  Still not sleeping very well.  Could consider sleep evaluation, but patient quite overwhelmed right now with various referrals and doctors appointments.  Briseyda had been working quite well but not keeping her sleep now.  Still falling asleep relatively well.  We will transition to Ambien CR to see if that is more helpful.  We will continue to consider sleep evaluation to rule out possibility of REM behavior sleep disorder.  We will also transition venlafaxine to bedtime to see if that helps with any possible nausea.  Working with neurology to monitor for Parkinson's disease.  I do recommend neuropsychological testing due to ongoing possible cognitive  difficulties/struggles.  No acute safety concerns.  No SI.  No problematic drug or alcohol use.    10/3/2022:  Patient overall with some ongoing anxiety but slowly improving since it got worse late August.  Recommend continuing to optimize Effexor-XR, especially since patient tolerating well.  No longer having any nausea now that dose is taken at bedtime.  Patient feeling a little foggy and off balance with Ambien CR and so we will switch back to Ambien immediate release.  No falls.  Recommend increasing Effexor-XR first and once well tolerated switching back to Ambien immediate release.  May need to continue to work on alternative options for sleep if does not do well back on Ambien immediate release.  No acute safety concerns.  No SI.  No problematic drug or alcohol use.  Encouraged to continue working with neurology.  Next appointment in November.    10/24/2022:  Patient overall with some improved anxiety on increased dose of Effexor-XR.  Could use additional improvement of anxiety and so we will continue to optimize therapy with Effexor-XR.  We will increase dose to 150 mg daily.  Tolerating well with no notable negative side effects.  Patient transitioned back to Ambien CR and is sleeping better on this compared to Ambien immediate release.  We will continue Ambien CR for sleep.  Discussed possibility of sleep medicine referral but patient declines at this time.  Could consider in the future.  No acute safety concerns.  No SI.  No problematic drug or alcohol use.  Encouraged to continue working with neurology.    12/09/2022:  Patient overall doing fairly well.  Feels like increased dose of venlafaxine has been helpful.  I do wonder if she is having some subtle withdrawal symptoms late in the afternoon with headache and fatigue.  Discussed shifting her dose to the morning.  However, she reports she will try to drink more water and see if that is helpful before trying to make changes to her medication.  She feels  like things are going well enough that she really does not want to change anything.  Encouraged to continue in therapy.  No acute safety concerns.  No SI.  No problematic drug or alcohol use.    1/19/2023:  Patient overall doing well with ongoing improvements.  Patient even recently starting to drive again which is a big deal.  She continues in therapy and finds it helpful.  Discussed possible changes to her dosing to see if headaches might improve, along with fatigue.  Discussed possibly splitting her Effexor-XR dose as 75 mg twice daily versus 37 point 5 in the morning and 112.5 mg at bedtime.  Patient feels like she is in a good place right now and really does not want to make any medication changes if possible.  We will keep things the same for now.  Encouraged to drink lots of water.  No acute safety concerns.  No SI.  No problematic drug or alcohol use.    3/9/2023:  Since last visit we decreased Effexor-XR just slightly.  Unfortunately her anxiety has worsened on decreased dose.  Headaches and fatigue did not improve at all.  Since anxiety worsened we will go back up to 150 mg daily.  Patient prefers this over a trial of further decreased dose.  We will start a trial with propranolol to see if this helps improve anxiety and headaches.  May also help with her tremor.  Discussed watching for feeling too lightheaded or dizzy.  Patient also on low-dose lisinopril.  Last vital signs blood pressure 132/78 and pulse 96 on 2/24/2023.  No acute safety concerns.  No SI.  No problematic drug or alcohol use.    3/23/2023:  Patient overall doing very well.  Propranolol has been quite helpful.  Still having some anxiety and headaches so we will continue to optimize therapy with propranolol some.  We will increase to 20 mg twice daily.  Neuropsychiatric testing reviewed.  Some mild deficits but nothing indicating a neurocognitive disorder at this time.  This was reassuring to the patient.  Patient will review results with  neurologist at upcoming appointment in May.  No acute safety concerns.  No SI.  No problematic drug or alcohol use.    4/27/2023:  Patient overall with relative stability of mood and anxiety symptoms.  We will work to discontinue Ambien to see if we can manage on medication such as trazodone.  I still continue to worry about possibility of prodromal Parkinson's disease.  Patient reported her sister noted patient to be yelling and screaming in her sleep the other night.  This concerns me for possible REM sleep behavior disorder.  Could simply be related to antidepressant therapy-but given recent parkinsonism response to neuroleptic, symptoms should continue to be monitored closely.  Could again consider sleep medicine referral at some point in the future if needed.  Patient also continues to have headaches.  If patient remains stable would like to trial decreased dose of Effexor-XR to see if this helps improve headaches.  No acute safety concerns.  No SI.  No problematic drug or alcohol use.    5/11/2023:  Patient overall doing well.  Was able to replace Ambien with trazodone and still sleeping well.  Still some headaches but greatly improved off Ambien.  Went back to taking propranolol 20 mg twice daily and finds this most effective for her anxiety, tremor, etc.  Discussed we could consider lowering venlafaxine ER in a couple of months if still doing really well and still having some headaches.  Patient has a neurology appointment tomorrow for check-in.  No acute safety concerns.  No SI.  No problematic drug or alcohol use.    7/11/2023:  Patient overall doing well still.  Mood has remained stable.  Anxiety overall manageable and stable as well.  Unfortunately continues to have headaches and feel fatigued.  Discussed I would like to move ahead with a trial of decreased venlafaxine.  Patient agreeable and will monitor mental health symptoms.  No acute safety concerns.  No SI.  No problematic drug or alcohol  use.    10/10/2023:  Patient overall doing okay.  Unfortunately headaches and fatigue have persisted even with decreased dose of venlafaxine.  We will increase propranolol and changed to extended release so it is once a day to see if that is a little bit more helpful for anxiety and headaches.  Patient controlled decreased dose of venlafaxine and we may consider changing to something different after the holidays.  We would like to prevent any decompensation during the holiday season.  No acute safety concerns.  No SI.  No problematic drug or alcohol use.  Patient continues in therapy.    1/9/2024:  Patient overall doing relatively well.  Still getting headaches from venlafaxine so we will decrease the dose further to 75 mg daily to see if helpful without worsening mental health symptoms.  No acute safety concerns.  No SI.  No problematic drug or alcohol use.    2/28/2024:  Patient doing relatively well.  Still some anxiety but manageable.  Anxiety does not currently interfere with patient's ability to function or go about daily activities.  Easily able to distract self.  No major mood symptoms at this time.  Side effects from venlafaxine have drastically improved on lower dose.  If mental health symptoms/anxiety were to worsen in the future, would strongly consider transition to sertraline due to negative side effects on higher doses of venlafaxine.  No acute safety concerns.  No SI.  No problematic drug or alcohol use.    6/26/2024:  Patient overall doing very well.  Mood has been stable.  Anxiety manageable.  Does use Tylenol twice a day every day for headaches.  Discussed possibility of some rebound headaches.  Resources sent to patient via [employer].  We will continue to monitor dizziness.  Blood pressures can be a little low at times.  We discussed trazodone can cause dizziness and orthostatic hypotension (blood pressure drops upon standing).  Patient wonders if it is more related to her lisinopril.  Patient  encouraged to document blood pressures and present to primary care provider for ongoing evaluation.  No acute safety concerns.  No SI.  No problematic drug or alcohol use.  No medication changes today.    9/26/2024:  Patient overall doing well.  Relatively stable.  Managing anxiety symptoms well.  Sleeping well without nightly trazodone.  Discussed patient could consider massage therapy or acupuncture to see if it improves headaches.  Patient prefers not to change Effexor at this time.  Discussed patient could also talk with primary care provider about possible neurology referral for chronic headaches.  No acute safety concerns.  No SI.  No problematic drug or alcohol use.  No med changes today.    11/20/2024:  Pt doing ok - still some ongoing anxiety and headaches - possibly from Effexor. Pt will consider transitioning off Effexor-XR at next appt 1/2 (after the holidays). Could start fluoxetine 10 mg daily for 5 days (with effexor-xr 75) then decrease Effexor-XR to 37.5 mg (and increase fluoxetine to 20 mg daily) for 7 days then stop Effexor-xr and either continue of fluoxetine or trial a new medication (duloxetine?). No acute safety concerns. No SI. No problematic drug or alcohol use.      1/2/2025:  Patient unfortunately with some worsening symptoms.  Worsening chronic headaches.  Worsening tremor.  Ongoing balance struggles that are perhaps worse.  Struggling more with memory.  Anxiety greatly increased.  Patient agreeable to transitioning off venlafaxine and onto sertraline/Zoloft.  Patient also encouraged to follow up with neurology.  CT scan of head/brain ordered without contrast due to ongoing/worsening headaches and cognitive concerns and balance issues.  Last CT scan of head completed in 2017.  No acute safety concerns.  No SI.  No problematic drug or alcohol use.    1/21/2025:  Overall doing quite well with transition to sertraline.  Patient will continue with the cross taper-titration.  It does seem she  is possibly having some improvement since starting sertraline already.  Patient and daughter will continue to monitor for side effects.  No acute safety concerns.  No suicidality.  No problematic drug or alcohol use.  Started with new psychotherapist and feels it is a good connection.    2/21/2025:  Patient overall with ongoing anxiety.  We will give sertraline 100 mg daily more time to see if mild side effects resolve and to see if any additional symptoms improve.  Patient will be seen back after her upcoming neurology appointment that is scheduled for early April. Pt's CT scan of brain W/O contrast is scheduled for March.  Patient may consider trying to taper off trazodone to see if that helps improve her afternoons.  We talked about distractions and continuing to try to stay busy in the afternoon.  Historically this has been helpful.  No acute safety concerns.  No SI.  No problematic drug or alcohol use.  Continues in psychotherapy.    4/11/2025:  Patient did complete head CT scan without contrast after last follow-up appointment.  Possible ventricular enlargement noted by radiology report.  Patient saw neurology about a week ago.  No major concerns for normal pressure hydrocephalus from neurologist at this time.  Neurologist still trying to get images of CT for review but did read the radiologist report.  Neurologist did not see signs on physical exam for PD or NPH.  Feels more recent cognitive concerns likely related to anxiety and depression symptoms.  Patient declining neuropsychological testing at this time.  Discussed consideration for IOP 55+ program.  Patient prefers to wait at this time before enrolling in IOP.  Patient is agreeable to increasing sertraline to 125 mg daily.  Will consider continued titrations as indicated and as tolerated.  Encouraged to continue in individual psychotherapy.  No acute safety concerns.  No SI.  No problematic drug or alcohol use.    5/13/2025:  Patient may be with very  slight improvements in anxiety related physical symptoms after increasing sertraline dose to 125 mg daily.  Some loose stools but they have improved quite a bit.  Patient will continue trazodone as needed but has not really desire to use it much lately since sleeping okay.  Headaches and fatigue continue.  Vital signs during a clinic visit today within normal limits.  Patient has lost about 15 pounds over the past few months. Was 149 lbs at visit 2/5/2024; 149 lbs at visit 4/25/24; 149 at 8/14/24; 140 lbs at visit 12/19/24; 135 lbs at a visit 2/6/25 and 4/15/2025 was 125 lbs. Was stable at about 150 lbs up until fall 2024 and has since lost 25 pounds in about 7-8 months without having a goal of losing weight.  We will recheck sodium level since SSRI medication can reduce sodium-however, patient with chronic headaches before starting sertraline and with normal sodium in January.  No change in symptoms off trazodone for the past several weeks.  I do continue to have concerns about normal pressure hydrocephalus.  Patient's current neurologist denied concerns at recent visit.  Patient will continue to consider recommendation for repeat neuropsychological testing.  I do have ongoing concerns for cognitive changes.  Have previously discussed recommendation for 55+ IOP.  Patient and family considering trying to get second opinion at [facility].  Patient does follow-up with primary care provider in a few weeks.  I will update primary care provider with ongoing concerns from today's visit.  No acute safety concerns.  No acute suicidality.  No problematic drug or alcohol use.  Continues in psychotherapy.  If neurologically there continue to be no concerns regarding any underlying neurological processes, could consider low-dose olanzapine to trial at bedtime for sleep and to augment sertraline for severe anxiety and to help increase appetite/reduce weight loss.  Would need to monitor very closely for any parkinsonism  symptoms.    Medication side effects and alternatives were reviewed. Health promotion activities recommended and reviewed today. All questions addressed. Education and counseling completed regarding risks and benefits of medications and psychotherapy options. Recommend therapy for additional support.     Treatment Plan:   Continue trazodone 12.5-50 mg at bedtime as needed for sleep.    Continue propranolol EXTENDED RELEASE 60 mg ONCE daily for anxiety, tremor, headaches.  Increase sertraline/Zoloft to 150 mg daily for anxiety and mood.   Follow-up as needed with your neurologist, Dr. Amato. Can call [phone] to get scheduled or with neurological concerns.   Continue all other cares per primary care provider.   Continue all other medications as reviewed per electronic medical record today.   Safety plan reviewed. To the Emergency Department as needed or call after hours crisis line at [phone] or [phone]. [place] Crisis Text Line. Text [place] to 964051 or Suicide LifeLine Chat: suicidepreExploredgeline.org/chat  Continue individual psychotherapy for additional support and ongoing development of nonpharmacologic coping skills and strategies.  Schedule an appointment with me in 4-6 weeks or sooner as needed. Call [facility] at [phone] to schedule.  Follow up with primary care provider as planned or for acute medical concerns.  Call the psychiatric nurse line with medication questions or concerns at [phone].  [employer] may be used to communicate with your provider, but this is not intended to be used for emergencies.    Administrative Billing:   Phone Call/Video Duration: 29 Minutes  Start: 3:02p  Stop: 3:31p    The longitudinal plan of care for the diagnosis(es)/condition(s) as documented were addressed during this visit. Due to the added complexity in care, I will continue to support [place] in the subsequent management and with ongoing continuity of care.    Patient  Status:  Continuous care patient.     Signed:   Judy Pompa DO  [facility]    Disclaimer: This note consists of symbols derived from keyboarding, dictation and/or voice recognition software. As a result, there may be errors in the script that have gone undetected. Please consider this when interpreting information found in this chart.

## 2025-05-13 NOTE — NURSING NOTE
Is the patient currently in the state of [place]? YES    Current patient location: [id number]  [facility] [id number]    Visit mode:Video    If the visit is dropped, the patient can be reconnected by: VIDEO VISIT: Text to cell phone:   Telephone Information:   Mobile [phone]       Will anyone else be joining the visit? No  (If patient encounters technical issues they should call [phone])    Are changes needed to the allergy or medication list? No    Are refills needed on medications prescribed by this physician? Yes    Rooming Documentation: Questionnaire(s) completed.    Reason for visit: RECHECK     BRET Tomas

## 2025-05-21 NOTE — PROGRESS NOTES
Surgical Office Location:  [facility]  [address] [place] [id number]

## 2025-05-22 ENCOUNTER — OFFICE VISIT (OUTPATIENT)
Dept: DERMATOLOGY | Facility: CLINIC | Age: 70
End: 2025-05-22
Payer: COMMERCIAL

## 2025-05-22 DIAGNOSIS — C44.311 BASAL CELL CARCINOMA (BCC) OF LEFT ALA NASI: Primary | ICD-10-CM

## 2025-05-22 NOTE — PROGRESS NOTES
Jolynn Haji is an extremely pleasant 69 year old year old female patient here today for evaluation and managment of basal cell carcinoma on nose.   .   Patient has no other skin complaints today.  Remainder of the HPI, Meds, PMH, Allergies, FH, and SH was reviewed in chart.      Past Medical History:   Diagnosis Date    Abnormal glandular Papanicolaou smear of cervix- ASCUS in 2003    ASC H- 2000    Basal cell carcinoma of leg, right 2016    Dr Rogers    Colon polyps 2018    tubular adenoam - due 5 yrs - 2 mm    Concussion without loss of consciousness 2017    Hidradenitis     left  groin    HSIL (high grade squamous intraepithelial lesion) on Pap smear of cervix 2000    Normal paps from  to /    Hypertension goal BP (blood pressure) < 140/90     OA (osteoarthritis) of knee     moderate    Postmenopausal since 2008     Shingles 2010    left     Squamous cell carcinoma of skin, unspecified     Synovial cyst of popliteal space        Past Surgical History:   Procedure Laterality Date    COLONOSCOPY  2018    polyps, tubular - 2 mm - due 5 yrs    COLONOSCOPY N/A 2023    Procedure: Colonoscopy;  Surgeon: Yogesh Cedeño MD;  Location: [facility] NONSPECIFIC PROCEDURE      Colposcopy        Family History   Problem Relation Age of Onset    Thyroid Disease Mother         Graves disease    Hypertension Mother     Diabetes Mother         type 2    Blood Disease Mother          from leukemia at age 78    Heart Disease Father 62        MV problem with CHF  at 62/ from MI    Hypertension Father     Diabetes Son         Juvenile Diabetes    Diabetes Daughter         Juvenile Diabetes    Cancer Maternal Uncle         type ??    Cancer Maternal Uncle         liver    Colon Cancer No family hx of        Social History     Socioeconomic History    Marital status:      Spouse name: Zach    Number of children: 2    Years of education: 16    Highest  education level: Not on file   Occupational History    Occupation: teacher first grade -retired at age 60     Comment: [employer]     Employer: [employer]   Tobacco Use    Smoking status: Never     Passive exposure: Never    Smokeless tobacco: Never   Vaping Use    Vaping status: Never Used   Substance and Sexual Activity    Alcohol use: Yes     Alcohol/week: 0.0 - 1.0 standard drinks of alcohol     Comment: 2 per month    Drug use: No     Comment: no herbal meds either    Sexual activity: Yes     Partners: Male     Birth control/protection: Surgical     Comment: -was on DepoProvera since 2000-2/2008 -  had vasectomy   Other Topics Concern     Service No    Blood Transfusions No    Caffeine Concern No    Occupational Exposure No    Hobby Hazards No    Sleep Concern No    Stress Concern No    Weight Concern No    Special Diet No    Back Care No    Exercise Yes     Comment: regular     Bike Helmet No    Seat Belt Yes     Comment: always    Self-Exams Yes     Comment: SBE  encouraged monthly    Parent/sibling w/ CABG, MI or angioplasty before 65F 55M? No   Social History Narrative    [place] - [place] Cocker Spaniel - going to be 6 yrs old spring 2021---needs walks every day. --Helga Ibarra MD      January 13, 2021      Social Drivers of Health     Financial Resource Strain: Low Risk  (1/31/2025)    Financial Resource Strain     Within the past 12 months, have you or your family members you live with been unable to get utilities (heat, electricity) when it was really needed?: No   Food Insecurity: Low Risk  (1/31/2025)    Food Insecurity     Within the past 12 months, did you worry that your food would run out before you got money to buy more?: No     Within the past 12 months, did the food you bought just not last and you didn t have money to get more?: No   Transportation Needs: Low Risk  (1/31/2025)    Transportation Needs     Within the past 12 months,  has lack of transportation kept you from medical appointments, getting your medicines, non-medical meetings or appointments, work, or from getting things that you need?: No   Physical Activity: Insufficiently Active (1/31/2025)    Exercise Vital Sign     Days of Exercise per Week: 2 days     Minutes of Exercise per Session: 30 min   Stress: Stress Concern Present (1/31/2025)    [employer] [place] [employer] - Occupational Stress Questionnaire     Feeling of Stress : Very much   Social Connections: Unknown (1/31/2025)    Social Connection and Isolation Panel [NHANES]     Frequency of Communication with Friends and Family: Not on file     Frequency of Social Gatherings with Friends and Family: Twice a week     Attends Mandaeism Services: Not on file     Active Member of Clubs or Organizations: Not on file     Attends Club or Organization Meetings: Not on file     Marital Status: Not on file   Interpersonal Safety: Low Risk  (2/6/2025)    Interpersonal Safety     Do you feel physically and emotionally safe where you currently live?: Yes     Within the past 12 months, have you been hit, slapped, kicked or otherwise physically hurt by someone?: No     Within the past 12 months, have you been humiliated or emotionally abused in other ways by your partner or ex-partner?: No   Housing Stability: Low Risk  (1/31/2025)    Housing Stability     Do you have housing? : Yes     Are you worried about losing your housing?: No       Outpatient Encounter Medications as of 5/22/2025   Medication Sig Dispense Refill    clindamycin (CLEOCIN T) 1 % external lotion Apply topically 2 times daily. For acne flares 60 mL 3    lisinopril (ZESTRIL) 2.5 MG tablet Take 1 tablet (2.5 mg) by mouth daily as needed (BP >135/85). 90 tablet 3    propranolol ER (INDERAL LA) 60 MG 24 hr capsule TAKE ONE CAPSULE BY MOUTH ONCE DAILY 90 capsule 3    sertraline (ZOLOFT) 100 MG tablet Take 1 tablet (100 mg) by mouth daily. Take WITH 50 mg tab for  total daily dose 150 mg. 90 tablet 1    sertraline (ZOLOFT) 50 MG tablet Take 1 tablet (50 mg) by mouth daily. Take WITH 100 mg tab for total daily dose 150 mg. 90 tablet 1    traZODone (DESYREL) 50 MG tablet Take 0.5-1 tablets (25-50 mg) by mouth nightly as needed for sleep. 90 tablet 1     Facility-Administered Encounter Medications as of 5/22/2025   Medication Dose Route Frequency Provider Last Rate Last Admin    lidocaine 1 % 4 mL  4 mL Other Once         lidocaine 1 % 4 mL  4 mL INTRA-ARTICULAR Once Helga Ibarra MD                 O:   NAD, WDWN, Alert & Oriented, Mood & Affect wnl, Vitals stable   General appearance normal   Vitals stable   Alert, oriented and in no acute distress     L ala 3mm scaly papule       Eyes: Conjunctivae/lids:Normal     ENT: Lips, mucosa: normal    MSK:Normal    Cardiovascular: peripheral edema none    Pulm: Breathing Normal    Neuro/Psych: Orientation:Alert and Orientedx3 ; Mood/Affect:normal       A/P:  L ala basal cell carcinoma   MOHS:   Location    The rationale for Mohs surgery was discussed with the patient and consent was obtained.  The risks and benefits as well as alternatives to therapy were discussed, in detail.  Specifically, the risks of infection, scarring, bleeding, prolonged wound healing, incomplete removal, allergy to anesthesia, nerve injury and recurrence were addressed.  Indication for Mohs was Location. Prior to the procedure, the treatment site was clearly identified and, if available, confirmed with previous photos and confirmed by the patient   All components of the Universal Protocol/PAUSE rule were completed.  The Mohs surgeon operated in two distinct and integrated capacities as the surgeon and pathologist.      The area was prepped with Betasept.  A rim of normal appearing skin was marked circumferentially around the lesion.  The area was infiltrated with local anesthesia.  The tumor was first debulked to remove all clinically apparent tumor.   An incision following the standard Mohs approach was done and the specimen was oriented,mapped and placed in 1 block(s).  Each specimen was then chromacoded and processed in the Mohs laboratory using standard Mohs technique and submitted for frozen section histology.  Frozen section analysis showed no residual tumor but CLEAR MARGINS.      The tumor was excised using standard Mohs technique in 1 stages(s).  CLEAR MARGINS OBTAINED and Final defect size was 0.6 cm.     We discussed the options for wound management in full with the patient including risks/benefits/ possible outcomes.    DERMABRASION: After PGACAC discussed with patient, decision for tangential excision and dermabrasion was made. After anesthesia with Lido/Epi/Clinda and prep with hibiclens, hypertrophic areas were tangentially excised and entire cosmetic unit was smoothed 2.2cm. Hemostasis was obtained with pressure. Patient tolerated procedure well. There were no complications and EBL minimal. Patient advised to keep abraded surfaces covered with generous ointment until healed, approximately 2 weeks. Return to office in 3 months or prn.    It was a pleasure speaking to Jolynn Haji today.  Previous clinic notes and pertinent laboratory tests were reviewed prior to Jolynn Haji's visit.  Patient encouraged to perform monthly skin exams.  UV precautions reviewed with patient.  Return to clinic 3 months

## 2025-05-22 NOTE — PATIENT INSTRUCTIONS
Open Wound Care     for __NOSE____________        No strenuous activity for 48 hours    Take Tylenol as needed for discomfort.                                                .         Do not drink alcoholic beverages for 48 hours.    Keep the pressure bandage in place for 24 hours. If the bandage becomes blood tinged or loose, reinforce it with gauze and tape.        (Refer to the reverse side of this page for management of bleeding).    Remove bandage in 24 hours and begin wound care as follows:     Clean area with tap water using a Q tip or gauze pad, (shower / bathe normally)  Dry wound with Q tip or gauze pad  Apply Aquaphor, Vaseline, Polysporin or Bacitracin Ointment with a Q tip  Do NOT use Neosporin Ointment *  Cover the wound with a band-aid or nonstick gauze pad and paper tape.  Repeat wound care once a day until wound is completely healed.    It is an old wives tale that a wound heals better when it is exposed to air and allowed to dry out. The wound will heal faster with a better cosmetic result if it is kept moist with ointment and covered with a bandage.  Do not let the wound dry out.      Supplies Needed:                Qtips or gauze pads                Polysporin or Bacitracin Ointment                Bandaids or nonstick gauze pads and paper tape    Wound care kits and brown paper tape are available for purchase at   the pharmacy.    BLEEDING:    Use tightly rolled up gauze or cloth to apply direct pressure over the bandage for 20   minutes.  Reapply pressure for an additional 20 minutes if necessary  Call the office or go to the nearest emergency room if pressure fails to stop the bleeding.  Use additional gauze and tape to maintain pressure once the bleeding has stopped.  Begin wound care 24 hours after surgery as directed.                  WOUND HEALING    One week after surgery a pink / red halo will form around the outside of the wound.   This is new skin.  The center of the wound will appear  yellowish white and produce some drainage.  The pink halo will slowly migrate in toward the center of the wound until the wound is covered with new shiny pink skin.  There will be no more drainage when the wound is completely healed.  It will take six months to one year for the redness to fade.  The scar may be itchy, tight and sensitive to extreme temperatures for a year after the surgery.  Massaging the area several times a day for several minutes after the wound is completely healed will help the scar soften and normalize faster. Begin massage only after healing is complete.      In case of emergency call: Dr Rogers: [phone]    [facility]: [phone]    [facility]:[phone]                     Proper skin care from [facility]:    -Eliminate harsh soaps as they strip the natural oils from the skin, often resulting in dry itchy skin ( i.e. Dial, Zest, [place])  -Use mild soaps such as Cetaphil or Dove Sensitive Skin in the shower. You do not need to use soap on arms, legs, and trunk every time you shower unless visibly soiled.   -Avoid hot or cold showers.  -After showering, lightly dry off and apply moisturizing within 2-3 minutes. This will help trap moisture in the skin.   -Aggressive use of a moisturizer at least 1-2 times a day to the entire body (including -Vanicream, Cetaphil, Aquaphor or Cerave) and moisturize hands after every washing.  -We recommend using moisturizers that come in a tub that needs to be scooped out, not a pump. This has more of an oil base. It will hold moisture in your skin much better than a water base moisturizer. The above recommended are non-pore clogging.      Wear a sunscreen with at least SPF 30 on your face, ears, neck and V of the chest daily. Wear sunscreen on other areas of the body if those areas are exposed to the sun throughout the day. Sunscreens can contain physical and/or chemical blockers. Physical blockers are less likely  to clog pores, these include zinc oxide and titanium dioxide. Reapply every two hour and after swimming.     Sunscreen examples: https://www.ewg.org/sunscreen/    UV radiation  UVA radiation remains constant throughout the day and throughout the year. It is a longer wavelength than UVB and therefore penetrates deeper into the skin leading to immediate and delayed tanning, photoaging, and skin cancer. 70-80% of UVA and UVB radiation occurs between the hours of 10am-2pm.  UVB radiation  UVB radiation causes the most harmful effects and is more significant during the summer months. However, snow and ice can reflect UVB radiation leading to skin damage during the winter months as well. UVB radiation is responsible for tanning, burning, inflammation, delayed erythema (pinkness), pigmentation (brown spots), and skin cancer.     I recommend self monthly full body exams and yearly full body exams with a dermatology provider. If you develop a new or changing lesion please follow up for examination. Most skin cancers are pink and scaly or pink and pearly. However, we do see blue/brown/black skin cancers.  Consider the ABCDEs of melanoma when giving yourself your monthly full body exam ( don't forget the groin, buttocks, feet, toes, etc). A-asymmetry, B-borders, C-color, D-diameter, E-elevation or evolving. If you see any of these changes please follow up in clinic. If you cannot see your back I recommend purchasing a hand held mirror to use with a larger wall mirror.       Checking for Skin Cancer  You can find cancer early by checking your skin each month. There are 3 kinds of skin cancer. They are melanoma, basal cell carcinoma, and squamous cell carcinoma. Doing monthly skin checks is the best way to find new marks or skin changes. Follow the instructions below for checking your skin.   The ABCDEs of checking moles for melanoma   Check your moles or growths for signs of melanoma using ABCDE:   Asymmetry: the sides of the  mole or growth don t match  Border: the edges are ragged, notched, or blurred  Color: the color within the mole or growth varies  Diameter: the mole or growth is larger than 6 mm (size of a pencil eraser)  Evolving: the size, shape, or color of the mole or growth is changing (evolving is not shown in the images below)    Checking for other types of skin cancer  Basal cell carcinoma or squamous cell carcinoma have symptoms such as:     A spot or mole that looks different from all other marks on your skin  Changes in how an area feels, such as itching, tenderness, or pain  Changes in the skin's surface, such as oozing, bleeding, or scaliness  A sore that does not heal  New swelling or redness beyond the border of a mole    Who s at risk?  Anyone can get skin cancer. But you are at greater risk if you have:   Fair skin, light-colored hair, or light-colored eyes  Many moles or abnormal moles on your skin  A history of sunburns from sunlight or tanning beds  A family history of skin cancer  A history of exposure to radiation or chemicals  A weakened immune system  If you have had skin cancer in the past, you are at risk for recurring skin cancer.   How to check your skin  Do your monthly skin checkups in front of a full-length mirror. Check all parts of your body, including your:   Head (ears, face, neck, and scalp)  Torso (front, back, and sides)  Arms (tops, undersides, upper, and lower armpits)  Hands (palms, backs, and fingers, including under the nails)  Buttocks and genitals  Legs (front, back, and sides)  Feet (tops, soles, toes, including under the nails, and between toes)  If you have a lot of moles, take digital photos of them each month. Make sure to take photos both up close and from a distance. These can help you see if any moles change over time.   Most skin changes are not cancer. But if you see any changes in your skin, call your doctor right away. Only he or she can diagnose a problem. If you have skin  cancer, seeing your doctor can be the first step toward getting the treatment that could save your life.   [employer] last reviewed this educational content on 4/1/2019 2000-2020 [employer]. [address] [place] [address], [place] [address]. All rights reserved. This information is not intended as a substitute for professional medical care. Always follow your healthcare professional's instructions.       When should I call my doctor?  If you are worsening or not improving, please, contact us or seek urgent care as noted below.     Who should I call with questions (adults)?    [facility] [phone]  For urgent needs outside of business hours call the [facility] at [phone] and ask for the dermatology resident on call to be paged  If this is a medical emergency and you are unable to reach an ER, Call [phone]      If you need a prescription refill, please contact your pharmacy. Refills are approved or denied by our Physicians during normal business hours, Monday through Friday.  Per office policy, refills will not be granted if you have not been seen within the past year (or sooner depending on the condition).

## 2025-05-28 ENCOUNTER — VIRTUAL VISIT (OUTPATIENT)
Facility: CLINIC | Age: 70
End: 2025-05-28
Payer: COMMERCIAL

## 2025-05-28 DIAGNOSIS — F41.1 GENERALIZED ANXIETY DISORDER: Primary | ICD-10-CM

## 2025-05-28 PROCEDURE — 90837 PSYTX W PT 60 MINUTES: CPT | Mod: 95

## 2025-05-28 NOTE — CONFIDENTIAL NOTE
Noted. Pt already had appt on 4/4 with neurologist and note was reviewed by me (neurologist was not worried at that visit about NPH) and discussed with pt at her most recent follow-up visits.     Will close encounter.     Judy Pompa DO on 5/28/2025 at 12:09 PM

## 2025-05-28 NOTE — PROGRESS NOTES
[facility]                                     Progress Note    Patient Name: Jolynn Haji  Date: 5/28/2025         Service Type: Individual      Session Start Time: 10:05 am  Session End Time: 10:58 am     Session Length: 53 min    Session #: 9 (previous therapy with Erika RAIN)    Attendees: Client attended alone    Service Modality:  Video Visit:      Provider verified identity through the following two step process.  Patient provided:  Patient address    Telemedicine Visit: The patient's condition can be safely assessed and treated via synchronous audio and visual telemedicine encounter.      Reason for Telemedicine Visit: Patient convenience (e.g. access to timely appointments / distance to available provider)    Originating Site (Patient Location): Patient's home    Distant Site (Provider Location): Provider Remote Setting- Home Office    Consent:  The patient/guardian has verbally consented to: the potential risks and benefits of telemedicine (video visit) versus in person care; bill my insurance or make self-payment for services provided; and responsibility for payment of non-covered services.     Patient would like the video invitation sent by:  My Chart    Mode of Communication:  Video Conference via [facility]    Distant Location (Provider):  Off-site    As the provider I attest to compliance with applicable laws and regulations related to telemedicine.    DATA  Interactive Complexity: No  Crisis: No      Progress Since Last Session (Related to Symptoms / Goals / Homework):   Symptoms: Improving anxiety and energy    Homework: Achieved / completed to satisfaction      Episode of Care Goals: Satisfactory progress - ACTION (Actively working towards change); Intervened by reinforcing change plan / affirming steps taken     Current / Ongoing Stressors and Concerns:  Meds increase/change, seeing improvement.  Irritability when feeling out of control. MRI/CAT scan inconclusive; neurologist suggests  focus on addressing anxiety/depression. Off Venlafaxene. Current functioning, cognitive questions, overwhelm. Tired of feeling anxious     Treatment Objective(s) Addressed in This Session:   Sleep hygiene  Grounding skills, mindfulness  Gratitude practice  Anxiety triggers  Panic psychoed     Intervention:  Interpersonal Therapy: provided active listening and validation of experience with stressors. Utilized rapport building, reinforced grounding skills, sleep hygiene strategies, GLAD gratitude practice. Validated need for simplifying/downsizing. Completed ROSA ISELA mindfulness practice in session, discussed experience  DBT: Radical acceptance and Check the Facts psychoeducation reinforcement.    Assessments completed prior to visit:  The following assessments were completed by patient for this visit:  PHQ2:       5/13/2025     2:18 PM 5/3/2023     1:18 PM 3/21/2023    11:20 AM 2/23/2023     7:15 PM 1/31/2023     3:21 PM 1/12/2023     4:28 PM 12/15/2022     9:12 PM   PHQ-2 ( 1999 [employer])   Q1: Little interest or pleasure in doing things 1   0 0 0    Q2: Feeling down, depressed or hopeless 1   0 0 0    PHQ-2 Score 2   0 0 0    Q1: Little interest or pleasure in doing things    Not at all Not at all Not at all    Q2: Feeling down, depressed or hopeless    Not at all Not at all Not at all    PHQ-2 Score  Incomplete Incomplete 0 0 0 Incomplete     PHQ9:       1/8/2025    11:26 AM 2/5/2025    11:26 AM 3/1/2025     4:54 PM 4/6/2025     1:01 PM 4/10/2025     8:43 AM 4/13/2025     5:39 PM 5/27/2025     5:02 PM   PHQ-9 SCORE   PHQ-9 Total Score MyChart 11 (Moderate depression) 7 (Mild depression) 6 (Mild depression) 9 (Mild depression) 12 (Moderate depression) 4 (Minimal depression) 4 (Minimal depression)   PHQ-9 Total Score 11  7  6  9  12  4  4        Patient-reported     GAD2:       12/2/2024     5:19 PM 12/28/2024     3:34 PM 1/28/2025     3:51 PM 2/18/2025     3:52 PM 3/2/2025    12:35 PM 4/6/2025     1:06 PM 5/8/2025      4:40 PM   TIFFANY-2   Feeling nervous, anxious, or on edge 2 3 3 2 3 3 3   Not being able to stop or control worrying 1 2 1 2 1 1 0   TIFFANY-2 Total Score 3  5  4  4  4  4  3        Patient-reported     GAD7:       1/6/2025     8:49 AM 1/8/2025    11:28 AM 1/28/2025     3:51 PM 2/18/2025     3:52 PM 2/28/2025     8:30 PM 4/6/2025     1:06 PM 5/8/2025     4:40 PM   TIFFANY-7 SCORE   Total Score 9 (mild anxiety) 6 (mild anxiety) 5 (mild anxiety) 7 (mild anxiety) 7 (mild anxiety) 6 (mild anxiety) 3 (minimal anxiety)   Total Score 9  6  5  7  7  6  3        Patient-reported     CAGE-AID:       6/6/2017    12:05 PM 5/12/2022     2:26 PM   CAGE-AID Total Score   Total Score  0    0   Total Score MyChart  0 (A total score of 2 or greater is considered clinically significant)       Information is confidential and restricted. Go to Review Flowsheets to unlock data.     PROMIS 10-Global Health (only subscores and total score):       6/9/2024     8:48 PM 6/23/2024     8:01 PM 9/23/2024     9:33 AM 10/8/2024     4:11 PM 12/28/2024     3:39 PM 4/2/2025     1:09 PM 4/6/2025     1:17 PM   PROMIS-10 Scores Only   Global Mental Health Score 17 17    17 17    17    17 18 12  11  13    Global Physical Health Score 19 19    19 18    18    18 17 12  15  14    PROMIS TOTAL - SUBSCORES 36 36    36 35    35    35 35 24  26  27        Patient-reported     Howard Suicide Severity Rating Scale (Lifetime/Recent)      5/12/2022    10:07 PM 9/27/2022     2:16 PM 2/28/2024    10:11 AM   Howard Suicide Severity Rating (Lifetime/Recent)   1. Wish to be Dead (Lifetime) N Y Y   Wish to be Dead Description (Lifetime)  fleeting thought of wishing she were dead    1. Wish to be Dead (Past 1 Month)  N N   2. Non-Specific Active Suicidal Thoughts (Lifetime) N N N   Most Severe Ideation Rating (Lifetime)  1 1   Most Severe Ideation Rating (Past 1 Month)  1 1   Frequency (Lifetime)  1 1   Frequency (Past 1 Month)  1    Duration (Lifetime)  1 1   Duration (Past 1  Month)  1    Controllability (Lifetime)  1    Controllability (Past 1 Month)  1    Deterrents (Lifetime)  1    Deterrents (Past 1 Month)  1    Reasons for Ideation (Lifetime)  4 5   Reasons for Ideation (Past 1 Month)  4 0   Actual Attempt (Lifetime) N N N   Has subject engaged in non-suicidal self-injurious behavior? (Lifetime) N N N   Interrupted Attempts (Lifetime) N N N   Aborted or Self-Interrupted Attempt (Lifetime) N N N   Preparatory Acts or Behavior (Lifetime) N N N   Calculated C-SSRS Risk Score (Lifetime/Recent) No Risk Indicated No Risk Indicated No Risk Indicated         ASSESSMENT: Current Emotional / Mental Status (status of significant symptoms):   Risk status (Self / Other harm or suicidal ideation)   Patient denies current fears or concerns for personal safety.   Patient denies current or recent suicidal ideation or behaviors.   Patient denies current or recent homicidal ideation or behaviors.   Patient denies current or recent self injurious behavior or ideation.   Patient denies other safety concerns.   Patient reports there has been no change in risk factors since their last session.     Patient reports there has been no change in protective factors since their last session.     Recommended that patient call 911 or go to the local ED should there be a change in any of these risk factors     Appearance:   Appropriate    Eye Contact:   Good    Psychomotor Behavior: Normal  Restless    Attitude:   Cooperative    Orientation:   All   Speech    Rate / Production: Impoverished  Normal     Volume:  Normal    Mood:    Anxious  Normal   Affect:    Appropriate    Thought Content:  Clear    Thought Form:  Coherent  Logical  Circumstantial   Insight:    Fair      Medication Review:   Changes to psychiatric medications, see updated Medication List in EPIC.  move off Venlafaxine complete     Medication Compliance:   Yes     Changes in Health Issues:   None reported     Chemical Use Review:   Substance Use:  Chemical use reviewed, no active concerns identified      Tobacco Use: No current tobacco use.      Diagnosis:  1. Generalized anxiety disorder        Collateral Reports Completed:   Not Applicable    PLAN: (Patient Tasks / Therapist Tasks / Other)  Continue  mindfulness grounding practices, GLAD gratitude, sleep hygiene, notice anxiety triggers, leaves on a stream, little or big deal. Check the facts, radical acceptance, use mindfulness, body scan, am meditation, panic skills    DINESH Campbell  5/28/25                                                            Individual Treatment Plan    Patient's Name: Jolynn Haji  YOB: 1955    Date of Creation: 3/325  Date Treatment Plan Last Reviewed/Revised:     DSM5 Diagnoses: 300.02 (F41.1) Generalized Anxiety Disorder  Psychosocial / Contextual Factors: aging, panic  PROMIS (reviewed every 90 days): see above    Referral / Collaboration:  Referral to another professional/service is not indicated at this time..    Anticipated number of session for this episode of care: tbd  Anticipation frequency of session: Every other week  Anticipated Duration of each session: 38-52 minutes  Treatment plan will be reviewed in 90 days or when goals have been changed.       MeasurableTreatment Goal(s) related to diagnosis / functional impairment(s)  Goal 1: Patient will learn and practice skills to address anxiety    I will know I've met my goal when tbc.      Objective #A (Patient Action)    Patient will :.  Sleep hygiene  Grounding skills, mindfulness  Gratitude practice  Breathing skills  Anxiety triggers  Panic psychoed    Status: New - Date: 3/3/25     Intervention(s)  Therapist will teach sleep hygiene, grounding skills, mindfulness, gratitude practices and assign homework.      Patient has reviewed and agreed to the above plan.      DINESH Campbell  March 3, 2025                                            ______________________________________________________________________

## 2025-06-01 ASSESSMENT — ANXIETY QUESTIONNAIRES
GAD7 TOTAL SCORE: 2
IF YOU CHECKED OFF ANY PROBLEMS ON THIS QUESTIONNAIRE, HOW DIFFICULT HAVE THESE PROBLEMS MADE IT FOR YOU TO DO YOUR WORK, TAKE CARE OF THINGS AT HOME, OR GET ALONG WITH OTHER PEOPLE: SOMEWHAT DIFFICULT
4. TROUBLE RELAXING: NOT AT ALL
8. IF YOU CHECKED OFF ANY PROBLEMS, HOW DIFFICULT HAVE THESE MADE IT FOR YOU TO DO YOUR WORK, TAKE CARE OF THINGS AT HOME, OR GET ALONG WITH OTHER PEOPLE?: SOMEWHAT DIFFICULT
GAD7 TOTAL SCORE: 2
2. NOT BEING ABLE TO STOP OR CONTROL WORRYING: NOT AT ALL
7. FEELING AFRAID AS IF SOMETHING AWFUL MIGHT HAPPEN: NOT AT ALL
5. BEING SO RESTLESS THAT IT IS HARD TO SIT STILL: NOT AT ALL
3. WORRYING TOO MUCH ABOUT DIFFERENT THINGS: SEVERAL DAYS
7. FEELING AFRAID AS IF SOMETHING AWFUL MIGHT HAPPEN: NOT AT ALL
1. FEELING NERVOUS, ANXIOUS, OR ON EDGE: SEVERAL DAYS
6. BECOMING EASILY ANNOYED OR IRRITABLE: NOT AT ALL

## 2025-06-03 ENCOUNTER — OFFICE VISIT (OUTPATIENT)
Dept: FAMILY MEDICINE | Facility: CLINIC | Age: 70
End: 2025-06-03
Payer: COMMERCIAL

## 2025-06-03 VITALS
HEIGHT: 68 IN | HEART RATE: 75 BPM | BODY MASS INDEX: 18.94 KG/M2 | TEMPERATURE: 98 F | RESPIRATION RATE: 18 BRPM | DIASTOLIC BLOOD PRESSURE: 82 MMHG | SYSTOLIC BLOOD PRESSURE: 128 MMHG | WEIGHT: 125 LBS | OXYGEN SATURATION: 98 %

## 2025-06-03 DIAGNOSIS — F39 MOOD DISORDER: ICD-10-CM

## 2025-06-03 DIAGNOSIS — R93.0 ABNORMAL CT OF THE HEAD: ICD-10-CM

## 2025-06-03 DIAGNOSIS — I10 ESSENTIAL HYPERTENSION WITH GOAL BLOOD PRESSURE LESS THAN 140/90: ICD-10-CM

## 2025-06-03 DIAGNOSIS — F41.1 GAD (GENERALIZED ANXIETY DISORDER): ICD-10-CM

## 2025-06-03 DIAGNOSIS — G31.9 VENTRICULAR ENLARGEMENT DUE TO BRAIN ATROPHY: ICD-10-CM

## 2025-06-03 DIAGNOSIS — F09 MILD COGNITIVE DISORDER: ICD-10-CM

## 2025-06-03 DIAGNOSIS — F33.0 MAJOR DEPRESSIVE DISORDER, RECURRENT, MILD: ICD-10-CM

## 2025-06-03 DIAGNOSIS — R42 DIZZINESS: ICD-10-CM

## 2025-06-03 DIAGNOSIS — R51.9 CHRONIC DAILY HEADACHE: ICD-10-CM

## 2025-06-03 DIAGNOSIS — I95.1 ORTHOSTATIC HYPOTENSION: ICD-10-CM

## 2025-06-03 DIAGNOSIS — R25.1 TREMOR: ICD-10-CM

## 2025-06-03 DIAGNOSIS — F43.23 ADJUSTMENT DISORDER WITH MIXED ANXIETY AND DEPRESSED MOOD: Primary | ICD-10-CM

## 2025-06-03 PROCEDURE — G2211 COMPLEX E/M VISIT ADD ON: HCPCS | Performed by: FAMILY MEDICINE

## 2025-06-03 PROCEDURE — 3079F DIAST BP 80-89 MM HG: CPT | Performed by: FAMILY MEDICINE

## 2025-06-03 PROCEDURE — 3074F SYST BP LT 130 MM HG: CPT | Performed by: FAMILY MEDICINE

## 2025-06-03 PROCEDURE — 99215 OFFICE O/P EST HI 40 MIN: CPT | Performed by: FAMILY MEDICINE

## 2025-06-03 RX ORDER — SERTRALINE HYDROCHLORIDE 25 MG/1
25 TABLET, FILM COATED ORAL DAILY
Qty: 90 TABLET | Refills: 1 | Status: SHIPPED | OUTPATIENT
Start: 2025-06-03

## 2025-06-03 RX ORDER — SERTRALINE HYDROCHLORIDE 100 MG/1
100 TABLET, FILM COATED ORAL DAILY
Qty: 90 TABLET | Refills: 1 | Status: SHIPPED | OUTPATIENT
Start: 2025-06-03

## 2025-06-03 ASSESSMENT — ENCOUNTER SYMPTOMS
NERVOUS/ANXIOUS: 1
HEADACHES: 1

## 2025-06-03 ASSESSMENT — PATIENT HEALTH QUESTIONNAIRE - PHQ9
SUM OF ALL RESPONSES TO PHQ QUESTIONS 1-9: 5
10. IF YOU CHECKED OFF ANY PROBLEMS, HOW DIFFICULT HAVE THESE PROBLEMS MADE IT FOR YOU TO DO YOUR WORK, TAKE CARE OF THINGS AT HOME, OR GET ALONG WITH OTHER PEOPLE: SOMEWHAT DIFFICULT
SUM OF ALL RESPONSES TO PHQ QUESTIONS 1-9: 5

## 2025-06-03 ASSESSMENT — ANXIETY QUESTIONNAIRES
8. IF YOU CHECKED OFF ANY PROBLEMS, HOW DIFFICULT HAVE THESE MADE IT FOR YOU TO DO YOUR WORK, TAKE CARE OF THINGS AT HOME, OR GET ALONG WITH OTHER PEOPLE?: SOMEWHAT DIFFICULT
7. FEELING AFRAID AS IF SOMETHING AWFUL MIGHT HAPPEN: NOT AT ALL
6. BECOMING EASILY ANNOYED OR IRRITABLE: NOT AT ALL
2. NOT BEING ABLE TO STOP OR CONTROL WORRYING: NOT AT ALL
3. WORRYING TOO MUCH ABOUT DIFFERENT THINGS: SEVERAL DAYS
GAD7 TOTAL SCORE: 4
5. BEING SO RESTLESS THAT IT IS HARD TO SIT STILL: NOT AT ALL
GAD7 TOTAL SCORE: 4
IF YOU CHECKED OFF ANY PROBLEMS ON THIS QUESTIONNAIRE, HOW DIFFICULT HAVE THESE PROBLEMS MADE IT FOR YOU TO DO YOUR WORK, TAKE CARE OF THINGS AT HOME, OR GET ALONG WITH OTHER PEOPLE: SOMEWHAT DIFFICULT
7. FEELING AFRAID AS IF SOMETHING AWFUL MIGHT HAPPEN: NOT AT ALL
GAD7 TOTAL SCORE: 4
4. TROUBLE RELAXING: NOT AT ALL
1. FEELING NERVOUS, ANXIOUS, OR ON EDGE: NEARLY EVERY DAY

## 2025-06-03 NOTE — PROGRESS NOTES
[facility]  [address] [place] [address]  Office: [phone]   Fax:    [phone]       Assessment & Plan :       ICD-10-CM    1. Adjustment disorder with mixed anxiety and depressed mood- mild  at this time  F43.23       2. TIFFANY (generalized anxiety disorder)  F41.1 sertraline (ZOLOFT) 100 MG tablet     sertraline (ZOLOFT) 25 MG tablet      3. Tremor  R25.1       4. Mood disorder  F39 sertraline (ZOLOFT) 100 MG tablet      5. Major depressive disorder, recurrent, mild  F33.0       6. Essential hypertension with goal blood pressure less than 140/90 - then Orthostatic hypotension- back on lisinopril 2.5mg daily prn and doing daily 2.25 mile walks with her dog  I10       7. Orthostatic hypotension- resolved off of lisinopril 2.5mg daily intially, now back and doing daily 2.25 mile walks with her dog  I95.1       8. Dizziness- unknown etiology- ? orthostatic vs. vertigo (BPPV)  vs. other- pt declines referral for Tilt Table test  R42       9. Mild cognitive disorder- ? due to depression and anxiety per [facility] - Dr. Silverio Amato  F09       10. Chronic daily headache- since depression started 2+ years ago - offered  referral to see Dr. Denisa Mcdonough- [facility] - pt will think about it  R51.9       11. Abnormal CT of the head- 3/6/2025 compared with 4/14/2017 -No acute intracranial abn, Similar dilated ventricular system >'er than expected for parenchymal volume loss ? normal pressure hydrocephalus.  R93.0     saw [facility] - Dr. Silverio Amato 4/4/2025 = didn't think pt had signs of NPH      12. Ventricular enlargement due to brain atrophy -ventricular enlargement is greater than expected for parenchymal volume loss per CT 3/6/2025 compared with 4/14/2017  G31.9           pt desires to decrease to 125mg daily and use the 25mg tabs instead of 1/2 of a 50mg     Pt will Please call Radiology at [facility] - [phone]  to get 2- CD copies of your CT's of  your head from 3/6/2025 and 4/14/2017 - 1 copy to bring down to [place] with you and the other copy to Drop off to Dr. Amato's Neurology office in [place].  Consider 2nd opinion from another Neurologist - ? At [place]?     Consider getting tilt table test vs. Vestibular PT eval and treat vs. Referral to [facility] in [place]. Pt declined all 3 of those today.     Follow-up : Return in about 9 weeks (around 8/5/2025) for depression, anxiety, blood pressure, w/ Dr. SUE for 40 min appt. - appt made.      60 minutes spent by me on the date of the encounter doing chart review, history and exam, documentation and further activities per the note.  48 minutes face to face time.   Assessment & Plan  Panic Attacks  - She has consistent panic attacks with no change in frequency but increased awareness of symptoms. Symptoms include shortness of breath and rapid heartbeat. Consider a second opinion at [facility]. Appointment scheduled for June 18, 2025. Maintain current medication regimen until after the [facility] visit.    Mood Disorder  - Her current mood balance could be improved. Considering a second opinion for further evaluation. Appointment at [facility] on June 18, 2025, for a second opinion.    Major Depressive Disorder, Recurrent, Mild  - Symptoms are managed with sertraline. Discussed the possibility of reducing the sertraline dose to 125 mg. Maintain the current dose until after the [facility] visit.    Essential Hypertension  - Her blood pressure is well-controlled at 128/82 with the current medication regimen. Continue current medications including propranolol 60 mg extended release daily and lisinopril as needed.    Orthostatic Hypotension  - Dizziness upon standing persists despite not taking lisinopril regularly. Consider further testing such as a tilt table test or vestibular physical therapy after the [facility] visit.    Dizziness  - Described as both spinning and  "lightheadedness, possibly related to orthostatic hypotension or other causes. Consider evaluation at the [facility] or vestibular physical therapy after the [facility] visit.    Abnormal CT of the Head  - Previous CT scan showed no acute intracranial abnormality but suggested correlation with symptoms of normal pressure hydrocephalus. Obtain CD copies of CT scans from March 6, 2025, and April 14, 2017, to bring to [facility] for further evaluation.    The longitudinal plan of care for the diagnosis(es)/condition(s) as documented were addressed during this visit. Due to the added complexity in care, I will continue to support Azalea in the subsequent management and with ongoing continuity of care.    \"Consent was obtained from the patient to use an AI scribe/documentation tool in the creation of this note.This note/dictation was performed and completed with the assistance of voice recognition software and an AI scribe. It may contain inadvertant transcription  errors,  omissions and/or  inadvertent word substitution.\" --Helga Ibarra MD           Subjective   Azalea is a 69 year old, presenting for the following health issues:  Anxiety, Depression, and Headache  and the following other medical problems:      1. Adjustment disorder with mixed anxiety and depressed mood- mild  at this time    2. TIFFANY (generalized anxiety disorder)    3. Tremor    4. Mood disorder    5. Major depressive disorder, recurrent, mild    6. Essential hypertension with goal blood pressure less than 140/90 - then Orthostatic hypotension- back on lisinopril 2.5mg daily prn and doing daily 2.25 mile walks with her dog    7. Orthostatic hypotension- resolved off of lisinopril 2.5mg daily intially, now back and doing daily 2.25 mile walks with her dog    8. Dizziness- unknown etiology- ? orthostatic vs. vertigo (BPPV)  vs. other- pt declines referral for Tilt Table test    9. Mild cognitive disorder- ? due to " "depression and anxiety per [facility] - Dr. Silverio Amato    10. Chronic daily headache- since depression started 2+ years ago - offered  referral to see Dr. Denisa Mcdonough- [facility] - pt will think about it    11. Abnormal CT of the head- 3/6/2025 compared with 4/14/2017 -No acute intracranial abn, Similar dilated ventricular system >'er than expected for parenchymal volume loss ? normal pressure hydrocephalus.    12. Ventricular enlargement due to brain atrophy -ventricular enlargement is greater than expected for parenchymal volume loss per CT 3/6/2025 compared with 4/14/2017            6/3/2025     3:05 PM   Additional Questions   Roomed by Theresa LOGAN     Anxiety  Associated symptoms include headaches.   Headache     History of Present Illness       Mental Health Follow-up:  Patient presents to follow-up on Depression & Anxiety (Discuss dosage on her Zoloft was on 125mg previously increasing it makes her feel off- but was having more panic attacks on the lower dose at 125 mg.).Patient's depression since last visit has been:  Medium  The patient is having other symptoms associated with depression.  Patient's anxiety since last visit has been:  Medium  The patient is having other symptoms associated with anxiety.  Any significant life events: health concerns  Patient is feeling anxious or having panic attacks.  Patient has no concerns about alcohol or drug use.    Headaches:   Since the patient's last clinic visit, headaches are: worsened  The patient is getting headaches:  Every day  She is not able to do normal daily activities when she has a migraine.  The patient is taking the following rescue/relief medications:  Tylenol   Patient states \"I get some relief\" from the rescue/relief medications.   The patient is taking the following medications to prevent migraines:  No medications to prevent migraines  In the past 4 weeks, the patient has gone to an Urgent Care or Emergency Room 0 times times due " to headaches.    She eats 2-3 servings of fruits and vegetables daily.She consumes 0 sweetened beverage(s) daily.She exercises with enough effort to increase her heart rate 20 to 29 minutes per day.  She exercises with enough effort to increase her heart rate 3 or less days per week.   She is taking medications regularly.      Went off her trazodone about 2 months ago. Sleeping well without it.   Headaches have been worse in the last 2 months as well.  Started having more panic attacks when she stopped the trazodone. Every day has panic attacks. Every day gets a squeezing feelilng in the lower center of her chest that radiates down to her mid abdomen.  Never gets the feeling when she's exerting herself or exercising.     Has appointment with a male psychiatrist at the [facility] on 6/18/2025 for another opinion.     Saw Neurology previously for her chronic headaches.   History of Present Illness-  Panic Attacks  - She has had panic attacks since day one, with no change in frequency recently. Symptoms include shortness of breath and rapid heartbeat. Headaches are associated with panic attacks, which occur in response to unexpected or undesired events.    Headaches  - Her headaches have worsened since stopping trazodone a couple of months ago. They are described as a squeezing sensation starting in the middle of the chest and moving down into the gut. The headaches last a couple of minutes and are alleviated by thinking through the cause and taking deep breaths.    Dizziness  - She experiences dizziness when changing positions, such as sitting to standing or getting out of a car after a long ride. The dizziness is described as both spinning and lightheadedness, feeling like fainting. She has not been taking lisinopril regularly, which could contribute to dizziness.    Sleep Issues  - She stopped taking trazodone over a month ago after discussing it with Doctor Pompa. Her sleep has been fine since discontinuing  trazodone.    Family Stress  - She recently had company at [employer], which was overwhelming but managed with help from others. Her son Robbie, age 39, recently got engaged to [employer]; they met at a summer work event.    Skin Cancer History  - She had basal cell carcinoma removed from her nose recently by Doctor Ken.    Mood Concerns  - She feels that her mood balance could be better and is seeking a second opinion for further evaluation.        4/13/2025     5:39 PM 5/27/2025     5:02 PM 6/3/2025     9:54 AM   PHQ   PHQ-9 Total Score 4  4  5    Q9: Thoughts of better off dead/self-harm past 2 weeks Not at all Not at all Not at all       Patient-reported          5/8/2025     4:40 PM 6/1/2025     3:44 PM 6/3/2025     9:57 AM   TIFFANY-7 SCORE   Total Score 3 (minimal anxiety) 2 (minimal anxiety) 4 (minimal anxiety)   Total Score 3  2  4        Patient-reported        Patient Active Problem List   Diagnosis    Essential hypertension with goal blood pressure less than 140/90 - then Orthostatic hypotension- resolved off of lisinopril 2.5mg daily and doing daily 2.25 mile walks with her dog    Hidradenitis    Localized osteoarthritis of hand    Synovial cyst of popliteal space    DERMATOPHYTOSIS OF NAIL- toenails     Lipidosis    Osteopenia    Postmenopausal    NUMBNESS AND TINGLING OF FOOT - bilateral -mild     Numbness of feet- distal feet R>L     Stress incontinence, female - mild    Cough    Acute bronchitis- recurrent- ? related to mold in school building- pt has since retired and no further bronchitis    Rosacea    History of migraine headaches- assoc. with nausea/vomiting - resolved around menopause - were  premenstrual     Dupuytren's disease of palm - right 4th digit flexor tendon    Sun-damaged skin    Seasonal allergic rhinitis    Basal cell carcinoma of leg, right    Adjustment disorder with mixed anxiety and depressed mood- mild  at this time    Family history of celiac disease    TIFFANY (generalized anxiety  disorder)    Insomnia, unspecified type    Chronic constipation    Raynaud's phenomenon without gangrene    Other secondary osteoarthritis of left knee    Primary localized osteoarthrosis of left lower leg    Hyperlipidemia LDL goal <130    Basal cell carcinoma    Colon polyps    Screening for cervical cancer    Adjustment insomnia - resolved as of 5/5/2023 - off of ambien with psychiatry's okay     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 - much improved -mostly  resolved as of 5/5/2023 - slight presence noted 3/5/2025    Drug-induced parkinsonism (H)- Abilify - generic = ARIPiprazole( 5mg) - RESOLVED OFF ABILIFY     Mood disorder    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 Denisa Mcdonough- [facility] - pt will think about it       Current Outpatient Medications   Medication Sig Dispense Refill    clindamycin (CLEOCIN T) 1 % external lotion Apply topically 2 times daily. For acne flares 60 mL 3    lisinopril (ZESTRIL) 2.5 MG tablet Take 1 tablet (2.5 mg) by mouth daily as needed (BP >135/85). 90 tablet 3    propranolol ER (INDERAL LA) 60 MG 24 hr capsule TAKE ONE CAPSULE BY MOUTH ONCE DAILY 90 capsule 3    sertraline (ZOLOFT) 100 MG tablet Take 1 tablet (100 mg) by mouth daily. Take WITH 50 mg tab for total daily dose 150 mg. 90 tablet 1    sertraline (ZOLOFT) 50 MG tablet Take 1 tablet (50 mg) by mouth daily. Take WITH 100 mg tab for total daily  "dose 150 mg. 90 tablet 1    traZODone (DESYREL) 50 MG tablet Take 0.5-1 tablets (25-50 mg) by mouth nightly as needed for sleep. 90 tablet 1          Allergies   Allergen Reactions    Abilify [Aripiprazole] Other (See Comments)     Suspected drug-induced Parkinsonism     Fosamax Diarrhea     Diarrhea, stomach cramps, jaw pain     Amoxicillin-Pot Clavulanate Itching and Rash    Amoxicillin-Pot Clavulanate Itching and Rash            Review of Systems  Constitutional, HEENT, cardiovascular, pulmonary, GI, , musculoskeletal, neuro, skin, endocrine and psych systems are negative, except as otherwise noted.      Objective    /82   Pulse 75   Temp 98  F (36.7  C)   Resp 18   Ht 1.727 m (5' 8\")   Wt 56.7 kg (125 lb)   LMP 06/28/2001   SpO2 98%   BMI 19.01 kg/m    Body mass index is 19.01 kg/m .  Physical Exam   GENERAL: alert and no distress  EYES: Eyes grossly normal to inspection,  and conjunctivae and sclerae normal  HENT:   nose and mouth without ulcers or lesions  NECK: no adenopathy, no asymmetry, masses, or scars  RESP: lungs audibly clear  - no rales, rhonchi or wheezes  CV: regular rate and rhythm, normal S1 S2, no S3 or S4, no murmur, click or rub, no peripheral edema  MS: no gross musculoskeletal defects noted, no edema  SKIN: no suspicious lesions or rashes  NEURO: Normal strength and tone, mentation intact and speech normal  PSYCH: mentation appears just slightly slower than her previous normal, affect still a bit blunted over her previous very bright affect prior to her severe depression several years ago.  Still a bit slow to make decisions and sometimes to formulate thoughts.  This has plateaued over the last year or so.    Alert and oriented. No acute distress. Appears well-groomed and casually dressed. Affect is very mildly  depressed. In good humor and laughs appropriately. Not particularly anxious. No evidence of psychosis.     Office Visit on 03/04/2025   Component Date Value Ref Range " "Status    Case Report 03/04/2025    Final                    Value:Surgical Pathology Report                         Case: [id number]                                  Authorizing Provider:  Ligia Carbajal PA-C    Collected:           03/04/2025 10:50 AM          Ordering Location:     [facility]   Received:            03/04/2025 11:17 AM                                 [facility]                                                           Pathologist:           Zach Qiu MD                                                       Specimens:   A) - Skin, left forearm                                                                             B) - Skin, left upper arm                                                                           C) - Skin, left ala                                                                        Final Diagnosis 03/04/2025    Final                    Value:A. Left forearm:  - Squamous cell carcinoma in situ - (see description)     B. Left upper arm:  - Basal cell carcinoma, superficial type - (see description)     C. Left ala:  - Basal cell carcinoma, nodular type - (see description)        Clinical Information 03/04/2025    Final                    Value:The patient is a 69 year old female.       Gross Description 03/04/2025    Final                    Value:A(1). Skin, left forearm:  The specimen is received in formalin with proper patient identification, labeled \"L forearm\".  The specimen consists of a 0.7 x 0.5 cm white-tan skin shave remarkable for a 0.4 x 0.4 cm brown-tan lesion.  The subcutaneous surface is inked black, and the specimen is serially sectioned and entirely submitted in cassette A1.   B(2). Skin, left upper arm:  The specimen is received in formalin with proper patient identification, labeled \"L upper arm\".  The specimen consists of a 0.7 x 0.5 cm white-tan skin shave remarkable for a 0.1 cm brown-tan lesion.  The subcutaneous " "surface is inked black, and the specimen is serially sectioned and entirely submitted in cassette B1.   C(3). Skin, left ala:  The specimen is received in formalin with proper patient identification, labeled \"L nasal ala\".  The specimen consists of a 0.5 x 0.2 cm white-tan skin shave remarkable for a 0.1 cm brown-tan lesion.  The subcutaneous surface is inked black, and the specimen is bisected                           and entirely submitted in cassette C1.       Microscopic Description 03/04/2025    Final                    Value:A. The specimen exhibits epidermal hyperplasia with full-thickness keratinocyte atypia and suprabasal mitoses.    B. The specimen exhibits a tumor of atypical basaloid epithelium arranged as buds off the epidermal undersurface with fibrotic stroma and clefting artifact.   C. The specimen exhibits small buds of BerEP4 positive epithelium emanating from the epidermal undersurface and a dermal keyhole sign suggestive of tumor which has dropped out of the specimen. These features are highly suggestive of basal cell carcinoma.      MCRS 03/04/2025 Yes (A)  N/A Final    Performing Labs 03/04/2025    Final                    Value:The technical component of this testing was completed at [facility].    Stain controls for all stains resulted within this report have been reviewed and show appropriate reactivity.              Signed Electronically by: Helga Ibarra MD    "

## 2025-06-03 NOTE — PATIENT INSTRUCTIONS
[facility]  [address] [place] [address]  Office: [phone]   Fax:    [phone]       Please call Radiology at [facility] - [phone]  to get 2- CD copies of your CT's of your head from 3/6/2025 and 4/14/2017 to bring down to [place] with you and to Drop off to Dr. Amato's Neurology office in [place].     Please, call our clinic or go to the ER immediately if signs or symptoms worsen or fail to improve as anticipated.     pt desires to decrease to 125mg daily and use the 25mg tabs instead of 1/2 of a 50mg     Consider getting tilt table test vs. Vestibular PT eval and treat vs. Referral to [facility] in [place].     Please, call our clinic or go to the ER immediately if signs or symptoms worsen or fail to improve as anticipated.     Based on our discussion, I have outlined the following instructions for you:      - You have an appointment at the [facility] on June 18, 2025, to get another opinion about your panic attacks and mood disorder.  - decrease your sertraline from 150mg daily to 125mg daily per your request, otherwise Keep taking your current medications as usual until you visit the [facility].  - Keep taking your blood pressure medication - lisinopril when you need it. Propranolol XR 60mg daily is to help with your anxiety, not necessarily your blood pressure.   - After your [facility] visit, you might need to do some tests like a tilt table test or try vestibular physical therapy to help with dizziness when you stand up.  - After your [facility] visit, you might also consider going to the [facility] or trying vestibular physical therapy to help with your dizziness.    Thank you again for your visit, and we look forward to supporting you in your journey to better health.     Thank you so much or choosing [facility]  for your Health Care. It was a pleasure seeing you at your  "visit today! Please contact us with any questions or concerns you may have.                   Helga Ibarra MD                              To reach your [facility] care team after hours call:   [phone] press #2 \"to speak with your care team\".  This will get you to our clinic instead of routing to central [facility]  scheduling.     PLEASE NOTE OUR HOURS HAVE CHANGED secondary to COVID-19 coronavirus pandemic, as we are trying to minimize patient exposure to the virus,  which is now widespread in the [facility].  These hours may change with very little notice.  We apologize for any inconvenience.       Our current clinic hours are:          Monday- Thursday   7:00am - 6:00pm  in person.      Friday  7:00am- 5:00pm                       Saturday and Sunday : Closed to in person and virtual visits        We have telephone and virtual visit times available between    7:00am - 6pm on Monday-Friday as well.                                                Phone:  [phone]      Our pharmacy hours: Monday through Friday 8:00am to 5:00pm                        Saturday - 9:00 am to 12 noon       Sunday : Closed.              Phone:  [phone]              ###  Please note: at this time we are not accepting any walk-in visits. ###      There is also information available at our web site:  [email or web address]    If your provider ordered any lab tests and you do not receive the results within 10 business days, please call the clinic.    If you need a medication refill please contact your pharmacy.  Please allow 3 business days for your refill to be completed.    Our clinic offers telephone visits and e visits.  Please ask one of your team members to explain more.      Use [employer] (secure email communication and access to your chart) to send your primary care provider a message or make an appointment. Ask someone on your Team how to sign up for [employer].                          "

## 2025-06-04 ASSESSMENT — ANXIETY QUESTIONNAIRES: GAD7 TOTAL SCORE: 4

## 2025-06-08 ASSESSMENT — PATIENT HEALTH QUESTIONNAIRE - PHQ9
10. IF YOU CHECKED OFF ANY PROBLEMS, HOW DIFFICULT HAVE THESE PROBLEMS MADE IT FOR YOU TO DO YOUR WORK, TAKE CARE OF THINGS AT HOME, OR GET ALONG WITH OTHER PEOPLE: SOMEWHAT DIFFICULT
SUM OF ALL RESPONSES TO PHQ QUESTIONS 1-9: 5
SUM OF ALL RESPONSES TO PHQ QUESTIONS 1-9: 5

## 2025-06-09 ENCOUNTER — VIRTUAL VISIT (OUTPATIENT)
Facility: CLINIC | Age: 70
End: 2025-06-09
Payer: COMMERCIAL

## 2025-06-09 DIAGNOSIS — F41.1 GENERALIZED ANXIETY DISORDER: Primary | ICD-10-CM

## 2025-06-09 PROCEDURE — 90837 PSYTX W PT 60 MINUTES: CPT | Mod: 95

## 2025-06-09 NOTE — PROGRESS NOTES
[facility]                                     Progress Note    Patient Name: Jolynn Haji  Date: 6/9/2025         Service Type: Individual      Session Start Time: 3:00 pm  Session End Time: 3:53 pm     Session Length: 53 min    Session #: 10  (previous therapy with Erika RAIN)    Attendees: Client attended alone    Service Modality:  Video Visit:      Provider verified identity through the following two step process.  Patient provided:  Patient address    Telemedicine Visit: The patient's condition can be safely assessed and treated via synchronous audio and visual telemedicine encounter.      Reason for Telemedicine Visit: Patient convenience (e.g. access to timely appointments / distance to available provider)    Originating Site (Patient Location): Patient's home    Distant Site (Provider Location): Provider Remote Setting- Home Office    Consent:  The patient/guardian has verbally consented to: the potential risks and benefits of telemedicine (video visit) versus in person care; bill my insurance or make self-payment for services provided; and responsibility for payment of non-covered services.     Patient would like the video invitation sent by:  My Chart    Mode of Communication:  Video Conference via [facility]    Distant Location (Provider):  Off-site    As the provider I attest to compliance with applicable laws and regulations related to telemedicine.    DATA  Interactive Complexity: No  Crisis: No      Progress Since Last Session (Related to Symptoms / Goals / Homework):   Symptoms: Improving anxiety and energy    Homework: Achieved / completed to satisfaction      Episode of Care Goals: Satisfactory progress - ACTION (Actively working towards change); Intervened by reinforcing change plan / affirming steps taken     Current / Ongoing Stressors and Concerns:  Meds increase/change, difficulty with recent change - stopped Zoloft increase.  Irritability when feeling out of control. MRI/CAT  scan inconclusive; neurologist suggests focus on addressing anxiety/depression. Current functioning, cognitive questions, overwhelm. Tired of feeling anxious     Treatment Objective(s) Addressed in This Session:   Sleep hygiene  Grounding skills, mindfulness  Gratitude practice  Anxiety triggers  Panic psychoed     Intervention:  Interpersonal Therapy: provided active listening and validation of experience with stressors. Utilized rapport building, reinforced grounding skills, sleep hygiene strategies, GLAD gratitude practice. Reviewed checking the facts, applied to upcoming events. Validated getting a second opinion at [place].     Assessments completed prior to visit:  The following assessments were completed by patient for this visit:  PHQ2:       5/13/2025     2:18 PM 5/3/2023     1:18 PM 3/21/2023    11:20 AM 2/23/2023     7:15 PM 1/31/2023     3:21 PM 1/12/2023     4:28 PM 12/15/2022     9:12 PM   PHQ-2 ( 1999 [employer])   Q1: Little interest or pleasure in doing things 1   0 0 0    Q2: Feeling down, depressed or hopeless 1   0 0 0    PHQ-2 Score 2   0 0 0    Q1: Little interest or pleasure in doing things    Not at all Not at all Not at all    Q2: Feeling down, depressed or hopeless    Not at all Not at all Not at all    PHQ-2 Score  Incomplete Incomplete 0 0 0 Incomplete     PHQ9:       3/1/2025     4:54 PM 4/6/2025     1:01 PM 4/10/2025     8:43 AM 4/13/2025     5:39 PM 5/27/2025     5:02 PM 6/3/2025     9:54 AM 6/8/2025     4:56 PM   PHQ-9 SCORE   PHQ-9 Total Score MyChart 6 (Mild depression) 9 (Mild depression) 12 (Moderate depression) 4 (Minimal depression) 4 (Minimal depression) 5 (Mild depression) 5 (Mild depression)   PHQ-9 Total Score 6  9  12  4  4  5  5        Patient-reported     GAD2:       12/28/2024     3:34 PM 1/28/2025     3:51 PM 2/18/2025     3:52 PM 3/2/2025    12:35 PM 4/6/2025     1:06 PM 5/8/2025     4:40 PM 6/8/2025     4:57 PM   TIFFANY-2   Feeling nervous, anxious, or on edge 3 3 2 3 3 3 2    Not being able to stop or control worrying 2 1 2 1 1 0 0   TIFFANY-2 Total Score 5  4  4  4  4  3  2        Patient-reported     GAD7:       1/28/2025     3:51 PM 2/18/2025     3:52 PM 2/28/2025     8:30 PM 4/6/2025     1:06 PM 5/8/2025     4:40 PM 6/1/2025     3:44 PM 6/3/2025     9:57 AM   TIFFANY-7 SCORE   Total Score 5 (mild anxiety) 7 (mild anxiety) 7 (mild anxiety) 6 (mild anxiety) 3 (minimal anxiety) 2 (minimal anxiety) 4 (minimal anxiety)   Total Score 5  7  7  6  3  2  4        Patient-reported     CAGE-AID:       6/6/2017    12:05 PM 5/12/2022     2:26 PM   CAGE-AID Total Score   Total Score  0    0   Total Score MyChart  0 (A total score of 2 or greater is considered clinically significant)       Information is confidential and restricted. Go to Review Flowsheets to unlock data.     PROMIS 10-Global Health (only subscores and total score):       6/9/2024     8:48 PM 6/23/2024     8:01 PM 9/23/2024     9:33 AM 10/8/2024     4:11 PM 12/28/2024     3:39 PM 4/2/2025     1:09 PM 4/6/2025     1:17 PM   PROMIS-10 Scores Only   Global Mental Health Score 17 17    17 17    17    17 18 12  11  13    Global Physical Health Score 19 19    19 18    18    18 17 12  15  14    PROMIS TOTAL - SUBSCORES 36 36    36 35    35    35 35 24  26  27        Patient-reported     [place] Suicide Severity Rating Scale (Lifetime/Recent)      5/12/2022    10:07 PM 9/27/2022     2:16 PM 2/28/2024    10:11 AM   [place] Suicide Severity Rating (Lifetime/Recent)   1. Wish to be Dead (Lifetime) N Y Y   Wish to be Dead Description (Lifetime)  fleeting thought of wishing she were dead    1. Wish to be Dead (Past 1 Month)  N N   2. Non-Specific Active Suicidal Thoughts (Lifetime) N N N   Most Severe Ideation Rating (Lifetime)  1 1   Most Severe Ideation Rating (Past 1 Month)  1 1   Frequency (Lifetime)  1 1   Frequency (Past 1 Month)  1    Duration (Lifetime)  1 1   Duration (Past 1 Month)  1    Controllability (Lifetime)  1    Controllability  (Past 1 Month)  1    Deterrents (Lifetime)  1    Deterrents (Past 1 Month)  1    Reasons for Ideation (Lifetime)  4 5   Reasons for Ideation (Past 1 Month)  4 0   Actual Attempt (Lifetime) N N N   Has subject engaged in non-suicidal self-injurious behavior? (Lifetime) N N N   Interrupted Attempts (Lifetime) N N N   Aborted or Self-Interrupted Attempt (Lifetime) N N N   Preparatory Acts or Behavior (Lifetime) N N N   Calculated C-SSRS Risk Score (Lifetime/Recent) No Risk Indicated No Risk Indicated No Risk Indicated         ASSESSMENT: Current Emotional / Mental Status (status of significant symptoms):   Risk status (Self / Other harm or suicidal ideation)   Patient denies current fears or concerns for personal safety.   Patient denies current or recent suicidal ideation or behaviors.   Patient denies current or recent homicidal ideation or behaviors.   Patient denies current or recent self injurious behavior or ideation.   Patient denies other safety concerns.   Patient reports there has been no change in risk factors since their last session.     Patient reports there has been no change in protective factors since their last session.     Recommended that patient call 911 or go to the local ED should there be a change in any of these risk factors     Appearance:   Appropriate    Eye Contact:   Good    Psychomotor Behavior: Normal  Restless    Attitude:   Cooperative    Orientation:   All   Speech    Rate / Production: Impoverished  Normal     Volume:  Normal    Mood:    Anxious  Normal   Affect:    Appropriate    Thought Content:  Clear    Thought Form:  Coherent  Logical  Circumstantial   Insight:    Fair      Medication Review:   Changes to psychiatric medications, see updated Medication List in EPIC.  move off Venlafaxine complete     Medication Compliance:   Yes     Changes in Health Issues:   None reported     Chemical Use Review:   Substance Use: Chemical use reviewed, no active concerns identified       Tobacco Use: No current tobacco use.      Diagnosis:  1. Generalized anxiety disorder      Collateral Reports Completed:   Not Applicable    PLAN: (Patient Tasks / Therapist Tasks / Other)  Continue  mindfulness grounding practices, GLAD gratitude, sleep hygiene, notice anxiety triggers, leaves on a stream, little or big deal. Check the facts, radical acceptance, use mindfulness, body scan, am meditation, panic skills    DINESH Campbell  6/9/25                                                            Individual Treatment Plan    Patient's Name: Jolynn Haji  YOB: 1955    Date of Creation: 3/3/25  Date Treatment Plan Last Reviewed/Revised: 6/12/25    DSM5 Diagnoses: 300.02 (F41.1) Generalized Anxiety Disorder  Psychosocial / Contextual Factors: aging, panic  PROMIS (reviewed every 90 days): see above    Referral / Collaboration:  Referral to another professional/service is not indicated at this time..    Anticipated number of session for this episode of care: tbd  Anticipation frequency of session: Every other week  Anticipated Duration of each session: 38-52 minutes  Treatment plan will be reviewed in 90 days or when goals have been changed.       MeasurableTreatment Goal(s) related to diagnosis / functional impairment(s)  Goal 1: Patient will learn and practice skills to address anxiety    I will know I've met my goal when tbc.      Objective #A (Patient Action)    Patient will :.  Sleep hygiene  Grounding skills, mindfulness  Gratitude practice  Breathing skills  Anxiety triggers  Panic psychoed    Status: Continued - Date(s):    6/9/25    Intervention(s)  Therapist will teach sleep hygiene, grounding skills, mindfulness, gratitude practices and assign homework.      Patient has reviewed and agreed to the above plan.      DINESH Campbell  June 9, 2025                                           ______________________________________________________________________

## 2025-06-10 ENCOUNTER — TELEPHONE (OUTPATIENT)
Dept: FAMILY MEDICINE | Facility: CLINIC | Age: 70
End: 2025-06-10
Payer: COMMERCIAL

## 2025-06-10 NOTE — TELEPHONE ENCOUNTER
Pt calling stating she has an appointment with the [facility] - she wants to know her allergies     Rn reviewed them     Shani Bunch RN, BSN  [facility]

## 2025-06-18 ENCOUNTER — MYC MEDICAL ADVICE (OUTPATIENT)
Dept: PSYCHIATRY | Facility: CLINIC | Age: 70
End: 2025-06-18
Payer: COMMERCIAL

## 2025-06-19 NOTE — CONFIDENTIAL NOTE
Waiting for records from pt's recent visit with [facility] prescriber before sending new medications.

## 2025-06-24 ASSESSMENT — ANXIETY QUESTIONNAIRES
GAD7 TOTAL SCORE: 7
3. WORRYING TOO MUCH ABOUT DIFFERENT THINGS: NEARLY EVERY DAY
2. NOT BEING ABLE TO STOP OR CONTROL WORRYING: SEVERAL DAYS
6. BECOMING EASILY ANNOYED OR IRRITABLE: NOT AT ALL
7. FEELING AFRAID AS IF SOMETHING AWFUL MIGHT HAPPEN: NOT AT ALL
4. TROUBLE RELAXING: NOT AT ALL
1. FEELING NERVOUS, ANXIOUS, OR ON EDGE: NEARLY EVERY DAY
IF YOU CHECKED OFF ANY PROBLEMS ON THIS QUESTIONNAIRE, HOW DIFFICULT HAVE THESE PROBLEMS MADE IT FOR YOU TO DO YOUR WORK, TAKE CARE OF THINGS AT HOME, OR GET ALONG WITH OTHER PEOPLE: SOMEWHAT DIFFICULT
GAD7 TOTAL SCORE: 7
IF YOU CHECKED OFF ANY PROBLEMS ON THIS QUESTIONNAIRE, HOW DIFFICULT HAVE THESE PROBLEMS MADE IT FOR YOU TO DO YOUR WORK, TAKE CARE OF THINGS AT HOME, OR GET ALONG WITH OTHER PEOPLE: SOMEWHAT DIFFICULT
8. IF YOU CHECKED OFF ANY PROBLEMS, HOW DIFFICULT HAVE THESE MADE IT FOR YOU TO DO YOUR WORK, TAKE CARE OF THINGS AT HOME, OR GET ALONG WITH OTHER PEOPLE?: SOMEWHAT DIFFICULT
8. IF YOU CHECKED OFF ANY PROBLEMS, HOW DIFFICULT HAVE THESE MADE IT FOR YOU TO DO YOUR WORK, TAKE CARE OF THINGS AT HOME, OR GET ALONG WITH OTHER PEOPLE?: SOMEWHAT DIFFICULT
1. FEELING NERVOUS, ANXIOUS, OR ON EDGE: NEARLY EVERY DAY
7. FEELING AFRAID AS IF SOMETHING AWFUL MIGHT HAPPEN: NOT AT ALL
5. BEING SO RESTLESS THAT IT IS HARD TO SIT STILL: NOT AT ALL
6. BECOMING EASILY ANNOYED OR IRRITABLE: NOT AT ALL
4. TROUBLE RELAXING: NOT AT ALL
3. WORRYING TOO MUCH ABOUT DIFFERENT THINGS: NEARLY EVERY DAY
5. BEING SO RESTLESS THAT IT IS HARD TO SIT STILL: NOT AT ALL
2. NOT BEING ABLE TO STOP OR CONTROL WORRYING: SEVERAL DAYS

## 2025-06-25 ASSESSMENT — PATIENT HEALTH QUESTIONNAIRE - PHQ9: SUM OF ALL RESPONSES TO PHQ QUESTIONS 1-9: 7

## 2025-06-25 NOTE — PROGRESS NOTES
"    [facility] [place]    Behavioral Health Clinician Progress Note   Mental Health & Addiction Services      6/26/25    Patient Name: Jolynn Haji       Service Type:  Individual   Service Location:  MyChart / Email (patient reached)   Visit Start Time: 1002am  Visit End Time:  1026am   Session Length: 16 - 37    Attendees: Client   SERVICE MODALITY:  Phone Visit:      Provider verified identity through the following two step process.  Patient provided:  Patient is known previously to provider    Telephone Visit: The patient's condition can be safely assessed and treated via synchronous audio telemedicine encounter.      Reason for Audio Telemedicine Visit: Services only offered telehealth    Originating Site (Patient Location): Patient's home    Distant Site (Provider Location): Provider Remote Setting- Home Office    Telephone visit completed due to the patient did not have access to video, while the distant provider did.    Consent:  The patient/guardian has verbally consented to:     1. The potential risks and benefits of telemedicine (telephone visit) versus in person care;    The patient has been notified of the following:      \"We have found that certain health care needs can be provided without the need for a face to face visit.  This service lets us provide the care you need with a phone conversation.       I will have full access to your [facility] medical record during this entire phone call.  I will be taking notes for your medical record.      Since this is like an office visit, we will bill your insurance company for this service.       There are potential benefits and risks of telephone visits (e.g. limits to patient confidentiality) that differ from in-person visits.?Confidentiality still applies for telephone services, and nobody will record the visit.  It is important to be in a quiet, private space that is free of distractions (including cell " "phone or other devices) during the visit.??      If during the course of the call I believe a telephone visit is not appropriate, you will not be charged for this service\"     Consent has been obtained for this service by care team member: Yes      As the provider I attest to compliance with applicable laws and regulations related to telemedicine.   Visit number: 11    [facility] Visit Activities (Refresh list every visit): [facility]     [place] Diagnostic Assessment: 2/28/24 Mica George MA [facility]  Treatment Plan Review Date: 4/11/25      DATA:    Extended Session (60+ minutes): No   Interactive Complexity: No   Crisis: No   [facility] Patient: No     Assessments completed prior to visit:   PHQ9:       4/6/2025     1:01 PM 4/10/2025     8:43 AM 4/13/2025     5:39 PM 5/27/2025     5:02 PM 6/3/2025     9:54 AM 6/8/2025     4:56 PM 6/25/2025     8:23 AM   PHQ-9 SCORE   PHQ-9 Total Score MyChart 9 (Mild depression) 12 (Moderate depression) 4 (Minimal depression) 4 (Minimal depression) 5 (Mild depression) 5 (Mild depression) 7 (Mild depression)   PHQ-9 Total Score 9  12  4  4  5  5  7        Patient-reported     GAD7:       2/18/2025     3:52 PM 2/28/2025     8:30 PM 4/6/2025     1:06 PM 5/8/2025     4:40 PM 6/1/2025     3:44 PM 6/3/2025     9:57 AM 6/24/2025     9:15 PM   TIFFANY-7 SCORE   Total Score 7 (mild anxiety) 7 (mild anxiety) 6 (mild anxiety) 3 (minimal anxiety) 2 (minimal anxiety) 4 (minimal anxiety) 7 (mild anxiety)   Total Score 7  7  6  3  2  4  7        Patient-reported       Reason for Visit/Presenting Concern:  Anxiety    Current Stressors / Issues:  MH update:  Went to [facility] for second opinion.  Wants to try the wellbutrin.    Stresses:  met with the house relator, felt overwhelmed, slowly starting to clean house out.  Son proposed (said yes)  Appetite: n/a  Sleep: n/a  Outpatient Provider updates: Mcia Valero [facility]  SI/SIB/HI:  Denies n/a  Side effects/compliance:  Interventions:  [facility] engaged in conversation about " anxiety mgmt and grief.  Discussing donation of objections/holding on to memories not things.  Most important:  Tried 150 zoloft brain fog. Back down to 125 and feeling better    5/13  MH update:  Less groggy.  Anxiety and mood about the same.  Son is getting . He is proposing on Friday.  Stresses:  thinking about moving- mobility/health reasons- going to meet with a relator to discuss options.  Has yet to make it to the cabin.  Appetite: n/a  Sleep:  not taking trazodone, stopped about a month ago.  Not needed.  Sleeping well.  Wakes up to urinate, but getting enough sleep.  Outpatient Provider updates: Mica Valero [facility]  SI/SIB/HI:  Denies n/a  Side effects/compliance:  Interventions:  [facility] engaged in conversation about anxiety mgmt and breaking tasks down  Most important:  afternoon feeling crappy often.  Headache, shallower breathing, ears plugged up, fatigue.  Feeling this way more often or not.  Fine 5am-afternoon, then not great, better after dinner to bedtime. Headaches worse.    4/11   update:  several panic attacks during the day, stress on the brain, fatigue after panic.  Mood is feeling better.  Saw Neuro- no further testing.  Making easter plans  Stresses: leaving the house is causing anxiety  Appetite: n/a  Sleep: Fatigue  Outpatient Provider updates: Mica Valero [facility]  SI/SIB/HI:  Denies current  Side effects/compliance:  Interventions:  [facility] reviewed 00511 and anxiety skills.  Most important:  fatigue/ low energy.  Panic thought out the day    1/2   update:  Balance issues much worse.  Feels like mind is not working word finding.  Withdrawn and apprehensive of social setting.  Frustrated with self.  More isolative.  Not driving for 2 weeks.  Headaches are increased.  More bad days then good days.   Stresses:  Xmas went well, didn't enjoy  Appetite: n/a  Sleep: Fatigued,  anxiety at night, not sleeping as the best    Outpatient Provider updates: 1/15 [facility] Mica Valero  SI/SIB/HI:  passive  ideation, no plan/intent.  No previous attempts.  Future and goal oriented.  Help seeking.    Side effects/compliance:  Interventions:  using calm lorena.   [facility] engaged in discussion thought patterns of anxiety and others viewing her differently with word finding.  Discussed giving self jerry.  Most important:  Changes made by PCP ANAMARIA.  Effexor increased.  Feels worse.    11/20   update:  Was changing Trazodone dose around was dosing half and 1/4 for the last 3 weeks and things feel off.  Doesn't feel like mood is as good but does feel more clear.  On going headache questions with effexor.   Stresses:  daughter is hosting thanksgiving.  Appetite: n/a  Sleep: n/a  Outpatient Provider updates: Erika MONTIEL  SI/SIB/HI: n/a  Side effects/compliance:  Interventions:  [facility] encouraged a headache log to better track information  Most important:  Wonders about genesight testing, wonders if this would be appropriate.  Trazdone dosing 1/4 or 1/2.  Wonders about effexor and headaches.     9/26   update:  having some headaches.  Went off trazodone.  Sleeping well without it.  Not as foggy.    Been off about a week.  Company at cookdinner.  Social [employer] events.  Grandkids back in school.  Granddaughter tough time in school/swimming.  Interested in doing more volunteer work  Stresses:  n/a  Appetite: n/a    Sleep: Doing well.  Outpatient Provider updates: Erika MONTIEL  SI/SIB/HI: n/a  Side effects/compliance:  Interventions:  [facility] engaged in support around anxiety mgmt  Most important:  Doing well     8/26 [facility] only  VBS was successful   Attended family wedding  Friends coming to town  Discussed anxiety mgmt during transitions  Discussed path of anxiety tx     7/31 [facility] only  Staying busy birthday parties  VBS next week  Being highly social  Son might have tick bite sx- high anxiety, calling him numerous times.  Discussed catatrophization/behaviors  Discussed people pleasing     6/29  MH update:  Going really well.  Have on going  headaches.  I feels manageable however.  Way less brain fog.    Stresses:  Had to put in new furnaces and air conditioners.   Appetite: N/a   Sleep:  N/a  Outpatient Provider updates: Erika RAIN-Joel leave temporarily Nathalie PAULETTE MONTIEL [facility]  SI/SIB/HI: N/a  Side effects/compliance: N/a  Interventions:  [facility] discussed anxiety mgmt and TIPP skills/decision paralysis.   Most important:  Doing well!  Warning signs: not energy, not being social, not having odessa     2/28   update:  Reduced effexor since last visit.  On going well.  Brain fog gone.  Stresses:  Headache pain is reduced.  Appetite: Denies  Sleep: Trazadone works, no problem  Outpatient Provider updates: [facility] Erika OCHOA  Uses CALM lorena daily with afternoon anxiety  SI/SIB/HI:  Denies past attempts.  Previous passive ideation  FARIDA:  Denies  Side effects/compliance:  N/a  Interventions:  [facility] engaged in completing DA with psychoeducation with treatment planning  Most important: Med changes are great.     Does homework and books to read.    Uses CALM lorena  5/4/3/2/1  May I skills  Journaling  Putting thoughts in box, for another day  Word lorena  Making choices can be difficult sets limits    Therapeutic Interventions:  Acceptance and Commitment Therapy (ACT): Worked with patient to identify values and the ways in which their behaviors are inconsistent with those values. Worked with patient to identify behaviors they could engage in to live in a manner that is more consistent with their values.    Response to treatment interventions:   Patient was receptive to interventions utilized.  Patient was engaged in the therapy process.       Progress on Treatment Objective(s) / Homework:   Satisfactory progress - PREPARATION (Decided to change - considering how); Intervened by negotiating a change plan and determining options / strategies for behavior change, identifying triggers, exploring social supports, and working towards setting a date to begin behavior change     Medication  Review:   Changes to psychiatric medications, see updated Medication List in EPIC.      Medication Compliance:   Yes     Chemical Use Review:  Substance Use: Chemical use reviewed, no active concerns identified      Tobacco Use: No current tobacco use.       Assessment: Current Emotional / Mental Status (status of significant symptoms):    Risk status (Self / Other harm or suicidal ideation)   Patient denies a history of suicidal ideation, suicide attempts, self-injurious behavior, homicidal ideation, homicidal behavior, and and other safety concerns   Patient denies current fears or concerns for personal safety.   Patient denies current or recent suicidal ideation or behaviors.   Patient denies current or recent homicidal ideation or behaviors.   Patient denies current or recent self injurious behavior or ideation.   Patient denies other safety concerns.   Recommended that patient call 911 or go to the local ED should there be a change in any of these risk factors      ASSESSMENT:   Mental Status:     Appearance:   Appropriate    Eye Contact:   Good    Psychomotor Behavior: Normal    Attitude:   Cooperative    Orientation:   All   Speech Rate / Production: Normal    Volume:   Normal    Mood:    Normal   Affect:    Appropriate    Thought Content:  Clear    Thought Form:  Coherent  Logical    Insight:    Good          Diagnoses:      Generalized anxiety disorder  Mood disorder    Collateral Reports Completed:   Communicated with: Dr chatman       Plan: (Homework, other):   Patient was provided No indications of CD issues  Patient was given information about behavioral services and encouraged to schedule a follow up appointment with the clinic [facility] as needed.         NARESH Mena, [facility]     _____________________________________________________________________________________________________________________________________                                              Individual Treatment Plan    Patient's Name: Jolynn LAURO  Adithya   YOB: 1955  Date of Creation: 11/20/24  Date Treatment Plan Last Reviewed/Revised: 4/11/25    DSM5 Diagnoses: 300.02 (F41.1) Generalized Anxiety Disorder  Psychosocial / Contextual Factors: Interpersonal Concerns  PROMIS (reviewed every 90 days):   The following assessments were completed by patient for this visit:  PROMIS 10-Global Health (only subscores and total score):       6/9/2024     8:48 PM 6/23/2024     8:01 PM 9/23/2024     9:33 AM 10/8/2024     4:11 PM 12/28/2024     3:39 PM 4/2/2025     1:09 PM 4/6/2025     1:17 PM   PROMIS-10 Scores Only   Global Mental Health Score 17 17    17 17    17    17 18 12  11  13    Global Physical Health Score 19 19    19 18    18    18 17 12  15  14    PROMIS TOTAL - SUBSCORES 36 36    36 35    35    35 35 24  26  27        Patient-reported        Referral / Collaboration:  Referral to another professional/service is not indicated at this time..    Anticipated number of session for this episode of care:  6-9 sessions  Anticipation frequency of session: Monthly  Anticipated Duration of each session: 16-37 minutes  Treatment plan will be reviewed in 90 days or when goals have been changed.       MeasurableTreatment Goal(s) related to diagnosis / functional impairment(s)  Goal 1: Patient will learn and implement coping skills that result in a reduction of anxiety and nervousness.    I will know I've met my goal when I have reduced my anxiety.      Status: Continued - Date(s):4/11/25     Intervention(s)  [facility] will Motivational Interviewing (MI): Validate patient's thoughts, feelings and experience. Express respect for patient's autonomy in decision making.  Ask open-ended questions to invite patient's self-reflection and self-direction around change and what is important for them in working towards their goals.      Patient has reviewed and agreed to the above plan.     Written by  Mica George LPCC, C

## 2025-06-26 ENCOUNTER — VIRTUAL VISIT (OUTPATIENT)
Dept: PSYCHIATRY | Facility: CLINIC | Age: 70
End: 2025-06-26
Payer: COMMERCIAL

## 2025-06-26 ENCOUNTER — VIRTUAL VISIT (OUTPATIENT)
Dept: BEHAVIORAL HEALTH | Facility: CLINIC | Age: 70
End: 2025-06-26
Payer: COMMERCIAL

## 2025-06-26 VITALS — BODY MASS INDEX: 18.64 KG/M2 | HEIGHT: 68 IN | WEIGHT: 123 LBS

## 2025-06-26 DIAGNOSIS — F39 MOOD DISORDER: ICD-10-CM

## 2025-06-26 DIAGNOSIS — F41.1 GENERALIZED ANXIETY DISORDER: Primary | ICD-10-CM

## 2025-06-26 DIAGNOSIS — F41.1 GAD (GENERALIZED ANXIETY DISORDER): ICD-10-CM

## 2025-06-26 RX ORDER — BUPROPION HYDROCHLORIDE 100 MG/1
100 TABLET, EXTENDED RELEASE ORAL EVERY MORNING
Qty: 90 TABLET | Refills: 0 | Status: SHIPPED | OUTPATIENT
Start: 2025-06-26

## 2025-06-26 RX ORDER — TRAZODONE HYDROCHLORIDE 50 MG/1
12.5-5 TABLET ORAL
COMMUNITY

## 2025-06-26 ASSESSMENT — PAIN SCALES - GENERAL: PAINLEVEL_OUTOF10: NO PAIN (0)

## 2025-06-26 ASSESSMENT — ANXIETY QUESTIONNAIRES
GAD7 TOTAL SCORE: 7
GAD7 TOTAL SCORE: 7

## 2025-06-26 ASSESSMENT — PATIENT HEALTH QUESTIONNAIRE - PHQ9
SUM OF ALL RESPONSES TO PHQ QUESTIONS 1-9: 7
10. IF YOU CHECKED OFF ANY PROBLEMS, HOW DIFFICULT HAVE THESE PROBLEMS MADE IT FOR YOU TO DO YOUR WORK, TAKE CARE OF THINGS AT HOME, OR GET ALONG WITH OTHER PEOPLE: SOMEWHAT DIFFICULT

## 2025-06-26 NOTE — PROGRESS NOTES
"Telemedicine Visit: The patient's condition can be safely assessed and treated via synchronous audio and visual telemedicine encounter.      Reason for Telemedicine Visit: Patient has requested telehealth visit    Originating Site (Patient Location): Patient's home    Distant Location (provider location): Off-Site    Consent:  The patient/guardian has verbally consented to: the potential risks and benefits of telemedicine (video visit) versus in person care; bill my insurance or make self-payment for services provided; and responsibility for payment of non-covered services.     Mode of Communication:  Video Conference via [employer]    As the provider I attest to compliance with applicable laws and regulations related to telemedicine.        Outpatient Psychiatric Progress Note    Name: Jolynn Haji   : 1955                    Primary Care Provider: Helga Ibarra MD   Therapist: DINESH Campbell    PHQ-9 scores:      6/3/2025     9:54 AM 2025     4:56 PM 2025     8:23 AM   PHQ-9 SCORE   PHQ-9 Total Score MyChart 5 (Mild depression) 5 (Mild depression) 7 (Mild depression)   PHQ-9 Total Score 5  5  7        Patient-reported       TIFFANY-7 scores:      2025     3:44 PM 6/3/2025     9:57 AM 2025     9:15 PM   TIFFANY-7 SCORE   Total Score 2 (minimal anxiety) 4 (minimal anxiety) 7 (mild anxiety)   Total Score 2  4  7        Patient-reported       Patient Identification:  Patient is a 69 year old,   White Choose not to answer female  who presents for return visit with me.  Patient is currently retired. Patient attended the phone/video session with  Zach. Patient prefers to be called: \" Azalea\".    Interim History:  I last saw Jolynn Haji for outpatient psychiatry return visit on 2025 . During that appointment, we:  Continue trazodone 12.5-50 mg at bedtime as needed for sleep.    Continue propranolol EXTENDED RELEASE 60 mg ONCE daily for anxiety, tremor, " "headaches.  Increase sertraline/Zoloft to 150 mg daily for anxiety and mood.   Follow-up as needed with your neurologist, Dr. Amato. Can call [phone] to get scheduled or with neurological concerns.   Continue all other cares per primary care provider.     6/26: Overall still feeling quite anxious.  Often feeling overwhelmed.  Some increased psychosocial stressors around the home.  Will be going up to the cabin for this next week and 4 July holiday.  Unfortunately sertraline 150 mg daily caused notable brain fog.  Patient reduced back to 125 mg and feels the brain fog caused by dose increase has resolved.  Received second opinion in [facility] for psychiatry.  Psychiatrist recommended adding Wellbutrin to augment sertraline.  Patient and  will send records to the [facility].  Fax number given today.  No acute safety concerns.  No acute suicidality.  No problematic drug or alcohol use.  See [facility] note below for additional details.    Per [facility], Mica [facility], during today's team-based visit:  MH update:  Went to [facility] for second opinion.  Wants to try the wellbutrin.    Stresses:  met with the house relator, felt overwhelmed, slowly starting to clean house out.  Son proposed (said yes)  Appetite: n/a  Sleep: n/a  Outpatient Provider updates: Mica Valero [facility]  SI/SIB/HI:  Denies n/a  Side effects/compliance:  Interventions:  [facility] engaged in conversation about anxiety mgmt and grief.  Discussing donation of objections/holding on to memories not things.  Most important:  Tried 150 zoloft brain fog. Back down to 125 and feeling better    Past Psychiatric Med Trials:  Psych Meds at Intake:  lexapro 20 mg daily  abilify 5 mg daily      Past Psych Meds:  Amitriptyline  Quetiapine  Mirtazapine - stopped \"because I was sleeping through the night;\" hallucinations     Psychiatric ROS:  Jolynn Haji reports mood has been: See HPI above  Anxiety has been: See HPI above  Sleep has been: Trazodone as " needed  Seema sxs: None  Psychosis sxs: N/A  ADHD/ADD sxs: N/A  PTSD sxs: N/A  PHQ9 and GAD7 scores were reviewed today if completed.   Medication side effects: See HPI above  Current stressors include: Symptoms and See HPI above  Coping mechanisms and supports include: Family, Hobbies and Friends, therapy    Current medications include:   Current Outpatient Medications   Medication Sig Dispense Refill    clindamycin (CLEOCIN T) 1 % external lotion Apply topically 2 times daily. For acne flares 60 mL 3    lisinopril (ZESTRIL) 2.5 MG tablet Take 1 tablet (2.5 mg) by mouth daily as needed (BP >135/85). 90 tablet 3    propranolol ER (INDERAL LA) 60 MG 24 hr capsule TAKE ONE CAPSULE BY MOUTH ONCE DAILY 90 capsule 3    sertraline (ZOLOFT) 100 MG tablet Take 1 tablet (100 mg) by mouth daily. Take WITH 25 mg tab for total daily dose 125 mg. 90 tablet 1    sertraline (ZOLOFT) 25 MG tablet Take 1 tablet (25 mg) by mouth daily. Take WITH 100 mg tab for total daily dose 125 mg. 90 tablet 1     Current Facility-Administered Medications   Medication Dose Route Frequency Provider Last Rate Last Admin    lidocaine 1 % 4 mL  4 mL Other Once            Past Medical/Surgical History:  Past Medical History:   Diagnosis Date    Abnormal glandular Papanicolaou smear of cervix- ASCUS in 2000 12/5/2003    ASC H- 2000    Basal cell carcinoma of leg, right 05/2016    Dr Rogers    Colon polyps 05/2018    tubular adenoam - due 5 yrs - 2 mm    Concussion without loss of consciousness 4/12/2017    Hidradenitis     left  groin    HSIL (high grade squamous intraepithelial lesion) on Pap smear of cervix 7/5/2000    Normal paps from 2001 to 2016/cc    Hypertension goal BP (blood pressure) < 140/90     OA (osteoarthritis) of knee     moderate    Postmenopausal since 7/2008     Shingles 7/23/2010    left     Squamous cell carcinoma of skin, unspecified     Synovial cyst of popliteal space       has a past medical history of Abnormal glandular  Papanicolaou smear of cervix- ASCUS in 2000 (12/5/2003), Basal cell carcinoma of leg, right (05/2016), Colon polyps (05/2018), Concussion without loss of consciousness (4/12/2017), Hidradenitis, HSIL (high grade squamous intraepithelial lesion) on Pap smear of cervix (7/5/2000), Hypertension goal BP (blood pressure) < 140/90, OA (osteoarthritis) of knee, Postmenopausal (since 7/2008 ), Shingles (7/23/2010), Squamous cell carcinoma of skin, unspecified, and Synovial cyst of popliteal space.    She has no past medical history of Malignant melanoma (H) or Squamous cell carcinoma.    Social History:  Reviewed. No changes to social history except as noted above in HPI.    Vital Signs:   None. This is phone/video visit.     Vitals taken on 4/15/2025:  Blood pressure 122/80  Pulse 75  Weight 125 pounds    Imaging:  EXAM: CT HEAD W/O CONTRAST  LOCATION: [facility]  DATE: 3/6/2025  IMPRESSION:  1.  No acute intracranial abnormality.  2.  Similar dilated ventricular system which is greater than expected for parenchymal volume loss. Suggest correlation with symptoms of normal pressure hydrocephalus.      Narrative & Impression   CT SCAN OF THE HEAD WITHOUT CONTRAST   4/14/2017 2:37 PM      IMPRESSION:  1. No evidence of acute trauma.  2. Mild generalized atrophy of the brain.        TIEN JOHNS MD  Labs:  Most recent laboratory results reviewed and the pertinent results include:   TSH   Date Value Ref Range Status   02/06/2025 1.49 0.30 - 4.20 uIU/mL Final   04/25/2022 1.30 0.40 - 4.00 mU/L Final   01/13/2021 2.07 0.40 - 4.00 mU/L Final     Lab Results   Component Value Date    WBC 6.8 02/06/2025    WBC 6.4 01/13/2021     Lab Results   Component Value Date    RBC 4.56 02/06/2025    RBC 4.54 01/13/2021     Lab Results   Component Value Date    HGB 13.9 02/06/2025    HGB 13.3 01/13/2021     Lab Results   Component Value Date    HCT 41.3 02/06/2025    HCT 41.7 01/13/2021     No components found for:  "\"MCT\"  Lab Results   Component Value Date    MCV 91 02/06/2025    MCV 92 01/13/2021     Lab Results   Component Value Date    MCH 30.5 02/06/2025    MCH 29.3 01/13/2021     Lab Results   Component Value Date    MCHC 33.7 02/06/2025    MCHC 31.9 01/13/2021     Lab Results   Component Value Date    RDW 13.0 02/06/2025    RDW 13.1 01/13/2021     Lab Results   Component Value Date     02/06/2025     01/13/2021         Last Comprehensive Metabolic Panel:  Sodium   Date Value Ref Range Status   01/09/2025 138 135 - 145 mmol/L Final   01/13/2021 140 133 - 144 mmol/L Final     Potassium   Date Value Ref Range Status   01/09/2025 4.4 3.4 - 5.3 mmol/L Final   04/25/2022 4.4 3.4 - 5.3 mmol/L Final   01/13/2021 4.1 3.4 - 5.3 mmol/L Final     Chloride   Date Value Ref Range Status   01/09/2025 101 98 - 107 mmol/L Final   04/25/2022 105 94 - 109 mmol/L Final   01/13/2021 108 94 - 109 mmol/L Final     Carbon Dioxide   Date Value Ref Range Status   01/13/2021 28 20 - 32 mmol/L Final     Carbon Dioxide (CO2)   Date Value Ref Range Status   01/09/2025 26 22 - 29 mmol/L Final   04/25/2022 26 20 - 32 mmol/L Final     Anion Gap   Date Value Ref Range Status   01/09/2025 11 7 - 15 mmol/L Final   04/25/2022 6 3 - 14 mmol/L Final   01/13/2021 4 3 - 14 mmol/L Final     Glucose   Date Value Ref Range Status   01/09/2025 99 70 - 99 mg/dL Final   04/25/2022 104 (H) 70 - 99 mg/dL Final   01/13/2021 91 70 - 99 mg/dL Final     Comment:     Fasting specimen     Urea Nitrogen   Date Value Ref Range Status   01/09/2025 22.3 8.0 - 23.0 mg/dL Final   04/25/2022 23 7 - 30 mg/dL Final   01/13/2021 18 7 - 30 mg/dL Final     Creatinine   Date Value Ref Range Status   01/09/2025 0.72 0.51 - 0.95 mg/dL Final   01/13/2021 0.68 0.52 - 1.04 mg/dL Final     GFR Estimate   Date Value Ref Range Status   01/09/2025 89 >60 mL/min/1.73m2 Final     Comment:     eGFR calculated using 2021 CKD-EPI equation.   01/13/2021 >90 >60 mL/min/[1.73_m2] Final     " Comment:     Non  GFR Calc  Starting 12/18/2018, serum creatinine based estimated GFR (eGFR) will be   calculated using the Chronic Kidney Disease Epidemiology Collaboration   (CKD-EPI) equation.       Calcium   Date Value Ref Range Status   01/09/2025 9.7 8.8 - 10.4 mg/dL Final     Comment:     Reference intervals for this test were updated on 7/16/2024 to reflect our healthy population more accurately. There may be differences in the flagging of prior results with similar values performed with this method. Those prior results can be interpreted in the context of the updated reference intervals.   01/13/2021 8.8 8.5 - 10.1 mg/dL Final     Bilirubin Total   Date Value Ref Range Status   02/06/2025 0.5 <=1.2 mg/dL Final   01/13/2021 0.8 0.2 - 1.3 mg/dL Final     Alkaline Phosphatase   Date Value Ref Range Status   02/06/2025 77 40 - 150 U/L Final   01/13/2021 72 40 - 150 U/L Final     ALT   Date Value Ref Range Status   02/06/2025 21 0 - 50 U/L Final   01/13/2021 17 0 - 50 U/L Final     AST   Date Value Ref Range Status   02/06/2025 22 0 - 45 U/L Final   01/13/2021 13 0 - 45 U/L Final     Review of Systems:  10 systems (general, cardiovascular, respiratory, eyes, ENT, endocrine, GI, , M/S, neurological) were reviewed. Most pertinent finding(s) is/are: Some chronic pains, headaches, fatigue see HPI above. The remaining systems are all unremarkable.    Mental Status Examination (limited as this is by phone/video):  Appearance: Awake, alert, appears stated age, no acute distress, well-groomed   Attitude:  cooperative, pleasant   Motor: No gross abnormalities noted today. Difficult to assess via video.  Not formally tested  Oriented to:  person, place, time, and situation  Attention Span and Concentration: Attention seems intact, concentration struggles noted but not formally tested  Speech: Normal volume, some hesitation noted, word-finding difficulties  Language: word-finding difficulties?  Mood:  anxious  Affect: Mood congruent  Associations:  no loose associations  Thought Process: Overall linear and logical  Thought Content:  no evidence of suicidal ideation or homicidal ideation, no auditory hallucinations present and no visual hallucinations present  Recent and Remote Memory: Seems to be struggling more with some short-term memory -not formally tested; has declined neuropsychological testing  Fund of Knowledge: appropriate  Insight: Fair-good  Judgment:  intact, adequate for safety  Impulse Control:  intact    Suicide Risk Assessment:  Today Jolynn Haji reports no suicidal ideation. Based on all available evidence including the factors cited above, Jolynn Haji does not appear to be at imminent risk for self-harm, does not meet criteria for a 72-hr hold, and therefore remains appropriate for ongoing outpatient level of care.  A thorough assessment of risk factors related to suicide and self-harm have been reviewed and are noted above. The patient convincingly denies suicidality on several occasions. Local community safety resources reviewed for patient to use if needed. There was no deceit detected, and the patient presented in a manner that was believable.     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  R/O NPH, Underlying Parkinson's Disease, or other medical/neurological process with prodromal anxiety symptoms (pt has seen neurology - continuing to monitor)    Medical comorbidities include:   Patient Active Problem List    Diagnosis Date Noted    Essential hypertension with goal blood pressure less than 140/90 - then Orthostatic hypotension- back on lisinopril 2.5mg daily and doing daily 2.25 mile walks with her dog 07/16/2001     Priority: High    Abnormal CT of the head- 3/6/2025 compared with 4/14/2017 -No acute  intracranial abn, Similar dilated ventricular system >'er than expected for parenchymal volume loss ? normal pressure hydrocephalus. 06/03/2025     Priority: Medium     saw [facility] - Dr. Silverio Amato 4/4/2025 = didn't think pt had signs of NPH , but didn't have the images from the CT at time of visit.  Pt will request CD copies of the 3/6/2025 and 4/14/2017 Head CT's to bring to [place] and Dr. Amato's office for 2nd opinion.       Dizziness- unknown etiology 06/03/2025     Priority: Medium    Mild cognitive disorder- ? due to depression and anxiety per [facility] - Dr. Silverio Amato 06/03/2025     Priority: Medium    Ventricular enlargement due to brain atrophy -ventricular enlargement is greater than expected for parenchymal volume loss per CT 3/6/2025 compared with 4/14/2017 06/03/2025     Priority: Medium    Chronic daily headache- since depression started 2+ years ago - offered  referral to see Dr. 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 - lower pretibial area- has follow up with Dermatology 3/4/2025 07/12/2023     Priority: Medium    Drug-induced parkinsonism (H)- Abilify - generic = ARIPiprazole( 5mg) - RESOLVED OFF ABILIFY  01/02/2023     Priority: Medium    Mood disorder 01/02/2023     Priority: Medium    Psychomotor retardation- due to depression from adjustment 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 's  tumor diagnosis - much improved -mostly  resolved as of 5/5/2023 - slight presence noted 3/5/2025 and 6/3/2025 07/07/2022     Priority: Medium    Adjustment insomnia - resolved as of 5/5/2023 - off of ambien with psychiatry's okay  12/23/2021     Priority: Medium    Screening for cervical cancer      Priority: Medium     1999 NIL  2000 ASCUS-H.  >> Colpo: Neg  2001 NIL   2003 NIL pap, Neg HPV  [id number], 2016  NIL pap  7/25/19 NIL pap, Neg HPV    Criteria necessary to stop screening per ASCCP guidelines:  Older than 65 years  Three consecutive negative cytology results or two consecutive negative cotest results within the previous 10 years, with the most recent being performed within the last 5 years.   (Women with hx of MADDIE 2 or 3, or adenocarcinoma in situ should continue screening for full 20 years, even if this goes past age 65).        Hyperlipidemia LDL goal <130 11/30/2018     Priority: Medium    Basal cell carcinoma- left external nare - Dr. Sheldon Rogers      Priority: Medium    Colon polyps 05/01/2018     Priority: Medium    Primary localized osteoarthrosis of left lower leg 02/19/2018     Priority: Medium    Other secondary osteoarthritis of left knee 01/29/2018     Priority: Medium    Chronic constipation 09/14/2017     Priority: Medium    Raynaud's phenomenon without gangrene 09/14/2017     Priority: Medium    Insomnia, unspecified type 07/25/2017     Priority: Medium    TIFFANY (generalized anxiety disorder) 05/23/2017     Priority: Medium    Adjustment disorder with mixed anxiety and depressed mood- mild  at this time 05/11/2017     Priority: Medium    Family history of celiac disease 05/11/2017     Priority: Medium    Basal cell carcinoma of leg, right 05/01/2016     Priority: Medium    Sun-damaged skin 04/21/2016     Priority: Medium    Seasonal allergic rhinitis 04/21/2016     Priority: Medium    Dupuytren's disease of palm - right 4th digit flexor tendon 06/11/2015     Priority: Medium    History of  migraine headaches- assoc. with nausea/vomiting - resolved around menopause - were  premenstrual  01/08/2014     Priority: Medium    Rosacea 05/01/2013     Priority: Medium    Numbness of feet- distal feet R>L  02/21/2011     Priority: Medium    NUMBNESS AND TINGLING OF FOOT - bilateral -mild  08/25/2010     Priority: Medium    Osteopenia 01/02/2009     Priority: Medium    Lipidosis 10/24/2006     Priority: Medium     Problem list name updated by automated process. Provider to review      Localized osteoarthritis of hand 11/01/2005     Priority: Medium     Problem list name updated by automated process. Provider to review      Stress incontinence, female - mild 10/23/2012     Priority: Low    Cough 10/23/2012     Priority: Low    Acute bronchitis- recurrent- ? related to mold in school building- pt has since retired and no further bronchitis 10/23/2012     Priority: Low    Postmenopausal      Priority: Low    DERMATOPHYTOSIS OF NAIL- toenails  03/14/2006     Priority: Low     trichophyton rubrum on culture - 1/06       Synovial cyst of popliteal space      Priority: Low    Hidradenitis 12/05/2003     Priority: Low       Psychosocial & Contextual Factors: see HPI above    Assessment:  From Intake, 5/12/2022:  Jolynn Haji is a 66-year-old female with a history including anxiety, depression, insomnia status post concussion in April 2017.  No major mental health struggles prior to concussion in 2017.  Patient was started on Lexapro, quetiapine, mirtazapine to help with her symptoms.  Patient had been feeling much better on that medication combination and once she was sleeping well again quetiapine and mirtazapine were discontinued.  This past January the patient started to have some worsening symptoms and Abilify was added.  Patient has felt like her current medications of Lexapro and Abilify have been helpful but reports starting to feel really slowed and numb the past several weeks.  She also reported a weight loss of  nearly 20 pounds over the last 6 weeks versus several months (patient was not very sure). I am wondering if she could be experiencing side effects of her increased Lexapro dose.  She is agreeable to decreasing her dose slightly to 15 mg daily.  Could also consider decreasing Abilify in the case it is not her Lexapro.  She also desires to sleep a little bit better than she has been.  Could consider restarting mirtazapine at bedtime for sleep and to also augment her Lexapro.  Could also be beneficial for patient to undergo cognitive screening to check in and see what her baseline is status post concussion.  No acute safety concerns.  No SI.  No problematic drug or alcohol use.    5/24/2022:  Patient with some slight improvements of possible medication related side effects after decreasing Lexapro.  I am wondering if there might be more robust improvements with decreasing Abilify/aripiprazole.  Possible drug-induced Parkinsonism?  She does seem to have delayed/slowed speech, masked facies.  Her gait and other movements unable to be observed but she does report other struggles that may be consistent with such a presentation.  We are going to decrease her Abilify down to 2 mg daily and I will see her back in just a few weeks.  We will likely discontinue her Abilify at her next visit.  I will message her primary care provider to get some collateral regarding previous baseline functioning prior to starting Abilify.  History of TBI could be confounding presentation.  Could consider neurology consult.    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  Instructed to stop Abilify today.  We will monitor for improvement of symptoms.  If symptoms do not improve may need to refer to neurology.  If symptoms start to improve we will consider low-dose methylphenidate augmentation of Lexapro next visit to help with low energy, mood, focus and concentration, cognition, s/p TBI if still clinically indicated.   We would discuss risks and benefits with patient and her daughter at that time.  No other acute safety concerns or SI.  No problematic drug or alcohol use.  If patient starts to struggle with sleep would restart mirtazapine at bedtime.    7/28/2022:  Pt still with sxs consistent with drug-induced Parksinsonism.  Neurology consult will be placed so that patient might get scheduled sooner than later due to the typically long wait.  We will start Ambien in hopes we can get her sleeping much better.  Discussed risks and benefits of its use, particularly in the geriatric population.  Lexapro has not been as helpful as it helped for her anxiety.  We will taper this medication and start Effexor/venlafaxine in hopes that might be more effective for her symptoms.  No acute safety concerns.  No SI.  No problematic drug or alcohol use.    8/11/2022:  Overall with continued improved symptoms.  Hopefully patient will continue to have improvement of her symptoms off of Abilify.  Patient will be seeing neurology soon.  Encouraged to keep appointment.  We will continue taper off of Lexapro and continue on monotherapy with venlafaxine.  We will also continue Ambien at this time for sleep since patient is sleeping much better on the medication with improved symptoms and tolerating well.  No acute safety concerns.  No problematic drug or alcohol use.    9/1/2022:  Ongoing anxiety, still intense at times.  Still not sleeping very well.  Could consider sleep evaluation, but patient quite overwhelmed right now with various referrals and doctors appointments.  Briseyda had been working quite well but not keeping her sleep now.  Still falling asleep relatively well.  We will transition to Ambien CR to see if that is more helpful.  We will continue to consider sleep evaluation to rule out possibility of REM behavior sleep disorder.  We will also transition venlafaxine to bedtime to see if that helps with any possible nausea.  Working with  neurology to monitor for Parkinson's disease.  I do recommend neuropsychological testing due to ongoing possible cognitive difficulties/struggles.  No acute safety concerns.  No SI.  No problematic drug or alcohol use.    10/3/2022:  Patient overall with some ongoing anxiety but slowly improving since it got worse late August.  Recommend continuing to optimize Effexor-XR, especially since patient tolerating well.  No longer having any nausea now that dose is taken at bedtime.  Patient feeling a little foggy and off balance with Ambien CR and so we will switch back to Ambien immediate release.  No falls.  Recommend increasing Effexor-XR first and once well tolerated switching back to Ambien immediate release.  May need to continue to work on alternative options for sleep if does not do well back on Ambien immediate release.  No acute safety concerns.  No SI.  No problematic drug or alcohol use.  Encouraged to continue working with neurology.  Next appointment in November.    10/24/2022:  Patient overall with some improved anxiety on increased dose of Effexor-XR.  Could use additional improvement of anxiety and so we will continue to optimize therapy with Effexor-XR.  We will increase dose to 150 mg daily.  Tolerating well with no notable negative side effects.  Patient transitioned back to Ambien CR and is sleeping better on this compared to Ambien immediate release.  We will continue Ambien CR for sleep.  Discussed possibility of sleep medicine referral but patient declines at this time.  Could consider in the future.  No acute safety concerns.  No SI.  No problematic drug or alcohol use.  Encouraged to continue working with neurology.    12/09/2022:  Patient overall doing fairly well.  Feels like increased dose of venlafaxine has been helpful.  I do wonder if she is having some subtle withdrawal symptoms late in the afternoon with headache and fatigue.  Discussed shifting her dose to the morning.  However, she  reports she will try to drink more water and see if that is helpful before trying to make changes to her medication.  She feels like things are going well enough that she really does not want to change anything.  Encouraged to continue in therapy.  No acute safety concerns.  No SI.  No problematic drug or alcohol use.    1/19/2023:  Patient overall doing well with ongoing improvements.  Patient even recently starting to drive again which is a big deal.  She continues in therapy and finds it helpful.  Discussed possible changes to her dosing to see if headaches might improve, along with fatigue.  Discussed possibly splitting her Effexor-XR dose as 75 mg twice daily versus 37 point 5 in the morning and 112.5 mg at bedtime.  Patient feels like she is in a good place right now and really does not want to make any medication changes if possible.  We will keep things the same for now.  Encouraged to drink lots of water.  No acute safety concerns.  No SI.  No problematic drug or alcohol use.    3/9/2023:  Since last visit we decreased Effexor-XR just slightly.  Unfortunately her anxiety has worsened on decreased dose.  Headaches and fatigue did not improve at all.  Since anxiety worsened we will go back up to 150 mg daily.  Patient prefers this over a trial of further decreased dose.  We will start a trial with propranolol to see if this helps improve anxiety and headaches.  May also help with her tremor.  Discussed watching for feeling too lightheaded or dizzy.  Patient also on low-dose lisinopril.  Last vital signs blood pressure 132/78 and pulse 96 on 2/24/2023.  No acute safety concerns.  No SI.  No problematic drug or alcohol use.    3/23/2023:  Patient overall doing very well.  Propranolol has been quite helpful.  Still having some anxiety and headaches so we will continue to optimize therapy with propranolol some.  We will increase to 20 mg twice daily.  Neuropsychiatric testing reviewed.  Some mild deficits but  nothing indicating a neurocognitive disorder at this time.  This was reassuring to the patient.  Patient will review results with neurologist at upcoming appointment in May.  No acute safety concerns.  No SI.  No problematic drug or alcohol use.    4/27/2023:  Patient overall with relative stability of mood and anxiety symptoms.  We will work to discontinue Ambien to see if we can manage on medication such as trazodone.  I still continue to worry about possibility of prodromal Parkinson's disease.  Patient reported her sister noted patient to be yelling and screaming in her sleep the other night.  This concerns me for possible REM sleep behavior disorder.  Could simply be related to antidepressant therapy-but given recent parkinsonism response to neuroleptic, symptoms should continue to be monitored closely.  Could again consider sleep medicine referral at some point in the future if needed.  Patient also continues to have headaches.  If patient remains stable would like to trial decreased dose of Effexor-XR to see if this helps improve headaches.  No acute safety concerns.  No SI.  No problematic drug or alcohol use.    5/11/2023:  Patient overall doing well.  Was able to replace Ambien with trazodone and still sleeping well.  Still some headaches but greatly improved off Ambien.  Went back to taking propranolol 20 mg twice daily and finds this most effective for her anxiety, tremor, etc.  Discussed we could consider lowering venlafaxine ER in a couple of months if still doing really well and still having some headaches.  Patient has a neurology appointment tomorrow for check-in.  No acute safety concerns.  No SI.  No problematic drug or alcohol use.    7/11/2023:  Patient overall doing well still.  Mood has remained stable.  Anxiety overall manageable and stable as well.  Unfortunately continues to have headaches and feel fatigued.  Discussed I would like to move ahead with a trial of decreased venlafaxine.   Patient agreeable and will monitor mental health symptoms.  No acute safety concerns.  No SI.  No problematic drug or alcohol use.    10/10/2023:  Patient overall doing okay.  Unfortunately headaches and fatigue have persisted even with decreased dose of venlafaxine.  We will increase propranolol and changed to extended release so it is once a day to see if that is a little bit more helpful for anxiety and headaches.  Patient controlled decreased dose of venlafaxine and we may consider changing to something different after the holidays.  We would like to prevent any decompensation during the holiday season.  No acute safety concerns.  No SI.  No problematic drug or alcohol use.  Patient continues in therapy.    1/9/2024:  Patient overall doing relatively well.  Still getting headaches from venlafaxine so we will decrease the dose further to 75 mg daily to see if helpful without worsening mental health symptoms.  No acute safety concerns.  No SI.  No problematic drug or alcohol use.    2/28/2024:  Patient doing relatively well.  Still some anxiety but manageable.  Anxiety does not currently interfere with patient's ability to function or go about daily activities.  Easily able to distract self.  No major mood symptoms at this time.  Side effects from venlafaxine have drastically improved on lower dose.  If mental health symptoms/anxiety were to worsen in the future, would strongly consider transition to sertraline due to negative side effects on higher doses of venlafaxine.  No acute safety concerns.  No SI.  No problematic drug or alcohol use.    6/26/2024:  Patient overall doing very well.  Mood has been stable.  Anxiety manageable.  Does use Tylenol twice a day every day for headaches.  Discussed possibility of some rebound headaches.  Resources sent to patient via [employer].  We will continue to monitor dizziness.  Blood pressures can be a little low at times.  We discussed trazodone can cause dizziness and  orthostatic hypotension (blood pressure drops upon standing).  Patient wonders if it is more related to her lisinopril.  Patient encouraged to document blood pressures and present to primary care provider for ongoing evaluation.  No acute safety concerns.  No SI.  No problematic drug or alcohol use.  No medication changes today.    9/26/2024:  Patient overall doing well.  Relatively stable.  Managing anxiety symptoms well.  Sleeping well without nightly trazodone.  Discussed patient could consider massage therapy or acupuncture to see if it improves headaches.  Patient prefers not to change Effexor at this time.  Discussed patient could also talk with primary care provider about possible neurology referral for chronic headaches.  No acute safety concerns.  No SI.  No problematic drug or alcohol use.  No med changes today.    11/20/2024:  Pt doing ok - still some ongoing anxiety and headaches - possibly from Effexor. Pt will consider transitioning off Effexor-XR at next appt 1/2 (after the holidays). Could start fluoxetine 10 mg daily for 5 days (with effexor-xr 75) then decrease Effexor-XR to 37.5 mg (and increase fluoxetine to 20 mg daily) for 7 days then stop Effexor-xr and either continue of fluoxetine or trial a new medication (duloxetine?). No acute safety concerns. No SI. No problematic drug or alcohol use.      1/2/2025:  Patient unfortunately with some worsening symptoms.  Worsening chronic headaches.  Worsening tremor.  Ongoing balance struggles that are perhaps worse.  Struggling more with memory.  Anxiety greatly increased.  Patient agreeable to transitioning off venlafaxine and onto sertraline/Zoloft.  Patient also encouraged to follow up with neurology.  CT scan of head/brain ordered without contrast due to ongoing/worsening headaches and cognitive concerns and balance issues.  Last CT scan of head completed in 2017.  No acute safety concerns.  No SI.  No problematic drug or alcohol  use.    1/21/2025:  Overall doing quite well with transition to sertraline.  Patient will continue with the cross taper-titration.  It does seem she is possibly having some improvement since starting sertraline already.  Patient and daughter will continue to monitor for side effects.  No acute safety concerns.  No suicidality.  No problematic drug or alcohol use.  Started with new psychotherapist and feels it is a good connection.    2/21/2025:  Patient overall with ongoing anxiety.  We will give sertraline 100 mg daily more time to see if mild side effects resolve and to see if any additional symptoms improve.  Patient will be seen back after her upcoming neurology appointment that is scheduled for early April. Pt's CT scan of brain W/O contrast is scheduled for March.  Patient may consider trying to taper off trazodone to see if that helps improve her afternoons.  We talked about distractions and continuing to try to stay busy in the afternoon.  Historically this has been helpful.  No acute safety concerns.  No SI.  No problematic drug or alcohol use.  Continues in psychotherapy.    4/11/2025:  Patient did complete head CT scan without contrast after last follow-up appointment.  Possible ventricular enlargement noted by radiology report.  Patient saw neurology about a week ago.  No major concerns for normal pressure hydrocephalus from neurologist at this time.  Neurologist still trying to get images of CT for review but did read the radiologist report.  Neurologist did not see signs on physical exam for PD or NPH.  Feels more recent cognitive concerns likely related to anxiety and depression symptoms.  Patient declining neuropsychological testing at this time.  Discussed consideration for IOP 55+ program.  Patient prefers to wait at this time before enrolling in IOP.  Patient is agreeable to increasing sertraline to 125 mg daily.  Will consider continued titrations as indicated and as tolerated.  Encouraged to  continue in individual psychotherapy.  No acute safety concerns.  No SI.  No problematic drug or alcohol use.    5/13/2025:  Patient may be with very slight improvements in anxiety related physical symptoms after increasing sertraline dose to 125 mg daily.  Some loose stools but they have improved quite a bit.  Patient will continue trazodone as needed but has not really desire to use it much lately since sleeping okay.  Headaches and fatigue continue.  Vital signs during a clinic visit today within normal limits.  Patient has lost about 15 pounds over the past few months. Was 149 lbs at visit 2/5/2024; 149 lbs at visit 4/25/24; 149 at 8/14/24; 140 lbs at visit 12/19/24; 135 lbs at a visit 2/6/25 and 4/15/2025 was 125 lbs. Was stable at about 150 lbs up until fall 2024 and has since lost 25 pounds in about 7-8 months without having a goal of losing weight.  We will recheck sodium level since SSRI medication can reduce sodium-however, patient with chronic headaches before starting sertraline and with normal sodium in January.  No change in symptoms off trazodone for the past several weeks.  I do continue to have concerns about normal pressure hydrocephalus.  Patient's current neurologist denied concerns at recent visit.  Patient will continue to consider recommendation for repeat neuropsychological testing.  I do have ongoing concerns for cognitive changes.  Have previously discussed recommendation for 55+ IOP.  Patient and family considering trying to get second opinion at [place].  Patient does follow-up with primary care provider in a few weeks.  I will update primary care provider with ongoing concerns from today's visit.  No acute safety concerns.  No acute suicidality.  No problematic drug or alcohol use.  Continues in psychotherapy.  If neurologically there continue to be no concerns regarding any underlying neurological processes, could consider low-dose olanzapine to trial at bedtime for sleep and to augment  sertraline for severe anxiety and to help increase appetite/reduce weight loss.  Would need to monitor very closely for any parkinsonism symptoms.    6/26/2025:  Patient overall continuing to struggle with some anxiety symptoms.  Possible mood symptoms as well.  Had second opinion from psychiatrist in the [place] system.  Still waiting on records.  Patient and  given direct fax number to the [facility] health and addiction clinic.  Psychiatrist [facility] recommended trial with Wellbutrin.  We will start with once daily sustained release formulation of Wellbutrin at 100 mg in the morning.  Discussed watching for worsening anxiety, irritability, insomnia.  Also recommend watching for worsening headaches and any dizziness/brain fog.  Sertraline will be continued at 125 mg daily since 150 mg daily caused notable brain fog.  Brain fog resolved when patient reduced back to 125 mg daily.  No acute safety concerns.  No acute suicidality.  No problematic drug or alcohol use.    Medication side effects and alternatives were reviewed. Health promotion activities recommended and reviewed today. All questions addressed. Education and counseling completed regarding risks and benefits of medications and psychotherapy options. Recommend therapy for additional support.     Treatment Plan:   Continue trazodone 12.5-50 mg at bedtime as needed for sleep.    Continue propranolol EXTENDED RELEASE 60 mg ONCE daily for anxiety, tremor, headaches.  Continue sertraline/Zoloft 125 mg daily for anxiety and mood.   Start Wellbutrin  mg daily in the morning for mood.  Watch for worsening anxiety and insomnia.  Follow-up as needed with your neurologist, Dr. Amato. Can call [phone] to get scheduled or with neurological concerns.   [facility] fax number: [phone].  Continue all other cares per primary care provider.   Continue all other medications as reviewed per electronic medical record today.   Safety  plan reviewed. To the Emergency Department as needed or call after hours crisis line at [phone] or [phone]. [place] Crisis Text Line. Text [place] to 639643 or Suicide LifeLine Chat: suicidepreventionlifeline.org/chat  Continue individual psychotherapy for additional support and ongoing development of nonpharmacologic coping skills and strategies.  Schedule an appointment with me in 4-6 weeks or sooner as needed. Call [facility] at [phone] to schedule.  Follow up with primary care provider as planned or for acute medical concerns.  Call the psychiatric nurse line with medication questions or concerns at [phone].  Madefirehart may be used to communicate with your provider, but this is not intended to be used for emergencies.    Administrative Billing:   Phone Call/Video Duration: 26 Minutes  Start: 10:31a  Stop: 10:57a    The longitudinal plan of care for the diagnosis(es)/condition(s) as documented were addressed during this visit. Due to the added complexity in care, I will continue to support [place] in the subsequent management and with ongoing continuity of care.    Patient Status:  Continuous care patient.     Signed:   Judy Pompa DO  [facility]    Disclaimer: This note consists of symbols derived from keyboarding, dictation and/or voice recognition software. As a result, there may be errors in the script that have gone undetected. Please consider this when interpreting information found in this chart.

## 2025-06-26 NOTE — CONFIDENTIAL NOTE
Pt seen today and concerns addressed in visit. [facility] fax number given for [place] records: [phone].     Judy Pompa DO on 6/26/2025 at 12:15 PM

## 2025-06-26 NOTE — PROGRESS NOTES
"Virtual Visit Details    Type of service:  Video Visit   Video Start Time: {video visit start/end time for provider to select:081315}  Video End Time:{video visit start/end time for provider to select:254378}    Originating Location (pt. Location): {video visit patient location:531042::\"Home\"}  {PROVIDER LOCATION On-site should be selected for visits conducted from your clinic location or adjoining [facility] hospital, academic office, or other nearby [facility] building. Off-site should be selected for all other provider locations, including home:523503}  Distant Location (provider location):  {virtual location provider:787393}  Platform used for Video Visit: {Virtual Visit Platforms:803390::\"eeden\"}  "

## 2025-06-26 NOTE — PATIENT INSTRUCTIONS
Treatment Plan:   Continue trazodone 12.5-50 mg at bedtime as needed for sleep.    Continue propranolol EXTENDED RELEASE 60 mg ONCE daily for anxiety, tremor, headaches.  Continue sertraline/Zoloft 125 mg daily for anxiety and mood.   Start Wellbutrin  mg daily in the morning for mood.  Watch for worsening anxiety and insomnia.  Follow-up as needed with your neurologist, Dr. Amato. Can call [phone] to get scheduled or with neurological concerns.   [facility] fax number: [phone].  Continue all other cares per primary care provider.   Continue all other medications as reviewed per electronic medical record today.   Safety plan reviewed. To the Emergency Department as needed or call after hours crisis line at [phone] or [phone]. [place] Crisis Text Line. Text [place] to 744610 or Suicide LifeLine Chat: suicidepreventionEmployInsightline.org/chat  Continue individual psychotherapy for additional support and ongoing development of nonpharmacologic coping skills and strategies.  Schedule an appointment with me in 4-6 weeks or sooner as needed. Call [facility] at [phone] to schedule.  Follow up with primary care provider as planned or for acute medical concerns.  Call the psychiatric nurse line with medication questions or concerns at [phone].  MyChart may be used to communicate with your provider, but this is not intended to be used for emergencies.    Patient Education   Collaborative Care Psychiatry Service  What to Expect  Here's what to expect from your Collaborative Care Psychiatry Service (CCPS).   About CCPS  CCPS means 2 people work together to help you get better. You'll meet with a behavioral health clinician and a psychiatric doctor. A behavioral health clinician helps people with mental health problems by talking with them. A psychiatric doctor helps people by giving them medicine.  How it works  At every visit, you'll see the behavioral health  "clinician (C) first. They'll talk with you about how you're doing and teach you how to feel better.   Then you'll see the psychiatric doctor. This doctor can help you deal with troubling thoughts and feelings by giving you medicine. They'll make sure you know the plan for your care.   CCPS usually takes 3 to 6 visits. If you need more visits, we may have you start seeing a different psychiatric doctor for ongoing care.  If you have any questions or concerns, we'll be glad to talk with you.  About visits  Be open  At your visits, please talk openly about your problems. It may feel hard, but it's the best way for us to help you.  Cancelling visits  If you can't come to your visit, please call us right away at [phone]. If you don't cancel at least 24 hours (1 full day) before your visit, that's \"late cancellation.\"  Being late to visits  Being very late is the same as not showing up. You will be a \"no show\" if:  Your appointment starts with a [facility], and you're more than 15 minutes late for a 30-minute (half hour) visit. This will also cancel your appointment with the psychiatric doctor.  Your appointment is with a psychiatric doctor only, and you're more than 15 minutes late for a 30-minute (half hour) visit.  Your appointment is with a psychiatric doctor only, and you're more than 30 minutes late for a 60-minute (full hour) visit.  If you cancel late or don't show up 2 times within 6 months, we may end your care.   Getting help between visits  If you need help between visits, you can call us Monday to Friday from 8 a.m. to 4:30 p.m. at [phone].  Emergency care  Call 911 or go to the nearest emergency department if your life or someone else's life is in danger.  Call 418 anytime to reach the national Suicide and Crisis hotline.  Medicine refills  To refill your medicine, call your pharmacy. You can also call [facility]'s Behavioral Access at [phone], Monday to Friday, 8 a.m. to 4:30 p.m. It " can take 1 to 3 business days to get a refill.   Forms, letters, and tests  You may have papers to fill out, like FMLA, short-term disability, and workability. We can help you with these forms at your visits, but you must have an appointment. You may need more than 1 visit for this, to be in an intensive therapy program, or both.  Before we can give you medicine for ADHD, we may refer you to get tested for it or confirm it another way.  We may not be able to give you an emotional support animal letter.  We don't do mental health checks ordered by the court.   We don't do mental health testing, but we can refer you to get tested.   Thank you for choosing us for your care.  For informational purposes only. Not to replace the advice of your health care provider. Copyright   2022 [facility]. All rights reserved. [employer] [id number] - Rev 11/24.

## 2025-06-26 NOTE — NURSING NOTE
Current patient location: [address]  [place] [address]    Is the patient currently in the state of [place]? YES    Visit mode: TELEPHONE    If the visit is dropped, the patient can be reconnected by: PHONE VISIT: Call cell phone:   Telephone Information:   Mobile [phone]       Will anyone else be joining the visit? NO  (If patient encounters technical issues they should call [phone])    Are changes needed to the allergy or medication list? No    Are refills needed on medications prescribed by this physician? Discuss with provider    Rooming Documentation:  Questionnaire(s) completed    Reason for visit: RECHECK    Brooklyn QUEEN

## 2025-07-10 ENCOUNTER — VIRTUAL VISIT (OUTPATIENT)
Facility: CLINIC | Age: 70
End: 2025-07-10
Payer: COMMERCIAL

## 2025-07-10 DIAGNOSIS — G47.00 INSOMNIA, UNSPECIFIED TYPE: ICD-10-CM

## 2025-07-10 DIAGNOSIS — F41.1 GENERALIZED ANXIETY DISORDER: Primary | ICD-10-CM

## 2025-07-10 PROCEDURE — 90837 PSYTX W PT 60 MINUTES: CPT | Mod: 95

## 2025-07-10 ASSESSMENT — ANXIETY QUESTIONNAIRES
1. FEELING NERVOUS, ANXIOUS, OR ON EDGE: NEARLY EVERY DAY
3. WORRYING TOO MUCH ABOUT DIFFERENT THINGS: SEVERAL DAYS
4. TROUBLE RELAXING: SEVERAL DAYS
GAD7 TOTAL SCORE: 6
6. BECOMING EASILY ANNOYED OR IRRITABLE: NOT AT ALL
GAD7 TOTAL SCORE: 6
8. IF YOU CHECKED OFF ANY PROBLEMS, HOW DIFFICULT HAVE THESE MADE IT FOR YOU TO DO YOUR WORK, TAKE CARE OF THINGS AT HOME, OR GET ALONG WITH OTHER PEOPLE?: SOMEWHAT DIFFICULT
2. NOT BEING ABLE TO STOP OR CONTROL WORRYING: SEVERAL DAYS
7. FEELING AFRAID AS IF SOMETHING AWFUL MIGHT HAPPEN: NOT AT ALL
IF YOU CHECKED OFF ANY PROBLEMS ON THIS QUESTIONNAIRE, HOW DIFFICULT HAVE THESE PROBLEMS MADE IT FOR YOU TO DO YOUR WORK, TAKE CARE OF THINGS AT HOME, OR GET ALONG WITH OTHER PEOPLE: SOMEWHAT DIFFICULT
GAD7 TOTAL SCORE: 6
7. FEELING AFRAID AS IF SOMETHING AWFUL MIGHT HAPPEN: NOT AT ALL
5. BEING SO RESTLESS THAT IT IS HARD TO SIT STILL: NOT AT ALL

## 2025-07-10 ASSESSMENT — PATIENT HEALTH QUESTIONNAIRE - PHQ9
10. IF YOU CHECKED OFF ANY PROBLEMS, HOW DIFFICULT HAVE THESE PROBLEMS MADE IT FOR YOU TO DO YOUR WORK, TAKE CARE OF THINGS AT HOME, OR GET ALONG WITH OTHER PEOPLE: SOMEWHAT DIFFICULT
SUM OF ALL RESPONSES TO PHQ QUESTIONS 1-9: 8
SUM OF ALL RESPONSES TO PHQ QUESTIONS 1-9: 8

## 2025-07-10 NOTE — PROGRESS NOTES
[facility]                                     Progress Note    Patient Name: Jolynn Haji  Date: 7/10/2025         Service Type: Individual      Session Start Time: 1:00 pm  Session End Time: 1:53 pm     Session Length: 53 min    Session #: 11  (previous therapy with Erika RAIN)    Attendees: Client attended alone    Service Modality:  Video Visit:      Provider verified identity through the following two step process.  Patient provided:  Patient address    Telemedicine Visit: The patient's condition can be safely assessed and treated via synchronous audio and visual telemedicine encounter.      Reason for Telemedicine Visit: Patient convenience (e.g. access to timely appointments / distance to available provider)    Originating Site (Patient Location): Patient's home    Distant Site (Provider Location): Provider Remote Setting- Home Office    Consent:  The patient/guardian has verbally consented to: the potential risks and benefits of telemedicine (video visit) versus in person care; bill my insurance or make self-payment for services provided; and responsibility for payment of non-covered services.     Patient would like the video invitation sent by:  My Chart    Mode of Communication:  Video Conference via [facility]    Distant Location (Provider):  Off-site    As the provider I attest to compliance with applicable laws and regulations related to telemedicine.    DATA  Interactive Complexity: No  Crisis: No      Progress Since Last Session (Related to Symptoms / Goals / Homework):   Symptoms: Improving anxiety and energy    Homework: Achieved / completed to satisfaction      Episode of Care Goals: Satisfactory progress - ACTION (Actively working towards change); Intervened by reinforcing change plan / affirming steps taken     Current / Ongoing Stressors and Concerns:  Meds increase/change, difficulty with recent change - stopped Zoloft increase.  Irritability when feeling out of control. MRI/CAT  scan inconclusive; neurologist suggests focus on addressing anxiety/depression. Current functioning, cognitive questions, overwhelm. Tired of feeling anxious     Treatment Objective(s) Addressed in This Session:   Sleep hygiene  Grounding skills, mindfulness  Gratitude practice  Anxiety triggers  Panic psychoed     Intervention:  Interpersonal Therapy: provided active listening and validation of experience with stressors. Utilized rapport building, reinforced grounding skills, sleep hygiene strategies, GLAD gratitude practice. Reviewed checking the facts, applied to upcoming events. Validated getting a second opinion at [place].     Assessments completed prior to visit:  The following assessments were completed by patient for this visit:  PHQ2:       5/13/2025     2:18 PM 5/3/2023     1:18 PM 3/21/2023    11:20 AM 2/23/2023     7:15 PM 1/31/2023     3:21 PM 1/12/2023     4:28 PM 12/15/2022     9:12 PM   PHQ-2 ( 1999 [employer])   Q1: Little interest or pleasure in doing things 1   0 0 0    Q2: Feeling down, depressed or hopeless 1   0 0 0    PHQ-2 Score 2   0 0 0    Q1: Little interest or pleasure in doing things    Not at all Not at all Not at all    Q2: Feeling down, depressed or hopeless    Not at all Not at all Not at all    PHQ-2 Score  Incomplete Incomplete 0 0 0 Incomplete     PHQ9:       4/10/2025     8:43 AM 4/13/2025     5:39 PM 5/27/2025     5:02 PM 6/3/2025     9:54 AM 6/8/2025     4:56 PM 6/25/2025     8:23 AM 7/10/2025    12:44 PM   PHQ-9 SCORE   PHQ-9 Total Score MyChart 12 (Moderate depression) 4 (Minimal depression) 4 (Minimal depression) 5 (Mild depression) 5 (Mild depression) 7 (Mild depression) 8 (Mild depression)   PHQ-9 Total Score 12  4  4  5  5  7  8        Patient-reported     GAD2:       2/18/2025     3:52 PM 3/2/2025    12:35 PM 4/6/2025     1:06 PM 5/8/2025     4:40 PM 6/8/2025     4:57 PM 6/24/2025     9:15 PM 7/10/2025    12:46 PM   TIFFANY-2   Feeling nervous, anxious, or on edge 2 3 3 3 2 3 3    Not being able to stop or control worrying 2 1 1 0 0 0 1   TIFFANY-2 Total Score 4  4  4  3  2  3  4        Patient-reported     GAD7:       2/28/2025     8:30 PM 4/6/2025     1:06 PM 5/8/2025     4:40 PM 6/1/2025     3:44 PM 6/3/2025     9:57 AM 6/24/2025     9:15 PM 7/10/2025    12:46 PM   TIFFANY-7 SCORE   Total Score 7 (mild anxiety) 6 (mild anxiety) 3 (minimal anxiety) 2 (minimal anxiety) 4 (minimal anxiety) 7 (mild anxiety) 6 (mild anxiety)   Total Score 7  6  3  2  4  7  6        Patient-reported     CAGE-AID:       6/6/2017    12:05 PM 5/12/2022     2:26 PM   CAGE-AID Total Score   Total Score  0    0   Total Score MyChart  0 (A total score of 2 or greater is considered clinically significant)       Information is confidential and restricted. Go to Review Flowsheets to unlock data.     PROMIS 10-Global Health (only subscores and total score):       6/23/2024     8:01 PM 9/23/2024     9:33 AM 10/8/2024     4:11 PM 12/28/2024     3:39 PM 4/2/2025     1:09 PM 4/6/2025     1:17 PM 7/7/2025     4:30 PM   PROMIS-10 Scores Only   Global Mental Health Score 17    17 17    17    17 18 12  11  13  13    Global Physical Health Score 19    19 18    18    18 17 12  15  14  15    PROMIS TOTAL - SUBSCORES 36    36 35    35    35 35 24  26  27  28        Patient-reported     [place] Suicide Severity Rating Scale (Lifetime/Recent)      5/12/2022    10:07 PM 9/27/2022     2:16 PM 2/28/2024    10:11 AM   [place] Suicide Severity Rating (Lifetime/Recent)   1. Wish to be Dead (Lifetime) N Y Y   Wish to be Dead Description (Lifetime)  fleeting thought of wishing she were dead    1. Wish to be Dead (Past 1 Month)  N N   2. Non-Specific Active Suicidal Thoughts (Lifetime) N N N   Most Severe Ideation Rating (Lifetime)  1 1   Most Severe Ideation Rating (Past 1 Month)  1 1   Frequency (Lifetime)  1 1   Frequency (Past 1 Month)  1    Duration (Lifetime)  1 1   Duration (Past 1 Month)  1    Controllability (Lifetime)  1    Controllability  (Past 1 Month)  1    Deterrents (Lifetime)  1    Deterrents (Past 1 Month)  1    Reasons for Ideation (Lifetime)  4 5   Reasons for Ideation (Past 1 Month)  4 0   Actual Attempt (Lifetime) N N N   Has subject engaged in non-suicidal self-injurious behavior? (Lifetime) N N N   Interrupted Attempts (Lifetime) N N N   Aborted or Self-Interrupted Attempt (Lifetime) N N N   Preparatory Acts or Behavior (Lifetime) N N N   Calculated C-SSRS Risk Score (Lifetime/Recent) No Risk Indicated No Risk Indicated No Risk Indicated         ASSESSMENT: Current Emotional / Mental Status (status of significant symptoms):   Risk status (Self / Other harm or suicidal ideation)   Patient denies current fears or concerns for personal safety.   Patient denies current or recent suicidal ideation or behaviors. Noted that patient made errors in the safety assessment.    Patient denies current or recent homicidal ideation or behaviors.   Patient denies current or recent self injurious behavior or ideation.   Patient denies other safety concerns.   Patient reports there has been no change in risk factors since their last session.     Patient reports there has been no change in protective factors since their last session.     Recommended that patient call 911 or go to the local ED should there be a change in any of these risk factors     Appearance:   Appropriate    Eye Contact:   Good    Psychomotor Behavior: Normal  Restless    Attitude:   Cooperative    Orientation:   All   Speech    Rate / Production: Impoverished  Normal     Volume:  Normal    Mood:    Anxious  Normal   Affect:    Appropriate    Thought Content:  Clear    Thought Form:  Coherent  Logical  Circumstantial   Insight:    Fair      Medication Review:   Changes to psychiatric medications, see updated Medication List in EPIC.  move off Venlafaxine complete     Medication Compliance:   Yes     Changes in Health Issues:   None reported     Chemical Use Review:   Substance Use:  Chemical use reviewed, no active concerns identified      Tobacco Use: No current tobacco use.      Diagnosis:  1. Generalized anxiety disorder    2. Insomnia, unspecified type      Collateral Reports Completed:   Not Applicable    PLAN: (Patient Tasks / Therapist Tasks / Other)  Continue  mindfulness grounding practices, GLAD gratitude, sleep hygiene, notice anxiety triggers, leaves on a stream, little or big deal. Check the facts, radical acceptance, use mindfulness, body scan, am meditation, panic skills    DINESH Campbell  7/10/25                                                            Individual Treatment Plan    Patient's Name: Jolynn Haji  YOB: 1955    Date of Creation: 3/3/25  Date Treatment Plan Last Reviewed/Revised: 6/12/25    DSM5 Diagnoses: 300.02 (F41.1) Generalized Anxiety Disorder  Psychosocial / Contextual Factors: aging, panic  PROMIS (reviewed every 90 days): see above    Referral / Collaboration:  Referral to another professional/service is not indicated at this time..    Anticipated number of session for this episode of care: tbd  Anticipation frequency of session: Every other week  Anticipated Duration of each session: 38-52 minutes  Treatment plan will be reviewed in 90 days or when goals have been changed.       MeasurableTreatment Goal(s) related to diagnosis / functional impairment(s)  Goal 1: Patient will learn and practice skills to address anxiety    I will know I've met my goal when tbc.      Objective #A (Patient Action)    Patient will :.  Sleep hygiene  Grounding skills, mindfulness  Gratitude practice  Breathing skills  Anxiety triggers  Panic psychoed    Status: Continued - Date(s):    6/9/25    Intervention(s)  Therapist will teach sleep hygiene, grounding skills, mindfulness, gratitude practices and assign homework.      Patient has reviewed and agreed to the above plan.      DINESH Campbell  June 9, 2025                                            ______________________________________________________________________

## 2025-07-23 NOTE — PROGRESS NOTES
[facility] [place]    Behavioral Health Clinician Progress Note   Mental Health & Addiction Services      7/28/25    Patient Name: Jolynn Haji       Service Type:  Individual   Service Location:  MyChart / Email (patient reached)   Visit Start Time: 10am  Visit End Time:  1026am   Session Length: 16 - 37    Attendees: Client   SERVICE MODALITY:  Video Visit:      Provider verified identity through the following two step process.  Patient provided:  Patient is known previously to provider    Telemedicine Visit: The patient's condition can be safely assessed and treated via synchronous audio and visual telemedicine encounter.      Reason for Telemedicine Visit: Services only offered telehealth    Originating Site (Patient Location): Patient's home    Distant Site (Provider Location): [facility] MENTAL HEALTH & ADDICTION [facility]    Consent:  The patient/guardian has verbally consented to: the potential risks and benefits of telemedicine (video visit) versus in person care; bill my insurance or make self-payment for services provided; and responsibility for payment of non-covered services.     Patient would like the video invitation sent by:  My Chart    Mode of [facility]:  [facility]    Distant Location (Provider):  On-site    As the provider I attest to compliance with applicable laws and regulations related to telemedicine.    As the provider I attest to compliance with applicable laws and regulations related to telemedicine.   Visit number: 12    [facility] Visit Activities (Refresh list every visit): [facility] [employer]     [place] Diagnostic Assessment: 2/28/24 Mica George MA [facility]  Treatment Plan Review Date: 4/11/25      DATA:    Extended Session (60+ minutes): No   Interactive Complexity: No   Crisis: No   [facility] Patient: No     Assessments completed prior to visit:   PHQ9:       6/3/2025     9:54 AM 6/8/2025     4:56 PM 6/25/2025     8:23 AM  7/10/2025    12:44 PM 7/28/2025     9:37 AM 7/28/2025     9:48 AM 7/28/2025     9:49 AM   PHQ-9 SCORE   PHQ-9 Total Score MyChart 5 (Mild depression) 5 (Mild depression) 7 (Mild depression) 8 (Mild depression) 1 (Minimal depression)     PHQ-9 Total Score 5  5  7  8  1  1 1       Patient-reported     GAD7:       4/6/2025     1:06 PM 5/8/2025     4:40 PM 6/1/2025     3:44 PM 6/3/2025     9:57 AM 6/24/2025     9:15 PM 7/10/2025    12:46 PM 7/25/2025     4:16 PM   TIFFANY-7 SCORE   Total Score 6 (mild anxiety) 3 (minimal anxiety) 2 (minimal anxiety) 4 (minimal anxiety) 7 (mild anxiety) 6 (mild anxiety) 4 (minimal anxiety)   Total Score 6  3  2  4  7  6  4        Patient-reported       Reason for Visit/Presenting Concern:  Anxiety    Current Stressors / Issues:  MH update: Husbands cousins are all getting together, coming into town.  Cleaning house.  Had a good 4th of July.  Son is planning wedding for November.   Stresses: n/a  Appetite: n/a  Sleep: Better  Outpatient Provider updates: Mica Valero [facility]  SI/SIB/HI:  n/a  FARIDA: n/a  Side effects/compliance:  Interventions:  [facility] engaged in support anxiety mgmt with psychosocial stressors  Most important:  Better.  More good days since Wellbutrin, then in the last 2 years.  Some dizziness, in the afternoon.  No increase of anxiety.  Writing in her book/log med compliance good/vs bad days, tracking. Headaches daily, about the same.     6/26  MH update:  Went to [place] for second opinion.  Wants to try the wellbutrin.    Stresses:  met with the house relator, felt overwhelmed, slowly starting to clean house out.  Son proposed (said yes)  Appetite: n/a  Sleep: n/a  Outpatient Provider updates: Mica Valero [facility]  SI/SIB/HI:  Denies n/a  Side effects/compliance:  Interventions:  C engaged in conversation about anxiety mgmt and grief.  Discussing donation of objections/holding on to memories not things.  Most important:  Tried 150 zoloft brain fog. Back down to 125 and feeling  better    5/13  MH update:  Less groggy.  Anxiety and mood about the same.  Son is getting . He is proposing on Friday.  Stresses:  thinking about moving- mobility/health reasons- going to meet with a relator to discuss options.  Has yet to make it to the cabin.  Appetite: n/a  Sleep:  not taking trazodone, stopped about a month ago.  Not needed.  Sleeping well.  Wakes up to urinate, but getting enough sleep.  Outpatient Provider updates: Mica Valero [facility]  SI/SIB/HI:  Denies n/a  Side effects/compliance:  Interventions:  [facility] engaged in conversation about anxiety mgmt and breaking tasks down  Most important:  afternoon feeling crappy often.  Headache, shallower breathing, ears plugged up, fatigue.  Feeling this way more often or not.  Fine 5am-afternoon, then not great, better after dinner to bedtime. Headaches worse.    4/11  MH update:  several panic attacks during the day, stress on the brain, fatigue after panic.  Mood is feeling better.  Saw Neuro- no further testing.  Making easter plans  Stresses: leaving the house is causing anxiety  Appetite: n/a  Sleep: Fatigue  Outpatient Provider updates: Mica Valero [facility]  SI/SIB/HI:  Denies current  Side effects/compliance:  Interventions:  [facility] reviewed [id number] and anxiety skills.  Most important:  fatigue/ low energy.  Panic thought out the day    1/2  MH update:  Balance issues much worse.  Feels like mind is not working word finding.  Withdrawn and apprehensive of social setting.  Frustrated with self.  More isolative.  Not driving for 2 weeks.  Headaches are increased.  More bad days then good days.   Stresses:  Xmas went well, didn't enjoy  Appetite: n/a  Sleep: Fatigued,  anxiety at night, not sleeping as the best    Outpatient Provider updates: 1/15 [facility] Mica Valero  SI/SIB/HI:  passive ideation, no plan/intent.  No previous attempts.  Future and goal oriented.  Help seeking.    Side effects/compliance:  Interventions:  using calm lorena.   [facility] engaged in  discussion thought patterns of anxiety and others viewing her differently with word finding.  Discussed giving self jerry.  Most important:  Changes made by PCP ANAMARIA.  Effexor increased.  Feels worse.    11/20   update:  Was changing Trazodone dose around was dosing half and 1/4 for the last 3 weeks and things feel off.  Doesn't feel like mood is as good but does feel more clear.  On going headache questions with effexor.   Stresses:  daughter is hosting thanksgiving.  Appetite: n/a  Sleep: n/a  Outpatient Provider updates: Erika MONTIEL  SI/SIB/HI: n/a  Side effects/compliance:  Interventions:  [facility] encouraged a headache log to better track information  Most important:  Wonders about genesight testing, wonders if this would be appropriate.  Trazdone dosing 1/4 or 1/2.  Wonders about effexor and headaches.     9/26   update:  having some headaches.  Went off trazodone.  Sleeping well without it.  Not as foggy.    Been off about a week.  [place]  Social [place] events.  Grandkids back in school.  Granddaughter tough time in school/swimming.  Interested in doing more volunteer work  Stresses:  n/a  Appetite: n/a    Sleep: Doing well.  Outpatient Provider updates: Erika MONTIEL  SI/SIB/HI: n/a  Side effects/compliance:  Interventions:  [facility] engaged in support around anxiety mgmt  Most important:  Doing well     8/26 [facility] only  VBS was successful   Attended family wedding  Friends coming to town  Discussed anxiety mgmt during transitions  Discussed path of anxiety tx     7/31 [facility] only  Staying busy birthday parties  VBS next week  Being highly social  Son might have tick bite sx- high anxiety, calling him numerous times.  Discussed catatrophization/behaviors  Discussed people pleasing     6/29   update:  Going really well.  Have on going headaches.  I feels manageable however.  Way less brain fog.    Stresses:  Had to put in new furnaces and air conditioners.   Appetite: N/a   Sleep:  N/a  Outpatient  Provider updates: Erika Mcmahon leave temporarily Nathalie CALDWELL DINESH [facility]  SI/SIB/HI: N/a  Side effects/compliance: N/a  Interventions:  [facility] discussed anxiety mgmt and TIPP skills/decision paralysis.   Most important:  Doing well!  Warning signs: not energy, not being social, not having odessa     2/28   update:  Reduced effexor since last visit.  On going well.  Brain fog gone.  Stresses:  Headache pain is reduced.  Appetite: Denies  Sleep: Trazadone works, no problem  Outpatient Provider updates: [facility] Erika OCHOA  Uses CALM lorena daily with afternoon anxiety  SI/SIB/HI:  Denies past attempts.  Previous passive ideation  FARIDA:  Denies  Side effects/compliance:  N/a  Interventions:  [facility] engaged in completing DA with psychoeducation with treatment planning  Most important: Med changes are great.     Does homework and books to read.    Uses CALM lorena  5/4/3/2/1  May I skills  Journaling  Putting thoughts in box, for another day  Word lorena  Making choices can be difficult sets limits    Therapeutic Interventions:  Acceptance and Commitment Therapy (ACT): Worked with patient to identify values and the ways in which their behaviors are inconsistent with those values. Worked with patient to identify behaviors they could engage in to live in a manner that is more consistent with their values.    Response to treatment interventions:   Patient was receptive to interventions utilized.  Patient was engaged in the therapy process.       Progress on Treatment Objective(s) / Homework:   Satisfactory progress - PREPARATION (Decided to change - considering how); Intervened by negotiating a change plan and determining options / strategies for behavior change, identifying triggers, exploring social supports, and working towards setting a date to begin behavior change     Medication Review:   Changes to psychiatric medications, see updated Medication List in EPIC.      Medication Compliance:   Yes     Chemical Use Review:  Substance Use: Chemical use  reviewed, no active concerns identified      Tobacco Use: No current tobacco use.       Assessment: Current Emotional / Mental Status (status of significant symptoms):    Risk status (Self / Other harm or suicidal ideation)   Patient denies a history of suicidal ideation, suicide attempts, self-injurious behavior, homicidal ideation, homicidal behavior, and and other safety concerns   Patient denies current fears or concerns for personal safety.   Patient denies current or recent suicidal ideation or behaviors.   Patient denies current or recent homicidal ideation or behaviors.   Patient denies current or recent self injurious behavior or ideation.   Patient denies other safety concerns.   Recommended that patient call 911 or go to the local ED should there be a change in any of these risk factors      ASSESSMENT:   Mental Status:     Appearance:   Appropriate    Eye Contact:   Good    Psychomotor Behavior: Normal    Attitude:   Cooperative    Orientation:   All   Speech Rate / Production: Normal    Volume:   Normal    Mood:    Normal   Affect:    Appropriate    Thought Content:  Clear    Thought Form:  Coherent  Logical    Insight:    Good          Diagnoses:      Generalized anxiety disorder  Mood disorder    Collateral Reports Completed:   Communicated with: Dr chatman       Plan: (Homework, other):   Patient was provided No indications of CD issues  Patient was given information about behavioral services and encouraged to schedule a follow up appointment with the clinic [facility] as needed.         NARESH Mena, [facility]     _____________________________________________________________________________________________________________________________________                                              Individual Treatment Plan    Patient's Name: Jolynn Haji   YOB: 1955  Date of Creation: 11/20/24  Date Treatment Plan Last Reviewed/Revised: 7/28/25    DSM5 Diagnoses: 300.02 (F41.1) Generalized Anxiety  Disorder  Psychosocial / Contextual Factors: Interpersonal Concerns  PROMIS (reviewed every 90 days):   The following assessments were completed by patient for this visit:  PROMIS 10-Global Health (only subscores and total score):       9/23/2024     9:33 AM 10/8/2024     4:11 PM 12/28/2024     3:39 PM 4/2/2025     1:09 PM 4/6/2025     1:17 PM 7/7/2025     4:30 PM 7/26/2025     3:42 PM   PROMIS-10 Scores Only   Global Mental Health Score 17    17    17 18 12  11  13  13  14   Global Physical Health Score 18    18    18 17 12  15  14  15  15   PROMIS TOTAL - SUBSCORES 35    35    35 35 24  26  27  28  29       Patient-reported        Referral / Collaboration:  Referral to another professional/service is not indicated at this time..    Anticipated number of session for this episode of care:  6-9 sessions  Anticipation frequency of session: Monthly  Anticipated Duration of each session: 16-37 minutes  Treatment plan will be reviewed in 90 days or when goals have been changed.       MeasurableTreatment Goal(s) related to diagnosis / functional impairment(s)  Goal 1: Patient will learn and implement coping skills that result in a reduction of anxiety and nervousness.    I will know I've met my goal when I have reduced my anxiety.      Status: Continued - Date(s):4/11/25 , 7/28/25    Intervention(s)  [facility] will Motivational Interviewing (MI): Validate patient's thoughts, feelings and experience. Express respect for patient's autonomy in decision making.  Ask open-ended questions to invite patient's self-reflection and self-direction around change and what is important for them in working towards their goals.      Patient has reviewed and agreed to the above plan.     Written by  Mica George [facility], [facility]

## 2025-07-25 ASSESSMENT — ANXIETY QUESTIONNAIRES
2. NOT BEING ABLE TO STOP OR CONTROL WORRYING: NOT AT ALL
7. FEELING AFRAID AS IF SOMETHING AWFUL MIGHT HAPPEN: NOT AT ALL
1. FEELING NERVOUS, ANXIOUS, OR ON EDGE: NEARLY EVERY DAY
4. TROUBLE RELAXING: NOT AT ALL
3. WORRYING TOO MUCH ABOUT DIFFERENT THINGS: SEVERAL DAYS
6. BECOMING EASILY ANNOYED OR IRRITABLE: NOT AT ALL
2. NOT BEING ABLE TO STOP OR CONTROL WORRYING: NOT AT ALL
8. IF YOU CHECKED OFF ANY PROBLEMS, HOW DIFFICULT HAVE THESE MADE IT FOR YOU TO DO YOUR WORK, TAKE CARE OF THINGS AT HOME, OR GET ALONG WITH OTHER PEOPLE?: SOMEWHAT DIFFICULT
7. FEELING AFRAID AS IF SOMETHING AWFUL MIGHT HAPPEN: NOT AT ALL
5. BEING SO RESTLESS THAT IT IS HARD TO SIT STILL: NOT AT ALL
GAD7 TOTAL SCORE: 4
8. IF YOU CHECKED OFF ANY PROBLEMS, HOW DIFFICULT HAVE THESE MADE IT FOR YOU TO DO YOUR WORK, TAKE CARE OF THINGS AT HOME, OR GET ALONG WITH OTHER PEOPLE?: SOMEWHAT DIFFICULT
6. BECOMING EASILY ANNOYED OR IRRITABLE: NOT AT ALL
4. TROUBLE RELAXING: NOT AT ALL
3. WORRYING TOO MUCH ABOUT DIFFERENT THINGS: SEVERAL DAYS
GAD7 TOTAL SCORE: 4
GAD7 TOTAL SCORE: 4
IF YOU CHECKED OFF ANY PROBLEMS ON THIS QUESTIONNAIRE, HOW DIFFICULT HAVE THESE PROBLEMS MADE IT FOR YOU TO DO YOUR WORK, TAKE CARE OF THINGS AT HOME, OR GET ALONG WITH OTHER PEOPLE: SOMEWHAT DIFFICULT
IF YOU CHECKED OFF ANY PROBLEMS ON THIS QUESTIONNAIRE, HOW DIFFICULT HAVE THESE PROBLEMS MADE IT FOR YOU TO DO YOUR WORK, TAKE CARE OF THINGS AT HOME, OR GET ALONG WITH OTHER PEOPLE: SOMEWHAT DIFFICULT
1. FEELING NERVOUS, ANXIOUS, OR ON EDGE: NEARLY EVERY DAY
5. BEING SO RESTLESS THAT IT IS HARD TO SIT STILL: NOT AT ALL
GAD7 TOTAL SCORE: 4

## 2025-07-28 ENCOUNTER — VIRTUAL VISIT (OUTPATIENT)
Dept: PSYCHIATRY | Facility: CLINIC | Age: 70
End: 2025-07-28
Payer: COMMERCIAL

## 2025-07-28 ENCOUNTER — VIRTUAL VISIT (OUTPATIENT)
Dept: BEHAVIORAL HEALTH | Facility: CLINIC | Age: 70
End: 2025-07-28
Payer: COMMERCIAL

## 2025-07-28 DIAGNOSIS — Z87.820 HISTORY OF TRAUMATIC BRAIN INJURY: ICD-10-CM

## 2025-07-28 DIAGNOSIS — F39 MOOD DISORDER: ICD-10-CM

## 2025-07-28 DIAGNOSIS — F41.1 GENERALIZED ANXIETY DISORDER: Primary | ICD-10-CM

## 2025-07-28 DIAGNOSIS — F41.1 GAD (GENERALIZED ANXIETY DISORDER): Primary | ICD-10-CM

## 2025-07-28 DIAGNOSIS — G47.00 INSOMNIA, UNSPECIFIED TYPE: ICD-10-CM

## 2025-07-28 DIAGNOSIS — F33.41 RECURRENT MAJOR DEPRESSIVE DISORDER, IN PARTIAL REMISSION: ICD-10-CM

## 2025-07-28 PROCEDURE — 90832 PSYTX W PT 30 MINUTES: CPT | Mod: 95 | Performed by: COUNSELOR

## 2025-07-28 PROCEDURE — 1126F AMNT PAIN NOTED NONE PRSNT: CPT | Mod: 95 | Performed by: PSYCHIATRY & NEUROLOGY

## 2025-07-28 PROCEDURE — 98006 SYNCH AUDIO-VIDEO EST MOD 30: CPT | Performed by: PSYCHIATRY & NEUROLOGY

## 2025-07-28 RX ORDER — BUPROPION HYDROCHLORIDE 100 MG/1
100 TABLET, EXTENDED RELEASE ORAL EVERY MORNING
Qty: 90 TABLET | Refills: 1 | Status: SHIPPED | OUTPATIENT
Start: 2025-07-28

## 2025-07-28 ASSESSMENT — PATIENT HEALTH QUESTIONNAIRE - PHQ9
10. IF YOU CHECKED OFF ANY PROBLEMS, HOW DIFFICULT HAVE THESE PROBLEMS MADE IT FOR YOU TO DO YOUR WORK, TAKE CARE OF THINGS AT HOME, OR GET ALONG WITH OTHER PEOPLE: SOMEWHAT DIFFICULT
SUM OF ALL RESPONSES TO PHQ QUESTIONS 1-9: 1

## 2025-07-28 ASSESSMENT — PAIN SCALES - GENERAL: PAINLEVEL_OUTOF10: NO PAIN (0)

## 2025-07-28 NOTE — PROGRESS NOTES
"Telemedicine Visit: The patient's condition can be safely assessed and treated via synchronous audio and visual telemedicine encounter.      Reason for Telemedicine Visit: Patient has requested telehealth visit    Originating Site (Patient Location): Patient's home    Distant Location (provider location): On-Site    Consent:  The patient/guardian has verbally consented to: the potential risks and benefits of telemedicine (video visit) versus in person care; bill my insurance or make self-payment for services provided; and responsibility for payment of non-covered services.     Mode of Communication:  Video Conference via [employer]    As the provider I attest to compliance with applicable laws and regulations related to telemedicine.        Outpatient Psychiatric Progress Note    Name: Jolynn Haji   : 1955                    Primary Care Provider: Helga Ibarra MD   Therapist: DINESH Campbell    PHQ-9 scores:      7/10/2025    12:44 PM 2025     9:37 AM 2025     9:48 AM   PHQ-9 SCORE   PHQ-9 Total Score MyChart 8 (Mild depression) 1 (Minimal depression)    PHQ-9 Total Score 8  1  1       Patient-reported       TIFFANY-7 scores:      2025     9:15 PM 7/10/2025    12:46 PM 2025     4:16 PM   TIFFANY-7 SCORE   Total Score 7 (mild anxiety) 6 (mild anxiety) 4 (minimal anxiety)   Total Score 7  6  4        Patient-reported       Patient Identification:  Patient is a 69 year old,   White Choose not to answer female  who presents for return visit with me.  Patient is currently retired. Patient attended the phone/video session with  Zach. Patient prefers to be called: \" Azalea\".    Interim History:  I last saw Jolynn Haji for outpatient psychiatry return visit on 2025 . During that appointment, we:  Continue trazodone 12.5-50 mg at bedtime as needed for sleep.    Continue propranolol EXTENDED RELEASE 60 mg ONCE daily for anxiety, tremor, headaches.  Continue " "sertraline/Zoloft 125 mg daily for anxiety and mood.   Start Wellbutrin  mg daily in the morning for mood.  Watch for worsening anxiety and insomnia.  Follow-up as needed with your neurologist, Dr. Amato. Can call [phone] to get scheduled or with neurological concerns.   [facility] fax number: [phone].    7/28: Pt feels Wellbutrin  mg daily has been helpful. Mood has lifted, improved energy, better motivation. Has been feeling dizzy on the medication. Has remained about the same since starting the medication. No falls. Daily headaches still but not worsened with the Wellbutrin.  does worry some about pt driving when feeling dizzy. Drinking about 45 oz water daily. Sleeping well. Has not needed to use trazodone. Appetite ok - trying to put a little weight back on. No acute safety concerns. No SI. No problematic drug or alcohol use. See [facility] note below for additional info.     Per [facility], [facility], during today's team-based visit:   update: Husbands cousins are all getting together, coming into town.  Cleaning house.  Had a good 4th of July.  Son is planning wedding for November.   Stresses: n/a  Appetite: n/a  Sleep: Better  Outpatient Provider updates: Mica Valero [facility]  SI/SIB/HI:  n/a  FARIDA: n/a  Side effects/compliance:  Interventions:  [facility] engaged in support anxiety mgmt with psychosocial stressors  Most important:  Better.  More good days since Wellbutrin, then in the last 2 years.  Some dizziness, in the afternoon.  No increase of anxiety.  Writing in her book/log med compliance good/vs bad days, tracking. Headaches daily, about the same.     Past Psychiatric Med Trials:  Psych Meds at Intake:  lexapro 20 mg daily  abilify 5 mg daily      Past Psych Meds:  Amitriptyline  Quetiapine  Mirtazapine - stopped \"because I was sleeping through the night;\" hallucinations     Psychiatric ROS:  Jolynn Haji reports mood has been: See HPI above  Anxiety has been: See " HPI above  Sleep has been: Trazodone as needed  Seema sxs: None  Psychosis sxs: N/A  ADHD/ADD sxs: N/A  PTSD sxs: N/A  PHQ9 and GAD7 scores were reviewed today if completed.   Medication side effects: See HPI above  Current stressors include: Symptoms and See HPI above  Coping mechanisms and supports include: Family, Hobbies and Friends, therapy    Current medications include:   Current Outpatient Medications   Medication Sig Dispense Refill    buPROPion (WELLBUTRIN SR) 100 MG 12 hr tablet Take 1 tablet (100 mg) by mouth every morning. 90 tablet 0    clindamycin (CLEOCIN T) 1 % external lotion Apply topically 2 times daily. For acne flares 60 mL 3    lisinopril (ZESTRIL) 2.5 MG tablet Take 1 tablet (2.5 mg) by mouth daily as needed (BP >135/85). 90 tablet 3    propranolol ER (INDERAL LA) 60 MG 24 hr capsule TAKE ONE CAPSULE BY MOUTH ONCE DAILY 90 capsule 3    sertraline (ZOLOFT) 100 MG tablet Take 1 tablet (100 mg) by mouth daily. Take WITH 25 mg tab for total daily dose 125 mg. 90 tablet 1    sertraline (ZOLOFT) 25 MG tablet Take 1 tablet (25 mg) by mouth daily. Take WITH 100 mg tab for total daily dose 125 mg. 90 tablet 1    traZODone (DESYREL) 50 MG tablet Take 12.5-50 mg by mouth nightly as needed for sleep.       Current Facility-Administered Medications   Medication Dose Route Frequency Provider Last Rate Last Admin    lidocaine 1 % 4 mL  4 mL Other Once            Past Medical/Surgical History:  Past Medical History:   Diagnosis Date    Abnormal glandular Papanicolaou smear of cervix- ASCUS in 2000 12/5/2003    ASC H- 2000    Basal cell carcinoma of leg, right 05/2016    Dr Rogers    Colon polyps 05/2018    tubular adenoam - due 5 yrs - 2 mm    Concussion without loss of consciousness 4/12/2017    Hidradenitis     left  groin    HSIL (high grade squamous intraepithelial lesion) on Pap smear of cervix 7/5/2000    Normal paps from 2001 to 2016/cc    Hypertension goal BP (blood pressure) < 140/90     OA  (osteoarthritis) of knee     moderate    Postmenopausal since 7/2008     Shingles 7/23/2010    left     Squamous cell carcinoma of skin, unspecified     Synovial cyst of popliteal space       has a past medical history of Abnormal glandular Papanicolaou smear of cervix- ASCUS in 2000 (12/5/2003), Basal cell carcinoma of leg, right (05/2016), Colon polyps (05/2018), Concussion without loss of consciousness (4/12/2017), Hidradenitis, HSIL (high grade squamous intraepithelial lesion) on Pap smear of cervix (7/5/2000), Hypertension goal BP (blood pressure) < 140/90, OA (osteoarthritis) of knee, Postmenopausal (since 7/2008 ), Shingles (7/23/2010), Squamous cell carcinoma of skin, unspecified, and Synovial cyst of popliteal space.    She has no past medical history of Malignant melanoma (H) or Squamous cell carcinoma.    Social History:  Reviewed. No changes to social history except as noted above in HPI.    Vital Signs:   None. This is phone/video visit.     Vitals taken on 4/15/2025:  Blood pressure 122/80  Pulse 75  Weight 125 pounds    Imaging:  EXAM: CT HEAD W/O CONTRAST  LOCATION: [facility]  DATE: 3/6/2025  IMPRESSION:  1.  No acute intracranial abnormality.  2.  Similar dilated ventricular system which is greater than expected for parenchymal volume loss. Suggest correlation with symptoms of normal pressure hydrocephalus.      Narrative & Impression   CT SCAN OF THE HEAD WITHOUT CONTRAST   4/14/2017 2:37 PM      IMPRESSION:  1. No evidence of acute trauma.  2. Mild generalized atrophy of the brain.        TIEN JOHNS MD  Labs:  Most recent laboratory results reviewed and the pertinent results include:   TSH   Date Value Ref Range Status   02/06/2025 1.49 0.30 - 4.20 uIU/mL Final   04/25/2022 1.30 0.40 - 4.00 mU/L Final   01/13/2021 2.07 0.40 - 4.00 mU/L Final     Lab Results   Component Value Date    WBC 6.8 02/06/2025    WBC 6.4 01/13/2021     Lab Results   Component Value Date    RBC  "4.56 02/06/2025    RBC 4.54 01/13/2021     Lab Results   Component Value Date    HGB 13.9 02/06/2025    HGB 13.3 01/13/2021     Lab Results   Component Value Date    HCT 41.3 02/06/2025    HCT 41.7 01/13/2021     No components found for: \"MCT\"  Lab Results   Component Value Date    MCV 91 02/06/2025    MCV 92 01/13/2021     Lab Results   Component Value Date    MCH 30.5 02/06/2025    MCH 29.3 01/13/2021     Lab Results   Component Value Date    MCHC 33.7 02/06/2025    MCHC 31.9 01/13/2021     Lab Results   Component Value Date    RDW 13.0 02/06/2025    RDW 13.1 01/13/2021     Lab Results   Component Value Date     02/06/2025     01/13/2021         Last Comprehensive Metabolic Panel:  Sodium   Date Value Ref Range Status   01/09/2025 138 135 - 145 mmol/L Final   01/13/2021 140 133 - 144 mmol/L Final     Potassium   Date Value Ref Range Status   01/09/2025 4.4 3.4 - 5.3 mmol/L Final   04/25/2022 4.4 3.4 - 5.3 mmol/L Final   01/13/2021 4.1 3.4 - 5.3 mmol/L Final     Chloride   Date Value Ref Range Status   01/09/2025 101 98 - 107 mmol/L Final   04/25/2022 105 94 - 109 mmol/L Final   01/13/2021 108 94 - 109 mmol/L Final     Carbon Dioxide   Date Value Ref Range Status   01/13/2021 28 20 - 32 mmol/L Final     Carbon Dioxide (CO2)   Date Value Ref Range Status   01/09/2025 26 22 - 29 mmol/L Final   04/25/2022 26 20 - 32 mmol/L Final     Anion Gap   Date Value Ref Range Status   01/09/2025 11 7 - 15 mmol/L Final   04/25/2022 6 3 - 14 mmol/L Final   01/13/2021 4 3 - 14 mmol/L Final     Glucose   Date Value Ref Range Status   01/09/2025 99 70 - 99 mg/dL Final   04/25/2022 104 (H) 70 - 99 mg/dL Final   01/13/2021 91 70 - 99 mg/dL Final     Comment:     Fasting specimen     Urea Nitrogen   Date Value Ref Range Status   01/09/2025 22.3 8.0 - 23.0 mg/dL Final   04/25/2022 23 7 - 30 mg/dL Final   01/13/2021 18 7 - 30 mg/dL Final     Creatinine   Date Value Ref Range Status   01/09/2025 0.72 0.51 - 0.95 mg/dL Final "   01/13/2021 0.68 0.52 - 1.04 mg/dL Final     GFR Estimate   Date Value Ref Range Status   01/09/2025 89 >60 mL/min/1.73m2 Final     Comment:     eGFR calculated using 2021 CKD-EPI equation.   01/13/2021 >90 >60 mL/min/[1.73_m2] Final     Comment:     Non  GFR Calc  Starting 12/18/2018, serum creatinine based estimated GFR (eGFR) will be   calculated using the Chronic Kidney Disease Epidemiology Collaboration   (CKD-EPI) equation.       Calcium   Date Value Ref Range Status   01/09/2025 9.7 8.8 - 10.4 mg/dL Final     Comment:     Reference intervals for this test were updated on 7/16/2024 to reflect our healthy population more accurately. There may be differences in the flagging of prior results with similar values performed with this method. Those prior results can be interpreted in the context of the updated reference intervals.   01/13/2021 8.8 8.5 - 10.1 mg/dL Final     Bilirubin Total   Date Value Ref Range Status   02/06/2025 0.5 <=1.2 mg/dL Final   01/13/2021 0.8 0.2 - 1.3 mg/dL Final     Alkaline Phosphatase   Date Value Ref Range Status   02/06/2025 77 40 - 150 U/L Final   01/13/2021 72 40 - 150 U/L Final     ALT   Date Value Ref Range Status   02/06/2025 21 0 - 50 U/L Final   01/13/2021 17 0 - 50 U/L Final     AST   Date Value Ref Range Status   02/06/2025 22 0 - 45 U/L Final   01/13/2021 13 0 - 45 U/L Final     Review of Systems:  10 systems (general, cardiovascular, respiratory, eyes, ENT, endocrine, GI, , M/S, neurological) were reviewed. Most pertinent finding(s) is/are: Some chronic pains, headaches, fatigue, see HPI above. The remaining systems are all unremarkable.    Mental Status Examination (limited as this is by phone/video):  Appearance: Awake, alert, appears stated age, no acute distress, well-groomed   Attitude:  cooperative, pleasant   Motor: No gross abnormalities noted today. Difficult to assess via video.  Not formally tested  Oriented to:  person, place, time, and  situation  Attention Span and Concentration: Attention seems intact, concentration struggles noted but not formally tested  Speech: Normal volume, some hesitation noted, word-finding difficulties  Language: word-finding difficulties?  Mood: anxious  Affect: Mood congruent  Associations:  no loose associations  Thought Process: Overall linear and logical  Thought Content:  no evidence of suicidal ideation or homicidal ideation, no auditory hallucinations present and no visual hallucinations present  Recent and Remote Memory: not tested; has declined neuropsychological testing  Fund of Knowledge: appropriate  Insight: Fair-good  Judgment:  intact, adequate for safety  Impulse Control:  intact    Suicide Risk Assessment:  Today Jolynn Haji reports no suicidal ideation. Based on all available evidence including the factors cited above, Jolynn Haji does not appear to be at imminent risk for self-harm, does not meet criteria for a 72-hr hold, and therefore remains appropriate for ongoing outpatient level of care.  A thorough assessment of risk factors related to suicide and self-harm have been reviewed and are noted above. The patient convincingly denies suicidality on several occasions. Local community safety resources reviewed for patient to use if needed. There was no deceit detected, and the patient presented in a manner that was believable.     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  R/O NPH, Underlying Parkinson's Disease, or other medical/neurological process with prodromal anxiety symptoms (pt has seen neurology - continuing to monitor)    Medical comorbidities include:   Patient Active Problem List    Diagnosis Date Noted    Essential hypertension with goal blood pressure less than 140/90 - then Orthostatic  hypotension- back on lisinopril 2.5mg daily and doing daily 2.25 mile walks with her dog 07/16/2001     Priority: High    Abnormal CT of the head- 3/6/2025 compared with 4/14/2017 -No acute intracranial abn, Similar dilated ventricular system >'er than expected for parenchymal volume loss ? normal pressure hydrocephalus. 06/03/2025     Priority: Medium     saw [facility] - Dr. Silverio Amato 4/4/2025 = didn't think pt had signs of NPH , but didn't have the images from the CT at time of visit.  Pt will request CD copies of the 3/6/2025 and 4/14/2017 Head CT's to bring to [place] and Dr. Amato's office for 2nd opinion.       Dizziness- unknown etiology 06/03/2025     Priority: Medium    Mild cognitive disorder- ? due to depression and anxiety per [facility] - Dr. Silverio Amato 06/03/2025     Priority: Medium    Ventricular enlargement due to brain atrophy -ventricular enlargement is greater than expected for parenchymal volume loss per CT 3/6/2025 compared with 4/14/2017 06/03/2025     Priority: Medium    Chronic daily headache- since depression started 2+ years ago - offered  referral to see Dr. 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 - lower pretibial area- has follow up with Dermatology 3/4/2025 07/12/2023     Priority: Medium    Drug-induced parkinsonism (H)- Abilify - generic = ARIPiprazole( 5mg) -  RESOLVED OFF ABILIFY  01/02/2023     Priority: Medium    Mood disorder 01/02/2023     Priority: Medium    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 - much improved -mostly  resolved as of 5/5/2023 - slight presence noted 3/5/2025 and 6/3/2025 07/07/2022     Priority: Medium    Adjustment insomnia - resolved as of 5/5/2023 - off of ambien with psychiatry's okay  12/23/2021     Priority: Medium    Screening for cervical cancer      Priority: Medium     1999 NIL  2000 ASCUS-H.  >> Colpo: Neg  2001 NIL   2003 NIL pap, Neg HPV  [id number], 2016  NIL pap  7/25/19 NIL pap, Neg HPV    Criteria necessary to stop screening per ASCCP guidelines:  Older than 65 years  Three consecutive negative cytology results or two consecutive negative cotest results within the previous 10 years, with the most recent being performed within the last 5 years.   (Women with hx of MADDIE 2 or 3, or adenocarcinoma in situ should continue screening for full 20 years, even if this goes past age 65).        Hyperlipidemia LDL goal <130 11/30/2018     Priority: Medium    Basal cell carcinoma- left external nare - Dr. Sheldon Rogers      Priority: Medium    Colon polyps 05/01/2018     Priority: Medium    Primary localized osteoarthrosis of left lower leg 02/19/2018     Priority: Medium    Other secondary osteoarthritis of left knee 01/29/2018     Priority: Medium    Chronic constipation 09/14/2017     Priority: Medium    Raynaud's phenomenon without gangrene 09/14/2017     Priority: Medium    Insomnia, unspecified type 07/25/2017     Priority: Medium    TIFFANY (generalized anxiety disorder) 05/23/2017     Priority: Medium    Adjustment disorder with mixed anxiety and depressed mood- mild  at this time 05/11/2017     Priority: Medium    Family history of celiac disease 05/11/2017     Priority: Medium    Basal cell carcinoma of leg, right 05/01/2016     Priority: Medium    Sun-damaged skin 04/21/2016      Priority: Medium    Seasonal allergic rhinitis 04/21/2016     Priority: Medium    Dupuytren's disease of palm - right 4th digit flexor tendon 06/11/2015     Priority: Medium    History of migraine headaches- assoc. with nausea/vomiting - resolved around menopause - were  premenstrual  01/08/2014     Priority: Medium    Rosacea 05/01/2013     Priority: Medium    Numbness of feet- distal feet R>L  02/21/2011     Priority: Medium    NUMBNESS AND TINGLING OF FOOT - bilateral -mild  08/25/2010     Priority: Medium    Osteopenia 01/02/2009     Priority: Medium    Lipidosis 10/24/2006     Priority: Medium     Problem list name updated by automated process. Provider to review      Localized osteoarthritis of hand 11/01/2005     Priority: Medium     Problem list name updated by automated process. Provider to review      Stress incontinence, female - mild 10/23/2012     Priority: Low    Cough 10/23/2012     Priority: Low    Acute bronchitis- recurrent- ? related to mold in school building- pt has since retired and no further bronchitis 10/23/2012     Priority: Low    Postmenopausal      Priority: Low    DERMATOPHYTOSIS OF NAIL- toenails  03/14/2006     Priority: Low     trichophyton rubrum on culture - 1/06       Synovial cyst of popliteal space      Priority: Low    Hidradenitis 12/05/2003     Priority: Low       Psychosocial & Contextual Factors: see HPI above    Assessment:  From Intake, 5/12/2022:  Jolynn Haji is a 66-year-old female with a history including anxiety, depression, insomnia status post concussion in April 2017.  No major mental health struggles prior to concussion in 2017.  Patient was started on Lexapro, quetiapine, mirtazapine to help with her symptoms.  Patient had been feeling much better on that medication combination and once she was sleeping well again quetiapine and mirtazapine were discontinued.  This past January the patient started to have some worsening symptoms and Abilify was added.   Patient has felt like her current medications of Lexapro and Abilify have been helpful but reports starting to feel really slowed and numb the past several weeks.  She also reported a weight loss of nearly 20 pounds over the last 6 weeks versus several months (patient was not very sure). I am wondering if she could be experiencing side effects of her increased Lexapro dose.  She is agreeable to decreasing her dose slightly to 15 mg daily.  Could also consider decreasing Abilify in the case it is not her Lexapro.  She also desires to sleep a little bit better than she has been.  Could consider restarting mirtazapine at bedtime for sleep and to also augment her Lexapro.  Could also be beneficial for patient to undergo cognitive screening to check in and see what her baseline is status post concussion.  No acute safety concerns.  No SI.  No problematic drug or alcohol use.    5/24/2022:  Patient with some slight improvements of possible medication related side effects after decreasing Lexapro.  I am wondering if there might be more robust improvements with decreasing Abilify/aripiprazole.  Possible drug-induced Parkinsonism?  She does seem to have delayed/slowed speech, masked facies.  Her gait and other movements unable to be observed but she does report other struggles that may be consistent with such a presentation.  We are going to decrease her Abilify down to 2 mg daily and I will see her back in just a few weeks.  We will likely discontinue her Abilify at her next visit.  I will message her primary care provider to get some collateral regarding previous baseline functioning prior to starting Abilify.  History of TBI could be confounding presentation.  Could consider neurology consult.    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  Instructed to stop Abilify today.  We will monitor for improvement of symptoms.  If symptoms do not improve may need to refer to neurology.  If symptoms  start to improve we will consider low-dose methylphenidate augmentation of Lexapro next visit to help with low energy, mood, focus and concentration, cognition, s/p TBI if still clinically indicated.  We would discuss risks and benefits with patient and her daughter at that time.  No other acute safety concerns or SI.  No problematic drug or alcohol use.  If patient starts to struggle with sleep would restart mirtazapine at bedtime.    7/28/2022:  Pt still with sxs consistent with drug-induced Parksinsonism.  Neurology consult will be placed so that patient might get scheduled sooner than later due to the typically long wait.  We will start Ambien in hopes we can get her sleeping much better.  Discussed risks and benefits of its use, particularly in the geriatric population.  Lexapro has not been as helpful as it helped for her anxiety.  We will taper this medication and start Effexor/venlafaxine in hopes that might be more effective for her symptoms.  No acute safety concerns.  No SI.  No problematic drug or alcohol use.    8/11/2022:  Overall with continued improved symptoms.  Hopefully patient will continue to have improvement of her symptoms off of Abilify.  Patient will be seeing neurology soon.  Encouraged to keep appointment.  We will continue taper off of Lexapro and continue on monotherapy with venlafaxine.  We will also continue Ambien at this time for sleep since patient is sleeping much better on the medication with improved symptoms and tolerating well.  No acute safety concerns.  No problematic drug or alcohol use.    9/1/2022:  Ongoing anxiety, still intense at times.  Still not sleeping very well.  Could consider sleep evaluation, but patient quite overwhelmed right now with various referrals and doctors appointments.  Briseyda had been working quite well but not keeping her sleep now.  Still falling asleep relatively well.  We will transition to Briseyda CR to see if that is more helpful.  We will  continue to consider sleep evaluation to rule out possibility of REM behavior sleep disorder.  We will also transition venlafaxine to bedtime to see if that helps with any possible nausea.  Working with neurology to monitor for Parkinson's disease.  I do recommend neuropsychological testing due to ongoing possible cognitive difficulties/struggles.  No acute safety concerns.  No SI.  No problematic drug or alcohol use.    10/3/2022:  Patient overall with some ongoing anxiety but slowly improving since it got worse late August.  Recommend continuing to optimize Effexor-XR, especially since patient tolerating well.  No longer having any nausea now that dose is taken at bedtime.  Patient feeling a little foggy and off balance with Ambien CR and so we will switch back to Ambien immediate release.  No falls.  Recommend increasing Effexor-XR first and once well tolerated switching back to Ambien immediate release.  May need to continue to work on alternative options for sleep if does not do well back on Ambien immediate release.  No acute safety concerns.  No SI.  No problematic drug or alcohol use.  Encouraged to continue working with neurology.  Next appointment in November.    10/24/2022:  Patient overall with some improved anxiety on increased dose of Effexor-XR.  Could use additional improvement of anxiety and so we will continue to optimize therapy with Effexor-XR.  We will increase dose to 150 mg daily.  Tolerating well with no notable negative side effects.  Patient transitioned back to Ambien CR and is sleeping better on this compared to Ambien immediate release.  We will continue Ambien CR for sleep.  Discussed possibility of sleep medicine referral but patient declines at this time.  Could consider in the future.  No acute safety concerns.  No SI.  No problematic drug or alcohol use.  Encouraged to continue working with neurology.    12/09/2022:  Patient overall doing fairly well.  Feels like increased dose of  venlafaxine has been helpful.  I do wonder if she is having some subtle withdrawal symptoms late in the afternoon with headache and fatigue.  Discussed shifting her dose to the morning.  However, she reports she will try to drink more water and see if that is helpful before trying to make changes to her medication.  She feels like things are going well enough that she really does not want to change anything.  Encouraged to continue in therapy.  No acute safety concerns.  No SI.  No problematic drug or alcohol use.    1/19/2023:  Patient overall doing well with ongoing improvements.  Patient even recently starting to drive again which is a big deal.  She continues in therapy and finds it helpful.  Discussed possible changes to her dosing to see if headaches might improve, along with fatigue.  Discussed possibly splitting her Effexor-XR dose as 75 mg twice daily versus 37 point 5 in the morning and 112.5 mg at bedtime.  Patient feels like she is in a good place right now and really does not want to make any medication changes if possible.  We will keep things the same for now.  Encouraged to drink lots of water.  No acute safety concerns.  No SI.  No problematic drug or alcohol use.    3/9/2023:  Since last visit we decreased Effexor-XR just slightly.  Unfortunately her anxiety has worsened on decreased dose.  Headaches and fatigue did not improve at all.  Since anxiety worsened we will go back up to 150 mg daily.  Patient prefers this over a trial of further decreased dose.  We will start a trial with propranolol to see if this helps improve anxiety and headaches.  May also help with her tremor.  Discussed watching for feeling too lightheaded or dizzy.  Patient also on low-dose lisinopril.  Last vital signs blood pressure 132/78 and pulse 96 on 2/24/2023.  No acute safety concerns.  No SI.  No problematic drug or alcohol use.    3/23/2023:  Patient overall doing very well.  Propranolol has been quite helpful.  Still  having some anxiety and headaches so we will continue to optimize therapy with propranolol some.  We will increase to 20 mg twice daily.  Neuropsychiatric testing reviewed.  Some mild deficits but nothing indicating a neurocognitive disorder at this time.  This was reassuring to the patient.  Patient will review results with neurologist at upcoming appointment in May.  No acute safety concerns.  No SI.  No problematic drug or alcohol use.    4/27/2023:  Patient overall with relative stability of mood and anxiety symptoms.  We will work to discontinue Ambien to see if we can manage on medication such as trazodone.  I still continue to worry about possibility of prodromal Parkinson's disease.  Patient reported her sister noted patient to be yelling and screaming in her sleep the other night.  This concerns me for possible REM sleep behavior disorder.  Could simply be related to antidepressant therapy-but given recent parkinsonism response to neuroleptic, symptoms should continue to be monitored closely.  Could again consider sleep medicine referral at some point in the future if needed.  Patient also continues to have headaches.  If patient remains stable would like to trial decreased dose of Effexor-XR to see if this helps improve headaches.  No acute safety concerns.  No SI.  No problematic drug or alcohol use.    5/11/2023:  Patient overall doing well.  Was able to replace Ambien with trazodone and still sleeping well.  Still some headaches but greatly improved off Ambien.  Went back to taking propranolol 20 mg twice daily and finds this most effective for her anxiety, tremor, etc.  Discussed we could consider lowering venlafaxine ER in a couple of months if still doing really well and still having some headaches.  Patient has a neurology appointment tomorrow for check-in.  No acute safety concerns.  No SI.  No problematic drug or alcohol use.    7/11/2023:  Patient overall doing well still.  Mood has remained  stable.  Anxiety overall manageable and stable as well.  Unfortunately continues to have headaches and feel fatigued.  Discussed I would like to move ahead with a trial of decreased venlafaxine.  Patient agreeable and will monitor mental health symptoms.  No acute safety concerns.  No SI.  No problematic drug or alcohol use.    10/10/2023:  Patient overall doing okay.  Unfortunately headaches and fatigue have persisted even with decreased dose of venlafaxine.  We will increase propranolol and changed to extended release so it is once a day to see if that is a little bit more helpful for anxiety and headaches.  Patient controlled decreased dose of venlafaxine and we may consider changing to something different after the holidays.  We would like to prevent any decompensation during the holiday season.  No acute safety concerns.  No SI.  No problematic drug or alcohol use.  Patient continues in therapy.    1/9/2024:  Patient overall doing relatively well.  Still getting headaches from venlafaxine so we will decrease the dose further to 75 mg daily to see if helpful without worsening mental health symptoms.  No acute safety concerns.  No SI.  No problematic drug or alcohol use.    2/28/2024:  Patient doing relatively well.  Still some anxiety but manageable.  Anxiety does not currently interfere with patient's ability to function or go about daily activities.  Easily able to distract self.  No major mood symptoms at this time.  Side effects from venlafaxine have drastically improved on lower dose.  If mental health symptoms/anxiety were to worsen in the future, would strongly consider transition to sertraline due to negative side effects on higher doses of venlafaxine.  No acute safety concerns.  No SI.  No problematic drug or alcohol use.    6/26/2024:  Patient overall doing very well.  Mood has been stable.  Anxiety manageable.  Does use Tylenol twice a day every day for headaches.  Discussed possibility of some  rebound headaches.  Resources sent to patient via [facility].  We will continue to monitor dizziness.  Blood pressures can be a little low at times.  We discussed trazodone can cause dizziness and orthostatic hypotension (blood pressure drops upon standing).  Patient wonders if it is more related to her lisinopril.  Patient encouraged to document blood pressures and present to primary care provider for ongoing evaluation.  No acute safety concerns.  No SI.  No problematic drug or alcohol use.  No medication changes today.    9/26/2024:  Patient overall doing well.  Relatively stable.  Managing anxiety symptoms well.  Sleeping well without nightly trazodone.  Discussed patient could consider massage therapy or acupuncture to see if it improves headaches.  Patient prefers not to change Effexor at this time.  Discussed patient could also talk with primary care provider about possible neurology referral for chronic headaches.  No acute safety concerns.  No SI.  No problematic drug or alcohol use.  No med changes today.    11/20/2024:  Pt doing ok - still some ongoing anxiety and headaches - possibly from Effexor. Pt will consider transitioning off Effexor-XR at next appt 1/2 (after the holidays). Could start fluoxetine 10 mg daily for 5 days (with effexor-xr 75) then decrease Effexor-XR to 37.5 mg (and increase fluoxetine to 20 mg daily) for 7 days then stop Effexor-xr and either continue of fluoxetine or trial a new medication (duloxetine?). No acute safety concerns. No SI. No problematic drug or alcohol use.      1/2/2025:  Patient unfortunately with some worsening symptoms.  Worsening chronic headaches.  Worsening tremor.  Ongoing balance struggles that are perhaps worse.  Struggling more with memory.  Anxiety greatly increased.  Patient agreeable to transitioning off venlafaxine and onto sertraline/Zoloft.  Patient also encouraged to follow up with neurology.  CT scan of head/brain ordered without contrast due to  ongoing/worsening headaches and cognitive concerns and balance issues.  Last CT scan of head completed in 2017.  No acute safety concerns.  No SI.  No problematic drug or alcohol use.    1/21/2025:  Overall doing quite well with transition to sertraline.  Patient will continue with the cross taper-titration.  It does seem she is possibly having some improvement since starting sertraline already.  Patient and daughter will continue to monitor for side effects.  No acute safety concerns.  No suicidality.  No problematic drug or alcohol use.  Started with new psychotherapist and feels it is a good connection.    2/21/2025:  Patient overall with ongoing anxiety.  We will give sertraline 100 mg daily more time to see if mild side effects resolve and to see if any additional symptoms improve.  Patient will be seen back after her upcoming neurology appointment that is scheduled for early April. Pt's CT scan of brain W/O contrast is scheduled for March.  Patient may consider trying to taper off trazodone to see if that helps improve her afternoons.  We talked about distractions and continuing to try to stay busy in the afternoon.  Historically this has been helpful.  No acute safety concerns.  No SI.  No problematic drug or alcohol use.  Continues in psychotherapy.    4/11/2025:  Patient did complete head CT scan without contrast after last follow-up appointment.  Possible ventricular enlargement noted by radiology report.  Patient saw neurology about a week ago.  No major concerns for normal pressure hydrocephalus from neurologist at this time.  Neurologist still trying to get images of CT for review but did read the radiologist report.  Neurologist did not see signs on physical exam for PD or NPH.  Feels more recent cognitive concerns likely related to anxiety and depression symptoms.  Patient declining neuropsychological testing at this time.  Discussed consideration for IOP 55+ program.  Patient prefers to wait at this  time before enrolling in [facility].  Patient is agreeable to increasing sertraline to 125 mg daily.  Will consider continued titrations as indicated and as tolerated.  Encouraged to continue in individual psychotherapy.  No acute safety concerns.  No SI.  No problematic drug or alcohol use.    5/13/2025:  Patient may be with very slight improvements in anxiety related physical symptoms after increasing sertraline dose to 125 mg daily.  Some loose stools but they have improved quite a bit.  Patient will continue trazodone as needed but has not really desire to use it much lately since sleeping okay.  Headaches and fatigue continue.  Vital signs during a clinic visit today within normal limits.  Patient has lost about 15 pounds over the past few months. Was 149 lbs at visit 2/5/2024; 149 lbs at visit 4/25/24; 149 at 8/14/24; 140 lbs at visit 12/19/24; 135 lbs at a visit 2/6/25 and 4/15/2025 was 125 lbs. Was stable at about 150 lbs up until fall 2024 and has since lost 25 pounds in about 7-8 months without having a goal of losing weight.  We will recheck sodium level since SSRI medication can reduce sodium-however, patient with chronic headaches before starting sertraline and with normal sodium in January.  No change in symptoms off trazodone for the past several weeks.  I do continue to have concerns about normal pressure hydrocephalus.  Patient's current neurologist denied concerns at recent visit.  Patient will continue to consider recommendation for repeat neuropsychological testing.  I do have ongoing concerns for cognitive changes.  Have previously discussed recommendation for 55+ IOP.  Patient and family considering trying to get second opinion at [facility].  Patient does follow-up with primary care provider in a few weeks.  I will update primary care provider with ongoing concerns from today's visit.  No acute safety concerns.  No acute suicidality.  No problematic drug or alcohol use.  Continues in psychotherapy.  If  neurologically there continue to be no concerns regarding any underlying neurological processes, could consider low-dose olanzapine to trial at bedtime for sleep and to augment sertraline for severe anxiety and to help increase appetite/reduce weight loss.  Would need to monitor very closely for any parkinsonism symptoms.    6/26/2025:  Patient overall continuing to struggle with some anxiety symptoms.  Possible mood symptoms as well.  Had second opinion from psychiatrist in the [facility] system.  Still waiting on records.  Patient and  given direct fax number to the [facility] health and addiction clinic.  Psychiatrist [facility] recommended trial with Wellbutrin.  We will start with once daily sustained release formulation of Wellbutrin at 100 mg in the morning.  Discussed watching for worsening anxiety, irritability, insomnia.  Also recommend watching for worsening headaches and any dizziness/brain fog.  Sertraline will be continued at 125 mg daily since 150 mg daily caused notable brain fog.  Brain fog resolved when patient reduced back to 125 mg daily.  No acute safety concerns.  No acute suicidality.  No problematic drug or alcohol use.    7/28/2025:  Wellbutrin  mg daily has overall been helpful.  I do suspect depression symptoms may have been more prominent than anxiety symptoms, thus Wellbutrin relieving overall mental health.  Need to monitor dizziness closely.  Patient hesitant to make changes today.  Discussed trying to skip a dose when needing to drive to see if this improves dizziness, as long as mental health symptoms do not worsen significantly.  Patient and family will reach out if concerning dizziness persists or if there are any falls.  We could trial reduced dose of Wellbutrin by having patient take 37.5 mg 2-3 times daily of the immediate release formulation.  Also encouraged patient to drink more water and stay hydrated.  Patient also working on increasing caloric intake.  No acute  safety concerns.  No acute suicidality.  No problematic drug or alcohol use.  Encouraged to continue in psychotherapy.    Medication side effects and alternatives were reviewed. Health promotion activities recommended and reviewed today. All questions addressed. Education and counseling completed regarding risks and benefits of medications and psychotherapy options. Recommend therapy for additional support.     Treatment Plan:   Continue trazodone 12.5-50 mg at bedtime as needed for sleep.    Continue propranolol EXTENDED RELEASE 60 mg ONCE daily for anxiety, tremor, headaches.  Continue sertraline/Zoloft 125 mg daily for anxiety and mood.   Continue Wellbutrin  mg daily in the morning for mood.    Discussed trying to skip a day/dose to see if dizziness improves if needing to drive (as long as no worsening mental health symptoms)  Continue to monitor dizziness and caution for falls  Increase water intake to  oz of water to see if helpful for dizziness/headaches  Reach out if ongoing concerns for dizziness.   Follow-up as needed with your neurologist, Dr. Amato. Can call [phone] to get scheduled or with neurological concerns.   [facility] fax number: [phone].  Continue all other cares per primary care provider.   Continue all other medications as reviewed per electronic medical record today.   Safety plan reviewed. To the Emergency Department as needed or call after hours crisis line at [phone] or [phone]. [place] Crisis Text Line. Text [place] to 917571 or Suicide LifeLine Chat: suicidepreventionlifeline.org/chat  Continue individual psychotherapy for additional support and ongoing development of nonpharmacologic coping skills and strategies.  Schedule an appointment with me in 6-8 weeks or sooner as needed. Call [facility] at [phone] to schedule.  Follow up with primary care provider as planned or for acute medical concerns.  Call the psychiatric  nurse line with medication questions or concerns at [phone].  MyChart may be used to communicate with your provider, but this is not intended to be used for emergencies.    Administrative Billing:   Phone Call/Video Duration: 18 Minutes  Start: 10:31a  Stop: 10:49a    The longitudinal plan of care for the diagnosis(es)/condition(s) as documented were addressed during this visit. Due to the added complexity in care, I will continue to support [place] in the subsequent management and with ongoing continuity of care.    Patient Status:  Continuous care patient.     Signed:   Judy Pompa DO  [facility]    Disclaimer: This note consists of symbols derived from keyboarding, dictation and/or voice recognition software. As a result, there may be errors in the script that have gone undetected. Please consider this when interpreting information found in this chart.

## 2025-07-28 NOTE — PROGRESS NOTES
"Virtual Visit Details    Type of service:  Video Visit   Video Start Time: {video visit start/end time for provider to select:417192}  Video End Time:{video visit start/end time for provider to select:201304}    Originating Location (pt. Location): {video visit patient location:124229::\"Home\"}  {PROVIDER LOCATION On-site should be selected for visits conducted from your clinic location or adjoining [facility] hospital, academic office, or other nearby [facility] building. Off-site should be selected for all other provider locations, including home:364816}  Distant Location (provider location):  {virtual location provider:717735}  Platform used for Video Visit: {Virtual Visit Platforms:513093::\"[employer]\"}          Answers submitted by the patient for this visit:  Patient Health Questionnaire (G7) (Submitted on 7/25/2025)  TIFFANY 7 TOTAL SCORE: 4    "

## 2025-07-28 NOTE — NURSING NOTE
Is the patient currently in the state of [place]? YES    Current patient location: [address]  [facility] [address]    Visit mode:Video    If the visit is dropped, the patient can be reconnected by: VIDEO VISIT: Text to cell phone:   Telephone Information:   Mobile [phone]       Will anyone else be joining the visit? No  (If patient encounters technical issues they should call [phone])    Are changes needed to the allergy or medication list? Pt stated no changes to allergies and Pt stated no med changes    Are refills needed on medications prescribed by this physician? Discuss with Provider    Rooming Documentation: Questionnaire(s) completed.    Reason for visit: SHANNAN Segura, ANETTEF

## 2025-07-28 NOTE — PATIENT INSTRUCTIONS
Treatment Plan:   Continue trazodone 12.5-50 mg at bedtime as needed for sleep.    Continue propranolol EXTENDED RELEASE 60 mg ONCE daily for anxiety, tremor, headaches.  Continue sertraline/Zoloft 125 mg daily for anxiety and mood.   Continue Wellbutrin  mg daily in the morning for mood.    Discussed trying to skip a day/dose to see if dizziness improves if needing to drive (as long as no worsening mental health symptoms)  Continue to monitor dizziness and caution for falls  Increase water intake to  oz of water to see if helpful for dizziness/headaches  Reach out if ongoing concerns for dizziness.   Follow-up as needed with your neurologist, Dr. Amato. Can call [phone] to get scheduled or with neurological concerns.   [facility] fax number: [phone].  Continue all other cares per primary care provider.   Continue all other medications as reviewed per electronic medical record today.   Safety plan reviewed. To the Emergency Department as needed or call after hours crisis line at [phone] or [phone]. [place] Crisis Text Line. Text [place] to 814741 or Suicide LifeLine Chat: suicidepreventionlifeline.org/chat  Continue individual psychotherapy for additional support and ongoing development of nonpharmacologic coping skills and strategies.  Schedule an appointment with me in 6-8 weeks or sooner as needed. Call [facility] at [phone] to schedule.  Follow up with primary care provider as planned or for acute medical concerns.  Call the psychiatric nurse line with medication questions or concerns at [phone].  MyChart may be used to communicate with your provider, but this is not intended to be used for emergencies.    Patient Education   Collaborative Care Psychiatry Service  What to Expect  Here's what to expect from your Collaborative Care Psychiatry Service (CCPS).   About CCPS  CCPS means 2 people work together to help you get better. You'll meet  "with a behavioral health clinician and a psychiatric doctor. A behavioral health clinician helps people with mental health problems by talking with them. A psychiatric doctor helps people by giving them medicine.  How it works  At every visit, you'll see the behavioral health clinician (BHC) first. They'll talk with you about how you're doing and teach you how to feel better.   Then you'll see the psychiatric doctor. This doctor can help you deal with troubling thoughts and feelings by giving you medicine. They'll make sure you know the plan for your care.   CCPS usually takes 3 to 6 visits. If you need more visits, we may have you start seeing a different psychiatric doctor for ongoing care.  If you have any questions or concerns, we'll be glad to talk with you.  About visits  Be open  At your visits, please talk openly about your problems. It may feel hard, but it's the best way for us to help you.  Cancelling visits  If you can't come to your visit, please call us right away at [phone]. If you don't cancel at least 24 hours (1 full day) before your visit, that's \"late cancellation.\"  Being late to visits  Being very late is the same as not showing up. You will be a \"no show\" if:  Your appointment starts with a BHC, and you're more than 15 minutes late for a 30-minute (half hour) visit. This will also cancel your appointment with the psychiatric doctor.  Your appointment is with a psychiatric doctor only, and you're more than 15 minutes late for a 30-minute (half hour) visit.  Your appointment is with a psychiatric doctor only, and you're more than 30 minutes late for a 60-minute (full hour) visit.  If you cancel late or don't show up 2 times within 6 months, we may end your care.   Getting help between visits  If you need help between visits, you can call us Monday to Friday from 8 a.m. to 4:30 p.m. at [phone].  Emergency care  Call 911 or go to the nearest emergency department if your life or " someone else's life is in danger.  Call 988 anytime to reach the national Suicide and Crisis hotline.  Medicine refills  To refill your medicine, call your pharmacy. You can also call [facility] at [phone], Monday to Friday, 8 a.m. to 4:30 p.m. It can take 1 to 3 business days to get a refill.   Forms, letters, and tests  You may have papers to fill out, like FMLA, short-term disability, and workability. We can help you with these forms at your visits, but you must have an appointment. You may need more than 1 visit for this, to be in an intensive therapy program, or both.  Before we can give you medicine for ADHD, we may refer you to get tested for it or confirm it another way.  We may not be able to give you an emotional support animal letter.  We don't do mental health checks ordered by the court.   We don't do mental health testing, but we can refer you to get tested.   Thank you for choosing us for your care.  For informational purposes only. Not to replace the advice of your health care provider. Copyright   2022 [facility]. All rights reserved. [employer] [id number] - Rev 11/24.

## 2025-07-31 ENCOUNTER — VIRTUAL VISIT (OUTPATIENT)
Facility: CLINIC | Age: 70
End: 2025-07-31
Payer: COMMERCIAL

## 2025-07-31 DIAGNOSIS — F41.1 GENERALIZED ANXIETY DISORDER: Primary | ICD-10-CM

## 2025-07-31 NOTE — PROGRESS NOTES
[facility]                                     Progress Note    Patient Name: Jolynn Haji  Date: 7/31/2025         Service Type: Individual      Session Start Time: 10:00 am  Session End Time: 10:53 am     Session Length: 53 min    Session #: 12  (previous therapy with Erika RAIN)    Attendees: Client attended alone    Service Modality:  Video Visit:      Provider verified identity through the following two step process.  Patient provided:  Patient address    Telemedicine Visit: The patient's condition can be safely assessed and treated via synchronous audio and visual telemedicine encounter.      Reason for Telemedicine Visit: Patient convenience (e.g. access to timely appointments / distance to available provider)    Originating Site (Patient Location): Patient's home    Distant Site (Provider Location): Provider Remote Setting- Home Office    Consent:  The patient/guardian has verbally consented to: the potential risks and benefits of telemedicine (video visit) versus in person care; bill my insurance or make self-payment for services provided; and responsibility for payment of non-covered services.     Patient would like the video invitation sent by:  My Chart    Mode of Communication:  Video Conference via [facility]    Distant Location (Provider):  Off-site    As the provider I attest to compliance with applicable laws and regulations related to telemedicine.    DATA  Interactive Complexity: No  Crisis: No      Progress Since Last Session (Related to Symptoms / Goals / Homework):   Symptoms: Improving anxiety and energy    Homework: Achieved / completed to satisfaction      Episode of Care Goals: Satisfactory progress - ACTION (Actively working towards change); Intervened by reinforcing change plan / affirming steps taken     Current / Ongoing Stressors and Concerns:  Meds increase/change, improvements in functioning, irritability reduction. Excited about upcoming wedding     Treatment  Objective(s) Addressed in This Session:   Sleep hygiene  Grounding skills, mindfulness  Gratitude practice  Anxiety triggers       Intervention:  Interpersonal Therapy: provided active listening and validation of experience with stressors. Utilized rapport building, reinforced grounding skills, sleep hygiene strategies, GLAD gratitude practice. Reviewed checking the facts, visualized an upcoming experience, celebrated gains   The following assessments were completed by patient for this visit:  PHQ2:       5/13/2025     2:18 PM 5/3/2023     1:18 PM 3/21/2023    11:20 AM 2/23/2023     7:15 PM 1/31/2023     3:21 PM 1/12/2023     4:28 PM 12/15/2022     9:12 PM   PHQ-2 ( 1999 [employer])   Q1: Little interest or pleasure in doing things 1   0 0 0    Q2: Feeling down, depressed or hopeless 1   0 0 0    PHQ-2 Score 2   0 0 0    Q1: Little interest or pleasure in doing things    Not at all Not at all Not at all    Q2: Feeling down, depressed or hopeless    Not at all Not at all Not at all    PHQ-2 Score  Incomplete Incomplete 0 0 0 Incomplete     PHQ9:       6/3/2025     9:54 AM 6/8/2025     4:56 PM 6/25/2025     8:23 AM 7/10/2025    12:44 PM 7/28/2025     9:37 AM 7/28/2025     9:48 AM 7/28/2025     9:49 AM   PHQ-9 SCORE   PHQ-9 Total Score MyChart 5 (Mild depression) 5 (Mild depression) 7 (Mild depression) 8 (Mild depression) 1 (Minimal depression)     PHQ-9 Total Score 5  5  7  8  1  1 1       Patient-reported     GAD2:       3/2/2025    12:35 PM 4/6/2025     1:06 PM 5/8/2025     4:40 PM 6/8/2025     4:57 PM 6/24/2025     9:15 PM 7/10/2025    12:46 PM 7/25/2025     4:16 PM   TIFFANY-2   Feeling nervous, anxious, or on edge 3 3 3 2 3 3 3   Not being able to stop or control worrying 1 1 0 0 0 1 0   TIFFANY-2 Total Score 4  4  3  2  3  4  3        Patient-reported     GAD7:       4/6/2025     1:06 PM 5/8/2025     4:40 PM 6/1/2025     3:44 PM 6/3/2025     9:57 AM 6/24/2025     9:15 PM 7/10/2025    12:46 PM 7/25/2025     4:16 PM   TIFFANY-7  SCORE   Total Score 6 (mild anxiety) 3 (minimal anxiety) 2 (minimal anxiety) 4 (minimal anxiety) 7 (mild anxiety) 6 (mild anxiety) 4 (minimal anxiety)   Total Score 6  3  2  4  7  6  4        Patient-reported     CAGE-AID:       6/6/2017    12:05 PM 5/12/2022     2:26 PM   CAGE-AID Total Score   Total Score  0    0   Total Score MyChart  0 (A total score of 2 or greater is considered clinically significant)       Information is confidential and restricted. Go to Review Flowsheets to unlock data.     PROMIS 10-Global Health (only subscores and total score):       9/23/2024     9:33 AM 10/8/2024     4:11 PM 12/28/2024     3:39 PM 4/2/2025     1:09 PM 4/6/2025     1:17 PM 7/7/2025     4:30 PM 7/26/2025     3:42 PM   PROMIS-10 Scores Only   Global Mental Health Score 17    17    17 18 12  11  13  13  14   Global Physical Health Score 18    18    18 17 12  15  14  15  15   PROMIS TOTAL - SUBSCORES 35    35    35 35 24  26  27  28  29       Patient-reported     [place] Suicide Severity Rating Scale (Lifetime/Recent)      5/12/2022    10:07 PM 9/27/2022     2:16 PM 2/28/2024    10:11 AM   [place] Suicide Severity Rating (Lifetime/Recent)   1. Wish to be Dead (Lifetime) N Y Y   Wish to be Dead Description (Lifetime)  fleeting thought of wishing she were dead    1. Wish to be Dead (Past 1 Month)  N N   2. Non-Specific Active Suicidal Thoughts (Lifetime) N N N   Most Severe Ideation Rating (Lifetime)  1 1   Most Severe Ideation Rating (Past 1 Month)  1 1   Frequency (Lifetime)  1 1   Frequency (Past 1 Month)  1    Duration (Lifetime)  1 1   Duration (Past 1 Month)  1    Controllability (Lifetime)  1    Controllability (Past 1 Month)  1    Deterrents (Lifetime)  1    Deterrents (Past 1 Month)  1    Reasons for Ideation (Lifetime)  4 5   Reasons for Ideation (Past 1 Month)  4 0   Actual Attempt (Lifetime) N N N   Has subject engaged in non-suicidal self-injurious behavior? (Lifetime) N N N   Interrupted Attempts (Lifetime) N  N N   Aborted or Self-Interrupted Attempt (Lifetime) N N N   Preparatory Acts or Behavior (Lifetime) N N N   Calculated C-SSRS Risk Score (Lifetime/Recent) No Risk Indicated No Risk Indicated No Risk Indicated         ASSESSMENT: Current Emotional / Mental Status (status of significant symptoms):   Risk status (Self / Other harm or suicidal ideation)   Patient denies current fears or concerns for personal safety.   Patient denies current or recent suicidal ideation or behaviors. Noted that patient made errors in the safety assessment.    Patient denies current or recent homicidal ideation or behaviors.   Patient denies current or recent self injurious behavior or ideation.   Patient denies other safety concerns.   Patient reports there has been no change in risk factors since their last session.     Patient reports there has been no change in protective factors since their last session.     Recommended that patient call 911 or go to the local ED should there be a change in any of these risk factors     Appearance:   Appropriate    Eye Contact:   Good    Psychomotor Behavior: Normal    Attitude:   Cooperative    Orientation:   All   Speech    Rate / Production: Normal     Volume:  Normal    Mood:    Anxious  Normal Expansive   Affect:    Appropriate    Thought Content:  Clear    Thought Form:  Coherent  Logical  Circumstantial   Insight:    Fair      Medication Review:   Changes to psychiatric medications, see updated Medication List in EPIC.  move off Venlafaxine complete     Medication Compliance:   Yes     Changes in Health Issues:   None reported     Chemical Use Review:   Substance Use: Chemical use reviewed, no active concerns identified      Tobacco Use: No current tobacco use.      Diagnosis:  1. Generalized anxiety disorder      Collateral Reports Completed:   Not Applicable    PLAN: (Patient Tasks / Therapist Tasks / Other)  Continue  mindfulness grounding practices, GLAD gratitude, sleep hygiene, notice  anxiety triggers, leaves on a stream, little or big deal. Check the facts, radical acceptance, use mindfulness, body scan, am meditation, panic skills    DINESH Campbell  7/31/25                                                            Individual Treatment Plan    Patient's Name: Jolynn Haji  YOB: 1955    Date of Creation: 3/3/25  Date Treatment Plan Last Reviewed/Revised: 6/12/25    DSM5 Diagnoses: 300.02 (F41.1) Generalized Anxiety Disorder  Psychosocial / Contextual Factors: aging, panic  PROMIS (reviewed every 90 days): see above    Referral / Collaboration:  Referral to another professional/service is not indicated at this time..    Anticipated number of session for this episode of care: tbd  Anticipation frequency of session: Every other week  Anticipated Duration of each session: 38-52 minutes  Treatment plan will be reviewed in 90 days or when goals have been changed.       MeasurableTreatment Goal(s) related to diagnosis / functional impairment(s)  Goal 1: Patient will learn and practice skills to address anxiety    I will know I've met my goal when tbc.      Objective #A (Patient Action)    Patient will :.  Sleep hygiene  Grounding skills, mindfulness  Gratitude practice  Breathing skills  Anxiety triggers  Panic psychoed    Status: Continued - Date(s):    6/9/25    Intervention(s)  Therapist will teach sleep hygiene, grounding skills, mindfulness, gratitude practices and assign homework.      Patient has reviewed and agreed to the above plan.      DINESH Campbell  June 9, 2025                                           ______________________________________________________________________

## 2025-08-06 ENCOUNTER — OFFICE VISIT (OUTPATIENT)
Dept: FAMILY MEDICINE | Facility: CLINIC | Age: 70
End: 2025-08-06
Payer: COMMERCIAL

## 2025-08-06 VITALS
TEMPERATURE: 97.8 F | BODY MASS INDEX: 18.25 KG/M2 | RESPIRATION RATE: 16 BRPM | OXYGEN SATURATION: 98 % | WEIGHT: 120.4 LBS | SYSTOLIC BLOOD PRESSURE: 114 MMHG | DIASTOLIC BLOOD PRESSURE: 70 MMHG | HEIGHT: 68 IN | HEART RATE: 73 BPM

## 2025-08-06 DIAGNOSIS — G31.9 VENTRICULAR ENLARGEMENT DUE TO BRAIN ATROPHY: ICD-10-CM

## 2025-08-06 DIAGNOSIS — R42 DIZZINESS: ICD-10-CM

## 2025-08-06 DIAGNOSIS — H81.10 BENIGN PAROXYSMAL POSITIONAL VERTIGO, UNSPECIFIED LATERALITY: ICD-10-CM

## 2025-08-06 DIAGNOSIS — R93.0 ABNORMAL CT OF THE HEAD: ICD-10-CM

## 2025-08-06 DIAGNOSIS — F41.1 GAD (GENERALIZED ANXIETY DISORDER): ICD-10-CM

## 2025-08-06 DIAGNOSIS — F09 MILD COGNITIVE DISORDER: ICD-10-CM

## 2025-08-06 DIAGNOSIS — L71.9 ROSACEA: ICD-10-CM

## 2025-08-06 DIAGNOSIS — F39 MOOD DISORDER: ICD-10-CM

## 2025-08-06 DIAGNOSIS — I10 ESSENTIAL HYPERTENSION WITH GOAL BLOOD PRESSURE LESS THAN 140/90: ICD-10-CM

## 2025-08-06 DIAGNOSIS — G21.19 DRUG-INDUCED PARKINSONISM: ICD-10-CM

## 2025-08-06 DIAGNOSIS — F33.0 MAJOR DEPRESSIVE DISORDER, RECURRENT, MILD: Primary | ICD-10-CM

## 2025-08-06 DIAGNOSIS — M17.12 PRIMARY LOCALIZED OSTEOARTHROSIS OF LEFT LOWER LEG: ICD-10-CM

## 2025-08-06 RX ORDER — CEFUROXIME AXETIL 500 MG/1
500 TABLET ORAL 2 TIMES DAILY
Qty: 6 TABLET | Refills: 0 | Status: SHIPPED | OUTPATIENT
Start: 2025-08-06 | End: 2025-08-09

## 2025-08-06 RX ORDER — TRIAMCINOLONE ACETONIDE 40 MG/ML
40 INJECTION, SUSPENSION INTRA-ARTICULAR; INTRAMUSCULAR ONCE
Status: COMPLETED | OUTPATIENT
Start: 2025-08-06 | End: 2025-08-06

## 2025-08-06 RX ADMIN — TRIAMCINOLONE ACETONIDE 40 MG: 40 INJECTION, SUSPENSION INTRA-ARTICULAR; INTRAMUSCULAR at 16:04

## 2025-08-11 ENCOUNTER — PATIENT OUTREACH (OUTPATIENT)
Dept: CARE COORDINATION | Facility: CLINIC | Age: 70
End: 2025-08-11
Payer: COMMERCIAL

## 2025-08-12 ENCOUNTER — VIRTUAL VISIT (OUTPATIENT)
Facility: CLINIC | Age: 70
End: 2025-08-12
Payer: COMMERCIAL

## 2025-08-12 DIAGNOSIS — F41.1 GENERALIZED ANXIETY DISORDER: Primary | ICD-10-CM

## 2025-08-12 PROCEDURE — 90837 PSYTX W PT 60 MINUTES: CPT | Mod: 95

## 2025-08-13 ENCOUNTER — THERAPY VISIT (OUTPATIENT)
Dept: PHYSICAL THERAPY | Facility: CLINIC | Age: 70
End: 2025-08-13
Attending: FAMILY MEDICINE
Payer: COMMERCIAL

## 2025-08-13 DIAGNOSIS — H81.10 BPPV (BENIGN PAROXYSMAL POSITIONAL VERTIGO): Primary | ICD-10-CM

## 2025-08-13 DIAGNOSIS — R42 DIZZINESS: ICD-10-CM

## 2025-08-13 DIAGNOSIS — H81.10 BENIGN PAROXYSMAL POSITIONAL VERTIGO, UNSPECIFIED LATERALITY: ICD-10-CM

## 2025-08-13 PROCEDURE — 97162 PT EVAL MOD COMPLEX 30 MIN: CPT | Mod: GP | Performed by: PHYSICAL THERAPIST

## 2025-08-13 PROCEDURE — 95992 CANALITH REPOSITIONING PROC: CPT | Mod: GP | Performed by: PHYSICAL THERAPIST

## 2025-08-19 ENCOUNTER — THERAPY VISIT (OUTPATIENT)
Dept: PHYSICAL THERAPY | Facility: CLINIC | Age: 70
End: 2025-08-19
Attending: FAMILY MEDICINE
Payer: COMMERCIAL

## 2025-08-19 DIAGNOSIS — R42 DIZZINESS: ICD-10-CM

## 2025-08-19 DIAGNOSIS — H81.10 BENIGN PAROXYSMAL POSITIONAL VERTIGO, UNSPECIFIED LATERALITY: Primary | ICD-10-CM

## 2025-08-19 PROCEDURE — 95992 CANALITH REPOSITIONING PROC: CPT | Mod: GP | Performed by: PHYSICAL THERAPIST

## 2025-08-21 ENCOUNTER — OFFICE VISIT (OUTPATIENT)
Dept: DERMATOLOGY | Facility: CLINIC | Age: 70
End: 2025-08-21
Payer: COMMERCIAL

## 2025-08-21 DIAGNOSIS — Z85.828 HISTORY OF SKIN CANCER: Primary | ICD-10-CM

## 2025-09-02 ENCOUNTER — THERAPY VISIT (OUTPATIENT)
Dept: PHYSICAL THERAPY | Facility: CLINIC | Age: 70
End: 2025-09-02
Attending: FAMILY MEDICINE
Payer: COMMERCIAL

## 2025-09-02 ENCOUNTER — NURSE TRIAGE (OUTPATIENT)
Dept: FAMILY MEDICINE | Facility: CLINIC | Age: 70
End: 2025-09-02

## 2025-09-02 DIAGNOSIS — R42 DIZZINESS: Primary | ICD-10-CM

## 2025-09-02 PROCEDURE — 97110 THERAPEUTIC EXERCISES: CPT | Mod: GP | Performed by: PHYSICAL THERAPIST

## 2025-09-03 ENCOUNTER — HOSPITAL ENCOUNTER (EMERGENCY)
Facility: CLINIC | Age: 70
Discharge: HOME | End: 2025-09-03
Attending: PHYSICIAN ASSISTANT | Admitting: PHYSICIAN ASSISTANT
Payer: COMMERCIAL

## 2025-09-03 VITALS
SYSTOLIC BLOOD PRESSURE: 123 MMHG | OXYGEN SATURATION: 98 % | DIASTOLIC BLOOD PRESSURE: 79 MMHG | TEMPERATURE: 98.4 F | HEART RATE: 61 BPM | RESPIRATION RATE: 16 BRPM

## 2025-09-03 DIAGNOSIS — G89.29 CHRONIC CHEST PAIN: ICD-10-CM

## 2025-09-03 DIAGNOSIS — R42 LIGHTHEADEDNESS: Primary | ICD-10-CM

## 2025-09-03 DIAGNOSIS — I95.1 ORTHOSTATIC HYPOTENSION: ICD-10-CM

## 2025-09-03 DIAGNOSIS — R07.9 CHRONIC CHEST PAIN: ICD-10-CM

## 2025-09-03 LAB
ANION GAP SERPL CALCULATED.3IONS-SCNC: 11 MMOL/L (ref 7–15)
ATRIAL RATE - MUSE: 64 BPM
BASOPHILS # BLD AUTO: 0.04 10E3/UL (ref 0–0.2)
BASOPHILS NFR BLD AUTO: 0.7 %
BUN SERPL-MCNC: 18.8 MG/DL (ref 8–23)
CALCIUM SERPL-MCNC: 9.4 MG/DL (ref 8.8–10.4)
CHLORIDE SERPL-SCNC: 103 MMOL/L (ref 98–107)
CREAT SERPL-MCNC: 0.84 MG/DL (ref 0.51–0.95)
DIASTOLIC BLOOD PRESSURE - MUSE: NORMAL MMHG
EGFRCR SERPLBLD CKD-EPI 2021: 74 ML/MIN/1.73M2
EOSINOPHIL # BLD AUTO: 0.17 10E3/UL (ref 0–0.7)
EOSINOPHIL NFR BLD AUTO: 2.8 %
ERYTHROCYTE [DISTWIDTH] IN BLOOD BY AUTOMATED COUNT: 13.9 % (ref 10–15)
GLUCOSE SERPL-MCNC: 85 MG/DL (ref 70–99)
HCO3 SERPL-SCNC: 26 MMOL/L (ref 22–29)
HCT VFR BLD AUTO: 39.1 % (ref 35–47)
HGB BLD-MCNC: 12.6 G/DL (ref 11.7–15.7)
HOLD SPECIMEN: NORMAL
HOLD SPECIMEN: NORMAL
IMM GRANULOCYTES # BLD: <0.03 10E3/UL
IMM GRANULOCYTES NFR BLD: 0.3 %
INTERPRETATION ECG - MUSE: NORMAL
LYMPHOCYTES # BLD AUTO: 1.39 10E3/UL (ref 0.8–5.3)
LYMPHOCYTES NFR BLD AUTO: 23.1 %
MCH RBC QN AUTO: 28.6 PG (ref 26.5–33)
MCHC RBC AUTO-ENTMCNC: 32.2 G/DL (ref 31.5–36.5)
MCV RBC AUTO: 88.9 FL (ref 78–100)
MONOCYTES # BLD AUTO: 0.59 10E3/UL (ref 0–1.3)
MONOCYTES NFR BLD AUTO: 9.8 %
NEUTROPHILS # BLD AUTO: 3.8 10E3/UL (ref 1.6–8.3)
NEUTROPHILS NFR BLD AUTO: 63.3 %
NRBC # BLD AUTO: <0.03 10E3/UL
NRBC BLD AUTO-RTO: 0 /100
NT-PROBNP SERPL-MCNC: 578 PG/ML (ref 0–353)
P AXIS - MUSE: 47 DEGREES
PLATELET # BLD AUTO: 202 10E3/UL (ref 150–450)
POTASSIUM SERPL-SCNC: 4.4 MMOL/L (ref 3.4–5.3)
PR INTERVAL - MUSE: 128 MS
QRS DURATION - MUSE: 108 MS
QT - MUSE: 430 MS
QTC - MUSE: 443 MS
R AXIS - MUSE: 15 DEGREES
RBC # BLD AUTO: 4.4 10E6/UL (ref 3.8–5.2)
SODIUM SERPL-SCNC: 140 MMOL/L (ref 135–145)
SYSTOLIC BLOOD PRESSURE - MUSE: NORMAL MMHG
T AXIS - MUSE: 64 DEGREES
TROPONIN T SERPL HS-MCNC: 6 NG/L
VENTRICULAR RATE- MUSE: 64 BPM
WBC # BLD AUTO: 6.01 10E3/UL (ref 4–11)

## 2025-09-03 PROCEDURE — 36415 COLL VENOUS BLD VENIPUNCTURE: CPT | Performed by: PHYSICIAN ASSISTANT

## 2025-09-03 PROCEDURE — 84484 ASSAY OF TROPONIN QUANT: CPT | Performed by: PHYSICIAN ASSISTANT

## 2025-09-03 PROCEDURE — 93005 ELECTROCARDIOGRAM TRACING: CPT

## 2025-09-03 PROCEDURE — 85025 COMPLETE CBC W/AUTO DIFF WBC: CPT | Performed by: PHYSICIAN ASSISTANT

## 2025-09-03 PROCEDURE — 258N000003 HC RX IP 258 OP 636: Performed by: PHYSICIAN ASSISTANT

## 2025-09-03 PROCEDURE — 99285 EMERGENCY DEPT VISIT HI MDM: CPT | Mod: 25 | Performed by: PHYSICIAN ASSISTANT

## 2025-09-03 PROCEDURE — 82310 ASSAY OF CALCIUM: CPT | Performed by: PHYSICIAN ASSISTANT

## 2025-09-03 PROCEDURE — 96361 HYDRATE IV INFUSION ADD-ON: CPT

## 2025-09-03 PROCEDURE — 96360 HYDRATION IV INFUSION INIT: CPT

## 2025-09-03 PROCEDURE — 83880 ASSAY OF NATRIURETIC PEPTIDE: CPT | Performed by: PHYSICIAN ASSISTANT

## 2025-09-03 RX ADMIN — SODIUM CHLORIDE 1000 ML: 9 INJECTION, SOLUTION INTRAVENOUS at 16:31

## 2025-09-03 ASSESSMENT — COLUMBIA-SUICIDE SEVERITY RATING SCALE - C-SSRS
2. HAVE YOU ACTUALLY HAD ANY THOUGHTS OF KILLING YOURSELF IN THE PAST MONTH?: NO
6. HAVE YOU EVER DONE ANYTHING, STARTED TO DO ANYTHING, OR PREPARED TO DO ANYTHING TO END YOUR LIFE?: NO
1. IN THE PAST MONTH, HAVE YOU WISHED YOU WERE DEAD OR WISHED YOU COULD GO TO SLEEP AND NOT WAKE UP?: NO

## 2025-09-03 ASSESSMENT — ACTIVITIES OF DAILY LIVING (ADL)
ADLS_ACUITY_SCORE: 41

## 2025-09-04 ENCOUNTER — PATIENT OUTREACH (OUTPATIENT)
Dept: CARE COORDINATION | Facility: CLINIC | Age: 70
End: 2025-09-04
Payer: COMMERCIAL

## (undated) DEVICE — KIT ENDO TURNOVER/PROCEDURE W/CLEAN A SCOPE LINERS 103888

## (undated) RX ORDER — FENTANYL CITRATE 50 UG/ML
INJECTION, SOLUTION INTRAMUSCULAR; INTRAVENOUS
Status: DISPENSED
Start: 2023-08-25

## (undated) RX ORDER — ONDANSETRON 2 MG/ML
INJECTION INTRAMUSCULAR; INTRAVENOUS
Status: DISPENSED
Start: 2023-08-25